# Patient Record
Sex: MALE | Race: WHITE | NOT HISPANIC OR LATINO | Employment: OTHER | URBAN - METROPOLITAN AREA
[De-identification: names, ages, dates, MRNs, and addresses within clinical notes are randomized per-mention and may not be internally consistent; named-entity substitution may affect disease eponyms.]

---

## 2022-08-07 ENCOUNTER — NURSE TRIAGE (OUTPATIENT)
Dept: NURSING | Facility: CLINIC | Age: 67
End: 2022-08-07

## 2022-08-07 NOTE — TELEPHONE ENCOUNTER
Nurse Triage SBAR    Is this a 2nd Level Triage? No    Situation: Patient tested positive for Coronavirus today on home test antigen test. Patient has a runny nose, hoarse voice, no throat discomfort, occasional cough (sometimes productive).     Background: Patient reports his symptoms started Friday, 8/5/22.  Patient states he is vaccinated and boosted.    Assessment:     Monoclonal Antibody Administration    You may be eligible to receive a new treatment with a monoclonal antibody for preventing hospitalization in patients at high risk for complications from COVID-19. This medication is still experimental and available on a limited basis; it is given through an IV and must be given at an infusion center. Please note that not all people who are eligible will receive the medication since it is in limited supply.  Is the patient symptomatic and onset of symptoms within the last 7 days?  Yes  Is the patient interested in a visit with a provider to discuss treatment options?: Yes  Is the patient seen at [facility]?  No: Warm transfer caller to [phone] to be scheduled with a virtual urgent provider.  During transfer, instruct  on appropriate time frame for visit     Review information with Patient    Your result was positive. This means you have COVID-19 (coronavirus).      How can I protect others?    These guidelines are for isolating before returning to work, school or .       If you DO have symptoms:  o Stay home and away from others  - For at least 5 days after your symptoms started, AND   - You are fever free for 24 hours (with no medicine that reduces fever), AND  - Your other symptoms are better.  o Wear a mask for 10 full days any time you are around others.    If you DON'T have symptoms:  o Stay at home and away from others for at least 5 days after your positive test.  o Wear a mask for 10 full days any time you are around others.    There may be different guidelines for  healthcare facilities. Please check with the specific sites before arriving.     If you've been told by a doctor that you were severely ill with COVID-19 or are immunocompromised, you should isolate for at least 10 days.    You should not go back to work until you meet the guidelines above for ending your home isolation. You don't need to be retested for COVID-19 before going back to work--studies show that you won't spread the virus if it's been at least 10 days since your symptoms started (or 20 days, if you have a weak immune system).    Employers, schools, and daycares: This is an official notice for this person's medical guidelines for returning in-person. They must meet the above guidelines before going back to work, school, or  in person.    You will receive a positive COVID-19 letter via [employer] or the mail soon with additional self-care information.      Would you like me to review some of that information with you now?  Yes    How can I take care of myself?      Get lots of rest. Drink extra fluids (unless a doctor has told you not to).      Take Tylenol (acetaminophen) for fever or pain. If you have liver or kidney problems, ask your family doctor if it's okay to take Tylenol.     Take either:     650 mg (two 325 mg pills) every 4 to 6 hours, or     1,000 mg (two 500 mg pills) every 8 hours as needed.     Note: Do not take more than 3,000 mg in one day. Acetaminophen is found in many medicines (both prescribed and over-the-counter medicines). Read all labels to be sure you don't take too much.    For children, check the Tylenol bottle for the right dose (based on their age or weight).      If you have other health problems (like cancer, heart failure, an organ transplant or severe kidney disease): Call your specialty clinic if you don't feel better in the next 2 days.      Know when to call 911: Emergency warning signs include:    Trouble breathing or shortness of breath    Pain or pressure in the  chest that doesn't go away    Feeling confused like you haven't felt before, or not being able to wake up    Bluish-colored lips or face        Recommendation: Per disposition, Call telemedicine provider now. Home care advice provided. Advised patient to call back with any new or worsening symptoms. Patient verbalized understanding and agrees with plan. Patient transferred to scheduling for Covid treatment.     Protocol Recommended Disposition: eVisit Referral/Virtual Visit to discuss Covid treatment    Arleth Conley RN on 8/7/2022 at 3:01 PM    Reason for Disposition    [1] HIGH RISK for severe COVID complications (e.g., weak immune system, age > 64 years, obesity with BMI > 25, pregnant, chronic lung disease or other chronic medical condition) AND [2] COVID symptoms (e.g., cough, fever)  (Exceptions: Already seen by PCP and no new or worsening symptoms.)    Additional Information    Negative: SEVERE difficulty breathing (e.g., struggling for each breath, speaks in single words)    Negative: Difficult to awaken or acting confused (e.g., disoriented, slurred speech)    Negative: Bluish (or gray) lips or face now    Negative: Shock suspected (e.g., cold/pale/clammy skin, too weak to stand, low BP, rapid pulse)    Negative: Sounds like a life-threatening emergency to the triager    Negative: [1] Diagnosed or suspected COVID-19 AND [2] symptoms lasting 3 or more weeks    Negative: [1] COVID-19 exposure AND [2] no symptoms    Negative: COVID-19 vaccine reaction suspected (e.g., fever, headache, muscle aches) occurring 1 to 3 days after getting vaccine    Negative: COVID-19 vaccine, questions about    Negative: [1] Lives with someone known to have influenza (flu test positive) AND [2] flu-like symptoms (e.g., cough, runny nose, sore throat, SOB; with or without fever)    Negative: [1] Adult with possible COVID-19 symptoms AND [2] triager concerned about severity of symptoms or other causes    Negative: COVID-19 and  breastfeeding, questions about    Negative: SEVERE or constant chest pain or pressure  (Exception: Mild central chest pain, present only when coughing.)    Negative: MODERATE difficulty breathing (e.g., speaks in phrases, SOB even at rest, pulse 100-120)    Negative: [1] Headache AND [2] stiff neck (can't touch chin to chest)    Negative: Oxygen level (e.g., pulse oximetry) 90 percent or lower     Not available    Negative: Chest pain or pressure    Negative: Patient sounds very sick or weak to the triager    Negative: MILD difficulty breathing (e.g., minimal/no SOB at rest, SOB with walking, pulse <100)    Negative: Fever > 103 F (39.4 C)    Negative: [1] Fever > 101 F (38.3 C) AND [2] age > 60 years    Negative: [1] Fever > 100.0 F (37.8 C) AND [2] bedridden (e.g., nursing home patient, CVA, chronic illness, recovering from surgery)    Protocols used: CORONAVIRUS (COVID-19) DIAGNOSED OR [id number]

## 2022-08-08 ENCOUNTER — VIRTUAL VISIT (OUTPATIENT)
Dept: URGENT CARE | Facility: CLINIC | Age: 67
End: 2022-08-08
Payer: COMMERCIAL

## 2022-08-08 DIAGNOSIS — U07.1 CLINICAL DIAGNOSIS OF COVID-19: Primary | ICD-10-CM

## 2022-08-08 PROCEDURE — 99203 OFFICE O/P NEW LOW 30 MIN: CPT | Mod: CS

## 2022-08-08 NOTE — PROGRESS NOTES
"Alfredo is a 67 year old who is being evaluated via a billable telephone visit.      COVID + 8/7.  Sx started 8/5.  COVID vaccinated and boosted.  Runny nose, occasional cough, tearing.  COVID vaccinated and boosted x 1  + smoker,  Hx of TIA.  On ASA/atenolol and hydrochlorothiazide per patient.    What phone number would you like to be contacted at? cell  How would you like to obtain your AVS? MyChart    Assessment & Plan     Clinical diagnosis of COVID-19    - nirmatrelvir and ritonavir (PAXLOVID) therapy pack; Take 3 tablets by mouth 2 times daily for 5 days (Take 2 Nirmatrelvir tablets and 1 Ritonavir tablet twice daily for 5 days)      COVID-19 positive patient.  Encounter for consideration of medication intervention. Patient does qualify for a prescription. Full discussion with patient including medication options, risks and benefits. Potential drug interactions reviewed with patient.     Treatment Planned Paxlovid sent to [facility]    Temporary change to home medications:  None     Estimated body mass index is 26.55 kg/m  as calculated from the following:    Height as of 2/8/11: 1.689 m (5' 6.5\").    Weight as of 2/8/11: 75.8 kg (167 lb).  GFR Estimate   Date Value Ref Range Status   02/08/2011 68 >60 mL/min/1.7m2 Final     Patient denies hx of CKD. Last GFR 2021 > 60 [place].    Lupis Mendez MD  Virtual Urgent Care  [facility] VIRTUAL URGENT [facility]ARE    Subjective   Alfredo is a 67 year old, presenting for the following health issues:  No chief complaint on file.      HPI     COVID + 8/7.  Sx started 8/5.  COVID vaccinated and boosted.  Runny nose, occasional cough, tearing.  COVID vaccinated and boosted x 1      Review of Systems   Constitutional, HEENT, cardiovascular, pulmonary, GI, , musculoskeletal, neuro, skin, endocrine and psych systems are negative, except as otherwise noted.      Objective           Vitals:  No vitals were obtained today due to virtual visit.    Physical Exam "   healthy, alert and no distress  PSYCH: Alert and oriented times 3; coherent speech, normal   rate and volume, able to articulate logical thoughts, able   to abstract reason, no tangential thoughts, no hallucinations   or delusions  His affect is normal and pleasant  RESP: No cough, no audible wheezing, able to talk in full sentences  Remainder of exam unable to be completed due to telephone visits        Phone call duration: 10 minutes    .  ..   Protopic Pregnancy And Lactation Text: This medication is Pregnancy Category C. It is unknown if this medication is excreted in breast milk when applied topically.

## 2023-09-24 ENCOUNTER — ANESTHESIA EVENT (OUTPATIENT)
Dept: SURGERY | Facility: CLINIC | Age: 68
DRG: 268 | End: 2023-09-24
Payer: COMMERCIAL

## 2023-09-24 ENCOUNTER — ANESTHESIA (OUTPATIENT)
Dept: SURGERY | Facility: CLINIC | Age: 68
DRG: 268 | End: 2023-09-24
Payer: COMMERCIAL

## 2023-09-24 ENCOUNTER — APPOINTMENT (OUTPATIENT)
Dept: CT IMAGING | Facility: CLINIC | Age: 68
DRG: 268 | End: 2023-09-24
Attending: EMERGENCY MEDICINE
Payer: COMMERCIAL

## 2023-09-24 ENCOUNTER — HOSPITAL ENCOUNTER (INPATIENT)
Facility: CLINIC | Age: 68
Setting detail: SURGERY ADMIT
End: 2023-09-24
Attending: SURGERY | Admitting: SURGERY
Payer: COMMERCIAL

## 2023-09-24 ENCOUNTER — HOSPITAL ENCOUNTER (INPATIENT)
Facility: CLINIC | Age: 68
LOS: 54 days | Discharge: NURSING, REHAB OR LONG-TERM CARE | DRG: 268 | End: 2023-11-17
Attending: EMERGENCY MEDICINE | Admitting: SURGERY
Payer: COMMERCIAL

## 2023-09-24 DIAGNOSIS — I71.43 INFRARENAL ABDOMINAL AORTIC ANEURYSM (AAA) WITHOUT RUPTURE (H): ICD-10-CM

## 2023-09-24 DIAGNOSIS — I71.33: ICD-10-CM

## 2023-09-24 DIAGNOSIS — I70.222 CRITICAL LIMB ISCHEMIA OF LEFT LOWER EXTREMITY (H): Primary | ICD-10-CM

## 2023-09-24 DIAGNOSIS — I10 HYPERTENSION, UNSPECIFIED TYPE: ICD-10-CM

## 2023-09-24 DIAGNOSIS — N17.0 ACUTE KIDNEY FAILURE WITH TUBULAR NECROSIS (H): ICD-10-CM

## 2023-09-24 DIAGNOSIS — L89.616 PRESSURE INJURY OF DEEP TISSUE OF RIGHT HEEL: ICD-10-CM

## 2023-09-24 LAB
ABO/RH(D): NORMAL
ABO/RH(D): NORMAL
ALBUMIN SERPL BCG-MCNC: 4.6 G/DL (ref 3.5–5.2)
ALP SERPL-CCNC: 76 U/L (ref 40–129)
ALT SERPL W P-5'-P-CCNC: 27 U/L (ref 0–70)
ANION GAP SERPL CALCULATED.3IONS-SCNC: 15 MMOL/L (ref 7–15)
ANTIBODY SCREEN: NEGATIVE
AST SERPL W P-5'-P-CCNC: 34 U/L (ref 0–45)
BASOPHILS # BLD AUTO: 0 10E3/UL (ref 0–0.2)
BASOPHILS NFR BLD AUTO: 0 %
BILIRUB DIRECT SERPL-MCNC: <0.2 MG/DL (ref 0–0.3)
BILIRUB SERPL-MCNC: 0.8 MG/DL
BLD PROD TYP BPU: NORMAL
BLOOD COMPONENT TYPE: NORMAL
BUN SERPL-MCNC: 14.4 MG/DL (ref 8–23)
CALCIUM SERPL-MCNC: 9.4 MG/DL (ref 8.8–10.2)
CHLORIDE SERPL-SCNC: 98 MMOL/L (ref 98–107)
CODING SYSTEM: NORMAL
CREAT BLD-MCNC: 1.1 MG/DL (ref 0.7–1.3)
CREAT SERPL-MCNC: 1 MG/DL (ref 0.67–1.17)
CROSSMATCH: NORMAL
DEPRECATED HCO3 PLAS-SCNC: 21 MMOL/L (ref 22–29)
EGFRCR SERPLBLD CKD-EPI 2021: 81 ML/MIN/1.73M2
EGFRCR SERPLBLD CKD-EPI 2021: >60 ML/MIN/1.73M2
EOSINOPHIL # BLD AUTO: 0 10E3/UL (ref 0–0.7)
EOSINOPHIL NFR BLD AUTO: 0 %
ERYTHROCYTE [DISTWIDTH] IN BLOOD BY AUTOMATED COUNT: 12.4 % (ref 10–15)
GLUCOSE SERPL-MCNC: 152 MG/DL (ref 70–99)
HCT VFR BLD AUTO: 45.1 % (ref 40–53)
HGB BLD-MCNC: 15.6 G/DL (ref 13.3–17.7)
HOLD SPECIMEN: NORMAL
HOLD SPECIMEN: NORMAL
IMM GRANULOCYTES # BLD: 0.1 10E3/UL
IMM GRANULOCYTES NFR BLD: 1 %
ISSUE DATE AND TIME: NORMAL
LIPASE SERPL-CCNC: 21 U/L (ref 13–60)
LYMPHOCYTES # BLD AUTO: 0.5 10E3/UL (ref 0.8–5.3)
LYMPHOCYTES NFR BLD AUTO: 6 %
MCH RBC QN AUTO: 36.5 PG (ref 26.5–33)
MCHC RBC AUTO-ENTMCNC: 34.6 G/DL (ref 31.5–36.5)
MCV RBC AUTO: 106 FL (ref 78–100)
MONOCYTES # BLD AUTO: 0.5 10E3/UL (ref 0–1.3)
MONOCYTES NFR BLD AUTO: 6 %
NEUTROPHILS # BLD AUTO: 7.4 10E3/UL (ref 1.6–8.3)
NEUTROPHILS NFR BLD AUTO: 87 %
NRBC # BLD AUTO: 0 10E3/UL
NRBC BLD AUTO-RTO: 0 /100
PLATELET # BLD AUTO: 96 10E3/UL (ref 150–450)
POTASSIUM SERPL-SCNC: 4.6 MMOL/L (ref 3.4–5.3)
PROT SERPL-MCNC: 7.8 G/DL (ref 6.4–8.3)
RADIOLOGIST FLAGS: ABNORMAL
RBC # BLD AUTO: 4.27 10E6/UL (ref 4.4–5.9)
SODIUM SERPL-SCNC: 134 MMOL/L (ref 136–145)
SPECIMEN EXPIRATION DATE: NORMAL
SPECIMEN EXPIRATION DATE: NORMAL
UNIT ABO/RH: NORMAL
UNIT NUMBER: NORMAL
UNIT STATUS: NORMAL
UNIT TYPE ISBT: 2800
UNIT TYPE ISBT: 5100
UNIT TYPE ISBT: 6200
UNIT TYPE ISBT: 8400
UNIT TYPE ISBT: 9500
WBC # BLD AUTO: 8.5 10E3/UL (ref 4–11)

## 2023-09-24 PROCEDURE — 93005 ELECTROCARDIOGRAM TRACING: CPT

## 2023-09-24 PROCEDURE — 36415 COLL VENOUS BLD VENIPUNCTURE: CPT | Performed by: EMERGENCY MEDICINE

## 2023-09-24 PROCEDURE — 96375 TX/PRO/DX INJ NEW DRUG ADDON: CPT

## 2023-09-24 PROCEDURE — 86923 COMPATIBILITY TEST ELECTRIC: CPT

## 2023-09-24 PROCEDURE — 99292 CRITICAL CARE ADDL 30 MIN: CPT | Mod: 25 | Performed by: STUDENT IN AN ORGANIZED HEALTH CARE EDUCATION/TRAINING PROGRAM

## 2023-09-24 PROCEDURE — C1781 MESH (IMPLANTABLE): HCPCS | Performed by: SURGERY

## 2023-09-24 PROCEDURE — C1894 INTRO/SHEATH, NON-LASER: HCPCS | Performed by: SURGERY

## 2023-09-24 PROCEDURE — 85025 COMPLETE CBC W/AUTO DIFF WBC: CPT | Performed by: EMERGENCY MEDICINE

## 2023-09-24 PROCEDURE — C1725 CATH, TRANSLUMIN NON-LASER: HCPCS | Performed by: SURGERY

## 2023-09-24 PROCEDURE — 84295 ASSAY OF SERUM SODIUM: CPT

## 2023-09-24 PROCEDURE — 85347 COAGULATION TIME ACTIVATED: CPT

## 2023-09-24 PROCEDURE — 35103 REPAIR ARTERY RUPTURE AORTA: CPT | Mod: 80 | Performed by: SURGERY

## 2023-09-24 PROCEDURE — 82330 ASSAY OF CALCIUM: CPT

## 2023-09-24 PROCEDURE — 93010 ELECTROCARDIOGRAM REPORT: CPT | Performed by: EMERGENCY MEDICINE

## 2023-09-24 PROCEDURE — 250N000009 HC RX 250: Performed by: STUDENT IN AN ORGANIZED HEALTH CARE EDUCATION/TRAINING PROGRAM

## 2023-09-24 PROCEDURE — P9016 RBC LEUKOCYTES REDUCED: HCPCS

## 2023-09-24 PROCEDURE — 250N000009 HC RX 250: Performed by: EMERGENCY MEDICINE

## 2023-09-24 PROCEDURE — 99291 CRITICAL CARE FIRST HOUR: CPT | Mod: 25 | Performed by: EMERGENCY MEDICINE

## 2023-09-24 PROCEDURE — P9059 PLASMA, FRZ BETWEEN 8-24HOUR: HCPCS

## 2023-09-24 PROCEDURE — 83690 ASSAY OF LIPASE: CPT | Performed by: EMERGENCY MEDICINE

## 2023-09-24 PROCEDURE — 250N000024 HC ISOFLURANE, PER MIN: Performed by: SURGERY

## 2023-09-24 PROCEDURE — 250N000011 HC RX IP 250 OP 636: Performed by: STUDENT IN AN ORGANIZED HEALTH CARE EDUCATION/TRAINING PROGRAM

## 2023-09-24 PROCEDURE — C1887 CATHETER, GUIDING: HCPCS | Performed by: SURGERY

## 2023-09-24 PROCEDURE — 85396 CLOTTING ASSAY WHOLE BLOOD: CPT

## 2023-09-24 PROCEDURE — 99292 CRITICAL CARE ADDL 30 MIN: CPT | Mod: 25

## 2023-09-24 PROCEDURE — C1768 GRAFT, VASCULAR: HCPCS | Performed by: SURGERY

## 2023-09-24 PROCEDURE — 96374 THER/PROPH/DIAG INJ IV PUSH: CPT

## 2023-09-24 PROCEDURE — P9037 PLATE PHERES LEUKOREDU IRRAD: HCPCS

## 2023-09-24 PROCEDURE — 258N000003 HC RX IP 258 OP 636: Performed by: STUDENT IN AN ORGANIZED HEALTH CARE EDUCATION/TRAINING PROGRAM

## 2023-09-24 PROCEDURE — 200N000002 HC R&B ICU UMMC

## 2023-09-24 PROCEDURE — 75635 CT ANGIO ABDOMINAL ARTERIES: CPT

## 2023-09-24 PROCEDURE — 04R00JZ REPLACEMENT OF ABDOMINAL AORTA WITH SYNTHETIC SUBSTITUTE, OPEN APPROACH: ICD-10-PCS | Performed by: SURGERY

## 2023-09-24 PROCEDURE — 96376 TX/PRO/DX INJ SAME DRUG ADON: CPT

## 2023-09-24 PROCEDURE — 99292 CRITICAL CARE ADDL 30 MIN: CPT | Mod: 25 | Performed by: EMERGENCY MEDICINE

## 2023-09-24 PROCEDURE — 04L03DJ OCCLUSION OF ABDOMINAL AORTA WITH INTRALUMINAL DEVICE, TEMPORARY, PERCUTANEOUS APPROACH: ICD-10-PCS | Performed by: SURGERY

## 2023-09-24 PROCEDURE — 250N000011 HC RX IP 250 OP 636: Mod: JZ | Performed by: STUDENT IN AN ORGANIZED HEALTH CARE EDUCATION/TRAINING PROGRAM

## 2023-09-24 PROCEDURE — 82565 ASSAY OF CREATININE: CPT

## 2023-09-24 PROCEDURE — 370N000017 HC ANESTHESIA TECHNICAL FEE, PER MIN: Performed by: SURGERY

## 2023-09-24 PROCEDURE — 86923 COMPATIBILITY TEST ELECTRIC: CPT | Performed by: PHARMACIST

## 2023-09-24 PROCEDURE — 272N000001 HC OR GENERAL SUPPLY STERILE: Performed by: SURGERY

## 2023-09-24 PROCEDURE — 250N000011 HC RX IP 250 OP 636: Mod: JZ | Performed by: EMERGENCY MEDICINE

## 2023-09-24 PROCEDURE — 360N000077 HC SURGERY LEVEL 4, PER MIN: Performed by: SURGERY

## 2023-09-24 PROCEDURE — 86900 BLOOD TYPING SEROLOGIC ABO: CPT | Performed by: STUDENT IN AN ORGANIZED HEALTH CARE EDUCATION/TRAINING PROGRAM

## 2023-09-24 PROCEDURE — 80048 BASIC METABOLIC PNL TOTAL CA: CPT | Performed by: EMERGENCY MEDICINE

## 2023-09-24 PROCEDURE — 36415 COLL VENOUS BLD VENIPUNCTURE: CPT | Performed by: STUDENT IN AN ORGANIZED HEALTH CARE EDUCATION/TRAINING PROGRAM

## 2023-09-24 PROCEDURE — C1760 CLOSURE DEV, VASC: HCPCS | Performed by: SURGERY

## 2023-09-24 PROCEDURE — 82248 BILIRUBIN DIRECT: CPT | Performed by: EMERGENCY MEDICINE

## 2023-09-24 PROCEDURE — 99291 CRITICAL CARE FIRST HOUR: CPT | Mod: 25

## 2023-09-24 PROCEDURE — 04RC0JZ REPLACEMENT OF RIGHT COMMON ILIAC ARTERY WITH SYNTHETIC SUBSTITUTE, OPEN APPROACH: ICD-10-PCS | Performed by: SURGERY

## 2023-09-24 PROCEDURE — C1769 GUIDE WIRE: HCPCS | Performed by: SURGERY

## 2023-09-24 RX ORDER — CEFAZOLIN SODIUM 2 G/100ML
2 INJECTION, SOLUTION INTRAVENOUS
Status: CANCELLED | OUTPATIENT
Start: 2023-09-24

## 2023-09-24 RX ORDER — VASOPRESSIN IN 0.9 % NACL 2 UNIT/2ML
SYRINGE (ML) INTRAVENOUS PRN
Status: DISCONTINUED | OUTPATIENT
Start: 2023-09-24 | End: 2023-09-25

## 2023-09-24 RX ORDER — LABETALOL HYDROCHLORIDE 5 MG/ML
10 INJECTION, SOLUTION INTRAVENOUS ONCE
Status: COMPLETED | OUTPATIENT
Start: 2023-09-24 | End: 2023-09-24

## 2023-09-24 RX ORDER — CEFAZOLIN SODIUM 1 G/3ML
INJECTION, POWDER, FOR SOLUTION INTRAMUSCULAR; INTRAVENOUS PRN
Status: DISCONTINUED | OUTPATIENT
Start: 2023-09-24 | End: 2023-09-25

## 2023-09-24 RX ORDER — FENTANYL CITRATE 50 UG/ML
INJECTION, SOLUTION INTRAMUSCULAR; INTRAVENOUS PRN
Status: DISCONTINUED | OUTPATIENT
Start: 2023-09-24 | End: 2023-09-25

## 2023-09-24 RX ORDER — AMLODIPINE AND BENAZEPRIL HYDROCHLORIDE 5; 20 MG/1; MG/1
1 CAPSULE ORAL DAILY
Status: ON HOLD | COMMUNITY
End: 2023-11-17

## 2023-09-24 RX ORDER — HYDROMORPHONE HYDROCHLORIDE 1 MG/ML
0.3 INJECTION, SOLUTION INTRAMUSCULAR; INTRAVENOUS; SUBCUTANEOUS ONCE
Status: COMPLETED | OUTPATIENT
Start: 2023-09-24 | End: 2023-09-24

## 2023-09-24 RX ORDER — IOPAMIDOL 755 MG/ML
100 INJECTION, SOLUTION INTRAVASCULAR ONCE
Status: COMPLETED | OUTPATIENT
Start: 2023-09-24 | End: 2023-09-24

## 2023-09-24 RX ORDER — PROPOFOL 10 MG/ML
INJECTION, EMULSION INTRAVENOUS PRN
Status: DISCONTINUED | OUTPATIENT
Start: 2023-09-24 | End: 2023-09-25

## 2023-09-24 RX ORDER — ETOMIDATE 2 MG/ML
INJECTION INTRAVENOUS PRN
Status: DISCONTINUED | OUTPATIENT
Start: 2023-09-24 | End: 2023-09-25

## 2023-09-24 RX ORDER — HYDROMORPHONE HYDROCHLORIDE 1 MG/ML
0.5 INJECTION, SOLUTION INTRAMUSCULAR; INTRAVENOUS; SUBCUTANEOUS ONCE
Status: COMPLETED | OUTPATIENT
Start: 2023-09-24 | End: 2023-09-24

## 2023-09-24 RX ORDER — ESMOLOL HYDROCHLORIDE 20 MG/ML
50-300 INJECTION, SOLUTION INTRAVENOUS CONTINUOUS
Status: DISCONTINUED | OUTPATIENT
Start: 2023-09-24 | End: 2023-09-25

## 2023-09-24 RX ORDER — SODIUM CHLORIDE, SODIUM LACTATE, POTASSIUM CHLORIDE, CALCIUM CHLORIDE 600; 310; 30; 20 MG/100ML; MG/100ML; MG/100ML; MG/100ML
INJECTION, SOLUTION INTRAVENOUS CONTINUOUS PRN
Status: DISCONTINUED | OUTPATIENT
Start: 2023-09-24 | End: 2023-09-25

## 2023-09-24 RX ORDER — HYDROMORPHONE HCL IN WATER/PF 6 MG/30 ML
0.4 PATIENT CONTROLLED ANALGESIA SYRINGE INTRAVENOUS ONCE
Status: COMPLETED | OUTPATIENT
Start: 2023-09-24 | End: 2023-09-24

## 2023-09-24 RX ORDER — ONDANSETRON 2 MG/ML
4 INJECTION INTRAMUSCULAR; INTRAVENOUS ONCE
Status: COMPLETED | OUTPATIENT
Start: 2023-09-24 | End: 2023-09-24

## 2023-09-24 RX ORDER — HEPARIN SODIUM 1000 [USP'U]/ML
INJECTION, SOLUTION INTRAVENOUS; SUBCUTANEOUS PRN
Status: DISCONTINUED | OUTPATIENT
Start: 2023-09-24 | End: 2023-09-25

## 2023-09-24 RX ORDER — CEFAZOLIN SODIUM 2 G/100ML
2 INJECTION, SOLUTION INTRAVENOUS SEE ADMIN INSTRUCTIONS
Status: CANCELLED | OUTPATIENT
Start: 2023-09-24

## 2023-09-24 RX ORDER — HYDRALAZINE HYDROCHLORIDE 20 MG/ML
10 INJECTION INTRAMUSCULAR; INTRAVENOUS ONCE
Status: COMPLETED | OUTPATIENT
Start: 2023-09-24 | End: 2023-09-24

## 2023-09-24 RX ADMIN — FENTANYL CITRATE 100 MCG: 50 INJECTION, SOLUTION INTRAMUSCULAR; INTRAVENOUS at 23:12

## 2023-09-24 RX ADMIN — LABETALOL HYDROCHLORIDE 10 MG: 5 INJECTION, SOLUTION INTRAVENOUS at 18:57

## 2023-09-24 RX ADMIN — MIDAZOLAM 0.5 MG: 1 INJECTION INTRAMUSCULAR; INTRAVENOUS at 21:46

## 2023-09-24 RX ADMIN — NICARDIPINE HYDROCHLORIDE 5 MG/HR: 0.2 INJECTION, SOLUTION INTRAVENOUS at 19:47

## 2023-09-24 RX ADMIN — Medication 1 UNITS: at 23:35

## 2023-09-24 RX ADMIN — SODIUM CHLORIDE, POTASSIUM CHLORIDE, SODIUM LACTATE AND CALCIUM CHLORIDE: 600; 310; 30; 20 INJECTION, SOLUTION INTRAVENOUS at 21:24

## 2023-09-24 RX ADMIN — CEFAZOLIN 2 G: 1 INJECTION, POWDER, FOR SOLUTION INTRAMUSCULAR; INTRAVENOUS at 21:45

## 2023-09-24 RX ADMIN — NOREPINEPHRINE BITARTRATE 6.4 MCG: 1 INJECTION, SOLUTION, CONCENTRATE INTRAVENOUS at 23:42

## 2023-09-24 RX ADMIN — NOREPINEPHRINE BITARTRATE 6.4 MCG: 1 INJECTION, SOLUTION, CONCENTRATE INTRAVENOUS at 23:33

## 2023-09-24 RX ADMIN — NOREPINEPHRINE BITARTRATE 6.4 MCG: 1 INJECTION, SOLUTION, CONCENTRATE INTRAVENOUS at 23:27

## 2023-09-24 RX ADMIN — FENTANYL CITRATE 50 MCG: 50 INJECTION, SOLUTION INTRAMUSCULAR; INTRAVENOUS at 21:50

## 2023-09-24 RX ADMIN — NOREPINEPHRINE BITARTRATE 0.02 MCG/KG/MIN: 1 INJECTION, SOLUTION, CONCENTRATE INTRAVENOUS at 22:52

## 2023-09-24 RX ADMIN — Medication 1 UNITS: at 23:37

## 2023-09-24 RX ADMIN — HYDROMORPHONE HYDROCHLORIDE 0.4 MG: 0.2 INJECTION, SOLUTION INTRAMUSCULAR; INTRAVENOUS; SUBCUTANEOUS at 16:47

## 2023-09-24 RX ADMIN — ESMOLOL HYDROCHLORIDE IN SODIUM CHLORIDE 50 MCG/KG/MIN: 20 INJECTION INTRAVENOUS at 19:03

## 2023-09-24 RX ADMIN — ONDANSETRON 4 MG: 2 INJECTION INTRAMUSCULAR; INTRAVENOUS at 17:31

## 2023-09-24 RX ADMIN — MIDAZOLAM 0.5 MG: 1 INJECTION INTRAMUSCULAR; INTRAVENOUS at 21:34

## 2023-09-24 RX ADMIN — Medication 100 MG: at 22:31

## 2023-09-24 RX ADMIN — HYDROMORPHONE HYDROCHLORIDE 0.5 MG: 1 INJECTION, SOLUTION INTRAMUSCULAR; INTRAVENOUS; SUBCUTANEOUS at 20:19

## 2023-09-24 RX ADMIN — HYDROMORPHONE HYDROCHLORIDE 0.5 MG: 1 INJECTION, SOLUTION INTRAMUSCULAR; INTRAVENOUS; SUBCUTANEOUS at 21:15

## 2023-09-24 RX ADMIN — IOPAMIDOL 100 ML: 755 INJECTION, SOLUTION INTRAVENOUS at 18:13

## 2023-09-24 RX ADMIN — HYDRALAZINE HYDROCHLORIDE 10 MG: 20 INJECTION INTRAMUSCULAR; INTRAVENOUS at 16:50

## 2023-09-24 RX ADMIN — HEPARIN SODIUM 5000 UNITS: 1000 INJECTION INTRAVENOUS; SUBCUTANEOUS at 23:59

## 2023-09-24 RX ADMIN — Medication 1 UNITS: at 23:32

## 2023-09-24 RX ADMIN — MIDAZOLAM 0.5 MG: 1 INJECTION INTRAMUSCULAR; INTRAVENOUS at 21:57

## 2023-09-24 RX ADMIN — ETOMIDATE 12 MG: 40 INJECTION, SOLUTION INTRAVENOUS at 22:31

## 2023-09-24 RX ADMIN — NOREPINEPHRINE BITARTRATE 6.4 MCG: 1 INJECTION, SOLUTION, CONCENTRATE INTRAVENOUS at 23:30

## 2023-09-24 RX ADMIN — VASOPRESSIN 1 UNITS/HR: 20 INJECTION, SOLUTION INTRAMUSCULAR; SUBCUTANEOUS at 23:32

## 2023-09-24 RX ADMIN — NOREPINEPHRINE BITARTRATE 6.4 MCG: 1 INJECTION, SOLUTION, CONCENTRATE INTRAVENOUS at 23:35

## 2023-09-24 RX ADMIN — SODIUM CHLORIDE 80 ML: 9 INJECTION, SOLUTION INTRAVENOUS at 18:14

## 2023-09-24 RX ADMIN — Medication 1 UNITS: at 23:30

## 2023-09-24 RX ADMIN — SODIUM CHLORIDE, SODIUM LACTATE, POTASSIUM CHLORIDE, CALCIUM CHLORIDE: 600; 310; 30; 20 INJECTION, SOLUTION INTRAVENOUS at 21:32

## 2023-09-24 RX ADMIN — PROPOFOL 30 MG: 10 INJECTION, EMULSION INTRAVENOUS at 22:31

## 2023-09-24 RX ADMIN — HEPARIN SODIUM 3000 UNITS: 1000 INJECTION INTRAVENOUS; SUBCUTANEOUS at 23:26

## 2023-09-24 RX ADMIN — HYDROMORPHONE HYDROCHLORIDE 0.4 MG: 0.2 INJECTION, SOLUTION INTRAMUSCULAR; INTRAVENOUS; SUBCUTANEOUS at 17:28

## 2023-09-24 RX ADMIN — NOREPINEPHRINE BITARTRATE 6.4 MCG: 1 INJECTION, SOLUTION, CONCENTRATE INTRAVENOUS at 23:38

## 2023-09-24 RX ADMIN — NOREPINEPHRINE BITARTRATE 12.8 MCG: 1 INJECTION, SOLUTION, CONCENTRATE INTRAVENOUS at 23:46

## 2023-09-24 RX ADMIN — HYDROMORPHONE HYDROCHLORIDE 0.3 MG: 1 INJECTION, SOLUTION INTRAMUSCULAR; INTRAVENOUS; SUBCUTANEOUS at 18:41

## 2023-09-24 ASSESSMENT — ACTIVITIES OF DAILY LIVING (ADL)
ADLS_ACUITY_SCORE: 35

## 2023-09-24 ASSESSMENT — LIFESTYLE VARIABLES: TOBACCO_USE: 1

## 2023-09-24 NOTE — ED PROVIDER NOTES
ED Provider Note  [facility]      History     Chief Complaint   Patient presents with    Pelvic Pain     Right side radiates in back     HPI    Alfredo Burnham is a 68 year old male with a past medical history of TIA, hypertension, and blindness who presents to the Emergency Department seeking evaluation of severe right sided abdominal pain that radiates to his flank and lower back as well as down his right upper leg. Laying down generally exacerbates the pain. The pain started yesterday and has become more intense throughout the day today. He notes experiencing nausea as well as 2 episodes of emesis earlier today as well. He denies any dysuria, hematuria, or any urinary symptoms. He further denies any chest pain, shortness of breath, diarrhea. He also denies any history of abdominal surgeries or kidney stones. He also adds that he has been experiencing an intermittent subjective fever. He reports being compliant with his blood pressure medication (amlodipine), but states that he did not take his dose today on account of the nausea. He most recent bowel movement was earlier today.     Past Medical History  Past Medical History:   Diagnosis Date    Hypertension 2/8/2011    Macular degeneration      No past surgical history on file.  amLODIPine-benazepril (LOTREL) 5-20 MG capsule  hydrochlorothiazide (HYDRODIURIL) 25 MG tablet  varenicline (CHANTIX HERBERTH) 0.5 MG X 11 & 1 MG X 42 tablet  ATENOLOL PO      No Known Allergies  Family History  Family History   Problem Relation Age of Onset    Unknown/Adopted Mother     Heart Disease Mother     Arthritis Mother     Hypertension Brother     Blood Disease Sister     Psychotic Disorder Sister      Social History   Social History     Tobacco Use    Smoking status: Every Day     Packs/day: 0.50     Types: Cigarettes   Substance Use Topics    Alcohol use: Yes     Comment: 1-2/wk    Drug use: No      Past medical history, past surgical history,  "medications, allergies, family history, and social history were reviewed with the patient. No additional pertinent items.      A medically appropriate review of systems was performed with pertinent positives and negatives noted in the HPI, and all other systems negative.    Physical Exam   BP: (!) 178/129  Pulse: 117  Temp: 97.8  F (36.6  C)  Resp: 18  Height: 172.7 cm (5' 8\")  Weight: 71.7 kg (158 lb)  SpO2: 100 %  Physical Exam  Vitals and nursing note reviewed.   Constitutional:       General: He is in acute distress.      Appearance: Normal appearance. He is not toxic-appearing.      Comments: Adult male, sitting up in chair, appears quite distressed.   HENT:      Head: Atraumatic.      Mouth/Throat:      Mouth: Mucous membranes are moist.      Pharynx: Oropharynx is clear. No oropharyngeal exudate.   Eyes:      General: No scleral icterus.     Conjunctiva/sclera: Conjunctivae normal.   Cardiovascular:      Rate and Rhythm: Tachycardia present.      Pulses: Normal pulses.      Heart sounds: Normal heart sounds. No murmur heard.  Pulmonary:      Effort: Pulmonary effort is normal. No respiratory distress.      Breath sounds: Normal breath sounds.   Abdominal:      Palpations: Abdomen is soft.      Tenderness: There is abdominal tenderness.      Comments: Abdomen is soft, tender to palpation in right lower quadrant/right pelvis/groin region without guarding or rebound.  No left-sided abdominal tenderness to palpation, no upper abdominal tenderness to palpation.  Active bowel sounds.    No CVA tenderness to percussion.   Musculoskeletal:         General: No deformity.      Comments: Intact DP pulses bilaterally, sensation is intact bilaterally.  Plantarflexion dorsiflexion is intact bilaterally.   Skin:     General: Skin is warm.   Neurological:      Mental Status: He is alert.           ED Course, Procedures, & Data      Procedures            EKG Interpretation:      Interpreted by Manuela De La Rosa MD  Time " reviewed:110   Symptoms at time of EKG: None   Rhythm: Sinus tachycardia  Rate: 100-110  Axis: Normal  Ectopy: Premature ventricular contractions (unifocal)  Conduction: Normal  ST Segments/ T Waves: No ST-T wave changes and No acute ischemic changes  Q Waves: None  Comparison to prior: No old EKG available    Clinical Impression: sinus tachycardia              Results for orders placed or performed during the hospital encounter of 09/24/23   CTA  ABDOMEN PELVIS BILAT LEG RUNOFF W CONTRAST     Status: Abnormal   Result Value Ref Range    Radiologist flags Active arterial bleeding or acute aortic pathology (AA)     Narrative    EXAM: CTA ABDOMEN PELVIS BILAT LEG RUNOFF W CONTR  LOCATION: [facility]  DATE: 9/24/2023    INDICATION: severe R sided abdominal pelvic pain radiating into leg, severe htn, please eval for aortic pathology or other acute intraabdominal process  COMPARISON: None.  TECHNIQUE: Helical acquisition through the abdomen, pelvis, and bilateral lower extremities was performed during the arterial phase of contrast enhancement using IV Contrast. 2D and 3D reconstructions were performed by the CT technologist. Dose reduction   techniques were used.   CONTRAST: 100mL isovue 370    FINDINGS:  AORTA: Moderate mixed attenuation atheroma in the descending thoracic aorta. There is a large infrarenal abdominal aortic aneurysm. The aneurysm sac contains near circumferential thrombus with patent central channel. Along the right posterolateral   aneurysm wall there is a focal outpouching which is high attenuation on the precontrast series associated with surrounding inflammatory stranding consistent with contained rupture (series 6, image 26). At this level in the axial plane the aortic aneurysm   measures 6.4 x 8.4 cm (series 9, image 467). Edema/blood products extends around the IVC, renal veins and right renal artery. No active contrast extravasation. The aneurysm is  contiguous with the left common iliac artery which measures up to 3.1 cm in   diameter proximally. There is also ectasia of the right common iliac artery. Moderate patchy diffuse patchy mixed attenuation atheroma in the external and internal iliac arteries. The proximal right internal iliac artery is ectatic measuring up to 1.6   cm.    RIGHT LEG: Moderate mixed attenuation atheroma the right common femoral artery without critical stenosis. The superficial femoral artery has patchy atheromatous plaque but no narrowing. The deep femoral artery is widely patent. The right popliteal artery   has moderate plaque but no high-grade stenosis. Patchy atheromatous calcifications of the tibioperoneal trunk and proximal trifurcation vessels however there is two-vessel runoff to the right ankle.    There are superficial varicosities in the left calf particularly medially which drain to a piece symmetrically dilated greater saphenous vein.    LEFT LEG: The left common femoral artery is widely patent with mild circumferential wall calcification. The deep femoral artery is ectatic proximally measuring up to 1 cm. The superficial femoral artery has patchy moderate atheromatous plaque but no   high-grade stenosis. The left popliteal artery is patent. Mild atheromatous calcifications of the tibioperoneal trunk. 3 vessel runoff to the left ankle.    LUNG BASES: No actionable nodules, lung edema or airspace opacities. No pleural fluid.    ABDOMEN: Hepatic steatosis. Normal gallbladder and bile ducts. Pancreas, adrenal glands, and spleen are normal. Kidneys are normal in size. Benign cortical renal cyst arising from the right kidney does not require specific workup or follow-up. No   hydronephrosis or nephrolithiasis. No bowel wall thickening or inflammation. Sigmoid diverticulosis. No intraperitoneal free air or free fluid.    PELVIS: Prostate gland is heterogeneous but not enlarged. Seminal vesicles are symmetric. No pelvic free  fluid.      Impression    IMPRESSION:    1.  Infrarenal abdominal aortic aneurysm contiguous with the left common iliac artery measuring up to 6.4 x 8.4 cm. There is a focal ectasia in the right posterolateral wall with edema/blood products around the aneurysm in the retroperitoneum consistent   with contained rupture. Vascular surgery consult is suggested.  2.  Moderate patchy atherosclerosis of the external iliac arteries and arteries of both lower extremities but no high-grade stenosis. 2 vessel runoff to the right ankle and 3 vessel runoff to the left ankle.  3.  Hepatic steatosis.  4.  Diverticulosis but no diverticulitis.      [Critical Result: Active arterial bleeding or acute aortic pathology]    Finding was identified on 9/24/2023 6:32 PM CDT.     Dr. De La Rosa was contacted by me on 9/24/2023 6:30 PM CDT and verbalized understanding of the critical result.    Basic metabolic panel     Status: Abnormal   Result Value Ref Range    Sodium 134 (L) 136 - 145 mmol/L    Potassium 4.6 3.4 - 5.3 mmol/L    Chloride 98 98 - 107 mmol/L    Carbon Dioxide (CO2) 21 (L) 22 - 29 mmol/L    Anion Gap 15 7 - 15 mmol/L    Urea Nitrogen 14.4 8.0 - 23.0 mg/dL    Creatinine 1.00 0.67 - 1.17 mg/dL    Calcium 9.4 8.8 - 10.2 mg/dL    Glucose 152 (H) 70 - 99 mg/dL    GFR Estimate 81 >60 mL/min/1.73m2   Lipase     Status: Normal   Result Value Ref Range    Lipase 21 13 - 60 U/L   Hepatic function panel     Status: Normal   Result Value Ref Range    Protein Total 7.8 6.4 - 8.3 g/dL    Albumin 4.6 3.5 - 5.2 g/dL    Bilirubin Total 0.8 <=1.2 mg/dL    Alkaline Phosphatase 76 40 - 129 U/L    AST 34 0 - 45 U/L    ALT 27 0 - 70 U/L    Bilirubin Direct <0.20 0.00 - 0.30 mg/dL   CBC with platelets and differential     Status: Abnormal   Result Value Ref Range    WBC Count 8.5 4.0 - 11.0 10e3/uL    RBC Count 4.27 (L) 4.40 - 5.90 10e6/uL    Hemoglobin 15.6 13.3 - 17.7 g/dL    Hematocrit 45.1 40.0 - 53.0 %     (H) 78 - 100 fL    MCH 36.5 (H)  26.5 - 33.0 pg    MCHC 34.6 31.5 - 36.5 g/dL    RDW 12.4 10.0 - 15.0 %    Platelet Count 96 (L) 150 - 450 10e3/uL    % Neutrophils 87 %    % Lymphocytes 6 %    % Monocytes 6 %    % Eosinophils 0 %    % Basophils 0 %    % Immature Granulocytes 1 %    NRBCs per 100 WBC 0 <1 /100    Absolute Neutrophils 7.4 1.6 - 8.3 10e3/uL    Absolute Lymphocytes 0.5 (L) 0.8 - 5.3 10e3/uL    Absolute Monocytes 0.5 0.0 - 1.3 10e3/uL    Absolute Eosinophils 0.0 0.0 - 0.7 10e3/uL    Absolute Basophils 0.0 0.0 - 0.2 10e3/uL    Absolute Immature Granulocytes 0.1 <=0.4 10e3/uL    Absolute NRBCs 0.0 10e3/uL   Creatinine POCT     Status: Normal   Result Value Ref Range    Creatinine POCT 1.1 0.7 - 1.3 mg/dL    GFR, ESTIMATED POCT >60 >60 mL/min/1.73m2   [place] Draw     Status: None (In process)    Narrative    The following orders were created for panel order [place] Draw.  Procedure                               Abnormality         Status                     ---------                               -----------         ------                     Extra Blue Top Tube[[id number]]                              In process                 Extra Red Top Tube[[id number]]                               In process                   Please view results for these tests on the individual orders.   EKG 12 lead     Status: None (Preliminary result)   Result Value Ref Range    Systolic Blood Pressure  mmHg    Diastolic Blood Pressure  mmHg    Ventricular Rate 110 BPM    Atrial Rate 110 BPM    WA Interval 150 ms    QRS Duration 72 ms     ms    QTc 468 ms    P Axis 58 degrees    R AXIS -5 degrees    T Axis 37 degrees    Interpretation ECG       Sinus tachycardia with occasional Premature ventricular complexes  Otherwise normal ECG     CBC with Platelets & Differential     Status: Abnormal    Narrative    The following orders were created for panel order CBC with Platelets & Differential.  Procedure                               Abnormality         Status                      ---------                               -----------         ------                     CBC with platelets and d...[[id number]]  Abnormal            Final result                 Please view results for these tests on the individual orders.     Medications   esmolol 20 mg/mL (BREVIBLOC) infusion 100 mL (100 mcg/kg/min × 71.7 kg Intravenous Rate/Dose Change 9/24/23 1916)   hydrALAZINE (APRESOLINE) injection 10 mg (10 mg Intravenous $Given 9/24/23 1650)   ondansetron (ZOFRAN) injection 4 mg (4 mg Intravenous $Given 9/24/23 1731)   HYDROmorphone (DILAUDID) injection 0.4 mg (0.4 mg Intravenous $Given 9/24/23 1647)   HYDROmorphone (DILAUDID) injection 0.4 mg (0.4 mg Intravenous $Given 9/24/23 1728)   iopamidol (ISOVUE-370) solution 100 mL (100 mLs Intravenous $Given 9/24/23 1813)   sodium chloride 100mL for CT scan flush use (80 mLs Intravenous $Given 9/24/23 1814)   HYDROmorphone (PF) (DILAUDID) injection 0.3 mg (0.3 mg Intravenous $Given 9/24/23 1841)   labetalol (NORMODYNE/TRANDATE) injection 10 mg (10 mg Intravenous $Given 9/24/23 1857)     Labs Ordered and Resulted from Time of ED Arrival to Time of ED Departure   BASIC METABOLIC PANEL - Abnormal       Result Value    Sodium 134 (*)     Potassium 4.6      Chloride 98      Carbon Dioxide (CO2) 21 (*)     Anion Gap 15      Urea Nitrogen 14.4      Creatinine 1.00      Calcium 9.4      Glucose 152 (*)     GFR Estimate 81     CBC WITH PLATELETS AND DIFFERENTIAL - Abnormal    WBC Count 8.5      RBC Count 4.27 (*)     Hemoglobin 15.6      Hematocrit 45.1       (*)     MCH 36.5 (*)     MCHC 34.6      RDW 12.4      Platelet Count 96 (*)     % Neutrophils 87      % Lymphocytes 6      % Monocytes 6      % Eosinophils 0      % Basophils 0      % Immature Granulocytes 1      NRBCs per 100 WBC 0      Absolute Neutrophils 7.4      Absolute Lymphocytes 0.5 (*)     Absolute Monocytes 0.5      Absolute Eosinophils 0.0      Absolute Basophils 0.0      Absolute  Immature Granulocytes 0.1      Absolute NRBCs 0.0     LIPASE - Normal    Lipase 21     HEPATIC FUNCTION PANEL - Normal    Protein Total 7.8      Albumin 4.6      Bilirubin Total 0.8      Alkaline Phosphatase 76      AST 34      ALT 27      Bilirubin Direct <0.20     ISTAT CREATININE POCT - Normal    Creatinine POCT 1.1      GFR, ESTIMATED POCT >60     ROUTINE UA WITH MICROSCOPIC REFLEX TO CULTURE   ISTAT CREATININE POCT   ABO/RH TYPE AND SCREEN     CTA  ABDOMEN PELVIS BILAT LEG RUNOFF W CONTRAST   Final Result   Abnormal   IMPRESSION:      1.  Infrarenal abdominal aortic aneurysm contiguous with the left common iliac artery measuring up to 6.4 x 8.4 cm. There is a focal ectasia in the right posterolateral wall with edema/blood products around the aneurysm in the retroperitoneum consistent    with contained rupture. Vascular surgery consult is suggested.   2.  Moderate patchy atherosclerosis of the external iliac arteries and arteries of both lower extremities but no high-grade stenosis. 2 vessel runoff to the right ankle and 3 vessel runoff to the left ankle.   3.  Hepatic steatosis.   4.  Diverticulosis but no diverticulitis.         [Critical Result: Active arterial bleeding or acute aortic pathology]      Finding was identified on 9/24/2023 6:32 PM CDT.       Dr. De La Rosa was contacted by me on 9/24/2023 6:30 PM CDT and verbalized understanding of the critical result.              Critical care was performed.   Critical Care Addendum  My initial assessment, based on my review of vital signs, focused history, and physical exam, established a high suspicion that Alfredo Burnham has ruptured abdominal aortic aneurysm, which requires immediate intervention, and therefore he is critically ill.     After the initial assessment, the care team initiated multiple lab tests, initiated medication therapy with hydralazine, labetalol, esmolol, and consulted with vascular surgery  to provide stabilization care. Due to the  critical nature of this patient, I reassessed nursing observations, vital signs, and physical exam multiple times prior to his disposition.     Time also spent performing documentation, reviewing test results, discussion with consultants, and coordination of care.     Critical care time (excluding teaching time and procedures): 75 minutes.     Assessment & Plan    Patient presents to the emergency department today for the above complaints.  Differential diagnosis of right sided abdominal/pelvic pain is quite extensive, but given his severe hypertension, I do have some concern for aortic pathology including aortic dissection.  Alternatively, kidney stone would be possible, also need to consider other causes of right-sided abdominal pain including appendicitis, diverticulitis, intra-abdominal abscess, mass, amongst others.    I did personally recheck the patient's blood pressure which was 191/140.    We did establish IV access and we did draw blood for laboratory analysis.  I-STAT creatinine is normal at 1.1.  CBC demonstrates normal white count of 8.5, normal hemoglobin of 15.6, platelets of 96,000, no recent comparison value, BMP shows sodium of 134, bicarb of 21, glucose 152, creatinine 1.0, all other values within normal limits, lipase WNL, hepatic panel within normal limits, UA was ordered but never provided.  EKG shows sinus tachycardia with occasional PVCs with a heart rate of 110, no evidence of ischemia.  Cardiac monitoring was initiated. I have ordered both a noncontrast abdominal CT scan, specifically looking for renal stone as well as a contrasted abdomen/pelvis CT with runoff specifically looking at the aorta.    Patient was given Zofran, Dilaudid, and hydralazine here in the emergency department due to severe hypertension.    Current blood pressure is 131/89 after hydralazine.    I did review the CT images myself and I am highly concerned about the potential for significant aortic aneurysm and possible  rupture.  I subsequently called the [place] radiology staff who reviewed the images.  He reports that there does appear to be a large infrarenal aortic aneurysm, with surrounding stranding which is concerning for impending rupture but there is no evidence of extravasation of contrast at this time.  I also subsequently received a call from the [facility] radiology resident about this study as apparently they read the CT scans from [place] at this time of night.  Formal radiology read shows infrarenal abdominal aortic aneurysm contiguous with left common iliac artery measuring 6.4 x 8.4 cm, with focal ectasia in the right posterior lateral wall with edema/blood products around the aneurysm in the retroperitoneum consistent with contained rupture.  I subsequently spoke to Dr. Lowe, vascular surgery staff who did review the images.  I have ordered esmolol for the patient in order to keep a goal systolic blood pressure less than 120 and goal heart rate less than 80.  Patient has received several doses of Dilaudid here in the emergency department for pain.  I have also spoken to the vascular surgery fellow and given them the information on this patient.  I have notified patient placement that this patient requires an ICU bed as quickly as possible.  In the meantime however, we will send him to the [place] ED so that the vascular surgery team can evaluate the patient and start the conversation about potential operative intervention.  I did explain to the patient and family that this is a very critical condition and without treatment/surgery, if the aneurysm ruptures, death would certainly follow.  He is agreeable to transfer to the [place] ED to talk to the vascular surgery team about surgery at this time.  I have spoken to Dr. Cavazos in the [place] ED about this patient.     I have reviewed the nursing notes. I have reviewed the findings, diagnosis, plan and need for follow up with the  patient.    New Prescriptions    No medications on file       Final diagnoses:   Infrarenal abdominal aortic aneurysm (AAA) without rupture (H)   Hypertension, unspecified type   I, Anthony Estrada, am serving as a trained medical scribe to document services personally performed by Manuela De La Rosa MD, based on the provider's statements to me.     I, Manuela De La Rosa MD, was physically present and have reviewed and verified the accuracy of this note documented by Anthony Estrada.      Manuela De La Rosa MD  [facility] EMERGENCY DEPARTMENT  9/24/2023     Manuela De La Rosa MD  09/25/23 0028

## 2023-09-24 NOTE — ED TRIAGE NOTES
Triage Assessment       Row Name 09/24/23 1541       Triage Assessment (Adult)    Airway WDL WDL       Respiratory WDL    Respiratory WDL WDL       Skin Circulation/Temperature WDL    Skin Circulation/Temperature WDL WDL       Cardiac WDL    Cardiac WDL WDL       Peripheral/Neurovascular WDL    Peripheral Neurovascular WDL WDL       Cognitive/Neuro/Behavioral WDL    Cognitive/Neuro/Behavioral WDL WDL

## 2023-09-24 NOTE — LETTER
Transition Communication Hand-off for Care Transitions to Next Level of Care Provider    Name: Alfredo Burnham  : 1955  MRN #: [id number]  [facility]     Primary Clinic: [address] [place] [id number]     Reason for Hospitalization:  Hypertension, unspecified type [I10]  Infrarenal abdominal aortic aneurysm (AAA) without rupture (H24) [I71.43]  Infrarenal abdominal aortic aneurysm, ruptured (H) [I71.33]  Admit Date/Time: 2023  3:44 PM  Discharge Date: 2023  Payor Source: Payor: [facility] / Plan: [employer] MEDICARE / Product Type: HMO /     Readmission Assessment Measure (SVEN) Risk Score/category: 29%         Reason for Communication Hand-off Referral: Avoidable readmission within 30 days    Discharge Plan: Patient will discharge to [facility]       Concern for non-adherence with plan of care:   Y/N No  Discharge Needs Assessment:  Needs      Flowsheet Row Most Recent Value   Anticipated Changes Related to Illness inability to care for self   Equipment Currently Used at Home none          Follow-up specialty is recommended: No    Follow-up plan:    Future Appointments   Date Time Provider Department Center   2023  9:30 AM Chacha Archer, PT [facility] O   2023 11:00 AM Jacki Syed, SLP [facility] O   2023 11:00 AM Cora Tolbert OT [facility] O   2023  8:00 AM Kay Cobb MD [facility]       Any outstanding tests or procedures:              Key Recommendations:  External handoff     Nate Minesh    AVS/Discharge Summary is the source of truth; this is a helpful guide for improved communication of patient story

## 2023-09-25 ENCOUNTER — APPOINTMENT (OUTPATIENT)
Dept: GENERAL RADIOLOGY | Facility: CLINIC | Age: 68
DRG: 268 | End: 2023-09-25
Attending: STUDENT IN AN ORGANIZED HEALTH CARE EDUCATION/TRAINING PROGRAM
Payer: COMMERCIAL

## 2023-09-25 ENCOUNTER — APPOINTMENT (OUTPATIENT)
Dept: INTERVENTIONAL RADIOLOGY/VASCULAR | Facility: CLINIC | Age: 68
DRG: 268 | End: 2023-09-25
Attending: SURGERY
Payer: COMMERCIAL

## 2023-09-25 ENCOUNTER — ANESTHESIA (OUTPATIENT)
Dept: SURGERY | Facility: CLINIC | Age: 68
DRG: 268 | End: 2023-09-25
Payer: COMMERCIAL

## 2023-09-25 ENCOUNTER — ANESTHESIA EVENT (OUTPATIENT)
Dept: SURGERY | Facility: CLINIC | Age: 68
DRG: 268 | End: 2023-09-25
Payer: COMMERCIAL

## 2023-09-25 ENCOUNTER — APPOINTMENT (OUTPATIENT)
Dept: CARDIOLOGY | Facility: CLINIC | Age: 68
DRG: 268 | End: 2023-09-25
Attending: PHARMACIST
Payer: COMMERCIAL

## 2023-09-25 LAB
ALBUMIN SERPL BCG-MCNC: 2.3 G/DL (ref 3.5–5.2)
ALBUMIN SERPL BCG-MCNC: 2.4 G/DL (ref 3.5–5.2)
ALBUMIN SERPL BCG-MCNC: 2.5 G/DL (ref 3.5–5.2)
ALBUMIN SERPL BCG-MCNC: 2.6 G/DL (ref 3.5–5.2)
ALLEN'S TEST: ABNORMAL
ALP SERPL-CCNC: 36 U/L (ref 40–129)
ALP SERPL-CCNC: 37 U/L (ref 40–129)
ALP SERPL-CCNC: 42 U/L (ref 40–129)
ALP SERPL-CCNC: 43 U/L (ref 40–129)
ALT SERPL W P-5'-P-CCNC: 199 U/L (ref 0–70)
ALT SERPL W P-5'-P-CCNC: 295 U/L (ref 0–70)
ALT SERPL W P-5'-P-CCNC: 312 U/L (ref 0–70)
ALT SERPL W P-5'-P-CCNC: 327 U/L (ref 0–70)
ANION GAP SERPL CALCULATED.3IONS-SCNC: 11 MMOL/L (ref 7–15)
ANION GAP SERPL CALCULATED.3IONS-SCNC: 12 MMOL/L (ref 7–15)
ANION GAP SERPL CALCULATED.3IONS-SCNC: 13 MMOL/L (ref 7–15)
ANION GAP SERPL CALCULATED.3IONS-SCNC: 24 MMOL/L (ref 7–15)
APTT PPP: 136 SECONDS (ref 22–38)
APTT PPP: 32 SECONDS (ref 22–38)
APTT PPP: 34 SECONDS (ref 22–38)
APTT PPP: 35 SECONDS (ref 22–38)
APTT PPP: 35 SECONDS (ref 22–38)
APTT PPP: 39 SECONDS (ref 22–38)
AST SERPL W P-5'-P-CCNC: 1017 U/L (ref 0–45)
AST SERPL W P-5'-P-CCNC: 318 U/L (ref 0–45)
AST SERPL W P-5'-P-CCNC: ABNORMAL U/L
AST SERPL W P-5'-P-CCNC: ABNORMAL U/L
ATRIAL RATE - MUSE: 110 BPM
BASE EXCESS BLDA CALC-SCNC: -2.9 MMOL/L (ref -9–1.8)
BASE EXCESS BLDA CALC-SCNC: -8.6 MMOL/L (ref -9–1.8)
BASE EXCESS BLDA CALC-SCNC: 3.3 MMOL/L (ref -9–1.8)
BASOPHILS # BLD AUTO: 0 10E3/UL (ref 0–0.2)
BASOPHILS NFR BLD AUTO: 0 %
BILIRUB SERPL-MCNC: 0.5 MG/DL
BILIRUB SERPL-MCNC: 0.7 MG/DL
BILIRUB SERPL-MCNC: 0.8 MG/DL
BILIRUB SERPL-MCNC: 0.8 MG/DL
BLD PROD TYP BPU: NORMAL
BLOOD COMPONENT TYPE: NORMAL
BUN SERPL-MCNC: 14.1 MG/DL (ref 8–23)
BUN SERPL-MCNC: 21.5 MG/DL (ref 8–23)
BUN SERPL-MCNC: 23.5 MG/DL (ref 8–23)
BUN SERPL-MCNC: 26 MG/DL (ref 8–23)
CA-I BLD-MCNC: 4.4 MG/DL (ref 4.4–5.2)
CA-I BLD-MCNC: 4.5 MG/DL (ref 4.4–5.2)
CA-I BLD-MCNC: 4.7 MG/DL (ref 4.4–5.2)
CA-I BLD-MCNC: 5.3 MG/DL (ref 4.4–5.2)
CA-I BLD-MCNC: 5.4 MG/DL (ref 4.4–5.2)
CALCIUM SERPL-MCNC: 11.2 MG/DL (ref 8.8–10.2)
CALCIUM SERPL-MCNC: 8.3 MG/DL (ref 8.8–10.2)
CALCIUM SERPL-MCNC: 8.5 MG/DL (ref 8.8–10.2)
CALCIUM SERPL-MCNC: 9.8 MG/DL (ref 8.8–10.2)
CF REDUC 30M P MA P HEP LENFR BLD TEG: 0 % (ref 0–8)
CF REDUC 60M P MA P HEPASE LENFR BLD TEG: 0.5 % (ref 0–15)
CFT P HPASE BLD TEG: 1.4 MINUTE (ref 1–3)
CHLORIDE SERPL-SCNC: 101 MMOL/L (ref 98–107)
CHLORIDE SERPL-SCNC: 104 MMOL/L (ref 98–107)
CHLORIDE SERPL-SCNC: 104 MMOL/L (ref 98–107)
CHLORIDE SERPL-SCNC: 105 MMOL/L (ref 98–107)
CI (COAGULATION INDEX)(Z): 1.3 (ref -3–3)
CLOT ANGLE P HPASE BLD TEG: 69.8 DEGREES (ref 53–72)
CLOT INIT P HPASE BLD TEG: 5.2 MINUTE (ref 5–10)
CLOT STRENGTH P HPASE BLD TEG: 8.7 KD/SC (ref 4.5–11)
CODING SYSTEM: NORMAL
CREAT SERPL-MCNC: 1.08 MG/DL (ref 0.67–1.17)
CREAT SERPL-MCNC: 1.87 MG/DL (ref 0.67–1.17)
CREAT SERPL-MCNC: 2.4 MG/DL (ref 0.67–1.17)
CREAT SERPL-MCNC: 2.58 MG/DL (ref 0.67–1.17)
CROSSMATCH: NORMAL
DEPRECATED HCO3 PLAS-SCNC: 14 MMOL/L (ref 22–29)
DEPRECATED HCO3 PLAS-SCNC: 21 MMOL/L (ref 22–29)
DEPRECATED HCO3 PLAS-SCNC: 21 MMOL/L (ref 22–29)
DEPRECATED HCO3 PLAS-SCNC: 22 MMOL/L (ref 22–29)
DIASTOLIC BLOOD PRESSURE - MUSE: NORMAL MMHG
EGFRCR SERPLBLD CKD-EPI 2021: 26 ML/MIN/1.73M2
EGFRCR SERPLBLD CKD-EPI 2021: 28 ML/MIN/1.73M2
EGFRCR SERPLBLD CKD-EPI 2021: 38 ML/MIN/1.73M2
EGFRCR SERPLBLD CKD-EPI 2021: 74 ML/MIN/1.73M2
EOSINOPHIL # BLD AUTO: 0 10E3/UL (ref 0–0.7)
EOSINOPHIL NFR BLD AUTO: 0 %
ERYTHROCYTE [DISTWIDTH] IN BLOOD BY AUTOMATED COUNT: 15.2 % (ref 10–15)
ERYTHROCYTE [DISTWIDTH] IN BLOOD BY AUTOMATED COUNT: 16.8 % (ref 10–15)
ERYTHROCYTE [DISTWIDTH] IN BLOOD BY AUTOMATED COUNT: 16.8 % (ref 10–15)
ERYTHROCYTE [DISTWIDTH] IN BLOOD BY AUTOMATED COUNT: 17.1 % (ref 10–15)
ERYTHROCYTE [DISTWIDTH] IN BLOOD BY AUTOMATED COUNT: 17.4 % (ref 10–15)
ERYTHROCYTE [DISTWIDTH] IN BLOOD BY AUTOMATED COUNT: 18.1 % (ref 10–15)
ERYTHROCYTE [DISTWIDTH] IN BLOOD BY AUTOMATED COUNT: 18.2 % (ref 10–15)
FIBRINOGEN PPP-MCNC: 148 MG/DL (ref 170–490)
FIBRINOGEN PPP-MCNC: 216 MG/DL (ref 170–490)
FIBRINOGEN PPP-MCNC: 235 MG/DL (ref 170–490)
FIBRINOGEN PPP-MCNC: 275 MG/DL (ref 170–490)
FIBRINOGEN PPP-MCNC: 279 MG/DL (ref 170–490)
FIBRINOGEN PPP-MCNC: 281 MG/DL (ref 170–490)
GLUCOSE BLDC GLUCOMTR-MCNC: 104 MG/DL (ref 70–99)
GLUCOSE BLDC GLUCOMTR-MCNC: 104 MG/DL (ref 70–99)
GLUCOSE BLDC GLUCOMTR-MCNC: 107 MG/DL (ref 70–99)
GLUCOSE BLDC GLUCOMTR-MCNC: 120 MG/DL (ref 70–99)
GLUCOSE BLDC GLUCOMTR-MCNC: 133 MG/DL (ref 70–99)
GLUCOSE BLDC GLUCOMTR-MCNC: 136 MG/DL (ref 70–99)
GLUCOSE BLDC GLUCOMTR-MCNC: 138 MG/DL (ref 70–99)
GLUCOSE BLDC GLUCOMTR-MCNC: 52 MG/DL (ref 70–99)
GLUCOSE BLDC GLUCOMTR-MCNC: 71 MG/DL (ref 70–99)
GLUCOSE BLDC GLUCOMTR-MCNC: 72 MG/DL (ref 70–99)
GLUCOSE BLDC GLUCOMTR-MCNC: 73 MG/DL (ref 70–99)
GLUCOSE BLDC GLUCOMTR-MCNC: 89 MG/DL (ref 70–99)
GLUCOSE BLDC GLUCOMTR-MCNC: 90 MG/DL (ref 70–99)
GLUCOSE BLDC GLUCOMTR-MCNC: 92 MG/DL (ref 70–99)
GLUCOSE BLDC GLUCOMTR-MCNC: 93 MG/DL (ref 70–99)
GLUCOSE SERPL-MCNC: 115 MG/DL (ref 70–99)
GLUCOSE SERPL-MCNC: 115 MG/DL (ref 70–99)
GLUCOSE SERPL-MCNC: 118 MG/DL (ref 70–99)
GLUCOSE SERPL-MCNC: 140 MG/DL (ref 70–99)
HBA1C MFR BLD: 5.7 %
HCO3 BLD-SCNC: 16 MMOL/L (ref 21–28)
HCO3 BLD-SCNC: 17 MMOL/L (ref 21–28)
HCO3 BLD-SCNC: 27 MMOL/L (ref 21–28)
HCT VFR BLD AUTO: 28.8 % (ref 40–53)
HCT VFR BLD AUTO: 33.1 % (ref 40–53)
HCT VFR BLD AUTO: 33.9 % (ref 40–53)
HCT VFR BLD AUTO: 35.6 % (ref 40–53)
HCT VFR BLD AUTO: 36.5 % (ref 40–53)
HCT VFR BLD AUTO: 38.3 % (ref 40–53)
HCT VFR BLD AUTO: 39.3 % (ref 40–53)
HGB BLD-MCNC: 11.4 G/DL (ref 13.3–17.7)
HGB BLD-MCNC: 11.8 G/DL (ref 13.3–17.7)
HGB BLD-MCNC: 12.5 G/DL (ref 13.3–17.7)
HGB BLD-MCNC: 13.2 G/DL (ref 13.3–17.7)
HGB BLD-MCNC: 13.9 G/DL (ref 13.3–17.7)
HGB BLD-MCNC: 14.3 G/DL (ref 13.3–17.7)
HGB BLD-MCNC: 9.3 G/DL (ref 13.3–17.7)
IMM GRANULOCYTES # BLD: 0.2 10E3/UL
IMM GRANULOCYTES NFR BLD: 4 %
INR PPP: 1.35 (ref 0.85–1.15)
INR PPP: 1.39 (ref 0.85–1.15)
INR PPP: 1.42 (ref 0.85–1.15)
INR PPP: 1.43 (ref 0.85–1.15)
INR PPP: 1.71 (ref 0.85–1.15)
INR PPP: 1.72 (ref 0.85–1.15)
INTERPRETATION ECG - MUSE: NORMAL
ISSUE DATE AND TIME: NORMAL
LACTATE SERPL-SCNC: 11.2 MMOL/L (ref 0.7–2)
LACTATE SERPL-SCNC: 2 MMOL/L (ref 0.7–2)
LACTATE SERPL-SCNC: 2.2 MMOL/L (ref 0.7–2)
LACTATE SERPL-SCNC: 2.6 MMOL/L (ref 0.7–2)
LACTATE SERPL-SCNC: 6.1 MMOL/L (ref 0.7–2)
LACTATE SERPL-SCNC: 6.4 MMOL/L (ref 0.7–2)
LVEF ECHO: NORMAL
LYMPHOCYTES # BLD AUTO: 0.6 10E3/UL (ref 0.8–5.3)
LYMPHOCYTES NFR BLD AUTO: 11 %
MAGNESIUM SERPL-MCNC: 1.8 MG/DL (ref 1.7–2.3)
MAGNESIUM SERPL-MCNC: 2.1 MG/DL (ref 1.7–2.3)
MAGNESIUM SERPL-MCNC: 2.2 MG/DL (ref 1.7–2.3)
MAGNESIUM SERPL-MCNC: 2.3 MG/DL (ref 1.7–2.3)
MCF P HPASE BLD TEG: 63.4 MM (ref 50–70)
MCH RBC QN AUTO: 32.9 PG (ref 26.5–33)
MCH RBC QN AUTO: 33.4 PG (ref 26.5–33)
MCH RBC QN AUTO: 33.4 PG (ref 26.5–33)
MCH RBC QN AUTO: 33.6 PG (ref 26.5–33)
MCH RBC QN AUTO: 33.8 PG (ref 26.5–33)
MCH RBC QN AUTO: 34 PG (ref 26.5–33)
MCH RBC QN AUTO: 34.7 PG (ref 26.5–33)
MCHC RBC AUTO-ENTMCNC: 32.3 G/DL (ref 31.5–36.5)
MCHC RBC AUTO-ENTMCNC: 33.6 G/DL (ref 31.5–36.5)
MCHC RBC AUTO-ENTMCNC: 35.1 G/DL (ref 31.5–36.5)
MCHC RBC AUTO-ENTMCNC: 35.6 G/DL (ref 31.5–36.5)
MCHC RBC AUTO-ENTMCNC: 36.2 G/DL (ref 31.5–36.5)
MCHC RBC AUTO-ENTMCNC: 36.3 G/DL (ref 31.5–36.5)
MCHC RBC AUTO-ENTMCNC: 36.4 G/DL (ref 31.5–36.5)
MCV RBC AUTO: 108 FL (ref 78–100)
MCV RBC AUTO: 92 FL (ref 78–100)
MCV RBC AUTO: 93 FL (ref 78–100)
MCV RBC AUTO: 94 FL (ref 78–100)
MCV RBC AUTO: 94 FL (ref 78–100)
MCV RBC AUTO: 95 FL (ref 78–100)
MCV RBC AUTO: 98 FL (ref 78–100)
MONOCYTES # BLD AUTO: 0.2 10E3/UL (ref 0–1.3)
MONOCYTES NFR BLD AUTO: 3 %
MRSA DNA SPEC QL NAA+PROBE: NEGATIVE
NEUTROPHILS # BLD AUTO: 4 10E3/UL (ref 1.6–8.3)
NEUTROPHILS NFR BLD AUTO: 82 %
NRBC # BLD AUTO: 0 10E3/UL
NRBC BLD AUTO-RTO: 0 /100
O2/TOTAL GAS SETTING VFR VENT: 50 %
OXYHGB MFR BLD: 98 % (ref 92–100)
OXYHGB MFR BLD: 99 % (ref 92–100)
P AXIS - MUSE: 58 DEGREES
PCO2 BLD: 19 MM HG (ref 35–45)
PCO2 BLD: 28 MM HG (ref 35–45)
PCO2 BLD: 36 MM HG (ref 35–45)
PH BLD: 7.36 [PH] (ref 7.35–7.45)
PH BLD: 7.48 [PH] (ref 7.35–7.45)
PH BLD: 7.56 [PH] (ref 7.35–7.45)
PHOSPHATE SERPL-MCNC: 2.7 MG/DL (ref 2.5–4.5)
PHOSPHATE SERPL-MCNC: 4.4 MG/DL (ref 2.5–4.5)
PHOSPHATE SERPL-MCNC: 4.9 MG/DL (ref 2.5–4.5)
PHOSPHATE SERPL-MCNC: 5 MG/DL (ref 2.5–4.5)
PLATELET # BLD AUTO: 102 10E3/UL (ref 150–450)
PLATELET # BLD AUTO: 124 10E3/UL (ref 150–450)
PLATELET # BLD AUTO: 127 10E3/UL (ref 150–450)
PLATELET # BLD AUTO: 149 10E3/UL (ref 150–450)
PLATELET # BLD AUTO: 65 10E3/UL (ref 150–450)
PLATELET # BLD AUTO: 68 10E3/UL (ref 150–450)
PLATELET # BLD AUTO: 95 10E3/UL (ref 150–450)
PO2 BLD: 162 MM HG (ref 80–105)
PO2 BLD: 163 MM HG (ref 80–105)
PO2 BLD: 186 MM HG (ref 80–105)
POTASSIUM BLD-SCNC: 2.5 MMOL/L (ref 3.4–5.3)
POTASSIUM SERPL-SCNC: 4.3 MMOL/L (ref 3.4–5.3)
POTASSIUM SERPL-SCNC: 5.1 MMOL/L (ref 3.4–5.3)
POTASSIUM SERPL-SCNC: 5.5 MMOL/L (ref 3.4–5.3)
POTASSIUM SERPL-SCNC: 5.5 MMOL/L (ref 3.4–5.3)
POTASSIUM SERPL-SCNC: 5.9 MMOL/L (ref 3.4–5.3)
PR INTERVAL - MUSE: 150 MS
PROT SERPL-MCNC: 3.8 G/DL (ref 6.4–8.3)
PROT SERPL-MCNC: 4 G/DL (ref 6.4–8.3)
PROT SERPL-MCNC: 4.1 G/DL (ref 6.4–8.3)
PROT SERPL-MCNC: 4.2 G/DL (ref 6.4–8.3)
QRS DURATION - MUSE: 72 MS
QT - MUSE: 346 MS
QTC - MUSE: 468 MS
R AXIS - MUSE: -5 DEGREES
RBC # BLD AUTO: 2.68 10E6/UL (ref 4.4–5.9)
RBC # BLD AUTO: 3.46 10E6/UL (ref 4.4–5.9)
RBC # BLD AUTO: 3.51 10E6/UL (ref 4.4–5.9)
RBC # BLD AUTO: 3.74 10E6/UL (ref 4.4–5.9)
RBC # BLD AUTO: 3.88 10E6/UL (ref 4.4–5.9)
RBC # BLD AUTO: 4.16 10E6/UL (ref 4.4–5.9)
RBC # BLD AUTO: 4.23 10E6/UL (ref 4.4–5.9)
SA TARGET DNA: NEGATIVE
SODIUM SERPL-SCNC: 137 MMOL/L (ref 136–145)
SODIUM SERPL-SCNC: 138 MMOL/L (ref 136–145)
SODIUM SERPL-SCNC: 138 MMOL/L (ref 136–145)
SODIUM SERPL-SCNC: 139 MMOL/L (ref 136–145)
SYSTOLIC BLOOD PRESSURE - MUSE: NORMAL MMHG
T AXIS - MUSE: 37 DEGREES
UNIT ABO/RH: NORMAL
UNIT NUMBER: NORMAL
UNIT STATUS: NORMAL
UNIT TYPE ISBT: 5100
UNIT TYPE ISBT: 6200
UNIT TYPE ISBT: 7300
UNIT TYPE ISBT: 7300
UNIT TYPE ISBT: 8400
VENTRICULAR RATE- MUSE: 110 BPM
WBC # BLD AUTO: 4.5 10E3/UL (ref 4–11)
WBC # BLD AUTO: 4.5 10E3/UL (ref 4–11)
WBC # BLD AUTO: 4.8 10E3/UL (ref 4–11)
WBC # BLD AUTO: 4.8 10E3/UL (ref 4–11)
WBC # BLD AUTO: 4.9 10E3/UL (ref 4–11)
WBC # BLD AUTO: 5.5 10E3/UL (ref 4–11)
WBC # BLD AUTO: 5.6 10E3/UL (ref 4–11)

## 2023-09-25 PROCEDURE — 84132 ASSAY OF SERUM POTASSIUM: CPT | Performed by: SURGERY

## 2023-09-25 PROCEDURE — 200N000002 HC R&B ICU UMMC

## 2023-09-25 PROCEDURE — 93005 ELECTROCARDIOGRAM TRACING: CPT

## 2023-09-25 PROCEDURE — 250N000011 HC RX IP 250 OP 636: Performed by: PHARMACIST

## 2023-09-25 PROCEDURE — 94002 VENT MGMT INPAT INIT DAY: CPT

## 2023-09-25 PROCEDURE — 82330 ASSAY OF CALCIUM: CPT | Performed by: SURGERY

## 2023-09-25 PROCEDURE — C9113 INJ PANTOPRAZOLE SODIUM, VIA: HCPCS | Mod: JZ | Performed by: STUDENT IN AN ORGANIZED HEALTH CARE EDUCATION/TRAINING PROGRAM

## 2023-09-25 PROCEDURE — 99291 CRITICAL CARE FIRST HOUR: CPT | Mod: GC | Performed by: SURGERY

## 2023-09-25 PROCEDURE — 250N000011 HC RX IP 250 OP 636: Mod: JZ | Performed by: SURGERY

## 2023-09-25 PROCEDURE — 250N000011 HC RX IP 250 OP 636: Performed by: STUDENT IN AN ORGANIZED HEALTH CARE EDUCATION/TRAINING PROGRAM

## 2023-09-25 PROCEDURE — 04CN0ZZ EXTIRPATION OF MATTER FROM LEFT POPLITEAL ARTERY, OPEN APPROACH: ICD-10-PCS | Performed by: SURGERY

## 2023-09-25 PROCEDURE — 85347 COAGULATION TIME ACTIVATED: CPT

## 2023-09-25 PROCEDURE — 82803 BLOOD GASES ANY COMBINATION: CPT | Performed by: STUDENT IN AN ORGANIZED HEALTH CARE EDUCATION/TRAINING PROGRAM

## 2023-09-25 PROCEDURE — 85730 THROMBOPLASTIN TIME PARTIAL: CPT | Performed by: EMERGENCY MEDICINE

## 2023-09-25 PROCEDURE — 34201 REMOVAL OF ARTERY CLOT: CPT | Mod: 58 | Performed by: SURGERY

## 2023-09-25 PROCEDURE — 74018 RADEX ABDOMEN 1 VIEW: CPT | Mod: 26 | Performed by: RADIOLOGY

## 2023-09-25 PROCEDURE — P9012 CRYOPRECIPITATE EACH UNIT: HCPCS

## 2023-09-25 PROCEDURE — P9037 PLATE PHERES LEUKOREDU IRRAD: HCPCS

## 2023-09-25 PROCEDURE — 99222 1ST HOSP IP/OBS MODERATE 55: CPT | Performed by: DIETITIAN, REGISTERED

## 2023-09-25 PROCEDURE — 99291 CRITICAL CARE FIRST HOUR: CPT | Mod: GC | Performed by: STUDENT IN AN ORGANIZED HEALTH CARE EDUCATION/TRAINING PROGRAM

## 2023-09-25 PROCEDURE — 84295 ASSAY OF SERUM SODIUM: CPT

## 2023-09-25 PROCEDURE — 0WJG0ZZ INSPECTION OF PERITONEAL CAVITY, OPEN APPROACH: ICD-10-PCS | Performed by: SURGERY

## 2023-09-25 PROCEDURE — 360N000077 HC SURGERY LEVEL 4, PER MIN: Performed by: SURGERY

## 2023-09-25 PROCEDURE — P9045 ALBUMIN (HUMAN), 5%, 250 ML: HCPCS | Mod: JZ

## 2023-09-25 PROCEDURE — 85610 PROTHROMBIN TIME: CPT | Performed by: PHYSICIAN ASSISTANT

## 2023-09-25 PROCEDURE — 999N000248 HC STATISTIC IV INSERT WITH US BY RN

## 2023-09-25 PROCEDURE — 82330 ASSAY OF CALCIUM: CPT | Performed by: PHARMACIST

## 2023-09-25 PROCEDURE — 258N000003 HC RX IP 258 OP 636: Performed by: PHARMACIST

## 2023-09-25 PROCEDURE — 85027 COMPLETE CBC AUTOMATED: CPT | Performed by: PHARMACIST

## 2023-09-25 PROCEDURE — C1757 CATH, THROMBECTOMY/EMBOLECT: HCPCS | Performed by: SURGERY

## 2023-09-25 PROCEDURE — 71045 X-RAY EXAM CHEST 1 VIEW: CPT | Mod: 26 | Performed by: RADIOLOGY

## 2023-09-25 PROCEDURE — 49000 EXPLORATION OF ABDOMEN: CPT | Mod: 52 | Performed by: SURGERY

## 2023-09-25 PROCEDURE — 250N000025 HC SEVOFLURANE, PER MIN: Performed by: SURGERY

## 2023-09-25 PROCEDURE — 93306 TTE W/DOPPLER COMPLETE: CPT | Mod: 26 | Performed by: INTERNAL MEDICINE

## 2023-09-25 PROCEDURE — 999N000083 IR OR ANGIOGRAM

## 2023-09-25 PROCEDURE — P9037 PLATE PHERES LEUKOREDU IRRAD: HCPCS | Performed by: PHARMACIST

## 2023-09-25 PROCEDURE — 99233 SBSQ HOSP IP/OBS HIGH 50: CPT | Mod: 24 | Performed by: SURGERY

## 2023-09-25 PROCEDURE — 258N000003 HC RX IP 258 OP 636: Performed by: STUDENT IN AN ORGANIZED HEALTH CARE EDUCATION/TRAINING PROGRAM

## 2023-09-25 PROCEDURE — 258N000003 HC RX IP 258 OP 636: Performed by: NURSE ANESTHETIST, CERTIFIED REGISTERED

## 2023-09-25 PROCEDURE — 999N000063 XR ABDOMEN PORT 1 VIEW

## 2023-09-25 PROCEDURE — P9059 PLASMA, FRZ BETWEEN 8-24HOUR: HCPCS

## 2023-09-25 PROCEDURE — 85384 FIBRINOGEN ACTIVITY: CPT | Performed by: PHARMACIST

## 2023-09-25 PROCEDURE — P9016 RBC LEUKOCYTES REDUCED: HCPCS

## 2023-09-25 PROCEDURE — 85384 FIBRINOGEN ACTIVITY: CPT | Performed by: EMERGENCY MEDICINE

## 2023-09-25 PROCEDURE — 250N000011 HC RX IP 250 OP 636: Performed by: ANESTHESIOLOGY

## 2023-09-25 PROCEDURE — 83036 HEMOGLOBIN GLYCOSYLATED A1C: CPT | Performed by: PHARMACIST

## 2023-09-25 PROCEDURE — 999N000208 ECHOCARDIOGRAM COMPLETE

## 2023-09-25 PROCEDURE — P9045 ALBUMIN (HUMAN), 5%, 250 ML: HCPCS | Mod: JZ | Performed by: NURSE ANESTHETIST, CERTIFIED REGISTERED

## 2023-09-25 PROCEDURE — P9059 PLASMA, FRZ BETWEEN 8-24HOUR: HCPCS | Performed by: PHARMACIST

## 2023-09-25 PROCEDURE — 84460 ALANINE AMINO (ALT) (SGPT): CPT | Performed by: PHYSICIAN ASSISTANT

## 2023-09-25 PROCEDURE — 93010 ELECTROCARDIOGRAM REPORT: CPT | Performed by: INTERNAL MEDICINE

## 2023-09-25 PROCEDURE — 85610 PROTHROMBIN TIME: CPT | Performed by: EMERGENCY MEDICINE

## 2023-09-25 PROCEDURE — 03HY32Z INSERTION OF MONITORING DEVICE INTO UPPER ARTERY, PERCUTANEOUS APPROACH: ICD-10-PCS | Performed by: SURGERY

## 2023-09-25 PROCEDURE — 370N000017 HC ANESTHESIA TECHNICAL FEE, PER MIN: Performed by: SURGERY

## 2023-09-25 PROCEDURE — 04CL0ZZ EXTIRPATION OF MATTER FROM LEFT FEMORAL ARTERY, OPEN APPROACH: ICD-10-PCS | Performed by: SURGERY

## 2023-09-25 PROCEDURE — 250N000011 HC RX IP 250 OP 636: Mod: JZ

## 2023-09-25 PROCEDURE — 83605 ASSAY OF LACTIC ACID: CPT | Performed by: STUDENT IN AN ORGANIZED HEALTH CARE EDUCATION/TRAINING PROGRAM

## 2023-09-25 PROCEDURE — 83735 ASSAY OF MAGNESIUM: CPT | Performed by: PHYSICIAN ASSISTANT

## 2023-09-25 PROCEDURE — 84100 ASSAY OF PHOSPHORUS: CPT | Performed by: STUDENT IN AN ORGANIZED HEALTH CARE EDUCATION/TRAINING PROGRAM

## 2023-09-25 PROCEDURE — 250N000009 HC RX 250: Performed by: SURGERY

## 2023-09-25 PROCEDURE — 82247 BILIRUBIN TOTAL: CPT | Performed by: STUDENT IN AN ORGANIZED HEALTH CARE EDUCATION/TRAINING PROGRAM

## 2023-09-25 PROCEDURE — 84100 ASSAY OF PHOSPHORUS: CPT | Performed by: PHYSICIAN ASSISTANT

## 2023-09-25 PROCEDURE — 5A1955Z RESPIRATORY VENTILATION, GREATER THAN 96 CONSECUTIVE HOURS: ICD-10-PCS | Performed by: SURGERY

## 2023-09-25 PROCEDURE — 255N000002 HC RX 255 OP 636: Performed by: INTERNAL MEDICINE

## 2023-09-25 PROCEDURE — 85730 THROMBOPLASTIN TIME PARTIAL: CPT | Performed by: PHARMACIST

## 2023-09-25 PROCEDURE — 258N000003 HC RX IP 258 OP 636

## 2023-09-25 PROCEDURE — 88304 TISSUE EXAM BY PATHOLOGIST: CPT | Mod: TC | Performed by: SURGERY

## 2023-09-25 PROCEDURE — 80069 RENAL FUNCTION PANEL: CPT | Performed by: STUDENT IN AN ORGANIZED HEALTH CARE EDUCATION/TRAINING PROGRAM

## 2023-09-25 PROCEDURE — 258N000003 HC RX IP 258 OP 636: Performed by: SURGERY

## 2023-09-25 PROCEDURE — 83735 ASSAY OF MAGNESIUM: CPT | Performed by: STUDENT IN AN ORGANIZED HEALTH CARE EDUCATION/TRAINING PROGRAM

## 2023-09-25 PROCEDURE — 85027 COMPLETE CBC AUTOMATED: CPT | Performed by: PHYSICIAN ASSISTANT

## 2023-09-25 PROCEDURE — 82803 BLOOD GASES ANY COMBINATION: CPT

## 2023-09-25 PROCEDURE — 250N000011 HC RX IP 250 OP 636: Mod: JZ | Performed by: NURSE ANESTHETIST, CERTIFIED REGISTERED

## 2023-09-25 PROCEDURE — 85384 FIBRINOGEN ACTIVITY: CPT | Performed by: PHYSICIAN ASSISTANT

## 2023-09-25 PROCEDURE — 87640 STAPH A DNA AMP PROBE: CPT | Performed by: PHYSICIAN ASSISTANT

## 2023-09-25 PROCEDURE — 85610 PROTHROMBIN TIME: CPT | Performed by: PHARMACIST

## 2023-09-25 PROCEDURE — 85730 THROMBOPLASTIN TIME PARTIAL: CPT | Performed by: PHYSICIAN ASSISTANT

## 2023-09-25 PROCEDURE — 0DJD8ZZ INSPECTION OF LOWER INTESTINAL TRACT, VIA NATURAL OR ARTIFICIAL OPENING ENDOSCOPIC: ICD-10-PCS | Performed by: INTERNAL MEDICINE

## 2023-09-25 PROCEDURE — 250N000009 HC RX 250: Performed by: STUDENT IN AN ORGANIZED HEALTH CARE EDUCATION/TRAINING PROGRAM

## 2023-09-25 PROCEDURE — 258N000001 HC RX 258: Performed by: PHARMACIST

## 2023-09-25 PROCEDURE — 272N000001 HC OR GENERAL SUPPLY STERILE: Performed by: SURGERY

## 2023-09-25 PROCEDURE — 250N000009 HC RX 250: Performed by: NURSE ANESTHETIST, CERTIFIED REGISTERED

## 2023-09-25 PROCEDURE — 82805 BLOOD GASES W/O2 SATURATION: CPT | Performed by: PHARMACIST

## 2023-09-25 PROCEDURE — 85396 CLOTTING ASSAY WHOLE BLOOD: CPT | Performed by: STUDENT IN AN ORGANIZED HEALTH CARE EDUCATION/TRAINING PROGRAM

## 2023-09-25 PROCEDURE — 45330 DIAGNOSTIC SIGMOIDOSCOPY: CPT | Performed by: INTERNAL MEDICINE

## 2023-09-25 PROCEDURE — 250N000011 HC RX IP 250 OP 636: Performed by: SURGERY

## 2023-09-25 PROCEDURE — 250N000009 HC RX 250: Performed by: PHARMACIST

## 2023-09-25 PROCEDURE — 85396 CLOTTING ASSAY WHOLE BLOOD: CPT

## 2023-09-25 PROCEDURE — 999N000063 XR CHEST PORT 1 VIEW

## 2023-09-25 PROCEDURE — 250N000013 HC RX MED GY IP 250 OP 250 PS 637: Performed by: STUDENT IN AN ORGANIZED HEALTH CARE EDUCATION/TRAINING PROGRAM

## 2023-09-25 PROCEDURE — 83605 ASSAY OF LACTIC ACID: CPT | Performed by: PHARMACIST

## 2023-09-25 PROCEDURE — 999N000157 HC STATISTIC RCP TIME EA 10 MIN

## 2023-09-25 PROCEDURE — 85025 COMPLETE CBC W/AUTO DIFF WBC: CPT | Performed by: EMERGENCY MEDICINE

## 2023-09-25 PROCEDURE — 27602 DECOMPRESSION OF LOWER LEG: CPT | Mod: 58 | Performed by: SURGERY

## 2023-09-25 DEVICE — HEMAGARD KNITTED BIFURCATED COLLAGEN COATED VASCULAR GRAFT
Type: IMPLANTABLE DEVICE | Site: ABDOMEN | Status: FUNCTIONAL
Brand: HEMAGARD

## 2023-09-25 DEVICE — BARD® PTFE FELT, 10.2 CM X 10.2 CM
Type: IMPLANTABLE DEVICE | Site: ABDOMEN | Status: FUNCTIONAL
Brand: BARD® PTFE FELT

## 2023-09-25 RX ORDER — HYDRALAZINE HYDROCHLORIDE 20 MG/ML
10 INJECTION INTRAMUSCULAR; INTRAVENOUS EVERY 6 HOURS PRN
Status: DISCONTINUED | OUTPATIENT
Start: 2023-09-25 | End: 2023-10-01

## 2023-09-25 RX ORDER — FIBRINOGEN (HUMAN) 700-1300MG
1050 KIT INTRAVENOUS ONCE
Status: COMPLETED | OUTPATIENT
Start: 2023-09-25 | End: 2023-09-25

## 2023-09-25 RX ORDER — SODIUM CHLORIDE, SODIUM LACTATE, POTASSIUM CHLORIDE, CALCIUM CHLORIDE 600; 310; 30; 20 MG/100ML; MG/100ML; MG/100ML; MG/100ML
INJECTION, SOLUTION INTRAVENOUS CONTINUOUS PRN
Status: DISCONTINUED | OUTPATIENT
Start: 2023-09-25 | End: 2023-09-25

## 2023-09-25 RX ORDER — NALOXONE HYDROCHLORIDE 0.4 MG/ML
0.4 INJECTION, SOLUTION INTRAMUSCULAR; INTRAVENOUS; SUBCUTANEOUS
Status: DISCONTINUED | OUTPATIENT
Start: 2023-09-25 | End: 2023-11-17 | Stop reason: HOSPADM

## 2023-09-25 RX ORDER — NALOXONE HYDROCHLORIDE 0.4 MG/ML
0.2 INJECTION, SOLUTION INTRAMUSCULAR; INTRAVENOUS; SUBCUTANEOUS
Status: DISCONTINUED | OUTPATIENT
Start: 2023-09-25 | End: 2023-11-17 | Stop reason: HOSPADM

## 2023-09-25 RX ORDER — CALCIUM CHLORIDE 100 MG/ML
INJECTION INTRAVENOUS; INTRAVENTRICULAR PRN
Status: DISCONTINUED | OUTPATIENT
Start: 2023-09-25 | End: 2023-09-25

## 2023-09-25 RX ORDER — AMOXICILLIN 250 MG
1 CAPSULE ORAL 2 TIMES DAILY
Status: DISCONTINUED | OUTPATIENT
Start: 2023-09-25 | End: 2023-10-21

## 2023-09-25 RX ORDER — CEFEPIME HYDROCHLORIDE 2 G/1
2 INJECTION, POWDER, FOR SOLUTION INTRAVENOUS EVERY 8 HOURS
Status: DISCONTINUED | OUTPATIENT
Start: 2023-09-25 | End: 2023-09-25 | Stop reason: DRUGHIGH

## 2023-09-25 RX ORDER — NICOTINE POLACRILEX 4 MG
15-30 LOZENGE BUCCAL
Status: DISCONTINUED | OUTPATIENT
Start: 2023-09-25 | End: 2023-09-25

## 2023-09-25 RX ORDER — ACETAMINOPHEN 325 MG/1
650 TABLET ORAL EVERY 6 HOURS PRN
Status: DISCONTINUED | OUTPATIENT
Start: 2023-09-25 | End: 2023-11-17 | Stop reason: HOSPADM

## 2023-09-25 RX ORDER — FIBRINOGEN (HUMAN) 700-1300MG
1050 KIT INTRAVENOUS ONCE
Status: DISCONTINUED | OUTPATIENT
Start: 2023-09-25 | End: 2023-09-26

## 2023-09-25 RX ORDER — METRONIDAZOLE 500 MG/100ML
500 INJECTION, SOLUTION INTRAVENOUS EVERY 12 HOURS
Status: DISCONTINUED | OUTPATIENT
Start: 2023-09-25 | End: 2023-09-30

## 2023-09-25 RX ORDER — PROCHLORPERAZINE MALEATE 5 MG
5 TABLET ORAL EVERY 6 HOURS PRN
Status: DISCONTINUED | OUTPATIENT
Start: 2023-09-25 | End: 2023-11-17 | Stop reason: HOSPADM

## 2023-09-25 RX ORDER — MAGNESIUM SULFATE HEPTAHYDRATE 40 MG/ML
2 INJECTION, SOLUTION INTRAVENOUS ONCE
Status: COMPLETED | OUTPATIENT
Start: 2023-09-25 | End: 2023-09-25

## 2023-09-25 RX ORDER — NOREPINEPHRINE BITARTRATE 0.06 MG/ML
.01-.6 INJECTION, SOLUTION INTRAVENOUS CONTINUOUS
Status: DISCONTINUED | OUTPATIENT
Start: 2023-09-25 | End: 2023-10-03

## 2023-09-25 RX ORDER — PIPERACILLIN SODIUM, TAZOBACTAM SODIUM 3; .375 G/15ML; G/15ML
3.38 INJECTION, POWDER, LYOPHILIZED, FOR SOLUTION INTRAVENOUS EVERY 6 HOURS
Status: DISCONTINUED | OUTPATIENT
Start: 2023-09-25 | End: 2023-09-25

## 2023-09-25 RX ORDER — DEXTROSE MONOHYDRATE 25 G/50ML
25-50 INJECTION, SOLUTION INTRAVENOUS
Status: DISCONTINUED | OUTPATIENT
Start: 2023-09-25 | End: 2023-09-25

## 2023-09-25 RX ORDER — CEFEPIME HYDROCHLORIDE 2 G/1
2 INJECTION, POWDER, FOR SOLUTION INTRAVENOUS EVERY 12 HOURS
Status: DISCONTINUED | OUTPATIENT
Start: 2023-09-25 | End: 2023-09-26

## 2023-09-25 RX ORDER — CHLORHEXIDINE GLUCONATE ORAL RINSE 1.2 MG/ML
15 SOLUTION DENTAL EVERY 12 HOURS
Status: DISCONTINUED | OUTPATIENT
Start: 2023-09-25 | End: 2023-10-03

## 2023-09-25 RX ORDER — POLYETHYLENE GLYCOL 3350 17 G/17G
17 POWDER, FOR SOLUTION ORAL DAILY
Status: DISCONTINUED | OUTPATIENT
Start: 2023-09-26 | End: 2023-10-21

## 2023-09-25 RX ORDER — CEFAZOLIN SODIUM 1 G/3ML
1 INJECTION, POWDER, FOR SOLUTION INTRAMUSCULAR; INTRAVENOUS EVERY 8 HOURS
Status: DISCONTINUED | OUTPATIENT
Start: 2023-09-25 | End: 2023-09-25

## 2023-09-25 RX ORDER — ONDANSETRON 4 MG/1
4 TABLET, ORALLY DISINTEGRATING ORAL EVERY 6 HOURS PRN
Status: DISCONTINUED | OUTPATIENT
Start: 2023-09-25 | End: 2023-11-17 | Stop reason: HOSPADM

## 2023-09-25 RX ORDER — NICOTINE POLACRILEX 4 MG
15-30 LOZENGE BUCCAL
Status: DISCONTINUED | OUTPATIENT
Start: 2023-09-25 | End: 2023-11-17 | Stop reason: HOSPADM

## 2023-09-25 RX ORDER — DEXTROSE MONOHYDRATE 100 MG/ML
INJECTION, SOLUTION INTRAVENOUS CONTINUOUS PRN
Status: DISCONTINUED | OUTPATIENT
Start: 2023-09-25 | End: 2023-11-17 | Stop reason: HOSPADM

## 2023-09-25 RX ORDER — PROPOFOL 10 MG/ML
5-75 INJECTION, EMULSION INTRAVENOUS CONTINUOUS
Status: DISCONTINUED | OUTPATIENT
Start: 2023-09-25 | End: 2023-10-01

## 2023-09-25 RX ORDER — ONDANSETRON 2 MG/ML
4 INJECTION INTRAMUSCULAR; INTRAVENOUS EVERY 6 HOURS PRN
Status: DISCONTINUED | OUTPATIENT
Start: 2023-09-25 | End: 2023-11-17 | Stop reason: HOSPADM

## 2023-09-25 RX ORDER — DEXTROSE MONOHYDRATE 25 G/50ML
25-50 INJECTION, SOLUTION INTRAVENOUS
Status: DISCONTINUED | OUTPATIENT
Start: 2023-09-25 | End: 2023-11-17 | Stop reason: HOSPADM

## 2023-09-25 RX ORDER — POTASSIUM CHLORIDE 29.8 MG/ML
20 INJECTION INTRAVENOUS
Status: COMPLETED | OUTPATIENT
Start: 2023-09-25 | End: 2023-09-25

## 2023-09-25 RX ORDER — EPINEPHRINE 0.1 MG/ML
INJECTION INTRAVENOUS PRN
Status: DISCONTINUED | OUTPATIENT
Start: 2023-09-25 | End: 2023-09-25

## 2023-09-25 RX ORDER — SODIUM CHLORIDE 9 MG/ML
INJECTION, SOLUTION INTRAVENOUS CONTINUOUS
Status: DISCONTINUED | OUTPATIENT
Start: 2023-09-25 | End: 2023-09-26

## 2023-09-25 RX ORDER — NOREPINEPHRINE BITARTRATE 0.02 MG/ML
.01-.6 INJECTION, SOLUTION INTRAVENOUS CONTINUOUS
Status: DISCONTINUED | OUTPATIENT
Start: 2023-09-25 | End: 2023-09-25

## 2023-09-25 RX ORDER — DEXMEDETOMIDINE HYDROCHLORIDE 4 UG/ML
.1-1.2 INJECTION, SOLUTION INTRAVENOUS CONTINUOUS
Status: DISCONTINUED | OUTPATIENT
Start: 2023-09-25 | End: 2023-09-30

## 2023-09-25 RX ORDER — LIDOCAINE 40 MG/G
CREAM TOPICAL
Status: DISCONTINUED | OUTPATIENT
Start: 2023-09-25 | End: 2023-10-13

## 2023-09-25 RX ORDER — BISACODYL 10 MG
10 SUPPOSITORY, RECTAL RECTAL DAILY PRN
Status: DISCONTINUED | OUTPATIENT
Start: 2023-09-25 | End: 2023-11-17 | Stop reason: HOSPADM

## 2023-09-25 RX ORDER — SODIUM CHLORIDE, SODIUM LACTATE, POTASSIUM CHLORIDE, CALCIUM CHLORIDE 600; 310; 30; 20 MG/100ML; MG/100ML; MG/100ML; MG/100ML
INJECTION, SOLUTION INTRAVENOUS CONTINUOUS
Status: DISCONTINUED | OUTPATIENT
Start: 2023-09-25 | End: 2023-09-25

## 2023-09-25 RX ADMIN — Medication 50 MG: at 16:28

## 2023-09-25 RX ADMIN — SODIUM CHLORIDE, POTASSIUM CHLORIDE, SODIUM LACTATE AND CALCIUM CHLORIDE 1000 ML: 600; 310; 30; 20 INJECTION, SOLUTION INTRAVENOUS at 13:54

## 2023-09-25 RX ADMIN — INSULIN HUMAN 6 UNITS/HR: 1 INJECTION, SOLUTION INTRAVENOUS at 00:47

## 2023-09-25 RX ADMIN — ALBUMIN (HUMAN): 12.5 SOLUTION INTRAVENOUS at 17:00

## 2023-09-25 RX ADMIN — DEXTROSE MONOHYDRATE 25 ML: 25 INJECTION, SOLUTION INTRAVENOUS at 23:50

## 2023-09-25 RX ADMIN — CEFEPIME HYDROCHLORIDE 2 G: 2 INJECTION, POWDER, FOR SOLUTION INTRAVENOUS at 21:16

## 2023-09-25 RX ADMIN — SODIUM PHOSPHATE, MONOBASIC, MONOHYDRATE AND SODIUM PHOSPHATE, DIBASIC, ANHYDROUS 9 MMOL: 276; 142 INJECTION, SOLUTION INTRAVENOUS at 14:33

## 2023-09-25 RX ADMIN — POTASSIUM CHLORIDE 20 MEQ: 29.8 INJECTION, SOLUTION INTRAVENOUS at 10:05

## 2023-09-25 RX ADMIN — SODIUM CHLORIDE, POTASSIUM CHLORIDE, SODIUM LACTATE AND CALCIUM CHLORIDE: 600; 310; 30; 20 INJECTION, SOLUTION INTRAVENOUS at 18:30

## 2023-09-25 RX ADMIN — Medication 20 MG: at 17:24

## 2023-09-25 RX ADMIN — SODIUM BICARBONATE 50 MEQ: 84 INJECTION, SOLUTION INTRAVENOUS at 01:11

## 2023-09-25 RX ADMIN — CEFEPIME HYDROCHLORIDE 2 G: 2 INJECTION, POWDER, FOR SOLUTION INTRAVENOUS at 09:23

## 2023-09-25 RX ADMIN — MIDAZOLAM 0.5 MG: 1 INJECTION INTRAMUSCULAR; INTRAVENOUS at 00:20

## 2023-09-25 RX ADMIN — SUGAMMADEX 200 MG: 100 INJECTION, SOLUTION INTRAVENOUS at 19:16

## 2023-09-25 RX ADMIN — EPINEPHRINE 0.5 MG: 0.1 INJECTION INTRACARDIAC; INTRAVENOUS at 01:12

## 2023-09-25 RX ADMIN — SODIUM BICARBONATE 50 MEQ: 84 INJECTION, SOLUTION INTRAVENOUS at 01:21

## 2023-09-25 RX ADMIN — METRONIDAZOLE 500 MG: 5 INJECTION, SOLUTION INTRAVENOUS at 09:02

## 2023-09-25 RX ADMIN — SODIUM CHLORIDE, POTASSIUM CHLORIDE, SODIUM LACTATE AND CALCIUM CHLORIDE: 600; 310; 30; 20 INJECTION, SOLUTION INTRAVENOUS at 11:34

## 2023-09-25 RX ADMIN — INSULIN HUMAN 2 UNITS/HR: 1 INJECTION, SOLUTION INTRAVENOUS at 04:20

## 2023-09-25 RX ADMIN — SODIUM CHLORIDE: 9 INJECTION, SOLUTION INTRAVENOUS at 22:32

## 2023-09-25 RX ADMIN — FENTANYL CITRATE 50 MCG: 50 INJECTION, SOLUTION INTRAMUSCULAR; INTRAVENOUS at 02:04

## 2023-09-25 RX ADMIN — METRONIDAZOLE 500 MG: 5 INJECTION, SOLUTION INTRAVENOUS at 20:04

## 2023-09-25 RX ADMIN — PANTOPRAZOLE SODIUM 40 MG: 40 INJECTION, POWDER, FOR SOLUTION INTRAVENOUS at 07:47

## 2023-09-25 RX ADMIN — EPINEPHRINE 0.03 MCG/KG/MIN: 1 INJECTION INTRAMUSCULAR; INTRAVENOUS; SUBCUTANEOUS at 00:57

## 2023-09-25 RX ADMIN — CEFAZOLIN 2 G: 1 INJECTION, POWDER, FOR SOLUTION INTRAMUSCULAR; INTRAVENOUS at 02:36

## 2023-09-25 RX ADMIN — PHENYLEPHRINE HYDROCHLORIDE 100 MCG: 10 INJECTION INTRAVENOUS at 19:03

## 2023-09-25 RX ADMIN — Medication 25 MCG/HR: at 04:24

## 2023-09-25 RX ADMIN — CALCIUM CHLORIDE INJECTION 1000 MG: 100 INJECTION, SOLUTION INTRAVENOUS at 01:28

## 2023-09-25 RX ADMIN — SODIUM BICARBONATE 50 MEQ: 84 INJECTION, SOLUTION INTRAVENOUS at 01:14

## 2023-09-25 RX ADMIN — FIBRINOGEN (HUMAN) 1050 MG: KIT INTRAVENOUS at 02:27

## 2023-09-25 RX ADMIN — SODIUM CHLORIDE, POTASSIUM CHLORIDE, SODIUM LACTATE AND CALCIUM CHLORIDE: 600; 310; 30; 20 INJECTION, SOLUTION INTRAVENOUS at 16:01

## 2023-09-25 RX ADMIN — ALBUMIN HUMAN 25 G: 0.05 INJECTION, SOLUTION INTRAVENOUS at 15:24

## 2023-09-25 RX ADMIN — MIDAZOLAM 1 MG: 1 INJECTION INTRAMUSCULAR; INTRAVENOUS at 00:10

## 2023-09-25 RX ADMIN — Medication 30 MG: at 01:09

## 2023-09-25 RX ADMIN — MAGNESIUM SULFATE IN WATER 2 G: 40 INJECTION, SOLUTION INTRAVENOUS at 13:20

## 2023-09-25 RX ADMIN — PHENYLEPHRINE HYDROCHLORIDE 100 MCG: 10 INJECTION INTRAVENOUS at 16:59

## 2023-09-25 RX ADMIN — EPINEPHRINE 0.5 MG: 0.1 INJECTION INTRACARDIAC; INTRAVENOUS at 01:47

## 2023-09-25 RX ADMIN — CHLORHEXIDINE GLUCONATE 0.12% ORAL RINSE 15 ML: 1.2 LIQUID ORAL at 07:47

## 2023-09-25 RX ADMIN — POTASSIUM CHLORIDE 20 MEQ: 29.8 INJECTION, SOLUTION INTRAVENOUS at 08:57

## 2023-09-25 RX ADMIN — PROPOFOL 60 MCG/KG/MIN: 10 INJECTION, EMULSION INTRAVENOUS at 17:27

## 2023-09-25 RX ADMIN — CALCIUM CHLORIDE INJECTION 1000 MG: 100 INJECTION, SOLUTION INTRAVENOUS at 02:10

## 2023-09-25 RX ADMIN — PROPOFOL 50 MCG/KG/MIN: 10 INJECTION, EMULSION INTRAVENOUS at 06:10

## 2023-09-25 RX ADMIN — SODIUM CHLORIDE, POTASSIUM CHLORIDE, SODIUM LACTATE AND CALCIUM CHLORIDE 1000 ML: 600; 310; 30; 20 INJECTION, SOLUTION INTRAVENOUS at 13:03

## 2023-09-25 RX ADMIN — EPINEPHRINE 0.5 MG: 0.1 INJECTION INTRACARDIAC; INTRAVENOUS at 01:42

## 2023-09-25 RX ADMIN — MIDAZOLAM 1 MG: 1 INJECTION INTRAMUSCULAR; INTRAVENOUS at 01:03

## 2023-09-25 RX ADMIN — Medication 150 MCG/HR: at 21:51

## 2023-09-25 RX ADMIN — CALCIUM CHLORIDE INJECTION 1000 MG: 100 INJECTION, SOLUTION INTRAVENOUS at 01:48

## 2023-09-25 RX ADMIN — EPINEPHRINE 0.5 MG: 0.1 INJECTION INTRACARDIAC; INTRAVENOUS at 01:22

## 2023-09-25 RX ADMIN — EPINEPHRINE 0.5 MG: 0.1 INJECTION INTRACARDIAC; INTRAVENOUS at 01:53

## 2023-09-25 RX ADMIN — FENTANYL CITRATE 50 MCG: 50 INJECTION, SOLUTION INTRAMUSCULAR; INTRAVENOUS at 02:01

## 2023-09-25 RX ADMIN — PROPOFOL 60 MCG/KG/MIN: 10 INJECTION, EMULSION INTRAVENOUS at 21:09

## 2023-09-25 RX ADMIN — HUMAN ALBUMIN MICROSPHERES AND PERFLUTREN 5 ML: 10; .22 INJECTION, SOLUTION INTRAVENOUS at 09:06

## 2023-09-25 RX ADMIN — DEXTROSE MONOHYDRATE 25 ML: 25 INJECTION, SOLUTION INTRAVENOUS at 23:30

## 2023-09-25 RX ADMIN — CALCIUM CHLORIDE INJECTION 1000 MG: 100 INJECTION, SOLUTION INTRAVENOUS at 01:00

## 2023-09-25 RX ADMIN — PHENYLEPHRINE HYDROCHLORIDE 200 MCG: 10 INJECTION INTRAVENOUS at 16:11

## 2023-09-25 RX ADMIN — PHENYLEPHRINE HYDROCHLORIDE 100 MCG: 10 INJECTION INTRAVENOUS at 17:16

## 2023-09-25 RX ADMIN — PROPOFOL 60 MCG/KG/MIN: 10 INJECTION, EMULSION INTRAVENOUS at 10:03

## 2023-09-25 RX ADMIN — PROPOFOL 60 MCG/KG/MIN: 10 INJECTION, EMULSION INTRAVENOUS at 13:35

## 2023-09-25 RX ADMIN — POTASSIUM CHLORIDE 20 MEQ: 29.8 INJECTION, SOLUTION INTRAVENOUS at 07:47

## 2023-09-25 RX ADMIN — CHLORHEXIDINE GLUCONATE 0.12% ORAL RINSE 15 ML: 1.2 LIQUID ORAL at 20:04

## 2023-09-25 RX ADMIN — Medication 20 MG: at 00:08

## 2023-09-25 RX ADMIN — MIDAZOLAM 1 MG: 1 INJECTION INTRAMUSCULAR; INTRAVENOUS at 01:11

## 2023-09-25 RX ADMIN — Medication 20 MG: at 02:36

## 2023-09-25 ASSESSMENT — ACTIVITIES OF DAILY LIVING (ADL)
ADLS_ACUITY_SCORE: 45
ADLS_ACUITY_SCORE: 35
ADLS_ACUITY_SCORE: 35
ADLS_ACUITY_SCORE: 47
ADLS_ACUITY_SCORE: 35
ADLS_ACUITY_SCORE: 47
ADLS_ACUITY_SCORE: 35
ADLS_ACUITY_SCORE: 47

## 2023-09-25 ASSESSMENT — LIFESTYLE VARIABLES
TOBACCO_USE: 1
TOBACCO_USE: 1

## 2023-09-25 NOTE — ED TRIAGE NOTES
Triage Assessment       Row Name 09/24/23 1541       Triage Assessment (Adult)    Airway WDL WDL       Respiratory WDL    Respiratory WDL WDL       Skin Circulation/Temperature WDL    Skin Circulation/Temperature WDL WDL       Cardiac WDL    Cardiac WDL WDL       Peripheral/Neurovascular WDL    Peripheral Neurovascular WDL WDL       Cognitive/Neuro/Behavioral WDL    Cognitive/Neuro/Behavioral WDL WDL                  Transfer from [facility] for reported ruptured AAA . Presently on Esmolol at 150 mcg /kg/min , presently alert and oriented x 4,

## 2023-09-25 NOTE — PLAN OF CARE
Neuro: Sedated; RASS -5. Pupils pinpoint and sluggish w/ scleral edema. Pt blind at baseline.   CV: Sinus-sinus tach. Tmax= 100.9. MAP goal >65 and SBP goal 100-140 met on levo + 2 one L boluses of LR and 500 mL albumin. Pulses nonpalpable on LLE; team aware.   Resp: CMV settings 50%/500/18 PEEP=8. Scant secretions.   GI/: NG to LIS w/ moderate amount of yellow/green, thin output. Lu in place- oliguric. Multiple loose, mucous + blood tinged stools. Teams aware. Tap water enema given prior to bedside flex sigmoidoscopy.   Skin: Scattered bruising. Open abdomen covered with wound vac w/ sang output. L leg mottled/cold and no pulses. Previous R groin site covered w/ pressure dressing.   Access: PIV x1. R radial arterial line. R DL IJ w/ introducer.   Gtts: Prop @ 60, fent @ 150, LR @ 150.     Plan: Patient down to OR at 1600. Did not return on this shift.

## 2023-09-25 NOTE — OR NURSING
Removed wound vac dressing in OR today.  Black sponges x 4, two blue towels wrapped in Ioban, and six quik clot sponges removed.  This matched previous documentation of what was originally packed in abdomen.  Three small quik clots packed in abdomen today.  Abethera dressing placed. LDAs updated.

## 2023-09-25 NOTE — OP NOTE
"    VASCULAR SURGERY OPERATIVE REPORT     LOCATION:    [facility]    Alfredo Burnham  Medical Record #:  [id number]  YOB: 1955  Age:  68 year old     Date of Service: 09/24/23     Preoperative diagnosis    Ruptured Pararenal Abdominal Aortic Aneurysm    Postoperative diagnosis    Ruptured Pararenal Abdominal Aortic Aneurysm    Surgeon: Boris Lowe MD  First Assistant: Jonathan Fry MD  Assistant: Zonia Florentino MD (PGY6 vascular surgery fellow)  A first assistant actively participated and was necessary for one or more of the following: opening, exposure and visualization during the case, maintaining hemostasis, wound closure resulting in its safe and expeditious completion.       Procedures:  Resection of ruptured pararenal abdominal aortic and iliac aneurysms  Replacement with [id number] bypass utilizing a 20x10 bifurcated Hemagard Dacron graft  Endovascular balloon occlusion of the supraceliac aorta  Placement of the catheter and 14 Fr sheath into the aorta  Realtime, ultrasound guided right common femoral artery access  Interpretation of radiologic findings  Placement of Perclose device x2  Temporary abdominal closure    Findings:   Ruptured pararenal aortoiliac aneurysm with high \"shaggy\" aorta.  Profound coagulopathy and shock requiring temporary abdominal closure and necessitating abandoning left lower extremity thromboembolectomy despite poor perfusion.    Indication for procedure:    Mr. Burnham is a 68 year old man who presented to his local emergency department with abdominal pain.  CT demonstrated a 7 cm pararenal aortoiliac aneurysm with stranding and concern for impending rupture vs contained rupture.  He was transferred emergently.  Risks, benefits, alternatives and indications including but not limited to the risk of death, anesthetic or cardiopulmonary complications, bleeding, infection, myocardial infarction, stroke, renal " insufficiency requiring temporary or permanent dialysis, bowel infarction necessitating bowel resection and colostomy, vessel injury, dissection, distal embolization, perforation, worsening ischemia with either compartment syndrome or limb loss, and spinal cord injury.  Discussed the likely need for bank blood products. The patient and his family understand the dire situation and risks and would like to proceed with emergency aneurysm repair and indicated cares.       Description of procedure:    Operative details:    The patient was brought to the hybrid operating room.  Under satisfactory local anesthesia, he was placed in supine position with all pressure points padded in standard fashion.  The lower chest, abdomen, and thighs were widely prepped and draped in sterile, standard fashion.  A surgical pause was performed with all members of the surgical team to verify patient, medical record number, birth date, planned surgical procedure, surgical site, allergies, fire risk and perioperative antibiotics.    After placement of lines by our anesthesia colleagues, percutaneous ultrasound guided retrograde right transfemoral access was obtained with a micropuncture kit and the system was up-sized to an 0.035 system.  After dilation with a 6 Fr sheath, two perclose devices were placed in pre close fashion.  An 8 Fr sheath was advanced into the aneurysm.  With the assistance of a kumpe catheter and glidewire, the descending thoracic aorta was cannulated. The wire was exchanged for a Lunderquist and the sheath was exchanged for a 14x33 Dryseal sheath.  A Reliant balloon was advanced into the supraceliac aorta but not inflated. Through this time, the patient remained stable.  The patient was then placed under general anesthesia and intubated.  He started to become unstable at this point and the supraceliac balloon was inflated with improvement of blood pressure for 60 to 130.  The abdomen was rapidly opened through a  xiphoid pubic incision.  Abdominal exploration of the peritoneal cavity was unremarkable with no signs of intraperitoneal rupture.  The retroperitoneal tissues were opened vertically proceeding through dense inflammation.  The duodenum was adhered to the aneurysm sac requiring cautious dissection.   The left renal vein was divided central to the adrenal vein, preserving this, the gonadal and lumbar vein branches.  Each renal artery was dissected free, with the left renal coming off slightly abnormal tissue.  The crura of the diaphragm were divided along the suprarenal aorta to  expose the pararenal aorta.  A padded aortic clamp was placed in the inter-renal location.  The patient was systemically heparinized.  The left renal artery was occluded with silastic loop and the clamp was applied to the aorta after release of the occlusion balloon which had been in place for 30 minutes.   The iliac artery bifurcations were isolated and cross clamped just proximal to the bifurcations. The two perclose devices were tightened in the groin.  The aneurysm was entered, thrombus evacuated, and lumbar bleeders oversewn. This revealed the ruptured segment in the right proximal aneurysm just below the juxtrenal aorta. The juxtarenal aorta was transected and while it was a pararenal aneurysm there was a ring of tissue to which we could sew that could maintain both renal arteries although the left was nearly occluded preoperatively. A 20x10 graft was fashioned and fitted and sewn end-to-end to the aorta below both renals with running 4-0 Prolene suture.  The suture line was buttressed with felt strips.  The anastomosis was tested, flushed, and was hemostatic after a repair stitch.  Blood flow was restored to the renal artery.  The distal right common iliac artery was transected.  The right limb of the graft was cut to length, spatulated, and sewn end-to-end to the iliac artery with running 4-0 Prolene suture.  Backbleeding and  forebleeding were allowed and the anastomosis completed.  The patient tolerated the declamping well and had an excellent femoral artery pulse.  A similar procedure was carried out for the left limb of the graft.  During this time, multiple repair stitches were required on both iliac limbs given the poor tissue quality and calcification of the iliac arteries at this level.  He was also noted to be coagulopathic with diffuse oozing from each needle hole and all of the retroperitoneal soft tissue.  The retroperitoneum was packed while our anesthesia team worked to correct the coagulopathy.  He had be intermittently unstable requiring multiple vasopressors throughout the entirety of the aortic repair.  The patient had bilateral femoral pulses. Decision was made by myself and Dr. Fry to conclude the procedure and leave the abdomen packed.  The retroperitoneum was repacked with thrombin soaked gelfoam and 6 intentionally packed quikclots.  The small and large intestines appeared well perfused.  A temporary abdominal closure was fashioned from two blue towels wrapped in ioban.  Holes were created with a knife.  This was placed over the the bowel. A single black wound vac sponge was place and sterile tape applied.  A pressure dressing was placed over the percutaneous groin access site.    The patient had strong signals in the right foot at completion, however, no signal was found in the left leg below the popliteal artery.  He remained unstable with intermittent blood pressures in the 60s and was exhibiting laboratory and clinical coagulopathy.  Given this, Dr. Fry and I together decided to proceed to the ICU for resuscitation rather than persist with lower extremity thromboembolectomy and risk his life to restore limb perfusion.      He was transferred to the ICU in critical condition.  The family was updated and understand the critical nature of the patient's situation.    Estimated blood loss: 8850 ml     Specimens:  none     Complications: Left lower extremity ischemia    Plan:  -Aggressive resuscitation, no diuresis  -Normalize coags  --140  -Flat for 6 hours for percutaneous femoral access  -Neurovascular checks, warm left foot          Boris Lowe MD, VI  VASCULAR AND ENDOVASCULAR SURGERY   [facility] Vascular Surgery

## 2023-09-25 NOTE — PROGRESS NOTES
SURGICAL ICU PROGRESS NOTE  09/25/2023        Date of Service (when I saw the patient): 09/25/2023    ASSESSMENT:  Alfredo Burnham is a 68 year old male who was admitted to the SICU on 9/24/2023 s/p open AAA repair. Patient has a history of HTN and previous TIA. He presented to the ED earlier today with right sided abdominal pain and was found to have a contained, ruptured AAA on CT. 30 min super-celiac clamp time. Prolonged intra- renal clamp. On arrival to SICU, patient remains intubated with an open abdomen, receiving MTP.      Intra-operative resuscitation   - 7 units PRBC  - 7 units FFP  - 3 units cryo  - 4 units platelets  - 3.5L cellsaver  - 3.5L crystalloid   - 1g fibryga    CHANGES and MAJOR THINGS TODAY:   - RTOR for packing change  - Will discuss flex sig with GI d/t bloody bowel movement this AM  - Starting Cefepime and flagyl  - Continue volume repletion      PLAN:    Neurological:  # Acute pain   # Agitation   # Encephalopathy   - Monitor neurological status. Delirium preventions and precautions  - Pain: fentanyl infusion                 - Sedation plan: propofol infusion      Pulmonary:   # Post operative ventilatory support  # Acute hypoxic respiratory support   - VENT: AC-VC 50% FiO2, tidal volume 560, RR 25, PEEP 8. Continue full vent support. PST when meets criteria.  Ventilatory bundle. HOB elevation   - Supplemental oxygen to keep saturation above 92%.     Cardiovascular:    # Shock-hypovolemic  # H/o HTN  # Contained AAA rupture s/p open repair  # Suspected emboli to LLE  - Monitor hemodynamic status.   - Goal MAP >65, ideally -140  - Wean pressors as able - currently off pressors. If requiring pressors again, will start with Levo  - RLE wrapped in bear hugger with sheepskin boot in place     Gastroenterology/Nutrition:  # Open abdomen  # Protein calorie deficit malnutrition, risk of  # Post-operative melena  - NPO  - No indication for parenteral nutrition.  - PPI for ppx  - GI  consulted for flex sig  - Starting Zosyn for ppx of bacterial translocation     Fluids/Electrolytes:   - LR 150ml/hr for IV fluid hydration/resuscitation      Renal:  # Oliguria  - <30 ml/hr since return from OR  - No indication for CRRT at this time  - Will watch for evidence of ATN     Endocrine:  # Stress hyperglycemia, risk of  - Insulin infusion. Goal to keep BG< 180 for optimal wound healing      ID:  # filemon-operative prophylaxis   # bacterial translocation in setting of possibly ischemic bowel  - Cefepime/Flagyl for ppx for bacterial translocation     Heme:     # Acute blood loss anemia  # Active resuscitation  - Goal hgb >8, fibrinogen >200, INR <1.5, plts >100   - Will continue active, balanced resuscitation. Has received 2 units PRBC, 2 FFP, and 1 cryo since OR.  - Will trend coags and CBC q2hr in initial resuscitation period     MSK:  # Weakness and deconditioning of critical illness, risk of  - Physical and occupational therapy consult when appropriate. Passive ROM at bedside with RN  - Flat bedrest, do not break bed     General Cares/Prophylaxis:    DVT Prophylaxis: Pneumatic Compression Devices on RLE  GI Prophylaxis: PPI  Restraints: Restraints for medical healing needed: YES     Lines/ tubes/ drains:  - ETT  - Lu  - Abthera  - PIV x3  - R IJ  - R radial arterial line     RLE and right groin reinforced w/ strong tape     Disposition:  -  Surgical ICU    Patient seen, findings and plan discussed with surgical ICU staff, Dr. Escobedo.      Lucas Niño MD    ====================================  INTERVAL HISTORY:   9/24: Open AAA repair with supraceliac clamping, abdomen left open  9/25: Takeback to OR for repacking    ROS unable to be performed due to sedation and intubation.    OBJECTIVE:   1. VITAL SIGNS:   Temp:  [95  F (35  C)-97.8  F (36.6  C)] 95.9  F (35.5  C)  Pulse:  [] 101  Resp:  [10-25] 22  BP: ()/() 116/85  MAP:  [43 mmHg-110 mmHg] 110 mmHg  Arterial Line BP:  ()/(67-92) 144/92  FiO2 (%):  [50 %] 50 %  SpO2:  [94 %-100 %] 100 %  Vent Mode: CMV/AC  (Continuous Mandatory Ventilation/ Assist Control)  FiO2 (%): 50 %  Resp Rate (Set): (S) 22 breaths/min  Tidal Volume (Set, mL): 560 mL  PEEP (cm H2O): 8 cmH2O  Resp: 22      2. INTAKE/ OUTPUT:   No intake/output data recorded.    3. PHYSICAL EXAMINATION:  General: Intubated, sedated  HEENT: Normocephalic, atraumatic. NGT to LIS, ETT  Neuro: Sedated  Pulm/Resp: Intubated  CV: RRR  Abdomen: Open abdomen with Abthera in place, serosanguinous output, soft, moderately distended  :  aparicio catheter in place, urine yellow and clear  MSK/Extremities: LLE popliteal biphasic signal, no pedal signals, mottled, cold. RLE biphasic DP and PT    4. INVESTIGATIONS:   Arterial Blood Gases   Recent Labs   Lab 09/25/23  0344   PH 7.36   PCO2 28*   PO2 162*   HCO3 16*     Complete Blood Count   Recent Labs   Lab 09/25/23  0344 09/25/23  0145 09/24/23  1644   WBC 4.9 5.6 8.5   HGB 11.4* 9.3* 15.6   * 65* 96*     Basic Metabolic Panel  Recent Labs   Lab 09/25/23  0608 09/25/23  0457 09/25/23  0344 09/24/23  1706 09/24/23  1644   NA  --   --  139  --  134*   POTASSIUM  --   --  4.3  --  4.6   CHLORIDE  --   --  101  --  98   CO2  --   --  14*  --  21*   BUN  --   --  14.1  --  14.4   CR  --   --  1.08 1.1 1.00   * 138* 140*  --  152*     Liver Function Tests  Recent Labs   Lab 09/25/23  0604 09/25/23  0344 09/25/23  0145 09/24/23  1644   AST  --  318*  --  34   ALT  --  199*  --  27   ALKPHOS  --  42  --  76   BILITOTAL  --  0.5  --  0.8   ALBUMIN  --  2.3*  --  4.6   INR 1.35*  --  1.71*  --      Pancreatic Enzymes  Recent Labs   Lab 09/24/23  1644   LIPASE 21     Coagulation Profile  Recent Labs   Lab 09/25/23  0604 09/25/23  0145   INR 1.35* 1.71*   PTT 34 136*         5. RADIOLOGY:   Recent Results (from the past 24 hour(s))   CTA  ABDOMEN PELVIS BILAT LEG RUNOFF W CONTRAST   Result Value    Radiologist flags Active arterial  bleeding or acute aortic pathology (AA)    Narrative    EXAM: CTA ABDOMEN PELVIS BILAT LEG RUNOFF W CONTR  LOCATION: [facility]  DATE: 9/24/2023    INDICATION: severe R sided abdominal pelvic pain radiating into leg, severe htn, please eval for aortic pathology or other acute intraabdominal process  COMPARISON: None.  TECHNIQUE: Helical acquisition through the abdomen, pelvis, and bilateral lower extremities was performed during the arterial phase of contrast enhancement using IV Contrast. 2D and 3D reconstructions were performed by the CT technologist. Dose reduction   techniques were used.   CONTRAST: 100mL isovue 370    FINDINGS:  AORTA: Moderate mixed attenuation atheroma in the descending thoracic aorta. There is a large infrarenal abdominal aortic aneurysm. The aneurysm sac contains near circumferential thrombus with patent central channel. Along the right posterolateral   aneurysm wall there is a focal outpouching which is high attenuation on the precontrast series associated with surrounding inflammatory stranding consistent with contained rupture (series 6, image 26). At this level in the axial plane the aortic aneurysm   measures 6.4 x 8.4 cm (series 9, image 467). Edema/blood products extends around the IVC, renal veins and right renal artery. No active contrast extravasation. The aneurysm is contiguous with the left common iliac artery which measures up to 3.1 cm in   diameter proximally. There is also ectasia of the right common iliac artery. Moderate patchy diffuse patchy mixed attenuation atheroma in the external and internal iliac arteries. The proximal right internal iliac artery is ectatic measuring up to 1.6   cm.    RIGHT LEG: Moderate mixed attenuation atheroma the right common femoral artery without critical stenosis. The superficial femoral artery has patchy atheromatous plaque but no narrowing. The deep femoral artery is widely patent. The right  popliteal artery   has moderate plaque but no high-grade stenosis. Patchy atheromatous calcifications of the tibioperoneal trunk and proximal trifurcation vessels however there is two-vessel runoff to the right ankle.    There are superficial varicosities in the left calf particularly medially which drain to a piece symmetrically dilated greater saphenous vein.    LEFT LEG: The left common femoral artery is widely patent with mild circumferential wall calcification. The deep femoral artery is ectatic proximally measuring up to 1 cm. The superficial femoral artery has patchy moderate atheromatous plaque but no   high-grade stenosis. The left popliteal artery is patent. Mild atheromatous calcifications of the tibioperoneal trunk. 3 vessel runoff to the left ankle.    LUNG BASES: No actionable nodules, lung edema or airspace opacities. No pleural fluid.    ABDOMEN: Hepatic steatosis. Normal gallbladder and bile ducts. Pancreas, adrenal glands, and spleen are normal. Kidneys are normal in size. Benign cortical renal cyst arising from the right kidney does not require specific workup or follow-up. No   hydronephrosis or nephrolithiasis. No bowel wall thickening or inflammation. Sigmoid diverticulosis. No intraperitoneal free air or free fluid.    PELVIS: Prostate gland is heterogeneous but not enlarged. Seminal vesicles are symmetric. No pelvic free fluid.      Impression    IMPRESSION:    1.  Infrarenal abdominal aortic aneurysm contiguous with the left common iliac artery measuring up to 6.4 x 8.4 cm. There is a focal ectasia in the right posterolateral wall with edema/blood products around the aneurysm in the retroperitoneum consistent   with contained rupture. Vascular surgery consult is suggested.  2.  Moderate patchy atherosclerosis of the external iliac arteries and arteries of both lower extremities but no high-grade stenosis. 2 vessel runoff to the right ankle and 3 vessel runoff to the left ankle.  3.  Hepatic  steatosis.  4.  Diverticulosis but no diverticulitis.      [Critical Result: Active arterial bleeding or acute aortic pathology]    Finding was identified on 9/24/2023 6:32 PM CDT.     Dr. De La Rosa was contacted by me on 9/24/2023 6:30 PM CDT and verbalized understanding of the critical result.    CT External Imaging Abdomen    Narrative    Images were obtained from an external facility.  Click PACS Images   hyperlink to view images.  Textual results have been scanned into the   media tab.   ADDIS with Report    Narrative    Mason Foss MD     9/25/2023  4:21 AM  Perioperative ADDIS Procedure Note    Staff -        Anesthesiologist:  Mason Foss MD       Performed By: anesthesiologist  Preanesthesia Checklist:  Patient identified, IV assessed, risks and   benefits discussed, monitors and equipment assessed, procedure being   performed at surgeon's request and anesthesia consent obtained.    ADDIS Probe Insertion  Probe Number: Loaner 1  Probe Status PRE Insertion: NO obvious damage  Probe type:  Adult 3D  Bite block used:   Oral Airway  Insertion Technique: Recurrent attempts, Laryngoscopy  Insertion complications: None obvious  Billing Report:A ADDIS report is NOT being generated.  Probe Status POST Removal: NO obvious damage         XR Chest Port 1 View    Impression    RESIDENT PRELIMINARY INTERPRETATION  IMPRESSION:  1. Endotracheal tube tip is in the mid thoracic trachea.  2. No acute airspace disease.   XR Abdomen Port 1 View    Impression    RESIDENT PRELIMINARY INTERPRETATION  Impression:   1. Gastric tube tip and sidehole project over the stomach. Packing  material present in the abdomen.  2. Nonobstructive bowel gas pattern.        =========================================

## 2023-09-25 NOTE — PROGRESS NOTES
Admitted/transferred from: OR   Reason for admission/transfer: ICU level of care  2 RN skin assessment: completed by Chacha TORRES RN and Danilo VELEZ RN  Result of skin assessment and interventions/actions: Abd surgical incision with wound vac, unable to check back d/t patient condition.  Height, weight, drug calc weight: Done  Patient belongings (see Flowsheet)  MDRO education added to care planYes  ?

## 2023-09-25 NOTE — OP NOTE
[facility] Operative Report  September 25, 2023    Preoperative Dx:  Open abdomen  Ruptured AAA s/p aortobiliary bypass  Shock requiring pressor support  Colonic ischemia    Postoperative Dx:  Open abdomen  Ruptured AAA s/p aortobiliary bypass  Shock requiring pressor support  Colonic ischemia    Procedure:  Exploratory laparotomy    Surgeon: Ash Boucher MD  Assistant Surgeon: Roger Shirley MD      Please see Dr. Boucher's complete operative report for full details. In brief, Mr. Burnham is a 69 yo M who presented yesterday with a ruptured aortic aneurysm, and was taken emergently to the operating room for aortobiliary bypass. There was concern for colonic ischemia, and endoscopic evaluation performed by Dr. Walker was concerning for at least mucosal ischemia at the level of the superior rectum and proximal along the OTTO distribution. Given his open abdomen and concerns for colonic ischemia, operative evaluation was recommended.     Operative Details: The patient was brought to the operating room intubated. He was placed in the lithotomy position with extremities carefully padded. The abdomen was prepped and draped in the usual sterile fashion. The temporary dressing was removed, and the abdomen was thoroughly evaluated. The aortic graft was evaluated, and was hemostatic. Please see Dr. Boucher's operative details. The rectum, colon and small bowel were thoroughly evaluated with no evidence of any transmural ischemia. Notably, the upper rectum and sigmoid colon had no evidence of transmural ischemia, and were pink and completely healthy in appearance. The entirety of the small bowel was run as well, and was pink and healthy. Thus, I decided that no bowel resection was acutely indicated at this time. The patient will likely return to the operating room in the next 1-2 days, and the bowel will be evaluated by the vascular team. Should there be any concerns for ischemia at that time, I would be  happy to assist with evaluation, possible resection.    Findings: healthy and pink intestine, without any evidence of ischemia requiring resection.    Roger Shirley MD  Division of Colon and Rectal Surgery  [facility]  [phone]

## 2023-09-25 NOTE — PROGRESS NOTES
Major Shift Events:  Admitted from OR around 0350. Heavily sedated with prop and fent. Pinpoint pupils. Sinus tachycardia with rate 100-110. Systolic BP goal between 120-140, MAP goal greater than 65. Pressers off since around 0430. 3 units PRBC, 3 units FFP, and 1 unit cyro given. Wound vac on open abd surgical incision, 100-150 of sanguinous fluid out per hour. Intubated, CMV settings, 60%/22/560/8. NG in place to -150 out per hour. 1 large red tinged/mucus BM. LLE dusky, no pedial pulse, team aware.   Plan: Return to OR in afternoon.  For vital signs and complete assessments, please see documentation flowsheets.

## 2023-09-25 NOTE — ANESTHESIA PROCEDURE NOTES
Central Line/PA Catheter Placement    Pre-Procedure   Staff -        Anesthesiologist:  Mason Foss MD       Resident/Fellow: Jeanie Price MD       Performed By: anesthesiologist and resident       Location: OR       Pre-Anesthestic Checklist: patient identified, IV checked, site marked, risks and benefits discussed, informed consent, monitors and equipment checked, pre-op evaluation and at physician/surgeon's request  Timeout:       Correct Patient: Yes        Correct Procedure: Yes        Correct Site: Yes        Correct Position: Yes        Correct Laterality: Yes   Line Placement:   This line was placed Pre Induction starting at 9/24/2023 9:45 PM and ending at 9/24/2023 10:07 PM    Procedure   Procedure: central line and emergent       Laterality: right       Insertion Site: internal jugular.       Patient Position: supine  Sterile Prep        All elements of maximal sterile barrier technique followed       Patient Prep/Sterile Barriers: draped, hand hygiene, gloves , hat , mask , draped, gown, sterile gel and probe cover  Insertion/Injection        Local skin infiltrated with mL of 1% lidocaine.        Technique: ultrasound guided        1. Ultrasound was used to evaluate the access site.       2. Vein evaluated via ultrasound for patency/adequacy.       3. Using real-time ultrasound the needle/catheter was observed entering the artery/vein.       Introducer Type: 9 Fr, 2-lumen MAC        Type: PA/CVC with Introducer       Number of Lumens: double lumen  Narrative         Secured by: suture       Complications: None apparent,        blood aspirated from all lumens,        All lumens flushed: Yes       Verification method: Ultrasound and Placement to be verified post-op   Comments:  Hands off triple lumen catheter placed into introducer port at 15 cm. All ports aspirated and flushed.

## 2023-09-25 NOTE — ANESTHESIA PREPROCEDURE EVALUATION
Anesthesia Pre-Procedure Evaluation    Patient: Alfredo Burnham   MRN: [id number] : 1955        Procedure : Procedure(s):  Incision and drainage abdomen washout, combined, wound vac placement, abthera          Past Medical History:   Diagnosis Date    Hypertension 2011    Macular degeneration       No past surgical history on file.   Allergies   Allergen Reactions    Penicillins Swelling     Facial swelling      Social History     Tobacco Use    Smoking status: Every Day     Packs/day: 0.50     Types: Cigarettes    Smokeless tobacco: Not on file   Substance Use Topics    Alcohol use: Yes     Comment: 1-2/wk      Wt Readings from Last 1 Encounters:   23 79.4 kg (175 lb 0.7 oz)        Anesthesia Evaluation   Pt has not had prior anesthetic         ROS/MED HX  ENT/Pulmonary: Comment: The patient is currently on mechanical ventilation.    (+)     RIGO risk factors,  hypertension,         tobacco use, Current use,                      Neurologic:     (+)              TIA,                  Cardiovascular:     (+)  hypertension- -   -  - -                                      METS/Exercise Tolerance:     Hematologic: Comments: Thrombocytopenia      Musculoskeletal:       GI/Hepatic:     (+) GERD,                   Renal/Genitourinary:  - neg Renal ROS  (-) renal disease   Endo:  - neg endo ROS  (-) Type II DM   Psychiatric/Substance Use:     (+)   alcohol abuse      Infectious Disease:  - neg infectious disease ROS     Malignancy:       Other:            Physical Exam    Airway  airway exam normal      Mallampati: I   TM distance: > 3 FB   Neck ROM: full   Mouth opening: > 3 cm    Respiratory Devices and Support         Dental       (+) Minor Abnormalities - some fillings, tiny chips      Cardiovascular   cardiovascular exam normal       Rhythm and rate: regular and normal     Pulmonary   pulmonary exam normal        breath sounds clear to auscultation           OUTSIDE LABS:  CBC:   Lab Results    Component Value Date    WBC 4.5 09/25/2023    WBC 4.8 09/25/2023    HGB 14.3 09/25/2023    HGB 13.9 09/25/2023    HCT 39.3 (L) 09/25/2023    HCT 38.3 (L) 09/25/2023     (L) 09/25/2023     (L) 09/25/2023     BMP:   Lab Results   Component Value Date     09/25/2023     (L) 09/24/2023    POTASSIUM 5.5 (H) 09/25/2023    POTASSIUM 2.5 (LL) 09/25/2023    CHLORIDE 101 09/25/2023    CHLORIDE 98 09/24/2023    CO2 14 (L) 09/25/2023    CO2 21 (L) 09/24/2023    BUN 14.1 09/25/2023    BUN 14.4 09/24/2023    CR 1.08 09/25/2023    CR 1.1 09/24/2023     (H) 09/25/2023    GLC 92 09/25/2023     COAGS:   Lab Results   Component Value Date    PTT 35 09/25/2023    INR 1.42 (H) 09/25/2023    FIBR 235 09/25/2023     POC: No results found for: BGM, HCG, HCGS  HEPATIC:   Lab Results   Component Value Date    ALBUMIN 2.3 (L) 09/25/2023    PROTTOTAL 3.8 (L) 09/25/2023     (H) 09/25/2023     (H) 09/25/2023    ALKPHOS 42 09/25/2023    BILITOTAL 0.5 09/25/2023     OTHER:   Lab Results   Component Value Date    PH 7.48 (H) 09/25/2023    LACT 2.6 (H) 09/25/2023    A1C 5.7 (H) 09/25/2023    OMAR 11.2 (H) 09/25/2023    PHOS 2.7 09/25/2023    MAG 1.8 09/25/2023    LIPASE 21 09/24/2023    TSH 2.978 02/24/2009       Anesthesia Plan    ASA Status:  5       Anesthesia Type: General.     - Airway: ETT   Induction: Intravenous, Propofol.   Maintenance: Balanced.   Techniques and Equipment:     - Lines/Monitors: Arterial Line, Central Line, BIS (Continue PIV, A-line, and Central line)     - Blood: PRBC, PLT, FFP, Cryo     - Drips/Meds: Norepi, Vasopressin, Phenylephrine (Continue Drips, Phenyleprine drip if necessary)     Consents    Anesthesia Plan(s) and associated risks, benefits, and realistic alternatives discussed. Questions answered and patient/representative(s) expressed understanding.     - Discussed: Risks, Benefits and Alternatives for BOTH SEDATION and the PROCEDURE were discussed     - Discussed  with:  Patient      - Extended Intubation/Ventilatory Support Discussed: Yes.      - Patient is DNR/DNI Status: No     Use of blood products discussed: Yes.     - Discussed with: Patient.     Postoperative Care    Pain management: IV analgesics, Oral pain medications, Multi-modal analgesia.   PONV prophylaxis: Ondansetron (or other 5HT-3), Dexamethasone or Solumedrol, Background Propofol Infusion     Comments:    Other Comments: The material risks benefits, and alternative were discussed in detail.  The patient agrees to proceed.  The patient has no other complaints at this time.            Leslie Mathew MD

## 2023-09-25 NOTE — PROVIDER NOTIFICATION
"Vascular surgery pg'd at 0930:     \"Hi! JN's SBP in 4505 is in the 150s and we don't have any PRNs ordered. Would you like me to give anything? Thx! [place] [id number]\"    SICU came to bedside and stated SBP goal is >100 but <180. Awaiting updated orders at this time.   "

## 2023-09-25 NOTE — ED TRIAGE NOTES
Triage Assessment       Row Name 09/24/23 1951       Triage Assessment (Adult)    Airway WDL WDL       Respiratory WDL    Respiratory WDL WDL       Skin Circulation/Temperature WDL    Skin Circulation/Temperature WDL WDL       Cardiac WDL    Cardiac WDL WDL       Peripheral/Neurovascular WDL    Peripheral Neurovascular WDL WDL       Cognitive/Neuro/Behavioral WDL    Cognitive/Neuro/Behavioral WDL WDL       [place] Coma Scale    Best Eye Response 4-->(E4) spontaneous    Best Motor Response 6-->(M6) obeys commands    Best Verbal Response 5-->(V5) oriented    [place] Coma Scale Score 15      Row Name 09/24/23 1541       Triage Assessment (Adult)    Airway WDL WDL       Respiratory WDL    Respiratory WDL WDL       Skin Circulation/Temperature WDL    Skin Circulation/Temperature WDL WDL       Cardiac WDL    Cardiac WDL WDL       Peripheral/Neurovascular WDL    Peripheral Neurovascular WDL WDL       Cognitive/Neuro/Behavioral WDL    Cognitive/Neuro/Behavioral WDL WDL

## 2023-09-25 NOTE — CONSULTS
SURGICAL ICU ADMISSION NOTE  09/25/2023      PRIMARY TEAM: Vascular surgery  PRIMARY PHYSICIAN: Dr. Lowe  REASON FOR CRITICAL CARE ADMISSION: ventilator management, further resuscitation, hemodynamic monitoring   ADMITTING PHYSICIAN: Dr. Farris  Date of Service (when I saw the patient): 09/25/2023    ASSESSMENT:  Alfredo Burnham is a 68 year old male who was admitted to the SICU on 9/24/2023 s/p open AAA repair. Patient has a history of HTN and previous TIA. He presented to the ED earlier today with right sided abdominal pain and was found to have a contained, ruptured AAA on CT. 30 min super-celiac clamp time. Prolonged intra- renal clamp. On arrival to SICU, patient remains intubated with an open abdomen, receiving MTP.     Intra-operative resuscitation   - 7 units PRBC  - 7 units FFP  - 3 units cryo  - 4 units platelets  - 3.5L cellsaver  - 3.5L crystalloid   - 1g fibryga    PLAN:    Neurological:  # Acute pain   # Agitation   # Encephalopathy   - Monitor neurological status. Delirium preventions and precautions  - Pain: fentanyl infusion     - Sedation plan: propofol infusion     Pulmonary:   # Post operative ventilatory support  # Acute hypoxic respiratory support   - VENT: AC-VC 50% FiO2, tidal volume 560, RR 25, PEEP 8. Continue full vent support. PST when meets criteria.  Ventilatory bundle. HOB elevation   - Supplemental oxygen to keep saturation above 92%.    Cardiovascular:    # Shock-distributive   # H/o HTN  # Contained AAA rupture s/p open repair  # Suspected emboli to LLE  - Monitor hemodynamic status.   - Goal MAP >65, ideally -140  - Vasopressors with norepinephrine and vasopressin   - RLE wrapped in bear hugger with sheepskin boot in place    Gastroenterology/Nutrition:  # Open abdomen  # Protein calorie deficit malnutrition, risk of  - NPO  - No indication for parenteral nutrition.  - PPI for ppx  - Senna-docusate and miralax scheduled    Fluids/Electrolytes:   - LR 150ml/hr  for IV fluid hydration/resuscitation     Renal:  # Oliguria  - Urine output is clear and yellow, less than goal . Will continue to monitor intake and output.    Endocrine:  # Stress hyperglycemia, risk of  - Insulin infusion. Goal to keep BG< 180 for optimal wound healing     ID:  # filemon-operative prophylaxis   -  cefazolin 1g x 2 doses perioperative prophylaxis    Heme:     # Acute blood loss anemia  # Active resuscitation  - Goal hgb >8, fibrinogen >200, INR <1.5, plts >100   - Will continue active, balanced resuscitation. Give 2 units PRBC, 2 FFP, and 1 cryo now.  - Obtain TEG now. Will re-activate MTP. Plan to trend coags and CBC q2hr in initial resuscitation period    MSK:  # Weakness and deconditioning of critical illness, risk of  - Physical and occupational therapy consult when appropriate  - Flat bedrest, do not break bed    General Cares/Prophylaxis:    DVT Prophylaxis: Pneumatic Compression Devices on RLE  GI Prophylaxis: PPI  Restraints: Restraints for medical healing needed: YES    Lines/ tubes/ drains:  - ETT  - Lu  - Abthera  - PIV x3  - R IJ  - R radial arterial line    Re enforce right groin w/ strong tape    Disposition:  -  Surgical ICU.     Patient seen, findings and plan discussed with surgical ICU fellow and staff, Dr. Farris.    Zeina Tillman MD, PharmD  General Surgery, PGY2  Pager [phone]    Clinically Significant Risk Factors Present on Admission                 # Coagulation Defect: INR = 1.71 (Ref range: 0.85 - 1.15) and/or PTT = 136 Seconds (Ref range: 22 - 38 Seconds), will monitor for bleeding    # Thrombocytopenia: Lowest platelets = 65 in last 2 days, will monitor for bleeding     # Hypertension: Noted on problem list              # Anemia: based on hgb <11           - - - - - - - - - - - - - - - - - - - - - - - - - - - - - - - - - - - - - - - - - - - - - -   HISTORY PRESENTING ILLNESS:   Alfredo Burnham is a 68 year old male with a past medical history of TIA, hypertension,  and blindness who presented to [facility] ED with severe right sided abdominal pain. Pain radiates to flank and low back. Pain started yesterday and has been worsening. Nausea with emesis x2 today. Previously had an issue with a bloody nose which required packing but no known bleeding disorders in himself or family. Bruises easily per family.     REVIEW OF SYSTEMS: Unable to obtain given patient status    PAST MEDICAL HISTORY:   Past Medical History:   Diagnosis Date    Hypertension 2/8/2011    Macular degeneration        SURGICAL HISTORY:   No past surgical history on file.    SOCIAL HISTORY:   Social History     Tobacco Use    Smoking status: Every Day     Packs/day: 0.50     Types: Cigarettes   Substance Use Topics    Alcohol use: Yes     Comment: 1-2/wk    Drug use: No       FAMILY HISTORY:   Family History   Problem Relation Age of Onset    Unknown/Adopted Mother     Heart Disease Mother     Arthritis Mother     Hypertension Brother     Blood Disease Sister     Psychotic Disorder Sister      No bleeding/clotting disorders nor problems with anesthesia.    ALLERGIES:   No Known Allergies    MEDICATIONS:  No current facility-administered medications on file prior to encounter.  amLODIPine-benazepril (LOTREL) 5-20 MG capsule, Take 1 capsule by mouth daily  hydrochlorothiazide (HYDRODIURIL) 25 MG tablet, Take 1 tablet by mouth daily.  varenicline (CHANTIX HERBERTH) 0.5 MG X 11 & 1 MG X 42 tablet, Take 1 tablet by mouth See Admin Instructions. 0.5 mg tab daily x 3 days, 0.5 mg tab twice daily x 4 day, then 1 mg twice daily  ATENOLOL PO, Take  by mouth.        PHYSICAL EXAMINATION:  Temp:  [97.5  F (36.4  C)-97.8  F (36.6  C)] 97.5  F (36.4  C)  Pulse:  [] 81  Resp:  [10-18] 16  BP: ()/() 109/72  SpO2:  [94 %-100 %] 98 %    General: critically ill man  HEENT:atraumatic, normocephalic  Neck: R IJ in place  Neuro: sedated   Pulm/Resp: intubated, mechanical ventilation   CV: RRR  Abdomen: Soft, abthera in  place  :  aparicio catheter in place with clear yellow urine  MSK/Extremities: RLE WWP. (+) DP signal right foot. LLE cool to touch, mottled. No PT/DP signals. Bilateral palpable femoral pulses    LABS: Reviewed.   Arterial Blood Gases   No lab results found in last 7 days.  Complete Blood Count   Recent Labs   Lab 09/25/23  0145 09/24/23  1644   WBC 5.6 8.5   HGB 9.3* 15.6   PLT 65* 96*     Basic Metabolic Panel  Recent Labs   Lab 09/24/23  1706 09/24/23  1644   NA  --  134*   POTASSIUM  --  4.6   CHLORIDE  --  98   CO2  --  21*   BUN  --  14.4   CR 1.1 1.00   GLC  --  152*     Liver Function Tests  Recent Labs   Lab 09/25/23  0145 09/24/23  1644   AST  --  34   ALT  --  27   ALKPHOS  --  76   BILITOTAL  --  0.8   ALBUMIN  --  4.6   INR 1.71*  --      Pancreatic Enzymes  Recent Labs   Lab 09/24/23  1644   LIPASE 21     Coagulation Profile  Recent Labs   Lab 09/25/23  0145   INR 1.71*   *       IMAGING:  Recent Results (from the past 24 hour(s))   CTA  ABDOMEN PELVIS BILAT LEG RUNOFF W CONTRAST   Result Value    Radiologist flags Active arterial bleeding or acute aortic pathology (AA)    Narrative    EXAM: CTA ABDOMEN PELVIS BILAT LEG RUNOFF W CONTR  LOCATION: [facility]  DATE: 9/24/2023    INDICATION: severe R sided abdominal pelvic pain radiating into leg, severe htn, please eval for aortic pathology or other acute intraabdominal process  COMPARISON: None.  TECHNIQUE: Helical acquisition through the abdomen, pelvis, and bilateral lower extremities was performed during the arterial phase of contrast enhancement using IV Contrast. 2D and 3D reconstructions were performed by the CT technologist. Dose reduction   techniques were used.   CONTRAST: 100mL isovue 370    FINDINGS:  AORTA: Moderate mixed attenuation atheroma in the descending thoracic aorta. There is a large infrarenal abdominal aortic aneurysm. The aneurysm sac contains near circumferential thrombus with  patent central channel. Along the right posterolateral   aneurysm wall there is a focal outpouching which is high attenuation on the precontrast series associated with surrounding inflammatory stranding consistent with contained rupture (series 6, image 26). At this level in the axial plane the aortic aneurysm   measures 6.4 x 8.4 cm (series 9, image 467). Edema/blood products extends around the IVC, renal veins and right renal artery. No active contrast extravasation. The aneurysm is contiguous with the left common iliac artery which measures up to 3.1 cm in   diameter proximally. There is also ectasia of the right common iliac artery. Moderate patchy diffuse patchy mixed attenuation atheroma in the external and internal iliac arteries. The proximal right internal iliac artery is ectatic measuring up to 1.6   cm.    RIGHT LEG: Moderate mixed attenuation atheroma the right common femoral artery without critical stenosis. The superficial femoral artery has patchy atheromatous plaque but no narrowing. The deep femoral artery is widely patent. The right popliteal artery   has moderate plaque but no high-grade stenosis. Patchy atheromatous calcifications of the tibioperoneal trunk and proximal trifurcation vessels however there is two-vessel runoff to the right ankle.    There are superficial varicosities in the left calf particularly medially which drain to a piece symmetrically dilated greater saphenous vein.    LEFT LEG: The left common femoral artery is widely patent with mild circumferential wall calcification. The deep femoral artery is ectatic proximally measuring up to 1 cm. The superficial femoral artery has patchy moderate atheromatous plaque but no   high-grade stenosis. The left popliteal artery is patent. Mild atheromatous calcifications of the tibioperoneal trunk. 3 vessel runoff to the left ankle.    LUNG BASES: No actionable nodules, lung edema or airspace opacities. No pleural fluid.    ABDOMEN: Hepatic  steatosis. Normal gallbladder and bile ducts. Pancreas, adrenal glands, and spleen are normal. Kidneys are normal in size. Benign cortical renal cyst arising from the right kidney does not require specific workup or follow-up. No   hydronephrosis or nephrolithiasis. No bowel wall thickening or inflammation. Sigmoid diverticulosis. No intraperitoneal free air or free fluid.    PELVIS: Prostate gland is heterogeneous but not enlarged. Seminal vesicles are symmetric. No pelvic free fluid.      Impression    IMPRESSION:    1.  Infrarenal abdominal aortic aneurysm contiguous with the left common iliac artery measuring up to 6.4 x 8.4 cm. There is a focal ectasia in the right posterolateral wall with edema/blood products around the aneurysm in the retroperitoneum consistent   with contained rupture. Vascular surgery consult is suggested.  2.  Moderate patchy atherosclerosis of the external iliac arteries and arteries of both lower extremities but no high-grade stenosis. 2 vessel runoff to the right ankle and 3 vessel runoff to the left ankle.  3.  Hepatic steatosis.  4.  Diverticulosis but no diverticulitis.      [Critical Result: Active arterial bleeding or acute aortic pathology]    Finding was identified on 9/24/2023 6:32 PM CDT.     Dr. De La Rosa was contacted by me on 9/24/2023 6:30 PM CDT and verbalized understanding of the critical result.    CT External Imaging Abdomen    Narrative    Images were obtained from an external facility.  Click PACS Images   hyperlink to view images.  Textual results have been scanned into the   media tab.

## 2023-09-25 NOTE — PHARMACY-ADMISSION MEDICATION HISTORY
Pharmacist Admission Medication History    Admission medication history is complete. The information provided in this note is only as accurate as the sources available at the time of the update.    Medication reconciliation/reorder completed by provider prior to medication history? Yes    Information Source(s): Family member and CareEverywhere/SureScripts via in-person    Pertinent Information: Allergy added - Penicillin causes facial swelling.  Team contacted to consider alternate empiric antibiotics (none administered yet)    Changes made to PTA medication list:  Added: None  Deleted: atenolol  Changed: None    Allergies reviewed with patient and updates made in EHR: yes    Medication History Completed By: Abbi Sweet RPH 9/25/2023 8:29 AM    Prior to Admission medications    Medication Sig Last Dose Taking? Auth Provider Long Term End Date   amLODIPine-benazepril (LOTREL) 5-20 MG capsule Take 1 capsule by mouth daily Past Week Yes Reported, Patient Yes

## 2023-09-25 NOTE — CONSULTS
Gastroenterology Consultation      Date of Admission:  9/24/2023  Reason for Admission: Ruptured AAA   Date of Consult  9/25/2023   Requesting Physician:  Boris Lowe           ASSESSMENT AND RECOMMENDATIONS:   Assessment:  68 year old male with a PMH significant for HTN and previous TIA and blindness now admitted 9/24 here with abdominal pain found to have likely contained ruptured AAA on CT.  GI consulted to consider flex sig to evaluate for ischemic bowel and possible planning for further surgical interventions.    #Hematochezia  #AAA rupture, s/p resection with aortic biliary bypass and bifurcated draft and temporary abd closure (9/25)  Patient presented with 1 day of x2 maroon watery/mucousy stool outputs on POD#1 from AAA rupture repair with open abd/temporary abd closure.  Hgb 9.3 today (baseline 15.6).  Lactic acid 6.4 (improved from 11.2 upon adm).  Previously required MTP activation with multiple products (see ICU note for summary) and pressors x3 but now improving postop and off all pressors.  Vascular surgery plans to RTOR 9/25 for abd exploration, washout and team requesting flex sig at bedside to evaluate for necrosis and possible need to involve CR team for bowel resection.    Recommendations:  -Plan for flex sig at bedside in ICU around 10:30/11:00  -Continue NPO status   -Supportive cares for GI bleed:  -- Ensure two large bore IVs  -- Adequate volume resuscitation with IVF, pRBC  -- Maintain up to date type and screen.  -- Monitor Hgb closely. Transfuse for Hgb<7 (improved outcomes with restricted vs liberal threshold)  -- Monitor stool output.  Document color and quality.  -- Avoid NSAID  -Analgesia/Antiemetics per primary team    Discussed with SICU team and CR team who agreeable to have GI proceed with flex sig.    Gastroenterology follow up recommendations:   TBD pending clinical course.    Thank you for involving us in this patient's care. Please do not hesitate to contact the GI  service with any questions or concerns.     Pt seen and care plan discussed with Dr. Walker, GI staff physician.    Overall time spent on the date of this encounter preparing to see the patient (including chart review of available notes, clinical status events, imaging and labs); obtaining and/or reviewing separately obtained history ; ordering medications, tests or procedures; communicating with other health care professionals; and documenting the above clinical information in the electronic medical record was 60 minutes.      Leah Rowan PA-C  GI Service  [facility]  Text Page  -------------------------------------------------------------------------------------------------------------------       Reason for Consultation:   Bloody stool s/p ruptured AAA repair without OTTO reimplantation, want to get flex sig            History of Present Illness:   Alfredo Burnham is a 68 year old male with a 68 year old male with a PMH significant for HTN and previous TIA and blindness now admitted 9/24 here with abdominal pain found to have likely contained ruptured AAA on CT.  GI consulted to consider flex sig to evaluate for ischemic bowel and possible planning for further surgical interventions.    Patient seen and examined at 09:00. History is obtained from RN and chart as pt is intubated/sedated and no family at bedside.  Reported acute onset of severe R abd pain radiating to his flank and lower back as well as down right leg x1 day PTA.  Additionaly had N/V (x2 episodes, unclear if bloody). Had denied any dysuria, hematuria nor any urinary symptoms chest pain, shortness of breath, diarrhea. No history of abdominal surgeries or kidney stones.  Reported BM day of admission but not specified if e/o melena/hematochezia. Had reported prior episode of epistaxis to vascular team requiring packings but no known bleeding d/o.    Previous Procedures:  No prior endoscopic procedures found  in EMR            Past Medical History:   Reviewed and edited as appropriate  Past Medical History:   Diagnosis Date    Hypertension 2/8/2011    Macular degeneration             Past Surgical History:   Reviewed and edited as appropriate   No past surgical history on file.           Social History:   The patient lives in [place], [place].  .           Family History:   Patient's family history is reviewed today and is non-contributory    Family History   Problem Relation Age of Onset    Unknown/Adopted Mother     Heart Disease Mother     Arthritis Mother     Hypertension Brother     Blood Disease Sister     Psychotic Disorder Sister              Allergies:   Reviewed and edited as appropriate   No Known Allergies         Medications:     Current Facility-Administered Medications   Medication    acetaminophen (TYLENOL) tablet 650 mg    bisacodyl (DULCOLAX) suppository 10 mg    ceFEPIme (MAXIPIME) 2 g vial to attach to  mL bag for ADULTS or 50 mL bag for PEDS    chlorhexidine (PERIDEX) 0.12 % solution 15 mL    dexmedeTOMIDine (PRECEDEX) 4 mcg/mL in NS infusion    dextrose 10% infusion    glucose gel 15-30 g    Or    dextrose 50 % injection 25-50 mL    Or    glucagon injection 1 mg    fentaNYL (SUBLIMAZE) infusion    fibrinogen concentrate (Human) (FIBRYGA) injection 1,050 mg    fibrinogen concentrate (Human) (FIBRYGA) injection 1,050 mg    hydrALAZINE (APRESOLINE) injection 10 mg    hydroxocobalamin (CYANOKIT) 5 g in sodium chloride 0.9 % 200 mL intermittent infusion    insulin regular (MYXREDLIN) infusion    lactated ringers infusion    lidocaine (LMX4) kit    lidocaine 1 % 0.1-1 mL    [Held by provider] magnesium hydroxide (MILK OF MAGNESIA) suspension 30 mL    metroNIDAZOLE (FLAGYL) infusion 500 mg    naloxone (NARCAN) injection 0.2 mg    Or    naloxone (NARCAN) injection 0.4 mg    Or    naloxone (NARCAN) injection 0.2 mg    Or    naloxone (NARCAN) injection 0.4 mg    norepinephrine (LEVOPHED) 16 mg  in  mL infusion MAX CONC CENTRAL LINE    ondansetron (ZOFRAN ODT) ODT tab 4 mg    Or    ondansetron (ZOFRAN) injection 4 mg    pantoprazole (PROTONIX) 2 mg/mL suspension 40 mg    Or    pantoprazole (PROTONIX) IV push injection 40 mg    [Held by provider] polyethylene glycol (MIRALAX) Packet 17 g    prochlorperazine (COMPAZINE) injection 5 mg    Or    prochlorperazine (COMPAZINE) tablet 5 mg    propofol (DIPRIVAN) infusion    prothrombin 4 factor complex concentrate (KCENTRA) infusion 1,100 Units    prothrombin 4 factor complex concentrate (KCENTRA) infusion 1,100 Units    [Held by provider] senna-docusate (SENOKOT-S/PERICOLACE) 8.6-50 MG per tablet 1 tablet    sodium chloride (PF) 0.9% PF flush 3 mL    sodium chloride (PF) 0.9% PF flush 3 mL    sodium phosphate 9 mmol in sodium chloride 0.9 % 250 mL intermittent infusion    vasopressin (VASOSTRICT) 20 Units in sodium chloride 0.9 % 100 mL standard conc infusion             Review of Systems:     A complete review of systems was performed and is negative except as noted in the HPI           Physical Exam:   Temp: 97.2  F (36.2  C) Temp src: Axillary BP: 116/85 Pulse: 101   Resp: 22 SpO2: 100 % O2 Device: Mechanical Ventilator    Wt:   Wt Readings from Last 2 Encounters:   09/25/23 79.4 kg (175 lb 0.7 oz)   02/08/11 75.8 kg (167 lb)      General: Critically ill male, intubated and sedated.    HEENT: Head is AT/NC. Sclera anicteric. No conjunctival injection.  Oropharynx is clear, moist and w/o exudate or lesions. NGT to LIS with bilious outputs (non-bloody)  Lungs: On vent, compliant with settings.  Heart: Tachycardia with regular rhythm (on monitor)  Abdomen: Distended and firm, non-tender to palpation (limited exam  due to sedation).  Small amount (~1/4 c) of marroon/watery/mucousy stool outputs on chux.  MSK: no gross deformity  Skin: No jaundice or rash  Neurologic: Sedated           Data:   Labs and imaging below were independently reviewed and  interpreted    LAB WORK:    BMP  Recent Labs   Lab 09/25/23  0803 09/25/23  0700 09/25/23  0630 09/25/23  0608 09/25/23  0457 09/25/23  0344 09/24/23  1706 09/24/23  1644   NA  --   --   --   --   --  139  --  134*   POTASSIUM  --   --  2.5*  --   --  4.3  --  4.6   CHLORIDE  --   --   --   --   --  101  --  98   OMAR  --   --   --   --   --  11.2*  --  9.4   CO2  --   --   --   --   --  14*  --  21*   BUN  --   --   --   --   --  14.1  --  14.4   CR  --   --   --   --   --  1.08 1.1 1.00   * 104*  --  133* 138* 140*  --  152*     CBC  Recent Labs   Lab 09/25/23  0604 09/25/23  0344 09/25/23  0145 09/24/23  1644   WBC 4.5 4.9 5.6 8.5   RBC 3.74* 3.46* 2.68* 4.27*   HGB 12.5* 11.4* 9.3* 15.6   HCT 35.6* 33.9* 28.8* 45.1   MCV 95 98 108* 106*   MCH 33.4* 32.9 34.7* 36.5*   MCHC 35.1 33.6 32.3 34.6   RDW 16.8* 16.8* 15.2* 12.4   * 149* 65* 96*     INR  Recent Labs   Lab 09/25/23  0604 09/25/23  0145   INR 1.35* 1.71*     LFTs  Recent Labs   Lab 09/25/23  0344 09/24/23  1644   ALKPHOS 42 76   * 34   * 27   BILITOTAL 0.5 0.8   PROTTOTAL 3.8* 7.8   ALBUMIN 2.3* 4.6      PANC  Recent Labs   Lab 09/24/23  1644   LIPASE 21       IMAGING:  (Personally reviewed)  Results for orders placed or performed during the hospital encounter of 09/24/23   CTA  ABDOMEN PELVIS BILAT LEG RUNOFF W CONTRAST   Result Value Ref Range    Radiologist flags Active arterial bleeding or acute aortic pathology (AA)     Impression    IMPRESSION:    1.  Infrarenal abdominal aortic aneurysm contiguous with the left common iliac artery measuring up to 6.4 x 8.4 cm. There is a focal ectasia in the right posterolateral wall with edema/blood products around the aneurysm in the retroperitoneum consistent   with contained rupture. Vascular surgery consult is suggested.  2.  Moderate patchy atherosclerosis of the external iliac arteries and arteries of both lower extremities but no high-grade stenosis. 2 vessel runoff to the right  ankle and 3 vessel runoff to the left ankle.  3.  Hepatic steatosis.  4.  Diverticulosis but no diverticulitis.      [Critical Result: Active arterial bleeding or acute aortic pathology]    Finding was identified on 9/24/2023 6:32 PM CDT.     Dr. De La Rosa was contacted by me on 9/24/2023 6:30 PM CDT and verbalized understanding of the critical result.    XR Chest Port 1 View    Impression    IMPRESSION:  1. Endotracheal tube tip is in the mid thoracic trachea.  2. No acute airspace disease. Streaky perihilar opacities likely  represent atelectasis/edema.    I have personally reviewed the examination and initial interpretation  and I agree with the findings.    ILEANA SIEGEL MD         SYSTEM ID:  [id number]   XR Abdomen Port 1 View    Impression    Impression:   1. Gastric tube tip and sidehole project over the stomach. Packing  material present in the abdomen.  2. Nonobstructive bowel gas pattern.     I have personally reviewed the examination and initial interpretation  and I agree with the findings.    ILEANA SIEGEL MD         SYSTEM ID:  [id number]   Echocardiogram Complete   Result Value Ref Range    LVEF  >70%             =======================================================================

## 2023-09-25 NOTE — CONSULTS
Care Management Initial Consult    General Information  Assessment completed with: Spouse or significant other, Tabby  Type of CM/SW Visit: Initial Assessment    Primary Care Provider verified and updated as needed: Yes   Readmission within the last 30 days: no previous admission in last 30 days      Reason for Consult: discharge planning  Advance Care Planning: Advance Care Planning Reviewed: no concerns identified          Communication Assessment  Patient's communication style: spoken language (English or Bilingual)             Cognitive  Cognitive/Neuro/Behavioral: .WDL except, all  Level of Consciousness: sedated, unresponsive  Arousal Level: unresponsive  Orientation: other (see comments) (WENDI)  Mood/Behavior: other (see comments) (WENDI- sedated)  Best Language:  (WENDI)  Speech: endotracheal tube, unable to speak    Living Environment:   People in home: spouse  Tabby  Current living Arrangements: apartment      Able to return to prior arrangements: yes       Family/Social Support:  Care provided by: self, spouse/significant other  Provides care for: no one  Marital Status:   Wife, Children  Tabby       Description of Support System: Supportive, Involved    Support Assessment: Adequate family and caregiver support, Adequate social supports    Current Resources:   Patient receiving home care services: No     Community Resources: None  Equipment currently used at home:    Supplies currently used at home: None    Employment/Financial:  Employment Status: disabled        Financial Concerns: No concerns identified   Referral to Financial Worker: No       Does the patient's insurance plan have a 3 day qualifying hospital stay waiver?  No    Lifestyle & Psychosocial Needs:  Social Determinants of Health     Food Insecurity: Not on file   Depression: Not on file   Housing Stability: Not on file   Tobacco Use: High Risk (8/8/2022)    Patient History     Smoking Tobacco Use: Every Day     Smokeless Tobacco Use:  Unknown     Passive Exposure: Not on file   Financial Resource Strain: Not on file   Alcohol Use: Not on file   Transportation Needs: Not on file   Physical Activity: Not on file   Interpersonal Safety: Not on file   Stress: Not on file   Social Connections: Not on file       Functional Status:  Prior to admission patient needed assistance:   Dependent ADLs:: Independent, Bathing, Dressing, Eating, Grooming, Positioning, Transfers, Toileting  Dependent IADLs:: Independent, Cleaning, Cooking, Laundry, Shopping, Medication Management, Meal Preparation  Assesssment of Functional Status: Not at  functional baseline    Mental Health Status:  Mental Health Status: No Current Concerns       Chemical Dependency Status:  Chemical Dependency Status: No Current Concerns             Values/Beliefs:  Spiritual, Cultural Beliefs, Taoism Practices, Values that affect care: no               Additional Information:    Patient is admitted for open AAA repair.   RNCC met with the patient and family and explained care management role, confirmed demographics. Patient has not HCD.    Patient is legally blind. He is a . However, his P[facility] is not with [facility]. Per spouse Patient has VA benefits, and UCARE//UCARE Medicare plans.     Patient lives in apartment complex with this wife. There is an elevator in the building. Patient was completely independent with all his ADLS, meds and meals. Wife assisted with the transportation.     Patient was not connected with any community programs and was not using any DMEs prior to admission.     Care Management team will continue to follow for discharge needs during this hospitalization.       Heather Pagan, MSN, MA, CCM, RN  4C/4A ICU Care Coordinator  Phone:[phone]  Pager: [phone]    For Weekend & Holiday on call RN Care Coordinator:  [facility] ([phone]) Saturday & Sunday; [phone]) [facility] Recognized Holidays   Pager: [phone] Units: 4A, 4C, 4E.

## 2023-09-25 NOTE — ANESTHESIA CARE TRANSFER NOTE
Patient: Alfredo Burnham    Procedure: Procedure(s):  Resection of Ruptureed Abdominal Aneurysm, Aortic biliary bypass with 20 x 10 mm Bifurcated hemagard graft, Temporary Abdominal Closure, Endovascular Balloon Inclusion of Aorta       Diagnosis: Ruptured aneurysm of artery (H24) [I72.9]  Diagnosis Additional Information: No value filed.    Anesthesia Type:   General     Note:    Oropharynx: nasal gatric tube in place, bite block in place, endotracheal tube in place and ventilatory support  Level of Consciousness: iatrogenic sedation    Level of Supplemental Oxygen (L/min / FiO2): 50  Independent Airway: airway patency not satisfactory and stable  Dentition: dentition unchanged    Report to RN Given: handoff report given  Patient transferred to: ICU  Comments: ICU Handoff: Call for PAUSE to initiate/utilize ICU HANDOFF, Identified Patient, Identified Responsible Provider, Reviewed the Pertinent Medical History, Discussed Surgical Course, Reviewed Intra-OP Anesthesia Management and Issues during Anesthesia, Set Expectations for Post Procedure Period and Allowed Opportunity for Questions and Acknowledgement of Understanding    ICU Handoff: Call for PAUSE to initiate/utilize ICU HANDOFF, Identified Patient, Identified Responsible Provider, Reviewed the Pertinent Medical History, Discussed Surgical Course, Reviewed Intra-OP Anesthesia Management and Issues during Anesthesia, Set Expectations for Post Procedure Period and Allowed Opportunity for Questions and Acknowledgement of Understanding      Vitals:  Vitals Value Taken Time   /85 09/25/23 0345   Temp 35.4  C (95.72  F) 09/25/23 0417   Pulse 108 09/25/23 0417   Resp 25 09/25/23 0417   SpO2 100 % 09/25/23 0409   Vitals shown include unvalidated device data.    Electronically Signed By: Jeanie Price MD  September 25, 2023  4:17 AM

## 2023-09-25 NOTE — ANESTHESIA PROCEDURE NOTES
Perioperative ADDIS Procedure Note    Staff -        Anesthesiologist:  Mason Foss MD       Performed By: anesthesiologist  Preanesthesia Checklist:  Patient identified, IV assessed, risks and benefits discussed, monitors and equipment assessed, procedure being performed at surgeon's request and anesthesia consent obtained.    ADDIS Probe Insertion  Probe Number: Loaner 1  Probe Status PRE Insertion: NO obvious damage  Probe type:  Adult 3D  Bite block used:   Oral Airway  Insertion Technique: Recurrent attempts, Laryngoscopy  Insertion complications: None obvious  Billing Report:A ADDIS report is NOT being generated.  Probe Status POST Removal: NO obvious damage

## 2023-09-25 NOTE — ANESTHESIA POSTPROCEDURE EVALUATION
Patient: Alfredo Burnham    Procedure: Procedure(s):  Resection of Ruptureed Abdominal Aneurysm, Aortic biliary bypass with 20 x 10 mm Bifurcated hemagard graft, Temporary Abdominal Closure, Endovascular Balloon Inclusion of Aorta       Anesthesia Type:  General    Note:  Disposition: ICU            ICU Sign Out: Anesthesiologist/ICU physician sign out WAS performed   Postop Pain Control:    PONV:    Neuro/Psych:    Airway/Respiratory:             Sign Out: AIRWAY IN SITU/Resp. Support               Airway in situ/Resp. Support: ETT   CV/Hemodynamics:             Sign Out: Acceptable CV status   Other NRE: NONE   DID A NON-ROUTINE EVENT OCCUR? No           Last vitals:  Vitals:    09/24/23 2113 09/24/23 2120 09/25/23 0350   BP: 107/76 109/72    Pulse: 82 81    Resp: 16 16    Temp:      SpO2: 96% 98% 100%       Electronically Signed By: Mason Foss MD  September 25, 2023  4:24 AM

## 2023-09-25 NOTE — ADDENDUM NOTE
Addendum  created 09/25/23 0758 by Mason Foss MD    Clinical Note Signed, Intraprocedure Blocks edited, Intraprocedure Staff edited, SmartForm saved

## 2023-09-25 NOTE — ANESTHESIA PROCEDURE NOTES
Arterial Line Procedure Note    Pre-Procedure   Staff -        Anesthesiologist:  Mason Foss MD       Resident/Fellow: Janet Coyle MD       Performed By: anesthesiologist and resident       Location: OR       Pre-Anesthestic Checklist: patient identified, IV checked, risks and benefits discussed, informed consent, monitors and equipment checked, pre-op evaluation and at physician/surgeon's request  Timeout:       Correct Patient: Yes        Correct Procedure: Yes        Correct Site: Yes        Correct Position: Yes   Line Placement:   This line was placed Pre Induction starting at 9/24/2023 9:40 PM and ending at 9/24/2023 9:50 PM  Procedure   Procedure: arterial line, new line and elective       Laterality: right       Insertion Site: radial.  Sterile Prep        Standard elements of sterile barrier followed       Skin prep: Chloraprep  Insertion/Injection        Technique: ultrasound guided and Seldinger Technique        1. Ultrasound was used to evaluate the access site.       2. Artery evaluated via ultrasound for patency/adequacy.       3. Using real-time ultrasound the needle/catheter was observed entering the artery/vein.       Catheter Type/Size: 20 G, 1.75 in/4.5 cm quick cath (integral wire)  Narrative         Secured by: suture       Tegaderm dressing used.       Complications: None apparent,        Arterial waveform: Yes

## 2023-09-25 NOTE — PROGRESS NOTES
Pt transferred over to [facility] with for vascular surgery consult. Family updated at the bedside. Stable when left our ER and pain under control with IV dilaudid.

## 2023-09-25 NOTE — ANESTHESIA PREPROCEDURE EVALUATION
Anesthesia Pre-Procedure Evaluation    Patient: Alfredo Burnham   MRN: [id number] : 1955        Procedure : Procedure(s):  Repair aneurysm abdominal aorta          Past Medical History:   Diagnosis Date    Hypertension 2011    Macular degeneration       No past surgical history on file.   No Known Allergies   Social History     Tobacco Use    Smoking status: Every Day     Packs/day: 0.50     Types: Cigarettes    Smokeless tobacco: Not on file   Substance Use Topics    Alcohol use: Yes     Comment: 1-2/wk      Wt Readings from Last 1 Encounters:   23 71.7 kg (158 lb)        Anesthesia Evaluation   Pt has not had prior anesthetic         ROS/MED HX  ENT/Pulmonary:     (+)     RIGO risk factors,  hypertension,         tobacco use, Current use,                      Neurologic:     (+)              TIA,                  Cardiovascular:     (+)  hypertension- -   -  - -                                      METS/Exercise Tolerance:     Hematologic: Comments: Thrombocytopenia      Musculoskeletal:       GI/Hepatic:     (+) GERD,                   Renal/Genitourinary:  - neg Renal ROS  (-) renal disease   Endo:  - neg endo ROS  (-) Type II DM   Psychiatric/Substance Use:     (+)   alcohol abuse      Infectious Disease:  - neg infectious disease ROS     Malignancy:       Other:            Physical Exam    Airway        Mallampati: II   TM distance: > 3 FB   Neck ROM: full   Mouth opening: > 3 cm    Respiratory Devices and Support         Dental     Comment: Patient reports a filling fell out recently        Cardiovascular          Rhythm and rate: regular and normal     Pulmonary           breath sounds clear to auscultation           OUTSIDE LABS:  CBC:   Lab Results   Component Value Date    WBC 8.5 2023    WBC 7.0 2009    HGB 15.6 2023    HGB 15.1 2009    HCT 45.1 2023    HCT 43.9 2009    PLT 96 (L) 2023     2009     BMP:   Lab Results   Component  Value Date     (L) 09/24/2023     02/08/2011    POTASSIUM 4.6 09/24/2023    POTASSIUM 3.9 02/08/2011    CHLORIDE 98 09/24/2023    CHLORIDE 102 02/08/2011    CO2 21 (L) 09/24/2023    CO2 26 02/08/2011    BUN 14.4 09/24/2023    BUN 17 02/08/2011    CR 1.1 09/24/2023    CR 1.00 09/24/2023     (H) 09/24/2023     (H) 02/08/2011     COAGS:   Lab Results   Component Value Date    PTT 30 02/18/2009    INR 0.9 10/15/2009     POC: No results found for: BGM, HCG, HCGS  HEPATIC:   Lab Results   Component Value Date    ALBUMIN 4.6 09/24/2023    PROTTOTAL 7.8 09/24/2023    ALT 27 09/24/2023    AST 34 09/24/2023    ALKPHOS 76 09/24/2023    BILITOTAL 0.8 09/24/2023     OTHER:   Lab Results   Component Value Date    OMAR 9.4 09/24/2023    LIPASE 21 09/24/2023    TSH 2.978 02/24/2009       Anesthesia Plan    ASA Status:  5, emergent    NPO Status:  ELEVATED Aspiration Risk/Unknown    Anesthesia Type: General.     - Airway: ETT   Induction: RSI.   Maintenance: Balanced.   Techniques and Equipment:     - Airway: Video-Laryngoscope     - Lines/Monitors: 2nd IV, Arterial Line, BIS, Central Line     - Blood: Blood in Room, Cell Saver     Consents    Anesthesia Plan(s) and associated risks, benefits, and realistic alternatives discussed. Questions answered and patient/representative(s) expressed understanding.     - Discussed: Risks, Benefits and Alternatives for BOTH SEDATION and the PROCEDURE were discussed     - Discussed with:  Patient      - Extended Intubation/Ventilatory Support Discussed: Yes.      - Patient is DNR/DNI Status: No     Use of blood products discussed: Yes.     - Discussed with: Patient.     - Consented: consented to blood products            Reason for refusal: other.     Postoperative Care    Pain management: Multi-modal analgesia, IV analgesics.   PONV prophylaxis: Ondansetron (or other 5HT-3)     Comments:    Other Comments: No contraindications to ADDIS if needed intraop            Jeanie  MD Dee

## 2023-09-25 NOTE — ANESTHESIA PROCEDURE NOTES
Airway       Patient location during procedure: OR       Procedure Start/Stop Times: 9/24/2023 10:33 PM  Staff -        Anesthesiologist:  Mason Foss MD       Resident/Fellow: Jeanie Price MD       Performed By: residentIndications and Patient Condition       Indications for airway management: filemon-procedural       Induction type:RSI       Mask difficulty assessment: 0 - not attempted    Final Airway Details       Final airway type: endotracheal airway       Successful airway: ETT - single  Endotracheal Airway Details        ETT size (mm): 8.0       Cuffed: yes       Successful intubation technique: video laryngoscopy       VL Blade Size: MAC 4       Grade View of Cords: 1       Adjucts: stylet       Position: Right       Measured from: gums/teeth       Secured at (cm): 24       Bite block used: None    Post intubation assessment        Number of attempts at approach: 1       Secured with: pink tape       Ease of procedure: easy       Dentition: Intact and Unchanged    Medication(s) Administered   Medication Administration Time: 9/24/2023 10:33 PM

## 2023-09-25 NOTE — ED PROVIDER NOTES
"     Emergency Department Patient Sign-out       Brief HPI and ED course:  Patient is a 68 year old male signed out to me by the previous physician.  See initial ED Provider note for details of the presentation. In brief, [age]-year-old male with a past medical history of hypertension presenting to the emergency department due to abdominal pain found on CT to have a 7 cm infrarenal aneurysm initially read on CT due to concurrent with concern for inflammatory changes and fat stranding surrounding and, but over read just before transfer demonstrated contained rupture.  Patient endorsing abdominal pain, but no dizziness, chest pain, shortness of breath or nausea    Vitals:   Patient Vitals for the past 24 hrs:   BP Temp Temp src Pulse Resp SpO2 Height Weight   09/24/23 1957 (!) 137/96 -- -- 89 -- 98 % -- --   09/24/23 1954 (!) 142/97 -- -- 96 -- 98 % -- --   09/24/23 1952 (!) 158/103 -- -- 89 -- 97 % -- --   09/24/23 1950 (!) 158/115 97.5  F (36.4  C) -- 90 17 98 % -- --   09/24/23 1922 -- -- -- 89 15 98 % -- --   09/24/23 1920 (!) 159/103 -- -- 87 15 98 % -- --   09/24/23 1915 (!) 155/106 -- -- 90 13 97 % -- --   09/24/23 1900 (!) 146/104 -- -- 98 15 97 % -- --   09/24/23 1858 -- -- -- 102 16 97 % -- --   09/24/23 1857 -- -- -- 104 13 98 % -- --   09/24/23 1856 -- -- -- 102 10 97 % -- --   09/24/23 1855 (!) 141/101 -- -- 101 15 100 % -- --   09/24/23 1845 (!) 153/104 -- -- 105 13 99 % -- --   09/24/23 1834 (!) 178/117 -- -- 103 17 100 % -- --   09/24/23 1745 131/89 -- -- 96 13 97 % -- --   09/24/23 1730 122/85 -- -- 93 12 100 % -- --   09/24/23 1715 (!) 156/104 -- -- 102 17 100 % -- --   09/24/23 1707 (!) 153/109 -- -- 105 17 99 % -- --   09/24/23 1700 (!) 167/118 -- -- 106 13 99 % -- --   09/24/23 1645 (!) 179/124 -- -- 118 -- -- -- --   09/24/23 1544 -- -- -- -- -- -- 1.727 m (5' 8\") 71.7 kg (158 lb)   09/24/23 1540 (!) 178/129 -- -- 117 -- -- -- --   09/24/23 1537 -- 97.8  F (36.6  C) Oral -- 18 100 % -- -- "       Received Sign-out Plan:    Pending:   - transfer, vascular recommendations    Events after assuming care:  After care was assumed, a focused history and physical was performed. Agree with findings relayed by previous provider.       --  Subsequent Critical Care Addendum (Modifier -25)  This patient had an Emergency Department evaluation and management performed by myself at an earlier time that the patient did not require critical care. Subsequently, the patient's state changed such that critical care was medically necessary. The critical care provided was separate and distinct from the Emergency Department evaluation and management performed prior.     Based on my evaluation of the patient, Alfredo Burnham has ruptured abdominal aortic aneurysm, which requires immediate intervention, and therefore he is critically ill.     The care team initiated multiple lab tests, initiated medication therapy with nicardipine, and consulted with vascular surgery  to provide stabilization care. Due to the critical nature of this patient, I reassessed nursing observations, vital signs, physical exam, review of cardiac rhythm monitor, mental status, and neurologic status multiple times prior to his disposition.     Time also spent performing documentation, discussion with family to obtain medical information for decision making, reviewing test results, discussion with consultants, and coordination of care.     Critical care time (excluding teaching time and procedures): 50 minutes.       Patient arrived to the emergency department and was persistently hypertensive on the esmolol drip.  This had been increased without any significant improvement of his blood pressure.  Decision was made to also added nicardipine drip on top of this.  Vascular surgery paged and at bedside within 10 minutes of arrival.  Updated on the concern for redemonstrating evidence of contained rupture at this time.  They will plan for operative  repair    8:00 PM started on nicardipine drip on top of the esmolol drip.  Vascular surgery at bedside.  We will plan for operative repair at this time.  Patient is currently being consented between him and his family.  Otherwise doing well and will admit to the vascular surgery ICU, with plans to go immediately to the OR in guarded status    9:50 PM pt waited in ER for OR. Continued to do well needing a few doses of dilaudid. BP came down with nicardipine drip. Now off to OR     --  Cheri Cavazos MD   Emergency Medicine         Cheri Cavazos MD  09/24/23 5245

## 2023-09-25 NOTE — PROGRESS NOTES
Vascular Surgery Progress Note  09/25/2023       Subjective:  Received 3u pRBC, 3 FFP, 1u cryo overnight.   Weaned off pressors, lactic acid 6.1 from 11, making adequate UOP. Remains intubated, sedated in ICU. Having bloody stool this am, plan for flex sig with GI today.     Objective:  Temp:  [95  F (35  C)-97.8  F (36.6  C)] 95.9  F (35.5  C)  Pulse:  [] 101  Resp:  [10-25] 22  BP: ()/() 116/85  MAP:  [43 mmHg-110 mmHg] 110 mmHg  Arterial Line BP: ()/(67-92) 144/92  FiO2 (%):  [50 %] 50 %  SpO2:  [94 %-100 %] 100 %    I/O last 3 completed shifts:  In: 21357.24 [I.V.:6453.24; Other:3360]  Out: 47420 [Urine:612; Drains:1450; Blood:8850]      Gen: intubated, sedated, not responsive to verbal stimuli  CV: RR per radial pulse, off pressors, sinus tach per tele  Resp: intubated, on ventilator  Abd: wound vac in place, holding seal, abdomen somewhat firm on left side, unable to assess tenderness due to patient sedation status.    Ext: RLE wwp, mulitphasic DP and PT signal. LLE cool to touch,somewhat dusky although with slow but present capillary refill. no DP/PT signals, multiphasic popliteal signal. Compartments soft, compressible.     UOP: 40cc/hr   Temp abd closure output: ~ 125cc/hr       Labs:  Recent Labs   Lab 09/25/23  0604 09/25/23  0344 09/25/23  0145   WBC 4.5 4.9 5.6   HGB 12.5* 11.4* 9.3*   * 149* 65*       Recent Labs   Lab 09/25/23  0700 09/25/23  0630 09/25/23  0608 09/25/23  0457 09/25/23  0344 09/24/23  1706 09/24/23  1644   NA  --   --   --   --  139  --  134*   POTASSIUM  --  2.5*  --   --  4.3  --  4.6   CHLORIDE  --   --   --   --  101  --  98   CO2  --   --   --   --  14*  --  21*   BUN  --   --   --   --  14.1  --  14.4   CR  --   --   --   --  1.08 1.1 1.00   *  --  133* 138* 140*  --  152*   OMAR  --   --   --   --  11.2*  --  9.4   MAG  --   --   --   --  2.3  --   --    PHOS  --   --   --   --  4.9*  --   --        Imaging:  XR Chest Port 1 View    Result  Date: 9/25/2023  EXAM: XR CHEST PORT 1 VIEW  9/25/2023 4:00 AM HISTORY:  Endotracheal tube positioning   COMPARISON:  None TECHNIQUE: Portable AP supine radiograph of chest FINDINGS: Endotracheal tube tip is in the mid thoracic trachea. Right IJ CVC tip projects over the cavoatrial junction.. Enteric tube projects over the stomach. The trachea is midline. The cardiomediastinal silhouette is within normal limits. The pulmonary vasculature is distinct. No appreciable pneumothorax or pleural effusion. No focal airspace consolidation. No acute osseous abnormality.     IMPRESSION: 1. Endotracheal tube tip is in the mid thoracic trachea. 2. No acute airspace disease. Streaky perihilar opacities likely represent atelectasis/edema. I have personally reviewed the examination and initial interpretation and I agree with the findings. ILEANA SIEGEL MD   SYSTEM ID:  [id number]    XR Abdomen Port 1 View    Result Date: 9/25/2023  Exam: XR ABDOMEN PORT 1 VIEW, 9/25/2023 4:12 AM Indication: NGT in place Comparison: None Findings: Portable AP supine radiograph of the abdomen. Enteric tube tip and sidehole project over the stomach. Partially visualized central venous catheter tip projecting over the right atrium. Surgical packing material projects over the abdomen. Multiple surgical clips projecting over the abdomen. Nonobstructive bowel gas pattern. No pneumatosis or portal venous gas.     Impression: 1. Gastric tube tip and sidehole project over the stomach. Packing material present in the abdomen. 2. Nonobstructive bowel gas pattern. I have personally reviewed the examination and initial interpretation and I agree with the findings. ILEANA SIEGEL MD   SYSTEM ID:  [id number]    ADDIS with Report    Result Date: 9/25/2023  Mason Foss MD     9/25/2023  7:53 AM Perioperative ADDIS Procedure Note Staff -      Anesthesiologist:  Mason Foss MD      Performed By: anesthesiologist Preanesthesia Checklist:  Patient identified, IV assessed,  risks and benefits discussed, monitors and equipment assessed, procedure being performed at surgeon's request and anesthesia consent obtained. ADDIS Probe Insertion Probe Number: Loaner 1 Probe Status PRE Insertion: NO obvious damage Probe type:  Adult 3D Bite block used:   Oral Airway Insertion Technique: Recurrent attempts, Laryngoscopy Insertion complications: None obvious Billing Report:A ADDIS report is NOT being generated. Probe Status POST Removal: NO obvious damage        Assessment/Plan:   68 year old male with PMH of HTN and previous TIA who presented with R sided abdominal pain found to have contained ruptured AAA, now s/p open AAA repair 9/24 into 9/25am with 30 min supraceliac clamp time, prolonged infrarenal clamp time, temporary abdominal closure. He remains critically ill in the ICU, although with some improvements in his lactic acidosis, he had bloody stool 9/25 am with plan for flex sig today, and continues to have no signals in his LLE concerning for ischemia. We will continue to monitor LLE given patient overall clinical status is not stable enough for further intervention. Plan for continued resuscitation today in the ICU as well as RTOR this afternoon 3-4pm for exchange of quick clot and replacement wound vac on abdomen, plan to leave open at that time.     -RTOR today 3-4pm for abd exploration, washout, replacement temporary abdominal closure.   - GI consult for flex sig given bloody stools   - Please broaden abx to cover for gram negatives in setting of possible bowel ischemia  - Pain/sedation medication per ICU   - Continue vent support, wean as able  - Continue warming LLE  - Goal MAP >65  - Recommend ongoing resuscitation, patient appears to be fluid down still based on PPV easily seen on tele today, recommend ongoing boluses/resuscitation with blood products as needed to maintain volume status  - Continue LR @ 150cc/hr    - NPO   - Replete electrolytes, especially potassium at 2.5   - Keep  patient warm please, note that axillary temps are higher, does not appear oral temp probe is in right place as is reading at 95, however per ax temps pt is 97  - Continue insulin gtt as needed  - Continue aparicio       Discussed with vascular surgery staff, Dr. Kandace Mcleod who is in agreement with the above.  - - - - - - - - - - - - - - - - - -  Will Donald, PGY-3  Vascular Surgery Resident

## 2023-09-25 NOTE — BRIEF OP NOTE
[facility]    Brief Operative Note    Pre-operative diagnosis: Ruptured abdominal aortic aneurysm (AAA), unspecified part (H) [I71.30]  Post-operative diagnosis Same as pre-operative diagnosis    Procedure: Procedure(s):  Incision and drainage abdomen washout, combined, wound vac placement, abthera, possible left lower extremity embolectomy, possible left lower extremity fasciotomies  Laparotomy exploratory  Surgeon: Surgeon(s) and Role:  Panel 1:     * Ash Boucher MBBS - Primary     * Ada Donald MD - Resident - Assisting  Panel 2:     * Roger Shirley MD - Primary  Anesthesia: General   Estimated Blood Loss: 30cc    Drains: None  Specimens:   ID Type Source Tests Collected by Time Destination   1 : tibial thrombus Tissue Other SURGICAL PATHOLOGY EXAM Ash Boucher MBBS 9/25/2023  6:04 PM    2 : popliteal artery thrombus Tissue Other SURGICAL PATHOLOGY EXAM Ash Boucher MBBS 9/25/2023  6:05 PM    3 : popliteal atheroma Tissue Other SURGICAL PATHOLOGY EXAM Ash Boucher MBBS 9/25/2023  6:17 PM      Findings:   Healthy appearing colon and rectum. Small intestine with some patchy dusky areas,   Thrombus removed from L SFA, popliteal artery, as well as tibial vessels via camacho embolectomy. No backbleeding from tibials appreciated despite significant thrombus removed. Initially with good inflow after thrombectomy however rethrombosed almost immediately after thrombectomy, reopened and did another run with camacho with restoration of good inflow with no visible thrombus removed, another run afterward confirmed no thrombus, however unfortunately the vessel rethrombosed almost immediately. We ran another camacho catheter again and restored inflow, and closed the vessel however patient did not have palpable pulse in popliteal after closure.     No L DP/PT signals at end of case. Obstructive signal in AK popliteal artery.     Implants: * No implants in log  *

## 2023-09-25 NOTE — BRIEF OP NOTE
"[facility]    Brief Operative Note    Pre-operative diagnosis: Ruptured aneurysm of aorta  Post-operative diagnosis Same as pre-operative diagnosis    Procedure: Procedure(s):  Resection of Ruptureed Abdominal Aneurysm, Aortic biliary bypass with 20 x 10 mm Bifurcated hemagard graft, Temporary Abdominal Closure, Endovascular Balloon Inclusion of Aorta  Surgeon: Surgeon(s) and Role:     * Boris Lowe - Primary     * Zonia Florentino MD - Fellow - Assisting     * Jonathan Fry MD  Anesthesia: * No anesthesia type entered *   Estimated Blood Loss: 8850 mL from 9/24/2023  9:25 PM to 9/25/2023  3:25 AM      Drains: Temporary abdominal closure  Specimens: * No specimens in log *  Findings:   Thrombosed material, rupture in the right lateral wall, calcifications .  Complications: Unable to find left pedal signals -- palpable pre-op .  Implants:   Implant Name Type Inv. Item Serial No.  Lot No. LRB No. Used Action   GRAFT HEMAGARD BIFURCATED [id number] - [id number] Graft GRAFT HEMAGARD BIFURCATED [id number] [employer] [id number] N/A 1 Implanted   GRAFT PTFE FELT 4X4\" - [id number] Graft GRAFT PTFE FELT 4X4\"  [employer] [id number] N/A 1 Implanted             "

## 2023-09-25 NOTE — H&P
Vascular Surgery History and Physical  September 24, 2023    Assessment and Plan:  68 year old male with history of HTN and previous TIA here with abdominal pain found to have likely contained ruptured AAA on CT.     -Plan for OR tonight for open vs endovascular repair.   - Continue esmolol and nicardipine to acheive SBP <120 and HR <80    Discussed with staff, Dr. Lowe, who agrees with the above plan.    Zeina Tillman MD, PharmD  General Surgery, PGY2  Pager [phone]    CC: abdominal pain    HPI:  Alfredo Burnham is a 68 year old male with a past medical history of TIA, hypertension, and blindness who presented to [facility] ED with severe right sided abdominal pain. Pain radiates to flank and low back. Pain started yesterday and has been worsening. Nausea with emesis x2 today. Previously had an issue with a bloody nose which required packing but no known bleeding disorders in himself or family. Bruises easily per family.     Medical History:  Past Medical History:   Diagnosis Date    Hypertension 2/8/2011    Macular degeneration        Surgical History:  No past surgical history on file.    Social History:  Social History     Tobacco Use    Smoking status: Every Day     Packs/day: 0.50     Types: Cigarettes   Substance Use Topics    Alcohol use: Yes     Comment: 1-2/wk    Drug use: No       Family History:  Family History   Problem Relation Age of Onset    Unknown/Adopted Mother     Heart Disease Mother     Arthritis Mother     Hypertension Brother     Blood Disease Sister     Psychotic Disorder Sister        Medications:  Current Facility-Administered Medications   Medication    esmolol 20 mg/mL (BREVIBLOC) infusion 100 mL    niCARdipine 40 mg in 200 mL NS (CARDENE) infusion     Current Outpatient Medications   Medication    amLODIPine-benazepril (LOTREL) 5-20 MG capsule    hydrochlorothiazide (HYDRODIURIL) 25 MG tablet    varenicline (CHANTIX HERBERTH) 0.5 MG X 11 & 1 MG X 42 tablet    ATENOLOL PO        Allergies:   No Known Allergies    Exam:  B/P: 115/80, T: 97.5, P: 84, R: 17  No intake or output data in the 24 hours ending 09/24/23 2014    Gen: Non-toxic appearing, no acute distress  HEENT: Atraumatic, normocephalic  Neuro: Alert and oriented. No gross neurologic deficits   Pulm: nonlabored breathing, comfortable on room air , no cough  CV: RRR by radial pulse, noncyanotic   ABD: Soft, tender   MSK:  Normal active range of motion, no edema  Skin: Warm, dry, no rashes or abrasions on exposed skin  Psych: Cooperative, appropriate mood and affect    Laboratory Results:  Lab Results   Component Value Date    WBC 8.5 09/24/2023    WBC 7.0 02/18/2009     Lab Results   Component Value Date    HGB 15.6 09/24/2023    HGB 15.1 02/18/2009     Lab Results   Component Value Date    HCT 45.1 09/24/2023    HCT 43.9 02/18/2009     Lab Results   Component Value Date    PLT 96 09/24/2023     02/18/2009     Last Basic Metabolic Panel:  Lab Results   Component Value Date     09/24/2023     02/08/2011      Lab Results   Component Value Date    POTASSIUM 4.6 09/24/2023    POTASSIUM 3.9 02/08/2011     Lab Results   Component Value Date    CHLORIDE 98 09/24/2023    CHLORIDE 102 02/08/2011     Lab Results   Component Value Date    OMAR 9.4 09/24/2023    OMAR 9.2 02/08/2011     Lab Results   Component Value Date    CO2 21 09/24/2023    CO2 26 02/08/2011     Lab Results   Component Value Date    BUN 14.4 09/24/2023    BUN 17 02/08/2011     Lab Results   Component Value Date    CR 1.1 09/24/2023    CR 1.00 09/24/2023    CR 1.12 02/08/2011     Lab Results   Component Value Date     09/24/2023     02/08/2011       Imaging:  CTA   IMPRESSION:  1.  Infrarenal abdominal aortic aneurysm contiguous with the left common iliac artery measuring up to 6.4 x 8.4 cm. There is a focal ectasia in the right posterolateral wall with edema/blood products around the aneurysm in the retroperitoneum consistent   with  contained rupture. Vascular surgery consult is suggested.  2.  Moderate patchy atherosclerosis of the external iliac arteries and arteries of both lower extremities but no high-grade stenosis. 2 vessel runoff to the right ankle and 3 vessel runoff to the left ankle.  3.  Hepatic steatosis.  4.  Diverticulosis but no diverticulitis.

## 2023-09-26 ENCOUNTER — ANESTHESIA (OUTPATIENT)
Dept: SURGERY | Facility: CLINIC | Age: 68
DRG: 268 | End: 2023-09-26
Payer: COMMERCIAL

## 2023-09-26 LAB
ACT BLD: 164 SECONDS (ref 74–150)
ACT BLD: 218 SECONDS (ref 74–150)
ACT BLD: 226 SECONDS (ref 74–150)
ACT BLD: >1000 SECONDS (ref 74–150)
ALBUMIN SERPL BCG-MCNC: 1.8 G/DL (ref 3.5–5.2)
ALBUMIN SERPL BCG-MCNC: 1.9 G/DL (ref 3.5–5.2)
ALBUMIN SERPL BCG-MCNC: 1.9 G/DL (ref 3.5–5.2)
ALBUMIN SERPL BCG-MCNC: 2.2 G/DL (ref 3.5–5.2)
ALBUMIN SERPL BCG-MCNC: 2.3 G/DL (ref 3.5–5.2)
ALLEN'S TEST: ABNORMAL
ALP SERPL-CCNC: 37 U/L (ref 40–129)
ALP SERPL-CCNC: 39 U/L (ref 40–129)
ALP SERPL-CCNC: 40 U/L (ref 40–129)
ALP SERPL-CCNC: 40 U/L (ref 40–129)
ALP SERPL-CCNC: 49 U/L (ref 40–129)
ALT SERPL W P-5'-P-CCNC: 197 U/L (ref 0–70)
ALT SERPL W P-5'-P-CCNC: 199 U/L (ref 0–70)
ALT SERPL W P-5'-P-CCNC: 206 U/L (ref 0–70)
ALT SERPL W P-5'-P-CCNC: 241 U/L (ref 0–70)
ALT SERPL W P-5'-P-CCNC: 256 U/L (ref 0–70)
ANION GAP SERPL CALCULATED.3IONS-SCNC: 11 MMOL/L (ref 7–15)
ANION GAP SERPL CALCULATED.3IONS-SCNC: 11 MMOL/L (ref 7–15)
ANION GAP SERPL CALCULATED.3IONS-SCNC: 12 MMOL/L (ref 7–15)
ANION GAP SERPL CALCULATED.3IONS-SCNC: 13 MMOL/L (ref 7–15)
ANION GAP SERPL CALCULATED.3IONS-SCNC: 13 MMOL/L (ref 7–15)
APTT PPP: 34 SECONDS (ref 22–38)
APTT PPP: 35 SECONDS (ref 22–38)
APTT PPP: 36 SECONDS (ref 22–38)
APTT PPP: 36 SECONDS (ref 22–38)
APTT PPP: 37 SECONDS (ref 22–38)
AST SERPL W P-5'-P-CCNC: 1012 U/L (ref 0–45)
AST SERPL W P-5'-P-CCNC: 718 U/L (ref 0–45)
AST SERPL W P-5'-P-CCNC: 753 U/L (ref 0–45)
AST SERPL W P-5'-P-CCNC: 773 U/L (ref 0–45)
AST SERPL W P-5'-P-CCNC: 800 U/L (ref 0–45)
ATRIAL RATE - MUSE: 86 BPM
BASE EXCESS BLDA CALC-SCNC: -0.4 MMOL/L (ref -9–1.8)
BASE EXCESS BLDA CALC-SCNC: -0.7 MMOL/L (ref -9–1.8)
BASE EXCESS BLDA CALC-SCNC: -12.8 MMOL/L (ref -9.6–2)
BASE EXCESS BLDA CALC-SCNC: -13.9 MMOL/L (ref -9.6–2)
BASE EXCESS BLDA CALC-SCNC: -14.1 MMOL/L (ref -9.6–2)
BASE EXCESS BLDA CALC-SCNC: -14.6 MMOL/L (ref -9.6–2)
BASE EXCESS BLDA CALC-SCNC: -2 MMOL/L (ref -9–1.8)
BASE EXCESS BLDA CALC-SCNC: -2.6 MMOL/L (ref -9.6–2)
BASE EXCESS BLDA CALC-SCNC: -2.6 MMOL/L (ref -9.6–2)
BASE EXCESS BLDA CALC-SCNC: -4.9 MMOL/L (ref -9–1.8)
BASE EXCESS BLDA CALC-SCNC: -5.2 MMOL/L (ref -9.6–2)
BASE EXCESS BLDA CALC-SCNC: -6.4 MMOL/L (ref -9.6–2)
BASE EXCESS BLDA CALC-SCNC: -8 MMOL/L (ref -9.6–2)
BASE EXCESS BLDA CALC-SCNC: 0.6 MMOL/L (ref -9.6–2)
BILIRUB SERPL-MCNC: 1 MG/DL
BILIRUB SERPL-MCNC: 1.1 MG/DL
BILIRUB SERPL-MCNC: 1.2 MG/DL
BILIRUB SERPL-MCNC: 1.4 MG/DL
BILIRUB SERPL-MCNC: 1.6 MG/DL
BLD PROD TYP BPU: NORMAL
BLD PROD TYP BPU: NORMAL
BLOOD COMPONENT TYPE: NORMAL
BLOOD COMPONENT TYPE: NORMAL
BUN SERPL-MCNC: 26.4 MG/DL (ref 8–23)
BUN SERPL-MCNC: 27.4 MG/DL (ref 8–23)
BUN SERPL-MCNC: 27.4 MG/DL (ref 8–23)
BUN SERPL-MCNC: 27.5 MG/DL (ref 8–23)
BUN SERPL-MCNC: 27.7 MG/DL (ref 8–23)
CA-I BLD-MCNC: 3.1 MG/DL (ref 4.4–5.2)
CA-I BLD-MCNC: 4 MG/DL (ref 4.4–5.2)
CA-I BLD-MCNC: 4.1 MG/DL (ref 4.4–5.2)
CA-I BLD-MCNC: 4.2 MG/DL (ref 4.4–5.2)
CA-I BLD-MCNC: 4.2 MG/DL (ref 4.4–5.2)
CA-I BLD-MCNC: 4.3 MG/DL (ref 4.4–5.2)
CA-I BLD-MCNC: 4.4 MG/DL (ref 4.4–5.2)
CA-I BLD-MCNC: 4.5 MG/DL (ref 4.4–5.2)
CA-I BLD-MCNC: 4.6 MG/DL (ref 4.4–5.2)
CA-I BLD-MCNC: 4.8 MG/DL (ref 4.4–5.2)
CA-I BLD-MCNC: 5.3 MG/DL (ref 4.4–5.2)
CA-I BLD-MCNC: 8.1 MG/DL (ref 4.4–5.2)
CALCIUM SERPL-MCNC: 6.9 MG/DL (ref 8.8–10.2)
CALCIUM SERPL-MCNC: 7.1 MG/DL (ref 8.8–10.2)
CALCIUM SERPL-MCNC: 7.4 MG/DL (ref 8.8–10.2)
CALCIUM SERPL-MCNC: 7.7 MG/DL (ref 8.8–10.2)
CALCIUM SERPL-MCNC: 7.8 MG/DL (ref 8.8–10.2)
CHLORIDE SERPL-SCNC: 104 MMOL/L (ref 98–107)
CHLORIDE SERPL-SCNC: 106 MMOL/L (ref 98–107)
CHLORIDE SERPL-SCNC: 106 MMOL/L (ref 98–107)
CHLORIDE SERPL-SCNC: 108 MMOL/L (ref 98–107)
CHLORIDE SERPL-SCNC: 108 MMOL/L (ref 98–107)
CLOT INIT KAOL IND TO POST HEP NEUT TRTO: 1 {RATIO}
CLOT INIT KAOL IND TO POST HEP NEUT TRTO: 1.2 {RATIO}
CLOT INIT KAOL IND TO POST HEP NEUT TRTO: 1.9 {RATIO}
CLOT INIT KAOLIN IND BLD US: 110 SEC (ref 113–166)
CLOT INIT KAOLIN IND BLD US: 165 SEC (ref 113–166)
CLOT INIT KAOLIN IND BLD US: 374 SEC (ref 113–166)
CLOT INIT KAOLIN IND P HEP NEUT BLD US: 111 SEC (ref 103–153)
CLOT INIT KAOLIN IND P HEP NEUT BLD US: 134 SEC (ref 103–153)
CLOT INIT KAOLIN IND P HEP NEUT BLD US: 195 SEC (ref 103–153)
CLOT STIFF PLT CONT BLD CALC: 8.1 HPA (ref 11.9–29.8)
CLOT STIFF PLT CONT BLD CALC: <2 HPA (ref 11.9–29.8)
CLOT STIFF PLT CONT BLD CALC: NORMAL HPA
CLOT STIFF TF IND P HEP NEUT BLD US: 9.6 HPA (ref 13–33.2)
CLOT STIFF TF IND P HEP NEUT BLD US: <2 HPA (ref 13–33.2)
CLOT STIFF TF IND P HEP NEUT BLD US: NORMAL HPA
CLOT STIFF TF IND+IIB-IIIA INH P HEP NEU: 0.3 HPA (ref 1–3.7)
CLOT STIFF TF IND+IIB-IIIA INH P HEP NEU: 1.5 HPA (ref 1–3.7)
CLOT STIFF TF IND+IIB-IIIA INH P HEP NEU: NORMAL HPA
CODING SYSTEM: NORMAL
CODING SYSTEM: NORMAL
CREAT SERPL-MCNC: 2.93 MG/DL (ref 0.67–1.17)
CREAT SERPL-MCNC: 3.04 MG/DL (ref 0.67–1.17)
CREAT SERPL-MCNC: 3.19 MG/DL (ref 0.67–1.17)
CREAT SERPL-MCNC: 3.32 MG/DL (ref 0.67–1.17)
CREAT SERPL-MCNC: 3.58 MG/DL (ref 0.67–1.17)
DEPRECATED HCO3 PLAS-SCNC: 18 MMOL/L (ref 22–29)
DEPRECATED HCO3 PLAS-SCNC: 19 MMOL/L (ref 22–29)
DEPRECATED HCO3 PLAS-SCNC: 20 MMOL/L (ref 22–29)
DEPRECATED HCO3 PLAS-SCNC: 21 MMOL/L (ref 22–29)
DEPRECATED HCO3 PLAS-SCNC: 21 MMOL/L (ref 22–29)
DIASTOLIC BLOOD PRESSURE - MUSE: NORMAL MMHG
EGFRCR SERPLBLD CKD-EPI 2021: 18 ML/MIN/1.73M2
EGFRCR SERPLBLD CKD-EPI 2021: 19 ML/MIN/1.73M2
EGFRCR SERPLBLD CKD-EPI 2021: 20 ML/MIN/1.73M2
EGFRCR SERPLBLD CKD-EPI 2021: 21 ML/MIN/1.73M2
EGFRCR SERPLBLD CKD-EPI 2021: 22 ML/MIN/1.73M2
ERYTHROCYTE [DISTWIDTH] IN BLOOD BY AUTOMATED COUNT: 17.9 % (ref 10–15)
ERYTHROCYTE [DISTWIDTH] IN BLOOD BY AUTOMATED COUNT: 18 % (ref 10–15)
ERYTHROCYTE [DISTWIDTH] IN BLOOD BY AUTOMATED COUNT: 18.1 % (ref 10–15)
ERYTHROCYTE [DISTWIDTH] IN BLOOD BY AUTOMATED COUNT: 18.1 % (ref 10–15)
ERYTHROCYTE [DISTWIDTH] IN BLOOD BY AUTOMATED COUNT: 18.3 % (ref 10–15)
FIBRINOGEN PPP-MCNC: 283 MG/DL (ref 170–490)
FIBRINOGEN PPP-MCNC: 319 MG/DL (ref 170–490)
FIBRINOGEN PPP-MCNC: 363 MG/DL (ref 170–490)
FIBRINOGEN PPP-MCNC: 405 MG/DL (ref 170–490)
FIBRINOGEN PPP-MCNC: NORMAL MG/DL
GLUCOSE BLD-MCNC: 105 MG/DL (ref 70–99)
GLUCOSE BLD-MCNC: 131 MG/DL (ref 70–99)
GLUCOSE BLD-MCNC: 134 MG/DL (ref 70–99)
GLUCOSE BLD-MCNC: 147 MG/DL (ref 70–99)
GLUCOSE BLD-MCNC: 163 MG/DL (ref 70–99)
GLUCOSE BLD-MCNC: 209 MG/DL (ref 70–99)
GLUCOSE BLD-MCNC: 229 MG/DL (ref 70–99)
GLUCOSE BLD-MCNC: 245 MG/DL (ref 70–99)
GLUCOSE BLD-MCNC: 245 MG/DL (ref 70–99)
GLUCOSE BLD-MCNC: 251 MG/DL (ref 70–99)
GLUCOSE BLDC GLUCOMTR-MCNC: 106 MG/DL (ref 70–99)
GLUCOSE BLDC GLUCOMTR-MCNC: 125 MG/DL (ref 70–99)
GLUCOSE BLDC GLUCOMTR-MCNC: 136 MG/DL (ref 70–99)
GLUCOSE BLDC GLUCOMTR-MCNC: 136 MG/DL (ref 70–99)
GLUCOSE BLDC GLUCOMTR-MCNC: 140 MG/DL (ref 70–99)
GLUCOSE BLDC GLUCOMTR-MCNC: 141 MG/DL (ref 70–99)
GLUCOSE BLDC GLUCOMTR-MCNC: 79 MG/DL (ref 70–99)
GLUCOSE BLDC GLUCOMTR-MCNC: 90 MG/DL (ref 70–99)
GLUCOSE BLDC GLUCOMTR-MCNC: 99 MG/DL (ref 70–99)
GLUCOSE SERPL-MCNC: 107 MG/DL (ref 70–99)
GLUCOSE SERPL-MCNC: 110 MG/DL (ref 70–99)
GLUCOSE SERPL-MCNC: 130 MG/DL (ref 70–99)
GLUCOSE SERPL-MCNC: 133 MG/DL (ref 70–99)
GLUCOSE SERPL-MCNC: 133 MG/DL (ref 70–99)
HCO3 BLD-SCNC: 21 MMOL/L (ref 21–28)
HCO3 BLD-SCNC: 22 MMOL/L (ref 21–28)
HCO3 BLD-SCNC: 22 MMOL/L (ref 21–28)
HCO3 BLD-SCNC: 23 MMOL/L (ref 21–28)
HCO3 BLDA-SCNC: 13 MMOL/L (ref 21–28)
HCO3 BLDA-SCNC: 13 MMOL/L (ref 21–28)
HCO3 BLDA-SCNC: 14 MMOL/L (ref 21–28)
HCO3 BLDA-SCNC: 15 MMOL/L (ref 21–28)
HCO3 BLDA-SCNC: 17 MMOL/L (ref 21–28)
HCO3 BLDA-SCNC: 18 MMOL/L (ref 21–28)
HCO3 BLDA-SCNC: 21 MMOL/L (ref 21–28)
HCO3 BLDA-SCNC: 21 MMOL/L (ref 21–28)
HCO3 BLDA-SCNC: 22 MMOL/L (ref 21–28)
HCO3 BLDA-SCNC: 26 MMOL/L (ref 21–28)
HCT VFR BLD AUTO: 28.5 % (ref 40–53)
HCT VFR BLD AUTO: 28.9 % (ref 40–53)
HCT VFR BLD AUTO: 29 % (ref 40–53)
HCT VFR BLD AUTO: 32.4 % (ref 40–53)
HCT VFR BLD AUTO: 32.5 % (ref 40–53)
HGB BLD-MCNC: 10 G/DL (ref 13.3–17.7)
HGB BLD-MCNC: 10.1 G/DL (ref 13.3–17.7)
HGB BLD-MCNC: 10.2 G/DL (ref 13.3–17.7)
HGB BLD-MCNC: 10.4 G/DL (ref 13.3–17.7)
HGB BLD-MCNC: 10.7 G/DL (ref 13.3–17.7)
HGB BLD-MCNC: 11.2 G/DL (ref 13.3–17.7)
HGB BLD-MCNC: 11.7 G/DL (ref 13.3–17.7)
HGB BLD-MCNC: 11.8 G/DL (ref 13.3–17.7)
HGB BLD-MCNC: 12.8 G/DL (ref 13.3–17.7)
HGB BLD-MCNC: 12.9 G/DL (ref 13.3–17.7)
HGB BLD-MCNC: 13.8 G/DL (ref 13.3–17.7)
HGB BLD-MCNC: 8.3 G/DL (ref 13.3–17.7)
HGB BLD-MCNC: 8.5 G/DL (ref 13.3–17.7)
HGB BLD-MCNC: 8.9 G/DL (ref 13.3–17.7)
HGB BLD-MCNC: 9.4 G/DL (ref 13.3–17.7)
INR PPP: 1.46 (ref 0.85–1.15)
INR PPP: 1.58 (ref 0.85–1.15)
INR PPP: 1.6 (ref 0.85–1.15)
INR PPP: 1.63 (ref 0.85–1.15)
INR PPP: NORMAL
INTERPRETATION ECG - MUSE: NORMAL
ISSUE DATE AND TIME: NORMAL
ISSUE DATE AND TIME: NORMAL
LACTATE BLD-SCNC: 1.1 MMOL/L
LACTATE BLD-SCNC: 1.8 MMOL/L
LACTATE BLD-SCNC: 1.9 MMOL/L
LACTATE BLD-SCNC: 1.9 MMOL/L
LACTATE BLD-SCNC: 10.3 MMOL/L
LACTATE BLD-SCNC: 12.2 MMOL/L
LACTATE BLD-SCNC: 4.8 MMOL/L
LACTATE BLD-SCNC: 5.3 MMOL/L
LACTATE BLD-SCNC: 7 MMOL/L
LACTATE BLD-SCNC: 9 MMOL/L
LACTATE SERPL-SCNC: 1.9 MMOL/L (ref 0.7–2)
LACTATE SERPL-SCNC: 2 MMOL/L (ref 0.7–2)
LACTATE SERPL-SCNC: 2.1 MMOL/L (ref 0.7–2)
LACTATE SERPL-SCNC: 2.2 MMOL/L (ref 0.7–2)
LACTATE SERPL-SCNC: 3.1 MMOL/L (ref 0.7–2)
MAGNESIUM SERPL-MCNC: 1.9 MG/DL (ref 1.7–2.3)
MAGNESIUM SERPL-MCNC: 2.1 MG/DL (ref 1.7–2.3)
MAGNESIUM SERPL-MCNC: 2.3 MG/DL (ref 1.7–2.3)
MAGNESIUM SERPL-MCNC: 2.3 MG/DL (ref 1.7–2.3)
MAGNESIUM SERPL-MCNC: 2.5 MG/DL (ref 1.7–2.3)
MCH RBC QN AUTO: 33.4 PG (ref 26.5–33)
MCH RBC QN AUTO: 33.8 PG (ref 26.5–33)
MCH RBC QN AUTO: 34.1 PG (ref 26.5–33)
MCHC RBC AUTO-ENTMCNC: 34.5 G/DL (ref 31.5–36.5)
MCHC RBC AUTO-ENTMCNC: 34.8 G/DL (ref 31.5–36.5)
MCHC RBC AUTO-ENTMCNC: 35.1 G/DL (ref 31.5–36.5)
MCHC RBC AUTO-ENTMCNC: 36 G/DL (ref 31.5–36.5)
MCHC RBC AUTO-ENTMCNC: 36.4 G/DL (ref 31.5–36.5)
MCV RBC AUTO: 94 FL (ref 78–100)
MCV RBC AUTO: 94 FL (ref 78–100)
MCV RBC AUTO: 96 FL (ref 78–100)
MCV RBC AUTO: 97 FL (ref 78–100)
MCV RBC AUTO: 97 FL (ref 78–100)
O2/TOTAL GAS SETTING VFR VENT: 35 %
O2/TOTAL GAS SETTING VFR VENT: 45 %
O2/TOTAL GAS SETTING VFR VENT: 50 %
O2/TOTAL GAS SETTING VFR VENT: 54 %
O2/TOTAL GAS SETTING VFR VENT: 55 %
O2/TOTAL GAS SETTING VFR VENT: 55 %
O2/TOTAL GAS SETTING VFR VENT: 60 %
O2/TOTAL GAS SETTING VFR VENT: 65 %
O2/TOTAL GAS SETTING VFR VENT: 93 %
OXYHGB MFR BLD: 96 % (ref 92–100)
OXYHGB MFR BLD: 98 % (ref 92–100)
OXYHGB MFR BLD: 98 % (ref 92–100)
OXYHGB MFR BLD: 99 % (ref 92–100)
P AXIS - MUSE: 57 DEGREES
PCO2 BLD: 29 MM HG (ref 35–45)
PCO2 BLD: 33 MM HG (ref 35–45)
PCO2 BLD: 34 MM HG (ref 35–45)
PCO2 BLD: 39 MM HG (ref 35–45)
PCO2 BLDA: 28 MM HG (ref 35–45)
PCO2 BLDA: 32 MM HG (ref 35–45)
PCO2 BLDA: 32 MM HG (ref 35–45)
PCO2 BLDA: 33 MM HG (ref 35–45)
PCO2 BLDA: 37 MM HG (ref 35–45)
PCO2 BLDA: 37 MM HG (ref 35–45)
PCO2 BLDA: 39 MM HG (ref 35–45)
PCO2 BLDA: 42 MM HG (ref 35–45)
PCO2 BLDA: 43 MM HG (ref 35–45)
PCO2 BLDA: 46 MM HG (ref 35–45)
PH BLD: 7.33 [PH] (ref 7.35–7.45)
PH BLD: 7.42 [PH] (ref 7.35–7.45)
PH BLD: 7.45 [PH] (ref 7.35–7.45)
PH BLD: 7.49 [PH] (ref 7.35–7.45)
PH BLDA: 7.12 [PH] (ref 7.35–7.45)
PH BLDA: 7.16 [PH] (ref 7.35–7.45)
PH BLDA: 7.17 [PH] (ref 7.35–7.45)
PH BLDA: 7.27 [PH] (ref 7.35–7.45)
PH BLDA: 7.31 [PH] (ref 7.35–7.45)
PH BLDA: 7.33 [PH] (ref 7.35–7.45)
PH BLDA: 7.36 [PH] (ref 7.35–7.45)
PH BLDA: 7.37 [PH] (ref 7.35–7.45)
PH BLDA: 7.39 [PH] (ref 7.35–7.45)
PH BLDA: 7.43 [PH] (ref 7.35–7.45)
PHOSPHATE SERPL-MCNC: 3.7 MG/DL (ref 2.5–4.5)
PHOSPHATE SERPL-MCNC: 3.9 MG/DL (ref 2.5–4.5)
PHOSPHATE SERPL-MCNC: 4 MG/DL (ref 2.5–4.5)
PHOSPHATE SERPL-MCNC: 4.1 MG/DL (ref 2.5–4.5)
PHOSPHATE SERPL-MCNC: 4.7 MG/DL (ref 2.5–4.5)
PLATELET # BLD AUTO: 102 10E3/UL (ref 150–450)
PLATELET # BLD AUTO: 111 10E3/UL (ref 150–450)
PLATELET # BLD AUTO: 85 10E3/UL (ref 150–450)
PLATELET # BLD AUTO: 85 10E3/UL (ref 150–450)
PLATELET # BLD AUTO: 89 10E3/UL (ref 150–450)
PO2 BLD: 105 MM HG (ref 80–105)
PO2 BLD: 139 MM HG (ref 80–105)
PO2 BLD: 142 MM HG (ref 80–105)
PO2 BLD: 143 MM HG (ref 80–105)
PO2 BLDA: 106 MM HG (ref 80–105)
PO2 BLDA: 108 MM HG (ref 80–105)
PO2 BLDA: 117 MM HG (ref 80–105)
PO2 BLDA: 122 MM HG (ref 80–105)
PO2 BLDA: 184 MM HG (ref 80–105)
PO2 BLDA: 188 MM HG (ref 80–105)
PO2 BLDA: 199 MM HG (ref 80–105)
PO2 BLDA: 217 MM HG (ref 80–105)
PO2 BLDA: 240 MM HG (ref 80–105)
PO2 BLDA: 258 MM HG (ref 80–105)
POTASSIUM BLD-SCNC: 4 MMOL/L (ref 3.5–5)
POTASSIUM BLD-SCNC: 4.4 MMOL/L (ref 3.5–5)
POTASSIUM BLD-SCNC: 4.5 MMOL/L (ref 3.5–5)
POTASSIUM BLD-SCNC: 4.7 MMOL/L (ref 3.5–5)
POTASSIUM BLD-SCNC: 4.9 MMOL/L (ref 3.5–5)
POTASSIUM BLD-SCNC: 4.9 MMOL/L (ref 3.5–5)
POTASSIUM BLD-SCNC: 5.3 MMOL/L (ref 3.5–5)
POTASSIUM BLD-SCNC: 5.6 MMOL/L (ref 3.5–5)
POTASSIUM SERPL-SCNC: 4.4 MMOL/L (ref 3.4–5.3)
POTASSIUM SERPL-SCNC: 4.5 MMOL/L (ref 3.4–5.3)
POTASSIUM SERPL-SCNC: 4.5 MMOL/L (ref 3.4–5.3)
POTASSIUM SERPL-SCNC: 4.6 MMOL/L (ref 3.4–5.3)
POTASSIUM SERPL-SCNC: 5.2 MMOL/L (ref 3.4–5.3)
PR INTERVAL - MUSE: 144 MS
PROT SERPL-MCNC: 3.4 G/DL (ref 6.4–8.3)
PROT SERPL-MCNC: 3.5 G/DL (ref 6.4–8.3)
PROT SERPL-MCNC: 3.6 G/DL (ref 6.4–8.3)
PROT SERPL-MCNC: 3.8 G/DL (ref 6.4–8.3)
PROT SERPL-MCNC: 3.8 G/DL (ref 6.4–8.3)
QRS DURATION - MUSE: 66 MS
QT - MUSE: 368 MS
QTC - MUSE: 440 MS
R AXIS - MUSE: 10 DEGREES
RBC # BLD AUTO: 2.96 10E6/UL (ref 4.4–5.9)
RBC # BLD AUTO: 2.99 10E6/UL (ref 4.4–5.9)
RBC # BLD AUTO: 3.08 10E6/UL (ref 4.4–5.9)
RBC # BLD AUTO: 3.35 10E6/UL (ref 4.4–5.9)
RBC # BLD AUTO: 3.46 10E6/UL (ref 4.4–5.9)
SODIUM BLD-SCNC: 129 MMOL/L (ref 135–145)
SODIUM BLD-SCNC: 129 MMOL/L (ref 135–145)
SODIUM BLD-SCNC: 130 MMOL/L (ref 135–145)
SODIUM BLD-SCNC: 133 MMOL/L (ref 135–145)
SODIUM BLD-SCNC: 134 MMOL/L (ref 135–145)
SODIUM BLD-SCNC: 135 MMOL/L (ref 135–145)
SODIUM BLD-SCNC: 137 MMOL/L (ref 135–145)
SODIUM BLD-SCNC: 138 MMOL/L (ref 135–145)
SODIUM BLD-SCNC: 139 MMOL/L (ref 135–145)
SODIUM BLD-SCNC: 141 MMOL/L (ref 135–145)
SODIUM SERPL-SCNC: 138 MMOL/L (ref 135–145)
SODIUM SERPL-SCNC: 138 MMOL/L (ref 135–145)
SODIUM SERPL-SCNC: 138 MMOL/L (ref 136–145)
SODIUM SERPL-SCNC: 138 MMOL/L (ref 136–145)
SODIUM SERPL-SCNC: 139 MMOL/L (ref 135–145)
SYSTOLIC BLOOD PRESSURE - MUSE: NORMAL MMHG
T AXIS - MUSE: 177 DEGREES
UNIT ABO/RH: NORMAL
UNIT ABO/RH: NORMAL
UNIT NUMBER: NORMAL
UNIT NUMBER: NORMAL
UNIT STATUS: NORMAL
UNIT STATUS: NORMAL
UNIT TYPE ISBT: 6200
UNIT TYPE ISBT: 6200
VENTRICULAR RATE- MUSE: 86 BPM
WBC # BLD AUTO: 5.2 10E3/UL (ref 4–11)
WBC # BLD AUTO: 5.2 10E3/UL (ref 4–11)
WBC # BLD AUTO: 6 10E3/UL (ref 4–11)
WBC # BLD AUTO: 6 10E3/UL (ref 4–11)
WBC # BLD AUTO: 7.1 10E3/UL (ref 4–11)

## 2023-09-26 PROCEDURE — 250N000009 HC RX 250: Performed by: STUDENT IN AN ORGANIZED HEALTH CARE EDUCATION/TRAINING PROGRAM

## 2023-09-26 PROCEDURE — 250N000011 HC RX IP 250 OP 636: Performed by: SURGERY

## 2023-09-26 PROCEDURE — 250N000025 HC SEVOFLURANE, PER MIN: Performed by: SURGERY

## 2023-09-26 PROCEDURE — 85384 FIBRINOGEN ACTIVITY: CPT | Performed by: PHYSICIAN ASSISTANT

## 2023-09-26 PROCEDURE — 82805 BLOOD GASES W/O2 SATURATION: CPT | Performed by: PHARMACIST

## 2023-09-26 PROCEDURE — 83605 ASSAY OF LACTIC ACID: CPT | Performed by: PHARMACIST

## 2023-09-26 PROCEDURE — 250N000009 HC RX 250: Performed by: NURSE ANESTHETIST, CERTIFIED REGISTERED

## 2023-09-26 PROCEDURE — 258N000003 HC RX IP 258 OP 636: Performed by: PHYSICIAN ASSISTANT

## 2023-09-26 PROCEDURE — 200N000002 HC R&B ICU UMMC

## 2023-09-26 PROCEDURE — 0WJG0ZZ INSPECTION OF PERITONEAL CAVITY, OPEN APPROACH: ICD-10-PCS | Performed by: SURGERY

## 2023-09-26 PROCEDURE — 250N000011 HC RX IP 250 OP 636: Mod: JZ

## 2023-09-26 PROCEDURE — 0KBT0ZZ EXCISION OF LEFT LOWER LEG MUSCLE, OPEN APPROACH: ICD-10-PCS | Performed by: SURGERY

## 2023-09-26 PROCEDURE — 258N000003 HC RX IP 258 OP 636

## 2023-09-26 PROCEDURE — 85027 COMPLETE CBC AUTOMATED: CPT | Performed by: PHYSICIAN ASSISTANT

## 2023-09-26 PROCEDURE — 84295 ASSAY OF SERUM SODIUM: CPT

## 2023-09-26 PROCEDURE — 272N000001 HC OR GENERAL SUPPLY STERILE: Performed by: SURGERY

## 2023-09-26 PROCEDURE — P9037 PLATE PHERES LEUKOREDU IRRAD: HCPCS | Performed by: PHARMACIST

## 2023-09-26 PROCEDURE — 250N000011 HC RX IP 250 OP 636: Mod: JZ | Performed by: STUDENT IN AN ORGANIZED HEALTH CARE EDUCATION/TRAINING PROGRAM

## 2023-09-26 PROCEDURE — 258N000003 HC RX IP 258 OP 636: Performed by: STUDENT IN AN ORGANIZED HEALTH CARE EDUCATION/TRAINING PROGRAM

## 2023-09-26 PROCEDURE — 84100 ASSAY OF PHOSPHORUS: CPT | Performed by: PHYSICIAN ASSISTANT

## 2023-09-26 PROCEDURE — 85610 PROTHROMBIN TIME: CPT | Performed by: PHYSICIAN ASSISTANT

## 2023-09-26 PROCEDURE — 250N000009 HC RX 250: Performed by: SURGERY

## 2023-09-26 PROCEDURE — 999N000157 HC STATISTIC RCP TIME EA 10 MIN

## 2023-09-26 PROCEDURE — 82040 ASSAY OF SERUM ALBUMIN: CPT | Performed by: PHYSICIAN ASSISTANT

## 2023-09-26 PROCEDURE — 83735 ASSAY OF MAGNESIUM: CPT | Performed by: PHYSICIAN ASSISTANT

## 2023-09-26 PROCEDURE — 258N000003 HC RX IP 258 OP 636: Performed by: ANESTHESIOLOGY

## 2023-09-26 PROCEDURE — 84450 TRANSFERASE (AST) (SGOT): CPT | Performed by: PHYSICIAN ASSISTANT

## 2023-09-26 PROCEDURE — 85730 THROMBOPLASTIN TIME PARTIAL: CPT | Performed by: PHYSICIAN ASSISTANT

## 2023-09-26 PROCEDURE — 3E033XZ INTRODUCTION OF VASOPRESSOR INTO PERIPHERAL VEIN, PERCUTANEOUS APPROACH: ICD-10-PCS | Performed by: SURGERY

## 2023-09-26 PROCEDURE — 99291 CRITICAL CARE FIRST HOUR: CPT | Mod: GC | Performed by: SURGERY

## 2023-09-26 PROCEDURE — 250N000011 HC RX IP 250 OP 636: Performed by: STUDENT IN AN ORGANIZED HEALTH CARE EDUCATION/TRAINING PROGRAM

## 2023-09-26 PROCEDURE — 370N000017 HC ANESTHESIA TECHNICAL FEE, PER MIN: Performed by: SURGERY

## 2023-09-26 PROCEDURE — 82330 ASSAY OF CALCIUM: CPT | Performed by: PHARMACIST

## 2023-09-26 PROCEDURE — 250N000011 HC RX IP 250 OP 636: Mod: JZ | Performed by: ANESTHESIOLOGY

## 2023-09-26 PROCEDURE — 258N000003 HC RX IP 258 OP 636: Performed by: PHARMACIST

## 2023-09-26 PROCEDURE — C9113 INJ PANTOPRAZOLE SODIUM, VIA: HCPCS | Mod: JZ | Performed by: STUDENT IN AN ORGANIZED HEALTH CARE EDUCATION/TRAINING PROGRAM

## 2023-09-26 PROCEDURE — 250N000013 HC RX MED GY IP 250 OP 250 PS 637: Performed by: STUDENT IN AN ORGANIZED HEALTH CARE EDUCATION/TRAINING PROGRAM

## 2023-09-26 PROCEDURE — 360N000076 HC SURGERY LEVEL 3, PER MIN: Performed by: SURGERY

## 2023-09-26 PROCEDURE — 99231 SBSQ HOSP IP/OBS SF/LOW 25: CPT | Performed by: NURSE PRACTITIONER

## 2023-09-26 PROCEDURE — 250N000009 HC RX 250: Performed by: PHARMACIST

## 2023-09-26 PROCEDURE — 84075 ASSAY ALKALINE PHOSPHATASE: CPT | Performed by: PHYSICIAN ASSISTANT

## 2023-09-26 PROCEDURE — 250N000009 HC RX 250: Performed by: ANESTHESIOLOGY

## 2023-09-26 PROCEDURE — 82803 BLOOD GASES ANY COMBINATION: CPT

## 2023-09-26 PROCEDURE — 250N000011 HC RX IP 250 OP 636: Performed by: PHARMACIST

## 2023-09-26 PROCEDURE — P9045 ALBUMIN (HUMAN), 5%, 250 ML: HCPCS | Mod: JZ | Performed by: ANESTHESIOLOGY

## 2023-09-26 RX ORDER — CEFEPIME HYDROCHLORIDE 2 G/1
2 INJECTION, POWDER, FOR SOLUTION INTRAVENOUS EVERY 24 HOURS
Status: DISCONTINUED | OUTPATIENT
Start: 2023-09-26 | End: 2023-09-30

## 2023-09-26 RX ORDER — VASOPRESSIN IN 0.9 % NACL 2 UNIT/2ML
SYRINGE (ML) INTRAVENOUS PRN
Status: DISCONTINUED | OUTPATIENT
Start: 2023-09-26 | End: 2023-09-26

## 2023-09-26 RX ORDER — FENTANYL CITRATE 50 UG/ML
INJECTION, SOLUTION INTRAMUSCULAR; INTRAVENOUS PRN
Status: DISCONTINUED | OUTPATIENT
Start: 2023-09-26 | End: 2023-09-26

## 2023-09-26 RX ORDER — SODIUM CHLORIDE, SODIUM GLUCONATE, SODIUM ACETATE, POTASSIUM CHLORIDE AND MAGNESIUM CHLORIDE 526; 502; 368; 37; 30 MG/100ML; MG/100ML; MG/100ML; MG/100ML; MG/100ML
INJECTION, SOLUTION INTRAVENOUS CONTINUOUS PRN
Status: DISCONTINUED | OUTPATIENT
Start: 2023-09-26 | End: 2023-09-26

## 2023-09-26 RX ORDER — NOREPINEPHRINE BITARTRATE 0.06 MG/ML
INJECTION, SOLUTION INTRAVENOUS
Status: DISCONTINUED
Start: 2023-09-26 | End: 2023-09-27 | Stop reason: HOSPADM

## 2023-09-26 RX ORDER — MAGNESIUM SULFATE HEPTAHYDRATE 40 MG/ML
2 INJECTION, SOLUTION INTRAVENOUS ONCE
Status: COMPLETED | OUTPATIENT
Start: 2023-09-26 | End: 2023-09-26

## 2023-09-26 RX ORDER — CEFAZOLIN SODIUM/WATER 2 G/20 ML
SYRINGE (ML) INTRAVENOUS PRN
Status: DISCONTINUED | OUTPATIENT
Start: 2023-09-26 | End: 2023-09-26

## 2023-09-26 RX ADMIN — Medication 30 MG: at 16:16

## 2023-09-26 RX ADMIN — PANTOPRAZOLE SODIUM 40 MG: 40 INJECTION, POWDER, FOR SOLUTION INTRAVENOUS at 08:03

## 2023-09-26 RX ADMIN — PROPOFOL 60 MCG/KG/MIN: 10 INJECTION, EMULSION INTRAVENOUS at 00:21

## 2023-09-26 RX ADMIN — CALCIUM CHLORIDE 1 G: 100 INJECTION, SOLUTION INTRAVENOUS at 05:50

## 2023-09-26 RX ADMIN — NOREPINEPHRINE BITARTRATE 0.16 MCG/KG/MIN: 0.06 INJECTION, SOLUTION INTRAVENOUS at 22:21

## 2023-09-26 RX ADMIN — PROPOFOL 50 MCG/KG/MIN: 10 INJECTION, EMULSION INTRAVENOUS at 08:11

## 2023-09-26 RX ADMIN — SODIUM CHLORIDE, POTASSIUM CHLORIDE, SODIUM LACTATE AND CALCIUM CHLORIDE 1000 ML: 600; 310; 30; 20 INJECTION, SOLUTION INTRAVENOUS at 06:59

## 2023-09-26 RX ADMIN — ALBUMIN (HUMAN): 12.5 SOLUTION INTRAVENOUS at 17:36

## 2023-09-26 RX ADMIN — SODIUM CHLORIDE, POTASSIUM CHLORIDE, SODIUM LACTATE AND CALCIUM CHLORIDE 1000 ML: 600; 310; 30; 20 INJECTION, SOLUTION INTRAVENOUS at 20:33

## 2023-09-26 RX ADMIN — Medication 2 G: at 16:11

## 2023-09-26 RX ADMIN — PROPOFOL 30 MCG/KG/MIN: 10 INJECTION, EMULSION INTRAVENOUS at 12:58

## 2023-09-26 RX ADMIN — PROPOFOL 60 MCG/KG/MIN: 10 INJECTION, EMULSION INTRAVENOUS at 04:15

## 2023-09-26 RX ADMIN — DEXTROSE AND SODIUM CHLORIDE: 5; 900 INJECTION, SOLUTION INTRAVENOUS at 04:33

## 2023-09-26 RX ADMIN — CHLORHEXIDINE GLUCONATE 0.12% ORAL RINSE 15 ML: 1.2 LIQUID ORAL at 21:05

## 2023-09-26 RX ADMIN — METRONIDAZOLE 500 MG: 5 INJECTION, SOLUTION INTRAVENOUS at 21:01

## 2023-09-26 RX ADMIN — CHLORHEXIDINE GLUCONATE 0.12% ORAL RINSE 15 ML: 1.2 LIQUID ORAL at 08:04

## 2023-09-26 RX ADMIN — NOREPINEPHRINE BITARTRATE 6.4 MCG: 1 INJECTION, SOLUTION, CONCENTRATE INTRAVENOUS at 16:16

## 2023-09-26 RX ADMIN — Medication 20 MG: at 17:03

## 2023-09-26 RX ADMIN — PHENYLEPHRINE HYDROCHLORIDE 100 MCG: 10 INJECTION INTRAVENOUS at 17:44

## 2023-09-26 RX ADMIN — PHENYLEPHRINE HYDROCHLORIDE 100 MCG: 10 INJECTION INTRAVENOUS at 17:47

## 2023-09-26 RX ADMIN — Medication 0.5 UNITS: at 17:49

## 2023-09-26 RX ADMIN — DEXTROSE AND SODIUM CHLORIDE: 5; 900 INJECTION, SOLUTION INTRAVENOUS at 11:42

## 2023-09-26 RX ADMIN — MAGNESIUM SULFATE IN WATER 2 G: 40 INJECTION, SOLUTION INTRAVENOUS at 09:37

## 2023-09-26 RX ADMIN — Medication 0.5 UNITS: at 17:14

## 2023-09-26 RX ADMIN — Medication 125 MCG/HR: at 13:27

## 2023-09-26 RX ADMIN — SODIUM CHLORIDE, SODIUM GLUCONATE, SODIUM ACETATE, POTASSIUM CHLORIDE AND MAGNESIUM CHLORIDE: 526; 502; 368; 37; 30 INJECTION, SOLUTION INTRAVENOUS at 16:07

## 2023-09-26 RX ADMIN — PROPOFOL 50 MCG/KG/MIN: 10 INJECTION, EMULSION INTRAVENOUS at 21:41

## 2023-09-26 RX ADMIN — Medication 20 MG: at 17:45

## 2023-09-26 RX ADMIN — Medication 10 MG: at 17:08

## 2023-09-26 RX ADMIN — FENTANYL CITRATE 50 MCG: 50 INJECTION, SOLUTION INTRAMUSCULAR; INTRAVENOUS at 19:22

## 2023-09-26 RX ADMIN — FENTANYL CITRATE 50 MCG: 50 INJECTION, SOLUTION INTRAMUSCULAR; INTRAVENOUS at 16:54

## 2023-09-26 RX ADMIN — METRONIDAZOLE 500 MG: 5 INJECTION, SOLUTION INTRAVENOUS at 08:04

## 2023-09-26 RX ADMIN — SODIUM CHLORIDE, POTASSIUM CHLORIDE, SODIUM LACTATE AND CALCIUM CHLORIDE 1000 ML: 600; 310; 30; 20 INJECTION, SOLUTION INTRAVENOUS at 08:35

## 2023-09-26 RX ADMIN — CEFEPIME HYDROCHLORIDE 2 G: 2 INJECTION, POWDER, FOR SOLUTION INTRAVENOUS at 22:29

## 2023-09-26 RX ADMIN — DEXTROSE AND SODIUM CHLORIDE: 5; 900 INJECTION, SOLUTION INTRAVENOUS at 20:33

## 2023-09-26 RX ADMIN — SUGAMMADEX 200 MG: 100 INJECTION, SOLUTION INTRAVENOUS at 19:12

## 2023-09-26 RX ADMIN — NOREPINEPHRINE BITARTRATE 6.4 MCG: 1 INJECTION, SOLUTION, CONCENTRATE INTRAVENOUS at 17:10

## 2023-09-26 RX ADMIN — SODIUM CHLORIDE, POTASSIUM CHLORIDE, SODIUM LACTATE AND CALCIUM CHLORIDE 1000 ML: 600; 310; 30; 20 INJECTION, SOLUTION INTRAVENOUS at 14:23

## 2023-09-26 ASSESSMENT — ACTIVITIES OF DAILY LIVING (ADL)
ADLS_ACUITY_SCORE: 39
ADLS_ACUITY_SCORE: 47
ADLS_ACUITY_SCORE: 39
ADLS_ACUITY_SCORE: 43
ADLS_ACUITY_SCORE: 47
ADLS_ACUITY_SCORE: 47
ADLS_ACUITY_SCORE: 39
ADLS_ACUITY_SCORE: 39

## 2023-09-26 NOTE — ANESTHESIA POSTPROCEDURE EVALUATION
Patient: Alfredo Burnham    Procedure: Procedure(s):  abdominal washout, combined, repacking,  abthera placement  Laparotomy exploratory  SFA, popliteal, and tibial thromboembolectomy and four compartment fasciotomy       Anesthesia Type:  General    Note:  Disposition: ICU   Postop Pain Control: Uneventful            Sign Out: Well controlled pain   PONV: No   Neuro/Psych: Uneventful            Sign Out: PLANNED postop sedation   Airway/Respiratory: Uneventful            Sign Out: AIRWAY IN SITU/Resp. Support               Airway in situ/Resp. Support: ETT                 Reason: Planned Pre-op   CV/Hemodynamics: Uneventful            Sign Out: Detailed CV status               Blood Pressure: Pressors   Other NRE: NONE   DID A NON-ROUTINE EVENT OCCUR? No           Last vitals:  Vitals:    09/25/23 2115 09/25/23 2126 09/25/23 2130   BP: 120/76     Pulse: 79 81 81   Resp: 18 18 18   Temp:  37.2  C (99  F)    SpO2:          Electronically Signed By: FILOMENA KWAN MD  September 25, 2023  9:39 PM

## 2023-09-26 NOTE — PLAN OF CARE
Patient in OR from 1605-end of shift.     Neuro: No neuro changes. RASS remains -4. Pt moving BUE and RLE occasionally.   CV: Sinus rhythm. Tmax= 99.1. SBP goal 100-140 and MAP goal >65 by titrating levo and two 1 L boluses of LR. LLE pulses remain nonpalpable. 1 unit of platelets transfused this morning.   Resp: CMV settings: 50%/500/18/8. Scant secretions.   GI/: NG to LIS with thin green output. Lu with minimal, yellow-brown UOP. No BM today.   Skin: No new skin concerns. LLE remains dusky and ACE wrapped. Abdominal wound vac w/ moderate amount of sang output.   Access: R PIV, L art line, R DL IJ w/ introducer  Gtts: Prop @ 30, fent @ 125, D5 NS @ 150    Plan: Wean pressors and vent settings as able. Notify team of any changes/concerns.

## 2023-09-26 NOTE — PROGRESS NOTES
SURGICAL ICU PROGRESS NOTE  09/26/2023        Date of Service (when I saw the patient): 09/26/2023    ASSESSMENT:  Alfredo Burnham is a 68 year old male who was admitted to the SICU on 9/24/2023 s/p open AAA repair. Patient has a history of HTN and previous TIA. He presented to the ED earlier today with right sided abdominal pain and was found to have a contained, ruptured AAA on CT. 30 min super-celiac clamp time. Prolonged intra- renal clamp. Now s/p flex sig showing rectal ischemia, abdominal washout showing a hemostatic AAA graft and no evidence of transmural colonic or small bowel ischemia, and an unsuccessful LLE embolectomy 9/25. Patient remains intubated with an open abdomen, receiving MTP. Increasing pressure support needs and worsening renal function today.        CHANGES and MAJOR THINGS TODAY:   - OR with vascular surgery  - LR boluses for pressure support and wean norepi  - Begin to wean sedation  - Decrease respiratory rate from 18 to 16  - Place PICC line    PLAN:    Neurological:  # Acute pain   # Agitation   # Encephalopathy   - Monitor neurological status. Delirium preventions and precautions  - Pain: fentanyl infusion @ 150                - Sedation plan: propofol infusion @ 60 --> begin to wean today (9/26)    Pulmonary:  # Post operative ventilatory support  # Acute hypoxic respiratory support   - VENT: AC-VC 50% FiO2, tidal volume 500, decrease RR to 16 (from 18), PEEP 8. Continue full vent support. PST when meets criteria.  Ventilatory bundle. HOB elevation   -Satting well overnight (95%-98%)  - Supplemental oxygen to keep saturation above 92%.    Cardiovascular:    # Shock-hypovolemic  # H/o HTN  # Contained AAA rupture s/p open repair  # Suspected emboli to LLE  - Monitor hemodynamic status.   - Goal MAP >65, ideally -140--> Levo being titrated, pressure support needs increasing.   - Wean pressors as able  - RLE wrapped in bear hugger with sheepskin boot in place  - OR with  vascular surgery today     GI/Nutrition:    # Open abdomen  # Protein calorie deficit malnutrition, risk of  # Post-operative melena  # Rectal ischemia  - NPO  - No indication for parenteral nutrition.  - PPI for ppx  - Zosyn for ppx of bacterial translocation  - Bloody, mucousy BM overnight 9/25  Appreciate GI recs 9/25:  -Continue NPO status   -Supportive cares for GI bleed:  -- Ensure two large bore IVs  -- Adequate volume resuscitation with IVF, pRBC  -- Maintain up to date type and screen.  -- Monitor Hgb closely. Transfuse for Hgb<7 (improved outcomes with restricted vs liberal threshold)  -- Monitor stool output.  Document color and quality.  -- Avoid NSAID    Fluids/Electrolytes:  - LR bolus for IV fluid hydration/resuscitation      Renal:   # Acute kidney injury  # Oliguria  - Monitor for ATN  - No indication for CRRT at this time  - Urine output  <15 mL/hr . Will continue to monitor intake and output.    Endocrine:  # Stress hyperglycemia, risk of  - Insulin infusion. Goal to keep BG< 180 for optimal wound healing     Infectious disease:   # filemon-operative prophylaxis   # bacterial translocation in setting of possibly ischemic bowel  - Cefepime/Flagyl for ppx for bacterial translocation    Hematology:    # Acute blood loss anemia  # Active resuscitation  - Goal hgb >8, fibrinogen >200, INR <1.5, plts >100   - Will continue active, balanced resuscitation. Has received 2 units PRBC, 2 FFP, and 1 cryo since OR.  - Trend coags and CBC q2hr in initial resuscitation period    Musculoskeletal:  # Weakness and deconditioning of critical illness, risk of  - Physical and occupational therapy consult when appropriate. Passive ROM at bedside with RN  - Flat bedrest, do not break bed    General Cares/Prophylaxis:    DVT Prophylaxis: Pneumatic Compression Devices  GI Prophylaxis: PPI  Restraints: Restraints for medical healing needed: YES    Lines/ tubes/ drains:  - ETT  - Lu  - Abthera  - PIV x3  - R IJ  - R radial  arterial line     RLE and right groin reinforced w/ strong tape     Disposition:  -  Surgical ICU    Patient seen, findings and plan discussed with surgical ICU fellow, Dr. Jackson, and attending, Dr. Escobedo.    Jenni Abby, MS4  Surgical ICU    ====================================  INTERVAL HISTORY:   9/25 had unsuccessful LLE embolization after repeated attempts, no L popliteal pulse following intervention. Flex sig 9/25 demonstrated rectal ischemia. Abdominal exploration following flex sig demonstrated no transmural ischemia and colorectal surgery determined there was no need for further intervention at this time.    Overnight levo requirements increased to maintain MAP >65 and AST, Cr, and GFR worsened significantly.    ROS unable to be performed due to sedation and intubation.    OBJECTIVE:   1. VITAL SIGNS:   Temp:  [97.2  F (36.2  C)-100.9  F (38.3  C)] 99.1  F (37.3  C)  Pulse:  [] 73  Resp:  [15-25] 18  BP: ()/() 120/76  MAP:  [50 mmHg-271 mmHg] 97 mmHg  Arterial Line BP: ()/() 141/75  FiO2 (%):  [50 %] 50 %  SpO2:  [95 %-100 %] 97 %  Vent Mode: CMV/AC  (Continuous Mandatory Ventilation/ Assist Control)  FiO2 (%): 50 %  Resp Rate (Set): 18 breaths/min  Tidal Volume (Set, mL): 500 mL  PEEP (cm H2O): 8 cmH2O  Resp: 18      2. INTAKE/ OUTPUT:   I/O last 3 completed shifts:  In: 90687.2 [I.V.:42536.2; Other:3360; IV Piggyback:1000]  Out: 72822 [Urine:884; Emesis/NG output:1200; Drains:2800; Blood:8900]    3. PHYSICAL EXAMINATION:  General: Intubated, sedated  HEENT: Normocephalic, atraumatic. NGT to LIS, ETT  Neuro: Sedated  Pulm/Resp: Intubated  CV: RRR  Abdomen: Open abdomen with Abthera in place, serosanguinous output, soft, moderately distended  :  aparicio catheter in place, urine yellow and clear  MSK/Extremities: LLE popliteal with doppler signal, no palpable pulse. Right palpable pedal signal, R pinky toe improved coloration. L lower leg and foot mottled, cold.    4.  INVESTIGATIONS:   Arterial Blood Gases   Recent Labs   Lab 09/25/23  1149 09/25/23  0805 09/25/23  0344   PH 7.48* 7.56* 7.36   PCO2 36 19* 28*   PO2 163* 186* 162*   HCO3 27 17* 16*     Complete Blood Count   Recent Labs   Lab 09/26/23  0352 09/25/23 2250 09/25/23 1942 09/25/23  1150   WBC 6.0 4.8 5.5 4.5   HGB 11.8* 11.8* 13.2* 14.3   PLT 85* 95* 68* 102*     Basic Metabolic Panel  Recent Labs   Lab 09/26/23  0406 09/26/23  0352 09/26/23  0200 09/26/23  0111 09/25/23  2326 09/25/23 2250 09/25/23 1943 09/25/23  1942 09/25/23  1156 09/25/23  1150   NA  --  138  --   --   --  137  --  138  --  138   POTASSIUM  --  4.6  --   --   --  5.1  --  5.9*  --  5.5*  5.5*   CHLORIDE  --  104  --   --   --  104  --  105  --  104   CO2  --  21*  --   --   --  21*  --  22  --  21*   BUN  --  27.4*  --   --   --  26.0*  --  23.5*  --  21.5   CR  --  2.93*  --   --   --  2.58*  --  2.40*  --  1.87*   GLC 79 107* 106* 125*   < > 118*   < > 115*   < > 115*    < > = values in this interval not displayed.     Liver Function Tests  Recent Labs   Lab 09/26/23  0352 09/25/23 2250 09/25/23 1942 09/25/23  1150 09/25/23  0604 09/25/23  0344 09/25/23  0145 09/24/23  1644   AST 1,012* 1,017*  --   --   --  318*  --  34   * 295* 312* 327*  --  199*  --  27   ALKPHOS 39* 37* 36* 43  --  42  --  76   BILITOTAL 1.1 0.8 0.7 0.8  --  0.5  --  0.8   ALBUMIN 2.3* 2.4* 2.6* 2.5*  --  2.3*  --  4.6   INR 1.46* 1.43* 1.72* 1.42*   < >  --    < >  --     < > = values in this interval not displayed.     Pancreatic Enzymes  Recent Labs   Lab 09/24/23  1644   LIPASE 21     Coagulation Profile  Recent Labs   Lab 09/26/23  0352 09/25/23  2250 09/25/23  1942 09/25/23  1150   INR 1.46* 1.43* 1.72* 1.42*   PTT 35 35 39* 35         5. RADIOLOGY:   Recent Results (from the past 24 hour(s))   Echocardiogram Complete   Result Value    LVEF  >70%    Narrative    [id number]  [id number]  [id number]  [id number]^ELISSA^MARYSE     [facility]  [facility]  Echocardiography Laboratory  [address] [place] [id number]     Name: NEWTON BUCKLEY  MRN: [id number]  : 1955  Study Date: 2023 08:04 AM  Age: 68 yrs  Gender: Male  Patient Location: On license of [facility]  Reason For Study: Shock  Ordering Physician: MARYSE BOWERS  Performed By: Liz Everett RDCS     BSA: 1.8 m2  Height: 68 in  Weight: 158 lb  BP: 115/100 mmHg  ______________________________________________________________________________  Procedure  Echocardiogram with two-dimensional, color and spectral Doppler performed.  Contrast Optison. Technically difficult study.Extremely poor acoustic windows.  Limited information was obtained during study. Optison (NDC #8577-9377-05)  given intravenously. Patient was given 5 ml mixture of 3 ml Optison and 6 ml  saline. 4 ml wasted.  ______________________________________________________________________________  Interpretation Summary  The visual ejection fraction is >70%. Regional wall motion is probably normal.  No significant valvular abnormalities present.  Trivial pericardial effusion is present.     There is no prior study for direct comparison.  Technically difficult study.Extremely poor acoustic windows. Limited  information was obtained during study.  ______________________________________________________________________________  Left Ventricle  Left ventricular size is normal. The visual ejection fraction is >70%. The  Ejection Fraction was visually estimated. Regional wall motion is probably  normal.     Right Ventricle  Right ventricular function cannot be assessed due to poor image quality.     Atria  The atria cannot be assessed.     Mitral Valve  On Doppler interrogation, there is no significant stenosis or regurgitation.     Aortic Valve  The valve leaflets are not well visualized. On Doppler interrogation, there is  no significant stenosis or regurgitation.     Tricuspid Valve  On Doppler interrogation, there is no  significant stenosis or regurgitation.     Pulmonic Valve  The pulmonic valve cannot be assessed.     Vessels  The aorta root cannot be assessed. Unable to assess mean RA pressure given the  patient is on a ventilator.     Pericardium  Trivial pericardial effusion is present. Chamber compression is not present;  there is no evidence for tamponade.     Miscellaneous  No significant valvular abnormalities present.     Compared to Previous Study  There is no prior study for direct comparison.     Attestation  I have personally viewed the imaging and agree with the interpretation and  report as documented by the fellow, Mikhail Corrales, and/or edited by me.  ______________________________________________________________________________  Doppler Measurements & Calculations  MV E max leonidas: 69.2 cm/sec  MV A max leonidas: 60.3 cm/sec  MV E/A: 1.1  MV dec slope: 727.6 cm/sec2  MV dec time: 0.10 sec     ______________________________________________________________________________  Report approved by: Ruperto BREAUX 09/25/2023 10:35 AM             =========================================  =========================================  I saw and evaluated the patient in an independent visit and agree with the student's assessment and plan as written above. I was present at all times for any mentioned procedures.       Clint Braga MD

## 2023-09-26 NOTE — ANESTHESIA PREPROCEDURE EVALUATION
Anesthesia Pre-Procedure Evaluation    Patient: Alfredo Burnham   MRN: [id number] : 1955        Procedure : Procedure(s):  Abdominal washout, possible abdominal closure, possible irrigation and debridement left lower extremity wound          Past Medical History:   Diagnosis Date    Hypertension 2011    Macular degeneration       No past surgical history on file.   Allergies   Allergen Reactions    Penicillins Swelling     Facial swelling      Social History     Tobacco Use    Smoking status: Every Day     Packs/day: 0.50     Types: Cigarettes    Smokeless tobacco: Not on file   Substance Use Topics    Alcohol use: Yes     Comment: 1-2/wk      Wt Readings from Last 1 Encounters:   23 83.2 kg (183 lb 6.8 oz)        Anesthesia Evaluation   Pt has not had prior anesthetic         ROS/MED HX  ENT/Pulmonary: Comment: The patient is currently on mechanical ventilation.    (+)     RIGO risk factors,  hypertension,         tobacco use, Current use,                      Neurologic:     (+)              TIA,                  Cardiovascular:     (+)  hypertension- -   -  - -                                      METS/Exercise Tolerance:     Hematologic: Comments: Thrombocytopenia      Musculoskeletal:       GI/Hepatic:     (+) GERD,                   Renal/Genitourinary:  - neg Renal ROS  (-) renal disease   Endo:  - neg endo ROS  (-) Type II DM   Psychiatric/Substance Use:     (+)   alcohol abuse      Infectious Disease:  - neg infectious disease ROS     Malignancy:       Other:            Physical Exam    Airway  airway exam normal    Comment: The patient is intubated and on mechanical ventilation.    Mallampati: I   TM distance: > 3 FB   Neck ROM: full   Mouth opening: > 3 cm    Respiratory Devices and Support         Dental       (+) Minor Abnormalities - some fillings, tiny chips      Cardiovascular   cardiovascular exam normal       Rhythm and rate: regular and normal     Pulmonary   pulmonary exam  normal        breath sounds clear to auscultation           OUTSIDE LABS:  CBC:   Lab Results   Component Value Date    WBC 5.5 09/25/2023    WBC 4.5 09/25/2023    HGB 13.2 (L) 09/25/2023    HGB 14.3 09/25/2023    HCT 36.5 (L) 09/25/2023    HCT 39.3 (L) 09/25/2023    PLT 68 (L) 09/25/2023     (L) 09/25/2023     BMP:   Lab Results   Component Value Date     09/25/2023     09/25/2023    POTASSIUM 5.9 (H) 09/25/2023    POTASSIUM 5.5 (H) 09/25/2023    POTASSIUM 5.5 (H) 09/25/2023    CHLORIDE 105 09/25/2023    CHLORIDE 104 09/25/2023    CO2 22 09/25/2023    CO2 21 (L) 09/25/2023    BUN 23.5 (H) 09/25/2023    BUN 21.5 09/25/2023    CR 2.40 (H) 09/25/2023    CR 1.87 (H) 09/25/2023    GLC 72 09/25/2023    GLC 90 09/25/2023     COAGS:   Lab Results   Component Value Date    PTT 39 (H) 09/25/2023    INR 1.72 (H) 09/25/2023    FIBR 216 09/25/2023     POC: No results found for: BGM, HCG, HCGS  HEPATIC:   Lab Results   Component Value Date    ALBUMIN 2.6 (L) 09/25/2023    PROTTOTAL 4.1 (L) 09/25/2023     (H) 09/25/2023    AST  09/25/2023      Comment:      Unsatisfactory specimen - hemolyzed  Reference intervals for this test were updated on 6/12/2023 to more accurately reflect our healthy population. There may be differences in the flagging of prior results with similar values performed with this method. Interpretation of those prior results can be made in the context of the updated reference intervals.    ALKPHOS 36 (L) 09/25/2023    BILITOTAL 0.7 09/25/2023     OTHER:   Lab Results   Component Value Date    PH 7.48 (H) 09/25/2023    LACT 2.2 (H) 09/25/2023    A1C 5.7 (H) 09/25/2023    OMAR 8.5 (L) 09/25/2023    PHOS 5.0 (H) 09/25/2023    MAG 2.2 09/25/2023    LIPASE 21 09/24/2023    TSH 2.978 02/24/2009       Anesthesia Plan    ASA Status:  4, emergent       Anesthesia Type: General.   Induction: Inhalation.   Maintenance: Inhalation.        Consents    Anesthesia Plan(s) and associated risks,  benefits, and realistic alternatives discussed. Questions answered and patient/representative(s) expressed understanding.     - Discussed: Risks, Benefits and Alternatives for BOTH SEDATION and the PROCEDURE were discussed     - Discussed with:  Patient      - Extended Intubation/Ventilatory Support Discussed: Yes.      - Patient is DNR/DNI Status: No     Use of blood products discussed: Yes.     - Discussed with: Patient.     Postoperative Care    Pain management: IV analgesics.   PONV prophylaxis: Ondansetron (or other 5HT-3), Dexamethasone or Solumedrol     Comments:    Other Comments: The patient is well known to me.  I provided Anesthesia care to him yesterday.  He needs on going Surgical, and Anesthesia care.  His family is aware of the material risks, benefits, and alternative.  There are no other complaints at this time.            Genaro Nicole MD

## 2023-09-26 NOTE — PROGRESS NOTES
Major Shift Events:  RAAS -4. Pupils are pinpoint and sluggish. Sedated with Propofol @ 50 and Fentanyl @ 150. Normal sinus rhythm. No ectopy noted. T. Max 99.1. Levo titrated for MAP >65 and systolic between 100-140. Left radial arterial line placed due to the right radial art line being so positional. No pulse on LLE. CMV settings: 50%/500/18/8. NG to LIS with green bile output. Lu had extremely low UO, team aware. Multiple bloody/loose stools. Abdomen covered with wound vac. Extremities are cold and mottled. Scattered bruising. Right PIV x1, left radial arterial line, and right double lumen internal jugular with introducer. Prop @ 50, fentanyl @ 150, levo @ 0.08, and D5 NS @ 150. Two units of platelets and one unit of plasma given. 50 mL D50 given for hypoglycemia, insulin gtt off.     Plan: OR today? Continue with POC and notify provider with any updates.    For vital signs and complete assessments, please see documentation flowsheets.     Zofia Barrios RN on 9/26/2023 at 6:41 AM

## 2023-09-26 NOTE — PROCEDURES
BEDSIDE PROCEDURE - ARTERIAL LINE    Date of Procedure: 09/25/2023     Patient: Alfredo Burnham   MRN: [id number]     RESIDENT: Zeina Tillman MD, PharmD    FELLOW: Mason Bustillos MD    SURGEON: Abbi Vergara MD      SEDATION: propofol infusion      ESTIMATED BLOOD LOSS: <5ml       COMPLICATIONS: None.        INDICATIONS FOR PROCEDURE: Hemodynamic monitoring    DESCRIPTION OF PROCEDURE:  A time-out was completed verifying correct patient, procedure, site, positioning, and special equipment if applicable. The patient s left wrist was prepped and draped in sterile fashion. An 18g needle was introduced into the radial artery. A guide wire was easily introduced through the needle. Needle was removed. The catheter was threaded over the guide wire and the wire was removed with appropriate pulsatile blood return. It was connected to the transducer and a good waveform was confirmed. The catheter was then sutured in place to the skin and a sterile dressing applied. Dr. Vergara was immediately available for the entire procedure.  - - - - - - - - - - - - - - - - - -  Zeina Tillman MD, PharmD  General Surgery, PGY2  Pager [phone]    See [facility] for on-call pager information.

## 2023-09-26 NOTE — ANESTHESIA CARE TRANSFER NOTE
Patient: Alfredo Burnham    Procedure: Procedure(s):  abdominal washout, combined, repacking,  abthera placement  Laparotomy exploratory  SFA, popliteal, and tibial thromboembolectomy and four compartment fasciotomy       Diagnosis: Ruptured abdominal aortic aneurysm (AAA), unspecified part (H) [I71.30]  Diagnosis Additional Information: No value filed.    Anesthesia Type:   General     Note:    Oropharynx: endotracheal tube in place and ventilatory support  Level of Consciousness: iatrogenic sedation  Patient oxygen source: AMBU.    Independent Airway: airway patency not satisfactory and stable  Dentition: dentition unchanged  Vital Signs Stable: post-procedure vital signs reviewed and stable  Report to RN Given: handoff report given  Patient transferred to: ICU  Comments: Transport back to . Placed on vent on arrival.  ICU Handoff: Call for PAUSE to initiate/utilize ICU HANDOFF, Identified Patient, Identified Responsible Provider, Reviewed the Pertinent Medical History, Discussed Surgical Course, Reviewed Intra-OP Anesthesia Management and Issues during Anesthesia, Set Expectations for Post Procedure Period and Allowed Opportunity for Questions and Acknowledgement of Understanding  Vitals:  Vitals Value Taken Time   BP     Temp     Pulse 88 09/25/23 1932   Resp 20 09/25/23 1932   SpO2 95 % 09/25/23 1932   Vitals shown include unvalidated device data.    Electronically Signed By: ALTAGRACIA Emery CRNA  September 25, 2023  7:32 PM

## 2023-09-26 NOTE — PROGRESS NOTES
Vascular Surgery Progress Note  09/26/2023       Subjective:  Received PLTsx2 overnight, rising serum creatinine, continues to make urine about 20-40ml/hr. Levo drip for hemodynamic support, moving upper extremities spontaneously. LLE mottled and cold.      Objective:  Temp:  [98.4  F (36.9  C)-100.9  F (38.3  C)] 98.4  F (36.9  C)  Pulse:  [] 75  Resp:  [15-22] 18  BP: ()/() 120/76  MAP:  [50 mmHg-271 mmHg] 92 mmHg  Arterial Line BP: ()/() 129/71  FiO2 (%):  [50 %] 50 %  SpO2:  [94 %-100 %] 100 %    I/O last 3 completed shifts:  In: 7857.33 [I.V.:5859.33; IV Piggyback:1000]  Out: 4152 [Urine:402; Emesis/NG output:1550; Drains:2150; Blood:50]      Gen: intubated, sedated, moves upper extremities spontaneously  CV: RR per radial pulse, off pressors, sinus tach per tele  Resp: intubated, on ventilator  Abd: wound vac in place, holding seal, abdomen somewhat firm on left side, unable to assess tenderness due to patient sedation status.    Ext: RLE wwp, mulitphasic DP and PT signal. LLE cool to touch,somewhat dusky. no DP/PT signals, multiphasic popliteal signal. Compartments soft, compressible.     UOP: 20-40cc/hr   Temp abd closure output: ~ 200cc/hr       Labs:  Recent Labs   Lab 09/26/23  0741 09/26/23  0352 09/25/23  2250   WBC 5.2 6.0 4.8   HGB 10.4* 11.8* 11.8*   PLT 85* 85* 95*       Recent Labs   Lab 09/26/23  0759 09/26/23  0741 09/26/23  0406 09/26/23  0352 09/25/23  2326 09/25/23  2250   NA  --  138  --  138  --  137   POTASSIUM  --  4.5  --  4.6  --  5.1   CHLORIDE  --  106  --  104  --  104   CO2  --  20*  --  21*  --  21*   BUN  --  27.4*  --  27.4*  --  26.0*   CR  --  3.04*  --  2.93*  --  2.58*   * 130* 79 107*   < > 118*   OMAR  --  7.8*  --  7.7*  --  8.3*   MAG  --  1.9  --  2.1  --  2.1   PHOS  --  3.7  --  4.0  --  4.4    < > = values in this interval not displayed.       Imaging:  XR Chest Port 1 View    Result Date: 9/25/2023  EXAM: XR CHEST PORT 1 VIEW   9/25/2023 4:00 AM HISTORY:  Endotracheal tube positioning   COMPARISON:  None TECHNIQUE: Portable AP supine radiograph of chest FINDINGS: Endotracheal tube tip is in the mid thoracic trachea. Right IJ CVC tip projects over the cavoatrial junction.. Enteric tube projects over the stomach. The trachea is midline. The cardiomediastinal silhouette is within normal limits. The pulmonary vasculature is distinct. No appreciable pneumothorax or pleural effusion. No focal airspace consolidation. No acute osseous abnormality.     IMPRESSION: 1. Endotracheal tube tip is in the mid thoracic trachea. 2. No acute airspace disease. Streaky perihilar opacities likely represent atelectasis/edema. I have personally reviewed the examination and initial interpretation and I agree with the findings. ILEANA SIEGEL MD   SYSTEM ID:  [id number]    XR Abdomen Port 1 View    Result Date: 9/25/2023  Exam: XR ABDOMEN PORT 1 VIEW, 9/25/2023 4:12 AM Indication: NGT in place Comparison: None Findings: Portable AP supine radiograph of the abdomen. Enteric tube tip and sidehole project over the stomach. Partially visualized central venous catheter tip projecting over the right atrium. Surgical packing material projects over the abdomen. Multiple surgical clips projecting over the abdomen. Nonobstructive bowel gas pattern. No pneumatosis or portal venous gas.     Impression: 1. Gastric tube tip and sidehole project over the stomach. Packing material present in the abdomen. 2. Nonobstructive bowel gas pattern. I have personally reviewed the examination and initial interpretation and I agree with the findings. ILEANA SIEGEL MD   SYSTEM ID:  [id number]    ADDIS with Report    Result Date: 9/25/2023  Mason Foss MD     9/25/2023  7:53 AM Perioperative ADDIS Procedure Note Staff -      Anesthesiologist:  Mason Foss MD      Performed By: anesthesiologist Preanesthesia Checklist:  Patient identified, IV assessed, risks and benefits discussed, monitors and  equipment assessed, procedure being performed at surgeon's request and anesthesia consent obtained. ADDIS Probe Insertion Probe Number: Loaner 1 Probe Status PRE Insertion: NO obvious damage Probe type:  Adult 3D Bite block used:   Oral Airway Insertion Technique: Recurrent attempts, Laryngoscopy Insertion complications: None obvious Billing Report:A ADDIS report is NOT being generated. Probe Status POST Removal: NO obvious damage        Assessment/Plan:   68 year old male with PMH of HTN and previous TIA who presented with R sided abdominal pain found to have contained ruptured AAA, now s/p open AAA repair 9/24 into 9/25am with 30 min supraceliac clamp time, prolonged infrarenal clamp time, temporary abdominal closure. He remains critically ill in the ICU, although with some improvements in his lactic acidosis, he had bloody stool 9/25 am with flex sig 9/25/23, ongoing abssent signals in his LLE concerning for ischemia. We will continue to monitor LLE given patient overall clinical status is not stable enough for further intervention. Plan for continued resuscitation today in the ICU as well as RTOR this afternoon 3-4pm for washout and replacement wound vac on abdomen.     - RTOR today 3-4pm for abd exploration, washout, replacement temporary abdominal closure.   - GI following given bloody stools   - Please consider additional volume resuscitation, likely under resuscitated based on PPV easily seen on tele today, recommend ongoing boluses/resuscitation with blood products as needed to maintain volume status. Consider transducing CVP.   - Continue with bowel rest and NPO   - Broad spectrum abx to cover for gram negatives in setting of possible bowel ischemia  - Monitor renal function and electrolytes, serum creatinine at 3.04 this morning up from 2.5 yesterday   - Pain/sedation medication per ICU   - Continue vent support, wean as able  - Wean pressors as tolerated  - Continue warming LLE  - Goal MAP >65  - Recommend  ongoing resuscitation, patient appears to be fluid down still based on   - Continue insulin gtt as needed  - Continue aparicio     Discussed with vascular surgery staff, Dr. Kandace Mcleod who is in agreement with the above.    Miryam Carrasco CNP  Vascular Surgery  Pager: [phone]  [email or web address]  Send message or 10 digit call back number Securely via Bandwidth with the Bandwidth Web Console (learn more here)

## 2023-09-26 NOTE — PROGRESS NOTES
Patient seen and examined with Dr. Donald, my senior vascular surgery resident, and Miryam Carrasco, my vascular surgery SANDY.  I corroborated the history and reperformed physical examination.  Agree with findings as documented in their note with the exception as documented below.    Mr. Burnham is postoperative day 1 from repair of ruptured pararenal aneurysm with [id number] bypass performed in the early hours of Monday morning.  At the end of the operation he is incredibly coagulopathic and expectedly hypotensive.  He was taken for a planned staged return to the OR last evening with my partner Dr. Boucher, and Dr. Shirley from colon and rectal surgery for exchange of the abdominal packing, evaluation of the colon, replacement of the ABThera, and left lower extremity embolectomy.  The latter most unfortunately did not restore adequate perfusion, I suspect due to no reflow phenomenon.  The colon was found to be viable.  The coagulopathy in the retroperitoneum had substantially improved.  He was repacked with plans for returning to the operating room today for closure of the retroperitoneum and washout.    Overnight he has required varying doses of vasopressors.  His urine output has begun to tail off expectedly with his acute kidney injury expectedly in the setting of acute kidney injury.  He has had some intermittent continued mucous sloughing from the colon but no overt changes concerning for worsening bowel ischemia.  I do note on exam that his urine is quite dark, he has profound respiratory variation on his A-line, and is responsive with recruitment maneuver.  I think he is still volume seeking and intravascularly hypovolemic.  He is also very early postoperatively and I would favor continued aggressive fluid resuscitation particularly given the losses from the wound VAC in his abdomen and the nature of the large retroperitoneal surgery, particularly for ruptured aneurysm.  His right leg has multiphasic  signals and in fact, a palpable dorsalis pedis pulse.  The left leg is still cool.  There is some slight improvement in the duskiness however still no signal in the foot.  I was able to discuss the patient's care with the critical care team this morning and critical care consultant this afternoon.    I was able to visit with the family again and update them on the situation.  He still remains critically ill.  I discussed that his hemodynamics and coagulopathy have improved but he still has several main issues which are acute kidney injury and potential renal failure, low-grade bowel ischemia that we will monitor closely, and his left lower extremity ischemia.  I am not optimistic that he will avoid amputation, however as long as he is not having signs of infection or worsening physiology from the leg I would prefer not to perform an emergent amputation in his condition as well as discussed this with the patient.  The family had very insightful questions today about prognosis, and next steps including our operative plans for today.  These were all answered to the best my ability, knowing that there is still a lot of unpredictability in his current situation.    Boris JeronimoHarsha  Vascular and Endovascular Surgery

## 2023-09-26 NOTE — PROGRESS NOTES
Colorectal Surgery Progress Note  [facility]  POD#2      Subjective:  intubated and sedated.  Per nursing, continues to have bloody mucus like rectal discharge.     Vitals:  Vitals:    09/26/23 1130 09/26/23 1145 09/26/23 1200 09/26/23 1215   BP:       BP Location:       Pulse: 72 72 74 72   Resp: 16 16 16 16   Temp:   99.1  F (37.3  C)    TempSrc:   Esophageal    SpO2: 96% 97% 98% 98%   Weight:       Height:         I/O:  I/O last 3 completed shifts:  In: 7857.33 [I.V.:5859.33; IV Piggyback:1000]  Out: 4152 [Urine:402; Emesis/NG output:1550; Drains:2150; Blood:50]    Physical Exam:  Gen: Intubated & sedated.  Withdraws from abdominal palpation.   Pulm: Mechanically vented  Abd: Abd with abethera in place    BMP  Recent Labs   Lab 09/26/23  1139 09/26/23  1136 09/26/23  0959 09/26/23  0759 09/26/23  0741 09/26/23  0406 09/26/23  0352 09/25/23  2326 09/25/23  2250   NA  --  138  --   --  138  --  138  --  137   POTASSIUM  --  4.4  --   --  4.5  --  4.6  --  5.1   CHLORIDE  --  106  --   --  106  --  104  --  104   CO2  --  21*  --   --  20*  --  21*  --  21*   BUN  --  27.5*  --   --  27.4*  --  27.4*  --  26.0*   CR  --  3.19*  --   --  3.04*  --  2.93*  --  2.58*   * 133* 99 140* 130*   < > 107*   < > 118*   MAG  --  2.3  --   --  1.9  --  2.1  --  2.1   PHOS  --  4.1  --   --  3.7  --  4.0  --  4.4    < > = values in this interval not displayed.     CBC  Recent Labs   Lab 09/26/23  1136 09/26/23  0741 09/26/23  0352 09/25/23  2250   WBC 5.2 5.2 6.0 4.8   HGB 10.0* 10.4* 11.8* 11.8*   HCT 28.5* 28.9* 32.4* 33.1*   * 85* 85* 95*       Fentanyl, propofol.   norepinephrine    ASSESSMENT: This is a 68 year old male PH HTN & TIA. Presented w/ right sided abd pain, found to have contained & ruptured AAA.  S/p open AAA repair w/ 30 min super-celiac clamp time & prolonged intra-renal clamp on 9/24.  Flex sig w/ rectal ischemia.  RTOR on 9/25 w/ abd washout, abthera, - no  transmural ischemia of rectum, colon, small bowel.      - defer management to primary providers  - appreciate continued strict in and outs  - NGT in place  - At this time, do not insert anything into rectum.  Avoid rectal tubes.    - CRS available as needed for re-evaluation/possible resection    DAMARI Cuenca-BRIAN ..................9/26/2023   1:27 PM  Colon and Rectal Surgery    Patient was seen and discussed with Dr. Yang    The above plan of care was performed and communicated to me by Dr. Shirley    I spent 30 minute face-to-face or coordinating care of Alfredo Burnham. Over 50% of our time on the unit was spent counseling the patient and/or coordinating care as documented in the assessment and plan.     [id number] post op hospital visit

## 2023-09-26 NOTE — OR NURSING
Removed wound vac dressing in the OR and sponges were intact. 3 Quik clots were removed from the abdomen which was consistent with what was documented the last time the patient was in the OR as packing.   New Abthera dressing placed and nothing packed in the abdomen

## 2023-09-27 LAB
ALBUMIN SERPL BCG-MCNC: 1.5 G/DL (ref 3.5–5.2)
ALBUMIN SERPL BCG-MCNC: 1.5 G/DL (ref 3.5–5.2)
ALBUMIN SERPL BCG-MCNC: 1.7 G/DL (ref 3.5–5.2)
ALBUMIN SERPL BCG-MCNC: 1.8 G/DL (ref 3.5–5.2)
ALLEN'S TEST: ABNORMAL
ALP SERPL-CCNC: 45 U/L (ref 40–129)
ALP SERPL-CCNC: 48 U/L (ref 40–129)
ALP SERPL-CCNC: 54 U/L (ref 40–129)
ALP SERPL-CCNC: 58 U/L (ref 40–129)
ALP SERPL-CCNC: 61 U/L (ref 40–129)
ALP SERPL-CCNC: 64 U/L (ref 40–129)
ALT SERPL W P-5'-P-CCNC: 209 U/L (ref 0–70)
ALT SERPL W P-5'-P-CCNC: 213 U/L (ref 0–70)
ALT SERPL W P-5'-P-CCNC: 226 U/L (ref 0–70)
ALT SERPL W P-5'-P-CCNC: 229 U/L (ref 0–70)
ALT SERPL W P-5'-P-CCNC: 258 U/L (ref 0–70)
ALT SERPL W P-5'-P-CCNC: 266 U/L (ref 0–70)
ANION GAP SERPL CALCULATED.3IONS-SCNC: 10 MMOL/L (ref 7–15)
ANION GAP SERPL CALCULATED.3IONS-SCNC: 11 MMOL/L (ref 7–15)
ANION GAP SERPL CALCULATED.3IONS-SCNC: 12 MMOL/L (ref 7–15)
APTT PPP: 38 SECONDS (ref 22–38)
APTT PPP: 38 SECONDS (ref 22–38)
APTT PPP: 39 SECONDS (ref 22–38)
APTT PPP: 39 SECONDS (ref 22–38)
APTT PPP: 41 SECONDS (ref 22–38)
APTT PPP: 42 SECONDS (ref 22–38)
AST SERPL W P-5'-P-CCNC: 704 U/L (ref 0–45)
AST SERPL W P-5'-P-CCNC: 754 U/L (ref 0–45)
AST SERPL W P-5'-P-CCNC: 759 U/L (ref 0–45)
AST SERPL W P-5'-P-CCNC: 787 U/L (ref 0–45)
AST SERPL W P-5'-P-CCNC: 864 U/L (ref 0–45)
AST SERPL W P-5'-P-CCNC: 936 U/L (ref 0–45)
BASE EXCESS BLDA CALC-SCNC: -5.7 MMOL/L (ref -9–1.8)
BASE EXCESS BLDA CALC-SCNC: -6.7 MMOL/L (ref -9–1.8)
BASE EXCESS BLDA CALC-SCNC: -7.2 MMOL/L (ref -9–1.8)
BILIRUB SERPL-MCNC: 1.3 MG/DL
BILIRUB SERPL-MCNC: 1.5 MG/DL
BILIRUB SERPL-MCNC: 1.6 MG/DL
BLD PROD TYP BPU: NORMAL
BLD PROD TYP BPU: NORMAL
BLOOD COMPONENT TYPE: NORMAL
BLOOD COMPONENT TYPE: NORMAL
BUN SERPL-MCNC: 27.8 MG/DL (ref 8–23)
BUN SERPL-MCNC: 27.9 MG/DL (ref 8–23)
BUN SERPL-MCNC: 28.2 MG/DL (ref 8–23)
BUN SERPL-MCNC: 28.7 MG/DL (ref 8–23)
BUN SERPL-MCNC: 28.9 MG/DL (ref 8–23)
BUN SERPL-MCNC: 29.4 MG/DL (ref 8–23)
CA-I BLD-MCNC: 3.9 MG/DL (ref 4.4–5.2)
CA-I BLD-MCNC: 4 MG/DL (ref 4.4–5.2)
CA-I BLD-MCNC: 4.1 MG/DL (ref 4.4–5.2)
CALCIUM SERPL-MCNC: 6.2 MG/DL (ref 8.8–10.2)
CALCIUM SERPL-MCNC: 6.2 MG/DL (ref 8.8–10.2)
CALCIUM SERPL-MCNC: 6.3 MG/DL (ref 8.8–10.2)
CALCIUM SERPL-MCNC: 6.4 MG/DL (ref 8.8–10.2)
CALCIUM SERPL-MCNC: 6.4 MG/DL (ref 8.8–10.2)
CALCIUM SERPL-MCNC: 6.7 MG/DL (ref 8.8–10.2)
CHLORIDE SERPL-SCNC: 108 MMOL/L (ref 98–107)
CHLORIDE SERPL-SCNC: 110 MMOL/L (ref 98–107)
CHLORIDE SERPL-SCNC: 110 MMOL/L (ref 98–107)
CHLORIDE SERPL-SCNC: 111 MMOL/L (ref 98–107)
CHLORIDE SERPL-SCNC: 111 MMOL/L (ref 98–107)
CHLORIDE SERPL-SCNC: 112 MMOL/L (ref 98–107)
CODING SYSTEM: NORMAL
CODING SYSTEM: NORMAL
CREAT SERPL-MCNC: 3.5 MG/DL (ref 0.67–1.17)
CREAT SERPL-MCNC: 3.76 MG/DL (ref 0.67–1.17)
CREAT SERPL-MCNC: 3.94 MG/DL (ref 0.67–1.17)
CREAT SERPL-MCNC: 3.97 MG/DL (ref 0.67–1.17)
CREAT SERPL-MCNC: 4.09 MG/DL (ref 0.67–1.17)
CREAT SERPL-MCNC: 4.15 MG/DL (ref 0.67–1.17)
DEPRECATED HCO3 PLAS-SCNC: 17 MMOL/L (ref 22–29)
DEPRECATED HCO3 PLAS-SCNC: 18 MMOL/L (ref 22–29)
DEPRECATED HCO3 PLAS-SCNC: 19 MMOL/L (ref 22–29)
EGFRCR SERPLBLD CKD-EPI 2021: 15 ML/MIN/1.73M2
EGFRCR SERPLBLD CKD-EPI 2021: 16 ML/MIN/1.73M2
EGFRCR SERPLBLD CKD-EPI 2021: 17 ML/MIN/1.73M2
EGFRCR SERPLBLD CKD-EPI 2021: 18 ML/MIN/1.73M2
ERYTHROCYTE [DISTWIDTH] IN BLOOD BY AUTOMATED COUNT: 17.9 % (ref 10–15)
ERYTHROCYTE [DISTWIDTH] IN BLOOD BY AUTOMATED COUNT: 17.9 % (ref 10–15)
ERYTHROCYTE [DISTWIDTH] IN BLOOD BY AUTOMATED COUNT: 18 % (ref 10–15)
ERYTHROCYTE [DISTWIDTH] IN BLOOD BY AUTOMATED COUNT: 18.1 % (ref 10–15)
FIBRINOGEN PPP-MCNC: 371 MG/DL (ref 170–490)
FIBRINOGEN PPP-MCNC: 379 MG/DL (ref 170–490)
FIBRINOGEN PPP-MCNC: 394 MG/DL (ref 170–490)
FIBRINOGEN PPP-MCNC: 425 MG/DL (ref 170–490)
FIBRINOGEN PPP-MCNC: 431 MG/DL (ref 170–490)
FIBRINOGEN PPP-MCNC: 436 MG/DL (ref 170–490)
GLUCOSE BLDC GLUCOMTR-MCNC: 121 MG/DL (ref 70–99)
GLUCOSE BLDC GLUCOMTR-MCNC: 128 MG/DL (ref 70–99)
GLUCOSE BLDC GLUCOMTR-MCNC: 133 MG/DL (ref 70–99)
GLUCOSE BLDC GLUCOMTR-MCNC: 135 MG/DL (ref 70–99)
GLUCOSE BLDC GLUCOMTR-MCNC: 146 MG/DL (ref 70–99)
GLUCOSE BLDC GLUCOMTR-MCNC: 147 MG/DL (ref 70–99)
GLUCOSE BLDC GLUCOMTR-MCNC: 148 MG/DL (ref 70–99)
GLUCOSE SERPL-MCNC: 130 MG/DL (ref 70–99)
GLUCOSE SERPL-MCNC: 134 MG/DL (ref 70–99)
GLUCOSE SERPL-MCNC: 139 MG/DL (ref 70–99)
GLUCOSE SERPL-MCNC: 143 MG/DL (ref 70–99)
GLUCOSE SERPL-MCNC: 144 MG/DL (ref 70–99)
GLUCOSE SERPL-MCNC: 146 MG/DL (ref 70–99)
HCO3 BLD-SCNC: 19 MMOL/L (ref 21–28)
HCT VFR BLD AUTO: 26.2 % (ref 40–53)
HCT VFR BLD AUTO: 26.3 % (ref 40–53)
HCT VFR BLD AUTO: 26.4 % (ref 40–53)
HCT VFR BLD AUTO: 26.9 % (ref 40–53)
HCT VFR BLD AUTO: 30.7 % (ref 40–53)
HCT VFR BLD AUTO: 30.9 % (ref 40–53)
HGB BLD-MCNC: 10.4 G/DL (ref 13.3–17.7)
HGB BLD-MCNC: 10.8 G/DL (ref 13.3–17.7)
HGB BLD-MCNC: 8.6 G/DL (ref 13.3–17.7)
HGB BLD-MCNC: 8.7 G/DL (ref 13.3–17.7)
HGB BLD-MCNC: 8.9 G/DL (ref 13.3–17.7)
HGB BLD-MCNC: 9.2 G/DL (ref 13.3–17.7)
INR PPP: 1.57 (ref 0.85–1.15)
INR PPP: 1.58 (ref 0.85–1.15)
INR PPP: 1.59 (ref 0.85–1.15)
INR PPP: 1.6 (ref 0.85–1.15)
INR PPP: 1.63 (ref 0.85–1.15)
INR PPP: 1.64 (ref 0.85–1.15)
ISSUE DATE AND TIME: NORMAL
ISSUE DATE AND TIME: NORMAL
LACTATE SERPL-SCNC: 1.6 MMOL/L (ref 0.7–2)
LACTATE SERPL-SCNC: 1.9 MMOL/L (ref 0.7–2)
LACTATE SERPL-SCNC: 2 MMOL/L (ref 0.7–2)
LACTATE SERPL-SCNC: 2.1 MMOL/L (ref 0.7–2)
LACTATE SERPL-SCNC: 2.2 MMOL/L (ref 0.7–2)
LACTATE SERPL-SCNC: 2.4 MMOL/L (ref 0.7–2)
LACTATE SERPL-SCNC: 2.6 MMOL/L (ref 0.7–2)
MAGNESIUM SERPL-MCNC: 2 MG/DL (ref 1.7–2.3)
MAGNESIUM SERPL-MCNC: 2 MG/DL (ref 1.7–2.3)
MAGNESIUM SERPL-MCNC: 2.1 MG/DL (ref 1.7–2.3)
MAGNESIUM SERPL-MCNC: 2.2 MG/DL (ref 1.7–2.3)
MAGNESIUM SERPL-MCNC: 2.4 MG/DL (ref 1.7–2.3)
MAGNESIUM SERPL-MCNC: 2.4 MG/DL (ref 1.7–2.3)
MCH RBC QN AUTO: 33 PG (ref 26.5–33)
MCH RBC QN AUTO: 33.2 PG (ref 26.5–33)
MCH RBC QN AUTO: 33.3 PG (ref 26.5–33)
MCH RBC QN AUTO: 33.3 PG (ref 26.5–33)
MCH RBC QN AUTO: 33.6 PG (ref 26.5–33)
MCH RBC QN AUTO: 34.1 PG (ref 26.5–33)
MCHC RBC AUTO-ENTMCNC: 32.6 G/DL (ref 31.5–36.5)
MCHC RBC AUTO-ENTMCNC: 33.2 G/DL (ref 31.5–36.5)
MCHC RBC AUTO-ENTMCNC: 33.7 G/DL (ref 31.5–36.5)
MCHC RBC AUTO-ENTMCNC: 33.8 G/DL (ref 31.5–36.5)
MCHC RBC AUTO-ENTMCNC: 34.2 G/DL (ref 31.5–36.5)
MCHC RBC AUTO-ENTMCNC: 35.2 G/DL (ref 31.5–36.5)
MCV RBC AUTO: 100 FL (ref 78–100)
MCV RBC AUTO: 101 FL (ref 78–100)
MCV RBC AUTO: 97 FL (ref 78–100)
MCV RBC AUTO: 98 FL (ref 78–100)
MCV RBC AUTO: 99 FL (ref 78–100)
MCV RBC AUTO: 99 FL (ref 78–100)
O2/TOTAL GAS SETTING VFR VENT: 40 %
OXYHGB MFR BLD: 91 % (ref 92–100)
OXYHGB MFR BLD: 92 % (ref 92–100)
PCO2 BLD: 33 MM HG (ref 35–45)
PCO2 BLD: 36 MM HG (ref 35–45)
PCO2 BLD: 38 MM HG (ref 35–45)
PH BLD: 7.3 [PH] (ref 7.35–7.45)
PH BLD: 7.32 [PH] (ref 7.35–7.45)
PH BLD: 7.37 [PH] (ref 7.35–7.45)
PHOSPHATE SERPL-MCNC: 3.6 MG/DL (ref 2.5–4.5)
PHOSPHATE SERPL-MCNC: 3.6 MG/DL (ref 2.5–4.5)
PHOSPHATE SERPL-MCNC: 3.8 MG/DL (ref 2.5–4.5)
PHOSPHATE SERPL-MCNC: 3.8 MG/DL (ref 2.5–4.5)
PHOSPHATE SERPL-MCNC: 3.9 MG/DL (ref 2.5–4.5)
PHOSPHATE SERPL-MCNC: 3.9 MG/DL (ref 2.5–4.5)
PLATELET # BLD AUTO: 105 10E3/UL (ref 150–450)
PLATELET # BLD AUTO: 106 10E3/UL (ref 150–450)
PLATELET # BLD AUTO: 106 10E3/UL (ref 150–450)
PLATELET # BLD AUTO: 112 10E3/UL (ref 150–450)
PLATELET # BLD AUTO: 77 10E3/UL (ref 150–450)
PLATELET # BLD AUTO: 91 10E3/UL (ref 150–450)
PO2 BLD: 116 MM HG (ref 80–105)
PO2 BLD: 68 MM HG (ref 80–105)
PO2 BLD: 70 MM HG (ref 80–105)
POTASSIUM SERPL-SCNC: 4.6 MMOL/L (ref 3.4–5.3)
POTASSIUM SERPL-SCNC: 4.7 MMOL/L (ref 3.4–5.3)
POTASSIUM SERPL-SCNC: 4.7 MMOL/L (ref 3.4–5.3)
POTASSIUM SERPL-SCNC: 4.8 MMOL/L (ref 3.4–5.3)
POTASSIUM SERPL-SCNC: 4.9 MMOL/L (ref 3.4–5.3)
POTASSIUM SERPL-SCNC: 4.9 MMOL/L (ref 3.4–5.3)
PROT SERPL-MCNC: 3.1 G/DL (ref 6.4–8.3)
PROT SERPL-MCNC: 3.2 G/DL (ref 6.4–8.3)
PROT SERPL-MCNC: 3.4 G/DL (ref 6.4–8.3)
PROT SERPL-MCNC: 3.4 G/DL (ref 6.4–8.3)
PROT SERPL-MCNC: 3.5 G/DL (ref 6.4–8.3)
PROT SERPL-MCNC: 3.5 G/DL (ref 6.4–8.3)
RBC # BLD AUTO: 2.61 10E6/UL (ref 4.4–5.9)
RBC # BLD AUTO: 2.61 10E6/UL (ref 4.4–5.9)
RBC # BLD AUTO: 2.65 10E6/UL (ref 4.4–5.9)
RBC # BLD AUTO: 2.76 10E6/UL (ref 4.4–5.9)
RBC # BLD AUTO: 3.13 10E6/UL (ref 4.4–5.9)
RBC # BLD AUTO: 3.17 10E6/UL (ref 4.4–5.9)
SODIUM SERPL-SCNC: 139 MMOL/L (ref 135–145)
SODIUM SERPL-SCNC: 141 MMOL/L (ref 135–145)
TRIGL SERPL-MCNC: 357 MG/DL
UNIT ABO/RH: NORMAL
UNIT ABO/RH: NORMAL
UNIT NUMBER: NORMAL
UNIT NUMBER: NORMAL
UNIT STATUS: NORMAL
UNIT STATUS: NORMAL
UNIT TYPE ISBT: 6200
UNIT TYPE ISBT: 6200
WBC # BLD AUTO: 5.7 10E3/UL (ref 4–11)
WBC # BLD AUTO: 6 10E3/UL (ref 4–11)
WBC # BLD AUTO: 6.5 10E3/UL (ref 4–11)
WBC # BLD AUTO: 6.6 10E3/UL (ref 4–11)
WBC # BLD AUTO: 7.5 10E3/UL (ref 4–11)
WBC # BLD AUTO: 8.3 10E3/UL (ref 4–11)

## 2023-09-27 PROCEDURE — 250N000011 HC RX IP 250 OP 636: Performed by: PHARMACIST

## 2023-09-27 PROCEDURE — 83605 ASSAY OF LACTIC ACID: CPT | Performed by: PHARMACIST

## 2023-09-27 PROCEDURE — 85027 COMPLETE CBC AUTOMATED: CPT | Performed by: PHYSICIAN ASSISTANT

## 2023-09-27 PROCEDURE — 84075 ASSAY ALKALINE PHOSPHATASE: CPT | Performed by: PHYSICIAN ASSISTANT

## 2023-09-27 PROCEDURE — 999N000157 HC STATISTIC RCP TIME EA 10 MIN

## 2023-09-27 PROCEDURE — 99291 CRITICAL CARE FIRST HOUR: CPT | Mod: GC | Performed by: SURGERY

## 2023-09-27 PROCEDURE — 258N000003 HC RX IP 258 OP 636

## 2023-09-27 PROCEDURE — P9037 PLATE PHERES LEUKOREDU IRRAD: HCPCS | Performed by: PHARMACIST

## 2023-09-27 PROCEDURE — 85610 PROTHROMBIN TIME: CPT | Performed by: PHYSICIAN ASSISTANT

## 2023-09-27 PROCEDURE — 250N000011 HC RX IP 250 OP 636: Performed by: STUDENT IN AN ORGANIZED HEALTH CARE EDUCATION/TRAINING PROGRAM

## 2023-09-27 PROCEDURE — 84155 ASSAY OF PROTEIN SERUM: CPT | Performed by: PHYSICIAN ASSISTANT

## 2023-09-27 PROCEDURE — 250N000013 HC RX MED GY IP 250 OP 250 PS 637: Performed by: STUDENT IN AN ORGANIZED HEALTH CARE EDUCATION/TRAINING PROGRAM

## 2023-09-27 PROCEDURE — 82040 ASSAY OF SERUM ALBUMIN: CPT | Performed by: PHYSICIAN ASSISTANT

## 2023-09-27 PROCEDURE — 84100 ASSAY OF PHOSPHORUS: CPT | Performed by: PHYSICIAN ASSISTANT

## 2023-09-27 PROCEDURE — C9113 INJ PANTOPRAZOLE SODIUM, VIA: HCPCS | Mod: JZ | Performed by: STUDENT IN AN ORGANIZED HEALTH CARE EDUCATION/TRAINING PROGRAM

## 2023-09-27 PROCEDURE — 85384 FIBRINOGEN ACTIVITY: CPT | Performed by: PHYSICIAN ASSISTANT

## 2023-09-27 PROCEDURE — 84478 ASSAY OF TRIGLYCERIDES: CPT | Performed by: SURGERY

## 2023-09-27 PROCEDURE — 84450 TRANSFERASE (AST) (SGOT): CPT | Performed by: PHYSICIAN ASSISTANT

## 2023-09-27 PROCEDURE — 85730 THROMBOPLASTIN TIME PARTIAL: CPT | Performed by: PHYSICIAN ASSISTANT

## 2023-09-27 PROCEDURE — 82805 BLOOD GASES W/O2 SATURATION: CPT | Performed by: PHARMACIST

## 2023-09-27 PROCEDURE — 83735 ASSAY OF MAGNESIUM: CPT | Performed by: PHYSICIAN ASSISTANT

## 2023-09-27 PROCEDURE — 999N000128 HC STATISTIC PERIPHERAL IV START W/O US GUIDANCE

## 2023-09-27 PROCEDURE — 250N000011 HC RX IP 250 OP 636: Mod: JZ | Performed by: PHYSICIAN ASSISTANT

## 2023-09-27 PROCEDURE — P9037 PLATE PHERES LEUKOREDU IRRAD: HCPCS | Performed by: PHYSICIAN ASSISTANT

## 2023-09-27 PROCEDURE — 82803 BLOOD GASES ANY COMBINATION: CPT | Performed by: SURGERY

## 2023-09-27 PROCEDURE — 82330 ASSAY OF CALCIUM: CPT | Performed by: PHARMACIST

## 2023-09-27 PROCEDURE — 250N000011 HC RX IP 250 OP 636: Performed by: SURGERY

## 2023-09-27 PROCEDURE — 3E0336Z INTRODUCTION OF NUTRITIONAL SUBSTANCE INTO PERIPHERAL VEIN, PERCUTANEOUS APPROACH: ICD-10-PCS | Performed by: SURGERY

## 2023-09-27 PROCEDURE — 200N000002 HC R&B ICU UMMC

## 2023-09-27 PROCEDURE — 258N000003 HC RX IP 258 OP 636: Performed by: PHARMACIST

## 2023-09-27 PROCEDURE — 250N000011 HC RX IP 250 OP 636: Mod: JZ

## 2023-09-27 PROCEDURE — 250N000009 HC RX 250: Performed by: SURGERY

## 2023-09-27 RX ORDER — DEXTROSE MONOHYDRATE 100 MG/ML
INJECTION, SOLUTION INTRAVENOUS CONTINUOUS PRN
Status: DISCONTINUED | OUTPATIENT
Start: 2023-09-27 | End: 2023-10-04

## 2023-09-27 RX ORDER — CALCIUM GLUCONATE 20 MG/ML
2 INJECTION, SOLUTION INTRAVENOUS ONCE
Status: COMPLETED | OUTPATIENT
Start: 2023-09-27 | End: 2023-09-27

## 2023-09-27 RX ORDER — MAGNESIUM OXIDE 400 MG/1
400 TABLET ORAL EVERY 4 HOURS
Status: DISCONTINUED | OUTPATIENT
Start: 2023-09-27 | End: 2023-09-27

## 2023-09-27 RX ORDER — MAGNESIUM SULFATE HEPTAHYDRATE 40 MG/ML
2 INJECTION, SOLUTION INTRAVENOUS ONCE
Status: COMPLETED | OUTPATIENT
Start: 2023-09-27 | End: 2023-09-27

## 2023-09-27 RX ADMIN — CEFEPIME HYDROCHLORIDE 2 G: 2 INJECTION, POWDER, FOR SOLUTION INTRAVENOUS at 21:05

## 2023-09-27 RX ADMIN — PANTOPRAZOLE SODIUM 40 MG: 40 INJECTION, POWDER, FOR SOLUTION INTRAVENOUS at 07:49

## 2023-09-27 RX ADMIN — CHLORHEXIDINE GLUCONATE 0.12% ORAL RINSE 15 ML: 1.2 LIQUID ORAL at 19:48

## 2023-09-27 RX ADMIN — VASOPRESSIN 2.4 UNITS/HR: 20 INJECTION INTRAVENOUS at 18:13

## 2023-09-27 RX ADMIN — METRONIDAZOLE 500 MG: 5 INJECTION, SOLUTION INTRAVENOUS at 08:11

## 2023-09-27 RX ADMIN — VASOPRESSIN 2.4 UNITS/HR: 20 INJECTION INTRAVENOUS at 08:15

## 2023-09-27 RX ADMIN — Medication 125 MCG/HR: at 08:18

## 2023-09-27 RX ADMIN — VASOPRESSIN 2.4 UNITS/HR: 20 INJECTION INTRAVENOUS at 02:34

## 2023-09-27 RX ADMIN — SODIUM CHLORIDE, POTASSIUM CHLORIDE, SODIUM LACTATE AND CALCIUM CHLORIDE 1000 ML: 600; 310; 30; 20 INJECTION, SOLUTION INTRAVENOUS at 10:56

## 2023-09-27 RX ADMIN — DEXTROSE AND SODIUM CHLORIDE: 5; 900 INJECTION, SOLUTION INTRAVENOUS at 03:41

## 2023-09-27 RX ADMIN — PROPOFOL 20 MCG/KG/MIN: 10 INJECTION, EMULSION INTRAVENOUS at 06:44

## 2023-09-27 RX ADMIN — SODIUM CHLORIDE, POTASSIUM CHLORIDE, SODIUM LACTATE AND CALCIUM CHLORIDE 1000 ML: 600; 310; 30; 20 INJECTION, SOLUTION INTRAVENOUS at 16:49

## 2023-09-27 RX ADMIN — SODIUM CHLORIDE, POTASSIUM CHLORIDE, SODIUM LACTATE AND CALCIUM CHLORIDE 1000 ML: 600; 310; 30; 20 INJECTION, SOLUTION INTRAVENOUS at 01:07

## 2023-09-27 RX ADMIN — SODIUM CHLORIDE, POTASSIUM CHLORIDE, SODIUM LACTATE AND CALCIUM CHLORIDE 1000 ML: 600; 310; 30; 20 INJECTION, SOLUTION INTRAVENOUS at 07:49

## 2023-09-27 RX ADMIN — MAGNESIUM SULFATE IN WATER 2 G: 40 INJECTION, SOLUTION INTRAVENOUS at 09:34

## 2023-09-27 RX ADMIN — CALCIUM GLUCONATE: 98 INJECTION, SOLUTION INTRAVENOUS at 19:47

## 2023-09-27 RX ADMIN — SODIUM CHLORIDE, POTASSIUM CHLORIDE, SODIUM LACTATE AND CALCIUM CHLORIDE 1000 ML: 600; 310; 30; 20 INJECTION, SOLUTION INTRAVENOUS at 19:48

## 2023-09-27 RX ADMIN — PROPOFOL 20 MCG/KG/MIN: 10 INJECTION, EMULSION INTRAVENOUS at 16:05

## 2023-09-27 RX ADMIN — DEXTROSE AND SODIUM CHLORIDE: 5; 900 INJECTION, SOLUTION INTRAVENOUS at 20:00

## 2023-09-27 RX ADMIN — METRONIDAZOLE 500 MG: 5 INJECTION, SOLUTION INTRAVENOUS at 19:52

## 2023-09-27 RX ADMIN — CHLORHEXIDINE GLUCONATE 0.12% ORAL RINSE 15 ML: 1.2 LIQUID ORAL at 07:49

## 2023-09-27 RX ADMIN — CALCIUM GLUCONATE 2 G: 20 INJECTION, SOLUTION INTRAVENOUS at 12:46

## 2023-09-27 RX ADMIN — DEXTROSE AND SODIUM CHLORIDE: 5; 900 INJECTION, SOLUTION INTRAVENOUS at 10:09

## 2023-09-27 ASSESSMENT — ACTIVITIES OF DAILY LIVING (ADL)
ADLS_ACUITY_SCORE: 43
ADLS_ACUITY_SCORE: 39
ADLS_ACUITY_SCORE: 39
ADLS_ACUITY_SCORE: 43
ADLS_ACUITY_SCORE: 39

## 2023-09-27 NOTE — OP NOTE
VASCULAR SURGERY OPERATIVE REPORT     LOCATION:    [facility] [place]    Alfredo Burnham  Medical Record #:  [id number]  YOB: 1955  Age:  68 year old       Date of Service: 9/24/2023 - 9/26/2023     Preoperative diagnosis    Status post repair of Ruptured pararenal Abdominal Aortic Aneurysm    Postoperative diagnosis    Status post repair of Ruptured pararenal Abdominal Aortic Aneurysm    Surgeon: Boris Lowe MD  First Assistant: Ada Donald MD (Pgy3 vascular surgery resident)  A first assistant actively participated and was necessary for one or more of the following: opening, exposure and visualization during the case, maintaining hemostasis, wound closure resulting in its safe and expeditious completion.       Procedures:  Abdominal exploration and washout  Closure of retroperitoneum  Temporary abdominal closure  Irrigation and debridement left lower extremity wounds     Findings:   -Perfused small bowel with improving appearance  - No full thickness ischemia of colon  - Ischemic but non necrotic lower extremity musculature    Indication for procedure:    Mr. Burnham is a 68 year old year old male who presented to his local emergency department with a ruptured pararenal aortic aneurysm on morning of 9/25/2023 and underwent.  He returns for planned staged return for evaluation of abdominal contents and lower extremity wounds.  Consent previously obtained from family.      Description of procedure:    Operative details:    The patient was brought to the hybrid operating room.  Under satisfactory general anesthesia, he was placed in supine position with all pressure points padded in standard fashion.  The abdomen and left lower leg were widely prepped and draped in sterile, standard fashion.  A surgical pause was performed with all members of the surgical team to verify patient, medical record number, birth date, planned surgical procedure, surgical  site, allergies, fire risk and perioperative antibiotics.    The abthera was removed and abdomen explored.  The small bowel was pink without any areas of necrosis.  The sigmoid colon was pink with two areas of hemorrhage that were new from yesterday per my colleagues.  There is one slightly thin area in the distal transverse colon but no transmural ischemia.  I asked the colon and rectal surgery team to compare to the day prior, which they did.  They did not feel there was any concern.  The omni was place and the transverse colon retracted cephalad.  The small bowel was packed to the right.  The retroperitoneum was explored.  The 3 Quikclot were removed and documented.  The graft was hemostatic.  There were a few small areas of raw surface bleeding that were controlled with electrocautery.  I examined the renal arteries which both had signals.  The right was very low resistance, the left slightly higher resistance but appropriate and stable from when we left the index operation.  The aneurysm sac was closed over the graft with 3-0 prolene.  The retroperitoneum was then closed also with 3-0 prolene.  There was no graft visible after closure.  The abdomen was irrigated with 3 L of warm solution.  The abthera was replaced.  Attention was turned to the leg.  The muscle was ischemic but viable with no necrosis.  The leg was irrigated and minimal excisional debridement performed.  We decided to partially close the medial incision and close the lateral incision to try to get the muscle to fibrose.  Closing counts were correct.    Wound dimensions: Medial incision: 28cm long by 8cm wide with closure of 10cm of distal portion and 4cm of proximal portion.   Lateral incision: 8cm long x 1.5cm wide - layered closure of entire incision with 2-0 PDS and 3-0 nylon interrupted vertical mattresses    He was transferred to the ICU in critical condition unchanged from previous.  The family was updated.    Estimated blood loss: 25 ml      Specimens: none     Complications: none apparent    Plan:  -Aggressive resuscitation, no diuresis  -Normalize coags          Boris Lowe MD, RPVI  VASCULAR AND ENDOVASCULAR SURGERY   [facility] Vascular Surgery

## 2023-09-27 NOTE — PROGRESS NOTES
CLINICAL NUTRITION SERVICES - ASSESSMENT NOTE     Nutrition Prescription    RECOMMENDATIONS FOR MDs/PROVIDERS TO ORDER:  Adjust IVFs with start of TPN @ 2000    Malnutrition Status:    Patient does not meet two of the established criteria necessary for diagnosing malnutrition but is at risk for malnutrition    Recommendations already ordered by Registered Dietitian (RD):  Dosing weight:  72 kg  Access: Central    Initial parameters (per day)  Volume:  Max concentrated  Dextrose: 180 g  AA: 120 g  Lipids: Hold with propofol.  250 ml 20% clinolipid daily when off propofol    Dextrose titration:   Monitor lytes and if within acceptable parameters (Mg++ > or = 1.5, K+ is > or = 3, and PO4 > or = 1.9), increase dextrose by 30 g/day to goal of 240 g dextrose.    Additives: Infuvite, MTE daily      Goal PN provides 240 g dextrose, 120 g AA, and lipid kcals from propofol vs daily 250ml 20% clinolipid for total provision of  1796 Kcals (25 Kcals/kg), 1.7 g/kg protein, GIR 2.3 mg/kg/minute, and 28% fat kcals on average daily.     Future/Additional Recommendations:  - Obtain fasting TG level as able (when off propofol)  - Add lipids (250ml 20% clinolipid) when propofol discontinued  - Monitor weekly LFTs, Bilirubin and daily lytes with TPN  - Monitor renal function  - Follow for ability to feed enterally.  If EN becomes plan of care, recommend: Goal EN of Pivot 1.5 Juvencio (or equivalent) @ goal of 55ml/hr (1320ml/day) provides: 1980 kcals, 123 g PRO, 990 ml free H20, 227 g CHO, and 9 g fiber daily.      REASON FOR ASSESSMENT  Alfredo Burnham is a/an 68 year old male assessed by the dietitian for Pharmacy/Nutrition to Start and Manage PN    admitted to the SICU on 9/24/2023 s/p open AAA repair. Patient has a history of HTN and previous TIA. He presented to the ED with right sided abdominal pain and was found to have a contained, ruptured AAA on CT. 30 min super-celiac clamp time. Prolonged intra- renal clamp. Now s/p flex sig  "showing rectal ischemia, abdominal washout showing a hemostatic AAA graft and no evidence of transmural colonic or small bowel ischemia, and an unsuccessful LLE embolectomy 9/25. Also now s/p repeat abdominal washout, retroperitoneal closure, LLE wound washout and closure 9/26. Patient remains intubated with an open abdomen, receiving MTP.  Plan for RTOR in coming days.    NUTRITION HISTORY  Pt NPO since admit, unable to feed enterally - Bloody stools, c/f rectal ischemia (flex sig finding)  Pt sedated / intubated.  Family at bedside report pt was eating well until 9/23 with onset of abd pain  LLE wound    CURRENT NUTRITION ORDERS  Diet: NPO with NGT to LIS    LABS  Na+ 139  K+ 4.8  BUN 28.7 (H)  Cr 3.94 (H)  Mg+ 2.0  Phos 3.8   (H)   (H)  T bili 1.3 (H) <--1.6 (H)  9/27  (H) - not fasting, propofol infusing    MEDICATIONS  Medications reviewed and notable for propofol gtt, norepi gtt, vasopressin gtt, D5 @ 150 ml/hr (180 g dextrose)    ANTHROPOMETRICS  Height: 172.7 cm (5' 8\")  Most Recent Weight: 87.5 kg (192 lb 14.4 oz) - fluid up  IBW: 70 kg (125%)  BMI: Overweight BMI 25-29.9  Weight History: no weight changes per family    Dosing Weight: 72 kg - driest weight on admit 9/24    ASSESSED NUTRITION NEEDS  Estimated Energy Needs: 1800 - 2160 kcals/day (25 - 30 kcals/kg)  Justification: Maintenance, aim for lower end with PN  Estimated Protein Needs: 108 - 144 grams protein/day (1.5 - 2 grams of pro/kg)  Justification: pending renal function, Hypercatabolism with acute illness, Post-op, and wound healing  Estimated Fluid Needs: 1 mL/kcal  Justification: Maintenance    MALNUTRITION  % Intake: </= 50% for >/= 5 days (severe)  % Weight Loss: None noted  Subcutaneous Fat Loss: None observed  Muscle Loss: None observed  Fluid Accumulation/Edema: Does not meet criteria  Malnutrition Diagnosis: Patient does not meet two of the established criteria necessary for diagnosing malnutrition but is at risk for " malnutrition    NUTRITION DIAGNOSIS  Altered GI function related to GI bleed and concern for bowel ischemia as evidenced by strict NPO with NGT to LIS and reliant on TPN to meet 100% nutrition needs      INTERVENTIONS  Implementation  Collaboration with other providers  Parenteral Nutrition/IV Fluids - Initiate     Goals  Total avg nutritional intake to meet a minimum of 25 kcal/kg and 1.5 g PRO/kg daily (per dosing wt 72 kg).     Monitoring/Evaluation  Progress toward goals will be monitored and evaluated per protocol.    Natty Zeng, RD, LD, CNSC  [address] pager: [phone]  Ascom: [phone]  Weekend/Holiday RD pager [phone]

## 2023-09-27 NOTE — PROGRESS NOTES
SURGICAL ICU PROGRESS NOTE  09/27/2023        Date of Service (when I saw the patient): 09/27/2023    ASSESSMENT:  Alfredo Burnham is a 68 year old male who was admitted to the SICU on 9/24/2023 s/p open AAA repair. Patient has a history of HTN and previous TIA. He presented to the ED earlier today with right sided abdominal pain and was found to have a contained, ruptured AAA on CT. 30 min super-celiac clamp time. Prolonged intra- renal clamp. Now s/p flex sig showing rectal ischemia, abdominal washout showing a hemostatic AAA graft and no evidence of transmural colonic or small bowel ischemia, and an unsuccessful LLE embolectomy 9/25. Also now s/p repeat abdominal washout, retroperitoneal closure, LLE wound washout and closure 9/26. Patient remains intubated with an open abdomen, receiving MTP.    CHANGES and MAJOR THINGS TODAY:   -Continue fluid resuscitation (150 ml/hr of total fluids)  -Begin TPN (45/hr to start at 2000)  -Fentanyl to 150  -Decrease PEEP to 5 and TV to 450    PLAN:    Neurological:  # Acute pain   # Agitation   # Encephalopathy   - Monitor neurological status. Delirium preventions and precautions  - Sedation plan: propofol infusion @ 20 --> continue to wean  - Pain: increase fentanyl to 150        Pulmonary:  # Post operative ventilatory support  # Acute hypoxic respiratory support   - VENT: AC-VC 40% FiO2, tidal volume 450 (from 500), decrease RR to 16, PEEP 5 (from 8). Continue full vent support. PST when meets criteria.  Ventilatory bundle. HOB elevation   - Satting well overnight (%)  - Supplemental oxygen to keep saturation above 92%.  - TV to 450    Cardiovascular:    # Shock-hypovolemic  # H/o HTN  # Contained AAA rupture s/p open repair  # Suspected emboli to LLE  - Monitor hemodynamic status.   - Goal MAP >65, ideally -140--> High levo requirements overnight, vasopressin started as well  - Wean pressors as able  - RLE wrapped in bear hugger with sheepskin boot in  place    GI/Nutrition:    # Open abdomen  # Protein calorie deficit malnutrition, risk of  # Post-operative melena  # Rectal ischemia  - NPO  - PPI for ppx  - Zosyn for ppx of bacterial translocation  Appreciate GI recs 9/25:  -Continue NPO status   -Supportive cares for GI bleed:  -- Ensure two large bore IVs  -- Adequate volume resuscitation with IVF, pRBC  -- Maintain up to date type and screen.  -- Monitor Hgb closely. Transfuse for Hgb<7 (improved outcomes with restricted vs liberal threshold)  -- Monitor stool output.  Document color and quality.  -- Avoid NSAID  -Begin TPN    Fluids/Electrolytes:  - LR bolus for IV fluid hydration/resuscitation -- received 2L overnight; additional bolus 9/27 morning    Renal:   # Acute kidney injury  # Oliguria  #hyperkalemia resolved  - Concern for ATN  - No indication for CRRT at this time  - Urine output  ~15 mL/hr . Will continue to fluid resuscitate and monitor intake and output.    Endocrine:  # Stress hyperglycemia, risk of  - Insulin as needed; goal to keep BG< 180 for optimal wound healing     Infectious disease:   # filemon-operative prophylaxis   # bacterial translocation in setting of possibly ischemic bowel  - Cefepime/Flagyl for ppx for bacterial translocation    Hematology:    # Acute blood loss anemia  # Active resuscitation  - Goal hgb >8, fibrinogen >200, INR <1.5, plts >100   - Will continue active, balanced resuscitation.   - Trend coags and CBC q2hr in initial resuscitation period    Musculoskeletal:  # Weakness and deconditioning of critical illness, risk of  - Physical and occupational therapy consult when appropriate. Passive ROM at bedside with RN  - Flat bedrest, do not break bed      General Cares/Prophylaxis:    DVT Prophylaxis: Pneumatic Compression Devices  GI Prophylaxis: PPI  Restraints: Restraints for medical healing needed: YES    Lines/ tubes/ drains:  - ETT  - Lu  - Abthera  - PIV x3  - R IJ  - R radial arterial line     RLE and right groin  reinforced w/ strong tape     Disposition:  Surgical ICU    Patient seen, findings and plan discussed with surgical ICU staff, Dr. Escobedo.      Jenni Mora, MS4  Surgical ICU    ====================================  INTERVAL HISTORY:   Increasing pressure support needs and worsening renal function overnight. Continuing to wean sedation.     ROS unable to be performed due to sedation and intubation.      OBJECTIVE:   1. VITAL SIGNS:   Temp:  [98.4  F (36.9  C)-100.2  F (37.9  C)] 100  F (37.8  C)  Pulse:  [71-97] 91  Resp:  [15-26] 16  BP: (123-128)/(68-79) 128/79  MAP:  [57 mmHg-225 mmHg] 75 mmHg  Arterial Line BP: ()/(47-75) 102/60  FiO2 (%):  [40 %-50 %] 40 %  SpO2:  [88 %-100 %] 100 %  Vent Mode: CMV/AC  (Continuous Mandatory Ventilation/ Assist Control)  FiO2 (%): 40 %  Resp Rate (Set): 16 breaths/min  Tidal Volume (Set, mL): 500 mL  PEEP (cm H2O): 8 cmH2O  Resp: 16      2. INTAKE/ OUTPUT:   I/O last 3 completed shifts:  In: 9973.39 [I.V.:4957.39; IV Piggyback:4000]  Out: 3894 [Urine:334; Emesis/NG output:950; Drains:2600; Blood:10]    3. PHYSICAL EXAMINATION:  General: Intubated, sedated  HEENT: Normocephalic, atraumatic. NGT to LIS, ETT  Neuro: Sedated  Pulm/Resp: Intubated  CV: RRR  Abdomen: Open abdomen with Abthera in place, serosanguinous output, soft, moderately distended  :  aparicio catheter in place, urine yellow and clear  MSK/Extremities: LLE popliteal with doppler signal, no palpable pulse. Right palpable pedal signal, R pinky toe dusky. L lower leg and foot mottled, cold.    4. INVESTIGATIONS:   Arterial Blood Gases   Recent Labs   Lab 09/26/23  2132 09/26/23  1725 09/26/23  1538 09/26/23  1137   PH 7.33* 7.31* 7.42 7.45   PCO2 39 42 34* 33*   PO2 105 106* 142* 143*   HCO3 21 21 22 23     Complete Blood Count   Recent Labs   Lab 09/27/23  0420 09/27/23  0004 09/26/23 2023 09/26/23  1725 09/26/23  1538   WBC 7.5 8.3 7.1  --  6.0   HGB 10.4* 10.8* 11.2* 11.7* 10.1*   PLT 91* 105* 111*   --  89*     Basic Metabolic Panel  Recent Labs   Lab 09/27/23  0607 09/27/23  0420 09/27/23  0012 09/27/23  0004 09/26/23 2036 09/26/23 2023 09/26/23  1725 09/26/23  1541 09/26/23  1538   NA  --  139  --  139  --  139 137  --  138   POTASSIUM  --  4.9  --  4.9  --  5.2 4.5  --  4.5   CHLORIDE  --  110*  --  108*  --  108*  --   --  108*   CO2  --  17*  --  19*  --  18*  --   --  19*   BUN  --  27.8*  --  28.2*  --  26.4*  --   --  27.7*   CR  --  3.76*  --  3.50*  --  3.58*  --   --  3.32*   * 143* 121* 139*   < > 110* 131*   < > 133*    < > = values in this interval not displayed.     Liver Function Tests  Recent Labs   Lab 09/27/23  0420 09/27/23  0004 09/26/23 2023 09/26/23  1538   * 759* 773* 718*   * 229* 241* 199*   ALKPHOS 48 61 49 40   BILITOTAL 1.5* 1.6* 1.6* 1.4*   ALBUMIN 1.7* 1.8* 2.2* 1.9*   INR 1.58* 1.64* 1.63* 1.60*     Pancreatic Enzymes  Recent Labs   Lab 09/24/23  1644   LIPASE 21     Coagulation Profile  Recent Labs   Lab 09/27/23 0420 09/27/23  0004 09/26/23 2023 09/26/23  1538   INR 1.58* 1.64* 1.63* 1.60*   PTT 38 38 37 36         5. RADIOLOGY:   No results found for this or any previous visit (from the past 24 hour(s)).    =========================================    I saw and evaluated the patient in an independent visit and agree with the student's assessment and plan as written above. I was present at all times for any mentioned procedures.     Clint Braga MD  PGY-1, Neurosurgery  Off-service on SICU

## 2023-09-27 NOTE — PROGRESS NOTES
Colorectal Surgery Progress Note  [facility]  POD#3      Subjective:  intubated and sedated.      Per overnight nurse, had one medium dark red to mucus like BM during overnight shift.  This is improved from prior evening as the prior evening was having bloody mucus like BMs every few hours.      Added vaso overnight and was able to bring down norepi.    Vitals:  Vitals:    09/27/23 0611 09/27/23 0615 09/27/23 0627 09/27/23 0630   BP:       BP Location:       Pulse: 91 96 89 88   Resp: 16 19 16 16   Temp: 100  F (37.8  C)      TempSrc:       SpO2: 100% 100% 100% 99%   Weight:       Height:         I/O:  I/O last 3 completed shifts:  In: 32745.69 [I.V.:5628.69; IV Piggyback:5000]  Out: 3774 [Urine:314; Emesis/NG output:850; Drains:2600; Blood:10]     yesterday / 250 since MN  Stool 3x yesterday / 1x since MN    Physical Exam:  Gen: Intubated & sedated.  Withdraws from abdominal palpation.   Pulm: Mechanically vented  Abd: Abd with abethera in place    BMP  Recent Labs   Lab 09/27/23  0607 09/27/23  0420 09/27/23  0012 09/27/23  0004 09/26/23  2036 09/26/23  2023 09/26/23  1725 09/26/23  1541 09/26/23  1538   NA  --  139  --  139  --  139 137  --  138   POTASSIUM  --  4.9  --  4.9  --  5.2 4.5  --  4.5   CHLORIDE  --  110*  --  108*  --  108*  --   --  108*   CO2  --  17*  --  19*  --  18*  --   --  19*   BUN  --  27.8*  --  28.2*  --  26.4*  --   --  27.7*   CR  --  3.76*  --  3.50*  --  3.58*  --   --  3.32*   * 143* 121* 139*   < > 110* 131*   < > 133*   MAG  --  2.0  --  2.1  --  2.3  --   --  2.5*   PHOS  --  3.9  --  3.9  --  4.7*  --   --  3.9    < > = values in this interval not displayed.     CBC  Recent Labs   Lab 09/27/23  0420 09/27/23  0004 09/26/23 2023 09/26/23  1725 09/26/23  1538   WBC 7.5 8.3 7.1  --  6.0   HGB 10.4* 10.8* 11.2* 11.7* 10.1*   HCT 30.9* 30.7* 32.5*  --  29.0*   PLT 91* 105* 111*  --  89*       ASSESSMENT: This is a 68 year old male PH HTN &  TIA. Presented w/ right sided abd pain, found to have contained & ruptured AAA.  S/p open AAA repair w/ 30 min super-celiac clamp time & prolonged intra-renal clamp on 9/24.  Flex sig w/ rectal ischemia.  RTOR on 9/25 w/ abd washout, abthera, - no transmural ischemia of rectum, colon, small bowel.      - defer management to primary providers  - appreciate continued strict in and outs  - NGT in place  - At this time, do not insert anything into rectum.  Avoid rectal tubes.    - CRS available as needed for re-evaluation/possible resection    DAMARI Cuenca-C ..................9/27/2023   07:15 AM  Colon and Rectal Surgery    Patient was seen and discussed with Dr. Yang    The above plan of care was performed and communicated to me by Dr. Shirley    I spent 30 minute face-to-face or coordinating care of Alfredo Burnham. Over 50% of our time on the unit was spent counseling the patient and/or coordinating care as documented in the assessment and plan.     [id number] post op hospital visit

## 2023-09-27 NOTE — PROGRESS NOTES
Vascular Surgery Progress Note  09/27/2023       Subjective:  To OR last night for abdominal washout, retroperitoneal closure, and washout left lower extremity wound. Continues to have dark red BMs although only one overnight, followed by colorectal surgery. Aside from robust fluid resuscitation, and ongoing norepinephrine, required adding vasopressin for hemodynamic support. LFTs are down-trending, Tbili continues to down trend as well.      Objective:  Temp:  [98.4  F (36.9  C)-100.2  F (37.9  C)] 99.9  F (37.7  C)  Pulse:  [71-97] 83  Resp:  [15-26] 16  BP: (123-128)/(68-79) 128/79  MAP:  [57 mmHg-225 mmHg] 90 mmHg  Arterial Line BP: ()/(47-72) 129/68  FiO2 (%):  [40 %-50 %] 40 %  SpO2:  [88 %-100 %] 100 %    I/O last 3 completed shifts:  In: 83213.69 [I.V.:5628.69; IV Piggyback:5000]  Out: 3774 [Urine:314; Emesis/NG output:850; Drains:2600; Blood:10]      Gen: intubated, sedated, moves upper extremities spontaneously  CV: RR per radial pulse, off pressors, sinus tach per tele  Resp: intubated, on ventilator  Abd: wound vac in place, holding seal, abdomen somewhat soft to palpation around VAC.    Ext: RLE wwp, mulitphasic DP and PT signal. LLE cool to touch,somewhat dusky. no DP/PT signals, no popliteal signal this morning. Left femoral with doppler signal. Compartments soft, compressible.     UOP: approximately 20ml/hr   Temp abd closure output: ~ 200cc/hr       Labs:  Recent Labs   Lab 09/27/23  0745 09/27/23  0420 09/27/23  0004   WBC 6.5 7.5 8.3   HGB 9.2* 10.4* 10.8*   * 91* 105*       Recent Labs   Lab 09/27/23  0747 09/27/23  0745 09/27/23  0607 09/27/23  0420 09/27/23  0012 09/27/23  0004   NA  --  139  --  139  --  139   POTASSIUM  --  4.8  --  4.9  --  4.9   CHLORIDE  --  111*  --  110*  --  108*   CO2  --  18*  --  17*  --  19*   BUN  --  28.7*  --  27.8*  --  28.2*   CR  --  3.94*  --  3.76*  --  3.50*   * 146* 128* 143*   < > 139*   OMAR  --  6.2*  --  6.4*  --  6.4*   MAG  --  2.0   --  2.0  --  2.1   PHOS  --  3.8  --  3.9  --  3.9    < > = values in this interval not displayed.       Imaging:  XR Chest Port 1 View    Result Date: 9/25/2023  EXAM: XR CHEST PORT 1 VIEW  9/25/2023 4:00 AM HISTORY:  Endotracheal tube positioning   COMPARISON:  None TECHNIQUE: Portable AP supine radiograph of chest FINDINGS: Endotracheal tube tip is in the mid thoracic trachea. Right IJ CVC tip projects over the cavoatrial junction.. Enteric tube projects over the stomach. The trachea is midline. The cardiomediastinal silhouette is within normal limits. The pulmonary vasculature is distinct. No appreciable pneumothorax or pleural effusion. No focal airspace consolidation. No acute osseous abnormality.     IMPRESSION: 1. Endotracheal tube tip is in the mid thoracic trachea. 2. No acute airspace disease. Streaky perihilar opacities likely represent atelectasis/edema. I have personally reviewed the examination and initial interpretation and I agree with the findings. ILEANA SIEGEL MD   SYSTEM ID:  [id number]    XR Abdomen Port 1 View    Result Date: 9/25/2023  Exam: XR ABDOMEN PORT 1 VIEW, 9/25/2023 4:12 AM Indication: NGT in place Comparison: None Findings: Portable AP supine radiograph of the abdomen. Enteric tube tip and sidehole project over the stomach. Partially visualized central venous catheter tip projecting over the right atrium. Surgical packing material projects over the abdomen. Multiple surgical clips projecting over the abdomen. Nonobstructive bowel gas pattern. No pneumatosis or portal venous gas.     Impression: 1. Gastric tube tip and sidehole project over the stomach. Packing material present in the abdomen. 2. Nonobstructive bowel gas pattern. I have personally reviewed the examination and initial interpretation and I agree with the findings. ILEANA SIEGEL MD   SYSTEM ID:  [id number]    ADDIS with Report    Result Date: 9/25/2023  Mason Foss MD     9/25/2023  7:53 AM Perioperative ADDIS Procedure  Note Staff -      Anesthesiologist:  Mason Foss MD      Performed By: anesthesiologist Preanesthesia Checklist:  Patient identified, IV assessed, risks and benefits discussed, monitors and equipment assessed, procedure being performed at surgeon's request and anesthesia consent obtained. ADDIS Probe Insertion Probe Number: Loaner 1 Probe Status PRE Insertion: NO obvious damage Probe type:  Adult 3D Bite block used:   Oral Airway Insertion Technique: Recurrent attempts, Laryngoscopy Insertion complications: None obvious Billing Report:A ADDIS report is NOT being generated. Probe Status POST Removal: NO obvious damage        Assessment/Plan:   68 year old male with PMH of HTN and previous TIA who presented with R sided abdominal pain found to have contained ruptured AAA, now s/p open AAA repair 9/24 into 9/25am with 30 min supraceliac clamp time, prolonged infrarenal clamp time, temporary abdominal closure. He remains critically ill in the ICU, although with some improvements in his lactic acidosis, he had bloody stool 9/25 am with flex sig 9/25/23, ongoing abssent signals in his LLE concerning for ischemia. We will continue to monitor LLE given patient overall clinical status is not stable enough for further intervention.    - Will plan for RTOR in the coming days for abdomen closure  - GI following given bloody stools   - Continue with bowel rest and NPO   - Broad spectrum abx to cover for gram negatives in setting of possible bowel ischemia  - Monitor renal function and electrolytes, serum creatinine at 3.94   - Pain/sedation medication per ICU   - Continue vent support, wean as able  - Wean pressors as tolerated  - Continue warming LLE  - Goal MAP >65  - Recommend ongoing resuscitation, patient appears to be fluid down still based on   - Continue insulin gtt as needed  - Continue aparicio     Discussed with vascular surgery staff, Dr. Kandace Mcleod who is in agreement with the above.    Miryam Carrasco, CNP  Vascular  Surgery  Pager: [phone]  [email or web address]  Send message or 10 digit call back number Securely via [employer] with the [employer] Web Console (learn more here)    [id number] post-op hospital visit

## 2023-09-27 NOTE — PROGRESS NOTES
Major Shift Events: No acute events overnight. RAAS -3, pinpoint pupils, continuing to wean sedation, moves RUE and LUE. Prop @ 20 and Fentanyl @ 125. NSR, no ectopy, T. .2, titrating levo for MAP >65 and systolic between 100-140. Added vaso. CVPs 10-11. Generalized edema. One liter LR bolus given x2. No pedal pulse in LLE. CMV settings 40%, 500, 16 and 8. NPO. Lu with minimal UO, one loose/mucous BM, NG to LIS with green/bile output. No new skin deficits, abdominal wound vac with sang output. Prop @ 20, fentanyl @ 125, D5 NS @ 150, levo @ 0.06 and vaso @ 2.4     Plan: continue to wean pressors and sedation. Notify providers with any changes.     For vital signs and complete assessments, please see documentation flowsheets.     Zofia Barrios RN on 9/27/2023 at 6:05 AM

## 2023-09-27 NOTE — ANESTHESIA CARE TRANSFER NOTE
Patient: Alfredo Burnham    Procedure: Procedure(s):  Abdominal washout, Retroperitoneal closure, washout left lower extremity wound       Diagnosis: Ruptured abdominal aortic aneurysm (AAA) (H) [I71.30]  Diagnosis Additional Information: No value filed.    Anesthesia Type:   General     Note:    Oropharynx: ventilatory support  Level of Consciousness: unresponsive  Patient oxygen source: ambu.  Level of Supplemental Oxygen (L/min / FiO2): 8    Dentition: dentition unchanged  Vital Signs Stable: post-procedure vital signs reviewed and stable  Report to RN Given: handoff report given  Patient transferred to: ICU    ICU Handoff: Call for PAUSE to initiate/utilize ICU HANDOFF, Identified Patient, Identified Responsible Provider, Reviewed the Pertinent Medical History, Discussed Surgical Course, Reviewed Intra-OP Anesthesia Management and Issues during Anesthesia, Set Expectations for Post Procedure Period and Allowed Opportunity for Questions and Acknowledgement of Understanding      Vitals:  Vitals Value Taken Time   BP     Temp     Pulse 96 09/26/23 1959   Resp 26 09/26/23 1959   SpO2 95 % 09/26/23 2000   Vitals shown include unvalidated device data.    Electronically Signed By: ALTAGRACIA Rizo CRNA  September 26, 2023  8:01 PM

## 2023-09-27 NOTE — PLAN OF CARE
Major Shift Events:  Fentanyl and propofol infusing for sedation and pain management. Patient opens eyes with turns and moves upper extremities spontaneously. Pupils ~2mm, equal and reactive. Vaso running at straight rate of 2.4 and vaso titrated to maintain MAP >65 and -140. Did not tolerate vaso wean this evening. SICU notified and vaso restarted. 3x 1L LR boluses given with minimal improvement in blood pressure. CVPs decreased from 12 to 7, however. SR/ST. T-max 99.9 F. CMV settings decreased to 40% FiO2, , rate 16, PEEP 5. Recheck ABG this evening. LS clear. NG to LIS with good output. Lu in place and having minimal UOP, which team is aware of. Plan to initiate TPN this evening. Had 1 loose, mucousy BM with scant amounts of redness mixed in stool. Wound vac in place and having large serosanguinous output. LLE fasciotomy dressing in place. Extremity cold and dusky at toes, no pedal or post tib pulse appreciated. Left groin pulse palpable.     Gtts:   Propofol @ 20  Fentanyl @ 125  Vaso @ 2.4  Levo currently  @ 0.12  D5 in NS @ 150, to decrease to 100ml/hr once TNP starts.    Plan: Initiate TPN, recheck ABG. Return to OR for closure. Question need for CRRT.   For vital signs and complete assessments, please see documentation flowsheets.       Goal Outcome Evaluation:      Plan of Care Reviewed With: spouse, family    Overall Patient Progress: no changeOverall Patient Progress: no change    Outcome Evaluation: plan to start TPN this evening, remains on pressors for BP stability.

## 2023-09-28 ENCOUNTER — APPOINTMENT (OUTPATIENT)
Dept: GENERAL RADIOLOGY | Facility: CLINIC | Age: 68
DRG: 268 | End: 2023-09-28
Attending: PHYSICIAN ASSISTANT
Payer: COMMERCIAL

## 2023-09-28 ENCOUNTER — APPOINTMENT (OUTPATIENT)
Dept: GENERAL RADIOLOGY | Facility: CLINIC | Age: 68
DRG: 268 | End: 2023-09-28
Payer: COMMERCIAL

## 2023-09-28 ENCOUNTER — APPOINTMENT (OUTPATIENT)
Dept: CARDIOLOGY | Facility: CLINIC | Age: 68
DRG: 268 | End: 2023-09-28
Payer: COMMERCIAL

## 2023-09-28 ENCOUNTER — ANESTHESIA EVENT (OUTPATIENT)
Dept: SURGERY | Facility: CLINIC | Age: 68
DRG: 268 | End: 2023-09-28
Payer: COMMERCIAL

## 2023-09-28 LAB
ABO/RH(D): NORMAL
ALBUMIN SERPL BCG-MCNC: 1.5 G/DL (ref 3.5–5.2)
ALBUMIN SERPL BCG-MCNC: 1.5 G/DL (ref 3.5–5.2)
ALBUMIN SERPL BCG-MCNC: 1.6 G/DL (ref 3.5–5.2)
ALBUMIN SERPL BCG-MCNC: 1.8 G/DL (ref 3.5–5.2)
ALBUMIN SERPL BCG-MCNC: 1.9 G/DL (ref 3.5–5.2)
ALBUMIN SERPL BCG-MCNC: 2.4 G/DL (ref 3.5–5.2)
ALLEN'S TEST: ABNORMAL
ALP SERPL-CCNC: 67 U/L (ref 40–129)
ALP SERPL-CCNC: 67 U/L (ref 40–129)
ALP SERPL-CCNC: 69 U/L (ref 40–129)
ALP SERPL-CCNC: 69 U/L (ref 40–129)
ALP SERPL-CCNC: 72 U/L (ref 40–129)
ALP SERPL-CCNC: 86 U/L (ref 40–129)
ALT SERPL W P-5'-P-CCNC: 111 U/L (ref 0–70)
ALT SERPL W P-5'-P-CCNC: 150 U/L (ref 0–70)
ALT SERPL W P-5'-P-CCNC: 193 U/L (ref 0–70)
ALT SERPL W P-5'-P-CCNC: 221 U/L (ref 0–70)
ALT SERPL W P-5'-P-CCNC: 267 U/L (ref 0–70)
ALT SERPL W P-5'-P-CCNC: 285 U/L (ref 0–70)
ANION GAP SERPL CALCULATED.3IONS-SCNC: 10 MMOL/L (ref 7–15)
ANION GAP SERPL CALCULATED.3IONS-SCNC: 11 MMOL/L (ref 7–15)
ANION GAP SERPL CALCULATED.3IONS-SCNC: 12 MMOL/L (ref 7–15)
ANION GAP SERPL CALCULATED.3IONS-SCNC: 12 MMOL/L (ref 7–15)
ANION GAP SERPL CALCULATED.3IONS-SCNC: 14 MMOL/L (ref 7–15)
ANION GAP SERPL CALCULATED.3IONS-SCNC: 9 MMOL/L (ref 7–15)
ANTIBODY SCREEN: NEGATIVE
APTT PPP: 37 SECONDS (ref 22–38)
APTT PPP: 37 SECONDS (ref 22–38)
APTT PPP: 38 SECONDS (ref 22–38)
APTT PPP: 40 SECONDS (ref 22–38)
APTT PPP: 40 SECONDS (ref 22–38)
APTT PPP: 41 SECONDS (ref 22–38)
AST SERPL W P-5'-P-CCNC: 348 U/L (ref 0–45)
AST SERPL W P-5'-P-CCNC: 443 U/L (ref 0–45)
AST SERPL W P-5'-P-CCNC: 584 U/L (ref 0–45)
AST SERPL W P-5'-P-CCNC: 732 U/L (ref 0–45)
AST SERPL W P-5'-P-CCNC: 829 U/L (ref 0–45)
AST SERPL W P-5'-P-CCNC: 914 U/L (ref 0–45)
BASE EXCESS BLDA CALC-SCNC: -6.4 MMOL/L (ref -9–1.8)
BASE EXCESS BLDA CALC-SCNC: -7 MMOL/L (ref -9–1.8)
BASE EXCESS BLDA CALC-SCNC: -7 MMOL/L (ref -9–1.8)
BASE EXCESS BLDA CALC-SCNC: -7.3 MMOL/L (ref -9–1.8)
BASE EXCESS BLDA CALC-SCNC: -8 MMOL/L (ref -9–1.8)
BILIRUB DIRECT SERPL-MCNC: 1.17 MG/DL (ref 0–0.3)
BILIRUB SERPL-MCNC: 1.2 MG/DL
BILIRUB SERPL-MCNC: 1.3 MG/DL
BILIRUB SERPL-MCNC: 1.3 MG/DL
BILIRUB SERPL-MCNC: 1.5 MG/DL
BLD PROD TYP BPU: NORMAL
BLOOD COMPONENT TYPE: NORMAL
BUN SERPL-MCNC: 30 MG/DL (ref 8–23)
BUN SERPL-MCNC: 33.6 MG/DL (ref 8–23)
BUN SERPL-MCNC: 36.2 MG/DL (ref 8–23)
BUN SERPL-MCNC: 39.5 MG/DL (ref 8–23)
BUN SERPL-MCNC: 42.2 MG/DL (ref 8–23)
BUN SERPL-MCNC: 44.1 MG/DL (ref 8–23)
CA-I BLD-MCNC: 4 MG/DL (ref 4.4–5.2)
CA-I BLD-MCNC: 4 MG/DL (ref 4.4–5.2)
CA-I BLD-MCNC: 4.3 MG/DL (ref 4.4–5.2)
CA-I BLD-MCNC: 4.3 MG/DL (ref 4.4–5.2)
CA-I BLD-MCNC: 4.4 MG/DL (ref 4.4–5.2)
CALCIUM SERPL-MCNC: 6.4 MG/DL (ref 8.8–10.2)
CALCIUM SERPL-MCNC: 6.4 MG/DL (ref 8.8–10.2)
CALCIUM SERPL-MCNC: 6.5 MG/DL (ref 8.8–10.2)
CALCIUM SERPL-MCNC: 6.7 MG/DL (ref 8.8–10.2)
CALCIUM SERPL-MCNC: 7.1 MG/DL (ref 8.8–10.2)
CALCIUM SERPL-MCNC: 7.3 MG/DL (ref 8.8–10.2)
CHLORIDE SERPL-SCNC: 109 MMOL/L (ref 98–107)
CHLORIDE SERPL-SCNC: 110 MMOL/L (ref 98–107)
CHLORIDE SERPL-SCNC: 110 MMOL/L (ref 98–107)
CHLORIDE SERPL-SCNC: 111 MMOL/L (ref 98–107)
CHLORIDE SERPL-SCNC: 113 MMOL/L (ref 98–107)
CHLORIDE SERPL-SCNC: 113 MMOL/L (ref 98–107)
CODING SYSTEM: NORMAL
CREAT SERPL-MCNC: 4.28 MG/DL (ref 0.67–1.17)
CREAT SERPL-MCNC: 4.31 MG/DL (ref 0.67–1.17)
CREAT SERPL-MCNC: 4.43 MG/DL (ref 0.67–1.17)
CREAT SERPL-MCNC: 4.84 MG/DL (ref 0.67–1.17)
CREAT SERPL-MCNC: 5.09 MG/DL (ref 0.67–1.17)
CREAT SERPL-MCNC: 5.27 MG/DL (ref 0.67–1.17)
CROSSMATCH: NORMAL
EGFRCR SERPLBLD CKD-EPI 2021: 11 ML/MIN/1.73M2
EGFRCR SERPLBLD CKD-EPI 2021: 11 ML/MIN/1.73M2
EGFRCR SERPLBLD CKD-EPI 2021: 12 ML/MIN/1.73M2
EGFRCR SERPLBLD CKD-EPI 2021: 14 ML/MIN/1.73M2
ERYTHROCYTE [DISTWIDTH] IN BLOOD BY AUTOMATED COUNT: 18 % (ref 10–15)
ERYTHROCYTE [DISTWIDTH] IN BLOOD BY AUTOMATED COUNT: 18 % (ref 10–15)
ERYTHROCYTE [DISTWIDTH] IN BLOOD BY AUTOMATED COUNT: 18.1 % (ref 10–15)
ERYTHROCYTE [DISTWIDTH] IN BLOOD BY AUTOMATED COUNT: 18.3 % (ref 10–15)
FIBRINOGEN PPP-MCNC: 452 MG/DL (ref 170–490)
FIBRINOGEN PPP-MCNC: 473 MG/DL (ref 170–490)
FIBRINOGEN PPP-MCNC: 475 MG/DL (ref 170–490)
FIBRINOGEN PPP-MCNC: 506 MG/DL (ref 170–490)
FIBRINOGEN PPP-MCNC: 509 MG/DL (ref 170–490)
FIBRINOGEN PPP-MCNC: 555 MG/DL (ref 170–490)
GLUCOSE BLDC GLUCOMTR-MCNC: 111 MG/DL (ref 70–99)
GLUCOSE BLDC GLUCOMTR-MCNC: 144 MG/DL (ref 70–99)
GLUCOSE BLDC GLUCOMTR-MCNC: 149 MG/DL (ref 70–99)
GLUCOSE SERPL-MCNC: 118 MG/DL (ref 70–99)
GLUCOSE SERPL-MCNC: 119 MG/DL (ref 70–99)
GLUCOSE SERPL-MCNC: 134 MG/DL (ref 70–99)
GLUCOSE SERPL-MCNC: 145 MG/DL (ref 70–99)
GLUCOSE SERPL-MCNC: 148 MG/DL (ref 70–99)
GLUCOSE SERPL-MCNC: 156 MG/DL (ref 70–99)
HCO3 BLD-SCNC: 18 MMOL/L (ref 21–28)
HCO3 BLD-SCNC: 19 MMOL/L (ref 21–28)
HCO3 SERPL-SCNC: 17 MMOL/L (ref 22–29)
HCO3 SERPL-SCNC: 18 MMOL/L (ref 22–29)
HCO3 SERPL-SCNC: 18 MMOL/L (ref 22–29)
HCT VFR BLD AUTO: 23.5 % (ref 40–53)
HCT VFR BLD AUTO: 24.4 % (ref 40–53)
HCT VFR BLD AUTO: 26.4 % (ref 40–53)
HCT VFR BLD AUTO: 27 % (ref 40–53)
HCT VFR BLD AUTO: 27.6 % (ref 40–53)
HCT VFR BLD AUTO: 27.7 % (ref 40–53)
HGB BLD-MCNC: 7.5 G/DL (ref 13.3–17.7)
HGB BLD-MCNC: 7.9 G/DL (ref 13.3–17.7)
HGB BLD-MCNC: 8.8 G/DL (ref 13.3–17.7)
HGB BLD-MCNC: 9 G/DL (ref 13.3–17.7)
HGB BLD-MCNC: 9.1 G/DL (ref 13.3–17.7)
HGB BLD-MCNC: 9.1 G/DL (ref 13.3–17.7)
INR PPP: 1.45 (ref 0.85–1.15)
INR PPP: 1.52 (ref 0.85–1.15)
INR PPP: 1.59 (ref 0.85–1.15)
INR PPP: 1.59 (ref 0.85–1.15)
INR PPP: 1.61 (ref 0.85–1.15)
INR PPP: 1.62 (ref 0.85–1.15)
ISSUE DATE AND TIME: NORMAL
LACTATE SERPL-SCNC: 1.1 MMOL/L (ref 0.7–2)
LACTATE SERPL-SCNC: 1.4 MMOL/L (ref 0.7–2)
LVEF ECHO: NORMAL
MAGNESIUM SERPL-MCNC: 2.1 MG/DL (ref 1.7–2.3)
MAGNESIUM SERPL-MCNC: 2.2 MG/DL (ref 1.7–2.3)
MAGNESIUM SERPL-MCNC: 2.2 MG/DL (ref 1.7–2.3)
MCH RBC QN AUTO: 32.8 PG (ref 26.5–33)
MCH RBC QN AUTO: 32.8 PG (ref 26.5–33)
MCH RBC QN AUTO: 33 PG (ref 26.5–33)
MCH RBC QN AUTO: 33.5 PG (ref 26.5–33)
MCHC RBC AUTO-ENTMCNC: 31.9 G/DL (ref 31.5–36.5)
MCHC RBC AUTO-ENTMCNC: 32.4 G/DL (ref 31.5–36.5)
MCHC RBC AUTO-ENTMCNC: 32.9 G/DL (ref 31.5–36.5)
MCHC RBC AUTO-ENTMCNC: 33 G/DL (ref 31.5–36.5)
MCHC RBC AUTO-ENTMCNC: 33.3 G/DL (ref 31.5–36.5)
MCHC RBC AUTO-ENTMCNC: 33.3 G/DL (ref 31.5–36.5)
MCV RBC AUTO: 100 FL (ref 78–100)
MCV RBC AUTO: 101 FL (ref 78–100)
MCV RBC AUTO: 102 FL (ref 78–100)
MCV RBC AUTO: 103 FL (ref 78–100)
O2/TOTAL GAS SETTING VFR VENT: 40 %
O2/TOTAL GAS SETTING VFR VENT: 40 %
O2/TOTAL GAS SETTING VFR VENT: 50 %
O2/TOTAL GAS SETTING VFR VENT: 50 %
O2/TOTAL GAS SETTING VFR VENT: 60 %
PCO2 BLD: 38 MM HG (ref 35–45)
PCO2 BLD: 38 MM HG (ref 35–45)
PCO2 BLD: 39 MM HG (ref 35–45)
PCO2 BLD: 40 MM HG (ref 35–45)
PCO2 BLD: 40 MM HG (ref 35–45)
PH BLD: 7.27 [PH] (ref 7.35–7.45)
PH BLD: 7.28 [PH] (ref 7.35–7.45)
PH BLD: 7.3 [PH] (ref 7.35–7.45)
PH BLD: 7.31 [PH] (ref 7.35–7.45)
PH BLD: 7.31 [PH] (ref 7.35–7.45)
PHOSPHATE SERPL-MCNC: 3 MG/DL (ref 2.5–4.5)
PHOSPHATE SERPL-MCNC: 3.2 MG/DL (ref 2.5–4.5)
PHOSPHATE SERPL-MCNC: 3.3 MG/DL (ref 2.5–4.5)
PHOSPHATE SERPL-MCNC: 3.3 MG/DL (ref 2.5–4.5)
PHOSPHATE SERPL-MCNC: 3.4 MG/DL (ref 2.5–4.5)
PHOSPHATE SERPL-MCNC: 3.5 MG/DL (ref 2.5–4.5)
PLATELET # BLD AUTO: 102 10E3/UL (ref 150–450)
PLATELET # BLD AUTO: 102 10E3/UL (ref 150–450)
PLATELET # BLD AUTO: 66 10E3/UL (ref 150–450)
PLATELET # BLD AUTO: 92 10E3/UL (ref 150–450)
PLATELET # BLD AUTO: 94 10E3/UL (ref 150–450)
PLATELET # BLD AUTO: 94 10E3/UL (ref 150–450)
PO2 BLD: 102 MM HG (ref 80–105)
PO2 BLD: 107 MM HG (ref 80–105)
PO2 BLD: 61 MM HG (ref 80–105)
PO2 BLD: 77 MM HG (ref 80–105)
PO2 BLD: 89 MM HG (ref 80–105)
POTASSIUM SERPL-SCNC: 4.2 MMOL/L (ref 3.4–5.3)
POTASSIUM SERPL-SCNC: 4.3 MMOL/L (ref 3.4–5.3)
POTASSIUM SERPL-SCNC: 4.3 MMOL/L (ref 3.4–5.3)
POTASSIUM SERPL-SCNC: 4.4 MMOL/L (ref 3.4–5.3)
PREALB SERPL IA-MCNC: 6 MG/DL (ref 15–45)
PROT SERPL-MCNC: 3.3 G/DL (ref 6.4–8.3)
PROT SERPL-MCNC: 3.4 G/DL (ref 6.4–8.3)
PROT SERPL-MCNC: 3.5 G/DL (ref 6.4–8.3)
PROT SERPL-MCNC: 4.2 G/DL (ref 6.4–8.3)
RBC # BLD AUTO: 2.29 10E6/UL (ref 4.4–5.9)
RBC # BLD AUTO: 2.41 10E6/UL (ref 4.4–5.9)
RBC # BLD AUTO: 2.63 10E6/UL (ref 4.4–5.9)
RBC # BLD AUTO: 2.69 10E6/UL (ref 4.4–5.9)
RBC # BLD AUTO: 2.72 10E6/UL (ref 4.4–5.9)
RBC # BLD AUTO: 2.76 10E6/UL (ref 4.4–5.9)
SAO2 % BLDA: 89 % (ref 92–100)
SAO2 % BLDA: 94 % (ref 92–100)
SAO2 % BLDA: 96 % (ref 92–100)
SAO2 % BLDA: 97 % (ref 92–100)
SAO2 % BLDA: 97 % (ref 92–100)
SODIUM SERPL-SCNC: 139 MMOL/L (ref 135–145)
SODIUM SERPL-SCNC: 139 MMOL/L (ref 135–145)
SODIUM SERPL-SCNC: 140 MMOL/L (ref 135–145)
SPECIMEN EXPIRATION DATE: NORMAL
UNIT ABO/RH: NORMAL
UNIT NUMBER: NORMAL
UNIT STATUS: NORMAL
UNIT TYPE ISBT: 5100
UNIT TYPE ISBT: 5100
UNIT TYPE ISBT: 600
UNIT TYPE ISBT: 6200
WBC # BLD AUTO: 4.7 10E3/UL (ref 4–11)
WBC # BLD AUTO: 5.7 10E3/UL (ref 4–11)
WBC # BLD AUTO: 6.1 10E3/UL (ref 4–11)
WBC # BLD AUTO: 7.2 10E3/UL (ref 4–11)
WBC # BLD AUTO: 7.6 10E3/UL (ref 4–11)
WBC # BLD AUTO: 7.7 10E3/UL (ref 4–11)

## 2023-09-28 PROCEDURE — 82040 ASSAY OF SERUM ALBUMIN: CPT | Performed by: PHYSICIAN ASSISTANT

## 2023-09-28 PROCEDURE — P9016 RBC LEUKOCYTES REDUCED: HCPCS | Performed by: PHARMACIST

## 2023-09-28 PROCEDURE — 84100 ASSAY OF PHOSPHORUS: CPT | Performed by: PHYSICIAN ASSISTANT

## 2023-09-28 PROCEDURE — 93321 DOPPLER ECHO F-UP/LMTD STD: CPT | Mod: 26 | Performed by: INTERNAL MEDICINE

## 2023-09-28 PROCEDURE — 86900 BLOOD TYPING SEROLOGIC ABO: CPT | Performed by: PHARMACIST

## 2023-09-28 PROCEDURE — P9045 ALBUMIN (HUMAN), 5%, 250 ML: HCPCS | Mod: JZ

## 2023-09-28 PROCEDURE — 85730 THROMBOPLASTIN TIME PARTIAL: CPT | Performed by: PHYSICIAN ASSISTANT

## 2023-09-28 PROCEDURE — 99291 CRITICAL CARE FIRST HOUR: CPT | Mod: 25 | Performed by: SURGERY

## 2023-09-28 PROCEDURE — 85384 FIBRINOGEN ACTIVITY: CPT | Performed by: PHYSICIAN ASSISTANT

## 2023-09-28 PROCEDURE — 250N000011 HC RX IP 250 OP 636: Performed by: STUDENT IN AN ORGANIZED HEALTH CARE EDUCATION/TRAINING PROGRAM

## 2023-09-28 PROCEDURE — 258N000003 HC RX IP 258 OP 636: Performed by: STUDENT IN AN ORGANIZED HEALTH CARE EDUCATION/TRAINING PROGRAM

## 2023-09-28 PROCEDURE — 84134 ASSAY OF PREALBUMIN: CPT | Performed by: SURGERY

## 2023-09-28 PROCEDURE — 87106 FUNGI IDENTIFICATION YEAST: CPT

## 2023-09-28 PROCEDURE — 82805 BLOOD GASES W/O2 SATURATION: CPT | Performed by: PHARMACIST

## 2023-09-28 PROCEDURE — 255N000002 HC RX 255 OP 636: Performed by: STUDENT IN AN ORGANIZED HEALTH CARE EDUCATION/TRAINING PROGRAM

## 2023-09-28 PROCEDURE — 250N000013 HC RX MED GY IP 250 OP 250 PS 637: Performed by: STUDENT IN AN ORGANIZED HEALTH CARE EDUCATION/TRAINING PROGRAM

## 2023-09-28 PROCEDURE — 85610 PROTHROMBIN TIME: CPT | Performed by: PHYSICIAN ASSISTANT

## 2023-09-28 PROCEDURE — 82330 ASSAY OF CALCIUM: CPT | Performed by: PHARMACIST

## 2023-09-28 PROCEDURE — 93308 TTE F-UP OR LMTD: CPT | Mod: 26 | Performed by: INTERNAL MEDICINE

## 2023-09-28 PROCEDURE — 250N000011 HC RX IP 250 OP 636

## 2023-09-28 PROCEDURE — 99024 POSTOP FOLLOW-UP VISIT: CPT | Performed by: NURSE PRACTITIONER

## 2023-09-28 PROCEDURE — 83735 ASSAY OF MAGNESIUM: CPT | Performed by: PHYSICIAN ASSISTANT

## 2023-09-28 PROCEDURE — 250N000009 HC RX 250: Performed by: SURGERY

## 2023-09-28 PROCEDURE — 250N000011 HC RX IP 250 OP 636: Mod: JZ

## 2023-09-28 PROCEDURE — P9037 PLATE PHERES LEUKOREDU IRRAD: HCPCS | Performed by: PHARMACIST

## 2023-09-28 PROCEDURE — 258N000003 HC RX IP 258 OP 636

## 2023-09-28 PROCEDURE — 85014 HEMATOCRIT: CPT | Performed by: PHYSICIAN ASSISTANT

## 2023-09-28 PROCEDURE — 31624 DX BRONCHOSCOPE/LAVAGE: CPT | Performed by: SURGERY

## 2023-09-28 PROCEDURE — 999N000157 HC STATISTIC RCP TIME EA 10 MIN

## 2023-09-28 PROCEDURE — 0BC78ZZ EXTIRPATION OF MATTER FROM LEFT MAIN BRONCHUS, VIA NATURAL OR ARTIFICIAL OPENING ENDOSCOPIC: ICD-10-PCS | Performed by: SURGERY

## 2023-09-28 PROCEDURE — 82374 ASSAY BLOOD CARBON DIOXIDE: CPT | Performed by: PHYSICIAN ASSISTANT

## 2023-09-28 PROCEDURE — 200N000002 HC R&B ICU UMMC

## 2023-09-28 PROCEDURE — 250N000011 HC RX IP 250 OP 636: Performed by: PHARMACIST

## 2023-09-28 PROCEDURE — 31624 DX BRONCHOSCOPE/LAVAGE: CPT | Mod: GC | Performed by: SURGERY

## 2023-09-28 PROCEDURE — 71045 X-RAY EXAM CHEST 1 VIEW: CPT | Mod: 26 | Performed by: RADIOLOGY

## 2023-09-28 PROCEDURE — P9037 PLATE PHERES LEUKOREDU IRRAD: HCPCS | Performed by: PHYSICIAN ASSISTANT

## 2023-09-28 PROCEDURE — 31624 DX BRONCHOSCOPE/LAVAGE: CPT

## 2023-09-28 PROCEDURE — 93325 DOPPLER ECHO COLOR FLOW MAPG: CPT | Mod: 26 | Performed by: INTERNAL MEDICINE

## 2023-09-28 PROCEDURE — 83605 ASSAY OF LACTIC ACID: CPT | Performed by: PHARMACIST

## 2023-09-28 PROCEDURE — 86923 COMPATIBILITY TEST ELECTRIC: CPT | Performed by: PHARMACIST

## 2023-09-28 PROCEDURE — 250N000011 HC RX IP 250 OP 636: Performed by: SURGERY

## 2023-09-28 PROCEDURE — 80053 COMPREHEN METABOLIC PANEL: CPT | Performed by: SURGERY

## 2023-09-28 PROCEDURE — 71045 X-RAY EXAM CHEST 1 VIEW: CPT

## 2023-09-28 PROCEDURE — 94003 VENT MGMT INPAT SUBQ DAY: CPT

## 2023-09-28 PROCEDURE — C9113 INJ PANTOPRAZOLE SODIUM, VIA: HCPCS | Mod: JZ | Performed by: STUDENT IN AN ORGANIZED HEALTH CARE EDUCATION/TRAINING PROGRAM

## 2023-09-28 PROCEDURE — 999N000065 XR CHEST PORT 1 VIEW

## 2023-09-28 PROCEDURE — 86923 COMPATIBILITY TEST ELECTRIC: CPT

## 2023-09-28 PROCEDURE — 250N000011 HC RX IP 250 OP 636: Mod: JZ | Performed by: STUDENT IN AN ORGANIZED HEALTH CARE EDUCATION/TRAINING PROGRAM

## 2023-09-28 PROCEDURE — 80069 RENAL FUNCTION PANEL: CPT | Performed by: PHYSICIAN ASSISTANT

## 2023-09-28 RX ORDER — SODIUM CHLORIDE, SODIUM LACTATE, POTASSIUM CHLORIDE, CALCIUM CHLORIDE 600; 310; 30; 20 MG/100ML; MG/100ML; MG/100ML; MG/100ML
INJECTION, SOLUTION INTRAVENOUS CONTINUOUS
Status: DISCONTINUED | OUTPATIENT
Start: 2023-09-28 | End: 2023-09-29

## 2023-09-28 RX ORDER — FENTANYL CITRATE 50 UG/ML
100 INJECTION, SOLUTION INTRAMUSCULAR; INTRAVENOUS
Status: COMPLETED | OUTPATIENT
Start: 2023-09-28 | End: 2023-09-28

## 2023-09-28 RX ORDER — CALCIUM GLUCONATE 20 MG/ML
2 INJECTION, SOLUTION INTRAVENOUS ONCE
Status: COMPLETED | OUTPATIENT
Start: 2023-09-28 | End: 2023-09-28

## 2023-09-28 RX ADMIN — PROPOFOL 15 MCG/KG/MIN: 10 INJECTION, EMULSION INTRAVENOUS at 20:56

## 2023-09-28 RX ADMIN — VASOPRESSIN 1.2 UNITS/HR: 20 INJECTION INTRAVENOUS at 05:29

## 2023-09-28 RX ADMIN — PANTOPRAZOLE SODIUM 40 MG: 40 INJECTION, POWDER, FOR SOLUTION INTRAVENOUS at 08:16

## 2023-09-28 RX ADMIN — ALBUMIN HUMAN 25 G: 0.05 INJECTION, SOLUTION INTRAVENOUS at 14:21

## 2023-09-28 RX ADMIN — PROPOFOL 20 MCG/KG/MIN: 10 INJECTION, EMULSION INTRAVENOUS at 10:14

## 2023-09-28 RX ADMIN — VASOPRESSIN 2.4 UNITS/HR: 20 INJECTION INTRAVENOUS at 13:48

## 2023-09-28 RX ADMIN — CEFEPIME HYDROCHLORIDE 2 G: 2 INJECTION, POWDER, FOR SOLUTION INTRAVENOUS at 21:25

## 2023-09-28 RX ADMIN — CALCIUM GLUCONATE 2 G: 20 INJECTION, SOLUTION INTRAVENOUS at 08:15

## 2023-09-28 RX ADMIN — PROPOFOL 20 MCG/KG/MIN: 10 INJECTION, EMULSION INTRAVENOUS at 01:45

## 2023-09-28 RX ADMIN — ALBUMIN HUMAN 25 G: 0.05 INJECTION, SOLUTION INTRAVENOUS at 17:23

## 2023-09-28 RX ADMIN — DEXTROSE AND SODIUM CHLORIDE: 5; 900 INJECTION, SOLUTION INTRAVENOUS at 11:20

## 2023-09-28 RX ADMIN — Medication 125 MCG/HR: at 23:28

## 2023-09-28 RX ADMIN — DEXTROSE AND SODIUM CHLORIDE: 5; 900 INJECTION, SOLUTION INTRAVENOUS at 01:11

## 2023-09-28 RX ADMIN — CHLORHEXIDINE GLUCONATE 0.12% ORAL RINSE 15 ML: 1.2 LIQUID ORAL at 20:22

## 2023-09-28 RX ADMIN — FENTANYL CITRATE 100 MCG: 50 INJECTION, SOLUTION INTRAMUSCULAR; INTRAVENOUS at 14:44

## 2023-09-28 RX ADMIN — Medication 125 MCG/HR: at 04:08

## 2023-09-28 RX ADMIN — CHLORHEXIDINE GLUCONATE 0.12% ORAL RINSE 15 ML: 1.2 LIQUID ORAL at 08:16

## 2023-09-28 RX ADMIN — CALCIUM GLUCONATE 2 G: 20 INJECTION, SOLUTION INTRAVENOUS at 12:16

## 2023-09-28 RX ADMIN — NOREPINEPHRINE BITARTRATE 0.1 MCG/KG/MIN: 0.06 INJECTION, SOLUTION INTRAVENOUS at 04:04

## 2023-09-28 RX ADMIN — METRONIDAZOLE 500 MG: 5 INJECTION, SOLUTION INTRAVENOUS at 10:14

## 2023-09-28 RX ADMIN — METRONIDAZOLE 500 MG: 5 INJECTION, SOLUTION INTRAVENOUS at 20:23

## 2023-09-28 RX ADMIN — HUMAN ALBUMIN MICROSPHERES AND PERFLUTREN 5 ML: 10; .22 INJECTION, SOLUTION INTRAVENOUS at 09:19

## 2023-09-28 RX ADMIN — CALCIUM GLUCONATE: 98 INJECTION, SOLUTION INTRAVENOUS at 20:30

## 2023-09-28 RX ADMIN — SODIUM CHLORIDE, POTASSIUM CHLORIDE, SODIUM LACTATE AND CALCIUM CHLORIDE: 600; 310; 30; 20 INJECTION, SOLUTION INTRAVENOUS at 13:42

## 2023-09-28 RX ADMIN — VASOPRESSIN 2.4 UNITS/HR: 20 INJECTION INTRAVENOUS at 22:40

## 2023-09-28 RX ADMIN — VASOPRESSIN 2.4 UNITS/HR: 20 INJECTION INTRAVENOUS at 20:56

## 2023-09-28 ASSESSMENT — ACTIVITIES OF DAILY LIVING (ADL)
ADLS_ACUITY_SCORE: 43

## 2023-09-28 NOTE — PROVIDER NOTIFICATION
During handoff report with bedside nightshift RN, we looked at the abdominal wound vac.  The wound vac foam wasn't to the border of the wound.  Intestine was visible through the plastic covering of the wound vac on the lower mid to lower right portions of the wound.  I contacted the SICU PA to assess at bedside.  She immediately contacted the vascular surgery team.  I went to the SICU workroom to get the overall staff to evaluate at bedside.  Vascular surgery recommended placing an abdominal binder, which was placed on pt as soon as possible.  RN advocated during rounds for pt returning to OR to fix wound vac.  Awaiting orders/decision from primary team.

## 2023-09-28 NOTE — PLAN OF CARE
Neuro: Intermittently opens eyes with activity, no movement to stimuli. Perrla 2-3mm. Propofol and Fentanyl for sedation and comfort.    CV: SR 80-90, titrating Levo ad Vaso for MAP goal >65. x2 500cc Albumin boluses given, MIVF restarted. Tmax 100.6. LLE cold and dusky, absent pulses. CVPs 8-10.  Resp: ETT 26 at lips, CMV 40%, , PEEP 8, R 16. Bedside bronch completed.   GI: NPO. NG- LIS, TPN running. No BM.   : Lu in place, 5-10cc/hr.   Skin: Abthera in place, intestines visible , Vasc surg and SICU teams aware, dressing intact, -125 suction, 100-150cc/hr     Plan: Infuse 2u platelets, 1 unit(s) PRBC tonight. RTOR 9/29 @ 0730. Continue to wean pressors as tolerating     For vital signs and complete assessments, please see documentation flowsheets.

## 2023-09-28 NOTE — PROCEDURES
Bronchoscopy Procedure Note - 9/28/2023   Alfredo Burnham    MRN: [id number]    Indications: Hypoxemia and Chest Xray finding of increased opacification on the left lung.  Specimens: BAL   Findings:   1- Mucus Plug was removed upon entry into the left bronchus.  2- Significant thick & mucus throughout ET tube and in the bronchial tree more in the Left lung compared to Right lung.  Complications: no immediate complications   An adult flexible bronchoscope was advanced thru the adapter on the ET Tube without difficulty. The ET Tube was seen approximately 3 cm above the lissa. There were no obstructions evident. 60 mL of sterile saline were introduced via the bronchoscope and gently suctioned away. There was no trauma or collapse of the bronchi visualized. BAL was collected. Once the Left bronchus cleared from thick secretions, Right was inspected and encountered with minimal thin secretions which were suctioned out.  The patient tolerated the procedure well and there were no immediate complications.   A follow up CXR was ordered and reviewed which showed no PTX and opacification of the left lung improved compared to the previous CXR.    Dr. Escobedo was available for the procedure.

## 2023-09-28 NOTE — PROGRESS NOTES
"Bronchoscopy Risk Assessment Guidelines      A. Patient symptoms to consider when assessing pulmonary TB risk are:    I. Cough greater than 3 weeks; and fever, hemoptysis, pleuritic chest    pain, weight loss greater than 10 lbs, night sweats, fatigue, infiltrates on    upper lobes or superior segments of lower lobes, cavitation on chest    x-ray.   B. Patient risk factors to consider when assessing pulmonary TB risk are:    I. Exposure to known TB case, foreign-born persons (within 5 years of    arrival to [place]), residence in a crowded setting (correctional facility,     long-term care center, etc.), persons with HIV or immunosuppression.    Patients with symptoms and risk factors should generally be considered \"suspect risk\" and bronchoscopies should be performed in airborne precautions.    This patient has NO KNOWN RISK of Tuberculosis (proceed with bronchoscopy)    Specimens sent: yes  Complications: None  Scope used: #[id number]  Slim  Attending Physician: Dr. Gregg Yusuf, RT on 9/28/2023 at 12:49 PM  "

## 2023-09-28 NOTE — PROGRESS NOTES
Vascular Surgery Progress Note  09/28/2023       Subjective:  Continues to move upper extremities spontaneously, not on commands. Remains on norepinephrine for hemodynamic support. Did not tolerate having the vasopressin stopped overnight. Rising serum creatinine, with CVP 5-7. Continues to be hemodynamically unstable, with metabolic acidosis, base deficit -7, worsening pH at 7.28 and bicarb at 19.  Received a dose of platelets yesterday, approximately 4 L of crystalloids and 2L of continuous infusions.     Objective:  Temp:  [99.1  F (37.3  C)-99.9  F (37.7  C)] 99.9  F (37.7  C)  Pulse:  [] 91  Resp:  [11-16] 16  BP: (115)/(60) 115/60  MAP:  [62 mmHg-112 mmHg] 69 mmHg  Arterial Line BP: ()/(47-70) 105/51  FiO2 (%):  [40 %-50 %] 50 %  SpO2:  [90 %-100 %] 94 %    I/O last 3 completed shifts:  In: 8603.74 [I.V.:4918.99; IV Piggyback:3000]  Out: 3790 [Urine:340; Emesis/NG output:850; Drains:2600]      Gen: intubated, sedated, moves upper extremities spontaneously, not to command  CV: RR per radial pulse, off pressors, sinus tach per tele  Resp: intubated, on ventilator  Abd: wound vac in place, holding seal, abdomen somewhat soft to palpation around VAC.    Ext: RLE wwp, mulitphasic DP and PT signal. LLE cool to touch,somewhat dusky. no DP/PT signals, no popliteal signal this morning. Left femoral with doppler signal. Compartments soft, compressible.     UOP: approximately 10ml/hr   Temp abd closure output: ~ 100-150ml/hr       Labs:  Recent Labs   Lab 09/28/23 0753 09/28/23 0331 09/27/23 2331   WBC 7.7 7.2 6.1   HGB 9.1* 9.0* 8.8*   PLT 94* 102* 102*       Recent Labs   Lab 09/28/23 0759 09/28/23 0331 09/27/23 2334 09/27/23 2331 09/27/23 2019 09/27/23 2017   NA  --  139  --  139  --  139   POTASSIUM  --  4.3  --  4.4  --  4.6   CHLORIDE  --  110*  --  110*  --  111*   CO2  --  17*  --  18*  --  17*   BUN  --  33.6*  --  30.0*  --  29.4*   CR  --  4.28*  --  4.31*  --  4.15*   * 149*  148*    < > 156*   < > 130*   OMAR  --  6.4*  --  6.4*  --  6.3*   MAG  --  2.2  --  2.2  --  2.2   PHOS  --  3.4  --  3.3  --  3.6    < > = values in this interval not displayed.       Imaging:  XR Chest Port 1 View    Result Date: 9/25/2023  EXAM: XR CHEST PORT 1 VIEW  9/25/2023 4:00 AM HISTORY:  Endotracheal tube positioning   COMPARISON:  None TECHNIQUE: Portable AP supine radiograph of chest FINDINGS: Endotracheal tube tip is in the mid thoracic trachea. Right IJ CVC tip projects over the cavoatrial junction.. Enteric tube projects over the stomach. The trachea is midline. The cardiomediastinal silhouette is within normal limits. The pulmonary vasculature is distinct. No appreciable pneumothorax or pleural effusion. No focal airspace consolidation. No acute osseous abnormality.     IMPRESSION: 1. Endotracheal tube tip is in the mid thoracic trachea. 2. No acute airspace disease. Streaky perihilar opacities likely represent atelectasis/edema. I have personally reviewed the examination and initial interpretation and I agree with the findings. ILEANA SIEGEL MD   SYSTEM ID:  [id number]    XR Abdomen Port 1 View    Result Date: 9/25/2023  Exam: XR ABDOMEN PORT 1 VIEW, 9/25/2023 4:12 AM Indication: NGT in place Comparison: None Findings: Portable AP supine radiograph of the abdomen. Enteric tube tip and sidehole project over the stomach. Partially visualized central venous catheter tip projecting over the right atrium. Surgical packing material projects over the abdomen. Multiple surgical clips projecting over the abdomen. Nonobstructive bowel gas pattern. No pneumatosis or portal venous gas.     Impression: 1. Gastric tube tip and sidehole project over the stomach. Packing material present in the abdomen. 2. Nonobstructive bowel gas pattern. I have personally reviewed the examination and initial interpretation and I agree with the findings. ILEANA SIEGEL MD   SYSTEM ID:  [id number]    ADDIS with Report    Result Date:  9/25/2023  Mason Foss MD     9/25/2023  7:53 AM Perioperative ADDIS Procedure Note Staff -      Anesthesiologist:  Mason Foss MD      Performed By: anesthesiologist Preanesthesia Checklist:  Patient identified, IV assessed, risks and benefits discussed, monitors and equipment assessed, procedure being performed at surgeon's request and anesthesia consent obtained. ADDIS Probe Insertion Probe Number: Loaner 1 Probe Status PRE Insertion: NO obvious damage Probe type:  Adult 3D Bite block used:   Oral Airway Insertion Technique: Recurrent attempts, Laryngoscopy Insertion complications: None obvious Billing Report:A ADDIS report is NOT being generated. Probe Status POST Removal: NO obvious damage        Assessment/Plan:   68 year old male with PMH of HTN and previous TIA who presented with R sided abdominal pain found to have contained ruptured AAA, now s/p open AAA repair 9/24 into 9/25am with 30 min supraceliac clamp time, prolonged infrarenal clamp time, temporary abdominal closure. He remains critically ill in the ICU, although with some improvements in his lactic acidosis, he had bloody stool 9/25 am with flex sig 9/25/23, ongoing abssent signals in his LLE concerning for ischemia. We will continue to monitor LLE given patient overall clinical status is not stable enough for further intervention. CXR with left lung whiteout, potential mucus plug, consider bronch and recuitement. Rising serum creatinine, CVP 5-7 and TTE this morning demonstrating compressible IVC, less than 2 cm, likely indicative of intravascular volume deficit.     - Will plan for RTOR in the coming days, potentially tomorrow  - Consider bronch for left lung whiteout on CXR this morning, potential mucus plus; very mild cephalization noted on right lung and appreciable costophrenic angle  - Please continue with volume resuscitation, patient is intravascularly dry  - Monitor renal function and electrolytes, serum creatinine at 4.43. Please do not  initiate CRRT at this time, only at the discretion of vascular surgery attending, Dr. Lowe  - Replace iCal  - Appreciate TTE to assess volume status for objective measurement, current IVC <2cm, appreciate transducing the CVP   - Appreciate GI following given bloody stools   - Continue with bowel rest and NPO  - Broad spectrum abx to cover for gram negatives in setting of possible bowel ischemia  - Pain/sedation medication per ICU   - Continue vent support, wean as able  - Wean pressors as tolerated  - Goal MAP >65  - Recommend ongoing resuscitation, patient appears to be fluid down still based on   - Continue insulin gtt as needed  - Continue aparicio     Discussed with vascular surgery staff, Dr. Kandace Mcleod who is in agreement with the above.    Miryam Carrasco, SACHIN  Vascular Surgery  Pager: [phone]  [email or web address]  Send message or 10 digit call back number Securely via [email or web address] with the [email or web address] Web Console (learn more here)

## 2023-09-28 NOTE — PROGRESS NOTES
[facility] CLINICS  Progress Note    Shayna Washburn Patient Status:  Inpatient    9/15/1945 MRN [id number]   Location [facility] 4W/SW/SE Attending Williams Gupta MD   Hosp Day # 10 PCP None Pcp     Subjective:  Shayna Washburn is a(n) 66 yea SPIRITUAL HEALTH SERVICES Progress Note  [facility] 4E    I [facility] with Alfredo[facility] and spoke to his wife Tabby.  She asked me if there was regular mass. I told her I didn't believe there was.  I explained my purpose as part of the spiritual care team and Tabby told me Alfredo had been anointed and they were on a prayer chain as well.  There were no further needs at this time.    Eleanor Tapia  Chaplain Resident  Pager [phone]    * [facility] remains available 24/7 for emergent requests/referrals, either by having the switchboard page the on-call  or by entering an ASAP/STAT consult in Epic (this will also page the on-call ). Routine Epic consults receive an initial response within 24 hours.*     given inpatient - cycle 1 cisplatin/etoposide, completed 12/21/19  Allopurinol for TLS prophylaxis  Neupogen while inpatient- should start today as she remains at risk for neutropenia when she nadirs  Plan further treatment outpatient including radiation t

## 2023-09-28 NOTE — PLAN OF CARE
Neuro: Intermittently opening eyes with movement. Withdrawal in RLE. Minimal spontaneous movement in BUE, but no movement to command or stimuli. PERRLA 2-3mm. Titrating propofol and fent for pain/comfort management.   Cardiac: SR/ST 90-100s. Titrating levo + straight rate vaso to keep MAPs >65/-140. 1L LR bolus given. Tmax 99.9. LLE cold, dusky; groin pulses (+). CVPs 6-12.   Resp: LS clear. ETT 24 at lip. CMV/AC 50%/450/16/5. Increased FiO2 from 40 to 50 for decreasing PaO2. Minimal ETT secretions.   GI: NPO. NG to LIS (no meds). Small BM x1. TPN started at 45 mL/hr  : Aly for strict I&Os. Ouput ~10 ml/hr since 0000. SICU notified - no interventions.   Skin: Abthera site assessed by SICU. No leaking, dressing intact. Suction to -125. See flowsheets remaining skin assessments.   Muscle/Mobility: Bedrest, turn + repo Q2hrs  Pain: CPOT 0   Lines/Drains: PIV x2. L radial a-line. R internal jugular with introducer. D5 NS at 100 mL/hr    PLAN: Plan to return to OR, likely tomorrow. Wean pressors + vent as tolerated.

## 2023-09-28 NOTE — PROGRESS NOTES
SURGICAL ICU PROGRESS NOTE  09/28/2023        Date of Service (when I saw the patient): 09/28/2023    ASSESSMENT:  Alfredo Burnham is a 68 year old male who was admitted to the SICU on 9/24/2023 s/p open AAA repair. Patient has a history of HTN and previous TIA. He presented to the ED earlier today with right sided abdominal pain and was found to have a contained, ruptured AAA on CT. 30 min super-celiac clamp time. Prolonged intra- renal clamp. Now s/p flex sig showing rectal ischemia, abdominal washout showing a hemostatic AAA graft and no evidence of transmural colonic or small bowel ischemia, and an unsuccessful LLE embolectomy 9/25. Also now s/p repeat abdominal washout, retroperitoneal closure, LLE wound washout/closure 9/26. Patient remains intubated with an open abdomen, receiving MTP.      CHANGES and MAJOR THINGS TODAY:   -Echo to assess fluid status   -Replace Ca+  -Vasopressin to 2.4 and wean off norepi  -Discuss goals of care with family   -Continue fluid resuscitation as tolerated  -Follow Bronch cultures      PLAN:    Neurological:  # Acute pain   # Agitation   # Encephalopathy   - Monitor neurological status. Delirium preventions and precautions  - Sedation plan: propofol infusion @ 20 -- titrated to RASS -3  - Pain: fentanyl 125    Pulmonary:  # Post operative ventilatory support  # Acute hypoxic respiratory support   - VENT: AC-VC 70% FiO2, tidal volume 450, decrease RR to 16, PEEP 8. Continue full vent support. PST when meets criteria.  Ventilatory bundle. HOB elevation   - Satting 92-99% overnight  - Supplemental oxygen to keep saturation above 92%.  - CXR demonstrating L opacification of the isidro-field, US demonstrating minimal fluid. Bronchoscopy performed with extraction of mucus secretions. Cx sent.     Cardiovascular:    # Shock-hypovolemic  # H/o HTN  # Contained AAA rupture s/p open repair  # Suspected emboli to LLE  - Monitor hemodynamic status.   - Goal MAP >65, ideally SBP  100-140  - Wean pressors as able  - RLE wrapped in bear hugger with sheepskin boot in place  - Echo today to assess fluid status  - Vasopressin to 2.4, wean off  norepi     GI/Nutrition:    # Open abdomen  # Protein calorie deficit malnutrition, risk of  # Post-operative melena  # Rectal ischemia  - NPO  - PPI for ppx  - Zosyn for ppx of bacterial translocation  Appreciate GI recs 9/25:  -Continue NPO status   -Supportive cares for GI bleed:  -- Ensure two large bore IVs  -- Adequate volume resuscitation with IVF, pRBC  -- Maintain up to date type and screen.  -- Monitor Hgb closely. Transfuse for Hgb<7 (improved outcomes with restricted vs liberal threshold)  -- Monitor stool output.  Document color and quality.  -- Avoid NSAID  - TPN  - Planning for OR with vascular surgery on 9/29    Fluids/Electrolytes:  - LR boluses for IV fluid hydration/resuscitation as tolerated  - mIVF 100 ml/hr LR  - Replace calcium     Renal:   # Acute kidney injury  # Oliguria  #hyperkalemia resolved  - Concern for ATN  - No indication for CRRT at this time  - Urine output  ~15 mL/hr . Will continue to fluid resuscitate and monitor intake and output.  - Considering nephrology consult pending goals of care discussion with family    Endocrine:  # Stress hyperglycemia, risk of  - Insulin as needed; goal to keep BG< 180 for optimal wound healing     Infectious disease:   # filemon-operative prophylaxis   # bacterial translocation in setting of possibly ischemic bowel  - Cefepime/Flagyl for ppx for bacterial translocation    Hematology:    # Acute blood loss anemia  # Active resuscitation  - Goal hgb >8, fibrinogen >200, INR <1.5, plts >100   - Will continue active, balanced resuscitation.   - Trend coags and CBC q2hr in initial resuscitation period     Musculoskeletal:  # Weakness and deconditioning of critical illness, risk of  # Left lower limb ischemia   - Physical and occupational therapy consult when appropriate. Passive ROM at bedside  with RN  - Flat bedrest, do not break bed    General Cares/Prophylaxis:    DVT Prophylaxis: Pneumatic Compression Devices  GI Prophylaxis: PPI  Restraints: Restraints for medical healing needed: YES    Lines/ tubes/ drains:  - ETT  - Aparicio  - Abthera  - PIV x3  - R IJ  - R radial arterial line     RLE and right groin reinforced w/ strong tape     Disposition:  Surgical ICU     Patient seen, findings and plan discussed with surgical ICU staff, Dr. Escobedo.    Jenni Frypatt, MS4  Surgical ICU    ====================================  INTERVAL HISTORY:   Required levophed and vasopressin overnight for pressure support. Renal labs continue to be poor. Weaning sedation as possible.     ROS unable to be performed due to sedation and intubation.      OBJECTIVE:   1. VITAL SIGNS:   Temp:  [99.1  F (37.3  C)-99.9  F (37.7  C)] 99.9  F (37.7  C)  Pulse:  [] 93  Resp:  [11-16] 16  BP: (115)/(60) 115/60  MAP:  [62 mmHg-112 mmHg] 67 mmHg  Arterial Line BP: ()/(47-70) 98/51  FiO2 (%):  [40 %-50 %] 50 %  SpO2:  [90 %-100 %] 95 %  Vent Mode: CMV/AC  (Continuous Mandatory Ventilation/ Assist Control)  FiO2 (%): 50 %  Resp Rate (Set): 16 breaths/min  Tidal Volume (Set, mL): 450 mL  PEEP (cm H2O): 5 cmH2O  Resp: 16      2. INTAKE/ OUTPUT:   I/O last 3 completed shifts:  In: 19836.62 [I.V.:5808.87; IV Piggyback:4000]  Out: 3998 [Urine:348; Emesis/NG output:900; Drains:2750]    3. PHYSICAL EXAMINATION:  General: Intubated, sedated  HEENT: Normocephalic, atraumatic. NGT to LIS, ETT  Neuro: Sedated  Pulm/Resp: Intubated  CV: RRR  Abdomen: Bowel visible in right lower portion of wound. Open abdomen with Abthera in place, serosanguinous output, soft, moderately distended  :  aparicio catheter in place, urine yellow and clear  MSK/Extremities: LLE popliteal with doppler signal, no palpable pulse. Right palpable pedal signal, R pinky toe dusky. L lower leg and foot mottled, cold.    4. INVESTIGATIONS:   Arterial Blood Gases    Recent Labs   Lab 09/28/23 0332 09/27/23 2332 09/27/23 2018 09/27/23  0745   PH 7.30* 7.30* 7.32* 7.37   PCO2 38 38 36 33*   PO2 77* 68* 70* 116*   HCO3 19* 19* 19* 19*     Complete Blood Count   Recent Labs   Lab 09/28/23 0331 09/27/23 2331 09/27/23 2017 09/27/23  1552   WBC 7.2 6.1 6.6 5.7   HGB 9.0* 8.8* 8.6* 8.9*   * 102* 106* 106*     Basic Metabolic Panel  Recent Labs   Lab 09/28/23 0331 09/27/23 2334 09/27/23 2331 09/27/23 2019 09/27/23 2017 09/27/23  1552     --  139  --  139 139   POTASSIUM 4.3  --  4.4  --  4.6 4.7   CHLORIDE 110*  --  110*  --  111* 110*   CO2 17*  --  18*  --  17* 17*   BUN 33.6*  --  30.0*  --  29.4* 28.9*   CR 4.28*  --  4.31*  --  4.15* 3.97*   *  148* 147* 156*   < > 130* 135*  134*    < > = values in this interval not displayed.     Liver Function Tests  Recent Labs   Lab 09/28/23 0331 09/27/23 2331 09/27/23 2017 09/27/23  1552   * 914* 936* 864*   * 285* 266* 258*   ALKPHOS 69 69 54 58   BILITOTAL 1.2 1.2 1.3* 1.3*   ALBUMIN 1.5* 1.8* 1.5* 1.7*   INR 1.59* 1.61* 1.63* 1.59*     Pancreatic Enzymes  Recent Labs   Lab 09/24/23  1644   LIPASE 21     Coagulation Profile  Recent Labs   Lab 09/28/23 0331 09/27/23 2331 09/27/23 2017 09/27/23  1552   INR 1.59* 1.61* 1.63* 1.59*   PTT 40* 41* 41* 39*         5. RADIOLOGY:   No results found for this or any previous visit (from the past 24 hour(s)).    =========================================    I saw and evaluated the patient in an independent visit and agree with the student's assessment and plan as written above. I was present at all times for any mentioned procedures.     Clint Braga MD  PGY-1, Neurosurgery  Off-service on SICU

## 2023-09-29 ENCOUNTER — APPOINTMENT (OUTPATIENT)
Dept: GENERAL RADIOLOGY | Facility: CLINIC | Age: 68
DRG: 268 | End: 2023-09-29
Attending: SURGERY
Payer: COMMERCIAL

## 2023-09-29 ENCOUNTER — ANESTHESIA (OUTPATIENT)
Dept: SURGERY | Facility: CLINIC | Age: 68
DRG: 268 | End: 2023-09-29
Payer: COMMERCIAL

## 2023-09-29 LAB
ALBUMIN SERPL BCG-MCNC: 2.1 G/DL (ref 3.5–5.2)
ALBUMIN SERPL BCG-MCNC: 2.2 G/DL (ref 3.5–5.2)
ALBUMIN SERPL BCG-MCNC: 2.2 G/DL (ref 3.5–5.2)
ALBUMIN SERPL BCG-MCNC: 2.3 G/DL (ref 3.5–5.2)
ALBUMIN SERPL BCG-MCNC: 2.4 G/DL (ref 3.5–5.2)
ALBUMIN SERPL BCG-MCNC: 2.5 G/DL (ref 3.5–5.2)
ALLEN'S TEST: ABNORMAL
ALP SERPL-CCNC: 64 U/L (ref 40–129)
ALP SERPL-CCNC: 69 U/L (ref 40–129)
ALP SERPL-CCNC: 77 U/L (ref 40–129)
ALP SERPL-CCNC: 77 U/L (ref 40–129)
ALP SERPL-CCNC: 92 U/L (ref 40–129)
ALP SERPL-CCNC: 92 U/L (ref 40–129)
ALT SERPL W P-5'-P-CCNC: 37 U/L (ref 0–70)
ALT SERPL W P-5'-P-CCNC: 46 U/L (ref 0–70)
ALT SERPL W P-5'-P-CCNC: 68 U/L (ref 0–70)
ALT SERPL W P-5'-P-CCNC: 69 U/L (ref 0–70)
ALT SERPL W P-5'-P-CCNC: 85 U/L (ref 0–70)
ALT SERPL W P-5'-P-CCNC: 92 U/L (ref 0–70)
ANION GAP SERPL CALCULATED.3IONS-SCNC: 11 MMOL/L (ref 7–15)
ANION GAP SERPL CALCULATED.3IONS-SCNC: 12 MMOL/L (ref 7–15)
ANION GAP SERPL CALCULATED.3IONS-SCNC: 13 MMOL/L (ref 7–15)
ANION GAP SERPL CALCULATED.3IONS-SCNC: 13 MMOL/L (ref 7–15)
ANION GAP SERPL CALCULATED.3IONS-SCNC: 14 MMOL/L (ref 7–15)
ANION GAP SERPL CALCULATED.3IONS-SCNC: 14 MMOL/L (ref 7–15)
APTT PPP: 33 SECONDS (ref 22–38)
APTT PPP: 34 SECONDS (ref 22–38)
APTT PPP: 34 SECONDS (ref 22–38)
APTT PPP: 35 SECONDS (ref 22–38)
APTT PPP: 36 SECONDS (ref 22–38)
APTT PPP: 37 SECONDS (ref 22–38)
AST SERPL W P-5'-P-CCNC: 136 U/L (ref 0–45)
AST SERPL W P-5'-P-CCNC: 159 U/L (ref 0–45)
AST SERPL W P-5'-P-CCNC: 207 U/L (ref 0–45)
AST SERPL W P-5'-P-CCNC: 208 U/L (ref 0–45)
AST SERPL W P-5'-P-CCNC: 267 U/L (ref 0–45)
AST SERPL W P-5'-P-CCNC: 285 U/L (ref 0–45)
BASE EXCESS BLDA CALC-SCNC: -5.4 MMOL/L (ref -9–1.8)
BASE EXCESS BLDA CALC-SCNC: -5.9 MMOL/L (ref -9–1.8)
BASE EXCESS BLDA CALC-SCNC: -6.1 MMOL/L (ref -9–1.8)
BASE EXCESS BLDA CALC-SCNC: -6.3 MMOL/L (ref -9–1.8)
BASE EXCESS BLDA CALC-SCNC: -6.6 MMOL/L (ref -9–1.8)
BASE EXCESS BLDA CALC-SCNC: -8.1 MMOL/L (ref -9.6–2)
BILIRUB SERPL-MCNC: 1.3 MG/DL
BILIRUB SERPL-MCNC: 1.3 MG/DL
BILIRUB SERPL-MCNC: 1.4 MG/DL
BILIRUB SERPL-MCNC: 1.4 MG/DL
BILIRUB SERPL-MCNC: 1.6 MG/DL
BILIRUB SERPL-MCNC: 1.6 MG/DL
BLD PROD TYP BPU: NORMAL
BLD PROD TYP BPU: NORMAL
BLOOD COMPONENT TYPE: NORMAL
BLOOD COMPONENT TYPE: NORMAL
BUN SERPL-MCNC: 48.8 MG/DL (ref 8–23)
BUN SERPL-MCNC: 50.3 MG/DL (ref 8–23)
BUN SERPL-MCNC: 57 MG/DL (ref 8–23)
BUN SERPL-MCNC: 58.3 MG/DL (ref 8–23)
BUN SERPL-MCNC: 63.5 MG/DL (ref 8–23)
BUN SERPL-MCNC: 67 MG/DL (ref 8–23)
CA-I BLD-MCNC: 4.2 MG/DL (ref 4.4–5.2)
CA-I BLD-MCNC: 4.3 MG/DL (ref 4.4–5.2)
CA-I BLD-MCNC: 4.4 MG/DL (ref 4.4–5.2)
CA-I BLD-MCNC: 4.4 MG/DL (ref 4.4–5.2)
CA-I BLD-MCNC: 4.6 MG/DL (ref 4.4–5.2)
CA-I BLD-MCNC: 4.6 MG/DL (ref 4.4–5.2)
CA-I BLD-MCNC: 4.8 MG/DL (ref 4.4–5.2)
CALCIUM SERPL-MCNC: 7.4 MG/DL (ref 8.8–10.2)
CALCIUM SERPL-MCNC: 7.5 MG/DL (ref 8.8–10.2)
CALCIUM SERPL-MCNC: 7.7 MG/DL (ref 8.8–10.2)
CALCIUM SERPL-MCNC: 7.8 MG/DL (ref 8.8–10.2)
CALCIUM SERPL-MCNC: 7.8 MG/DL (ref 8.8–10.2)
CALCIUM SERPL-MCNC: 8 MG/DL (ref 8.8–10.2)
CHLORIDE SERPL-SCNC: 108 MMOL/L (ref 98–107)
CHLORIDE SERPL-SCNC: 109 MMOL/L (ref 98–107)
CHLORIDE SERPL-SCNC: 109 MMOL/L (ref 98–107)
CHLORIDE SERPL-SCNC: 110 MMOL/L (ref 98–107)
CK SERPL-CCNC: 1503 U/L (ref 39–308)
CODING SYSTEM: NORMAL
CODING SYSTEM: NORMAL
CREAT SERPL-MCNC: 5.16 MG/DL (ref 0.67–1.17)
CREAT SERPL-MCNC: 5.49 MG/DL (ref 0.67–1.17)
CREAT SERPL-MCNC: 5.67 MG/DL (ref 0.67–1.17)
CREAT SERPL-MCNC: 5.77 MG/DL (ref 0.67–1.17)
CREAT SERPL-MCNC: 5.79 MG/DL (ref 0.67–1.17)
CREAT SERPL-MCNC: 5.96 MG/DL (ref 0.67–1.17)
CROSSMATCH: NORMAL
CROSSMATCH: NORMAL
DEPRECATED HCO3 PLAS-SCNC: 17 MMOL/L (ref 22–29)
DEPRECATED HCO3 PLAS-SCNC: 17 MMOL/L (ref 22–29)
DEPRECATED HCO3 PLAS-SCNC: 18 MMOL/L (ref 22–29)
EGFRCR SERPLBLD CKD-EPI 2021: 10 ML/MIN/1.73M2
EGFRCR SERPLBLD CKD-EPI 2021: 11 ML/MIN/1.73M2
ERYTHROCYTE [DISTWIDTH] IN BLOOD BY AUTOMATED COUNT: 17.8 % (ref 10–15)
ERYTHROCYTE [DISTWIDTH] IN BLOOD BY AUTOMATED COUNT: 18 % (ref 10–15)
ERYTHROCYTE [DISTWIDTH] IN BLOOD BY AUTOMATED COUNT: 18.1 % (ref 10–15)
ERYTHROCYTE [DISTWIDTH] IN BLOOD BY AUTOMATED COUNT: 18.1 % (ref 10–15)
FIBRINOGEN PPP-MCNC: 490 MG/DL (ref 170–490)
FIBRINOGEN PPP-MCNC: 495 MG/DL (ref 170–490)
FIBRINOGEN PPP-MCNC: 496 MG/DL (ref 170–490)
FIBRINOGEN PPP-MCNC: 521 MG/DL (ref 170–490)
FIBRINOGEN PPP-MCNC: 523 MG/DL (ref 170–490)
FIBRINOGEN PPP-MCNC: 540 MG/DL (ref 170–490)
GLUCOSE BLD-MCNC: 120 MG/DL (ref 70–99)
GLUCOSE BLDC GLUCOMTR-MCNC: 107 MG/DL (ref 70–99)
GLUCOSE BLDC GLUCOMTR-MCNC: 108 MG/DL (ref 70–99)
GLUCOSE BLDC GLUCOMTR-MCNC: 121 MG/DL (ref 70–99)
GLUCOSE BLDC GLUCOMTR-MCNC: 130 MG/DL (ref 70–99)
GLUCOSE SERPL-MCNC: 119 MG/DL (ref 70–99)
GLUCOSE SERPL-MCNC: 120 MG/DL (ref 70–99)
GLUCOSE SERPL-MCNC: 121 MG/DL (ref 70–99)
GLUCOSE SERPL-MCNC: 122 MG/DL (ref 70–99)
GLUCOSE SERPL-MCNC: 126 MG/DL (ref 70–99)
GLUCOSE SERPL-MCNC: 128 MG/DL (ref 70–99)
HCO3 BLD-SCNC: 19 MMOL/L (ref 21–28)
HCO3 BLD-SCNC: 20 MMOL/L (ref 21–28)
HCO3 BLD-SCNC: 20 MMOL/L (ref 21–28)
HCO3 BLDA-SCNC: 18 MMOL/L (ref 21–28)
HCT VFR BLD AUTO: 24.1 % (ref 40–53)
HCT VFR BLD AUTO: 25.1 % (ref 40–53)
HCT VFR BLD AUTO: 25.5 % (ref 40–53)
HCT VFR BLD AUTO: 25.9 % (ref 40–53)
HCT VFR BLD AUTO: 26.1 % (ref 40–53)
HCT VFR BLD AUTO: 27 % (ref 40–53)
HGB BLD-MCNC: 7.9 G/DL (ref 13.3–17.7)
HGB BLD-MCNC: 8.1 G/DL (ref 13.3–17.7)
HGB BLD-MCNC: 8.2 G/DL (ref 13.3–17.7)
HGB BLD-MCNC: 8.4 G/DL (ref 13.3–17.7)
HGB BLD-MCNC: 8.5 G/DL (ref 13.3–17.7)
HGB BLD-MCNC: 8.6 G/DL (ref 13.3–17.7)
HGB BLD-MCNC: 9 G/DL (ref 13.3–17.7)
INR PPP: 1.33 (ref 0.85–1.15)
INR PPP: 1.34 (ref 0.85–1.15)
INR PPP: 1.37 (ref 0.85–1.15)
INR PPP: 1.4 (ref 0.85–1.15)
ISSUE DATE AND TIME: NORMAL
ISSUE DATE AND TIME: NORMAL
LACTATE BLD-SCNC: 1 MMOL/L
LACTATE SERPL-SCNC: 1 MMOL/L (ref 0.7–2)
LACTATE SERPL-SCNC: 1 MMOL/L (ref 0.7–2)
LACTATE SERPL-SCNC: 1.1 MMOL/L (ref 0.7–2)
LACTATE SERPL-SCNC: 1.1 MMOL/L (ref 0.7–2)
LACTATE SERPL-SCNC: 1.2 MMOL/L (ref 0.7–2)
LACTATE SERPL-SCNC: 1.2 MMOL/L (ref 0.7–2)
MAGNESIUM SERPL-MCNC: 2 MG/DL (ref 1.7–2.3)
MAGNESIUM SERPL-MCNC: 2.1 MG/DL (ref 1.7–2.3)
MCH RBC QN AUTO: 32.5 PG (ref 26.5–33)
MCH RBC QN AUTO: 32.6 PG (ref 26.5–33)
MCH RBC QN AUTO: 32.8 PG (ref 26.5–33)
MCH RBC QN AUTO: 32.8 PG (ref 26.5–33)
MCH RBC QN AUTO: 32.9 PG (ref 26.5–33)
MCH RBC QN AUTO: 33.1 PG (ref 26.5–33)
MCHC RBC AUTO-ENTMCNC: 31.9 G/DL (ref 31.5–36.5)
MCHC RBC AUTO-ENTMCNC: 32.2 G/DL (ref 31.5–36.5)
MCHC RBC AUTO-ENTMCNC: 32.3 G/DL (ref 31.5–36.5)
MCHC RBC AUTO-ENTMCNC: 32.4 G/DL (ref 31.5–36.5)
MCHC RBC AUTO-ENTMCNC: 32.6 G/DL (ref 31.5–36.5)
MCHC RBC AUTO-ENTMCNC: 32.8 G/DL (ref 31.5–36.5)
MCV RBC AUTO: 101 FL (ref 78–100)
MCV RBC AUTO: 102 FL (ref 78–100)
O2/TOTAL GAS SETTING VFR VENT: 40 %
O2/TOTAL GAS SETTING VFR VENT: 61 %
OXYHGB MFR BLD: 94 % (ref 92–100)
OXYHGB MFR BLD: 95 % (ref 92–100)
OXYHGB MFR BLD: 96 % (ref 92–100)
OXYHGB MFR BLD: 97 % (ref 92–100)
OXYHGB MFR BLD: 98 % (ref 92–100)
PATH REPORT.COMMENTS IMP SPEC: NORMAL
PATH REPORT.COMMENTS IMP SPEC: NORMAL
PATH REPORT.FINAL DX SPEC: NORMAL
PATH REPORT.GROSS SPEC: NORMAL
PATH REPORT.MICROSCOPIC SPEC OTHER STN: NORMAL
PATH REPORT.RELEVANT HX SPEC: NORMAL
PCO2 BLD: 36 MM HG (ref 35–45)
PCO2 BLD: 37 MM HG (ref 35–45)
PCO2 BLD: 38 MM HG (ref 35–45)
PCO2 BLD: 39 MM HG (ref 35–45)
PCO2 BLD: 39 MM HG (ref 35–45)
PCO2 BLDA: 40 MM HG (ref 35–45)
PH BLD: 7.3 [PH] (ref 7.35–7.45)
PH BLD: 7.32 [PH] (ref 7.35–7.45)
PH BLD: 7.34 [PH] (ref 7.35–7.45)
PH BLDA: 7.27 [PH] (ref 7.35–7.45)
PHOSPHATE SERPL-MCNC: 2.5 MG/DL (ref 2.5–4.5)
PHOSPHATE SERPL-MCNC: 2.8 MG/DL (ref 2.5–4.5)
PHOSPHATE SERPL-MCNC: 2.9 MG/DL (ref 2.5–4.5)
PHOSPHATE SERPL-MCNC: 2.9 MG/DL (ref 2.5–4.5)
PHOSPHATE SERPL-MCNC: 3.1 MG/DL (ref 2.5–4.5)
PHOSPHATE SERPL-MCNC: 3.1 MG/DL (ref 2.5–4.5)
PHOTO IMAGE: NORMAL
PLATELET # BLD AUTO: 100 10E3/UL (ref 150–450)
PLATELET # BLD AUTO: 101 10E3/UL (ref 150–450)
PLATELET # BLD AUTO: 113 10E3/UL (ref 150–450)
PLATELET # BLD AUTO: 69 10E3/UL (ref 150–450)
PLATELET # BLD AUTO: 85 10E3/UL (ref 150–450)
PLATELET # BLD AUTO: 89 10E3/UL (ref 150–450)
PO2 BLD: 103 MM HG (ref 80–105)
PO2 BLD: 150 MM HG (ref 80–105)
PO2 BLD: 76 MM HG (ref 80–105)
PO2 BLD: 79 MM HG (ref 80–105)
PO2 BLD: 90 MM HG (ref 80–105)
PO2 BLDA: 62 MM HG (ref 80–105)
POTASSIUM BLD-SCNC: 3.7 MMOL/L (ref 3.5–5)
POTASSIUM SERPL-SCNC: 3.8 MMOL/L (ref 3.4–5.3)
POTASSIUM SERPL-SCNC: 3.9 MMOL/L (ref 3.4–5.3)
POTASSIUM SERPL-SCNC: 4 MMOL/L (ref 3.4–5.3)
POTASSIUM SERPL-SCNC: 4.1 MMOL/L (ref 3.4–5.3)
PROT SERPL-MCNC: 3.9 G/DL (ref 6.4–8.3)
PROT SERPL-MCNC: 4 G/DL (ref 6.4–8.3)
PROT SERPL-MCNC: 4 G/DL (ref 6.4–8.3)
PROT SERPL-MCNC: 4.1 G/DL (ref 6.4–8.3)
PROT SERPL-MCNC: 4.1 G/DL (ref 6.4–8.3)
PROT SERPL-MCNC: 4.2 G/DL (ref 6.4–8.3)
RBC # BLD AUTO: 2.39 10E6/UL (ref 4.4–5.9)
RBC # BLD AUTO: 2.47 10E6/UL (ref 4.4–5.9)
RBC # BLD AUTO: 2.52 10E6/UL (ref 4.4–5.9)
RBC # BLD AUTO: 2.55 10E6/UL (ref 4.4–5.9)
RBC # BLD AUTO: 2.59 10E6/UL (ref 4.4–5.9)
RBC # BLD AUTO: 2.64 10E6/UL (ref 4.4–5.9)
SODIUM BLD-SCNC: 139 MMOL/L (ref 135–145)
SODIUM SERPL-SCNC: 139 MMOL/L (ref 135–145)
SODIUM SERPL-SCNC: 140 MMOL/L (ref 135–145)
TRIGL SERPL-MCNC: 129 MG/DL
UNIT ABO/RH: NORMAL
UNIT ABO/RH: NORMAL
UNIT NUMBER: NORMAL
UNIT NUMBER: NORMAL
UNIT STATUS: NORMAL
UNIT STATUS: NORMAL
UNIT TYPE ISBT: 5100
UNIT TYPE ISBT: 5100
WBC # BLD AUTO: 5.2 10E3/UL (ref 4–11)
WBC # BLD AUTO: 5.5 10E3/UL (ref 4–11)
WBC # BLD AUTO: 5.6 10E3/UL (ref 4–11)
WBC # BLD AUTO: 5.8 10E3/UL (ref 4–11)
WBC # BLD AUTO: 6 10E3/UL (ref 4–11)
WBC # BLD AUTO: 6.2 10E3/UL (ref 4–11)

## 2023-09-29 PROCEDURE — 85610 PROTHROMBIN TIME: CPT | Performed by: PHYSICIAN ASSISTANT

## 2023-09-29 PROCEDURE — 85384 FIBRINOGEN ACTIVITY: CPT | Performed by: PHYSICIAN ASSISTANT

## 2023-09-29 PROCEDURE — 84100 ASSAY OF PHOSPHORUS: CPT | Performed by: PHYSICIAN ASSISTANT

## 2023-09-29 PROCEDURE — 82550 ASSAY OF CK (CPK): CPT | Performed by: INTERNAL MEDICINE

## 2023-09-29 PROCEDURE — 84450 TRANSFERASE (AST) (SGOT): CPT | Performed by: PHYSICIAN ASSISTANT

## 2023-09-29 PROCEDURE — 82805 BLOOD GASES W/O2 SATURATION: CPT | Performed by: PHARMACIST

## 2023-09-29 PROCEDURE — 250N000024 HC ISOFLURANE, PER MIN: Performed by: SURGERY

## 2023-09-29 PROCEDURE — 85027 COMPLETE CBC AUTOMATED: CPT | Performed by: PHYSICIAN ASSISTANT

## 2023-09-29 PROCEDURE — 250N000009 HC RX 250: Performed by: NURSE ANESTHETIST, CERTIFIED REGISTERED

## 2023-09-29 PROCEDURE — 84295 ASSAY OF SERUM SODIUM: CPT

## 2023-09-29 PROCEDURE — 250N000011 HC RX IP 250 OP 636: Mod: JZ

## 2023-09-29 PROCEDURE — 84460 ALANINE AMINO (ALT) (SGPT): CPT | Performed by: PHYSICIAN ASSISTANT

## 2023-09-29 PROCEDURE — 82330 ASSAY OF CALCIUM: CPT | Performed by: PHARMACIST

## 2023-09-29 PROCEDURE — 258N000003 HC RX IP 258 OP 636

## 2023-09-29 PROCEDURE — 250N000011 HC RX IP 250 OP 636: Performed by: SURGERY

## 2023-09-29 PROCEDURE — C9113 INJ PANTOPRAZOLE SODIUM, VIA: HCPCS | Mod: JZ | Performed by: STUDENT IN AN ORGANIZED HEALTH CARE EDUCATION/TRAINING PROGRAM

## 2023-09-29 PROCEDURE — P9016 RBC LEUKOCYTES REDUCED: HCPCS

## 2023-09-29 PROCEDURE — 83735 ASSAY OF MAGNESIUM: CPT | Performed by: PHYSICIAN ASSISTANT

## 2023-09-29 PROCEDURE — 88304 TISSUE EXAM BY PATHOLOGIST: CPT | Mod: 26 | Performed by: PATHOLOGY

## 2023-09-29 PROCEDURE — 360N000076 HC SURGERY LEVEL 3, PER MIN: Performed by: SURGERY

## 2023-09-29 PROCEDURE — P9045 ALBUMIN (HUMAN), 5%, 250 ML: HCPCS | Mod: JZ

## 2023-09-29 PROCEDURE — 250N000009 HC RX 250: Performed by: SURGERY

## 2023-09-29 PROCEDURE — 250N000011 HC RX IP 250 OP 636: Performed by: PHARMACIST

## 2023-09-29 PROCEDURE — 250N000013 HC RX MED GY IP 250 OP 250 PS 637: Performed by: STUDENT IN AN ORGANIZED HEALTH CARE EDUCATION/TRAINING PROGRAM

## 2023-09-29 PROCEDURE — 83605 ASSAY OF LACTIC ACID: CPT | Performed by: PHARMACIST

## 2023-09-29 PROCEDURE — 250N000011 HC RX IP 250 OP 636: Performed by: NURSE ANESTHETIST, CERTIFIED REGISTERED

## 2023-09-29 PROCEDURE — 85730 THROMBOPLASTIN TIME PARTIAL: CPT | Performed by: PHYSICIAN ASSISTANT

## 2023-09-29 PROCEDURE — 999N000157 HC STATISTIC RCP TIME EA 10 MIN

## 2023-09-29 PROCEDURE — 84478 ASSAY OF TRIGLYCERIDES: CPT | Performed by: SURGERY

## 2023-09-29 PROCEDURE — 99222 1ST HOSP IP/OBS MODERATE 55: CPT | Mod: FS | Performed by: CLINICAL NURSE SPECIALIST

## 2023-09-29 PROCEDURE — 99291 CRITICAL CARE FIRST HOUR: CPT | Mod: GC | Performed by: SURGERY

## 2023-09-29 PROCEDURE — 71045 X-RAY EXAM CHEST 1 VIEW: CPT

## 2023-09-29 PROCEDURE — 200N000002 HC R&B ICU UMMC

## 2023-09-29 PROCEDURE — 272N000001 HC OR GENERAL SUPPLY STERILE: Performed by: SURGERY

## 2023-09-29 PROCEDURE — 82803 BLOOD GASES ANY COMBINATION: CPT

## 2023-09-29 PROCEDURE — 94003 VENT MGMT INPAT SUBQ DAY: CPT

## 2023-09-29 PROCEDURE — 250N000011 HC RX IP 250 OP 636: Mod: JZ | Performed by: STUDENT IN AN ORGANIZED HEALTH CARE EDUCATION/TRAINING PROGRAM

## 2023-09-29 PROCEDURE — 71045 X-RAY EXAM CHEST 1 VIEW: CPT | Mod: 26 | Performed by: STUDENT IN AN ORGANIZED HEALTH CARE EDUCATION/TRAINING PROGRAM

## 2023-09-29 PROCEDURE — 370N000017 HC ANESTHESIA TECHNICAL FEE, PER MIN: Performed by: SURGERY

## 2023-09-29 RX ORDER — CALCIUM CHLORIDE 100 MG/ML
INJECTION INTRAVENOUS; INTRAVENTRICULAR PRN
Status: DISCONTINUED | OUTPATIENT
Start: 2023-09-29 | End: 2023-09-29

## 2023-09-29 RX ORDER — DEXTROSE MONOHYDRATE 100 MG/ML
INJECTION, SOLUTION INTRAVENOUS CONTINUOUS PRN
Status: DISCONTINUED | OUTPATIENT
Start: 2023-09-29 | End: 2023-10-16

## 2023-09-29 RX ORDER — FENTANYL CITRATE 50 UG/ML
INJECTION, SOLUTION INTRAMUSCULAR; INTRAVENOUS PRN
Status: DISCONTINUED | OUTPATIENT
Start: 2023-09-29 | End: 2023-09-29

## 2023-09-29 RX ORDER — PROPOFOL 10 MG/ML
INJECTION, EMULSION INTRAVENOUS CONTINUOUS PRN
Status: DISCONTINUED | OUTPATIENT
Start: 2023-09-29 | End: 2023-09-29

## 2023-09-29 RX ORDER — CEFAZOLIN SODIUM 1 G/3ML
INJECTION, POWDER, FOR SOLUTION INTRAMUSCULAR; INTRAVENOUS PRN
Status: DISCONTINUED | OUTPATIENT
Start: 2023-09-29 | End: 2023-09-29

## 2023-09-29 RX ORDER — FUROSEMIDE 10 MG/ML
120 INJECTION INTRAMUSCULAR; INTRAVENOUS ONCE
Status: COMPLETED | OUTPATIENT
Start: 2023-09-29 | End: 2023-09-29

## 2023-09-29 RX ORDER — GUAIFENESIN 600 MG/1
15 TABLET, EXTENDED RELEASE ORAL DAILY
Status: DISCONTINUED | OUTPATIENT
Start: 2023-09-29 | End: 2023-09-29

## 2023-09-29 RX ORDER — B COMPLEX C NO.10/FOLIC ACID 900MCG/5ML
10 LIQUID (ML) ORAL DAILY
Status: DISCONTINUED | OUTPATIENT
Start: 2023-09-30 | End: 2023-10-11

## 2023-09-29 RX ORDER — SODIUM CHLORIDE, SODIUM GLUCONATE, SODIUM ACETATE, POTASSIUM CHLORIDE AND MAGNESIUM CHLORIDE 526; 502; 368; 37; 30 MG/100ML; MG/100ML; MG/100ML; MG/100ML; MG/100ML
INJECTION, SOLUTION INTRAVENOUS CONTINUOUS PRN
Status: DISCONTINUED | OUTPATIENT
Start: 2023-09-29 | End: 2023-09-29

## 2023-09-29 RX ADMIN — SUGAMMADEX 200 MG: 100 INJECTION, SOLUTION INTRAVENOUS at 09:56

## 2023-09-29 RX ADMIN — Medication 20 MG: at 08:39

## 2023-09-29 RX ADMIN — CHLORHEXIDINE GLUCONATE 0.12% ORAL RINSE 15 ML: 1.2 LIQUID ORAL at 19:42

## 2023-09-29 RX ADMIN — METRONIDAZOLE 500 MG: 5 INJECTION, SOLUTION INTRAVENOUS at 08:09

## 2023-09-29 RX ADMIN — Medication 30 MG: at 07:57

## 2023-09-29 RX ADMIN — CALCIUM CHLORIDE INJECTION 200 MG: 100 INJECTION, SOLUTION INTRAVENOUS at 09:46

## 2023-09-29 RX ADMIN — FENTANYL CITRATE 50 MCG: 50 INJECTION, SOLUTION INTRAMUSCULAR; INTRAVENOUS at 08:25

## 2023-09-29 RX ADMIN — METRONIDAZOLE 500 MG: 5 INJECTION, SOLUTION INTRAVENOUS at 19:39

## 2023-09-29 RX ADMIN — CALCIUM CHLORIDE INJECTION 200 MG: 100 INJECTION, SOLUTION INTRAVENOUS at 09:07

## 2023-09-29 RX ADMIN — CALCIUM CHLORIDE INJECTION 200 MG: 100 INJECTION, SOLUTION INTRAVENOUS at 09:10

## 2023-09-29 RX ADMIN — CALCIUM CHLORIDE INJECTION 200 MG: 100 INJECTION, SOLUTION INTRAVENOUS at 09:17

## 2023-09-29 RX ADMIN — SODIUM CHLORIDE, POTASSIUM CHLORIDE, SODIUM LACTATE AND CALCIUM CHLORIDE: 600; 310; 30; 20 INJECTION, SOLUTION INTRAVENOUS at 01:05

## 2023-09-29 RX ADMIN — SODIUM CHLORIDE, SODIUM GLUCONATE, SODIUM ACETATE, POTASSIUM CHLORIDE AND MAGNESIUM CHLORIDE: 526; 502; 368; 37; 30 INJECTION, SOLUTION INTRAVENOUS at 07:32

## 2023-09-29 RX ADMIN — Medication 50 MG: at 07:32

## 2023-09-29 RX ADMIN — PROPOFOL 25 MCG/KG/MIN: 10 INJECTION, EMULSION INTRAVENOUS at 10:33

## 2023-09-29 RX ADMIN — PROPOFOL 20 MCG/KG/MIN: 10 INJECTION, EMULSION INTRAVENOUS at 14:56

## 2023-09-29 RX ADMIN — MIDAZOLAM 2 MG: 1 INJECTION INTRAMUSCULAR; INTRAVENOUS at 07:32

## 2023-09-29 RX ADMIN — CEFEPIME HYDROCHLORIDE 2 G: 2 INJECTION, POWDER, FOR SOLUTION INTRAVENOUS at 21:48

## 2023-09-29 RX ADMIN — CEFAZOLIN 2 G: 1 INJECTION, POWDER, FOR SOLUTION INTRAMUSCULAR; INTRAVENOUS at 08:00

## 2023-09-29 RX ADMIN — ALBUMIN HUMAN 25 G: 0.05 INJECTION, SOLUTION INTRAVENOUS at 16:58

## 2023-09-29 RX ADMIN — CALCIUM GLUCONATE: 98 INJECTION, SOLUTION INTRAVENOUS at 19:38

## 2023-09-29 RX ADMIN — FUROSEMIDE 120 MG: 10 INJECTION, SOLUTION INTRAVENOUS at 14:13

## 2023-09-29 RX ADMIN — Medication 125 MCG/HR: at 18:23

## 2023-09-29 RX ADMIN — PANTOPRAZOLE SODIUM 40 MG: 40 INJECTION, POWDER, FOR SOLUTION INTRAVENOUS at 10:29

## 2023-09-29 RX ADMIN — ALBUMIN HUMAN 25 G: 0.05 INJECTION, SOLUTION INTRAVENOUS at 11:44

## 2023-09-29 RX ADMIN — FENTANYL CITRATE 50 MCG: 50 INJECTION, SOLUTION INTRAMUSCULAR; INTRAVENOUS at 08:28

## 2023-09-29 RX ADMIN — CALCIUM CHLORIDE INJECTION 200 MG: 100 INJECTION, SOLUTION INTRAVENOUS at 09:21

## 2023-09-29 ASSESSMENT — ACTIVITIES OF DAILY LIVING (ADL)
ADLS_ACUITY_SCORE: 43

## 2023-09-29 NOTE — ANESTHESIA POSTPROCEDURE EVALUATION
Patient: Alfredo Burnham    Procedure: Procedure(s):  exploration of  ABDOMINAL CAVITY, placement of abthera  exploration of left lower extremity, partial closure of left lower extremity, wound vac placement       Anesthesia Type:  No value filed.    Note:  Disposition: Inpatient   Postop Pain Control: Uneventful            Sign Out: Well controlled pain   PONV:    Neuro/Psych: Uneventful            Sign Out: Acceptable/Baseline neuro status   Airway/Respiratory: Uneventful            Sign Out: AIRWAY IN SITU/Resp. Support   CV/Hemodynamics: Uneventful            Sign Out: Acceptable CV status; No obvious hypovolemia; No obvious fluid overload   Other NRE: NONE   DID A NON-ROUTINE EVENT OCCUR? No    Event details/Postop Comments:  No complications.           Last vitals:  Vitals:    09/29/23 0700 09/29/23 0715 09/29/23 0730   BP:      Pulse: 86 85 90   Resp: 16 16 16   Temp: 37.5  C (99.5  F) 37.4  C (99.3  F) 37.7  C (99.9  F)   SpO2: 100% 100% 99%       Electronically Signed By: Mason Curran MD  September 29, 2023  9:53 AM

## 2023-09-29 NOTE — OP NOTE
VASCULAR SURGERY OPERATIVE REPORT      LOCATION:     [facility]     Alfredo Burnham  Medical Record #:  [id number]  YOB: 1955  Age:  68 year old         Date of Service: 9/29/2023     Preoperative diagnosis     Status post repair of Ruptured pararenal Abdominal Aortic Aneurysm     Postoperative diagnosis     Status post repair of Ruptured pararenal Abdominal Aortic Aneurysm     Surgeon: Boris Lowe MD  First Assistant: Ada Donald MD (Pgy3 vascular surgery resident)  A first assistant actively participated and was necessary for one or more of the following: opening, exposure and visualization during the case, maintaining hemostasis, wound closure resulting in its safe and expeditious completion.         Procedures:  Abdominal exploration and washout  Evaluation bowel  Temporary abdominal closure  Partial closure left medial fasciotomy     Findings:   -Perfused small bowel with improving appearance  - No full thickness ischemia of colon  - Ischemic but non necrotic lower extremity musculature     Indication for procedure:     Mr. Burnham is a 68 year old year old male who presented to his local emergency department with a ruptured pararenal aortic aneurysm on 9/25/2023 and underwent repair after near midnight on 9/24/2023.  He returns for another planned staged return for evaluation of abdominal contents and lower extremity wounds.  Consent previously obtained from family.        Description of procedure:     Operative details:     The patient was brought to the hybrid operating room.  Under satisfactory general anesthesia, he was placed in supine position with all pressure points padded in standard fashion.  The abdomen and left lower leg were widely prepped and draped in sterile, standard fashion.  A surgical pause was performed with all members of the surgical team to verify patient, medical record number, birth date, planned surgical  procedure, surgical site, allergies, fire risk and perioperative antibiotics.     The abthera was removed and abdomen explored.  The small bowel was run from ligament of Treitz to ileocecal valve and was pink without any areas of necrosis.  The areas of hemorrhage in the sigmoid colon wall were much improved.  The entirety of the transverse colon, sigmoid colon, and rectum were pink. The slightly thin area in the distal transverse colon seen previously appeared resolved.  Dr. Shirley also evaluated the bowel and felt that we would be safe to close. I examined the retroperitoneum closure which was intact without hematoma. I did evaluate for closure, however, he has now developed significant bowel edema which prohibits closure.  The abdomen was irrigated with 1 L of warm solution.  The abthera was replaced after counts were verified to be correct.  Attention was turned to the leg.  The muscle was ischemic but viable with no necrosis.  The leg was irrigated and no excisional debridement performed.  We decided to partially close more of the the medial incision.  Closing counts were correct.  After continued partial skin closure, a single black sponge was placed in the wound and sterile VAC tape applied.  The VAC was applied to suction.     Wound dimensions: Medial incision: 15 cm long by 8 cm wide with closure of additional 5 cm distal and 1 cm proximal cm of distal portion and 4cm of proximal portion. 2-0 PDS deep dermals and 3-0 nylon vertical mattress skin closure  Lateral incision: previously closed.     He was transferred to the ICU in critical condition unchanged from previous.  The family was updated.     Estimated blood loss: 10 ml      Specimens: none      Complications: none apparent     Plan:  -Continue ICU cares  -Allow him to equilibrate post OR, but will continue to discuss potential for diuretic challenge once he stabilizes out.  We will have to closely monitor for need to replace fluid to avoid  intravascular depletion  -Likely start trickle feeds today             Boris Lowe MD, RPVI  VASCULAR AND ENDOVASCULAR SURGERY   [facility] Vascular Surgery

## 2023-09-29 NOTE — PLAN OF CARE
Neuro: Opens eyes during care, sedated, minimal contraction noted on BUE, pupils brisk and equal.  CV: sinus rhythm 80-90, off levo since 2300H, map goal >65. Tmax: 100, no pedal and popliteal pulses on LLE, cold to touch. CVP 13-17  Resp: CMV,40%/450/16/8, LS clear to coarse bilaterally, minimal secretions  GI/: TPN, smear loose BM, aparicio in place, minimal UO, 10-15 q2hrs  Skin: Open abdomen with abthera and vac ulta, generalized bruising, left leg wrapped with ACE bandage  Plan: RTOR this AM for possible closure of abdomen and wound vac placement on LLE.   For vital signs and complete assessments, please see documentation flowsheets.

## 2023-09-29 NOTE — PROGRESS NOTES
CLINICAL NUTRITION SERVICES - BRIEF NOTE      Reason for RD note: Initiate trophic EN via NGT    New Findings/Chart Review:  RTOR this am for exploration of abdominal cavity - per OP report, perfused small bowel with improving appearance; placement of abthera, wound vac LLE    Nutrition support:  TPN day 3. Dextrose to goal today: 240g dextrose, 120g AA.  Lipids held with propofol  OK for trophic EN per surgery via NGT    Renal: MAYA, nephrology following    Labs and meds reviewed    Interventions:  - EN orders placed: Pivot 1.5 @ 10 ml/hr  - FWF 30 ml q 4 hours  - HOB 30 degrees with gastric feeds  - MVI with minerals.  Discussed with pharmD, can hold infuvite / MTE in PN with addition of enteral MVI    Future/Additional Recommendations:  - Advance EN as able, goal: Pivot 1.5 Juvencio (or equivalent) @ goal of  55ml/hr  (1320ml/day) provides: 1980 kcals, 123 g PRO, 990 ml free H20, 227 g CHO, and 9 g fiber daily.   - OK to discontinue PN when EN reaches 35 ml/hr    Nutrition will continue to follow per protocol.    Natty Zeng, RD, LD, CNSC  [address]E SICU RD pager: [phone]  Ascom: [phone]  Weekend/Holiday RD pager [phone]

## 2023-09-29 NOTE — CONSULTS
Nephrology Initial Consult  September 29, 2023      Alfredo Burnham MRN:[id number] YOB: 1955  Date of Admission:9/24/2023  Primary care provider: [facility]  Requesting physician: Boris Lowe    ASSESSMENT AND RECOMMENDATIONS:   MAYA-Baseline Cr 1.0, ordered UA.  CT showed benign cyst but otherwise normal kidneys.  Cr on the rise since surgery, did receive contrast for CTA.  Urine microscopy showed granular casts suggesting ATN which will recover with time and stabilizing hemodynamics.  Until then we will watch closely for need for RRT based on chemistries and volume status.  We discussed indications and plan for RRT with family should a need arise, consent done should a need arise acutely.     -No indication for RRT acutely but high risk of needing if improving BP's do not result in signs of recovery in next day or two.      -Can give dose of lasix to see if there is any return of tubular function.     -Dialysis consent signed and scanned into media tab.     Volume-Total body volume overloaded but much of it is likely 3rd spaced with low albumin and acute illness.  Following CVP's which are on the rise but continuing with volume expansion today, vent settings reasonable, newly off of pressors today.  Can give dose of lasix as a challenge to see response.     Electrolytes-K 4.0, bicarb 18, Na 140.  If further volume expanding we can consider giving isotonic bicarb.  ABG reasonable at 7.30/39/79/20.      BMD-Ca 8.0, Mg and Phos WNL.      Anemia-Hgb 8.6, ~14 on presentation, acute management per team.      Nutrition-On TPN started 9/27    Time spent: 40 minutes on this date of encounter for chart review, physical exam, medical decision making and co-ordination of care.     Seen and discussed with Dr Ryder    Recommendations were communicated to primary team via verbal communication.       Bebeto Sesay, APRN CNS  Clinical Nurse Specialist  [phone]    REASON FOR  CONSULT: Requested to evaluate 68 yom for management of MAYA post AAA surgical repair.      HISTORY OF PRESENT ILLNESS:  Alfredo Burnham is a 68 yom with complex hx of TIA, HTN, blindness who presented to [facility] 9/24 with severe abdominal pain radiating to flank and back, CT revealed AAA with a contained rupture.  Taken to OR for aortobiiliac bypass and resection of ruptured pararenal aneurysm.   Post op course complicated by hypotension requiring pressors and metabolic acidosis.  Baseline Cr 1.0 on presentation but on the rise to >5 at time of renal consult for MAYA management and possible RRT.      PAST MEDICAL HISTORY  Past Medical History:   Diagnosis Date    Hypertension 2/8/2011    Macular degeneration        Past Surgical History:   Procedure Laterality Date    INCISION AND DRAINAGE ABDOMEN WASHOUT, COMBINED N/A 9/25/2023    Procedure: abdominal washout, combined, repacking,  abthera placement;  Surgeon: Ash Boucher MBBS;  Location: [facility] OR    INCISION AND DRAINAGE ABDOMEN WASHOUT, COMBINED N/A 9/26/2023    Procedure: Abdominal washout, Retroperitoneal closure, washout left lower extremity wound;  Surgeon: Boris Lowe;  Location: [facility] OR    IR OR ANGIOGRAM  9/25/2023    LAPAROTOMY EXPLORATORY N/A 9/25/2023    Procedure: Laparotomy exploratory;  Surgeon: Roger Shirley MD;  Location: [facility] OR    REPAIR ANEURYSM ABDOMINAL AORTA N/A 9/24/2023    Procedure: Resection of Ruptureed Abdominal Aneurysm, Aortic biliary bypass with 20 x 10 mm Bifurcated hemagard graft, Temporary Abdominal Closure, Endovascular Balloon Inclusion of Aorta;  Surgeon: Boris Lowe;  Location: [facility] OR    SIGMOIDOSCOPY FLEXIBLE N/A 9/25/2023    Procedure: Sigmoidoscopy flexible;  Surgeon: Gabino Walker MD;  Location: [facility] GI    THROMBECTOMY LOWER EXTREMITY Left 9/25/2023    Procedure: SFA, popliteal, and tibial thromboembolectomy and four compartment fasciotomy;  Surgeon: Ash Boucher MBBS;  Location: [facility] OR         MEDICATIONS:  PTA Meds  Prior to Admission medications    Medication Sig Last Dose Taking? Auth Provider Long Term End Date   amLODIPine-benazepril (LOTREL) 5-20 MG capsule Take 1 capsule by mouth daily Past Week Yes Reported, Patient Yes       Current Meds   ceFEPIme  2 g Intravenous Q24H    chlorhexidine  15 mL Mouth/Throat Q12H    metroNIDAZOLE  500 mg Intravenous Q12H    pantoprazole  40 mg Per Feeding Tube QAM AC    Or    pantoprazole  40 mg Intravenous QAM AC    [Held by provider] polyethylene glycol  17 g Oral or Feeding Tube Daily    [Held by provider] senna-docusate  1 tablet Oral or Feeding Tube BID    sodium chloride (PF)  3 mL Intracatheter Q8H     Infusion Meds   dexmedeTOMIDine      dextrose      dextrose      fentaNYL 125 mcg/hr (09/29/23 0400)    insulin regular Stopped (09/25/23 2100)    norepinephrine Stopped (09/28/23 2300)    parenteral nutrition - ADULT compounded formula      parenteral nutrition - ADULT compounded formula 45 mL/hr at 09/29/23 0000    propofol 25 mcg/kg/min (09/29/23 1033)    vasopressin Stopped (09/29/23 0828)       ALLERGIES:    Allergies   Allergen Reactions    Penicillins Swelling     Facial swelling       REVIEW OF SYSTEMS:  A 10 point review of systems was negative except as noted above.    SOCIAL HISTORY:   Social History     Socioeconomic History    Marital status:      Spouse name: Not on file    Number of children: Not on file    Years of education: Not on file    Highest education level: Not on file   Occupational History    Not on file   Tobacco Use    Smoking status: Every Day     Packs/day: 0.50     Types: Cigarettes    Smokeless tobacco: Not on file   Substance and Sexual Activity    Alcohol use: Yes     Comment: 1-2/wk    Drug use: No    Sexual activity: Yes     Partners: Female   Other Topics Concern    Parent/sibling w/ CABG, MI or angioplasty before 65F 55M? Yes   Social History Narrative    Not on file     Social Determinants of Health  "    Financial Resource Strain: Not on file   Food Insecurity: Not on file   Transportation Needs: Not on file   Physical Activity: Not on file   Stress: Not on file   Social Connections: Not on file   Interpersonal Safety: Not on file   Housing Stability: Not on file     Reviewed with patient's family, noncontributory.      FAMILY MEDICAL HISTORY:   Family History   Problem Relation Age of Onset    Unknown/Adopted Mother     Heart Disease Mother     Arthritis Mother     Hypertension Brother     Blood Disease Sister     Psychotic Disorder Sister      Reviewed with patient's family, noncontributory family hx.     PHYSICAL EXAM:   Temp  Av.4  F (37.4  C)  Min: 95  F (35  C)  Max: 100.9  F (38.3  C)  Arterial Line BP  Min: 56/46  Max: 229/184  Arterial Line MAP (mmHg)  Av.2 mmHg  Min: 43 mmHg  Max: 271 mmHg      Pulse  Av.7  Min: 71  Max: 118 Resp  Av.9  Min: 0  Max: 26  FiO2 (%)  Av.7 %  Min: 40 %  Max: 70 %  SpO2  Av.1 %  Min: 88 %  Max: 100 %    CVP (mmHg): 17 mmHg  /60   Pulse 91   Temp 98.6  F (37  C)   Resp 16   Ht 1.727 m (5' 8\")   Wt 95.4 kg (210 lb 5.1 oz)   SpO2 95%   BMI 31.98 kg/m     Date 23 0700 - 23 0659   Shift 7303-5985 1020-5279 4008-5696 24 Hour Total   INTAKE   I.V. 606   606   Shift Total(mL/kg) 606(6.35)   606(6.35)   OUTPUT   Urine 6   6   Emesis/NG output 180   180   Drains 300   300   Shift Total(mL/kg) 486(5.09)   486(5.09)   Weight (kg) 95.4 95.4 95.4 95.4      Admit Weight: 71.7 kg (158 lb)     GENERAL APPEARANCE: Intubated and sedated.    EYES: No scleral icterus  Pulmonary: On vent 40%/8 of PEEP  CV: Regular rhythm, normal rate   - Edema +3 generalized.    GI: distended, nontender  MS: no evidence of inflammation in joints, no muscle tenderness  : + Lu  SKIN: no rash, warm, dry  NEURO: Intubated and sedated.      LABS:   CMP  Recent Labs   Lab 23  1050 23  0901 23  0351 23  0349 23  0010 23  0009 " 09/28/23 2059    139 140  --  139  --  140   POTASSIUM 4.0 3.7 4.0  --  4.1  --  4.2   CHLORIDE 110*  --  109*  --  110*  --  109*   CO2 18*  --  17*  --  18*  --  17*   ANIONGAP 12  --  14  --  11  --  14   * 120* 128* 130* 126*   < > 119*   BUN 58.3*  --  50.3*  --  48.8*  --  44.1*   CR 5.67*  --  5.49*  --  5.16*  --  5.27*   GFRESTIMATED 10*  --  10*  --  11*  --  11*   OMAR 8.0*  --  7.5*  --  7.4*  --  7.3*   MAG 2.0  --  2.1  --  2.0  --  2.1   PHOS 3.1  --  2.9  --  2.9  --  3.0   PROTTOTAL 3.9*  --  4.1*  --  4.2*  --  4.2*   ALBUMIN 2.1*  --  2.3*  --  2.5*  --  2.4*   BILITOTAL 1.3*  --  1.6*  --  1.6*  --  1.5*   ALKPHOS 92  --  77  --  92  --  67   *  --  267*  --  285*  --  348*   ALT 69  --  85*  --  92*  --  111*    < > = values in this interval not displayed.     CBC  Recent Labs   Lab 09/29/23  1050 09/29/23  0901 09/29/23  0351 09/29/23  0010 09/28/23 2059   HGB 8.5* 9.0* 8.2* 8.1* 7.5*   WBC 6.2  --  5.6 5.2 4.7   RBC 2.59*  --  2.52* 2.47* 2.29*   HCT 26.1*  --  25.5* 25.1* 23.5*   *  --  101* 102* 103*   MCH 32.8  --  32.5 32.8 32.8   MCHC 32.6  --  32.2 32.3 31.9   RDW 18.1*  --  18.0* 17.8* 18.1*   *  --  100* 113* 94*     INR  Recent Labs   Lab 09/29/23  1050 09/29/23  0351 09/29/23  0010 09/28/23 2059   INR 1.33* 1.33* 1.40* 1.45*   PTT 35 36 33 38     ABG  Recent Labs   Lab 09/29/23  1051 09/29/23  0901 09/29/23  0351 09/29/23  0010   PH 7.32* 7.27* 7.32* 7.32*   PCO2 39 40 38 37   PO2 103 62* 76* 90   HCO3 20* 18* 19* 19*   O2PER 40 61.0 40 40      URINE STUDIES  No lab results found.  No lab results found.  PTH  No lab results found.  IRON STUDIES  No lab results found.    I, Annmarie Ryder, saw and evaluated this patient as part of a shared visit.  I have reviewed and discussed with the advanced practice provider their history, physical and plan.  I have personally performed the substantive portion of the medical decision making for this  visit - please see the SANDY's documentation for the full details  I personally reviewed the vital signs, medications, labs and imaging.    Key findings and management decisions made by me:  MAYA in setting of AAA contained rupture s/p bypass and aneurysm resection, complicated by hypotension. Has decreasing urine output, back to OR for exploration, LLE ischemia. Will give diuretic challenge, but concern that he will need CRRT in next 24 hours unless UOP improves. Lasix given this afternoon, will followup. Electrolytes stable thus far  Will check CK.  Low albumin noted.    Annmarie Ryder  Date of Service (when I saw the patient): 9/29

## 2023-09-29 NOTE — PROGRESS NOTES
SURGICAL ICU PROGRESS NOTE  09/29/2023        Date of Service (when I saw the patient): 09/29/2023    ASSESSMENT:  ASSESSMENT:  Alfredo Burnham is a 68 year old male who was admitted to the SICU on 9/24/2023 s/p open AAA repair. Patient has a history of HTN and previous TIA. He presented to the ED earlier today with right sided abdominal pain and was found to have a contained, ruptured AAA on CT. 30 min super-celiac clamp time. Prolonged intra- renal clamp. Now s/p flex sig showing rectal ischemia, abdominal washout showing a hemostatic AAA graft and no evidence of transmural colonic or small bowel ischemia, and an unsuccessful LLE embolectomy 9/25. Also now s/p repeat abdominal washout, retroperitoneal closure, LLE wound washout/closure 9/26. s/p repeat abd washout, bowel appeared well perfused. Patient remains intubated with an open abdomen, receiving MTP.       CHANGES and MAJOR THINGS TODAY:   -Start trickle tube feeds   -Wean sedation post op  -Nephrology consult  -Hold maintenance fluids   -Will give another unit of albumin  -Flowtrac for volume status monitoring     PLAN:    Neurological:  # Acute pain   # Agitation   # Encephalopathy   - Monitor neurological status. Delirium preventions and precautions  - Sedation plan: propofol infusion @ 15   - Pain: fentanyl 125  - Plan to wean sedation as able post-op    Pulmonary:  # Post operative ventilatory support  # Acute hypoxic respiratory support   - VENT: AC-VC 40% FiO2, tidal volume 450, RR of 16, PEEP 8. Continue full vent support. PST when meets criteria.  Ventilatory bundle. HOB elevation   - Satting 100% overnight  - Supplemental oxygen to keep saturation above 92%.  - Bronch 9/28 for L opacification of the isidro-field. Follow up CXR demonstrates significant improvement. Will continue to follow culture results    Cardiovascular:    # Shock-hypovolemic  # H/o HTN  # Contained AAA rupture s/p open repair  # Suspected emboli to LLE  - LLE wrapped in  bear hugger with sheepskin boot in place  - Monitor hemodynamic status.   - Echo 9/28 showed EF 60-65% and collapsible IVC  - Goal MAP >65, ideally -140  - Wean pressors as able  - Vasopressin to 2.4, off norepi overnight. Will stop vaso and titrate norepi  - Flowtrac for volume status monitoring     GI/Nutrition:    # Open abdomen  # Protein calorie deficit malnutrition, risk of  # Post-operative melena  # Rectal ischemia  - NPO  - PPI for ppx  - Zosyn for ppx of bacterial translocation  Appreciate GI recs 9/25:  -Continue NPO status   -Supportive cares for GI bleed:  -- Ensure two large bore IVs  -- Adequate volume resuscitation with IVF, pRBC  -- Maintain up to date type and screen.  -- Monitor Hgb closely. Transfuse for Hgb<7 (improved outcomes with restricted vs liberal threshold)  -- Monitor stool output.  Document color and quality.  -- Avoid NSAIDs  - TPN  - OR with vascular surgery today (9/29) for abd washout   - Start trickle feeds today if colon does not appear ischemic    Fluids/Electrolytes:  - LR/albumin boluses for IV fluid hydration/resuscitation as tolerated  - mIVF 100 ml/hr LR discontinued  - Replace calcium as needed  - Will give another unit of albumin    Renal:   # Acute kidney injury  # Oliguria  #hyperkalemia resolved  - Concern for ATN  - No indication for CRRT at this time  - Urine output  ~5 mL/hr overnight. Will continue to fluid resuscitate and monitor intake and output.  - Nephrology consult, lasix challenge with 120 mg lasix    Endocrine:  # Stress hyperglycemia, risk of  - Insulin as needed; goal to keep BG< 180 for optimal wound healing     Infectious disease:   # filemon-operative prophylaxis   # bacterial translocation in setting of possibly ischemic bowel  - Cefepime/Flagyl for ppx for bacterial translocation    Cultures:  - No growth from bronch culture (9/28)    Hematology:    # Acute blood loss anemia  # Active resuscitation  - Goal hgb >8, fibrinogen >200, INR <1.5, plts  >100   - Will continue active, balanced resuscitation.   - Trend coags and CBC q2hr in initial resuscitation period   - Received 1 U RBCs and 2 U platelets overnight      Musculoskeletal:  # Weakness and deconditioning of critical illness, risk of  # Left lower limb ischemia   - Physical and occupational therapy consult when appropriate. Passive ROM at bedside with RN  - Flat bedrest, do not break bed    General Cares/Prophylaxis:    DVT Prophylaxis: Pneumatic Compression Devices  GI Prophylaxis: PPI  Restraints: Restraints for medical healing needed: YES    Lines/ tubes/ drains:  - ETT  - Lu  - Abthera  - PIV x3  - R IJ  - R radial arterial line      Disposition:  Surgical ICU     Patient seen, findings and plan discussed with surgical ICU staff, Dr. Escobedo.      Jenni Mora, MS4  Surgical ICU    --------------------------------------------------------------------------------------------------------------------      I saw and evaluated the patient in an independent visit and agree with the student's assessment and plan as written above. I was present at all times for any mentioned procedures.      Lucas Niño MD    ====================================  INTERVAL HISTORY:   Stable overnight, on 2.4 of vasopressin for pressure support, no norepi. No fluid boluses overnight. Received 2u of platelets and 1 unit of packed RBCs.     ROS unable to be performed due to sedation and intubation.      OBJECTIVE:   1. VITAL SIGNS:   Temp:  [99.9  F (37.7  C)-100.6  F (38.1  C)] 100  F (37.8  C)  Pulse:  [83-96] 90  Resp:  [15-20] 18  MAP:  [58 mmHg-87 mmHg] 86 mmHg  Arterial Line BP: ()/(48-72) 110/68  FiO2 (%):  [40 %-70 %] 50 %  SpO2:  [89 %-100 %] 100 %  Vent Mode: CMV/AC  (Continuous Mandatory Ventilation/ Assist Control)  FiO2 (%): 50 %  Resp Rate (Set): 16 breaths/min  Tidal Volume (Set, mL): 450 mL  PEEP (cm H2O): 8 cmH2O  Resp: 18      2. INTAKE/ OUTPUT:   I/O last 3 completed shifts:  In: 6178.5  [I.V.:3676.5]  Out: 3553 [Urine:203; Emesis/NG output:900; Drains:2450]    3. PHYSICAL EXAMINATION:  General: Intubated, sedated  HEENT: Normocephalic, atraumatic. NGT to LIS, ETT  Neuro: Sedated  Pulm/Resp: Intubated  CV: RRR  Abdomen: Bowel visible in right lower portion of wound. Open abdomen with Abthera in place, serosanguinous output, soft, moderately distended  :  aparicio catheter in place, urine yellow and clear  MSK/Extremities: LLE popliteal with doppler signal, no palpable pulse. L lower leg and foot mottled, cold. Right palpable pedal signal, R pinky toe dusky.     4. INVESTIGATIONS:   Arterial Blood Gases   Recent Labs   Lab 09/29/23  0351 09/29/23  0010 09/28/23 2105 09/28/23  1552   PH 7.32* 7.32* 7.31* 7.31*   PCO2 38 37 39 38   PO2 76* 90 89 102   HCO3 19* 19* 19* 19*     Complete Blood Count   Recent Labs   Lab 09/29/23  0351 09/29/23  0010 09/28/23 2059 09/28/23  1556   WBC 5.6 5.2 4.7 5.7   HGB 8.2* 8.1* 7.5* 7.9*   * 113* 94* 66*     Basic Metabolic Panel  Recent Labs   Lab 09/29/23  0351 09/29/23  0349 09/29/23  0010 09/29/23  0009 09/28/23 2059 09/28/23  2045 09/28/23  1556     --  139  --  140  --  140   POTASSIUM 4.0  --  4.1  --  4.2  --  4.4   CHLORIDE 109*  --  110*  --  109*  --  111*   CO2 17*  --  18*  --  17*  --  17*   BUN 50.3*  --  48.8*  --  44.1*  --  42.2*   CR 5.49*  --  5.16*  --  5.27*  --  5.09*   * 130* 126* 121* 119*   < > 118*    < > = values in this interval not displayed.     Liver Function Tests  Recent Labs   Lab 09/29/23  0351 09/29/23  0010 09/28/23 2059 09/28/23  1556   * 285* 348* 443*   ALT 85* 92* 111* 150*   ALKPHOS 77 92 67 67   BILITOTAL 1.6* 1.6* 1.5* 1.3*   ALBUMIN 2.3* 2.5* 2.4* 1.9*   INR 1.33* 1.40* 1.45* 1.59*     Pancreatic Enzymes  Recent Labs   Lab 09/24/23  1644   LIPASE 21     Coagulation Profile  Recent Labs   Lab 09/29/23  0351 09/29/23  0010 09/28/23 2059 09/28/23  1556   INR 1.33* 1.40* 1.45* 1.59*   PTT 36 33 38  40*         5. RADIOLOGY:   Recent Results (from the past 24 hour(s))   Echo Limited   Result Value    LVEF  60-65%    Narrative    [id number]  [id number]  [id number]  [id number]^TYSHAWN^ROLAND     [facility],[place]  Echocardiography Laboratory  [address] [place] [address]     Name: NEWTON BUCKLEY  MRN: [id number]  : 1955  Study Date: 2023 09:09 AM  Age: 68 yrs  Gender: Male  [facility]  Reason For Study: Aortic Aneurysm  Ordering Physician: ROLAND VILLAGRAN  Performed By: Jaky Gregory RDCS     BSA: 2.1 m2  Height: 68 in  Weight: 210 lb  HR: 86  BP: 116/73 mmHg  ______________________________________________________________________________  Procedure  Limited Portable Echo Adult. Contrast Optison. Technically difficult study.  Optison (NDC #5403-7323-03) given intravenously. Patient was given 5 ml  mixture of 3 ml Optison and 6 ml saline. 4 ml wasted.  ______________________________________________________________________________  Interpretation Summary  Global and regional left ventricular function is normal with an EF of 60-65%.  Global right ventricular function is normal.  Cannot assess aorta diameter due to poor image quality.  ______________________________________________________________________________  Left Ventricle  Global and regional left ventricular function is normal with an EF of 60-65%.     Right Ventricle  The right ventricle is normal size. Global right ventricular function is  normal.     Tricuspid Valve  The valve leaflets are not well visualized. Trace tricuspid insufficiency is  present.     Vessels  The aorta root cannot be assessed. The thoracic aorta cannot be assessed.  Unable to assess mean RA pressure given the patient is on a ventilator.     ______________________________________________________________________________  Report approved by: Ruperto Otero 2023 10:14 AM      ______________________________________________________________________________      XR Chest Port 1 View    Narrative    Portable chest    INDICATION: Increasing hypoxia an oxygen needs    COMPARISON: 9/25/2023    FINDINGS: Heart size normal. Marked new opacification throughout the  left hemithorax which may represent increased effusion versus  consolidation. Endotracheal intubation again with tube tip  approximately 6.2 cm above the lissa. Right IJ catheter tip in the  right atrium. NG/OG tube beyond the inferior margin of the image.  Right lung appears unremarkable and unchanged.      Impression    IMPRESSION: Marked increase opacification of left hemithorax which may  indicate new large effusion. Regular follow-up to clearing in order to  exclude infection/central obstruction.    HYACINTH SUAZO MD         SYSTEM ID:  [id number]   XR Chest Port 1 View    Narrative    Exam: XR CHEST PORT 1 VIEW, 9/28/2023 1:20 PM    Comparison: Same date 12:00 AM    History: ETT advanced, s/p bronch    Findings:  Portable AP view of the chest.  Endotracheal tube tip 4.9 cm from the  lissa. Stable right IJ central venous catheter with tip in the low  SVC. Gastric tube courses inferior to the field of view. Decreased  opacification of the left hemithorax. Hazy opacity at the right lung  base. No definite pneumothorax. No acute osseous abnormality.      Impression    Impression:   1. Interval advancement of endotracheal tube, terminating 4.9 cm from  the lissa. Additional support devices are stable.  2. Decreased opacification of the left hemithorax , representing  pleural fluid end atelectasis/consolidation.  3. Increased hazy opacity at the right lung base, representing small  pleural effusion, edema, and/or atelectasis.    I have personally reviewed the examination and initial interpretation  and I agree with the findings.    JOAN LEBRON MD         SYSTEM ID:  [id number]   XR Chest Port 1 View    Impression    RESIDENT  PRELIMINARY INTERPRETATION  IMPRESSION:  1. Stable small right pleural effusion. No appreciable left effusion.  2. Decreased hazy opacities in the left lung. Stable opacities in the  mid to lower right lung field likely representing atelectasis versus  edema.  3. Support devices are in stable position.       =========================================

## 2023-09-29 NOTE — PROGRESS NOTES
I have seen Mr. Burnham each day this week and been able to touch base with the family most days.  He is now postoperative day 4 from aortobiiliac bypass and resection of ruptured pararenal aneurysm.  Overall, physiologically, he has been improving very slowly.  He still has multiple issues and is critically ill.  He was profoundly unstable and coagulopathic in the OR.  His stability has significantly improved and he is now down to single vasopressor.  His coags have been near normal.  He did need some platelets yesterday for platelet count in the 60s.  He has had decreasing sloughing from the colon with no WBC or lactate changes that suggest ongoing colon ischemia.  His renal failure had transiently stabilized on Wednesday but took a step back yesterday.  With resuscitation his urine output has been maintained.  He has no indication at this point for CRRT, however we will continue to watch very vigilantly.  Unfortunately, his left leg remains the same without signals.  We will continue to monitor this as there are no signs of systemic illness coming from the leg.  Ideally I would like to discuss these issues with the patient when he is extubated we will plan to try to hold the course until then.  He does appear to be responding somewhat to resuscitation so we will continue with this today.  The other interesting finding over the last 24 hours is a critically low prealbumin level which clearly predates his illness over the last several days in the intensive care unit suggesting a component of chronic malnutrition which explains some of the difficulty with intravascular volume resuscitation we have had.  I am taking him this morning for abdominal exploration washout in conjunction with colorectal surgery to evaluate the colon and potentially start to close the belly.  We will also evaluate the leg in the operating room.  I will update the family afterwards.

## 2023-09-29 NOTE — TREATMENT PLAN
Shift summary     Went down to the OR today for abdominal closure attempt and wound vac placement. Patient's unable to follow commands and withdraw from pain in all extremities. Sedated on propofol and Fentanyl. MAP goal > 65 maintained with levo drip on and off. CVP (7-15). Flotrac monitoring started. Fio2 40%, PEEP 8. Tube feeds started @ 10 ml/hr. TPN @ 45 ml/hr. Administered Albumin and Lasix  - minimal urine output. Creatinine trending up, team aw. Family present at bedside throughout shift.     Plan: Maintain hemodynamic status.   Plan of care ongoing.   Maylin Moraes RN on 9/29/2023 at 6:50 PM

## 2023-09-29 NOTE — BRIEF OP NOTE
[facility]    Brief Operative Note    Pre-operative diagnosis: Infrarenal abdominal aortic aneurysm, ruptured (H) [I71.33]  Post-operative diagnosis Same as pre-operative diagnosis    Procedure: exploration of  ABDOMINAL CAVITY, placement of abthera, N/A - Abdomen  exploration of left lower extremity, partial closure of left lower extremity, wound vac placement, Left - Leg    Surgeon: Surgeon(s) and Role:     * Boris Lowe - Primary     * Ada Donald MD - Fellow - Assisting  Anesthesia: General   Estimated Blood Loss: 10cc    Drains: None  Specimens: * No specimens in log *  Findings:   Healthy appearing small bowel, colon with no evidence of ischemia .  15cm x 8cm open wound in medial left leg, closed distal 5cm of wound and 1cm proximally, leaving 9x5.5cm open wound which was packed with wound vac sponge.   Abthera left in place on abdomen.  Complications: None.  Implants: * No implants in log *

## 2023-09-29 NOTE — PROGRESS NOTES
Vascular Surgery Progress Note  09/29/2023       Subjective:  Overall doing slightly better this morning, off norepinephrine continues on double dose of vasopressin. Lactate normal, renal function continued to worsen serum creatinine at 5.49 this morning with slight correction in metabolic acidosis. LFTs improving, down to ALT/AST85/267, no leukocytosis. Received 2 doses of albumin, 1 PLTs and 1 unit of PRBC last night. Found to have severely low prealbumin, down to 6, likely ongoing malnutrition prior to his admission.      Objective:  Temp:  [99.3  F (37.4  C)-100.6  F (38.1  C)] 99.9  F (37.7  C)  Pulse:  [83-96] 90  Resp:  [15-20] 16  MAP:  [58 mmHg-87 mmHg] 76 mmHg  Arterial Line BP: ()/(48-68) 103/59  FiO2 (%):  [40 %-60 %] 50 %  SpO2:  [93 %-100 %] 99 %    I/O last 3 completed shifts:  In: 6501.4 [I.V.:3706.4]  Out: 3946 [Urine:146; Emesis/NG output:1100; Drains:2700]      Gen: intubated, sedated, moves upper extremities spontaneously, not to command  CV: RR per radial pulse, off pressors, sinus tach per tele  Resp: intubated, on ventilator  Abd: wound vac in place, holding seal, abdomen somewhat soft to palpation around VAC.    Ext: RLE wwp, mulitphasic DP and PT signal. LLE cold to touch, dusky. no DP/PT signals, no popliteal. Left femoral with doppler signal. Compartments soft, compressible.     UOP: approximately 5-10ml every 2 hrsr   Temp abd closure output: ~ 100-150ml/hr  NGT with bilious drainage ~100 to 150 mL/h       Labs:  Recent Labs   Lab 09/29/23  0901 09/29/23  0351 09/29/23  0010 09/28/23 2059   WBC  --  5.6 5.2 4.7   HGB 9.0* 8.2* 8.1* 7.5*   PLT  --  100* 113* 94*       Recent Labs   Lab 09/29/23  0901 09/29/23  0351 09/29/23  0349 09/29/23  0010 09/29/23  0009 09/28/23 2059    140  --  139  --  140   POTASSIUM 3.7 4.0  --  4.1  --  4.2   CHLORIDE  --  109*  --  110*  --  109*   CO2  --  17*  --  18*  --  17*   BUN  --  50.3*  --  48.8*  --  44.1*   CR  --  5.49*  --  5.16*  --   5.27*   * 128* 130* 126*   < > 119*   OMAR  --  7.5*  --  7.4*  --  7.3*   MAG  --  2.1  --  2.0  --  2.1   PHOS  --  2.9  --  2.9  --  3.0    < > = values in this interval not displayed.       Imaging:  XR Chest Port 1 View    Result Date: 9/25/2023  EXAM: XR CHEST PORT 1 VIEW  9/25/2023 4:00 AM HISTORY:  Endotracheal tube positioning   COMPARISON:  None TECHNIQUE: Portable AP supine radiograph of chest FINDINGS: Endotracheal tube tip is in the mid thoracic trachea. Right IJ CVC tip projects over the cavoatrial junction.. Enteric tube projects over the stomach. The trachea is midline. The cardiomediastinal silhouette is within normal limits. The pulmonary vasculature is distinct. No appreciable pneumothorax or pleural effusion. No focal airspace consolidation. No acute osseous abnormality.     IMPRESSION: 1. Endotracheal tube tip is in the mid thoracic trachea. 2. No acute airspace disease. Streaky perihilar opacities likely represent atelectasis/edema. I have personally reviewed the examination and initial interpretation and I agree with the findings. ILEANA SIEGEL MD   SYSTEM ID:  [id number]    XR Abdomen Port 1 View    Result Date: 9/25/2023  Exam: XR ABDOMEN PORT 1 VIEW, 9/25/2023 4:12 AM Indication: NGT in place Comparison: None Findings: Portable AP supine radiograph of the abdomen. Enteric tube tip and sidehole project over the stomach. Partially visualized central venous catheter tip projecting over the right atrium. Surgical packing material projects over the abdomen. Multiple surgical clips projecting over the abdomen. Nonobstructive bowel gas pattern. No pneumatosis or portal venous gas.     Impression: 1. Gastric tube tip and sidehole project over the stomach. Packing material present in the abdomen. 2. Nonobstructive bowel gas pattern. I have personally reviewed the examination and initial interpretation and I agree with the findings. ILEANA SIEGEL MD   SYSTEM ID:  [id number]    ADDIS with  Report    Result Date: 9/25/2023  Mason Foss MD     9/25/2023  7:53 AM Perioperative ADDIS Procedure Note Staff -      Anesthesiologist:  Mason Foss MD      Performed By: anesthesiologist Preanesthesia Checklist:  Patient identified, IV assessed, risks and benefits discussed, monitors and equipment assessed, procedure being performed at surgeon's request and anesthesia consent obtained. ADDIS Probe Insertion Probe Number: Loaner 1 Probe Status PRE Insertion: NO obvious damage Probe type:  Adult 3D Bite block used:   Oral Airway Insertion Technique: Recurrent attempts, Laryngoscopy Insertion complications: None obvious Billing Report:A ADDIS report is NOT being generated. Probe Status POST Removal: NO obvious damage        Assessment/Plan:   68 year old male with PMH of HTN and previous TIA who presented with R sided abdominal pain found to have contained ruptured AAA, now s/p open AAA repair 9/24 into 9/25am with 30 min supraceliac clamp time, prolonged infrarenal clamp time, temporary abdominal closure. He remains critically ill in the ICU, although with some improvements in his lactic acidosis, he had bloody stool 9/25 am with flex sig 9/25/23, ongoing abssent signals in his LLE concerning for ischemia. We will continue to monitor LLE given patient overall clinical status is not stable enough for further intervention.    - RTOR this morning for washout, found to have healthy appearing small bowel, no evidence of ischemia. Medial LLE with partial closure, remaining open wound now with VAC.   - WOC consult for VAC changes in LLE starting Monday 10/2/23.  - Will likely need general surgery assistance with potential mesh augmentation for abdomen closure in the coming days.  - Okay with trickle feeds, no evidence of bowel ischemia on exam in OR this morning  - Please continue with volume resuscitation, patient is intravascularly dry  - Monitor renal function and electrolytes, serum creatinine at 5.49.  Will likely need  nephrology consult potential Lasix challenge, however needs to stabilize given return to OR this morning. Please reach out to vascular surgery when this is considered.  - Replace iCal, continue with volume resuscitation. With unmeasurable volume loss given open abdomen and return to the OR this morning.  - Appreciate GI following given bloody stools   - Broad spectrum abx to cover for gram negatives in setting of possible bowel ischemia  - Pain/sedation medication per ICU   - Continue vent support, wean as able  - Wean pressors as tolerated  - Goal MAP >65  - Continue insulin gtt as needed  - Continue aparicio     Discussed with vascular surgery staff, Dr. Kandace Mcleod who is in agreement with the above.    Miryam Carrasco, SACHIN  Vascular Surgery  Pager: [phone]  [email or web address]  Send message or 10 digit call back number Securely via [employer] with the [employer] Web Console (learn more here)

## 2023-09-29 NOTE — ANESTHESIA CARE TRANSFER NOTE
Patient: Alfredo Burnham    Procedure: Procedure(s):  exploration of  ABDOMINAL CAVITY, placement of abthera  exploration of left lower extremity, partial closure of left lower extremity, wound vac placement       Diagnosis: Infrarenal abdominal aortic aneurysm, ruptured (H) [I71.33]  Diagnosis Additional Information: No value filed.    Anesthesia Type:   No value filed.     Note:    Oropharynx: endotracheal tube in place  Level of Consciousness: iatrogenic sedation      Independent Airway: airway patency not satisfactory and stable  Dentition: dentition unchanged  Vital Signs Stable: post-procedure vital signs reviewed and stable  Report to RN Given: handoff report given  Patient transferred to: ICU  Comments: Pt transferred from OR to ICU. Stable throughout transport. Report to RN at bedside.   ICU Handoff: Call for PAUSE to initiate/utilize ICU HANDOFF, Identified Patient, Identified Responsible Provider, Reviewed the Pertinent Medical History, Discussed Surgical Course, Reviewed Intra-OP Anesthesia Management and Issues during Anesthesia, Set Expectations for Post Procedure Period and Allowed Opportunity for Questions and Acknowledgement of Understanding      Vitals:  Vitals Value Taken Time   BP     Temp     Pulse 95 09/29/23 1001   Resp 0 09/29/23 0959   SpO2 96 % 09/29/23 1001   Vitals shown include unvalidated device data.    Electronically Signed By: ALTAGRACIA Morley CRNA  September 29, 2023  10:02 AM

## 2023-09-30 ENCOUNTER — APPOINTMENT (OUTPATIENT)
Dept: ULTRASOUND IMAGING | Facility: CLINIC | Age: 68
DRG: 268 | End: 2023-09-30
Attending: NURSE PRACTITIONER
Payer: COMMERCIAL

## 2023-09-30 ENCOUNTER — APPOINTMENT (OUTPATIENT)
Dept: GENERAL RADIOLOGY | Facility: CLINIC | Age: 68
DRG: 268 | End: 2023-09-30
Attending: SURGERY
Payer: COMMERCIAL

## 2023-09-30 LAB
ALBUMIN SERPL BCG-MCNC: 2.1 G/DL (ref 3.5–5.2)
ALBUMIN SERPL BCG-MCNC: 2.2 G/DL (ref 3.5–5.2)
ALBUMIN SERPL BCG-MCNC: 2.3 G/DL (ref 3.5–5.2)
ALBUMIN SERPL BCG-MCNC: 2.5 G/DL (ref 3.5–5.2)
ALBUMIN UR-MCNC: 200 MG/DL
ALBUMIN UR-MCNC: 200 MG/DL
ALLEN'S TEST: ABNORMAL
ALP SERPL-CCNC: 75 U/L (ref 40–129)
ALP SERPL-CCNC: 75 U/L (ref 40–129)
ALP SERPL-CCNC: 76 U/L (ref 40–129)
ALT SERPL W P-5'-P-CCNC: 30 U/L (ref 0–70)
ALT SERPL W P-5'-P-CCNC: 36 U/L (ref 0–70)
ALT SERPL W P-5'-P-CCNC: 37 U/L (ref 0–70)
ANION GAP SERPL CALCULATED.3IONS-SCNC: 13 MMOL/L (ref 7–15)
ANION GAP SERPL CALCULATED.3IONS-SCNC: 15 MMOL/L (ref 7–15)
ANION GAP SERPL CALCULATED.3IONS-SCNC: 15 MMOL/L (ref 7–15)
ANION GAP SERPL CALCULATED.3IONS-SCNC: 16 MMOL/L (ref 7–15)
APPEARANCE UR: ABNORMAL
APPEARANCE UR: ABNORMAL
APTT PPP: 37 SECONDS (ref 22–38)
APTT PPP: 38 SECONDS (ref 22–38)
APTT PPP: 39 SECONDS (ref 22–38)
AST SERPL W P-5'-P-CCNC: 125 U/L (ref 0–45)
AST SERPL W P-5'-P-CCNC: 133 U/L (ref 0–45)
AST SERPL W P-5'-P-CCNC: 135 U/L (ref 0–45)
BACTERIA BRONCH: ABNORMAL
BASE EXCESS BLDA CALC-SCNC: -4.6 MMOL/L (ref -9–1.8)
BASE EXCESS BLDA CALC-SCNC: -5.7 MMOL/L (ref -9–1.8)
BASE EXCESS BLDA CALC-SCNC: -5.9 MMOL/L (ref -9–1.8)
BILIRUB SERPL-MCNC: 1.4 MG/DL
BILIRUB SERPL-MCNC: 1.4 MG/DL
BILIRUB SERPL-MCNC: 1.5 MG/DL
BILIRUB UR QL STRIP: NEGATIVE
BILIRUB UR QL STRIP: NEGATIVE
BLD PROD TYP BPU: NORMAL
BLOOD COMPONENT TYPE: NORMAL
BUN SERPL-MCNC: 71.7 MG/DL (ref 8–23)
BUN SERPL-MCNC: 72.4 MG/DL (ref 8–23)
BUN SERPL-MCNC: 74.5 MG/DL (ref 8–23)
BUN SERPL-MCNC: 74.6 MG/DL (ref 8–23)
CA-I BLD-MCNC: 4.3 MG/DL (ref 4.4–5.2)
CA-I BLD-MCNC: 4.5 MG/DL (ref 4.4–5.2)
CA-I BLD-MCNC: 4.6 MG/DL (ref 4.4–5.2)
CALCIUM SERPL-MCNC: 7.2 MG/DL (ref 8.8–10.2)
CALCIUM SERPL-MCNC: 7.8 MG/DL (ref 8.8–10.2)
CALCIUM SERPL-MCNC: 7.9 MG/DL (ref 8.8–10.2)
CALCIUM SERPL-MCNC: 7.9 MG/DL (ref 8.8–10.2)
CHLORIDE SERPL-SCNC: 108 MMOL/L (ref 98–107)
CHLORIDE SERPL-SCNC: 108 MMOL/L (ref 98–107)
CHLORIDE SERPL-SCNC: 109 MMOL/L (ref 98–107)
CHLORIDE SERPL-SCNC: 110 MMOL/L (ref 98–107)
CODING SYSTEM: NORMAL
COLOR UR AUTO: YELLOW
COLOR UR AUTO: YELLOW
CREAT SERPL-MCNC: 5.22 MG/DL (ref 0.67–1.17)
CREAT SERPL-MCNC: 6.09 MG/DL (ref 0.67–1.17)
CREAT SERPL-MCNC: 6.11 MG/DL (ref 0.67–1.17)
CREAT SERPL-MCNC: 6.18 MG/DL (ref 0.67–1.17)
CREAT SERPL-MCNC: 6.33 MG/DL (ref 0.67–1.17)
CREAT UR-MCNC: 29.2 MG/DL
DEPRECATED HCO3 PLAS-SCNC: 15 MMOL/L (ref 22–29)
DEPRECATED HCO3 PLAS-SCNC: 17 MMOL/L (ref 22–29)
DEPRECATED HCO3 PLAS-SCNC: 17 MMOL/L (ref 22–29)
DEPRECATED HCO3 PLAS-SCNC: 19 MMOL/L (ref 22–29)
EGFRCR SERPLBLD CKD-EPI 2021: 11 ML/MIN/1.73M2
EGFRCR SERPLBLD CKD-EPI 2021: 9 ML/MIN/1.73M2
ERYTHROCYTE [DISTWIDTH] IN BLOOD BY AUTOMATED COUNT: 17.9 % (ref 10–15)
ERYTHROCYTE [DISTWIDTH] IN BLOOD BY AUTOMATED COUNT: 18 % (ref 10–15)
ERYTHROCYTE [DISTWIDTH] IN BLOOD BY AUTOMATED COUNT: 18 % (ref 10–15)
FIBRINOGEN PPP-MCNC: 560 MG/DL (ref 170–490)
FIBRINOGEN PPP-MCNC: 565 MG/DL (ref 170–490)
FIBRINOGEN PPP-MCNC: 606 MG/DL (ref 170–490)
FRACT EXCRET NA UR+SERPL-RTO: 15 %
GLUCOSE BLDC GLUCOMTR-MCNC: 123 MG/DL (ref 70–99)
GLUCOSE BLDC GLUCOMTR-MCNC: 143 MG/DL (ref 70–99)
GLUCOSE BLDC GLUCOMTR-MCNC: 144 MG/DL (ref 70–99)
GLUCOSE BLDC GLUCOMTR-MCNC: 144 MG/DL (ref 70–99)
GLUCOSE BLDC GLUCOMTR-MCNC: 149 MG/DL (ref 70–99)
GLUCOSE BLDC GLUCOMTR-MCNC: 152 MG/DL (ref 70–99)
GLUCOSE SERPL-MCNC: 136 MG/DL (ref 70–99)
GLUCOSE SERPL-MCNC: 140 MG/DL (ref 70–99)
GLUCOSE SERPL-MCNC: 142 MG/DL (ref 70–99)
GLUCOSE SERPL-MCNC: 145 MG/DL (ref 70–99)
GLUCOSE UR STRIP-MCNC: NEGATIVE MG/DL
GLUCOSE UR STRIP-MCNC: NEGATIVE MG/DL
GRAM STAIN RESULT: ABNORMAL
GRAM STAIN RESULT: ABNORMAL
HCO3 BLD-SCNC: 19 MMOL/L (ref 21–28)
HCT VFR BLD AUTO: 25 % (ref 40–53)
HCT VFR BLD AUTO: 25.3 % (ref 40–53)
HCT VFR BLD AUTO: 25.9 % (ref 40–53)
HGB BLD-MCNC: 8.2 G/DL (ref 13.3–17.7)
HGB BLD-MCNC: 8.2 G/DL (ref 13.3–17.7)
HGB BLD-MCNC: 8.5 G/DL (ref 13.3–17.7)
HGB UR QL STRIP: ABNORMAL
HGB UR QL STRIP: ABNORMAL
INR PPP: 1.31 (ref 0.85–1.15)
INR PPP: 1.35 (ref 0.85–1.15)
INR PPP: 1.36 (ref 0.85–1.15)
ISSUE DATE AND TIME: NORMAL
KETONES UR STRIP-MCNC: NEGATIVE MG/DL
KETONES UR STRIP-MCNC: NEGATIVE MG/DL
LACTATE SERPL-SCNC: 1.3 MMOL/L (ref 0.7–2)
LACTATE SERPL-SCNC: 1.3 MMOL/L (ref 0.7–2)
LACTATE SERPL-SCNC: 1.4 MMOL/L (ref 0.7–2)
LACTATE SERPL-SCNC: 1.4 MMOL/L (ref 0.7–2)
LEUKOCYTE ESTERASE UR QL STRIP: NEGATIVE
LEUKOCYTE ESTERASE UR QL STRIP: NEGATIVE
MAGNESIUM SERPL-MCNC: 1.8 MG/DL (ref 1.7–2.3)
MAGNESIUM SERPL-MCNC: 1.9 MG/DL (ref 1.7–2.3)
MAGNESIUM SERPL-MCNC: 2 MG/DL (ref 1.7–2.3)
MAGNESIUM SERPL-MCNC: 2 MG/DL (ref 1.7–2.3)
MCH RBC QN AUTO: 32.5 PG (ref 26.5–33)
MCH RBC QN AUTO: 32.9 PG (ref 26.5–33)
MCH RBC QN AUTO: 32.9 PG (ref 26.5–33)
MCHC RBC AUTO-ENTMCNC: 32.4 G/DL (ref 31.5–36.5)
MCHC RBC AUTO-ENTMCNC: 32.8 G/DL (ref 31.5–36.5)
MCHC RBC AUTO-ENTMCNC: 32.8 G/DL (ref 31.5–36.5)
MCV RBC AUTO: 100 FL (ref 78–100)
MCV RBC AUTO: 102 FL (ref 78–100)
MCV RBC AUTO: 99 FL (ref 78–100)
MUCOUS THREADS #/AREA URNS LPF: PRESENT /LPF
MUCOUS THREADS #/AREA URNS LPF: PRESENT /LPF
NITRATE UR QL: NEGATIVE
NITRATE UR QL: NEGATIVE
O2/TOTAL GAS SETTING VFR VENT: 50 %
OXYHGB MFR BLD: 93 % (ref 92–100)
OXYHGB MFR BLD: 94 % (ref 92–100)
OXYHGB MFR BLD: 99 % (ref 92–100)
PCO2 BLD: 29 MM HG (ref 35–45)
PCO2 BLD: 35 MM HG (ref 35–45)
PCO2 BLD: 35 MM HG (ref 35–45)
PH BLD: 7.35 [PH] (ref 7.35–7.45)
PH BLD: 7.35 [PH] (ref 7.35–7.45)
PH BLD: 7.42 [PH] (ref 7.35–7.45)
PH UR STRIP: 6 [PH] (ref 5–7)
PH UR STRIP: 6 [PH] (ref 5–7)
PHOSPHATE SERPL-MCNC: 1.7 MG/DL (ref 2.5–4.5)
PHOSPHATE SERPL-MCNC: 1.9 MG/DL (ref 2.5–4.5)
PHOSPHATE SERPL-MCNC: 2.2 MG/DL (ref 2.5–4.5)
PHOSPHATE SERPL-MCNC: 2.3 MG/DL (ref 2.5–4.5)
PHOSPHATE SERPL-MCNC: 2.8 MG/DL (ref 2.5–4.5)
PLATELET # BLD AUTO: 64 10E3/UL (ref 150–450)
PLATELET # BLD AUTO: 74 10E3/UL (ref 150–450)
PLATELET # BLD AUTO: 75 10E3/UL (ref 150–450)
PO2 BLD: 198 MM HG (ref 80–105)
PO2 BLD: 65 MM HG (ref 80–105)
PO2 BLD: 72 MM HG (ref 80–105)
POTASSIUM SERPL-SCNC: 3.4 MMOL/L (ref 3.4–5.3)
POTASSIUM SERPL-SCNC: 3.8 MMOL/L (ref 3.4–5.3)
POTASSIUM SERPL-SCNC: 3.8 MMOL/L (ref 3.4–5.3)
POTASSIUM SERPL-SCNC: 3.9 MMOL/L (ref 3.4–5.3)
POTASSIUM SERPL-SCNC: 4 MMOL/L (ref 3.4–5.3)
PROT SERPL-MCNC: 3.9 G/DL (ref 6.4–8.3)
PROT SERPL-MCNC: 4.1 G/DL (ref 6.4–8.3)
PROT SERPL-MCNC: 4.2 G/DL (ref 6.4–8.3)
RBC # BLD AUTO: 2.49 10E6/UL (ref 4.4–5.9)
RBC # BLD AUTO: 2.52 10E6/UL (ref 4.4–5.9)
RBC # BLD AUTO: 2.58 10E6/UL (ref 4.4–5.9)
RBC URINE: 37 /HPF
RBC URINE: 42 /HPF
SODIUM SERPL-SCNC: 140 MMOL/L (ref 135–145)
SODIUM SERPL-SCNC: 140 MMOL/L (ref 135–145)
SODIUM SERPL-SCNC: 141 MMOL/L (ref 135–145)
SODIUM SERPL-SCNC: 141 MMOL/L (ref 135–145)
SODIUM UR-SCNC: 97 MMOL/L
SP GR UR STRIP: 1.01 (ref 1–1.03)
SP GR UR STRIP: 1.01 (ref 1–1.03)
TRANSITIONAL EPI: <1 /HPF
UNIT ABO/RH: NORMAL
UNIT NUMBER: NORMAL
UNIT STATUS: NORMAL
UNIT TYPE ISBT: 7300
UROBILINOGEN UR STRIP-MCNC: NORMAL MG/DL
UROBILINOGEN UR STRIP-MCNC: NORMAL MG/DL
WBC # BLD AUTO: 7.4 10E3/UL (ref 4–11)
WBC # BLD AUTO: 7.5 10E3/UL (ref 4–11)
WBC # BLD AUTO: 8 10E3/UL (ref 4–11)
WBC URINE: 12 /HPF
WBC URINE: 7 /HPF

## 2023-09-30 PROCEDURE — 84100 ASSAY OF PHOSPHORUS: CPT | Performed by: INTERNAL MEDICINE

## 2023-09-30 PROCEDURE — 82330 ASSAY OF CALCIUM: CPT | Performed by: PHARMACIST

## 2023-09-30 PROCEDURE — 250N000013 HC RX MED GY IP 250 OP 250 PS 637: Performed by: STUDENT IN AN ORGANIZED HEALTH CARE EDUCATION/TRAINING PROGRAM

## 2023-09-30 PROCEDURE — 36556 INSERT NON-TUNNEL CV CATH: CPT | Performed by: NURSE PRACTITIONER

## 2023-09-30 PROCEDURE — 250N000012 HC RX MED GY IP 250 OP 636 PS 637: Performed by: NURSE PRACTITIONER

## 2023-09-30 PROCEDURE — 90947 DIALYSIS REPEATED EVAL: CPT

## 2023-09-30 PROCEDURE — 85730 THROMBOPLASTIN TIME PARTIAL: CPT | Performed by: PHYSICIAN ASSISTANT

## 2023-09-30 PROCEDURE — 5A1D90Z PERFORMANCE OF URINARY FILTRATION, CONTINUOUS, GREATER THAN 18 HOURS PER DAY: ICD-10-PCS | Performed by: INTERNAL MEDICINE

## 2023-09-30 PROCEDURE — 71045 X-RAY EXAM CHEST 1 VIEW: CPT

## 2023-09-30 PROCEDURE — 71045 X-RAY EXAM CHEST 1 VIEW: CPT | Mod: 26 | Performed by: RADIOLOGY

## 2023-09-30 PROCEDURE — 82805 BLOOD GASES W/O2 SATURATION: CPT | Performed by: PHARMACIST

## 2023-09-30 PROCEDURE — 999N000157 HC STATISTIC RCP TIME EA 10 MIN

## 2023-09-30 PROCEDURE — 83735 ASSAY OF MAGNESIUM: CPT | Performed by: PHYSICIAN ASSISTANT

## 2023-09-30 PROCEDURE — 250N000011 HC RX IP 250 OP 636: Mod: JZ | Performed by: NURSE PRACTITIONER

## 2023-09-30 PROCEDURE — 250N000011 HC RX IP 250 OP 636

## 2023-09-30 PROCEDURE — 93005 ELECTROCARDIOGRAM TRACING: CPT

## 2023-09-30 PROCEDURE — 85610 PROTHROMBIN TIME: CPT | Performed by: PHYSICIAN ASSISTANT

## 2023-09-30 PROCEDURE — 250N000009 HC RX 250: Performed by: INTERNAL MEDICINE

## 2023-09-30 PROCEDURE — 250N000013 HC RX MED GY IP 250 OP 250 PS 637

## 2023-09-30 PROCEDURE — 258N000003 HC RX IP 258 OP 636: Performed by: SURGERY

## 2023-09-30 PROCEDURE — 81001 URINALYSIS AUTO W/SCOPE: CPT | Performed by: STUDENT IN AN ORGANIZED HEALTH CARE EDUCATION/TRAINING PROGRAM

## 2023-09-30 PROCEDURE — 93970 EXTREMITY STUDY: CPT | Mod: 26 | Performed by: RADIOLOGY

## 2023-09-30 PROCEDURE — 99292 CRITICAL CARE ADDL 30 MIN: CPT | Mod: 25 | Performed by: SURGERY

## 2023-09-30 PROCEDURE — 06HY33Z INSERTION OF INFUSION DEVICE INTO LOWER VEIN, PERCUTANEOUS APPROACH: ICD-10-PCS | Performed by: NURSE PRACTITIONER

## 2023-09-30 PROCEDURE — P9037 PLATE PHERES LEUKOREDU IRRAD: HCPCS | Performed by: PHYSICIAN ASSISTANT

## 2023-09-30 PROCEDURE — 258N000003 HC RX IP 258 OP 636

## 2023-09-30 PROCEDURE — 250N000011 HC RX IP 250 OP 636: Mod: JZ

## 2023-09-30 PROCEDURE — 93970 EXTREMITY STUDY: CPT

## 2023-09-30 PROCEDURE — 250N000011 HC RX IP 250 OP 636: Mod: JZ | Performed by: INTERNAL MEDICINE

## 2023-09-30 PROCEDURE — 82565 ASSAY OF CREATININE: CPT | Performed by: INTERNAL MEDICINE

## 2023-09-30 PROCEDURE — 81001 URINALYSIS AUTO W/SCOPE: CPT | Performed by: CLINICAL NURSE SPECIALIST

## 2023-09-30 PROCEDURE — 84100 ASSAY OF PHOSPHORUS: CPT | Performed by: PHYSICIAN ASSISTANT

## 2023-09-30 PROCEDURE — 200N000002 HC R&B ICU UMMC

## 2023-09-30 PROCEDURE — 84450 TRANSFERASE (AST) (SGOT): CPT | Performed by: PHYSICIAN ASSISTANT

## 2023-09-30 PROCEDURE — 85384 FIBRINOGEN ACTIVITY: CPT | Performed by: PHYSICIAN ASSISTANT

## 2023-09-30 PROCEDURE — 84300 ASSAY OF URINE SODIUM: CPT

## 2023-09-30 PROCEDURE — 250N000011 HC RX IP 250 OP 636: Mod: JZ | Performed by: SURGERY

## 2023-09-30 PROCEDURE — C9113 INJ PANTOPRAZOLE SODIUM, VIA: HCPCS | Mod: JZ | Performed by: STUDENT IN AN ORGANIZED HEALTH CARE EDUCATION/TRAINING PROGRAM

## 2023-09-30 PROCEDURE — 85027 COMPLETE CBC AUTOMATED: CPT | Performed by: PHYSICIAN ASSISTANT

## 2023-09-30 PROCEDURE — 999N000253 HC STATISTIC WEANING TRIALS

## 2023-09-30 PROCEDURE — 84132 ASSAY OF SERUM POTASSIUM: CPT | Performed by: SURGERY

## 2023-09-30 PROCEDURE — 84075 ASSAY ALKALINE PHOSPHATASE: CPT | Performed by: PHYSICIAN ASSISTANT

## 2023-09-30 PROCEDURE — 99233 SBSQ HOSP IP/OBS HIGH 50: CPT | Performed by: INTERNAL MEDICINE

## 2023-09-30 PROCEDURE — 250N000009 HC RX 250

## 2023-09-30 PROCEDURE — 99207 PR NO BILLABLE SERVICE THIS VISIT: CPT | Performed by: SURGERY

## 2023-09-30 PROCEDURE — 83605 ASSAY OF LACTIC ACID: CPT | Performed by: PHARMACIST

## 2023-09-30 PROCEDURE — 250N000011 HC RX IP 250 OP 636: Performed by: PHARMACIST

## 2023-09-30 PROCEDURE — 83735 ASSAY OF MAGNESIUM: CPT | Performed by: INTERNAL MEDICINE

## 2023-09-30 PROCEDURE — 250N000011 HC RX IP 250 OP 636: Performed by: STUDENT IN AN ORGANIZED HEALTH CARE EDUCATION/TRAINING PROGRAM

## 2023-09-30 PROCEDURE — 82565 ASSAY OF CREATININE: CPT

## 2023-09-30 PROCEDURE — 94003 VENT MGMT INPAT SUBQ DAY: CPT

## 2023-09-30 PROCEDURE — 250N000013 HC RX MED GY IP 250 OP 250 PS 637: Performed by: NURSE PRACTITIONER

## 2023-09-30 PROCEDURE — 87305 ASPERGILLUS AG IA: CPT | Performed by: NURSE PRACTITIONER

## 2023-09-30 PROCEDURE — 250N000009 HC RX 250: Performed by: SURGERY

## 2023-09-30 PROCEDURE — 82330 ASSAY OF CALCIUM: CPT | Performed by: INTERNAL MEDICINE

## 2023-09-30 PROCEDURE — 99291 CRITICAL CARE FIRST HOUR: CPT | Mod: 25 | Performed by: SURGERY

## 2023-09-30 PROCEDURE — 83605 ASSAY OF LACTIC ACID: CPT | Performed by: NURSE PRACTITIONER

## 2023-09-30 PROCEDURE — 82040 ASSAY OF SERUM ALBUMIN: CPT | Performed by: PHYSICIAN ASSISTANT

## 2023-09-30 RX ORDER — CALCIUM GLUCONATE 20 MG/ML
2 INJECTION, SOLUTION INTRAVENOUS EVERY 8 HOURS PRN
Status: DISCONTINUED | OUTPATIENT
Start: 2023-09-30 | End: 2023-10-04

## 2023-09-30 RX ORDER — LABETALOL HYDROCHLORIDE 5 MG/ML
20 INJECTION, SOLUTION INTRAVENOUS ONCE
Status: COMPLETED | OUTPATIENT
Start: 2023-09-30 | End: 2023-09-30

## 2023-09-30 RX ORDER — LABETALOL HYDROCHLORIDE 5 MG/ML
10-20 INJECTION, SOLUTION INTRAVENOUS EVERY 6 HOURS PRN
Status: DISCONTINUED | OUTPATIENT
Start: 2023-09-30 | End: 2023-10-01

## 2023-09-30 RX ORDER — CALCIUM GLUCONATE 20 MG/ML
2 INJECTION, SOLUTION INTRAVENOUS ONCE
Status: COMPLETED | OUTPATIENT
Start: 2023-09-30 | End: 2023-09-30

## 2023-09-30 RX ORDER — POTASSIUM PHOS IN 0.9 % NACL 15MMOL/250
15 PLASTIC BAG, INJECTION (ML) INTRAVENOUS EVERY 4 HOURS
Status: COMPLETED | OUTPATIENT
Start: 2023-09-30 | End: 2023-10-01

## 2023-09-30 RX ORDER — POTASSIUM CHLORIDE 29.8 MG/ML
20 INJECTION INTRAVENOUS ONCE
Status: DISCONTINUED | OUTPATIENT
Start: 2023-09-30 | End: 2023-10-02

## 2023-09-30 RX ORDER — FUROSEMIDE 10 MG/ML
120 INJECTION INTRAMUSCULAR; INTRAVENOUS ONCE
Status: COMPLETED | OUTPATIENT
Start: 2023-09-30 | End: 2023-09-30

## 2023-09-30 RX ORDER — DEXTROSE MONOHYDRATE 25 G/50ML
25-50 INJECTION, SOLUTION INTRAVENOUS
Status: DISCONTINUED | OUTPATIENT
Start: 2023-09-30 | End: 2023-09-30

## 2023-09-30 RX ORDER — NICOTINE POLACRILEX 4 MG
15-30 LOZENGE BUCCAL
Status: DISCONTINUED | OUTPATIENT
Start: 2023-09-30 | End: 2023-09-30

## 2023-09-30 RX ORDER — CALCIUM CHLORIDE, MAGNESIUM CHLORIDE, SODIUM CHLORIDE, SODIUM BICARBONATE, POTASSIUM CHLORIDE AND SODIUM PHOSPHATE DIBASIC DIHYDRATE 3.68; 3.05; 6.34; 3.09; .314; .187 G/L; G/L; G/L; G/L; G/L; G/L
12.5 INJECTION INTRAVENOUS CONTINUOUS
Status: DISCONTINUED | OUTPATIENT
Start: 2023-09-30 | End: 2023-10-04

## 2023-09-30 RX ORDER — POTASSIUM PHOS IN 0.9 % NACL 15MMOL/250
15 PLASTIC BAG, INJECTION (ML) INTRAVENOUS ONCE
Status: DISCONTINUED | OUTPATIENT
Start: 2023-09-30 | End: 2023-10-02

## 2023-09-30 RX ORDER — MAGNESIUM SULFATE HEPTAHYDRATE 40 MG/ML
2 INJECTION, SOLUTION INTRAVENOUS EVERY 8 HOURS PRN
Status: DISCONTINUED | OUTPATIENT
Start: 2023-09-30 | End: 2023-10-04

## 2023-09-30 RX ORDER — VORICONAZOLE 40 MG/ML
200 POWDER, FOR SUSPENSION ORAL EVERY 12 HOURS SCHEDULED
Status: DISCONTINUED | OUTPATIENT
Start: 2023-09-30 | End: 2023-10-01

## 2023-09-30 RX ORDER — CALCIUM CHLORIDE, MAGNESIUM CHLORIDE, SODIUM CHLORIDE, SODIUM BICARBONATE, POTASSIUM CHLORIDE AND SODIUM PHOSPHATE DIBASIC DIHYDRATE 3.68; 3.05; 6.34; 3.09; .314; .187 G/L; G/L; G/L; G/L; G/L; G/L
INJECTION INTRAVENOUS CONTINUOUS
Status: DISCONTINUED | OUTPATIENT
Start: 2023-09-30 | End: 2023-10-04

## 2023-09-30 RX ORDER — HEPARIN SODIUM 5000 [USP'U]/.5ML
5000 INJECTION, SOLUTION INTRAVENOUS; SUBCUTANEOUS EVERY 8 HOURS
Status: DISCONTINUED | OUTPATIENT
Start: 2023-09-30 | End: 2023-10-06

## 2023-09-30 RX ORDER — POTASSIUM CHLORIDE 29.8 MG/ML
20 INJECTION INTRAVENOUS EVERY 8 HOURS PRN
Status: DISCONTINUED | OUTPATIENT
Start: 2023-09-30 | End: 2023-10-04

## 2023-09-30 RX ADMIN — INSULIN ASPART 1 UNITS: 100 INJECTION, SOLUTION INTRAVENOUS; SUBCUTANEOUS at 16:48

## 2023-09-30 RX ADMIN — CALCIUM CHLORIDE, MAGNESIUM CHLORIDE, SODIUM CHLORIDE, SODIUM BICARBONATE, POTASSIUM CHLORIDE AND SODIUM PHOSPHATE DIBASIC DIHYDRATE: 3.68; 3.05; 6.34; 3.09; .314; .187 INJECTION INTRAVENOUS at 13:19

## 2023-09-30 RX ADMIN — POTASSIUM PHOSPHATE, MONOBASIC POTASSIUM PHOSPHATE, DIBASIC 15 MMOL: 224; 236 INJECTION, SOLUTION, CONCENTRATE INTRAVENOUS at 22:01

## 2023-09-30 RX ADMIN — POTASSIUM PHOSPHATE, MONOBASIC POTASSIUM PHOSPHATE, DIBASIC 15 MMOL: 224; 236 INJECTION, SOLUTION, CONCENTRATE INTRAVENOUS at 17:32

## 2023-09-30 RX ADMIN — HEPARIN SODIUM 5000 UNITS: 5000 INJECTION, SOLUTION INTRAVENOUS; SUBCUTANEOUS at 09:22

## 2023-09-30 RX ADMIN — CALCIUM CHLORIDE, MAGNESIUM CHLORIDE, SODIUM CHLORIDE, SODIUM BICARBONATE, POTASSIUM CHLORIDE AND SODIUM PHOSPHATE DIBASIC DIHYDRATE 12.5 ML/KG/HR: 3.68; 3.05; 6.34; 3.09; .314; .187 INJECTION INTRAVENOUS at 17:57

## 2023-09-30 RX ADMIN — POTASSIUM CHLORIDE 20 MEQ: 29.8 INJECTION, SOLUTION INTRAVENOUS at 12:27

## 2023-09-30 RX ADMIN — MAGNESIUM SULFATE HEPTAHYDRATE: 500 INJECTION, SOLUTION INTRAMUSCULAR; INTRAVENOUS at 19:31

## 2023-09-30 RX ADMIN — HEPARIN SODIUM 5000 UNITS: 5000 INJECTION, SOLUTION INTRAVENOUS; SUBCUTANEOUS at 16:46

## 2023-09-30 RX ADMIN — Medication 125 MCG/HR: at 13:57

## 2023-09-30 RX ADMIN — VORICONAZOLE 200 MG: 40 POWDER, FOR SUSPENSION ORAL at 17:32

## 2023-09-30 RX ADMIN — CALCIUM CHLORIDE, MAGNESIUM CHLORIDE, SODIUM CHLORIDE, SODIUM BICARBONATE, POTASSIUM CHLORIDE AND SODIUM PHOSPHATE DIBASIC DIHYDRATE 12.5 ML/KG/HR: 3.68; 3.05; 6.34; 3.09; .314; .187 INJECTION INTRAVENOUS at 13:18

## 2023-09-30 RX ADMIN — INSULIN ASPART 1 UNITS: 100 INJECTION, SOLUTION INTRAVENOUS; SUBCUTANEOUS at 13:17

## 2023-09-30 RX ADMIN — PROPOFOL 20 MCG/KG/MIN: 10 INJECTION, EMULSION INTRAVENOUS at 01:12

## 2023-09-30 RX ADMIN — CHLORHEXIDINE GLUCONATE 0.12% ORAL RINSE 15 ML: 1.2 LIQUID ORAL at 08:09

## 2023-09-30 RX ADMIN — CALCIUM CHLORIDE, MAGNESIUM CHLORIDE, SODIUM CHLORIDE, SODIUM BICARBONATE, POTASSIUM CHLORIDE AND SODIUM PHOSPHATE DIBASIC DIHYDRATE 12.5 ML/KG/HR: 3.68; 3.05; 6.34; 3.09; .314; .187 INJECTION INTRAVENOUS at 22:01

## 2023-09-30 RX ADMIN — INSULIN ASPART 1 UNITS: 100 INJECTION, SOLUTION INTRAVENOUS; SUBCUTANEOUS at 09:23

## 2023-09-30 RX ADMIN — HYDRALAZINE HYDROCHLORIDE 10 MG: 20 INJECTION INTRAMUSCULAR; INTRAVENOUS at 09:02

## 2023-09-30 RX ADMIN — CALCIUM GLUCONATE 2 G: 20 INJECTION, SOLUTION INTRAVENOUS at 21:31

## 2023-09-30 RX ADMIN — PROPOFOL 15 MCG/KG/MIN: 10 INJECTION, EMULSION INTRAVENOUS at 22:01

## 2023-09-30 RX ADMIN — FUROSEMIDE 120 MG: 10 INJECTION, SOLUTION INTRAVENOUS at 08:50

## 2023-09-30 RX ADMIN — PROPOFOL 25 MCG/KG/MIN: 10 INJECTION, EMULSION INTRAVENOUS at 10:58

## 2023-09-30 RX ADMIN — CHLORHEXIDINE GLUCONATE 0.12% ORAL RINSE 15 ML: 1.2 LIQUID ORAL at 19:31

## 2023-09-30 RX ADMIN — FUROSEMIDE 10 MG/HR: 10 INJECTION, SOLUTION INTRAVENOUS at 10:08

## 2023-09-30 RX ADMIN — PANTOPRAZOLE SODIUM 40 MG: 40 INJECTION, POWDER, FOR SOLUTION INTRAVENOUS at 08:09

## 2023-09-30 RX ADMIN — METRONIDAZOLE 500 MG: 5 INJECTION, SOLUTION INTRAVENOUS at 08:09

## 2023-09-30 RX ADMIN — Medication 10 ML: at 08:15

## 2023-09-30 RX ADMIN — PROPOFOL 15 MCG/KG/MIN: 10 INJECTION, EMULSION INTRAVENOUS at 14:00

## 2023-09-30 ASSESSMENT — ACTIVITIES OF DAILY LIVING (ADL)
ADLS_ACUITY_SCORE: 43
FALL_HISTORY_WITHIN_LAST_SIX_MONTHS: NO
ADLS_ACUITY_SCORE: 43
DIFFICULTY_EATING/SWALLOWING: OTHER (SEE COMMENTS)
DOING_ERRANDS_INDEPENDENTLY_DIFFICULTY: OTHER (SEE COMMENTS)
ADLS_ACUITY_SCORE: 43
CONCENTRATING,_REMEMBERING_OR_MAKING_DECISIONS_DIFFICULTY: OTHER (SEE COMMENTS)
DRESSING/BATHING_DIFFICULTY: OTHER (SEE COMMENTS)
TOILETING_ISSUES: OTHER (SEE COMMENTS)
CHANGE_IN_FUNCTIONAL_STATUS_SINCE_ONSET_OF_CURRENT_ILLNESS/INJURY: NO
WEAR_GLASSES_OR_BLIND: YES
ADLS_ACUITY_SCORE: 43
ADLS_ACUITY_SCORE: 43
WALKING_OR_CLIMBING_STAIRS_DIFFICULTY: OTHER (SEE COMMENTS)
ADLS_ACUITY_SCORE: 43
ADLS_ACUITY_SCORE: 47
ADLS_ACUITY_SCORE: 43

## 2023-09-30 NOTE — PLAN OF CARE
ICU End of Shift Summary. See flowsheets for vital signs and detailed assessment.    Changes this shift: No acute changes overnight. Remains sedated on propofol and fentanyl, opens eyes to pain, not following commands or withdrawing. Small amount of thick secretions from ETT, FiO2 50%. Remains off pressors, CVP 13. No BM overnight, minimal urine output 7-10ml/hr). Arms very edematous and weeping, scleral/periorbital/facial edema. Left foot remains w/ no pulses. Platelets 74 this AM, discussed platelet goal with SICU and vascular, no current signs of bleeding so holding off on transfusing.     Bilateral mitt restraints discontinued at midnight. Restraint discontinue criteria met, patient is not reaching for lines/tubes.    Plan: Continue POC. Possibility of CRRT today. Notify team of changes.

## 2023-09-30 NOTE — PLAN OF CARE
Problem: CRRT (Continuous Renal Replacement Therapy)  Goal: Safe, Effective Therapy Delivery  Outcome: Progressing   Goal Outcome Evaluation:  D. On CRRT- started at 1330- vss- pulling 40 ml/hr for goal of 25cc/hr removal rate   A line working well R groin - pressures wnl-  I cont to dialize check labs Q 6 hours as ordered

## 2023-09-30 NOTE — PROGRESS NOTES
Nephrology Progress Note  09/30/2023         Assessment & Recommendations:   Alfredo Burnham is a 68 year old year old male    hx of TIA, HTN, blindness admitted 9/24 with AAA with a contained rupture.  Taken to OR for aortobiiliac bypass and resection of ruptured pararenal aneurysm.   Post op course complicated by hypotension requiring pressors and metabolic acidosis.  Baseline Cr 1.0 on presentation but on the rise to >5 at time of renal consult for MAYA management and possible RRT.      # MAYA - b/l creatinine 1 mg/dL. ATN based on granular casts on urine microscopy, due to ruptured AAA, hypotension intra-op (required pressors) along with IV contrast.  - creatinine 6.3 from 5.2 yesterday morning, anuric today  - GFR is zero and anuric, worsening hypervolemia. Discussed with SICU team as well as Dr. Lowe. Do not expect renal recover in next 24-48 hours given that UOP has steadily declined over last few days. Recommend starting dialysis (consent already obtained). Given risk for ischemic bowel and hypotension/hypoperfusion with current inflammatory state will do CRRT despite absence of pressor requirement. Start with UF negative 25 ml per hour.    # volume - started on furosemide 10 mg/hr infusion, no increase in UOP, worsening hypervolemia    # no acid base or potassium issue    Recommendations were communicated to primary team via discussion    Inez Stephens MD   Division of Renal Disease and Hypertension  [facility]  [employer] Web Console    Interval History :   Nursing and provider notes from last 24 hours reviewed.  In the last 24 hours Alfredo Burnham remains in ICU on mechanical ventilation, UOP worsening, received some colloid yesterday and even more fluids in days prior. Intubated and sedated in ICU.    Review of Systems:   Not obtainable due to intubated sedated status    Physical Exam:   I/O last 3 completed shifts:  In: 3107.95 [I.V.:1296.45; NG/GT:90]  Out: 2438 [Urine:138;  "Emesis/NG output:300; Drains:2000]   BP (!) 165/72   Pulse (!) 133   Temp 99.5  F (37.5  C)   Resp 24   Ht 1.727 m (5' 8\")   Wt 96.3 kg (212 lb 4.9 oz)   SpO2 92%   BMI 32.28 kg/m       GENERAL APPEARANCE: intubated, sedated  PULM: lungs clear anteriorly to auscultation bilaterally, equal air movement, no clubbing  CV: regular rhythm, normal rate, no rub     -edema trace   INTEGUMENT: no cyanosis, no rash  NEURO:  sedated    Labs:   All labs reviewed by me  Electrolytes/Renal -   Recent Labs   Lab Test 09/30/23  0906 09/30/23  0648 09/30/23 0403 09/30/23 0050 09/30/23 0048 09/29/23 1938   NA  --   --  140  --  140 140   POTASSIUM  --   --  3.8  --  3.8 3.8   CHLORIDE  --   --  108*  --  108* 108*   CO2  --   --  17*  --  17* 18*   BUN  --   --  74.6*  --  71.7* 67.0*   CR  --  6.33* 6.09*  --  6.11* 5.96*   *  --  143*  142*   < > 136* 122*   OMAR  --   --  7.9*  --  7.9* 7.8*   MAG  --   --  1.9  --  2.0 2.0   PHOS  --   --  2.2*  --  2.3* 2.5    < > = values in this interval not displayed.       CBC -   Recent Labs   Lab Test 09/30/23 0403 09/30/23 0048 09/29/23 1938   WBC 7.4 7.5 5.5   HGB 8.2* 8.5* 7.9*   PLT 74* 75* 69*       LFTs -   Recent Labs   Lab Test 09/30/23 0403 09/30/23 0048 09/29/23 1938   ALKPHOS 75 76 64   BILITOTAL 1.4* 1.5* 1.4*   ALT 37 36 37   * 135* 136*   PROTTOTAL 4.1* 4.2* 4.1*   ALBUMIN 2.2* 2.5* 2.4*       Iron Panel - No lab results found.      Imaging:  Reviewed CXR today    Current Medications:   B and C vitamin Complex with folic acid  10 mL Per Feeding Tube Daily    chlorhexidine  15 mL Mouth/Throat Q12H    heparin ANTICOAGULANT  5,000 Units Subcutaneous Q8H    insulin aspart  1-6 Units Subcutaneous Q4H    pantoprazole  40 mg Per Feeding Tube QAM AC    Or    pantoprazole  40 mg Intravenous QAM AC    [Held by provider] polyethylene glycol  17 g Oral or Feeding Tube Daily    [Held by provider] senna-docusate  1 tablet Oral or Feeding Tube BID    sodium " chloride (PF)  3 mL Intracatheter Q8H      dextrose      dextrose      dextrose      fentaNYL 125 mcg/hr (09/30/23 0900)    furosemide (LASIX) 100 mg in sodium chloride 0.9 % 100 mL infusion      norepinephrine Stopped (09/29/23 1705)    parenteral nutrition - ADULT compounded formula 45 mL/hr at 09/29/23 2300    propofol 25 mcg/kg/min (09/30/23 0900)     Inez Stephens MD

## 2023-09-30 NOTE — PROCEDURES
Name of Procedure:  Right Femoral Dialysis Catheter Placement     Date of Procedure: 9/30/2023     Description of Procedure  Consent was present in the chart, obtained 9/29. A time out was performed.  The site was then prepped and draped in the usual sterile fashion.     Under direct ultrasound guidance, an 18-gauge needle, attached to a 5 mL syringe, was then penetrated at the marking site and advanced through the patient's skin and subcutaneous tissue while being directed medially. The syringe was continuously aspirated as the needle was advanced until entry into the vein was marked by a flash of blood. Using the Seldinger technique, a guidewire was advanced throughout the needle. The guidewire was then secured with a fingertip at the skin as the needle and syringe were removed over it. Using a scalpel, a small incision was then made in the skin along the guidewire at a point just adjacent to the entry site. Two silastic dilators were then passed over the guidewire and advanced  through the subcutaneous tissue serially. The last dilator was subsequently removed and a double-lumen catheter was threaded over the guidewire until the 20 cm juana reached the entry site on the skin. The guidewire was then removed.     At this point, both ports of the dialysis catheter were drawing well and flushing easily with sterile saline. A Biopatch was placed at the skin entry site and the double-lumen catheter was secured in place using a 2-0 silk suture, after which a sterile tegaderm dressing was applied. There were not any immediate complications.           Number of attempts: 1  EBL: < 5 ml      Casi De La Mater

## 2023-09-30 NOTE — PROGRESS NOTES
SURGICAL ICU PROGRESS NOTE  09/30/2023        Date of Service (when I saw the patient): 09/30/2023     ASSESSMENT:  ASSESSMENT:  Alfredo Burnham is a 68 year old male who was admitted to the SICU on 9/24/2023 s/p open AAA repair. Patient has a history of HTN and previous TIA. He presented to the ED on 9/24 with right sided abdominal pain and was found to have a contained, ruptured AAA on CT. 30 min super-celiac clamp time. Prolonged intra-renal clamp. 9/25 s/p flex sig showing rectal ischemia, abdominal washout showing a hemostatic AAA graft and no evidence of transmural colonic or small bowel ischemia, and an unsuccessful LLE embolectomy. 9/26 s/p repeat abdominal washout, retroperitoneal closure, LLE wound washout/closure. 9/29 s/p repeat abd washout, bowel appeared well perfused w/o notable ischemia.        CHANGES and MAJOR THINGS TODAY:   - EKG  - Bedside POCUS  - Diuretic challenge  - Sedation vacation  - PST  - Place temporary dialysis catheter  - Start CRRT  - Stop empiric antibiotics  - Start subcutaneous heparin  - Stop platelet transfusions    PLAN:     Neurological:  # Acute pain   # Mixed metabolic encephalopathy   - Monitor neurological status. Delirium preventions and precautions  - Sedation plan: propofol infusion  - RASS goal 0 to -1  - Pain: fentanyl gtt  - Sedation vacation this morning, terminated in the setting of tachycardia, HTN, tachypnea.   - Plan to repeat in am.      Pulmonary:  # Acute hypoxic respiratory failure  - VENT: /16/8/50  - PST daily.   - Hold on extubation given encephalopathy, need for airway protection   Ventilatory bundle. HOB elevation   - CXR: small bilateral pleural effusions. Mild pulmonary edema.   - Bronch 9/28 for L opacification of the isidro-field. Follow up CXR demonstrates significant improvement. Will continue to follow culture results     Cardiovascular:    # Contained AAA rupture s/p open repair  # Suspected emboli to LLE  #Hx of HTN  #Sinus  tachycardia  #Ischemic LLE  Concern for pulmonary embolus by primary team noted in the setting of sinus tachycardia.   - 12 lead EKG without right heart strain  - My personal bedside ECHO: TAPSE 1.8 cm. RVSP 14, normal chamber sizes, no RV dilation. Cannot estimate RAP given inability to visualize IVC given abthera device. Normal LV function. No obvious valvulopathies.   - Venous US bilateral lower extremities positive for occlusive thrombus in the left posterior tibial vein extending to mid popliteal vein.   - Overall low suspicion for PE at this time.   - Monitor hemodynamic status.   - Goal MAP >65, ideally SBP goal 100-140. No vasopressors required.   - Ischemic LLL- no pulse signals. Intra op evaluation 9/29 with viable muscle. Noted DVT expected in the setting of ischemia and absent inflow.      GI/Nutrition:    # Protein calorie deficit malnutrition  #s/p open AAA repair, abthera device in place  # Post-operative melena  # Rectal ischemia  - NPO  - PPI for ppx  - NPO  - TPN. Continue trophic feeds  - Abthera device in place.  - Tentative RTOR 10/2 for washout. General surgery consult next week for assistance in abdominal closure.      Renal:   # MAYA  - Total volume up with third spacing.   - No robust response to 120 mg Furosemide 9/29 with 125 ml urine/24 hours. Repeated today with no substantial response. Weight up 25 kg since admission.   - While electrolytes remained balanced and there are no emergent indications for RRT, given his total volume overload, persistent oliguria and diuretic resistance, he'd benefit from RRT for volume management with intent of future abdominal closure and ventilator liberation.   - Discussed with Dr. Stephens and vascular staff.   - Will place temporary dialysis catheter and start CRRT     Endocrine:  # Stress hyperglycemia  - Q6 BG checks, medium sliding scale available.      Infectious disease:   - No leukocytosis, afebrile.   - Empirically treated with Metronidazole and  Cefepime given prior concerns of intestinal ischemia. 9/29 intra op report reviewed, healthy appearing SB, colon without evidence of ischemia.   - Stop antimicrobials today.       Hematology:    # Acute blood loss anemia  # Thrombocytopenia, multifactorial  #LLE DVT  Admission platelet count 94K, 74 K this am.   - 4T score: 3 points (0/1/2/0). Low probability for HIT. Thrombocytopenia likely multifactorial given acute blood loss, critical illness  - Start subcutaneous Heparin for prophylaxis  - Stop conditional platelet transfusion order for platelets <100K given no evidence of ongoing bleeding.   - Hemoglobin stable 8.2   - LLE DVT noted, again expected given extremity ischemia and absence of inflow. No indication to treat with therapeutic anticoagulation.       Musculoskeletal:  # Weakness and deconditioning of critical illness  # Left lower limb ischemia   - Physical and occupational therapy consult when appropriate. Passive ROM at bedside with RN  - Flat bedrest, do not break bed     General Cares/Prophylaxis:    DVT Prophylaxis: Pneumatic Compression Devices; subcutaneous heparin   GI Prophylaxis: PPI  Restraints: Restraints for medical healing needed: YES     Lines/ tubes/ drains:  - ETT  - Lu  - Abthera  - PIV x3  - RIJ MAC  - R radial arterial line  - R FV dialysis catheter     Disposition:  - Surgical ICU      Patient seen, findings and plan discussed with surgical ICU staff, Dr. Escobedo.  Critical care care exclusive of procedures 60 minutes    [facility] De [facility]       ====================================  INTERVAL HISTORY:   Sinus tachycardia. Intubated, sedated, Unable to obtain ROS.      ROS unable to be performed due to sedation and intubation.      OBJECTIVE:   1. VITAL SIGNS:   Temp:  [98.2  F (36.8  C)-100.2  F (37.9  C)] 99.5  F (37.5  C)  Pulse:  [] 108  Resp:  [0-19] 18  BP: (112-136)/(54-79) 123/58  MAP:  [41 mmHg-175 mmHg] 81 mmHg  Arterial Line BP: ()/(47-75) 129/59  FiO2 (%):   [40 %-50 %] 50 %  SpO2:  [92 %-100 %] 98 %  Vent Mode: CMV/AC  (Continuous Mandatory Ventilation/ Assist Control)  FiO2 (%): 50 %  Resp Rate (Set): 16 breaths/min  Tidal Volume (Set, mL): 450 mL  PEEP (cm H2O): 8 cmH2O  Resp: 18        2. INTAKE/ OUTPUT:   I/O last 3 completed shifts:  In: 4232.15 [I.V.:2267.65; NG/GT:30]  Out: 3125 [Urine:125; Emesis/NG output:700; Drains:2300]     3. PHYSICAL EXAMINATION:  General: Intubated, sedated  HEENT: Normocephalic, atraumatic. NGT to LIS, ETT  Neuro: Pupils equal and reactive, withdraws upper extremities to noxious stimulus.   Pulm/Resp: coarse lung sounds throughout, no wheezing. PIPs mid teens.   CV: RRR, sinus tachycardia on monitor.   Abdomen: Open abdomen with Abthera in place, serosanguinous output, soft, moderately distended  :  aparicio catheter in place, no urine seen.   MSK/Extremities: RLE warm to palpation with strong signals. LLE cool to touch, dusky, no signals.      4. INVESTIGATIONS:   Arterial Blood Gases          Recent Labs   Lab 09/30/23  0404 09/30/23 0048 09/29/23  1651 09/29/23  1211   PH 7.35 7.35 7.34* 7.30*   PCO2 35 35 36 39   PO2 72* 65* 150* 79*   HCO3 19* 19* 19* 20*      Complete Blood Count          Recent Labs   Lab 09/30/23  0403 09/30/23  0048 09/29/23 1938 09/29/23  1651   WBC 7.4 7.5 5.5 6.0   HGB 8.2* 8.5* 7.9* 8.4*   PLT 74* 75* 69* 89*      Basic Metabolic Panel           Recent Labs   Lab 09/30/23  0403 09/30/23  0050 09/30/23  0048 09/29/23 1938 09/29/23  1655 09/29/23  1651     --  140 140  --  140   POTASSIUM 3.8  --  3.8 3.8  --  3.9   CHLORIDE 108*  --  108* 108*  --  110*   CO2 17*  --  17* 18*  --  17*   BUN 74.6*  --  71.7* 67.0*  --  63.5*   CR 6.09*  --  6.11* 5.96*  --  5.79*   *  142* 123* 136* 122*   < > 120*    < > = values in this interval not displayed.      Liver Function Tests         Recent Labs   Lab 09/30/23  0403 09/30/23  0048 09/29/23  1938 09/29/23  1651   * 135* 136* 159*   ALT 37 36  37 46   ALKPHOS 75 76 64 69   BILITOTAL 1.4* 1.5* 1.4* 1.4*   ALBUMIN 2.2* 2.5* 2.4* 2.2*   INR 1.35* 1.31* 1.37* 1.34*      Pancreatic Enzymes      Recent Labs   Lab 09/24/23  1644   LIPASE 21      Coagulation Profile         Recent Labs   Lab 09/30/23  0403 09/30/23  0048 09/29/23  1938 09/29/23  1651   INR 1.35* 1.31* 1.37* 1.34*   PTT 37 38 37 34            5. RADIOLOGY:       Recent Results (from the past 24 hour(s))   XR Chest Port 1 View     Impression     RESIDENT PRELIMINARY INTERPRETATION  IMPRESSION:  1. Stable small bilateral pleural effusions.  2. Stable hazy opacities in the mid to lower lung fields bilaterally  likely representing pulmonary edema.  3. Support devices are in stable position.         =========================================

## 2023-09-30 NOTE — PROGRESS NOTES
I visited Mr. Mcgraw this morning with my senior vascular surgery resident Dr. Donald.  We discussed with nursing staff, SICU SANDY, and examined the patient with Dr. Donald.  Overall he is roughly stable from yesterday although by some accounts improved.  He has been off vasopressor agents and maintaining his blood pressures adequately.  He is slightly tachycardic today with unclear etiology, although this may be related to decreasing sedation.  We will have to continue to monitor.  Although he is low risk for PE right now it certainly could be on the differential.  We will perform basic evaluation, however will hold off on CT angiogram given the low risk and is continued struggles with renal function.  His platelets are again somewhat low this morning although I do not think he needs to actively be transfused.  It does raise the question of HIT although again his 4 T score would argue against.  Of note his creatinine has had stable values overnight at approximately 6.1.  His urine output remains quite low although again with some slight improvement overnight from yesterday.  There is again continued concerned that he will end up on CRRT although he does not have immediate indication.  His respiratory status is okay and he is maintaining his acid-base balance while clearing electrolytes.  I would like to hold off on CRRT again today in favor of a more aggressive Lasix challenge.  Dr. Escobedo and I discussed this patient at length as well and have come to consensus on her management strategy for the day.  In summary  - SBP less than 160, okay with the use of IV medications or drips per the discretion and expertise of the ICU  -Lasix challenge  -We will hold off in any contrast based imaging given the low suspicion for PE but will obtain echo.  -We will hold off any empiric anticoagulation for HIT screen given low probabilities  -I have asked nursing to pass along to the family when they arrive that Dr. Escobedo I  have seen and discussed the patient extensively through the course of the morning and I remain available should he have any issues.    Appreciate the excellent multidisciplinary care of Mr. Burnham who remains critically ill.

## 2023-09-30 NOTE — PROVIDER NOTIFICATION
D. Propofol- fentanyl on hold for sedation holiday- within 20 min bp increased to 190 syst- hr increased to 140, hydralazine given IV- bp cont to rise to 200 syst-   A propofol and fentanyl re started- MD notified-  P with next sedation holiday prns available. ( Ordered)

## 2023-09-30 NOTE — PROGRESS NOTES
CRRT INITIATION NOTE    Consent for CRRT Completed:  YES  Patient s Vascular Access: Catheter              Placement Confirmed: YES  Manufacture:  [employer] Smart  Model:  Henrik      DATA:  Procedure:  CVVHDF  Start Time:  1353  Machine#:  [id number]  Filter:    Blood Flow:  180  ML/min  Pre-Replacement Solution:  Phox 4/2.5  Post-Replacement Solution:  Phox 4/2.5  Dialysate Solution:  Phox 4/2.5  Pre-Replacement Solution Rate:  1200 mL/hr  Post-Replacement Solution Rate:  200 mL/hr  Dialysate Flow Rate:  1200 mL/hr   Patient Removal Rate:  0 mL/hr  Anticoagulation Type and Rate:  NA    ASSESSMENT:  How Patient Tolerated Initiation:   Vital Signs:  Temp: 99.9  F (37.7  C) Temp src: Esophageal BP: (!) 165/72 Pulse: 115   Resp: 14 SpO2: 98 % O2 Device: Mechanical Ventilator      Initial Pressures:  Access:  -45  Filter:  140  Return:  67  TMP:  38  Change in Filter Pressure:  34      INTERVENTIONS:  Initiated CVVHDF    PLAN:  Continue with treatment goals. Call CRRT RN at [phone] with questions and concerns.

## 2023-09-30 NOTE — PROGRESS NOTES
Vascular Surgery Progress Note  09/30/2023       Subjective:  Newly tachycardic this am to the 110s, however remains off presors  Continues to have minimal UOP with 5-10cc/hr, and creatinine continues to rise. Potassium remains appropriate at 3.8  Patient appears to be edematous, however his vent requirements, although small increase in FiO2(which  are not such that we are concerned he is fluid overloaded in a way that would require CRRT at this time.      Objective:  Temp:  [98.2  F (36.8  C)-100.2  F (37.9  C)] 99.5  F (37.5  C)  Pulse:  [] 108  Resp:  [0-19] 18  BP: (112-165)/(54-79) 165/72  MAP:  [41 mmHg-175 mmHg] 81 mmHg  Arterial Line BP: ()/(47-75) 129/59  FiO2 (%):  [40 %-50 %] 50 %  SpO2:  [92 %-100 %] 98 %    I/O last 3 completed shifts:  In: 3107.95 [I.V.:1296.45; NG/GT:90]  Out: 2438 [Urine:138; Emesis/NG output:300; Drains:2000]      Gen: intubated, sedated, opens eyes to command, does not withdraw UE to pain for me today.  CV: RR per radial pulse, off pressors, sinus tach per tele  Resp: intubated, on ventilator  Abd: wound vac in place, holding seal, abdomen soft to palpation around VAC.    Ext: RLE wwp, mulitphasic DP and PT signal. LLE cool to touch,dusky, some erythema appreciated on dorsal aspect of foot. no DP/PT signals. Compartments soft, compressible.     UOP: approximately 10ml/hr   Temp abd closure output: ~ 100-150ml/hr       Labs:  Recent Labs   Lab 09/30/23  0403 09/30/23  0048 09/29/23 1938   WBC 7.4 7.5 5.5   HGB 8.2* 8.5* 7.9*   PLT 74* 75* 69*         Recent Labs   Lab 09/30/23  0648 09/30/23  0403 09/30/23  0050 09/30/23  0048 09/29/23 1938   NA  --  140  --  140 140   POTASSIUM  --  3.8  --  3.8 3.8   CHLORIDE  --  108*  --  108* 108*   CO2  --  17*  --  17* 18*   BUN  --  74.6*  --  71.7* 67.0*   CR 6.33* 6.09*  --  6.11* 5.96*   GLC  --  143*  142* 123* 136* 122*   OMAR  --  7.9*  --  7.9* 7.8*   MAG  --  1.9  --  2.0 2.0   PHOS  --  2.2*  --  2.3* 2.5          Imaging:  EXAM: XR CHEST PORT 1 VIEW  9/30/2023 6:11 AM      HISTORY:  follow pleural effusions s/p AAA repair        COMPARISON:  9/29/2023     TECHNIQUE: Portable AP semiupright view of the chest     FINDINGS:   Endotracheal tube tip, right IJ CVC, and enteric tube are in stable  position.     The trachea is midline. The cardiomediastinal silhouette is stable. No  pneumothorax. Stable small bilateral pleural effusions. Similar hazy  opacities in the mid to lower lung fields bilaterally, left greater  than right. No focal consolidation.                                                                      IMPRESSION:  1. Stable small bilateral pleural effusions.  2. Stable hazy opacities in the mid to lower lung fields bilaterally  likely representing pulmonary edema in combination with some layering  of the effusions.  3. Support devices are in stable position.     I have personally reviewed the examination and initial interpretation  and I agree with the findings.     FILOMENA WAHL MD      Narrative & Impression   EXAMINATION: DOPPLER VENOUS ULTRASOUND OF BILATERAL LOWER EXTREMITIES,  9/30/2023 8:49 AM      COMPARISON: None.     HISTORY: Swelling, rule out DVT     TECHNIQUE:  Gray-scale evaluation with compression, spectral flow and  color Doppler assessment of the deep venous system of both legs from  groin to knee, and then at the ankles.     FINDINGS:  In right lower extremity, the common femoral, femoral, popliteal and  posterior tibial veins demonstrate normal compressibility and blood  flow.     In left lower extremity, the common femoral and femoral veins  demonstrate normal compressibility and blood flow.  Nonocclusive thrombus in the left proximal popliteal vein. Occlusive  thrombus in the left mid to distal popliteal vein and posterior tibial  vein. Left calf vessels are not well followed due to fasciotomy.                                                                      IMPRESSION:  1.   Occlusive thrombus in the left posterior tibial vein extending to  the mid popliteal vein which becomes nonocclusive in the proximal  popliteal vein and visualized posterior tibial vein.  2.  No evidence of deep venous embolus is in the right lower  extremity.  3.  Left calf vessels not well followed due to fasciotomy.     Finding was identified on 9/30/2023 8:52 AM.      Dr. Harper was contacted by Dr. Jose at 9/30/2023 8:55 AM and  verbalized understanding of the urgent finding.      I have personally reviewed the examination and initial interpretation  and I agree with the findings.     FILOMENA WAHL MD            Assessment/Plan:   68 year old male with PMH of HTN and previous TIA who presented with R sided abdominal pain found to have contained ruptured AAA, now s/p open AAA repair 9/24 into 9/25am with 30 min supraceliac clamp time, prolonged infrarenal clamp time, temporary abdominal closure. He remains critically ill in the ICU. He did have bloody stool postop with flex sig 9/25 demonstrating ischemic mucosal changes of the rectum, however on repeated abdominal looks he has had no evidence of transmural necrosis of the small or large intestine, or the rectum. Hemodynamically he appears to be significantly improved today and is no longer requiring pressors, appears to be appropriately volume resuscitated at this time. He has ongoing elevated creatinine and low UOP, which we are watching closely. We will hold off on CRRT at this time as he is not acidotic, his electrolytes are wnl, he is not fluid overloaded in the sense that he is unable to oxygenate or ventilate on the ventilator. We will plan for aggressive lasix trial today.  Ongoing absent signals with ischemic LLE, note DVT in this leg, this is to be expected given ischemia and absent inflow. We will continue to monitor LLE given it is not currently making patient systemically ill and there is no indication for urgent intervention. We will  continue to follow him closely today.       - Will plan for RTOR Monday 10/2 for washout abdomen.   - Pain/Sedation per ICU, appreciate sedation vacation to assess neuro status  - Wean vent as able, continue vent support  - PE is a possibility given new tachycardia, however low suspicion, will continue to monitor vent requirements, resp status.  - Please do not scan for PE at this time unless patient has worsening resp status, or discuss with vascular surgery team.   - ECHO to evaluate for PE  - EKG today for tachycardia  - Goal SBP <160, MAP >65  - Appreciate flotract monitoring  - Continue trickle feeds at this time.  - Plan for general surgery consult next week for consideration and assistance with abdominal closure, anticipate patient may need mesh closure. Discussed with general surgery team and they are aware consult will be placed next week.   - Appreciate nephrology consult and recommendations  - Aggressive lasix trial today  - Monitor renal function and electrolytes, serum creatinine at 6.33. Please do not initiate CRRT at this time, only at the discretion of vascular surgery attending, Dr. Lowe  - Continue aparicio  - Broad spectrum abx to cover for gram negatives, please continue in setting of new tachycardia  - HIT screen today because:  Plts continue to drift down despite plt transfusions, 74 today, primitivo of 69 yesterday, previously had primitivo 9/28 of 66 after being 90s-100s, with appropriate increase back to  after 2u plts and then drop again 9/29 back to 69. We will plan for HIT screen given this, although likelihood low. 4T score of 3, with onset 4 days after exposure, but with continuing drop 5 days postop, with no new identifiable thrombosis that we're aware of, <50% drop in plts, and possible although not definite other causes. That said, PE is on the differential for tachycardia and while we have low suspicion for this it certainly would be a thrombosis.  - We note the DVT in the LLE,  which is ischemic and we expect there to be  thrombosis from stasis in the tibial and popliteal veins as this is the level the patient is ischemic. No indication to anticoagulate for this.       Seen and Discussed with vascular surgery staff, Dr. Kandace Mcleod who is in agreement with the above.    Will Donald MD   Vascualr Surgery PGY3

## 2023-10-01 ENCOUNTER — APPOINTMENT (OUTPATIENT)
Dept: GENERAL RADIOLOGY | Facility: CLINIC | Age: 68
DRG: 268 | End: 2023-10-01
Payer: COMMERCIAL

## 2023-10-01 ENCOUNTER — APPOINTMENT (OUTPATIENT)
Dept: GENERAL RADIOLOGY | Facility: CLINIC | Age: 68
DRG: 268 | End: 2023-10-01
Attending: SURGERY
Payer: COMMERCIAL

## 2023-10-01 ENCOUNTER — ANESTHESIA EVENT (OUTPATIENT)
Dept: SURGERY | Facility: CLINIC | Age: 68
DRG: 268 | End: 2023-10-01
Payer: COMMERCIAL

## 2023-10-01 PROBLEM — N17.0 ACUTE KIDNEY FAILURE WITH TUBULAR NECROSIS (H): Status: ACTIVE | Noted: 2023-10-01

## 2023-10-01 LAB
ALBUMIN SERPL BCG-MCNC: 2 G/DL (ref 3.5–5.2)
ALBUMIN SERPL BCG-MCNC: 2 G/DL (ref 3.5–5.2)
ALBUMIN SERPL BCG-MCNC: 2.5 G/DL (ref 3.5–5.2)
ALBUMIN SERPL BCG-MCNC: 2.5 G/DL (ref 3.5–5.2)
ALBUMIN UR-MCNC: 300 MG/DL
ALLEN'S TEST: ABNORMAL
ALP SERPL-CCNC: 103 U/L (ref 40–129)
ALT SERPL W P-5'-P-CCNC: 26 U/L (ref 0–70)
ANION GAP SERPL CALCULATED.3IONS-SCNC: 13 MMOL/L (ref 7–15)
ANION GAP SERPL CALCULATED.3IONS-SCNC: 14 MMOL/L (ref 7–15)
APPEARANCE UR: ABNORMAL
AST SERPL W P-5'-P-CCNC: 88 U/L (ref 0–45)
BASE EXCESS BLDA CALC-SCNC: -2.1 MMOL/L (ref -9–1.8)
BILIRUB DIRECT SERPL-MCNC: 1.45 MG/DL (ref 0–0.3)
BILIRUB SERPL-MCNC: 1.6 MG/DL
BILIRUB UR QL STRIP: NEGATIVE
BUN SERPL-MCNC: 52.9 MG/DL (ref 8–23)
BUN SERPL-MCNC: 55.8 MG/DL (ref 8–23)
BUN SERPL-MCNC: 62.5 MG/DL (ref 8–23)
BUN SERPL-MCNC: 62.5 MG/DL (ref 8–23)
CA-I BLD-MCNC: 4.4 MG/DL (ref 4.4–5.2)
CALCIUM SERPL-MCNC: 7.5 MG/DL (ref 8.8–10.2)
CALCIUM SERPL-MCNC: 7.7 MG/DL (ref 8.8–10.2)
CALCIUM SERPL-MCNC: 8 MG/DL (ref 8.8–10.2)
CALCIUM SERPL-MCNC: 8 MG/DL (ref 8.8–10.2)
CHLORIDE SERPL-SCNC: 107 MMOL/L (ref 98–107)
CHLORIDE SERPL-SCNC: 107 MMOL/L (ref 98–107)
CHLORIDE SERPL-SCNC: 108 MMOL/L (ref 98–107)
CHLORIDE SERPL-SCNC: 108 MMOL/L (ref 98–107)
COLOR UR AUTO: YELLOW
CREAT SERPL-MCNC: 2.93 MG/DL (ref 0.67–1.17)
CREAT SERPL-MCNC: 3.32 MG/DL (ref 0.67–1.17)
CREAT SERPL-MCNC: 4.18 MG/DL (ref 0.67–1.17)
CREAT SERPL-MCNC: 4.18 MG/DL (ref 0.67–1.17)
DEPRECATED HCO3 PLAS-SCNC: 19 MMOL/L (ref 22–29)
EGFRCR SERPLBLD CKD-EPI 2021: 15 ML/MIN/1.73M2
EGFRCR SERPLBLD CKD-EPI 2021: 15 ML/MIN/1.73M2
EGFRCR SERPLBLD CKD-EPI 2021: 19 ML/MIN/1.73M2
EGFRCR SERPLBLD CKD-EPI 2021: 22 ML/MIN/1.73M2
ERYTHROCYTE [DISTWIDTH] IN BLOOD BY AUTOMATED COUNT: 17.8 % (ref 10–15)
ERYTHROCYTE [DISTWIDTH] IN BLOOD BY AUTOMATED COUNT: 17.8 % (ref 10–15)
ERYTHROCYTE [DISTWIDTH] IN BLOOD BY AUTOMATED COUNT: 17.9 % (ref 10–15)
FIBRINOGEN PPP-MCNC: 624 MG/DL (ref 170–490)
FRAGMENTS BLD QL SMEAR: SLIGHT
GLUCOSE BLDC GLUCOMTR-MCNC: 128 MG/DL (ref 70–99)
GLUCOSE BLDC GLUCOMTR-MCNC: 129 MG/DL (ref 70–99)
GLUCOSE BLDC GLUCOMTR-MCNC: 141 MG/DL (ref 70–99)
GLUCOSE BLDC GLUCOMTR-MCNC: 144 MG/DL (ref 70–99)
GLUCOSE BLDC GLUCOMTR-MCNC: 149 MG/DL (ref 70–99)
GLUCOSE BLDC GLUCOMTR-MCNC: 158 MG/DL (ref 70–99)
GLUCOSE SERPL-MCNC: 146 MG/DL (ref 70–99)
GLUCOSE SERPL-MCNC: 149 MG/DL (ref 70–99)
GLUCOSE SERPL-MCNC: 149 MG/DL (ref 70–99)
GLUCOSE SERPL-MCNC: 170 MG/DL (ref 70–99)
GLUCOSE UR STRIP-MCNC: 30 MG/DL
HCO3 BLD-SCNC: 22 MMOL/L (ref 21–28)
HCT VFR BLD AUTO: 23 % (ref 40–53)
HCT VFR BLD AUTO: 23.1 % (ref 40–53)
HCT VFR BLD AUTO: 24 % (ref 40–53)
HGB BLD-MCNC: 7.7 G/DL (ref 13.3–17.7)
HGB BLD-MCNC: 7.7 G/DL (ref 13.3–17.7)
HGB BLD-MCNC: 8 G/DL (ref 13.3–17.7)
HGB UR QL STRIP: ABNORMAL
HYALINE CASTS: 4 /LPF
INR PPP: 1.28 (ref 0.85–1.15)
KETONES UR STRIP-MCNC: NEGATIVE MG/DL
LACTATE SERPL-SCNC: 1.2 MMOL/L (ref 0.7–2)
LACTATE SERPL-SCNC: 1.3 MMOL/L (ref 0.7–2)
LACTATE SERPL-SCNC: 1.6 MMOL/L (ref 0.7–2)
LACTATE SERPL-SCNC: 1.9 MMOL/L (ref 0.7–2)
LEUKOCYTE ESTERASE UR QL STRIP: NEGATIVE
MAGNESIUM SERPL-MCNC: 2.1 MG/DL (ref 1.7–2.3)
MAGNESIUM SERPL-MCNC: 2.2 MG/DL (ref 1.7–2.3)
MAGNESIUM SERPL-MCNC: 2.2 MG/DL (ref 1.7–2.3)
MCH RBC QN AUTO: 32.6 PG (ref 26.5–33)
MCH RBC QN AUTO: 32.8 PG (ref 26.5–33)
MCH RBC QN AUTO: 32.8 PG (ref 26.5–33)
MCHC RBC AUTO-ENTMCNC: 33.3 G/DL (ref 31.5–36.5)
MCHC RBC AUTO-ENTMCNC: 33.3 G/DL (ref 31.5–36.5)
MCHC RBC AUTO-ENTMCNC: 33.5 G/DL (ref 31.5–36.5)
MCV RBC AUTO: 98 FL (ref 78–100)
MRSA DNA SPEC QL NAA+PROBE: NEGATIVE
MUCOUS THREADS #/AREA URNS LPF: PRESENT /LPF
NITRATE UR QL: NEGATIVE
O2/TOTAL GAS SETTING VFR VENT: 40 %
OXYHGB MFR BLD: 98 % (ref 92–100)
PCO2 BLD: 34 MM HG (ref 35–45)
PH BLD: 7.42 [PH] (ref 7.35–7.45)
PH UR STRIP: 6 [PH] (ref 5–7)
PHOSPHATE SERPL-MCNC: 3.1 MG/DL (ref 2.5–4.5)
PHOSPHATE SERPL-MCNC: 3.3 MG/DL (ref 2.5–4.5)
PHOSPHATE SERPL-MCNC: 3.4 MG/DL (ref 2.5–4.5)
PHOSPHATE SERPL-MCNC: 3.5 MG/DL (ref 2.5–4.5)
PLAT MORPH BLD: ABNORMAL
PLATELET # BLD AUTO: 41 10E3/UL (ref 150–450)
PLATELET # BLD AUTO: 45 10E3/UL (ref 150–450)
PLATELET # BLD AUTO: 59 10E3/UL (ref 150–450)
PO2 BLD: 110 MM HG (ref 80–105)
POLYCHROMASIA BLD QL SMEAR: SLIGHT
POTASSIUM SERPL-SCNC: 4 MMOL/L (ref 3.4–5.3)
POTASSIUM SERPL-SCNC: 4.1 MMOL/L (ref 3.4–5.3)
PROT SERPL-MCNC: 4.1 G/DL (ref 6.4–8.3)
RBC # BLD AUTO: 2.35 10E6/UL (ref 4.4–5.9)
RBC # BLD AUTO: 2.36 10E6/UL (ref 4.4–5.9)
RBC # BLD AUTO: 2.44 10E6/UL (ref 4.4–5.9)
RBC MORPH BLD: ABNORMAL
RBC URINE: 15 /HPF
SA TARGET DNA: NEGATIVE
SODIUM SERPL-SCNC: 139 MMOL/L (ref 135–145)
SODIUM SERPL-SCNC: 140 MMOL/L (ref 135–145)
SP GR UR STRIP: 1.01 (ref 1–1.03)
SQUAMOUS EPITHELIAL: <1 /HPF
TRANSITIONAL EPI: <1 /HPF
UROBILINOGEN UR STRIP-MCNC: NORMAL MG/DL
WBC # BLD AUTO: 7.8 10E3/UL (ref 4–11)
WBC # BLD AUTO: 9 10E3/UL (ref 4–11)
WBC # BLD AUTO: 9.4 10E3/UL (ref 4–11)
WBC URINE: 7 /HPF

## 2023-10-01 PROCEDURE — 999N000253 HC STATISTIC WEANING TRIALS

## 2023-10-01 PROCEDURE — 71045 X-RAY EXAM CHEST 1 VIEW: CPT | Mod: 26 | Performed by: RADIOLOGY

## 2023-10-01 PROCEDURE — 82805 BLOOD GASES W/O2 SATURATION: CPT | Performed by: PHARMACIST

## 2023-10-01 PROCEDURE — 81001 URINALYSIS AUTO W/SCOPE: CPT | Performed by: NURSE PRACTITIONER

## 2023-10-01 PROCEDURE — 85027 COMPLETE CBC AUTOMATED: CPT | Performed by: INTERNAL MEDICINE

## 2023-10-01 PROCEDURE — 84100 ASSAY OF PHOSPHORUS: CPT | Performed by: SURGERY

## 2023-10-01 PROCEDURE — 80069 RENAL FUNCTION PANEL: CPT | Performed by: SURGERY

## 2023-10-01 PROCEDURE — 250N000009 HC RX 250: Performed by: INTERNAL MEDICINE

## 2023-10-01 PROCEDURE — 87205 SMEAR GRAM STAIN: CPT

## 2023-10-01 PROCEDURE — 71045 X-RAY EXAM CHEST 1 VIEW: CPT

## 2023-10-01 PROCEDURE — 82248 BILIRUBIN DIRECT: CPT | Performed by: INTERNAL MEDICINE

## 2023-10-01 PROCEDURE — 250N000009 HC RX 250: Performed by: SURGERY

## 2023-10-01 PROCEDURE — B4185 PARENTERAL SOL 10 GM LIPIDS: HCPCS | Mod: JZ | Performed by: SURGERY

## 2023-10-01 PROCEDURE — 87040 BLOOD CULTURE FOR BACTERIA: CPT | Performed by: STUDENT IN AN ORGANIZED HEALTH CARE EDUCATION/TRAINING PROGRAM

## 2023-10-01 PROCEDURE — 250N000009 HC RX 250: Performed by: NURSE PRACTITIONER

## 2023-10-01 PROCEDURE — P9047 ALBUMIN (HUMAN), 25%, 50ML: HCPCS | Performed by: NURSE PRACTITIONER

## 2023-10-01 PROCEDURE — 87641 MR-STAPH DNA AMP PROBE: CPT

## 2023-10-01 PROCEDURE — 71045 X-RAY EXAM CHEST 1 VIEW: CPT | Mod: 77

## 2023-10-01 PROCEDURE — 250N000011 HC RX IP 250 OP 636

## 2023-10-01 PROCEDURE — 250N000011 HC RX IP 250 OP 636: Performed by: NURSE PRACTITIONER

## 2023-10-01 PROCEDURE — 94003 VENT MGMT INPAT SUBQ DAY: CPT

## 2023-10-01 PROCEDURE — 250N000011 HC RX IP 250 OP 636: Performed by: STUDENT IN AN ORGANIZED HEALTH CARE EDUCATION/TRAINING PROGRAM

## 2023-10-01 PROCEDURE — 83735 ASSAY OF MAGNESIUM: CPT | Performed by: INTERNAL MEDICINE

## 2023-10-01 PROCEDURE — 36415 COLL VENOUS BLD VENIPUNCTURE: CPT | Performed by: STUDENT IN AN ORGANIZED HEALTH CARE EDUCATION/TRAINING PROGRAM

## 2023-10-01 PROCEDURE — 84100 ASSAY OF PHOSPHORUS: CPT | Performed by: INTERNAL MEDICINE

## 2023-10-01 PROCEDURE — 250N000013 HC RX MED GY IP 250 OP 250 PS 637

## 2023-10-01 PROCEDURE — 82374 ASSAY BLOOD CARBON DIOXIDE: CPT | Performed by: NURSE PRACTITIONER

## 2023-10-01 PROCEDURE — 99291 CRITICAL CARE FIRST HOUR: CPT | Performed by: NURSE PRACTITIONER

## 2023-10-01 PROCEDURE — 83605 ASSAY OF LACTIC ACID: CPT | Performed by: NURSE PRACTITIONER

## 2023-10-01 PROCEDURE — 82330 ASSAY OF CALCIUM: CPT | Performed by: INTERNAL MEDICINE

## 2023-10-01 PROCEDURE — 82040 ASSAY OF SERUM ALBUMIN: CPT | Performed by: INTERNAL MEDICINE

## 2023-10-01 PROCEDURE — 250N000013 HC RX MED GY IP 250 OP 250 PS 637: Performed by: STUDENT IN AN ORGANIZED HEALTH CARE EDUCATION/TRAINING PROGRAM

## 2023-10-01 PROCEDURE — 258N000003 HC RX IP 258 OP 636: Performed by: SURGERY

## 2023-10-01 PROCEDURE — 999N000157 HC STATISTIC RCP TIME EA 10 MIN

## 2023-10-01 PROCEDURE — 85610 PROTHROMBIN TIME: CPT | Performed by: NURSE PRACTITIONER

## 2023-10-01 PROCEDURE — 250N000011 HC RX IP 250 OP 636: Performed by: SURGERY

## 2023-10-01 PROCEDURE — 200N000002 HC R&B ICU UMMC

## 2023-10-01 PROCEDURE — 90945 DIALYSIS ONE EVALUATION: CPT | Performed by: INTERNAL MEDICINE

## 2023-10-01 PROCEDURE — 85384 FIBRINOGEN ACTIVITY: CPT | Performed by: NURSE PRACTITIONER

## 2023-10-01 PROCEDURE — 250N000013 HC RX MED GY IP 250 OP 250 PS 637: Performed by: NURSE PRACTITIONER

## 2023-10-01 RX ORDER — VORICONAZOLE 40 MG/ML
300 POWDER, FOR SUSPENSION ORAL EVERY 12 HOURS SCHEDULED
Status: DISCONTINUED | OUTPATIENT
Start: 2023-10-02 | End: 2023-10-03

## 2023-10-01 RX ORDER — LABETALOL HYDROCHLORIDE 5 MG/ML
10-20 INJECTION, SOLUTION INTRAVENOUS EVERY 4 HOURS PRN
Status: DISCONTINUED | OUTPATIENT
Start: 2023-10-01 | End: 2023-11-17 | Stop reason: HOSPADM

## 2023-10-01 RX ORDER — VORICONAZOLE 40 MG/ML
400 POWDER, FOR SUSPENSION ORAL ONCE
Status: COMPLETED | OUTPATIENT
Start: 2023-10-01 | End: 2023-10-01

## 2023-10-01 RX ORDER — ALBUMIN (HUMAN) 12.5 G/50ML
12.5 SOLUTION INTRAVENOUS EVERY 6 HOURS
Status: COMPLETED | OUTPATIENT
Start: 2023-10-01 | End: 2023-10-01

## 2023-10-01 RX ORDER — METRONIDAZOLE 500 MG/100ML
500 INJECTION, SOLUTION INTRAVENOUS EVERY 12 HOURS
Status: DISCONTINUED | OUTPATIENT
Start: 2023-10-01 | End: 2023-10-03

## 2023-10-01 RX ORDER — CEFEPIME HYDROCHLORIDE 2 G/1
2 INJECTION, POWDER, FOR SOLUTION INTRAVENOUS EVERY 8 HOURS
Status: DISCONTINUED | OUTPATIENT
Start: 2023-10-01 | End: 2023-10-03

## 2023-10-01 RX ORDER — DEXMEDETOMIDINE HYDROCHLORIDE 4 UG/ML
.1-1.2 INJECTION, SOLUTION INTRAVENOUS CONTINUOUS
Status: DISCONTINUED | OUTPATIENT
Start: 2023-10-01 | End: 2023-10-06

## 2023-10-01 RX ORDER — ALBUMIN (HUMAN) 12.5 G/50ML
25 SOLUTION INTRAVENOUS ONCE
Status: COMPLETED | OUTPATIENT
Start: 2023-10-01 | End: 2023-10-01

## 2023-10-01 RX ORDER — HYDRALAZINE HYDROCHLORIDE 20 MG/ML
10-20 INJECTION INTRAMUSCULAR; INTRAVENOUS EVERY 4 HOURS PRN
Status: DISCONTINUED | OUTPATIENT
Start: 2023-10-01 | End: 2023-11-17 | Stop reason: HOSPADM

## 2023-10-01 RX ORDER — VORICONAZOLE 40 MG/ML
600 POWDER, FOR SUSPENSION ORAL EVERY 12 HOURS SCHEDULED
Status: COMPLETED | OUTPATIENT
Start: 2023-10-01 | End: 2023-10-01

## 2023-10-01 RX ADMIN — CALCIUM CHLORIDE, MAGNESIUM CHLORIDE, SODIUM CHLORIDE, SODIUM BICARBONATE, POTASSIUM CHLORIDE AND SODIUM PHOSPHATE DIBASIC DIHYDRATE 12.5 ML/KG/HR: 3.68; 3.05; 6.34; 3.09; .314; .187 INJECTION INTRAVENOUS at 22:53

## 2023-10-01 RX ADMIN — ACETAMINOPHEN 650 MG: 325 TABLET, FILM COATED ORAL at 16:16

## 2023-10-01 RX ADMIN — CHLORHEXIDINE GLUCONATE 0.12% ORAL RINSE 15 ML: 1.2 LIQUID ORAL at 20:16

## 2023-10-01 RX ADMIN — VORICONAZOLE 400 MG: 40 POWDER, FOR SUSPENSION ORAL at 11:14

## 2023-10-01 RX ADMIN — OLIVE OIL AND SOYBEAN OIL 250 ML: 16; 4 INJECTION, EMULSION INTRAVENOUS at 20:19

## 2023-10-01 RX ADMIN — INSULIN ASPART 1 UNITS: 100 INJECTION, SOLUTION INTRAVENOUS; SUBCUTANEOUS at 12:07

## 2023-10-01 RX ADMIN — DEXMEDETOMIDINE HYDROCHLORIDE 0.6 MCG/KG/HR: 400 INJECTION INTRAVENOUS at 08:48

## 2023-10-01 RX ADMIN — HEPARIN SODIUM 5000 UNITS: 5000 INJECTION, SOLUTION INTRAVENOUS; SUBCUTANEOUS at 01:57

## 2023-10-01 RX ADMIN — CALCIUM CHLORIDE, MAGNESIUM CHLORIDE, SODIUM CHLORIDE, SODIUM BICARBONATE, POTASSIUM CHLORIDE AND SODIUM PHOSPHATE DIBASIC DIHYDRATE 12.5 ML/KG/HR: 3.68; 3.05; 6.34; 3.09; .314; .187 INJECTION INTRAVENOUS at 14:42

## 2023-10-01 RX ADMIN — ALBUMIN HUMAN 12.5 G: 0.25 SOLUTION INTRAVENOUS at 21:30

## 2023-10-01 RX ADMIN — Medication 200 MCG/HR: at 18:11

## 2023-10-01 RX ADMIN — LABETALOL HYDROCHLORIDE 20 MG: 5 INJECTION, SOLUTION INTRAVENOUS at 13:32

## 2023-10-01 RX ADMIN — CALCIUM CHLORIDE, MAGNESIUM CHLORIDE, SODIUM CHLORIDE, SODIUM BICARBONATE, POTASSIUM CHLORIDE AND SODIUM PHOSPHATE DIBASIC DIHYDRATE 12.5 ML/KG/HR: 3.68; 3.05; 6.34; 3.09; .314; .187 INJECTION INTRAVENOUS at 01:57

## 2023-10-01 RX ADMIN — CALCIUM CHLORIDE, MAGNESIUM CHLORIDE, SODIUM CHLORIDE, SODIUM BICARBONATE, POTASSIUM CHLORIDE AND SODIUM PHOSPHATE DIBASIC DIHYDRATE: 3.68; 3.05; 6.34; 3.09; .314; .187 INJECTION INTRAVENOUS at 14:41

## 2023-10-01 RX ADMIN — CALCIUM CHLORIDE, MAGNESIUM CHLORIDE, SODIUM CHLORIDE, SODIUM BICARBONATE, POTASSIUM CHLORIDE AND SODIUM PHOSPHATE DIBASIC DIHYDRATE 12.5 ML/KG/HR: 3.68; 3.05; 6.34; 3.09; .314; .187 INJECTION INTRAVENOUS at 01:58

## 2023-10-01 RX ADMIN — Medication 10 ML: at 07:51

## 2023-10-01 RX ADMIN — METRONIDAZOLE 500 MG: 5 INJECTION, SOLUTION INTRAVENOUS at 17:25

## 2023-10-01 RX ADMIN — ALBUMIN HUMAN 12.5 G: 0.25 SOLUTION INTRAVENOUS at 15:26

## 2023-10-01 RX ADMIN — CHLORHEXIDINE GLUCONATE 0.12% ORAL RINSE 15 ML: 1.2 LIQUID ORAL at 07:57

## 2023-10-01 RX ADMIN — INSULIN ASPART 1 UNITS: 100 INJECTION, SOLUTION INTRAVENOUS; SUBCUTANEOUS at 03:43

## 2023-10-01 RX ADMIN — VORICONAZOLE 600 MG: 40 POWDER, FOR SUSPENSION ORAL at 20:32

## 2023-10-01 RX ADMIN — INSULIN ASPART 1 UNITS: 100 INJECTION, SOLUTION INTRAVENOUS; SUBCUTANEOUS at 16:00

## 2023-10-01 RX ADMIN — CEFEPIME HYDROCHLORIDE 2 G: 2 INJECTION, POWDER, FOR SOLUTION INTRAVENOUS at 17:26

## 2023-10-01 RX ADMIN — Medication 200 MCG/HR: at 10:27

## 2023-10-01 RX ADMIN — MAGNESIUM SULFATE HEPTAHYDRATE: 500 INJECTION, SOLUTION INTRAMUSCULAR; INTRAVENOUS at 20:22

## 2023-10-01 RX ADMIN — CALCIUM CHLORIDE, MAGNESIUM CHLORIDE, SODIUM CHLORIDE, SODIUM BICARBONATE, POTASSIUM CHLORIDE AND SODIUM PHOSPHATE DIBASIC DIHYDRATE 12.5 ML/KG/HR: 3.68; 3.05; 6.34; 3.09; .314; .187 INJECTION INTRAVENOUS at 10:39

## 2023-10-01 RX ADMIN — ALBUMIN HUMAN 25 G: 0.25 SOLUTION INTRAVENOUS at 07:50

## 2023-10-01 RX ADMIN — CALCIUM CHLORIDE, MAGNESIUM CHLORIDE, SODIUM CHLORIDE, SODIUM BICARBONATE, POTASSIUM CHLORIDE AND SODIUM PHOSPHATE DIBASIC DIHYDRATE 12.5 ML/KG/HR: 3.68; 3.05; 6.34; 3.09; .314; .187 INJECTION INTRAVENOUS at 18:54

## 2023-10-01 RX ADMIN — CALCIUM CHLORIDE, MAGNESIUM CHLORIDE, SODIUM CHLORIDE, SODIUM BICARBONATE, POTASSIUM CHLORIDE AND SODIUM PHOSPHATE DIBASIC DIHYDRATE 12.5 ML/KG/HR: 3.68; 3.05; 6.34; 3.09; .314; .187 INJECTION INTRAVENOUS at 10:38

## 2023-10-01 RX ADMIN — HEPARIN SODIUM 5000 UNITS: 5000 INJECTION, SOLUTION INTRAVENOUS; SUBCUTANEOUS at 17:26

## 2023-10-01 RX ADMIN — VANCOMYCIN HYDROCHLORIDE 1750 MG: 1 INJECTION, POWDER, LYOPHILIZED, FOR SOLUTION INTRAVENOUS at 17:20

## 2023-10-01 RX ADMIN — VORICONAZOLE 200 MG: 40 POWDER, FOR SUSPENSION ORAL at 07:50

## 2023-10-01 RX ADMIN — HEPARIN SODIUM 5000 UNITS: 5000 INJECTION, SOLUTION INTRAVENOUS; SUBCUTANEOUS at 09:12

## 2023-10-01 RX ADMIN — INSULIN ASPART 1 UNITS: 100 INJECTION, SOLUTION INTRAVENOUS; SUBCUTANEOUS at 20:17

## 2023-10-01 RX ADMIN — Medication 40 MG: at 07:50

## 2023-10-01 RX ADMIN — CALCIUM CHLORIDE, MAGNESIUM CHLORIDE, SODIUM CHLORIDE, SODIUM BICARBONATE, POTASSIUM CHLORIDE AND SODIUM PHOSPHATE DIBASIC DIHYDRATE 12.5 ML/KG/HR: 3.68; 3.05; 6.34; 3.09; .314; .187 INJECTION INTRAVENOUS at 05:50

## 2023-10-01 ASSESSMENT — ACTIVITIES OF DAILY LIVING (ADL)
ADLS_ACUITY_SCORE: 43
ADLS_ACUITY_SCORE: 43
ADLS_ACUITY_SCORE: 47
ADLS_ACUITY_SCORE: 43

## 2023-10-01 ASSESSMENT — LIFESTYLE VARIABLES: TOBACCO_USE: 1

## 2023-10-01 NOTE — PHARMACY-VANCOMYCIN DOSING SERVICE
Pharmacy Vancomycin Initial Note  Date of Service 2023  Patient's  1955  68 year old, male    Indication: Postoperative Infection    Current estimated CrCl = Estimated Creatinine Clearance: 23.2 mL/min (A) (based on SCr of 3.32 mg/dL (H)).  CRRT    Creatinine for last 3 days  2023:  8:59 PM Creatinine 5.27 mg/dL  2023: 12:10 AM Creatinine 5.16 mg/dL;  3:51 AM Creatinine 5.49 mg/dL; 10:50 AM Creatinine 5.67 mg/dL; 12:10 PM Creatinine 5.77 mg/dL;  4:51 PM Creatinine 5.79 mg/dL;  7:38 PM Creatinine 5.96 mg/dL  2023: 12:48 AM Creatinine 6.11 mg/dL;  4:03 AM Creatinine 6.09 mg/dL;  6:48 AM Creatinine 6.33 mg/dL; 10:03 AM Creatinine 6.18 mg/dL;  7:31 PM Creatinine 5.22 mg/dL  10/1/2023:  3:44 AM Creatinine 4.18 mg/dL;  3:44 AM Creatinine 4.18 mg/dL; 11:55 AM Creatinine 3.32 mg/dL    Recent Vancomycin Level(s) for last 3 days  No results found for requested labs within last 3 days.      Vancomycin IV Administrations (past 72 hours)        No vancomycin orders with administrations in past 72 hours.                    Nephrotoxins and other renal medications (From now, onward)      Start     Dose/Rate Route Frequency Ordered Stop    10/01/23 1700  vancomycin (VANCOCIN) 1,750 mg in sodium chloride 0.9 % 500 mL intermittent infusion         1,750 mg  over 120 Minutes Intravenous EVERY 24 HOURS 10/01/23 1657      23 0400  norepinephrine (LEVOPHED) 16 mg in  mL infusion MAX CONC CENTRAL LINE         0.01-0.6 mcg/kg/min × 71.7 kg (Dosing Weight)  0.7-40.3 mL/hr  Intravenous CONTINUOUS 23 0338              Contrast Orders - past 72 hours (72h ago, onward)      None                Plan:  Start vancomycin  1750 mg IV q24h.   Vancomycin monitoring method: Trough (Method 2 = manual dose calculation)  Vancomycin therapeutic monitoring goal: 15-20 mg/L  Pharmacy will check vancomycin levels as appropriate in 1-3 Days.    Serum creatinine levels will be ordered daily for the first week  of therapy and at least twice weekly for subsequent weeks.      Lucas Blake, RPH

## 2023-10-01 NOTE — PLAN OF CARE
1422-3976: Sedated on propofol@20 and fent@125. Pupils equal/reactive. Not following commands or withdrawing but (+) cough, opens eyes to pain, grimaces. ETT 26@ lip, CMV 16/450/8/40%, thick moderate secretions. LS coarse. ST 100s, PVCs. Tmax 100.0. MAP>65, SBP<160. Remains off pressors. CVP 11. Lu minimal output. NG@72 trickle feeds. BG WNL. CRRT -25/hr goal partially met this shift. Blood warmer at 36 as pt remains febrile. Abdominal wound vac serous output and LLE wound vac intact, no output. Scattered blisters, bruising, skin tears. Pt weepy-changing chux  and dressings frequently. L) radial A-line, R) fem CRRT, R) TL CVC. R) PIV TKO, L) PIV SL.   Will continue to monitor and follow POC.

## 2023-10-01 NOTE — PROGRESS NOTES
Vascular Surgery Progress Note  10/01/2023       Subjective:  Low grade temps overnight. Continues on CRRT.      Objective:  Temp:  [98.8  F (37.1  C)-100.2  F (37.9  C)] 99.9  F (37.7  C)  Pulse:  [105-126] 106  Resp:  [10-31] 12  MAP:  [61 mmHg-125 mmHg] 80 mmHg  Arterial Line BP: ()/(48-89) 117/62  FiO2 (%):  [40 %-60 %] 40 %  SpO2:  [91 %-100 %] 97 %    I/O last 3 completed shifts:  In: 2530.52 [I.V.:1030.52; NG/GT:180]  Out: 1736.9 [Urine:104; Drains:650; Other:982.9]      Gen: intubated, sedated, opens eyes to voice  CV: RR per radial pulse, off pressors, sinus tach per tele with PACs  Resp: intubated, on ventilator  Abd: wound vac in place, holding seal, abdomen soft to palpation around VAC.    Ext: RLE wwp, mulitphasic DP and PT signal. LLE cool to touch,dusky, some redness appreciated on dorsal aspect of foot. Blistering appreciated on proximal calf posteriorly. no DP/PT signals. Weak monophasic popliteal signal. Compartments soft, compressible.     UOP: approximately 5cc/hr   Temp abd closure output: ~75-100cc/hr       Labs:  Recent Labs   Lab 10/01/23  0344 09/30/23  1003 09/30/23  0403   WBC 9.4 8.0 7.4   HGB 8.0* 8.2* 8.2*   PLT 59* 64* 74*         Recent Labs   Lab 10/01/23  0802 10/01/23  0344 10/01/23  0343 10/01/23  0009 09/30/23  2218 09/30/23  1931 09/30/23  1647 09/30/23  1546 09/30/23  1316 09/30/23  1003   NA  --  140  140  --   --   --  141  --   --   --  141   POTASSIUM  --  4.0  4.0  --   --   --  4.0  --  3.9  --  3.4   CHLORIDE  --  108*  108*  --   --   --  109*  --   --   --  110*   CO2  --  19*  19*  --   --   --  19*  --   --   --  15*   BUN  --  62.5*  62.5*  --   --   --  72.4*  --   --   --  74.5*   CR  --  4.18*  4.18*  --   --   --  5.22*  --   --   --  6.18*   * 149*  149* 141*  --    < > 140*   < >  --    < > 145*   OMAR  --  8.0*  8.0*  --   --   --  7.8*  --   --   --  7.2*   MAG  --  2.1  --   --   --  2.0  --   --   --  1.8   PHOS  --  3.3  --  3.4  --   2.8  --  1.9*  --  1.7*    < > = values in this interval not displayed.         Imaging:  EXAM: XR CHEST PORT 1 VIEW  10/1/2023 6:12 AM      HISTORY:  follow pleural effusions s/p AAA repair        COMPARISON:  9/30/2023     TECHNIQUE: Portable AP semiupright view of the chest     FINDINGS:   Endotracheal tube, right IJ CVC, enteric tube are in stable position.     The trachea is midline. The cardiomediastinal silhouette is stable. No  pneumothorax. Stable small bilateral pleural effusions. Stable hazy  opacities in the mid to lower lung fields. No focal consolidation.                                                                      IMPRESSION:  1. Stable small bilateral pleural effusions.  2. Stable hazy opacities in the mid to lower lung field likely  representing a combination of pulmonary edema and layering pleural  effusion.  3. Support devices are in stable position.     I have personally reviewed the examination and initial interpretation  and I agree with the findings.     HYACINTH SUAZO MD              Assessment/Plan:   68 year old male with PMH of HTN and previous TIA who presented with R sided abdominal pain found to have contained ruptured AAA, now s/p open AAA repair 9/24 into 9/25am with 30 min supraceliac clamp time, prolonged inter-renal clamp time, temporary abdominal closure. He remains critically ill in the ICU. He did have bloody stool postop with flex sig 9/25 demonstrating ischemic mucosal changes of the rectum, however on repeated abdominal looks he has had no evidence of transmural necrosis of the small or large intestine, or the rectum. 9/29 he was started on CRRT given ongoing low UOP and rising Cr and failure of lasix challenge. Hemodynamically he continues to do well off pressors with some lower pressures with volume pulling from CRRT.  Ongoing absent signals with ischemic LLE, note DVT in this leg, this is to be expected given ischemia and absent inflow. We will continue to  monitor LLE given it is not currently making patient systemically ill and there is no indication for urgent intervention. We will continue to follow him closely today.       - Will plan for RTOR Monday 10/2 for washout abdomen.   - Pain/Sedation per ICU, appreciate sedation vacation to assess neuro status  - Wean vent as able, continue vent support  - Goal SBP <160, MAP >65  - Appreciate flotract monitoring  - Continue trickle feeds at this time.  - Plan for general surgery consult next week for consideration and assistance with abdominal closure, anticipate patient may need mesh closure. Discussed with general surgery team and they are aware consult will be placed next week.   - Appreciate nephrology consult and recommendations  - Continue CRRT at this time  - Sputum culture with 1+ aspergillus and 3+ candida, started on voriconazole empirically, continue today.   - Blood cx, urine cx today with low grade temps  - HIT screen as Plts continue to drift down despite plt transfusions, 59 today previously had primitivo 9/28 of 66 after being 90s-100s, with appropriate increase back to  after 2u plts and then drop again 9/29 back to 69. We will plan for HIT screen given this, although likelihood low. 4T score of 3, with onset 4 days after exposure, but with continuing drop 5 days postop, with no new identifiable thrombosis that we're aware of, <50% drop in plts, and possible although not definite other causes.  - We note the DVT in the LLE, which is ischemic and we expect there to be  thrombosis from stasis in the tibial and popliteal veins as this is the level the patient is ischemic. No indication to anticoagulate for this.       Seen and Discussed with vascular surgery staff, Dr. Kandace Mcleod who is in agreement with the above.    Will Donald MD   Vascualr Surgery PGY3

## 2023-10-01 NOTE — ANESTHESIA PREPROCEDURE EVALUATION
Anesthesia Pre-Procedure Evaluation    Patient: Alfredo Burnham   MRN: [id number] : 1955        Procedure : Procedure(s):  Washout abdomen, possible closure, wound vac change left lower extremity          Past Medical History:   Diagnosis Date     Hypertension 2011     Macular degeneration       Past Surgical History:   Procedure Laterality Date     INCISION AND DRAINAGE ABDOMEN WASHOUT, COMBINED N/A 2023    Procedure: abdominal washout, combined, repacking,  abthera placement;  Surgeon: Ash Boucher MBBS;  Location: [facility] OR     INCISION AND DRAINAGE ABDOMEN WASHOUT, COMBINED N/A 2023    Procedure: Abdominal washout, Retroperitoneal closure, washout left lower extremity wound;  Surgeon: Boris Lowe;  Location: [facility] OR     IR OR ANGIOGRAM  2023     LAPAROTOMY EXPLORATORY N/A 2023    Procedure: Laparotomy exploratory;  Surgeon: Roger Shirley MD;  Location: [facility]     REPAIR ANEURYSM ABDOMINAL AORTA N/A 2023    Procedure: Resection of Ruptureed Abdominal Aneurysm, Aortic biliary bypass with 20 x 10 mm Bifurcated hemagard graft, Temporary Abdominal Closure, Endovascular Balloon Inclusion of Aorta;  Surgeon: Boris Lowe;  Location: [facility]     SIGMOIDOSCOPY FLEXIBLE N/A 2023    Procedure: Sigmoidoscopy flexible;  Surgeon: Gabino Walker MD;  Location: [facility]     THROMBECTOMY LOWER EXTREMITY Left 2023    Procedure: SFA, popliteal, and tibial thromboembolectomy and four compartment fasciotomy;  Surgeon: Ash Boucher MBBS;  Location: [facility]      Allergies   Allergen Reactions     Penicillins Swelling     Facial swelling      Social History     Tobacco Use     Smoking status: Every Day     Packs/day: 0.50     Types: Cigarettes     Smokeless tobacco: Not on file   Substance Use Topics     Alcohol use: Yes     Comment: 1-2/wk      Wt Readings from Last 1 Encounters:   10/01/23 95.3 kg (210 lb 1.6 oz)        Anesthesia Evaluation   Pt has had prior  anesthetic. Type: General.        ROS/MED HX  ENT/Pulmonary: Comment: The patient is mechanical ventilated.    (+)     RIGO risk factors,  hypertension,         tobacco use, Current use,                      Neurologic:     (+)              TIA,                  Cardiovascular:     (+)  hypertension- -   -  - -                                      METS/Exercise Tolerance:     Hematologic: Comments: Thrombocytopenia  Hgb 8.0    (+)      anemia,          Musculoskeletal:       GI/Hepatic:     (+) GERD,                   Renal/Genitourinary: Comment: On CRRT - neg Renal ROS   (+) renal disease,             Endo:  - neg endo ROS  (-) Type II DM   Psychiatric/Substance Use:     (+)   alcohol abuse      Infectious Disease:  - neg infectious disease ROS     Malignancy:       Other:            Physical Exam    Airway   unable to assess          Respiratory Devices and Support         Dental    unable to assess        Cardiovascular    unable to assess         Pulmonary    Unable to assess           OUTSIDE LABS:  CBC:   Lab Results   Component Value Date    WBC 9.4 10/01/2023    WBC 8.0 09/30/2023    HGB 8.0 (L) 10/01/2023    HGB 8.2 (L) 09/30/2023    HCT 24.0 (L) 10/01/2023    HCT 25.0 (L) 09/30/2023    PLT 59 (L) 10/01/2023    PLT 64 (L) 09/30/2023     BMP:   Lab Results   Component Value Date     10/01/2023     10/01/2023     10/01/2023    POTASSIUM 4.1 10/01/2023    POTASSIUM 4.0 10/01/2023    POTASSIUM 4.0 10/01/2023    CHLORIDE 107 10/01/2023    CHLORIDE 108 (H) 10/01/2023    CHLORIDE 108 (H) 10/01/2023    CO2 19 (L) 10/01/2023    CO2 19 (L) 10/01/2023    CO2 19 (L) 10/01/2023    BUN 55.8 (H) 10/01/2023    BUN 62.5 (H) 10/01/2023    BUN 62.5 (H) 10/01/2023    CR 3.32 (H) 10/01/2023    CR 4.18 (H) 10/01/2023    CR 4.18 (H) 10/01/2023     (H) 10/01/2023     (H) 10/01/2023     COAGS:   Lab Results   Component Value Date    PTT 39 (H) 09/30/2023    INR 1.28 (H) 10/01/2023    FIBR 624 (H)  10/01/2023     POC: No results found for: BGM, HCG, HCGS  HEPATIC:   Lab Results   Component Value Date    ALBUMIN 2.5 (L) 10/01/2023    PROTTOTAL 4.1 (L) 10/01/2023    ALT 26 10/01/2023    AST 88 (H) 10/01/2023    ALKPHOS 103 10/01/2023    BILITOTAL 1.6 (H) 10/01/2023     OTHER:   Lab Results   Component Value Date    PH 7.42 10/01/2023    LACT 1.6 10/01/2023    A1C 5.7 (H) 09/25/2023    OMAR 7.7 (L) 10/01/2023    PHOS 3.1 10/01/2023    MAG 2.2 10/01/2023    LIPASE 21 09/24/2023    TSH 2.978 02/24/2009       Anesthesia Plan    ASA Status:  4    NPO Status:  NPO Appropriate    Anesthesia Type: General.     - Airway: ETT   Induction: Intravenous.   Maintenance: Balanced.        Consents            Postoperative Care    Pain management: IV analgesics.   PONV prophylaxis: Ondansetron (or other 5HT-3)     Comments:                Leslie Goldberg, MD

## 2023-10-01 NOTE — PROGRESS NOTES
SURGICAL ICU PROGRESS NOTE  10/01/2023        Date of Service (when I saw the patient): 09/30/2023     ASSESSMENT:  ASSESSMENT:  Alfredo Burnham is a 68 year old male who was admitted to the SICU on 9/24/2023 s/p open AAA repair. Patient has a history of HTN and previous TIA. He presented to the ED on 9/24 with right sided abdominal pain and was found to have a contained, ruptured AAA on CT. 30 min super-celiac clamp time. Prolonged intra-renal clamp. 9/25 s/p flex sig showing rectal ischemia, abdominal washout showing a hemostatic AAA graft and no evidence of transmural colonic or small bowel ischemia, and an unsuccessful LLE embolectomy. 9/26 s/p repeat abdominal washout, retroperitoneal closure, LLE wound washout/closure. 9/29 s/p repeat abd washout, bowel appeared well perfused w/o notable ischemia.        CHANGES and MAJOR THINGS TODAY:   - Sedation vacation  - Transition from Propofol to Precedex  - PST  - Hold UF this am, reevaluate this afternoon  - Albumin 25 g 25% x 1  - BC x 2, UA with reflex  - Remove aparicio   - Increase voriconazole dosage to 600mg BID x24 hours; then 300mg BID tomorrow    PLAN:     Neurological:  # Acute pain   # Mixed metabolic encephalopathy   - Monitor neurological status. Delirium preventions and precautions  - Sedation plan: propofol infusion. Transition to Precedex gtt  - RASS goal 0 to -1  - Pain: fentanyl gtt  - Sedation vacation this morning, terminated in the setting of hypertension (SBP 190s).   - Plan sedation vacation in am.      Pulmonary:  # Acute hypoxic respiratory failure  - VENT: /16/8/50-->40%  - PST daily. Adequate mechanics and RSBI  - Hold on extubation given encephalopathy, need for airway protection  - Ventilatory bundle. HOB elevation   - CXR: small bilateral pleural effusions. Mild pulmonary edema.   - Bronch 9/28 for L opacification of the isidro-field.     Cardiovascular:    # Contained AAA rupture s/p open repair  # Suspected emboli to LLE  #Hx  of HTN  #Sinus tachycardia  #Ischemic LLE  --> 9/30 Concern for pulmonary embolus by primary team noted in the setting of sinus tachycardia.   - 12 lead EKG without evidence of right heart strain  - My personal bedside ECHO 9/30: TAPSE 1.8 cm. RVSP 14, normal chamber sizes, no RV dilation. Cannot estimate RAP given inability to visualize IVC given abthera device. Normal LV function. No obvious valvulopathies. Repeat POCUS today: TAPSE 1.93 cm, IVC visualized 1.3 cm with > 50% variation (while on PPV), RV chamber small, RVSP 6, estimated RAP 3. MAPSE normal.   - Based on ECHO findings, SVV 17%, and PPV, will hold on UF, give small concentrated albumin bolus, will reevaluate in 4 hours.  - Overall low suspicion for PE at this time.   - Monitor hemodynamic status.   - Goal MAP >65  - Ischemic LLL- no pulse signals. Intra op evaluation 9/29 with viable muscle. Noted DVT expected in the setting of ischemia and absent inflow.      GI/Nutrition:    # Protein calorie deficit malnutrition  #s/p open AAA repair, abthera device in place  # Post-operative melena  # Rectal ischemia  - PPI for ppx  - TPN. Continue trophic feeds  - Abthera device in place.  - Tentative RTOR 10/2 for washout. General surgery consult this week for assistance in abdominal closure.      Renal:   # MAYA  - Total volume up with third spacing.   - Failed response to diuresis 9/30. Started on CRRT 9/30 for volume management.   - Access: Right femoral temporary dialysis catheter  - I&O +1L/ 24 hours, -440 since midnight. Weight 23 kg up since admission  - UF paused this morning given relative intravascular hypovolemia, will reevaluate      Endocrine:  # Stress hyperglycemia  - Q6 BG checks, medium sliding scale available.      Infectious disease:   #Fever  #Positive aspergillus in respiratory tract  - T max 100.5 with CRRT heater OFF.   - Obtain surveillance BC and urinalysis with reflex. Remove aparicio  - 9/28 BAL with 3+ candida albicans, +1 aspergillus  fumigatus  - Unclear significance of aspergillus,  but given tenuous clinical status, it seems prudent to treat at this time.   - Started on Voriconazole 600 mg BID x24 hours then followed by 300 mg BID  - Empirically treated with Metronidazole and Cefepime given prior concerns of intestinal ischemia. 9/29 intra op report reviewed, healthy appearing SB, colon without evidence of ischemia. Empiric antimicrobials stopped 9/30        Hematology:    # Acute blood loss anemia  # Thrombocytopenia, multifactorial  # LLE DVT  Admission platelet count 94K  - 4T score: 3 points (0/1/2/0). Low probability for HIT. Thrombocytopenia likely multifactorial given acute blood loss, critical illness  - 9/30 Start subcutaneous Heparin for prophylaxis  - Hemoglobin stable 8  - 9/30 LLE DVT noted, again expected given extremity ischemia and absence of inflow. No indication to treat with therapeutic anticoagulation.       Musculoskeletal:  # Weakness and deconditioning of critical illness  # Left lower limb ischemia   - Physical and occupational therapy consult when appropriate. Passive ROM at bedside with RN     General Cares/Prophylaxis:    DVT Prophylaxis: Pneumatic Compression Devices; subcutaneous heparin   GI Prophylaxis: PPI  Restraints: Restraints for medical healing needed: YES     Lines/ tubes/ drains:  - ETT  - Lu- dc  - Abthera  - PIV x3  - RIJ MAC  - R radial arterial line  - R FV dialysis catheter     Disposition:  - Surgical ICU      Patient seen, findings and plan discussed with surgical ICU staff, Dr. Escobedo.  Critical care care exclusive of procedures 60 minutes    Casi De La Mater       ====================================  INTERVAL HISTORY:   Sinus tachycardia. Intubated, sedated, Unable to obtain ROS.      ROS unable to be performed due to sedation and intubation.      OBJECTIVE:   1. VITAL SIGNS:   Temp:  [98.2  F (36.8  C)-100.2  F (37.9  C)] 99.5  F (37.5  C)  Pulse:  [] 108  Resp:  [0-19] 18  BP:  (112-136)/(54-79) 123/58  MAP:  [41 mmHg-175 mmHg] 81 mmHg  Arterial Line BP: ()/(47-75) 129/59  FiO2 (%):  [40 %-50 %] 50 %  SpO2:  [92 %-100 %] 98 %  Vent Mode: CMV/AC  (Continuous Mandatory Ventilation/ Assist Control)  FiO2 (%): 50 %  Resp Rate (Set): 16 breaths/min  Tidal Volume (Set, mL): 450 mL  PEEP (cm H2O): 8 cmH2O  Resp: 18        2. INTAKE/ OUTPUT:   I/O last 3 completed shifts:  In: 4232.15 [I.V.:2267.65; NG/GT:30]  Out: 3125 [Urine:125; Emesis/NG output:700; Drains:2300]     3. PHYSICAL EXAMINATION:  General: Intubated, sedated  HEENT: Normocephalic, atraumatic. NGT to LIS, ETT  Neuro: Pupils equal and reactive, withdraws upper extremities to noxious stimulus.   Pulm/Resp: coarse lung sounds throughout, no wheezing. PIPs mid teens.   CV: RRR, sinus tachycardia on monitor.   Abdomen: Open abdomen with Abthera in place, serosanguinous output, soft, moderately distended, soft  :  aparicio catheter in place, no urine seen.   MSK/Extremities: RLE warm to palpation with strong signals. LLE cool to touch, dusky, no signals distal of popliteal.      4. INVESTIGATIONS:   Arterial Blood Gases          Recent Labs   Lab 09/30/23  0404 09/30/23  0048 09/29/23  1651 09/29/23  1211   PH 7.35 7.35 7.34* 7.30*   PCO2 35 35 36 39   PO2 72* 65* 150* 79*   HCO3 19* 19* 19* 20*      Complete Blood Count          Recent Labs   Lab 09/30/23  0403 09/30/23  0048 09/29/23 1938 09/29/23  1651   WBC 7.4 7.5 5.5 6.0   HGB 8.2* 8.5* 7.9* 8.4*   PLT 74* 75* 69* 89*      Basic Metabolic Panel           Recent Labs   Lab 09/30/23  0403 09/30/23  0050 09/30/23  0048 09/29/23 1938 09/29/23  1655 09/29/23  1651     --  140 140  --  140   POTASSIUM 3.8  --  3.8 3.8  --  3.9   CHLORIDE 108*  --  108* 108*  --  110*   CO2 17*  --  17* 18*  --  17*   BUN 74.6*  --  71.7* 67.0*  --  63.5*   CR 6.09*  --  6.11* 5.96*  --  5.79*   *  142* 123* 136* 122*   < > 120*    < > = values in this interval not displayed.      Liver  Function Tests         Recent Labs   Lab 09/30/23  0403 09/30/23  0048 09/29/23  1938 09/29/23  1651   * 135* 136* 159*   ALT 37 36 37 46   ALKPHOS 75 76 64 69   BILITOTAL 1.4* 1.5* 1.4* 1.4*   ALBUMIN 2.2* 2.5* 2.4* 2.2*   INR 1.35* 1.31* 1.37* 1.34*      Pancreatic Enzymes      Recent Labs   Lab 09/24/23  1644   LIPASE 21      Coagulation Profile         Recent Labs   Lab 09/30/23  0403 09/30/23  0048 09/29/23  1938 09/29/23  1651   INR 1.35* 1.31* 1.37* 1.34*   PTT 37 38 37 34            5. RADIOLOGY:       Recent Results (from the past 24 hour(s))   XR Chest Port 1 View     Impression     RESIDENT PRELIMINARY INTERPRETATION  IMPRESSION:  1. Stable small bilateral pleural effusions.  2. Stable hazy opacities in the mid to lower lung fields bilaterally  likely representing pulmonary edema.  3. Support devices are in stable position.         =========================================

## 2023-10-01 NOTE — PROGRESS NOTES
"Nephrology  Progress note- continuous dialysis visit  10/01/2023     Alfredo Burnham was seen once on CRRT for volume management and dialysis prescription. Alfredo Burnham's blood pressure has been lower but not on pressors. Received colloid this am. He is tolerating CRRT    Laboratory results and nurses' notes were reviewed.       Intake/Output Summary (Last 24 hours) at 10/1/2023 1204  Last data filed at 10/1/2023 1100  Gross per 24 hour   Intake 2450.01 ml   Output 2368.6 ml   Net 81.41 ml      BP (!) 165/72   Pulse 100   Temp 100  F (37.8  C)   Resp 14   Ht 1.727 m (5' 8\")   Wt 95.3 kg (210 lb 1.6 oz)   SpO2 98%   BMI 31.95 kg/m    Current Facility-Administered Medications   Medication    acetaminophen (TYLENOL) tablet 650 mg    B and C vitamin Complex with folic acid (NEPHRONEX) liquid 10 mL    bisacodyl (DULCOLAX) suppository 10 mg    calcium gluconate 2 g in  mL intermittent infusion    calcium gluconate 4 g in sodium chloride 0.9 % 100 mL intermittent infusion    chlorhexidine (PERIDEX) 0.12 % solution 15 mL    dexmedeTOMIDine (PRECEDEX) 4 mcg/mL in NS infusion    dextrose 10% infusion    dextrose 10% infusion    dextrose 10% infusion    glucose gel 15-30 g    Or    dextrose 50 % injection 25-50 mL    Or    glucagon injection 1 mg    dialysate for CVVHD & CVVHDF (Phoxillum BK4/2.5)    fentaNYL (SUBLIMAZE) infusion    heparin ANTICOAGULANT injection 5,000 Units    hydrALAZINE (APRESOLINE) injection 10 mg    insulin aspart (NovoLOG) injection (RAPID ACTING)    labetalol (NORMODYNE/TRANDATE) injection 10-20 mg    lidocaine (LMX4) kit    lidocaine 1 % 0.1-1 mL    lipids plant base (CLINOLIPID) 20 % infusion 250 mL    [Held by provider] magnesium hydroxide (MILK OF MAGNESIA) suspension 30 mL    magnesium sulfate 2 g in 50 mL sterile water intermittent infusion    naloxone (NARCAN) injection 0.2 mg    Or    naloxone (NARCAN) injection 0.4 mg    Or    naloxone (NARCAN) injection 0.2 mg    Or "    naloxone (NARCAN) injection 0.4 mg    No heparin required    norepinephrine (LEVOPHED) 16 mg in  mL infusion MAX CONC CENTRAL LINE    ondansetron (ZOFRAN ODT) ODT tab 4 mg    Or    ondansetron (ZOFRAN) injection 4 mg    pantoprazole (PROTONIX) 2 mg/mL suspension 40 mg    Or    pantoprazole (PROTONIX) IV push injection 40 mg    parenteral nutrition - ADULT compounded formula    parenteral nutrition - ADULT compounded formula    [Held by provider] polyethylene glycol (MIRALAX) Packet 17 g    POST-filter replacement solution for CVVHD & CVVHDF (Phoxillum BK4/2.5)    potassium chloride 20 mEq in 50 mL intermittent infusion    potassium chloride 20 mEq in 50 mL intermittent infusion    Potassium Phosphate 15 mmol in NS intermittent infusion 15 mmol    PRE-filter replacement solution for CVVHD & CVVHDF (Phoxillum BK4/2.5)    prochlorperazine (COMPAZINE) injection 5 mg    Or    prochlorperazine (COMPAZINE) tablet 5 mg    [Held by provider] senna-docusate (SENOKOT-S/PERICOLACE) 8.6-50 MG per tablet 1 tablet    sodium chloride (PF) 0.9% PF flush 3 mL    sodium chloride (PF) 0.9% PF flush 3 mL    sodium phosphate 15 mmol in sodium chloride 0.9 % 250 mL intermittent infusion    [START ON 10/2/2023] voriconazole (VFEND) suspension 300 mg    voriconazole (VFEND) suspension 600 mg      No changes to management of volume, acidosis, or electrolytes.   Diagnosis - MAYA  BP trending down  Allow volume accumulation for low BP, if BP improved, match I=Os    Inez Stephens MD  [facility]  Department of Medicine  Division of Nephrology and Hypertension  [facility]  michelle Ch[employer] Web Console

## 2023-10-01 NOTE — PROGRESS NOTES
"CRRT STATUS NOTE    DATA:  Time:  5:57 PM  Pressures WNL:  YES  Filter Status:  WDL    Problems Reported/Alarms Noted:  None reported    Supplies Present:  YES    ASSESSMENT:  Patient Net Fluid Balance:    Intake/Output Summary (Last 24 hours) at 10/1/2023 1757  Last data filed at 10/1/2023 1700  Gross per 24 hour   Intake 3006.64 ml   Output 2585.9 ml   Net 420.74 ml       Vital Signs:  /62   Pulse 98   Temp (!) 100.6  F (38.1  C) (Esophageal)   Resp 26   Ht 1.727 m (5' 8\")   Wt 95.3 kg (210 lb 1.6 oz)   SpO2 96%   BMI 31.95 kg/m      Labs:    Lab Results   Component Value Date    WBC 7.8 10/01/2023    WBC 7.0 02/18/2009     Lab Results   Component Value Date    RBC 2.35 10/01/2023    RBC 4.58 02/18/2009     Lab Results   Component Value Date    HGB 7.7 10/01/2023    HGB 15.1 02/18/2009     Lab Results   Component Value Date    HCT 23.1 10/01/2023    HCT 43.9 02/18/2009     No components found for: MCT  Lab Results   Component Value Date    MCV 98 10/01/2023    MCV 96 02/18/2009     Lab Results   Component Value Date    MCH 32.8 10/01/2023    MCH 33.0 02/18/2009     Lab Results   Component Value Date    MCHC 33.3 10/01/2023    MCHC 34.4 02/18/2009     Lab Results   Component Value Date    RDW 17.8 10/01/2023    RDW 12.4 02/18/2009     Lab Results   Component Value Date    PLT 45 10/01/2023     02/18/2009     Last Comprehensive Metabolic Panel:  Lab Results   Component Value Date     10/01/2023    POTASSIUM 4.1 10/01/2023    CHLORIDE 107 10/01/2023    CO2 19 (L) 10/01/2023    ANIONGAP 13 10/01/2023     (H) 10/01/2023    BUN 55.8 (H) 10/01/2023    CR 3.32 (H) 10/01/2023    GFRESTIMATED 19 (L) 10/01/2023    OMAR 7.7 (L) 10/01/2023         Goals of Therapy:      allow volume accumulation if low/soft BP, if SBP improves then match I=O       INTERVENTIONS:   Checked in with bedside RN    PLAN:  Continue with treatment goals. Call CRRT RN at [phone] with questions and concerns.      "

## 2023-10-01 NOTE — PLAN OF CARE
Problem: Aortic Aneurysm/Dissection Repair  Goal: Absence of Infection Signs and Symptoms  Outcome: Not Progressing   Goal Outcome Evaluation:       D. Wbc 7- temp increased to 101.1 - on CRRT- with temp setting at 35 C. degrees  abd wound with wound vac.- open  L arm and L leg wound-  Leg cold and mottled no pulse ( md aware).  A cult sent- x ray ( chest) .   I antibiotics tylenol given -

## 2023-10-01 NOTE — PROVIDER NOTIFICATION
D. Temp slowly rising to 101 degrees  esophageal - crrt heater turned down to 35 degrees, tylenol given,  MD notified . Hr 100- ben 100/65

## 2023-10-01 NOTE — PROGRESS NOTES
CRRT STATUS NOTE    DATA:  Time:  6:19 AM  Pressures WNL:  YES  Filter Status:  WDL    Problems Reported/Alarms Noted:  none    Supplies Present:  YES    ASSESSMENT:  Patient Net Fluid Balance:  net +1000 9/30; net -340 since midnight; net +24,700 since admit  Vital Signs:  vitals reviewed.    Labs:  Low grade fever 100; ; RR 15 on vent; O2 40% on vent with O2 sats >92%  Goals of Therapy:  -25 ml/hr    INTERVENTIONS:   none    PLAN:  Fluid removal per goals of therapy as tolerated.  Restart every 72 hours and PRN.  Please notify CRRT resource RN with questions and concerns

## 2023-10-02 ENCOUNTER — APPOINTMENT (OUTPATIENT)
Dept: GENERAL RADIOLOGY | Facility: CLINIC | Age: 68
DRG: 268 | End: 2023-10-02
Attending: SURGERY
Payer: COMMERCIAL

## 2023-10-02 ENCOUNTER — ANESTHESIA (OUTPATIENT)
Dept: SURGERY | Facility: CLINIC | Age: 68
DRG: 268 | End: 2023-10-02
Payer: COMMERCIAL

## 2023-10-02 ENCOUNTER — APPOINTMENT (OUTPATIENT)
Dept: ULTRASOUND IMAGING | Facility: CLINIC | Age: 68
DRG: 268 | End: 2023-10-02
Attending: NURSE PRACTITIONER
Payer: COMMERCIAL

## 2023-10-02 LAB
ABO/RH(D): NORMAL
ALBUMIN SERPL BCG-MCNC: 2.5 G/DL (ref 3.5–5.2)
ALBUMIN SERPL BCG-MCNC: 2.6 G/DL (ref 3.5–5.2)
ALP SERPL-CCNC: 127 U/L (ref 40–129)
ALT SERPL W P-5'-P-CCNC: 25 U/L (ref 0–70)
ANION GAP SERPL CALCULATED.3IONS-SCNC: 13 MMOL/L (ref 7–15)
ANION GAP SERPL CALCULATED.3IONS-SCNC: 13 MMOL/L (ref 7–15)
ANION GAP SERPL CALCULATED.3IONS-SCNC: 14 MMOL/L (ref 7–15)
ANION GAP SERPL CALCULATED.3IONS-SCNC: 14 MMOL/L (ref 7–15)
ANTIBODY SCREEN: NEGATIVE
AST SERPL W P-5'-P-CCNC: 66 U/L (ref 0–45)
ATRIAL RATE - MUSE: 106 BPM
BILIRUB DIRECT SERPL-MCNC: 1.48 MG/DL (ref 0–0.3)
BILIRUB SERPL-MCNC: 1.8 MG/DL
BLD PROD TYP BPU: NORMAL
BLD PROD TYP BPU: NORMAL
BLOOD COMPONENT TYPE: NORMAL
BLOOD COMPONENT TYPE: NORMAL
BUN SERPL-MCNC: 50.2 MG/DL (ref 8–23)
BUN SERPL-MCNC: 50.2 MG/DL (ref 8–23)
BUN SERPL-MCNC: 54.1 MG/DL (ref 8–23)
BUN SERPL-MCNC: 55.5 MG/DL (ref 8–23)
CA-I BLD-MCNC: 4.2 MG/DL (ref 4.4–5.2)
CA-I BLD-MCNC: 4.5 MG/DL (ref 4.4–5.2)
CA-I BLD-MCNC: 4.5 MG/DL (ref 4.4–5.2)
CALCIUM SERPL-MCNC: 7.4 MG/DL (ref 8.8–10.2)
CALCIUM SERPL-MCNC: 7.5 MG/DL (ref 8.8–10.2)
CALCIUM SERPL-MCNC: 7.5 MG/DL (ref 8.8–10.2)
CALCIUM SERPL-MCNC: 8.2 MG/DL (ref 8.8–10.2)
CHLORIDE SERPL-SCNC: 106 MMOL/L (ref 98–107)
CHLORIDE SERPL-SCNC: 106 MMOL/L (ref 98–107)
CHLORIDE SERPL-SCNC: 107 MMOL/L (ref 98–107)
CHLORIDE SERPL-SCNC: 107 MMOL/L (ref 98–107)
CODING SYSTEM: NORMAL
CODING SYSTEM: NORMAL
CREAT SERPL-MCNC: 2.38 MG/DL (ref 0.67–1.17)
CREAT SERPL-MCNC: 2.68 MG/DL (ref 0.67–1.17)
CREAT SERPL-MCNC: 2.71 MG/DL (ref 0.67–1.17)
CREAT SERPL-MCNC: 2.71 MG/DL (ref 0.67–1.17)
DEPRECATED HCO3 PLAS-SCNC: 18 MMOL/L (ref 22–29)
DEPRECATED HCO3 PLAS-SCNC: 18 MMOL/L (ref 22–29)
DEPRECATED HCO3 PLAS-SCNC: 19 MMOL/L (ref 22–29)
DEPRECATED HCO3 PLAS-SCNC: 20 MMOL/L (ref 22–29)
DIASTOLIC BLOOD PRESSURE - MUSE: NORMAL MMHG
EGFRCR SERPLBLD CKD-EPI 2021: 24 ML/MIN/1.73M2
EGFRCR SERPLBLD CKD-EPI 2021: 24 ML/MIN/1.73M2
EGFRCR SERPLBLD CKD-EPI 2021: 25 ML/MIN/1.73M2
EGFRCR SERPLBLD CKD-EPI 2021: 29 ML/MIN/1.73M2
ERYTHROCYTE [DISTWIDTH] IN BLOOD BY AUTOMATED COUNT: 17.7 % (ref 10–15)
ERYTHROCYTE [DISTWIDTH] IN BLOOD BY AUTOMATED COUNT: 17.9 % (ref 10–15)
GALACTOMANNAN AG SERPL QL IA: NEGATIVE
GALACTOMANNAN AG SPEC IA-ACNC: 0.06
GLUCOSE BLDC GLUCOMTR-MCNC: 140 MG/DL (ref 70–99)
GLUCOSE BLDC GLUCOMTR-MCNC: 142 MG/DL (ref 70–99)
GLUCOSE BLDC GLUCOMTR-MCNC: 143 MG/DL (ref 70–99)
GLUCOSE BLDC GLUCOMTR-MCNC: 144 MG/DL (ref 70–99)
GLUCOSE BLDC GLUCOMTR-MCNC: 160 MG/DL (ref 70–99)
GLUCOSE SERPL-MCNC: 146 MG/DL (ref 70–99)
GLUCOSE SERPL-MCNC: 155 MG/DL (ref 70–99)
GLUCOSE SERPL-MCNC: 155 MG/DL (ref 70–99)
GLUCOSE SERPL-MCNC: 158 MG/DL (ref 70–99)
HCT VFR BLD AUTO: 22.4 % (ref 40–53)
HCT VFR BLD AUTO: 22.9 % (ref 40–53)
HCT VFR BLD AUTO: 23.5 % (ref 40–53)
HCT VFR BLD AUTO: 24.8 % (ref 40–53)
HGB BLD-MCNC: 7.4 G/DL (ref 13.3–17.7)
HGB BLD-MCNC: 7.5 G/DL (ref 13.3–17.7)
HGB BLD-MCNC: 7.8 G/DL (ref 13.3–17.7)
HGB BLD-MCNC: 8.1 G/DL (ref 13.3–17.7)
INR PPP: 1.3 (ref 0.85–1.15)
INTERPRETATION ECG - MUSE: NORMAL
ISSUE DATE AND TIME: NORMAL
ISSUE DATE AND TIME: NORMAL
LACTATE SERPL-SCNC: 1.4 MMOL/L (ref 0.7–2)
LACTATE SERPL-SCNC: 1.4 MMOL/L (ref 0.7–2)
LACTATE SERPL-SCNC: 1.8 MMOL/L (ref 0.7–2)
MAGNESIUM SERPL-MCNC: 2.3 MG/DL (ref 1.7–2.3)
MAGNESIUM SERPL-MCNC: 2.3 MG/DL (ref 1.7–2.3)
MAGNESIUM SERPL-MCNC: 2.5 MG/DL (ref 1.7–2.3)
MCH RBC QN AUTO: 32.6 PG (ref 26.5–33)
MCH RBC QN AUTO: 32.9 PG (ref 26.5–33)
MCH RBC QN AUTO: 32.9 PG (ref 26.5–33)
MCH RBC QN AUTO: 33.5 PG (ref 26.5–33)
MCHC RBC AUTO-ENTMCNC: 32.7 G/DL (ref 31.5–36.5)
MCHC RBC AUTO-ENTMCNC: 32.8 G/DL (ref 31.5–36.5)
MCHC RBC AUTO-ENTMCNC: 33 G/DL (ref 31.5–36.5)
MCHC RBC AUTO-ENTMCNC: 33.2 G/DL (ref 31.5–36.5)
MCV RBC AUTO: 100 FL (ref 78–100)
MCV RBC AUTO: 101 FL (ref 78–100)
MCV RBC AUTO: 101 FL (ref 78–100)
MCV RBC AUTO: 99 FL (ref 78–100)
P AXIS - MUSE: 51 DEGREES
PHOSPHATE SERPL-MCNC: 3.7 MG/DL (ref 2.5–4.5)
PHOSPHATE SERPL-MCNC: 3.7 MG/DL (ref 2.5–4.5)
PHOSPHATE SERPL-MCNC: 4.1 MG/DL (ref 2.5–4.5)
PHOSPHATE SERPL-MCNC: 4.5 MG/DL (ref 2.5–4.5)
PLATELET # BLD AUTO: 33 10E3/UL (ref 150–450)
PLATELET # BLD AUTO: 38 10E3/UL (ref 150–450)
PLATELET # BLD AUTO: 66 10E3/UL (ref 150–450)
PLATELET # BLD AUTO: 67 10E3/UL (ref 150–450)
POTASSIUM SERPL-SCNC: 4 MMOL/L (ref 3.4–5.3)
POTASSIUM SERPL-SCNC: 4 MMOL/L (ref 3.4–5.3)
POTASSIUM SERPL-SCNC: 4.3 MMOL/L (ref 3.4–5.3)
POTASSIUM SERPL-SCNC: 4.4 MMOL/L (ref 3.4–5.3)
PR INTERVAL - MUSE: 162 MS
PREALB SERPL IA-MCNC: 9 MG/DL (ref 15–45)
PROT SERPL-MCNC: 4.3 G/DL (ref 6.4–8.3)
QRS DURATION - MUSE: 76 MS
QT - MUSE: 312 MS
QTC - MUSE: 414 MS
R AXIS - MUSE: 3 DEGREES
RBC # BLD AUTO: 2.27 10E6/UL (ref 4.4–5.9)
RBC # BLD AUTO: 2.28 10E6/UL (ref 4.4–5.9)
RBC # BLD AUTO: 2.33 10E6/UL (ref 4.4–5.9)
RBC # BLD AUTO: 2.46 10E6/UL (ref 4.4–5.9)
SODIUM SERPL-SCNC: 138 MMOL/L (ref 135–145)
SODIUM SERPL-SCNC: 139 MMOL/L (ref 135–145)
SPECIMEN EXPIRATION DATE: NORMAL
SYSTOLIC BLOOD PRESSURE - MUSE: NORMAL MMHG
T AXIS - MUSE: -4 DEGREES
UNIT ABO/RH: NORMAL
UNIT ABO/RH: NORMAL
UNIT NUMBER: NORMAL
UNIT NUMBER: NORMAL
UNIT STATUS: NORMAL
UNIT STATUS: NORMAL
UNIT TYPE ISBT: 5100
UNIT TYPE ISBT: 6200
VENTRICULAR RATE- MUSE: 106 BPM
WBC # BLD AUTO: 10 10E3/UL (ref 4–11)
WBC # BLD AUTO: 13.7 10E3/UL (ref 4–11)
WBC # BLD AUTO: 7.4 10E3/UL (ref 4–11)
WBC # BLD AUTO: 7.5 10E3/UL (ref 4–11)

## 2023-10-02 PROCEDURE — 258N000003 HC RX IP 258 OP 636: Performed by: NURSE ANESTHETIST, CERTIFIED REGISTERED

## 2023-10-02 PROCEDURE — 250N000013 HC RX MED GY IP 250 OP 250 PS 637: Performed by: ANESTHESIOLOGY

## 2023-10-02 PROCEDURE — 999N000063 XR ABDOMEN PORT 1 VIEW

## 2023-10-02 PROCEDURE — 85610 PROTHROMBIN TIME: CPT | Performed by: SURGERY

## 2023-10-02 PROCEDURE — 82330 ASSAY OF CALCIUM: CPT | Performed by: INTERNAL MEDICINE

## 2023-10-02 PROCEDURE — 90947 DIALYSIS REPEATED EVAL: CPT

## 2023-10-02 PROCEDURE — 84100 ASSAY OF PHOSPHORUS: CPT | Performed by: SURGERY

## 2023-10-02 PROCEDURE — 71045 X-RAY EXAM CHEST 1 VIEW: CPT | Mod: 26 | Performed by: RADIOLOGY

## 2023-10-02 PROCEDURE — 99233 SBSQ HOSP IP/OBS HIGH 50: CPT | Mod: FS | Performed by: CLINICAL NURSE SPECIALIST

## 2023-10-02 PROCEDURE — 250N000013 HC RX MED GY IP 250 OP 250 PS 637: Performed by: NURSE PRACTITIONER

## 2023-10-02 PROCEDURE — 250N000009 HC RX 250: Performed by: INTERNAL MEDICINE

## 2023-10-02 PROCEDURE — 360N000076 HC SURGERY LEVEL 3, PER MIN: Performed by: SURGERY

## 2023-10-02 PROCEDURE — 84100 ASSAY OF PHOSPHORUS: CPT | Performed by: INTERNAL MEDICINE

## 2023-10-02 PROCEDURE — 71045 X-RAY EXAM CHEST 1 VIEW: CPT

## 2023-10-02 PROCEDURE — 250N000009 HC RX 250: Performed by: NURSE ANESTHETIST, CERTIFIED REGISTERED

## 2023-10-02 PROCEDURE — 250N000013 HC RX MED GY IP 250 OP 250 PS 637: Performed by: STUDENT IN AN ORGANIZED HEALTH CARE EDUCATION/TRAINING PROGRAM

## 2023-10-02 PROCEDURE — P9037 PLATE PHERES LEUKOREDU IRRAD: HCPCS

## 2023-10-02 PROCEDURE — 84134 ASSAY OF PREALBUMIN: CPT | Performed by: SURGERY

## 2023-10-02 PROCEDURE — 272N000001 HC OR GENERAL SUPPLY STERILE: Performed by: SURGERY

## 2023-10-02 PROCEDURE — 49002 REOPENING OF ABDOMEN: CPT | Mod: 52 | Performed by: SURGERY

## 2023-10-02 PROCEDURE — 250N000009 HC RX 250: Performed by: SURGERY

## 2023-10-02 PROCEDURE — 999N000157 HC STATISTIC RCP TIME EA 10 MIN

## 2023-10-02 PROCEDURE — 85014 HEMATOCRIT: CPT | Performed by: SURGERY

## 2023-10-02 PROCEDURE — 97607 NEG PRS WND THR NDME<=50SQCM: CPT | Mod: 58 | Performed by: SURGERY

## 2023-10-02 PROCEDURE — 85027 COMPLETE CBC AUTOMATED: CPT | Performed by: INTERNAL MEDICINE

## 2023-10-02 PROCEDURE — 250N000011 HC RX IP 250 OP 636: Mod: JZ | Performed by: INTERNAL MEDICINE

## 2023-10-02 PROCEDURE — 74018 RADEX ABDOMEN 1 VIEW: CPT | Mod: 26 | Performed by: RADIOLOGY

## 2023-10-02 PROCEDURE — 83605 ASSAY OF LACTIC ACID: CPT | Performed by: NURSE PRACTITIONER

## 2023-10-02 PROCEDURE — 370N000017 HC ANESTHESIA TECHNICAL FEE, PER MIN: Performed by: SURGERY

## 2023-10-02 PROCEDURE — 99291 CRITICAL CARE FIRST HOUR: CPT | Mod: GC | Performed by: ANESTHESIOLOGY

## 2023-10-02 PROCEDURE — 250N000013 HC RX MED GY IP 250 OP 250 PS 637: Performed by: SURGERY

## 2023-10-02 PROCEDURE — 200N000002 HC R&B ICU UMMC

## 2023-10-02 PROCEDURE — 93926 LOWER EXTREMITY STUDY: CPT | Mod: LT

## 2023-10-02 PROCEDURE — 86923 COMPATIBILITY TEST ELECTRIC: CPT | Performed by: PHYSICIAN ASSISTANT

## 2023-10-02 PROCEDURE — 250N000009 HC RX 250: Performed by: NURSE PRACTITIONER

## 2023-10-02 PROCEDURE — 250N000011 HC RX IP 250 OP 636: Performed by: NURSE ANESTHETIST, CERTIFIED REGISTERED

## 2023-10-02 PROCEDURE — 93926 LOWER EXTREMITY STUDY: CPT | Mod: 26 | Performed by: RADIOLOGY

## 2023-10-02 PROCEDURE — 250N000011 HC RX IP 250 OP 636: Performed by: STUDENT IN AN ORGANIZED HEALTH CARE EDUCATION/TRAINING PROGRAM

## 2023-10-02 PROCEDURE — 86900 BLOOD TYPING SEROLOGIC ABO: CPT | Performed by: SURGERY

## 2023-10-02 PROCEDURE — 94003 VENT MGMT INPAT SUBQ DAY: CPT

## 2023-10-02 PROCEDURE — 0WJG0ZZ INSPECTION OF PERITONEAL CAVITY, OPEN APPROACH: ICD-10-PCS | Performed by: SURGERY

## 2023-10-02 PROCEDURE — 83735 ASSAY OF MAGNESIUM: CPT | Performed by: INTERNAL MEDICINE

## 2023-10-02 PROCEDURE — 80053 COMPREHEN METABOLIC PANEL: CPT | Performed by: SURGERY

## 2023-10-02 PROCEDURE — 82248 BILIRUBIN DIRECT: CPT | Performed by: INTERNAL MEDICINE

## 2023-10-02 PROCEDURE — 250N000025 HC SEVOFLURANE, PER MIN: Performed by: SURGERY

## 2023-10-02 PROCEDURE — 250N000011 HC RX IP 250 OP 636

## 2023-10-02 PROCEDURE — 250N000011 HC RX IP 250 OP 636: Mod: JZ

## 2023-10-02 PROCEDURE — 250N000013 HC RX MED GY IP 250 OP 250 PS 637

## 2023-10-02 RX ORDER — ACETAMINOPHEN 325 MG/10.15ML
650 LIQUID ORAL EVERY 6 HOURS PRN
Status: DISCONTINUED | OUTPATIENT
Start: 2023-10-02 | End: 2023-10-03

## 2023-10-02 RX ORDER — SODIUM CHLORIDE, SODIUM LACTATE, POTASSIUM CHLORIDE, CALCIUM CHLORIDE 600; 310; 30; 20 MG/100ML; MG/100ML; MG/100ML; MG/100ML
INJECTION, SOLUTION INTRAVENOUS CONTINUOUS PRN
Status: DISCONTINUED | OUTPATIENT
Start: 2023-10-02 | End: 2023-10-02

## 2023-10-02 RX ORDER — HEPARIN SODIUM 5000 [USP'U]/.5ML
5000 INJECTION, SOLUTION INTRAVENOUS; SUBCUTANEOUS EVERY 8 HOURS
Status: CANCELLED | OUTPATIENT
Start: 2023-10-02

## 2023-10-02 RX ORDER — FENTANYL CITRATE 50 UG/ML
INJECTION, SOLUTION INTRAMUSCULAR; INTRAVENOUS PRN
Status: DISCONTINUED | OUTPATIENT
Start: 2023-10-02 | End: 2023-10-02

## 2023-10-02 RX ADMIN — HYDROCORTISONE SODIUM SUCCINATE 50 MG: 100 INJECTION, POWDER, FOR SOLUTION INTRAMUSCULAR; INTRAVENOUS at 17:26

## 2023-10-02 RX ADMIN — VORICONAZOLE 300 MG: 40 POWDER, FOR SUSPENSION ORAL at 19:45

## 2023-10-02 RX ADMIN — INSULIN ASPART 1 UNITS: 100 INJECTION, SOLUTION INTRAVENOUS; SUBCUTANEOUS at 00:07

## 2023-10-02 RX ADMIN — HYDROCORTISONE SODIUM SUCCINATE 50 MG: 100 INJECTION, POWDER, FOR SOLUTION INTRAMUSCULAR; INTRAVENOUS at 23:48

## 2023-10-02 RX ADMIN — PHENYLEPHRINE HYDROCHLORIDE 100 MCG: 10 INJECTION INTRAVENOUS at 11:52

## 2023-10-02 RX ADMIN — INSULIN ASPART 1 UNITS: 100 INJECTION, SOLUTION INTRAVENOUS; SUBCUTANEOUS at 16:47

## 2023-10-02 RX ADMIN — METRONIDAZOLE 500 MG: 5 INJECTION, SOLUTION INTRAVENOUS at 05:19

## 2023-10-02 RX ADMIN — METRONIDAZOLE 500 MG: 5 INJECTION, SOLUTION INTRAVENOUS at 17:26

## 2023-10-02 RX ADMIN — CALCIUM CHLORIDE, MAGNESIUM CHLORIDE, SODIUM CHLORIDE, SODIUM BICARBONATE, POTASSIUM CHLORIDE AND SODIUM PHOSPHATE DIBASIC DIHYDRATE 12.5 ML/KG/HR: 3.68; 3.05; 6.34; 3.09; .314; .187 INJECTION INTRAVENOUS at 03:10

## 2023-10-02 RX ADMIN — INSULIN ASPART 1 UNITS: 100 INJECTION, SOLUTION INTRAVENOUS; SUBCUTANEOUS at 07:57

## 2023-10-02 RX ADMIN — Medication 20 MG: at 11:30

## 2023-10-02 RX ADMIN — MAGNESIUM SULFATE HEPTAHYDRATE: 500 INJECTION, SOLUTION INTRAMUSCULAR; INTRAVENOUS at 20:00

## 2023-10-02 RX ADMIN — Medication 30 MG: at 12:21

## 2023-10-02 RX ADMIN — VORICONAZOLE 300 MG: 40 POWDER, FOR SUSPENSION ORAL at 07:43

## 2023-10-02 RX ADMIN — Medication 50 MG: at 10:35

## 2023-10-02 RX ADMIN — CHLORHEXIDINE GLUCONATE 0.12% ORAL RINSE 15 ML: 1.2 LIQUID ORAL at 19:49

## 2023-10-02 RX ADMIN — CALCIUM CHLORIDE, MAGNESIUM CHLORIDE, SODIUM CHLORIDE, SODIUM BICARBONATE, POTASSIUM CHLORIDE AND SODIUM PHOSPHATE DIBASIC DIHYDRATE 12.5 ML/KG/HR: 3.68; 3.05; 6.34; 3.09; .314; .187 INJECTION INTRAVENOUS at 16:00

## 2023-10-02 RX ADMIN — CALCIUM CHLORIDE, MAGNESIUM CHLORIDE, SODIUM CHLORIDE, SODIUM BICARBONATE, POTASSIUM CHLORIDE AND SODIUM PHOSPHATE DIBASIC DIHYDRATE: 3.68; 3.05; 6.34; 3.09; .314; .187 INJECTION INTRAVENOUS at 19:49

## 2023-10-02 RX ADMIN — PHENYLEPHRINE HYDROCHLORIDE 50 MCG: 10 INJECTION INTRAVENOUS at 11:19

## 2023-10-02 RX ADMIN — Medication 5 MG: at 17:26

## 2023-10-02 RX ADMIN — THIAMINE HCL TAB 100 MG 100 MG: 100 TAB at 19:45

## 2023-10-02 RX ADMIN — CHLORHEXIDINE GLUCONATE 0.12% ORAL RINSE 15 ML: 1.2 LIQUID ORAL at 07:43

## 2023-10-02 RX ADMIN — INSULIN ASPART 1 UNITS: 100 INJECTION, SOLUTION INTRAVENOUS; SUBCUTANEOUS at 23:54

## 2023-10-02 RX ADMIN — CALCIUM CHLORIDE, MAGNESIUM CHLORIDE, SODIUM CHLORIDE, SODIUM BICARBONATE, POTASSIUM CHLORIDE AND SODIUM PHOSPHATE DIBASIC DIHYDRATE 12.5 ML/KG/HR: 3.68; 3.05; 6.34; 3.09; .314; .187 INJECTION INTRAVENOUS at 07:53

## 2023-10-02 RX ADMIN — DEXMEDETOMIDINE HYDROCHLORIDE 0.5 MCG/KG/HR: 400 INJECTION INTRAVENOUS at 20:23

## 2023-10-02 RX ADMIN — Medication 125 MCG/HR: at 07:43

## 2023-10-02 RX ADMIN — CALCIUM CHLORIDE, MAGNESIUM CHLORIDE, SODIUM CHLORIDE, SODIUM BICARBONATE, POTASSIUM CHLORIDE AND SODIUM PHOSPHATE DIBASIC DIHYDRATE 12.5 ML/KG/HR: 3.68; 3.05; 6.34; 3.09; .314; .187 INJECTION INTRAVENOUS at 16:01

## 2023-10-02 RX ADMIN — CEFEPIME HYDROCHLORIDE 2 G: 2 INJECTION, POWDER, FOR SOLUTION INTRAVENOUS at 10:04

## 2023-10-02 RX ADMIN — DEXMEDETOMIDINE HYDROCHLORIDE 0.6 MCG/KG/HR: 400 INJECTION INTRAVENOUS at 00:45

## 2023-10-02 RX ADMIN — DEXMEDETOMIDINE HYDROCHLORIDE 0.5 MCG/KG/HR: 400 INJECTION INTRAVENOUS at 10:11

## 2023-10-02 RX ADMIN — Medication 10 ML: at 07:43

## 2023-10-02 RX ADMIN — PHENYLEPHRINE HYDROCHLORIDE 50 MCG: 10 INJECTION INTRAVENOUS at 11:35

## 2023-10-02 RX ADMIN — FENTANYL CITRATE 100 MCG: 50 INJECTION, SOLUTION INTRAMUSCULAR; INTRAVENOUS at 11:09

## 2023-10-02 RX ADMIN — PHENYLEPHRINE HYDROCHLORIDE 0.5 MCG/KG/MIN: 10 INJECTION INTRAVENOUS at 12:20

## 2023-10-02 RX ADMIN — SODIUM CHLORIDE, POTASSIUM CHLORIDE, SODIUM LACTATE AND CALCIUM CHLORIDE: 600; 310; 30; 20 INJECTION, SOLUTION INTRAVENOUS at 10:30

## 2023-10-02 RX ADMIN — CEFEPIME HYDROCHLORIDE 2 G: 2 INJECTION, POWDER, FOR SOLUTION INTRAVENOUS at 00:50

## 2023-10-02 RX ADMIN — INSULIN ASPART 1 UNITS: 100 INJECTION, SOLUTION INTRAVENOUS; SUBCUTANEOUS at 20:07

## 2023-10-02 RX ADMIN — Medication 40 MG: at 07:43

## 2023-10-02 RX ADMIN — PHENYLEPHRINE HYDROCHLORIDE 100 MCG: 10 INJECTION INTRAVENOUS at 10:57

## 2023-10-02 RX ADMIN — CEFEPIME HYDROCHLORIDE 2 G: 2 INJECTION, POWDER, FOR SOLUTION INTRAVENOUS at 16:30

## 2023-10-02 RX ADMIN — CALCIUM GLUCONATE 2 G: 20 INJECTION, SOLUTION INTRAVENOUS at 18:59

## 2023-10-02 RX ADMIN — ACETAMINOPHEN 650 MG: 325 SOLUTION ORAL at 20:19

## 2023-10-02 RX ADMIN — PHENYLEPHRINE HYDROCHLORIDE 100 MCG: 10 INJECTION INTRAVENOUS at 12:05

## 2023-10-02 RX ADMIN — INSULIN ASPART 1 UNITS: 100 INJECTION, SOLUTION INTRAVENOUS; SUBCUTANEOUS at 04:15

## 2023-10-02 ASSESSMENT — ACTIVITIES OF DAILY LIVING (ADL)
ADLS_ACUITY_SCORE: 47
ADLS_ACUITY_SCORE: 43
ADLS_ACUITY_SCORE: 43
ADLS_ACUITY_SCORE: 47
ADLS_ACUITY_SCORE: 43
ADLS_ACUITY_SCORE: 43
ADLS_ACUITY_SCORE: 47
ADLS_ACUITY_SCORE: 47

## 2023-10-02 NOTE — BRIEF OP NOTE
[facility]    Brief Operative Note    Pre-operative diagnosis: Ruptured abdominal aortic aneurysm (AAA) (H) [I71.30]  Post-operative diagnosis Same as pre-operative diagnosis    Procedure: Exploratory laparotomy, abominal closure, wound vac change left lower extremity, N/A - Abdomen    Surgeon: Surgeon(s) and Role:     * Jonathan Fry MD - Primary     * Ada Donald MD - Resident - Assisting  Anesthesia: General   Estimated Blood Loss: 75 mL from 10/2/2023 10:29 AM to 10/2/2023  1:25 PM      Drains: None  Specimens: * No specimens in log *  Findings:   Healthy appearing bowel.  Fascia of abdomen able to be closed completely, subcutaneous tissue left open and wound vac placed. Ischemic appearing muscle in LLE.   Complications: None.  Implants: * No implants in log *

## 2023-10-02 NOTE — ANESTHESIA CARE TRANSFER NOTE
Patient: Alfredo Burnham    Procedure: Procedure(s):  Exploratory laparotomy, abominal closure, wound vac change left lower extremity       Diagnosis: Ruptured abdominal aortic aneurysm (AAA) (H) [I71.30]  Diagnosis Additional Information: No value filed.    Anesthesia Type:   General     Note:    Oropharynx: endotracheal tube in place and ventilatory support  Level of Consciousness: iatrogenic sedation    Level of Supplemental Oxygen (L/min / FiO2): 60  Independent Airway: airway patency not satisfactory and stable  Dentition: dentition unchanged  Vital Signs Stable: post-procedure vital signs reviewed and stable  Report to RN Given: handoff report given  Patient transferred to: ICU    ICU Handoff: Call for PAUSE to initiate/utilize ICU HANDOFF, Identified Patient, Identified Responsible Provider, Reviewed the Pertinent Medical History, Discussed Surgical Course, Reviewed Intra-OP Anesthesia Management and Issues during Anesthesia, Set Expectations for Post Procedure Period and Allowed Opportunity for Questions and Acknowledgement of Understanding      Vitals:  Vitals Value Taken Time   BP     Temp 37.4  C (99.32  F) 10/02/23 1357   Pulse 101 10/02/23 1357   Resp 17 10/02/23 1357   SpO2 90 % 10/02/23 1357   Vitals shown include unvalidated device data.    Electronically Signed By: ALTAGRACIA Guzmán CRNA  October 2, 2023  1:58 PM

## 2023-10-02 NOTE — PLAN OF CARE
Major Shift Events:    OR for washout and closure from 7172-3712. Exchanged wound vac on LLE as well.   Blind, peripheral vision only, follows commands after multiple prompts. Inconsistent on answering questions.  Large amounts of ectopy, frequent runs of quadgemini and bigemini, symptomatic pressures when in these runs. Systolics in 140s otherwise. Tachy, HR 110s. T max 100.9  Pressure support 6/8, tolerating well. Tachypneic when more awake in high 20s, high 10s when pain is more managed. Thick secretions  CRRT, goal of (-50)/ hour, ramping up rate to determine tolerance to achieve goal. Positive over a liter post OR.  Plan: Future BKA of LLE per vascular  For vital signs and complete assessments, please see documentation flowsheets.

## 2023-10-02 NOTE — PROGRESS NOTES
Vascular Surgery Progress Note  10/02/2023       Subjective:  Febrile yesterday to 101 - started on broad spectrum abx, MRSA swab sent, negative. Continues on CRRT. Transitioned to precedex. Aparicio removed.      Objective:  Temp:  [99.5  F (37.5  C)-101.1  F (38.4  C)] 100.8  F (38.2  C)  Pulse:  [] 99  Resp:  [12-32] 17  BP: (110-125)/(62) 110/62  MAP:  [68 mmHg-125 mmHg] 89 mmHg  Arterial Line BP: (106-192)/(47-91) 138/65  FiO2 (%):  [40 %] 40 %  SpO2:  [93 %-100 %] 99 %    I/O last 3 completed shifts:  In: 3271.83 [I.V.:1488.83; NG/GT:210]  Out: 3456.7 [Urine:10; Drains:1500; Other:1946.7]      Gen: intubated, sedated, opens eyes to voice  CV: RR per radial pulse, off pressors, sinus tach per tele with PACs  Resp: intubated, on ventilator  Abd: wound vac in place, holding seal, abdomen soft to palpation around VAC.    Ext: RLE wwp. LLE cool to touch,dusky, some redness appreciated on dorsal aspect of foot. Blistering appreciated on proximal calf posteriorly. no DP/PT signals.     UOP: None since removal of aparicio  Temp abd closure output: approximately 25-50cc/hr       Labs:  Recent Labs   Lab 10/02/23  0408 10/02/23  0305 10/01/23  1954   WBC 7.5 7.4 9.0   HGB 7.8* 7.4* 7.7*   PLT 33* 38* 41*         Recent Labs   Lab 10/02/23  0757 10/02/23  0411 10/02/23  0408 10/02/23  0305 10/01/23  1959 10/01/23  1954 10/01/23  1159 10/01/23  1155   NA  --   --   --  139  139  --  140  --  139   POTASSIUM  --   --   --  4.0  4.0  --  4.0  --  4.1   CHLORIDE  --   --   --  107  107  --  107  --  107   CO2  --   --   --  18*  18*  --  19*  --  19*   BUN  --   --   --  50.2*  50.2*  --  52.9*  --  55.8*   CR  --   --   --  2.71*  2.71*  --  2.93*  --  3.32*   * 143*  --  155*  155*   < > 146*   < > 170*   OMAR  --   --   --  7.5*  7.5*  --  7.5*  --  7.7*   MAG  --   --   --  2.3  --  2.2  --  2.2   PHOS  --   --  3.7 3.7  --  3.5  --  3.1    < > = values in this interval not displayed.         Imaging:           Assessment/Plan:   68 year old male with PMH of HTN and previous TIA who presented with R sided abdominal pain found to have contained ruptured AAA, now s/p open AAA repair 9/24 into 9/25am with 30 min supraceliac clamp time, prolonged inter-renal clamp time, temporary abdominal closure. He remains critically ill in the ICU. He did have bloody stool postop with flex sig 9/25 demonstrating ischemic mucosal changes of the rectum, however on repeated abdominal looks he has had no evidence of transmural necrosis of the small or large intestine, or the rectum. 9/29 he was started on CRRT given ongoing low UOP and rising Cr and failure of lasix challenge. Hemodynamically he continues to do well off pressors with some lower pressures with volume pulling from CRRT, goals on CRRT currently I=O, have not been able to pull large volumes.  Ongoing absent signals with ischemic LLE, note DVT in this leg, this is to be expected given ischemia and absent inflow. We will continue to monitor LLE given it is not currently making patient systemically ill and there is no indication for urgent intervention. Plan for OR today for washout abdomen, partial closure, LLE wound vac change.      - Will plan for RTOR today 10/2 for washout abdomen, possible closure  - Pain/Sedation per ICU, appreciate sedation vacations to assess neuro status  - Wean vent as able, continue vent support  - Goal SBP <160, MAP >65  - Appreciate flotract monitoring  - TF held in anticipation of OR  - Plan for general surgery consult this week for consideration and assistance with abdominal closure, anticipate patient may need mesh closure. Discussed with general surgery team and they are aware of patient  - Appreciate nephrology consult and recommendations  - Continue CRRT at this time  - Sputum culture with 1+ aspergillus and 3+ candida, started on voriconazole empirically  - Agree with ID consult  - Blood cx, urine cx, sputum cx sent 10/1 given fever,  NGTD  - Started on vanc/cefepime/flagyl - MRSA negative rec stop vanc today.   - Plts continue to drift down with low today at 33, 4T score of 3. Low HIT probability. Holding SQH today, appreciate ICU starting steroids   -There is DVT of LLE popliteal vein and we expect there to be  thrombosis from stasis in the tibial and popliteal veins as this is the level the patient is ischemic. No indication to anticoagulate for this.     Seen and Discussed with vascular surgery staff, Dr. Kandace Mcleod who is in agreement with the above.    Will Donald MD   Vascualr Surgery PGY3

## 2023-10-02 NOTE — PROGRESS NOTES
Nephrology Progress Note  10/02/2023       Alfredo Burnham is a 68 yom with complex hx of TIA, HTN, blindness who presented to [facility] 9/24 with severe abdominal pain radiating to flank and back, CT revealed AAA with a contained rupture.  Taken to OR for aortobiiliac bypass and resection of ruptured pararenal aneurysm.   Post op course complicated by hypotension requiring pressors and metabolic acidosis.  Baseline Cr 1.0 on presentation but on the rise to >5 at time of renal consult for MAYA management and possible RRT.       Interval History :   Mr Burnham continues on CRRT with 4k baths, able to be negative 0.7L yesterday, ordered for 50cc/h net negative today but will have significant down-time with trip to OR for washout and possible closure.  Likely will need multiple RTOR this week.  UOP remains minimal, continuing to support BP's and eventually hopeful for renal recovery once acute issues improve.          Assessment & Recommendations:   MAYA-Baseline Cr 1.0, ordered UA.  CT showed benign cyst but otherwise normal kidneys.  Cr on the rise since surgery, did receive contrast for CTA.  Urine microscopy showed granular casts suggesting ATN which will recover with time and stabilizing hemodynamics.  Started on CRRT 9/30 for volume and clearance with minimal UOP and rising Cr.                  -Started CRRT 9/30 for volume and clearance.  4k baths, 50cc/h net negative.                   -Dialysis consent signed and scanned into media tab.      Volume-Total body volume overloaded but much of it is likely 3rd spaced with low albumin and acute illness.  Net negative 0.7L yesterday, trying to be 1L net negative today with the caveat that he will have significant down-time in OR today, goal 50cc/h net negative.       Electrolytes-K 4.0, bicarb 18 with borderline AG, will follow with CRRT, Na 139.   ABG reasonable at 7.42/34/110/22.       BMD-Ca 7.5, Mg and Phos WNL.       Anemia-Hgb 7.8, ~14 on presentation, acute  "management per team.       Nutrition-On TPN started 9/27     Time spent: 40 minutes on this date of encounter for chart review, physical exam, medical decision making and co-ordination of care.      Seen and discussed with Dr Oconnor     Recommendations were communicated to primary team via verbal communication.        ALTAGRACIA Jiménez CNS  Clinical Nurse Specialist  [phone]    Review of Systems:   I reviewed the following systems:  ROS not done due to vent/sedation.     Physical Exam:   I/O last 3 completed shifts:  In: 3271.83 [I.V.:1488.83; NG/GT:210]  Out: 3456.7 [Urine:10; Drains:1500; Other:1946.7]   /62 (BP Location: Right arm)   Pulse 97   Temp (!) 100.9  F (38.3  C)   Resp 18   Ht 1.727 m (5' 8\")   Wt 95 kg (209 lb 7 oz)   SpO2 98%   BMI 31.84 kg/m       GENERAL APPEARANCE: Intubated and sedated.    EYES: No scleral icterus  Pulmonary: On vent 40%/8 of PEEP  CV: Regular rhythm, normal rate   - Edema +3 generalized.    GI: distended, nontender  MS: no evidence of inflammation in joints, no muscle tenderness  : + Lu  SKIN: no rash, warm, dry  NEURO: Intubated and sedated.         Labs:   All labs reviewed by me  Electrolytes/Renal -   Recent Labs   Lab Test 10/02/23  0757 10/02/23  0411 10/02/23  0408 10/02/23  0305 10/01/23  1959 10/01/23  1954 10/01/23  1159 10/01/23  1155   NA  --   --   --  139  139  --  140  --  139   POTASSIUM  --   --   --  4.0  4.0  --  4.0  --  4.1   CHLORIDE  --   --   --  107  107  --  107  --  107   CO2  --   --   --  18*  18*  --  19*  --  19*   BUN  --   --   --  50.2*  50.2*  --  52.9*  --  55.8*   CR  --   --   --  2.71*  2.71*  --  2.93*  --  3.32*   * 143*  --  155*  155*   < > 146*   < > 170*   OMAR  --   --   --  7.5*  7.5*  --  7.5*  --  7.7*   MAG  --   --   --  2.3  --  2.2  --  2.2   PHOS  --   --  3.7 3.7  --  3.5  --  3.1    < > = values in this interval not displayed.       CBC -   Recent Labs   Lab Test 10/02/23  0408 " 10/02/23  0305 10/01/23  1954   WBC 7.5 7.4 9.0   HGB 7.8* 7.4* 7.7*   PLT 33* 38* 41*       LFTs -   Recent Labs   Lab Test 10/02/23  0305 10/01/23  1954 10/01/23  1155 10/01/23  0344 09/30/23  1931 09/30/23  1003   ALKPHOS 127  --   --  103  --  75   BILITOTAL 1.8*  --   --  1.6*  --  1.4*   ALT 25  --   --  26  --  30   AST 66*  --   --  88*  --  125*   PROTTOTAL 4.3*  --   --  4.1*  --  3.9*   ALBUMIN 2.5*  2.5* 2.5* 2.5* 2.0*  2.0*   < > 2.1*    < > = values in this interval not displayed.       Iron Panel - No lab results found.        Current Medications:   [Auto Hold] B and C vitamin Complex with folic acid  10 mL Per Feeding Tube Daily    [Auto Hold] ceFEPIme  2 g Intravenous Q8H    [Auto Hold] chlorhexidine  15 mL Mouth/Throat Q12H    [Held by provider] heparin ANTICOAGULANT  5,000 Units Subcutaneous Q8H    [Auto Hold] hydrocortisone sodium succinate PF  50 mg Intravenous Q6H COLUMBA    [Auto Hold] insulin aspart  1-6 Units Subcutaneous Q4H    [Auto Hold] lipids plant base  250 mL Intravenous Q24H    [Auto Hold] melatonin  5 mg Oral or Feeding Tube QPM    [Auto Hold] metroNIDAZOLE  500 mg Intravenous Q12H    [Auto Hold] pantoprazole  40 mg Per Feeding Tube QAM AC    Or    [Auto Hold] pantoprazole  40 mg Intravenous QAM AC    [Held by provider] polyethylene glycol  17 g Oral or Feeding Tube Daily    [Held by provider] senna-docusate  1 tablet Oral or Feeding Tube BID    [Auto Hold] sodium chloride (PF)  3 mL Intracatheter Q8H    [Auto Hold] thiamine  100 mg Oral or Feeding Tube BID    [Auto Hold] voriconazole  300 mg Oral Q12H COLUMBA (08/20)      dexmedeTOMIDine 0.5 mcg/kg/hr (10/02/23 1110)    dextrose      dextrose      dextrose      CRRT replacement solution 12.5 mL/kg/hr (10/02/23 0753)    fentaNYL 100 mcg/hr (10/02/23 1020)    - MEDICATION INSTRUCTIONS -      norepinephrine Stopped (09/29/23 8708)    parenteral nutrition - ADULT compounded formula      parenteral nutrition - ADULT compounded formula 45  mL/hr (10/02/23 1020)    CRRT replacement solution 200 mL/hr at 10/01/23 1441    CRRT replacement solution 12.5 mL/kg/hr (10/02/23 0758)

## 2023-10-02 NOTE — PLAN OF CARE
Goal Outcome Evaluation:    D/I:      Pulm: 40 % FiO2 on vent, lungs clear, Small creamy secretions. Overbreathing vent and wanting to pull higher tidal volumes.       CV:  SR with frequent PVC's and rare bigeminy. MAPs > than 65 and labile. RUE, LUE, RLE, pulses palpable, continue to be absent on LLE. Edema in torso to feet, scrotum very edematous, swelling in hands and arms.       Heme: 7.8, platelets 33 from 41. Dr. Dhaliwal updated on platelet drop given OR today and concerns about HIT. No platelets until vascular team assesses, no need to draw for Hit at this time. ,       GI: Hypoactive bowel sounds, no BM tonight. Tube feeding on hold per Dr. Dhaliwal for OR today. Needing one unit of insulin for blood glucose in 140's.      : Lu removed. Anuric, will bladder scan at 0600.      Neuro: Squeezed hands and wiggled toes to command. Does not respond to other questions.       Extremities: Edematous extremities with skin tears on right and left forearms. Blisters on upper thigh of left leg. Dressings on arms, and left leg changed. Fasciotomy sites sutured.       Skin: Dax and weeping in areas.       ID: Temp 100.8 to 100.9 and this is on CRRT. Cultures done 10/1, antibiotics started.       Pain: Fentanyl 907357/hour. No significant indications of pain. No wincing,       Activity: minimal movement of arms and right leg to stimulus.       CRRT: Net positive 50 as of 0500. Order to keep I=O if MAP> than 65     P: Plann for OR today to evaluate abdominal incision.

## 2023-10-02 NOTE — PROGRESS NOTES
STAFF NOTE:  Tube feeds held for OR today  Not pulling much in terms of CRRT    Exam notale for diffuse anasarca . 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  Now s/p multiple abdominal washouts showing a hemostatic AAA graft and no evidence of trnasmural colonic or small bowel ischemia.  Of note, also had an unsuccessfull LLE embolectomy without perfusion to the MARY JANE from the popliteal downand unsuccessful LLE embolectomy and multuiple abdominal washouts.      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  From my persoective, colonization is far more likely, but given his risk factors (TPN, steroids, broad-spectrum antibiotics), will ask ID to weigh in    We can start scheduled tylenol, add melatonin for neuroprotective effects.  Will ask ID to provide a quick comment on the likelihood of invasive aspergillus infection.  We can be more aggressive with our net negative targets on CRRT.  We can stop vancomycin today given negative MRSA swab, even in the context of intravascular grafts.  Start steroids for possible phagocytosis given ongoing thrombocytopenia.    And finally, from my perspective, I think the likelihood that a >64yo with renal failure requiring dialysis, who had a high degree of dependency at baseline given his visual acuity issues, who is now s/p open repair of abdominal aortic rupture, and who will almost certainly require above knee  amputation, has a vanishingly low likelihood of return to functional independence in activities of daily living.  Statistically, he is most likely to require prolonged if not permanent institutionalization (ie, skilled nursing cares) unless he has access to exceptional home health cares.  Cognitively, he should recover from his acute delirium adeqautely, and I do not think cognition / ability to interact with family and health aids will limit him as much as the induced debility.  Given that cognition should return, however, the family and primary team are comfortable with ongoing aggressive cares.    METABOLIC ENCEPHALOPATHY / DELIRIUM:  -due to presumed cerebrovascular disease, possible infection; monitoring with clinical exam  -melatonin for sleep and neuroprotective effects; dexmedeetomidine for sedation.  Should be able to come off fentanyl infusion while the abdomen is open - won't be particularly uncomfortable until the abdomen is closed; that said, the ischemic left foot may be painful   -using non-pharmacologic methods as much as possible, but has needed restraints to keep from pulling at lines  -apparently at baseline he has very limited vision, only peripheral only.  This is relevant insofar is he is very high risk of not rehabbing well after possible amputation - especially if he gets an above knee amputation.    ACUTE HYPOXIC RESPIRATORY FAILURE  -with PaO2 < 120 on >40% FiO2, P/F ratio is <300.  Monitoring with continuous pulse oximetry and intermittent ABGs.  -secondary to volume overload, possible pnuemonia (as ab ove, I think low likelihood of a fungal pneumonia)  -empiric antibiotics with cefepime + flagyl; ok to discontinue vancomycin  -lung protective ventilator settings with Vt <8ml/kg IBW (in this case, at least <500ml)  -add on high dose steroids for both respiratory reasons (as per CAPE-COD trial, [place] 2023) and for hematologic reasons (thrombocytopenia in the context of a low 4T score and  "vascular graft after massive hemorrhage).    -increase volume removal with CRRT today to achieve net negative goal overnight of >1L, as per paradigm established in FACTT trial, [facility] 2006.      POSSIBLE RESPIRATORY FUNGAL INFECTION:  -risk factors include TPN and now starting steroids, broad-spectrum antibiotics.  Speaking against a fungal infection are the low FiO2 requirements, lack of hemoptysis, and general lack of profound instability or SIRS response    ISCHEMIC LLE:  -s/p fasciotomy.  Has not yet fully demarkated or show signs of gangrene; I anticipate will ultimately require above knee amputation based on angiographic site of the occlusion.  As above, I believe he is an extremely poor candiate for good rehabilitation to functional independence from an AKA given poor vision, baseline debility issues, age, and renal dysfunction - all of which will be exacerbated by his recovery from a major abdominal procedure.    THROMBOCYTOPENIA:  -risk of major bleeding may be as high as 20% as per PROTECT trial (Esha, CHEST 2011), although at this point nothing specific to do.  No plans to transfuse unless platelet count <20k  -underlying etiology unclear, although decreased production unlikely (no recent cytotoxic chemo or prexisting marrow disease).  Splenic sequestration in the context of liver disease with portal hypertension seems unlikely in this patient.  -by 4T score, patient does not meet criteria to evaluate for heparin-Pf4 antibodies (HIT).  Vancomycin, beta-lactam, -induced drug-immune thrombocytopenia possible, I suppose.  Stop vancomycin and reassess platelet count tomorrow  -will monitor for thrombin-mediated platelet activation / DIC with serial INR & fibrinogen levels.  -in the context of sepsis, will add \"stress\" steroids for possible hemophagocytosis    ABDOMINAL AORTIC RUPTURE:  -s/p open repair  -to OR for probable closure today, or at least partial closure    SEVERE PROTEIN-CALORIE " MALNUTRITION:  -patient demonstrates obvious muscle wasting, especially notable in the triceps with measurably reduced hand .  Reportedly had significant weight loss in the weeks prior to admission.  -may be at risk for refeeding syndrome; watch Phos+ & Mg++  -tube feeds can restart.  He is stooling, so should be ok to increase to ~40ml/h tonight assuming residuals are fine.  Continue full dose TPN at least one more day, but I anticipate that tomorrow's bag will be the last    ACUTE KIDNEY FAILURE:  -contributing factors are massive hemorrhage, prolonged perirenal aortic clamp, possible ongoing infection  -monitoring with strict I/Os and serial Cr checks    MISC:  -Code status is full code  -family updated by primary team and medical student  -SQH will be held today for thrombocytopenia; PPI for steroids and intubation  -lines: RIJ CVC, dialysis line, arterial line  -aparicio not necessary given anuria.  Intermittent straight caths are ok  -anticipate discharge to transitional care unit in >1 week, and ultimate discharge to skilled nursing    Billing statement: 34min of critical care time; spent in an initial review of imaging, labs, physical exam, and discussion of the patient with my own team and the extended care team including the primary service. Based on this patient's presentation / recent intervention and my bedside assessment, I felt there was or is a reasonably high probability of imminent or life-threatening deterioration today or tonight for respiratory or hemodynamic reasons.   My overall critical care time, as described in detail above, includes such things as coordination of care, arrhythmia and hemodynamics management with infusions of medicines, respiratory management, fluid therapy including fluid boluses, and pain and sedation therapy. This time excludes time I spent personally performing or supervising procedures for this patient.    SHAMEKA Narvaez MD  Clinical   Anesthesia  / Critical Care  *[phone]

## 2023-10-02 NOTE — OP NOTE
PREOPERATIVE DIAGNOSIS:  Open abdomen after repair of abdominal aortic rupture  Acute critical limb ischemia of left leg  Left lower extremity deep venous thrombosis  Acute thrombocytopenia  Acute blood loss anemia  Protein calore deficit malnutrition  Acute kidney injury  Acute hypoxic respiratory failure  Mixed metabolic encephalopathy    POSTOPERATIVE DIAGNOSIS:  Open abdomen after repair of abdominal aortic rupture   Acute critical limb ischemia of left leg  Left lower extremity deep venous thrombosis  Acute thrombocytopenia  Acute blood loss anemia  Protein calore deficit malnutrition  Acute kidney injury  Acute hypoxic respiratory failure  Mixed metabolic encephalopathy    PROCEDURE:  Exploratory laparotomy  Abdominal wall closure, resulting defect size 32 x 5 x 3 cm  Wound vac replacement to left lower extremity, 10 x 5 x 1 cm    SURGEON: Jonathan Fry MD    RESIDENT: Ada Donald MD    ANESTHESIA: General    EBL: 75mL    SPECIMENS: None    COMPLICATIONS: None    FINDINGS: Starting peak airway pressures prior to abdominal closure 21 mmHg, max peak airway pressures after abdominal closure is 24 mmHg; left lower extremity with leg mottled up to upper calf, gastrocnemius muscle nonreactive to direct stimulation with electrocautery    INDICATIONS FOR PROCEDURE:Mr. Alfredo Burnham is a  68-year-old male currently with open abdomen after emergent open aortobiiliac repair for ruptured juxtarenal aneurysm last week.  He remains hospitalized in the intensive care unit intubated and sedated.  He has had multiple return trips to the operating room for abdominal washout since his original surgery.  Previously his retroperitoneum was closed.  He has now been managed with an ABThera VAC dressing.  His left lower extremity was explored and thrombectomy was performed previously.  Unfortunately, his left lower extremity remains acutely ischemic now with evidence of deep venous thrombosis.  After discussion of risks,  benefits, and alternatives, his wife agrees to proceed with exploratory laparotomy abdominal washout, possible abdominal closure, and wound VAC change to left lower extremity.    DESCRIPTION OF PROCEDURE: Mr. Burnham was brought to the operating room directly from the ICU.  He was transferred to the operating room table and given general anesthesia.  His abdomen and left lower extremity were prepped and draped in the normal sterile fashion.  A time-out was performed identifying correct patient, procedure, and laterality prior to beginning.     He had edematous small bowel, but otherwise it all appeared healthy.  His colon was pink.  2 L of warm saline irrigation were used to irrigate the abdomen.  No specific concerns were seen.  The abdominal fascia was mobilized anteriorly and posteriorly with electrocautery.  Using multiple interrupted #1 Ethibond suture, the abdomen was slowly closed.  The starting peak airway pressures were 21, and upon complete closure of abdomen, peak airway pressures were 24.  He was not on any additional pressors or paralytic agent at this time. Subcutaneous tissues were irrigated with saline. The resulting abdominal wound size was 32 x 5 x 3 cm.  A wound VAC sponge was placed.    The left lower extremity was inspected. He had mottling up to the upper calf.  The wound was washed out with warm saline solution.  Direct electrocautery stimulation was applied to the gastrocnemius, and this was nonreactive.  The muscle appeared dusky and nonviable.  The wound size measured 10 x 5 x 1 cm.  A wound VAC sponge was placed.    He tolerated the procedure well.  There were no immediate complications.  All sponge, needle, and instrument counts were correct.  A postoperative x-ray was obtained of the abdomen which did not show any retained objects.  He still may need additional return trip to the operating room for closure of his subcutaneous layers versus closure by secondary intention with wound VAC.  I anticipate return trip to the operating room in the near future for possible above-knee amputation.  He returned to the ICU intubated and sedated.  I spoke to his wife and daughters and the waiting room in the ICU following the procedure.

## 2023-10-02 NOTE — PROGRESS NOTES
CRRT STATUS NOTE    DATA:  Time:  6:17 AM  Pressures WNL:  YES  Filter Status:  WDL    Problems Reported/Alarms Noted:  none    Supplies Present:  YES    ASSESSMENT:  Patient Net Fluid Balance:  net -680 10/1; net +150 since midnight; net +24,500 since admit  Vital Signs:  vitals reviewed.  No pressors.  Temp max 101 this shift. HR , vent with FiO2 40%; O2 sats >92%.  Labs:  labs reviewed.  Lactate 1.4; platelets 33; hbg 7.8  Goals of Therapy:  0-150     INTERVENTIONS:   none    PLAN:  Fluid removal as tolerated per goals of therapy.  Restart every 72 hours and PRN.  Please notify CRRT resource RN with questions and concerns

## 2023-10-02 NOTE — PROGRESS NOTES
CLINICAL NUTRITION SERVICES - BRIEF NOTE      Reason for RD note: Resume and advance EN post op via NGT per surgery / SICU    New Findings/Chart Review:  RTOR this am - exp lap, abd closure, wound vac change LLE    Nutrition support:   - Enteral: via NGT, Pivot @ 10 ml/hr - held this am for RTOR  - Parenteral: since 9/27, dextrose reached goal 9/29: 240 g dextrose, 120 g AA, lipids added 10/1 with discontinuation of propofol    Renal: CRRT started 9/30. Protein needs reassessed @ 2.0 -2.5 g/kg - aiming for higher end with multiple OR trips and increased need for healing.    Respiratory: Intubated    GI: LBM 9/30    Labs and meds reviewed    Interventions:  - EN orders adjusted: Pivot 1.5 Juvencio (or equivalent) @ goal of  50ml/hr  (1200ml/day) + 3 pkts prosource TF20 provides: 2040 kcals (28kcal/kg), 172 g PRO (2.4g/kg), 900 ml free H20, 206 g CHO, and 9 g fiber daily.   Resume EN @ 20 ml/hr, advance by 10ml q 8 hours to goal    - Collaboration with providers: Discussed with PharmD.  Plan to discontinue lipids with EN resumption / advancment    Future/Additional Recommendations:  Ok to Discontinue TPN (let bag run out) 10/3 if TF tolerated @ at least 30ml/hr    Nutrition will continue to follow per protocol.    Natty Zeng, RD, LD, CNSC  [address] pager: [phone]  Ascom: [id number]  Weekend/Holiday RD pager [phone]

## 2023-10-02 NOTE — PROGRESS NOTES
SURGICAL ICU PROGRESS NOTE  10/02/2023        Date of Service (when I saw the patient): 10/02/2023    ASSESSMENT:  Alfredo Burnham is a 68 year old male who was admitted to the SICU on 9/24/2023 s/p open AAA repair. Patient has a history of HTN and previous TIA. He presented to the ED on 9/24 with right sided abdominal pain and was found to have a contained, ruptured AAA on CT. 30 min super-celiac clamp time. Prolonged intra-renal clamp. 9/25 s/p flex sig showing rectal ischemia, abdominal washout showing a hemostatic AAA graft and no evidence of transmural colonic or small bowel ischemia, and an unsuccessful LLE embolectomy. 9/26 s/p repeat abdominal washout, retroperitoneal closure, LLE wound washout/closure. 9/29 s/p repeat abd washout, bowel appeared well perfused w/o notable ischemia.      CHANGES and MAJOR THINGS TODAY:   - Returning to OR with vascular today (10/2) for abdominal washout and possible closure  - Consult ID to discuss voriconazole  - Discuss CRRT goal with nephrology (goal net negative -25 to -50/hr)  - Wean fentanyl as tolerated  - Schedule tylenol  - Schedule melatonin at 6PM  - Hold subcutaneous heparin  - Start hydrocortisone 50mg q6  - Discuss with CRS about re-starting trickle feeds after OR  - Discontinue vancomycin given negative MRSA nares    PLAN:    Neurological:  # Acute pain   # Mixed metabolic encephalopathy   - Monitor neurological status. Delirium preventions and precautions  - Sedation plan: Precedex gtt (@5)  - RASS goal 0 to -1  - Pain: fentanyl gtt (@125); wean as tolerated  - Schedule tylenol  - melatonin q6pm    Pulmonary:  # Acute hypoxic respiratory failure  - VENT: /16/8/40%  - PST daily. Adequate mechanics and RSBI  - Hold on extubation given encephalopathy, need for airway protection  - Ventilatory bundle. Head of bed elevation   - CXR 10/2: L lower hazy opacities, favored to represent atelectasis  - Voriconazole 300 mg BID for BAL culture growth, pending  ID consult     Cardiovascular:    # Contained AAA rupture s/p open repair  # Suspected emboli to LLE  #Hx of HTN  #Sinus tachycardia  #Ischemic LLE  --> 9/30 Concern for pulmonary embolus by primary team noted in the setting of sinus tachycardia.   - Bedside ECHO 9/30: TAPSE 1.8 cm. RVSP 14, normal chamber sizes, no RV dilation. Cannot estimate RAP given inability to visualize IVC given abthera device. Normal LV function. No obvious valvulopathies. Repeat POCUS today: TAPSE 1.93 cm, IVC visualized 1.3 cm with > 50% variation (while on PPV), RV chamber small, RVSP 6, estimated RAP 3. MAPSE normal.  - Monitor hemodynamic status.   - Ischemic LLL- no pulse signals. Intra op evaluation 9/29 with viable muscle. Noted DVT expected in the setting of ischemia and absent inflow.   - Goal MAP >65, SBP <160  - CVPs 11-12 over last 24 hrs   - Bigeminy/quadgeminy noted overnight by nursing staff; pressures drop to ~MAP=60 during these episodes    GI/Nutrition:    # Protein calorie deficit malnutrition  #s/p open AAA repair, abthera device in place  # Post-operative melena  # Rectal ischemia  - PPI for ppx  - TPN. Continue trophic feeds  - Abthera device in place.  - Returning to OR with vascular today (10/2) for abdominal washout and possible closure. General surg consult this week for possible assistance with closure   - Feeds stopped overnight; resume feeds post-op, plan to ramp up from trickle feeds pending OR course and colorectal surgery discussion    Renal/Fluids/Electrolytes:  # MAYA  - Total volume up with third spacing; weight 23 kg up since admission  - Access: Right femoral temporary dialysis catheter  - Failed response to diuresis 9/30  - Started on CRRT 9/30 for volume management; switch CRRT from I=O to pulling 25-50mL/hr    Endocrine:  # Stress hyperglycemia  - Q6 BG checks, medium sliding scale available.     Infectious disease:   #Fever  #Positive aspergillus in respiratory tract  - T max 100.9 with CRRT heater  OFF.   - Lu out  - 9/28 BAL with 3+ candida albicans, +1 aspergillus fumigatus  - Empirically treating with Metronidazole and Cefepime given prior concerns of intestinal ischemia. 9/29 intra op report reviewed, healthy appearing SB, colon without evidence of ischemia.  - Discontinue van given negative MRSA nares  - Voriconazole 300 mg BID, pending ID consult recs    Hematology:    # Acute blood loss anemia  # Thrombocytopenia, multifactorial  # LLE DVT  Admission platelet count 94K  - 9/30 LLE DVT noted, again expected given extremity ischemia and absence of inflow. No indication to treat with therapeutic anticoagulation.   - Platelets at 33, continually dropping. 4T score: 3 points (0/1/2/0). Low probability for HIT. Thrombocytopenia likely multifactorial given acute blood loss, critical illness  - Holding subcutaneous heparin  - Hemoglobin stable at 7.8  - Starting hydrocortisone 50mg q6 for hemophagocytosis    Musculoskeletal:  # Weakness and deconditioning of critical illness  # Left lower limb ischemia   - Physical and occupational therapy consult when appropriate. Passive ROM at bedside with RN    General Cares/Prophylaxis:    DVT Prophylaxis: Pneumatic Compression Devices; subcutaneous heparin (held)  GI Prophylaxis: PPI  Restraints: Restraints for medical healing needed: YES     Lines/ tubes/ drains:  - ETT  - Lu- dc  - Abthera  - PIV x3  - RIJ MAC  - R radial arterial line  - R FV dialysis catheter     Disposition:  - Surgical ICU     Patient seen, findings and plan discussed with surgical ICU staff, Dr. Narvaez.    Jenni Mora, MS4  Surgical ICU    ====================================  INTERVAL HISTORY:   No acute events overnight. Back to OR with vascular today. Can follow commands to squeeze hands and wiggle toes.     ROS unable to be performed due to sedation and intubation.      OBJECTIVE:   1. VITAL SIGNS:   Temp:  [99.5  F (37.5  C)-101.1  F (38.4  C)] 100.8  F (38.2  C)  Pulse:  []  90  Resp:  [10-32] 14  BP: (110-125)/(62) 110/62  MAP:  [66 mmHg-125 mmHg] 82 mmHg  Arterial Line BP: ()/(47-91) 126/61  FiO2 (%):  [40 %] 40 %  SpO2:  [93 %-100 %] 98 %  Vent Mode: CMV/AC  (Continuous Mandatory Ventilation/ Assist Control)  FiO2 (%): 40 %  Resp Rate (Set): 16 breaths/min  Tidal Volume (Set, mL): 450 mL  PEEP (cm H2O): 8 cmH2O  Pressure Support (cm H2O): 5 cmH2O  Resp: 14      2. INTAKE/ OUTPUT:   I/O last 3 completed shifts:  In: 3066.89 [I.V.:1380.29; NG/GT:210]  Out: 3832.1 [Urine:39; Drains:1750; Other:2043.1]    3. PHYSICAL EXAMINATION:  General: Intubated, sedated  HEENT: Normocephalic, atraumatic. NGT to LIS, ETT  Neuro: Pupils equal and reactive, withdraws upper extremities to noxious stimulus.   Pulm/Resp: coarse lung sounds throughout, no wheezing.   CV: RRR, sinus tachycardia on monitor.   Abdomen: Open abdomen with Abthera in place, serosanguinous output, soft, moderately distended, soft  :  aparicio catheter in place, no urine seen.   MSK/Extremities: RLE warm to palpation with strong signals, edematous. LLE cool to touch, dusky, no signals distal of popliteal.     4. INVESTIGATIONS:   Arterial Blood Gases   Recent Labs   Lab 10/01/23  0348 09/30/23  1547 09/30/23  0404 09/30/23  0048   PH 7.42 7.42 7.35 7.35   PCO2 34* 29* 35 35   PO2 110* 198* 72* 65*   HCO3 22 19* 19* 19*     Complete Blood Count   Recent Labs   Lab 10/02/23  0408 10/01/23  1954 10/01/23  1155 10/01/23  0344   WBC 7.5 9.0 7.8 9.4   HGB 7.8* 7.7* 7.7* 8.0*   PLT 33* 41* 45* 59*     Basic Metabolic Panel  Recent Labs   Lab 10/02/23  0411 10/02/23  0305 10/02/23  0007 10/01/23  1959 10/01/23  1954 10/01/23  1159 10/01/23  1155 10/01/23  0802 10/01/23  0344   NA  --  139  139  --   --  140  --  139  --  140  140   POTASSIUM  --  4.0  4.0  --   --  4.0  --  4.1  --  4.0  4.0   CHLORIDE  --  107  107  --   --  107  --  107  --  108*  108*   CO2  --  18*  18*  --   --  19*  --  19*  --  19*  19*   BUN  --   50.2*  50.2*  --   --  52.9*  --  55.8*  --  62.5*  62.5*   CR  --  2.71*  2.71*  --   --  2.93*  --  3.32*  --  4.18*  4.18*   * 155*  155* 140* 144* 146*   < > 170*   < > 149*  149*    < > = values in this interval not displayed.     Liver Function Tests  Recent Labs   Lab 10/02/23  0305 10/01/23  1954 10/01/23  1155 10/01/23  0344 09/30/23  1931 09/30/23  1003 09/30/23  0403   AST 66*  --   --  88*  --  125* 133*   ALT 25  --   --  26  --  30 37   ALKPHOS 127  --   --  103  --  75 75   BILITOTAL 1.8*  --   --  1.6*  --  1.4* 1.4*   ALBUMIN 2.5*  2.5* 2.5* 2.5* 2.0*  2.0*   < > 2.1* 2.2*   INR 1.30*  --   --  1.28*  --  1.36* 1.35*    < > = values in this interval not displayed.     Pancreatic Enzymes  No lab results found in last 7 days.  Coagulation Profile  Recent Labs   Lab 10/02/23  0305 10/01/23  0344 09/30/23  1003 09/30/23  0403 09/30/23  0048 09/29/23 1938   INR 1.30* 1.28* 1.36* 1.35* 1.31* 1.37*   PTT  --   --  39* 37 38 37         5. RADIOLOGY:   Recent Results (from the past 24 hour(s))   XR Chest Port 1 View    Narrative    EXAM: XR CHEST PORT 1 VIEW  10/1/2023 5:40 PM     HISTORY:  new fever of unknown origin       COMPARISON:  Same-day chest radiograph 10/1/2023    FINDINGS:     AP portable supine radiograph of the chest. Endotracheal tube in mid  thoracic trachea with tip approximately 3.9 cm cephalad to the lissa.  Stable position of right IJ central venous catheter. Esophageal  temperature probe in the high cervical esophagus. Enteric tube seen  coursing below the diaphragm and out of the field of view.  Trachea is  midline. Cardiomediastinal silhouette and pulmonary vasculature are  within normal limits. Mild hazy pulmonary opacities overlying the left  lower lobe with persistent dense retrocardiac opacification. Small  bilateral pleural effusions, left greater than right. No appreciable  pneumothorax.    No acute osseous abnormality. Visualized upper abdomen is  unremarkable.         Impression    IMPRESSION:   1. Support devices in stable position.   2. Stable bibasilar hazy pulmonary opacities with dense retrocardiac  opacification, presumably related to layering bilateral small pleural  effusion, and atelectasis. However, cannot definitely exclude  underlying infection. Recommend continued attention on follow-up.    I have personally reviewed the examination and initial interpretation  and I agree with the findings.    FILOMENA WAHL MD         SYSTEM ID:  [id number]   XR Chest Port 1 View    Impression    RESIDENT PRELIMINARY INTERPRETATION  Impression:   1.  Stable support devices. Endotracheal tube tip is in the low  thoracic trachea above the level of the lissa.  2. Unchanged left basilar hazy opacity likely atelectasis.       =========================================    I saw and evaluated the patient in an independent visit and agree with the student's assessment and plan as written above. I was present at all times for any mentioned procedures.     Clint Braga MD  PGY-1, Neurosurgery  Off-service on SICU

## 2023-10-02 NOTE — PROGRESS NOTES
CRRT STATUS NOTE    DATA:  Time:  6:38 PM  Pressures WNL:  YES  Filter Status:  WDL    Problems Reported/Alarms Noted:  None    Supplies Present:  YES    ASSESSMENT:  Patient Net Fluid Balance:  + 1.1 L since m.n.  Vital Signs:  VSS  Labs:  K 4.3, Mg 2.3, Phos 4.5, iCa 4.2, lactate 1.4, WBC 10, hgb 7.5, plts 65  Goals of Therapy:  50 ml/h net negative    INTERVENTIONS:   None    PLAN:  Continue to monitor. Notify MD of changes/concerns.

## 2023-10-02 NOTE — CONSULTS
[facility]  Infectious Disease Consultation note  Today's Date: 10/02/2023    Recommendations:  1. Discontinue voriconazole  2. Can de-escalate cefepime to ceftriaxone      Alfredo Burnham is a 68 year old male with a history of HTN and TIA presenting on 9/24 with severe abdominal pain, found to have contained AAA rupture requiring emergency surgical repair. He currently remains intubated with open abdomen, and his post-op course has been complicated by multiple abdominal washouts, possible ischemic bowel, and unsuccessful LLE embolectomy. His 9/28 respiratory aerobic culture has grown 3+ Candida Albicans and 1+ Aspergillus fumigatus with a negative galactomannan. In this setting, would think that the aspergillus is likely not pathogenic and therefore does not warrant treatment with voriconazole. Additionally, covering empirically for intra-abdominal infection is reasonable given multiple procedure and critical illness, but could de-escalate cefepime to ceftriaxone as if pseudomonas were present, it likely would have already been present in some sense since admission on 9/24.      Thank you for involving Infectious Disease in the care of this patient.     Иван Díaz MD  Internal Medicine & Pediatrics PGY-1  Pager: [phone]    -------------------------------------------------------------------------------------------------------------------    Reason for consult / Chief complaint:   Consulted by Dr. Braga for respiratory culture positive for 1+ Aspergillus fumigatus.     History of presenting illness:  Alfredo Burnham is a 68 year old with a history of hypertension and previous TIA who presented to the [place] ED with severe abdominal pain on 9/24, also stating he had nausea with two bouts of emesis. He was found on CT to have a contained AAA rupture requiring emergency surgical repair.     Hospital course has been complicated by prolonged intra-renal clamping, rectal ischemia, multiple  "abdominal washouts, unsuccessful LLE embolectomy, and respiratory aerobic culture showing 1+ aspergillus fumigatus.    Social Hx:  Social History     Tobacco Use    Smoking status: Every Day     Packs/day: 0.50     Types: Cigarettes   Substance Use Topics    Alcohol use: Yes     Comment: 1-2/wk    Drug use: No         Allergies:   Allergies   Allergen Reactions    Penicillins Swelling     Facial swelling    Has tolerated cefazolin, cefepime         Medications:  No current outpatient medications on file.         Past Medical Hx:  Past Medical History:   Diagnosis Date    Hypertension 2/8/2011    Macular degeneration          Family History:  Family History   Problem Relation Age of Onset    Unknown/Adopted Mother     Heart Disease Mother     Arthritis Mother     Hypertension Brother     Blood Disease Sister     Psychotic Disorder Sister          Review of Systems:  10 systems reviewed, pertinent positives noted in my HPI      Examination:  Vital signs:   /62 (BP Location: Right arm)   Pulse 97   Temp (!) 100.9  F (38.3  C) (Esophageal)   Resp 20   Ht 1.727 m (5' 8\")   Wt 95 kg (209 lb 7 oz)   SpO2 97%   BMI 31.84 kg/m      Constitutional: Sedated, intubated.   Eyes: not pale, not jaundiced  Neck: no lymphadenopathy  CV: Regular rate and rhythm, Normal S1 and S2  RESP: Diffusely coarse throughout on ventilator, no wheezing appreciated.   Abdomen: Distended  Skin: No rashes on exposed skin  Extremities: RLE warm and well perfused. LLE cool to touch, dusky with intermittent dark discoloration.      Laboratory:  Hematology:  Recent Labs   Lab Test 10/02/23  0408 10/02/23  0305 10/01/23  1954 10/01/23  1155 10/01/23  0344 09/30/23  1003   WBC 7.5 7.4 9.0 7.8 9.4 8.0   HGB 7.8* 7.4* 7.7* 7.7* 8.0* 8.2*   HCT 23.5* 22.4* 23.0* 23.1* 24.0* 25.0*   PLT 33* 38* 41* 45* 59* 64*       Chemistry:  Recent Labs   Lab Test 10/02/23  0757 10/02/23  0411 10/02/23  0408 10/02/23  0305 10/02/23  0007 10/01/23  1959 " 10/01/23  1954 10/01/23  1159 10/01/23  1155 10/01/23  0802 10/01/23  0344 09/30/23  2218 09/30/23  1931 09/30/23  1647 09/30/23  1546 09/30/23  1316 09/30/23  1003 09/29/23  1211 09/29/23  1210 09/25/23  0457 09/25/23  0344   NA  --   --   --  139  139  --   --  140  --  139  --  140  140  --  141  --   --   --  141   < > 140   < > 139   POTASSIUM  --   --   --  4.0  4.0  --   --  4.0  --  4.1  --  4.0  4.0  --  4.0  --  3.9  --  3.4   < > 4.0   < > 4.3   CHLORIDE  --   --   --  107  107  --   --  107  --  107  --  108*  108*  --  109*  --   --   --  110*   < > 109*   < > 101   CO2  --   --   --  18*  18*  --   --  19*  --  19*  --  19*  19*  --  19*  --   --   --  15*   < > 18*   < > 14*   ANIONGAP  --   --   --  14  14  --   --  14  --  13  --  13  13  --  13  --   --   --  16*   < > 13   < > 24*   BUN  --   --   --  50.2*  50.2*  --   --  52.9*  --  55.8*  --  62.5*  62.5*  --  72.4*  --   --   --  74.5*   < > 57.0*   < > 14.1   CR  --   --   --  2.71*  2.71*  --   --  2.93*  --  3.32*  --  4.18*  4.18*  --  5.22*  --   --   --  6.18*   < > 5.77*   < > 1.08   GFRESTIMATED  --   --   --  24*  24*  --   --  22*  --  19*  --  15*  15*  --  11*  --   --   --  9*   < > 10*   < > 74   * 143*  --  155*  155* 140*   < > 146*   < > 170*   < > 149*  149*   < > 140*   < >  --    < > 145*   < > 119*   < > 140*   A1C  --   --   --   --   --   --   --   --   --   --   --   --   --   --   --   --   --   --   --   --  5.7*   OMAR  --   --   --  7.5*  7.5*  --   --  7.5*  --  7.7*  --  8.0*  8.0*  --  7.8*  --   --   --  7.2*   < > 7.8*   < > 11.2*   PHOS  --   --  3.7 3.7  --   --  3.5  --  3.1  --  3.3   < > 2.8  --  1.9*  --  1.7*   < > 3.1   < > 4.9*   MAG  --   --   --  2.3  --   --  2.2  --  2.2  --  2.1  --  2.0  --   --   --  1.8   < > 2.0   < > 2.3   LACT  --   --   --  1.4  --   --  1.9  --  1.6   < > 1.3  --  1.3  --   --   --  1.4   < >  --    < > 11.2*   CKT  --   --   --   --   --   --    --   --   --   --   --   --   --   --   --   --   --   --  1,503*  --   --     < > = values in this interval not displayed.       Liver Function Studies:  Recent Labs   Lab Test 10/02/23  0305 10/01/23  1954 10/01/23  1155 10/01/23  0344 09/30/23 1931 09/30/23  1003 09/30/23  0403 09/30/23  0048 09/29/23 1938   BILITOTAL 1.8*  --   --  1.6*  --  1.4* 1.4* 1.5* 1.4*   ALKPHOS 127  --   --  103  --  75 75 76 64   ALBUMIN 2.5*  2.5* 2.5* 2.5* 2.0*  2.0* 2.3* 2.1* 2.2* 2.5* 2.4*   AST 66*  --   --  88*  --  125* 133* 135* 136*   ALT 25  --   --  26  --  30 37 36 37         Microbiology:  9/28 Resp Aerobic culture: 3+ Candida Albicans, 1+ Aspergillus fumigatus. 3+ normal hakan, >25 PMNs. 2+ mixed hakan  9/30 Aspergillus galactomannan antigen: in process  10/1 Blood cultures: NG1D  10/1 Resp Aerobic culture: In process, >25 PMNs, 3+ mixed hakan      Imaging:  10/2 Chest Xray:   1.  Stable support devices. Endotracheal tube tip is in the low  thoracic trachea, 2.4 cm above the lissa.  2. Unchanged left basilar hazy opacity likely atelectasis with a  stable small left pleural effusion.

## 2023-10-03 ENCOUNTER — APPOINTMENT (OUTPATIENT)
Dept: GENERAL RADIOLOGY | Facility: CLINIC | Age: 68
DRG: 268 | End: 2023-10-03
Attending: SURGERY
Payer: COMMERCIAL

## 2023-10-03 LAB
ALBUMIN SERPL BCG-MCNC: 2.5 G/DL (ref 3.5–5.2)
ALBUMIN SERPL BCG-MCNC: 2.5 G/DL (ref 3.5–5.2)
ALBUMIN SERPL BCG-MCNC: 2.6 G/DL (ref 3.5–5.2)
ALBUMIN SERPL BCG-MCNC: 2.6 G/DL (ref 3.5–5.2)
ALLEN'S TEST: ABNORMAL
ALP SERPL-CCNC: 142 U/L (ref 40–129)
ALT SERPL W P-5'-P-CCNC: 21 U/L (ref 0–70)
ANION GAP SERPL CALCULATED.3IONS-SCNC: 14 MMOL/L (ref 7–15)
ANION GAP SERPL CALCULATED.3IONS-SCNC: 15 MMOL/L (ref 7–15)
AST SERPL W P-5'-P-CCNC: 64 U/L (ref 0–45)
BACTERIA SPT CULT: ABNORMAL
BASE EXCESS BLDA CALC-SCNC: -3.1 MMOL/L (ref -9–1.8)
BILIRUB DIRECT SERPL-MCNC: 0.78 MG/DL (ref 0–0.3)
BILIRUB SERPL-MCNC: 1.1 MG/DL
BUN SERPL-MCNC: 58.8 MG/DL (ref 8–23)
BUN SERPL-MCNC: 58.8 MG/DL (ref 8–23)
BUN SERPL-MCNC: 61.5 MG/DL (ref 8–23)
BUN SERPL-MCNC: 66.6 MG/DL (ref 8–23)
CA-I BLD-MCNC: 4.3 MG/DL (ref 4.4–5.2)
CA-I BLD-MCNC: 4.4 MG/DL (ref 4.4–5.2)
CA-I BLD-MCNC: 4.4 MG/DL (ref 4.4–5.2)
CALCIUM SERPL-MCNC: 7.7 MG/DL (ref 8.8–10.2)
CALCIUM SERPL-MCNC: 7.8 MG/DL (ref 8.8–10.2)
CHLORIDE SERPL-SCNC: 105 MMOL/L (ref 98–107)
CHLORIDE SERPL-SCNC: 105 MMOL/L (ref 98–107)
CHLORIDE SERPL-SCNC: 106 MMOL/L (ref 98–107)
CHLORIDE SERPL-SCNC: 106 MMOL/L (ref 98–107)
CREAT SERPL-MCNC: 1.94 MG/DL (ref 0.67–1.17)
CREAT SERPL-MCNC: 2.09 MG/DL (ref 0.67–1.17)
CREAT SERPL-MCNC: 2.26 MG/DL (ref 0.67–1.17)
CREAT SERPL-MCNC: 2.26 MG/DL (ref 0.67–1.17)
DEPRECATED HCO3 PLAS-SCNC: 18 MMOL/L (ref 22–29)
DEPRECATED HCO3 PLAS-SCNC: 19 MMOL/L (ref 22–29)
EGFRCR SERPLBLD CKD-EPI 2021: 30 ML/MIN/1.73M2
EGFRCR SERPLBLD CKD-EPI 2021: 30 ML/MIN/1.73M2
EGFRCR SERPLBLD CKD-EPI 2021: 33 ML/MIN/1.73M2
EGFRCR SERPLBLD CKD-EPI 2021: 37 ML/MIN/1.73M2
ERYTHROCYTE [DISTWIDTH] IN BLOOD BY AUTOMATED COUNT: 18.1 % (ref 10–15)
ERYTHROCYTE [DISTWIDTH] IN BLOOD BY AUTOMATED COUNT: 18.2 % (ref 10–15)
ERYTHROCYTE [DISTWIDTH] IN BLOOD BY AUTOMATED COUNT: 18.2 % (ref 10–15)
GLUCOSE BLDC GLUCOMTR-MCNC: 110 MG/DL (ref 70–99)
GLUCOSE BLDC GLUCOMTR-MCNC: 177 MG/DL (ref 70–99)
GLUCOSE BLDC GLUCOMTR-MCNC: 188 MG/DL (ref 70–99)
GLUCOSE BLDC GLUCOMTR-MCNC: 201 MG/DL (ref 70–99)
GLUCOSE BLDC GLUCOMTR-MCNC: 214 MG/DL (ref 70–99)
GLUCOSE BLDC GLUCOMTR-MCNC: 221 MG/DL (ref 70–99)
GLUCOSE BLDC GLUCOMTR-MCNC: 248 MG/DL (ref 70–99)
GLUCOSE SERPL-MCNC: 214 MG/DL (ref 70–99)
GLUCOSE SERPL-MCNC: 214 MG/DL (ref 70–99)
GLUCOSE SERPL-MCNC: 234 MG/DL (ref 70–99)
GLUCOSE SERPL-MCNC: 252 MG/DL (ref 70–99)
GRAM STAIN RESULT: ABNORMAL
GRAM STAIN RESULT: ABNORMAL
HCO3 BLD-SCNC: 21 MMOL/L (ref 21–28)
HCT VFR BLD AUTO: 23.2 % (ref 40–53)
HCT VFR BLD AUTO: 23.4 % (ref 40–53)
HCT VFR BLD AUTO: 24 % (ref 40–53)
HGB BLD-MCNC: 7.5 G/DL (ref 13.3–17.7)
HGB BLD-MCNC: 7.6 G/DL (ref 13.3–17.7)
HGB BLD-MCNC: 7.7 G/DL (ref 13.3–17.7)
LACTATE SERPL-SCNC: 1.8 MMOL/L (ref 0.7–2)
LACTATE SERPL-SCNC: 1.9 MMOL/L (ref 0.7–2)
LACTATE SERPL-SCNC: 2 MMOL/L (ref 0.7–2)
MAGNESIUM SERPL-MCNC: 2.4 MG/DL (ref 1.7–2.3)
MAGNESIUM SERPL-MCNC: 2.5 MG/DL (ref 1.7–2.3)
MAGNESIUM SERPL-MCNC: 2.5 MG/DL (ref 1.7–2.3)
MCH RBC QN AUTO: 32.5 PG (ref 26.5–33)
MCH RBC QN AUTO: 32.6 PG (ref 26.5–33)
MCH RBC QN AUTO: 32.9 PG (ref 26.5–33)
MCHC RBC AUTO-ENTMCNC: 32.1 G/DL (ref 31.5–36.5)
MCHC RBC AUTO-ENTMCNC: 32.3 G/DL (ref 31.5–36.5)
MCHC RBC AUTO-ENTMCNC: 32.5 G/DL (ref 31.5–36.5)
MCV RBC AUTO: 101 FL (ref 78–100)
O2/TOTAL GAS SETTING VFR VENT: 60 %
PCO2 BLD: 31 MM HG (ref 35–45)
PH BLD: 7.44 [PH] (ref 7.35–7.45)
PHOSPHATE SERPL-MCNC: 3.8 MG/DL (ref 2.5–4.5)
PHOSPHATE SERPL-MCNC: 4.3 MG/DL (ref 2.5–4.5)
PHOSPHATE SERPL-MCNC: 4.6 MG/DL (ref 2.5–4.5)
PLATELET # BLD AUTO: 51 10E3/UL (ref 150–450)
PLATELET # BLD AUTO: 57 10E3/UL (ref 150–450)
PLATELET # BLD AUTO: 66 10E3/UL (ref 150–450)
PO2 BLD: 61 MM HG (ref 80–105)
POTASSIUM SERPL-SCNC: 4.2 MMOL/L (ref 3.4–5.3)
POTASSIUM SERPL-SCNC: 4.3 MMOL/L (ref 3.4–5.3)
POTASSIUM SERPL-SCNC: 4.6 MMOL/L (ref 3.4–5.3)
POTASSIUM SERPL-SCNC: 4.6 MMOL/L (ref 3.4–5.3)
PROT SERPL-MCNC: 5 G/DL (ref 6.4–8.3)
RBC # BLD AUTO: 2.3 10E6/UL (ref 4.4–5.9)
RBC # BLD AUTO: 2.31 10E6/UL (ref 4.4–5.9)
RBC # BLD AUTO: 2.37 10E6/UL (ref 4.4–5.9)
SODIUM SERPL-SCNC: 138 MMOL/L (ref 135–145)
WBC # BLD AUTO: 12.3 10E3/UL (ref 4–11)
WBC # BLD AUTO: 13.9 10E3/UL (ref 4–11)
WBC # BLD AUTO: 16.2 10E3/UL (ref 4–11)

## 2023-10-03 PROCEDURE — 94003 VENT MGMT INPAT SUBQ DAY: CPT

## 2023-10-03 PROCEDURE — 83605 ASSAY OF LACTIC ACID: CPT | Performed by: NURSE PRACTITIONER

## 2023-10-03 PROCEDURE — 250N000013 HC RX MED GY IP 250 OP 250 PS 637: Performed by: ANESTHESIOLOGY

## 2023-10-03 PROCEDURE — 82803 BLOOD GASES ANY COMBINATION: CPT | Performed by: STUDENT IN AN ORGANIZED HEALTH CARE EDUCATION/TRAINING PROGRAM

## 2023-10-03 PROCEDURE — 85027 COMPLETE CBC AUTOMATED: CPT | Performed by: INTERNAL MEDICINE

## 2023-10-03 PROCEDURE — 250N000013 HC RX MED GY IP 250 OP 250 PS 637: Performed by: STUDENT IN AN ORGANIZED HEALTH CARE EDUCATION/TRAINING PROGRAM

## 2023-10-03 PROCEDURE — 90947 DIALYSIS REPEATED EVAL: CPT

## 2023-10-03 PROCEDURE — 99291 CRITICAL CARE FIRST HOUR: CPT | Mod: GC | Performed by: ANESTHESIOLOGY

## 2023-10-03 PROCEDURE — 999N000157 HC STATISTIC RCP TIME EA 10 MIN

## 2023-10-03 PROCEDURE — 80069 RENAL FUNCTION PANEL: CPT | Performed by: NURSE PRACTITIONER

## 2023-10-03 PROCEDURE — 84100 ASSAY OF PHOSPHORUS: CPT | Performed by: INTERNAL MEDICINE

## 2023-10-03 PROCEDURE — 250N000011 HC RX IP 250 OP 636: Mod: JZ | Performed by: INTERNAL MEDICINE

## 2023-10-03 PROCEDURE — 82330 ASSAY OF CALCIUM: CPT | Performed by: INTERNAL MEDICINE

## 2023-10-03 PROCEDURE — 99233 SBSQ HOSP IP/OBS HIGH 50: CPT | Mod: GC | Performed by: INTERNAL MEDICINE

## 2023-10-03 PROCEDURE — 83735 ASSAY OF MAGNESIUM: CPT | Performed by: INTERNAL MEDICINE

## 2023-10-03 PROCEDURE — 999N000015 HC STATISTIC ARTERIAL MONITORING DAILY

## 2023-10-03 PROCEDURE — 200N000002 HC R&B ICU UMMC

## 2023-10-03 PROCEDURE — 250N000013 HC RX MED GY IP 250 OP 250 PS 637: Performed by: SURGERY

## 2023-10-03 PROCEDURE — 250N000009 HC RX 250: Performed by: NURSE PRACTITIONER

## 2023-10-03 PROCEDURE — 71045 X-RAY EXAM CHEST 1 VIEW: CPT

## 2023-10-03 PROCEDURE — 250N000011 HC RX IP 250 OP 636: Performed by: STUDENT IN AN ORGANIZED HEALTH CARE EDUCATION/TRAINING PROGRAM

## 2023-10-03 PROCEDURE — 71045 X-RAY EXAM CHEST 1 VIEW: CPT | Mod: 26 | Performed by: RADIOLOGY

## 2023-10-03 PROCEDURE — C9113 INJ PANTOPRAZOLE SODIUM, VIA: HCPCS | Mod: JZ | Performed by: STUDENT IN AN ORGANIZED HEALTH CARE EDUCATION/TRAINING PROGRAM

## 2023-10-03 PROCEDURE — 250N000013 HC RX MED GY IP 250 OP 250 PS 637

## 2023-10-03 PROCEDURE — 999N000253 HC STATISTIC WEANING TRIALS

## 2023-10-03 PROCEDURE — 99024 POSTOP FOLLOW-UP VISIT: CPT | Performed by: NURSE PRACTITIONER

## 2023-10-03 PROCEDURE — 250N000011 HC RX IP 250 OP 636

## 2023-10-03 PROCEDURE — 82565 ASSAY OF CREATININE: CPT | Performed by: INTERNAL MEDICINE

## 2023-10-03 PROCEDURE — 82248 BILIRUBIN DIRECT: CPT | Performed by: INTERNAL MEDICINE

## 2023-10-03 PROCEDURE — 250N000011 HC RX IP 250 OP 636: Mod: JZ | Performed by: SURGERY

## 2023-10-03 PROCEDURE — 250N000009 HC RX 250: Performed by: INTERNAL MEDICINE

## 2023-10-03 RX ORDER — PROPOFOL 10 MG/ML
5-75 INJECTION, EMULSION INTRAVENOUS CONTINUOUS
Status: CANCELLED | OUTPATIENT
Start: 2023-10-03

## 2023-10-03 RX ORDER — FENTANYL CITRATE-0.9 % NACL/PF 10 MCG/ML
100 PLASTIC BAG, INJECTION (ML) INTRAVENOUS EVERY 5 MIN PRN
Status: CANCELLED | OUTPATIENT
Start: 2023-10-03

## 2023-10-03 RX ORDER — ETOMIDATE 2 MG/ML
10-20 INJECTION INTRAVENOUS
Status: CANCELLED | OUTPATIENT
Start: 2023-10-03

## 2023-10-03 RX ORDER — HYDROMORPHONE HYDROCHLORIDE 1 MG/ML
.3-.5 INJECTION, SOLUTION INTRAMUSCULAR; INTRAVENOUS; SUBCUTANEOUS
Status: DISCONTINUED | OUTPATIENT
Start: 2023-10-03 | End: 2023-10-13

## 2023-10-03 RX ORDER — OXYCODONE HYDROCHLORIDE 10 MG/1
10 TABLET ORAL EVERY 4 HOURS PRN
Status: DISCONTINUED | OUTPATIENT
Start: 2023-10-03 | End: 2023-10-08

## 2023-10-03 RX ORDER — KETAMINE HCL IN 0.9 % NACL 20 MG/2 ML
50 SYRINGE (ML) INTRAVENOUS
Status: CANCELLED | OUTPATIENT
Start: 2023-10-03

## 2023-10-03 RX ORDER — ACETAMINOPHEN 325 MG/10.15ML
650 LIQUID ORAL EVERY 6 HOURS
Status: DISCONTINUED | OUTPATIENT
Start: 2023-10-03 | End: 2023-10-04

## 2023-10-03 RX ORDER — GABAPENTIN 250 MG/5ML
100 SOLUTION ORAL EVERY 8 HOURS SCHEDULED
Status: DISCONTINUED | OUTPATIENT
Start: 2023-10-03 | End: 2023-10-07

## 2023-10-03 RX ADMIN — Medication 5 MG: at 17:39

## 2023-10-03 RX ADMIN — INSULIN ASPART 2 UNITS: 100 INJECTION, SOLUTION INTRAVENOUS; SUBCUTANEOUS at 08:15

## 2023-10-03 RX ADMIN — HYDROCORTISONE SODIUM SUCCINATE 50 MG: 100 INJECTION, POWDER, FOR SOLUTION INTRAMUSCULAR; INTRAVENOUS at 23:45

## 2023-10-03 RX ADMIN — VORICONAZOLE 300 MG: 40 POWDER, FOR SUSPENSION ORAL at 07:54

## 2023-10-03 RX ADMIN — CALCIUM CHLORIDE, MAGNESIUM CHLORIDE, SODIUM CHLORIDE, SODIUM BICARBONATE, POTASSIUM CHLORIDE AND SODIUM PHOSPHATE DIBASIC DIHYDRATE 12.5 ML/KG/HR: 3.68; 3.05; 6.34; 3.09; .314; .187 INJECTION INTRAVENOUS at 00:14

## 2023-10-03 RX ADMIN — OXYCODONE HYDROCHLORIDE 10 MG: 10 TABLET ORAL at 23:54

## 2023-10-03 RX ADMIN — CALCIUM CHLORIDE, MAGNESIUM CHLORIDE, SODIUM CHLORIDE, SODIUM BICARBONATE, POTASSIUM CHLORIDE AND SODIUM PHOSPHATE DIBASIC DIHYDRATE: 3.68; 3.05; 6.34; 3.09; .314; .187 INJECTION INTRAVENOUS at 21:52

## 2023-10-03 RX ADMIN — Medication 150 MCG/HR: at 05:34

## 2023-10-03 RX ADMIN — METRONIDAZOLE 500 MG: 5 INJECTION, SOLUTION INTRAVENOUS at 04:49

## 2023-10-03 RX ADMIN — HEPARIN SODIUM 5000 UNITS: 5000 INJECTION, SOLUTION INTRAVENOUS; SUBCUTANEOUS at 17:18

## 2023-10-03 RX ADMIN — PANTOPRAZOLE SODIUM 40 MG: 40 INJECTION, POWDER, FOR SOLUTION INTRAVENOUS at 07:53

## 2023-10-03 RX ADMIN — DEXMEDETOMIDINE HYDROCHLORIDE 0.6 MCG/KG/HR: 400 INJECTION INTRAVENOUS at 23:54

## 2023-10-03 RX ADMIN — CEFEPIME HYDROCHLORIDE 2 G: 2 INJECTION, POWDER, FOR SOLUTION INTRAVENOUS at 00:28

## 2023-10-03 RX ADMIN — THIAMINE HCL TAB 100 MG 100 MG: 100 TAB at 19:37

## 2023-10-03 RX ADMIN — CALCIUM CHLORIDE, MAGNESIUM CHLORIDE, SODIUM CHLORIDE, SODIUM BICARBONATE, POTASSIUM CHLORIDE AND SODIUM PHOSPHATE DIBASIC DIHYDRATE 12.5 ML/KG/HR: 3.68; 3.05; 6.34; 3.09; .314; .187 INJECTION INTRAVENOUS at 09:24

## 2023-10-03 RX ADMIN — CALCIUM CHLORIDE, MAGNESIUM CHLORIDE, SODIUM CHLORIDE, SODIUM BICARBONATE, POTASSIUM CHLORIDE AND SODIUM PHOSPHATE DIBASIC DIHYDRATE 12.5 ML/KG/HR: 3.68; 3.05; 6.34; 3.09; .314; .187 INJECTION INTRAVENOUS at 09:25

## 2023-10-03 RX ADMIN — INSULIN ASPART 4 UNITS: 100 INJECTION, SOLUTION INTRAVENOUS; SUBCUTANEOUS at 14:59

## 2023-10-03 RX ADMIN — ACETAMINOPHEN 650 MG: 325 SOLUTION ORAL at 19:37

## 2023-10-03 RX ADMIN — OXYCODONE HYDROCHLORIDE 10 MG: 10 TABLET ORAL at 19:37

## 2023-10-03 RX ADMIN — HYDROCORTISONE SODIUM SUCCINATE 50 MG: 100 INJECTION, POWDER, FOR SOLUTION INTRAMUSCULAR; INTRAVENOUS at 05:47

## 2023-10-03 RX ADMIN — CALCIUM CHLORIDE, MAGNESIUM CHLORIDE, SODIUM CHLORIDE, SODIUM BICARBONATE, POTASSIUM CHLORIDE AND SODIUM PHOSPHATE DIBASIC DIHYDRATE 12.5 ML/KG/HR: 3.68; 3.05; 6.34; 3.09; .314; .187 INJECTION INTRAVENOUS at 21:54

## 2023-10-03 RX ADMIN — CHLORHEXIDINE GLUCONATE 0.12% ORAL RINSE 15 ML: 1.2 LIQUID ORAL at 07:53

## 2023-10-03 RX ADMIN — OXYCODONE HYDROCHLORIDE 10 MG: 10 TABLET ORAL at 09:33

## 2023-10-03 RX ADMIN — CALCIUM CHLORIDE, MAGNESIUM CHLORIDE, SODIUM CHLORIDE, SODIUM BICARBONATE, POTASSIUM CHLORIDE AND SODIUM PHOSPHATE DIBASIC DIHYDRATE 12.5 ML/KG/HR: 3.68; 3.05; 6.34; 3.09; .314; .187 INJECTION INTRAVENOUS at 12:50

## 2023-10-03 RX ADMIN — CALCIUM CHLORIDE, MAGNESIUM CHLORIDE, SODIUM CHLORIDE, SODIUM BICARBONATE, POTASSIUM CHLORIDE AND SODIUM PHOSPHATE DIBASIC DIHYDRATE 12.5 ML/KG/HR: 3.68; 3.05; 6.34; 3.09; .314; .187 INJECTION INTRAVENOUS at 17:56

## 2023-10-03 RX ADMIN — ACETAMINOPHEN 650 MG: 325 SOLUTION ORAL at 02:06

## 2023-10-03 RX ADMIN — HYDROMORPHONE HYDROCHLORIDE 0.5 MG: 1 INJECTION, SOLUTION INTRAMUSCULAR; INTRAVENOUS; SUBCUTANEOUS at 23:00

## 2023-10-03 RX ADMIN — HYDROCORTISONE SODIUM SUCCINATE 50 MG: 100 INJECTION, POWDER, FOR SOLUTION INTRAMUSCULAR; INTRAVENOUS at 17:39

## 2023-10-03 RX ADMIN — HEPARIN SODIUM 5000 UNITS: 5000 INJECTION, SOLUTION INTRAVENOUS; SUBCUTANEOUS at 08:57

## 2023-10-03 RX ADMIN — HYDROCORTISONE SODIUM SUCCINATE 50 MG: 100 INJECTION, POWDER, FOR SOLUTION INTRAMUSCULAR; INTRAVENOUS at 12:20

## 2023-10-03 RX ADMIN — OXYCODONE HYDROCHLORIDE 10 MG: 10 TABLET ORAL at 14:19

## 2023-10-03 RX ADMIN — DEXMEDETOMIDINE HYDROCHLORIDE 0.6 MCG/KG/HR: 400 INJECTION INTRAVENOUS at 17:18

## 2023-10-03 RX ADMIN — DEXMEDETOMIDINE HYDROCHLORIDE 0.6 MCG/KG/HR: 400 INJECTION INTRAVENOUS at 06:21

## 2023-10-03 RX ADMIN — CALCIUM CHLORIDE, MAGNESIUM CHLORIDE, SODIUM CHLORIDE, SODIUM BICARBONATE, POTASSIUM CHLORIDE AND SODIUM PHOSPHATE DIBASIC DIHYDRATE 12.5 ML/KG/HR: 3.68; 3.05; 6.34; 3.09; .314; .187 INJECTION INTRAVENOUS at 04:12

## 2023-10-03 RX ADMIN — HYDROMORPHONE HYDROCHLORIDE 0.5 MG: 1 INJECTION, SOLUTION INTRAMUSCULAR; INTRAVENOUS; SUBCUTANEOUS at 17:38

## 2023-10-03 RX ADMIN — GABAPENTIN 100 MG: 250 SUSPENSION ORAL at 21:41

## 2023-10-03 RX ADMIN — INSULIN ASPART 5 UNITS: 100 INJECTION, SOLUTION INTRAVENOUS; SUBCUTANEOUS at 12:16

## 2023-10-03 RX ADMIN — INSULIN ASPART 1 UNITS: 100 INJECTION, SOLUTION INTRAVENOUS; SUBCUTANEOUS at 03:44

## 2023-10-03 RX ADMIN — ACETAMINOPHEN 650 MG: 325 SOLUTION ORAL at 07:53

## 2023-10-03 RX ADMIN — CALCIUM GLUCONATE 2 G: 20 INJECTION, SOLUTION INTRAVENOUS at 20:15

## 2023-10-03 RX ADMIN — INSULIN ASPART 3 UNITS: 100 INJECTION, SOLUTION INTRAVENOUS; SUBCUTANEOUS at 19:55

## 2023-10-03 RX ADMIN — Medication 10 ML: at 08:57

## 2023-10-03 RX ADMIN — CALCIUM CHLORIDE, MAGNESIUM CHLORIDE, SODIUM CHLORIDE, SODIUM BICARBONATE, POTASSIUM CHLORIDE AND SODIUM PHOSPHATE DIBASIC DIHYDRATE 12.5 ML/KG/HR: 3.68; 3.05; 6.34; 3.09; .314; .187 INJECTION INTRAVENOUS at 12:52

## 2023-10-03 RX ADMIN — THIAMINE HCL TAB 100 MG 100 MG: 100 TAB at 07:54

## 2023-10-03 RX ADMIN — ACETAMINOPHEN 650 MG: 325 SOLUTION ORAL at 14:19

## 2023-10-03 ASSESSMENT — ACTIVITIES OF DAILY LIVING (ADL)
ADLS_ACUITY_SCORE: 47
ADLS_ACUITY_SCORE: 43
ADLS_ACUITY_SCORE: 47
ADLS_ACUITY_SCORE: 47
ADLS_ACUITY_SCORE: 43
ADLS_ACUITY_SCORE: 47
ADLS_ACUITY_SCORE: 47
ADLS_ACUITY_SCORE: 43
ADLS_ACUITY_SCORE: 43

## 2023-10-03 NOTE — PROGRESS NOTES
CRRT STATUS NOTE    DATA:  Time:  6:51 AM  Pressures WNL:  YES  Filter Status:  WDL    Problems Reported/Alarms Noted:  none    Supplies Present:  YES    ASSESSMENT:  Patient Net Fluid Balance:  10/2: Net +300 mL; 10/3: Net -350 mL since MN  Vital Signs:  Temp:  [98.6  F (37  C)-101.1  F (38.4  C)] 98.8  F (37.1  C)  Pulse:  [] 85  Resp:  [11-27] 11  BP: (99)/(62) 99/62  MAP:  [63 mmHg-95 mmHg] 67 mmHg  Arterial Line BP: ()/(51-77) 94/52  FiO2 (%):  [40 %-60 %] 50 %  SpO2:  [91 %-100 %] 98 %  Labs:  K 4.6, Mg 2.5, Phos 4.6, iCa 4.4, Crt 2.26  Goals of Therapy:  50 ml/h net negative, meeting goals this am    INTERVENTIONS:   none      PLAN:  Continue fluid removal as tolerated per goals of therapy. Check circuit daily and change circuit q72h and prn. Please contact CRRT resource RN at [phone] with any questions/concerns.

## 2023-10-03 NOTE — PROGRESS NOTES
CLINICAL NUTRITION SERVICES - BRIEF NOTE      Reason for RD note: Following up on tolerance to EN support and ability to discontinue TPN    New Findings/Chart Review:  Nutrition support:   -Tolerating Pivot 1.5 @ 30 mL/hr since 0000 last night.  Goal is 50 mL/hr.  -TPN without lipids running.     Enteral Access: NGT    Interventions:  Rounds: Discontinue and taper TPN. Continue to advance TF to goal rate (adv of 10ml q 8 hours to goal).     Future/Additional Recommendations:  Monitor tolerance with advancement to goal TF rate.     Nutrition will continue to follow per protocol.    Ashely Johnson RD, LD  Pager: [phone]; Ascom: *[phone]

## 2023-10-03 NOTE — PROGRESS NOTES
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 He currently remains intubated with open abdomen, and his post-op course has been complicated by multiple abdominal washouts, possible ischemic bowel, and unsuccessful LLE embolectomy. His 9/28 respiratory aerobic culture has grown 3+ Candida Albicans and 1+ Aspergillus fumigatus with a negative galactomannan. In this setting, would think that the aspergillus is likely not pathogenic and therefore does not warrant treatment with voriconazole. Additionally, covering empirically for intra-abdominal infection is reasonable given multiple procedure and critical illness, but could de-escalate cefepime to ceftriaxone as if pseudomonas were present, it likely would have already been present in some sense since admission on 9/24. As of the morning of 10/3, Mr. Burnham is not on any antimicrobials.     Given that there are no other acute infectious disease questions at this time, we will sign off.     Thank you for allowing us to participate in the care of this patient. Please page or call with questions.     Иван Díaz MD  PGY1, Internal Medicine & Pediatrics Residency  [facility]  Pager: [phone]         Interval History:   NAEO. Went for abdominal washout yesterday. Intermittently following commands, raising arms in air, shaking head to yes & no questions. On CRRT, family at bedside.            Review of Systems:   Full 12 point ROS obtained, pertinent positives and negatives as above.          Current Antimicrobials    None    Prev:  Cefepime  Flagyl  Voriconazole           Physical Exam:   Ranges for vital signs:  Temp:  [98.6  F (37  C)-101.1  F (38.4  C)] 98.8  F (37.1  C)  Pulse:  [] 91  Resp:  [9-27] 10  BP: (93-99)/(62-71) 93/71  MAP:  [62 mmHg-95 mmHg] 67 mmHg  Arterial Line BP: ()/(50-77) 81/57  FiO2 (%):  [40 %-60 %] 40 %  SpO2:  [88 %-100 %] 96 %    Intake/Output Summary (Last 24 hours) at 10/3/2023 1142  Last data filed at 10/3/2023 1000  Gross per 24 hour   Intake 3252.9 ml   Output 4217.7 ml   Net -964.8 ml     Exam:  GENERAL:  Intubated but awake. Intermittent arm raising. Appearing somewhat anxious, NAD  ENT:  Head is normocephalic, atraumatic.   EYES:  Eyes have anicteric sclerae. No conjunctival injection.   LUNGS:  Intermittently tachypenic. Coarse throughout on ventilator with no obvious crackles or wheezing.  CARDIOVASCULAR:  Regular rate and rhythm with no murmurs, rubs, or gallops.  ABDOMEN:  Firm, distended,   EXT: LLE cool, dusky in appearance. Wound vac in place. RLE warm and well perfused  SKIN:  No acute rashes.  Line is in place without any surrounding erythema.  NEUROLOGIC:  Awake, following commands, able to answer most yes and no questions.          Laboratory Data:       Inflammatory Markers  No lab results found.    Hematology Studies    Recent Labs   Lab Test 10/03/23  0343 10/02/23  1957 10/02/23  1435 10/02/23  0408 10/02/23  0305 10/01/23  1954   WBC 12.3* 13.7* 10.0 7.5 7.4 9.0   HGB 7.7* 8.1* 7.5* 7.8* 7.4* 7.7*   * 101* 100 101* 99 98   PLT 51* 66* 67* 33* 38* 41*       Metabolic Studies     Recent Labs   Lab Test 10/03/23  0343 10/02/23  1957 10/02/23  1435 10/02/23  0305 10/01/23  1954     138 138 139 139  139 140   POTASSIUM 4.6  4.6 4.4 4.3 4.0  4.0 4.0   CHLORIDE 106  106 106 106 107  107 107   CO2 18*  18* 19* 20* 18*  18* 19*   BUN 58.8*  58.8* 54.1* 55.5* 50.2*  50.2* 52.9*   CR 2.26*  2.26* 2.38* 2.68* 2.71*  2.71* 2.93*   GFRESTIMATED 30*   30* 29* 25* 24*  24* 22*       Hepatic Studies    Recent Labs   Lab Test 10/03/23  0343 10/02/23  1957 10/02/23  1435 10/02/23  0305 10/01/23  1954 10/01/23  1155 10/01/23  0344 09/30/23  1931 09/30/23  1003 09/30/23  0403 09/30/23  0048   BILITOTAL 1.1  --   --  1.8*  --   --  1.6*  --  1.4* 1.4* 1.5*   ALKPHOS 142*  --   --  127  --   --  103  --  75 75 76   ALBUMIN 2.6*  2.6* 2.6* 2.5* 2.5*  2.5* 2.5* 2.5* 2.0*  2.0*   < > 2.1* 2.2* 2.5*   AST 64*  --   --  66*  --   --  88*  --  125* 133* 135*   ALT 21  --   --  25  --   --  26  --  30 37 36    < > = values in this interval not displayed.       Microbiology:  Culture   Date Value Ref Range Status   10/01/2023 2+ Candida albicans (A)  Final     Comment:     Susceptibilities not routinely done, refer to antibiogram to view typical susceptibility profiles   10/01/2023 2+ Debbi krusei (A)  Final     Comment:     Susceptibilities not routinely done, refer to antibiogram to view typical susceptibility profiles   10/01/2023 1+ Normal hakan  Final   10/01/2023 No growth after 2 days  Preliminary   10/01/2023 No growth after 2 days  Preliminary   09/28/2023 3+ Candida albicans (A)  Final     Comment:     Susceptibilities not routinely done, refer to antibiogram to view typical susceptibility profiles   09/28/2023 1+ Aspergillus fumigatus complex (A)  Final   09/28/2023 3+ Normal hakan  Final       Urine Studies    Recent Labs   Lab Test 10/01/23  1049 09/30/23  1029 09/30/23  0648   LEUKEST Negative Negative Negative   WBCU 7* 7* 12*       Vancomycin Levels  No lab results found.    Invalid input(s): VANCO    Hepatitis B Testing No lab results found.  Hepatitis C Testing   No results found for: HCVAB, HQTG, HCGENO, HCPCR, HQTRNA, HEPRNA  Respiratory Virus Testing    No results found for: RS, FLUAG         Imaging:     10/3/23 Chest Xray:    Support devices are in stable position. Endotracheal tube  tip is approximately 2.5 cm above the lissa. Slightly improving  hazy  opacities over the left costophrenic angle and continued opacification  of the right lower lobe which may represent atelectasis and/or  effusion but cannot exclude infectious process.

## 2023-10-03 NOTE — PROGRESS NOTES
Patient extubated per MD order.     Patient needed HFNC 50L/70% after about 10 minutes to keep sats above 90%.

## 2023-10-03 NOTE — PROGRESS NOTES
Nephrology Progress Note  10/03/2023       Alfredo Burnham is a 68 yom with complex hx of TIA, HTN, blindness who presented to [facility] 9/24 with severe abdominal pain radiating to flank and back, CT revealed AAA with a contained rupture.  Taken to OR for aortobiiliac bypass and resection of ruptured pararenal aneurysm.   Post op course complicated by hypotension requiring pressors and metabolic acidosis.  Baseline Cr 1.0 on presentation but on the rise to >5 at time of renal consult for MAYA management and possible RRT.       Interval History :   Mr Burnham continues on CRRT with 4k baths, able to be even yesterday despite long OR case causing down-time.  Off of pressors but SBP in 80's-90's this am and is up ~10kg in wt from early in course so will keep with CRRT modality today, will try to pull 1-2L (50-100cc/h net negative) today.  In order to transition to iHD I would like to get a bit closer to euvolemic, see some more robust BP's and have intake in range where we are not needing to pull >3L just to keep up.      Assessment & Recommendations:   MAYA-Baseline Cr 1.0, ordered UA.  CT showed benign cyst but otherwise normal kidneys.  Cr on the rise since surgery, did receive contrast for CTA.  Urine microscopy showed granular casts suggesting ATN which will recover with time and stabilizing hemodynamics.  Started on CRRT 9/30 for volume and clearance with minimal UOP and rising Cr.                  -Started CRRT 9/30 for volume and clearance.  4k baths, 50-100cc/h net negative.                   -Dialysis consent signed and scanned into media tab.      Volume-Total body volume overloaded but much of it is likely 3rd spaced with low albumin and acute illness.  Net even yesterday, trying to be 1-2L net negative, ordered for 50-100cc/h net negative.  Is about 10kg up in wt over the past week, will try to get closer to euvolemic      Electrolytes-K 4.6, bicarb 18 with borderline AG.  Will follow with CRRT and  "consider increasing dose if it remains low, Na 138.        BMD-Ca 7.8, Mg and Phos WNL.       Anemia-Hgb 7.7, ~14 on presentation, acute management per team.       Nutrition-On TPN started 9/27     Time spent: 40 minutes on this date of encounter for chart review, physical exam, medical decision making and co-ordination of care.      Seen and discussed with Dr Oconnor     Recommendations were communicated to primary team via verbal communication.        Bebeto Sesay, APRN CNS  Clinical Nurse Specialist  [phone]    Review of Systems:   I reviewed the following systems:  ROS not done due to vent/sedation.     Physical Exam:   I/O last 3 completed shifts:  In: 3719.13 [I.V.:1331.53; NG/GT:445]  Out: 3944 [Drains:50; Other:3819; Blood:75]   BP 93/71   Pulse 91   Temp 98.8  F (37.1  C)   Resp 10   Ht 1.727 m (5' 8\")   Wt 93 kg (205 lb 0.4 oz)   SpO2 96%   BMI 31.17 kg/m       GENERAL APPEARANCE: Intubated and sedated.    EYES: No scleral icterus  Pulmonary: On vent 40%/8 of PEEP  CV: Regular rhythm, normal rate   - Edema +3 generalized.    GI: distended, nontender  MS: no evidence of inflammation in joints, no muscle tenderness  : + Lu  SKIN: no rash, warm, dry  NEURO: Intubated and sedated.         Labs:   All labs reviewed by me  Electrolytes/Renal -   Recent Labs   Lab Test 10/03/23  0815 10/03/23  0343 10/03/23  0342 10/02/23  2353 10/02/23  1957 10/02/23  1647 10/02/23  1435   NA  --  138  138  --   --  138  --  139   POTASSIUM  --  4.6  4.6  --   --  4.4  --  4.3   CHLORIDE  --  106  106  --   --  106  --  106   CO2  --  18*  18*  --   --  19*  --  20*   BUN  --  58.8*  58.8*  --   --  54.1*  --  55.5*   CR  --  2.26*  2.26*  --   --  2.38*  --  2.68*   * 214*  214* 188*   < > 158*   < > 146*   OMAR  --  7.8*  7.8*  --   --  8.2*  --  7.4*   MAG  --  2.5*  --   --  2.5*  --  2.3   PHOS  --  4.6*  --   --  4.1  --  4.5    < > = values in this interval not displayed.         CBC - "   Recent Labs   Lab Test 10/03/23  0343 10/02/23  1957 10/02/23  1435   WBC 12.3* 13.7* 10.0   HGB 7.7* 8.1* 7.5*   PLT 51* 66* 67*         LFTs -   Recent Labs   Lab Test 10/03/23  0343 10/02/23  1957 10/02/23  1435 10/02/23  0305 10/01/23  1155 10/01/23  0344   ALKPHOS 142*  --   --  127  --  103   BILITOTAL 1.1  --   --  1.8*  --  1.6*   ALT 21  --   --  25  --  26   AST 64*  --   --  66*  --  88*   PROTTOTAL 5.0*  --   --  4.3*  --  4.1*   ALBUMIN 2.6*  2.6* 2.6* 2.5* 2.5*  2.5*   < > 2.0*  2.0*    < > = values in this interval not displayed.         Iron Panel - No lab results found.        Current Medications:   acetaminophen  650 mg Oral or Feeding Tube Q6H    B and C vitamin Complex with folic acid  10 mL Per Feeding Tube Daily    chlorhexidine  15 mL Mouth/Throat Q12H    heparin ANTICOAGULANT  5,000 Units Subcutaneous Q8H    hydrocortisone sodium succinate PF  50 mg Intravenous Q6H COLUMBA    insulin aspart  1-6 Units Subcutaneous Q4H    melatonin  5 mg Oral or Feeding Tube QPM    pantoprazole  40 mg Per Feeding Tube QAM AC    Or    pantoprazole  40 mg Intravenous QAM AC    [Held by provider] polyethylene glycol  17 g Oral or Feeding Tube Daily    protein modular  1 packet Per Feeding Tube TID    [Held by provider] senna-docusate  1 tablet Oral or Feeding Tube BID    sodium chloride (PF)  3 mL Intracatheter Q8H    thiamine  100 mg Oral or Feeding Tube BID      dexmedeTOMIDine 0.6 mcg/kg/hr (10/03/23 0621)    dextrose      dextrose      dextrose      CRRT replacement solution 12.5 mL/kg/hr (10/03/23 0924)    fentaNYL 150 mcg/hr (10/03/23 0600)    - MEDICATION INSTRUCTIONS -      parenteral nutrition - ADULT compounded formula 45 mL/hr at 10/03/23 0000    CRRT replacement solution 200 mL/hr at 10/02/23 1949    CRRT replacement solution 12.5 mL/kg/hr (10/03/23 0925)

## 2023-10-03 NOTE — PROGRESS NOTES
SURGICAL ICU PROGRESS NOTE  10/03/2023        Date of Service (when I saw the patient): 10/03/2023    ASSESSMENT:  Alfredo Burnham is a 68 year old male who was admitted to the SICU on 9/24/2023 s/p open AAA repair. Patient has a history of HTN and previous TIA. He presented to the ED on 9/24 with right sided abdominal pain and was found to have a contained, ruptured AAA on CT. 30 min super-celiac clamp time. Prolonged intra-renal clamp. 9/25 s/p flex sig showing rectal ischemia, abdominal washout showing a hemostatic AAA graft and no evidence of transmural colonic or small bowel ischemia, and an unsuccessful LLE embolectomy. 9/26 s/p repeat abdominal washout, retroperitoneal closure, LLE wound washout/closure. 9/29 s/p repeat abd washout, bowel appeared well perfused w/o notable ischemia. OR 10/2 for abdominal fascia closure.    CHANGES and MAJOR THINGS TODAY:   - Improve pain control (add oxycodone 10 mg q4 PRN)  - Extubate today  - Swallow study tomorrow (10/4)  - Recheck platelets this afternoon and transfuse if indicated   - Discontinue TPN  - Titrate up tube feeds to goal as tolerated  - Discontinue cefepime, metronidazole, and voriconazole  - Continue to pull fluid on CRRT    PLAN:    Neurological:  # Acute pain   # Mixed metabolic encephalopathy   - Monitor neurological status. Delirium preventions and precautions  - Sedation plan: Precedex gtt (@ 0.6)  - RASS goal 0 to -1  - Pain: fentanyl gtt (@150); wean as tolerated; discomfort endorsed overnight in scrotum and abdomen   - Schedule tylenol  - melatonin q6pm  - Add PRN oxycodone 10 mg q4 PRN    Pulmonary:  # Acute hypoxic respiratory failure  - VENT: CPAP FiO2=40%, PEEP=8  - Satting well overnight (%)   - PST daily. Adequate mechanics and RSBI  - Head of bed elevation   - Pressure supporting since 10/2 post-op  - Extubate today (10/3)    Cardiovascular:    # Contained AAA rupture s/p open repair  # Suspected emboli to LLE  #Hx of HTN  #Sinus  tachycardia  #Ischemic LLE  - Monitor hemodynamic status.   - Ischemic LLL- no pulse signals. Intra op evaluation 10/2 with partially necrotic muscle.   - Goal MAP >65, SBP <160  - CVPs 11-12 over last 24 hrs   - Bigeminy/quadgeminy noted overnight by nursing staff  - Subcutaneous heparin  - Discontinue Flotrac monitoring    GI/Nutrition:    # Protein calorie deficit malnutrition  #s/p open AAA repair, abthera device in place  # Post-operative melena  # Rectal ischemia, concern for (resolved)  - PPI for ppx  - Abthera device in place; mesh closure of abdomen   - Discontinue TPN  - Titrate up tube feeds to goal    Renal/ Fluids/Electrolytes:  # MAYA  - Access: Right femoral temporary dialysis catheter  - Total volume up with third spacing; weight 23 kg up since admission  - +296 fluids 10/2; pulled 3L on CRRT  - Continue to pull fluid on CRRT as tolerated   - Discontinue bladder scans and switch to daily straight cath  - Plan to switch from CRRT to IHD in the upcoming days    Endocrine:  # Stress hyperglycemia  - Q6 BG checks, switching to high dose sliding scale insulin.   - Blood glucose in 180's overnight, required 1 dose of SSI    Infectious disease:   #Fever  #Positive aspergillus in respiratory tract  - T max 100.9 with CRRT heater OFF.   - 9/28 BAL with 3+ candida albicans, +1 aspergillus fumigatus  - Empirically treated with Metronidazole and Cefepime given prior concerns of intestinal ischemia; will discontinue  - Appreciate ID recs  - Discontinue voriconazole     Hematology:    # Acute blood loss anemia  # Thrombocytopenia, multifactorial  # LLE DVT  Admission platelet count 94K  - Platelets at 51. 4T score: 3 points (0/1/2/0). Low probability for HIT. Thrombocytopenia likely multifactorial given acute blood loss, critical illness  - Holding subcutaneous heparin  - Hemoglobin stable at 7.7  - Hydrocortisone 50mg q6 for 4 days (10/2- )  - Platelets at 51, down from 67 yesterday evening. Recheck this afternoon  and transfuse if indicated.     Musculoskeletal:  # Weakness and deconditioning of critical illness  # Left lower limb ischemia   - Physical and occupational therapy consult when appropriate. Passive ROM at bedside with RN  - Likely will return to OR with vascular in the next few days of LLE amputation    General Cares/Prophylaxis:    DVT Prophylaxis: Heparin SQ and Pneumatic Compression Devices  GI Prophylaxis: PPI  Restraints: Restraints for medical healing needed: YES    Lines/ tubes/ drains:  - ETT  - Lu- dc  - Abthera  - PIV x3  - RIJ MAC  - R radial arterial line  - R FV dialysis catheter    Disposition:  - Surgical ICU     Patient seen, findings and plan discussed with surgical ICU staff, Dr. Narvaez.      Jenni Abby, MS4  Surgical ICU    ====================================  INTERVAL HISTORY:   No acute events overnight. Mr. Burnham was expressing pain per nursing staff, motioning toward his abdomen and scrotum. Also is continually raising arms above head.     ROS unable to be performed due to sedation and intubation.      OBJECTIVE:   1. VITAL SIGNS:   Temp:  [98.6  F (37  C)-101.1  F (38.4  C)] 99  F (37.2  C)  Pulse:  [] 88  Resp:  [11-27] 13  MAP:  [63 mmHg-95 mmHg] 66 mmHg  Arterial Line BP: ()/(51-77) 98/51  FiO2 (%):  [40 %-60 %] 50 %  SpO2:  [91 %-100 %] 95 %  Vent Mode: CPAP/PS  (Continuous positive airway pressure with Pressure Support)  FiO2 (%): 50 %  Resp Rate (Set): 16 breaths/min  Tidal Volume (Set, mL): 450 mL  PEEP (cm H2O): 8 cmH2O  Pressure Support (cm H2O): 6 cmH2O  Resp: 13      2. INTAKE/ OUTPUT:   I/O last 3 completed shifts:  In: 3659.27 [I.V.:1355.27; NG/GT:415]  Out: 3077.6 [Drains:250; Other:2752.6; Blood:75]    3. PHYSICAL EXAMINATION (prior to extubation)  General: Intubated, lightly sedated. Can squeeze fingers on command.   HEENT: Normocephalic, atraumatic.   Neuro: Pupils equal and reactive, withdraws upper extremities to noxious stimulus.   Pulm/Resp:  coarse lung sounds throughout, no wheezing.   CV: Occasional bouts of bigeminy overnight. Sinus tachycardia on monitor.   Abdomen: Open abdomen with Abthera in place, serosanguinous output, soft, moderately distended, soft  :  Bladder scan showed 28 mL. Significant scrotal and penile swelling.   MSK/Extremities: RLE warm to palpation with palpable pulse, edematous. LLE cool to touch, dusky, no signals distal of popliteal.     4. INVESTIGATIONS:   Arterial Blood Gases   Recent Labs   Lab 10/01/23  0348 09/30/23  1547 09/30/23  0404 09/30/23  0048   PH 7.42 7.42 7.35 7.35   PCO2 34* 29* 35 35   PO2 110* 198* 72* 65*   HCO3 22 19* 19* 19*     Complete Blood Count   Recent Labs   Lab 10/03/23  0343 10/02/23  1957 10/02/23  1435 10/02/23  0408   WBC 12.3* 13.7* 10.0 7.5   HGB 7.7* 8.1* 7.5* 7.8*   PLT 51* 66* 67* 33*     Basic Metabolic Panel  Recent Labs   Lab 10/03/23  0343 10/03/23  0342 10/02/23  2353 10/02/23  1957 10/02/23  1647 10/02/23  1435 10/02/23  0411 10/02/23  0305     138  --   --  138  --  139  --  139  139   POTASSIUM 4.6  4.6  --   --  4.4  --  4.3  --  4.0  4.0   CHLORIDE 106  106  --   --  106  --  106  --  107  107   CO2 18*  18*  --   --  19*  --  20*  --  18*  18*   BUN 58.8*  58.8*  --   --  54.1*  --  55.5*  --  50.2*  50.2*   CR 2.26*  2.26*  --   --  2.38*  --  2.68*  --  2.71*  2.71*   *  214* 188* 177* 158*   < > 146*   < > 155*  155*    < > = values in this interval not displayed.     Liver Function Tests  Recent Labs   Lab 10/03/23  0343 10/02/23  1957 10/02/23  1435 10/02/23  0305 10/01/23  1155 10/01/23  0344 09/30/23  1931 09/30/23  1003 09/30/23  0403   AST 64*  --   --  66*  --  88*  --  125* 133*   ALT 21  --   --  25  --  26  --  30 37   ALKPHOS 142*  --   --  127  --  103  --  75 75   BILITOTAL 1.1  --   --  1.8*  --  1.6*  --  1.4* 1.4*   ALBUMIN 2.6*  2.6* 2.6* 2.5* 2.5*  2.5*   < > 2.0*  2.0*   < > 2.1* 2.2*   INR  --   --   --  1.30*  --  1.28*   --  1.36* 1.35*    < > = values in this interval not displayed.     Pancreatic Enzymes  No lab results found in last 7 days.  Coagulation Profile  Recent Labs   Lab 10/02/23  0305 10/01/23  0344 09/30/23  1003 09/30/23  0403 09/30/23  0048 09/29/23  1938   INR 1.30* 1.28* 1.36* 1.35* 1.31* 1.37*   PTT  --   --  39* 37 38 37         5. RADIOLOGY:   Recent Results (from the past 24 hour(s))   US Lower Extremity Arterial Duplex Left    Narrative    Exam: Duplex ultrasound of left lower extremity arteries dated  10/2/2023 9:56 AM     Clinical information: Limited arterial duplex of LLE assess left  arterial flow including common femoral flow and profounda     Comparison: None available    Technique: Grayscale (B-mode), color Doppler, and duplex spectral  Doppler ultrasound of the lower extremity arteries. Velocity  measurements obtained with angle correction of 60 degrees or less.    Ordering provider: Miryam Carrasco    Findings:     Right lower extremity:     Common femoral artery: Velocity: 29 cm/sec. Waveforms: Multiphasic  Profunda femoral artery: Velocity: 281 cm/sec. Waveforms: Multiphasic  Origin SFA: Velocity: 72 cm/sec. Waveforms: Multiphasic  Proximal SFA:Velocity:  47 cm/sec. Waveforms: Monophasic      Impression    Impression:     1. Left leg: Patient left common femoral and superficial femoral  artery as well as the profunda femoris. The flow is diminished in the   left common femoral and superficial femoral arteries. Moderate  stenosis of the profunda femoris artery.    Guidelines:    [facility] duplex criteria for lower limb arterial  occlusive disease    Percent stenosis:     Normal (1-19%): Peak systolic velocity (cm/s): <150, End-diastolic  velocity (cm/s): <40, Velocity ratio (Vr): <1.5, Distal arterial  waveform: Triphasic    20-49%: Peak systolic velocity (cm/s): 150-200, End-diastolic velocity  (cm/s): <40, Velocity ratio (Vr): 1.5-2.0, Distal arterial waveform:  Triphasic    50-75%:  Peak systolic velocity (cm/s): 200-300, End-diastolic velocity  (cm/s): <90, Velocity ratio (Vr): 2.0-3.9, Distal arterial waveform:  Poststenotic turbulence distal to stenosis, monophasic distal waveform    >75%: Peak systolic velocity (cm/s): >300, End-diastolic velocity  (cm/s): <90, Velocity ratio (Vr): >4.0, Distal arterial waveform:  Dampened distal waveform and low PSV/EDV* in the stenosis    Occlusion: Absent flow by color Doppler/pulsed Doppler spectral  analysis; length of occlusion estimated from distance between exit and  reentry collateral arteries    *PSV = peak systolic velocity, EDV = end-diastolic velocity  http://link.cortez.com/chapter/10.1007/[email or web address]/fulltext  html    MARK ALMAGUER MD         SYSTEM ID:  [id number]   XR Abdomen Port 1 View    Narrative    EXAMINATION:  XR ABDOMEN PORT 1 VIEW 10/2/2023     COMPARISON: Abdominal radiograph 9/25/2023.    HISTORY: closure per protocol.    TECHNIQUE: Frontal supine views of the abdomen and pelvis.    FINDINGS: Right lower approach vascular access cannula with tip in the  right common iliac vessel. Nasogastric tube tip and side-port project  of the stomach. Retroperitoneal surgical clips project at the left  paramedian hemiabdomen. No evidence of retained surgical sponge or  instrumentation. Packing material seen on comparison exam has been  removed. No abnormally dilated loops of bowel, free air, or  pneumatosis in the visualized abdomen. No portal venous gas.  The lung  bases are unremarkable.       Impression    IMPRESSION:   1. Intraoperative evaluation of the abdomen without evidence of  retained surgical instrumentation.  2. Otherwise normal abdominal radiographs with support devices as  above.    I have personally reviewed the examination and initial interpretation  and I agree with the findings.    JOAN LEBRON MD         SYSTEM ID:  [id number]       =========================================    I saw and evaluated the patient in  an independent visit and agree with the student's assessment and plan as written above. I was present at all times for any mentioned procedures.     Clint Braga MD  PGY-1, Neurosurgery  Off-service on SICU

## 2023-10-03 NOTE — ANESTHESIA POSTPROCEDURE EVALUATION
Patient: Alfredo Burnham    Procedure: Procedure(s):  Exploratory laparotomy, abominal closure, wound vac change left lower extremity       Anesthesia Type:  General    Note:  Disposition: ICU            ICU Sign Out: Anesthesiologist/ICU physician sign out WAS performed   Postop Pain Control: Uneventful            Sign Out: Well controlled pain   PONV: No   Neuro/Psych: Uneventful            Sign Out: Acceptable/Baseline neuro status   Airway/Respiratory: Uneventful            Sign Out: AIRWAY IN SITU/Resp. Support   CV/Hemodynamics: Uneventful            Sign Out: Acceptable CV status; No obvious hypovolemia; No obvious fluid overload   Other NRE: NONE   DID A NON-ROUTINE EVENT OCCUR? No         Last vitals:  Vitals:    10/03/23 1015 10/03/23 1030 10/03/23 1045   BP:   93/71   Pulse: 94 90 91   Resp: 11 15 10   Temp: 37.1  C (98.8  F) 37.1  C (98.8  F) 37.1  C (98.8  F)   SpO2:   96%       Electronically Signed By: Sandro Gibbons MD  October 3, 2023  10:55 AM

## 2023-10-03 NOTE — PROGRESS NOTES
Vascular Surgery Progress Note  10/03/2023       Subjective:  To the OR yesterday for abdominal closure, now S/P closure of abdominal fascia and subcutaneous tissue left open with wound VAC applied. LLE VAC exchange yesterday in the OR. PS/CPAP overnight, decreased FiO2 this morning from 50% to 40%. Remains on low-dose Precedex drip, moving all extremities to command, nodding head yes/no to simple questions.  Sputum culture from 10/1/2023 with 2+ Candida, mild leukocytosis with WBC 12.3.    Objective:  Temp:  [98.6  F (37  C)-101.1  F (38.4  C)] 99  F (37.2  C)  Pulse:  [] 86  Resp:  [11-27] 13  BP: (99)/(62) 99/62  MAP:  [63 mmHg-95 mmHg] 69 mmHg  Arterial Line BP: ()/(51-77) 104/53  FiO2 (%):  [40 %-60 %] 40 %  SpO2:  [91 %-100 %] 99 %    I/O last 3 completed shifts:  In: 3719.13 [I.V.:1331.53; NG/GT:445]  Out: 3944 [Drains:50; Other:3819; Blood:75]      Gen: intubated, lightly sedated, moves upper extremities to commands, nods head yes/no to simple questions, appears neurologically intact  CV: RR per radial pulse, off pressors, sinus tach per tele with PACs  Resp: Mechanical breaths on ventilator, synchronous with vent, nonlabored.  Abd: Wound vac in place, holding seal, abdomen soft to palpation around VAC.    Ext: RLE wwp. LLE cool to touch,dusky, some redness appreciated on dorsal aspect of foot. Blistering appreciated on proximal calf posteriorly. no DP/PT signals on LLE.     UOP: None since removal of aparicio  Temp abd closure output: <100ml over the last 24 hours.     Labs:  Recent Labs   Lab 10/03/23  0343 10/02/23  1957 10/02/23  1435   WBC 12.3* 13.7* 10.0   HGB 7.7* 8.1* 7.5*   PLT 51* 66* 67*       Recent Labs   Lab 10/03/23  0815 10/03/23  0343 10/03/23  0342 10/02/23  2353 10/02/23  1957 10/02/23  1647 10/02/23  1435   NA  --  138  138  --   --  138  --  139   POTASSIUM  --  4.6  4.6  --   --  4.4  --  4.3   CHLORIDE  --  106  106  --   --  106  --  106   CO2  --  18*  18*  --   --  19*   --  20*   BUN  --  58.8*  58.8*  --   --  54.1*  --  55.5*   CR  --  2.26*  2.26*  --   --  2.38*  --  2.68*   * 214*  214* 188*   < > 158*   < > 146*   OMAR  --  7.8*  7.8*  --   --  8.2*  --  7.4*   MAG  --  2.5*  --   --  2.5*  --  2.3   PHOS  --  4.6*  --   --  4.1  --  4.5    < > = values in this interval not displayed.      Assessment/Plan:   68 year old male with PMH of HTN and previous TIA who presented with R sided abdominal pain found to have contained ruptured AAA, now s/p open AAA repair 9/24 into 9/25am with 30 min supraceliac clamp time, prolonged inter-renal clamp time, temporary abdominal closure. He remains critically ill in the ICU. He did have bloody stool postop with flex sig 9/25 demonstrating ischemic mucosal changes of the rectum, however on repeated abdominal looks he has had no evidence of transmural necrosis of the small or large intestine, or the rectum. 9/29 he was started on CRRT given ongoing low UOP and rising Cr and failure of lasix challenge. Hemodynamically he continues to do well off pressors with some lower pressures with volume pulling from CRRT, cultures to be negative on I/Os, have not been able to pull large volumes.  Ongoing absent signals with ischemic LLE, note DVT in this leg, this is to be expected given ischemia and absent inflow. We will continue to monitor LLE given it is not currently making patient systemically ill and there is no indication for urgent intervention. S/p closure of abdominal fascia and subcutaneous tissue left open with wound VAC applied 10/2/23.    - Pain/Sedation per ICU, appreciate sedation vacations to assess neuro status  - Wean vent as able, consider extubation today if does well with SBT (spontaneous breathing trial)  - Goal SBP <160, MAP >65  - Appreciate flotract monitoring  - Resume trickle TF this morning  - Appreciate nephrology consult and recommendations  - Continue CRRT at this time  - Repeat sputum culture from 10/1/23  prelim with 2+ candida, likely colonization, no clear indications for antibiotics/antifungals. Flagyl, cefepime, voriconazole stopped this morning. Blood cultures NGTD  - Ongoing thrombocytopenia, responsive to PLTs transfusions. 4T score of 3. Low HIT probability.  - There is DVT of LLE popliteal vein and we expect there to be  thrombosis from stasis in the tibial and popliteal veins as this is the level the patient is ischemic. No indication to anticoagulate for this.     Seen and Discussed with vascular surgery staff, Dr. Kandace Mcleod who is in agreement with the above.    Miryam Carrasco, [facility]  Vascular Surgery  Pager: [phone]  [email or web address]  Send message or 10 digit call back number Securely via [employer] with the Vocera Web Console (learn more here)

## 2023-10-03 NOTE — PROGRESS NOTES
CRRT STATUS NOTE    DATA:  Time:  5:35 PM  Pressures WNL:  YES  Filter Status:  WDL    Problems Reported/Alarms Noted:  none    Supplies Present:  YES    ASSESSMENT:  Patient Net Fluid Balance:  Net -513.2 ml since MN.  Vital Signs:  Temp: 98.7  F (37.1  C) Temp src: Axillary BP: 106/62 Pulse: 119   Resp: 17 SpO2: 92 % O2 Device: High Flow Nasal Cannula (HFNC)      Labs:  Last Comprehensive Metabolic Panel:  Sodium   Date Value Ref Range Status   10/03/2023 138 135 - 145 mmol/L Final     Comment:     Reference intervals for this test were updated on 09/26/2023 to more accurately reflect our healthy population. There may be differences in the flagging of prior results with similar values performed with this method. Interpretation of those prior results can be made in the context of the updated reference intervals.    02/08/2011 138 133 - 144 mmol/L Final     Potassium   Date Value Ref Range Status   10/03/2023 4.3 3.4 - 5.3 mmol/L Final   02/08/2011 3.9 3.4 - 5.3 mmol/L Final     Potassium Whole Blood   Date Value Ref Range Status   09/25/2023 2.5 (LL) 3.4 - 5.3 mmol/L Final     Potassium POCT   Date Value Ref Range Status   09/29/2023 3.7 3.5 - 5.0 mmol/L Final     Chloride   Date Value Ref Range Status   10/03/2023 105 98 - 107 mmol/L Final   02/08/2011 102 94 - 109 mmol/L Final     Carbon Dioxide   Date Value Ref Range Status   02/08/2011 26 20 - 32 mmol/L Final     Carbon Dioxide (CO2)   Date Value Ref Range Status   10/03/2023 19 (L) 22 - 29 mmol/L Final     Anion Gap   Date Value Ref Range Status   10/03/2023 14 7 - 15 mmol/L Final   02/08/2011 9 6 - 17 mmol/L Final     Glucose   Date Value Ref Range Status   10/03/2023 252 (H) 70 - 99 mg/dL Final   02/08/2011 102 (H) 60 - 99 mg/dL Final     GLUCOSE BY METER POCT   Date Value Ref Range Status   10/03/2023 221 (H) 70 - 99 mg/dL Final     Urea Nitrogen   Date Value Ref Range Status   10/03/2023 61.5 (H) 8.0 - 23.0 mg/dL Final   02/08/2011 17 7 - 30 mg/dL Final      Creatinine   Date Value Ref Range Status   10/03/2023 2.09 (H) 0.67 - 1.17 mg/dL Final   02/08/2011 1.12 0.66 - 1.25 mg/dL Final     Comment:     New IDMS-traceable calibration  beginning 5/1/08     GFR Estimate   Date Value Ref Range Status   10/03/2023 33 (L) >60 mL/min/1.73m2 Final   02/08/2011 68 >60 mL/min/1.7m2 Final     GFR, ESTIMATED POCT   Date Value Ref Range Status   09/24/2023 >60 >60 mL/min/1.73m2 Final     Calcium   Date Value Ref Range Status   10/03/2023 7.8 (L) 8.8 - 10.2 mg/dL Final   02/08/2011 9.2 8.5 - 10.4 mg/dL Final      Lab Results   Component Value Date    WBC 13.9 10/03/2023    WBC 7.0 02/18/2009     Lab Results   Component Value Date    RBC 2.30 10/03/2023    RBC 4.58 02/18/2009     Lab Results   Component Value Date    HGB 7.5 10/03/2023    HGB 15.1 02/18/2009     Lab Results   Component Value Date    HCT 23.2 10/03/2023    HCT 43.9 02/18/2009     No components found for: MCT  Lab Results   Component Value Date     10/03/2023    MCV 96 02/18/2009     Lab Results   Component Value Date    MCH 32.6 10/03/2023    MCH 33.0 02/18/2009     Lab Results   Component Value Date    MCHC 32.3 10/03/2023    MCHC 34.4 02/18/2009     Lab Results   Component Value Date    RDW 18.2 10/03/2023    RDW 12.4 02/18/2009     Lab Results   Component Value Date    PLT 57 10/03/2023     02/18/2009        Goals of Therapy:   ml/h; not meeting fluid removal goal d/t labile BP.    INTERVENTIONS:   Circuit restated this afternoon d/t clotting.    PLAN:  Continue fluid removal per goals of care as patient tolerates. Check filter daily. Change filter q72 hours and PRN. Please call CRRT resource RN on Vocera with any questions or concerns.

## 2023-10-03 NOTE — PROGRESS NOTES
STAFF NOTE:  Improving delirium    Exam notale for diffuse anasarca . edema  Sluggish in terms of following commands  Vascular access sites look fine  LLE is mottled, a wound vac on the calf    Labs show stable electrolytes on CRRT  No lactic acidosis  Stable to slightly decreased slight leukocytosis  Hgb drifting down, as is platelet count, despite ability to aggressively pull volume on CRRT yesterday  BAL growing 2 different candida species, also the aspergillus, but nothing bacterial is growing    On CXR, overall improvement in edema, but the right lower lobe continues to have some opacification potentially consistent with infection    This is a 68M hx HTN, TIA, s/p open contained AAA rupture s/p repair with prolonged suprarenal and juxtarenal clamp.  Now s/p multiple abdominal washouts showing a hemostatic AAA graft and no evidence of trnasmural colonic or small bowel ischemia.  Of note, also had an unsuccessfull LLE embolectomy without perfusion to the MARY JANE from the popliteal downand unsuccessful LLE embolectomy and multuiple abdominal washouts.      He is demonstrating marked improvement this morning.  Delirium and FiO2 requirements are decreased with our aggressive volume removal, and I anticipate extubating him later this afternoon after we replace the femoral dialysis line and put in a new RIJ dialysis line.  I anticipate that he should be able to eat orally once he is extubated, so I won't bother switching out the large-bore NGT for a small-bore feeding tube for now.  He should be ready for IHD, so will talk to nephrology about whether they want a tunneled line at this point, vs just exchanging his MAC introducer for a RIJ dialysis line.  Will not reorder TPN.  ID agreed with me that there is no need for voriconizole, and we should be able to stop all antibiotics today - formal BAL was negative, oxygenation is good.   The CXR does have unilateral opacification, but without other clear markers of pneumonia,  I think it ok to hold on antibiotics.  We can restart SQH.    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    ACUTE HYPOXIC RESPIRATORY FAILURE  -resolving with aggressive volume removal with CRRT.    -as above, relative lack of hypoxia, lack of purulent secretions, and a negative BAL all speak against active pneumonia.  Plus he has had 9 days of broad-spectrum antibiotics.  -plan extubation today, although I anticipate that he will still need BIPAP vs high flow nasal cannula.  -high dose steroids for both respiratory reasons (as per [facility] trial, NEJM 2023) and for hematologic reasons (thrombocytopenia in the context of a low 4T score and vascular graft after massive hemorrhage).    -continue volume removal with CRRT today to achieve net negative goal overnight of >1L, as per paradigm established in [facility] trial, NEJM 2006.  As above, once the circuit goes down, we can probalby just switch to IHD, but would like to see his volumes down a little more first.  Will clarify if nephrology wants a tunneled dialysis line, or would prefer to see whether there are signs of renal recovery after transition to IHD.  -ID agrees that the aspergillus & candida growing from BAL is almost certainly just colonization and does not warrant treatment at this time.      ISCHEMIC LLE:  -s/p fasciotomy.  Has not yet fully demarkated or show signs of gangrene; I anticipate will ultimately require above knee amputation based on angiographic site of the occlusion.  After learning more from family  about his functional status pre-op, I am more optimistic about his ability to rehab to a decent degree of functional independence.    THROMBOCYTOPENIA:  -my current presumption is that consumption from massive blood loss, vancomycin/beta-lactam-induced drug-iimune thrombocytopenia, and hemophagocytosis were all contributors.  Steroids and discontinuation of antibiotics should be adequate for now; if platelet count does not start to improve by tomorrow, then I will actively look for HIT.      ABDOMINAL AORTIC RUPTURE:  -s/p open repair  -abdomen closed  -IV & enteral narcotics available for analgesia    SEVERE PROTEIN-CALORIE MALNUTRITION:  -hold tube feeds for extubation, but assuming he does well, will likely allow a diet today or tomorrow - he has been stooling.  Allow today's bag of TPN to run out and don't reorder TPN    ACUTE KIDNEY FAILURE:  -contributing factors are massive hemorrhage, prolonged perirenal aortic clamp, possible ongoing infection  -probably continue CRRT today, at least until/if circuit goes down, pulling volume, with a target of 1-2L off, and I anticipate transition to IHD tomorrow or Thursday  -will clarify if nephrology thinks a tunneled line is warranted at this time, because once he comes off CRRT, I will probably want to remove the femoral line and get him internal jugular dialysis line    MISC:  -Code status is full code  -family updated at bedside  -SQH can restart today; PPI for steroids and intubation can probably stop tomorrow if he is durably extubated  -lines: RIJ MAC introducer will probably get exchanged for a dialysis line; femoral dialysis line will probably come out tomorrow, arterial line.  May need a new internal jugular 3 lumen for stable venous access if we replace his MAC introducer with an internal jugular dialysis line  -aparicio not necessary given anuria.  Intermittent straight caths are ok  -anticipate discharge to transitional care unit in >1 week, and ultimate  discharge to skilled nursing.  Amputation probably next week or so, once he's more clearly stable from a cardiopulmonary standpoint    Billing statement: 49min of critical care time; spent in an initial review of imaging, labs, physical exam, and discussion of the patient with my own team and the extended care team including the primary service. Based on this patient's presentation / recent intervention and my bedside assessment, I felt there was or is a reasonably high probability of imminent or life-threatening deterioration today or tonight for respiratory or hemodynamic reasons.   My overall critical care time, as described in detail above, includes such things as coordination of care, arrhythmia and hemodynamics management with infusions of medicines, respiratory management, fluid therapy including fluid boluses, and pain and sedation therapy. This time excludes time I spent personally performing or supervising procedures for this patient.    SHAMEKA Narvaez MD  Clinical   Anesthesia / Critical Care  *[phone]

## 2023-10-03 NOTE — PLAN OF CARE
Goal Outcome Evaluation:       D. Hr 100- on precedex at 0.6 mcg/kg- temp 100.9 on tylenol and CRRT. Pulling 50 ml/hr - pain med fentanyl at 150 mg/hr - with bolus of 50 x2 -  oxycodone po given 10 mg x 2 - cont to have pain - precedex stopped with temp down to normal post gtt- bp low - unable to pull on crrt- ( md aware)) hr increased to 125 bpm - extubated at 1200- on high flow n/c 50/60  sats 95%  uses Incentive spirometry often to 550. Good cough-   A rr 20-30s- sats 88-96%  hr increased to 125- bp low with inability to pull fluid. - confused moves all extremities except L leg- temp 98.7 ax-  P cont to monitor - fluid removal goal 7589-3455/24 hours.

## 2023-10-03 NOTE — PLAN OF CARE
Major Shift Events: Pt sedated on precedex 0.6 mcg/kg/hr, fentanyl 150 mcg/hr. Drowsy but restless, constantly lifting arms off bed overnight. Follows simple commands. Intermittently endorses pain in abdomen and scrotum. Tmax 100.9, tylenol given q 6 hrs. HR SR 70s-100s w/frequent ectopy. Vent CPAP/PS 6/8 overnight, Vt >1200 mL at times this AM, currently on FiO2 50%. CVP 11-12. Tolerating net negative 50 mL/hr via CRRT. Anuric, bladder scan 28 mL this AM. TF infusing at 30 mL/hr. Wound vac's to abd and LLE with no measurable output. Pulses palpable BUE and RLE. LLE unchanged.     Plan: Continue with plan of care. For vital signs and complete assessments, please see documentation flowsheets.     Goal Outcome Evaluation:  Plan of Care Reviewed With: patient  Overall Patient Progress: no change

## 2023-10-04 ENCOUNTER — APPOINTMENT (OUTPATIENT)
Dept: GENERAL RADIOLOGY | Facility: CLINIC | Age: 68
DRG: 268 | End: 2023-10-04
Payer: COMMERCIAL

## 2023-10-04 ENCOUNTER — APPOINTMENT (OUTPATIENT)
Dept: GENERAL RADIOLOGY | Facility: CLINIC | Age: 68
DRG: 268 | End: 2023-10-04
Attending: SURGERY
Payer: COMMERCIAL

## 2023-10-04 LAB
ALBUMIN SERPL BCG-MCNC: 2.7 G/DL (ref 3.5–5.2)
ALP SERPL-CCNC: 234 U/L (ref 40–129)
ALT SERPL W P-5'-P-CCNC: 30 U/L (ref 0–70)
ANION GAP SERPL CALCULATED.3IONS-SCNC: 12 MMOL/L (ref 7–15)
ANION GAP SERPL CALCULATED.3IONS-SCNC: 13 MMOL/L (ref 7–15)
ANION GAP SERPL CALCULATED.3IONS-SCNC: 13 MMOL/L (ref 7–15)
AST SERPL W P-5'-P-CCNC: 80 U/L (ref 0–45)
BILIRUB DIRECT SERPL-MCNC: 0.59 MG/DL (ref 0–0.3)
BILIRUB SERPL-MCNC: 0.9 MG/DL
BUN SERPL-MCNC: 55.2 MG/DL (ref 8–23)
BUN SERPL-MCNC: 63.4 MG/DL (ref 8–23)
BUN SERPL-MCNC: 63.4 MG/DL (ref 8–23)
CA-I BLD-MCNC: 3.8 MG/DL (ref 4.4–5.2)
CA-I BLD-MCNC: 4.4 MG/DL (ref 4.4–5.2)
CA-I BLD-MCNC: 4.4 MG/DL (ref 4.4–5.2)
CALCIUM SERPL-MCNC: 7.9 MG/DL (ref 8.8–10.2)
CALCIUM SERPL-MCNC: 8.1 MG/DL (ref 8.8–10.2)
CALCIUM SERPL-MCNC: 8.1 MG/DL (ref 8.8–10.2)
CHLORIDE SERPL-SCNC: 105 MMOL/L (ref 98–107)
CHLORIDE SERPL-SCNC: 106 MMOL/L (ref 98–107)
CHLORIDE SERPL-SCNC: 106 MMOL/L (ref 98–107)
CREAT SERPL-MCNC: 1.83 MG/DL (ref 0.67–1.17)
CREAT SERPL-MCNC: 1.93 MG/DL (ref 0.67–1.17)
CREAT SERPL-MCNC: 1.93 MG/DL (ref 0.67–1.17)
DEPRECATED HCO3 PLAS-SCNC: 19 MMOL/L (ref 22–29)
DEPRECATED HCO3 PLAS-SCNC: 19 MMOL/L (ref 22–29)
DEPRECATED HCO3 PLAS-SCNC: 21 MMOL/L (ref 22–29)
EGFRCR SERPLBLD CKD-EPI 2021: 37 ML/MIN/1.73M2
EGFRCR SERPLBLD CKD-EPI 2021: 37 ML/MIN/1.73M2
EGFRCR SERPLBLD CKD-EPI 2021: 39 ML/MIN/1.73M2
ERYTHROCYTE [DISTWIDTH] IN BLOOD BY AUTOMATED COUNT: 18.1 % (ref 10–15)
ERYTHROCYTE [DISTWIDTH] IN BLOOD BY AUTOMATED COUNT: 18.3 % (ref 10–15)
GLUCOSE BLDC GLUCOMTR-MCNC: 107 MG/DL (ref 70–99)
GLUCOSE BLDC GLUCOMTR-MCNC: 110 MG/DL (ref 70–99)
GLUCOSE BLDC GLUCOMTR-MCNC: 112 MG/DL (ref 70–99)
GLUCOSE BLDC GLUCOMTR-MCNC: 132 MG/DL (ref 70–99)
GLUCOSE BLDC GLUCOMTR-MCNC: 132 MG/DL (ref 70–99)
GLUCOSE BLDC GLUCOMTR-MCNC: 134 MG/DL (ref 70–99)
GLUCOSE BLDC GLUCOMTR-MCNC: 167 MG/DL (ref 70–99)
GLUCOSE SERPL-MCNC: 114 MG/DL (ref 70–99)
GLUCOSE SERPL-MCNC: 122 MG/DL (ref 70–99)
GLUCOSE SERPL-MCNC: 122 MG/DL (ref 70–99)
HCT VFR BLD AUTO: 23.3 % (ref 40–53)
HCT VFR BLD AUTO: 23.5 % (ref 40–53)
HGB BLD-MCNC: 7.6 G/DL (ref 13.3–17.7)
HGB BLD-MCNC: 7.6 G/DL (ref 13.3–17.7)
LACTATE SERPL-SCNC: 0.8 MMOL/L (ref 0.7–2)
LACTATE SERPL-SCNC: 1.7 MMOL/L (ref 0.7–2)
LACTATE SERPL-SCNC: 1.8 MMOL/L (ref 0.7–2)
MAGNESIUM SERPL-MCNC: 2.5 MG/DL (ref 1.7–2.3)
MAGNESIUM SERPL-MCNC: 2.5 MG/DL (ref 1.7–2.3)
MCH RBC QN AUTO: 32.5 PG (ref 26.5–33)
MCH RBC QN AUTO: 32.8 PG (ref 26.5–33)
MCHC RBC AUTO-ENTMCNC: 32.3 G/DL (ref 31.5–36.5)
MCHC RBC AUTO-ENTMCNC: 32.6 G/DL (ref 31.5–36.5)
MCV RBC AUTO: 100 FL (ref 78–100)
MCV RBC AUTO: 100 FL (ref 78–100)
NT-PROBNP SERPL-MCNC: 5122 PG/ML (ref 0–900)
PHOSPHATE SERPL-MCNC: 4.8 MG/DL (ref 2.5–4.5)
PHOSPHATE SERPL-MCNC: 5.7 MG/DL (ref 2.5–4.5)
PLATELET # BLD AUTO: 75 10E3/UL (ref 150–450)
PLATELET # BLD AUTO: 75 10E3/UL (ref 150–450)
POTASSIUM SERPL-SCNC: 4.6 MMOL/L (ref 3.4–5.3)
POTASSIUM SERPL-SCNC: 4.6 MMOL/L (ref 3.4–5.3)
POTASSIUM SERPL-SCNC: 4.8 MMOL/L (ref 3.4–5.3)
PROT SERPL-MCNC: 5.3 G/DL (ref 6.4–8.3)
RBC # BLD AUTO: 2.32 10E6/UL (ref 4.4–5.9)
RBC # BLD AUTO: 2.34 10E6/UL (ref 4.4–5.9)
SODIUM SERPL-SCNC: 138 MMOL/L (ref 135–145)
WBC # BLD AUTO: 14.4 10E3/UL (ref 4–11)
WBC # BLD AUTO: 16.7 10E3/UL (ref 4–11)

## 2023-10-04 PROCEDURE — 999N000065 XR ABDOMEN PORT 1 VIEW

## 2023-10-04 PROCEDURE — 83735 ASSAY OF MAGNESIUM: CPT | Performed by: INTERNAL MEDICINE

## 2023-10-04 PROCEDURE — 71045 X-RAY EXAM CHEST 1 VIEW: CPT

## 2023-10-04 PROCEDURE — 999N000215 HC STATISTIC HFNC ADULT NON-CPAP

## 2023-10-04 PROCEDURE — 83605 ASSAY OF LACTIC ACID: CPT | Performed by: NURSE PRACTITIONER

## 2023-10-04 PROCEDURE — 84100 ASSAY OF PHOSPHORUS: CPT | Performed by: INTERNAL MEDICINE

## 2023-10-04 PROCEDURE — 250N000011 HC RX IP 250 OP 636: Mod: JZ | Performed by: SURGERY

## 2023-10-04 PROCEDURE — P9047 ALBUMIN (HUMAN), 25%, 50ML: HCPCS

## 2023-10-04 PROCEDURE — 82248 BILIRUBIN DIRECT: CPT | Performed by: INTERNAL MEDICINE

## 2023-10-04 PROCEDURE — 85027 COMPLETE CBC AUTOMATED: CPT | Performed by: INTERNAL MEDICINE

## 2023-10-04 PROCEDURE — 250N000011 HC RX IP 250 OP 636

## 2023-10-04 PROCEDURE — 250N000009 HC RX 250

## 2023-10-04 PROCEDURE — 74018 RADEX ABDOMEN 1 VIEW: CPT | Mod: 26 | Performed by: RADIOLOGY

## 2023-10-04 PROCEDURE — 82330 ASSAY OF CALCIUM: CPT | Performed by: INTERNAL MEDICINE

## 2023-10-04 PROCEDURE — 71045 X-RAY EXAM CHEST 1 VIEW: CPT | Mod: 26 | Performed by: RADIOLOGY

## 2023-10-04 PROCEDURE — 250N000011 HC RX IP 250 OP 636: Mod: JZ | Performed by: ANESTHESIOLOGY

## 2023-10-04 PROCEDURE — 999N000157 HC STATISTIC RCP TIME EA 10 MIN

## 2023-10-04 PROCEDURE — 44500 INTRO GASTROINTESTINAL TUBE: CPT

## 2023-10-04 PROCEDURE — 250N000013 HC RX MED GY IP 250 OP 250 PS 637: Performed by: STUDENT IN AN ORGANIZED HEALTH CARE EDUCATION/TRAINING PROGRAM

## 2023-10-04 PROCEDURE — 90947 DIALYSIS REPEATED EVAL: CPT

## 2023-10-04 PROCEDURE — 200N000002 HC R&B ICU UMMC

## 2023-10-04 PROCEDURE — 80069 RENAL FUNCTION PANEL: CPT | Performed by: NURSE PRACTITIONER

## 2023-10-04 PROCEDURE — 83880 ASSAY OF NATRIURETIC PEPTIDE: CPT

## 2023-10-04 PROCEDURE — 250N000013 HC RX MED GY IP 250 OP 250 PS 637

## 2023-10-04 PROCEDURE — 250N000009 HC RX 250: Performed by: NURSE PRACTITIONER

## 2023-10-04 PROCEDURE — 99291 CRITICAL CARE FIRST HOUR: CPT | Mod: GC | Performed by: ANESTHESIOLOGY

## 2023-10-04 PROCEDURE — 82330 ASSAY OF CALCIUM: CPT | Performed by: NURSE PRACTITIONER

## 2023-10-04 PROCEDURE — 250N000013 HC RX MED GY IP 250 OP 250 PS 637: Performed by: ANESTHESIOLOGY

## 2023-10-04 PROCEDURE — 250N000009 HC RX 250: Performed by: INTERNAL MEDICINE

## 2023-10-04 RX ORDER — METHOCARBAMOL 500 MG/1
500 TABLET, FILM COATED ORAL 3 TIMES DAILY PRN
Status: DISCONTINUED | OUTPATIENT
Start: 2023-10-04 | End: 2023-10-22

## 2023-10-04 RX ORDER — HYDROXYZINE HYDROCHLORIDE 25 MG/1
50 TABLET, FILM COATED ORAL EVERY 6 HOURS PRN
Status: DISCONTINUED | OUTPATIENT
Start: 2023-10-04 | End: 2023-10-12

## 2023-10-04 RX ORDER — HYDROXYZINE HYDROCHLORIDE 25 MG/1
25 TABLET, FILM COATED ORAL EVERY 6 HOURS PRN
Status: DISCONTINUED | OUTPATIENT
Start: 2023-10-04 | End: 2023-10-16

## 2023-10-04 RX ORDER — NOREPINEPHRINE BITARTRATE 0.06 MG/ML
INJECTION, SOLUTION INTRAVENOUS
Status: COMPLETED
Start: 2023-10-04 | End: 2023-10-04

## 2023-10-04 RX ORDER — ACETAMINOPHEN 325 MG/10.15ML
975 LIQUID ORAL EVERY 6 HOURS
Status: DISCONTINUED | OUTPATIENT
Start: 2023-10-04 | End: 2023-10-05

## 2023-10-04 RX ORDER — ALBUMIN (HUMAN) 12.5 G/50ML
SOLUTION INTRAVENOUS
Status: COMPLETED
Start: 2023-10-04 | End: 2023-10-04

## 2023-10-04 RX ORDER — NOREPINEPHRINE BITARTRATE 0.06 MG/ML
.01-.6 INJECTION, SOLUTION INTRAVENOUS CONTINUOUS
Status: DISCONTINUED | OUTPATIENT
Start: 2023-10-04 | End: 2023-10-06

## 2023-10-04 RX ORDER — LIDOCAINE HYDROCHLORIDE 20 MG/ML
5 SOLUTION OROPHARYNGEAL ONCE
Status: COMPLETED | OUTPATIENT
Start: 2023-10-04 | End: 2023-10-04

## 2023-10-04 RX ORDER — ALBUMIN (HUMAN) 12.5 G/50ML
12.5 SOLUTION INTRAVENOUS ONCE
Status: COMPLETED | OUTPATIENT
Start: 2023-10-04 | End: 2023-10-04

## 2023-10-04 RX ORDER — QUETIAPINE FUMARATE 25 MG/1
25 TABLET, FILM COATED ORAL AT BEDTIME
Status: DISCONTINUED | OUTPATIENT
Start: 2023-10-04 | End: 2023-10-08

## 2023-10-04 RX ADMIN — THIAMINE HCL TAB 100 MG 100 MG: 100 TAB at 19:34

## 2023-10-04 RX ADMIN — CALCIUM CHLORIDE, MAGNESIUM CHLORIDE, SODIUM CHLORIDE, SODIUM BICARBONATE, POTASSIUM CHLORIDE AND SODIUM PHOSPHATE DIBASIC DIHYDRATE 12.5 ML/KG/HR: 3.68; 3.05; 6.34; 3.09; .314; .187 INJECTION INTRAVENOUS at 01:54

## 2023-10-04 RX ADMIN — HEPARIN SODIUM 5000 UNITS: 5000 INJECTION, SOLUTION INTRAVENOUS; SUBCUTANEOUS at 23:36

## 2023-10-04 RX ADMIN — GABAPENTIN 100 MG: 250 SUSPENSION ORAL at 05:45

## 2023-10-04 RX ADMIN — QUETIAPINE FUMARATE 25 MG: 25 TABLET ORAL at 21:39

## 2023-10-04 RX ADMIN — OXYCODONE HYDROCHLORIDE 10 MG: 10 TABLET ORAL at 03:43

## 2023-10-04 RX ADMIN — HYDROXYZINE HYDROCHLORIDE 50 MG: 25 TABLET, FILM COATED ORAL at 08:25

## 2023-10-04 RX ADMIN — HEPARIN SODIUM 5000 UNITS: 5000 INJECTION, SOLUTION INTRAVENOUS; SUBCUTANEOUS at 15:58

## 2023-10-04 RX ADMIN — DEXMEDETOMIDINE HYDROCHLORIDE 0.4 MCG/KG/HR: 400 INJECTION INTRAVENOUS at 20:45

## 2023-10-04 RX ADMIN — GABAPENTIN 100 MG: 250 SUSPENSION ORAL at 14:37

## 2023-10-04 RX ADMIN — DEXMEDETOMIDINE HYDROCHLORIDE 0.6 MCG/KG/HR: 400 INJECTION INTRAVENOUS at 09:24

## 2023-10-04 RX ADMIN — HYDROMORPHONE HYDROCHLORIDE 0.5 MG: 1 INJECTION, SOLUTION INTRAMUSCULAR; INTRAVENOUS; SUBCUTANEOUS at 21:39

## 2023-10-04 RX ADMIN — ALBUMIN HUMAN 12.5 G: 0.25 SOLUTION INTRAVENOUS at 13:34

## 2023-10-04 RX ADMIN — ACETAMINOPHEN 975 MG: 325 SOLUTION ORAL at 19:34

## 2023-10-04 RX ADMIN — INSULIN ASPART 2 UNITS: 100 INJECTION, SOLUTION INTRAVENOUS; SUBCUTANEOUS at 23:38

## 2023-10-04 RX ADMIN — ACETAMINOPHEN 650 MG: 325 SOLUTION ORAL at 08:18

## 2023-10-04 RX ADMIN — DEXMEDETOMIDINE HYDROCHLORIDE 0.4 MCG/KG/HR: 400 INJECTION INTRAVENOUS at 15:51

## 2023-10-04 RX ADMIN — GABAPENTIN 100 MG: 250 SUSPENSION ORAL at 21:41

## 2023-10-04 RX ADMIN — METHOCARBAMOL 500 MG: 500 TABLET, FILM COATED ORAL at 18:45

## 2023-10-04 RX ADMIN — SENNOSIDES AND DOCUSATE SODIUM 1 TABLET: 50; 8.6 TABLET ORAL at 19:34

## 2023-10-04 RX ADMIN — OXYCODONE HYDROCHLORIDE 10 MG: 10 TABLET ORAL at 12:06

## 2023-10-04 RX ADMIN — NOREPINEPHRINE BITARTRATE 0.03 MCG/KG/MIN: 0.06 INJECTION, SOLUTION INTRAVENOUS at 13:36

## 2023-10-04 RX ADMIN — ACETAMINOPHEN 975 MG: 325 SOLUTION ORAL at 14:37

## 2023-10-04 RX ADMIN — ACETAMINOPHEN 650 MG: 325 SOLUTION ORAL at 01:49

## 2023-10-04 RX ADMIN — CALCIUM CHLORIDE, MAGNESIUM CHLORIDE, SODIUM CHLORIDE, SODIUM BICARBONATE, POTASSIUM CHLORIDE AND SODIUM PHOSPHATE DIBASIC DIHYDRATE 12.5 ML/KG/HR: 3.68; 3.05; 6.34; 3.09; .314; .187 INJECTION INTRAVENOUS at 06:02

## 2023-10-04 RX ADMIN — Medication 40 MG: at 08:19

## 2023-10-04 RX ADMIN — OXYCODONE HYDROCHLORIDE 10 MG: 10 TABLET ORAL at 08:25

## 2023-10-04 RX ADMIN — HYDROXYZINE HYDROCHLORIDE 50 MG: 25 TABLET, FILM COATED ORAL at 19:34

## 2023-10-04 RX ADMIN — CALCIUM CHLORIDE, MAGNESIUM CHLORIDE, SODIUM CHLORIDE, SODIUM BICARBONATE, POTASSIUM CHLORIDE AND SODIUM PHOSPHATE DIBASIC DIHYDRATE 12.5 ML/KG/HR: 3.68; 3.05; 6.34; 3.09; .314; .187 INJECTION INTRAVENOUS at 10:38

## 2023-10-04 RX ADMIN — HYDROCORTISONE SODIUM SUCCINATE 50 MG: 100 INJECTION, POWDER, FOR SOLUTION INTRAMUSCULAR; INTRAVENOUS at 05:42

## 2023-10-04 RX ADMIN — ALBUMIN HUMAN 12.5 G: 0.25 SOLUTION INTRAVENOUS at 15:01

## 2023-10-04 RX ADMIN — HEPARIN SODIUM 5000 UNITS: 5000 INJECTION, SOLUTION INTRAVENOUS; SUBCUTANEOUS at 00:04

## 2023-10-04 RX ADMIN — LIDOCAINE HYDROCHLORIDE 5 ML: 20 SOLUTION ORAL; TOPICAL at 09:30

## 2023-10-04 RX ADMIN — HYDROCORTISONE SODIUM SUCCINATE 50 MG: 100 INJECTION, POWDER, FOR SOLUTION INTRAMUSCULAR; INTRAVENOUS at 19:33

## 2023-10-04 RX ADMIN — Medication 5 MG: at 18:07

## 2023-10-04 RX ADMIN — THIAMINE HCL TAB 100 MG 100 MG: 100 TAB at 08:19

## 2023-10-04 RX ADMIN — HYDROMORPHONE HYDROCHLORIDE 0.5 MG: 1 INJECTION, SOLUTION INTRAMUSCULAR; INTRAVENOUS; SUBCUTANEOUS at 12:06

## 2023-10-04 RX ADMIN — Medication 10 ML: at 08:19

## 2023-10-04 RX ADMIN — OXYCODONE HYDROCHLORIDE 10 MG: 10 TABLET ORAL at 23:36

## 2023-10-04 RX ADMIN — OXYCODONE HYDROCHLORIDE 10 MG: 10 TABLET ORAL at 18:45

## 2023-10-04 RX ADMIN — HEPARIN SODIUM 5000 UNITS: 5000 INJECTION, SOLUTION INTRAVENOUS; SUBCUTANEOUS at 08:19

## 2023-10-04 ASSESSMENT — ACTIVITIES OF DAILY LIVING (ADL)
ADLS_ACUITY_SCORE: 43

## 2023-10-04 NOTE — PROCEDURES
Small Bowel Feeding Tube Placement Assessment  Reason for Feeding Tube Placement: administration of medications and nutrition  Cortrak Start Time: 0930   Cortrak End Time: 1000  Medicine Delivered During Procedure: 2% viscous lidocaine gel   Placement Successful: yes per Cortrak tracing pending AXR confirmation    Large bore NGT removed.  Procedure Complications: none  Final Placement Sameer at exit of nare:  93 cm  Face to Face time with patient: 30 minutes    Bridle Placement:   Reason for bridle placement: securement of nasoenteric feeding tube    Medicine delivered during procedure: lubricating jelly  Procedure: Successful   Location of top of clip on FT: @ 95 cm marker   Condition of nose/skin at time of bridle placement: Unremarkable   Face to Face time with patient: < 5 minutes.    Natty Zeng RD, LD, CNSC  [address] pager: [phone]  Ascom: [phone]  Weekend/Holiday RD pager [phone]

## 2023-10-04 NOTE — PROGRESS NOTES
CRRT STATUS NOTE    DATA:  Time:  5:51 AM  Pressures WNL:  YES  Filter Status:  WDL    Problems Reported/Alarms Noted:  None.    Supplies Present:  YES    ASSESSMENT:  Patient Net Fluid Balance:  -1.2L at midnight, at goal. Net -588 ml @ 0600.  Vital Signs:  HR 88, /64 (85), 96% on HFNC 40L - 50%   Labs:  K 4.6, Mg 2.5, Phos 4.8, iCa 4.4, Hgb 7.6, Plt 75   Goals of Therapy:  50-100cc/h net negative    INTERVENTIONS:   R) femoral dressing changed w/ bedside nurse.    PLAN:  Continue to monitor circuit daily and change set q72 hours or PRN for clotting/clogging. Please call CRRT RN with any quesitons/problems.     Possible transition to HD.

## 2023-10-04 NOTE — PROGRESS NOTES
STAFF NOTE:  Extubated to high flow oxygen yesterday  Worsening delirium  Oxygen requirements have improved overnight with volume removal, now down to 30L and 50% FiO2    Diffuse anasarca is better.   Vascular access sites look fine; the right internal jugular introducer site is fine, the femoral dialysis line site still looks ok; arterial lines are ok  LLE is mottled, stating to demarcate itself better now up to just below the knee.  There are some blistering on the thigh that is healing ok and appears well-perfused.  a wound vac on the calf has normal output    Labs show expected normalization of electrolytes with CRRT  Transaminases are ok, although as expected, AST is slower to noramlize than ALT  Platelet count is starting to normalize now finally; however, all counts are up on CBC, suggesting some concnetration effet.  This is supported by the fact that total protein is rising even as serum albumin stays stable    CXR shows migratory consolidations, which I interpret as atelectasis.  Overall wetness is improving    This is a 68M hx HTN, TIA, s/p open contained AAA rupture s/p repair with prolonged suprarenal and juxtarenal clamp.  Also had an unsuccessfull LLE embolectomy without perfusion to the MARY JANE from the popliteal downwand.      Today we will continue volume removal with the CRRT; once the circuit goes down, we should be able to remove the femoral dialysis line and place a new internal jugular dialysis line and transition to IHD.    Will replace his large-bore NGT with a small-bore feeding tube.  It does not need to be post-pyloric.  Start scheduled seroquel for sleep.  We can start to wean steroids now that platelet count is improving.        I am presuming that his amputation will be early next week, possibly Monday.  For now, I see no signs of wet gangrene-associated infection of the leg, but it is certainly at risk of causing infectious problems the longer it remains.    METABOLIC ENCEPHALOPATHY /  DELIRIUM:  -melatonin for sleep and neuroprotective effects; can add scheduled at bedtime seroquel  -Precedex infusion as per Kevin, [facility] 2018, even while extubated.  Should provide analgesia without respiratory depression and minimize his narcotic needs  -replace large bore NG with small bore - may have some marginal benefit in terms of delirium and respiratory status because should be associated with less secretions  -has needed restraints to keep from pulling at lines    ACUTE HYPOXIC RESPIRATORY FAILURE  -resolving with aggressive volume removal with CRRT.    -continue to wean high flow nasal cannula  -ok to start weaning steroids, even though this is not technically how they were used in the studies showing benefit for respiratory reasons  -continue volume removal with CRRT for as long as the circuit lasts.  As above, once the circuit goes down, we can probalby just switch to IHD, but would like to see his volumes down a little more first.  Still trying to clarify if nephrology wants a tunneled dialysis line now, or would prefer to see whether there are signs of renal recovery after transition to IHD.  -ID agrees that the aspergillus & candida growing from BAL is almost certainly just colonization and does not warrant treatment at this time  No positive bacteria on BAL means no need for respiratory antibiotics.  CXR is also clearing up     ISCHEMIC LLE:  -s/p fasciotomy.  Starting to demarcate / become gangrenous now, although I don't see definite signs of superimposed infection.  I anticipate above knee amputation early next week.    -gabapentin 100tid for neuropathic pain  -I think there would be benefit to a continuous perineural catheter for him prior to amputation, so will touch base with the acute pain service to see if they think he would be a candidate, and if so, what appropriate timing might be    THROMBOCYTOPENIA:  -seems to be improving with time, steroids and discontinuation of beta-lactam  antibiotics.  NTD      ABDOMINAL AORTIC RUPTURE:  -s/p open repair  -abdomen closed  -IV & enteral narcotics available for analgesia.  Also has scheduled tylenol and precedex drip    SEVERE PROTEIN-CALORIE MALNUTRITION:  -as above, place small-bore feeding tube.  Does not need to be post-pyloric.  Ok to restart tube feeds and discontinue TPN.    -tube feeds will be Pivot 1.5 at 50ml/h as goal per the dietician    ACUTE KIDNEY FAILURE:  -contributing factors are massive hemorrhage, prolonged perirenal aortic clamp, possible ongoing infection / inflammation from the gangrenous leg  -continue CRRT today at least until/if circuit goes down, pulling volume, with a target of 1-2L off, and I anticipate transition to IHD  -will clarify if nephrology thinks a tunneled line is warranted at this time, because once he comes off CRRT, I will probably want to remove the femoral line and get him internal jugular dialysis line    MISC:  -Code status is full code  -family updated at bedside  -SQH rather than lovenox given renal dysfunction; PPI can continue until steroids have completed   -lines: RIJ MAC introducer will probably get exchanged for a dialysis line; femoral dialysis line will come out once off CRRT, arterial line.  May need a new left internal jugular 3 lumen for stable venous access if we replace his MAC introducer with an internal jugular dialysis line  -aparicio not necessary given anuria.  Intermittent straight caths are ok  -anticipate discharge to transitional care unit in >1 week, and ultimate discharge to skilled nursing.  Amputation probably Monday or so    Billing statement: 41min of critical care time; spent in an initial review of imaging, labs, physical exam, and discussion of the patient with my own team and the extended care team including the primary service. Based on this patient's presentation / recent intervention and my bedside assessment, I felt there was or is a reasonably high probability of  imminent or life-threatening deterioration today or tonight for respiratory or hemodynamic reasons.   My overall critical care time, as described in detail above, includes such things as coordination of care, arrhythmia and hemodynamics management with infusions of medicines, respiratory management, fluid therapy including fluid boluses, and pain and sedation therapy. This time excludes time I spent personally performing or supervising procedures for this patient.    SHAMEKA Narvaez MD  Clinical   Anesthesia / Critical Care  *[phone]

## 2023-10-04 NOTE — PROGRESS NOTES
Per SICU, CRRT not restarted with filter clotting this evening. Concern for hypovolemia. Dr Narvaez wanted to keep off for the night and reassess for CRRT vs HD need in AM.     Bedside RN took set down at 1400 when it clotted. Pt net -1L so far today. With 10cc UO, 2L CRRT UF removed. And moderately hypotensive: B/P: 88/60, T: 99, P: 93, R: 12 on no pressors. Albumin was given this evening.     Follow-up for CRRT Vs HD with Nephrology/SICU tomorrow.

## 2023-10-04 NOTE — PLAN OF CARE
"Major Shift Events: Pt delirious and restless overnight, slept for approximately one hour around 0200. Voice hoarse whisper, does not interact much verbally, but frequently moans and says \"oh god\" and \"okay.\" Precedex infusing at 0.6 mcg/kg/hr. Oxycodone given q 4 hr, dilaudid x1 in addition to scheduled tylenol and gabapentin. Afebrile. HR 70s-100s, frequent ectopy while pt awake and restless. HFNC weaned down to 50% 30 lpm. BP stable. CVP 11-12. Tolerating fluid removal with CRRT net negative 100 mL/hr.     Plan: Continue with plan of care. For vital signs and complete assessments, please see documentation flowsheets.     Goal Outcome Evaluation:  Plan of Care Reviewed With: patient  Overall Patient Progress: improving       "

## 2023-10-04 NOTE — PLAN OF CARE
Problem: CRRT (Continuous Renal Replacement Therapy)  Goal: Safe, Effective Therapy Delivery  Outcome: Not Progressing   Goal Outcome Evaluation:  D. On crrt- pulling 100cc/hr- as ordered- bp at 1300 low pull reduced to 100 then decreased to 0    , bp cont to be low 80/40  per a line (See flow sheet) - precedex  stopped MD notified-  albumin given - bp improved -  cont with no pull of fluids on CRRT- Md aware,   A dialyzed clotted at 1500 Md aware, bp cont to be low 90/50 hr 100  I stop CRRT for now - re evaluate in am

## 2023-10-04 NOTE — PROGRESS NOTES
Nephrology Progress Note  10/04/2023       Alfredo Burnham is a 68 yom with complex hx of TIA, HTN, blindness who presented to [facility] 9/24 with severe abdominal pain radiating to flank and back, CT revealed AAA with a contained rupture.  Taken to OR for aortobiiliac bypass and resection of ruptured pararenal aneurysm.   Post op course complicated by hypotension requiring pressors and metabolic acidosis.  Baseline Cr 1.0 on presentation but on the rise to >5 at time of renal consult for MAYA management and possible RRT.       Interval History :   Mr Burhnam continues on CRRT with 4k baths, negative 1.2L yesterday and down appropriately in wt.  Hemodynamics stable but needed nearly 4L of UF to achieve lower end of our 1-2L net negative goal so would continue CRRT until we are a bit closer to euvolemic and intake comes down a bit (on TPN and TF currently).  Agree with moving HD line to internal jugular if possible, no signs of renal recovery yet but with BP's stabilizing we will watch for it.  Extubated but resp status still tenuous.      Assessment & Recommendations:   MAYA-Baseline Cr 1.0, ordered UA.  CT showed benign cyst but otherwise normal kidneys.  Cr on the rise since surgery, did receive contrast for CTA.  Urine microscopy showed granular casts suggesting ATN which will recover with time and stabilizing hemodynamics.  Started on CRRT 9/30 for volume and clearance with minimal UOP and rising Cr.                  -Started CRRT 9/30 for volume and clearance.  4k baths, 50-100cc/h net negative.                   -Dialysis consent signed and scanned into media tab.      Volume-Total body volume overloaded but much of it is likely 3rd spaced with low albumin and acute illness.  Net even yesterday, trying to be 1-2L net negative, ordered for 50-100cc/h net negative.  Is about 10kg up in wt over the past week, will try to get closer to euvolemic      Electrolytes-K 4.8, bicarb 19 with borderline AG.  Will follow  "with CRRT and consider increasing dose if it remains low, Na 138.        BMD-Ca 8.1, Mg and Phos mildly up.       Anemia-Hgb 7.6 and stable, ~14 on presentation, acute management per team.       Nutrition-On TPN started 9/27     Time spent: 40 minutes on this date of encounter for chart review, physical exam, medical decision making and co-ordination of care.      Seen and discussed with Dr Oconnor     Recommendations were communicated to primary team via verbal communication.        ALTAGRACIA Jiménez CNS  Clinical Nurse Specialist  [phone]    Review of Systems:   I reviewed the following systems:  ROS not done due to lethargy    Physical Exam:   I/O last 3 completed shifts:  In: 1973.18 [I.V.:598.18; NG/GT:375]  Out: 3546.9 [Urine:50; Drains:100; Other:3396.9]   /81 (BP Location: Right arm)   Pulse 86   Temp 98.4  F (36.9  C) (Axillary)   Resp 21   Ht 1.727 m (5' 8\")   Wt 89.1 kg (196 lb 6.9 oz)   SpO2 96%   BMI 29.87 kg/m       GENERAL APPEARANCE: Extubated, lethargic, in no distress.   EYES: No scleral icterus  Pulmonary: On vent 40%/8 of PEEP  CV: Regular rhythm, normal rate   - Edema +2-3 generalized, ++ scrotal edema.    GI: distended, nontender  MS: no evidence of inflammation in joints, no muscle tenderness  : No Lu  SKIN: no rash, warm, dry  NEURO: No focal deficits.         Labs:   All labs reviewed by me  Electrolytes/Renal -   Recent Labs   Lab Test 10/04/23  0334 10/04/23  0333 10/03/23  2351 10/03/23  1953/23  1214 10/03/23  1208     138  --   --  138  --  138   POTASSIUM 4.6  4.6  --   --  4.2  --  4.3   CHLORIDE 106  106  --   --  105  --  105   CO2 19*  19*  --   --  18*  --  19*   BUN 63.4*  63.4*  --   --  66.6*  --  61.5*   CR 1.93*  1.93*  --   --  1.94*  --  2.09*   *  122* 112* 110* 201*  234*   < > 252*   OMAR 8.1*  8.1*  --   --  7.7*  --  7.8*   MAG 2.5*  --   --  2.4*  --  2.5*   PHOS 4.8*  --   --  3.8  --  4.3    < > = values in this " interval not displayed.       CBC -   Recent Labs   Lab Test 10/04/23  0334 10/03/23  1953/23  1208   WBC 16.7* 16.2* 13.9*   HGB 7.6* 7.6* 7.5*   PLT 75* 66* 57*       LFTs -   Recent Labs   Lab Test 10/04/23  0334 10/03/23  1953/23  1208 10/03/23  0343 10/02/23  1435 10/02/23  0305   ALKPHOS 234*  --   --  142*  --  127   BILITOTAL 0.9  --   --  1.1  --  1.8*   ALT 30  --   --  21  --  25   AST 80*  --   --  64*  --  66*   PROTTOTAL 5.3*  --   --  5.0*  --  4.3*   ALBUMIN 2.7*  2.7* 2.5* 2.5* 2.6*  2.6*   < > 2.5*  2.5*    < > = values in this interval not displayed.       Iron Panel - No lab results found.        Current Medications:   acetaminophen  650 mg Oral or Feeding Tube Q6H    B and C vitamin Complex with folic acid  10 mL Per Feeding Tube Daily    gabapentin  100 mg Oral Q8H COLUMBA    heparin ANTICOAGULANT  5,000 Units Subcutaneous Q8H    hydrocortisone sodium succinate PF  50 mg Intravenous Q6H COLUMBA    insulin aspart  1-12 Units Subcutaneous Q4H    melatonin  5 mg Oral or Feeding Tube QPM    pantoprazole  40 mg Per Feeding Tube QAM AC    Or    pantoprazole  40 mg Intravenous QAM AC    [Held by provider] polyethylene glycol  17 g Oral or Feeding Tube Daily    protein modular  1 packet Per Feeding Tube TID    [Held by provider] senna-docusate  1 tablet Oral or Feeding Tube BID    sodium chloride (PF)  3 mL Intracatheter Q8H    thiamine  100 mg Oral or Feeding Tube BID      dexmedeTOMIDine 0.6 mcg/kg/hr (10/04/23 0600)    dextrose      dextrose      CRRT replacement solution 12.5 mL/kg/hr (10/04/23 0602)    - MEDICATION INSTRUCTIONS -      CRRT replacement solution 200 mL/hr at 10/03/23 2152    CRRT replacement solution 12.5 mL/kg/hr (10/04/23 0602)

## 2023-10-04 NOTE — PLAN OF CARE
Goal Outcome Evaluation:      Plan of Care Reviewed With: other (see comments) SICU, RN, family    Overall Patient Progress: no changeOverall Patient Progress: no change    Outcome Evaluation: Please see 10/4 RD note for updated assessment

## 2023-10-04 NOTE — PROGRESS NOTES
CLINICAL NUTRITION SERVICES - REASSESSMENT NOTE     Nutrition Prescription    RECOMMENDATIONS FOR MDs/PROVIDERS TO ORDER:  Caution with liquid tylenol once pt starts stooling again, as this med is hyperosmolar / can cause hyperosmotic diarrhea    Malnutrition Status:    Moderate malnutrition in the context of acute illness    Recommendations already ordered by Registered Dietitian (RD):  Resume EN via new FT, OK to use per SICU:  Pivot 1.5 Juvencio (or equivalent) @ goal of  50ml/hr  (1200ml/day) + 3 pkts prosource TF20 provides: 2040 kcals (28kcal/kg), 172 g PRO (2.4g/kg), 900 ml free H20, 206 g CHO, and 9 g fiber daily.     Future/Additional Recommendations:  - Monitor for ability to initiate PO diet pending mental status, with SLP [place]in.  - Monitor GI status / stool trends with resumption of bowel regimen  - Monitor K+ and phos.  If renal formula indicated: Novasource Renal (or equivalent) @ 35 ml/hr (840 ml) + 4 pkts prosource TF 20 provides 2000 kcal (28 kcal/kg), 156 g pro (2.2 g/kg), 154 g CHO, 602 ml free water, and 0 g fiber daily.        EVALUATION OF THE PROGRESS TOWARD GOALS   Diet: NPO  Nutrition Support:   Parenteral:   - TPN 9/27 to 10/3 (6 day course) d/t concern for rectal ishcemia and multiple OR trips  - Goal PN provides 240 g dextrose, 120 g AA, and lipid kcals from propofol vs daily 250ml 20% clinolipid for total provision of  1796 Kcals (25 Kcals/kg), 1.7 g/kg protein, GIR 2.3 mg/kg/minute, and 28% fat kcals on average daily.     9/27: 180 g dex   9/28: 210 g dex   9/29: 240 g dex (goal)   10/2: Discontinue lipids with EN resumption / adv   10/3: TPN discontinued altogether     Enteral, via larger bore NGT:  9/29 - 10/1: Pivot 1.5 @ 10 ml/hr     10/2 - ___: Pivot 1.5 Juvencio (or equivalent) @ goal of  50ml/hr  (1200ml/day) + 3 pkts prosource TF20 provides: 2040 kcals (28kcal/kg), 172 g PRO (2.4g/kg), 900 ml free H20, 206 g CHO, and 9 g fiber daily.   EN reached goal 10/3 @ 1300 - held later in the day  for extubation.     Intake:   Daily average intake over past 7 days (PN + EN + propofol) provided 1634 kcals (23kcal/kg), 135 g protein (1.9gm/kg) for 91% low end kcal needs, 94 % low end protein needs    NEW FINDINGS   Pt extubated yesterday.  Feeds held d/t resp concerns, preference for small bore FT per SICU - placed by writer, see procedure note.  Mental status precludes safe PO at this time    Weight: 89.1 kg.  Up from admission but trending down from highest weight.  Using dosing weight of 72 kg (driest weight this admit)    Labs: reviewed. K+ 4.6 (WNL), Phos 4. 8 (H) <-- 3.8    Meds: liquid tylenol q 6 hours, nephronex, iv solucortef, high resistance insulin, mirlax, senna, thiamine 100 mg     GI: LBM 9/30 - bowel meds unheld.      Skin: LLE fasciotomy - plan for amputation in near future.  Abd wound vac    Renal: CRRT    Respiratory: extubated.  HFNC --> Nasal canula    MALNUTRITION  % Intake: Decreased intake does not meet criteria  % Weight Loss: None noted, confounded by fluid  Subcutaneous Fat Loss: None observed  Muscle Loss: Temporal:  mild and Thoracic region (clavicle, acromium bone, deltoid, trapezius, pectoral):  mild.  Upper and lower arm: Mild.  Edema may be masking further losses  Fluid Accumulation/Edema: +2-3 edema per flowsheets  Malnutrition Diagnosis: Moderate malnutrition in the context of acute illness    Previous Goals   Total avg nutritional intake to meet a minimum of 25 kcal/kg and 1.5 g PRO/kg daily (per dosing wt 72 kg).   Evaluation: not met for kcals, met for protein, altough protein needs reassessed with CRRT    Previous Nutrition Diagnosis  Altered GI function related to GI bleed and concern for bowel ischemia as evidenced by strict NPO with NGT to LIS and reliant on TPN to meet 100% nutrition needs   Evaluation: No longer applicable, nutrition diagnosis changed below    CURRENT NUTRITION DIAGNOSIS  Inadequate oral intake related to tenuous resp status post extubation and  altered mental status as evidenced by NPO with reliance on EN via new small bore FT to meet 100% nutrition needs    Increased nutrient needs (protein) related to CRRT and wound vac as evidenced by assessed needs of 2-2.5g/kg    INTERVENTIONS  Implementation  Collaboration with other providers  Enteral Nutrition - resume    Goals  Total avg nutritional intake to meet a minimum of 25 kcal/kg and 2 g PRO/kg daily (per dosing wt 72 kg).    Monitoring/Evaluation  Progress toward goals will be monitored and evaluated per protocol.    Natty Zeng, RD, LD, CNSC  4E SICU RD pager: [phone]  Ascom: [phone]  Weekend/Holiday RD pager [phone]

## 2023-10-04 NOTE — PROGRESS NOTES
Vascular Surgery Progress Note  10/04/2023       Subjective:  Extubated to HFNC, experiencing ICU delirium, whispers words, remains on precedex. Now negative 1.2L over 24 hrs and 800ml since midnight. Continues on CVVH.  Slight leukocytosis, WBC at 16 this morning continues to be hemodynamically stable with decreased oxygenation requirements on high flow nasal cannula. Antibiotics were stopped yesterday sputum with Candida, presumed colonization    Objective:  Temp:  [97.8  F (36.6  C)-99.9  F (37.7  C)] 98.4  F (36.9  C)  Pulse:  [] 86  Resp:  [9-27] 21  BP: ()/(62-96) 120/81  MAP:  [61 mmHg-88 mmHg] 82 mmHg  Arterial Line BP: ()/(46-67) 120/63  FiO2 (%):  [50 %-60 %] 50 %  SpO2:  [88 %-100 %] 96 %    I/O last 3 completed shifts:  In: 1973.18 [I.V.:598.18; NG/GT:375]  Out: 3546.9 [Urine:50; Drains:100; Other:3396.9]      Gen: Delirious, whispers words, follows commands in all extremities.  CV: RR per radial pulse, off pressors, sinus tach per tele with PACs  Resp: On high flow nasal cannula, down to 50% FiO2, nonlabored.  Abd: Wound vac in place, holding seal, abdomen soft to palpation around VAC.    Ext: RLE wwp. LLE cool to touch,dusky, continues to have redness on dorsal aspect of foot. Blistering appreciated on proximal calf posteriorly. no DP/PT signals on LLE.     UOP: None since removal of aparicio  Temp abd closure output: 100ml over the last 24 hours.     Labs:  Recent Labs   Lab 10/04/23  0334 10/03/23  1953/23  1208   WBC 16.7* 16.2* 13.9*   HGB 7.6* 7.6* 7.5*   PLT 75* 66* 57*       Recent Labs   Lab 10/04/23  0334 10/04/23  0333 10/03/23  2351 10/03/23  1953/23  1214 10/03/23  1208     138  --   --  138  --  138   POTASSIUM 4.6  4.6  --   --  4.2  --  4.3   CHLORIDE 106  106  --   --  105  --  105   CO2 19*  19*  --   --  18*  --  19*   BUN 63.4*  63.4*  --   --  66.6*  --  61.5*   CR 1.93*  1.93*  --   --  1.94*  --  2.09*   *  122* 112* 110* 201*  234*    < > 252*   OMAR 8.1*  8.1*  --   --  7.7*  --  7.8*   MAG 2.5*  --   --  2.4*  --  2.5*   PHOS 4.8*  --   --  3.8  --  4.3    < > = values in this interval not displayed.      Assessment/Plan:   68 year old male with PMH of HTN and previous TIA who presented with R sided abdominal pain found to have contained ruptured AAA, now s/p open AAA repair 9/24 into 9/25am with 30 min supraceliac clamp time, prolonged inter-renal clamp time, temporary abdominal closure. He remains critically ill in the ICU. He did have bloody stool postop with flex sig 9/25 demonstrating ischemic mucosal changes of the rectum, however on repeated abdominal looks he has had no evidence of transmural necrosis of the small or large intestine, or the rectum. 9/29 he was started on CRRT given ongoing low UOP and rising Cr and failure of lasix challenge. Hemodynamically he continues to do well off pressors with negative I/O's on CVVH. Ongoing absent signals with ischemic LLE, note DVT in this leg, this is to be expected given ischemia and absent inflow. We will continue to monitor LLE given it is not currently making patient systemically ill and there is no indication for urgent intervention. S/p closure of abdominal fascia and subcutaneous tissue left open with wound VAC applied 10/2/23.    - Appreciate excellent ICU care, will defer delirium management to ICU  - Okay off antibiotics, hemodynamically stable no signs or symptoms of sepsis, with improved oxygenation requirements. We will monitor trends of leukocytosis in context of clinical picture  - Goal SBP <160, MAP >65  - Appreciate TF, goal per nutrition team  - Appreciate nephrology recommendations  - Continue CRRT at this time  - Repeat sputum culture from 10/1/23 with candida, likely colonization, no clear indications for antibiotics/antifungals. Flagyl, cefepime, voriconazole stopped 10/3/23. Blood cultures NGTD. CXR with diffuse mixed opacities.  - Ongoing thrombocytopenia, improving,  this morning plt up to 75 from 66 yesterday.  - We will plant to change LLE VAC and abd incision VAC at bedside today  - There is DVT of LLE popliteal vein and we expect there to be  thrombosis from stasis in the tibial and popliteal veins as this is the level the patient is ischemic. No indication to anticoagulate for this.     Seen and Discussed with Dr. Donald who will discuss with vascular surgery staff, Dr. Kandace Carrasco, CNP  Vascular Surgery  Pager: [phone]  [email or web address]  Send message or 10 digit call back number Securely via [employer] with the [employer] Web Console (learn more here)

## 2023-10-04 NOTE — PROGRESS NOTES
SURGICAL ICU PROGRESS NOTE  10/04/2023        Date of Service (when I saw the patient): 10/04/2023    ASSESSMENT:   Alfredo Burnham is a 68 year old male who was admitted to the SICU on 9/24/2023 s/p open AAA repair. Patient has a history of HTN and previous TIA. He presented to the ED on 9/24 with right sided abdominal pain and was found to have a contained, ruptured AAA on CT. 30 min super-celiac clamp time. Prolonged intra-renal clamp. 9/25 s/p flex sig showing rectal ischemia, abdominal washout showing a hemostatic AAA graft and no evidence of transmural colonic or small bowel ischemia, and an unsuccessful LLE embolectomy. 9/26 s/p repeat abdominal washout, retroperitoneal closure, LLE wound washout. 9/29 s/p repeat abd washout, bowel appeared well perfused w/o notable ischemia. OR 10/2 for abdominal fascia closure. Extubated 10/4.    CHANGES and MAJOR THINGS TODAY:   - Add bedtime seroquel for sleep aid  - Add hydroxyzine PRN for restlessness   - Add robaxin 500 TID scheduled   - Increase tylenol to 975 q6  - Restart tube feeds; advance NG to NJ   - Restart bowel regimen   - Dialysis management per nephrology  - RAPS consult for pain block pre-op on LLE    PLAN:    Neurological:  # Acute pain   # Mixed metabolic encephalopathy   - Monitor neurological status. Delirium preventions and precautions  - CAM-ICU score=3 (delirium present)  - Melatonin q6pm for sleep aid  - Seroquel at bedtime for sleep aid   - Sedation plan: Precedex gtt (@ 0.6)  - RASS goal 0 to -1  - Pain:   - PRN oxycodone 10 mg q4 PRN   - PRN dilaudid  - Increase tylenol to 975 q6   - Add robaxin  - Scheduled bedtime Seroquel and hydroxyzine for sleep aid and restlessness  - RAPS consult for block pre-op for amputation on LLE    Pulmonary:  # Acute hypoxic respiratory failure  # s/p extubation 10/3  - HFNC at 50% FiO2 and 30 LPM; continue to wean down as able   - Satted well overnight (%)  - Head of bed elevation     Cardiovascular:     # Contained AAA rupture s/p open repair  # Suspected emboli to LLE  #Hx of HTN  #Sinus tachycardia  #Ischemic LLE  CVPs 11-12 over last 24 hrs; Bps 109//61; MAPs 73-80. Ischemic LLL- no pulse signals. Intra op evaluation 10/2 with partially necrotic muscle.   - Continue to monitor hemodynamic status.   - Goal MAP >65, SBP <160      GI/Nutrition:    # Protein calorie deficit malnutrition  #s/p open AAA repair, abthera device in place  # Post-operative melena  # Rectal ischemia, concern for (resolved)  - PPI for ppx  - Abthera device in place; mesh closure of abdomen   - Discontinue TPN  - Advance NG to NJ   - Restart tube feeds   - Restart bowel regimen    Renal/ Fluids/Electrolytes:  # MAYA  - Access: Right femoral temporary dialysis catheter  - Remains total volume up   - Daily bladder straight cath  - Dialysis management per nephrology    Endocrine:  # Stress hyperglycemia  - Q6 BG checks, switching to high dose sliding scale insulin.     Infectious disease:   Afebrile. Rising white count to 16.7, likely a product of hemoconcentration.   9/28 BAL with 3+ candida albicans, +1 aspergillus fumigatus  Previously treated with empiric cefepime and metronidazole and voriconazole for positive BAL  - Continue without antibiotics    Hematology:    # Acute blood loss anemia  # Thrombocytopenia, multifactorial  # LLE ischemia/ DVT  Admission platelet count 94K. Platelets at 75. 4T score: 3 points (0/1/2/0). Low probability for HIT. Thrombocytopenia likely multifactorial given acute blood loss, critical illness. Hemoglobin stable at 7.7  - Continue subcutaneous heparin  - Reduce hydrocortisone frequency to 50 mg BID    Musculoskeletal:  # Weakness and deconditioning of critical illness  # Left lower limb ischemia   - Physical and occupational therapy consult when appropriate. Passive ROM at bedside with RN  - Likely will return to OR with vascular 10/9 for LLE amputation    General Cares/Prophylaxis:    DVT Prophylaxis:  Heparin SQ and Pneumatic Compression Devices  GI Prophylaxis: PPI  Restraints: Restraints for medical healing needed: YES    Lines/ tubes/ drains:  - ETT  - Lu- dc  - Abthera  - PIV x3  - RIJ MAC  - R radial arterial line  - R FV dialysis catheter     Disposition:  - Surgical ICU     Patient seen, findings and plan discussed with surgical ICU staff, Dr. Narvaez.    Jenni Mora, MS4  Surgical ICU    ====================================  INTERVAL HISTORY:   No acute events overnight. Remained extubated and on HFNC. Was able to wean down on FiO2 and flow requirements. Was delirious and restless overnight.     ROS unable to be performed due to sedation delirium.      OBJECTIVE:   1. VITAL SIGNS:   Temp:  [97.8  F (36.6  C)-99.9  F (37.7  C)] 98.4  F (36.9  C)  Pulse:  [] 78  Resp:  [9-27] 12  BP: ()/(62-96) 120/81  MAP:  [61 mmHg-88 mmHg] 74 mmHg  Arterial Line BP: ()/(46-67) 109/55  FiO2 (%):  [40 %-60 %] 50 %  SpO2:  [88 %-100 %] 97 %  Vent Mode: CPAP/PS  (Continuous positive airway pressure with Pressure Support)  FiO2 (%): 50 %  PEEP (cm H2O): 8 cmH2O  Pressure Support (cm H2O): 6 cmH2O  Resp: 12      2. INTAKE/ OUTPUT:   I/O last 3 completed shifts:  In: 2721.18 [I.V.:791.18; NG/GT:340]  Out: 3989.9 [Urine:50; Drains:100; Other:3839.9]    3. PHYSICAL EXAMINATION:  General: Intubated, lightly sedated. Can squeeze fingers on command and answer simple questions.   HEENT: Normocephalic, atraumatic.   Neuro: Withdraws upper extremities to noxious stimulus. Cognition intact   Pulm/Resp: Clear lung fields  CV: Regular rhythm, occasionally mildly tachycardic   Abdomen: Abdomen fascia closed with Abthera in place, serosanguinous output, soft, mildly distended, soft  :  CRRT. Significant scrotal and penile swelling.   MSK/Extremities: RLE warm to palpation with palpable pulse, edematous. LLE cool to touch, dusky, no signals distal of popliteal.     4. INVESTIGATIONS:   Arterial Blood Gases    Recent Labs   Lab 10/03/23  2043 10/01/23  0348 09/30/23  1547 09/30/23  0404   PH 7.44 7.42 7.42 7.35   PCO2 31* 34* 29* 35   PO2 61* 110* 198* 72*   HCO3 21 22 19* 19*     Complete Blood Count   Recent Labs   Lab 10/04/23  0334 10/03/23  1953/23  1208 10/03/23  0343   WBC 16.7* 16.2* 13.9* 12.3*   HGB 7.6* 7.6* 7.5* 7.7*   PLT 75* 66* 57* 51*     Basic Metabolic Panel  Recent Labs   Lab 10/04/23  0334 10/04/23  0333 10/03/23  2351 10/03/23  1953/23  1214 10/03/23  1208 10/03/23  0815 10/03/23  0343     138  --   --  138  --  138  --  138  138   POTASSIUM 4.6  4.6  --   --  4.2  --  4.3  --  4.6  4.6   CHLORIDE 106  106  --   --  105  --  105  --  106  106   CO2 19*  19*  --   --  18*  --  19*  --  18*  18*   BUN 63.4*  63.4*  --   --  66.6*  --  61.5*  --  58.8*  58.8*   CR 1.93*  1.93*  --   --  1.94*  --  2.09*  --  2.26*  2.26*   *  122* 112* 110* 201*  234*   < > 252*   < > 214*  214*    < > = values in this interval not displayed.     Liver Function Tests  Recent Labs   Lab 10/04/23  0334 10/03/23  1953/23  1208 10/03/23  0343 10/02/23  1435 10/02/23  0305 10/01/23  1155 10/01/23  0344 09/30/23  1931 09/30/23  1003 09/30/23  0403   AST 80*  --   --  64*  --  66*  --  88*  --  125* 133*   ALT 30  --   --  21  --  25  --  26  --  30 37   ALKPHOS 234*  --   --  142*  --  127  --  103  --  75 75   BILITOTAL 0.9  --   --  1.1  --  1.8*  --  1.6*  --  1.4* 1.4*   ALBUMIN 2.7*  2.7* 2.5* 2.5* 2.6*  2.6*   < > 2.5*  2.5*   < > 2.0*  2.0*   < > 2.1* 2.2*   INR  --   --   --   --   --  1.30*  --  1.28*  --  1.36* 1.35*    < > = values in this interval not displayed.     Pancreatic Enzymes  No lab results found in last 7 days.  Coagulation Profile  Recent Labs   Lab 10/02/23  0305 10/01/23  0344 09/30/23  1003 09/30/23  0403 09/30/23  0048 09/29/23  1938   INR 1.30* 1.28* 1.36* 1.35* 1.31* 1.37*   PTT  --   --  39* 37 38 37         5. RADIOLOGY:   No results found for  this or any previous visit (from the past 24 hour(s)).    =========================================    I saw and evaluated the patient in an independent visit and agree with the student's assessment and plan as written above. I was present at all times for any mentioned procedures.     Clint Braga MD  PGY-1, Neurosurgery  Off-service on SICU

## 2023-10-05 ENCOUNTER — APPOINTMENT (OUTPATIENT)
Dept: GENERAL RADIOLOGY | Facility: CLINIC | Age: 68
DRG: 268 | End: 2023-10-05
Payer: COMMERCIAL

## 2023-10-05 LAB
ALBUMIN SERPL BCG-MCNC: 2.6 G/DL (ref 3.5–5.2)
ALP SERPL-CCNC: 252 U/L (ref 40–129)
ALT SERPL W P-5'-P-CCNC: 31 U/L (ref 0–70)
ANION GAP SERPL CALCULATED.3IONS-SCNC: 16 MMOL/L (ref 7–15)
ANION GAP SERPL CALCULATED.3IONS-SCNC: 17 MMOL/L (ref 7–15)
AST SERPL W P-5'-P-CCNC: 54 U/L (ref 0–45)
BILIRUB SERPL-MCNC: 0.6 MG/DL
BLD PROD TYP BPU: NORMAL
BLOOD COMPONENT TYPE: NORMAL
BUN SERPL-MCNC: 107 MG/DL (ref 8–23)
BUN SERPL-MCNC: 120 MG/DL (ref 8–23)
BUN SERPL-MCNC: 91.5 MG/DL (ref 8–23)
BUN SERPL-MCNC: 91.5 MG/DL (ref 8–23)
CA-I BLD-MCNC: 4.1 MG/DL (ref 4.4–5.2)
CA-I BLD-MCNC: 4.2 MG/DL (ref 4.4–5.2)
CA-I BLD-MCNC: 4.2 MG/DL (ref 4.4–5.2)
CALCIUM SERPL-MCNC: 7.5 MG/DL (ref 8.8–10.2)
CALCIUM SERPL-MCNC: 7.5 MG/DL (ref 8.8–10.2)
CALCIUM SERPL-MCNC: 7.6 MG/DL (ref 8.8–10.2)
CALCIUM SERPL-MCNC: 7.8 MG/DL (ref 8.8–10.2)
CHLORIDE SERPL-SCNC: 105 MMOL/L (ref 98–107)
CHLORIDE SERPL-SCNC: 105 MMOL/L (ref 98–107)
CHLORIDE SERPL-SCNC: 106 MMOL/L (ref 98–107)
CHLORIDE SERPL-SCNC: 106 MMOL/L (ref 98–107)
CODING SYSTEM: NORMAL
CREAT SERPL-MCNC: 2.84 MG/DL (ref 0.67–1.17)
CREAT SERPL-MCNC: 2.84 MG/DL (ref 0.67–1.17)
CREAT SERPL-MCNC: 3.33 MG/DL (ref 0.67–1.17)
CREAT SERPL-MCNC: 3.8 MG/DL (ref 0.67–1.17)
CROSSMATCH: NORMAL
DEPRECATED HCO3 PLAS-SCNC: 17 MMOL/L (ref 22–29)
DEPRECATED HCO3 PLAS-SCNC: 17 MMOL/L (ref 22–29)
DEPRECATED HCO3 PLAS-SCNC: 18 MMOL/L (ref 22–29)
DEPRECATED HCO3 PLAS-SCNC: 18 MMOL/L (ref 22–29)
EGFRCR SERPLBLD CKD-EPI 2021: 16 ML/MIN/1.73M2
EGFRCR SERPLBLD CKD-EPI 2021: 19 ML/MIN/1.73M2
EGFRCR SERPLBLD CKD-EPI 2021: 23 ML/MIN/1.73M2
EGFRCR SERPLBLD CKD-EPI 2021: 23 ML/MIN/1.73M2
ERYTHROCYTE [DISTWIDTH] IN BLOOD BY AUTOMATED COUNT: 18.7 % (ref 10–15)
ERYTHROCYTE [DISTWIDTH] IN BLOOD BY AUTOMATED COUNT: 18.9 % (ref 10–15)
GLUCOSE BLDC GLUCOMTR-MCNC: 140 MG/DL (ref 70–99)
GLUCOSE BLDC GLUCOMTR-MCNC: 157 MG/DL (ref 70–99)
GLUCOSE BLDC GLUCOMTR-MCNC: 165 MG/DL (ref 70–99)
GLUCOSE BLDC GLUCOMTR-MCNC: 166 MG/DL (ref 70–99)
GLUCOSE BLDC GLUCOMTR-MCNC: 179 MG/DL (ref 70–99)
GLUCOSE SERPL-MCNC: 165 MG/DL (ref 70–99)
GLUCOSE SERPL-MCNC: 171 MG/DL (ref 70–99)
GLUCOSE SERPL-MCNC: 184 MG/DL (ref 70–99)
GLUCOSE SERPL-MCNC: 184 MG/DL (ref 70–99)
HCT VFR BLD AUTO: 21.5 % (ref 40–53)
HCT VFR BLD AUTO: 24.2 % (ref 40–53)
HGB BLD-MCNC: 6.9 G/DL (ref 13.3–17.7)
HGB BLD-MCNC: 7.9 G/DL (ref 13.3–17.7)
ISSUE DATE AND TIME: NORMAL
LACTATE SERPL-SCNC: 1.5 MMOL/L (ref 0.7–2)
LACTATE SERPL-SCNC: 1.6 MMOL/L (ref 0.7–2)
LACTATE SERPL-SCNC: 1.7 MMOL/L (ref 0.7–2)
MAGNESIUM SERPL-MCNC: 2.7 MG/DL (ref 1.7–2.3)
MCH RBC QN AUTO: 32.2 PG (ref 26.5–33)
MCH RBC QN AUTO: 32.5 PG (ref 26.5–33)
MCHC RBC AUTO-ENTMCNC: 32.1 G/DL (ref 31.5–36.5)
MCHC RBC AUTO-ENTMCNC: 32.6 G/DL (ref 31.5–36.5)
MCV RBC AUTO: 101 FL (ref 78–100)
MCV RBC AUTO: 99 FL (ref 78–100)
PHOSPHATE SERPL-MCNC: 6.8 MG/DL (ref 2.5–4.5)
PHOSPHATE SERPL-MCNC: 6.9 MG/DL (ref 2.5–4.5)
PHOSPHATE SERPL-MCNC: 6.9 MG/DL (ref 2.5–4.5)
PLATELET # BLD AUTO: 94 10E3/UL (ref 150–450)
PLATELET # BLD AUTO: 95 10E3/UL (ref 150–450)
POTASSIUM SERPL-SCNC: 4.9 MMOL/L (ref 3.4–5.3)
POTASSIUM SERPL-SCNC: 4.9 MMOL/L (ref 3.4–5.3)
POTASSIUM SERPL-SCNC: 5 MMOL/L (ref 3.4–5.3)
POTASSIUM SERPL-SCNC: 5.1 MMOL/L (ref 3.4–5.3)
PROT SERPL-MCNC: 5 G/DL (ref 6.4–8.3)
RBC # BLD AUTO: 2.12 10E6/UL (ref 4.4–5.9)
RBC # BLD AUTO: 2.45 10E6/UL (ref 4.4–5.9)
SODIUM SERPL-SCNC: 139 MMOL/L (ref 135–145)
SODIUM SERPL-SCNC: 140 MMOL/L (ref 135–145)
UNIT ABO/RH: NORMAL
UNIT NUMBER: NORMAL
UNIT STATUS: NORMAL
UNIT TYPE ISBT: 5100
WBC # BLD AUTO: 12.2 10E3/UL (ref 4–11)
WBC # BLD AUTO: 13.5 10E3/UL (ref 4–11)

## 2023-10-05 PROCEDURE — 250N000013 HC RX MED GY IP 250 OP 250 PS 637: Performed by: ANESTHESIOLOGY

## 2023-10-05 PROCEDURE — 82330 ASSAY OF CALCIUM: CPT | Performed by: NURSE PRACTITIONER

## 2023-10-05 PROCEDURE — 83735 ASSAY OF MAGNESIUM: CPT | Performed by: NURSE PRACTITIONER

## 2023-10-05 PROCEDURE — 250N000011 HC RX IP 250 OP 636: Performed by: PHYSICIAN ASSISTANT

## 2023-10-05 PROCEDURE — 80053 COMPREHEN METABOLIC PANEL: CPT | Performed by: NURSE PRACTITIONER

## 2023-10-05 PROCEDURE — 250N000011 HC RX IP 250 OP 636: Mod: JZ

## 2023-10-05 PROCEDURE — 250N000013 HC RX MED GY IP 250 OP 250 PS 637: Performed by: STUDENT IN AN ORGANIZED HEALTH CARE EDUCATION/TRAINING PROGRAM

## 2023-10-05 PROCEDURE — 99291 CRITICAL CARE FIRST HOUR: CPT | Mod: 25 | Performed by: ANESTHESIOLOGY

## 2023-10-05 PROCEDURE — 99024 POSTOP FOLLOW-UP VISIT: CPT | Performed by: NURSE PRACTITIONER

## 2023-10-05 PROCEDURE — 36556 INSERT NON-TUNNEL CV CATH: CPT | Mod: GC | Performed by: ANESTHESIOLOGY

## 2023-10-05 PROCEDURE — 85027 COMPLETE CBC AUTOMATED: CPT | Performed by: TRANSPLANT SURGERY

## 2023-10-05 PROCEDURE — 83605 ASSAY OF LACTIC ACID: CPT | Performed by: NURSE PRACTITIONER

## 2023-10-05 PROCEDURE — 80048 BASIC METABOLIC PNL TOTAL CA: CPT | Performed by: NURSE PRACTITIONER

## 2023-10-05 PROCEDURE — 250N000009 HC RX 250: Performed by: NURSE PRACTITIONER

## 2023-10-05 PROCEDURE — 999N000065 XR CHEST PORT 1 VIEW

## 2023-10-05 PROCEDURE — 250N000013 HC RX MED GY IP 250 OP 250 PS 637

## 2023-10-05 PROCEDURE — 250N000013 HC RX MED GY IP 250 OP 250 PS 637: Performed by: SURGERY

## 2023-10-05 PROCEDURE — 250N000009 HC RX 250: Performed by: PHYSICIAN ASSISTANT

## 2023-10-05 PROCEDURE — 82040 ASSAY OF SERUM ALBUMIN: CPT | Performed by: NURSE PRACTITIONER

## 2023-10-05 PROCEDURE — 250N000011 HC RX IP 250 OP 636: Mod: JZ | Performed by: PHARMACIST

## 2023-10-05 PROCEDURE — 84100 ASSAY OF PHOSPHORUS: CPT | Performed by: NURSE PRACTITIONER

## 2023-10-05 PROCEDURE — 85027 COMPLETE CBC AUTOMATED: CPT

## 2023-10-05 PROCEDURE — 200N000002 HC R&B ICU UMMC

## 2023-10-05 PROCEDURE — 250N000011 HC RX IP 250 OP 636: Performed by: NURSE PRACTITIONER

## 2023-10-05 PROCEDURE — 71045 X-RAY EXAM CHEST 1 VIEW: CPT | Mod: 26 | Performed by: RADIOLOGY

## 2023-10-05 PROCEDURE — P9016 RBC LEUKOCYTES REDUCED: HCPCS | Performed by: PHYSICIAN ASSISTANT

## 2023-10-05 RX ORDER — CALCIUM GLUCONATE 20 MG/ML
2 INJECTION, SOLUTION INTRAVENOUS ONCE
Status: COMPLETED | OUTPATIENT
Start: 2023-10-05 | End: 2023-10-05

## 2023-10-05 RX ORDER — FENTANYL CITRATE 50 UG/ML
100 INJECTION, SOLUTION INTRAMUSCULAR; INTRAVENOUS ONCE
Status: COMPLETED | OUTPATIENT
Start: 2023-10-05 | End: 2023-10-05

## 2023-10-05 RX ORDER — ACETAMINOPHEN 325 MG/1
975 TABLET ORAL EVERY 6 HOURS
Status: DISCONTINUED | OUTPATIENT
Start: 2023-10-05 | End: 2023-11-14

## 2023-10-05 RX ORDER — LIDOCAINE HYDROCHLORIDE 20 MG/ML
JELLY TOPICAL EVERY 4 HOURS PRN
Status: DISCONTINUED | OUTPATIENT
Start: 2023-10-05 | End: 2023-11-07

## 2023-10-05 RX ORDER — FUROSEMIDE 10 MG/ML
120 INJECTION INTRAMUSCULAR; INTRAVENOUS ONCE
Status: COMPLETED | OUTPATIENT
Start: 2023-10-05 | End: 2023-10-05

## 2023-10-05 RX ADMIN — THIAMINE HCL TAB 100 MG 100 MG: 100 TAB at 20:40

## 2023-10-05 RX ADMIN — HYDROCORTISONE SODIUM SUCCINATE 50 MG: 100 INJECTION, POWDER, FOR SOLUTION INTRAMUSCULAR; INTRAVENOUS at 09:41

## 2023-10-05 RX ADMIN — GABAPENTIN 100 MG: 250 SUSPENSION ORAL at 05:50

## 2023-10-05 RX ADMIN — INSULIN ASPART 2 UNITS: 100 INJECTION, SOLUTION INTRAVENOUS; SUBCUTANEOUS at 03:58

## 2023-10-05 RX ADMIN — HYDROXYZINE HYDROCHLORIDE 50 MG: 25 TABLET, FILM COATED ORAL at 01:45

## 2023-10-05 RX ADMIN — INSULIN ASPART 2 UNITS: 100 INJECTION, SOLUTION INTRAVENOUS; SUBCUTANEOUS at 12:49

## 2023-10-05 RX ADMIN — LIDOCAINE HYDROCHLORIDE: 20 JELLY TOPICAL at 10:50

## 2023-10-05 RX ADMIN — LABETALOL HYDROCHLORIDE 10 MG: 5 INJECTION, SOLUTION INTRAVENOUS at 14:38

## 2023-10-05 RX ADMIN — Medication 10 ML: at 08:27

## 2023-10-05 RX ADMIN — THIAMINE HCL TAB 100 MG 100 MG: 100 TAB at 08:27

## 2023-10-05 RX ADMIN — Medication 40 MG: at 08:27

## 2023-10-05 RX ADMIN — INSULIN ASPART 1 UNITS: 100 INJECTION, SOLUTION INTRAVENOUS; SUBCUTANEOUS at 08:28

## 2023-10-05 RX ADMIN — ACETAMINOPHEN 975 MG: 325 SOLUTION ORAL at 08:27

## 2023-10-05 RX ADMIN — CALCIUM GLUCONATE 2 G: 20 INJECTION, SOLUTION INTRAVENOUS at 20:39

## 2023-10-05 RX ADMIN — CALCIUM GLUCONATE 2 G: 20 INJECTION, SOLUTION INTRAVENOUS at 14:01

## 2023-10-05 RX ADMIN — FENTANYL CITRATE 100 MCG: 50 INJECTION, SOLUTION INTRAMUSCULAR; INTRAVENOUS at 11:15

## 2023-10-05 RX ADMIN — METHOCARBAMOL 500 MG: 500 TABLET, FILM COATED ORAL at 09:41

## 2023-10-05 RX ADMIN — GABAPENTIN 100 MG: 250 SUSPENSION ORAL at 14:02

## 2023-10-05 RX ADMIN — HEPARIN SODIUM 5000 UNITS: 5000 INJECTION, SOLUTION INTRAVENOUS; SUBCUTANEOUS at 08:30

## 2023-10-05 RX ADMIN — ACETAMINOPHEN 975 MG: 325 SOLUTION ORAL at 01:45

## 2023-10-05 RX ADMIN — DEXMEDETOMIDINE HYDROCHLORIDE 0.6 MCG/KG/HR: 400 INJECTION INTRAVENOUS at 12:07

## 2023-10-05 RX ADMIN — FUROSEMIDE 120 MG: 10 INJECTION, SOLUTION INTRAVENOUS at 11:15

## 2023-10-05 RX ADMIN — HEPARIN SODIUM 5000 UNITS: 5000 INJECTION, SOLUTION INTRAVENOUS; SUBCUTANEOUS at 15:38

## 2023-10-05 RX ADMIN — INSULIN ASPART 1 UNITS: 100 INJECTION, SOLUTION INTRAVENOUS; SUBCUTANEOUS at 20:40

## 2023-10-05 RX ADMIN — GABAPENTIN 100 MG: 250 SUSPENSION ORAL at 22:20

## 2023-10-05 RX ADMIN — QUETIAPINE FUMARATE 25 MG: 25 TABLET ORAL at 22:20

## 2023-10-05 RX ADMIN — ACETAMINOPHEN 975 MG: 325 TABLET, FILM COATED ORAL at 22:20

## 2023-10-05 RX ADMIN — Medication 5 MG: at 18:07

## 2023-10-05 RX ADMIN — INSULIN ASPART 2 UNITS: 100 INJECTION, SOLUTION INTRAVENOUS; SUBCUTANEOUS at 15:37

## 2023-10-05 RX ADMIN — ACETAMINOPHEN 975 MG: 325 TABLET, FILM COATED ORAL at 15:37

## 2023-10-05 ASSESSMENT — ACTIVITIES OF DAILY LIVING (ADL)
ADLS_ACUITY_SCORE: 47
ADLS_ACUITY_SCORE: 43
ADLS_ACUITY_SCORE: 47
ADLS_ACUITY_SCORE: 43
ADLS_ACUITY_SCORE: 47
ADLS_ACUITY_SCORE: 43

## 2023-10-05 NOTE — CONSULTS
BRIEF SOCIAL WORK NOTE      SW spoke to patient's daughter about his FMLA paperwork. SW spoke with SICU and passed along the paperwork to be filled out and signed.     JR Valerio  ICU   Phone: [phone]  Pager: [phone]

## 2023-10-05 NOTE — CONSULTS
Pain team quick note:    Providers notified about patient and likely upcoming surgery. Evaluated patient 10/4 and consented family member (mahesh - wife) for potential nerve blocks that could be offered in preparation for the surgery.     Please notify team when a definitive surgery is scheduled for this patient and the pain provider at that time will make a decision of what to do based on the clinical scenario. Will keep patient on our list and follow peripherally until that time.       Mindy Hazel MD on 10/5/2023 at 11:01 AM

## 2023-10-05 NOTE — PROGRESS NOTES
SURGICAL ICU PROGRESS NOTE  10/05/2023        Date of Service (when I saw the patient): 10/05/2023    ASSESSMENT:   Alfredo Burnham is a 68 year old male who was admitted to the SICU on 9/24/2023 s/p open AAA repair. Patient has a history of HTN and previous TIA. He presented to the ED on 9/24 with right sided abdominal pain and was found to have a contained, ruptured AAA on CT. 30 min super-celiac clamp time. Prolonged intra-renal clamp. 9/25 s/p flex sig showing rectal ischemia, abdominal washout showing a hemostatic AAA graft and no evidence of transmural colonic or small bowel ischemia, and an unsuccessful LLE embolectomy. 9/26 s/p repeat abdominal washout, retroperitoneal closure, LLE wound washout. 9/29 s/p repeat abd washout, bowel appeared well perfused w/o notable ischemia. OR 10/2 for abdominal fascia closure. Extubated 10/4.     CHANGES and MAJOR THINGS TODAY:   - Ice chips by mouth ok  - Continue to wean off supplemental O2  - R femoral line removal today  - Pain consult for epidural placement the day prior to left lower leg amputation  - Replace aparicio   - Lasix trial   - PT consult  - Placing L internal jugular triple lumen catheter  - Podiatry consult for toenail clipping     PLAN:    Neurological:  # Acute pain   - Continue current pain regimen (oxycodone PRN, dilaudid PRN, scheduled tylenol, robaxin PRN)   - RAPS consult for block pre-op for amputation on LLE    # Delirium, CAM-ICU score=3 (delirium present)  # Mixed metabolic encephalopathy   - Monitor neurological status. Delirium preventions and precautions  - Melatonin q6pm and seroquel for sleep aid  - Atarax PRN for anxiety     # Sedation, RASS goal 0 to -1  - Precedex gtt    Pulmonary:  # Acute hypoxic respiratory failure  # s/p extubation 10/3  - Nasal cannula @ 3-4 LPM; continue to wean down as able   - Satted well overnight (94-96%)  - Head of bed elevation     Cardiovascular:    # Contained AAA rupture s/p open repair  # Suspected  emboli to LLE  # Hx of HTN  # Sinus tachycardia  # Ischemic LLE  CVPs 10 over last 24 hrs; Bps (art line) 95//58; MAPs 67-75. Intra op evaluation 10/2 with partially necrotic muscle.   - Continue to monitor hemodynamic status.   - Goal MAP >65, SBP <160    GI/Nutrition:    # s/p open AAA repair, abthera device in place  # Post-operative melena, resolved  # Rectal ischemia, concern for (resolved)  - PPI for ppx  - Abthera device in place; mesh closure of abdomen   - NJ feeds at goal  - Continue bowel regimen  - Ok for ice chips by mouth    Renal/ Fluids/Electrolytes:  # MAYA  - Low urine output (10 mL on 10/4)  - Remains total volume up (23 L)  - Dialysis management per nephrology   - Will remove R femoral dialysis cath today and evaluate renal function  - Lasix trial today   - Lu back in today   - Placing L internal jugular triple lumen catheter    Endocrine:  # Stress hyperglycemia  - Q6 BG checks, continue high dose sliding scale insulin.     Infectious disease:   Afebrile. White count stable at 14.4.  S/p cefepime and metronidazole and voriconazole for positive BAL (aspergillus and candida)  - No indication for antibiotics    Hematology:    # Acute blood loss anemia  # Thrombocytopenia, multifactorial  # LLE ischemia/ DVT  Admission platelet count 94K. Platelets at 75. 4T score: 3 points (0/1/2/0). Low probability for HIT. Thrombocytopenia likely multifactorial given acute blood loss, critical illness. Hemoglobin stable at 7.7  - Continue subcutaneous heparin  - Hydrocortisone taper at 50 mg daily; will be complete today (10/2-10/5)    Musculoskeletal:  # Weakness and deconditioning of critical illness  # Left lower limb ischemia   - Passive ROM at bedside with RN  - Likely will return to OR with vascular 10/10 for LLE amputation; will have nerve block prior to surgery  - Physical therapy consult   - Podiatry consult for nail clipping     General Cares/Prophylaxis:    DVT Prophylaxis: Heparin SQ and  Pneumatic Compression Devices  GI Prophylaxis: PPI  Restraints: Restraints for medical healing needed: YES    Lines/ tubes/ drains:  - Abthera  - PIV x3  - RIJ MAC  - R radial arterial line  - R FV dialysis catheter (removing today)    Disposition:  - Surgical ICU     Patient seen, findings and plan discussed with surgical ICU staff, Dr. Narvaez.      Jenni Mora, MS4  Surgical ICU    ====================================  INTERVAL HISTORY:   No acute events overnight. No pressors needed overnight. Multiple loose bowel movements overnight.     ROS unable to be performed due to sedation and delirium.    OBJECTIVE:   1. VITAL SIGNS:   Temp:  [98.6  F (37  C)-99.9  F (37.7  C)] 99.1  F (37.3  C)  Pulse:  [] 95  Resp:  [7-25] 16  BP: ()/(51-79) 103/69  MAP:  [56 mmHg-89 mmHg] 75 mmHg  Arterial Line BP: ()/(44-67) 114/59  FiO2 (%):  [50 %] 50 %  SpO2:  [89 %-100 %] 96 %  FiO2 (%): 50 %  Resp: 16      2. INTAKE/ OUTPUT:   I/O last 3 completed shifts:  In: 1430.04 [I.V.:475.04; NG/GT:305]  Out: 2163.6 [Urine:10; Other:2153.6]    3. PHYSICAL EXAMINATION:  General: Nasal cannula, lightly sedated. Can squeeze fingers on command and answer simple yes/no questions. Speech difficult to understand.  HEENT: Normocephalic, atraumatic.   Neuro: Withdraws upper extremities to noxious stimulus. Cognition intact   Pulm/Resp: Clear lung fields  CV: Regular rhythm, occasionally mildly tachycardic   Abdomen: Abdomen fascia closed with Abthera in place, serosanguinous output, soft, mildly distended, soft  :  CRRT. Significant scrotal and penile swelling.   MSK/Extremities: RLE warm to palpation with palpable pulse, edematous. LLE cool to touch, dusky, no signals distal of popliteal.     4. INVESTIGATIONS:   Arterial Blood Gases   Recent Labs   Lab 10/03/23  2043 10/01/23  0348 09/30/23  1547 09/30/23  0404   PH 7.44 7.42 7.42 7.35   PCO2 31* 34* 29* 35   PO2 61* 110* 198* 72*   HCO3 21 22 19* 19*     Complete Blood  Count   Recent Labs   Lab 10/04/23  1241 10/04/23  0334 10/03/23  1953/23  1208   WBC 14.4* 16.7* 16.2* 13.9*   HGB 7.6* 7.6* 7.6* 7.5*   PLT 75* 75* 66* 57*     Basic Metabolic Panel  Recent Labs   Lab 10/05/23  0356 10/05/23  0349 10/04/23  2338 10/04/23  1937 10/04/23  1244 10/04/23  1241 10/04/23  0822 10/04/23  0334 10/03/23  2351 10/03/23  1953   NA  --  139  139  --   --   --  138  --  138  138  --  138   POTASSIUM  --  4.9  4.9  --   --   --  4.8  --  4.6  4.6  --  4.2   CHLORIDE  --  105  105  --   --   --  105  --  106  106  --  105   CO2  --  18*  18*  --   --   --  21*  --  19*  19*  --  18*   BUN  --  91.5*  91.5*  --   --   --  55.2*  --  63.4*  63.4*  --  66.6*   CR  --  2.84*  2.84*  --   --   --  1.83*  --  1.93*  1.93*  --  1.94*   * 184*  184* 167* 132*   < > 114*   < > 122*  122*   < > 201*  234*    < > = values in this interval not displayed.     Liver Function Tests  Recent Labs   Lab 10/05/23  0349 10/04/23  1241 10/04/23  0334 10/03/23  1953/23  1208 10/03/23  0343 10/02/23  1435 10/02/23  0305 10/01/23  1155 10/01/23  0344 09/30/23  1931 09/30/23  1003 09/30/23  0403   AST 54*  --  80*  --   --  64*  --  66*  --  88*  --  125* 133*   ALT 31  --  30  --   --  21  --  25  --  26  --  30 37   ALKPHOS 252*  --  234*  --   --  142*  --  127  --  103  --  75 75   BILITOTAL 0.6  --  0.9  --   --  1.1  --  1.8*  --  1.6*  --  1.4* 1.4*   ALBUMIN 2.6* 2.7* 2.7*  2.7* 2.5*   < > 2.6*  2.6*   < > 2.5*  2.5*   < > 2.0*  2.0*   < > 2.1* 2.2*   INR  --   --   --   --   --   --   --  1.30*  --  1.28*  --  1.36* 1.35*    < > = values in this interval not displayed.     Pancreatic Enzymes  No lab results found in last 7 days.  Coagulation Profile  Recent Labs   Lab 10/02/23  0305 10/01/23  0344 09/30/23  1003 09/30/23  0403 09/30/23  0048 09/29/23  1938   INR 1.30* 1.28* 1.36* 1.35* 1.31* 1.37*   PTT  --   --  39* 37 38 37         5. RADIOLOGY:   Recent Results (from the  past 24 hour(s))   XR Abdomen Port 1 View    Narrative    EXAMINATION:  XR ABDOMEN PORT 1 VIEW 10/4/2023 10:59 AM     COMPARISON: none..    HISTORY: Verify small bowel feeding tube bedside placement.    TECHNIQUE: Frontal view of the abdomen.    FINDINGS: Feeding tube terminates in the distal duodenum. No  abnormally dilated loops of bowel. No pneumatosis or portal venous  gas.       Impression    IMPRESSION: Feeding tube terminates in the distal duodenum.     ABIOLA ANPOLES MD         SYSTEM ID:  [id number]       =========================================      I saw and evaluated the patient in an independent visit and agree with the student's assessment and plan as written above. I was present at all times for any mentioned procedures.     Clint Braga MD  PGY-1, Neurosurgery  Off-service on SICU

## 2023-10-05 NOTE — PROGRESS NOTES
CENTRAL LINE INSERTION PROCEDURE NOTE  (NON-OR)    Procedure Date: 10/5/2023   Performing Physician: Moses Jackson MD  ATTENDING: Nestor Narvaez MD    Procedure:  Insertion of Left Subclvian Central Venous Catheter           Indications:  CVP MONITORING, FLUID RESUSITATION     Estimated Blood Loss:  5ml    Complications: None     Procedure was done as an emergency: No    Sedation: Fentanyl     Procedure Details:     The risks and potential complications related to purposed procedure include but are not limited to infection, bleeding, pain, lung puncture, the need for additional procedures, creating a complication requiring transfusion or operation, and nerve and vessel injury were discussed with the patient wife who concurred with the proposed plan, giving informed consent.      The site of the procedure was properly noted/marked. The patient was identified as Alfredo Burnham with Date of Birth 1955 and the procedure verified as Insertion of Central Venous Catheter.  A Time Out was held and the above information confirmed.    Under sterile conditions the skin over and under the left clavicle was prepped with Chlorhexidine and covered with a sterile drape.  Strict sterile conditions were maintained,  Cap, mask, and sterile gloves were worn by all participants. 5 ml of Lidocaine 1% local anesthetic was infiltrated into the skin and subcutaneous tissues.  A 22-gauge needle was used to identify the vein under ultrasound guidance. Using Sledinger technique an 18-gauge needle was then inserted into the vein, and dark venous blood returned. A guide wire was then passed easily through the catheter.  There were no arrhthymias noted.The catheter was then withdrawn leaving the wire in. A 7.0 Taiwanese catheter was then inserted into the vessel over the guide wire.  All ports were flushed with sterile solution and had good non-pulsatile blood return. The catheter was sutured into place and an occlusive sterile  dressing applied.  The patient tolerated the procedure well with no change in vital signs.     Number of attempts: 2    A chest x-ray was ordered and reviewed which confirmed the placement of triple lumen central catheter in the left subclavian vein and no pneumothorax identified.      Condition: Patient tolerated the procedure and remain hemodynamically stable.    Dr Narvaez was present and available during the procedure.    ________________________________________________________________________  Moses Jackson MD. [facility]  10/5/65487:42 PM

## 2023-10-05 NOTE — PLAN OF CARE
Major Shift Events:  Drowsy with intermittent episodes of restlessness. Unable to determine orientation fully due to garble speech/word finding difficulty. Follows commands briskly as able. Moves upper extremities without difficulty. BLE slight contraction, no movement in left lower leg. Sinus rhythm to sinus tach with frequent PAC's and PVC's. MAP goal greater than 65, art line positional and does not always correlate with cuff pressures.  No intervention needed to meet BP goals. Lung sounds clear, infrequent nonproductive cough noted. TF @ 50 with standard flushes. Multiple loose stools during the night, anuria.   Plan: Possible hemodialysis. Continue to monitor and notify team of any changes.   For vital signs and complete assessments, please see documentation flowsheets.

## 2023-10-05 NOTE — PROGRESS NOTES
CLINICAL NUTRITION SERVICES - BRIEF NOTE     Reason for RD note: Change to renal formula d/t hyperphosphatemia and uptrending K+ with plans to transition from CRRT to iHD    New Findings/Chart Review:  Nutrition support: via NDT: Pivot 1.5 Juvencio (or equivalent) @ goal of  50ml/hr  (1200ml/day) + 3 pkts prosource TF20 provides: 2040 kcals (28kcal/kg), 172 g PRO (2.4g/kg), 900 ml free H20, 206 g CHO, and 9 g fiber daily.     Renal: CRRT stopped 10/4, iHD PRN.  Protein needs lowered off CRRT, aiming for higher end of 1.5-2g/kg for wound healing.    GI: frequent stooling following escalated bowel regimen - liquid tylenol changed to tablet.  Rectal pouch to be placed.    Labs: phos 6.8 (H) and K+ 5.0 - both uptrending     Meds: Reviewed    Interventions:  Adjust EN:  Novasource Renal (or equivalent) @ 35 ml/hr (840 ml) + 3 pkts prosource TF 20 provides 1920 kcal (27 kcal/kg), 136 g pro (1.9 g/kg), 154 g CHO, 602 ml free water, and 0 g fiber daily.     Future/Additional Recommendations:  - Monitor EN tolerance to new formula, stool trends.    Nutrition will continue to follow per protocol.    Natty Zeng, RD, LD, [facility]  [address] pager: [phone]  Ascom: [id number]  Weekend/Holiday RD pager [phone]

## 2023-10-05 NOTE — PROGRESS NOTES
STAFF NOTE:  Off high flow nasal cannula, and durably off pressors  Discontinued CRRT yesterday    Diffuse anasarca is better.   Abdominal wound site looks fine  Vascular access sites look fine; the right internal jugular introducer site is fine, the femoral dialysis line site still looks ok; arterial lines are ok  LLE is mottled, more demarcated.  I still don't see obvious signs of concurrent infection.  There are some blistering on the thigh that is healing ok and appears well-perfused.    a wound vac on the calf has normal output    Labs show a slight increase in acidosis since stopping CRRT, although BUN is up pretty dramatically  Potassium is ok  AST came back down slighytl  BNP about 5k, but I don't really have a baseline  CBC still pending    This is a 68M hx HTN, TIA, s/p open contained AAA rupture s/p repair with prolonged suprarenal and juxtarenal clamp.  Also had an unsuccessfull LLE embolectomy without perfusion to the AMRY JANE from the popliteal downwand.      Today we will remove the femoral dialysis line and potentially place a new internal jugular dialysis line.  We can continue to wean steroids.  We will place a aparicio and trial high-dose lasix.        The current plan for amputation is Tuesday.  Assuming this remains true, will ask the Acute Pain service to place an epidural on Monday or so.    METABOLIC ENCEPHALOPATHY / DELIRIUM:  -melatonin + seroquel for sleep   -Precedex infusion can probably come off today.    -has needed restraints to keep from pulling at lines    ACUTE HYPOXIC RESPIRATORY FAILURE  -resolved with aggressive volume removal with CRRT.    -continue to wean steroids today.    ISCHEMIC LLE:  -s/p fasciotomy.  Starting to demarcate / become gangrenous now, although I don't see definite signs of superimposed infection.  I anticipate above knee amputation on Tuesday.    -gabapentin 100tid for neuropathic pain  -Acute Pain Service reported they would be willing to offer epidural prior to  amputation, and probably would place the day before surgery, so will plan on holding heparin on Monday    THROMBOCYTOPENIA:  -continues to improve.  Will be willing to transfuse platelets if Pain service thinks it appropriate for epidural placement.  As above, ok to continue to wean steroids.    ABDOMINAL AORTIC RUPTURE:  -s/p open repair  -abdomen closed  -IV & enteral narcotics available for analgesia.  Also has scheduled tylenol and precedex drip    SEVERE PROTEIN-CALORIE MALNUTRITION:  -post-pyloric tube feeds are Pivot 1.5 at 50ml/h as goal     ACUTE KIDNEY FAILURE:  -contributing factors are massive hemorrhage, prolonged perirenal aortic clamp, possible ongoing infection / inflammation from the gangrenous leg  -will place aparicio and try high-dose lasix today.  Remove femoral dialysis line; if he needs further dialysis, will require the RIJ CVC.    MISC:  -Code status is full code  -family updated at bedside  -SQH rather than lovenox given renal dysfunction; SQH will need to be held on Monday before epidural; PPI can continue until steroids have completed   -lines: RIJ MAC introducer will probably get exchanged for a dialysis line; femoral dialysis line will come out, arterial linecan .  May need a new left internal jugular 3 lumen for stable venous access if we replace his MAC introducer with an internal jugular dialysis line  -aparicio not necessary given anuria.  Intermittent straight caths are ok  -anticipate discharge to transitional care unit in >1 week, and ultimate discharge to skilled nursing.  Amputation will be Tuesday    Billing statement: 39min of critical care time; spent in an initial review of imaging, labs, physical exam, and discussion of the patient with my own team and the extended care team including the primary service. Based on this patient's presentation / recent intervention and my bedside assessment, I felt there was or is a reasonably high probability of imminent or life-threatening  deterioration today or tonight for respiratory or hemodynamic reasons.   My overall critical care time, as described in detail above, includes such things as coordination of care, arrhythmia and hemodynamics management with infusions of medicines, respiratory management, fluid therapy including fluid boluses, and pain and sedation therapy. This time excludes time I spent personally performing or supervising procedures for this patient.    SHAMEKA Narvaez MD  Clinical   Anesthesia / Critical Care  *[phone]

## 2023-10-05 NOTE — PROGRESS NOTES
Vascular Surgery Progress Note  10/05/2023       Subjective:  Continues to have delirium with occasional enunciation of words On/off precedex. Was negative 500ml on I/Os' yesterday, +600ml since midnight. With hypotension episodes last night, responded to 25% albumin. Remains off pressors. On 4L NC.  CVVH stopped yesterday, with slight increase in acidosis otherwise stable.    Objective:  Temp:  [98.6  F (37  C)-99.9  F (37.7  C)] 99.1  F (37.3  C)  Pulse:  [] 97  Resp:  [7-25] 12  BP: ()/(51-79) 96/69  MAP:  [56 mmHg-89 mmHg] 72 mmHg  Arterial Line BP: ()/(44-67) 109/57  FiO2 (%):  [50 %] 50 %  SpO2:  [89 %-100 %] 94 %    I/O last 3 completed shifts:  In: 1761.14 [I.V.:441.14; NG/GT:270]  Out: 1015.6 [Urine:10; Other:1005.6]      Gen: Alert, follows commands in all extremities, appears to be neurologically intact, able to enunciate words and form sentences  CV: RR per radial pulse, off pressors, sinus tach per tele with PACs  Resp: On 4L NC  Abd: Wound vac in place, holding seal, abdomen soft to palpation around VAC.    Ext: RLE wwp. LLE cool to touch, dusky, continues to have redness on dorsal aspect of foot. Blistering appreciated on proximal calf posteriorly. no DP/PT signals on LLE. VAC changed 10/4/23    UOP: None since removal of aparicio  Temp abd closure output: 0ml over the last 24 hours.     Labs:  Recent Labs   Lab 10/04/23  1241 10/04/23  0334 10/03/23  1953   WBC 14.4* 16.7* 16.2*   HGB 7.6* 7.6* 7.6*   PLT 75* 75* 66*       Recent Labs   Lab 10/05/23  0356 10/05/23  0349 10/04/23  2338 10/04/23  1244 10/04/23  1241 10/04/23  0822 10/04/23  0334   NA  --  139  139  --   --  138  --  138  138   POTASSIUM  --  4.9  4.9  --   --  4.8  --  4.6  4.6   CHLORIDE  --  105  105  --   --  105  --  106  106   CO2  --  18*  18*  --   --  21*  --  19*  19*   BUN  --  91.5*  91.5*  --   --  55.2*  --  63.4*  63.4*   CR  --  2.84*  2.84*  --   --  1.83*  --  1.93*  1.93*   * 184*   184* 167*   < > 114*   < > 122*  122*   OMAR  --  7.5*  7.5*  --   --  7.9*  --  8.1*  8.1*   MAG  --  2.7*  --   --  2.5*  --  2.5*   PHOS  --  6.9*  --   --  5.7*  --  4.8*    < > = values in this interval not displayed.      Assessment/Plan:   68 year old male with PMH of HTN and previous TIA who presented with R sided abdominal pain found to have contained ruptured AAA, now s/p open AAA repair 9/24 into 9/25am with 30 min supraceliac clamp time, prolonged inter-renal clamp time, temporary abdominal closure. He remains critically ill in the ICU. He did have bloody stool postop with flex sig 9/25 demonstrating ischemic mucosal changes of the rectum, however on repeated abdominal looks he has had no evidence of transmural necrosis of the small or large intestine, or the rectum. 9/29 he was started on CRRT given ongoing low UOP and rising Cr and failure of lasix challenge. Hemodynamically he continues to do well off pressors. Ongoing absent signals with ischemic LLE, note DVT in this leg, this is to be expected given ischemia and absent inflow. We will continue to monitor LLE given it is not currently making patient systemically ill and there is no indication for urgent intervention. S/p closure of abdominal fascia and subcutaneous tissue left open with wound VAC applied 10/2/23. Plan for L AKA potentially next week on Tuesday.    - Appreciate excellent ICU care.  - Okay off antibiotics, hemodynamically stable no signs or symptoms of sepsis, with improved oxygenation requirements. Leukocytosis improved.    - Goal SBP <160, MAP >65  - Tolerates TF at goal for x2 days, without residuals. Appreciate nutrition consult.  - Appreciate nephrology recommendations  - CRRT on hold for now, new Lu catheter placed, Lasix trial prior to resuming CRRT.  - Thrombocytopenia improving, SQH  - Weaning IV steroids  - Potential VAC change with WOC tomorrow   - There is DVT of LLE popliteal vein and we expect there to be   thrombosis from stasis in the tibial and popliteal veins as this is the level the patient is ischemic. No indication to anticoagulate for this.     Discussed pt history, exam, assessment and formulated plan with Dr. Lowe of the vascular surgery service, who is in agreement with the above.      Miryam Carrasco [employer]  Vascular Surgery  Pager: [phone]  [email or web address]  Send message or 10 digit call back number Securely via Sensinode with the Sensinode Web Console (learn more here)

## 2023-10-05 NOTE — PROGRESS NOTES
Nephrology Progress Note  10/05/2023       Alfredo Burnham is a 68 yom with complex hx of TIA, HTN, blindness who presented to [facility] 9/24 with severe abdominal pain radiating to flank and back, CT revealed AAA with a contained rupture.  Taken to OR for aortobiiliac bypass and resection of ruptured pararenal aneurysm.   Post op course complicated by hypotension requiring pressors and metabolic acidosis.  Baseline Cr 1.0 on presentation but on the rise to >5 at time of renal consult for MAYA management and possible RRT.       Interval History :   Mr Burnham had CRRT stopped yesterday, labs reasonable this am although bicarb is a bit low at 18, K 4.9.  Can remove femoral HD line, anticipate running HD tomorrow with plan to re-wire RIJ to temp HD line to allow some time before committing to tunneled line.  Anticipate renal recovery eventually as BP's have been stable but still does need R leg amputation still.      Assessment & Recommendations:   MAYA-Baseline Cr 1.0, ordered UA.  CT showed benign cyst but otherwise normal kidneys.  Cr on the rise since surgery, did receive contrast for CTA.  Urine microscopy showed granular casts suggesting ATN which will recover with time and stabilizing hemodynamics.  Started on CRRT 9/30 for volume and clearance with minimal UOP and rising Cr.                  -Started CRRT 9/30 for volume and clearance, stopped 10/4 and now running iHD PRN and watching for recovery.                  -Dialysis consent signed and scanned into media tab.      Volume-Total body volume overloaded but much of it is likely 3rd spaced with low albumin and acute illness.  Net negative with CRRT stopping mid-afternoon.  Plan for HD tomorrow, will      Electrolytes-K 4.9, bicarb 18 with borderline AG.  Will follow with CRRT and consider increasing dose if it remains low, Na 140.        BMD-Ca 7.6, Mg and Phos mildly up.       Anemia-Hgb 7.9 and stable, ~14 on presentation, acute management per team.      "  Nutrition-On TF      Time spent: 40 minutes on this date of encounter for chart review, physical exam, medical decision making and co-ordination of care.      Seen and discussed with Dr Oconnor     Recommendations were communicated to primary team via verbal communication.        ALTAGRACIA Jiménez CNS  Clinical Nurse Specialist  [phone]    Review of Systems:   I reviewed the following systems:  ROS not done due to lethargy    Physical Exam:   I/O last 3 completed shifts:  In: 1761.14 [I.V.:441.14; NG/GT:270]  Out: 1015.6 [Urine:10; Other:1005.6]   /74   Pulse 102   Temp 98.5  F (36.9  C) (Axillary)   Resp 22   Ht 1.727 m (5' 8\")   Wt 89.1 kg (196 lb 6.9 oz)   SpO2 92%   BMI 29.87 kg/m       GENERAL APPEARANCE: Extubated, lethargic, in no distress.   EYES: No scleral icterus  Pulmonary: On vent 40%/8 of PEEP  CV: Regular rhythm, normal rate   - Edema +2-3 generalized, ++ scrotal edema.    GI: distended, nontender  MS: no evidence of inflammation in joints, no muscle tenderness  : No Lu  SKIN: no rash, warm, dry  NEURO: No focal deficits.         Labs:   All labs reviewed by me  Electrolytes/Renal -   Recent Labs   Lab Test 10/05/23  1248 10/05/23  0822 10/05/23  0356 10/05/23  0349 10/04/23  1244 10/04/23  1241     --   --  139  139  --  138   POTASSIUM 5.0  --   --  4.9  4.9  --  4.8   CHLORIDE 106  --   --  105  105  --  105   CO2 17*  --   --  18*  18*  --  21*   .0*  --   --  91.5*  91.5*  --  55.2*   CR 3.33*  --   --  2.84*  2.84*  --  1.83*   * 140* 179* 184*  184*   < > 114*   OMAR 7.6*  --   --  7.5*  7.5*  --  7.9*   MAG 2.7*  --   --  2.7*  --  2.5*   PHOS 6.8*  --   --  6.9*  --  5.7*    < > = values in this interval not displayed.         CBC -   Recent Labs   Lab Test 10/05/23  1248 10/05/23  0819 10/04/23  1241   WBC 13.5* 12.2* 14.4*   HGB 7.9* 6.9* 7.6*   PLT 95* 94* 75*         LFTs -   Recent Labs   Lab Test 10/05/23  0349 10/04/23  1241 " 10/04/23  0334 10/03/23  1208 10/03/23  0343   ALKPHOS 252*  --  234*  --  142*   BILITOTAL 0.6  --  0.9  --  1.1   ALT 31  --  30  --  21   AST 54*  --  80*  --  64*   PROTTOTAL 5.0*  --  5.3*  --  5.0*   ALBUMIN 2.6* 2.7* 2.7*  2.7*   < > 2.6*  2.6*    < > = values in this interval not displayed.         Iron Panel - No lab results found.        Current Medications:   acetaminophen  975 mg Oral or Feeding Tube Q6H    B and C vitamin Complex with folic acid  10 mL Per Feeding Tube Daily    calcium gluconate  2 g Intravenous Once    gabapentin  100 mg Oral Q8H COLUMBA    heparin ANTICOAGULANT  5,000 Units Subcutaneous Q8H    hydrocortisone sodium succinate PF  50 mg Intravenous Daily    insulin aspart  1-12 Units Subcutaneous Q4H    melatonin  5 mg Oral or Feeding Tube QPM    pantoprazole  40 mg Per Feeding Tube QAM AC    polyethylene glycol  17 g Oral or Feeding Tube Daily    protein modular  1 packet Per Feeding Tube TID    QUEtiapine  25 mg Oral or Feeding Tube At Bedtime    senna-docusate  1 tablet Oral or Feeding Tube BID    sodium chloride (PF)  3 mL Intracatheter Q8H    thiamine  100 mg Oral or Feeding Tube BID      dexmedeTOMIDine Stopped (10/05/23 1405)    dextrose      dextrose      norepinephrine Stopped (10/04/23 1400)

## 2023-10-06 ENCOUNTER — APPOINTMENT (OUTPATIENT)
Dept: CT IMAGING | Facility: CLINIC | Age: 68
DRG: 268 | End: 2023-10-06
Payer: COMMERCIAL

## 2023-10-06 ENCOUNTER — APPOINTMENT (OUTPATIENT)
Dept: GENERAL RADIOLOGY | Facility: CLINIC | Age: 68
DRG: 268 | End: 2023-10-06
Attending: PHYSICIAN ASSISTANT
Payer: COMMERCIAL

## 2023-10-06 LAB
ALBUMIN SERPL BCG-MCNC: 2.4 G/DL (ref 3.5–5.2)
ALP SERPL-CCNC: 253 U/L (ref 40–129)
ALT SERPL W P-5'-P-CCNC: 28 U/L (ref 0–70)
ANION GAP SERPL CALCULATED.3IONS-SCNC: 16 MMOL/L (ref 7–15)
ANION GAP SERPL CALCULATED.3IONS-SCNC: 17 MMOL/L (ref 7–15)
ANION GAP SERPL CALCULATED.3IONS-SCNC: 17 MMOL/L (ref 7–15)
ANION GAP SERPL CALCULATED.3IONS-SCNC: 18 MMOL/L (ref 7–15)
ANION GAP SERPL CALCULATED.3IONS-SCNC: 20 MMOL/L (ref 7–15)
APTT PPP: 31 SECONDS (ref 22–38)
AST SERPL W P-5'-P-CCNC: 40 U/L (ref 0–45)
BACTERIA BLD CULT: NO GROWTH
BACTERIA BLD CULT: NO GROWTH
BILIRUB SERPL-MCNC: 0.4 MG/DL
BLD PROD TYP BPU: NORMAL
BLD PROD TYP BPU: NORMAL
BLOOD COMPONENT TYPE: NORMAL
BLOOD COMPONENT TYPE: NORMAL
BUN SERPL-MCNC: 131 MG/DL (ref 8–23)
BUN SERPL-MCNC: 131 MG/DL (ref 8–23)
BUN SERPL-MCNC: 136 MG/DL (ref 8–23)
BUN SERPL-MCNC: 77.2 MG/DL (ref 8–23)
BUN SERPL-MCNC: 85.2 MG/DL (ref 8–23)
CA-I BLD-MCNC: 4.3 MG/DL (ref 4.4–5.2)
CA-I BLD-MCNC: 4.4 MG/DL (ref 4.4–5.2)
CALCIUM SERPL-MCNC: 7.8 MG/DL (ref 8.8–10.2)
CALCIUM SERPL-MCNC: 7.9 MG/DL (ref 8.8–10.2)
CALCIUM SERPL-MCNC: 7.9 MG/DL (ref 8.8–10.2)
CALCIUM SERPL-MCNC: 8 MG/DL (ref 8.8–10.2)
CALCIUM SERPL-MCNC: 8.1 MG/DL (ref 8.8–10.2)
CHLORIDE SERPL-SCNC: 103 MMOL/L (ref 98–107)
CHLORIDE SERPL-SCNC: 106 MMOL/L (ref 98–107)
CHLORIDE SERPL-SCNC: 106 MMOL/L (ref 98–107)
CHLORIDE SERPL-SCNC: 98 MMOL/L (ref 98–107)
CHLORIDE SERPL-SCNC: 98 MMOL/L (ref 98–107)
CODING SYSTEM: NORMAL
CODING SYSTEM: NORMAL
CREAT SERPL-MCNC: 2.86 MG/DL (ref 0.67–1.17)
CREAT SERPL-MCNC: 3.15 MG/DL (ref 0.67–1.17)
CREAT SERPL-MCNC: 4.15 MG/DL (ref 0.67–1.17)
CREAT SERPL-MCNC: 4.15 MG/DL (ref 0.67–1.17)
CREAT SERPL-MCNC: 4.65 MG/DL (ref 0.67–1.17)
CROSSMATCH: NORMAL
CROSSMATCH: NORMAL
DEPRECATED HCO3 PLAS-SCNC: 15 MMOL/L (ref 22–29)
DEPRECATED HCO3 PLAS-SCNC: 16 MMOL/L (ref 22–29)
DEPRECATED HCO3 PLAS-SCNC: 16 MMOL/L (ref 22–29)
DEPRECATED HCO3 PLAS-SCNC: 24 MMOL/L (ref 22–29)
DEPRECATED HCO3 PLAS-SCNC: 24 MMOL/L (ref 22–29)
EGFRCR SERPLBLD CKD-EPI 2021: 13 ML/MIN/1.73M2
EGFRCR SERPLBLD CKD-EPI 2021: 15 ML/MIN/1.73M2
EGFRCR SERPLBLD CKD-EPI 2021: 15 ML/MIN/1.73M2
EGFRCR SERPLBLD CKD-EPI 2021: 20 ML/MIN/1.73M2
EGFRCR SERPLBLD CKD-EPI 2021: 23 ML/MIN/1.73M2
ERYTHROCYTE [DISTWIDTH] IN BLOOD BY AUTOMATED COUNT: 19 % (ref 10–15)
ERYTHROCYTE [DISTWIDTH] IN BLOOD BY AUTOMATED COUNT: 19.2 % (ref 10–15)
ERYTHROCYTE [DISTWIDTH] IN BLOOD BY AUTOMATED COUNT: 19.3 % (ref 10–15)
GLUCOSE BLDC GLUCOMTR-MCNC: 121 MG/DL (ref 70–99)
GLUCOSE BLDC GLUCOMTR-MCNC: 122 MG/DL (ref 70–99)
GLUCOSE BLDC GLUCOMTR-MCNC: 130 MG/DL (ref 70–99)
GLUCOSE BLDC GLUCOMTR-MCNC: 144 MG/DL (ref 70–99)
GLUCOSE BLDC GLUCOMTR-MCNC: 149 MG/DL (ref 70–99)
GLUCOSE BLDC GLUCOMTR-MCNC: 169 MG/DL (ref 70–99)
GLUCOSE BLDC GLUCOMTR-MCNC: 172 MG/DL (ref 70–99)
GLUCOSE SERPL-MCNC: 124 MG/DL (ref 70–99)
GLUCOSE SERPL-MCNC: 129 MG/DL (ref 70–99)
GLUCOSE SERPL-MCNC: 129 MG/DL (ref 70–99)
GLUCOSE SERPL-MCNC: 147 MG/DL (ref 70–99)
GLUCOSE SERPL-MCNC: 178 MG/DL (ref 70–99)
HBV SURFACE AB SERPL IA-ACNC: 60.07 M[IU]/ML
HBV SURFACE AB SERPL IA-ACNC: REACTIVE M[IU]/ML
HBV SURFACE AG SERPL QL IA: NONREACTIVE
HCT VFR BLD AUTO: 24 % (ref 40–53)
HCT VFR BLD AUTO: 24.4 % (ref 40–53)
HCT VFR BLD AUTO: 25 % (ref 40–53)
HGB BLD-MCNC: 7.8 G/DL (ref 13.3–17.7)
HGB BLD-MCNC: 8.1 G/DL (ref 13.3–17.7)
HGB BLD-MCNC: 8.4 G/DL (ref 13.3–17.7)
INR PPP: 1.19 (ref 0.85–1.15)
LACTATE SERPL-SCNC: 1.5 MMOL/L (ref 0.7–2)
LACTATE SERPL-SCNC: 1.5 MMOL/L (ref 0.7–2)
MAGNESIUM SERPL-MCNC: 2.6 MG/DL (ref 1.7–2.3)
MAGNESIUM SERPL-MCNC: 2.6 MG/DL (ref 1.7–2.3)
MCH RBC QN AUTO: 31.7 PG (ref 26.5–33)
MCH RBC QN AUTO: 32.2 PG (ref 26.5–33)
MCH RBC QN AUTO: 33.1 PG (ref 26.5–33)
MCHC RBC AUTO-ENTMCNC: 32 G/DL (ref 31.5–36.5)
MCHC RBC AUTO-ENTMCNC: 33.6 G/DL (ref 31.5–36.5)
MCHC RBC AUTO-ENTMCNC: 33.8 G/DL (ref 31.5–36.5)
MCV RBC AUTO: 96 FL (ref 78–100)
MCV RBC AUTO: 98 FL (ref 78–100)
MCV RBC AUTO: 99 FL (ref 78–100)
PHOSPHATE SERPL-MCNC: 7 MG/DL (ref 2.5–4.5)
PHOSPHATE SERPL-MCNC: 7.1 MG/DL (ref 2.5–4.5)
PLATELET # BLD AUTO: 112 10E3/UL (ref 150–450)
PLATELET # BLD AUTO: 115 10E3/UL (ref 150–450)
PLATELET # BLD AUTO: 120 10E3/UL (ref 150–450)
POTASSIUM SERPL-SCNC: 3.7 MMOL/L (ref 3.4–5.3)
POTASSIUM SERPL-SCNC: 4.2 MMOL/L (ref 3.4–5.3)
POTASSIUM SERPL-SCNC: 5.1 MMOL/L (ref 3.4–5.3)
POTASSIUM SERPL-SCNC: 5.1 MMOL/L (ref 3.4–5.3)
POTASSIUM SERPL-SCNC: 5.6 MMOL/L (ref 3.4–5.3)
PROT SERPL-MCNC: 4.9 G/DL (ref 6.4–8.3)
RADIOLOGIST FLAGS: ABNORMAL
RADIOLOGIST FLAGS: ABNORMAL
RBC # BLD AUTO: 2.45 10E6/UL (ref 4.4–5.9)
RBC # BLD AUTO: 2.46 10E6/UL (ref 4.4–5.9)
RBC # BLD AUTO: 2.61 10E6/UL (ref 4.4–5.9)
SODIUM SERPL-SCNC: 138 MMOL/L (ref 135–145)
SODIUM SERPL-SCNC: 138 MMOL/L (ref 135–145)
SODIUM SERPL-SCNC: 139 MMOL/L (ref 135–145)
SODIUM SERPL-SCNC: 139 MMOL/L (ref 135–145)
SODIUM SERPL-SCNC: 140 MMOL/L (ref 135–145)
TROPONIN T SERPL HS-MCNC: 112 NG/L
UNIT ABO/RH: NORMAL
UNIT ABO/RH: NORMAL
UNIT NUMBER: NORMAL
UNIT NUMBER: NORMAL
UNIT STATUS: NORMAL
UNIT STATUS: NORMAL
UNIT TYPE ISBT: 5100
UNIT TYPE ISBT: 5100
WBC # BLD AUTO: 12.9 10E3/UL (ref 4–11)
WBC # BLD AUTO: 14.1 10E3/UL (ref 4–11)
WBC # BLD AUTO: 16.7 10E3/UL (ref 4–11)

## 2023-10-06 PROCEDURE — 999N000127 HC STATISTIC PERIPHERAL IV START W US GUIDANCE

## 2023-10-06 PROCEDURE — 250N000011 HC RX IP 250 OP 636: Performed by: SURGERY

## 2023-10-06 PROCEDURE — 250N000013 HC RX MED GY IP 250 OP 250 PS 637: Performed by: SURGERY

## 2023-10-06 PROCEDURE — 250N000013 HC RX MED GY IP 250 OP 250 PS 637: Performed by: STUDENT IN AN ORGANIZED HEALTH CARE EDUCATION/TRAINING PROGRAM

## 2023-10-06 PROCEDURE — 999N000128 HC STATISTIC PERIPHERAL IV START W/O US GUIDANCE

## 2023-10-06 PROCEDURE — 250N000013 HC RX MED GY IP 250 OP 250 PS 637: Performed by: ANESTHESIOLOGY

## 2023-10-06 PROCEDURE — 70498 CT ANGIOGRAPHY NECK: CPT

## 2023-10-06 PROCEDURE — 83735 ASSAY OF MAGNESIUM: CPT | Performed by: NURSE PRACTITIONER

## 2023-10-06 PROCEDURE — 83605 ASSAY OF LACTIC ACID: CPT | Performed by: NURSE PRACTITIONER

## 2023-10-06 PROCEDURE — 200N000002 HC R&B ICU UMMC

## 2023-10-06 PROCEDURE — 250N000013 HC RX MED GY IP 250 OP 250 PS 637

## 2023-10-06 PROCEDURE — 80048 BASIC METABOLIC PNL TOTAL CA: CPT | Performed by: NURSE PRACTITIONER

## 2023-10-06 PROCEDURE — 70450 CT HEAD/BRAIN W/O DYE: CPT

## 2023-10-06 PROCEDURE — 99024 POSTOP FOLLOW-UP VISIT: CPT | Performed by: NURSE PRACTITIONER

## 2023-10-06 PROCEDURE — 82330 ASSAY OF CALCIUM: CPT | Performed by: NURSE PRACTITIONER

## 2023-10-06 PROCEDURE — 87340 HEPATITIS B SURFACE AG IA: CPT | Performed by: CLINICAL NURSE SPECIALIST

## 2023-10-06 PROCEDURE — 85610 PROTHROMBIN TIME: CPT

## 2023-10-06 PROCEDURE — 250N000009 HC RX 250

## 2023-10-06 PROCEDURE — 0042T CT HEAD PERFUSION W CONTRAST: CPT | Performed by: RADIOLOGY

## 2023-10-06 PROCEDURE — 999N000065 XR CHEST PORT 1 VIEW

## 2023-10-06 PROCEDURE — 86706 HEP B SURFACE ANTIBODY: CPT | Performed by: CLINICAL NURSE SPECIALIST

## 2023-10-06 PROCEDURE — 93005 ELECTROCARDIOGRAM TRACING: CPT

## 2023-10-06 PROCEDURE — 250N000011 HC RX IP 250 OP 636: Mod: JZ | Performed by: STUDENT IN AN ORGANIZED HEALTH CARE EDUCATION/TRAINING PROGRAM

## 2023-10-06 PROCEDURE — G0463 HOSPITAL OUTPT CLINIC VISIT: HCPCS

## 2023-10-06 PROCEDURE — 84100 ASSAY OF PHOSPHORUS: CPT | Performed by: NURSE PRACTITIONER

## 2023-10-06 PROCEDURE — 80048 BASIC METABOLIC PNL TOTAL CA: CPT | Performed by: PHYSICIAN ASSISTANT

## 2023-10-06 PROCEDURE — 99207 PR NO BILLABLE SERVICE THIS VISIT: CPT | Performed by: INTERNAL MEDICINE

## 2023-10-06 PROCEDURE — 250N000011 HC RX IP 250 OP 636: Mod: JZ

## 2023-10-06 PROCEDURE — 93010 ELECTROCARDIOGRAM REPORT: CPT | Performed by: INTERNAL MEDICINE

## 2023-10-06 PROCEDURE — 80048 BASIC METABOLIC PNL TOTAL CA: CPT

## 2023-10-06 PROCEDURE — 36556 INSERT NON-TUNNEL CV CATH: CPT | Mod: GC | Performed by: ANESTHESIOLOGY

## 2023-10-06 PROCEDURE — 85027 COMPLETE CBC AUTOMATED: CPT

## 2023-10-06 PROCEDURE — 70496 CT ANGIOGRAPHY HEAD: CPT | Mod: 26 | Performed by: RADIOLOGY

## 2023-10-06 PROCEDURE — 85730 THROMBOPLASTIN TIME PARTIAL: CPT

## 2023-10-06 PROCEDURE — 99233 SBSQ HOSP IP/OBS HIGH 50: CPT | Mod: 25 | Performed by: ANESTHESIOLOGY

## 2023-10-06 PROCEDURE — 71045 X-RAY EXAM CHEST 1 VIEW: CPT | Mod: 26 | Performed by: RADIOLOGY

## 2023-10-06 PROCEDURE — 84484 ASSAY OF TROPONIN QUANT: CPT

## 2023-10-06 PROCEDURE — 90937 HEMODIALYSIS REPEATED EVAL: CPT

## 2023-10-06 PROCEDURE — 70498 CT ANGIOGRAPHY NECK: CPT | Mod: 26 | Performed by: RADIOLOGY

## 2023-10-06 PROCEDURE — 258N000003 HC RX IP 258 OP 636: Performed by: CLINICAL NURSE SPECIALIST

## 2023-10-06 PROCEDURE — 0042T CT HEAD PERFUSION W CONTRAST: CPT

## 2023-10-06 RX ORDER — MIDODRINE HYDROCHLORIDE 5 MG/1
15 TABLET ORAL DAILY PRN
Status: DISCONTINUED | OUTPATIENT
Start: 2023-10-06 | End: 2023-10-07

## 2023-10-06 RX ORDER — NYSTATIN 100000/ML
500000 SUSPENSION, ORAL (FINAL DOSE FORM) ORAL 4 TIMES DAILY
Status: DISCONTINUED | OUTPATIENT
Start: 2023-10-06 | End: 2023-10-19

## 2023-10-06 RX ORDER — IOPAMIDOL 755 MG/ML
107 INJECTION, SOLUTION INTRAVASCULAR ONCE
Status: COMPLETED | OUTPATIENT
Start: 2023-10-06 | End: 2023-10-06

## 2023-10-06 RX ORDER — HEPARIN SODIUM 10000 [USP'U]/100ML
0-5000 INJECTION, SOLUTION INTRAVENOUS CONTINUOUS
Status: DISCONTINUED | OUTPATIENT
Start: 2023-10-06 | End: 2023-10-07

## 2023-10-06 RX ORDER — NOREPINEPHRINE BITARTRATE 0.06 MG/ML
.01-.15 INJECTION, SOLUTION INTRAVENOUS CONTINUOUS
Status: DISCONTINUED | OUTPATIENT
Start: 2023-10-06 | End: 2023-10-07

## 2023-10-06 RX ADMIN — Medication 40 MG: at 06:30

## 2023-10-06 RX ADMIN — INSULIN ASPART 1 UNITS: 100 INJECTION, SOLUTION INTRAVENOUS; SUBCUTANEOUS at 20:24

## 2023-10-06 RX ADMIN — IOPAMIDOL 107 ML: 755 INJECTION, SOLUTION INTRAVENOUS at 20:54

## 2023-10-06 RX ADMIN — Medication 10 ML: at 07:56

## 2023-10-06 RX ADMIN — Medication 5 MG: at 20:25

## 2023-10-06 RX ADMIN — ACETAMINOPHEN 975 MG: 325 TABLET, FILM COATED ORAL at 23:52

## 2023-10-06 RX ADMIN — SODIUM CHLORIDE 250 ML: 9 INJECTION, SOLUTION INTRAVENOUS at 15:22

## 2023-10-06 RX ADMIN — THIAMINE HCL TAB 100 MG 100 MG: 100 TAB at 07:54

## 2023-10-06 RX ADMIN — HEPARIN SODIUM 5000 UNITS: 5000 INJECTION, SOLUTION INTRAVENOUS; SUBCUTANEOUS at 07:54

## 2023-10-06 RX ADMIN — HEPARIN SODIUM 5000 UNITS: 5000 INJECTION, SOLUTION INTRAVENOUS; SUBCUTANEOUS at 15:29

## 2023-10-06 RX ADMIN — ONDANSETRON 4 MG: 2 INJECTION INTRAMUSCULAR; INTRAVENOUS at 22:13

## 2023-10-06 RX ADMIN — ACETAMINOPHEN 975 MG: 325 TABLET, FILM COATED ORAL at 09:44

## 2023-10-06 RX ADMIN — ACETAMINOPHEN 975 MG: 325 TABLET, FILM COATED ORAL at 03:27

## 2023-10-06 RX ADMIN — MIDODRINE HYDROCHLORIDE 15 MG: 5 TABLET ORAL at 12:50

## 2023-10-06 RX ADMIN — SODIUM CHLORIDE 300 ML: 9 INJECTION, SOLUTION INTRAVENOUS at 15:23

## 2023-10-06 RX ADMIN — HEPARIN SODIUM 5000 UNITS: 5000 INJECTION, SOLUTION INTRAVENOUS; SUBCUTANEOUS at 00:10

## 2023-10-06 RX ADMIN — INSULIN ASPART 2 UNITS: 100 INJECTION, SOLUTION INTRAVENOUS; SUBCUTANEOUS at 15:48

## 2023-10-06 RX ADMIN — ACETAMINOPHEN 975 MG: 325 TABLET, FILM COATED ORAL at 15:29

## 2023-10-06 RX ADMIN — INSULIN ASPART 2 UNITS: 100 INJECTION, SOLUTION INTRAVENOUS; SUBCUTANEOUS at 11:16

## 2023-10-06 RX ADMIN — OXYCODONE HYDROCHLORIDE 10 MG: 10 TABLET ORAL at 05:09

## 2023-10-06 RX ADMIN — GABAPENTIN 100 MG: 250 SUSPENSION ORAL at 06:20

## 2023-10-06 RX ADMIN — NYSTATIN 500000 UNITS: 100000 SUSPENSION ORAL at 23:47

## 2023-10-06 RX ADMIN — QUETIAPINE FUMARATE 25 MG: 25 TABLET ORAL at 23:52

## 2023-10-06 RX ADMIN — GABAPENTIN 100 MG: 250 SUSPENSION ORAL at 15:30

## 2023-10-06 RX ADMIN — HYDROCORTISONE SODIUM SUCCINATE 50 MG: 100 INJECTION, POWDER, FOR SOLUTION INTRAMUSCULAR; INTRAVENOUS at 07:54

## 2023-10-06 RX ADMIN — NYSTATIN 500000 UNITS: 100000 SUSPENSION ORAL at 18:04

## 2023-10-06 RX ADMIN — GABAPENTIN 100 MG: 250 SUSPENSION ORAL at 23:52

## 2023-10-06 RX ADMIN — NOREPINEPHRINE BITARTRATE 0.03 MCG/KG/MIN: 0.06 INJECTION, SOLUTION INTRAVENOUS at 14:06

## 2023-10-06 RX ADMIN — HEPARIN SODIUM 1600 UNITS/HR: 10000 INJECTION, SOLUTION INTRAVENOUS at 23:22

## 2023-10-06 ASSESSMENT — ACTIVITIES OF DAILY LIVING (ADL)
ADLS_ACUITY_SCORE: 47

## 2023-10-06 NOTE — PLAN OF CARE
ICU End of Shift Summary. See flowsheets for vital signs and detailed assessment.  Care provided [id number]    Changes this shift: Alert, lethargic. Disoriented to situation, intermittently disoriented to time/place. Weaned dex, currently off. Weaned FiO2, currently on 1L NC. TF changed to renal diet. Aparicio placed and 120mg lasix given, 20ml urine out from 11am aparicio placement until remove at 1800. Rectal pouch placed due to frequent stools, unable to place rectal tube per colorectal and previous bowel ischemia. New Left Subclavian CVC placed. Hgb 6.9, 1 unit RBC given.     Plan: Reline Right internal jugular to HD line. Start intermittent iHD tomorrow. Left AKA Tuesday. Epidural nerve block prior to AKA, most likely on Monday. Continue to wean sedation and FiO2 as tolerated.       Goal Outcome Evaluation:      Plan of Care Reviewed With: patient, spouse    Overall Patient Progress: improvingOverall Patient Progress: improving

## 2023-10-06 NOTE — PLAN OF CARE
Goal Outcome Evaluation:    Neuro: Alert to self only. Lethargic, arouses to voice/stimulation. Whispers. Moves uppers, 3/5; wiggles R toes but no movement in L.   CV: HR 80-120s. SBP goal <160, MAP >65. Midodrine given during dialysis, levo on & off today with/after HD.   Pulm: 1L NC, clear to coarse lungs.   GI: NJ, TF at goal. Loose stools. Rectal pouch.   : Oliguric. HD today.   Surg: Wound vacs changed by vascular today.   IV/Gtt: R HD line now. L subclavian. L art line.     Will continue to monitor for safety and comfort.    Roro Olson RN

## 2023-10-06 NOTE — CONSULTS
[facility] Nurse Inpatient Assessment     Consulted for: penile wound, suspected PI      Patient History (according to Vascular provider note(s) 10/6/23:      68 year old male with PMH of HTN and previous TIA who presented with R sided abdominal pain found to have contained ruptured AAA, now s/p open AAA repair 9/24 into 9/25am with 30 min supraceliac clamp time, prolonged inter-renal clamp time, temporary abdominal closure. He remains critically ill in the ICU. He did have bloody stool postop with flex sig 9/25 demonstrating ischemic mucosal changes of the rectum, however on repeated abdominal looks he has had no evidence of transmural necrosis of the small or large intestine, or the rectum. 9/29 he was started on CRRT given ongoing low UOP and rising Cr and failure of lasix challenge. Hemodynamically he continues to do well off pressors. Ongoing absent signals with ischemic LLE, note DVT in this leg, this is to be expected given ischemia and absent inflow. We will continue to monitor LLE given it is not currently making patient systemically ill and there is no indication for urgent intervention. S/p closure of abdominal fascia and subcutaneous tissue left open with wound VAC applied 10/2/23. Plan for L AKA potentially next week on Tuesday.     Assessment:      Areas visualized during today's visit: Perineal area    Suspected Pressure Injury Location: penis     10/6: penile wound near meatus   10/6: same area post filemon cares- wound no longer present    Last photo: 10/6/23  Wound type: Unknown Etiology     Wound history/plan of care:   Pt had a aparicio previously, wound noted fy floor staff overnight. RN performed filemon cares and scab came off easily to reveal no injury underneath.     Wound base: 100 % intact skin     Palpation of the wound bed: normal      Drainage: none     Description of drainage: none     Measurements (length x width x depth, in cm) no open  areas     Tunneling N/A     Undermining N/A  Periwound skin: Intact      Color: normal and consistent with surrounding tissue      Temperature: normal   Odor: none  Pain: unable to assess due to  sedation , none  Pain intervention prior to dressing change: patient tolerated well  Treatment goal: Heal   STATUS: initial assessment  Supplies ordered: discussed with RN      Treatment Plan:       Orders:  none needed- leave area DEVYN    RECOMMEND PRIMARY TEAM ORDER: None, at this time  Education provided: plan of care  Discussed plan of care with: Nurse  [facility] nurse follow-up plan: signing off  Notify [facility] if wound(s) deteriorate.  Nursing to notify the Provider(s) and re-consult the [facility] Nurse if new skin concern.    DATA:     Current support surface: Standard  Low air loss (FARSHAD pump, Isolibrium, Pulsate, skin guard, etc)  Containment of urine/stool: Incontinent pad in bed  BMI: Body mass index is 29.87 kg/m .   Active diet order: Orders Placed This Encounter      NPO for Medical/Clinical Reasons Except for: Meds, Ice Chips     Output: I/O last 3 completed shifts:  In: 2369.6 [I.V.:459.6; NG/GT:590]  Out: 10 [Urine:10]     Labs:   Recent Labs   Lab 10/06/23  0330 10/02/23  0408 10/02/23  0305   ALBUMIN 2.4*   < > 2.5*  2.5*   PREALB  --   --  9*   HGB 7.8*   < > 7.4*   INR  --   --  1.30*   WBC 12.9*   < > 7.4    < > = values in this interval not displayed.     Pressure injury risk assessment:   Sensory Perception: 2-->very limited  Moisture: 2-->very moist  Activity: 2-->chairfast  Mobility: 3-->slightly limited  Nutrition: 3-->adequate  Friction and Shear: 2-->potential problem  Edson Score: 14    Natty Samson RN CWOCN  Pager no longer is use, please contact through My Ad Box group: [facility] Nurse [place]  Dept. Office Number: *[phone]

## 2023-10-06 NOTE — PLAN OF CARE
Major Shift Events: confused, lethargic, neuro status wax/wanes, consistently oriented to self, disoriented to situation & occasionally time, arouses to voice, follows simple commands, pupils equal/reactive- blind @ baseline, purposeful movement in uppers, localizes in RLE, no movement in LLE, Dex remains off, Oxy given x1 for pain SR-ST, freq PVC/PACs, SBP <160 w/o intervention, afebrile. 1L NC overnight, LS clear, good cough. Anuric, rectal pouch in place- stool in tubing, no output in pouch, TF running at 35 w/ SFWF via NJ. Skin unchanged.   Plan: Re-line R-internal jugular to HD line, intermittent HD starts today, L-AKA planned for Tuesday & epidural nerve block prior to procedure.   For vital signs and complete assessments, please see documentation flowsheets.

## 2023-10-06 NOTE — PROVIDER NOTIFICATION
0800: SICU rounding, team notified of R facial droop - no orders or concern noted from SICU.   Vascular paged - at bedside, no orders or concern noted.     1000: SICU at bedside around 1000 - bedside RN had multiple SICU members look at the R droop; no orders or concern noted.

## 2023-10-06 NOTE — PROGRESS NOTES
Vascular Surgery Progress Note  10/06/2023       Subjective:  Continues to have delirium with occasional enunciation of words off precedex. Was positive 2.5L on I/Os' yesterday, +600ml since midnight. Off CRRT, potential iHD, new internal jugular CVC placed by ICU team yesterday. On nasal cannula, with concern of potential facial droop this morning likely anatomical baseline given dry mouth, no signs of stroke, no other deficits on exam. No vasopressors since 9/29/23, no antihypertensives. Hemodynamically stable.     Objective:  Temp:  [98.3  F (36.8  C)-99.2  F (37.3  C)] 99.2  F (37.3  C)  Pulse:  [] 111  Resp:  [11-22] 14  BP: ()/(57-84) 131/74  MAP:  [65 mmHg-92 mmHg] 79 mmHg  Arterial Line BP: ()/(49-68) 131/59  SpO2:  [91 %-97 %] 92 %    I/O last 3 completed shifts:  In: 2369.6 [I.V.:459.6; NG/GT:590]  Out: 10 [Urine:10]      Gen: Alert, slightly drowsier this morning compared to yesterday, follows commands in all extremities, appears to be neurologically intact, able to enunciate words and form sentences.  CV: RR per radial pulse, off pressors, sinus tach per tele with PACs  Resp: On 1L NC, non-labored  Abd: Wound vac in place, holding seal, abdomen soft to palpation around VAC.    Ext: RLE wwp. LLE cool to touch, dusky, continues to have redness on dorsal aspect of foot. Blistering appreciated on proximal calf posteriorly. no DP/PT signals on LLE. VAC change today    UOP: None since removal of aparicio  Temp abd closure output: 0ml over the last 48 hours.     Labs:  Recent Labs   Lab 10/06/23  0330 10/05/23  1248 10/05/23  0819   WBC 12.9* 13.5* 12.2*   HGB 7.8* 7.9* 6.9*   * 95* 94*       Recent Labs   Lab 10/06/23  0337 10/06/23  0330 10/06/23  0014 10/05/23  1913 10/05/23  1912 10/05/23  1536 10/05/23  1248   NA  --  139  139  --   --  139  --  140   POTASSIUM  --  5.1  5.1  --   --  5.1  --  5.0   CHLORIDE  --  106  106  --   --  106  --  106   CO2  --  16*  16*  --   --  17*   --  17*   BUN  --  131.0*  131.0*  --   --  120.0*  --  107.0*   CR  --  4.15*  4.15*  --   --  3.80*  --  3.33*   * 129*  129* 130*   < > 165*   < > 166*  171*   OMAR  --  7.9*  7.9*  --   --  7.8*  --  7.6*   MAG  --  2.6*  --   --  2.7*  --  2.7*   PHOS  --  7.0*  --   --  6.9*  --  6.8*    < > = values in this interval not displayed.      Assessment/Plan:   68 year old male with PMH of HTN and previous TIA who presented with R sided abdominal pain found to have contained ruptured AAA, now s/p open AAA repair 9/24 into 9/25am with 30 min supraceliac clamp time, prolonged inter-renal clamp time, temporary abdominal closure. He remains critically ill in the ICU. He did have bloody stool postop with flex sig 9/25 demonstrating ischemic mucosal changes of the rectum, however on repeated abdominal looks he has had no evidence of transmural necrosis of the small or large intestine, or the rectum. On 9/29 he was started on CRRT given ongoing low UOP and rising Cr and failure of lasix challenge. Hemodynamically continues to do well off pressors since 9/29/23. Ongoing absent signals with ischemic LLE, note DVT in this leg, this is to be expected given ischemia and absent inflow. We will continue to monitor LLE given it is not currently making patient systemically ill and there is no indication for urgent intervention. S/p closure of abdominal fascia and subcutaneous tissue left open with wound VAC applied 10/2/23. Plan for L AKA potentially next week on Tuesday.    - Appreciate excellent ICU care.  - Slight mouth droop on right, no concern for stroke as no other symptoms, strength appears equal bilaterally in upper extremities, able to move RLE without deficits.    - Okay to transfer to floor if tolerates iHD  - Hemodynamically stable no signs or symptoms of sepsis, with improved oxygenation requirements, now on 1L NC. Leukocytosis stable.    - Goal SBP <160, MAP >65  - Tolerates TF at goal for x3 days, without  residuals. Appreciate nutrition consult.  - Appreciate nephrology recommendations  - Off CRRT, new Lu catheter placed 10/5/23, failed Lasix trial yesterday. Plan for iHD today   - Thrombocytopenia improving, SQH  - Weaning IV steroids  - VAC change today by vascular surgery  - There is DVT of LLE popliteal vein and we expect there to be  thrombosis from stasis in the tibial and popliteal veins as this is the level the patient is ischemic. No indication to anticoagulate for this.     Discussed pt history, exam, assessment and plan with Dr. Donald who will discuss with Dr. Lowe of the vascular surgery service.      Miryam Carrasco, CNP  Vascular Surgery  Pager: [phone]  [email or web address]  Send message or 10 digit call back number Securely via [email or web address] with the [email or web address] Web Console (learn more here)

## 2023-10-06 NOTE — PROGRESS NOTES
SPIRITUAL HEALTH SERVICES Progress Note  [facility] 4E    I visited Alfredo per follow up.  I spoke with his wife Tabby and she said that she had no needs at this time.  I do not have plans to follow up at this time.    Eleanor Tapia  Chaplain Resident  Pager [phone]    * [facility] remains available 24/7 for emergent requests/referrals, either by having the switchboard page the on-call  or by entering an ASAP/STAT consult in Epic (this will also page the on-call ). Routine Epic consults receive an initial response within 24 hours.*

## 2023-10-06 NOTE — PROGRESS NOTES
HEMODIALYSIS TREATMENT NOTE    Date: 10/6/2023  Time: 4.30 PM    Data:  Pre Wt: 89.1 kg     Desired Wt: 85.1  kg   Post Wt:  87.1 kg (Estimated)  Weight change: 2.0  kg  Ultrafiltration - Post Run Net Total Removed : 2000 mL   Vascular Access Status: CVC  patent  Dialyzer Rinse:  Streaked. Light  Total Blood Volume Processed: 72.2  L  Total Dialysis (Treatment) Time:  3.5   Dialysate Bath: K 2, Ca 3  Heparin: None    Lab:   No    Interventions:  Pt scheduled for 3.5 hours HD via right internal jugular , both lines blood back flow good but positional.  -400 with 600DFR.  Within 10 minutes of treatment initiation SBP treading down to 80's , goal down to 2.0 kg, 100 mL bolus was given with little effect, primary nurse informed & 15 mg Midodrine was given. Blood pressure rebounding, UF with minimum UF but SBP dropped to <80's, Nephrology paged & primary team aware.  Levophed 16 mg in  initiated, dose adjusted according to pt hemodynamic status.  2.0kg UF removed, Crit-line -13.5% with A profile at the end of HD run.  Pt completed his treatment time well, blood rinsed back, right internal jugular saline locked & handoff report given.      Assessment:  Pt alert & more awake at the end of HD.  Right internal jugular okay to use from report.  Monitoring every 15 minutes & PRN.     Plan:    Per Renal Team.

## 2023-10-06 NOTE — PROGRESS NOTES
SURGICAL ICU PROGRESS NOTE  10/05/2023        Date of Service (when I saw the patient): 10/05/2023    ASSESSMENT:   Alfredo Burnham is a 68 year old male who was admitted to the SICU on 9/24/2023 s/p open AAA repair. Patient has a history of HTN and previous TIA. He presented to the ED on 9/24 with right sided abdominal pain and was found to have a contained, ruptured AAA on CT. 30 min super-celiac clamp time. Prolonged intra-renal clamp. 9/25 s/p flex sig showing rectal ischemia, abdominal washout showing a hemostatic AAA graft and no evidence of transmural colonic or small bowel ischemia, and an unsuccessful LLE embolectomy. 9/26 s/p repeat abdominal washout, retroperitoneal closure, LLE wound washout. 9/29 s/p repeat abd washout, bowel appeared well perfused w/o notable ischemia. OR 10/2 for abdominal fascia closure. Extubated 10/4.     CHANGES and MAJOR THINGS TODAY:   - Stop thiamine, PPI  - Switch RIJ MAC to dialysis line  - iHD today, if tolerates could discuss transfer to [facility] [place]    PLAN:    Neurological:  # Acute pain   - Multi-modal pain control (oxycodone PRN, dilaudid PRN, scheduled tylenol, robaxin PRN)   - RAPS consult for block pre-op for amputation on LLE    # Delirium, CAM-ICU score=3 (delirium present)  # Mixed metabolic encephalopathy   - Monitor neurological status. Delirium preventions and precautions  - Melatonin q6pm and seroquel for sleep aid  - Atarax PRN for anxiety     # Sedation, RASS goal 0 to -1  - Precedex gtt discontinued    Pulmonary:  # Acute hypoxic respiratory failure  # s/p extubation 10/3  - Nasal cannula @ 1 LPM; continue to wean down as able   - Head of bed elevation     Cardiovascular:    # Contained AAA rupture s/p open repair  # Suspected emboli to LLE  # Hx of HTN  # Sinus tachycardia  # Ischemic LLE  - Continue to monitor hemodynamic status.   - Goal MAP >65, SBP <160  - L AKA planned for early next week    GI/Nutrition:    # s/p open AAA repair, abthera device in  place  # Post-operative melena, resolved  # Rectal ischemia, concern for, resolved  - Abdomen closed  - NJ feeds at goal  - Continue bowel regimen  - Ok for ice chips by mouth    Renal/ Fluids/Electrolytes:  # MAYA  # Oliguria  - Low urine output (10 mL on 10/5)  - Dialysis management per nephrology  - Daily straight cath    Endocrine:  # Stress hyperglycemia  - Q6 BG checks, continue high dose sliding scale insulin.   - -180 over last 24h, no changes to regimen needed    Infectious disease:   Afebrile. White count downtrending, 12.9 today  S/p cefepime, vancomycin, metronidazole, and voriconazole for positive BAL (aspergillus and candida) and fever of unknown origin (ultimately believed to be side effect of precedex)  - No indication for antibiotics    Hematology:    # Acute blood loss anemia  # Thrombocytopenia, multifactorial  # LLE ischemia/ DVT  Platelet count recovering s/p 4-day course of steroids. Low risk of HIT per 4T score.  - Continue subcutaneous heparin  - Hydrocortisone taper completed 10/5    Musculoskeletal:  # Weakness and deconditioning of critical illness  # Left lower limb ischemia   - Passive ROM at bedside with RN  - Likely will return to OR with vascular 10/10 for LLE amputation; will have nerve block prior to surgery  - Physical therapy consult   - Podiatry consult for nail clipping     Dermatology:  - WOC consulted for penile pressure injury    General Cares/Prophylaxis:    DVT Prophylaxis: Heparin SQ and Pneumatic Compression Devices  GI Prophylaxis: None  Restraints: Restraints for medical healing needed: YES    Lines/ tubes/ drains:  - PIV x3  - RIJ Dialysis line  - R radial arterial line  - VAC on abdomen and L leg    Disposition:  - Surgical ICU. Transfer to [facility] if tolerating iHD    Patient seen, findings and plan discussed with surgical ICU staff, Dr. Narvaez.      Lucas Niño MD  Surgical ICU    ====================================  INTERVAL HISTORY:   No acute events  overnight. No pressors needed overnight. Slept comfortably. Following commands but not talking.    ROS unable to be performed due to sedation and delirium.    OBJECTIVE:   1. VITAL SIGNS:   Temp:  [98.6  F (37  C)-99.9  F (37.7  C)] 99.1  F (37.3  C)  Pulse:  [] 95  Resp:  [7-25] 16  BP: ()/(51-79) 103/69  MAP:  [56 mmHg-89 mmHg] 75 mmHg  Arterial Line BP: ()/(44-67) 114/59  FiO2 (%):  [50 %] 50 %  SpO2:  [89 %-100 %] 96 %  FiO2 (%): 50 %  Resp: 16      2. INTAKE/ OUTPUT:   I/O last 3 completed shifts:  In: 1430.04 [I.V.:475.04; NG/GT:305]  Out: 2163.6 [Urine:10; Other:2153.6]    3. PHYSICAL EXAMINATION:  General: Nasal cannula, lightly sedated. Can squeeze fingers on command and answer simple yes/no questions. Speech difficult to understand.  HEENT: Normocephalic, atraumatic. RIJ dialysis line  Neuro: Moving all extremities spontaneously  Pulm/Resp: Clear lung fields  CV: Regular rhythm, occasionally mildly tachycardic   Abdomen: Fascia closed with VAC in place, serosanguinous output, soft, mildly distended, soft  :  Significant scrotal and penile swelling.   MSK/Extremities: RLE warm to palpation with multiphasic DP, edematous. LLE cool to touch, dusky, no signals distal of popliteal. VAC on LLE fasciotomy sites    4. INVESTIGATIONS:   Arterial Blood Gases   Recent Labs   Lab 10/03/23  2043 10/01/23  0348 09/30/23  1547 09/30/23  0404   PH 7.44 7.42 7.42 7.35   PCO2 31* 34* 29* 35   PO2 61* 110* 198* 72*   HCO3 21 22 19* 19*     Complete Blood Count   Recent Labs   Lab 10/04/23  1241 10/04/23  0334 10/03/23  1953/23  1208   WBC 14.4* 16.7* 16.2* 13.9*   HGB 7.6* 7.6* 7.6* 7.5*   PLT 75* 75* 66* 57*     Basic Metabolic Panel  Recent Labs   Lab 10/05/23  0356 10/05/23  0349 10/04/23  2338 10/04/23  1937 10/04/23  1244 10/04/23  1241 10/04/23  0822 10/04/23  0334 10/03/23  2351 10/03/23  1953   NA  --  139  139  --   --   --  138  --  138  138  --  138   POTASSIUM  --  4.9  4.9  --   --    --  4.8  --  4.6  4.6  --  4.2   CHLORIDE  --  105  105  --   --   --  105  --  106  106  --  105   CO2  --  18*  18*  --   --   --  21*  --  19*  19*  --  18*   BUN  --  91.5*  91.5*  --   --   --  55.2*  --  63.4*  63.4*  --  66.6*   CR  --  2.84*  2.84*  --   --   --  1.83*  --  1.93*  1.93*  --  1.94*   * 184*  184* 167* 132*   < > 114*   < > 122*  122*   < > 201*  234*    < > = values in this interval not displayed.     Liver Function Tests  Recent Labs   Lab 10/05/23  0349 10/04/23  1241 10/04/23  0334 10/03/23  1953/23  1208 10/03/23  0343 10/02/23  1435 10/02/23  0305 10/01/23  1155 10/01/23  0344 09/30/23  1931 09/30/23  1003 09/30/23  0403   AST 54*  --  80*  --   --  64*  --  66*  --  88*  --  125* 133*   ALT 31  --  30  --   --  21  --  25  --  26  --  30 37   ALKPHOS 252*  --  234*  --   --  142*  --  127  --  103  --  75 75   BILITOTAL 0.6  --  0.9  --   --  1.1  --  1.8*  --  1.6*  --  1.4* 1.4*   ALBUMIN 2.6* 2.7* 2.7*  2.7* 2.5*   < > 2.6*  2.6*   < > 2.5*  2.5*   < > 2.0*  2.0*   < > 2.1* 2.2*   INR  --   --   --   --   --   --   --  1.30*  --  1.28*  --  1.36* 1.35*    < > = values in this interval not displayed.     Pancreatic Enzymes  No lab results found in last 7 days.  Coagulation Profile  Recent Labs   Lab 10/02/23  0305 10/01/23  0344 09/30/23  1003 09/30/23  0403 09/30/23  0048 09/29/23  1938   INR 1.30* 1.28* 1.36* 1.35* 1.31* 1.37*   PTT  --   --  39* 37 38 37         5. RADIOLOGY:   Recent Results (from the past 24 hour(s))   XR Abdomen Port 1 View    Narrative    EXAMINATION:  XR ABDOMEN PORT 1 VIEW 10/4/2023 10:59 AM     COMPARISON: none..    HISTORY: Verify small bowel feeding tube bedside placement.    TECHNIQUE: Frontal view of the abdomen.    FINDINGS: Feeding tube terminates in the distal duodenum. No  abnormally dilated loops of bowel. No pneumatosis or portal venous  gas.       Impression    IMPRESSION: Feeding tube terminates in the distal  duodenum.     ABIOLA NAPOLES MD         SYSTEM ID:  [id number]

## 2023-10-06 NOTE — PROGRESS NOTES
STAFF NOTE:  New subclavian and pulled femoral dialysis line yesterday  No response to lasix    anasarca about the same.   Abdominal wound site looks fine  Vascular access sites look fine; the right internal jugular introducer site is fine, the femoral dialysis line site still looks ok; arterial lines are ok  LLE is mottled, more demarcated.  I still don't see obvious signs of concurrent infection.  There are some blistering on the thigh that is healing ok and appears well-perfused.    a wound vac on the calf has normal output    Labs look okish even off dialysis for a day.  Potassium only 5.1, acidosis slightly worse with bicarb of 16, but the biggest surprise is that BUN jumped to 130  Phosphorus climbing  Transaminases have both finally normalized  No lactic acidosis  Leukocytosis improving, and platelet count has now fully recovered    This is a 68M hx HTN, TIA, s/p open contained AAA rupture s/p repair with prolonged suprarenal and juxtarenal clamp.  Also had an unsuccessfull LLE embolectomy without perfusion to the MARY JANE from the popliteal downwand.      He did not demonstrate any response to lasix yesterday, so we will place a new temporary dialysis line in the right neck (nephrology still holds some hope for renal recovery, I believe, so hasn't gotten a tunneled line) and get IHD today.  If he tolerates IHD without hypotension, he can transfer to the floor.  Will also consider starting metoprolol after seeing how he does with IHD.  We can continue to wean steroids.      The current plan for amputation is Tuesday.  The Acute Pain service will likely place an epidural on Monday or so in anticipation of this.    METABOLIC ENCEPHALOPATHY / DELIRIUM:  -melatonin + seroquel for sleep   -has needed restraints to keep from pulling at lines    ACUTE HYPOXIC RESPIRATORY FAILURE  -resolved with aggressive volume removal with CRRT.    -continue to wean steroids today.    ISCHEMIC LLE:  -s/p fasciotomy.  Starting to  demarcate / become gangrenous now, although I don't see definite signs of superimposed infection.  I anticipate above knee amputation on Tuesday.    -gabapentin 100tid for neuropathic pain  -Acute Pain Service reported they would be willing to offer epidural prior to amputation, and probably would place the day before surgery, so will plan on holding heparin on Monday    THROMBOCYTOPENIA:  -resolving.  Discontinue steroids.    ABDOMINAL AORTIC RUPTURE:  -s/p open repair  -abdomen closed  -IV & enteral narcotics available for analgesia.      SEVERE PROTEIN-CALORIE MALNUTRITION:  -post-pyloric tube feeds are Pivot 1.5 at 50ml/h as goal   -still too delirious for adequate nutrition by mouth, but will get swallow study today  -thiamine can stop today    ACUTE KIDNEY FAILURE:  -contributing factors are massive hemorrhage, prolonged perirenal aortic clamp, possible ongoing infection / inflammation from the gangrenous leg  -place RIJ dialysis line today; plan IHD with a few liters of ultrafiltration probably - will discuss with nephrology    MISC:  -Code status is full code  -family updated at bedside  -SQH rather than lovenox given renal dysfunction; SQH will need to be held on Monday before epidural; PPI can stop now that steroids down  -lines: RIJ MAC introducer will be exchanged for a dialysis line.    -aparicio not necessary given anuria.  Intermittent straight caths are ok  -anticipate discharge to transitional care unit in >1 week, and ultimate discharge to skilled nursing.  Amputation will be Tuesday; possible to floor after IHD    Billing statement: 35min of E&M time.    SHAMEKA Narvaez MD  Clinical   Anesthesia / Critical Care  *[phone]

## 2023-10-06 NOTE — PROCEDURES
[facility]    Central line    Date/Time: 10/6/2023 10:47 AM    Performed by: Clint Braga MD  Authorized by: Nestor Narvaez MD  Indications: vascular access      UNIVERSAL PROTOCOL   Site Marked: Yes  Prior Images Obtained and Reviewed:  Yes  Required items: Required blood products, implants, devices and special equipment available    Patient identity confirmed:  Arm band and hospital-assigned identification number  NA - No sedation, light sedation, or local anesthesia  Confirmation Checklist:  Patient's identity using two indicators, relevant allergies, procedure was appropriate and matched the consent or emergent situation and correct equipment/implants were available  Time out: Immediately prior to the procedure a time out was called    Universal Protocol: the Joint Commission Universal Protocol was followed    Preparation: Patient was prepped and draped in usual sterile fashion      SEDATION    Patient Sedated: No      Preparation: skin prepped with povidone-iodine  Skin prep agent dried: skin prep agent completely dried prior to procedure  Sterile barriers: all five maximum sterile barriers used - cap, mask, sterile gown, sterile gloves, and large sterile sheet  Hand hygiene: hand hygiene performed prior to central venous catheter insertion  Patient position: reverse Trendelenburg  Catheter type: double lumen (double lumen dialysis catheter)  Catheter size: 11.5 Fr.  Pre-procedure: landmarks identified  Number of attempts: 1  Successful placement: yes  Post-procedure: line sutured and dressing applied  Assessment: blood return through all ports      PROCEDURE  Describe Procedure: A MAC introducer was rewired to place a subclavian double lumen dialysis catheter. The site around the existing introducer was prepped with standard sterile technique with iodine, and the area was subsequently draped. The access port for the introducer was removed carefully. A  guidewire was advanced through the access port. The EKG monitor did not show any evidence of arrhythmia. The existing line was carefully removed with close attention to securing the proximal and distal catheter. A dialysis cathter was then rewired, and then advanced such that the hub of the catheter was at the site of entry. The guidewire was then removed. All lines were appropriately flushed and sincere blood. The line was secured at the skin. There was some bleeding from the entry site. After holding pressure for 5 minutes, a single suture was tied at the entry site into the skin, which provided adequate hemostasis. The site was then dressed, with the excellent assistance of bedside nursing. A post-placement CXR was ordered.   Patient Tolerance:  Patient tolerated the procedure well with no immediate complications  Length of time physician/provider present for 1:1 monitoring during sedation: 20      Plan:  - pending CXR.    Clint Braga MD - Resident   Moses Jackson MD - Fellow    Dr. Nestor Narvaez (attending) was present for the full duration of the procedure and immediately available.    Clint Braga MD  SICU, pager [phone]

## 2023-10-06 NOTE — PROGRESS NOTES
Nephrology Progress Note  10/06/2023       Alfredo Burnham is a 68 yom with complex hx of TIA, HTN, blindness who presented to [facility] 9/24 with severe abdominal pain radiating to flank and back, CT revealed AAA with a contained rupture.  Taken to OR for aortobiiliac bypass and resection of ruptured pararenal aneurysm.   Post op course complicated by hypotension requiring pressors and metabolic acidosis.  Baseline Cr 1.0 on presentation but on the rise to >5 at time of renal consult for MAYA management and possible RRT.       Interval History :   Mr Burnham had CRRT stopped yesterday, labs reasonable this am although bicarb is a bit low at 18, K 4.9.  Can remove femoral HD line, anticipate running HD tomorrow with plan to re-wire RIJ to temp HD line to allow some time before committing to tunneled line.  Anticipate renal recovery eventually as BP's have been stable but still does need R leg amputation still.      Assessment & Recommendations:   MAYA-Baseline Cr 1.0, ordered UA.  CT showed benign cyst but otherwise normal kidneys.  Cr on the rise since surgery, did receive contrast for CTA.  Urine microscopy showed granular casts suggesting ATN which will recover with time and stabilizing hemodynamics.  Started on CRRT 9/30 for volume and clearance with minimal UOP and rising Cr.                  -Started CRRT 9/30 for volume and clearance, stopped 10/4 and now running iHD PRN and watching for recovery.     -HD today, 3.5h/3L of UF.        -Team is re-wiring RIJ central line to HD line today                 -Dialysis consent signed and scanned into media tab.      Volume-Total body volume overloaded but much of it is likely 3rd spaced with low albumin and acute illness.  Net negative with CRRT stopping mid-afternoon.  Plan for HD tomorrow, will      Electrolytes-K 5.6, bicarb 15, Na 138.  Running HD.       BMD-Ca 8.0, Mg and Phos mildly up consistent with poor clearance.       Anemia-Hgb 7.8 and stable, ~14 on  "presentation, acute management per team.       Nutrition-On TF      Time spent: 40 minutes on this date of encounter for chart review, physical exam, medical decision making and co-ordination of care.      Seen and discussed with Dr Oconnor     Recommendations were communicated to primary team via verbal communication.        ALTAGRACIA Jiménez CNS  Clinical Nurse Specialist  [phone]    Review of Systems:   I reviewed the following systems:  ROS not done due to lethargy    Physical Exam:   I/O last 3 completed shifts:  In: 2369.6 [I.V.:459.6; NG/GT:590]  Out: 10 [Urine:10]   BP (!) 121/93   Pulse 115   Temp 99.3  F (37.4  C) (Axillary)   Resp 14   Ht 1.727 m (5' 8\")   Wt 89.1 kg (196 lb 6.9 oz)   SpO2 96%   BMI 29.87 kg/m       GENERAL APPEARANCE: Extubated, lethargic, in no distress.   EYES: No scleral icterus  Pulmonary: On vent 40%/8 of PEEP  CV: Regular rhythm, normal rate   - Edema +2-3 generalized, ++ scrotal edema.    GI: distended, nontender  MS: no evidence of inflammation in joints, no muscle tenderness  : No Lu  SKIN: no rash, warm, dry  NEURO: No focal deficits.         Labs:   All labs reviewed by me  Electrolytes/Renal -   Recent Labs   Lab Test 10/06/23  1116 10/06/23  1115 10/06/23  0753 10/06/23  0337 10/06/23  0330 10/05/23  1913 10/05/23  1912   NA  --  138  --   --  139  139  --  139   POTASSIUM  --  5.6*  --   --  5.1  5.1  --  5.1   CHLORIDE  --  103  --   --  106  106  --  106   CO2  --  15*  --   --  16*  16*  --  17*   BUN  --  136.0*  --   --  131.0*  131.0*  --  120.0*   CR  --  4.65*  --   --  4.15*  4.15*  --  3.80*   * 178* 122*   < > 129*  129*   < > 165*   OMAR  --  8.0*  --   --  7.9*  7.9*  --  7.8*   MAG  --  2.6*  --   --  2.6*  --  2.7*   PHOS  --  7.1*  --   --  7.0*  --  6.9*    < > = values in this interval not displayed.         CBC -   Recent Labs   Lab Test 10/06/23  0330 10/05/23  1248 10/05/23  0819   WBC 12.9* 13.5* 12.2*   HGB 7.8* 7.9* " 6.9*   * 95* 94*         LFTs -   Recent Labs   Lab Test 10/06/23  0330 10/05/23  0349 10/04/23  1241 10/04/23  0334   ALKPHOS 253* 252*  --  234*   BILITOTAL 0.4 0.6  --  0.9   ALT 28 31  --  30   AST 40 54*  --  80*   PROTTOTAL 4.9* 5.0*  --  5.3*   ALBUMIN 2.4* 2.6* 2.7* 2.7*  2.7*         Iron Panel - No lab results found.        Current Medications:   acetaminophen  975 mg Oral or Feeding Tube Q6H    B and C vitamin Complex with folic acid  10 mL Per Feeding Tube Daily    gabapentin  100 mg Oral Q8H COLUMBA    heparin ANTICOAGULANT  5,000 Units Subcutaneous Q8H    insulin aspart  1-12 Units Subcutaneous Q4H    melatonin  5 mg Oral or Feeding Tube QPM    - MEDICATION INSTRUCTIONS -   Does not apply Once    polyethylene glycol  17 g Oral or Feeding Tube Daily    protein modular  1 packet Per Feeding Tube TID    QUEtiapine  25 mg Oral or Feeding Tube At Bedtime    senna-docusate  1 tablet Oral or Feeding Tube BID    sodium chloride (PF)  3 mL Intracatheter Q8H    sodium chloride (PF)  9 mL Intracatheter During Dialysis/CRRT (from stock)    sodium chloride (PF)  9 mL Intracatheter During Dialysis/CRRT (from stock)    sodium chloride 0.9%  250 mL Intravenous Once in dialysis/CRRT    sodium chloride 0.9%  300 mL Hemodialysis Machine Once      dextrose      dextrose      norepinephrine

## 2023-10-07 ENCOUNTER — APPOINTMENT (OUTPATIENT)
Dept: PHYSICAL THERAPY | Facility: CLINIC | Age: 68
DRG: 268 | End: 2023-10-07
Payer: COMMERCIAL

## 2023-10-07 ENCOUNTER — APPOINTMENT (OUTPATIENT)
Dept: MRI IMAGING | Facility: CLINIC | Age: 68
DRG: 268 | End: 2023-10-07
Attending: NURSE PRACTITIONER
Payer: COMMERCIAL

## 2023-10-07 ENCOUNTER — APPOINTMENT (OUTPATIENT)
Dept: CT IMAGING | Facility: CLINIC | Age: 68
DRG: 268 | End: 2023-10-07
Payer: COMMERCIAL

## 2023-10-07 ENCOUNTER — APPOINTMENT (OUTPATIENT)
Dept: ULTRASOUND IMAGING | Facility: CLINIC | Age: 68
DRG: 268 | End: 2023-10-07
Payer: COMMERCIAL

## 2023-10-07 LAB
ABO/RH(D): NORMAL
ALBUMIN SERPL BCG-MCNC: 2.5 G/DL (ref 3.5–5.2)
ALP SERPL-CCNC: 203 U/L (ref 40–129)
ALT SERPL W P-5'-P-CCNC: 22 U/L (ref 0–70)
ANION GAP SERPL CALCULATED.3IONS-SCNC: 17 MMOL/L (ref 7–15)
ANTIBODY SCREEN: NEGATIVE
AST SERPL W P-5'-P-CCNC: 34 U/L (ref 0–45)
BILIRUB DIRECT SERPL-MCNC: 0.3 MG/DL (ref 0–0.3)
BILIRUB SERPL-MCNC: 0.5 MG/DL
BUN SERPL-MCNC: 91.5 MG/DL (ref 8–23)
CA-I BLD-MCNC: 4.2 MG/DL (ref 4.4–5.2)
CALCIUM SERPL-MCNC: 8 MG/DL (ref 8.8–10.2)
CHLORIDE SERPL-SCNC: 97 MMOL/L (ref 98–107)
CHOLEST SERPL-MCNC: 87 MG/DL
CREAT SERPL-MCNC: 3.51 MG/DL (ref 0.67–1.17)
DEPRECATED HCO3 PLAS-SCNC: 23 MMOL/L (ref 22–29)
EGFRCR SERPLBLD CKD-EPI 2021: 18 ML/MIN/1.73M2
ERYTHROCYTE [DISTWIDTH] IN BLOOD BY AUTOMATED COUNT: 19.1 % (ref 10–15)
ERYTHROCYTE [DISTWIDTH] IN BLOOD BY AUTOMATED COUNT: 19.3 % (ref 10–15)
GLUCOSE BLDC GLUCOMTR-MCNC: 129 MG/DL (ref 70–99)
GLUCOSE BLDC GLUCOMTR-MCNC: 129 MG/DL (ref 70–99)
GLUCOSE BLDC GLUCOMTR-MCNC: 130 MG/DL (ref 70–99)
GLUCOSE BLDC GLUCOMTR-MCNC: 135 MG/DL (ref 70–99)
GLUCOSE BLDC GLUCOMTR-MCNC: 140 MG/DL (ref 70–99)
GLUCOSE BLDC GLUCOMTR-MCNC: 144 MG/DL (ref 70–99)
GLUCOSE SERPL-MCNC: 145 MG/DL (ref 70–99)
HCT VFR BLD AUTO: 23 % (ref 40–53)
HCT VFR BLD AUTO: 23.1 % (ref 40–53)
HDLC SERPL-MCNC: 23 MG/DL
HGB BLD-MCNC: 7.7 G/DL (ref 13.3–17.7)
HGB BLD-MCNC: 7.8 G/DL (ref 13.3–17.7)
LACTATE SERPL-SCNC: 2 MMOL/L (ref 0.7–2)
LDLC SERPL CALC-MCNC: 32 MG/DL
MAGNESIUM SERPL-MCNC: 2.1 MG/DL (ref 1.7–2.3)
MCH RBC QN AUTO: 32.5 PG (ref 26.5–33)
MCH RBC QN AUTO: 32.8 PG (ref 26.5–33)
MCHC RBC AUTO-ENTMCNC: 33.3 G/DL (ref 31.5–36.5)
MCHC RBC AUTO-ENTMCNC: 33.9 G/DL (ref 31.5–36.5)
MCV RBC AUTO: 96 FL (ref 78–100)
MCV RBC AUTO: 98 FL (ref 78–100)
NONHDLC SERPL-MCNC: 64 MG/DL
PHOSPHATE SERPL-MCNC: 5.6 MG/DL (ref 2.5–4.5)
PLATELET # BLD AUTO: 119 10E3/UL (ref 150–450)
PLATELET # BLD AUTO: 133 10E3/UL (ref 150–450)
POTASSIUM SERPL-SCNC: 4.2 MMOL/L (ref 3.4–5.3)
PROT SERPL-MCNC: 4.9 G/DL (ref 6.4–8.3)
RADIOLOGIST FLAGS: ABNORMAL
RBC # BLD AUTO: 2.35 10E6/UL (ref 4.4–5.9)
RBC # BLD AUTO: 2.4 10E6/UL (ref 4.4–5.9)
SODIUM SERPL-SCNC: 137 MMOL/L (ref 135–145)
SPECIMEN EXPIRATION DATE: NORMAL
TRIGL SERPL-MCNC: 160 MG/DL
TROPONIN T SERPL HS-MCNC: 124 NG/L
UFH PPP CHRO-ACNC: 0.18 IU/ML
UFH PPP CHRO-ACNC: <0.1 IU/ML
WBC # BLD AUTO: 13.2 10E3/UL (ref 4–11)
WBC # BLD AUTO: 13.9 10E3/UL (ref 4–11)

## 2023-10-07 PROCEDURE — 85520 HEPARIN ASSAY: CPT | Performed by: SURGERY

## 2023-10-07 PROCEDURE — 86923 COMPATIBILITY TEST ELECTRIC: CPT

## 2023-10-07 PROCEDURE — 83735 ASSAY OF MAGNESIUM: CPT | Performed by: PHYSICIAN ASSISTANT

## 2023-10-07 PROCEDURE — 250N000013 HC RX MED GY IP 250 OP 250 PS 637: Performed by: ANESTHESIOLOGY

## 2023-10-07 PROCEDURE — 93970 EXTREMITY STUDY: CPT | Mod: 26 | Performed by: RADIOLOGY

## 2023-10-07 PROCEDURE — 97162 PT EVAL MOD COMPLEX 30 MIN: CPT | Mod: GP | Performed by: PHYSICAL THERAPIST

## 2023-10-07 PROCEDURE — 83605 ASSAY OF LACTIC ACID: CPT | Performed by: PHYSICIAN ASSISTANT

## 2023-10-07 PROCEDURE — 250N000013 HC RX MED GY IP 250 OP 250 PS 637

## 2023-10-07 PROCEDURE — 86900 BLOOD TYPING SEROLOGIC ABO: CPT

## 2023-10-07 PROCEDURE — 99291 CRITICAL CARE FIRST HOUR: CPT | Mod: GC | Performed by: ANESTHESIOLOGY

## 2023-10-07 PROCEDURE — 97530 THERAPEUTIC ACTIVITIES: CPT | Mod: GP | Performed by: PHYSICAL THERAPIST

## 2023-10-07 PROCEDURE — 82330 ASSAY OF CALCIUM: CPT | Performed by: PHYSICIAN ASSISTANT

## 2023-10-07 PROCEDURE — 84100 ASSAY OF PHOSPHORUS: CPT | Performed by: PHYSICIAN ASSISTANT

## 2023-10-07 PROCEDURE — 84484 ASSAY OF TROPONIN QUANT: CPT | Performed by: SURGERY

## 2023-10-07 PROCEDURE — 70450 CT HEAD/BRAIN W/O DYE: CPT

## 2023-10-07 PROCEDURE — 250N000011 HC RX IP 250 OP 636: Mod: JZ

## 2023-10-07 PROCEDURE — 99233 SBSQ HOSP IP/OBS HIGH 50: CPT | Performed by: INTERNAL MEDICINE

## 2023-10-07 PROCEDURE — 86923 COMPATIBILITY TEST ELECTRIC: CPT | Performed by: PHARMACIST

## 2023-10-07 PROCEDURE — 80061 LIPID PANEL: CPT | Performed by: SURGERY

## 2023-10-07 PROCEDURE — 70450 CT HEAD/BRAIN W/O DYE: CPT | Mod: 26 | Performed by: RADIOLOGY

## 2023-10-07 PROCEDURE — 80076 HEPATIC FUNCTION PANEL: CPT

## 2023-10-07 PROCEDURE — 99233 SBSQ HOSP IP/OBS HIGH 50: CPT | Mod: FS | Performed by: NURSE PRACTITIONER

## 2023-10-07 PROCEDURE — 250N000009 HC RX 250

## 2023-10-07 PROCEDURE — 93970 EXTREMITY STUDY: CPT

## 2023-10-07 PROCEDURE — 70551 MRI BRAIN STEM W/O DYE: CPT | Mod: 26 | Performed by: RADIOLOGY

## 2023-10-07 PROCEDURE — 70551 MRI BRAIN STEM W/O DYE: CPT

## 2023-10-07 PROCEDURE — 85027 COMPLETE CBC AUTOMATED: CPT

## 2023-10-07 PROCEDURE — 250N000013 HC RX MED GY IP 250 OP 250 PS 637: Performed by: SURGERY

## 2023-10-07 PROCEDURE — 200N000002 HC R&B ICU UMMC

## 2023-10-07 RX ORDER — NOREPINEPHRINE BITARTRATE 0.06 MG/ML
.01-.6 INJECTION, SOLUTION INTRAVENOUS CONTINUOUS
Status: DISCONTINUED | OUTPATIENT
Start: 2023-10-08 | End: 2023-10-11

## 2023-10-07 RX ORDER — HEPARIN SODIUM 10000 [USP'U]/100ML
0-5000 INJECTION, SOLUTION INTRAVENOUS CONTINUOUS
Status: DISCONTINUED | OUTPATIENT
Start: 2023-10-07 | End: 2023-10-07

## 2023-10-07 RX ORDER — LORAZEPAM 2 MG/ML
1 INJECTION INTRAMUSCULAR
Status: DISCONTINUED | OUTPATIENT
Start: 2023-10-07 | End: 2023-10-13

## 2023-10-07 RX ORDER — ATORVASTATIN CALCIUM 40 MG/1
40 TABLET, FILM COATED ORAL EVERY EVENING
Status: DISCONTINUED | OUTPATIENT
Start: 2023-10-07 | End: 2023-10-08

## 2023-10-07 RX ORDER — GABAPENTIN 250 MG/5ML
300 SOLUTION ORAL EVERY 8 HOURS SCHEDULED
Status: DISCONTINUED | OUTPATIENT
Start: 2023-10-07 | End: 2023-10-07

## 2023-10-07 RX ORDER — GABAPENTIN 250 MG/5ML
300 SOLUTION ORAL AT BEDTIME
Status: DISCONTINUED | OUTPATIENT
Start: 2023-10-07 | End: 2023-10-08

## 2023-10-07 RX ORDER — HEPARIN SODIUM 10000 [USP'U]/100ML
0-5000 INJECTION, SOLUTION INTRAVENOUS CONTINUOUS
Status: DISCONTINUED | OUTPATIENT
Start: 2023-10-07 | End: 2023-10-08

## 2023-10-07 RX ORDER — MIDODRINE HYDROCHLORIDE 5 MG/1
20 TABLET ORAL DAILY PRN
Status: DISCONTINUED | OUTPATIENT
Start: 2023-10-07 | End: 2023-11-03

## 2023-10-07 RX ADMIN — NYSTATIN 500000 UNITS: 100000 SUSPENSION ORAL at 09:07

## 2023-10-07 RX ADMIN — GABAPENTIN 100 MG: 250 SUSPENSION ORAL at 05:07

## 2023-10-07 RX ADMIN — HEPARIN SODIUM 1450 UNITS/HR: 10000 INJECTION, SOLUTION INTRAVENOUS at 18:34

## 2023-10-07 RX ADMIN — HEPARIN SODIUM 1050 UNITS/HR: 10000 INJECTION, SOLUTION INTRAVENOUS at 09:04

## 2023-10-07 RX ADMIN — HEPARIN SODIUM 1050 UNITS/HR: 10000 INJECTION, SOLUTION INTRAVENOUS at 02:25

## 2023-10-07 RX ADMIN — Medication 12.5 MG: at 09:07

## 2023-10-07 RX ADMIN — ACETAMINOPHEN 975 MG: 325 TABLET, FILM COATED ORAL at 03:55

## 2023-10-07 RX ADMIN — ACETAMINOPHEN 975 MG: 325 TABLET, FILM COATED ORAL at 16:00

## 2023-10-07 RX ADMIN — QUETIAPINE FUMARATE 25 MG: 25 TABLET ORAL at 22:32

## 2023-10-07 RX ADMIN — INSULIN ASPART 1 UNITS: 100 INJECTION, SOLUTION INTRAVENOUS; SUBCUTANEOUS at 23:49

## 2023-10-07 RX ADMIN — INSULIN ASPART 1 UNITS: 100 INJECTION, SOLUTION INTRAVENOUS; SUBCUTANEOUS at 03:40

## 2023-10-07 RX ADMIN — NYSTATIN 500000 UNITS: 100000 SUSPENSION ORAL at 12:04

## 2023-10-07 RX ADMIN — NYSTATIN 500000 UNITS: 100000 SUSPENSION ORAL at 16:00

## 2023-10-07 RX ADMIN — NOREPINEPHRINE BITARTRATE 0.03 MCG/KG/MIN: 0.06 INJECTION, SOLUTION INTRAVENOUS at 23:34

## 2023-10-07 RX ADMIN — Medication 5 MG: at 19:57

## 2023-10-07 RX ADMIN — ACETAMINOPHEN 975 MG: 325 TABLET, FILM COATED ORAL at 09:08

## 2023-10-07 RX ADMIN — Medication 10 ML: at 09:10

## 2023-10-07 RX ADMIN — GABAPENTIN 300 MG: 250 SUSPENSION ORAL at 22:33

## 2023-10-07 RX ADMIN — NYSTATIN 500000 UNITS: 100000 SUSPENSION ORAL at 19:57

## 2023-10-07 RX ADMIN — ATORVASTATIN CALCIUM 40 MG: 40 TABLET, FILM COATED ORAL at 19:58

## 2023-10-07 ASSESSMENT — ACTIVITIES OF DAILY LIVING (ADL)
ADLS_ACUITY_SCORE: 47

## 2023-10-07 NOTE — PROGRESS NOTES
SURGICAL ICU PROGRESS NOTE  10/07/2023        Date of Service (when I saw the patient): 10/07/2023    ASSESSMENT:   Alfredo Burnham is a 68 year old male who was admitted to the SICU on 9/24/2023 s/p open AAA repair. Patient has a history of HTN and previous TIA. He presented to the ED on 9/24 with right sided abdominal pain and was found to have a contained, ruptured AAA on CT. 30 min super-celiac clamp time. Prolonged intra-renal clamp. 9/25 s/p flex sig showing rectal ischemia, abdominal washout showing a hemostatic AAA graft and no evidence of transmural colonic or small bowel ischemia, and an unsuccessful LLE embolectomy. 9/26 s/p repeat abdominal washout, retroperitoneal closure, LLE wound washout. 9/29 s/p repeat abd washout, bowel appeared well perfused w/o notable ischemia. OR 10/2 for abdominal fascia closure. Extubated 10/4.     CHANGES and MAJOR THINGS TODAY:   -MR Brain w/o contrast  -iHD tomorrow per nephrology  -Continue high dose heparin   -Consider ADDIS on Monday  -CT CAP tomorrow  -Starting atorvastatin, metoprolol  -Venous duplex    PLAN:    Neurological:  # Acute pain  #Facial droop, left lacunar infarct   - Multi-modal pain control (oxycodone PRN, dilaudid PRN, scheduled tylenol, robaxin PRN)   - RAPS consult for block pre-op for amputation on LLE  -MR brain to assess for infarcts  -ADDIS Monday to assess for source of emboli   - Will need to hold tube feeds prior to ADDIS    # Delirium, CAM-ICU score=3 (delirium present)  # Mixed metabolic encephalopathy   - Monitor neurological status. Delirium preventions and precautions  - Melatonin q6pm and seroquel for sleep aid  - Atarax PRN for anxiety     # Sedation, RASS goal 0 to -1  - Precedex gtt discontinued    Pulmonary:  # Acute hypoxic respiratory failure  # s/p extubation 10/3  - Nasal cannula @ 1 LPM; continue to wean down as able   - Head of bed elevation     Cardiovascular:    # Contained AAA rupture s/p open repair  # Suspected emboli to  LLE  # Hx of HTN  # Sinus tachycardia  # Ischemic LLE  - Continue to monitor hemodynamic status.   - Goal MAP >65, SBP <160  - L AKA planned for early next week  -Lipitor 40, metoprolol 12.5 BID    GI/Nutrition:    # s/p open AAA repair, abthera device in place  # Post-operative melena, resolved  # Rectal ischemia, concern for, resolved  - Abdomen closed  - NJ feeds at goal  - Continue bowel regimen  - Ok for ice chips by mouth    Renal/ Fluids/Electrolytes:  # MAYA  # Oliguria  - Dialysis management per nephrology  - Daily straight cath    Endocrine:  # Stress hyperglycemia  - Q6 BG checks, continue high dose sliding scale insulin.   - BG in the 140s over last 24h, no changes to regimen needed    Infectious disease:   Afebrile. White count downtrending, 12.9 today  S/p cefepime, vancomycin, metronidazole, and voriconazole for positive BAL (aspergillus and candida) and fever of unknown origin (ultimately believed to be side effect of precedex)  - Nystatin for thrush     Hematology:    # Acute blood loss anemia  # Thrombocytopenia, multifactorial  # LLE ischemia/ DVT  #PE  Platelet count recovering s/p 4-day course of steroids. Low risk of HIT per 4T score.  - High dose heparin  - Hydrocortisone taper completed 10/5  - CT CAP to assess for malignancy    Musculoskeletal:  # Weakness and deconditioning of critical illness  # Left lower limb ischemia   - Passive ROM at bedside with RN  - Likely will return to OR with vascular 10/10 for LLE amputation; planning for block prior to surgery but RAPS with eval d/t new heparin gtt  - Physical therapy consult   - Podiatry consult for nail clipping   - Venous doppler lower extremity bilaterally      General Cares/Prophylaxis:    DVT Prophylaxis: Heparin SQ and Pneumatic Compression Devices  GI Prophylaxis: None  Restraints: Restraints for medical healing needed: YES    Lines/ tubes/ drains:  - PIV x3  - RIJ Dialysis line  - R radial arterial line  - VAC on abdomen and L  leg    Disposition:  - Surgical ICU    Patient seen, findings and plan discussed with surgical ICU staff, Dr. Narvaez.      Lucas Niño MD  Surgical ICU    ====================================  INTERVAL HISTORY:   Overnight patient was known to have right sided facial drop and anisocoria. A stroke code was called and CT HEAD showed no acute brain changes but left sided likely chronic lacunar infarcts with multiple pulmonary emboli. He was placed on high intensity heparin but developed a headache. A follow-up CT head with contrast was unremarkable.     ROS unable to be performed due to sedation and delirium.    OBJECTIVE:   1. VITAL SIGNS:   Temp:  [98.8  F (37.1  C)-100.4  F (38  C)] 99.3  F (37.4  C)  Pulse:  [] 99  Resp:  [12-24] 12  BP: ()/() 129/83  MAP:  [46 mmHg-243 mmHg] 90 mmHg  Arterial Line BP: ()/(37-73) 149/64  SpO2:  [85 %-100 %] 95 %  Resp: 12      2. INTAKE/ OUTPUT:   I/O last 3 completed shifts:  In: 1431.19 [I.V.:161.19; NG/GT:570]  Out: 2775 [Emesis/NG output:50; Other:2000; Stool:725]    3. PHYSICAL EXAMINATION:  General: Nasal cannula, lightly sedated. Can squeeze fingers on command and answer simple yes/no questions. Speech difficult to understand.  HEENT: Normocephalic, atraumatic. RIJ dialysis line  Neuro: Moving all extremities spontaneously  Pulm/Resp: Clear lung fields  CV: Regular rhythm, occasionally mildly tachycardic   Abdomen: Fascia closed with VAC in place, serosanguinous output, soft, mildly distended, soft  :  Significant scrotal and penile swelling.   MSK/Extremities: RLE warm to palpation with multiphasic DP, edematous. LLE cool to touch, dusky, no signals distal of popliteal. VAC on LLE fasciotomy sites    4. INVESTIGATIONS:   Arterial Blood Gases   Recent Labs   Lab 10/03/23  2043 10/01/23  0348 09/30/23  1547   PH 7.44 7.42 7.42   PCO2 31* 34* 29*   PO2 61* 110* 198*   HCO3 21 22 19*       Complete Blood Count   Recent Labs   Lab 10/07/23  0402  10/06/23  2338 10/06/23  2155 10/06/23  0330   WBC 13.9* 16.7* 14.1* 12.9*   HGB 7.7* 8.4* 8.1* 7.8*   * 120* 115* 112*       Basic Metabolic Panel  Recent Labs   Lab 10/07/23  0428 10/07/23  0338 10/06/23  2349 10/06/23  2155 10/06/23  2007 10/06/23  1823 10/06/23  1116 10/06/23  1115     --   --  138  --  140  --  138   POTASSIUM 4.2  --   --  4.2  --  3.7  --  5.6*   CHLORIDE 97*  --   --  98  --  98  --  103   CO2 23  --   --  24  --  24  --  15*   BUN 91.5*  --   --  85.2*  --  77.2*  --  136.0*   CR 3.51*  --   --  3.15*  --  2.86*  --  4.65*   * 144* 149* 124*   < > 147*   < > 178*    < > = values in this interval not displayed.       Liver Function Tests  Recent Labs   Lab 10/07/23  0428 10/06/23  2155 10/06/23  0330 10/05/23  0349 10/04/23  1241 10/04/23  0334 10/02/23  1435 10/02/23  0305 10/01/23  1155 10/01/23  0344 09/30/23  1931 09/30/23  1003   AST 34  --  40 54*  --  80*   < > 66*  --  88*  --  125*   ALT 22  --  28 31  --  30   < > 25  --  26  --  30   ALKPHOS 203*  --  253* 252*  --  234*   < > 127  --  103  --  75   BILITOTAL 0.5  --  0.4 0.6  --  0.9   < > 1.8*  --  1.6*  --  1.4*   ALBUMIN 2.5*  --  2.4* 2.6* 2.7* 2.7*  2.7*   < > 2.5*  2.5*   < > 2.0*  2.0*   < > 2.1*   INR  --  1.19*  --   --   --   --   --  1.30*  --  1.28*  --  1.36*    < > = values in this interval not displayed.       Pancreatic Enzymes  No lab results found in last 7 days.  Coagulation Profile  Recent Labs   Lab 10/06/23  2155 10/02/23  0305 10/01/23  0344 09/30/23  1003   INR 1.19* 1.30* 1.28* 1.36*   PTT 31  --   --  39*           5. RADIOLOGY:   Recent Results (from the past 24 hour(s))   XR Chest Port 1 View    Narrative    Exam: XR CHEST PORT 1 VIEW, 10/6/2023 12:34 PM    Comparison: Chest radiograph 10/5/2023    History: line placement    Findings:  Portable AP view of the chest. Right internal jugular central venous  catheter with tip projecting over the cavoatrial junction. Left  subclavian  line with tip projecting over the right brachiocephalic  vein confluence. Enteric tube with tip collimated out of  field-of-view.     Increased diffuse mixed bilateral interstitial and airspace opacities.  Small bilateral costophrenic angle blunting. No pneumothorax. Stable  cardiomediastinal silhouette. Partially visualized upper abdomen is  unremarkable.       Impression    Impression:   1. Slight retraction of right internal jugular central venous catheter  with tip projecting over the cavoatrial junction.  2. Stable positioning of left subclavian line with tip in the right  brachiocephalic vein confluence.  3. Increased diffuse mixed airspace and interstitial opacities, which  may represent pulmonary edema versus infection with atelectasis.  4. Small bilateral pleural effusions.    I have personally reviewed the examination and initial interpretation  and I agree with the findings.    HERNANDO HERNANDEZ MD         SYSTEM ID:  [id number]   CT Head w/o Contrast   Result Value    Radiologist flags New diagnosis of pulmonary embolism (AA)    Narrative    EXAM: CT HEAD W/O CONTRAST, CTA HEAD NECK W CONTRAST  LOCATION: [facility]  DATE: 10/6/2023    INDICATION: Code Stroke to evaluate for potential thrombolysis and thrombectomy. PLEASE READ IMMEDIATELY  COMPARISON: None.  CONTRAST: iopamidol (ISOVUE 370) solution 107 mL  TECHNIQUE: Head and neck CT angiogram with IV contrast. Noncontrast head CT followed by axial helical CT images of the head and neck vessels obtained during the arterial phase of intravenous contrast administration. Axial 2D reconstructed images and   multiplanar 3D MIP reconstructed images of the head and neck vessels were performed by the technologist. Dose reduction techniques were used. All stenosis measurements made according to NASCET criteria unless otherwise specified.    FINDINGS:   NONCONTRAST HEAD CT:   INTRACRANIAL CONTENTS: No intracranial  hemorrhage, extraaxial collection, or mass effect.  No CT evidence of acute transcortical infarct. Age-indeterminate though chronic appearing left thalamic lacunar infarct.. Mild presumed chronic small vessel   ischemic changes. Mild generalized volume loss. No hydrocephalus.     VISUALIZED ORBITS/SINUSES/MASTOIDS: No intraorbital abnormality. Mild mucosal thickening scattered about the paranasal sinuses. No middle ear or mastoid effusion.    BONES/SOFT TISSUES: No acute abnormality.    HEAD CTA:  ANTERIOR CIRCULATION: No stenosis/occlusion, aneurysm, or high flow vascular malformation. Standard [place] of [place] anatomy.    POSTERIOR CIRCULATION: No stenosis/occlusion, aneurysm, or high flow vascular malformation. Balanced vertebral arteries supply a normal basilar artery.     DURAL VENOUS SINUSES: Expected enhancement of the major dural venous sinuses.    NECK CTA:  RIGHT CAROTID: No measurable stenosis or dissection.    LEFT CAROTID: No measurable stenosis or dissection.    VERTEBRAL ARTERIES: No focal stenosis or dissection. Balanced vertebral arteries.    AORTIC ARCH: Classic aortic arch anatomy with no significant stenosis at the origin of the great vessels.    NONVASCULAR STRUCTURES: Partially imaged multiple pulmonary emboli of the right upper lobe and partially imaged pulmonary embolism extending into the artery supplying the right lower lobe. There are moderate bilateral pleural effusions and multifocal   bilateral apical pulmonary consolidation.      Impression    IMPRESSION:   HEAD CT:  1.  No acute intracranial hemorrhage, extra-axial collection, or midline shift.  2.  Age-indeterminate though chronic appearing left thalamic lacunar infarct.  3.  Mild age-related changes as above.    HEAD CTA:   1.  No significant stenosis, aneurysm, or high flow vascular malformation identified.    NECK CTA:  1.  No hemodynamically significant stenosis in the vessels of the neck.  2.  Partially imaged pulmonary embolism  of the right upper and lower lobes. A dedicated CT PE study is recommended to further evaluate thrombus burden and for potential right heart strain.  3.  Moderate bilateral pleural effusions.  4.  Multifocal biapical airspace disease.        [Critical Result: New diagnosis of pulmonary embolism]    Finding was identified on 10/6/2023 9:13 PM CDT.     Dr. Herrmann was contacted by me on 10/6/2023 9:19 PM CDT and verbalized understanding of the critical result.     CTA Head Neck with Contrast   Result Value    Radiologist flags New diagnosis of pulmonary embolism (AA)    Narrative    EXAM: CT HEAD W/O CONTRAST, CTA HEAD NECK W CONTRAST  LOCATION: [facility]  DATE: 10/6/2023    INDICATION: Code Stroke to evaluate for potential thrombolysis and thrombectomy. PLEASE READ IMMEDIATELY  COMPARISON: None.  CONTRAST: iopamidol (ISOVUE 370) solution 107 mL  TECHNIQUE: Head and neck CT angiogram with IV contrast. Noncontrast head CT followed by axial helical CT images of the head and neck vessels obtained during the arterial phase of intravenous contrast administration. Axial 2D reconstructed images and   multiplanar 3D MIP reconstructed images of the head and neck vessels were performed by the technologist. Dose reduction techniques were used. All stenosis measurements made according to NASCET criteria unless otherwise specified.    FINDINGS:   NONCONTRAST HEAD CT:   INTRACRANIAL CONTENTS: No intracranial hemorrhage, extraaxial collection, or mass effect.  No CT evidence of acute transcortical infarct. Age-indeterminate though chronic appearing left thalamic lacunar infarct.. Mild presumed chronic small vessel   ischemic changes. Mild generalized volume loss. No hydrocephalus.     VISUALIZED ORBITS/SINUSES/MASTOIDS: No intraorbital abnormality. Mild mucosal thickening scattered about the paranasal sinuses. No middle ear or mastoid effusion.    BONES/SOFT TISSUES: No acute  abnormality.    HEAD CTA:  ANTERIOR CIRCULATION: No stenosis/occlusion, aneurysm, or high flow vascular malformation. Standard [place] of [place] anatomy.    POSTERIOR CIRCULATION: No stenosis/occlusion, aneurysm, or high flow vascular malformation. Balanced vertebral arteries supply a normal basilar artery.     DURAL VENOUS SINUSES: Expected enhancement of the major dural venous sinuses.    NECK CTA:  RIGHT CAROTID: No measurable stenosis or dissection.    LEFT CAROTID: No measurable stenosis or dissection.    VERTEBRAL ARTERIES: No focal stenosis or dissection. Balanced vertebral arteries.    AORTIC ARCH: Classic aortic arch anatomy with no significant stenosis at the origin of the great vessels.    NONVASCULAR STRUCTURES: Partially imaged multiple pulmonary emboli of the right upper lobe and partially imaged pulmonary embolism extending into the artery supplying the right lower lobe. There are moderate bilateral pleural effusions and multifocal   bilateral apical pulmonary consolidation.      Impression    IMPRESSION:   HEAD CT:  1.  No acute intracranial hemorrhage, extra-axial collection, or midline shift.  2.  Age-indeterminate though chronic appearing left thalamic lacunar infarct.  3.  Mild age-related changes as above.    HEAD CTA:   1.  No significant stenosis, aneurysm, or high flow vascular malformation identified.    NECK CTA:  1.  No hemodynamically significant stenosis in the vessels of the neck.  2.  Partially imaged pulmonary embolism of the right upper and lower lobes. A dedicated CT PE study is recommended to further evaluate thrombus burden and for potential right heart strain.  3.  Moderate bilateral pleural effusions.  4.  Multifocal biapical airspace disease.        [Critical Result: New diagnosis of pulmonary embolism]    Finding was identified on 10/6/2023 9:13 PM CDT.     Dr. Herrmann was contacted by me on 10/6/2023 9:19 PM CDT and verbalized understanding of the critical result.     CT Head  Perfusion w Contrast    Narrative    EXAM: CT HEAD PERFUSION W CONTRAST  LOCATION: [facility]  DATE: 10/6/2023    INDICATION: Code Stroke to evaluate for potential thrombolysis and thrombectomy. Evaluate mismatch between penumbra and core infarct. PLEASE READ IMMEDIATELY.  COMPARISON: Concurrent noncontrast head CT and CT angiogram.  TECHNIQUE: CT cerebral perfusion was performed utilizing a second contrast bolus. Perfusion data were post processed with generation of standard perfusion maps and estimation of ischemic/infarcted volumes utilizing standard threshold values. Dose   reduction techniques were used. All stenosis measurements made according to NASCET criteria unless otherwise specified.  CONTRAST: iopamidol (ISOVUE 370) solution 107 mL    FINDINGS:   CT PERFUSION:  PERFUSION MAPS: Symmetrical cerebral perfusion. No focal deficits in cerebral blood flow or volume to suggest ischemia/oligemia. The hypodense area marked on the perfusion maps corresponds to an area of artifact in the posterior left temporal lobe on   noncontrast head CT.    RAPID ANALYSIS:  CBF<30%: 0 mL  Tmax>6sec: 0 mL  Mismatch volume: 0 mL  Mismatch ratio: None      Impression    IMPRESSION:   1.  Normal cerebral perfusion.   CT Head w/o Contrast    Impression    RESIDENT PRELIMINARY INTERPRETATION  IMPRESSION:   1. No acute intracranial pathology.   2. Previously noted punctate left thalamic hypodensity seen on prior  CT is not as well-visualized on this follow-up study. Attention on  follow-up.

## 2023-10-07 NOTE — PROGRESS NOTES
STAFF NOTE:  Became hypotensive with IHD (UF of >2L)  Worsening hypotension & more prominent facial droop overnight -> code stroke; old left thalamic lacunar stoke found (no acute stroke)  Also incidental question of PE    Strength equal bilaterally  Still some facial droop on the right    Abdominal wound site looks fine  Vascular access sites look fine; the right internal jugular introducer site is fine, the femoral dialysis line site still looks ok; arterial lines are ok  LLE is mottled, more demarcated.  I still don't see obvious signs of concurrent infection.  There are some blistering on the thigh that is healing ok and appears well-perfused.    a wound vac on the calf has normal output    Labs showed a rise in BUN again, although acidosis is much better  None of the labs suggest an urgent need for dialysis, but     MRI brain for extent of stroke  CTA of neck suggested PE of R upper lobe    This is a 68M hx HTN, TIA, s/p open contained AAA rupture s/p repair with prolonged suprarenal and juxtarenal clamp.  Also had an unsuccessfull LLE embolectomy without perfusion to the MARY JANE from the popliteal downwand.    We have discovered an old stroke and an incidental PE, although they don't really explain the symptoms that he underwent all the imaging for last night.        Today will get an MRI brain to complete stroke evaluation, BLE doppler to see if the known left LLE DVT has migrated at all, start a statin and increase his heparin infusion to a therapeutic level, and probably will eventually get a ADDIS, but I'll defer to the neurocritical care service whether or not they think that necessary.    METABOLIC ENCEPHALOPATHY / DELIRIUM:  -melatonin + seroquel for sleep   -I suppose it's possible that hypotension could result in recrudescence of the old stroke, but there's nothing new to be concerned about    CHRONIC STROKE:  -long term target MAP goals will be <140mmHg  -etiology eval to include HgbA1c, lipid studies,  further cardiac & neurologic imaging including MRI today and possibly a ADDIS on Monday if neurocrit thinks it appropriate  -start ASA     Hx HTN:  -start metoprolol for tachycardia + HTN (will need outpatient hypertensive management, probably with somehting other than the combined agent he had been on at home); may need to be held for dialysis    HLD:  -start lipitor 40mg, even in the absence of checking cholesterol levels, given SPARCL trial (NEJ 2006), but will start lower dose     ISCHEMIC LLE:  -s/p fasciotomy.  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  I anticipate above-knee amputation on Tuesday.    -gabapentin 100tid for neuropathic pain  -now that he requires therapeutic anticoagulation for PE / DVT, he will no longer be a candidate for epidural.  Potentially Acute Pain Service would still be willing to offer at least a single-shot femoral / popliteal nerve block    THROMBOCYTOPENIA:  -resolved    DVT / INCIDENTAL PE:  -increase heparin to therapeutic dosing; will eventually need to be started on a DOAC (or warfarin given renal failure)  -repeat BLE dopplers; my presumption is that the clot came from the left leg    ABDOMINAL AORTIC RUPTURE:  -s/p open repair  -abdomen closed  -IV & enteral narcotics available for analgesia.    -metoprolol for HTN; hold before IHD    SEVERE PROTEIN-CALORIE MALNUTRITION:  -post-pyloric tube feeds are Pivot 1.5 at 50ml/h as goal   -still too delirious for adequate nutrition by mouth    DIARRHEA:  -potentially post-ischemic sloughing?  -no rectal aparicio given recent ischemia; we are doing what we can to manage with a rectal pouch    ACUTE KIDNEY FAILURE:  -contributing factors are massive hemorrhage, prolonged perirenal aortic clamp, possible ongoing infection / inflammation from the gangrenous leg  -Will see if nephrology is willing to offer IHD tomorrow.  If so, then we can probably wait.  If not, then consider restarting CRRT over the  weekend.    MISC:  -Code status is full code  -family updated at bedside  -therapeutic heparin infusion; PPI not necessary  -lines: RIJ dialysis line; L subclavian 3 lumen   -aparicio not necessary given anuria.    -anticipate discharge to transitional care unit in >1 week, and ultimate discharge to skilled nursing.  Amputation will be Tuesday; since didn't tolerate IHD without pressors, will keep here in unit probably until then, or at least until we see if he tolerates IHD tomorrow.    Billing statement: 42min of critical care time; spent in an initial review of imaging, labs, physical exam, and discussion of the patient with my own team and the extended care team including the primary service; and including family conference (the patient is unable to participate in this discussion because of current neurologic status), where we discussed my recommendations for medical management given my assessment of the patient's prognosis as described above.  Based on this patient's presentation / recent intervention and my bedside assessment, I felt there was or is a reasonably high probability of imminent or life-threatening deterioration today or tonight for hemorrhagic or neurologic reasons.   My overall critical care time, as described in detail above, includes such things as coordination of care, arrhythmia and hemodynamics management with infusions of medicines, respiratory management, fluid therapy including fluid boluses, and pain and sedation therapy. This time excludes time I spent personally performing or supervising procedures for this patient.    SHAMEKA Narvaez MD  Clinical   Anesthesia / Critical Care  *[phone]

## 2023-10-07 NOTE — PROGRESS NOTES
Brief SICU Progress note:  October 6, 2023    Paged to the bedside by nursing with concern for unequal pupil sizes. Made aware of left sided facial droop noted earlier in the day. Discussed with fellow, and code stroke called. Neuro critical care presented to patient's bedside promptly and decision made to proceed to CT for imaging.     Small age indeterminate stroke in the thalamus as well as incidental finding of RUL and RLL segmental Pes. Patient wastarted on high dose heparin GTT after further discussion with vascular fellow. Patient then developed headaches acutely following this. Further discussion with neurocrit with concern for acute hemorrhage and decided to present to CT a second time for CT Head non-con. Discussion about ordering CT PE study, however with concern for renal impairment and previous CT w/ contrast, we decided that this would not . Repeat CT Head without evidence of bleed. Patient then restarted on low intensity heparin GTT.     Remainder of night largely uneventful    Discussed with Vascular surgery fellow as well as SICU Staff.     Please page if questions,    Gilberto Ball MD, MS  PGY-3, General Surgery

## 2023-10-07 NOTE — PROGRESS NOTES
[facility]    Stroke Progress Note    Interval Events  Code stroke overnight for right facial droop.    HPI Summary  Alfredo Burnham is a 68 year old male with a PMH significant for HTN, TIA, b/l legally blind who presented to [facility] on 9/24/2023 with abdominal pain found to have a ruptured now POD13 for AAA repair.     On the evening of 10/6 a stroke code was called for new R facial droop and anisocoria that was noted on the morning of 10/6 by the primary team and had low concern for stroke. History was obtained from chart review and providers taking care of the patient. On 9/24 he underwent AAA repair requiring multiple blood transfusions. He has had a complicated hospital course and there has been concern for deconditioning. He remained intubated and suffered from abdominal pain and was found to have bowel ischemia and had an abdominal washout 09/25, s/p repeat abdominal washout, retroperitoneal closure, LLE wound washout/closure. 9/29 s/p repeat abd washout. He also had LLE ischemia and unsuccessful LLE embolectomy. He was extubated on 10/2 and since then has had a hoarse voice. Per report from primary team has been barely lifting his b/l UE and RLE anti gravity and has been weak. He can move his L hip but no movement below the L knee and there is plan for amputation. Other medical conditions during the stay include delirium, toxic metabolic encephalopathy, post operative caitie, thrombocytopenia, MAYA with oliguria on CRRT followed by HD. Blood glucose was 144 on my arrival and the patient had an NIHSS of 19 for drowsiness and dysarthric speech post extubation, was able to understand speech but it was not fluent, has history of bilateral legal blindness, was able to squeeze hands and wiggle right toe, elbows antigravity easily and arms with difficulty, could not lift right leg antigravity, intact left hip movement but no movement beyond left knee,  responded to noxious stimuli bilaterally, likely elevated based on deconditioning reported by primary team during prolonged complex hospital stay requiring multiple surgeries, sedation and paralytics. The new finding was right lower facial droop. R pupil was 3.62 mm Npi 4.3, L 4.33 Npi 3.6. CT head was without acute stroke or bleed, CTA was without proximal large vessel occlusion but did reveal concern for pulmonary embolism and had a normal perfusion scan. He was not a candidate for thrombolysis based on being outside 4.5 hours conventional time window, multiple major surgeries, thrombocytopenia and concern for GI bleed. He was not a candidate for thrombectomy considering absence of proximal large vessel occlusion.     Stroke Evaluation Summarized    MRI/Head CT MR brain w/o contrast:  1. Numerous, punctate late hyperacute to acute infarctions are concerning for an embolic etiology. No evidence of hemorrhagic conversion or intracranial hemorrhage otherwise.    Head CT:  1. No acute intracranial pathology.   2. Previously noted punctate left thalamic hypodensity seen on prior CT is not as well-visualized on this follow-up study likely due to volume averaging of a very tiny lesion.   Intracranial Vasculature Head CTA:  1. No significant stenosis, aneurysm, or high flow vascular malformation identified.    Cervical Vasculature Neck CTA:  1. No hemodynamically significant stenosis in the vessels of the neck.  2. Partially imaged pulmonary embolism of the right upper and lower lobes. A dedicated CT PE study is recommended to further evaluate thrombus burden and for potential right heart strain.  3. Moderate bilateral pleural effusions.  4. Multifocal biapical airspace disease.     Echocardiogram TTE:  1. The visual ejection fraction is >70%. Regional wall motion is probably normal. No significant valvular abnormalities present. Trivial pericardial effusion is present.   EKG/Telemetry EK. Sinus tachycardia with  frequent Premature ventricular complexes.  Nonspecific ST and T wave abnormality. Abnormal ECG.    Other Testing Not Applicable     LDL  10/7/2023: 32 mg/dL   A1C  9/25/2023: 5.7 %   Troponin 10/7/2023: 124 ng/L     Impression   Acute ischemic stroke of multiple vessel territory due to embolic stroke of undetermined source (ESUS)    Neuro  #Multivessel subacute to acute punctate strokes embolic    -LDL; 32 statin to be started by primary care team when appropriate    -A1c; 5.7  -ADDIS to rule out cardiac thrombus  -CT CAP to rule out malignancy given this is multivessel territory strokes  -Ok to resume Heparin infusion if needed   -PT/OT  -Stroke Education  -NCC will continue to follow, please call #[phone] with any questions or concers       TIME SPENT ON THIS ENCOUNTER   I spent 50 minutes of critical care time on the unit/floor managing the care of Alfredo Burnham excluding time performing procedures. Upon evaluation, this patient had a high probability of imminent or life-threatening deterioration due to acute ischemic stroke, which required my direct attention, intervention, and personal management. Greater than 50% of my time was spent at the bedside counseling the patient and/or coordinating care including chart review, history, exam, documentation, and further activities per this note. I have personally reviewed the following data/imaging over the past 24 hours.     The patient was seen and discussed with the NCC attending, Dr. Shanks.    Farrah FRIAS CNP  Neurocritical care nurse practitioner  Pager: [phone]  Ascom: [phone] available M-[place] [phone] to [phone]     Medications   Scheduled Meds   acetaminophen  975 mg Oral or Feeding Tube Q6H    atorvastatin  40 mg Oral or Feeding Tube QPM    B and C vitamin Complex with folic acid  10 mL Per Feeding Tube Daily    gabapentin  100 mg Oral Q8H [facility], [facility]1983    insulin aspart  1-12 Units Subcutaneous Q4H    melatonin  5 mg Oral or Feeding Tube QPM    metoprolol  tartrate  12.5 mg Oral or Feeding Tube BID    nystatin  500,000 Units Oral 4x Daily    polyethylene glycol  17 g Oral or Feeding Tube Daily    protein modular  1 packet Per Feeding Tube TID    QUEtiapine  25 mg Oral or Feeding Tube At Bedtime    senna-docusate  1 tablet Oral or Feeding Tube BID    sodium chloride (PF)  3 mL Intracatheter Q8H       Infusion Meds   dextrose      dextrose      heparin 1,050 Units/hr (10/07/23 1300)       PRN Meds  acetaminophen, bisacodyl, dextrose, dextrose, glucose **OR** dextrose **OR** glucagon, hydrALAZINE, HYDROmorphone, hydrOXYzine **OR** hydrOXYzine, labetalol, lidocaine 4%, lidocaine, lidocaine (buffered or not buffered), LORazepam, [Held by provider] magnesium hydroxide, methocarbamol, midodrine, naloxone **OR** naloxone **OR** naloxone **OR** naloxone, ondansetron **OR** ondansetron, oxyCODONE, prochlorperazine **OR** prochlorperazine, sodium chloride (PF)      PHYSICAL EXAMINATION  Temp:  [98.7  F (37.1  C)-100.4  F (38  C)] 98.7  F (37.1  C)  Pulse:  [] 97  Resp:  [12-24] 20  BP: ()/() 129/83  MAP:  [46 mmHg-243 mmHg] 76 mmHg  Arterial Line BP: ()/(37-73) 124/55  SpO2:  [85 %-100 %] 95 %   General: Adult male patient, lying in bed, critically-ill  HEENT: Normocephalic, atraumatic, no icterus  Cardiac: Sinus rhythm on bedside monitor   Pulm: Unlabored, expansion symmetric, no retractions or use of accessory muscles  Abdomen: Soft, non-distended abdomen   Extremities: Warm, mild to moderate generalized edema, no pulses to LLE, well perfused  Skin: Bruising   Psych: Calm and cooperative  Neuro:  Mental status: Very drowsy, required repeated stimulation to cooperate and follow commands, Speech slurred.   Cranial nerves: PERRL, conjugate gaze, EOMI, facial sensation intact, mild right facial droop, shoulder shrug strong, tongue midline, mild to moderate dysarthria.   Motor: Normal bulk and tone. No abnormal movements. 3/5 strength in bilateral upper  extremities. 2/5 strength in RLE with some purposeful movement. Did not examine LLE.  Sensory: Intact to light touch x 3 extremities (did not exam LLE)  Coordination: WENDI, deferred.   Gait: WENDI, deferred.    Stroke Scales    NIHSS  1a. Level of Consciousness 1-->Not alert, but arousable by minor stimulation to obey, answer, or respond   1b. LOC Questions 1-->Answers one question correctly   1c. LOC Commands 0-->Performs both tasks correctly   2.   Best Gaze 0-->Normal   3.   Visual 3-->Bilateral hemianopia (blind including cortical blindness)   4.   Facial Palsy 1-->Minor paralysis (flattened nasolabial fold, asymmetry on smiling)   5a. Motor Arm, Left 2-->Some effort against gravity, limb cannot get to or maintain (if cued) 90 (or 45) degrees, drifts down to bed, but has some effort against gravity   5b. Motor Arm, Right 2-->Some effort against gravity, limb cannot get to or maintain (if cued) 90 (or 45) degrees, drifts down to bed, but has some effort against gravity   6a. Motor Leg, Left 3-->No effort against gravity, leg falls to bed immediately   6b. Motor Leg, right 2-->Some effort against gravity, leg falls to bed by 5 secs, but has some effort against gravity   7.   Limb Ataxia 0-->Absent   8.   Sensory 0-->Normal, no sensory loss   9.   Best Language 0-->No aphasia, normal   10. Dysarthria 1-->Mild-to-moderate dysarthria, patient slurs at least some words and, at worst, can be understood with some difficulty   11. Extinction and Inattention  0-->No abnormality   Total 16 (10/07/23 1337)     Imaging  I personally reviewed all imaging; relevant findings per HPI.     Lab Results Data   CBC  Recent Labs   Lab 10/07/23  0838 10/07/23  0400 10/06/23  2338   WBC 13.2* 13.9* 16.7*   RBC 2.40* 2.35* 2.61*   HGB 7.8* 7.7* 8.4*   HCT 23.0* 23.1* 25.0*   * 133* 120*     Basic Metabolic Panel    Recent Labs   Lab 10/07/23  1140 10/07/23  0903 10/07/23  0428 10/06/23  2349 10/06/23  2155 10/06/23  2007  10/06/23  1823   NA  --   --  137  --  138  --  140   POTASSIUM  --   --  4.2  --  4.2  --  3.7   CHLORIDE  --   --  97*  --  98  --  98   CO2  --   --  23  --  24  --  24   BUN  --   --  91.5*  --  85.2*  --  77.2*   CR  --   --  3.51*  --  3.15*  --  2.86*   * 129* 145*   < > 124*   < > 147*   OMAR  --   --  8.0*  --  7.8*  --  8.1*    < > = values in this interval not displayed.     Liver Panel  Recent Labs   Lab 10/07/23  0428 10/06/23  0330 10/05/23  0349   PROTTOTAL 4.9* 4.9* 5.0*   ALBUMIN 2.5* 2.4* 2.6*   BILITOTAL 0.5 0.4 0.6   ALKPHOS 203* 253* 252*   AST 34 40 54*   ALT 22 28 31     INR    Recent Labs   Lab Test 10/06/23  2155 10/02/23  0305 10/01/23  0344   INR 1.19* 1.30* 1.28*      Lipid Profile    Recent Labs   Lab Test 10/07/23  0428 09/29/23  0351 09/27/23  0004   CHOL 87  --   --    HDL 23*  --   --    LDL 32  --   --    TRIG 160* 129 357*     A1C    Recent Labs   Lab Test 09/25/23 0344   A1C 5.7*     Troponin    Recent Labs   Lab 10/07/23  0428 10/06/23  2155   CTROPT 124* 112*

## 2023-10-07 NOTE — PLAN OF CARE
Goal Outcome Evaluation:    Neuro: Alert to self, occasionally situation and time. Drowsy, arouses to voice/stimulation. Whispers. Moves uppers, 3/5; wiggles R toes but no movement in L. L pupil still remains larger than R. R facial droop remains. MRI today. Q4 neuros per NCC.  CV: HR 80-120s. SBP goal <160, MAP >65. SICU notified of episode HR 50s in MRI. SICU did echo at bedside today.   Pulm: 5L NC, clear to coarse lungs.  GI: NJ, TF at goal. Loose stools. Rectal pouch.   : Oliguric. No HD today per nephrology.   Surg: Wound vacs in place.   IV/Gtt: R HD line. L subclavian. L art line.     Up to chair via lift today.    Will continue to monitor for safety and comfort.     Roro Olson RN

## 2023-10-07 NOTE — PROGRESS NOTES
Vascular Surgery Progress Note  10/07/2023       Subjective:  Stroke work-up done yesterday due to concern for it, and it was negative.     Objective:  Temp:  [98.8  F (37.1  C)-100.4  F (38  C)] 99.3  F (37.4  C)  Pulse:  [] 100  Resp:  [12-24] 14  BP: ()/() 129/83  MAP:  [46 mmHg-243 mmHg] 77 mmHg  Arterial Line BP: ()/(37-73) 135/56  SpO2:  [85 %-100 %] 98 %    I/O last 3 completed shifts:  In: 1431.19 [I.V.:161.19; NG/GT:570]  Out: 2775 [Emesis/NG output:50; Other:2000; Stool:725]      Gen: Wakes up to converse.  CV: RR per DP pulse, off pressors, sinus tach per tele with PACs  Resp: On 1L NC, non-labored  Abd: Wound vac in place, holding seal, abdomen soft to palpation around VAC.    Ext: RLE wwp. LLE cool to touch, dusky, continues to have redness on dorsal aspect of foot. Blistering appreciated on proximal calf posteriorly. no DP/PT signals on LLE. VAC change today    UOP: None since removal of aparicio  Temp abd closure output: 0ml over the last 48 hours.     Labs:  Recent Labs   Lab 10/07/23  0838 10/07/23  0400 10/06/23  2338   WBC 13.2* 13.9* 16.7*   HGB 7.8* 7.7* 8.4*   * 133* 120*         Recent Labs   Lab 10/07/23  0903 10/07/23  0428 10/07/23  0338 10/06/23  2349 10/06/23  2155 10/06/23  2007 10/06/23  1823 10/06/23  1116 10/06/23  1115 10/06/23  0337 10/06/23  0330   NA  --  137  --   --  138  --  140  --  138  --  139  139   POTASSIUM  --  4.2  --   --  4.2  --  3.7  --  5.6*  --  5.1  5.1   CHLORIDE  --  97*  --   --  98  --  98  --  103  --  106  106   CO2  --  23  --   --  24  --  24  --  15*  --  16*  16*   BUN  --  91.5*  --   --  85.2*  --  77.2*  --  136.0*  --  131.0*  131.0*   CR  --  3.51*  --   --  3.15*  --  2.86*  --  4.65*  --  4.15*  4.15*   * 145* 144*   < > 124*   < > 147*   < > 178*   < > 129*  129*   OMAR  --  8.0*  --   --  7.8*  --  8.1*  --  8.0*  --  7.9*  7.9*   MAG  --  2.1  --   --   --   --   --   --  2.6*  --  2.6*   PHOS  --   5.6*  --   --   --   --   --   --  7.1*  --  7.0*    < > = values in this interval not displayed.        Assessment/Plan:   68 year old male with PMH of HTN and previous TIA who presented with R sided abdominal pain found to have contained ruptured AAA, now s/p open AAA repair 9/24 into 9/25am with 30 min supraceliac clamp time, prolonged inter-renal clamp time, temporary abdominal closure. He remains critically ill in the ICU. He did have bloody stool postop with flex sig 9/25 demonstrating ischemic mucosal changes of the rectum, however on repeated abdominal looks he has had no evidence of transmural necrosis of the small or large intestine, or the rectum. On 9/29 he was started on CRRT given ongoing low UOP and rising Cr and failure of lasix challenge. Hemodynamically continues to do well off pressors since 9/29/23. Ongoing absent signals with ischemic LLE, note DVT in this leg, this is to be expected given ischemia and absent inflow. We will continue to monitor LLE given it is not currently making patient systemically ill and there is no indication for urgent intervention. S/p closure of abdominal fascia and subcutaneous tissue left open with wound VAC applied 10/2/23. Plan for L AKA potentially next week on Tuesday.    - Appreciate excellent ICU care.  - Stroke workup so far negative -- pending MRI.    - PE+ -- on hep gtt, pending venous duplex  - Hemodynamically stable no signs or symptoms of sepsis, with improved oxygenation requirements.    - Goal SBP <160, MAP >65  - Tolerates TF at goal for x3 days, without residuals. Appreciate nutrition consult.  - Appreciate nephrology recommendations  - Plan for HD today   - Thrombocytopenia improving, SQH  - Weaning IV steroids  - VAC change today by vascular surgery  - There is DVT of LLE popliteal vein and we expect there to be  thrombosis from stasis in the tibial and popliteal veins as this is the level the patient is ischemic. No indication to anticoagulate for  this.     Seen and d/w Dr. Lowe

## 2023-10-07 NOTE — PROGRESS NOTES
"Nephrology Progress Note  10/07/2023       Alfredo Burnham is a 68 yom with complex hx of TIA, HTN, blindness who presented to [facility] 9/24 with severe abdominal pain radiating to flank and back, CT revealed AAA with a contained rupture.  Taken to OR for aortobiiliac bypass and resection of ruptured pararenal aneurysm.   Post op course complicated by hypotension requiring pressors and metabolic acidosis.  Baseline Cr 1.0 on presentation but on the rise to >5 at time of renal consult for MAYA management and possible RRT.       Interval History :   Mr Burnham tolerated session of conventional dialysis yesterday and had a UF of 2 L.  His obligatory intakes have improved further.     Assessment & Recommendations:   MAYA-Baseline Cr 1.0, ordered UA.  CT showed benign cyst but otherwise normal kidneys.  Cr on the rise since surgery, did receive contrast for CTA.  Urine microscopy showed granular casts suggesting ATN which will recover with time and stabilizing hemodynamics.  Started on CRRT 9/30 for volume and clearance with minimal UOP and rising Cr.                  -Started CRRT 9/30 for volume and clearance, stopped 10/4 and now running iHD PRN and watching for recovery.     -will plan on HD tomorrow, 10/8, for continued volume optimization       -Team is re-wiring RIJ central line to HD line today                 -Dialysis consent signed and scanned into media tab.      Volume-Total body volume overloaded but much of it is likely 3rd spaced with low albumin and acute illness.  Net negative with CRRT stopping mid-afternoon.  Plan for HD tomorrow     Maria Isabel Oconnor MD      Review of Systems:   I reviewed the following systems:  ROS not done due to lethargy    Physical Exam:   I/O last 3 completed shifts:  In: 1431.19 [I.V.:161.19; NG/GT:570]  Out: 2775 [Emesis/NG output:50; Other:2000; Stool:725]   /83   Pulse 97   Temp 98.7  F (37.1  C) (Axillary)   Resp 20   Ht 1.727 m (5' 8\")   Wt 89.1 kg (196 lb " 6.9 oz)   SpO2 95%   BMI 29.87 kg/m       GENERAL APPEARANCE: Extubated, lethargic, in no distress.   EYES: No scleral icterus  Pulmonary: On vent 40%/8 of PEEP  CV: Regular rhythm, normal rate   - Edema +2-3 generalized, ++ scrotal edema.    GI: distended, nontender  MS: no evidence of inflammation in joints, no muscle tenderness  : No Lu  SKIN: no rash, warm, dry  NEURO: No focal deficits.         Labs:   All labs reviewed by me  Electrolytes/Renal -   Recent Labs   Lab Test 10/07/23  1140 10/07/23  0903 10/07/23  0428 10/06/23  2349 10/06/23  2155 10/06/23  2007 10/06/23  1823 10/06/23  1116 10/06/23  1115 10/06/23  0337 10/06/23  0330   NA  --   --  137  --  138  --  140  --  138  --  139  139   POTASSIUM  --   --  4.2  --  4.2  --  3.7  --  5.6*  --  5.1  5.1   CHLORIDE  --   --  97*  --  98  --  98  --  103  --  106  106   CO2  --   --  23  --  24  --  24  --  15*  --  16*  16*   BUN  --   --  91.5*  --  85.2*  --  77.2*  --  136.0*  --  131.0*  131.0*   CR  --   --  3.51*  --  3.15*  --  2.86*  --  4.65*  --  4.15*  4.15*   * 129* 145*   < > 124*   < > 147*   < > 178*   < > 129*  129*   OMAR  --   --  8.0*  --  7.8*  --  8.1*  --  8.0*  --  7.9*  7.9*   MAG  --   --  2.1  --   --   --   --   --  2.6*  --  2.6*   PHOS  --   --  5.6*  --   --   --   --   --  7.1*  --  7.0*    < > = values in this interval not displayed.         CBC -   Recent Labs   Lab Test 10/07/23  0838 10/07/23  0400 10/06/23  2338   WBC 13.2* 13.9* 16.7*   HGB 7.8* 7.7* 8.4*   * 133* 120*         LFTs -   Recent Labs   Lab Test 10/07/23  0428 10/06/23  0330 10/05/23  0349   ALKPHOS 203* 253* 252*   BILITOTAL 0.5 0.4 0.6   ALT 22 28 31   AST 34 40 54*   PROTTOTAL 4.9* 4.9* 5.0*   ALBUMIN 2.5* 2.4* 2.6*         Iron Panel - No lab results found.        Current Medications:   acetaminophen  975 mg Oral or Feeding Tube Q6H    atorvastatin  40 mg Oral or Feeding Tube QPM    B and C vitamin Complex with folic acid  10  mL Per Feeding Tube Daily    gabapentin  100 mg Oral Q8H COLUMBA    insulin aspart  1-12 Units Subcutaneous Q4H    melatonin  5 mg Oral or Feeding Tube QPM    metoprolol tartrate  12.5 mg Oral or Feeding Tube BID    nystatin  500,000 Units Oral 4x Daily    polyethylene glycol  17 g Oral or Feeding Tube Daily    protein modular  1 packet Per Feeding Tube TID    QUEtiapine  25 mg Oral or Feeding Tube At Bedtime    senna-docusate  1 tablet Oral or Feeding Tube BID    sodium chloride (PF)  3 mL Intracatheter Q8H      dextrose      dextrose      heparin 1,050 Units/hr (10/07/23 1300)

## 2023-10-07 NOTE — PROGRESS NOTES
10/07/23 1400   Appointment Info   Signing Clinician's Name / Credentials (PT) marti morocho,pt   Living Environment   People in Home spouse   Current Living Arrangements apartment   Home Accessibility no concerns  (elevator access)   Transportation Anticipated family or friend will provide   Living Environment Comments lives with spouse in [address] apt building; spouse/family can assist at home   Self-Care   Usual Activity Tolerance good   Current Activity Tolerance poor   Regular Exercise Yes   Activity/Exercise Type walking   Exercise Amount/Frequency 30 mins;daily   Equipment Currently Used at Home none   Fall history within last six months no   Activity/Exercise/Self-Care Comment IND with all functional mobility and ADLs   General Information   Onset of Illness/Injury or Date of Surgery 09/24/23   Referring Physician Clint Braga MD   Patient/Family Therapy Goals Statement (PT) return home   Pertinent History of Current Problem (include personal factors and/or comorbidities that impact the POC) 68 year old male with PMH of HTN and previous TIA who presented with R sided abdominal pain found to have contained ruptured AAA, now s/p open AAA repair 9/24 into 9/25am with 30 min supraceliac clamp time, prolonged inter-renal clamp time, temporary abdominal closure. He remains critically ill in the ICU. He did have bloody stool postop with flex sig 9/25 demonstrating ischemic mucosal changes of the rectum, however on repeated abdominal looks he has had no evidence of transmural necrosis of the small or large intestine, or the rectum. On 9/29 he was started on CRRT given ongoing low UOP and rising Cr and failure of lasix challenge. Hemodynamically continues to do well off pressors since 9/29/23. Ongoing absent signals with ischemic LLE, note DVT in this leg, this is to be expected given ischemia and absent inflow. We will continue to monitor LLE given it is not currently making patient systemically ill and there is no  indication for urgent intervention. S/p closure of abdominal fascia and subcutaneous tissue left open with wound VAC applied 10/2/23. Plan for L AKA potentially next week on Tuesday.   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, has peripheral vision   Cognition   Affect/Mental Status (Cognition) low arousal/lethargic   Orientation Status (Cognition) oriented x 3   Follows Commands (Cognition) follows one-step commands   Pain Assessment   Patient Currently in Pain Yes, see Vital Sign flowsheet   Posture    Posture Forward head position;Protracted shoulders   Range of Motion (ROM)   ROM Comment B LE grossly WFL   Strength (Manual Muscle Testing)   Strength Comments B LE grossly 3/5   Transfers   Comment, (Transfers) bed > chair via lift   Balance   Balance Comments unable to maintain sitting balance at edge of chair   Sensory Examination   Sensory Perception Comments light touch/proprioception intact R LE; L LE not tested   Muscle Tone   Muscle Tone no deficits were identified   Clinical Impression   Criteria for Skilled Therapeutic Intervention Yes, treatment indicated   PT Diagnosis (PT) impaired functional mobility s/p AAA repair, subsequent complications and planned L AKA   Influenced by the following impairments pain, confusion, decreased strength, impaired balance, decreased activity tolerance   Functional limitations due to impairments impaired bed mobility, impaired transfers, impaired gait   Clinical Presentation (PT Evaluation Complexity) Evolving/Changing   Clinical Presentation Rationale comorbidities, clinical judgement   Clinical Decision Making (Complexity) moderate complexity   Planned Therapy Interventions (PT) balance training;bed mobility training;gait training;neuromuscular re-education;prosthetic fitting/training;strengthening;transfer training;wheelchair management/propulsion training;progressive activity/exercise   Risk &  Benefits of therapy have been explained evaluation/treatment results reviewed;care plan/treatment goals reviewed   PT Total Evaluation Time   PT Eval, Moderate Complexity Minutes (59025) 7   Plan of Care Review   Plan of Care Reviewed With patient;spouse;son;daughter   Physical Therapy Goals   PT Frequency 6x/week   PT Predicted Duration/Target Date for Goal Attainment 10/28/23   PT Goals Bed Mobility;Transfers;Gait;PT Goal 1   PT: Bed Mobility Independent;Supine to/from sit   PT: Transfers Minimal assist;Sit to/from stand;Assistive device  (FWW)   PT: Gait Minimal assist;Rolling walker;25 feet   PT: Goal 1 bed <> wheelchair min assist   PT Discharge Planning   PT Plan sit > stand from chair if more alert prior to planned AKA; reevaluation after AKA; get OT order for post AKA   PT Discharge Recommendation (DC Rec) Acute [facility]hab [facility]-Motivated patient will benefit from intensive, interdisciplinary therapy.  Anticipate will be able to tolerate 3 hours of therapy per day   PT Rationale for DC Rec dependence with functional mobility; previously IND will benefit from intensive rehab setting to allow return to community based living   PT Brief overview of current status bed > chair via lift   Total Session Time   Total Session Time (sum of timed and untimed services) 7

## 2023-10-07 NOTE — PLAN OF CARE
Goal Outcome Evaluation:    Major Shift Events:  Stroke code called and trending troponin.  Neuro: Stroke called for eye L>R, CT done concern for demand ischemia and r/o stroke.  Pupil reactive.  Follows commands, Moves uppers 3/5, lowers L with 1/5,  R with 2/5.  Alert to self states his full name and  only.  After coming back from CT, neuro checks q15 X4, q30X4 and q1hr now.  CT negative to acute bleed.  Cards: SR in 100s with PVCs, Stopped levo, SBP>140, Map >65.  Tmax 100.4, provider aware, gave schedule tylenol (now 99.4) and wet rag forehead and fan in use.  Heparin gtt started c/o headache provider was notified, stopped went for another CT negative for bleeding, restarted heparin at lower dose pt. Denies headache.  Trending troponin q6hrs (112, 124).  Resp; on nc 1l and sats in 90s, coarse LS, with productive cough, oral secution with thick tan secrections.  Found new pulmonary embolism SaO2 dropped to high 80s NC @2L now.  GI/: Emesis at 2200, stopped TF, Zofran given IV, TF paused for 2hrs restarted at 0000.  Oliguric, had HD on 10/6/23, rectal pouch with 300cc green liquid  stool.    Access:   Piv X2, R-CVC, l-subclavian triple lumen picc.   Skin: ABD wound vac, L-leg shin wound vac.  Scrotal edema, penile pressure injury at the orifice.   Plan: Will continue with cares and notify MD of Status changes.  For vital signs and complete assessments, please see documentation flowsheets.

## 2023-10-07 NOTE — PROGRESS NOTES
I visited Mr. Burnham and his family today.  He is joined at bedside by his wife and brothers in law with whom he is incredibly close.  Last evening there was concern for facial droop and asymmetric pupils.  I had last seen him at approximately 630 pm at which time his pupils were equal and his facial droop was similar to that preoperatively with a history of a known left-sided stroke.  His wife confirms that his smile has not been symmetric for some time again we discussed today.  His CT head did reveal the likely chronic left thalamic hypodensity appearing the previous lacunar infarct.  He is supposed to undergo MRI this morning.  No evidence of large vessel occlusion on CTA of the head and neck.  Incidentally he was found to have right upper lobe pulmonary emboli.  He is just recently undergone work-up for this.  We instituted anticoagulation with therapeutic heparinization overnight.  He will continue on aspirin.  Dr. Narvaez has performed point-of-care echocardiogram which did not show evidence of right heart strain.  He is undergoing venous duplex bilaterally to see if there is extension from the previous ultrasound.  We will continue to plan for left above-knee amputation on Tuesday.  Dr. Narvaez would also like to return to CRRT today and is discussing this with nephrology.

## 2023-10-07 NOTE — PROVIDER NOTIFICATION
Stroke Code Nurse-Responder Note    Arrival Time to Stroke Code: 2037    Stroke Code Team interventions:   - De-escalated at 2122 by Ambrocio Herrmann MD    ED/Bedside Nurse providing handoff: Savi Roche RN    Time left for CT: 2047    Time arrived to next location (ED/Unit/IR): Returned to unit [id number] at 2125    ED/Bedside Nurse given handoff (name/time): Savi Schuler RN

## 2023-10-07 NOTE — CONSULTS
[facility]    Stroke Consult Note    Reason for Consult: Stroke Code     Chief Complaint: Pelvic Pain (Right side radiates in back)      SAUMYA Burnham is a 68 year old male HTN, TIA, b/l legally blind who presented with abdominal pain and was found to have ruptured AAA and is s/p AAA repair 09/24/23. Stroke code was called for new R facial droop and anisocoria that was noted on the morning of 10/6/23 by the primary team and had low concern for stroke. History was obtained from chart review and providers taking care of the patient. He was admitted 09/24 and underwent AAA repair requiring multiple blood transfusions. He has had a complicated hospital course and there has been concern for deconditioning. He remained intubated and suffered from abdominal pain and was found to have bowel ischemia and had an abdominal washout 0925, s/p repeat abdominal washout, retroperitoneal closure, LLE wound washout/closure. 9/29 s/p repeat abd washout. He also had LLE ischemia and unsuccessful LLE embolectomy. He was extubated on 10/2 and since then has had a hoarse voice. Per report from primary team has been barely lifting his b/l UE and RLE anti gravity and have been weak. He can move his L hip but no movement below the L knee and there is plan for amputation. Other medical conditions during the stay include delirium, toxic metabolic encephalopathy, post operative caitie, MAYA, olgiuria, thrombocytopenia and has been CRRT followed by HD.  Blood glucose was 144 on my arrival the patient had an NIHSS of 19 drowsiness, dysarthric speech post extubation, was able to understand speech but it was not fluent, no history of bilateral legal blindness, was able to squeeze hands and wiggle right toe, elbows antigravity easily and arms with difficulty, could not lift right leg antigravity, thumb movement intact hip but no movement beyond left knee, responded to noxious stimuli  bilaterally, likely elevated based on deconditioning reported by primary team during prolonged complex hospital stay requiring multiple surgeries, sedation and paralytics.  The new finding was right lower facial droop. R pupil was 3.62 mm Npi 4.3, L 4.33 Npi 3.6. CT head was without acute stroke or bleed, CTA was without proximal large vessel occlusion but did reveal concern for pulmonary embolism and had a normal perfusion scan.  He was not a candidate for thrombolysis based on being outside 4.5 hours conventional time window, multiple major surgeries, thrombocytopenia and concern for GI bleed.  He was not a candidate for thrombectomy considering absence of proximal large vessel occlusion.      Imaging Findings  HEAD CT:  1.  No acute intracranial hemorrhage, extra-axial collection, or midline shift.  2.  Age-indeterminate though chronic appearing left thalamic lacunar infarct.  3.  Mild age-related changes as above.     HEAD CTA:   1.  No significant stenosis, aneurysm, or high flow vascular malformation identified.     NECK CTA:  1.  No hemodynamically significant stenosis in the vessels of the neck.  2.  Partially imaged pulmonary embolism of the right upper and lower lobes. A dedicated CT PE study is recommended to further evaluate thrombus burden and for potential right heart strain.  3.  Moderate bilateral pleural effusions.  4.  Multifocal biapical airspace disease.    IMPRESSION:   1.  Normal cerebral perfusion.    Intravenous Thrombolysis  Not given due to:   - GI bleed within the past 14 days  - surgery/trauma within the past 14 days  - unclear or unfavorable risk-benefit profile for extended window thrombolysis beyond the conventional 4.5 hour time window    Endovascular Treatment  Not initiated due to absence of proximal vessel occlusion    Impression   # Likely acute ischemic stroke 2/2 cardio embolism  # Ruptured AAA s/p repair, bowel ischemic, LLE ischemia, possible PE  # GI bleed,  thrombocytopenia  68 year old male with PMHx of HTN, TIA, b/l legally blind who is s/p ruptured AAA repair 09/24/23, bowel ischemia s/p multiple washouts, LLE ischemia, GI bleed, thrombocytopenia, MAYA requiring HD, extubated. Stroke code was called for new R facial droop and anisocoria. NIHSS was 19 largely elevated for reported deconditioning following multiple surgeries with sedation, paralytics, prolonged intubation, new finding was of R facial droop per report noted on the morning of 10/06/2023 by the primary team. Difference in pupil size is less than 1 mm and are equally reactive. Unclear how long he has had anisocoria for. CT head was without acute stroke or bleed, CTA was without proximal large vessel occlusion but did reveal concern for pulmonary embolism and had a normal perfusion scan.  He was not a candidate for thrombolysis based on being outside 4.5 hours conventional time window, multiple major surgeries, thrombocytopenia and concern for GI bleed.  He was not a candidate for thrombectomy considering absence of proximal large vessel occlusion.    Suspect patient likely had an ischemic stroke based on finding of R facial droop in the setting of risk factors ruptured AAA repair, multi organ ischemic events (bowel, LLE, PE) likely cardio embolic and would suspect would be the most likely etiology for stroke. Will recommend MRI Brain to evaluate for stroke. Anti platelet would be recommended from stroke standpoint however, he has thrombocytopenia, bowel ischemia, recent GI bleeding and would defer starting daily aspirin when ok with primary team.    Update: Heparin drip is indicated for PE. Patient was started on high intensity heparin drip for PE, there is potential for hemorrhagic conversion of moderate to large sized stroke that cannot be seen initial CT head and we would need an MRI to characterize the size of a possible stroke. Discussed our impression with primary team. Patient had headache and was  "recommended to stop heparin drip, repeat CT head to rule out bleed. In case CT head is without bleed, will be okay with low intensity heparin without bolus from stroke standpoint.     Recommendations  - CT Head without contrast now, will follow results  - MRI Brain with and without contrast when able  - Ok for low intensity heparin without bolus from stroke standpoint  - Defer care for new found possible PE to primary team  - PT/OT/SLP  - NPO, has tube feed    Patient Follow-up     - final recommendation pending work-up    Thank you for this consult. We will continue to follow.      The patient was discussed with Stroke Staff Dr. Thorne.    Ambrocio Herrmann MD  Neurology Resident PGY-3  ASCOM* [id number]  ______________________________________________________    Clinically Significant Risk Factors        # Hyperkalemia: Highest K = 5.6 mmol/L in last 2 days, will monitor as appropriate   # Hypocalcemia: Lowest iCa = 4.1 mg/dL in last 2 days, will monitor and replace as appropriate    # Anion Gap Metabolic Acidosis: Highest Anion Gap = 20 mmol/L in last 2 days, will monitor and treat as appropriate  # Hypoalbuminemia: Lowest albumin = 1.5 g/dL at 9/28/2023  7:53 AM, will monitor as appropriate  # Coagulation Defect: INR = 1.19 (Ref range: 0.85 - 1.15) and/or PTT = 31 Seconds (Ref range: 22 - 38 Seconds), will monitor for bleeding    # Thrombocytopenia: Lowest platelets = 94 in last 2 days, will monitor for bleeding     # Hypertension: Noted on problem list        # Overweight: Estimated body mass index is 29.87 kg/m  as calculated from the following:    Height as of this encounter: 1.727 m (5' 8\").    Weight as of this encounter: 89.1 kg (196 lb 6.9 oz).     # Moderate Malnutrition: based on nutrition assessment             Past Medical History   Past Medical History:   Diagnosis Date    Hypertension 2/8/2011    Macular degeneration      Past Surgical History   Past Surgical History:   Procedure Laterality Date    " INCISION AND DRAINAGE ABDOMEN WASHOUT, COMBINED N/A 9/25/2023    Procedure: abdominal washout, combined, repacking,  abthera placement;  Surgeon: Ash Boucher MBBS;  Location: [facility]    INCISION AND DRAINAGE ABDOMEN WASHOUT, COMBINED N/A 9/26/2023    Procedure: Abdominal washout, Retroperitoneal closure, washout left lower extremity wound;  Surgeon: Boris Lowe;  Location: [facility] OR    IR OR ANGIOGRAM  9/25/2023    IRRIGATION AND DEBRIDEMENT ABDOMEN WASHOUT, COMBINED N/A 9/29/2023    Procedure: exploration of  ABDOMINAL CAVITY, placement of abthera;  Surgeon: Boris Lowe;  Location: [facility]    IRRIGATION AND DEBRIDEMENT ABDOMEN WASHOUT, COMBINED N/A 10/2/2023    Procedure: Exploratory laparotomy, abominal closure, wound vac change left lower extremity;  Surgeon: Jonathan Fry MD;  Location: [facility]    IRRIGATION AND DEBRIDEMENT LOWER EXTREMITY, COMBINED Left 9/29/2023    Procedure: exploration of left lower extremity, partial closure of left lower extremity, wound vac placement;  Surgeon: Boris Lowe;  Location: [facility]    LAPAROTOMY EXPLORATORY N/A 9/25/2023    Procedure: Laparotomy exploratory;  Surgeon: Roger Shirley MD;  Location: [facility]    REPAIR ANEURYSM ABDOMINAL AORTA N/A 9/24/2023    Procedure: Resection of Ruptureed Abdominal Aneurysm, Aortic biliary bypass with 20 x 10 mm Bifurcated hemagard graft, Temporary Abdominal Closure, Endovascular Balloon Inclusion of Aorta;  Surgeon: Boris Lowe;  Location: [facility]    SIGMOIDOSCOPY FLEXIBLE N/A 9/25/2023    Procedure: Sigmoidoscopy flexible;  Surgeon: Gabino Walker MD;  Location: [facility]    THROMBECTOMY LOWER EXTREMITY Left 9/25/2023    Procedure: SFA, popliteal, and tibial thromboembolectomy and four compartment fasciotomy;  Surgeon: Ash Boucher MBBS;  Location: [facility]     Medications   Home Meds  Prior to Admission medications    Medication Sig Start Date End Date Taking? Authorizing Provider    amLODIPine-benazepril (LOTREL) 5-20 MG capsule Take 1 capsule by mouth daily   Yes Reported, Patient       Scheduled Meds   acetaminophen  975 mg Oral or Feeding Tube Q6H    B and C vitamin Complex with folic acid  10 mL Per Feeding Tube Daily    gabapentin  100 mg Oral Q8H COLUMBA    heparin ANTICOAGULANT Loading dose  80 Units/kg Intravenous Once    insulin aspart  1-12 Units Subcutaneous Q4H    melatonin  5 mg Oral or Feeding Tube QPM    nystatin  500,000 Units Oral 4x Daily    polyethylene glycol  17 g Oral or Feeding Tube Daily    protein modular  1 packet Per Feeding Tube TID    QUEtiapine  25 mg Oral or Feeding Tube At Bedtime    senna-docusate  1 tablet Oral or Feeding Tube BID    sodium chloride (PF)  3 mL Intracatheter Q8H       Infusion Meds   dextrose      dextrose      heparin      norepinephrine Stopped (10/06/23 2050)       PRN Meds  acetaminophen, bisacodyl, dextrose, dextrose, glucose **OR** dextrose **OR** glucagon, hydrALAZINE, HYDROmorphone, hydrOXYzine **OR** hydrOXYzine, labetalol, lidocaine 4%, lidocaine, lidocaine (buffered or not buffered), [Held by provider] magnesium hydroxide, methocarbamol, midodrine, naloxone **OR** naloxone **OR** naloxone **OR** naloxone, ondansetron **OR** ondansetron, oxyCODONE, prochlorperazine **OR** prochlorperazine, sodium chloride (PF)    Allergies   Allergies   Allergen Reactions    Penicillins Swelling     Facial swelling    Has tolerated cefazolin, cefepime     Family History   Family History   Problem Relation Age of Onset    Unknown/Adopted Mother     Heart Disease Mother     Arthritis Mother     Hypertension Brother     Blood Disease Sister     Psychotic Disorder Sister      Social History   Social History     Tobacco Use    Smoking status: Every Day     Packs/day: 0.50     Types: Cigarettes   Substance Use Topics    Alcohol use: Yes     Comment: 1-2/wk    Drug use: No       Review of Systems   The 10 point Review of Systems is negative other than noted in  the HPI or here.        PHYSICAL EXAMINATION  Temp:  [98.8  F (37.1  C)-100.1  F (37.8  C)] 100.1  F (37.8  C)  Pulse:  [] 109  Resp:  [12-23] 21  BP: ()/() 129/83  MAP:  [46 mmHg-101 mmHg] 85 mmHg  Arterial Line BP: ()/(37-73) 133/65  SpO2:  [90 %-98 %] 93 %     General Exam  General:  Drowsy, nasal cannula  HEENT:  Normocephalic, atraumatic. RIJ dialysis line   Cardio:  tachycardic  Pulmonary:  no respiratory distress  Abdomen:  soft  Extremities:  LLE cool, dusky, VAC on LLE  Skin:  bruising present     Neuro Exam    Mental status: Drowsy, follows 1 step command, did not answer orientation questions, said age is 48, did not respond to month or year    Speech: non Fluent, comprehension and repetition intact; moderate dysarthria post extubation    Cranial nerves: blinks to threat b/l, says he can see a hand moving,  Eyes conjugate, R pupil was 3.62 mm Npi 4.3, L 4.33 Npi 3.6, EOMI w/ normal, R lower facial droop, facial sensation intact and   symmetric, hearing intact to conversation, shoulder shrug strong, palate rise   symmetric, tongue/uvula midline.    Motor: Tone normal. Can weakly  both hands, shows thumbs up, can raise elbow easily when arm is supported by bed, can raise arm with difficulty anti gravity, localized both UE to pain, RLE briefly lifts anti gravity, LLE can move but not antigravity, no movement seen beyond knee    Reflexes: Toes mute    Sensory: Intact to LT, No lateral extinction     Coordination: Unable to assess limited by strength    Gait: Deferred      Dysphagia Screen  Dysarthria or facial droop present - Maintain NPO, consult SLP    Stroke Scales    NIHSS  1a. Level of Consciousness 1-->Not alert, but arousable by minor stimulation to obey, answer, or respond   1b. LOC Questions 2-->Answers neither question correctly   1c. LOC Commands 0-->Performs both tasks correctly   2.   Best Gaze 0-->Normal   3.   Visual 3-->Bilateral hemianopia (blind including cortical  blindness)   4.   Facial Palsy 2-->Partial paralysis (total or near-total paralysis of lower face)   5a. Motor Arm, Left 2-->Some effort against gravity, limb cannot get to or maintain (if cued) 90 (or 45) degrees, drifts down to bed, but has some effort against gravity   5b. Motor Arm, Right 2-->Some effort against gravity, limb cannot get to or maintain (if cued) 90 (or 45) degrees, drifts down to bed, but has some effort against gravity   6a. Motor Leg, Left 3-->No effort against gravity, leg falls to bed immediately   6b. Motor Leg, right 2-->Some effort against gravity, leg falls to bed by 5 secs, but has some effort against gravity   7.   Limb Ataxia 0-->Absent   8.   Sensory 0-->Normal, no sensory loss   9.   Best Language 0-->No aphasia, normal   10. Dysarthria 2-->Severe dysarthria, patients speech is so slurred as to be unintelligible in the absence of or out of proportion to any dysphasia, or is mute/anarthric   11. Extinction and Inattention  0-->No abnormality   Total 19 (10/06/23 2106)       Modified Christofer Score (Pre-morbid)  0-No deficits    Imaging  I personally reviewed all imaging; relevant findings per HPI.     Lab Results Data   CBC  Recent Labs   Lab 10/06/23  2155 10/06/23  0330 10/05/23  1248   WBC 14.1* 12.9* 13.5*   RBC 2.45* 2.46* 2.45*   HGB 8.1* 7.8* 7.9*   HCT 24.0* 24.4* 24.2*   * 112* 95*     Basic Metabolic Panel    Recent Labs   Lab 10/06/23  2155 10/06/23  2007 10/06/23  1823 10/06/23  1116 10/06/23  1115     --  140  --  138   POTASSIUM 4.2  --  3.7  --  5.6*   CHLORIDE 98  --  98  --  103   CO2 24  --  24  --  15*   BUN 85.2*  --  77.2*  --  136.0*   CR 3.15*  --  2.86*  --  4.65*   * 144* 147*   < > 178*   OMAR 7.8*  --  8.1*  --  8.0*    < > = values in this interval not displayed.     Liver Panel  Recent Labs   Lab 10/06/23  0330 10/05/23  0349 10/04/23  1241 10/04/23  0334   PROTTOTAL 4.9* 5.0*  --  5.3*   ALBUMIN 2.4* 2.6* 2.7* 2.7*  2.7*   BILITOTAL 0.4  0.6  --  0.9   ALKPHOS 253* 252*  --  234*   AST 40 54*  --  80*   ALT 28 31  --  30     INR    Recent Labs   Lab Test 10/06/23  2155 10/02/23  0305 10/01/23  0344   INR 1.19* 1.30* 1.28*      Lipid Profile    Recent Labs   Lab Test 09/29/23  0351 09/27/23  0004   TRIG 129 357*     A1C    Recent Labs   Lab Test 09/25/23 0344   A1C 5.7*     Troponin    Recent Labs   Lab 10/06/23  2155   CTROPT 112*          Stroke Code Data Data   Stroke Code Data  (for stroke code without tele)  Stroke code activated 10/06/23   2032   First stroke provider response         Last known normal 10/06/23   0000   Time of discovery   (or onset of symptoms)         Head CT read by Stroke Neuro Dr/Provider 10/06/23   0900   Was stroke code de-escalated? Yes 10/06/23 212

## 2023-10-08 ENCOUNTER — APPOINTMENT (OUTPATIENT)
Dept: GENERAL RADIOLOGY | Facility: CLINIC | Age: 68
DRG: 268 | End: 2023-10-08
Payer: COMMERCIAL

## 2023-10-08 LAB
ALBUMIN SERPL BCG-MCNC: 2.6 G/DL (ref 3.5–5.2)
ALP SERPL-CCNC: 193 U/L (ref 40–129)
ALT SERPL W P-5'-P-CCNC: 23 U/L (ref 0–70)
ANION GAP SERPL CALCULATED.3IONS-SCNC: 16 MMOL/L (ref 7–15)
ANION GAP SERPL CALCULATED.3IONS-SCNC: 19 MMOL/L (ref 7–15)
APTT PPP: 81 SECONDS (ref 22–38)
AST SERPL W P-5'-P-CCNC: 36 U/L (ref 0–45)
BILIRUB DIRECT SERPL-MCNC: 0.27 MG/DL (ref 0–0.3)
BILIRUB SERPL-MCNC: 0.4 MG/DL
BUN SERPL-MCNC: 116 MG/DL (ref 8–23)
BUN SERPL-MCNC: 117 MG/DL (ref 8–23)
CA-I BLD-MCNC: 4.2 MG/DL (ref 4.4–5.2)
CALCIUM SERPL-MCNC: 8 MG/DL (ref 8.8–10.2)
CALCIUM SERPL-MCNC: 8.1 MG/DL (ref 8.8–10.2)
CHLORIDE SERPL-SCNC: 95 MMOL/L (ref 98–107)
CHLORIDE SERPL-SCNC: 97 MMOL/L (ref 98–107)
CREAT SERPL-MCNC: 4.87 MG/DL (ref 0.67–1.17)
CREAT SERPL-MCNC: 5.08 MG/DL (ref 0.67–1.17)
DEPRECATED HCO3 PLAS-SCNC: 22 MMOL/L (ref 22–29)
DEPRECATED HCO3 PLAS-SCNC: 22 MMOL/L (ref 22–29)
EGFRCR SERPLBLD CKD-EPI 2021: 12 ML/MIN/1.73M2
EGFRCR SERPLBLD CKD-EPI 2021: 12 ML/MIN/1.73M2
ERYTHROCYTE [DISTWIDTH] IN BLOOD BY AUTOMATED COUNT: 19.1 % (ref 10–15)
ERYTHROCYTE [DISTWIDTH] IN BLOOD BY AUTOMATED COUNT: 19.1 % (ref 10–15)
GLUCOSE BLDC GLUCOMTR-MCNC: 129 MG/DL (ref 70–99)
GLUCOSE BLDC GLUCOMTR-MCNC: 132 MG/DL (ref 70–99)
GLUCOSE BLDC GLUCOMTR-MCNC: 133 MG/DL (ref 70–99)
GLUCOSE BLDC GLUCOMTR-MCNC: 136 MG/DL (ref 70–99)
GLUCOSE BLDC GLUCOMTR-MCNC: 153 MG/DL (ref 70–99)
GLUCOSE BLDC GLUCOMTR-MCNC: 170 MG/DL (ref 70–99)
GLUCOSE SERPL-MCNC: 145 MG/DL (ref 70–99)
GLUCOSE SERPL-MCNC: 152 MG/DL (ref 70–99)
HCT VFR BLD AUTO: 21.5 % (ref 40–53)
HCT VFR BLD AUTO: 21.8 % (ref 40–53)
HGB BLD-MCNC: 7.1 G/DL (ref 13.3–17.7)
HGB BLD-MCNC: 7.3 G/DL (ref 13.3–17.7)
INR PPP: 1.13 (ref 0.85–1.15)
LACTATE SERPL-SCNC: 1.1 MMOL/L (ref 0.7–2)
LACTATE SERPL-SCNC: 1.5 MMOL/L (ref 0.7–2)
MAGNESIUM SERPL-MCNC: 2.4 MG/DL (ref 1.7–2.3)
MCH RBC QN AUTO: 32 PG (ref 26.5–33)
MCH RBC QN AUTO: 33 PG (ref 26.5–33)
MCHC RBC AUTO-ENTMCNC: 33 G/DL (ref 31.5–36.5)
MCHC RBC AUTO-ENTMCNC: 33.5 G/DL (ref 31.5–36.5)
MCV RBC AUTO: 97 FL (ref 78–100)
MCV RBC AUTO: 99 FL (ref 78–100)
PHOSPHATE SERPL-MCNC: 7.3 MG/DL (ref 2.5–4.5)
PLATELET # BLD AUTO: 134 10E3/UL (ref 150–450)
PLATELET # BLD AUTO: 137 10E3/UL (ref 150–450)
POTASSIUM SERPL-SCNC: 4.1 MMOL/L (ref 3.4–5.3)
POTASSIUM SERPL-SCNC: 4.2 MMOL/L (ref 3.4–5.3)
PROT SERPL-MCNC: 5.1 G/DL (ref 6.4–8.3)
RBC # BLD AUTO: 2.21 10E6/UL (ref 4.4–5.9)
RBC # BLD AUTO: 2.22 10E6/UL (ref 4.4–5.9)
SODIUM SERPL-SCNC: 135 MMOL/L (ref 135–145)
SODIUM SERPL-SCNC: 136 MMOL/L (ref 135–145)
UFH PPP CHRO-ACNC: 0.18 IU/ML
UFH PPP CHRO-ACNC: 0.19 IU/ML
UFH PPP CHRO-ACNC: 0.26 IU/ML
UFH PPP CHRO-ACNC: 0.36 IU/ML
WBC # BLD AUTO: 12.5 10E3/UL (ref 4–11)
WBC # BLD AUTO: 13.8 10E3/UL (ref 4–11)

## 2023-10-08 PROCEDURE — 250N000011 HC RX IP 250 OP 636: Mod: JZ

## 2023-10-08 PROCEDURE — 99233 SBSQ HOSP IP/OBS HIGH 50: CPT | Performed by: INTERNAL MEDICINE

## 2023-10-08 PROCEDURE — 85610 PROTHROMBIN TIME: CPT

## 2023-10-08 PROCEDURE — 258N000003 HC RX IP 258 OP 636: Performed by: INTERNAL MEDICINE

## 2023-10-08 PROCEDURE — 82330 ASSAY OF CALCIUM: CPT | Performed by: PHYSICIAN ASSISTANT

## 2023-10-08 PROCEDURE — 85014 HEMATOCRIT: CPT

## 2023-10-08 PROCEDURE — 85730 THROMBOPLASTIN TIME PARTIAL: CPT

## 2023-10-08 PROCEDURE — 82248 BILIRUBIN DIRECT: CPT

## 2023-10-08 PROCEDURE — 85027 COMPLETE CBC AUTOMATED: CPT

## 2023-10-08 PROCEDURE — 250N000011 HC RX IP 250 OP 636: Performed by: STUDENT IN AN ORGANIZED HEALTH CARE EDUCATION/TRAINING PROGRAM

## 2023-10-08 PROCEDURE — 83605 ASSAY OF LACTIC ACID: CPT | Performed by: STUDENT IN AN ORGANIZED HEALTH CARE EDUCATION/TRAINING PROGRAM

## 2023-10-08 PROCEDURE — 250N000013 HC RX MED GY IP 250 OP 250 PS 637: Performed by: ANESTHESIOLOGY

## 2023-10-08 PROCEDURE — P9045 ALBUMIN (HUMAN), 5%, 250 ML: HCPCS | Mod: JZ

## 2023-10-08 PROCEDURE — 200N000002 HC R&B ICU UMMC

## 2023-10-08 PROCEDURE — 250N000013 HC RX MED GY IP 250 OP 250 PS 637: Performed by: SURGERY

## 2023-10-08 PROCEDURE — 250N000013 HC RX MED GY IP 250 OP 250 PS 637

## 2023-10-08 PROCEDURE — 250N000013 HC RX MED GY IP 250 OP 250 PS 637: Performed by: PHARMACIST

## 2023-10-08 PROCEDURE — 83605 ASSAY OF LACTIC ACID: CPT | Performed by: PHYSICIAN ASSISTANT

## 2023-10-08 PROCEDURE — 85520 HEPARIN ASSAY: CPT

## 2023-10-08 PROCEDURE — 74018 RADEX ABDOMEN 1 VIEW: CPT | Mod: 26 | Performed by: RADIOLOGY

## 2023-10-08 PROCEDURE — 93005 ELECTROCARDIOGRAM TRACING: CPT

## 2023-10-08 PROCEDURE — 83735 ASSAY OF MAGNESIUM: CPT | Performed by: PHYSICIAN ASSISTANT

## 2023-10-08 PROCEDURE — 99291 CRITICAL CARE FIRST HOUR: CPT | Mod: GC | Performed by: ANESTHESIOLOGY

## 2023-10-08 PROCEDURE — 85520 HEPARIN ASSAY: CPT | Performed by: SURGERY

## 2023-10-08 PROCEDURE — 90937 HEMODIALYSIS REPEATED EVAL: CPT

## 2023-10-08 PROCEDURE — 74018 RADEX ABDOMEN 1 VIEW: CPT

## 2023-10-08 PROCEDURE — 84100 ASSAY OF PHOSPHORUS: CPT | Performed by: PHYSICIAN ASSISTANT

## 2023-10-08 PROCEDURE — 93010 ELECTROCARDIOGRAM REPORT: CPT | Performed by: INTERNAL MEDICINE

## 2023-10-08 PROCEDURE — 80048 BASIC METABOLIC PNL TOTAL CA: CPT

## 2023-10-08 RX ORDER — HEPARIN SODIUM 10000 [USP'U]/100ML
0-5000 INJECTION, SOLUTION INTRAVENOUS CONTINUOUS
Status: DISPENSED | OUTPATIENT
Start: 2023-10-08 | End: 2023-10-15

## 2023-10-08 RX ORDER — ATORVASTATIN CALCIUM 10 MG/1
10 TABLET, FILM COATED ORAL EVERY EVENING
Status: DISCONTINUED | OUTPATIENT
Start: 2023-10-08 | End: 2023-11-14

## 2023-10-08 RX ORDER — OXYCODONE HYDROCHLORIDE 5 MG/1
5-10 TABLET ORAL EVERY 4 HOURS PRN
Status: DISCONTINUED | OUTPATIENT
Start: 2023-10-08 | End: 2023-11-17 | Stop reason: HOSPADM

## 2023-10-08 RX ORDER — GABAPENTIN 300 MG/1
300 CAPSULE ORAL AT BEDTIME
Status: DISCONTINUED | OUTPATIENT
Start: 2023-10-08 | End: 2023-10-18

## 2023-10-08 RX ADMIN — MIDODRINE HYDROCHLORIDE 20 MG: 5 TABLET ORAL at 10:06

## 2023-10-08 RX ADMIN — Medication 10 ML: at 08:09

## 2023-10-08 RX ADMIN — ACETAMINOPHEN 975 MG: 325 TABLET, FILM COATED ORAL at 05:12

## 2023-10-08 RX ADMIN — Medication 5 MG: at 20:33

## 2023-10-08 RX ADMIN — ATORVASTATIN CALCIUM 10 MG: 10 TABLET, FILM COATED ORAL at 20:34

## 2023-10-08 RX ADMIN — SODIUM CHLORIDE 300 ML: 9 INJECTION, SOLUTION INTRAVENOUS at 10:31

## 2023-10-08 RX ADMIN — ALBUMIN HUMAN 12.5 G: 0.05 INJECTION, SOLUTION INTRAVENOUS at 00:33

## 2023-10-08 RX ADMIN — NYSTATIN 500000 UNITS: 100000 SUSPENSION ORAL at 08:08

## 2023-10-08 RX ADMIN — Medication: at 10:31

## 2023-10-08 RX ADMIN — HEPARIN SODIUM 1750 UNITS/HR: 10000 INJECTION, SOLUTION INTRAVENOUS at 10:15

## 2023-10-08 RX ADMIN — NYSTATIN 500000 UNITS: 100000 SUSPENSION ORAL at 19:31

## 2023-10-08 RX ADMIN — Medication 12.5 MG: at 21:36

## 2023-10-08 RX ADMIN — ONDANSETRON 4 MG: 2 INJECTION INTRAMUSCULAR; INTRAVENOUS at 16:27

## 2023-10-08 RX ADMIN — HEPARIN SODIUM 1900 UNITS/HR: 10000 INJECTION, SOLUTION INTRAVENOUS at 20:48

## 2023-10-08 RX ADMIN — GABAPENTIN 300 MG: 300 CAPSULE ORAL at 21:36

## 2023-10-08 RX ADMIN — ACETAMINOPHEN 975 MG: 325 TABLET, FILM COATED ORAL at 11:29

## 2023-10-08 RX ADMIN — ACETAMINOPHEN 975 MG: 325 TABLET, FILM COATED ORAL at 20:34

## 2023-10-08 RX ADMIN — ACETAMINOPHEN 975 MG: 325 TABLET, FILM COATED ORAL at 00:17

## 2023-10-08 RX ADMIN — SODIUM CHLORIDE 250 ML: 9 INJECTION, SOLUTION INTRAVENOUS at 10:30

## 2023-10-08 RX ADMIN — INSULIN ASPART 1 UNITS: 100 INJECTION, SOLUTION INTRAVENOUS; SUBCUTANEOUS at 19:51

## 2023-10-08 RX ADMIN — INSULIN ASPART 2 UNITS: 100 INJECTION, SOLUTION INTRAVENOUS; SUBCUTANEOUS at 11:34

## 2023-10-08 RX ADMIN — NYSTATIN 500000 UNITS: 100000 SUSPENSION ORAL at 11:30

## 2023-10-08 RX ADMIN — PROCHLORPERAZINE EDISYLATE 5 MG: 5 INJECTION INTRAMUSCULAR; INTRAVENOUS at 19:39

## 2023-10-08 ASSESSMENT — ACTIVITIES OF DAILY LIVING (ADL)
ADLS_ACUITY_SCORE: 47

## 2023-10-08 ASSESSMENT — VISUAL ACUITY
OU: NORMAL ACUITY
OU: NORMAL ACUITY

## 2023-10-08 NOTE — PROGRESS NOTES
SURGICAL ICU PROGRESS NOTE  10/08/2023        Date of Service (when I saw the patient): 10/08/2023    ASSESSMENT:   Alfredo Burnham is a 68 year old male who was admitted to the SICU on 9/24/2023 s/p open AAA repair. Patient has a history of HTN and previous TIA. He presented to the ED on 9/24 with right sided abdominal pain and was found to have a contained, ruptured AAA on CT. 30 min super-celiac clamp time. Prolonged intra-renal clamp. 9/25 s/p flex sig showing rectal ischemia, abdominal washout showing a hemostatic AAA graft and no evidence of transmural colonic or small bowel ischemia, and an unsuccessful LLE embolectomy. 9/26 s/p repeat abdominal washout, retroperitoneal closure, LLE wound washout. 9/29 s/p repeat abd washout, bowel appeared well perfused w/o notable ischemia. OR 10/2 for abdominal fascia closure. Extubated 10/4.     CHANGES and MAJOR THINGS TODAY:   -iHD today per nephrology  -Continue high dose heparin   -ADDIS on Monday  -change oxy 10 to 5-10 mg prn    PLAN:    Neurological:  # Acute pain   - Multi-modal pain control (oxycodone PRN, dilaudid PRN, scheduled tylenol, robaxin PRN)   - RAPS consult for block pre-op for amputation on LLE    #Facial droop, left lacunar infarct  #Punctate lesions of cerebellum   -TTE unremarkable for emboli, ADDIS with bubble study to be completed Monday  -high dose heparin     # Delirium, CAM-ICU score=3 (delirium present)  # Mixed metabolic encephalopathy   # Sedation, RASS goal 0 to -1  - Monitor neurological status. Delirium preventions and precautions  - Melatonin q6pm and seroquel for sleep aid  - Atarax PRN for anxiety   - Precedex gtt discontinued    Pulmonary:  # Acute hypoxic respiratory failure  # s/p extubation 10/3  - Nasal cannula @ 2 LPM; continue to wean down as able   - Head of bed elevation     Cardiovascular:    # Contained AAA rupture s/p open repair  # Suspected emboli to LLE  # Hx of HTN  # Sinus tachycardia  # Ischemic LLE  - Continue to  monitor hemodynamic status.   - Goal MAP >65, SBP <160  - L AKA planned for early next week  -Lipitor 40, metoprolol 12.5 BID  -ADDIS Monday given unremarkable TTE to assess for source of emboli   - Will need to hold tube feeds prior to ADDIS    GI/Nutrition:    # s/p open AAA repair, abthera device in place  # Post-operative melena, resolved  # Rectal ischemia, concern for, resolved  - Abdomen closed  - NJ feeds at goal  - Continue bowel regimen  - Ok for ice chips by mouth    Renal/ Fluids/Electrolytes:  # MAYA  # Oliguria  - Dialysis management per nephrology, iHD  - Daily straight cath    Endocrine:  # Stress hyperglycemia  - Q6 BG checks, continue high dose sliding scale insulin.   - BG in the 140s over last 24h, no changes to regimen needed    Infectious disease:   Afebrile. White count downtrending, 12.5 today  S/p cefepime, vancomycin, metronidazole, and voriconazole for positive BAL (aspergillus and candida) and fever of unknown origin (ultimately believed to be side effect of precedex)  - Nystatin for thrush     Hematology:    # Acute blood loss anemia  # Thrombocytopenia, multifactorial  # LLE ischemia/ DVT  #PE  Platelet count recovering s/p 4-day course of steroids. Low risk of HIT per 4T score.  - High dose heparin  - Hydrocortisone taper completed 10/5  - Considered CT CAP per neuro crit recommendation to assess for malignancy, given recent CT CAP 9/24 which showed no malignancy, likely minimal utility. Infarcts resemble microemboli of atherosclerotic origin which may be secondary to AAA repair.       Musculoskeletal:  # Weakness and deconditioning of critical illness  # Left lower limb ischemia   - Passive ROM at bedside with RN  - Likely will return to OR with vascular 10/10 for LLE amputation; planning for block prior to surgery but RAPS with eval d/t new heparin gtt  - Physical therapy consult   - Podiatry consult for nail clipping     General Cares/Prophylaxis:    DVT Prophylaxis: Heparin SQ and  Pneumatic Compression Devices  GI Prophylaxis: None  Restraints: Restraints for medical healing needed: YES    Lines/ tubes/ drains:  - PIV x3  - RIJ Dialysis line  - R radial arterial line  - VAC on abdomen and L leg    Disposition:  - Surgical ICU    Patient seen, findings and plan discussed with surgical ICU staff, Dr. Narvaez.      Oralia AGRAWALRadha Rivera, MS4  Surgical ICU    ====================================  INTERVAL HISTORY:   Overnight the patient was hypertensive. Given his thalamic infarcts, an MR brain was completed and showed numerous, punctate late hyperacute to acute infarction. Also, a US of the LLE showed nonocclusive to occlusive thrombus of the left great saphenous vein from knee to groin, left distal femoral vein and popliteal veins with thrombus increased from 9/30. A TTE showed preserved LV systolic function with evidence of intravascular depletion.     ROS unable to be performed due to sedation and delirium.    OBJECTIVE:   1. VITAL SIGNS:   Temp:  [97.9  F (36.6  C)-100.1  F (37.8  C)] 99.2  F (37.3  C)  Pulse:  [] 110  Resp:  [12-21] 19  MAP:  [54 mmHg-277 mmHg] 86 mmHg  Arterial Line BP: ()/(43-68) 151/62  SpO2:  [91 %-98 %] 92 %  Resp: 19      2. INTAKE/ OUTPUT:   I/O last 3 completed shifts:  In: 1820.53 [I.V.:630.53; NG/GT:420]  Out: 500 [Stool:500]    3. PHYSICAL EXAMINATION:  General: Nasal cannula, lightly sedated. Can squeeze right fingers on command and answer simple yes/no questions. Speech difficult to understand.  HEENT: Normocephalic, atraumatic. RIJ dialysis line  Neuro: Moving all extremities spontaneously  Pulm/Resp: Clear lung fields  CV: Regular rhythm, occasionally mildly tachycardic   Abdomen: Fascia closed with VAC in place, serosanguinous output, soft, mildly distended, soft  :  deferred  MSK/Extremities: RLE warm to palpation with multiphasic DP, edematous. LLE cool to touch, dusky, no signals distal of popliteal. VAC on LLE fasciotomy sites    4.  INVESTIGATIONS:   Arterial Blood Gases   Recent Labs   Lab 10/03/23  2043   PH 7.44   PCO2 31*   PO2 61*   HCO3 21       Complete Blood Count   Recent Labs   Lab 10/08/23  0510 10/08/23  0005 10/07/23  0838 10/07/23  0400   WBC 12.5* 13.8* 13.2* 13.9*   HGB 7.1* 7.3* 7.8* 7.7*   * 134* 119* 133*       Basic Metabolic Panel  Recent Labs   Lab 10/08/23  0510 10/08/23  0303 10/08/23  0005 10/07/23  2343 10/07/23  0903 10/07/23  0428 10/06/23  2349 10/06/23  2155     --  135  --   --  137  --  138   POTASSIUM 4.1  --  4.2  --   --  4.2  --  4.2   CHLORIDE 95*  --  97*  --   --  97*  --  98   CO2 22  --  22  --   --  23  --  24   .0*  --  117.0*  --   --  91.5*  --  85.2*   CR 5.08*  --  4.87*  --   --  3.51*  --  3.15*   * 136* 152* 140*   < > 145*   < > 124*    < > = values in this interval not displayed.       Liver Function Tests  Recent Labs   Lab 10/08/23  0510 10/08/23  0005 10/07/23  0428 10/06/23  2155 10/06/23  0330 10/05/23  0349 10/02/23  1435 10/02/23  0305   AST 36  --  34  --  40 54*   < > 66*   ALT 23  --  22  --  28 31   < > 25   ALKPHOS 193*  --  203*  --  253* 252*   < > 127   BILITOTAL 0.4  --  0.5  --  0.4 0.6   < > 1.8*   ALBUMIN 2.6*  --  2.5*  --  2.4* 2.6*   < > 2.5*  2.5*   INR  --  1.13  --  1.19*  --   --   --  1.30*    < > = values in this interval not displayed.       Pancreatic Enzymes  No lab results found in last 7 days.  Coagulation Profile  Recent Labs   Lab 10/08/23  0005 10/06/23  2155 10/02/23  0305   INR 1.13 1.19* 1.30*   PTT 81* 31  --            5. RADIOLOGY:   Recent Results (from the past 24 hour(s))   POC US Echo Limited    Narrative    LIMITED POINT OF CARE CARDIAC AND ABDOMINAL ULTRASOUND EXAM:  Performed and interpreted by me.   Indication: Chest Pain and Hypotension/shock.  Parasternal long axis, parasternal short axis, apical 4 chamber, and transhepatic views were acquired.  Image quality was satisfactory; representative images were saved and  uploaded to PACS.    Findings:    Globally, LV function appeared normal.  I am not competent to confidently assess regional wall motion abnormalities, but I did not appreciate any significant regional differences in contractility.  RV function was normal, based on TAPSE of >1.8cm and S' (tissue doppler) >10.  Valves were not formally assessed for degree of regurgitation or stenosis, but by color doppler and B mode, I did not not notice anything other than mild sclerosis.  There was a trace pericardial effusion present, but no evidence of tamponade physiology.  In terms of an assessment of end-organ congestion and intra-vascular volume status, I felt the patient demonstrated hypovolemia based on the following: -No significant portal venous pulsatility; -A baseline IVC diameter <1.5cm that collapsed with inspiration (ie, good venous compliance);  -No evidence of elevated filling pressures on diastology (eg, normal E/A; average E/e' less than 14; a peak tricuspid regurgitant velocity less than 280cm/s);     Impression:  Preserved LV systolic function, but sonographic evidence of intravascular depletion and no signs of RV strain or elevated PA pressures in the context of his known PE.     SHAMEKA Narvaez MD  Clinical   Anesthesia / Critical Care  *[id number]        Lower Extremity Venous Duplex Bilateral   Result Value    Radiologist flags DVT (Urgent)    Narrative    EXAMINATION: DOPPLER VENOUS ULTRASOUND OF BILATERAL LOWER EXTREMITIES,  10/7/2023 10:54 AM     COMPARISON: Lower extremity venous duplex ultrasound 9/30/2023    HISTORY: New PE    TECHNIQUE:  Gray-scale evaluation with compression, spectral flow and  color Doppler assessment of the deep venous system of both legs from  groin to knee, and then at the ankles.    FINDINGS:  In the right lower extremity, the common femoral, femoral, popliteal,  peroneal, and posterior tibial veins demonstrate normal  compressibility and blood flow.    In the  left lower extremity, there is normal compressibility and blood  flow within the left common femoral vein. There is echogenic  intraluminal material and noncompressibility of the left great  saphenous extending from the level of the knee (partially compressible  at the knee and mid thigh with small amount of flow on color Doppler)  to the groin (noncompressible), coming within 3 mm of the FV-GSV  junction. Additionally, there is echogenic intraluminal material with  partial compressibility of the femoral vein in the distal thigh and  popliteal vein with flow defect on color Doppler. Unable to evaluate  below the knee due to overlying bandaging.      Impression    IMPRESSION:  1. Nonocclusive to occlusive thrombus of the left great saphenous vein  from the level of the knee to the groin.  2. Nonocclusive thrombus of the left distal femoral vein and popliteal  veins, increased in extent compared to 9/30/2023.  3. No right lower extremity deep venous thrombosis.    [Urgent Result: DVT]    Finding was identified on 10/7/2023 10:55 AM.     Dr. Niño was contacted by Dr. Paula at 10/7/2023 11:09 AM and  verbalized understanding of the urgent finding.      I have personally reviewed the examination and initial interpretation  and I agree with the findings.    SABINO MURRIETA,          SYSTEM ID:  [id number]   MR Brain w/o Contrast    Narrative    MRI brain without contrast    Provided History:  concern for for thalamic stroke on CT.    Comparison:  10/7/2023 and 10/6/2023 head CT, 10/6/2023 CTA and  10/6/2023 CT perfusion      Technique:     Axial and coronal diffusion-weighted (with ADC map), axial FLAIR, and  axial susceptibility-weighted images of the brain were obtained  without the administration of intravenous contrast.    Findings:   Multiple punctate areas of reduced diffusion with superimposed T2  hyperintense signal within the left cerebellar hemisphere, cerebellar  vermis, left occipital lobe, bilateral  thalami, anterior right  putamen, left periventricular white matter and in the subcortical  white matter of the left frontal lobe. There are no areas of  hemorrhagic conversion or intracranial hemorrhage on the GRE  sequence..      Impression    Impression:    Numerous, punctate late hyperacute to acute infarctions are concerning  for an embolic etiology. No evidence of hemorrhagic conversion or  intracranial hemorrhage otherwise.    I have personally reviewed the examination and initial interpretation  and I agree with the findings.    KEY VASQUEZ MD         SYSTEM ID:  [id number]     I saw and evaluated the patient in an independent visit and agree with the student's assessment and plan as written above. I was present at all times for any mentioned procedures.     Clint Braga MD  PGY-1, Neurosurgery  Off-service on SICU

## 2023-10-08 NOTE — PROGRESS NOTES
"Nephrology Progress Note  10/08/2023       Alfredo Burnham is a 68 yom with complex hx of TIA, HTN, blindness who presented to [facility] 9/24 with severe abdominal pain radiating to flank and back, CT revealed AAA with a contained rupture.  Taken to OR for aortobiiliac bypass and resection of ruptured pararenal aneurysm.   Post op course complicated by hypotension requiring pressors and metabolic acidosis.  Baseline Cr 1.0 on presentation but on the rise to >5 at time of renal consult for MAYA management and possible RRT.       Interval History :   Mr Burnham tolerated session of conventional dialysis yesterday and had a UF of 2 L.  His obligatory intakes have improved further.     Assessment & Recommendations:   MAYA-Baseline Cr 1.0, ordered UA.  CT showed benign cyst but otherwise normal kidneys.  Cr on the rise since surgery, did receive contrast for CTA.  Urine microscopy showed granular casts suggesting ATN which will recover with time and stabilizing hemodynamics.  Started on CRRT 9/30 for volume and clearance with minimal UOP and rising Cr.                  -Started CRRT 9/30 for volume and clearance, stopped 10/4 and now running iHD PRN and watching for recovery.     -patient had HD today with 2 L UF  --will plan on another run of HD on Monday 10/9/23     Volume-Total body volume overloaded but much of it is likely 3rd spaced with low albumin and acute illness  Plan for HD again  tomorrow for volume optimization.     Maria Isabel Oconnor MD      Review of Systems:   I reviewed the following systems:  ROS not done due to lethargy    Physical Exam:   I/O last 3 completed shifts:  In: 1888.08 [I.V.:668.08; NG/GT:450]  Out: 2300 [Other:2000; Stool:300]   /82   Pulse 110   Temp 99  F (37.2  C) (Axillary)   Resp 16   Ht 1.727 m (5' 8\")   Wt 88.6 kg (195 lb 5.2 oz)   SpO2 100%   BMI 29.70 kg/m       GENERAL APPEARANCE: Extubated, lethargic, in no distress.   EYES: No scleral icterus  Pulmonary: On vent "   CV: Regular rhythm, normal rate   - Edema +2-3 generalized, ++ scrotal edema.    GI: distended, nontender  MS: no evidence of inflammation in joints, no muscle tenderness  : No Lu  SKIN: no rash, warm, dry  NEURO: No focal deficits.         Labs:   All labs reviewed by me  Electrolytes/Renal -   Recent Labs   Lab Test 10/08/23  1623 10/08/23  1134 10/08/23  0811 10/08/23  0510 10/08/23  0303 10/08/23  0005 10/07/23  0903 10/07/23  0428 10/06/23  1116 10/06/23  1115   NA  --   --   --  136  --  135  --  137   < > 138   POTASSIUM  --   --   --  4.1  --  4.2  --  4.2   < > 5.6*   CHLORIDE  --   --   --  95*  --  97*  --  97*   < > 103   CO2  --   --   --  22  --  22  --  23   < > 15*   BUN  --   --   --  116.0*  --  117.0*  --  91.5*   < > 136.0*   CR  --   --   --  5.08*  --  4.87*  --  3.51*   < > 4.65*   * 170* 133* 145*   < > 152*   < > 145*   < > 178*   OMAR  --   --   --  8.1*  --  8.0*  --  8.0*   < > 8.0*   MAG  --   --   --  2.4*  --   --   --  2.1  --  2.6*   PHOS  --   --   --  7.3*  --   --   --  5.6*  --  7.1*    < > = values in this interval not displayed.         CBC -   Recent Labs   Lab Test 10/08/23  0510 10/08/23  0005 10/07/23  0838   WBC 12.5* 13.8* 13.2*   HGB 7.1* 7.3* 7.8*   * 134* 119*         LFTs -   Recent Labs   Lab Test 10/08/23  0510 10/07/23  0428 10/06/23  0330   ALKPHOS 193* 203* 253*   BILITOTAL 0.4 0.5 0.4   ALT 23 22 28   AST 36 34 40   PROTTOTAL 5.1* 4.9* 4.9*   ALBUMIN 2.6* 2.5* 2.4*         Iron Panel - No lab results found.        Current Medications:   acetaminophen  975 mg Oral or Feeding Tube Q6H    atorvastatin  10 mg Oral or Feeding Tube QPM    B and C vitamin Complex with folic acid  10 mL Per Feeding Tube Daily    gabapentin  300 mg Oral or Feeding Tube At Bedtime    insulin aspart  1-12 Units Subcutaneous Q4H    melatonin  5 mg Oral or Feeding Tube QPM    [Held by provider] metoprolol tartrate  12.5 mg Oral or Feeding Tube BID    nystatin  500,000  Units Oral 4x Daily    polyethylene glycol  17 g Oral or Feeding Tube Daily    protein modular  1 packet Per Feeding Tube TID    QUEtiapine  25 mg Oral or Feeding Tube At Bedtime    senna-docusate  1 tablet Oral or Feeding Tube BID    sodium chloride (PF)  3 mL Intracatheter Q8H      dextrose      dextrose      heparin 1,750 Units/hr (10/08/23 1500)    norepinephrine Stopped (10/08/23 1050)

## 2023-10-08 NOTE — PROGRESS NOTES
Date: 10/8/2023  Time: 2:28 PM     Data:  Pre Wt:   88.6 kg  Desired Wt:   To be established  Post Wt:  86.6 kg (estimated)  Weight change: -  2.0 kg  Ultrafiltration - Post Run Net Total Removed (mL):  2000 ml  Vascular Access Status: CVC patent  Dialyzer Rinse:  Light  Total Blood Volume Processed: 80.2 L   Total Dialysis (Treatment) Time:   3.5 Hrs  Dialysate Bath: K 3, Ca 3  Heparin: Heparin: None     Lab:   No     Interventions:  Dialysis done through Right IJ CVC  UF initially set to 3.3 Liters of fluid removal, accommodating priming and rinse back volumes. 15 minutes upon starting, BP dropped from 160s to 80s. PCN administered pressors for BP support, and had to stop pulling fluid for a while until vitally stable. Resumed UF pull per hemodynamics, goal decreased to 2 L (net) monitoring closely.  ,   PCN administered pre-HD Midodrine 20 mg per MAR  CritLine showed a steep Profile C in the first 1.5 hours and a steady Profile A throughout the run  Catheter lumens locked with saline, catheter caps (ClearGuard ) changed post HD  Report given to PCN, left in his room in stable condition     Assessment:  Alert/lethargic, calm & cooperative  Lung sounds diminished anterior and lateral BUL/ BLL  CVC intact, previous dressing clean and dry                Plan:    Per Renal team        JUSTIN SmithN, RN  Acute Dialysis RN  [facility]

## 2023-10-08 NOTE — PROGRESS NOTES
STAFF NOTE:  No major issues yesterday other than we started heparin gtt for the incidentally-found PE    Abdominal wound site looks fine (wound vac in place without purulent output)  Vascular access sites look fine; has a RIJ dialysis line and a left subclavian CVC  Blas gangrene of LLE.  I still don't see obvious signs of concurrent infection.    a wound vac on the calf has normal output  Scrotal edema persists    Labs notable for BUN now 116, but potassium is still ok  Rest of lytes are fine  Total cholesterol on tube feeds was 87; LDL was only 32 even on tube feeds    BLE doppler showed thrombus extending from L sapthenous to groin    This is a 68M hx HTN, TIA, s/p open contained AAA rupture s/p repair with prolonged suprarenal and juxtarenal clamp.  Also had an unsuccessfull LLE embolectomy without perfusion to the MARY JANE from the popliteal downward, now awaiting amputation.  He has incidentally-found tiny embolic strokes and a incidental PE for which he is being therapeutically anticoagulated.    Tonight his tube feeds will be held in anticipation of ADDIS with bubble study tomorrow.  We are watching for possible narcotic withdrawal given the days of high-dose fentanyl.    Will decrease his lipitor to 10mg given low LDL (he did not have his tube feeds held, so if anything, this is an over-estimation of his fasted cholesterol).    METABOLIC ENCEPHALOPATHY / DELIRIUM:  -melatonin + seroquel for sleep   -I suppose it's possible that hypotension could result in recrudescence of old strokes seen on MRI, but the new punctate stuff, potentially representing microemboli from AAA repair, is unlikely to be contributing at all to his current condition    CHRONIC STROKE:  -long term target MAP goals will be <140mmHg  -etiology likely embolic.  -ADDIS tomorrow with bubble study to rule out paradoxical embolism in the context of DVT  -no further need for cross-sectional abdominal imaging to search for malignancy - the CTA that we  had is adequate to rule out large tumor  -ASA   -lipitor, but doesn't need to be high intensity given low LDL    Hx HTN:  -metoprolol for tachycardia + HTN can be held today before CRRT    HLD:  -will decrease lipitor to 10mg given LDL was <70, to avoid myalgia issues especially in the cotnedt of his gangrenous leg     ISCHEMIC LLE:  -s/p fasciotomy.  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  above-knee amputation planned for Tuesday.    -gabapentin 100tid for neuropathic pain  -now that he requires therapeutic anticoagulation for PE / DVT, he will no longer be a candidate for epidural.  Potentially Acute Pain Service would still be willing to offer at least a single-shot femoral / popliteal nerve block    THROMBOCYTOPENIA:  -resolved    DVT / INCIDENTAL PE:  -therapeutic heparin; will eventually need to be started on a DOAC (or warfarin given renal failure)    ABDOMINAL AORTIC RUPTURE:  -s/p open repair  -abdomen closed  -IV & enteral narcotics available for analgesia.    -metoprolol for HTN; hold before IHD    SEVERE PROTEIN-CALORIE MALNUTRITION:  -post-pyloric tube feeds are Pivot 1.5 at 50ml/h as goal   -may need PEG before discharge    DIARRHEA:  -seems to be getting better; at least better controlled by rectal pouch    ACUTE KIDNEY FAILURE:  -contributing factors are massive hemorrhage, prolonged perirenal aortic clamp, possible ongoing infection / inflammation from the gangrenous leg  -Plan IHD today; based on yesterday's echo, should not need significant ultrafiltration     MISC:  -Code status is full code  -family updated at bedside  -therapeutic heparin infusion; PPI not necessary  -lines: RIJ dialysis line; L subclavian 3 lumen   -aparicio not necessary given anuria.    -anticipate discharge to transitional care unit in >1 week, and ultimate discharge to skilled nursing.  Amputation will be Tuesday; since didn't tolerate IHD without pressors, will keep here in unit probably until  then, and I anticipate will eventually need PEG tube before discharge.    Billing statement: 31min of critical care time; spent in an initial review of imaging, labs, physical exam, and discussion of the patient with my own team and the extended care team including the primary service; and including family conference (the patient is unable to participate in this discussion because of current neurologic status), where we discussed my recommendations for medical management given my assessment of the patient's prognosis as described above.  Based on this patient's presentation / recent intervention and my bedside assessment, I felt there was or is a reasonably high probability of imminent or life-threatening deterioration today or tonight for hemorrhagic or neurologic reasons.   My overall critical care time, as described in detail above, includes such things as coordination of care, arrhythmia and hemodynamics management with infusions of medicines, respiratory management, fluid therapy including fluid boluses, and pain and sedation therapy. This time excludes time I spent personally performing or supervising procedures for this patient.    SHAMEKA Narvaez MD  Clinical   Anesthesia / Critical Care  *[phone]

## 2023-10-08 NOTE — PLAN OF CARE
Goal Outcome Evaluation:    Major Shift Events:  Hypotensive episodes.  Neuro: Patient is alert to self and does follow commands and can move BUE and LRE.  Pupils reactive and anisocoria.  Patient is lift bound and deconditioned.   Cards: ST in 100s with PVCs, Restarted levo at 0000 for MAP of 50s, provider Dr. Niño was notified and stopped at 3am, labs were ordered and patient received albumin infusion.  Tmax of 100.1, patient was given scheduled tylenol.    Heparin gtt was increased to 1600units/hr and next 10a at 7am.    Resp; on nc 3L and sats in 90s, coarse LS, with productive cough, oral secution with thick tan secrections.    GI/: NPO with TF, Novasource via NJ  denies nausea, BS checks q4hrs with slinding scale coverage.  Patient is Oliguric, probably HD today 10/8/23, rectal pouch intact with green liquid  stool.    Access:   Piv X2, R-CVC for HD, L-subclavian triple lumen picc.   Skin: ABD wound vac, L-leg shin wound vac.  Scrotal edema, BUE and thighs.  Patient with blisters and easily, elevated R-leg off bed and mepilex applied to the heal of R-foot.    Plan: Will continue with cares and notify MD of Status changes.  For vital signs and complete assessments, please see documentation flowsheets.

## 2023-10-08 NOTE — PLAN OF CARE
Goal Outcome Evaluation:    Neuro: Alert to self, occasionally situation and time. Drowsy, arouses to voice/stimulation. Education to pt & family about staying awake during the day as much as possible. Whispers. Moves uppers, 3/5; wiggles R toes but no movement in L. L pupil still remains larger than R. R facial droop remains.  CV: HR 90-120s. SBP goal <160, MAP >65. Levo started during HD today, SICU notified of chest pain per pt. EKG completed.  Pulm: 5L NC, clear to coarse lungs.  GI: NJ, TF at goal. Loose stools. Rectal pouch.   : Oliguric. HD today.  Surg: Wound vacs in place.   IV/Gtt: R HD line. L subclavian. L art line.      Up to chair via lift today.     Will continue to monitor for safety and comfort.     Roro Olson RN

## 2023-10-08 NOTE — PROGRESS NOTES
Vascular Surgery Progress Note  10/08/2023       Subjective:  Brief episode of hypotension needing pressor overnight which improved with fluids. No significant complaints or issues this AM.    Objective:  Temp:  [97.9  F (36.6  C)-100.1  F (37.8  C)] 98.4  F (36.9  C)  Pulse:  [] 107  Resp:  [12-22] 16  BP: ()/(63-94) 162/94  MAP:  [48 mmHg-277 mmHg] 100 mmHg  Arterial Line BP: ()/(37-73) 158/73  SpO2:  [91 %-98 %] 96 %    I/O last 3 completed shifts:  In: 1820.53 [I.V.:630.53; NG/GT:420]  Out: 500 [Stool:500]      Gen: Wakes up to converse.  CV: RR per DP pulse, off pressors, sinus tach per tele with PACs  Resp: On NC, non-labored  Abd: Wound vac in place, holding seal, abdomen soft to palpation around VAC.    Ext: RLE wwp. LLE cool to touch, dusky, continues to have redness on dorsal aspect of foot. Blistering appreciated on proximal calf posteriorly. no DP/PT signals on LLE. VAC change today     Labs:  Recent Labs   Lab 10/08/23  0510 10/08/23  0005 10/07/23  0838   WBC 12.5* 13.8* 13.2*   HGB 7.1* 7.3* 7.8*   * 134* 119*         Recent Labs   Lab 10/08/23  0811 10/08/23  0510 10/08/23  0303 10/08/23  0005 10/07/23  0903 10/07/23  0428 10/06/23  1116 10/06/23  1115   NA  --  136  --  135  --  137   < > 138   POTASSIUM  --  4.1  --  4.2  --  4.2   < > 5.6*   CHLORIDE  --  95*  --  97*  --  97*   < > 103   CO2  --  22  --  22  --  23   < > 15*   BUN  --  116.0*  --  117.0*  --  91.5*   < > 136.0*   CR  --  5.08*  --  4.87*  --  3.51*   < > 4.65*   * 145* 136* 152*   < > 145*   < > 178*   OMAR  --  8.1*  --  8.0*  --  8.0*   < > 8.0*   MAG  --  2.4*  --   --   --  2.1  --  2.6*   PHOS  --  7.3*  --   --   --  5.6*  --  7.1*    < > = values in this interval not displayed.        Assessment/Plan:   68 year old male with PMH of HTN and previous TIA who presented with R sided abdominal pain found to have contained ruptured AAA, now s/p open AAA repair 9/24 into 9/25am with 30 min  supraceliac clamp time, prolonged inter-renal clamp time, temporary abdominal closure. He remains critically ill in the ICU. He did have bloody stool postop with flex sig 9/25 demonstrating ischemic mucosal changes of the rectum, however on repeated abdominal looks he has had no evidence of transmural necrosis of the small or large intestine, or the rectum. On 9/29 he was started on CRRT given ongoing low UOP and rising Cr and failure of lasix challenge. Hemodynamically continues to do well off pressors since 9/29/23. Ongoing absent signals with ischemic LLE, note DVT in this leg, this is to be expected given ischemia and absent inflow. We will continue to monitor LLE given it is not currently making patient systemically ill and there is no indication for urgent intervention. S/p closure of abdominal fascia and subcutaneous tissue left open with wound VAC applied 10/2/23. Plan for L AKA potentially next week on Tuesday.    - OR on Tuesday for LLE amputation  - Appreciate excellent ICU care.  - Stroke workup so far negative -- MRI with multiple small infarcts -- pending ADDIS tomorrow    - PE+ -- on hep gtt, pending venous duplex  - Goal SBP <160, MAP >65  - Tolerates TF at goal for x3 days, without residuals. Appreciate nutrition consult.  - Appreciate nephrology recommendations  - Plan for HD today   - Weaning IV steroids  - VAC change today by vascular surgery    Seen and d/w Dr. Lowe

## 2023-10-09 ENCOUNTER — APPOINTMENT (OUTPATIENT)
Dept: GENERAL RADIOLOGY | Facility: CLINIC | Age: 68
DRG: 268 | End: 2023-10-09
Payer: COMMERCIAL

## 2023-10-09 ENCOUNTER — APPOINTMENT (OUTPATIENT)
Dept: CARDIOLOGY | Facility: CLINIC | Age: 68
DRG: 268 | End: 2023-10-09
Payer: COMMERCIAL

## 2023-10-09 LAB
ALBUMIN SERPL BCG-MCNC: 2.5 G/DL (ref 3.5–5.2)
ALP SERPL-CCNC: 182 U/L (ref 40–129)
ALT SERPL W P-5'-P-CCNC: 33 U/L (ref 0–70)
ANION GAP SERPL CALCULATED.3IONS-SCNC: 15 MMOL/L (ref 7–15)
AST SERPL W P-5'-P-CCNC: 55 U/L (ref 0–45)
ATRIAL RATE - MUSE: 108 BPM
ATRIAL RATE - MUSE: 108 BPM
BILIRUB DIRECT SERPL-MCNC: 0.3 MG/DL (ref 0–0.3)
BILIRUB SERPL-MCNC: 0.5 MG/DL
BUN SERPL-MCNC: 69.2 MG/DL (ref 8–23)
CA-I BLD-MCNC: 4.4 MG/DL (ref 4.4–5.2)
CALCIUM SERPL-MCNC: 8.6 MG/DL (ref 8.8–10.2)
CHLORIDE SERPL-SCNC: 97 MMOL/L (ref 98–107)
CREAT SERPL-MCNC: 3.56 MG/DL (ref 0.67–1.17)
DEPRECATED HCO3 PLAS-SCNC: 23 MMOL/L (ref 22–29)
DIASTOLIC BLOOD PRESSURE - MUSE: NORMAL MMHG
DIASTOLIC BLOOD PRESSURE - MUSE: NORMAL MMHG
EGFRCR SERPLBLD CKD-EPI 2021: 18 ML/MIN/1.73M2
ERYTHROCYTE [DISTWIDTH] IN BLOOD BY AUTOMATED COUNT: 18.6 % (ref 10–15)
GLUCOSE BLDC GLUCOMTR-MCNC: 104 MG/DL (ref 70–99)
GLUCOSE BLDC GLUCOMTR-MCNC: 112 MG/DL (ref 70–99)
GLUCOSE BLDC GLUCOMTR-MCNC: 113 MG/DL (ref 70–99)
GLUCOSE BLDC GLUCOMTR-MCNC: 119 MG/DL (ref 70–99)
GLUCOSE BLDC GLUCOMTR-MCNC: 124 MG/DL (ref 70–99)
GLUCOSE BLDC GLUCOMTR-MCNC: 125 MG/DL (ref 70–99)
GLUCOSE SERPL-MCNC: 115 MG/DL (ref 70–99)
HCT VFR BLD AUTO: 22.9 % (ref 40–53)
HGB BLD-MCNC: 7.6 G/DL (ref 13.3–17.7)
INTERPRETATION ECG - MUSE: NORMAL
INTERPRETATION ECG - MUSE: NORMAL
LACTATE SERPL-SCNC: 0.6 MMOL/L (ref 0.7–2)
MAGNESIUM SERPL-MCNC: 1.9 MG/DL (ref 1.7–2.3)
MCH RBC QN AUTO: 32.5 PG (ref 26.5–33)
MCHC RBC AUTO-ENTMCNC: 33.2 G/DL (ref 31.5–36.5)
MCV RBC AUTO: 98 FL (ref 78–100)
P AXIS - MUSE: 42 DEGREES
P AXIS - MUSE: 45 DEGREES
PHOSPHATE SERPL-MCNC: 5.4 MG/DL (ref 2.5–4.5)
PLATELET # BLD AUTO: 169 10E3/UL (ref 150–450)
POTASSIUM SERPL-SCNC: 4 MMOL/L (ref 3.4–5.3)
PR INTERVAL - MUSE: 124 MS
PR INTERVAL - MUSE: 130 MS
PROT SERPL-MCNC: 5.5 G/DL (ref 6.4–8.3)
QRS DURATION - MUSE: 84 MS
QRS DURATION - MUSE: 88 MS
QT - MUSE: 334 MS
QT - MUSE: 338 MS
QTC - MUSE: 447 MS
QTC - MUSE: 452 MS
R AXIS - MUSE: 3 DEGREES
R AXIS - MUSE: 32 DEGREES
RBC # BLD AUTO: 2.34 10E6/UL (ref 4.4–5.9)
SODIUM SERPL-SCNC: 135 MMOL/L (ref 135–145)
SYSTOLIC BLOOD PRESSURE - MUSE: NORMAL MMHG
SYSTOLIC BLOOD PRESSURE - MUSE: NORMAL MMHG
T AXIS - MUSE: 41 DEGREES
T AXIS - MUSE: 57 DEGREES
UFH PPP CHRO-ACNC: 0.62 IU/ML
UFH PPP CHRO-ACNC: 0.7 IU/ML
VENTRICULAR RATE- MUSE: 108 BPM
VENTRICULAR RATE- MUSE: 108 BPM
WBC # BLD AUTO: 14.4 10E3/UL (ref 4–11)

## 2023-10-09 PROCEDURE — 99233 SBSQ HOSP IP/OBS HIGH 50: CPT | Mod: FS | Performed by: CLINICAL NURSE SPECIALIST

## 2023-10-09 PROCEDURE — 200N000002 HC R&B ICU UMMC

## 2023-10-09 PROCEDURE — 250N000011 HC RX IP 250 OP 636: Mod: JZ | Performed by: STUDENT IN AN ORGANIZED HEALTH CARE EDUCATION/TRAINING PROGRAM

## 2023-10-09 PROCEDURE — 71045 X-RAY EXAM CHEST 1 VIEW: CPT | Mod: 76

## 2023-10-09 PROCEDURE — 250N000013 HC RX MED GY IP 250 OP 250 PS 637

## 2023-10-09 PROCEDURE — G0463 HOSPITAL OUTPT CLINIC VISIT: HCPCS | Mod: 25

## 2023-10-09 PROCEDURE — 83605 ASSAY OF LACTIC ACID: CPT | Performed by: PHYSICIAN ASSISTANT

## 2023-10-09 PROCEDURE — 99291 CRITICAL CARE FIRST HOUR: CPT | Mod: GC | Performed by: SURGERY

## 2023-10-09 PROCEDURE — 999N000065 XR ABDOMEN PORT 1 VIEW

## 2023-10-09 PROCEDURE — 84100 ASSAY OF PHOSPHORUS: CPT | Performed by: PHYSICIAN ASSISTANT

## 2023-10-09 PROCEDURE — 83735 ASSAY OF MAGNESIUM: CPT | Performed by: PHYSICIAN ASSISTANT

## 2023-10-09 PROCEDURE — 250N000011 HC RX IP 250 OP 636

## 2023-10-09 PROCEDURE — 74018 RADEX ABDOMEN 1 VIEW: CPT | Mod: 26 | Performed by: RADIOLOGY

## 2023-10-09 PROCEDURE — 80053 COMPREHEN METABOLIC PANEL: CPT

## 2023-10-09 PROCEDURE — 71045 X-RAY EXAM CHEST 1 VIEW: CPT | Mod: 26 | Performed by: RADIOLOGY

## 2023-10-09 PROCEDURE — 71045 X-RAY EXAM CHEST 1 VIEW: CPT | Mod: 26 | Performed by: STUDENT IN AN ORGANIZED HEALTH CARE EDUCATION/TRAINING PROGRAM

## 2023-10-09 PROCEDURE — 250N000013 HC RX MED GY IP 250 OP 250 PS 637: Performed by: PHARMACIST

## 2023-10-09 PROCEDURE — 250N000013 HC RX MED GY IP 250 OP 250 PS 637: Performed by: SURGERY

## 2023-10-09 PROCEDURE — 82330 ASSAY OF CALCIUM: CPT | Performed by: PHYSICIAN ASSISTANT

## 2023-10-09 PROCEDURE — 250N000013 HC RX MED GY IP 250 OP 250 PS 637: Performed by: STUDENT IN AN ORGANIZED HEALTH CARE EDUCATION/TRAINING PROGRAM

## 2023-10-09 PROCEDURE — 87040 BLOOD CULTURE FOR BACTERIA: CPT | Performed by: SURGERY

## 2023-10-09 PROCEDURE — 93325 DOPPLER ECHO COLOR FLOW MAPG: CPT | Mod: 74

## 2023-10-09 PROCEDURE — 85520 HEPARIN ASSAY: CPT | Performed by: SURGERY

## 2023-10-09 PROCEDURE — 85027 COMPLETE CBC AUTOMATED: CPT

## 2023-10-09 PROCEDURE — 71045 X-RAY EXAM CHEST 1 VIEW: CPT

## 2023-10-09 PROCEDURE — 36415 COLL VENOUS BLD VENIPUNCTURE: CPT | Performed by: SURGERY

## 2023-10-09 RX ORDER — LABETALOL HYDROCHLORIDE 5 MG/ML
10 INJECTION, SOLUTION INTRAVENOUS
Status: CANCELLED | OUTPATIENT
Start: 2023-10-09

## 2023-10-09 RX ORDER — CEFAZOLIN SODIUM 2 G/100ML
2 INJECTION, SOLUTION INTRAVENOUS
Status: CANCELLED | OUTPATIENT
Start: 2023-10-09

## 2023-10-09 RX ORDER — CEFAZOLIN SODIUM 2 G/100ML
2 INJECTION, SOLUTION INTRAVENOUS SEE ADMIN INSTRUCTIONS
Status: CANCELLED | OUTPATIENT
Start: 2023-10-09

## 2023-10-09 RX ORDER — SODIUM CHLORIDE, SODIUM LACTATE, POTASSIUM CHLORIDE, CALCIUM CHLORIDE 600; 310; 30; 20 MG/100ML; MG/100ML; MG/100ML; MG/100ML
INJECTION, SOLUTION INTRAVENOUS CONTINUOUS
Status: CANCELLED | OUTPATIENT
Start: 2023-10-09

## 2023-10-09 RX ORDER — HYDRALAZINE HYDROCHLORIDE 20 MG/ML
2.5-5 INJECTION INTRAMUSCULAR; INTRAVENOUS EVERY 10 MIN PRN
Status: CANCELLED | OUTPATIENT
Start: 2023-10-09

## 2023-10-09 RX ORDER — METOCLOPRAMIDE HYDROCHLORIDE 5 MG/ML
10 INJECTION INTRAMUSCULAR; INTRAVENOUS ONCE
Status: COMPLETED | OUTPATIENT
Start: 2023-10-09 | End: 2023-10-09

## 2023-10-09 RX ORDER — FENTANYL CITRATE 50 UG/ML
50 INJECTION, SOLUTION INTRAMUSCULAR; INTRAVENOUS EVERY 5 MIN PRN
Status: CANCELLED | OUTPATIENT
Start: 2023-10-09

## 2023-10-09 RX ORDER — ONDANSETRON 2 MG/ML
4 INJECTION INTRAMUSCULAR; INTRAVENOUS EVERY 30 MIN PRN
Status: CANCELLED | OUTPATIENT
Start: 2023-10-09

## 2023-10-09 RX ORDER — HYDROMORPHONE HCL IN WATER/PF 6 MG/30 ML
0.4 PATIENT CONTROLLED ANALGESIA SYRINGE INTRAVENOUS EVERY 5 MIN PRN
Status: CANCELLED | OUTPATIENT
Start: 2023-10-09

## 2023-10-09 RX ORDER — LIDOCAINE HYDROCHLORIDE 20 MG/ML
JELLY TOPICAL ONCE
Status: COMPLETED | OUTPATIENT
Start: 2023-10-09 | End: 2023-10-09

## 2023-10-09 RX ORDER — HYDROMORPHONE HCL IN WATER/PF 6 MG/30 ML
0.2 PATIENT CONTROLLED ANALGESIA SYRINGE INTRAVENOUS EVERY 5 MIN PRN
Status: CANCELLED | OUTPATIENT
Start: 2023-10-09

## 2023-10-09 RX ORDER — LIDOCAINE 40 MG/G
CREAM TOPICAL
Status: CANCELLED | OUTPATIENT
Start: 2023-10-09

## 2023-10-09 RX ORDER — FENTANYL CITRATE 50 UG/ML
INJECTION, SOLUTION INTRAMUSCULAR; INTRAVENOUS
Status: DISCONTINUED
Start: 2023-10-09 | End: 2023-10-10 | Stop reason: HOSPADM

## 2023-10-09 RX ORDER — ONDANSETRON 4 MG/1
4 TABLET, ORALLY DISINTEGRATING ORAL EVERY 30 MIN PRN
Status: CANCELLED | OUTPATIENT
Start: 2023-10-09

## 2023-10-09 RX ORDER — FENTANYL CITRATE 50 UG/ML
25 INJECTION, SOLUTION INTRAMUSCULAR; INTRAVENOUS EVERY 5 MIN PRN
Status: CANCELLED | OUTPATIENT
Start: 2023-10-09

## 2023-10-09 RX ORDER — FENTANYL CITRATE 50 UG/ML
50 INJECTION, SOLUTION INTRAMUSCULAR; INTRAVENOUS ONCE
Status: COMPLETED | OUTPATIENT
Start: 2023-10-09 | End: 2023-10-09

## 2023-10-09 RX ADMIN — Medication 12.5 MG: at 21:56

## 2023-10-09 RX ADMIN — ONDANSETRON 4 MG: 2 INJECTION INTRAMUSCULAR; INTRAVENOUS at 03:43

## 2023-10-09 RX ADMIN — SENNOSIDES AND DOCUSATE SODIUM 1 TABLET: 50; 8.6 TABLET ORAL at 20:19

## 2023-10-09 RX ADMIN — NYSTATIN 500000 UNITS: 100000 SUSPENSION ORAL at 13:01

## 2023-10-09 RX ADMIN — ACETAMINOPHEN 975 MG: 325 TABLET, FILM COATED ORAL at 20:18

## 2023-10-09 RX ADMIN — METOCLOPRAMIDE 10 MG: 5 INJECTION, SOLUTION INTRAMUSCULAR; INTRAVENOUS at 13:54

## 2023-10-09 RX ADMIN — HYDROMORPHONE HYDROCHLORIDE 0.5 MG: 1 INJECTION, SOLUTION INTRAMUSCULAR; INTRAVENOUS; SUBCUTANEOUS at 13:35

## 2023-10-09 RX ADMIN — NYSTATIN 500000 UNITS: 100000 SUSPENSION ORAL at 07:55

## 2023-10-09 RX ADMIN — ONDANSETRON 4 MG: 2 INJECTION INTRAMUSCULAR; INTRAVENOUS at 11:38

## 2023-10-09 RX ADMIN — NYSTATIN 500000 UNITS: 100000 SUSPENSION ORAL at 20:18

## 2023-10-09 RX ADMIN — ACETAMINOPHEN 975 MG: 325 TABLET, FILM COATED ORAL at 07:54

## 2023-10-09 RX ADMIN — Medication 5 MG: at 18:07

## 2023-10-09 RX ADMIN — MIDAZOLAM HYDROCHLORIDE 3 MG: 1 INJECTION, SOLUTION INTRAMUSCULAR; INTRAVENOUS at 12:04

## 2023-10-09 RX ADMIN — FENTANYL CITRATE 75 MCG: 50 INJECTION, SOLUTION INTRAMUSCULAR; INTRAVENOUS at 12:04

## 2023-10-09 RX ADMIN — ATORVASTATIN CALCIUM 10 MG: 10 TABLET, FILM COATED ORAL at 20:19

## 2023-10-09 RX ADMIN — ACETAMINOPHEN 975 MG: 325 TABLET, FILM COATED ORAL at 15:22

## 2023-10-09 RX ADMIN — NYSTATIN 500000 UNITS: 100000 SUSPENSION ORAL at 16:27

## 2023-10-09 RX ADMIN — HEPARIN SODIUM 1900 UNITS/HR: 10000 INJECTION, SOLUTION INTRAVENOUS at 22:05

## 2023-10-09 RX ADMIN — ACETAMINOPHEN 975 MG: 325 TABLET, FILM COATED ORAL at 01:23

## 2023-10-09 RX ADMIN — PROCHLORPERAZINE EDISYLATE 5 MG: 5 INJECTION INTRAMUSCULAR; INTRAVENOUS at 06:29

## 2023-10-09 RX ADMIN — GABAPENTIN 300 MG: 300 CAPSULE ORAL at 21:55

## 2023-10-09 RX ADMIN — Medication 10 ML: at 07:58

## 2023-10-09 RX ADMIN — HEPARIN SODIUM 1900 UNITS/HR: 10000 INJECTION, SOLUTION INTRAVENOUS at 10:01

## 2023-10-09 ASSESSMENT — ACTIVITIES OF DAILY LIVING (ADL)
ADLS_ACUITY_SCORE: 43
ADLS_ACUITY_SCORE: 43
ADLS_ACUITY_SCORE: 49
ADLS_ACUITY_SCORE: 49
ADLS_ACUITY_SCORE: 45
ADLS_ACUITY_SCORE: 47
ADLS_ACUITY_SCORE: 45
ADLS_ACUITY_SCORE: 47
ADLS_ACUITY_SCORE: 43
ADLS_ACUITY_SCORE: 47
ADLS_ACUITY_SCORE: 49
ADLS_ACUITY_SCORE: 47

## 2023-10-09 NOTE — PROGRESS NOTES
[facility]    Stroke Progress Note    Interval Events  Code stroke for right facial droop.    HPI Summary  Alfredo Burnham is a 68 year old male with a PMH significant for HTN, TIA, b/l legally blind who presented to [facility] on 9/24/2023 with abdominal pain found to have a ruptured now POD13 for AAA repair.     Impression/Plan  Acute ischemic stroke of multiple vessel territory due to embolic stroke of undetermined source (ESUS)    Neuro  #Multivessel subacute to acute punctate strokes embolic    -ADDIS to rule out cardiac thrombus-test aborted, can reattempt   -Ok to continue Heparin infusion  -Defer AC plan to Primary team  -Stroke Education  -Stroke follow up as outpt in 4-6 weeks  -NCC will sign off at this time, please call #[phone] with any questions or concerns       TIME SPENT ON THIS ENCOUNTER   I spent 35 minutes of critical care time on the unit/floor managing the care of Alfredo Burnham excluding time performing procedures. Upon evaluation, this patient had a high probability of imminent or life-threatening deterioration due to acute ischemic stroke, which required my direct attention, intervention, and personal management. Greater than 50% of my time was spent at the bedside counseling the patient and/or coordinating care including chart review, history, exam, documentation, and further activities per this note. I have personally reviewed the following data/imaging over the past 24 hours.     The patient was discussed with the NCC attending, Dr. Shanks.    Mikhail FRIAS, CNP  Neurocritical care nurse practitioner  Ascom: [phone] available     Medications   Scheduled Meds   acetaminophen  975 mg Oral or Feeding Tube Q6H    atorvastatin  10 mg Oral or Feeding Tube QPM    B and C vitamin Complex with folic acid  10 mL Per Feeding Tube Daily    fentaNYL (PF)        gabapentin  300 mg Oral or Feeding Tube At Bedtime    insulin aspart  1-12  Units Subcutaneous Q4H    melatonin  5 mg Oral or Feeding Tube QPM    [Held by provider] metoprolol tartrate  12.5 mg Oral or Feeding Tube BID    midazolam        - MEDICATION INSTRUCTIONS -   Does not apply Once    nystatin  500,000 Units Oral 4x Daily    polyethylene glycol  17 g Oral or Feeding Tube Daily    protein modular  1 packet Per Feeding Tube TID    QUEtiapine  12.5-25 mg Oral or Feeding Tube At Bedtime    senna-docusate  1 tablet Oral or Feeding Tube BID    sodium chloride (PF)  3 mL Intravenous Q8H    sodium chloride (PF)  3 mL Intracatheter Q8H    sodium chloride (PF)  9 mL Intracatheter During Dialysis/CRRT (from stock)    sodium chloride (PF)  9 mL Intracatheter During Dialysis/CRRT (from stock)    sodium chloride 0.9%  250 mL Intravenous Once in dialysis/CRRT    sodium chloride 0.9%  300 mL Hemodialysis Machine Once       Infusion Meds   dextrose      dextrose      - MEDICATION INSTRUCTIONS -      heparin 1,900 Units/hr (10/09/23 1400)    - MEDICATION INSTRUCTIONS -      norepinephrine Stopped (10/08/23 1050)       PRN Meds  acetaminophen, alteplase, alteplase, bisacodyl, dextrose, dextrose, glucose **OR** dextrose **OR** glucagon, fentaNYL (PF), - MEDICATION INSTRUCTIONS -, HOLD MEDICATION, HOLD MEDICATION, HOLD MEDICATION, hydrALAZINE, HYDROmorphone, hydrOXYzine **OR** hydrOXYzine, labetalol, lidocaine 4%, lidocaine, lidocaine (buffered or not buffered), LORazepam, [Held by provider] magnesium hydroxide, - MEDICATION INSTRUCTIONS -, methocarbamol, midazolam, midodrine, naloxone **OR** naloxone **OR** naloxone **OR** naloxone, ondansetron **OR** ondansetron, oxyCODONE, prochlorperazine **OR** prochlorperazine, sodium chloride (PF), sodium chloride (PF), sodium chloride (PF), sodium chloride (PF), sodium chloride 0.9%      PHYSICAL EXAMINATION  Temp:  [97.7  F (36.5  C)-100.6  F (38.1  C)] 100.6  F (38.1  C)  Pulse:  [] 126  Resp:  [12-29] 17  BP: ()/() 118/87  MAP:  [68 mmHg-237  mmHg] 78 mmHg  Arterial Line BP: ()/(53-76) 126/58  SpO2:  [88 %-99 %] 97 %   General: Adult male patient, lying in bed, critically-ill  HEENT: Normocephalic, atraumatic, no icterus  Cardiac: Sinus rhythm on bedside monitor   Pulm: Unlabored, expansion symmetric, no retractions or use of accessory muscles  Abdomen: wound vac, soft  Extremities: Warm, mild to moderate generalized edema, no pulses to LLE, well perfused  Skin: Bruising    Neuro:  Mental status: Very drowsy, required repeated stimulation to cooperate and follow commands, Speech slurred.   Cranial nerves: PERRL, conjugate gaze, EOMI, facial sensation intact, mild right facial droop, shoulder shrug strong, tongue midline, mild to moderate dysarthria.   Motor: Normal bulk and tone. No abnormal movements. 3/5 strength in bilateral upper extremities. 2/5 strength in RLE with some purposeful movement. Did not examine LLE.  Sensory: Intact to light touch x 3 extremities (did not exam LLE)  Coordination: WENDI, deferred.   Gait: WENDI, deferred.      Imaging  I personally reviewed all imaging; relevant findings per HPI.     Lab Results Data   CBC  Recent Labs   Lab 10/09/23  0301 10/08/23  0510 10/08/23  0005   WBC 14.4* 12.5* 13.8*   RBC 2.34* 2.22* 2.21*   HGB 7.6* 7.1* 7.3*   HCT 22.9* 21.5* 21.8*    137* 134*       Basic Metabolic Panel    Recent Labs   Lab 10/09/23  1537 10/09/23  1248 10/09/23  0742 10/09/23  0314 10/09/23  0301 10/08/23  0811 10/08/23  0510 10/08/23  0303 10/08/23  0005   NA  --   --   --   --  135  --  136  --  135   POTASSIUM  --   --   --   --  4.0  --  4.1  --  4.2   CHLORIDE  --   --   --   --  97*  --  95*  --  97*   CO2  --   --   --   --  23  --  22  --  22   BUN  --   --   --   --  69.2*  --  116.0*  --  117.0*   CR  --   --   --   --  3.56*  --  5.08*  --  4.87*   * 113* 112*   < > 115*   < > 145*   < > 152*   OMAR  --   --   --   --  8.6*  --  8.1*  --  8.0*    < > = values in this interval not displayed.        Liver Panel  Recent Labs   Lab 10/09/23  0301 10/08/23  0510 10/07/23  0428   PROTTOTAL 5.5* 5.1* 4.9*   ALBUMIN 2.5* 2.6* 2.5*   BILITOTAL 0.5 0.4 0.5   ALKPHOS 182* 193* 203*   AST 55* 36 34   ALT 33 23 22       INR    Recent Labs   Lab Test 10/08/23  0005 10/06/23  2155 10/02/23  0305   INR 1.13 1.19* 1.30*        Lipid Profile    Recent Labs   Lab Test 10/07/23  0428 09/29/23  0351 09/27/23  0004   CHOL 87  --   --    HDL 23*  --   --    LDL 32  --   --    TRIG 160* 129 357*       A1C    Recent Labs   Lab Test 09/25/23 0344   A1C 5.7*       Troponin    Recent Labs   Lab 10/07/23  0428 10/06/23  2155   CTROPT 124* 112*

## 2023-10-09 NOTE — PROGRESS NOTES
Nephrology Progress Note  10/09/2023       Alfredo Burnham is a 68 yom with complex hx of TIA, HTN, blindness who presented to [facility] 9/24 with severe abdominal pain radiating to flank and back, CT revealed AAA with a contained rupture.  Taken to OR for aortobiiliac bypass and resection of ruptured pararenal aneurysm.   Post op course complicated by hypotension requiring pressors and metabolic acidosis.  Baseline Cr 1.0 on presentation but on the rise to >5 at time of renal consult for MAYA management and possible RRT.       Interval History :   Mr Burnham had CRRT stopped 10/4, had runs 10/6 and 10/8 where we were able to pull 2L each run without major issue.  We were hopeful to do extra run today to be ready for OR tomorrow but logistics with HD unit arose so will run 1st thing tomorrow am, labs with no issues.     Assessment & Recommendations:   MAYA-Baseline Cr 1.0, ordered UA.  CT showed benign cyst but otherwise normal kidneys.  Cr on the rise since surgery, did receive contrast for CTA.  Urine microscopy showed granular casts suggesting ATN which will recover with time and stabilizing hemodynamics.  Started on CRRT 9/30 for volume and clearance with minimal UOP and rising Cr.                  -Started CRRT 9/30 for volume and clearance, stopped 10/4 and now running iHD PRN and watching for recovery.        -Line is RI temp line from 10/6                 -Dialysis consent signed and scanned into media tab.      Volume-Total body volume overloaded but much of it is likely 3rd spaced with low albumin and acute illness.  Negative nearly 1L yesterday with 2L HD run, plan for run tomorrow am.      Electrolytes-K 4.0, bicarb 23, Na 135.     BMD-Ca 8.6, Mg and Phos mildly up consistent with poor clearance.       Anemia-Hgb 7.6 and stable, ~14 on presentation, acute management per team.       Nutrition-On TF      Time spent: 40 minutes on this date of encounter for chart review, physical exam, medical decision  "making and co-ordination of care.      Seen and discussed with Dr Carranza     Recommendations were communicated to primary team via verbal communication.        ALTAGRACIA Jiménez CNS  Clinical Nurse Specialist  [phone]    Review of Systems:   I reviewed the following systems:  ROS not done due to lethargy    Physical Exam:   I/O last 3 completed shifts:  In: 797.98 [I.V.:497.98; NG/GT:170]  Out: 200 [Drains:200]   /87   Pulse 115   Temp (!) 100.6  F (38.1  C) (Axillary)   Resp 14   Ht 1.727 m (5' 8\")   Wt 79.8 kg (175 lb 14.8 oz)   SpO2 97%   BMI 26.75 kg/m       GENERAL APPEARANCE: Extubated, lethargic, in no distress.   EYES: No scleral icterus  Pulmonary: On vent 40%/8 of PEEP  CV: Regular rhythm, normal rate   - Edema +2-3 generalized, ++ scrotal edema.    GI: distended, nontender  MS: no evidence of inflammation in joints, no muscle tenderness  : No Lu  SKIN: no rash, warm, dry  NEURO: No focal deficits.         Labs:   All labs reviewed by me  Electrolytes/Renal -   Recent Labs   Lab Test 10/09/23  1537 10/09/23  1248 10/09/23  0742 10/09/23  0314 10/09/23  0301 10/08/23  0811 10/08/23  0510 10/08/23  0303 10/08/23  0005 10/07/23  0903 10/07/23  0428   NA  --   --   --   --  135  --  136  --  135  --  137   POTASSIUM  --   --   --   --  4.0  --  4.1  --  4.2  --  4.2   CHLORIDE  --   --   --   --  97*  --  95*  --  97*  --  97*   CO2  --   --   --   --  23  --  22  --  22 --  23   BUN  --   --   --   --  69.2*  --  116.0*  --  117.0*  --  91.5*   CR  --   --   --   --  3.56*  --  5.08*  --  4.87*  --  3.51*   * 113* 112*   < > 115*   < > 145*   < > 152*   < > 145*   OMAR  --   --   --   --  8.6*  --  8.1*  --  8.0*  --  8.0*   MAG  --   --   --   --  1.9  --  2.4*  --   --   --  2.1   PHOS  --   --   --   --  5.4*  --  7.3*  --   --   --  5.6*    < > = values in this interval not displayed.         CBC -   Recent Labs   Lab Test 10/09/23  0301 10/08/23  0510 10/08/23  0005   WBC " 14.4* 12.5* 13.8*   HGB 7.6* 7.1* 7.3*    137* 134*         LFTs -   Recent Labs   Lab Test 10/09/23  0301 10/08/23  0510 10/07/23  0428   ALKPHOS 182* 193* 203*   BILITOTAL 0.5 0.4 0.5   ALT 33 23 22   AST 55* 36 34   PROTTOTAL 5.5* 5.1* 4.9*   ALBUMIN 2.5* 2.6* 2.5*         Iron Panel - No lab results found.        Current Medications:   acetaminophen  975 mg Oral or Feeding Tube Q6H    atorvastatin  10 mg Oral or Feeding Tube QPM    B and C vitamin Complex with folic acid  10 mL Per Feeding Tube Daily    fentaNYL (PF)        gabapentin  300 mg Oral or Feeding Tube At Bedtime    insulin aspart  1-12 Units Subcutaneous Q4H    melatonin  5 mg Oral or Feeding Tube QPM    [Held by provider] metoprolol tartrate  12.5 mg Oral or Feeding Tube BID    midazolam        - MEDICATION INSTRUCTIONS -   Does not apply Once    nystatin  500,000 Units Oral 4x Daily    polyethylene glycol  17 g Oral or Feeding Tube Daily    protein modular  1 packet Per Feeding Tube TID    QUEtiapine  12.5-25 mg Oral or Feeding Tube At Bedtime    senna-docusate  1 tablet Oral or Feeding Tube BID    sodium chloride (PF)  3 mL Intravenous Q8H    sodium chloride (PF)  3 mL Intracatheter Q8H    sodium chloride (PF)  9 mL Intracatheter During Dialysis/CRRT (from stock)    sodium chloride (PF)  9 mL Intracatheter During Dialysis/CRRT (from stock)    sodium chloride 0.9%  250 mL Intravenous Once in dialysis/CRRT    sodium chloride 0.9%  300 mL Hemodialysis Machine Once      dextrose      dextrose      - MEDICATION INSTRUCTIONS -      heparin 1,900 Units/hr (10/09/23 1400)    - MEDICATION INSTRUCTIONS -      norepinephrine Stopped (10/08/23 1050)

## 2023-10-09 NOTE — CONSULTS
[facility]  WO Nurse Inpatient Assessment     Consulted for: right heel PI    Patient History (according to provider note(s):      Alfredo Burnham is a 68 year old male who was admitted to the SICU on 9/24/2023 s/p open AAA repair. Patient has a history of HTN and previous TIA. He presented to the ED on 9/24 with right sided abdominal pain and was found to have a contained, ruptured AAA on CT. 30 min super-celiac clamp time. Prolonged intra-renal clamp. 9/25 s/p flex sig showing rectal ischemia, abdominal washout showing a hemostatic AAA graft and no evidence of transmural colonic or small bowel ischemia, and an unsuccessful LLE embolectomy. 9/26 s/p repeat abdominal washout, retroperitoneal closure, LLE wound washout. 9/29 s/p repeat abd washout, bowel appeared well perfused w/o notable ischemia. OR 10/2 for abdominal fascia closure. Extubated 10/4. Unequal pupils and R facial droop, MRI brain showed multiple small infarcts, likely embolic.      Assessment:      Areas visualized during today's visit: Focused: right heel    Pressure Injury Location: right heel     Last photo: 10/9  Wound type: Pressure Injury     Pressure Injury Stage: Deep Tissue Pressure Injury (DTPI), hospital acquired   Wound history/plan of care:   found by bedside RN 10/7  Wound base: 100 % non-blanchable, maroon, and epidermis     Palpation of the wound bed: normal      Drainage: none     Description of drainage: none     Measurements (length x width x depth, in cm) 3.8  x 3.8  x  0 cm      Tunneling N/A     Undermining N/A  Periwound skin: Erythema- blanchable      Color: pink      Temperature: normal   Odor: none  Pain: no grimacing or signs of discomfort, none  Pain intervention prior to dressing change: N/A  Treatment goal: Heal  and Protection  STATUS: initial assessment  Supplies ordered: supplies stored on unit    My PI Risk Assessment     Sensory Perception: 2 - Very Limited      Moisture: 3 - Occasionally moist      Activity: 1 - Bedfast      Mobility: 1 - Completely immobile      Nutrition: 3 - Adequate     Friction/Shear: 1 - Problem     TOTAL: 11     Treatment Plan:     Right heel wound(s): Every 3 days and PRN cleanse with wound cleanser and pat dry. Paint filemon wound skin with no sting. Conform mepilex over wound. Ensure heels are elevated with pillows or heel lift boots at all times.   *May need to use sacral mepilex if 4x4 continues to lift/ peel up.     Orders: Written    RECOMMEND PRIMARY TEAM ORDER: None, at this time  Education provided: plan of care and wound progress  Discussed plan of care with: Family and Nurse  [facility] nurse follow-up plan: twice weekly  Notify WOC if wound(s) deteriorate.  Nursing to notify the Provider(s) and re-consult the [facility] Nurse if new skin concern.    DATA:     Current support surface: Standard  Low air loss (FARSHAD pump, Isolibrium, Pulsate, skin guard, etc)  Containment of urine/stool: Incontinent pad in bed  BMI: Body mass index is 26.75 kg/m .   Active diet order: Orders Placed This Encounter      NPO per Anesthesia Guidelines for Procedure/Surgery Except for: Meds      NPO per Anesthesia Guidelines for Procedure/Surgery Except for: Meds     Output: I/O last 3 completed shifts:  In: 1136.53 [I.V.:486.53; NG/GT:240]  Out: 2200 [Drains:200; Other:2000]     Labs:   Recent Labs   Lab 10/09/23  0301 10/08/23  0510 10/08/23  0005   ALBUMIN 2.5*   < >  --    HGB 7.6*   < > 7.3*   INR  --   --  1.13   WBC 14.4*   < > 13.8*    < > = values in this interval not displayed.     Pressure injury risk assessment:   Sensory Perception: 3-->slightly limited  Moisture: 2-->very moist  Activity: 2-->chairfast  Mobility: 2-->very limited  Nutrition: 3-->adequate  Friction and Shear: 2-->potential problem  Edson Score: 14    Aria Drummond RN CWOCN   Pager no longer is use, please contact through [employer] group: [facility] Nurse [place]  Dept. Office Number:  [phone]

## 2023-10-09 NOTE — PLAN OF CARE
Neuro: Follows commands, R- Facial droop at baseline, able to move upper extremities, able to wiggle right toes.  Cardiac: ADDIS aborted as patient was unable to tolerate procedure. Fentany 75 mcg, and Versed 3 mg given. Tachy 110-120 Tmax 101.7 's no pressors, Generalized edema,   Resp: 3L NC, occasional cough  GI: N/V x1 Zofran given. OG at LIS, green brown output, incontinent of bowel x 1  : No urine output. HD held.  Skin: abdominal surgical wound to Vac Ultra, Left leg surgical wound to Vac Ultra, multiple skin tears, generalized bruising, generalized edema.   Pain: Dilaudid 0.5 given x 1  Lines/Drains: Left Radial art line, R PIV x 2, HD line Left internal jugular, Right TLC, heparin drip at 1900    PLAN:  NPO for Left  Above knee amputation, Plan to run HD prior to OR tomorrow,

## 2023-10-09 NOTE — PROCEDURES
Brief Procedure Note  Bedside transesophageal echocardiogram attempted with conscious sedation.   The patient was somnolent but responding to commands and awakened with stimulation. Low dose sedation with midazolam and fentanyl attempted for ultrasound probe placement. The patient experienced a small volume of bilious emesis with stable oxygen saturations. There was difficulty passing the echo probe and the patient became more hypoxic with audible gurgling despite suctioning. The patient was escalated from baseline 3L supplemental oxygen to oxymask with 15L. The procedure was aborted. Primary team present at bedside and informed of the above.    If ADDIS is still required, could consider intraoperative ADDIS with a secure airway in place vs pursue a repeat study at a later date when the patient is more stable from a respiratory and airway perspective.

## 2023-10-09 NOTE — PROGRESS NOTES
Vascular Surgery Progress Note  10/09/2023       Subjective:  NPO for ADDIS, with distended and slightly tender abdomen, with multiple events of nausea overnight for which received Compazine and Zofran. Low-dose norepinephrine for iHD.  Resting comfortably in bed, responds appropriately to simple questions, making jokes.    Objective:  Temp:  [97.7  F (36.5  C)-99.8  F (37.7  C)] 97.7  F (36.5  C)  Pulse:  [] 117  Resp:  [12-29] 20  BP: ()/() 118/87  MAP:  [48 mmHg-237 mmHg] 91 mmHg  Arterial Line BP: ()/(37-75) 141/69  SpO2:  [88 %-100 %] 95 %    I/O last 3 completed shifts:  In: 1136.53 [I.V.:486.53; NG/GT:240]  Out: 2200 [Drains:200; Other:2000]      Gen: Wakes up to converse, slightly lethargic this morning  CV: RR per DP pulse, off pressors, sinus tach per tele with PACs  Resp: On NC, non-labored  Abd: Wound vac in place, holding seal, abdomen soft to palpation around VAC, changed today 10/9/23.    Ext: RLE wwp. LLE cool to touch, dusky. Blistering appreciated on proximal calf posteriorly. no DP/PT signals on LLE. VAC change today                  Labs:  Recent Labs   Lab 10/09/23  0301 10/08/23  0510 10/08/23  0005   WBC 14.4* 12.5* 13.8*   HGB 7.6* 7.1* 7.3*    137* 134*       Recent Labs   Lab 10/09/23  0742 10/09/23  0314 10/09/23  0301 10/08/23  0811 10/08/23  0510 10/08/23  0303 10/08/23  0005 10/07/23  0903 10/07/23  0428   NA  --   --  135  --  136  --  135  --  137   POTASSIUM  --   --  4.0  --  4.1  --  4.2  --  4.2   CHLORIDE  --   --  97*  --  95*  --  97*  --  97*   CO2  --   --  23  --  22  --  22  --  23   BUN  --   --  69.2*  --  116.0*  --  117.0*  --  91.5*   CR  --   --  3.56*  --  5.08*  --  4.87*  --  3.51*   * 124* 115*   < > 145*   < > 152*   < > 145*   OMAR  --   --  8.6*  --  8.1*  --  8.0*  --  8.0*   MAG  --   --  1.9  --  2.4*  --   --   --  2.1   PHOS  --   --  5.4*  --  7.3*  --   --   --  5.6*    < > = values in this interval not displayed.       Assessment/Plan:   68 year old male with PMH of HTN and previous TIA who presented with R sided abdominal pain found to have contained ruptured AAA, now s/p open AAA repair 9/24 into 9/25am with 30 min supraceliac clamp time, prolonged inter-renal clamp time, temporary abdominal closure. He remains critically ill in the ICU. He did have bloody stool postop with flex sig 9/25 demonstrating ischemic mucosal changes of the rectum, however on repeated abdominal looks he has had no evidence of transmural necrosis of the small or large intestine, or the rectum. On 9/29 he was started on CRRT given ongoing low UOP and rising Cr and failure of lasix challenge. Now on iHD, requiring low dose levo during sessions. Otherwise hemodynamically continues to do well off pressors. Ischemic (outside of iHD). LLE unchanged, note DVT in this leg, this is to be expected given ischemia and absent inflow. We will continue to monitor LLE given it is not currently making patient systemically ill and there is no indication for urgent intervention. S/p closure of abdominal fascia and subcutaneous tissue left open with wound VAC applied 10/2/23. Plan for L AKA potentially on Tuesday.    - Appreciate excellent ICU care.  - OR on Tuesday for LLE amputation  - NPO after midnight for OR tomorrow  - Pending ADDIS with bubble study given unequal pupils and R facial droop, MRI brain 10/7/23 showed multiple small infarcts, likely embolic.   - Stroke workup so far negative -- MRI with multiple small infarcts  - PE+, venous duplex with nonocclusive to occlusive thrombus of the left GSV from the level of the knee to the groin and nonocclusive thrombus of the left distal femoral vein and popliteal veins, increased in extent compared to 9/30/2023. Continue with heparin gtt.  - Goal SBP <160, MAP >65  - Distended and tender abdomen, KUB with poor exposure but overall non-obstructive gas pattern. Appreciate exchanging the NGT for larger bore and low wall  suction with bowel rest, resume TF when appropriate  - Appreciate nephrology recommendations, HD runs  - VAC change today by vascular surgery 10/9/23    Discussed pt history, exam, assessment and plan with Dr. Lowe of the vascular surgery service, who is in agreement with the above.    Miryam Carrasco CNP  Vascular Surgery  Pager: [phone]  [email or web address]  Send message or 10 digit call back number Securely via [employer] with the [employer] Web Console (learn more here)

## 2023-10-09 NOTE — PLAN OF CARE
Goal Outcome Evaluation:    Major Shift Events:  NPO for ADDIS.  Neuro:  Patient follows commands, R-facial droop baseline, Anisocoria L>R baseline, follows commands and can move uppers, lowers L-leg moves hip and knees.  R-leg wiggles toes.  Neuro checks [id number].  Cards: Patient with Tmax=, on scheduled tylenol, MAP>65, SBP<160, A-line on L-radial.  Resp: LS coarse, NC @3L SaO2=90s, infrequent cough and swallowing secretions and oral sx with tan thick sputum. Edema legs, arms and scrotal/penile.   GI/: NPO for ADDIS, TF stopped at MN, NJ clamped. Rectal pouch leakage and not adhere to the skin he is now free of rectal pouch.  Multiple feeling of nauseous and compazine and zofran given.  Patient had HD on 10/8/23, and oliguric another HD today.  Loose stool X2, pericare and barrier cream applied.  Skin: Patient easily bruised, blisters and skin tears in multiple places with dressing see flowsheet.  Wound vac in abd and L-shin.  R-leg elevated off-loading for heal nonblanchable redness.   Plan: NPO for ADDIS and not to stopped heparin, clarification provided by Dr. Tillman via phone.  For vital signs and complete assessments, please see documentation flowsheets.

## 2023-10-09 NOTE — PROGRESS NOTES
SURGICAL ICU PROGRESS NOTE  10/09/2023        Date of Service (when I saw the patient): 10/09/2023    ASSESSMENT:  Alfredo Burnham is a 68 year old male who was admitted to the SICU on 9/24/2023 s/p open AAA repair. Patient has a history of HTN and previous TIA. He presented to the ED on 9/24 with right sided abdominal pain and was found to have a contained, ruptured AAA on CT. 30 min super-celiac clamp time. Prolonged intra-renal clamp. 9/25 s/p flex sig showing rectal ischemia, abdominal washout showing a hemostatic AAA graft and no evidence of transmural colonic or small bowel ischemia, and an unsuccessful LLE embolectomy. 9/26 s/p repeat abdominal washout, retroperitoneal closure, LLE wound washout. 9/29 s/p repeat abd washout, bowel appeared well perfused w/o notable ischemia. OR 10/2 for abdominal fascia closure. Extubated 10/4. Unequal pupils and R facial droop, MRI brain showed multiple small infarcts, likely embolic.       CHANGES and MAJOR THINGS TODAY:   - iHD per nephrology  - ADDIS today  - Discuss pain control plan with RAPS  - Continue to hold tube feeds following ADDIS  - Hold heparin at midnight for OR    PLAN:    Neurological:  # Acute pain   - Multi-modal pain control (oxycodone PRN, dilaudid PRN, gabapentin TID, scheduled tylenol, robaxin PRN)   - Cryo nerve block tmrw at 3PM with pain team.     #Facial droop, left lacunar infarct  #Punctate lesions of cerebellum   -ADDIS with bubble study today  -high dose heparin      # Delirium, CAM-ICU score=3 (delirium present)  # Mixed metabolic encephalopathy   # Sedation, RASS goal 0 to -1  - Monitor neurological status. Delirium preventions and precautions  - Melatonin q6pm and seroquel for sleep aid  - Atarax PRN for anxiety     Pulmonary:  # Acute hypoxic respiratory failure  # s/p extubation 10/3  - Nasal cannula @ 3 LPM; continue to wean down as tolerated  - Head of bed elevation     Cardiovascular:    # Contained AAA rupture s/p open repair  #  Suspected emboli to LLE, LLE ischemia  # Hx of HTN  - Continue to monitor hemodynamic status; Goal MAP >65, SBP <160  - L AKA planned for Tuesday (10/10)  - Lipitor 10 mg, metoprolol 12.5 BID  -ADDIS to assess for source of emboli today  - Hold heparin at midnight for pre-op     GI/Nutrition:    # s/p open AAA repair, abthera device in place  # Post-operative melena, resolved  # Rectal ischemia, concern for, resolved  - Abdomen closed  - NJ feeds held for ADDIS  - Continue bowel regimen  - Ok for ice chips by mouth    Renal/ Fluids/Electrolytes:  # MAYA  # Oliguria  - Dialysis management per nephrology, iHD  - Daily straight cath    Endocrine:  # Stress hyperglycemia  - Q6 BG checks, continue high dose sliding scale insulin.   - BG in the 140s over last 24h, no changes to regimen needed    Infectious disease:   Afebrile.   S/p cefepime, vancomycin, metronidazole, and voriconazole for positive BAL (aspergillus and candida) and fever of unknown origin (ultimately believed to be side effect of precedex)  - Nystatin for thrush     Hematology:    # Acute blood loss anemia  # Thrombocytopenia, multifactorial  # LLE ischemia/ DVT  # PE  Platelet count recovering s/p 4-day course of steroids. Low risk of HIT per 4T score.  - High dose heparin (to be held at midnight)  - Hydrocortisone taper completed 10/5  - Considered CT CAP per neuro crit recommendation to assess for malignancy, given recent CT CAP 9/24 which showed no malignancy, likely minimal utility. Infarcts resemble microemboli of atherosclerotic origin which may be secondary to AAA repair.     Musculoskeletal:  # Weakness and deconditioning of critical illness  # Left lower limb ischemia   - Passive ROM at bedside with RN  - Likely will return to OR with vascular 10/10 for LLE amputation; planning for block prior to surgery but RAPS with eval d/t new heparin gtt  - Physical therapy consult   - Podiatry consult for nail clipping       General Cares/Prophylaxis:    DVT  Prophylaxis: Heparin SQ and Pneumatic Compression Devices  GI Prophylaxis: Not indicated  Restraints: Restraints for medical healing needed: YES    Lines/ tubes/ drains:  - PIV x3  - RIJ Dialysis line  - R radial arterial line  - VAC on abdomen and L leg       Disposition:  - Surgical ICU     Patient seen, findings and plan discussed with surgical ICU staff, Dr. SAAVEDRA .      Jenni Mora, MS4  Surgical ICU    ====================================  INTERVAL HISTORY:   Some nausea and emesis overnight. Continues to have nausea, not improved by medications. Overall doing well this morning and able to converse weakly but clearly. Tube feeds have been paused for ADDIS today.     ROS unable to be performed due to sedation and delirium.      OBJECTIVE:   1. VITAL SIGNS:   Temp:  [98.4  F (36.9  C)-99.8  F (37.7  C)] 99.2  F (37.3  C)  Pulse:  [] 109  Resp:  [14-29] 15  BP: ()/() 118/87  MAP:  [48 mmHg-237 mmHg] 90 mmHg  Arterial Line BP: ()/(37-75) 141/67  SpO2:  [88 %-100 %] 97 %  Resp: 15      2. INTAKE/ OUTPUT:   I/O last 3 completed shifts:  In: 1643.19 [I.V.:708.19; NG/GT:330]  Out: 2450 [Drains:200; Other:2000; Stool:250]    3. PHYSICAL EXAMINATION:  General: Nasal cannula, lightly sedated. Can squeeze right fingers on command and answer simple yes/no questions. Speech difficult to understand.  HEENT: Normocephalic, atraumatic. RIJ dialysis line  Neuro: Moving all extremities spontaneously, pupils reactive, EOMI, can raise eyebrows and weakly smile   Pulm/Resp: Clear lung fields  CV: Regular rhythm, occasionally mildly tachycardic   Abdomen: Fascia closed with VAC in place, serosanguinous output, soft, mildly distended, soft  :  deferred  MSK/Extremities: RLE warm to palpation with multiphasic DP, edematous. LLE cool to touch, dusky, no signals distal of popliteal. VAC on LLE fasciotomy sites    4. INVESTIGATIONS:   Arterial Blood Gases   Recent Labs   Lab 10/03/23  2043   PH 7.44    PCO2 31*   PO2 61*   HCO3 21     Complete Blood Count   Recent Labs   Lab 10/09/23  0301 10/08/23  0510 10/08/23  0005 10/07/23  0838   WBC 14.4* 12.5* 13.8* 13.2*   HGB 7.6* 7.1* 7.3* 7.8*    137* 134* 119*     Basic Metabolic Panel  Recent Labs   Lab 10/09/23  0314 10/09/23  0301 10/08/23  2326 10/08/23  1936 10/08/23  0811 10/08/23  0510 10/08/23  0303 10/08/23  0005 10/07/23  0903 10/07/23  0428   NA  --  135  --   --   --  136  --  135  --  137   POTASSIUM  --  4.0  --   --   --  4.1  --  4.2  --  4.2   CHLORIDE  --  97*  --   --   --  95*  --  97*  --  97*   CO2  --  23  --   --   --  22  --  22  --  23   BUN  --  69.2*  --   --   --  116.0*  --  117.0*  --  91.5*   CR  --  3.56*  --   --   --  5.08*  --  4.87*  --  3.51*   * 115* 129* 153*   < > 145*   < > 152*   < > 145*    < > = values in this interval not displayed.     Liver Function Tests  Recent Labs   Lab 10/09/23  0301 10/08/23  0510 10/08/23  0005 10/07/23  0428 10/06/23  2155 10/06/23  0330   AST 55* 36  --  34  --  40   ALT 33 23  --  22  --  28   ALKPHOS 182* 193*  --  203*  --  253*   BILITOTAL 0.5 0.4  --  0.5  --  0.4   ALBUMIN 2.5* 2.6*  --  2.5*  --  2.4*   INR  --   --  1.13  --  1.19*  --      Pancreatic Enzymes  No lab results found in last 7 days.  Coagulation Profile  Recent Labs   Lab 10/08/23  0005 10/06/23  2155   INR 1.13 1.19*   PTT 81* 31         5. RADIOLOGY:   Recent Results (from the past 24 hour(s))   XR Abdomen Port 1 View    Narrative    Exam: XR ABDOMEN PORT 1 VIEW, 10/8/2023 6:13 PM    Indication: Nausea, vomiting, ?gastric distension?    Comparison: 10/4/2023    Findings:   Single portable AP view of the abdomen. Feeding tube with the tip in  the distal duodenum. Nonobstructive bowel gas pattern. Normal stomach  bubble. No acute osseous abnormalities.      Impression    Impression: Nonobstructive bowel gas pattern. Feeding tube with tip in  the distal duodenum.     I have personally reviewed the examination  and initial interpretation  and I agree with the findings.    HERNANDO HERNANDEZ MD         SYSTEM ID:  [id number]       =========================================    I saw and evaluated the patient in an independent visit and agree with the student's assessment and plan as written above. I was present at all times for any mentioned procedures.     Clint Braga MD  PGY-1, Neurosurgery  Off-service on SICU

## 2023-10-10 ENCOUNTER — APPOINTMENT (OUTPATIENT)
Dept: GENERAL RADIOLOGY | Facility: CLINIC | Age: 68
DRG: 268 | End: 2023-10-10
Attending: STUDENT IN AN ORGANIZED HEALTH CARE EDUCATION/TRAINING PROGRAM
Payer: COMMERCIAL

## 2023-10-10 LAB
ALBUMIN SERPL BCG-MCNC: 2.6 G/DL (ref 3.5–5.2)
ALP SERPL-CCNC: 142 U/L (ref 40–129)
ALT SERPL W P-5'-P-CCNC: 29 U/L (ref 0–70)
ANION GAP SERPL CALCULATED.3IONS-SCNC: 17 MMOL/L (ref 7–15)
AST SERPL W P-5'-P-CCNC: 42 U/L (ref 0–45)
BILIRUB DIRECT SERPL-MCNC: 0.34 MG/DL (ref 0–0.3)
BILIRUB SERPL-MCNC: 0.5 MG/DL
BLD PROD TYP BPU: NORMAL
BLOOD COMPONENT TYPE: NORMAL
BUN SERPL-MCNC: 94.4 MG/DL (ref 8–23)
CA-I BLD-MCNC: 4.3 MG/DL (ref 4.4–5.2)
CALCIUM SERPL-MCNC: 8.6 MG/DL (ref 8.8–10.2)
CHLORIDE SERPL-SCNC: 93 MMOL/L (ref 98–107)
CODING SYSTEM: NORMAL
CREAT SERPL-MCNC: 5.28 MG/DL (ref 0.67–1.17)
CROSSMATCH: NORMAL
DEPRECATED HCO3 PLAS-SCNC: 27 MMOL/L (ref 22–29)
EGFRCR SERPLBLD CKD-EPI 2021: 11 ML/MIN/1.73M2
ERYTHROCYTE [DISTWIDTH] IN BLOOD BY AUTOMATED COUNT: 18.2 % (ref 10–15)
GLUCOSE BLDC GLUCOMTR-MCNC: 112 MG/DL (ref 70–99)
GLUCOSE BLDC GLUCOMTR-MCNC: 113 MG/DL (ref 70–99)
GLUCOSE BLDC GLUCOMTR-MCNC: 123 MG/DL (ref 70–99)
GLUCOSE BLDC GLUCOMTR-MCNC: 125 MG/DL (ref 70–99)
GLUCOSE BLDC GLUCOMTR-MCNC: 126 MG/DL (ref 70–99)
GLUCOSE BLDC GLUCOMTR-MCNC: 133 MG/DL (ref 70–99)
GLUCOSE BLDC GLUCOMTR-MCNC: 139 MG/DL (ref 70–99)
GLUCOSE SERPL-MCNC: 129 MG/DL (ref 70–99)
HCT VFR BLD AUTO: 21.4 % (ref 40–53)
HGB BLD-MCNC: 7 G/DL (ref 13.3–17.7)
ISSUE DATE AND TIME: NORMAL
LACTATE SERPL-SCNC: 0.8 MMOL/L (ref 0.7–2)
MAGNESIUM SERPL-MCNC: 2 MG/DL (ref 1.7–2.3)
MCH RBC QN AUTO: 32.3 PG (ref 26.5–33)
MCHC RBC AUTO-ENTMCNC: 32.7 G/DL (ref 31.5–36.5)
MCV RBC AUTO: 99 FL (ref 78–100)
PHOSPHATE SERPL-MCNC: 7.4 MG/DL (ref 2.5–4.5)
PLATELET # BLD AUTO: 182 10E3/UL (ref 150–450)
POTASSIUM SERPL-SCNC: 4.1 MMOL/L (ref 3.4–5.3)
PROT SERPL-MCNC: 5.5 G/DL (ref 6.4–8.3)
RBC # BLD AUTO: 2.17 10E6/UL (ref 4.4–5.9)
SODIUM SERPL-SCNC: 137 MMOL/L (ref 135–145)
UFH PPP CHRO-ACNC: 0.53 IU/ML
UNIT ABO/RH: NORMAL
UNIT NUMBER: NORMAL
UNIT STATUS: NORMAL
UNIT TYPE ISBT: 5100
WBC # BLD AUTO: 17.3 10E3/UL (ref 4–11)

## 2023-10-10 PROCEDURE — 85014 HEMATOCRIT: CPT

## 2023-10-10 PROCEDURE — 250N000013 HC RX MED GY IP 250 OP 250 PS 637: Performed by: SURGERY

## 2023-10-10 PROCEDURE — 74018 RADEX ABDOMEN 1 VIEW: CPT | Mod: 76

## 2023-10-10 PROCEDURE — 71045 X-RAY EXAM CHEST 1 VIEW: CPT

## 2023-10-10 PROCEDURE — 250N000013 HC RX MED GY IP 250 OP 250 PS 637

## 2023-10-10 PROCEDURE — 84100 ASSAY OF PHOSPHORUS: CPT | Performed by: PHYSICIAN ASSISTANT

## 2023-10-10 PROCEDURE — 83605 ASSAY OF LACTIC ACID: CPT | Performed by: PHYSICIAN ASSISTANT

## 2023-10-10 PROCEDURE — 99291 CRITICAL CARE FIRST HOUR: CPT | Mod: GC | Performed by: SURGERY

## 2023-10-10 PROCEDURE — 82330 ASSAY OF CALCIUM: CPT | Performed by: PHYSICIAN ASSISTANT

## 2023-10-10 PROCEDURE — 250N000013 HC RX MED GY IP 250 OP 250 PS 637: Performed by: STUDENT IN AN ORGANIZED HEALTH CARE EDUCATION/TRAINING PROGRAM

## 2023-10-10 PROCEDURE — 74018 RADEX ABDOMEN 1 VIEW: CPT | Mod: 26 | Performed by: RADIOLOGY

## 2023-10-10 PROCEDURE — 99233 SBSQ HOSP IP/OBS HIGH 50: CPT | Mod: FS | Performed by: CLINICAL NURSE SPECIALIST

## 2023-10-10 PROCEDURE — 258N000003 HC RX IP 258 OP 636: Performed by: CLINICAL NURSE SPECIALIST

## 2023-10-10 PROCEDURE — 80076 HEPATIC FUNCTION PANEL: CPT

## 2023-10-10 PROCEDURE — 250N000013 HC RX MED GY IP 250 OP 250 PS 637: Performed by: PHARMACIST

## 2023-10-10 PROCEDURE — 83735 ASSAY OF MAGNESIUM: CPT | Performed by: PHYSICIAN ASSISTANT

## 2023-10-10 PROCEDURE — 85520 HEPARIN ASSAY: CPT | Performed by: SURGERY

## 2023-10-10 PROCEDURE — 74018 RADEX ABDOMEN 1 VIEW: CPT

## 2023-10-10 PROCEDURE — 99291 CRITICAL CARE FIRST HOUR: CPT | Performed by: NURSE PRACTITIONER

## 2023-10-10 PROCEDURE — 71045 X-RAY EXAM CHEST 1 VIEW: CPT | Mod: 26 | Performed by: RADIOLOGY

## 2023-10-10 PROCEDURE — 200N000002 HC R&B ICU UMMC

## 2023-10-10 PROCEDURE — 90937 HEMODIALYSIS REPEATED EVAL: CPT

## 2023-10-10 PROCEDURE — P9016 RBC LEUKOCYTES REDUCED: HCPCS | Performed by: PHARMACIST

## 2023-10-10 PROCEDURE — 250N000011 HC RX IP 250 OP 636: Mod: JZ

## 2023-10-10 RX ORDER — LIDOCAINE HYDROCHLORIDE 20 MG/ML
JELLY TOPICAL EVERY 4 HOURS PRN
Status: DISCONTINUED | OUTPATIENT
Start: 2023-10-10 | End: 2023-11-07

## 2023-10-10 RX ORDER — HEPARIN SODIUM 10000 [USP'U]/100ML
0-5000 INJECTION, SOLUTION INTRAVENOUS CONTINUOUS
Status: CANCELLED | OUTPATIENT
Start: 2023-10-10 | End: 2023-10-10

## 2023-10-10 RX ADMIN — SENNOSIDES AND DOCUSATE SODIUM 1 TABLET: 50; 8.6 TABLET ORAL at 07:35

## 2023-10-10 RX ADMIN — Medication 10 ML: at 07:35

## 2023-10-10 RX ADMIN — Medication: at 09:09

## 2023-10-10 RX ADMIN — Medication 12.5 MG: at 22:24

## 2023-10-10 RX ADMIN — GABAPENTIN 300 MG: 300 CAPSULE ORAL at 22:21

## 2023-10-10 RX ADMIN — POLYETHYLENE GLYCOL 3350 17 G: 17 POWDER, FOR SOLUTION ORAL at 07:35

## 2023-10-10 RX ADMIN — NYSTATIN 500000 UNITS: 100000 SUSPENSION ORAL at 07:35

## 2023-10-10 RX ADMIN — ACETAMINOPHEN 975 MG: 325 TABLET, FILM COATED ORAL at 14:41

## 2023-10-10 RX ADMIN — SODIUM CHLORIDE 250 ML: 9 INJECTION, SOLUTION INTRAVENOUS at 09:09

## 2023-10-10 RX ADMIN — ACETAMINOPHEN 975 MG: 325 TABLET, FILM COATED ORAL at 19:25

## 2023-10-10 RX ADMIN — SODIUM CHLORIDE 300 ML: 9 INJECTION, SOLUTION INTRAVENOUS at 09:08

## 2023-10-10 RX ADMIN — HEPARIN SODIUM 1900 UNITS/HR: 10000 INJECTION, SOLUTION INTRAVENOUS at 22:45

## 2023-10-10 RX ADMIN — Medication 5 MG: at 18:26

## 2023-10-10 RX ADMIN — HYDROXYZINE HYDROCHLORIDE 50 MG: 25 TABLET, FILM COATED ORAL at 14:43

## 2023-10-10 RX ADMIN — ATORVASTATIN CALCIUM 10 MG: 10 TABLET, FILM COATED ORAL at 19:26

## 2023-10-10 RX ADMIN — HEPARIN SODIUM 1900 UNITS/HR: 10000 INJECTION, SOLUTION INTRAVENOUS at 10:17

## 2023-10-10 RX ADMIN — HYDROMORPHONE HYDROCHLORIDE 0.5 MG: 1 INJECTION, SOLUTION INTRAMUSCULAR; INTRAVENOUS; SUBCUTANEOUS at 11:26

## 2023-10-10 RX ADMIN — ACETAMINOPHEN 975 MG: 325 TABLET, FILM COATED ORAL at 01:28

## 2023-10-10 RX ADMIN — HYDROMORPHONE HYDROCHLORIDE 0.5 MG: 1 INJECTION, SOLUTION INTRAMUSCULAR; INTRAVENOUS; SUBCUTANEOUS at 18:11

## 2023-10-10 RX ADMIN — NYSTATIN 500000 UNITS: 100000 SUSPENSION ORAL at 11:26

## 2023-10-10 RX ADMIN — ACETAMINOPHEN 975 MG: 325 TABLET, FILM COATED ORAL at 07:35

## 2023-10-10 RX ADMIN — NYSTATIN 500000 UNITS: 100000 SUSPENSION ORAL at 16:32

## 2023-10-10 RX ADMIN — NYSTATIN 500000 UNITS: 100000 SUSPENSION ORAL at 19:26

## 2023-10-10 ASSESSMENT — ACTIVITIES OF DAILY LIVING (ADL)
ADLS_ACUITY_SCORE: 45
ADLS_ACUITY_SCORE: 49
ADLS_ACUITY_SCORE: 45

## 2023-10-10 NOTE — PROGRESS NOTES
Date: 10/10/2023  Time: 1212     Data:  Pre Wt:   79.8 kg as of 10/9/2023  Desired Wt:   To be established  Post Wt:  76.8 kg (estimated)  Weight change: - 3 kg  Ultrafiltration - Post Run Net Total Removed (mL):  3000 ml  Vascular Access Status: CVC patent  Dialyzer Rinse:  Light  Total Blood Volume Processed: 73.5 L   Total Dialysis (Treatment) Time:   3.5 Hrs  Dialysate Bath: K 3, Ca 2.25  Heparin: Heparin: None     Lab:   No    Interventions:  Dialysis done through [facility]   UF set to 3 Liters of fluid removal, accommodating priming and rinse back volumes  ,   Catheter lumens locked with saline, catheter caps (ClearGuard ) changed post HD  Report given to PCN.   Assessment:  Disoriented x3 , calm & cooperative, denies pain  CVC intact, previous dressing clean and dry                Plan:    Per Renal team        Maria Parra, BSN, RN  Acute Dialysis RN  [facility]

## 2023-10-10 NOTE — PLAN OF CARE
Major Shift Events: Alert, and oriented to person and place, intermittently situation. Whispers, hoarse voice. Opens eyes spontaneously or to speech. Pulling at tubes and drains, Mitts applied. Weak in LLE but follows commands. Sinus rhythm to sinus tach, 120's. Frequent PVC's. Afebrile. Sating adequately on 3L NC. Unproductive cough, unable to collect respiratory culture. NG to LIS. High output. Spontaneous void X1. Stool X2. Vac to midline and LLE. Heparin is therapeutic at 1900. Dialysis done today.   Plan: Plan for OR tomorrow. Continue ICU cares.  For vital signs and complete assessments, please see documentation flowsheets.

## 2023-10-10 NOTE — CARE PLAN
SLP: Orders received. Patient currently on continuous NG suction. Swallow evaluation not appropriate at this time, nursing in agreement. Will review for swallow evaluation appropriateness as indicated.    Edit: Family requesting ice chips, SLP communicated if patient was fully alert and fully upright, after completion of oral cares, patient could have some ice chips fed to him for comfort only.

## 2023-10-10 NOTE — PROGRESS NOTES
SURGICAL ICU PROGRESS NOTE  10/10/2023        Date of Service (when I saw the patient): 10/10/2023    ASSESSMENT:  Alfredo Burnham is a 68 year old male who was admitted to the SICU on 9/24/2023 s/p open AAA repair. Patient has a history of HTN and previous TIA. He presented to the ED on 9/24 with right sided abdominal pain and was found to have a contained, ruptured AAA on CT. 30 min super-celiac clamp time. Prolonged intra-renal clamp. 9/25 s/p flex sig showing rectal ischemia, abdominal washout showing a hemostatic AAA graft and no evidence of transmural colonic or small bowel ischemia, and an unsuccessful LLE embolectomy. 9/26 s/p repeat abdominal washout, retroperitoneal closure, LLE wound washout. 9/29 s/p repeat abd washout, bowel appeared well perfused w/o notable ischemia. OR 10/2 for abdominal fascia closure. Extubated 10/4. Unequal pupils and R facial droop, MRI brain showed multiple small infarcts, likely embolic.     CHANGES and MAJOR THINGS TODAY:   - iHD per nephrology today  - L above knee amputation delayed until later this week due to fever, increasing white count  - remain NPO  - Sputum culture  - Consider role for abx     PLAN:    Neurological:  # Acute pain   - Multi-modal pain control effective (scheduled tylenol, oxycodone PRN, dilaudid PRN, gabapentin TID, robaxin PRN)  - Nerve block delayed until amputation day, pending scheduling     #Facial droop, left lacunar infarct  #Punctate lesions of cerebellum   -high dose heparin   - ADDIS when possible  - Follow-up with stroke neurology 4-6 weeks after discharge  - Ok with anticoagulation per NCC  - NCC signed-off     # Delirium, CAM-ICU score=3 (delirium present)  # Mixed metabolic encephalopathy   # Sedation, RASS goal 0 to -1  - Monitor neurological status. Delirium preventions and precautions  - Melatonin q6pm and seroquel for sleep aid  - Atarax PRN for anxiety     Pulmonary:  # Acute hypoxic respiratory failure  # s/p extubation  10/3  Satting 96-98% overnight.   - Nasal cannula @ 3 LPM; continue to wean down as tolerated  - Head of bed elevation   - CXR showing L pleural effusion and bilateral hazy opacities    Cardiovascular:    # Contained AAA rupture s/p open repair  # Suspected emboli to LLE, LLE ischemia  # Hx of HTN  # Tachycardia  - Transfused 1u pRBCs morning 10/10  - Continue to monitor hemodynamic status; Goal MAP >65, SBP <160  - L AKA delayed  - high dose heparin   - Continue Lipitor 10 mg, metoprolol 12.5 BID    GI/Nutrition:    # s/p open AAA repair, abthera device in place  # Post-operative melena, resolved  # Rectal ischemia, concern for, resolved  1L of output from NG set on LIS yesterday, 1.2 L overnight.  - Abdomen closed  - tube feeds held for pre-op and emesis  - Continue bowel regimen  - Ok for ice chips by mouth  - Abdominal xray shows NG projecting over stomach    Renal/Fluids/Electrolytes:  # MAYA  # Oliguria  Cr at 5.28 this morning. Nephrology planning for dialysis run this morning.   - Dialysis management per nephrology, iHD  - Daily straight cath    Endocrine:  # Stress hyperglycemia  - Q6 BG checks, continue high dose sliding scale insulin.   - BG in the 120s over last 24h, no changes to regimen needed    Infectious disease:   # Fever   # Likely aspiration event during ADDIS  Rising white count up to 17 and fevering up to 101.7. Also tachycardic up 130's-140's overnight.   S/p cefepime, vancomycin, metronidazole, and voriconazole for positive BAL (aspergillus and candida) and fever of unknown origin (ultimately believed to be side effect of precedex).  In setting of fever to 101.7 and recent aspiration, would favor starting abx.   - Sputum culture  - consider starting zosyn   - Nystatin for thrush    Hematology:    # Acute blood loss anemia  # Thrombocytopenia (resolved)  # LLE ischemia/ DVT  # PE  - Transfused unit of pRBC for hgb=7.0  - High dose heparin (to be held at 0600)  - Hydrocortisone taper completed  10/5  - Considered CT CAP per neuro crit recommendation to assess for malignancy, given recent CT CAP 9/24 which showed no malignancy, likely minimal utility. Infarcts resemble microemboli of atherosclerotic origin which may be secondary to AAA repair.     Musculoskeletal:  # Weakness and deconditioning of critical illness  # Left lower limb ischemia   - Passive ROM at bedside with RN  - Likely will return to OR with vascular 10/10 for LLE amputation; planning for block prior to surgery but RAPS with eval d/t new heparin gtt  - Physical therapy consult   - Podiatry consult for nail clipping     General Cares/Prophylaxis:    DVT Prophylaxis: Heparin SQ and Pneumatic Compression Devices  GI Prophylaxis: Not indicated  Restraints: Restraints for medical healing needed: YES    Lines/ tubes/ drains:  - PIV x3  - RIJ Dialysis line  - R radial arterial line  - VAC on abdomen and L leg    Disposition:  - Surgical ICU     Patient seen, findings and plan discussed with surgical ICU staff, Dr. QUENTIN Mora, MS4  Surgical ICU    ====================================  INTERVAL HISTORY:   Fevering yesterday evening into overnight up to 101.7. Tachycardic overnight (130's-140's) and Hgb of 7.0 this morning.     ROS unable to be performed due to unintelligible speech and     OBJECTIVE:   1. VITAL SIGNS:   Temp:  [97.7  F (36.5  C)-101.7  F (38.7  C)] 99.2  F (37.3  C)  Pulse:  [106-129] 117  Resp:  [12-25] 23  MAP:  [71 mmHg-106 mmHg] 106 mmHg  Arterial Line BP: ()/(53-78) 165/75  SpO2:  [91 %-99 %] 91 %  Resp: 23      2. INTAKE/ OUTPUT:   I/O last 3 completed shifts:  In: 653 [I.V.:528; NG/GT:110]  Out: 100 [Drains:100]    3. PHYSICAL EXAMINATION:  General: Nasal cannula, lightly sedated. Can squeeze right fingers on command and answer simple yes/no questions. Speech difficult to understand.  HEENT: Normocephalic, atraumatic. RIJ dialysis line  Neuro: Moving all extremities spontaneously, pupils reactive,  EOMI, can raise eyebrows and weakly smile   Pulm/Resp: Clear lung fields  CV: Regular rhythm, occasionally mildly tachycardic   Abdomen: Fascia closed with VAC in place, serosanguinous output, soft, mildly distended, soft  :  deferred  MSK/Extremities: RLE warm to palpation with multiphasic DP, edematous. LLE cool to touch, dusky, no signals distal of popliteal. VAC on LLE fasciotomy sites       4. INVESTIGATIONS:   Arterial Blood Gases   Recent Labs   Lab 10/03/23  2043   PH 7.44   PCO2 31*   PO2 61*   HCO3 21     Complete Blood Count   Recent Labs   Lab 10/10/23  0417 10/09/23  0301 10/08/23  0510 10/08/23  0005   WBC 17.3* 14.4* 12.5* 13.8*   HGB 7.0* 7.6* 7.1* 7.3*    169 137* 134*     Basic Metabolic Panel  Recent Labs   Lab 10/10/23  0417 10/10/23  0415 10/10/23  0357 10/09/23  2346 10/09/23  0314 10/09/23  0301 10/08/23  0811 10/08/23  0510 10/08/23  0303 10/08/23  0005     --   --   --   --  135  --  136  --  135   POTASSIUM 4.1  --   --   --   --  4.0  --  4.1  --  4.2   CHLORIDE 93*  --   --   --   --  97*  --  95*  --  97*   CO2 27  --   --   --   --  23  --  22  --  22   BUN 94.4*  --   --   --   --  69.2*  --  116.0*  --  117.0*   CR 5.28*  --   --   --   --  3.56*  --  5.08*  --  4.87*   * 123* 126* 119*   < > 115*   < > 145*   < > 152*    < > = values in this interval not displayed.     Liver Function Tests  Recent Labs   Lab 10/10/23  0417 10/09/23  0301 10/08/23  0510 10/08/23  0005 10/07/23  0428 10/06/23  2155   AST 42 55* 36  --  34  --    ALT 29 33 23  --  22  --    ALKPHOS 142* 182* 193*  --  203*  --    BILITOTAL 0.5 0.5 0.4  --  0.5  --    ALBUMIN 2.6* 2.5* 2.6*  --  2.5*  --    INR  --   --   --  1.13  --  1.19*     Pancreatic Enzymes  No lab results found in last 7 days.  Coagulation Profile  Recent Labs   Lab 10/08/23  0005 10/06/23  2155   INR 1.13 1.19*   PTT 81* 31         5. RADIOLOGY:   Recent Results (from the past 24 hour(s))   XR Abdomen Port 1 View     Narrative    Exam: XR ABDOMEN PORT 1 VIEW, 10/9/2023 2:18 PM    Indication: s/p NG tube placement    Comparison: 10/8/2023    Findings:   Portable AP upright view of the abdomen. Feeding tube is been removed.  Gastric tube tip and sidehole project over the stomach. Surgical clips  project over the midline lower abdomen. Gaseous distended small and  large bowel and stomach in a nonobstructive pattern. No pneumatosis or  portal venous gas. Bibasilar atelectasis.      Impression    Impression: Gastric tube tip and sidehole project over the stomach.    I have personally reviewed the examination and initial interpretation  and I agree with the findings.    SABINO MURRIETA DO         SYSTEM ID:  [id number]   XR Chest Port 1 View    Narrative    Exam: XR CHEST PORT 1 VIEW, 10/9/2023 1:52 PM    Comparison: 10/6/2023    History: s/p emesis, concern for aspiration    Findings:  Portable AP view of the chest. Right internal jugular central venous  catheter with tip projecting over the SVC. Left subclavian line with  tip projecting over the right brachiocephalic vein confluence. Gastric  tube tip at the distal esophagus with sidehole in the thoracic  esophagus.    Low lung volumes with diffuse interstitial and airspace opacities.  Small bilateral pleural effusions. No pneumothorax. Stable cardiac  silhouette. No acute osseous abnormality.       Impression    Impression:   1. Low lung volumes with ongoing mixed diffuse opacities, representing  atelectasis/edema versus infection. Findings could also potentially  represent aspiration.  2. Similar small bilateral pleural effusions.  3. Gastric tube tip at the distal esophagus with sidehole in the  thoracic esophagus. The gastric tube appears advanced in subsequent  abdominal radiograph.    I have personally reviewed the examination and initial interpretation  and I agree with the findings.    HYACINTH SUAZO MD         SYSTEM ID:  [id number]   XR Chest Port 1 View    Narrative     Exam: XR CHEST PORT 1 VIEW, 10/9/2023 8:28 PM    Indication: s/p aspiration risk, fever, concern for pneumonia    Comparison: X-ray chest 10/9/2023, 1329. Multiple priors.    Findings:   AP portable supine radiograph of the chest. Right IJ CVC tip  terminates in the mid SVC. Enteric tube courses below the diaphragm  with tip out of the field of view. Stable positioning of left upper  extremity PICC with tip terminating in the superior SVC near the  junction with the innominate vein. Stable cardiac mediastinal  silhouette. Low lung volumes. Similar-appearing bibasilar hazy  opacities, left greater than right. Slight interval increase in the  small left pleural effusion, no right pleural effusion. No definite  pneumothorax. Visualized upper abdomen unremarkable.      Impression    Impression:   1. Persistent low lung volumes and basilar atelectasis. Superimposed  aspiration or infection is not excluded.  2. Small left pleural effusion.    I have personally reviewed the examination and initial interpretation  and I agree with the findings.    LANCE VILCHIS DO         SYSTEM ID:  [id number]       =========================================  I saw and evaluated the patient in an independent visit and agree with the student's assessment and plan as written above. I was present at all times for any mentioned procedures.     Clint Braga MD  PGY-1, Neurosurgery  Off-service on SICU

## 2023-10-10 NOTE — PROGRESS NOTES
Vascular Surgery Progress Note  10/10/2023       Subjective:  With potential microaspiration, fever Tmax 101.7 and slightly worsening leukocytosis.  Yesterday's ADDIS aborted, patient was unable to tolerate procedure, tachycardic heart rate 110s to 120s.  Increased O2 requirements immediately post ADDIS attempt, now O2 sats 83% on 3 L nasal cannula.  Remains otherwise hemodynamically stable, on heparin drip. VAC changed yesterday.    Objective:  Temp:  [98.5  F (36.9  C)-101.7  F (38.7  C)] 98.5  F (36.9  C)  Pulse:  [106-129] 112  Resp:  [14-25] 16  MAP:  [71 mmHg-106 mmHg] 82 mmHg  Arterial Line BP: ()/(53-78) 122/67  SpO2:  [83 %-100 %] 83 %    I/O last 3 completed shifts:  In: 842 [I.V.:525; NG/GT:80]  Out: 100 [Drains:100]      Gen: Wakes up to converse, lethargic this morning  CV: RR per DP pulse, off pressors, sinus tach per tele with PACs  Resp: On NC, non-labored  Abd: Wound vac in place, holding seal, abdomen soft to palpation around VAC   Ext: RLE wwp. LLE cool to touch, dusky. Blistering appreciated on proximal calf posteriorly. no DP/PT signals on LLE. VAC changed 10/9/23  Abdominal incision without purulence drainage, fascia remains closed, incision without signs or symptoms of infection.  Slight skin dehiscence noted on right superior aspect of incision, we will continue to monitor.                  Labs:  Recent Labs   Lab 10/10/23  0417 10/09/23  0301 10/08/23  0510   WBC 17.3* 14.4* 12.5*   HGB 7.0* 7.6* 7.1*    169 137*       Recent Labs   Lab 10/10/23  0756 10/10/23  0417 10/10/23  0415 10/09/23  0314 10/09/23  0301 10/08/23  0811 10/08/23  0510   NA  --  137  --   --  135  --  136   POTASSIUM  --  4.1  --   --  4.0  --  4.1   CHLORIDE  --  93*  --   --  97*  --  95*   CO2  --  27  --   --  23  --  22   BUN  --  94.4*  --   --  69.2*  --  116.0*   CR  --  5.28*  --   --  3.56*  --  5.08*   * 129* 123*   < > 115*   < > 145*   OMAR  --  8.6*  --   --  8.6*  --  8.1*   MAG  --  2.0   --   --  1.9  --  2.4*   PHOS  --  7.4*  --   --  5.4*  --  7.3*    < > = values in this interval not displayed.      Assessment/Plan:   68 year old male with PMH of HTN and previous TIA who presented with R sided abdominal pain found to have contained ruptured AAA, now s/p open AAA repair 9/24 into 9/25am with 30 min supraceliac clamp time, prolonged inter-renal clamp time, temporary abdominal closure. He remains critically ill in the ICU. He did have bloody stool postop with flex sig 9/25 demonstrating ischemic mucosal changes of the rectum, however on repeated abdominal looks he has had no evidence of transmural necrosis of the small or large intestine, or the rectum. On 9/29 he was started on CRRT given ongoing low UOP and rising Cr and failure of lasix challenge. Now on iHD, requiring low dose levo during sessions. Otherwise hemodynamically continues to do well off pressors. Ischemic (outside of iHD). LLE unchanged, note DVT in this leg, this is to be expected given ischemia and absent inflow. We will continue to monitor LLE given it is not currently making patient systemically ill and there is no indication for urgent intervention. S/p closure of abdominal fascia and subcutaneous tissue left open with wound VAC applied 10/2/23. Left AKA on hold at this time, patient with worsening leukocytosis, fevers, concern for aspiration.     - Appreciate excellent ICU care.  - Infectious work up per ICU  - Potential ADDIS during his L AKA (on hold for now)   - Stroke workup so far negative -- MRI with multiple small infarcts  - PE+, venous duplex with nonocclusive to occlusive thrombus of the left GSV from the level of the knee to the groin and nonocclusive thrombus of the left distal femoral vein and popliteal veins, increased in extent compared to 9/30/2023. Continue with heparin gtt.  - Goal SBP <160, MAP >65  - Per RN report NGT with >1L output, not recorded in I/O's  - NGT to LWS   - Appreciate nephrology  recommendations, HD runs  - VAC changes with vascular surgery M/W/F    Discussed pt history, exam, assessment and plan with  who will discuss with staff    Miryam Carrasco CNP  Vascular Surgery  Pager: [phone]  [email or web address]  Send message or 10 digit call back number Securely via Emissary with the Emissary Web Console (learn more here)       Statement Selected

## 2023-10-10 NOTE — CARE PLAN
Major Shift Events: RAAS 0 to -1. Oriented only to self and place. Follows simple commands. Moving all extremities purposefully. L pupil slightly > R pupil, unchanged from previous/team aware. R facial droop at baseline. Denied pain, received schedule tylenol, no PRNs given. Slept between cares. TMAX 101.5. Sinus tachycardia, 110-low 120s. SBPs mostly 140-150s. Generalized edema. On 3L nasal cannula, weak ineffective cough; unable to obtain sputum culture this shift. LS coarse. Anuric. NG to LIS with moderate amount of green/brown output. No BM this shift. Abd surgical wound to Vac Ulta, minimal output. Left leg surgical wound to Vac Ulta, scant to no output. L radial ART line, R PIV x2, L internal jugular HD line, R triple lumen subclavian. Hep gtt @ 1900.     Hbg of 7.0. SICU team notified, 1 unit RBCs ordered and transfused.       Plan: NPO for left above the knee amputation today. Plan for HD early this AM prior to procedure. Obtain sputum culture. Continue to follow POC and notify team of any changes.     For vital signs and complete assessments, please see documentation flowsheets.

## 2023-10-10 NOTE — PROGRESS NOTES
Nephrology Progress Note  10/10/2023       Alfredo Burnham is a 68 yom with complex hx of TIA, HTN, blindness who presented to [facility] 9/24 with severe abdominal pain radiating to flank and back, CT revealed AAA with a contained rupture.  Taken to OR for aortobiiliac bypass and resection of ruptured pararenal aneurysm.   Post op course complicated by hypotension requiring pressors and metabolic acidosis.  Baseline Cr 1.0 on presentation but on the rise to >5 at time of renal consult for MAYA management and possible RRT.       Interval History :   Mr Burnham was seen on HD this am in anticipation of going to OR today but this has been delayed until tomorrow.  Pulling 3L as long as BP's are stable, next planned run 10/12.      Assessment & Recommendations:   MAYA-Baseline Cr 1.0, ordered UA.  CT showed benign cyst but otherwise normal kidneys.  Cr on the rise since surgery, did receive contrast for CTA.  Urine microscopy showed granular casts suggesting ATN which will recover with time and stabilizing hemodynamics.  Started on CRRT 9/30 for volume and clearance with minimal UOP and rising Cr.                  -Started CRRT 9/30 for volume and clearance, stopped 10/4 and now running iHD PRN and watching for recovery.        -Line is RIJ temp line from 10/6                 -Dialysis consent signed and scanned into media tab.      Volume-Total body volume overloaded but much of it is likely 3rd spaced with low albumin and acute illness.  Negative nearly 1L yesterday with 2L HD run, plan for run tomorrow am.      Electrolytes-K 4.1, bicarb 27, Na 137.     BMD-Ca 8.6, Mg and Phos mildly up consistent with poor clearance.       Anemia-Hgb 7.0 and stable, ~14 on presentation, acute management per team.       Nutrition-On TF      Time spent: 30 minutes on this date of encounter for chart review, physical exam, medical decision making and co-ordination of care.      Seen and discussed with Dr Carranza     Recommendations were  "communicated to primary team via verbal communication.        Bebeto Sesay, APRN CNS  Clinical Nurse Specialist  [phone]    Review of Systems:   I reviewed the following systems:  ROS not done due to lethargy    Physical Exam:   I/O last 3 completed shifts:  In: 842 [I.V.:525; NG/GT:80]  Out: 2300 [Emesis/NG output:2200; Drains:100]   /87   Pulse (!) 123   Temp 98.5  F (36.9  C) (Axillary)   Resp 19   Ht 1.727 m (5' 8\")   Wt 79.8 kg (175 lb 14.8 oz)   SpO2 99%   BMI 26.75 kg/m       GENERAL APPEARANCE: Extubated, lethargic, in no distress.   EYES: No scleral icterus  Pulmonary: On vent 40%/8 of PEEP  CV: Regular rhythm, normal rate   - Edema +2-3 generalized, ++ scrotal edema.    GI: distended, nontender  MS: no evidence of inflammation in joints, no muscle tenderness  : No Lu  SKIN: no rash, warm, dry  NEURO: No focal deficits.         Labs:   All labs reviewed by me  Electrolytes/Renal -   Recent Labs   Lab Test 10/10/23  1131 10/10/23  0756 10/10/23  0417 10/09/23  0314 10/09/23  0301 10/08/23  0811 10/08/23  0510   NA  --   --  137  --  135  --  136   POTASSIUM  --   --  4.1  --  4.0  --  4.1   CHLORIDE  --   --  93*  --  97*  --  95*   CO2  --   --  27  --  23  --  22   BUN  --   --  94.4*  --  69.2*  --  116.0*   CR  --   --  5.28*  --  3.56*  --  5.08*   * 112* 129*   < > 115*   < > 145*   OMAR  --   --  8.6*  --  8.6*  --  8.1*   MAG  --   --  2.0  --  1.9  --  2.4*   PHOS  --   --  7.4*  --  5.4*  --  7.3*    < > = values in this interval not displayed.         CBC -   Recent Labs   Lab Test 10/10/23  0417 10/09/23  0301 10/08/23  0510   WBC 17.3* 14.4* 12.5*   HGB 7.0* 7.6* 7.1*    169 137*         LFTs -   Recent Labs   Lab Test 10/10/23  0417 10/09/23  0301 10/08/23  0510   ALKPHOS 142* 182* 193*   BILITOTAL 0.5 0.5 0.4   ALT 29 33 23   AST 42 55* 36   PROTTOTAL 5.5* 5.5* 5.1*   ALBUMIN 2.6* 2.5* 2.6*         Iron Panel - No lab results found.        Current " Medications:   acetaminophen  975 mg Oral or Feeding Tube Q6H    atorvastatin  10 mg Oral or Feeding Tube QPM    B and C vitamin Complex with folic acid  10 mL Per Feeding Tube Daily    gabapentin  300 mg Oral or Feeding Tube At Bedtime    insulin aspart  1-12 Units Subcutaneous Q4H    melatonin  5 mg Oral or Feeding Tube QPM    [Held by provider] metoprolol tartrate  12.5 mg Oral or Feeding Tube BID    nystatin  500,000 Units Oral 4x Daily    polyethylene glycol  17 g Oral or Feeding Tube Daily    protein modular  1 packet Per Feeding Tube TID    QUEtiapine  12.5-25 mg Oral or Feeding Tube At Bedtime    senna-docusate  1 tablet Oral or Feeding Tube BID    sodium chloride (PF)  3 mL Intravenous Q8H    sodium chloride (PF)  3 mL Intracatheter Q8H      dextrose      dextrose      heparin 1,900 Units/hr (10/10/23 1300)    norepinephrine Stopped (10/08/23 1050)

## 2023-10-11 ENCOUNTER — APPOINTMENT (OUTPATIENT)
Dept: GENERAL RADIOLOGY | Facility: CLINIC | Age: 68
DRG: 268 | End: 2023-10-11
Payer: COMMERCIAL

## 2023-10-11 ENCOUNTER — APPOINTMENT (OUTPATIENT)
Dept: SPEECH THERAPY | Facility: CLINIC | Age: 68
DRG: 268 | End: 2023-10-11
Payer: COMMERCIAL

## 2023-10-11 LAB
ALBUMIN SERPL BCG-MCNC: 2.6 G/DL (ref 3.5–5.2)
ALP SERPL-CCNC: 124 U/L (ref 40–129)
ALT SERPL W P-5'-P-CCNC: 28 U/L (ref 0–70)
ANION GAP SERPL CALCULATED.3IONS-SCNC: 16 MMOL/L (ref 7–15)
AST SERPL W P-5'-P-CCNC: 44 U/L (ref 0–45)
BILIRUB DIRECT SERPL-MCNC: 0.35 MG/DL (ref 0–0.3)
BILIRUB SERPL-MCNC: 0.5 MG/DL
BUN SERPL-MCNC: 65.1 MG/DL (ref 8–23)
CA-I BLD-MCNC: 4.2 MG/DL (ref 4.4–5.2)
CALCIUM SERPL-MCNC: 8.8 MG/DL (ref 8.8–10.2)
CHLORIDE SERPL-SCNC: 92 MMOL/L (ref 98–107)
CREAT SERPL-MCNC: 4.23 MG/DL (ref 0.67–1.17)
DEPRECATED HCO3 PLAS-SCNC: 31 MMOL/L (ref 22–29)
EGFRCR SERPLBLD CKD-EPI 2021: 14 ML/MIN/1.73M2
ERYTHROCYTE [DISTWIDTH] IN BLOOD BY AUTOMATED COUNT: 19 % (ref 10–15)
GLUCOSE BLDC GLUCOMTR-MCNC: 109 MG/DL (ref 70–99)
GLUCOSE BLDC GLUCOMTR-MCNC: 114 MG/DL (ref 70–99)
GLUCOSE BLDC GLUCOMTR-MCNC: 117 MG/DL (ref 70–99)
GLUCOSE BLDC GLUCOMTR-MCNC: 118 MG/DL (ref 70–99)
GLUCOSE BLDC GLUCOMTR-MCNC: 126 MG/DL (ref 70–99)
GLUCOSE SERPL-MCNC: 127 MG/DL (ref 70–99)
HCT VFR BLD AUTO: 22.2 % (ref 40–53)
HGB BLD-MCNC: 7.4 G/DL (ref 13.3–17.7)
LACTATE SERPL-SCNC: 0.9 MMOL/L (ref 0.7–2)
MAGNESIUM SERPL-MCNC: 1.9 MG/DL (ref 1.7–2.3)
MCH RBC QN AUTO: 31.4 PG (ref 26.5–33)
MCHC RBC AUTO-ENTMCNC: 33.3 G/DL (ref 31.5–36.5)
MCV RBC AUTO: 94 FL (ref 78–100)
PHOSPHATE SERPL-MCNC: 6.7 MG/DL (ref 2.5–4.5)
PLATELET # BLD AUTO: 192 10E3/UL (ref 150–450)
POTASSIUM SERPL-SCNC: 3.7 MMOL/L (ref 3.4–5.3)
PROT SERPL-MCNC: 5.8 G/DL (ref 6.4–8.3)
RBC # BLD AUTO: 2.36 10E6/UL (ref 4.4–5.9)
SODIUM SERPL-SCNC: 139 MMOL/L (ref 135–145)
UFH PPP CHRO-ACNC: 0.52 IU/ML
WBC # BLD AUTO: 14 10E3/UL (ref 4–11)

## 2023-10-11 PROCEDURE — 250N000009 HC RX 250

## 2023-10-11 PROCEDURE — 250N000013 HC RX MED GY IP 250 OP 250 PS 637

## 2023-10-11 PROCEDURE — 83605 ASSAY OF LACTIC ACID: CPT | Performed by: PHYSICIAN ASSISTANT

## 2023-10-11 PROCEDURE — 250N000013 HC RX MED GY IP 250 OP 250 PS 637: Performed by: PHARMACIST

## 2023-10-11 PROCEDURE — 83735 ASSAY OF MAGNESIUM: CPT | Performed by: PHYSICIAN ASSISTANT

## 2023-10-11 PROCEDURE — 85520 HEPARIN ASSAY: CPT | Performed by: SURGERY

## 2023-10-11 PROCEDURE — 84100 ASSAY OF PHOSPHORUS: CPT | Performed by: PHYSICIAN ASSISTANT

## 2023-10-11 PROCEDURE — 85027 COMPLETE CBC AUTOMATED: CPT

## 2023-10-11 PROCEDURE — 92610 EVALUATE SWALLOWING FUNCTION: CPT | Mod: GN

## 2023-10-11 PROCEDURE — 82330 ASSAY OF CALCIUM: CPT | Performed by: PHYSICIAN ASSISTANT

## 2023-10-11 PROCEDURE — 250N000013 HC RX MED GY IP 250 OP 250 PS 637: Performed by: SURGERY

## 2023-10-11 PROCEDURE — 250N000011 HC RX IP 250 OP 636: Mod: JZ

## 2023-10-11 PROCEDURE — 82040 ASSAY OF SERUM ALBUMIN: CPT

## 2023-10-11 PROCEDURE — 200N000002 HC R&B ICU UMMC

## 2023-10-11 PROCEDURE — 92526 ORAL FUNCTION THERAPY: CPT | Mod: GN

## 2023-10-11 PROCEDURE — 99291 CRITICAL CARE FIRST HOUR: CPT | Mod: GC | Performed by: SURGERY

## 2023-10-11 PROCEDURE — 71045 X-RAY EXAM CHEST 1 VIEW: CPT | Mod: 26 | Performed by: RADIOLOGY

## 2023-10-11 PROCEDURE — 80053 COMPREHEN METABOLIC PANEL: CPT | Performed by: PHYSICIAN ASSISTANT

## 2023-10-11 PROCEDURE — 99233 SBSQ HOSP IP/OBS HIGH 50: CPT | Mod: FS | Performed by: CLINICAL NURSE SPECIALIST

## 2023-10-11 PROCEDURE — 71045 X-RAY EXAM CHEST 1 VIEW: CPT

## 2023-10-11 RX ADMIN — NYSTATIN 500000 UNITS: 100000 SUSPENSION ORAL at 08:23

## 2023-10-11 RX ADMIN — Medication 25 MG: at 21:31

## 2023-10-11 RX ADMIN — Medication 10 ML: at 08:10

## 2023-10-11 RX ADMIN — HEPARIN SODIUM 1900 UNITS/HR: 10000 INJECTION, SOLUTION INTRAVENOUS at 11:58

## 2023-10-11 RX ADMIN — NYSTATIN 500000 UNITS: 100000 SUSPENSION ORAL at 20:02

## 2023-10-11 RX ADMIN — ATORVASTATIN CALCIUM 10 MG: 10 TABLET, FILM COATED ORAL at 20:02

## 2023-10-11 RX ADMIN — NYSTATIN 500000 UNITS: 100000 SUSPENSION ORAL at 11:52

## 2023-10-11 RX ADMIN — ACETAMINOPHEN 975 MG: 325 TABLET, FILM COATED ORAL at 14:24

## 2023-10-11 RX ADMIN — ACETAMINOPHEN 975 MG: 325 TABLET, FILM COATED ORAL at 20:01

## 2023-10-11 RX ADMIN — LIDOCAINE HYDROCHLORIDE: 20 JELLY TOPICAL at 12:12

## 2023-10-11 RX ADMIN — ACETAMINOPHEN 975 MG: 325 TABLET, FILM COATED ORAL at 08:10

## 2023-10-11 RX ADMIN — Medication 5 MG: at 20:02

## 2023-10-11 RX ADMIN — GABAPENTIN 300 MG: 300 CAPSULE ORAL at 21:31

## 2023-10-11 RX ADMIN — NYSTATIN 500000 UNITS: 100000 SUSPENSION ORAL at 16:11

## 2023-10-11 RX ADMIN — ACETAMINOPHEN 975 MG: 325 TABLET, FILM COATED ORAL at 01:06

## 2023-10-11 RX ADMIN — LIDOCAINE HYDROCHLORIDE: 20 JELLY TOPICAL at 16:21

## 2023-10-11 ASSESSMENT — ACTIVITIES OF DAILY LIVING (ADL)
ADLS_ACUITY_SCORE: 49
ADLS_ACUITY_SCORE: 45
ADLS_ACUITY_SCORE: 47
ADLS_ACUITY_SCORE: 45
ADLS_ACUITY_SCORE: 49
ADLS_ACUITY_SCORE: 45

## 2023-10-11 NOTE — PROGRESS NOTES
Nephrology Progress Note  10/11/2023       Alfredo Burnham is a 68 yom with complex hx of TIA, HTN, blindness who presented to [facility] 9/24 with severe abdominal pain radiating to flank and back, CT revealed AAA with a contained rupture.  Taken to OR for aortobiiliac bypass and resection of ruptured pararenal aneurysm.   Post op course complicated by hypotension requiring pressors and metabolic acidosis.  Baseline Cr 1.0 on presentation but on the rise to >5 at time of renal consult for MAYA management and possible RRT.       Interval History :   Mr Burnham had CRRT stopped 10/4, stable on iHD since and ran yesterday.  No issue pushing for run today, will plan for tomorrow.  Vas surgery holding off on amputation and giving it some more time as he may have some improved perfusion.      Assessment & Recommendations:   MAYA-Baseline Cr 1.0, ordered UA.  CT showed benign cyst but otherwise normal kidneys.  Cr on the rise since surgery, did receive contrast for CTA.  Urine microscopy showed granular casts suggesting ATN which will recover with time and stabilizing hemodynamics.  Started on CRRT 9/30 for volume and clearance with minimal UOP and rising Cr.                  -Started CRRT 9/30 for volume and clearance, stopped 10/4 and now running iHD PRN and watching for recovery.        -Line is [facility] temp line from 10/6                 -Dialysis consent signed and scanned into media tab.      Volume-Total body volume overloaded but much of it is likely 3rd spaced with low albumin and acute illness.  Pulled 3L yesterday without issue but will be more gentle tomorrow as BP's are a bit lower.      Electrolytes-K 3.7, bicarb 31, Na 139.     BMD-Ca 8.8, Mg and Phos mildly up consistent with poor clearance.       Anemia-Hgb 7.4 and stable, ~14 on presentation, acute management per team.       Nutrition-On TF      Time spent: 40 minutes on this date of encounter for chart review, physical exam, medical decision making and  "co-ordination of care.      Seen and discussed with Dr Carranza     Recommendations were communicated to primary team via verbal communication.        ALTAGRACIA Jiménez CNS  Clinical Nurse Specialist  [phone]    Review of Systems:   I reviewed the following systems:  ROS not done due to lethargy    Physical Exam:   I/O last 3 completed shifts:  In: 858 [I.V.:528; NG/GT:330]  Out: 6100 [Emesis/NG output:3000; Drains:100; Other:3000]   /87   Pulse 116   Temp 98.2  F (36.8  C) (Oral)   Resp 20   Ht 1.727 m (5' 8\")   Wt 79.1 kg (174 lb 6.1 oz)   SpO2 92%   BMI 26.51 kg/m       GENERAL APPEARANCE: Extubated, lethargic, in no distress.   EYES: No scleral icterus  Pulmonary: On vent 40%/8 of PEEP  CV: Regular rhythm, normal rate   - Edema +2-3 generalized, ++ scrotal edema.    GI: distended, nontender  MS: no evidence of inflammation in joints, no muscle tenderness  : No Lu  SKIN: no rash, warm, dry  NEURO: No focal deficits.         Labs:   All labs reviewed by me  Electrolytes/Renal -   Recent Labs   Lab Test 10/11/23  1155 10/11/23  0812 10/11/23  0341 10/11/23  0312 10/10/23  0756 10/10/23  0417 10/09/23  0314 10/09/23  0301   NA  --   --   --  139  --  137  --  135   POTASSIUM  --   --   --  3.7  --  4.1  --  4.0   CHLORIDE  --   --   --  92*  --  93*  --  97*   CO2  --   --   --  31*  --  27  --  23   BUN  --   --   --  65.1*  --  94.4*  --  69.2*   CR  --   --   --  4.23*  --  5.28*  --  3.56*   * 117* 114* 127*   < > 129*   < > 115*   OMAR  --   --   --  8.8  --  8.6*  --  8.6*   MAG  --   --   --  1.9  --  2.0  --  1.9   PHOS  --   --   --  6.7*  --  7.4*  --  5.4*    < > = values in this interval not displayed.       CBC -   Recent Labs   Lab Test 10/11/23  0312 10/10/23  0417 10/09/23  0301   WBC 14.0* 17.3* 14.4*   HGB 7.4* 7.0* 7.6*    182 169       LFTs -   Recent Labs   Lab Test 10/11/23  0312 10/10/23  0417 10/09/23  0301   ALKPHOS 124 142* 182*   BILITOTAL 0.5 0.5 0.5 "   ALT 28 29 33   AST 44 42 55*   PROTTOTAL 5.8* 5.5* 5.5*   ALBUMIN 2.6* 2.6* 2.5*       Iron Panel - No lab results found.        Current Medications:   acetaminophen  975 mg Oral or Feeding Tube Q6H    atorvastatin  10 mg Oral or Feeding Tube QPM    gabapentin  300 mg Oral or Feeding Tube At Bedtime    insulin aspart  1-12 Units Subcutaneous Q4H    melatonin  5 mg Oral or Feeding Tube QPM    nystatin  500,000 Units Oral 4x Daily    polyethylene glycol  17 g Oral or Feeding Tube Daily    QUEtiapine  12.5-25 mg Oral or Feeding Tube At Bedtime    senna-docusate  1 tablet Oral or Feeding Tube BID    sodium chloride (PF)  3 mL Intravenous Q8H    sodium chloride (PF)  3 mL Intracatheter Q8H      dextrose      dextrose      heparin 1,900 Units/hr (10/11/23 1400)

## 2023-10-11 NOTE — PROGRESS NOTES
Vascular Surgery Progress Note  10/11/2023       Subjective:  Resting comfortably in bed, conducts conversation well keeping his eyes closed with hoarse voice, overall weak. Recalled that his wife and daughter's names, thought he was in [place], otherwise with appropriate thought process.  NGT with 2.7L output overnight.  Yesterday SLP was deferred due to ongoing NGT large output, remains hemodynamically stable. Now afebrile, on O2 at 3L NC, improving leukocytosis with stable therapeutic heparin drip. Unmeasured urine volume x1 yesterday.     VAC output was 50 mL in the last 24 hours.    Objective:  Temp:  [98.5  F (36.9  C)-99.8  F (37.7  C)] 99.6  F (37.6  C)  Pulse:  [108-127] 113  Resp:  [13-25] 18  MAP:  [64 mmHg-98 mmHg] 71 mmHg  Arterial Line BP: ()/(53-71) 102/55  SpO2:  [90 %-100 %] 97 %    I/O last 3 completed shifts:  In: 858 [I.V.:528; NG/GT:330]  Out: 6100 [Emesis/NG output:3000; Drains:100; Other:3000]      Gen: Wakes up to converse, resting comfortably in bed in ICU.  CV: RR per DP pulse, off pressors, sinus tach per tele with PACs  Resp: On NC, non-labored  Abd: Wound vac in place, holding seal, abdomen soft to palpation around VAC   Ext: RLE wwp. LLE cool to touch, dusky. Blistering appreciated from knee to foot. VAC changes M/W/F  Abdominal incision without purulence drainage, fascia remains closed, incision without signs or symptoms of infection.  Slight skin dehiscence noted on right superior aspect of incision, we will continue to monitor.    Labs:  Recent Labs   Lab 10/11/23  0312 10/10/23  0417 10/09/23  0301   WBC 14.0* 17.3* 14.4*   HGB 7.4* 7.0* 7.6*    182 169       Recent Labs   Lab 10/11/23  0812 10/11/23  0341 10/11/23  0312 10/10/23  0756 10/10/23  0417 10/09/23  0314 10/09/23  0301   NA  --   --  139  --  137  --  135   POTASSIUM  --   --  3.7  --  4.1  --  4.0   CHLORIDE  --   --  92*  --  93*  --  97*   CO2  --   --  31*  --  27  --  23   BUN  --   --  65.1*  --  94.4*   --  69.2*   CR  --   --  4.23*  --  5.28*  --  3.56*   * 114* 127*   < > 129*   < > 115*   OMAR  --   --  8.8  --  8.6*  --  8.6*   MAG  --   --  1.9  --  2.0  --  1.9   PHOS  --   --  6.7*  --  7.4*  --  5.4*    < > = values in this interval not displayed.      Assessment/Plan:   68 year old male with PMH of HTN and previous TIA who presented with R sided abdominal pain found to have contained ruptured AAA, now s/p open AAA repair 9/24 into 9/25am with 30 min supraceliac clamp time, prolonged inter-renal clamp time, temporary abdominal closure. He remains critically ill in the ICU. He did have bloody stool postop with flex sig 9/25 demonstrating ischemic mucosal changes of the rectum, however on repeated abdominal looks he has had no evidence of transmural necrosis of the small or large intestine, or the rectum. On 9/29 he was started on CRRT given ongoing low UOP and rising Cr and failure of lasix challenge. Now on iHD, requiring low dose levo during sessions. Otherwise hemodynamically continues to do well off pressors. Ischemic (outside of iHD). LLE unchanged, note DVT in this leg, this is to be expected given ischemia and absent inflow. We will continue to monitor LLE given it is not currently making patient systemically ill and there is no indication for urgent intervention. S/p closure of abdominal fascia and subcutaneous tissue left open with wound VAC applied 10/2/23. Left AKA on hold at this time, as leukocytosis improves and supplemental oxygen requirement downtrend. Continues to have large NGT output, high risk for aspiration and further worsening infectious process.    - Appreciate excellent ICU care.  - L AKA on hold for now, potentially next week on Monday, planning for block prior to surgery with RAPS  - Stroke workup negative -- MRI with multiple small infarcts  - PE+, venous duplex with nonocclusive to occlusive thrombus of the left GSV from the level of the knee to the groin and nonocclusive  thrombus of the left distal femoral vein and popliteal veins, increased in extent compared to 9/30/2023. Continue with heparin gtt.  - Goal SBP <160, MAP >65  - Continue with NGT, output 2.7L in the last 24hrs. NGT to LWS   - NPO  - Appreciate nephrology recommendations, HD runs  - VAC changes with vascular surgery M/W/F  - Out of bed today to chair, recommend PT/OT, passive ROM    Discussed pt history, exam, assessment and plan with Dr.Cirillo-Penn Miryam Carrasco, [employer]  Vascular Surgery  Pager: [phone]  [email or web address]  Send message or 10 digit call back number Securely via [employer]! with the [employer]! Web Console (learn more here)

## 2023-10-11 NOTE — PLAN OF CARE
"Goal Outcome Evaluation:      Plan of Care Reviewed With: patient, spouse, family    Overall Patient Progress: no change      Major Shift Events:  Care assumed 8382-1006. Up in chair with lift, attempted to use bedpan with smear result. Small wet area on chux x1, appears to be urine, bladder scan for 32mL this evening. Lethargic, disoriented to time, situation, states \"hospital\" for place but does not know state or city. NG to LIS, TF remains on hold from 10/10. Heparin therapeutic at 1,900 units/hr. Endorses mild pain this evening, declined medications. Family updated at bedside.    Plan: Possibly OR Monday for L AKA. Encourage activity, work with PT and SLP. Continue with POC.  For vital signs and complete assessments, please see documentation flowsheets.     "

## 2023-10-11 NOTE — PLAN OF CARE
Major Shift Events: RAAS 0 to -1. Alert and oriented x2, to self and place. Follows simple commands. Moving all extremities purposefully, weak in LLE. Whispers/hoarse voice. Denied pain, received schedule tylenol, no PRNs given. Minimal sleep between cares. Afebrile. Sinus tachycardia, 110-120s, frequent PVCs. On 3L nasal cannula, weak ineffective cough; unable to obtain sputum culture. No void this shift. NG to LIS with high output (1100 ml this shift). Loose/watery BM x3. Abd surgical wound to Vac Ultra, minimal output. LLE surgical wound to Vac Ultra, scant to no output. L radial ART line, R PIV x2, L internal jugular HD line, R triple lumen subclavian. Hep gtt therapeutic @ 1900.       Plan: Potentially go to OR today. Obtain sputum/respiratory culture. Continue to follow POC and notify team of any changes.     For vital signs and complete assessments, please see documentation flowsheets.

## 2023-10-11 NOTE — PROGRESS NOTES
10/11/23 1300   Appointment Info   Signing Clinician's Name / Credentials (SLP) Yoli Gleason MA CCC-SLP   General Information   Onset of Illness/Injury or Date of Surgery 09/24/23   Referring Physician Clint Braga MD   Patient/Family Therapy Goal Statement (SLP) None stated   Pertinent History of Current Problem Pt is a 68 year old male who was admitted to the SICU on 9/24/2023 s/p open AAA repair. Patient has a history of HTN and previous TIA. He presented to the ED on 9/24 with right sided abdominal pain and was found to have a contained, ruptured AAA on CT. 30 min super-celiac clamp time. Prolonged intra-renal clamp. 9/25 s/p flex sig showing rectal ischemia, abdominal washout showing a hemostatic AAA graft and no evidence of transmural colonic or small bowel ischemia, and an unsuccessful LLE embolectomy. 9/26 s/p repeat abdominal washout, retroperitoneal closure, LLE wound washout. 9/29 s/p repeat abd washout, bowel appeared well perfused w/o notable ischemia. OR 10/2 for abdominal fascia closure. Extubated 10/4. Unequal pupils and R facial droop, MRI brain showed multiple small infarcts, likely embolic. Clinical swallow eval completed per MD order.   General Observations Upon SLP arrival, pt positioned upright in bed and willing to participate. NGT w/ minimal output this date and is clamped. Per RN, pt OK for PO trials.   Type of Evaluation   Type of Evaluation Swallow Evaluation   Oral Motor   Oral Musculature generally intact   Structural Abnormalities none present   Mucosal Quality dry   Oral Motor Deficits Observed Vocal Quality/Secretion Management (Oral Motor) (Group)   Dentition (Oral Motor)   Dentition (Oral Motor) natural dentition;adequate dentition   Facial Symmetry (Oral Motor)   Facial Symmetry (Oral Motor) right side impairment   Right Side Facial Asymmetry minimal impairment   Lip Function (Oral Motor)   Lip Range of Motion (Oral Motor) WNL   Tongue Function (Oral Motor)   Tongue ROM (Oral  Motor) WNL   Jaw Function (Oral Motor)   Jaw Function (Oral Motor) WNL   Facial Sensation   Facial Sensation WNL   Vocal Quality/Secretion Management (Oral Motor)   Vocal Quality (Oral Motor) dysphonic;hoarse   Secretion Management (Oral Motor) WNL   General Swallowing Observations   Past History of Dysphagia No hx of dysphagia per chart review   Respiratory Support nasal cannula   Current Diet/Method of Nutritional Intake (General Swallowing Observations, NIS) NPO   Swallowing Evaluation Clinical swallow evaluation   Clinical Swallow Evaluation   Feeding Assistance dependent   Clinical Swallow Evaluation Textures Trialed thin liquids;mildly thick liquids;pureed   Clinical Swallow Eval: Thin Liquid Texture Trial   Mode of Presentation, Thin Liquids cup;spoon;fed by clinician;straw   Volume of Liquid or Food Presented 4 oz   Oral Phase of Swallow WFL   Pharyngeal Phase of Swallow impaired;coughing/choking   Diagnostic Statement Overt s/sx of aspiration.   Clinical Swallow Eval: Mildly Thick Liquids   Mode of Presentation cup;spoon;straw;fed by clinician   Volume Presented 4 oz   Oral Phase WFL   Pharyngeal Phase impaired;coughing/choking   Diagnostic Statement Overt s/sx of aspiration.   Clinical Swallow Evaluation: Puree Solid Texture Trial   Mode of Presentation, Puree spoon;fed by clinician   Volume of Puree Presented 3 tbsp   Oral Phase, Puree WFL   Pharyngeal Phase, Puree intact   Diagnostic Statement No overt s/sx of aspiration   Esophageal Phase of Swallow   Patient reports or presents with symptoms of esophageal dysphagia No   Swallowing Recommendations   Diet Consistency Recommendations NPO;ice chips only   Medication Administration Recommendations, Swallowing (SLP) None orally   General Therapy Interventions   Planned Therapy Interventions Dysphagia Treatment   Dysphagia treatment Oropharyngeal exercise training;Modified diet education;Instruction of safe swallow strategies;Compensatory strategies for  swallowing   Clinical Impression   Criteria for Skilled Therapeutic Interventions Met (SLP Eval) Yes, treatment indicated   SLP Diagnosis mod-severe oropharyngeal dysphagia   Problem List (SLP) Below baseline swallowing   Risks & Benefits of therapy have been explained evaluation/treatment results reviewed;care plan/treatment goals reviewed;risks/benefits reviewed;current/potential barriers reviewed;participants voiced agreement with care plan;participants included;patient   Clinical Impression Comments Clinical swallow evaluation completed per MD order. Pt presents w/ mod-severe oropharyngeal dysphagia in setting of generalized weakness and lengthy intubation (~10 days). Oral mech exam remarkable for dysphonic vocal quality, reduced cough strength, and mild R sided facial weakness. Pt assessed w/ thin and mildly thick liquids and puree. Oral phase WFL. Pharyngeal phase remarkable for overt s/sx of aspiration evidence by delayed coughing w/ thin and mildly thick liquids. Solids not trialed w/ c/f aspiration. Recommend NPO status, ice chips OK for comfort after oral cares. SLP to follow for PO readiness.   SLP Total Evaluation Time   Eval: oral/pharyngeal swallow function, clinical swallow Minutes (78163) 16   Total Session Time   Total Session Time (sum of timed and untimed services) 20

## 2023-10-11 NOTE — PROGRESS NOTES
SURGICAL ICU PROGRESS NOTE  10/11/2023        Date of Service (when I saw the patient): 10/11/2023    ASSESSMENT:   Alfredo Burnham is a 68 year old male who was admitted to the SICU on 9/24/2023 s/p open AAA repair. Patient has a history of HTN and previous TIA. He presented to the ED on 9/24 with right sided abdominal pain and was found to have a contained, ruptured AAA on CT. 30 min super-celiac clamp time. Prolonged intra-renal clamp. 9/25 s/p flex sig showing rectal ischemia, abdominal washout showing a hemostatic AAA graft and no evidence of transmural colonic or small bowel ischemia, and an unsuccessful LLE embolectomy. 9/26 s/p repeat abdominal washout, retroperitoneal closure, LLE wound washout. 9/29 s/p repeat abd washout, bowel appeared well perfused w/o notable ischemia. OR 10/2 for abdominal fascia closure. Extubated 10/4. Unequal pupils and R facial droop, MRI brain showed multiple small infarcts, likely embolic.     CHANGES and MAJOR THINGS TODAY:   - L above knee amputation likely 10/16  - remain NPO  - Sputum culture if possible    PLAN:    Neurological:  # Acute pain  - Multi-modal pain control effective (scheduled tylenol, oxycodone PRN, dilaudid PRN, gabapentin TID, robaxin PRN)  - Nerve block delayed until amputation day, pending scheduling (likely 10/16)     #Facial droop, left lacunar infarct  #Punctate lesions of cerebellum   - high dose heparin   - ADDIS ideally intra-op  - Follow-up with stroke neurology 4-6 weeks after discharge  - neruocrit ok with anticoagulation (now signed off)     # Delirium  # Mixed metabolic encephalopathy   # Sedation, RASS goal 0 to -1  - Monitor neurological status. Delirium preventions and precautions  - Melatonin q6pm and seroquel for sleep aid  - Atarax PRN for anxiety     Pulmonary:  # Acute hypoxic respiratory failure  # s/p extubation 10/3  Satting 93-98% overnight.   - Nasal cannula @ 3 LPM; continue to wean down as tolerated  - Head of bed elevation    - CXR not worsening    Cardiovascular:    # Contained AAA rupture s/p open repair  # Suspected emboli to LLE, LLE ischemia  # Hx of HTN  # Tachycardia  Hgb stable this morning at 7.4.   - Continue to monitor hemodynamic status; Goal MAP >65, SBP <160  - L AKA delayed, not yet scheduled  - high dose heparin   - Continue Lipitor 10 mg, metoprolol 12.5 BID  - ADDIS ideally intra-op    GI/Nutrition:    # s/p open AAA repair, abthera device in place  # Post-operative melena, resolved  # Rectal ischemia, concern for, resolved  High NG output, 2.7L on LIS yesterday, 1.1 L overnight. Tip of enteral tube projects over stomach via abd xray  - tube feeds held considering high NG output  - Continue bowel regimen  - Ok for ice chips by mouth per speech    Renal/Fluids/Electrolytes:  # MAYA  # Oliguria  Cr at 4.2 this morning after dialysis yesterday. Pulled 3L of fluid.  - Dialysis management per nephrology, iHD  - Daily straight cath    Endocrine:  # Stress hyperglycemia  BG in the 115-127 over last 24h, no units of aspart given  - Q6 BG checks, continue high dose sliding scale insulin.   - no changes to regimen needed     Infectious disease:   # Fever, improving    # Likely aspiration event during ADDIS  White count falling to 14 (from 17) and afebrile overnight. Tachycardia continues (110's-120's); given improvement in labs and vitals, antibiotics are not indicated.   S/p cefepime, vancomycin, metronidazole, and voriconazole for positive BAL (aspergillus and candida) and fever of unknown origin  - Sputum culture  - Nystatin for thrush    Hematology:    # Acute blood loss anemia  # Thrombocytopenia (resolved)  # LLE ischemia/ DVT  # PE  - Transfused unit of pRBC for hgb=7.0  - High dose heparin (to be held at 0600)  - Hydrocortisone taper completed 10/5  - Considered CT CAP per neuro crit recommendation to assess for malignancy, given recent CT CAP 9/24 which showed no malignancy, likely minimal utility. Infarcts resemble  microemboli of atherosclerotic origin which may be secondary to AAA repair.     Musculoskeletal:  # Weakness and deconditioning of critical illness  # Left lower limb ischemia   - Passive ROM at bedside with RN  Marisol WINSLOW likely Monday 10/16; planning for block prior to surgery with RAPS  - Physical therapy   - Podiatry consult for nail clipping     Skin:  # Pressure ulcer R heel   - WOC seeing and managing     General Cares/Prophylaxis:    DVT Prophylaxis: Pneumatic Compression Devices and heparin drip  GI Prophylaxis: Not indicated  Restraints: Restraints for medical healing needed: YES    Lines/ tubes/ drains:  - PIV x2  - RIJ Dialysis line  - L triple lumen catheter  - L radial arterial line  - VAC on abdomen and L leg    Disposition:  - Surgical ICU     Patient seen, findings and plan discussed with surgical ICU staff, Dr. SAAVEDRA.      Jenni Mora, MS4  Surgical ICU     ====================================  INTERVAL HISTORY:   Had an uneventful night. Following simple commands, pain well controlled. No episodes of emesis. Afebrile but remained tachycardic.    ROS unable to be performed due to unintelligible whispering      OBJECTIVE:   1. VITAL SIGNS:   Temp:  [98.5  F (36.9  C)-99.9  F (37.7  C)] 98.9  F (37.2  C)  Pulse:  [108-127] 108  Resp:  [13-25] 16  MAP:  [64 mmHg-106 mmHg] 90 mmHg  Arterial Line BP: ()/(53-75) 147/64  SpO2:  [83 %-100 %] 93 %  Resp: 16      2. INTAKE/ OUTPUT:   I/O last 3 completed shifts:  In: 1092 [I.V.:525; NG/GT:330]  Out: 5800 [Emesis/NG output:2750; Drains:50; Other:3000]    3. PHYSICAL EXAMINATION:  General: Nasal cannula, laying in bed. Keeps eyes closed. Can follow simple commands. Speech difficult to understand.  HEENT: Normocephalic, atraumatic. RIJ dialysis line  Neuro: Moving all extremities spontaneously (aside from necrotic left lower leg), pupils reactive, EOMI, can raise eyebrows and weakly smile   Pulm/Resp: Clear lung fields  CV: Mild tachycardic with  occasional PVCs  Abdomen: Fascia closed with VAC in place, serosanguinous output, soft, mildly distended, soft  :  deferred  MSK/Extremities: RLE warm to palpation with multiphasic DP, edematous. LLE cool to touch, dusky, no signals distal of popliteal. VAC on LLE fasciotomy sites    4. INVESTIGATIONS:   Arterial Blood Gases   No lab results found in last 7 days.  Complete Blood Count   Recent Labs   Lab 10/11/23  0312 10/10/23  0417 10/09/23  0301 10/08/23  0510   WBC 14.0* 17.3* 14.4* 12.5*   HGB 7.4* 7.0* 7.6* 7.1*    182 169 137*     Basic Metabolic Panel  Recent Labs   Lab 10/11/23  0341 10/11/23  0312 10/10/23  2324 10/10/23  1938 10/10/23  0756 10/10/23  0417 10/09/23  0314 10/09/23  0301 10/08/23  0811 10/08/23  0510   NA  --  139  --   --   --  137  --  135  --  136   POTASSIUM  --  3.7  --   --   --  4.1  --  4.0  --  4.1   CHLORIDE  --  92*  --   --   --  93*  --  97*  --  95*   CO2  --  31*  --   --   --  27  --  23  --  22   BUN  --  65.1*  --   --   --  94.4*  --  69.2*  --  116.0*   CR  --  4.23*  --   --   --  5.28*  --  3.56*  --  5.08*   * 127* 113* 125*   < > 129*   < > 115*   < > 145*    < > = values in this interval not displayed.     Liver Function Tests  Recent Labs   Lab 10/11/23  0312 10/10/23  0417 10/09/23  0301 10/08/23  0510 10/08/23  0005 10/07/23  0428 10/06/23  2155   AST 44 42 55* 36  --    < >  --    ALT 28 29 33 23  --    < >  --    ALKPHOS 124 142* 182* 193*  --    < >  --    BILITOTAL 0.5 0.5 0.5 0.4  --    < >  --    ALBUMIN 2.6* 2.6* 2.5* 2.6*  --    < >  --    INR  --   --   --   --  1.13  --  1.19*    < > = values in this interval not displayed.     Pancreatic Enzymes  No lab results found in last 7 days.  Coagulation Profile  Recent Labs   Lab 10/08/23  0005 10/06/23  2155   INR 1.13 1.19*   PTT 81* 31         5. RADIOLOGY:   Recent Results (from the past 24 hour(s))   XR Chest Port 1 View    Narrative    Exam: XR CHEST PORT 1 VIEW, 10/10/2023 9:02  AM    Comparison: Chest x-ray dated 10/9/2023, 10/8/2023 and multiple  priors.    History: Admitted to the ICU for an open AAA repair and suspected  emboli to left lower extremity with ischemia. Concern for pneumonia    Findings:  Portable AP view of the chest. Right and left IJ catheter terminates  at the upper SVC. Enteric tube in stable position. Cardiac silhouette  is within normal limits. Low lung volumes. Small left pleural  effusions. Silhouetting of the left hemidiaphragm. Persistence of  perihilar and bibasilar streaky opacities. No acute osseous  abnormality.      Impression    Impression: Persistent low lung volumes and pulmonary opacities likely  due to atelectasis and/or pulmonary edema but cannot exclude  infectious process. Stable small left pleural effusion. Positioning of  support devices are unchanged.     I have personally reviewed the examination and initial interpretation  and I agree with the findings.    SABINO MURRIETA DO         SYSTEM ID:  [id number]   XR Abdomen Port 1 View    Narrative    EXAM: Abdominal radiograph 10/10/2023 9:05 AM    HISTORY: assess distention.    COMPARISON: 10/9/2023.    TECHNIQUE: Portable frontal supine view(s) of the abdomen.    FINDINGS: Partially visualized enteric tube tip and sidehole  projecting over the stomach. There is a relative paucity of bowel gas  in the left hemiabdomen. Nonobstructive bowel gas pattern. There is no  pneumatosis or portal venous gas where visualized. Surgical clips  project over the left abdomen. No acute osseous abnormality.      Impression    IMPRESSION: No obstructive bowel gas pattern.    I have personally reviewed the examination and initial interpretation  and I agree with the findings.    JOAN LEBRON MD         SYSTEM ID:  [id number]   XR Abdomen Port 1 View    Narrative    Exam: XR ABDOMEN PORT 1 VIEW, 10/10/2023 6:39 PM    Indication: Feeding tube    Comparison: Same day abdominal radiograph    Findings:   Portable AP  supine radiograph the abdomen. Gastric tube tip and  sidehole projecting over the stomach. Nonobstructive bowel gas  pattern. No pneumatosis or portal venous gas. Surgical clips project  over the midline abdomen. Visualized lung bases are clear. No acute  osseous abnormality.      Impression    Impression: Gastric tube tip and sidehole project over the stomach. No  additional feeding tube visualized.    I have personally reviewed the examination and initial interpretation  and I agree with the findings.    FILOMENA WAHL MD         SYSTEM ID:  [id number]       =========================================    I saw and evaluated the patient in an independent visit and agree with the student's assessment and plan as written above. I was present at all times for any mentioned procedures.     Clint Braga MD  PGY-1, Neurosurgery  Off-service on SICU

## 2023-10-11 NOTE — PROGRESS NOTES
CLINICAL NUTRITION SERVICES - REASSESSMENT NOTE     Nutrition Prescription    RECOMMENDATIONS FOR MDs/PROVIDERS TO ORDER:  Consider TPN if unable to resume enteral feeds - pt with severe malnutrition    Malnutrition Status:    Severe malnutrition in the context of acute illness    Recommendations already ordered by Registered Dietitian (RD):  - Updated EN order to read HOLD  - Discontinued protein modular and nephronex d/t NPO status    Future/Additional Recommendations:  - Resume EN as able.  Pt would benefit from post pyloric FT access (prefer to place post ADDIS): goal of Novasource Renal (or equivalent) @ 35 ml/hr (840 ml) + 3 pkts prosource TF 20 provides 1920 kcal (27 kcal/kg), 136 g pro (1.9 g/kg), 154 g CHO, 602 ml free water, and 0 g fiber daily.    - if PN becomes plan of care:    1. Dosing weight:  72 kg   2. Access: Central     3. Initial parameters (per day)   Volume:  Max concentrated   Dextrose: 200 g   AA: 140 g   Lipids: 250 ml 20% clinolipid    4. Dextrose titration:   Monitor lytes and if within acceptable parameters (Mg++ > or = 1.5, K+ is > or = 3, and PO4 > or = 1.9), increase dextrose by 40g to goal of 240 g dextrose.     5. Additives: Infuvite, MTE daily     Goal PN provides 240 g dextrose, 140 g AA, and daily 250ml 20% clinolipid for total provision of  1876 Kcals (25 Kcals/kg), 2 g/kg protein, GIR 2.3 mg/kg/minute, and 28% fat kcals on average daily.      EVALUATION OF THE PROGRESS TOWARD GOALS   Diet: NPO  Nutrition Support:     PARENTERAL:   Prior TPN course 9/27-10/3    ENTERAL:   10/2 - 10/5: Pivot 1.5 Juvencio (or equivalent) @ goal of  50ml/hr  (1200ml/day) + 3 pkts prosource TF20 provides: 2040 kcals (28kcal/kg), 172 g PRO (2.4g/kg), 900 ml free H20, 206 g CHO, and 9 g fiber daily.     10/5 - 10/8:  Novasource Renal (or equivalent) @ 35 ml/hr (840 ml) + 3 pkts prosource TF 20 provides 1920 kcal (27 kcal/kg), 136 g pro (1.9 g/kg), 154 g CHO, 602 ml free water, and 0 g fiber daily.      Access: prior NDT dislodged 10/9 with ADDIS attempt (procedure aborted), NGT to LIS d/t emesis    Intake: 7 day daily average: 229 ml pivot 1.5, 329 ml novasource renal, 2.3 pkts prosource TF20 provided 1185 kcals (16kcal/kg or 66% kcal needs), 98 g protein (1.4g/kg or 91% low end protein needs)      NEW FINDINGS   SLP eval noted, pt with overt s/sx of aspiration, recommend NPO  Future surgery planned (LLE amputation, tentative 10/16), ADDIS needed as able, possibly intra op per discussion in rounds.    Weight: 79.1 kg - fluctuations with fluid status, using dosing weight of 72 kg (driest weight)    Labs:   K+ 3.7  BUN 65.1 (H)  Cr 4.23 (H)  Mg++ 1.9 (WNL)  Phos 6.7 (H)    Meds: reviewed and include nephronex.  Scheduled bowel regimen of miralax and senna held.    GI: high NG output - 2750 yesterday, output remained high overnight, decreasing this shift per RN. Stooling.    Skin: new right heel PI - WOCN following.. abd wound vac, penile wound.    Renal: CRRT stopped 10/5, transitioned to iHD    MALNUTRITION  % Intake: < 75% for > 7 days (moderate)  % Weight Loss: None noted, confounded by fluid  Subcutaneous Fat Loss: Facial region:  moderate, Upper arm:  moderate, Lower arm:  mild, and Thoracic/intercostal:  mild  Muscle Loss: Temporal:  moderate, Facial & jaw region:  mild, Upper arm (bicep, tricep):  moderate to severe, Lower arm  (forearm):  moderate, Dorsal hand:  mild, and Upper leg (quadricep, hamstring):  mild  Fluid Accumulation/Edema: +2-4 per flowsheets  Malnutrition Diagnosis: Severe malnutrition in the context of acute illness    Previous Goals   Total avg nutritional intake to meet a minimum of 25 kcal/kg and 1.5 g PRO/kg daily (per dosing wt 72 kg).   Evaluation: Not met    Previous Nutrition Diagnosis  Altered GI function related to GI bleed and concern for bowel ischemia as evidenced by strict NPO with NGT to LIS and reliant on TPN to meet 100% nutrition needs   Evaluation: No longer applicable,  nutrition diagnosis changed below    CURRENT NUTRITION DIAGNOSIS  Inadequate protein-energy intake related to EN held since 10/8 d/t emesis as evidenced by NPO with NGT to LIS, currently meeting 0% kcal / protein needs      INTERVENTIONS  Implementation  Collaboration with other providers - possible TPN start 10/12.    Goals  Resume EN vs start PN by 10/12    Monitoring/Evaluation  Progress toward goals will be monitored and evaluated per protocol.    Natty Zeng, RD, LD, CNSC  4E SICU RD pager: [phone]  Ascom: [phone]  Weekend/Holiday RD pager [phone]

## 2023-10-11 NOTE — PLAN OF CARE
Major Shift Events:   Neuro: AO to self. Follows commands.   CV: ST, 110s-120s. Normotensive. T max 99.6. +4 scrotal edema. Generalized edema otherwise.  Resp: 3L NC. Coarse lung sounds  GI/: Anuric. No BM. No tf d/t high NG output. NPO.  Plan: Continue per plan of care. HD tomorrow  For vital signs and complete assessments, please see documentation flowsheets.

## 2023-10-11 NOTE — PLAN OF CARE
Goal Outcome Evaluation:      Plan of Care Reviewed With: family, other (see comments)SICU, RN    Overall Patient Progress: no changeOverall Patient Progress: no change    Outcome Evaluation: Please see 10/11 RD note for updated assessment

## 2023-10-12 ENCOUNTER — APPOINTMENT (OUTPATIENT)
Dept: GENERAL RADIOLOGY | Facility: CLINIC | Age: 68
DRG: 268 | End: 2023-10-12
Payer: COMMERCIAL

## 2023-10-12 ENCOUNTER — APPOINTMENT (OUTPATIENT)
Dept: SPEECH THERAPY | Facility: CLINIC | Age: 68
DRG: 268 | End: 2023-10-12
Payer: COMMERCIAL

## 2023-10-12 ENCOUNTER — APPOINTMENT (OUTPATIENT)
Dept: PHYSICAL THERAPY | Facility: CLINIC | Age: 68
DRG: 268 | End: 2023-10-12
Payer: COMMERCIAL

## 2023-10-12 ENCOUNTER — APPOINTMENT (OUTPATIENT)
Dept: ULTRASOUND IMAGING | Facility: CLINIC | Age: 68
DRG: 268 | End: 2023-10-12
Payer: COMMERCIAL

## 2023-10-12 LAB
ABO/RH(D): NORMAL
ALBUMIN SERPL BCG-MCNC: 2.7 G/DL (ref 3.5–5.2)
ALP SERPL-CCNC: 107 U/L (ref 40–129)
ALT SERPL W P-5'-P-CCNC: 28 U/L (ref 0–70)
ANION GAP SERPL CALCULATED.3IONS-SCNC: 20 MMOL/L (ref 7–15)
ANTIBODY SCREEN: NEGATIVE
AST SERPL W P-5'-P-CCNC: 48 U/L (ref 0–45)
BASE EXCESS BLDV CALC-SCNC: 19.3 MMOL/L (ref -7.7–1.9)
BILIRUB DIRECT SERPL-MCNC: 0.32 MG/DL (ref 0–0.3)
BILIRUB SERPL-MCNC: 0.5 MG/DL
BLD PROD TYP BPU: NORMAL
BLOOD COMPONENT TYPE: NORMAL
BUN SERPL-MCNC: 90.4 MG/DL (ref 8–23)
CA-I BLD-MCNC: 3.5 MG/DL (ref 4.4–5.2)
CALCIUM SERPL-MCNC: 8.5 MG/DL (ref 8.8–10.2)
CHLORIDE SERPL-SCNC: 87 MMOL/L (ref 98–107)
CODING SYSTEM: NORMAL
CREAT SERPL-MCNC: 6.21 MG/DL (ref 0.67–1.17)
CROSSMATCH: NORMAL
DEPRECATED HCO3 PLAS-SCNC: 36 MMOL/L (ref 22–29)
EGFRCR SERPLBLD CKD-EPI 2021: 9 ML/MIN/1.73M2
ERYTHROCYTE [DISTWIDTH] IN BLOOD BY AUTOMATED COUNT: 18.5 % (ref 10–15)
GLUCOSE BLDC GLUCOMTR-MCNC: 106 MG/DL (ref 70–99)
GLUCOSE BLDC GLUCOMTR-MCNC: 109 MG/DL (ref 70–99)
GLUCOSE BLDC GLUCOMTR-MCNC: 114 MG/DL (ref 70–99)
GLUCOSE BLDC GLUCOMTR-MCNC: 116 MG/DL (ref 70–99)
GLUCOSE BLDC GLUCOMTR-MCNC: 123 MG/DL (ref 70–99)
GLUCOSE BLDC GLUCOMTR-MCNC: 130 MG/DL (ref 70–99)
GLUCOSE BLDC GLUCOMTR-MCNC: 139 MG/DL (ref 70–99)
GLUCOSE SERPL-MCNC: 113 MG/DL (ref 70–99)
HCO3 BLDV-SCNC: 44 MMOL/L (ref 21–28)
HCT VFR BLD AUTO: 20.6 % (ref 40–53)
HGB BLD-MCNC: 6.8 G/DL (ref 13.3–17.7)
ISSUE DATE AND TIME: NORMAL
LACTATE SERPL-SCNC: 1 MMOL/L (ref 0.7–2)
MAGNESIUM SERPL-MCNC: 2.2 MG/DL (ref 1.7–2.3)
MCH RBC QN AUTO: 31.3 PG (ref 26.5–33)
MCHC RBC AUTO-ENTMCNC: 33 G/DL (ref 31.5–36.5)
MCV RBC AUTO: 95 FL (ref 78–100)
O2/TOTAL GAS SETTING VFR VENT: 30 %
PCO2 BLDV: 50 MM HG (ref 40–50)
PH BLDV: 7.55 [PH] (ref 7.32–7.43)
PHOSPHATE SERPL-MCNC: 10.3 MG/DL (ref 2.5–4.5)
PLATELET # BLD AUTO: 228 10E3/UL (ref 150–450)
PO2 BLDV: 37 MM HG (ref 25–47)
POTASSIUM SERPL-SCNC: 4 MMOL/L (ref 3.4–5.3)
PROT SERPL-MCNC: 5.7 G/DL (ref 6.4–8.3)
RBC # BLD AUTO: 2.17 10E6/UL (ref 4.4–5.9)
SODIUM SERPL-SCNC: 143 MMOL/L (ref 135–145)
SPECIMEN EXPIRATION DATE: NORMAL
TRIGL SERPL-MCNC: 125 MG/DL
UFH PPP CHRO-ACNC: 0.41 IU/ML
UNIT ABO/RH: NORMAL
UNIT NUMBER: NORMAL
UNIT STATUS: NORMAL
UNIT TYPE ISBT: 5100
WBC # BLD AUTO: 12.4 10E3/UL (ref 4–11)

## 2023-10-12 PROCEDURE — 250N000013 HC RX MED GY IP 250 OP 250 PS 637

## 2023-10-12 PROCEDURE — 250N000011 HC RX IP 250 OP 636: Mod: JZ

## 2023-10-12 PROCEDURE — 86900 BLOOD TYPING SEROLOGIC ABO: CPT | Performed by: SURGERY

## 2023-10-12 PROCEDURE — 85027 COMPLETE CBC AUTOMATED: CPT

## 2023-10-12 PROCEDURE — 82330 ASSAY OF CALCIUM: CPT | Performed by: PHYSICIAN ASSISTANT

## 2023-10-12 PROCEDURE — 200N000002 HC R&B ICU UMMC

## 2023-10-12 PROCEDURE — 84100 ASSAY OF PHOSPHORUS: CPT | Performed by: PHYSICIAN ASSISTANT

## 2023-10-12 PROCEDURE — 86923 COMPATIBILITY TEST ELECTRIC: CPT

## 2023-10-12 PROCEDURE — 99207 US TESTICULAR AND SCROTUM WITH DOPPLER LIMITED: CPT | Mod: 26 | Performed by: RADIOLOGY

## 2023-10-12 PROCEDURE — 86923 COMPATIBILITY TEST ELECTRIC: CPT | Performed by: PHARMACIST

## 2023-10-12 PROCEDURE — 250N000009 HC RX 250

## 2023-10-12 PROCEDURE — 97110 THERAPEUTIC EXERCISES: CPT | Mod: GP | Performed by: PHYSICAL THERAPIST

## 2023-10-12 PROCEDURE — 86923 COMPATIBILITY TEST ELECTRIC: CPT | Performed by: STUDENT IN AN ORGANIZED HEALTH CARE EDUCATION/TRAINING PROGRAM

## 2023-10-12 PROCEDURE — 71045 X-RAY EXAM CHEST 1 VIEW: CPT | Mod: 26 | Performed by: RADIOLOGY

## 2023-10-12 PROCEDURE — 85520 HEPARIN ASSAY: CPT | Performed by: SURGERY

## 2023-10-12 PROCEDURE — 82803 BLOOD GASES ANY COMBINATION: CPT | Performed by: CLINICAL NURSE SPECIALIST

## 2023-10-12 PROCEDURE — 250N000009 HC RX 250: Performed by: SURGERY

## 2023-10-12 PROCEDURE — 83605 ASSAY OF LACTIC ACID: CPT | Performed by: PHYSICIAN ASSISTANT

## 2023-10-12 PROCEDURE — 92526 ORAL FUNCTION THERAPY: CPT | Mod: GN

## 2023-10-12 PROCEDURE — 80053 COMPREHEN METABOLIC PANEL: CPT

## 2023-10-12 PROCEDURE — 250N000013 HC RX MED GY IP 250 OP 250 PS 637: Performed by: PHARMACIST

## 2023-10-12 PROCEDURE — 250N000013 HC RX MED GY IP 250 OP 250 PS 637: Performed by: STUDENT IN AN ORGANIZED HEALTH CARE EDUCATION/TRAINING PROGRAM

## 2023-10-12 PROCEDURE — 93976 VASCULAR STUDY: CPT

## 2023-10-12 PROCEDURE — B4185 PARENTERAL SOL 10 GM LIPIDS: HCPCS | Mod: JZ | Performed by: SURGERY

## 2023-10-12 PROCEDURE — 76870 US EXAM SCROTUM: CPT | Mod: 26 | Performed by: RADIOLOGY

## 2023-10-12 PROCEDURE — 71045 X-RAY EXAM CHEST 1 VIEW: CPT

## 2023-10-12 PROCEDURE — P9016 RBC LEUKOCYTES REDUCED: HCPCS | Performed by: PHARMACIST

## 2023-10-12 PROCEDURE — 258N000003 HC RX IP 258 OP 636: Performed by: CLINICAL NURSE SPECIALIST

## 2023-10-12 PROCEDURE — 84478 ASSAY OF TRIGLYCERIDES: CPT

## 2023-10-12 PROCEDURE — 250N000013 HC RX MED GY IP 250 OP 250 PS 637: Performed by: SURGERY

## 2023-10-12 PROCEDURE — 83735 ASSAY OF MAGNESIUM: CPT | Performed by: PHYSICIAN ASSISTANT

## 2023-10-12 RX ORDER — DEXTROSE MONOHYDRATE 100 MG/ML
INJECTION, SOLUTION INTRAVENOUS CONTINUOUS PRN
Status: DISCONTINUED | OUTPATIENT
Start: 2023-10-12 | End: 2023-10-17

## 2023-10-12 RX ORDER — CALCIUM GLUCONATE 20 MG/ML
1 INJECTION, SOLUTION INTRAVENOUS ONCE
Status: COMPLETED | OUTPATIENT
Start: 2023-10-12 | End: 2023-10-12

## 2023-10-12 RX ADMIN — ACETAMINOPHEN 975 MG: 325 TABLET, FILM COATED ORAL at 20:03

## 2023-10-12 RX ADMIN — ACETAMINOPHEN 975 MG: 325 TABLET, FILM COATED ORAL at 13:32

## 2023-10-12 RX ADMIN — NYSTATIN 500000 UNITS: 100000 SUSPENSION ORAL at 08:42

## 2023-10-12 RX ADMIN — METHOCARBAMOL 500 MG: 500 TABLET, FILM COATED ORAL at 22:42

## 2023-10-12 RX ADMIN — OLIVE OIL AND SOYBEAN OIL 250 ML: 16; 4 INJECTION, EMULSION INTRAVENOUS at 20:04

## 2023-10-12 RX ADMIN — GABAPENTIN 300 MG: 300 CAPSULE ORAL at 22:42

## 2023-10-12 RX ADMIN — NYSTATIN 500000 UNITS: 100000 SUSPENSION ORAL at 11:30

## 2023-10-12 RX ADMIN — HYDROXYZINE HYDROCHLORIDE 50 MG: 25 TABLET, FILM COATED ORAL at 13:32

## 2023-10-12 RX ADMIN — HEPARIN SODIUM 1900 UNITS/HR: 10000 INJECTION, SOLUTION INTRAVENOUS at 00:57

## 2023-10-12 RX ADMIN — LIDOCAINE HYDROCHLORIDE: 20 JELLY TOPICAL at 13:33

## 2023-10-12 RX ADMIN — CALCIUM GLUCONATE 1 G: 20 INJECTION, SOLUTION INTRAVENOUS at 15:13

## 2023-10-12 RX ADMIN — MAGNESIUM SULFATE HEPTAHYDRATE: 500 INJECTION, SOLUTION INTRAMUSCULAR; INTRAVENOUS at 20:04

## 2023-10-12 RX ADMIN — SODIUM CHLORIDE 500 ML: 9 INJECTION, SOLUTION INTRAVENOUS at 17:02

## 2023-10-12 RX ADMIN — Medication 5 MG: at 20:03

## 2023-10-12 RX ADMIN — ACETAMINOPHEN 975 MG: 325 TABLET, FILM COATED ORAL at 08:42

## 2023-10-12 RX ADMIN — ACETAMINOPHEN 975 MG: 325 TABLET, FILM COATED ORAL at 02:10

## 2023-10-12 RX ADMIN — NYSTATIN 500000 UNITS: 100000 SUSPENSION ORAL at 15:13

## 2023-10-12 RX ADMIN — NYSTATIN 500000 UNITS: 100000 SUSPENSION ORAL at 20:03

## 2023-10-12 RX ADMIN — ATORVASTATIN CALCIUM 10 MG: 10 TABLET, FILM COATED ORAL at 20:03

## 2023-10-12 RX ADMIN — SENNOSIDES AND DOCUSATE SODIUM 1 TABLET: 50; 8.6 TABLET ORAL at 20:03

## 2023-10-12 RX ADMIN — Medication: at 20:04

## 2023-10-12 RX ADMIN — HEPARIN SODIUM 1900 UNITS/HR: 10000 INJECTION, SOLUTION INTRAVENOUS at 12:44

## 2023-10-12 RX ADMIN — Medication 25 MG: at 22:43

## 2023-10-12 ASSESSMENT — ACTIVITIES OF DAILY LIVING (ADL)
ADLS_ACUITY_SCORE: 45
ADLS_ACUITY_SCORE: 47
ADLS_ACUITY_SCORE: 45
ADLS_ACUITY_SCORE: 47
ADLS_ACUITY_SCORE: 47
ADLS_ACUITY_SCORE: 45
ADLS_ACUITY_SCORE: 47
ADLS_ACUITY_SCORE: 45
ADLS_ACUITY_SCORE: 47

## 2023-10-12 NOTE — PLAN OF CARE
Major Shift Events:  Lethargic, oriented only to self. Follows commands, moves all extremities (except LLE) spontaneously. L pupil > R pupil, reactive. Speech spontaneous but only whispers. C/o itchiness, diffuse rash on extremities and abdomen/back; use benadryl cream PRN. Mitts continue. ST, afebrile, SBP <160 without intervention. Transient episode of sustained desaturation in the 80s while increasing O2 to 10 L oxymask. Pt also appeared to be Cheyne-Mary breathing. CXR & VBG obtained; 1 g calcium gluconate given. NPO, NG to LIS with 750 total out this shift. Aneuric, x1 smear BM. Heparin gtt 1900 unit(s)/hr.    Plan: Transfer out of ICU to C when bed available. AKA planned for 10/16.    For vital signs and complete assessments, please see documentation flowsheets.       Goal Outcome Evaluation:      Plan of Care Reviewed With: patient, spouse    Overall Patient Progress: no changeOverall Patient Progress: no change

## 2023-10-12 NOTE — CARE PLAN
"   Major Shift Events: RAAS 0 to -1. Oriented x1-2, to self and place- only able to state \"hospital\", not city or state. Follows simple commands. Moving all extremities purposefully, weak in LLE. Whispers/hoarse voice. Denied pain, received schedule tylenol, no PRNs given. Slept between cares.  Afebrile. Sinus tachycardia, 100-110s, frequent PVCs. On 3L nasal cannula. No void this shift. NG to LIS with high output. Loose/watery BM x2. Hep gtt therapeutic @ 1900.      Hbg of 6.8. SICU team notified - 1 unit RBCs ordered and transfused.     Plan: Possible OR Monday for L AKA. HD today. Continue to follow POC and notify team of any changes.      For vital signs and complete assessments, please see documentation flowsheets.         "

## 2023-10-12 NOTE — PROGRESS NOTES
[facility]  WO Nurse Inpatient Assessment     Consulted for: right heel PI    Patient History (according to provider note(s):      Alfredo Burnham is a 68 year old male who was admitted to the SICU on 9/24/2023 s/p open AAA repair. Patient has a history of HTN and previous TIA. He presented to the ED on 9/24 with right sided abdominal pain and was found to have a contained, ruptured AAA on CT. 30 min super-celiac clamp time. Prolonged intra-renal clamp. 9/25 s/p flex sig showing rectal ischemia, abdominal washout showing a hemostatic AAA graft and no evidence of transmural colonic or small bowel ischemia, and an unsuccessful LLE embolectomy. 9/26 s/p repeat abdominal washout, retroperitoneal closure, LLE wound washout. 9/29 s/p repeat abd washout, bowel appeared well perfused w/o notable ischemia. OR 10/2 for abdominal fascia closure. Extubated 10/4. Unequal pupils and R facial droop, MRI brain showed multiple small infarcts, likely embolic.      Assessment:      Areas visualized during today's visit: Focused: right heel    Pressure Injury Location: right heel     Last photo: 10/9  Wound type: Pressure Injury     Pressure Injury Stage: Deep Tissue Pressure Injury (DTPI), hospital acquired   Wound history/plan of care:   found by bedside RN 10/7  Wound base: 100 % non-blanchable, maroon, and epidermis     Palpation of the wound bed: normal      Drainage: none     Description of drainage: none     Measurements (length x width x depth, in cm) 0.5  x 0.5  x  0 cm      Tunneling N/A     Undermining N/A  Periwound skin: Erythema- blanchable      Color: pink      Temperature: normal   Odor: none  Pain: no grimacing or signs of discomfort, none  Pain intervention prior to dressing change: N/A  Treatment goal: Heal  and Protection  STATUS: improving  Supplies ordered: supplies stored on unit    My PI Risk Assessment     Sensory Perception: 2 - Very Limited     Moisture: 3 -  Occasionally moist      Activity: 1 - Bedfast      Mobility: 1 - Completely immobile      Nutrition: 3 - Adequate     Friction/Shear: 1 - Problem     TOTAL: 11     Treatment Plan:     Right heel wound(s): Every 3 days and PRN cleanse with wound cleanser and pat dry. Paint filemon wound skin with no sting. Conform mepilex over wound. Ensure heels are elevated with pillows or heel lift boots at all times.   *May need to use sacral mepilex if 4x4 continues to lift/ peel up.     Orders: Reviewed    RECOMMEND PRIMARY TEAM ORDER: None, at this time  Education provided: plan of care and wound progress  Discussed plan of care with: Family and Nurse  [facility] nurse follow-up plan: twice weekly  Notify WOC if wound(s) deteriorate.  Nursing to notify the Provider(s) and re-consult the [facility] Nurse if new skin concern.    DATA:     Current support surface: Standard  Low air loss (FARSHAD pump, Isolibrium, Pulsate, skin guard, etc)  Containment of urine/stool: Incontinent pad in bed  BMI: Body mass index is 25.88 kg/m .   Active diet order: Orders Placed This Encounter      NPO per Anesthesia Guidelines for Procedure/Surgery Except for: Ice Chips     Output: I/O last 3 completed shifts:  In: 1178 [I.V.:528; NG/GT:350]  Out: 2400 [Emesis/NG output:2400]     Labs:   Recent Labs   Lab 10/12/23  0323 10/08/23  0510 10/08/23  0005   ALBUMIN 2.7*   < >  --    HGB 6.8*   < > 7.3*   INR  --   --  1.13   WBC 12.4*   < > 13.8*    < > = values in this interval not displayed.     Pressure injury risk assessment:   Sensory Perception: 2-->very limited  Moisture: 2-->very moist  Activity: 2-->chairfast  Mobility: 2-->very limited  Nutrition: 2-->probably inadequate  Friction and Shear: 2-->potential problem  Edson Score: 12    Aria Drummond RN CWOCN   Pager no longer is use, please contact through tuul group: [facility] Nurse [place]  Dept. Office Number: [phone]

## 2023-10-12 NOTE — PROGRESS NOTES
SURGICAL ICU PROGRESS NOTE  10/12/2023        Date of Service (when I saw the patient): 10/12/2023    ASSESSMENT:  Alfredo Burnham is a 68 year old male who was admitted to the SICU on 9/24/2023 s/p open AAA repair. Patient has a history of HTN and previous TIA. He presented to the ED on 9/24 with right sided abdominal pain and was found to have a contained, ruptured AAA on CT. 30 min super-celiac clamp time. Prolonged intra-renal clamp. 9/25 s/p flex sig showing rectal ischemia, abdominal washout showing a hemostatic AAA graft and no evidence of transmural colonic or small bowel ischemia, and an unsuccessful LLE embolectomy. 9/26 s/p repeat abdominal washout, retroperitoneal closure, LLE wound washout. 9/29 s/p repeat abd washout, bowel appeared well perfused w/o notable ischemia. OR 10/2 for abdominal fascia closure. Extubated 10/4. Unequal pupils and R facial droop, MRI brain showed multiple small infarcts, likely embolic.      CHANGES and MAJOR THINGS TODAY:   - start TPN for nutrition  - follow-up on scheduling L AKA, nerve block with RAPS, and ADDIS   - scrotal US, follow  - okay to transfer to floor from SICU perspective      PLAN:    Neurological:  # Acute pain  - Multi-modal pain control effective (scheduled tylenol, oxycodone PRN, dilaudid PRN, gabapentin TID, robaxin PRN)  - Nerve block delayed until amputation day, pending scheduling (likely 10/16)    #Facial droop, left lacunar infarct  #Punctate lesions of cerebellum   - high dose heparin   - ADDIS ideally intra-op  - Follow-up with stroke neurology 4-6 weeks after discharge  - neruocrit ok with anticoagulation (now signed off)     # Delirium  # Mixed metabolic encephalopathy   # Sedation, RASS goal 0 to -1  - Monitor neurological status. Delirium preventions and precautions  - Melatonin q6pm and seroquel for sleep aid  - Atarax PRN for anxiety     Pulmonary:  # Acute hypoxic respiratory failure  # s/p extubation 10/3  Satting 94-98% overnight.   -  Nasal cannula @ 3 LPM; continue to wean down as tolerated  - Head of bed elevation      Cardiovascular:    # Contained AAA rupture s/p open repair  # Suspected emboli to LLE, LLE ischemia  # Hx of HTN  # Tachycardia  Hgb at 6.8 this morning, transfused 1u pRBCs.   - Continue to monitor hemodynamic status; Goal MAP >65, SBP <160  - L AKA delayed, not yet scheduled  - high dose heparin   - Continue Lipitor 10 mg, metoprolol 12.5 BID  - ADDIS ideally intra-op    GI/Nutrition:    # s/p open AAA repair, wound vac device in place  # Post-operative melena, resolved  # Rectal ischemia, concern for, resolved  High NG output, 2.7L on LIS yesterday, 750 mL overnight.  - tube feeds held considering high NG output  - Continue bowel regimen  - Ok for ice chips by mouth per speech  - Start TPN today     Renal/ / Fluids/Electrolytes:  # MAYA  # Oliguria  Cr at 6.2 this morning, likely dialyzing today  - Dialysis management per nephrology, iHD  - Daily straight cath  - Scrotal US     Endocrine:  # Stress hyperglycemia  BG in the 113-127 over last 24h, no units of aspart given  - Q6 BG checks, continue high dose sliding scale insulin.   - no changes to regimen needed      Infectious disease:   # Fever, improving    # Likely aspiration event during ADDIS  White count falling to 12 (from 14); afebrile overnight. Tachycardia continues (110's).  S/p cefepime, vancomycin, metronidazole, and voriconazole for positive BAL (aspergillus and candida) and fever of unknown origin  - Sputum culture  - Nystatin for thrush     Hematology:    # Acute blood loss anemia  # Thrombocytopenia (resolved)  # LLE ischemia/ DVT  # PE  - Transfused unit of pRBC for hgb=6.8  - High dose heparin   - Hydrocortisone taper completed 10/5  - Considered CT CAP per neuro crit recommendation to assess for malignancy, given recent CT CAP 9/24 which showed no malignancy, likely minimal utility. Infarcts resemble microemboli of atherosclerotic origin which may be secondary  to AAA repair.      Musculoskeletal:  # Weakness and deconditioning of critical illness  # Left lower limb ischemia   - Passive ROM at bedside with RN  Marisol WINSLOW likely Monday 10/16; planning for block prior to surgery with RAPS  - Physical therapy: up and out of bed  - Podiatry consult for nail clipping      Skin:  # Pressure ulcer R heel   - WOC seeing and managing      General Cares/Prophylaxis:    DVT Prophylaxis: Pneumatic Compression Devices and heparin drip  GI Prophylaxis: Not indicated  Restraints: Restraints for medical healing needed: YES     Lines/ tubes/ drains:  - PIV x2  - RIJ Dialysis line  - L triple lumen catheter  - L radial arterial line  - VAC on abdomen and L leg    Disposition:  - okay to de-escalate to floor management per primary team    Patient seen, findings and plan discussed with surgical ICU staff, Dr. SAAVEDRA .    Jenni Mora, MS4  Surgical ICU    ====================================  INTERVAL HISTORY:   Got unit of RBCs after Hgb of 6.8. Got up to chair yesterday evening. Oriented to self this morning. Having scrotal pain.     ROS unable to be performed due to unintelligible whispering      OBJECTIVE:   1. VITAL SIGNS:   Temp:  [98.2  F (36.8  C)-99.6  F (37.6  C)] 98.7  F (37.1  C)  Pulse:  [103-117] 103  Resp:  [12-26] 13  MAP:  [66 mmHg-104 mmHg] 87 mmHg  Arterial Line BP: ()/(50-88) 135/64  SpO2:  [92 %-99 %] 93 %  Resp: 13      2. INTAKE/ OUTPUT:   I/O last 3 completed shifts:  In: 878 [I.V.:528; NG/GT:350]  Out: 2800 [Emesis/NG output:2750; Drains:50]    3. PHYSICAL EXAMINATION:  General: Nasal cannula, laying in bed. Can follow simple commands. Speech difficult to understand.  HEENT: Normocephalic, atraumatic. RIJ dialysis line  Neuro: Moving all extremities spontaneously (aside from necrotic left lower leg), can raise eyebrows and weakly smile   Pulm/Resp: Clear lung fields  CV: Mild tachycardic with occasional PVCs  Abdomen: Fascia closed with VAC in place,  serosanguinous output, soft, mildly distended, soft  : deferred   MSK/Extremities: RLE warm to palpation with multiphasic DP, edematous. LLE cool to touch, dusky, no signals distal of popliteal. VAC on LLE fasciotomy sites    4. INVESTIGATIONS:   Arterial Blood Gases   No lab results found in last 7 days.  Complete Blood Count   Recent Labs   Lab 10/12/23  0323 10/11/23  0312 10/10/23  0417 10/09/23  0301   WBC 12.4* 14.0* 17.3* 14.4*   HGB 6.8* 7.4* 7.0* 7.6*    192 182 169     Basic Metabolic Panel  Recent Labs   Lab 10/12/23  0327 10/12/23  0323 10/12/23  0023 10/11/23  2000 10/11/23  0341 10/11/23  0312 10/10/23  0756 10/10/23  0417 10/09/23  0314 10/09/23  0301   NA  --  143  --   --   --  139  --  137  --  135   POTASSIUM  --  4.0  --   --   --  3.7  --  4.1  --  4.0   CHLORIDE  --  87*  --   --   --  92*  --  93*  --  97*   CO2  --  36*  --   --   --  31*  --  27  --  23   BUN  --  90.4*  --   --   --  65.1*  --  94.4*  --  69.2*   CR  --  6.21*  --   --   --  4.23*  --  5.28*  --  3.56*   * 113* 109* 109*   < > 127*   < > 129*   < > 115*    < > = values in this interval not displayed.     Liver Function Tests  Recent Labs   Lab 10/12/23  0323 10/11/23  0312 10/10/23  0417 10/09/23  0301 10/08/23  0510 10/08/23  0005 10/07/23  0428 10/06/23  2155   AST 48* 44 42 55*   < >  --    < >  --    ALT 28 28 29 33   < >  --    < >  --    ALKPHOS 107 124 142* 182*   < >  --    < >  --    BILITOTAL 0.5 0.5 0.5 0.5   < >  --    < >  --    ALBUMIN 2.7* 2.6* 2.6* 2.5*   < >  --    < >  --    INR  --   --   --   --   --  1.13  --  1.19*    < > = values in this interval not displayed.     Pancreatic Enzymes  No lab results found in last 7 days.  Coagulation Profile  Recent Labs   Lab 10/08/23  0005 10/06/23  2155   INR 1.13 1.19*   PTT 81* 31         5. RADIOLOGY:   No results found for this or any previous visit (from the past 24 hour(s)).    =========================================    I saw and evaluated  the patient in an independent visit and agree with the student's assessment and plan as written above. I was present at all times for any mentioned procedures.     Clint Braga MD  PGY-1, Neurosurgery  Off-service on SICU

## 2023-10-12 NOTE — PROGRESS NOTES
I visited again with Mr. Mcgraw today he was able to visit with family yesterday.  Overall he has had no major changes.  His respiratory status although tenuous, remains stable.  We have delayed above-knee amputation in favor of optimizing his respiratory function and making sure he does not develop aspiration pneumonia.  We will plan to proceed with amputation next Tuesday.  We will continue to change his midline wound VAC at bedside with potential for delayed primary closure in the future.  Overall his mental status is stable to slightly improved.  He does follow conversations and answer questions appropriately.  He continues on dialysis and is tolerating this better from a hemodynamic perspective.  He does have some continued bowel function, however with high output NG tube we will plan to start TPN. we will continue to prioritize antidelirium precautions, and working with physical therapy.

## 2023-10-12 NOTE — PROGRESS NOTES
Vascular Surgery Progress Note  10/12/2023       Subjective:  VAC changed 10/11/23, NGT output was 1750ml. Hgb dropped to 6.8, received 1 unit PRBCs. Remains afebrile, on 3LNC.     VAC output was 50 mL in the last 24 hours.    Objective:  Temp:  [98.2  F (36.8  C)-99.6  F (37.6  C)] 98.7  F (37.1  C)  Pulse:  [103-117] 109  Resp:  [12-26] 21  MAP:  [66 mmHg-104 mmHg] 96 mmHg  Arterial Line BP: ()/(50-88) 143/70  SpO2:  [92 %-99 %] 95 %    I/O last 3 completed shifts:  In: 1178 [I.V.:528; NG/GT:350]  Out: 2400 [Emesis/NG output:2400]      Gen: Wakes up to converse, resting comfortably in bed in ICU.  CV: RR per DP pulse, off pressors, sinus tach per tele with PACs  Resp: On NC, non-labored  Abd: Wound vac in place, holding seal, abdomen much softer to palpation around VAC   Ext: RLE wwp. LLE cool to touch, dusky. Blistering appreciated from knee to foot. VAC changes M/W/F  Abdominal incision without purulence drainage, fascia remains closed, incision without signs or symptoms of infection.  Slight skin dehiscence noted on right superior aspect of incision, improving, we will continue to monitor.                      Labs:  Recent Labs   Lab 10/12/23  0323 10/11/23  0312 10/10/23  0417   WBC 12.4* 14.0* 17.3*   HGB 6.8* 7.4* 7.0*    192 182       Recent Labs   Lab 10/12/23  0327 10/12/23  0323 10/12/23  0023 10/11/23  0341 10/11/23  0312 10/10/23  0756 10/10/23  0417   NA  --  143  --   --  139  --  137   POTASSIUM  --  4.0  --   --  3.7  --  4.1   CHLORIDE  --  87*  --   --  92*  --  93*   CO2  --  36*  --   --  31*  --  27   BUN  --  90.4*  --   --  65.1*  --  94.4*   CR  --  6.21*  --   --  4.23*  --  5.28*   * 113* 109*   < > 127*   < > 129*   OMAR  --  8.5*  --   --  8.8  --  8.6*   MAG  --  2.2  --   --  1.9  --  2.0   PHOS  --  10.3*  --   --  6.7*  --  7.4*    < > = values in this interval not displayed.      Assessment/Plan:   68 year old male with PMH of HTN and previous TIA who presented  with R sided abdominal pain found to have contained ruptured AAA, now s/p open AAA repair 9/24 into 9/25am with 30 min supraceliac clamp time, prolonged inter-renal clamp time, temporary abdominal closure. He did have bloody stool postop with flex sig 9/25 demonstrating ischemic mucosal changes of the rectum, however on repeated abdominal looks he has had no evidence of transmural necrosis of the small or large intestine, or the rectum. On 9/29 he was started on CRRT given ongoing low UOP and rising Cr and failure of lasix challenge. Now on iHD. Otherwise hemodynamically continues to do well off pressors. Ischemic LLE unchanged, note DVT in this leg, this is to be expected given ischemia and absent inflow. We will continue to monitor LLE given it is not currently making patient systemically ill and there is no indication for urgent intervention. S/p closure of abdominal fascia and subcutaneous tissue left open with wound VAC applied 10/2/23. Left AKA on hold at this time, as leukocytosis improves and supplemental oxygen requirement downtrend. Continues to have large NGT output, high risk for aspiration and further worsening infectious process.    - Appreciate excellent ICU care.  - Out of bed to chair, PT/OT, passive ROM  - L AKA on hold for now, potentially next week on Monday, planning for block prior to surgery with RAPS  - Stroke workup negative -- MRI with multiple small infarcts  - PE+, venous duplex with nonocclusive to occlusive thrombus of the left GSV from the level of the knee to the groin and nonocclusive thrombus of the left distal femoral vein and popliteal veins, increased in extent compared to 9/30/2023. Continue with heparin gtt.  - Goal SBP <160, MAP >65  - Continue with NGT, output 2.7L in the last 24hrs. NGT to LWS   - NPO  - Okay to start TPN  - Appreciate nephrology recommendations, HD runs  - VAC changes with vascular surgery M/W/F      ADDENDUM: okay to transfer to floor, no pressors required  with HD , last pressor required with HD was on 10/8/23.    Discussed and seen patient with , vascular surgery attending     Miryam Carrasco, CNP  Vascular Surgery  Pager: [phone]  [email or web address]  Send message or 10 digit call back number Securely via [employer] with the [employer] Web Console (learn more here)

## 2023-10-12 NOTE — PROGRESS NOTES
Nephrology Progress Note  10/12/2023       Alfredo Burnham is a 68 yom with complex hx of TIA, HTN, blindness who presented to [facility] 9/24 with severe abdominal pain radiating to flank and back, CT revealed AAA with a contained rupture.  Taken to OR for aortobiiliac bypass and resection of ruptured pararenal aneurysm.   Post op course complicated by hypotension requiring pressors and metabolic acidosis.  Baseline Cr 1.0 on presentation but on the rise to >5 at time of renal consult for MAYA management and possible RRT.       Interval History :   Mr Burnham had CRRT stopped 10/4, stable on iHD since.  Was planned for run today but no pressing issue in labs to correct and overall is net negative on his own with large GI/NG output.  Bicarb high likely with upper GI losses, Phos noted to be high but a binder currently would be ineffective as we are switching from TF to TPN, would avoid Phos in TPN as he is not clearing it.  Likely run tomorrow but holding today to promote renal recovery.     Noted pH of 7.55 due to metabolic alkalosis with GI losses, ordered 500cc NS to help today, plan to run on low bicarb bath tomorrow am.      Assessment & Recommendations:   MAYA-Baseline Cr 1.0, ordered UA.  CT showed benign cyst but otherwise normal kidneys.  Cr on the rise since surgery, did receive contrast for CTA.  Urine microscopy showed granular casts suggesting ATN which will recover with time and stabilizing hemodynamics.  Started on CRRT 9/30 for volume and clearance with minimal UOP and rising Cr.                  -Started CRRT 9/30 for volume and clearance, stopped 10/4 and now running iHD PRN and watching for recovery.        -Line is [facility] temp line from 10/6                 -Dialysis consent signed and scanned into media tab.      Volume-Wt on downtrend, still some edema on exam but resp status stable.  Holding on run today.      Electrolytes-K 4.0, bicarb 36 (likely due to GI losses with large GI output), Na 143.  I  "ordered VBG.      BMD-Ca 8.5, Mg 2.2.  Phos noted to be high at 10.3, would use binder but we are stopping TF and starting TPN so binder would not be effective.       Anemia-Hgb 6.8, ~14 on presentation, acute management per team.       Nutrition-Starting TPN,      Time spent: 40 minutes on this date of encounter for chart review, physical exam, medical decision making and co-ordination of care.      Seen and discussed with Dr Carranza     Recommendations were communicated to primary team via verbal communication.        ALTAGRACIA Jiménez CNS  Clinical Nurse Specialist  [phone]    Review of Systems:   I reviewed the following systems:  ROS not done due to lethargy    Physical Exam:   I/O last 3 completed shifts:  In: 1178 [I.V.:528; NG/GT:350]  Out: 2400 [Emesis/NG output:2400]   BP (!) 86/60 (BP Location: Left arm)   Pulse 111   Temp 98.4  F (36.9  C) (Oral)   Resp 13   Ht 1.727 m (5' 8\")   Wt 77.2 kg (170 lb 3.1 oz)   SpO2 97%   BMI 25.88 kg/m       GENERAL APPEARANCE: Extubated, lethargic, in no distress.   EYES: No scleral icterus  Pulmonary: On vent 40%/8 of PEEP  CV: Regular rhythm, normal rate   - Edema +1-2 generalized, + scrotal edema.    GI: distended, nontender  MS: no evidence of inflammation in joints, no muscle tenderness  : No Lu  SKIN: no rash, warm, dry  NEURO: No focal deficits.         Labs:   All labs reviewed by me  Electrolytes/Renal -   Recent Labs   Lab Test 10/12/23  1124 10/12/23  0845 10/12/23  0327 10/12/23  0323 10/11/23  0341 10/11/23  0312 10/10/23  0756 10/10/23  0417   NA  --   --   --  143  --  139  --  137   POTASSIUM  --   --   --  4.0  --  3.7  --  4.1   CHLORIDE  --   --   --  87*  --  92*  --  93*   CO2  --   --   --  36*  --  31*  --  27   BUN  --   --   --  90.4*  --  65.1*  --  94.4*   CR  --   --   --  6.21*  --  4.23*  --  5.28*   * 123* 106* 113*   < > 127*   < > 129*   OMAR  --   --   --  8.5*  --  8.8  --  8.6*   MAG  --   --   --  2.2  --  1.9  " --  2.0   PHOS  --   --   --  10.3*  --  6.7*  --  7.4*    < > = values in this interval not displayed.       CBC -   Recent Labs   Lab Test 10/12/23  0323 10/11/23  0312 10/10/23  0417   WBC 12.4* 14.0* 17.3*   HGB 6.8* 7.4* 7.0*    192 182       LFTs -   Recent Labs   Lab Test 10/12/23  0323 10/11/23  0312 10/10/23  0417   ALKPHOS 107 124 142*   BILITOTAL 0.5 0.5 0.5   ALT 28 28 29   AST 48* 44 42   PROTTOTAL 5.7* 5.8* 5.5*   ALBUMIN 2.7* 2.6* 2.6*       Iron Panel - No lab results found.        Current Medications:    acetaminophen  975 mg Oral or Feeding Tube Q6H     atorvastatin  10 mg Oral or Feeding Tube QPM     gabapentin  300 mg Oral or Feeding Tube At Bedtime     insulin aspart  1-12 Units Subcutaneous Q4H     lipids plant base  250 mL Intravenous Q24H     melatonin  5 mg Oral or Feeding Tube QPM     nystatin  500,000 Units Oral 4x Daily     polyethylene glycol  17 g Oral or Feeding Tube Daily     QUEtiapine  12.5-25 mg Oral or Feeding Tube At Bedtime     senna-docusate  1 tablet Oral or Feeding Tube BID     sodium chloride (PF)  3 mL Intravenous Q8H     sodium chloride (PF)  3 mL Intracatheter Q8H       dextrose       dextrose       dextrose       heparin 1,900 Units/hr (10/12/23 1244)     parenteral nutrition - ADULT compounded formula

## 2023-10-12 NOTE — PROGRESS NOTES
CLINICAL NUTRITION SERVICES - BRIEF NOTE   (See full RD reassessment note in chart 10/11)    Reason for RD note: Provider order to start and manage TPN    New Findings/Chart Review:  High NG output persists (2750 ml)  TG previously elevated @ 357 (9/27) - not a fasting level, drawn while propofol infusing.  Labs and meds reviewed    Interventions:  TPN change order entered:        - Add on TG level (fasting)    Future/Additional Recommendations:  - Monitor GI status / ability to feed enterally.  Prior ppFT dislodged with ADDIS attempt.  If replacement needed and pt is no longer in ICU, consult radiology, preferably after ADDIS is completed as feeding tubes become dislodged with this procedure.  - Monitor daily K+, Mg++ and Phos, as well as weekly TG, LFTs and bili with TPN    Nutrition will continue to follow per protocol.    Natty Zeng, RD, LD, CNSC  4E SICU RD pager: [phone]  Ascom: [phone]  Weekend/Holiday RD pager [phone]

## 2023-10-13 ENCOUNTER — APPOINTMENT (OUTPATIENT)
Dept: CT IMAGING | Facility: CLINIC | Age: 68
DRG: 268 | End: 2023-10-13
Payer: COMMERCIAL

## 2023-10-13 ENCOUNTER — APPOINTMENT (OUTPATIENT)
Dept: GENERAL RADIOLOGY | Facility: CLINIC | Age: 68
DRG: 268 | End: 2023-10-13
Payer: COMMERCIAL

## 2023-10-13 ENCOUNTER — APPOINTMENT (OUTPATIENT)
Dept: PHYSICAL THERAPY | Facility: CLINIC | Age: 68
DRG: 268 | End: 2023-10-13
Payer: COMMERCIAL

## 2023-10-13 ENCOUNTER — ANESTHESIA EVENT (OUTPATIENT)
Dept: INTENSIVE CARE | Facility: CLINIC | Age: 68
DRG: 268 | End: 2023-10-13
Payer: COMMERCIAL

## 2023-10-13 ENCOUNTER — ANESTHESIA (OUTPATIENT)
Dept: INTENSIVE CARE | Facility: CLINIC | Age: 68
DRG: 268 | End: 2023-10-13
Payer: COMMERCIAL

## 2023-10-13 LAB
ALBUMIN SERPL BCG-MCNC: 2.6 G/DL (ref 3.5–5.2)
ALBUMIN SERPL BCG-MCNC: 3.2 G/DL (ref 3.5–5.2)
ALP SERPL-CCNC: 115 U/L (ref 40–129)
ALP SERPL-CCNC: 91 U/L (ref 40–129)
ALT SERPL W P-5'-P-CCNC: 30 U/L (ref 0–70)
ALT SERPL W P-5'-P-CCNC: 47 U/L (ref 0–70)
ANION GAP SERPL CALCULATED.3IONS-SCNC: 14 MMOL/L (ref 7–15)
ANION GAP SERPL CALCULATED.3IONS-SCNC: 16 MMOL/L (ref 7–15)
ANION GAP SERPL CALCULATED.3IONS-SCNC: 20 MMOL/L (ref 7–15)
APTT PPP: 121 SECONDS (ref 22–38)
APTT PPP: 125 SECONDS (ref 22–38)
AST SERPL W P-5'-P-CCNC: 57 U/L (ref 0–45)
AST SERPL W P-5'-P-CCNC: 89 U/L (ref 0–45)
BILIRUB DIRECT SERPL-MCNC: 0.27 MG/DL (ref 0–0.3)
BILIRUB SERPL-MCNC: 0.4 MG/DL
BILIRUB SERPL-MCNC: 0.5 MG/DL
BLD PROD TYP BPU: NORMAL
BLOOD COMPONENT TYPE: NORMAL
BUN SERPL-MCNC: 111 MG/DL (ref 8–23)
BUN SERPL-MCNC: 39.9 MG/DL (ref 8–23)
BUN SERPL-MCNC: 42.1 MG/DL (ref 8–23)
CA-I BLD-MCNC: 3.8 MG/DL (ref 4.4–5.2)
CA-I BLD-MCNC: 4.1 MG/DL (ref 4.4–5.2)
CA-I BLD-MCNC: 4.5 MG/DL (ref 4.4–5.2)
CALCIUM SERPL-MCNC: 8.1 MG/DL (ref 8.8–10.2)
CALCIUM SERPL-MCNC: 8.3 MG/DL (ref 8.8–10.2)
CALCIUM SERPL-MCNC: 8.4 MG/DL (ref 8.8–10.2)
CHLORIDE SERPL-SCNC: 102 MMOL/L (ref 98–107)
CHLORIDE SERPL-SCNC: 102 MMOL/L (ref 98–107)
CHLORIDE SERPL-SCNC: 85 MMOL/L (ref 98–107)
CODING SYSTEM: NORMAL
CPB POCT: NO
CREAT SERPL-MCNC: 2.93 MG/DL (ref 0.67–1.17)
CREAT SERPL-MCNC: 3.24 MG/DL (ref 0.67–1.17)
CREAT SERPL-MCNC: 7.56 MG/DL (ref 0.67–1.17)
CROSSMATCH: NORMAL
DEPRECATED HCO3 PLAS-SCNC: 20 MMOL/L (ref 22–29)
DEPRECATED HCO3 PLAS-SCNC: 24 MMOL/L (ref 22–29)
DEPRECATED HCO3 PLAS-SCNC: 39 MMOL/L (ref 22–29)
EGFRCR SERPLBLD CKD-EPI 2021: 20 ML/MIN/1.73M2
EGFRCR SERPLBLD CKD-EPI 2021: 22 ML/MIN/1.73M2
EGFRCR SERPLBLD CKD-EPI 2021: 7 ML/MIN/1.73M2
ERYTHROCYTE [DISTWIDTH] IN BLOOD BY AUTOMATED COUNT: 17 % (ref 10–15)
ERYTHROCYTE [DISTWIDTH] IN BLOOD BY AUTOMATED COUNT: 17.2 % (ref 10–15)
ERYTHROCYTE [DISTWIDTH] IN BLOOD BY AUTOMATED COUNT: 17.9 % (ref 10–15)
FIBRINOGEN PPP-MCNC: 854 MG/DL (ref 170–490)
GLUCOSE BLD-MCNC: 171 MG/DL (ref 70–99)
GLUCOSE BLDC GLUCOMTR-MCNC: 140 MG/DL (ref 70–99)
GLUCOSE BLDC GLUCOMTR-MCNC: 142 MG/DL (ref 70–99)
GLUCOSE BLDC GLUCOMTR-MCNC: 150 MG/DL (ref 70–99)
GLUCOSE BLDC GLUCOMTR-MCNC: 153 MG/DL (ref 70–99)
GLUCOSE BLDC GLUCOMTR-MCNC: 156 MG/DL (ref 70–99)
GLUCOSE SERPL-MCNC: 142 MG/DL (ref 70–99)
GLUCOSE SERPL-MCNC: 145 MG/DL (ref 70–99)
GLUCOSE SERPL-MCNC: 178 MG/DL (ref 70–99)
HCO3 BLDV-SCNC: 24 MMOL/L (ref 21–28)
HCT VFR BLD AUTO: 20.8 % (ref 40–53)
HCT VFR BLD AUTO: 28.7 % (ref 40–53)
HCT VFR BLD AUTO: 29.2 % (ref 40–53)
HCT VFR BLD CALC: 32 % (ref 40–53)
HGB BLD-MCNC: 10.9 G/DL (ref 13.3–17.7)
HGB BLD-MCNC: 6.9 G/DL (ref 13.3–17.7)
HGB BLD-MCNC: 9.2 G/DL (ref 13.3–17.7)
HGB BLD-MCNC: 9.3 G/DL (ref 13.3–17.7)
HOLD SPECIMEN: NORMAL
INR PPP: 1.18 (ref 0.85–1.15)
INR PPP: 1.2 (ref 0.85–1.15)
INR PPP: 1.4 (ref 0.85–1.15)
ISSUE DATE AND TIME: NORMAL
LACTATE SERPL-SCNC: 1.4 MMOL/L (ref 0.7–2)
LACTATE SERPL-SCNC: 1.5 MMOL/L (ref 0.7–2)
LACTATE SERPL-SCNC: 1.7 MMOL/L (ref 0.7–2)
MAGNESIUM SERPL-MCNC: 1.7 MG/DL (ref 1.7–2.3)
MAGNESIUM SERPL-MCNC: 1.9 MG/DL (ref 1.7–2.3)
MAGNESIUM SERPL-MCNC: 2.3 MG/DL (ref 1.7–2.3)
MCH RBC QN AUTO: 30.8 PG (ref 26.5–33)
MCH RBC QN AUTO: 31.1 PG (ref 26.5–33)
MCH RBC QN AUTO: 31.5 PG (ref 26.5–33)
MCHC RBC AUTO-ENTMCNC: 31.5 G/DL (ref 31.5–36.5)
MCHC RBC AUTO-ENTMCNC: 32.4 G/DL (ref 31.5–36.5)
MCHC RBC AUTO-ENTMCNC: 33.2 G/DL (ref 31.5–36.5)
MCV RBC AUTO: 95 FL (ref 78–100)
MCV RBC AUTO: 96 FL (ref 78–100)
MCV RBC AUTO: 98 FL (ref 78–100)
PCO2 BLDV: 47 MM HG (ref 40–50)
PH BLDV: 7.32 [PH] (ref 7.32–7.43)
PHOSPHATE SERPL-MCNC: 12 MG/DL (ref 2.5–4.5)
PHOSPHATE SERPL-MCNC: 3.9 MG/DL (ref 2.5–4.5)
PHOSPHATE SERPL-MCNC: 5.1 MG/DL (ref 2.5–4.5)
PLATELET # BLD AUTO: 218 10E3/UL (ref 150–450)
PLATELET # BLD AUTO: 244 10E3/UL (ref 150–450)
PLATELET # BLD AUTO: 257 10E3/UL (ref 150–450)
PO2 BLDV: 44 MM HG (ref 25–47)
POTASSIUM BLD-SCNC: 3.7 MMOL/L (ref 3.4–5.3)
POTASSIUM SERPL-SCNC: 3.8 MMOL/L (ref 3.4–5.3)
POTASSIUM SERPL-SCNC: 4 MMOL/L (ref 3.4–5.3)
POTASSIUM SERPL-SCNC: 4.1 MMOL/L (ref 3.4–5.3)
PREALB SERPL IA-MCNC: 15 MG/DL (ref 15–45)
PROT SERPL-MCNC: 5.5 G/DL (ref 6.4–8.3)
PROT SERPL-MCNC: 6.7 G/DL (ref 6.4–8.3)
RBC # BLD AUTO: 2.19 10E6/UL (ref 4.4–5.9)
RBC # BLD AUTO: 2.99 10E6/UL (ref 4.4–5.9)
RBC # BLD AUTO: 2.99 10E6/UL (ref 4.4–5.9)
SAO2 % BLDV: 76 % (ref 94–100)
SODIUM BLD-SCNC: 140 MMOL/L (ref 133–144)
SODIUM SERPL-SCNC: 138 MMOL/L (ref 135–145)
SODIUM SERPL-SCNC: 140 MMOL/L (ref 135–145)
SODIUM SERPL-SCNC: 144 MMOL/L (ref 135–145)
TROPONIN T SERPL HS-MCNC: 319 NG/L
TROPONIN T SERPL HS-MCNC: 347 NG/L
UFH PPP CHRO-ACNC: 0.48 IU/ML
UFH PPP CHRO-ACNC: 0.65 IU/ML
UNIT ABO/RH: NORMAL
UNIT NUMBER: NORMAL
UNIT STATUS: NORMAL
UNIT TYPE ISBT: 5100
WBC # BLD AUTO: 13.4 10E3/UL (ref 4–11)
WBC # BLD AUTO: 24.5 10E3/UL (ref 4–11)
WBC # BLD AUTO: 25.4 10E3/UL (ref 4–11)

## 2023-10-13 PROCEDURE — 80048 BASIC METABOLIC PNL TOTAL CA: CPT | Performed by: SURGERY

## 2023-10-13 PROCEDURE — 83735 ASSAY OF MAGNESIUM: CPT | Performed by: PHYSICIAN ASSISTANT

## 2023-10-13 PROCEDURE — 82248 BILIRUBIN DIRECT: CPT

## 2023-10-13 PROCEDURE — 250N000011 HC RX IP 250 OP 636: Performed by: SURGERY

## 2023-10-13 PROCEDURE — 370N000003 HC ANESTHESIA WARD SERVICE: Performed by: ANESTHESIOLOGY

## 2023-10-13 PROCEDURE — 71260 CT THORAX DX C+: CPT | Mod: 26 | Performed by: RADIOLOGY

## 2023-10-13 PROCEDURE — 74018 RADEX ABDOMEN 1 VIEW: CPT | Mod: 26 | Performed by: RADIOLOGY

## 2023-10-13 PROCEDURE — 94002 VENT MGMT INPAT INIT DAY: CPT

## 2023-10-13 PROCEDURE — 93005 ELECTROCARDIOGRAM TRACING: CPT

## 2023-10-13 PROCEDURE — 97110 THERAPEUTIC EXERCISES: CPT | Mod: GP | Performed by: PHYSICAL THERAPIST

## 2023-10-13 PROCEDURE — 85730 THROMBOPLASTIN TIME PARTIAL: CPT | Performed by: SURGERY

## 2023-10-13 PROCEDURE — 71260 CT THORAX DX C+: CPT

## 2023-10-13 PROCEDURE — 85027 COMPLETE CBC AUTOMATED: CPT | Performed by: SURGERY

## 2023-10-13 PROCEDURE — 82040 ASSAY OF SERUM ALBUMIN: CPT

## 2023-10-13 PROCEDURE — 258N000003 HC RX IP 258 OP 636: Performed by: CLINICAL NURSE SPECIALIST

## 2023-10-13 PROCEDURE — 999N000065 XR ABDOMEN PORT 1 VIEW

## 2023-10-13 PROCEDURE — 85610 PROTHROMBIN TIME: CPT | Performed by: SURGERY

## 2023-10-13 PROCEDURE — 250N000009 HC RX 250: Performed by: STUDENT IN AN ORGANIZED HEALTH CARE EDUCATION/TRAINING PROGRAM

## 2023-10-13 PROCEDURE — 84484 ASSAY OF TROPONIN QUANT: CPT | Performed by: SURGERY

## 2023-10-13 PROCEDURE — 90935 HEMODIALYSIS ONE EVALUATION: CPT

## 2023-10-13 PROCEDURE — 71045 X-RAY EXAM CHEST 1 VIEW: CPT | Mod: 26 | Performed by: RADIOLOGY

## 2023-10-13 PROCEDURE — 80053 COMPREHEN METABOLIC PANEL: CPT | Performed by: SURGERY

## 2023-10-13 PROCEDURE — 84100 ASSAY OF PHOSPHORUS: CPT | Performed by: SURGERY

## 2023-10-13 PROCEDURE — 200N000002 HC R&B ICU UMMC

## 2023-10-13 PROCEDURE — 258N000003 HC RX IP 258 OP 636: Performed by: STUDENT IN AN ORGANIZED HEALTH CARE EDUCATION/TRAINING PROGRAM

## 2023-10-13 PROCEDURE — 999N000065 XR CHEST PORT 1 VIEW

## 2023-10-13 PROCEDURE — 85520 HEPARIN ASSAY: CPT

## 2023-10-13 PROCEDURE — 84134 ASSAY OF PREALBUMIN: CPT | Performed by: SURGERY

## 2023-10-13 PROCEDURE — 85520 HEPARIN ASSAY: CPT | Performed by: SURGERY

## 2023-10-13 PROCEDURE — 250N000013 HC RX MED GY IP 250 OP 250 PS 637: Performed by: ANESTHESIOLOGY

## 2023-10-13 PROCEDURE — P9045 ALBUMIN (HUMAN), 5%, 250 ML: HCPCS | Mod: JZ

## 2023-10-13 PROCEDURE — 250N000011 HC RX IP 250 OP 636: Performed by: STUDENT IN AN ORGANIZED HEALTH CARE EDUCATION/TRAINING PROGRAM

## 2023-10-13 PROCEDURE — 83605 ASSAY OF LACTIC ACID: CPT | Performed by: SURGERY

## 2023-10-13 PROCEDURE — 85610 PROTHROMBIN TIME: CPT

## 2023-10-13 PROCEDURE — 82330 ASSAY OF CALCIUM: CPT | Performed by: PHYSICIAN ASSISTANT

## 2023-10-13 PROCEDURE — 250N000013 HC RX MED GY IP 250 OP 250 PS 637

## 2023-10-13 PROCEDURE — 999N000157 HC STATISTIC RCP TIME EA 10 MIN

## 2023-10-13 PROCEDURE — 250N000011 HC RX IP 250 OP 636

## 2023-10-13 PROCEDURE — 82330 ASSAY OF CALCIUM: CPT | Performed by: SURGERY

## 2023-10-13 PROCEDURE — 84484 ASSAY OF TROPONIN QUANT: CPT

## 2023-10-13 PROCEDURE — 83605 ASSAY OF LACTIC ACID: CPT

## 2023-10-13 PROCEDURE — 250N000009 HC RX 250: Performed by: SURGERY

## 2023-10-13 PROCEDURE — 84100 ASSAY OF PHOSPHORUS: CPT | Performed by: PHYSICIAN ASSISTANT

## 2023-10-13 PROCEDURE — 85730 THROMBOPLASTIN TIME PARTIAL: CPT

## 2023-10-13 PROCEDURE — 93010 ELECTROCARDIOGRAM REPORT: CPT | Performed by: INTERNAL MEDICINE

## 2023-10-13 PROCEDURE — 74177 CT ABD & PELVIS W/CONTRAST: CPT | Mod: 26 | Performed by: RADIOLOGY

## 2023-10-13 PROCEDURE — 84100 ASSAY OF PHOSPHORUS: CPT

## 2023-10-13 PROCEDURE — 99233 SBSQ HOSP IP/OBS HIGH 50: CPT | Mod: FS | Performed by: CLINICAL NURSE SPECIALIST

## 2023-10-13 PROCEDURE — 83735 ASSAY OF MAGNESIUM: CPT

## 2023-10-13 PROCEDURE — 250N000011 HC RX IP 250 OP 636: Mod: JZ

## 2023-10-13 PROCEDURE — 999N000035 HC STATISTIC CODE BLUE NO ACCESS REQUIRED

## 2023-10-13 PROCEDURE — 250N000013 HC RX MED GY IP 250 OP 250 PS 637: Performed by: SURGERY

## 2023-10-13 PROCEDURE — 83605 ASSAY OF LACTIC ACID: CPT | Performed by: PHYSICIAN ASSISTANT

## 2023-10-13 PROCEDURE — P9016 RBC LEUKOCYTES REDUCED: HCPCS

## 2023-10-13 PROCEDURE — 83735 ASSAY OF MAGNESIUM: CPT | Performed by: SURGERY

## 2023-10-13 PROCEDURE — 85384 FIBRINOGEN ACTIVITY: CPT

## 2023-10-13 PROCEDURE — 250N000009 HC RX 250

## 2023-10-13 PROCEDURE — 250N000013 HC RX MED GY IP 250 OP 250 PS 637: Performed by: STUDENT IN AN ORGANIZED HEALTH CARE EDUCATION/TRAINING PROGRAM

## 2023-10-13 PROCEDURE — 85027 COMPLETE CBC AUTOMATED: CPT

## 2023-10-13 PROCEDURE — 82330 ASSAY OF CALCIUM: CPT

## 2023-10-13 RX ORDER — HYDROMORPHONE HYDROCHLORIDE 1 MG/ML
0.3 INJECTION, SOLUTION INTRAMUSCULAR; INTRAVENOUS; SUBCUTANEOUS DAILY PRN
Status: DISCONTINUED | OUTPATIENT
Start: 2023-10-13 | End: 2023-10-20

## 2023-10-13 RX ORDER — DEXTROSE MONOHYDRATE 25 G/50ML
25-50 INJECTION, SOLUTION INTRAVENOUS
Status: DISCONTINUED | OUTPATIENT
Start: 2023-10-13 | End: 2023-10-13

## 2023-10-13 RX ORDER — IOPAMIDOL 755 MG/ML
INJECTION, SOLUTION INTRAVASCULAR ONCE
Status: CANCELLED | OUTPATIENT
Start: 2023-10-13 | End: 2023-10-13

## 2023-10-13 RX ORDER — PROPOFOL 10 MG/ML
INJECTION, EMULSION INTRAVENOUS PRN
Status: DISCONTINUED | OUTPATIENT
Start: 2023-10-13 | End: 2023-10-13

## 2023-10-13 RX ORDER — NICOTINE POLACRILEX 4 MG
15-30 LOZENGE BUCCAL
Status: DISCONTINUED | OUTPATIENT
Start: 2023-10-13 | End: 2023-10-13

## 2023-10-13 RX ORDER — NOREPINEPHRINE BITARTRATE 0.06 MG/ML
.01-.6 INJECTION, SOLUTION INTRAVENOUS CONTINUOUS
Status: DISCONTINUED | OUTPATIENT
Start: 2023-10-13 | End: 2023-10-17

## 2023-10-13 RX ORDER — PROPOFOL 10 MG/ML
5-75 INJECTION, EMULSION INTRAVENOUS CONTINUOUS
Status: DISCONTINUED | OUTPATIENT
Start: 2023-10-13 | End: 2023-10-15

## 2023-10-13 RX ORDER — IOPAMIDOL 755 MG/ML
104 INJECTION, SOLUTION INTRAVASCULAR ONCE
Status: COMPLETED | OUTPATIENT
Start: 2023-10-13 | End: 2023-10-13

## 2023-10-13 RX ORDER — CHLORHEXIDINE GLUCONATE ORAL RINSE 1.2 MG/ML
15 SOLUTION DENTAL EVERY 12 HOURS
Status: DISCONTINUED | OUTPATIENT
Start: 2023-10-13 | End: 2023-10-19

## 2023-10-13 RX ADMIN — SODIUM CHLORIDE 300 ML: 9 INJECTION, SOLUTION INTRAVENOUS at 16:08

## 2023-10-13 RX ADMIN — PROPOFOL 20 MCG/KG/MIN: 10 INJECTION, EMULSION INTRAVENOUS at 23:34

## 2023-10-13 RX ADMIN — NYSTATIN 500000 UNITS: 100000 SUSPENSION ORAL at 12:09

## 2023-10-13 RX ADMIN — ATORVASTATIN CALCIUM 10 MG: 10 TABLET, FILM COATED ORAL at 21:49

## 2023-10-13 RX ADMIN — INSULIN ASPART 1 UNITS: 100 INJECTION, SOLUTION INTRAVENOUS; SUBCUTANEOUS at 08:18

## 2023-10-13 RX ADMIN — ALBUMIN HUMAN 25 G: 0.05 INJECTION, SOLUTION INTRAVENOUS at 23:34

## 2023-10-13 RX ADMIN — CHLORHEXIDINE GLUCONATE 15 ML: 1.2 RINSE ORAL at 21:48

## 2023-10-13 RX ADMIN — NOREPINEPHRINE BITARTRATE 6.4 MCG: 1 INJECTION, SOLUTION, CONCENTRATE INTRAVENOUS at 20:17

## 2023-10-13 RX ADMIN — OXYCODONE HYDROCHLORIDE 5 MG: 5 TABLET ORAL at 10:54

## 2023-10-13 RX ADMIN — NYSTATIN 500000 UNITS: 100000 SUSPENSION ORAL at 21:48

## 2023-10-13 RX ADMIN — Medication 5 MG: at 21:49

## 2023-10-13 RX ADMIN — NYSTATIN 500000 UNITS: 100000 SUSPENSION ORAL at 08:13

## 2023-10-13 RX ADMIN — ACETAMINOPHEN 975 MG: 325 TABLET, FILM COATED ORAL at 13:57

## 2023-10-13 RX ADMIN — HEPARIN SODIUM 1900 UNITS/HR: 10000 INJECTION, SOLUTION INTRAVENOUS at 01:12

## 2023-10-13 RX ADMIN — GABAPENTIN 300 MG: 300 CAPSULE ORAL at 21:48

## 2023-10-13 RX ADMIN — ACETAMINOPHEN 975 MG: 325 TABLET, FILM COATED ORAL at 21:48

## 2023-10-13 RX ADMIN — HEPARIN SODIUM 1900 UNITS/HR: 10000 INJECTION, SOLUTION INTRAVENOUS at 14:16

## 2023-10-13 RX ADMIN — ACETAMINOPHEN 975 MG: 325 TABLET, FILM COATED ORAL at 08:13

## 2023-10-13 RX ADMIN — PROPOFOL 40 MG: 10 INJECTION, EMULSION INTRAVENOUS at 20:13

## 2023-10-13 RX ADMIN — NYSTATIN 500000 UNITS: 100000 SUSPENSION ORAL at 15:34

## 2023-10-13 RX ADMIN — IOPAMIDOL 104 ML: 755 INJECTION, SOLUTION INTRAVENOUS at 23:23

## 2023-10-13 RX ADMIN — INSULIN ASPART 1 UNITS: 100 INJECTION, SOLUTION INTRAVENOUS; SUBCUTANEOUS at 12:13

## 2023-10-13 RX ADMIN — ROCURONIUM BROMIDE 100 MG: 50 INJECTION, SOLUTION INTRAVENOUS at 20:13

## 2023-10-13 RX ADMIN — MAGNESIUM SULFATE HEPTAHYDRATE: 500 INJECTION, SOLUTION INTRAMUSCULAR; INTRAVENOUS at 22:08

## 2023-10-13 RX ADMIN — MIDODRINE HYDROCHLORIDE 20 MG: 5 TABLET ORAL at 16:12

## 2023-10-13 RX ADMIN — INSULIN ASPART 1 UNITS: 100 INJECTION, SOLUTION INTRAVENOUS; SUBCUTANEOUS at 03:28

## 2023-10-13 RX ADMIN — INSULIN ASPART 1 UNITS: 100 INJECTION, SOLUTION INTRAVENOUS; SUBCUTANEOUS at 23:56

## 2023-10-13 RX ADMIN — SENNOSIDES AND DOCUSATE SODIUM 1 TABLET: 50; 8.6 TABLET ORAL at 08:13

## 2023-10-13 RX ADMIN — ACETAMINOPHEN 975 MG: 325 TABLET, FILM COATED ORAL at 02:29

## 2023-10-13 RX ADMIN — SENNOSIDES AND DOCUSATE SODIUM 1 TABLET: 50; 8.6 TABLET ORAL at 21:49

## 2023-10-13 RX ADMIN — HYDROMORPHONE HYDROCHLORIDE 0.3 MG: 1 INJECTION, SOLUTION INTRAMUSCULAR; INTRAVENOUS; SUBCUTANEOUS at 14:10

## 2023-10-13 RX ADMIN — PROPOFOL 20 MCG/KG/MIN: 10 INJECTION, EMULSION INTRAVENOUS at 21:50

## 2023-10-13 RX ADMIN — SODIUM CHLORIDE 250 ML: 9 INJECTION, SOLUTION INTRAVENOUS at 16:07

## 2023-10-13 RX ADMIN — HYDROXYZINE HYDROCHLORIDE 25 MG: 25 TABLET, FILM COATED ORAL at 13:57

## 2023-10-13 RX ADMIN — NOREPINEPHRINE BITARTRATE 0.03 MCG/KG/MIN: 0.06 INJECTION, SOLUTION INTRAVENOUS at 21:55

## 2023-10-13 RX ADMIN — INSULIN ASPART 1 UNITS: 100 INJECTION, SOLUTION INTRAVENOUS; SUBCUTANEOUS at 15:37

## 2023-10-13 ASSESSMENT — ACTIVITIES OF DAILY LIVING (ADL)
ADLS_ACUITY_SCORE: 45

## 2023-10-13 NOTE — PROGRESS NOTES
Vascular Surgery Progress Note  10/13/2023       Subjective:  Resting in bed this am. Desaturation episode overnight requiring escalation to 10L. CXR nonrevealing, episode resolved on 3L oxymask this am.  NGT output was 1800ml. Hgb 6.9 this am, getting 1u pRBCs received 1 unit PRBCs. Remains afebrile, on 3LNC.     VAC output was 200 mL in the last 24 hours.    Objective:  Temp:  [98.1  F (36.7  C)-99.7  F (37.6  C)] 98.6  F (37  C)  Pulse:  [107-123] 112  Resp:  [13-33] 18  BP: ()/() 131/84  Cuff Mean (mmHg):  [101] 101  MAP:  [81 mmHg-99 mmHg] 81 mmHg  Arterial Line BP: (124-141)/(58-73) 124/58  SpO2:  [88 %-98 %] 96 %    I/O last 3 completed shifts:  In: 2218.61 [I.V.:515; NG/GT:250; IV Piggyback:500]  Out: 1650 [Emesis/NG output:1400; Drains:250]      Gen: Wakes up to conversation, resting comfortably in bed in ICU.  CV: RR per DP pulse, off pressors, sinus tach per tele with PACs  Resp: On NC, non-labored  Abd: Wound vac in place, holding seal, abdomen nontender to palpation around VAC   Ext: RLE wwp. LLE cool to touch, dusky. Blistering appreciated from knee to foot. VAC changes M/W/F  Abdominal incision with vac in place holding seal.     Labs:  Recent Labs   Lab 10/13/23  0320 10/12/23  0323 10/11/23  0312   WBC 13.4* 12.4* 14.0*   HGB 6.9* 6.8* 7.4*    228 192       Recent Labs   Lab 10/13/23  0326 10/13/23  0320 10/12/23  2333 10/12/23  0327 10/12/23  0323 10/11/23  0341 10/11/23  0312   NA  --  144  --   --  143  --  139   POTASSIUM  --  4.0  --   --  4.0  --  3.7   CHLORIDE  --  85*  --   --  87*  --  92*   CO2  --  39*  --   --  36*  --  31*   BUN  --  111.0*  --   --  90.4*  --  65.1*   CR  --  7.56*  --   --  6.21*  --  4.23*   * 142* 139*   < > 113*   < > 127*   OMAR  --  8.1*  --   --  8.5*  --  8.8   MAG  --  2.3  --   --  2.2  --  1.9   PHOS  --  12.0*  --   --  10.3*  --  6.7*    < > = values in this interval not displayed.      Assessment/Plan:   68 year old male with PMH  of HTN and previous TIA who presented with R sided abdominal pain found to have contained ruptured AAA, now s/p open AAA repair 9/24 into 9/25am with 30 min supraceliac clamp time, prolonged inter-renal clamp time, temporary abdominal closure. He did have bloody stool postop with flex sig 9/25 demonstrating ischemic mucosal changes of the rectum, however on repeated abdominal looks he has had no evidence of transmural necrosis of the small or large intestine, or the rectum. On 9/29 he was started on CRRT given ongoing low UOP and rising Cr and failure of lasix challenge. Now on iHD. Otherwise hemodynamically continues to do well off pressors. Ischemic LLE unchanged, note DVT in this leg, this is to be expected given ischemia and absent inflow. We will continue to monitor LLE given it is not currently making patient systemically ill and there is no indication for urgent intervention. S/p closure of abdominal fascia and subcutaneous tissue left open with wound VAC applied 10/2/23. Left AKA on hold at this time, as leukocytosis improves and supplemental oxygen requirement downtrend. Continues to have large NGT output, high risk for aspiration and further worsening infectious process. Desaturation event overnight 10/12 with resolution, now back on 3L NC.     Neuro:   - Tylenol 975 q8 scheduled, prn oxy 5-10mg, gabapentin 300mg at bedtime, hydroxyzine 25mg prn  - Seroquel 12.5-25, melatonin 5mg at bedtime  - Stroke workup negative -- MRI with multiple small infarcts, follow-up with neurology 4-6 weeks after discharge, ok with a/c.   CV:   - Off pressors, midodrine 20mg daily prn with HD runs   - Goal SBP <160, MAP >65  - labetalol prn for SBP >160  - continue statin  - PE+, venous duplex with nonocclusive to occlusive thrombus of the left GSV from the level of the knee to the groin and nonocclusive thrombus of the left distal femoral vein and popliteal veins, increased in extent compared to 9/30/2023.   - Continue with  heparin gtt.  Resp:   - On 3L NC, encourage aggressive IS as able.   GI:   - NPO, NGT in place to LIS   - Continue TPN   - having Bms, still having high NG output  FEN:   - electrolyte replacement prn   Renal:   - Appreciate nephrology recommendations  - continue iHD  Heme:   -Hgb 6.9 today - 1u pRBC  -DVT as above, PE   -Continue hep gtt  - Out of bed to chair, PT/OT, passive ROM  ID:   - known aspiration event 10/9   - monitor signs of pneumonia  - nystatin swish for oral candida  MSK:   - L AKA on schedule for next week Tuesday, nerve block with RAPS team prior to case  Misc:   - VAC changes with vascular surgery M/W/F  - on hep gtt no dvt ppx needed  - physically keep in ICU today given asp event last night, will evaluate later today and consider re-engaging icu pending progress.       Discussed and seen patient with , vascular surgery attending     Will Donald MD   Vascular Surgery PGY3

## 2023-10-13 NOTE — PHARMACY-CONSULT NOTE
Pharmacy Consult Note    Pharmacist requested to assess medications for possible drug rash given patient developed new rash approximately 3 days ago.    All current medications reviewed. The following medications have been identified:  Atorvastatin: post marketing reports  Started 10/7  Gabapentin: rare reports  Started 10/3  Hydromorphone: undefined incidence  Started 10/3, previously received in Jan 2023  Methocarbamol: undefined incidence   Started 10/4  Midodrine: 2% incidence  Started 10/6  Oxycodone: </= 5% incidence  Started 10/3  Pantoprazole: 2% incidence  Last received 10/6  Quetiapine: 4% incidence  Started 10/3    No recent antimicrobial agents given, last on 10/3.    Discussed findings with provider. No clear medication cause for rash. Possible cause may be laundry detergent based on site of rash. Plan to consult dermatology. Could consider stopping above medications in stepwise fashion if other causes ruled out.    Thanks,   Megan Grullon, Pharm.D., BCPS

## 2023-10-13 NOTE — PLAN OF CARE
Major Shift Events:  Neuros unchanged; L pupil>R. Both brisk. Tachycardic into the 110s. SBP <160 without intervention. Tmax 99.7. On 3L O2. NG to LIS. NPO ex ice chips. No BM this shift. TPN+Lipids infusing. Not voiding, bladder scanned <100. Wound vac to LLE + abd to -125. Hep gtt therapeutic; 1900 units/hr. 1 unit RBC given for hgb 6.9.   Plan: Transfer to [facility] discontinued. Plan for L AKA tentatively Monday.   For vital signs and complete assessments, please see documentation flowsheets.

## 2023-10-13 NOTE — PROGRESS NOTES
Nephrology Progress Note  10/13/2023       Alfredo Burnham is a 68 yom with complex hx of TIA, HTN, blindness who presented to [facility] 9/24 with severe abdominal pain radiating to flank and back, CT revealed AAA with a contained rupture.  Taken to OR for aortobiiliac bypass and resection of ruptured pararenal aneurysm.   Post op course complicated by hypotension requiring pressors and metabolic acidosis.  Baseline Cr 1.0 on presentation but on the rise to >5 at time of renal consult for MAYA management and possible RRT.       Interval History :   Mr Burnham had CRRT stopped 10/4, stable on iHD since.  Planning HD today 1-2L as BP's are stable.  Noted to have very large GI output and high bicarb due to this so running on low bicarb bath.  Still hopeful for recovery as BP's have been quite stable and makes a bit of intermittent UOP.  We are now ~2 weeks into RRT, line is from 10/6 as it was moved from femoral to internal jugular so will hold on tunneled line until next week.      Assessment & Recommendations:   MAYA-Baseline Cr 1.0, ordered UA.  CT showed benign cyst but otherwise normal kidneys.  Cr on the rise since surgery, did receive contrast for CTA.  Urine microscopy showed granular casts suggesting ATN which will recover with time and stabilizing hemodynamics.  Started on CRRT 9/30 for volume and clearance with minimal UOP and rising Cr.                  -Started CRRT 9/30 for volume and clearance, stopped 10/4 and now running iHD PRN and watching for recovery.       -HD today, 4h, 1-2L of UF, low bicarb bath.        -Line is RI temp line from 10/6                 -Dialysis consent signed and scanned into media tab.      Volume-Wt on downtrend, still some edema on exam but resp status stable.  Holding on run today.      Electrolytes-K 4.0, bicarb 39 (likely due to GI losses with large GI output), Na 144.        BMD-Ca 8.1, Mg 2.3.  Phos noted to be high at 12, would use binder but we are stopping TF and  "starting TPN so binder would not be effective.       Anemia-Hgb 6.9, ~14 on presentation, acute management per team.       Nutrition-TPN started 10/12, recommend no Phos in TPN      Time spent: 40 minutes on this date of encounter for chart review, physical exam, medical decision making and co-ordination of care.      Seen and discussed with Dr Carranza     Recommendations were communicated to primary team via verbal communication.        Bebeto Sesay, APRN CNS  Clinical Nurse Specialist  [phone]    Review of Systems:   I reviewed the following systems:  ROS not done due to lethargy    Physical Exam:   I/O last 3 completed shifts:  In: 2218.61 [I.V.:515; NG/GT:250; IV Piggyback:500]  Out: 1650 [Emesis/NG output:1400; Drains:250]   /83   Pulse 110   Temp 99.4  F (37.4  C) (Axillary)   Resp 19   Ht 1.727 m (5' 8\")   Wt 76.7 kg (169 lb 1.5 oz)   SpO2 96%   BMI 25.71 kg/m       GENERAL APPEARANCE: Extubated, lethargic, in no distress.   EYES: No scleral icterus  Pulmonary: On vent 40%/8 of PEEP  CV: Regular rhythm, normal rate   - Edema +1-2 generalized, + scrotal edema.    GI: distended, nontender  MS: no evidence of inflammation in joints, no muscle tenderness  : No Lu  SKIN: no rash, warm, dry  NEURO: No focal deficits.         Labs:   All labs reviewed by me  Electrolytes/Renal -   Recent Labs   Lab Test 10/13/23  0817 10/13/23  0326 10/13/23  0320 10/12/23  0327 10/12/23  0323 10/11/23  0341 10/11/23  0312   NA  --   --  144  --  143  --  139   POTASSIUM  --   --  4.0  --  4.0  --  3.7   CHLORIDE  --   --  85*  --  87*  --  92*   CO2  --   --  39*  --  36*  --  31*   BUN  --   --  111.0*  --  90.4*  --  65.1*   CR  --   --  7.56*  --  6.21*  --  4.23*   * 140* 142*   < > 113*   < > 127*   OMAR  --   --  8.1*  --  8.5*  --  8.8   MAG  --   --  2.3  --  2.2  --  1.9   PHOS  --   --  12.0*  --  10.3*  --  6.7*    < > = values in this interval not displayed.       CBC -   Recent Labs   Lab " Test 10/13/23  0320 10/12/23  0323 10/11/23  0312   WBC 13.4* 12.4* 14.0*   HGB 6.9* 6.8* 7.4*    228 192       LFTs -   Recent Labs   Lab Test 10/13/23  0320 10/12/23  0323 10/11/23  0312   ALKPHOS 91 107 124   BILITOTAL 0.4 0.5 0.5   ALT 30 28 28   AST 57* 48* 44   PROTTOTAL 5.5* 5.7* 5.8*   ALBUMIN 2.6* 2.7* 2.6*       Iron Panel - No lab results found.        Current Medications:   acetaminophen  975 mg Oral or Feeding Tube Q6H    atorvastatin  10 mg Oral or Feeding Tube QPM    gabapentin  300 mg Oral or Feeding Tube At Bedtime    insulin aspart  1-12 Units Subcutaneous Q4H    lipids plant base  250 mL Intravenous Q24H    melatonin  5 mg Oral or Feeding Tube QPM    nystatin  500,000 Units Oral 4x Daily    polyethylene glycol  17 g Oral or Feeding Tube Daily    QUEtiapine  12.5-25 mg Oral or Feeding Tube At Bedtime    senna-docusate  1 tablet Oral or Feeding Tube BID    sodium chloride (PF)  3 mL Intravenous Q8H    sodium chloride (PF)  3 mL Intracatheter Q8H      dextrose      dextrose      dextrose      heparin 1,900 Units/hr (10/13/23 0700)    parenteral nutrition - ADULT compounded formula 45 mL/hr at 10/13/23 0700

## 2023-10-13 NOTE — PLAN OF CARE
Transferred to: Unit Dialysis  at 1600.  Belongings: Transported to  to his new room.  Lu removed? No: N/A  Central line removed? NA  Chart and medications sent with patient Yes  Family notified: Yes    Pain well controlled.  Up in chair for 4hrs, well tolerated.  Large spontaneously voided X 2.  Currently getting HD.  Stomach and Leg wound vacs changed.  Report given to , pt to transport from HD to  after dialysis.  Family aware of transfer.  All belongings transported to .  Pt stable upon transfer to dialysis.

## 2023-10-14 LAB
ALBUMIN SERPL BCG-MCNC: 3.1 G/DL (ref 3.5–5.2)
ALLEN'S TEST: NO
ALLEN'S TEST: NO
ALP SERPL-CCNC: 82 U/L (ref 40–129)
ALT SERPL W P-5'-P-CCNC: 39 U/L (ref 0–70)
ANION GAP SERPL CALCULATED.3IONS-SCNC: 14 MMOL/L (ref 7–15)
AST SERPL W P-5'-P-CCNC: 66 U/L (ref 0–45)
BACTERIA BLD CULT: NO GROWTH
BACTERIA BLD CULT: NO GROWTH
BASE EXCESS BLDA CALC-SCNC: 1.3 MMOL/L (ref -9–1.8)
BASE EXCESS BLDA CALC-SCNC: 1.3 MMOL/L (ref -9–1.8)
BASE EXCESS BLDV CALC-SCNC: 1.2 MMOL/L (ref -7.7–1.9)
BASO+EOS+MONOS # BLD AUTO: ABNORMAL 10*3/UL
BASO+EOS+MONOS NFR BLD AUTO: ABNORMAL %
BASOPHILS # BLD AUTO: 0.1 10E3/UL (ref 0–0.2)
BASOPHILS NFR BLD AUTO: 1 %
BILIRUB DIRECT SERPL-MCNC: 0.25 MG/DL (ref 0–0.3)
BILIRUB SERPL-MCNC: 0.4 MG/DL
BLD PROD TYP BPU: NORMAL
BLOOD COMPONENT TYPE: NORMAL
BUN SERPL-MCNC: 59.6 MG/DL (ref 8–23)
CA-I BLD-MCNC: 4.3 MG/DL (ref 4.4–5.2)
CALCIUM SERPL-MCNC: 8.2 MG/DL (ref 8.8–10.2)
CHLORIDE SERPL-SCNC: 97 MMOL/L (ref 98–107)
CODING SYSTEM: NORMAL
CREAT SERPL-MCNC: 4.19 MG/DL (ref 0.67–1.17)
CROSSMATCH: NORMAL
DEPRECATED HCO3 PLAS-SCNC: 26 MMOL/L (ref 22–29)
EGFRCR SERPLBLD CKD-EPI 2021: 15 ML/MIN/1.73M2
EOSINOPHIL # BLD AUTO: 1.4 10E3/UL (ref 0–0.7)
EOSINOPHIL NFR BLD AUTO: 9 %
ERYTHROCYTE [DISTWIDTH] IN BLOOD BY AUTOMATED COUNT: 16.7 % (ref 10–15)
ERYTHROCYTE [DISTWIDTH] IN BLOOD BY AUTOMATED COUNT: 16.9 % (ref 10–15)
ERYTHROCYTE [DISTWIDTH] IN BLOOD BY AUTOMATED COUNT: 17.1 % (ref 10–15)
ERYTHROCYTE [DISTWIDTH] IN BLOOD BY AUTOMATED COUNT: 17.3 % (ref 10–15)
GLUCOSE BLDC GLUCOMTR-MCNC: 129 MG/DL (ref 70–99)
GLUCOSE BLDC GLUCOMTR-MCNC: 130 MG/DL (ref 70–99)
GLUCOSE BLDC GLUCOMTR-MCNC: 131 MG/DL (ref 70–99)
GLUCOSE BLDC GLUCOMTR-MCNC: 134 MG/DL (ref 70–99)
GLUCOSE BLDC GLUCOMTR-MCNC: 136 MG/DL (ref 70–99)
GLUCOSE BLDC GLUCOMTR-MCNC: 139 MG/DL (ref 70–99)
GLUCOSE SERPL-MCNC: 150 MG/DL (ref 70–99)
HCO3 BLD-SCNC: 26 MMOL/L (ref 21–28)
HCO3 BLD-SCNC: 26 MMOL/L (ref 21–28)
HCO3 BLDV-SCNC: 26 MMOL/L (ref 21–28)
HCT VFR BLD AUTO: 22.2 % (ref 40–53)
HCT VFR BLD AUTO: 22.2 % (ref 40–53)
HCT VFR BLD AUTO: 23.8 % (ref 40–53)
HCT VFR BLD AUTO: 24.3 % (ref 40–53)
HGB BLD-MCNC: 7.1 G/DL (ref 13.3–17.7)
HGB BLD-MCNC: 7.1 G/DL (ref 13.3–17.7)
HGB BLD-MCNC: 7.8 G/DL (ref 13.3–17.7)
HGB BLD-MCNC: 7.8 G/DL (ref 13.3–17.7)
HOLD SPECIMEN: NORMAL
IMM GRANULOCYTES # BLD: 0.4 10E3/UL
IMM GRANULOCYTES NFR BLD: 3 %
ISSUE DATE AND TIME: NORMAL
LACTATE SERPL-SCNC: 1.6 MMOL/L (ref 0.7–2)
LIPASE SERPL-CCNC: 51 U/L (ref 13–60)
LYMPHOCYTES # BLD AUTO: 0.5 10E3/UL (ref 0.8–5.3)
LYMPHOCYTES NFR BLD AUTO: 3 %
MAGNESIUM SERPL-MCNC: 1.8 MG/DL (ref 1.7–2.3)
MCH RBC QN AUTO: 31.1 PG (ref 26.5–33)
MCH RBC QN AUTO: 31.4 PG (ref 26.5–33)
MCH RBC QN AUTO: 31.6 PG (ref 26.5–33)
MCH RBC QN AUTO: 31.6 PG (ref 26.5–33)
MCHC RBC AUTO-ENTMCNC: 32 G/DL (ref 31.5–36.5)
MCHC RBC AUTO-ENTMCNC: 32 G/DL (ref 31.5–36.5)
MCHC RBC AUTO-ENTMCNC: 32.1 G/DL (ref 31.5–36.5)
MCHC RBC AUTO-ENTMCNC: 32.8 G/DL (ref 31.5–36.5)
MCV RBC AUTO: 95 FL (ref 78–100)
MCV RBC AUTO: 98 FL (ref 78–100)
MCV RBC AUTO: 98 FL (ref 78–100)
MCV RBC AUTO: 99 FL (ref 78–100)
MONOCYTES # BLD AUTO: 0.9 10E3/UL (ref 0–1.3)
MONOCYTES NFR BLD AUTO: 6 %
NEUTROPHILS # BLD AUTO: 13.3 10E3/UL (ref 1.6–8.3)
NEUTROPHILS NFR BLD AUTO: 78 %
NRBC # BLD AUTO: 0 10E3/UL
NRBC BLD AUTO-RTO: 0 /100
O2/TOTAL GAS SETTING VFR VENT: 40 %
O2/TOTAL GAS SETTING VFR VENT: 60 %
O2/TOTAL GAS SETTING VFR VENT: 60 %
OXYHGB MFR BLD: 96 % (ref 92–100)
OXYHGB MFR BLDV: 98 % (ref 70–75)
PCO2 BLD: 41 MM HG (ref 35–45)
PCO2 BLD: 41 MM HG (ref 35–45)
PCO2 BLDV: 38 MM HG (ref 40–50)
PH BLD: 7.41 [PH] (ref 7.35–7.45)
PH BLD: 7.41 [PH] (ref 7.35–7.45)
PH BLDV: 7.44 [PH] (ref 7.32–7.43)
PHOSPHATE SERPL-MCNC: 6.2 MG/DL (ref 2.5–4.5)
PLATELET # BLD AUTO: 157 10E3/UL (ref 150–450)
PLATELET # BLD AUTO: 162 10E3/UL (ref 150–450)
PLATELET # BLD AUTO: 168 10E3/UL (ref 150–450)
PLATELET # BLD AUTO: 173 10E3/UL (ref 150–450)
PO2 BLD: 85 MM HG (ref 80–105)
PO2 BLD: 85 MM HG (ref 80–105)
PO2 BLDV: 100 MM HG (ref 25–47)
POTASSIUM SERPL-SCNC: 4 MMOL/L (ref 3.4–5.3)
PROT SERPL-MCNC: 5.8 G/DL (ref 6.4–8.3)
RADIOLOGIST FLAGS: ABNORMAL
RBC # BLD AUTO: 2.25 10E6/UL (ref 4.4–5.9)
RBC # BLD AUTO: 2.26 10E6/UL (ref 4.4–5.9)
RBC # BLD AUTO: 2.47 10E6/UL (ref 4.4–5.9)
RBC # BLD AUTO: 2.51 10E6/UL (ref 4.4–5.9)
SODIUM SERPL-SCNC: 137 MMOL/L (ref 135–145)
TROPONIN T SERPL HS-MCNC: 327 NG/L
TROPONIN T SERPL HS-MCNC: 339 NG/L
UFH PPP CHRO-ACNC: 0.5 IU/ML
UNIT ABO/RH: NORMAL
UNIT NUMBER: NORMAL
UNIT STATUS: NORMAL
UNIT TYPE ISBT: 5100
WBC # BLD AUTO: 16.5 10E3/UL (ref 4–11)
WBC # BLD AUTO: 16.7 10E3/UL (ref 4–11)
WBC # BLD AUTO: 17.1 10E3/UL (ref 4–11)
WBC # BLD AUTO: 19 10E3/UL (ref 4–11)

## 2023-10-14 PROCEDURE — 93005 ELECTROCARDIOGRAM TRACING: CPT

## 2023-10-14 PROCEDURE — 85027 COMPLETE CBC AUTOMATED: CPT

## 2023-10-14 PROCEDURE — 36415 COLL VENOUS BLD VENIPUNCTURE: CPT | Performed by: INTERNAL MEDICINE

## 2023-10-14 PROCEDURE — 87040 BLOOD CULTURE FOR BACTERIA: CPT | Performed by: STUDENT IN AN ORGANIZED HEALTH CARE EDUCATION/TRAINING PROGRAM

## 2023-10-14 PROCEDURE — 84484 ASSAY OF TROPONIN QUANT: CPT

## 2023-10-14 PROCEDURE — 200N000002 HC R&B ICU UMMC

## 2023-10-14 PROCEDURE — 85025 COMPLETE CBC W/AUTO DIFF WBC: CPT

## 2023-10-14 PROCEDURE — 84100 ASSAY OF PHOSPHORUS: CPT | Performed by: PHYSICIAN ASSISTANT

## 2023-10-14 PROCEDURE — 36415 COLL VENOUS BLD VENIPUNCTURE: CPT

## 2023-10-14 PROCEDURE — 999N000157 HC STATISTIC RCP TIME EA 10 MIN

## 2023-10-14 PROCEDURE — 250N000013 HC RX MED GY IP 250 OP 250 PS 637

## 2023-10-14 PROCEDURE — 82803 BLOOD GASES ANY COMBINATION: CPT

## 2023-10-14 PROCEDURE — 93010 ELECTROCARDIOGRAM REPORT: CPT | Performed by: INTERNAL MEDICINE

## 2023-10-14 PROCEDURE — 85520 HEPARIN ASSAY: CPT | Performed by: SURGERY

## 2023-10-14 PROCEDURE — 82805 BLOOD GASES W/O2 SATURATION: CPT | Performed by: STUDENT IN AN ORGANIZED HEALTH CARE EDUCATION/TRAINING PROGRAM

## 2023-10-14 PROCEDURE — 250N000013 HC RX MED GY IP 250 OP 250 PS 637: Performed by: STUDENT IN AN ORGANIZED HEALTH CARE EDUCATION/TRAINING PROGRAM

## 2023-10-14 PROCEDURE — 83735 ASSAY OF MAGNESIUM: CPT | Performed by: PHYSICIAN ASSISTANT

## 2023-10-14 PROCEDURE — 250N000011 HC RX IP 250 OP 636: Mod: JZ

## 2023-10-14 PROCEDURE — P9016 RBC LEUKOCYTES REDUCED: HCPCS | Performed by: STUDENT IN AN ORGANIZED HEALTH CARE EDUCATION/TRAINING PROGRAM

## 2023-10-14 PROCEDURE — 82805 BLOOD GASES W/O2 SATURATION: CPT

## 2023-10-14 PROCEDURE — 250N000009 HC RX 250: Performed by: SURGERY

## 2023-10-14 PROCEDURE — 250N000011 HC RX IP 250 OP 636

## 2023-10-14 PROCEDURE — 99222 1ST HOSP IP/OBS MODERATE 55: CPT | Performed by: INTERNAL MEDICINE

## 2023-10-14 PROCEDURE — 94003 VENT MGMT INPAT SUBQ DAY: CPT

## 2023-10-14 PROCEDURE — 99291 CRITICAL CARE FIRST HOUR: CPT | Mod: GC | Performed by: SURGERY

## 2023-10-14 PROCEDURE — 83605 ASSAY OF LACTIC ACID: CPT | Performed by: PHYSICIAN ASSISTANT

## 2023-10-14 PROCEDURE — 85027 COMPLETE CBC AUTOMATED: CPT | Performed by: INTERNAL MEDICINE

## 2023-10-14 PROCEDURE — 80053 COMPREHEN METABOLIC PANEL: CPT | Performed by: PHYSICIAN ASSISTANT

## 2023-10-14 PROCEDURE — 82330 ASSAY OF CALCIUM: CPT | Performed by: PHYSICIAN ASSISTANT

## 2023-10-14 PROCEDURE — 85027 COMPLETE CBC AUTOMATED: CPT | Performed by: STUDENT IN AN ORGANIZED HEALTH CARE EDUCATION/TRAINING PROGRAM

## 2023-10-14 PROCEDURE — 99233 SBSQ HOSP IP/OBS HIGH 50: CPT | Performed by: STUDENT IN AN ORGANIZED HEALTH CARE EDUCATION/TRAINING PROGRAM

## 2023-10-14 PROCEDURE — 83690 ASSAY OF LIPASE: CPT

## 2023-10-14 PROCEDURE — 82248 BILIRUBIN DIRECT: CPT

## 2023-10-14 PROCEDURE — 36415 COLL VENOUS BLD VENIPUNCTURE: CPT | Performed by: STUDENT IN AN ORGANIZED HEALTH CARE EDUCATION/TRAINING PROGRAM

## 2023-10-14 PROCEDURE — 250N000013 HC RX MED GY IP 250 OP 250 PS 637: Performed by: SURGERY

## 2023-10-14 PROCEDURE — 250N000013 HC RX MED GY IP 250 OP 250 PS 637: Performed by: PHARMACIST

## 2023-10-14 PROCEDURE — 250N000011 HC RX IP 250 OP 636: Performed by: STUDENT IN AN ORGANIZED HEALTH CARE EDUCATION/TRAINING PROGRAM

## 2023-10-14 RX ORDER — MAGNESIUM SULFATE HEPTAHYDRATE 40 MG/ML
2 INJECTION, SOLUTION INTRAVENOUS ONCE
Status: COMPLETED | OUTPATIENT
Start: 2023-10-14 | End: 2023-10-14

## 2023-10-14 RX ORDER — CEFEPIME HYDROCHLORIDE 1 G/1
1 INJECTION, POWDER, FOR SOLUTION INTRAMUSCULAR; INTRAVENOUS EVERY 24 HOURS
Status: DISCONTINUED | OUTPATIENT
Start: 2023-10-14 | End: 2023-10-16

## 2023-10-14 RX ADMIN — PROPOFOL 25 MCG/KG/MIN: 10 INJECTION, EMULSION INTRAVENOUS at 08:15

## 2023-10-14 RX ADMIN — Medication 40 MG: at 07:50

## 2023-10-14 RX ADMIN — Medication 5 MG: at 19:31

## 2023-10-14 RX ADMIN — CHLORHEXIDINE GLUCONATE 15 ML: 1.2 RINSE ORAL at 07:46

## 2023-10-14 RX ADMIN — CHLORHEXIDINE GLUCONATE 15 ML: 1.2 RINSE ORAL at 19:30

## 2023-10-14 RX ADMIN — ACETAMINOPHEN 975 MG: 325 TABLET, FILM COATED ORAL at 07:46

## 2023-10-14 RX ADMIN — PROPOFOL 20 MCG/KG/MIN: 10 INJECTION, EMULSION INTRAVENOUS at 15:58

## 2023-10-14 RX ADMIN — CEFEPIME HYDROCHLORIDE 1 G: 1 INJECTION, POWDER, FOR SOLUTION INTRAMUSCULAR; INTRAVENOUS at 10:27

## 2023-10-14 RX ADMIN — NYSTATIN 500000 UNITS: 100000 SUSPENSION ORAL at 15:59

## 2023-10-14 RX ADMIN — NYSTATIN 500000 UNITS: 100000 SUSPENSION ORAL at 07:46

## 2023-10-14 RX ADMIN — SENNOSIDES AND DOCUSATE SODIUM 1 TABLET: 50; 8.6 TABLET ORAL at 19:31

## 2023-10-14 RX ADMIN — MAGNESIUM SULFATE IN WATER 2 G: 40 INJECTION, SOLUTION INTRAVENOUS at 08:51

## 2023-10-14 RX ADMIN — ACETAMINOPHEN 975 MG: 325 TABLET, FILM COATED ORAL at 14:20

## 2023-10-14 RX ADMIN — FAMOTIDINE 20 MG: 10 INJECTION, SOLUTION INTRAVENOUS at 18:32

## 2023-10-14 RX ADMIN — MAGNESIUM SULFATE HEPTAHYDRATE: 500 INJECTION, SOLUTION INTRAMUSCULAR; INTRAVENOUS at 19:32

## 2023-10-14 RX ADMIN — OXYCODONE HYDROCHLORIDE 5 MG: 5 TABLET ORAL at 14:20

## 2023-10-14 RX ADMIN — ACETAMINOPHEN 975 MG: 325 TABLET, FILM COATED ORAL at 02:02

## 2023-10-14 RX ADMIN — HEPARIN SODIUM 1900 UNITS/HR: 10000 INJECTION, SOLUTION INTRAVENOUS at 15:58

## 2023-10-14 RX ADMIN — GABAPENTIN 300 MG: 300 CAPSULE ORAL at 21:52

## 2023-10-14 RX ADMIN — Medication 12.5 MG: at 21:52

## 2023-10-14 RX ADMIN — FAMOTIDINE 20 MG: 10 INJECTION, SOLUTION INTRAVENOUS at 05:48

## 2023-10-14 RX ADMIN — OXYCODONE HYDROCHLORIDE 5 MG: 5 TABLET ORAL at 19:31

## 2023-10-14 RX ADMIN — ACETAMINOPHEN 975 MG: 325 TABLET, FILM COATED ORAL at 19:30

## 2023-10-14 RX ADMIN — NYSTATIN 500000 UNITS: 100000 SUSPENSION ORAL at 19:30

## 2023-10-14 RX ADMIN — SENNOSIDES AND DOCUSATE SODIUM 1 TABLET: 50; 8.6 TABLET ORAL at 07:46

## 2023-10-14 RX ADMIN — ATORVASTATIN CALCIUM 10 MG: 10 TABLET, FILM COATED ORAL at 19:31

## 2023-10-14 RX ADMIN — HEPARIN SODIUM 1900 UNITS/HR: 10000 INJECTION, SOLUTION INTRAVENOUS at 04:10

## 2023-10-14 RX ADMIN — OXYCODONE HYDROCHLORIDE 5 MG: 5 TABLET ORAL at 08:41

## 2023-10-14 RX ADMIN — NYSTATIN 500000 UNITS: 100000 SUSPENSION ORAL at 12:03

## 2023-10-14 ASSESSMENT — ACTIVITIES OF DAILY LIVING (ADL)
ADLS_ACUITY_SCORE: 45

## 2023-10-14 NOTE — H&P
SURGICAL ICU ADMISSION NOTE  10/13/2023    PRIMARY TEAM: Vascular Surgery  PRIMARY PHYSICIAN: Boris Lowe MD  REASON FOR CRITICAL CARE ADMISSION: Acute Hypoxic Respiratory Failure requiring intubation   ADMITTING PHYSICIAN: Gabino Barfield MD  Date of Service: 10/13/2023    ASSESSMENT:  Alfredo Burnham is a 68 year old male who was admitted to the SICU on 9/24/2023 s/p open AAA repair. Patient has a history of HTN and previous TIA. He presented to the ED on 9/24 with right sided abdominal pain and was found to have a contained, ruptured AAA on CT. 30 min super-celiac clamp time. Prolonged intra-renal clamp. 9/25 s/p flex sig showing rectal ischemia, abdominal washout showing a hemostatic AAA graft and no evidence of transmural colonic or small bowel ischemia, and an unsuccessful LLE embolectomy. 9/26 s/p repeat abdominal washout, retroperitoneal closure, LLE wound washout. 9/29 s/p repeat abd washout, bowel appeared well perfused w/o notable ischemia. OR 10/2 for abdominal fascia closure. Extubated 10/4. Unequal pupils and R facial droop, MRI brain showed multiple small infarcts, likely embolic. Patient was transferred out of the surgical ICU on 10/12. On 10/13, patient completed dialysis run and suddenly started experiencing desaturation. Patient was emergently intubated for Acute Hypoxic Respiratory Failure in dialysis unit and transferred to SICU.      PLAN:  - stat labs  - levo for BP support  - CT CAP given hypotension to look for infectious sources    Neuro/ pain/ sedation:  # Sedation  - propofol gtt  # Acute pain  - Multi-modal pain control effective (scheduled tylenol, oxycodone PRN, dilaudid PRN, gabapentin TID, robaxin PRN)  - Nerve block delayed until amputation day, pending scheduling (likely 10/16)     #Facial droop, left lacunar infarct  #Punctate lesions of cerebellum   - high dose heparin   - Follow-up with stroke neurology 4-6 weeks after discharge  - Neurocrit ok with anticoagulation  (now signed off)     # Delirium  # Mixed metabolic encephalopathy   # Sedation, RASS goal 0 to -1  - Monitor neurological status. Delirium preventions and precautions  - Melatonin q6pm and seroquel for sleep aid  - Atarax PRN for anxiety      Pulmonary care:   # Acute hypoxic respiratory failure  # Extubated 10/3  # Re-intubation 10/13  - Mechanical ventilation  / FiO2 50% / RR 14 / PEEP 5      Cardiovascular:    # Contained AAA rupture s/p open repair 9/25  # Acute critical limb ischaemia of LLE  # Hx of HTN  # Tachycardia    - ECG with sinus tachycardia, Troponins up to 319 on initial labs, trend troponins  - Hgb 9.2 on recheck this evening  - Continue to monitor hemodynamic status; Goal MAP >65, SBP <160  - Ischaemic LLE, L AKA delayed until Tuesday 10/17  - LLE attempted thrombectomy, wound vac in place.   - High dose heparin  - Continue Lipitor 10 mg, metoprolol 12.5 BID  - ADDIS on 10/9    GI care:   # s/p open AAA repair, wound vac device in place  # Post-operative melena, resolved  # Rectal ischemia, concern for, resolved  - Strict NPO  - NGT to LIS  - BR with senna BID and miralax daily  - TPN for nutrition  - Aspiration event on 10/9 at time of ADDIS     Fluids/ Electrolytes/ Nutrition:   # Protein calorie deficit malnutrition  - No IVF  - on HD, last cycle 10/13  - Nutrition consulted. Appreciate recs    Renal/ Fluid Balance:   # Acute kidney injury  # Oliguria  # Haemodialysis  - Patient on iHD, last run 10/13  - Nephrology following  - Strict intake and output    Endocrine:    # Stress hyperglycaemia  - Sliding scale for diabetes management  - Insulin aspart q4hrs  - q6h BG checks    ID/ Antibiotics:  # oropharyngeal candidiasis  # Likely aspiration event during ADDIS  - Nystatin suspension    Heme:     # Acute blood loss anaemia  - Hemoglobin stable. Today 9.3  - INR 1.20  - Heparin gtt    MSK:  # Weakness and deconditioning of critical illness  # Left lower limb ischemia   - Passive ROM at bedside  with RN  Marisol WINSLOW likely Tuesday 10/17; planning for block prior to surgery with RAPS  - Physical therapy: up and out of bed  - Podiatry consult for nail clipping     Prophylaxis:  - DVT ppx: heparin gtt and mechanical prophylaxis   - GI ppx: IV Protonix    Lines/ tubes/ drains:  - ETT  - Right internal jugular double lumen  - Left tripple lumen suvbclavian central line   - NGT  - PIVs in RUE  - Abdominal wound vac  - RLE wound vac      Disposition:  - Surgical ICU     Patient seen, findings and plan discussed with vascular fellow, Dr Donald, and surgical ICU staff Dr. Carolyne Diego  [facility] General Surgery PGY2     See [facility] for on-call pager information.      - - - - - - - - - - - - - - - - - - - - - - - - - - - - - - - - - - - - - - - - - - - - - - - - - - - - - - - - - - - - - - - - - - - - - - - -     HISTORY PRESENTING ILLNESS:   Patient completed dialysis run this evening. Upon finishing, RN temporarily disconnected him from O2 source to transfer patient and to connect to O2 tank. Patient saturations dropped to 50s with some improvement when placed on 10L oxymask (previously had been on 3L oxymask). Patient improved to 80s but eventually continued desatting, thus code blue was called.     REVIEW OF SYSTEMS: 10 point ROS neg other than the symptoms noted above in the HPI.    PAST MEDICAL HISTORY:    has a past medical history of Hypertension (2/8/2011) and Macular degeneration.    SURGICAL HISTORY:    has a past surgical history that includes Repair aneurysm abdominal aorta (N/A, 9/24/2023); Sigmoidoscopy flexible (N/A, 9/25/2023); Incision and drainage abdomen washout, combined (N/A, 9/25/2023); Thrombectomy lower extremity (Left, 9/25/2023); Laparotomy exploratory (N/A, 9/25/2023); IR OR Angiogram (9/25/2023); Incision and drainage abdomen washout, combined (N/A, 9/26/2023); Irrigation and debridement abdomen washout, combined (N/A, 9/29/2023); Irrigation and debridement lower extremity, combined  (Left, 9/29/2023); and Irrigation and debridement abdomen washout, combined (N/A, 10/2/2023).    SOCIAL HISTORY:    reports that he has been smoking cigarettes. He has been smoking an average of .5 packs per day. He does not have any smokeless tobacco history on file. He reports current alcohol use. He reports that he does not use drugs.    FAMILY HISTORY: No bleeding/clotting disorders nor problems with anesthesia.     ALLERGIES:      Allergies   Allergen Reactions    Penicillins Swelling     Facial swelling    Has tolerated cefazolin, cefepime       MEDICATIONS:  No current facility-administered medications on file prior to encounter.  amLODIPine-benazepril (LOTREL) 5-20 MG capsule, Take 1 capsule by mouth daily        PHYSICAL EXAMINATION:  Temp:  [97.1  F (36.2  C)-99.8  F (37.7  C)] 97.1  F (36.2  C)  Pulse:  [107-123] 115  Resp:  [16-25] 19  BP: ()/() 106/92  FiO2 (%):  [40 %] 40 %  SpO2:  [88 %-100 %] 100 %  General: intubated, sedated  Neuro: sedated on propofol  HEENT: Normocephalic, atraumatic. NGT in place  Respiratory: intubated, 450 / 50% / 14 / 5   Cardiovascular: tachycardic to 127  Gastrointestinal: Abdomen soft, non-distended. Midline laparotomy incision with wound vac in place. Serosanguinous output in canister.   Genitourinary: Normal genitalia, no scrotal swelling. No aparicio in place.   MSK/Extremities: Ischaemic LLE, wound vac in place, scant dark brown output in canister. No peripheral edema. RLE with dusky fifth, small toe, rest of RLE normal in appearance. Right lower DP and PT palpable. BUE with palpable radial pulses.  Incisions/Skin: As noted above. No rashes or lesions appreciated.      LABS: Reviewed.   Arterial Blood Gases   No lab results found in last 7 days.  Complete Blood Count   Recent Labs   Lab 10/13/23  2034 10/13/23  2027 10/13/23  0320 10/12/23  0323 10/11/23  0312   WBC 24.5*  --  13.4* 12.4* 14.0*   HGB 9.2* 10.9* 6.9* 6.8* 7.4*     --  218 228 192  "    Basic Metabolic Panel  Recent Labs   Lab 10/13/23  2027 10/13/23  1536 10/13/23  1211 10/13/23  0817 10/13/23  0326 10/13/23  0320 10/12/23  0327 10/12/23  0323 10/11/23  0341 10/11/23  0312 10/10/23  0756 10/10/23  0417     --   --   --   --  144  --  143  --  139  --  137   POTASSIUM 3.7  --   --   --   --  4.0  --  4.0  --  3.7  --  4.1   CHLORIDE  --   --   --   --   --  85*  --  87*  --  92*  --  93*   CO2  --   --   --   --   --  39*  --  36*  --  31*  --  27   BUN  --   --   --   --   --  111.0*  --  90.4*  --  65.1*  --  94.4*   CR  --   --   --   --   --  7.56*  --  6.21*  --  4.23*  --  5.28*   * 153* 156* 150*   < > 142*   < > 113*   < > 127*   < > 129*    < > = values in this interval not displayed.     Liver Function Tests  Recent Labs   Lab 10/13/23  2034 10/13/23  0320 10/12/23  0323 10/11/23  0312 10/10/23  0417 10/08/23  0510 10/08/23  0005 10/07/23  0428 10/06/23  2155   AST  --  57* 48* 44 42   < >  --    < >  --    ALT  --  30 28 28 29   < >  --    < >  --    ALKPHOS  --  91 107 124 142*   < >  --    < >  --    BILITOTAL  --  0.4 0.5 0.5 0.5   < >  --    < >  --    ALBUMIN  --  2.6* 2.7* 2.6* 2.6*   < >  --    < >  --    INR 1.18* 1.40*  --   --   --   --  1.13  --  1.19*    < > = values in this interval not displayed.     Pancreatic Enzymes  No lab results found in last 7 days.  Coagulation Profile  Recent Labs   Lab 10/13/23  2034 10/13/23  0320 10/08/23  0005 10/06/23  2155   INR 1.18* 1.40* 1.13 1.19*   *  --  81* 31     Lactate  Invalid input(s): \"LACTATE\"    IMAGING:  No results found for this or any previous visit (from the past 24 hour(s)).    "

## 2023-10-14 NOTE — CODE/RAPID RESPONSE
10/13/23 1900   Call Information   Date of Call 10/13/23   Time of Call 1959   Name of person requesting the team Andrzej COSME   Title of person requesting team RN   RRT Arrival time 2002   Reason for call   Type of RRT Adult   Primary reason for call Respiratory   Respiratory O2sat less than 88% for greater than 5 minutes despite O2   Was patient transferred from the ED, ICU, or PACU within last 24 hours prior to RRT call? Yes   SBAR   Situation Patient desat to 60's. Code called prior to arrival to rapid.   Background Per provider note: Admitted to the SICU on 9/24/2023 s/p open AAA repair. Patient has a history of HTN and previous TIA. He presented to the ED on 9/24 with right sided abdominal pain and was found to have a contained, ruptured AAA on CT. 30 min super-celiac clamp time. Prolonged intra-renal clamp. 9/25 s/p flex sig showing rectal ischemia, abdominal washout showing a hemostatic AAA graft and no evidence of transmural colonic or small bowel ischemia, and an unsuccessful LLE embolectomy. 9/26 s/p repeat abdominal washout, retroperitoneal closure, LLE wound washout. 9/29 s/p repeat abd washout, bowel appeared well perfused w/o notable ischemia. OR 10/2 for abdominal fascia closure. Extubated 10/4. Unequal pupils and R facial droop, MRI brain showed multiple small infarcts, likely embolic.   Notable History/Conditions Cardiac   Assessment Patient with low saturation on oxiplus mask. Patient not breathing effectively.   Patient Outcome   Patient Outcome Code blue called  (patient transferred from dialysis to 4E 14)   RRT Team   Date Attending Physician notified 10/13/23   Physician(s) Gisel Johnson PA-C   Lead RN Daniel Heath

## 2023-10-14 NOTE — PROGRESS NOTES
HEMODIALYSIS TREATMENT NOTE    Date: 10/13/2023  Time: 7:36 PM    Data:  Pre Wt:     Desired Wt:   kg   Post Wt:  1.5KG  Weight change:   kg  Ultrafiltration - Post Run Net Total Removed (mL): 1500 mL  Vascular Access Status: patent  Dialyzer Rinse: Light  Total Blood Volume Processed: 73.5 L Liters  Total Dialysis (Treatment) Time: 3.5 Hours    Lab:   No    Interventions:  TX completed in Dialysis TX room. When Dialysis nurse went to start  HD TX in ICU; Dialysis nurse  was told pt. Is  getting ready to move to . ICU PCN told  Dialysis nurse to take pt  to dialysis unit for Tx;  because the pt.  Is stable and the ICU room needed to be use for another pt. Also the pt.'s new room in  was not clean.  30 minutes before end of Tx,  low BP noted, UF turned off, BP stabilized until the end of Tx. 15 min before the end of the Tx, wife was communicating with pt. (pt responded.).   Pt. On 3L Oxymask throughout the Tx.    Rapid respond called twice, due to pt's decreasing  Sats  to mid 50s, O2 increased to  80S when O2 was increased to 10L on OXYmask.After Sats increased to 80s, it then  decreased to 60s, code blue called per RRT team.  When RRT and code blue team arrived unit nurses stood by to support them. Code team, immediately intubated pt. . Pt. Was later transferred to the ICU when he was stable. On call nephrology DR Patel and unit manager were informed. For further information see code team notes    Assessment:  Low SBP noted. Schedule Midodrine given,     Plan:   Per renal

## 2023-10-14 NOTE — PROGRESS NOTES
SURGICAL ICU PROGRESS NOTE  10/14/2023        Date of Service (when I saw the patient): 10/14/2023    ASSESSMENT:  Alfredo Burnham is a 68 year old male who was admitted to the SICU on 9/24/2023 s/p open AAA repair. Patient has a history of HTN and previous TIA. He presented to the ED on 9/24 with right sided abdominal pain and was found to have a contained, ruptured AAA on CT. 30 min super-celiac clamp time. Prolonged intra-renal clamp. 9/25 s/p flex sig showing rectal ischemia, abdominal washout showing a hemostatic AAA graft and no evidence of transmural colonic or small bowel ischemia, and an unsuccessful LLE embolectomy. 9/26 s/p repeat abdominal washout, retroperitoneal closure, LLE wound washout. 9/29 s/p repeat abd washout, bowel appeared well perfused w/o notable ischemia. OR 10/2 for abdominal fascia closure. Extubated 10/4. Unequal pupils and R facial droop, MRI brain showed multiple small infarcts, likely embolic. Patient was transferred out of the surgical ICU on 10/12. On 10/13, patient completed dialysis run and suddenly started experiencing desaturation. Patient was emergently intubated for Acute Hypoxic Respiratory Failure in dialysis unit and transferred to SICU for further cares.       CHANGES and MAJOR THINGS TODAY:   - Recheck CBC at 3pm  - Get sputum culture and start cefepime  - ADDIS Monday  - no crrt or dialysis today, monitor per nephroloogy  - Rechceck afternoon hgb   - Discussed plan with vascular-- will plan to wait on CTA unless pt becomes hemodynamically unstable, there is not current concern for IVC compression. Will continue heparin drip as pulmonary embolisms must be treated.   - Consult GI for peripancreatic collection     Neuro/ pain/ sedation:  # Sedation  - propofol gtt, wean as tolerated  # Acute pain  - Multi-modal pain control effective (scheduled tylenol, oxycodone PRN, dilaudid PRN, gabapentin TID, robaxin PRN)  - Nerve block delayed until amputation day, pending  scheduling (likely 10/17)     #Facial droop, left lacunar infarct  #Punctate lesions of cerebellum   - continue high dose heparin   - Follow-up with stroke neurology 4-6 weeks after discharge  - Neurocrit ok with anticoagulation (now signed off)  - ADDIS Monday      # Delirium  # Mixed metabolic encephalopathy   # Sedation, RASS goal 0 to -1  - Monitor neurological status. Delirium preventions and precautions  - Melatonin q6pm and seroquel for sleep aid  - Atarax PRN for anxiety      Pulmonary care:   # Acute hypoxic respiratory failure  # Extubated 10/3  # Re-intubation 10/13  # Pulmonary embolism   Satting well overnight, %.   - Mechanical ventilation  / FiO2 50% / RR 14 / PEEP 5  - Work on weaning ventilation as tolerated, will keep ventilated today     Cardiovascular:    # Contained AAA rupture s/p open repair 9/25  # Acute critical limb ischaemia of LLE  # Hx of HTN  # Tachycardia  # L iliacus hematoma, possibly compressing IVC  # Peripancreatic hematoma   ECG with sinus tachycardia, Troponins up to 319 on initial labs  - Hgb 7.1, recheck this morning, transfuse if needed  - Continue to monitor hemodynamic status; Goal MAP >65, SBP <160  - Ischaemic LLE, L AKA delayed until Tuesday 10/17  - LLE attempted thrombectomy, wound vac in place.   - High dose heparin for PE  - Continue Lipitor 10 mg, metoprolol 12.5 BID  - No CTA today, can consider if he becomes unstable      GI care:   # s/p open AAA repair, wound vac device in place  # Post-operative melena, resolved  # Rectal ischemia, concern for, resolved  Continued high NG output, 1250 yesterday and 800 since midnight.  - Strict NPO  - NGT to LIS  - BR with senna BID and miralax daily  - TPN for nutrition  - Aspiration event on 10/9 at time of ADDIS  - Monitor electrolytes  - GI consult regarding peripancreatic collection      Renal/ Fluids/ Electrolytes:   # Protein calorie deficit malnutrition  # Acute kidney injury  # Oliguria  # Haemodialysis  - No  IVF  - no crrt or dialysis today, monitor per nephrology; last iHD 10/13  - Strict intake and output    Endocrine:    # Stress hyperglycaemia  - High SSI for glucose control  - q6h BG checks     ID/ Antibiotics:  # oropharyngeal candidiasis  # Likely aspiration event during ADDIS  White count up to 24.5 yesterday. Decreased to 19 this morning. CT chest showing pleural effusions with associated atelectasis and  groundglass opacities and CXR demonstrated increased R basilar hazy opacity.   - Sputum culture   - Start zosyn   - Nystatin suspension     Heme:     # Acute blood loss anaemia  Hgb 7.1 this morning. Will transfuse.  - transfuse unit of pRBCs  - recheck hgb this afternoon  - INR 1.20  - Continue heparin gtt     MSK:  # Weakness and deconditioning of critical illness  # Left lower limb ischemia   - Passive ROM at bedside with RN  Marisol WINSLOW likely Tuesday 10/17; planning for block prior to surgery with RAPS  - Physical therapy: up and out of bed  - Podiatry consult for nail clipping      Prophylaxis:  - DVT ppx: heparin gtt and mechanical prophylaxis   - GI ppx: IV Protonix     Lines/ tubes/ drains:  - ETT  - Right internal jugular double lumen  - Left tripple lumen suvbclavian central line   - NGT  - PIVs in RUE  - Abdominal wound vac  - LLE wound vac        Disposition:  - Surgical ICU     Patient seen, findings and plan discussed with surgical ICU staff, Dr. Brannon.      Jenni Fryrlnd, MS4  Surgical ICU    ====================================  INTERVAL HISTORY:   Since ICU admission, many oral secretions. Troponin has peaked and is declining. Responds occasionally to commands.     ROS unable to be performed due to sedation.     OBJECTIVE:   1. VITAL SIGNS:   Temp:  [97.1  F (36.2  C)-99.8  F (37.7  C)] 98.2  F (36.8  C)  Pulse:  [] 104  Resp:  [12-42] 21  BP: ()/() 111/72  FiO2 (%):  [40 %-80 %] 40 %  SpO2:  [91 %-100 %] 98 %  Vent Mode: CMV/AC  (Continuous Mandatory Ventilation/ Assist  Control)  FiO2 (%): 40 %  Resp Rate (Set): 14 breaths/min  Tidal Volume (Set, mL): 450 mL  PEEP (cm H2O): 5 cmH2O  Resp: 21      2. INTAKE/ OUTPUT:   I/O last 3 completed shifts:  In: 2042.35 [I.V.:606.75; NG/GT:90]  Out: 3450 [Emesis/NG output:1700; Drains:250; Other:1500]    3. PHYSICAL EXAMINATION:  General: intubated, sedated   HEENT: normocephalic,atraumatic  Neuro: sedated on propofol, occasionally opens eyes, moving upper extremities spontaneously   Pulm/Resp: intubated, 450 / 50% / 14 / 5    CV: tachycardia, EKG with multiple pvcs  Abdomen: Abdomen non-distended. Midline laparotomy incision with wound vac in place. Serosanguinous output in canister.    Gu: decreased scrotal swelling. No aparicio in place.    MSK/Extremities: Ischaemic LLE, wound vac in place, scant dark brown output in canister. No peripheral edema. RLE with slightly dusky fifth, small toe, rest of RLE normal in appearance. Right lower DP and PT palpable. BUE with palpable radial pulses.     4. INVESTIGATIONS:   Arterial Blood Gases   Recent Labs   Lab 10/14/23  0111   PH 7.41  7.41   PCO2 41  41   PO2 85  85   HCO3 26  26     Complete Blood Count   Recent Labs   Lab 10/14/23  0133 10/13/23  2130 10/13/23  2034 10/13/23  2027 10/13/23  0320   WBC 19.0* 25.4* 24.5*  --  13.4*   HGB 7.1* 9.3* 9.2* 10.9* 6.9*    244 257  --  218     Basic Metabolic Panel  Recent Labs   Lab 10/14/23  0400 10/14/23  0349 10/13/23  2350 10/13/23  2130 10/13/23  2034 10/13/23  2027 10/13/23  0326 10/13/23  0320   NA  --  137  --  140 138 140  --  144   POTASSIUM  --  4.0  --  4.1 3.8 3.7  --  4.0   CHLORIDE  --  97*  --  102 102  --   --  85*   CO2  --  26  --  24 20*  --   --  39*   BUN  --  59.6*  --  42.1* 39.9*  --   --  111.0*   CR  --  4.19*  --  3.24* 2.93*  --   --  7.56*   * 150* 142* 145* 178* 171*   < > 142*    < > = values in this interval not displayed.     Liver Function Tests  Recent Labs   Lab 10/14/23  0349 10/13/23  0280  10/13/23  2034 10/13/23  0320 10/12/23  0323 10/08/23  0510 10/08/23  0005   AST 66* 89*  --  57* 48*   < >  --    ALT 39 47  --  30 28   < >  --    ALKPHOS 82 115  --  91 107   < >  --    BILITOTAL 0.4 0.5  --  0.4 0.5   < >  --    ALBUMIN 3.1* 3.2*  --  2.6* 2.7*   < >  --    INR  --  1.20* 1.18* 1.40*  --   --  1.13    < > = values in this interval not displayed.     Pancreatic Enzymes  No lab results found in last 7 days.  Coagulation Profile  Recent Labs   Lab 10/13/23  2130 10/13/23  2034 10/13/23  0320 10/08/23  0005   INR 1.20* 1.18* 1.40* 1.13   * 121*  --  81*         5. RADIOLOGY:   Recent Results (from the past 24 hour(s))   XR Chest Port 1 View    Impression    RESIDENT PRELIMINARY INTERPRETATION  Impression:   1. Endotracheal tube tip is in the low thoracic trachea approximately  1.5 cm cephalad to the level of the lissa.  2. Remainder of support devices stable.  3. Increased right basilar hazy opacity, atelectasis versus developing  infection cannot be ruled out.   XR Abdomen Port 1 View    Impression    RESIDENT PRELIMINARY INTERPRETATION  Impression:   1. NG tube tip and sidehole project over the stomach.  2. Nonobstructive bowel gas pattern.  3. Post surgical changes of the abdomen including wound VAC.   CT Chest/Abdomen/Pelvis w Contrast    Impression    RESIDENT PRELIMINARY INTERPRETATION  IMPRESSION:   1.  Enlarged heterogeneously hypoattenuating left illiacus muscle  measuring up to 6.6 cm. Findings may represent developing iliacus  hematoma. Consider CTA abdomen/pelvis to further evaluate for active  bleeding within the hematoma.  2.  Narrow caliber infrahepatic IVC with appearance of compression in  between thrombus secondary to AAA rupture and abdominal aorta;  findings may represent both hypotension and potential compression  secondary to the surrounding hematoma.   3.  Bilateral heterogeneous appearance of the kidneys, left greater  than right, with apparent segmental  devascularization of the  interpolar and superior left kidney.  4.  Hypoattenuation of the rectum extending to the mid transverse  colon, may represent devascularization secondary to acute blood loss  in the setting of AAA rupture. No evidence of pneumatosis intestinalis  or free air in the abdomen.  5.  Diffuse intraperitoneal ascites and mixed attenuating thrombus,  predominantly surrounding the inferior renal aorta and surrounding the  spleen, liver, and paracolic gutters likely related to recent AAA  rupture.  6.  Bilateral pleural effusions with associated atelectasis and  groundglass opacities, likely representing pulmonary edema.  7.  Diffuse anasarca.          [Urgent Result: Suspected left iliac is hematoma measuring up 6.6 cm;  suspected devascularized interpolar and superior left kidney.]    Finding was identified on 10/14/2023 12:09 PM.     Dr. Ada Donald was contacted by Dr. Luis Good at  10/14/2023 12:25 AM and verbalized understanding of the urgent  finding.        =========================================    I saw and evaluated the patient in an independent visit and agree with the student's assessment and plan as written above. I was present at all times for any mentioned procedures.     Clint Braga MD  PGY-1, Neurosurgery  Off-service on SICU

## 2023-10-14 NOTE — PLAN OF CARE
Major Shift Events:  Admitted back to 4E following emergency intubation after HD dialysis treatment. Sedated, not following commands, moves upper extremities spontaneously. Withdraws in BUE and RLE. Pupils unequal and reactive. Sinus tach with multiple PVCs. MAP goal greater than 65, levo off since around 0000. Intubated, CMV settings. Copious amounts of oral secretions, minimal amount inline secretions. Lung sounds clear/diminished. No void, inc of BM. NG in place to LIS, 150-200 out every 2 hours. Wound vacs in place on LLE surgical incision and abdominal incision. Prop @ 25, Heparin @ 1900. TPN @ 45, lipids held while on prop.    Plan: Continue with plan of care. Notify team of any changes.  For vital signs and complete assessments, please see documentation flowsheets.

## 2023-10-14 NOTE — PLAN OF CARE
Neuro:  Drowsy. Spontaneously moves BUE, RLE.Pupils intermittently unequal. R > L. Pain managed with scheduled tylenoll. Follows simple commands. Able to nod to yes/no questions. Baseline blind. Palpable pulse BUE and RLE. No pulse in LLE.  Cardiac: Sinus Tach. HR  100-110s. Map > 65 without intervention. Hgb 7.1 from 9.3. unit PRBCs given x1.  Hgb re check 7.8  Respiratory: CMV 14/450/5/40%. Scans secretions via ETT. Still needing sputum sample  GI: Loose bm x1. NGT to LIS with bilious output. Abdomen distended but not firm. GI consulted today  : Anuric.   IV:  L subclavian, R arm PIV x2  Gtts: Prop @ 20, Heparin gtt at 1900 units/hr, TPN @ 45 ml/hr  Drains: Abdominal and LLE wound vacs with serosanguinous output.  Skin: See flowsheet.    PLAN:  Continue with current plan of care. Notify MD with acute changes. OR planned for 10/17      Goal Outcome Evaluation:      Plan of Care Reviewed With: patient, spouse

## 2023-10-14 NOTE — PLAN OF CARE
Speech Language Therapy Discharge Summary    Reason for therapy discharge:    Change in medical status.    Progress towards therapy goal(s). See goals on Care Plan in [facility] electronic health record for goal details.  Pt intubated    Therapy recommendation(s):    No further therapy is recommended. Please re-consult s/p extubation as appropriate.

## 2023-10-14 NOTE — CODE/RAPID RESPONSE
Rapid Response Team Note    Assessment   In assessment a rapid response was called on Alfredo Burnham due to acute hypoxic respiratory failure.     Plan   -  CODE BLUE called and patient intubated  -  The  SICU  primary team was  at bedside .  -  Disposition: The patient will be transferred to the ICU.  -  Reassessment and plan follow-up will be performed by the accepting ICU team      Gisel Johnson PA-C  [facility] RRT [facility] Job Code Contact #[phone]  [facility] Paging/Directory    Hospital Course   Brief Summary of events leading to rapid response:   RRT called for AHRF w/ Sats 60-80s. Per bedside HD RN, patient completed HD then developed AHRF. Was placed on 10L FM w/o improvement in O2 sats. CODE BLUE called and patient intubated. Will be transferred to SICU.     Admission Diagnosis:   Hypertension, unspecified type [I10]  Infrarenal abdominal aortic aneurysm (AAA) without rupture (H24) [I71.43]  Infrarenal abdominal aortic aneurysm, ruptured (H) [I71.33]    Physical Exam   Temp: 99.3  F (37.4  C) Temp  Min: 98.1  F (36.7  C)  Max: 99.8  F (37.7  C)  Resp: 19 Resp  Min: 16  Max: 25  SpO2: 99 % SpO2  Min: 88 %  Max: 100 %  Pulse: (!) 122 Pulse  Min: 107  Max: 123    No data recorded  BP: 113/61 Systolic (24hrs), Av , Min:79 , Max:148   Diastolic (24hrs), Av, Min:51, Max:101     I/Os: I/O last 3 completed shifts:  In: 2571.21 [I.V.:506; NG/GT:210; IV Piggyback:500]  Out: 1650 [Emesis/NG output:1300; Drains:350]     Exam:   General: chronically ill appearing  Mental Status: altered level of consciousness based on does not respond to voice or noxious stimuli .  CV: Tachycardia   Resp: Breathing labored on FM. Coarse breath sounds bilaterally    Significant Results and Procedures   Lactic Acid:   Recent Labs   Lab Test 10/13/23  0320 10/12/23  0323 10/11/23  0312   LACT 1.4 1.0 0.9     CBC:   Recent Labs   Lab Test 10/13/23  0320 10/12/23  0323 10/11/23  0312   WBC 13.4* 12.4* 14.0*   HGB 6.9* 6.8*  7.4*   HCT 20.8* 20.6* 22.2*    228 192        Sepsis Evaluation   The patient is not known to have an infection.  NO EVIDENCE OF SEPSIS at this time.  Vital sign, physical exam, and lab findings are due to AHRF.

## 2023-10-14 NOTE — PROGRESS NOTES
Vascular Progress Note    Paged regarding patient with desaturation at end of HD.     Per discussion with dialysis nursing team, patient had finished dialysis and nurse was preparing to transfer him, thus he did briefly disconnect patient's O2 (@3L via oxymask) to reconnect to O2 tank when patient's sats dropped to 50s, with improvement to 80s when on 10L oxymask,  however remained in 80s. Desatted again to the 60s despite this, rapid called, then code blue to get anesthesia team to bedside. Decision made to intubate patient.     Per primary nurse, patient was answering questions appropriately prior to this episode. Patient's wife notes he was not as interactive with her during dialysis run as he was earlier in the day.     Patient seen at bedside, intubated on 80% FiO2 PEEP 5,   Tachycardic 110s-120s  SBP 60s/40s, started on NE with improvement to 100s/70s  Abdomen soft, mildly distended, same as prior   Maculopapular rash throughout abdomen, on bilateral thighs  Some purple discoloration right 5th toe.   Palpable RLE DP pulse  Palpable RUE, LUE radial pulses.     Unclear cause of desaturation, although concern for possible aspiration event is high given recent aspiration event Monday of this week. Also has RLL consolidation which is concerning and would be consistent with possible aspiration.    Transferred back to SICU   - CXR, AXR  - stat labs (CBC, BMP, coags, lactic, trop)   - albumin for hypotension  - CT CAP given hypotension, r/o possible other infectious source  - continue hep gtt     Discussed with SICU resident and primary surgeon, Dr. Kandace Mcleod

## 2023-10-14 NOTE — H&P
I was present and supervised the Code Blue that occurred secondary to hypoxic respiratory failure.  He never lost a pulse, was supported with bag/mask/valve ventilation until intubated.  He was stabilized when on the ventilator.    CXR shows some possible mild consolidation particularly of RLL.  Consider empiric antibiotics.  He is debilitated from the AAA repair and has had a stroke (not new).  Also has LLE ischemia.    I concur with the plan as listed by Dr. Talavera.

## 2023-10-14 NOTE — CONSULTS
"SPIRITUAL HEALTH SERVICES Consult Note  [facility] 4E NeuroSurg ICU    On-call visit with pt's spouse Tabby (son also in room) at bedside - pt intubated/sedated. Pt/family are [employer], active members of [facility] [employer] in [place]. Tabby shared regarding trajectory of pt's illness and hospital stay, and the spiritual support they have received in addition to visits from [facility] chaplains:  \"Nelson was anointed by his own  two weeks ago, but I'm wondering if it would be possible for him to receive the anointing again - he just had surgery, and then he took a turn for the worse and was intubated, and this coming Tuesday he has his amputation surgery.\" Tabby also asked if there was any Syn[employer] Mass being celebrated here, that she hasn't been able to attend mass for a while. I let Tabby know I would refer to our   Father Edgardo for sacramental support tomorrow.     Tabby also requested healing touch for Nelson if possible - \"he's received healing hands (healing touch) before and it really helped him.\". I will refer to unit  Eleanor Tapia for follow-up with this request.     Nestor Wright) Palma Sanders M.Div., [facility]  Staff   Pager [phone]      * [facility] remains available 24/7 for emergent requests/referrals, either by having the switchboard page the on-call  or by entering an ASAP/STAT consult in Epic (this will also page the on-call ). Routine Epic consults receive an initial response within 24 hours.*    "

## 2023-10-14 NOTE — PROGRESS NOTES
Vascular Surgery Progress Note  10/14/2023       Subjective:  Desaturation episode during HD last night, requiring reintubation and transfer to SICU. Vent needs now de-escalated, on 40% FiO2, PEEP 5, .  Sedated.     VAC output was 250 mL in the last 24 hours.    Objective:  Temp:  [97.1  F (36.2  C)-99.8  F (37.7  C)] 97.9  F (36.6  C)  Pulse:  [] 101  Resp:  [12-42] 24  BP: ()/() 100/68  FiO2 (%):  [40 %-80 %] 40 %  SpO2:  [91 %-100 %] 100 %    I/O last 3 completed shifts:  In: 2042.35 [I.V.:606.75; NG/GT:90]  Out: 3450 [Emesis/NG output:1700; Drains:250; Other:1500]      Gen: resting comfortably in bed in ICU, sedated, intubated.  CV: RR per DP pulse, off pressors, sinus tach per tele with PACs,   Resp: intubated, on minimal vent settings  Abd: Wound vac in place, holding seal, abdomen nontender to palpation around VAC   Ext: RLE wwp, palpable DP pulse, 5th toe dusky.. LLE cool to touch, dusky. Blistering appreciated from knee to foot. VAC changes M/W/F  Abdominal incision with vac in place holding seal.     Labs:  Recent Labs   Lab 10/14/23  0740 10/14/23  0133 10/13/23  2130   WBC 17.1* 19.0* 25.4*   HGB 7.1* 7.1* 9.3*    173 244       Recent Labs   Lab 10/14/23  0834 10/14/23  0400 10/14/23  0349 10/13/23  2350 10/13/23  2130 10/13/23  2034   NA  --   --  137  --  140 138   POTASSIUM  --   --  4.0  --  4.1 3.8   CHLORIDE  --   --  97*  --  102 102   CO2  --   --  26  --  24 20*   BUN  --   --  59.6*  --  42.1* 39.9*   CR  --   --  4.19*  --  3.24* 2.93*   * 136* 150*   < > 145* 178*   OMAR  --   --  8.2*  --  8.4* 8.3*   MAG  --   --  1.8  --  1.9 1.7   PHOS  --   --  6.2*  --  5.1* 3.9    < > = values in this interval not displayed.      Imaging  Narrative & Impression   EXAMINATION: CT CHEST/ABDOMEN/PELVIS W CONTRAST, 10/13/2023 11:33 PM     COMPARISON STUDY: Abdominal radiograph 10/10/2023     INDICATION: new desat episode, hypotension, eval ? infectious source      TECHNIQUE: CT scan of the chest, abdomen and pelvis was performed on  multidetector CT scanner using volumetric acquisition technique and  images were reconstructed in multiple planes with variable thickness  and reviewed on dedicated workstations.      CONTRAST: iopamidol (ISOVUE-370) solution 104 m. Without oral  contrast.     CT scan radiation dose is optimized to minimum requisite dose using  automated dose modulation techniques.     Findings:      Chest:  Lungs: Bilateral small-to-moderate pleural effusions with associated  atelectasis and ground glass opacities, left greater than right.  Basilar predominant interlobular septal thickening. No pneumothorax.     Mediastinum: Heart size is within normal limits. No pericardial  effusion. Aorta and main pulmonary artery caliber are within normal  limits No mediastinal lymphadenopathy. Small volume of air within the  left brachiocephalic vein (series 5, image 33) just prior to entering  the superior vena cava, likely iatrogenic secondary left upper  extremity central venous catheter. Enteric tube coursing the esophagus  terminating in the stomach lumen, otherwise the esophagus is  unremarkable.         Abdomen/Pelvis:    Liver: No mass. Mild intrahepatic biliary dilatation. Hypodensity at  the lateral aspect of the right hepatic lobe measuring up to 11 mm  (series 5, image 103).      Gallbladder/CBD: Mildly distended gallbladder with layering sludge.  Common bile duct within normal limits for patient's age.     Pancreas: Pancreas appears to be normal enhancing with peripancreatic  hypoattenuating collection this primarily hypoattenuating but with  areas of increased hyperattenuation. This suggests peripancreatic  fluid collection with hemorrhage.     Spleen: Surrounded by hypoattenuating fluid, otherwise unremarkable.     Adrenal glands: Normal.     Kidneys/ureters/bladder: Heterogeneous hypoattenuation of the kidneys  bilaterally with wedgelike confluent  hypoattenuation in the interpolar  and superior pole of the left kidney (series series 5, images  127-156). No obstructing renal stone, hydronephrosis, renal masses.      Genitourinary/reproductive tract: Normal bladder. Reproductive organs  are within normal limits.     Gastrointestinal: Hypoattenuating of the large bowel with wall  thickening starting from the rectum and extending to the mid  transverse colon with surrounding fat stranding (160-257). Colonic  diverticulosis. Normal caliber small bowel. Appendix not seen. Stomach  and esophagus are unremarkable.     Vasculature: Postsurgical changes of ruptured AAA open repair with  mixed attenuation clot surrounding the postsurgical repairs. Narrow  lumen inferior vena cava compressed by hematoma at the level of the  kidneys. Small hematoma at the aortic bifurcation. Patent hepatic  portal vein.     Retroperitoneum/peritoneum/mesentery: Ascites throughout the  peritoneal cavity surrounding the liver or spleen, infrarenal aorta  and layering in the pelvis. 2 cm hematoma at the aortic bifurcation. 2  cm hematoma just below the level of the renal veins.     Bones: No acute or aggressive appearing osseous bodies. Ultimately  degenerative changes of the spine.     Soft tissues: Enlarged left iliacus muscle with hypodense  heterogeneous appearance measuring up to 6.6 cm (series 5, image 224).  Diffuse anasarca.                                                                      IMPRESSION:   1.  Enlarged heterogeneously hypoattenuating left illiacus muscle  measuring up to 6.6 cm. Findings may represent developing iliacus  hematoma. Consider CTA abdomen/pelvis to further evaluate for active  bleeding within the hematoma.  2.  Narrow caliber infrahepatic IVC with appearance of compression in  between thrombus secondary to AAA rupture and abdominal aorta;  findings may represent both hypotension and potential compression  secondary to the surrounding hematoma.   3.   Bilateral, left greater than right, wedge-shaped hypodensities  suggesting infarcts.   4.  Edematous left colon. There appears to be mucosal enhancement. No  evidence of pneumatosis intestinalis or free air in the abdomen.  5.  Pancreas appears perfused but there is peripancreatic fluid with  hemorrhage. No pseudoaneurysm or extravasation identified.   6.  Diffuse intraperitoneal ascites and mixed attenuating hematoma,  along the right inferior renal aorta and aortic bifurcation, likely  related to recent AAA rupture and subsequent repair.  7.  Bilateral pleural effusions with associated atelectasis and  groundglass opacities, likely representing pulmonary edema.  8.  Diffuse anasarca.              [Urgent Result: Suspected left iliac is hematoma measuring up 6.6 cm;  suspected devascularized interpolar and superior left kidney.]     Finding was identified on 10/14/2023 12:09 PM.        Assessment/Plan:   68 year old male with PMH of HTN and previous TIA who presented with R sided abdominal pain found to have contained ruptured AAA, now s/p open AAA repair 9/24 into 9/25am with 30 min supraceliac clamp time, prolonged inter-renal clamp time, temporary abdominal closure. He did have bloody stool postop with flex sig 9/25 demonstrating ischemic mucosal changes of the rectum, however on repeated abdominal looks he has had no evidence of transmural necrosis of the small or large intestine, or the rectum. On 9/29 he was started on CRRT given ongoing low UOP and rising Cr and failure of lasix challenge. Now on iHD. Otherwise hemodynamically continues to do well off pressors. Ischemic LLE unchanged, note DVT in this leg, this is to be expected given ischemia and absent inflow. We will continue to monitor LLE given it is not currently making patient systemically ill and there is no indication for urgent intervention. S/p closure of abdominal fascia and subcutaneous tissue left open with wound VAC applied 10/2/23. Left AKA  on hold at this time, as leukocytosis improves and supplemental oxygen requirement downtrend. Continues to have large NGT output, high risk for aspiration and further worsening infectious process. Desaturation evening 10/13 requiring reintubation, transfer back to SICU. CT completed which demonstrated peripancreatic fluid collection with concern for hemorrhage, as well as L iliacus muscle hematoma. Hemoglobin stable this am at 7.1 from prior, low concern for active bleeding. GI consult today for consideration of drainage of fluid collection.    Neuro:   - sedation per SICU  - appreciate sedation vacation and assessment neuro status  - Stroke workup negative -- MRI with multiple small infarcts, follow-up with neurology 4-6 weeks after discharge, ok with a/c.   - ADDIS for workup needs to be done, nothing noted on TTE.  CV:   - Off pressors, midodrine 20mg daily prn with HD runs   - Goal SBP <160, MAP >65  - labetalol prn for SBP >160  - continue statin  - PE+, venous duplex with nonocclusive to occlusive thrombus of the left GSV from the level of the knee to the groin and nonocclusive thrombus of the left distal femoral vein and popliteal veins, increased in extent compared to 9/30/2023.   - Continue with heparin gtt.  - ADDIS today given embolic multiple small infarcts on stroke ochoa.   Resp:   - Desaturation episode, reintubated overnight  - vent settings per SICU, wean as tolerated.   GI:   - pancreatitis with peripancreatic fluid collection on CT, GI consult today for consideration of drainage of fluid collection.  - Do not expect this anterior location of pancreatic fluid to be related to surgical procedure as we were in the RP and did see inferior/posterior aspect of pancreas, but would expect if it were to be surgery related it would be more posterior.   - Fluid collection is not surrounding aortic graft, this is reassuring.  - NPO, NGT in place to LIS   - Continue TPN   - having Bms, still having high NG  output  FEN:   - electrolyte replacement prn   Renal:   - Appreciate nephrology recommendations  - continue iHD  Heme:   -Hgb 7.1 today - 1u pRBC  -DVT as above, PE   -Continue hep gtt  - PT/OT ROM as able.  ID:   - known aspiration event 10/9   - monitor signs of pneumonia  - nystatin swish for oral candida  -leukocytosis post reintubation, however this is improving with each re-check  MSK:   - L AKA on schedule for next week Tuesday, nerve block with RAPS team prior to case  Misc:   - VAC changes with vascular surgery M/W/F  - on hep gtt no dvt ppx needed  - SICU      Discussed  patient with , vascular surgery attending     Will Donald MD   Vascular Surgery PGY3

## 2023-10-14 NOTE — ANESTHESIA PROCEDURE NOTES
Airway       Patient location during procedure: Floor       Procedure Start/Stop Times: 10/13/2023 8:14 PM  Staff -        Anesthesiologist:  Blu Smart MD       Resident/Fellow: Crow Collazo MD       Performed By: resident  Consent for Airway        Urgency: elective  Report Obtained from Primary Care Team       History regarding most recent potassium obtained: Yes       History regarding presence/absence of renal failure obtained:Yes       History regarding stroke/CVA obtained:Yes       History regarding presence/absence of NM disorder: YesIndications and Patient Condition       Indications for airway management: airway protection, altered level of consciousness and respiratory insufficiency       Mallampati: Not Assessed     Induction type:intravenous       Mask difficulty assessment: 1 - vent by mask    Final Airway Details       Final airway type: endotracheal airway       Successful airway: ETT - single  Endotracheal Airway Details        ETT size (mm): 8.0       Cuffed: yes       Successful intubation technique: video laryngoscopy       VL Blade Size: MAC 4       Grade View of Cords: 1       Adjucts: stylet       Position: Center       Measured from: lips       Secured at (cm): 22       Bite block used: None    Post intubation assessment        ETT secured, Vent settings by primary/ICU team, Primary/ICU team to review CXR, Sedation to be ordered by primary/ICU team and No apparent complications       Placement verified by: capnometry, equal breath sounds and chest rise        Number of attempts at approach: 1       Secured with: commercial tube carlos       Ease of procedure: easy       Dentition: Intact and Unchanged    Medication(s) Administered   Medication Administration Time: 10/13/2023 8:14 PM    Additional Comments       Code blue to dialysis center, reportedly had AHRF at end of dialysis run.

## 2023-10-14 NOTE — PROGRESS NOTES
Nephrology Progress Note  10/14/2023       Alfredo Burnham is a 68 yom with complex hx of TIA, HTN, blindness who presented to [facility] 9/24 with severe abdominal pain radiating to flank and back, CT revealed AAA with a contained rupture.  Taken to OR for aortobiiliac bypass and resection of ruptured pararenal aneurysm.   Post op course complicated by hypotension requiring pressors and metabolic acidosis.  Baseline Cr 1.0 on presentation but on the rise to >5 at time of renal consult for MAYA management and possible RRT.       Interval History :   Mr Burnham had CRRT stopped 10/4, stable on iHD since until 10/13/2023.  Appears that he overall tolerated iHD treatment 10/13/2023 but after treatment completed while preparing for transfer off HD unit he had a sudden onset of severe desat requiring intubation, hypotension requiring norepi and was transferred to ICU.      Significant oral secretions noted. NG in place with LIS and significant fluid removal qHr (75-100cc's per nursing report)    He is now off norepi. Vent FiO2 40 Peep 5. K 4 this AM.     Assessment & Recommendations:   MAYA-Baseline Cr 1.0, ordered UA.  CT showed benign cyst but otherwise normal kidneys.  Cr on the rise since surgery, did receive contrast for CTA.  Urine microscopy showed granular casts suggesting ATN which will recover with time and stabilizing hemodynamics.  Started on CRRT 9/30 for volume and clearance with minimal UOP and rising Cr.                  -Started CRRT 9/30 for volume and clearance, stopped 10/4 and now running iHD PRN and watching for recovery.       -Hold on RRT today. Resp, hemodynamics, electrolytes okay so no need to transition to CRRT. Next iHD pending clinical course tentatively planned for 10/16/2023. Will eval for need to transition to CRRT daily.      -Line is RIJ temp line from 10/6                 -Dialysis consent signed and scanned into media tab.      Volume- Follow up on Monday to determine fluid pull with  "iHD if stable     Electrolytes-K 4.0, bicarb 26, Na 137.        BMD-Ca 8.2, Mg 1.8.  Phos 6.2,      Anemia-Hgb 7.1, ~14 on presentation, acute management per team.       Nutrition-TPN started 10/12, recommend no Phos in TPN      Time spent: 40 minutes on this date of encounter for chart review, physical exam, medical decision making and co-ordination of care.     Review of Systems:   I reviewed the following systems:  ROS not done due to lethargy    Physical Exam:   I/O last 3 completed shifts:  In: 2042.35 [I.V.:606.75; NG/GT:90]  Out: 3450 [Emesis/NG output:1700; Drains:250; Other:1500]   /77   Pulse 107   Temp 99.3  F (37.4  C)   Resp 21   Ht 1.727 m (5' 8\")   Wt 76.7 kg (169 lb 1.5 oz)   SpO2 97%   BMI 25.71 kg/m       GENERAL APPEARANCE: Extubated, lethargic, in no distress.   EYES: No scleral icterus  Pulmonary: On vent 40%/8 of PEEP  CV: Regular rhythm, normal rate   - Edema +1-2 generalized, + scrotal edema.    GI: distended, nontender  MS: no evidence of inflammation in joints, no muscle tenderness  : No Lu  SKIN: no rash, warm, dry  NEURO: No focal deficits.         Labs:   All labs reviewed by me  Electrolytes/Renal -   Recent Labs   Lab Test 10/14/23  0834 10/14/23  0400 10/14/23  0349 10/13/23  2350 10/13/23  2130 10/13/23  2034   NA  --   --  137  --  140 138   POTASSIUM  --   --  4.0  --  4.1 3.8   CHLORIDE  --   --  97*  --  102 102   CO2  --   --  26  --  24 20*   BUN  --   --  59.6*  --  42.1* 39.9*   CR  --   --  4.19*  --  3.24* 2.93*   * 136* 150*   < > 145* 178*   OMAR  --   --  8.2*  --  8.4* 8.3*   MAG  --   --  1.8  --  1.9 1.7   PHOS  --   --  6.2*  --  5.1* 3.9    < > = values in this interval not displayed.       CBC -   Recent Labs   Lab Test 10/14/23  0740 10/14/23  0133 10/13/23  2130   WBC 17.1* 19.0* 25.4*   HGB 7.1* 7.1* 9.3*    173 244       LFTs -   Recent Labs   Lab Test 10/14/23  0349 10/13/23  2130 10/13/23  0320   ALKPHOS 82 115 91   BILITOTAL " 0.4 0.5 0.4   ALT 39 47 30   AST 66* 89* 57*   PROTTOTAL 5.8* 6.7 5.5*   ALBUMIN 3.1* 3.2* 2.6*       Iron Panel - No lab results found.        Current Medications:   acetaminophen  975 mg Oral or Feeding Tube Q6H    atorvastatin  10 mg Oral or Feeding Tube QPM    ceFEPIme  1 g Intravenous Q24H    chlorhexidine  15 mL Mouth/Throat Q12H    famotidine  20 mg Intravenous Q12H    gabapentin  300 mg Oral or Feeding Tube At Bedtime    insulin aspart  1-12 Units Subcutaneous Q4H    [Held by provider] lipids plant base  250 mL Intravenous Q24H    magnesium sulfate  2 g Intravenous Once    melatonin  5 mg Oral or Feeding Tube QPM    nystatin  500,000 Units Oral 4x Daily    pantoprazole  40 mg Per Feeding Tube QAM AC    Or    pantoprazole  40 mg Intravenous QAM AC    polyethylene glycol  17 g Oral or Feeding Tube Daily    QUEtiapine  12.5-25 mg Oral or Feeding Tube At Bedtime    senna-docusate  1 tablet Oral or Feeding Tube BID    sodium chloride (PF)  3 mL Intravenous Q8H      dextrose      dextrose      dextrose      heparin 1,900 Units/hr (10/14/23 0700)    propofol 25 mcg/kg/min (10/14/23 0815)    And    - MEDICATION INSTRUCTIONS -      norepinephrine Stopped (10/14/23 0000)    parenteral nutrition - ADULT compounded formula 45 mL/hr at 10/14/23 0700

## 2023-10-14 NOTE — CONSULTS
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 He underwent open AAA repair on 9/24 into 9/25am with 30 min supraceliac clamp time, prolonged inter-renal clamp time, temporary abdominal closure with multiple RTOR with abdominal washout and final abdominal closure of the fascia and VAC placement 10/2/2023.  Hospital course has been complicated by ischemic colitis (no transmural necrosis), renal failure on CRRT and then iHD, LLE ischemia and embolic CNS infarcts.     He was extubated on 10/4 and transferred to the floor on 10/12.  On 10/13, he developed respiratory failure with oxygen saturation to 60s status post intubation and transferred back to ICU.     Panc bili service was consulted today given CT findings of peripancreatic fluid with some hyperenhancement concerning for hemorrhage without pseudoaneurysm or extravasation on 10/13.        #.Peripancreatic fluid collection  #.Hyperattenuation of the peripancreatic fluid collection concerning for hemorrhage   On CT 10/13, the pancreatic parenchyma appears to be normal. Lipase 10/14 is normal and no lipase was checked previously after AAA repair. While it is possible that he may have developed ischemic pancreatitis after the AAA repair and the current fluid collection represents peripancreatic fat necrosis, it is also possible that the fluid collection could be a reactive process due to his abdominal surgeries.     If the fluid collection were due to peripancreatic fat necrosis, there is no indication for endoscopic intervention to the fluid collection in the absence of evidence  for infected fluid collection (no gas bubbles) or bowel obstruction.     Regarding the possible bleeding into the peripancreatic fluid collection, the hyperenhancement in the fluid collection on CT is mild and no pseudoaneurysm or active extravasation was seen.  Therefore unlikely to be the source of his anemia.  He does have a large left iliac hematoma which could be the source for acute on chronic anemia.  Besides, endoscopic intervention is not the treatment for bleeding into the peripancreatic fluid collection.     Recommendations  - If concern for ongoing intra-abdominal bleeding, consider CTA   - Appreciate supportive care from ICU      Thank you for involving us in this patient's care. Please do not hesitate to contact the GI service with any questions or concerns.     Pt care plan discussed with Dr. Dupree, GI staff physician.      Tameka Arora MD PhD  GI Fellow   [phone]   -------------------------------------------------------------------------------------------------------------------           History of Present Illness:   Alfredo Burnham is a 68 year old male who has had a complicated hospital course since initial presentation of a contained rupture AAA on 9/24/23. He underwent open AAA repair on 9/24 into 9/25am with 30 min supraceliac clamp time, prolonged inter-renal clamp time, temporary abdominal closure with multiple RTOR with abdominal washout and final abdominal closure of the fascia on and VAC placement 10/2/2023.  Hospital course has been complicated by ischemic colitis (no transmural necrosis), renal failure on CRRT and then iHD, LLE ischemia and embolic CNS infarcts.     He was extubated on 10/4 and transferred to the floor on 10/12.  On 10/13, he developed respiratory failure with oxygen saturation to 60s status post intubation and transferred back to ICU.     Thank bili service was consulted today given CT findings of peripancreatic fluid with some hyperenhancement concerning  for hemorrhage without pseudoaneurysm or extravasation on 10/13.             Past Medical History:   Reviewed and edited as appropriate  Past Medical History:   Diagnosis Date    Hypertension 2/8/2011    Macular degeneration             Past Surgical History:   Reviewed and edited as appropriate   Past Surgical History:   Procedure Laterality Date    INCISION AND DRAINAGE ABDOMEN WASHOUT, COMBINED N/A 9/25/2023    Procedure: abdominal washout, combined, repacking,  abthera placement;  Surgeon: Ash Boucher MBBS;  Location: [facility] OR    INCISION AND DRAINAGE ABDOMEN WASHOUT, COMBINED N/A 9/26/2023    Procedure: Abdominal washout, Retroperitoneal closure, washout left lower extremity wound;  Surgeon: Boris Lowe;  Location: [facility] OR    IR OR ANGIOGRAM  9/25/2023    IRRIGATION AND DEBRIDEMENT ABDOMEN WASHOUT, COMBINED N/A 9/29/2023    Procedure: exploration of  ABDOMINAL CAVITY, placement of abthera;  Surgeon: Boris Lowe;  Location: [facility]    IRRIGATION AND DEBRIDEMENT ABDOMEN WASHOUT, COMBINED N/A 10/2/2023    Procedure: Exploratory laparotomy, abominal closure, wound vac change left lower extremity;  Surgeon: Jonathan Fry MD;  Location: [facility]    IRRIGATION AND DEBRIDEMENT LOWER EXTREMITY, COMBINED Left 9/29/2023    Procedure: exploration of left lower extremity, partial closure of left lower extremity, wound vac placement;  Surgeon: Boris Lowe;  Location: [facility]    LAPAROTOMY EXPLORATORY N/A 9/25/2023    Procedure: Laparotomy exploratory;  Surgeon: Roger Shirley MD;  Location: [facility]    REPAIR ANEURYSM ABDOMINAL AORTA N/A 9/24/2023    Procedure: Resection of Ruptureed Abdominal Aneurysm, Aortic biliary bypass with 20 x 10 mm Bifurcated hemagard graft, Temporary Abdominal Closure, Endovascular Balloon Inclusion of Aorta;  Surgeon: Boris Lowe;  Location: [facility]    SIGMOIDOSCOPY FLEXIBLE N/A 9/25/2023    Procedure: Sigmoidoscopy flexible;  Surgeon: Gabino Walker MD;   Location: [facility]    THROMBECTOMY LOWER EXTREMITY Left 9/25/2023    Procedure: SFA, popliteal, and tibial thromboembolectomy and four compartment fasciotomy;  Surgeon: Ash Boucher MBBS;  Location: [facility] OR            Social History:   Reviewed and edited as appropriate  Social History     Socioeconomic History    Marital status:      Spouse name: Not on file    Number of children: Not on file    Years of education: Not on file    Highest education level: Not on file   Occupational History    Not on file   Tobacco Use    Smoking status: Every Day     Packs/day: .5     Types: Cigarettes    Smokeless tobacco: Not on file   Substance and Sexual Activity    Alcohol use: Yes     Comment: 1-2/wk    Drug use: No    Sexual activity: Yes     Partners: Female   Other Topics Concern    Parent/sibling w/ CABG, MI or angioplasty before 65F 55M? Yes   Social History Narrative    Not on file     Social Determinants of Health     Financial Resource Strain: Not on file   Food Insecurity: Not on file   Transportation Needs: Not on file   Physical Activity: Not on file   Stress: Not on file   Social Connections: Not on file   Interpersonal Safety: Not on file   Housing Stability: Not on file            Family History:   Reviewed and edited as appropriate  Family History   Problem Relation Age of Onset    Unknown/Adopted Mother     Heart Disease Mother     Arthritis Mother     Hypertension Brother     Blood Disease Sister     Psychotic Disorder Sister       No known history of colorectal cancer, liver disease, or inflammatory bowel disease.         Allergies:   Reviewed and edited as appropriate     Allergies   Allergen Reactions    Penicillins Swelling     Facial swelling    Has tolerated cefazolin, cefepime            Medications:     Medications Prior to Admission   Medication Sig Dispense Refill Last Dose    amLODIPine-benazepril (LOTREL) 5-20 MG capsule Take 1 capsule by mouth daily   Past Week             Review of Systems:  "    A complete 10 point review of systems was performed and is negative except as noted in the HPI           Physical Exam:   /68   Pulse 101   Temp 97.9  F (36.6  C) (Axillary)   Resp 24   Ht 1.727 m (5' 8\")   Wt 76.7 kg (169 lb 1.5 oz)   SpO2 100%   BMI 25.71 kg/m    Wt:   Wt Readings from Last 2 Encounters:   10/13/23 76.7 kg (169 lb 1.5 oz)   02/08/11 75.8 kg (167 lb)      Constitutional: Intubated and sedated  Eyes: Sclera anicteric  Ears/nose/mouth/throat: Moist mucus membranes, hearing intact  Neck: supple  CV: Tachycardic  Respiratory: On mechanical ventilation  Abd: Distended, bowel sounds present  Skin: warm, perfused, no jaundice  Neuro: Sedated  Psych: Sedated  MSK: No defects in upper extremity         Data:   Labs and imaging below were independently reviewed and interpreted    BMP  Recent Labs   Lab 10/14/23  0834 10/14/23  0400 10/14/23  0349 10/13/23  2350 10/13/23  2130 10/13/23  2034 10/13/23  2027 10/13/23  0326 10/13/23  0320   NA  --   --  137  --  140 138 140  --  144   POTASSIUM  --   --  4.0  --  4.1 3.8 3.7  --  4.0   CHLORIDE  --   --  97*  --  102 102  --   --  85*   OMAR  --   --  8.2*  --  8.4* 8.3*  --   --  8.1*   CO2  --   --  26  --  24 20*  --   --  39*   BUN  --   --  59.6*  --  42.1* 39.9*  --   --  111.0*   CR  --   --  4.19*  --  3.24* 2.93*  --   --  7.56*   * 136* 150* 142* 145* 178* 171*   < > 142*    < > = values in this interval not displayed.     CBC  Recent Labs   Lab 10/14/23  0740 10/14/23  0133 10/13/23  2130 10/13/23  2034   WBC 17.1* 19.0* 25.4* 24.5*   RBC 2.25* 2.26* 2.99* 2.99*   HGB 7.1* 7.1* 9.3* 9.2*   HCT 22.2* 22.2* 28.7* 29.2*   MCV 99 98 96 98   MCH 31.6 31.4 31.1 30.8   MCHC 32.0 32.0 32.4 31.5   RDW 17.1* 16.7* 17.2* 17.0*    173 244 257     INR  Recent Labs   Lab 10/13/23  2130 10/13/23  2034 10/13/23  0320 10/08/23  0005   INR 1.20* 1.18* 1.40* 1.13     LFTs  Recent Labs   Lab 10/14/23  0349 10/13/23  2130 10/13/23  0320 " 10/12/23  0323   ALKPHOS 82 115 91 107   AST 66* 89* 57* 48*   ALT 39 47 30 28   BILITOTAL 0.4 0.5 0.4 0.5   PROTTOTAL 5.8* 6.7 5.5* 5.7*   ALBUMIN 3.1* 3.2* 2.6* 2.7*      PANC  Recent Labs   Lab 10/14/23  1017   LIPASE 51       Imaging:  CT 10/13/2023  IMPRESSION:   1.  Enlarged heterogeneously hypoattenuating left illiacus muscle  measuring up to 6.6 cm. Findings may represent developing iliacus  hematoma. Consider CTA abdomen/pelvis to further evaluate for active  bleeding within the hematoma.  2.  Narrow caliber infrahepatic IVC with appearance of compression in  between thrombus secondary to AAA rupture and abdominal aorta;  findings may represent both hypotension and potential compression  secondary to the surrounding hematoma.   3.  Bilateral, left greater than right, wedge-shaped hypodensities  suggesting infarcts.   4.  Edematous left colon. There appears to be mucosal enhancement. No  evidence of pneumatosis intestinalis or free air in the abdomen.  5.  Pancreas appears perfused but there is peripancreatic fluid with  hemorrhage. No pseudoaneurysm or extravasation identified.   6.  Diffuse intraperitoneal ascites and mixed attenuating hematoma,  along the right inferior renal aorta and aortic bifurcation, likely  related to recent AAA rupture and subsequent repair.  7.  Bilateral pleural effusions with associated atelectasis and  groundglass opacities, likely representing pulmonary edema.  8.  Diffuse anasarca.      [Urgent Result: Suspected left iliac is hematoma measuring up 6.6 cm;  suspected devascularized interpolar and superior left kidney.]    Endoscopy: NA

## 2023-10-15 LAB
ALBUMIN SERPL BCG-MCNC: 2.5 G/DL (ref 3.5–5.2)
ALP SERPL-CCNC: 98 U/L (ref 40–129)
ALT SERPL W P-5'-P-CCNC: 34 U/L (ref 0–70)
ANION GAP SERPL CALCULATED.3IONS-SCNC: 19 MMOL/L (ref 7–15)
APTT PPP: >240 SECONDS (ref 22–38)
AST SERPL W P-5'-P-CCNC: 65 U/L (ref 0–45)
BASE EXCESS BLDV CALC-SCNC: -1.8 MMOL/L (ref -7.7–1.9)
BILIRUB DIRECT SERPL-MCNC: 0.24 MG/DL (ref 0–0.3)
BILIRUB SERPL-MCNC: 0.4 MG/DL
BLD PROD TYP BPU: NORMAL
BLOOD COMPONENT TYPE: NORMAL
BUN SERPL-MCNC: 95.8 MG/DL (ref 8–23)
CA-I BLD-MCNC: 4.4 MG/DL (ref 4.4–5.2)
CALCIUM SERPL-MCNC: 8.6 MG/DL (ref 8.8–10.2)
CHLORIDE SERPL-SCNC: 96 MMOL/L (ref 98–107)
CODING SYSTEM: NORMAL
CREAT SERPL-MCNC: 5.77 MG/DL (ref 0.67–1.17)
CROSSMATCH: NORMAL
DEPRECATED HCO3 PLAS-SCNC: 21 MMOL/L (ref 22–29)
EGFRCR SERPLBLD CKD-EPI 2021: 10 ML/MIN/1.73M2
ERYTHROCYTE [DISTWIDTH] IN BLOOD BY AUTOMATED COUNT: 17.1 % (ref 10–15)
FIBRINOGEN PPP-MCNC: 659 MG/DL (ref 170–490)
GLUCOSE BLDC GLUCOMTR-MCNC: 114 MG/DL (ref 70–99)
GLUCOSE BLDC GLUCOMTR-MCNC: 122 MG/DL (ref 70–99)
GLUCOSE BLDC GLUCOMTR-MCNC: 129 MG/DL (ref 70–99)
GLUCOSE BLDC GLUCOMTR-MCNC: 133 MG/DL (ref 70–99)
GLUCOSE BLDC GLUCOMTR-MCNC: 137 MG/DL (ref 70–99)
GLUCOSE BLDC GLUCOMTR-MCNC: 143 MG/DL (ref 70–99)
GLUCOSE SERPL-MCNC: 123 MG/DL (ref 70–99)
HCO3 BLDV-SCNC: 23 MMOL/L (ref 21–28)
HCT VFR BLD AUTO: 22.5 % (ref 40–53)
HGB BLD-MCNC: 7 G/DL (ref 13.3–17.7)
HGB BLD-MCNC: 7.3 G/DL (ref 13.3–17.7)
HGB BLD-MCNC: 7.8 G/DL (ref 13.3–17.7)
INR PPP: 1.32 (ref 0.85–1.15)
ISSUE DATE AND TIME: NORMAL
LACTATE SERPL-SCNC: 1.3 MMOL/L (ref 0.7–2)
MAGNESIUM SERPL-MCNC: 2.6 MG/DL (ref 1.7–2.3)
MCH RBC QN AUTO: 31.1 PG (ref 26.5–33)
MCHC RBC AUTO-ENTMCNC: 32.4 G/DL (ref 31.5–36.5)
MCV RBC AUTO: 96 FL (ref 78–100)
O2/TOTAL GAS SETTING VFR VENT: 40 %
OXYHGB MFR BLDV: 81 % (ref 70–75)
PCO2 BLDV: 37 MM HG (ref 40–50)
PH BLDV: 7.4 [PH] (ref 7.32–7.43)
PHOSPHATE SERPL-MCNC: 7.1 MG/DL (ref 2.5–4.5)
PLATELET # BLD AUTO: 142 10E3/UL (ref 150–450)
PO2 BLDV: 49 MM HG (ref 25–47)
POTASSIUM SERPL-SCNC: 4 MMOL/L (ref 3.4–5.3)
PROT SERPL-MCNC: 5.5 G/DL (ref 6.4–8.3)
RBC # BLD AUTO: 2.35 10E6/UL (ref 4.4–5.9)
SODIUM SERPL-SCNC: 136 MMOL/L (ref 135–145)
UFH PPP CHRO-ACNC: 0.67 IU/ML
UNIT ABO/RH: NORMAL
UNIT NUMBER: NORMAL
UNIT STATUS: NORMAL
UNIT TYPE ISBT: 5100
WBC # BLD AUTO: 13.8 10E3/UL (ref 4–11)

## 2023-10-15 PROCEDURE — 250N000013 HC RX MED GY IP 250 OP 250 PS 637

## 2023-10-15 PROCEDURE — 85384 FIBRINOGEN ACTIVITY: CPT | Performed by: STUDENT IN AN ORGANIZED HEALTH CARE EDUCATION/TRAINING PROGRAM

## 2023-10-15 PROCEDURE — B4185 PARENTERAL SOL 10 GM LIPIDS: HCPCS | Mod: JZ | Performed by: SURGERY

## 2023-10-15 PROCEDURE — 85041 AUTOMATED RBC COUNT: CPT

## 2023-10-15 PROCEDURE — 85018 HEMOGLOBIN: CPT

## 2023-10-15 PROCEDURE — 250N000013 HC RX MED GY IP 250 OP 250 PS 637: Performed by: SURGERY

## 2023-10-15 PROCEDURE — 250N000009 HC RX 250

## 2023-10-15 PROCEDURE — 82330 ASSAY OF CALCIUM: CPT | Performed by: PHYSICIAN ASSISTANT

## 2023-10-15 PROCEDURE — 85730 THROMBOPLASTIN TIME PARTIAL: CPT | Performed by: STUDENT IN AN ORGANIZED HEALTH CARE EDUCATION/TRAINING PROGRAM

## 2023-10-15 PROCEDURE — 36415 COLL VENOUS BLD VENIPUNCTURE: CPT

## 2023-10-15 PROCEDURE — 80053 COMPREHEN METABOLIC PANEL: CPT

## 2023-10-15 PROCEDURE — 250N000011 HC RX IP 250 OP 636

## 2023-10-15 PROCEDURE — 99291 CRITICAL CARE FIRST HOUR: CPT | Performed by: SURGERY

## 2023-10-15 PROCEDURE — 99233 SBSQ HOSP IP/OBS HIGH 50: CPT | Performed by: STUDENT IN AN ORGANIZED HEALTH CARE EDUCATION/TRAINING PROGRAM

## 2023-10-15 PROCEDURE — 250N000013 HC RX MED GY IP 250 OP 250 PS 637: Performed by: PHARMACIST

## 2023-10-15 PROCEDURE — 82805 BLOOD GASES W/O2 SATURATION: CPT | Performed by: STUDENT IN AN ORGANIZED HEALTH CARE EDUCATION/TRAINING PROGRAM

## 2023-10-15 PROCEDURE — 85610 PROTHROMBIN TIME: CPT | Performed by: STUDENT IN AN ORGANIZED HEALTH CARE EDUCATION/TRAINING PROGRAM

## 2023-10-15 PROCEDURE — 999N000157 HC STATISTIC RCP TIME EA 10 MIN

## 2023-10-15 PROCEDURE — 250N000009 HC RX 250: Mod: JZ | Performed by: SURGERY

## 2023-10-15 PROCEDURE — 250N000011 HC RX IP 250 OP 636: Performed by: STUDENT IN AN ORGANIZED HEALTH CARE EDUCATION/TRAINING PROGRAM

## 2023-10-15 PROCEDURE — 999N000253 HC STATISTIC WEANING TRIALS

## 2023-10-15 PROCEDURE — 200N000002 HC R&B ICU UMMC

## 2023-10-15 PROCEDURE — 250N000011 HC RX IP 250 OP 636: Mod: JZ

## 2023-10-15 PROCEDURE — 83735 ASSAY OF MAGNESIUM: CPT | Performed by: PHYSICIAN ASSISTANT

## 2023-10-15 PROCEDURE — C9113 INJ PANTOPRAZOLE SODIUM, VIA: HCPCS | Mod: JZ

## 2023-10-15 PROCEDURE — 83605 ASSAY OF LACTIC ACID: CPT | Performed by: PHYSICIAN ASSISTANT

## 2023-10-15 PROCEDURE — 85520 HEPARIN ASSAY: CPT | Performed by: SURGERY

## 2023-10-15 PROCEDURE — 84100 ASSAY OF PHOSPHORUS: CPT | Performed by: PHYSICIAN ASSISTANT

## 2023-10-15 PROCEDURE — P9016 RBC LEUKOCYTES REDUCED: HCPCS

## 2023-10-15 PROCEDURE — 94003 VENT MGMT INPAT SUBQ DAY: CPT

## 2023-10-15 PROCEDURE — 250N000013 HC RX MED GY IP 250 OP 250 PS 637: Performed by: STUDENT IN AN ORGANIZED HEALTH CARE EDUCATION/TRAINING PROGRAM

## 2023-10-15 RX ORDER — HEPARIN SODIUM 10000 [USP'U]/100ML
0-5000 INJECTION, SOLUTION INTRAVENOUS CONTINUOUS
Status: DISPENSED | OUTPATIENT
Start: 2023-10-15 | End: 2023-10-16

## 2023-10-15 RX ORDER — DEXMEDETOMIDINE HYDROCHLORIDE 4 UG/ML
.1-1.2 INJECTION, SOLUTION INTRAVENOUS CONTINUOUS
Status: DISCONTINUED | OUTPATIENT
Start: 2023-10-15 | End: 2023-10-21

## 2023-10-15 RX ADMIN — CHLORHEXIDINE GLUCONATE 15 ML: 1.2 RINSE ORAL at 07:44

## 2023-10-15 RX ADMIN — PANTOPRAZOLE SODIUM 40 MG: 40 INJECTION, POWDER, FOR SOLUTION INTRAVENOUS at 07:45

## 2023-10-15 RX ADMIN — DEXMEDETOMIDINE HYDROCHLORIDE 0.2 MCG/KG/HR: 400 INJECTION INTRAVENOUS at 09:00

## 2023-10-15 RX ADMIN — ACETAMINOPHEN 975 MG: 325 TABLET, FILM COATED ORAL at 07:44

## 2023-10-15 RX ADMIN — PROPOFOL 20 MCG/KG/MIN: 10 INJECTION, EMULSION INTRAVENOUS at 02:29

## 2023-10-15 RX ADMIN — OXYCODONE HYDROCHLORIDE 10 MG: 5 TABLET ORAL at 22:22

## 2023-10-15 RX ADMIN — HEPARIN SODIUM AND DEXTROSE 1900 UNITS/HR: 10000; 5 INJECTION INTRAVENOUS at 17:02

## 2023-10-15 RX ADMIN — NYSTATIN 500000 UNITS: 100000 SUSPENSION ORAL at 15:47

## 2023-10-15 RX ADMIN — INSULIN ASPART 1 UNITS: 100 INJECTION, SOLUTION INTRAVENOUS; SUBCUTANEOUS at 23:16

## 2023-10-15 RX ADMIN — OLIVE OIL AND SOYBEAN OIL 250 ML: 16; 4 INJECTION, EMULSION INTRAVENOUS at 20:35

## 2023-10-15 RX ADMIN — DEXMEDETOMIDINE HYDROCHLORIDE 0.4 MCG/KG/HR: 400 INJECTION INTRAVENOUS at 20:22

## 2023-10-15 RX ADMIN — HYDROMORPHONE HYDROCHLORIDE 0.3 MG: 1 INJECTION, SOLUTION INTRAMUSCULAR; INTRAVENOUS; SUBCUTANEOUS at 13:41

## 2023-10-15 RX ADMIN — NYSTATIN 500000 UNITS: 100000 SUSPENSION ORAL at 07:44

## 2023-10-15 RX ADMIN — ACETAMINOPHEN 975 MG: 325 TABLET, FILM COATED ORAL at 20:41

## 2023-10-15 RX ADMIN — NYSTATIN 500000 UNITS: 100000 SUSPENSION ORAL at 20:41

## 2023-10-15 RX ADMIN — ACETAMINOPHEN 975 MG: 325 TABLET, FILM COATED ORAL at 13:57

## 2023-10-15 RX ADMIN — CHLORHEXIDINE GLUCONATE 15 ML: 1.2 RINSE ORAL at 20:41

## 2023-10-15 RX ADMIN — HEPARIN SODIUM 1900 UNITS/HR: 10000 INJECTION, SOLUTION INTRAVENOUS at 04:41

## 2023-10-15 RX ADMIN — MAGNESIUM SULFATE HEPTAHYDRATE: 500 INJECTION, SOLUTION INTRAMUSCULAR; INTRAVENOUS at 20:26

## 2023-10-15 RX ADMIN — GABAPENTIN 300 MG: 300 CAPSULE ORAL at 22:22

## 2023-10-15 RX ADMIN — SENNOSIDES AND DOCUSATE SODIUM 1 TABLET: 50; 8.6 TABLET ORAL at 20:41

## 2023-10-15 RX ADMIN — CEFEPIME HYDROCHLORIDE 1 G: 1 INJECTION, POWDER, FOR SOLUTION INTRAMUSCULAR; INTRAVENOUS at 13:57

## 2023-10-15 RX ADMIN — ACETAMINOPHEN 975 MG: 325 TABLET, FILM COATED ORAL at 01:48

## 2023-10-15 RX ADMIN — ATORVASTATIN CALCIUM 10 MG: 10 TABLET, FILM COATED ORAL at 20:41

## 2023-10-15 RX ADMIN — FAMOTIDINE 20 MG: 10 INJECTION, SOLUTION INTRAVENOUS at 18:14

## 2023-10-15 RX ADMIN — FAMOTIDINE 20 MG: 10 INJECTION, SOLUTION INTRAVENOUS at 05:36

## 2023-10-15 RX ADMIN — NYSTATIN 500000 UNITS: 100000 SUSPENSION ORAL at 11:55

## 2023-10-15 RX ADMIN — Medication 12.5 MG: at 22:22

## 2023-10-15 RX ADMIN — Medication 5 MG: at 20:41

## 2023-10-15 ASSESSMENT — ACTIVITIES OF DAILY LIVING (ADL)
ADLS_ACUITY_SCORE: 45

## 2023-10-15 NOTE — PLAN OF CARE
Major Shift Events:  Sedated, following simple commands, moves upper extremities spontaneously. Withdraws in BUE and RLE. Mitts on d/t line pulling. Pupils unequal and reactive. Sinus tach with multiple PVCs. MAP goal greater than 65, in intervention needed to maintain goal. Intubated, CMV settings. minimal amount inline secretions. Lung sounds clear/diminished. No void, bladder scan x1 total volume 141. NG in place to LIS, 25-50 out every 4 hours. Wound vacs in place on LLE surgical incision and abdominal incision. Prop @ 20, Heparin @ 1900. TPN @ 50, lipids held while on prop.     Plan: Continue with plan of care, notify team of any acute changes  For vital signs and complete assessments, please see documentation flowsheets.

## 2023-10-15 NOTE — PROGRESS NOTES
Nephrology Progress Note  10/15/2023       Alfredo Burnham is a 68 yom with complex hx of TIA, HTN, blindness who presented to [facility] 9/24 with severe abdominal pain radiating to flank and back, CT revealed AAA with a contained rupture.  Taken to OR for aortobiiliac bypass and resection of ruptured pararenal aneurysm.   Post op course complicated by hypotension requiring pressors and metabolic acidosis.  Baseline Cr 1.0 on presentation but on the rise to >5 at time of renal consult for MAYA management and possible RRT.       Interval History :   Mr Burnham had CRRT stopped 10/4, stable on iHD since until 10/13/2023.  Appears that he overall tolerated iHD treatment 10/13/2023 but after treatment completed while preparing for transfer off HD unit he had a sudden onset of severe desat requiring intubation, hypotension requiring norepi and was transferred to ICU.      Net positive 1.4L yesterday  FiO2 40%, PEEP 5  No pressors  BP acceptable  K 4, Bicarb 21, Calcium 8.6, Phos 7.1  1.15L of NG output yesterday    Assessment & Recommendations:   MAYA-Baseline Cr 1.0, ordered UA.  CT showed benign cyst but otherwise normal kidneys.  Cr on the rise since surgery, did receive contrast for CTA.  Urine microscopy showed granular casts suggesting ATN which will recover with time and stabilizing hemodynamics.  Started on CRRT 9/30 for volume and clearance with minimal UOP and rising Cr.                  -Started CRRT 9/30 for volume and clearance, stopped 10/4 and now running iHD PRN and watching for recovery.       -Hold on RRT today. Resp, hemodynamics, electrolytes okay so no need to transition to CRRT. Next iHD pending clinical course tentatively planned for 10/16/2023 but will eval in the AM to determine if iHD appropriate vs CRRT.      -Line is RIJ temp line from 10/6                 -Dialysis consent signed and scanned into media tab.      Volume- Follow up on Monday to determine fluid pull with iHD if stable  "    Electrolytes-K 4.0, bicarb 21, Na 138.        BMD-Ca 8.6, Mg 2.6.  Phos 7.1,      Anemia-Hgb 7.3, ~14 on presentation, acute management per team.       Nutrition-TPN started 10/12, recommend no Phos in TPN      Time spent: 20 minutes on this date of encounter for chart review, physical exam, medical decision making and co-ordination of care.     Review of Systems:   I reviewed the following systems:  ROS not done due to lethargy    Physical Exam:   I/O last 3 completed shifts:  In: 2507.93 [I.V.:885.6; NG/GT:190]  Out: 525 [Emesis/NG output:425; Drains:100]   /74   Pulse 98   Temp 100  F (37.8  C) (Axillary)   Resp 18   Ht 1.727 m (5' 8\")   Wt 76.7 kg (169 lb 1.5 oz)   SpO2 99%   BMI 25.71 kg/m       GENERAL APPEARANCE: Extubated, lethargic, in no distress.   EYES: No scleral icterus  Pulmonary: On vent 40%/8 of PEEP  CV: Regular rhythm, normal rate   - Edema +1-2 generalized, + scrotal edema.    GI: distended, nontender  MS: no evidence of inflammation in joints, no muscle tenderness  : No Lu  SKIN: no rash, warm, dry  NEURO: No focal deficits.         Labs:   All labs reviewed by me  Electrolytes/Renal -   Recent Labs   Lab Test 10/15/23  1153 10/15/23  0737 10/15/23  0359 10/15/23  0353 10/14/23  0400 10/14/23  0349 10/13/23  2350 10/13/23  2130   NA  --   --   --  136  --  137  --  140   POTASSIUM  --   --   --  4.0  --  4.0  --  4.1   CHLORIDE  --   --   --  96*  --  97*  --  102   CO2  --   --   --  21*  --  26  --  24   BUN  --   --   --  95.8*  --  59.6*  --  42.1*   CR  --   --   --  5.77*  --  4.19*  --  3.24*   * 137* 114* 123*   < > 150*   < > 145*   OMAR  --   --   --  8.6*  --  8.2*  --  8.4*   MAG  --   --   --  2.6*  --  1.8  --  1.9   PHOS  --   --   --  7.1*  --  6.2*  --  5.1*    < > = values in this interval not displayed.       CBC -   Recent Labs   Lab Test 10/15/23  0353 10/14/23  1426 10/14/23  1050   WBC 13.8* 16.5* 16.7*   HGB 7.3* 7.8* 7.8*   * 162 157 "       LFTs -   Recent Labs   Lab Test 10/15/23  0353 10/14/23  0349 10/13/23  2130   ALKPHOS 98 82 115   BILITOTAL 0.4 0.4 0.5   ALT 34 39 47   AST 65* 66* 89*   PROTTOTAL 5.5* 5.8* 6.7   ALBUMIN 2.5* 3.1* 3.2*       Iron Panel - No lab results found.        Current Medications:   acetaminophen  975 mg Oral or Feeding Tube Q6H    atorvastatin  10 mg Oral or Feeding Tube QPM    ceFEPIme  1 g Intravenous Q24H    chlorhexidine  15 mL Mouth/Throat Q12H    famotidine  20 mg Intravenous Q12H    gabapentin  300 mg Oral or Feeding Tube At Bedtime    insulin aspart  1-12 Units Subcutaneous Q4H    [Held by provider] lipids plant base  250 mL Intravenous Q24H    melatonin  5 mg Oral or Feeding Tube QPM    nystatin  500,000 Units Oral 4x Daily    pantoprazole  40 mg Per Feeding Tube QAM AC    Or    pantoprazole  40 mg Intravenous QAM AC    polyethylene glycol  17 g Oral or Feeding Tube Daily    QUEtiapine  12.5-25 mg Oral or Feeding Tube At Bedtime    senna-docusate  1 tablet Oral or Feeding Tube BID    sodium chloride (PF)  3 mL Intravenous Q8H      dexmedeTOMIDine 0.4 mcg/kg/hr (10/15/23 1003)    dextrose      dextrose      dextrose      propofol Stopped (10/15/23 0921)    And    - MEDICATION INSTRUCTIONS -      norepinephrine Stopped (10/14/23 0000)    parenteral nutrition - ADULT compounded formula      parenteral nutrition - ADULT compounded formula 50 mL/hr at 10/15/23 1300

## 2023-10-15 NOTE — SIGNIFICANT EVENT
SPIRITUAL HEALTH SERVICES Significant Event  [employer]OINTING  [facility] ([place] 4e    Pt anointed by Father Edgardo Fernandes   Pager [phone]

## 2023-10-15 NOTE — PROGRESS NOTES
Vascular Surgery Progress Note  10/15/2023       Subjective:  Night uneventful. Sedated this am, although following simple commands, moving BUE, RLE.     Objective:  Temp:  [98.9  F (37.2  C)-99.3  F (37.4  C)] 98.9  F (37.2  C)  Pulse:  [102-113] 108  Resp:  [13-33] 19  BP: ()/(49-91) 122/74  FiO2 (%):  [40 %] 40 %  SpO2:  [99 %-100 %] 100 %    I/O last 3 completed shifts:  In: 2507.93 [I.V.:885.6; NG/GT:190]  Out: 525 [Emesis/NG output:425; Drains:100]      Gen: resting comfortably in bed in ICU, sedated, intubated.  CV: RR per DP pulse, off pressors, sinus tach per tele   Resp: intubated, on minimal vent settings  Abd: Wound vac in place, holding seal, small area with  fluid collection under vac plastic at dependent portion wound, vac still holding seal. abdomen nontender to palpation around VAC. Binder off today ingrid assess  Ext: RLE wwp, palpable DP pulse,  LLE cool to touch, dusky. Blistering appreciated from knee to foot.     Labs:  Recent Labs   Lab 10/15/23  0353 10/14/23  1426 10/14/23  1050   WBC 13.8* 16.5* 16.7*   HGB 7.3* 7.8* 7.8*   * 162 157       Recent Labs   Lab 10/15/23  0737 10/15/23  0359 10/15/23  0353 10/14/23  0400 10/14/23  0349 10/13/23  2350 10/13/23  2130   NA  --   --  136  --  137  --  140   POTASSIUM  --   --  4.0  --  4.0  --  4.1   CHLORIDE  --   --  96*  --  97*  --  102   CO2  --   --  21*  --  26  --  24   BUN  --   --  95.8*  --  59.6*  --  42.1*   CR  --   --  5.77*  --  4.19*  --  3.24*   * 114* 123*   < > 150*   < > 145*   OMAR  --   --  8.6*  --  8.2*  --  8.4*   MAG  --   --  2.6*  --  1.8  --  1.9   PHOS  --   --  7.1*  --  6.2*  --  5.1*    < > = values in this interval not displayed.      Imaging  Narrative & Impression   EXAMINATION: CT CHEST/ABDOMEN/PELVIS W CONTRAST, 10/13/2023 11:33 PM     COMPARISON STUDY: Abdominal radiograph 10/10/2023     INDICATION: new desat episode, hypotension, eval ? infectious source     TECHNIQUE: CT scan of the chest,  abdomen and pelvis was performed on  multidetector CT scanner using volumetric acquisition technique and  images were reconstructed in multiple planes with variable thickness  and reviewed on dedicated workstations.      CONTRAST: iopamidol (ISOVUE-370) solution 104 m. Without oral  contrast.     CT scan radiation dose is optimized to minimum requisite dose using  automated dose modulation techniques.     Findings:      Chest:  Lungs: Bilateral small-to-moderate pleural effusions with associated  atelectasis and ground glass opacities, left greater than right.  Basilar predominant interlobular septal thickening. No pneumothorax.     Mediastinum: Heart size is within normal limits. No pericardial  effusion. Aorta and main pulmonary artery caliber are within normal  limits No mediastinal lymphadenopathy. Small volume of air within the  left brachiocephalic vein (series 5, image 33) just prior to entering  the superior vena cava, likely iatrogenic secondary left upper  extremity central venous catheter. Enteric tube coursing the esophagus  terminating in the stomach lumen, otherwise the esophagus is  unremarkable.         Abdomen/Pelvis:    Liver: No mass. Mild intrahepatic biliary dilatation. Hypodensity at  the lateral aspect of the right hepatic lobe measuring up to 11 mm  (series 5, image 103).      Gallbladder/CBD: Mildly distended gallbladder with layering sludge.  Common bile duct within normal limits for patient's age.     Pancreas: Pancreas appears to be normal enhancing with peripancreatic  hypoattenuating collection this primarily hypoattenuating but with  areas of increased hyperattenuation. This suggests peripancreatic  fluid collection with hemorrhage.     Spleen: Surrounded by hypoattenuating fluid, otherwise unremarkable.     Adrenal glands: Normal.     Kidneys/ureters/bladder: Heterogeneous hypoattenuation of the kidneys  bilaterally with wedgelike confluent hypoattenuation in the interpolar  and  superior pole of the left kidney (series series 5, images  127-156). No obstructing renal stone, hydronephrosis, renal masses.      Genitourinary/reproductive tract: Normal bladder. Reproductive organs  are within normal limits.     Gastrointestinal: Hypoattenuating of the large bowel with wall  thickening starting from the rectum and extending to the mid  transverse colon with surrounding fat stranding (160-257). Colonic  diverticulosis. Normal caliber small bowel. Appendix not seen. Stomach  and esophagus are unremarkable.     Vasculature: Postsurgical changes of ruptured AAA open repair with  mixed attenuation clot surrounding the postsurgical repairs. Narrow  lumen inferior vena cava compressed by hematoma at the level of the  kidneys. Small hematoma at the aortic bifurcation. Patent hepatic  portal vein.     Retroperitoneum/peritoneum/mesentery: Ascites throughout the  peritoneal cavity surrounding the liver or spleen, infrarenal aorta  and layering in the pelvis. 2 cm hematoma at the aortic bifurcation. 2  cm hematoma just below the level of the renal veins.     Bones: No acute or aggressive appearing osseous bodies. Ultimately  degenerative changes of the spine.     Soft tissues: Enlarged left iliacus muscle with hypodense  heterogeneous appearance measuring up to 6.6 cm (series 5, image 224).  Diffuse anasarca.                                                                      IMPRESSION:   1.  Enlarged heterogeneously hypoattenuating left illiacus muscle  measuring up to 6.6 cm. Findings may represent developing iliacus  hematoma. Consider CTA abdomen/pelvis to further evaluate for active  bleeding within the hematoma.  2.  Narrow caliber infrahepatic IVC with appearance of compression in  between thrombus secondary to AAA rupture and abdominal aorta;  findings may represent both hypotension and potential compression  secondary to the surrounding hematoma.   3.  Bilateral, left greater than right,  wedge-shaped hypodensities  suggesting infarcts.   4.  Edematous left colon. There appears to be mucosal enhancement. No  evidence of pneumatosis intestinalis or free air in the abdomen.  5.  Pancreas appears perfused but there is peripancreatic fluid with  hemorrhage. No pseudoaneurysm or extravasation identified.   6.  Diffuse intraperitoneal ascites and mixed attenuating hematoma,  along the right inferior renal aorta and aortic bifurcation, likely  related to recent AAA rupture and subsequent repair.  7.  Bilateral pleural effusions with associated atelectasis and  groundglass opacities, likely representing pulmonary edema.  8.  Diffuse anasarca.              [Urgent Result: Suspected left iliac is hematoma measuring up 6.6 cm;  suspected devascularized interpolar and superior left kidney.]     Finding was identified on 10/14/2023 12:09 PM.        Assessment/Plan:   68 year old male with PMH of HTN and previous TIA who presented with R sided abdominal pain found to have contained ruptured AAA, now s/p open AAA repair 9/24 into 9/25am with 30 min supraceliac clamp time, prolonged inter-renal clamp time, temporary abdominal closure. He did have bloody stool postop with flex sig 9/25 demonstrating ischemic mucosal changes of the rectum, however on repeated abdominal looks he has had no evidence of transmural necrosis of the small or large intestine, or the rectum. On 9/29 he was started on CRRT given ongoing low UOP and rising Cr and failure of lasix challenge. Now on iHD. Otherwise hemodynamically continues to do well off pressors. Ischemic LLE unchanged, note DVT in this leg, this is to be expected given ischemia and absent inflow. We will continue to monitor LLE given it is not currently making patient systemically ill and there is no indication for urgent intervention. S/p closure of abdominal fascia and subcutaneous tissue left open with wound VAC applied 10/2/23. Left AKA on hold at this time, as leukocytosis  improves and supplemental oxygen requirement downtrend. Continues to have large NGT output, high risk for aspiration and further worsening infectious process. Desaturation evening 10/13 requiring reintubation, transfer back to SICU. CT completed which demonstrated peripancreatic fluid collection with concern for hemorrhage, as well as L iliacus muscle hematoma. Hemoglobin today 7.3 from 7.8. Giving 1u pRBC,  with recheck later today.       Neuro:   - sedation per SICU  - appreciate sedation vacation and assessment neuro status  - Stroke workup negative -- MRI with multiple small infarcts, follow-up with neurology 4-6 weeks after discharge, ok with a/c.   - ADDIS for workup needs to be done - plan for 10/16 tentatively,  no clear embolic source noted on TTE.  CV:   - Off pressors, midodrine 20mg daily prn with HD runs   - Goal SBP <160, MAP >65  - labetalol prn for SBP >160  - continue statin  - PE+, venous duplex with nonocclusive to occlusive thrombus of the left GSV from the level of the knee to the groin and nonocclusive thrombus of the left distal femoral vein and popliteal veins, increased in extent compared to 9/30/2023.   - Continue with heparin gtt.  - ADDIS Monday 10/16 given embolic multiple small infarcts on stroke ochoa.   Resp:   - Desaturation episode, reintubated overnight  - vent settings per SICU, wean as tolerated.   GI:   - pancreatitis with peripancreatic fluid collection on CT  - Do not expect this anterior location of pancreatic fluid to be related to surgical procedure as we were in the RP and did see inferior/posterior aspect of pancreas, but would expect if it were to be surgery related it would be more posterior.   - Appreciate GI consult and evaluation regarding fluid collection, no indication to drain at this time given low concern for infection.  - Fluid collection is not surrounding aortic graft, this is reassuring.  - NPO, NGT in place to LIS   - Continue TPN   - NG output, although  decreasing, 1.1L over 24 hours, 75cc mn to 7am. Still high. Will continue.   - Will come assess wound vac today given report and pictures showing increasing fluid at base incision.  FEN:   - electrolyte replacement prn   Renal:   - Appreciate nephrology recommendations  - continue iHD  Heme:   -Hgb 7.3 today from 7.8 yesterday - 1u pRBC  - recheck hgb this afternoon, if hgb drops - rec IR consult for consideration angiogram to evaluate iliacus muscle hematoma for active bleeding, would be okay with CTA if necessary  -DVT as above, PE   -Continue hep gtt at this time.   - PT/OT ROM as able.  ID:   - known aspiration event 10/9   - monitor signs of pneumonia  - nystatin swish for oral candida  -leukocytosis post reintubation, however this is improving with each re-check  MSK:   - L AKA on schedule for next week Tuesday, nerve block with RAPS team prior to case  Derm:   - Has rash on abdomen, trunk, anterior bilateral thighs, erythematous, blanching, however maculopapular does not seem consistent with cellulitis  - Pharmacy reviewed medications for possible causes of rash and felt all were relatively low risk however not 0 risk   -  benadryl cream topically applied with no improvement  - trialed abd binder off for short time to see if improvement (?moisture rash), none seen.   - Dermatology consult to eval for other possible cause.   Misc:   - VAC changes with vascular surgery M/W/F  - abd binder on at all times.  - on hep gtt no dvt ppx needed  - SICU      Discussed  patient with , vascular surgery attending     Will Donald MD   Vascular Surgery PGY3

## 2023-10-15 NOTE — PROGRESS NOTES
Major Shift Events: neuro unchanged, able to follow simple commands. Propofol discontinued, precedex initiated. Able to move upper and lower extremities and RLE. Wound vac on LLE and abd. Abd wound vac changed by vascular fellow. Pupils remain unequal, reactive. Sinus tachy with frequent PVC's. Tmax of 100.3, MD aware. Hgb 7.0, 1 unit of PRBC's given recheck of 7.8. LS clear/coarse bases diminished. Anuric. NG to LIS. Precedex gtts, heparin gtts, TPN gtts.   Plan: Continue POC.   For vital signs and complete assessments, please see documentation flowsheets.

## 2023-10-15 NOTE — PROGRESS NOTES
SURGICAL ICU PROGRESS NOTE  10/15/2023        Date of Service (when I saw the patient): 10/15/2023    ASSESSMENT:  Alfredo Burnham is a 68 year old male who was admitted to the SICU on 9/24/2023 s/p open AAA repair. Patient has a history of HTN and previous TIA. He presented to the ED on 9/24 with right sided abdominal pain and was found to have a contained, ruptured AAA on CT. 30 min super-celiac clamp time. Prolonged intra-renal clamp. 9/25 s/p flex sig showing rectal ischemia, abdominal washout showing a hemostatic AAA graft and no evidence of transmural colonic or small bowel ischemia, and an unsuccessful LLE embolectomy. 9/26 s/p repeat abdominal washout, retroperitoneal closure, LLE wound washout. 9/29 s/p repeat abd washout, bowel appeared well perfused w/o notable ischemia. OR 10/2 for abdominal fascia closure. Extubated 10/4. Unequal pupils and R facial droop, MRI brain showed multiple small infarcts, likely embolic. Patient was transferred out of the surgical ICU on 10/12. On 10/13, patient completed dialysis run and suddenly started experiencing desaturation. Patient was emergently intubated for Acute Hypoxic Respiratory Failure in dialysis unit and transferred to SICU for further cares.       CHANGES and MAJOR THINGS TODAY:   - Recheck Hgb at 1pm  - GI saw pt and did not think intervention was warranted for peripanc fluid collection  - sedation holiday today to asses neuro status  - PSTs today  - Hold heparin at 4AM per RAPS   - If Hgb continues to drop, consult IR to discuss possible intervention       PLAN:    Neurological:  # Sedation  - sedation holiday from propofol gtt for neuro exam  # Acute pain  - Multi-modal pain control effective (scheduled tylenol, gabapentin TID, oxycodone PRN, dilaudid PRN, robaxin PRN)  - Nerve block Monday 10/16      #Facial droop, left lacunar infarct  #Punctate lesions of cerebellum   - continue high dose heparin   - Follow-up with stroke neurology 4-6 weeks  after discharge  - ADDIS Monday      # Delirium  # Mixed metabolic encephalopathy   # Sedation, RASS goal 0 to -1  - Monitor neurological status. Delirium preventions and precautions  - Melatonin q6pm and seroquel for sleep aid  - Atarax PRN for anxiety     Pulmonary:  # Acute hypoxic respiratory failure  # Re-intubation 10/13 (after prev extubation 10/3)  # Pulmonary embolism   Satting well overnight, 100%.   - Mechanical ventilation  / FiO2 40% / RR 14 / PEEP 5  - Work on weaning ventilation as tolerated  - PSTs    Cardiovascular:    # Contained AAA rupture s/p open repair 9/25  # Acute critical limb ischaemia of LLE  # Hx of HTN  # Tachycardia  # L iliacus hematoma  # Peripancreatic hematoma   Hgb continues to trend down (7.3 this morning after 7.8 evening 10/14). No concern for active bleeding from peripanc or iliacus hematoma per GI and vascular surg.   - Hgb 7.3, recheck at 1PM, transfuse if needed  - Continue to monitor hemodynamic status; Goal MAP >65, SBP <160  - Ischaemic LLE, L AKA delayed until Tuesday 10/17  - High dose heparin for PE  - Hold heparin 4 hrs prior to RAPs intervention   - Continue Lipitor 10 mg    GI/Nutrition:    # s/p open AAA repair, wound vac device in place  # Post-operative melena, resolved  # Rectal ischemia, concern for, resolved  # Aspiration event 10/9  Continued high NG output, 1150 yesterday and 75 charted since midnight. GI team saw regarding peripanc fluid collection but did not feel intervention was warranted. collection of blood under abdominal wound vac.    - Strict NPO  - NGT to LIS  - BR with senna BID and miralax daily  - continue TPN for nutrition  - Monitor electrolytes    Renal/Fluids/Electrolytes:  # Protein calorie deficit malnutrition, risk of   # Acute kidney injury  # Oliguria  # Haemodialysis  - No IVF  - dialysis per nephrology; last iHD 10/13  - Nephrology following  - Strict intake and output    Endocrine:  # Stress hyperglycaemia  - High SSI for  glucose control  - q6h BG checks    Infectious disease:   # oropharyngeal candidiasis  # Likely aspiration event during ADDIS  White down to 13.8 (from 24). Sputum culture unable to be collected due to weak cough.  - Continue cefepime (10/14- )  - Nystatin suspension    Hematology:    # Acute blood loss anemia  Hgb 7.3 this morning.   - recheck hgb afternoon   - INR 1.20  - Continue heparin gtt    MSK:  # Weakness and deconditioning of critical illness  # Left lower limb ischemia   - Passive ROM at bedside with RN  Marisol WINSLOW likely Tuesday 10/17; planning for block prior to surgery with RAPS  - Physical therapy when possible  - Podiatry consult for nail clipping       General Cares/Prophylaxis:    DVT Prophylaxis: Pneumatic Compression Devices and heparin gtt  GI Prophylaxis: PPI  Restraints: Restraints for medical healing needed: YES    Lines/ tubes/ drains:  - ETT  - Right internal jugular double lumen  - Left tripple lumen suvbclavian central line   - NGT  - PIVs in RUE  - Abdominal wound vac  - LLE wound vac    Disposition:  - Surgical ICU     Patient seen, findings and plan discussed with surgical ICU staff, Dr. Aranda.      Jenni Mora, MS4  Surgical ICU    ====================================  INTERVAL HISTORY:   No acute events overnight. Can kind of respond with nods/head shakes to simple questions. Can open eyes to command.     ROS unable to be performed due to sedation.       OBJECTIVE:   1. VITAL SIGNS:   Temp:  [97.9  F (36.6  C)-99.8  F (37.7  C)] 99.2  F (37.3  C)  Pulse:  [101-113] 103  Resp:  [13-33] 17  BP: ()/(49-91) 111/74  FiO2 (%):  [40 %] 40 %  SpO2:  [97 %-100 %] 100 %  Vent Mode: CMV/AC  (Continuous Mandatory Ventilation/ Assist Control)  FiO2 (%): 40 %  Resp Rate (Set): 14 breaths/min  Tidal Volume (Set, mL): 450 mL  PEEP (cm H2O): 5 cmH2O  Resp: 17      2. INTAKE/ OUTPUT:   I/O last 3 completed shifts:  In: 3038.98 [I.V.:902.65; NG/GT:250]  Out: 1150 [Emesis/NG  output:1150]    3. PHYSICAL EXAMINATION:  General: intubated, sedated          HEENT: normocephalic,atraumatic  Neuro: sedated on propofol, occasionally opens eyes, moving upper extremities spontaneously   Pulm/Resp: intubated, 450 / 40% / 14 / 5    CV: tachycardic  Abdomen: Abdomen mildly distended. Midline laparotomy incision with wound vac in place with serosanguinous output in canister.  abdominal binder not in place. Blood collection under abdominal wound vac dressing.   Gu: decreased scrotal swelling.  MSK/Extremities: Ischaemic LLE, wound vac in place, scant dark brown output in canister. No peripheral edema. RLE with slightly dusky fifth, small toe, rest of RLE normal in appearance. Right lower DP and PT palpable. BUE with palpable radial pulses.     4. INVESTIGATIONS:   Arterial Blood Gases   Recent Labs   Lab 10/14/23  0111   PH 7.41  7.41   PCO2 41  41   PO2 85  85   HCO3 26  26     Complete Blood Count   Recent Labs   Lab 10/15/23  0353 10/14/23  1426 10/14/23  1050 10/14/23  0740   WBC 13.8* 16.5* 16.7* 17.1*   HGB 7.3* 7.8* 7.8* 7.1*   * 162 157 168     Basic Metabolic Panel  Recent Labs   Lab 10/15/23  0359 10/15/23  0353 10/14/23  2335 10/14/23  2015 10/14/23  0400 10/14/23  0349 10/13/23  2350 10/13/23  2130 10/13/23  2034   NA  --  136  --   --   --  137  --  140 138   POTASSIUM  --  4.0  --   --   --  4.0  --  4.1 3.8   CHLORIDE  --  96*  --   --   --  97*  --  102 102   CO2  --  21*  --   --   --  26  --  24 20*   BUN  --  95.8*  --   --   --  59.6*  --  42.1* 39.9*   CR  --  5.77*  --   --   --  4.19*  --  3.24* 2.93*   * 123* 130* 129*   < > 150*   < > 145* 178*    < > = values in this interval not displayed.     Liver Function Tests  Recent Labs   Lab 10/15/23  0353 10/14/23  0349 10/13/23  2130 10/13/23  2034 10/13/23  0320   AST 65* 66* 89*  --  57*   ALT 34 39 47  --  30   ALKPHOS 98 82 115  --  91   BILITOTAL 0.4 0.4 0.5  --  0.4   ALBUMIN 2.5* 3.1* 3.2*  --  2.6*   INR  1.32*  --  1.20* 1.18* 1.40*     Pancreatic Enzymes  Recent Labs   Lab 10/14/23  1017   LIPASE 51     Coagulation Profile  Recent Labs   Lab 10/15/23  0353 10/13/23  2130 10/13/23  2034 10/13/23  0320   INR 1.32* 1.20* 1.18* 1.40*   PTT >240* 125* 121*  --          5. RADIOLOGY:   No results found for this or any previous visit (from the past 24 hour(s)).    =========================================    I saw and evaluated the patient in an independent visit and agree with the student's assessment and plan as written above. I was present at all times for any mentioned procedures.     Clint Braga MD  PGY-1, Neurosurgery  Off-service on SICU

## 2023-10-16 ENCOUNTER — APPOINTMENT (OUTPATIENT)
Dept: GENERAL RADIOLOGY | Facility: CLINIC | Age: 68
DRG: 268 | End: 2023-10-16
Payer: COMMERCIAL

## 2023-10-16 ENCOUNTER — APPOINTMENT (OUTPATIENT)
Dept: CARDIOLOGY | Facility: CLINIC | Age: 68
DRG: 268 | End: 2023-10-16
Payer: COMMERCIAL

## 2023-10-16 ENCOUNTER — APPOINTMENT (OUTPATIENT)
Dept: PHYSICAL THERAPY | Facility: CLINIC | Age: 68
DRG: 268 | End: 2023-10-16
Payer: COMMERCIAL

## 2023-10-16 ENCOUNTER — APPOINTMENT (OUTPATIENT)
Dept: GENERAL RADIOLOGY | Facility: CLINIC | Age: 68
DRG: 268 | End: 2023-10-16
Attending: STUDENT IN AN ORGANIZED HEALTH CARE EDUCATION/TRAINING PROGRAM
Payer: COMMERCIAL

## 2023-10-16 ENCOUNTER — ANESTHESIA EVENT (OUTPATIENT)
Dept: SURGERY | Facility: CLINIC | Age: 68
DRG: 268 | End: 2023-10-16
Payer: COMMERCIAL

## 2023-10-16 LAB
ALBUMIN SERPL BCG-MCNC: 2.5 G/DL (ref 3.5–5.2)
ALP SERPL-CCNC: 82 U/L (ref 40–129)
ALT SERPL W P-5'-P-CCNC: 33 U/L (ref 0–70)
ANION GAP SERPL CALCULATED.3IONS-SCNC: 25 MMOL/L (ref 7–15)
APTT PPP: 50 SECONDS (ref 22–38)
AST SERPL W P-5'-P-CCNC: 59 U/L (ref 0–45)
ATRIAL RATE - MUSE: 102 BPM
ATRIAL RATE - MUSE: 110 BPM
BASE EXCESS BLDV CALC-SCNC: -7.9 MMOL/L (ref -7.7–1.9)
BILIRUB SERPL-MCNC: 0.4 MG/DL
BUN SERPL-MCNC: 142 MG/DL (ref 8–23)
CA-I BLD-MCNC: 4.5 MG/DL (ref 4.4–5.2)
CALCIUM SERPL-MCNC: 8.5 MG/DL (ref 8.8–10.2)
CHLORIDE SERPL-SCNC: 91 MMOL/L (ref 98–107)
CREAT SERPL-MCNC: 7.2 MG/DL (ref 0.67–1.17)
DEPRECATED HCO3 PLAS-SCNC: 15 MMOL/L (ref 22–29)
DIASTOLIC BLOOD PRESSURE - MUSE: NORMAL MMHG
DIASTOLIC BLOOD PRESSURE - MUSE: NORMAL MMHG
EGFRCR SERPLBLD CKD-EPI 2021: 8 ML/MIN/1.73M2
ERYTHROCYTE [DISTWIDTH] IN BLOOD BY AUTOMATED COUNT: 18.7 % (ref 10–15)
GLUCOSE BLDC GLUCOMTR-MCNC: 118 MG/DL (ref 70–99)
GLUCOSE BLDC GLUCOMTR-MCNC: 120 MG/DL (ref 70–99)
GLUCOSE BLDC GLUCOMTR-MCNC: 127 MG/DL (ref 70–99)
GLUCOSE BLDC GLUCOMTR-MCNC: 143 MG/DL (ref 70–99)
GLUCOSE BLDC GLUCOMTR-MCNC: 151 MG/DL (ref 70–99)
GLUCOSE BLDC GLUCOMTR-MCNC: 153 MG/DL (ref 70–99)
GLUCOSE SERPL-MCNC: 135 MG/DL (ref 70–99)
HCO3 BLDV-SCNC: 17 MMOL/L (ref 21–28)
HCT VFR BLD AUTO: 23.4 % (ref 40–53)
HGB BLD-MCNC: 7.7 G/DL (ref 13.3–17.7)
HGB BLD-MCNC: 7.8 G/DL (ref 13.3–17.7)
HGB BLD-MCNC: 8.5 G/DL (ref 13.3–17.7)
INR PPP: 1.32 (ref 0.85–1.15)
INTERPRETATION ECG - MUSE: NORMAL
INTERPRETATION ECG - MUSE: NORMAL
LACTATE SERPL-SCNC: 1.2 MMOL/L (ref 0.7–2)
LVEF ECHO: NORMAL
MAGNESIUM SERPL-MCNC: 2.7 MG/DL (ref 1.7–2.3)
MCH RBC QN AUTO: 30.6 PG (ref 26.5–33)
MCHC RBC AUTO-ENTMCNC: 33.3 G/DL (ref 31.5–36.5)
MCV RBC AUTO: 92 FL (ref 78–100)
O2/TOTAL GAS SETTING VFR VENT: 40 %
OXYHGB MFR BLDV: 90 % (ref 70–75)
P AXIS - MUSE: 32 DEGREES
P AXIS - MUSE: 41 DEGREES
PCO2 BLDV: 32 MM HG (ref 40–50)
PH BLDV: 7.33 [PH] (ref 7.32–7.43)
PHOSPHATE SERPL-MCNC: 7.7 MG/DL (ref 2.5–4.5)
PLATELET # BLD AUTO: 145 10E3/UL (ref 150–450)
PO2 BLDV: 63 MM HG (ref 25–47)
POTASSIUM SERPL-SCNC: 4.2 MMOL/L (ref 3.4–5.3)
PR INTERVAL - MUSE: 124 MS
PR INTERVAL - MUSE: 128 MS
PREALB SERPL IA-MCNC: 17 MG/DL (ref 15–45)
PROT SERPL-MCNC: 5.4 G/DL (ref 6.4–8.3)
QRS DURATION - MUSE: 74 MS
QRS DURATION - MUSE: 74 MS
QT - MUSE: 356 MS
QT - MUSE: 376 MS
QTC - MUSE: 481 MS
QTC - MUSE: 490 MS
R AXIS - MUSE: 11 DEGREES
R AXIS - MUSE: 24 DEGREES
RBC # BLD AUTO: 2.55 10E6/UL (ref 4.4–5.9)
SODIUM SERPL-SCNC: 131 MMOL/L (ref 135–145)
SYSTOLIC BLOOD PRESSURE - MUSE: NORMAL MMHG
SYSTOLIC BLOOD PRESSURE - MUSE: NORMAL MMHG
T AXIS - MUSE: 50 DEGREES
T AXIS - MUSE: 62 DEGREES
UFH PPP CHRO-ACNC: 0.5 IU/ML
UFH PPP CHRO-ACNC: 0.61 IU/ML
UFH PPP CHRO-ACNC: 0.66 IU/ML
VENTRICULAR RATE- MUSE: 102 BPM
VENTRICULAR RATE- MUSE: 110 BPM
WBC # BLD AUTO: 13.5 10E3/UL (ref 4–11)

## 2023-10-16 PROCEDURE — C9113 INJ PANTOPRAZOLE SODIUM, VIA: HCPCS | Mod: JZ

## 2023-10-16 PROCEDURE — 85018 HEMOGLOBIN: CPT

## 2023-10-16 PROCEDURE — 71045 X-RAY EXAM CHEST 1 VIEW: CPT | Mod: 26 | Performed by: RADIOLOGY

## 2023-10-16 PROCEDURE — 85520 HEPARIN ASSAY: CPT | Performed by: SURGERY

## 2023-10-16 PROCEDURE — 83605 ASSAY OF LACTIC ACID: CPT | Performed by: PHYSICIAN ASSISTANT

## 2023-10-16 PROCEDURE — 250N000011 HC RX IP 250 OP 636: Mod: JZ | Performed by: CLINICAL NURSE SPECIALIST

## 2023-10-16 PROCEDURE — 94003 VENT MGMT INPAT SUBQ DAY: CPT

## 2023-10-16 PROCEDURE — B4185 PARENTERAL SOL 10 GM LIPIDS: HCPCS | Mod: JZ | Performed by: SURGERY

## 2023-10-16 PROCEDURE — G0463 HOSPITAL OUTPT CLINIC VISIT: HCPCS | Mod: 25

## 2023-10-16 PROCEDURE — 99418 PROLNG IP/OBS E/M EA 15 MIN: CPT | Performed by: CLINICAL NURSE SPECIALIST

## 2023-10-16 PROCEDURE — 99152 MOD SED SAME PHYS/QHP 5/>YRS: CPT | Performed by: STUDENT IN AN ORGANIZED HEALTH CARE EDUCATION/TRAINING PROGRAM

## 2023-10-16 PROCEDURE — 258N000003 HC RX IP 258 OP 636: Performed by: CLINICAL NURSE SPECIALIST

## 2023-10-16 PROCEDURE — 93325 DOPPLER ECHO COLOR FLOW MAPG: CPT

## 2023-10-16 PROCEDURE — 93320 DOPPLER ECHO COMPLETE: CPT | Mod: 26 | Performed by: STUDENT IN AN ORGANIZED HEALTH CARE EDUCATION/TRAINING PROGRAM

## 2023-10-16 PROCEDURE — 250N000009 HC RX 250: Performed by: SURGERY

## 2023-10-16 PROCEDURE — 250N000013 HC RX MED GY IP 250 OP 250 PS 637

## 2023-10-16 PROCEDURE — 99233 SBSQ HOSP IP/OBS HIGH 50: CPT | Mod: FS | Performed by: CLINICAL NURSE SPECIALIST

## 2023-10-16 PROCEDURE — 87799 DETECT AGENT NOS DNA QUANT: CPT

## 2023-10-16 PROCEDURE — 250N000011 HC RX IP 250 OP 636: Mod: JZ

## 2023-10-16 PROCEDURE — 84134 ASSAY OF PREALBUMIN: CPT | Performed by: SURGERY

## 2023-10-16 PROCEDURE — 999N000157 HC STATISTIC RCP TIME EA 10 MIN

## 2023-10-16 PROCEDURE — 200N000002 HC R&B ICU UMMC

## 2023-10-16 PROCEDURE — 83735 ASSAY OF MAGNESIUM: CPT | Performed by: PHYSICIAN ASSISTANT

## 2023-10-16 PROCEDURE — 85520 HEPARIN ASSAY: CPT | Performed by: INTERNAL MEDICINE

## 2023-10-16 PROCEDURE — 84100 ASSAY OF PHOSPHORUS: CPT | Performed by: PHYSICIAN ASSISTANT

## 2023-10-16 PROCEDURE — 250N000013 HC RX MED GY IP 250 OP 250 PS 637: Performed by: SURGERY

## 2023-10-16 PROCEDURE — 250N000009 HC RX 250

## 2023-10-16 PROCEDURE — 250N000013 HC RX MED GY IP 250 OP 250 PS 637: Performed by: STUDENT IN AN ORGANIZED HEALTH CARE EDUCATION/TRAINING PROGRAM

## 2023-10-16 PROCEDURE — 93325 DOPPLER ECHO COLOR FLOW MAPG: CPT | Mod: 26 | Performed by: STUDENT IN AN ORGANIZED HEALTH CARE EDUCATION/TRAINING PROGRAM

## 2023-10-16 PROCEDURE — 90937 HEMODIALYSIS REPEATED EVAL: CPT

## 2023-10-16 PROCEDURE — 87533 HHV-6 DNA QUANT: CPT

## 2023-10-16 PROCEDURE — 99291 CRITICAL CARE FIRST HOUR: CPT | Mod: GC | Performed by: SURGERY

## 2023-10-16 PROCEDURE — 999N000253 HC STATISTIC WEANING TRIALS

## 2023-10-16 PROCEDURE — 71045 X-RAY EXAM CHEST 1 VIEW: CPT

## 2023-10-16 PROCEDURE — 85610 PROTHROMBIN TIME: CPT | Performed by: SURGERY

## 2023-10-16 PROCEDURE — 999N000065 XR CHEST PORT 1 VIEW

## 2023-10-16 PROCEDURE — 85027 COMPLETE CBC AUTOMATED: CPT

## 2023-10-16 PROCEDURE — 82330 ASSAY OF CALCIUM: CPT | Performed by: PHYSICIAN ASSISTANT

## 2023-10-16 PROCEDURE — 93312 ECHO TRANSESOPHAGEAL: CPT | Mod: 26 | Performed by: STUDENT IN AN ORGANIZED HEALTH CARE EDUCATION/TRAINING PROGRAM

## 2023-10-16 PROCEDURE — 250N000013 HC RX MED GY IP 250 OP 250 PS 637: Performed by: ANESTHESIOLOGY

## 2023-10-16 PROCEDURE — 97110 THERAPEUTIC EXERCISES: CPT | Mod: GP | Performed by: PHYSICAL THERAPIST

## 2023-10-16 PROCEDURE — 80053 COMPREHEN METABOLIC PANEL: CPT | Performed by: SURGERY

## 2023-10-16 PROCEDURE — 85730 THROMBOPLASTIN TIME PARTIAL: CPT | Performed by: STUDENT IN AN ORGANIZED HEALTH CARE EDUCATION/TRAINING PROGRAM

## 2023-10-16 PROCEDURE — 250N000011 HC RX IP 250 OP 636

## 2023-10-16 PROCEDURE — 82805 BLOOD GASES W/O2 SATURATION: CPT | Performed by: STUDENT IN AN ORGANIZED HEALTH CARE EDUCATION/TRAINING PROGRAM

## 2023-10-16 PROCEDURE — 36415 COLL VENOUS BLD VENIPUNCTURE: CPT

## 2023-10-16 PROCEDURE — 93312 ECHO TRANSESOPHAGEAL: CPT

## 2023-10-16 RX ORDER — CETIRIZINE HYDROCHLORIDE 10 MG/1
10 TABLET ORAL EVERY 12 HOURS PRN
Status: DISCONTINUED | OUTPATIENT
Start: 2023-10-16 | End: 2023-10-22

## 2023-10-16 RX ORDER — HEPARIN SODIUM 10000 [USP'U]/100ML
0-5000 INJECTION, SOLUTION INTRAVENOUS CONTINUOUS
Status: DISPENSED | OUTPATIENT
Start: 2023-10-16 | End: 2023-10-17

## 2023-10-16 RX ORDER — TRIAMCINOLONE ACETONIDE 1 MG/G
OINTMENT TOPICAL 2 TIMES DAILY
Status: DISCONTINUED | OUTPATIENT
Start: 2023-10-16 | End: 2023-10-24

## 2023-10-16 RX ORDER — DEXTROSE MONOHYDRATE 100 MG/ML
INJECTION, SOLUTION INTRAVENOUS CONTINUOUS PRN
Status: DISCONTINUED | OUTPATIENT
Start: 2023-10-16 | End: 2023-10-16

## 2023-10-16 RX ORDER — HYDROCORTISONE 2.5 %
CREAM (GRAM) TOPICAL 2 TIMES DAILY
Status: DISCONTINUED | OUTPATIENT
Start: 2023-10-16 | End: 2023-10-31

## 2023-10-16 RX ORDER — FENTANYL CITRATE 50 UG/ML
INJECTION, SOLUTION INTRAMUSCULAR; INTRAVENOUS
Status: COMPLETED
Start: 2023-10-16 | End: 2023-10-16

## 2023-10-16 RX ORDER — LEVOFLOXACIN 5 MG/ML
750 INJECTION, SOLUTION INTRAVENOUS ONCE
Status: COMPLETED | OUTPATIENT
Start: 2023-10-16 | End: 2023-10-16

## 2023-10-16 RX ORDER — LEVOFLOXACIN 5 MG/ML
500 INJECTION, SOLUTION INTRAVENOUS
Status: COMPLETED | OUTPATIENT
Start: 2023-10-18 | End: 2023-10-20

## 2023-10-16 RX ORDER — LIDOCAINE HYDROCHLORIDE 20 MG/ML
5 SOLUTION OROPHARYNGEAL ONCE
Status: COMPLETED | OUTPATIENT
Start: 2023-10-16 | End: 2023-10-17

## 2023-10-16 RX ORDER — FENTANYL CITRATE 50 UG/ML
100 INJECTION, SOLUTION INTRAMUSCULAR; INTRAVENOUS ONCE
Status: COMPLETED | OUTPATIENT
Start: 2023-10-16 | End: 2023-10-16

## 2023-10-16 RX ORDER — CETIRIZINE HYDROCHLORIDE 10 MG/1
10 TABLET, CHEWABLE ORAL EVERY 12 HOURS
Status: DISCONTINUED | OUTPATIENT
Start: 2023-10-16 | End: 2023-10-16

## 2023-10-16 RX ORDER — TRIAMCINOLONE ACETONIDE 1 MG/G
CREAM TOPICAL 2 TIMES DAILY
Status: DISCONTINUED | OUTPATIENT
Start: 2023-10-16 | End: 2023-10-16

## 2023-10-16 RX ORDER — METOCLOPRAMIDE HYDROCHLORIDE 5 MG/ML
5 INJECTION INTRAMUSCULAR; INTRAVENOUS
Status: ACTIVE | OUTPATIENT
Start: 2023-10-16 | End: 2023-10-17

## 2023-10-16 RX ORDER — HYDROXYZINE HYDROCHLORIDE 25 MG/1
25 TABLET, FILM COATED ORAL
Status: DISCONTINUED | OUTPATIENT
Start: 2023-10-16 | End: 2023-10-18

## 2023-10-16 RX ADMIN — TRIAMCINOLONE ACETONIDE: 1 OINTMENT TOPICAL at 21:17

## 2023-10-16 RX ADMIN — FAMOTIDINE 20 MG: 10 INJECTION, SOLUTION INTRAVENOUS at 06:13

## 2023-10-16 RX ADMIN — POLYETHYLENE GLYCOL 3350 17 G: 17 POWDER, FOR SOLUTION ORAL at 08:02

## 2023-10-16 RX ADMIN — ACETAMINOPHEN 975 MG: 325 TABLET, FILM COATED ORAL at 16:34

## 2023-10-16 RX ADMIN — ACETAMINOPHEN 975 MG: 325 TABLET, FILM COATED ORAL at 02:45

## 2023-10-16 RX ADMIN — HEPARIN SODIUM 1900 UNITS/HR: 10000 INJECTION, SOLUTION INTRAVENOUS at 21:40

## 2023-10-16 RX ADMIN — NYSTATIN 500000 UNITS: 100000 SUSPENSION ORAL at 11:40

## 2023-10-16 RX ADMIN — FENTANYL CITRATE 100 MCG: 50 INJECTION, SOLUTION INTRAMUSCULAR; INTRAVENOUS at 15:40

## 2023-10-16 RX ADMIN — SODIUM CHLORIDE 250 ML: 9 INJECTION, SOLUTION INTRAVENOUS at 11:49

## 2023-10-16 RX ADMIN — NYSTATIN 500000 UNITS: 100000 SUSPENSION ORAL at 21:16

## 2023-10-16 RX ADMIN — PANTOPRAZOLE SODIUM 40 MG: 40 INJECTION, POWDER, FOR SOLUTION INTRAVENOUS at 08:02

## 2023-10-16 RX ADMIN — HEPARIN SODIUM 1900 UNITS/HR: 10000 INJECTION, SOLUTION INTRAVENOUS at 10:52

## 2023-10-16 RX ADMIN — ALTEPLASE 2 MG: 2.2 INJECTION, POWDER, LYOPHILIZED, FOR SOLUTION INTRAVENOUS at 12:57

## 2023-10-16 RX ADMIN — INSULIN ASPART 1 UNITS: 100 INJECTION, SOLUTION INTRAVENOUS; SUBCUTANEOUS at 08:12

## 2023-10-16 RX ADMIN — SENNOSIDES AND DOCUSATE SODIUM 1 TABLET: 50; 8.6 TABLET ORAL at 21:15

## 2023-10-16 RX ADMIN — OLIVE OIL AND SOYBEAN OIL 250 ML: 16; 4 INJECTION, EMULSION INTRAVENOUS at 23:01

## 2023-10-16 RX ADMIN — MIDAZOLAM 3 MG: 1 INJECTION INTRAMUSCULAR; INTRAVENOUS at 15:41

## 2023-10-16 RX ADMIN — INSULIN ASPART 1 UNITS: 100 INJECTION, SOLUTION INTRAVENOUS; SUBCUTANEOUS at 11:07

## 2023-10-16 RX ADMIN — TRIAMCINOLONE ACETONIDE: 1 CREAM TOPICAL at 11:40

## 2023-10-16 RX ADMIN — NYSTATIN 500000 UNITS: 100000 SUSPENSION ORAL at 16:33

## 2023-10-16 RX ADMIN — ACETAMINOPHEN 975 MG: 325 TABLET, FILM COATED ORAL at 08:02

## 2023-10-16 RX ADMIN — INSULIN ASPART 1 UNITS: 100 INJECTION, SOLUTION INTRAVENOUS; SUBCUTANEOUS at 16:33

## 2023-10-16 RX ADMIN — LEVOFLOXACIN 750 MG: 5 INJECTION, SOLUTION INTRAVENOUS at 16:39

## 2023-10-16 RX ADMIN — SODIUM CHLORIDE 300 ML: 9 INJECTION, SOLUTION INTRAVENOUS at 11:49

## 2023-10-16 RX ADMIN — NOREPINEPHRINE BITARTRATE 0.03 MCG/KG/MIN: 0.06 INJECTION, SOLUTION INTRAVENOUS at 15:48

## 2023-10-16 RX ADMIN — ALTEPLASE 2 MG: 2.2 INJECTION, POWDER, LYOPHILIZED, FOR SOLUTION INTRAVENOUS at 14:00

## 2023-10-16 RX ADMIN — Medication: at 11:50

## 2023-10-16 RX ADMIN — Medication 5 MG: at 21:15

## 2023-10-16 RX ADMIN — CHLORHEXIDINE GLUCONATE 15 ML: 1.2 RINSE ORAL at 08:02

## 2023-10-16 RX ADMIN — NYSTATIN 500000 UNITS: 100000 SUSPENSION ORAL at 08:06

## 2023-10-16 RX ADMIN — HYDROCORTISONE: 25 CREAM TOPICAL at 21:17

## 2023-10-16 RX ADMIN — ATORVASTATIN CALCIUM 10 MG: 10 TABLET, FILM COATED ORAL at 21:15

## 2023-10-16 RX ADMIN — MIDODRINE HYDROCHLORIDE 20 MG: 5 TABLET ORAL at 11:52

## 2023-10-16 RX ADMIN — ACETAMINOPHEN 975 MG: 325 TABLET, FILM COATED ORAL at 21:15

## 2023-10-16 RX ADMIN — SENNOSIDES AND DOCUSATE SODIUM 1 TABLET: 50; 8.6 TABLET ORAL at 08:02

## 2023-10-16 RX ADMIN — ALTEPLASE 2 MG: 2.2 INJECTION, POWDER, LYOPHILIZED, FOR SOLUTION INTRAVENOUS at 12:58

## 2023-10-16 RX ADMIN — OXYCODONE HYDROCHLORIDE 10 MG: 5 TABLET ORAL at 23:00

## 2023-10-16 RX ADMIN — GABAPENTIN 300 MG: 300 CAPSULE ORAL at 21:15

## 2023-10-16 RX ADMIN — CHLORHEXIDINE GLUCONATE 15 ML: 1.2 RINSE ORAL at 21:15

## 2023-10-16 RX ADMIN — MAGNESIUM SULFATE HEPTAHYDRATE: 500 INJECTION, SOLUTION INTRAMUSCULAR; INTRAVENOUS at 21:19

## 2023-10-16 RX ADMIN — DEXMEDETOMIDINE HYDROCHLORIDE 0.4 MCG/KG/HR: 400 INJECTION INTRAVENOUS at 09:59

## 2023-10-16 ASSESSMENT — ACTIVITIES OF DAILY LIVING (ADL)
ADLS_ACUITY_SCORE: 45

## 2023-10-16 ASSESSMENT — LIFESTYLE VARIABLES: TOBACCO_USE: 1

## 2023-10-16 NOTE — PROGRESS NOTES
Unfortunately Mr. Mcgraw had an eventful weekend, requiring reintubation Friday evening.  CT scan at that time demonstrated new pancreatitis in the supracolic compartment, which was not accessed surgically, likely representing ischemic causes which would not be unexpected given his presentation and severe hypotension.  GI has not felt this needed to be drained.  This is quite reasonable given that it is away from the aortic reconstruction for the time being.  We will monitor closely.  He did have a left iliacus hematoma.  He is not showing us evidence of active bleeding although this was not present during the last inspection of the retroperitoneum during his abdominal closure.  Possible spontaneous psoas hematoma with ongoing anticoagulation for his pulmonary emboli.  Overall his hemodynamics are actually better this morning he is less tachycardic.  Not hypotensive.  His NG tube output is decreasing.  Our critical care colleagues had previously wanted to pursue ADDIS for embolic work-up given evidence of bilateral emboli not attributable to his surgical procedure or location of wire manipulation.  I do think this is reasonable and could be done today.  We we will plan for above-knee amputation tomorrow as long as he remains in similar condition.  I will try to touch base with the family today although did not see them on rounds midday.

## 2023-10-16 NOTE — PROGRESS NOTES
"Pain Service Brief Note  [facility]  Date: 10/16/2023       Patient Name: Alfredo Burnham  MRN: [id number]  Age: 68 year old  Sex: male    I discussed this patient's plan of care with SICU and Regional anesthesia teams today. Alfredo was re-intubated in the evening on 10/13 in the setting of acute hypoxic respiratory failure after dialysis. He remains intubated today, and, per SICU, they do not currently anticipate extubation today. Regional anesthesia team would not currently recommend placement of perineural catheters, weighing low benefit of indwelling catheters in a patient who will remain intubated & sedated against risks (infection, bleeding complications, limited neurological exam to detect complications).     SICU team will re-contact us if they would like us to re-evaluate for regional anesthesia as his course evolves.    Of note, he has also had a downtrending Hb with iliacus muscle hematoma on imaging. Acknowledge that he is on heparin infusion, and would also consider further evaluation for coagulopathy prior to any future regional anesthesia procedure.     Primary Service Contacted with Recommendations? Yes    Acute Inpatient Pain Service [facility]  Hours of pain coverage 24/7   Page via Amcom- Please Page the Pain Team Via Amcom: \"PAIN MANAGEMENT ACUTE INPATIENT/ [facility]\"             "

## 2023-10-16 NOTE — PROGRESS NOTES
[facility]  WOC Nurse Inpatient Assessment     Consulted for: right heel PI    Patient History (according to provider note(s):      Alfredo Burnham is a 68 year old male who was admitted to the SICU on 9/24/2023 s/p open AAA repair. Patient has a history of HTN and previous TIA. He presented to the ED on 9/24 with right sided abdominal pain and was found to have a contained, ruptured AAA on CT. 30 min super-celiac clamp time. Prolonged intra-renal clamp. 9/25 s/p flex sig showing rectal ischemia, abdominal washout showing a hemostatic AAA graft and no evidence of transmural colonic or small bowel ischemia, and an unsuccessful LLE embolectomy. 9/26 s/p repeat abdominal washout, retroperitoneal closure, LLE wound washout. 9/29 s/p repeat abd washout, bowel appeared well perfused w/o notable ischemia. OR 10/2 for abdominal fascia closure. Extubated 10/4. Unequal pupils and R facial droop, MRI brain showed multiple small infarcts, likely embolic.     Assessment:      Areas visualized during today's visit: Focused: right heel    Pressure Injury Location: right heel     Last photo: 10/9  Wound type: Pressure Injury     Pressure Injury Stage: Deep Tissue Pressure Injury (DTPI), hospital acquired   Wound history/plan of care:   found by bedside RN 10/7  Wound base: 100 % resolved, intact epidermis    My PI Risk Assessment     Sensory Perception: 2 - Very Limited     Moisture: 3 - Occasionally moist      Activity: 1 - Bedfast      Mobility: 1 - Completely immobile      Nutrition: 3 - Adequate     Friction/Shear: 1 - Problem     TOTAL: 11     Treatment Plan:     Right heel wound(s): Offload the area per routine using pillow    Orders: Reviewed    RECOMMEND PRIMARY TEAM ORDER: None, at this time  Education provided: plan of care and wound progress  Discussed plan of care with: Family and Nurse  WOC nurse follow-up plan: signing off  Notify WOC if wound(s) deteriorate.  Nursing  to notify the Provider(s) and re-consult the [facility] Nurse if new skin concern.    DATA:     Current support surface: Standard  Low air loss (FARSHAD pump, Isolibrium, Pulsate, skin guard, etc)  Containment of urine/stool: Incontinent pad in bed  BMI: Body mass index is 26.45 kg/m .   Active diet order: Orders Placed This Encounter      NPO for Medical/Clinical Reasons Except for: No Exceptions     Output: I/O last 3 completed shifts:  In: 2416.56 [I.V.:807.23; NG/GT:150]  Out: 300 [Emesis/NG output:300]     Labs:   Recent Labs   Lab 10/16/23  0431 10/13/23  2027 10/13/23  0320   ALBUMIN 2.5*   < > 2.6*   PREALB  --   --  15   HGB 7.8*   < > 6.9*   INR 1.32*   < > 1.40*   WBC 13.5*   < > 13.4*    < > = values in this interval not displayed.     Pressure injury risk assessment:   Sensory Perception: 3-->slightly limited  Moisture: 2-->very moist  Activity: 1-->bedfast  Mobility: 2-->very limited  Nutrition: 2-->probably inadequate  Friction and Shear: 2-->potential problem  Edson Score: 12      Pager no longer is use, please contact through Vocera   Vocera group: [facility] Nurse [place]  Dept. Office Number: [phone]

## 2023-10-16 NOTE — PROGRESS NOTES
Dialysis Note:    TX run for 30min, Machine alarmed for arterial pressure, internal jugular flushed,  line reversed, PT reposition with no success  Received order for activase. Activase for 20min without succusses.  Bebeto CASE was notified, primary team was notified by ARIE

## 2023-10-16 NOTE — PLAN OF CARE
Major Shift Events: Drowsy; able to follow simple commands; VSS; tele NSR/tach with intermittent PAC's PVC's; tolerating pressure support 5/5, switched back to VC-AC settings forr ADDIS; ADDIS unremarkable; no BM today, NG to start trickle feeds, then stop @ midnight; anuric, able to do HD today; TPN lipids running; heparin therapeutic @ 1900units (stop @ 0400 for surgery), precedex @ 0.6    Plan: AKA tomorrow    For vital signs and complete assessments, please see documentation flowsheets.      Marek Nolan RN, BSN

## 2023-10-16 NOTE — PROGRESS NOTES
Nephrology Progress Note  10/16/2023       Alfredo Burnham is a 68 yom with complex hx of TIA, HTN, blindness who presented to [facility] 9/24 with severe abdominal pain radiating to flank and back, CT revealed AAA with a contained rupture.  Taken to OR for aortobiiliac bypass and resection of ruptured pararenal aneurysm.   Post op course complicated by hypotension requiring pressors and metabolic acidosis.  Baseline Cr 1.0 on presentation but on the rise to >5 at time of renal consult for MAYA management and possible RRT.       Interval History :   Mr Burnham had CRRT stopped 10/4, generally has been stable on iHD since then but had resp arrest immediately post HD run on 10/13 requiring return to ICU and intubation.  Concern is for aspiration PNA vs PE, vent mechanics are improving but keeping vent/ETT as he is scheduled for surgery tomorrow.  Line not working well today, requesting team re-wire to facilitate a run today.   CTA 10/13 showed scattered infarcts which lowers chances of long term recovery so will request tunneled line for longer term HD.      Assessment & Recommendations:   AMYA-Baseline Cr 1.0, ordered UA.  CT showed benign cyst but otherwise normal kidneys.  Cr on the rise since surgery, did receive contrast for CTA.  Urine microscopy showed granular casts suggesting ATN which will recover with time and stabilizing hemodynamics.  Started on CRRT 9/30 for volume and clearance with minimal UOP and rising Cr.                  -Started CRRT 9/30 for volume and clearance, stopped 10/4 and now running iHD PRN (generally on MWF) and watching for recovery.        -Line is RIJ temp line from 10/6                 -Dialysis consent signed and scanned into media tab.      Volume-Wt on downtrend overall but up the past 2 days without HD, BP's reasonable overnight and has some edema so will plan to pull 2-3L to account for gains the past ~48h.      Electrolytes-K 4.2, bicarb 15 with high AG/negative lactate, likely  "due to renal failure with VBG 7.33/32/63/17, running HD today.        BMD-Ca 8.5, Mg 2.7.  Phos noted to be high at 7.7, would use binder but we have stopped TF and starting TPN so binder would not be effective.  No phos in TPN.       Anemia-Hgb 7.8, ~14 on presentation, acute management per team.       Nutrition-On TPN, no phos in TPN formula.       Time spent: 40 minutes on this date of encounter for chart review, physical exam, medical decision making and co-ordination of care.      Seen and discussed with Dr Mccauley     Recommendations were communicated to primary team via verbal communication.        ALTAGRACIA Jiménez CNS  Clinical Nurse Specialist  [phone]    Review of Systems:   I reviewed the following systems:  ROS not done due to lethargy    Physical Exam:   I/O last 3 completed shifts:  In: 2416.56 [I.V.:807.23; NG/GT:150]  Out: 300 [Emesis/NG output:300]   /69 (BP Location: Left arm)   Pulse 98   Temp 99.5  F (37.5  C) (Axillary)   Resp 18   Ht 1.727 m (5' 8\")   Wt 78.9 kg (173 lb 15.1 oz)   SpO2 99%   BMI 26.45 kg/m       GENERAL APPEARANCE: Extubated, lethargic, in no distress.   EYES: No scleral icterus  Pulmonary: On vent 40%/8 of PEEP  CV: Regular rhythm, normal rate   - Edema +1-2 generalized, + scrotal edema.    GI: distended, nontender  MS: no evidence of inflammation in joints, no muscle tenderness  : No Lu  SKIN: no rash, warm, dry  NEURO: No focal deficits.         Labs:   All labs reviewed by me  Electrolytes/Renal -   Recent Labs   Lab Test 10/16/23  0811 10/16/23  0431 10/15/23  0359 10/15/23  0353 10/14/23  0400 10/14/23  0349   NA  --  131*  --  136  --  137   POTASSIUM  --  4.2  --  4.0  --  4.0   CHLORIDE  --  91*  --  96*  --  97*   CO2  --  15*  --  21*  --  26   BUN  --  142.0*  --  95.8*  --  59.6*   CR  --  7.20*  --  5.77*  --  4.19*   * 127*  135*   < > 123*   < > 150*   OMAR  --  8.5*  --  8.6*  --  8.2*   MAG  --  2.7*  --  2.6*  --  1.8   PHOS  -- "  7.7*  --  7.1*  --  6.2*    < > = values in this interval not displayed.       CBC -   Recent Labs   Lab Test 10/16/23  0431 10/15/23  2353 10/15/23  1752 10/15/23  1358 10/15/23  0353 10/14/23  1426   WBC 13.5*  --   --   --  13.8* 16.5*   HGB 7.8* 7.7* 7.8*   < > 7.3* 7.8*   *  --   --   --  142* 162    < > = values in this interval not displayed.       LFTs -   Recent Labs   Lab Test 10/16/23  0431 10/15/23  0353 10/14/23  0349   ALKPHOS 82 98 82   BILITOTAL 0.4 0.4 0.4   ALT 33 34 39   AST 59* 65* 66*   PROTTOTAL 5.4* 5.5* 5.8*   ALBUMIN 2.5* 2.5* 3.1*       Iron Panel - No lab results found.        Current Medications:   acetaminophen  975 mg Oral or Feeding Tube Q6H    atorvastatin  10 mg Oral or Feeding Tube QPM    ceFEPIme  1 g Intravenous Q24H    chlorhexidine  15 mL Mouth/Throat Q12H    famotidine  10 mg Intravenous QPM    gabapentin  300 mg Oral or Feeding Tube At Bedtime    insulin aspart  1-12 Units Subcutaneous Q4H    lipids plant base  250 mL Intravenous Q24H    melatonin  5 mg Oral or Feeding Tube QPM    nystatin  500,000 Units Oral 4x Daily    pantoprazole  40 mg Per Feeding Tube QAM AC    Or    pantoprazole  40 mg Intravenous QAM AC    polyethylene glycol  17 g Oral or Feeding Tube Daily    QUEtiapine  12.5-25 mg Oral or Feeding Tube At Bedtime    senna-docusate  1 tablet Oral or Feeding Tube BID    sodium chloride (PF)  3 mL Intravenous Q8H      dexmedeTOMIDine 0.4 mcg/kg/hr (10/16/23 0800)    dextrose      dextrose      dextrose      heparin Stopped (10/16/23 0823)    norepinephrine Stopped (10/14/23 0000)    parenteral nutrition - ADULT compounded formula 50 mL/hr at 10/16/23 0800

## 2023-10-16 NOTE — PROGRESS NOTES
SURGICAL ICU PROGRESS NOTE  10/16/2023        Date of Service (when I saw the patient): 10/16/2023    ASSESSMENT:  Alfredo Burnham is a 68 year old male who was admitted to the SICU on 9/24/2023 s/p open AAA repair. Patient has a history of HTN and previous TIA. He presented to the ED on 9/24 with right sided abdominal pain and was found to have a contained, ruptured AAA on CT. 30 min super-celiac clamp time. Prolonged intra-renal clamp. 9/25 s/p flex sig showing rectal ischemia, abdominal washout showing a hemostatic AAA graft and no evidence of transmural colonic or small bowel ischemia, and an unsuccessful LLE embolectomy. 9/26 s/p repeat abdominal washout, retroperitoneal closure, LLE wound washout. 9/29 s/p repeat abd washout, bowel appeared well perfused w/o notable ischemia. OR 10/2 for abdominal fascia closure. Extubated 10/4. Unequal pupils and R facial droop, MRI brain showed multiple small infarcts, likely embolic. Patient was transferred out of the surgical ICU on 10/12. On 10/13, patient completed dialysis run and suddenly started experiencing desaturation. Patient was emergently intubated for Acute Hypoxic Respiratory Failure in dialysis unit and transferred to SICU for further cares.         CHANGES and MAJOR THINGS TODAY:   - Derm labs (daily CBC with diff, LFTs daily, triamcinolone 0.1% ointment BID, monitor closely, consider peripheral smear to assess for reactive lymphocytes)  - ADDIS today  - CXR  - NJ placement following ADDIS; can start trickle feeds until midnight when they should be held  - Discuss continuation of cefepime    PLAN:    Neurological:  # Sedation  - Precedex 0.4  # Acute pain  RAPS felt that a nerve block prior to L AKA while pt is intubated and sedated is unnecessary; will consider reaching out again when he is close to extubating post-op.   - Multi-modal pain control effective (scheduled tylenol, gabapentin TID, oxycodone PRN, dilaudid PRN, robaxin PRN)     #Facial droop,  left lacunar infarct  #Punctate lesions of cerebellum   - Follow-up with stroke neurology 4-6 weeks after discharge  - ADDSI today assess for embolic source     # Delirium  # Mixed metabolic encephalopathy   # Sedation, RASS goal 0 to -1  - Monitor neurological status. Delirium preventions and precautions  - Melatonin q6pm and seroquel for sleep aid  - Atarax PRN for anxiety      Pulmonary:  # Acute hypoxic respiratory failure  # Re-intubation 10/13 (after prev extubation 10/3)  # Pulmonary embolism   Satting well overnight, %.   - Mechanical ventilation  / FiO2 40% / RR 14 / PEEP 5  - Work on weaning ventilation as tolerated  - Continue PSTs     Cardiovascular:    # Contained AAA rupture s/p open repair 9/25  # Acute critical limb ischaemia of LLE  # Hx of HTN  # Tachycardia  # L iliacus hematoma  # Peripancreatic hematoma   Hgb at 7.8 after a unit  of pRBCs yesterday.   - Continue to monitor hemodynamic status; Goal MAP >65, SBP <160  - Ischaemic LLE, L AKA Tuesday 10/17  - High dose heparin for PE (hold at 4AM pre-op)  - Continue Lipitor 10 mg     GI/Nutrition:    # s/p open AAA repair, wound vac device in place  # Post-operative melena, resolved  # Rectal ischemia, concern for, resolved  # Aspiration event 10/9  NG output has decreased- 225 yesterday and 100 since midnight  - Strict NPO  - NGT to LIS  - BR with senna BID and miralax daily  - continue TPN for nutrition  - Will plan to start NJ and begin trickle feeds after the ADDIS  - Will plan to pause Tfs at 0000 tonight pre-op  - Monitor electrolytes     Renal/Fluids/Electrolytes:  # Protein calorie deficit malnutrition, risk of   # Acute kidney injury  # Oliguria  # Haemodialysis  - No IVF  - dialysis per nephrology; last iHD 10/13  - Nephrology following- likely will dialyze today  - Strict intake and output     Endocrine:  # Stress hyperglycaemia  - High SSI for glucose control  - q6h BG checks     Infectious disease:   # oropharyngeal  candidiasis  # Likely aspiration event during ADDIS  White down to 13.5. Sputum culture unable to be collected thus far due to weak cough.  - Discuss continuation of cefepime vs switching to cipro in the setting of diffuse rash (10/14- )  - Begin ciprofloxacin (switching abx in setting of rash)  - Nystatin suspension      Hematology:    # Acute blood loss anemia  Hgb 7.8 this morning.   - INR 1.32  - Continue heparin gtt; hold at midnight      MSK:  # Weakness and deconditioning of critical illness  # Left lower limb ischemia   - Passive ROM at bedside with RN  Marisol WINSLOW Tuesday 10/17; block prior to surgery with RAPS  - Physical therapy when possible  - Podiatry consult for nail clipping      Skin:  # Diffuse blanching maculopapular rash  Rash present since ~10/13 on abdomen, trunk, thighs. No medications per pharm thought to be the cause. Derm evaluated and felt it was a morbilliform eruption vs less likely DRESS syndrome.   - CBC with differential daily  - LFTs daily  - Start triamcinolone 0.1% ointment twice daily to all body areas with rash  - Monitor closely for progression or change in skin findings.  - Consider peripheral smear to assess for reactive lymphocytes    General Cares/Prophylaxis:    DVT Prophylaxis: Pneumatic Compression Devices and heparin gtt  GI Prophylaxis: PPI  Restraints: Restraints for medical healing needed: YES     Lines/ tubes/ drains:  - ETT  - Right internal jugular double lumen  - Left tripple lumen suvbclavian central line   - NGT  - PIVs in RUE  - Abdominal wound vac  - LLE wound vac    Disposition:  - Surgical ICU     Patient seen, findings and plan discussed with surgical ICU staff, Dr. Mccall.    Jenni Mora, MS4  Surgical ICU    ====================================  INTERVAL HISTORY:   No acute events overnight. Opens eyes to command and moving upper extremities up toward head occasionally. Occasionally nods in response to questions.     ROS unable to be performed due to  sedation.       OBJECTIVE:   1. VITAL SIGNS:   Temp:  [98.3  F (36.8  C)-100.3  F (37.9  C)] 98.3  F (36.8  C)  Pulse:  [] 86  Resp:  [16-20] 18  BP: ()/(52-76) 83/60  FiO2 (%):  [40 %] 40 %  SpO2:  [97 %-100 %] 97 %  Vent Mode: CMV/AC  (Continuous Mandatory Ventilation/ Assist Control)  FiO2 (%): 40 %  Resp Rate (Set): 14 breaths/min  Tidal Volume (Set, mL): 450 mL  PEEP (cm H2O): 5 cmH2O  Pressure Support (cm H2O): 7 cmH2O  Resp: 18      2. INTAKE/ OUTPUT:   I/O last 3 completed shifts:  In: 2390.76 [I.V.:841.43; NG/GT:90]  Out: 325 [Emesis/NG output:225; Drains:100]    3. PHYSICAL EXAMINATION:  General: intubated, sedated          HEENT: normocephalic,atraumatic  Neuro: sedated on propofol, occasionally opens eyes, moving upper extremities spontaneously   Pulm/Resp: intubated, 450 / 40% / 14 / 5    CV: tachycardic  Abdomen: Abdomen mildly distended. Midline laparotomy incision with wound vac in place with serosanguinous output in canister.  abdominal binder not in place. Blood collection under abdominal wound vac dressing.   Gu: decreased scrotal swelling.  MSK/Extremities: Ischaemic LLE, wound vac in place, scant dark brown output in canister. No peripheral edema. RLE with slightly dusky fifth, small toe, rest of RLE normal in appearance. R DP pulse intact. BUE with palpable radial pulses.     4. INVESTIGATIONS:   Arterial Blood Gases   Recent Labs   Lab 10/14/23  0111   PH 7.41  7.41   PCO2 41  41   PO2 85  85   HCO3 26  26     Complete Blood Count   Recent Labs   Lab 10/16/23  0431 10/15/23  2353 10/15/23  1752 10/15/23  1358 10/15/23  0353 10/14/23  1426 10/14/23  1050   WBC 13.5*  --   --   --  13.8* 16.5* 16.7*   HGB 7.8* 7.7* 7.8* 7.0* 7.3* 7.8* 7.8*   *  --   --   --  142* 162 157     Basic Metabolic Panel  Recent Labs   Lab 10/16/23  0431 10/15/23  2315 10/15/23  2011 10/15/23  0359 10/15/23  0353 10/14/23  0400 10/14/23  0349 10/13/23  2350 10/13/23  2130   *  --   --   --   136  --  137  --  140   POTASSIUM 4.2  --   --   --  4.0  --  4.0  --  4.1   CHLORIDE 91*  --   --   --  96*  --  97*  --  102   CO2 15*  --   --   --  21*  --  26  --  24   .0*  --   --   --  95.8*  --  59.6*  --  42.1*   CR 7.20*  --   --   --  5.77*  --  4.19*  --  3.24*   *  135* 143* 122*   < > 123*   < > 150*   < > 145*    < > = values in this interval not displayed.     Liver Function Tests  Recent Labs   Lab 10/16/23  0431 10/15/23  0353 10/14/23  0349 10/13/23  2130 10/13/23  2034   AST 59* 65* 66* 89*  --    ALT 33 34 39 47  --    ALKPHOS 82 98 82 115  --    BILITOTAL 0.4 0.4 0.4 0.5  --    ALBUMIN 2.5* 2.5* 3.1* 3.2*  --    INR 1.32* 1.32*  --  1.20* 1.18*     Pancreatic Enzymes  Recent Labs   Lab 10/14/23  1017   LIPASE 51     Coagulation Profile  Recent Labs   Lab 10/16/23  0431 10/15/23  0353 10/13/23  2130 10/13/23  2034   INR 1.32* 1.32* 1.20* 1.18*   PTT  --  >240* 125* 121*         5. RADIOLOGY:   No results found for this or any previous visit (from the past 24 hour(s)).    =========================================    I saw and evaluated the patient in an independent visit and agree with the student's assessment and plan as written above. I was present at all times for any mentioned procedures.     Clint Braga MD  PGY-1, Neurosurgery  Off-service on SICU

## 2023-10-16 NOTE — PROGRESS NOTES
Vascular Surgery Progress Note  10/16/2023       Subjective:  Night uneventful. Doing well this am, HR lower than prior, no fevers. Moving BUE, RLE.     Objective:  Temp:  [98.3  F (36.8  C)-100.3  F (37.9  C)] 99.5  F (37.5  C)  Pulse:  [] 98  Resp:  [16-18] 18  BP: ()/(60-76) 113/69  FiO2 (%):  [40 %] 40 %  SpO2:  [97 %-100 %] 99 %    I/O last 3 completed shifts:  In: 2416.56 [I.V.:807.23; NG/GT:150]  Out: 300 [Emesis/NG output:300]      Gen: resting comfortably in bed in ICU, sedated, intubated.  CV: RR per DP pulse, off pressors, regular HR per tele   Resp: intubated, on minimal vent settings  Abd: Wound vac in place, holding seal, abd binder on  Ext: RLE wwp, palpable DP pulse,  LLE cool to touch, dusky. Ischemic changes appreciated from knee to foot.     Labs:  Recent Labs   Lab 10/16/23  0431 10/15/23  2353 10/15/23  1752 10/15/23  1358 10/15/23  0353 10/14/23  1426   WBC 13.5*  --   --   --  13.8* 16.5*   HGB 7.8* 7.7* 7.8*   < > 7.3* 7.8*   *  --   --   --  142* 162    < > = values in this interval not displayed.       Recent Labs   Lab 10/16/23  0811 10/16/23  0431 10/15/23  0359 10/15/23  0353 10/14/23  0400 10/14/23  0349   NA  --  131*  --  136  --  137   POTASSIUM  --  4.2  --  4.0  --  4.0   CHLORIDE  --  91*  --  96*  --  97*   CO2  --  15*  --  21*  --  26   BUN  --  142.0*  --  95.8*  --  59.6*   CR  --  7.20*  --  5.77*  --  4.19*   * 127*  135*   < > 123*   < > 150*   OMAR  --  8.5*  --  8.6*  --  8.2*   MAG  --  2.7*  --  2.6*  --  1.8   PHOS  --  7.7*  --  7.1*  --  6.2*    < > = values in this interval not displayed.      Imaging  Narrative & Impression   EXAMINATION: CT CHEST/ABDOMEN/PELVIS W CONTRAST, 10/13/2023 11:33 PM     COMPARISON STUDY: Abdominal radiograph 10/10/2023     INDICATION: new desat episode, hypotension, eval ? infectious source     TECHNIQUE: CT scan of the chest, abdomen and pelvis was performed on  multidetector CT scanner using volumetric  acquisition technique and  images were reconstructed in multiple planes with variable thickness  and reviewed on dedicated workstations.      CONTRAST: iopamidol (ISOVUE-370) solution 104 m. Without oral  contrast.     CT scan radiation dose is optimized to minimum requisite dose using  automated dose modulation techniques.     Findings:      Chest:  Lungs: Bilateral small-to-moderate pleural effusions with associated  atelectasis and ground glass opacities, left greater than right.  Basilar predominant interlobular septal thickening. No pneumothorax.     Mediastinum: Heart size is within normal limits. No pericardial  effusion. Aorta and main pulmonary artery caliber are within normal  limits No mediastinal lymphadenopathy. Small volume of air within the  left brachiocephalic vein (series 5, image 33) just prior to entering  the superior vena cava, likely iatrogenic secondary left upper  extremity central venous catheter. Enteric tube coursing the esophagus  terminating in the stomach lumen, otherwise the esophagus is  unremarkable.         Abdomen/Pelvis:    Liver: No mass. Mild intrahepatic biliary dilatation. Hypodensity at  the lateral aspect of the right hepatic lobe measuring up to 11 mm  (series 5, image 103).      Gallbladder/CBD: Mildly distended gallbladder with layering sludge.  Common bile duct within normal limits for patient's age.     Pancreas: Pancreas appears to be normal enhancing with peripancreatic  hypoattenuating collection this primarily hypoattenuating but with  areas of increased hyperattenuation. This suggests peripancreatic  fluid collection with hemorrhage.     Spleen: Surrounded by hypoattenuating fluid, otherwise unremarkable.     Adrenal glands: Normal.     Kidneys/ureters/bladder: Heterogeneous hypoattenuation of the kidneys  bilaterally with wedgelike confluent hypoattenuation in the interpolar  and superior pole of the left kidney (series series 5, images  127-156). No obstructing  renal stone, hydronephrosis, renal masses.      Genitourinary/reproductive tract: Normal bladder. Reproductive organs  are within normal limits.     Gastrointestinal: Hypoattenuating of the large bowel with wall  thickening starting from the rectum and extending to the mid  transverse colon with surrounding fat stranding (160-257). Colonic  diverticulosis. Normal caliber small bowel. Appendix not seen. Stomach  and esophagus are unremarkable.     Vasculature: Postsurgical changes of ruptured AAA open repair with  mixed attenuation clot surrounding the postsurgical repairs. Narrow  lumen inferior vena cava compressed by hematoma at the level of the  kidneys. Small hematoma at the aortic bifurcation. Patent hepatic  portal vein.     Retroperitoneum/peritoneum/mesentery: Ascites throughout the  peritoneal cavity surrounding the liver or spleen, infrarenal aorta  and layering in the pelvis. 2 cm hematoma at the aortic bifurcation. 2  cm hematoma just below the level of the renal veins.     Bones: No acute or aggressive appearing osseous bodies. Ultimately  degenerative changes of the spine.     Soft tissues: Enlarged left iliacus muscle with hypodense  heterogeneous appearance measuring up to 6.6 cm (series 5, image 224).  Diffuse anasarca.                                                                      IMPRESSION:   1.  Enlarged heterogeneously hypoattenuating left illiacus muscle  measuring up to 6.6 cm. Findings may represent developing iliacus  hematoma. Consider CTA abdomen/pelvis to further evaluate for active  bleeding within the hematoma.  2.  Narrow caliber infrahepatic IVC with appearance of compression in  between thrombus secondary to AAA rupture and abdominal aorta;  findings may represent both hypotension and potential compression  secondary to the surrounding hematoma.   3.  Bilateral, left greater than right, wedge-shaped hypodensities  suggesting infarcts.   4.  Edematous left colon. There appears  to be mucosal enhancement. No  evidence of pneumatosis intestinalis or free air in the abdomen.  5.  Pancreas appears perfused but there is peripancreatic fluid with  hemorrhage. No pseudoaneurysm or extravasation identified.   6.  Diffuse intraperitoneal ascites and mixed attenuating hematoma,  along the right inferior renal aorta and aortic bifurcation, likely  related to recent AAA rupture and subsequent repair.  7.  Bilateral pleural effusions with associated atelectasis and  groundglass opacities, likely representing pulmonary edema.  8.  Diffuse anasarca.              [Urgent Result: Suspected left iliac is hematoma measuring up 6.6 cm;  suspected devascularized interpolar and superior left kidney.]     Finding was identified on 10/14/2023 12:09 PM.        Assessment/Plan:   68 year old male with PMH of HTN and previous TIA who presented with R sided abdominal pain found to have contained ruptured AAA, now s/p open AAA repair 9/24 into 9/25am with 30 min supraceliac clamp time, prolonged inter-renal clamp time, temporary abdominal closure. He did have bloody stool postop with flex sig 9/25 demonstrating ischemic mucosal changes of the rectum, however on repeated abdominal looks he has had no evidence of transmural necrosis of the small or large intestine, or the rectum. On 9/29 he was started on CRRT given ongoing low UOP and rising Cr and failure of lasix challenge. Now on iHD. Otherwise hemodynamically continues to do well off pressors. Ischemic LLE unchanged, note DVT in this leg, this is to be expected given ischemia and absent inflow. We will continue to monitor LLE given it is not currently making patient systemically ill and there is no indication for urgent intervention. S/p closure of abdominal fascia and subcutaneous tissue left open with wound VAC applied 10/2/23. Left AKA on hold at this time, as leukocytosis improves and supplemental oxygen requirement downtrend. Continues to have large NGT  output, high risk for aspiration and further worsening infectious process. Desaturation evening 10/13 requiring reintubation, transfer back to SICU. CT completed which demonstrated peripancreatic fluid collection with concern for hemorrhage, as well as L iliacus muscle hematoma. Hemoglobin stable today. Plan for ADDIS today tentatively. On OR schedule for 10/17 for AKA.     Neuro:   - sedation per SICU  - appreciate sedation vacation and assessment neuro status  - Stroke workup negative -- MRI with multiple small infarcts, follow-up with neurology 4-6 weeks after discharge, ok with a/c.   - ADDIS for workup needs to be done - plan for 10/16 tentatively,  no clear embolic source noted on TTE.  CV:   - Off pressors, midodrine 20mg daily prn with HD runs   - Goal SBP <160, MAP >65  - labetalol prn for SBP >160  - continue statin  - PE+, venous duplex with nonocclusive to occlusive thrombus of the left GSV from the level of the knee to the groin and nonocclusive thrombus of the left distal femoral vein and popliteal veins, increased in extent compared to 9/30/2023.   - Continue with heparin gtt.  - ADDIS Monday 10/16 given embolic multiple small infarcts on stroke ochoa.   Resp:   - No new acute concerns, minimal vent settings  - vent settings per SICU, wean as tolerated.   GI:   - Pancreatitis with peripancreatic fluid collection on CT with question of bleeding into the collection.  - Do not expect this anterior location of pancreatic fluid to be related to surgical procedure as we were in the RP and did see inferior/posterior aspect of pancreas, but would expect if it were to be surgery related it would be more posterior.   - Appreciate GI consult and evaluation regarding fluid collection, no indication to drain at this time given low concern for infection.  - Hgb stable, low concern for ongoing bleeding into the fluid collection.  - Fluid collection is not surrounding aortic graft, this is reassuring.  - NPO, NGT in place to  LIS   - Continue TPN   - NG output lower today, 225 past 24 hours, 100 since mn.  Continue for now, no plans to remove until after AKA amputation.  FEN:   - electrolyte replacement prn   Renal:   - Appreciate nephrology recommendations  - continue iHD  Heme:   -Hgb 7.8 today from 7.7  -DVT as above, PE   -Continue hep gtt at this time.   - PT/OT ROM as able.  ID:   - known aspiration event 10/9   - monitor signs of pneumonia  - nystatin swish for oral candida  - improving leukocytosis, downtrending, monitor for now.   MSK:   - L AKA on schedule for Tuesday this week 10/17 8am, nerve block with RAPS team prior to case, hep gtt held in anticipation of block.   Derm:   - Has rash on abdomen, trunk, anterior bilateral thighs, erythematous, blanching, however maculopapular does not seem consistent with cellulitis  - Pharmacy reviewed medications for possible causes of rash and felt all were relatively low risk however not 0 risk   -  benadryl cream topically applied with no improvement  - trialed abd binder off for short time to see if improvement (?moisture rash), none seen.   - Dermatology consult to eval for other possible cause.   - Appreciate dermatology recs, likely morbilliform eruption vs. Low concern for possible DRESS   - CBC with diff daily    - LFTs daily    - triamcinolone 0.1% twice daily to areas w/ rash   Misc:   - VAC changes with vascular surgery M/W/F, this week will change vacs Tuesday in OR as last changed 10/15.   - abd binder on at all times.  - on hep gtt no dvt ppx needed  - SICU status    Discussed  patient with , vascular surgery attending     Will Donald MD   Vascular Surgery PGY3

## 2023-10-16 NOTE — PROGRESS NOTES
Neuro: Patient is able to nod to yes or no questions about pain, follows simple commands, BUE 3/5, RLE 2/5, LLE slight contraction. Pupils unequal Right 3mm, Left 4mm, macular degeneration- patient blind. PRN Oxy x1 overnight.  CV: SR overnight, MAPs>56  Resp: CMV 40%/450/14/5, moderate oral and ETT secretions, Tmax 100  GI/: Oliguric, no BM overnight, NG to LIS, TPN 50ml/hr, Lipids 20.8ml/hr  Skin: see flowsheet  Lines/gtts: Dex 0.4, TKO, Lipids 20.8 ml/hr, TP 50ml/hr, Heparin stopped at 4am- Anesthesia to do nerve block today     Plan: Nerve block by anesthesia today, continue to monitor and update team w/any acute concerns    For vital signs and complete assessments, please see documentation flowsheets.

## 2023-10-16 NOTE — PROGRESS NOTES
"[facility] Dermatology Progress Note    Date of Admission: Sep 24, 2023   Encounter Date: 10/16/2023    Assessment and Plan:    1. Morbilliform (Exanthematous) Eruption    Clinical impression of a diffuse erythematous eruption (onset ~10/13) is rather nonspecific but most frequently represents a viral exanthem or a morbilliform drug eruption. Morbilliform or exanthematous drug eruptions usually manifest between 4 and 14 days after initiating a medication. The time to eruption may be shorter if the patient had previously been sensitized to the triggering medication. The most common culprits include antibiotics (penicillins and sulfas), allopurinol, anti-epileptics (phenytoin, barbiturates, carbamazepine), and NSAIDs, but many other drug culprits have been reported. A skin biopsy is not routinely necessary for the diagnosis of exanthematous drug eruption because the histopathologic findings (vacuolar interface dermatitis and tissue eosinophilia) are nonspecific. In this patient, medication review does not reveal obvious drug culprits, although patient has been on vancomycin (10/1-2), metronidazole (9/25-10/3), and cefepime (9/25-10/3, 10/14-present). Although less likely, cefepime would be the most suspicious medication at this time.     Management of viral exanthems is largely supportive in the absence of specific antiviral therapy as most viral exanthems are due to benign, self-limited conditions. For exanthematous drug eruptions, prompt withdrawal of the offending drug is the mainstay of treatment. It may take an additional 1-2 weeks after stopping the medication before the eruption completely resolves. However, when a culprit drug is considered essential for the patient and there are no suitable alternatives, mild or moderate exanthematous eruptions can be \"treated through\" with topical steroids and antihistamines to alleviate itch while the suspected medication is continued. " Drug-induced hypersensitivity syndrome (DIHS; previously DRESS or Drug Reaction with Eosinophilia and Systemic Symptoms) less favored at this time (mildly elevated eosinophils 1.4 on 10/14, mildly elevated AST again since 10/12 but improved from a few weeks ago), although an early presentation of this remains a possibility. If the patient's presentation becomes more concerning for DIHS/DRESS, then a discussion regarding risks/benefits of systemic steroids would be necessary given patient's comorbidities and recent/pending surgeries. However, we suggest not routinely using systemic steroids for the treatment of uncomplicated exanthematous drug eruptions.      Recommendations:  - consider switching cefepime to an alternative antibiotic if possible  - daily CBC with diff and CMP to verify to not progressing to DIHS/DRESS (looking for new/worsening eosinophilia, transaminitis)  - recommend checking EBV, CMV, and HHV6 PCR  - continue triamcinolone 0.1% ointment BID rash on body (can change to cream per patient preference though ointment will be more potent)  - consider hydrocortisone 2.5 % cream BID rash on face  - consider cetirizine 10 mg BID PRN pruritus  - consider hydroxyzine 25 mg at bedtime PRN pruritus     We will continue to follow. Please do not hesitate to contact the dermatology resident/faculty on call for any additional questions or concerns.     Staffed with attending physician, Dr. Bill Austin MD   Dermatology Resident (PGY-4)  I, Venus Khan MD, reviewed the photos and medical record of this patient with the resident and agree with the resident s findings and plan of care as documented in the resident s note.     Interval history:  - Patient seen at bedside and discussed with wife present. Wife reports Alfredo has had a history of eczema the past several years but mostly on the ankles that they thought was due to his socks or laundry products. This had been improving. Wife thinks Alfredo  "had some pink spots appear on his arms 1-2 weeks ago, but the confluent erythema that is now involving the face is relatively new in the past few days.   - Wife reports Alfredo has had reactions to penicillin in the past, but unsure exactly what the presentation was. States he did not have any airway issues from the prior reaction.  - Plan for leg amputation tomorrow    Medications:  Reviewed in epic, pertinent findings summarized as above    Physical exam:  /69 (BP Location: Left arm)   Pulse 98   Temp 99.5  F (37.5  C) (Axillary)   Resp 18   Ht 1.727 m (5' 8\")   Wt 78.9 kg (173 lb 15.1 oz)   SpO2 99%   BMI 26.45 kg/m    GEN: Intubated and sedated. Nods head in response to questions  SKIN: Focused examination of the head, neck, arms, legs, upper chest, and lower abdomen was performed.  - Driscoll skin type: I  - There are pink macules coalescing into widespread confluent patches involving the cheeks, neck, trunk, arms, and thighs.  - No other lesions of concern on areas examined.                           Laboratory:  Reviewed in epic, pertinent findings summarized as above    Staff Involved:  Resident/Staff    "

## 2023-10-16 NOTE — CONSULTS
"[facility] Inpatient Consult Dermatology Note    Impression/Plan:    #. Morbilliform eruption  #. Concern for possible DRESS  At presentation today the patient exhibits a diffuse cutaneous eruption of faintly erythematous macules coalescing to large plaques with significant body surface involvement, however with otherwise rather nonspecific skin findings.  We feel that this eruption most likely represents what is termed a \"morbilliform eruption,\" which represents a nonthreatening, delayed hypersensitivity reaction, usually to medication, however can also be seen in the presence of viral illness.  Of less clinical suspicion (however with greater potential morbidity) is DRESS syndrome.  Clinical factors supporting a diagnosis of dress include marked eosinophilia, diffuse body surface involvement and possible lymphadenopathy, however despite these the patient and RegiSCAR socre is approximately 2-3, which reflects a \"possible\" diagnosis of dress.  Our review of the medication history shows no medications that are particularly concerning for exacerbating the syndrome.  Out of abundance of caution, we recommend close monitoring over the next few days as well as trending laboratory work (see plan below.  - Please obtain CBC with differential daily  - Please obtain LFTs daily  - Start triamcinolone 0.1% ointment twice daily to all body areas with rash  - Monitor closely for progression or change in skin findings.  - Consider peripheral smear to assess for reactive lymphocytes    Thank you for the dermatology consultation. Please do not hesitate to contact the dermatology resident/faculty on call for any additional questions or concerns. We will continue to follow.     Patient seen and evaluated with attending physician, Dr. Quincy Bernal MD  PGY-4, Internal Medicine-Dermatology  Pager: *[phone]     Dr. Tonny Austin will the the primary resident covering the service tomorrow.     Dermatology " Problem List:  #. Morbilliform eruption  #. Rule out DRESS    Date of Admission: Sep 24, 2023   Encounter Date: 10/15/2023    Reason for Consultation:   New rash at the abdomen and legs    History of Present Illness:    Alfredo Burnham is a 68 year old male who was admitted to the SICU on 9/24/2023 s/p open AAA repair, who experienced a complicated post-operative course involving bowel ischemia, cerebral infarction, LE ischemia, dialysis, and a hypoxemic event necessitating intubation.     Dermatology was consulted for a new rash present at the abdomen and legs.     Concerning original distribution, it was noted particularly underneath the abdominal binder, however also present on the legs.  Per the today's progress note, a topical Benadryl cream and a break from the abdominal binder was trialed, however this did not result in improvement of the rash.  Nursing notes that the area seemed most noticeable around the abdominal binder.    Patient himself is intubated and sedated, however nods yes to understanding and no to the question of whether he has ocular symptoms.  Denies additionally any pain or itching.     Past Medical History:   Patient Active Problem List   Diagnosis    Hypertension    Infrarenal abdominal aortic aneurysm (AAA) without rupture (H24)    Hypertension, unspecified type    Infrarenal abdominal aortic aneurysm, ruptured (H)    Acute kidney failure with tubular necrosis (H)     Past Medical History:   Diagnosis Date    Hypertension 2/8/2011    Macular degeneration      Past Surgical History:   Procedure Laterality Date    INCISION AND DRAINAGE ABDOMEN WASHOUT, COMBINED N/A 9/25/2023    Procedure: abdominal washout, combined, repacking,  abthera placement;  Surgeon: Ash Boucher MBBS;  [facility]    INCISION AND DRAINAGE ABDOMEN WASHOUT, COMBINED N/A 9/26/2023    Procedure: Abdominal washout, Retroperitoneal closure, washout left lower extremity wound;  Surgeon: Boris Lowe;   Location: [facility]    IR OR ANGIOGRAM  9/25/2023    IRRIGATION AND DEBRIDEMENT ABDOMEN WASHOUT, COMBINED N/A 9/29/2023    Procedure: exploration of  ABDOMINAL CAVITY, placement of abthera;  Surgeon: Boris Lowe;  Location: [facility]    IRRIGATION AND DEBRIDEMENT ABDOMEN WASHOUT, COMBINED N/A 10/2/2023    Procedure: Exploratory laparotomy, abominal closure, wound vac change left lower extremity;  Surgeon: Jonathan Fry MD;  [facility]: [facility]    IRRIGATION AND DEBRIDEMENT LOWER EXTREMITY, COMBINED Left 9/29/2023    Procedure: exploration of left lower extremity, partial closure of left lower extremity, wound vac placement;  Surgeon: Boris Lowe;  [facility]    LAPAROTOMY EXPLORATORY N/A 9/25/2023    Procedure: Laparotomy exploratory;  Surgeon: Roger Shirley MD;  [facility]    REPAIR ANEURYSM ABDOMINAL AORTA N/A 9/24/2023    Procedure: Resection of Ruptureed Abdominal Aneurysm, Aortic biliary bypass with 20 x 10 mm Bifurcated hemagard graft, Temporary Abdominal Closure, Endovascular Balloon Inclusion of Aorta;  Surgeon: Boris Lowe;  [facility]    SIGMOIDOSCOPY FLEXIBLE N/A 9/25/2023    Procedure: Sigmoidoscopy flexible;  Surgeon: Gabino Walker MD;  [facility]    THROMBECTOMY LOWER EXTREMITY Left 9/25/2023    Procedure: SFA, popliteal, and tibial thromboembolectomy and four compartment fasciotomy;  Surgeon: Ash Boucher MBBS;  Location: [facility]         Social History:  Patient reports that he has been smoking cigarettes. He has been smoking an average of .5 packs per day. He does not have any smokeless tobacco history on file. He reports current alcohol use. He reports that he does not use drugs.    Family History:  Family History   Problem Relation Age of Onset    Unknown/Adopted Mother     Heart Disease Mother     Arthritis Mother     Hypertension Brother     Blood Disease Sister     Psychotic Disorder Sister        Medications:  Current Facility-Administered  Medications   Medication    acetaminophen (TYLENOL) tablet 650 mg    acetaminophen (TYLENOL) tablet 975 mg    atorvastatin (LIPITOR) tablet 10 mg    benzocaine 20% (HURRICAINE/TOPEX) 20 % spray 0.5-1 mL    bisacodyl (DULCOLAX) suppository 10 mg    ceFEPIme (MAXIPIME) 1 g vial to attach to  mL bag for ADULTS or NS 50 mL bag for PEDS    chlorhexidine (PERIDEX) 0.12 % solution 15 mL    dexmedeTOMIDine (PRECEDEX) 4 mcg/mL in NS infusion    dextrose 10% infusion    dextrose 10% infusion    dextrose 10% infusion    glucose gel 15-30 g    Or    dextrose 50 % injection 25-50 mL    Or    glucagon injection 1 mg    diphenhydrAMINE-zinc acetate (BENADRYL) 1-0.1 % cream    famotidine (PEPCID) injection 20 mg    gabapentin (NEURONTIN) capsule 300 mg    heparin infusion 25,000 units in D5W 250 mL ANTICOAGULANT    hydrALAZINE (APRESOLINE) injection 10-20 mg    HYDROmorphone (PF) (DILAUDID) injection 0.3 mg    hydrOXYzine (ATARAX) tablet 25 mg    insulin aspart (NovoLOG) injection (RAPID ACTING)    labetalol (NORMODYNE/TRANDATE) injection 10-20 mg    lidocaine (XYLOCAINE) 2 % external gel    lidocaine (XYLOCAINE) 2 % external gel    lipids plant base (CLINOLIPID) 20 % infusion 250 mL    melatonin tablet 5 mg    methocarbamol (ROBAXIN) tablet 500 mg    midodrine (PROAMATINE) tablet 20 mg    naloxone (NARCAN) injection 0.2 mg    Or    naloxone (NARCAN) injection 0.4 mg    Or    naloxone (NARCAN) injection 0.2 mg    Or    naloxone (NARCAN) injection 0.4 mg    norepinephrine (LEVOPHED) 16 mg in  mL infusion MAX CONC CENTRAL LINE    nystatin (MYCOSTATIN) suspension 500,000 Units    ondansetron (ZOFRAN ODT) ODT tab 4 mg    Or    ondansetron (ZOFRAN) injection 4 mg    oxyCODONE (ROXICODONE) tablet 5-10 mg    pantoprazole (PROTONIX) 2 mg/mL suspension 40 mg    Or    pantoprazole (PROTONIX) IV push injection 40 mg    parenteral nutrition - ADULT compounded formula    polyethylene glycol (MIRALAX) Packet 17 g    prochlorperazine  "(COMPAZINE) injection 5 mg    Or    prochlorperazine (COMPAZINE) tablet 5 mg    QUEtiapine (SEROquel) half-tab 12.5-25 mg    senna-docusate (SENOKOT-S/PERICOLACE) 8.6-50 MG per tablet 1 tablet    sodium chloride (PF) 0.9% PF flush 3 mL    sodium chloride (PF) 0.9% PF flush 3 mL          Allergies   Allergen Reactions    Penicillins Swelling     Facial swelling    Has tolerated cefazolin, cefepime         Review of Systems:  See HPI      Physical exam:  Vitals: /71   Pulse 94   Temp 100  F (37.8  C)   Resp 18   Ht 1.727 m (5' 8\")   Wt 76.7 kg (169 lb 1.5 oz)   SpO2 100%   BMI 25.71 kg/m    GEN: Sedated, intubated   SKIN: Total skin including the undergarment areas was performed. The exam included the head/face, neck, both arms, chest, back, abdomen, both legs, digits and/or nails.   -Driscoll skin type: II  -At the abdomen, chest, extremities are a multitude of small erythematous macules that coalesce to generalized patches of blanchable erythema, without notable secondary skin changes with the exception of slight superficial desquamation at the scrotal region.  - No facial edema or plethora  - Possible shotty lymphadenopathy at the left submandibular region.  -No other lesions of concern on areas examined.     Laboratory:  Results for orders placed or performed during the hospital encounter of 09/24/23 (from the past 24 hour(s))   Glucose by meter   Result Value Ref Range    GLUCOSE BY METER POCT 130 (H) 70 - 99 mg/dL   CBC with platelets   Result Value Ref Range    WBC Count 13.8 (H) 4.0 - 11.0 10e3/uL    RBC Count 2.35 (L) 4.40 - 5.90 10e6/uL    Hemoglobin 7.3 (L) 13.3 - 17.7 g/dL    Hematocrit 22.5 (L) 40.0 - 53.0 %    MCV 96 78 - 100 fL    MCH 31.1 26.5 - 33.0 pg    MCHC 32.4 31.5 - 36.5 g/dL    RDW 17.1 (H) 10.0 - 15.0 %    Platelet Count 142 (L) 150 - 450 10e3/uL   Hepatic panel   Result Value Ref Range    Protein Total 5.5 (L) 6.4 - 8.3 g/dL    Albumin 2.5 (L) 3.5 - 5.2 g/dL    Bilirubin Total " 0.4 <=1.2 mg/dL    Alkaline Phosphatase 98 40 - 129 U/L    AST 65 (H) 0 - 45 U/L    ALT 34 0 - 70 U/L    Bilirubin Direct 0.24 0.00 - 0.30 mg/dL   Basic metabolic panel   Result Value Ref Range    Sodium 136 135 - 145 mmol/L    Potassium 4.0 3.4 - 5.3 mmol/L    Chloride 96 (L) 98 - 107 mmol/L    Carbon Dioxide (CO2) 21 (L) 22 - 29 mmol/L    Anion Gap 19 (H) 7 - 15 mmol/L    Urea Nitrogen 95.8 (H) 8.0 - 23.0 mg/dL    Creatinine 5.77 (H) 0.67 - 1.17 mg/dL    GFR Estimate 10 (L) >60 mL/min/1.73m2    Calcium 8.6 (L) 8.8 - 10.2 mg/dL    Glucose 123 (H) 70 - 99 mg/dL   Ionized Calcium   Result Value Ref Range    Calcium Ionized Whole Blood 4.4 4.4 - 5.2 mg/dL   Lactic acid whole blood   Result Value Ref Range    Lactic Acid 1.3 0.7 - 2.0 mmol/L   Magnesium   Result Value Ref Range    Magnesium 2.6 (H) 1.7 - 2.3 mg/dL   Phosphorus   Result Value Ref Range    Phosphorus 7.1 (H) 2.5 - 4.5 mg/dL   Heparin Unfractionated Anti Xa Level   Result Value Ref Range    Anti Xa Unfractionated Heparin 0.67 For Reference Range, See Comment IU/mL    Narrative    Therapeutic Range: UFH: 0.25-0.50 IU/mL for low intensity dosing,  0.30-0.70 IU/mL for high intensity dosing DVT and PE.  This test is not validated for other direct factor X inhibitors (e.g. rivaroxaban, apixaban, edoxaban, betrixaban, fondaparinux) and should not be used for monitoring of other medications.   INR   Result Value Ref Range    INR 1.32 (H) 0.85 - 1.15   Partial thromboplastin time   Result Value Ref Range    aPTT >240 (HH) 22 - 38 Seconds   Fibrinogen activity   Result Value Ref Range    Fibrinogen Activity 659 (H) 170 - 490 mg/dL   Blood gas venous with oxyhemoglobin   Result Value Ref Range    pH Venous 7.40 7.32 - 7.43    pCO2 Venous 37 (L) 40 - 50 mm Hg    pO2 Venous 49 (H) 25 - 47 mm Hg    Bicarbonate Venous 23 21 - 28 mmol/L    FIO2 40     Oxyhemoglobin Venous 81 (H) 70 - 75 %    Base Excess/Deficit -1.8 -7.7 - 1.9 mmol/L   Glucose by meter   Result Value  Ref Range    GLUCOSE BY METER POCT 114 (H) 70 - 99 mg/dL   Glucose by meter   Result Value Ref Range    GLUCOSE BY METER POCT 137 (H) 70 - 99 mg/dL   Glucose by meter   Result Value Ref Range    GLUCOSE BY METER POCT 133 (H) 70 - 99 mg/dL   Hemoglobin   Result Value Ref Range    Hemoglobin 7.0 (L) 13.3 - 17.7 g/dL   Prepare red blood cells (unit)   Result Value Ref Range    Blood Component Type Red Blood Cells     Product Code [id number]     Unit Status Transfused     Unit Number [id number]     CROSSMATCH Compatible     CODING SYSTEM [id number]     ISSUE DATE AND TIME [id number]     UNIT ABO/RH O+     UNIT TYPE ISBT 5100    Glucose by meter   Result Value Ref Range    GLUCOSE BY METER POCT 129 (H) 70 - 99 mg/dL   Hemoglobin   Result Value Ref Range    Hemoglobin 7.8 (L) 13.3 - 17.7 g/dL   Glucose by meter   Result Value Ref Range    GLUCOSE BY METER POCT 122 (H) 70 - 99 mg/dL       Dr. Mathew staffed the patient.    Staff Involved:  Resident/Staff

## 2023-10-16 NOTE — PROGRESS NOTES
CLINICAL NUTRITION SERVICES - BRIEF NOTE     Nutrition Prescription     RECOMMENDATIONS FOR MDs/PROVIDERS TO ORDER:  Please send new consult for RD to place ppFT tomorrow (10/17) following completion of ADDIS on 10/16 (Registered Dietitian to Assess and Order TF per Medical Nutrition Therapy Protocol: Dietitian to place (and replace as necessary) a post-pyloric feeding tube at the bedside using Enteral access system (Cortrak))    Recommendations already ordered by Registered Dietitian (RD):  Continue PN as ordered to meet full estimated nutrition needs, start trophic feeds via NGT following today's ADDIS:  Goal PN provides 240 g dextrose, 120 g AA, and lipid kcals from propofol vs daily 250ml 20% clinolipid for total provision of  1796 Kcals (25 Kcals/kg), 1.7 g/kg protein, GIR 2.3 mg/kg/minute, and 28% fat kcals on average daily.      Novasource Renal (or equivalent) @ 10 ml/hr (240 ml) provides 480 kcal, 22 g pro, 44 g CHO, 172 ml free water, and 0 g fiber daily.     Future/Additional Recommendations:  Once ppFT in place and appropriate to advance enteral feeds past trophic rate and TPN is discontinued  begin advancing towards   GOAL EN: Novasource Renal (or equivalent) @ 35 ml/hr (840 ml) + 3 pkts prosource TF 20 provides 1920 kcal (27 kcal/kg), 136 g pro (1.9 g/kg), 154 g CHO, 602 ml free water, and 0 g fiber daily.    For last full RD assessment, see note dated 10/11/23    NEW FINDINGS   -- Plan for ADDIS this afternoon, post-ADDIS plan to initiate TF via NGT (transition NGT now for LIS to NGT for TF; NGT output significantly decreased, 10/15 225 mL, 250 ml output today thus far).   -- TPN continues at goal dextrose, TG WNL 10/12; 10/16 K, Mg WNL, phos elevated (7.7) and hyponatremia (131)   -- Plan for ppFT placement tomorrow (10/17)      INTERVENTIONS  Implementation  Collaboration with other providers  Enteral Nutrition - Initiate trophic feeds   Parenteral Nutrition/IV Fluids - Continue as ordered      Monitoring/Evaluation  Will continue to monitor and evaluate per protocol.    Windy Arellano, MPH, RDN, LD  4E (SICU) RD pager: [phone] Ascom: [phone]  Weekend/Holiday RD pager [phone]

## 2023-10-16 NOTE — PHARMACY-CONSULT NOTE
Pharmacy Tube Feeding Consult    Medication reviewed for administration by feeding tube and for potential food/drug interactions.    Recommendation: No changes are needed at this time.     Pharmacy will continue to follow as new medications are ordered.    Andie Tovar, JustinD, BCCCP

## 2023-10-16 NOTE — PROGRESS NOTES
Brief GI progress note  Date: 10/16/2023     Chart reviewed. Patient is not seen today.    ASSESSMENT:  68 year old male who has had a complicated hospital course since initial presentation of a contained rupture AAA on 9/24/23. He underwent open AAA repair on 9/24 into 9/25am with 30 min supraceliac clamp time, prolonged inter-renal clamp time, temporary abdominal closure with multiple RTOR with abdominal washout and final abdominal closure of the fascia and VAC placement 10/2/2023.  Hospital course has been complicated by ischemic colitis (no transmural necrosis), renal failure on CRRT and then iHD, LLE ischemia and embolic CNS infarcts.     # Peripancreatic fluid collection  # Hyperattenuation of the peripancreatic fluid collection concerning for hemorrhage   On CT 10/13, the pancreatic parenchyma was normal. Lipase 10/14 is normal and no lipase was checked previously after AAA repair. Unclear etiology of peripancreatic fluid collection at this time ddx include ischemic pancreatitis with peripancreatic fat necrosis vs reactive collection from AAA repair.     If the fluid collection were due to peripancreatic fat necrosis, there is no indication for endoscopic intervention to the fluid collection in the absence of evidence for infected fluid collection (no gas bubbles) or bowel obstruction.      Regarding the possible bleeding into the peripancreatic fluid collection, the hyperenhancement in the fluid collection on CT is mild and no pseudoaneurysm or active extravasation was seen. Therefore unlikely to be the source of his anemia.  He does have a large left iliac hematoma which could be the source for acute on chronic anemia.  Besides, endoscopic intervention is not the treatment for bleeding into the peripancreatic fluid collection -this would be IR, hiral if pseudoaneurysm is present.      Recommendations  - If concern for ongoing intra-abdominal bleeding, consider CTA  - If in the future patient is unable to  tolerate advancing diet, pain with eating could consider repeat CT abd pelvis w/ contrast to evaluate for possible necrotizing pancreatitis    GI will sign off. Please let GI know if he has any further sign of intra-abdominal bleeding, or unable to tolerate diet as above.     Gastroenterology follow up recommendations: No GI follow-up needed.    Thank you for involving us in this patient's care. Please do not hesitate to contact the GI service with any questions or concerns.      Pt care plan discussed with Dr. Dupree, GI staff physician.    Margi Troncoso MD  Gastroenterology Fellow

## 2023-10-16 NOTE — ANESTHESIA PREPROCEDURE EVALUATION
Anesthesia Pre-Procedure Evaluation    Patient: Alfredo Burnham   MRN: [id number] : 1955        Procedure : Procedure(s):  AMPUTATION, ABOVE KNEE          Past Medical History:   Diagnosis Date    Hypertension 2011    Macular degeneration       Past Surgical History:   Procedure Laterality Date    INCISION AND DRAINAGE ABDOMEN WASHOUT, COMBINED N/A 2023    Procedure: abdominal washout, combined, repacking,  abthera placement;  Surgeon: Ash Boucher MBBS;  Location: [facility]    INCISION AND DRAINAGE ABDOMEN WASHOUT, COMBINED N/A 2023    Procedure: Abdominal washout, Retroperitoneal closure, washout left lower extremity wound;  Surgeon: Boris Lowe;  Location: [facility]    IR OR ANGIOGRAM  2023    IRRIGATION AND DEBRIDEMENT ABDOMEN WASHOUT, COMBINED N/A 2023    Procedure: exploration of  ABDOMINAL CAVITY, placement of abthera;  Surgeon: Boris Lowe;  [facility]: [facility]    IRRIGATION AND DEBRIDEMENT ABDOMEN WASHOUT, COMBINED N/A 10/2/2023    Procedure: Exploratory laparotomy, abominal closure, wound vac change left lower extremity;  Surgeon: Jonathan Fry MD;  [facility]    IRRIGATION AND DEBRIDEMENT LOWER EXTREMITY, COMBINED Left 2023    Procedure: exploration of left lower extremity, partial closure of left lower extremity, wound vac placement;  Surgeon: Boris Lowe;  [facility]    LAPAROTOMY EXPLORATORY N/A 2023    Procedure: Laparotomy exploratory;  Surgeon: Roger Shirley MD;  [facility]    REPAIR ANEURYSM ABDOMINAL AORTA N/A 2023    Procedure: Resection of Ruptureed Abdominal Aneurysm, Aortic biliary bypass with 20 x 10 mm Bifurcated hemagard graft, Temporary Abdominal Closure, Endovascular Balloon Inclusion of Aorta;  Surgeon: Boris Lowe;  [facility]    SIGMOIDOSCOPY FLEXIBLE N/A 2023    Procedure: Sigmoidoscopy flexible;  Surgeon: Gabino Walker MD;  Location: [facility]    THROMBECTOMY LOWER  EXTREMITY Left 9/25/2023    Procedure: SFA, popliteal, and tibial thromboembolectomy and four compartment fasciotomy;  Surgeon: Ash Boucher MBBS;  Location: [facility]      Allergies   Allergen Reactions    Penicillins Swelling     Facial swelling    Has tolerated cefazolin, cefepime      Social History     Tobacco Use    Smoking status: Every Day     Packs/day: .5     Types: Cigarettes    Smokeless tobacco: Not on file   Substance Use Topics    Alcohol use: Yes     Comment: 1-2/wk      Wt Readings from Last 1 Encounters:   10/16/23 78.9 kg (173 lb 15.1 oz)        Anesthesia Evaluation   Pt has had prior anesthetic. Type: General.        ROS/MED HX  ENT/Pulmonary: Comment: The patient is mechanical ventilated on minimal ventilator settings. Was reintubated after an aspiration event during a ADDIS.     (+)     RIGO risk factors,  hypertension,         tobacco use, Current use,                      Neurologic:     (+)              TIA,                  Cardiovascular: Comment: S/p open AAA repair 9/24/23 c/b possible embolic stroke, LLE ischemia. Off pressors in ICU.     (+)  hypertension- Peripheral Vascular Disease-   -  - -                                      METS/Exercise Tolerance:     Hematologic: Comments: Thrombocytopenia    (+)      anemia,          Musculoskeletal:       GI/Hepatic:     (+) GERD,                   Renal/Genitourinary: Comment: On CRRT - neg Renal ROS   (+) renal disease,             Endo:  - neg endo ROS  (-) Type II DM   Psychiatric/Substance Use:     (+)   alcohol abuse      Infectious Disease:  - neg infectious disease ROS     Malignancy:       Other:               OUTSIDE LABS:  CBC:   Lab Results   Component Value Date    WBC 13.5 (H) 10/16/2023    WBC 13.8 (H) 10/15/2023    HGB 7.8 (L) 10/16/2023    HGB 7.7 (L) 10/15/2023    HCT 23.4 (L) 10/16/2023    HCT 22.5 (L) 10/15/2023     (L) 10/16/2023     (L) 10/15/2023     BMP:   Lab Results   Component Value Date     (L)  "10/16/2023     10/15/2023    POTASSIUM 4.2 10/16/2023    POTASSIUM 4.0 10/15/2023    CHLORIDE 91 (L) 10/16/2023    CHLORIDE 96 (L) 10/15/2023    CO2 15 (L) 10/16/2023    CO2 21 (L) 10/15/2023    .0 (H) 10/16/2023    BUN 95.8 (H) 10/15/2023    CR 7.20 (H) 10/16/2023    CR 5.77 (H) 10/15/2023     (H) 10/16/2023     (H) 10/16/2023     COAGS:   Lab Results   Component Value Date    PTT 50 (H) 10/16/2023    INR 1.32 (H) 10/16/2023    FIBR 659 (H) 10/15/2023     POC: No results found for: \"BGM\", \"HCG\", \"HCGS\"  HEPATIC:   Lab Results   Component Value Date    ALBUMIN 2.5 (L) 10/16/2023    PROTTOTAL 5.4 (L) 10/16/2023    ALT 33 10/16/2023    AST 59 (H) 10/16/2023    ALKPHOS 82 10/16/2023    BILITOTAL 0.4 10/16/2023     OTHER:   Lab Results   Component Value Date    PH 7.41 10/14/2023    PH 7.41 10/14/2023    LACT 1.2 10/16/2023    A1C 5.7 (H) 09/25/2023    OMAR 8.5 (L) 10/16/2023    PHOS 7.7 (H) 10/16/2023    MAG 2.7 (H) 10/16/2023    LIPASE 51 10/14/2023    TSH 2.978 02/24/2009       Anesthesia Plan    ASA Status:  4    NPO Status:  NPO Appropriate    Anesthesia Type: General.     - Airway: ETT      Maintenance: Balanced.        Consents            Postoperative Care            Comments:                Bobby Denny MD  "

## 2023-10-17 ENCOUNTER — APPOINTMENT (OUTPATIENT)
Dept: GENERAL RADIOLOGY | Facility: CLINIC | Age: 68
DRG: 268 | End: 2023-10-17
Payer: COMMERCIAL

## 2023-10-17 ENCOUNTER — ANESTHESIA (OUTPATIENT)
Dept: SURGERY | Facility: CLINIC | Age: 68
DRG: 268 | End: 2023-10-17
Payer: COMMERCIAL

## 2023-10-17 LAB
ALBUMIN SERPL BCG-MCNC: 2.5 G/DL (ref 3.5–5.2)
ALP SERPL-CCNC: 87 U/L (ref 40–129)
ALT SERPL W P-5'-P-CCNC: 32 U/L (ref 0–70)
ANION GAP SERPL CALCULATED.3IONS-SCNC: 18 MMOL/L (ref 7–15)
AST SERPL W P-5'-P-CCNC: 51 U/L (ref 0–45)
BASE EXCESS BLDV CALC-SCNC: -1.6 MMOL/L (ref -7.7–1.9)
BASO+EOS+MONOS # BLD AUTO: ABNORMAL 10*3/UL
BASO+EOS+MONOS NFR BLD AUTO: ABNORMAL %
BASOPHILS # BLD AUTO: ABNORMAL 10*3/UL
BASOPHILS # BLD MANUAL: 0.3 10E3/UL (ref 0–0.2)
BASOPHILS NFR BLD AUTO: ABNORMAL %
BASOPHILS NFR BLD MANUAL: 2 %
BILIRUB DIRECT SERPL-MCNC: 0.24 MG/DL (ref 0–0.3)
BILIRUB SERPL-MCNC: 0.4 MG/DL
BUN SERPL-MCNC: 74.9 MG/DL (ref 8–23)
CA-I BLD-MCNC: 4.5 MG/DL (ref 4.4–5.2)
CALCIUM SERPL-MCNC: 8.2 MG/DL (ref 8.8–10.2)
CHLORIDE SERPL-SCNC: 94 MMOL/L (ref 98–107)
CMV DNA SPEC NAA+PROBE-ACNC: NOT DETECTED IU/ML
CREAT SERPL-MCNC: 4.26 MG/DL (ref 0.67–1.17)
DEPRECATED HCO3 PLAS-SCNC: 20 MMOL/L (ref 22–29)
EGFRCR SERPLBLD CKD-EPI 2021: 14 ML/MIN/1.73M2
EOSINOPHIL # BLD AUTO: ABNORMAL 10*3/UL
EOSINOPHIL # BLD MANUAL: 1.2 10E3/UL (ref 0–0.7)
EOSINOPHIL NFR BLD AUTO: ABNORMAL %
EOSINOPHIL NFR BLD MANUAL: 9 %
ERYTHROCYTE [DISTWIDTH] IN BLOOD BY AUTOMATED COUNT: 18.4 % (ref 10–15)
ERYTHROCYTE [DISTWIDTH] IN BLOOD BY AUTOMATED COUNT: 18.5 % (ref 10–15)
GLUCOSE BLDC GLUCOMTR-MCNC: 138 MG/DL (ref 70–99)
GLUCOSE BLDC GLUCOMTR-MCNC: 139 MG/DL (ref 70–99)
GLUCOSE BLDC GLUCOMTR-MCNC: 158 MG/DL (ref 70–99)
GLUCOSE BLDC GLUCOMTR-MCNC: 183 MG/DL (ref 70–99)
GLUCOSE SERPL-MCNC: 152 MG/DL (ref 70–99)
HCO3 BLDV-SCNC: 23 MMOL/L (ref 21–28)
HCT VFR BLD AUTO: 21.9 % (ref 40–53)
HCT VFR BLD AUTO: 22.7 % (ref 40–53)
HGB BLD-MCNC: 7.3 G/DL (ref 13.3–17.7)
HGB BLD-MCNC: 7.5 G/DL (ref 13.3–17.7)
IMM GRANULOCYTES # BLD: ABNORMAL 10*3/UL
IMM GRANULOCYTES NFR BLD: ABNORMAL %
LACTATE SERPL-SCNC: 1.7 MMOL/L (ref 0.7–2)
LYMPHOCYTES # BLD AUTO: ABNORMAL 10*3/UL
LYMPHOCYTES # BLD MANUAL: 0 10E3/UL (ref 0.8–5.3)
LYMPHOCYTES NFR BLD AUTO: ABNORMAL %
LYMPHOCYTES NFR BLD MANUAL: 0 %
MAGNESIUM SERPL-MCNC: 1.9 MG/DL (ref 1.7–2.3)
MCH RBC QN AUTO: 30 PG (ref 26.5–33)
MCH RBC QN AUTO: 30.1 PG (ref 26.5–33)
MCHC RBC AUTO-ENTMCNC: 33 G/DL (ref 31.5–36.5)
MCHC RBC AUTO-ENTMCNC: 33.3 G/DL (ref 31.5–36.5)
MCV RBC AUTO: 90 FL (ref 78–100)
MCV RBC AUTO: 91 FL (ref 78–100)
MONOCYTES # BLD AUTO: ABNORMAL 10*3/UL
MONOCYTES # BLD MANUAL: 1.3 10E3/UL (ref 0–1.3)
MONOCYTES NFR BLD AUTO: ABNORMAL %
MONOCYTES NFR BLD MANUAL: 10 %
NEUTROPHILS # BLD AUTO: ABNORMAL 10*3/UL
NEUTROPHILS # BLD MANUAL: 10.4 10E3/UL (ref 1.6–8.3)
NEUTROPHILS NFR BLD AUTO: ABNORMAL %
NEUTROPHILS NFR BLD MANUAL: 79 %
NRBC # BLD AUTO: 0 10E3/UL
NRBC # BLD AUTO: 0.1 10E3/UL
NRBC BLD AUTO-RTO: 0 /100
NRBC BLD MANUAL-RTO: 1 %
O2/TOTAL GAS SETTING VFR VENT: 40 %
OXYHGB MFR BLDV: 59 % (ref 70–75)
PCO2 BLDV: 36 MM HG (ref 40–50)
PH BLDV: 7.41 [PH] (ref 7.32–7.43)
PHOSPHATE SERPL-MCNC: 4.3 MG/DL (ref 2.5–4.5)
PLAT MORPH BLD: ABNORMAL
PLATELET # BLD AUTO: 130 10E3/UL (ref 150–450)
PLATELET # BLD AUTO: 141 10E3/UL (ref 150–450)
PO2 BLDV: 34 MM HG (ref 25–47)
POTASSIUM SERPL-SCNC: 3.4 MMOL/L (ref 3.4–5.3)
PROT SERPL-MCNC: 5.5 G/DL (ref 6.4–8.3)
RBC # BLD AUTO: 2.43 10E6/UL (ref 4.4–5.9)
RBC # BLD AUTO: 2.49 10E6/UL (ref 4.4–5.9)
RBC MORPH BLD: ABNORMAL
SODIUM SERPL-SCNC: 132 MMOL/L (ref 135–145)
UFH PPP CHRO-ACNC: 0.36 IU/ML
WBC # BLD AUTO: 13.2 10E3/UL (ref 4–11)
WBC # BLD AUTO: 14.6 10E3/UL (ref 4–11)

## 2023-10-17 PROCEDURE — 84100 ASSAY OF PHOSPHORUS: CPT | Performed by: PHYSICIAN ASSISTANT

## 2023-10-17 PROCEDURE — 36415 COLL VENOUS BLD VENIPUNCTURE: CPT

## 2023-10-17 PROCEDURE — 250N000009 HC RX 250: Performed by: STUDENT IN AN ORGANIZED HEALTH CARE EDUCATION/TRAINING PROGRAM

## 2023-10-17 PROCEDURE — 250N000011 HC RX IP 250 OP 636: Mod: JZ

## 2023-10-17 PROCEDURE — 250N000009 HC RX 250

## 2023-10-17 PROCEDURE — 82248 BILIRUBIN DIRECT: CPT

## 2023-10-17 PROCEDURE — 250N000013 HC RX MED GY IP 250 OP 250 PS 637: Performed by: STUDENT IN AN ORGANIZED HEALTH CARE EDUCATION/TRAINING PROGRAM

## 2023-10-17 PROCEDURE — 85007 BL SMEAR W/DIFF WBC COUNT: CPT

## 2023-10-17 PROCEDURE — 87071 CULTURE AEROBIC QUANT OTHER: CPT

## 2023-10-17 PROCEDURE — 250N000011 HC RX IP 250 OP 636: Performed by: STUDENT IN AN ORGANIZED HEALTH CARE EDUCATION/TRAINING PROGRAM

## 2023-10-17 PROCEDURE — 258N000003 HC RX IP 258 OP 636: Performed by: STUDENT IN AN ORGANIZED HEALTH CARE EDUCATION/TRAINING PROGRAM

## 2023-10-17 PROCEDURE — 83605 ASSAY OF LACTIC ACID: CPT | Performed by: PHYSICIAN ASSISTANT

## 2023-10-17 PROCEDURE — 74018 RADEX ABDOMEN 1 VIEW: CPT | Mod: 26 | Performed by: STUDENT IN AN ORGANIZED HEALTH CARE EDUCATION/TRAINING PROGRAM

## 2023-10-17 PROCEDURE — 82330 ASSAY OF CALCIUM: CPT | Performed by: PHYSICIAN ASSISTANT

## 2023-10-17 PROCEDURE — 250N000009 HC RX 250: Mod: JZ | Performed by: SURGERY

## 2023-10-17 PROCEDURE — 999N000065 XR ABDOMEN PORT 1 VIEW

## 2023-10-17 PROCEDURE — 97607 NEG PRS WND THR NDME<=50SQCM: CPT | Mod: XS | Performed by: SURGERY

## 2023-10-17 PROCEDURE — 999N000157 HC STATISTIC RCP TIME EA 10 MIN

## 2023-10-17 PROCEDURE — 27590 AMPUTATE LEG AT THIGH: CPT | Mod: 58 | Performed by: SURGERY

## 2023-10-17 PROCEDURE — 82805 BLOOD GASES W/O2 SATURATION: CPT | Performed by: STUDENT IN AN ORGANIZED HEALTH CARE EDUCATION/TRAINING PROGRAM

## 2023-10-17 PROCEDURE — 360N000076 HC SURGERY LEVEL 3, PER MIN: Performed by: SURGERY

## 2023-10-17 PROCEDURE — 85027 COMPLETE CBC AUTOMATED: CPT

## 2023-10-17 PROCEDURE — 0Y6D0Z2 DETACHMENT AT LEFT UPPER LEG, MID, OPEN APPROACH: ICD-10-PCS | Performed by: SURGERY

## 2023-10-17 PROCEDURE — 250N000013 HC RX MED GY IP 250 OP 250 PS 637

## 2023-10-17 PROCEDURE — 272N000001 HC OR GENERAL SUPPLY STERILE: Performed by: SURGERY

## 2023-10-17 PROCEDURE — 88307 TISSUE EXAM BY PATHOLOGIST: CPT | Mod: 26 | Performed by: PATHOLOGY

## 2023-10-17 PROCEDURE — 250N000025 HC SEVOFLURANE, PER MIN: Performed by: SURGERY

## 2023-10-17 PROCEDURE — 250N000013 HC RX MED GY IP 250 OP 250 PS 637: Performed by: SURGERY

## 2023-10-17 PROCEDURE — 94003 VENT MGMT INPAT SUBQ DAY: CPT

## 2023-10-17 PROCEDURE — 88307 TISSUE EXAM BY PATHOLOGIST: CPT | Mod: TC | Performed by: SURGERY

## 2023-10-17 PROCEDURE — 250N000011 HC RX IP 250 OP 636: Performed by: SURGERY

## 2023-10-17 PROCEDURE — 999N000253 HC STATISTIC WEANING TRIALS

## 2023-10-17 PROCEDURE — 85520 HEPARIN ASSAY: CPT | Performed by: SURGERY

## 2023-10-17 PROCEDURE — 99291 CRITICAL CARE FIRST HOUR: CPT | Mod: GC | Performed by: SURGERY

## 2023-10-17 PROCEDURE — 99024 POSTOP FOLLOW-UP VISIT: CPT | Performed by: NURSE PRACTITIONER

## 2023-10-17 PROCEDURE — 200N000002 HC R&B ICU UMMC

## 2023-10-17 PROCEDURE — 87106 FUNGI IDENTIFICATION YEAST: CPT

## 2023-10-17 PROCEDURE — 44500 INTRO GASTROINTESTINAL TUBE: CPT | Performed by: DIETITIAN, REGISTERED

## 2023-10-17 PROCEDURE — B4185 PARENTERAL SOL 10 GM LIPIDS: HCPCS | Mod: JZ | Performed by: SURGERY

## 2023-10-17 PROCEDURE — 370N000017 HC ANESTHESIA TECHNICAL FEE, PER MIN: Performed by: SURGERY

## 2023-10-17 PROCEDURE — 80053 COMPREHEN METABOLIC PANEL: CPT

## 2023-10-17 PROCEDURE — 83735 ASSAY OF MAGNESIUM: CPT | Performed by: PHYSICIAN ASSISTANT

## 2023-10-17 RX ORDER — CLINDAMYCIN PHOSPHATE 900 MG/50ML
900 INJECTION, SOLUTION INTRAVENOUS
Status: CANCELLED | OUTPATIENT
Start: 2023-10-17

## 2023-10-17 RX ORDER — PROPOFOL 10 MG/ML
INJECTION, EMULSION INTRAVENOUS CONTINUOUS PRN
Status: DISCONTINUED | OUTPATIENT
Start: 2023-10-17 | End: 2023-10-17

## 2023-10-17 RX ORDER — NOREPINEPHRINE BITARTRATE 0.02 MG/ML
INJECTION, SOLUTION INTRAVENOUS CONTINUOUS PRN
Status: DISCONTINUED | OUTPATIENT
Start: 2023-10-17 | End: 2023-10-17

## 2023-10-17 RX ORDER — SODIUM CHLORIDE, SODIUM GLUCONATE, SODIUM ACETATE, POTASSIUM CHLORIDE AND MAGNESIUM CHLORIDE 526; 502; 368; 37; 30 MG/100ML; MG/100ML; MG/100ML; MG/100ML; MG/100ML
INJECTION, SOLUTION INTRAVENOUS CONTINUOUS PRN
Status: DISCONTINUED | OUTPATIENT
Start: 2023-10-17 | End: 2023-10-17

## 2023-10-17 RX ORDER — CLINDAMYCIN PHOSPHATE 900 MG/50ML
900 INJECTION, SOLUTION INTRAVENOUS ONCE
Status: COMPLETED | OUTPATIENT
Start: 2023-10-17 | End: 2023-10-17

## 2023-10-17 RX ORDER — MAGNESIUM SULFATE HEPTAHYDRATE 40 MG/ML
2 INJECTION, SOLUTION INTRAVENOUS ONCE
Status: COMPLETED | OUTPATIENT
Start: 2023-10-17 | End: 2023-10-17

## 2023-10-17 RX ORDER — HEPARIN SODIUM 10000 [USP'U]/100ML
0-5000 INJECTION, SOLUTION INTRAVENOUS CONTINUOUS
Status: DISPENSED | OUTPATIENT
Start: 2023-10-17 | End: 2023-10-18

## 2023-10-17 RX ORDER — CLINDAMYCIN PHOSPHATE 900 MG/50ML
900 INJECTION, SOLUTION INTRAVENOUS SEE ADMIN INSTRUCTIONS
Status: CANCELLED | OUTPATIENT
Start: 2023-10-17

## 2023-10-17 RX ORDER — FENTANYL CITRATE 50 UG/ML
INJECTION, SOLUTION INTRAMUSCULAR; INTRAVENOUS PRN
Status: DISCONTINUED | OUTPATIENT
Start: 2023-10-17 | End: 2023-10-17

## 2023-10-17 RX ORDER — GUAIFENESIN 600 MG/1
15 TABLET, EXTENDED RELEASE ORAL DAILY
Status: DISCONTINUED | OUTPATIENT
Start: 2023-10-19 | End: 2023-10-30

## 2023-10-17 RX ORDER — DEXTROSE MONOHYDRATE 100 MG/ML
INJECTION, SOLUTION INTRAVENOUS CONTINUOUS PRN
Status: DISCONTINUED | OUTPATIENT
Start: 2023-10-17 | End: 2023-11-12

## 2023-10-17 RX ORDER — PROPOFOL 10 MG/ML
INJECTION, EMULSION INTRAVENOUS
Status: DISCONTINUED
Start: 2023-10-17 | End: 2023-10-18 | Stop reason: HOSPADM

## 2023-10-17 RX ORDER — CEFAZOLIN SODIUM/WATER 2 G/20 ML
2 SYRINGE (ML) INTRAVENOUS
Status: CANCELLED | OUTPATIENT
Start: 2023-10-17

## 2023-10-17 RX ADMIN — MAGNESIUM SULFATE HEPTAHYDRATE: 500 INJECTION, SOLUTION INTRAMUSCULAR; INTRAVENOUS at 19:55

## 2023-10-17 RX ADMIN — ACETAMINOPHEN 975 MG: 325 TABLET, FILM COATED ORAL at 20:01

## 2023-10-17 RX ADMIN — ACETAMINOPHEN 975 MG: 325 TABLET, FILM COATED ORAL at 15:40

## 2023-10-17 RX ADMIN — Medication 50 MG: at 08:34

## 2023-10-17 RX ADMIN — NYSTATIN 500000 UNITS: 100000 SUSPENSION ORAL at 20:01

## 2023-10-17 RX ADMIN — SENNOSIDES AND DOCUSATE SODIUM 1 TABLET: 50; 8.6 TABLET ORAL at 20:01

## 2023-10-17 RX ADMIN — Medication 0.03 MCG/KG/MIN: at 09:31

## 2023-10-17 RX ADMIN — NYSTATIN 500000 UNITS: 100000 SUSPENSION ORAL at 15:41

## 2023-10-17 RX ADMIN — PROPOFOL 20 MCG/KG/MIN: 10 INJECTION, EMULSION INTRAVENOUS at 10:50

## 2023-10-17 RX ADMIN — SODIUM CHLORIDE, SODIUM GLUCONATE, SODIUM ACETATE, POTASSIUM CHLORIDE AND MAGNESIUM CHLORIDE: 526; 502; 368; 37; 30 INJECTION, SOLUTION INTRAVENOUS at 08:10

## 2023-10-17 RX ADMIN — DEXMEDETOMIDINE HYDROCHLORIDE 0.6 MCG/KG/HR: 400 INJECTION INTRAVENOUS at 03:32

## 2023-10-17 RX ADMIN — PHENYLEPHRINE HYDROCHLORIDE 100 MCG: 10 INJECTION INTRAVENOUS at 09:39

## 2023-10-17 RX ADMIN — SUGAMMADEX 200 MG: 100 INJECTION, SOLUTION INTRAVENOUS at 11:00

## 2023-10-17 RX ADMIN — CLINDAMYCIN PHOSPHATE 900 MG: 900 INJECTION, SOLUTION INTRAVENOUS at 08:21

## 2023-10-17 RX ADMIN — Medication 20 MG: at 09:38

## 2023-10-17 RX ADMIN — LIDOCAINE HYDROCHLORIDE 5 ML: 20 SOLUTION ORAL; TOPICAL at 12:34

## 2023-10-17 RX ADMIN — INSULIN ASPART 1 UNITS: 100 INJECTION, SOLUTION INTRAVENOUS; SUBCUTANEOUS at 20:11

## 2023-10-17 RX ADMIN — PHENYLEPHRINE HYDROCHLORIDE 100 MCG: 10 INJECTION INTRAVENOUS at 09:22

## 2023-10-17 RX ADMIN — INSULIN ASPART 2 UNITS: 100 INJECTION, SOLUTION INTRAVENOUS; SUBCUTANEOUS at 11:44

## 2023-10-17 RX ADMIN — ATORVASTATIN CALCIUM 10 MG: 10 TABLET, FILM COATED ORAL at 20:01

## 2023-10-17 RX ADMIN — ACETAMINOPHEN 975 MG: 325 TABLET, FILM COATED ORAL at 03:31

## 2023-10-17 RX ADMIN — MAGNESIUM SULFATE IN WATER 2 G: 40 INJECTION, SOLUTION INTRAVENOUS at 05:24

## 2023-10-17 RX ADMIN — CHLORHEXIDINE GLUCONATE 15 ML: 1.2 RINSE ORAL at 20:01

## 2023-10-17 RX ADMIN — NYSTATIN 500000 UNITS: 100000 SUSPENSION ORAL at 11:45

## 2023-10-17 RX ADMIN — OLIVE OIL AND SOYBEAN OIL 250 ML: 16; 4 INJECTION, EMULSION INTRAVENOUS at 19:55

## 2023-10-17 RX ADMIN — FENTANYL CITRATE 25 MCG: 50 INJECTION INTRAMUSCULAR; INTRAVENOUS at 09:34

## 2023-10-17 RX ADMIN — PHENYLEPHRINE HYDROCHLORIDE 100 MCG: 10 INJECTION INTRAVENOUS at 09:59

## 2023-10-17 RX ADMIN — GABAPENTIN 300 MG: 300 CAPSULE ORAL at 21:53

## 2023-10-17 RX ADMIN — DEXMEDETOMIDINE HYDROCHLORIDE 0.6 MCG/KG/HR: 400 INJECTION INTRAVENOUS at 15:49

## 2023-10-17 RX ADMIN — TRIAMCINOLONE ACETONIDE: 1 OINTMENT TOPICAL at 20:11

## 2023-10-17 RX ADMIN — HYDROCORTISONE: 25 CREAM TOPICAL at 20:11

## 2023-10-17 RX ADMIN — HEPARIN SODIUM AND DEXTROSE 1900 UNITS/HR: 10000; 5 INJECTION INTRAVENOUS at 15:43

## 2023-10-17 RX ADMIN — Medication 5 MG: at 20:01

## 2023-10-17 RX ADMIN — NOREPINEPHRINE BITARTRATE 0.03 MCG/KG/MIN: 1 INJECTION, SOLUTION, CONCENTRATE INTRAVENOUS at 08:22

## 2023-10-17 ASSESSMENT — ACTIVITIES OF DAILY LIVING (ADL)
ADLS_ACUITY_SCORE: 45

## 2023-10-17 NOTE — PLAN OF CARE
Pupils unequal and reactive. Sedated on dex. Moves BUE and RLE. Follows simple commands.SR HR 70-80s. MAP >65. SBP soft while in deep sleep. Afebrile.   CMV 40%/450/14/5  Moderate amount thick cloudy white oral secretions,  Small  thick/thin cloudy white from ETT. NG- NPO @ midnight . No BM. Anuric- On HD. Heparin stopped @ 0330 am.  TPN and Lipids running.     Plan: Monitor hemodynamics and RTOR for AKA today.

## 2023-10-17 NOTE — ANESTHESIA PROCEDURE NOTES
Arterial Line Procedure Note    Pre-Procedure   Staff -        Anesthesiologist:  Dao Fischer MD       Resident/Fellow: Bobby Denny MD       Performed By: resident       Location: OR       Pre-Anesthestic Checklist: patient identified, IV checked, risks and benefits discussed, informed consent, monitors and equipment checked, pre-op evaluation and at physician/surgeon's request  Timeout:       Correct Patient: Yes        Correct Procedure: Yes        Correct Site: Yes        Correct Position: Yes   Line Placement:   This line was placed Post Induction  Procedure   Procedure: arterial line       Laterality: right       Insertion Site: radial.  Sterile Prep        Standard elements of sterile barrier followed       Skin prep: Chloraprep  Insertion/Injection        Technique: Seldinger Technique and Chapin's test completed        Catheter Type/Size: 20 G, 12 cm  Narrative         Secured by: suture       Tegaderm dressing used.       Complications: None apparent,        Arterial waveform: Yes        IBP within 10% of NIBP: Yes

## 2023-10-17 NOTE — PLAN OF CARE
Major Shift Events: AKA procedure completed; Alert/Drowsy; able to follow simple commands; VSS; tele NSR with intermittent PAC's PVC's; tolerating pressure support 5/5; post pyloric feeding tube placed, tube feeds initiated @ 10cc/hr @ 1600, no BM today; anuric; TPN lipids running; heparin @ 1900units (stop @ 0400 (potential ABD closure tomorrow)), precedex @ 0.6     Plan: ABD skin closure tomorrow?     For vital signs and complete assessments, please see documentation flowsheets.       Marek Nolan RN, BSN

## 2023-10-17 NOTE — PROCEDURES
Small Bowel Feeding Tube Placement Assessment  Reason for Feeding Tube Placement: Enteral access for nutrition and medication administration  Cortrak Start Time: 1325   Cortrak End Time: 1350  Medicine Delivered During Procedure: 2% viscous lidocaine   Placement Successful: Yes, presumed post-pyloric per Cortrak tracing pending AXR verification    Procedure Complications: Kinking and coiling of FT initially with other large bore NGT in place. Writer removed large bore NGT with permission from SICU. Some difficulty passing midline. Mild pt agitation, grimacing, moving extremities, RR momentarily increasing into 30s a couple occasions.    Final Placement Sameer at exit of nare 98 cm  Face to Face time with patient: 35 minutes total with set-up, explanation, clean-up    Bridle Placement:   Reason for bridle placement: Securement of nasoenteric FT   Medicine delivered during procedure: lidocaine gel   Procedure: Successful  Location of top of clip on FT: @ 100 cm marker   Condition of nose/skin at time of bridle placement: Unremarkable   Face to Face time with patient: <5 minutes.    Ashely Johnson RD, LD  Pager: [phone]; Ascom: *[phone]

## 2023-10-17 NOTE — ANESTHESIA POSTPROCEDURE EVALUATION
Patient: Alfredo Burnham    Procedure: Procedure(s):  AMPUTATION, ABOVE KNEE and Abdominal wound vac exchange       Anesthesia Type:  General    Note:  Disposition: Inpatient   Postop Pain Control: Uneventful            Sign Out: Well controlled pain   PONV: No   Neuro/Psych: Uneventful            Sign Out: Acceptable/Baseline neuro status   Airway/Respiratory: Uneventful            Sign Out: AIRWAY IN SITU/Resp. Support               Airway in situ/Resp. Support: ETT   CV/Hemodynamics: Uneventful            Sign Out: Acceptable CV status; No obvious hypovolemia; No obvious fluid overload   Other NRE: NONE   DID A NON-ROUTINE EVENT OCCUR? No           Last vitals:  Vitals:    10/17/23 0600 10/17/23 0700 10/17/23 0800   BP: (!) 86/61 101/67 103/68   Pulse: 81 94 96   Resp: 18 17 27   Temp:   36.8  C (98.3  F)   SpO2: 100% 99% 99%       Electronically Signed By: Dao Fischer MD  October 17, 2023  11:47 AM

## 2023-10-17 NOTE — ANESTHESIA CARE TRANSFER NOTE
Patient: Alfredo Burnham    Procedure: Procedure(s):  AMPUTATION, ABOVE KNEE and Abdominal wound vac exchange       Diagnosis: Critical limb ischemia of left lower extremity (H) [I70.222]  Diagnosis Additional Information: No value filed.    Anesthesia Type:   General     Note:    Oropharynx: endotracheal tube in place  Level of Consciousness: iatrogenic sedation    Level of Supplemental Oxygen (L/min / FiO2): 10  Independent Airway: airway patency not satisfactory and stable  Dentition: dentition unchanged  Vital Signs Stable: post-procedure vital signs reviewed and stable  Report to RN Given: handoff report given  Patient transferred to: ICU    ICU Handoff: Call for PAUSE to initiate/utilize ICU HANDOFF, Identified Patient, Identified Responsible Provider, Reviewed the Pertinent Medical History, Discussed Surgical Course, Reviewed Intra-OP Anesthesia Management and Issues during Anesthesia, Set Expectations for Post Procedure Period and Allowed Opportunity for Questions and Acknowledgement of Understanding      Vitals:  Vitals Value Taken Time   BP     Temp     Pulse     Resp     SpO2         Electronically Signed By: Bobby Denny MD  October 17, 2023  11:33 AM

## 2023-10-17 NOTE — PROGRESS NOTES
CLINICAL NUTRITION SERVICES - BRIEF NOTE      Reason for RD note: Provider order for Cortrak FT placement     New Findings/Chart Review:  General: Pt back from OR for AKA today. Adjusted IBW of 67 kg (-5% for AKA). Dosing wt of 72 kg remains appropriate    Nutrition support: TPN + trophic TF    Volume:  1200 mL (concentrated)   Dextrose: 180 g   AA: 140 g   Lipids: 250 ml 20% clinolipid daily     NovaSource Renal @ 10 mL/hr (trophic) via NGT    Enteral Access: Writer placed ppFT placement today, AXR pending    Oral Diet/Intake: NPO    Renal: HD    Respiratory: Intubated    GI: Last BM 10/14 - on scheduled Miralax & senna-docusate    Labs: Na+ 132 (L), K+ 3.4 (WNL), Phos 4.3 (WNL), Mg++ 1.9 (WNL), BUN 74.9 (H), Cr 4.26 (H)     Meds: Reviewed, notable for: High dose sliding scale insulin     Interventions:  Continue TPN as ordered - messaged PharmD about no change.     Ordered EN support (standard formula given lytes have improved):   Osmolite 1.5 Juvencio (or equivalent) @ goal of  50ml/hr  (1200ml/day) + 1 pkt ProSource TF20 TID (3 pkts total) provides: 2040 kcals (28 kcal/kg), 135 g PRO (1.9 g pro/kg), 914 ml free H20, 244 g CHO, and 0 g fiber daily.   - Once feeding tube verified ok for use by SICU, initiate @ 10 mL/hr and advance by 10 mL q8hr as tolerated to goal rate.   - Do not start or advance unless K+ >/= 3, Mg++ >/=1.5, and phos >/=1.9  - 30 mL q4hr fluid flushes for tube patency. Additional fluids and/or adjustments per MD.   - (To start 10/19): Multivitamin/mineral (15 mL/day via FT) to help ensure micronutrient needs being met with suspected hypermetabolic demands and potential interruptions to TF infusions.  - If gastric access: HOB >30 degrees.     Post-pyloric feeding tube placement    Future/Additional Recommendations:  Monitor tolerance to EN support. If tolerating TF of at least 30 mL/hr, discontinue TPN and run out current bag.    If renal formula needed, rec: [facility] Renal (or equivalent) @ 35  ml/hr (840 ml) + 3 pkts Prosource TF20 provides 1920 kcal (27 kcal/kg), 136 g pro (1.9 g/kg), 154 g CHO, 602 ml free water, and 0 g fiber daily.      Nutrition will continue to follow per protocol.    Ashely Johnson RD, LD  Pager: [phone]; Ascom: *[phone]

## 2023-10-17 NOTE — CONSULTS
"    Interventional Radiology  [facility] Consult Service Note  10/17/23   9:17 AM    Consult Requested: Tunneled central venous catheter placement.    Recommendations/Plan:    Patient will be added to IR schedule on 10/18/23 for a tunneled central venous catheter placement. Existing non-tunneled catheter will be removed at the time of tunneled CVC placement.     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 He has an existing right internal jugular non-tunneled CVC in place. Patient's Nephrology team requesting tunneled CVC placement for expected long-term HD.    Labs WNL for procedure.   Preprocedural orders entered, as well as orders for procedure, NPO status, and pre procedure IV antibiotics.   Consent will be done prior to procedure.     Please contact the IR charge RN at [phone] for estimated time of procedure.     Recommendations were reviewed with requesting team (ALTAGRACIA Smith).        Pertinent Imaging Reviewed: CT (10/13/23).    Expected date of discharge:  TBD.    Vitals:   /68 (BP Location: Left arm)   Pulse 96   Temp 98.3  F (36.8  C) (Axillary)   Resp 27   Ht 1.727 m (5' 8\")   Wt 79.8 kg (175 lb 14.8 oz)   SpO2 99%   BMI 26.75 kg/m      Pertinent Labs:   Lab Results   Component Value Date    WBC 13.2 (H) 10/17/2023    WBC 13.5 (H) 10/16/2023    WBC 13.8 (H) 10/15/2023    WBC 7.0 02/18/2009     Lab Results   Component Value Date    HGB 7.5 10/17/2023    HGB 8.5 10/16/2023    HGB 7.8 10/16/2023    HGB 15.1 02/18/2009     Lab Results   Component Value Date     10/17/2023     10/16/2023     10/15/2023     02/18/2009     Lab Results   Component Value Date    INR 1.32 (H) 10/16/2023    INR 0.9 10/15/2009    PTT 50 (H) 10/16/2023    PTT 30 02/18/2009     Lab Results   Component Value Date    POTASSIUM 3.4 10/17/2023    POTASSIUM 3.7 10/13/2023    POTASSIUM 2.5 (LL) 09/25/2023    " POTASSIUM 3.9 02/08/2011        COVID-19 Antibody Results, Testing for Immunity           No data to display              COVID-19 PCR Results           No data to display                Josiah Ryan PA-C  Interventional Radiology  Pager: [phone].

## 2023-10-17 NOTE — PROGRESS NOTES
Cardiology    Due to an issue with the ADDIS reporting system, the transfer of the report from the imaging software to Epic may be delayed. It is attached here for the clinical care teams' reference.      [facility], [place]    Cardiovascular Division  [facility]                Interpretation SummaryNo thromboembolic source or shunt identified in cardiac chambers.   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    Procedure  Transesophageal Echocardiogram with color and spectral Doppler performed. Bubble study performed. Procedure location Patient Floor. Informed consent for Transesophegeal echo obtained. ADDIS Probe #67 was used during the procedure. Patient was sedated using Fentanyl 100 mcg. Patient was sedated using Versed 3 mg. The heart rate, respiratory rate, oxygen saturations, blood pressure, and response to care were monitored throughout the procedure with the assistance of the nurse. I determined this patient to be an appropriate candidate for the planned sedation and procedure and have reassessed the patient immediately prior to sedation and procedure. Total sedation time: 24 minutes of continuous bedside 1:1 monitoring. The Transducer was inserted without difficulty . The patient tolerated the procedure well. Complications None. The patient's rhythm is normal sinus. Good quality two-dimensional was performed and interpreted. Good quality color and spectral Doppler were performed and interpreted.   Left Ventricle  Global and regional left ventricular function is normal with an EF of 55-60%. Left ventricular size is normal.   Right Ventricle  Global right ventricular function is normal. The right ventricle is normal size.   Atria  Both atria appear normal. The left atrial appendage Doppler velocities are normal. The left atrial appendage is normal. It is free of spontaneous echo contrast and thrombus. There was no shunt at the atrial septal level as assessed by  color Doppler and agitated saline bubble study at rest and with Valsalva maneuver. A catheter is noted in the right atrium.   Mitral Valve  The mitral valve is normal. Trace mitral insufficiency is present.   Aortic Valve  The aortic valve is tricuspid. Mild aortic valve sclerosis is present.   Tricuspid Valve  The tricuspid valve is normal. Trace tricuspid insufficiency is present.   Pulmonic Valve  The pulmonic valve is normal. Trace pulmonic insufficiency is present.   Vessels  The aorta root is normal. 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 The RUPV Doppler shows normal velocity waveform . The right lower pulmonary vein cannot be assessed. The LUPV Doppler shows normal velocity waveform . The LLPV Doppler shows normal velocity waveform .   Pericardium  No pericardial effusion is present.   Compared to Previous Study  This study was compared with the study from 09/28/2023 . There is better visualization of cardiac structures but no significant change noted.

## 2023-10-17 NOTE — OP NOTE
Date of surgery: 17 October 2023    Location: Operating room, [facility]    Surgeon:Dr Boucher    Assistant: Dr Will Donald    Anesthesiologist: Staff anesthesia    Anesthesia: General anesthesia    Preop diagnosis:  Ischemic left lower extremity  Status post open repair of ruptured aaa  Open abdomen    Postop diagnosis: Same    Procedure:  Left above-knee amputation  Removal and replacement of new abdominal wound VAC    Indication: I am asked by my colleague Dr. Kandace Mcleod to cover this case.  He is currently intubated in the SICU and is postop open repair of ruptured AAA.  Once he was hemodynamically stable he underwent an attempted thrombectomy of his left lower extremity however arterial flow could not be successfully restored as the muscles had proceeded to infarction.  He has had a dead leg in the ICU and this is demarcated to just below the level of the knee.  Consent was obtained from his wife for a left above-knee amputation with change of his abdominal wound VAC whilst he was in the OR.  The risks and benefits have been discussed with the patient's wife.    Procedure: The patient was brought intubated from the surgical ICU and placed supine on the table.  The dressings below the left knee were not removed and the wound VAC tubing for the fasciotomy site was sharply cut.  The left thigh and knee were then prepped with Betadine and draped in sterile fashion with the leg below the knee wrapped in sterile drape which was secured with Coban.  Timeout was performed with the anesthetic and nursing staff.  IV clindamycin had been given for preop antibiotics.  1 handsbreadth proximal to the patella a fishmouth incision was marked on the skin.  #10 blade was used for incision down to the subcutaneous layer.  Copious amounts of edematous fluid was noted.  The greater saphenous vein was identified and divided between 0 silk ties with thrombus noted in the intervening Segment.  Further  dissection was above electrocautery and Metzenbaum scissors.  The muscles were sharply transected with Bovie electrocautery.  Paired Femoral vein and superficial femoral artery were identified and dissected free.  The superficial femoral artery was divided between clamps and 0 silk suture ligature placed proximally.  0 silk was used to tie this off distally.  The femoral vein was divided between 0 silk ties.  There were a few large profunda branches that were clamped and divided with 0 silk ties.  The remainder of the leg was then transected with Bovie electrocautery.  Bovie was used to score the femur and a periosteal elevator used  to clear the femur.  This was sharply transected with the hand-held power saw..  The leg was then removed off the field.  The superior-anterior end of the femur was then cut at 45 degree beveled angle with the power saw.  The edges were smoothed with a rasp.  No bleeding was noted from the marrow.  Attention was then turned to hemostasis and Bovie electrocautery were used for the few remaining muscular bleeding points.  The open amputation site was then irrigated with copious amounts of saline.  Hemostasis was noted to be intact.  The fascia was closed using continuous 0 Vicryl.  The skin was closed using interrupted 3-0 nylon vertical mattress sutures and staples.  Xeroform, 4 x 4 pads, ABD pads, Kerlix and Ace wrap were then used to dress the incision.  Please note that there were 2 superficial areas of skin ulceration that were very superficial that were dressed with Xeroform and these were present and noted prior to incision.  1 was on the anterior surface of the thigh and the second on the posterior.  The leg drapes were  removed.  Please note I was informed the instrument and sponge count were correct prior to closing the incision.  The abdominal wound VAC was then removed.  The abdomen was then prepped with Betadine and draped appropriately.  A new wound VAC was placed without  issue..  The patient was then transferred back to the ICU intubated.    EBL: 30 ml

## 2023-10-17 NOTE — PROGRESS NOTES
SURGICAL ICU PROGRESS NOTE  10/17/2023        Date of Service (when I saw the patient): 10/17/2023    ASSESSMENT:  Alfredo Burnham is a 68 year old male who was admitted to the SICU on 9/24/2023 s/p open AAA repair. Patient has a history of HTN and previous TIA. He presented to the ED on 9/24 with right sided abdominal pain and was found to have a contained, ruptured AAA on CT. 30 min super-celiac clamp time. Prolonged intra-renal clamp. 9/25 s/p flex sig showing rectal ischemia, abdominal washout showing a hemostatic AAA graft and no evidence of transmural colonic or small bowel ischemia, and an unsuccessful LLE embolectomy. 9/26 s/p repeat abdominal washout, retroperitoneal closure, LLE wound washout. 9/29 s/p repeat abd washout, bowel appeared well perfused w/o notable ischemia. OR 10/2 for abdominal fascia closure. Extubated 10/4. Unequal pupils and R facial droop, MRI brain showed multiple small infarcts, likely embolic. Patient was transferred out of the surgical ICU on 10/12. On 10/13, patient completed dialysis run and suddenly started experiencing desaturation. Patient was emergently intubated for Acute Hypoxic Respiratory Failure in dialysis unit and transferred to SICU for further cares.         CHANGES and MAJOR THINGS TODAY:   - L AKA today. Heparin and tube feeds held prior to surgery  - NJ tube placement after OR  - IR consulted for placement of tunneled dialysis line    PLAN:    Neurological:  # Sedation  - Precedex 0.4  # Acute pain  RAPS felt that a nerve block prior to L AKA while pt is intubated and sedated is unnecessary; will consider reaching out again when he is close to extubating post-op.   - Multi-modal pain control effective (scheduled tylenol, gabapentin TID, oxycodone PRN, dilaudid PRN, robaxin PRN)     #Facial droop, left lacunar infarct  #Punctate lesions of cerebellum   - Follow-up with stroke neurology 4-6 weeks after discharge  - ADDIS with no evidence of embolic source     #  Delirium  # Mixed metabolic encephalopathy   # Sedation, RASS goal 0 to -1  - Monitor neurological status. Delirium preventions and precautions  - Melatonin q6pm and seroquel for sleep aid  - Atarax PRN for anxiety      Pulmonary:  # Acute hypoxic respiratory failure  # Re-intubation 10/13 (after prev extubation 10/3)  # Pulmonary embolism   Satting well overnight, %.   - Mechanical ventilation  / FiO2 40% / RR 14 / PEEP 5  - Work on weaning ventilation as tolerated  - Continue PSTs     Cardiovascular:    # Contained AAA rupture s/p open repair 9/25  # Acute critical limb ischaemia of LLE  # Hx of HTN  # Tachycardia  # L iliacus hematoma  # Peripancreatic hematoma   Hgb at 7.5 this AM. Patient is not tachycardic or hypotensive, unlikely to be related to bleeding.  - Continue to monitor hemodynamic status; Goal MAP >65, SBP <160  - Ischaemic LLE, L AKA Tuesday 10/17  - High dose heparin for PE (held at 4AM pre-op). Will discuss with vascular surgery when to restart  - Continue Lipitor 10 mg     GI/Nutrition:    # s/p open AAA repair, wound vac device in place  # Post-operative melena, resolved  # Rectal ischemia, concern for, resolved  # Aspiration event 10/9  NG output minimal - 300cc yesterday, 75cc since midnight  - Strict NPO  - NGT to LIS  - BR with senna BID and miralax daily  - continue TPN for nutrition  - Will plan to place NJ either today after OR or tomorrow  - Tube feeds held at MN pre-op, will discuss restarting after OR  - Monitor electrolytes     Renal/Fluids/Electrolytes:  # Protein calorie deficit malnutrition, risk of   # Acute kidney injury  # Oliguria  # Haemodialysis  - No IVF  - dialysis per nephrology; last iHD 10/16  - Dialysis line not functioning well 10/16, will discuss with IR and vascular surgery about placement of a tunneled line  - Strict intake and output     Endocrine:  # Stress hyperglycaemia  - High SSI for glucose control  - q6h BG checks     Infectious disease:   #  oropharyngeal candidiasis  # Likely aspiration event during ADDIS  WBC stable at 13.2. Sputum culture collected, growing 1+ GPCs.  - Cefepime discontinued 10/16 is setting of possible DRESS reaction. Started levaquin 750 loading dose, 500 q48h  - Nystatin suspension for thrush     Hematology:    # Acute blood loss anemia  Hgb 7.5 this morning.   - INR 1.32 (10/16)  - Heparin gtt held at 4am     MSK:  # Weakness and deconditioning of critical illness  # Left lower limb ischemia   - Passive ROM at bedside with RN  Marisol WINSLOW Tuesday 10/17  - Physical therapy when possible  - Podiatry consulted for nail clipping      Skin:  # Diffuse blanching maculopapular rash  Rash present since ~10/13 on abdomen, trunk, thighs. No medications per pharm thought to be the cause. Derm evaluated and felt it was a morbilliform eruption vs less likely DRESS syndrome.   - CBC with differential daily  - LFTs daily  - Start triamcinolone 0.1% ointment, hydrocortisone cream twice daily to all body areas with rash  - Monitor closely for progression or change in skin findings.  - Consider peripheral smear to assess for reactive lymphocytes    General Cares/Prophylaxis:    DVT Prophylaxis: Pneumatic Compression Devices and heparin gtt (held)  GI Prophylaxis: PPI  Restraints: Restraints for medical healing needed: YES     Lines/ tubes/ drains:  - ETT  - Right internal jugular double lumen  - Left triple lumen suvbclavian central line   - NGT  - PIVs in RUE  - Abdominal wound vac  - LLE wound vac    Disposition:  - Surgical ICU     Patient seen, findings and plan discussed with surgical ICU staff, Dr. Mccall.    Lucas Niño MD  Surgical ICU    ====================================  INTERVAL HISTORY:   No acute events overnight. Following commands, moving all extremities. Rash appears to be improving.    ROS unable to be performed due to sedation.       OBJECTIVE:   1. VITAL SIGNS:   Temp:  [98.8  F (37.1  C)-100.4  F (38  C)] 99.1  F (37.3   C)  Pulse:  [] 84  Resp:  [17-37] 28  BP: ()/() 98/69  FiO2 (%):  [40 %] 40 %  SpO2:  [91 %-100 %] 100 %  Vent Mode: CMV/AC  (Continuous Mandatory Ventilation/ Assist Control)  FiO2 (%): 40 %  Resp Rate (Set): 14 breaths/min  Tidal Volume (Set, mL): 450 mL  PEEP (cm H2O): 5 cmH2O  Pressure Support (cm H2O): 5 cmH2O  Resp: 28      2. INTAKE/ OUTPUT:   I/O last 3 completed shifts:  In: 2121.4 [I.V.:601.4; NG/GT:320]  Out: 2000 [Emesis/NG output:300; Drains:100; Other:1600]    3. PHYSICAL EXAMINATION:  General: intubated, sedated          HEENT: normocephalic,atraumatic  Neuro: sedated on propofol, occasionally opens eyes, moving upper extremities spontaneously   Pulm/Resp: intubated  CV: normotensive, off pressors  Abdomen: Abdomen mildly distended. Midline laparotomy incision with wound vac in place with scant serosanguinous output in canister.  abdominal binder in place.  Gu: decreased scrotal swelling.  MSK/Extremities: Ischemic LLE, wound vac in place, scant dark brown output in canister. No peripheral edema. R DP pulse intact. BUE with palpable radial pulses.     4. INVESTIGATIONS:   Arterial Blood Gases   Recent Labs   Lab 10/14/23  0111   PH 7.41  7.41   PCO2 41  41   PO2 85  85   HCO3 26  26     Complete Blood Count   Recent Labs   Lab 10/17/23  0335 10/16/23  1616 10/16/23  0431 10/15/23  2353 10/15/23  1358 10/15/23  0353 10/14/23  1426   WBC 13.2*  --  13.5*  --   --  13.8* 16.5*   HGB 7.5* 8.5* 7.8* 7.7*   < > 7.3* 7.8*   *  --  145*  --   --  142* 162    < > = values in this interval not displayed.     Basic Metabolic Panel  Recent Labs   Lab 10/17/23  0340 10/17/23  0335 10/16/23  2328 10/16/23  2135 10/16/23  0811 10/16/23  0431 10/15/23  0359 10/15/23  0353 10/14/23  0400 10/14/23  0349   NA  --  132*  --   --   --  131*  --  136  --  137   POTASSIUM  --  3.4  --   --   --  4.2  --  4.0  --  4.0   CHLORIDE  --  94*  --   --   --  91*  --  96*  --  97*   CO2  --  20*  --    --   --  15*  --  21*  --  26   BUN  --  74.9*  --   --   --  142.0*  --  95.8*  --  59.6*   CR  --  4.26*  --   --   --  7.20*  --  5.77*  --  4.19*   * 152* 120* 118*   < > 127*  135*   < > 123*   < > 150*    < > = values in this interval not displayed.     Liver Function Tests  Recent Labs   Lab 10/17/23  0335 10/16/23  0431 10/15/23  0353 10/14/23  0349 10/13/23  2130 10/13/23  2034   AST 51* 59* 65* 66* 89*  --    ALT 32 33 34 39 47  --    ALKPHOS 87 82 98 82 115  --    BILITOTAL 0.4 0.4 0.4 0.4 0.5  --    ALBUMIN 2.5* 2.5* 2.5* 3.1* 3.2*  --    INR  --  1.32* 1.32*  --  1.20* 1.18*     Pancreatic Enzymes  Recent Labs   Lab 10/14/23  1017   LIPASE 51     Coagulation Profile  Recent Labs   Lab 10/16/23  0655 10/16/23  0431 10/15/23  0353 10/13/23  2130 10/13/23  2034   INR  --  1.32* 1.32* 1.20* 1.18*   PTT 50*  --  >240* 125* 121*         5. RADIOLOGY:   Recent Results (from the past 24 hour(s))   XR Chest Port 1 View    Narrative    Portable chest    INDICATION: Concern for ventilator-assisted pneumonia in the setting  of acute respiratory failure    COMPARISON: Chest CT 10/13/2023. Plain film 10/13/2023.    FINDINGS: Continued prominent retrocardiac density and silhouetting  the left hemidiaphragm noted. Increased haziness over the left lower  lung field and the left costophrenic angle is increasingly obscured.  Right costophrenic angle remains sharp. Other streaky densities  throughout the lungs, left greater than right, are also overall  slightly increased on the left and similar on the right.  Support devices include endotracheal tube with tip approximately 2.1  cm above the lissa an right IJ catheter tip in the low SVC. NG/OG  tube side hole projects in the stomach an tip just beyond the inferior  margin of the image. Degenerative spurring in the thoracolumbar spine.      Impression    IMPRESSION: Increasing edema likely in the left lower lung with small  pleural effusion and retrocardiac  atelectasis. Recommend follow-up to  clearing to exclude superimposed developing infection.    HYACINTH SUAZO MD         SYSTEM ID:  [id number]   XR Chest Port 1 View    Narrative    Exam: XR CHEST PORT 1 VIEW, 10/16/2023 2:45 PM    Indication: Assess HD line; not working    Comparison: Same day    Findings:   Right internal jugular central venous catheter tip at the mid SVC.  Left subclavian central venous catheter tip at the central left  brachiocephalic vein. Endotracheal tube tip at the mid thoracic  trachea. Enteric tube courses into the stomach and below the  field-of-view. Stable cardiomediastinal silhouette. The pulmonary  vasculature is indistinct. Continued small left pleural effusion. No  pneumothorax. Stable perihilar and basilar predominant mixed  interstitial and patchy airspace opacities.      Impression    Impression:   1. Right internal jugular central venous catheter tip in stable  position at the mid SVC.  2. Continued small left pleural effusion and perihilar and basilar  predominant mixed interstitial and patchy airspace opacities.    SABINO MURRIETA DO         SYSTEM ID:  [id number]

## 2023-10-17 NOTE — BRIEF OP NOTE
[facility]    Brief Operative Note    Pre-operative diagnosis: Critical limb ischemia of left lower extremity (H) [I70.222]  Post-operative diagnosis Same as pre-operative diagnosis    Procedure: AMPUTATION, ABOVE KNEE and Abdominal wound vac exchange, Left - Leg    Surgeon: Surgeon(s) and Role:     * Ash Boucher MBBS - Primary     * Ada Donald MD - Resident - Assisting  Anesthesia: General   Estimated Blood Loss: 200 ml    Drains: None  Specimens:   ID Type Source Tests Collected by Time Destination   1 : Amputated Left Leg, Above Knee Amputation, Non-Traumatic Leg, Above Knee, Left SURGICAL PATHOLOGY EXAM Ash Boucher MBBS 10/17/2023  9:48 AM      Findings:   None.  Complications: None.  Implants: * No implants in log *

## 2023-10-17 NOTE — PROGRESS NOTES
Brief Nephrology note        Mr Burnham was in OR on my visit this am, no issue in labs pushing for run today but will plan for HD tomorrow.  Tunneled line tentatively planned for tomorrow as he has not shown signs of recovery and now has known renal infarcts which does impact chances and timeframe for recovery.      Bebeto Sesay, APRN CNS  Clinical Nurse Specialist  [phone]

## 2023-10-18 ENCOUNTER — APPOINTMENT (OUTPATIENT)
Dept: INTERVENTIONAL RADIOLOGY/VASCULAR | Facility: CLINIC | Age: 68
DRG: 268 | End: 2023-10-18
Attending: PHYSICIAN ASSISTANT
Payer: COMMERCIAL

## 2023-10-18 ENCOUNTER — APPOINTMENT (OUTPATIENT)
Dept: GENERAL RADIOLOGY | Facility: CLINIC | Age: 68
DRG: 268 | End: 2023-10-18
Payer: COMMERCIAL

## 2023-10-18 ENCOUNTER — ANESTHESIA (OUTPATIENT)
Dept: INTENSIVE CARE | Facility: CLINIC | Age: 68
DRG: 268 | End: 2023-10-18
Payer: COMMERCIAL

## 2023-10-18 ENCOUNTER — ANESTHESIA EVENT (OUTPATIENT)
Dept: INTENSIVE CARE | Facility: CLINIC | Age: 68
DRG: 268 | End: 2023-10-18
Payer: COMMERCIAL

## 2023-10-18 LAB
ABO/RH(D): NORMAL
ALBUMIN SERPL BCG-MCNC: 2.5 G/DL (ref 3.5–5.2)
ALP SERPL-CCNC: 96 U/L (ref 40–129)
ALT SERPL W P-5'-P-CCNC: 32 U/L (ref 0–70)
ANION GAP SERPL CALCULATED.3IONS-SCNC: 23 MMOL/L (ref 7–15)
ANTIBODY SCREEN: NEGATIVE
AST SERPL W P-5'-P-CCNC: 49 U/L (ref 0–45)
BASE EXCESS BLDV CALC-SCNC: -6.7 MMOL/L (ref -7.7–1.9)
BASO+EOS+MONOS # BLD AUTO: ABNORMAL 10*3/UL
BASO+EOS+MONOS NFR BLD AUTO: ABNORMAL %
BASOPHILS # BLD AUTO: ABNORMAL 10*3/UL
BASOPHILS # BLD MANUAL: 0.1 10E3/UL (ref 0–0.2)
BASOPHILS NFR BLD AUTO: ABNORMAL %
BASOPHILS NFR BLD MANUAL: 1 %
BILIRUB DIRECT SERPL-MCNC: 0.22 MG/DL (ref 0–0.3)
BILIRUB SERPL-MCNC: 0.3 MG/DL
BLD PROD TYP BPU: NORMAL
BLOOD COMPONENT TYPE: NORMAL
BUN SERPL-MCNC: 110 MG/DL (ref 8–23)
BURR CELLS BLD QL SMEAR: SLIGHT
CA-I BLD-MCNC: 4.3 MG/DL (ref 4.4–5.2)
CALCIUM SERPL-MCNC: 8 MG/DL (ref 8.8–10.2)
CHLORIDE SERPL-SCNC: 90 MMOL/L (ref 98–107)
CODING SYSTEM: NORMAL
CREAT SERPL-MCNC: 5.66 MG/DL (ref 0.67–1.17)
CROSSMATCH: NORMAL
DEPRECATED HCO3 PLAS-SCNC: 15 MMOL/L (ref 22–29)
EBV DNA # SPEC NAA+PROBE: <500 COPIES/ML
EBV DNA SPEC NAA+PROBE-LOG#: <2.7 {LOG_COPIES}/ML
EGFRCR SERPLBLD CKD-EPI 2021: 10 ML/MIN/1.73M2
EOSINOPHIL # BLD AUTO: ABNORMAL 10*3/UL
EOSINOPHIL # BLD MANUAL: 0.9 10E3/UL (ref 0–0.7)
EOSINOPHIL NFR BLD AUTO: ABNORMAL %
EOSINOPHIL NFR BLD MANUAL: 6 %
ERYTHROCYTE [DISTWIDTH] IN BLOOD BY AUTOMATED COUNT: 18.6 % (ref 10–15)
GLUCOSE BLDC GLUCOMTR-MCNC: 135 MG/DL (ref 70–99)
GLUCOSE BLDC GLUCOMTR-MCNC: 152 MG/DL (ref 70–99)
GLUCOSE BLDC GLUCOMTR-MCNC: 152 MG/DL (ref 70–99)
GLUCOSE BLDC GLUCOMTR-MCNC: 158 MG/DL (ref 70–99)
GLUCOSE BLDC GLUCOMTR-MCNC: 164 MG/DL (ref 70–99)
GLUCOSE BLDC GLUCOMTR-MCNC: 166 MG/DL (ref 70–99)
GLUCOSE BLDC GLUCOMTR-MCNC: 190 MG/DL (ref 70–99)
GLUCOSE BLDC GLUCOMTR-MCNC: 195 MG/DL (ref 70–99)
GLUCOSE SERPL-MCNC: 171 MG/DL (ref 70–99)
HCO3 BLDV-SCNC: 17 MMOL/L (ref 21–28)
HCT VFR BLD AUTO: 20.6 % (ref 40–53)
HGB BLD-MCNC: 6.8 G/DL (ref 13.3–17.7)
HHV6 DNA # SPEC NAA+PROBE: NOT DETECTED COPIES/ML
IMM GRANULOCYTES # BLD: ABNORMAL 10*3/UL
IMM GRANULOCYTES NFR BLD: ABNORMAL %
ISSUE DATE AND TIME: NORMAL
LACTATE SERPL-SCNC: 1.5 MMOL/L (ref 0.7–2)
LYMPHOCYTES # BLD AUTO: ABNORMAL 10*3/UL
LYMPHOCYTES # BLD MANUAL: 0.3 10E3/UL (ref 0.8–5.3)
LYMPHOCYTES NFR BLD AUTO: ABNORMAL %
LYMPHOCYTES NFR BLD MANUAL: 2 %
MAGNESIUM SERPL-MCNC: 2.5 MG/DL (ref 1.7–2.3)
MCH RBC QN AUTO: 29.7 PG (ref 26.5–33)
MCHC RBC AUTO-ENTMCNC: 33 G/DL (ref 31.5–36.5)
MCV RBC AUTO: 90 FL (ref 78–100)
METAMYELOCYTES # BLD MANUAL: 0.3 10E3/UL
METAMYELOCYTES NFR BLD MANUAL: 2 %
MONOCYTES # BLD AUTO: ABNORMAL 10*3/UL
MONOCYTES # BLD MANUAL: 0.7 10E3/UL (ref 0–1.3)
MONOCYTES NFR BLD AUTO: ABNORMAL %
MONOCYTES NFR BLD MANUAL: 5 %
NEUTROPHILS # BLD AUTO: ABNORMAL 10*3/UL
NEUTROPHILS # BLD MANUAL: 12 10E3/UL (ref 1.6–8.3)
NEUTROPHILS NFR BLD AUTO: ABNORMAL %
NEUTROPHILS NFR BLD MANUAL: 84 %
NRBC # BLD AUTO: 0 10E3/UL
NRBC BLD AUTO-RTO: 0 /100
O2/TOTAL GAS SETTING VFR VENT: 30 %
OXYHGB MFR BLDV: 91 % (ref 70–75)
PCO2 BLDV: 27 MM HG (ref 40–50)
PH BLDV: 7.42 [PH] (ref 7.32–7.43)
PHOSPHATE SERPL-MCNC: 5 MG/DL (ref 2.5–4.5)
PLAT MORPH BLD: ABNORMAL
PLATELET # BLD AUTO: 131 10E3/UL (ref 150–450)
PO2 BLDV: 63 MM HG (ref 25–47)
POLYCHROMASIA BLD QL SMEAR: SLIGHT
POTASSIUM SERPL-SCNC: 4.1 MMOL/L (ref 3.4–5.3)
PROT SERPL-MCNC: 5.6 G/DL (ref 6.4–8.3)
RBC # BLD AUTO: 2.29 10E6/UL (ref 4.4–5.9)
RBC MORPH BLD: ABNORMAL
SODIUM SERPL-SCNC: 128 MMOL/L (ref 135–145)
SPECIMEN EXPIRATION DATE: NORMAL
UFH PPP CHRO-ACNC: 0.33 IU/ML
UNIT ABO/RH: NORMAL
UNIT NUMBER: NORMAL
UNIT STATUS: NORMAL
UNIT TYPE ISBT: 5100
WBC # BLD AUTO: 14.3 10E3/UL (ref 4–11)

## 2023-10-18 PROCEDURE — 250N000009 HC RX 250: Performed by: SURGERY

## 2023-10-18 PROCEDURE — 85007 BL SMEAR W/DIFF WBC COUNT: CPT

## 2023-10-18 PROCEDURE — 74018 RADEX ABDOMEN 1 VIEW: CPT | Mod: 26 | Performed by: STUDENT IN AN ORGANIZED HEALTH CARE EDUCATION/TRAINING PROGRAM

## 2023-10-18 PROCEDURE — 370N000003 HC ANESTHESIA WARD SERVICE: Performed by: STUDENT IN AN ORGANIZED HEALTH CARE EDUCATION/TRAINING PROGRAM

## 2023-10-18 PROCEDURE — B4185 PARENTERAL SOL 10 GM LIPIDS: HCPCS | Mod: JZ | Performed by: SURGERY

## 2023-10-18 PROCEDURE — 85520 HEPARIN ASSAY: CPT | Performed by: SURGERY

## 2023-10-18 PROCEDURE — 86923 COMPATIBILITY TEST ELECTRIC: CPT

## 2023-10-18 PROCEDURE — 250N000013 HC RX MED GY IP 250 OP 250 PS 637: Performed by: SURGERY

## 2023-10-18 PROCEDURE — 200N000002 HC R&B ICU UMMC

## 2023-10-18 PROCEDURE — 83735 ASSAY OF MAGNESIUM: CPT | Performed by: PHYSICIAN ASSISTANT

## 2023-10-18 PROCEDURE — 97530 THERAPEUTIC ACTIVITIES: CPT | Mod: GP | Performed by: PHYSICAL THERAPIST

## 2023-10-18 PROCEDURE — P9016 RBC LEUKOCYTES REDUCED: HCPCS | Performed by: PHARMACIST

## 2023-10-18 PROCEDURE — 76937 US GUIDE VASCULAR ACCESS: CPT | Mod: 26 | Performed by: RADIOLOGY

## 2023-10-18 PROCEDURE — 258N000003 HC RX IP 258 OP 636: Performed by: CLINICAL NURSE SPECIALIST

## 2023-10-18 PROCEDURE — 250N000009 HC RX 250

## 2023-10-18 PROCEDURE — 99223 1ST HOSP IP/OBS HIGH 75: CPT | Mod: GC | Performed by: DERMATOLOGY

## 2023-10-18 PROCEDURE — 250N000011 HC RX IP 250 OP 636

## 2023-10-18 PROCEDURE — 82805 BLOOD GASES W/O2 SATURATION: CPT | Performed by: STUDENT IN AN ORGANIZED HEALTH CARE EDUCATION/TRAINING PROGRAM

## 2023-10-18 PROCEDURE — 02H633Z INSERTION OF INFUSION DEVICE INTO RIGHT ATRIUM, PERCUTANEOUS APPROACH: ICD-10-PCS | Performed by: RADIOLOGY

## 2023-10-18 PROCEDURE — 99152 MOD SED SAME PHYS/QHP 5/>YRS: CPT

## 2023-10-18 PROCEDURE — 90937 HEMODIALYSIS REPEATED EVAL: CPT

## 2023-10-18 PROCEDURE — 634N000001 HC RX 634: Mod: JZ | Performed by: CLINICAL NURSE SPECIALIST

## 2023-10-18 PROCEDURE — 82248 BILIRUBIN DIRECT: CPT

## 2023-10-18 PROCEDURE — 76937 US GUIDE VASCULAR ACCESS: CPT

## 2023-10-18 PROCEDURE — C1750 CATH, HEMODIALYSIS,LONG-TERM: HCPCS

## 2023-10-18 PROCEDURE — 250N000011 HC RX IP 250 OP 636: Mod: JZ | Performed by: STUDENT IN AN ORGANIZED HEALTH CARE EDUCATION/TRAINING PROGRAM

## 2023-10-18 PROCEDURE — 250N000011 HC RX IP 250 OP 636: Mod: JZ | Performed by: PHARMACIST

## 2023-10-18 PROCEDURE — 250N000011 HC RX IP 250 OP 636: Mod: JZ

## 2023-10-18 PROCEDURE — 86923 COMPATIBILITY TEST ELECTRIC: CPT | Performed by: PHARMACIST

## 2023-10-18 PROCEDURE — 999N000065 XR ABDOMEN PORT 1 VIEW

## 2023-10-18 PROCEDURE — 82330 ASSAY OF CALCIUM: CPT | Performed by: PHYSICIAN ASSISTANT

## 2023-10-18 PROCEDURE — 86900 BLOOD TYPING SEROLOGIC ABO: CPT | Performed by: PHARMACIST

## 2023-10-18 PROCEDURE — 99153 MOD SED SAME PHYS/QHP EA: CPT

## 2023-10-18 PROCEDURE — 36558 INSERT TUNNELED CV CATH: CPT | Mod: GC | Performed by: RADIOLOGY

## 2023-10-18 PROCEDURE — 99233 SBSQ HOSP IP/OBS HIGH 50: CPT | Mod: FS | Performed by: CLINICAL NURSE SPECIALIST

## 2023-10-18 PROCEDURE — 99291 CRITICAL CARE FIRST HOUR: CPT | Mod: GC | Performed by: SURGERY

## 2023-10-18 PROCEDURE — 77001 FLUOROGUIDE FOR VEIN DEVICE: CPT | Mod: 26 | Performed by: RADIOLOGY

## 2023-10-18 PROCEDURE — C9113 INJ PANTOPRAZOLE SODIUM, VIA: HCPCS | Mod: JZ

## 2023-10-18 PROCEDURE — 999N000157 HC STATISTIC RCP TIME EA 10 MIN

## 2023-10-18 PROCEDURE — 97164 PT RE-EVAL EST PLAN CARE: CPT | Mod: GP | Performed by: PHYSICAL THERAPIST

## 2023-10-18 PROCEDURE — 99418 PROLNG IP/OBS E/M EA 15 MIN: CPT | Performed by: CLINICAL NURSE SPECIALIST

## 2023-10-18 PROCEDURE — 94003 VENT MGMT INPAT SUBQ DAY: CPT

## 2023-10-18 PROCEDURE — 84100 ASSAY OF PHOSPHORUS: CPT | Performed by: PHYSICIAN ASSISTANT

## 2023-10-18 PROCEDURE — 99152 MOD SED SAME PHYS/QHP 5/>YRS: CPT | Mod: GC | Performed by: RADIOLOGY

## 2023-10-18 PROCEDURE — 83605 ASSAY OF LACTIC ACID: CPT | Performed by: PHYSICIAN ASSISTANT

## 2023-10-18 PROCEDURE — 250N000013 HC RX MED GY IP 250 OP 250 PS 637: Performed by: ANESTHESIOLOGY

## 2023-10-18 PROCEDURE — 250N000013 HC RX MED GY IP 250 OP 250 PS 637

## 2023-10-18 PROCEDURE — 250N000011 HC RX IP 250 OP 636: Performed by: STUDENT IN AN ORGANIZED HEALTH CARE EDUCATION/TRAINING PROGRAM

## 2023-10-18 PROCEDURE — 250N000009 HC RX 250: Performed by: STUDENT IN AN ORGANIZED HEALTH CARE EDUCATION/TRAINING PROGRAM

## 2023-10-18 PROCEDURE — 0JH63XZ INSERTION OF TUNNELED VASCULAR ACCESS DEVICE INTO CHEST SUBCUTANEOUS TISSUE AND FASCIA, PERCUTANEOUS APPROACH: ICD-10-PCS | Performed by: RADIOLOGY

## 2023-10-18 PROCEDURE — 80053 COMPREHEN METABOLIC PANEL: CPT

## 2023-10-18 PROCEDURE — 999N000253 HC STATISTIC WEANING TRIALS

## 2023-10-18 PROCEDURE — 99233 SBSQ HOSP IP/OBS HIGH 50: CPT | Mod: 79 | Performed by: DERMATOLOGY

## 2023-10-18 PROCEDURE — 250N000013 HC RX MED GY IP 250 OP 250 PS 637: Performed by: STUDENT IN AN ORGANIZED HEALTH CARE EDUCATION/TRAINING PROGRAM

## 2023-10-18 PROCEDURE — 85027 COMPLETE CBC AUTOMATED: CPT

## 2023-10-18 RX ORDER — ACETAMINOPHEN 325 MG/1
650 TABLET ORAL
Status: CANCELLED | OUTPATIENT
Start: 2023-10-18

## 2023-10-18 RX ORDER — HYDROMORPHONE HYDROCHLORIDE 1 MG/ML
0.3 INJECTION, SOLUTION INTRAMUSCULAR; INTRAVENOUS; SUBCUTANEOUS
Status: DISCONTINUED | OUTPATIENT
Start: 2023-10-18 | End: 2023-11-17 | Stop reason: HOSPADM

## 2023-10-18 RX ORDER — BUPIVACAINE HYDROCHLORIDE 2.5 MG/ML
INJECTION, SOLUTION EPIDURAL; INFILTRATION; INTRACAUDAL
Status: COMPLETED | OUTPATIENT
Start: 2023-10-18 | End: 2023-10-18

## 2023-10-18 RX ORDER — HEPARIN SODIUM 1000 [USP'U]/ML
3 INJECTION, SOLUTION INTRAVENOUS; SUBCUTANEOUS ONCE
Status: COMPLETED | OUTPATIENT
Start: 2023-10-18 | End: 2023-10-18

## 2023-10-18 RX ORDER — HEPARIN SODIUM 10000 [USP'U]/100ML
0-5000 INJECTION, SOLUTION INTRAVENOUS CONTINUOUS
Status: DISPENSED | OUTPATIENT
Start: 2023-10-18 | End: 2023-10-20

## 2023-10-18 RX ORDER — LIDOCAINE HYDROCHLORIDE 20 MG/ML
JELLY TOPICAL ONCE
Status: COMPLETED | OUTPATIENT
Start: 2023-10-18 | End: 2023-10-18

## 2023-10-18 RX ORDER — HEPARIN SODIUM 1000 [USP'U]/ML
3 INJECTION, SOLUTION INTRAVENOUS; SUBCUTANEOUS ONCE
Status: DISCONTINUED | OUTPATIENT
Start: 2023-10-18 | End: 2023-10-19

## 2023-10-18 RX ORDER — LIDOCAINE HYDROCHLORIDE 10 MG/ML
1-30 INJECTION, SOLUTION EPIDURAL; INFILTRATION; INTRACAUDAL; PERINEURAL
Status: COMPLETED | OUTPATIENT
Start: 2023-10-18 | End: 2023-10-18

## 2023-10-18 RX ORDER — METOCLOPRAMIDE HYDROCHLORIDE 5 MG/ML
10 INJECTION INTRAMUSCULAR; INTRAVENOUS
Status: DISCONTINUED | OUTPATIENT
Start: 2023-10-18 | End: 2023-10-18

## 2023-10-18 RX ADMIN — SODIUM CHLORIDE 300 ML: 9 INJECTION, SOLUTION INTRAVENOUS at 09:58

## 2023-10-18 RX ADMIN — ACETAMINOPHEN 975 MG: 325 TABLET, FILM COATED ORAL at 09:27

## 2023-10-18 RX ADMIN — INSULIN ASPART 1 UNITS: 100 INJECTION, SOLUTION INTRAVENOUS; SUBCUTANEOUS at 04:09

## 2023-10-18 RX ADMIN — LEVOFLOXACIN 500 MG: 5 INJECTION, SOLUTION INTRAVENOUS at 18:43

## 2023-10-18 RX ADMIN — CHLORHEXIDINE GLUCONATE 15 ML: 1.2 RINSE ORAL at 09:27

## 2023-10-18 RX ADMIN — OXYCODONE HYDROCHLORIDE 5 MG: 5 TABLET ORAL at 09:27

## 2023-10-18 RX ADMIN — TRIAMCINOLONE ACETONIDE: 1 OINTMENT TOPICAL at 09:30

## 2023-10-18 RX ADMIN — INSULIN ASPART 3 UNITS: 100 INJECTION, SOLUTION INTRAVENOUS; SUBCUTANEOUS at 20:34

## 2023-10-18 RX ADMIN — BUPIVACAINE HYDROCHLORIDE 20 ML: 2.5 INJECTION, SOLUTION EPIDURAL; INFILTRATION; INTRACAUDAL at 14:40

## 2023-10-18 RX ADMIN — HEPARIN SODIUM 5000 UNITS: 1000 INJECTION INTRAVENOUS; SUBCUTANEOUS at 17:42

## 2023-10-18 RX ADMIN — INSULIN ASPART 1 UNITS: 100 INJECTION, SOLUTION INTRAVENOUS; SUBCUTANEOUS at 10:03

## 2023-10-18 RX ADMIN — DEXMEDETOMIDINE HYDROCHLORIDE 0.6 MCG/KG/HR: 400 INJECTION INTRAVENOUS at 18:27

## 2023-10-18 RX ADMIN — HYDROCORTISONE: 25 CREAM TOPICAL at 20:32

## 2023-10-18 RX ADMIN — INSULIN ASPART 1 UNITS: 100 INJECTION, SOLUTION INTRAVENOUS; SUBCUTANEOUS at 15:11

## 2023-10-18 RX ADMIN — PANTOPRAZOLE SODIUM 40 MG: 40 INJECTION, POWDER, FOR SOLUTION INTRAVENOUS at 09:27

## 2023-10-18 RX ADMIN — NYSTATIN 500000 UNITS: 100000 SUSPENSION ORAL at 15:08

## 2023-10-18 RX ADMIN — SODIUM CHLORIDE 250 ML: 9 INJECTION, SOLUTION INTRAVENOUS at 09:58

## 2023-10-18 RX ADMIN — HYDROCORTISONE: 25 CREAM TOPICAL at 09:30

## 2023-10-18 RX ADMIN — ACETAMINOPHEN 975 MG: 325 TABLET, FILM COATED ORAL at 20:15

## 2023-10-18 RX ADMIN — NYSTATIN 500000 UNITS: 100000 SUSPENSION ORAL at 13:01

## 2023-10-18 RX ADMIN — HYDROMORPHONE HYDROCHLORIDE 0.3 MG: 1 INJECTION, SOLUTION INTRAMUSCULAR; INTRAVENOUS; SUBCUTANEOUS at 23:47

## 2023-10-18 RX ADMIN — EPOETIN ALFA-EPBX 4000 UNITS: 10000 INJECTION, SOLUTION INTRAVENOUS; SUBCUTANEOUS at 11:05

## 2023-10-18 RX ADMIN — MIDODRINE HYDROCHLORIDE 20 MG: 5 TABLET ORAL at 09:44

## 2023-10-18 RX ADMIN — OLIVE OIL AND SOYBEAN OIL 250 ML: 16; 4 INJECTION, EMULSION INTRAVENOUS at 20:14

## 2023-10-18 RX ADMIN — CHLORHEXIDINE GLUCONATE 15 ML: 1.2 RINSE ORAL at 19:47

## 2023-10-18 RX ADMIN — INSULIN ASPART 2 UNITS: 100 INJECTION, SOLUTION INTRAVENOUS; SUBCUTANEOUS at 00:19

## 2023-10-18 RX ADMIN — OXYCODONE HYDROCHLORIDE 5 MG: 5 TABLET ORAL at 19:47

## 2023-10-18 RX ADMIN — HEPARIN SODIUM 1600 UNITS/HR: 10000 INJECTION, SOLUTION INTRAVENOUS at 22:03

## 2023-10-18 RX ADMIN — TRIAMCINOLONE ACETONIDE: 1 OINTMENT TOPICAL at 20:32

## 2023-10-18 RX ADMIN — INSULIN ASPART 3 UNITS: 100 INJECTION, SOLUTION INTRAVENOUS; SUBCUTANEOUS at 23:51

## 2023-10-18 RX ADMIN — SENNOSIDES AND DOCUSATE SODIUM 1 TABLET: 50; 8.6 TABLET ORAL at 09:27

## 2023-10-18 RX ADMIN — ACETAMINOPHEN 975 MG: 325 TABLET, FILM COATED ORAL at 03:39

## 2023-10-18 RX ADMIN — NYSTATIN 500000 UNITS: 100000 SUSPENSION ORAL at 09:27

## 2023-10-18 RX ADMIN — POLYETHYLENE GLYCOL 3350 17 G: 17 POWDER, FOR SOLUTION ORAL at 09:46

## 2023-10-18 RX ADMIN — OXYCODONE HYDROCHLORIDE 5 MG: 5 TABLET ORAL at 09:21

## 2023-10-18 RX ADMIN — DEXMEDETOMIDINE HYDROCHLORIDE 0.6 MCG/KG/HR: 400 INJECTION INTRAVENOUS at 03:39

## 2023-10-18 RX ADMIN — ACETAMINOPHEN 975 MG: 325 TABLET, FILM COATED ORAL at 15:08

## 2023-10-18 RX ADMIN — HYDROMORPHONE HYDROCHLORIDE 0.3 MG: 1 INJECTION, SOLUTION INTRAMUSCULAR; INTRAVENOUS; SUBCUTANEOUS at 13:49

## 2023-10-18 RX ADMIN — MAGNESIUM SULFATE HEPTAHYDRATE: 500 INJECTION, SOLUTION INTRAMUSCULAR; INTRAVENOUS at 20:14

## 2023-10-18 RX ADMIN — HEPARIN SODIUM AND DEXTROSE 1900 UNITS/HR: 10000; 5 INJECTION INTRAVENOUS at 03:39

## 2023-10-18 RX ADMIN — DEXMEDETOMIDINE HYDROCHLORIDE 0.8 MCG/KG/HR: 400 INJECTION INTRAVENOUS at 23:44

## 2023-10-18 RX ADMIN — HYDROMORPHONE HYDROCHLORIDE 0.3 MG: 1 INJECTION, SOLUTION INTRAMUSCULAR; INTRAVENOUS; SUBCUTANEOUS at 14:30

## 2023-10-18 RX ADMIN — ATORVASTATIN CALCIUM 10 MG: 10 TABLET, FILM COATED ORAL at 19:47

## 2023-10-18 RX ADMIN — LIDOCAINE HYDROCHLORIDE 15 ML: 10 INJECTION, SOLUTION EPIDURAL; INFILTRATION; INTRACAUDAL; PERINEURAL at 17:45

## 2023-10-18 RX ADMIN — ONDANSETRON 4 MG: 2 INJECTION INTRAMUSCULAR; INTRAVENOUS at 03:57

## 2023-10-18 RX ADMIN — DEXMEDETOMIDINE HYDROCHLORIDE 0.7 MCG/KG/HR: 400 INJECTION INTRAVENOUS at 09:21

## 2023-10-18 RX ADMIN — NYSTATIN 500000 UNITS: 100000 SUSPENSION ORAL at 19:47

## 2023-10-18 RX ADMIN — Medication 1 MG: at 18:44

## 2023-10-18 ASSESSMENT — ACTIVITIES OF DAILY LIVING (ADL)
ADLS_ACUITY_SCORE: 45

## 2023-10-18 NOTE — PROGRESS NOTES
Nephrology Progress Note  10/18/2023       Alfredo Burnham is a 68 yom with complex hx of TIA, HTN, blindness who presented to [facility] 9/24 with severe abdominal pain radiating to flank and back, CT revealed AAA with a contained rupture.  Taken to OR for aortobiiliac bypass and resection of ruptured pararenal aneurysm.   Post op course complicated by hypotension requiring pressors and metabolic acidosis.  Baseline Cr 1.0 on presentation but on the rise to >5 at time of renal consult for MAYA management and possible RRT.       Interval History :   Mr Burnham had CRRT stopped 10/4, generally has been stable on iHD since then but had resp arrest immediately post HD run on 10/13 requiring return to ICU and intubation.  Had LLE amputation yesterday, doing well hemodynamically this am on dialysis.  Pulling 2L to essentially keep up with intake since last run without dropping BP's.  Appreciate IR placing tunneled line today for ongoing HD needs, I did notify pt's wife of our finding of renal infarcts on CT 10/14; Does not change plan of supportive cares and monitoring for recovery but will certainly take longer and does reduce the chances of renal recovery to some degree.      Assessment & Recommendations:   MAYA-Baseline Cr 1.0, ordered UA.  CT showed benign cyst but otherwise normal kidneys.  Cr on the rise since surgery, did receive contrast for CTA.  Urine microscopy showed granular casts suggesting ATN which will recover with time and stabilizing hemodynamics.  Started on CRRT 9/30 for volume and clearance with minimal UOP and rising Cr.                  -Started CRRT 9/30 for volume and clearance, stopped 10/4 and now running iHD PRN (generally on MWF) and watching for recovery.  Infarcts seen on CT 10/13 lowers chances of recovery.       -Appreciate IR placing tunneled line today.                  -Dialysis consent signed and scanned into media tab.      Volume-Wt on downtrend overall but up the past 2 days  "without HD, BP's reasonable overnight and has some edema so will plan to pull 2-3L to account for gains the past ~48h.      Electrolytes-K 4.1, bicarb 15 with high AG/negative lactate, running HD today.        BMD-Ca 8.0, Mg 2.5.  Phos noted to be high at 5.0, would use binder but we have stopped TF and starting TPN so binder would not be effective.  No phos in TPN.       Anemia-Hgb 6.8, ~14 on presentation, acute management per team.       Nutrition-On TPN, no phos in TPN formula.       Time spent: 40 minutes on this date of encounter for chart review, physical exam, medical decision making and co-ordination of care.      Seen and discussed with Dr Mccauley     Recommendations were communicated to primary team via verbal communication.        Bebeto Sesay, ALTAGRACIA CNS  Clinical Nurse Specialist  [phone]    Review of Systems:   I reviewed the following systems:  ROS not done due to lethargy    Physical Exam:   I/O last 3 completed shifts:  In: 2710.9 [I.V.:900.1; NG/GT:250]  Out: 25 [Drains:25]   /77   Pulse 89   Temp 98.6  F (37  C) (Axillary)   Resp 21   Ht 1.727 m (5' 8\")   Wt 79.8 kg (175 lb 14.8 oz)   SpO2 100%   BMI 26.75 kg/m       GENERAL APPEARANCE: Extubated, lethargic, in no distress.   EYES: No scleral icterus  Pulmonary: On vent 40%/8 of PEEP  CV: Regular rhythm, normal rate   - Edema +1-2 generalized, + scrotal edema.    GI: distended, nontender  MS: no evidence of inflammation in joints, no muscle tenderness  : No Lu  SKIN: no rash, warm, dry  NEURO: No focal deficits.         Labs:   All labs reviewed by me  Electrolytes/Renal -   Recent Labs   Lab Test 10/18/23  0935 10/18/23  0409 10/18/23  0344 10/17/23  0340 10/17/23  0335 10/16/23  0811 10/16/23  0431   NA  --   --  128*  --  132*  --  131*   POTASSIUM  --   --  4.1  --  3.4  --  4.2   CHLORIDE  --   --  90*  --  94*  --  91*   CO2  --   --  15*  --  20*  --  15*   BUN  --   --  110.0*  --  74.9*  --  142.0*   CR  --   --  " 5.66*  --  4.26*  --  7.20*   * 158* 171*   < > 152*   < > 127*  135*   OMAR  --   --  8.0*  --  8.2*  --  8.5*   MAG  --   --  2.5*  --  1.9  --  2.7*   PHOS  --   --  5.0*  --  4.3  --  7.7*    < > = values in this interval not displayed.       CBC -   Recent Labs   Lab Test 10/18/23  0344 10/17/23  1155 10/17/23  0335   WBC 14.3* 14.6* 13.2*   HGB 6.8* 7.3* 7.5*   * 130* 141*       LFTs -   Recent Labs   Lab Test 10/18/23  0344 10/17/23  0335 10/16/23  0431   ALKPHOS 96 87 82   BILITOTAL 0.3 0.4 0.4   ALT 32 32 33   AST 49* 51* 59*   PROTTOTAL 5.6* 5.5* 5.4*   ALBUMIN 2.5* 2.5* 2.5*       Iron Panel - No lab results found.        Current Medications:   acetaminophen  975 mg Oral or Feeding Tube Q6H    atorvastatin  10 mg Oral or Feeding Tube QPM    chlorhexidine  15 mL Mouth/Throat Q12H    hydrocortisone   Topical BID    insulin aspart  1-12 Units Subcutaneous Q4H    levofloxacin  500 mg Intravenous Q48H    lipids plant base  250 mL Intravenous Q24H    melatonin  1 mg Oral or Feeding Tube QPM    [START ON 10/19/2023] multivitamins w/minerals  15 mL Per Feeding Tube Daily    nystatin  500,000 Units Oral 4x Daily    pantoprazole  40 mg Per Feeding Tube QAM AC    polyethylene glycol  17 g Oral or Feeding Tube Daily    protein modular  1 packet Per Feeding Tube TID    senna-docusate  1 tablet Oral or Feeding Tube BID    sodium chloride 0.9%  1,000 mL Intravenous Once    triamcinolone   Topical BID      dexmedeTOMIDine 0.6 mcg/kg/hr (10/18/23 1045)    dextrose      dextrose      parenteral nutrition - ADULT compounded formula      parenteral nutrition - ADULT compounded formula 50 mL/hr at 10/18/23 0500

## 2023-10-18 NOTE — PROGRESS NOTES
CLINICAL NUTRITION SERVICES - REASSESSMENT NOTE     Nutrition Prescription    RECOMMENDATIONS FOR MDs/PROVIDERS TO ORDER:  None at present.    Malnutrition Status:    Severe malnutrition in the context of acute illness.    Recommendations already ordered by Registered Dietitian (RD):  Resume Osmolite 1.5 via NDT at 20 mL/hr - hold here, do not advance.    Continue TPN as ordered.  - Goal PN provides 1200 mL (max concentrate), 240 g dextrose, 140 g AA, and 250ml 20% clinolipid 7 days/week for total provision of  1876 Kcals (25 Kcals/kg), 2 g/kg protein, GIR 2.3 mg/kg/minute, and 28% fat kcals on average daily.     Future/Additional Recommendations:  Monitor BMs.  Monitor GI, TF tolerance, ability to advance TF to goal, lytes, need for renal formula (recs in 10/17 RD note if needed).  Monitor ability to wean TPN.     EVALUATION OF THE PROGRESS TOWARD GOALS   Diet: NPO (9/25 - )    Nutrition Support:   Enteral:  - Previously on TF 9/29-10/8. FT dislodged 10/9, NDT placed with Cortrack on 10/17.  - TF initiated 10/17. Held in AM on 10/18 d/t emesis. AXR re-confirmed placement of NDT on 10/18.  - Osmolite 1.5 Juvencio (or equivalent) @ goal of  50ml/hr  (1200ml/day) + 1 pkt ProSource TF20 TID (3 pkts total) provides: 2040 kcals (28 kcal/kg), 135 g PRO (1.9 g pro/kg), 914 ml free H20, 244 g CHO, and 0 g fiber daily.   - FWF: 30 mL q 4 hrs  Intake: Pt received 80 mL of TF on 10/17, which provided 120 kcals and 5 g protein.    Parenteral:  - TPN initiated 10/12 at 180 g dex. Reached goal of 240 g dex on 10/14.  - Goal PN provides 1200 mL (max concentrate), 240 g dextrose, 140 g AA, and 250ml 20% clinolipid 7 days/week for total provision of  1876 Kcals (25 Kcals/kg), 2 g/kg protein, GIR 2.3 mg/kg/minute, and 28% fat kcals on average daily.   Intake: Pt received an avg of 1832 kcals and 140 g protein a day. This met 100% of pt's kcal and protein needs.       NEW FINDINGS   Visited with pt and his wife. Unable to speak with pt  because he's intubated. Discussed pt's nutrition status with his wife and addressed any nutrition questions.    Weight:  - Last wt (10/17): 79.8 kg  - Wt fluctuating likely d/t fluid, but stable compared to admit wt. Difficult to assess true wt loss d/t fluid.  - No new wt today, but some wt loss expected after AKA on 10/17.  - No wt loss reported PTA    Labs:  Na 128 (L).   (H). Cr 5.66 (H). GFR 10 (L).  Mg++ 2.5 (H). Phos 5 (H). K+ 4.1 (wnl).  AST 49 (H).  Glucose 171 (H). 9/25 A1C 5.7 (H).    Medications:  Novolog - high insulin resistance  Levofloxacin  Liquid MVI w/ minerals  Protonix  Miralax, senna  Precedex gtt  PRN zofran, compazine    GI:  NG to LIS, placed 10/18 after emesis  Previously had NG for decompression from 10/9-10/17, output ranging from 300-2750 mL per I/O  LBM 10/14 per I/O    Skin:  S/p L AKA on 10/17  Abd wound vac in place  Right heel PI, hospital acquired. WOC signed off 10/16  Penile wound, unknown etiology. WOC signed off 10/6    Renal: iHD PRN (generally on MWF)    Respiratory: Intubated    MALNUTRITION  % Intake: No decreased intake noted with TPN  % Weight Loss: None noted  Subcutaneous Fat Loss: Facial region:  moderate, Upper arm:  moderate, Lower arm:  mild, and Thoracic/intercostal:  mild  Muscle Loss: Temporal:  moderate, Facial & jaw region:  mild, Upper arm (bicep, tricep):  moderate to severe, Lower arm  (forearm):  moderate, Dorsal hand:  mild, and Upper leg (quadricep, hamstring):  mild  Fluid Accumulation/Edema: Trace-mild  Malnutrition Diagnosis: Severe malnutrition in the context of acute illness.    Previous Goals   Resume EN vs start PN by 10/12  Evaluation: Met    Previous Nutrition Diagnosis  Inadequate protein-energy intake related to EN held since 10/8 d/t emesis as evidenced by NPO with NGT to LIS, currently meeting 0% kcal / protein needs    Evaluation: No longer applicable, nutrition diagnosis changed below    CURRENT NUTRITION DIAGNOSIS  Inadequate oral  intake related to NPO as evidenced by need for TPN and TF to meet 100% of nutrition needs.      INTERVENTIONS  Implementation  Collaboration with other providers - rounds  Enteral Nutrition - Resume at 20 mL/hr, hold here  Parenteral Nutrition/IV Fluids - Continue TPN at goal    Goals  Total avg nutritional intake to meet a minimum of 25 kcal/kg and 1.5 g PRO/kg daily (per dosing wt 72 kg).    Monitoring/Evaluation  Progress toward goals will be monitored and evaluated per protocol.    Ruiz Alexis, RD, LD  4E SICU RD pager: [phone]  Ascom: [id number]  Weekend/Holiday RD pager: [phone]

## 2023-10-18 NOTE — IR NOTE
Patient Name: Alfredo Burnham  Medical Record Number: [id number]  Today's Date: 10/18/2023    Procedure: Non-tunneled central venous catheter removal and placement of tunneled central venous catheter.    Proceduralist: Dr. Mahoney and Dr. Heshmatzaday Behzadi.    Pathology present: NA    Procedure Start: 1711  Procedure end: 1750  Sedation medications administered: Running Precedex gtt; see MAR.     Report given to:  RN  : GISEL    Other Notes: Pt arrived to IR room 4 from . Consent reviewed. Pt denies any questions or concerns regarding procedure. Pt positioned supine and monitored per protocol. Pt tolerated procedure without any noted complications. Tunneled line heparinized and ready for use; 2.5 ml per lumen.  Pt transferred back to .

## 2023-10-18 NOTE — PROGRESS NOTES
ICU Staff:    I examined the patient today. I also discussed the patient's ICU history and care with the ICU team members today, Oct. 17th, 2023. I reviewed the patient's trauma history on Oct. 17th, 2023., progress notes, and imaging studies. I discussed the patient's events overnight and the current ICU care issues with the members of the ICU Service today, Oct. 17th, 2023. I discussed the patient's physical exam findings, laboratory values, and results of any additional imaging studies with the members of the ICU Service today. The patient is a 68-year-old man admitted to the SICU after the repair of a contained ruptured AAA that required ICU care. Returned to the OR today following amputation by the Vascular Surgery service today.   I discussed the patient's surgical procedure with the members of the Vascular Surgery service in the ICU today.       PAST MEDICAL HISTORY:  Past Medical History:   Diagnosis Date    Hypertension 2/8/2011    Macular degeneration         PAST SURGICAL HISTORY:  Past Surgical History:   Procedure Laterality Date    INCISION AND DRAINAGE ABDOMEN WASHOUT, COMBINED N/A 9/25/2023    Procedure: abdominal washout, combined, repacking,  abthera placement;  Surgeon: Ash Boucher MBBS;  Location: [facility] OR    INCISION AND DRAINAGE ABDOMEN WASHOUT, COMBINED N/A 9/26/2023    Procedure: Abdominal washout, Retroperitoneal closure, washout left lower extremity wound;  Surgeon: Boris Lowe;  Location: [facility] OR    IR OR ANGIOGRAM  9/25/2023    IRRIGATION AND DEBRIDEMENT ABDOMEN WASHOUT, COMBINED N/A 9/29/2023    Procedure: exploration of  ABDOMINAL CAVITY, placement of abthera;  Surgeon: Boris Lowe;  Location: [facility]    IRRIGATION AND DEBRIDEMENT ABDOMEN WASHOUT, COMBINED N/A 10/2/2023    Procedure: Exploratory laparotomy, abominal closure, wound vac change left lower extremity;  Surgeon: Jonathan Fry MD;  Location: [facility] OR    IRRIGATION AND DEBRIDEMENT LOWER EXTREMITY, COMBINED Left  9/29/2023    Procedure: exploration of left lower extremity, partial closure of left lower extremity, wound vac placement;  Surgeon: Boris Lowe;  Location: [facility]    LAPAROTOMY EXPLORATORY N/A 9/25/2023    Procedure: Laparotomy exploratory;  Surgeon: Roger Shirley MD;  Location: [facility]    REPAIR ANEURYSM ABDOMINAL AORTA N/A 9/24/2023    Procedure: Resection of Ruptureed Abdominal Aneurysm, Aortic biliary bypass with 20 x 10 mm Bifurcated hemagard graft, Temporary Abdominal Closure, Endovascular Balloon Inclusion of Aorta;  Surgeon: Boris Lowe;  Location: [facility]    SIGMOIDOSCOPY FLEXIBLE N/A 9/25/2023    Procedure: Sigmoidoscopy flexible;  Surgeon: Gabino Walker MD;  Location: [facility]    THROMBECTOMY LOWER EXTREMITY Left 9/25/2023    Procedure: SFA, popliteal, and tibial thromboembolectomy and four compartment fasciotomy;  Surgeon: Ash Boucher MBBS;  Location: [facility]        MEDICATIONS:  Current Facility-Administered Medications   Medication    acetaminophen (TYLENOL) tablet 650 mg    acetaminophen (TYLENOL) tablet 975 mg    atorvastatin (LIPITOR) tablet 10 mg    benzocaine 20% (HURRICAINE/TOPEX) 20 % spray 0.5-1 mL    bisacodyl (DULCOLAX) suppository 10 mg    cetirizine (zyrTEC) tablet 10 mg    chlorhexidine (PERIDEX) 0.12 % solution 15 mL    dexmedeTOMIDine (PRECEDEX) 4 mcg/mL in NS infusion    dextrose 10% infusion    dextrose 10% infusion    glucose gel 15-30 g    Or    dextrose 50 % injection 25-50 mL    Or    glucagon injection 1 mg    diphenhydrAMINE-zinc acetate (BENADRYL) 1-0.1 % cream    gabapentin (NEURONTIN) capsule 300 mg    heparin infusion 25,000 units in D5W 250 mL ANTICOAGULANT    hydrALAZINE (APRESOLINE) injection 10-20 mg    hydrocortisone 2.5 % cream    HYDROmorphone (PF) (DILAUDID) injection 0.3 mg    hydrOXYzine (ATARAX) tablet 25 mg    insulin aspart (NovoLOG) injection (RAPID ACTING)    labetalol (NORMODYNE/TRANDATE) injection 10-20 mg    [START ON 10/18/2023]  levofloxacin (LEVAQUIN) infusion 500 mg    lidocaine (XYLOCAINE) 2 % external gel    lidocaine (XYLOCAINE) 2 % external gel    lipids plant base (CLINOLIPID) 20 % infusion 250 mL    melatonin tablet 5 mg    methocarbamol (ROBAXIN) tablet 500 mg    midodrine (PROAMATINE) tablet 20 mg    [START ON 10/19/2023] multivitamins w/minerals liquid 15 mL    naloxone (NARCAN) injection 0.2 mg    Or    naloxone (NARCAN) injection 0.4 mg    Or    naloxone (NARCAN) injection 0.2 mg    Or    naloxone (NARCAN) injection 0.4 mg    nystatin (MYCOSTATIN) suspension 500,000 Units    ondansetron (ZOFRAN ODT) ODT tab 4 mg    Or    ondansetron (ZOFRAN) injection 4 mg    oxyCODONE (ROXICODONE) tablet 5-10 mg    pantoprazole (PROTONIX) 2 mg/mL suspension 40 mg    Or    pantoprazole (PROTONIX) IV push injection 40 mg    parenteral nutrition - ADULT compounded formula    parenteral nutrition - ADULT compounded formula    polyethylene glycol (MIRALAX) Packet 17 g    prochlorperazine (COMPAZINE) injection 5 mg    Or    prochlorperazine (COMPAZINE) tablet 5 mg    [START ON 10/18/2023] protein modular (PROSOURCE TF20) packet 1 packet    senna-docusate (SENOKOT-S/PERICOLACE) 8.6-50 MG per tablet 1 tablet    sodium chloride 0.9% BOLUS 1,000 mL    triamcinolone (KENALOG) 0.1 % ointment        ALLERGIES:  Allergies   Allergen Reactions    Penicillins Swelling     Facial swelling    Has tolerated cefazolin, cefepime        SOCIAL HISTORY:  Social History     Socioeconomic History    Marital status:    Tobacco Use    Smoking status: Every Day     Packs/day: .5     Types: Cigarettes   Substance and Sexual Activity    Alcohol use: Yes     Comment: 1-2/wk    Drug use: No    Sexual activity: Yes     Partners: Female   Other Topics Concern    Parent/sibling w/ CABG, MI or angioplasty before 65F 55M? Yes       FAMILY HISTORY:  Family History   Problem Relation Age of Onset    Unknown/Adopted Mother     Heart Disease Mother     Arthritis Mother      "Hypertension Brother     Blood Disease Sister     Psychotic Disorder Sister      Vital Signs: /50   Pulse 94   Temp 99.3  F (37.4  C) (Axillary)   Resp 25   Ht 1.727 m (5' 8\")   Wt 79.8 kg (175 lb 14.8 oz)   SpO2 100%   BMI 26.75 kg/m      A/P: I discussed the assessment and the ICU care plan for this patient with the members of the ICU service today Oct. 17th, 2023.. I agree with the clinical assessment and the ICU care plan that I formulated with the members of the ICU Service today. I personally examined and evaluated the patient today in the [facility] ICU. The patient remains critically ill today. All of the ICU patient care orders and treatments were placed at my direction. I personally managed the patient's ICU supportive measures that were required as the patient's critical illness created a continuous threat of loss of life for the patient. Key critical care decisions were made by me today to maintain life-saving, effective ICU-supportive care. I spent 35 minutes providing critical care services at the bedside and in the critical care unit, evaluating the patient, directing care, and reviewing the patient's laboratory values and the patient's radiologic imaging reports associated with the patient's critical illness. I have evaluated all laboratory values and imaging studies from the past 24 hours. I actively assessed this acute critically ill patient, and I personally provided supportive interventions that were required because the patient has acute and persistent, imminently life-threatening organ system failure. The critical care provided required high complexity decision making and clinical evaluation as the patient has an acute critical illness that impairs one or more vital organs. The patient remains critically ill due to acute hypoxic respiratory failure and acute kidney injury. The patient was assessed using the bedside ultrasound today to evaluate the patient's fluid status. The " patient has a high probability of imminent or life-threatening deterioration. I spent 35 minutes of Critical Care Time, which was required to assess the patient's fluid status using the bedside ultrasound today.  The patient may require re-enforcement of the surgical dressing tonight.   I coordinated the patient's ICU care and directed the re-assessment of the patient's volume status in the ICU. The critical care time was exclusive of any time required to perform any bedside procedures. The Critical Care Time was required to personally provide and direct critical care services at the bedside and in the critical care unit for this acutely critically ill patient. I personally managed the patient's sedation, pain control and analgesia, metabolic abnormalities, nutritional status, and vasoactive medications.    Alfredo Mccall Jr, MD, Ph.D., FACS   Surgery  Department of Surgery  [facility]

## 2023-10-18 NOTE — PLAN OF CARE
Major Shift Events:  Alert/lethargic, unable to determine orientation. Follows simple commands. Moves upper extremities without difficulty. Withdraws in RLE. LLE below the knee amputation. Mitts and soft restraints on d/t line pulling. Pupils unequal and reactive. Sinus rhythm/sinus tach with multiple PVCs. MAP goal greater than 65, meets without intervention. CMV settings, lung sounds coarse/diminished. No void, no BM, bowel sounds active. ND in place, tube feeds increased to 20 mL at 0000, d/t large green/bile emesis around 0400 tube feeds turned off. Abd firm/non distended. Abd wound vac in place on surgical incision with no output. Dex @ 0.6, TPN @ 50, and Lipids @ 20.   Plan: Pressure support during the day, IR for tunneled CVC placement, continue with plan of care and notify team of any changes.  For vital signs and complete assessments, please see documentation flowsheets.

## 2023-10-18 NOTE — PROCEDURES
[facility]    Procedure: IR Procedure Note    Date/Time: 10/18/2023 6:15 PM    Performed by: Behzadi, Heshmatzadeh, MD  Authorized by: oRwena Mahoney MD  IR Fellow Physician: Ashkan Behzadi      UNIVERSAL PROTOCOL   Site Marked: NA  Prior Images Obtained and Reviewed:  Yes  Required items: Required blood products, implants, devices and special equipment available    Patient identity confirmed:  Verbally with patient, arm band, provided demographic data and hospital-assigned identification number  Patient was reevaluated immediately before administering moderate or deep sedation or anesthesia  Confirmation Checklist:  Patient's identity using two indicators, relevant allergies, procedure was appropriate and matched the consent or emergent situation and correct equipment/implants were available  Time out: Immediately prior to the procedure a time out was called    Universal Protocol: the Joint Commission Universal Protocol was followed    Preparation: Patient was prepped and draped in usual sterile fashion       ANESTHESIA    Anesthesia:  Local infiltration  Local Anesthetic:  Lidocaine 1% without epinephrine      SEDATION    Patient Sedated: No    See dictated procedure note for full details.  Findings: -    Specimens: none    Complications: None    Condition: Stable    Plan: Tunneled Dialysis catheter is ready to be accessed and used.       PROCEDURE  Describe Procedure: Successful removal of non-tunneled  Right internal jugular dialysis catheter.     Successful placement of Tunneled right internal jugular dialysis catheter.       Patient Tolerance:  Patient tolerated the procedure well with no immediate complications  Length of time physician/provider present for 1:1 monitoring during sedation: 30

## 2023-10-18 NOTE — PROGRESS NOTES
Major Shift Events: HD done this AM. Patient went to IR this evening where tunneled dialysis catheter was placed and non-tunneled dialysis line was removed. Anesthesia completed a femoral block or pain management. Patient had episode of diarrhea and unfortunately it saturated the left AKA dressing. Rectal tube was placed. Vascular was notified and dressing was changed by team. New non-blanchable red area noticed on left cheek when ETT stabilizer was replaced by RT. Mepilex lite was placed under stablizer prior to being replaced. RN notified MD and asked them to place a WOC consult.     Plan: Remain in ICU.   For vital signs and complete assessments, please see documentation flowsheets.

## 2023-10-18 NOTE — PROGRESS NOTES
"[facility] Dermatology Progress Note    Date of Admission: Sep 24, 2023   Encounter Date: 10/18/2023    Assessment and Plan:    1. Morbilliform (Exanthematous) Drug Eruption, possibly 2/2 cefepime    On exam today, patient's rash appears to be improving with fading pink erythema & lab trends reassuring against DRESS. Clinical impression of a diffuse erythematous eruption (onset ~10/13) is rather nonspecific but most frequently represents a viral exanthem or a morbilliform drug eruption. Morbilliform or exanthematous drug eruptions usually manifest between 4 and 14 days after initiating a medication. The time to eruption may be shorter if the patient had previously been sensitized to the triggering medication (patient potentially with reaction to penicillins in the past). The most common culprits include antibiotics (penicillins and sulfas), allopurinol, anti-epileptics (phenytoin, barbiturates, carbamazepine), and NSAIDs, but many other drug culprits have been reported. A skin biopsy is not routinely necessary for the diagnosis of exanthematous drug eruption because the histopathologic findings (vacuolar interface dermatitis and tissue eosinophilia) are nonspecific. In this patient, he has been on a few recent antibiotics including vancomycin (10/1-2), metronidazole (9/25-10/3), and cefepime (9/25-10/3, 10/14-15). Cefepime would be the most suspicious culprit at this time.     For exanthematous drug eruptions, prompt withdrawal of the offending drug is the mainstay of treatment. It may take an additional 1-2 weeks after stopping the medication before the eruption completely resolves. However, when a culprit drug is considered essential for the patient and there are no suitable alternatives, mild or moderate exanthematous eruptions can be \"treated through\" with topical steroids and antihistamines to alleviate itch while the suspected medication is continued. Drug-induced hypersensitivity " "syndrome (DIHS; previously DRESS or Drug Reaction with Eosinophilia and Systemic Symptoms) is less favored at this time given his mild and improving eosinophil counts and AST. If the patient's presentation becomes more concerning for DIHS/DRESS, then a discussion regarding risks/benefits of systemic steroids would be necessary given patient's comorbidities and recent/pending surgeries. However, we suggest not routinely using systemic steroids for the treatment of uncomplicated exanthematous drug eruptions.      Recommendations:  - agree with switching cefepime to levofloxacin  - continue daily CBC with diff and CMP to verify to not progressing to DIHS/DRESS (looking for new/worsening eosinophilia, transaminitis)  - continue triamcinolone 0.1% ointment BID rash on body (can change to cream per patient preference though ointment will be more potent)  - consider hydrocortisone 2.5 % cream BID rash on face    We will continue to follow. Please do not hesitate to contact the dermatology resident/faculty on call for any additional questions or concerns.     Staffed with attending physician, Dr. Hakeem Austin MD   Dermatology Resident (PGY-4)    I have seen and examined this patient and agree with the assessment and plan as documented in the resident's note.    Jone Palacio MD  Dermatology Attending      Interval history:  - Cefepime was discontinued and patient started on levofloxacin 10/16  - Patient had LLE amputation yesterday   - Eosinophils and LFTs reassuring  - EBV, CMV, HHV6 normal    Medications:  Reviewed in epic, pertinent findings summarized as above    Physical exam:  BP (!) 88/52   Pulse 105   Temp 100.2  F (37.9  C) (Axillary)   Resp 30   Ht 1.727 m (5' 8\")   Wt 79.8 kg (175 lb 14.8 oz)   SpO2 99%   BMI 26.75 kg/m    GEN: Intubated and sedated.   SKIN: Focused examination of the head, neck, arms, legs, upper chest, and lower abdomen was performed.  - Driscoll skin type: I  - There are " pink macules coalescing into confluent patches involving the cheeks, neck, trunk, arms, and thighs. (Overall rash improving and more subtle and less bright red than prior)  - No other lesions of concern on areas examined.     Laboratory:  Reviewed in epic, pertinent findings summarized as above    Staff Involved:  Resident/Staff

## 2023-10-18 NOTE — PROGRESS NOTES
Vascular Surgery Progress Note  10/18/2023       Subjective:  Continues to have diffuse truncal rash, 1 episode of vomiting overnight, TF held.  Underwent L AKA yesterday.  Lactate 1.5, with hyponatremia sodium at 128, stable leukocytosis, WBC 14.3, Hgb 6.8. Restless this morning. Pending tunneled catheter with IR.    Objective:  Temp:  [98.5  F (36.9  C)-100.2  F (37.9  C)] 100.2  F (37.9  C)  Pulse:  [] 105  Resp:  [17-34] 30  BP: ()/(50-78) 88/52  MAP:  [52 mmHg-99 mmHg] 62 mmHg  Arterial Line BP: ()/(42-71) 90/48  FiO2 (%):  [30 %-40 %] 30 %  SpO2:  [95 %-100 %] 99 %    I/O last 3 completed shifts:  In: 2660.9 [I.V.:900.1; NG/GT:250]  Out: 25 [Drains:25]      Gen: resting comfortably in bed in ICU, lightly sedated, intubated.  CV: RR per DP pulse, off pressors, regular HR per tele   Resp: intubated, on minimal vent settings  Abd: Wound vac in place, holding seal, abd binder on  Ext: RLE wwp, palpable DP pulse, LLE stump with ACE WRAP, no strikethrough blood    Labs:  Recent Labs   Lab 10/18/23  0344 10/17/23  1155 10/17/23  0335   WBC 14.3* 14.6* 13.2*   HGB 6.8* 7.3* 7.5*   * 130* 141*       Recent Labs   Lab 10/18/23  0409 10/18/23  0344 10/18/23  0018 10/17/23  0340 10/17/23  0335 10/16/23  0811 10/16/23  0431   NA  --  128*  --   --  132*  --  131*   POTASSIUM  --  4.1  --   --  3.4  --  4.2   CHLORIDE  --  90*  --   --  94*  --  91*   CO2  --  15*  --   --  20*  --  15*   BUN  --  110.0*  --   --  74.9*  --  142.0*   CR  --  5.66*  --   --  4.26*  --  7.20*   * 171* 166*   < > 152*   < > 127*  135*   OMAR  --  8.0*  --   --  8.2*  --  8.5*   MAG  --  2.5*  --   --  1.9  --  2.7*   PHOS  --  5.0*  --   --  4.3  --  7.7*    < > = values in this interval not displayed.      Imaging  Narrative & Impression   EXAMINATION: CT CHEST/ABDOMEN/PELVIS W CONTRAST, 10/13/2023 11:33 PM     COMPARISON STUDY: Abdominal radiograph 10/10/2023     INDICATION: new desat episode, hypotension,  eval ? infectious source     TECHNIQUE: CT scan of the chest, abdomen and pelvis was performed on  multidetector CT scanner using volumetric acquisition technique and  images were reconstructed in multiple planes with variable thickness  and reviewed on dedicated workstations.      CONTRAST: iopamidol (ISOVUE-370) solution 104 m. Without oral  contrast.     CT scan radiation dose is optimized to minimum requisite dose using  automated dose modulation techniques.     Findings:      Chest:  Lungs: Bilateral small-to-moderate pleural effusions with associated  atelectasis and ground glass opacities, left greater than right.  Basilar predominant interlobular septal thickening. No pneumothorax.     Mediastinum: Heart size is within normal limits. No pericardial  effusion. Aorta and main pulmonary artery caliber are within normal  limits No mediastinal lymphadenopathy. Small volume of air within the  left brachiocephalic vein (series 5, image 33) just prior to entering  the superior vena cava, likely iatrogenic secondary left upper  extremity central venous catheter. Enteric tube coursing the esophagus  terminating in the stomach lumen, otherwise the esophagus is  unremarkable.         Abdomen/Pelvis:    Liver: No mass. Mild intrahepatic biliary dilatation. Hypodensity at  the lateral aspect of the right hepatic lobe measuring up to 11 mm  (series 5, image 103).      Gallbladder/CBD: Mildly distended gallbladder with layering sludge.  Common bile duct within normal limits for patient's age.     Pancreas: Pancreas appears to be normal enhancing with peripancreatic  hypoattenuating collection this primarily hypoattenuating but with  areas of increased hyperattenuation. This suggests peripancreatic  fluid collection with hemorrhage.     Spleen: Surrounded by hypoattenuating fluid, otherwise unremarkable.     Adrenal glands: Normal.     Kidneys/ureters/bladder: Heterogeneous hypoattenuation of the kidneys  bilaterally with  wedgelike confluent hypoattenuation in the interpolar  and superior pole of the left kidney (series series 5, images  127-156). No obstructing renal stone, hydronephrosis, renal masses.      Genitourinary/reproductive tract: Normal bladder. Reproductive organs  are within normal limits.     Gastrointestinal: Hypoattenuating of the large bowel with wall  thickening starting from the rectum and extending to the mid  transverse colon with surrounding fat stranding (160-257). Colonic  diverticulosis. Normal caliber small bowel. Appendix not seen. Stomach  and esophagus are unremarkable.     Vasculature: Postsurgical changes of ruptured AAA open repair with  mixed attenuation clot surrounding the postsurgical repairs. Narrow  lumen inferior vena cava compressed by hematoma at the level of the  kidneys. Small hematoma at the aortic bifurcation. Patent hepatic  portal vein.     Retroperitoneum/peritoneum/mesentery: Ascites throughout the  peritoneal cavity surrounding the liver or spleen, infrarenal aorta  and layering in the pelvis. 2 cm hematoma at the aortic bifurcation. 2  cm hematoma just below the level of the renal veins.     Bones: No acute or aggressive appearing osseous bodies. Ultimately  degenerative changes of the spine.     Soft tissues: Enlarged left iliacus muscle with hypodense  heterogeneous appearance measuring up to 6.6 cm (series 5, image 224).  Diffuse anasarca.                                                                      IMPRESSION:   1.  Enlarged heterogeneously hypoattenuating left illiacus muscle  measuring up to 6.6 cm. Findings may represent developing iliacus  hematoma. Consider CTA abdomen/pelvis to further evaluate for active  bleeding within the hematoma.  2.  Narrow caliber infrahepatic IVC with appearance of compression in  between thrombus secondary to AAA rupture and abdominal aorta;  findings may represent both hypotension and potential compression  secondary to the surrounding  hematoma.   3.  Bilateral, left greater than right, wedge-shaped hypodensities  suggesting infarcts.   4.  Edematous left colon. There appears to be mucosal enhancement. No  evidence of pneumatosis intestinalis or free air in the abdomen.  5.  Pancreas appears perfused but there is peripancreatic fluid with  hemorrhage. No pseudoaneurysm or extravasation identified.   6.  Diffuse intraperitoneal ascites and mixed attenuating hematoma,  along the right inferior renal aorta and aortic bifurcation, likely  related to recent AAA rupture and subsequent repair.  7.  Bilateral pleural effusions with associated atelectasis and  groundglass opacities, likely representing pulmonary edema.  8.  Diffuse anasarca.              [Urgent Result: Suspected left iliac is hematoma measuring up 6.6 cm;  suspected devascularized interpolar and superior left kidney.]     Finding was identified on 10/14/2023 12:09 PM.        Assessment/Plan:   68 year old male with PMH of HTN and previous TIA who presented with R sided abdominal pain found to have contained ruptured AAA, now s/p open AAA repair 9/24 into 9/25am with 30 min supraceliac clamp time, prolonged inter-renal clamp time, temporary abdominal closure. He did have bloody stool postop with flex sig 9/25 demonstrating ischemic mucosal changes of the rectum, repeated abdominal without evidence of transmural necrosis of the small or large intestine, or the rectum. On 9/29 he was started on CRRT given ongoing low UOP and rising Cr and failure of lasix challenge. Now on iHD. Hemodynamically continues to do well off pressors. S/p closure of abdominal fascia and subcutaneous tissue left open with wound VAC applied 10/2/23. With ischemic LLE, AKA deferred until 10/17/2023 due to leukocytosis, hypoxia requiring reintubation (10/13/23) and critical illness. ADDIS on 10/16/23 without evidence of embolic source.  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    Neuro:   - Sedation per SICU  - Appreciate  assessment neuro status  - Stroke workup negative -- MRI with multiple small infarcts, follow-up with neurology 4-6 weeks after discharge, ok with a/c.   - ADDIS for workup 10/16/23 demonstrated no thromboembolic source or shunt identified in cardiac chambers. Grade IV atheroma in descending aorta and aortic arch   CV:   - Off pressors, midodrine 20mg daily prn with HD runs   - Goal SBP <160, MAP >65  - Labetalol prn for SBP >160  - Continue statin  - PE+, venous duplex with nonocclusive to occlusive thrombus of the left GSV from the level of the knee to the groin and nonocclusive thrombus of the left distal femoral vein and popliteal veins, increased in extent compared to 9/30/2023.   - Therapeutic Heparin gtt   - ADDIS 10/16/23 demonstrated no thromboembolic source or shunt, has grade IV atheroma in descending aorta and aortic arch   Resp:   - No new acute concerns, minimal vent settings  - Vent settings per SICU, wean as tolerated.   - Consider SBT and potential extubation  GI:   - Pancreatitis with peripancreatic fluid collection on CT with question of bleeding into the collection.  - Do not expect this anterior location of pancreatic fluid to be related to surgical procedure as we were in the RP and did see inferior/posterior aspect of pancreas, but would expect if it were to be surgery related it would be more posterior.   - GI signed off as collection not drainable  - Fluid collection is not surrounding aortic graft, this is reassuring.  - NPO, NGT for LIS (started on TF overnight 10/17-10/18 and vomited, now off TF)  - Continue TPN   - Monitor NGT output  FEN:   - Electrolyte replacement prn   Renal:   - Appreciate nephrology recommendations  - Continue iHD  - Pending tunneled line with IR  Heme:   - Hgb 6.8 this morning, pending transfusion.  - DVT as above, PE   - Therapeutic heparin gtt   - PT/OT ROM  ID:   - known aspiration event 10/9   - monitor signs of pneumonia  - nystatin swish for oral candida  -  improving leukocytosis, downtrending, monitor for now.   MSK:   - L AKA 10/17/23   Derm:   - Has rash on abdomen, trunk, anterior bilateral thighs, erythematous, blanching, however maculopapular does not seem consistent with cellulitis  - Pharmacy reviewed medications for possible causes of rash and felt all were relatively low risk however not 0 risk   - benadryl cream topically applied with no improvement  - trialed abd binder off for short time to see if improvement (?moisture rash), none seen.   - Appreciate dermatology recs, likely morbilliform eruption vs. Low concern for possible DRESS   - CBC with diff daily    - LFTs daily    - triamcinolone 0.1% twice daily to areas w/ rash    - switched cefepime to levofloxacin  Misc:   - VAC changes with vascular surgery M/W/F, this week will change VAC Tuesday in OR as last changed 10/17.   - abd binder on at all times.  - Heparin gtt  - SICU status    Discussed and seen patient with Dr. Donald who will discuss with Dr. Lowe, vascular surgery staff    Miryam Carrasco CNP  Vascular Surgery  Pager: [phone]  [email or web address]  Send message or 10 digit call back number Securely via [employer] with the [employer] Web Console (learn more here)

## 2023-10-18 NOTE — PLAN OF CARE
Problem: Oral Intake Inadequate  Goal: Improved Oral Intake  Outcome: Not Progressing   Goal Outcome Evaluation:       Pt NPO, meeting nutrition needs with TPN. Resuming TF today at 20 mL/hr, will hold here to monitor tolerance. See RD note for full assessment and recommendations.

## 2023-10-18 NOTE — PROGRESS NOTES
Date: 10/18/2023  Time: 12:55 PM     Data:  Pre Wt:   79.8 kg  Desired Wt:   To be established  Post Wt:  77.8 kg (estimated)  Weight change: - 2.0 kg  Ultrafiltration - Post Run Net Total Removed (mL):  2000 ml  Vascular Access Status: CVC patent  Dialyzer Rinse:  Light; Streaked  Total Blood Volume Processed: 65.7 L   Total Dialysis (Treatment) Time:   3.5 Hrs  Dialysate Bath: K 3, Ca 3  Heparin: Heparin: None     Lab:   No  HbsAg - 10/6/2023 (Non reactive)  HbsAb - 10/6/2023  (Immune)     Interventions:  Dialysis done through right internal jugular CVC  UF set to 3.3 Liters of fluid removal, accommodating priming and rinse back volumes  ,   Medication administered: See MAR  CVC dressing changed aseptically  BP was soft on HD run, PCN aware, PO Midodrine given.  Arterial line collapsing, BFR decreased to 300-350ml/min.  NP Bebeto seen pt., he ordered to lower net UF to 2.0L to prevent further BP drop.  Catheter lumens locked with Saline, catheter caps (ClearGuard ) changed post HD  Report given to ARIE Lafleur.     Assessment:  Sedated, trached on [facility] vent, calm & cooperative, denies pain.  Lung sounds anterior and lateral BUL/ BLL: coarse ; diminished  CVC intact, previous dressing clean and dry                Plan:    Per Renal team      JUSTIN AbdullahiN, RN  Acute Dialysis RN  [facility]

## 2023-10-18 NOTE — ANESTHESIA PROCEDURE NOTES
"Femoral Procedure Note    Pre-Procedure   Staff -        Anesthesiologist:  Roxana Elilott MD       Resident/Fellow: Karan Lowe MD       Performed By: resident and with residents       Procedure performed by resident/fellow/CRNA in presence of a teaching physician.         Pre-Anesthestic Checklist: patient identified, IV checked, risks and benefits discussed, informed consent and monitors and equipment checked  Timeout:       Correct Patient: Yes        Correct Site: Yes        Correct Laterality: Yes   Procedure Documentation  Procedure: Femoral       Diagnosis: POST OP PAIN       Patient Position: supine       Skin prep: Chloraprep       Needle Type: short bevel       Needle Gauge: 21.        Needle Length (millimeters): 110        1. Ultrasound was used to identify targeted nerve, plexus, vascular marker, or fascial plane and place a needle adjacent to it in real-time.       2. Ultrasound was used to visualize the spread of anesthetic in close proximity to the above referenced structure.       3. A permanent image is entered into the patient's record.    Assessment/Narrative         The placement was negative for: blood aspirated and painful injection    Medication(s) Administered   Bupivacaine 0.25% PF (Infiltration) - Infiltration   20 mL - 10/18/2023 2:40:00 PM    FOR [facility] ([facility]) ONLY:   Pain Team Contact information: please page the Pain Team [address] [employer]. Search \"Pain\". During daytime hours, please page the attending first. At night please page the resident first.      "

## 2023-10-18 NOTE — PROGRESS NOTES
SURGICAL ICU PROGRESS NOTE  10/18/2023        Date of Service (when I saw the patient): 10/18/2023    ASSESSMENT:  Alfredo Burnham is a 68 year old male who was admitted to the SICU on 9/24/2023 s/p open AAA repair. Patient has a history of HTN and previous TIA. He presented to the ED on 9/24 with right sided abdominal pain and was found to have a contained, ruptured AAA on CT. 30 min super-celiac clamp time. Prolonged intra-renal clamp. 9/25 s/p flex sig showing rectal ischemia, abdominal washout showing a hemostatic AAA graft and no evidence of transmural colonic or small bowel ischemia, and an unsuccessful LLE embolectomy. 9/26 s/p repeat abdominal washout, retroperitoneal closure, LLE wound washout. 9/29 s/p repeat abd washout, bowel appeared well perfused w/o notable ischemia. OR 10/2 for abdominal fascia closure. Extubated 10/4. Unequal pupils and R facial droop, MRI brain showed multiple small infarcts, likely embolic. Patient was transferred out of the surgical ICU on 10/12. On 10/13, patient completed dialysis run and suddenly started experiencing desaturation. Patient was emergently intubated for Acute Hypoxic Respiratory Failure in dialysis unit and transferred to SICU for further cares. AMOS WINSLOW 10/18 with abd wound vac exchange. Patient admitted to ICU for intubation and sedation management.     CHANGES and MAJOR THINGS TODAY:   -Hold seroquel, atarax, gabapentin to assess neuro-exam off sedating medicine  -Wean precedex to RASS goal of 0 to -1  -PST today   -IR for placement of tunneled dialysis line  -HD today with plan for 3L removal   -Place NGT and put to LIS, continue NJ tube  -1 unit(s) pRBC for low Hgb  -Monitor EBV, HHV-6 labs  -Hold heparin gtt, resume once procedures are completed  -Plan for nerve block with RAPS, will try to schedule  -Continue Levaquin for DRESS concern for 2 more doses , discontinue Friday     PLAN:    Neurological:  # Sedation  - Precedex 0.6 - wean to RASS goal of 0  to -1   # Acute pain  RAPS plan for nerve block today  - Multi-modal pain control effective (scheduled tylenol, oxycodone PRN, dilaudid PRN, robaxin PRN). Discontinue gabapentin      #Facial droop, left lacunar infarct  #Punctate lesions of cerebellum   - Follow-up with stroke neurology 4-6 weeks after discharge  - ADDIS with no evidence of embolic source     # Delirium  # Mixed metabolic encephalopathy   # Sedation, RASS goal 0 to -1  - Monitor neurological status. Delirium preventions and precautions  - Melatonin q6pm.   -Hold Seroquel and Atarax PRN      Pulmonary:  # Acute hypoxic respiratory failure  # Re-intubation 10/13 (after prev extubation 10/3)  # Pulmonary embolism   Satting well overnight, %.   - Mechanical ventilation  / FiO2 30% / RR 14 / PEEP 5  - Continue PSTs this AM     Cardiovascular:    # Contained AAA rupture s/p open repair 9/25  # Acute critical limb ischaemia of LLE  # Hx of HTN  # Tachycardia  # L iliacus hematoma  # Peripancreatic hematoma   Hgb at 6.8 this AM, 1 unit(s) pRBC today.  - Continue to monitor hemodynamic status; Goal MAP >65, SBP <160  - Discuss with vascular surgery when to restart heparin high dose, no OR today    - Continue Lipitor 10 mg     GI/Nutrition:    # s/p open AAA repair, wound vac device in place  # Post-operative melena, resolved  # Rectal ischemia, concern for, resolved  # Aspiration event 10/9  - BR with senna BID and miralax daily  - Continue TPN for nutrition  -ND tube placed, TF held overnight due to emesis. Inserted NGT and placed to LIS.  - Monitor electrolytes     Renal/Fluids/Electrolytes:  # Protein calorie deficit malnutrition, risk of   # Acute kidney injury  # Oliguria  # Haemodialysis  #Hyponatremia   - No IVF  - dialysis today per nephrology; plan to remove 3K  -Plan for CVC line with IR today for long term HD  - Strict intake and output  -Hyponatremic, likely dilutional. Will  continue to monitor.     Endocrine:  # Stress  hyperglycaemia  - High SSI for glucose control  - q6h BG checks     Infectious disease:   # oropharyngeal candidiasis  # Likely aspiration event during ADDIS  WBC stable at 14.3. Sputum culture collected, growing 1+ GPCs.  - Cefepime discontinued 10/16 is setting of possible DRESS reaction. Started levaquin 750 loading dose, 500 q48h, last dose friday   - Nystatin suspension for thrush     Hematology:    # Acute blood loss anemia  Hgb 6.8, transfuse 1 unit pRBC  - INR 1.32 (10/16)  - Heparin gtt held for IR procedure and potential RAPS nerve block      MSK:  # Weakness and deconditioning of critical illness  # Left lower limb ischemia   - Passive ROM at bedside with RN  Marisol WINSLOW completed Tuesday 10/17  - Physical therapy when possible     Skin:  # Diffuse blanching maculopapular rash  Rash present since ~10/13 on abdomen, trunk, thighs. No medications per pharm thought to be the cause. Derm evaluated and felt it was a morbilliform eruption vs less likely DRESS syndrome.   - CBC with differential daily  - LFTs daily  -Await HHV-6 and EBV labs   - Continue triamcinolone 0.1% ointment, hydrocortisone cream twice daily to all body areas with rash  - Monitor closely for progression or change in skin findings.  - Consider peripheral smear to assess for reactive lymphocytes    General Cares/Prophylaxis:    DVT Prophylaxis: Pneumatic Compression Devices and heparin gtt (held)  GI Prophylaxis: PPI  Restraints: Restraints for medical healing needed: YES     Lines/ tubes/ drains:  - ETT  - Right internal jugular double lumen  - Left triple lumen suvbclavian central line   - NJT  - PIVs in RUE  - Abdominal wound vac    Disposition:  - Surgical ICU     Patient seen, findings and plan discussed with surgical ICU staff, Dr. Mccall.    Oralia Stafford, MS4  Surgical ICU  ====================================  INTERVAL HISTORY:   Episodes of emesis at 0400. Some PVCs and PACs.     ROS unable to be performed due to sedation.        OBJECTIVE:   1. VITAL SIGNS:   Temp:  [98.5  F (36.9  C)-100.2  F (37.9  C)] 100.2  F (37.9  C)  Pulse:  [] 105  Resp:  [17-34] 30  BP: ()/(50-78) 88/52  MAP:  [52 mmHg-99 mmHg] 62 mmHg  Arterial Line BP: ()/(42-71) 90/48  FiO2 (%):  [30 %-40 %] 30 %  SpO2:  [95 %-100 %] 99 %  Vent Mode: CPAP/PS  (Continuous positive airway pressure with Pressure Support)  FiO2 (%): 30 %  Resp Rate (Set): 14 breaths/min  Tidal Volume (Set, mL): 450 mL  PEEP (cm H2O): 5 cmH2O  Pressure Support (cm H2O): 5 cmH2O  Resp: 30      2. INTAKE/ OUTPUT:   I/O last 3 completed shifts:  In: 2660.9 [I.V.:900.1; NG/GT:250]  Out: 25 [Drains:25]    3. PHYSICAL EXAMINATION:  General: intubated, sedated          HEENT: normocephalic,atraumatic  Neuro: sedated on precedex, occasionally opens eyes, moving upper extremities spontaneously   Pulm/Resp: intubated  CV: normotensive, off pressors  Abdomen: Abdomen mildly distended. Midline laparotomy incision with wound vac in place without output in canister.  abdominal binder in place.  Gu: decreased scrotal swelling.  MSK/Extremities: L AKA wrapped and dressed without strikethrough    4. INVESTIGATIONS:   Arterial Blood Gases   Recent Labs   Lab 10/14/23  0111   PH 7.41  7.41   PCO2 41  41   PO2 85  85   HCO3 26  26     Complete Blood Count   Recent Labs   Lab 10/18/23  0344 10/17/23  1155 10/17/23  0335 10/16/23  1616 10/16/23  0431   WBC 14.3* 14.6* 13.2*  --  13.5*   HGB 6.8* 7.3* 7.5* 8.5* 7.8*   * 130* 141*  --  145*     Basic Metabolic Panel  Recent Labs   Lab 10/18/23  0409 10/18/23  0344 10/18/23  0018 10/17/23  2009 10/17/23  0340 10/17/23  0335 10/16/23  0811 10/16/23  0431 10/15/23  0359 10/15/23  0353   NA  --  128*  --   --   --  132*  --  131*  --  136   POTASSIUM  --  4.1  --   --   --  3.4  --  4.2  --  4.0   CHLORIDE  --  90*  --   --   --  94*  --  91*  --  96*   CO2  --  15*  --   --   --  20*  --  15*  --  21*   BUN  --  110.0*  --   --   --  74.9*   --  142.0*  --  95.8*   CR  --  5.66*  --   --   --  4.26*  --  7.20*  --  5.77*   * 171* 166* 158*   < > 152*   < > 127*  135*   < > 123*    < > = values in this interval not displayed.     Liver Function Tests  Recent Labs   Lab 10/18/23  0344 10/17/23  0335 10/16/23  0431 10/15/23  0353 10/14/23  0349 10/13/23  2130 10/13/23  2034   AST 49* 51* 59* 65*   < > 89*  --    ALT 32 32 33 34   < > 47  --    ALKPHOS 96 87 82 98   < > 115  --    BILITOTAL 0.3 0.4 0.4 0.4   < > 0.5  --    ALBUMIN 2.5* 2.5* 2.5* 2.5*   < > 3.2*  --    INR  --   --  1.32* 1.32*  --  1.20* 1.18*    < > = values in this interval not displayed.     Pancreatic Enzymes  Recent Labs   Lab 10/14/23  1017   LIPASE 51     Coagulation Profile  Recent Labs   Lab 10/16/23  0655 10/16/23  0431 10/15/23  0353 10/13/23  2130 10/13/23  2034   INR  --  1.32* 1.32* 1.20* 1.18*   PTT 50*  --  >240* 125* 121*         5. RADIOLOGY:   Recent Results (from the past 24 hour(s))   Transesophageal Echocardiogram   Result Value    LVEF  55-60%    [facility],[place]  Echocardiography Laboratory  [address] [place] [id number]     Name: NEWTON BUCKLEY  MRN: [id number]  : 1955  Study Date: 10/16/2023 02:35 PM  Age: 68 yrs  Gender: Male  Reason For Study: Thrombembolism  History: Embolic Event     Performed By: Mikhail Corrales MD  BSA: 1.9 m2  Height: 68 in  Weight: 173 lb  HR: 114  BP: 110/65 mmHg  ______________________________________________________________________________  Interpretation Summary  No thromboembolic source or shunt identified in cardiac chambers.  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  ______________________________________________________________________________  Procedure  Transesophageal Echocardiogram with color and spectral Doppler performed.  Bubble study performed. Procedure location Patient Floor. Informed consent for  Transesophegeal echo obtained.  ADDIS Probe #67 was used during the procedure.  Patient was sedated using Fentanyl 100 mcg. Patient was sedated using Versed 3  mg. The heart rate, respiratory rate, oxygen saturations, blood pressure, and  response to care were monitored throughout the procedure with the assistance  of the nurse. I determined this patient to be an appropriate candidate for the  planned sedation and procedure and have reassessed the patient immediately  prior to sedation and procedure. Total sedation time: 24 minutes of continuous  bedside 1:1 monitoring. The Transducer was inserted without difficulty . The  patient tolerated the procedure well. Complications None. The patient's rhythm  is normal sinus. Good quality two-dimensional was performed and interpreted.  Good quality color and spectral Doppler were performed and interpreted.     Left Ventricle  Global and regional left ventricular function is normal with an EF of 55-60%.  Left ventricular size is normal.     Right Ventricle  Global right ventricular function is normal. The right ventricle is normal  size.     Atria  Both atria appear normal. The left atrial appendage Doppler velocities are  normal. The left atrial appendage is normal. It is free of spontaneous echo  contrast and thrombus. There was no shunt at the atrial septal level as  assessed by color Doppler and agitated saline bubble study at rest and with  Valsalva maneuver. A catheter is noted in the right atrium.     Mitral Valve  The mitral valve is normal. Trace mitral insufficiency is present.     Aortic Valve  The aortic valve is tricuspid. Mild aortic valve sclerosis is present.     Tricuspid Valve  The tricuspid valve is normal. Trace tricuspid insufficiency is present.     Pulmonic Valve  The pulmonic valve is normal. Trace pulmonic insufficiency is present.     Vessels  The aorta root is normal. Grade IV atheroma in descending aorta and aortic  arch. The RUPV Doppler shows normal velocity waveform . The right  lower  pulmonary vein cannot be assessed. The LUPV Doppler shows normal velocity  waveform . The LLPV Doppler shows normal velocity waveform .     Pericardium  No pericardial effusion is present.     Compared to Previous Study  This study was compared with the study from 09/28/2023 . There is better  visualization of cardiac structures but no significant change noted.     ______________________________________________________________________________  Report approved by: Ruperto North 10/16/2023 11:15 PM     ______________________________________________________________________________      XR Abdomen Port 1 View    Narrative    Exam: XR ABDOMEN PORT 1 VIEW, 10/17/2023 3:07 PM    Indication: Verify small bowel feeding tube bedside placement    Comparison: 10/13/2023    Findings:   Supine AP radiograph of the abdomen demonstrates feeding tube coursing  below the left hemidiaphragm, with tip projecting over the expected  location of distal duodenum. Relative paucity of bowel gas.. Multiple  likely overlying monitoring wires with electronic device projecting  over the left upper quadrant.       Impression    Impression: Feeding tube tip projects over the expected location of  the distal duodenum.    I have personally reviewed the examination and initial interpretation  and I agree with the findings.    LILLIAN HAYNES MD         SYSTEM ID:  [id number]       I saw and evaluated the patient in an independent visit and agree with the student's assessment and plan as written above. I was present at all times for any mentioned procedures and independently assessed the patient. Corrections to the above plan have been made by me as necessary.    Clint Braga MD  PGY-1, Neurosurgery  [facility]

## 2023-10-19 ENCOUNTER — APPOINTMENT (OUTPATIENT)
Dept: GENERAL RADIOLOGY | Facility: CLINIC | Age: 68
DRG: 268 | End: 2023-10-19
Attending: STUDENT IN AN ORGANIZED HEALTH CARE EDUCATION/TRAINING PROGRAM
Payer: COMMERCIAL

## 2023-10-19 LAB
ALBUMIN SERPL BCG-MCNC: 2.4 G/DL (ref 3.5–5.2)
ALP SERPL-CCNC: 103 U/L (ref 40–129)
ALT SERPL W P-5'-P-CCNC: 30 U/L (ref 0–70)
ANION GAP SERPL CALCULATED.3IONS-SCNC: 18 MMOL/L (ref 7–15)
AST SERPL W P-5'-P-CCNC: 43 U/L (ref 0–45)
BACTERIA BLD CULT: NO GROWTH
BACTERIA BLD CULT: NO GROWTH
BACTERIA SPT CULT: ABNORMAL
BACTERIA SPT CULT: ABNORMAL
BASE EXCESS BLDV CALC-SCNC: -3.9 MMOL/L (ref -7.7–1.9)
BASO+EOS+MONOS # BLD AUTO: ABNORMAL 10*3/UL
BASO+EOS+MONOS NFR BLD AUTO: ABNORMAL %
BASOPHILS # BLD AUTO: ABNORMAL 10*3/UL
BASOPHILS # BLD MANUAL: 0 10E3/UL (ref 0–0.2)
BASOPHILS NFR BLD AUTO: ABNORMAL %
BASOPHILS NFR BLD MANUAL: 0 %
BILIRUB DIRECT SERPL-MCNC: <0.2 MG/DL (ref 0–0.3)
BILIRUB SERPL-MCNC: 0.3 MG/DL
BUN SERPL-MCNC: 80.7 MG/DL (ref 8–23)
CA-I BLD-MCNC: 4.6 MG/DL (ref 4.4–5.2)
CALCIUM SERPL-MCNC: 8.4 MG/DL (ref 8.8–10.2)
CHLORIDE SERPL-SCNC: 94 MMOL/L (ref 98–107)
CREAT SERPL-MCNC: 3.73 MG/DL (ref 0.67–1.17)
DEPRECATED HCO3 PLAS-SCNC: 18 MMOL/L (ref 22–29)
EGFRCR SERPLBLD CKD-EPI 2021: 17 ML/MIN/1.73M2
EOSINOPHIL # BLD AUTO: ABNORMAL 10*3/UL
EOSINOPHIL # BLD MANUAL: 1.1 10E3/UL (ref 0–0.7)
EOSINOPHIL NFR BLD AUTO: ABNORMAL %
EOSINOPHIL NFR BLD MANUAL: 7 %
ERYTHROCYTE [DISTWIDTH] IN BLOOD BY AUTOMATED COUNT: 17.7 % (ref 10–15)
GLUCOSE BLDC GLUCOMTR-MCNC: 143 MG/DL (ref 70–99)
GLUCOSE BLDC GLUCOMTR-MCNC: 144 MG/DL (ref 70–99)
GLUCOSE BLDC GLUCOMTR-MCNC: 150 MG/DL (ref 70–99)
GLUCOSE BLDC GLUCOMTR-MCNC: 175 MG/DL (ref 70–99)
GLUCOSE BLDC GLUCOMTR-MCNC: 189 MG/DL (ref 70–99)
GLUCOSE BLDC GLUCOMTR-MCNC: 221 MG/DL (ref 70–99)
GLUCOSE SERPL-MCNC: 204 MG/DL (ref 70–99)
GRAM STAIN RESULT: ABNORMAL
GRAM STAIN RESULT: ABNORMAL
HCO3 BLDV-SCNC: 20 MMOL/L (ref 21–28)
HCT VFR BLD AUTO: 22.3 % (ref 40–53)
HGB BLD-MCNC: 7.5 G/DL (ref 13.3–17.7)
IMM GRANULOCYTES # BLD: ABNORMAL 10*3/UL
IMM GRANULOCYTES NFR BLD: ABNORMAL %
LACTATE SERPL-SCNC: 1.8 MMOL/L (ref 0.7–2)
LYMPHOCYTES # BLD AUTO: ABNORMAL 10*3/UL
LYMPHOCYTES # BLD MANUAL: 0.5 10E3/UL (ref 0.8–5.3)
LYMPHOCYTES NFR BLD AUTO: ABNORMAL %
LYMPHOCYTES NFR BLD MANUAL: 3 %
MAGNESIUM SERPL-MCNC: 2 MG/DL (ref 1.7–2.3)
MCH RBC QN AUTO: 29.6 PG (ref 26.5–33)
MCHC RBC AUTO-ENTMCNC: 33.6 G/DL (ref 31.5–36.5)
MCV RBC AUTO: 88 FL (ref 78–100)
METAMYELOCYTES # BLD MANUAL: 0.2 10E3/UL
METAMYELOCYTES NFR BLD MANUAL: 1 %
MONOCYTES # BLD AUTO: ABNORMAL 10*3/UL
MONOCYTES # BLD MANUAL: 0.9 10E3/UL (ref 0–1.3)
MONOCYTES NFR BLD AUTO: ABNORMAL %
MONOCYTES NFR BLD MANUAL: 6 %
NEUTROPHILS # BLD AUTO: ABNORMAL 10*3/UL
NEUTROPHILS # BLD MANUAL: 12.5 10E3/UL (ref 1.6–8.3)
NEUTROPHILS NFR BLD AUTO: ABNORMAL %
NEUTROPHILS NFR BLD MANUAL: 83 %
NRBC # BLD AUTO: 0 10E3/UL
NRBC BLD AUTO-RTO: 0 /100
O2/TOTAL GAS SETTING VFR VENT: 30 %
OXYHGB MFR BLDV: 96 % (ref 70–75)
PCO2 BLDV: 30 MM HG (ref 40–50)
PH BLDV: 7.43 [PH] (ref 7.32–7.43)
PHOSPHATE SERPL-MCNC: 3.2 MG/DL (ref 2.5–4.5)
PLAT MORPH BLD: ABNORMAL
PLATELET # BLD AUTO: 132 10E3/UL (ref 150–450)
PO2 BLDV: 92 MM HG (ref 25–47)
POTASSIUM SERPL-SCNC: 3.6 MMOL/L (ref 3.4–5.3)
PROT SERPL-MCNC: 5.4 G/DL (ref 6.4–8.3)
RBC # BLD AUTO: 2.53 10E6/UL (ref 4.4–5.9)
RBC MORPH BLD: ABNORMAL
SODIUM SERPL-SCNC: 130 MMOL/L (ref 135–145)
UFH PPP CHRO-ACNC: 0.13 IU/ML
UFH PPP CHRO-ACNC: 0.28 IU/ML
UFH PPP CHRO-ACNC: 0.35 IU/ML
WBC # BLD AUTO: 15.1 10E3/UL (ref 4–11)

## 2023-10-19 PROCEDURE — 250N000011 HC RX IP 250 OP 636

## 2023-10-19 PROCEDURE — 250N000011 HC RX IP 250 OP 636: Performed by: NURSE PRACTITIONER

## 2023-10-19 PROCEDURE — 250N000011 HC RX IP 250 OP 636: Mod: JZ | Performed by: PHARMACIST

## 2023-10-19 PROCEDURE — 83735 ASSAY OF MAGNESIUM: CPT | Performed by: PHYSICIAN ASSISTANT

## 2023-10-19 PROCEDURE — 82805 BLOOD GASES W/O2 SATURATION: CPT | Performed by: STUDENT IN AN ORGANIZED HEALTH CARE EDUCATION/TRAINING PROGRAM

## 2023-10-19 PROCEDURE — 85520 HEPARIN ASSAY: CPT | Performed by: SURGERY

## 2023-10-19 PROCEDURE — 85007 BL SMEAR W/DIFF WBC COUNT: CPT

## 2023-10-19 PROCEDURE — 80053 COMPREHEN METABOLIC PANEL: CPT

## 2023-10-19 PROCEDURE — 200N000002 HC R&B ICU UMMC

## 2023-10-19 PROCEDURE — 82330 ASSAY OF CALCIUM: CPT | Performed by: PHYSICIAN ASSISTANT

## 2023-10-19 PROCEDURE — 250N000011 HC RX IP 250 OP 636: Mod: JZ

## 2023-10-19 PROCEDURE — 250N000009 HC RX 250

## 2023-10-19 PROCEDURE — 250N000013 HC RX MED GY IP 250 OP 250 PS 637: Performed by: PHARMACIST

## 2023-10-19 PROCEDURE — 84100 ASSAY OF PHOSPHORUS: CPT | Performed by: PHYSICIAN ASSISTANT

## 2023-10-19 PROCEDURE — 999N000157 HC STATISTIC RCP TIME EA 10 MIN

## 2023-10-19 PROCEDURE — G0463 HOSPITAL OUTPT CLINIC VISIT: HCPCS

## 2023-10-19 PROCEDURE — 250N000013 HC RX MED GY IP 250 OP 250 PS 637

## 2023-10-19 PROCEDURE — 94003 VENT MGMT INPAT SUBQ DAY: CPT

## 2023-10-19 PROCEDURE — 74018 RADEX ABDOMEN 1 VIEW: CPT | Mod: 26 | Performed by: STUDENT IN AN ORGANIZED HEALTH CARE EDUCATION/TRAINING PROGRAM

## 2023-10-19 PROCEDURE — 99291 CRITICAL CARE FIRST HOUR: CPT | Mod: GC | Performed by: SURGERY

## 2023-10-19 PROCEDURE — 82248 BILIRUBIN DIRECT: CPT

## 2023-10-19 PROCEDURE — 250N000012 HC RX MED GY IP 250 OP 636 PS 637

## 2023-10-19 PROCEDURE — 85048 AUTOMATED LEUKOCYTE COUNT: CPT

## 2023-10-19 PROCEDURE — 250N000011 HC RX IP 250 OP 636: Mod: JZ | Performed by: SURGERY

## 2023-10-19 PROCEDURE — 83605 ASSAY OF LACTIC ACID: CPT | Performed by: PHYSICIAN ASSISTANT

## 2023-10-19 PROCEDURE — 250N000013 HC RX MED GY IP 250 OP 250 PS 637: Performed by: SURGERY

## 2023-10-19 PROCEDURE — 999N000065 XR ABDOMEN PORT 1 VIEW

## 2023-10-19 RX ORDER — QUETIAPINE FUMARATE 50 MG/1
50 TABLET, FILM COATED ORAL AT BEDTIME
Status: DISCONTINUED | OUTPATIENT
Start: 2023-10-19 | End: 2023-10-21

## 2023-10-19 RX ORDER — MAGNESIUM SULFATE HEPTAHYDRATE 40 MG/ML
2 INJECTION, SOLUTION INTRAVENOUS ONCE
Status: COMPLETED | OUTPATIENT
Start: 2023-10-19 | End: 2023-10-19

## 2023-10-19 RX ORDER — QUETIAPINE FUMARATE 25 MG/1
25 TABLET, FILM COATED ORAL EVERY 6 HOURS PRN
Status: DISCONTINUED | OUTPATIENT
Start: 2023-10-19 | End: 2023-10-22

## 2023-10-19 RX ADMIN — HEPARIN SODIUM 1900 UNITS/HR: 10000 INJECTION, SOLUTION INTRAVENOUS at 18:12

## 2023-10-19 RX ADMIN — METHOCARBAMOL 500 MG: 500 TABLET, FILM COATED ORAL at 21:41

## 2023-10-19 RX ADMIN — MAGNESIUM SULFATE HEPTAHYDRATE 2 G: 2 INJECTION, SOLUTION INTRAVENOUS at 08:27

## 2023-10-19 RX ADMIN — ACETAMINOPHEN 975 MG: 325 TABLET, FILM COATED ORAL at 03:05

## 2023-10-19 RX ADMIN — OXYCODONE HYDROCHLORIDE 5 MG: 5 TABLET ORAL at 03:05

## 2023-10-19 RX ADMIN — ACETAMINOPHEN 975 MG: 325 TABLET, FILM COATED ORAL at 09:32

## 2023-10-19 RX ADMIN — ATORVASTATIN CALCIUM 10 MG: 10 TABLET, FILM COATED ORAL at 20:23

## 2023-10-19 RX ADMIN — HYDROMORPHONE HYDROCHLORIDE 0.3 MG: 1 INJECTION, SOLUTION INTRAMUSCULAR; INTRAVENOUS; SUBCUTANEOUS at 13:30

## 2023-10-19 RX ADMIN — CETIRIZINE HYDROCHLORIDE 10 MG: 10 TABLET, FILM COATED ORAL at 20:23

## 2023-10-19 RX ADMIN — Medication 1 MG: at 18:03

## 2023-10-19 RX ADMIN — LABETALOL HYDROCHLORIDE 10 MG: 5 INJECTION, SOLUTION INTRAVENOUS at 21:40

## 2023-10-19 RX ADMIN — ACETAMINOPHEN 975 MG: 325 TABLET, FILM COATED ORAL at 18:02

## 2023-10-19 RX ADMIN — HYDROMORPHONE HYDROCHLORIDE 0.3 MG: 1 INJECTION, SOLUTION INTRAMUSCULAR; INTRAVENOUS; SUBCUTANEOUS at 18:06

## 2023-10-19 RX ADMIN — TRIAMCINOLONE ACETONIDE: 1 OINTMENT TOPICAL at 20:25

## 2023-10-19 RX ADMIN — QUETIAPINE FUMARATE 50 MG: 50 TABLET ORAL at 21:41

## 2023-10-19 RX ADMIN — CHLORHEXIDINE GLUCONATE 15 ML: 1.2 RINSE ORAL at 08:19

## 2023-10-19 RX ADMIN — DEXMEDETOMIDINE HYDROCHLORIDE 0.6 MCG/KG/HR: 400 INJECTION INTRAVENOUS at 06:16

## 2023-10-19 RX ADMIN — OXYCODONE HYDROCHLORIDE 5 MG: 5 TABLET ORAL at 09:52

## 2023-10-19 RX ADMIN — HYDROMORPHONE HYDROCHLORIDE 0.3 MG: 1 INJECTION, SOLUTION INTRAMUSCULAR; INTRAVENOUS; SUBCUTANEOUS at 12:25

## 2023-10-19 RX ADMIN — Medication 15 ML: at 08:06

## 2023-10-19 RX ADMIN — HYDROCORTISONE: 25 CREAM TOPICAL at 20:23

## 2023-10-19 RX ADMIN — NYSTATIN 500000 UNITS: 100000 SUSPENSION ORAL at 08:19

## 2023-10-19 RX ADMIN — OXYCODONE HYDROCHLORIDE 5 MG: 5 TABLET ORAL at 13:18

## 2023-10-19 RX ADMIN — OXYCODONE HYDROCHLORIDE 5 MG: 5 TABLET ORAL at 21:41

## 2023-10-19 RX ADMIN — HYDROCORTISONE: 25 CREAM TOPICAL at 08:33

## 2023-10-19 RX ADMIN — Medication 40 MG: at 09:59

## 2023-10-19 RX ADMIN — INSULIN ASPART 1 UNITS: 100 INJECTION, SOLUTION INTRAVENOUS; SUBCUTANEOUS at 16:47

## 2023-10-19 RX ADMIN — INSULIN ASPART 2 UNITS: 100 INJECTION, SOLUTION INTRAVENOUS; SUBCUTANEOUS at 03:17

## 2023-10-19 RX ADMIN — INSULIN ASPART 2 UNITS: 100 INJECTION, SOLUTION INTRAVENOUS; SUBCUTANEOUS at 20:34

## 2023-10-19 RX ADMIN — INSULIN ASPART 1 UNITS: 100 INJECTION, SOLUTION INTRAVENOUS; SUBCUTANEOUS at 12:18

## 2023-10-19 RX ADMIN — TRIAMCINOLONE ACETONIDE: 1 OINTMENT TOPICAL at 08:50

## 2023-10-19 RX ADMIN — HEPARIN SODIUM 1900 UNITS/HR: 10000 INJECTION, SOLUTION INTRAVENOUS at 10:22

## 2023-10-19 RX ADMIN — INSULIN ASPART 4 UNITS: 100 INJECTION, SOLUTION INTRAVENOUS; SUBCUTANEOUS at 08:43

## 2023-10-19 ASSESSMENT — ACTIVITIES OF DAILY LIVING (ADL)
ADLS_ACUITY_SCORE: 45
ADLS_ACUITY_SCORE: 43
ADLS_ACUITY_SCORE: 45
ADLS_ACUITY_SCORE: 43
ADLS_ACUITY_SCORE: 45
ADLS_ACUITY_SCORE: 45

## 2023-10-19 ASSESSMENT — LIFESTYLE VARIABLES: TOBACCO_USE: 1

## 2023-10-19 NOTE — ANESTHESIA POSTPROCEDURE EVALUATION
Patient: Alfredo Burnham    Procedure: * No procedures listed *       Anesthesia Type:  No value filed.    Note:  Disposition: ICU            ICU Sign Out: Anesthesiologist/ICU physician sign out WAS performed   Postop Pain Control: Uneventful            Sign Out: Well controlled pain   PONV: No   Neuro/Psych: Uneventful            Sign Out: Acceptable/Baseline neuro status   Airway/Respiratory: Uneventful            Sign Out: AIRWAY IN SITU/Resp. Support   CV/Hemodynamics: Uneventful            Sign Out: Acceptable CV status; No obvious hypovolemia; No obvious fluid overload   Other NRE: NONE   DID A NON-ROUTINE EVENT OCCUR? No           Last vitals:  Vitals:    10/19/23 0800 10/19/23 0900 10/19/23 1000   BP: (!) 114/96     Pulse: 100 97 100   Resp: 23 19   Temp: 36.7  C (98  F)     SpO2: 100% 99% 99%       Electronically Signed By: Wilfrid Adame MD  October 19, 2023  12:18 PM

## 2023-10-19 NOTE — PROGRESS NOTES
[facility]  WOC Nurse Inpatient Assessment     Consulted for: right cheek    Patient History (according to provider note(s):      Alfredo Burnham is a 68 year old male who was admitted to the SICU on 9/24/2023 s/p open AAA repair. Patient has a history of HTN and previous TIA. He presented to the ED on 9/24 with right sided abdominal pain and was found to have a contained, ruptured AAA on CT. 30 min super-celiac clamp time. Prolonged intra-renal clamp. 9/25 s/p flex sig showing rectal ischemia, abdominal washout showing a hemostatic AAA graft and no evidence of transmural colonic or small bowel ischemia, and an unsuccessful LLE embolectomy. 9/26 s/p repeat abdominal washout, retroperitoneal closure, LLE wound washout. 9/29 s/p repeat abd washout, bowel appeared well perfused w/o notable ischemia. OR 10/2 for abdominal fascia closure. Extubated 10/4. Unequal pupils and R facial droop, MRI brain showed multiple small infarcts, likely embolic.     Assessment:      Areas visualized during today's visit: Focused: face    Pressure Injury Location: right cheek     Last photo: 10/19  Wound type: Friction and Bruise      Wound history/plan of care: Pt was intubated and RN noted on 10/18 while changing the ETAD. Pt extubated today (10/19)    Wound base: 100 % maroon bruise       Treatment Plan:     No dressing intervention needed.    Orders: Reviewed    RECOMMEND PRIMARY TEAM ORDER: None, at this time  Education provided: plan of care and wound progress  Discussed plan of care with: Family and Nurse  WOC nurse follow-up plan: signing off  Notify WOC if wound(s) deteriorate.  Nursing to notify the Provider(s) and re-consult the WOC Nurse if new skin concern.    DATA:     Current support surface: Standard  Low air loss (FARSHAD pump, Isolibrium, Pulsate, skin guard, etc)  Containment of urine/stool: Incontinent pad in bed  BMI: Body mass index is 26.75 kg/m .   Active diet order:  Orders Placed This Encounter      Advance Diet as Tolerated: Clear Liquid Diet     Output: I/O last 3 completed shifts:  In: 2984 [I.V.:503.6; NG/GT:500]  Out: 2550 [Emesis/NG output:250; Other:2000; Stool:300]     Labs:   Recent Labs   Lab 10/19/23  0317 10/16/23  1616 10/16/23  0431   ALBUMIN 2.4*   < > 2.5*   PREALB  --   --  17   HGB 7.5*   < > 7.8*   INR  --   --  1.32*   WBC 15.1*   < > 13.5*    < > = values in this interval not displayed.     Pressure injury risk assessment:   Sensory Perception: 3-->slightly limited  Moisture: 2-->very moist  Activity: 1-->bedfast  Mobility: 2-->very limited  Nutrition: 2-->probably inadequate  Friction and Shear: 1-->problem  Edson Score: 11      Pager no longer is use, please contact through Cait Chavira group: [facility] Nurse [place]  Dept. Office Number: [phone]

## 2023-10-19 NOTE — PROGRESS NOTES
[facility], Procedure Note          Extubation:       Alfredo Burnham  MRN# [id number]   October 19, 2023, 11:13 AM         Patient extubated at: October 19, 2023, 11:13 AM   Supplemental Oxygen: Via nasal cannula at 4 liters per minute   Cough: The cough is good   Secretion Mode: Able to clear   Secretion Amount: Moderate amount, moderately thick and white / yellow in color   Respiratory Exam:: Breath sounds: good aeration and clear     Location: bilaterally   Skin Exam:: Patient color: natural   Patient Status: Currently appears comfortable   Arterial Blood Gasses: pH Arterial (no units)   Date Value   10/14/2023 7.41   10/14/2023 7.41     pO2 Arterial (mm Hg)   Date Value   10/14/2023 85   10/14/2023 85     pCO2 Arterial (mm Hg)   Date Value   10/14/2023 41   10/14/2023 41     Bicarbonate Arterial (mmol/L)   Date Value   10/14/2023 26   10/14/2023 26            Recorded by [place] Ramirez, RT

## 2023-10-19 NOTE — PROGRESS NOTES
CLINICAL NUTRITION SERVICES - BRIEF NOTE     Reason for RD note: Advance EN, discontinue TPN    New Findings/Chart Review:  Nutrition support: Osmolite 1.5 Juvencio @ 20 ml/hr    Enteral Access: NDT.  NG placed 10/19 d/t emesis    Respiratory: Intubated, plan for extubation today    GI: rectal tube placed yesterday after 3 days without BM.  Bowel regimen held.    Interventions:  Advance EN to goal: Osmolite 1.5 Juvencio (or equivalent) @ goal of 50ml/hr (1200ml/day) + 1 pkt ProSource TF20 TID (3 pkts total) provides: 2040 kcals (28 kcal/kg), 135 g PRO (1.9 g pro/kg), 914 ml free H20, 244 g CHO, and 0 g fiber daily        - Increase to 30 ml/hr, adv by 10ml q 6 hours  Collaboration with providers - per discussion in rounds, plan to let current TPN bag run out as EN advances    Future/Additional Recommendations:  Monitor EN advancement / tolerance via NDT  Monitor stool trends  Monitor lytes / need for renal formula    Nutrition will continue to follow per protocol.    Natty Zeng, RD, LD, CNSC  [address] pager: [phone]  Ascom: [id number]  Weekend/Holiday RD pager [phone]

## 2023-10-19 NOTE — PROGRESS NOTES
ICU Staff:     I reviewed the patient's notes, the labs, the imaging studies, and the detailed patient history and exam findings with the [facility] ICU care team members. I discussed the patient's ICU clinical assessments and the intensive care plans with the [facility] ICU team members today, Thursday, the 19th of October, 2023. I attended the multidisciplinary care rounds today. The patient is a 68-year-old man admitted to the SICU after the repair of a contained ruptured AAA that required ICU care.  The patient extubated with difficulty today.  Bedside lung ultrasound was performed today and confirm the safety of extubation today.      PAST MEDICAL HISTORY:  Past Medical History:   Diagnosis Date    Hypertension 2/8/2011    Macular degeneration      PAST SURGICAL HISTORY:  Past Surgical History:   Procedure Laterality Date    AMPUTATE LEG ABOVE KNEE Left 10/17/2023    Procedure: AMPUTATION, ABOVE KNEE and Abdominal wound vac exchange;  Surgeon: Ash Boucher MBBS;  [facility]    INCISION AND DRAINAGE ABDOMEN WASHOUT, COMBINED N/A 9/25/2023    Procedure: abdominal washout, combined, repacking,  abthera placement;  Surgeon: Ash Boucher MBBS;  [facility]    INCISION AND DRAINAGE ABDOMEN WASHOUT, COMBINED N/A 9/26/2023    Procedure: Abdominal washout, Retroperitoneal closure, washout left lower extremity wound;  Surgeon: Boris Lowe;  [facility]    IR OR ANGIOGRAM  9/25/2023    IRRIGATION AND DEBRIDEMENT ABDOMEN WASHOUT, COMBINED N/A 9/29/2023    Procedure: exploration of  ABDOMINAL CAVITY, placement of abthera;  Surgeon: Boris Lowe;  [facility]    IRRIGATION AND DEBRIDEMENT ABDOMEN WASHOUT, COMBINED N/A 10/2/2023    Procedure: Exploratory laparotomy, abominal closure, wound vac change left lower extremity;  Surgeon: Jonathan Fry MD;  [facility]    IRRIGATION AND DEBRIDEMENT LOWER EXTREMITY, COMBINED Left 9/29/2023    Procedure: exploration of left lower  extremity, partial closure of left lower extremity, wound vac placement;  Surgeon: Boris Lowe;  [facility]    LAPAROTOMY EXPLORATORY N/A 9/25/2023    Procedure: Laparotomy exploratory;  Surgeon: Roger Shirley MD;  [facility]    REPAIR ANEURYSM ABDOMINAL AORTA N/A 9/24/2023    Procedure: Resection of Ruptureed Abdominal Aneurysm, Aortic biliary bypass with 20 x 10 mm Bifurcated hemagard graft, Temporary Abdominal Closure, Endovascular Balloon Inclusion of Aorta;  Surgeon: Boris Lowe;  [facility]    SIGMOIDOSCOPY FLEXIBLE N/A 9/25/2023    Procedure: Sigmoidoscopy flexible;  Surgeon: Gabino Walker MD;  [facility]    THROMBECTOMY LOWER EXTREMITY Left 9/25/2023    Procedure: SFA, popliteal, and tibial thromboembolectomy and four compartment fasciotomy;  Surgeon: Ash Boucher MBBS;  [facility]     MEDICATIONS:  Current Facility-Administered Medications   Medication    acetaminophen (TYLENOL) tablet 650 mg    acetaminophen (TYLENOL) tablet 975 mg    [START ON 10/20/2023] alteplase (CATHFLO ACTIVASE) injection 2 mg    [START ON 10/20/2023] alteplase (CATHFLO ACTIVASE) injection 2 mg    atorvastatin (LIPITOR) tablet 10 mg    benzocaine 20% (HURRICAINE/TOPEX) 20 % spray 0.5-1 mL    bisacodyl (DULCOLAX) suppository 10 mg    cetirizine (zyrTEC) tablet 10 mg    dexmedeTOMIDine (PRECEDEX) 4 mcg/mL in NS infusion    dextrose 10% infusion    dextrose 10% infusion    glucose gel 15-30 g    Or    dextrose 50 % injection 25-50 mL    Or    glucagon injection 1 mg    diphenhydrAMINE-zinc acetate (BENADRYL) 1-0.1 % cream    [START ON 10/20/2023] epoetin viviana-epbx (RETACRIT) injection 4,000 Units    heparin infusion 25,000 units in D5W 250 mL ANTICOAGULANT    hydrALAZINE (APRESOLINE) injection 10-20 mg    hydrocortisone 2.5 % cream    HYDROmorphone (PF) (DILAUDID) injection 0.3 mg    HYDROmorphone (PF) (DILAUDID) injection 0.3 mg    insulin aspart (NovoLOG) injection (RAPID  ACTING)    labetalol (NORMODYNE/TRANDATE) injection 10-20 mg    levofloxacin (LEVAQUIN) infusion 500 mg    lidocaine (XYLOCAINE) 2 % external gel    lidocaine (XYLOCAINE) 2 % external gel    melatonin tablet 1 mg    methocarbamol (ROBAXIN) tablet 500 mg    midodrine (PROAMATINE) tablet 20 mg    multivitamins w/minerals liquid 15 mL    naloxone (NARCAN) injection 0.2 mg    Or    naloxone (NARCAN) injection 0.4 mg    Or    naloxone (NARCAN) injection 0.2 mg    Or    naloxone (NARCAN) injection 0.4 mg    [START ON 10/20/2023] No heparin via hemodialysis machine    ondansetron (ZOFRAN ODT) ODT tab 4 mg    Or    ondansetron (ZOFRAN) injection 4 mg    oxyCODONE (ROXICODONE) tablet 5-10 mg    parenteral nutrition - ADULT compounded formula    [Held by provider] polyethylene glycol (MIRALAX) Packet 17 g    prochlorperazine (COMPAZINE) injection 5 mg    Or    prochlorperazine (COMPAZINE) tablet 5 mg    protein modular (PROSOURCE TF20) packet 1 packet    [Held by provider] senna-docusate (SENOKOT-S/PERICOLACE) 8.6-50 MG per tablet 1 tablet    [START ON 10/20/2023] sodium chloride (PF) 0.9% PF flush 10 mL    [START ON 10/20/2023] sodium chloride (PF) 0.9% PF flush 10 mL    [START ON 10/20/2023] sodium chloride (PF) 0.9% PF flush 9 mL    [START ON 10/20/2023] sodium chloride (PF) 0.9% PF flush 9 mL    [START ON 10/20/2023] sodium chloride 0.9% BOLUS 100-150 mL    [START ON 10/20/2023] sodium chloride 0.9% BOLUS 250 mL    [START ON 10/20/2023] sodium chloride 0.9% BOLUS 300 mL    triamcinolone (KENALOG) 0.1 % ointment     ALLERGIES:  Allergies   Allergen Reactions    Penicillins Swelling     Facial swelling    Has tolerated cefazolin, cefepime     SOCIAL HISTORY:  Social History     Socioeconomic History    Marital status:    Tobacco Use    Smoking status: Every Day     Packs/day: .5     Types: Cigarettes   Substance and Sexual Activity    Alcohol use: Yes     Comment: 1-2/wk    Drug use: No    Sexual activity: Yes      "Partners: Female   Other Topics Concern    Parent/sibling w/ CABG, MI or angioplasty before 65F 55M? Yes     FAMILY HISTORY:  Family History   Problem Relation Age of Onset    Unknown/Adopted Mother     Heart Disease Mother     Arthritis Mother     Hypertension Brother     Blood Disease Sister     Psychotic Disorder Sister      Vital Signs: BP (!) 114/96   Pulse 115   Temp 97.9  F (36.6  C)   Resp 27   Ht 1.727 m (5' 8\")   Wt 79.8 kg (175 lb 14.8 oz)   SpO2 100%   BMI 26.75 kg/m      A/P: I agree with the documentation in the physical exam findings, the clinical ICU assessment, and the ICU care plan in the ICU progress note written today by the members of the ICU team on Thursday, the 19th of October, 2023. I supervised the patient assessment and orders with the [facility] ICU team todayThursday the 19th of October, 2023. I personally examined and evaluated the patient today in the [facility] SICU. The patient remains critically ill today. All of the ICU patient care orders and treatments were placed at my direction. I personally managed the patient's ICU supportive measures that were required, as the patient's critical illness created a continuous threat of loss of life for the patient. I made key essential decisions of care today to maintain life-saving, effective ICU-supportive care. I spent 35 minutes providing critical care services (exclusive of any time required to perform bedside procedures) in the ICU and at the bedside in the critical care unit, evaluating the patient, directing care, and reviewing the patient's laboratory values and the patient's radiologic imaging reports associated with the patient's critical illness. I have evaluated all laboratory values and imaging studies from the past 24 hours. I actively assessed this acute critically ill patient, and I personally provided supportive interventions that were required as the patient has acute and persistent, imminently life-threatening organ " system failure. The critical care provided required high complexity decision making and clinical evaluation as the patient has an acute critical illness that impairs one or more vital organs. The patient has a probability of imminent or life-threatening deterioration. I personally spent 35 minutes of Critical Care Time on Thursday, the 19th of October, 2023, which was exclusive of any time required to perform any bedside procedures. Life saving  interventions today included the determination for the extubation of the patinet today.  The Critical Care Time was required to personally provide and direct critical care services at the bedside and in the critical care unit for this acutely critically ill patient. I personally managed the patient's sedation, pain control and analgesia, metabolic abnormalities, nutritional status, and vasoactive medications.    Alfredo Mccall MD, PhD

## 2023-10-19 NOTE — PROGRESS NOTES
I have been able to see Mr. Mcgraw every day this week.  I did not do a chance to visit with family today however I was able to visit with the wife yesterday and several days this week.  Overall he is doing better than over the weekend.  He is status post above-knee amputation with my partner Dr. Boucher earlier in the week.  He had a tunneled dialysis line placed this morning he is alert and is able to tell me that he does not have pain.  The ICU plans to extubate him today.  He has restarted on enteral feeding with postpyloric feeds and has a nasogastric tube for decompression of the stomach given previous issue with aspiration.  We will continue him on heparin.  I am optimistic he will be able to be extubated.  We will plan to take him tomorrow for closure of his midline abdominal skin.

## 2023-10-19 NOTE — PLAN OF CARE
Major Shift Events:  Alert/lethargic, unable to determine orientation. Follows simple commands. Moves upper extremities without difficulty. Withdraws in RLE. LLE below the knee amputation. Mitts and soft restraints on d/t line pulling. Pupils unequal and reactive. Sinus rhythm/sinus tach with multiple PVCs. MAP goal greater than 65, meets without intervention. CMV settings, lung sounds coarse/diminished. No void, rectal tube in place, bowel sounds active. ND in place, tube feeds at 20 with standard flushes. NG in place to low intermittent suction. Abd firm/non distended. Abd wound vac in place on surgical incision with no output. Dex @ 0.6, TPN @ 50, and Lipids @ 20.    Plan: Pressure support during day, possible extubation. Continue with plan of care and notify team of any changes.  For vital signs and complete assessments, please see documentation flowsheets.

## 2023-10-19 NOTE — ANESTHESIA PREPROCEDURE EVALUATION
Anesthesia Pre-Procedure Evaluation    Patient: Alfredo Burnham   MRN: [id number] : 1955        Procedure : * No procedures listed *          Past Medical History:   Diagnosis Date    Hypertension 2011    Macular degeneration       Past Surgical History:   Procedure Laterality Date    AMPUTATE LEG ABOVE KNEE Left 10/17/2023    Procedure: AMPUTATION, ABOVE KNEE and Abdominal wound vac exchange;  Surgeon: Ash Boucher MBBS;  [facility]    INCISION AND DRAINAGE ABDOMEN WASHOUT, COMBINED N/A 2023    Procedure: abdominal washout, combined, repacking,  abthera placement;  Surgeon: Ash Boucher MBBS;  [facility]    INCISION AND DRAINAGE ABDOMEN WASHOUT, COMBINED N/A 2023    Procedure: Abdominal washout, Retroperitoneal closure, washout left lower extremity wound;  Surgeon: Boris Lowe;  [facility]    IR OR ANGIOGRAM  2023    IRRIGATION AND DEBRIDEMENT ABDOMEN WASHOUT, COMBINED N/A 2023    Procedure: exploration of  ABDOMINAL CAVITY, placement of abthera;  Surgeon: Boris Lowe;  [facility]    IRRIGATION AND DEBRIDEMENT ABDOMEN WASHOUT, COMBINED N/A 10/2/2023    Procedure: Exploratory laparotomy, abominal closure, wound vac change left lower extremity;  Surgeon: Jonathan Fry MD;  [facility]    IRRIGATION AND DEBRIDEMENT LOWER EXTREMITY, COMBINED Left 2023    Procedure: exploration of left lower extremity, partial closure of left lower extremity, wound vac placement;  Surgeon: Boris Lowe;  [facility]    LAPAROTOMY EXPLORATORY N/A 2023    Procedure: Laparotomy exploratory;  Surgeon: Roger Shirley MD;  [facility]    REPAIR ANEURYSM ABDOMINAL AORTA N/A 2023    Procedure: Resection of Ruptureed Abdominal Aneurysm, Aortic biliary bypass with 20 x 10 mm Bifurcated hemagard graft, Temporary Abdominal Closure, Endovascular Balloon Inclusion of Aorta;  Surgeon: Boris Lowe;  [facility]     SIGMOIDOSCOPY FLEXIBLE N/A 9/25/2023    Procedure: Sigmoidoscopy flexible;  Surgeon: Gabino Walker MD;  [facility]    THROMBECTOMY LOWER EXTREMITY Left 9/25/2023    Procedure: SFA, popliteal, and tibial thromboembolectomy and four compartment fasciotomy;  Surgeon: Ash Boucher MBBS;  [facility]      Allergies   Allergen Reactions    Penicillins Swelling     Facial swelling    Has tolerated cefazolin, cefepime      Social History     Tobacco Use    Smoking status: Every Day     Packs/day: .5     Types: Cigarettes    Smokeless tobacco: Not on file   Substance Use Topics    Alcohol use: Yes     Comment: 1-2/wk      Wt Readings from Last 1 Encounters:   10/17/23 79.8 kg (175 lb 14.8 oz)        Anesthesia Evaluation   Pt has had prior anesthetic. Type: General.        ROS/MED HX  ENT/Pulmonary: Comment: The patient is mechanical ventilated.    (+)     RIGO risk factors,  hypertension,         tobacco use, Current use,                      Neurologic:     (+)              TIA,                  Cardiovascular: Comment: S/p open AAA repair 9/24/23 c/b possible embolic stroke, LLE ischemia    (+)  hypertension- Peripheral Vascular Disease-   -  - -                                      METS/Exercise Tolerance:     Hematologic: Comments: Thrombocytopenia  Hgb 8.0    (+)      anemia,          Musculoskeletal:       GI/Hepatic:     (+) GERD,                   Renal/Genitourinary: Comment: On CRRT - neg Renal ROS   (+) renal disease,             Endo:  - neg endo ROS  (-) Type II DM   Psychiatric/Substance Use:     (+)   alcohol abuse      Infectious Disease:  - neg infectious disease ROS     Malignancy:       Other:            Physical Exam    Airway      Comment: intubated         Respiratory Devices and Support         Dental           Cardiovascular             Pulmonary                   OUTSIDE LABS:  CBC:   Lab Results   Component Value Date    WBC 15.1 (H) 10/19/2023    WBC 14.3 (H) 10/18/2023    HGB  "7.5 (L) 10/19/2023    HGB 6.8 (LL) 10/18/2023    HCT 22.3 (L) 10/19/2023    HCT 20.6 (L) 10/18/2023     (L) 10/19/2023     (L) 10/18/2023     BMP:   Lab Results   Component Value Date     (L) 10/19/2023     (L) 10/18/2023    POTASSIUM 3.6 10/19/2023    POTASSIUM 4.1 10/18/2023    CHLORIDE 94 (L) 10/19/2023    CHLORIDE 90 (L) 10/18/2023    CO2 18 (L) 10/19/2023    CO2 15 (L) 10/18/2023    BUN 80.7 (H) 10/19/2023    .0 (H) 10/18/2023    CR 3.73 (H) 10/19/2023    CR 5.66 (H) 10/18/2023     (H) 10/19/2023     (H) 10/19/2023     COAGS:   Lab Results   Component Value Date    PTT 50 (H) 10/16/2023    INR 1.32 (H) 10/16/2023    FIBR 659 (H) 10/15/2023     POC: No results found for: \"BGM\", \"HCG\", \"HCGS\"  HEPATIC:   Lab Results   Component Value Date    ALBUMIN 2.4 (L) 10/19/2023    PROTTOTAL 5.4 (L) 10/19/2023    ALT 30 10/19/2023    AST 43 10/19/2023    ALKPHOS 103 10/19/2023    BILITOTAL 0.3 10/19/2023     OTHER:   Lab Results   Component Value Date    PH 7.41 10/14/2023    PH 7.41 10/14/2023    LACT 1.8 10/19/2023    A1C 5.7 (H) 09/25/2023    OMAR 8.4 (L) 10/19/2023    PHOS 3.2 10/19/2023    MAG 2.0 10/19/2023    LIPASE 51 10/14/2023    TSH 2.978 02/24/2009       Anesthesia Plan    ASA Status:  4    - Procedure: Procedure only, no anesthetic delivered                    Consents            Postoperative Care            Comments:                Wilfrid Adame MD  "

## 2023-10-19 NOTE — PROGRESS NOTES
Nephrology Progress Note  10/19/2023       Alfredo Burnham is a 68 yom with complex hx of TIA, HTN, blindness who presented to [facility] 9/24 with severe abdominal pain radiating to flank and back, CT revealed AAA with a contained rupture.  Taken to OR for aortobiiliac bypass and resection of ruptured pararenal aneurysm.   Post op course complicated by hypotension requiring pressors and metabolic acidosis.  Baseline Cr 1.0 on presentation but on the rise to >5 at time of renal consult for MAYA management and possible RRT.       Interval History :   Mr Burnham had CRRT stopped 10/4, generally has been stable on iHD since then but had resp arrest immediately post HD run on 10/13 requiring return to ICU and intubation.  Nearing re-extubation, ran HD yesterday and we were able to pull 2L.  Labs not corrected as much as expected (Na still a bit low and bicarb still 18) but I suspect this is due to some BFR issues with temp line which is now exchanged for tunneled HD line yesterday.  Orders in for 4h run tomorrow, will pull 2L to essentially match intake.      Assessment & Recommendations:   MAYA-Baseline Cr 1.0, ordered UA.  CT showed benign cyst but otherwise normal kidneys.  Cr on the rise since surgery, did receive contrast for CTA.  Urine microscopy showed granular casts suggesting ATN which will recover with time and stabilizing hemodynamics.  Started on CRRT 9/30 for volume and clearance with minimal UOP and rising Cr.                  -Started CRRT 9/30 for volume and clearance, stopped 10/4 and now running iHD PRN (generally on MWF) and watching for recovery.  Infarcts seen on CT 10/13 lowers chances of recovery.       -Appreciate IR placing tunneled line RIJ 10/18.                  -Dialysis consent signed and scanned into media tab.      Volume-Wt on downtrend overall but up the past 2 days without HD, BP's reasonable overnight and has some edema so will plan to pull 2-3L to account for gains the past ~48h.   "    Electrolytes-K 4.0, bicarb 18 with high AG/negative lactate.       BMD-Ca 8.4, Mg 2.0.  Phos noted to be high at 3.2, would use binder but we have stopped TF and starting TPN so binder would not be effective.  No phos in TPN.       Anemia-Hgb 7.5, ~14 on presentation, acute management per team.       Nutrition-On TPN, no phos in TPN formula.       Time spent: 40 minutes on this date of encounter for chart review, physical exam, medical decision making and co-ordination of care.      Seen and discussed with Dr Mccauley     Recommendations were communicated to primary team via verbal communication.        ALTAGRACIA Jiménez CNS  Clinical Nurse Specialist  [phone]    Review of Systems:   I reviewed the following systems:  ROS not done due to lethargy    Physical Exam:   I/O last 3 completed shifts:  In: 2984 [I.V.:503.6; NG/GT:500]  Out: 2550 [Emesis/NG output:250; Other:2000; Stool:300]   BP (!) 114/96   Pulse 100   Temp 98  F (36.7  C)   Resp 23   Ht 1.727 m (5' 8\")   Wt 79.8 kg (175 lb 14.8 oz)   SpO2 100%   BMI 26.75 kg/m       GENERAL APPEARANCE: Extubated, lethargic, in no distress.   EYES: No scleral icterus  Pulmonary: On vent 40%/8 of PEEP  CV: Regular rhythm, normal rate   - Edema +1-2 generalized, + scrotal edema.    GI: distended, nontender  MS: no evidence of inflammation in joints, no muscle tenderness  : No Lu  SKIN: no rash, warm, dry  NEURO: No focal deficits.         Labs:   All labs reviewed by me  Electrolytes/Renal -   Recent Labs   Lab Test 10/19/23  0841 10/19/23  0317 10/19/23  0316 10/18/23  0409 10/18/23  0344 10/17/23  0340 10/17/23  0335   NA  --  130*  --   --  128*  --  132*   POTASSIUM  --  3.6  --   --  4.1  --  3.4   CHLORIDE  --  94*  --   --  90*  --  94*   CO2  --  18*  --   --  15*  --  20*   BUN  --  80.7*  --   --  110.0*  --  74.9*   CR  --  3.73*  --   --  5.66*  --  4.26*   * 204* 189*   < > 171*   < > 152*   OMAR  --  8.4*  --   --  8.0*  --  8.2* "   MAG  --  2.0  --   --  2.5*  --  1.9   PHOS  --  3.2  --   --  5.0*  --  4.3    < > = values in this interval not displayed.       CBC -   Recent Labs   Lab Test 10/19/23  0317 10/18/23  0344 10/17/23  1155   WBC 15.1* 14.3* 14.6*   HGB 7.5* 6.8* 7.3*   * 131* 130*       LFTs -   Recent Labs   Lab Test 10/19/23  0317 10/18/23  0344 10/17/23  0335   ALKPHOS 103 96 87   BILITOTAL 0.3 0.3 0.4   ALT 30 32 32   AST 43 49* 51*   PROTTOTAL 5.4* 5.6* 5.5*   ALBUMIN 2.4* 2.5* 2.5*       Iron Panel - No lab results found.        Current Medications:   acetaminophen  975 mg Oral or Feeding Tube Q6H    atorvastatin  10 mg Oral or Feeding Tube QPM    chlorhexidine  15 mL Mouth/Throat Q12H    [START ON 10/20/2023] epoetin viviana-epbx  4,000 Units Intravenous Once in dialysis/CRRT    hydrocortisone   Topical BID    insulin aspart  1-12 Units Subcutaneous Q4H    levofloxacin  500 mg Intravenous Q48H    melatonin  1 mg Oral or Feeding Tube QPM    multivitamins w/minerals  15 mL Per Feeding Tube Daily    [START ON 10/20/2023] - MEDICATION INSTRUCTIONS -   Does not apply Once    pantoprazole  40 mg Per Feeding Tube QAM AC    [Held by provider] polyethylene glycol  17 g Oral or Feeding Tube Daily    protein modular  1 packet Per Feeding Tube TID    [Held by provider] senna-docusate  1 tablet Oral or Feeding Tube BID    [START ON 10/20/2023] sodium chloride (PF)  9 mL Intracatheter During Dialysis/CRRT (from stock)    [START ON 10/20/2023] sodium chloride (PF)  9 mL Intracatheter During Dialysis/CRRT (from stock)    [START ON 10/20/2023] sodium chloride 0.9%  250 mL Intravenous Once in dialysis/CRRT    [START ON 10/20/2023] sodium chloride 0.9%  300 mL Hemodialysis Machine Once    triamcinolone   Topical BID      dexmedeTOMIDine 0.2 mcg/kg/hr (10/19/23 1006)    dextrose      dextrose      heparin 1,900 Units/hr (10/19/23 0700)    parenteral nutrition - ADULT compounded formula 50 mL/hr at 10/19/23 0700

## 2023-10-19 NOTE — ANESTHESIA CARE TRANSFER NOTE
Patient: Alfredo Burnham    Procedure: * No procedures listed *       Diagnosis: * No pre-op diagnosis entered *  Diagnosis Additional Information: No value filed.    Anesthesia Type:   No value filed.     Note:      Level of consciousness: sedated/intubated.              Patient transferred to: ICU    ICU Handoff: Call for PAUSE to initiate/utilize ICU HANDOFF, Identified Patient, Identified Responsible Provider, Reviewed the Pertinent Medical History, Discussed Surgical Course, Reviewed Intra-OP Anesthesia Management and Issues during Anesthesia, Set Expectations for Post Procedure Period and Allowed Opportunity for Questions and Acknowledgement of Understanding      Vitals:  Vitals Value Taken Time   BP     Temp     Pulse 104 10/19/23 1217   Resp 21 10/19/23 1217   SpO2 99 % 10/19/23 1217   Vitals shown include unfiled device data.    Electronically Signed By: Wilfrid Adame MD  October 19, 2023  12:17 PM

## 2023-10-19 NOTE — PROGRESS NOTES
Vascular Surgery Progress Note  10/19/2023       Subjective:  Yesterday afternoon with a large liquid BM, soaked stump dressing, Xeroform over incision was clean.  Stump rewrapped sterilely with Kerlix and Ace wrap.  Underwent tunneled line placement with IR.  Remains hemodynamically stable off pressors, heparin drip was resumed post line placement, TF resumed, follow simple commands, plan for extubation today.  With ongoing diffuse truncal rash, followed by dermatology.     Objective:  Temp:  [98.2  F (36.8  C)-100.2  F (37.9  C)] 98.3  F (36.8  C)  Pulse:  [] 102  Resp:  [17-30] 22  BP: ()/() 130/75  MAP:  [62 mmHg-117 mmHg] 84 mmHg  Arterial Line BP: ()/(48-83) 133/58  FiO2 (%):  [30 %] 30 %  SpO2:  [97 %-100 %] 100 %    I/O last 3 completed shifts:  In: 2964 [I.V.:503.6; NG/GT:500]  Out: 2550 [Emesis/NG output:250; Other:2000; Stool:300]      Gen: resting comfortably in bed in ICU, lightly sedated, intubated, moving upper extremities spontaneously.  CV: RR per DP pulse, off pressors, regular HR per tele   Resp: intubated, on minimal vent settings  Abd: Wound vac in place, holding seal, abd binder on  Ext: RLE wwp, palpable DP pulse, LLE stump with ACE WRAP    Labs:  Recent Labs   Lab 10/19/23  0317 10/18/23  0344 10/17/23  1155   WBC 15.1* 14.3* 14.6*   HGB 7.5* 6.8* 7.3*   * 131* 130*       Recent Labs   Lab 10/19/23  0317 10/19/23  0316 10/18/23  2350 10/18/23  0409 10/18/23  0344 10/17/23  0340 10/17/23  0335   *  --   --   --  128*  --  132*   POTASSIUM 3.6  --   --   --  4.1  --  3.4   CHLORIDE 94*  --   --   --  90*  --  94*   CO2 18*  --   --   --  15*  --  20*   BUN 80.7*  --   --   --  110.0*  --  74.9*   CR 3.73*  --   --   --  5.66*  --  4.26*   * 189* 190*   < > 171*   < > 152*   OMAR 8.4*  --   --   --  8.0*  --  8.2*   MAG 2.0  --   --   --  2.5*  --  1.9   PHOS 3.2  --   --   --  5.0*  --  4.3    < > = values in this interval not displayed.       Imaging  Narrative & Impression   EXAMINATION: CT CHEST/ABDOMEN/PELVIS W CONTRAST, 10/13/2023 11:33 PM     COMPARISON STUDY: Abdominal radiograph 10/10/2023     INDICATION: new desat episode, hypotension, eval ? infectious source     TECHNIQUE: CT scan of the chest, abdomen and pelvis was performed on  multidetector CT scanner using volumetric acquisition technique and  images were reconstructed in multiple planes with variable thickness  and reviewed on dedicated workstations.      CONTRAST: iopamidol (ISOVUE-370) solution 104 m. Without oral  contrast.     CT scan radiation dose is optimized to minimum requisite dose using  automated dose modulation techniques.     Findings:      Chest:  Lungs: Bilateral small-to-moderate pleural effusions with associated  atelectasis and ground glass opacities, left greater than right.  Basilar predominant interlobular septal thickening. No pneumothorax.     Mediastinum: Heart size is within normal limits. No pericardial  effusion. Aorta and main pulmonary artery caliber are within normal  limits No mediastinal lymphadenopathy. Small volume of air within the  left brachiocephalic vein (series 5, image 33) just prior to entering  the superior vena cava, likely iatrogenic secondary left upper  extremity central venous catheter. Enteric tube coursing the esophagus  terminating in the stomach lumen, otherwise the esophagus is  unremarkable.         Abdomen/Pelvis:    Liver: No mass. Mild intrahepatic biliary dilatation. Hypodensity at  the lateral aspect of the right hepatic lobe measuring up to 11 mm  (series 5, image 103).      Gallbladder/CBD: Mildly distended gallbladder with layering sludge.  Common bile duct within normal limits for patient's age.     Pancreas: Pancreas appears to be normal enhancing with peripancreatic  hypoattenuating collection this primarily hypoattenuating but with  areas of increased hyperattenuation. This suggests peripancreatic  fluid collection with  hemorrhage.     Spleen: Surrounded by hypoattenuating fluid, otherwise unremarkable.     Adrenal glands: Normal.     Kidneys/ureters/bladder: Heterogeneous hypoattenuation of the kidneys  bilaterally with wedgelike confluent hypoattenuation in the interpolar  and superior pole of the left kidney (series series 5, images  127-156). No obstructing renal stone, hydronephrosis, renal masses.      Genitourinary/reproductive tract: Normal bladder. Reproductive organs  are within normal limits.     Gastrointestinal: Hypoattenuating of the large bowel with wall  thickening starting from the rectum and extending to the mid  transverse colon with surrounding fat stranding (160-257). Colonic  diverticulosis. Normal caliber small bowel. Appendix not seen. Stomach  and esophagus are unremarkable.     Vasculature: Postsurgical changes of ruptured AAA open repair with  mixed attenuation clot surrounding the postsurgical repairs. Narrow  lumen inferior vena cava compressed by hematoma at the level of the  kidneys. Small hematoma at the aortic bifurcation. Patent hepatic  portal vein.     Retroperitoneum/peritoneum/mesentery: Ascites throughout the  peritoneal cavity surrounding the liver or spleen, infrarenal aorta  and layering in the pelvis. 2 cm hematoma at the aortic bifurcation. 2  cm hematoma just below the level of the renal veins.     Bones: No acute or aggressive appearing osseous bodies. Ultimately  degenerative changes of the spine.     Soft tissues: Enlarged left iliacus muscle with hypodense  heterogeneous appearance measuring up to 6.6 cm (series 5, image 224).  Diffuse anasarca.                                                                      IMPRESSION:   1.  Enlarged heterogeneously hypoattenuating left illiacus muscle  measuring up to 6.6 cm. Findings may represent developing iliacus  hematoma. Consider CTA abdomen/pelvis to further evaluate for active  bleeding within the hematoma.  2.  Narrow caliber  infrahepatic IVC with appearance of compression in  between thrombus secondary to AAA rupture and abdominal aorta;  findings may represent both hypotension and potential compression  secondary to the surrounding hematoma.   3.  Bilateral, left greater than right, wedge-shaped hypodensities  suggesting infarcts.   4.  Edematous left colon. There appears to be mucosal enhancement. No  evidence of pneumatosis intestinalis or free air in the abdomen.  5.  Pancreas appears perfused but there is peripancreatic fluid with  hemorrhage. No pseudoaneurysm or extravasation identified.   6.  Diffuse intraperitoneal ascites and mixed attenuating hematoma,  along the right inferior renal aorta and aortic bifurcation, likely  related to recent AAA rupture and subsequent repair.  7.  Bilateral pleural effusions with associated atelectasis and  groundglass opacities, likely representing pulmonary edema.  8.  Diffuse anasarca.              [Urgent Result: Suspected left iliac is hematoma measuring up 6.6 cm;  suspected devascularized interpolar and superior left kidney.]     Finding was identified on 10/14/2023 12:09 PM.        Assessment/Plan:   68 year old male with PMH of HTN and previous TIA who presented with R sided abdominal pain found to have contained ruptured AAA, now s/p open AAA repair 9/24 into 9/25am with 30 min supraceliac clamp time, prolonged inter-renal clamp time, temporary abdominal closure. He did have bloody stool postop with flex sig 9/25 demonstrating ischemic mucosal changes of the rectum, repeated abdominal without evidence of transmural necrosis of the small or large intestine, or the rectum. On 9/29 he was started on CRRT given ongoing low UOP and rising Cr and failure of lasix challenge. Now on iHD. Hemodynamically continues to do well off pressors. S/p closure of abdominal fascia and subcutaneous tissue left open with wound VAC applied 10/2/23. With ischemic LLE, AKA deferred until 10/17/2023 due to  leukocytosis, hypoxia requiring reintubation (10/13/23) and critical illness. ADDIS on 10/16/23 without evidence of embolic source. 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 Plan for abdominal closure tomorrow.    Neuro:   - Sedation per SICU  - Appreciate assessment neuro status  - Stroke workup negative -- MRI with multiple small infarcts, follow-up with neurology 4-6 weeks after discharge, ok with a/c.   - ADDIS for workup 10/16/23 demonstrated no thromboembolic source or shunt identified in cardiac chambers. Grade IV atheroma in descending aorta and aortic arch   CV:   - Off pressors, midodrine 20mg daily prn with HD runs   - Goal SBP <160, MAP >65  - Labetalol prn for SBP >160  - Continue statin  - PE+, venous duplex with nonocclusive to occlusive thrombus of the left GSV from the level of the knee to the groin and nonocclusive thrombus of the left distal femoral vein and popliteal veins, increased in extent compared to 9/30/2023.   - Therapeutic Heparin gtt   - ADDIS 10/16/23 demonstrated no thromboembolic source or shunt, has grade IV atheroma in descending aorta and aortic arch   Resp:   - No new acute concerns, minimal vent settings  - Vent settings per SICU, wean as tolerated.   - Plan for extubation today  GI:   - Pancreatitis with peripancreatic fluid collection on CT with question of bleeding into the collection.  - Do not expect this anterior location of pancreatic fluid to be related to surgical procedure as we were in the RP and did see inferior/posterior aspect of pancreas. Fluid collection is not surrounding aortic graft, this is reassuring.  - GI signed off as collection not drainable  - TF resumed, increasing goal, tolerating  - Continue TPN   - Monitor NGT output, was 250ml over the last 24 hrs  FEN:   - Electrolyte replacement prn   Renal:   - Appreciate nephrology recommendations  - Continue iHD  - Tunneled line with IR placed 10/18/2023  Heme:   - Hgb 7.5 this morning, received 1 PRBCs yesterday, will  monitor closely  - DVT as above, PE   - Therapeutic heparin gtt   - PT/OT ROM  ID:   - known aspiration event 10/9   - monitor signs of pneumonia  - nystatin swish for oral candida  - improving leukocytosis, downtrending, monitor for now.   MSK:   - L AKA 10/17/23   Derm:   - Has rash on abdomen, trunk, anterior bilateral thighs, erythematous, blanching, however maculopapular does not seem consistent with cellulitis  - Pharmacy reviewed medications for possible causes of rash and felt all were relatively low risk however not 0 risk   - benadryl cream topically applied with no improvement  - trialed abd binder off for short time to see if improvement (?moisture rash), none seen.   - Appreciate dermatology recs, likely morbilliform eruption vs. Low concern for possible DRESS   - CBC with diff daily    - LFTs daily    - triamcinolone 0.1% twice daily to areas w/ rash    - switched cefepime to levofloxacin  Misc:   - VAC changes with vascular surgery M/W/F: plan for abdominal closure tomorrow   - abd binder on at all times.  - Heparin gtt  - SICU status    Discussed patient with Dr. Lowe, vascular surgery staff    Miryam Carrasco, SACHIN  Vascular Surgery  Pager: [phone]  [email or web address]  Send message or 10 digit call back number Securely via [employer] with the Vocera Web Console (learn more here)

## 2023-10-19 NOTE — PROGRESS NOTES
SURGICAL ICU PROGRESS NOTE  10/19/2023        Date of Service (when I saw the patient): 10/19/2023    ASSESSMENT:  Alfredo Burnham is a 68 year old male who was admitted to the SICU on 9/24/2023 s/p open AAA repair. Patient has a history of HTN and previous TIA. He presented to the ED on 9/24 with right sided abdominal pain and was found to have a contained, ruptured AAA on CT. 30 min super-celiac clamp time. Prolonged intra-renal clamp. 9/25 s/p flex sig showing rectal ischemia, abdominal washout showing a hemostatic AAA graft and no evidence of transmural colonic or small bowel ischemia, and an unsuccessful LLE embolectomy. 9/26 s/p repeat abdominal washout, retroperitoneal closure, LLE wound washout. 9/29 s/p repeat abd washout, bowel appeared well perfused w/o notable ischemia. OR 10/2 for abdominal fascia closure. Extubated 10/4. Unequal pupils and R facial droop, MRI brain showed multiple small infarcts, likely embolic. Patient was transferred out of the surgical ICU on 10/12. On 10/13, patient completed dialysis run and suddenly started experiencing desaturation. Patient was emergently intubated for Acute Hypoxic Respiratory Failure in dialysis unit and transferred to SICU for further cares. AMOS AKNGHIA 10/18 with abd wound vac exchange. Patient admitted to ICU for intubation and sedation management.     CHANGES and MAJOR THINGS TODAY:   - Continue to hold seroquel, atarax, gabapentin, robaxin to assess neuro-exam off sedating medicine  - Wean precedex, RASS goal of 0 to -1  - Patient done well on PST, plan for extubated this AM   - Speak to Derm about + EBV  - increase tube feeds to goal 50 ml/hr, discontinue TPN  -Heparin gtt resumed  -Continue Levaquin for DRESS concern for 1 more dose, discontinue Friday   -discontinue nystatin  -WOC consult  -PT/OT    PLAN:    Neurological:  # Sedation  - Precedex 0.6-0.8 overnight - wean to RASS goal of 0 to -1     # Acute pain  RAPS femoral nerve block 10/18  -  Multi-modal pain control effective (scheduled tylenol, oxycodone PRN, dilaudid PRN).      #Facial droop, left lacunar infarct  #Punctate lesions of cerebellum   - Follow-up with stroke neurology 4-6 weeks after discharge  - ADDIS with no evidence of embolic source     # Delirium  # Mixed metabolic encephalopathy   # Sedation, RASS goal 0 to -1  - Monitor neurological status. Delirium preventions and precautions  - Melatonin q6pm.   - Hold Seroquel and Atarax PRN      Pulmonary:  # Acute hypoxic respiratory failure  # Re-intubation 10/13 (after prev extubation 10/3)  # Pulmonary embolism   Satting well overnight, %.   - Mechanical ventilation  / FiO2 30% / RR 14 / PEEP 5  - Continue PSTs this AM, extubate if he does well.     Cardiovascular:    # Contained AAA rupture s/p open repair 9/25  # Acute critical limb ischaemia of LLE  # Hx of HTN  # Tachycardia  # L iliacus hematoma  # Peripancreatic hematoma   Hgb at 7.5 this AM  - Continue to monitor hemodynamic status; Goal MAP >65, SBP <160  - Heparin gtt restarted  - Continue Lipitor 10 mg     GI/Nutrition:    # s/p open AAA repair, wound vac device in place  # Post-operative melena, resolved  # Rectal ischemia, concern for, resolved  # Aspiration event 10/9  - BR with senna BID and miralax daily  - TPN at 50 ml/hr  - ND tube placed, TF at 20 ml/hr increase to goal, discontinue TPN once at goal  - NGT to LIS  - Monitor electrolytes     Renal/Fluids/Electrolytes:  # Protein calorie deficit malnutrition, risk of   # Acute kidney injury  # Oliguria  # Haemodialysis  #Hyponatremia   - No IVF  - dialysis 10/18  - CVC line placed for dialysis 10/18  - Strict intake and output  - Hyponatremic, likely dilutional. Corrected for hyperglycemia, 132. Will continue to monitor.     Endocrine:  # Stress hyperglycaemia  - High SSI for glucose control  - q6h BG checks     Infectious disease:   # oropharyngeal candidiasis  # Likely aspiration event during ADDIS  WBC up trending  at 15.3. Sputum culture collected, growing 1+ GPCs.  - Cefepime discontinued 10/16 is setting of possible DRESS reaction. Started levaquin 750 loading dose, 500 q48h, last dose friday   - Nystatin suspension for thrush     Hematology:    # Acute blood loss anemia  Hgb 7.5, continue to monitor  - INR 1.32 (10/16)  - Heparin gtt, Xa 0.13      MSK:  # Weakness and deconditioning of critical illness  # Left lower limb ischemia   - Passive ROM at bedside with RN  Marisol WINSLOW completed Tuesday 10/17  - Physical therapy when possible     Skin:  # Diffuse blanching maculopapular rash  Rash present since ~10/13 on abdomen, trunk, thighs. No medications per pharm thought to be the cause. Derm evaluated and felt it was a morbilliform eruption vs less likely DRESS syndrome.   - CBC with differential daily  - LFTs daily  - HHV-6 (-) and EBV (+) discuss with derm    - Continue triamcinolone 0.1% ointment, hydrocortisone cream twice daily to all body areas with rash. Zyrtec 10 mg q12, benadryl TID PRN.   - Monitor closely for progression or change in skin findings.  - Consider peripheral smear to assess for reactive lymphocytes  - Wound on left cheek    General Cares/Prophylaxis:    DVT Prophylaxis: Pneumatic Compression Devices and heparin gtt (held)  GI Prophylaxis: PPI  Restraints: Restraints for medical healing needed: YES     Lines/ tubes/ drains:  - ETT  - Right internal jugular double lumen  - CVC dialysis  - NJT  - PIVs in RUE  - Abdominal wound vac    Disposition:  - Surgical ICU     Patient seen, findings and plan discussed with surgical ICU staff, Dr. Mccall.    Oralia Satfford, MS4  Surgical ICU  ====================================  INTERVAL HISTORY:   Patient is more alert today. He is able to nod his head to yes and no questions. Denies pain, discomfort. Denies shortness of breath. Plan for extubation today. Got tunneled dialysis line with IR, and femoral block with RAPS. Tolerated both procedures well. Discussed with  family updates and plan.    ROS unable to be performed due to sedation.       OBJECTIVE:   1. VITAL SIGNS:   Temp:  [98.2  F (36.8  C)-100.2  F (37.9  C)] 98.3  F (36.8  C)  Pulse:  [] 78  Resp:  [17-30] 22  BP: ()/() 105/67  MAP:  [52 mmHg-117 mmHg] 72 mmHg  Arterial Line BP: ()/(42-83) 105/55  FiO2 (%):  [30 %] 30 %  SpO2:  [97 %-100 %] 100 %  Vent Mode: CMV/AC  (Continuous Mandatory Ventilation/ Assist Control)  FiO2 (%): 30 %  Resp Rate (Set): 14 breaths/min  Tidal Volume (Set, mL): 450 mL  PEEP (cm H2O): 5 cmH2O  Pressure Support (cm H2O): 5 cmH2O  Resp: 22      2. INTAKE/ OUTPUT:   I/O last 3 completed shifts:  In: 2941.12 [I.V.:507.12; NG/GT:500]  Out: 2450 [Emesis/NG output:250; Other:2000; Stool:200]    3. PHYSICAL EXAMINATION:  General: intubated, A&O x3      HEENT: normocephalic,atraumatic  Neuro: opens eyes to command, moves all extremities equally   Pulm/Resp: intubated  CV: normotensive, off pressors  Abdomen: Abdomen mildly distended. Midline laparotomy incision with wound vac in place without output in canister.  abdominal binder in place.  Gu: decreased scrotal swelling.  MSK/Extremities: L AKA wrapped and dressed without strikethrough    4. INVESTIGATIONS:   Arterial Blood Gases   Recent Labs   Lab 10/14/23  0111   PH 7.41  7.41   PCO2 41  41   PO2 85  85   HCO3 26  26     Complete Blood Count   Recent Labs   Lab 10/19/23  0317 10/18/23  0344 10/17/23  1155 10/17/23  0335   WBC 15.1* 14.3* 14.6* 13.2*   HGB 7.5* 6.8* 7.3* 7.5*   * 131* 130* 141*     Basic Metabolic Panel  Recent Labs   Lab 10/19/23  0317 10/19/23  0316 10/18/23  2350 10/18/23  2033 10/18/23  0409 10/18/23  0344 10/17/23  0340 10/17/23  0335 10/16/23  0811 10/16/23  0431   *  --   --   --   --  128*  --  132*  --  131*   POTASSIUM 3.6  --   --   --   --  4.1  --  3.4  --  4.2   CHLORIDE 94*  --   --   --   --  90*  --  94*  --  91*   CO2 18*  --   --   --   --  15*  --  20*  --  15*   BUN  80.7*  --   --   --   --  110.0*  --  74.9*  --  142.0*   CR 3.73*  --   --   --   --  5.66*  --  4.26*  --  7.20*   * 189* 190* 195*   < > 171*   < > 152*   < > 127*  135*    < > = values in this interval not displayed.     Liver Function Tests  Recent Labs   Lab 10/19/23  0317 10/18/23  0344 10/17/23  0335 10/16/23  0431 10/15/23  0353 10/14/23  0349 10/13/23  2130 10/13/23  2034   AST 43 49* 51* 59* 65*   < > 89*  --    ALT 30 32 32 33 34   < > 47  --    ALKPHOS 103 96 87 82 98   < > 115  --    BILITOTAL 0.3 0.3 0.4 0.4 0.4   < > 0.5  --    ALBUMIN 2.4* 2.5* 2.5* 2.5* 2.5*   < > 3.2*  --    INR  --   --   --  1.32* 1.32*  --  1.20* 1.18*    < > = values in this interval not displayed.     Pancreatic Enzymes  Recent Labs   Lab 10/14/23  1017   LIPASE 51     Coagulation Profile  Recent Labs   Lab 10/16/23  0655 10/16/23  0431 10/15/23  0353 10/13/23  2130 10/13/23  2034   INR  --  1.32* 1.32* 1.20* 1.18*   PTT 50*  --  >240* 125* 121*         5. RADIOLOGY:   Recent Results (from the past 24 hour(s))   XR Abdomen Port 1 View    Narrative    Examination:  XR ABDOMEN PORT 1 VIEW 10/18/2023 11:09 AM     Comparison: 10/17/2023    History: Verify small bowel feeding tube bedside placement    Findings: Single AP radiograph of the abdomen. Feeding tube tube  coursing below the left hemidiaphragm with the tip projecting over the  expected region of the distal duodenum. Second enteric tube with tip  and sidehole projecting over the region of the stomach, presumably  NG/OG tube. Stable surgical sutures/clips in the mid left abdomen.  Partially visualized central line tip projecting over the expected  location at the superior vena cava/atrial junction.    The air-filled small intestine and colon are not distended with  relative paucity of bowel gas. There are no abnormal calcifications.  There is no free air. No evidence of pneumatosis. No portal venous  gas. Small left pleural effusion.       Impression     Impression:    1. Feeding tube tip projects over the expected location of distal  duodenum.   2. Enteric tube tip and sidehole projects over the stomach    I have personally reviewed the examination and initial interpretation  and I agree with the findings.    LILLIAN HAYNES MD         SYSTEM ID:  [id number]   POC US Guidance Needle Placement    Impression    Femoral nerve block   IR CVC Tunnel Placement > 5 Yrs of Age    Narrative    PROCEDURES 10/18/2023:  1. Ultrasound guidance for venous access  2. Placement centrally inserted catheter (age > 5 yrs.) tunneled, no  port, no pump  3. Fluoroscopic guidance placement    History: Patient is a 68 year old male with PMH of HTN and previous  TIA who presented with R sided abdominal pain found to have contained  ruptured AAA, now s/p open AAA repair with renal failure, needs  tunneled dialysis catheter for hemodialysis. The patient has right IJ  non tunneled dialysis catheter.    Comparison: Chest radiograph 10/18/2023.    Staff: Dr.Donna Denzel M.D.    Fellow/Resident: Ashkan Behzadi , M.D.    Monitoring: Patient was placed on continuous monitoring with  intravenous conscious sedation administered by the IR nursing staff  and supervised by the IR attending. Patient remained stable throughout  the procedure.     Medications: Running Precedex gtt. 15 cc 1% lidocaine    Sedation time, face-to-face: 39 minutes    Fluoroscopy time: 1.2 minutes    Complications: None    Consent: verbal and written informed consent obtained prior to  procedure.    PROCEDURE: The patient understood the limitations, alternatives, and  risks of the procedure and requested the procedure be performed. Both  written and oral consent were obtained.    Limited jugular ultrasound documented jugular vein patency.    The right neck, indwelling right IJ tunneled dialysis catheter and  upper chest were prepped and draped in the usual sterile fashion. Ten  to one volume mixture of 1% lidocaine without  epinephrine buffered  with 8.4% bicarbonate solution was used for local anesthesia.     Under ultrasound guidance, right internal jugular venotomy was made  with a micropuncture needle. Permanent copy of the image documenting  the vein was entered into the patients record.    Micropuncture needle exchanged over guidewire for the micropuncture  sheath under fluoroscopic guidance. Catheter length measured with the  0.018 guidewire. Micropuncture sheath saline locked. A 19 cm tip to  cuff 14.5 [place]e  dialysis catheter was subcutaneously tunneled from  the right anterior chest to the right internal jugular venotomy site.  Micropuncture sheath exchanged over guidewire for the peel-away  sheath. Guidewire removed. Under fluoroscopic guidance, the catheter  placed through a peel-away sheath and positioned with its tip in the  in the upper right atrium. Both lumens flushed and aspirated  adequately. Each lumen heparin locked with 1000:1 heparin solution.  2-0 nylon catheter retaining suture and sterile dressing applied.  Right internal jugular venotomy site closed with Dermabond.    Then attention directed towards removal of the right internal jugular  non-tunneled dialysis catheter. The indwelling sutures were removed.  The catheter removed and sterile dressing was applied.     No immediate complication.        Impression    IMPRESSION:   1. Ultrasound guided right internal jugular venotomy.  2. 19 cm tip to cuff 14.5 [place]e  tunneled dialysis catheter placed  under fluoroscopic guidance with the tip in the right atrium. Both  lumens heparin locked and ready for use.  3. Uncomplicated removal of indwelling right internal jugular  non-tunneled dialysis catheter.   IR CVC Non Tunnel Line Removal    Narrative    PROCEDURES 10/18/2023:  1. Ultrasound guidance for venous access  2. Placement centrally inserted catheter (age > 5 yrs.) tunneled, no  port, no pump  3. Fluoroscopic guidance placement    History: Patient is a 68  year old male with PMH of HTN and previous  TIA who presented with R sided abdominal pain found to have contained  ruptured AAA, now s/p open AAA repair with renal failure, needs  tunneled dialysis catheter for hemodialysis. The patient has right IJ  non tunneled dialysis catheter.    Comparison: Chest radiograph 10/18/2023.    Staff: Dr.Donna Denzel M.D.    Fellow/Resident: Ashkan Behzadi , M.D.    Monitoring: Patient was placed on continuous monitoring with  intravenous conscious sedation administered by the IR nursing staff  and supervised by the IR attending. Patient remained stable throughout  the procedure.     Medications: Running Precedex gtt. 15 cc 1% lidocaine    Sedation time, face-to-face: 39 minutes    Fluoroscopy time: 1.2 minutes    Complications: None    Consent: verbal and written informed consent obtained prior to  procedure.    PROCEDURE: The patient understood the limitations, alternatives, and  risks of the procedure and requested the procedure be performed. Both  written and oral consent were obtained.    Limited jugular ultrasound documented jugular vein patency.    The right neck, indwelling right IJ tunneled dialysis catheter and  upper chest were prepped and draped in the usual sterile fashion. Ten  to one volume mixture of 1% lidocaine without epinephrine buffered  with 8.4% bicarbonate solution was used for local anesthesia.     Under ultrasound guidance, right internal jugular venotomy was made  with a micropuncture needle. Permanent copy of the image documenting  the vein was entered into the patients record.    Micropuncture needle exchanged over guidewire for the micropuncture  sheath under fluoroscopic guidance. Catheter length measured with the  0.018 guidewire. Micropuncture sheath saline locked. A 19 cm tip to  cuff 14.5 Qat[place]  dialysis catheter was subcutaneously tunneled from  the right anterior chest to the right internal jugular venotomy site.  Micropuncture sheath exchanged  over guidewire for the peel-away  sheath. Guidewire removed. Under fluoroscopic guidance, the catheter  placed through a peel-away sheath and positioned with its tip in the  in the upper right atrium. Both lumens flushed and aspirated  adequately. Each lumen heparin locked with 1000:1 heparin solution.  2-0 nylon catheter retaining suture and sterile dressing applied.  Right internal jugular venotomy site closed with Dermabond.    Then attention directed towards removal of the right internal jugular  non-tunneled dialysis catheter. The indwelling sutures were removed.  The catheter removed and sterile dressing was applied.     No immediate complication.        Impression    IMPRESSION:   1. Ultrasound guided right internal jugular venotomy.  2. 19 cm tip to cuff 14.5 English  tunneled dialysis catheter placed  under fluoroscopic guidance with the tip in the right atrium. Both  lumens heparin locked and ready for use.  3. Uncomplicated removal of indwelling right internal jugular  non-tunneled dialysis catheter.       I saw and evaluated the patient in an independent visit and agree with the student's assessment and plan as written above. I was present at all times for any mentioned procedures and independently assessed the patient. Corrections to the above plan have been made by me as necessary.    Clint Braga MD  PGY-1, Neurosurgery  Off-service on SICU

## 2023-10-20 ENCOUNTER — APPOINTMENT (OUTPATIENT)
Dept: GENERAL RADIOLOGY | Facility: CLINIC | Age: 68
DRG: 268 | End: 2023-10-20
Attending: STUDENT IN AN ORGANIZED HEALTH CARE EDUCATION/TRAINING PROGRAM
Payer: COMMERCIAL

## 2023-10-20 ENCOUNTER — ANESTHESIA EVENT (OUTPATIENT)
Dept: SURGERY | Facility: CLINIC | Age: 68
DRG: 268 | End: 2023-10-20
Payer: COMMERCIAL

## 2023-10-20 ENCOUNTER — ANESTHESIA (OUTPATIENT)
Dept: SURGERY | Facility: CLINIC | Age: 68
DRG: 268 | End: 2023-10-20
Payer: COMMERCIAL

## 2023-10-20 LAB
ALBUMIN SERPL BCG-MCNC: 2.5 G/DL (ref 3.5–5.2)
ALP SERPL-CCNC: 106 U/L (ref 40–129)
ALT SERPL W P-5'-P-CCNC: 34 U/L (ref 0–70)
ANION GAP SERPL CALCULATED.3IONS-SCNC: 19 MMOL/L (ref 7–15)
AST SERPL W P-5'-P-CCNC: 40 U/L (ref 0–45)
BASE EXCESS BLDV CALC-SCNC: -4.8 MMOL/L (ref -7.7–1.9)
BASO+EOS+MONOS # BLD AUTO: ABNORMAL 10*3/UL
BASO+EOS+MONOS NFR BLD AUTO: ABNORMAL %
BASOPHILS # BLD AUTO: ABNORMAL 10*3/UL
BASOPHILS # BLD MANUAL: 0 10E3/UL (ref 0–0.2)
BASOPHILS NFR BLD AUTO: ABNORMAL %
BASOPHILS NFR BLD MANUAL: 0 %
BILIRUB DIRECT SERPL-MCNC: <0.2 MG/DL (ref 0–0.3)
BILIRUB SERPL-MCNC: 0.3 MG/DL
BLAIMP ISLT/SPM QL: NOT DETECTED
BLAKPC ISLT/SPM QL: NOT DETECTED
BLAOXA-48 ISLT/SPM QL: NOT DETECTED
BLAVIM ISLT/SPM QL: NOT DETECTED
BLD PROD TYP BPU: NORMAL
BLOOD COMPONENT TYPE: NORMAL
BUN SERPL-MCNC: 127 MG/DL (ref 8–23)
CA-I BLD-MCNC: 4.5 MG/DL (ref 4.4–5.2)
CALCIUM SERPL-MCNC: 8.5 MG/DL (ref 8.8–10.2)
CHLORIDE SERPL-SCNC: 95 MMOL/L (ref 98–107)
CODING SYSTEM: NORMAL
CREAT SERPL-MCNC: 4.95 MG/DL (ref 0.67–1.17)
CROSSMATCH: NORMAL
DEPRECATED HCO3 PLAS-SCNC: 17 MMOL/L (ref 22–29)
EGFRCR SERPLBLD CKD-EPI 2021: 12 ML/MIN/1.73M2
EOSINOPHIL # BLD AUTO: ABNORMAL 10*3/UL
EOSINOPHIL # BLD MANUAL: 1.4 10E3/UL (ref 0–0.7)
EOSINOPHIL NFR BLD AUTO: ABNORMAL %
EOSINOPHIL NFR BLD MANUAL: 10 %
ERYTHROCYTE [DISTWIDTH] IN BLOOD BY AUTOMATED COUNT: 18.1 % (ref 10–15)
GLUCOSE BLDC GLUCOMTR-MCNC: 141 MG/DL (ref 70–99)
GLUCOSE BLDC GLUCOMTR-MCNC: 142 MG/DL (ref 70–99)
GLUCOSE BLDC GLUCOMTR-MCNC: 152 MG/DL (ref 70–99)
GLUCOSE BLDC GLUCOMTR-MCNC: 153 MG/DL (ref 70–99)
GLUCOSE BLDC GLUCOMTR-MCNC: 156 MG/DL (ref 70–99)
GLUCOSE SERPL-MCNC: 144 MG/DL (ref 70–99)
HCO3 BLDV-SCNC: 19 MMOL/L (ref 21–28)
HCT VFR BLD AUTO: 20.8 % (ref 40–53)
HGB BLD-MCNC: 7 G/DL (ref 13.3–17.7)
HGB BLD-MCNC: 9 G/DL (ref 13.3–17.7)
IMM GRANULOCYTES # BLD: ABNORMAL 10*3/UL
IMM GRANULOCYTES NFR BLD: ABNORMAL %
ISSUE DATE AND TIME: NORMAL
LACTATE SERPL-SCNC: 1.7 MMOL/L (ref 0.7–2)
LYMPHOCYTES # BLD AUTO: ABNORMAL 10*3/UL
LYMPHOCYTES # BLD MANUAL: 0.6 10E3/UL (ref 0.8–5.3)
LYMPHOCYTES NFR BLD AUTO: ABNORMAL %
LYMPHOCYTES NFR BLD MANUAL: 4 %
MAGNESIUM SERPL-MCNC: 2.7 MG/DL (ref 1.7–2.3)
MCH RBC QN AUTO: 29.5 PG (ref 26.5–33)
MCHC RBC AUTO-ENTMCNC: 33.7 G/DL (ref 31.5–36.5)
MCV RBC AUTO: 88 FL (ref 78–100)
METAMYELOCYTES # BLD MANUAL: 0.1 10E3/UL
METAMYELOCYTES NFR BLD MANUAL: 1 %
MONOCYTES # BLD AUTO: ABNORMAL 10*3/UL
MONOCYTES # BLD MANUAL: 0.9 10E3/UL (ref 0–1.3)
MONOCYTES NFR BLD AUTO: ABNORMAL %
MONOCYTES NFR BLD MANUAL: 6 %
NDM TARGET DNA: NOT DETECTED
NEUTROPHILS # BLD AUTO: ABNORMAL 10*3/UL
NEUTROPHILS # BLD MANUAL: 11.1 10E3/UL (ref 1.6–8.3)
NEUTROPHILS NFR BLD AUTO: ABNORMAL %
NEUTROPHILS NFR BLD MANUAL: 78 %
NRBC # BLD AUTO: 0 10E3/UL
NRBC BLD AUTO-RTO: 0 /100
O2/TOTAL GAS SETTING VFR VENT: 21 %
OXYHGB MFR BLDV: 71 % (ref 70–75)
PCO2 BLDV: 31 MM HG (ref 40–50)
PH BLDV: 7.4 [PH] (ref 7.32–7.43)
PHOSPHATE SERPL-MCNC: 3.5 MG/DL (ref 2.5–4.5)
PLAT MORPH BLD: ABNORMAL
PLATELET # BLD AUTO: 147 10E3/UL (ref 150–450)
PO2 BLDV: 38 MM HG (ref 25–47)
POLYCHROMASIA BLD QL SMEAR: SLIGHT
POTASSIUM SERPL-SCNC: 3.9 MMOL/L (ref 3.4–5.3)
PROMYELOCYTES # BLD MANUAL: 0.1 10E3/UL
PROMYELOCYTES NFR BLD MANUAL: 1 %
PROT SERPL-MCNC: 5.7 G/DL (ref 6.4–8.3)
RBC # BLD AUTO: 2.37 10E6/UL (ref 4.4–5.9)
RBC MORPH BLD: ABNORMAL
SODIUM SERPL-SCNC: 131 MMOL/L (ref 135–145)
UFH PPP CHRO-ACNC: 0.55 IU/ML
UFH PPP CHRO-ACNC: 0.6 IU/ML
UNIT ABO/RH: NORMAL
UNIT NUMBER: NORMAL
UNIT STATUS: NORMAL
UNIT TYPE ISBT: 5100
WBC # BLD AUTO: 14.2 10E3/UL (ref 4–11)

## 2023-10-20 PROCEDURE — 272N000001 HC OR GENERAL SUPPLY STERILE: Performed by: SURGERY

## 2023-10-20 PROCEDURE — 250N000011 HC RX IP 250 OP 636: Mod: JZ

## 2023-10-20 PROCEDURE — 85520 HEPARIN ASSAY: CPT | Performed by: SURGERY

## 2023-10-20 PROCEDURE — 258N000003 HC RX IP 258 OP 636: Performed by: CLINICAL NURSE SPECIALIST

## 2023-10-20 PROCEDURE — 250N000013 HC RX MED GY IP 250 OP 250 PS 637

## 2023-10-20 PROCEDURE — 999N000157 HC STATISTIC RCP TIME EA 10 MIN

## 2023-10-20 PROCEDURE — 250N000009 HC RX 250: Performed by: STUDENT IN AN ORGANIZED HEALTH CARE EDUCATION/TRAINING PROGRAM

## 2023-10-20 PROCEDURE — 82805 BLOOD GASES W/O2 SATURATION: CPT | Performed by: STUDENT IN AN ORGANIZED HEALTH CARE EDUCATION/TRAINING PROGRAM

## 2023-10-20 PROCEDURE — 250N000013 HC RX MED GY IP 250 OP 250 PS 637: Performed by: SURGERY

## 2023-10-20 PROCEDURE — 83735 ASSAY OF MAGNESIUM: CPT | Performed by: PHYSICIAN ASSISTANT

## 2023-10-20 PROCEDURE — 85018 HEMOGLOBIN: CPT

## 2023-10-20 PROCEDURE — P9016 RBC LEUKOCYTES REDUCED: HCPCS

## 2023-10-20 PROCEDURE — 83605 ASSAY OF LACTIC ACID: CPT | Performed by: PHYSICIAN ASSISTANT

## 2023-10-20 PROCEDURE — 85007 BL SMEAR W/DIFF WBC COUNT: CPT

## 2023-10-20 PROCEDURE — 360N000076 HC SURGERY LEVEL 3, PER MIN: Performed by: SURGERY

## 2023-10-20 PROCEDURE — 90937 HEMODIALYSIS REPEATED EVAL: CPT

## 2023-10-20 PROCEDURE — 999N000065 XR ABDOMEN PORT 1 VIEW

## 2023-10-20 PROCEDURE — 85027 COMPLETE CBC AUTOMATED: CPT

## 2023-10-20 PROCEDURE — 250N000025 HC SEVOFLURANE, PER MIN: Performed by: SURGERY

## 2023-10-20 PROCEDURE — 999N000253 HC STATISTIC WEANING TRIALS

## 2023-10-20 PROCEDURE — 258N000003 HC RX IP 258 OP 636: Performed by: STUDENT IN AN ORGANIZED HEALTH CARE EDUCATION/TRAINING PROGRAM

## 2023-10-20 PROCEDURE — 82330 ASSAY OF CALCIUM: CPT | Performed by: PHYSICIAN ASSISTANT

## 2023-10-20 PROCEDURE — 634N000001 HC RX 634: Mod: JZ | Performed by: SURGERY

## 2023-10-20 PROCEDURE — 250N000011 HC RX IP 250 OP 636: Performed by: STUDENT IN AN ORGANIZED HEALTH CARE EDUCATION/TRAINING PROGRAM

## 2023-10-20 PROCEDURE — 87798 DETECT AGENT NOS DNA AMP: CPT | Performed by: INTERNAL MEDICINE

## 2023-10-20 PROCEDURE — 94002 VENT MGMT INPAT INIT DAY: CPT

## 2023-10-20 PROCEDURE — 0WQF0ZZ REPAIR ABDOMINAL WALL, OPEN APPROACH: ICD-10-PCS | Performed by: SURGERY

## 2023-10-20 PROCEDURE — 999N000259 HC STATISTIC EXTUBATION

## 2023-10-20 PROCEDURE — 370N000017 HC ANESTHESIA TECHNICAL FEE, PER MIN: Performed by: SURGERY

## 2023-10-20 PROCEDURE — 80076 HEPATIC FUNCTION PANEL: CPT

## 2023-10-20 PROCEDURE — 250N000011 HC RX IP 250 OP 636: Performed by: NURSE PRACTITIONER

## 2023-10-20 PROCEDURE — 200N000002 HC R&B ICU UMMC

## 2023-10-20 PROCEDURE — 250N000013 HC RX MED GY IP 250 OP 250 PS 637: Performed by: PHARMACIST

## 2023-10-20 PROCEDURE — 99233 SBSQ HOSP IP/OBS HIGH 50: CPT | Performed by: INTERNAL MEDICINE

## 2023-10-20 PROCEDURE — 84100 ASSAY OF PHOSPHORUS: CPT | Performed by: PHYSICIAN ASSISTANT

## 2023-10-20 PROCEDURE — 74018 RADEX ABDOMEN 1 VIEW: CPT | Mod: 26 | Performed by: RADIOLOGY

## 2023-10-20 PROCEDURE — 99291 CRITICAL CARE FIRST HOUR: CPT | Mod: GC | Performed by: SURGERY

## 2023-10-20 RX ORDER — HEPARIN SODIUM 10000 [USP'U]/100ML
0-5000 INJECTION, SOLUTION INTRAVENOUS CONTINUOUS
Status: DISCONTINUED | OUTPATIENT
Start: 2023-10-20 | End: 2023-10-20

## 2023-10-20 RX ORDER — CLINDAMYCIN PHOSPHATE 900 MG/50ML
INJECTION, SOLUTION INTRAVENOUS PRN
Status: DISCONTINUED | OUTPATIENT
Start: 2023-10-20 | End: 2023-10-20

## 2023-10-20 RX ORDER — ONDANSETRON 2 MG/ML
INJECTION INTRAMUSCULAR; INTRAVENOUS PRN
Status: DISCONTINUED | OUTPATIENT
Start: 2023-10-20 | End: 2023-10-20

## 2023-10-20 RX ORDER — FENTANYL CITRATE 50 UG/ML
INJECTION, SOLUTION INTRAMUSCULAR; INTRAVENOUS PRN
Status: DISCONTINUED | OUTPATIENT
Start: 2023-10-20 | End: 2023-10-20

## 2023-10-20 RX ORDER — SODIUM CHLORIDE, SODIUM LACTATE, POTASSIUM CHLORIDE, CALCIUM CHLORIDE 600; 310; 30; 20 MG/100ML; MG/100ML; MG/100ML; MG/100ML
INJECTION, SOLUTION INTRAVENOUS CONTINUOUS PRN
Status: DISCONTINUED | OUTPATIENT
Start: 2023-10-20 | End: 2023-10-20

## 2023-10-20 RX ORDER — PROPOFOL 10 MG/ML
INJECTION, EMULSION INTRAVENOUS CONTINUOUS PRN
Status: DISCONTINUED | OUTPATIENT
Start: 2023-10-20 | End: 2023-10-20

## 2023-10-20 RX ORDER — PROPOFOL 10 MG/ML
INJECTION, EMULSION INTRAVENOUS PRN
Status: DISCONTINUED | OUTPATIENT
Start: 2023-10-20 | End: 2023-10-20

## 2023-10-20 RX ORDER — LIDOCAINE 40 MG/G
CREAM TOPICAL
Status: CANCELLED | OUTPATIENT
Start: 2023-10-20

## 2023-10-20 RX ORDER — LIDOCAINE HYDROCHLORIDE 20 MG/ML
INJECTION, SOLUTION INFILTRATION; PERINEURAL PRN
Status: DISCONTINUED | OUTPATIENT
Start: 2023-10-20 | End: 2023-10-20

## 2023-10-20 RX ORDER — SODIUM CHLORIDE, SODIUM LACTATE, POTASSIUM CHLORIDE, CALCIUM CHLORIDE 600; 310; 30; 20 MG/100ML; MG/100ML; MG/100ML; MG/100ML
INJECTION, SOLUTION INTRAVENOUS CONTINUOUS
Status: CANCELLED | OUTPATIENT
Start: 2023-10-20

## 2023-10-20 RX ORDER — HEPARIN SODIUM 10000 [USP'U]/100ML
0-5000 INJECTION, SOLUTION INTRAVENOUS CONTINUOUS
Status: DISCONTINUED | OUTPATIENT
Start: 2023-10-20 | End: 2023-10-26

## 2023-10-20 RX ADMIN — INSULIN ASPART 1 UNITS: 100 INJECTION, SOLUTION INTRAVENOUS; SUBCUTANEOUS at 04:20

## 2023-10-20 RX ADMIN — LABETALOL HYDROCHLORIDE 20 MG: 5 INJECTION, SOLUTION INTRAVENOUS at 10:56

## 2023-10-20 RX ADMIN — PHENYLEPHRINE HYDROCHLORIDE 200 MCG: 10 INJECTION INTRAVENOUS at 08:26

## 2023-10-20 RX ADMIN — PHENYLEPHRINE HYDROCHLORIDE 100 MCG: 10 INJECTION INTRAVENOUS at 08:55

## 2023-10-20 RX ADMIN — LIDOCAINE HYDROCHLORIDE 100 MG: 20 INJECTION, SOLUTION INFILTRATION; PERINEURAL at 08:10

## 2023-10-20 RX ADMIN — PHENYLEPHRINE HYDROCHLORIDE 100 MCG: 10 INJECTION INTRAVENOUS at 08:51

## 2023-10-20 RX ADMIN — PHENYLEPHRINE HYDROCHLORIDE 100 MCG: 10 INJECTION INTRAVENOUS at 09:01

## 2023-10-20 RX ADMIN — INSULIN ASPART 1 UNITS: 100 INJECTION, SOLUTION INTRAVENOUS; SUBCUTANEOUS at 13:00

## 2023-10-20 RX ADMIN — PROPOFOL 40 MCG/KG/MIN: 10 INJECTION, EMULSION INTRAVENOUS at 09:45

## 2023-10-20 RX ADMIN — SODIUM CHLORIDE 250 ML: 9 INJECTION, SOLUTION INTRAVENOUS at 11:45

## 2023-10-20 RX ADMIN — HEPARIN SODIUM 2050 UNITS/HR: 10000 INJECTION, SOLUTION INTRAVENOUS at 15:35

## 2023-10-20 RX ADMIN — ONDANSETRON 4 MG: 2 INJECTION INTRAMUSCULAR; INTRAVENOUS at 08:59

## 2023-10-20 RX ADMIN — PHENYLEPHRINE HYDROCHLORIDE 200 MCG: 10 INJECTION INTRAVENOUS at 08:23

## 2023-10-20 RX ADMIN — HYDROCORTISONE: 25 CREAM TOPICAL at 21:20

## 2023-10-20 RX ADMIN — INSULIN ASPART 1 UNITS: 100 INJECTION, SOLUTION INTRAVENOUS; SUBCUTANEOUS at 00:51

## 2023-10-20 RX ADMIN — OXYCODONE HYDROCHLORIDE 5 MG: 5 TABLET ORAL at 19:40

## 2023-10-20 RX ADMIN — TRIAMCINOLONE ACETONIDE: 1 OINTMENT TOPICAL at 21:20

## 2023-10-20 RX ADMIN — HYDROMORPHONE HYDROCHLORIDE 0.3 MG: 1 INJECTION, SOLUTION INTRAMUSCULAR; INTRAVENOUS; SUBCUTANEOUS at 15:21

## 2023-10-20 RX ADMIN — ACETAMINOPHEN 975 MG: 325 TABLET, FILM COATED ORAL at 21:19

## 2023-10-20 RX ADMIN — EPOETIN ALFA-EPBX 4000 UNITS: 10000 INJECTION, SOLUTION INTRAVENOUS; SUBCUTANEOUS at 13:12

## 2023-10-20 RX ADMIN — PHENYLEPHRINE HYDROCHLORIDE 100 MCG: 10 INJECTION INTRAVENOUS at 08:44

## 2023-10-20 RX ADMIN — ATORVASTATIN CALCIUM 10 MG: 10 TABLET, FILM COATED ORAL at 19:40

## 2023-10-20 RX ADMIN — QUETIAPINE FUMARATE 50 MG: 50 TABLET ORAL at 21:19

## 2023-10-20 RX ADMIN — CLINDAMYCIN PHOSPHATE 900 MG: 900 INJECTION, SOLUTION INTRAVENOUS at 08:36

## 2023-10-20 RX ADMIN — PHENYLEPHRINE HYDROCHLORIDE 300 MCG: 10 INJECTION INTRAVENOUS at 08:18

## 2023-10-20 RX ADMIN — PROPOFOL 80 MG: 10 INJECTION, EMULSION INTRAVENOUS at 08:10

## 2023-10-20 RX ADMIN — PHENYLEPHRINE HYDROCHLORIDE 200 MCG: 10 INJECTION INTRAVENOUS at 08:11

## 2023-10-20 RX ADMIN — SODIUM CHLORIDE, POTASSIUM CHLORIDE, SODIUM LACTATE AND CALCIUM CHLORIDE: 600; 310; 30; 20 INJECTION, SOLUTION INTRAVENOUS at 08:01

## 2023-10-20 RX ADMIN — INSULIN ASPART 1 UNITS: 100 INJECTION, SOLUTION INTRAVENOUS; SUBCUTANEOUS at 16:44

## 2023-10-20 RX ADMIN — FENTANYL CITRATE 50 MCG: 50 INJECTION INTRAMUSCULAR; INTRAVENOUS at 09:47

## 2023-10-20 RX ADMIN — PHENYLEPHRINE HYDROCHLORIDE 100 MCG: 10 INJECTION INTRAVENOUS at 08:40

## 2023-10-20 RX ADMIN — FENTANYL CITRATE 250 MCG: 50 INJECTION INTRAMUSCULAR; INTRAVENOUS at 08:10

## 2023-10-20 RX ADMIN — Medication 50 MG: at 08:10

## 2023-10-20 RX ADMIN — PHENYLEPHRINE HYDROCHLORIDE 100 MCG: 10 INJECTION INTRAVENOUS at 08:21

## 2023-10-20 RX ADMIN — PHENYLEPHRINE HYDROCHLORIDE 100 MCG: 10 INJECTION INTRAVENOUS at 08:57

## 2023-10-20 RX ADMIN — ACETAMINOPHEN 975 MG: 325 TABLET, FILM COATED ORAL at 03:22

## 2023-10-20 RX ADMIN — PHENYLEPHRINE HYDROCHLORIDE 100 MCG: 10 INJECTION INTRAVENOUS at 08:42

## 2023-10-20 RX ADMIN — ACETAMINOPHEN 975 MG: 325 TABLET, FILM COATED ORAL at 15:31

## 2023-10-20 RX ADMIN — INSULIN ASPART 1 UNITS: 100 INJECTION, SOLUTION INTRAVENOUS; SUBCUTANEOUS at 21:20

## 2023-10-20 RX ADMIN — SODIUM CHLORIDE 300 ML: 9 INJECTION, SOLUTION INTRAVENOUS at 11:45

## 2023-10-20 RX ADMIN — PHENYLEPHRINE HYDROCHLORIDE 100 MCG: 10 INJECTION INTRAVENOUS at 08:53

## 2023-10-20 RX ADMIN — HEPARIN SODIUM 2050 UNITS/HR: 10000 INJECTION, SOLUTION INTRAVENOUS at 04:45

## 2023-10-20 RX ADMIN — Medication 1 MG: at 18:44

## 2023-10-20 RX ADMIN — FENTANYL CITRATE 50 MCG: 50 INJECTION INTRAMUSCULAR; INTRAVENOUS at 09:51

## 2023-10-20 RX ADMIN — LEVOFLOXACIN 500 MG: 5 INJECTION, SOLUTION INTRAVENOUS at 16:32

## 2023-10-20 ASSESSMENT — ACTIVITIES OF DAILY LIVING (ADL)
ADLS_ACUITY_SCORE: 43
ADLS_ACUITY_SCORE: 51
ADLS_ACUITY_SCORE: 51
ADLS_ACUITY_SCORE: 47
ADLS_ACUITY_SCORE: 47
ADLS_ACUITY_SCORE: 51
ADLS_ACUITY_SCORE: 47
ADLS_ACUITY_SCORE: 43
ADLS_ACUITY_SCORE: 47
ADLS_ACUITY_SCORE: 51
ADLS_ACUITY_SCORE: 51
ADLS_ACUITY_SCORE: 47

## 2023-10-20 ASSESSMENT — LIFESTYLE VARIABLES: TOBACCO_USE: 1

## 2023-10-20 NOTE — OR NURSING
Informed Dr. Rodriguez that hep gtt is still running, sincde the OR time has been moved up by 4-5 hours.. Dr. Rodriguez is OK with that.

## 2023-10-20 NOTE — PLAN OF CARE
D/I: Pt down for abdominal closure this am. Extubated this afternoon after dialysis. Sats 98% on RA. Oriented to place, self, follows commands. Blood pressure within parameters. Anuric. Stool via rectal tube. Tube feeds at goal. Heparin gtt at 2050 units/hr. Hgb recheck 9.0  A: Pt abdomen closed, extubate on RA. Vital signs stable.   P: Continue with current plan of care. Update Md with concerns.

## 2023-10-20 NOTE — PROGRESS NOTES
Nephrology Progress Note  10/20/2023       Alfredo Burnham is a 68 yom with complex hx of TIA, HTN, blindness who presented to [facility] 9/24 with severe abdominal pain radiating to flank and back, CT revealed AAA with a contained rupture.  Taken to OR for aortobiiliac bypass and resection of ruptured pararenal aneurysm.   Post op course complicated by hypotension requiring pressors and metabolic acidosis.  Baseline Cr 1.0 on presentation but on the rise to >5 at time of renal consult for MAYA management and possible RRT.       Interval History :   Mr Burnham had CRRT stopped 10/4, generally has been stable on iHD since then but had resp arrest immediately post HD run on 10/13 requiring return to ICU and intubation.  Extubated again yesterday Getting a 4h run today, will pull 2L to essentially match intake.      Assessment & Recommendations:   MAYA-Baseline Cr 1.0, ordered UA.  CT showed benign cyst but otherwise normal kidneys.  Cr on the rise since surgery, did receive contrast for CTA.  Urine microscopy showed granular casts suggesting ATN which will recover with time and stabilizing hemodynamics.  Started on CRRT 9/30 for volume and clearance with minimal UOP and rising Cr.                  -Started CRRT 9/30 for volume and clearance, stopped 10/4 and now running iHD PRN (generally on MWF) and watching for recovery.  Infarcts seen on CT 10/13 lowers chances of recovery.       -Appreciate IR placing tunneled line RIJ 10/18.                  -Dialysis consent signed and scanned into media tab.      Volume- BP's reasonable overnight and has some edema so will plan to pull 2-3L to account for gains the past ~48h.      Electrolytes-K 4.0->3.9, bicarb 18->17 with high AG/negative lactate.       BMD-Ca 8.4->8.5, Mg 2.0->2.7.  Phos noted to be high at 3.2->3.5 no need for phosphate binders at this time  Anemia-Hgb 7.5->9, ~14 on presentation, acute management per team.       Nutrition-On TPN, no phos in TPN formula.      "  Time spent: 40 minutes on this date of encounter for chart review, physical exam, medical decision making and co-ordination of care.       Recommendations were communicated to primary team via verbal communication.      Kori Mccauley MD  Division of Nephrology and Hypertension  [phone]    Review of Systems:   I reviewed the following systems:  ROS not done due to lethargy    Physical Exam:   I/O last 3 completed shifts:  In: 2058.16 [I.V.:686.56; NG/GT:450]  Out: 1050 [Urine:250; Emesis/NG output:600; Stool:200]   BP (!) 147/75   Pulse 112   Temp 98.7  F (37.1  C) (Axillary)   Resp 25   Ht 1.727 m (5' 8\")   Wt 81 kg (178 lb 9.2 oz)   SpO2 100%   BMI 27.15 kg/m       GENERAL APPEARANCE: Extubated, lethargic, in no distress.   EYES: No scleral icterus  Pulmonary: On vent 40%/8 of PEEP  CV: Regular rhythm, normal rate   - Edema +1-2 generalized, + scrotal edema.    GI: distended, nontender  MS: no evidence of inflammation in joints, no muscle tenderness  : No Lu  SKIN: no rash, warm, dry  NEURO: No focal deficits.         Labs:   All labs reviewed by me  Electrolytes/Renal -   Recent Labs   Lab Test 10/20/23  0408 10/20/23  0050 10/19/23  0841 10/19/23  0317 10/18/23  0409 10/18/23  0344   *  --   --  130*  --  128*   POTASSIUM 3.9  --   --  3.6  --  4.1   CHLORIDE 95*  --   --  94*  --  90*   CO2 17*  --   --  18*  --  15*   .0*  --   --  80.7*  --  110.0*   CR 4.95*  --   --  3.73*  --  5.66*   *  144* 142*   < > 204*   < > 171*   OMAR 8.5*  --   --  8.4*  --  8.0*   MAG 2.7*  --   --  2.0  --  2.5*   PHOS 3.5  --   --  3.2  --  5.0*    < > = values in this interval not displayed.       CBC -   Recent Labs   Lab Test 10/20/23  0408 10/19/23  0317 10/18/23  0344   WBC 14.2* 15.1* 14.3*   HGB 7.0* 7.5* 6.8*   * 132* 131*       LFTs -   Recent Labs   Lab Test 10/20/23  0408 10/19/23  0317 10/18/23  0344   ALKPHOS 106 103 96   BILITOTAL 0.3 0.3 0.3   ALT 34 30 32   AST 40 43 " 49*   PROTTOTAL 5.7* 5.4* 5.6*   ALBUMIN 2.5* 2.4* 2.5*       Iron Panel - No lab results found.        Current Medications:   [Auto Hold] acetaminophen  975 mg Oral or Feeding Tube Q6H    [Auto Hold] atorvastatin  10 mg Oral or Feeding Tube QPM    [Auto Hold] epoetin viviana-epbx  4,000 Units Intravenous Once in dialysis/CRRT    [Auto Hold] hydrocortisone   Topical BID    [Auto Hold] insulin aspart  1-12 Units Subcutaneous Q4H    [Auto Hold] levofloxacin  500 mg Intravenous Q48H    [Auto Hold] melatonin  1 mg Oral or Feeding Tube QPM    [Auto Hold] multivitamins w/minerals  15 mL Per Feeding Tube Daily    [Auto Hold] - MEDICATION INSTRUCTIONS -   Does not apply Once    [Held by provider] polyethylene glycol  17 g Oral or Feeding Tube Daily    [Auto Hold] protein modular  1 packet Per Feeding Tube TID    [Auto Hold] QUEtiapine  50 mg Oral At Bedtime    [Held by provider] senna-docusate  1 tablet Oral or Feeding Tube BID    [Auto Hold] sodium chloride (PF)  9 mL Intracatheter During Dialysis/CRRT (from stock)    [Auto Hold] sodium chloride (PF)  9 mL Intracatheter During Dialysis/CRRT (from stock)    [Auto Hold] sodium chloride 0.9%  250 mL Intravenous Once in dialysis/CRRT    [Auto Hold] sodium chloride 0.9%  300 mL Hemodialysis Machine Once    [Auto Hold] triamcinolone   Topical BID      dexmedeTOMIDine Stopped (10/19/23 1430)    dextrose      dextrose

## 2023-10-20 NOTE — PROGRESS NOTES
SURGICAL ICU PROGRESS NOTE  10/20/2023        Date of Service (when I saw the patient): 10/20/2023    ASSESSMENT:  Alfredo Burnham is a 68 year old male who was admitted to the SICU on 9/24/2023 s/p open AAA repair. Patient has a history of HTN and previous TIA. He presented to the ED on 9/24 with right sided abdominal pain and was found to have a contained, ruptured AAA on CT. 30 min super-celiac clamp time. Prolonged intra-renal clamp. 9/25 s/p flex sig showing rectal ischemia, abdominal washout showing a hemostatic AAA graft and no evidence of transmural colonic or small bowel ischemia, and an unsuccessful LLE embolectomy. 9/26 s/p repeat abdominal washout, retroperitoneal closure, LLE wound washout. 9/29 s/p repeat abd washout, bowel appeared well perfused w/o notable ischemia. OR 10/2 for abdominal fascia closure. Extubated 10/4. Unequal pupils and R facial droop, MRI brain showed multiple small infarcts, likely embolic. Patient was transferred out of the surgical ICU on 10/12. On 10/13, patient completed dialysis run and suddenly started experiencing desaturation. Patient was emergently intubated for Acute Hypoxic Respiratory Failure in dialysis unit and transferred to SICU for further cares. L AKA 10/18 with abd wound vac exchange. Patient admitted to ICU for intubation and sedation management.     CHANGES and MAJOR THINGS TODAY:   -Transfuse 1 unit(s) pRBC given low Hb  -Seroquel 50 mg and precedex gtt at bedtime for delirium  -RTOR for abdominal wound closure; discontinue propofol and PST, plan for extubation   -Labetalol for SBP>160    PLAN:    Neurological:  # Sedation  RTOR 10/20 pt returned on propofol 40, plan to wean   - Will continue precedex only at bedtime for delirium    # Acute pain  RAPS femoral nerve block 10/18  - Multi-modal pain control effective (scheduled tylenol, oxycodone PRN, dilaudid PRN, robaxin 500 mg TID PRN).      #Facial droop, left lacunar infarct  #Punctate lesions of  cerebellum   - Follow-up with stroke neurology 4-6 weeks after discharge  - ADDIS with no evidence of embolic source     # Delirium, ongoing  # Mixed metabolic encephalopathy   # Sedation, RASS goal 0 to -1  - Monitor neurological status. Delirium preventions and precautions  - Melatonin q6pm.   - Seroquel 50 mg at bedtime, precedex overnight   - holding atarax    Pulmonary:  # Acute hypoxic respiratory failure  # Re-intubation 10/13 (after prev extubation 10/3)  # Pulmonary embolism   Satting well overnight, %.   -Extubated 10/18. RTOR 10/20 and reintubated CMV RR 14, , FiO2 40, PEEP 5  -PST to extubation this PM    Cardiovascular:    # Contained AAA rupture s/p open repair 9/25  # Acute critical limb ischaemia of LLE  # Hx of HTN  # Tachycardia  # L iliacus hematoma  # Peripancreatic hematoma   - Hgb at 7.0 this AM, s/p 1 U PRBCs  - Recheck Hb in evening  - Continue to monitor hemodynamic status; Goal MAP >65, SBP <160  - Heparin gtt continuing  - Continue Lipitor 10 mg     GI/Nutrition:    # s/p open AAA repair, wound vac device in place  # Post-operative melena, resolved  # Rectal ischemia, concern for, resolved  # Aspiration event 10/9  - BR with senna BID and miralax daily  - TPN discontinued yesterday    - ND tube placed, TF at goal   - NGT to LIS  - Monitor electrolytes     Renal/Fluids/Electrolytes:  # Protein calorie deficit malnutrition, risk of   # Acute kidney injury  # Oliguria  # Haemodialysis  #Hyponatremia   - No IVF  - HD per nephrology, w/ tunneled dialysis line  - Strict intake and output  - Hyponatremic, likely dilutional. Corrected for hyperglycemia, 132. Will continue to monitor.     Endocrine:  # Stress hyperglycaemia  - High SSI for glucose control  - q6h BG checks     Infectious disease:   # oropharyngeal candidiasis  # Likely aspiration event during ADDIS  WBC up trending at 15.3. Sputum culture collected, growing 1+ GPCs.  - Cefepime discontinued 10/16 is setting of possible DRESS  reaction. Started levaquin 750 loading dose, 500 q48h, last dose 10/20     Hematology:    # Acute blood loss anemia  - Hb 7, received 1 U PRBCs  - Repeat Hb check at 2PM  - Heparin gtt continued  MSK:  # Weakness and deconditioning of critical illness  # Left lower limb ischemia   - Passive ROM at bedside with RN  Marisol WINSLOW completed Tuesday 10/17  - Physical therapy when possible     Skin:  # Diffuse blanching maculopapular rash  Rash present since ~10/13 on abdomen, trunk, thighs. No medications per pharm thought to be the cause. Derm evaluated and felt it was a morbilliform eruption vs less likely DRESS syndrome.   - CBC with differential daily  - LFTs daily  - HHV-6 (-) and EBV (+), no indication for further management at this time   - Continue triamcinolone 0.1% ointment, hydrocortisone cream twice daily to all body areas with rash. Zyrtec 10 mg q12, benadryl TID PRN.   - Monitor closely for progression or change in skin findings.  - Consider peripheral smear to assess for reactive lymphocytes  - Wound on left cheek - seen by [facility]    General Cares/Prophylaxis:    DVT Prophylaxis: Pneumatic Compression Devices and heparin gtt (held)  GI Prophylaxis: PPI  Restraints: Restraints for medical healing needed: YES     Lines/ tubes/ drains:  -R tunneled HD line  -L subclavian triple lumen  -NDT  -NGT  -PIVs in RUE  -Abdominal wound vac    Disposition:  - Surgical ICU     Patient seen, findings and plan discussed with surgical ICU staff, Dr. Mccall.    Oralia Stafford, MS4  Surgical ICU  ====================================  INTERVAL HISTORY:   Patient is less alert today. He can answer some questions but is unsure who the current president is. His speech is gargled and quiet. Denies pain, discomfort. Denies shortness of breath. Plan for RTOR today for abdominal wound closure. Tranfuse 1 unit(s) pRBC.     ROS unable to be performed due to sedation.       OBJECTIVE:   1. VITAL SIGNS:   Temp:  [97.8  F (36.6  C)-98.9  F  (37.2  C)] 98.6  F (37  C)  Pulse:  [] 113  Resp:  [10-27] 20  BP: ()/(64-96) 147/75  MAP:  [66 mmHg-110 mmHg] 87 mmHg  Arterial Line BP: (100-174)/(51-83) 168/58  FiO2 (%):  [30 %] 30 %  SpO2:  [96 %-100 %] 100 %  Vent Mode: (S) CPAP/PS  (Continuous positive airway pressure with Pressure Support)  FiO2 (%): 30 %  Resp Rate (Set): 14 breaths/min  Tidal Volume (Set, mL): 450 mL  PEEP (cm H2O): 5 cmH2O  Pressure Support (cm H2O): 5 cmH2O  Resp: 20      2. INTAKE/ OUTPUT:   I/O last 3 completed shifts:  In: 2420.54 [I.V.:752.54; NG/GT:420]  Out: 800 [Urine:250; Emesis/NG output:400; Stool:150]    3. PHYSICAL EXAMINATION:  General: Comfortable on RA. Speech is soft and gargled.    HEENT: normocephalic,atraumatic  Neuro: opens eyes to command, moves all extremities equally   Pulm/Resp: Equal chest rise, no respiratory distress  CV: hypertensive, off pressors  Abdomen: Abdomen mildly distended. Midline laparotomy incision with wound vac in place without output in canister.  abdominal binder in place.  Gu: decreased scrotal swelling.  MSK/Extremities: L AKA wrapped and dressed without strikethrough    4. INVESTIGATIONS:   Arterial Blood Gases   Recent Labs   Lab 10/14/23  0111   PH 7.41  7.41   PCO2 41  41   PO2 85  85   HCO3 26  26     Complete Blood Count   Recent Labs   Lab 10/20/23  0408 10/19/23  0317 10/18/23  0344 10/17/23  1155   WBC 14.2* 15.1* 14.3* 14.6*   HGB 7.0* 7.5* 6.8* 7.3*   * 132* 131* 130*     Basic Metabolic Panel  Recent Labs   Lab 10/20/23  0408 10/20/23  0050 10/19/23  2235 10/19/23  2033 10/19/23  0841 10/19/23  0317 10/18/23  0409 10/18/23  0344 10/17/23  0340 10/17/23  0335 10/16/23  0811 10/16/23  0431   NA  --   --   --   --   --  130*  --  128*  --  132*  --  131*   POTASSIUM  --   --   --   --   --  3.6  --  4.1  --  3.4  --  4.2   CHLORIDE  --   --   --   --   --  94*  --  90*  --  94*  --  91*   CO2  --   --   --   --   --  18*  --  15*  --  20*  --  15*   BUN  --    --   --   --   --  80.7*  --  110.0*  --  74.9*  --  142.0*   CR  --   --   --   --   --  3.73*  --  5.66*  --  4.26*  --  7.20*   * 142* 143* 175*   < > 204*   < > 171*   < > 152*   < > 127*  135*    < > = values in this interval not displayed.     Liver Function Tests  Recent Labs   Lab 10/20/23  0408 10/19/23  0317 10/18/23  0344 10/17/23  0335 10/16/23  0431 10/15/23  0353 10/14/23  0349 10/13/23  2130 10/13/23  2034   AST 40 43 49* 51* 59* 65*   < > 89*  --    ALT 34 30 32 32 33 34   < > 47  --    ALKPHOS 106 103 96 87 82 98   < > 115  --    BILITOTAL 0.3 0.3 0.3 0.4 0.4 0.4   < > 0.5  --    ALBUMIN 2.5* 2.4* 2.5* 2.5* 2.5* 2.5*   < > 3.2*  --    INR  --   --   --   --  1.32* 1.32*  --  1.20* 1.18*    < > = values in this interval not displayed.     Pancreatic Enzymes  Recent Labs   Lab 10/14/23  1017   LIPASE 51     Coagulation Profile  Recent Labs   Lab 10/16/23  0655 10/16/23  0431 10/15/23  0353 10/13/23  2130 10/13/23  2034   INR  --  1.32* 1.32* 1.20* 1.18*   PTT 50*  --  >240* 125* 121*         5. RADIOLOGY:   Recent Results (from the past 24 hour(s))   XR Abdomen Port 1 View    Narrative    Exam: XR ABDOMEN PORT 1 VIEW, 10/19/2023 2:21 PM    Indication: confirm NJ tube    Comparison: Abdomen x-ray 10/18/2023, line placement 10/19/2023    Findings:   Single portable AP 20 degrees upright view of the abdomen was  obtained. Feeding tube tip projects within the distal duodenum.  Enteric tube tip and sidehole projected expected location of stomach.  Partially visualized central venous catheter with tip in the mid right  atrium. Nonobstructive bowel gas pattern. No pneumatosis or portal  venous gas. Patchy airspace opacities with bilateral lung bases.      Impression    Impression:   1. Feeding tube tip in distal duodenum.  2. Enteric tube with tip and sidehole in the stomach.  3. Nonobstructive bowel gas pattern.    I have personally reviewed the examination and initial interpretation  and I agree  with the findings.    LANCE VILCHIS DO         SYSTEM ID:  [id number]       I saw and evaluated the patient in an independent visit and agree with the student's assessment and plan as written above. I was present at all times for any mentioned procedures and independently assessed the patient. Corrections to the above plan have been made by me as necessary.    Clint Braga MD  PGY-1, Neurosurgery  Off-service on SICU

## 2023-10-20 NOTE — PROGRESS NOTES
HEMODIALYSIS TREATMENT NOTE    Date: 10/20/2023  Time: 3:52 PM    Data:  Pre Wt: 81 kg (178 lb 9.2 oz) (Estimated)  Desired Wt: 79  kg   Post Wt: 79 kg (174 lb 2.6 oz) (Estimated)  Weight change: 2 kg  Ultrafiltration - Post Run Net Total Removed (mL): 2000 mL  Vascular Access Status: CVC  patent  Dialyzer Rinse: Clear  Total Blood Volume Processed: 65.7 L   Total Dialysis (Treatment) Time: 4   Dialysate Bath: K 3, Ca 3  Heparin: None    Lab:   Yes: HGB & Unfractionated Heparin    Interventions:  MAYA pt who is mechanically vented/endotracheal tube. Pt completed his 4 hours of HD. During tx, pt became hypotensive, UF was turned off effectively, and later became hypertensive, and writer began checking BP Via cuff vs Arterial line as cuff pressure was more accurate since pt was constantly restless with the Art Line. 2L of fluid net was pulled per order. Also got Epogen per order. Rinsed back post tx, lumen were saline locked end caps changed, and hand off report was given to ARACELY Tate.    Assessment:  -Pt was restless for the most part of HD  -BP Labile  -WENDI mentation     Plan:    Per renal

## 2023-10-20 NOTE — ANESTHESIA CARE TRANSFER NOTE
Patient: Alfredo Burnham    Procedure: Procedure(s):  Delayed primary subcutaneous abdominal closure       Diagnosis: Infrarenal abdominal aortic aneurysm, ruptured (H) [I71.33]  Diagnosis Additional Information: No value filed.    Anesthesia Type:   General     Note:    Oropharynx: endotracheal tube in place and nasal gatric tube in place  Level of Consciousness: iatrogenic sedation      Independent Airway: airway patency not satisfactory and stable  Dentition: dentition unchanged  Vital Signs Stable: post-procedure vital signs reviewed and stable  Report to RN Given: handoff report given  Patient transferred to: Medical/Surgical Unit    Handoff Report: Identifed the Patient, Identified the Reponsible Provider, Reviewed the pertinent medical history, Discussed the surgical course, Reviewed Intra-OP anesthesia mangement and issues during anesthesia, Set expectations for post-procedure period and Allowed opportunity for questions and acknowledgement of understanding      Vitals:  Vitals Value Taken Time   /73    Temp     Pulse 95    Resp 10    SpO2 100        Electronically Signed By: Crow Collazo MD  October 20, 2023  10:20 AM

## 2023-10-20 NOTE — PLAN OF CARE
SLP: Swallow orders received; pt remains intubated s/p abdominal closure in OR. Per RN, possible extubation later this afternoon. Will reschedule swallow eval to 10/21.

## 2023-10-20 NOTE — ANESTHESIA PREPROCEDURE EVALUATION
Anesthesia Pre-Procedure Evaluation    Patient: Alfredo Burnham   MRN: [id number] : 1955        Procedure : Procedure(s):  primary abdominal closure          Past Medical History:   Diagnosis Date    Hypertension 2011    Macular degeneration       Past Surgical History:   Procedure Laterality Date    AMPUTATE LEG ABOVE KNEE Left 10/17/2023    Procedure: AMPUTATION, ABOVE KNEE and Abdominal wound vac exchange;  Surgeon: Ash Boucher MBBS;  Location: [facility] OR    INCISION AND DRAINAGE ABDOMEN WASHOUT, COMBINED N/A 2023    Procedure: abdominal washout, combined, repacking,  abthera placement;  Surgeon: Ash Boucher MBBS;  Location: [facility] OR    INCISION AND DRAINAGE ABDOMEN WASHOUT, COMBINED N/A 2023    Procedure: Abdominal washout, Retroperitoneal closure, washout left lower extremity wound;  Surgeon: Boris Lowe;  Location: [facility] OR    IR OR ANGIOGRAM  2023    IRRIGATION AND DEBRIDEMENT ABDOMEN WASHOUT, COMBINED N/A 2023    Procedure: exploration of  ABDOMINAL CAVITY, placement of abthera;  Surgeon: Boris Lowe;  Location: [facility] OR    IRRIGATION AND DEBRIDEMENT ABDOMEN WASHOUT, COMBINED N/A 10/2/2023    Procedure: Exploratory laparotomy, abominal closure, wound vac change left lower extremity;  Surgeon: Jonathan Fry MD;  Location: [facility] OR    IRRIGATION AND DEBRIDEMENT LOWER EXTREMITY, COMBINED Left 2023    Procedure: exploration of left lower extremity, partial closure of left lower extremity, wound vac placement;  Surgeon: Boris Lowe;  Location: [facility] OR    LAPAROTOMY EXPLORATORY N/A 2023    Procedure: Laparotomy exploratory;  Surgeon: Roger Shirley MD;  Location: [facility] OR    REPAIR ANEURYSM ABDOMINAL AORTA N/A 2023    Procedure: Resection of Ruptureed Abdominal Aneurysm, Aortic biliary bypass with 20 x 10 mm Bifurcated hemagard graft, Temporary Abdominal Closure, Endovascular Balloon Inclusion of Aorta;  Surgeon: Boris Lowe;   Location: [facility] OR    SIGMOIDOSCOPY FLEXIBLE N/A 9/25/2023    Procedure: Sigmoidoscopy flexible;  Surgeon: Gabino Walker MD;  Location: [facility] GI    THROMBECTOMY LOWER EXTREMITY Left 9/25/2023    Procedure: SFA, popliteal, and tibial thromboembolectomy and four compartment fasciotomy;  Surgeon: Ash Boucher MBBS;  Location: [facility] OR      Allergies   Allergen Reactions    Penicillins Swelling     Facial swelling    Has tolerated cefazolin, cefepime      Social History     Tobacco Use    Smoking status: Every Day     Packs/day: .5     Types: Cigarettes    Smokeless tobacco: Not on file   Substance Use Topics    Alcohol use: Yes     Comment: 1-2/wk      Wt Readings from Last 1 Encounters:   10/20/23 81 kg (178 lb 9.2 oz)        Anesthesia Evaluation   Pt has had prior anesthetic. Type: General.    No history of anesthetic complications       ROS/MED HX  ENT/Pulmonary: Comment: Extubated yesterday    (+)     RIGO risk factors,  hypertension,         tobacco use, Current use,                      Neurologic:     (+)              TIA,                  Cardiovascular: Comment: # AAA rupture s/p open AAA repair 9/24/23 c/b possible embolic stroke, LLE ischemia now s/p AKA    (+)  hypertension- Peripheral Vascular Disease-   -  - -                                      METS/Exercise Tolerance:     Hematologic: Comments: # Thrombocytopenia  # anemia receiving intermittent transfusions      (+)      anemia,          Musculoskeletal:       GI/Hepatic:     (+) GERD,                   Renal/Genitourinary: Comment: On dialysis - neg Renal ROS   (+) renal disease,  Pt requires dialysis,           Endo:  - neg endo ROS  (-) Type II DM   Psychiatric/Substance Use:     (+)   alcohol abuse      Infectious Disease:  - neg infectious disease ROS     Malignancy:       Other:               OUTSIDE LABS:  CBC:   Lab Results   Component Value Date    WBC 14.2 (H) 10/20/2023    WBC 15.1 (H) 10/19/2023    HGB 7.0 (L) 10/20/2023    HGB 7.5 (L)  "10/19/2023    HCT 20.8 (L) 10/20/2023    HCT 22.3 (L) 10/19/2023     (L) 10/20/2023     (L) 10/19/2023     BMP:   Lab Results   Component Value Date     (L) 10/20/2023     (L) 10/19/2023    POTASSIUM 3.9 10/20/2023    POTASSIUM 3.6 10/19/2023    CHLORIDE 95 (L) 10/20/2023    CHLORIDE 94 (L) 10/19/2023    CO2 17 (L) 10/20/2023    CO2 18 (L) 10/19/2023    .0 (H) 10/20/2023    BUN 80.7 (H) 10/19/2023    CR 4.95 (H) 10/20/2023    CR 3.73 (H) 10/19/2023     (H) 10/20/2023     (H) 10/20/2023     COAGS:   Lab Results   Component Value Date    PTT 50 (H) 10/16/2023    INR 1.32 (H) 10/16/2023    FIBR 659 (H) 10/15/2023     POC: No results found for: \"BGM\", \"HCG\", \"HCGS\"  HEPATIC:   Lab Results   Component Value Date    ALBUMIN 2.5 (L) 10/20/2023    PROTTOTAL 5.7 (L) 10/20/2023    ALT 34 10/20/2023    AST 40 10/20/2023    ALKPHOS 106 10/20/2023    BILITOTAL 0.3 10/20/2023     OTHER:   Lab Results   Component Value Date    PH 7.41 10/14/2023    PH 7.41 10/14/2023    LACT 1.7 10/20/2023    A1C 5.7 (H) 09/25/2023    OMAR 8.5 (L) 10/20/2023    PHOS 3.5 10/20/2023    MAG 2.7 (H) 10/20/2023    LIPASE 51 10/14/2023    TSH 2.978 02/24/2009       Anesthesia Plan    ASA Status:  4       Anesthesia Type: General.     - Airway: ETT   Induction: Intravenous, Propofol.   Maintenance: Balanced.   Techniques and Equipment:     - Lines/Monitors: BIS, 2nd IV     Consents            Postoperative Care    Pain management: IV analgesics, Oral pain medications, Multi-modal analgesia.   PONV prophylaxis: Ondansetron (or other 5HT-3), Dexamethasone or Solumedrol     Comments:                Crow Collazo MD  "

## 2023-10-20 NOTE — BRIEF OP NOTE
[facility]    Brief Operative Note    Pre-operative diagnosis: Infrarenal abdominal aortic aneurysm, ruptured (H) [I71.33]  Post-operative diagnosis Same as pre-operative diagnosis    Procedure: Delayed primary subcutaneous abdominal closure, N/A - Abdomen    Surgeon: Surgeon(s) and Role:     * Boris Lowe C - Primary     * Ada Donald MD - Resident - Assisting  Anesthesia: General   Estimated Blood Loss: 10cc     Drains: Erggie-Saunders  Specimens: * No specimens in log *  Findings:   Healthy appearing subcutaneous tissue, in tact fascia.  .  Complications: None.  Implants: * No implants in log *

## 2023-10-20 NOTE — PROGRESS NOTES
[facility] Inpatient Dermatology Progress Note    Date of Admission: Sep 24, 2023   Encounter Date: 10/20/2023    Assessment and Plan:    1. Morbilliform (Exanthematous) Drug Eruption, possibly 2/2 cefepime    Patient's rash is mildly improved, with much less erythema on the face. Overall labs are reassuring although today his eosinophils did bump up slightly to 1.4. Clinical impression of a diffuse erythematous eruption (onset ~10/13) is rather nonspecific but most frequently represents a viral exanthem or a morbilliform drug eruption. Morbilliform or exanthematous drug eruptions usually manifest between 4 and 14 days after initiating a medication. The time to eruption may be shorter if the patient had previously been sensitized to the triggering medication (patient potentially with reaction to penicillins in the past). The most common culprits include antibiotics (penicillins and sulfas), allopurinol, anti-epileptics (phenytoin, barbiturates, carbamazepine), and NSAIDs, but many other drug culprits have been reported. A skin biopsy is not routinely necessary for the diagnosis of exanthematous drug eruption because the histopathologic findings (vacuolar interface dermatitis and tissue eosinophilia) are nonspecific. In this patient, he has been on a few recent antibiotics including vancomycin (10/1-2), metronidazole (9/25-10/3), and cefepime (9/25-10/3, 10/14-15). Cefepime would be the most suspicious culprit at this time.     For exanthematous drug eruptions, prompt withdrawal of the offending drug is the mainstay of treatment. It may take an additional 1-2 weeks after stopping the medication before the eruption completely resolves. Drug-induced hypersensitivity syndrome (DIHS; previously DRESS or Drug Reaction with Eosinophilia and Systemic Symptoms) is less favored at this time given his mild stable eosinophilia and resolved transaminitis.       Recommendations:  - continue daily CBC with diff  "and CMP to rule out DIHS/DRESS ((looking for new/worsening eosinophilia, transaminitis)  - continue triamcinolone 0.1% ointment BID rash on body (can change to cream per patient preference though ointment will be more potent)    Julee you for the dermatology consult. We will continue to follow peripherally. Please do not hesitate to contact the dermatology resident/faculty on call for any additional questions or concerns.     Staffed with attending physician, Dr. Kamini Austin MD   Dermatology Resident (PGY-4)    Staff Physician Comments:  I discussed the patient with the resident and I agree with the assessment and plan as documented in the resident's note.    Nicko Suárez MD  Professor  Head of Dermato-Allergy Division  Department of Dermatology  [facility]     Interval history:  - LFTs normalized  - Eosinophils improved but then today there is a slight bump up to 1.4    Medications:  Reviewed in epic, pertinent findings summarized as above    Physical exam:  /75   Pulse 111   Temp 97.8  F (36.6  C) (Axillary)   Resp 22   Ht 1.727 m (5' 8\")   Wt 81 kg (178 lb 9.2 oz)   SpO2 96%   BMI 27.15 kg/m    GEN: Intubated and sedated.   SKIN: Focused examination of the head, neck, arms, legs, upper chest, and lower abdomen was performed.  - Driscoll skin type: I  - There are pink macules coalescing into confluent patches involving the cheeks, neck, trunk, arms, and thighs. (Overall rash improving and more subtle and less bright red than prior)  - No other lesions of concern on areas examined.     Laboratory:  Reviewed in epic, pertinent findings summarized as above    Staff Involved:  Resident/Staff    "

## 2023-10-20 NOTE — PROGRESS NOTES
Pt arrived from OR intubated.  Pt has 7.0 oral tube 21cm at the lips.  BS clear and diminished in the RML, RLL.  Pt placed on , PEEP +5CMV 14, Vt 450, FiO2 .4, PEEP +5.  The plan is to wean as soon as patient wakes.

## 2023-10-20 NOTE — PLAN OF CARE
Neuro: Alert, oriented to self and occasionally to place   Cardio: ST with frequent PVC's. Labetolol 10 mg IV once for SBP>160.   Respiratory: Room air. LS coarse, diminidhed in the bases   GI: Rectal tube with watery brown stools. TF at goal-50 ml/hr since 0400. 30cc water flushes every 4hrs  : anuric, dialysis today  Skin: edematous scrotum, rosalie skin- ordered cream and ointment,- generalized bruising   Access: Right radial A-line, right PIV x2, right chest HD access, left subclavian triple lumen   Drips: Heparin at 2050. Will be turned off at 0800 for noon procedure   Labs: Hgb 7.0 order to transfuse this morning           Goal Outcome Evaluation:      Plan of Care Reviewed With: patient    Overall Patient Progress: improvingOverall Patient Progress: improving    Outcome Evaluation: Remained room air, delusional and confused

## 2023-10-20 NOTE — ANESTHESIA PROCEDURE NOTES
Airway       Patient location during procedure: OR       Procedure Start/Stop Times: 10/20/2023 8:13 AM  Staff -        Anesthesiologist:  Mason Curran MD       Resident/Fellow: Crow Collazo MD       Performed By: other anesthesia staff  Consent for Airway        Urgency: elective  Indications and Patient Condition       Indications for airway management: filemon-procedural       Induction type:RSI       Mask difficulty assessment: 0 - not attempted    Final Airway Details       Final airway type: endotracheal airway       Successful airway: ETT - single  Endotracheal Airway Details        ETT size (mm): 7.0       Cuffed: yes       Successful intubation technique: video laryngoscopy       VL Blade Size: MAC D Blade       Grade View of Cords: 1       Adjucts: stylet       Position: Left       Measured from: lips       Secured at (cm): 22       Bite block used: None    Post intubation assessment        Placement verified by: capnometry, equal breath sounds and chest rise        Number of attempts at approach: 1       Number of other approaches attempted: 1       Secured with: pink tape and plastic tape       Ease of procedure: easy       Dentition: Intact and Unchanged    Medication(s) Administered   Medication Administration Time: 10/20/2023 8:13 AM    Additional Comments       Intubation by MS, air leak noted with concern for inadequate advancement of tube past glottis, bougie used to ramrod further

## 2023-10-20 NOTE — PROGRESS NOTES
Major Shift Events:  Patient extubated at approx. 1100 to nasal cannula. CAM+. Neuro exam stable. Patient more delirius/confused this afternoon/evening. Mitts reordered. MD notified and prn and bedtime Seroquel dose was ordred. Up in chair for two hours. Speech consult order to do formal swallow, unable to complete today. Swabs given for comfort. Xray done post extubation to confirm placement of NG/ND tube. Straight cathed x1. TF restarted at 1600 at 30mL. Rectal tube remains in place. Did have one episode of leakage that did soil the top of his AKA dressing. Vascular surgery was notified and they were able to take down and redress soiled portion of dressing. Heparin gtt therapeutic. Family at bedside throughout the day and updated on plan of care.   Plan: OR tomorrow. HD tomorrow. Remain in ICU.   For vital signs and complete assessments, please see documentation flowsheets.

## 2023-10-20 NOTE — OP NOTE
VASCULAR SURGERY OPERATIVE REPORT     LOCATION:    [facility]    Alfredo Burnham  Medical Record #:  [id number]  YOB: 1955  Age:  68 year old       Date of Service: 10/20/2023     Preoperative diagnosis    Open subcutaneous abdominal incision  Status post open repair of ruptured pararenal aneurysm    Postoperative diagnosis    Open subcutaneous abdominal incision  Status post open repair of ruptured pararenal aneurysm    Surgeon: Boris Lowe MD  First Assistant: Ada Donald MD (Pgy 3 Vascular Surgery Resident)  A first assistant actively participated and was necessary for one or more of the following: opening, exposure and visualization during the case, maintaining hemostasis, wound closure resulting in its safe and expeditious completion.       Procedures:  Irrigation and debridement open midline abominal incision  Delayed primary closure of abdominal wound in multi-layer fashion  Placement of a subcutaneous drain    Findings:   Intact fascial closure  Granulating subcutaneous tissue  Small areas of skin maceration with mild desquamation that were debrided    Indication for procedure:    Mr. Burnham is a 68 year old male who presented to with a ruptured abdominal aortic aneurysm who previously underwent open repair with myself and Dr. Fry.  Subsequently he has had fascial closure and was undergoing wound vac therapy for his intentionally open midline skin.  The patient and his family understand the risks and would like to proceed with delayed primary closure.      Description of procedure:    Operative details:    The patient was brought to the hybrid operating room.  Under satisfactory general anesthesia, he was placed in supine position with all pressure points padded in standard fashion.  The lower chest and abdomen, were widely prepped and draped in sterile, standard fashion.  A surgical pause was performed with all members of the  surgical team to verify patient, medical record number, birth date, planned surgical procedure, surgical site, allergies, fire risk and perioperative antibiotics.    The wound was carefully inspected after removal of the wound vac dressing. Healthy granulating subcutaneous tissue identified. Several small areas of skin desquamation and maceration were debrided sharply with Metzenbaum scissors.  We  irrigated with 1 L of warm saline. Once hemostasis was achieved with electrocautery.  The wound was then closed in a 3 layer layer fashion with 2-0 PDS and the skin closed with staples.  The debridement was performed in a 3x1x1 in total area.    he was transferred to the recovery area in stable condition    Estimated blood loss: 5 ml     Specimens: none     Complications: none apparent    Plan:  -Ok with extubation  -Maintain drain to bulb suction          Boris Lowe MD, VI  VASCULAR AND ENDOVASCULAR SURGERY   [facility]

## 2023-10-21 ENCOUNTER — APPOINTMENT (OUTPATIENT)
Dept: PHYSICAL THERAPY | Facility: CLINIC | Age: 68
DRG: 268 | End: 2023-10-21
Payer: COMMERCIAL

## 2023-10-21 ENCOUNTER — APPOINTMENT (OUTPATIENT)
Dept: SPEECH THERAPY | Facility: CLINIC | Age: 68
DRG: 268 | End: 2023-10-21
Attending: STUDENT IN AN ORGANIZED HEALTH CARE EDUCATION/TRAINING PROGRAM
Payer: COMMERCIAL

## 2023-10-21 LAB
ALBUMIN SERPL BCG-MCNC: 2.7 G/DL (ref 3.5–5.2)
ALP SERPL-CCNC: 133 U/L (ref 40–129)
ALT SERPL W P-5'-P-CCNC: 49 U/L (ref 0–70)
ANION GAP SERPL CALCULATED.3IONS-SCNC: 15 MMOL/L (ref 7–15)
AST SERPL W P-5'-P-CCNC: 53 U/L (ref 0–45)
BASE EXCESS BLDV CALC-SCNC: 4.8 MMOL/L (ref -7.7–1.9)
BASO+EOS+MONOS # BLD AUTO: ABNORMAL 10*3/UL
BASO+EOS+MONOS NFR BLD AUTO: ABNORMAL %
BASOPHILS # BLD AUTO: ABNORMAL 10*3/UL
BASOPHILS # BLD MANUAL: 0 10E3/UL (ref 0–0.2)
BASOPHILS NFR BLD AUTO: ABNORMAL %
BASOPHILS NFR BLD MANUAL: 0 %
BILIRUB DIRECT SERPL-MCNC: 0.25 MG/DL (ref 0–0.3)
BILIRUB SERPL-MCNC: 0.4 MG/DL
BUN SERPL-MCNC: 67.9 MG/DL (ref 8–23)
CA-I BLD-MCNC: 4.4 MG/DL (ref 4.4–5.2)
CALCIUM SERPL-MCNC: 8.5 MG/DL (ref 8.8–10.2)
CHLORIDE SERPL-SCNC: 93 MMOL/L (ref 98–107)
CREAT SERPL-MCNC: 3 MG/DL (ref 0.67–1.17)
DEPRECATED HCO3 PLAS-SCNC: 25 MMOL/L (ref 22–29)
EGFRCR SERPLBLD CKD-EPI 2021: 22 ML/MIN/1.73M2
EOSINOPHIL # BLD AUTO: ABNORMAL 10*3/UL
EOSINOPHIL # BLD MANUAL: 1.8 10E3/UL (ref 0–0.7)
EOSINOPHIL NFR BLD AUTO: ABNORMAL %
EOSINOPHIL NFR BLD MANUAL: 13 %
ERYTHROCYTE [DISTWIDTH] IN BLOOD BY AUTOMATED COUNT: 17.6 % (ref 10–15)
GLUCOSE BLDC GLUCOMTR-MCNC: 127 MG/DL (ref 70–99)
GLUCOSE BLDC GLUCOMTR-MCNC: 130 MG/DL (ref 70–99)
GLUCOSE BLDC GLUCOMTR-MCNC: 134 MG/DL (ref 70–99)
GLUCOSE BLDC GLUCOMTR-MCNC: 145 MG/DL (ref 70–99)
GLUCOSE BLDC GLUCOMTR-MCNC: 146 MG/DL (ref 70–99)
GLUCOSE BLDC GLUCOMTR-MCNC: 151 MG/DL (ref 70–99)
GLUCOSE SERPL-MCNC: 160 MG/DL (ref 70–99)
HCO3 BLDV-SCNC: 28 MMOL/L (ref 21–28)
HCT VFR BLD AUTO: 24.2 % (ref 40–53)
HGB BLD-MCNC: 8 G/DL (ref 13.3–17.7)
IMM GRANULOCYTES # BLD: ABNORMAL 10*3/UL
IMM GRANULOCYTES NFR BLD: ABNORMAL %
LACTATE SERPL-SCNC: 1.7 MMOL/L (ref 0.7–2)
LACTATE SERPL-SCNC: 2.2 MMOL/L (ref 0.7–2)
LACTATE SERPL-SCNC: 2.4 MMOL/L (ref 0.7–2)
LYMPHOCYTES # BLD AUTO: ABNORMAL 10*3/UL
LYMPHOCYTES # BLD MANUAL: 1.3 10E3/UL (ref 0.8–5.3)
LYMPHOCYTES NFR BLD AUTO: ABNORMAL %
LYMPHOCYTES NFR BLD MANUAL: 9 %
MAGNESIUM SERPL-MCNC: 2 MG/DL (ref 1.7–2.3)
MCH RBC QN AUTO: 29.6 PG (ref 26.5–33)
MCHC RBC AUTO-ENTMCNC: 33.1 G/DL (ref 31.5–36.5)
MCV RBC AUTO: 90 FL (ref 78–100)
METAMYELOCYTES # BLD MANUAL: 0.6 10E3/UL
METAMYELOCYTES NFR BLD MANUAL: 4 %
MONOCYTES # BLD AUTO: ABNORMAL 10*3/UL
MONOCYTES # BLD MANUAL: 0.8 10E3/UL (ref 0–1.3)
MONOCYTES NFR BLD AUTO: ABNORMAL %
MONOCYTES NFR BLD MANUAL: 6 %
MYELOCYTES # BLD MANUAL: 0.1 10E3/UL
MYELOCYTES NFR BLD MANUAL: 1 %
NEUTROPHILS # BLD AUTO: ABNORMAL 10*3/UL
NEUTROPHILS # BLD MANUAL: 9.3 10E3/UL (ref 1.6–8.3)
NEUTROPHILS NFR BLD AUTO: ABNORMAL %
NEUTROPHILS NFR BLD MANUAL: 67 %
NRBC # BLD AUTO: 0 10E3/UL
NRBC BLD AUTO-RTO: 0 /100
O2/TOTAL GAS SETTING VFR VENT: 21 %
OXYHGB MFR BLDV: 64 % (ref 70–75)
PCO2 BLDV: 37 MM HG (ref 40–50)
PH BLDV: 7.5 [PH] (ref 7.32–7.43)
PHOSPHATE SERPL-MCNC: 3 MG/DL (ref 2.5–4.5)
PLAT MORPH BLD: ABNORMAL
PLATELET # BLD AUTO: 159 10E3/UL (ref 150–450)
PO2 BLDV: 32 MM HG (ref 25–47)
POTASSIUM SERPL-SCNC: 3.8 MMOL/L (ref 3.4–5.3)
PROT SERPL-MCNC: 5.7 G/DL (ref 6.4–8.3)
RBC # BLD AUTO: 2.7 10E6/UL (ref 4.4–5.9)
RBC MORPH BLD: ABNORMAL
SODIUM SERPL-SCNC: 133 MMOL/L (ref 135–145)
UFH PPP CHRO-ACNC: 0.39 IU/ML
WBC # BLD AUTO: 13.9 10E3/UL (ref 4–11)

## 2023-10-21 PROCEDURE — 85007 BL SMEAR W/DIFF WBC COUNT: CPT

## 2023-10-21 PROCEDURE — 83605 ASSAY OF LACTIC ACID: CPT | Performed by: PHYSICIAN ASSISTANT

## 2023-10-21 PROCEDURE — 84100 ASSAY OF PHOSPHORUS: CPT | Performed by: PHYSICIAN ASSISTANT

## 2023-10-21 PROCEDURE — 85520 HEPARIN ASSAY: CPT | Performed by: SURGERY

## 2023-10-21 PROCEDURE — 200N000002 HC R&B ICU UMMC

## 2023-10-21 PROCEDURE — 83735 ASSAY OF MAGNESIUM: CPT | Performed by: PHYSICIAN ASSISTANT

## 2023-10-21 PROCEDURE — 250N000011 HC RX IP 250 OP 636: Performed by: SURGERY

## 2023-10-21 PROCEDURE — 82330 ASSAY OF CALCIUM: CPT | Performed by: PHYSICIAN ASSISTANT

## 2023-10-21 PROCEDURE — 250N000013 HC RX MED GY IP 250 OP 250 PS 637: Performed by: PHARMACIST

## 2023-10-21 PROCEDURE — 250N000011 HC RX IP 250 OP 636: Performed by: STUDENT IN AN ORGANIZED HEALTH CARE EDUCATION/TRAINING PROGRAM

## 2023-10-21 PROCEDURE — 97530 THERAPEUTIC ACTIVITIES: CPT | Mod: GP | Performed by: PHYSICAL THERAPIST

## 2023-10-21 PROCEDURE — 250N000013 HC RX MED GY IP 250 OP 250 PS 637

## 2023-10-21 PROCEDURE — 82805 BLOOD GASES W/O2 SATURATION: CPT | Performed by: STUDENT IN AN ORGANIZED HEALTH CARE EDUCATION/TRAINING PROGRAM

## 2023-10-21 PROCEDURE — 97164 PT RE-EVAL EST PLAN CARE: CPT | Mod: GP | Performed by: PHYSICAL THERAPIST

## 2023-10-21 PROCEDURE — 83605 ASSAY OF LACTIC ACID: CPT

## 2023-10-21 PROCEDURE — 250N000013 HC RX MED GY IP 250 OP 250 PS 637: Performed by: NURSE PRACTITIONER

## 2023-10-21 PROCEDURE — 250N000011 HC RX IP 250 OP 636: Performed by: NURSE PRACTITIONER

## 2023-10-21 PROCEDURE — 250N000013 HC RX MED GY IP 250 OP 250 PS 637: Performed by: SURGERY

## 2023-10-21 PROCEDURE — 99233 SBSQ HOSP IP/OBS HIGH 50: CPT | Performed by: STUDENT IN AN ORGANIZED HEALTH CARE EDUCATION/TRAINING PROGRAM

## 2023-10-21 PROCEDURE — 83605 ASSAY OF LACTIC ACID: CPT | Performed by: NURSE PRACTITIONER

## 2023-10-21 PROCEDURE — 80076 HEPATIC FUNCTION PANEL: CPT

## 2023-10-21 PROCEDURE — 92610 EVALUATE SWALLOWING FUNCTION: CPT | Mod: GN

## 2023-10-21 PROCEDURE — 99291 CRITICAL CARE FIRST HOUR: CPT | Mod: GC | Performed by: SURGERY

## 2023-10-21 PROCEDURE — 85027 COMPLETE CBC AUTOMATED: CPT

## 2023-10-21 PROCEDURE — P9045 ALBUMIN (HUMAN), 5%, 250 ML: HCPCS | Mod: JZ

## 2023-10-21 PROCEDURE — 250N000011 HC RX IP 250 OP 636: Mod: JZ

## 2023-10-21 PROCEDURE — 250N000011 HC RX IP 250 OP 636

## 2023-10-21 PROCEDURE — P9047 ALBUMIN (HUMAN), 25%, 50ML: HCPCS | Performed by: NURSE PRACTITIONER

## 2023-10-21 RX ORDER — PROPOFOL 10 MG/ML
INJECTION, EMULSION INTRAVENOUS
Status: COMPLETED
Start: 2023-10-21 | End: 2023-10-21

## 2023-10-21 RX ORDER — ALBUMIN (HUMAN) 12.5 G/50ML
50 SOLUTION INTRAVENOUS ONCE
Qty: 200 ML | Refills: 0 | Status: COMPLETED | OUTPATIENT
Start: 2023-10-21 | End: 2023-10-21

## 2023-10-21 RX ORDER — PANTOPRAZOLE SODIUM 40 MG/1
40 TABLET, DELAYED RELEASE ORAL
Status: DISCONTINUED | OUTPATIENT
Start: 2023-10-21 | End: 2023-10-21

## 2023-10-21 RX ORDER — QUETIAPINE FUMARATE 25 MG/1
25 TABLET, FILM COATED ORAL 2 TIMES DAILY
Status: DISCONTINUED | OUTPATIENT
Start: 2023-10-21 | End: 2023-10-21

## 2023-10-21 RX ORDER — POTASSIUM CHLORIDE 1.5 G/1.58G
40 POWDER, FOR SOLUTION ORAL ONCE
Qty: 2 PACKET | Refills: 0 | Status: COMPLETED | OUTPATIENT
Start: 2023-10-21 | End: 2023-10-21

## 2023-10-21 RX ORDER — PANTOPRAZOLE SODIUM 40 MG/1
40 TABLET, DELAYED RELEASE ORAL ONCE
Status: COMPLETED | OUTPATIENT
Start: 2023-10-21 | End: 2023-10-21

## 2023-10-21 RX ORDER — MAGNESIUM SULFATE HEPTAHYDRATE 40 MG/ML
2 INJECTION, SOLUTION INTRAVENOUS ONCE
Status: COMPLETED | OUTPATIENT
Start: 2023-10-21 | End: 2023-10-21

## 2023-10-21 RX ORDER — PANTOPRAZOLE SODIUM 40 MG/1
40 TABLET, DELAYED RELEASE ORAL ONCE
Status: DISCONTINUED | OUTPATIENT
Start: 2023-10-21 | End: 2023-10-21

## 2023-10-21 RX ADMIN — INSULIN ASPART 1 UNITS: 100 INJECTION, SOLUTION INTRAVENOUS; SUBCUTANEOUS at 08:47

## 2023-10-21 RX ADMIN — MAGNESIUM SULFATE IN WATER 2 G: 40 INJECTION, SOLUTION INTRAVENOUS at 10:52

## 2023-10-21 RX ADMIN — TRIAMCINOLONE ACETONIDE: 1 OINTMENT TOPICAL at 20:32

## 2023-10-21 RX ADMIN — QUETIAPINE FUMARATE 75 MG: 50 TABLET ORAL at 22:54

## 2023-10-21 RX ADMIN — INSULIN ASPART 1 UNITS: 100 INJECTION, SOLUTION INTRAVENOUS; SUBCUTANEOUS at 00:07

## 2023-10-21 RX ADMIN — ACETAMINOPHEN 975 MG: 325 TABLET, FILM COATED ORAL at 03:52

## 2023-10-21 RX ADMIN — ALBUMIN HUMAN 12.5 G: 0.05 INJECTION, SOLUTION INTRAVENOUS at 18:50

## 2023-10-21 RX ADMIN — HEPARIN SODIUM 2050 UNITS/HR: 10000 INJECTION, SOLUTION INTRAVENOUS at 18:59

## 2023-10-21 RX ADMIN — HYDROCORTISONE: 25 CREAM TOPICAL at 08:37

## 2023-10-21 RX ADMIN — INSULIN ASPART 1 UNITS: 100 INJECTION, SOLUTION INTRAVENOUS; SUBCUTANEOUS at 04:11

## 2023-10-21 RX ADMIN — ACETAMINOPHEN 975 MG: 325 TABLET, FILM COATED ORAL at 08:36

## 2023-10-21 RX ADMIN — QUETIAPINE FUMARATE 25 MG: 25 TABLET ORAL at 20:26

## 2023-10-21 RX ADMIN — TRIAMCINOLONE ACETONIDE: 1 OINTMENT TOPICAL at 08:37

## 2023-10-21 RX ADMIN — OXYCODONE HYDROCHLORIDE 5 MG: 5 TABLET ORAL at 20:26

## 2023-10-21 RX ADMIN — ATORVASTATIN CALCIUM 10 MG: 10 TABLET, FILM COATED ORAL at 20:26

## 2023-10-21 RX ADMIN — POTASSIUM CHLORIDE 40 MEQ: 1.5 POWDER, FOR SOLUTION ORAL at 12:07

## 2023-10-21 RX ADMIN — ALBUMIN HUMAN 50 G: 0.25 SOLUTION INTRAVENOUS at 10:51

## 2023-10-21 RX ADMIN — Medication 1 MG: at 17:28

## 2023-10-21 RX ADMIN — ACETAMINOPHEN 975 MG: 325 TABLET, FILM COATED ORAL at 20:25

## 2023-10-21 RX ADMIN — PROCHLORPERAZINE EDISYLATE 5 MG: 5 INJECTION INTRAMUSCULAR; INTRAVENOUS at 20:26

## 2023-10-21 RX ADMIN — HYDROCORTISONE: 25 CREAM TOPICAL at 20:32

## 2023-10-21 RX ADMIN — HYDROMORPHONE HYDROCHLORIDE 0.3 MG: 1 INJECTION, SOLUTION INTRAMUSCULAR; INTRAVENOUS; SUBCUTANEOUS at 15:03

## 2023-10-21 RX ADMIN — Medication 40 MG: at 12:08

## 2023-10-21 RX ADMIN — PANTOPRAZOLE SODIUM 40 MG: 40 TABLET, DELAYED RELEASE ORAL at 20:26

## 2023-10-21 RX ADMIN — Medication 15 ML: at 08:36

## 2023-10-21 RX ADMIN — ACETAMINOPHEN 975 MG: 325 TABLET, FILM COATED ORAL at 15:03

## 2023-10-21 RX ADMIN — QUETIAPINE FUMARATE 25 MG: 25 TABLET ORAL at 08:36

## 2023-10-21 RX ADMIN — OXYCODONE HYDROCHLORIDE 5 MG: 5 TABLET ORAL at 16:40

## 2023-10-21 ASSESSMENT — ACTIVITIES OF DAILY LIVING (ADL)
ADLS_ACUITY_SCORE: 51

## 2023-10-21 NOTE — PLAN OF CARE
[id number]: Oriented to self and year only. Hoarse, quiet voice. Able to follow simple commands. Mitts on for safety-pulling at lines. RA LS clear, weak non-productive cough. -120s with PACs, afebrile. SBP goal >160. Heparin gtt therapeutic this AM at 2,050units/hr. NJ TF at 50cc/hr FWF [id number]. NG to LIS. Rectal tube with 300cc out and leakage around tube. Anuric. Rash throughout body-scheduled creams applied. L) AKA site wrapped (to be changed by vasc MD on Monday). Abdominal incision CDI with primapore and abd binder in place. Mepilex to coccyx. Abd FARIDA with bloody output. R) HD line. L) TL CVC, 2 PIVs SL. R) radial A-line.   Will continue to monitor and follow POC.

## 2023-10-21 NOTE — PROGRESS NOTES
SURGICAL ICU PROGRESS NOTE  10/21/2023        Date of Service (when I saw the patient): 10/21/2023    ASSESSMENT:  Alfredo Burnham is a 68 year old male who was admitted to the SICU on 9/24/2023 s/p open AAA repair. Patient has a history of HTN and previous TIA. He presented to the ED on 9/24 with right sided abdominal pain and was found to have a contained, ruptured AAA on CT. 30 min super-celiac clamp time. Prolonged intra-renal clamp. 9/25 s/p flex sig showing rectal ischemia, abdominal washout showing a hemostatic AAA graft and no evidence of transmural colonic or small bowel ischemia, and an unsuccessful LLE embolectomy. 9/26 s/p repeat abdominal washout, retroperitoneal closure, LLE wound washout. 9/29 s/p repeat abd washout, bowel appeared well perfused w/o notable ischemia. OR 10/2 for abdominal fascia closure. Extubated 10/4. Unequal pupils and R facial droop, MRI brain showed multiple small infarcts, likely embolic. Patient was transferred out of the surgical ICU on 10/12. On 10/13, patient completed dialysis run and suddenly started experiencing desaturation. Patient was emergently intubated for Acute Hypoxic Respiratory Failure in dialysis unit and transferred to SICU for further cares. L AKA 10/18 with abd wound vac exchange. OR on 10/20 for primary skin closure. Patient admitted to ICU for intubation and sedation management.     CHANGES and MAJOR THINGS TODAY:   - Extubated in the evening, tolerating well.  - Restarted on PPI  - Replaced K, K goal >4  - Evening Seroquel changed to 75qhs  - Continue precedex straight rate     PLAN:  Neurological:  # Sedation  RTOR 10/20 pt returned on propofol 40, plan to wean   - Will continue precedex only at bedtime for delirium    # Acute pain  RAPS femoral nerve block 10/18  - Multi-modal pain control effective (scheduled tylenol, oxycodone PRN, dilaudid PRN, robaxin 500 mg TID PRN).      #Facial droop, left lacunar infarct  #Punctate lesions of cerebellum    - Follow-up with stroke neurology 4-6 weeks after discharge  - ADDIS with no evidence of embolic source     # Delirium, ongoing  # Mixed metabolic encephalopathy   # Sedation, RASS goal 0 to -1  - Monitor neurological status. Delirium preventions and precautions  - Seroquel 70 mg at bedtime, precedex overnight     Pulmonary:  # Acute hypoxic respiratory failure  # Re-intubation 10/13 (after prev extubation 10/3)  # Pulmonary embolism   Satting well overnight, %.   Extubated, doing well. Tolerating 4L NC to RA    Cardiovascular:    # Contained AAA rupture s/p open repair 9/25  # Acute critical limb ischaemia of LLE  # Hx of HTN  # Tachycardia  # L iliacus hematoma  # Peripancreatic hematoma   - Continue to monitor hemodynamic status; Goal MAP >65, SBP <160  - Heparin gtt continuing  - Continue Lipitor 10 mg     GI/Nutrition:    # s/p open AAA repair, wound vac device in place  # Post-operative melena, resolved  # Rectal ischemia, concern for, resolved  # Aspiration event 10/9  - BR with senna BID and miralax daily  - TPN discontinued yesterday    - ND tube placed, TF at goal   - PPI readded given high NG output, continue to monitor  - Monitor electrolytes, replaced K and Mag     Renal/Fluids/Electrolytes:  # Protein calorie deficit malnutrition, risk of   # Acute kidney injury  # Oliguria  # Haemodialysis  #Hyponatremia   - Elevated lactate is likely from decreased volume status with dialysis, NG output and diarrhea. Bolused 50g 25% albumin x1, reassess in PM  - HD per nephrology, w/ tunneled dialysis line  - Strict intake and output  - Monitor electrolytes and replace, high protocol for Mag, Phos and K    Endocrine:  # Stress hyperglycaemia  - High SSI for glucose control  - q6h BG checks     Infectious disease:   # oropharyngeal candidiasis  # Likely aspiration event during ADDIS  WBC up trending at 15.3. Sputum culture collected, growing 1+ GPCs.  - Completed course of levaquin (transitioned from  Ceftriaxone)    Hematology:    # Acute blood loss anemia  - Repeat Hb check daily  - Heparin gtt continued    MSK:  # Weakness and deconditioning of critical illness  # Left lower limb ischemia   - Passive ROM at bedside with RN  Marisol WINSLOW completed Tuesday 10/17  - Physical therapy when possible     Skin:  #Likely Spongotic Dermatitis  Rash present since ~10/13 on abdomen, trunk, thighs. No medications per pharm thought to be the cause. Derm evaluated and felt it was a morbilliform eruption vs less likely DRESS syndrome.   - CBC with differential daily  - LFTs daily  - HHV-6 (-) and EBV (+), no indication for further management at this time   - Continue triamcinolone 0.1% ointment, hydrocortisone cream twice daily to all body areas with rash. Zyrtec 10 mg q12, benadryl TID PRN.   - Monitor closely for progression or change in skin findings.  - Consider peripheral smear to assess for reactive lymphocytes  - Wound on left cheek - seen by [facility]    General Cares/Prophylaxis:    DVT Prophylaxis: Pneumatic Compression Devices and heparin gtt   GI Prophylaxis: PPI  Restraints: Restraints for medical healing needed: YES     Lines/ tubes/ drains:  -R tunneled HD line  -L subclavian triple lumen  -NDT  -NGT  -PIVs in RUE    Disposition:  - Recommend transfer to floor when able.     Patient seen, findings and plan discussed with surgical ICU staff, Dr. Mccall.    Clint Braga MD  Surgical ICU  ====================================  INTERVAL HISTORY:   Extubated in evening, doing well. Continues to be delirius, but alert, able to answer simple questions. Reports difficulty sleeping.    ROS unable to be performed due to sedation.       OBJECTIVE:   1. VITAL SIGNS:   Temp:  [97.8  F (36.6  C)-98.9  F (37.2  C)] 98.5  F (36.9  C)  Pulse:  [] 116  Resp:  [9-29] 20  BP: ()/(59-98) 109/73  MAP:  [64 mmHg-129 mmHg] 85 mmHg  Arterial Line BP: ()/(54-99) 128/63  FiO2 (%):  [30 %-40 %] 40 %  SpO2:  [87 %-100 %] 97 %  Vent  Mode: CPAP/PS  (Continuous positive airway pressure with Pressure Support)  FiO2 (%): 40 %  Resp Rate (Set): 14 breaths/min  Tidal Volume (Set, mL): 450 mL  PEEP (cm H2O): 5 cmH2O  Pressure Support (cm H2O): 5 cmH2O  Resp: 20      2. INTAKE/ OUTPUT:   I/O last 3 completed shifts:  In: 2689.14 [I.V.:1429.14; NG/GT:360]  Out: 4345 [Emesis/NG output:1950; Drains:45; Other:2000; Stool:350]    3. PHYSICAL EXAMINATION:  General: Comfortable on RA. Speech is soft and gargled.    HEENT: normocephalic,atraumatic  Neuro: opens eyes to command, moves all extremities equally. Oriented to self.  Pulm/Resp: Equal chest rise, no respiratory distress  CV: hypertensive, off pressors  Abdomen: Abdomen mildly distended. Midline laparotomy incision. Dressing in place, CDI. abdominal binder in place.  Gu: decreased scrotal swelling.  MSK/Extremities: L AKA wrapped and dressed without strikethrough    4. INVESTIGATIONS:   Arterial Blood Gases   No lab results found in last 7 days.    Complete Blood Count   Recent Labs   Lab 10/21/23  0354 10/20/23  1340 10/20/23  0408 10/19/23  0317 10/18/23  0344   WBC 13.9*  --  14.2* 15.1* 14.3*   HGB 8.0* 9.0* 7.0* 7.5* 6.8*     --  147* 132* 131*     Basic Metabolic Panel  Recent Labs   Lab 10/21/23  0401 10/21/23  0354 10/21/23  0005 10/20/23  2059 10/20/23  1224 10/20/23  0408 10/19/23  0841 10/19/23  0317 10/18/23  0409 10/18/23  0344   NA  --  133*  --   --   --  131*  --  130*  --  128*   POTASSIUM  --  3.8  --   --   --  3.9  --  3.6  --  4.1   CHLORIDE  --  93*  --   --   --  95*  --  94*  --  90*   CO2  --  25  --   --   --  17*  --  18*  --  15*   BUN  --  67.9*  --   --   --  127.0*  --  80.7*  --  110.0*   CR  --  3.00*  --   --   --  4.95*  --  3.73*  --  5.66*   * 160* 145* 156*   < > 141*  144*   < > 204*   < > 171*    < > = values in this interval not displayed.     Liver Function Tests  Recent Labs   Lab 10/21/23  0354 10/20/23  0408 10/19/23  0317 10/18/23  0344  10/17/23  0335 10/16/23  0431 10/15/23  0353   AST 53* 40 43 49*   < > 59* 65*   ALT 49 34 30 32   < > 33 34   ALKPHOS 133* 106 103 96   < > 82 98   BILITOTAL 0.4 0.3 0.3 0.3   < > 0.4 0.4   ALBUMIN 2.7* 2.5* 2.4* 2.5*   < > 2.5* 2.5*   INR  --   --   --   --   --  1.32* 1.32*    < > = values in this interval not displayed.     Pancreatic Enzymes  Recent Labs   Lab 10/14/23  1017   LIPASE 51     Coagulation Profile  Recent Labs   Lab 10/16/23  0655 10/16/23  0431 10/15/23  0353   INR  --  1.32* 1.32*   PTT 50*  --  >240*         5. RADIOLOGY:   Recent Results (from the past 24 hour(s))   XR Abdomen Port 1 View    Narrative    Exam: XR ABDOMEN PORT 1 VIEW, 10/20/2023 8:12 PM    Indication: Confirm NG placement    Comparison: Abdomen x-ray 10/19/2023    Findings:   Single portable AP 20 degrees upright view of the upper abdomen was  obtained. Feeding tube tip in the distal duodenum. Enteric tube with  tip and sidehole projecting over the stomach. Partially visualized  large bore catheter projecting over the right atrium. Nonobstructive  bowel gas pattern. No pneumatosis or portal venous gas. Midline  surgical incision. Unchanged diffuse mixed opacities with blunting of  left costophrenic angle.      Impression    Impression: Enteric tube tip and sidehole projecting over the stomach.  Nonobstructive bowel gas pattern.    I have personally reviewed the examination and initial interpretation  and I agree with the findings.    SABINO MURRIETA, DO         SYSTEM ID:  [id number]

## 2023-10-21 NOTE — PROGRESS NOTES
10/21/23 1100   Appointment Info   Signing Clinician's Name / Credentials (SLP) Haylie Eric MA CCC-SLP   General Information   Onset of Illness/Injury or Date of Surgery 09/24/23   Referring Physician Oleksandr Henson PA-C   Pertinent History of Current Problem Pt is a 68 y.o male with complex hx of TIA, HTN, blindness who presented to [facility] 9/24 with severe abdominal pain radiating to flank and back, CT revealed AAA with a contained rupture.  Taken to OR for aortobiiliac bypass and resection of ruptured pararenal aneurysm.   Post op course complicated by hypotension requiring pressors and metabolic acidosis.  Pt developed resp arrest immediately post HD run on 10/13 requiring return to ICU and intubation.  Extubated again yesterday 10/20.  Swallow eval completed this AM per MD orders.       Present no   Pain Assessment   Patient Currently in Pain No   Oral Motor   Oral Musculature generally intact   Structural Abnormalities none present   Mucosal Quality dry   Oral Motor Deficits Observed Vocal Quality/Secretion Management (Oral Motor) (Group)   Dentition (Oral Motor)   Dentition (Oral Motor) natural dentition;adequate dentition   Facial Symmetry (Oral Motor)   Facial Symmetry (Oral Motor) WNL   Lip Function (Oral Motor)   Lip Range of Motion (Oral Motor) WNL   Tongue Function (Oral Motor)   Tongue ROM (Oral Motor) WNL   Jaw Function (Oral Motor)   Jaw Function (Oral Motor) WNL   Facial Sensation   Facial Sensation WNL   Vocal Quality/Secretion Management (Oral Motor)   Vocal Quality (Oral Motor) dysphonic;hoarse   Secretion Management (Oral Motor) WNL   General Swallowing Observations   Past History of Dysphagia Speech therapy 10/11-10/12 recommending NPO   Respiratory Support room air   Current Diet/Method of Nutritional Intake (General Swallowing Observations, NIS) NPO   Swallowing Evaluation Clinical swallow evaluation   Clinical Swallow Evaluation   Feeding Assistance  dependent   Clinical Swallow Evaluation Textures Trialed thin liquids   Clinical Swallow Eval: Thin Liquid Texture Trial   Mode of Presentation, Thin Liquids fed by clinician;spoon   Volume of Liquid or Food Presented 2 ice chips, 3 teaspoons water   Oral Phase of Swallow WFL   Pharyngeal Phase of Swallow impaired;coughing/choking   Diagnostic Statement Overt s/sx of aspiration.   Esophageal Phase of Swallow   Patient reports or presents with symptoms of esophageal dysphagia No   Swallowing Recommendations   Diet Consistency Recommendations NPO;ice chips only   Medication Administration Recommendations, Swallowing (SLP) Non orally   Instrumental Assessment Recommendations instrumental evaluation not recommended at this time   General Therapy Interventions   Planned Therapy Interventions Dysphagia Treatment   Dysphagia treatment Modified diet education;Instruction of safe swallow strategies;Compensatory strategies for swallowing   Clinical Impression   Criteria for Skilled Therapeutic Interventions Met (SLP Eval) Yes, treatment indicated   SLP Diagnosis Severe oropharyngeal dysphagia   Risks & Benefits of therapy have been explained evaluation/treatment results reviewed;care plan/treatment goals reviewed;risks/benefits reviewed;current/potential barriers reviewed;participants voiced agreement with care plan;participants included;patient   Clinical Impression Comments Clinical swallow eval completed per MD orders.  Pt presents with mod-severe oropharyngeal dysphagia in the setting of generalized weakness and multiple intubations.  Oral mech exam remarkable for dysphonic, hoarse vocal quality and weak cough. Oral cares completed.  Pt assessed with ice chips and teaspoons of water before pt kindly declining further trials stating that he was full.  Pharyngeal phase remarkable for overt s/sx of aspiration evidenced by delayed coughing after trials of water.  Recommed pt remain NPO, ice chips ok for comfort after good  oral cares when pt is upright and alert only.  Recommend ENT consult if pt's voice remains dysphonic. SLP to follow for PO readiness.   SLP Discharge Planning   SLP Plan Assess readiness to initiate PO intake, recommend ENT consult if pt's voice remains dysphonic, video likely warranted in 1-2 days.   SLP Discharge Recommendation sub acute care setting   SLP Rationale for DC Rec Severe oropharyngeal dysphagia   SLP Brief overview of current status  Recommend NPO, ice chips OK for comfort after oral cares when pt is upright and alert only.  SLP to follow for PO readiness.

## 2023-10-21 NOTE — PROGRESS NOTES
Nephrology Progress Note  10/21/2023         Assessment & Recommendations:   Alfredo Burnham is a 68 yom with complex hx of TIA, HTN, blindness who presented to [facility] 9/24 with severe abdominal pain radiating to flank and back, CT revealed AAA with a contained rupture.  Taken to OR for aortobiiliac bypass and resection of ruptured pararenal aneurysm.   Post op course complicated by hypotension requiring pressors and metabolic acidosis.  Baseline Cr 1.0 on presentation but on the rise to >5 at time of renal consult for MAYA management and possible RRT. He started CRRT and has now transitioned to iHD.    # MAYA with normal baseline creatinine 1.0 mg/dl, anuric  Last had HD yesterday, 10/20. Eval for HD tomorrow, but otherwise aim for sticking with MWF schedule.     # BP, volume status  Acceptable BPs this AM. BP did drop during HD run yesterday, 10/20.  -minimize obligate ins, as able (getting approximately 2L/day, though his stool for outs is not fully captured)     # hyponatremia  Will continue to improve on dialysis.     # anemia  Will provide epo on HD. Acute management per primary team.       Recommendations were communicated to primary team via this note.     Jone Davis MD   Division of Renal Disease and Hypertension  [facility]  [email or web address]  Vocera Web Console        Interval History :   Nursing and provider notes from last 24 hours reviewed.  In the last 24 hours Alfredo Burnham had HD yesterday. He and his wife report it didn't go as well as other dialysis runs. Dialysis nurse notes state that HD was complicated by hypotension during the run.     Review of Systems:   I reviewed the following systems:  GI: no nausea or vomiting or diarrhea.   Neuro: no confusion  Constitutional: no fever or chills  CV: no dyspnea or edema.  no chest pain.    Physical Exam:   I/O last 3 completed shifts:  In: 2689.14 [I.V.:1429.14; NG/GT:360]  Out: 4345 [Emesis/NG output:1950; Drains:45; Other:2000; Stool:350]  "  /73   Pulse 115   Temp 98.7  F (37.1  C) (Axillary)   Resp 20   Ht 1.727 m (5' 8\")   Wt 76.1 kg (167 lb 12.3 oz)   SpO2 97%   BMI 25.51 kg/m       GENERAL APPEARANCE: NAD  EYES:  no scleral icterus, pupils equal  PULM: lungs clear to auscultation bilaterally, equal air movement  CV: regular rhythm, normal rate, no rub     -edema - none in RLE  GI: firm, TTP diffusely   INTEGUMENT: no rash on exposed skin  NEURO: conversant   Access: TD JENNIFER, c/d/i    Labs:   All labs reviewed by me  Electrolytes/Renal -   Recent Labs   Lab Test 10/21/23  0846 10/21/23  0401 10/21/23  0354 10/20/23  1224 10/20/23  0408 10/19/23  0841 10/19/23  0317   NA  --   --  133*  --  131*  --  130*   POTASSIUM  --   --  3.8  --  3.9  --  3.6   CHLORIDE  --   --  93*  --  95*  --  94*   CO2  --   --  25  --  17*  --  18*   BUN  --   --  67.9*  --  127.0*  --  80.7*   CR  --   --  3.00*  --  4.95*  --  3.73*   * 146* 160*   < > 141*  144*   < > 204*   OMAR  --   --  8.5*  --  8.5*  --  8.4*   MAG  --   --  2.0  --  2.7*  --  2.0   PHOS  --   --  3.0  --  3.5  --  3.2    < > = values in this interval not displayed.       CBC -   Recent Labs   Lab Test 10/21/23  0354 10/20/23  1340 10/20/23  0408 10/19/23  0317   WBC 13.9*  --  14.2* 15.1*   HGB 8.0* 9.0* 7.0* 7.5*     --  147* 132*       LFTs -   Recent Labs   Lab Test 10/21/23  0354 10/20/23  0408 10/19/23  0317   ALKPHOS 133* 106 103   BILITOTAL 0.4 0.3 0.3   ALT 49 34 30   AST 53* 40 43   PROTTOTAL 5.7* 5.7* 5.4*   ALBUMIN 2.7* 2.5* 2.4*       Iron Panel - No lab results found.      Imaging:  All imaging studies reviewed by me.     Current Medications:   acetaminophen  975 mg Oral or Feeding Tube Q6H    atorvastatin  10 mg Oral or Feeding Tube QPM    hydrocortisone   Topical BID    insulin aspart  1-12 Units Subcutaneous Q4H    magnesium sulfate  2 g Intravenous Once    melatonin  1 mg Oral or Feeding Tube QPM    multivitamins w/minerals  15 mL Per Feeding Tube " Daily    pantoprazole  40 mg Oral or Feeding Tube QAM AC    potassium chloride  40 mEq Oral or Feeding Tube Once    protein modular  1 packet Per Feeding Tube TID    QUEtiapine  75 mg Oral or Feeding Tube At Bedtime    triamcinolone   Topical BID      dextrose      dextrose      heparin 2,050 Units/hr (10/21/23 1100)     Jone Davis MD

## 2023-10-21 NOTE — PROGRESS NOTES
Vascular Surgery Progress Note  10/21/2023       Subjective:  S/p skin closure of the abdominal wound -- still delirious and did not sleep much    Objective:  Temp:  [97.8  F (36.6  C)-98.9  F (37.2  C)] 98.7  F (37.1  C)  Pulse:  [] 115  Resp:  [9-29] 19  BP: ()/(59-98) 109/73  MAP:  [64 mmHg-129 mmHg] 78 mmHg  Arterial Line BP: ()/(54-99) 120/58  FiO2 (%):  [30 %-40 %] 40 %  SpO2:  [90 %-100 %] 97 %    I/O last 3 completed shifts:  In: 2689.14 [I.V.:1429.14; NG/GT:360]  Out: 4345 [Emesis/NG output:1950; Drains:45; Other:2000; Stool:350]      Gen: resting comfortably in bed in ICU, extubated, whispering words with hoarse voice.  CV: off pressors, regular HR per tele   Resp: non-labored, on room air  Abd: Abdominal incision in dressing, FARIDA with serosang output (45 ml/24h), no active bleeding  Ext: RLE wwp, palpable DP pulse, LLE stump with ACE WRAP    Labs:  Recent Labs   Lab 10/21/23  0354 10/20/23  1340 10/20/23  0408 10/19/23  0317   WBC 13.9*  --  14.2* 15.1*   HGB 8.0* 9.0* 7.0* 7.5*     --  147* 132*       Recent Labs   Lab 10/21/23  0846 10/21/23  0401 10/21/23  0354 10/20/23  1224 10/20/23  0408 10/19/23  0841 10/19/23  0317   NA  --   --  133*  --  131*  --  130*   POTASSIUM  --   --  3.8  --  3.9  --  3.6   CHLORIDE  --   --  93*  --  95*  --  94*   CO2  --   --  25  --  17*  --  18*   BUN  --   --  67.9*  --  127.0*  --  80.7*   CR  --   --  3.00*  --  4.95*  --  3.73*   * 146* 160*   < > 141*  144*   < > 204*   OMAR  --   --  8.5*  --  8.5*  --  8.4*   MAG  --   --  2.0  --  2.7*  --  2.0   PHOS  --   --  3.0  --  3.5  --  3.2    < > = values in this interval not displayed.      Imaging  Narrative & Impression   EXAMINATION: CT CHEST/ABDOMEN/PELVIS W CONTRAST, 10/13/2023 11:33 PM     COMPARISON STUDY: Abdominal radiograph 10/10/2023     INDICATION: new desat episode, hypotension, eval ? infectious source     TECHNIQUE: CT scan of the chest, abdomen and pelvis was performed  on  multidetector CT scanner using volumetric acquisition technique and  images were reconstructed in multiple planes with variable thickness  and reviewed on dedicated workstations.      CONTRAST: iopamidol (ISOVUE-370) solution 104 m. Without oral  contrast.     CT scan radiation dose is optimized to minimum requisite dose using  automated dose modulation techniques.     Findings:      Chest:  Lungs: Bilateral small-to-moderate pleural effusions with associated  atelectasis and ground glass opacities, left greater than right.  Basilar predominant interlobular septal thickening. No pneumothorax.     Mediastinum: Heart size is within normal limits. No pericardial  effusion. Aorta and main pulmonary artery caliber are within normal  limits No mediastinal lymphadenopathy. Small volume of air within the  left brachiocephalic vein (series 5, image 33) just prior to entering  the superior vena cava, likely iatrogenic secondary left upper  extremity central venous catheter. Enteric tube coursing the esophagus  terminating in the stomach lumen, otherwise the esophagus is  unremarkable.         Abdomen/Pelvis:    Liver: No mass. Mild intrahepatic biliary dilatation. Hypodensity at  the lateral aspect of the right hepatic lobe measuring up to 11 mm  (series 5, image 103).      Gallbladder/CBD: Mildly distended gallbladder with layering sludge.  Common bile duct within normal limits for patient's age.     Pancreas: Pancreas appears to be normal enhancing with peripancreatic  hypoattenuating collection this primarily hypoattenuating but with  areas of increased hyperattenuation. This suggests peripancreatic  fluid collection with hemorrhage.     Spleen: Surrounded by hypoattenuating fluid, otherwise unremarkable.     Adrenal glands: Normal.     Kidneys/ureters/bladder: Heterogeneous hypoattenuation of the kidneys  bilaterally with wedgelike confluent hypoattenuation in the interpolar  and superior pole of the left kidney  (series series 5, images  127-156). No obstructing renal stone, hydronephrosis, renal masses.      Genitourinary/reproductive tract: Normal bladder. Reproductive organs  are within normal limits.     Gastrointestinal: Hypoattenuating of the large bowel with wall  thickening starting from the rectum and extending to the mid  transverse colon with surrounding fat stranding (160-257). Colonic  diverticulosis. Normal caliber small bowel. Appendix not seen. Stomach  and esophagus are unremarkable.     Vasculature: Postsurgical changes of ruptured AAA open repair with  mixed attenuation clot surrounding the postsurgical repairs. Narrow  lumen inferior vena cava compressed by hematoma at the level of the  kidneys. Small hematoma at the aortic bifurcation. Patent hepatic  portal vein.     Retroperitoneum/peritoneum/mesentery: Ascites throughout the  peritoneal cavity surrounding the liver or spleen, infrarenal aorta  and layering in the pelvis. 2 cm hematoma at the aortic bifurcation. 2  cm hematoma just below the level of the renal veins.     Bones: No acute or aggressive appearing osseous bodies. Ultimately  degenerative changes of the spine.     Soft tissues: Enlarged left iliacus muscle with hypodense  heterogeneous appearance measuring up to 6.6 cm (series 5, image 224).  Diffuse anasarca.                                                                      IMPRESSION:   1.  Enlarged heterogeneously hypoattenuating left illiacus muscle  measuring up to 6.6 cm. Findings may represent developing iliacus  hematoma. Consider CTA abdomen/pelvis to further evaluate for active  bleeding within the hematoma.  2.  Narrow caliber infrahepatic IVC with appearance of compression in  between thrombus secondary to AAA rupture and abdominal aorta;  findings may represent both hypotension and potential compression  secondary to the surrounding hematoma.   3.  Bilateral, left greater than right, wedge-shaped hypodensities  suggesting  infarcts.   4.  Edematous left colon. There appears to be mucosal enhancement. No  evidence of pneumatosis intestinalis or free air in the abdomen.  5.  Pancreas appears perfused but there is peripancreatic fluid with  hemorrhage. No pseudoaneurysm or extravasation identified.   6.  Diffuse intraperitoneal ascites and mixed attenuating hematoma,  along the right inferior renal aorta and aortic bifurcation, likely  related to recent AAA rupture and subsequent repair.  7.  Bilateral pleural effusions with associated atelectasis and  groundglass opacities, likely representing pulmonary edema.  8.  Diffuse anasarca.              [Urgent Result: Suspected left iliac is hematoma measuring up 6.6 cm;  suspected devascularized interpolar and superior left kidney.]     Finding was identified on 10/14/2023 12:09 PM.        Assessment/Plan:   68 year old male with PMH of HTN and previous TIA who presented with R sided abdominal pain found to have contained ruptured AAA, now s/p open AAA repair 9/24 into 9/25am with 30 min supraceliac clamp time, prolonged inter-renal clamp time, temporary abdominal closure. He did have bloody stool postop with flex sig 9/25 demonstrating ischemic mucosal changes of the rectum, repeated abdominal without evidence of transmural necrosis of the small or large intestine, or the rectum. On 9/29 he was started on CRRT given ongoing low UOP and rising Cr and failure of lasix challenge. Now on iHD. Hemodynamically continues to do well off pressors. S/p closure of abdominal fascia and subcutaneous tissue left open with wound VAC applied 10/2/23. With ischemic LLE, AKA deferred until 10/17/2023 due to leukocytosis, hypoxia requiring reintubation (10/13/23) and critical illness. ADDIS on 10/16/23 without evidence of embolic source. 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 S/p 10/20/23 for abdominal incision closure.    Neuro:   - Appears intact: appreciate monitoring neuro status  - Stroke workup negative --  MRI with multiple small infarcts, follow-up with neurology 4-6 weeks after discharge, ok with a/c.   - ADDIS for workup 10/16/23 demonstrated no thromboembolic source or shunt identified in cardiac chambers. Grade IV atheroma in descending aorta and aortic arch   CV:   - Off pressors, midodrine 20mg daily prn with HD runs   - Goal SBP <160, MAP >65  - Labetalol prn for SBP >160  - Continue statin  - PE+, venous duplex with nonocclusive to occlusive thrombus of the left GSV from the level of the knee to the groin and nonocclusive thrombus of the left distal femoral vein and popliteal veins, increased in extent compared to 9/30/2023.   - Therapeutic Heparin gtt   - ADDIS 10/16/23 demonstrated no thromboembolic source or shunt, has grade IV atheroma in descending aorta and aortic arch   Resp:   - No new acute concerns, minimal vent settings  - Vent settings per SICU, wean as tolerated.   - Plan for extubation today  GI:   - Pancreatitis with peripancreatic fluid collection on CT with question of bleeding into the collection.  - Do not expect this anterior location of pancreatic fluid to be related to surgical procedure as we were in the RP and did see inferior/posterior aspect of pancreas. Fluid collection is not surrounding aortic graft, this is reassuring.  - GI signed off as collection not drainable  - TF resumed, increasing goal, tolerating  - Continue TPN   - Change abd dressing tomorrow  FEN:   - Electrolyte replacement prn   Renal:   - Appreciate nephrology recommendations  - Continue iHD  - Tunneled line with IR placed 10/18/2023  Heme:   - Hgb 8 this morning, CTM  - DVT as above, PE   - Therapeutic heparin gtt   - PT/OT ROM  ID:   - known aspiration event 10/9   - monitor signs of pneumonia  - nystatin swish for oral candida  - improving leukocytosis, downtrending, monitor for now.   MSK:   - L AKA 10/17/23   Derm:   - Has rash on abdomen, trunk, anterior bilateral thighs, erythematous, blanching, however  maculopapular does not seem consistent with cellulitis  - Pharmacy reviewed medications for possible causes of rash and felt all were relatively low risk however not 0 risk   - benadryl cream topically applied with no improvement  - trialed abd binder off for short time to see if improvement (?moisture rash), none seen.   - Appreciate dermatology recs, likely morbilliform eruption vs. Low concern for possible DRESS   - CBC with diff daily    - LFTs daily    - triamcinolone 0.1% twice daily to areas w/ rash    - switched cefepime to levofloxacin  Misc:   - abd binder on at all times.  - Heparin gtt    Discussed patient with Dr. Lowe, vascular surgery staff

## 2023-10-22 ENCOUNTER — APPOINTMENT (OUTPATIENT)
Dept: GENERAL RADIOLOGY | Facility: CLINIC | Age: 68
DRG: 268 | End: 2023-10-22
Payer: COMMERCIAL

## 2023-10-22 ENCOUNTER — APPOINTMENT (OUTPATIENT)
Dept: GENERAL RADIOLOGY | Facility: CLINIC | Age: 68
DRG: 268 | End: 2023-10-22
Attending: PHARMACIST
Payer: COMMERCIAL

## 2023-10-22 ENCOUNTER — APPOINTMENT (OUTPATIENT)
Dept: SPEECH THERAPY | Facility: CLINIC | Age: 68
DRG: 268 | End: 2023-10-22
Payer: COMMERCIAL

## 2023-10-22 LAB
ALBUMIN SERPL BCG-MCNC: 3.1 G/DL (ref 3.5–5.2)
ALP SERPL-CCNC: 110 U/L (ref 40–129)
ALT SERPL W P-5'-P-CCNC: 31 U/L (ref 0–70)
ANION GAP SERPL CALCULATED.3IONS-SCNC: 17 MMOL/L (ref 7–15)
AST SERPL W P-5'-P-CCNC: 30 U/L (ref 0–45)
BASE EXCESS BLDV CALC-SCNC: 7.5 MMOL/L (ref -7.7–1.9)
BASO+EOS+MONOS # BLD AUTO: ABNORMAL 10*3/UL
BASO+EOS+MONOS NFR BLD AUTO: ABNORMAL %
BASOPHILS # BLD AUTO: ABNORMAL 10*3/UL
BASOPHILS # BLD MANUAL: 0.1 10E3/UL (ref 0–0.2)
BASOPHILS NFR BLD AUTO: ABNORMAL %
BASOPHILS NFR BLD MANUAL: 1 %
BILIRUB DIRECT SERPL-MCNC: 0.25 MG/DL (ref 0–0.3)
BILIRUB SERPL-MCNC: 0.4 MG/DL
BUN SERPL-MCNC: 101 MG/DL (ref 8–23)
CA-I BLD-MCNC: 4.3 MG/DL (ref 4.4–5.2)
CALCIUM SERPL-MCNC: 8.6 MG/DL (ref 8.8–10.2)
CHLORIDE SERPL-SCNC: 93 MMOL/L (ref 98–107)
CREAT SERPL-MCNC: 4.4 MG/DL (ref 0.67–1.17)
DEPRECATED HCO3 PLAS-SCNC: 27 MMOL/L (ref 22–29)
EGFRCR SERPLBLD CKD-EPI 2021: 14 ML/MIN/1.73M2
EOSINOPHIL # BLD AUTO: ABNORMAL 10*3/UL
EOSINOPHIL # BLD MANUAL: 2 10E3/UL (ref 0–0.7)
EOSINOPHIL NFR BLD AUTO: ABNORMAL %
EOSINOPHIL NFR BLD MANUAL: 18 %
ERYTHROCYTE [DISTWIDTH] IN BLOOD BY AUTOMATED COUNT: 18.1 % (ref 10–15)
GLUCOSE BLDC GLUCOMTR-MCNC: 124 MG/DL (ref 70–99)
GLUCOSE BLDC GLUCOMTR-MCNC: 128 MG/DL (ref 70–99)
GLUCOSE BLDC GLUCOMTR-MCNC: 138 MG/DL (ref 70–99)
GLUCOSE BLDC GLUCOMTR-MCNC: 151 MG/DL (ref 70–99)
GLUCOSE BLDC GLUCOMTR-MCNC: 159 MG/DL (ref 70–99)
GLUCOSE SERPL-MCNC: 129 MG/DL (ref 70–99)
HCO3 BLDV-SCNC: 32 MMOL/L (ref 21–28)
HCT VFR BLD AUTO: 21.7 % (ref 40–53)
HGB BLD-MCNC: 7.1 G/DL (ref 13.3–17.7)
HGB BLD-MCNC: 7.8 G/DL (ref 13.3–17.7)
IMM GRANULOCYTES # BLD: ABNORMAL 10*3/UL
IMM GRANULOCYTES NFR BLD: ABNORMAL %
LACTATE SERPL-SCNC: 1.9 MMOL/L (ref 0.7–2)
LYMPHOCYTES # BLD AUTO: ABNORMAL 10*3/UL
LYMPHOCYTES # BLD MANUAL: 0.5 10E3/UL (ref 0.8–5.3)
LYMPHOCYTES NFR BLD AUTO: ABNORMAL %
LYMPHOCYTES NFR BLD MANUAL: 4 %
MAGNESIUM SERPL-MCNC: 2.8 MG/DL (ref 1.7–2.3)
MCH RBC QN AUTO: 30.6 PG (ref 26.5–33)
MCHC RBC AUTO-ENTMCNC: 32.7 G/DL (ref 31.5–36.5)
MCV RBC AUTO: 94 FL (ref 78–100)
METAMYELOCYTES # BLD MANUAL: 0.2 10E3/UL
METAMYELOCYTES NFR BLD MANUAL: 2 %
MONOCYTES # BLD AUTO: ABNORMAL 10*3/UL
MONOCYTES # BLD MANUAL: 0.1 10E3/UL (ref 0–1.3)
MONOCYTES NFR BLD AUTO: ABNORMAL %
MONOCYTES NFR BLD MANUAL: 1 %
NEUTROPHILS # BLD AUTO: ABNORMAL 10*3/UL
NEUTROPHILS # BLD MANUAL: 8.4 10E3/UL (ref 1.6–8.3)
NEUTROPHILS NFR BLD AUTO: ABNORMAL %
NEUTROPHILS NFR BLD MANUAL: 74 %
NRBC # BLD AUTO: 0 10E3/UL
NRBC BLD AUTO-RTO: 0 /100
O2/TOTAL GAS SETTING VFR VENT: 21 %
OXYHGB MFR BLDV: 60 % (ref 70–75)
PCO2 BLDV: 43 MM HG (ref 40–50)
PH BLDV: 7.48 [PH] (ref 7.32–7.43)
PHOSPHATE SERPL-MCNC: 3.4 MG/DL (ref 2.5–4.5)
PLAT MORPH BLD: ABNORMAL
PLATELET # BLD AUTO: 167 10E3/UL (ref 150–450)
PO2 BLDV: 32 MM HG (ref 25–47)
POTASSIUM SERPL-SCNC: 4.2 MMOL/L (ref 3.4–5.3)
PROT SERPL-MCNC: 5.7 G/DL (ref 6.4–8.3)
RBC # BLD AUTO: 2.32 10E6/UL (ref 4.4–5.9)
RBC MORPH BLD: ABNORMAL
SODIUM SERPL-SCNC: 137 MMOL/L (ref 135–145)
UFH PPP CHRO-ACNC: 0.2 IU/ML
UFH PPP CHRO-ACNC: 0.46 IU/ML
UFH PPP CHRO-ACNC: 0.59 IU/ML
WBC # BLD AUTO: 11.3 10E3/UL (ref 4–11)

## 2023-10-22 PROCEDURE — 82310 ASSAY OF CALCIUM: CPT | Performed by: SURGERY

## 2023-10-22 PROCEDURE — 999N000065 XR ABDOMEN PORT 1 VIEW

## 2023-10-22 PROCEDURE — 36415 COLL VENOUS BLD VENIPUNCTURE: CPT

## 2023-10-22 PROCEDURE — 82330 ASSAY OF CALCIUM: CPT | Performed by: PHYSICIAN ASSISTANT

## 2023-10-22 PROCEDURE — 250N000011 HC RX IP 250 OP 636: Mod: JZ

## 2023-10-22 PROCEDURE — 84100 ASSAY OF PHOSPHORUS: CPT | Performed by: PHYSICIAN ASSISTANT

## 2023-10-22 PROCEDURE — 92526 ORAL FUNCTION THERAPY: CPT | Mod: GN

## 2023-10-22 PROCEDURE — 74018 RADEX ABDOMEN 1 VIEW: CPT | Mod: 26 | Performed by: RADIOLOGY

## 2023-10-22 PROCEDURE — 200N000002 HC R&B ICU UMMC

## 2023-10-22 PROCEDURE — 99233 SBSQ HOSP IP/OBS HIGH 50: CPT | Performed by: STUDENT IN AN ORGANIZED HEALTH CARE EDUCATION/TRAINING PROGRAM

## 2023-10-22 PROCEDURE — 85520 HEPARIN ASSAY: CPT

## 2023-10-22 PROCEDURE — 250N000009 HC RX 250

## 2023-10-22 PROCEDURE — 85018 HEMOGLOBIN: CPT

## 2023-10-22 PROCEDURE — 99231 SBSQ HOSP IP/OBS SF/LOW 25: CPT | Mod: GC | Performed by: SURGERY

## 2023-10-22 PROCEDURE — 258N000001 HC RX 258: Performed by: PHARMACIST

## 2023-10-22 PROCEDURE — 85520 HEPARIN ASSAY: CPT | Performed by: SURGERY

## 2023-10-22 PROCEDURE — 80076 HEPATIC FUNCTION PANEL: CPT

## 2023-10-22 PROCEDURE — 85007 BL SMEAR W/DIFF WBC COUNT: CPT

## 2023-10-22 PROCEDURE — 85027 COMPLETE CBC AUTOMATED: CPT

## 2023-10-22 PROCEDURE — 83735 ASSAY OF MAGNESIUM: CPT | Performed by: PHYSICIAN ASSISTANT

## 2023-10-22 PROCEDURE — 82805 BLOOD GASES W/O2 SATURATION: CPT | Performed by: STUDENT IN AN ORGANIZED HEALTH CARE EDUCATION/TRAINING PROGRAM

## 2023-10-22 PROCEDURE — 83605 ASSAY OF LACTIC ACID: CPT | Performed by: PHYSICIAN ASSISTANT

## 2023-10-22 PROCEDURE — 250N000013 HC RX MED GY IP 250 OP 250 PS 637: Performed by: SURGERY

## 2023-10-22 PROCEDURE — 250N000013 HC RX MED GY IP 250 OP 250 PS 637

## 2023-10-22 RX ORDER — QUETIAPINE FUMARATE 25 MG/1
25 TABLET, FILM COATED ORAL EVERY 6 HOURS PRN
Status: DISCONTINUED | OUTPATIENT
Start: 2023-10-22 | End: 2023-10-31

## 2023-10-22 RX ORDER — LIDOCAINE HYDROCHLORIDE 10 MG/ML
5 INJECTION, SOLUTION EPIDURAL; INFILTRATION; INTRACAUDAL; PERINEURAL ONCE
Status: COMPLETED | OUTPATIENT
Start: 2023-10-22 | End: 2023-10-22

## 2023-10-22 RX ORDER — CETIRIZINE HYDROCHLORIDE 5 MG/1
5 TABLET ORAL DAILY PRN
Status: DISCONTINUED | OUTPATIENT
Start: 2023-10-22 | End: 2023-11-17 | Stop reason: HOSPADM

## 2023-10-22 RX ORDER — LIDOCAINE HYDROCHLORIDE 10 MG/ML
INJECTION, SOLUTION EPIDURAL; INFILTRATION; INTRACAUDAL; PERINEURAL
Status: DISCONTINUED
Start: 2023-10-22 | End: 2023-10-23 | Stop reason: HOSPADM

## 2023-10-22 RX ADMIN — OXYCODONE HYDROCHLORIDE 5 MG: 5 TABLET ORAL at 16:41

## 2023-10-22 RX ADMIN — HYDROCORTISONE: 25 CREAM TOPICAL at 20:29

## 2023-10-22 RX ADMIN — Medication 1 MG: at 20:55

## 2023-10-22 RX ADMIN — OXYCODONE HYDROCHLORIDE 5 MG: 5 TABLET ORAL at 07:58

## 2023-10-22 RX ADMIN — HEPARIN SODIUM 2200 UNITS/HR: 10000 INJECTION, SOLUTION INTRAVENOUS at 08:03

## 2023-10-22 RX ADMIN — HYDROCORTISONE: 25 CREAM TOPICAL at 07:59

## 2023-10-22 RX ADMIN — ACETAMINOPHEN 975 MG: 325 TABLET, FILM COATED ORAL at 16:06

## 2023-10-22 RX ADMIN — TRIAMCINOLONE ACETONIDE: 1 OINTMENT TOPICAL at 20:29

## 2023-10-22 RX ADMIN — Medication 40 MG: at 07:59

## 2023-10-22 RX ADMIN — DEXTROSE MONOHYDRATE: 100 INJECTION, SOLUTION INTRAVENOUS at 18:40

## 2023-10-22 RX ADMIN — INSULIN ASPART 1 UNITS: 100 INJECTION, SOLUTION INTRAVENOUS; SUBCUTANEOUS at 13:37

## 2023-10-22 RX ADMIN — QUETIAPINE FUMARATE 75 MG: 50 TABLET ORAL at 21:01

## 2023-10-22 RX ADMIN — LIDOCAINE HYDROCHLORIDE 5 ML: 10 INJECTION, SOLUTION EPIDURAL; INFILTRATION; INTRACAUDAL; PERINEURAL at 16:06

## 2023-10-22 RX ADMIN — ATORVASTATIN CALCIUM 10 MG: 10 TABLET, FILM COATED ORAL at 20:55

## 2023-10-22 RX ADMIN — Medication 15 ML: at 07:58

## 2023-10-22 RX ADMIN — ACETAMINOPHEN 975 MG: 325 TABLET, FILM COATED ORAL at 20:55

## 2023-10-22 RX ADMIN — ACETAMINOPHEN 975 MG: 325 TABLET, FILM COATED ORAL at 04:33

## 2023-10-22 RX ADMIN — TRIAMCINOLONE ACETONIDE: 1 OINTMENT TOPICAL at 07:59

## 2023-10-22 RX ADMIN — INSULIN ASPART 1 UNITS: 100 INJECTION, SOLUTION INTRAVENOUS; SUBCUTANEOUS at 08:03

## 2023-10-22 RX ADMIN — HEPARIN SODIUM 2200 UNITS/HR: 10000 INJECTION, SOLUTION INTRAVENOUS at 20:53

## 2023-10-22 RX ADMIN — ACETAMINOPHEN 975 MG: 325 TABLET, FILM COATED ORAL at 10:16

## 2023-10-22 ASSESSMENT — ACTIVITIES OF DAILY LIVING (ADL)
ADLS_ACUITY_SCORE: 51

## 2023-10-22 NOTE — PROGRESS NOTES
Admitted/transferred from: 4E  Reason for admission/transfer: Lateral transfer  2 RN skin assessment: completed by ARACELY Liz. ARACELY Hahn  Result of skin assessment and interventions/actions: See flowsheet  Height, weight, drug calc weight: Done  Patient belongings (see Flowsheet)  MDRO education added to care planN/A  ?

## 2023-10-22 NOTE — PROGRESS NOTES
Vascular Surgery Progress Note  10/22/2023       Subjective:  Hgb in 7s today. HDS. Conversant today. No obvious complaints.    Objective:  Temp:  [98.1  F (36.7  C)-98.6  F (37  C)] 98.1  F (36.7  C)  Pulse:  [] 108  Resp:  [10-25] 16  BP: (131-153)/(80-97) 145/97  MAP:  [78 mmHg-107 mmHg] 99 mmHg  Arterial Line BP: (121-163)/(56-77) 145/72  SpO2:  [92 %-100 %] 94 %    I/O last 3 completed shifts:  In: 2262.38 [I.V.:481.38; NG/GT:631]  Out: 1730 [Emesis/NG output:1050; Drains:30; Stool:650]      Gen: resting comfortably in bed in ICU, extubated, whispering words with hoarse voice.  CV: off pressors, regular HR per tele   Resp: non-labored, on room air  Abd: Abdominal incision in dressing, FARIDA with serosang output (60 ml/24h), no active bleeding  Ext: RLE wwp, palpable DP pulse, LLE stump with surrounding erythema, slightly firm on the bottom side, no obvious bleeding    Labs:  Recent Labs   Lab 10/22/23  1309 10/22/23  0422 10/21/23  0354 10/20/23  1340 10/20/23  0408   WBC  --  11.3* 13.9*  --  14.2*   HGB 7.8* 7.1* 8.0*   < > 7.0*   PLT  --  167 159  --  147*    < > = values in this interval not displayed.       Recent Labs   Lab 10/22/23  0802 10/22/23  0427 10/22/23  0422 10/21/23  0401 10/21/23  0354 10/20/23  1224 10/20/23  0408   NA  --   --  137  --  133*  --  131*   POTASSIUM  --   --  4.2  --  3.8  --  3.9   CHLORIDE  --   --  93*  --  93*  --  95*   CO2  --   --  27  --  25  --  17*   BUN  --   --  101.0*  --  67.9*  --  127.0*   CR  --   --  4.40*  --  3.00*  --  4.95*   * 124* 129*   < > 160*   < > 141*  144*   OMAR  --   --  8.6*  --  8.5*  --  8.5*   MAG  --   --  2.8*  --  2.0  --  2.7*   PHOS  --   --  3.4  --  3.0  --  3.5    < > = values in this interval not displayed.      Imaging  Narrative & Impression   EXAMINATION: CT CHEST/ABDOMEN/PELVIS W CONTRAST, 10/13/2023 11:33 PM     COMPARISON STUDY: Abdominal radiograph 10/10/2023     INDICATION: new desat episode, hypotension, eval ?  infectious source     TECHNIQUE: CT scan of the chest, abdomen and pelvis was performed on  multidetector CT scanner using volumetric acquisition technique and  images were reconstructed in multiple planes with variable thickness  and reviewed on dedicated workstations.      CONTRAST: iopamidol (ISOVUE-370) solution 104 m. Without oral  contrast.     CT scan radiation dose is optimized to minimum requisite dose using  automated dose modulation techniques.     Findings:      Chest:  Lungs: Bilateral small-to-moderate pleural effusions with associated  atelectasis and ground glass opacities, left greater than right.  Basilar predominant interlobular septal thickening. No pneumothorax.     Mediastinum: Heart size is within normal limits. No pericardial  effusion. Aorta and main pulmonary artery caliber are within normal  limits No mediastinal lymphadenopathy. Small volume of air within the  left brachiocephalic vein (series 5, image 33) just prior to entering  the superior vena cava, likely iatrogenic secondary left upper  extremity central venous catheter. Enteric tube coursing the esophagus  terminating in the stomach lumen, otherwise the esophagus is  unremarkable.         Abdomen/Pelvis:    Liver: No mass. Mild intrahepatic biliary dilatation. Hypodensity at  the lateral aspect of the right hepatic lobe measuring up to 11 mm  (series 5, image 103).      Gallbladder/CBD: Mildly distended gallbladder with layering sludge.  Common bile duct within normal limits for patient's age.     Pancreas: Pancreas appears to be normal enhancing with peripancreatic  hypoattenuating collection this primarily hypoattenuating but with  areas of increased hyperattenuation. This suggests peripancreatic  fluid collection with hemorrhage.     Spleen: Surrounded by hypoattenuating fluid, otherwise unremarkable.     Adrenal glands: Normal.     Kidneys/ureters/bladder: Heterogeneous hypoattenuation of the kidneys  bilaterally with wedgelike  confluent hypoattenuation in the interpolar  and superior pole of the left kidney (series series 5, images  127-156). No obstructing renal stone, hydronephrosis, renal masses.      Genitourinary/reproductive tract: Normal bladder. Reproductive organs  are within normal limits.     Gastrointestinal: Hypoattenuating of the large bowel with wall  thickening starting from the rectum and extending to the mid  transverse colon with surrounding fat stranding (160-257). Colonic  diverticulosis. Normal caliber small bowel. Appendix not seen. Stomach  and esophagus are unremarkable.     Vasculature: Postsurgical changes of ruptured AAA open repair with  mixed attenuation clot surrounding the postsurgical repairs. Narrow  lumen inferior vena cava compressed by hematoma at the level of the  kidneys. Small hematoma at the aortic bifurcation. Patent hepatic  portal vein.     Retroperitoneum/peritoneum/mesentery: Ascites throughout the  peritoneal cavity surrounding the liver or spleen, infrarenal aorta  and layering in the pelvis. 2 cm hematoma at the aortic bifurcation. 2  cm hematoma just below the level of the renal veins.     Bones: No acute or aggressive appearing osseous bodies. Ultimately  degenerative changes of the spine.     Soft tissues: Enlarged left iliacus muscle with hypodense  heterogeneous appearance measuring up to 6.6 cm (series 5, image 224).  Diffuse anasarca.                                                                      IMPRESSION:   1.  Enlarged heterogeneously hypoattenuating left illiacus muscle  measuring up to 6.6 cm. Findings may represent developing iliacus  hematoma. Consider CTA abdomen/pelvis to further evaluate for active  bleeding within the hematoma.  2.  Narrow caliber infrahepatic IVC with appearance of compression in  between thrombus secondary to AAA rupture and abdominal aorta;  findings may represent both hypotension and potential compression  secondary to the surrounding hematoma.    3.  Bilateral, left greater than right, wedge-shaped hypodensities  suggesting infarcts.   4.  Edematous left colon. There appears to be mucosal enhancement. No  evidence of pneumatosis intestinalis or free air in the abdomen.  5.  Pancreas appears perfused but there is peripancreatic fluid with  hemorrhage. No pseudoaneurysm or extravasation identified.   6.  Diffuse intraperitoneal ascites and mixed attenuating hematoma,  along the right inferior renal aorta and aortic bifurcation, likely  related to recent AAA rupture and subsequent repair.  7.  Bilateral pleural effusions with associated atelectasis and  groundglass opacities, likely representing pulmonary edema.  8.  Diffuse anasarca.              [Urgent Result: Suspected left iliac is hematoma measuring up 6.6 cm;  suspected devascularized interpolar and superior left kidney.]     Finding was identified on 10/14/2023 12:09 PM.        Assessment/Plan:   68 year old male with PMH of HTN and previous TIA who presented with R sided abdominal pain found to have contained ruptured AAA, now s/p open AAA repair 9/24 into 9/25am with 30 min supraceliac clamp time, prolonged inter-renal clamp time, temporary abdominal closure. He did have bloody stool postop with flex sig 9/25 demonstrating ischemic mucosal changes of the rectum, repeated abdominal without evidence of transmural necrosis of the small or large intestine, or the rectum. On 9/29 he was started on CRRT given ongoing low UOP and rising Cr and failure of lasix challenge. Now on iHD. Hemodynamically continues to do well off pressors. S/p closure of abdominal fascia and subcutaneous tissue left open with wound VAC applied 10/2/23. With ischemic LLE, AKA deferred until 10/17/2023 due to leukocytosis, hypoxia requiring reintubation (10/13/23) and critical illness. ADDIS on 10/16/23 without evidence of embolic source. 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 S/p 10/20/23 for abdominal incision closure.    Neuro:   -  Appears intact: appreciate monitoring neuro status  - Stroke workup negative -- MRI with multiple small infarcts, follow-up with neurology 4-6 weeks after discharge, ok with a/c.   - ADDIS for workup 10/16/23 demonstrated no thromboembolic source or shunt identified in cardiac chambers. Grade IV atheroma in descending aorta and aortic arch   CV:   - Off pressors, midodrine 20mg daily prn with HD runs   - Goal SBP <160, MAP >65  - Labetalol prn for SBP >160  - Continue statin  - PE+, venous duplex with nonocclusive to occlusive thrombus of the left GSV from the level of the knee to the groin and nonocclusive thrombus of the left distal femoral vein and popliteal veins, increased in extent compared to 9/30/2023.   - Therapeutic Heparin gtt   - ADDIS 10/16/23 demonstrated no thromboembolic source or shunt, has grade IV atheroma in descending aorta and aortic arch   Resp:   - No new acute concerns, minimal vent settings  - Vent settings per SICU, wean as tolerated.   - Plan for extubation today  GI:   - Pancreatitis with peripancreatic fluid collection on CT with question of bleeding into the collection.  - Do not expect this anterior location of pancreatic fluid to be related to surgical procedure as we were in the RP and did see inferior/posterior aspect of pancreas. Fluid collection is not surrounding aortic graft, this is reassuring.  - GI signed off as collection not drainable  - TF resumed, increasing goal, tolerating  - Continue TPN   - Change abd dressing tomorrow  FEN:   - Electrolyte replacement prn   Renal:   - Appreciate nephrology recommendations  - Continue iHD  - Tunneled line with IR placed 10/18/2023  Heme:   - Hgb 7 this morning, CTM  - DVT as above, PE   - Therapeutic heparin gtt   - PT/OT ROM  ID:   - known aspiration event 10/9   - monitor signs of pneumonia  - nystatin swish for oral candida  - improving leukocytosis, downtrending, monitor for now.   MSK:   - L AKA 10/17/23   Derm:   - Has rash on  abdomen, trunk, anterior bilateral thighs, erythematous, blanching, however maculopapular does not seem consistent with cellulitis  - Pharmacy reviewed medications for possible causes of rash and felt all were relatively low risk however not 0 risk   - benadryl cream topically applied with no improvement  - trialed abd binder off for short time to see if improvement (?moisture rash), none seen.   - Appreciate dermatology recs, likely morbilliform eruption vs. Low concern for possible DRESS   - CBC with diff daily    - LFTs daily    - triamcinolone 0.1% twice daily to areas w/ rash    - switched cefepime to levofloxacin  Misc:   - abd binder on at all times.  - Heparin gtt    Discussed patient with Dr. Lowe, vascular surgery staff

## 2023-10-22 NOTE — PROGRESS NOTES
SURGICAL ICU PROGRESS NOTE  10/22/2023        Date of Service (when I saw the patient): 10/22/2023    ASSESSMENT:  Alfredo Burnham is a 68 year old male who was admitted to the SICU on 9/24/2023 s/p open AAA repair. Patient has a history of HTN and previous TIA. He presented to the ED on 9/24 with right sided abdominal pain and was found to have a contained, ruptured AAA on CT. 30 min super-celiac clamp time. Prolonged intra-renal clamp. 9/25 s/p flex sig showing rectal ischemia, abdominal washout showing a hemostatic AAA graft and no evidence of transmural colonic or small bowel ischemia, and an unsuccessful LLE embolectomy. 9/26 s/p repeat abdominal washout, retroperitoneal closure, LLE wound washout. 9/29 s/p repeat abd washout, bowel appeared well perfused w/o notable ischemia. OR 10/2 for abdominal fascia closure. Extubated 10/4. Unequal pupils and R facial droop, MRI brain showed multiple small infarcts, likely embolic. Patient was transferred out of the surgical ICU on 10/12. On 10/13, patient completed dialysis run and suddenly started experiencing desaturation. Patient was emergently intubated for Acute Hypoxic Respiratory Failure in dialysis unit and transferred to SICU for further cares. L AKA 10/18 with abd wound vac exchange. OR on 10/20 for primary skin closure. Patient admitted to ICU for intubation and sedation management.     CHANGES and MAJOR THINGS TODAY:   - Ongoing bilious NG output, continue LIS for NG  - Hemoglobin downtrended, repeat for 2PM  - Oozing around tunneled dialysis line, IR paged  - Continue NPO due to aspiration risk per SLP  - Replaced K, K goal >4  - Evening Seroquel changed to 75qhs  - Continue precedex straight rate at bedtime  - Okay for floor when able.     PLAN:  Neurological:  # Sedation  RTOR 10/20 pt returned on propofol 40, plan to wean   - Will continue precedex only at bedtime for delirium    # Acute pain  RAPS femoral nerve block 10/18  - Multi-modal pain  control effective (scheduled tylenol, oxycodone PRN, dilaudid PRN, robaxin 500 mg TID PRN).      #Facial droop, left lacunar infarct  #Punctate lesions of cerebellum   - Follow-up with stroke neurology 4-6 weeks after discharge  - ADDIS with no evidence of embolic source     # Delirium, ongoing  # Mixed metabolic encephalopathy   # Sedation, RASS goal 0 to -1  - Monitor neurological status. Delirium preventions and precautions  - Seroquel 75 mg at bedtime, precedex overnight     Pulmonary:  # Acute hypoxic respiratory failure  # Re-intubation 10/13 (after prev extubation 10/3)  # Pulmonary embolism   Satting well overnight, %.   Extubated, doing well. Tolerating 4L NC to RA    Cardiovascular:    # Contained AAA rupture s/p open repair 9/25  # Acute critical limb ischaemia of LLE  # Hx of HTN  # Tachycardia  # L iliacus hematoma  # Peripancreatic hematoma   - Continue to monitor hemodynamic status; Goal MAP >65, SBP <160  - Heparin gtt continuing  - Continue Lipitor 10 mg     GI/Nutrition:    # s/p open AAA repair, wound vac device in place  # Post-operative melena, resolved  # Rectal ischemia, concern for, resolved  # Aspiration event 10/9  - BR with senna BID and miralax daily  - TPN discontinued yesterday    - ND tube placed, TF at goal   - PPI readded given high NG output, continue to monitor  - Monitor electrolytes, replaced K and Mag  - continue NPO status per SLP, will continue to assess, appreciate assistance     Renal/Fluids/Electrolytes:  # Protein calorie deficit malnutrition, risk of   # Acute kidney injury  # Oliguria  # Haemodialysis  #Hyponatremia   - HD per nephrology, w/ tunneled dialysis line  - Strict intake and output  - Monitor electrolytes and replace, high protocol for Mag, Phos and K    Endocrine:  # Stress hyperglycaemia  - High SSI for glucose control  - q6h BG checks     Infectious disease:   # oropharyngeal candidiasis  # Likely aspiration event during ADDIS  WBC up trending at 15.3.  Sputum culture collected, growing 1+ GPCs.  - Completed course of levaquin (transitioned from Ceftriaxone)    Hematology:    # Acute blood loss anemia  - Repeat Hb check daily  - Oozing around tunneled dialysis line, IR notified.  - Heparin gtt continued    MSK:  # Weakness and deconditioning of critical illness  # Left lower limb ischemia   - Passive ROM at bedside with RN  Marisol WINSLOW completed Tuesday 10/17  - Physical therapy when possible     Skin:  #Likely Spongotic Dermatitis  Rash present since ~10/13 on abdomen, trunk, thighs. No medications per pharm thought to be the cause. Derm evaluated and felt it was a morbilliform eruption vs less likely DRESS syndrome.   - CBC with differential daily  - LFTs daily  - HHV-6 (-) and EBV (+), no indication for further management at this time   - Continue triamcinolone 0.1% ointment, hydrocortisone cream twice daily to all body areas with rash. Zyrtec 10 mg q12, benadryl TID PRN.   - Monitor closely for progression or change in skin findings.  - Consider peripheral smear to assess for reactive lymphocytes  - Wound on left cheek - seen by [facility]    General Cares/Prophylaxis:    DVT Prophylaxis: Pneumatic Compression Devices and heparin gtt   GI Prophylaxis: PPI  Restraints: Restraints for medical healing needed: YES     Lines/ tubes/ drains:  -R tunneled HD line  -L subclavian triple lumen (can be removed)  -NDT  -NGT  -PIVs in RUE    Disposition:  - Recommend transfer to floor when able.     Patient seen, findings and plan discussed with surgical ICU staff, Dr. Mccall.    Clint Braga MD  Surgical ICU  ====================================  INTERVAL HISTORY:   Overnight events reviewed. Doing well. Ongoing delirium. Ongoing dialysis needs.     ROS negative other than previously mentioned.       OBJECTIVE:   1. VITAL SIGNS:   Temp:  [98.4  F (36.9  C)-99.2  F (37.3  C)] 98.6  F (37  C)  Pulse:  [105-116] 110  Resp:  [10-29] 12  MAP:  [78 mmHg-107 mmHg] 107 mmHg  Arterial Line BP:  (121-167)/(56-77) 156/77  SpO2:  [92 %-100 %] 96 %  Resp: 12      2. INTAKE/ OUTPUT:   I/O last 3 completed shifts:  In: 2543 [I.V.:542; NG/GT:601]  Out: 2165 [Emesis/NG output:1700; Drains:15; Stool:450]    3. PHYSICAL EXAMINATION:  General: Comfortable on RA. Speech is soft and gargled.    HEENT: normocephalic,atraumatic  Neuro: opens eyes to command, moves all extremities equally. Oriented to self.  Pulm/Resp: Equal chest rise, no respiratory distress  CV: hypertensive, off pressors  Abdomen: Abdomen mildly distended. Midline laparotomy incision. Dressing in place, CDI. abdominal binder in place.  Gu: decreased scrotal swelling.  MSK/Extremities: L AKA wrapped and dressed without strikethrough    4. INVESTIGATIONS:   Arterial Blood Gases   No lab results found in last 7 days.    Complete Blood Count   Recent Labs   Lab 10/22/23  0422 10/21/23  0354 10/20/23  1340 10/20/23  0408 10/19/23  0317   WBC 11.3* 13.9*  --  14.2* 15.1*   HGB 7.1* 8.0* 9.0* 7.0* 7.5*    159  --  147* 132*     Basic Metabolic Panel  Recent Labs   Lab 10/22/23  0802 10/22/23  0427 10/22/23  0422 10/21/23  2038 10/21/23  0401 10/21/23  0354 10/20/23  1224 10/20/23  0408 10/19/23  0841 10/19/23  0317   NA  --   --  137  --   --  133*  --  131*  --  130*   POTASSIUM  --   --  4.2  --   --  3.8  --  3.9  --  3.6   CHLORIDE  --   --  93*  --   --  93*  --  95*  --  94*   CO2  --   --  27  --   --  25  --  17*  --  18*   BUN  --   --  101.0*  --   --  67.9*  --  127.0*  --  80.7*   CR  --   --  4.40*  --   --  3.00*  --  4.95*  --  3.73*   * 124* 129* 134*   < > 160*   < > 141*  144*   < > 204*    < > = values in this interval not displayed.     Liver Function Tests  Recent Labs   Lab 10/22/23  0422 10/21/23  0354 10/20/23  0408 10/19/23  0317 10/17/23  0335 10/16/23  0431   AST 30 53* 40 43   < > 59*   ALT 31 49 34 30   < > 33   ALKPHOS 110 133* 106 103   < > 82   BILITOTAL 0.4 0.4 0.3 0.3   < > 0.4   ALBUMIN 3.1* 2.7* 2.5* 2.4*    < > 2.5*   INR  --   --   --   --   --  1.32*    < > = values in this interval not displayed.     Pancreatic Enzymes  No lab results found in last 7 days.    Coagulation Profile  Recent Labs   Lab 10/16/23  0655 10/16/23  0431   INR  --  1.32*   PTT 50*  --          5. RADIOLOGY:   No results found for this or any previous visit (from the past 24 hour(s)).

## 2023-10-22 NOTE — PLAN OF CARE
Neuro: Alert/restless throughout the day. Patient O to self. +CAM. 4+ strength in BUE, 3+ RLE. Perrl. Mitts in place for patient safety r/t attempting to pull at NG/NJ. Reminders given and mitt breaks frequently given throughout the day. Mech lift to chair x2, tolerated for 3hrs.   Pain: Patient stated he had no pain and until 1500 when patient stated he had pain in his L leg. Dilaudid given once. Scheduled tylenol given.  Cardiac: ST, -120s w/ occasional PVC. SBP <160, 120-150/80 throughout the day. T-max 99.5 axillary.   Resp: RA, diminished lung sounds.   GI: NPO, seen by speech where patient failed swallow eval. Voice hoarse/whispers. NG-LIS w/ large output (>3L) brown/blood-tinged. NJ w/ TF at 50ml/hr (goal) with q4h 30ml free water flush. Rectal tube in place with 100ml brown/liquid output. Small-moderate amt of leakage around tube depending upon patient positioning.   : Anuric, HD last 10/20 (2L removed).   LADs: R Subclavian HD, L Subclavian CVC, PIV x2, Abdominal FARIDA (10ml sang output)   Gtts:  -Heparin gtt at 2050u/hr (10a therapeutic x2)  Integ:  - Generalized rash throughout body (derm consulted), dark pink, continued triamcinolone BID  -L knee amputation site - Dried drainage on kerlix, MD notified. Vascular MD plans to change 10/22  -Midline abdomen CDI, no drainage w/ binder in place  Labs:  - Lactic acid rechecked at 1800 post 200ml 25% albumin and lyte replacement. Recheck -- 2.2. Additional 250ml 5% albumin to be given.  Tests/Procedures: N/A  Plan: Continue to monitor and encourage activity as able.

## 2023-10-22 NOTE — PROGRESS NOTES
ICU End of Shift Summary. See flowsheets for vital signs and detailed assessment.    Changes this shift: transferred from  at 0400. Pt is confused, disoriented x2-3. Following simple commands. ST 110s. Maintaining MAP and SBP goals without intervention. RA. NG to LIS with small amount of output. Aneuric. Heparin gtt titrated per protocol.    Plan: continue plan of care.

## 2023-10-22 NOTE — PROGRESS NOTES
Brief Note from SICU    Notified by RN about slow persistent bleeding around exit site of the tunneled HD line. IR paged. On quick examination, appears to stem proximally from tunnel exit rather than the exit itself. To temporize the bleeding, a pursestring suture was carefully placed with 3-0 nylon after sterile cleaning of the area and local lidocaine applied. Special attention was given to avoid the line itself, and remaining in the sub-cutaneous tissue around the line.Given patient is on Heparin gtt, expected bleeding at suture entry/exit sites, easily controlled with pressure and gauze. Bleeding from tunneled site slowed precipitously. A hematoma was not palpable after holding pressure for 5 minutes. The site was sterilely re-prepped with tegaderm dressing and gauze covering the catheter exit site.     Will follow-up with IR on Monday if any further intervention is indicated.    Dr. Mccall was present and immediately available at all times.    Clint Braga MD  SICU [id number]

## 2023-10-22 NOTE — PLAN OF CARE
ICU End of Shift Summary. See flowsheets for vital signs and detailed assessment.    Changes this shift: Alert and oriented x3 to only oriented to self. Patient became more  and more restless, picking at lines, pulled PIV x1, picking at L leg amputation dressing, taking off gown. Mitts on when family is not here. Vitally stable, RA. Patient had sensation of needing to void, RN bladder scan for 340-390's, straight cath for 0cc. Vasc surgery changed dressings, reports ok for RN to change if soiled. Up to chair x2 with lift. Rectal tube for loose stools. Heparin 10a 0.46, recheck at 2000. Removed ART line, removed L CVC line. Lab collect. NJ feeds, OG continues LIS. Hgb recheck stable. HD line bleed, oozing, notified team, sutures placed, bleeding improved.     Swallow study tomorrow AM or Tuesday 10/24. HD tomorrow? Called Dialysis to coordinate events, no return calls from dialysis.     1815 event: Patient pulled out NG and NJ, pulled off mitts and pulled tubes when writer was outside of patient room, restless, notified resident, suggested a wrist restraint order, placed wrist restraint, patient is not redirectable, hallucinations. NG placed, no staff to place NJ, asked for abdominal XR to verify, possible ok to give meds via NG tonight?     Plan: Monitor for bleed. Continue to assess delirium. Mitts and wrist restraints for line pulling. Continue with POC.     Goal Outcome Evaluation:      Plan of Care Reviewed With: patient, spouse, family    Overall Patient Progress: no changeOverall Patient Progress: no change    Outcome Evaluation: RA, delirium, loose stools. Continue with POC.

## 2023-10-23 ENCOUNTER — APPOINTMENT (OUTPATIENT)
Dept: SPEECH THERAPY | Facility: CLINIC | Age: 68
DRG: 268 | End: 2023-10-23
Payer: COMMERCIAL

## 2023-10-23 ENCOUNTER — APPOINTMENT (OUTPATIENT)
Dept: GENERAL RADIOLOGY | Facility: CLINIC | Age: 68
DRG: 268 | End: 2023-10-23
Payer: COMMERCIAL

## 2023-10-23 LAB
ABO/RH(D): NORMAL
ALBUMIN SERPL BCG-MCNC: 3 G/DL (ref 3.5–5.2)
ALP SERPL-CCNC: 99 U/L (ref 40–129)
ALT SERPL W P-5'-P-CCNC: 25 U/L (ref 0–70)
ANION GAP SERPL CALCULATED.3IONS-SCNC: 18 MMOL/L (ref 7–15)
ANTIBODY SCREEN: NEGATIVE
AST SERPL W P-5'-P-CCNC: 25 U/L (ref 0–45)
BASE EXCESS BLDV CALC-SCNC: 7.1 MMOL/L (ref -7.7–1.9)
BASO+EOS+MONOS # BLD AUTO: ABNORMAL 10*3/UL
BASO+EOS+MONOS NFR BLD AUTO: ABNORMAL %
BASOPHILS # BLD AUTO: ABNORMAL 10*3/UL
BASOPHILS # BLD MANUAL: 0 10E3/UL (ref 0–0.2)
BASOPHILS NFR BLD AUTO: ABNORMAL %
BASOPHILS NFR BLD MANUAL: 0 %
BILIRUB DIRECT SERPL-MCNC: 0.27 MG/DL (ref 0–0.3)
BILIRUB SERPL-MCNC: 0.5 MG/DL
BLD PROD TYP BPU: NORMAL
BLOOD COMPONENT TYPE: NORMAL
BUN SERPL-MCNC: 120 MG/DL (ref 8–23)
CA-I BLD-MCNC: 4.2 MG/DL (ref 4.4–5.2)
CALCIUM SERPL-MCNC: 8.7 MG/DL (ref 8.8–10.2)
CHLORIDE SERPL-SCNC: 90 MMOL/L (ref 98–107)
CODING SYSTEM: NORMAL
CREAT SERPL-MCNC: 5.55 MG/DL (ref 0.67–1.17)
CROSSMATCH: NORMAL
DEPRECATED HCO3 PLAS-SCNC: 27 MMOL/L (ref 22–29)
EGFRCR SERPLBLD CKD-EPI 2021: 10 ML/MIN/1.73M2
EOSINOPHIL # BLD AUTO: ABNORMAL 10*3/UL
EOSINOPHIL # BLD MANUAL: 2.4 10E3/UL (ref 0–0.7)
EOSINOPHIL NFR BLD AUTO: ABNORMAL %
EOSINOPHIL NFR BLD MANUAL: 19 %
ERYTHROCYTE [DISTWIDTH] IN BLOOD BY AUTOMATED COUNT: 18.3 % (ref 10–15)
FLEXIBLE SIGMOIDOSCOPY: NORMAL
GLUCOSE BLDC GLUCOMTR-MCNC: 138 MG/DL (ref 70–99)
GLUCOSE BLDC GLUCOMTR-MCNC: 149 MG/DL (ref 70–99)
GLUCOSE BLDC GLUCOMTR-MCNC: 151 MG/DL (ref 70–99)
GLUCOSE BLDC GLUCOMTR-MCNC: 158 MG/DL (ref 70–99)
GLUCOSE BLDC GLUCOMTR-MCNC: 171 MG/DL (ref 70–99)
GLUCOSE SERPL-MCNC: 153 MG/DL (ref 70–99)
HCO3 BLDV-SCNC: 30 MMOL/L (ref 21–28)
HCT VFR BLD AUTO: 21.4 % (ref 40–53)
HGB BLD-MCNC: 6.6 G/DL (ref 13.3–17.7)
HGB BLD-MCNC: 6.8 G/DL (ref 13.3–17.7)
HGB BLD-MCNC: 7.9 G/DL (ref 13.3–17.7)
HOLD SPECIMEN: NORMAL
IMM GRANULOCYTES # BLD: ABNORMAL 10*3/UL
IMM GRANULOCYTES NFR BLD: ABNORMAL %
ISSUE DATE AND TIME: NORMAL
LACTATE SERPL-SCNC: 2 MMOL/L (ref 0.7–2)
LYMPHOCYTES # BLD AUTO: ABNORMAL 10*3/UL
LYMPHOCYTES # BLD MANUAL: 0.3 10E3/UL (ref 0.8–5.3)
LYMPHOCYTES NFR BLD AUTO: ABNORMAL %
LYMPHOCYTES NFR BLD MANUAL: 2 %
MAGNESIUM SERPL-MCNC: 2.9 MG/DL (ref 1.7–2.3)
MCH RBC QN AUTO: 30.6 PG (ref 26.5–33)
MCHC RBC AUTO-ENTMCNC: 31.8 G/DL (ref 31.5–36.5)
MCV RBC AUTO: 96 FL (ref 78–100)
MONOCYTES # BLD AUTO: ABNORMAL 10*3/UL
MONOCYTES # BLD MANUAL: 0.6 10E3/UL (ref 0–1.3)
MONOCYTES NFR BLD AUTO: ABNORMAL %
MONOCYTES NFR BLD MANUAL: 5 %
NEUTROPHILS # BLD AUTO: ABNORMAL 10*3/UL
NEUTROPHILS # BLD MANUAL: 9.3 10E3/UL (ref 1.6–8.3)
NEUTROPHILS NFR BLD AUTO: ABNORMAL %
NEUTROPHILS NFR BLD MANUAL: 74 %
NRBC # BLD AUTO: 0 10E3/UL
NRBC BLD AUTO-RTO: 0 /100
O2/TOTAL GAS SETTING VFR VENT: 21 %
OXYHGB MFR BLDV: 87 % (ref 70–75)
PCO2 BLDV: 37 MM HG (ref 40–50)
PH BLDV: 7.53 [PH] (ref 7.32–7.43)
PHOSPHATE SERPL-MCNC: 4.2 MG/DL (ref 2.5–4.5)
PLAT MORPH BLD: ABNORMAL
PLATELET # BLD AUTO: 185 10E3/UL (ref 150–450)
PO2 BLDV: 51 MM HG (ref 25–47)
POLYCHROMASIA BLD QL SMEAR: SLIGHT
POTASSIUM SERPL-SCNC: 4.4 MMOL/L (ref 3.4–5.3)
PROT SERPL-MCNC: 5.6 G/DL (ref 6.4–8.3)
RBC # BLD AUTO: 2.22 10E6/UL (ref 4.4–5.9)
RBC MORPH BLD: ABNORMAL
SODIUM SERPL-SCNC: 135 MMOL/L (ref 135–145)
SPECIMEN EXPIRATION DATE: NORMAL
UFH PPP CHRO-ACNC: 0.6 IU/ML
UNIT ABO/RH: NORMAL
UNIT NUMBER: NORMAL
UNIT STATUS: NORMAL
UNIT TYPE ISBT: 5100
WBC # BLD AUTO: 12.6 10E3/UL (ref 4–11)

## 2023-10-23 PROCEDURE — 82805 BLOOD GASES W/O2 SATURATION: CPT | Performed by: STUDENT IN AN ORGANIZED HEALTH CARE EDUCATION/TRAINING PROGRAM

## 2023-10-23 PROCEDURE — 82310 ASSAY OF CALCIUM: CPT

## 2023-10-23 PROCEDURE — 74230 X-RAY XM SWLNG FUNCJ C+: CPT | Mod: 26 | Performed by: RADIOLOGY

## 2023-10-23 PROCEDURE — 86923 COMPATIBILITY TEST ELECTRIC: CPT

## 2023-10-23 PROCEDURE — 92611 MOTION FLUOROSCOPY/SWALLOW: CPT | Mod: GN | Performed by: SPEECH-LANGUAGE PATHOLOGIST

## 2023-10-23 PROCEDURE — 99233 SBSQ HOSP IP/OBS HIGH 50: CPT | Performed by: CLINICAL NURSE SPECIALIST

## 2023-10-23 PROCEDURE — 36415 COLL VENOUS BLD VENIPUNCTURE: CPT

## 2023-10-23 PROCEDURE — 85520 HEPARIN ASSAY: CPT | Performed by: SURGERY

## 2023-10-23 PROCEDURE — 250N000009 HC RX 250

## 2023-10-23 PROCEDURE — 83605 ASSAY OF LACTIC ACID: CPT | Performed by: PHYSICIAN ASSISTANT

## 2023-10-23 PROCEDURE — 200N000002 HC R&B ICU UMMC

## 2023-10-23 PROCEDURE — 250N000011 HC RX IP 250 OP 636

## 2023-10-23 PROCEDURE — 85018 HEMOGLOBIN: CPT

## 2023-10-23 PROCEDURE — 90937 HEMODIALYSIS REPEATED EVAL: CPT

## 2023-10-23 PROCEDURE — 258N000003 HC RX IP 258 OP 636: Performed by: STUDENT IN AN ORGANIZED HEALTH CARE EDUCATION/TRAINING PROGRAM

## 2023-10-23 PROCEDURE — 250N000013 HC RX MED GY IP 250 OP 250 PS 637

## 2023-10-23 PROCEDURE — 36415 COLL VENOUS BLD VENIPUNCTURE: CPT | Performed by: STUDENT IN AN ORGANIZED HEALTH CARE EDUCATION/TRAINING PROGRAM

## 2023-10-23 PROCEDURE — 250N000013 HC RX MED GY IP 250 OP 250 PS 637: Performed by: SURGERY

## 2023-10-23 PROCEDURE — 250N000013 HC RX MED GY IP 250 OP 250 PS 637: Performed by: ANESTHESIOLOGY

## 2023-10-23 PROCEDURE — 92526 ORAL FUNCTION THERAPY: CPT | Mod: GN | Performed by: SPEECH-LANGUAGE PATHOLOGIST

## 2023-10-23 PROCEDURE — 83735 ASSAY OF MAGNESIUM: CPT | Performed by: PHYSICIAN ASSISTANT

## 2023-10-23 PROCEDURE — 84100 ASSAY OF PHOSPHORUS: CPT | Performed by: PHYSICIAN ASSISTANT

## 2023-10-23 PROCEDURE — 86900 BLOOD TYPING SEROLOGIC ABO: CPT | Performed by: SURGERY

## 2023-10-23 PROCEDURE — 80076 HEPATIC FUNCTION PANEL: CPT

## 2023-10-23 PROCEDURE — 99231 SBSQ HOSP IP/OBS SF/LOW 25: CPT | Mod: GC | Performed by: SURGERY

## 2023-10-23 PROCEDURE — P9016 RBC LEUKOCYTES REDUCED: HCPCS

## 2023-10-23 PROCEDURE — 86923 COMPATIBILITY TEST ELECTRIC: CPT | Performed by: NURSE PRACTITIONER

## 2023-10-23 PROCEDURE — 634N000001 HC RX 634: Mod: JZ | Performed by: STUDENT IN AN ORGANIZED HEALTH CARE EDUCATION/TRAINING PROGRAM

## 2023-10-23 PROCEDURE — 85007 BL SMEAR W/DIFF WBC COUNT: CPT

## 2023-10-23 PROCEDURE — 74230 X-RAY XM SWLNG FUNCJ C+: CPT

## 2023-10-23 PROCEDURE — 82330 ASSAY OF CALCIUM: CPT | Performed by: PHYSICIAN ASSISTANT

## 2023-10-23 RX ORDER — CALCIUM GLUCONATE 20 MG/ML
1 INJECTION, SOLUTION INTRAVENOUS ONCE
Status: COMPLETED | OUTPATIENT
Start: 2023-10-23 | End: 2023-10-23

## 2023-10-23 RX ORDER — BARIUM SULFATE 400 MG/ML
SUSPENSION ORAL ONCE
Status: COMPLETED | OUTPATIENT
Start: 2023-10-23 | End: 2023-10-23

## 2023-10-23 RX ORDER — DEXMEDETOMIDINE HYDROCHLORIDE 4 UG/ML
.1-1.2 INJECTION, SOLUTION INTRAVENOUS CONTINUOUS PRN
Status: DISCONTINUED | OUTPATIENT
Start: 2023-10-23 | End: 2023-10-25

## 2023-10-23 RX ADMIN — ACETAMINOPHEN 975 MG: 325 TABLET, FILM COATED ORAL at 20:30

## 2023-10-23 RX ADMIN — BARIUM SULFATE 5 ML: 400 SUSPENSION ORAL at 08:41

## 2023-10-23 RX ADMIN — HEPARIN SODIUM 2200 UNITS/HR: 10000 INJECTION, SOLUTION INTRAVENOUS at 11:45

## 2023-10-23 RX ADMIN — SODIUM CHLORIDE 250 ML: 9 INJECTION, SOLUTION INTRAVENOUS at 12:52

## 2023-10-23 RX ADMIN — ACETAMINOPHEN 975 MG: 325 TABLET, FILM COATED ORAL at 15:14

## 2023-10-23 RX ADMIN — TRIAMCINOLONE ACETONIDE: 1 OINTMENT TOPICAL at 20:31

## 2023-10-23 RX ADMIN — MIDODRINE HYDROCHLORIDE 20 MG: 5 TABLET ORAL at 13:11

## 2023-10-23 RX ADMIN — Medication 15 ML: at 09:31

## 2023-10-23 RX ADMIN — EPOETIN ALFA-EPBX 4000 UNITS: 10000 INJECTION, SOLUTION INTRAVENOUS; SUBCUTANEOUS at 12:52

## 2023-10-23 RX ADMIN — HEPARIN SODIUM 2200 UNITS/HR: 10000 INJECTION, SOLUTION INTRAVENOUS at 20:31

## 2023-10-23 RX ADMIN — HYDROCORTISONE: 25 CREAM TOPICAL at 20:31

## 2023-10-23 RX ADMIN — CALCIUM GLUCONATE 1 G: 20 INJECTION, SOLUTION INTRAVENOUS at 09:32

## 2023-10-23 RX ADMIN — INSULIN ASPART 2 UNITS: 100 INJECTION, SOLUTION INTRAVENOUS; SUBCUTANEOUS at 15:15

## 2023-10-23 RX ADMIN — ACETAMINOPHEN 975 MG: 325 TABLET, FILM COATED ORAL at 04:47

## 2023-10-23 RX ADMIN — Medication 40 MG: at 09:33

## 2023-10-23 RX ADMIN — INSULIN ASPART 1 UNITS: 100 INJECTION, SOLUTION INTRAVENOUS; SUBCUTANEOUS at 11:47

## 2023-10-23 RX ADMIN — ATORVASTATIN CALCIUM 10 MG: 10 TABLET, FILM COATED ORAL at 20:30

## 2023-10-23 RX ADMIN — SODIUM CHLORIDE 300 ML: 9 INJECTION, SOLUTION INTRAVENOUS at 12:51

## 2023-10-23 RX ADMIN — ACETAMINOPHEN 975 MG: 325 TABLET, FILM COATED ORAL at 11:46

## 2023-10-23 RX ADMIN — QUETIAPINE FUMARATE 75 MG: 50 TABLET ORAL at 22:15

## 2023-10-23 RX ADMIN — Medication: at 10:00

## 2023-10-23 RX ADMIN — TRIAMCINOLONE ACETONIDE: 1 OINTMENT TOPICAL at 09:34

## 2023-10-23 RX ADMIN — HYDROCORTISONE: 25 CREAM TOPICAL at 09:34

## 2023-10-23 RX ADMIN — DEXMEDETOMIDINE HYDROCHLORIDE 0.2 MCG/KG/HR: 400 INJECTION INTRAVENOUS at 22:08

## 2023-10-23 RX ADMIN — QUETIAPINE FUMARATE 25 MG: 25 TABLET ORAL at 04:47

## 2023-10-23 RX ADMIN — INSULIN ASPART 1 UNITS: 100 INJECTION, SOLUTION INTRAVENOUS; SUBCUTANEOUS at 20:42

## 2023-10-23 RX ADMIN — INSULIN ASPART 1 UNITS: 100 INJECTION, SOLUTION INTRAVENOUS; SUBCUTANEOUS at 05:14

## 2023-10-23 ASSESSMENT — ACTIVITIES OF DAILY LIVING (ADL)
ADLS_ACUITY_SCORE: 51

## 2023-10-23 NOTE — PROGRESS NOTES
Nephrology Progress Note  10/23/2023       Alfredo Burnham is a 68 yom with complex hx of TIA, HTN, blindness who presented to [facility] 9/24 with severe abdominal pain radiating to flank and back, CT revealed AAA with a contained rupture.  Taken to OR for aortobiiliac bypass and resection of ruptured pararenal aneurysm.   Post op course complicated by hypotension requiring pressors and metabolic acidosis.  Baseline Cr 1.0 on presentation but on the rise to >5 at time of renal consult for MAYA management and possible RRT.       Interval History :   Mr Burnham had CRRT stopped 10/4, generally has been stable on iHD since then but had resp arrest immediately post HD run on 10/13 requiring return to ICU and intubation, now re-extubated and doing well.  Made ~600cc of UOP by straight cath this am, plan to do this daily to make sure we are not having large residuals/obstructive issue.  Despite some UOP he has minimal clearance based on labs so running HD today but hopeful he eventually will show some increase in clearance.      Assessment & Recommendations:   MAYA-Baseline Cr 1.0, ordered UA.  CT showed benign cyst but otherwise normal kidneys.  Cr on the rise since surgery, did receive contrast for CTA.  Urine microscopy showed granular casts suggesting ATN which will recover with time and stabilizing hemodynamics.  Started on CRRT 9/30 for volume and clearance with minimal UOP and rising Cr.                  -Started CRRT 9/30 for volume and clearance, stopped 10/4 and now running iHD PRN (generally on MWF) and watching for recovery.  Infarcts seen on CT 10/13 lowers chances of recovery.       -Appreciate IR placing tunneled line RIJ 10/18.                  -Dialysis consent signed and scanned into media tab.      Volume-Wt on downtrend overall but up the past 2 days without HD, BP's reasonable overnight and has some edema so will plan to pull 2-3L to account for gains the past ~48h.      Electrolytes-K 4.4, bicarb 27  "with high AG/negative lactate.       BMD-Ca 8.7, Mg 2.9.  Phos 4.2, no issues.        Anemia-Hgb 6.8, ~14 on presentation, acute management per team.       Nutrition-On Osmolite TF.       Time spent: 40 minutes on this date of encounter for chart review, physical exam, medical decision making and co-ordination of care.       Recommendations were communicated to primary team via verbal communication.        ALTAGRACIA Jiménez CNS  Clinical Nurse Specialist  [phone]    Review of Systems:   I reviewed the following systems:  ROS not done due to lethargy    Physical Exam:   I/O last 3 completed shifts:  In: 2097.38 [I.V.:527.38; NG/GT:470]  Out: 1695 [Urine:600; Emesis/NG output:750; Drains:45; Stool:300]   BP (!) 128/98   Pulse 109   Temp 97.7  F (36.5  C) (Axillary)   Resp 16   Ht 1.727 m (5' 8\")   Wt 74.4 kg (164 lb 0.4 oz)   SpO2 97%   BMI 24.94 kg/m       GENERAL APPEARANCE: Extubated, lethargic, in no distress.   EYES: No scleral icterus  Pulmonary: On vent 40%/8 of PEEP  CV: Regular rhythm, normal rate   - Edema +1-2 generalized, + scrotal edema.    GI: distended, nontender  MS: no evidence of inflammation in joints, no muscle tenderness  : No Lu  SKIN: no rash, warm, dry  NEURO: No focal deficits.         Labs:   All labs reviewed by me  Electrolytes/Renal -   Recent Labs   Lab Test 10/23/23  1143 10/23/23  0555 10/23/23  0512 10/22/23  0427 10/22/23  0422 10/21/23  0401 10/21/23  0354   NA  --  135  --   --  137  --  133*   POTASSIUM  --  4.4  --   --  4.2  --  3.8   CHLORIDE  --  90*  --   --  93*  --  93*   CO2  --  27  --   --  27  --  25   BUN  --  120.0*  --   --  101.0*  --  67.9*   CR  --  5.55*  --   --  4.40*  --  3.00*   * 153* 149*   < > 129*   < > 160*   OMAR  --  8.7*  --   --  8.6*  --  8.5*   MAG  --  2.9*  --   --  2.8*  --  2.0   PHOS  --  4.2  --   --  3.4  --  3.0    < > = values in this interval not displayed.       CBC -   Recent Labs   Lab Test 10/23/23  0719 " 10/23/23  0555 10/22/23  1309 10/22/23  0422 10/21/23  0354   WBC  --  12.6*  --  11.3* 13.9*   HGB 6.6* 6.8* 7.8* 7.1* 8.0*   PLT  --  185  --  167 159       LFTs -   Recent Labs   Lab Test 10/23/23  0555 10/22/23  0422 10/21/23  0354   ALKPHOS 99 110 133*   BILITOTAL 0.5 0.4 0.4   ALT 25 31 49   AST 25 30 53*   PROTTOTAL 5.6* 5.7* 5.7*   ALBUMIN 3.0* 3.1* 2.7*       Iron Panel - No lab results found.        Current Medications:   acetaminophen  975 mg Oral or Feeding Tube Q6H    atorvastatin  10 mg Oral or Feeding Tube QPM    hydrocortisone   Topical BID    insulin aspart  1-12 Units Subcutaneous Q4H    melatonin  1 mg Oral or Feeding Tube QPM    multivitamins w/minerals  15 mL Per Feeding Tube Daily    - MEDICATION INSTRUCTIONS -   Does not apply Once    pantoprazole  40 mg Oral or Feeding Tube QAM AC    protein modular  1 packet Per Feeding Tube TID    QUEtiapine  75 mg Oral or Feeding Tube At Bedtime    sodium chloride (PF)  9 mL Intracatheter During Dialysis/CRRT (from stock)    sodium chloride (PF)  9 mL Intracatheter During Dialysis/CRRT (from stock)    triamcinolone   Topical BID      dexmedeTOMIDine      dextrose      dextrose Stopped (10/22/23 2106)    heparin 2,200 Units/hr (10/23/23 1200)

## 2023-10-23 NOTE — ANESTHESIA POSTPROCEDURE EVALUATION
Patient: Alfredo Burnham    Procedure: Procedure(s):  Delayed primary subcutaneous abdominal closure       Anesthesia Type:  General    Note:  Disposition: Admission   Postop Pain Control: Uneventful            Sign Out: Well controlled pain   PONV: No   Neuro/Psych: Uneventful            Sign Out: Acceptable/Baseline neuro status   Airway/Respiratory: Uneventful            Sign Out: Acceptable/Baseline resp. status   CV/Hemodynamics: Uneventful            Sign Out: Acceptable CV status; No obvious hypovolemia; No obvious fluid overload   Other NRE: NONE   DID A NON-ROUTINE EVENT OCCUR? No    Event details/Postop Comments:  No complications.           Last vitals:  Vitals:    10/23/23 0500 10/23/23 0600 10/23/23 0700   BP: (!) 142/81 (!) 148/75 (!) 151/81   Pulse: 118 116 117   Resp: 22 18 24   Temp:      SpO2: 93% 97% 97%       Electronically Signed By: Mason Curran MD  October 23, 2023  7:39 AM

## 2023-10-23 NOTE — PROGRESS NOTES
SURGICAL ICU PROGRESS NOTE  10/23/2023        Date of Service (when I saw the patient): 10/23/2023    ASSESSMENT:  Alfredo Burnham is a 68 year old male who was admitted to the SICU on 9/24/2023 s/p open AAA repair. Patient has a history of HTN and previous TIA. He presented to the ED on 9/24 with right sided abdominal pain and was found to have a contained, ruptured AAA on CT. 30 min super-celiac clamp time. Prolonged intra-renal clamp. 9/25 s/p flex sig showing rectal ischemia, abdominal washout showing a hemostatic AAA graft and no evidence of transmural colonic or small bowel ischemia, and an unsuccessful LLE embolectomy. 9/26 s/p repeat abdominal washout, retroperitoneal closure, LLE wound washout. 9/29 s/p repeat abd washout, bowel appeared well perfused w/o notable ischemia. OR 10/2 for abdominal fascia closure. Extubated 10/4. Unequal pupils and R facial droop, MRI brain showed multiple small infarcts, likely embolic. Patient was transferred out of the surgical ICU on 10/12. On 10/13, patient completed dialysis run and suddenly started experiencing desaturation. Patient was emergently intubated for Acute Hypoxic Respiratory Failure in dialysis unit and transferred to SICU for further cares. L AKA 10/18 with abd wound vac exchange. OR on 10/20 for primary skin closure. Patient admitted to ICU for intubation and sedation management.     CHANGES and MAJOR THINGS TODAY:   - Ongoing bilious NG output, continue LIS for NG, decreased  - Hemoglobin low, received 1 U PRBC, Hb recheck at 1  - Continue NPO due to aspiration risk per SLP, failed video swallow  - Replaced K, K goal >4  - Evening Seroquel changed to 75qhs  - Continue precedex straight rate at bedtime  - Okay for floor when able. No further ICU needs.    PLAN:  Neurological:  # Sedation  RTOR 10/20 pt returned on propofol 40, plan to wean   - Will continue precedex only at bedtime for delirium    # Acute pain  RAPS femoral nerve block 10/18  -  Multi-modal pain control effective (scheduled tylenol, oxycodone PRN, dilaudid PRN, robaxin 500 mg TID PRN).      #Facial droop, left lacunar infarct  #Punctate lesions of cerebellum   - Follow-up with stroke neurology 4-6 weeks after discharge  - ADDIS with no evidence of embolic source     # Delirium, ongoing  # Mixed metabolic encephalopathy   # Sedation, RASS goal 0 to -1  - Monitor neurological status. Delirium preventions and precautions  - Seroquel 75 mg at bedtime, precedex overnight     Pulmonary:  # Acute hypoxic respiratory failure  # Re-intubation 10/13 (after prev extubation 10/3)  # Pulmonary embolism   Satting well overnight, %.   Extubated, doing well. Tolerating 4L NC to RA    Cardiovascular:    # Contained AAA rupture s/p open repair 9/25  # Acute critical limb ischaemia of LLE  # Hx of HTN  # Tachycardia  # L iliacus hematoma  # Peripancreatic hematoma   - Continue to monitor hemodynamic status; Goal MAP >65, SBP <160  - Heparin gtt continuing  - Continue Lipitor 10 mg     GI/Nutrition:    # s/p open AAA repair, wound vac device in place  # Post-operative melena, resolved  # Rectal ischemia, concern for, resolved  # Aspiration event 10/9  - BR with senna BID and miralax daily  - TPN discontinued yesterday    - ND tube placed, TF at goal   - PPI readded given high NG output, continue to monitor  - Monitor electrolytes, replaced K and Mag  - continue NPO status per SLP, will continue to assess, appreciate assistance, failed video swallow     Renal/Fluids/Electrolytes:  # Protein calorie deficit malnutrition, risk of   # Acute kidney injury  # Oliguria  # Haemodialysis  #Hyponatremia   - HD per nephrology, w/ tunneled dialysis line  - Strict intake and output  - Monitor electrolytes and replace, high protocol for Mag, Phos and K    Endocrine:  # Stress hyperglycaemia  - High SSI for glucose control  - q6h BG checks     Infectious disease:   # oropharyngeal candidiasis  # Likely aspiration event  during ADDIS  WBC up trending at 15.3. Sputum culture collected, growing 1+ GPCs.  - Completed course of levaquin (transitioned from Ceftriaxone)    Hematology:    # Acute blood loss anemia  - Repeat Hb check daily, received 1 U PRBCs for Hb low  - Recheck Hb at 1PM  - Oozing around tunneled dialysis line, IR notified.  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 No medications per pharm thought to be the cause. Derm evaluated and felt it was a morbilliform eruption vs less likely DRESS syndrome.   - CBC with differential daily  - LFTs daily  - HHV-6 (-) and EBV (+), no indication for further management at this time   - Continue triamcinolone 0.1% ointment, hydrocortisone cream twice daily to all body areas with rash. Zyrtec 10 mg q12, benadryl TID PRN.   - Monitor closely for progression or change in skin findings.  - Dermatology looped in again for question of timing of topicals, appreciate recommendations.   - Consider peripheral smear to assess for reactive lymphocytes  - Wound on left cheek - seen by [facility]    General Cares/Prophylaxis:    DVT Prophylaxis: Pneumatic Compression Devices and heparin gtt (therapeutic)  GI Prophylaxis: PPI  Restraints: Restraints for medical healing needed: YES     Lines/ tubes/ drains:  -R tunneled HD line  -NDT  -NGT  -PIVs in RUE    Disposition:  - Recommend transfer to floor when able.     Patient seen, findings and plan discussed with surgical ICU staff, Dr. Mccall.    Clint Braga MD  Surgical ICU  ====================================  INTERVAL HISTORY:   Overnight events reviewed. Doing well. Ongoing delirium. Ongoing dialysis needs. NG/NJ tube removed, replaced by  nursing, appropriately positioned. Oozing out of dialysis site, sutured with pursestring suture, decreased bleeding.    ROS negative other than previously mentioned.       OBJECTIVE:   1. VITAL SIGNS:   Temp:  [97.4  F (36.3  C)-98.3  F (36.8  C)] 97.8  F (36.6  C)  Pulse:  [] 113  Resp:  [12-24] 22  BP: (129-154)/(75-97) 144/86  MAP:  [87 mmHg-107 mmHg] 99 mmHg  Arterial Line BP: (131-156)/(64-77) 145/72  SpO2:  [93 %-100 %] 96 %  Resp: 22      2. INTAKE/ OUTPUT:   I/O last 3 completed shifts:  In: 2097.38 [I.V.:527.38; NG/GT:470]  Out: 1695 [Urine:600; Emesis/NG output:750; Drains:45; Stool:300]    3. PHYSICAL EXAMINATION:  General: Comfortable on RA. Speech is soft and gargled.    HEENT: normocephalic,atraumatic  Neuro: opens eyes to command, moves all extremities equally. Oriented to self.  Pulm/Resp: Equal chest rise, no respiratory distress  CV: hypertensive, off pressors  Abdomen: Abdomen mildly distended. Midline laparotomy incision. Dressing in place, CDI. abdominal binder in place.  Gu: decreased scrotal swelling.  MSK/Extremities: L AKA wrapped and dressed without strikethrough    4. INVESTIGATIONS:   Arterial Blood Gases   No lab results found in last 7 days.    Complete Blood Count   Recent Labs   Lab 10/23/23  0719 10/23/23  0555 10/22/23  1309 10/22/23  0422 10/21/23  0354 10/20/23  1340 10/20/23  0408   WBC  --  12.6*  --  11.3* 13.9*  --  14.2*   HGB 6.6* 6.8* 7.8* 7.1* 8.0*   < > 7.0*   PLT  --  185  --  167 159  --  147*    < > = values in this interval not displayed.     Basic Metabolic Panel  Recent Labs   Lab 10/23/23  0555 10/23/23  0512 10/23/23  0029 10/22/23  2008 10/22/23  0427 10/22/23  0422 10/21/23  0401 10/21/23  0354 10/20/23  1224 10/20/23  0408     --   --   --   --  137  --  133*  --  131*   POTASSIUM 4.4  --   --   --   --  4.2  --  3.8  --  3.9   CHLORIDE 90*  --   --   --   --  93*  --  93*  --  95*   CO2 27  --   --   --   --  27  --  25  --  17*   .0*  --    --   --   --  101.0*  --  67.9*  --  127.0*   CR 5.55*  --   --   --   --  4.40*  --  3.00*  --  4.95*   * 149* 138* 128*   < > 129*   < > 160*   < > 141*  144*    < > = values in this interval not displayed.     Liver Function Tests  Recent Labs   Lab 10/23/23  0555 10/22/23  0422 10/21/23  0354 10/20/23  0408   AST 25 30 53* 40   ALT 25 31 49 34   ALKPHOS 99 110 133* 106   BILITOTAL 0.5 0.4 0.4 0.3   ALBUMIN 3.0* 3.1* 2.7* 2.5*     Pancreatic Enzymes  No lab results found in last 7 days.    Coagulation Profile  No lab results found in last 7 days.        5. RADIOLOGY:   Recent Results (from the past 24 hour(s))   XR Abdomen Port 1 View    Narrative    Exam: XR ABDOMEN PORT 1 VIEW, 10/22/2023 6:54 PM    Indication: NG placement    Comparison: 10/20/2023    Findings:   Enteric tube terminates in the stomach. Previously seen feeding tube  is no longer visualized. Nonobstructive bowel gas pattern. Surgical  clips and skin staples.      Impression    Impression: A single enteric tube terminates in the stomach. Feeding  tube no longer visualized.    I have personally reviewed the examination and initial interpretation  and I agree with the findings.    SABINO MURRIETA DO         SYSTEM ID:  [id number]   XR Abdomen Port 1 View    Narrative    Exam: XR ABDOMEN PORT 1 VIEW, 10/22/2023 8:32 PM    Indication: s/p NG tube placement    Comparison: Same day chest x-ray    Findings:   Gastric tube terminates in the stomach. A feeding tube terminates at  the junction of the second and third portions of the duodenum.  Nondistended loops of small and large bowel present within the upper  abdomen. Surgical clips and skin staples project over the central  abdomen. Low lung volumes with streaky perihilar and bibasilar  opacities possible small left pleural effusion.     Impression    Impression: Feeding tube terminates at the junction of the second and  third portions of the duodenum and additional enteric tube terminates  in  the stomach.    I have personally reviewed the examination and initial interpretation  and I agree with the findings.    SABINO MURRIETA DO         SYSTEM ID:  [id number]   XR Video Swallow with SLP or OT    Narrative    Examination: Modified Barium Swallow Study with Speech Pathology,  10/23/2023 9:24 AM.    Comparison: None.    History: Recently extubated. Assess swallow function.    Fluoroscopy time: 2.2 minutes.    Technique: Under fluoroscopic guidance, the patient was given orally  administered barium of varying consistencies in the presence of the  speech pathology service.     Findings:  Delayed oral phase and swallow initiation. With thin liquid  consistency, there was laryngeal penetration and silent tracheal  aspiration of both the initial bolus and remaining residue. With  mildly thick liquid consistency, there was laryngeal penetration  without tracheal aspiration. With moderately thick liquid consistency,  there was no laryngeal penetration or tracheal aspiration. Moderate  amount of residue in the posterior oral cavity, vallecula, and  piriform sinuses throughout the examination.        Impression    Impression:   1. Laryngeal penetration and silent tracheal aspiration of thin liquid  consistency (both the initial bolus and remaining residue).  2. Laryngeal penetration without tracheal aspiration of mildly thick  liquid consistency.     Please see the speech pathologist report for further details regarding  the modified barium swallow study portion of the examination.    I have personally reviewed the examination and initial interpretation  and I agree with the findings.    ILEANA SIEGEL MD         SYSTEM ID:  [id number]

## 2023-10-23 NOTE — PLAN OF CARE
ICU End of Shift Summary. See flowsheets for vital signs and detailed assessment.    Changes this shift: Patient disoriented to time and place. Continues to be restless with bilateral mitts on to prevent pulling at lines/tubes. ST rhythm with rates 100-120, occasional PVCs. Achieving SBP and MAP goals without medications. Denies pain.   Video swallow study this morning - pt aspirating, plan to reassess in 1 week.   HD run this afternoon with 2.1L fluid removal. Report need to urinate but couldn't go, bladder scan of 0. Transfused 1 unit RBCs for hgb 6.6, recheck 7.9. HD CVC site continues to bleed - team aware.   Heparin gtt @ 2200 units/hr.     Plan: Continue POC. Transfer to floor when bed available.     Goal Outcome Evaluation:      Plan of Care Reviewed With: patient, spouse    Overall Patient Progress: no changeOverall Patient Progress: no change    Outcome Evaluation: RHINA KENNEDY today, remains delirious.

## 2023-10-23 NOTE — PROGRESS NOTES
"   10/23/23 0905   Appointment Info   Signing Clinician's Name / Credentials (SLP) Josiah Ann MA [facility] SLP   General Information   Onset of Illness/Injury or Date of Surgery 09/24/23   Referring Physician Gilberto Abreu MD   Pertinent History of Current Problem Per provider notes: \"68 year old male with PMH of HTN and previous TIA who presented with R sided abdominal pain found to have contained ruptured AAA, now s/p open AAA repair 9/24 into 9/25am with 30 min supraceliac clamp time, prolonged inter-renal clamp time, temporary abdominal closure... On 9/29 he was started on CRRT given ongoing low UOP and rising Cr and failure of lasix challenge. Now on iHD. Hemodynamically continues to do well off pressors. S/p closure of abdominal fascia and subcutaneous tissue left open with wound VAC applied 10/2/23. With ischemic LLE, AKA deferred until 10/17/2023 due to leukocytosis, hypoxia requiring reintubation (10/13/23) and critical illness. ADDIS on 10/16/23 without evidence of embolic source. 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 S/p 10/20/23 for abdominal incision closure.\" Extubated again 10/20/23. VFSS completed this AM per provider orders.   General Observations Patient upright in fluoro chair, NJ tube in place, NG suction also in place, but clamped. Patient did follow basic directions when verbally directed, but is confused and bilateral mitts in place. Unable to carryover any information (ie. for strategy use). Yesterday evening, he pulled both NJ and NG tubes out.   Pain Assessment   Patient Currently in Pain No   Type of Evaluation   Type of Evaluation Swallow Evaluation   General Swallowing Observations   Past History of Dysphagia None prior to this hospitalization. Speech therapy 10/11-10/12 recommending NPO, then intubated again for AHRF. Extubated 10/20 with SLP bedside re-evaluation on 10/21. Continues to be NPO.   Respiratory Support room air   Current Diet/Method of Nutritional Intake (General " Swallowing Observations, NIS) NPO;nasogastric tube (NG)   Swallowing Evaluation Videofluoroscopic swallow study (VFSS)   VFSS Evaluation   Radiologist Resident   Views Taken left lateral   Physical Location of Procedure [facility] Radiology   VFSS Textures Trialed thin liquids;mildly thick liquids;moderately thick liquids/liquidized   VFSS Eval: Thin Liquid Texture Trial   Mode of Presentation, Thin Liquid spoon;straw;fed by clinician   Order of Presentation 1-4   Preparatory Phase poor bolus control   Oral Phase, Thin Liquid premature pharyngeal entry   Bolus Location When Swallow Triggered valleculae   Pharyngeal Phase, Thin Liquid impaired hyolaryngeal excursion;impaired epiglottic movement;impaired tongue base retraction;pharyngeal wall coating;residue in vallecula;residue in pyriform sinus   Rosenbek's Penetration Aspiration Scale: Thin Liquid Trial Results 8 - contrast passes glottis, visible subglottic residue remains, absent patient response (aspiration)   Response to Aspiration productive cued cough   Diagnostic Statement Increasing pharyngeal residue with ongoing trials. Flash penetration, eventually with deeper penetration on later trials with instance of silent aspiration as material dipped below folds, but appeared to clear with cued cough. Increased residue led to increased penetration spillage that was never fully cleared, eventually leading to the folds.   VFSS Eval: Mildly Thick Liquids   Mode of Presentation spoon;fed by clinician;cup;self-fed   Order of Presentation 5-7   Preparatory Phase WFL   Oral Phase premature pharyngeal entry   Bolus Location When Swallow Triggered valleculae   Pharyngeal Phase impaired hyolaryngel excursion;impaired epiglottic movement;impaired tongue base retraction;residue in vallecula;residue in pyriform sinus   Rosenbek's Penetration Aspiration Scale 8 - contrast passes glottis, visible subglottic residue remains, absent patient response (aspiration)   Response to  Aspiration productive cued cough   Diagnostic Statement Mild amount pharyngeal residue, appeared greater than thin liquids. Increased residue led to increased spillover into the airway. Patient with aspirated material dipping below the vocal folds, but appeared to clear with cued cough.   VFSS Eval: Moderately Thick Liquids   Mode of Presentation cup;self-fed   Order of Presentation 8   Preparatory Phase WFL   Bolus Location When Swallow Triggered valleculae   Pharyngeal Phase impaired hyolaryngel excursion;impaired epiglottic movement;impaired tongue base retraction;residue in vallecula;residue in pyriform sinus   Rosenbek's Penetration Aspiration Scale 8 - contrast passes glottis, visible subglottic residue remains, absent patient response (aspiration)   Diagnostic Statement Increased pharyngeal residue compared to thin and mildly thick. Deep penetration appeared, though difficutl to identify if from previous midlly thick or current moderately thick trial. Material deep to the folds, did dip below, appeared to mostly clear with cued cough however residue remained.   Esophageal Phase of Swallow   Patient reports or presents with symptoms of esophageal dysphagia No   Swallowing Recommendations   Diet Consistency Recommendations NPO;ice chips only;consider alternative means of nutrition   Supervision Level for Intake 1:1 supervision needed   Recommended Feeding/Eating Techniques (Swallow Eval) provide oral hygiene prior to intake   Medication Administration Recommendations, Swallowing (SLP) Via non-oral methods   Instrumental Assessment Recommendations VFSS (videofluoroscopic swallowing study)   General Therapy Interventions   Planned Therapy Interventions Dysphagia Treatment   Dysphagia treatment Oropharyngeal exercise training;Instruction of safe swallow strategies   Clinical Impression   Criteria for Skilled Therapeutic Interventions Met (SLP Eval) Yes, treatment indicated   SLP Diagnosis Moderate-Severe  oropharyngeal dysphagia   Risks & Benefits of therapy have been explained evaluation/treatment results reviewed;care plan/treatment goals reviewed;risks/benefits reviewed;current/potential barriers reviewed;participants voiced agreement with care plan;participants included;patient;spouse/significant other   Clinical Impression Comments   VFSS completed this AM per provider orders. Patient presents with moderate-severe pharyngeal dysphagia in setting of AMS, generalized weakness, & extended intubation. Patient was able to follow directions immediately after verbal instruction provided, but unable to generalize or carryover to other opportunities/situations. Trials of thin liquids, mildly thick liquids, and moderately thick liquids completed under fluoroscopy. Patient oral phase appeared mildly disorganized with thin liquids, however improved with thicker consistencies. Premature pharyngeal entry with thin and mildly thick liquids. Pharyngeal phase c/b initiation of pharyngeal swallow and the valleculae, reduced hyolaryngeal excursion, reduced BOT retraction, incomplete epiglottic inversion, and suspected reduced pharyngeal constriction resulting in mild-moderate amount pharyngeal residue (increasing amount with thicker consistencies and more trials), deep penetration of suspected residual material with ongoing trials, and silent aspiration of all materials ( from suspected residue) that was mostly cleared with a cued cough. Given patient's confusion and impulsivity (requiring bilateral mitts) and inability to recall or independently implement compensatory swallow strategies, further trials were discontinued.     Recommend continue NPO status with alteranate means of nutrition/hydration/medication administration. Ice chips allowed for pleasure only following oral cares - patient requires feeding assistance. Repeat instrumental will be recommended prior to PO initiation. SLP will continue to follow for dysphagia  management. Patient's confusion and weakness appear to be biggest barriers towards diet advancement at this time.     SLP Total Evaluation Time   Evaluation, videofluoroscopic eval of swallow function Minutes (90847) 12   SLP Discharge Planning   SLP Discharge Recommendation Transitional Care Facility   SLP Rationale for DC Rec Moderate-Severe Pharyngeal Dysphagia

## 2023-10-23 NOTE — PROGRESS NOTES
Small Bowel Feeding Tube Placement Assessment  Reason for Feeding Tube Placement: Patient removal of previous NJ tube  [facility] Start Time: 1930  [facility] End Time: 1945  Medicine Delivered During Procedure: N/A  Placement Successful: Pending X-ray   Procedure Complications: N/A  Final Placement Sameer at exit of nare: 85 cm  Face to Face time with patient: 15mins

## 2023-10-23 NOTE — PROGRESS NOTES
[facility] Inpatient Dermatology Progress Note    Date of Admission: Sep 24, 2023   Encounter Date: 10/23/2023    Assessment and Plan:    1. Morbilliform (Exanthematous) Drug Eruption, possibly 2/2 cefepime    Patient's rash is significantly improved. The prior erythema of the cheeks is resolved. Denies pruritus. Wife feels that the rash is mostly gone and wondering if we can stop topicals. Of note, the patient does have uptrending eosinophils to 2.4 today. LFTs remain normal.     Clinical impression of a diffuse erythematous eruption (onset ~10/13) is rather nonspecific but most frequently represents a viral exanthem or a morbilliform drug eruption. Morbilliform or exanthematous drug eruptions usually manifest between 4 and 14 days after initiating a medication. The time to eruption may be shorter if the patient had previously been sensitized to the triggering medication (patient potentially with reaction to penicillins in the past). The most common culprits include antibiotics (penicillins and sulfas), allopurinol, anti-epileptics (phenytoin, barbiturates, carbamazepine), and NSAIDs, but many other drug culprits have been reported. A skin biopsy is not routinely necessary for the diagnosis of exanthematous drug eruption because the histopathologic findings (vacuolar interface dermatitis and tissue eosinophilia) are nonspecific. In this patient, he has been on a few recent antibiotics including vancomycin (10/1-2), metronidazole (9/25-10/3), and cefepime (9/25-10/3, 10/14-15). Cefepime would be the most suspicious culprit at this time.     For exanthematous drug eruptions, prompt withdrawal of the offending drug is the mainstay of treatment. It may take an additional 1-2 weeks after stopping the medication before the eruption completely resolves. Drug-induced hypersensitivity syndrome (DIHS; previously DRESS or Drug Reaction with Eosinophilia and Systemic Symptoms) is less favored at this time  "given quick improvement of his rash and resolved transaminitis, although it would be important to continue trending his eosinophils.      Recommendations:  - continue daily CBC with diff and CMP to rule out DIHS/DRESS (looking for new/worsening eosinophilia, transaminitis)  - okay to stop triamcinolone 0.1% ointment BID; can switch to PRN    Julee you for the dermatology consult. We will continue to follow peripherally. Please do not hesitate to contact the dermatology resident/faculty on call for any additional questions or concerns.     Staffed with attending physician, Dr. Bill Austin MD   Dermatology Resident (PGY-4)  I, Venus Khan MD, reviewed the medical record and photos of this patient with the resident and agree with the resident s findings and plan of care as documented in the resident s note.     Interval history:  - Patient seen at bedside with wife present. Patient is overall doing much better. Overall feels that the rash is nearly gone. No pruritus or discomfort related to the rash.  - Eosinophils uptrending the past few days to 2.4 today.   - LFTs remain normal    Medications:  Reviewed in epic, pertinent findings summarized as above    Physical exam:  BP (!) 144/86   Pulse 113   Temp 97.8  F (36.6  C) (Oral)   Resp 22   Ht 1.727 m (5' 8\")   Wt 74.4 kg (164 lb 0.4 oz)   SpO2 96%   BMI 24.94 kg/m    SKIN: Focused examination of the head, neck, arms, legs, upper chest, and lower abdomen was performed.  - Driscoll skin type: I  - There are faint pink macules coalescing into patches involving the trunk, arms, and thighs. (Overall much improved from prior exams)  - No other lesions of concern on areas examined.     Laboratory:  Reviewed in epic, pertinent findings summarized as above    Staff Involved:  Resident/Staff    "

## 2023-10-23 NOTE — PROGRESS NOTES
Bridle Placement:   Reason for bridle placement: Patient removal of previous placement  Medicine delivered during procedure: lubricating jelly    Procedure: Successful  Location of top of clip on FT: @ 85 cm marker   Condition of nose/skin at time of bridle placement: Unremarkable  Face to Face time with patient: 5 minutes.

## 2023-10-23 NOTE — PROGRESS NOTES
CLINICAL NUTRITION SERVICES - BRIEF NOTE     Nutrition Prescription    Recommendations already ordered by Registered Dietitian (RD):  Continue EN via NDT as ordered: Osmolite 1.5 Juvencio (or equivalent) @ goal of  50ml/hr  (1200ml/day) + 1 pkt ProSource TF20 TID (3 pkts total) provides: 2040 kcals (28 kcal/kg), 135 g PRO (1.9 g pro/kg), 914 ml free H20, 244 g CHO, and 0 g fiber daily.      Future/Additional Recommendations:  Ongoing swallow assessment per SLP; if unable to advance to PO diet, consider plan for long-term enteral access..    For last full RD assessment, see note dated 10/18/23    NEW FINDINGS   Pt pulled both NGT and NDT yesterday, Rn able to replaced NDT via Cort[facility] (AXR confirmed), TF resumed at goal after FT replaced.    VFSS completed 10/23, mod-severe pahryngeal dysphagia SLP recommending continue w/ NPO     INTERVENTIONS  Implementation  Enteral Nutrition - continue as ordered       Monitoring/Evaluation  Will continue to monitor and evaluate per protocol.    Windy Arellano, MPH, RDN, LD  4E (SICU) RD pager: [phone] Ascom: [phone]  Weekend/Holiday RD pager [phone]

## 2023-10-23 NOTE — PROGRESS NOTES
ICU End of Shift Summary. See flowsheets for vital signs and detailed assessment.    Changes this shift: Remains disoriented x2-3. Intermittently restless with BL mitts and wrist restraints in place for patient safety. PRN seroquel x1. Denies pain. ST 110s. Maintaining SBP and MAP goals without pressors. Afebrile. RA. NJ and NG placed. Verified by Xray and OK to use per SICU. NG to LIS with small amount of bile output. Passing stool via rectal tube. 600 mL urine output via straight cath. Heparin gtt infusing. Hgb 6.8 this morning, redraw ordered per SICU.    Plan: continue plan of care. Monitor neuro status. Hgb redraw. Video swallow study. HD.

## 2023-10-23 NOTE — PROGRESS NOTES
Vascular Surgery Progress Note  10/23/2023       Subjective:  Hgb in 6.6, pending new blood transfusion. LLE stump C/D/I with slight erythema along the incision line. FARIDA output 35/24hrs,  overnight via straight cath. Remains NPO as failed swallow eval, pending video swallow eval.     Objective:  Temp:  [97.4  F (36.3  C)-98.3  F (36.8  C)] 97.8  F (36.6  C)  Pulse:  [] 113  Resp:  [12-24] 22  BP: (129-154)/(75-97) 144/86  MAP:  [87 mmHg-99 mmHg] 99 mmHg  Arterial Line BP: (131-145)/(64-72) 145/72  SpO2:  [93 %-100 %] 96 %    I/O last 3 completed shifts:  In: 2097.38 [I.V.:527.38; NG/GT:470]  Out: 1695 [Urine:600; Emesis/NG output:750; Drains:45; Stool:300]      Gen: resting comfortably in bed in ICU, extubated, whispering words.  CV: off pressors, regular HR per tele   Resp: non-labored, on room air  Abd: Abdominal incision in dressing, FARIDA with serosang output (60 ml/24h), no active bleeding  Ext: RLE wwp, palpable DP pulse, LLE stump incision with surrounding erythema, no obvious bleeding, no obvious hematoma.     Labs:  Recent Labs   Lab 10/23/23  0719 10/23/23  0555 10/22/23  1309 10/22/23  0422 10/21/23  0354   WBC  --  12.6*  --  11.3* 13.9*   HGB 6.6* 6.8* 7.8* 7.1* 8.0*   PLT  --  185  --  167 159       Recent Labs   Lab 10/23/23  0555 10/23/23  0512 10/23/23  0029 10/22/23  0427 10/22/23  0422 10/21/23  0401 10/21/23  0354     --   --   --  137  --  133*   POTASSIUM 4.4  --   --   --  4.2  --  3.8   CHLORIDE 90*  --   --   --  93*  --  93*   CO2 27  --   --   --  27  --  25   .0*  --   --   --  101.0*  --  67.9*   CR 5.55*  --   --   --  4.40*  --  3.00*   * 149* 138*   < > 129*   < > 160*   OMAR 8.7*  --   --   --  8.6*  --  8.5*   MAG 2.9*  --   --   --  2.8*  --  2.0   PHOS 4.2  --   --   --  3.4  --  3.0    < > = values in this interval not displayed.      Imaging  Narrative & Impression   EXAMINATION: CT CHEST/ABDOMEN/PELVIS W CONTRAST, 10/13/2023 11:33 PM     COMPARISON  STUDY: Abdominal radiograph 10/10/2023     INDICATION: new desat episode, hypotension, eval ? infectious source     TECHNIQUE: CT scan of the chest, abdomen and pelvis was performed on  multidetector CT scanner using volumetric acquisition technique and  images were reconstructed in multiple planes with variable thickness  and reviewed on dedicated workstations.      CONTRAST: iopamidol (ISOVUE-370) solution 104 m. Without oral  contrast.     CT scan radiation dose is optimized to minimum requisite dose using  automated dose modulation techniques.     Findings:      Chest:  Lungs: Bilateral small-to-moderate pleural effusions with associated  atelectasis and ground glass opacities, left greater than right.  Basilar predominant interlobular septal thickening. No pneumothorax.     Mediastinum: Heart size is within normal limits. No pericardial  effusion. Aorta and main pulmonary artery caliber are within normal  limits No mediastinal lymphadenopathy. Small volume of air within the  left brachiocephalic vein (series 5, image 33) just prior to entering  the superior vena cava, likely iatrogenic secondary left upper  extremity central venous catheter. Enteric tube coursing the esophagus  terminating in the stomach lumen, otherwise the esophagus is  unremarkable.         Abdomen/Pelvis:    Liver: No mass. Mild intrahepatic biliary dilatation. Hypodensity at  the lateral aspect of the right hepatic lobe measuring up to 11 mm  (series 5, image 103).      Gallbladder/CBD: Mildly distended gallbladder with layering sludge.  Common bile duct within normal limits for patient's age.     Pancreas: Pancreas appears to be normal enhancing with peripancreatic  hypoattenuating collection this primarily hypoattenuating but with  areas of increased hyperattenuation. This suggests peripancreatic  fluid collection with hemorrhage.     Spleen: Surrounded by hypoattenuating fluid, otherwise unremarkable.     Adrenal glands: Normal.      Kidneys/ureters/bladder: Heterogeneous hypoattenuation of the kidneys  bilaterally with wedgelike confluent hypoattenuation in the interpolar  and superior pole of the left kidney (series series 5, images  127-156). No obstructing renal stone, hydronephrosis, renal masses.      Genitourinary/reproductive tract: Normal bladder. Reproductive organs  are within normal limits.     Gastrointestinal: Hypoattenuating of the large bowel with wall  thickening starting from the rectum and extending to the mid  transverse colon with surrounding fat stranding (160-257). Colonic  diverticulosis. Normal caliber small bowel. Appendix not seen. Stomach  and esophagus are unremarkable.     Vasculature: Postsurgical changes of ruptured AAA open repair with  mixed attenuation clot surrounding the postsurgical repairs. Narrow  lumen inferior vena cava compressed by hematoma at the level of the  kidneys. Small hematoma at the aortic bifurcation. Patent hepatic  portal vein.     Retroperitoneum/peritoneum/mesentery: Ascites throughout the  peritoneal cavity surrounding the liver or spleen, infrarenal aorta  and layering in the pelvis. 2 cm hematoma at the aortic bifurcation. 2  cm hematoma just below the level of the renal veins.     Bones: No acute or aggressive appearing osseous bodies. Ultimately  degenerative changes of the spine.     Soft tissues: Enlarged left iliacus muscle with hypodense  heterogeneous appearance measuring up to 6.6 cm (series 5, image 224).  Diffuse anasarca.                                                                      IMPRESSION:   1.  Enlarged heterogeneously hypoattenuating left illiacus muscle  measuring up to 6.6 cm. Findings may represent developing iliacus  hematoma. Consider CTA abdomen/pelvis to further evaluate for active  bleeding within the hematoma.  2.  Narrow caliber infrahepatic IVC with appearance of compression in  between thrombus secondary to AAA rupture and abdominal  aorta;  findings may represent both hypotension and potential compression  secondary to the surrounding hematoma.   3.  Bilateral, left greater than right, wedge-shaped hypodensities  suggesting infarcts.   4.  Edematous left colon. There appears to be mucosal enhancement. No  evidence of pneumatosis intestinalis or free air in the abdomen.  5.  Pancreas appears perfused but there is peripancreatic fluid with  hemorrhage. No pseudoaneurysm or extravasation identified.   6.  Diffuse intraperitoneal ascites and mixed attenuating hematoma,  along the right inferior renal aorta and aortic bifurcation, likely  related to recent AAA rupture and subsequent repair.  7.  Bilateral pleural effusions with associated atelectasis and  groundglass opacities, likely representing pulmonary edema.  8.  Diffuse anasarca.              [Urgent Result: Suspected left iliac is hematoma measuring up 6.6 cm;  suspected devascularized interpolar and superior left kidney.]     Finding was identified on 10/14/2023 12:09 PM.        Assessment/Plan:   68 year old male with PMH of HTN and previous TIA who presented with R sided abdominal pain found to have contained ruptured AAA, now s/p open AAA repair 9/24 into 9/25am with 30 min supraceliac clamp time, prolonged inter-renal clamp time, temporary abdominal closure. He did have bloody stool postop with flex sig 9/25 demonstrating ischemic mucosal changes of the rectum, repeated abdominal without evidence of transmural necrosis of the small or large intestine, or the rectum. On 9/29 he was started on CRRT given ongoing low UOP and rising Cr and failure of lasix challenge. Now on iHD. Hemodynamically continues to do well off pressors. S/p closure of abdominal fascia and subcutaneous tissue left open with wound VAC applied 10/2/23. With ischemic LLE, AKA deferred until 10/17/2023 due to leukocytosis, hypoxia requiring reintubation (10/13/23) and critical illness. ADDIS on 10/16/23 without evidence  of embolic source. 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 S/p 10/20/23 for abdominal incision closure.    Neuro:   - Appears intact: appreciate monitoring neuro status  - Stroke workup negative -- MRI with multiple small infarcts, follow-up with neurology 4-6 weeks after discharge, ok with a/c.   - ADDIS for workup 10/16/23 demonstrated no thromboembolic source or shunt identified in cardiac chambers. Grade IV atheroma in descending aorta and aortic arch   CV:   - Off pressors, midodrine 20mg daily prn with HD runs   - Goal SBP <160, MAP >65  - Labetalol prn for SBP >160  - Continue statin  - PE+, venous duplex with nonocclusive to occlusive thrombus of the left GSV from the level of the knee to the groin and nonocclusive thrombus of the left distal femoral vein and popliteal veins, increased in extent compared to 9/30/2023.   - Therapeutic Heparin gtt   - ADDIS 10/16/23 demonstrated no thromboembolic source or shunt, has grade IV atheroma in descending aorta and aortic arch   Resp:   - Extubated on room air  GI:   - Pancreatitis with peripancreatic fluid collection on CT with question of bleeding into the collection.  - Do not expect this anterior location of pancreatic fluid to be related to surgical procedure as we were in the RP and did see inferior/posterior aspect of pancreas. Fluid collection is not surrounding aortic graft, this is reassuring.  - GI signed off as collection not drainable  - TF resumed, increasing goal, tolerating  - Continue TPN   - Change abd dressing daily  - Failed SLP, okay with ice-chips administered by nursing, pending video swallow eval  FEN:   - Electrolyte replacement prn   Renal:   - Appreciate nephrology recommendations  - Continue iHD  - Tunneled line with IR placed 10/18/2023  Heme:   - Hgb 6.6 this morning, pending transfusion 1 unit PRBCs  - DVT as above, PE   - Therapeutic heparin gtt   - PT/OT ROM  ID:   - known aspiration event 10/9   - monitor signs of pneumonia  - nystatin  swish for oral candida  - improving leukocytosis, downtrending, monitor for now.   MSK:   - L AKA 10/17/23: will apply prevena-like suction this afternoon 10/23/23  Derm:   - Has rash on abdomen, trunk, anterior bilateral thighs, erythematous, blanching, however maculopapular does not seem consistent with cellulitis  - Pharmacy reviewed medications for possible causes of rash and felt all were relatively low risk however not 0 risk   - benadryl cream topically applied with no improvement  - trialed abd binder off for short time to see if improvement (?moisture rash), none seen.   - Appreciate dermatology recs, likely morbilliform eruption vs. Low concern for possible DRESS   - CBC with diff daily stable   - LFTs daily    - triamcinolone 0.1% twice daily to areas w/ rash    - switched cefepime to levofloxacin  Misc:   - abd binder on at all times.  - Heparin gtt    Discussed patient with Dr. Lowe, vascular surgery staff

## 2023-10-23 NOTE — PROGRESS NOTES
HEMODIALYSIS TREATMENT NOTE    Date: 10/23/2023  Time: 2:02 PM    Data:  Pre Wt: 74.4 kg (164 lb 0.4 oz)   Desired Wt: 71.4  kg   Post Wt: 72.3 kg calculated  Weight change: 2.1kg  Ultrafiltration - Post Run Net Total Removed (mL): 2100 mL  Vascular Access Status: patent  Dialyzer Rinse: Clear, Streaked  Total Blood Volume Processed: 84.4 L Liters  Total Dialysis (Treatment) Time: 4 Hours    Lab:   No    Interventions:  Epoetin  CVC dressing changed  Hypotension during tx, UF off, 150 NS, extra 150 NS, midodrine  Titrate UF goal up to 2.1 L net  Frequent arterial alarming, lines reversed, lines unreversed, HOB lowered, HOB raised, pt repositioned, -400, approximately 500 NS admin for clearing arterial alarm  1 unit RBC transfusion    Assessment:  Pt ran for 4 hrs on a K3/ Ca 2.25 bath with a -400/  and 2.1 L pulled. RCVC patent both lumens with good flow. CVC alarming frequently during tx, tx extended 25 mins from alarming. 15 mins into tx, BP dropped SBP 70-80s, see interventions and flowsheets for details. 1 unit RBC transfusion during tx, no signs of transfusion reaction during tx. Pt rinsed back, CVC NS locked, and caps changed. CVC dressing new drainage, dressing changed. Pt alert and oriented x3, intermittent confusion, pulling at HD lines, Soft restraint R and L wrist on during tx, no signs of circulation injury. Report given to PCN.     Plan:    Per renal team

## 2023-10-23 NOTE — PROGRESS NOTES
SPIRITUAL HEALTH SERVICES Progress Note  [facility] ([place]) 4C    I provided a follow up visit for Alfredo who was accompanied by his wife, Tabby. Alfredo was tired from receiving dialysis. Tabby reported that they received anointing and healing touch and she felt that spiritually they were doing okay. She did not have any further spiritual health need at this time.     I let them know that if they want a  to return for any reason in the future to please let their nurse know.     Monico Murphy MDiv  Associate   Office Phone [phone]  Pager [phone]      * [facility] remains available 24/7 for emergent requests/referrals, either by having the switchboard page the on-call  or by entering an ASAP/STAT consult in Epic (this will also page the on-call ). Routine Epic consults receive an initial response within 24 hours.*

## 2023-10-24 ENCOUNTER — APPOINTMENT (OUTPATIENT)
Dept: CT IMAGING | Facility: CLINIC | Age: 68
DRG: 268 | End: 2023-10-24
Attending: NURSE PRACTITIONER
Payer: COMMERCIAL

## 2023-10-24 ENCOUNTER — APPOINTMENT (OUTPATIENT)
Dept: SPEECH THERAPY | Facility: CLINIC | Age: 68
DRG: 268 | End: 2023-10-24
Payer: COMMERCIAL

## 2023-10-24 ENCOUNTER — APPOINTMENT (OUTPATIENT)
Dept: GENERAL RADIOLOGY | Facility: CLINIC | Age: 68
DRG: 268 | End: 2023-10-24
Attending: NURSE PRACTITIONER
Payer: COMMERCIAL

## 2023-10-24 ENCOUNTER — APPOINTMENT (OUTPATIENT)
Dept: GENERAL RADIOLOGY | Facility: CLINIC | Age: 68
DRG: 268 | End: 2023-10-24
Payer: COMMERCIAL

## 2023-10-24 LAB
ALBUMIN SERPL BCG-MCNC: 3 G/DL (ref 3.5–5.2)
ALP SERPL-CCNC: 81 U/L (ref 40–129)
ALT SERPL W P-5'-P-CCNC: 23 U/L (ref 0–70)
ANION GAP SERPL CALCULATED.3IONS-SCNC: 12 MMOL/L (ref 7–15)
AST SERPL W P-5'-P-CCNC: 28 U/L (ref 0–45)
BASE EXCESS BLDV CALC-SCNC: 3.5 MMOL/L (ref -7.7–1.9)
BASOPHILS # BLD AUTO: ABNORMAL 10*3/UL
BASOPHILS # BLD MANUAL: 0.1 10E3/UL (ref 0–0.2)
BASOPHILS NFR BLD AUTO: ABNORMAL %
BASOPHILS NFR BLD MANUAL: 1 %
BILIRUB DIRECT SERPL-MCNC: 0.24 MG/DL (ref 0–0.3)
BILIRUB SERPL-MCNC: 0.5 MG/DL
BLD PROD TYP BPU: NORMAL
BLD PROD TYP BPU: NORMAL
BLOOD COMPONENT TYPE: NORMAL
BLOOD COMPONENT TYPE: NORMAL
BUN SERPL-MCNC: 55.9 MG/DL (ref 8–23)
CA-I BLD-MCNC: 4.3 MG/DL (ref 4.4–5.2)
CALCIUM SERPL-MCNC: 8.4 MG/DL (ref 8.8–10.2)
CHLORIDE SERPL-SCNC: 97 MMOL/L (ref 98–107)
CODING SYSTEM: NORMAL
CODING SYSTEM: NORMAL
CREAT SERPL-MCNC: 3.13 MG/DL (ref 0.67–1.17)
CREAT SERPL-MCNC: 3.27 MG/DL (ref 0.67–1.17)
CROSSMATCH: NORMAL
CROSSMATCH: NORMAL
DEPRECATED HCO3 PLAS-SCNC: 25 MMOL/L (ref 22–29)
EGFRCR SERPLBLD CKD-EPI 2021: 20 ML/MIN/1.73M2
EGFRCR SERPLBLD CKD-EPI 2021: 21 ML/MIN/1.73M2
EOSINOPHIL # BLD AUTO: ABNORMAL 10*3/UL
EOSINOPHIL # BLD MANUAL: 1.9 10E3/UL (ref 0–0.7)
EOSINOPHIL NFR BLD AUTO: ABNORMAL %
EOSINOPHIL NFR BLD MANUAL: 15 %
ERYTHROCYTE [DISTWIDTH] IN BLOOD BY AUTOMATED COUNT: 17.5 % (ref 10–15)
ERYTHROCYTE [DISTWIDTH] IN BLOOD BY AUTOMATED COUNT: 17.5 % (ref 10–15)
GLUCOSE BLDC GLUCOMTR-MCNC: 115 MG/DL (ref 70–99)
GLUCOSE BLDC GLUCOMTR-MCNC: 118 MG/DL (ref 70–99)
GLUCOSE BLDC GLUCOMTR-MCNC: 121 MG/DL (ref 70–99)
GLUCOSE BLDC GLUCOMTR-MCNC: 136 MG/DL (ref 70–99)
GLUCOSE BLDC GLUCOMTR-MCNC: 162 MG/DL (ref 70–99)
GLUCOSE SERPL-MCNC: 124 MG/DL (ref 70–99)
HCO3 BLDV-SCNC: 27 MMOL/L (ref 21–28)
HCT VFR BLD AUTO: 18.1 % (ref 40–53)
HCT VFR BLD AUTO: 18.2 % (ref 40–53)
HGB BLD-MCNC: 5.8 G/DL (ref 13.3–17.7)
HGB BLD-MCNC: 5.9 G/DL (ref 13.3–17.7)
HGB BLD-MCNC: 8.1 G/DL (ref 13.3–17.7)
HGB BLD-MCNC: 8.1 G/DL (ref 13.3–17.7)
IMM GRANULOCYTES # BLD: ABNORMAL 10*3/UL
IMM GRANULOCYTES NFR BLD: ABNORMAL %
ISSUE DATE AND TIME: NORMAL
ISSUE DATE AND TIME: NORMAL
LACTATE SERPL-SCNC: 2.5 MMOL/L (ref 0.7–2)
LYMPHOCYTES # BLD AUTO: ABNORMAL 10*3/UL
LYMPHOCYTES # BLD MANUAL: 0.5 10E3/UL (ref 0.8–5.3)
LYMPHOCYTES NFR BLD AUTO: ABNORMAL %
LYMPHOCYTES NFR BLD MANUAL: 4 %
MAGNESIUM SERPL-MCNC: 2.1 MG/DL (ref 1.7–2.3)
MCH RBC QN AUTO: 30.4 PG (ref 26.5–33)
MCH RBC QN AUTO: 30.7 PG (ref 26.5–33)
MCHC RBC AUTO-ENTMCNC: 32 G/DL (ref 31.5–36.5)
MCHC RBC AUTO-ENTMCNC: 32.4 G/DL (ref 31.5–36.5)
MCV RBC AUTO: 95 FL (ref 78–100)
MCV RBC AUTO: 95 FL (ref 78–100)
METAMYELOCYTES # BLD MANUAL: 0.1 10E3/UL
METAMYELOCYTES NFR BLD MANUAL: 1 %
MONOCYTES # BLD AUTO: ABNORMAL 10*3/UL
MONOCYTES # BLD MANUAL: 0.2 10E3/UL (ref 0–1.3)
MONOCYTES NFR BLD AUTO: ABNORMAL %
MONOCYTES NFR BLD MANUAL: 2 %
NEUTROPHILS # BLD AUTO: ABNORMAL 10*3/UL
NEUTROPHILS # BLD MANUAL: 9.5 10E3/UL (ref 1.6–8.3)
NEUTROPHILS NFR BLD AUTO: ABNORMAL %
NEUTROPHILS NFR BLD MANUAL: 77 %
NRBC # BLD AUTO: 0 10E3/UL
NRBC BLD AUTO-RTO: 0 /100
O2/TOTAL GAS SETTING VFR VENT: 21 %
OXYHGB MFR BLDV: 76 % (ref 70–75)
PATH REPORT.ADDENDUM SPEC: NORMAL
PATH REPORT.COMMENTS IMP SPEC: NORMAL
PATH REPORT.FINAL DX SPEC: NORMAL
PATH REPORT.GROSS SPEC: NORMAL
PATH REPORT.MICROSCOPIC SPEC OTHER STN: NORMAL
PATH REPORT.RELEVANT HX SPEC: NORMAL
PCO2 BLDV: 35 MM HG (ref 40–50)
PH BLDV: 7.5 [PH] (ref 7.32–7.43)
PHOSPHATE SERPL-MCNC: 3.7 MG/DL (ref 2.5–4.5)
PHOTO IMAGE: NORMAL
PLAT MORPH BLD: ABNORMAL
PLATELET # BLD AUTO: 142 10E3/UL (ref 150–450)
PLATELET # BLD AUTO: 144 10E3/UL (ref 150–450)
PO2 BLDV: 41 MM HG (ref 25–47)
POTASSIUM SERPL-SCNC: 4.8 MMOL/L (ref 3.4–5.3)
PROT SERPL-MCNC: 5.5 G/DL (ref 6.4–8.3)
RADIOLOGIST FLAGS: ABNORMAL
RBC # BLD AUTO: 1.91 10E6/UL (ref 4.4–5.9)
RBC # BLD AUTO: 1.92 10E6/UL (ref 4.4–5.9)
RBC MORPH BLD: ABNORMAL
SODIUM SERPL-SCNC: 134 MMOL/L (ref 135–145)
UFH PPP CHRO-ACNC: 0.74 IU/ML
UNIT ABO/RH: NORMAL
UNIT ABO/RH: NORMAL
UNIT NUMBER: NORMAL
UNIT NUMBER: NORMAL
UNIT STATUS: NORMAL
UNIT STATUS: NORMAL
UNIT TYPE ISBT: 5100
UNIT TYPE ISBT: 5100
WBC # BLD AUTO: 12.3 10E3/UL (ref 4–11)
WBC # BLD AUTO: 12.4 10E3/UL (ref 4–11)

## 2023-10-24 PROCEDURE — 85018 HEMOGLOBIN: CPT | Performed by: NURSE PRACTITIONER

## 2023-10-24 PROCEDURE — 44500 INTRO GASTROINTESTINAL TUBE: CPT

## 2023-10-24 PROCEDURE — 250N000011 HC RX IP 250 OP 636: Performed by: SURGERY

## 2023-10-24 PROCEDURE — 83735 ASSAY OF MAGNESIUM: CPT | Performed by: PHYSICIAN ASSISTANT

## 2023-10-24 PROCEDURE — 85520 HEPARIN ASSAY: CPT | Performed by: SURGERY

## 2023-10-24 PROCEDURE — 82565 ASSAY OF CREATININE: CPT | Performed by: NURSE PRACTITIONER

## 2023-10-24 PROCEDURE — 82330 ASSAY OF CALCIUM: CPT | Performed by: PHYSICIAN ASSISTANT

## 2023-10-24 PROCEDURE — 85027 COMPLETE CBC AUTOMATED: CPT | Performed by: NURSE PRACTITIONER

## 2023-10-24 PROCEDURE — 74174 CTA ABD&PLVS W/CONTRAST: CPT

## 2023-10-24 PROCEDURE — 250N000013 HC RX MED GY IP 250 OP 250 PS 637

## 2023-10-24 PROCEDURE — 80053 COMPREHEN METABOLIC PANEL: CPT

## 2023-10-24 PROCEDURE — 85018 HEMOGLOBIN: CPT

## 2023-10-24 PROCEDURE — 84100 ASSAY OF PHOSPHORUS: CPT | Performed by: PHYSICIAN ASSISTANT

## 2023-10-24 PROCEDURE — 999N000065 XR ABDOMEN PORT 1 VIEW

## 2023-10-24 PROCEDURE — 250N000011 HC RX IP 250 OP 636: Mod: JZ | Performed by: STUDENT IN AN ORGANIZED HEALTH CARE EDUCATION/TRAINING PROGRAM

## 2023-10-24 PROCEDURE — 93010 ELECTROCARDIOGRAM REPORT: CPT | Performed by: INTERNAL MEDICINE

## 2023-10-24 PROCEDURE — 83605 ASSAY OF LACTIC ACID: CPT | Performed by: PHYSICIAN ASSISTANT

## 2023-10-24 PROCEDURE — 250N000009 HC RX 250: Performed by: NURSE PRACTITIONER

## 2023-10-24 PROCEDURE — 82805 BLOOD GASES W/O2 SATURATION: CPT | Performed by: STUDENT IN AN ORGANIZED HEALTH CARE EDUCATION/TRAINING PROGRAM

## 2023-10-24 PROCEDURE — 36415 COLL VENOUS BLD VENIPUNCTURE: CPT | Performed by: STUDENT IN AN ORGANIZED HEALTH CARE EDUCATION/TRAINING PROGRAM

## 2023-10-24 PROCEDURE — 99233 SBSQ HOSP IP/OBS HIGH 50: CPT | Performed by: CLINICAL NURSE SPECIALIST

## 2023-10-24 PROCEDURE — 71275 CT ANGIOGRAPHY CHEST: CPT | Mod: 26 | Performed by: RADIOLOGY

## 2023-10-24 PROCEDURE — 93005 ELECTROCARDIOGRAM TRACING: CPT

## 2023-10-24 PROCEDURE — 85007 BL SMEAR W/DIFF WBC COUNT: CPT

## 2023-10-24 PROCEDURE — P9016 RBC LEUKOCYTES REDUCED: HCPCS | Performed by: NURSE PRACTITIONER

## 2023-10-24 PROCEDURE — 250N000013 HC RX MED GY IP 250 OP 250 PS 637: Performed by: SURGERY

## 2023-10-24 PROCEDURE — 36415 COLL VENOUS BLD VENIPUNCTURE: CPT

## 2023-10-24 PROCEDURE — 92526 ORAL FUNCTION THERAPY: CPT | Mod: GN | Performed by: SPEECH-LANGUAGE PATHOLOGIST

## 2023-10-24 PROCEDURE — 250N000013 HC RX MED GY IP 250 OP 250 PS 637: Performed by: NURSE PRACTITIONER

## 2023-10-24 PROCEDURE — 74018 RADEX ABDOMEN 1 VIEW: CPT | Mod: 26 | Performed by: RADIOLOGY

## 2023-10-24 PROCEDURE — 74174 CTA ABD&PLVS W/CONTRAST: CPT | Mod: 26 | Performed by: RADIOLOGY

## 2023-10-24 PROCEDURE — 82248 BILIRUBIN DIRECT: CPT

## 2023-10-24 PROCEDURE — 200N000002 HC R&B ICU UMMC

## 2023-10-24 PROCEDURE — 99024 POSTOP FOLLOW-UP VISIT: CPT | Performed by: NURSE PRACTITIONER

## 2023-10-24 PROCEDURE — 85027 COMPLETE CBC AUTOMATED: CPT

## 2023-10-24 PROCEDURE — 36415 COLL VENOUS BLD VENIPUNCTURE: CPT | Performed by: NURSE PRACTITIONER

## 2023-10-24 RX ORDER — IOPAMIDOL 755 MG/ML
90 INJECTION, SOLUTION INTRAVASCULAR ONCE
Status: COMPLETED | OUTPATIENT
Start: 2023-10-24 | End: 2023-10-24

## 2023-10-24 RX ORDER — QUETIAPINE FUMARATE 25 MG/1
100 TABLET, FILM COATED ORAL AT BEDTIME
Status: DISCONTINUED | OUTPATIENT
Start: 2023-10-24 | End: 2023-11-17 | Stop reason: HOSPADM

## 2023-10-24 RX ORDER — IOPAMIDOL 755 MG/ML
90 INJECTION, SOLUTION INTRAVASCULAR ONCE
Status: DISCONTINUED | OUTPATIENT
Start: 2023-10-24 | End: 2023-10-24 | Stop reason: DRUGHIGH

## 2023-10-24 RX ORDER — TRIAMCINOLONE ACETONIDE 1 MG/G
OINTMENT TOPICAL 2 TIMES DAILY PRN
Status: DISCONTINUED | OUTPATIENT
Start: 2023-10-24 | End: 2023-10-29

## 2023-10-24 RX ORDER — LIDOCAINE HYDROCHLORIDE 20 MG/ML
JELLY TOPICAL ONCE
Status: COMPLETED | OUTPATIENT
Start: 2023-10-24 | End: 2023-10-24

## 2023-10-24 RX ADMIN — HYDROCORTISONE: 25 CREAM TOPICAL at 20:20

## 2023-10-24 RX ADMIN — HYDROCORTISONE: 25 CREAM TOPICAL at 07:55

## 2023-10-24 RX ADMIN — ATORVASTATIN CALCIUM 10 MG: 10 TABLET, FILM COATED ORAL at 20:20

## 2023-10-24 RX ADMIN — TRIAMCINOLONE ACETONIDE: 1 OINTMENT TOPICAL at 20:20

## 2023-10-24 RX ADMIN — ACETAMINOPHEN 975 MG: 325 TABLET, FILM COATED ORAL at 04:47

## 2023-10-24 RX ADMIN — Medication 40 MG: at 07:55

## 2023-10-24 RX ADMIN — LIDOCAINE HYDROCHLORIDE: 20 JELLY TOPICAL at 11:24

## 2023-10-24 RX ADMIN — TRIAMCINOLONE ACETONIDE: 1 OINTMENT TOPICAL at 07:56

## 2023-10-24 RX ADMIN — QUETIAPINE FUMARATE 25 MG: 25 TABLET ORAL at 10:55

## 2023-10-24 RX ADMIN — ACETAMINOPHEN 975 MG: 325 TABLET, FILM COATED ORAL at 17:30

## 2023-10-24 RX ADMIN — ACETAMINOPHEN 975 MG: 325 TABLET, FILM COATED ORAL at 10:55

## 2023-10-24 RX ADMIN — QUETIAPINE FUMARATE 25 MG: 25 TABLET ORAL at 02:34

## 2023-10-24 RX ADMIN — INSULIN ASPART 1 UNITS: 100 INJECTION, SOLUTION INTRAVENOUS; SUBCUTANEOUS at 23:59

## 2023-10-24 RX ADMIN — ACETAMINOPHEN 975 MG: 325 TABLET, FILM COATED ORAL at 22:25

## 2023-10-24 RX ADMIN — Medication 15 ML: at 07:55

## 2023-10-24 RX ADMIN — INSULIN ASPART 1 UNITS: 100 INJECTION, SOLUTION INTRAVENOUS; SUBCUTANEOUS at 00:04

## 2023-10-24 RX ADMIN — IOPAMIDOL 90 ML: 755 INJECTION, SOLUTION INTRAVENOUS at 08:47

## 2023-10-24 RX ADMIN — QUETIAPINE FUMARATE 100 MG: 100 TABLET ORAL at 22:24

## 2023-10-24 RX ADMIN — ONDANSETRON 4 MG: 2 INJECTION INTRAMUSCULAR; INTRAVENOUS at 11:10

## 2023-10-24 RX ADMIN — Medication 1 MG: at 17:31

## 2023-10-24 ASSESSMENT — ACTIVITIES OF DAILY LIVING (ADL)
ADLS_ACUITY_SCORE: 51

## 2023-10-24 NOTE — PROGRESS NOTES
Vascular Surgery Progress Note  10/24/2023       Subjective:  Failed video swallow eval, pulled NJ and NGT overnight, new NG tube placed. iHD remains without events, received 1 unit PRBCs yesterday repeat hgb last night was 7.9, however, hgb 5.8 and 5.9 this morning. Prevena placed on LLE stump, drained 200ml at midnight and 250ml since MN. Not sleeping even with precedex and seroquel at night. CTA stat to assess for potential bleeding, results pending. Heparin drip stopped this morning.    Objective:  Temp:  [97.4  F (36.3  C)-99.8  F (37.7  C)] 99.8  F (37.7  C)  Pulse:  [] 102  Resp:  [0-24] 12  BP: ()/() 100/66  SpO2:  [94 %-100 %] 96 %    I/O last 3 completed shifts:  In: 2485.93 [I.V.:630.93; NG/GT:505]  Out: 3490 [Urine:600; Emesis/NG output:350; Drains:240; Other:2100; Stool:200]      Gen: resting comfortably in bed in ICU, answering questions, whispering words, not in acute distress  CV: off pressors, regular HR per tele   Resp: non-labored, on room air  Abd: Abdominal incision in dressing, FARIDA with serosang output (40 ml/24h), no active bleeding, abdomen soft, non-tender  Ext: RLE wwp, palpable DP pulse, LLE stump incision with general surrounding erythema, no obvious bleeding, no obvious hematoma.   Prevena LLE placed on 10/23//23 - to be replaced in 5-7 days    Labs:  Recent Labs   Lab 10/23/23  1849 10/23/23  0719 10/23/23  0555 10/22/23  1309 10/22/23  0422 10/21/23  0354   WBC  --   --  12.6*  --  11.3* 13.9*   HGB 7.9* 6.6* 6.8*   < > 7.1* 8.0*   PLT  --   --  185  --  167 159    < > = values in this interval not displayed.       Recent Labs   Lab 10/24/23  0353 10/24/23  0003 10/23/23  2041 10/23/23  1143 10/23/23  0555 10/22/23  0427 10/22/23  0422 10/21/23  0401 10/21/23  0354   NA  --   --   --   --  135  --  137  --  133*   POTASSIUM  --   --   --   --  4.4  --  4.2  --  3.8   CHLORIDE  --   --   --   --  90*  --  93*  --  93*   CO2  --   --   --   --  27  --  27  --  25   BUN   --   --   --   --  120.0*  --  101.0*  --  67.9*   CR  --   --   --   --  5.55*  --  4.40*  --  3.00*   * 162* 151*   < > 153*   < > 129*   < > 160*   OMAR  --   --   --   --  8.7*  --  8.6*  --  8.5*   MAG  --   --   --   --  2.9*  --  2.8*  --  2.0   PHOS  --   --   --   --  4.2  --  3.4  --  3.0    < > = values in this interval not displayed.      Imaging reviewed.    Assessment/Plan:   68 year old male with PMH of HTN and previous TIA who presented with R sided abdominal pain found to have contained ruptured AAA, now s/p open AAA repair 9/24 into 9/25am with 30 min supraceliac clamp time, prolonged inter-renal clamp time, temporary abdominal closure. He did have bloody stool postop with flex sig 9/25 demonstrating ischemic mucosal changes of the rectum, repeated abdominal without evidence of transmural necrosis of the small or large intestine, or the rectum. On 9/29 he was started on CRRT given ongoing low UOP and rising Cr and failure of lasix challenge. Now on iHD. Hemodynamically continues to do well off pressors. S/p closure of abdominal fascia and subcutaneous tissue left open with wound VAC applied 10/2/23. With ischemic LLE, AKA deferred until 10/17/2023 due to leukocytosis, hypoxia requiring reintubation (10/13/23) and critical illness. ADDIS on 10/16/23 without evidence of embolic source. 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 On 10/20/23 underwent abdominal incision closure. On 10/24/23 found to have hgb 5.8 and 5.9 on repeat, STAT CTA pending, heparin drip on hold and transfusing 2 units PRBCs. Abdomen remains soft on palpation without pain per patient.     Neuro:   - Appears intact: appreciate monitoring neuro status  - Stroke workup negative -- MRI with multiple small infarcts, follow-up with neurology 4-6 weeks after discharge, ok with a/c.   - ADDIS for workup 10/16/23 demonstrated no thromboembolic source or shunt identified in cardiac chambers. Grade IV atheroma in descending aorta and aortic arch    CV:   - Off pressors, midodrine 20mg daily prn with HD runs   - Goal SBP <160, MAP >65  - Labetalol prn for SBP >160  - Continue statin  - PE+, venous duplex with nonocclusive to occlusive thrombus of the left GSV from the level of the knee to the groin and nonocclusive thrombus of the left distal femoral vein and popliteal veins, increased in extent compared to 9/30/2023.   - Therapeutic Heparin gtt on hold 10/24/23 given potential bleeding  - ADDIS 10/16/23 demonstrated no thromboembolic source or shunt, has grade IV atheroma in descending aorta and aortic arch   Resp:   - Extubated on room air  GI:   - Pancreatitis with peripancreatic fluid collection on CT with question of bleeding into the collection.  - Do not expect this anterior location of pancreatic fluid to be related to surgical procedure as we were in the RP and did see inferior/posterior aspect of pancreas. Fluid collection is not surrounding aortic graft, this is reassuring.  - GI signed off as collection not drainable  - TF tolerating (pulled NJT, pending new tube placement later today)  - Continue TPN   - Change abd dressing daily  - Failed SLP, okay with ice-chips administered by nursing with observation, failed video swallow eval  FEN:   - Electrolyte replacement prn   Renal:   - Appreciate nephrology recommendations  - Continue iHD  - Tunneled line with IR placed 10/18/2023  Heme:   - Hgb 5.8 this morning, pending transfusion 2 units PRBCs  - DVT as above, PE   - Therapeutic heparin gtt on hold 10/24/23 due to potential bleeding as hg  dropped to 5.9 this morning.  - CTA chest abdomen pelvis stat to assess potential bleeding, results pending   - PT/OT ROM  ID:   - known aspiration event 10/9   - monitor signs of pneumonia  - nystatin swish for oral candida  - stable leukocytosis, monitor for now, afebrile.   MSK:   - L AKA 10/17/23: prevena applied 10/23/23  Derm:   - Has rash on abdomen, trunk, anterior bilateral thighs, erythematous, blanching,  however maculopapular does not seem consistent with cellulitis  - Pharmacy reviewed medications for possible causes of rash and felt all were relatively low risk however not 0 risk   - benadryl cream topically applied with no improvement  - trialed abd binder off for short time to see if improvement (?moisture rash), none seen.   - Appreciate dermatology recs, likely morbilliform eruption vs. Low concern for possible DRESS   - CBC with diff daily stable   - LFTs daily    - triamcinolone 0.1% PRN  Misc:   - abd binder on at all times.    ADDENDUM: CTA from this morning demonstrated left psoas muscle hematoma now size that 6 cm x 3 cm x 3 cm, barely seen on CTA from 1013.  Lesser sac of the peritoneal cavity collection stable, hematoma in left iliac stable, no active extravasation.  We will continue to hold heparin drip for now.    Discussed patient with Dr. oLwe, vascular surgery staff    Miryam Carrasco CNP  Vascular Surgery  Pager: [phone]  [email or web address]  Send message or 10 digit call back number Securely via [employer] with the [employer] Web Console (learn more here)

## 2023-10-24 NOTE — PLAN OF CARE
Goal Outcome Evaluation:  .ICU End of Shift Summary. See flowsheets for vital signs and detailed assessment.    Changes this shift:   Pt is waxing and waning, oriented to self, has not slept with Presdex started from [id number]. Remains restless with BL wrist restraints and mitts in place for pt safety. Bladder scanned for 12 mL, and good stool output. CVC site bleeding has slowed down, with quickclot and stat seal. Heparin gtt @ 2200 units/hr.     Pulled out NJ & NG. Replaced NG at 65, verified by xray okay to use    Plan:  Follow plan of care and notify provider of changes.                  Outcome Evaluation: Remian delirious, started presdex for sleep overnight

## 2023-10-24 NOTE — PLAN OF CARE
ICU End of Shift Summary. See flowsheets for vital signs and detailed assessment.    Changes this shift: Alert, intermittently disoriented, following all commands.   AM hgb 5.8, 5.9 on recheck, 2 unit RBC given. CTA done, psoas hematoma noted. Heparin to be held until team can reevaluate tomorrow morning. hgb recheck at 1300, 8.1. New post pyloric placed, TF resumed  HR increased throughout shift, Vascular notified, stat hgb drawn, awaiting results    Plan: Follow up on evening hgb check

## 2023-10-24 NOTE — PROGRESS NOTES
CLINICAL NUTRITION SERVICES - BRIEF NOTE  *Please see full assessment note from 10/18/2023    New Findings:  Notified by RN of pt self removal of NDT. Discussed with primary team, RD to replace NDT. Per RN, no plans for GJ tube due to surgeries. Note pt has removed NDT x 3 despite nasal bridles.     Interventions  Collaboration with other providers- primary team, RN  Enteral Nutrition - once AXR confirms feeding tube position, start at goal rate.     RD to follow per protocol.    Andie Sy MS, RD, LD, [facility]  MICU pager: [phone]  ASCOM: [id number]  Weekend/Holiday pager: [phone]

## 2023-10-24 NOTE — PROCEDURES
Small Bowel Feeding Tube Placement Assessment  Reason for Feeding Tube Placement: post pyloric enteral access for nutrition and medication administration  Cortrak Start Time: 11:30   Cortrak End Time: 11:45  Medicine Delivered During Procedure: 2% viscous lidocaine gel   Placement Successful: yes per Cortrak tracing pending AXR confirmation      Procedure Complications: none  Final Placement Sameer at exit of nare:  92 cm  Face to Face time with patient: 20 minutes    Bridle Placement:   Reason for bridle placement: securement of nasoenteric feeding tube    Medicine delivered during procedure: lidocaine gel   Procedure: Successful   Location of top of clip on FT: @ 93 cm marker   Condition of nose/skin at time of bridle placement: Unremarkable, NGT in other nare   Face to Face time with patient: < 5 minutes.    Andie Sy, MS, RD, LD, [facility]  MICU pager: [phone]  ASCOM: [id number]  Weekend/Holiday pager: [phone]

## 2023-10-24 NOTE — PROGRESS NOTES
Nephrology Progress Note  10/24/2023       Alfredo Burnham is a 68 yom with complex hx of TIA, HTN, blindness who presented to [facility] 9/24 with severe abdominal pain radiating to flank and back, CT revealed AAA with a contained rupture.  Taken to OR for aortobiiliac bypass and resection of ruptured pararenal aneurysm.   Post op course complicated by hypotension requiring pressors and metabolic acidosis.  Baseline Cr 1.0 on presentation but on the rise to >5 at time of renal consult for MAYA management and possible RRT.       Interval History :   Mr Burnham had CRRT stopped 10/4, generally has been stable on iHD since then but had resp arrest immediately post HD run on 10/13 requiring return to ICU and intubation, now re-extubated and doing well.  Made UOP yesterday morning but had labs suggesting he needed so we did run HD with 2.1L of UF.  Hgb very low this am, received contrast for CT to evaluate, in retrospect his BUN of 120 yesterday may have had some GIB contribution but will see what workup shows.  Plan for likely HD tomorrow.      Assessment & Recommendations:   MAYA-Baseline Cr 1.0, ordered UA.  CT showed benign cyst but otherwise normal kidneys.  Cr on the rise since surgery, did receive contrast for CTA.  Urine microscopy showed granular casts suggesting ATN which will recover with time and stabilizing hemodynamics.  Started on CRRT 9/30 for volume and clearance with minimal UOP and rising Cr.                  -Started CRRT 9/30 for volume and clearance, stopped 10/4 and now running iHD PRN (generally on MWF) and watching for recovery.  Infarcts seen on CT 10/13 lowers chances of recovery.       -Appreciate IR placing tunneled line RIJ 10/18.                  -Dialysis consent signed and scanned into media tab.      Volume-Wt on downtrend overall but up the past 2 days without HD, BP's reasonable overnight and has some edema so will plan to pull 2-3L to account for gains the past ~48h.   "    Electrolytes-K 4.8, bicarb 25, Na 134.       BMD-Ca 8.4, Mg 2.1.  Phos 3.7, no issues.        Anemia-Hgb 5.8, evaluating for bleeding.   ~14 on presentation, acute management per team.       Nutrition-On Osmolite TF.       Time spent: 40 minutes on this date of encounter for chart review, physical exam, medical decision making and co-ordination of care.       Recommendations were communicated to primary team via verbal communication.        ALTAGRACIA Jiménez CNS  Clinical Nurse Specialist  [phone]    Review of Systems:   I reviewed the following systems:  ROS not done due to lethargy    Physical Exam:   I/O last 3 completed shifts:  In: 2317.44 [I.V.:692.44; NG/GT:475]  Out: 3085 [Emesis/NG output:300; Drains:535; Other:2100; Stool:150]   BP (!) 144/82   Pulse 120   Temp 99.6  F (37.6  C) (Axillary)   Resp 20   Ht 1.727 m (5' 8\")   Wt 74.5 kg (164 lb 3.9 oz)   SpO2 93%   BMI 24.97 kg/m       GENERAL APPEARANCE: Extubated, lethargic, in no distress.   EYES: No scleral icterus  Pulmonary: On vent 40%/8 of PEEP  CV: Regular rhythm, normal rate   - Edema +1-2 generalized, + scrotal edema.    GI: distended, nontender  MS: no evidence of inflammation in joints, no muscle tenderness  : No Lu  SKIN: no rash, warm, dry  NEURO: No focal deficits.         Labs:   All labs reviewed by me  Electrolytes/Renal -   Recent Labs   Lab Test 10/24/23  0910 10/24/23  0651 10/24/23  0353 10/24/23  0003 10/23/23  1143 10/23/23  0555 10/22/23  0427 10/22/23  0422   NA  --  134*  --   --   --  135  --  137   POTASSIUM  --  4.8  --   --   --  4.4  --  4.2   CHLORIDE  --  97*  --   --   --  90*  --  93*   CO2  --  25  --   --   --  27  --  27   BUN  --  55.9*  --   --   --  120.0*  --  101.0*   CR 3.27* 3.13*  --   --   --  5.55*  --  4.40*   GLC  --  124* 121* 162*   < > 153*   < > 129*   OMAR  --  8.4*  --   --   --  8.7*  --  8.6*   MAG  --  2.1  --   --   --  2.9*  --  2.8*   PHOS  --  3.7  --   --   --  4.2  --  3.4 "    < > = values in this interval not displayed.       CBC -   Recent Labs   Lab Test 10/24/23  0756 10/24/23  0651 10/23/23  1849 10/23/23  0719 10/23/23  0555   WBC 12.3* 12.4*  --   --  12.6*   HGB 5.9* 5.8* 7.9*   < > 6.8*   * 142*  --   --  185    < > = values in this interval not displayed.       LFTs -   Recent Labs   Lab Test 10/24/23  0651 10/23/23  0555 10/22/23  0422   ALKPHOS 81 99 110   BILITOTAL 0.5 0.5 0.4   ALT 23 25 31   AST 28 25 30   PROTTOTAL 5.5* 5.6* 5.7*   ALBUMIN 3.0* 3.0* 3.1*       Iron Panel - No lab results found.        Current Medications:   acetaminophen  975 mg Oral or Feeding Tube Q6H    atorvastatin  10 mg Oral or Feeding Tube QPM    hydrocortisone   Topical BID    insulin aspart  1-12 Units Subcutaneous Q4H    melatonin  1 mg Oral or Feeding Tube QPM    multivitamins w/minerals  15 mL Per Feeding Tube Daily    pantoprazole  40 mg Oral or Feeding Tube QAM AC    protein modular  1 packet Per Feeding Tube TID    QUEtiapine  100 mg Oral or Feeding Tube At Bedtime    sodium chloride (PF)  10 mL Intravenous Once      dexmedeTOMIDine Stopped (10/24/23 0733)    dextrose      dextrose Stopped (10/22/23 2106)    [Held by provider] heparin Stopped (10/24/23 0733)

## 2023-10-25 ENCOUNTER — APPOINTMENT (OUTPATIENT)
Dept: CT IMAGING | Facility: CLINIC | Age: 68
DRG: 268 | End: 2023-10-25
Payer: COMMERCIAL

## 2023-10-25 LAB
ALBUMIN SERPL BCG-MCNC: 3.1 G/DL (ref 3.5–5.2)
ALP SERPL-CCNC: 84 U/L (ref 40–129)
ALT SERPL W P-5'-P-CCNC: 23 U/L (ref 0–70)
AMMONIA PLAS-SCNC: 18 UMOL/L (ref 16–60)
ANION GAP SERPL CALCULATED.3IONS-SCNC: 12 MMOL/L (ref 7–15)
ANION GAP SERPL CALCULATED.3IONS-SCNC: 16 MMOL/L (ref 7–15)
APTT PPP: 33 SECONDS (ref 22–38)
AST SERPL W P-5'-P-CCNC: 37 U/L (ref 0–45)
ATRIAL RATE - MUSE: 119 BPM
BASE EXCESS BLDV CALC-SCNC: 2.5 MMOL/L (ref -7.7–1.9)
BASOPHILS # BLD AUTO: 0 10E3/UL (ref 0–0.2)
BASOPHILS NFR BLD AUTO: 0 %
BILIRUB DIRECT SERPL-MCNC: 0.25 MG/DL (ref 0–0.3)
BILIRUB SERPL-MCNC: 0.5 MG/DL
BLD PROD TYP BPU: NORMAL
BLD PROD TYP BPU: NORMAL
BLOOD COMPONENT TYPE: NORMAL
BLOOD COMPONENT TYPE: NORMAL
BUN SERPL-MCNC: 86.8 MG/DL (ref 8–23)
BUN SERPL-MCNC: 88.6 MG/DL (ref 8–23)
CA-I BLD-MCNC: 4.4 MG/DL (ref 4.4–5.2)
CALCIUM SERPL-MCNC: 8.7 MG/DL (ref 8.8–10.2)
CALCIUM SERPL-MCNC: 8.9 MG/DL (ref 8.8–10.2)
CF REDUC 30M P MA P HEP LENFR BLD TEG: 1.1 % (ref 0–8)
CF REDUC 60M P MA P HEPASE LENFR BLD TEG: 4.6 % (ref 0–15)
CFT P HPASE BLD TEG: 1 MINUTE (ref 1–3)
CHLORIDE SERPL-SCNC: 95 MMOL/L (ref 98–107)
CHLORIDE SERPL-SCNC: 96 MMOL/L (ref 98–107)
CI (COAGULATION INDEX)(Z): 2.7 (ref -3–3)
CLOT ANGLE P HPASE BLD TEG: 74.7 DEGREES (ref 53–72)
CLOT INIT P HPASE BLD TEG: 4.2 MINUTE (ref 5–10)
CLOT STRENGTH P HPASE BLD TEG: 9.2 KD/SC (ref 4.5–11)
CODING SYSTEM: NORMAL
CODING SYSTEM: NORMAL
CREAT SERPL-MCNC: 4.35 MG/DL (ref 0.67–1.17)
CREAT SERPL-MCNC: 4.59 MG/DL (ref 0.67–1.17)
CROSSMATCH: NORMAL
CROSSMATCH: NORMAL
CRYPTOC AG SPEC QL: NEGATIVE
DEPRECATED HCO3 PLAS-SCNC: 24 MMOL/L (ref 22–29)
DEPRECATED HCO3 PLAS-SCNC: 25 MMOL/L (ref 22–29)
DIASTOLIC BLOOD PRESSURE - MUSE: NORMAL MMHG
EGFRCR SERPLBLD CKD-EPI 2021: 13 ML/MIN/1.73M2
EGFRCR SERPLBLD CKD-EPI 2021: 14 ML/MIN/1.73M2
EOSINOPHIL # BLD AUTO: 2 10E3/UL (ref 0–0.7)
EOSINOPHIL NFR BLD AUTO: 13 %
ERYTHROCYTE [DISTWIDTH] IN BLOOD BY AUTOMATED COUNT: 17.9 % (ref 10–15)
FIBRINOGEN PPP-MCNC: 556 MG/DL (ref 170–490)
GLUCOSE BLDC GLUCOMTR-MCNC: 125 MG/DL (ref 70–99)
GLUCOSE BLDC GLUCOMTR-MCNC: 142 MG/DL (ref 70–99)
GLUCOSE BLDC GLUCOMTR-MCNC: 142 MG/DL (ref 70–99)
GLUCOSE BLDC GLUCOMTR-MCNC: 143 MG/DL (ref 70–99)
GLUCOSE BLDC GLUCOMTR-MCNC: 153 MG/DL (ref 70–99)
GLUCOSE BLDC GLUCOMTR-MCNC: 172 MG/DL (ref 70–99)
GLUCOSE SERPL-MCNC: 141 MG/DL (ref 70–99)
GLUCOSE SERPL-MCNC: 149 MG/DL (ref 70–99)
HCO3 BLDV-SCNC: 28 MMOL/L (ref 21–28)
HCT VFR BLD AUTO: 17.7 % (ref 40–53)
HGB BLD-MCNC: 5.8 G/DL (ref 13.3–17.7)
HGB BLD-MCNC: 8.6 G/DL (ref 13.3–17.7)
HGB BLD-MCNC: 8.9 G/DL (ref 13.3–17.7)
IMM GRANULOCYTES # BLD: 0.6 10E3/UL
IMM GRANULOCYTES NFR BLD: 4 %
INR PPP: 1.24 (ref 0.85–1.15)
INR PPP: 1.24 (ref 0.85–1.15)
INTERPRETATION ECG - MUSE: NORMAL
ISSUE DATE AND TIME: NORMAL
ISSUE DATE AND TIME: NORMAL
LACTATE SERPL-SCNC: 2.7 MMOL/L (ref 0.7–2)
LYMPHOCYTES # BLD AUTO: 0.6 10E3/UL (ref 0.8–5.3)
LYMPHOCYTES NFR BLD AUTO: 4 %
MAGNESIUM SERPL-MCNC: 2.3 MG/DL (ref 1.7–2.3)
MCF P HPASE BLD TEG: 64.7 MM (ref 50–70)
MCH RBC QN AUTO: 30.9 PG (ref 26.5–33)
MCHC RBC AUTO-ENTMCNC: 32.8 G/DL (ref 31.5–36.5)
MCV RBC AUTO: 94 FL (ref 78–100)
MONOCYTES # BLD AUTO: 1 10E3/UL (ref 0–1.3)
MONOCYTES NFR BLD AUTO: 6 %
NEUTROPHILS # BLD AUTO: 11.6 10E3/UL (ref 1.6–8.3)
NEUTROPHILS NFR BLD AUTO: 73 %
NRBC # BLD AUTO: 0 10E3/UL
NRBC BLD AUTO-RTO: 0 /100
O2/TOTAL GAS SETTING VFR VENT: 0 %
OXYHGB MFR BLDV: 36 % (ref 70–75)
P AXIS - MUSE: 42 DEGREES
PCO2 BLDV: 43 MM HG (ref 40–50)
PH BLDV: 7.41 [PH] (ref 7.32–7.43)
PHOSPHATE SERPL-MCNC: 5.6 MG/DL (ref 2.5–4.5)
PLATELET # BLD AUTO: 171 10E3/UL (ref 150–450)
PO2 BLDV: 23 MM HG (ref 25–47)
POTASSIUM SERPL-SCNC: 5.1 MMOL/L (ref 3.4–5.3)
POTASSIUM SERPL-SCNC: 5.1 MMOL/L (ref 3.4–5.3)
PR INTERVAL - MUSE: 134 MS
PROT SERPL-MCNC: 6.2 G/DL (ref 6.4–8.3)
QRS DURATION - MUSE: 72 MS
QT - MUSE: 326 MS
QTC - MUSE: 458 MS
R AXIS - MUSE: 30 DEGREES
RBC # BLD AUTO: 1.88 10E6/UL (ref 4.4–5.9)
SODIUM SERPL-SCNC: 133 MMOL/L (ref 135–145)
SODIUM SERPL-SCNC: 135 MMOL/L (ref 135–145)
SYSTOLIC BLOOD PRESSURE - MUSE: NORMAL MMHG
T AXIS - MUSE: 21 DEGREES
TSH SERPL DL<=0.005 MIU/L-ACNC: 16.7 UIU/ML (ref 0.3–4.2)
UNIT ABO/RH: NORMAL
UNIT ABO/RH: NORMAL
UNIT NUMBER: NORMAL
UNIT NUMBER: NORMAL
UNIT STATUS: NORMAL
UNIT STATUS: NORMAL
UNIT TYPE ISBT: 5100
UNIT TYPE ISBT: 5100
VENTRICULAR RATE- MUSE: 119 BPM
WBC # BLD AUTO: 15.7 10E3/UL (ref 4–11)

## 2023-10-25 PROCEDURE — 93005 ELECTROCARDIOGRAM TRACING: CPT

## 2023-10-25 PROCEDURE — 258N000003 HC RX IP 258 OP 636: Performed by: NURSE PRACTITIONER

## 2023-10-25 PROCEDURE — 99223 1ST HOSP IP/OBS HIGH 75: CPT | Mod: FS | Performed by: PHYSICIAN ASSISTANT

## 2023-10-25 PROCEDURE — 85018 HEMOGLOBIN: CPT | Performed by: NURSE PRACTITIONER

## 2023-10-25 PROCEDURE — 82140 ASSAY OF AMMONIA: CPT | Performed by: NURSE PRACTITIONER

## 2023-10-25 PROCEDURE — 36415 COLL VENOUS BLD VENIPUNCTURE: CPT | Performed by: NURSE PRACTITIONER

## 2023-10-25 PROCEDURE — 90935 HEMODIALYSIS ONE EVALUATION: CPT | Performed by: STUDENT IN AN ORGANIZED HEALTH CARE EDUCATION/TRAINING PROGRAM

## 2023-10-25 PROCEDURE — 85610 PROTHROMBIN TIME: CPT | Performed by: NURSE PRACTITIONER

## 2023-10-25 PROCEDURE — 70496 CT ANGIOGRAPHY HEAD: CPT | Mod: 26 | Performed by: RADIOLOGY

## 2023-10-25 PROCEDURE — 82805 BLOOD GASES W/O2 SATURATION: CPT | Performed by: STUDENT IN AN ORGANIZED HEALTH CARE EDUCATION/TRAINING PROGRAM

## 2023-10-25 PROCEDURE — 250N000011 HC RX IP 250 OP 636: Mod: JZ

## 2023-10-25 PROCEDURE — 36415 COLL VENOUS BLD VENIPUNCTURE: CPT | Performed by: STUDENT IN AN ORGANIZED HEALTH CARE EDUCATION/TRAINING PROGRAM

## 2023-10-25 PROCEDURE — 84443 ASSAY THYROID STIM HORMONE: CPT | Performed by: NURSE PRACTITIONER

## 2023-10-25 PROCEDURE — 82330 ASSAY OF CALCIUM: CPT | Performed by: PHYSICIAN ASSISTANT

## 2023-10-25 PROCEDURE — 87899 AGENT NOS ASSAY W/OPTIC: CPT | Performed by: PHYSICIAN ASSISTANT

## 2023-10-25 PROCEDURE — 85610 PROTHROMBIN TIME: CPT | Performed by: PHYSICIAN ASSISTANT

## 2023-10-25 PROCEDURE — C9113 INJ PANTOPRAZOLE SODIUM, VIA: HCPCS | Mod: JZ | Performed by: NURSE PRACTITIONER

## 2023-10-25 PROCEDURE — 999N000285 HC STATISTIC VASC ACCESS LAB DRAW WITH PIV START

## 2023-10-25 PROCEDURE — 99231 SBSQ HOSP IP/OBS SF/LOW 25: CPT | Mod: GC | Performed by: SURGERY

## 2023-10-25 PROCEDURE — 70498 CT ANGIOGRAPHY NECK: CPT

## 2023-10-25 PROCEDURE — 36415 COLL VENOUS BLD VENIPUNCTURE: CPT | Performed by: PHYSICIAN ASSISTANT

## 2023-10-25 PROCEDURE — 85025 COMPLETE CBC W/AUTO DIFF WBC: CPT

## 2023-10-25 PROCEDURE — 93010 ELECTROCARDIOGRAM REPORT: CPT | Performed by: INTERNAL MEDICINE

## 2023-10-25 PROCEDURE — 74174 CTA ABD&PLVS W/CONTRAST: CPT

## 2023-10-25 PROCEDURE — 99024 POSTOP FOLLOW-UP VISIT: CPT | Performed by: NURSE PRACTITIONER

## 2023-10-25 PROCEDURE — 87040 BLOOD CULTURE FOR BACTERIA: CPT

## 2023-10-25 PROCEDURE — 85396 CLOTTING ASSAY WHOLE BLOOD: CPT | Performed by: NURSE PRACTITIONER

## 2023-10-25 PROCEDURE — 70450 CT HEAD/BRAIN W/O DYE: CPT

## 2023-10-25 PROCEDURE — 250N000011 HC RX IP 250 OP 636: Mod: JZ | Performed by: NURSE PRACTITIONER

## 2023-10-25 PROCEDURE — 250N000013 HC RX MED GY IP 250 OP 250 PS 637

## 2023-10-25 PROCEDURE — 74174 CTA ABD&PLVS W/CONTRAST: CPT | Mod: 26 | Performed by: STUDENT IN AN ORGANIZED HEALTH CARE EDUCATION/TRAINING PROGRAM

## 2023-10-25 PROCEDURE — 85384 FIBRINOGEN ACTIVITY: CPT | Performed by: NURSE PRACTITIONER

## 2023-10-25 PROCEDURE — 85730 THROMBOPLASTIN TIME PARTIAL: CPT | Performed by: NURSE PRACTITIONER

## 2023-10-25 PROCEDURE — 84100 ASSAY OF PHOSPHORUS: CPT | Performed by: PHYSICIAN ASSISTANT

## 2023-10-25 PROCEDURE — 36415 COLL VENOUS BLD VENIPUNCTURE: CPT

## 2023-10-25 PROCEDURE — 250N000013 HC RX MED GY IP 250 OP 250 PS 637: Performed by: SURGERY

## 2023-10-25 PROCEDURE — 70498 CT ANGIOGRAPHY NECK: CPT | Mod: 26 | Performed by: RADIOLOGY

## 2023-10-25 PROCEDURE — 90937 HEMODIALYSIS REPEATED EVAL: CPT

## 2023-10-25 PROCEDURE — 250N000013 HC RX MED GY IP 250 OP 250 PS 637: Performed by: NURSE PRACTITIONER

## 2023-10-25 PROCEDURE — 80053 COMPREHEN METABOLIC PANEL: CPT

## 2023-10-25 PROCEDURE — 250N000011 HC RX IP 250 OP 636: Performed by: SURGERY

## 2023-10-25 PROCEDURE — 84425 ASSAY OF VITAMIN B-1: CPT | Performed by: NURSE PRACTITIONER

## 2023-10-25 PROCEDURE — 83735 ASSAY OF MAGNESIUM: CPT | Performed by: PHYSICIAN ASSISTANT

## 2023-10-25 PROCEDURE — 999N000175 HC STATISTIC STROKE CODE W/ ACCESS

## 2023-10-25 PROCEDURE — 83605 ASSAY OF LACTIC ACID: CPT | Performed by: PHYSICIAN ASSISTANT

## 2023-10-25 PROCEDURE — P9016 RBC LEUKOCYTES REDUCED: HCPCS

## 2023-10-25 PROCEDURE — 82374 ASSAY BLOOD CARBON DIOXIDE: CPT | Performed by: CLINICAL NURSE SPECIALIST

## 2023-10-25 PROCEDURE — 99233 SBSQ HOSP IP/OBS HIGH 50: CPT | Mod: 79 | Performed by: DERMATOLOGY

## 2023-10-25 PROCEDURE — 70496 CT ANGIOGRAPHY HEAD: CPT

## 2023-10-25 PROCEDURE — 200N000002 HC R&B ICU UMMC

## 2023-10-25 PROCEDURE — 258N000003 HC RX IP 258 OP 636: Performed by: CLINICAL NURSE SPECIALIST

## 2023-10-25 PROCEDURE — 71275 CT ANGIOGRAPHY CHEST: CPT | Mod: 26 | Performed by: STUDENT IN AN ORGANIZED HEALTH CARE EDUCATION/TRAINING PROGRAM

## 2023-10-25 RX ORDER — METRONIDAZOLE 500 MG/100ML
500 INJECTION, SOLUTION INTRAVENOUS EVERY 8 HOURS
Status: DISCONTINUED | OUTPATIENT
Start: 2023-10-25 | End: 2023-10-25

## 2023-10-25 RX ORDER — IOPAMIDOL 755 MG/ML
67 INJECTION, SOLUTION INTRAVASCULAR ONCE
Status: COMPLETED | OUTPATIENT
Start: 2023-10-25 | End: 2023-10-25

## 2023-10-25 RX ORDER — LEVOFLOXACIN 5 MG/ML
500 INJECTION, SOLUTION INTRAVENOUS
Status: DISCONTINUED | OUTPATIENT
Start: 2023-10-27 | End: 2023-10-25

## 2023-10-25 RX ORDER — CEFEPIME HYDROCHLORIDE 1 G/1
1 INJECTION, POWDER, FOR SOLUTION INTRAMUSCULAR; INTRAVENOUS EVERY 24 HOURS
Status: DISCONTINUED | OUTPATIENT
Start: 2023-10-25 | End: 2023-10-25

## 2023-10-25 RX ORDER — IOPAMIDOL 755 MG/ML
90 INJECTION, SOLUTION INTRAVASCULAR ONCE
Status: COMPLETED | OUTPATIENT
Start: 2023-10-25 | End: 2023-10-25

## 2023-10-25 RX ORDER — LEVOFLOXACIN 5 MG/ML
750 INJECTION, SOLUTION INTRAVENOUS ONCE
Qty: 1 EACH | Refills: 0 | Status: DISCONTINUED | OUTPATIENT
Start: 2023-10-25 | End: 2023-10-25

## 2023-10-25 RX ADMIN — ATORVASTATIN CALCIUM 10 MG: 10 TABLET, FILM COATED ORAL at 19:58

## 2023-10-25 RX ADMIN — INSULIN ASPART 1 UNITS: 100 INJECTION, SOLUTION INTRAVENOUS; SUBCUTANEOUS at 11:48

## 2023-10-25 RX ADMIN — Medication 40 MG: at 07:31

## 2023-10-25 RX ADMIN — ACETAMINOPHEN 975 MG: 325 TABLET, FILM COATED ORAL at 17:27

## 2023-10-25 RX ADMIN — SODIUM CHLORIDE 100 MG: 9 INJECTION, SOLUTION INTRAVENOUS at 12:57

## 2023-10-25 RX ADMIN — SODIUM CHLORIDE 300 ML: 9 INJECTION, SOLUTION INTRAVENOUS at 13:08

## 2023-10-25 RX ADMIN — Medication: at 13:08

## 2023-10-25 RX ADMIN — CEFEPIME HYDROCHLORIDE 1 G: 1 INJECTION, POWDER, FOR SOLUTION INTRAMUSCULAR; INTRAVENOUS at 07:30

## 2023-10-25 RX ADMIN — ACETAMINOPHEN 975 MG: 325 TABLET, FILM COATED ORAL at 05:30

## 2023-10-25 RX ADMIN — Medication 15 ML: at 07:31

## 2023-10-25 RX ADMIN — INSULIN ASPART 1 UNITS: 100 INJECTION, SOLUTION INTRAVENOUS; SUBCUTANEOUS at 03:44

## 2023-10-25 RX ADMIN — Medication 1 MG: at 17:27

## 2023-10-25 RX ADMIN — IOPAMIDOL 90 ML: 755 INJECTION, SOLUTION INTRAVENOUS at 09:37

## 2023-10-25 RX ADMIN — SODIUM CHLORIDE 250 ML: 9 INJECTION, SOLUTION INTRAVENOUS at 13:07

## 2023-10-25 RX ADMIN — PANTOPRAZOLE SODIUM 40 MG: 40 INJECTION, POWDER, FOR SOLUTION INTRAVENOUS at 19:58

## 2023-10-25 RX ADMIN — QUETIAPINE FUMARATE 100 MG: 100 TABLET ORAL at 22:09

## 2023-10-25 RX ADMIN — ACETAMINOPHEN 975 MG: 325 TABLET, FILM COATED ORAL at 11:47

## 2023-10-25 RX ADMIN — HYDROCORTISONE: 25 CREAM TOPICAL at 19:58

## 2023-10-25 RX ADMIN — METRONIDAZOLE 500 MG: 500 INJECTION, SOLUTION INTRAVENOUS at 06:17

## 2023-10-25 RX ADMIN — HYDROCORTISONE: 25 CREAM TOPICAL at 07:31

## 2023-10-25 RX ADMIN — ACETAMINOPHEN 975 MG: 325 TABLET, FILM COATED ORAL at 22:09

## 2023-10-25 RX ADMIN — INSULIN ASPART 2 UNITS: 100 INJECTION, SOLUTION INTRAVENOUS; SUBCUTANEOUS at 07:35

## 2023-10-25 RX ADMIN — INSULIN ASPART 1 UNITS: 100 INJECTION, SOLUTION INTRAVENOUS; SUBCUTANEOUS at 15:07

## 2023-10-25 RX ADMIN — IOPAMIDOL 67 ML: 755 INJECTION, SOLUTION INTRAVENOUS at 05:05

## 2023-10-25 ASSESSMENT — ACTIVITIES OF DAILY LIVING (ADL)
ADLS_ACUITY_SCORE: 55
ADLS_ACUITY_SCORE: 51
ADLS_ACUITY_SCORE: 55

## 2023-10-25 NOTE — PROGRESS NOTES
CLINICAL NUTRITION SERVICES - REASSESSMENT NOTE     Nutrition Prescription    Malnutrition Status:     Severe malnutrition in the context of acute illness    Recommendations already ordered by Registered Dietitian (RD):  --Modifying to renal formula: Novasource Renal (or equivalent) @ 45 ml/hr (1080 ml) +1 Prosource TF20 packet provides 2240 kcal (31 kcal/kg), 118 g pro (1.6 g/kg), 198 g CHO, 774 ml free water, and 0 g fiber daily.      Future/Additional Recommendations:  --Lytes, renal function.  --Ongoing TF tolerance.  --Diet adv.   --Weight trends.      EVALUATION OF THE PROGRESS TOWARD GOALS   Diet: NPO  Per SLP 10/23: Recommend continue NPO status with alteranate means of nutrition/hydration/medication administration. Ice chips allowed for pleasure only following oral cares - patient requires feeding assistance.   VFSS 10/23:  1. Laryngeal penetration and silent tracheal aspiration of thin liquid consistency (both the initial bolus and remaining residue).  2. Laryngeal penetration without tracheal aspiration of mildly thick liquid consistency.     Nutrition Support: (most recent regimens)  10/12-10/19: TPN   Volume:  Max concentrated   Dextrose: 180 g   AA: 140 g   Lipids: 250 ml 20% clinolipid daily   10/13: 210 g dex   10/14: 240 g dex (goal)  Goal PN provides 240 g dextrose, 120 g AA, and lipid kcals from propofol vs daily 250ml 20% clinolipid for total provision of  1796 Kcals (25 Kcals/kg), 1.7 g/kg protein, GIR 2.3 mg/kg/minute, and 28% fat kcals on average daily.      10/18-10/19: Osmolite 1.5 @ 20 ml/hr  10/19 - ___: Osmolite 1.5 Jvuencio (or equivalent) @ goal of  50ml/hr  (1200ml/day) + 1 pkt ProSource TF20 TID (3 pkts total) provides: 2040 kcals (28 kcal/kg), 135 g PRO (1.9 g pro/kg), 914 ml free H20, 244 g CHO, and 0 g fiber daily.   Intake: pt received on average ~80% of low-end nutrition needs via TF over the past 7 days due to frequently self-removing feeding tube and needing replacement.      NEW  FINDINGS   Weight: variable but overall down since admission (6.7% wt loss x 1 month based on weight from 9/25). Cannot rule out fluid shifts.   Labs: K+ 5.1 (WNL), BUN 86.8 (H), Cr 4.35 (H), Phos 5.6 (H)  Meds: High sliding scale insulin, Certavite, Prosource TF20 TID  GI: +loose stools, mostly 150-500 ml/day over the past week (250 ml output on 10/24); abdomen soft, rounded. CT abdomen 10/24 with new intramuscular hematoma of psoas muscle. Repeat CT pending today.   Renal: iHD (last run 10/23)  Resp: extubated 10/19  Skin: WOCN signed off 10/19; multiple surgical incisions     MALNUTRITION  % Intake: Decreased intake does not meet criteria  % Weight Loss: > 5% in 1 month (severe) - cannot rule out fluid shifts  Subcutaneous Fat Loss: Facial region:  moderate, Upper arm:  moderate, Lower arm:  mild, and Thoracic/intercostal:  mild  Muscle Loss: Temporal:  moderate, Facial & jaw region:  mild, Upper arm (bicep, tricep):  moderate to severe, Lower arm  (forearm):  moderate, Dorsal hand:  mild, and Upper leg (quadricep, hamstring):  mild  Fluid Accumulation/Edema: Mild  Malnutrition Diagnosis: Severe malnutrition in the context of acute illness    Previous Goals   Total avg nutritional intake to meet a minimum of 25 kcal/kg and 1.5 g PRO/kg daily (per dosing wt 72 kg).   Evaluation: Not met    Previous Nutrition Diagnosis  Inadequate oral intake related to NPO as evidenced by need for TPN and TF to meet 100% of nutrition needs.    Evaluation: No change    CURRENT NUTRITION DIAGNOSIS  Inadequate oral intake related to dysphagia as evidenced by NPO status.       INTERVENTIONS  Implementation  Enteral Nutrition - as above    Goals  Total avg nutritional intake to meet a minimum of 25 kcal/kg and 1.5 g PRO/kg daily (per dosing wt 72 kg).    Monitoring/Evaluation  Progress toward goals will be monitored and evaluated per protocol.    Andie Sy, MS, RD, LD, [facility]  MICU pager: [phone]  ASCOM: [id number]  Weekend/Holiday  pager: [phone]

## 2023-10-25 NOTE — PROGRESS NOTES
[facility] Inpatient Dermatology Progress Note    Date of Admission: Sep 24, 2023   Encounter Date: 10/25/2023    Assessment and Plan:    1. Morbilliform (Exanthematous) Drug Eruption, possibly 2/2 cefepime    Patient's rash today appears slightly more prominent than last derm exam, at which time he was clearing up. Unclear if this is due to restarting cefepime today or potentially a component of contact dermatitis. Continues to have stable but elevated eosinophils; LFTs remain normal.     Clinical impression of a diffuse erythematous eruption (onset ~10/13) is rather nonspecific but most frequently represents a viral exanthem or a morbilliform drug eruption. Dermpath review of left leg amputation with rash present 10/17/23 showed spongiotic dermatitis. Morbilliform or exanthematous drug eruptions usually manifest between 4 and 14 days after initiating a medication. The time to eruption may be shorter if the patient had previously been sensitized to the triggering medication (patient potentially with reaction to penicillins in the past). The most common culprits include antibiotics (penicillins and sulfas), allopurinol, anti-epileptics (phenytoin, barbiturates, carbamazepine), and NSAIDs, but many other drug culprits have been reported. In this patient, he has been on a few recent antibiotics including vancomycin (10/1-2), metronidazole (9/25-10/3, 10/25-), and cefepime (9/25-10/3, 10/14-15, restarted 10/25-). Cefepime would be the most suspicious culprit at this time.     For exanthematous drug eruptions, prompt withdrawal of the offending drug is the mainstay of treatment. It may take an additional 1-2 weeks after stopping the medication before the eruption completely resolves. Drug-induced hypersensitivity syndrome (DIHS; previously DRESS or Drug Reaction with Eosinophilia and Systemic Symptoms) is less favored at this time given quick improvement of his rash and resolved transaminitis,  "although it would be important to continue trending his eosinophils.      Recommendations:  - recommend switching cefepime to a different antibiotic if possible  - okay to stop scheduled triamcinolone 0.1% ointment BID per wife's preference; can switch to PRN  - continue daily CBC with diff & hepatic panel (trending eosinophils and LFTs)    Thank you for the dermatology consult. Please do not hesitate to contact the dermatology resident/faculty on call for any additional questions or concerns.     Staffed with attending physician, Dr. Hakeem Austin MD   Dermatology Resident (PGY-4)    I have seen and examined this patient and agree with the assessment and plan as documented in the resident's note.    Jone Palacio MD  Dermatology Attending      Dermatology Problem List  1. Morbilliform (Exanthematous) Drug Eruption, possibly 2/2 cefepime  - Dermpath review of left leg amputation with rash present 10/17/23 showed spongiotic dermatitis.  - Onset ~10/13  - Elevated eos since 10/14/23; recently stable ~2  - Recent antibiotics: vancomycin (10/1-2), metronidazole (9/25-10/3, 10/25-), cefepime (9/25-10/3, 10/14-15, 10/25-)    Interval history:  - Unfortunately patient had a fever and stroke code this morning. Was restarted on cefepime and metronidazole this AM.  - Eosinophils elevated but stable the past few days. LFTs remain normal  - Wife at bedside feels like the rash might be slightly more prominent than last exam, at which time it was very faint. She wonders if it's due to his skin being sensitive to something that is contacting him    Medications:  Reviewed in epic, pertinent findings summarized as above    Physical exam:  BP (!) 159/89   Pulse 115   Temp 98.7  F (37.1  C)   Resp 11   Ht 1.727 m (5' 8\")   Wt 74.1 kg (163 lb 5.8 oz)   SpO2 99%   BMI 24.84 kg/m    SKIN: Focused examination of the head, neck, arms, legs, upper chest, and lower abdomen was performed.  - Driscoll skin type: I  - There " are pink thin papules coalescing into thin plaques involving the trunk, arms, and thighs.   - No other lesions of concern on areas examined.                       Laboratory:  Reviewed in epic, pertinent findings summarized as above    Staff Involved:  Resident/Staff

## 2023-10-25 NOTE — CONSULTS
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 Stroke code called on 10/25 @ 0430 for decreased responsiveness. LKW ~0200.    Initial exam notable for somnolence, sparse, mildly slurred speech, disorientation and difficulty following basic commands but no focal or lateralizing deficits (NIHSS 16). Per bedside RN, at baseline earlier today, patient is oriented only to self consistently but can hold a conversation and engage meaningfully with others. Vital signs notable for T = 102, , & /96. During course of code, patient's wakefulness fluctuated appearing at times more alert and interactive while also more readily following commands.    Imaging Findings  CTH = negative for ICH or subacute CVA  CTA = negative for LVO    Intravenous Thrombolysis  Not given due to:   - head trauma/stroke within the past 3 months  - stroke mimic: Sepsis vs Toxic/Metabolic Encephalopathy  - surgery/trauma within the past 14 days  - unclear or unfavorable risk-benefit profile for extended window thrombolysis beyond the conventional 4.5 hour time window    Endovascular Treatment  Not initiated due to absence of proximal vessel occlusion    Impression   #Altered Mental Status  #Concern for Sepsis  Alfredo Burnham is a 68 year old male with very complicated PMHx most notable for ruptured AAA s/p repair (9/24/23) c/b PE/DVT, multifocal infarcts 2/2 ESUS (10/6),  LLE ischemia s/p AKA (10/17), & recent Left psoas muscle hematoma s/p transfusion x2 units PRBC for which his Heparin gtt was also stopped (10/24). Stroke code called on 10/25 @ 0430 for decreased responsiveness. LKW ~0200. Presentation consistent with Toxic/Infectious/Metabolic Encephalopathy given non-focal & non-lateralizing exam as well as CTH/CTA negative for gross bleed or occlusion. He had a new fever of 102 which in setting of recent surgeries raises suspicion for infection. However, new small infarcts must be considered in this patient with recent DVT/PE and ESUS in setting of recently held anti-coagulation. Seizures is also a potential etiology for his AMS given prior strokes but should be considered after ruling out the above.    Recommendations  -MRI Brain wwo contrast  -Treat underlying infection, correct metabolic derangements as able  -Avoid CNS acting drugs (including opiates, benzodiazepines, anti-cholinergics); consider local measures or scheduled pain regimens that minimize opioids for pain  -Supportive medical cares, including correction of any electrolyte abnormalities  -Consider TSH, ammonia, thiamine, ABG  -Delirium precautions:   - frequent reorientation   - normal day night cycles (blinds up & lights on during day; lights & screens off at evening/night)   - minimize frequent interruptions, clutter and loud noises   - encourage family visitations   - consider melatonin at 1900 nightly   - avoid sensory deprivation (provide patient with eyeglasses or hearing aids if using)  - If MRI negative and/or fluctuating symptoms that have not cleared despite above workup/interventions, may consider EEG.      Patient Follow-up     - final recommendation pending work-up    Thank you for this consult. Neurology will continue to follow. Stroke team signed out to NCC team.     This patient was discussed with the stroke fellow Dr. Castillo. The stroke attending faculty is Dr. Linares.    Joseph Baron,  DO  Neurology Resident PGY3  10/25/2023 5:42 AM  Stroke ASCOM: *[id number]  Stroke Pager: [phone]    ______________________________________________________    Clinically Significant Risk Factors          # Hypocalcemia: Lowest iCa = 4.3 mg/dL in last 2 days, will monitor and replace as appropriate     # Hypoalbuminemia: Lowest albumin = 1.5 g/dL at 9/28/2023  7:53 AM, will monitor as appropriate     # Hypertension: Noted on problem list         # Severe Malnutrition: based on nutrition assessment             Past Medical History   Past Medical History:   Diagnosis Date    Hypertension 2/8/2011    Macular degeneration      Past Surgical History   Past Surgical History:   Procedure Laterality Date    AMPUTATE LEG ABOVE KNEE Left 10/17/2023    Procedure: AMPUTATION, ABOVE KNEE and Abdominal wound vac exchange;  Surgeon: Ash Boucher MBBS;  Location: [facility] OR    CLOSE SECONDARY WOUND ABDOMEN N/A 10/20/2023    Procedure: Delayed primary subcutaneous abdominal closure;  Surgeon: Boris Lowe;  Location: [facility]    INCISION AND DRAINAGE ABDOMEN WASHOUT, COMBINED N/A 9/25/2023    Procedure: abdominal washout, combined, repacking,  abthera placement;  Surgeon: Ash Boucher MBBS;  Location: [facility]    INCISION AND DRAINAGE ABDOMEN WASHOUT, COMBINED N/A 9/26/2023    Procedure: Abdominal washout, Retroperitoneal closure, washout left lower extremity wound;  Surgeon: Boris Lowe;  Location: [facility] OR    IR OR ANGIOGRAM  9/25/2023    IRRIGATION AND DEBRIDEMENT ABDOMEN WASHOUT, COMBINED N/A 9/29/2023    Procedure: exploration of  ABDOMINAL CAVITY, placement of abthera;  Surgeon: Boris Lowe;  Location: [facility]    IRRIGATION AND DEBRIDEMENT ABDOMEN WASHOUT, COMBINED N/A 10/2/2023    Procedure: Exploratory laparotomy, abominal closure, wound vac change left lower extremity;  Surgeon: Jonathan Fry MD;  Location: [facility]    IRRIGATION AND DEBRIDEMENT LOWER EXTREMITY, COMBINED Left 9/29/2023    Procedure:  exploration of left lower extremity, partial closure of left lower extremity, wound vac placement;  Surgeon: Boris Lowe;  Location: [facility]    LAPAROTOMY EXPLORATORY N/A 9/25/2023    Procedure: Laparotomy exploratory;  Surgeon: Roger Shirley MD;  Location: [facility]    REPAIR ANEURYSM ABDOMINAL AORTA N/A 9/24/2023    Procedure: Resection of Ruptureed Abdominal Aneurysm, Aortic biliary bypass with 20 x 10 mm Bifurcated hemagard graft, Temporary Abdominal Closure, Endovascular Balloon Inclusion of Aorta;  Surgeon: Boris Lowe;  Location: [facility]    SIGMOIDOSCOPY FLEXIBLE N/A 9/25/2023    Procedure: Sigmoidoscopy flexible;  Surgeon: Gabino Walker MD;  Location: [facility]    THROMBECTOMY LOWER EXTREMITY Left 9/25/2023    Procedure: SFA, popliteal, and tibial thromboembolectomy and four compartment fasciotomy;  Surgeon: Ash Boucher MBBS;  Location: [facility]     Medications   Home Meds  Prior to Admission medications    Medication Sig Start Date End Date Taking? Authorizing Provider   amLODIPine-benazepril (LOTREL) 5-20 MG capsule Take 1 capsule by mouth daily   Yes Reported, Patient       Scheduled Meds   acetaminophen  975 mg Oral or Feeding Tube Q6H    atorvastatin  10 mg Oral or Feeding Tube QPM    ceFEPIme  1 g Intravenous Q24H    hydrocortisone   Topical BID    insulin aspart  1-12 Units Subcutaneous Q4H    melatonin  1 mg Oral or Feeding Tube QPM    metroNIDAZOLE  500 mg Intravenous Q8H    multivitamins w/minerals  15 mL Per Feeding Tube Daily    pantoprazole  40 mg Oral or Feeding Tube QAM AC    protein modular  1 packet Per Feeding Tube TID    QUEtiapine  100 mg Oral or Feeding Tube At Bedtime    sodium chloride (PF)  10 mL Intravenous Once       Infusion Meds   dexmedeTOMIDine Stopped (10/24/23 0733)    dextrose      dextrose Stopped (10/22/23 2106)    [Held by provider] heparin Stopped (10/24/23 0733)       PRN Meds  acetaminophen, benzocaine 20%, bisacodyl, cetirizine, dexmedeTOMIDine,  dextrose, dextrose, glucose **OR** dextrose **OR** glucagon, diphenhydrAMINE-zinc acetate, hydrALAZINE, HYDROmorphone, HOLD MEDICATION, labetalol, lidocaine, lidocaine, midodrine, naloxone **OR** naloxone **OR** naloxone **OR** naloxone, ondansetron **OR** ondansetron, oxyCODONE, prochlorperazine **OR** prochlorperazine, QUEtiapine, sodium chloride (PF), triamcinolone    Allergies   Allergies   Allergen Reactions    Penicillins Swelling     Facial swelling    Has tolerated cefazolin, cefepime     Family History   Family History   Problem Relation Age of Onset    Unknown/Adopted Mother     Heart Disease Mother     Arthritis Mother     Hypertension Brother     Blood Disease Sister     Psychotic Disorder Sister      Social History   Social History     Tobacco Use    Smoking status: Every Day     Packs/day: .5     Types: Cigarettes   Substance Use Topics    Alcohol use: Yes     Comment: 1-2/wk    Drug use: No         PHYSICAL EXAMINATION  Temp:  [97.5  F (36.4  C)-102.1  F (38.9  C)] 102.1  F (38.9  C)  Pulse:  [] 122  Resp:  [0-24] 17  BP: ()/() 144/88  SpO2:  [92 %-100 %] 100 %     Neurologic  Mental Status:  Awakens to repeated stimuli, makes eye contact, sparse speech. Oriented to self only. Follows command with repeated prompting and coaching; obeys verbal commands intermittently.  Cranial Nerves:  visual fields grossly intact blink to threat, PERRL, EOMI with roving conjugate gaze in all directions, facial movements symmetric, hearing not formally tested but intact to conversation, mild dysarthria.  Motor:  normal muscle tone and bulk, able to move all limbs spontaneously, BUE drift to bed & LLE moves in plane of bed  Sensory:  grimaces but does not withdraw from noxious stimuli x4 extremities  Coordination:  WENDI due to mental status  Station/Gait:  deferred    Dysphagia Screen  Per Nursing    Stroke Scales    NIHSS 16        Modified [place] Score (Pre-morbid)  4-Moderately severe disability;  "unable to walk without assistance and unable to attend to own bodily    Imaging  I personally reviewed all imaging; relevant findings per HPI.     Lab Results Data   CBC  Recent Labs   Lab 10/25/23  0439 10/24/23  1849 10/24/23  1305 10/24/23  0756 10/24/23  0651   WBC 15.7*  --   --  12.3* 12.4*   RBC 1.88*  --   --  1.92* 1.91*   HGB 5.8* 8.1* 8.1* 5.9* 5.8*   HCT 17.7*  --   --  18.2* 18.1*     --   --  144* 142*     Basic Metabolic Panel    Recent Labs   Lab 10/25/23  0439 10/25/23  0343 10/24/23  2357 10/24/23  1210 10/24/23  0910 10/24/23  0651 10/23/23  1143 10/23/23  0555     --   --   --   --  134*  --  135   POTASSIUM 5.1  --   --   --   --  4.8  --  4.4   CHLORIDE 95*  --   --   --   --  97*  --  90*   CO2 24  --   --   --   --  25  --  27   BUN 86.8*  --   --   --   --  55.9*  --  120.0*   CR 4.35*  --   --   --  3.27* 3.13*  --  5.55*   * 142* 153*   < >  --  124*   < > 153*   OMAR 8.9  --   --   --   --  8.4*  --  8.7*    < > = values in this interval not displayed.     Liver Panel  Recent Labs   Lab 10/25/23  0439 10/24/23  0651 10/23/23  0555   PROTTOTAL 6.2* 5.5* 5.6*   ALBUMIN 3.1* 3.0* 3.0*   BILITOTAL 0.5 0.5 0.5   ALKPHOS 84 81 99   AST 37 28 25   ALT 23 23 25     INR    Recent Labs   Lab Test 10/16/23  0431 10/15/23  0353 10/13/23  2130   INR 1.32* 1.32* 1.20*      Lipid Profile    Recent Labs   Lab Test 10/12/23  0323 10/07/23  0428 09/29/23  0351   CHOL  --  87  --    HDL  --  23*  --    LDL  --  32  --    TRIG 125 160* 129     A1C    Recent Labs   Lab Test 09/25/23  0344   A1C 5.7*     Troponin  No results for input(s): \"CTROPT\", \"TROPONINIS\", \"TROPONINI\", \"GHTROP\" in the last 168 hours.     Stroke Code Data Data   Stroke Code Data  (for stroke code without tele)  Stroke code activated 10/25/23   0430   First stroke provider response         Last known normal 10/25/23   0200   Time of discovery   (or onset of symptoms) 10/25/23   0415   Head CT read by Stroke Neuro " Dr/Provider 10/25/23   0510   Was stroke code de-escalated? Yes 10/25/23 0514

## 2023-10-25 NOTE — PROGRESS NOTES
HEMODIALYSIS TREATMENT NOTE    Date: 10/25/2023  Time: 2:24 PM    Data:    Weight change: 2 kg  Ultrafiltration - Post Run Net Total Removed (mL): 2000 mL  Vascular Access Status: patent  Dialyzer Rinse: Streaked, Light  Total Blood Volume Processed:  79.0  Liters  Total Dialysis (Treatment) Time: 3.5 Hours  K 2, CA 2.5  No heparin  Lab:   Yes    Interventions:  Right CVC dressing changed, site care done, no s/sx of infection noted, 400 blood flow rate achieved and maintained, crit line used for fluid volume monitoring, profile A/B mostly, arrhythmias noted on Tele with tx initiation, UF off, Bebeto CNP paged, ECG obtained, pt was assessed and OK to cont with HD, goal adjusted per crit line and hemodynamics, net fluid removed 2000 ml, rinsed back successfully, lines flushed and locked with saline, caps applied, see HD flow sheets for details, report given to primary RN post HD.    Assessment:  Awake,alert to self, calm and cooperative, confused at times, received with restraints, right CVC intact, right CVC intact, dressing noted with old drainage, tachy HR in the 110's- low 120's.       Plan:    Per renal team.

## 2023-10-25 NOTE — PLAN OF CARE
ICU End of Shift Summary. See flowsheets for vital signs and detailed assessment.    Changes this shift: confused.  2 units PRBCs given for hemoglobin drop. Recheck came back up to 8.6.  Next recheck at 1800.  CTA chest/abdomen/pelvis completed, shows new hematoma in stump.  Per team, continue serial hemoglobins. HD today, 2L off.  Patient had 1 urine occurrence. Frequent ectopy, sometimes trigeminy.     Plan: continue plan of care       Goal Outcome Evaluation: not progressing       Plan of Care Reviewed With: patient, spouse    Overall Patient Progress: no changeOverall Patient Progress: no change

## 2023-10-25 NOTE — CONSULTS
"    Interventional Radiology  [facility] Consult Service Note  10/25/23   7:38 AM    Consult Requested: \"concern for psoas hematoma ? If needs embolization. Please call [phone] to discuss.\"    Recommendations/Plan:    IR recommends obtaining a CTA for evaluation of active extravasation. We are unable to embolize if there is no active extravasation. Please contact IR after CTA evaluation.     Case and imaging discussed with IR attending Dr. Sadiq Ledezma MD  Recommendations were reviewed with provider at [phone].   History of Present Illness:  Alfredo Burnham is a 68 year old male with complicated PMHx of HTN, TIA, b/l legally blind who is p/w ruptured AAA s/p repair (9/24/23) c/b bowel ischemia s/p multiple washouts, MAYA requiring HD, PE/DVT, multifocal infarcts 2/2 ESUS (10/6), LLE ischemia s/p AKA (10/17), abdominal incision closure (10/20), & recent Left psoas muscle hematoma s/p transfusion x2 units PRBC for which his Heparin gtt was also stopped (10/24). Stroke code called this AM, CT was negative for stroke. CTA CAP w/ contrast yesterday without active extravasation from the left psoas muscle. Patient's hemoglobin decreased today, and request was for embolization. However, we do not have a new CTA that demonstrates any extravasation.     Pertinent Imaging Reviewed:   CT chest abdomen pelvix w/ contrast 10/24/23     Expected date of discharge:  TBD    Vitals:   BP (!) 141/75   Pulse 118   Temp 98.7  F (37.1  C) (Oral)   Resp 12   Ht 1.727 m (5' 8\")   Wt 74.1 kg (163 lb 5.8 oz)   SpO2 98%   BMI 24.84 kg/m      Pertinent Labs:   Lab Results   Component Value Date    WBC 15.7 (H) 10/25/2023    WBC 12.3 (H) 10/24/2023    WBC 12.4 (H) 10/24/2023    WBC 7.0 02/18/2009     Lab Results   Component Value Date    HGB 5.8 10/25/2023    HGB 8.1 10/24/2023    HGB 8.1 10/24/2023    HGB 15.1 02/18/2009     Lab Results   Component Value Date     10/25/2023     10/24/2023     " 10/24/2023     02/18/2009     Lab Results   Component Value Date    INR 1.32 (H) 10/16/2023    INR 0.9 10/15/2009    PTT 50 (H) 10/16/2023    PTT 30 02/18/2009     Lab Results   Component Value Date    POTASSIUM 5.1 10/25/2023    POTASSIUM 3.7 10/13/2023    POTASSIUM 2.5 (LL) 09/25/2023    POTASSIUM 3.9 02/08/2011        COVID-19 Antibody Results, Testing for Immunity           No data to display              COVID-19 PCR Results           No data to display                Debbie Zuñiga PA-C  Interventional Radiology  Pager: [phone]

## 2023-10-25 NOTE — PROGRESS NOTES
Arrival to Stroke Code: 0431    Stroke Team escalate/de-escalate interventions: CT/CTA. De-escalated by Joseph Baron MD at 0513.    ED/Bedside Nurse providing handoff: Inez Stearns RN at 0431    Time left for CT: 0451    Time Returned to ED/Unit: 0510    ED/Bedside Nurse given handoff to & Time: Inez Stearns RN at 0513

## 2023-10-25 NOTE — PROGRESS NOTES
Nephrology Progress Note  10/25/2023       Alfredo Burnham is a 68 yom with complex hx of TIA, HTN, blindness who presented to [facility] 9/24 with severe abdominal pain radiating to flank and back, CT revealed AAA with a contained rupture.  Taken to OR for aortobiiliac bypass and resection of ruptured pararenal aneurysm.   Post op course complicated by hypotension requiring pressors and metabolic acidosis.  Baseline Cr 1.0 on presentation but on the rise to >5 at time of renal consult for MAYA management and possible RRT.       Interval History :   Mr Burnham was seen on HD this am, pulling 2-3L to account for PRBC's needed in past 24h with concern for bleeding.  Has had multiple contrast loads the past 48h for stroke codes and to evaluate bleeding, also has known renal infarcts.      Assessment & Recommendations:   MAYA-Baseline Cr 1.0, ordered UA.  CT showed benign cyst but otherwise normal kidneys.  Cr on the rise since surgery, did receive contrast for CTA.  Urine microscopy showed granular casts suggesting ATN which will recover with time and stabilizing hemodynamics.  Started on CRRT 9/30 for volume and clearance with minimal UOP and rising Cr.  Transitioned to iHD on 10/5.      -HD today, 3.5h/2-3L      -Appreciate IR placing tunneled line RIJ 10/18.                  -Dialysis consent signed and scanned into media tab.      -Infarcts seen on CT 10/13 lowers chances of recovery.            Volume-Receiving multiple PRBC's, more SOB subjectively and had B line on POCUS.  Pulling 2-3L as BP's are stable but will watch for hypotension as we are concerned he is bleeding.     Electrolytes-K 5.1, bicarb 25, Na 133.       BMD-Ca 8.7, Mg 2.1.  Phos 3.7, no issues.        Anemia-Hgb 5.8 again despite 2u PRBC's yesterday which we are repeating, evaluating for bleeding.   ~14 on presentation, acute management per team.       Nutrition-On Osmolite TF.       Time spent: 40 minutes on this date of encounter for chart  "review, physical exam, medical decision making and co-ordination of care.       Recommendations were communicated to primary team via verbal communication.        ALTAGRACIA Jiménez CNS  Clinical Nurse Specialist  [phone]    Review of Systems:   I reviewed the following systems:  ROS not done due to lethargy    Physical Exam:   I/O last 3 completed shifts:  In: 1916.27 [I.V.:56.27; NG/GT:510]  Out: 1200 [Urine:100; Emesis/NG output:600; Drains:200; Stool:300]   /79   Pulse 117   Temp 97.5  F (36.4  C) (Oral)   Resp 21   Ht 1.727 m (5' 8\")   Wt 74.1 kg (163 lb 5.8 oz)   SpO2 98%   BMI 24.84 kg/m       GENERAL APPEARANCE: Extubated, lethargic, in no distress.   EYES: No scleral icterus  Pulmonary: On vent 40%/8 of PEEP  CV: Regular rhythm, normal rate   - Edema +1-2 generalized, + scrotal edema.    GI: distended, nontender  MS: no evidence of inflammation in joints, no muscle tenderness  : No Lu  SKIN: no rash, warm, dry  NEURO: No focal deficits.         Labs:   All labs reviewed by me  Electrolytes/Renal -   Recent Labs   Lab Test 10/25/23  1144 10/25/23  1112 10/25/23  0734 10/25/23  0439 10/24/23  1210 10/24/23  0910 10/24/23  0651 10/23/23  1143 10/23/23  0555   NA  --  133*  --  135  --   --  134*  --  135   POTASSIUM  --  5.1  --  5.1  --   --  4.8  --  4.4   CHLORIDE  --  96*  --  95*  --   --  97*  --  90*   CO2  --  25  --  24  --   --  25  --  27   BUN  --  88.6*  --  86.8*  --   --  55.9*  --  120.0*   CR  --  4.59*  --  4.35*  --  3.27* 3.13*  --  5.55*   * 149* 172* 141*   < >  --  124*   < > 153*   OMAR  --  8.7*  --  8.9  --   --  8.4*  --  8.7*   MAG  --   --   --  2.3  --   --  2.1  --  2.9*   PHOS  --   --   --  5.6*  --   --  3.7  --  4.2    < > = values in this interval not displayed.       CBC -   Recent Labs   Lab Test 10/25/23  1112 10/25/23  0439 10/24/23  1849 10/24/23  1305 10/24/23  0756 10/24/23  0651   WBC  --  15.7*  --   --  12.3* 12.4*   HGB 8.6* 5.8* 8.1* "   < > 5.9* 5.8*   PLT  --  171  --   --  144* 142*    < > = values in this interval not displayed.       LFTs -   Recent Labs   Lab Test 10/25/23  0439 10/24/23  0651 10/23/23  0555   ALKPHOS 84 81 99   BILITOTAL 0.5 0.5 0.5   ALT 23 23 25   AST 37 28 25   PROTTOTAL 6.2* 5.5* 5.6*   ALBUMIN 3.1* 3.0* 3.0*       Iron Panel - No lab results found.        Current Medications:   acetaminophen  975 mg Oral or Feeding Tube Q6H    atorvastatin  10 mg Oral or Feeding Tube QPM    hydrocortisone   Topical BID    insulin aspart  1-12 Units Subcutaneous Q4H    melatonin  1 mg Oral or Feeding Tube QPM    multivitamins w/minerals  15 mL Per Feeding Tube Daily    pantoprazole  40 mg Intravenous BID    [START ON 10/26/2023] protein modular  1 packet Per Feeding Tube Daily    QUEtiapine  100 mg Oral or Feeding Tube At Bedtime    sodium chloride (PF)  10 mL Intravenous Once      dextrose      dextrose Stopped (10/22/23 2106)    [Held by provider] heparin Stopped (10/24/23 0733)

## 2023-10-25 NOTE — PROGRESS NOTES
Vascular Surgery Progress Note  10/25/2023       Subjective:  Lethargic, slow to respond overnight fever 102.1.  Stroke code called, CCTH negative for ICH or subacute CVA, CTA negative for LVO. Hemoglobin down to 5.8 this morning, transfused 2 units of blood.  Prevena VAC drained 500 mL over 48 hours, 0 mL since midnight.  Heparin drip has been on hold.  FARIDA drain 5 mL in 24 hours.  0 mL since midnight    Objective:  Temp:  [97.5  F (36.4  C)-102.1  F (38.9  C)] 98.2  F (36.8  C)  Pulse:  [] 119  Resp:  [0-24] 18  BP: ()/() 135/75  SpO2:  [92 %-100 %] 96 %    I/O last 3 completed shifts:  In: 1863.78 [I.V.:293.78; NG/GT:420]  Out: 1405 [Emesis/NG output:650; Drains:505; Stool:250]      Gen: resting comfortably in bed in ICU, answering questions, whispering words, not in acute distress  CV: off pressors, regular HR per tele   Resp: non-labored, on room air  Abd: Abdominal incision in dressing, FARIDA with serosang output (5 ml/24h), no active bleeding, abdomen soft, non-tender  Ext: RLE wwp, palpable DP pulse, LLE stump incision with general surrounding erythema, no obvious bleeding, no obvious hematoma.   Prevena LLE placed on 10/23//23 - to be replaced in 5-7 days    Labs:  Recent Labs   Lab 10/25/23  0439 10/24/23  1849 10/24/23  1305 10/24/23  0756 10/24/23  0651   WBC 15.7*  --   --  12.3* 12.4*   HGB 5.8* 8.1* 8.1* 5.9* 5.8*     --   --  144* 142*       Recent Labs   Lab 10/25/23  0439 10/25/23  0343 10/24/23  2357 10/24/23  1210 10/24/23  0910 10/24/23  0651 10/23/23  1143 10/23/23  0555     --   --   --   --  134*  --  135   POTASSIUM 5.1  --   --   --   --  4.8  --  4.4   CHLORIDE 95*  --   --   --   --  97*  --  90*   CO2 24  --   --   --   --  25  --  27   BUN 86.8*  --   --   --   --  55.9*  --  120.0*   CR 4.35*  --   --   --  3.27* 3.13*  --  5.55*   * 142* 153*   < >  --  124*   < > 153*   OMAR 8.9  --   --   --   --  8.4*  --  8.7*   MAG 2.3  --   --   --   --  2.1  --   2.9*   PHOS 5.6*  --   --   --   --  3.7  --  4.2    < > = values in this interval not displayed.      Imaging reviewed.    Assessment/Plan:   68 year old male with PMH of HTN and previous TIA who presented with R sided abdominal pain found to have contained ruptured AAA, now s/p open AAA repair 9/24 into 9/25am with 30 min supraceliac clamp time, prolonged inter-renal clamp time, temporary abdominal closure. He did have bloody stool postop with flex sig 9/25 demonstrating ischemic mucosal changes of the rectum, repeated abdominal without evidence of transmural necrosis of the small or large intestine, or the rectum. On 9/29 he was started on CRRT given ongoing low UOP and rising Cr and failure of lasix challenge. Now on iHD. Hemodynamically continues to do well off pressors. S/p closure of abdominal fascia and subcutaneous tissue left open with wound VAC applied 10/2/23. With ischemic LLE, AKA deferred until 10/17/2023 due to leukocytosis, hypoxia requiring reintubation (10/13/23) and critical illness. ADDIS on 10/16/23 without evidence of embolic source. 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 On 10/20/23 underwent abdominal incision closure. On 10/24/23 found to have hgb 5.8 CTA demonstrated left psoas muscle hematoma now size 6 cm x 3 cm x 3 cm, barely seen on CTA from 10/13. Lesser sac of the peritoneal cavity collection stable, hematoma in left iliac stable, no active extravasation.  His heparin drip was stopped and patient required blood transfusions. Overnight patient more lethargic with fevers at 102.1 thought to be strokelike symptoms, stroke code called, CCTH negative for ICH or subacute CVA, CTA negative for LVO. Hemoglobin dropped again to 5.8, despite holding heparin for now 24 hours. Repeat CTA: triple phase CTA - noncon, arterial, and venous phases, pending.     Neuro:   - Appears intact: appreciate monitoring neuro status  - Stroke workup negative - follow-up with neurology 4-6 weeks after discharge, ok with  a/c.   - ADDIS for workup 10/16/23 demonstrated no thromboembolic source or shunt identified in cardiac chambers. Grade IV atheroma in descending aorta and aortic arch   CV:   - Off pressors, midodrine 20mg daily prn with HD runs   - Goal SBP <160, MAP >65  - Labetalol prn for SBP >160  - Continue statin  - PE+, venous duplex with nonocclusive to occlusive thrombus of the left GSV from the level of the knee to the groin and nonocclusive thrombus of the left distal femoral vein and popliteal veins, increased in extent compared to 9/30/2023.   - Therapeutic Heparin gtt on hold 10/24/23 given potential bleeding  - ADDIS 10/16/23 demonstrated no thromboembolic source or shunt, has grade IV atheroma in descending aorta and aortic arch   Resp:   - On room air  GI:   - Pancreatitis with peripancreatic fluid collection on CT with question of bleeding into the collection.  - Do not expect this anterior location of pancreatic fluid to be related to surgical procedure as we were in the RP and did see inferior/posterior aspect of pancreas. Fluid collection is not surrounding aortic graft, this is reassuring. GI signed off as collection not drainable.  - TF tolerating via NJT  - Change abd dressing daily  - Failed SLP, failed video swallow eval, continue NPO  FEN:   - Electrolyte replacement prn   Renal:   - Appreciate nephrology recommendations  - Continue iHD  - Tunneled line with IR placed 10/18/2023  Heme:   - Hemoglobin dropped again to 5.8 this morning 10/25/23, despite holding heparin for now 24 hours. Repeat CTA: triple phase CTA - noncon, arterial, and venous phases, pending.  - IR consulted for potential embolization 10/25  - GI consulted for assessment of GI bleeding source 10/25  - DVT as above, PE   - Therapeutic heparin gtt on hold 10/24/23 due to bleeding  - PT/OT ROM  ID:   - Fevers 102.1 on 10/25 at 4am. BCx pending, started cefepime and flagyl 10/25  - monitor signs of pneumonia  - nystatin swish for oral  candida  - monitor leukocytosis  MSK:   - L AKA 10/17/23: prevena applied 10/23/23  Derm:   - Has rash on abdomen, trunk, anterior bilateral thighs, erythematous, blanching, however maculopapular does not seem consistent with cellulitis  - Pharmacy reviewed medications for possible causes of rash and felt all were relatively low risk however not 0 risk   - benadryl cream topically applied with no improvement  - trialed abd binder off for short time to see if improvement (?moisture rash), none seen.   - Appreciate dermatology recs, likely morbilliform eruption vs. Low concern for possible DRESS   - CBC with diff daily stable   - LFTs daily    - triamcinolone 0.1% PRN  Misc:   - abd binder on at all times.    Discussed patient with Dr. Lowe, vascular surgery staff    Miryam Carrasco CNP  Vascular Surgery  Pager: [phone]  [email or web address]  Send message or 10 digit call back number Securely via [employer] with the [employer] Web Console (learn more here)

## 2023-10-25 NOTE — CONSULTS
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     Fever  Tmax 102.1 overnight, which resolved rapidly; associated tachycardia, hypertensive on AM of 10/25. No localizing symptoms, family notes delirium and rash. Bcx pending. CT with multiple hematomas associated with Hgb drop from 8.1 to 5.8, has required multiple transfusions over last 72 hours. Also with several embolic events during hospitalization. Unable to assess LLE stump site at this time. At this time favor noninfectious cause of fever, would stop antimicrobials and monitor. Does have pleural effusions L>R, will need to monitor.    Fungal colonies on surgical pathology  LLE thrombosis leading to critical limb ischemia, s/p fasciotomies and ultimately amputation on 10/17/23. Pathology with \"Ulcerated skin with fungal colonies and underlying ischemic dermis and subcutaneous tissue.\" Per report, superficial rather than deeper. Most likely would be yeast. No chest findings " to suggest fungal infection. Does have some risk factors for endemic fungal inoculation (woodworking, moved brush pile, [place] Blastomyces) so will check endemic fungal antigens. As this tissue was amputated, no need for treatment at this time.    Morbilliform rash  Diffuse rash onset ~10/13, evaluated by dermatology, felt to be possible drug rash related to cefepime. Rash had improved, though worsening on AM of 10/25 after cefepime given overnight. No evidence of DIHS, transaminitis resolved, will monitor as he does have return of rash and rise in eosinophils, which were previously improving 2.4-->1.9-->2.0.       RECOMMENDATION:  Stop cefepime, stop levofloxacin  Stop Flagyl  Stop micafungin  Monitor fever curve off of antimicrobials  Following blood cultures  Ordered for you: histoplasma urine ag, blastomyces urine ag, cryptococcal antigen  If spikes a fever >100.4F, repeat blood cultures  If decompensation with fever and hypotension would consider vancomycin (line associated or skin hakan infecting hematoma), with Zosyn as next line for worsening    Thank you for this consult. ID will continue to follow.     Patient was discussed with Dr. Garcia.     Amarilys Zarco PA-C  Infectious Disease  Pager # [phone]      55 MINUTES SPENT BY ME on the date of service doing chart review, history, exam, documentation & further activities per the note.       ________________________________________________________________    Consult Question: pt s/p open AAA repair for rupture 3 weeks ago. Now with ? Fungus growing on wounds from surg path of AKA, also with new fever spike last night to 102, has ongoing maculopapular rash. Would appreciate eval.  Admission Diagnosis: Hypertension, unspecified type [I10]  Infrarenal abdominal aortic aneurysm (AAA) without rupture (H24) [I71.43]  Infrarenal abdominal aortic aneurysm, ruptured (H) [I71.33]         History of Present Illness:     Alfredo Burnham is a 68 year old  male with history of HTN, and blindness due to macular degeneration who was admitted on 9/24/23 with abdominal pain, found to have ruptured pararenal aortic aneurysm. 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     History is obtained from Nelson's wife Tabby as he is drowsy and has been delirious. Rash has been mostly present on arms and back. It was better 2 days ago, started to come back yesterday evening and is persistent this morning. Nelson has been feeling like his leg is asleep and sometimes pulls at leg or bandage. Maybe some itching. No cough or vomiting.     Social hx: Lives with wife (Tabby) in an apartment [place]. They do not have any pets in the home. [place] [facility] near [place], [place] they cleaned out a brush pile about 6 weeks ago. Didn't find any attached ticks but it is possible. No rodent exposures, livestock or wild animal exposures. 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 Currently does woodworking and gardening for hobbies, helps with some  projects, most recently installing tile with his son at the son's house. No recent travel, has not been to the [facility]. Traveled to [place] 2 years ago, no other international travel.       Antimicrobials  Current:  - cefepime (10/25/2023-present)  - Flagyl (10/25/2023-present)    Prior:  - Clindamycin (10/17, 10/20)  - Levofloxacin (10/16-10/20)  - Cefepime (9/25-9/29, 10/1-10/3, 10/14-10/15)  - Flagyl (9/25-10/3)  - Cefazolin (filemon-op: 9/24, 9/25, 9/26, 9/29)         Past Medical History:     Past Medical History:   Diagnosis Date    Hypertension 2/8/2011    Macular degeneration             Past Surgical History:     Past Surgical History:   Procedure Laterality Date    AMPUTATE LEG ABOVE KNEE Left 10/17/2023     Procedure: AMPUTATION, ABOVE KNEE and Abdominal wound vac exchange;  Surgeon: Ash Boucher MBBS;  Location: [facility]    CLOSE SECONDARY WOUND ABDOMEN N/A 10/20/2023    Procedure: Delayed primary subcutaneous abdominal closure;  Surgeon: Boris Lowe;  Location: [facility]    INCISION AND DRAINAGE ABDOMEN WASHOUT, COMBINED N/A 9/25/2023    Procedure: abdominal washout, combined, repacking,  abthera placement;  Surgeon: Ash Boucher MBBS;  [facility]: [facility]    INCISION AND DRAINAGE ABDOMEN WASHOUT, COMBINED N/A 9/26/2023    Procedure: Abdominal washout, Retroperitoneal closure, washout left lower extremity wound;  Surgeon: Boris Lowe;  Location: [facility]    IR OR ANGIOGRAM  9/25/2023    IRRIGATION AND DEBRIDEMENT ABDOMEN WASHOUT, COMBINED N/A 9/29/2023    Procedure: exploration of  ABDOMINAL CAVITY, placement of abthera;  Surgeon: Boris Lowe;  Location: [facility]    IRRIGATION AND DEBRIDEMENT ABDOMEN WASHOUT, COMBINED N/A 10/2/2023    Procedure: Exploratory laparotomy, abominal closure, wound vac change left lower extremity;  Surgeon: Jonathan Fry MD;  Location: [facility]    IRRIGATION AND DEBRIDEMENT LOWER EXTREMITY, COMBINED Left 9/29/2023    Procedure: exploration of left lower extremity, partial closure of left lower extremity, wound vac placement;  Surgeon: Boris Lowe;  [facility]: [facility]    LAPAROTOMY EXPLORATORY N/A 9/25/2023    Procedure: Laparotomy exploratory;  Surgeon: Roger Shirley MD;  Location: [facility]    REPAIR ANEURYSM ABDOMINAL AORTA N/A 9/24/2023    Procedure: Resection of Ruptureed Abdominal Aneurysm, Aortic biliary bypass with 20 x 10 mm Bifurcated hemagard graft, Temporary Abdominal Closure, Endovascular Balloon Inclusion of Aorta;  Surgeon: Boris Lowe;  Location: [facility]    SIGMOIDOSCOPY FLEXIBLE N/A 9/25/2023    Procedure: Sigmoidoscopy flexible;  Surgeon: Gabino Walker MD;  Location: [facility]    THROMBECTOMY LOWER EXTREMITY Left 9/25/2023     Procedure: SFA, popliteal, and tibial thromboembolectomy and four compartment fasciotomy;  Surgeon: Ash Boucher MBBS;  Location: [facility]            Family History:   Reviewed and non-contributory.   Family History   Problem Relation Age of Onset    Unknown/Adopted Mother     Heart Disease Mother     Arthritis Mother     Hypertension Brother     Blood Disease Sister     Psychotic Disorder Sister             Social History:     Social History     Tobacco Use    Smoking status: Every Day     Packs/day: .5     Types: Cigarettes    Smokeless tobacco: Not on file   Substance Use Topics    Alcohol use: Yes     Comment: 1-2/wk     History   Sexual Activity    Sexual activity: Yes    Partners: Female            Current Medications:      acetaminophen  975 mg Oral or Feeding Tube Q6H    atorvastatin  10 mg Oral or Feeding Tube QPM    ceFEPIme  1 g Intravenous Q24H    hydrocortisone   Topical BID    insulin aspart  1-12 Units Subcutaneous Q4H    melatonin  1 mg Oral or Feeding Tube QPM    metroNIDAZOLE  500 mg Intravenous Q8H    multivitamins w/minerals  15 mL Per Feeding Tube Daily    pantoprazole  40 mg Oral or Feeding Tube QAM AC    protein modular  1 packet Per Feeding Tube TID    QUEtiapine  100 mg Oral or Feeding Tube At Bedtime    sodium chloride (PF)  10 mL Intravenous Once            Allergies:     Allergies   Allergen Reactions    Penicillins Swelling     Facial swelling    Has tolerated cefazolin, cefepime            Physical Exam:   Vitals were reviewed  Patient Vitals for the past 24 hrs:   BP Temp Temp src Pulse Resp SpO2 Weight   10/25/23 0900 (!) 159/89 98.7  F (37.1  C) -- 115 11 99 % --   10/25/23 0845 (!) 159/85 98.6  F (37  C) Oral 117 12 99 % --   10/25/23 0800 (!) 162/95 98.7  F (37.1  C) Oral (!) 122 12 98 % --   10/25/23 0700 (!) 141/75 -- -- 118 12 98 % --   10/25/23 0640 135/75 98.2  F (36.8  C) Oral 119 18 96 % --   10/25/23 0625 (!) 152/85 98.5  F (36.9  C) -- (!) 122 18 -- --   10/25/23 0600 (!)  146/87 -- -- 120 18 96 % --   10/25/23 0530 (!) 144/88 -- -- (!) 122 20 100 % --   10/25/23 0515 (!) 153/83 -- -- (!) 121 -- 99 % --   10/25/23 0500 (!) 163/96 -- -- (!) 122 17 97 % --   10/25/23 0445 137/88 -- -- (!) 124 (!) 6 100 % --   10/25/23 0430 -- -- -- 120 12 97 % --   10/25/23 0415 -- -- -- (!) 121 18 97 % --   10/25/23 0400 (!) 142/79 (!) 102.1  F (38.9  C) Oral (!) 124 19 96 % --   10/25/23 0345 (!) 146/87 -- -- (!) 122 19 95 % --   10/25/23 0330 -- -- -- (!) 121 21 95 % --   10/25/23 0315 -- -- -- (!) 121 15 92 % --   10/25/23 0300 128/76 -- -- 120 16 96 % --   10/25/23 0245 -- -- -- 120 12 96 % --   10/25/23 0230 -- -- -- 119 19 96 % --   10/25/23 0215 -- -- -- 120 18 97 % --   10/25/23 0200 106/73 -- -- (!) 125 21 94 % --   10/25/23 0145 -- -- -- (!) 125 24 97 % --   10/25/23 0130 -- -- -- (!) 132 (!) 9 96 % --   10/25/23 0115 -- -- -- (!) 129 14 96 % --   10/25/23 0100 135/75 -- -- (!) 131 14 97 % --   10/25/23 0045 -- -- -- (!) 131 (!) 0 97 % --   10/25/23 0030 -- -- -- (!) 133 (!) 0 97 % --   10/25/23 0015 -- -- -- (!) 134 13 97 % --   10/25/23 0000 120/77 99.4  F (37.4  C) Axillary (!) 132 14 96 % 74.1 kg (163 lb 5.8 oz)   10/24/23 2345 -- -- -- (!) 130 (!) 8 97 % --   10/24/23 2330 -- -- -- (!) 127 15 95 % --   10/24/23 2315 -- -- -- (!) 127 20 94 % --   10/24/23 2300 95/64 -- -- (!) 127 16 94 % --   10/24/23 2200 134/87 -- -- (!) 123 18 97 % --   10/24/23 2100 (!) 159/107 -- -- (!) 124 16 95 % --   10/24/23 2000 (!) 148/87 99.9  F (37.7  C) Axillary (!) 122 20 96 % --   10/24/23 1900 (!) 152/88 -- -- (!) 125 20 95 % --   10/24/23 1800 (!) 156/98 -- -- (!) 128 20 95 % --   10/24/23 1700 (!) 144/81 -- -- (!) 126 20 93 % --   10/24/23 1600 126/83 99  F (37.2  C) Axillary 120 20 100 % --   10/24/23 1500 (!) 154/116 -- -- (!) 124 20 94 % --   10/24/23 1400 (!) 151/86 -- -- 120 20 100 % --   10/24/23 1300 126/70 99.5  F (37.5  C) Axillary 112 18 97 % --   10/24/23 1200 (!) 144/82 99.6  F (37.6  C)  Axillary 120 20 93 % --   10/24/23 1100 (!) 144/97 98.5  F (36.9  C) Axillary 114 20 93 % --   10/24/23 1000 (!) 140/82 -- -- 108 (!) 0 97 % --   10/24/23 0958 -- 99  F (37.2  C) Axillary -- -- -- --   10/24/23 0946 (!) 139/100 99.1  F (37.3  C) Axillary 110 20 100 % --       Physical Examination:  GENERAL:  sleeping, awakes to voice and answers few questions.   HEENT:  Head is normocephalic, atraumatic   EYES:  Eyes have anicteric sclerae without conjunctival injection or discharge.    ENT:  Oropharynx is moist without exudates or ulcers.  LUNGS:  Clear to auscultation bilateral with transmission of upper airway crackles.   CARDIOVASCULAR:  RRR, +S1/S2, no murmur appreciated.  ABDOMEN:  Soft, +distended, + surgical incision without erythema or drainage. +rectal tube  SKIN:  Erythematous, blanching, confluent, macular rash on chest, upper arms, thighs, abdomen.  PIVs and right tunneled line are in place without any surrounding erythema or exudate. No stigmata of endocarditis.  EXTREMITIES: s/p AKA left lower extremity with vac and ace wrap in place. No swelling or joint specific erythema (does have blotchy rash) over wrists, elbows, shoulders, right knee, right ankle.         Laboratory Data:     Inflammatory Markers  No lab results found.    Hematology Studies    Recent Labs   Lab Test 10/25/23  0439 10/24/23  1849 10/24/23  1305 10/24/23  0756 10/24/23  0651 10/23/23  1849 10/23/23  0719 10/23/23  0555 10/22/23  1309 10/22/23  0422 10/21/23  0354 10/20/23  1340 10/20/23  0408 10/19/23  0317   WBC 15.7*  --   --  12.3* 12.4*  --   --  12.6*  --  11.3* 13.9*  --  14.2* 15.1*   ANEU  --   --   --   --  9.5*  --   --  9.3*  --  8.4* 9.3*  --  11.1* 12.5*   AEOS  --   --   --   --  1.9*  --   --  2.4*  --  2.0* 1.8*  --  1.4* 1.1*   HGB 5.8* 8.1* 8.1* 5.9* 5.8* 7.9*   < > 6.8*   < > 7.1* 8.0*   < > 7.0* 7.5*   MCV 94  --   --  95 95  --   --  96  --  94 90  --  88 88     --   --  144* 142*  --   --  185  --  167  159  --  147* 132*    < > = values in this interval not displayed.       Metabolic Studies     Recent Labs   Lab Test 10/25/23  0439 10/24/23  0910 10/24/23  0651 10/23/23  0555 10/22/23  0422 10/21/23  0354     --  134* 135 137 133*   POTASSIUM 5.1  --  4.8 4.4 4.2 3.8   CHLORIDE 95*  --  97* 90* 93* 93*   CO2 24  --  25 27 27 25   BUN 86.8*  --  55.9* 120.0* 101.0* 67.9*   CR 4.35* 3.27* 3.13* 5.55* 4.40* 3.00*   GFRESTIMATED 14* 20* 21* 10* 14* 22*       Hepatic Studies    Recent Labs   Lab Test 10/25/23  0439 10/24/23  0651 10/23/23  0555 10/22/23  0422 10/21/23  0354 10/20/23  0408   BILITOTAL 0.5 0.5 0.5 0.4 0.4 0.3   ALKPHOS 84 81 99 110 133* 106   ALBUMIN 3.1* 3.0* 3.0* 3.1* 2.7* 2.5*   AST 37 28 25 30 53* 40   ALT 23 23 25 31 49 34       Microbiology:  Culture   Date Value Ref Range Status   10/17/2023 2+ Staphylococcus epidermidis (A)  Final     Comment:     Susceptibilities not routinely done, refer to antibiogram to view typical susceptibility profiles   10/17/2023 1+ Candida albicans (A)  Final     Comment:     Susceptibilities not routinely done, refer to antibiogram to view typical susceptibility profiles   10/14/2023 No Growth  Final   10/14/2023 No Growth  Final   10/09/2023 No Growth  Final   10/09/2023 No Growth  Final   10/01/2023 2+ Candida albicans (A)  Final     Comment:     Susceptibilities not routinely done, refer to antibiogram to view typical susceptibility profiles   10/01/2023 2+ Debbi krusei (A)  Final     Comment:     Susceptibilities not routinely done, refer to antibiogram to view typical susceptibility profiles   10/01/2023 1+ Normal hakan  Final   10/01/2023 No Growth  Final   10/01/2023 No Growth  Final   09/28/2023 3+ Candida albicans (A)  Final     Comment:     Susceptibilities not routinely done, refer to antibiogram to view typical susceptibility profiles   09/28/2023 1+ Aspergillus fumigatus complex (A)  Final   09/28/2023 3+ Normal hakan  Final       Urine Studies   "  Recent Labs   Lab Test 10/01/23  1049 09/30/23  1029 09/30/23  0648   LEUKEST Negative Negative Negative   WBCU 7* 7* 12*       Vancomycin Levels  No lab results found.    Invalid input(s): \"VANCO\"    Hepatitis B Testing   Recent Labs   Lab Test 10/06/23  1543   HEPBANG Nonreactive     Hepatitis C Testing   No results found for: \"HCVAB\", \"HQTG\", \"HCGENO\", \"HCPCR\", \"HQTRNA\", \"HEPRNA\"  Respiratory Virus Testing    No results found for: \"RS\", \"FLUAG\"        Imaging:     CTA Head Neck w/ contrast (10/25/2023)  IMPRESSION:   HEAD CT:  1.  No acute intracranial abnormality.  2.  No hemorrhage or mass effect  3.  Scattered infarcts seen on the previous MRI are much better appreciated on that study. No definite new infarct by CT.     HEAD CTA:   1.  No high-grade stenosis, branch occlusion, or aneurysm.     NECK CTA:  1.  No high-grade stenosis or dissection.    CTA Chest/Abd/Pelvis (10/24/23)  IMPRESSION:  1. New intramuscular mixed density fluid collection in the left psoas  muscle, most likely a hematoma. No active extravasation.   2. Postsurgical changes of open repair of ruptured AAA. Stable size of  aneurysm sac with no definitive evidence of contrast enhancement in  the excluded aneurysm sac. Similar size of large intraperitoneal and  left iliacus muscle hematomas.  3. Stable size of left greater than right bilateral pleural effusions  with compressive atelectasis.    CTT Chest/Abd/Pelvis w/contrast (10/13/23)  IMPRESSION:   1.  Enlarged heterogeneously hypoattenuating left illiacus muscle  measuring up to 6.6 cm. Findings may represent developing iliacus  hematoma. Consider CTA abdomen/pelvis to further evaluate for active  bleeding within the hematoma.  2.  Narrow caliber infrahepatic IVC with appearance of compression in  between thrombus secondary to AAA rupture and abdominal aorta;  findings may represent both hypotension and potential compression  secondary to the surrounding hematoma.   3.  Bilateral, left " greater than right, wedge-shaped hypodensities  suggesting infarcts.   4.  Edematous left colon. There appears to be mucosal enhancement. No  evidence of pneumatosis intestinalis or free air in the abdomen.  5.  Pancreas appears perfused but there is peripancreatic fluid with  hemorrhage. No pseudoaneurysm or extravasation identified.   6.  Diffuse intraperitoneal ascites and mixed attenuating hematoma,  along the right inferior renal aorta and aortic bifurcation, likely  related to recent AAA rupture and subsequent repair.  7.  Bilateral pleural effusions with associated atelectasis and  groundglass opacities, likely representing pulmonary edema.  8.  Diffuse anasarca.

## 2023-10-25 NOTE — PROGRESS NOTES
SURGICAL ICU PROGRESS NOTE  10/25/2023        Date of Service (when I saw the patient): 10/25/2023    ASSESSMENT:  Alfredo Burnham is a 68 year old male who was admitted to the SICU on 9/24/2023 s/p open AAA repair. Patient has a history of HTN and previous TIA. He presented to the ED on 9/24 with right sided abdominal pain and was found to have a contained, ruptured AAA on CT. 30 min super-celiac clamp time. Complicated hospital course with renal failure requiring IHD, stroke and PE requiring anticoagulation (currently held), now s/p multiple abdominal washouts with primary closure for AAA, and L AKA    Notable events/procedures performed during hospital course:  - 9/24 - AAA repair  - 9/25 - flex sig showing rectal ischemia, abdominal washout, hemostatic AAA graft w/o evidence of bowel ischemia, unsuccessful embolectomy LLE   - 9/26 - abdominal washout, retroperitoneal closure, LLE wound washout  - 9/29 - repeat abd washout  - 10/2 - abdominal fascia closure  - 10/4 - extubated  - 10/13 - emergency re-intubation given desatting during dialysis  - 10/18 - L AKA with RAPS femoral nerve block pre-op  - 10/20 - primary skin closure for abdominal incision  - 10/25 - stroke code, Hb drop 3 points, psoas hematoma, re-admission by SICU    CHANGES and MAJOR THINGS TODAY:   - IR consult for concern of active bleeding  - GI consult for concern for GI bleed  - ID consult for fungal growth and ongoing fevers  - Added levaquin, flagyl, mycafungin for empiric coverage  - CTA to assess for active bleeding  - 2 U PRBCs for anemia, Hgb recheck elevated, trending q6 Hgb    PLAN:  Neurological:  # Acute Pain Control  - multi-modal pain control   - scheduled tylenol,   - PRN oxycodone, dilaudid, robaxin 500 mg TID)     # L lacunar infarction and punctate cerebellar lesions  # TTE without evidence of embolic source  # AMS requiring stroke code on 10/24  - Anticoagulation presently held in the setting of acute drops in  hemoglobin, previously on High intensity heparin gtt for PE  - Continue Lipitor 10 mg daily  - Neurology recommendations   - MRI Brain w/ w/o contrast when able   - Minimize opioids, benzos and anti-cholinergics   - TSH, ammonia, thiamine, ABG   - Delirium precautions   - May consider EEG if MRI negative  - Neurology Follow-up in 4-6 weeks after discharge previously planned given prior stroke    # Ongoing Delirium with possible mixed metabolic encephalopathy  - Seroquel 75qhs  - Precedex overnight gtt 10p-6a, 0.2 straight rate    Pulmonary:  # Acute hypoxic respiratory failure, resolved  # Re-intubation 10/13 (after prev extubation 10/3)  # Pulmonary embolism requiring anticoagulation  - Continue oxygen support as needed to maintain sats 92%+, currently satting appropriately on RA, 95%+  - Previously on high-intensity heparin gtt, held in the setting of acute drop in Hgb and concern for bleeding    Cardiovascular:    # Contained AAA rupture s/p open repair   # Acute critical limb ischaemia of LLE s/p AKA  # Hx of HTN, and persistent Tachycardia  # L Psoas hematoma on CTA  - Continue to monitor hemodynamic status - Goal MAP >65, SBP <160. Has maintained MAPs consistently  - Heparin drip held in the setting of concern for bleeding  - Continue Lipitor 10 mg daily     GI/Nutrition:    # s/p open AAA repair  # Post-operative melena, resolved  # Rectal ischemia, concern for, resolved  # Aspiration event 10/9  # L iliacus hematoma  # Peripancreatic hematoma   - Continue current bowel regimen, rectal tube in place  - Tube Fee  - Monitor electrolytes, replaced K and Mag  - continue NPO status per SLP, will continue to assess, appreciate assistance, failed video swallow  - GI consulted for concern for possible GI bleed       Renal/Fluids/Electrolytes:  # Protein calorie deficit malnutrition, risk of   # Acute kidney injury  # Oliguria  # Haemodialysis  #Hyponatremia   - HD per nephrology, w/ tunneled dialysis line  - Strict  intake and output  - Monitor electrolytes and replace, high protocol for Mag, Phos and K    Endocrine:  # Stress hyperglycaemia  - High SSI for glucose control  - q6h BG checks     Infectious disease:   # oropharyngeal candidiasis  # Likely aspiration event during ADDIS  # Fungal growth on AKA biopsy  - Cultures sent for fever, and uptrending WC, follow  - Started on Cefepime, Flagyl initially, transitioned to Levaquin for empiric coverage given concern for DRESS  - Mycafungin started empirically for fungal coverage  - Infectious disease consulted, appreciate recommendations    Hematology:    # Acute blood loss anemia  # concern for bleeding, with multiple abdominal (periaortic, filemon-psoas, and stump)  - Received 2 U PRBCs for low Hgb   - Repeat Hgb q6 h until stable  - Interventional radiology consulted for possible active bleeding, appreciate recommendations  - GI bleed is very low on the differential, but possible, thus GI consulted, appreciate recommendations  - Oozing around tunneled dialysis line, IR notified.  - Heparin gtt held    MSK:  # Weakness and deconditioning of critical illness  # Left lower limb ischemia s/p AKA  # Stump hematoma in LLE  - Continue PT/OT as tolerated  - Follow IR recs for active extrav concerns, if any further indication is warranted  - Continue wound vac placed by vascular surgery for drainage    Skin:  #Likely Spongotic Dermatitis vs. DRESS, concern of rising eosinophils on differential  - Dermatology following, appreciate recs  - Continuing Triamcinolone and Hydrocortisone PRN   - Trend LFTs regularly  - Changed Cefepime to Levaquin given concern for drug reaction    General Cares/Prophylaxis:    DVT Prophylaxis: Pneumatic Compression Devices; Heparin held   GI Prophylaxis: PPI  Restraints: Restraints for medical healing needed: YES     Lines/ tubes/ drains:  -R tunneled HD line  -NDT  -NGT  -PIVs in RUE    Disposition:  - Surgical ICU    Patient seen, findings and plan discussed  with surgical ICU staff, Dr. Mccall.    Clint Braga MD  Surgical ICU  ====================================  INTERVAL HISTORY:   Transferred back to SICU for stroke code, infectious etiology, and concern for active bleeding. Subjectively doing okay, denies acute pain.     ROS negative other than previously mentioned.       OBJECTIVE:   1. VITAL SIGNS:   Temp:  [97.5  F (36.4  C)-102.1  F (38.9  C)] 98.2  F (36.8  C)  Pulse:  [] 119  Resp:  [0-24] 18  BP: ()/() 135/75  SpO2:  [92 %-100 %] 96 %  Resp: 18      2. INTAKE/ OUTPUT:   I/O last 3 completed shifts:  In: 1863.78 [I.V.:293.78; NG/GT:420]  Out: 1405 [Emesis/NG output:650; Drains:505; Stool:250]    3. PHYSICAL EXAMINATION:  General: Comfortable on RA. Speech is soft and muffled.   HEENT: normocephalic,atraumatic  Neuro: opens eyes to command, moves all extremities equally. Oriented to self.  Pulm/Resp: Equal chest rise, no respiratory distress  CV: hypertensive, off pressors  Abdomen: Abdomen mildly distended. Midline laparotomy incision. Dressing in place, CDI. abdominal binder in place.  Gu: decreased scrotal swelling.  MSK/Extremities: L AKA wrapped and dressed with wound-vac in place, draining serosanguinous output.      4. INVESTIGATIONS:   Arterial Blood Gases   No lab results found in last 7 days.    Complete Blood Count   Recent Labs   Lab 10/25/23  0439 10/24/23  1849 10/24/23  1305 10/24/23  0756 10/24/23  0651 10/23/23  0719 10/23/23  0555   WBC 15.7*  --   --  12.3* 12.4*  --  12.6*   HGB 5.8* 8.1* 8.1* 5.9* 5.8*   < > 6.8*     --   --  144* 142*  --  185    < > = values in this interval not displayed.     Basic Metabolic Panel  Recent Labs   Lab 10/25/23  0439 10/25/23  0343 10/24/23  2357 10/24/23  2024 10/24/23  1210 10/24/23  0910 10/24/23  0651 10/23/23  1143 10/23/23  0555 10/22/23  0427 10/22/23  0422     --   --   --   --   --  134*  --  135  --  137   POTASSIUM 5.1  --   --   --   --   --  4.8  --  4.4  --  4.2    CHLORIDE 95*  --   --   --   --   --  97*  --  90*  --  93*   CO2 24  --   --   --   --   --  25  --  27  --  27   BUN 86.8*  --   --   --   --   --  55.9*  --  120.0*  --  101.0*   CR 4.35*  --   --   --   --  3.27* 3.13*  --  5.55*  --  4.40*   * 142* 153* 136*   < >  --  124*   < > 153*   < > 129*    < > = values in this interval not displayed.     Liver Function Tests  Recent Labs   Lab 10/25/23  0439 10/24/23  0651 10/23/23  0555 10/22/23  0422   AST 37 28 25 30   ALT 23 23 25 31   ALKPHOS 84 81 99 110   BILITOTAL 0.5 0.5 0.5 0.4   ALBUMIN 3.1* 3.0* 3.0* 3.1*     Pancreatic Enzymes  No lab results found in last 7 days.    Coagulation Profile  No lab results found in last 7 days.        5. RADIOLOGY:   Recent Results (from the past 24 hour(s))   CTA CHEST ABDOMEN PELVIS WITH CONTRAST PANEL   Result Value    Radiologist flags (Urgent)     New intramuscular hematoma of the left psoas muscle    Narrative    CTA CHEST, ABDOMEN, AND PELVIS WITH 3D AND MULTIPLANAR RECONSTRUCTIONS  10/24/2023 9:11 AM    CLINICAL HISTORY: Hgb drop to 5.8 from 7.4, postop aaa open repair  patient, unclear source of bleeding. Resection of ruptured abdominal  aneurysm with aortobiiliac bifurcated graft placement 9/26/2023.  Abdominal washout 10/2/2023. Abdominal closure 10/20/2023.    COMPARISONS: CT chest, abdomen, and pelvis 10/13/2023    REFERRING PROVIDER: KY YOON    TECHNIQUE: Unenhanced CT performed through the chest, abdomen, and  pelvis. After the uneventful administration of intravenous contrast,  CTA performed through the chest, abdomen, and pelvis. After a 3 minute  delay, CT repeated through the abdomen. Coronal and sagittal  reconstructions were produced. Lung MIP produced.    3D and multiplanar reconstructions were produced and reviewed.    CONTRAST: 90 mL Isovue 370    DOSE (DLP): 1245 mGy*cm    FINDINGS: CTA:    Postsurgical changes of open repair of ruptured AAA. Stable size of  aneurysm sac extending just  above the origin of the most inferior left  renal artery to the level of bifurcation and measuring up to 4.5 cm,  previously 4.6 cm when measured in similar fashion. No evidence of  contrast opacification of the excluded aneurysmal sac. New mixed  density fluid collection with fluid-fluid levels in the left psoas  muscle measuring 6 x 3 x 3 cm. Large intraperitoneal hematoma  predominantly in the lesser sac, intramuscular hematoma in the left  illiacus muscle are similar to prior.     Aortic measurements:       Sinuses of Valsalva: 37 mm       Sinotubular junction: 29 mm       Ascendin mm       Arch: 33 mm       Proximal descendin mm       Mid descendin mm       Diaphragm: 27 mm    Similar appearance of mild aneurysmal dilation of the common trunk of  the right brachiocephalic and left common carotid arteries. There is  mild noncalcified plaque at the origin of the right brachiocephalic  artery. Origins of left common carotid artery and left subclavian  artery show no focal abnormality. The proximal pulmonary vasculature  appears normal. No central PE.    Celiac and superior mesenteric artery patent. Inferior mesenteric  artery origin occluded. Distal branches reconstituted by collaterals.  Early takeoff of the left hepatic artery. Single right renal artery is  patent. 3 left renal arteries. Severe stenosis at the origin of the  most inferior left renal artery.    Aneurysmal dilation of the right greater than left common iliac and  common femoral arteries are similar to prior.    Chest and abdomen:   Similar size of left greater than right bilateral pleural effusions  with compression atelectasis. Scattered mild subpleural opacities are  most likely atelectasis. No hilar, mediastinal or axillary  lymphadenopathy is seen. Similar appearance of wedge-shaped areas of  hypoenhancement in the kidneys, most likely infarcts. Similar size of  right renal cyst. Similar size of hypoenhancing lesion in the  right  lobe of the liver. Increased layering hyperdensity in the gallbladder,  most likely vicarious excretion of the contrast. The spleen, adrenal  glands, pancreas, kidneys show no focal abnormalities. Normal-sized  large and small bowel. Similar appearance of small ascites. Residual  contrast seen in small and large bowels. Stable grade 1  anterolisthesis of L5 over S1 with left-sided pars defect.      Impression    IMPRESSION:  1. New intramuscular mixed density fluid collection in the left psoas  muscle, most likely a hematoma. No active extravasation.     2. Postsurgical changes of open repair of ruptured AAA. Stable size of  aneurysm sac with no definitive evidence of contrast enhancement in  the excluded aneurysm sac. Similar size of large intraperitoneal and  left iliacus muscle hematomas.    3. Stable size of left greater than right bilateral pleural effusions  with compressive atelectasis.    [Urgent Result: New intramuscular hematoma of the left psoas muscle]    Finding was identified on 10/24/2023 10:15 AM.     KY YOON was contacted by Dr. Winston at 10/24/2023 10:23 AM and  verbalized understanding of the urgent finding.     I have personally reviewed the examination and initial interpretation  and I agree with the findings.    MARY ONEIL MD         SYSTEM ID:  [id number]   XR Abdomen Port 1 View    Narrative    Exam: XR ABDOMEN PORT 1 VIEW, 10/24/2023 12:16 PM    Indication: new NJ placed    Comparison: 10/24/2023    Findings:   Feeding tube tip projects over the distal duodenum. Positive enteric  contrast material is coursing within the large bowel. Orogastric tube  tip and sidehole projected over the stomach. No objective bowel gas  pattern. No chronic stool burden. Post surgical changes of ruptured  AAA repair and large vertical skin staple line and percutaneous  drains. Partially visualized lung bases are clear.      Impression    Impression: Post pyloric feeding tube. No obstructive bowel  gas  pattern.    I have personally reviewed the examination and initial interpretation  and I agree with the findings.    FILOMENA WAHL MD         SYSTEM ID:  [id number]   CT Head w/o Contrast    Narrative    EXAM: CT HEAD WITHOUT CONTRAST, CTA HEAD NECK WITH CONTRAST  LOCATION: [facility]  DATE: 10/25/2023    INDICATION: AMS: new onset lethargy.  COMPARISON: Brain MRI from 10/07/2023  CONTRAST: 67ml isovue 370  TECHNIQUE: Head and neck CT angiogram with IV contrast. Noncontrast head CT followed by axial helical CT images of the head and neck vessels obtained during the arterial phase of intravenous contrast administration. Axial 2D reconstructed images and   multiplanar 3D MIP reconstructed images of the head and neck vessels were performed by the technologist. Dose reduction techniques were used. All stenosis measurements made according to NASCET criteria unless otherwise specified.    FINDINGS:   NONCONTRAST HEAD CT:   INTRACRANIAL CONTENTS: No intracranial hemorrhage, extra-axial collection, or mass effect.  No CT evidence of acute infarct. Mild volume loss and presumed chronic small vessel ischemia are stable. Multiple scattered infarcts seen in the cerebellum,   thalami and periventricular white matter are much better appreciated on prior MRI. No definite new infarct by CT.    VISUALIZED ORBITS/SINUSES/MASTOIDS: No intraorbital abnormality. Moderate mucosal thickening of the left maxillary sinus. Mild scattered paranasal sinus mucosal disease elsewhere. No middle ear or mastoid effusion.    BONES/SOFT TISSUES: No acute abnormality.    HEAD CTA: Mild intracranial atheromatous disease.  ANTERIOR CIRCULATION: No stenosis/occlusion, aneurysm, or high-flow vascular malformation. Standard [place] of [place] anatomy.    POSTERIOR CIRCULATION: No stenosis/occlusion, aneurysm, or high-flow vascular malformation. Balanced vertebral arteries supply a normal basilar artery.      DURAL VENOUS SINUSES: Expected enhancement of the major dural venous sinuses.    NECK CTA: Scattered atheromatous disease.  RIGHT CAROTID: No measurable stenosis or dissection.    LEFT CAROTID: No measurable stenosis or dissection.    VERTEBRAL ARTERIES: No focal stenosis or dissection. Balanced vertebral arteries.    AORTIC ARCH: Classic aortic arch anatomy with no significant stenosis at the origin of the great vessels.    NONVASCULAR STRUCTURES: Unremarkable.      Impression    IMPRESSION:   HEAD CT:  1.  No acute intracranial abnormality.    2.  No hemorrhage or mass effect    3.  Scattered infarcts seen on the previous MRI are much better appreciated on that study. No definite new infarct by CT.    HEAD CTA:   1.  No high-grade stenosis, branch occlusion, or aneurysm.    NECK CTA:  1.  No high-grade stenosis or dissection.      Findings were discussed with Dr. Donald at 06:00 hours, 10/25/2023.     CTA Head Neck with Contrast    Narrative    EXAM: CT HEAD WITHOUT CONTRAST, CTA HEAD NECK WITH CONTRAST  LOCATION: [facility]  DATE: 10/25/2023    INDICATION: AMS: new onset lethargy.  COMPARISON: Brain MRI from 10/07/2023  CONTRAST: 67ml isovue 370  TECHNIQUE: Head and neck CT angiogram with IV contrast. Noncontrast head CT followed by axial helical CT images of the head and neck vessels obtained during the arterial phase of intravenous contrast administration. Axial 2D reconstructed images and   multiplanar 3D MIP reconstructed images of the head and neck vessels were performed by the technologist. Dose reduction techniques were used. All stenosis measurements made according to NASCET criteria unless otherwise specified.    FINDINGS:   NONCONTRAST HEAD CT:   INTRACRANIAL CONTENTS: No intracranial hemorrhage, extra-axial collection, or mass effect.  No CT evidence of acute infarct. Mild volume loss and presumed chronic small vessel ischemia are stable. Multiple  scattered infarcts seen in the cerebellum,   thalami and periventricular white matter are much better appreciated on prior MRI. No definite new infarct by CT.    VISUALIZED ORBITS/SINUSES/MASTOIDS: No intraorbital abnormality. Moderate mucosal thickening of the left maxillary sinus. Mild scattered paranasal sinus mucosal disease elsewhere. No middle ear or mastoid effusion.    BONES/SOFT TISSUES: No acute abnormality.    HEAD CTA: Mild intracranial atheromatous disease.  ANTERIOR CIRCULATION: No stenosis/occlusion, aneurysm, or high-flow vascular malformation. Standard [place] of Chopra anatomy.    POSTERIOR CIRCULATION: No stenosis/occlusion, aneurysm, or high-flow vascular malformation. Balanced vertebral arteries supply a normal basilar artery.     DURAL VENOUS SINUSES: Expected enhancement of the major dural venous sinuses.    NECK CTA: Scattered atheromatous disease.  RIGHT CAROTID: No measurable stenosis or dissection.    LEFT CAROTID: No measurable stenosis or dissection.    VERTEBRAL ARTERIES: No focal stenosis or dissection. Balanced vertebral arteries.    AORTIC ARCH: Classic aortic arch anatomy with no significant stenosis at the origin of the great vessels.    NONVASCULAR STRUCTURES: Unremarkable.      Impression    IMPRESSION:   HEAD CT:  1.  No acute intracranial abnormality.    2.  No hemorrhage or mass effect    3.  Scattered infarcts seen on the previous MRI are much better appreciated on that study. No definite new infarct by CT.    HEAD CTA:   1.  No high-grade stenosis, branch occlusion, or aneurysm.    NECK CTA:  1.  No high-grade stenosis or dissection.      Findings were discussed with Dr. Donald at 06:00 hours, 10/25/2023.

## 2023-10-25 NOTE — PROGRESS NOTES
I was paged by the RN that patient was lethargic, slow to respond and had temp 102.1 F. I assessed the patient bedside. Patient somnolent, responding and following commands very occasionally. Vitals temp 102.1, , RR 20, /88, Sat >95% on RA.    Exam findings:  Neuro:   Patient intermittently responding when called his name, but not oriented to time, place, or person. Lethargic.   Pupils bilaterally 3mm, reactive.   Unable to do proper neuro exam as patient was not following commands appropriately.    Motor exam- RLE 3+, bilateral UEs at least 2+     CV- Sinus tachycardia  RS- NLB on RA  Skin- generalised maculopapular rash, midline incision with staples on, has focal blisters with necrosis     Assessment- Altered mental status. Non focal neuro exam, possible toxic/metabolic etiology/sepsis     Ordered CBC, BMP, POC glucose, Mg, Phos, lactate, ABG, blood culture, urine culture. Code stroke was also called for AMS. Ordered stat CT head and CTA head and neck.     ABG- normal ph, Blood glucose 141, lactate 2.7, WBC count 15.7, Hgb 5.8. CCTH negative for ICH or subacute CVA, CTA negative for LVO (independent read by the stroke MD).    Discussed with Dr Donald/Dr Niño. Ordered iv cefepime and flagyl, 2 U PRBC. Further care by primary team.     Chiara HERRERA  PGY1 General Surgery  [facility]  Surgery Cross Cover

## 2023-10-25 NOTE — PLAN OF CARE
Goal Outcome Evaluation:  .ICU End of Shift Summary. See flowsheets for vital signs and detailed assessment.    Changes this shift:   A&O x2/3 waxing and waning. 's-130's. Tmax 102.1. Abdominal incision blisters forming next to staples, MD aware.   Stroke code called at 430, due to decreased responsiveness, see note from neurology and surgery resident.   Critical Hgb 5.8 - ordered 2 units of RBC's, started first unit.     Plan:  Continue to follow plan of care and notify provider of any changes.

## 2023-10-26 ENCOUNTER — APPOINTMENT (OUTPATIENT)
Dept: PHYSICAL THERAPY | Facility: CLINIC | Age: 68
DRG: 268 | End: 2023-10-26
Payer: COMMERCIAL

## 2023-10-26 ENCOUNTER — APPOINTMENT (OUTPATIENT)
Dept: SPEECH THERAPY | Facility: CLINIC | Age: 68
DRG: 268 | End: 2023-10-26
Payer: COMMERCIAL

## 2023-10-26 LAB
ALBUMIN SERPL BCG-MCNC: 2.8 G/DL (ref 3.5–5.2)
ALP SERPL-CCNC: 111 U/L (ref 40–129)
ALT SERPL W P-5'-P-CCNC: 20 U/L (ref 0–70)
ANION GAP SERPL CALCULATED.3IONS-SCNC: 14 MMOL/L (ref 7–15)
AST SERPL W P-5'-P-CCNC: 52 U/L (ref 0–45)
BASE EXCESS BLDV CALC-SCNC: 3.4 MMOL/L (ref -7.7–1.9)
BASOPHILS # BLD AUTO: ABNORMAL 10*3/UL
BASOPHILS # BLD MANUAL: 0 10E3/UL (ref 0–0.2)
BASOPHILS NFR BLD AUTO: ABNORMAL %
BASOPHILS NFR BLD MANUAL: 0 %
BILIRUB DIRECT SERPL-MCNC: <0.2 MG/DL (ref 0–0.3)
BILIRUB SERPL-MCNC: 0.4 MG/DL
BUN SERPL-MCNC: 51.5 MG/DL (ref 8–23)
CA-I BLD-MCNC: 4.2 MG/DL (ref 4.4–5.2)
CALCIUM SERPL-MCNC: 8.4 MG/DL (ref 8.8–10.2)
CHLORIDE SERPL-SCNC: 96 MMOL/L (ref 98–107)
CREAT SERPL-MCNC: 2.94 MG/DL (ref 0.67–1.17)
DEPRECATED HCO3 PLAS-SCNC: 24 MMOL/L (ref 22–29)
EGFRCR SERPLBLD CKD-EPI 2021: 22 ML/MIN/1.73M2
EOSINOPHIL # BLD AUTO: ABNORMAL 10*3/UL
EOSINOPHIL # BLD MANUAL: 2.4 10E3/UL (ref 0–0.7)
EOSINOPHIL NFR BLD AUTO: ABNORMAL %
EOSINOPHIL NFR BLD MANUAL: 18 %
ERYTHROCYTE [DISTWIDTH] IN BLOOD BY AUTOMATED COUNT: 17 % (ref 10–15)
ERYTHROCYTE [DISTWIDTH] IN BLOOD BY AUTOMATED COUNT: 17 % (ref 10–15)
GLUCOSE BLDC GLUCOMTR-MCNC: 114 MG/DL (ref 70–99)
GLUCOSE BLDC GLUCOMTR-MCNC: 122 MG/DL (ref 70–99)
GLUCOSE BLDC GLUCOMTR-MCNC: 123 MG/DL (ref 70–99)
GLUCOSE BLDC GLUCOMTR-MCNC: 124 MG/DL (ref 70–99)
GLUCOSE BLDC GLUCOMTR-MCNC: 125 MG/DL (ref 70–99)
GLUCOSE BLDC GLUCOMTR-MCNC: 133 MG/DL (ref 70–99)
GLUCOSE BLDC GLUCOMTR-MCNC: 137 MG/DL (ref 70–99)
GLUCOSE SERPL-MCNC: 120 MG/DL (ref 70–99)
HCO3 BLDV-SCNC: 28 MMOL/L (ref 21–28)
HCT VFR BLD AUTO: 24.9 % (ref 40–53)
HCT VFR BLD AUTO: 26.2 % (ref 40–53)
HGB BLD-MCNC: 8.2 G/DL (ref 13.3–17.7)
HGB BLD-MCNC: 8.4 G/DL (ref 13.3–17.7)
HGB BLD-MCNC: 8.4 G/DL (ref 13.3–17.7)
HGB BLD-MCNC: 8.6 G/DL (ref 13.3–17.7)
IMM GRANULOCYTES # BLD: ABNORMAL 10*3/UL
IMM GRANULOCYTES NFR BLD: ABNORMAL %
LACTATE SERPL-SCNC: 2.6 MMOL/L (ref 0.7–2)
LYMPHOCYTES # BLD AUTO: ABNORMAL 10*3/UL
LYMPHOCYTES # BLD MANUAL: 0.3 10E3/UL (ref 0.8–5.3)
LYMPHOCYTES NFR BLD AUTO: ABNORMAL %
LYMPHOCYTES NFR BLD MANUAL: 2 %
MAGNESIUM SERPL-MCNC: 1.8 MG/DL (ref 1.7–2.3)
MCH RBC QN AUTO: 30.4 PG (ref 26.5–33)
MCH RBC QN AUTO: 30.9 PG (ref 26.5–33)
MCHC RBC AUTO-ENTMCNC: 32.1 G/DL (ref 31.5–36.5)
MCHC RBC AUTO-ENTMCNC: 33.7 G/DL (ref 31.5–36.5)
MCV RBC AUTO: 90 FL (ref 78–100)
MCV RBC AUTO: 96 FL (ref 78–100)
MONOCYTES # BLD AUTO: ABNORMAL 10*3/UL
MONOCYTES # BLD MANUAL: 1.1 10E3/UL (ref 0–1.3)
MONOCYTES NFR BLD AUTO: ABNORMAL %
MONOCYTES NFR BLD MANUAL: 8 %
MYELOCYTES # BLD MANUAL: 0.1 10E3/UL
MYELOCYTES NFR BLD MANUAL: 1 %
NEUTROPHILS # BLD AUTO: ABNORMAL 10*3/UL
NEUTROPHILS # BLD MANUAL: 9.4 10E3/UL (ref 1.6–8.3)
NEUTROPHILS NFR BLD AUTO: ABNORMAL %
NEUTROPHILS NFR BLD MANUAL: 71 %
NRBC # BLD AUTO: 0 10E3/UL
NRBC BLD AUTO-RTO: 0 /100
O2/TOTAL GAS SETTING VFR VENT: 0 %
OXYHGB MFR BLDV: 60 % (ref 70–75)
PCO2 BLDV: 39 MM HG (ref 40–50)
PH BLDV: 7.46 [PH] (ref 7.32–7.43)
PHOSPHATE SERPL-MCNC: 4.5 MG/DL (ref 2.5–4.5)
PLAT MORPH BLD: ABNORMAL
PLATELET # BLD AUTO: 125 10E3/UL (ref 150–450)
PLATELET # BLD AUTO: 128 10E3/UL (ref 150–450)
PO2 BLDV: 31 MM HG (ref 25–47)
POLYCHROMASIA BLD QL SMEAR: SLIGHT
POTASSIUM SERPL-SCNC: 4.2 MMOL/L (ref 3.4–5.3)
PROT SERPL-MCNC: 5.9 G/DL (ref 6.4–8.3)
RADIOLOGIST FLAGS: ABNORMAL
RBC # BLD AUTO: 2.72 10E6/UL (ref 4.4–5.9)
RBC # BLD AUTO: 2.76 10E6/UL (ref 4.4–5.9)
RBC MORPH BLD: ABNORMAL
SODIUM SERPL-SCNC: 134 MMOL/L (ref 135–145)
WBC # BLD AUTO: 13.2 10E3/UL (ref 4–11)
WBC # BLD AUTO: 15.7 10E3/UL (ref 4–11)

## 2023-10-26 PROCEDURE — 85027 COMPLETE CBC AUTOMATED: CPT

## 2023-10-26 PROCEDURE — 85018 HEMOGLOBIN: CPT | Performed by: NURSE PRACTITIONER

## 2023-10-26 PROCEDURE — G0463 HOSPITAL OUTPT CLINIC VISIT: HCPCS

## 2023-10-26 PROCEDURE — 85027 COMPLETE CBC AUTOMATED: CPT | Performed by: NURSE PRACTITIONER

## 2023-10-26 PROCEDURE — 36415 COLL VENOUS BLD VENIPUNCTURE: CPT | Performed by: STUDENT IN AN ORGANIZED HEALTH CARE EDUCATION/TRAINING PROGRAM

## 2023-10-26 PROCEDURE — 82805 BLOOD GASES W/O2 SATURATION: CPT | Performed by: STUDENT IN AN ORGANIZED HEALTH CARE EDUCATION/TRAINING PROGRAM

## 2023-10-26 PROCEDURE — 92526 ORAL FUNCTION THERAPY: CPT | Mod: GN

## 2023-10-26 PROCEDURE — 85007 BL SMEAR W/DIFF WBC COUNT: CPT

## 2023-10-26 PROCEDURE — 99024 POSTOP FOLLOW-UP VISIT: CPT | Performed by: NURSE PRACTITIONER

## 2023-10-26 PROCEDURE — 120N000002 HC R&B MED SURG/OB UMMC

## 2023-10-26 PROCEDURE — 87385 HISTOPLASMA CAPSUL AG IA: CPT | Performed by: PHYSICIAN ASSISTANT

## 2023-10-26 PROCEDURE — 250N000013 HC RX MED GY IP 250 OP 250 PS 637: Performed by: NURSE PRACTITIONER

## 2023-10-26 PROCEDURE — 82330 ASSAY OF CALCIUM: CPT | Performed by: PHYSICIAN ASSISTANT

## 2023-10-26 PROCEDURE — 250N000013 HC RX MED GY IP 250 OP 250 PS 637

## 2023-10-26 PROCEDURE — 36415 COLL VENOUS BLD VENIPUNCTURE: CPT | Performed by: NURSE PRACTITIONER

## 2023-10-26 PROCEDURE — 99232 SBSQ HOSP IP/OBS MODERATE 35: CPT | Mod: FS | Performed by: STUDENT IN AN ORGANIZED HEALTH CARE EDUCATION/TRAINING PROGRAM

## 2023-10-26 PROCEDURE — 36415 COLL VENOUS BLD VENIPUNCTURE: CPT | Performed by: PHYSICIAN ASSISTANT

## 2023-10-26 PROCEDURE — 99233 SBSQ HOSP IP/OBS HIGH 50: CPT | Performed by: NURSE PRACTITIONER

## 2023-10-26 PROCEDURE — 84100 ASSAY OF PHOSPHORUS: CPT | Performed by: PHYSICIAN ASSISTANT

## 2023-10-26 PROCEDURE — 250N000013 HC RX MED GY IP 250 OP 250 PS 637: Performed by: SURGERY

## 2023-10-26 PROCEDURE — 99232 SBSQ HOSP IP/OBS MODERATE 35: CPT | Performed by: PHYSICIAN ASSISTANT

## 2023-10-26 PROCEDURE — 83735 ASSAY OF MAGNESIUM: CPT | Performed by: PHYSICIAN ASSISTANT

## 2023-10-26 PROCEDURE — 83605 ASSAY OF LACTIC ACID: CPT | Performed by: PHYSICIAN ASSISTANT

## 2023-10-26 PROCEDURE — 97530 THERAPEUTIC ACTIVITIES: CPT | Mod: GP | Performed by: PHYSICAL THERAPIST

## 2023-10-26 PROCEDURE — 250N000011 HC RX IP 250 OP 636: Mod: JZ | Performed by: NURSE PRACTITIONER

## 2023-10-26 PROCEDURE — C9113 INJ PANTOPRAZOLE SODIUM, VIA: HCPCS | Mod: JZ | Performed by: NURSE PRACTITIONER

## 2023-10-26 PROCEDURE — 80053 COMPREHEN METABOLIC PANEL: CPT

## 2023-10-26 RX ORDER — HEPARIN SODIUM 10000 [USP'U]/100ML
500 INJECTION, SOLUTION INTRAVENOUS CONTINUOUS
Status: DISCONTINUED | OUTPATIENT
Start: 2023-10-26 | End: 2023-10-28

## 2023-10-26 RX ORDER — CALCIUM GLUCONATE 20 MG/ML
2 INJECTION, SOLUTION INTRAVENOUS ONCE
Status: COMPLETED | OUTPATIENT
Start: 2023-10-26 | End: 2023-10-26

## 2023-10-26 RX ADMIN — HYDROCORTISONE: 25 CREAM TOPICAL at 19:44

## 2023-10-26 RX ADMIN — OXYCODONE HYDROCHLORIDE 5 MG: 5 TABLET ORAL at 21:50

## 2023-10-26 RX ADMIN — ACETAMINOPHEN 975 MG: 325 TABLET, FILM COATED ORAL at 04:14

## 2023-10-26 RX ADMIN — Medication 1 MG: at 17:23

## 2023-10-26 RX ADMIN — HEPARIN SODIUM 500 UNITS/HR: 10000 INJECTION, SOLUTION INTRAVENOUS at 11:07

## 2023-10-26 RX ADMIN — QUETIAPINE FUMARATE 100 MG: 100 TABLET ORAL at 21:50

## 2023-10-26 RX ADMIN — ACETAMINOPHEN 975 MG: 325 TABLET, FILM COATED ORAL at 17:24

## 2023-10-26 RX ADMIN — ATORVASTATIN CALCIUM 10 MG: 10 TABLET, FILM COATED ORAL at 19:44

## 2023-10-26 RX ADMIN — QUETIAPINE FUMARATE 25 MG: 25 TABLET ORAL at 07:39

## 2023-10-26 RX ADMIN — ACETAMINOPHEN 650 MG: 325 TABLET, FILM COATED ORAL at 15:36

## 2023-10-26 RX ADMIN — PANTOPRAZOLE SODIUM 40 MG: 40 INJECTION, POWDER, FOR SOLUTION INTRAVENOUS at 07:37

## 2023-10-26 RX ADMIN — ACETAMINOPHEN 975 MG: 325 TABLET, FILM COATED ORAL at 23:46

## 2023-10-26 RX ADMIN — CALCIUM GLUCONATE 2 G: 20 INJECTION, SOLUTION INTRAVENOUS at 07:49

## 2023-10-26 RX ADMIN — HYDROCORTISONE: 25 CREAM TOPICAL at 07:37

## 2023-10-26 RX ADMIN — Medication 15 ML: at 07:37

## 2023-10-26 ASSESSMENT — ACTIVITIES OF DAILY LIVING (ADL)
ADLS_ACUITY_SCORE: 55

## 2023-10-26 NOTE — PROGRESS NOTES
Nephrology Progress Note  10/26/2023       Alfredo Burnham is a 68 yom with complex hx of TIA, HTN, blindness who presented to [facility] 9/24 with severe abdominal pain radiating to flank and back, CT revealed AAA with a contained rupture.  Taken to OR for aortobiiliac bypass and resection of ruptured pararenal aneurysm.   Post op course complicated by hypotension requiring pressors and metabolic acidosis.  Baseline Cr 1.0 on presentation but on the rise to >5 at time of renal consult for MAYA management and possible RRT.       Interval History :   Mr Burnham had HD yesterday, has ongoing concern for bleeding from unclear source.  Pulled 2L yesterday without issue, plan for today off and will run tomorrow.   Hgb stable the past 24h after 2u PRBC's yesterday.       Assessment & Recommendations:   MAYA-Baseline Cr 1.0, ordered UA.  CT showed benign cyst but otherwise normal kidneys.  Cr on the rise since surgery, did receive contrast for CTA.  Urine microscopy showed granular casts suggesting ATN which will recover with time and stabilizing hemodynamics.  Started on CRRT 9/30 for volume and clearance with minimal UOP and rising Cr.  Transitioned to iHD on 10/5.      -Holding on run today.  Plan for HD tomorrow.         -Appreciate IR placing tunneled line RIJ 10/18.                  -Dialysis consent signed and scanned into media tab.      -Infarcts seen on CT 10/13 lowers chances of recovery.          Volume-Receiving multiple PRBC's, more SOB subjectively and had B line on POCUS before run yesterday.  Planning UF enough to keep up with intake tomorrow as long as BP's are stable.      Electrolytes-K 4.2, bicarb 24, Na 134.       BMD-Ca 8.4, Mg 1.8.  Phos 4.5, no issues.        Anemia-Hgb 8.4 and stable the past ~24h, continuing to assess for bleeding.   ~14 on presentation, acute management per team.       Nutrition-On Osmolite TF.       Time spent: 40 minutes on this date of encounter for chart review, physical  "exam, medical decision making and co-ordination of care.       Seen and discussed with Dr Ibarra    Recommendations were communicated to primary team via verbal communication.        ALTAGRACIA Jiménez CNS  Clinical Nurse Specialist  [phone]    Review of Systems:   I reviewed the following systems:  ROS not done due to lethargy    Physical Exam:   I/O last 3 completed shifts:  In: 2326.67 [I.V.:220; NG/GT:385]  Out: 2315 [Drains:115; Other:2000; Stool:200]   /74   Pulse 113   Temp 97.5  F (36.4  C) (Oral)   Resp 13   Ht 1.727 m (5' 8\")   Wt 72.9 kg (160 lb 11.5 oz)   SpO2 97%   BMI 24.44 kg/m       GENERAL APPEARANCE: Extubated, lethargic, in no distress.   EYES: No scleral icterus  Pulmonary: On vent 40%/8 of PEEP  CV: Regular rhythm, normal rate   - Edema +1-2 generalized, + scrotal edema.    GI: distended, nontender  MS: no evidence of inflammation in joints, no muscle tenderness  : No Lu  SKIN: no rash, warm, dry  NEURO: No focal deficits.         Labs:   All labs reviewed by me  Electrolytes/Renal -   Recent Labs   Lab Test 10/26/23  1109 10/26/23  0736 10/26/23  0700 10/25/23  1144 10/25/23  1112 10/25/23  0734 10/25/23  0439 10/24/23  0910 10/24/23  0651   NA  --   --  134*  --  133*  --  135  --  134*   POTASSIUM  --   --  4.2  --  5.1  --  5.1  --  4.8   CHLORIDE  --   --  96*  --  96*  --  95*  --  97*   CO2  --   --  24  --  25  --  24  --  25   BUN  --   --  51.5*  --  88.6*  --  86.8*  --  55.9*   CR  --   --  2.94*  --  4.59*  --  4.35*   < > 3.13*   * 125* 120*   < > 149*   < > 141*   < > 124*   OMAR  --   --  8.4*  --  8.7*  --  8.9  --  8.4*   MAG  --   --  1.8  --   --   --  2.3  --  2.1   PHOS  --   --  4.5  --   --   --  5.6*  --  3.7    < > = values in this interval not displayed.       CBC -   Recent Labs   Lab Test 10/26/23  1051 10/26/23  0700 10/26/23  0113 10/25/23  1112 10/25/23  0439   WBC 15.7* 13.2*  --   --  15.7*   HGB 8.4* 8.4* 8.2*   < > 5.8*   * " 128*  --   --  171    < > = values in this interval not displayed.       LFTs -   Recent Labs   Lab Test 10/26/23  0700 10/25/23  0439 10/24/23  0651   ALKPHOS 111 84 81   BILITOTAL 0.4 0.5 0.5   ALT 20 23 23   AST 52* 37 28   PROTTOTAL 5.9* 6.2* 5.5*   ALBUMIN 2.8* 3.1* 3.0*       Iron Panel - No lab results found.        Current Medications:   acetaminophen  975 mg Oral or Feeding Tube Q6H    atorvastatin  10 mg Oral or Feeding Tube QPM    hydrocortisone   Topical BID    insulin aspart  1-12 Units Subcutaneous Q4H    melatonin  1 mg Oral or Feeding Tube QPM    multivitamins w/minerals  15 mL Per Feeding Tube Daily    [START ON 10/27/2023] pantoprazole  40 mg Intravenous Q24H    protein modular  1 packet Per Feeding Tube Daily    QUEtiapine  100 mg Oral or Feeding Tube At Bedtime    sodium chloride (PF)  10 mL Intravenous Once      dextrose      dextrose Stopped (10/22/23 2106)    heparin 500 Units/hr (10/26/23 1200)

## 2023-10-26 NOTE — CONSULTS
[facility]  WO Nurse Inpatient Assessment     Consulted for: midline abdominal incision    Patient History (according to provider note(s):      68 year old male who was admitted to the SICU on 9/24/2023 s/p open AAA repair. Patient has a history of HTN and previous TIA. He presented to the ED on 9/24 with right sided abdominal pain and was found to have a contained, ruptured AAA on CT. 30 min super-celiac clamp time. Complicated hospital course with renal failure requiring IHD, stroke and PE requiring anticoagulation (currently held), now s/p multiple abdominal washouts with primary closure for AAA, and L AKA     Assessment:      Areas visualized during today's visit: Focused: and Abdomen  Wound location: midline incision     10/25, 10/26  Last photo: 10/26/23  Wound due to: Medical Adhesive Related Skin Injury (MARSI) vs drug eruption(see Derm note from 1025) vs unknown etiology.  not consistent with edema  Wound history/plan of care: Cefepime discontinued due to concern for Morbilliform drug eruption, possibly 2/2 cefepime.   Wound base: 100 % intact serum and blood filled blisters.  Largest approximately 1 cm in diameter.  Unchanged in 24 hrs.    Incison is well approximated with staples, no gapping, no erythema focused around the incision.       Palpation of the wound bed: normal      Drainage: none     Description of drainage: none     Periwound skin: Intact      Color: normal and consistent with surrounding tissue      Temperature: normal   Odor: none  Pain: denies ,   Pain interventions prior to dressing change: patient tolerated well  Treatment goal: Heal  and Protection  STATUS: initial assessment  Supplies ordered: gathered and supplies stored on unit      Treatment Plan:     Midline incision blisters:  daily   Cleanse blisters by gently blotting with wound cleanser soaked gauze.  Pat dry   Apply vaseline to blisters.   Cover incision and blisters with  primapore dressing.       Orders: Written    RECOMMEND PRIMARY TEAM ORDER: None, at this time  Education provided: plan of care  Discussed plan of care with: Family and Nurse  [facility] nurse follow-up plan: weekly  Notify WOC if wound(s) deteriorate.  Nursing to notify the Provider(s) and re-consult the WO Nurse if new skin concern.    DATA:     Current support surface: Standard  Low air loss (FARSHAD pump, Isolibrium, Pulsate, skin guard, etc)  BMI: Body mass index is 24.44 kg/m .   Active diet order: Orders Placed This Encounter      NPO for Medical/Clinical Reasons Except for: Meds     Output: I/O last 3 completed shifts:  In: 2326.67 [I.V.:220; NG/GT:385]  Out: 2315 [Drains:115; Other:2000; Stool:200]     Labs:   Recent Labs   Lab 10/26/23  1051 10/26/23  0700 10/25/23  1112 10/25/23  1040   ALBUMIN  --  2.8*  --   --    HGB 8.4* 8.4*   < >  --    INR  --   --   --  1.24*   WBC 15.7* 13.2*  --   --     < > = values in this interval not displayed.     Pressure injury risk assessment:   Sensory Perception: 2-->very limited  Moisture: 3-->occasionally moist  Activity: 2-->chairfast  Mobility: 2-->very limited  Nutrition: 3-->adequate  Friction and Shear: 2-->potential problem  Edson Score: 14    Pager no longer is use, please contact through Cait Chavira group: [facility] Nurse [place]  Dept. Office Number: [phone]

## 2023-10-26 NOTE — PROGRESS NOTES
Vascular Surgery Progress Note  10/26/2023       Subjective:  Slightly confused this morning, with episode of incontinence x1.  Hemoglobin stable this morning, FARIDA drain 15 mL in the last 24 hours, VAC drained at 100 mL over 24 hours and 0 mL since midnight.  LLE stump continues to remain semisoft, without signs or symptoms of compartment syndrome or expanding hematoma. Hemoglobin stable this morning at 8.4. Leukocytosis slightly improved down to 13.2. Out of bed and activity with PT.   Objective:  Temp:  [97.5  F (36.4  C)-98.9  F (37.2  C)] 98.2  F (36.8  C)  Pulse:  [107-122] 112  Resp:  [10-21] 18  BP: ()/() 102/84  SpO2:  [91 %-100 %] 95 %    I/O last 3 completed shifts:  In: 2326.67 [I.V.:220; NG/GT:385]  Out: 2315 [Drains:115; Other:2000; Stool:200]      Gen: resting comfortably in bed in ICU, answering questions, whispering words, not in acute distress  CV: off pressors, regular HR per tele with PACs  Resp: non-labored, on room air  Abd: Abdominal incision in dressing, FARIDA with serosang output (15 ml/24h), no active bleeding, abdomen soft, non-tender  Ext: RLE wwp, palpable DP pulse, LLE stump incision with general surrounding erythema, no obvious bleeding, no obvious hematoma.   Prevena LLE placed on 10/23//23 - to be replaced in 5-7 days    Labs:  Recent Labs   Lab 10/26/23  0113 10/25/23  1814 10/25/23  1112 10/25/23  0439 10/24/23  1305 10/24/23  0756 10/24/23  0651   WBC  --   --   --  15.7*  --  12.3* 12.4*   HGB 8.2* 8.9* 8.6* 5.8*   < > 5.9* 5.8*   PLT  --   --   --  171  --  144* 142*    < > = values in this interval not displayed.       Recent Labs   Lab 10/26/23  0423 10/26/23  0031 10/25/23  1955 10/25/23  1144 10/25/23  1112 10/25/23  0734 10/25/23  0439 10/24/23  1210 10/24/23  0910 10/24/23  0651 10/23/23  1143 10/23/23  0555   NA  --   --   --   --  133*  --  135  --   --  134*  --  135   POTASSIUM  --   --   --   --  5.1  --  5.1  --   --  4.8  --  4.4   CHLORIDE  --   --   --   --   96*  --  95*  --   --  97*  --  90*   CO2  --   --   --   --  25  --  24  --   --  25  --  27   BUN  --   --   --   --  88.6*  --  86.8*  --   --  55.9*  --  120.0*   CR  --   --   --   --  4.59*  --  4.35*  --  3.27* 3.13*  --  5.55*   * 137* 125*   < > 149*   < > 141*   < >  --  124*   < > 153*   OMAR  --   --   --   --  8.7*  --  8.9  --   --  8.4*  --  8.7*   MAG  --   --   --   --   --   --  2.3  --   --  2.1  --  2.9*   PHOS  --   --   --   --   --   --  5.6*  --   --  3.7  --  4.2    < > = values in this interval not displayed.      Imaging reviewed.    Assessment/Plan:   68 year old male with PMH of HTN and previous TIA who presented with R sided abdominal pain found to have contained ruptured AAA, now s/p open AAA repair 9/24 into 9/25am with 30 min supraceliac clamp time, prolonged inter-renal clamp time, temporary abdominal closure. He did have bloody stool postop with flex sig 9/25 demonstrating ischemic mucosal changes of the rectum, repeated abdominal without evidence of transmural necrosis of the small or large intestine, or the rectum. On 9/29 he was started on CRRT given ongoing low UOP and rising Cr and failure of lasix challenge. Now on iHD. Hemodynamically continues to do well off pressors. S/p closure of abdominal fascia and subcutaneous tissue left open with wound VAC applied 10/2/23. With ischemic LLE, AKA deferred until 10/17/2023 due to leukocytosis, hypoxia requiring reintubation (10/13/23) and critical illness. ADDIS on 10/16/23 without evidence of embolic source. 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 On 10/20/23 underwent abdominal incision closure.     On 10/24/23 found to have hgb 5.8 CTA demonstrated left psoas muscle hematoma now size 6 cm x 3 cm x 3 cm, barely seen on CTA from 10/13. Lesser sac of the peritoneal cavity collection stable, hematoma in left iliac stable, no active extravasation. His heparin drip was stopped and patient required blood transfusions.     Overnight  10/24-10/25 patient more lethargic with fevers at 102.1 thought to be strokelike symptoms, stroke code called, CCTH negative for ICH or subacute CVA, CTA negative for LVO. Hemoglobin dropped again to 5.8, despite holding heparin for now 24 hours. Repeat CTA: triple phase CTA - noncon, arterial, and venous phases, demonstrated hematoma in LLE stump, with otherwise stable left iliac hematoma and stable left psoas muscle hematoma. Patient's hgb stable for the last 24 hours.     Neuro:   - Appears intact: appreciate monitoring neuro status. Delirium on/off.   - Stroke workup negative - follow-up with neurology 4-6 weeks after discharge, ok with a/c.   - ADDIS for workup 10/16/23 demonstrated no thromboembolic source or shunt identified in cardiac chambers. Grade IV atheroma in descending aorta and aortic arch   CV:   - Off pressors, midodrine 20mg daily prn with HD runs   - Goal SBP <160, MAP >65  - Labetalol prn for SBP >160  - Continue statin  - PE+, venous duplex with nonocclusive to occlusive thrombus of the left GSV from the level of the knee to the groin and nonocclusive thrombus of the left distal femoral vein and popliteal veins, increased in extent compared to 9/30/2023.   - Resume heparin drip at 500 units fixed dose  - ADDIS 10/16/23 demonstrated no thromboembolic source or shunt, has grade IV atheroma in descending aorta and aortic arch   Resp:   - On room air  GI:   - Pancreatitis with peripancreatic fluid collection on CT with question of bleeding into the collection.  - Do not expect this anterior location of pancreatic fluid to be related to surgical procedure as we were in the RP and did see inferior/posterior aspect of pancreas. Fluid collection is not surrounding aortic graft, this is reassuring. GI signed off as collection not drainable.  - TF tolerating via NJT  - Change abd dressing daily  - Failed SLP, failed video swallow eval, continue NPO  FEN:   - Electrolyte replacement prn   Renal:   -  Appreciate nephrology recommendations  - Continue iHD  - Tunneled line with IR placed 10/18/2023  Heme:   - Hemoglobin stable this morning at 8.4.   - DVT as above, PE   - Resume heparin drip at 500 units fixed dose today 10/26/23  - PT/OT ROM  ID:   - Fevers 102.1 on 10/25 at 4am. BCx pending, received 1 dose of cefepime and flagyl on 10/25, now off antibiotics with slightly improving leukocytosis. Patietn has allergy to cefepime (hives). Continue to monitor for fevers, leukocytosis, hemodynamic stability.  - monitor signs of pneumonia  - nystatin swish for oral candida  - monitor leukocytosis  MSK:   - L AKA 10/17/23: prevena applied 10/23/23  Derm:   - Has rash on abdomen, trunk, anterior bilateral thighs, erythematous, blanching, however maculopapular does not seem consistent with cellulitis. Cefepime induced.   - Pharmacy reviewed medications for possible causes of rash and felt all were relatively low risk however not 0 risk   - benadryl cream topically applied with no improvement  - Appreciate dermatology recs, likely morbilliform eruption vs. Low concern for possible DRESS   - CBC with diff daily stable   - LFTs daily    - triamcinolone 0.1% PRN  Misc:   - abd binder on at all times.    Discussed patient with Dr. Lowe, vascular surgery staff    Miryam Carrasco CNP  Vascular Surgery  Pager: [phone]  [email or web address]  Send message or 10 digit call back number Securely via [employer] with the Vocera Web Console (learn more here)

## 2023-10-26 NOTE — PROGRESS NOTES
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, s/p open AAA repair (9/24/23), Hemagard Dacron graft; abdominal wall closure 10/2/23  LLE ischemia S/p attempted thrombectomy, AK[facility] on 10/17/23  MAYA, renal infarcts, now on HD    Recommendations:  Monitor fever curve off of antimicrobials  Following blood cultures from time of fever  Ordered for you: histoplasma urine ag, blastomyces urine ag (converted to blood as not producing urine today)  If spikes a fever >100.4F, repeat blood cultures  If decompensation with fever and hypotension would consider vancomycin (line associated or skin hakan infecting hematoma), with Zosyn as next line for worsening    Discussion:  Alfredo Burnham is a 68 year old male with history of HTN, and blindness due to macular degeneration who was admitted on 9/24/23 with abdominal pain, found to have ruptured pararenal aortic aneurysm. 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      Fever  Tmax 102.1 overnight on 10/24, which resolved rapidly; associated tachycardia, hypertensive on AM of 10/25. No localizing symptoms. Bcx NGTD. CT with multiple hematomas associated with Hgb drop from 8.1 to 5.8, has required multiple transfusions over last 72 hours. Also with several embolic events during hospitalization. Unable to assess LLE stump site at this time, CT of area with hematoma. At this time favor noninfectious cause of fever, monitor off of antimicrobials.    "  Fungal colonies on surgical pathology  LLE thrombosis leading to critical limb ischemia, s/p fasciotomies and ultimately amputation on 10/17/23. Pathology with \"Ulcerated skin with fungal colonies and underlying ischemic dermis and subcutaneous tissue.\" Per report, superficial rather than deeper. Most likely would be yeast. No chest findings to suggest fungal infection. Does have some risk factors for endemic fungal inoculation (woodworking, moved brush [place], [place] with Blastomyces) so will check endemic fungal antigens- CrAg neg. As this tissue was amputated, no need for treatment at this time.     Morbilliform rash  Diffuse rash onset ~10/13, evaluated by dermatology, felt to be possible drug rash related to cefepime. Rash had improved, though worsening on AM of 10/25 after cefepime given overnight. No evidence of DIHS, transaminitis resolved, will monitor as he does have return of rash and rise in eosinophils, which were previously improving 2.4-->1.9-->2.0-->2.4.       Recs were discussed with primary team today. Don't hesitate to call with questions.     Attestation:  I have reviewed today's vital signs, medications, labs and imaging.  Amarilys Zarco PA-C, Pager # [phone]         40 MINUTES SPENT BY ME on the date of service doing chart review, history, exam, documentation & further activities per the note.             Interval History:       Afebrile, tachycardic, BP stable. No pressor requirement. More alert today. No new symptoms. Has been bothered by lines and medical devices. No new painful areas. Wife (Tabby) visiting.         Current Antimicrobials   Current:  - none    Prior:  - micafungin (10/25)  - Clindamycin (10/17, 10/20)  - Levofloxacin (10/16-10/20)  - Cefepime (9/25-9/29, 10/1-10/3, 10/14-10/15, 10/25)  - Flagyl (9/25-10/3, 10/25)  - Cefazolin (filemon-op: 9/24, 9/25, 9/26, 9/29)         Physical Exam:   Ranges for vital signs:  Temp:  [97.5  F (36.4  C)-98.9  F (37.2  C)] 97.5  F " (36.4  C)  Pulse:  [107-119] 113  Resp:  [11-21] 13  BP: ()/() 130/72  SpO2:  [91 %-100 %] 96 %    Intake/Output Summary (Last 24 hours) at 10/26/2023 1415  Last data filed at 10/26/2023 1400  Gross per 24 hour   Intake 1629.42 ml   Output 2460 ml   Net -830.58 ml     Exam:  GENERAL:  awake, alert, interactive. Soft voice. Moving arms frequently.  ENT:  Head is normocephalic, atraumatic. NGT in place. Oropharynx is moist  EYES:  Eyes have anicteric sclerae, noninjected conjunctivae  LUNGS:  Clear to auscultation.  CARDIOVASCULAR:  tachycardic, regular rhythm, no murmur appreciated  ABDOMEN:  Normal bowel sounds, soft, nontender.  EXT: RLE nonedematous. LLE with ace wrap and vac in place.  SKIN:  Blotchy blanching, confluent, macular rash- somewhat improved (less pronounced on chest and arms) on abdomen, back, arms, thighs. Line is in place without any surrounding erythema.         Laboratory Data:   Reviewed.  Pertinent for:    Culture data:  Microbiology:  Culture   Date Value Ref Range Status   10/25/2023 No growth after 1 day  Preliminary   10/25/2023 No growth after 1 day  Preliminary   10/17/2023 2+ Staphylococcus epidermidis (A)  Final     Comment:     Susceptibilities not routinely done, refer to antibiogram to view typical susceptibility profiles   10/17/2023 1+ Candida albicans (A)  Final     Comment:     Susceptibilities not routinely done, refer to antibiogram to view typical susceptibility profiles   10/14/2023 No Growth  Final   10/14/2023 No Growth  Final   10/09/2023 No Growth  Final   10/09/2023 No Growth  Final   10/01/2023 2+ Candida albicans (A)  Final     Comment:     Susceptibilities not routinely done, refer to antibiogram to view typical susceptibility profiles   10/01/2023 2+ Debbi krusei (A)  Final     Comment:     Susceptibilities not routinely done, refer to antibiogram to view typical susceptibility profiles   10/01/2023 1+ Normal hakan  Final   10/01/2023 No Growth  Final  "  10/01/2023 No Growth  Final   09/28/2023 3+ Candida albicans (A)  Final     Comment:     Susceptibilities not routinely done, refer to antibiogram to view typical susceptibility profiles   09/28/2023 1+ Aspergillus fumigatus complex (A)  Final   09/28/2023 3+ Normal hakan  Final       Inflammatory Markers    No lab results found.    Invalid input(s): \"RATE\", \"AUTO\", \"ESR\", \"WESR\"    Hematology Studies    Recent Labs   Lab Test 10/26/23  1051 10/26/23  0700 10/26/23  0113 10/25/23  1814 10/25/23  1112 10/25/23  0439 10/24/23  1305 10/24/23  0756   WBC 15.7* 13.2*  --   --   --  15.7*  --  12.3*   HGB 8.4* 8.4* 8.2* 8.9*   < > 5.8*   < > 5.9*   MCV 90 96  --   --   --  94  --  95   * 128*  --   --   --  171  --  144*    < > = values in this interval not displayed.     Recent Labs   Lab Test 10/26/23  0700 10/24/23  0651 10/23/23  0555 10/22/23  0422   ANEU 9.4* 9.5* 9.3* 8.4*   AEOS 2.4* 1.9* 2.4* 2.0*       Metabolic Studies     Recent Labs   Lab Test 10/26/23  0700 10/25/23  1112 10/25/23  0439 10/24/23  0910 10/24/23  0651 09/29/23  1211 09/29/23  1210 09/25/23  0630 09/25/23  0344   * 133* 135  --  134*   < > 140   < > 139   POTASSIUM 4.2 5.1 5.1  --  4.8   < > 4.0   < > 4.3   CHLORIDE 96* 96* 95*  --  97*   < > 109*   < > 101   CO2 24 25 24  --  25   < > 18*   < > 14*   BUN 51.5* 88.6* 86.8*  --  55.9*   < > 57.0*   < > 14.1   CR 2.94* 4.59* 4.35* 3.27* 3.13*   < > 5.77*   < > 1.08   GFRESTIMATED 22* 13* 14* 20* 21*   < > 10*   < > 74   ANIONGAP 14 12 16*  --  12   < > 13   < > 24*   A1C  --   --   --   --   --   --   --   --  5.7*   LACT 2.6*  --  2.7*  --  2.5*   < >  --    < > 11.2*   CKT  --   --   --   --   --   --  1,503*  --   --     < > = values in this interval not displayed.       Hepatic Studies    Recent Labs   Lab Test 10/26/23  0700 10/25/23  0439 10/24/23  0651   BILITOTAL 0.4 0.5 0.5   ALKPHOS 111 84 81   ALBUMIN 2.8* 3.1* 3.0*   AST 52* 37 28   ALT 20 23 23              Imaging: "     CTA Chest/abd/Pelvis w/contrast (10/25/23)  Impression:  1. Postsurgical changes of open repair of ruptured AAA.   2. Unchanged size of large retroperitoneal and left iliopsoas muscle  hematoma. No evidence of active extravasation.  3. Unchanged size of bilateral pleural effusions and small ascites  with increased density compared to last scan. No evidence of active  extravasation in the pleural space or peritoneal cavity.  4. The left lower extremity above-the-knee amputation stump had not  been previously imaged.  Large hematoma with enhancement immediately  adjacent the hematoma, without active contrast extravasation  visualized.    CTA Head Neck w/ contrast (10/25/2023)  IMPRESSION:   HEAD CT:  1.  No acute intracranial abnormality.  2.  No hemorrhage or mass effect  3.  Scattered infarcts seen on the previous MRI are much better appreciated on that study. No definite new infarct by CT.     HEAD CTA:   1.  No high-grade stenosis, branch occlusion, or aneurysm.     NECK CTA:  1.  No high-grade stenosis or dissection.     CTA Chest/Abd/Pelvis (10/24/23)  IMPRESSION:  1. New intramuscular mixed density fluid collection in the left psoas  muscle, most likely a hematoma. No active extravasation.   2. Postsurgical changes of open repair of ruptured AAA. Stable size of  aneurysm sac with no definitive evidence of contrast enhancement in  the excluded aneurysm sac. Similar size of large intraperitoneal and  left iliacus muscle hematomas.  3. Stable size of left greater than right bilateral pleural effusions  with compressive atelectasis.     CTT Chest/Abd/Pelvis w/contrast (10/13/23)  IMPRESSION:   1.  Enlarged heterogeneously hypoattenuating left illiacus muscle  measuring up to 6.6 cm. Findings may represent developing iliacus  hematoma. Consider CTA abdomen/pelvis to further evaluate for active  bleeding within the hematoma.  2.  Narrow caliber infrahepatic IVC with appearance of compression in  between thrombus  secondary to AAA rupture and abdominal aorta;  findings may represent both hypotension and potential compression  secondary to the surrounding hematoma.   3.  Bilateral, left greater than right, wedge-shaped hypodensities  suggesting infarcts.   4.  Edematous left colon. There appears to be mucosal enhancement. No  evidence of pneumatosis intestinalis or free air in the abdomen.  5.  Pancreas appears perfused but there is peripancreatic fluid with  hemorrhage. No pseudoaneurysm or extravasation identified.   6.  Diffuse intraperitoneal ascites and mixed attenuating hematoma,  along the right inferior renal aorta and aortic bifurcation, likely  related to recent AAA rupture and subsequent repair.  7.  Bilateral pleural effusions with associated atelectasis and  groundglass opacities, likely representing pulmonary edema.  8.  Diffuse anasarca.

## 2023-10-26 NOTE — PLAN OF CARE
ICU End of Shift Summary. See flowsheets for vital signs and detailed assessment.    Changes this shift: Patient slept most part of the shift, alert & oriented x 2-3, follows command, denies pain, afebrile, occasional soft blood pressure with MAP of 64, sinus tach with PVC's, room air, lung sound clear diminished, tube feed rate change to new goal rate 45 ml/hr, no urine output this shift, hemoglobin remains stable.  0530 patient pulling on lines, not redirectable, bilateral mitt restraint apply.     Plan: Continue to monitor for signs of bleeding.    Goal Outcome Evaluation:      Plan of Care Reviewed With: patient    Overall Patient Progress: no changeOverall Patient Progress: no change

## 2023-10-26 NOTE — PROGRESS NOTES
Alfredo Burnham is a 68 year old male with complicated PMHx of HTN, TIA, b/l legally blind who is p/w ruptured AAA s/p repair (9/24/23) c/b bowel ischemia s/p multiple washouts, MAYA requiring HD, PE/DVT, multifocal infarcts 2/2 ESUS (10/6), LLE ischemia s/p AKA (10/17), abdominal incision closure (10/20), & recent Left psoas muscle hematoma s/p transfusion x2 units PRBC for which his Heparin gtt was also stopped (10/24). Stroke code called on 10/25 @ 0430 for decreased responsiveness. LKW ~0200.    Per stroke team during course of code, patient's wakefulness fluctuated appearing at times more alert and interactive while also more readily following commands. Imaging findings negative for ICH or subacute CVA or LVO. Patient was not a candidate for TNK/thrombectomy.    Nothing further to do from a stroke/NCC standpoint. NCC will be signing off. Please call [phone] for any further questions or concerns.    Farrah FRIAS CNP  Neurocritical care nurse practitioner  Pager: [phone]  Ascom: [phone] available [place] [phone]

## 2023-10-26 NOTE — PROGRESS NOTES
SURGICAL ICU PROGRESS NOTE  10/26/2023        Date of Service (when I saw the patient): 10/26/2023    ASSESSMENT:  Alfredo Burnham is a 68 year old male who was admitted to the SICU on 9/24/2023 s/p open AAA repair. Patient has a history of HTN and previous TIA. He presented to the ED on 9/24 with right sided abdominal pain and was found to have a contained, ruptured AAA on CT. 30 min super-celiac clamp time. Complicated hospital course with renal failure requiring IHD, stroke and PE requiring anticoagulation (currently held), now s/p multiple abdominal washouts with primary closure for AAA, and L AKA    Notable events/procedures performed during hospital course:  - 9/24 - AAA repair  - 9/25 - flex sig showing rectal ischemia, abdominal washout, hemostatic AAA graft w/o evidence of bowel ischemia, unsuccessful embolectomy LLE   - 9/26 - abdominal washout, retroperitoneal closure, LLE wound washout  - 9/29 - repeat abd washout  - 10/2 - abdominal fascia closure  - 10/4 - extubated  - 10/13 - emergency re-intubation given desatting during dialysis  - 10/18 - L AKA with RAPS femoral nerve block pre-op  - 10/20 - primary skin closure for abdominal incision  - 10/25 - stroke code, Hb drop 3 points, iliopsoas hematoma, re-admission by SICU    CHANGES and MAJOR THINGS TODAY:   -No acute changes overnight  -Hemodynamically stable, no pressors  -tolerated HD 2L removed 10/25    PLAN:  Neurological:  # Acute Pain Control  - multi-modal pain control   - scheduled tylenol,   - PRN oxycodone, dilaudid, robaxin 500 mg TID    # L lacunar infarction and punctate cerebellar lesions  # TTE without evidence of embolic source  # AMS requiring stroke code on 10/24  - Anticoagulation presently held in the setting of acute drops in hemoglobin, previously on High intensity heparin gtt for PE  - Continue Lipitor 10 mg daily  - Neurology recommendations   - MRI Brain w/ w/o contrast when able   - Minimize opioids, benzos and  anti-cholinergics   - TSH elevated, Ammonia WNL   - Delirium precautions  - Neurology Follow-up in 4-6 weeks after discharge previously planned given prior stroke    # Ongoing Delirium with possible mixed metabolic encephalopathy  - Seroquel 100mg qhs  - limits narcotics and benzodiazepines if able     Pulmonary:  # Acute hypoxic respiratory failure, resolved  # Re-intubation 10/13 (after prev extubation 10/3)  # Pulmonary embolism requiring anticoagulation  - currently on RA, tolerating well, encourage aggressive pulmonary toileting  - Previously on high-intensity heparin gtt, held in the setting of acute drop in Hgb and concern for bleeding   -per primary team will place on set rate heparin drip 500 units today     Cardiovascular:    # Contained AAA rupture s/p open repair   # Acute critical limb ischaemia of LLE s/p AKA  # Hx of HTN, and persistent Tachycardia  # L Psoas hematoma on CTA  - Continue to monitor hemodynamic status - Goal MAP >65, SBP <160. Has maintained MAPs consistently  - Heparin drip held 2/2 concern for bleeding; will resume set rate heparin drip per vascular surgery   - Atorvastatin 10 mg daily     GI/Nutrition:    # s/p open AAA repair  # Post-operative melena, resolved  # Rectal ischemia, concern for, resolved  # Aspiration event 10/9  # L iliacus hematoma  # Peripancreatic hematoma   - Continue current bowel regimen, rectal tube in place--will remove today and monitor tolerance.   - enteral feeds, at goal and tolerating  - Monitor electrolytes, replete potassium, magnesium and calcium as needed per protocol   - continue NPO status per SLP, will continue to assess, appreciate assistance, failed video swallow  - GI consulted for concern for possible GI bleed   -PPI bid, will decrease to daily    -no melanonic stool, no hematemesis      Renal/Fluids/Electrolytes:  # Protein calorie deficit malnutrition, risk of   # Acute kidney injury  # Oliguria  # Hemodialysis  #Hyponatremia   - HD per  nephrology, w/ tunneled dialysis line   -tolerated HD 10/25, normal prescription of MWF   -Midodrine prior to dialysis, remained hemodynamically stable    -removed 2L, wt 72.9kg  - Strict intake and output  - Monitor electrolytes and replace, high protocol for Mag, Phos and K    Endocrine:  # Stress hyperglycaemia  - High SSI for glucose control, -172  - q6h BG checks     Infectious disease:   # oropharyngeal candidiasis  # Likely aspiration event during ADDIS  # Fungal growth on AKA biopsy  - Cultures sent for fever, and uptrending WC, follow   -10/25 BC NGTD   - all Abx stopped per ID recommendations, will monitor fever curve, leukocytosis    -remains afebrile   -Leukocytosis improved   - Infectious disease consulted, appreciate recommendations    Hematology:    # Acute blood loss anemia  # concern for bleeding, with multiple abdominal (periaortic, filemon-psoas, and stump)  - Received 2 U PRBCs for low Hgb on 10/25, hgb has remained stable   - Interventional radiology consulted for possible active bleeding, appreciate recommendations, CTA with no active extrav therefore no intervention needed at this time.   - monitor iliopsoas hematoma and left stump hematoma, appreciate Vasculars assessment/plan     MSK:  # Weakness and deconditioning of critical illness  # Left lower limb ischemia s/p AKA  # Stump hematoma in LLE  - Continue PT/OT as tolerated  - Continue wound vac placed by vascular surgery for drainage    Skin:  #Likely Spongotic Dermatitis vs. DRESS, concern of rising eosinophils on differential  - Dermatology following, appreciate recs, concern for DRESS 2/2 cefepime   - Continuing Triamcinolone and Hydrocortisone PRN   - Trend LFTs regularly      General Cares/Prophylaxis:    DVT Prophylaxis: Pneumatic Compression Devices; heparin set rate to resume today   GI Prophylaxis: PPI  Restraints: Restraints for medical healing needed: YES     Lines/ tubes/ drains:  -R tunneled HD line  -NDT  -NGT--remove today    -PIVs in RUE    Disposition:  - Surgical ICU--transfer out of the critical care unit? Defer to primary team for disposition      Patient seen, findings and plan discussed with surgical ICU staff, Dr. Motley.    Shellie Juarez CNP  Surgical ICU  Clinically Significant Risk Factors          # Hypocalcemia: Lowest iCa = 4.2 mg/dL in last 2 days, will monitor and replace as appropriate     # Hypoalbuminemia: Lowest albumin = 1.5 g/dL at 9/28/2023  7:53 AM, will monitor as appropriate  # Coagulation Defect: INR = 1.24 (Ref range: 0.85 - 1.15) and/or PTT = 33 Seconds (Ref range: 22 - 38 Seconds), will monitor for bleeding  # Thrombocytopenia: Lowest platelets = 128 in last 2 days, will monitor for bleeding   # Hypertension: Noted on problem list         # Severe Malnutrition: based on nutrition assessment              ====================================  INTERVAL HISTORY:   Evaluated while resting in bed. Oriented to person, interacts during exam, follows commands and equal strength x4.     ROS negative other than previously mentioned.       OBJECTIVE:   1. VITAL SIGNS:   Temp:  [97.5  F (36.4  C)-98.9  F (37.2  C)] 98.2  F (36.8  C)  Pulse:  [107-122] 112  Resp:  [10-21] 18  BP: ()/() 102/84  SpO2:  [91 %-100 %] 95 %  Resp: 18      2. INTAKE/ OUTPUT:   I/O last 3 completed shifts:  In: 2386.67 [I.V.:220; NG/GT:430]  Out: 2315 [Urine:100; Drains:115; Other:2000; Stool:100]    3. PHYSICAL EXAMINATION:  General: Comfortable on RA. Speech is soft and muffled.   HEENT: normocephalic,atraumatic  Neuro: opens eyes to command, moves all extremities equally. Oriented to self.  Pulm/Resp: Equal chest rise, no respiratory distress  CV: RRR, Murmur noted   Abdomen: Abdomen mildly distended. Midline laparotomy incision. Dressing in place, CDI. abdominal binder in place.  Gu: decreased scrotal swelling.  MSK/Extremities: L AKA wrapped and dressed with wound-vac in place, draining serosanguinous output.      4.  INVESTIGATIONS:   Arterial Blood Gases   No lab results found in last 7 days.    Complete Blood Count   Recent Labs   Lab 10/26/23  0113 10/25/23  1814 10/25/23  1112 10/25/23  0439 10/24/23  1305 10/24/23  0756 10/24/23  0651 10/23/23  0719 10/23/23  0555   WBC  --   --   --  15.7*  --  12.3* 12.4*  --  12.6*   HGB 8.2* 8.9* 8.6* 5.8*   < > 5.9* 5.8*   < > 6.8*   PLT  --   --   --  171  --  144* 142*  --  185    < > = values in this interval not displayed.     Basic Metabolic Panel  Recent Labs   Lab 10/26/23  0423 10/26/23  0031 10/25/23  1955 10/25/23  1505 10/25/23  1144 10/25/23  1112 10/25/23  0734 10/25/23  0439 10/24/23  1210 10/24/23  0910 10/24/23  0651 10/23/23  1143 10/23/23  0555   NA  --   --   --   --   --  133*  --  135  --   --  134*  --  135   POTASSIUM  --   --   --   --   --  5.1  --  5.1  --   --  4.8  --  4.4   CHLORIDE  --   --   --   --   --  96*  --  95*  --   --  97*  --  90*   CO2  --   --   --   --   --  25  --  24  --   --  25  --  27   BUN  --   --   --   --   --  88.6*  --  86.8*  --   --  55.9*  --  120.0*   CR  --   --   --   --   --  4.59*  --  4.35*  --  3.27* 3.13*  --  5.55*   * 137* 125* 142*   < > 149*   < > 141*   < >  --  124*   < > 153*    < > = values in this interval not displayed.     Liver Function Tests  Recent Labs   Lab 10/25/23  1040 10/25/23  0834 10/25/23  0439 10/24/23  0651 10/23/23  0555 10/22/23  0422   AST  --   --  37 28 25 30   ALT  --   --  23 23 25 31   ALKPHOS  --   --  84 81 99 110   BILITOTAL  --   --  0.5 0.5 0.5 0.4   ALBUMIN  --   --  3.1* 3.0* 3.0* 3.1*   INR 1.24* 1.24*  --   --   --   --      Pancreatic Enzymes  No lab results found in last 7 days.    Coagulation Profile  Recent Labs   Lab 10/25/23  1040 10/25/23  0834   INR 1.24* 1.24*   PTT 33  --            5. RADIOLOGY:   Recent Results (from the past 24 hour(s))   CTA Chest Abdomen Pelvis w Contrast   Result Value    Radiologist flags Above-the-knee amputation stump hematoma, not  (Urgent)    Narrative    Exam: Computed tomographic angiography of the chest, abdomen, pelvis,  and bilateral lower extremities with contrast dated 10/25/2023    Clinical information: Triple phase CTA - noncon, arterial, and venous  phases, would like down to the knees please if able.    Technique: Axial images obtained of the abdomen, pelvis, and lower  extremities through the feet obtained following the injection of  contrast media in the arterial phase. Source images reviewed as well  as 3D and multi-planar reconstructions.    Contrast: 90 mL Isovue-370    DLP: 1490 mGy*cm    Comparison: 10/24/2023.    Findings:      Aorta:  Stable postsurgical changes open repair of ruptured AAA. The  aneurysmal sac measures up to 4.5 cm, similar to prior. No definitive  contrast enhancement in the excluded aneurysmal sac. Large  intraperitoneal hematoma predominantly in the lesser sac, large  intramuscular hematoma in the left iliopsoas muscle are similar to  prior. Large hyperdense fluid collection near the stump of recent  above-the-knee amputation with scattered air foci measuring 15 x 13 x  8 cm. No active contrast extravasation.    No aneurysmal dilation of the thoracic aorta. Mild aneurysmal dilation  of the common trunk of the right brachiocephalic and left common  carotid arteries. Mild noncalcified plaque at origin of the right  brachiocephalic artery, similar to prior. The origins of the left  common carotid and left clavian arteries are unremarkable.    The proximal pulmonary vasculature is unremarkable. No central PE.    The celiac trunk and the superior mesenteric artery are patent. Early  takeoff of the left hepatic artery.  The inferior mesenteric artery origin is occluded with reconstituted  flow distally. Single right renal artery is patent. 3 left renal  arteries. Similar appearance of severe stenosis at the origin of the  most inferior left renal artery.    Aneurysmal dilation of the right greater than left  common iliac and  common femoral arteries are similar to prior. Bilateral coronal  mortis.    Right pelvis and lower extremity:    Common iliac artery: Patent  External iliac artery: Less than 50% stenosis  Common femoral artery: Patent   Profunda femoral artery: Patent  Superficial femoral artery: Less than 50% stenosis  Popliteal artery: 50% stenosis behind the knee  Tibioperoneal trunk: Patent    Left pelvis and lower extremity:    Common iliac artery: 65% stenosis near the origin  External iliac artery: Less than 50% stenosis   Common femoral artery: 50% stenosis near the origin   Profunda femoral artery: 50% stenosis near its origin with mild  poststenotic dilation  Superficial femoral artery: Patent    Chest and abdomen:    Similar size of left greater than right pleural effusions with  compression atelectasis. The density of the pleural effusion has  increased compared to scan from yesterday. No active contrast  extravasation. The pleural space. Mild subpleural opacities are  similar to prior, likely atelectasis. No hilar, mediastinal or  axillary lymphadenopathy.  Similar appearance of wedge-shaped renal infarcts and a right renal  cyst. Similar size of hypoenhancing lesions in the right lobe of the  liver. Increased layering hyperdensity in the gallbladder, vicarious  contrast excretion or less likely hemorrhage. Increased density of  similar size ascites.    The spleen, pancreas, adrenal glands appear normal. The stomach, small  and colon appear normal.       Impression    Impression:    1. Stable postsurgical changes of open repair of ruptured AAA. Similar  size of aneurysm sac with no definitive evidence of contrast  enhancement.  2. Stable size of large intraperitoneal and left iliopsoas muscle  hematoma. No evidence of active extravasation.  3. Stable size of bilateral pleural effusions and small ascites with  increased density compared to last scan. No evidence of active  extravasation in the pleural  space or peritoneal cavity.    [Urgent Result: Above-the-knee amputation stump hematoma, not  previously imaged]    Finding was identified on 10/25/2023 11:15 AM.     Dr. PEDROZA was contacted by Dr. Winston at 10/25/2023 11:35 AM and  verbalized understanding of the urgent finding.

## 2023-10-27 ENCOUNTER — APPOINTMENT (OUTPATIENT)
Dept: SPEECH THERAPY | Facility: CLINIC | Age: 68
DRG: 268 | End: 2023-10-27
Payer: COMMERCIAL

## 2023-10-27 LAB
ALBUMIN SERPL BCG-MCNC: 3 G/DL (ref 3.5–5.2)
ALP SERPL-CCNC: 126 U/L (ref 40–129)
ALT SERPL W P-5'-P-CCNC: 26 U/L (ref 0–70)
ANION GAP SERPL CALCULATED.3IONS-SCNC: 14 MMOL/L (ref 7–15)
AST SERPL W P-5'-P-CCNC: 38 U/L (ref 0–45)
ATRIAL RATE - MUSE: 116 BPM
BASE EXCESS BLDV CALC-SCNC: 2.7 MMOL/L (ref -7.7–1.9)
BASOPHILS # BLD AUTO: ABNORMAL 10*3/UL
BASOPHILS # BLD MANUAL: 0 10E3/UL (ref 0–0.2)
BASOPHILS NFR BLD AUTO: ABNORMAL %
BASOPHILS NFR BLD MANUAL: 0 %
BILIRUB DIRECT SERPL-MCNC: 0.21 MG/DL (ref 0–0.3)
BILIRUB SERPL-MCNC: 0.4 MG/DL
BUN SERPL-MCNC: 76.8 MG/DL (ref 8–23)
CA-I BLD-MCNC: 4.3 MG/DL (ref 4.4–5.2)
CALCIUM SERPL-MCNC: 8.5 MG/DL (ref 8.8–10.2)
CHLORIDE SERPL-SCNC: 97 MMOL/L (ref 98–107)
CREAT SERPL-MCNC: 3.82 MG/DL (ref 0.67–1.17)
DEPRECATED HCO3 PLAS-SCNC: 23 MMOL/L (ref 22–29)
DIASTOLIC BLOOD PRESSURE - MUSE: NORMAL MMHG
EGFRCR SERPLBLD CKD-EPI 2021: 16 ML/MIN/1.73M2
EOSINOPHIL # BLD AUTO: ABNORMAL 10*3/UL
EOSINOPHIL # BLD MANUAL: 3 10E3/UL (ref 0–0.7)
EOSINOPHIL NFR BLD AUTO: ABNORMAL %
EOSINOPHIL NFR BLD MANUAL: 26 %
ERYTHROCYTE [DISTWIDTH] IN BLOOD BY AUTOMATED COUNT: 16.4 % (ref 10–15)
GLUCOSE BLDC GLUCOMTR-MCNC: 112 MG/DL (ref 70–99)
GLUCOSE BLDC GLUCOMTR-MCNC: 115 MG/DL (ref 70–99)
GLUCOSE BLDC GLUCOMTR-MCNC: 134 MG/DL (ref 70–99)
GLUCOSE BLDC GLUCOMTR-MCNC: 138 MG/DL (ref 70–99)
GLUCOSE SERPL-MCNC: 126 MG/DL (ref 70–99)
HCO3 BLDV-SCNC: 26 MMOL/L (ref 21–28)
HCT VFR BLD AUTO: 26.1 % (ref 40–53)
HGB BLD-MCNC: 7.8 G/DL (ref 13.3–17.7)
HGB BLD-MCNC: 8.1 G/DL (ref 13.3–17.7)
HGB BLD-MCNC: 8.4 G/DL (ref 13.3–17.7)
HGB BLD-MCNC: 9.2 G/DL (ref 13.3–17.7)
IMM GRANULOCYTES # BLD: ABNORMAL 10*3/UL
IMM GRANULOCYTES NFR BLD: ABNORMAL %
INTERPRETATION ECG - MUSE: NORMAL
LACTATE SERPL-SCNC: 1.9 MMOL/L (ref 0.7–2)
LYMPHOCYTES # BLD AUTO: ABNORMAL 10*3/UL
LYMPHOCYTES # BLD MANUAL: 0.3 10E3/UL (ref 0.8–5.3)
LYMPHOCYTES NFR BLD AUTO: ABNORMAL %
LYMPHOCYTES NFR BLD MANUAL: 3 %
MAGNESIUM SERPL-MCNC: 1.9 MG/DL (ref 1.7–2.3)
MCH RBC QN AUTO: 30.1 PG (ref 26.5–33)
MCHC RBC AUTO-ENTMCNC: 32.2 G/DL (ref 31.5–36.5)
MCV RBC AUTO: 94 FL (ref 78–100)
MONOCYTES # BLD AUTO: ABNORMAL 10*3/UL
MONOCYTES # BLD MANUAL: 0.1 10E3/UL (ref 0–1.3)
MONOCYTES NFR BLD AUTO: ABNORMAL %
MONOCYTES NFR BLD MANUAL: 1 %
NEUTROPHILS # BLD AUTO: ABNORMAL 10*3/UL
NEUTROPHILS # BLD MANUAL: 8.1 10E3/UL (ref 1.6–8.3)
NEUTROPHILS NFR BLD AUTO: ABNORMAL %
NEUTROPHILS NFR BLD MANUAL: 70 %
NRBC # BLD AUTO: 0 10E3/UL
NRBC BLD AUTO-RTO: 0 /100
O2/TOTAL GAS SETTING VFR VENT: 0 %
OXYHGB MFR BLDV: 84 % (ref 70–75)
P AXIS - MUSE: 32 DEGREES
PCO2 BLDV: 35 MM HG (ref 40–50)
PH BLDV: 7.48 [PH] (ref 7.32–7.43)
PHOSPHATE SERPL-MCNC: 5.2 MG/DL (ref 2.5–4.5)
PLAT MORPH BLD: ABNORMAL
PLATELET # BLD AUTO: 124 10E3/UL (ref 150–450)
PO2 BLDV: 49 MM HG (ref 25–47)
POTASSIUM SERPL-SCNC: 4 MMOL/L (ref 3.4–5.3)
PR INTERVAL - MUSE: 136 MS
PROT SERPL-MCNC: 5.8 G/DL (ref 6.4–8.3)
QRS DURATION - MUSE: 72 MS
QT - MUSE: 340 MS
QTC - MUSE: 472 MS
R AXIS - MUSE: -4 DEGREES
RBC # BLD AUTO: 2.79 10E6/UL (ref 4.4–5.9)
RBC MORPH BLD: ABNORMAL
SODIUM SERPL-SCNC: 134 MMOL/L (ref 135–145)
SYSTOLIC BLOOD PRESSURE - MUSE: NORMAL MMHG
T AXIS - MUSE: 29 DEGREES
UFH PPP CHRO-ACNC: <0.1 IU/ML
VENTRICULAR RATE- MUSE: 116 BPM
WBC # BLD AUTO: 11.5 10E3/UL (ref 4–11)

## 2023-10-27 PROCEDURE — 99024 POSTOP FOLLOW-UP VISIT: CPT | Performed by: NURSE PRACTITIONER

## 2023-10-27 PROCEDURE — 99232 SBSQ HOSP IP/OBS MODERATE 35: CPT | Performed by: PHYSICIAN ASSISTANT

## 2023-10-27 PROCEDURE — 86706 HEP B SURFACE ANTIBODY: CPT | Performed by: CLINICAL NURSE SPECIALIST

## 2023-10-27 PROCEDURE — 99232 SBSQ HOSP IP/OBS MODERATE 35: CPT | Mod: FS | Performed by: STUDENT IN AN ORGANIZED HEALTH CARE EDUCATION/TRAINING PROGRAM

## 2023-10-27 PROCEDURE — C9113 INJ PANTOPRAZOLE SODIUM, VIA: HCPCS | Mod: JZ

## 2023-10-27 PROCEDURE — 92526 ORAL FUNCTION THERAPY: CPT | Mod: GN

## 2023-10-27 PROCEDURE — 85014 HEMATOCRIT: CPT

## 2023-10-27 PROCEDURE — 85018 HEMOGLOBIN: CPT | Performed by: NURSE PRACTITIONER

## 2023-10-27 PROCEDURE — 36415 COLL VENOUS BLD VENIPUNCTURE: CPT | Performed by: NURSE PRACTITIONER

## 2023-10-27 PROCEDURE — 120N000002 HC R&B MED SURG/OB UMMC

## 2023-10-27 PROCEDURE — 999N000128 HC STATISTIC PERIPHERAL IV START W/O US GUIDANCE

## 2023-10-27 PROCEDURE — 80053 COMPREHEN METABOLIC PANEL: CPT

## 2023-10-27 PROCEDURE — 250N000013 HC RX MED GY IP 250 OP 250 PS 637: Performed by: ANESTHESIOLOGY

## 2023-10-27 PROCEDURE — 87449 NOS EACH ORGANISM AG IA: CPT | Performed by: PHYSICIAN ASSISTANT

## 2023-10-27 PROCEDURE — 82805 BLOOD GASES W/O2 SATURATION: CPT | Performed by: STUDENT IN AN ORGANIZED HEALTH CARE EDUCATION/TRAINING PROGRAM

## 2023-10-27 PROCEDURE — 82248 BILIRUBIN DIRECT: CPT

## 2023-10-27 PROCEDURE — 36415 COLL VENOUS BLD VENIPUNCTURE: CPT | Performed by: STUDENT IN AN ORGANIZED HEALTH CARE EDUCATION/TRAINING PROGRAM

## 2023-10-27 PROCEDURE — 84100 ASSAY OF PHOSPHORUS: CPT | Performed by: PHYSICIAN ASSISTANT

## 2023-10-27 PROCEDURE — 87340 HEPATITIS B SURFACE AG IA: CPT | Performed by: CLINICAL NURSE SPECIALIST

## 2023-10-27 PROCEDURE — 250N000013 HC RX MED GY IP 250 OP 250 PS 637: Performed by: NURSE PRACTITIONER

## 2023-10-27 PROCEDURE — 250N000013 HC RX MED GY IP 250 OP 250 PS 637

## 2023-10-27 PROCEDURE — 82330 ASSAY OF CALCIUM: CPT | Performed by: PHYSICIAN ASSISTANT

## 2023-10-27 PROCEDURE — 83605 ASSAY OF LACTIC ACID: CPT | Performed by: PHYSICIAN ASSISTANT

## 2023-10-27 PROCEDURE — 85007 BL SMEAR W/DIFF WBC COUNT: CPT

## 2023-10-27 PROCEDURE — 250N000011 HC RX IP 250 OP 636: Performed by: INTERNAL MEDICINE

## 2023-10-27 PROCEDURE — 83735 ASSAY OF MAGNESIUM: CPT | Performed by: PHYSICIAN ASSISTANT

## 2023-10-27 PROCEDURE — 85520 HEPARIN ASSAY: CPT | Performed by: NURSE PRACTITIONER

## 2023-10-27 PROCEDURE — 250N000013 HC RX MED GY IP 250 OP 250 PS 637: Performed by: SURGERY

## 2023-10-27 PROCEDURE — 258N000003 HC RX IP 258 OP 636: Performed by: CLINICAL NURSE SPECIALIST

## 2023-10-27 PROCEDURE — 250N000011 HC RX IP 250 OP 636: Mod: JZ

## 2023-10-27 RX ORDER — MAGNESIUM SULFATE HEPTAHYDRATE 40 MG/ML
2 INJECTION, SOLUTION INTRAVENOUS ONCE
Status: COMPLETED | OUTPATIENT
Start: 2023-10-27 | End: 2023-10-27

## 2023-10-27 RX ADMIN — QUETIAPINE FUMARATE 25 MG: 25 TABLET ORAL at 20:38

## 2023-10-27 RX ADMIN — ACETAMINOPHEN 975 MG: 325 TABLET, FILM COATED ORAL at 05:14

## 2023-10-27 RX ADMIN — Medication 1 MG: at 18:15

## 2023-10-27 RX ADMIN — SODIUM CHLORIDE 300 ML: 9 INJECTION, SOLUTION INTRAVENOUS at 12:58

## 2023-10-27 RX ADMIN — QUETIAPINE FUMARATE 100 MG: 100 TABLET ORAL at 23:26

## 2023-10-27 RX ADMIN — ATORVASTATIN CALCIUM 10 MG: 10 TABLET, FILM COATED ORAL at 20:37

## 2023-10-27 RX ADMIN — ACETAMINOPHEN 975 MG: 325 TABLET, FILM COATED ORAL at 18:15

## 2023-10-27 RX ADMIN — PANTOPRAZOLE SODIUM 40 MG: 40 INJECTION, POWDER, FOR SOLUTION INTRAVENOUS at 09:33

## 2023-10-27 RX ADMIN — HYDROCORTISONE: 25 CREAM TOPICAL at 09:33

## 2023-10-27 RX ADMIN — ACETAMINOPHEN 975 MG: 325 TABLET, FILM COATED ORAL at 11:41

## 2023-10-27 RX ADMIN — Medication 15 ML: at 09:32

## 2023-10-27 RX ADMIN — SODIUM CHLORIDE 250 ML: 9 INJECTION, SOLUTION INTRAVENOUS at 12:57

## 2023-10-27 RX ADMIN — ACETAMINOPHEN 975 MG: 325 TABLET, FILM COATED ORAL at 23:25

## 2023-10-27 RX ADMIN — HYDROCORTISONE: 25 CREAM TOPICAL at 20:37

## 2023-10-27 RX ADMIN — MAGNESIUM SULFATE IN WATER 2 G: 40 INJECTION, SOLUTION INTRAVENOUS at 09:32

## 2023-10-27 RX ADMIN — MIDODRINE HYDROCHLORIDE 20 MG: 5 TABLET ORAL at 13:35

## 2023-10-27 RX ADMIN — Medication: at 12:58

## 2023-10-27 ASSESSMENT — ACTIVITIES OF DAILY LIVING (ADL)
ADLS_ACUITY_SCORE: 55
ADLS_ACUITY_SCORE: 51
ADLS_ACUITY_SCORE: 55
ADLS_ACUITY_SCORE: 51
ADLS_ACUITY_SCORE: 55
ADLS_ACUITY_SCORE: 55

## 2023-10-27 NOTE — PROGRESS NOTES
Nephrology Progress Note  10/27/2023       Alfredo Burnham is a 68 yom with complex hx of TIA, HTN, blindness who presented to [facility] 9/24 with severe abdominal pain radiating to flank and back, CT revealed AAA with a contained rupture.  Taken to OR for aortobiiliac bypass and resection of ruptured pararenal aneurysm.   Post op course complicated by hypotension requiring pressors and metabolic acidosis.  Baseline Cr 1.0 on presentation but on the rise to >5 at time of renal consult for MAYA management and possible RRT.       Interval History :   Mr Burnham had day off HD yesterday, no major issue in chemistries but is not showing any sign of recovery so will run today to keep on MWF schedule, pulling 1-2L as BP's are stable.  Next planned run Monday 10/30 unless issues arise.   Has likely gotten enough iron with recent PRBC's to be loaded, will consider TE with next run.      Assessment & Recommendations:   MAYA-Baseline Cr 1.0, ordered UA.  CT showed benign cyst but otherwise normal kidneys.  Cr on the rise since surgery, did receive contrast for CTA.  Urine microscopy showed granular casts suggesting ATN which will recover with time and stabilizing hemodynamics.  Started on CRRT 9/30 for volume and clearance with minimal UOP and rising Cr.  Transitioned to iHD on 10/5.      -HD today on MWF schedule, next run likely 10/30.      -Appreciate IR placing tunneled line RIJ 10/18.                  -Dialysis consent signed and scanned into media tab.      -Infarcts seen on CT 10/13 lowers chances of recovery but is making intermittent UOP.          Volume-Feeling well from pulm standpoint, has minimal edema on exam.  Intake was ~1.5L yesterday so will try to pull 1-2L to match/challenge volume status.      Electrolytes-K 4.0, bicarb 23, Na 134.       BMD-Ca 8.5, Mg 1.9.  Phos 5.2, no issues.        Anemia-Hgb 8.4 and stable the past ~24h, continuing to assess for bleeding.   ~14 on presentation, acute management per  "team.       Nutrition-On Osmolite TF.       Time spent: 40 minutes on this date of encounter for chart review, physical exam, medical decision making and co-ordination of care.       Seen and discussed with Dr Ibarra    Recommendations were communicated to primary team via verbal communication.        ALTAGRACIA Jiménez CNS  Clinical Nurse Specialist  [phone]    Review of Systems:   I reviewed the following systems:  ROS not done due to lethargy    Physical Exam:   I/O last 3 completed shifts:  In: 1614.42 [I.V.:194.42; NG/GT:340]  Out: 255 [Drains:5; Stool:250]   BP (!) 143/71 (BP Location: Right arm)   Pulse 110   Temp 97.8  F (36.6  C) (Oral)   Resp 16   Ht 1.727 m (5' 8\")   Wt 72.9 kg (160 lb 11.5 oz)   SpO2 95%   BMI 24.44 kg/m       GENERAL APPEARANCE: Extubated, lethargic, in no distress.   EYES: No scleral icterus  Pulmonary: On vent 40%/8 of PEEP  CV: Regular rhythm, normal rate   - Edema +1-2 generalized, + scrotal edema.    GI: distended, nontender  MS: no evidence of inflammation in joints, no muscle tenderness  : No Lu  SKIN: no rash, warm, dry  NEURO: No focal deficits.         Labs:   All labs reviewed by me  Electrolytes/Renal -   Recent Labs   Lab Test 10/27/23  0548 10/27/23  0320 10/26/23  2345 10/26/23  0736 10/26/23  0700 10/25/23  1144 10/25/23  1112 10/25/23  0734 10/25/23  0439   *  --   --   --  134*  --  133*  --  135   POTASSIUM 4.0  --   --   --  4.2  --  5.1  --  5.1   CHLORIDE 97*  --   --   --  96*  --  96*  --  95*   CO2 23  --   --   --  24  --  25  --  24   BUN 76.8*  --   --   --  51.5*  --  88.6*  --  86.8*   CR 3.82*  --   --   --  2.94*  --  4.59*  --  4.35*   * 112* 124*   < > 120*   < > 149*   < > 141*   OMAR 8.5*  --   --   --  8.4*  --  8.7*  --  8.9   MAG 1.9  --   --   --  1.8  --   --   --  2.3   PHOS 5.2*  --   --   --  4.5  --   --   --  5.6*    < > = values in this interval not displayed.       CBC -   Recent Labs   Lab Test 10/27/23  0548 " 10/27/23  0035 10/26/23  1819 10/26/23  1051 10/26/23  0700   WBC 11.5*  --   --  15.7* 13.2*   HGB 8.4* 8.1* 8.6* 8.4* 8.4*   *  --   --  125* 128*       LFTs -   Recent Labs   Lab Test 10/27/23  0548 10/26/23  0700 10/25/23  0439   ALKPHOS 126 111 84   BILITOTAL 0.4 0.4 0.5   ALT 26 20 23   AST 38 52* 37   PROTTOTAL 5.8* 5.9* 6.2*   ALBUMIN 3.0* 2.8* 3.1*       Iron Panel - No lab results found.        Current Medications:   acetaminophen  975 mg Oral or Feeding Tube Q6H    atorvastatin  10 mg Oral or Feeding Tube QPM    hydrocortisone   Topical BID    insulin aspart  1-12 Units Subcutaneous Q4H    melatonin  1 mg Oral or Feeding Tube QPM    multivitamins w/minerals  15 mL Per Feeding Tube Daily    - MEDICATION INSTRUCTIONS -   Does not apply Once    pantoprazole  40 mg Intravenous Q24H    protein modular  1 packet Per Feeding Tube Daily    QUEtiapine  100 mg Oral or Feeding Tube At Bedtime    sodium chloride (PF)  10 mL Intravenous Once    sodium chloride (PF)  9 mL Intracatheter During Dialysis/CRRT (from stock)    sodium chloride (PF)  9 mL Intracatheter During Dialysis/CRRT (from stock)    sodium chloride 0.9%  250 mL Intravenous Once in dialysis/CRRT    sodium chloride 0.9%  300 mL Hemodialysis Machine Once      dextrose      dextrose Stopped (10/22/23 2106)    heparin 500 Units/hr (10/27/23 0700)

## 2023-10-27 NOTE — PLAN OF CARE
ICU End of Shift Summary. See flowsheets for vital signs and detailed assessment.    Changes this shift: Patient had an uneventful night, intermittent confusion, pulling on tele wires, re-directable,vital signs stable, place patient on 1 liter nasal canula due to desaturation of 83%, prn oxycodone given for pain, 1 large incontinence episode of bowel and bladder, hemoglobin remains stable.    Plan: Transfer to floor when bed is available.    Goal Outcome Evaluation:           Overall Patient Progress: no changeOverall Patient Progress: no change

## 2023-10-27 NOTE — PROGRESS NOTES
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      Discussion:  Alfredo Burnham is a 68 year old male with history of HTN, and blindness due to macular degeneration who was admitted on 9/24/23 with abdominal pain, found to have ruptured pararenal aortic aneurysm. 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      Fever  Tmax 102.1 overnight on 10/24, which resolved rapidly; associated tachycardia, hypertensive on AM of 10/25. No localizing symptoms. Bcx NGTD. CT with multiple hematomas associated with Hgb drop from 8.1 to 5.8,  required multiple transfusions over a 72 hour period. Also with several embolic events during hospitalization. Unable to assess LLE stump site at this time, CT of area with hematoma. Hgb now stabilized, remains afebrile. At this time favor noninfectious cause of isolated fever, monitor off of antimicrobials.     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 Pathology with \"Ulcerated skin with fungal colonies and underlying ischemic dermis and subcutaneous tissue.\" Per report, superficial rather than deeper. Most likely would be yeast. No chest findings to suggest fungal infection. Does have some risk factors for endemic fungal inoculation (woodworking, moved brush [place], [place] with Blastomyces) so will check endemic fungal antigens- CrAg neg, Histo/Blasto antigens pending. As this tissue was amputated, no need for treatment at this time.     Morbilliform rash  Diffuse rash onset ~10/13, evaluated by dermatology, felt to be possible drug rash related to cefepime. Rash had improved, though worsening on AM of 10/25 after cefepime given overnight. No evidence of DIHS, previous transaminitis resolved. Will monitor as he does have return of rash and rise in eosinophils, which were previously improving 2.4-->1.9-->2.0-->2.4-->3 with LFT WNL.       Attestation:  I have reviewed today's vital signs, medications, labs and imaging.  Amarilys Zarco PA-C, Pager # [phone]         45 MINUTES SPENT BY ME on the date of service doing chart review, history, exam, documentation & further activities per the note.             Interval History:       Afebrile, VSS. Ongoing rash. Not pruritic. Does have some itching over lower portion of incision. No fevers, rigors, chills, nausea, vomiting. Reviewed blood cultures NGTD and pending fungal work up with patient and his wife.          Current Antimicrobials   Current:  - none    Prior:  - micafungin (10/25)  - Clindamycin (10/17, 10/20)  - Levofloxacin (10/16-10/20)  - Cefepime (9/25-9/29, 10/1-10/3, 10/14-10/15, 10/25)  - Flagyl (9/25-10/3, 10/25)  - Cefazolin (filemon-op: 9/24, 9/25, 9/26, 9/29)         Physical Exam:   Ranges for vital signs:  Temp:  [96.8  F (36  C)-98.1  F (36.7  C)] 96.8  F (36  C)  Pulse:  [105-114] 106  Resp:  [15-22] " 20  BP: (114-146)/(70-85) 141/70  Cuff Mean (mmHg):  [97] 97  SpO2:  [94 %-96 %] 94 %    Intake/Output Summary (Last 24 hours) at 10/26/2023 1415  Last data filed at 10/26/2023 1400  Gross per 24 hour   Intake 1629.42 ml   Output 2460 ml   Net -830.58 ml     Exam:  GENERAL:  awake, alert, interactive. Soft voice.   ENT:  Head is normocephalic, atraumatic. NGT in place. Oropharynx is moist  EYES:  Eyes have anicteric sclerae, noninjected conjunctivae  ABDOMEN:  Soft, dressings in place over surgical incision, no visible drainage.  EXT: RLE nonedematous. LLE with ace wrap and vac in place.  SKIN:  Blotchy blanching, confluent, macular rash- stable from previous on abdomen, back, arms, thighs. Line is in place without any surrounding erythema. Back of right thigh with dry flaking/peeling skin.      RLE 10/27/2023       Abdomen 10/27/2023      RUE 10/27/2023      LUE 10/27/2023          Laboratory Data:   Reviewed.  Pertinent for:    Culture data:  Microbiology:  Culture   Date Value Ref Range Status   10/25/2023 No growth after 2 days  Preliminary   10/25/2023 No growth after 2 days  Preliminary   10/17/2023 2+ Staphylococcus epidermidis (A)  Final     Comment:     Susceptibilities not routinely done, refer to antibiogram to view typical susceptibility profiles   10/17/2023 1+ Candida albicans (A)  Final     Comment:     Susceptibilities not routinely done, refer to antibiogram to view typical susceptibility profiles   10/14/2023 No Growth  Final   10/14/2023 No Growth  Final   10/09/2023 No Growth  Final   10/09/2023 No Growth  Final   10/01/2023 2+ Candida albicans (A)  Final     Comment:     Susceptibilities not routinely done, refer to antibiogram to view typical susceptibility profiles   10/01/2023 2+ Debbi krusei (A)  Final     Comment:     Susceptibilities not routinely done, refer to antibiogram to view typical susceptibility profiles   10/01/2023 1+ Normal hakan  Final   10/01/2023 No Growth  Final  "  10/01/2023 No Growth  Final   09/28/2023 3+ Candida albicans (A)  Final     Comment:     Susceptibilities not routinely done, refer to antibiogram to view typical susceptibility profiles   09/28/2023 1+ Aspergillus fumigatus complex (A)  Final   09/28/2023 3+ Normal hakan  Final       Inflammatory Markers    No lab results found.    Invalid input(s): \"RATE\", \"AUTO\", \"ESR\", \"WESR\"    Hematology Studies    Recent Labs   Lab Test 10/27/23  0548 10/27/23  0035 10/26/23  1819 10/26/23  1051 10/26/23  0700 10/25/23  1112 10/25/23  0439   WBC 11.5*  --   --  15.7* 13.2*  --  15.7*   HGB 8.4* 8.1* 8.6* 8.4* 8.4*   < > 5.8*   MCV 94  --   --  90 96  --  94   *  --   --  125* 128*  --  171    < > = values in this interval not displayed.     Recent Labs   Lab Test 10/27/23  0548 10/26/23  0700 10/24/23  0651 10/23/23  0555   ANEU 8.1 9.4* 9.5* 9.3*   AEOS 3.0* 2.4* 1.9* 2.4*       Metabolic Studies     Recent Labs   Lab Test 10/27/23  0548 10/26/23  0700 10/25/23  1112 10/25/23  0439 09/29/23  1211 09/29/23  1210 09/25/23  0630 09/25/23  0344   * 134* 133* 135   < > 140   < > 139   POTASSIUM 4.0 4.2 5.1 5.1   < > 4.0   < > 4.3   CHLORIDE 97* 96* 96* 95*   < > 109*   < > 101   CO2 23 24 25 24   < > 18*   < > 14*   BUN 76.8* 51.5* 88.6* 86.8*   < > 57.0*   < > 14.1   CR 3.82* 2.94* 4.59* 4.35*   < > 5.77*   < > 1.08   GFRESTIMATED 16* 22* 13* 14*   < > 10*   < > 74   ANIONGAP 14 14 12 16*   < > 13   < > 24*   A1C  --   --   --   --   --   --   --  5.7*   LACT 1.9 2.6*  --  2.7*   < >  --    < > 11.2*   CKT  --   --   --   --   --  1,503*  --   --     < > = values in this interval not displayed.       Hepatic Studies    Recent Labs   Lab Test 10/27/23  0548 10/26/23  0700 10/25/23  0439   BILITOTAL 0.4 0.4 0.5   ALKPHOS 126 111 84   ALBUMIN 3.0* 2.8* 3.1*   AST 38 52* 37   ALT 26 20 23              Imaging:     CTA Chest/abd/Pelvis w/contrast (10/25/23)  Impression:  1. Postsurgical changes of open repair of " ruptured AAA.   2. Unchanged size of large retroperitoneal and left iliopsoas muscle  hematoma. No evidence of active extravasation.  3. Unchanged size of bilateral pleural effusions and small ascites  with increased density compared to last scan. No evidence of active  extravasation in the pleural space or peritoneal cavity.  4. The left lower extremity above-the-knee amputation stump had not  been previously imaged.  Large hematoma with enhancement immediately  adjacent the hematoma, without active contrast extravasation  visualized.    CTA Head Neck w/ contrast (10/25/2023)  IMPRESSION:   HEAD CT:  1.  No acute intracranial abnormality.  2.  No hemorrhage or mass effect  3.  Scattered infarcts seen on the previous MRI are much better appreciated on that study. No definite new infarct by CT.     HEAD CTA:   1.  No high-grade stenosis, branch occlusion, or aneurysm.     NECK CTA:  1.  No high-grade stenosis or dissection.     CTA Chest/Abd/Pelvis (10/24/23)  IMPRESSION:  1. New intramuscular mixed density fluid collection in the left psoas  muscle, most likely a hematoma. No active extravasation.   2. Postsurgical changes of open repair of ruptured AAA. Stable size of  aneurysm sac with no definitive evidence of contrast enhancement in  the excluded aneurysm sac. Similar size of large intraperitoneal and  left iliacus muscle hematomas.  3. Stable size of left greater than right bilateral pleural effusions  with compressive atelectasis.     CTT Chest/Abd/Pelvis w/contrast (10/13/23)  IMPRESSION:   1.  Enlarged heterogeneously hypoattenuating left illiacus muscle  measuring up to 6.6 cm. Findings may represent developing iliacus  hematoma. Consider CTA abdomen/pelvis to further evaluate for active  bleeding within the hematoma.  2.  Narrow caliber infrahepatic IVC with appearance of compression in  between thrombus secondary to AAA rupture and abdominal aorta;  findings may represent both hypotension and potential  compression  secondary to the surrounding hematoma.   3.  Bilateral, left greater than right, wedge-shaped hypodensities  suggesting infarcts.   4.  Edematous left colon. There appears to be mucosal enhancement. No  evidence of pneumatosis intestinalis or free air in the abdomen.  5.  Pancreas appears perfused but there is peripancreatic fluid with  hemorrhage. No pseudoaneurysm or extravasation identified.   6.  Diffuse intraperitoneal ascites and mixed attenuating hematoma,  along the right inferior renal aorta and aortic bifurcation, likely  related to recent AAA rupture and subsequent repair.  7.  Bilateral pleural effusions with associated atelectasis and  groundglass opacities, likely representing pulmonary edema.  8.  Diffuse anasarca.

## 2023-10-27 NOTE — PROGRESS NOTES
Vascular Surgery Progress Note  10/27/2023       Subjective:  Alert and oriented this morning, conducting appropriate conversation, asks appropriate questions, wondering about the plan for his discharge.  Leukocytosis slightly improved, at 11.5 this morning, hemoglobin stable at 8.4.  Continues to be hemodynamically stable.  FARIDA drain 5 mL / 24 hours.  Prevena drained 0 mL / 24 hours.    Objective:  Temp:  [97.5  F (36.4  C)-98.2  F (36.8  C)] 97.8  F (36.6  C)  Pulse:  [105-117] 110  Resp:  [11-22] 16  BP: (114-146)/(58-85) 143/71  Cuff Mean (mmHg):  [97] 97  SpO2:  [95 %-97 %] 95 %    I/O last 3 completed shifts:  In: 1594.42 [I.V.:154.42; NG/GT:335]  Out: 455 [Drains:5; Stool:450]      Gen: resting comfortably in bed in ICU, answering questions, whispering words, not in acute distress  CV: off pressors, regular HR per tele with PACs  Resp: non-labored, on room air (required 1L NC overnight, no further supplemental oxygenation needs this morning)  Abd: Abdominal incision in dressing, FARIDA with serosang output (5 ml/24h), removed 10/27/2023.  No active bleeding, abdomen soft, non-tender  Ext: RLE wwp, palpable DP pulse, LLE stump incision with general surrounding erythema, no obvious bleeding, semifirm, unchanged over the last 3 days.   Prevena LLE placed on 10/23//23 - to be replaced in 5-7 days    Labs:  Recent Labs   Lab 10/27/23  0548 10/27/23  0035 10/26/23  1819 10/26/23  1051 10/26/23  0700   WBC 11.5*  --   --  15.7* 13.2*   HGB 8.4* 8.1* 8.6* 8.4* 8.4*   *  --   --  125* 128*       Recent Labs   Lab 10/27/23  0548 10/27/23  0320 10/26/23  2345 10/26/23  0736 10/26/23  0700 10/25/23  1144 10/25/23  1112 10/25/23  0734 10/25/23  5229   *  --   --   --  134*  --  133*  --  135   POTASSIUM 4.0  --   --   --  4.2  --  5.1  --  5.1   CHLORIDE 97*  --   --   --  96*  --  96*  --  95*   CO2 23  --   --   --  24  --  25  --  24   BUN 76.8*  --   --   --  51.5*  --  88.6*  --  86.8*   CR 3.82*  --   --    --  2.94*  --  4.59*  --  4.35*   * 112* 124*   < > 120*   < > 149*   < > 141*   OMAR 8.5*  --   --   --  8.4*  --  8.7*  --  8.9   MAG 1.9  --   --   --  1.8  --   --   --  2.3   PHOS 5.2*  --   --   --  4.5  --   --   --  5.6*    < > = values in this interval not displayed.      Imaging reviewed.    Assessment/Plan:   68 year old male with PMH of HTN and previous TIA who presented with R sided abdominal pain found to have contained ruptured AAA, now s/p open AAA repair 9/24 into 9/25am with 30 min supraceliac clamp time, prolonged inter-renal clamp time, temporary abdominal closure. He did have bloody stool postop with flex sig 9/25 demonstrating ischemic mucosal changes of the rectum, repeated abdominal without evidence of transmural necrosis of the small or large intestine, or the rectum. On 9/29 he was started on CRRT given ongoing low UOP and rising Cr and failure of lasix challenge. Now on iHD. Hemodynamically continues to do well off pressors. S/p closure of abdominal fascia and subcutaneous tissue left open with wound VAC applied 10/2/23. With ischemic LLE, AKA deferred until 10/17/2023 due to leukocytosis, hypoxia requiring reintubation (10/13/23) and critical illness. ADDIS on 10/16/23 without evidence of embolic source. 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 On 10/20/23 underwent abdominal incision closure.     On 10/24/23 found to have hgb 5.8 CTA demonstrated left psoas muscle hematoma now size 6 cm x 3 cm x 3 cm, barely seen on CTA from 10/13. Lesser sac of the peritoneal cavity collection stable, hematoma in left iliac stable, no active extravasation. His heparin drip was stopped and patient required blood transfusions.     Overnight 10/24-10/25 patient more lethargic with fevers at 102.1 thought to be strokelike symptoms, stroke code called, CCTH negative for ICH or subacute CVA, CTA negative for LVO. Hemoglobin dropped again to 5.8, despite holding heparin for now 24 hours. Repeat CTA: triple phase  CTA - noncon, arterial, and venous phases, demonstrated hematoma in LLE stump, with otherwise stable left iliac hematoma and stable left psoas muscle hematoma. Patient's hgb stable for the last 48 hours.     Neuro:   - Appears intact: appreciate monitoring neuro status.  Episodes of delirium on/off.  Conducting conversation with appropriate questions this morning.  - Stroke workup negative - follow-up with neurology 4-6 weeks after discharge, ok with a/c.   - ADDIS for workup 10/16/23 demonstrated no thromboembolic source or shunt identified in cardiac chambers. Grade IV atheroma in descending aorta and aortic arch   CV:   - Off pressors, midodrine 20mg daily prn with HD runs   - Goal SBP <160, MAP >65  - Labetalol prn for SBP >160  - Continue statin  - PE+, venous duplex with nonocclusive to occlusive thrombus of the left GSV from the level of the knee to the groin and nonocclusive thrombus of the left distal femoral vein and popliteal veins, increased in extent compared to 9/30/2023.   - Heparin drip at 500 units fixed dose for, will consider advancing over the weekend if hemoglobin remains stable.  - ADDIS 10/16/23 demonstrated no thromboembolic source or shunt, has grade IV atheroma in descending aorta and aortic arch   Resp:   - On room air  GI:   - Pancreatitis with peripancreatic fluid collection on CT with question of bleeding into the collection.  - Do not expect this anterior location of pancreatic fluid to be related to surgical procedure as we were in the RP and did see inferior/posterior aspect of pancreas. Fluid collection is not surrounding aortic graft, this is reassuring. GI signed off as collection not drainable.  - TF tolerating via NJT  - Change abd dressing daily  - Failed SLP, failed video swallow eval, continue NPO  FEN:   - Electrolyte replacement prn   Renal:   - Appreciate nephrology recommendations  - Continue iHD  - Tunneled line with IR placed 10/18/2023  Heme:   - Hemoglobin stable this  morning at 8.4.  Stable over the last 48 hours.  - DVT as above, PE   - Heparin drip at 500 units fixed dose, consider advancing over the weekend if hemoglobin remains stable.  - Appreciate aggressive PT/OT ROM, patient severely deconditioned and requires extensive physical therapy.  ID:   - Fevers 102.1 on 10/25 at 4am. BCx negative for 2 days, received 1 dose of cefepime and flagyl on 10/25, now off antibiotics with slightly improving leukocytosis. Patient has allergy to cefepime (hives). Continue to monitor for fevers, leukocytosis, hemodynamic stability.  - monitor signs of pneumonia  - nystatin swish for oral candida  - monitor leukocytosis  MSK:   - L AKA 10/17/23: prevena applied 10/23/23, to stay on for 5 to 7 days  Derm:   - Has rash on abdomen, trunk, anterior bilateral thighs, erythematous, blanching, however maculopapular does not seem consistent with cellulitis. Cefepime induced.   - Pharmacy reviewed medications for possible causes of rash and felt all were relatively low risk however not 0 risk   - benadryl cream topically applied with no improvement  - Appreciate dermatology recs, likely morbilliform eruption vs. Low concern for possible DRESS   - CBC with diff daily stable   - LFTs daily    - triamcinolone 0.1% PRN  Misc:   - abd binder on at all times.    Discussed patient with Dr. Loew, vascular surgery staff    Miryam Carrasco CNP  Vascular Surgery  Pager: [phone]  [email or web address]  Send message or 10 digit call back number Securely via [employer] with the [employer] Web Console (learn more here)

## 2023-10-27 NOTE — PROGRESS NOTES
Date: 10/27/2023  Time: 0450     Data:  Pre Wt:   72.9 kg as of 10/26/2023  Desired Wt:   To be established  Post Wt:  1 kg (estimated)  Weight change: - 1 kg  Ultrafiltration - Post Run Net Total Removed (mL):  1000 ml  Vascular Access Status: CVC patent  Dialyzer Rinse:  Light  Total Blood Volume Processed: 60.9 L   Total Dialysis (Treatment) Time:   3 Hrs  Dialysate Bath: K 3, Ca 2.25  Heparin: Heparin: None     Lab:   Yes       Interventions:  Dialysis done through R CVC  Initially set UF to 2L.UF limited d/t hypotension,  accommodating priming and rinse back volumes  ,   Midodrine administered per MAR  Treatment has ended safely and  blood is rinsed back completely  Catheter lumens flushed with saline and locked with saline, catheter caps (ClearGuard ) changed post HD  Report given to PCN, sent back to his room in stable condition     Assessment:  Disoriented x 4, restless, confuse ,denies pain  CVC intact, previous dressing clean and dry                Plan:    Per Renal team        Maria Parra, JUSTINN, RN  Acute Dialysis RN  [facility] [place]

## 2023-10-27 NOTE — PLAN OF CARE
ICU End of Shift Summary: See flowsheets for vital signs and detailed assessment    Changes this shift: Alert, confused. Intermittently restless. Tachy at baseline, 110s. Room air to 1-2 lpm/nc to maintain SpO2 > 92%. Multiple loose BMs. HD run - 1 liter pulled, patient tolerated well. Continues on straight rate heparin gtt.     Plan: Continue delirium precautions. Plan to work w/ PT tomorrow.

## 2023-10-28 ENCOUNTER — APPOINTMENT (OUTPATIENT)
Dept: SPEECH THERAPY | Facility: CLINIC | Age: 68
DRG: 268 | End: 2023-10-28
Payer: COMMERCIAL

## 2023-10-28 ENCOUNTER — APPOINTMENT (OUTPATIENT)
Dept: OCCUPATIONAL THERAPY | Facility: CLINIC | Age: 68
DRG: 268 | End: 2023-10-28
Payer: COMMERCIAL

## 2023-10-28 LAB
ALBUMIN SERPL BCG-MCNC: 3.1 G/DL (ref 3.5–5.2)
ALP SERPL-CCNC: 133 U/L (ref 40–129)
ALT SERPL W P-5'-P-CCNC: 25 U/L (ref 0–70)
ANION GAP SERPL CALCULATED.3IONS-SCNC: 13 MMOL/L (ref 7–15)
AST SERPL W P-5'-P-CCNC: 34 U/L (ref 0–45)
BASE EXCESS BLDV CALC-SCNC: 5 MMOL/L (ref -7.7–1.9)
BASOPHILS # BLD AUTO: 0 10E3/UL (ref 0–0.2)
BASOPHILS NFR BLD AUTO: 0 %
BILIRUB DIRECT SERPL-MCNC: 0.22 MG/DL (ref 0–0.3)
BILIRUB SERPL-MCNC: 0.5 MG/DL
BUN SERPL-MCNC: 49.6 MG/DL (ref 8–23)
CA-I BLD-MCNC: 4.5 MG/DL (ref 4.4–5.2)
CALCIUM SERPL-MCNC: 8.5 MG/DL (ref 8.8–10.2)
CHLORIDE SERPL-SCNC: 95 MMOL/L (ref 98–107)
CREAT SERPL-MCNC: 2.59 MG/DL (ref 0.67–1.17)
DEPRECATED HCO3 PLAS-SCNC: 26 MMOL/L (ref 22–29)
EGFRCR SERPLBLD CKD-EPI 2021: 26 ML/MIN/1.73M2
EOSINOPHIL # BLD AUTO: 2.2 10E3/UL (ref 0–0.7)
EOSINOPHIL NFR BLD AUTO: 21 %
ERYTHROCYTE [DISTWIDTH] IN BLOOD BY AUTOMATED COUNT: 15.9 % (ref 10–15)
ERYTHROCYTE [DISTWIDTH] IN BLOOD BY AUTOMATED COUNT: 16 % (ref 10–15)
GLUCOSE BLDC GLUCOMTR-MCNC: 116 MG/DL (ref 70–99)
GLUCOSE BLDC GLUCOMTR-MCNC: 119 MG/DL (ref 70–99)
GLUCOSE BLDC GLUCOMTR-MCNC: 138 MG/DL (ref 70–99)
GLUCOSE SERPL-MCNC: 120 MG/DL (ref 70–99)
HBV SURFACE AB SERPL IA-ACNC: 15.4 M[IU]/ML
HBV SURFACE AB SERPL IA-ACNC: REACTIVE M[IU]/ML
HBV SURFACE AG SERPL QL IA: NONREACTIVE
HCO3 BLDV-SCNC: 29 MMOL/L (ref 21–28)
HCT VFR BLD AUTO: 24.3 % (ref 40–53)
HCT VFR BLD AUTO: 25 % (ref 40–53)
HGB BLD-MCNC: 7.5 G/DL (ref 13.3–17.7)
HGB BLD-MCNC: 7.8 G/DL (ref 13.3–17.7)
HGB BLD-MCNC: 8.2 G/DL (ref 13.3–17.7)
HGB BLD-MCNC: 8.3 G/DL (ref 13.3–17.7)
HOLD SPECIMEN: NORMAL
IMM GRANULOCYTES # BLD: 0.2 10E3/UL
IMM GRANULOCYTES NFR BLD: 2 %
LACTATE SERPL-SCNC: 1.5 MMOL/L (ref 0.7–2)
LYMPHOCYTES # BLD AUTO: 0.5 10E3/UL (ref 0.8–5.3)
LYMPHOCYTES NFR BLD AUTO: 5 %
MAGNESIUM SERPL-MCNC: 1.9 MG/DL (ref 1.7–2.3)
MCH RBC QN AUTO: 30.1 PG (ref 26.5–33)
MCH RBC QN AUTO: 30.1 PG (ref 26.5–33)
MCHC RBC AUTO-ENTMCNC: 32.1 G/DL (ref 31.5–36.5)
MCHC RBC AUTO-ENTMCNC: 32.8 G/DL (ref 31.5–36.5)
MCV RBC AUTO: 92 FL (ref 78–100)
MCV RBC AUTO: 94 FL (ref 78–100)
MONOCYTES # BLD AUTO: 0.7 10E3/UL (ref 0–1.3)
MONOCYTES NFR BLD AUTO: 6 %
NEUTROPHILS # BLD AUTO: 6.9 10E3/UL (ref 1.6–8.3)
NEUTROPHILS NFR BLD AUTO: 66 %
NRBC # BLD AUTO: 0 10E3/UL
NRBC BLD AUTO-RTO: 0 /100
O2/TOTAL GAS SETTING VFR VENT: 21 %
OXYHGB MFR BLDV: 51 % (ref 70–75)
PCO2 BLDV: 39 MM HG (ref 40–50)
PH BLDV: 7.48 [PH] (ref 7.32–7.43)
PHOSPHATE SERPL-MCNC: 3.2 MG/DL (ref 2.5–4.5)
PLATELET # BLD AUTO: 130 10E3/UL (ref 150–450)
PLATELET # BLD AUTO: 135 10E3/UL (ref 150–450)
PO2 BLDV: 29 MM HG (ref 25–47)
POTASSIUM SERPL-SCNC: 3.5 MMOL/L (ref 3.4–5.3)
PROT SERPL-MCNC: 6.1 G/DL (ref 6.4–8.3)
RBC # BLD AUTO: 2.59 10E6/UL (ref 4.4–5.9)
RBC # BLD AUTO: 2.72 10E6/UL (ref 4.4–5.9)
SODIUM SERPL-SCNC: 134 MMOL/L (ref 135–145)
UFH PPP CHRO-ACNC: <0.1 IU/ML
UFH PPP CHRO-ACNC: <0.1 IU/ML
VIT B1 PYROPHOSHATE BLD-SCNC: 176 NMOL/L
WBC # BLD AUTO: 10.1 10E3/UL (ref 4–11)
WBC # BLD AUTO: 10.4 10E3/UL (ref 4–11)

## 2023-10-28 PROCEDURE — 36415 COLL VENOUS BLD VENIPUNCTURE: CPT | Performed by: STUDENT IN AN ORGANIZED HEALTH CARE EDUCATION/TRAINING PROGRAM

## 2023-10-28 PROCEDURE — 80053 COMPREHEN METABOLIC PANEL: CPT

## 2023-10-28 PROCEDURE — 84100 ASSAY OF PHOSPHORUS: CPT | Performed by: PHYSICIAN ASSISTANT

## 2023-10-28 PROCEDURE — 97530 THERAPEUTIC ACTIVITIES: CPT | Mod: GO

## 2023-10-28 PROCEDURE — 99232 SBSQ HOSP IP/OBS MODERATE 35: CPT | Mod: FS | Performed by: STUDENT IN AN ORGANIZED HEALTH CARE EDUCATION/TRAINING PROGRAM

## 2023-10-28 PROCEDURE — 85025 COMPLETE CBC W/AUTO DIFF WBC: CPT | Performed by: NURSE PRACTITIONER

## 2023-10-28 PROCEDURE — C9113 INJ PANTOPRAZOLE SODIUM, VIA: HCPCS | Mod: JZ

## 2023-10-28 PROCEDURE — 250N000013 HC RX MED GY IP 250 OP 250 PS 637: Performed by: NURSE PRACTITIONER

## 2023-10-28 PROCEDURE — 250N000011 HC RX IP 250 OP 636: Mod: JZ

## 2023-10-28 PROCEDURE — 85520 HEPARIN ASSAY: CPT | Performed by: SURGERY

## 2023-10-28 PROCEDURE — 36415 COLL VENOUS BLD VENIPUNCTURE: CPT | Performed by: SURGERY

## 2023-10-28 PROCEDURE — 999N000128 HC STATISTIC PERIPHERAL IV START W/O US GUIDANCE

## 2023-10-28 PROCEDURE — 85018 HEMOGLOBIN: CPT

## 2023-10-28 PROCEDURE — 36415 COLL VENOUS BLD VENIPUNCTURE: CPT | Performed by: NURSE PRACTITIONER

## 2023-10-28 PROCEDURE — 120N000002 HC R&B MED SURG/OB UMMC

## 2023-10-28 PROCEDURE — 250N000011 HC RX IP 250 OP 636: Performed by: INTERNAL MEDICINE

## 2023-10-28 PROCEDURE — 85520 HEPARIN ASSAY: CPT

## 2023-10-28 PROCEDURE — 82330 ASSAY OF CALCIUM: CPT | Performed by: PHYSICIAN ASSISTANT

## 2023-10-28 PROCEDURE — 92526 ORAL FUNCTION THERAPY: CPT | Mod: GN

## 2023-10-28 PROCEDURE — 83605 ASSAY OF LACTIC ACID: CPT | Performed by: PHYSICIAN ASSISTANT

## 2023-10-28 PROCEDURE — 82805 BLOOD GASES W/O2 SATURATION: CPT | Performed by: STUDENT IN AN ORGANIZED HEALTH CARE EDUCATION/TRAINING PROGRAM

## 2023-10-28 PROCEDURE — 250N000013 HC RX MED GY IP 250 OP 250 PS 637

## 2023-10-28 PROCEDURE — 97535 SELF CARE MNGMENT TRAINING: CPT | Mod: GO

## 2023-10-28 PROCEDURE — 36415 COLL VENOUS BLD VENIPUNCTURE: CPT

## 2023-10-28 PROCEDURE — 85520 HEPARIN ASSAY: CPT | Performed by: NURSE PRACTITIONER

## 2023-10-28 PROCEDURE — 85018 HEMOGLOBIN: CPT | Performed by: NURSE PRACTITIONER

## 2023-10-28 PROCEDURE — 85014 HEMATOCRIT: CPT

## 2023-10-28 PROCEDURE — 83735 ASSAY OF MAGNESIUM: CPT | Performed by: PHYSICIAN ASSISTANT

## 2023-10-28 PROCEDURE — 250N000011 HC RX IP 250 OP 636: Mod: JZ | Performed by: CLINICAL NURSE SPECIALIST

## 2023-10-28 PROCEDURE — 97165 OT EVAL LOW COMPLEX 30 MIN: CPT | Mod: GO

## 2023-10-28 PROCEDURE — 250N000013 HC RX MED GY IP 250 OP 250 PS 637: Performed by: SURGERY

## 2023-10-28 RX ORDER — FUROSEMIDE 10 MG/ML
120 INJECTION INTRAMUSCULAR; INTRAVENOUS ONCE
Status: COMPLETED | OUTPATIENT
Start: 2023-10-28 | End: 2023-10-28

## 2023-10-28 RX ORDER — MAGNESIUM SULFATE HEPTAHYDRATE 40 MG/ML
2 INJECTION, SOLUTION INTRAVENOUS ONCE
Status: COMPLETED | OUTPATIENT
Start: 2023-10-28 | End: 2023-10-28

## 2023-10-28 RX ORDER — HEPARIN SODIUM 10000 [USP'U]/100ML
0-5000 INJECTION, SOLUTION INTRAVENOUS CONTINUOUS
Status: DISCONTINUED | OUTPATIENT
Start: 2023-10-28 | End: 2023-11-13

## 2023-10-28 RX ADMIN — MAGNESIUM SULFATE IN WATER 2 G: 40 INJECTION, SOLUTION INTRAVENOUS at 08:03

## 2023-10-28 RX ADMIN — HEPARIN SODIUM 850 UNITS/HR: 10000 INJECTION, SOLUTION INTRAVENOUS at 10:31

## 2023-10-28 RX ADMIN — ACETAMINOPHEN 975 MG: 325 TABLET, FILM COATED ORAL at 11:41

## 2023-10-28 RX ADMIN — FUROSEMIDE 120 MG: 10 INJECTION, SOLUTION INTRAVENOUS at 12:45

## 2023-10-28 RX ADMIN — HYDROCORTISONE: 25 CREAM TOPICAL at 19:59

## 2023-10-28 RX ADMIN — CETIRIZINE HYDROCHLORIDE 5 MG: 5 TABLET ORAL at 19:58

## 2023-10-28 RX ADMIN — PANTOPRAZOLE SODIUM 40 MG: 40 INJECTION, POWDER, FOR SOLUTION INTRAVENOUS at 08:03

## 2023-10-28 RX ADMIN — Medication 15 ML: at 08:03

## 2023-10-28 RX ADMIN — ACETAMINOPHEN 975 MG: 325 TABLET, FILM COATED ORAL at 05:30

## 2023-10-28 RX ADMIN — QUETIAPINE FUMARATE 25 MG: 25 TABLET ORAL at 14:22

## 2023-10-28 RX ADMIN — HYDROCORTISONE: 25 CREAM TOPICAL at 08:03

## 2023-10-28 RX ADMIN — QUETIAPINE FUMARATE 100 MG: 100 TABLET ORAL at 22:10

## 2023-10-28 RX ADMIN — Medication 1 MG: at 17:23

## 2023-10-28 RX ADMIN — OXYCODONE HYDROCHLORIDE 5 MG: 5 TABLET ORAL at 05:31

## 2023-10-28 RX ADMIN — ACETAMINOPHEN 975 MG: 325 TABLET, FILM COATED ORAL at 22:10

## 2023-10-28 RX ADMIN — ATORVASTATIN CALCIUM 10 MG: 10 TABLET, FILM COATED ORAL at 19:58

## 2023-10-28 RX ADMIN — TRIAMCINOLONE ACETONIDE: 1 OINTMENT TOPICAL at 17:27

## 2023-10-28 RX ADMIN — QUETIAPINE FUMARATE 25 MG: 25 TABLET ORAL at 08:03

## 2023-10-28 ASSESSMENT — ACTIVITIES OF DAILY LIVING (ADL)
ADLS_ACUITY_SCORE: 51
ADLS_ACUITY_SCORE: 51
ADLS_ACUITY_SCORE: 55
ADLS_ACUITY_SCORE: 55
ADLS_ACUITY_SCORE: 51
ADLS_ACUITY_SCORE: 51
ADLS_ACUITY_SCORE: 55
ADLS_ACUITY_SCORE: 51
ADLS_ACUITY_SCORE: 49
ADLS_ACUITY_SCORE: 55
ADLS_ACUITY_SCORE: 51
ADLS_ACUITY_SCORE: 51

## 2023-10-28 NOTE — PLAN OF CARE
ICU End of Shift Summary. See flowsheets for vital signs and detailed assessment.    Changes this shift: No acute changes.  Intermittently disoriented.  Family at bedside and updated.    Plan: Monitor hemodynamics.  Delirium precautions in place.      Goal Outcome Evaluation:

## 2023-10-28 NOTE — PROGRESS NOTES
soft restraints restraints continued 10/28/2023    Clinical Justification: Pulling lines, pulling tubes, and pulling equipment, Removal of dressing  Less Restrictive Alternative: 1:1 patient care, Repositioning, Pain management, Reorientation  Attending Physician Notified: MD ordered restraint,     New orders placed Yes  Length of Order: 1 Day      Radha Oates RN

## 2023-10-28 NOTE — PROGRESS NOTES
Nephrology Progress Note  10/28/2023       Alfredo Burnham is a 68 yom with complex hx of TIA, HTN, blindness who presented to [facility] 9/24 with severe abdominal pain radiating to flank and back, CT revealed AAA with a contained rupture.  Taken to OR for aortobiiliac bypass and resection of ruptured pararenal aneurysm.   Post op course complicated by hypotension requiring pressors and metabolic acidosis.  Baseline Cr 1.0 on presentation but on the rise to >5 at time of renal consult for MAYA management and possible RRT.       Interval History :   Mr Burnham had HD yesterday, pulled 1L without issue, also made 300cc of UOP.  Holding on run today, will plan for diuretic challenge to see if we can augment UOP and has some prognostic value for renal recovery in coming days.  Hgb stable for several days, next run likely 10/30.  I ordered 120mg lasix to see if UOP can be augmented partially as a prognostic test.       Assessment & Recommendations:   MAYA-Baseline Cr 1.0, ordered UA.  CT showed benign cyst but otherwise normal kidneys.  Cr on the rise since surgery, did receive contrast for CTA.  Urine microscopy showed granular casts suggesting ATN which will recover with time and stabilizing hemodynamics.  Started on CRRT 9/30 for volume and clearance with minimal UOP and rising Cr.  Transitioned to iHD on 10/5.      -Holding on run today.  Plan for HD tomorrow.         -Appreciate IR placing tunneled line RIJ 10/18.                  -Dialysis consent signed and scanned into media tab.      -Infarcts seen on CT 10/13 lowers chances of recovery.          Volume-Pulled 1L on run yesterday while keeping SBP >100, also made 300cc of UOP.  Will give diuretic challenge today to see if we can augment and as prognostic for renal recovery.      Electrolytes-K 3.5, bicarb 26, Na 134.       BMD-Ca 8.5, Mg 1.9.  Phos 3.2, no issues.        Anemia-Hgb 8.2 and stable the past ~24h, continuing to assess for bleeding.   ~14 on  "presentation, acute management per team.       Nutrition-On Osmolite TF.       Time spent: 40 minutes on this date of encounter for chart review, physical exam, medical decision making and co-ordination of care.       Seen and discussed with Dr Ibarra    Recommendations were communicated to primary team via verbal communication.        ALTAGRACIA Jiménez CNS  Clinical Nurse Specialist  [phone]    Review of Systems:   I reviewed the following systems:  ROS not done due to lethargy    Physical Exam:   I/O last 3 completed shifts:  In: 1690 [I.V.:120; NG/GT:535]  Out: 380 [Urine:300; Drains:80]   /68   Pulse 101   Temp 99  F (37.2  C) (Oral)   Resp 10   Ht 1.727 m (5' 8\")   Wt 72.9 kg (160 lb 11.5 oz)   SpO2 (!) 87%   BMI 24.44 kg/m       GENERAL APPEARANCE: Extubated, in no distress.   EYES: No scleral icterus  Pulmonary: On RA  CV: Regular rhythm, normal rate   - Edema +1-2 generalized, + scrotal edema.    GI: distended, nontender  MS: no evidence of inflammation in joints, no muscle tenderness  : No Lu  SKIN: no rash, warm, dry  NEURO: No focal deficits.         Labs:   All labs reviewed by me  Electrolytes/Renal -   Recent Labs   Lab Test 10/28/23  0528 10/28/23  0525 10/27/23  2332 10/27/23  1158 10/27/23  0548 10/26/23  0736 10/26/23  0700   NA  --  134*  --   --  134*  --  134*   POTASSIUM  --  3.5  --   --  4.0  --  4.2   CHLORIDE  --  95*  --   --  97*  --  96*   CO2  --  26  --   --  23  --  24   BUN  --  49.6*  --   --  76.8*  --  51.5*   CR  --  2.59*  --   --  3.82*  --  2.94*   * 120* 115*   < > 126*   < > 120*   OMAR  --  8.5*  --   --  8.5*  --  8.4*   MAG  --  1.9  --   --  1.9  --  1.8   PHOS  --  3.2  --   --  5.2*  --  4.5    < > = values in this interval not displayed.       CBC -   Recent Labs   Lab Test 10/28/23  0525 10/28/23  0107 10/27/23  1758 10/27/23  1255 10/27/23  0548 10/26/23  1819 10/26/23  1051   WBC 10.4  --   --   --  11.5*  --  15.7*   HGB 8.2* 7.5* " 9.2*   < > 8.4*   < > 8.4*   *  --   --   --  124*  --  125*    < > = values in this interval not displayed.       LFTs -   Recent Labs   Lab Test 10/28/23  0525 10/27/23  0548 10/26/23  0700   ALKPHOS 133* 126 111   BILITOTAL 0.5 0.4 0.4   ALT 25 26 20   AST 34 38 52*   PROTTOTAL 6.1* 5.8* 5.9*   ALBUMIN 3.1* 3.0* 2.8*       Iron Panel - No lab results found.        Current Medications:   acetaminophen  975 mg Oral or Feeding Tube Q6H    atorvastatin  10 mg Oral or Feeding Tube QPM    hydrocortisone   Topical BID    magnesium sulfate  2 g Intravenous Once    melatonin  1 mg Oral or Feeding Tube QPM    multivitamins w/minerals  15 mL Per Feeding Tube Daily    pantoprazole  40 mg Intravenous Q24H    protein modular  1 packet Per Feeding Tube Daily    QUEtiapine  100 mg Oral or Feeding Tube At Bedtime    sodium chloride (PF)  10 mL Intravenous Once      dextrose      dextrose Stopped (10/22/23 2106)    heparin 500 Units/hr (10/28/23 0600)

## 2023-10-28 NOTE — PROGRESS NOTES
Vascular Surgery Progress Note  10/28/2023       Subjective:  Alert and somewhat confused to situation, asking for scissors to remove mittens as he would like to be able to change his clothes today. Did pull out IV last night. Denies pain.     Objective:  Temp:  [96.8  F (36  C)-99.1  F (37.3  C)] 99.1  F (37.3  C)  Pulse:  [] 119  Resp:  [10-28] 16  BP: ()/() 141/99  SpO2:  [87 %-99 %] 96 %    I/O last 3 completed shifts:  In: 1690 [I.V.:120; NG/GT:535]  Out: 380 [Urine:300; Drains:80]      Gen: resting comfortably in bed in ICU, answering questions, whispering words, not in acute distress  CV: off pressors, tachycardic  per tele with PACs  Resp: non-labored, on room air   Abd: Abdominal incision in dressing, c/d/I.  No active bleeding, abdomen soft, non-tender  Ext: RLE wwp, palpable DP pulse, LLE stump incision with general surrounding erythema, no obvious bleeding, semifirm, unchanged over the last 3 days.   Prevena LLE placed on 10/23//23 - to be replaced in 5-7 days  Skin: Entire body with blanching erythema covering trunk arms, and legs.     Labs:  Recent Labs   Lab 10/28/23  1134 10/28/23  0525 10/28/23  0107 10/27/23  1255 10/27/23  0548 10/26/23  1819 10/26/23  1051   WBC  --  10.4  --   --  11.5*  --  15.7*   HGB 8.3* 8.2* 7.5*   < > 8.4*   < > 8.4*   PLT  --  135*  --   --  124*  --  125*    < > = values in this interval not displayed.       Recent Labs   Lab 10/28/23  0528 10/28/23  0525 10/27/23  2332 10/27/23  1158 10/27/23  0548 10/26/23  0736 10/26/23  0700   NA  --  134*  --   --  134*  --  134*   POTASSIUM  --  3.5  --   --  4.0  --  4.2   CHLORIDE  --  95*  --   --  97*  --  96*   CO2  --  26  --   --  23  --  24   BUN  --  49.6*  --   --  76.8*  --  51.5*   CR  --  2.59*  --   --  3.82*  --  2.94*   * 120* 115*   < > 126*   < > 120*   OMAR  --  8.5*  --   --  8.5*  --  8.4*   MAG  --  1.9  --   --  1.9  --  1.8   PHOS  --  3.2  --   --  5.2*  --  4.5    < > = values in  this interval not displayed.      Imaging reviewed.    Assessment/Plan:   68 year old male with PMH of HTN and previous TIA who presented with R sided abdominal pain found to have contained ruptured AAA, now s/p open AAA repair 9/24 into 9/25am with 30 min supraceliac clamp time, prolonged inter-renal clamp time, temporary abdominal closure. He did have bloody stool postop with flex sig 9/25 demonstrating ischemic mucosal changes of the rectum, repeated abdominal without evidence of transmural necrosis of the small or large intestine, or the rectum. On 9/29 he was started on CRRT given ongoing low UOP and rising Cr and failure of lasix challenge. Now on iHD. Hemodynamically continues to do well off pressors. S/p closure of abdominal fascia and subcutaneous tissue left open with wound VAC applied 10/2/23. With ischemic LLE, AKA deferred until 10/17/2023 due to leukocytosis, hypoxia requiring reintubation (10/13/23) and critical illness. ADDIS on 10/16/23 without evidence of embolic source. 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 On 10/20/23 underwent abdominal incision closure.     On 10/24/23 found to have hgb 5.8 CTA demonstrated left psoas muscle hematoma now size 6 cm x 3 cm x 3 cm, barely seen on CTA from 10/13. Lesser sac of the peritoneal cavity collection stable, hematoma in left iliac stable, no active extravasation. His heparin drip was stopped and patient required blood transfusions.     Overnight 10/24-10/25 patient more lethargic with fevers at 102.1 thought to be strokelike symptoms, stroke code called, CCTH negative for ICH or subacute CVA, CTA negative for LVO. Hemoglobin dropped again to 5.8, despite holding heparin for now 24 hours. Repeat CTA: triple phase CTA - noncon, arterial, and venous phases, demonstrated hematoma in LLE stump, with otherwise stable left iliac hematoma and stable left psoas muscle hematoma. Patient's hgb stable for the last 48 hours.     He is progressing well with improvements in  mental status over pat 48 hours, now more talkative and interactive, oriented more frequently and when family is around engaging in appropriate conversation.     Neuro:   - Appears intact: appreciate monitoring neuro status.  Episodes of delirium on/off.  Somewhat confused/agitated this am but overall interactive.   - Stroke workup negative - follow-up with neurology 4-6 weeks after discharge, ok with a/c.   - ADDIS for workup 10/16/23 demonstrated no thromboembolic source or shunt identified in cardiac chambers. Grade IV atheroma in descending aorta and aortic arch   CV:   - Off pressors, midodrine 20mg  prn with HD runs   - Goal SBP <160, MAP >65  - Labetalol prn for SBP >160  - Continue statin  - PE+, venous duplex with nonocclusive to occlusive thrombus of the left GSV from the level of the knee to the groin and nonocclusive thrombus of the left distal femoral vein and popliteal veins, increased in extent compared to 9/30/2023.   - Heparin drip advanced to low intensity heparin 10/28, will monitor hgb consider increase pending progress.   - ADDIS 10/16/23 demonstrated no thromboembolic source or shunt, has grade IV atheroma in descending aorta and aortic arch   Resp:   - On room air  GI:   - Pancreatitis with peripancreatic fluid collection on CT with question of bleeding into the collection.  - Do not expect this anterior location of pancreatic fluid to be related to surgical procedure as we were in the RP and did see inferior/posterior aspect of pancreas. Fluid collection is not surrounding aortic graft, this is reassuring. GI signed off as collection not drainable.  - TF tolerating via NJT  - Change abd dressing daily  - Failed SLP, failed video swallow eval, continue NPO with swallow exercises, note possible repeat VSS 10/31. Note ENT consult recommendations, will place consult.   FEN:   - Electrolyte replacement prn   Renal:   - Appreciate nephrology recommendations  - Continue iHD  - Tunneled line with IR  placed 10/18/2023  Heme:   - Hemoglobin stable this morning at 8.3    - DVT as above, PE   - Heparin drip at low intensity, consider increasing to high intensity in coming days. .  - Appreciate aggressive PT/OT ROM, patient severely deconditioned and requires extensive physical therapy.  - Recommending ARU stay post discharge.   ID:   - Fevers 102.1 on 10/25 at 4am. BCx negative  days, received 1 dose of cefepime and flagyl on 10/25, now off antibiotics with slightly improving leukocytosis. Patient has allergy to cefepime (hives). Continue to monitor for fevers, leukocytosis, hemodynamic stability.  - monitor signs of pneumonia  - nystatin swish for oral candida  - monitor leukocytosis  MSK:   - L AKA 10/17/23: prevena applied 10/23/23, to stay on for 5 to 7 days  Derm:   - Has rash on abdomen, trunk, anterior bilateral thighs, erythematous, blanching, however maculopapular does not seem consistent with cellulitis. Cefepime induced.   - Pharmacy reviewed medications for possible causes of rash and felt all were relatively low risk however not 0 risk   - benadryl cream topically applied with no improvement  - Appreciate dermatology recs, likely morbilliform eruption    - CBC with diff daily stable   - LFTs daily    - triamcinolone 0.1% PRN  Misc:   - abd binder on at all times.  - IMC status  - Anticipate will require ARU at discharge    Discussed patient with Dr. Lowe, vascular surgery staff      Will Donald MD   Vascular Surgery PGY3

## 2023-10-28 NOTE — CONSULTS
Otolaryngology Consult Note  October 28, 2023      CC: hoarseness    HPI: Alfredo Burnham is a 68 year old male with a past medical history of AAA rupture s/p repair one month ago with complicated post operative course and prolonged intubation now with aspiration and hoarseness. When extubated one week ago, his voice was soft and breathy. The breathiness has improved however the volume is still very diminished. His AAA repair did not extend into the mediastinum. No history of mediastinal or neck surgery. No voice issues at baseline. NPO per speech for aspiration.        Past Medical History:   Diagnosis Date    Hypertension 2/8/2011    Macular degeneration        Past Surgical History:   Procedure Laterality Date    AMPUTATE LEG ABOVE KNEE Left 10/17/2023    Procedure: AMPUTATION, ABOVE KNEE and Abdominal wound vac exchange;  Surgeon: Ash Boucher MBBS;  Location: [facility] OR    CLOSE SECONDARY WOUND ABDOMEN N/A 10/20/2023    Procedure: Delayed primary subcutaneous abdominal closure;  Surgeon: Boris Lowe;  Location: [facility] OR    INCISION AND DRAINAGE ABDOMEN WASHOUT, COMBINED N/A 9/25/2023    Procedure: abdominal washout, combined, repacking,  abthera placement;  Surgeon: Ash Boucher MBBS;  Location: [facility] OR    INCISION AND DRAINAGE ABDOMEN WASHOUT, COMBINED N/A 9/26/2023    Procedure: Abdominal washout, Retroperitoneal closure, washout left lower extremity wound;  Surgeon: Boris Lowe;  Location: [facility] OR    IR OR ANGIOGRAM  9/25/2023    IRRIGATION AND DEBRIDEMENT ABDOMEN WASHOUT, COMBINED N/A 9/29/2023    Procedure: exploration of  ABDOMINAL CAVITY, placement of abthera;  Surgeon: Boris Lowe;  Location: [facility]    IRRIGATION AND DEBRIDEMENT ABDOMEN WASHOUT, COMBINED N/A 10/2/2023    Procedure: Exploratory laparotomy, abominal closure, wound vac change left lower extremity;  Surgeon: Jonathan Fry MD;  Location: [facility]    IRRIGATION AND DEBRIDEMENT LOWER EXTREMITY, COMBINED Left  9/29/2023    Procedure: exploration of left lower extremity, partial closure of left lower extremity, wound vac placement;  Surgeon: Boris Lowe;  Location: [facility]    LAPAROTOMY EXPLORATORY N/A 9/25/2023    Procedure: Laparotomy exploratory;  Surgeon: Roger Shirley MD;  Location: [facility]    REPAIR ANEURYSM ABDOMINAL AORTA N/A 9/24/2023    Procedure: Resection of Ruptureed Abdominal Aneurysm, Aortic biliary bypass with 20 x 10 mm Bifurcated hemagard graft, Temporary Abdominal Closure, Endovascular Balloon Inclusion of Aorta;  Surgeon: Boris Lowe;  Location: [facility]    SIGMOIDOSCOPY FLEXIBLE N/A 9/25/2023    Procedure: Sigmoidoscopy flexible;  Surgeon: Gabino Walker MD;  Location: [facility]    THROMBECTOMY LOWER EXTREMITY Left 9/25/2023    Procedure: SFA, popliteal, and tibial thromboembolectomy and four compartment fasciotomy;  Surgeon: Ash Boucher MBBS;  Location: [facility] OR       No current outpatient medications on file.          Allergies   Allergen Reactions    Penicillins Swelling     Facial swelling    Has tolerated cefazolin, cefepime       Social History     Socioeconomic History    Marital status:      Spouse name: Not on file    Number of children: Not on file    Years of education: Not on file    Highest education level: Not on file   Occupational History    Not on file   Tobacco Use    Smoking status: Every Day     Packs/day: .5     Types: Cigarettes    Smokeless tobacco: Not on file   Substance and Sexual Activity    Alcohol use: Yes     Comment: 1-2/wk    Drug use: No    Sexual activity: Yes     Partners: Female   Other Topics Concern    Parent/sibling w/ CABG, MI or angioplasty before 65F 55M? Yes   Social History Narrative    Not on file     Social Determinants of Health     Financial Resource Strain: Not on file   Food Insecurity: Not on file   Transportation Needs: Not on file   Physical Activity: Not on file   Stress: Not on file   Social Connections: Not on file  "  Interpersonal Safety: Not on file   Housing Stability: Not on file       Family History   Problem Relation Age of Onset    Unknown/Adopted Mother     Heart Disease Mother     Arthritis Mother     Hypertension Brother     Blood Disease Sister     Psychotic Disorder Sister        ROS: 12 point review of systems is negative unless noted in HPI.    PHYSICAL EXAM:  /86 (BP Location: Right arm)   Pulse 109   Temp 99.2  F (37.3  C) (Axillary)   Resp 16   Ht 1.727 m (5' 8\")   Wt 72.9 kg (160 lb 11.5 oz)   SpO2 95%   BMI 24.44 kg/m    General: laying in bed, no acute distress  HEAD: normocephalic, atraumatic  Face: symmetrical, CN VII intact bilaterally (HB 1), no swelling, edema, or erythema.   Nose: no anterior drainage, intact and midline septum without perforation or hematoma   Neck: no LAD, trachea midline  Respiratory: breathing non-labored on RA, no stridor  Voice: quiet  Psych: pleasant affect  Cardio: extremities warm and well perfused     FIBEROPTIC ENDOSCOPY:  Due to hoarseness and aspiration, fiberoptic laryngoscopy was indicated. After obtaining verbal consent, the nose was topically decongested and anesthetized. The fiberoptic laryngoscope was passed under endoscopic vision through the right nasal passage. The turbinates were normal. The inferior and middle meati were clear bilaterally without purulence, masses, or polyps. The nasopharynx was clear. The eustachian tubes were clear. The soft palate appeared normal with good mobility. The epiglottis was sharp, and the visualized portion of the vallecula was clear. The right false cord had an area of soft tissue swelling. The left vocal fold does not move. The right vocal fold moves but does not completely compensate. The arytenoids were clear, and there was no pooling in the hypopharynx.    ROUTINE IP LABS (Last four results)  BMP  Recent Labs   Lab 10/28/23  0528 10/28/23  0525 10/27/23  2332 10/27/23  2033 10/27/23  1158 10/27/23  0548 " 10/26/23  0736 10/26/23  0700 10/25/23  1144 10/25/23  1112   NA  --  134*  --   --   --  134*  --  134*  --  133*   POTASSIUM  --  3.5  --   --   --  4.0  --  4.2  --  5.1   CHLORIDE  --  95*  --   --   --  97*  --  96*  --  96*   OMAR  --  8.5*  --   --   --  8.5*  --  8.4*  --  8.7*   CO2  --  26  --   --   --  23  --  24  --  25   BUN  --  49.6*  --   --   --  76.8*  --  51.5*  --  88.6*   CR  --  2.59*  --   --   --  3.82*  --  2.94*  --  4.59*   * 120* 115* 134*   < > 126*   < > 120*   < > 149*    < > = values in this interval not displayed.     CBC  Recent Labs   Lab 10/28/23  1541 10/28/23  1134 10/28/23  0525 10/28/23  0107 10/27/23  1255 10/27/23  0548 10/26/23  1819 10/26/23  1051   WBC 10.1  --  10.4  --   --  11.5*  --  15.7*   RBC 2.59*  --  2.72*  --   --  2.79*  --  2.76*   HGB 7.8* 8.3* 8.2* 7.5*   < > 8.4*   < > 8.4*   HCT 24.3*  --  25.0*  --   --  26.1*  --  24.9*   MCV 94  --  92  --   --  94  --  90   MCH 30.1  --  30.1  --   --  30.1  --  30.4   MCHC 32.1  --  32.8  --   --  32.2  --  33.7   RDW 15.9*  --  16.0*  --   --  16.4*  --  17.0*   *  --  135*  --   --  124*  --  125*    < > = values in this interval not displayed.     INR  Recent Labs   Lab 10/25/23  1040 10/25/23  0834   INR 1.24* 1.24*         Assessment and Plan  Alfredo Burnham is a 68 year old male with a past medical history of AAA rupture s/p repair with prolonged intubation now with left vocal fold paralysis causing aspiration.    - Plan for bedside vocal fold injection on Wednesday with Dr. Cobb  - Continued SLP    Reggie Yuen MD  Otolaryngology-Head & Neck Surgery, PGY2  Please page ENT with questions by dialing [phone] and entering job [phone] when prompted.

## 2023-10-28 NOTE — PLAN OF CARE
ICU End of Shift Summary. See flowsheets for vital signs and detailed assessment.                                                                                                                                           Changes this shift: Patient restless and agitated overnight, pulling on lines, hitting bed rails, reach to IV pole, not redirectable,prn Seroquel given for agitation with minimum improvement, oxycodone 5 mg given, blood pressure stable, afebrile, sinus tach with PVC's, remains on room air, lung sound diminished, incontinence of bowel and bladder, had 3 loose bowel movements, wound vac with 80 ml output this shift, hep drip infusing at 500 units/hr straight rate.      Plan:Continue to monitor  POC                                                                                                                                                                                                                                                                                                                                                                                                                                                                                                                                                                                                                                                                                                                                                                                                                                                                                                                                                                                                                                                                                                                                                                                                                                                                                                                                                                                                                                                                                                                                                                                                                                                                                                                                                                                                                                                                                                                                                                                                                                                                                                                                                                                                                                                                                                                                                                                                                                                                                                                                                                                                                                                                                                                                                                                                                                                                                                                                                                                                                                                              Goal Outcome Evaluation:                                                                                                                                                                                                Problem: Restraint,  Nonviolent  Goal: Absence of Harm or Injury                                                                                                Intervention: Implement Least Restrictive Safety Strategies  Recent Flowsheet Documentation  Taken 10/28/2023 0400 by Radha Oates RN  Medical Device Protection: IV pole/bag removed from visual field  Taken 10/27/2023 2000 by Radha Oates RN  Medical Device Protection: IV pole/bag removed from visual field  Intervention: Protect Dignity, Rights and Personal Wellbeing  Recent Flowsheet Documentation  Taken 10/28/2023 0400 by Radha Oates RN  Trust Relationship/Rapport:   care explained   choices provided   thoughts/feelings acknowledged  Taken 10/27/2023 2000 by Radha Oates RN  Trust Relationship/Rapport:   care explained   choices provided   thoughts/feelings acknowledged  Intervention: Protect Skin and Joint Integrity  Recent Flowsheet Documentation  Taken 10/28/2023 0400 by Radha Oates RN  Body Position:   turned   side-lying 30 degrees  Taken 10/28/2023 0200 by Radha Oates RN  Body Position: position changed independently  Taken 10/27/2023 2336 by Radha Oates RN  Body Position:   turned   side-lying 30 degrees  Taken 10/27/2023 2000 by Radha Oates RN  Body Position:   turned   side-lying 30 degrees            Overall Patient Progress: no changeOverall Patient Progress: no change

## 2023-10-28 NOTE — PROGRESS NOTES
10/28/23 1000   Appointment Info   Signing Clinician's Name / Credentials (OT) Emma Schmidt OTRL   Living Environment   People in Home spouse   Current Living Arrangements apartment   Home Accessibility no concerns  (elevator access)   Transportation Anticipated family or friend will provide   Living Environment Comments Pt lives in an apartment with his spouse Tabby, [address] apartment building, no accessibility concerns.   Self-Care   Usual Activity Tolerance good   Current Activity Tolerance poor   Regular Exercise Yes   Activity/Exercise Type walking   Exercise Amount/Frequency daily   Equipment Currently Used at Home none   Fall history within last six months no   Activity/Exercise/Self-Care Comment Pt reports IND at baseline with ADLs/ADLs   General Information   Onset of Illness/Injury or Date of Surgery 10/19/23   Referring Physician Clint Braga MD   Patient/Family Therapy Goal Statement (OT) To get stronger   Additional Occupational Profile Info/Pertinent History of Current Problem Per chart mis Burnham is a 68 year old male with complicated PMHx of HTN, TIA, b/l legally blind who is p/w ruptured AAA s/p repair (9/24/23) c/b bowel ischemia s/p multiple washouts, MAYA requiring HD, PE/DVT, multifocal infarcts 2/2 ESUS (10/6), LLE ischemia s/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 Able to follow/engage in conversation about family/grandkids, though requiring reorientation to time/situation.   Visual Perception   Visual Impairment/Limitations legally blind   Range of Motion Comprehensive   General Range of Motion bilateral upper extremity ROM WFL   Strength Comprehensive (MMT)    Comment, General Manual Muscle Testing (MMT) Assessment BUE generalized weakness   Bed Mobility   Bed Mobility rolling left   Rolling Left Peach (Bed Mobility) moderate assist (50% patient effort)   Transfers   Transfer Comments OH lift, anticipate Ax2   Balance   Balance Comments seated balance with Autumn for short periods   Activities of Daily Living   BADL Assessment/Intervention grooming;lower body dressing;toileting;bathing   Bathing Assessment/Intervention   Peach Level (Bathing) maximum assist (25% patient effort)   Comment, (Bathing) per clinical judgement   Lower Body Dressing Assessment/Training   Comment, (Lower Body Dressing) per clinical judgement   Peach Level (Lower Body Dressing) moderate assist (50% patient effort)   Grooming Assessment/Training   Peach Level (Grooming) set up;verbal cues   Toileting   Comment, (Toileting) per clinical judgement   Peach Level (Toileting) maximum assist (25% patient effort)   Clinical Impression   Criteria for Skilled Therapeutic Interventions Met (OT) Yes, treatment indicated   OT Diagnosis Pt is below baseline for ADLs   OT Problem List-Impairments impacting ADL problems related to;activity tolerance impaired;cognition;balance;coordination;mobility;pain;post-surgical precautions;postural control;strength   Assessment of Occupational Performance 5 or more Performance Deficits   Identified Performance Deficits dressing, bathing, toileting home management, functional mobility   Planned Therapy Interventions (OT) ADL retraining;IADL retraining;strengthening;transfer training;home program guidelines;progressive activity/exercise;balance training   Clinical Decision Making Complexity (OT) detailed assessment/moderate complexity   Risk & Benefits of therapy have been explained evaluation/treatment results reviewed;care plan/treatment goals reviewed;risks/benefits reviewed;current/potential barriers reviewed;participants voiced agreement  with care plan;participants included;patient   Clinical Impression Comments Pt presents below baseline, limited by activity tolerance, new AKA, pain, delirium/confusion, post-surgical precautions. pt will benefit form skilled OT to progress toward PLOF.   OT Total Evaluation Time   OT Eval, Low Complexity Minutes (58520) 8   OT Goals   Therapy Frequency (OT) 5 times/week   OT Predicted Duration/Target Date for Goal Attainment 11/24/23   OT Goals Lower Body Dressing;Lower Body Bathing;Toilet Transfer/Toileting;Cognition   OT: Lower Body Dressing Modified independent;within precautions;using adaptive equipment   OT: Lower Body Bathing using adaptive equipment;with precautions;Supervision/stand-by assist   OT: Toilet Transfer/Toileting Minimal assist;toilet transfer;cleaning and garment management;within precautions   OT: Cognitive Patient/caregiver will verbalize understanding of cognitive assessment results/recommendations as needed for safe discharge planning   Interventions   Interventions Quick Adds Self-Care/Home Management;Therapeutic Activity   Self-Care/Home Management   Self-Care/Home Mgmt/ADL, Compensatory, Meal Prep Minutes (60972) 12   Symptoms Noted During/After Treatment (Meal Preparation/Planning Training) fatigue   Treatment Detail/Skilled Intervention Facilitated UB g/h to progress pt activity tolerance, improve orientation for ADLs. Pt wash face, hair, and brushing teeth with set up A, A for thoroughness with face and hair washing. Pt siting forward in chair intermittently for all activity, min A for short bouts.   Therapeutic Activities   Therapeutic Activity Minutes (37046) 24   Symptoms noted during/after treatment fatigue   Treatment Detail/Skilled Intervention Facilitated functional transfers and OOB activity to progress pt activity tolerance, prevent further delirium. Pt rolling x 2 for pericares and placement of sling, mod A. Pt transferring from bed > chair via OH lift and Ax2, pt able to  manage head and RLE through transfer. Following seated activity (see above) pt left up in chair, all needs in reach and chair alarm on. VSS throughout on RA.   OT Discharge Planning   OT Plan EOB trunk control, UE strengfthening within precautions, seated ADLs   OT Discharge Recommendation (DC Rec) Transitional Care Facility   OT Rationale for DC Rec Pt significantly below baseline, likely to require rehab stay at d/c to progress IND/safety prior to d/c home.   OT Brief overview of current status OH lift   Total Session Time   Timed Code Treatment Minutes 36   Total Session Time (sum of timed and untimed services) 44

## 2023-10-29 ENCOUNTER — APPOINTMENT (OUTPATIENT)
Dept: PHYSICAL THERAPY | Facility: CLINIC | Age: 68
DRG: 268 | End: 2023-10-29
Payer: COMMERCIAL

## 2023-10-29 LAB
ALBUMIN SERPL BCG-MCNC: 3 G/DL (ref 3.5–5.2)
ALP SERPL-CCNC: 128 U/L (ref 40–129)
ALT SERPL W P-5'-P-CCNC: 19 U/L (ref 0–70)
ANION GAP SERPL CALCULATED.3IONS-SCNC: 14 MMOL/L (ref 7–15)
AST SERPL W P-5'-P-CCNC: 25 U/L (ref 0–45)
BASE EXCESS BLDV CALC-SCNC: 3.8 MMOL/L (ref -7.7–1.9)
BILIRUB DIRECT SERPL-MCNC: 0.21 MG/DL (ref 0–0.3)
BILIRUB SERPL-MCNC: 0.4 MG/DL
BUN SERPL-MCNC: 73.7 MG/DL (ref 8–23)
CA-I BLD-MCNC: 4.5 MG/DL (ref 4.4–5.2)
CALCIUM SERPL-MCNC: 8.3 MG/DL (ref 8.8–10.2)
CHLORIDE SERPL-SCNC: 97 MMOL/L (ref 98–107)
CREAT SERPL-MCNC: 3.29 MG/DL (ref 0.67–1.17)
DEPRECATED HCO3 PLAS-SCNC: 24 MMOL/L (ref 22–29)
EGFRCR SERPLBLD CKD-EPI 2021: 19 ML/MIN/1.73M2
ERYTHROCYTE [DISTWIDTH] IN BLOOD BY AUTOMATED COUNT: 15.9 % (ref 10–15)
GLUCOSE SERPL-MCNC: 117 MG/DL (ref 70–99)
HCO3 BLDV-SCNC: 27 MMOL/L (ref 21–28)
HCT VFR BLD AUTO: 23.8 % (ref 40–53)
HGB BLD-MCNC: 7.7 G/DL (ref 13.3–17.7)
MAGNESIUM SERPL-MCNC: 2.6 MG/DL (ref 1.7–2.3)
MCH RBC QN AUTO: 30 PG (ref 26.5–33)
MCHC RBC AUTO-ENTMCNC: 32.4 G/DL (ref 31.5–36.5)
MCV RBC AUTO: 93 FL (ref 78–100)
O2/TOTAL GAS SETTING VFR VENT: 21 %
OXYHGB MFR BLDV: 86 % (ref 70–75)
PCO2 BLDV: 36 MM HG (ref 40–50)
PH BLDV: 7.49 [PH] (ref 7.32–7.43)
PHOSPHATE SERPL-MCNC: 4.1 MG/DL (ref 2.5–4.5)
PLATELET # BLD AUTO: 126 10E3/UL (ref 150–450)
PO2 BLDV: 51 MM HG (ref 25–47)
POTASSIUM SERPL-SCNC: 3.4 MMOL/L (ref 3.4–5.3)
PROT SERPL-MCNC: 5.8 G/DL (ref 6.4–8.3)
RBC # BLD AUTO: 2.57 10E6/UL (ref 4.4–5.9)
SODIUM SERPL-SCNC: 135 MMOL/L (ref 135–145)
UFH PPP CHRO-ACNC: 0.19 IU/ML
UFH PPP CHRO-ACNC: 0.19 IU/ML
UFH PPP CHRO-ACNC: 0.26 IU/ML
UFH PPP CHRO-ACNC: 0.34 IU/ML
WBC # BLD AUTO: 8.6 10E3/UL (ref 4–11)

## 2023-10-29 PROCEDURE — 97530 THERAPEUTIC ACTIVITIES: CPT | Mod: GP | Performed by: STUDENT IN AN ORGANIZED HEALTH CARE EDUCATION/TRAINING PROGRAM

## 2023-10-29 PROCEDURE — 250N000013 HC RX MED GY IP 250 OP 250 PS 637

## 2023-10-29 PROCEDURE — 250N000011 HC RX IP 250 OP 636: Mod: JZ

## 2023-10-29 PROCEDURE — 80053 COMPREHEN METABOLIC PANEL: CPT | Performed by: SURGERY

## 2023-10-29 PROCEDURE — 97110 THERAPEUTIC EXERCISES: CPT | Mod: GP | Performed by: STUDENT IN AN ORGANIZED HEALTH CARE EDUCATION/TRAINING PROGRAM

## 2023-10-29 PROCEDURE — C9113 INJ PANTOPRAZOLE SODIUM, VIA: HCPCS | Mod: JZ

## 2023-10-29 PROCEDURE — 84100 ASSAY OF PHOSPHORUS: CPT | Performed by: PHYSICIAN ASSISTANT

## 2023-10-29 PROCEDURE — 99232 SBSQ HOSP IP/OBS MODERATE 35: CPT | Mod: FS | Performed by: STUDENT IN AN ORGANIZED HEALTH CARE EDUCATION/TRAINING PROGRAM

## 2023-10-29 PROCEDURE — 83735 ASSAY OF MAGNESIUM: CPT | Performed by: PHYSICIAN ASSISTANT

## 2023-10-29 PROCEDURE — 36415 COLL VENOUS BLD VENIPUNCTURE: CPT | Performed by: STUDENT IN AN ORGANIZED HEALTH CARE EDUCATION/TRAINING PROGRAM

## 2023-10-29 PROCEDURE — 85520 HEPARIN ASSAY: CPT | Performed by: SURGERY

## 2023-10-29 PROCEDURE — 250N000013 HC RX MED GY IP 250 OP 250 PS 637: Performed by: NURSE PRACTITIONER

## 2023-10-29 PROCEDURE — 85014 HEMATOCRIT: CPT | Performed by: SURGERY

## 2023-10-29 PROCEDURE — 82805 BLOOD GASES W/O2 SATURATION: CPT | Performed by: STUDENT IN AN ORGANIZED HEALTH CARE EDUCATION/TRAINING PROGRAM

## 2023-10-29 PROCEDURE — 250N000013 HC RX MED GY IP 250 OP 250 PS 637: Performed by: SURGERY

## 2023-10-29 PROCEDURE — 82330 ASSAY OF CALCIUM: CPT | Performed by: SURGERY

## 2023-10-29 PROCEDURE — 999N000128 HC STATISTIC PERIPHERAL IV START W/O US GUIDANCE

## 2023-10-29 PROCEDURE — 36415 COLL VENOUS BLD VENIPUNCTURE: CPT | Performed by: SURGERY

## 2023-10-29 PROCEDURE — 120N000002 HC R&B MED SURG/OB UMMC

## 2023-10-29 RX ORDER — TRIAMCINOLONE ACETONIDE 1 MG/G
OINTMENT TOPICAL 2 TIMES DAILY
Status: DISCONTINUED | OUTPATIENT
Start: 2023-10-29 | End: 2023-11-14

## 2023-10-29 RX ORDER — HYDROXYZINE HYDROCHLORIDE 25 MG/1
25 TABLET, FILM COATED ORAL ONCE
Status: COMPLETED | OUTPATIENT
Start: 2023-10-29 | End: 2023-10-29

## 2023-10-29 RX ADMIN — HYDROCORTISONE: 25 CREAM TOPICAL at 20:17

## 2023-10-29 RX ADMIN — QUETIAPINE FUMARATE 25 MG: 25 TABLET ORAL at 02:56

## 2023-10-29 RX ADMIN — QUETIAPINE FUMARATE 100 MG: 100 TABLET ORAL at 21:08

## 2023-10-29 RX ADMIN — QUETIAPINE FUMARATE 25 MG: 25 TABLET ORAL at 23:48

## 2023-10-29 RX ADMIN — Medication 1 MG: at 17:08

## 2023-10-29 RX ADMIN — HYDROXYZINE HYDROCHLORIDE 25 MG: 25 TABLET, FILM COATED ORAL at 23:48

## 2023-10-29 RX ADMIN — PANTOPRAZOLE SODIUM 40 MG: 40 INJECTION, POWDER, FOR SOLUTION INTRAVENOUS at 07:39

## 2023-10-29 RX ADMIN — Medication 15 ML: at 07:38

## 2023-10-29 RX ADMIN — TRIAMCINOLONE ACETONIDE: 1 OINTMENT TOPICAL at 20:18

## 2023-10-29 RX ADMIN — HEPARIN SODIUM 1300 UNITS/HR: 10000 INJECTION, SOLUTION INTRAVENOUS at 04:19

## 2023-10-29 RX ADMIN — HEPARIN SODIUM 1450 UNITS/HR: 10000 INJECTION, SOLUTION INTRAVENOUS at 20:15

## 2023-10-29 RX ADMIN — ATORVASTATIN CALCIUM 10 MG: 10 TABLET, FILM COATED ORAL at 20:17

## 2023-10-29 RX ADMIN — TRIAMCINOLONE ACETONIDE: 1 OINTMENT TOPICAL at 02:57

## 2023-10-29 RX ADMIN — ACETAMINOPHEN 975 MG: 325 TABLET, FILM COATED ORAL at 11:26

## 2023-10-29 RX ADMIN — ACETAMINOPHEN 975 MG: 325 TABLET, FILM COATED ORAL at 04:19

## 2023-10-29 RX ADMIN — Medication: at 00:45

## 2023-10-29 RX ADMIN — QUETIAPINE FUMARATE 25 MG: 25 TABLET ORAL at 07:38

## 2023-10-29 RX ADMIN — QUETIAPINE FUMARATE 25 MG: 25 TABLET ORAL at 17:08

## 2023-10-29 RX ADMIN — HYDROCORTISONE: 25 CREAM TOPICAL at 07:39

## 2023-10-29 RX ADMIN — ACETAMINOPHEN 975 MG: 325 TABLET, FILM COATED ORAL at 23:48

## 2023-10-29 RX ADMIN — ACETAMINOPHEN 975 MG: 325 TABLET, FILM COATED ORAL at 17:08

## 2023-10-29 ASSESSMENT — ACTIVITIES OF DAILY LIVING (ADL)
ADLS_ACUITY_SCORE: 45
ADLS_ACUITY_SCORE: 45
ADLS_ACUITY_SCORE: 49
ADLS_ACUITY_SCORE: 49
ADLS_ACUITY_SCORE: 45
ADLS_ACUITY_SCORE: 49
ADLS_ACUITY_SCORE: 45
ADLS_ACUITY_SCORE: 49
ADLS_ACUITY_SCORE: 45

## 2023-10-29 NOTE — PROGRESS NOTES
Vascular Surgery Progress Note  10/29/2023       Subjective:  Alert and interactive today. Complains of some itching on arms. Had 700cc UOP yesterday + 1x uncharted.       Objective:  Temp:  [98.1  F (36.7  C)-99.2  F (37.3  C)] 98.3  F (36.8  C)  Pulse:  [102-114] 110  Resp:  [16] 16  BP: (106-133)/(66-92) 114/66  SpO2:  [87 %-97 %] 95 %    I/O last 3 completed shifts:  In: 1978.84 [I.V.:453.84; NG/GT:445]  Out: 920 [Urine:900; Drains:20]      Gen: resting comfortably in bed in ICU, answering questions, whispering words, not in acute distress  CV: off pressors, tachycardic  per tele with PVCs  Resp: non-labored, on room air   Abd: Abdominal incision examined, c/d/I. No active bleeding, abdomen soft, non-tender  Ext: RLE wwp, palpable DP pulse, LLE stump incision with general surrounding erythema similar to erythema of entire body, no obvious bleeding, soft today, incisional vac still holding seal.    Prevena LLE placed on 10/23//23 - to be replaced in 5-7 days  Skin: Entire body with blanching erythema covering trunk arms, and legs.     Labs:  Recent Labs   Lab 10/29/23  0401 10/28/23  1541 10/28/23  1134 10/28/23  0525   WBC 8.6 10.1  --  10.4   HGB 7.7* 7.8* 8.3* 8.2*   * 130*  --  135*       Recent Labs   Lab 10/29/23  0401 10/28/23  2346 10/28/23  1951 10/28/23  0528 10/28/23  0525 10/27/23  1158 10/27/23  0548     --   --   --  134*  --  134*   POTASSIUM 3.4  --   --   --  3.5  --  4.0   CHLORIDE 97*  --   --   --  95*  --  97*   CO2 24  --   --   --  26 --  23   BUN 73.7*  --   --   --  49.6*  --  76.8*   CR 3.29*  --   --   --  2.59*  --  3.82*   * 119* 138*   < > 120*   < > 126*   OMAR 8.3*  --   --   --  8.5*  --  8.5*   MAG 2.6*  --   --   --  1.9  --  1.9   PHOS 4.1  --   --   --  3.2  --  5.2*    < > = values in this interval not displayed.      Imaging reviewed.    Assessment/Plan:   68 year old male with PMH of HTN and previous TIA who presented with R sided abdominal pain found to  have contained ruptured AAA, now s/p open AAA repair 9/24 into 9/25am with 30 min supraceliac clamp time, prolonged inter-renal clamp time, temporary abdominal closure. He did have bloody stool postop with flex sig 9/25 demonstrating ischemic mucosal changes of the rectum, repeated abdominal without evidence of transmural necrosis of the small or large intestine, or the rectum. On 9/29 he was started on CRRT, now on iHD. S/p closure of abdominal fascia and subcutaneous tissue wound VAC applied 10/2/23. 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 On 10/20/23 underwent abdominal incision closure.     On 10/24/23 found to have hgb 5.8, CTA demonstrated left psoas muscle hematoma now size 6 cm x 3 cm x 3 cm, barely seen on CTA from 10/13. Lesser sac of the peritoneal cavity collection stable, hematoma in left iliac stable, no active extravasation. Hep gtt held.     Overnight 10/24-10/25 patient more lethargic with fevers at 102.1 thought to be strokelike symptoms, stroke code called, workup negative. Hemoglobin dropped again to 5.8, despite holding heparin for 24 hours. Repeat CTA: demonstrated hematoma in LLE stump, with otherwise stable left iliac hematoma and stable left psoas muscle hematoma. Patient's hgb stable, some drift today to 7.7, will continue at low intensity heparin today.     He is progressing well with continued improvements in mental status over past 48 hours, now more talkative and interactive, oriented more frequently and when family is around engaging in appropriate conversation.     Neuro:   - Alert, oriented now, improving daily.  Episodes of delirium on/off.   - Previously had stroke code called for unequal pupils, c/f facial droop 10/6: workup revealed: Numerous, punctate late hyperacute to acute infarctions thought to be embolic in nature.  No embolic source identified in workup.  - Follow-up with neurology 4-6 weeks after discharge, ok with a/c.   HEENT:   - ENT consult for ongoing  dysphonia/dysphagia   - Endoscopy with L vocal cord paralysis   - Plan for bedside vocal fold injection 11/1 with ENT  CV:   - Midodrine 20mg  prn with HD runs   - Goal SBP <160, MAP >65  - Labetalol prn for SBP >160  - Continue statin  - PE+, venous duplex with nonocclusive to occlusive thrombus of the left GSV from the level of the knee to the groin and nonocclusive thrombus of the left distal femoral vein and popliteal veins, increased in extent compared to 9/30/2023.   - Heparin drip advanced to low intensity heparin 10/28, will monitor hgb consider increase pending progress.   Resp:   - On room air  GI:   - Concern for Pancreatitis with peripancreatic fluid collection on CT 10/13 with question of bleeding into the collection. GI signed off as collection not drainable. - Improved appearance on repeat CT 10/25. Monitoring.    - TF tolerating via NJT  - Change abd dressing daily  - Failed SLP, failed video swallow eval, continue NPO with swallow exercises, note possible repeat VSS 10/31.   FEN:   - Electrolyte replacement prn   Renal:   - Appreciate nephrology recommendations  - Lasix challenge 10/28 with 700cc UOP + 1x unmeasured, 200cc since mn.   - Holding on dialysis 10/29 per nephro note, will continue to montor  - Continue iHD per nephrology  - Tunneled line with IR placed 10/18/2023  Heme:   - Hemoglobin with some drift to 7.7 after increasing to low intensity hep gtt, will monitor at this time.     - DVT as above, PE   - Heparin drip at low intensity, consider increasing to high intensity in coming days pending hgb.   ID:   - Fevers 102.1 on 10/25 at 4am. BCx negative  days, received 1 dose of cefepime and flagyl on 10/25, now off antibiotics with slightly improving leukocytosis. Patient has allergy to cefepime (hives). Continue to monitor for fevers, leukocytosis, hemodynamic stability.  - monitor signs of pneumonia  - nystatin swish for oral candida  - monitor leukocytosis  MSK:   - L AKA 10/17/23:  prevena applied 10/23/23, to stay on for 5 to 7 days  Derm:   - Has rash on abdomen, trunk, anterior bilateral thighs, erythematous, blanching, however maculopapular does not seem consistent with cellulitis.   - Pharm reviewed meds, all low risk, derm consulted, ID has seen as well, all felt were likely drug induced presumably from cefepime  - Did receive 1 dose cefepime 10/25 - this may be cause of worsening.  - Touched base with derm today on if any other recs for treatment/if concern for any other cause given worsening.  - Appreciate dermatology recs, likely morbilliform eruption vs DRESS (low risk)    - CBC with diff, will repeat 10/30   - LFTs, will repeat 10/30   - triamcinolone 0.1% changed back to BID scheduled 10/29 given worsening   Misc:   - Appreciate aggressive PT/OT ROM, patient severely deconditioned and requires extensive physical therapy.  - abd binder on at all times.  - please keep LLE AKA wrapped with ACE. Skin breaks okay  - IMC status  - Anticipate will require ARU at discharge    Discussed patient with vascular surgery staff who Is in agreement with the above.      Will Donald MD   Vascular Surgery PGY3

## 2023-10-29 NOTE — PROGRESS NOTES
Nephrology Progress Note  10/29/2023       Alfredo Burnham is a 68 yom with complex hx of TIA, HTN, blindness who presented to [facility] 9/24 with severe abdominal pain radiating to flank and back, CT revealed AAA with a contained rupture.  Taken to OR for aortobiiliac bypass and resection of ruptured pararenal aneurysm.   Post op course complicated by hypotension requiring pressors and metabolic acidosis.  Baseline Cr 1.0 on presentation but on the rise to >5 at time of renal consult for MAYA management and possible RRT.       Interval History :   Mr Burnham had day off HD yesterday, gave 120mg lasix and he ended up making 700cc plus 1x unmeasured UOP.  Cr still up at likely anephric rate but will hold on HD today and see how this trends before likely run tomorrow.      Assessment & Recommendations:   MAYA-Baseline Cr 1.0, ordered UA.  CT initially showed benign cyst but otherwise normal kidneys but subsequent imaging showed scattered areas of infarct related to emboli with AAA.  Cr on the rise since surgery, did receive contrast for CTA.  Urine microscopy showed granular casts suggesting ATN which will recover with time and stabilizing hemodynamics.  Started on CRRT 9/30 for volume and clearance with minimal UOP and rising Cr.  Transitioned to iHD on 10/5 and watching for recovery,       -Holding on run today.  Tentative plan for HD tomorrow.         -Appreciate IR placing tunneled line RIJ 10/18.                  -Dialysis consent signed and scanned into media tab.      -Infarcts seen on CT 10/13 lowers chances of recovery.          Volume-Pulled 1L on run yesterday while keeping SBP >100, also made 300cc of UOP.  Will give diuretic challenge today to see if we can augment and as prognostic for renal recovery.      Electrolytes-K 3.4, bicarb 24, Na 135.       BMD-Ca 8.3, Mg 2.6.  Phos 2.6, no issues.        Anemia-Hgb 7.7 and stable the past ~24h, continuing to assess for bleeding.   ~14 on presentation, acute  "management per team.       Nutrition-On Osmolite TF.       Time spent: 40 minutes on this date of encounter for chart review, physical exam, medical decision making and co-ordination of care.       Seen and discussed with Dr Ibarra    Recommendations were communicated to primary team via verbal communication.        ALTAGRACIA Jiménez CNS  Clinical Nurse Specialist  [phone]    Review of Systems:   I reviewed the following systems:  ROS not done due to lethargy    Physical Exam:   I/O last 3 completed shifts:  In: 1978.84 [I.V.:453.84; NG/GT:445]  Out: 920 [Urine:900; Drains:20]   /66 (BP Location: Right arm)   Pulse 110   Temp 98.3  F (36.8  C) (Axillary)   Resp 16   Ht 1.727 m (5' 8\")   Wt 72.9 kg (160 lb 11.5 oz)   SpO2 95%   BMI 24.44 kg/m       GENERAL APPEARANCE: Extubated, in no distress.   EYES: No scleral icterus  Pulmonary: On RA  CV: Regular rhythm, normal rate   - Edema +1-2 generalized, + scrotal edema.    GI: distended, nontender  MS: no evidence of inflammation in joints, no muscle tenderness  : No Lu  SKIN: no rash, warm, dry  NEURO: No focal deficits.         Labs:   All labs reviewed by me  Electrolytes/Renal -   Recent Labs   Lab Test 10/29/23  0401 10/28/23  2346 10/28/23  1951 10/28/23  0528 10/28/23  0525 10/27/23  1158 10/27/23  0548     --   --   --  134*  --  134*   POTASSIUM 3.4  --   --   --  3.5  --  4.0   CHLORIDE 97*  --   --   --  95*  --  97*   CO2 24  --   --   --  26  --  23   BUN 73.7*  --   --   --  49.6*  --  76.8*   CR 3.29*  --   --   --  2.59*  --  3.82*   * 119* 138*   < > 120*   < > 126*   OMAR 8.3*  --   --   --  8.5*  --  8.5*   MAG 2.6*  --   --   --  1.9  --  1.9   PHOS 4.1  --   --   --  3.2  --  5.2*    < > = values in this interval not displayed.       CBC -   Recent Labs   Lab Test 10/29/23  0401 10/28/23  1541 10/28/23  1134 10/28/23  0525   WBC 8.6 10.1  --  10.4   HGB 7.7* 7.8* 8.3* 8.2*   * 130*  --  135*       LFTs - "   Recent Labs   Lab Test 10/29/23  0401 10/28/23  0525 10/27/23  0548   ALKPHOS 128 133* 126   BILITOTAL 0.4 0.5 0.4   ALT 19 25 26   AST 25 34 38   PROTTOTAL 5.8* 6.1* 5.8*   ALBUMIN 3.0* 3.1* 3.0*       Iron Panel - No lab results found.        Current Medications:   acetaminophen  975 mg Oral or Feeding Tube Q6H    atorvastatin  10 mg Oral or Feeding Tube QPM    hydrocortisone   Topical BID    melatonin  1 mg Oral or Feeding Tube QPM    multivitamins w/minerals  15 mL Per Feeding Tube Daily    pantoprazole  40 mg Intravenous Q24H    protein modular  1 packet Per Feeding Tube Daily    QUEtiapine  100 mg Oral or Feeding Tube At Bedtime    sodium chloride (PF)  10 mL Intravenous Once      dextrose      dextrose Stopped (10/22/23 2106)    heparin 1,300 Units/hr (10/29/23 0600)

## 2023-10-29 NOTE — PLAN OF CARE
ICU End of Shift Summary. See flowsheets for vital signs and detailed assessment.    Changes this shift: Pt more oriented this shift. Continues to be somewhat restless, but likely d/t the discomfort caused by his rash. PRN seroquel given as well as PRN  zyrtec. Pt occasionally in bigeminy - MD notified, no interventions at this time. Stable on RA.     Plan: Continue with plan of care and notify team with changes.

## 2023-10-30 ENCOUNTER — APPOINTMENT (OUTPATIENT)
Dept: PHYSICAL THERAPY | Facility: CLINIC | Age: 68
DRG: 268 | End: 2023-10-30
Payer: COMMERCIAL

## 2023-10-30 ENCOUNTER — APPOINTMENT (OUTPATIENT)
Dept: OCCUPATIONAL THERAPY | Facility: CLINIC | Age: 68
DRG: 268 | End: 2023-10-30
Payer: COMMERCIAL

## 2023-10-30 ENCOUNTER — APPOINTMENT (OUTPATIENT)
Dept: SPEECH THERAPY | Facility: CLINIC | Age: 68
DRG: 268 | End: 2023-10-30
Payer: COMMERCIAL

## 2023-10-30 LAB
ALBUMIN SERPL BCG-MCNC: 3.1 G/DL (ref 3.5–5.2)
ALP SERPL-CCNC: 132 U/L (ref 40–129)
ALT SERPL W P-5'-P-CCNC: 15 U/L (ref 0–70)
ANION GAP SERPL CALCULATED.3IONS-SCNC: 14 MMOL/L (ref 7–15)
AST SERPL W P-5'-P-CCNC: 24 U/L (ref 0–45)
BACTERIA BLD CULT: NO GROWTH
BACTERIA BLD CULT: NO GROWTH
BASOPHILS # BLD AUTO: 0 10E3/UL (ref 0–0.2)
BASOPHILS NFR BLD AUTO: 0 %
BILIRUB DIRECT SERPL-MCNC: <0.2 MG/DL (ref 0–0.3)
BILIRUB SERPL-MCNC: 0.5 MG/DL
BUN SERPL-MCNC: 90.8 MG/DL (ref 8–23)
CALCIUM SERPL-MCNC: 8.6 MG/DL (ref 8.8–10.2)
CHLORIDE SERPL-SCNC: 98 MMOL/L (ref 98–107)
CREAT SERPL-MCNC: 3.53 MG/DL (ref 0.67–1.17)
DEPRECATED HCO3 PLAS-SCNC: 23 MMOL/L (ref 22–29)
EGFRCR SERPLBLD CKD-EPI 2021: 18 ML/MIN/1.73M2
EOSINOPHIL # BLD AUTO: 1.8 10E3/UL (ref 0–0.7)
EOSINOPHIL NFR BLD AUTO: 19 %
ERYTHROCYTE [DISTWIDTH] IN BLOOD BY AUTOMATED COUNT: 15.8 % (ref 10–15)
GLUCOSE BLDC GLUCOMTR-MCNC: 110 MG/DL (ref 70–99)
GLUCOSE BLDC GLUCOMTR-MCNC: 122 MG/DL (ref 70–99)
GLUCOSE BLDC GLUCOMTR-MCNC: 132 MG/DL (ref 70–99)
GLUCOSE SERPL-MCNC: 137 MG/DL (ref 70–99)
HCT VFR BLD AUTO: 24.7 % (ref 40–53)
HGB BLD-MCNC: 7.9 G/DL (ref 13.3–17.7)
IMM GRANULOCYTES # BLD: 0.1 10E3/UL
IMM GRANULOCYTES NFR BLD: 1 %
LYMPHOCYTES # BLD AUTO: 0.5 10E3/UL (ref 0.8–5.3)
LYMPHOCYTES NFR BLD AUTO: 6 %
MAGNESIUM SERPL-MCNC: 2.4 MG/DL (ref 1.7–2.3)
MCH RBC QN AUTO: 29.8 PG (ref 26.5–33)
MCHC RBC AUTO-ENTMCNC: 32 G/DL (ref 31.5–36.5)
MCV RBC AUTO: 93 FL (ref 78–100)
MONOCYTES # BLD AUTO: 0.6 10E3/UL (ref 0–1.3)
MONOCYTES NFR BLD AUTO: 7 %
NEUTROPHILS # BLD AUTO: 6 10E3/UL (ref 1.6–8.3)
NEUTROPHILS NFR BLD AUTO: 67 %
NRBC # BLD AUTO: 0 10E3/UL
NRBC BLD AUTO-RTO: 0 /100
PHOSPHATE SERPL-MCNC: 4.2 MG/DL (ref 2.5–4.5)
PLATELET # BLD AUTO: 138 10E3/UL (ref 150–450)
POTASSIUM SERPL-SCNC: 3.4 MMOL/L (ref 3.4–5.3)
PROT SERPL-MCNC: 6.1 G/DL (ref 6.4–8.3)
RBC # BLD AUTO: 2.65 10E6/UL (ref 4.4–5.9)
SCANNED LAB RESULT: NORMAL
SCANNED LAB RESULT: NORMAL
SODIUM SERPL-SCNC: 135 MMOL/L (ref 135–145)
UFH PPP CHRO-ACNC: 0.37 IU/ML
WBC # BLD AUTO: 9 10E3/UL (ref 4–11)

## 2023-10-30 PROCEDURE — 84100 ASSAY OF PHOSPHORUS: CPT

## 2023-10-30 PROCEDURE — 97530 THERAPEUTIC ACTIVITIES: CPT | Mod: GP

## 2023-10-30 PROCEDURE — 250N000013 HC RX MED GY IP 250 OP 250 PS 637: Performed by: SURGERY

## 2023-10-30 PROCEDURE — 92526 ORAL FUNCTION THERAPY: CPT | Mod: GN

## 2023-10-30 PROCEDURE — C9113 INJ PANTOPRAZOLE SODIUM, VIA: HCPCS | Mod: JZ

## 2023-10-30 PROCEDURE — 80053 COMPREHEN METABOLIC PANEL: CPT

## 2023-10-30 PROCEDURE — 250N000011 HC RX IP 250 OP 636: Mod: JZ

## 2023-10-30 PROCEDURE — 99024 POSTOP FOLLOW-UP VISIT: CPT | Performed by: NURSE PRACTITIONER

## 2023-10-30 PROCEDURE — 250N000013 HC RX MED GY IP 250 OP 250 PS 637: Performed by: NURSE PRACTITIONER

## 2023-10-30 PROCEDURE — 85520 HEPARIN ASSAY: CPT | Performed by: SURGERY

## 2023-10-30 PROCEDURE — 97535 SELF CARE MNGMENT TRAINING: CPT | Mod: GO

## 2023-10-30 PROCEDURE — 99207 PR NO BILLABLE SERVICE THIS VISIT: CPT | Mod: GC | Performed by: DERMATOLOGY

## 2023-10-30 PROCEDURE — 83735 ASSAY OF MAGNESIUM: CPT

## 2023-10-30 PROCEDURE — 85025 COMPLETE CBC W/AUTO DIFF WBC: CPT

## 2023-10-30 PROCEDURE — 120N000003 HC R&B IMCU UMMC

## 2023-10-30 PROCEDURE — 36415 COLL VENOUS BLD VENIPUNCTURE: CPT

## 2023-10-30 PROCEDURE — 97530 THERAPEUTIC ACTIVITIES: CPT | Mod: GO

## 2023-10-30 PROCEDURE — 250N000013 HC RX MED GY IP 250 OP 250 PS 637

## 2023-10-30 PROCEDURE — 99232 SBSQ HOSP IP/OBS MODERATE 35: CPT | Mod: FS | Performed by: CLINICAL NURSE SPECIALIST

## 2023-10-30 RX ORDER — VIT B COMP NO.3/FOLIC/C/BIOTIN 1 MG-60 MG
1 TABLET ORAL DAILY
Status: DISCONTINUED | OUTPATIENT
Start: 2023-10-31 | End: 2023-10-31

## 2023-10-30 RX ADMIN — HYDROCORTISONE: 25 CREAM TOPICAL at 20:12

## 2023-10-30 RX ADMIN — Medication 15 ML: at 09:15

## 2023-10-30 RX ADMIN — TRIAMCINOLONE ACETONIDE: 1 OINTMENT TOPICAL at 09:17

## 2023-10-30 RX ADMIN — ACETAMINOPHEN 975 MG: 325 TABLET, FILM COATED ORAL at 11:54

## 2023-10-30 RX ADMIN — ACETAMINOPHEN 975 MG: 325 TABLET, FILM COATED ORAL at 17:50

## 2023-10-30 RX ADMIN — HEPARIN SODIUM 1450 UNITS/HR: 10000 INJECTION, SOLUTION INTRAVENOUS at 15:15

## 2023-10-30 RX ADMIN — HYDROCORTISONE: 25 CREAM TOPICAL at 09:16

## 2023-10-30 RX ADMIN — PANTOPRAZOLE SODIUM 40 MG: 40 INJECTION, POWDER, FOR SOLUTION INTRAVENOUS at 09:15

## 2023-10-30 RX ADMIN — ACETAMINOPHEN 975 MG: 325 TABLET, FILM COATED ORAL at 05:47

## 2023-10-30 RX ADMIN — ACETAMINOPHEN 975 MG: 325 TABLET, FILM COATED ORAL at 23:52

## 2023-10-30 RX ADMIN — QUETIAPINE FUMARATE 100 MG: 100 TABLET ORAL at 22:46

## 2023-10-30 RX ADMIN — TRIAMCINOLONE ACETONIDE: 1 OINTMENT TOPICAL at 20:13

## 2023-10-30 RX ADMIN — CETIRIZINE HYDROCHLORIDE 5 MG: 5 TABLET ORAL at 22:46

## 2023-10-30 RX ADMIN — ATORVASTATIN CALCIUM 10 MG: 10 TABLET, FILM COATED ORAL at 20:12

## 2023-10-30 RX ADMIN — Medication 1 MG: at 17:50

## 2023-10-30 ASSESSMENT — ACTIVITIES OF DAILY LIVING (ADL)
ADLS_ACUITY_SCORE: 49
ADLS_ACUITY_SCORE: 45
ADLS_ACUITY_SCORE: 49

## 2023-10-30 NOTE — PROGRESS NOTES
Vascular Surgery Progress Note  10/30/2023       Subjective:  ENT consulted over the weekend, bedside vocal fold injection on Wednesday with Dr. Cobb. Made 700ml UO yesterday with 1 episode of unmeasured incontinence, already 600 mL of UO this morning. Creat this morning at 3.53 (yesterday was 3.29), electrolytes normal, no leukocytosis, hemoglobin stable at 7.9.     Objective:  Temp:  [98.3  F (36.8  C)-98.8  F (37.1  C)] 98.8  F (37.1  C)  Pulse:  [103-117] 106  Resp:  [16-18] 16  BP: (132-152)/() 152/97  SpO2:  [92 %-96 %] 95 %    I/O last 3 completed shifts:  In: 1737.5 [I.V.:357.5; NG/GT:300]  Out: 700 [Urine:700]      Gen: resting comfortably in bed in ICU, answering questions, whispering words, not in acute distress  CV: off pressors, tachycardic per tele with PVCs  Resp: non-labored, on room air   Abd: Abdominal incision examined, c/d/I. No active bleeding, abdomen soft, non-tender  Ext: RLE wwp, palpable DP pulse, LLE stump incision with general surrounding erythema similar to erythema of entire body, no obvious bleeding, soft today, incisional vac/prevena holding seal.    Prevena LLE placed on 10/30/23 - to be replaced in 5-7 days  Skin: Entire body with blanching erythema covering trunk arms, and legs.     Labs:  Recent Labs   Lab 10/29/23  0401 10/28/23  1541 10/28/23  1134 10/28/23  0525   WBC 8.6 10.1  --  10.4   HGB 7.7* 7.8* 8.3* 8.2*   * 130*  --  135*       Recent Labs   Lab 10/29/23  0401 10/28/23  2346 10/28/23  1951 10/28/23  0528 10/28/23  0525 10/27/23  1158 10/27/23  0548     --   --   --  134*  --  134*   POTASSIUM 3.4  --   --   --  3.5  --  4.0   CHLORIDE 97*  --   --   --  95*  --  97*   CO2 24  --   --   --  26  --  23   BUN 73.7*  --   --   --  49.6*  --  76.8*   CR 3.29*  --   --   --  2.59*  --  3.82*   * 119* 138*   < > 120*   < > 126*   OMAR 8.3*  --   --   --  8.5*  --  8.5*   MAG 2.6*  --   --   --  1.9  --  1.9   PHOS 4.1  --   --   --  3.2  --  5.2*    <  > = values in this interval not displayed.                  Assessment/Plan:   68 year old male with PMH of HTN and previous TIA who presented with R sided abdominal pain found to have contained ruptured AAA, now s/p open AAA repair 9/24 into 9/25am with 30 min supraceliac clamp time, prolonged inter-renal clamp time, temporary abdominal closure. He did have bloody stool postop with flex sig 9/25 demonstrating ischemic mucosal changes of the rectum, repeated abdominal without evidence of transmural necrosis of the small or large intestine, or the rectum. On 9/29 he was started on CRRT, now on iHD. S/p closure of abdominal fascia and subcutaneous tissue wound VAC applied 10/2/23. 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 On 10/20/23 underwent abdominal incision closure.     10/24/23 found to have hgb 5.8, CTA demonstrated left psoas muscle hematoma now size 6 cm x 3 cm x 3 cm, barely seen on CTA from 10/13. Lesser sac of the peritoneal cavity collection stable, hematoma in left iliac stable, no active extravasation. Hep gtt held.     Overnight 10/24-10/25 patient more lethargic with fevers at 102.1 thought to be strokelike symptoms, stroke code called, workup negative. Hemoglobin dropped again to 5.8, despite holding heparin for 24 hours. Repeat CTA: demonstrated hematoma in LLE stump, with otherwise stable left iliac hematoma and stable left psoas muscle hematoma. Patient's hgb stable, he is progressing well with continued improvements in mental status, now more talkative and interactive, oriented more frequently and when family is around engaging in appropriate conversation.     Neuro:   - Alert, oriented now, improving daily. Episodes of delirium on/off.   - Previously had stroke code called for unequal pupils, c/f facial droop 10/6: workup revealed: Numerous, punctate late hyperacute to acute infarctions thought to be embolic in nature.  No embolic source identified in workup.  - Follow-up with neurology 4-6 weeks after  discharge, ok with a/c.   HEENT:   - ENT consult for ongoing dysphonia/dysphagia   - Endoscopy with L vocal cord paralysis   - Plan for bedside vocal fold injection 11/1 with ENT  CV:   - Midodrine 20mg  prn with HD runs   - Goal SBP <160, MAP >65  - Labetalol prn for SBP >160  - Continue statin  - PE+, venous duplex with nonocclusive to occlusive thrombus of the left GSV from the level of the knee to the groin and nonocclusive thrombus of the left distal femoral vein and popliteal veins, increased in extent compared to 9/30/2023.   - Heparin drip to low intensity heparin 10/28, will monitor hgb consider increase pending progress.   Resp:   - On room air  GI:   - Concern for Pancreatitis with peripancreatic fluid collection on CT 10/13 with question of bleeding into the collection. GI signed off as collection not drainable. - Improved appearance on repeat CT 10/25. Monitoring.    - TF tolerating via NJT  - Change abd dressing daily  - Failed SLP, failed video swallow eval, continue NPO with swallow exercises, note possible repeat VSS 10/31.   FEN:   - Electrolyte replacement prn   Renal:   - Appreciate nephrology recommendations  - Lasix challenge 10/28 with 700cc UOP + 1x unmeasured. 600ml since midnight.  - Next iHD planned for 10/31. Resopnse to diuretic challenge is promising. Still needs clearance.   - Tunneled line with IR placed 10/18/2023  Heme:   - Hemoglobin to 7.9 after increasing to low intensity hep gtt, will monitor at this time.     - DVT as above, PE   - Heparin drip at low intensity, will consider increasing to high intensity in coming days pending hgb.   ID:   - Fevers 102.1 on 10/25 at 4am. BCx negative  days, received 1 dose of cefepime and flagyl on 10/25, now off antibiotics with slightly improving leukocytosis. Patient has allergy to cefepime (hives). Continue to monitor for fevers, leukocytosis, hemodynamic stability.  - monitor signs of pneumonia  - nystatin swish for oral candida  - monitor  leukocytosis  MSK:   - L AKA 10/17/23: prevena applied 10/23/23, to stay on for 5 to 7 days  Derm:   - Has rash on abdomen, trunk, anterior bilateral thighs, erythematous, blanching, however maculopapular does not seem consistent with cellulitis.   - Pharm reviewed meds, all low risk, derm consulted, ID has seen as well, all felt were likely drug induced presumably from cefepime  - Did receive 1 dose cefepime 10/25 - this may be cause of worsening.  - Touched base with derm today on if any other recs for treatment/if concern for any other cause given worsening.  - Appreciate dermatology recs, likely morbilliform eruption vs DRESS (low risk)    - CBC with diff, will repeat 10/30   - LFTs, will repeat 10/30   - triamcinolone 0.1% changed back to BID scheduled 10/29 given worsening   Misc:   - Appreciate aggressive PT/OT ROM, patient severely deconditioned and requires extensive physical therapy.  - abd binder on at all times.  - please keep LLE AKA wrapped with ACE. Skin breaks okay  - IMC status  - Anticipate will require ARU at discharge    Discussed patient with vascular surgery staff who Is in agreement with the above.      Miryam Carrasco, [facility]  Vascular Surgery  Pager: [phone]  [email or web address]  Send message or 10 digit call back number Securely via [employer] with the [employer] Web Console (learn more here)

## 2023-10-30 NOTE — PROGRESS NOTES
Nephrology Progress Note  10/30/2023       Alfredo Burnham is a 68 yom with complex hx of TIA, HTN, blindness who presented to [facility] 9/24 with severe abdominal pain radiating to flank and back, CT revealed AAA with a contained rupture.  Taken to OR for aortobiiliac bypass and resection of ruptured pararenal aneurysm.   Post op course complicated by hypotension requiring pressors and metabolic acidosis.  Baseline Cr 1.0 on presentation but on the rise to >5 at time of renal consult for MAYA management and possible RRT.       Interval History :   Mr Burnham had past 2 days off of HD, considered run today on MWF schedule but there is no pressing issue in chemistries and UOP is picking up so can hold off again today.  Hopeful that he is in early recovery but may certainly need HD again, will evaluate tomorrow for possible run.      Assessment & Recommendations:   MAYA-Baseline Cr 1.0, ordered UA.  CT initially showed benign cyst but otherwise normal kidneys but subsequent imaging showed scattered areas of infarct related to emboli with AAA.  Cr on the rise since surgery, did receive contrast for CTA.  Urine microscopy showed granular casts suggesting ATN which will recover with time and stabilizing hemodynamics.  Started on CRRT 9/30 for volume and clearance with minimal UOP and rising Cr.  Transitioned to iHD on 10/5 and watching for recovery,       -Holding on run today.  Tentative plan for HD tomorrow.         -Appreciate IR placing tunneled line RIJ 10/18.                  -Dialysis consent signed and scanned into media tab.      -Infarcts seen on CT 10/13 lowers chances of recovery.          Volume-Pulled 1L on run yesterday while keeping SBP >100, also made 300cc of UOP.  Will give diuretic challenge today to see if we can augment and as prognostic for renal recovery.      Electrolytes-K 3.4, bicarb 23, Na 135.       BMD-Ca 8.6, Mg 2.4.  Phos 4.2, no issues.        Anemia-Hgb 7.9 and stable after bleeding  "early last week, likely iron replete after multiple PRBC's.   ~14 on presentation, acute management per team.       Nutrition-On Osmolite TF.       Time spent: 40 minutes on this date of encounter for chart review, physical exam, medical decision making and co-ordination of care.       Seen and discussed with Dr Foley    Recommendations were communicated to primary team via verbal communication.        ALTAGRACIA Jiménez CNS  Clinical Nurse Specialist  [phone]    Review of Systems:   I reviewed the following systems:  ROS not done due to lethargy    Physical Exam:   I/O last 3 completed shifts:  In: 1653.7 [I.V.:333.7; NG/GT:240]  Out: 1100 [Urine:1100]   BP (!) 152/97   Pulse 106   Temp 98  F (36.7  C) (Oral)   Resp 18   Ht 1.727 m (5' 8\")   Wt 71.8 kg (158 lb 4.6 oz)   SpO2 95%   BMI 24.07 kg/m       GENERAL APPEARANCE: Extubated, in no distress.   EYES: No scleral icterus  Pulmonary: On RA  CV: Regular rhythm, normal rate   - Edema +1-2 generalized, + scrotal edema.    GI: distended, nontender  MS: no evidence of inflammation in joints, no muscle tenderness  : No Lu  SKIN: no rash, warm, dry  NEURO: No focal deficits.         Labs:   All labs reviewed by me  Electrolytes/Renal -   Recent Labs   Lab Test 10/30/23  0651 10/29/23  0401 10/28/23  2346 10/28/23  0528 10/28/23  0525    135  --   --  134*   POTASSIUM 3.4 3.4  --   --  3.5   CHLORIDE 98 97*  --   --  95*   CO2 23 24  --   --  26   BUN 90.8* 73.7*  --   --  49.6*   CR 3.53* 3.29*  --   --  2.59*   * 117* 119*   < > 120*   OMAR 8.6* 8.3*  --   --  8.5*   MAG 2.4* 2.6*  --   --  1.9   PHOS 4.2 4.1  --   --  3.2    < > = values in this interval not displayed.       CBC -   Recent Labs   Lab Test 10/30/23  0651 10/29/23  0401 10/28/23  1541   WBC 9.0 8.6 10.1   HGB 7.9* 7.7* 7.8*   * 126* 130*       LFTs -   Recent Labs   Lab Test 10/30/23  0651 10/29/23  0401 10/28/23  0525   ALKPHOS 132* 128 133*   BILITOTAL 0.5 0.4 0.5 "   ALT 15 19 25   AST 24 25 34   PROTTOTAL 6.1* 5.8* 6.1*   ALBUMIN 3.1* 3.0* 3.1*       Iron Panel - No lab results found.        Current Medications:   acetaminophen  975 mg Oral or Feeding Tube Q6H    atorvastatin  10 mg Oral or Feeding Tube QPM    hydrocortisone   Topical BID    melatonin  1 mg Oral or Feeding Tube QPM    [START ON 10/31/2023] multivitamin RENAL  1 tablet Oral or Feeding Tube Daily    [START ON 10/31/2023] pantoprazole  40 mg Oral or Feeding Tube QAM    protein modular  1 packet Per Feeding Tube Daily    QUEtiapine  100 mg Oral or Feeding Tube At Bedtime    sodium chloride (PF)  10 mL Intravenous Once    triamcinolone   Topical BID      dextrose      dextrose Stopped (10/22/23 2106)    heparin 1,450 Units/hr (10/30/23 1100)

## 2023-10-30 NOTE — PLAN OF CARE
ICU End of Shift Summary. See flowsheets for vital signs and detailed assessment.    Changes this shift: More confused throughout the night. Did not sleep at all despite receiving all night meds and an atarax for itching. Frequent PVCs and PACs noted. Incontinent of stool, using urinal appropriately.     Plan: Continue to work towards rehab goals with mobility and swallowing.

## 2023-10-30 NOTE — PLAN OF CARE
ICU End of Shift Summary. See flowsheets for vital signs and detailed assessment.    Changes this shift: Pt mood labile, restless to lethargic. Oriented to self and location. Forgetful and impulsive, requires frequent reminders and redirection. Up to chair x2 today. Normothermic. ST, 100-120s with frequent ectopy. Maintaining O2 saturations on room air. NJ in place with TF at goal of 45mL/hr. Frequent urgency and hesitancy with urine and stool with frequent incontinence.    Wife at bedside.     Plan: Encourage activity and independence. Encourage swallow exercises. Transfer when bed becomes available. Contact primary care team with questions and concerns.     Goal Outcome Evaluation:      Plan of Care Reviewed With: patient    Overall Patient Progress: no changeOverall Patient Progress: no change    Outcome Evaluation: Intermittently confused. Vitally stable. TF at goal. Multiple incontinent urine/stool output.      Problem: Plan of Care - These are the overarching goals to be used throughout the patient stay.    Goal: Readiness for Transition of Care  10/30/2023 1852 by Andie Damon RN  Outcome: Not Progressing     Problem: Risk for Delirium  Goal: Optimal Coping  Outcome: Not Progressing     Problem: Risk for Delirium  Goal: Improved Behavioral Control  Outcome: Not Progressing

## 2023-10-31 ENCOUNTER — APPOINTMENT (OUTPATIENT)
Dept: SPEECH THERAPY | Facility: CLINIC | Age: 68
DRG: 268 | End: 2023-10-31
Payer: COMMERCIAL

## 2023-10-31 ENCOUNTER — APPOINTMENT (OUTPATIENT)
Dept: PHYSICAL THERAPY | Facility: CLINIC | Age: 68
DRG: 268 | End: 2023-10-31
Payer: COMMERCIAL

## 2023-10-31 ENCOUNTER — APPOINTMENT (OUTPATIENT)
Dept: OCCUPATIONAL THERAPY | Facility: CLINIC | Age: 68
DRG: 268 | End: 2023-10-31
Payer: COMMERCIAL

## 2023-10-31 LAB
ANION GAP SERPL CALCULATED.3IONS-SCNC: 12 MMOL/L (ref 7–15)
BUN SERPL-MCNC: 102 MG/DL (ref 8–23)
CALCIUM SERPL-MCNC: 8.9 MG/DL (ref 8.8–10.2)
CHLORIDE SERPL-SCNC: 100 MMOL/L (ref 98–107)
CREAT SERPL-MCNC: 3.38 MG/DL (ref 0.67–1.17)
DEPRECATED HCO3 PLAS-SCNC: 26 MMOL/L (ref 22–29)
EGFRCR SERPLBLD CKD-EPI 2021: 19 ML/MIN/1.73M2
ERYTHROCYTE [DISTWIDTH] IN BLOOD BY AUTOMATED COUNT: 15.9 % (ref 10–15)
GLUCOSE SERPL-MCNC: 118 MG/DL (ref 70–99)
HCT VFR BLD AUTO: 23.6 % (ref 40–53)
HGB BLD-MCNC: 7.7 G/DL (ref 13.3–17.7)
MAGNESIUM SERPL-MCNC: 2.4 MG/DL (ref 1.7–2.3)
MCH RBC QN AUTO: 30.4 PG (ref 26.5–33)
MCHC RBC AUTO-ENTMCNC: 32.6 G/DL (ref 31.5–36.5)
MCV RBC AUTO: 93 FL (ref 78–100)
PHOSPHATE SERPL-MCNC: 4.2 MG/DL (ref 2.5–4.5)
PLATELET # BLD AUTO: 158 10E3/UL (ref 150–450)
POTASSIUM SERPL-SCNC: 3.1 MMOL/L (ref 3.4–5.3)
RBC # BLD AUTO: 2.53 10E6/UL (ref 4.4–5.9)
SODIUM SERPL-SCNC: 138 MMOL/L (ref 135–145)
UFH PPP CHRO-ACNC: 0.39 IU/ML
UFH PPP CHRO-ACNC: 0.4 IU/ML
WBC # BLD AUTO: 7.9 10E3/UL (ref 4–11)

## 2023-10-31 PROCEDURE — 85027 COMPLETE CBC AUTOMATED: CPT

## 2023-10-31 PROCEDURE — 250N000013 HC RX MED GY IP 250 OP 250 PS 637

## 2023-10-31 PROCEDURE — 250N000011 HC RX IP 250 OP 636: Mod: JZ

## 2023-10-31 PROCEDURE — 97530 THERAPEUTIC ACTIVITIES: CPT | Mod: GP

## 2023-10-31 PROCEDURE — 250N000013 HC RX MED GY IP 250 OP 250 PS 637: Performed by: NURSE PRACTITIONER

## 2023-10-31 PROCEDURE — 97535 SELF CARE MNGMENT TRAINING: CPT | Mod: GO | Performed by: OCCUPATIONAL THERAPIST

## 2023-10-31 PROCEDURE — 85520 HEPARIN ASSAY: CPT | Performed by: SURGERY

## 2023-10-31 PROCEDURE — 92526 ORAL FUNCTION THERAPY: CPT | Mod: GN

## 2023-10-31 PROCEDURE — 36415 COLL VENOUS BLD VENIPUNCTURE: CPT | Performed by: SURGERY

## 2023-10-31 PROCEDURE — 999N000248 HC STATISTIC IV INSERT WITH US BY RN

## 2023-10-31 PROCEDURE — 99024 POSTOP FOLLOW-UP VISIT: CPT | Performed by: NURSE PRACTITIONER

## 2023-10-31 PROCEDURE — 80048 BASIC METABOLIC PNL TOTAL CA: CPT

## 2023-10-31 PROCEDURE — 250N000013 HC RX MED GY IP 250 OP 250 PS 637: Performed by: SURGERY

## 2023-10-31 PROCEDURE — 99232 SBSQ HOSP IP/OBS MODERATE 35: CPT | Performed by: CLINICAL NURSE SPECIALIST

## 2023-10-31 PROCEDURE — 83735 ASSAY OF MAGNESIUM: CPT

## 2023-10-31 PROCEDURE — 36415 COLL VENOUS BLD VENIPUNCTURE: CPT

## 2023-10-31 PROCEDURE — 84100 ASSAY OF PHOSPHORUS: CPT

## 2023-10-31 PROCEDURE — 120N000003 HC R&B IMCU UMMC

## 2023-10-31 PROCEDURE — 97530 THERAPEUTIC ACTIVITIES: CPT | Mod: GO | Performed by: OCCUPATIONAL THERAPIST

## 2023-10-31 RX ORDER — HYDROCORTISONE 25 MG/G
OINTMENT TOPICAL 2 TIMES DAILY
Status: DISCONTINUED | OUTPATIENT
Start: 2023-10-31 | End: 2023-11-14

## 2023-10-31 RX ORDER — QUETIAPINE FUMARATE 50 MG/1
50 TABLET, FILM COATED ORAL 2 TIMES DAILY
Status: DISCONTINUED | OUTPATIENT
Start: 2023-10-31 | End: 2023-11-11

## 2023-10-31 RX ORDER — HYDROXYZINE HYDROCHLORIDE 50 MG/1
50 TABLET, FILM COATED ORAL EVERY 6 HOURS PRN
Status: DISCONTINUED | OUTPATIENT
Start: 2023-10-31 | End: 2023-11-17 | Stop reason: HOSPADM

## 2023-10-31 RX ORDER — HYDROXYZINE HYDROCHLORIDE 25 MG/1
25 TABLET, FILM COATED ORAL EVERY 6 HOURS PRN
Status: DISCONTINUED | OUTPATIENT
Start: 2023-10-31 | End: 2023-11-17 | Stop reason: HOSPADM

## 2023-10-31 RX ORDER — LIDOCAINE HYDROCHLORIDE AND EPINEPHRINE 10; 10 MG/ML; UG/ML
5 INJECTION, SOLUTION INFILTRATION; PERINEURAL ONCE
Status: COMPLETED | OUTPATIENT
Start: 2023-10-31 | End: 2023-10-31

## 2023-10-31 RX ADMIN — HYDROXYZINE HYDROCHLORIDE 25 MG: 25 TABLET ORAL at 11:58

## 2023-10-31 RX ADMIN — QUETIAPINE FUMARATE 50 MG: 50 TABLET ORAL at 20:15

## 2023-10-31 RX ADMIN — ACETAMINOPHEN 975 MG: 325 TABLET, FILM COATED ORAL at 23:52

## 2023-10-31 RX ADMIN — QUETIAPINE FUMARATE 50 MG: 50 TABLET ORAL at 11:57

## 2023-10-31 RX ADMIN — Medication 1 TABLET: at 07:56

## 2023-10-31 RX ADMIN — HYDROXYZINE HYDROCHLORIDE 50 MG: 50 TABLET ORAL at 23:51

## 2023-10-31 RX ADMIN — CETIRIZINE HYDROCHLORIDE 5 MG: 5 TABLET ORAL at 19:02

## 2023-10-31 RX ADMIN — HYDROCORTISONE: 25 CREAM TOPICAL at 07:56

## 2023-10-31 RX ADMIN — Medication 40 MG: at 07:56

## 2023-10-31 RX ADMIN — QUETIAPINE FUMARATE 100 MG: 100 TABLET ORAL at 21:59

## 2023-10-31 RX ADMIN — HYDROCORTISONE: 25 CREAM TOPICAL at 20:15

## 2023-10-31 RX ADMIN — HYDROCORTISONE: 25 OINTMENT TOPICAL at 21:47

## 2023-10-31 RX ADMIN — ACETAMINOPHEN 975 MG: 325 TABLET, FILM COATED ORAL at 17:36

## 2023-10-31 RX ADMIN — QUETIAPINE FUMARATE 25 MG: 25 TABLET ORAL at 02:34

## 2023-10-31 RX ADMIN — TRIAMCINOLONE ACETONIDE: 1 OINTMENT TOPICAL at 20:15

## 2023-10-31 RX ADMIN — Medication: at 19:02

## 2023-10-31 RX ADMIN — TRIAMCINOLONE ACETONIDE: 1 OINTMENT TOPICAL at 07:56

## 2023-10-31 RX ADMIN — HEPARIN SODIUM 1450 UNITS/HR: 10000 INJECTION, SOLUTION INTRAVENOUS at 09:15

## 2023-10-31 RX ADMIN — ACETAMINOPHEN 975 MG: 325 TABLET, FILM COATED ORAL at 11:57

## 2023-10-31 RX ADMIN — HYDROXYZINE HYDROCHLORIDE 25 MG: 25 TABLET ORAL at 17:37

## 2023-10-31 RX ADMIN — OXYCODONE HYDROCHLORIDE 5 MG: 5 TABLET ORAL at 03:16

## 2023-10-31 RX ADMIN — ATORVASTATIN CALCIUM 10 MG: 10 TABLET, FILM COATED ORAL at 20:16

## 2023-10-31 RX ADMIN — ACETAMINOPHEN 975 MG: 325 TABLET, FILM COATED ORAL at 05:39

## 2023-10-31 RX ADMIN — Medication 1 MG: at 20:16

## 2023-10-31 ASSESSMENT — ACTIVITIES OF DAILY LIVING (ADL)
ADLS_ACUITY_SCORE: 47
ADLS_ACUITY_SCORE: 47
ADLS_ACUITY_SCORE: 53
ADLS_ACUITY_SCORE: 47
ADLS_ACUITY_SCORE: 53
ADLS_ACUITY_SCORE: 47
ADLS_ACUITY_SCORE: 47
ADLS_ACUITY_SCORE: 53
ADLS_ACUITY_SCORE: 47

## 2023-10-31 NOTE — PROGRESS NOTES
I visited Mr. Burnham and was able to visit with him and his wife yesterday.  Overall he is significantly clearer mentally each of the last 7 days.  I am encouraged by this, as well as by his recent increase in urine output over this time.  I am optimistic that his swallowing will improve and he is to undergo vocal cord injection this week. His hemoglobin is stable on low intensity heparin.  We will tentatively plan to increase this tomorrow.  We will continue his wound vac and wrapping the AKA site.  I am glad he has transitioned out of the ICU room (although he has been downgraded for nearly a week) to a quieter floor where we can focus on sleep hygiene and his physical therapy.

## 2023-10-31 NOTE — PROGRESS NOTES
"CLINICAL NUTRITION SERVICES - REASSESSMENT NOTE     Nutrition Prescription    Malnutrition Status:    Severe malnutrition in the context of acute illness     Recommendations already ordered by Registered Dietitian (RD):  Modify to a non-renal fiber-containing enteral formula as below:   TwoCal HN at 40 mL/hr (960 mL/day) + 1 pkt Banatrol TF + Expedite once daily to provide 2065 kcals (29 kcal/kg/day), 93 g PRO (1.3 g/kg/day), 672 mL H2O, 210 g CHO and 10 g Fiber daily.     Future/Additional Recommendations:  Monitor nutrition-related findings and follow pt per protocol  Monitor for diet advancement pending VFSS following vocal cord injection  Monitor for changes to stooling trends/frequency      EVALUATION OF THE PROGRESS TOWARD GOALS   Diet: NPO     Nutrition Support:   -Dosing weight: 72 kg (driest wt 9/24)    -EN access via NDT   -Recent regimen:   10/17-18, 10/19-10/25: Osmolite 1.5 Juvencio (or equivalent) at goal of  50ml/hr  (1200ml/day) + 1 pkt ProSource TF20 TID (3 pkts total) provides: 2040 kcals (28 kcal/kg), 135 g PRO (1.9 g pro/kg), 914 ml free H20, 244 g CHO, and 0 g fiber daily.   10/25 - present: Novasource Renal (or equivalent) at 45 ml/hr (1080 ml) +1 Prosource TF20 packet provides 2240 kcal (31 kcal/kg), 118 g pro (1.6 g/kg), 198 g CHO, 774 ml free water, and 0 g fiber daily.    -Modulars: Prosource TF20 once daily   -FWF 30 mL Q4hrs (180 mL/day) + 774 mL from TF for total free water provision of 954 mL/day    EN Intake - Average 7-day TF intake:1608 Kcals (22 Kcal/kg), and 76 g pro (1.1 g/kg). This is meeting 89% of low-end est Kcal needs, and 70% of low-end est protein needs.     NEW FINDINGS   General:  RD consult received today \"Patient with multiple stools, liquid/very soft. Please add fiber\". Pt transferred from ICU to [facility] overnight; pt new to writer and is due for a follow-up assessment tomorrow. Will complete follow-up assessment early, given request to modify EN regimen.    Per nephrology " "note: No HD today or for past 3-days. Cr starting to trend down on its own; possible removal of dialysis line in future but waiting 1-2 more days before considering.   SLP  brief note 10/31 \"Continue NPO with TF for nutrition/medications. Ice chips ok after good oral cares. Possible repeat VFSS after vocal fold injection with ENT.\"  Discussed changes to enteral feeds w/ pt and wife. No questions at this time. Family hopeful for diet advancement in future but continues NPO for now. Possible vocal cord injection tomorrow per chart.    GI:  2-8 unmeasured BMs per day per I/O since rectal tube removal     Weights:  Current weight matches admit wt; trends in-between have been variable (likely d/t fluctuation of fluid status) and variance of weight measures w/ bed scale differences.    Labs:  BG trends acceptable for acute care (acceptable BG range for hospital setting is 140-180 mg/dL per ADA for most patients)  Renal labs:  K+ 3.1 (L); PO4 4.4 (WNL)  BUN highly elevated and on upward trend (rise from  over past 6-days)   Cr also trending upwards; 2.94 --> 3.38    No dialysis x3 days; K+ low and PO4 WNL. Will modify pt off of renal formula.   Reduce protein goal to ~1.2-1.5 gm/kg/day given significant rise to BUN and dialysis held     Medications:  Renavite daily   Prosource TF20 daily     Skin:  Last WOCN note 10/26 - midline abdominal incision (initial assessment)   R heel PI (resolved); R cheek (bruise)   S/p AKA     MALNUTRITION  % Intake: < 75% for > 7 days (moderate)  % Weight Loss: None noted - Potentially confounded by fluid status but current weight = admit wt   Subcutaneous Fat Loss: Facial region:  moderate, Upper arm:  moderate, Lower arm:  mild, and Thoracic/intercostal:  mild -Copied from RD assess 10/25  Muscle Loss: Temporal:  moderate, Facial & jaw region:  mild, Upper arm (bicep, tricep):  moderate to severe, Lower arm  (forearm):  moderate, Dorsal hand:  mild, and Upper leg (quadricep, " hamstring):  mild -Copied from RD assess 10/25  Fluid Accumulation/Edema: Mild  Malnutrition Diagnosis: Severe malnutrition in the context of acute illness     Previous Goals   Total avg nutritional intake to meet a minimum of 25 kcal/kg and 1.5 g PRO/kg daily (per dosing wt 72 kg).   Evaluation: Not met    Previous Nutrition Diagnosis  Inadequate oral intake related to dysphagia as evidenced by NPO status.      Evaluation: No change    CURRENT NUTRITION DIAGNOSIS  Inadequate oral intake related to dysphagia as evidenced by NPO status.        INTERVENTIONS  Implementation  Collaboration with other providers - Bedside RN   Enteral Nutrition - Modify composition, concentration, and rate  Multivitamin/mineral supplement therapy - Discontinue MVI (RDI Covered with volume of TFs) but add Expedite supplement for wound healing   Nutrition education for recommended modifications - Reason for changes to TFs     Goals  Total avg nutritional intake to meet a minimum of 25 kcal/kg and 1.2 g PRO/kg daily (per dosing wt 72 kg).    Monitoring/Evaluation  Progress toward goals will be monitored and evaluated per protocol.    Donavan Cobb RDN, LD, CNSC  Available via phone or [employer] chat, and pager  [address]B work-room RD phone: *[phone]   [address]Obs RD pager: [phone]         Weekend/Holiday RD pager [phone]

## 2023-10-31 NOTE — CONSULTS
[facility] Inpatient Dermatology Progress Note    Assessment and Plan:       1. Morbilliform (Exanthematous) Drug Eruption, possibly 2/2 cefepime     Patient continues to have a persistent eruption on the trunk and extremities with morbilliform morphology, most consistent with a drug eruption.  The differential diagnosis would include a contact dermatitis.  Today, his AST and ALT are stable, his creatinine remains elevated but stable, and his eosinophilia is improving.  In the absence of palpable lymphadenopathy or facial swelling, the possibility of drug-induced hypersensitive syndrome (DHIS) is considered unlikely.  Recommend continuing to treat supportively with topical steroids; could also introduce wet wraps if the eruption continues to worsen.    Clinical impression of a diffuse erythematous eruption (onset ~10/13) is rather nonspecific but most frequently represents a viral exanthem or a morbilliform drug eruption. Dermpath review of left leg amputation with rash present 10/17/23 showed spongiotic dermatitis, which may be secondary to a concomitant contact dermatitis. Morbilliform or exanthematous drug eruptions usually manifest between 4 and 14 days after initiating a medication. The time to eruption may be shorter if the patient had previously been sensitized to the triggering medication (patient potentially with reaction to penicillins in the past). The most common culprits include antibiotics (penicillins and sulfas), allopurinol, anti-epileptics (phenytoin, barbiturates, carbamazepine), and NSAIDs, but many other drug culprits have been reported. In this patient, he has been on a few recent antibiotics including vancomycin (10/1-2), metronidazole (9/25-10/3, 10/25-), and cefepime (9/25-10/3, 10/14-15, restarted 10/25-). Cefepime would be the most suspicious culprit at this time.      For exanthematous drug eruptions, prompt withdrawal of the offending drug is the mainstay of  treatment. It may take an additional 1-2 weeks after stopping the medication before the eruption completely resolves. Drug-induced hypersensitivity syndrome (DIHS; previously DRESS or Drug Reaction with Eosinophilia and Systemic Symptoms) is less favored at this time given quick improvement of his rash and resolved transaminitis, although it would be important to continue trending his eosinophils.      Recommendations:  - recommend continuing to hold cefepime  -Given worsening of the eruption, would recommend applying triamcinolone 0.1% ointment twice daily  - Could consider wet wrap therapy if eruption continues to worsen  - continue daily CBC with diff & hepatic panel (trending eosinophils and LFTs)     Thank you for the dermatology consult. Please do not hesitate to contact the dermatology resident/faculty on call for any additional questions or concerns.      Staffed with attending physician, Dr. Bill Alas MD PGY-3  Dermatology Resident   I, Venus Khan MD, reviewed the medical record and photos of this patient with the resident and agree with the resident s findings and plan of care as documented in the resident s note.    Dermatology Problem List  1. Morbilliform (Exanthematous) Drug Eruption, possibly 2/2 cefepime  - Dermpath review of left leg amputation with rash present 10/17/23 showed spongiotic dermatitis.  - Onset ~10/13  - Elevated eos since 10/14/23; downtrending  - Recent antibiotics: vancomycin (10/1-2), metronidazole (9/25-10/3, 10/25-), cefepime (9/25-10/3, 10/14-15, 10/25-)    Interval history:    Patient was interviewed today with his wife at bedside.  He states that his skin is very itchy.  He feels like this has worsened recently.  The rash involves his chest, arms, right leg and left leg above the amputation site.  He does not feel like his face is swollen and has not noticed any swollen lymph nodes.    Medications:  Current Facility-Administered Medications  "  Medication    acetaminophen (TYLENOL) tablet 650 mg    acetaminophen (TYLENOL) tablet 975 mg    atorvastatin (LIPITOR) tablet 10 mg    benzocaine 20% (HURRICAINE/TOPEX) 20 % spray 0.5-1 mL    bisacodyl (DULCOLAX) suppository 10 mg    cetirizine (zyrTEC) tablet 5 mg    dextrose 10% infusion    dextrose 10% infusion    glucose gel 15-30 g    Or    dextrose 50 % injection 25-50 mL    Or    glucagon injection 1 mg    diphenhydrAMINE-zinc acetate (BENADRYL) 1-0.1 % cream    heparin infusion 25,000 units in D5W 250 mL ANTICOAGULANT    hydrALAZINE (APRESOLINE) injection 10-20 mg    hydrocortisone 2.5 % cream    HYDROmorphone (PF) (DILAUDID) injection 0.3 mg    labetalol (NORMODYNE/TRANDATE) injection 10-20 mg    lidocaine (XYLOCAINE) 2 % external gel    lidocaine (XYLOCAINE) 2 % external gel    melatonin tablet 1 mg    midodrine (PROAMATINE) tablet 20 mg    [START ON 10/31/2023] multivitamin RENAL (RENAVITE RX/NEPHROVITE) tablet 1 tablet    naloxone (NARCAN) injection 0.2 mg    Or    naloxone (NARCAN) injection 0.4 mg    Or    naloxone (NARCAN) injection 0.2 mg    Or    naloxone (NARCAN) injection 0.4 mg    ondansetron (ZOFRAN ODT) ODT tab 4 mg    Or    ondansetron (ZOFRAN) injection 4 mg    oxyCODONE (ROXICODONE) tablet 5-10 mg    [START ON 10/31/2023] pantoprazole (PROTONIX) 2 mg/mL suspension 40 mg    prochlorperazine (COMPAZINE) injection 5 mg    Or    prochlorperazine (COMPAZINE) tablet 5 mg    protein modular (PROSOURCE TF20) packet 1 packet    QUEtiapine (SEROquel) tablet 100 mg    QUEtiapine (SEROquel) tablet 25 mg    sodium chloride (PF) 0.9% PF flush 10 mL    sodium chloride (PF) 0.9% PF flush 10 mL    triamcinolone (KENALOG) 0.1 % ointment        Review of Systems:    See HPI    Physical exam:  BP (!) 145/91 (BP Location: Right arm)   Pulse 108   Temp 97.8  F (36.6  C) (Oral)   Resp 22   Ht 1.727 m (5' 8\")   Wt 71.8 kg (158 lb 4.6 oz)   SpO2 94%   BMI 24.07 kg/m    GEN:This is a well developed, " well-nourished male in no acute distress, in a pleasant mood.    SKIN: Focused examination of the face, chest, upper extremities, right lower extremity, left thigh was performed.  -Driscoll skin type: II  -On the chest, abdomen, upper extremities, right lower extremity and left thigh above the amputation site there are pink, minimally scaly papules coalescing into plaques in a morbilliform distribution  - On the bilateral cheeks there are confluent red/pink patches  - No appreciable facial swelling  - No occipital, posterior auricular, cervical, or clavicular lymphadenopathy                            Laboratory:  Results for orders placed or performed during the hospital encounter of 09/24/23 (from the past 24 hour(s))   CBC with Platelets & Differential    Narrative    The following orders were created for panel order CBC with Platelets & Differential.  Procedure                               Abnormality         Status                     ---------                               -----------         ------                     CBC with platelets and d...[[id number]]  Abnormal            Final result                 Please view results for these tests on the individual orders.   Basic metabolic panel   Result Value Ref Range    Sodium 135 135 - 145 mmol/L    Potassium 3.4 3.4 - 5.3 mmol/L    Chloride 98 98 - 107 mmol/L    Carbon Dioxide (CO2) 23 22 - 29 mmol/L    Anion Gap 14 7 - 15 mmol/L    Urea Nitrogen 90.8 (H) 8.0 - 23.0 mg/dL    Creatinine 3.53 (H) 0.67 - 1.17 mg/dL    GFR Estimate 18 (L) >60 mL/min/1.73m2    Calcium 8.6 (L) 8.8 - 10.2 mg/dL    Glucose 137 (H) 70 - 99 mg/dL   Magnesium   Result Value Ref Range    Magnesium 2.4 (H) 1.7 - 2.3 mg/dL   Phosphorus   Result Value Ref Range    Phosphorus 4.2 2.5 - 4.5 mg/dL   Hepatic panel   Result Value Ref Range    Protein Total 6.1 (L) 6.4 - 8.3 g/dL    Albumin 3.1 (L) 3.5 - 5.2 g/dL    Bilirubin Total 0.5 <=1.2 mg/dL    Alkaline Phosphatase 132 (H) 40 - 129 U/L     AST 24 0 - 45 U/L    ALT 15 0 - 70 U/L    Bilirubin Direct <0.20 0.00 - 0.30 mg/dL   Heparin Unfractionated Anti Xa Level   Result Value Ref Range    Anti Xa Unfractionated Heparin 0.37 For Reference Range, See Comment IU/mL    Narrative    Therapeutic Range: UFH: 0.25-0.50 IU/mL for low intensity dosing,  0.30-0.70 IU/mL for high intensity dosing DVT and PE.  This test is not validated for other direct factor X inhibitors (e.g. rivaroxaban, apixaban, edoxaban, betrixaban, fondaparinux) and should not be used for monitoring of other medications.   CBC with platelets and differential   Result Value Ref Range    WBC Count 9.0 4.0 - 11.0 10e3/uL    RBC Count 2.65 (L) 4.40 - 5.90 10e6/uL    Hemoglobin 7.9 (L) 13.3 - 17.7 g/dL    Hematocrit 24.7 (L) 40.0 - 53.0 %    MCV 93 78 - 100 fL    MCH 29.8 26.5 - 33.0 pg    MCHC 32.0 31.5 - 36.5 g/dL    RDW 15.8 (H) 10.0 - 15.0 %    Platelet Count 138 (L) 150 - 450 10e3/uL    % Neutrophils 67 %    % Lymphocytes 6 %    % Monocytes 7 %    % Eosinophils 19 %    % Basophils 0 %    % Immature Granulocytes 1 %    NRBCs per 100 WBC 0 <1 /100    Absolute Neutrophils 6.0 1.6 - 8.3 10e3/uL    Absolute Lymphocytes 0.5 (L) 0.8 - 5.3 10e3/uL    Absolute Monocytes 0.6 0.0 - 1.3 10e3/uL    Absolute Eosinophils 1.8 (H) 0.0 - 0.7 10e3/uL    Absolute Basophils 0.0 0.0 - 0.2 10e3/uL    Absolute Immature Granulocytes 0.1 <=0.4 10e3/uL    Absolute NRBCs 0.0 10e3/uL   Glucose by meter   Result Value Ref Range    GLUCOSE BY METER POCT 132 (H) 70 - 99 mg/dL   Glucose by meter   Result Value Ref Range    GLUCOSE BY METER POCT 122 (H) 70 - 99 mg/dL   Glucose by meter   Result Value Ref Range    GLUCOSE BY METER POCT 110 (H) 70 - 99 mg/dL       Staff Involved:  Resident/Staff

## 2023-10-31 NOTE — PLAN OF CARE
Goal Outcome Evaluation:      Plan of Care Reviewed With: patient    Overall Patient Progress: no changeOverall Patient Progress: no change    Outcome Evaluation: See RD note 10/31

## 2023-10-31 NOTE — PLAN OF CARE
Neuro: A&Ox4. Confusion overnight but none today. Whispers/hoarse voice due to vocal cord paralysis. Anxious/restless - Atarax and Seroquel given  Cardiac: ST. VSS. SBP 150s-160; DBP 90s, MAPs 110s. HR 100s.   Respiratory: Sating >93% on RA.  GI/: Adequate urine output. BM X1 - loose. Fiber added to TF orders per dietitian.  Diet/appetite: NPO with TF.  Activity:  Lift to chair/wheelchair. Up in chair and wheel chair half of day.  Pain: At acceptable level on current regimen.   Skin: No new deficits noted. C/o itching - allergy meds given.  LDA's: TF running through NJ at 45mL/hr with q4 30mL FWF. Wound vac ulta to L residual limb. Abd incision C/D/I with new dressing.    Plan: Continue with POC. Notify primary team with changes. Vocal cord procedure/treatment planned for tomorrow per Speech.      Goal Outcome Evaluation:    Problem: Plan of Care - These are the overarching goals to be used throughout the patient stay.    Goal: Plan of Care Review  Description: The Plan of Care Review/Shift note should be completed every shift.  The Outcome Evaluation is a brief statement about your assessment that the patient is improving, declining, or no change.  This information will be displayed automatically on your shift note.  Outcome: Progressing

## 2023-10-31 NOTE — PROGRESS NOTES
Transfer  Transferred from: 4c  Via:bed  Reason for transfer: Pt appropriate for 6B- improved patient condition  Family: Aware of transfer  Belongings: Received with pt  Chart: Received with pt  Medications: Meds received from old unit with pt  Code Status verified on armband: yes  2 RN Skin Assessment Completed By: ARACELY Arthur & ARACELY Martinez  Med rec completed: yes  Suction/Ambu bag/Flowmeter at bedside: yes    Report received from: ARACELY Liz  Pt status:   Pt. Resting comfortably in bed. Vitally stable. 2 RN skin check complete. Head to toe assessment completed -- see flowsheets for details. Call light is within reach.

## 2023-10-31 NOTE — PROGRESS NOTES
Vascular Surgery Progress Note  10/31/2023       Subjective:  Made 925 mL of UO yesterday, already 500 since midnight.  With multiple stools, on TF.  Patient transferred to  last night, intermediate care.  Hemoglobin stable this morning at 7.7, creatinine 3.38, . Resting comfortably, no acute events overnight.    Objective:  Temp:  [97.8  F (36.6  C)-98.2  F (36.8  C)] 98  F (36.7  C)  Pulse:  [100-112] 104  Resp:  [12-25] 20  BP: ()/() 153/96  SpO2:  [93 %-98 %] 96 %    I/O last 3 completed shifts:  In: 2069.5 [I.V.:304.5; NG/GT:685]  Out: 825 [Urine:825]      Gen: resting comfortably in bed, answering questions, whispering words, not in acute distress  CV: tachycardic per tele with PVCs  Resp: non-labored, on room air   Abd: Abdominal incision c/d/I. No active bleeding, abdomen soft, non-tender  Ext: RLE wwp, palpable DP pulse, LLE stump incision with general surrounding erythema similar to erythema of entire body, no obvious bleeding, soft today, incisional vac/prevena holding seal.    Prevena LLE placed on 10/30/23 - to be replaced in 5-7 days  Skin: Entire body with blanching erythema covering trunk arms, and legs.     Labs:  Recent Labs   Lab 10/31/23  0604 10/30/23  0651 10/29/23  0401   WBC 7.9 9.0 8.6   HGB 7.7* 7.9* 7.7*    138* 126*       Recent Labs   Lab 10/31/23  0604 10/30/23  2031 10/30/23  1553 10/30/23  1216 10/30/23  0651 10/29/23  0401     --   --   --  135 135   POTASSIUM 3.1*  --   --   --  3.4 3.4   CHLORIDE 100  --   --   --  98 97*   CO2 26  --   --   --  23 24   .0*  --   --   --  90.8* 73.7*   CR 3.38*  --   --   --  3.53* 3.29*   * 110* 122*   < > 137* 117*   OMAR 8.9  --   --   --  8.6* 8.3*   MAG 2.4*  --   --   --  2.4* 2.6*   PHOS 4.2  --   --   --  4.2 4.1    < > = values in this interval not displayed.                  Assessment/Plan:   68 year old male with PMH of HTN and previous TIA who presented with R sided abdominal pain found to  have contained ruptured AAA, now s/p open AAA repair 9/24 into 9/25am with 30 min supraceliac clamp time, prolonged inter-renal clamp time, temporary abdominal closure. He did have bloody stool postop with flex sig 9/25 demonstrating ischemic mucosal changes of the rectum, repeated abdominal without evidence of transmural necrosis of the small or large intestine, or the rectum. On 9/29 he was started on CRRT, now on iHD. S/p closure of abdominal fascia and subcutaneous tissue wound VAC applied 10/2/23. 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 On 10/20/23 underwent abdominal incision closure.     10/24/23 found to have hgb 5.8, CTA demonstrated left psoas muscle hematoma now size 6 cm x 3 cm x 3 cm, barely seen on CTA from 10/13. Lesser sac of the peritoneal cavity collection stable, hematoma in left iliac stable, no active extravasation. Hep gtt held.     Overnight 10/24-10/25 patient more lethargic with fevers at 102.1 thought to be strokelike symptoms, stroke code called, workup negative. Hemoglobin dropped again to 5.8, despite holding heparin for 24 hours. Repeat CTA: demonstrated hematoma in LLE stump, with otherwise stable left iliac hematoma and stable left psoas muscle hematoma. Patient's hgb stable, he is progressing well with continued slow improvements in mental status. Now transferred to , intermediate care.      Neuro:   - Alert, oriented now, improving daily. Episodes of delirium on/off.   - Previously had stroke code called for unequal pupils, c/f facial droop 10/6: workup revealed: Numerous, punctate late hyperacute to acute infarctions thought to be embolic in nature. No embolic source identified in workup.  - Follow-up with neurology 4-6 weeks after discharge, ok with a/c.   HEENT:   - ENT consult for ongoing dysphonia/dysphagia   - Endoscopy with L vocal cord paralysis   - Plan for bedside vocal fold injection 11/1 with ENT  CV:   - Midodrine 20mg  prn with HD runs   - Goal SBP <160, MAP >65  -  Labetalol prn for SBP >160  - Continue statin  - PE+, venous duplex with nonocclusive to occlusive thrombus of the left GSV from the level of the knee to the groin and nonocclusive thrombus of the left distal femoral vein and popliteal veins, increased in extent compared to 9/30/2023.   - Heparin drip to low intensity heparin 10/28, will monitor hgb consider increase in the coming days.   Resp:   - On room air  GI:   - Concern for Pancreatitis with peripancreatic fluid collection on CT 10/13 with question of bleeding into the collection. GI signed off as collection not drainable. - Improved appearance on repeat CT 10/25. Monitoring.    - TF tolerating via NJT  - Nutrition consult for adding more fiber or adjusting the TF formula, patient has multiple lose stools.  - Change abd dressing daily  - Failed SLP, failed video swallow eval, continue NPO with swallow exercises, note possible repeat VSS 10/31.   FEN:   - Electrolyte replacement prn   Renal:   - Appreciate nephrology recommendations  - Lasix challenge 10/28 with 700cc UOP + 1x unmeasured.  Continues to make good amount of urine, over 900 mL in the last 24 hours.  - Next iHD planned for 10/31. Resopnse to diuretic challenge is promising. Still needs clearance.   - Tunneled line with IR placed 10/18/2023  Heme:   - Hemoglobin stable after increasing to low intensity hep gtt, will monitor at this time.     - DVT as above, PE   - Heparin drip at low intensity, will consider increasing to high intensity in coming days pending hgb.   ID:   - Fevers 102.1 on 10/25 at 4am. BCx negative  days, received 1 dose of cefepime and flagyl on 10/25, now off antibiotics with slightly improving leukocytosis. Patient has allergy to cefepime (hives). Continue to monitor for fevers, leukocytosis, hemodynamic stability.  - monitor signs of pneumonia  - nystatin swish for oral candida  - monitor leukocytosis  MSK:   - L AKA 10/17/23: prevena applied 10/30/23, to stay on for 5 to 7  days  Derm:   - Has rash on abdomen, trunk, anterior bilateral thighs, erythematous, blanching, however maculopapular does not seem consistent with cellulitis.   - Pharm reviewed meds, all low risk, derm consulted, ID has seen as well, all felt were likely drug induced presumably from cefepime  - Did receive 1 dose cefepime 10/25 - this may be cause of worsening. May take 1-2 weeks to clear up.  - Appreciate dermatology recs, likely morbilliform eruption vs DRESS (low risk)    - CBC with diff, will repeat 10/30   - LFTs, will repeat 10/30   - triamcinolone 0.1% changed back to BID scheduled 10/29 given worsening   Misc:   - Appreciate aggressive PT/OT ROM, patient severely deconditioned and requires extensive physical therapy.  - abd binder on at all times.  - please keep LLE AKA wrapped with ACE. Skin breaks okay  - IMC status  - Anticipate will require ARU at discharge    Discussed patient with vascular surgery staff who Is in agreement with the above.      Miryam Carrasco CNP  Vascular Surgery  Pager: [phone]  [email or web address]  Send message or 10 digit call back number Securely via [employer] with the [employer] Web Console (learn more here)

## 2023-10-31 NOTE — PLAN OF CARE
Order received for restraints this AM - never initiated on patient due to improved mental status. Order discontinued and will need to be reordered if needed later.

## 2023-10-31 NOTE — PROGRESS NOTES
Transferred to: 6B Rm 27 at 2200  Status at time of transfer: Alert, oriented to person and place, heparin gtt infusing. Report given to 6B RN.  Belongings: with pt  Lu removed? (if no, why?): n/a  Chart and medications: with pt  Family notified:  no

## 2023-10-31 NOTE — PROGRESS NOTES
Nephrology Progress Note  10/31/2023       Alfredo Burnham is a 68 yom with complex hx of TIA, HTN, blindness who presented to [facility] 9/24 with severe abdominal pain radiating to flank and back, CT revealed AAA with a contained rupture.  Taken to OR for aortobiiliac bypass and resection of ruptured pararenal aneurysm.   Post op course complicated by hypotension requiring pressors and metabolic acidosis.  Baseline Cr 1.0 on presentation but on the rise to >5 at time of renal consult for MAYA management and possible RRT.       Interval History :   Mr Burnham had past 3 days off of HD and now Cr is down on its own for the 1st time.  Holding on HD and hopeful that he may be done altogether with needing RRT but will give another day or two before requesting pulling line.  K running low which is consistent with renal recovery, can replace or encourage PO intake.      Assessment & Recommendations:   MAYA-Baseline Cr 1.0, ordered UA.  CT initially showed benign cyst but otherwise normal kidneys but subsequent imaging showed scattered areas of infarct related to emboli with AAA.  Cr on the rise since surgery, did receive contrast for CTA.  Urine microscopy showed granular casts suggesting ATN which will recover with time and stabilizing hemodynamics.  Started on CRRT 9/30 for volume and clearance with minimal UOP and rising Cr.  Transitioned to iHD on 10/5 and watching for recovery,       -Holding on run today, Cr down for 1st time during course, likely in early recovery.       -Appreciate IR placing tunneled line RIJ 10/18.                  -Dialysis consent signed and scanned into media tab.      -Infarcts seen on CT 10/13 lowers chances of recovery.          Volume-Pulled 1L on run yesterday while keeping SBP >100, also made 300cc of UOP.  Will give diuretic challenge today to see if we can augment and as prognostic for renal recovery.      Electrolytes-K 3.1, bicarb 26, Na 138, K running low consistent with early  "recovery, can encourage PO intake or supplement.       BMD-Ca 8.9, Mg 2.4.  Phos 4.2, no issues.        Anemia-Hgb 7.7 and stable after bleeding early last week, likely iron replete after multiple PRBC's.   ~14 on presentation, acute management per team.       Nutrition-On Osmolite TF.       Time spent: 40 minutes on this date of encounter for chart review, physical exam, medical decision making and co-ordination of care.       Discussed with Dr Foley    Recommendations were communicated to primary team via verbal communication.        ALTAGRACIA Jiménez CNS  Clinical Nurse Specialist  [phone]    Review of Systems:   I reviewed the following systems:  ROS not done due to lethargy    Physical Exam:   I/O last 3 completed shifts:  In: 2069.5 [I.V.:304.5; NG/GT:685]  Out: 825 [Urine:825]   BP (!) 157/93 (BP Location: Right arm)   Pulse 106   Temp 98.4  F (36.9  C) (Oral)   Resp 18   Ht 1.727 m (5' 8\")   Wt 71.8 kg (158 lb 4.6 oz)   SpO2 94%   BMI 24.07 kg/m       GENERAL APPEARANCE: Sitting in chair, feeling well, in no distress.    EYES: No scleral icterus  Pulmonary: On RA  CV: Regular rhythm, normal rate   - Edema +1 generalized  GI: distended, nontender  MS: no evidence of inflammation in joints, no muscle tenderness  : No Lu  SKIN: no rash, warm, dry  NEURO: No focal deficits.         Labs:   All labs reviewed by me  Electrolytes/Renal -   Recent Labs   Lab Test 10/31/23  0604 10/30/23  2031 10/30/23  1553 10/30/23  1216 10/30/23  0651 10/29/23  0401     --   --   --  135 135   POTASSIUM 3.1*  --   --   --  3.4 3.4   CHLORIDE 100  --   --   --  98 97*   CO2 26  --   --   --  23 24   .0*  --   --   --  90.8* 73.7*   CR 3.38*  --   --   --  3.53* 3.29*   * 110* 122*   < > 137* 117*   OMAR 8.9  --   --   --  8.6* 8.3*   MAG 2.4*  --   --   --  2.4* 2.6*   PHOS 4.2  --   --   --  4.2 4.1    < > = values in this interval not displayed.       CBC -   Recent Labs   Lab Test " 10/31/23  0604 10/30/23  0651 10/29/23  0401   WBC 7.9 9.0 8.6   HGB 7.7* 7.9* 7.7*    138* 126*       LFTs -   Recent Labs   Lab Test 10/30/23  0651 10/29/23  0401 10/28/23  0525   ALKPHOS 132* 128 133*   BILITOTAL 0.5 0.4 0.5   ALT 15 19 25   AST 24 25 34   PROTTOTAL 6.1* 5.8* 6.1*   ALBUMIN 3.1* 3.0* 3.1*       Iron Panel - No lab results found.        Current Medications:   acetaminophen  975 mg Oral or Feeding Tube Q6H    atorvastatin  10 mg Oral or Feeding Tube QPM    hydrocortisone   Topical BID    melatonin  1 mg Oral or Feeding Tube QPM    multivitamin RENAL  1 tablet Oral or Feeding Tube Daily    pantoprazole  40 mg Oral or Feeding Tube QAM    protein modular  1 packet Per Feeding Tube Daily    QUEtiapine  100 mg Oral or Feeding Tube At Bedtime    QUEtiapine  50 mg Oral BID    sodium chloride (PF)  10 mL Intravenous Once    triamcinolone   Topical BID      dextrose      dextrose Stopped (10/22/23 2106)    heparin 1,450 Units/hr (10/31/23 0915)

## 2023-11-01 ENCOUNTER — APPOINTMENT (OUTPATIENT)
Dept: PHYSICAL THERAPY | Facility: CLINIC | Age: 68
DRG: 268 | End: 2023-11-01
Payer: COMMERCIAL

## 2023-11-01 LAB
ANION GAP SERPL CALCULATED.3IONS-SCNC: 15 MMOL/L (ref 7–15)
BUN SERPL-MCNC: 108 MG/DL (ref 8–23)
CALCIUM SERPL-MCNC: 9.1 MG/DL (ref 8.8–10.2)
CHLORIDE SERPL-SCNC: 102 MMOL/L (ref 98–107)
CREAT SERPL-MCNC: 3.17 MG/DL (ref 0.67–1.17)
DEPRECATED HCO3 PLAS-SCNC: 25 MMOL/L (ref 22–29)
EGFRCR SERPLBLD CKD-EPI 2021: 20 ML/MIN/1.73M2
ERYTHROCYTE [DISTWIDTH] IN BLOOD BY AUTOMATED COUNT: 15.9 % (ref 10–15)
GLUCOSE SERPL-MCNC: 118 MG/DL (ref 70–99)
HCT VFR BLD AUTO: 23.8 % (ref 40–53)
HGB BLD-MCNC: 7.6 G/DL (ref 13.3–17.7)
MAGNESIUM SERPL-MCNC: 2.5 MG/DL (ref 1.7–2.3)
MCH RBC QN AUTO: 29.9 PG (ref 26.5–33)
MCHC RBC AUTO-ENTMCNC: 31.9 G/DL (ref 31.5–36.5)
MCV RBC AUTO: 94 FL (ref 78–100)
PHOSPHATE SERPL-MCNC: 4.8 MG/DL (ref 2.5–4.5)
PLATELET # BLD AUTO: 162 10E3/UL (ref 150–450)
POTASSIUM SERPL-SCNC: 3 MMOL/L (ref 3.4–5.3)
RBC # BLD AUTO: 2.54 10E6/UL (ref 4.4–5.9)
SODIUM SERPL-SCNC: 142 MMOL/L (ref 135–145)
UFH PPP CHRO-ACNC: 0.44 IU/ML
WBC # BLD AUTO: 6.8 10E3/UL (ref 4–11)

## 2023-11-01 PROCEDURE — 97110 THERAPEUTIC EXERCISES: CPT | Mod: GP | Performed by: PHYSICAL THERAPIST

## 2023-11-01 PROCEDURE — L5999 LOWR EXTREMITY PROSTHES NOS: HCPCS

## 2023-11-01 PROCEDURE — 3E0F8GC INTRODUCTION OF OTHER THERAPEUTIC SUBSTANCE INTO RESPIRATORY TRACT, VIA NATURAL OR ARTIFICIAL OPENING ENDOSCOPIC: ICD-10-PCS | Performed by: OTOLARYNGOLOGY

## 2023-11-01 PROCEDURE — 250N000011 HC RX IP 250 OP 636: Performed by: NURSE PRACTITIONER

## 2023-11-01 PROCEDURE — L5695 AK SLEEVE SUSP NEOPRENE/EQUA: HCPCS

## 2023-11-01 PROCEDURE — 85027 COMPLETE CBC AUTOMATED: CPT

## 2023-11-01 PROCEDURE — 83735 ASSAY OF MAGNESIUM: CPT

## 2023-11-01 PROCEDURE — 36415 COLL VENOUS BLD VENIPUNCTURE: CPT

## 2023-11-01 PROCEDURE — 31574 LARGSC W/NJX AUGMENTATION: CPT | Mod: RT | Performed by: OTOLARYNGOLOGY

## 2023-11-01 PROCEDURE — 250N000013 HC RX MED GY IP 250 OP 250 PS 637: Performed by: NURSE PRACTITIONER

## 2023-11-01 PROCEDURE — 80048 BASIC METABOLIC PNL TOTAL CA: CPT

## 2023-11-01 PROCEDURE — 250N000009 HC RX 250

## 2023-11-01 PROCEDURE — G0463 HOSPITAL OUTPT CLINIC VISIT: HCPCS

## 2023-11-01 PROCEDURE — 250N000011 HC RX IP 250 OP 636: Mod: JZ

## 2023-11-01 PROCEDURE — L8460 SHRINKER ABOVE KNEE: HCPCS

## 2023-11-01 PROCEDURE — 250N000013 HC RX MED GY IP 250 OP 250 PS 637: Performed by: SURGERY

## 2023-11-01 PROCEDURE — L5460 POSTOP APP NON-WGT BEAR DSG: HCPCS

## 2023-11-01 PROCEDURE — 85520 HEPARIN ASSAY: CPT | Performed by: SURGERY

## 2023-11-01 PROCEDURE — 97530 THERAPEUTIC ACTIVITIES: CPT | Mod: GP | Performed by: PHYSICAL THERAPIST

## 2023-11-01 PROCEDURE — 84100 ASSAY OF PHOSPHORUS: CPT

## 2023-11-01 PROCEDURE — 99232 SBSQ HOSP IP/OBS MODERATE 35: CPT | Mod: FS | Performed by: CLINICAL NURSE SPECIALIST

## 2023-11-01 PROCEDURE — 120N000003 HC R&B IMCU UMMC

## 2023-11-01 PROCEDURE — 250N000013 HC RX MED GY IP 250 OP 250 PS 637

## 2023-11-01 RX ORDER — LIDOCAINE HYDROCHLORIDE AND EPINEPHRINE 10; 10 MG/ML; UG/ML
5 INJECTION, SOLUTION INFILTRATION; PERINEURAL ONCE
Status: DISCONTINUED | OUTPATIENT
Start: 2023-11-01 | End: 2023-11-07

## 2023-11-01 RX ORDER — LIDOCAINE HYDROCHLORIDE 40 MG/ML
5 SOLUTION TOPICAL EVERY 4 HOURS PRN
Status: DISCONTINUED | OUTPATIENT
Start: 2023-11-01 | End: 2023-11-07

## 2023-11-01 RX ORDER — POTASSIUM CHLORIDE 20MEQ/15ML
60 LIQUID (ML) ORAL ONCE
Status: COMPLETED | OUTPATIENT
Start: 2023-11-01 | End: 2023-11-01

## 2023-11-01 RX ADMIN — QUETIAPINE FUMARATE 50 MG: 50 TABLET ORAL at 19:09

## 2023-11-01 RX ADMIN — ATORVASTATIN CALCIUM 10 MG: 10 TABLET, FILM COATED ORAL at 19:09

## 2023-11-01 RX ADMIN — ACETAMINOPHEN 975 MG: 325 TABLET, FILM COATED ORAL at 12:15

## 2023-11-01 RX ADMIN — TRIAMCINOLONE ACETONIDE: 1 OINTMENT TOPICAL at 07:54

## 2023-11-01 RX ADMIN — LABETALOL HYDROCHLORIDE 10 MG: 5 INJECTION, SOLUTION INTRAVENOUS at 21:20

## 2023-11-01 RX ADMIN — OXYCODONE HYDROCHLORIDE 5 MG: 5 TABLET ORAL at 18:11

## 2023-11-01 RX ADMIN — Medication 1 MG: at 18:11

## 2023-11-01 RX ADMIN — HYDROXYZINE HYDROCHLORIDE 50 MG: 50 TABLET ORAL at 21:23

## 2023-11-01 RX ADMIN — ACETAMINOPHEN 975 MG: 325 TABLET, FILM COATED ORAL at 05:50

## 2023-11-01 RX ADMIN — HYDROCORTISONE: 25 OINTMENT TOPICAL at 19:09

## 2023-11-01 RX ADMIN — Medication 60 ML: at 08:00

## 2023-11-01 RX ADMIN — HEPARIN SODIUM 1450 UNITS/HR: 10000 INJECTION, SOLUTION INTRAVENOUS at 17:12

## 2023-11-01 RX ADMIN — HYDROCORTISONE: 25 OINTMENT TOPICAL at 07:53

## 2023-11-01 RX ADMIN — TRIAMCINOLONE ACETONIDE: 1 OINTMENT TOPICAL at 19:09

## 2023-11-01 RX ADMIN — LIDOCAINE HYDROCHLORIDE 5 ML: 40 SOLUTION TOPICAL at 18:05

## 2023-11-01 RX ADMIN — QUETIAPINE FUMARATE 100 MG: 100 TABLET ORAL at 21:23

## 2023-11-01 RX ADMIN — Medication 40 MG: at 07:53

## 2023-11-01 RX ADMIN — PHENYLEPHRINE HYDROCHLORIDE 5 ML: 10 INJECTION INTRAVENOUS at 17:54

## 2023-11-01 RX ADMIN — POTASSIUM CHLORIDE 60 MEQ: 1.5 SOLUTION ORAL at 11:18

## 2023-11-01 RX ADMIN — QUETIAPINE FUMARATE 50 MG: 50 TABLET ORAL at 07:53

## 2023-11-01 RX ADMIN — ACETAMINOPHEN 975 MG: 325 TABLET, FILM COATED ORAL at 17:06

## 2023-11-01 RX ADMIN — HEPARIN SODIUM 1450 UNITS/HR: 10000 INJECTION, SOLUTION INTRAVENOUS at 01:02

## 2023-11-01 ASSESSMENT — ACTIVITIES OF DAILY LIVING (ADL)
ADLS_ACUITY_SCORE: 49
ADLS_ACUITY_SCORE: 49
ADLS_ACUITY_SCORE: 51
ADLS_ACUITY_SCORE: 49
ADLS_ACUITY_SCORE: 51
ADLS_ACUITY_SCORE: 51
ADLS_ACUITY_SCORE: 49

## 2023-11-01 NOTE — PROGRESS NOTES
Nephrology Progress Note  11/01/2023       Alfredo Burnham is a 68 yom with complex hx of TIA, HTN, blindness who presented to [facility] 9/24 with severe abdominal pain radiating to flank and back, CT revealed AAA with a contained rupture.  Taken to OR for aortobiiliac bypass and resection of ruptured pararenal aneurysm.   Post op course complicated by hypotension requiring pressors and metabolic acidosis.  Baseline Cr 1.0 on presentation but on the rise to >5 at time of renal consult for MAYA management and possible RRT.       Interval History :   Mr Burnham had past 4 days off of HD, Cr down again today but only from 3.4=>3.2, UOP is robust and has hypokalemia typical for renal recovery.  Will hold on pulling line as Cr is only slightly better but will consider as his renal fx improves.  Can replace K, still with difficulty with speech and swallowing but overall stabilizing.      Assessment & Recommendations:   MAYA-Baseline Cr 1.0, ordered UA.  CT initially showed benign cyst but otherwise normal kidneys but subsequent imaging showed scattered areas of infarct related to emboli with AAA.  Cr on the rise since surgery, did receive contrast for CTA.  Urine microscopy showed granular casts suggesting ATN which will recover with time and stabilizing hemodynamics.  Started on CRRT 9/30 for volume and clearance with minimal UOP and rising Cr.  Transitioned to iHD on 10/5 and watching for recovery,       -Holding on run today, Cr down for 1st time during course, likely in early recovery.       -Appreciate IR placing tunneled line RIJ 10/18.                  -Dialysis consent signed and scanned into media tab.      -Infarcts seen on CT 10/13 lowers chances of recovery.          Volume-Pulled 1L on run yesterday while keeping SBP >100, also made 300cc of UOP.  Will give diuretic challenge today to see if we can augment and as prognostic for renal recovery.      Electrolytes-K 3.0, bicarb 25, Na 142, K running low  "consistent with early recovery, can encourage PO intake or supplement.       BMD-Ca 9.1, Mg 2.5.  Phos 4.8, no issues.        Anemia-Hgb 7.6 and stable after bleeding early last week, likely iron replete after multiple PRBC's.   ~14 on presentation, acute management per team.       Nutrition-On Osmolite TF.       Time spent: 40 minutes on this date of encounter for chart review, physical exam, medical decision making and co-ordination of care.       Discussed with Dr Foley    Recommendations were communicated to primary team via verbal communication.        ALTAGRACIA Jiménez CNS  Clinical Nurse Specialist  [phone]    Review of Systems:   I reviewed the following systems:  ROS not done due to lethargy    Physical Exam:   I/O last 3 completed shifts:  In: 1662.63 [I.V.:387.63; NG/GT:375]  Out: 1225 [Urine:1225]   BP (!) 152/91 (BP Location: Right arm)   Pulse 108   Temp 98.2  F (36.8  C) (Oral)   Resp 20   Ht 1.727 m (5' 8\")   Wt 71.8 kg (158 lb 4.6 oz)   SpO2 96%   BMI 24.07 kg/m       GENERAL APPEARANCE: Sitting in chair, feeling well, in no distress.    EYES: No scleral icterus  Pulmonary: On RA  CV: Regular rhythm, normal rate   - Edema +1 generalized  GI: distended, nontender  MS: no evidence of inflammation in joints, no muscle tenderness  : No Lu  SKIN: no rash, warm, dry  NEURO: No focal deficits.         Labs:   All labs reviewed by me  Electrolytes/Renal -   Recent Labs   Lab Test 11/01/23  0500 10/31/23  0604 10/30/23  2031 10/30/23  1216 10/30/23  0651    138  --   --  135   POTASSIUM 3.0* 3.1*  --   --  3.4   CHLORIDE 102 100  --   --  98   CO2 25 26  --   --  23   .0* 102.0*  --   --  90.8*   CR 3.17* 3.38*  --   --  3.53*   * 118* 110*   < > 137*   OMAR 9.1 8.9  --   --  8.6*   MAG 2.5* 2.4*  --   --  2.4*   PHOS 4.8* 4.2  --   --  4.2    < > = values in this interval not displayed.       CBC -   Recent Labs   Lab Test 11/01/23  0500 10/31/23  0604 10/30/23  0651 "   WBC 6.8 7.9 9.0   HGB 7.6* 7.7* 7.9*    158 138*       LFTs -   Recent Labs   Lab Test 10/30/23  0651 10/29/23  0401 10/28/23  0525   ALKPHOS 132* 128 133*   BILITOTAL 0.5 0.4 0.5   ALT 15 19 25   AST 24 25 34   PROTTOTAL 6.1* 5.8* 6.1*   ALBUMIN 3.1* 3.0* 3.1*       Iron Panel - No lab results found.        Current Medications:   acetaminophen  975 mg Oral or Feeding Tube Q6H    atorvastatin  10 mg Oral or Feeding Tube QPM    banatrol plus  1 packet Per Feeding Tube Daily at 10 am    hydrocortisone   Topical BID    lidocaine 1% with EPINEPHrine 1:100,000  5 mL Intradermal Once    lidocaine 3%, phenylephrine 0.25% solution for irrigation  5 mL Irrigation Once    melatonin  1 mg Oral or Feeding Tube QPM    pantoprazole  40 mg Oral or Feeding Tube QAM    QUEtiapine  100 mg Oral or Feeding Tube At Bedtime    QUEtiapine  50 mg Oral BID    triamcinolone   Topical BID    wound support modular  60 mL Per Feeding Tube Daily      dextrose      dextrose Stopped (10/22/23 2106)    heparin 1,450 Units/hr (11/01/23 0102)

## 2023-11-01 NOTE — CONSULTS
[facility] Nurse Inpatient Assessment     Consulted for: midline abdominal incision    Patient History (according to provider note(s):      68 year old male who was admitted to the SICU on 9/24/2023 s/p open AAA repair. Patient has a history of HTN and previous TIA. He presented to the ED on 9/24 with right sided abdominal pain and was found to have a contained, ruptured AAA on CT. 30 min super-celiac clamp time. Complicated hospital course with renal failure requiring IHD, stroke and PE requiring anticoagulation (currently held), now s/p multiple abdominal washouts with primary closure for AAA, and L AKA     Assessment:      Areas visualized during today's visit: Focused: and Abdomen  Wound location: midline incision     10/25, 10/26,11/1  Last photo: 11/1/23  Wound due to: Medical Adhesive Related Skin Injury (MARSI) vs drug eruption(see Derm note from 1025) vs unknown etiology.  not consistent with edema  Wound history/plan of care: Cefepime discontinued due to concern for Morbilliform drug eruption, possibly 2/2 cefepime.   Wound base: 100 % intact deflated blood filled blisters.  Largest approximately 1 cm in diameter.     Incison is well approximated with staples, no gapping, no erythema focused around the incision.       Palpation of the wound bed: normal      Drainage: none     Description of drainage: none     Periwound skin: Intact      Color: normal and consistent with surrounding tissue      Temperature: normal   Odor: none  Pain: denies ,   Pain interventions prior to dressing change: patient tolerated well  Treatment goal: Heal  and Protection  STATUS: healing and follow up  Supplies ordered: gathered and supplies stored on unit      Treatment Plan:     Midline incision blisters:  per vascular team     Orders: Written    RECOMMEND PRIMARY TEAM ORDER: None, at this time  Education provided: plan of care  Discussed plan of care with: Family and  Nurse  [facility] nurse follow-up plan: signing off  Notify WO if wound(s) deteriorate.  Nursing to notify the Provider(s) and re-consult the WO Nurse if new skin concern.    DATA:     Current support surface: Standard  Low air loss (FARSHAD pump, Isolibrium, Pulsate, skin guard, etc)  BMI: Body mass index is 24.07 kg/m .   Active diet order: Orders Placed This Encounter      NPO for Medical/Clinical Reasons Except for: Meds     Output: I/O last 3 completed shifts:  In: 1342.74 [I.V.:217.74; NG/GT:300]  Out: 1325 [Urine:1325]     Labs:   Recent Labs   Lab 11/01/23  0500 10/31/23  0604 10/30/23  0651   ALBUMIN  --   --  3.1*   HGB 7.6*   < > 7.9*   WBC 6.8   < > 9.0    < > = values in this interval not displayed.     Pressure injury risk assessment:   Sensory Perception: 3-->slightly limited  Moisture: 3-->occasionally moist  Activity: 2-->chairfast  Mobility: 2-->very limited  Nutrition: 3-->adequate  Friction and Shear: 2-->potential problem  Edson Score: 15    Pager no longer is use, please contact through [employer] group: [facility] Nurse Mcdonough  Dept. Office Number: [phone]

## 2023-11-01 NOTE — PROGRESS NOTES
S: Pt seen at [facility] room 6252 with wife present for fitting, delivery and instructions. O: I see the EPIC order for the Post op protector and supplies for LT AKA, I also see he is not alert but his wife is. I also see a wound vac present along the suture line. I see he is mid length AKA and is anticipated to be size 4 AK  once swelling has gone down. A: He was fit with a [employer] AK protector size large and the wife was shown how to only use the waist belt to don and doff the protector when used. The donning tube and  socks were also demonstrated for her and she seemed to understand. The amputee folder and bag were given with our business card for an future questions regarding prosthetics. P: [facility]. They were OK with me calling to check up on his progress. G: The only goal of the protector is to reduce injury to the fresh AKA if he were to fall directly on he end of it and only needs to be used when risk of falls is present.

## 2023-11-01 NOTE — OP NOTE
Operative Note   Otolaryngology - Head and Neck Surgery       DATE OF OPERATION:   November 1, 2023    PREOPERATIVE DIAGNOSIS:   Glottic insufficiency   Left vocal fold paralysis     POSTOPERATIVE DIAGNOSIS:   Same    NAME OF OPERATION:   Laryngoscopy with injection laryngoplasty of the left vocal fold with voice gel    ANESTHESIA  Type: local    SURGEON:   Kay Cobb MD    RESIDENT SURGEON(S):   Reggie Yuen MD    INDICATIONS FOR PROCEDURE:   The patient is a 68 year old male with glottic insufficiency due to vocal fold paralysis met at bedside in the hospital.   The risks, benefits and alternatives of the surgery were discussed. The patient wishes to proceed with surgery and has signed an informed consent.     FINDINGS:   Anatomic/physiological deviations: RNC, 1.5cc of voice gel via transthyrohyoid approach with good medialization   Right vocal process: No restriction of mobility   Left vocal process: Marked restriction of mobility  Glottal gap: Glottal gap  Supraglottic structures: Normal  Hypopharynx: Normal     DESCRIPTION OF PROCEDURE:   Then, the patient was seated upright. The areas of the nasopharynx as well as the hypopharynx were anesthetized with topical 4% lidocaine with 0.25% phenylephrine atomizer. The scope was then passed in the right or left nasal cavity, depending upon which side had the greater opening, and passed in through middle or the inferior meatus. Upon reaching the nasopharynx, the patient was instructed to breathe through the nasal cavity and the scope was passed inferiorly into the hypopharynx until the epiglottis was visualized. The scope was then passed beyond the epiglottis and both the brightness and focus were readjusted for maximum resolution. The vocal cords were visualized and the larynx was then carefully identified. An assessment of glottic closure was made. The chin of the patient was moved up and down to identify external landmarks including the cricoid, cricothyroid  membrane, superior notch of the thyroid cartilage, and the hyoid bone. These landmarks were palpated and, based upon this, a decision was made as to the position of the vocal cords in relationship to external landmarks. A 27-gauge needle was then secured to the augmentation syringe and air was removed from the needle. The needle was passed transcartilagenous or transmembranous into the vocal fold. The needle tips were then observed on the video monitor, care being taken not to perforate the membranous vocal cord. After correct needle placement was confirmed on the video monitor, augmentation agent was injected until the vocal cords were able to achieve optimal closure during adduction. During injection the patient s airway was carefully observed to maintain at least a 4 mm airway during injection. The injection was performed in 0.1 ml aliquots, the patient was asked to cough and to phonate  e  after each aliquot to dynamically assess the anatomic and acoustic effects of the injection. The vocal fold was over-injected by approximately 0.1 mg to account for resorption of the water content. Following maximal augmentation, the scope was withdrawn atraumatically.       COMPLICATIONS:   None.  .   ESTIMATED BLOOD LOSS:   Less than 10cc    DISPOSITION:   PACU.    SPECIMENS:  * No specimens in log *       PHOTODOCUMENTATION:

## 2023-11-01 NOTE — PLAN OF CARE
[id number]    Neuro: A&Ox4. Can be forgetful/impulsive but behavior overall appropriate throughout shift. Some anxiety/restlessness. Able to make needs known.   Cardiac:  Sinus/Sinus tach. VSS. S1, S2. Denies CP/SOB. Last Xa within range, next check tomorrow morning. No electrolyte replacements needed this shift.   Respiratory: RR even and nonlabored w sats 90%% on RA. Denies CP/SOB.   GI/: BM x 1 this shift. Loose, incontinent. Adequate UOP.  Diet/appetite: NPO- TF  Activity: NOOB this shift. Moves self in bed.    Pain: Mild pain this shift well controled w scheduled tylenol.   Skin:  No new skin integrity issues or concerns noted this shift.   LDA's: R HD CVC. L PIV w hep infusing at 1450 unit(s)/hr. ND w TF at 40 mL/hr w 30 mL FWF Q4.   Plan: No changes to current plan of care. Contact primary care team (VASCULAR SURGERY) w changes or concerns.       Temp: 98.2  F (36.8  C) Temp src: Oral BP: 138/69 Pulse: 113   Resp: 16 SpO2: 95 % O2 Device: None (Room air)         Intake/Output Summary (Last 24 hours) at 11/1/2023 0554  Last data filed at 11/1/2023 0500  Gross per 24 hour   Intake 1668.63 ml   Output 1075 ml   Net 593.63 ml      Recent Labs   Lab Test 11/01/23  0500 10/31/23  0604    138   POTASSIUM 3.0* 3.1*   CHLORIDE 102 100   CO2 25 26   ANIONGAP 15 12   * 118*   .0* 102.0*   CR 3.17* 3.38*   OMAR 9.1 8.9      CBC RESULTS:   Recent Labs   Lab Test 11/01/23  0500   WBC 6.8   RBC 2.54*   HGB 7.6*   HCT 23.8*   MCV 94   MCH 29.9   MCHC 31.9   RDW 15.9*

## 2023-11-01 NOTE — PROGRESS NOTES
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 The risks, benefits and alternatives of the surgery were discussed. The patient wishes to proceed with surgery and has signed an informed consent.     FINDINGS:   Anatomic/physiological deviations: none   Right vocal process: No restriction of mobility   Left vocal process: Marked restriction of mobility  Glottal gap: Glottal gap  Supraglottic structures: Normal  Hypopharynx: Normal     Post-op plan: Follow up in clinic in one month    DESCRIPTION OF PROCEDURE:   Then, the patient was seated upright. The areas of the nasopharynx as well as the hypopharynx were anesthetized with topical 4% lidocaine with 0.25% phenylephrine atomizer. The scope was then passed in the right or left nasal cavity, depending upon which side had the greater opening, and passed in through middle or the inferior meatus. Upon reaching the nasopharynx, the patient was instructed to breathe through the nasal cavity and the scope was passed inferiorly into the hypopharynx until the epiglottis was visualized. The scope was then passed beyond the epiglottis and both the brightness and focus were readjusted for maximum resolution. The vocal cords were visualized and the larynx was then carefully identified. An assessment of glottic closure was made. The chin of the patient was moved up and down to identify external landmarks including the cricoid, cricothyroid membrane, superior notch of the thyroid cartilage, and the hyoid  bone. These landmarks were palpated and, based upon this, a decision was made as to the position of the vocal cords in relationship to external landmarks. A 27-gauge needle was then secured to the augmentation syringe and air was removed from the needle. The needle was passed transcartilagenous or transmembranous into the vocal fold. The needle tips were then observed on the video monitor, care being taken not to perforate the membranous vocal cord. After correct needle placement was confirmed on the video monitor, augmentation agent was injected until the vocal cords were able to achieve optimal closure during adduction. During injection the patient s airway was carefully observed to maintain at least a 4 mm airway during injection. Following maximal augmentation, the scope was withdrawn atraumatically.       COMPLICATIONS:   None.  .   ESTIMATED BLOOD LOSS:   Less than 5cc    DISPOSITION:   Per Primary.

## 2023-11-01 NOTE — PROGRESS NOTES
Vascular Surgery Progress Note  11/01/2023       Subjective:  Renal function continues to improve, creatinine this morning down to 3.17, BUN at 108, patient made 1.1L UO in the last 24 hours and 550ml since midnight.  Hemoglobin stable at 7.6.  Occasional anxiety restlessness overnight, able to sleep better.  Patient this morning alert and oriented x3, appropriate, denies pain.    Objective:  Temp:  [98  F (36.7  C)-98.6  F (37  C)] 98.6  F (37  C)  Pulse:  [104-113] 110  Resp:  [14-20] 20  BP: (106-164)/() 141/78  Cuff Mean (mmHg):  [] 98  SpO2:  [94 %-96 %] 96 %    I/O last 3 completed shifts:  In: 1662.63 [I.V.:387.63; NG/GT:375]  Out: 1225 [Urine:1225]      Gen: resting comfortably in bed, answering questions appropriately, no delirium/confusion, whispering words, not in acute distress  CV: tachycardic per tele with PVCs  Resp: non-labored, on room air   Abd: Abdominal incision c/d/I. No active bleeding, abdomen soft, non-tender  Ext: RLE wwp, palpable DP pulse, LLE stump incision with improved surrounding erythema similar to erythema of entire body, no obvious bleeding, soft, incisional vac/prevena holding seal.    Prevena LLE placed on 10/30/23 - to be replaced in 5-7 days  Skin: Entire body with blanching erythema covering trunk arms, and legs.     Labs:  Recent Labs   Lab 11/01/23  0500 10/31/23  0604 10/30/23  0651   WBC 6.8 7.9 9.0   HGB 7.6* 7.7* 7.9*    158 138*       Recent Labs   Lab 11/01/23  0500 10/31/23  0604 10/30/23  2031 10/30/23  1216 10/30/23  0651    138  --   --  135   POTASSIUM 3.0* 3.1*  --   --  3.4   CHLORIDE 102 100  --   --  98   CO2 25 26  --   --  23   .0* 102.0*  --   --  90.8*   CR 3.17* 3.38*  --   --  3.53*   * 118* 110*   < > 137*   OMAR 9.1 8.9  --   --  8.6*   MAG 2.5* 2.4*  --   --  2.4*   PHOS 4.8* 4.2  --   --  4.2    < > = values in this interval not displayed.                  Assessment/Plan:   Alfredo Musa Padmamurali is a 68 year old  male with PMH of HTN and previous TIA who presented with R sided abdominal pain found to have contained ruptured AAA, now s/p open AAA repair 9/24 into 9/25am with 30 min supraceliac clamp time, prolonged inter-renal clamp time, temporary abdominal closure. He did have bloody stool postop with flex sig 9/25 demonstrating ischemic mucosal changes of the rectum, repeated abdominal without evidence of transmural necrosis of the small or large intestine, or the rectum. On 9/29 he was started on CRRT, now on iHD. S/p closure of abdominal fascia and subcutaneous tissue wound VAC applied 10/2/23. 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 On 10/20/23 underwent abdominal incision closure.     10/24/23 found to have hgb 5.8, CTA demonstrated left psoas muscle hematoma now size 6 cm x 3 cm x 3 cm, barely seen on CTA from 10/13. Lesser sac of the peritoneal cavity collection stable, hematoma in left iliac stable, no active extravasation. Hep gtt held.     Overnight 10/24-10/25 patient more lethargic with fevers at 102.1 thought to be strokelike symptoms, stroke code called, workup negative. Hemoglobin dropped again to 5.8, despite holding heparin for 24 hours. Repeat CTA: demonstrated hematoma in LLE stump, with otherwise stable left iliac hematoma and stable left psoas muscle hematoma. Patient's hgb stable, he is progressing well with continued improvements in mental status, this morning patient is alert and oriented x3, without confusion or delirium.    Neuro:   - Alert, oriented now, improving daily.  - Seroquel at bedtime and as needed for anxiety/delirium episodes  - Previously had stroke code called for unequal pupils, c/f facial droop 10/6: workup revealed: Numerous, punctate late hyperacute to acute infarctions thought to be embolic in nature. No embolic source identified in workup.  - Follow-up with neurology 4-6 weeks after discharge, ok with a/c.   HEENT:   - ENT consult for ongoing dysphonia/dysphagia   - Endoscopy with L  vocal cord paralysis   - Plan for bedside vocal fold injection 11/1 with ENT, no need to hold heparin gtt per ENT team.    CV:   - Midodrine 20mg  prn with HD runs   - Goal SBP <160, MAP >65  - Labetalol prn for SBP >160  - Continue statin  - PE+, venous duplex with nonocclusive to occlusive thrombus of the left GSV from the level of the knee to the groin and nonocclusive thrombus of the left distal femoral vein and popliteal veins, increased in extent compared to 9/30/2023.   - Heparin drip to low intensity heparin 10/28, with therapeutic anti-Xa, no plan to change to high intensity, appropriately anticoagulated.  Resp:   - On room air  GI:   - Concern for Pancreatitis with peripancreatic fluid collection on CT 10/13 with question of bleeding into the collection. GI signed off as collection not drainable. - Improved appearance on repeat CT 10/25. Monitoring.    - TF tolerating via NJT, formula changed yesterday due to multiple BMs, added fiber to formula.  - Nutrition following, appreciate recommendations.  - Change abd dressing daily  - Failed SLP, failed video swallow eval, continue NPO with swallow exercises.   FEN:   - Electrolyte replacement prn   Renal:   - Appreciate nephrology recommendations  - Lasix challenge 10/28 with 700cc UOP + 1x unmeasured.  Continues to make good amount of urine, over 1.1L in the last 24 hours.   - Holding iHD for now, with renal function improvement for 2 consecutive days. Response to diuretic challenge is promising. Still needs clearance.   - Tunneled line with IR placed 10/18/2023  Heme:   - Hemoglobin stable after increasing to low intensity hep gtt, will monitor at this time.     - DVT as above, PE   - Heparin drip at low intensity, with therapeutic anti-Xa 0.4. No plan to transition to high intensity, appropriately anticoagulated.  ID:   -No further fevers, last fever 102.1 on 10/25 at 4am. BCx negative, received 1 dose of cefepime and flagyl on 10/25, now off antibiotics  with slightly improving leukocytosis. Patient has allergy to cefepime (hives). Continue to monitor for fevers, leukocytosis, hemodynamic stability.  - monitor signs of pneumonia  - nystatin swish for oral candida  - monitor leukocytosis  MSK:   - L AKA 10/17/23: prevena applied 10/30/23, to stay on for 5 to 7 days  - Orthotics consult for stump protector and   Derm:   - Has rash on abdomen, trunk, anterior bilateral thighs, erythematous, blanching, however maculopapular does not seem consistent with cellulitis.   - Pharm reviewed meds, all low risk, derm consulted, ID has seen as well, all felt were likely drug induced presumably from cefepime  - Did receive 1 dose cefepime 10/25 - this may be cause of worsening. May take 1-2 weeks to clear up.  - Appreciate dermatology recs, likely morbilliform eruption vs DRESS (low risk)    - CBC with diff, will repeat 10/30   - LFTs, will repeat 10/30   - triamcinolone 0.1% changed back to BID scheduled 10/29 given worsening   Misc:   - Appreciate aggressive PT/OT ROM, patient severely deconditioned and requires extensive physical therapy.  - Abd binder on at all times.  - please keep LLE AKA wrapped with ACE  - IMC status  - Anticipate will require ARU at discharge    Discussed patient with vascular surgery staff who Is in agreement with the above.      Miryam Carrasco CNP  Vascular Surgery  Pager: [phone]  [email or web address]  Send message or 10 digit call back number Securely via [employer] with the Vocera Web Console (learn more here)

## 2023-11-02 ENCOUNTER — APPOINTMENT (OUTPATIENT)
Dept: SPEECH THERAPY | Facility: CLINIC | Age: 68
DRG: 268 | End: 2023-11-02
Payer: COMMERCIAL

## 2023-11-02 ENCOUNTER — APPOINTMENT (OUTPATIENT)
Dept: GENERAL RADIOLOGY | Facility: CLINIC | Age: 68
DRG: 268 | End: 2023-11-02
Attending: NURSE PRACTITIONER
Payer: COMMERCIAL

## 2023-11-02 ENCOUNTER — APPOINTMENT (OUTPATIENT)
Dept: INTERVENTIONAL RADIOLOGY/VASCULAR | Facility: CLINIC | Age: 68
DRG: 268 | End: 2023-11-02
Attending: CLINICAL NURSE SPECIALIST
Payer: COMMERCIAL

## 2023-11-02 ENCOUNTER — APPOINTMENT (OUTPATIENT)
Dept: PHYSICAL THERAPY | Facility: CLINIC | Age: 68
DRG: 268 | End: 2023-11-02
Payer: COMMERCIAL

## 2023-11-02 ENCOUNTER — APPOINTMENT (OUTPATIENT)
Dept: OCCUPATIONAL THERAPY | Facility: CLINIC | Age: 68
DRG: 268 | End: 2023-11-02
Payer: COMMERCIAL

## 2023-11-02 LAB
ANION GAP SERPL CALCULATED.3IONS-SCNC: 14 MMOL/L (ref 7–15)
BUN SERPL-MCNC: 101 MG/DL (ref 8–23)
CALCIUM SERPL-MCNC: 9.4 MG/DL (ref 8.8–10.2)
CHLORIDE SERPL-SCNC: 105 MMOL/L (ref 98–107)
CREAT SERPL-MCNC: 2.84 MG/DL (ref 0.67–1.17)
DEPRECATED HCO3 PLAS-SCNC: 23 MMOL/L (ref 22–29)
EGFRCR SERPLBLD CKD-EPI 2021: 23 ML/MIN/1.73M2
ERYTHROCYTE [DISTWIDTH] IN BLOOD BY AUTOMATED COUNT: 16.2 % (ref 10–15)
GLUCOSE SERPL-MCNC: 137 MG/DL (ref 70–99)
HCT VFR BLD AUTO: 26.4 % (ref 40–53)
HGB BLD-MCNC: 7 G/DL (ref 13.3–17.7)
HGB BLD-MCNC: 8.2 G/DL (ref 13.3–17.7)
IRON BINDING CAPACITY (ROCHE): 168 UG/DL (ref 240–430)
IRON SATN MFR SERPL: 35 % (ref 15–46)
IRON SERPL-MCNC: 59 UG/DL (ref 61–157)
MAGNESIUM SERPL-MCNC: 2.4 MG/DL (ref 1.7–2.3)
MCH RBC QN AUTO: 29.6 PG (ref 26.5–33)
MCHC RBC AUTO-ENTMCNC: 31.1 G/DL (ref 31.5–36.5)
MCV RBC AUTO: 95 FL (ref 78–100)
PHOSPHATE SERPL-MCNC: 4.7 MG/DL (ref 2.5–4.5)
PLATELET # BLD AUTO: 183 10E3/UL (ref 150–450)
POTASSIUM SERPL-SCNC: 4.2 MMOL/L (ref 3.4–5.3)
RBC # BLD AUTO: 2.77 10E6/UL (ref 4.4–5.9)
SODIUM SERPL-SCNC: 142 MMOL/L (ref 135–145)
UFH PPP CHRO-ACNC: 0.35 IU/ML
WBC # BLD AUTO: 8.2 10E3/UL (ref 4–11)

## 2023-11-02 PROCEDURE — 36415 COLL VENOUS BLD VENIPUNCTURE: CPT | Performed by: NURSE PRACTITIONER

## 2023-11-02 PROCEDURE — 99232 SBSQ HOSP IP/OBS MODERATE 35: CPT | Performed by: CLINICAL NURSE SPECIALIST

## 2023-11-02 PROCEDURE — 250N000013 HC RX MED GY IP 250 OP 250 PS 637: Performed by: SURGERY

## 2023-11-02 PROCEDURE — 85027 COMPLETE CBC AUTOMATED: CPT

## 2023-11-02 PROCEDURE — 74230 X-RAY XM SWLNG FUNCJ C+: CPT | Mod: 26 | Performed by: STUDENT IN AN ORGANIZED HEALTH CARE EDUCATION/TRAINING PROGRAM

## 2023-11-02 PROCEDURE — 83735 ASSAY OF MAGNESIUM: CPT

## 2023-11-02 PROCEDURE — 36589 REMOVAL TUNNELED CV CATH: CPT | Performed by: PHYSICIAN ASSISTANT

## 2023-11-02 PROCEDURE — 84100 ASSAY OF PHOSPHORUS: CPT

## 2023-11-02 PROCEDURE — 97530 THERAPEUTIC ACTIVITIES: CPT | Mod: GO

## 2023-11-02 PROCEDURE — 85018 HEMOGLOBIN: CPT | Performed by: NURSE PRACTITIONER

## 2023-11-02 PROCEDURE — 80048 BASIC METABOLIC PNL TOTAL CA: CPT

## 2023-11-02 PROCEDURE — 92526 ORAL FUNCTION THERAPY: CPT | Mod: GN

## 2023-11-02 PROCEDURE — 36589 REMOVAL TUNNELED CV CATH: CPT

## 2023-11-02 PROCEDURE — 83550 IRON BINDING TEST: CPT | Performed by: CLINICAL NURSE SPECIALIST

## 2023-11-02 PROCEDURE — 85520 HEPARIN ASSAY: CPT | Performed by: SURGERY

## 2023-11-02 PROCEDURE — 250N000013 HC RX MED GY IP 250 OP 250 PS 637

## 2023-11-02 PROCEDURE — 250N000011 HC RX IP 250 OP 636: Performed by: NURSE PRACTITIONER

## 2023-11-02 PROCEDURE — 92611 MOTION FLUOROSCOPY/SWALLOW: CPT | Mod: GN

## 2023-11-02 PROCEDURE — 250N000013 HC RX MED GY IP 250 OP 250 PS 637: Performed by: NURSE PRACTITIONER

## 2023-11-02 PROCEDURE — 97530 THERAPEUTIC ACTIVITIES: CPT | Mod: GP

## 2023-11-02 PROCEDURE — 74230 X-RAY XM SWLNG FUNCJ C+: CPT

## 2023-11-02 PROCEDURE — 120N000003 HC R&B IMCU UMMC

## 2023-11-02 PROCEDURE — 99024 POSTOP FOLLOW-UP VISIT: CPT | Performed by: NURSE PRACTITIONER

## 2023-11-02 PROCEDURE — 250N000011 HC RX IP 250 OP 636: Performed by: STUDENT IN AN ORGANIZED HEALTH CARE EDUCATION/TRAINING PROGRAM

## 2023-11-02 PROCEDURE — 36415 COLL VENOUS BLD VENIPUNCTURE: CPT

## 2023-11-02 PROCEDURE — 250N000011 HC RX IP 250 OP 636: Mod: JZ

## 2023-11-02 RX ORDER — AMLODIPINE BESYLATE 5 MG/1
5 TABLET ORAL DAILY
Status: DISCONTINUED | OUTPATIENT
Start: 2023-11-02 | End: 2023-11-06

## 2023-11-02 RX ORDER — BARIUM SULFATE 400 MG/ML
SUSPENSION ORAL ONCE
Status: COMPLETED | OUTPATIENT
Start: 2023-11-02 | End: 2023-11-02

## 2023-11-02 RX ADMIN — OXYCODONE HYDROCHLORIDE 5 MG: 5 TABLET ORAL at 07:17

## 2023-11-02 RX ADMIN — HYDROCORTISONE: 25 OINTMENT TOPICAL at 20:00

## 2023-11-02 RX ADMIN — QUETIAPINE FUMARATE 50 MG: 50 TABLET ORAL at 09:44

## 2023-11-02 RX ADMIN — HYDROXYZINE HYDROCHLORIDE 50 MG: 50 TABLET ORAL at 03:14

## 2023-11-02 RX ADMIN — ACETAMINOPHEN 975 MG: 325 TABLET, FILM COATED ORAL at 00:07

## 2023-11-02 RX ADMIN — ACETAMINOPHEN 975 MG: 325 TABLET, FILM COATED ORAL at 23:45

## 2023-11-02 RX ADMIN — HEPARIN SODIUM 1450 UNITS/HR: 10000 INJECTION, SOLUTION INTRAVENOUS at 09:39

## 2023-11-02 RX ADMIN — AMLODIPINE BESYLATE 5 MG: 5 TABLET ORAL at 13:44

## 2023-11-02 RX ADMIN — ACETAMINOPHEN 975 MG: 325 TABLET, FILM COATED ORAL at 17:56

## 2023-11-02 RX ADMIN — BARIUM SULFATE 60 ML: 400 SUSPENSION ORAL at 13:15

## 2023-11-02 RX ADMIN — LABETALOL HYDROCHLORIDE 20 MG: 5 INJECTION, SOLUTION INTRAVENOUS at 04:45

## 2023-11-02 RX ADMIN — OXYCODONE HYDROCHLORIDE 5 MG: 5 TABLET ORAL at 03:13

## 2023-11-02 RX ADMIN — HYDROXYZINE HYDROCHLORIDE 50 MG: 50 TABLET ORAL at 09:44

## 2023-11-02 RX ADMIN — ATORVASTATIN CALCIUM 10 MG: 10 TABLET, FILM COATED ORAL at 19:59

## 2023-11-02 RX ADMIN — QUETIAPINE FUMARATE 100 MG: 100 TABLET ORAL at 22:50

## 2023-11-02 RX ADMIN — HYDROCORTISONE: 25 OINTMENT TOPICAL at 09:44

## 2023-11-02 RX ADMIN — TRIAMCINOLONE ACETONIDE: 1 OINTMENT TOPICAL at 09:44

## 2023-11-02 RX ADMIN — ACETAMINOPHEN 975 MG: 325 TABLET, FILM COATED ORAL at 05:48

## 2023-11-02 RX ADMIN — Medication 40 MG: at 09:43

## 2023-11-02 RX ADMIN — Medication 60 ML: at 09:53

## 2023-11-02 RX ADMIN — PROCHLORPERAZINE EDISYLATE 5 MG: 5 INJECTION INTRAMUSCULAR; INTRAVENOUS at 17:57

## 2023-11-02 RX ADMIN — Medication 1 PACKET: at 09:53

## 2023-11-02 RX ADMIN — TRIAMCINOLONE ACETONIDE: 1 OINTMENT TOPICAL at 20:09

## 2023-11-02 RX ADMIN — ACETAMINOPHEN 975 MG: 325 TABLET, FILM COATED ORAL at 12:37

## 2023-11-02 RX ADMIN — Medication: at 01:35

## 2023-11-02 RX ADMIN — Medication 1 MG: at 17:57

## 2023-11-02 RX ADMIN — QUETIAPINE FUMARATE 50 MG: 50 TABLET ORAL at 19:59

## 2023-11-02 ASSESSMENT — ACTIVITIES OF DAILY LIVING (ADL)
ADLS_ACUITY_SCORE: 49
ADLS_ACUITY_SCORE: 47
ADLS_ACUITY_SCORE: 49
ADLS_ACUITY_SCORE: 45
ADLS_ACUITY_SCORE: 49
ADLS_ACUITY_SCORE: 49
ADLS_ACUITY_SCORE: 47
ADLS_ACUITY_SCORE: 49
ADLS_ACUITY_SCORE: 45

## 2023-11-02 NOTE — IR NOTE
Patient Name: Alfredo Burnham  Medical Record Number: [id number]  Today's Date: 11/2/2023    Procedure: Tunneled CVC removal  Proceduralist: Angus Valladares PA-C  Pathology present: NA    Procedure Start: 0902  Procedure end: 0909  Sedation medications administered: NA     Report given to: Asia JESSICA 6B  : GISEL    Other Notes: Pt arrived to IR room 7 from 6B.  Pt denies any questions or concerns regarding procedure. Pt positioned supine and monitored per protocol. Pt tolerated procedure without any noted complications. Pt transferred back to 6B.

## 2023-11-02 NOTE — PROCEDURES
[facility]    Procedure: Tunneled line removal    Date/Time: 11/2/2023 9:12 AM    Performed by: Ryan Valladares PA-C  Authorized by: Ryan Valladares PA-C      UNIVERSAL PROTOCOL   Site Marked: No  Prior Images Obtained and Reviewed:  Yes  Required items: Required blood products, implants, devices and special equipment available    Patient identity confirmed:  Verbally with patient, provided demographic data, hospital-assigned identification number and arm band  NA - No sedation, light sedation, or local anesthesia  Confirmation Checklist:  Patient's identity using two indicators, relevant allergies, procedure was appropriate and matched the consent or emergent situation and correct equipment/implants were available  Time out: Immediately prior to the procedure a time out was called    Universal Protocol: the Joint Commission Universal Protocol was followed    Preparation: Patient was prepped and draped in usual sterile fashion      SEDATION    Patient Sedated: No    See dictated procedure note for full details.  Findings: Tolerated well    Specimens: none    Complications: None    Condition: Stable    Plan: Bedrest for 2 hours with head elevated. No PT for 2 hours.      PROCEDURE  Describe Procedure: Right IJ tunneled line removed intact and without difficulty.  Patient Tolerance:  Patient tolerated the procedure well with no immediate complications  Length of time physician/provider present for 1:1 monitoring during sedation: 0

## 2023-11-02 NOTE — CONSULTS
"    Interventional Radiology  [facility] Consult Service Note    Alfredo Burnham  [id number]    11/02/23   8:10 AM    Consult Requested:  Consult Reason? Non-urgent, removal of tunneled line placed 10/18, pt recovering renal fx.    Patients clinical information/history? AAA with emboli to kidneys, Cr improving with increasing UOP.    Interventional Radiology Adult/Peds IP Consult: Which location will this be scheduled at? [place]; When do you want the patient seen? Routine within 24 hours; Consult Reason? Non-urgent, removal of tunneled line placed 10/18, pt recovering chano... [[id number]]    Electronically signed by: Bebeto Sesay APRN CNS on 11/02/23 8646  Call Back # [phone] Bebeto w/Neph    Plan:    Patient is on add-on IR schedule TODAY THU 11/2 for a Tunneled line removal; RIGHT    *Please note the date of procedure is tentative and that the Timing of Procedure is TBD Based on Staffing/Schedule and Triage.*    No NPO required.    Medications to be held include: None    Consent will be done prior to procedure.     Please contact the IR charge RN at [phone] for estimated time of procedure.     ===  History:  Placed 10/18 by IR    ===  Vitals:   BP (!) 155/100 (BP Location: Right arm)   Pulse 103   Temp 98.4  F (36.9  C) (Oral)   Resp 19   Ht 1.727 m (5' 8\")   Wt 71.8 kg (158 lb 4.6 oz)   SpO2 99%   BMI 24.07 kg/m      Pertinent Labs:   Lab Results   Component Value Date    WBC 8.2 11/02/2023    WBC 6.8 11/01/2023    WBC 7.9 10/31/2023    WBC 7.0 02/18/2009     Lab Results   Component Value Date    HGB 8.2 11/02/2023    HGB 7.6 11/01/2023    HGB 7.7 10/31/2023    HGB 15.1 02/18/2009     Lab Results   Component Value Date     11/02/2023     11/01/2023     10/31/2023     02/18/2009     Lab Results   Component Value Date    INR 1.24 (H) 10/25/2023    INR 0.9 10/15/2009    PTT 33 10/25/2023    PTT 30 02/18/2009     Lab Results   Component Value " Date    POTASSIUM 4.2 11/02/2023    POTASSIUM 3.7 10/13/2023    POTASSIUM 2.5 (LL) 09/25/2023    POTASSIUM 3.9 02/08/2011        COVID-19 Antibody Results, Testing for Immunity           No data to display              COVID-19 PCR Results           No data to display                  Nikhil Heard PA-C  Interventional Radiology  Pager: [phone]

## 2023-11-02 NOTE — PROGRESS NOTES
"Speech-Language Pathology: Video Swallow Study     11/02/23 1300   Appointment Info   Signing Clinician's Name / Credentials (SLP) Ana Jara, MS, CCC-SLP   General Information   Onset of Illness/Injury or Date of Surgery 09/24/23   Referring Physician Miryam Carrasco APRN CNP   Patient/Family Therapy Goal Statement (SLP) \"Can I have some water now?\"   Pertinent History of Current Problem Per provider notes: \"68 year old male with PMH of HTN and previous TIA who presented with R sided abdominal pain found to have contained ruptured AAA, now s/p open AAA repair 9/24 into 9/25am with 30 min supraceliac clamp time, prolonged inter-renal clamp time, temporary abdominal closure... On 9/29 he was started on CRRT given ongoing low UOP and rising Cr and failure of lasix challenge. Now on iHD. Hemodynamically continues to do well off pressors. S/p closure of abdominal fascia and subcutaneous tissue left open with wound VAC applied 10/2/23. With ischemic LLE, AKA deferred until 10/17/2023 due to leukocytosis, hypoxia requiring reintubation (10/13/23) and critical illness. ADDIS on 10/16/23 without evidence of embolic source. 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 S/p 10/20/23 for abdominal incision closure.\" Extubated again 10/20/23. VFSS completed this AM per provider orders.   General Observations Pt alert and cooperative. Sitting upright and agreeable to evaluation.   Type of Evaluation   Type of Evaluation Swallow Evaluation   General Swallowing Observations   Past History of Dysphagia None prior to this hospitalization. Speech therapy 10/11-10/12 recommending NPO, then intubated again for AHRF. Extubated 10/20 with SLP bedside re-evaluation on 10/21. Continues to be NPO with swallowing exercises with dysphagia treatment. Previous VFSS 10/23/2023 with silent aspiration of thins and suspected delayed aspiration from pourover of residuals for thickened liquids. Rec NPO.   Respiratory Support room air   Current Diet/Method of " Nutritional Intake (General Swallowing Observations, NIS) NPO;nasogastric tube (NG)   Swallowing Evaluation Videofluoroscopic swallow study (VFSS)   VFSS Evaluation   Radiologist Radiology Resident   Views Taken left lateral   Physical Location of Procedure [facility] Radiology Rm #5   VFSS Textures Trialed thin liquids;mildly thick liquids;moderately thick liquids/liquidized;pureed;solid foods   VFSS Eval: Thin Liquid Texture Trial   Mode of Presentation, Thin Liquid spoon;fed by clinician   Order of Presentation 13, 14   Preparatory Phase WFL   Oral Phase, Thin Liquid premature pharyngeal entry   Bolus Location When Swallow Triggered posterior laryngeal surface of epiglottis   Pharyngeal Phase, Thin Liquid impaired hyolaryngeal excursion;impaired epiglottic movement  (minimal reduction)   Rosenbek's Penetration Aspiration Scale: Thin Liquid Trial Results 6 - contrast passes glottis, no subglottic residue remains (aspiration)  (bowing under vocal folds)   Response to Aspiration absent response   Diagnostic Statement Aspiration of bowing under vocal folds with silent aspiration, but appeared to clear with subsequent swallow.   VFSS Eval: Mildly Thick Liquids   Mode of Presentation spoon;cup;straw;self-fed;fed by clinician   Order of Presentation 1, 2, 3, 4, 12   Preparatory Phase WFL   Oral Phase WFL   Bolus Location When Swallow Triggered valleculae;posterior laryngeal surface of epiglottis   Pharyngeal Phase impaired epiglottic movement;residue in vallecula   Rosenbek's Penetration Aspiration Scale 5 - contrast contacts vocal cords, visible residue remains (penetration)  (x1 with CT; otherwise 1)   Strategies and Compensations chin tuck  (resulted in contact with cords - worsened swallow)   Diagnostic Statement No penetration or aspiration with undercoating of epiglottis, which may result in suspected delayed aspiration. Cued cough clears residuals. Mild vallecular stasis.   VFSS Eval: Moderately Thick Liquids    Mode of Presentation cup;self-fed   Order of Presentation 5, 6, 11   Preparatory Phase WFL   Oral Phase WFL   Bolus Location When Swallow Triggered valleculae   Pharyngeal Phase impaired epiglottic movement   Rosenbek's Penetration Aspiration Scale 1 - no aspiration, contrast does not enter airway   Diagnostic Statement No penetration or aspiration. Mild residuals in vallecula and undercoating of epiglottis for suspected delayed aspiration.   VFSS Evaluation: Puree Solid Texture Trial   Mode of Presentation, Puree spoon;self-fed   Order of Presentation 7, 8   Preparatory Phase WFL   Oral Phase, Puree WFL   Bolus Location When Swallow Triggered posterior angle of ramus   Pharyngeal Phase, Puree impaired epiglottic movement   Rosenbek's Penetration Aspiration Scale: Puree Food Trial Results 1 - no aspiration, contrast does not enter airway   Strategies and Compensations liquid wash;double swallow   Diagnostic Statement No penetration or aspiration. Mild-moderate vallecular residue and minimal stasis around NG tube along posterior pharyngeal wall, which mostly reduced with sponanteous sequential swallow.   VFSS Evaluation: Solid Food Texture Trial   Mode of Presentation, Solid fed by clinician   Order of Presentation 9, 10   Preparatory Phase WFL   Oral Phase, Solid WFL   Bolus Location When Swallow Triggered posterior angle of ramus   Pharyngeal Phase, Solid impaired epiglottic movement   Rosenbek's Penetration Aspiration Scale: Solid Food Trial Results 1 - no aspiration, contrast does not enter airway   Strategies and Compensations liquid wash;double swallow   Diagnostic Statement No penetration or aspiration. Mild-moderate vallecular residue and minimal stasis around NG tube along posterior pharyngeal wall, which mostly reduced with sponanteous sequential swallow.   Esophageal Phase of Swallow   Patient reports or presents with symptoms of esophageal dysphagia No   Swallowing Recommendations   Diet Consistency  Recommendations mildly thick liquids (level 2);minced & moist (level 5)   Supervision Level for Intake 1:1 supervision needed   Mode of Delivery Recommendations bolus size, small;no straws;slow rate of intake   Swallowing Maneuver Recommendations supraglottic swallow;throat clear-swallow   Monitoring/Assistance Required (Eating/Swallowing) stop eating activities when fatigue is present;monitor for cough or change in vocal quality with intake   Recommended Feeding/Eating Techniques (Swallow Eval) maintain upright sitting position for eating;minimize distractions during oral intake;provide assist with feeding;set-up and prepare tray   Medication Administration Recommendations, Swallowing (SLP) nonorally or crushed in puree   Instrumental Assessment Recommendations VFSS (videofluoroscopic swallowing study)  (repeat in 1-2 weeks; prior to liquid advancement)   General Therapy Interventions   Planned Therapy Interventions Dysphagia Treatment   Dysphagia treatment Oropharyngeal exercise training;Instruction of safe swallow strategies;Modified diet education;Compensatory strategies for swallowing   Clinical Impression   Criteria for Skilled Therapeutic Interventions Met (SLP Eval) Yes, treatment indicated   SLP Diagnosis Mild-moderate oropharyngeal dysphagia   Problem List (SLP) Below baseline swallowing and confusion   Risks & Benefits of therapy have been explained evaluation/treatment results reviewed;care plan/treatment goals reviewed;risks/benefits reviewed;current/potential barriers reviewed;participants voiced agreement with care plan;participants included;patient   Clinical Impression Comments Videofluoroscopic Swallow Study completed. Patient with vocal fold injection 11/1/2023. Patient with rough vocal quality, but able to achieve voicing with cues. Patient had silent aspiration with thin liquids.  Delayed residuals of undercoating of the epiglottis with mildly and moderately thickened liquids, with suspected  delayed aspiration. Oral phase is WFL. Tongue base retraction was WFL. Swallow response was relatively timely with initiation at the vallecular for liquids. Epiglottic inversion was incomplete with posterior tilt with liquids and posterior-inferior tilt for solids.  Mild-moderate vallecular stasis occurred with all textures trialed. Hyolaryngeal elevation and hyolaryngeal excursion was minimally reduced. Mastication safe and timely. Cricopharyngeus allowed the bolus to easily pass. Recommend diet of minced and moist solids and mildly thickened liquids when sitting fully upright, alert, small bolus size, NO STRAWS, and occasional cough during intake. Recommend repeat VFSS in 1-2 weeks. SLP will continue to follow for dysphagia management and tolerance.   SLP Total Evaluation Time   Evaluation, videofluoroscopic eval of swallow function Minutes (58309) 15   SLP Discharge Planning   SLP Plan diet tolerance (IDDSI 5/2) with swallow strategies; trial advanced solids; monitor respiratory status closely   SLP Discharge Recommendation Transitional Care Facility   SLP Rationale for DC Rec Mild-moderate oropharyngeal dysphagia with modified diet   SLP Brief overview of current status  Recommending minced and moist diet and mildly thickened liquids with swallow strategies: sitting fully upright, alert, small bolus size, NO STRAWS, and occasional cough during intake.. Good oral cares. Monitor respiratory status & repeat VFSS around 11/9. SLP will follow   Total Session Time   Total Session Time (sum of timed and untimed services) 15

## 2023-11-02 NOTE — PLAN OF CARE
Neuro: A&Ox4.Whispers/hoarse voice. Anxious/restless, better after PO intake. Tired/sleeping between cares this AM.  Cardiac: ST. VSS. SBP 120s-150s; DBP 70s-100, MAPs 80s-110s. -110s.            Respiratory: Sating >93% on RA.  GI/: Adequate urine output. No BM this shift -LBM 11/1.  Diet/appetite: Moist/minced diet with mild thickened liquids (lvl 2) and TF.  Activity:  Lift to chair/wheelchair. Up in chair about 1-2 hours.  Pain: At acceptable level on current regimen. Pain primarily in L residual limb  Skin: Small LUE skin tear - cleaned and primapore applied. No other new deficits noted.  LDA's: TF running through NJ at 40mL/hr with q4 30mL FWF. Wound vac ulta to L residual limb. Abd incision C/D/I with new dressing. Heparin gttp running at 1450 units/hr. HD line removed this AM.     Plan: Continue with POC. Notify primary team with changes.       Problem: Fatigue  Goal: Improved Activity Tolerance  Outcome: Not Progressing  Intervention: Promote Improved Energy  Recent Flowsheet Documentation  Taken 11/2/2023 1538 by Asia Chung, RN  Activity Management:   activity adjusted per tolerance   activity encouraged  Taken 11/2/2023 1520 by Asia Chung, RN  Activity Management:   activity adjusted per tolerance   activity encouraged   back to bed  Taken 11/2/2023 1245 by Asia Chung, RN  Activity Management:   activity adjusted per tolerance   activity encouraged  Taken 11/2/2023 1103 by Asia Chung, RN  Activity Management:   activity adjusted per tolerance   activity encouraged  Taken 11/2/2023 0800 by Asia Chung, RN  Activity Management:   activity adjusted per tolerance   activity encouraged  Taken 11/2/2023 0727 by Asia Chung, RN  Activity Management:   activity adjusted per tolerance   activity encouraged

## 2023-11-02 NOTE — PROGRESS NOTES
Nephrology Progress Note  11/02/2023       Alfredo Burnham is a 68 yom with complex hx of TIA, HTN, blindness who presented to [facility] 9/24 with severe abdominal pain radiating to flank and back, CT revealed AAA with a contained rupture.  Taken to OR for aortobiiliac bypass and resection of ruptured pararenal aneurysm.   Post op course complicated by hypotension requiring pressors and metabolic acidosis.  Baseline Cr 1.0 on presentation but on the rise to >5 at time of renal consult for MAYA management and possible RRT.       Interval History :   Mr Burnham's Cr is down significantly the past 3 days, UOP robust.  Will need follow-up in CKD clinic but is recovering enough to be trending away from needing HD, will request non-urgent removal of tunneled line.  Will sign off from daily visits but I will follow labs peripherally and arrange follow-up in CKD/post MAYA clinic.      Assessment & Recommendations:   MAYA-Baseline Cr 1.0, ordered UA.  CT initially showed benign cyst but otherwise normal kidneys but subsequent imaging showed scattered areas of infarct related to emboli with AAA.  Cr on the rise since surgery, did receive contrast for CTA.  Urine microscopy showed granular casts suggesting ATN which will recover with time and stabilizing hemodynamics.  Started on CRRT 9/30 for volume and clearance with minimal UOP and rising Cr.  Transitioned to iHD on 10/5 and watching for recovery, UOP increasing and Cr starting to downtrend.      -Holding on run today, Cr on downtrend and UOP increasing.       -Appreciate IR placing tunneled line RIJ 10/18.  Will request removal non-urgently.                    -Dialysis consent signed and scanned into media tab.      -Infarcts seen on CT 10/13 lowers chances of recovery.          Volume-UOP robust without diuretics, able to match intake and on exam has minimal edema on exam.      Electrolytes-K 4.2, bicarb 23, Na 142, can be on normal K diet with improving renal fx.       "  BMD-Ca 9.4, Mg 2.4.  Phos 4.7, no issues.        Anemia-Hgb 8.2 and stable after bleeding early last week, likely iron replete after multiple PRBC's but will check iron stores.   ~14 on presentation, acute management per team.       Nutrition-On Renal TF, can liberalize to non-renal formula if it would be of benefit.       Time spent: 40 minutes on this date of encounter for chart review, physical exam, medical decision making and co-ordination of care.       Discussed with Dr Foley    Recommendations were communicated to primary team via verbal communication.        ALTAGRACIA Jiménez CNS  Clinical Nurse Specialist  [phone]    Review of Systems:   I reviewed the following systems:  ROS not done due to lethargy    Physical Exam:   I/O last 3 completed shifts:  In: 1611 [I.V.:261; NG/GT:390]  Out: 1375 [Urine:1375]   BP (!) 155/100 (BP Location: Right arm)   Pulse 103   Temp 98.4  F (36.9  C) (Oral)   Resp 19   Ht 1.727 m (5' 8\")   Wt 71.8 kg (158 lb 4.6 oz)   SpO2 99%   BMI 24.07 kg/m       GENERAL APPEARANCE: Sitting in chair, feeling well, in no distress.    EYES: No scleral icterus  Pulmonary: On RA  CV: Regular rhythm, normal rate   - Edema +1 generalized  GI: distended, nontender  MS: no evidence of inflammation in joints, no muscle tenderness  : No Lu  SKIN: no rash, warm, dry  NEURO: No focal deficits.         Labs:   All labs reviewed by me  Electrolytes/Renal -   Recent Labs   Lab Test 11/02/23  0547 11/01/23  0500 10/31/23  0604    142 138   POTASSIUM 4.2 3.0* 3.1*   CHLORIDE 105 102 100   CO2 23 25 26   .0* 108.0* 102.0*   CR 2.84* 3.17* 3.38*   * 118* 118*   OMAR 9.4 9.1 8.9   MAG 2.4* 2.5* 2.4*   PHOS 4.7* 4.8* 4.2       CBC -   Recent Labs   Lab Test 11/02/23 0547 11/01/23  0500 10/31/23  0604   WBC 8.2 6.8 7.9   HGB 8.2* 7.6* 7.7*    162 158       LFTs -   Recent Labs   Lab Test 10/30/23  0651 10/29/23  0401 10/28/23  0525   ALKPHOS 132* 128 133* "   BILITOTAL 0.5 0.4 0.5   ALT 15 19 25   AST 24 25 34   PROTTOTAL 6.1* 5.8* 6.1*   ALBUMIN 3.1* 3.0* 3.1*       Iron Panel - No lab results found.        Current Medications:   acetaminophen  975 mg Oral or Feeding Tube Q6H    atorvastatin  10 mg Oral or Feeding Tube QPM    banatrol plus  1 packet Per Feeding Tube Daily at 10 am    hydrocortisone   Topical BID    lidocaine 1% with EPINEPHrine 1:100,000  5 mL Intradermal Once    melatonin  1 mg Oral or Feeding Tube QPM    pantoprazole  40 mg Oral or Feeding Tube QAM    QUEtiapine  100 mg Oral or Feeding Tube At Bedtime    QUEtiapine  50 mg Oral BID    triamcinolone   Topical BID    wound support modular  60 mL Per Feeding Tube Daily      dextrose      dextrose Stopped (10/22/23 2106)    heparin 1,450 Units/hr (11/02/23 0000)

## 2023-11-02 NOTE — PROGRESS NOTES
Vascular Surgery Progress Note  11/02/2023       Subjective:  Underwent vocal fold injection with otolaryngology yesterday evening, per renal -tunneled line removed by IR this morning.  With ongoing renal recovery, creatinine down to 2.84, BUN down to 101.  Made 1.3L of urine output overnight.    Objective:  Temp:  [98.2  F (36.8  C)-98.5  F (36.9  C)] 98.4  F (36.9  C)  Pulse:  [] 103  Resp:  [12-22] 20  BP: (138-174)/() 155/100  Cuff Mean (mmHg):  [113] 113  SpO2:  [96 %-100 %] 100 %    I/O last 3 completed shifts:  In: 1611 [I.V.:261; NG/GT:390]  Out: 1375 [Urine:1375]      Gen: resting comfortably in bed, answering questions appropriately, no delirium/confusion, whispering words, not in acute distress  CV: tachycardic per tele with PVCs  Resp: non-labored, on room air   Abd: Abdominal incision c/d/I. No active bleeding, abdomen soft, non-tender  Ext: RLE wwp, palpable DP pulse, LLE stump incision with continued improved surrounding erythema similar to erythema of entire body, no obvious bleeding, soft, incisional prevena holding seal.    Prevena LLE placed on 10/30/23 - to be replaced in 5-7 days  Skin: Entire body with blanching erythema covering trunk arms, and legs.     Labs:  Recent Labs   Lab 11/02/23  0547 11/01/23  0500 10/31/23  0604   WBC 8.2 6.8 7.9   HGB 8.2* 7.6* 7.7*    162 158       Recent Labs   Lab 11/02/23  0547 11/01/23  0500 10/31/23  0604    142 138   POTASSIUM 4.2 3.0* 3.1*   CHLORIDE 105 102 100   CO2 23 25 26   .0* 108.0* 102.0*   CR 2.84* 3.17* 3.38*   * 118* 118*   OMAR 9.4 9.1 8.9   MAG 2.4* 2.5* 2.4*   PHOS 4.7* 4.8* 4.2      Assessment/Plan:   Alfredo Burnham is a 68 year old male with PMH of HTN and previous TIA who presented with R sided abdominal pain found to have contained ruptured AAA, now s/p open AAA repair 9/24 into 9/25am with 30 min supraceliac clamp time, prolonged inter-renal clamp time, temporary abdominal closure. He did have  bloody stool postop with flex sig 9/25 demonstrating ischemic mucosal changes of the rectum, repeated abdominal without evidence of transmural necrosis of the small or large intestine, or the rectum. On 9/29 he was started on CRRT, now on iHD. S/p closure of abdominal fascia and subcutaneous tissue wound VAC applied 10/2/23. 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 On 10/20/23 underwent abdominal incision closure.     10/24/23 found to have hgb 5.8, CTA demonstrated left psoas muscle hematoma now size 6 cm x 3 cm x 3 cm, barely seen on CTA from 10/13. Lesser sac of the peritoneal cavity collection stable, hematoma in left iliac stable, no active extravasation. Hep gtt held.     Overnight 10/24-10/25 patient more lethargic with fevers at 102.1 thought to be strokelike symptoms, stroke code called, workup negative. Hemoglobin dropped again to 5.8, despite holding heparin for 24 hours. Repeat CTA: demonstrated hematoma in LLE stump, with otherwise stable left iliac hematoma and stable left psoas muscle hematoma. Patient's hgb stable, he is progressing well with continued improvements in mental status, this morning patient is alert and oriented x3, without confusion or delirium.    Neuro:   - Alert, oriented now, improving daily.  - Seroquel at bedtime and as needed for anxiety/delirium episodes  - Previously had stroke code called for unequal pupils, c/f facial droop 10/6: workup revealed: Numerous, punctate late hyperacute to acute infarctions thought to be embolic in nature. No embolic source identified in workup.  - Follow-up with neurology 4-6 weeks after discharge, ok with a/c.   HEENT:   - ENT consult for ongoing dysphonia/dysphagia   - Endoscopy with L vocal cord paralysis   - Bedside vocal fold injection 11/1/23 with otolaryngology.  CV:   - Midodrine 20mg  prn with HD runs   - Started amlodipine 5mg daily 11/2/23 (at home on amlodipine 5mg and benazepril 20mg) due to SBP>160 requiring Labetalol IV. Will consider  adding the benzepril if needed once we have indication that he tolerates amlodipine well.   - Goal SBP <160, MAP >65  - Labetalol prn for SBP >160  - Continue statin  - PE+, venous duplex with nonocclusive to occlusive thrombus of the left GSV from the level of the knee to the groin and nonocclusive thrombus of the left distal femoral vein and popliteal veins, increased in extent compared to 9/30/2023.   - Heparin drip low intensity, with therapeutic anti-Xa, no plan to change to high intensity, appropriately anticoagulated.  Resp:   - On room air  GI:   - Concern for Pancreatitis with peripancreatic fluid collection on CT 10/13 with question of bleeding into the collection. GI signed off as collection not drainable. - Improved appearance on repeat CT 10/25. Monitoring.    - TF tolerating via NJT, formula changed 10/31/23 due to multiple BMs, added fiber to formula.  - Nutrition following, appreciate recommendations.  - Change abd dressing daily  - Failed SLP, failed video swallow eval, continue NPO with swallow exercises. Pending new swallow eval today 11/2/23  FEN:   - Electrolyte replacement prn   Renal:   - Appreciate nephrology recommendations  - Last iHD 10/27. Tolerated Lasix challenge 10/28 with 700cc UOP + 1x unmeasured.  Continues to make good amount of urine, over 1.3L in the last 24 hours.   - Tunneled line removed with IR 11/2/23 per nephrology  Heme:   - Hemoglobin stable    - DVT as above, PE   - Heparin drip at low intensity, with therapeutic anti-Xa 0.4. No plan to transition to high intensity, appropriately anticoagulated.  ID:   -No further fevers, no leukocytosis. Patient has allergy to cefepime (hives). Continue to monitor for fevers, leukocytosis.  - monitor signs of pneumonia  - nystatin swish for oral candida  MSK:   - L AKA 10/17/23: prevena applied 10/30/23, to stay on for 5 to 7 days  - Orthotics provided stump protector and  11/1/23  Derm:   - Rash significantly improved, not  cellulitis  - Pharm reviewed meds, all low risk, derm consulted, ID has seen as well, all felt were likely drug induced presumably from cefepime  - Did receive 1 dose cefepime 10/25 - this may be cause of worsening. May take 1-2 weeks to clear up.  - Appreciate dermatology recs, likely morbilliform eruption vs DRESS (low risk)    - CBC with diff, will repeat 10/30   - LFTs, will repeat 10/30   - triamcinolone 0.1% changed back to BID scheduled 10/29 given worsening   Misc:   - Appreciate aggressive PT/OT ROM, patient severely deconditioned and requires extensive physical therapy.  - Abd binder on at all times.  - please keep LLE AKA wrapped with ACE  - IMC status  - Anticipate will require ARU at discharge    Discussed patient with vascular surgery staff who Is in agreement with the above.      Miryam Carrasco, SACHIN  Vascular Surgery  Pager: [phone]  [email or web address]  Send message or 10 digit call back number Securely via [employer] with the [employer] Web Console (learn more here)

## 2023-11-02 NOTE — PLAN OF CARE
[id number]     Neuro: A&Ox3-4. Intermittently disoriented to time, knows month but not year. Can be forgetful but behavior overall appropriate throughout shift. Some anxiety/restlessness. Given PRN atarax with some effectiveness.Able to make needs known.   Cardiac:  Sinus/Sinus tach. Hypertensive w sys 170s, given 1 dose of 20 mg IV labetalol, which was effective. S1, S2. Denies CP/SOB.   Respiratory: RR even and nonlabored w sats 90%% on RA. Denies CP/SOB.   GI/: LBM 11/1. Adequate UOP.  Diet/appetite: NPO- TF. Very frustrated this shift d/t NPO status, and would like to talk to team today regarding NPO orders.  Activity: NOOB this shift. Moves self in bed.    Pain: Moderate-severe pain with activity/turning. Pain well controlled w 1 PRN dose of oxy 5 mg and scheduled tylenol.    Skin:  No new skin integrity issues or concerns noted this shift.   LDA's: R HD CVC. L PIV w hep infusing at 1450 unit(s)/hr. Wound vac to LLE stump at 125 suction, no output. ND w TF at 40 mL/hr w 30 mL FWF Q4.   Plan: No changes to current plan of care. Contact primary care team (VASCULAR SURGERY) w changes or concerns.     Temp: 98.5  F (36.9  C) Temp src: Oral BP: (!) 159/84 Pulse: 95   Resp: 17 SpO2: 96 % O2 Device: None (Room air)       Intake/Output Summary (Last 24 hours) at 11/2/2023 0641  Last data filed at 11/2/2023 0600  Gross per 24 hour   Intake 1611 ml   Output 1525 ml   Net 86 ml      CBC RESULTS:   Recent Labs   Lab Test 11/02/23  0547   WBC 8.2   RBC 2.77*   HGB 8.2*   HCT 26.4*   MCV 95   MCH 29.6   MCHC 31.1*   RDW 16.2*

## 2023-11-03 ENCOUNTER — APPOINTMENT (OUTPATIENT)
Dept: GENERAL RADIOLOGY | Facility: CLINIC | Age: 68
DRG: 268 | End: 2023-11-03
Attending: NURSE PRACTITIONER
Payer: COMMERCIAL

## 2023-11-03 ENCOUNTER — APPOINTMENT (OUTPATIENT)
Dept: PHYSICAL THERAPY | Facility: CLINIC | Age: 68
DRG: 268 | End: 2023-11-03
Payer: COMMERCIAL

## 2023-11-03 ENCOUNTER — APPOINTMENT (OUTPATIENT)
Dept: SPEECH THERAPY | Facility: CLINIC | Age: 68
DRG: 268 | End: 2023-11-03
Payer: COMMERCIAL

## 2023-11-03 LAB
ABO/RH(D): NORMAL
ANION GAP SERPL CALCULATED.3IONS-SCNC: 13 MMOL/L (ref 7–15)
ANTIBODY SCREEN: NEGATIVE
BLD PROD TYP BPU: NORMAL
BLD PROD TYP BPU: NORMAL
BLOOD COMPONENT TYPE: NORMAL
BLOOD COMPONENT TYPE: NORMAL
BUN SERPL-MCNC: 90.2 MG/DL (ref 8–23)
CALCIUM SERPL-MCNC: 9.6 MG/DL (ref 8.8–10.2)
CHLORIDE SERPL-SCNC: 105 MMOL/L (ref 98–107)
CODING SYSTEM: NORMAL
CODING SYSTEM: NORMAL
CREAT SERPL-MCNC: 2.5 MG/DL (ref 0.67–1.17)
CROSSMATCH: NORMAL
CROSSMATCH: NORMAL
DEPRECATED HCO3 PLAS-SCNC: 24 MMOL/L (ref 22–29)
EGFRCR SERPLBLD CKD-EPI 2021: 27 ML/MIN/1.73M2
ERYTHROCYTE [DISTWIDTH] IN BLOOD BY AUTOMATED COUNT: 16.5 % (ref 10–15)
GLUCOSE BLDC GLUCOMTR-MCNC: 140 MG/DL (ref 70–99)
GLUCOSE SERPL-MCNC: 141 MG/DL (ref 70–99)
HCT VFR BLD AUTO: 23.3 % (ref 40–53)
HGB BLD-MCNC: 6.7 G/DL (ref 13.3–17.7)
HGB BLD-MCNC: 7.1 G/DL (ref 13.3–17.7)
HGB BLD-MCNC: 8.1 G/DL (ref 13.3–17.7)
ISSUE DATE AND TIME: NORMAL
MAGNESIUM SERPL-MCNC: 2.2 MG/DL (ref 1.7–2.3)
MCH RBC QN AUTO: 29.8 PG (ref 26.5–33)
MCHC RBC AUTO-ENTMCNC: 30.5 G/DL (ref 31.5–36.5)
MCV RBC AUTO: 98 FL (ref 78–100)
PHOSPHATE SERPL-MCNC: 3.8 MG/DL (ref 2.5–4.5)
PLATELET # BLD AUTO: 179 10E3/UL (ref 150–450)
POTASSIUM SERPL-SCNC: 4.5 MMOL/L (ref 3.4–5.3)
RBC # BLD AUTO: 2.38 10E6/UL (ref 4.4–5.9)
SODIUM SERPL-SCNC: 142 MMOL/L (ref 135–145)
SPECIMEN EXPIRATION DATE: NORMAL
UFH PPP CHRO-ACNC: 0.13 IU/ML
UFH PPP CHRO-ACNC: 0.22 IU/ML
UFH PPP CHRO-ACNC: 0.53 IU/ML
UNIT ABO/RH: NORMAL
UNIT ABO/RH: NORMAL
UNIT NUMBER: NORMAL
UNIT NUMBER: NORMAL
UNIT STATUS: NORMAL
UNIT STATUS: NORMAL
UNIT TYPE ISBT: 5100
UNIT TYPE ISBT: 5100
WBC # BLD AUTO: 7 10E3/UL (ref 4–11)

## 2023-11-03 PROCEDURE — 90662 IIV NO PRSV INCREASED AG IM: CPT | Performed by: NURSE PRACTITIONER

## 2023-11-03 PROCEDURE — 250N000011 HC RX IP 250 OP 636: Mod: JZ | Performed by: NURSE PRACTITIONER

## 2023-11-03 PROCEDURE — 250N000013 HC RX MED GY IP 250 OP 250 PS 637

## 2023-11-03 PROCEDURE — 71045 X-RAY EXAM CHEST 1 VIEW: CPT | Mod: 26 | Performed by: RADIOLOGY

## 2023-11-03 PROCEDURE — 85018 HEMOGLOBIN: CPT | Performed by: NURSE PRACTITIONER

## 2023-11-03 PROCEDURE — 80048 BASIC METABOLIC PNL TOTAL CA: CPT

## 2023-11-03 PROCEDURE — 86900 BLOOD TYPING SEROLOGIC ABO: CPT

## 2023-11-03 PROCEDURE — 85520 HEPARIN ASSAY: CPT | Performed by: SURGERY

## 2023-11-03 PROCEDURE — 71045 X-RAY EXAM CHEST 1 VIEW: CPT

## 2023-11-03 PROCEDURE — 250N000011 HC RX IP 250 OP 636: Mod: JZ

## 2023-11-03 PROCEDURE — 85027 COMPLETE CBC AUTOMATED: CPT | Performed by: NURSE PRACTITIONER

## 2023-11-03 PROCEDURE — 86923 COMPATIBILITY TEST ELECTRIC: CPT

## 2023-11-03 PROCEDURE — 250N000011 HC RX IP 250 OP 636: Performed by: NURSE PRACTITIONER

## 2023-11-03 PROCEDURE — 999N000248 HC STATISTIC IV INSERT WITH US BY RN

## 2023-11-03 PROCEDURE — 99024 POSTOP FOLLOW-UP VISIT: CPT | Performed by: NURSE PRACTITIONER

## 2023-11-03 PROCEDURE — 36415 COLL VENOUS BLD VENIPUNCTURE: CPT | Performed by: NURSE PRACTITIONER

## 2023-11-03 PROCEDURE — 84100 ASSAY OF PHOSPHORUS: CPT

## 2023-11-03 PROCEDURE — 85018 HEMOGLOBIN: CPT

## 2023-11-03 PROCEDURE — P9016 RBC LEUKOCYTES REDUCED: HCPCS

## 2023-11-03 PROCEDURE — 86923 COMPATIBILITY TEST ELECTRIC: CPT | Performed by: NURSE PRACTITIONER

## 2023-11-03 PROCEDURE — 120N000003 HC R&B IMCU UMMC

## 2023-11-03 PROCEDURE — 97530 THERAPEUTIC ACTIVITIES: CPT | Mod: GP

## 2023-11-03 PROCEDURE — 92526 ORAL FUNCTION THERAPY: CPT | Mod: GN

## 2023-11-03 PROCEDURE — 250N000013 HC RX MED GY IP 250 OP 250 PS 637: Performed by: NURSE PRACTITIONER

## 2023-11-03 PROCEDURE — 250N000013 HC RX MED GY IP 250 OP 250 PS 637: Performed by: SURGERY

## 2023-11-03 PROCEDURE — 85520 HEPARIN ASSAY: CPT | Performed by: NURSE PRACTITIONER

## 2023-11-03 PROCEDURE — G0008 ADMIN INFLUENZA VIRUS VAC: HCPCS | Performed by: NURSE PRACTITIONER

## 2023-11-03 PROCEDURE — 36415 COLL VENOUS BLD VENIPUNCTURE: CPT | Performed by: SURGERY

## 2023-11-03 PROCEDURE — 83735 ASSAY OF MAGNESIUM: CPT

## 2023-11-03 PROCEDURE — 999N000128 HC STATISTIC PERIPHERAL IV START W/O US GUIDANCE

## 2023-11-03 PROCEDURE — 36415 COLL VENOUS BLD VENIPUNCTURE: CPT

## 2023-11-03 RX ORDER — METOPROLOL TARTRATE 25 MG/1
25 TABLET, FILM COATED ORAL 2 TIMES DAILY
Status: DISCONTINUED | OUTPATIENT
Start: 2023-11-03 | End: 2023-11-06

## 2023-11-03 RX ADMIN — QUETIAPINE FUMARATE 100 MG: 100 TABLET ORAL at 21:04

## 2023-11-03 RX ADMIN — OXYCODONE HYDROCHLORIDE 10 MG: 5 TABLET ORAL at 20:41

## 2023-11-03 RX ADMIN — ATORVASTATIN CALCIUM 10 MG: 10 TABLET, FILM COATED ORAL at 20:41

## 2023-11-03 RX ADMIN — ACETAMINOPHEN 975 MG: 325 TABLET, FILM COATED ORAL at 12:13

## 2023-11-03 RX ADMIN — ACETAMINOPHEN 975 MG: 325 TABLET, FILM COATED ORAL at 06:24

## 2023-11-03 RX ADMIN — TRIAMCINOLONE ACETONIDE: 1 OINTMENT TOPICAL at 20:42

## 2023-11-03 RX ADMIN — AMLODIPINE BESYLATE 5 MG: 5 TABLET ORAL at 07:57

## 2023-11-03 RX ADMIN — QUETIAPINE FUMARATE 50 MG: 50 TABLET ORAL at 20:42

## 2023-11-03 RX ADMIN — Medication 1 PACKET: at 09:56

## 2023-11-03 RX ADMIN — Medication 60 ML: at 08:01

## 2023-11-03 RX ADMIN — ACETAMINOPHEN 975 MG: 325 TABLET, FILM COATED ORAL at 17:53

## 2023-11-03 RX ADMIN — TRIAMCINOLONE ACETONIDE: 1 OINTMENT TOPICAL at 07:58

## 2023-11-03 RX ADMIN — Medication: at 07:58

## 2023-11-03 RX ADMIN — LABETALOL HYDROCHLORIDE 20 MG: 5 INJECTION, SOLUTION INTRAVENOUS at 15:03

## 2023-11-03 RX ADMIN — HYDROCORTISONE: 25 OINTMENT TOPICAL at 20:42

## 2023-11-03 RX ADMIN — METOPROLOL TARTRATE 25 MG: 25 TABLET, FILM COATED ORAL at 20:41

## 2023-11-03 RX ADMIN — Medication 40 MG: at 07:58

## 2023-11-03 RX ADMIN — HEPARIN SODIUM 1450 UNITS/HR: 10000 INJECTION, SOLUTION INTRAVENOUS at 03:57

## 2023-11-03 RX ADMIN — Medication 1 MG: at 17:53

## 2023-11-03 RX ADMIN — METOPROLOL TARTRATE 25 MG: 25 TABLET, FILM COATED ORAL at 09:56

## 2023-11-03 RX ADMIN — HYDROXYZINE HYDROCHLORIDE 50 MG: 50 TABLET ORAL at 20:41

## 2023-11-03 RX ADMIN — INFLUENZA A VIRUS A/VICTORIA/4897/2022 IVR-238 (H1N1) ANTIGEN (FORMALDEHYDE INACTIVATED), INFLUENZA A VIRUS A/DARWIN/9/2021 SAN-010 (H3N2) ANTIGEN (FORMALDEHYDE INACTIVATED), INFLUENZA B VIRUS B/PHUKET/3073/2013 ANTIGEN (FORMALDEHYDE INACTIVATED), AND INFLUENZA B VIRUS B/MICHIGAN/01/2021 ANTIGEN (FORMALDEHYDE INACTIVATED) 0.7 ML: 60; 60; 60; 60 INJECTION, SUSPENSION INTRAMUSCULAR at 09:57

## 2023-11-03 RX ADMIN — HEPARIN SODIUM 1600 UNITS/HR: 10000 INJECTION, SOLUTION INTRAVENOUS at 22:45

## 2023-11-03 RX ADMIN — HYDROCORTISONE: 25 OINTMENT TOPICAL at 08:00

## 2023-11-03 RX ADMIN — LABETALOL HYDROCHLORIDE 10 MG: 5 INJECTION, SOLUTION INTRAVENOUS at 06:55

## 2023-11-03 RX ADMIN — OXYCODONE HYDROCHLORIDE 5 MG: 5 TABLET ORAL at 15:03

## 2023-11-03 RX ADMIN — HYDROXYZINE HYDROCHLORIDE 25 MG: 25 TABLET ORAL at 06:24

## 2023-11-03 RX ADMIN — QUETIAPINE FUMARATE 50 MG: 50 TABLET ORAL at 07:59

## 2023-11-03 ASSESSMENT — ACTIVITIES OF DAILY LIVING (ADL)
ADLS_ACUITY_SCORE: 47
ADLS_ACUITY_SCORE: 47
ADLS_ACUITY_SCORE: 51
ADLS_ACUITY_SCORE: 47
ADLS_ACUITY_SCORE: 51
ADLS_ACUITY_SCORE: 47
ADLS_ACUITY_SCORE: 47
ADLS_ACUITY_SCORE: 51

## 2023-11-03 NOTE — PROGRESS NOTES
CLINICAL NUTRITION SERVICES - BRIEF NOTE     Nutrition Prescription    RECOMMENDATIONS FOR MDs/PROVIDERS TO ORDER:  Monitor ability for pt to consume at least ~1250 Kcals and ~65 gm protein before considering removal of FT (65% est needs)     Recommendations already ordered by Registered Dietitian (RD):  Juvencio cts 11/4-11/6  Begin oral nutrition supplements   Breakfast - Vanilla Ensure Enlive, thickened per diet order  Lunch - Vanilla Magic Cup  Dinner - Vanilla Vital Cuisine   **Allow additional supplements by request PRN**   Modify patient to cyclic TFs as below:   Two Juvencio HN at rate of 40 mL/hr x 14 hours/evening (4574-6017) + 1 pkt Banatrol TF + Expedite WH supplement to provide: 560 mL formula, 1265 Kcals, 59 gm protein, 123 gm CHO, ~8 gm fiber, and 392 mL free water daily   --> Meets 70% low end Kcal needs, ~70% low end est protein needs      Future/Additional Recommendations:  Continue to monitor nutrition related findings per protocol    For last full RD assessment, see note dated 10/31/23    NEW FINDINGS   - Vocal cord injection completed 11/2  - VFSS completed 11/2; cleared by SLP For IDDSI level 5 minced/moist diet with level 2 mildly thickened liquids (NO straws, must be sitting upright, alert).   - Discussed pt with primary team (vascular surgery); plan to cycle TFs, begin juvencio cts and ONS while monitoring PO intake.   - Met with pt and and wife at bedside; discussed plan for cyclic TFs and oral nutrition supplement options. Additionally discussed plan for juvencio cts and made sure plan is in place for meal orders (wife plans to pre-schedule meals ahead of time). Pt continues w/ diarrhea; little change over past week. Low appetite today, dislikes some of the thickened liquids. Is open to trying a variety of vanilla supplements. Encourage PO intake, monitor trends.     INTERVENTIONS  Implementation  Collaboration with other providers - Primary team MD, bedside RN   Enteral Nutrition - cycle starting tonight    Medical food supplement therapy - Schedule TID + additional PRN if requested   Modify composition of meals/snacks - Wife to assist w/ meal orders; abdiel cts to begin    Monitoring/Evaluation  Will continue to monitor and evaluate per protocol.    Donavan Cobb RDN, LD, CNSC  [address]B work-room RD phone: [phone]   [address]B/Obs RD pager: [phone]    Weekend/Holiday RD pager [phone]

## 2023-11-03 NOTE — PLAN OF CARE
Care provided from [id number]    Neuro: A&Ox4. Intermittently anxious. Speech is soft but clear and logical. Pupils equal, round, and reactive to light. Pt denies headache.   Cardiac: Pt on tele and has been in sinus tachycardia. Vital signs stable. Afebrile. Pt denies chest pain or palpitations. S1 and S2 audible on auscultation. Blood pressure this PM was elevated. PRN labetalol x1 administered.  Respiratory: Sating >94% on room air. Lung sounds clear in upper and middle lobes but diminished in lower lobes on auscultation. Pt denies shortness of breath or difficulty breathing.   GI/: Pt denies difficulty voiding or having a bowel movement. Pt had loose bowel movement this AM. Bowel sounds audible on all quadrants. Pt denies nausea.   Diet/appetite: Pt receiving tube feeds via NJ tube at 40 mL/hr. Pt also on mince/moist (level 5) diet.   Activity: Lift assist  Pain: Pt verbalized left hip/left lower abdomen pain rating 2/10. Pt was repositioned and this pain was alleviated. Pt had left hip pain this PM. Pt was repositioned again and PRN oxycodone administered.   Skin: Abdomen incision (dressing changed this shift), Left forearm skin tear  LDA's: Right peripheral IV x2     Plan: Continue with POC. Notify vascular team with changes.

## 2023-11-03 NOTE — PLAN OF CARE
Hours of care 1900 - 0730    Changed: Critical hgb of 6.7, 1 unit of blood given.  PRN: Atarax, labetalol     Neuro: A&Ox4. Anxious/restless overnight.   Cardiac: VSS. Sinus tachy, rates 100-110s. MAPs >65.   Respiratory: RA sating >92%.  GI/: Voids spontaneously. One BM overnight.   Diet: Minced and moist, mildly thickened.   Skin: See adult PCS. No new deficits noted.  LDAs: 2 L PIVs.   Activity: Lift, assist of x2.   Pain: Denies.      A: VSS. Afebrile. Urinating adequately.      P: Will continue POC and report changes to treatment team.

## 2023-11-03 NOTE — PROVIDER NOTIFICATION
Time of notification: 4:20 PM  Provider notified: abhijit Carrasco  Patient status: pt wife stated pt suddenly became wide eyed and very agitated  Temp:  [97.7  F (36.5  C)-98.9  F (37.2  C)] 98.2  F (36.8  C)  Pulse:  [] 98  Resp:  [14-33] 15  BP: (119-166)/() 150/99  SpO2:  [96 %-100 %] 100 %  Orders received:  'please page first call'     Time of notification: 5:02 PM  Provider notified: ashleigh dickens   Patient status:  pt wife stated pt suddenly became wide eyed and very agitated -   Temp:  [97.7  F (36.5  C)-98.9  F (37.2  C)] 98.2  F (36.8  C)  Pulse:  [] 98  Resp:  [14-33] 15  BP: (119-166)/() 150/99  SpO2:  [96 %-100 %] 100 %  Orders received:  Provider called bedside RN

## 2023-11-03 NOTE — PROGRESS NOTES
"Hgb 6.7. Assessed patient on bedside. Patient resting comfortably. A&Ox4. BP (!) 145/81   Pulse 115   Temp 98.5  F (36.9  C) (Axillary)   Resp 17   Ht 1.727 m (5' 8\")   Wt 71.8 kg (158 lb 4.6 oz)   SpO2 98%   BMI 24.07 kg/m  .  Internal jugular CVC site looks ok; no local hematoma observed. Incision site ok-no bleeding. Nil output from vac.     Discussed with Dr Ada Donald. Ordered 1 U PRBC for transfusion.    Chiara HERRERA  PGY1 General Surgery  [facility]  Sur[facility]  "

## 2023-11-03 NOTE — PROGRESS NOTES
Vascular Surgery Progress Note  11/03/2023       Subjective:  Underwent removal of tunneled line with IR, hemoglobin of 6.7, up to 7.1. transfusing 1 unit of blood, chest x-ray clear, no signs or symptoms of bleeding. Serum creatinine continues to improve, this morning at 2.5, BUN at 90.2. Patient need 1.7 L of UO yesterday, already made 600 mL since midnight. Nephrology signed off. Cleared by SLP For IDDSI level 5 minced/moist diet with level 3 moderately thickened liquids (NO straws, must be sitting upright, alert).     Objective:  Temp:  [98  F (36.7  C)-98.5  F (36.9  C)] 98.5  F (36.9  C)  Pulse:  [103-115] 112  Resp:  [14-23] 18  BP: (119-163)/() 163/88  Cuff Mean (mmHg):  [89] 89  SpO2:  [96 %-100 %] 99 %    I/O last 3 completed shifts:  In: 1907.01 [P.O.:940; I.V.:232.01; NG/GT:215]  Out: 1825 [Urine:1825]      Gen: resting comfortably in bed, answering questions appropriately, no delirium/confusion, whispering words, not in acute distress  CV: tachycardic per tele with PVCs  Resp: non-labored, on room air   Abd: Abdominal incision c/d/I. No active bleeding, abdomen soft, non-tender  Ext: RLE wwp, palpable DP pulse, LLE stump incision with continued improved surrounding erythema similar to erythema of entire body, no obvious bleeding, soft, incisional prevena holding seal.    Prevena LLE placed on 10/30/23 - to be replaced in 5-7 days  Skin: Entire body with blanching erythema covering trunk arms, and legs.     Labs:  Recent Labs   Lab 11/03/23  0501 11/03/23  0152 11/02/23  2106 11/02/23  0547 11/01/23  0500   WBC 7.0  --   --  8.2 6.8   HGB 7.1* 6.7* 7.0* 8.2* 7.6*     --   --  183 162       Recent Labs   Lab 11/03/23  0501 11/02/23  0547 11/01/23  0500    142 142   POTASSIUM 4.5 4.2 3.0*   CHLORIDE 105 105 102   CO2 24 23 25   BUN 90.2* 101.0* 108.0*   CR 2.50* 2.84* 3.17*   * 137* 118*   OMAR 9.6 9.4 9.1   MAG 2.2 2.4* 2.5*   PHOS 3.8 4.7* 4.8*      Assessment/Plan:   Alfredo Musa  Tej is a 68 year old male with PMH of HTN and previous TIA who presented with R sided abdominal pain found to have contained ruptured AAA, now s/p open AAA repair 9/24 into 9/25am with 30 min supraceliac clamp time, prolonged inter-renal clamp time, temporary abdominal closure. He did have bloody stool postop with flex sig 9/25 demonstrating ischemic mucosal changes of the rectum, repeated abdominal without evidence of transmural necrosis of the small or large intestine, or the rectum. On 9/29 he was started on CRRT and subsequently iHD. S/p closure of abdominal fascia and subcutaneous tissue wound VAC applied 10/2/23. 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 On 10/20/23 underwent abdominal incision closure.     10/24/23 found to have hgb 5.8, CTA demonstrated left psoas muscle hematoma now size 6 cm x 3 cm x 3 cm, barely seen on CTA from 10/13. Lesser sac of the peritoneal cavity collection stable, hematoma in left iliac stable, no active extravasation. Hep gtt held.     Overnight 10/24-10/25 patient more lethargic with fevers at 102.1 thought to be strokelike symptoms, stroke code called, workup negative. Hemoglobin dropped again to 5.8, despite holding heparin for 24 hours. Repeat CTA: demonstrated hematoma in LLE stump, with otherwise stable left iliac hematoma and stable left psoas muscle hematoma. Now resoled: patient's hgb stable, he is progressing well with continued improvements. Off iHD with renal recovery, tunneled line removed and cleared by SLP for diet.      Neuro:   - Delirium/confusion resolved.  - Seroquel at bedtime and as needed for anxiety/delirium episodes  - Previously had stroke code called for unequal pupils, c/f facial droop 10/6: workup revealed: Numerous, punctate late hyperacute to acute infarctions thought to be embolic in nature. No embolic source identified in workup.  - Follow-up with neurology 4-6 weeks after discharge, ok with a/c.     HEENT:   - ENT consult for ongoing  dysphonia/dysphagia: endoscopy with L vocal cord paralysis. S/p bedside vocal fold injection 11/1/23 with otolaryngology.    CV:   - Started amlodipine 5mg daily 11/2/23 (at home on amlodipine 5mg and benazepril 20mg) due to SBP>160 requiring Labetalol IV. No benzepril for now, will add Metoprolol given ongoing tachycardia.   - Goal SBP <160, MAP >65  - Labetalol prn for SBP >160  - Continue statin  - PE+, venous duplex with nonocclusive to occlusive thrombus of the left GSV from the level of the knee to the groin and nonocclusive thrombus of the left distal femoral vein and popliteal veins, increased in extent compared to 9/30/2023.   - Heparin drip low intensity, with therapeutic anti-Xa, no plan to change to high intensity, appropriately anticoagulated.    Resp:   - On room air, CXR clear    GI:   - Concern for Pancreatitis with peripancreatic fluid collection on CT 10/13 with question of bleeding into the collection. GI signed off as collection not drainable. - Improved appearance on repeat CT 10/25. Monitoring.    - TF tolerating via NJT, plan to cycle TFs, begin callorie counts and ONS while monitoring PO intake  - Nutrition following, appreciate recommendations.  - Cleared by SLP For IDDSI level 5 minced/moist diet with level 3 moderately thickened liquids (NO straws, must be sitting upright, alert).  - Change abd dressing daily     FEN:   - Electrolyte replacement prn     Renal:   - BUN down to 90.2, Creat down to 2.5  - Last iHD 10/27. Lasix challenge 10/28 with great response. Continues to make good amount of urine, > 1.7L in the last 24 hours.   - Tunneled line removed with IR 11/2/23, nephrology signed off    Heme:   - Hemoglobin 6.7 overnight, received 1 unit PRBC this morning with appropriate response  - DVT as above, PE   - Heparin drip low intensity, with therapeutic anti-Xa. No plan to transition to high intensity, appropriately anticoagulated.    ID:   -No further fevers, no leukocytosis. Patient  has allergy to cefepime (hives). Continue to monitor for fevers, leukocytosis.  - monitor signs of pneumonia  - nystatin swish for oral candida    MSK:   - L AKA 10/17/23: prevena applied 10/30/23, to stay on for 5 to 7 days  - ACE wrap on L AKA at all times  - Orthotics provided stump protector and  11/1/23    Derm:   - Rash significantly improved, not cellulitis. Pharm reviewed meds, all low risk, derm consulted, ID has seen as well, all felt were likely drug induced presumably from cefepime  - Did receive 1 dose cefepime 10/25 - this may be cause of worsening. May take 1-2 weeks to clear up.  - Appreciate dermatology recs, likely morbilliform eruption vs DRESS (low risk)    - triamcinolone 0.1% changed back to BID scheduled     Misc:   - Pediatric/Small tubes for blood draws (spoke with lab, included this request in daily lab draws)  - Appreciate aggressive PT/OT ROM, patient severely deconditioned and requires extensive physical therapy.  - Abd binder on at all times.  - Please keep LLE AKA wrapped with ACE.  - IMC status.  - Anticipate will require ARU at discharge.    Discussed patient with vascular surgery staff who Is in agreement with the above.      Miryam Carrasco, SACHIN  Vascular Surgery  Pager: [phone]  [email or web address]  Send message or 10 digit call back number Securely via [employer] with the [employer] Web Console (learn more here)

## 2023-11-03 NOTE — PROVIDER NOTIFICATION
Time of notification: 2:25 AM  Provider notified: Critical hgb 6.7, no conditional transfuse orders.

## 2023-11-04 ENCOUNTER — APPOINTMENT (OUTPATIENT)
Dept: SPEECH THERAPY | Facility: CLINIC | Age: 68
DRG: 268 | End: 2023-11-04
Payer: COMMERCIAL

## 2023-11-04 LAB
ANION GAP SERPL CALCULATED.3IONS-SCNC: 13 MMOL/L (ref 7–15)
BUN SERPL-MCNC: 84.4 MG/DL (ref 8–23)
CALCIUM SERPL-MCNC: 9.5 MG/DL (ref 8.8–10.2)
CHLORIDE SERPL-SCNC: 105 MMOL/L (ref 98–107)
CREAT SERPL-MCNC: 2.24 MG/DL (ref 0.67–1.17)
DEPRECATED HCO3 PLAS-SCNC: 23 MMOL/L (ref 22–29)
EGFRCR SERPLBLD CKD-EPI 2021: 31 ML/MIN/1.73M2
ERYTHROCYTE [DISTWIDTH] IN BLOOD BY AUTOMATED COUNT: 16.2 % (ref 10–15)
GLUCOSE SERPL-MCNC: 112 MG/DL (ref 70–99)
HCT VFR BLD AUTO: 25.2 % (ref 40–53)
HGB BLD-MCNC: 7.8 G/DL (ref 13.3–17.7)
MAGNESIUM SERPL-MCNC: 2.1 MG/DL (ref 1.7–2.3)
MCH RBC QN AUTO: 29.7 PG (ref 26.5–33)
MCHC RBC AUTO-ENTMCNC: 31 G/DL (ref 31.5–36.5)
MCV RBC AUTO: 96 FL (ref 78–100)
PHOSPHATE SERPL-MCNC: 4.1 MG/DL (ref 2.5–4.5)
PLATELET # BLD AUTO: 173 10E3/UL (ref 150–450)
POTASSIUM SERPL-SCNC: 4.8 MMOL/L (ref 3.4–5.3)
RBC # BLD AUTO: 2.63 10E6/UL (ref 4.4–5.9)
SODIUM SERPL-SCNC: 141 MMOL/L (ref 135–145)
UFH PPP CHRO-ACNC: 0.32 IU/ML
UFH PPP CHRO-ACNC: 0.35 IU/ML
WBC # BLD AUTO: 7.1 10E3/UL (ref 4–11)

## 2023-11-04 PROCEDURE — 85520 HEPARIN ASSAY: CPT | Performed by: SURGERY

## 2023-11-04 PROCEDURE — 36415 COLL VENOUS BLD VENIPUNCTURE: CPT | Performed by: SURGERY

## 2023-11-04 PROCEDURE — 250N000011 HC RX IP 250 OP 636: Mod: JZ | Performed by: NURSE PRACTITIONER

## 2023-11-04 PROCEDURE — 250N000013 HC RX MED GY IP 250 OP 250 PS 637: Performed by: SURGERY

## 2023-11-04 PROCEDURE — 80048 BASIC METABOLIC PNL TOTAL CA: CPT | Performed by: NURSE PRACTITIONER

## 2023-11-04 PROCEDURE — 85520 HEPARIN ASSAY: CPT | Performed by: INTERNAL MEDICINE

## 2023-11-04 PROCEDURE — 85027 COMPLETE CBC AUTOMATED: CPT | Performed by: NURSE PRACTITIONER

## 2023-11-04 PROCEDURE — 83735 ASSAY OF MAGNESIUM: CPT

## 2023-11-04 PROCEDURE — 120N000003 HC R&B IMCU UMMC

## 2023-11-04 PROCEDURE — 92526 ORAL FUNCTION THERAPY: CPT | Mod: GN

## 2023-11-04 PROCEDURE — 36415 COLL VENOUS BLD VENIPUNCTURE: CPT | Performed by: NURSE PRACTITIONER

## 2023-11-04 PROCEDURE — 84100 ASSAY OF PHOSPHORUS: CPT | Performed by: NURSE PRACTITIONER

## 2023-11-04 PROCEDURE — 250N000013 HC RX MED GY IP 250 OP 250 PS 637: Performed by: NURSE PRACTITIONER

## 2023-11-04 PROCEDURE — 36415 COLL VENOUS BLD VENIPUNCTURE: CPT | Performed by: INTERNAL MEDICINE

## 2023-11-04 PROCEDURE — 250N000013 HC RX MED GY IP 250 OP 250 PS 637

## 2023-11-04 RX ADMIN — QUETIAPINE FUMARATE 50 MG: 50 TABLET ORAL at 21:02

## 2023-11-04 RX ADMIN — HYDROCORTISONE: 25 OINTMENT TOPICAL at 08:01

## 2023-11-04 RX ADMIN — HEPARIN SODIUM 1400 UNITS/HR: 10000 INJECTION, SOLUTION INTRAVENOUS at 16:31

## 2023-11-04 RX ADMIN — TRIAMCINOLONE ACETONIDE: 1 OINTMENT TOPICAL at 21:08

## 2023-11-04 RX ADMIN — Medication 1 PACKET: at 10:53

## 2023-11-04 RX ADMIN — ACETAMINOPHEN 975 MG: 325 TABLET, FILM COATED ORAL at 21:49

## 2023-11-04 RX ADMIN — Medication 1 MG: at 17:27

## 2023-11-04 RX ADMIN — ACETAMINOPHEN 975 MG: 325 TABLET, FILM COATED ORAL at 15:59

## 2023-11-04 RX ADMIN — ACETAMINOPHEN 975 MG: 325 TABLET, FILM COATED ORAL at 10:53

## 2023-11-04 RX ADMIN — ATORVASTATIN CALCIUM 10 MG: 10 TABLET, FILM COATED ORAL at 21:02

## 2023-11-04 RX ADMIN — QUETIAPINE FUMARATE 50 MG: 50 TABLET ORAL at 07:50

## 2023-11-04 RX ADMIN — METOPROLOL TARTRATE 25 MG: 25 TABLET, FILM COATED ORAL at 21:02

## 2023-11-04 RX ADMIN — AMLODIPINE BESYLATE 5 MG: 5 TABLET ORAL at 07:50

## 2023-11-04 RX ADMIN — Medication 60 ML: at 07:51

## 2023-11-04 RX ADMIN — HYDROCORTISONE: 25 OINTMENT TOPICAL at 21:07

## 2023-11-04 RX ADMIN — HYDROXYZINE HYDROCHLORIDE 50 MG: 50 TABLET ORAL at 15:59

## 2023-11-04 RX ADMIN — HYDROXYZINE HYDROCHLORIDE 50 MG: 50 TABLET ORAL at 06:19

## 2023-11-04 RX ADMIN — Medication 40 MG: at 07:50

## 2023-11-04 RX ADMIN — QUETIAPINE FUMARATE 100 MG: 100 TABLET ORAL at 21:49

## 2023-11-04 RX ADMIN — TRIAMCINOLONE ACETONIDE: 1 OINTMENT TOPICAL at 08:01

## 2023-11-04 RX ADMIN — METOPROLOL TARTRATE 25 MG: 25 TABLET, FILM COATED ORAL at 07:50

## 2023-11-04 ASSESSMENT — ACTIVITIES OF DAILY LIVING (ADL)
ADLS_ACUITY_SCORE: 47
ADLS_ACUITY_SCORE: 49
ADLS_ACUITY_SCORE: 47
ADLS_ACUITY_SCORE: 51
ADLS_ACUITY_SCORE: 49
ADLS_ACUITY_SCORE: 47
ADLS_ACUITY_SCORE: 49
ADLS_ACUITY_SCORE: 51
ADLS_ACUITY_SCORE: 49
ADLS_ACUITY_SCORE: 47

## 2023-11-04 NOTE — PROGRESS NOTES
Vascular Surgery Progress Note  11/04/2023       Subjective:  Doing OK this AM. Hgb responded appropriately and fairly stable. Some left leg AKA stump pain.    Objective:  Temp:  [98.1  F (36.7  C)-98.6  F (37  C)] 98.6  F (37  C)  Pulse:  [] 102  Resp:  [14-34] 16  BP: (146-175)/() 151/102  SpO2:  [95 %-99 %] 96 %    I/O last 3 completed shifts:  In: 1414.63 [I.V.:264.63; NG/GT:210]  Out: 1200 [Urine:1200]      Gen: resting comfortably in bed, answering questions appropriately, no delirium/confusion, whispering words, not in acute distress  CV: tachycardic per tele with PVCs  Resp: non-labored, on room air   Abd: Abdominal incision c/d/I. No active bleeding, abdomen soft, non-tender  Ext: RLE wwp, palpable DP pulse, LLE stump incision with continued improved surrounding erythema similar to erythema of entire body, no obvious bleeding, soft, incisional prevena holding seal.    Prevena LLE placed on 10/30/23 - to be replaced in 5-7 days  Skin: Entire body with blanching erythema covering trunk arms, and legs.     Labs:  Recent Labs   Lab 11/04/23 0416 11/03/23  1235 11/03/23  0501 11/02/23 2106 11/02/23  0547   WBC 7.1  --  7.0  --  8.2   HGB 7.8* 8.1* 7.1*   < > 8.2*     --  179  --  183    < > = values in this interval not displayed.       Recent Labs   Lab 11/04/23 0416 11/03/23  1942 11/03/23  0501 11/02/23  0547     --  142 142   POTASSIUM 4.8  --  4.5 4.2   CHLORIDE 105  --  105 105   CO2 23 -- 24 23   BUN 84.4*  --  90.2* 101.0*   CR 2.24*  --  2.50* 2.84*   * 140* 141* 137*   OMAR 9.5  --  9.6 9.4   MAG 2.1  --  2.2 2.4*   PHOS 4.1  --  3.8 4.7*      Assessment/Plan:   Alfredo Burnham is a 68 year old male with PMH of HTN and previous TIA who presented with R sided abdominal pain found to have contained ruptured AAA, now s/p open AAA repair 9/24 into 9/25am with 30 min supraceliac clamp time, prolonged inter-renal clamp time, temporary abdominal closure. He did have  bloody stool postop with flex sig 9/25 demonstrating ischemic mucosal changes of the rectum, repeated abdominal without evidence of transmural necrosis of the small or large intestine, or the rectum. On 9/29 he was started on CRRT and subsequently iHD. S/p closure of abdominal fascia and subcutaneous tissue wound VAC applied 10/2/23. 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 On 10/20/23 underwent abdominal incision closure.     10/24/23 found to have hgb 5.8, CTA demonstrated left psoas muscle hematoma now size 6 cm x 3 cm x 3 cm, barely seen on CTA from 10/13. Lesser sac of the peritoneal cavity collection stable, hematoma in left iliac stable, no active extravasation. Hep gtt held.     Overnight 10/24-10/25 patient more lethargic with fevers at 102.1 thought to be strokelike symptoms, stroke code called, workup negative. Hemoglobin dropped again to 5.8, despite holding heparin for 24 hours. Repeat CTA: demonstrated hematoma in LLE stump, with otherwise stable left iliac hematoma and stable left psoas muscle hematoma. Now resoled: patient's hgb stable, he is progressing well with continued improvements. Off iHD with renal recovery, tunneled line removed and cleared by SLP for diet.      Neuro:   - Delirium/confusion resolved.  - Seroquel at bedtime and as needed for anxiety/delirium episodes  - Previously had stroke code called for unequal pupils, c/f facial droop 10/6: workup revealed: Numerous, punctate late hyperacute to acute infarctions thought to be embolic in nature. No embolic source identified in workup.  - Follow-up with neurology 4-6 weeks after discharge, ok with a/c.     HEENT:   - ENT consult for ongoing dysphonia/dysphagia: endoscopy with L vocal cord paralysis. S/p bedside vocal fold injection 11/1/23 with otolaryngology.    CV:   - Started amlodipine 5mg daily 11/2/23 (at home on amlodipine 5mg and benazepril 20mg) due to SBP>160 requiring Labetalol IV. No benzepril for now, will add Metoprolol given  ongoing tachycardia.   - Goal SBP <160, MAP >65  - Labetalol prn for SBP >160  - Continue statin  - PE+, venous duplex with nonocclusive to occlusive thrombus of the left GSV from the level of the knee to the groin and nonocclusive thrombus of the left distal femoral vein and popliteal veins, increased in extent compared to 9/30/2023.   - Heparin drip low intensity, with therapeutic anti-Xa, no plan to change to high intensity, appropriately anticoagulated.    Resp:   - On room air, CXR clear    GI:   - Concern for Pancreatitis with peripancreatic fluid collection on CT 10/13 with question of bleeding into the collection. GI signed off as collection not drainable. - Improved appearance on repeat CT 10/25. Monitoring.    - TF tolerating via NJT, getting nocturnal feeds  - Nutrition following, appreciate recommendations, on calorie count.  - Cleared by SLP For IDDSI level 5 minced/moist diet with level 3 moderately thickened liquids (NO straws, must be sitting upright, alert).  - Change abd dressing daily     FEN:   - Electrolyte replacement prn     Renal:   - Cr downtrending 2.24  - Last iHD 10/27. Lasix challenge 10/28 with great response. Continues to make good amount of urine, > 1.7L in the last 24 hours.   - Tunneled line removed with IR 11/2/23, nephrology signed off    Heme:   - Hgb 7.8  - DVT as above, PE   - Heparin drip low intensity, with therapeutic anti-Xa. No plan to transition to high intensity, appropriately anticoagulated.    ID:   -No further fevers, no leukocytosis. Patient has allergy to cefepime (hives). Continue to monitor for fevers, leukocytosis.  - monitor signs of pneumonia  - nystatin swish for oral candida    MSK:   - L AKA 10/17/23: prevena applied 10/30/23, to stay on for 5 to 7 days  - ACE wrap on L AKA at all times  - Orthotics provided stump protector and  11/1/23    Derm:   - Rash significantly improved, not cellulitis. Pharm reviewed meds, all low risk, derm consulted, ID has  seen as well, all felt were likely drug induced presumably from cefepime  - Did receive 1 dose cefepime 10/25 - this may be cause of worsening. May take 1-2 weeks to clear up.  - Appreciate dermatology recs, likely morbilliform eruption vs DRESS (low risk)    - triamcinolone 0.1% changed back to BID scheduled     Misc:   - Pediatric/Small tubes for blood draws (spoke with lab, included this request in daily lab draws)  - Appreciate aggressive PT/OT ROM, patient severely deconditioned and requires extensive physical therapy.  - Abd binder on at all times.  - Please keep LLE AKA wrapped with ACE.  - IMC status.  - Anticipate will require ARU at discharge.    Discussed patient with vascular surgery staff who Is in agreement with the above.

## 2023-11-04 NOTE — PLAN OF CARE
Neuro: A&Ox4. Forgetful.   Cardiac: ST, HR 100s. SBP 150s. PRN labetalol and hydralazine available for SBP >160  Respiratory: Sating >93% on RA. Congested cough  GI/: Adequate urine output via urinal. C/o diarrhea.   Diet/appetite: Tolerating regular diet. Juvencio counts. Soft and minced diet, moderately thickened liquids. Cycled TF from 6p-8a @ 40mL/hr with 30mL FWF Q 4 hour via ND. Meds crushed and thru ND.  Activity:  Lift assist. L AKA.   Pain: At acceptable level on current regimen.   Skin: No new deficits noted. Abdominal wound dressing changed.   LDA's: Wound vac to L AKA @ -125 with 0 output. ND. 2x PIV    Plan: Continue with POC. Notify primary team with changes.

## 2023-11-04 NOTE — PROGRESS NOTES
"BP (!) 156/90 (BP Location: Right arm)   Pulse 108   Temp 97.7  F (36.5  C) (Oral)   Resp 22   Ht 1.727 m (5' 8\")   Wt 68.5 kg (151 lb 0.2 oz)   SpO2 96%   BMI 22.96 kg/m    Neuro: A&Ox4. Restless.  Cardiac: Afebrile, VSS.   Respiratory: RA   GI/: Voiding spontaneously. Small BM this shift.   Diet/appetite: Tolerating diet. Poor appetite. TF cycled. 6p-8a. 40ml/hr.Denies nausea   Activity: Up with lift    Pain: Denies   Skin: No new deficits noted.  Lines:PIV x2  Drains: Wound VAC, NJ      No new complaints.Will continue to monitor and follow plan of care.    "

## 2023-11-04 NOTE — PLAN OF CARE
Neuro: A&Ox4. PRN atarax given x2 for anxiety.Mentation was consistent throughout the night. Patient was able to sleep during the hours he was left alone.  Cardiac: STach - no PRN medications given for Increased BP, systolics 150s were highest.  Respiratory: Sating >92% on RA. Patient does have a slight productive cough, but otherwise no complaints.  GI/: Adequate urine output, one episode of incontinence and no BM.  Diet/appetite: Tolerating minced/moist diet with thickened liquids. Cycled TF from 6p-8a  Activity: Lift  Pain: At acceptable level on current regimen.   Skin: No new deficits noted. Skin is still peeling. ABD binder on.   LDA's: Nasoduodenal tube for meds and cycled TF. Wound vac with no drainage/output on (L)AKA site. 2xPIV - 1 saline locked, the other infusing heparin at 1400units/hr. Heparin had come back over therapeutic  so I paused for an hour and decreased by 200 units/hr per protocol.     Overall no concerns/changes this shift. Patient complained of anxiety but was able to calm down and get some sleep once atarax was given.

## 2023-11-04 NOTE — PROGRESS NOTES
I was paged by the RN at 5 pm that pt wife stated pt suddenly became wide eyed and very agitated at 4 pm; the episode was transient and patient was responsive and oriented throughout the episode as reported by the RN. Patient asymptomatic at the time I was paged. Vitals stable.   Continue to monitor.    Chiara HERRERA  PGY1 General Surgery  [facility]  [facility]

## 2023-11-05 ENCOUNTER — APPOINTMENT (OUTPATIENT)
Dept: PHYSICAL THERAPY | Facility: CLINIC | Age: 68
DRG: 268 | End: 2023-11-05
Payer: COMMERCIAL

## 2023-11-05 LAB
ANION GAP SERPL CALCULATED.3IONS-SCNC: 11 MMOL/L (ref 7–15)
BUN SERPL-MCNC: 75.4 MG/DL (ref 8–23)
CALCIUM SERPL-MCNC: 9.3 MG/DL (ref 8.8–10.2)
CHLORIDE SERPL-SCNC: 105 MMOL/L (ref 98–107)
CREAT SERPL-MCNC: 2.14 MG/DL (ref 0.67–1.17)
DEPRECATED HCO3 PLAS-SCNC: 22 MMOL/L (ref 22–29)
EGFRCR SERPLBLD CKD-EPI 2021: 32 ML/MIN/1.73M2
ERYTHROCYTE [DISTWIDTH] IN BLOOD BY AUTOMATED COUNT: 16.3 % (ref 10–15)
GLUCOSE SERPL-MCNC: 139 MG/DL (ref 70–99)
HCT VFR BLD AUTO: 27 % (ref 40–53)
HGB BLD-MCNC: 8.5 G/DL (ref 13.3–17.7)
MAGNESIUM SERPL-MCNC: 2 MG/DL (ref 1.7–2.3)
MCH RBC QN AUTO: 30.2 PG (ref 26.5–33)
MCHC RBC AUTO-ENTMCNC: 31.5 G/DL (ref 31.5–36.5)
MCV RBC AUTO: 96 FL (ref 78–100)
PHOSPHATE SERPL-MCNC: 4.3 MG/DL (ref 2.5–4.5)
PLATELET # BLD AUTO: 174 10E3/UL (ref 150–450)
POTASSIUM SERPL-SCNC: 4.6 MMOL/L (ref 3.4–5.3)
RBC # BLD AUTO: 2.81 10E6/UL (ref 4.4–5.9)
SODIUM SERPL-SCNC: 138 MMOL/L (ref 135–145)
UFH PPP CHRO-ACNC: 0.3 IU/ML
WBC # BLD AUTO: 8 10E3/UL (ref 4–11)

## 2023-11-05 PROCEDURE — 80048 BASIC METABOLIC PNL TOTAL CA: CPT | Performed by: NURSE PRACTITIONER

## 2023-11-05 PROCEDURE — 999N000128 HC STATISTIC PERIPHERAL IV START W/O US GUIDANCE

## 2023-11-05 PROCEDURE — 97110 THERAPEUTIC EXERCISES: CPT | Mod: GP | Performed by: PHYSICAL THERAPIST

## 2023-11-05 PROCEDURE — 85520 HEPARIN ASSAY: CPT | Performed by: SURGERY

## 2023-11-05 PROCEDURE — 250N000013 HC RX MED GY IP 250 OP 250 PS 637

## 2023-11-05 PROCEDURE — 120N000003 HC R&B IMCU UMMC

## 2023-11-05 PROCEDURE — 84100 ASSAY OF PHOSPHORUS: CPT | Performed by: NURSE PRACTITIONER

## 2023-11-05 PROCEDURE — 250N000013 HC RX MED GY IP 250 OP 250 PS 637: Performed by: SURGERY

## 2023-11-05 PROCEDURE — 85027 COMPLETE CBC AUTOMATED: CPT | Performed by: NURSE PRACTITIONER

## 2023-11-05 PROCEDURE — 250N000011 HC RX IP 250 OP 636: Performed by: NURSE PRACTITIONER

## 2023-11-05 PROCEDURE — 83735 ASSAY OF MAGNESIUM: CPT

## 2023-11-05 PROCEDURE — 250N000011 HC RX IP 250 OP 636: Mod: JZ | Performed by: NURSE PRACTITIONER

## 2023-11-05 PROCEDURE — 97530 THERAPEUTIC ACTIVITIES: CPT | Mod: GP | Performed by: PHYSICAL THERAPIST

## 2023-11-05 PROCEDURE — 250N000013 HC RX MED GY IP 250 OP 250 PS 637: Performed by: NURSE PRACTITIONER

## 2023-11-05 PROCEDURE — 36415 COLL VENOUS BLD VENIPUNCTURE: CPT | Performed by: NURSE PRACTITIONER

## 2023-11-05 RX ADMIN — LABETALOL HYDROCHLORIDE 10 MG: 5 INJECTION, SOLUTION INTRAVENOUS at 03:19

## 2023-11-05 RX ADMIN — HYDROXYZINE HYDROCHLORIDE 25 MG: 25 TABLET ORAL at 15:51

## 2023-11-05 RX ADMIN — LABETALOL HYDROCHLORIDE 20 MG: 5 INJECTION, SOLUTION INTRAVENOUS at 13:56

## 2023-11-05 RX ADMIN — ACETAMINOPHEN 975 MG: 325 TABLET, FILM COATED ORAL at 22:07

## 2023-11-05 RX ADMIN — METOPROLOL TARTRATE 25 MG: 25 TABLET, FILM COATED ORAL at 08:35

## 2023-11-05 RX ADMIN — QUETIAPINE FUMARATE 100 MG: 100 TABLET ORAL at 22:08

## 2023-11-05 RX ADMIN — Medication 60 ML: at 08:49

## 2023-11-05 RX ADMIN — Medication 1 PACKET: at 11:25

## 2023-11-05 RX ADMIN — HYDROXYZINE HYDROCHLORIDE 25 MG: 25 TABLET ORAL at 08:35

## 2023-11-05 RX ADMIN — TRIAMCINOLONE ACETONIDE: 1 OINTMENT TOPICAL at 08:38

## 2023-11-05 RX ADMIN — HEPARIN SODIUM 1400 UNITS/HR: 10000 INJECTION, SOLUTION INTRAVENOUS at 08:36

## 2023-11-05 RX ADMIN — ATORVASTATIN CALCIUM 10 MG: 10 TABLET, FILM COATED ORAL at 20:03

## 2023-11-05 RX ADMIN — HYDROCORTISONE: 25 OINTMENT TOPICAL at 20:02

## 2023-11-05 RX ADMIN — QUETIAPINE FUMARATE 50 MG: 50 TABLET ORAL at 08:35

## 2023-11-05 RX ADMIN — Medication 1 MG: at 17:26

## 2023-11-05 RX ADMIN — Medication 40 MG: at 08:38

## 2023-11-05 RX ADMIN — QUETIAPINE FUMARATE 50 MG: 50 TABLET ORAL at 20:03

## 2023-11-05 RX ADMIN — METOPROLOL TARTRATE 25 MG: 25 TABLET, FILM COATED ORAL at 20:03

## 2023-11-05 RX ADMIN — ACETAMINOPHEN 975 MG: 325 TABLET, FILM COATED ORAL at 11:25

## 2023-11-05 RX ADMIN — ACETAMINOPHEN 975 MG: 325 TABLET, FILM COATED ORAL at 03:19

## 2023-11-05 RX ADMIN — HEPARIN SODIUM 1400 UNITS/HR: 10000 INJECTION, SOLUTION INTRAVENOUS at 12:53

## 2023-11-05 RX ADMIN — OXYCODONE HYDROCHLORIDE 10 MG: 5 TABLET ORAL at 17:26

## 2023-11-05 RX ADMIN — ACETAMINOPHEN 975 MG: 325 TABLET, FILM COATED ORAL at 15:48

## 2023-11-05 RX ADMIN — HYDROXYZINE HYDROCHLORIDE 25 MG: 25 TABLET ORAL at 22:07

## 2023-11-05 RX ADMIN — HYDROCORTISONE: 25 OINTMENT TOPICAL at 08:53

## 2023-11-05 RX ADMIN — LABETALOL HYDROCHLORIDE 20 MG: 5 INJECTION, SOLUTION INTRAVENOUS at 08:45

## 2023-11-05 RX ADMIN — TRIAMCINOLONE ACETONIDE: 1 OINTMENT TOPICAL at 20:02

## 2023-11-05 RX ADMIN — AMLODIPINE BESYLATE 5 MG: 5 TABLET ORAL at 08:35

## 2023-11-05 ASSESSMENT — ACTIVITIES OF DAILY LIVING (ADL)
ADLS_ACUITY_SCORE: 49

## 2023-11-05 NOTE — PLAN OF CARE
Goal Outcome Evaluation:    Neuro: A&Ox4. Forgetful  Cardiac: ST. HR 100s. SBP 130s-150s. PRN labetalol and hydralazine available for SBP > 160. Labetalol given x 1.   Respiratory: Sating 93% on RA.  GI/: Adequate urine output via urinal. BM X1.   Diet/appetite: Tolerating Level 5 minced and moist diet. No straws. Poor appetite. Cycled TF from 6p-8a @ 40mL/hr with 30mL FWF Q 4 hour via ND. Meds crushed and thru ND.   Activity:  Assist of 1-2 in bed. 2 assist lift to or from chair. L AKA  Pain: At acceptable level on current regimen.   Skin: No new deficits noted.  LDA's: 2 R PIV, hep ggt @ 1400 unit/hr. Wound vac to L AKA @ -125 with 50ml output. ND.     Plan: Continue with POC. Notify primary team with changes.

## 2023-11-05 NOTE — PROGRESS NOTES
Calorie Count  Intake recorded for: 11/4  Total Kcals: 0 Total Protein: 0g  Kcals from Hospital Food: 0   Protein: 0g  Kcals from Outside Food (average):0 Protein: 0g  # Meals Ordered from Kitchen: 2 meals  # Meals Recorded: no food intake recorded  # Supplements Recorded: 0

## 2023-11-05 NOTE — PROGRESS NOTES
Vascular Surgery Progress Note  11/05/2023       Subjective:  Doing OK this AM. Hgb stable. Loose stools continue.    Objective:  Temp:  [97.6  F (36.4  C)-98  F (36.7  C)] 97.9  F (36.6  C)  Pulse:  [] 99  Resp:  [12-24] 14  BP: (137-181)/() 169/92  SpO2:  [96 %-99 %] 99 %    I/O last 3 completed shifts:  In: 1281.43 [I.V.:326.43; NG/GT:395]  Out: 2200 [Urine:2150; Drains:50]      Gen: resting comfortably in bed, answering questions appropriately, no delirium/confusion, whispering words, not in acute distress  CV: tachycardic per tele with PVCs  Resp: non-labored, on room air   Abd: Abdominal incision c/d/I. No active bleeding, abdomen soft, non-tender  Ext: RLE wwp, palpable DP pulse, LLE stump incision with continued improved surrounding erythema similar to erythema of entire body, no obvious bleeding, soft, incisional prevena holding seal.    Prevena LLE placed on 10/30/23 - to be replaced in 5-7 days  Skin: Entire body with blanching erythema covering trunk arms, and legs.     Labs:  Recent Labs   Lab 11/05/23  0529 11/04/23  0416 11/03/23  1235 11/03/23  0501   WBC 8.0 7.1  --  7.0   HGB 8.5* 7.8* 8.1* 7.1*    173  --  179       Recent Labs   Lab 11/05/23  0529 11/04/23  0416 11/03/23  1942 11/03/23  0501    141  --  142   POTASSIUM 4.6 4.8  --  4.5   CHLORIDE 105 105  --  105   CO2 22 23  --  24   BUN 75.4* 84.4*  --  90.2*   CR 2.14* 2.24*  --  2.50*   * 112* 140* 141*   OMAR 9.3 9.5  --  9.6   MAG 2.0 2.1  --  2.2   PHOS 4.3 4.1  --  3.8      Assessment/Plan:   Alfredo Burnham is a 68 year old male with PMH of HTN and previous TIA who presented with R sided abdominal pain found to have contained ruptured AAA, now s/p open AAA repair 9/24 into 9/25am with 30 min supraceliac clamp time, prolonged inter-renal clamp time, temporary abdominal closure. He did have bloody stool postop with flex sig 9/25 demonstrating ischemic mucosal changes of the rectum, repeated abdominal  without evidence of transmural necrosis of the small or large intestine, or the rectum. On 9/29 he was started on CRRT and subsequently iHD. S/p closure of abdominal fascia and subcutaneous tissue wound VAC applied 10/2/23. 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 On 10/20/23 underwent abdominal incision closure.     10/24/23 found to have hgb 5.8, CTA demonstrated left psoas muscle hematoma now size 6 cm x 3 cm x 3 cm, barely seen on CTA from 10/13. Lesser sac of the peritoneal cavity collection stable, hematoma in left iliac stable, no active extravasation. Hep gtt held.     Overnight 10/24-10/25 patient more lethargic with fevers at 102.1 thought to be strokelike symptoms, stroke code called, workup negative. Hemoglobin dropped again to 5.8, despite holding heparin for 24 hours. Repeat CTA: demonstrated hematoma in LLE stump, with otherwise stable left iliac hematoma and stable left psoas muscle hematoma. Now resoled: patient's hgb stable, he is progressing well with continued improvements. Off iHD with renal recovery, tunneled line removed and cleared by SLP for diet.      Neuro:   - Delirium/confusion resolved.  - Seroquel at bedtime and as needed for anxiety/delirium episodes  - Previously had stroke code called for unequal pupils, c/f facial droop 10/6: workup revealed: Numerous, punctate late hyperacute to acute infarctions thought to be embolic in nature. No embolic source identified in workup.  - Follow-up with neurology 4-6 weeks after discharge, ok with a/c.     HEENT:   - ENT consult for ongoing dysphonia/dysphagia: endoscopy with L vocal cord paralysis. S/p bedside vocal fold injection 11/1/23 with otolaryngology.    CV:   - Started amlodipine 5mg daily 11/2/23 (at home on amlodipine 5mg and benazepril 20mg) due to SBP>160 requiring Labetalol IV. No benzepril for now, will add Metoprolol given ongoing tachycardia.   - Goal SBP <160, MAP >65  - Labetalol prn for SBP >160  - Continue statin  - PE+, venous duplex  with nonocclusive to occlusive thrombus of the left GSV from the level of the knee to the groin and nonocclusive thrombus of the left distal femoral vein and popliteal veins, increased in extent compared to 9/30/2023.   - Heparin drip low intensity, with therapeutic anti-Xa, no plan to change to high intensity, appropriately anticoagulated.    Resp:   - On room air, CXR clear    GI:   - Concern for Pancreatitis with peripancreatic fluid collection on CT 10/13 with question of bleeding into the collection. GI signed off as collection not drainable. - Improved appearance on repeat CT 10/25. Monitoring.    - TF tolerating via NJT, getting nocturnal feeds  - Nutrition following, appreciate recommendations, on calorie count.  - Cleared by SLP For IDDSI level 5 minced/moist diet with level 3 moderately thickened liquids (NO straws, must be sitting upright, alert).  - Change abd dressing daily  - Loose stools -- C. Diff testing and if negative, will add psyllium and/or imodium.    FEN:   - Electrolyte replacement prn     Renal:   - Cr downtrending 2.24  - Last iHD 10/27. Lasix challenge 10/28 with great response. Continues to make good amount of urine, > 1.7L in the last 24 hours.   - Tunneled line removed with IR 11/2/23, nephrology signed off    Heme:   - Hgb 7.8  - DVT as above, PE   - Heparin drip low intensity, with therapeutic anti-Xa. No plan to transition to high intensity, appropriately anticoagulated.    ID:   -No further fevers, no leukocytosis. Patient has allergy to cefepime (hives). Continue to monitor for fevers, leukocytosis.  - monitor signs of pneumonia  - nystatin swish for oral candida    MSK:   - L AKA 10/17/23: prevena applied 10/30/23, to stay on for 5 to 7 days  - ACE wrap on L AKA at all times  - Orthotics provided stump protector and  11/1/23    Derm:   - Rash significantly improved, not cellulitis. Pharm reviewed meds, all low risk, derm consulted, ID has seen as well, all felt were  likely drug induced presumably from cefepime  - Did receive 1 dose cefepime 10/25 - this may be cause of worsening. May take 1-2 weeks to clear up.  - Appreciate dermatology recs, likely morbilliform eruption vs DRESS (low risk)    - triamcinolone 0.1% changed back to BID scheduled     Misc:   - Pediatric/Small tubes for blood draws (spoke with lab, included this request in daily lab draws)  - Appreciate aggressive PT/OT ROM, patient severely deconditioned and requires extensive physical therapy.  - Abd binder on at all times.  - Please keep LLE AKA wrapped with ACE.  - IMC status.  - Anticipate will require ARU at discharge.    Discussed patient with vascular surgery staff who Is in agreement with the above.

## 2023-11-05 NOTE — PLAN OF CARE
Neuro: A&Ox4.  Follow command anxious gave atarax x2.   Cardiac: SR. VSS. BP <160 SBP during shift gave labetalol.    Respiratory: Sating >95% on RA.   GI/: Adequate urine output. BM X4 Loose stool need C-diff sample. MD aware of stools   Diet/appetite: Tolerating minced and moist diet. Cycle TF 1800-8a  @ goal Rate 40 ml/hr w/ 30 ml FWF. Med crush via ND Tube. Patient tolerated well.   Activity:  Lift A2. L AKA.   Pain: At acceptable level on current regimen.   Skin: No new deficits noted.  LDA's: wound vac 125 mm hg. 2 RT PIV hep infusing 1400 ml/hr.     Plan: Continue with POC. Notify primary team with changes.

## 2023-11-05 NOTE — PROGRESS NOTES
Paged for 3 episodes of loose stools.    Patient seen at bedside. Eating breakfast w/o distress.  Patient reports loose stools for 1 week, however is forgetful.     Abdomen distended, non-tender.     WBC nml. No recent C diff + cxs.    Plan for c-diff cx and will treat/manage loose stools pending results.    Fellow aware.

## 2023-11-06 ENCOUNTER — APPOINTMENT (OUTPATIENT)
Dept: OCCUPATIONAL THERAPY | Facility: CLINIC | Age: 68
DRG: 268 | End: 2023-11-06
Payer: COMMERCIAL

## 2023-11-06 ENCOUNTER — APPOINTMENT (OUTPATIENT)
Dept: SPEECH THERAPY | Facility: CLINIC | Age: 68
DRG: 268 | End: 2023-11-06
Payer: COMMERCIAL

## 2023-11-06 ENCOUNTER — APPOINTMENT (OUTPATIENT)
Dept: PHYSICAL THERAPY | Facility: CLINIC | Age: 68
DRG: 268 | End: 2023-11-06
Payer: COMMERCIAL

## 2023-11-06 LAB
ANION GAP SERPL CALCULATED.3IONS-SCNC: 11 MMOL/L (ref 7–15)
BUN SERPL-MCNC: 69.8 MG/DL (ref 8–23)
C DIFF TOX B STL QL: NEGATIVE
CALCIUM SERPL-MCNC: 9.2 MG/DL (ref 8.8–10.2)
CHLORIDE SERPL-SCNC: 105 MMOL/L (ref 98–107)
CREAT SERPL-MCNC: 2.12 MG/DL (ref 0.67–1.17)
DEPRECATED HCO3 PLAS-SCNC: 22 MMOL/L (ref 22–29)
EGFRCR SERPLBLD CKD-EPI 2021: 33 ML/MIN/1.73M2
ERYTHROCYTE [DISTWIDTH] IN BLOOD BY AUTOMATED COUNT: 16.3 % (ref 10–15)
GLUCOSE SERPL-MCNC: 134 MG/DL (ref 70–99)
HCT VFR BLD AUTO: 25.3 % (ref 40–53)
HGB BLD-MCNC: 7.8 G/DL (ref 13.3–17.7)
MAGNESIUM SERPL-MCNC: 2 MG/DL (ref 1.7–2.3)
MCH RBC QN AUTO: 30.5 PG (ref 26.5–33)
MCHC RBC AUTO-ENTMCNC: 30.8 G/DL (ref 31.5–36.5)
MCV RBC AUTO: 99 FL (ref 78–100)
PHOSPHATE SERPL-MCNC: 5.2 MG/DL (ref 2.5–4.5)
PLATELET # BLD AUTO: 158 10E3/UL (ref 150–450)
POTASSIUM SERPL-SCNC: 4.8 MMOL/L (ref 3.4–5.3)
RBC # BLD AUTO: 2.56 10E6/UL (ref 4.4–5.9)
SODIUM SERPL-SCNC: 138 MMOL/L (ref 135–145)
UFH PPP CHRO-ACNC: 0.31 IU/ML
WBC # BLD AUTO: 7.8 10E3/UL (ref 4–11)

## 2023-11-06 PROCEDURE — 85520 HEPARIN ASSAY: CPT | Performed by: SURGERY

## 2023-11-06 PROCEDURE — 250N000011 HC RX IP 250 OP 636: Mod: JZ | Performed by: NURSE PRACTITIONER

## 2023-11-06 PROCEDURE — 36415 COLL VENOUS BLD VENIPUNCTURE: CPT | Performed by: NURSE PRACTITIONER

## 2023-11-06 PROCEDURE — 92526 ORAL FUNCTION THERAPY: CPT | Mod: GN

## 2023-11-06 PROCEDURE — 97530 THERAPEUTIC ACTIVITIES: CPT | Mod: GO

## 2023-11-06 PROCEDURE — 83735 ASSAY OF MAGNESIUM: CPT

## 2023-11-06 PROCEDURE — 80048 BASIC METABOLIC PNL TOTAL CA: CPT | Performed by: NURSE PRACTITIONER

## 2023-11-06 PROCEDURE — 97110 THERAPEUTIC EXERCISES: CPT | Mod: GP

## 2023-11-06 PROCEDURE — 120N000003 HC R&B IMCU UMMC

## 2023-11-06 PROCEDURE — 250N000011 HC RX IP 250 OP 636: Performed by: NURSE PRACTITIONER

## 2023-11-06 PROCEDURE — 250N000013 HC RX MED GY IP 250 OP 250 PS 637

## 2023-11-06 PROCEDURE — 87493 C DIFF AMPLIFIED PROBE: CPT

## 2023-11-06 PROCEDURE — 85027 COMPLETE CBC AUTOMATED: CPT | Performed by: NURSE PRACTITIONER

## 2023-11-06 PROCEDURE — 250N000013 HC RX MED GY IP 250 OP 250 PS 637: Performed by: SURGERY

## 2023-11-06 PROCEDURE — 250N000013 HC RX MED GY IP 250 OP 250 PS 637: Performed by: STUDENT IN AN ORGANIZED HEALTH CARE EDUCATION/TRAINING PROGRAM

## 2023-11-06 PROCEDURE — 97110 THERAPEUTIC EXERCISES: CPT | Mod: GO

## 2023-11-06 PROCEDURE — 97530 THERAPEUTIC ACTIVITIES: CPT | Mod: GP

## 2023-11-06 PROCEDURE — 84100 ASSAY OF PHOSPHORUS: CPT | Performed by: NURSE PRACTITIONER

## 2023-11-06 PROCEDURE — 250N000013 HC RX MED GY IP 250 OP 250 PS 637: Performed by: NURSE PRACTITIONER

## 2023-11-06 RX ORDER — MAGNESIUM OXIDE 400 MG/1
400 TABLET ORAL EVERY 4 HOURS
Status: COMPLETED | OUTPATIENT
Start: 2023-11-06 | End: 2023-11-06

## 2023-11-06 RX ORDER — LOPERAMIDE HCL 2 MG
4 CAPSULE ORAL 4 TIMES DAILY PRN
Status: DISCONTINUED | OUTPATIENT
Start: 2023-11-06 | End: 2023-11-08

## 2023-11-06 RX ORDER — MAGNESIUM OXIDE 400 MG/1
400 TABLET ORAL EVERY 4 HOURS
Status: DISCONTINUED | OUTPATIENT
Start: 2023-11-06 | End: 2023-11-06

## 2023-11-06 RX ORDER — AMLODIPINE BESYLATE 10 MG/1
10 TABLET ORAL DAILY
Status: DISCONTINUED | OUTPATIENT
Start: 2023-11-07 | End: 2023-11-17 | Stop reason: HOSPADM

## 2023-11-06 RX ORDER — AMLODIPINE BESYLATE 5 MG/1
5 TABLET ORAL ONCE
Status: COMPLETED | OUTPATIENT
Start: 2023-11-06 | End: 2023-11-06

## 2023-11-06 RX ORDER — METOPROLOL TARTRATE 50 MG
50 TABLET ORAL 2 TIMES DAILY
Status: DISCONTINUED | OUTPATIENT
Start: 2023-11-06 | End: 2023-11-07

## 2023-11-06 RX ADMIN — QUETIAPINE FUMARATE 50 MG: 50 TABLET ORAL at 20:33

## 2023-11-06 RX ADMIN — ACETAMINOPHEN 975 MG: 325 TABLET, FILM COATED ORAL at 03:59

## 2023-11-06 RX ADMIN — TRIAMCINOLONE ACETONIDE: 1 OINTMENT TOPICAL at 20:34

## 2023-11-06 RX ADMIN — MAGNESIUM OXIDE TAB 400 MG (241.3 MG ELEMENTAL MG) 400 MG: 400 (241.3 MG) TAB at 08:04

## 2023-11-06 RX ADMIN — ACETAMINOPHEN 975 MG: 325 TABLET, FILM COATED ORAL at 10:57

## 2023-11-06 RX ADMIN — ACETAMINOPHEN 975 MG: 325 TABLET, FILM COATED ORAL at 15:28

## 2023-11-06 RX ADMIN — ACETAMINOPHEN 975 MG: 325 TABLET, FILM COATED ORAL at 22:36

## 2023-11-06 RX ADMIN — TRIAMCINOLONE ACETONIDE: 1 OINTMENT TOPICAL at 08:12

## 2023-11-06 RX ADMIN — Medication 1 MG: at 18:24

## 2023-11-06 RX ADMIN — HYDROXYZINE HYDROCHLORIDE 50 MG: 50 TABLET ORAL at 15:28

## 2023-11-06 RX ADMIN — MAGNESIUM OXIDE TAB 400 MG (241.3 MG ELEMENTAL MG) 400 MG: 400 (241.3 MG) TAB at 11:06

## 2023-11-06 RX ADMIN — AMLODIPINE BESYLATE 5 MG: 5 TABLET ORAL at 10:58

## 2023-11-06 RX ADMIN — HEPARIN SODIUM 1400 UNITS/HR: 10000 INJECTION, SOLUTION INTRAVENOUS at 03:59

## 2023-11-06 RX ADMIN — ATORVASTATIN CALCIUM 10 MG: 10 TABLET, FILM COATED ORAL at 20:33

## 2023-11-06 RX ADMIN — LOPERAMIDE HYDROCHLORIDE 4 MG: 2 CAPSULE ORAL at 13:29

## 2023-11-06 RX ADMIN — QUETIAPINE FUMARATE 100 MG: 100 TABLET ORAL at 22:36

## 2023-11-06 RX ADMIN — AMLODIPINE BESYLATE 5 MG: 5 TABLET ORAL at 08:08

## 2023-11-06 RX ADMIN — Medication 40 MG: at 08:10

## 2023-11-06 RX ADMIN — OXYCODONE HYDROCHLORIDE 5 MG: 5 TABLET ORAL at 17:00

## 2023-11-06 RX ADMIN — LABETALOL HYDROCHLORIDE 10 MG: 5 INJECTION, SOLUTION INTRAVENOUS at 08:31

## 2023-11-06 RX ADMIN — QUETIAPINE FUMARATE 50 MG: 50 TABLET ORAL at 08:08

## 2023-11-06 RX ADMIN — METOPROLOL TARTRATE 50 MG: 50 TABLET, FILM COATED ORAL at 08:06

## 2023-11-06 RX ADMIN — Medication 60 ML: at 08:12

## 2023-11-06 RX ADMIN — HEPARIN SODIUM 1400 UNITS/HR: 10000 INJECTION, SOLUTION INTRAVENOUS at 22:37

## 2023-11-06 RX ADMIN — Medication 1 PACKET: at 10:58

## 2023-11-06 RX ADMIN — LABETALOL HYDROCHLORIDE 10 MG: 5 INJECTION, SOLUTION INTRAVENOUS at 04:13

## 2023-11-06 RX ADMIN — METOPROLOL TARTRATE 50 MG: 50 TABLET, FILM COATED ORAL at 20:33

## 2023-11-06 ASSESSMENT — ACTIVITIES OF DAILY LIVING (ADL)
ADLS_ACUITY_SCORE: 46
ADLS_ACUITY_SCORE: 49
ADLS_ACUITY_SCORE: 46
ADLS_ACUITY_SCORE: 49
ADLS_ACUITY_SCORE: 46
ADLS_ACUITY_SCORE: 47
ADLS_ACUITY_SCORE: 49
ADLS_ACUITY_SCORE: 47
ADLS_ACUITY_SCORE: 46
ADLS_ACUITY_SCORE: 49

## 2023-11-06 NOTE — PLAN OF CARE
Neuro: A&Ox4. Anxious at times. PRN Atarax given x1.  Cardiac: SR/ST with HR's 's. Hypertensive (150-160's/90's). BP meds adjusted. SBP goal <160. PRN labetalol given x1. Afebrile.   Respiratory: Sating >92% on RA. LS clear/diminished.  GI/: Adequate UOP via urinal. No BM. Stool sample sent; C-diff negative. PRN Imodium available.  Diet/appetite: Advanced to Minced and Moist (Level 3) diet + mildly thick liquids. Must have 1:1 supervision. No straws. Poor appetite. Ate 2 bites of oatmeal for breakfast and a couple bites of tapioca pudding for snack. On abdiel counts (11/4-11/6). Cycled TF's (6P-8A) at goal, 40mL/hr with 30mL FWF q4hrs.  Activity: Assist of 2 + lift, up to chair x1. Encouraged OOB activity. Worked w/PT.  Pain: C/o generalized pain. Scheduled Tylenol given. PRN Oxy given x1 given for incisional pain.  Skin: L AKA, closed with wound vac to -125 suction; Small serosang OP. Daily midsternal incision wound care done per Vascular Surgery.  LDA's: NJ - TF's/meds. R PIV - heparin gtt at 1,400u/hr, therapeutic, recheck AM. L PIV - SL.    Plan: Mg (2.0) replaced, recheck AM. Will need to transition to PO AC prior to discharge. ARU vs TCU. Will continue with POC and notify primary team with any changes.

## 2023-11-06 NOTE — PLAN OF CARE
Goal Outcome Evaluation:         Neuro: A&Ox4. Forgetful and anxious at times.   Cardiac: ST. HR 100s. SBP 130s-150s. PRN labetalol and hydralazine available for SBP > 160. Labetalol given x 1.   Respiratory: Sating 93% on RA.  GI/: Adequate urine output via urinal. No BM this shift.   Diet/appetite: Tolerating Level 5 minced and moist diet. No straws. Poor appetite. Cycled TF from 6p-8a @ 40mL/hr with 30mL FWF Q 4 hour via ND. Meds crushed and thru ND.   Activity:  Assist of 1-2 in bed. 2 assist lift to or from chair. L AKA  Pain: At acceptable level on current regimen.   Skin: No new deficits noted. Scratched R neck scab off. Cleaned and primipore applied.    LDA's: 2 R PIV, hep ggt @ 1400 unit/hr, therapeutic 4am Xa tomorrow. Wound vac to L AKA @ -125 with 50ml output. ND.       Plan: Continue with POC. Notify primary team with changes.

## 2023-11-06 NOTE — PROGRESS NOTES
Vascular Surgery Progress Note  11/06/2023       Subjective:  Required additional hydralazine and labetalol overnight for highpertension.  Serum creatinine down to 2.12, BUN 69.8, electrolytes are normal, patient made 1.6 L of urine output in the last 24 hours.  Hemoglobin stable, no leukocytosis.    Objective:  Temp:  [97.6  F (36.4  C)-98.8  F (37.1  C)] 97.6  F (36.4  C)  Pulse:  [] 94  Resp:  [11-19] 11  BP: (143-169)/() 143/95  SpO2:  [98 %-100 %] 99 %    I/O last 3 completed shifts:  In: 1215.35 [I.V.:345.35; NG/GT:390]  Out: 1350 [Urine:1350]      Gen: resting comfortably in bed, answering questions appropriately, no delirium/confusion, whispering words, not in acute distress  CV: tachycardic per tele with PVCs  Resp: non-labored, on room air   Abd: Abdominal incision c/d/I. No active bleeding, abdomen soft, non-tender to light palpation.  Ext: RLE wwp, palpable DP pulse, LLE stump incision with continued improved surrounding erythema similar to erythema of entire body, no obvious bleeding, soft, incisional prevena holding seal.    Prevena LLE placed on 11/06/23 - to be replaced in 5-7 days  Skin: Entire body with blanching erythema covering trunk arms, and legs significantly improved.     Labs:  Recent Labs   Lab 11/06/23 0453 11/05/23  0529 11/04/23  0416   WBC 7.8 8.0 7.1   HGB 7.8* 8.5* 7.8*    174 173       Recent Labs   Lab 11/06/23 0453 11/05/23  0529 11/04/23  0416    138 141   POTASSIUM 4.8 4.6 4.8   CHLORIDE 105 105 105   CO2 22 22 23   BUN 69.8* 75.4* 84.4*   CR 2.12* 2.14* 2.24*   * 139* 112*   OMAR 9.2 9.3 9.5   MAG 2.0 2.0 2.1   PHOS 5.2* 4.3 4.1      Assessment/Plan:   Alfredo Burnham is a 68 year old male with PMH of HTN and previous TIA who presented with R sided abdominal pain found to have contained ruptured AAA, now s/p open AAA repair 9/24 into 9/25am with 30 min supraceliac clamp time, prolonged inter-renal clamp time, temporary abdominal closure. He  did have bloody stool postop with flex sig 9/25 demonstrating ischemic mucosal changes of the rectum, repeated abdominal without evidence of transmural necrosis of the small or large intestine, or the rectum. On 9/29 he was started on CRRT and subsequently iHD. S/p closure of abdominal fascia and subcutaneous tissue wound VAC applied 10/2/23. 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 On 10/20/23 underwent abdominal incision closure.     10/24/23 found to have hgb 5.8, CTA demonstrated left psoas muscle hematoma now size 6 cm x 3 cm x 3 cm, barely seen on CTA from 10/13. Lesser sac of the peritoneal cavity collection stable, hematoma in left iliac stable, no active extravasation. Hep gtt held.     Overnight 10/24-10/25 patient more lethargic with fevers at 102.1 thought to be strokelike symptoms, stroke code called, workup negative. Hemoglobin dropped again to 5.8, despite holding heparin for 24 hours. Repeat CTA: demonstrated hematoma in LLE stump, with otherwise stable left iliac hematoma and stable left psoas muscle hematoma. Now resoled: patient's hgb stable, he is progressing well with continued improvements. Off iHD with renal recovery, tunneled line removed and cleared by SLP for diet. Pending calorie count by nutritionist and determining if NJ tube is still required for additional nutrition.    Neuro:   - Seroquel at bedtime and as needed for anxiety/delirium episodes  - Previously had stroke code called for unequal pupils, c/f facial droop 10/6: workup revealed: Numerous, punctate late hyperacute to acute infarctions thought to be embolic in nature. No embolic source identified in workup.  - Follow-up with neurology 4-6 weeks after discharge, ok with a/c.     HEENT:   - ENT consult for ongoing dysphonia/dysphagia: endoscopy with L vocal cord paralysis. S/p bedside vocal fold injection 11/1/23 with otolaryngology.    CV:   - Amlodipine 10mg daily (at home on amlodipine 5mg and benazepril 20mg) due to SBP>160 requiring  Labetalol IV. No benzepril for now. Added Metoprolol, up to 50mg PO given ongoing tachycardia.   - Goal SBP <160, MAP >65  - Labetalol prn for SBP >160  - Continue statin  - PE+, venous duplex with nonocclusive to occlusive thrombus of the left GSV from the level of the knee to the groin and nonocclusive thrombus of the left distal femoral vein and popliteal veins, increased in extent compared to 9/30/2023.   - Heparin drip low intensity, with therapeutic anti-Xa, no plan to change to high intensity, appropriately anticoagulated. Will need to transition to PO AC prior to discharge.    Resp:   - On room air, CXR clear    GI:   - Concern for Pancreatitis with peripancreatic fluid collection on CT 10/13 with question of bleeding into the collection. GI signed off as collection not drainable. - Improved appearance on repeat CT 10/25. Monitoring.    - TF tolerating via NJT, getting nocturnal feeds  - Nutrition following, appreciate recommendations, on calorie count. Pending assessment on whether the patient's intake provides adequate calories and can be off TF with subsequent NJT removal.   - Cleared by SLP For IDDSI level 5 minced/moist diet with level 3 moderately thickened liquids (NO straws, must be sitting upright, alert).  - Change abd dressing daily  - Loose stools -- C. Diff testing and if negative, will add psyllium and/or imodium.    FEN:   - Electrolyte replacement prn     Renal:   - Cr downtrending 2.12  - Last iHD 10/27. Lasix challenge 10/28 with great response. Continues to make good amount of urine, > 1.7L in the last 24 hours.   - Tunneled line removed with IR 11/2/23, nephrology signed off    Heme:   - Hgb 7.8  - DVT as above, PE   - Heparin drip low intensity, with therapeutic anti-Xa. No plan to transition to high intensity, appropriately anticoagulated. Will need to transition to PO AC prior to discharge.    ID:   -No further fevers, no leukocytosis. Patient has allergy to cefepime (hives). Continue  to monitor for fevers, leukocytosis.  - monitor signs of pneumonia  - nystatin swish for oral candida    MSK:   - L AKA 10/17/23: prevena applied 10/30/23, to stay on for 5 to 7 days  - ACE wrap on L AKA at all times  - Orthotics provided stump protector and  11/1/23    Derm:   - Rash significantly improved, not cellulitis. Pharm reviewed meds, all low risk, derm consulted, ID has seen as well, all felt were likely drug induced presumably from cefepime  - Did receive 1 dose cefepime 10/25 - this may be cause of worsening. May take 1-2 weeks to clear up.  - Appreciate dermatology recs, likely morbilliform eruption vs DRESS (low risk)    - triamcinolone 0.1% changed back to BID scheduled     Misc:   - Pediatric/Small tubes for blood draws (spoke with lab, included this request in daily lab draws)  - Appreciate aggressive PT/OT ROM, patient severely deconditioned and requires extensive physical therapy.  - Abd binder on at all times.  - Please keep LLE AKA wrapped with ACE.  - IMC status.  - Anticipate will require ARU at discharge (SW engaged and will communicate with family on preferences of acute rehab. [facility] is already following patient peripherally for potential transfer when ready).    Discussed patient with Dr. Kong who will discuss with vascular surgery staff.    Miryam Carrasco, SACHIN  Vascular Surgery  Pager: [phone]  [email or web address]  Send message or 10 digit call back number Securely via [place] with the Vocera Web Console (learn more here)

## 2023-11-06 NOTE — PROGRESS NOTES
Care Management Follow Up    Length of Stay (days): 43    Expected Discharge Date: 11/07/2023     Concerns to be Addressed: discharge planning  Patient plan of care discussed at interdisciplinary rounds: No    Anticipated Discharge Disposition:  ARU     Anticipated Discharge Services:  rehabilitative services  Anticipated Discharge DME:  TBD    Patient/family educated on Medicare website which has current facility and service quality ratings:  Yes  Education Provided on the Discharge Plan:  Yes  Patient/Family in Agreement with the Plan:  Yes    Referrals Placed by CM/SW:    Private pay costs discussed: Not applicable    Additional Information:  SW following for discharge planning. PT currently recommending ARU. SW spoke with [facility] (Miryam Carrasco, SACHIN) who reported that pt is nearing medical readiness. Pt still on heparin gtt and will need to be off this for ARU placement. Latest OT notes are from 11/2 and will need updated notes prior to sending any referrals.     SW met with pt and pt spouse (Tabby) at bedside to discuss discharge planning. SW shared ARU recommendation and provided education on ARU and services. SW provided pt/spouse with Medicare Care Compare list of ARU facilities. SW requested that they review the list and make some choices for SW to send referrals to. At the end of conversation, pt needed to use bathroom. Bedside RN entered room to assist and SW exited the room for privacy. Pt also had some other visitors waiting outside the room to visit. SW to meet with pt and Tabby again tomorrow to ensure there are no questions and get pt ARU choices.     SW to continue to follow for discharge planning.     JR Garcia   [facility]   [address]  Ph: [phone]

## 2023-11-06 NOTE — PROGRESS NOTES
Calorie Count  Intake recorded for: 11/5  Total Kcals: 0 Total Protein: 0g  Kcals from Hospital Food: 0   Protein: 0g  Kcals from Outside Food (average):0 Protein: 0g  # Meals Ordered from Kitchen: 1 meal  # Meals Recorded: No food intake recorded  # Supplements Recorded: No food intake recorded

## 2023-11-07 ENCOUNTER — APPOINTMENT (OUTPATIENT)
Dept: OCCUPATIONAL THERAPY | Facility: CLINIC | Age: 68
DRG: 268 | End: 2023-11-07
Payer: COMMERCIAL

## 2023-11-07 ENCOUNTER — APPOINTMENT (OUTPATIENT)
Dept: PHYSICAL THERAPY | Facility: CLINIC | Age: 68
DRG: 268 | End: 2023-11-07
Payer: COMMERCIAL

## 2023-11-07 ENCOUNTER — APPOINTMENT (OUTPATIENT)
Dept: SPEECH THERAPY | Facility: CLINIC | Age: 68
DRG: 268 | End: 2023-11-07
Payer: COMMERCIAL

## 2023-11-07 LAB
ANION GAP SERPL CALCULATED.3IONS-SCNC: 12 MMOL/L (ref 7–15)
BUN SERPL-MCNC: 63.5 MG/DL (ref 8–23)
CALCIUM SERPL-MCNC: 9.3 MG/DL (ref 8.8–10.2)
CHLORIDE SERPL-SCNC: 106 MMOL/L (ref 98–107)
CREAT SERPL-MCNC: 2.05 MG/DL (ref 0.67–1.17)
CRP SERPL-MCNC: 22.9 MG/L
DEPRECATED HCO3 PLAS-SCNC: 20 MMOL/L (ref 22–29)
EGFRCR SERPLBLD CKD-EPI 2021: 34 ML/MIN/1.73M2
ERYTHROCYTE [DISTWIDTH] IN BLOOD BY AUTOMATED COUNT: 16.4 % (ref 10–15)
GLUCOSE SERPL-MCNC: 127 MG/DL (ref 70–99)
HCT VFR BLD AUTO: 24.3 % (ref 40–53)
HGB BLD-MCNC: 7.7 G/DL (ref 13.3–17.7)
MAGNESIUM SERPL-MCNC: 2.1 MG/DL (ref 1.7–2.3)
MCH RBC QN AUTO: 30.8 PG (ref 26.5–33)
MCHC RBC AUTO-ENTMCNC: 31.7 G/DL (ref 31.5–36.5)
MCV RBC AUTO: 97 FL (ref 78–100)
PHOSPHATE SERPL-MCNC: 5 MG/DL (ref 2.5–4.5)
PLATELET # BLD AUTO: 156 10E3/UL (ref 150–450)
POTASSIUM SERPL-SCNC: 4.8 MMOL/L (ref 3.4–5.3)
RBC # BLD AUTO: 2.5 10E6/UL (ref 4.4–5.9)
SODIUM SERPL-SCNC: 138 MMOL/L (ref 135–145)
UFH PPP CHRO-ACNC: 0.3 IU/ML
VIT D+METAB SERPL-MCNC: 18 NG/ML (ref 20–50)
WBC # BLD AUTO: 7.1 10E3/UL (ref 4–11)

## 2023-11-07 PROCEDURE — 97530 THERAPEUTIC ACTIVITIES: CPT | Mod: GP

## 2023-11-07 PROCEDURE — 250N000013 HC RX MED GY IP 250 OP 250 PS 637: Performed by: STUDENT IN AN ORGANIZED HEALTH CARE EDUCATION/TRAINING PROGRAM

## 2023-11-07 PROCEDURE — 83735 ASSAY OF MAGNESIUM: CPT

## 2023-11-07 PROCEDURE — 99024 POSTOP FOLLOW-UP VISIT: CPT | Performed by: NURSE PRACTITIONER

## 2023-11-07 PROCEDURE — 250N000013 HC RX MED GY IP 250 OP 250 PS 637: Performed by: SURGERY

## 2023-11-07 PROCEDURE — 86140 C-REACTIVE PROTEIN: CPT | Performed by: NURSE PRACTITIONER

## 2023-11-07 PROCEDURE — 82306 VITAMIN D 25 HYDROXY: CPT | Performed by: NURSE PRACTITIONER

## 2023-11-07 PROCEDURE — 99222 1ST HOSP IP/OBS MODERATE 55: CPT | Mod: 24 | Performed by: PHYSICAL MEDICINE & REHABILITATION

## 2023-11-07 PROCEDURE — 97110 THERAPEUTIC EXERCISES: CPT | Mod: GP

## 2023-11-07 PROCEDURE — 250N000011 HC RX IP 250 OP 636: Mod: JZ | Performed by: NURSE PRACTITIONER

## 2023-11-07 PROCEDURE — 84100 ASSAY OF PHOSPHORUS: CPT | Performed by: NURSE PRACTITIONER

## 2023-11-07 PROCEDURE — 250N000013 HC RX MED GY IP 250 OP 250 PS 637: Performed by: NURSE PRACTITIONER

## 2023-11-07 PROCEDURE — 250N000013 HC RX MED GY IP 250 OP 250 PS 637

## 2023-11-07 PROCEDURE — 92526 ORAL FUNCTION THERAPY: CPT | Mod: GN

## 2023-11-07 PROCEDURE — 120N000003 HC R&B IMCU UMMC

## 2023-11-07 PROCEDURE — 36415 COLL VENOUS BLD VENIPUNCTURE: CPT | Performed by: NURSE PRACTITIONER

## 2023-11-07 PROCEDURE — 97530 THERAPEUTIC ACTIVITIES: CPT | Mod: GO | Performed by: OCCUPATIONAL THERAPIST

## 2023-11-07 PROCEDURE — 97110 THERAPEUTIC EXERCISES: CPT | Mod: GO | Performed by: OCCUPATIONAL THERAPIST

## 2023-11-07 PROCEDURE — 85027 COMPLETE CBC AUTOMATED: CPT | Performed by: NURSE PRACTITIONER

## 2023-11-07 PROCEDURE — 85520 HEPARIN ASSAY: CPT | Performed by: SURGERY

## 2023-11-07 PROCEDURE — 80048 BASIC METABOLIC PNL TOTAL CA: CPT | Performed by: NURSE PRACTITIONER

## 2023-11-07 RX ADMIN — QUETIAPINE FUMARATE 50 MG: 50 TABLET ORAL at 20:46

## 2023-11-07 RX ADMIN — ACETAMINOPHEN 975 MG: 325 TABLET, FILM COATED ORAL at 22:26

## 2023-11-07 RX ADMIN — PANCRELIPASE 2 CAPSULE: 24000; 76000; 120000 CAPSULE, DELAYED RELEASE PELLETS ORAL at 18:19

## 2023-11-07 RX ADMIN — METOPROLOL TARTRATE 75 MG: 50 TABLET, FILM COATED ORAL at 08:09

## 2023-11-07 RX ADMIN — LOPERAMIDE HYDROCHLORIDE 4 MG: 2 CAPSULE ORAL at 09:28

## 2023-11-07 RX ADMIN — PSYLLIUM HUSK 1 PACKET: 3.4 POWDER ORAL at 16:14

## 2023-11-07 RX ADMIN — Medication 1 MG: at 18:20

## 2023-11-07 RX ADMIN — METOPROLOL TARTRATE 75 MG: 50 TABLET, FILM COATED ORAL at 20:46

## 2023-11-07 RX ADMIN — ACETAMINOPHEN 975 MG: 325 TABLET, FILM COATED ORAL at 04:00

## 2023-11-07 RX ADMIN — HYDROCORTISONE: 25 OINTMENT TOPICAL at 20:47

## 2023-11-07 RX ADMIN — AMLODIPINE BESYLATE 10 MG: 10 TABLET ORAL at 08:10

## 2023-11-07 RX ADMIN — ACETAMINOPHEN 975 MG: 325 TABLET, FILM COATED ORAL at 16:17

## 2023-11-07 RX ADMIN — HEPARIN SODIUM 1400 UNITS/HR: 10000 INJECTION, SOLUTION INTRAVENOUS at 15:45

## 2023-11-07 RX ADMIN — TRIAMCINOLONE ACETONIDE: 1 OINTMENT TOPICAL at 20:47

## 2023-11-07 RX ADMIN — Medication 1 PACKET: at 09:29

## 2023-11-07 RX ADMIN — QUETIAPINE FUMARATE 50 MG: 50 TABLET ORAL at 08:10

## 2023-11-07 RX ADMIN — HYDROCORTISONE: 25 OINTMENT TOPICAL at 08:11

## 2023-11-07 RX ADMIN — ATORVASTATIN CALCIUM 10 MG: 10 TABLET, FILM COATED ORAL at 20:46

## 2023-11-07 RX ADMIN — QUETIAPINE FUMARATE 100 MG: 100 TABLET ORAL at 22:26

## 2023-11-07 RX ADMIN — Medication 60 ML: at 08:12

## 2023-11-07 RX ADMIN — Medication 40 MG: at 08:10

## 2023-11-07 RX ADMIN — TRIAMCINOLONE ACETONIDE: 1 OINTMENT TOPICAL at 08:11

## 2023-11-07 ASSESSMENT — ACTIVITIES OF DAILY LIVING (ADL)
ADLS_ACUITY_SCORE: 46
ADLS_ACUITY_SCORE: 46
ADLS_ACUITY_SCORE: 48
ADLS_ACUITY_SCORE: 46
ADLS_ACUITY_SCORE: 48
ADLS_ACUITY_SCORE: 46
ADLS_ACUITY_SCORE: 46
ADLS_ACUITY_SCORE: 48
ADLS_ACUITY_SCORE: 48
ADLS_ACUITY_SCORE: 46

## 2023-11-07 NOTE — PROGRESS NOTES
Calorie Count  Intake recorded for: 11/6  Total Kcals: 128 Total Protein: 2g  Kcals from Hospital Food: 128   Protein: 2g  Kcals from Outside Food (average):0 Protein: 0g  # Meals Ordered from Kitchen: 1 meal  # Meals Recorded: 1 meal (25% pudding, 10% oatmeal)  # Supplements Recorded: 10% 1 Magic Cup

## 2023-11-07 NOTE — PLAN OF CARE
Neuro: A&Ox4.  Follow command  can be anxious at times.   Cardiac: SR. VSS. BP <160 SBP during     Respiratory: Sating >95% on RA.   GI/: Adequate urine output. BM X 1 loose stool   Diet/appetite: Tolerating soft sized + bite sized level 6 moderate thick level 3 . Cycle TF 1600-8a  @ goal Rate 55 ml/hr w/ 30 ml FWF. Med crush via ND Tube. Patient tolerated well.   Activity:  Lift A2. L AKA.   Pain: At acceptable level on current regimen.   Skin: No new deficits noted.  LDA's: wound vac 125 mm hg. 2 RT PIV hep infusing 1400 ml/hr.

## 2023-11-07 NOTE — PLAN OF CARE
Neuro: A&Ox2. Disoriented to situation and time. Forgetful.  Frequently   Cardiac: ST/SR, HR 90-100s. SBP 130s. PRN labetalol and hydralazine available for SBP >160  Respiratory: Sating >93% on RA. Congested cough  GI/: Adequate urine output via urinal.  Diet/appetite: Tolerating regular diet. Juvencio counts. Soft and minced diet, moderately thickened liquids. Cycled TF from 6p-8a @ 40mL/hr with 30mL FWF Q 4 hour via ND. Meds crushed and thru ND. Poor appetite.  Activity:  Lift assist. L AKA.   Pain: At acceptable level on current regimen.   Skin: No new deficits noted. Abdominal binder in place  LDA's: Wound vac to L AKA @ -125 with 0 output. ND. 2x PIV    Plan: Continue with POC. Notify primary team with changes.

## 2023-11-07 NOTE — PROGRESS NOTES
CLINICAL NUTRITION SERVICES - REASSESSMENT NOTE     Nutrition Prescription    RECOMMENDATIONS FOR MDs/PROVIDERS TO ORDER:  Monitor ability for pt to consume at least ~1250 Kcals and ~65 gm protein before considering removal of FT (65% est needs)     Consider planning for long-term enteral access if not progressing with PO diet/intake    Consider psych consult for support with potential depression symptoms (wife requests).     Consider  consult per wife request (pt declined earlier in admit but now may be open to this)    Malnutrition Status:    Severe malnutrition in the context of acute illness     Recommendations already ordered by Registered Dietitian (RD):  Modify cyclic TF regimen as below (change of formula, rate, and addition of Relizorb per pt showing signs of pancreatic insufficiency.     New regimen:   Vital 1.5 juvencio (or equivalent) at rate of 55 mL/hr x 16 hours/evening to provide:   880 mL formula, 1320 Kcals (+ 100 Kcal Expedite), 60 gm protein (+ 10 gm Expedite), 165 gm CHO, 5 gm fiber, and 672 mL free water daily.   Attach Relizorb cartridge with TFs.   - Order Relizorb; attach 2 Relizorb cartridges in tandem configuration to run with the nocturnal/cyclic TFs. Discard cartridges after TF infusion. Do not re-use cartridges.   RELIZORB CARTRIDGES  *Supplies: Obtain cartridges by calling [phone] and provide DVS [employer] #[id number]  - Please see patient care order for further detail.     Discontinue Banatrol modular; not effective    Begin Creon 24 Pancreatic Enzymes with PO intake. Will order Creon 24 - 2 capsules with each meal + 1 capsule with snacks or supplements. Provides ~700 units lipase/kg body weight (per actual wt 68.5 kg)    Continue Juvencio cts 11/8-11/10    Begin room service with assistance orders per wife request, as she is no longer able to consistently order meals for pt. Please encourage PO intake, small frequent meals, high protein foods with each meal/snack.     Modified supplement  order; Magic Cup once daily (flavor change) + supplements PRN if requested. (Prefers Boost from home, thicken per diet order) Reminded wife that liquids need to be thickened per SLP rec; provided w/ thickener packets and instructions.     Check Vit D levels per potential fat malabsorption if pancreatic insufficiency present. Checking CRP with Vit D status, given potential for falsely low Vit D in setting of inflammation (if CRP >20)     Future/Additional Recommendations:  Monitor nutrition-related findings and follow pt per protocol  - When stool consistency firms, recommend ordering fecal elastase levels to officially check for pancreatic insufficiency. Holding off on this at present, as watery stool sample will result in falsely diluted levels (less accurate).  - Monitor for improvement to skin (dry, flaky at present; possible symptom of EFA deficiency developing, which may make sense clinically if malabsorbing fat (starting PERT)     EVALUATION OF THE PROGRESS TOWARD GOALS   Diet: Level 6: Soft & Bite-Sized Dysphagia Diet, level 3 moderately thickened liquids   Supplement: Ensure, thickened per diet order, Magic Cup, and Vital Cuisine (will cancel Vital Cuisine as this is mildly thick consistency and pt now on moderately thickened liquids)     PO Intake: 0-25% intake documented to I/O since last assessment. Calorie counts ongoing. No intake documented to juvencio ct sheet 11/4; 11/5; 1 meal documented 11/6 but only consisted of 25% pudding, 10% oatmeal, 10% Magic Cup (128 Kcals, 2 gm protein).     Nutrition Support:   -Dosing weight: 72 kg (driest wt 9/24)   -EN access via NDT   -Recent Regimen(s):   10/31-11/3: TwoCal HN at 40 mL/hr (960 mL/day) + 1 pkt Banatrol TF + Expedite once daily   11/3 - present:Two Juvencio HN at rate of 40 mL/hr x 14 hours/evening (8845-8764) + 1 pkt Banatrol TF + Expedite WH supplement to provide: 560 mL formula, 1265 Kcals, 59 gm protein, 123 gm CHO, ~8 gm fiber, and 392 mL free water daily    --> Meets 70% low end Kcal needs, ~70% low end est protein needs    -Modulars: Banatrol TF, 1 pkt daily; Expedite wound healing supplement once daily    -FWF 30 mL Q4hrs (180 mL/day) + 392 mL from TF for total free water provision of 572 mL/day    EN Intake - Average 7-day TF intake: 1430 Kcals (20 Kcal/kg), and 67 g pro (0.9 g/kg). This is meeting 79% of low-end est Kcal needs, and 78% of low-end est protein needs.     NEW FINDINGS   General:  Met with pt and wife at bedside. Ongoing diarrhea; abdominal cramping with PO intake (see GI section below). Low appetite, little interest in food. Encouraging PO intake, small frequent meals, oral nutrition supplements. Pt prefers Boost provided by wife. Discussed need to thicken beverages to moderately thick consistency per SLP rec; provided with thickener packets and instructed on mixing. Provided additional handouts for pt current diet consistency order as wife wants to provide some foods from outside of the hospital. Will be modifying TF order to a semi-elemental blend today + start of Relizorb; will also be starting Creon with PO foods per pt displaying signs of pancreatic insufficiency. Monitor for improvement to GI symptoms and PO intake.     GI:  3-7 unmeasured BMs per day over past week, per I/O.   Essentially no change to stooling trends w/ Banatrol or reduction of TFs to cyclic.   Abdominal cramping with PO intake, yellow stool color. With recent pancreatitis, it is reasonable to trial pancreatic enzymes to see if this improves his cramping with meals. Will provide Relizorb cartridges with TFs. D/W primary team NP and patient's wife who agree with this plan.     Weights:  Down 3.3 kg since admit (4.6%) over past week; significant for 1-week time frame. Monitor.   Continue current dosing weight for est nutrition goals; new driest wt 68.5 kg does not significantly change estimated needs.    Labs:  BUN continues elevated but much improved since last assessment.  Downward trend is ongoing; currently is 63.5. Continue to monitor.   PO4 elevated slightly; monitor. K+ WNL but on upward trend. Monitor.   BG trends within acceptable range for acute care setting (acceptable BG range for hospital setting is 140-180 mg/dL per ADA for most patients)    Medications:  Banatrol TF, 1 pkt daily - Discontinue, little effect   Metamucil 1 pkt daily   Expedite once daily     Skin:  Last WOCN note 11/1 - midline abdominal incision - healing   R heel PI (resolved); R cheek (bruise)   S/p AKA     MALNUTRITION  % Intake: Decreased intake does not meet criteria -Avg intake meeting >75% est needs   % Weight Loss: > 2% in 1 week (severe)  Subcutaneous Fat Loss: Facial region:  mild, Upper arm:  moderate, and Thoracic/intercostal:  moderate  Muscle Loss: Facial & jaw region:  mild, Thoracic region (clavicle, acromium bone, deltoid, trapezius, pectoral):  moderate, Upper arm (bicep, tricep):  moderate, Upper leg (quadricep, hamstring):  moderate, and Posterior calf:  moderate   Fluid Accumulation/Edema: Does not meet criteria (trace)  Malnutrition Diagnosis: Severe malnutrition in the context of acute illness     *Note, very dry, flaky skin. Monitor. Pt states this is new (may be a sign of EFA deficiency developing), hopefully will improve w/ start of pancreatic enzyme therapy, as this would improve fat absorption.     Previous Goals   Total avg nutritional intake to meet a minimum of 25 kcal/kg and 1.2 g PRO/kg daily (per dosing wt 72 kg).   Evaluation: Not met (TFs reduced w/ start of PO diet since last assess)    Previous Nutrition Diagnosis  Inadequate oral intake related to dysphagia as evidenced by NPO status.     Evaluation: No longer applicable, nutrition diagnosis changed below    CURRENT NUTRITION DIAGNOSIS  Inadequate oral intake related to decreased appetite, dysphagia as evidenced by minimal PO intake, cyclic TFs providing majority of nutrition at present.        INTERVENTIONS  Implementation  Collaboration with other providers - Primary team NP, bedside RN, PharmD   Enteral Nutrition - Modify composition, rate, and schedule (Semi-elemental, 16-hour cycle, Relizorb)  Medical food supplement therapy - Modified order as per note   Modify composition of meals/snacks - RS with assist to begin  Nutrition education for recommended modifications - Reason/rationale for TF change, start of Enzymes, POC  Nutrition-related medication management - Creon ordered, Relizorb ordered  Vitamin/minerals: Check Vit D and CRP    Goals  Total avg nutritional intake to meet a minimum of 20 kcal/kg and 1 g PRO/kg daily (per dosing wt 72 kg).  Pt to consume at least ~1250 Kcals and ~65 gm protein before considering removal of FT (65% est needs)     Monitoring/Evaluation  Progress toward goals will be monitored and evaluated per protocol.    Donavan Cobb RDN, LD, CNSC  Available via phone or [employer] chat, and pager  6B work-room RD phone: [phone]   6B/Obs RD pager: [phone]         Weekend/Holiday RD pager [phone]     bilateral upper extremity Active ROM was WFL (within functional limits)/bilateral  lower extremity Active ROM was WFL (within functional limits)

## 2023-11-07 NOTE — PLAN OF CARE
Goal Outcome Evaluation:      Plan of Care Reviewed With: patient, spouse    Overall Patient Progress: no changeOverall Patient Progress: no change    Outcome Evaluation: Please see RD note 11/7 for most updated nutrition plan of care.

## 2023-11-07 NOTE — PROGRESS NOTES
Vascular Surgery Progress Note  11/07/2023       Subjective:  Intermittent confusion, resting comfortably in bed.  No further labetalol or hydralazine IV were required overnight for hypertension, started on Imodium for frequent BMs.  His Prevena was changed yesterday, to stay on for 7 days.  Continues to have improved renal function.  With poor appetite, encouraged patient and wife to understand that taste will likely change given recent critical illness, continue to eat as much as possible for nutrition purposes.    Objective:  Temp:  [98.1  F (36.7  C)-98.5  F (36.9  C)] 98.2  F (36.8  C)  Pulse:  [] 100  Resp:  [14-24] 19  BP: (136-164)/() 136/88  SpO2:  [94 %-99 %] 96 %    I/O last 3 completed shifts:  In: 1133.06 [I.V.:328.06; NG/GT:565]  Out: 1325 [Urine:1275; Drains:50]      Gen: resting comfortably in bed, answering questions appropriately, no delirium/confusion, whispering words, not in acute distress  CV: tachycardic per tele with PVCs  Resp: non-labored, on room air   Abd: Abdominal incision c/d/I. No active bleeding, abdomen soft, non-tender to light palpation.  Ext: RLE wwp, palpable DP pulse, LLE stump incision with continued improved surrounding erythema similar to erythema of entire body, no obvious bleeding, soft, incisional prevena holding seal.    Prevena LLE placed on 11/06/23 - to be replaced in 5-7 days  Skin: Entire body with blanching erythema covering trunk arms, and legs significantly improved.     Labs:  Recent Labs   Lab 11/07/23 0545 11/06/23 0453 11/05/23 0529   WBC 7.1 7.8 8.0   HGB 7.7* 7.8* 8.5*    158 174       Recent Labs   Lab 11/07/23 0545 11/06/23 0453 11/05/23 0529    138 138   POTASSIUM 4.8 4.8 4.6   CHLORIDE 106 105 105   CO2 20* 22 22   BUN 63.5* 69.8* 75.4*   CR 2.05* 2.12* 2.14*   * 134* 139*   OMAR 9.3 9.2 9.3   MAG 2.1 2.0 2.0   PHOS 5.0* 5.2* 4.3      Assessment/Plan:   Alfredo Burnham is a 68 year old male with PMH of HTN and  previous TIA who presented with R sided abdominal pain found to have contained ruptured AAA, now s/p open AAA repair 9/24 into 9/25am with 30 min supraceliac clamp time, prolonged inter-renal clamp time, temporary abdominal closure. He did have bloody stool postop with flex sig 9/25 demonstrating ischemic mucosal changes of the rectum, repeated abdominal without evidence of transmural necrosis of the small or large intestine, or the rectum. On 9/29 he was started on CRRT and subsequently iHD. S/p closure of abdominal fascia and subcutaneous tissue wound VAC applied 10/2/23. 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 On 10/20/23 underwent abdominal incision closure.     10/24/23 found to have hgb 5.8, CTA demonstrated left psoas muscle hematoma now size 6 cm x 3 cm x 3 cm, barely seen on CTA from 10/13. Lesser sac of the peritoneal cavity collection stable, hematoma in left iliac stable, no active extravasation. Hep gtt held.     Overnight 10/24-10/25 patient more lethargic with fevers at 102.1 thought to be strokelike symptoms, stroke code called, workup negative. Hemoglobin dropped again to 5.8, despite holding heparin for 24 hours. Repeat CTA: demonstrated hematoma in LLE stump, with otherwise stable left iliac hematoma and stable left psoas muscle hematoma. Now resoled: patient's hgb stable, he is progressing well with continued improvements. Off iHD with renal recovery, tunneled line removed and cleared by SLP for diet.     Neuro:   - Seroquel at bedtime and as needed for anxiety/delirium episodes  - Previously had stroke code called for unequal pupils, c/f facial droop 10/6: workup revealed: Numerous, punctate late hyperacute to acute infarctions thought to be embolic in nature. No embolic source identified in workup.  - Follow-up with neurology 4-6 weeks after discharge, ok with a/c.     HEENT:   - ENT consult for ongoing dysphonia/dysphagia: endoscopy with L vocal cord paralysis. S/p bedside vocal fold injection  11/1/23 with otolaryngology.    CV:   - Amlodipine 10mg daily (at home on amlodipine 5mg and benazepril 20mg) due to SBP>160 requiring Labetalol IV. No benzepril for now. Added Metoprolol, up to 75mg PO given ongoing tachycardia.   - Goal SBP <160, MAP >65  - Labetalol prn for SBP >160  - Continue statin  - PE+, venous duplex with nonocclusive to occlusive thrombus of the left GSV from the level of the knee to the groin and nonocclusive thrombus of the left distal femoral vein and popliteal veins, increased in extent compared to 9/30/2023.   - Heparin drip low intensity, with therapeutic anti-Xa, no plan to change to high intensity, appropriately anticoagulated. Will transition to DOAC prior to discharge, will remain on heparin drip until the day before transfer to rehab.    Resp:   - On room air, CXR clear    GI:   - Concern for Pancreatitis with peripancreatic fluid collection on CT 10/13 with question of bleeding into the collection. GI signed off as collection not drainable. - Improved appearance on repeat CT 10/25. Monitoring.    - TF tolerating via NJT, getting nocturnal feeds  - Nutrition following, appreciate recommendations, on calorie count.  - Cleared by SLP For IDDSI level 6 soft and bite sized diet with level 3 moderately thickened liquids (NO straws, must be sitting upright, alert).  - Change abd dressing daily  - Loose stools -- added psyllium daily and imodium 4x/day PRN.  C. difficile negative.    FEN:   - Electrolyte replacement prn     Renal:   - Cr downtrending 2.05  - Last iHD 10/27. Lasix challenge 10/28 with great response. Continues to make good amount of urine.   - Tunneled line removed with IR 11/2/23, nephrology signed off    Heme:   - Hgb 7.7  - DVT as above, PE   - Heparin drip low intensity, with therapeutic anti-Xa. No plan to transition to high intensity, appropriately anticoagulated. Will transition to DOAC the day prior to discharge.    ID:   -No further fevers, no leukocytosis.  Patient has allergy to cefepime (hives). Continue to monitor for fevers, leukocytosis.  - monitor signs of pneumonia  - nystatin swish for oral candida    MSK:   - L AKA 10/17/23: prevena applied 10/30/23, to stay on for 5 to 7 days  - ACE wrap on L AKA at all times  - Orthotics provided stump protector and  11/1/23    Derm:   - Rash significantly improved, not cellulitis. Pharm reviewed meds, all low risk, derm consulted, ID has seen as well, all felt were likely drug induced presumably from cefepime  - Did receive 1 dose cefepime 10/25 - this may be cause of worsening. May take 1-2 weeks to clear up.  - Appreciate dermatology recs, likely morbilliform eruption vs DRESS (low risk)    - triamcinolone 0.1% changed back to BID scheduled     Misc:   - Pediatric/Small tubes for blood draws (spoke with lab, included this request in daily lab draws)  - Appreciate aggressive PT/OT ROM, patient severely deconditioned and requires extensive physical therapy.  - Abd binder on at all times.  - Please keep LLE AKA wrapped with ACE.  - IMC status.  - Anticipate will require ARU at discharge (SW engaged) by the end of the week. ARU vs TCU; PMR to guide on which would patient benefit most of.         Discussed patient with Dr. Lowe, vascular surgery staff.    Miryam Carrasco CNP  Vascular Surgery  Pager: [phone]  [email or web address]  Send message or 10 digit call back number Securely via [employer] with the Vocera Web Console (learn more here)

## 2023-11-07 NOTE — CONSULTS
[facility]     PM&R CONSULT        Consulting Provider: Miryam Carrasco CNP  Reason for Consult: Assessment of rehabilitation   Location of Patient: 6227-1  Date of Encounter: 11/7/2023   Date of Admission: 9/24/2023      ASSESSMENT/PLAN:    Mr. Alfredo Burnham is a 68 year old male with a history of legal blindness (macular degeneration), hypertension, TIA who initially presented to the [place] ED on 9/24/2023 with severe worsening abdominal pain, found to have ruptured infrarenal renal AAA with associated LLE ischemia now s/p AKA (10/17/2023).  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    Physical Medicine and Rehabilitation have been consulted to evaluate patient for rehabilitation needs/discharge planning.    Recommendations:   The best place for the patient's disposition is a very complex scenario in the setting of significant deconditioning associated with multiple surgeries, prolonged hospital course, and multiple complications with an additional layer of complexity added from his pre-existing visual impairment and recent fatigue associated with new diarrhea.  As he has a new AKA and has needs from PT/OT and SLP, acute rehab is the ideal location for him, however in his current state I am not confident he would tolerate the 3 hours of therapy due to his fatigue. Based on his limitations (and his recognition of these) at this exact time point TCU is likely a better option.  Both he and his spouse feel that he was doing better prior to the diarrhea onset and this is supported on discussion with physical therapy team today.  If there is some resolution of his diarrhea, there could possibly be some  improved tolerance of therapies.  As there still seems to be some medical changes needed prior to discharge to a rehab setting, our team will be able to better assess his trajectory and anticipated tolerance following a subsequent visit     We will plan to reassess on Friday to offer better recommendation on his rehab trajectory. Please connect with the rehab consult team regarding additional questions.    Potential barriers to rehab admission:  -Recommend transition from IV to PO prn medications for BP management  -Recommend discontinuing heparin drip in factor of DOAC prior to discharge to rehab     Thank you for this interesting consult.     Patient was seen and discussed with staff attending, Dr. Kelley.    Sandip Khan MD   Physical Medicine & Rehabilitation PGY4   [facility]  -----------------------------------------------------------------------------------------------------------------------------------------    HPI:    Alfredo Burnham is a 68 year old  male with a history of TIA, hypertension, and legal blindness (secondary to macular degeneration) who initially presented to the [facility] ED on 9/24 with 1 day of progressively worsening, severe right-sided abdominal pain radiating to flank and low back with associated nausea.  On abdominal CTA, he was found to have a ruptured pararenal aortic aneurysm and underwent open repair of AAA on 9/24/2023 with the vascular surgery team.  Postoperative course was complicated by renal failure necessitating CCRT, which was was started on 9/29/2023, bowel ischemia s/p multiple washouts.  Also developed left lower extremity ischemia, thought secondary to suspected emboli. Abdominal fascia closed 10/2 with placement of wound vac to subcutaneous tissue. He was extubated on 10/4 and transition to IHD on 10/5.  Stroke code was called on 10/7 when patient was noted to have a new right-sided facial droop, anisocoria with NIHSS of 19 (attributed to  deconditioning following surgery/sedation/paralytics/prolonged extubation and facial droop).  CT head was negative for acute stroke or bleed, CTA without proximal large vessel acute lesion, but did reveal concern for PE.  Brain MRI with multifocal infarcts, consistent with embolic etiology but of unclear source.  He was subsequently reintubated on 10/13 when a code blue was called and he was found to have hypoxic respiratory failure.  On abdominal CT, there was some concern for pancreatitis with peripancreatic fluid collection on 10/13 with question of bleeding into the collection. GI signed off as they felt collection was not drainable. Abdominal closure on 10/20. Peripancreatic fluid collection noted to have improved appearance on repeat CT on 10/25.  Last IHD underwent on 10/27/2023.  Tunneled line was removed on 11/2/2023 and nephrology has signed off.  Underwent L LE AKA on 10/17/2023. Underwent L vocal fold injection laryngoplasty on 11/1 with ENT due to L VF paralysis.  Hospitalization further complicated by psoas hematoma (6cm x 3cm x 3cm 10/24, requiring intermittent transfusions) delirium (stroke code for AMS 10/25; workup negative), and drug rash.    Most recent transfusion 11/3.  Remains on heparin GGT with plan to transition to DOAC prior to discharge. Last received Labetalol IV prn on 11/6 x2 for SBP above parameters. Oxycodone 5mgx1 on 11/6. Seroquel TID scheduled (50/50/100).  Nutrition is consulted for assistance with poor appetite and remains on calorie count. Developed significant diarrhea over the past several days, Cdiff negative 11/6. Prevena place on LLE 11/6    Met with Jorge briefly prior to his PT session this morning. He continues to be intermittently confused, worse overnight and does not recall calling Tabby 3x this morning. Both note that the past few days have been hard as he has been significantly more fatigued since the diarrhea started and has been less tolerant of  therapies. His voice has remained hoarse and quiet following the VF injection. Overall feels bored, but has found it helpful to meet with  in the past and is open to meeting with additional MH resources regarding his complex hospitalization. Overall feels pain is well controlled at this time but acknowledges there is still ongoing medical care.     Following discussion of TCU vs ARU and the associated difference, both Nelson and Tabby have some hesitations as to if at this exact point in time he would be able to tolerate the intensity of the ARU. Both report he was doing better prior to the diarrhea and wonder if he would be a candidate if this were to improve. Session abbreviated to allow patient to work with PT.     NURSING REPORT:  A&Ox2, frequently forgetful and disoriented overnight. Vac in place on AKA. Cycled TF 6pm-8am. Transfers with lift. Uses urinal. Abdominal binder. Loose stools    CURRENT PRECAUTIONS/RESTRICTIONS:  Abdominal, cardiac, sternal precautions    DVT PPX:  Currently on heparin ggt    CURRENT FUNCTION:   PT: CGA-min a for VC and additional time for set up in line management.  STS with mod Ax2 for 2 reps and assist at IDs for power to stand.  Tolerates ~10-30 seconds each trial before refusing further attempts.  Overhead lift with Ax2.  Recommending ARU 11/6    OT: A x2 STS from EOB with therapy.  Overhead left with nursing.  Overall limited by activity tolerance.  Recommending TCU 11/6    SLP: Mild-moderate oropharyngeal dysphagia with modified diet, currently on minced and moist (IDDSI 5) and moderately thickened liquids (IDDSI 3) with 1:1 supervision.  Anticipate VF SS in 1-2 weeks pending progress.  Recommending TCU      LIVING SITUATION/SUPPORT:  Patient lives with spouse Tabby in a [address] apartment building.     Support system includes spouse    Work status: Retired    SOCIAL HISTORY:  Pt was independent with all ADLs/iADLs, transfers, mobility and gait.  Able to walk approximately  30 minutes  - Activities of daily living:  Previously independent  - Cognition:    Previously independent  - Mobility:    Previously independent  - Assistive devices:  None    PAST MEDICAL HISTORY:  Past Medical History:   Diagnosis Date     Hypertension 2/8/2011     Macular degeneration          CURRENT MEDICATIONS:  Current Facility-Administered Medications   Medication     acetaminophen (TYLENOL) tablet 650 mg     acetaminophen (TYLENOL) tablet 975 mg     amLODIPine (NORVASC) tablet 10 mg     atorvastatin (LIPITOR) tablet 10 mg     banatrol plus packet 1 packet     benzocaine 20% (HURRICAINE/TOPEX) 20 % spray 0.5-1 mL     bisacodyl (DULCOLAX) suppository 10 mg     cetirizine (zyrTEC) tablet 5 mg     dextrose 10% infusion     dextrose 10% infusion     glucose gel 15-30 g    Or     dextrose 50 % injection 25-50 mL    Or     glucagon injection 1 mg     diphenhydrAMINE-zinc acetate (BENADRYL) 1-0.1 % cream     heparin infusion 25,000 units in D5W 250 mL ANTICOAGULANT     hydrALAZINE (APRESOLINE) injection 10-20 mg     hydrocortisone 2.5 % ointment     HYDROmorphone (PF) (DILAUDID) injection 0.3 mg     hydrOXYzine (ATARAX) tablet 25 mg    Or     hydrOXYzine (ATARAX) tablet 50 mg     labetalol (NORMODYNE/TRANDATE) injection 10-20 mg     lidocaine (XYLOCAINE) 2 % external gel     lidocaine (XYLOCAINE) 2 % external gel     lidocaine (XYLOCAINE) 4 % solution 5 mL     lidocaine 1% with EPINEPHrine 1:100,000 injection 5 mL     loperamide (IMODIUM) capsule 4 mg     melatonin tablet 1 mg     metoprolol tartrate (LOPRESSOR) tablet 75 mg     naloxone (NARCAN) injection 0.2 mg    Or     naloxone (NARCAN) injection 0.4 mg    Or     naloxone (NARCAN) injection 0.2 mg    Or     naloxone (NARCAN) injection 0.4 mg     ondansetron (ZOFRAN ODT) ODT tab 4 mg    Or     ondansetron (ZOFRAN) injection 4 mg     oxyCODONE (ROXICODONE) tablet 5-10 mg     pantoprazole (PROTONIX) 2 mg/mL suspension 40 mg     prochlorperazine (COMPAZINE)  "injection 5 mg    Or     prochlorperazine (COMPAZINE) tablet 5 mg     QUEtiapine (SEROquel) tablet 100 mg     QUEtiapine (SEROquel) tablet 50 mg     sodium chloride (PF) 0.9% PF flush 10 mL     triamcinolone (KENALOG) 0.1 % ointment     wound support modular (EXPEDITE) bottle 60 mL       EXAMINATION:  Vital signs:  Temp: 98.3  F (36.8  C) Temp src: Oral BP: 136/88 Pulse: 100   Resp: 19 SpO2: 96 % O2 Device: None (Room air) Oxygen Delivery: 4 LPM Height: 172.7 cm (5' 8\") Weight: 68.5 kg (151 lb 0.2 oz)  Estimated body mass index is 22.96 kg/m  as calculated from the following:    Height as of this encounter: 1.727 m (5' 8\").    Weight as of this encounter: 68.5 kg (151 lb 0.2 oz).    General: NAD, pleasant and cooperative   HEENT: ATNC, no discharge from nares or ears. NG tube in place  Pulmonary: breathing comfortably on room air, no accessory muscle use   Cardiovascular: RRR, radial pulses 2+ b/l  Abdominal: soft, non-tender to palpation. Abdominal binder in place with bandage over midline incision.   Extremities: Warm and well perfused in bilateral upper and lower extremities. L AKA bulbous in shape with ACE in place and Prevena at suction with serosanguinous fluid in canister  Skin: Desquamation on hands, diffuse blanching erythema throughout  MSK/neuro:   Mental Status:  alert and oriented to person place and day (\"its election day\"). Follows 1-2 step commands   Cranial Nerves: Grossly intact, aside from baseline central field VI.  Pupils round, reactive to light. Slight anisocoria, L > R   Sensory: Grossly normal to light touch in bilateral upper and lower extremities    Strength: MMT deferred due to PT session. Able to assist with bed mobility using upper extremities. Moves limbs with at least greater than antigravity strength   Reflexes: not assessed   Tone per modified Rula Scale: none   Abnormal movements: None    Speech: Fluent with intact comprehension and repetition. Voice is soft, " hoarse   Cognition: intact to superficial conversation with recall of historic events but not of some recent   Gait: deferred      LABS AND IMAGING:    Recent Labs   Lab Test 11/07/23  0545 10/30/23  1216 10/30/23  0651 10/25/23  1144 10/25/23  1112      < > 135   < > 133*   POTASSIUM 4.8   < > 3.4   < > 5.1   CHLORIDE 106   < > 98   < > 96*   CO2 20*   < > 23   < > 25   BUN 63.5*   < > 90.8*   < > 88.6*   CR 2.05*   < > 3.53*   < > 4.59*   *   < > 137*   < > 149*   GFRESTIMATED 34*   < > 18*   < > 13*   AST  --   --  24   < >  --    ALT  --   --  15   < >  --    ALKPHOS  --   --  132*   < >  --    BILITOTAL  --   --  0.5   < >  --    BHUPENDRA  --   --   --   --  18    < > = values in this interval not displayed.     Recent Labs   Lab Test 11/07/23  0545   WBC 7.1   RBC 2.50*   HGB 7.7*   MCV 97   MCH 30.8   MCHC 31.7   RDW 16.4*        Recent Labs   Lab Test 10/25/23  1040   INR 1.24*       Other labs:    XR Video Swallow with SLP or OT    Result Date: 11/3/2023  Examination: Modified Barium Swallow Study with Speech Pathology, 11/2/2023 1:58 PM. Comparison: 10/23/2023. History: Penetration and aspiration on prior swallow study. Reassess. Fluoroscopy time: 2.7 minutes. Technique: Under fluoroscopic guidance, the patient was given orally administered barium of varying consistencies in the presence of the speech pathology service. Findings: The oral phase was disorganized. There is no delay in swallowing or pooling of contrast. With thin and mildly thick liquid consistencies, there was deep laryngeal penetration/ tracheal aspiration. No laryngeal penetration or tracheal aspiration with moderately thick liquid, pudding, and barium coated cracker consistencies. Small amount of residue in the valleculae with pudding and barium coated cracker consistencies.      Impression: Deep laryngeal penetration/aspiration with thin liquid barium; deep laryngeal penetration with mildly thick barium consistency.  Please see the speech pathologist report for further details regarding the modified barium swallow study portion of the examination. I, LILLIAN HAYNES MD, attest that I was immediately available to provide guidance and assistance during the entirety of the procedure. I have personally reviewed the examination and initial interpretation and I agree with the findings. LILLIAN HAYNES MD   SYSTEM ID:  [id number]    XR Chest Port 1 View    Result Date: 11/3/2023  Examination:  XR CHEST PORT 1 VIEW Date:  11/3/2023 7:45 AM Clinical Information: [place] line from right IJ removed yesterday, now with ongoing hgb drop, assess for chest collections. Additional Information: none Comparison: CTA 10/25/2023, chest x-ray 10/16/2023 Findings: Portable AP view the chest. Feeding tube tip extends off the field of view but appears to pass through the stomach. Interval removal of right IJ central venous catheter, NG tube, left subclavian central venous catheter and ET tube. Normal heart size. No right pleural effusion. Left costophrenic angle is not completely visualized. No pneumothorax. Pulmonary vascular engorgement. Dense retrocardiac opacity. No suspicious osseous lesions.     Impression: 1. Interval removal of right IJ CVC, NG tube, left subclavian CVC, and ET tube. 2. No new fluid collections. 3. Left lower lobe atelectasis versus consolidation. I have personally reviewed the examination and initial interpretation and I agree with the findings. HERNANDO HERNANDEZ MD   SYSTEM ID:  [id number]    IR CVC Tunnel Removal Right    Result Date: 11/2/2023  PROCEDURE: Tunneled central venous catheter removal Procedural Personnel Advanced practice provider(s): Angus Valladares PA-C Pre-procedure diagnosis: AAA Post-procedure diagnosis: Same Indication: Catheter no longer needed Additional clinical history: AAA repair, MAYA, tunneled dialysis catheter placed 10/18, no longer needs dialysis with renal recovery Complications: No immediate  complications.     IMPRESSION: Removal of right-sided tunneled dialysis catheter. Plan: Please re-consult interventional radiology if new catheter placement is desired. _______________________________________________________________ PROCEDURE SUMMARY: - Tunneled central venous catheter removal - Additional procedure(s): None PROCEDURE DETAILS: Pre-procedure Consent: Informed consent for the procedure including risks, benefits and alternatives was obtained and time-out was performed prior to the procedure. Preparation: The site was prepared and draped using maximal sterile barrier technique including cutaneous antisepsis. Anesthesia/sedation Level of anesthesia/sedation: No sedation Anesthesia/sedation administered by: Not applicable Total intra-service sedation time (minutes): N/a Catheter removal Local anesthesia was not administered. The catheter was removed with traction. Fluoroscopic images were not obtained to document removal. Closure Hemostasis was achieved with manual compression. Sterile dressing(s) applied. Additional Details Additional description of procedure: None Device used: None Equipment details: None Specimens removed: Tunneled central venous catheter. Estimated blood loss (mL): Less than 10 Standardized report: [email or web address] Attestation Signer name: VENU VIERA PA-C I attest that I was present for the entire procedure. I reviewed the stored images and agree with the report as written. VENU VIERA PA-C   SYSTEM ID:  [id number]    CTA Chest Abdomen Pelvis w Contrast    Result Date: 10/26/2023  Exam: Computed tomographic angiography of the chest, abdomen, pelvis, and bilateral lower extremities with contrast dated 10/25/2023 Clinical information: Triple phase CTA - non-con, arterial, and venous phases, would like down to the knees please if able. Technique: Axial images obtained of the abdomen, pelvis, and lower extremities through the feet obtained following the injection of  contrast media in the arterial phase. Source images reviewed as well as 3D and multi-planar reconstructions. Contrast: 90 mL Isovue-370 DLP: 1490 mGy*cm Comparison: 10/24/2023. Findings:  Aorta: Normal size of the thoracic aorta. Mild aneurysmal dilation of the common trunk of the right brachiocephalic and left common carotid arteries. Mild noncalcified plaque at origin of the right brachiocephalic artery, similar to prior. The origins of the left common carotid and left subclavian arteries are unremarkable. Unchanged postsurgical changes open repair of ruptured AAA. The aneurysmal sac measures up to 4.5 cm, similar to prior. No definitive contrast enhancement in the excluded aneurysmal sac. Large hematoma predominantly in the anterior pararenal space, large intramuscular hematoma in the left iliopsoas muscle are similar to prior. Large hyperdense fluid collection near the stump of recent above-the-knee amputation with scattered air foci measuring 15 x 13 x 8 cm. Enhancement is seen immediately adjacent the hematoma, however, no active contrast extravasation is seen. The proximal pulmonary vasculature is unremarkable. No central pulmonary embolism. The celiac trunk and the superior mesenteric artery are patent. Early origin of the left hepatic artery.  The inferior mesenteric artery origin is occluded with reconstituted flow distally. Single right renal artery is patent. Three left renal arteries. Similar appearance of severe stenosis vs occlusion at the origin of the most inferior left renal artery.  If occluded, distal flow is provided by collateral flow from the other renal arteries. Aneurysmal dilation of the right greater than left common iliac and common femoral arteries are similar to prior. Bilateral corona mortis. Right pelvis and lower extremity: Common iliac artery: Patent External iliac artery: Less than 50% stenosis Common femoral artery: Patent Profunda femoral artery: Patent Superficial femoral  artery: Less than 50% stenosis Popliteal artery: 50% stenosis behind the knee Tibioperoneal trunk: Patent Left pelvis and lower extremity: Common iliac artery: 65% stenosis near the origin External iliac artery: Less than 50% stenosis Common femoral artery: 50% stenosis near the origin Profunda femoral artery: 50% stenosis near its origin with mild poststenotic dilation Superficial femoral artery: Patent Chest and abdomen: Similar size of left greater than right pleural effusions with compressive atelectasis. The density of the pleural effusion has increased compared to the scan from yesterday. No active contrast extravasation.  Mild subpleural opacities are similar to prior, likely atelectasis. No hilar, mediastinal or axillary lymphadenopathy. Similar appearance of wedge-shaped renal infarcts and a right renal cyst. Hepatic cirrhosis. Similar size of hypoenhancing lesions in the right lobe of the liver. Increased layering hyperdensity in the gallbladder from vicarious contrast excretion. Increased density of moderate volume ascites. Enteric tubes terminate within the stomach and within the duodenum. Embolization material obscures the proximal body of the stomach and the greater curvature as well as portions of the spleen from beam hardening artifact. The visualized spleen, pancreas, adrenal glands appear normal. The stomach, small and colon appear normal. The appendix is normal.  Spine findings compatible with DISH.     Impression: 1. Postsurgical changes of open repair of ruptured AAA. 2. Unchanged size of large retroperitoneal and left iliopsoas muscle hematoma. No evidence of active extravasation. 3. Unchanged size of bilateral pleural effusions and small ascites with increased density compared to last scan. No evidence of active extravasation in the pleural space or peritoneal cavity. 4. The left lower extremity above-the-knee amputation stump had not been previously imaged.  Large hematoma with enhancement  immediately adjacent the hematoma, without active contrast extravasation visualized. [Urgent Result: Large above-the-knee amputation stump hematoma, not previously imaged] Finding was identified on 10/25/2023 11:15 AM. Dr. PEDROZA was contacted by Dr. Winston at 10/25/2023 11:35 AM and verbalized understanding of the urgent finding. I have personally reviewed the examination and initial interpretation and I agree with the findings. YURIY PARK MD   SYSTEM ID:  [id number]    CT Head w/o Contrast    Result Date: 10/25/2023  EXAM: CT HEAD WITHOUT CONTRAST, CTA HEAD NECK WITH CONTRAST LOCATION: [facility] DATE: 10/25/2023 INDICATION: AMS: new onset lethargy. COMPARISON: Brain MRI from 10/07/2023 CONTRAST: 67ml isovue 370 TECHNIQUE: Head and neck CT angiogram with IV contrast. Noncontrast head CT followed by axial helical CT images of the head and neck vessels obtained during the arterial phase of intravenous contrast administration. Axial 2D reconstructed images and multiplanar 3D MIP reconstructed images of the head and neck vessels were performed by the technologist. Dose reduction techniques were used. All stenosis measurements made according to NASCET criteria unless otherwise specified. FINDINGS: NONCONTRAST HEAD CT: INTRACRANIAL CONTENTS: No intracranial hemorrhage, extra-axial collection, or mass effect.  No CT evidence of acute infarct. Mild volume loss and presumed chronic small vessel ischemia are stable. Multiple scattered infarcts seen in the cerebellum, thalami and periventricular white matter are much better appreciated on prior MRI. No definite new infarct by CT. VISUALIZED ORBITS/SINUSES/MASTOIDS: No intraorbital abnormality. Moderate mucosal thickening of the left maxillary sinus. Mild scattered paranasal sinus mucosal disease elsewhere. No middle ear or mastoid effusion. BONES/SOFT TISSUES: No acute abnormality. HEAD CTA: Mild intracranial atheromatous  disease. ANTERIOR CIRCULATION: No stenosis/occlusion, aneurysm, or high-flow vascular malformation. Standard [place] anatomy. POSTERIOR CIRCULATION: No stenosis/occlusion, aneurysm, or high-flow vascular malformation. Balanced vertebral arteries supply a normal basilar artery. DURAL VENOUS SINUSES: Expected enhancement of the major dural venous sinuses. NECK CTA: Scattered atheromatous disease. RIGHT CAROTID: No measurable stenosis or dissection. LEFT CAROTID: No measurable stenosis or dissection. VERTEBRAL ARTERIES: No focal stenosis or dissection. Balanced vertebral arteries. AORTIC ARCH: Classic aortic arch anatomy with no significant stenosis at the origin of the great vessels. NONVASCULAR STRUCTURES: Unremarkable.     IMPRESSION: HEAD CT: 1.  No acute intracranial abnormality. 2.  No hemorrhage or mass effect 3.  Scattered infarcts seen on the previous MRI are much better appreciated on that study. No definite new infarct by CT. HEAD CTA: 1.  No high-grade stenosis, branch occlusion, or aneurysm. NECK CTA: 1.  No high-grade stenosis or dissection. Findings were discussed with Dr. Donald at 06:00 hours, 10/25/2023.     CTA Head Neck with Contrast    Result Date: 10/25/2023  EXAM: CT HEAD WITHOUT CONTRAST, CTA HEAD NECK WITH CONTRAST LOCATION: [facility] DATE: 10/25/2023 INDICATION: AMS: new onset lethargy. COMPARISON: Brain MRI from 10/07/2023 CONTRAST: 67ml isovue 370 TECHNIQUE: Head and neck CT angiogram with IV contrast. Noncontrast head CT followed by axial helical CT images of the head and neck vessels obtained during the arterial phase of intravenous contrast administration. Axial 2D reconstructed images and multiplanar 3D MIP reconstructed images of the head and neck vessels were performed by the technologist. Dose reduction techniques were used. All stenosis measurements made according to NASCET criteria unless otherwise specified. FINDINGS:  NONCONTRAST HEAD CT: INTRACRANIAL CONTENTS: No intracranial hemorrhage, extra-axial collection, or mass effect.  No CT evidence of acute infarct. Mild volume loss and presumed chronic small vessel ischemia are stable. Multiple scattered infarcts seen in the cerebellum, thalami and periventricular white matter are much better appreciated on prior MRI. No definite new infarct by CT. VISUALIZED ORBITS/SINUSES/MASTOIDS: No intraorbital abnormality. Moderate mucosal thickening of the left maxillary sinus. Mild scattered paranasal sinus mucosal disease elsewhere. No middle ear or mastoid effusion. BONES/SOFT TISSUES: No acute abnormality. HEAD CTA: Mild intracranial atheromatous disease. ANTERIOR CIRCULATION: No stenosis/occlusion, aneurysm, or high-flow vascular malformation. Standard [place] anatomy. POSTERIOR CIRCULATION: No stenosis/occlusion, aneurysm, or high-flow vascular malformation. Balanced vertebral arteries supply a normal basilar artery. DURAL VENOUS SINUSES: Expected enhancement of the major dural venous sinuses. NECK CTA: Scattered atheromatous disease. RIGHT CAROTID: No measurable stenosis or dissection. LEFT CAROTID: No measurable stenosis or dissection. VERTEBRAL ARTERIES: No focal stenosis or dissection. Balanced vertebral arteries. AORTIC ARCH: Classic aortic arch anatomy with no significant stenosis at the origin of the great vessels. NONVASCULAR STRUCTURES: Unremarkable.     IMPRESSION: HEAD CT: 1.  No acute intracranial abnormality. 2.  No hemorrhage or mass effect 3.  Scattered infarcts seen on the previous MRI are much better appreciated on that study. No definite new infarct by CT. HEAD CTA: 1.  No high-grade stenosis, branch occlusion, or aneurysm. NECK CTA: 1.  No high-grade stenosis or dissection. Findings were discussed with Dr. Donald at 06:00 hours, 10/25/2023.     CTA CHEST ABDOMEN PELVIS WITH CONTRAST PANEL    Result Date: 10/24/2023  CTA CHEST, ABDOMEN, AND PELVIS WITH 3D AND  MULTIPLANAR RECONSTRUCTIONS 10/24/2023 9:11 AM CLINICAL HISTORY: Hgb drop to 5.8 from 7.4, postop aaa open repair patient, unclear source of bleeding. Resection of ruptured abdominal aneurysm with aortobiiliac bifurcated graft placement 2023. Abdominal washout 10/2/2023. Abdominal closure 10/20/2023. COMPARISONS: CT chest, abdomen, and pelvis 10/13/2023 REFERRING PROVIDER: [place] YOON TECHNIQUE: Unenhanced CT performed through the chest, abdomen, and pelvis. After the uneventful administration of intravenous contrast, CTA performed through the chest, abdomen, and pelvis. After a 3 minute delay, CT repeated through the abdomen. Coronal and sagittal reconstructions were produced. Lung MIP produced. 3D and multiplanar reconstructions were produced and reviewed. CONTRAST: 90 mL Isovue 370 DOSE (DLP): 1245 mGy*cm FINDINGS: CTA: Postsurgical changes of open repair of ruptured AAA. Stable size of aneurysm sac extending just above the origin of the most inferior left renal artery to the level of bifurcation and measuring up to 4.5 cm, previously 4.6 cm when measured in similar fashion. No evidence of contrast opacification of the excluded aneurysmal sac. New mixed density fluid collection with fluid-fluid levels in the left psoas muscle measuring 6 x 3 x 3 cm. Large intraperitoneal hematoma predominantly in the lesser sac, intramuscular hematoma in the left illiacus muscle are similar to prior. Aortic measurements:      Sinuses of Valsalva: 37 mm      Sinotubular junction: 29 mm      Ascendin mm      Arch: 33 mm      Proximal descendin mm      Mid descendin mm      Diaphragm: 27 mm Similar appearance of mild aneurysmal dilation of the common trunk of the right brachiocephalic and left common carotid arteries. There is mild noncalcified plaque at the origin of the right brachiocephalic artery. Origins of left common carotid artery and left subclavian artery show no focal abnormality. The proximal pulmonary  vasculature appears normal. No central PE. Celiac and superior mesenteric artery patent. Inferior mesenteric artery origin occluded. Distal branches reconstituted by collaterals. Early takeoff of the left hepatic artery. Single right renal artery is patent. 3 left renal arteries. Severe stenosis at the origin of the most inferior left renal artery. Aneurysmal dilation of the right greater than left common iliac and common femoral arteries are similar to prior. Chest and abdomen: Similar size of left greater than right bilateral pleural effusions with compression atelectasis. Scattered mild subpleural opacities are most likely atelectasis. No hilar, mediastinal or axillary lymphadenopathy is seen. Similar appearance of wedge-shaped areas of hypoenhancement in the kidneys, most likely infarcts. Similar size of right renal cyst. Similar size of hypoenhancing lesion in the right lobe of the liver. Increased layering hyperdensity in the gallbladder, most likely vicarious excretion of the contrast. The spleen, adrenal glands, pancreas, kidneys show no focal abnormalities. Normal-sized large and small bowel. Similar appearance of small ascites. Residual contrast seen in small and large bowels. Stable grade 1 anterolisthesis of L5 over S1 with left-sided pars defect.     IMPRESSION: 1. New intramuscular mixed density fluid collection in the left psoas muscle, most likely a hematoma. No active extravasation. 2. Postsurgical changes of open repair of ruptured AAA. Stable size of aneurysm sac with no definitive evidence of contrast enhancement in the excluded aneurysm sac. Similar size of large intraperitoneal and left iliacus muscle hematomas. 3. Stable size of left greater than right bilateral pleural effusions with compressive atelectasis. [Urgent Result: New intramuscular hematoma of the left psoas muscle] Finding was identified on 10/24/2023 10:15 AM. KY YOON was contacted by Dr. Winston at 10/24/2023 10:23 AM and  verbalized understanding of the urgent finding. I have personally reviewed the examination and initial interpretation and I agree with the findings. MARY ONEIL MD   SYSTEM ID:  [id number]    XR Abdomen Port 1 View    Result Date: 10/24/2023  Exam: XR ABDOMEN PORT 1 VIEW, 10/24/2023 12:16 PM Indication: new NJ placed Comparison: 10/24/2023 Findings: Feeding tube tip projects over the distal duodenum. Positive enteric contrast material is coursing within the large bowel. Orogastric tube tip and sidehole projected over the stomach. No objective bowel gas pattern. No chronic stool burden. Post surgical changes of ruptured AAA repair and large vertical skin staple line and percutaneous drains. Partially visualized lung bases are clear.     Impression: Post pyloric feeding tube. No obstructive bowel gas pattern. I have personally reviewed the examination and initial interpretation and I agree with the findings. FILOMENA WAHL MD   SYSTEM ID:  [id number]    XR Abdomen Port 1 View    Result Date: 10/24/2023  Exam: XR ABDOMEN PORT 1 VIEW, 10/24/2023 2:34 AM Indication: new NGT placement, pt pulled out previous one Comparison: Abdomen x-ray 10/22/2023 Findings: Single portable AP upright view of the abdomen was obtained. Enteric tube tip projects over the stomach. Partially visualized central venous catheter tip projects over the right atrium. Nonobstructive bowel gas pattern. Retained oral contrast throughout the small and large bowel. No pneumatosis or portal venous gas. Midline skin staples and scattered surgical clips. Please see separate chest radiograph for findings in the thorax.     Impression: Enteric tube projects over the stomach. Nonobstructive bowel gas pattern. I have personally reviewed the examination and initial interpretation and I agree with the findings. FILOMENA WAHL MD   SYSTEM ID:  [id number]    XR Video Swallow with SLP or OT    Result Date: 10/23/2023  Examination: Modified Barium Swallow Study  with Speech Pathology, 10/23/2023 9:24 AM. Comparison: None. History: Recently extubated. Assess swallow function. Fluoroscopy time: 2.2 minutes. Technique: Under fluoroscopic guidance, the patient was given orally administered barium of varying consistencies in the presence of the speech pathology service. Findings: Delayed oral phase and swallow initiation. With thin liquid consistency, there was laryngeal penetration and silent tracheal aspiration of both the initial bolus and remaining residue. With mildly thick liquid consistency, there was laryngeal penetration without tracheal aspiration. With moderately thick liquid consistency, there was no laryngeal penetration or tracheal aspiration. Moderate amount of residue in the posterior oral cavity, vallecula, and piriform sinuses throughout the examination.      Impression: 1. Laryngeal penetration and silent tracheal aspiration of thin liquid consistency (both the initial bolus and remaining residue). 2. Laryngeal penetration without tracheal aspiration of mildly thick liquid consistency. Please see the speech pathologist report for further details regarding the modified barium swallow study portion of the examination. I have personally reviewed the examination and initial interpretation and I agree with the findings. ILEANA SIEGEL MD   SYSTEM ID:  [id number]    XR Abdomen Port 1 View    Result Date: 10/22/2023  Exam: XR ABDOMEN PORT 1 VIEW, 10/22/2023 8:32 PM Indication: s/p NG tube placement Comparison: Same day chest x-ray Findings: Gastric tube terminates in the stomach. A feeding tube terminates at the junction of the second and third portions of the duodenum. Nondistended loops of small and large bowel present within the upper abdomen. Surgical clips and skin staples project over the central abdomen. Low lung volumes with streaky perihilar and bibasilar opacities possible small left pleural effusion.     Impression: Feeding tube terminates at the junction  of the second and third portions of the duodenum and additional enteric tube terminates in the stomach. I have personally reviewed the examination and initial interpretation and I agree with the findings. SABINO MURRIETA DO   SYSTEM ID:  [id number]    XR Abdomen Port 1 View    Result Date: 10/22/2023  Exam: XR ABDOMEN PORT 1 VIEW, 10/22/2023 6:54 PM Indication: NG placement Comparison: 10/20/2023 Findings: Enteric tube terminates in the stomach. Previously seen feeding tube is no longer visualized. Nonobstructive bowel gas pattern. Surgical clips and skin staples.     Impression: A single enteric tube terminates in the stomach. Feeding tube no longer visualized. I have personally reviewed the examination and initial interpretation and I agree with the findings. SABINO MURRIETA DO   SYSTEM ID:  [id number]    XR Abdomen Port 1 View    Result Date: 10/20/2023  Exam: XR ABDOMEN PORT 1 VIEW, 10/20/2023 8:12 PM Indication: Confirm NG placement Comparison: Abdomen x-ray 10/19/2023 Findings: Single portable AP 20 degrees upright view of the upper abdomen was obtained. Feeding tube tip in the distal duodenum. Enteric tube with tip and sidehole projecting over the stomach. Partially visualized large bore catheter projecting over the right atrium. Nonobstructive bowel gas pattern. No pneumatosis or portal venous gas. Midline surgical incision. Unchanged diffuse mixed opacities with blunting of left costophrenic angle.     Impression: Enteric tube tip and sidehole projecting over the stomach. Nonobstructive bowel gas pattern. I have personally reviewed the examination and initial interpretation and I agree with the findings. SABINO MURRIETA DO   SYSTEM ID:  [id number]    XR Abdomen Port 1 View    Result Date: 10/19/2023  Exam: XR ABDOMEN PORT 1 VIEW, 10/19/2023 2:21 PM Indication: confirm NJ tube Comparison: Abdomen x-ray 10/18/2023, line placement 10/19/2023 Findings: Single portable AP 20 degrees upright view of the abdomen was  obtained. Feeding tube tip projects within the distal duodenum. Enteric tube tip and sidehole projected expected location of stomach. Partially visualized central venous catheter with tip in the mid right atrium. Nonobstructive bowel gas pattern. No pneumatosis or portal venous gas. Patchy airspace opacities with bilateral lung bases.     Impression: 1. Feeding tube tip in distal duodenum. 2. Enteric tube with tip and sidehole in the stomach. 3. Nonobstructive bowel gas pattern. I have personally reviewed the examination and initial interpretation and I agree with the findings. LANCE VILCHIS DO   SYSTEM ID:  [id number]    IR CVC Tunnel Placement > 5 Yrs of Age    Result Date: 10/18/2023  PROCEDURES 10/18/2023: 1. Ultrasound guidance for venous access 2. Placement centrally inserted catheter (age > 5 yrs.) tunneled, no port, no pump 3. Fluoroscopic guidance placement History: Patient is a 68 year old male with PMH of HTN and previous TIA who presented with R sided abdominal pain found to have contained ruptured AAA, now s/p open AAA repair with renal failure, needs tunneled dialysis catheter for hemodialysis. The patient has right IJ non tunneled dialysis catheter. Comparison: Chest radiograph 10/18/2023. Staff: Dr.Donna Denzel M.D. Fellow/Resident: Ashkan Behzadi , M.D. Monitoring: Patient was placed on continuous monitoring with intravenous conscious sedation administered by the IR nursing staff and supervised by the IR attending. Patient remained stable throughout the procedure. Medications: Running Precedex gtt. 15 cc 1% lidocaine Sedation time, face-to-face: 39 minutes Fluoroscopy time: 1.2 minutes Complications: None Consent: verbal and written informed consent obtained prior to procedure. PROCEDURE: The patient understood the limitations, alternatives, and risks of the procedure and requested the procedure be performed. Both written and oral consent were obtained. Limited jugular ultrasound documented jugular  vein patency. The right neck, indwelling right IJ tunneled dialysis catheter and upper chest were prepped and draped in the usual sterile fashion. Ten to one volume mixture of 1% lidocaine without epinephrine buffered with 8.4% bicarbonate solution was used for local anesthesia. Under ultrasound guidance, right internal jugular venotomy was made with a micropuncture needle. Permanent copy of the image documenting the vein was entered into the patients record. Micropuncture needle exchanged over guidewire for the micropuncture sheath under fluoroscopic guidance. Catheter length measured with the 0.018 guidewire. Micropuncture sheath saline locked. A 19 cm tip to cuff 14.5 [place]  dialysis catheter was subcutaneously tunneled from the right anterior chest to the right internal jugular venotomy site. Micropuncture sheath exchanged over guidewire for the peel-away sheath. Guidewire removed. Under fluoroscopic guidance, the catheter placed through a peel-away sheath and positioned with its tip in the in the upper right atrium. Both lumens flushed and aspirated adequately. Each lumen heparin locked with 1000:1 heparin solution. 2-0 nylon catheter retaining suture and sterile dressing applied. Right internal jugular venotomy site closed with Dermabond. Then attention directed towards removal of the right internal jugular non-tunneled dialysis catheter. The indwelling sutures were removed. The catheter removed and sterile dressing was applied.  No immediate complication.     IMPRESSION: 1. Ultrasound guided right internal jugular venotomy. 2. 19 cm tip to cuff 14.5 [place]  tunneled dialysis catheter placed under fluoroscopic guidance with the tip in the right atrium. Both lumens heparin locked and ready for use. 3. Uncomplicated removal of indwelling right internal jugular non-tunneled dialysis catheter.    IR CVC Non Tunnel Line Removal    Result Date: 10/18/2023  PROCEDURES 10/18/2023: 1. Ultrasound guidance for venous  access 2. Placement centrally inserted catheter (age > 5 yrs.) tunneled, no port, no pump 3. Fluoroscopic guidance placement History: Patient is a 68 year old male with PMH of HTN and previous TIA who presented with R sided abdominal pain found to have contained ruptured AAA, now s/p open AAA repair with renal failure, needs tunneled dialysis catheter for hemodialysis. The patient has right IJ non tunneled dialysis catheter. Comparison: Chest radiograph 10/18/2023. Staff: Dr.Donna Denzel M.D. Fellow/Resident: Ashkan Behzadi , M.D. Monitoring: Patient was placed on continuous monitoring with intravenous conscious sedation administered by the IR nursing staff and supervised by the IR attending. Patient remained stable throughout the procedure. Medications: Running Precedex gtt. 15 cc 1% lidocaine Sedation time, face-to-face: 39 minutes Fluoroscopy time: 1.2 minutes Complications: None Consent: verbal and written informed consent obtained prior to procedure. PROCEDURE: The patient understood the limitations, alternatives, and risks of the procedure and requested the procedure be performed. Both written and oral consent were obtained. Limited jugular ultrasound documented jugular vein patency. The right neck, indwelling right IJ tunneled dialysis catheter and upper chest were prepped and draped in the usual sterile fashion. Ten to one volume mixture of 1% lidocaine without epinephrine buffered with 8.4% bicarbonate solution was used for local anesthesia. Under ultrasound guidance, right internal jugular venotomy was made with a micropuncture needle. Permanent copy of the image documenting the vein was entered into the patients record. Micropuncture needle exchanged over guidewire for the micropuncture sheath under fluoroscopic guidance. Catheter length measured with the 0.018 guidewire. Micropuncture sheath saline locked. A 19 cm tip to cuff 14.5 [place]  dialysis catheter was subcutaneously tunneled from the right  anterior chest to the right internal jugular venotomy site. Micropuncture sheath exchanged over guidewire for the peel-away sheath. Guidewire removed. Under fluoroscopic guidance, the catheter placed through a peel-away sheath and positioned with its tip in the in the upper right atrium. Both lumens flushed and aspirated adequately. Each lumen heparin locked with 1000:1 heparin solution. 2-0 nylon catheter retaining suture and sterile dressing applied. Right internal jugular venotomy site closed with Dermabond. Then attention directed towards removal of the right internal jugular non-tunneled dialysis catheter. The indwelling sutures were removed. The catheter removed and sterile dressing was applied.  No immediate complication.     IMPRESSION: 1. Ultrasound guided right internal jugular venotomy. 2. 19 cm tip to cuff 14.5 [place]  tunneled dialysis catheter placed under fluoroscopic guidance with the tip in the right atrium. Both lumens heparin locked and ready for use. 3. Uncomplicated removal of indwelling right internal jugular non-tunneled dialysis catheter.    POC US Guidance Needle Placement    Femoral nerve block    XR Abdomen Port 1 View    Result Date: 10/18/2023  Examination:  XR ABDOMEN PORT 1 VIEW 10/18/2023 11:09 AM Comparison: 10/17/2023 History: Verify small bowel feeding tube bedside placement Findings: Single AP radiograph of the abdomen. Feeding tube tube coursing below the left hemidiaphragm with the tip projecting over the expected region of the distal duodenum. Second enteric tube with tip and sidehole projecting over the region of the stomach, presumably NG/OG tube. Stable surgical sutures/clips in the mid left abdomen. Partially visualized central line tip projecting over the expected location at the superior vena cava/atrial junction. The air-filled small intestine and colon are not distended with relative paucity of bowel gas. There are no abnormal calcifications. There is no free air. No  evidence of pneumatosis. No portal venous gas. Small left pleural effusion.     Impression: 1. Feeding tube tip projects over the expected location of distal duodenum. 2. Enteric tube tip and sidehole projects over the stomach I have personally reviewed the examination and initial interpretation and I agree with the findings. LILLIAN HAYNES MD   SYSTEM ID:  [id number]    XR Abdomen Port 1 View    Result Date: 10/17/2023  Exam: XR ABDOMEN PORT 1 VIEW, 10/17/2023 3:07 PM Indication: Verify small bowel feeding tube bedside placement Comparison: 10/13/2023 Findings: Supine AP radiograph of the abdomen demonstrates feeding tube coursing below the left hemidiaphragm, with tip projecting over the expected location of distal duodenum. Relative paucity of bowel gas.. Multiple likely overlying monitoring wires with electronic device projecting over the left upper quadrant.     Impression: Feeding tube tip projects over the expected location of the distal duodenum. I have personally reviewed the examination and initial interpretation and I agree with the findings. LILLIAN HAYNES MD   SYSTEM ID:  [id number]    XR Chest Port 1 View    Result Date: 10/16/2023  Exam: XR CHEST PORT 1 VIEW, 10/16/2023 2:45 PM Indication: Assess HD line; not working Comparison: Same day Findings: Right internal jugular central venous catheter tip at the mid SVC. Left subclavian central venous catheter tip at the central left brachiocephalic vein. Endotracheal tube tip at the mid thoracic trachea. Enteric tube courses into the stomach and below the field-of-view. Stable cardiomediastinal silhouette. The pulmonary vasculature is indistinct. Continued small left pleural effusion. No pneumothorax. Stable perihilar and basilar predominant mixed interstitial and patchy airspace opacities.     Impression: 1. Right internal jugular central venous catheter tip in stable position at the mid SVC. 2. Continued small left pleural effusion and perihilar and  basilar predominant mixed interstitial and patchy airspace opacities. SABINO MURRIETA DO   SYSTEM ID:  [id number]    Transesophageal Echocardiogram    Result Date: 10/16/2023   [facility] [address] [place] [id number]  Name: NEWTON BUCKLEY MRN: [id number] : 1955 Study Date: 10/16/2023 02:35 PM Age: 68 yrs Gender: Male Reason For Study: Thrombembolism History: Embolic Event  Performed By: Mikhail Corrales MD BSA: 1.9 m2 Height: 68 in Weight: 173 lb HR: 114 BP: 110/65 mmHg ______________________________________________________________________________ Interpretation Summary No thromboembolic source or shunt identified in cardiac chambers. 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 ______________________________________________________________________________ Procedure Transesophageal Echocardiogram with color and spectral Doppler performed. Bubble study performed. Procedure location Patient Floor. Informed consent for Transesophegeal echo obtained. ADDIS Probe #67 was used during the procedure. Patient was sedated using Fentanyl 100 mcg. Patient was sedated using Versed 3 mg. The heart rate, respiratory rate, oxygen saturations, blood pressure, and response to care were monitored throughout the procedure with the assistance of the nurse. I determined this patient to be an appropriate candidate for the planned sedation and procedure and have reassessed the patient immediately prior to sedation and procedure. Total sedation time: 24 minutes of continuous bedside 1:1 monitoring. The Transducer was inserted without difficulty . The patient tolerated the procedure well. Complications None. The patient's rhythm is normal sinus. Good quality two-dimensional was performed and interpreted. Good quality color and spectral Doppler were performed and interpreted.  Left Ventricle Global and regional left ventricular function is normal  with an EF of 55-60%. Left ventricular size is normal.  Right Ventricle Global right ventricular function is normal. The right ventricle is normal size.  Atria Both atria appear normal. The left atrial appendage Doppler velocities are normal. The left atrial appendage is normal. It is free of spontaneous echo contrast and thrombus. There was no shunt at the atrial septal level as assessed by color Doppler and agitated saline bubble study at rest and with Valsalva maneuver. A catheter is noted in the right atrium.  Mitral Valve The mitral valve is normal. Trace mitral insufficiency is present.  Aortic Valve The aortic valve is tricuspid. Mild aortic valve sclerosis is present.  Tricuspid Valve The tricuspid valve is normal. Trace tricuspid insufficiency is present.  Pulmonic Valve The pulmonic valve is normal. Trace pulmonic insufficiency is present.  Vessels The aorta root is normal. 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 The RUPV Doppler shows normal velocity waveform . The right lower pulmonary vein cannot be assessed. The LUPV Doppler shows normal velocity waveform . The LLPV Doppler shows normal velocity waveform .  Pericardium No pericardial effusion is present.  Compared to Previous Study This study was compared with the study from 09/28/2023 . There is better visualization of cardiac structures but no significant change noted.  ______________________________________________________________________________ Report approved by: Ruperto North 10/16/2023 11:15 PM  ______________________________________________________________________________      XR Chest Port 1 View    Result Date: 10/16/2023  Portable chest INDICATION: Concern for ventilator-assisted pneumonia in the setting of acute respiratory failure COMPARISON: Chest CT 10/13/2023. Plain film 10/13/2023. FINDINGS: Continued prominent retrocardiac density and silhouetting the left hemidiaphragm noted. Increased haziness over the left lower  lung field and the left costophrenic angle is increasingly obscured. Right costophrenic angle remains sharp. Other streaky densities throughout the lungs, left greater than right, are also overall slightly increased on the left and similar on the right. Support devices include endotracheal tube with tip approximately 2.1 cm above the lissa an right IJ catheter tip in the low SVC. NG/OG tube side hole projects in the stomach an tip just beyond the inferior margin of the image. Degenerative spurring in the thoracolumbar spine.     IMPRESSION: Increasing edema likely in the left lower lung with small pleural effusion and retrocardiac atelectasis. Recommend follow-up to clearing to exclude superimposed developing infection. HYACINTH SUAZO MD   SYSTEM ID:  [id number]    XR Abdomen Port 1 View    Result Date: 10/14/2023  Exam: XR ABDOMEN PORT 1 VIEW, 10/13/2023 9:34 PM Indication: confirm NGT placement Comparison:  X-ray abdomen Pelvis 10/10/2023. Findings: AP portable supine radiograph of the abdomen. Nasogastric tube tip and sidehole projected over the left upper quadrant presumably within the stomach. Postsurgical changes of the abdomen, including surgical clips projecting over the spine. The midline wound projects over the low lumbar spine/sacrum. Bowel gas pattern is nonobstructive. No significant stool burden. No visualized portal venous gas or pneumatosis. Degenerative changes of the lower spine.     Impression: 1. NG tube tip and sidehole project over the stomach. 2. Nonobstructive bowel gas pattern. 3. Post surgical changes of the abdomen including wound VAC. I have personally reviewed the examination and initial interpretation and I agree with the findings. ROSARIO ALEMAN MD   SYSTEM ID:  [id number]    XR Chest Port 1 View    Result Date: 10/14/2023  Exam: XR CHEST PORT 1 VIEW, 10/13/2023 9:31 PM Indication: Endotracheal tube positioning Comparison: X-ray chest 10/12/2023. Multiple priors. Findings:  Endotracheal tube tip terminates in the low thoracic trachea approximately 1.5 cm above the level of the lissa. Right IJ central venous catheter tip terminates in the mid SVC. Left upper extremity CVC catheter in stable position with tip near the SVC/junction with the innominate vein. Enteric tube courses inferiorly below the diaphragm and tip out of the field of view, side hole projects over the left upper quadrant presumably within the stomach. Trachea is midline. Cardiomediastinal silhouette is stable. Slight increase in lung volumes. Similar-appearing right basilar hazy opacity. Trace left pleural effusion, right costophrenic angle is silhouetted. Retrocardiac opacity with silhouetting of the left hemidiaphragm. No definitive pneumothorax. Visualized upper abdomen is unremarkable.     Impression: 1. Endotracheal tube tip is in the low thoracic trachea approximately 1.5 cm cephalad to the level of the lissa. 2. Remainder of support devices stable. 3. Increasing patchy opacity at the right lung base likely subsegmental atelectasis. Early infection isn't excluded. 4. Further atelectasis of the left lower lobe with or without small effusion. Consolidation from pneumonia in the left lower lobe isn't excluded. I have personally reviewed the examination and initial interpretation and I agree with the findings. ROSARIO ALEMAN MD   SYSTEM ID:  [id number]    CT Chest/Abdomen/Pelvis w Contrast    Result Date: 10/14/2023  EXAMINATION: CT CHEST/ABDOMEN/PELVIS W CONTRAST, 10/13/2023 11:33 PM COMPARISON STUDY: Abdominal radiograph 10/10/2023 INDICATION: new desat episode, hypotension, eval ? infectious source TECHNIQUE: CT scan of the chest, abdomen and pelvis was performed on multidetector CT scanner using volumetric acquisition technique and images were reconstructed in multiple planes with variable thickness and reviewed on dedicated workstations. CONTRAST: iopamidol (ISOVUE-370) solution 104 m. Without oral contrast. CT  scan radiation dose is optimized to minimum requisite dose using automated dose modulation techniques. Findings: Chest: Lungs: Bilateral small-to-moderate pleural effusions with associated atelectasis and ground glass opacities, left greater than right. Basilar predominant interlobular septal thickening. No pneumothorax. Mediastinum: Heart size is within normal limits. No pericardial effusion. Aorta and main pulmonary artery caliber are within normal limits No mediastinal lymphadenopathy. Small volume of air within the left brachiocephalic vein (series 5, image 33) just prior to entering the superior vena cava, likely iatrogenic secondary left upper extremity central venous catheter. Enteric tube coursing the esophagus terminating in the stomach lumen, otherwise the esophagus is unremarkable.   Abdomen/Pelvis:  Liver: No mass. Mild intrahepatic biliary dilatation. Hypodensity at the lateral aspect of the right hepatic lobe measuring up to 11 mm (series 5, image 103). Gallbladder/CBD: Mildly distended gallbladder with layering sludge. Common bile duct within normal limits for patient's age. Pancreas: Pancreas appears to be normal enhancing with peripancreatic hypoattenuating collection this primarily hypoattenuating but with areas of increased hyperattenuation. This suggests peripancreatic fluid collection with hemorrhage. Spleen: Surrounded by hypoattenuating fluid, otherwise unremarkable. Adrenal glands: Normal. Kidneys/ureters/bladder: Heterogeneous hypoattenuation of the kidneys bilaterally with wedgelike confluent hypoattenuation in the interpolar and superior pole of the left kidney (series series 5, images 127-156). No obstructing renal stone, hydronephrosis, renal masses. Genitourinary/reproductive tract: Normal bladder. Reproductive organs are within normal limits. Gastrointestinal: Hypoattenuating of the large bowel with wall thickening starting from the rectum and extending to the mid transverse colon with  surrounding fat stranding ([id number]). Colonic diverticulosis. Normal caliber small bowel. Appendix not seen. Stomach and esophagus are unremarkable. Vasculature: Postsurgical changes of ruptured AAA open repair with mixed attenuation clot surrounding the postsurgical repairs. Narrow lumen inferior vena cava compressed by hematoma at the level of the kidneys. Small hematoma at the aortic bifurcation. Patent hepatic portal vein. Retroperitoneum/peritoneum/mesentery: Ascites throughout the peritoneal cavity surrounding the liver or spleen, infrarenal aorta and layering in the pelvis. 2 cm hematoma at the aortic bifurcation. 2 cm hematoma just below the level of the renal veins. Bones: No acute or aggressive appearing osseous bodies. Ultimately degenerative changes of the spine. Soft tissues: Enlarged left iliacus muscle with hypodense heterogeneous appearance measuring up to 6.6 cm (series 5, image 224). Diffuse anasarca.     IMPRESSION: 1.  Enlarged heterogeneously hypoattenuating left illiacus muscle measuring up to 6.6 cm. Findings may represent developing iliacus hematoma. Consider CTA abdomen/pelvis to further evaluate for active bleeding within the hematoma. 2.  Narrow caliber infrahepatic IVC with appearance of compression in between thrombus secondary to AAA rupture and abdominal aorta; findings may represent both hypotension and potential compression secondary to the surrounding hematoma. 3.  Bilateral, left greater than right, wedge-shaped hypodensities suggesting infarcts. 4.  Edematous left colon. There appears to be mucosal enhancement. No evidence of pneumatosis intestinalis or free air in the abdomen. 5.  Pancreas appears perfused but there is peripancreatic fluid with hemorrhage. No pseudoaneurysm or extravasation identified. 6.  Diffuse intraperitoneal ascites and mixed attenuating hematoma, along the right inferior renal aorta and aortic bifurcation, likely related to recent AAA rupture and subsequent  repair. 7.  Bilateral pleural effusions with associated atelectasis and groundglass opacities, likely representing pulmonary edema. 8.  Diffuse anasarca. [Urgent Result: Suspected left iliac is hematoma measuring up 6.6 cm; suspected devascularized interpolar and superior left kidney.] Finding was identified on 10/14/2023 12:09 PM. Dr. Ada Donald was contacted by Dr. Luis Good at 10/14/2023 12:25 AM and verbalized understanding of the urgent finding. Preliminary report by the resident has not talked about the peripancreatic fluid collection with hemorrhage. There is not the vascularization of the left kidney but rather renal infarcts. Change from the preliminary report was communicated to the surgical team at 7:30 AM. I have personally reviewed the examination and initial interpretation and I agree with the findings. ROSARIO ALEMAN MD   SYSTEM ID:  [id number]    XR Chest Port 1 View    Result Date: 10/12/2023  Exam: XR CHEST PORT 1 VIEW, 10/12/2023 3:08 PM Comparison: 10/11/2023 History: Increased work of breathing Findings: Portable AP view of the chest. Stable right IJ central venous catheter tip in the IVC. Gastric tube tip and sidehole projecting over the stomach. Left PICC terminates over the innominate vein. Heart size normal. Low lung volumes with similar perihilar and bibasilar opacities. Small left pleural effusion. No pneumothorax. No acute osseous abnormality.     Impression: 1. Low lung volumes with similar perihilar and bibasilar opacities, likely atelectasis and/or edema. Stable small left pleural effusion. 2. Stable positioning of support devices. Left PICC tip terminates over the innominate vein. I have personally reviewed the examination and initial interpretation and I agree with the findings. HYACINTH SUAZO MD   SYSTEM ID:  [id number]    US Testicular & Scrotum w Doppler Ltd    Result Date: 10/12/2023  EXAM: Scrotal/testicular ultrasound 10/12/2023 10:23 AM HISTORY: Persistent  scrotal pain and edema, want to rule out ongoing pathology. COMPARISON: None. TECHNIQUE: The scrotum was scanned in standard fashion with specialized ultrasound transducer(s) using grey scale, color Doppler, and spectral flow  techniques. FINDINGS: RIGHT TESTES: The right testes measures 3.9 x 1.6 x 2.2 cm and demonstrates normal, symmetric echotexture. The testes is located within the scrotum and demonstrates a normal shape. There is normal color Doppler flow and spectral waveform, without evidence of torsion. No focal mass lesion. There is an epididymal head cyst measuring 0.5 x 0.8 x 0.5 cm. There is a small tunica albuginea cyst measuring 0.2 x 0.1 x 0.2 cm. There is no significant hydrocele or varicocele. LEFT TESTES: The left testes measures 3.8 x 2.0 x 2.7 cm and demonstrates normal, symmetric echotexture. The testes is located within the scrotum and demonstrates a normal shape. There is normal color Doppler flow and spectral waveform, without evidence of torsion. No focal mass lesion. There are 2 epididymal head cysts measuring 0.6 x 0.6 x 0.6 cm and 0.4 x 0.5 x 0.4 cm. There is mild left hydrocele. No  varicocele.     IMPRESSION: 1.  No acute abnormality such as torsion or epididymo-orchitis. 2.  Small bilateral epididymal head cysts. 3. Mild left hydrocele. I have personally reviewed the examination and initial interpretation and I agree with the findings. ABIOLA NAPOLES MD   SYSTEM ID:  [id number]    XR Chest Port 1 View    Result Date: 10/11/2023  Exam: XR CHEST PORT 1 VIEW, 10/11/2023 1:15 AM Comparison: Chest radiograph 10/10/2023 History: stability Findings: Portable AP view of the chest. Right IJ catheter terminates at the upper SVC. Left upper extremity PICC line with tip at the brachiocephalic confluence. Enteric tube tip and side hole collimated out of the field-of-view. Cardiac silhouette is within normal limits. Improved lung volumes. Small left pleural effusion. No pneumothorax. Silhouetting of  the left hemidiaphragm. Persistence of perihilar and bibasilar streaky opacities. No acute osseous abnormality.     Impression: 1. Improved aeration with decreased pulmonary opacities likely due to atelectasis and/or pulmonary edema. Stable small left pleural effusion. 2. Stable support devices. I have personally reviewed the examination and initial interpretation and I agree with the findings. HYACINTH SUAZO MD   SYSTEM ID:  [id number]    XR Abdomen Port 1 View    Result Date: 10/10/2023  Exam: XR ABDOMEN PORT 1 VIEW, 10/10/2023 6:39 PM Indication: Feeding tube Comparison: Same day abdominal radiograph Findings: Portable AP supine radiograph the abdomen. Gastric tube tip and sidehole projecting over the stomach. Nonobstructive bowel gas pattern. No pneumatosis or portal venous gas. Surgical clips project over the midline abdomen. Visualized lung bases are clear. No acute osseous abnormality.     Impression: Gastric tube tip and sidehole project over the stomach. No additional feeding tube visualized. I have personally reviewed the examination and initial interpretation and I agree with the findings. FILOMENA WAHL MD   SYSTEM ID:  [id number]    XR Abdomen Port 1 View    Result Date: 10/10/2023  EXAM: Abdominal radiograph 10/10/2023 9:05 AM HISTORY: assess distention. COMPARISON: 10/9/2023. TECHNIQUE: Portable frontal supine view(s) of the abdomen. FINDINGS: Partially visualized enteric tube tip and sidehole projecting over the stomach. There is a relative paucity of bowel gas in the left hemiabdomen. Nonobstructive bowel gas pattern. There is no pneumatosis or portal venous gas where visualized. Surgical clips project over the left abdomen. No acute osseous abnormality.     IMPRESSION: No obstructive bowel gas pattern. I have personally reviewed the examination and initial interpretation and I agree with the findings. JOAN LEBRON MD   SYSTEM ID:  [id number]    XR Chest Port 1 View    Result Date:  10/10/2023  Exam: XR CHEST PORT 1 VIEW, 10/10/2023 9:02 AM Comparison: Chest x-ray dated 10/9/2023, 10/8/2023 and multiple priors. History: Admitted to the ICU for an open AAA repair and suspected emboli to left lower extremity with ischemia. Concern for pneumonia Findings: Portable AP view of the chest. Right and left IJ catheter terminates at the upper SVC. Enteric tube in stable position. Cardiac silhouette is within normal limits. Low lung volumes. Small left pleural effusions. Silhouetting of the left hemidiaphragm. Persistence of perihilar and bibasilar streaky opacities. No acute osseous abnormality.     Impression: Persistent low lung volumes and pulmonary opacities likely due to atelectasis and/or pulmonary edema but cannot exclude infectious process. Stable small left pleural effusion. Positioning of support devices are unchanged. I have personally reviewed the examination and initial interpretation and I agree with the findings. SABINO MURRIETA DO   SYSTEM ID:  [id number]    XR Chest Port 1 View    Result Date: 10/9/2023  Exam: XR CHEST PORT 1 VIEW, 10/9/2023 8:28 PM Indication: s/p aspiration risk, fever, concern for pneumonia Comparison: X-ray chest 10/9/2023, 1329. Multiple priors. Findings: AP portable supine radiograph of the chest. Right IJ CVC tip terminates in the mid SVC. Enteric tube courses below the diaphragm with tip out of the field of view. Stable positioning of left upper extremity PICC with tip terminating in the superior SVC near the junction with the innominate vein. Stable cardiac mediastinal silhouette. Low lung volumes. Similar-appearing bibasilar hazy opacities, left greater than right. Slight interval increase in the small left pleural effusion, no right pleural effusion. No definite pneumothorax. Visualized upper abdomen unremarkable.     Impression: 1. Persistent low lung volumes and basilar atelectasis. Superimposed aspiration or infection is not excluded. 2. Small left pleural  effusion. I have personally reviewed the examination and initial interpretation and I agree with the findings. LANCE VILCHIS DO   SYSTEM ID:  [id number]    XR Abdomen Port 1 View    Result Date: 10/9/2023  Exam: XR ABDOMEN PORT 1 VIEW, 10/9/2023 2:18 PM Indication: s/p NG tube placement Comparison: 10/8/2023 Findings: Portable AP upright view of the abdomen. Feeding tube is been removed. Gastric tube tip and sidehole project over the stomach. Surgical clips project over the midline lower abdomen. Gaseous distended small and large bowel and stomach in a nonobstructive pattern. No pneumatosis or portal venous gas. Bibasilar atelectasis.     Impression: Gastric tube tip and sidehole project over the stomach. I have personally reviewed the examination and initial interpretation and I agree with the findings. SABINO MURRIETA DO   SYSTEM ID:  [id number]    XR Chest Port 1 View    Result Date: 10/9/2023  Exam: XR CHEST PORT 1 VIEW, 10/9/2023 1:52 PM Comparison: 10/6/2023 History: s/p emesis, concern for aspiration Findings: Portable AP view of the chest. Right internal jugular central venous catheter with tip projecting over the SVC. Left subclavian line with tip projecting over the right brachiocephalic vein confluence. Gastric tube tip at the distal esophagus with sidehole in the thoracic esophagus. Low lung volumes with diffuse interstitial and airspace opacities. Small bilateral pleural effusions. No pneumothorax. Stable cardiac silhouette. No acute osseous abnormality.      Impression: 1. Low lung volumes with ongoing mixed diffuse opacities, representing atelectasis/edema versus infection. Findings could also potentially represent aspiration. 2. Similar small bilateral pleural effusions. 3. Gastric tube tip at the distal esophagus with sidehole in the thoracic esophagus. The gastric tube appears advanced in subsequent abdominal radiograph. I have personally reviewed the examination and initial interpretation and I agree  with the findings. HYACINTH SUAZO MD   SYSTEM ID:  [id number]    XR Abdomen Port 1 View    Result Date: 10/8/2023  Exam: XR ABDOMEN PORT 1 VIEW, 10/8/2023 6:13 PM Indication: Nausea, vomiting, ?gastric distension? Comparison: 10/4/2023 Findings: Single portable AP view of the abdomen. Feeding tube with the tip in the distal duodenum. Nonobstructive bowel gas pattern. Normal stomach bubble. No acute osseous abnormalities.     Impression: Nonobstructive bowel gas pattern. Feeding tube with tip in the distal duodenum. I have personally reviewed the examination and initial interpretation and I agree with the findings. HERNANDO HERNANDEZ MD   SYSTEM ID:  [id number]

## 2023-11-08 ENCOUNTER — APPOINTMENT (OUTPATIENT)
Dept: PHYSICAL THERAPY | Facility: CLINIC | Age: 68
DRG: 268 | End: 2023-11-08
Payer: COMMERCIAL

## 2023-11-08 ENCOUNTER — APPOINTMENT (OUTPATIENT)
Dept: SPEECH THERAPY | Facility: CLINIC | Age: 68
DRG: 268 | End: 2023-11-08
Payer: COMMERCIAL

## 2023-11-08 ENCOUNTER — DOCUMENTATION ONLY (OUTPATIENT)
Dept: ORTHOPEDICS | Facility: CLINIC | Age: 68
End: 2023-11-08
Payer: COMMERCIAL

## 2023-11-08 LAB
ANION GAP SERPL CALCULATED.3IONS-SCNC: 12 MMOL/L (ref 7–15)
BUN SERPL-MCNC: 58.8 MG/DL (ref 8–23)
CALCIUM SERPL-MCNC: 9.1 MG/DL (ref 8.8–10.2)
CHLORIDE SERPL-SCNC: 104 MMOL/L (ref 98–107)
CREAT SERPL-MCNC: 1.98 MG/DL (ref 0.67–1.17)
DEPRECATED HCO3 PLAS-SCNC: 20 MMOL/L (ref 22–29)
EGFRCR SERPLBLD CKD-EPI 2021: 36 ML/MIN/1.73M2
ERYTHROCYTE [DISTWIDTH] IN BLOOD BY AUTOMATED COUNT: 16.2 % (ref 10–15)
GLUCOSE SERPL-MCNC: 119 MG/DL (ref 70–99)
HCT VFR BLD AUTO: 23.4 % (ref 40–53)
HGB BLD-MCNC: 7.6 G/DL (ref 13.3–17.7)
MAGNESIUM SERPL-MCNC: 2 MG/DL (ref 1.7–2.3)
MCH RBC QN AUTO: 31 PG (ref 26.5–33)
MCHC RBC AUTO-ENTMCNC: 32.5 G/DL (ref 31.5–36.5)
MCV RBC AUTO: 96 FL (ref 78–100)
PHOSPHATE SERPL-MCNC: 4.9 MG/DL (ref 2.5–4.5)
PLATELET # BLD AUTO: 143 10E3/UL (ref 150–450)
POTASSIUM SERPL-SCNC: 4.8 MMOL/L (ref 3.4–5.3)
RBC # BLD AUTO: 2.45 10E6/UL (ref 4.4–5.9)
SODIUM SERPL-SCNC: 136 MMOL/L (ref 135–145)
UFH PPP CHRO-ACNC: 0.26 IU/ML
WBC # BLD AUTO: 6.8 10E3/UL (ref 4–11)

## 2023-11-08 PROCEDURE — 92526 ORAL FUNCTION THERAPY: CPT | Mod: GN

## 2023-11-08 PROCEDURE — 97542 WHEELCHAIR MNGMENT TRAINING: CPT | Mod: GP | Performed by: REHABILITATION PRACTITIONER

## 2023-11-08 PROCEDURE — 97110 THERAPEUTIC EXERCISES: CPT | Mod: GP | Performed by: REHABILITATION PRACTITIONER

## 2023-11-08 PROCEDURE — 97530 THERAPEUTIC ACTIVITIES: CPT | Mod: GP | Performed by: REHABILITATION PRACTITIONER

## 2023-11-08 PROCEDURE — 80048 BASIC METABOLIC PNL TOTAL CA: CPT | Performed by: NURSE PRACTITIONER

## 2023-11-08 PROCEDURE — 84100 ASSAY OF PHOSPHORUS: CPT | Performed by: NURSE PRACTITIONER

## 2023-11-08 PROCEDURE — 250N000013 HC RX MED GY IP 250 OP 250 PS 637: Performed by: SURGERY

## 2023-11-08 PROCEDURE — 250N000013 HC RX MED GY IP 250 OP 250 PS 637

## 2023-11-08 PROCEDURE — 83735 ASSAY OF MAGNESIUM: CPT

## 2023-11-08 PROCEDURE — 250N000013 HC RX MED GY IP 250 OP 250 PS 637: Performed by: NURSE PRACTITIONER

## 2023-11-08 PROCEDURE — 250N000013 HC RX MED GY IP 250 OP 250 PS 637: Performed by: STUDENT IN AN ORGANIZED HEALTH CARE EDUCATION/TRAINING PROGRAM

## 2023-11-08 PROCEDURE — 250N000011 HC RX IP 250 OP 636: Mod: JZ | Performed by: NURSE PRACTITIONER

## 2023-11-08 PROCEDURE — 85027 COMPLETE CBC AUTOMATED: CPT | Performed by: NURSE PRACTITIONER

## 2023-11-08 PROCEDURE — 36415 COLL VENOUS BLD VENIPUNCTURE: CPT | Performed by: NURSE PRACTITIONER

## 2023-11-08 PROCEDURE — 85520 HEPARIN ASSAY: CPT | Performed by: INTERNAL MEDICINE

## 2023-11-08 PROCEDURE — 120N000003 HC R&B IMCU UMMC

## 2023-11-08 PROCEDURE — 99024 POSTOP FOLLOW-UP VISIT: CPT | Performed by: NURSE PRACTITIONER

## 2023-11-08 RX ORDER — LOPERAMIDE HCL 2 MG
4 CAPSULE ORAL 3 TIMES DAILY
Status: DISCONTINUED | OUTPATIENT
Start: 2023-11-08 | End: 2023-11-10

## 2023-11-08 RX ORDER — METOPROLOL TARTRATE 25 MG/1
25 TABLET, FILM COATED ORAL ONCE
Status: COMPLETED | OUTPATIENT
Start: 2023-11-08 | End: 2023-11-08

## 2023-11-08 RX ORDER — METOPROLOL TARTRATE 50 MG
100 TABLET ORAL 2 TIMES DAILY
Status: DISCONTINUED | OUTPATIENT
Start: 2023-11-08 | End: 2023-11-17 | Stop reason: HOSPADM

## 2023-11-08 RX ADMIN — Medication 60 ML: at 07:58

## 2023-11-08 RX ADMIN — AMLODIPINE BESYLATE 10 MG: 10 TABLET ORAL at 07:57

## 2023-11-08 RX ADMIN — Medication 40 MG: at 07:57

## 2023-11-08 RX ADMIN — METOPROLOL TARTRATE 100 MG: 50 TABLET, FILM COATED ORAL at 21:09

## 2023-11-08 RX ADMIN — Medication 1 MG: at 19:01

## 2023-11-08 RX ADMIN — LOPERAMIDE HYDROCHLORIDE 4 MG: 2 CAPSULE ORAL at 21:08

## 2023-11-08 RX ADMIN — ACETAMINOPHEN 975 MG: 325 TABLET, FILM COATED ORAL at 09:50

## 2023-11-08 RX ADMIN — ACETAMINOPHEN 975 MG: 325 TABLET, FILM COATED ORAL at 03:54

## 2023-11-08 RX ADMIN — PANCRELIPASE 1 CAPSULE: 24000; 76000; 120000 CAPSULE, DELAYED RELEASE PELLETS ORAL at 16:08

## 2023-11-08 RX ADMIN — ACETAMINOPHEN 975 MG: 325 TABLET, FILM COATED ORAL at 21:08

## 2023-11-08 RX ADMIN — HYDROXYZINE HYDROCHLORIDE 25 MG: 25 TABLET ORAL at 00:30

## 2023-11-08 RX ADMIN — PANCRELIPASE 1 CAPSULE: 24000; 76000; 120000 CAPSULE, DELAYED RELEASE PELLETS ORAL at 13:30

## 2023-11-08 RX ADMIN — QUETIAPINE FUMARATE 50 MG: 50 TABLET ORAL at 07:57

## 2023-11-08 RX ADMIN — TRIAMCINOLONE ACETONIDE: 1 OINTMENT TOPICAL at 08:06

## 2023-11-08 RX ADMIN — QUETIAPINE FUMARATE 100 MG: 100 TABLET ORAL at 21:09

## 2023-11-08 RX ADMIN — HYDROCORTISONE: 25 OINTMENT TOPICAL at 08:06

## 2023-11-08 RX ADMIN — ACETAMINOPHEN 975 MG: 325 TABLET, FILM COATED ORAL at 16:02

## 2023-11-08 RX ADMIN — HEPARIN SODIUM 1400 UNITS/HR: 10000 INJECTION, SOLUTION INTRAVENOUS at 07:48

## 2023-11-08 RX ADMIN — METOPROLOL TARTRATE 75 MG: 50 TABLET, FILM COATED ORAL at 07:57

## 2023-11-08 RX ADMIN — TRIAMCINOLONE ACETONIDE: 1 OINTMENT TOPICAL at 21:16

## 2023-11-08 RX ADMIN — HYDROCORTISONE: 25 OINTMENT TOPICAL at 21:16

## 2023-11-08 RX ADMIN — ATORVASTATIN CALCIUM 10 MG: 10 TABLET, FILM COATED ORAL at 21:09

## 2023-11-08 RX ADMIN — METOPROLOL TARTRATE 25 MG: 25 TABLET, FILM COATED ORAL at 11:36

## 2023-11-08 RX ADMIN — PSYLLIUM HUSK 1 PACKET: 3.4 POWDER ORAL at 11:36

## 2023-11-08 RX ADMIN — LOPERAMIDE HYDROCHLORIDE 4 MG: 2 CAPSULE ORAL at 15:16

## 2023-11-08 ASSESSMENT — ACTIVITIES OF DAILY LIVING (ADL)
ADLS_ACUITY_SCORE: 44
ADLS_ACUITY_SCORE: 48
ADLS_ACUITY_SCORE: 42
ADLS_ACUITY_SCORE: 44
ADLS_ACUITY_SCORE: 44
ADLS_ACUITY_SCORE: 48
ADLS_ACUITY_SCORE: 48
ADLS_ACUITY_SCORE: 46
ADLS_ACUITY_SCORE: 42
ADLS_ACUITY_SCORE: 42
ADLS_ACUITY_SCORE: 44
ADLS_ACUITY_SCORE: 46

## 2023-11-08 NOTE — PROGRESS NOTES
Vascular Surgery Progress Note  11/08/2023       Subjective:  Continues to have renal recovery, with good UO. TF formula changed yesterday for potential pancreatic insufficiency. Improving frequency of stools (none overnight).     Objective:  Temp:  [97.9  F (36.6  C)-98.8  F (37.1  C)] 98  F (36.7  C)  Pulse:  [] 102  Resp:  [11-22] 14  BP: (103-154)/(72-94) 154/89  SpO2:  [95 %-100 %] 99 %    I/O last 3 completed shifts:  In: 840 [NG/GT:170]  Out: 975 [Urine:800; Drains:175]      Gen: resting comfortably in bed, answering questions appropriately, no delirium/confusion, whispering words, not in acute distress  CV: tachycardic per tele with PVCs  Resp: non-labored, on room air   Abd: Abdominal incision c/d/I. No active bleeding, abdomen soft, non-tender to light palpation.  Ext: RLE wwp, palpable DP pulse, LLE stump incision with continued improved surrounding erythema similar to erythema of entire body, no obvious bleeding, soft, incisional prevena holding seal.    Prevena LLE placed on 11/06/23 - to be replaced in 5-7 days  Skin: Entire body with blanching erythema covering trunk arms, and legs significantly improved.     Labs:  Recent Labs   Lab 11/08/23  0509 11/07/23  0545 11/06/23  0453   WBC 6.8 7.1 7.8   HGB 7.6* 7.7* 7.8*   * 156 158       Recent Labs   Lab 11/08/23  0509 11/07/23  0545 11/06/23  0453    138 138   POTASSIUM 4.8 4.8 4.8   CHLORIDE 104 106 105   CO2 20* 20* 22   BUN 58.8* 63.5* 69.8*   CR 1.98* 2.05* 2.12*   * 127* 134*   OMAR 9.1 9.3 9.2   MAG 2.0 2.1 2.0   PHOS 4.9* 5.0* 5.2*      Assessment/Plan:   Alrfedo Burnham is a 68 year old male with PMH of HTN and previous TIA who presented with R sided abdominal pain found to have contained ruptured AAA, now s/p open AAA repair 9/24 into 9/25am with 30 min supraceliac clamp time, prolonged inter-renal clamp time, temporary abdominal closure. He did have bloody stool postop with flex sig 9/25 demonstrating ischemic  mucosal changes of the rectum, repeated abdominal without evidence of transmural necrosis of the small or large intestine, or the rectum. On 9/29 he was started on CRRT and subsequently iHD. S/p closure of abdominal fascia and subcutaneous tissue wound VAC applied 10/2/23. 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 On 10/20/23 underwent abdominal incision closure.     10/24/23 found to have hgb 5.8, CTA demonstrated left psoas muscle hematoma now size 6 cm x 3 cm x 3 cm, barely seen on CTA from 10/13. Lesser sac of the peritoneal cavity collection stable, hematoma in left iliac stable, no active extravasation. Hep gtt held.     Overnight 10/24-10/25 patient more lethargic with fevers at 102.1 thought to be strokelike symptoms, stroke code called, workup negative. Hemoglobin dropped again to 5.8, despite holding heparin for 24 hours. Repeat CTA: demonstrated hematoma in LLE stump, with otherwise stable left iliac hematoma and stable left psoas muscle hematoma. Now resoled: patient's hgb stable, he is progressing well with continued improvements. Off iHD with renal recovery, tunneled line removed and cleared by SLP for diet.     Neuro:   - Seroquel at bedtime and as needed for anxiety/delirium episodes  - Previously had stroke code called for unequal pupils, c/f facial droop 10/6: workup revealed: Numerous, punctate late hyperacute to acute infarctions thought to be embolic in nature. No embolic source identified in workup.  - Follow-up with neurology 4-6 weeks after discharge, ok with a/c.     HEENT:   - ENT consult for ongoing dysphonia/dysphagia: endoscopy with L vocal cord paralysis. S/p bedside vocal fold injection 11/1/23 with otolaryngology.    CV:   - Amlodipine 10mg daily (at home on amlodipine 5mg and benazepril 20mg) due to SBP>160 requiring Labetalol IV. No benzepril for now. Added Metoprolol, up to 100mg PO given ongoing tachycardia.   - Goal SBP <160, MAP >65  - Labetalol prn for SBP >160  - Continue statin  -  PE+, venous duplex with nonocclusive to occlusive thrombus of the left GSV from the level of the knee to the groin and nonocclusive thrombus of the left distal femoral vein and popliteal veins, increased in extent compared to 9/30/2023.   - Heparin drip low intensity, with therapeutic anti-Xa, no plan to change to high intensity, appropriately anticoagulated. Will transition to DOAC potentially tomorrow 11/9/23, will remain on heparin drip until the day before transfer to rehab.    Resp:   - On room air, CXR clear    GI:   - Concern for Pancreatitis with peripancreatic fluid collection on CT 10/13 with question of bleeding into the collection. GI signed off as collection not drainable. - Improved appearance on repeat CT 10/25. Monitoring.    - TF tolerating via NJT, nocturnal feeds: addition of Relizorb per pt showing signs of pancreatic insufficiency and changing the TF formula  - Nutrition following, appreciate recommendations, on calorie count: Monitor ability for pt to consume at least ~1250 Kcals and ~65 gm protein before considering removal of FT (65% est needs)   - When stool consistency firms, will order fecal elastase levels to officially check for pancreatic insufficiency. Holding off on this at present, as watery stool sample will result in falsely diluted levels (less accurate).  - Cleared by SLP For IDDSI level 6 soft and bite sized diet with level 3 moderately thickened liquids (NO straws, must be sitting upright, alert).  - Change abd dressing daily  - Loose stools -- added psyllium daily and imodium 3x/day. C. difficile negative.    FEN:   - Electrolyte replacement prn     Renal:   - Cr downtrending 2.05  - Last iHD 10/27. Lasix challenge 10/28 with great response. Continues to make good amount of urine.   - Tunneled line removed with IR 11/2/23, nephrology signed off    Heme:   - Hgb 7.7  - DVT as above, PE   - Heparin drip low intensity, with therapeutic anti-Xa. No plan to transition to high  intensity, appropriately anticoagulated. Will transition to DOAC the day prior to discharge.    ID:   -No further fevers, no leukocytosis. Patient has allergy to cefepime (hives). Continue to monitor for fevers, leukocytosis.  - monitor signs of pneumonia  - nystatin swish for oral candida    MSK:   - L AKA 10/17/23: prevena applied 10/30/23, to stay on for 5 to 7 days  - ACE wrap on L AKA at all times  - Orthotics provided stump protector and  11/1/23    Derm:   - Rash significantly improved, not cellulitis. Pharm reviewed meds, all low risk, derm consulted, ID has seen as well, all felt were likely drug induced presumably from cefepime  - Did receive 1 dose cefepime 10/25 - this may be cause of worsening. May take 1-2 weeks to clear up.  - Appreciate dermatology recs, likely morbilliform eruption vs DRESS (low risk)    - triamcinolone 0.1% changed back to BID scheduled     Misc:   - Pediatric/Small tubes for blood draws (spoke with lab, included this request in daily lab draws)  - Appreciate aggressive PT/OT ROM, patient severely deconditioned and requires extensive physical therapy: Okay with lifting the equivalent of a gallon of water. Restrict utilizing upper extremities for lifting himself, transfers with upper extremities to chair, etc until 11/16/23 after which can be liberalized.  - Abd binder on at all times.  - Please keep LLE AKA wrapped with ACE.  - IMC status.  - Anticipate will require ARU at discharge (SW engaged) by the end of the week. ARU vs TCU; PMR to guide on which would patient benefit most of.         Discussed patient with Dr. Lowe, vascular surgery staff.    Miryam Carrasco, [facility]  Vascular Surgery  Pager: [phone]  [email or web address]  Send message or 10 digit call back number Securely via [employer] with the Vocera Web Console (learn more here)

## 2023-11-08 NOTE — PROGRESS NOTES
Pt seen bedside at [facility] room 6227 with wife and reports that they have no questions regarding prosthetics 1 week post op. His wife reports that a staff member took his AK protector apart and she has not tried to put it back together again and asked if I could do that for her. I did and re-educated her on assembly and donning and doffing. She seemed to understand. The patient was more alert but still not having any questions regarding prosthetics. FU PRN. John Paul Bae , LPO

## 2023-11-08 NOTE — PROGRESS NOTES
Care Management Follow Up    Length of Stay (days): 45    Expected Discharge Date: 11/10/2023     Concerns to be Addressed: all concerns addressed in this encounter     Patient plan of care discussed at interdisciplinary rounds: Yes    Anticipated Discharge Disposition:  ARU     Anticipated Discharge Services:  rehabilitative services  Anticipated Discharge DME:  None    Patient/family educated on Medicare website which has current facility and service quality ratings:  Yes  Education Provided on the Discharge Plan:  Yes  Patient/Family in Agreement with the Plan:  Yes    Referrals Placed by CM/SW:      [facility]  [address], [place] [address]  Ph: [phone]  11/8: SW spoke [facility] (Agustina) re: PMR consult on 11/7. Rehab Admissions will plan to re-review pt on Friday after PMR reassesses. Agustina noted that it is ok for pt to be on hep gtt until then. ARU will need DOAC plan.    Private pay costs discussed: Not applicable    Additional Information:  SW following for discharge planning. PMR consult from 11/7 reporting that they will plan to reassess on Friday to offer better recommendation. PT/OT from yesterday recommending ARU. Per Vascular Surgery (Miryam Carrasco, SACHIN), pt will plan to remain on heparin gtt until day before discharge and then transition to DOAC. Anticipating that pt will be medically ready at the end of the week.     LIZBET and SW colleague (Melody) met with pt and pt spouse (Tabby) to follow up on discharge discussion from Monday and obtain ARU choices. Tabby reported that they were interested in [facility]. LIZBET explained that [facility] was following pt and was planning to re-review pt on Friday after PMR reassesses pt. Pt is a  and Tabby noted that they were curious if the [facility] will assist with paying for pt's prosthetics or how they would find this out. SW uncertain about this but can attempt to look into resource to assist pt/family with figuring this out  and noted that ARU typically assists with starting the process for getting prosthetics and may be more knowledgeable on this. Tabby also noted that they were interested in having a  visit pt again. After visit, SW saw that Spiritual Health consult has already been placed. SW to keep pt/Tabby updated on discharge planning but likely will not have an update until Friday.     SW will continue to follow for discharge planning.     JR Garcia   [facility]   6B SW Ph: [phone]

## 2023-11-08 NOTE — PLAN OF CARE
[id number]  Neuro: A&Ox4. Legal blindness. Forgetful, easily irritable and sometimes anxious.  Cardiac: SR. VSS. HR 80's-90's   Respiratory: Sating >95% on RA. Denies SOB and chest pain.  GI/: Adequate urine output with urinal. No BM on this shift.  Diet/appetite: Tolerating soft bite size diet and cycled TF 1267-3505 @55ml/hr w/ 30ml q4 FWF . Poor appetite. Denies nausea.  Activity:  Lift assist, not oob this shift. Able to turn side to side during cares.  Pain: At acceptable level on current regimen. Intermittent nerve pain.  Skin: No new deficits noted.  LDA's: 2 PIV left SL & right running hep @1400units/hr. NJ running cycled TF. L AKA wound vac to suction 125.    Plan: Continue with POC. Notify primary team with changes.

## 2023-11-09 ENCOUNTER — APPOINTMENT (OUTPATIENT)
Dept: PHYSICAL THERAPY | Facility: CLINIC | Age: 68
DRG: 268 | End: 2023-11-09
Payer: COMMERCIAL

## 2023-11-09 LAB
ANION GAP SERPL CALCULATED.3IONS-SCNC: 12 MMOL/L (ref 7–15)
BUN SERPL-MCNC: 56.4 MG/DL (ref 8–23)
CALCIUM SERPL-MCNC: 9.1 MG/DL (ref 8.8–10.2)
CHLORIDE SERPL-SCNC: 102 MMOL/L (ref 98–107)
CREAT SERPL-MCNC: 1.93 MG/DL (ref 0.67–1.17)
DEPRECATED HCO3 PLAS-SCNC: 20 MMOL/L (ref 22–29)
EGFRCR SERPLBLD CKD-EPI 2021: 37 ML/MIN/1.73M2
ERYTHROCYTE [DISTWIDTH] IN BLOOD BY AUTOMATED COUNT: 16.5 % (ref 10–15)
GLUCOSE SERPL-MCNC: 123 MG/DL (ref 70–99)
HCT VFR BLD AUTO: 24.2 % (ref 40–53)
HGB BLD-MCNC: 7.8 G/DL (ref 13.3–17.7)
MAGNESIUM SERPL-MCNC: 2 MG/DL (ref 1.7–2.3)
MCH RBC QN AUTO: 30.7 PG (ref 26.5–33)
MCHC RBC AUTO-ENTMCNC: 32.2 G/DL (ref 31.5–36.5)
MCV RBC AUTO: 95 FL (ref 78–100)
PHOSPHATE SERPL-MCNC: 5.1 MG/DL (ref 2.5–4.5)
PLATELET # BLD AUTO: 147 10E3/UL (ref 150–450)
POTASSIUM SERPL-SCNC: 4.8 MMOL/L (ref 3.4–5.3)
RBC # BLD AUTO: 2.54 10E6/UL (ref 4.4–5.9)
SODIUM SERPL-SCNC: 134 MMOL/L (ref 135–145)
UFH PPP CHRO-ACNC: 0.22 IU/ML
UFH PPP CHRO-ACNC: 0.42 IU/ML
UFH PPP CHRO-ACNC: 0.45 IU/ML
WBC # BLD AUTO: 7.1 10E3/UL (ref 4–11)

## 2023-11-09 PROCEDURE — 80048 BASIC METABOLIC PNL TOTAL CA: CPT | Performed by: NURSE PRACTITIONER

## 2023-11-09 PROCEDURE — 84100 ASSAY OF PHOSPHORUS: CPT | Performed by: NURSE PRACTITIONER

## 2023-11-09 PROCEDURE — 85520 HEPARIN ASSAY: CPT | Performed by: SURGERY

## 2023-11-09 PROCEDURE — 85027 COMPLETE CBC AUTOMATED: CPT | Performed by: NURSE PRACTITIONER

## 2023-11-09 PROCEDURE — 36415 COLL VENOUS BLD VENIPUNCTURE: CPT | Performed by: NURSE PRACTITIONER

## 2023-11-09 PROCEDURE — 120N000003 HC R&B IMCU UMMC

## 2023-11-09 PROCEDURE — 250N000013 HC RX MED GY IP 250 OP 250 PS 637: Performed by: STUDENT IN AN ORGANIZED HEALTH CARE EDUCATION/TRAINING PROGRAM

## 2023-11-09 PROCEDURE — 97530 THERAPEUTIC ACTIVITIES: CPT | Mod: GP

## 2023-11-09 PROCEDURE — 97110 THERAPEUTIC EXERCISES: CPT | Mod: GP

## 2023-11-09 PROCEDURE — 250N000011 HC RX IP 250 OP 636: Mod: JZ | Performed by: STUDENT IN AN ORGANIZED HEALTH CARE EDUCATION/TRAINING PROGRAM

## 2023-11-09 PROCEDURE — 85520 HEPARIN ASSAY: CPT | Performed by: STUDENT IN AN ORGANIZED HEALTH CARE EDUCATION/TRAINING PROGRAM

## 2023-11-09 PROCEDURE — 99024 POSTOP FOLLOW-UP VISIT: CPT | Performed by: NURSE PRACTITIONER

## 2023-11-09 PROCEDURE — 250N000013 HC RX MED GY IP 250 OP 250 PS 637: Performed by: NURSE PRACTITIONER

## 2023-11-09 PROCEDURE — 83735 ASSAY OF MAGNESIUM: CPT

## 2023-11-09 PROCEDURE — 36415 COLL VENOUS BLD VENIPUNCTURE: CPT | Performed by: STUDENT IN AN ORGANIZED HEALTH CARE EDUCATION/TRAINING PROGRAM

## 2023-11-09 PROCEDURE — 250N000013 HC RX MED GY IP 250 OP 250 PS 637

## 2023-11-09 PROCEDURE — 250N000013 HC RX MED GY IP 250 OP 250 PS 637: Performed by: SURGERY

## 2023-11-09 PROCEDURE — 85520 HEPARIN ASSAY: CPT | Performed by: NURSE PRACTITIONER

## 2023-11-09 PROCEDURE — 250N000011 HC RX IP 250 OP 636: Mod: JZ | Performed by: NURSE PRACTITIONER

## 2023-11-09 RX ORDER — VITAMIN B COMPLEX
50 TABLET ORAL DAILY
Status: DISCONTINUED | OUTPATIENT
Start: 2023-11-09 | End: 2023-11-17 | Stop reason: HOSPADM

## 2023-11-09 RX ADMIN — LOPERAMIDE HYDROCHLORIDE 4 MG: 2 CAPSULE ORAL at 20:38

## 2023-11-09 RX ADMIN — PSYLLIUM HUSK 1 PACKET: 3.4 POWDER ORAL at 08:10

## 2023-11-09 RX ADMIN — Medication 50 MCG: at 12:51

## 2023-11-09 RX ADMIN — PANCRELIPASE 2 CAPSULE: 24000; 76000; 120000 CAPSULE, DELAYED RELEASE PELLETS ORAL at 08:02

## 2023-11-09 RX ADMIN — QUETIAPINE FUMARATE 100 MG: 100 TABLET ORAL at 21:58

## 2023-11-09 RX ADMIN — TRIAMCINOLONE ACETONIDE: 1 OINTMENT TOPICAL at 08:11

## 2023-11-09 RX ADMIN — HEPARIN SODIUM 1400 UNITS/HR: 10000 INJECTION, SOLUTION INTRAVENOUS at 03:18

## 2023-11-09 RX ADMIN — Medication 60 ML: at 12:57

## 2023-11-09 RX ADMIN — QUETIAPINE FUMARATE 50 MG: 50 TABLET ORAL at 08:02

## 2023-11-09 RX ADMIN — PANCRELIPASE 2 CAPSULE: 24000; 76000; 120000 CAPSULE, DELAYED RELEASE PELLETS ORAL at 12:52

## 2023-11-09 RX ADMIN — ACETAMINOPHEN 975 MG: 325 TABLET, FILM COATED ORAL at 06:50

## 2023-11-09 RX ADMIN — HYDROCORTISONE: 25 OINTMENT TOPICAL at 08:11

## 2023-11-09 RX ADMIN — LOPERAMIDE HYDROCHLORIDE 4 MG: 2 CAPSULE ORAL at 15:56

## 2023-11-09 RX ADMIN — TRIAMCINOLONE ACETONIDE: 1 OINTMENT TOPICAL at 20:37

## 2023-11-09 RX ADMIN — HEPARIN SODIUM 1550 UNITS/HR: 10000 INJECTION, SOLUTION INTRAVENOUS at 15:55

## 2023-11-09 RX ADMIN — ATORVASTATIN CALCIUM 10 MG: 10 TABLET, FILM COATED ORAL at 20:38

## 2023-11-09 RX ADMIN — METOPROLOL TARTRATE 100 MG: 50 TABLET, FILM COATED ORAL at 20:39

## 2023-11-09 RX ADMIN — ACETAMINOPHEN 975 MG: 325 TABLET, FILM COATED ORAL at 18:05

## 2023-11-09 RX ADMIN — HYDROCORTISONE: 25 OINTMENT TOPICAL at 20:37

## 2023-11-09 RX ADMIN — AMLODIPINE BESYLATE 10 MG: 10 TABLET ORAL at 08:04

## 2023-11-09 RX ADMIN — PANCRELIPASE 2 CAPSULE: 24000; 76000; 120000 CAPSULE, DELAYED RELEASE PELLETS ORAL at 18:13

## 2023-11-09 RX ADMIN — ACETAMINOPHEN 975 MG: 325 TABLET, FILM COATED ORAL at 12:51

## 2023-11-09 RX ADMIN — HYDROXYZINE HYDROCHLORIDE 25 MG: 25 TABLET ORAL at 20:39

## 2023-11-09 RX ADMIN — METOPROLOL TARTRATE 100 MG: 50 TABLET, FILM COATED ORAL at 08:03

## 2023-11-09 RX ADMIN — Medication 40 MG: at 08:02

## 2023-11-09 RX ADMIN — QUETIAPINE FUMARATE 50 MG: 50 TABLET ORAL at 20:39

## 2023-11-09 RX ADMIN — ONDANSETRON 4 MG: 2 INJECTION INTRAMUSCULAR; INTRAVENOUS at 11:47

## 2023-11-09 RX ADMIN — LOPERAMIDE HYDROCHLORIDE 4 MG: 2 CAPSULE ORAL at 08:02

## 2023-11-09 RX ADMIN — Medication 1 MG: at 18:05

## 2023-11-09 ASSESSMENT — ACTIVITIES OF DAILY LIVING (ADL)
ADLS_ACUITY_SCORE: 42

## 2023-11-09 NOTE — PLAN OF CARE
Assumed Care: [id number]  Reason for Admission: Infrarenal abdominal aortic aneurysm (AAA) without rupture  Procedures:   [id number] 9/24/23 Resection of Ruptureed Abdominal Aneurysm, Aortic biliary bypass with 20 x 10 mm Bifurcated hemagard graft, Temporary Abdominal Closure, Endovascular Balloon Inclusion of Aorta Kandace-Harsha, Boris C [facility] OR Comp   [id number] 9/24/23 REPAIR, ANEURYSM ABDOMINAL AORTA, EMERGENT, OPEN Kandace-Harsha, Boris C [facility] OR Not [facility]   [id number] 9/25/23 Sigmoidoscopy flexible Gabino Walker MD [facility] GI Comp   [id number] 9/25/23 abdominal washout, combined, repacking,  abthera placement Ash Boucher MBBS [facility] OR Comp   [id number] 9/26/23 Abdominal washout, Retroperitoneal closure, washout left lower extremity wound Kandace-Idalou, Boris C [facility] OR Comp   [id number] 9/29/23 exploration of  ABDOMINAL CAVITY, placement of abthera Kandace-Harsha, Boris C [facility] OR Comp   [id number] 10/2/23 Exploratory laparotomy, abominal closure, wound vac change left lower extremity Jonathan Fry MD [facility] OR Comp   [id number] 10/17/23 AMPUTATION, ABOVE KNEE and Abdominal wound vac exchange Ash Boucher MBBS [facility] OR Comp   [id number] 10/20/23 Delayed primary subcutaneous abdominal closure Kandace-Harsha, Boris C [facility] OR Comp   IV Access/Incisions/Drains/Wounds:   Peripheral IV 11/03/23 Right;Lateral Upper forearm (Active)   Site Assessment WDL 11/09/23 0400   Line Status Infusing 11/09/23 0400   Dressing Transparent 11/09/23 0400   Dressing Status clean;dry;intact 11/09/23 0000   Dressing Intervention New dressing  11/09/23 0400   Line Intervention Flushed 11/08/23 2200   Phlebitis Scale 0-->no symptoms 11/09/23 0400   Infiltration? no 11/09/23 0400   Number of days: 6       Peripheral IV 11/05/23 Anterior;Left Lower forearm (Active)   Site Assessment WDL 11/09/23 0400   Line Status Saline locked 11/09/23 0400   Dressing Transparent 11/09/23 0400   Dressing Status clean;dry;intact 11/09/23 0000   Line Intervention Flushed 11/06/23 0818    Phlebitis Scale 0-->no symptoms 11/09/23 0400   Infiltration? no 11/09/23 0400   Number of days: 4       Negative Pressure Wound Therapy Thigh Anterior;Distal;Left (Active)   Wound Type Surgical 11/08/23 2130   Unit Type VAC Ultra 11/08/23 2130   Cycle Continuous 11/08/23 2130   Target Pressure (mmHg) 125 11/08/23 2130   Drainage Color/Characteristics Serosanguineous 11/08/23 2130   [place] changed? No 11/08/23 2130   Output (ml) 275 ml 11/08/23 2130   Number of days: 17       NG/OG/NJ Tube Nasoduodenal 10 fr Left nostril (Active)   Site Description WDL 11/08/23 2130   Status Enteral Feedings 11/09/23 0400   Drainage Appearance WDL 11/08/23 2130   Placement Confirmation [place] unchanged 11/09/23 0400   [place] (cm marking) at nare/mouth 93 cm 11/08/23 2130   Intake (ml) 50 ml 11/08/23 2130   Flush/Free Water (mL) 20 mL 11/08/23 1900   Number of days: 16       Replogle Tube Nasogastric (Active)   Site Description WDL 10/21/23 0011   Status Suction-low intermittent 10/21/23 0011   Drainage Appearance Bile 10/21/23 0011   Output (ml) 800 ml 10/21/23 0011   Number of days: 19       Wound Arm Skin tear;Abrasion(s) (Active)   Wound Bed Other (Comment) 11/08/23 2130   April-wound Assessment Erythema 11/08/23 0019   Estimated Circumference ( if not measured dime sized 11/03/23 1107   Drainage Amount Scant 11/08/23 1200   Drainage Color/Characteristics Sanguinous 11/08/23 1200   Wound Care/Cleansing Normal saline 11/08/23 1200   Dressing Dry gauze 11/08/23 1600   Dressing Status Clean, dry, intact 11/08/23 1600   Dressing Change Due 11/04/23 11/03/23 1107   Number of days: 7       Incision/Surgical Site 10/20/23 Abdomen (Active)   Incision Assessment UTV 11/09/23 0400   April-Incision Assessment UTV 11/08/23 2130   Closure WENDI 11/09/23 0400   Incision Drainage Amount None 11/09/23 0400   Drainage Description Serous 11/02/23 1520   Incision Care Other (Comment) 11/08/23 1200   Dressing Intervention Clean, dry, intact  "11/09/23 0400   Number of days: 20   IVF: Heparin 1550 units/hr  VS: /86   Pulse 97   Temp 98.3  F (36.8  C) (Oral)   Resp 15   Ht 1.727 m (5' 8\")   Wt 70.4 kg (155 lb 3.3 oz)   SpO2 97%   BMI 23.60 kg/m    Diet: Soft & Bite Sized Diet (level 6) Liquidized/Moderately Thick (level 3)  Adult Formula Drip Feeding: Specified Time Vital 1.5; Nasoduodenal tube; Goal Rate: 55 mL/hr x 16 hours/evening (2887-3806) - Please attach 2 Relizorb cartriges in tandem configuration for night feeds (discard and re-attach new cartridges at start---Calorie Counts starting 11/8  Activity: Lift  Pain Management: Rates Abd pain 5/10; Scheduled Tylenol given  Respiratory: RA sating in mid 90's  GI/: Voiding spontaneously with a urinal at bedside, +BM (last BM 11/8 afternoon), +Flatus, -Nausea  Neuro: A&Ox4  Cardiac: Sinus Rhythm/Sinus Tachy <110's  Team: Vascular Surgery  Pertinent Labs/Imaging: Anti Xa 2.2  Shift Updates: Patient slept well overnight. Patient woke up slightly disoriented to situation   Plan: [facility] when bed is available.                         "

## 2023-11-09 NOTE — PROGRESS NOTES
Vascular Surgery Progress Note  11/09/2023       Subjective:  No IV labetalol or hydralazine since 11/6. No BM overnight (now imodium scheduled). Pain appropriately managed with tylenol. Creatinine continues to down trend to 1.93 this morning. Poor appetite despite having multiple discussions with family and patient about eating on schedule and choosing foods patient likes. OK to bring food from home. VAC output is not 300ml in 24 hrs, patient's VAC cannister with total 275ml output present - since cannister exchanged. Metoprolol up to 100mg BID with normotension and tachycardia now resolved.     Patient is legally blind, requires one-to-one observation for food intake. This morning, during breakfast patient found to have open tray on table, food cold, without one-to-one supervision for intake. Had a long discussion with nutritionist, patient requires frequent redirection and encouragement to eat, clear his throat and supervision for potential aspiration given dysphagia. He has had a complicated course requiring multiple returns to OR, multiple reintubations and ENT intervention for vocal folds. Patient does not have a great appetite thus requiring frequent gentle encouragement to increase his intake. This was discussed with nursing and nutritionist, plan to have one-to-one supervision for meal intake and frequent encouragement as this is the only barrier for his discharge to rehab at this time.    Objective:  Temp:  [97.7  F (36.5  C)-98.6  F (37  C)] 98.3  F (36.8  C)  Pulse:  [82-97] 97  Resp:  [14-21] 15  BP: (126-159)/(76-96) 133/86  SpO2:  [95 %-99 %] 97 %    I/O last 3 completed shifts:  In: 1669.01 [I.V.:434.01; NG/GT:410]  Out: 1950 [Urine:1675; Drains:275]      Gen: resting comfortably in bed, answering questions appropriately, no delirium/confusion, whispering words, not in acute distress  CV: tachycardic per tele with PVCs  Resp: non-labored, on room air   Abd: Abdominal incision c/d/I. No active  bleeding, abdomen soft, non-tender to light palpation.  Ext: RLE wwp, palpable DP pulse, LLE stump incision with continued improved surrounding erythema similar to erythema of entire body, no obvious bleeding, soft, incisional prevena holding seal.    Prevena LLE placed on 11/06/23 - to be replaced in 5-7 days  Skin: Entire body with blanching erythema covering trunk arms, and legs significantly improved.     Labs:  Recent Labs   Lab 11/09/23  0409 11/08/23  0509 11/07/23  0545   WBC 7.1 6.8 7.1   HGB 7.8* 7.6* 7.7*   * 143* 156       Recent Labs   Lab 11/09/23  0409 11/08/23  0509 11/07/23  0545   * 136 138   POTASSIUM 4.8 4.8 4.8   CHLORIDE 102 104 106   CO2 20* 20* 20*   BUN 56.4* 58.8* 63.5*   CR 1.93* 1.98* 2.05*   * 119* 127*   OMAR 9.1 9.1 9.3   MAG 2.0 2.0 2.1   PHOS 5.1* 4.9* 5.0*      Assessment/Plan:   Alfredo Burnham is a 68 year old male with PMH of HTN and previous TIA who presented with R sided abdominal pain found to have contained ruptured AAA, now s/p open AAA repair 9/24 into 9/25am with 30 min supraceliac clamp time, prolonged inter-renal clamp time, temporary abdominal closure. He did have bloody stool postop with flex sig 9/25 demonstrating ischemic mucosal changes of the rectum, repeated abdominal without evidence of transmural necrosis of the small or large intestine, or the rectum. On 9/29 he was started on CRRT and subsequently iHD. S/p closure of abdominal fascia and subcutaneous tissue wound VAC applied 10/2/23. 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 On 10/20/23 underwent abdominal incision closure.     10/24/23 found to have hgb 5.8, CTA demonstrated left psoas muscle hematoma now size 6 cm x 3 cm x 3 cm, barely seen on CTA from 10/13. Lesser sac of the peritoneal cavity collection stable, hematoma in left iliac stable, no active extravasation. Hep gtt held.     Overnight 10/24-10/25 patient more lethargic with fevers at 102.1 thought to be strokelike symptoms, stroke  code called, workup negative. Hemoglobin dropped again to 5.8, despite holding heparin for 24 hours. Repeat CTA: demonstrated hematoma in LLE stump, with otherwise stable left iliac hematoma and stable left psoas muscle hematoma. Now resoled: patient's hgb stable, he is progressing well with continued improvements. Off iHD with renal recovery, tunneled line removed and cleared by SLP for diet.     Neuro:   - Seroquel at bedtime and as needed for anxiety/delirium episodes  - Previously had stroke code called for unequal pupils, c/f facial droop 10/6: workup revealed: Numerous, punctate late hyperacute to acute infarctions thought to be embolic in nature. No embolic source identified in workup.  - Follow-up with neurology 4-6 weeks after discharge, ok with a/c.     HEENT:   - ENT consult for ongoing dysphonia/dysphagia: endoscopy with L vocal cord paralysis. S/p bedside vocal fold injection 11/1/23 with otolaryngology.    CV:   - Amlodipine 10mg daily (at home on amlodipine 5mg and benazepril 20mg) due to SBP>160 requiring Labetalol IV. No benzepril for now. Added Metoprolol, up to 100mg PO tolerating well.    - Goal SBP <160, MAP >65  - Labetalol prn for SBP >160  - Continue statin  - PE+, venous duplex with nonocclusive to occlusive thrombus of the left GSV from the level of the knee to the groin and nonocclusive thrombus of the left distal femoral vein and popliteal veins, increased in extent compared to 9/30/2023.   - Heparin drip low intensity, with therapeutic anti-Xa, no plan to change to high intensity, appropriately anticoagulated. Will transition to DOAC the day before discharge, will remain on heparin drip until the day before transfer to rehab.    Resp:   - On room air, CXR clear    GI:   - Concern for Pancreatitis with peripancreatic fluid collection on CT 10/13 with question of bleeding into the collection. GI signed off as collection not drainable. - Improved appearance on repeat CT 10/25. Monitoring.     - TF tolerating via NJT, nocturnal feeds: addition of Relizorb per pt showing signs of pancreatic insufficiency and changing the TF formula  - Nutrition following, appreciate recommendations, on calorie count: Monitor ability for pt to consume at least ~1250 Kcals and ~65 gm protein before considering removal of FT (65% est needs)   - When stool consistency firms, will order fecal elastase levels to officially check for pancreatic insufficiency. Holding off on this at present, as watery stool sample will result in falsely diluted levels (less accurate).  - Cleared by SLP For IDDSI level 6 soft and bite sized diet with level 3 moderately thickened liquids (NO straws, 1:1 observation, patient is blind, must be sitting upright, alert).  - Change abd dressing daily  - Loose stools -- added psyllium daily and imodium 3x/day. C. difficile negative.    FEN:   - Electrolyte replacement prn     Renal:   - Cr downtrending 2.05  - Last iHD 10/27. Lasix challenge 10/28 with great response. Continues to make good amount of urine.   - Tunneled line removed with IR 11/2/23, nephrology signed off    Heme:   - Hgb 7.7  - DVT as above, PE   - Heparin drip low intensity, with therapeutic anti-Xa. No plan to transition to high intensity, appropriately anticoagulated. Will transition to DOAC the day prior to discharge.    ID:   -No further fevers, no leukocytosis. Patient has allergy to cefepime (hives). Continue to monitor for fevers, leukocytosis.  - monitor signs of pneumonia  - nystatin swish for oral candida    MSK:   - L AKA 10/17/23: prevena applied 10/30/23, to stay on for 5 to 7 days  - ACE wrap on L AKA at all times  - Orthotics provided stump protector and  11/1/23    Derm:   - Rash significantly improved, not cellulitis. Pharm reviewed meds, all low risk, derm consulted, ID has seen as well, all felt were likely drug induced presumably from cefepime  - Did receive 1 dose cefepime 10/25 - this may be cause of  worsening. May take 1-2 weeks to clear up.  - Appreciate dermatology recs, likely morbilliform eruption vs DRESS (low risk)    - triamcinolone 0.1% changed back to BID scheduled     Misc:   - Pediatric/Small tubes for blood draws (spoke with lab, included this request in daily lab draws)  - Appreciate aggressive PT/OT ROM, patient severely deconditioned and requires extensive physical therapy: Okay with lifting the equivalent of a gallon of water. Restrict utilizing upper extremities for lifting himself, transfers with upper extremities to chair, etc until 11/16/23 after which can be liberalized.  - Abd binder on at all times.  - Please keep LLE AKA wrapped with ACE.  - IMC status.  - Anticipate will require ARU at discharge (SW engaged) by the end of the week. ARU vs TCU; PMR to guide on which would patient benefit most of.         Discussed patient with Dr. Lowe, vascular surgery staff.    Miryam Carrasco, SACHIN  Vascular Surgery  Pager: [phone]  [email or web address]  Send message or 10 digit call back number Securely via Verical with the Verical Web Console (learn more here)

## 2023-11-09 NOTE — PROGRESS NOTES
CLINICAL NUTRITION SERVICES - BRIEF NOTE     Nutrition Prescription    RECOMMENDATIONS FOR MDs/PROVIDERS TO ORDER:  Monitor ability for pt to consume at least ~1250 Kcals and ~65 gm protein before considering removal of FT (65% est needs)      Recommend planning for long-term enteral access if not progressing with PO diet/intake     Recommendations already ordered by Registered Dietitian (RD):  Extending calorie count order through weekend (new end date 11/13)     Continue room service with assistance services to ensure pt receives meals TID  - Please encourage PO intake, small frequent meals, high protein foods with each meal/snack.   - Please strongly encourage patient to order full meal trays; if doesn't order sides, please automatically send a variety to offer for patent.     1:1 feeding assistance required at meal times per SLP and per poor visual acuity. This is already in place, per charge RN report. Patient care order also in place for this.   - Please provide consistent encouragement for patient to take bites of food, sips of supplements. Has not been self-initiating PO intake well without encouragement.     Continue Creon 24 Pancreatic Enzymes with PO intake.   Creon 24 - 2 capsules with each meal + 1 capsule with snacks or supplements. Provides ~700 units lipase/kg body weight (per actual wt 68.5 kg)    BM frequency now reducing (pt was started on scheduled Imodium by provider, in addition to start of enzyme therapy. Will order fecal elastase levels to assess if pancreatic enzyme therapy warranted over long-term. Monitor for results.     Modified supplement order again per primary team request to be more aggressive with supplements.   - 10 AM and 2 PM please send chocolate flavor Ensure Max Protein (thicken per diet order)   - Breakfast and Dinner - Please rotate all flavors of Magic Cup   - Lunch - Please rotate all flavors of Gelatein   - Wife providing Boost supplements from home as well, which patient  prefers. Wife is aware that supplements need to be thickened prior to pt consumption.   **Allow any additional snack/supplement PRN if requested**      Vit D levels checked per potential fat malabsorption if pancreatic insufficiency present. Results indicate deficiency (18). Will start Vit D supplement, 2000 units/50 mcg daily. Note, CRP slightly elevated, which may lead to falsely low Vit D levels, so recommend rechecking levels in ~3-6 months.    Continue Cyclic/nocturnal TFs as main source of nutrition, given ongoing low PO intake:  Vital 1.5 abdiel (or equivalent) at rate of 55 mL/hr x 16 hours/evening to provide:   880 mL formula, 1320 Kcals (+ 100 Kcal Expedite), 60 gm protein (+ 10 gm Expedite), 165 gm CHO, 5 gm fiber, and 672 mL free water daily.   Attach Relizorb cartridge with TFs.   - Order Relizorb; attach 2 Relizorb cartridges in tandem configuration to run with the nocturnal/cyclic TFs. Discard cartridges after TF infusion. Do not re-use cartridges.   RELIZORB CARTRIDGES  *Supplies: Obtain cartridges by calling [phone] and provide peoplesoft #[phone]  - Please see patient care order for further detail.      Future/Additional Recommendations:  Continue to monitor nutrition related findings per protocol    For last full RD assessment, see note dated 11/7/23    NEW FINDINGS   - Patient was discussed with primary team SANDY this AM - concern for ongoing low PO intake. Concern reported by SANDY that patient had a tray at bedside with no RN/NA at bedside to assist with intake, pt requires 1:1 assistance per SLP and also due to poor visual acuity. Discussed with charge RN is aware of the 1:1 feeding assistance order.     - Patient currently receiving room service with assistance services. Only wanted eggs sent for breakfast this AM. Placed revised room service assistance order with instructions to encourage pt to order a full meal tray and to automatically send a few side items to patient if meal tray small.     -  Additionally, increased oral nutrition supplement offerings to provide a variety of supplements TID with meals and BID between meals (though of note, RD had decreased supplements earlier this week as patient does not accept them; if supplements start collecting up at bedside may not be appropriate). Sending a variety of types and flavors. Wife additionally providing Boost from home (is aware of need to thicken), pt accepts Boost better.     - Primary team provider started scheduled Imodium around same time as pancreatic enzyme therapy started. Stooling frequency reduced. Will order fecal elastase levels now to see if enzymes necessary.     - Vit D results back and low. CRP elevated, so potential for false depletion; rec re-checking Vit D levels in 3-6 months (ordering 2000 unit/day dose).     - Met with patient and wife at bedside to extensively discuss the above. Reviewed minimal Kcal goals, strategies to optimize/maximize intake, reason for aggressive supplement change, reason for new Vit D supplement, plan to check fecal elastase, and ongoing need for TFs (though also discussed tricky balance of TF/PO; if pt able to increase intake to meet a higher% of needs, will decrease TF rate but holding off for now given HIGH nutrition needs to support post-op wound healing). Pt/wife receptive to the above. Pt was more engaged with room service assistance staff today, so hopefully will see improvement.       INTERVENTIONS  Implementation  Collaboration with other providers - Primary team SANDY, charge RN  Enteral Nutrition - Rec continue given high nutrition needs for post-op wound healing (AKA) and poor PO  Medical food supplement therapy - Revise, more aggressive regimen per provider request   Modify composition of meals/snacks - Continue RS with assistance, send additional tray items if does not order full meal. Juvencio cts continue.   Multivitamin/mineral supplement therapy - Vit D to begin per deficiency   Nutrition  education for recommended modifications - Encourage PO intake, small frequent meals    Monitoring/Evaluation  Will continue to monitor and evaluate per protocol.    Donavan Cobb RDN, LD, CNSC  6B work-room RD phone: *[phone]   6B/Obs RD pager: [phone]    Weekend/Holiday RD pager [phone]

## 2023-11-09 NOTE — PROGRESS NOTES
Calorie Count  Intake recorded for: 11/8  Total Kcals: 145 Total Protein: 5g  Kcals from Hospital Food: 145  Protein: 5g  Kcals from Outside Food (average):0 Protein: 0g  # Meals Ordered from Kitchen: 2 meals   # Meals Recorded: 1 meal - 25% pears, tomato soup, less than 25% roast beef w/ gravy fried potatoes  # Supplements Recorded: 25% 1 Magic Cup

## 2023-11-10 ENCOUNTER — APPOINTMENT (OUTPATIENT)
Dept: OCCUPATIONAL THERAPY | Facility: CLINIC | Age: 68
DRG: 268 | End: 2023-11-10
Payer: COMMERCIAL

## 2023-11-10 ENCOUNTER — APPOINTMENT (OUTPATIENT)
Dept: SPEECH THERAPY | Facility: CLINIC | Age: 68
DRG: 268 | End: 2023-11-10
Payer: COMMERCIAL

## 2023-11-10 LAB
ANION GAP SERPL CALCULATED.3IONS-SCNC: 13 MMOL/L (ref 7–15)
BUN SERPL-MCNC: 60 MG/DL (ref 8–23)
CALCIUM SERPL-MCNC: 9 MG/DL (ref 8.8–10.2)
CHLORIDE SERPL-SCNC: 103 MMOL/L (ref 98–107)
CREAT SERPL-MCNC: 2.01 MG/DL (ref 0.67–1.17)
DEPRECATED HCO3 PLAS-SCNC: 20 MMOL/L (ref 22–29)
EGFRCR SERPLBLD CKD-EPI 2021: 35 ML/MIN/1.73M2
ERYTHROCYTE [DISTWIDTH] IN BLOOD BY AUTOMATED COUNT: 16.5 % (ref 10–15)
GLUCOSE SERPL-MCNC: 117 MG/DL (ref 70–99)
HCT VFR BLD AUTO: 23.8 % (ref 40–53)
HGB BLD-MCNC: 7.6 G/DL (ref 13.3–17.7)
MAGNESIUM SERPL-MCNC: 2.1 MG/DL (ref 1.7–2.3)
MCH RBC QN AUTO: 30.6 PG (ref 26.5–33)
MCHC RBC AUTO-ENTMCNC: 31.9 G/DL (ref 31.5–36.5)
MCV RBC AUTO: 96 FL (ref 78–100)
PHOSPHATE SERPL-MCNC: 5.3 MG/DL (ref 2.5–4.5)
PLATELET # BLD AUTO: 141 10E3/UL (ref 150–450)
POTASSIUM SERPL-SCNC: 5.1 MMOL/L (ref 3.4–5.3)
RBC # BLD AUTO: 2.48 10E6/UL (ref 4.4–5.9)
SODIUM SERPL-SCNC: 136 MMOL/L (ref 135–145)
UFH PPP CHRO-ACNC: 0.48 IU/ML
WBC # BLD AUTO: 6 10E3/UL (ref 4–11)

## 2023-11-10 PROCEDURE — 97530 THERAPEUTIC ACTIVITIES: CPT | Mod: GO

## 2023-11-10 PROCEDURE — 97110 THERAPEUTIC EXERCISES: CPT | Mod: GO

## 2023-11-10 PROCEDURE — 36415 COLL VENOUS BLD VENIPUNCTURE: CPT | Performed by: NURSE PRACTITIONER

## 2023-11-10 PROCEDURE — 99024 POSTOP FOLLOW-UP VISIT: CPT | Performed by: NURSE PRACTITIONER

## 2023-11-10 PROCEDURE — 250N000013 HC RX MED GY IP 250 OP 250 PS 637: Performed by: STUDENT IN AN ORGANIZED HEALTH CARE EDUCATION/TRAINING PROGRAM

## 2023-11-10 PROCEDURE — 120N000003 HC R&B IMCU UMMC

## 2023-11-10 PROCEDURE — 250N000013 HC RX MED GY IP 250 OP 250 PS 637: Performed by: SURGERY

## 2023-11-10 PROCEDURE — 250N000011 HC RX IP 250 OP 636: Mod: JZ | Performed by: NURSE PRACTITIONER

## 2023-11-10 PROCEDURE — 99232 SBSQ HOSP IP/OBS MODERATE 35: CPT | Mod: GC | Performed by: PHYSICAL MEDICINE & REHABILITATION

## 2023-11-10 PROCEDURE — 90834 PSYTX W PT 45 MINUTES: CPT | Performed by: STUDENT IN AN ORGANIZED HEALTH CARE EDUCATION/TRAINING PROGRAM

## 2023-11-10 PROCEDURE — 84100 ASSAY OF PHOSPHORUS: CPT | Performed by: NURSE PRACTITIONER

## 2023-11-10 PROCEDURE — 250N000013 HC RX MED GY IP 250 OP 250 PS 637

## 2023-11-10 PROCEDURE — 250N000011 HC RX IP 250 OP 636: Mod: JZ | Performed by: STUDENT IN AN ORGANIZED HEALTH CARE EDUCATION/TRAINING PROGRAM

## 2023-11-10 PROCEDURE — 92526 ORAL FUNCTION THERAPY: CPT | Mod: GN

## 2023-11-10 PROCEDURE — 250N000013 HC RX MED GY IP 250 OP 250 PS 637: Performed by: NURSE PRACTITIONER

## 2023-11-10 PROCEDURE — 85027 COMPLETE CBC AUTOMATED: CPT | Performed by: NURSE PRACTITIONER

## 2023-11-10 PROCEDURE — 85520 HEPARIN ASSAY: CPT | Performed by: SURGERY

## 2023-11-10 PROCEDURE — 80048 BASIC METABOLIC PNL TOTAL CA: CPT | Performed by: NURSE PRACTITIONER

## 2023-11-10 PROCEDURE — 83735 ASSAY OF MAGNESIUM: CPT

## 2023-11-10 RX ORDER — LOPERAMIDE HCL 2 MG
4 CAPSULE ORAL 4 TIMES DAILY PRN
Status: DISCONTINUED | OUTPATIENT
Start: 2023-11-10 | End: 2023-11-17 | Stop reason: HOSPADM

## 2023-11-10 RX ADMIN — QUETIAPINE FUMARATE 50 MG: 50 TABLET ORAL at 08:29

## 2023-11-10 RX ADMIN — PANCRELIPASE 2 CAPSULE: 24000; 76000; 120000 CAPSULE, DELAYED RELEASE PELLETS ORAL at 08:30

## 2023-11-10 RX ADMIN — PSYLLIUM HUSK 1 PACKET: 3.4 POWDER ORAL at 08:27

## 2023-11-10 RX ADMIN — ACETAMINOPHEN 975 MG: 325 TABLET, FILM COATED ORAL at 23:59

## 2023-11-10 RX ADMIN — TRIAMCINOLONE ACETONIDE: 1 OINTMENT TOPICAL at 20:55

## 2023-11-10 RX ADMIN — ACETAMINOPHEN 975 MG: 325 TABLET, FILM COATED ORAL at 00:27

## 2023-11-10 RX ADMIN — ONDANSETRON 4 MG: 2 INJECTION INTRAMUSCULAR; INTRAVENOUS at 17:47

## 2023-11-10 RX ADMIN — Medication 60 ML: at 08:31

## 2023-11-10 RX ADMIN — METOPROLOL TARTRATE 100 MG: 50 TABLET, FILM COATED ORAL at 20:54

## 2023-11-10 RX ADMIN — ATORVASTATIN CALCIUM 10 MG: 10 TABLET, FILM COATED ORAL at 20:54

## 2023-11-10 RX ADMIN — ACETAMINOPHEN 975 MG: 325 TABLET, FILM COATED ORAL at 05:46

## 2023-11-10 RX ADMIN — METOPROLOL TARTRATE 100 MG: 50 TABLET, FILM COATED ORAL at 08:29

## 2023-11-10 RX ADMIN — Medication 50 MCG: at 08:29

## 2023-11-10 RX ADMIN — Medication 1 MG: at 17:55

## 2023-11-10 RX ADMIN — PANCRELIPASE 2 CAPSULE: 24000; 76000; 120000 CAPSULE, DELAYED RELEASE PELLETS ORAL at 13:05

## 2023-11-10 RX ADMIN — HYDROCORTISONE: 25 OINTMENT TOPICAL at 08:29

## 2023-11-10 RX ADMIN — ACETAMINOPHEN 975 MG: 325 TABLET, FILM COATED ORAL at 13:05

## 2023-11-10 RX ADMIN — HEPARIN SODIUM 1550 UNITS/HR: 10000 INJECTION, SOLUTION INTRAVENOUS at 22:15

## 2023-11-10 RX ADMIN — AMLODIPINE BESYLATE 10 MG: 10 TABLET ORAL at 08:29

## 2023-11-10 RX ADMIN — QUETIAPINE FUMARATE 100 MG: 100 TABLET ORAL at 22:14

## 2023-11-10 RX ADMIN — HEPARIN SODIUM 1550 UNITS/HR: 10000 INJECTION, SOLUTION INTRAVENOUS at 08:17

## 2023-11-10 RX ADMIN — Medication 40 MG: at 08:30

## 2023-11-10 RX ADMIN — QUETIAPINE FUMARATE 50 MG: 50 TABLET ORAL at 20:54

## 2023-11-10 RX ADMIN — ACETAMINOPHEN 975 MG: 325 TABLET, FILM COATED ORAL at 17:55

## 2023-11-10 RX ADMIN — LOPERAMIDE HYDROCHLORIDE 4 MG: 2 CAPSULE ORAL at 08:29

## 2023-11-10 RX ADMIN — HYDROCORTISONE: 25 OINTMENT TOPICAL at 20:55

## 2023-11-10 RX ADMIN — TRIAMCINOLONE ACETONIDE: 1 OINTMENT TOPICAL at 08:29

## 2023-11-10 ASSESSMENT — ACTIVITIES OF DAILY LIVING (ADL)
ADLS_ACUITY_SCORE: 46
ADLS_ACUITY_SCORE: 47
ADLS_ACUITY_SCORE: 42
ADLS_ACUITY_SCORE: 46
ADLS_ACUITY_SCORE: 47

## 2023-11-10 NOTE — PLAN OF CARE
Goal Outcome Evaluation:           Neuro: A&Ox4. Forgetful  Cardiac: SR/ST. HR 100s. SBP 130s-140s. PRN labetalol and hydralazine available for SBP > 160.   Respiratory: Sating >93% on RA.  GI/: Adequate urine output via urinal. No BM this shift.   Diet/appetite: Tolerating soft and bite sized level 6 with thick level 3 for drinks. No straws. Poor appetite. Cycled TF from 4p-8a @ 55 mL/hr with 30mL FWF Q 4 hour via ND. Meds crushed and thru ND. Tolerated TF well.  Activity:  Assist of 1-2 in bed. 2 assist lift to or from chair. L AKA  Pain: At acceptable level on current regimen.   Skin: No new deficits noted.  LDA's: R/L 2 PIV, hep ggt @ 1550 unit/hr. Therapeutic. Next draw tmrw @ 4am. Wound vac to L AKA @ -125. ND.     Plan: Continue with POC. Notify primary team with changes.

## 2023-11-10 NOTE — PROGRESS NOTES
CLINICAL NUTRITION SERVICES - BRIEF NOTE     Nutrition Prescription    RECOMMENDATIONS FOR MDs/PROVIDERS TO ORDER:  Please modify Imodium order back to PRN only, as pt now without a BM x2 days and need to collect stool sample for fecal elastase test (to check if pancreatic enzymes warranted over long-term) - discussed with primary team NP.     Monitor ability for pt to consume at least ~1250 Kcals and ~65 gm protein before considering removal of FT (65% est needs)      Recommend planning for long-term enteral access if not progressing with PO diet/intake     Recommendations already ordered by Registered Dietitian (RD):  Calorie count order through weekend (last day of abdiel ct currently 11/13)      Continue room service with assistance services to ensure pt receives meals TID  - Please encourage PO intake, small frequent meals, high protein foods with each meal/snack.   - Please strongly encourage patient to order full meal trays; if doesn't order sides, please automatically send a variety to offer for patent.      1:1 feeding assistance required at meal times per SLP and per poor visual acuity (though able to feed self, ensure feeding needs met - more-so needs consistent encouragement).      Continue Creon 24 Pancreatic Enzymes with PO intake.   Creon 24 - 2 capsules with each meal + 1 capsule with snacks or supplements. Provides ~700 units lipase/kg body weight (per actual wt 68.5 kg) DO NOT GIVE VIA FT, PO ONLY (high risk for clogging FT) - added pt care order.      Fecal elastase levels ordered to assess if pancreatic enzyme therapy warranted over long-term. RN to collect stool sample once pt has BM. Asked primary team to make Imodium PRN only, as Imodium and Creon started near same time (and unclear which helped to reduce stooling)     Receives multiple oral nutrition supplements, per primary team request. See RD note from yesterday.    Reduce cyclic TFs given slight increase to PO intake (took 2 full supplements  yesterday)  Vital 1.5 abdiel (or equivalent) at rate of 55 mL/hr x 12 hours/evening + 1 Expedite wound healing modular via FT to provide:   660 mL formula, 990 Kcals (+ 100 Kcal Expedite), 45 gm protein (+ 10 gm Expedite), 123 gm CHO, 4 gm fiber, and 504 mL free water daily.   --> Meets ~55% avg est Kcal needs, and ~57% est avg protein needs     Attach Relizorb cartridge with TFs.   - Order Relizorb; attach 2 Relizorb cartridges in tandem configuration to run with the nocturnal/cyclic TFs. Discard cartridges after TF infusion. Do not re-use cartridges.   RELIZORB CARTRIDGES  *Supplies: Obtain cartridges by calling [phone] and provide peoplesoft #[phone]  - Please see patient care order for further detail.      Future/Additional Recommendations:  Continue to monitor nutrition related findings per protocol   - Monitor abdiel cts/ONS acceptance, PO intake trends   - Monitor for need to increase TF rate/schedule if not making progress; low threshold to do so, given high nutrition needs in post-op setting with recent AKA and surgeries    For last full RD assessment, see note dated 11/7/23, and brief RD note 11/9    NEW FINDINGS   - Pt still only taking bites of meals; however, was able to take 2 full oral nutrition supplements yesterday. Kcal counts yesterday providing 703 Kcals and 54 gm protein. Writer had discussed with pt yesterday that if he is able to increase his intake of ONS we can decrease his TF rate which will hopefully continue to stimulate his appetite. Given intake yesterday with supplements, it is reasonable to trial this over the weekend. D/W primary team NP and bedside RN. See new TF order and continue to encourage PO intake. Will discuss w/ pt; attempted to see x3 this AM and has been busy with providers but will continue to check in to discuss these changes.    INTERVENTIONS  Implementation  Collaboration with other providers - Vascular surgery NP, bedside RN   Enteral Nutrition - Modify schedule and  volume    Monitoring/Evaluation  Will continue to monitor and evaluate per protocol.    Donavan Cobb, IVET, LD, CNSC  6B work-room RD phone: *[phone]   6B/Obs RD pager: [phone]    Weekend/Holiday RD pager [phone]

## 2023-11-10 NOTE — PROGRESS NOTES
Vascular Surgery Progress Note  11/10/2023       Subjective:  Creatinine plateaued at 1.9-2.0, remains afebrile without leukocytosis stable hemoglobin and therapeutic heparin drip. Patient has not required intermittent IV labetalol or hydralazine since 11/6/2023. No BMs for 2 days, changing imodium to PRN.  Objective:  Temp:  [97.7  F (36.5  C)-99.1  F (37.3  C)] 98  F (36.7  C)  Pulse:  [] 100  Resp:  [13-21] 16  BP: (112-152)/(69-87) 129/81  SpO2:  [97 %-99 %] 97 %    I/O last 3 completed shifts:  In: 1345.17 [I.V.:315.17; NG/GT:260]  Out: 1605 [Urine:1600; Drains:5]      Gen: resting comfortably in bed, answering questions appropriately, no delirium/confusion, whispering words, not in acute distress  CV: tachycardic per tele with PVCs  Resp: non-labored, on room air   Abd: Abdominal incision c/d/I. No active bleeding, abdomen soft, non-tender to light palpation.  Ext: RLE wwp, palpable DP pulse, LLE stump incision with continued improved surrounding erythema similar to erythema of entire body, no obvious bleeding, soft, incisional prevena holding seal.    Prevena LLE placed on 11/06/23 - to be replaced in 5-7 days  Skin: Blanching erythema resolved.     Labs:  Recent Labs   Lab 11/10/23  0354 11/09/23  0409 11/08/23  0509   WBC 6.0 7.1 6.8   HGB 7.6* 7.8* 7.6*   * 147* 143*       Recent Labs   Lab 11/10/23  0354 11/09/23  0409 11/08/23  0509    134* 136   POTASSIUM 5.1 4.8 4.8   CHLORIDE 103 102 104   CO2 20* 20* 20*   BUN 60.0* 56.4* 58.8*   CR 2.01* 1.93* 1.98*   * 123* 119*   OMAR 9.0 9.1 9.1   MAG 2.1 2.0 2.0   PHOS 5.3* 5.1* 4.9*      Assessment/Plan:   Alfredo Burnham is a 68 year old male with PMH of HTN and previous TIA who presented with R sided abdominal pain found to have contained ruptured AAA, now s/p open AAA repair 9/24 into 9/25am with 30 min supraceliac clamp time, prolonged inter-renal clamp time, temporary abdominal closure. He did have bloody stool postop with  flex sig 9/25 demonstrating ischemic mucosal changes of the rectum, repeated abdominal without evidence of transmural necrosis of the small or large intestine, or the rectum. On 9/29 he was started on CRRT and subsequently iHD. S/p closure of abdominal fascia and subcutaneous tissue wound VAC applied 10/2/23. 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 On 10/20/23 underwent abdominal incision closure.     10/24/23 found to have hgb 5.8, CTA demonstrated left psoas muscle hematoma now size 6 cm x 3 cm x 3 cm, barely seen on CTA from 10/13. Lesser sac of the peritoneal cavity collection stable, hematoma in left iliac stable, no active extravasation. Hep gtt held.     Overnight 10/24-10/25 patient more lethargic with fevers at 102.1 thought to be strokelike symptoms, stroke code called, workup negative. Hemoglobin dropped again to 5.8, despite holding heparin for 24 hours. Repeat CTA: demonstrated hematoma in LLE stump, with otherwise stable left iliac hematoma and stable left psoas muscle hematoma. Now resoled: patient's hgb stable, he is progressing well with continued improvements. Off iHD with renal recovery, tunneled line removed and cleared by SLP for diet. With poor PO intake.     Neuro:   - Seroquel at bedtime and as needed for anxiety/delirium episodes  - Previously had stroke code called for unequal pupils, c/f facial droop 10/6: workup revealed: Numerous, punctate late hyperacute to acute infarctions thought to be embolic in nature. No embolic source identified in workup.  - Follow-up with neurology 4-6 weeks after discharge, ok with a/c.     HEENT:   - ENT consult for ongoing dysphonia/dysphagia: endoscopy with L vocal cord paralysis. S/p bedside vocal fold injection 11/1/23 with otolaryngology. Continues to have dysphonia/dysphagia. Will re-engage ENT Monday if no improvement.    CV:   - Amlodipine 10mg daily (at home on amlodipine 5mg and benazepril 20mg) due to SBP>160 requiring Labetalol IV. No benzepril for now.  Added Metoprolol, up to 100mg PO tolerating well.    - Goal SBP <160, MAP >65  - Labetalol prn for SBP >160  - Continue statin  - PE+, venous duplex with nonocclusive to occlusive thrombus of the left GSV from the level of the knee to the groin and nonocclusive thrombus of the left distal femoral vein and popliteal veins, increased in extent compared to 9/30/2023.   - Heparin drip low intensity, with therapeutic anti-Xa, no plan to change to high intensity, appropriately anticoagulated. Will transition to DOAC the day before discharge, will remain on heparin drip until the day before transfer to rehab.    Resp:   - On room air, CXR clear    GI:   - Concern for Pancreatitis with peripancreatic fluid collection on CT 10/13 with question of bleeding into the collection. GI signed off as collection not drainable. - Improved appearance on repeat CT 10/25. Monitoring.    - TF tolerating via NJT, nocturnal feeds: addition of Relizorb per pt showing signs of pancreatic insufficiency and changing the TF formula  - Nutrition following, appreciate recommendations, on calorie count: Monitor ability for pt to consume at least ~1250 Kcals and ~65 gm protein before considering removal of FT (65% est needs)   - When stool consistency firms, will order fecal elastase levels to officially check for pancreatic insufficiency. Holding off on this at present, as watery stool sample will result in falsely diluted levels (less accurate).  - Cleared by SLP For IDDSI level 6 soft and bite sized diet with level 3 moderately thickened liquids (NO straws, 1:1 observation, patient is blind, must be sitting upright, alert). FEES ordered for Monday due to silent aspirations and continued dysphonia/dysphagia despite ENT injection of vocal folds.   - Change abd dressing daily  - Loose stools -- added psyllium daily and imodium 4x/day as needed. C. difficile negative.    FEN:   - Electrolyte replacement prn     Renal:   - Cr plateau at 1.9-2  Continues to make good amount of urine.   - Last iHD 10/27.  - Tunneled line removed with IR 11/2/23, nephrology signed off    Heme:   - Hgb 7.7  - DVT as above, PE   - Heparin drip low intensity, with therapeutic anti-Xa. No plan to transition to high intensity, appropriately anticoagulated. Will transition to DOAC the day prior to discharge.    ID:   -No further fevers, no leukocytosis. Patient has allergy to cefepime (hives). Continue to monitor for fevers, leukocytosis.  - monitor signs of pneumonia  - nystatin swish for oral candida    MSK:   - L AKA 10/17/23: prevena applied 10/30/23, to stay on for 5 to 7 days  - ACE wrap on L AKA at all times  - Orthotics provided stump protector and  11/1/23    Derm:   - Rash significantly improved, not cellulitis. Pharm reviewed meds, all low risk, derm consulted, ID has seen as well, all felt were likely drug induced presumably from cefepime  - Did receive 1 dose cefepime 10/25 - this may be cause of worsening. May take 1-2 weeks to clear up.  - Appreciate dermatology recs, likely morbilliform eruption vs DRESS (low risk)    - triamcinolone 0.1% changed back to BID scheduled     Misc:   - Pediatric/Small tubes for blood draws (spoke with lab, included this request in daily lab draws)  - Appreciate aggressive PT/OT ROM, patient severely deconditioned and requires extensive physical therapy: Okay with lifting the equivalent of a gallon of water. Restrict utilizing upper extremities for lifting himself, transfers with upper extremities to chair, etc until 11/16/23 after which can be liberalized.  - Abd binder on at all times.  - Please keep LLE AKA wrapped with ACE.  - IMC status.  - Anticipate will require ARU at discharge (SW engaged). PMR recommends ARU, potentially next week.        Discussed patient with Dr. Lowe, vascular surgery staff.    Miryam Carrasco, SACHIN  Vascular Surgery  Pager: [phone]  [email or web address]  Send message or 10 digit call  back number Securely via [employer] with the [employer] Web Console (learn more here)

## 2023-11-10 NOTE — PROGRESS NOTES
Calorie Count  Intake recorded for: 11/9  Total Kcals: 703 Total Protein: 54g  Kcals from Hospital Food: 343  Protein: 40g  Kcals from Outside Food (average):360 Protein: 14g  # Meals Ordered from Kitchen: 3 meals   # Meals Recorded: 2 meals (First - 25% scrambled eggs)       (Second - 50% side mac & cheese, sausage w/ gravy)  # Supplements Recorded: 100% 1 Ensure Max Protein; 1 Boost Plus from home

## 2023-11-10 NOTE — PLAN OF CARE
Neuro: A&Ox4.  Follow command can be anxious at times.   Cardiac: SR. VSS. BP <160 SBP during     Respiratory: Sating >95% on RA.   GI/: Adequate urine output. BM X 1 loose stool   Diet/appetite: Tolerating soft sized + bite sized level 6 moderate thick level 3 .Cycle TF  TF 1600 pm -8am @ goal Rate 55 ml/hr w/ 30 ml FWF. Med crush via ND Tube. Patient tolerated well.   Activity:  Lift A2. L AKA.   Pain: At acceptable level on current regimen.   Skin: No new deficits noted.  LDA's: wound vac 125 mm hg. 2 RT PIV hep infusing 1550 ml/hr.  next xa 1550 unit/hr

## 2023-11-10 NOTE — PROGRESS NOTES
[facility]     PM&R PROGRESS NOTE      ASSESSMENT/PLAN:    Mr. Alfredo Burnham is a 68 year old male with a history of legal blindness (macular degeneration), hypertension, TIA who initially presented to the [place] ED on 9/24/2023 with severe worsening abdominal pain, found to have ruptured infrarenal renal AAA with associated LLE ischemia now s/p AKA (10/17/2023).  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      Physical Medicine and Rehabilitation have been consulted to evaluate patient for rehabilitation needs/discharge planning.     Recommendations:   The best place for the patient's disposition is a very complex scenario in the setting of significant deconditioning associated with multiple surgeries, prolonged hospital course, and multiple complications with an additional layer of complexity added from his pre-existing visual impairment and recent fatigue associated with new diarrhea.  As he has a new AKA and has needs from PT/OT and SLP, acute rehab is the ideal location for him, however in his current state I am not confident he would tolerate the 3 hours of therapy due to his fatigue. Based on his limitations (and his recognition of these) at this exact time point TCU is likely a better option.  Both he and his spouse feel that he was doing better prior to the diarrhea onset and this is supported on discussion with physical therapy team today.  If there is some resolution of his diarrhea, there could possibly be some improved tolerance of therapies.  As there still seems to be some medical changes needed prior to discharge to a rehab setting, our team will be able to better assess his trajectory  and anticipated tolerance following a subsequent visit    Potential barriers to rehab admission:  -Recommend transition from IV to PO prn medications for BP management  -Recommend discontinuing heparin drip in factor of DOAC prior to discharge to rehab     Update 11/10/23:   With improvement of diarrhea and liberalization of upper extremity restrictions, it seems his tolerance for therapies is trending upwards. Patient and family remain motivated to pursue aggressive rehab cares and have reasonable goals (improvement of strength/conditioning, improved transfers, increased independence at a  level in their  accessible home, decrease overall caregiver burden) for ultimate discharge home with further outpatient therapy and prosthetic cares.      When medically stable, would recommend Acute Rehab Unit for further inpatient rehab.     Patient was discussed with attending physician, Dr. Brooks.     Colin Chavarria DO  PM&R Resident  [facility]    INTERVAL HISTORY:  Alfredo Burnham was seen and examined at bedside. His wife and daughter also present. Doing well participating in rehab plan of care. They report diarrhea much improved. Still having some nausea in recent days but improved thus far today. Pain well controlled. Trying to eat, difficult with vision. Appetite is poor though. He would like to have more aggressive therapies and felt encouraged with the upper extremity restrictions having been removed. They endorse primary goals of improving ability and safety with transfers so he can return home at a likely wheelchair level with ultimately hoping for prosthetic use.     Functionally:   - Per PT, on 11/9 completed 29 minute session. He was supine to sit with mod assist at the trunk. Max assist to EOB. Mod assist for placement of sideboard and mod assist for lateral slideboard transfer to . Too fatigued to trial again, required OH lift and total assist back to bed. On 11/8 completed 27  "minute session, \"stood\" for 75 seconds on moveo. Max assist do don L AKA limb protector. Had UE restrictions so unable to work on slide board transfers    - Per OT on 11/7, completed 8 minute session , completing UE exercises in recliner to increase strength for ADLs. CV for technique, tolerated session.     - Per SLP, on 11/8, IDDSI 6/4 diet with 1:1 supervision.     Medically:   - No IV anti hypertensives since 11/6  - Up titration of metoprolol for BP  - Continues on heparin gtt, plan for transition to DOAC prior to discharge   - Creatinine downtrending   - Improved loose stools with scheduled imodium  - Ongoing difficult with PO intake secondary to vision impairments, appetite and needs for 1:1     MEDICATIONS:  No current outpatient medications on file.       EXAMINATION:  /81 (BP Location: Right arm)   Pulse 100   Temp 98  F (36.7  C) (Oral)   Resp 16   Ht 1.727 m (5' 8\")   Wt 70.1 kg (154 lb 8.7 oz)   SpO2 97%   BMI 23.50 kg/m      General: NAD, pleasant and cooperative   HEENT: ATNC, no discharge from nares or ears. NG tube in place  Pulmonary: breathing comfortably on room air, no accessory muscle use   Cardiovascular: RRR, radial pulses 2+ b/l  Abdominal: soft, non-tender to palpation. Abdominal binder in place with bandage over midline incision.   Extremities: Warm and well perfused in bilateral upper and lower extremities. L AKA bulbous in shape without ACE in place and Prevena at suction with serosanguinous fluid in canister  Skin: Desquamation on hands, diffuse blanching erythema throughout  MSK/neuro:              Mental Status:  alert and oriented to person place day and situation. Follows 1-2 step commands.              Cranial Nerves: Grossly intact, aside from baseline central field VI.  Pupils round, reactive to light. Slight anisocoria, L > R              Sensory: Grossly normal to light touch in bilateral upper and lower extremities               Strength: Moving BUE and RLE limbs " with at least greater than antigravity strength.               Reflexes: not assessed              Tone per modified Rula Scale: none              Abnormal movements: None               Speech: Fluent with intact comprehension and repetition. Voice is soft, hoarse              Cognition: intact to superficial conversation with recall of historic events but not of some recent              Gait: deferred      LABS AND IMAGING:  Results for orders placed or performed during the hospital encounter of 09/24/23 (from the past 24 hour(s))   Heparin Unfractionated Anti Xa Level   Result Value Ref Range    Anti Xa Unfractionated Heparin 0.45 For Reference Range, See Comment IU/mL    Narrative    Therapeutic Range: UFH: 0.25-0.50 IU/mL for low intensity dosing,  0.30-0.70 IU/mL for high intensity dosing DVT and PE.  This test is not validated for other direct factor X inhibitors (e.g. rivaroxaban, apixaban, edoxaban, betrixaban, fondaparinux) and should not be used for monitoring of other medications.   Heparin Unfractionated Anti Xa Level   Result Value Ref Range    Anti Xa Unfractionated Heparin 0.42 For Reference Range, See Comment IU/mL    Narrative    Therapeutic Range: UFH: 0.25-0.50 IU/mL for low intensity dosing,  0.30-0.70 IU/mL for high intensity dosing DVT and PE.  This test is not validated for other direct factor X inhibitors (e.g. rivaroxaban, apixaban, edoxaban, betrixaban, fondaparinux) and should not be used for monitoring of other medications.   Magnesium   Result Value Ref Range    Magnesium 2.1 1.7 - 2.3 mg/dL   Basic metabolic panel   Result Value Ref Range    Sodium 136 135 - 145 mmol/L    Potassium 5.1 3.4 - 5.3 mmol/L    Chloride 103 98 - 107 mmol/L    Carbon Dioxide (CO2) 20 (L) 22 - 29 mmol/L    Anion Gap 13 7 - 15 mmol/L    Urea Nitrogen 60.0 (H) 8.0 - 23.0 mg/dL    Creatinine 2.01 (H) 0.67 - 1.17 mg/dL    GFR Estimate 35 (L) >60 mL/min/1.73m2    Calcium 9.0 8.8 - 10.2 mg/dL    Glucose 117 (H)  70 - 99 mg/dL   CBC with platelets   Result Value Ref Range    WBC Count 6.0 4.0 - 11.0 10e3/uL    RBC Count 2.48 (L) 4.40 - 5.90 10e6/uL    Hemoglobin 7.6 (L) 13.3 - 17.7 g/dL    Hematocrit 23.8 (L) 40.0 - 53.0 %    MCV 96 78 - 100 fL    MCH 30.6 26.5 - 33.0 pg    MCHC 31.9 31.5 - 36.5 g/dL    RDW 16.5 (H) 10.0 - 15.0 %    Platelet Count 141 (L) 150 - 450 10e3/uL   Phosphorus   Result Value Ref Range    Phosphorus 5.3 (H) 2.5 - 4.5 mg/dL   Heparin Unfractionated Anti Xa Level   Result Value Ref Range    Anti Xa Unfractionated Heparin 0.48 For Reference Range, See Comment IU/mL    Narrative    Therapeutic Range: UFH: 0.25-0.50 IU/mL for low intensity dosing,  0.30-0.70 IU/mL for high intensity dosing DVT and PE.  This test is not validated for other direct factor X inhibitors (e.g. rivaroxaban, apixaban, edoxaban, betrixaban, fondaparinux) and should not be used for monitoring of other medications.

## 2023-11-10 NOTE — CONSULTS
"  Health Psychology                                                                                                                          Berkley Arshad, Ph.D., L.P [phone]  Shell Ford, Ph.D., L.P. [phone]  Cinda Pittman, Ph.D, L..P. [phone]  Aria Teran, Ph.D., L.P. [phone]  Nestor Emerson, Ph.D., A.B.P.P., L.P. [phone]         Danae Venegas, Ph.D., L.P. [phone]       Melissa Chacon, Ph.D., A.B.P.P., LP [phone]           [facility] [place], 3rd Floor  [address] [place] [address]      Inpatient Health Psychology Consultation    Date of Service:  11/10/23    BACKGROUND:  Per EMR: \"Alfredo Burnham is a 68 year old male with PMH of HTN and previous TIA who presented with R sided abdominal pain found to have contained ruptured AAA, now s/p open AAA repair 9/24 into 9/25am...On 9/29 he was started on CRRT and subsequently iHD. S/p closure of abdominal fascia and subcutaneous tissue wound VAC applied 10/2/23. 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 On 10/20/23 underwent abdominal incision closure.\"    Health psychology consulted by ALTAGRACIA Figueroa, with Vascular Surgery to assess and treat psychological adjustment symptoms as indicated given acute and unexpected medical course and prolonged admission.     SUBJECTIVE:  Met with Nelson with his wife Tabby benton. Introduced Health Psychology services and discussed nature of referral.    Nelson shared experiences with the current admission and how he feel she is coping. He shared how shocking it has all been but was also able to recognize progress he has made with his recovery. He denied posttraumatic symptoms and overt anxiety symptoms. Exhibited and described mood-related symptoms and experiences such as grief, tearfulness, sadness about the future. These symptoms were mild at the time of visit and amenable to cognitive restructuring from this writer and his family.     Both Nelson and " his wife highlighted that he has had to overcome life changing medical impacts in the past with his blindness and together they found ways to adjust. They are both confident they can do it now but understand there are many steps along the road to recovery.     Nelson and his wife noted more emotional challenges with dietary goals and having feeding tube/trying to eat. Period of time with increased diarrhea and great psychological distress. This has improved in past days.    Provided psychoeducation about psychological adjustment to illness or injury. Shared with him expectations for [facility] and how things work in that facility. Highlighted importance of psychological flexibility and recovering from setbacks. Applauded Nelson for his willingness to engage in discussion today and discuss his feelings. Highlighted examples of adaptive coping skills exhibited in the visit.    Nelson and his family were engaged in the visit and expressed understanding of information provided.      OBJECTIVE:  Nelson was lying back in his bed during our visit. He reported sad to fair mood, affect mood- and thought-congruent; tearful at times. Thought processes logical and linear. Insight and judgment good. Speech WNL. Denied suicidal ideation.        ASSESSMENT:  Nelson denies clinically significant psychiatric history, endorses periods of time with greater sadness/depressive symptoms consistent with an adjustment disorder response. He has a good prognosis for psychological stabilizing given history of adjusting to major medical changes and strong social support.      DIAGNOSIS:  Adjustment disorder with depressed mood      PLAN:  Plan for health psychology to follow this patient approximately once per week for the duration of their hospitalization.     Please feel free to call in urgent concerns arise prior to the next follow-up session.     Cinda Pittman, PhD,   Clinical Health Psychologist  Phone: [phone]  Pager: [phone]      Time in:  3:30  Time out: 4:15

## 2023-11-11 ENCOUNTER — APPOINTMENT (OUTPATIENT)
Dept: SPEECH THERAPY | Facility: CLINIC | Age: 68
DRG: 268 | End: 2023-11-11
Payer: COMMERCIAL

## 2023-11-11 ENCOUNTER — APPOINTMENT (OUTPATIENT)
Dept: PHYSICAL THERAPY | Facility: CLINIC | Age: 68
DRG: 268 | End: 2023-11-11
Payer: COMMERCIAL

## 2023-11-11 LAB
ANION GAP SERPL CALCULATED.3IONS-SCNC: 10 MMOL/L (ref 7–15)
BUN SERPL-MCNC: 59.2 MG/DL (ref 8–23)
CALCIUM SERPL-MCNC: 9.1 MG/DL (ref 8.8–10.2)
CHLORIDE SERPL-SCNC: 101 MMOL/L (ref 98–107)
CREAT SERPL-MCNC: 1.99 MG/DL (ref 0.67–1.17)
DEPRECATED HCO3 PLAS-SCNC: 22 MMOL/L (ref 22–29)
EGFRCR SERPLBLD CKD-EPI 2021: 35 ML/MIN/1.73M2
ERYTHROCYTE [DISTWIDTH] IN BLOOD BY AUTOMATED COUNT: 16.7 % (ref 10–15)
GLUCOSE SERPL-MCNC: 120 MG/DL (ref 70–99)
HCT VFR BLD AUTO: 23.9 % (ref 40–53)
HGB BLD-MCNC: 7.5 G/DL (ref 13.3–17.7)
MAGNESIUM SERPL-MCNC: 2 MG/DL (ref 1.7–2.3)
MCH RBC QN AUTO: 30.4 PG (ref 26.5–33)
MCHC RBC AUTO-ENTMCNC: 31.4 G/DL (ref 31.5–36.5)
MCV RBC AUTO: 97 FL (ref 78–100)
PHOSPHATE SERPL-MCNC: 5.1 MG/DL (ref 2.5–4.5)
PLATELET # BLD AUTO: 136 10E3/UL (ref 150–450)
POTASSIUM SERPL-SCNC: 5.5 MMOL/L (ref 3.4–5.3)
RBC # BLD AUTO: 2.47 10E6/UL (ref 4.4–5.9)
SODIUM SERPL-SCNC: 133 MMOL/L (ref 135–145)
UFH PPP CHRO-ACNC: 0.49 IU/ML
WBC # BLD AUTO: 6.5 10E3/UL (ref 4–11)

## 2023-11-11 PROCEDURE — 83735 ASSAY OF MAGNESIUM: CPT

## 2023-11-11 PROCEDURE — 120N000003 HC R&B IMCU UMMC

## 2023-11-11 PROCEDURE — 250N000013 HC RX MED GY IP 250 OP 250 PS 637: Performed by: SURGERY

## 2023-11-11 PROCEDURE — 97530 THERAPEUTIC ACTIVITIES: CPT | Mod: GP

## 2023-11-11 PROCEDURE — 250N000011 HC RX IP 250 OP 636: Mod: JZ | Performed by: NURSE PRACTITIONER

## 2023-11-11 PROCEDURE — 80048 BASIC METABOLIC PNL TOTAL CA: CPT | Performed by: NURSE PRACTITIONER

## 2023-11-11 PROCEDURE — 250N000013 HC RX MED GY IP 250 OP 250 PS 637

## 2023-11-11 PROCEDURE — 84520 ASSAY OF UREA NITROGEN: CPT | Performed by: NURSE PRACTITIONER

## 2023-11-11 PROCEDURE — 250N000013 HC RX MED GY IP 250 OP 250 PS 637: Performed by: NURSE PRACTITIONER

## 2023-11-11 PROCEDURE — 84100 ASSAY OF PHOSPHORUS: CPT | Performed by: NURSE PRACTITIONER

## 2023-11-11 PROCEDURE — 85027 COMPLETE CBC AUTOMATED: CPT | Performed by: NURSE PRACTITIONER

## 2023-11-11 PROCEDURE — 92526 ORAL FUNCTION THERAPY: CPT | Mod: GN

## 2023-11-11 PROCEDURE — 85520 HEPARIN ASSAY: CPT | Performed by: SURGERY

## 2023-11-11 PROCEDURE — 36415 COLL VENOUS BLD VENIPUNCTURE: CPT | Performed by: NURSE PRACTITIONER

## 2023-11-11 PROCEDURE — 250N000013 HC RX MED GY IP 250 OP 250 PS 637: Performed by: STUDENT IN AN ORGANIZED HEALTH CARE EDUCATION/TRAINING PROGRAM

## 2023-11-11 RX ORDER — MAGNESIUM OXIDE 400 MG/1
400 TABLET ORAL EVERY 4 HOURS
Status: COMPLETED | OUTPATIENT
Start: 2023-11-11 | End: 2023-11-11

## 2023-11-11 RX ORDER — QUETIAPINE FUMARATE 25 MG/1
50 TABLET, FILM COATED ORAL
Status: DISCONTINUED | OUTPATIENT
Start: 2023-11-11 | End: 2023-11-17 | Stop reason: HOSPADM

## 2023-11-11 RX ADMIN — PSYLLIUM HUSK 1 PACKET: 3.4 POWDER ORAL at 12:16

## 2023-11-11 RX ADMIN — ACETAMINOPHEN 975 MG: 325 TABLET, FILM COATED ORAL at 18:17

## 2023-11-11 RX ADMIN — PANCRELIPASE 2 CAPSULE: 24000; 76000; 120000 CAPSULE, DELAYED RELEASE PELLETS ORAL at 11:49

## 2023-11-11 RX ADMIN — ACETAMINOPHEN 975 MG: 325 TABLET, FILM COATED ORAL at 12:16

## 2023-11-11 RX ADMIN — AMLODIPINE BESYLATE 10 MG: 10 TABLET ORAL at 09:32

## 2023-11-11 RX ADMIN — QUETIAPINE FUMARATE 50 MG: 50 TABLET ORAL at 19:46

## 2023-11-11 RX ADMIN — HEPARIN SODIUM 1550 UNITS/HR: 10000 INJECTION, SOLUTION INTRAVENOUS at 14:15

## 2023-11-11 RX ADMIN — Medication 40 MG: at 09:34

## 2023-11-11 RX ADMIN — MAGNESIUM OXIDE TAB 400 MG (241.3 MG ELEMENTAL MG) 400 MG: 400 (241.3 MG) TAB at 18:17

## 2023-11-11 RX ADMIN — ATORVASTATIN CALCIUM 10 MG: 10 TABLET, FILM COATED ORAL at 19:45

## 2023-11-11 RX ADMIN — METOPROLOL TARTRATE 100 MG: 50 TABLET, FILM COATED ORAL at 19:45

## 2023-11-11 RX ADMIN — PANCRELIPASE 2 CAPSULE: 24000; 76000; 120000 CAPSULE, DELAYED RELEASE PELLETS ORAL at 18:26

## 2023-11-11 RX ADMIN — TRIAMCINOLONE ACETONIDE: 1 OINTMENT TOPICAL at 19:46

## 2023-11-11 RX ADMIN — HYDROXYZINE HYDROCHLORIDE 25 MG: 25 TABLET ORAL at 22:29

## 2023-11-11 RX ADMIN — Medication 60 ML: at 09:42

## 2023-11-11 RX ADMIN — HYDROCORTISONE: 25 OINTMENT TOPICAL at 19:46

## 2023-11-11 RX ADMIN — Medication 50 MCG: at 09:31

## 2023-11-11 RX ADMIN — Medication 1 MG: at 18:18

## 2023-11-11 RX ADMIN — TRIAMCINOLONE ACETONIDE: 1 OINTMENT TOPICAL at 09:34

## 2023-11-11 RX ADMIN — HYDROCORTISONE: 25 OINTMENT TOPICAL at 09:34

## 2023-11-11 RX ADMIN — ACETAMINOPHEN 975 MG: 325 TABLET, FILM COATED ORAL at 23:50

## 2023-11-11 RX ADMIN — QUETIAPINE FUMARATE 50 MG: 50 TABLET ORAL at 09:32

## 2023-11-11 RX ADMIN — PANCRELIPASE 2 CAPSULE: 24000; 76000; 120000 CAPSULE, DELAYED RELEASE PELLETS ORAL at 09:41

## 2023-11-11 RX ADMIN — ACETAMINOPHEN 975 MG: 325 TABLET, FILM COATED ORAL at 05:41

## 2023-11-11 RX ADMIN — METOPROLOL TARTRATE 100 MG: 50 TABLET, FILM COATED ORAL at 09:32

## 2023-11-11 RX ADMIN — MAGNESIUM OXIDE TAB 400 MG (241.3 MG ELEMENTAL MG) 400 MG: 400 (241.3 MG) TAB at 19:45

## 2023-11-11 RX ADMIN — QUETIAPINE FUMARATE 100 MG: 100 TABLET ORAL at 22:29

## 2023-11-11 ASSESSMENT — ACTIVITIES OF DAILY LIVING (ADL)
ADLS_ACUITY_SCORE: 47

## 2023-11-11 NOTE — PROGRESS NOTES
Vascular Surgery Progress Note  11/11/2023       Subjective:  Creatinine remains in the 1.9-2 range. He remains afebrile without leukocytosis stable hemoglobin and therapeutic heparin drip. He reports he is trying to eat, although note that this is difficult given his current situation. Encouraged eating and asking for help and assistance to try to take more in. He reports diarrhea has not been an issue.     Objective:  Temp:  [98  F (36.7  C)-98.7  F (37.1  C)] 98.1  F (36.7  C)  Pulse:  [84-99] 85  Resp:  [13-20] 20  BP: (110-142)/(74-91) 131/82  SpO2:  [97 %-99 %] 97 %    I/O last 3 completed shifts:  In: 880 [P.O.:230; NG/GT:540]  Out: 1810 [Urine:1800; Drains:10]      Gen: resting comfortably in bed, answering questions appropriately, no delirium/confusion, whispering words, not in acute distress  CV: regular rate and rhythm per tele  Resp: non-labored, on room air   Abd: Abdominal incision c/d/I.  Ext: RLE wwp, palpable DP pulse, LLE stump incision nonerythematous  Prevena LLE placed on 11/06/23 - to be replaced in 5-7 days  Skin: Blanching erythema resolved.     Labs:  Recent Labs   Lab 11/11/23  0554 11/10/23  0354 11/09/23  0409   WBC 6.5 6.0 7.1   HGB 7.5* 7.6* 7.8*   * 141* 147*       Recent Labs   Lab 11/11/23  0554 11/10/23  0354 11/09/23  0409   * 136 134*   POTASSIUM 5.5* 5.1 4.8   CHLORIDE 101 103 102   CO2 22 20* 20*   BUN 59.2* 60.0* 56.4*   CR 1.99* 2.01* 1.93*   * 117* 123*   OMAR 9.1 9.0 9.1   MAG 2.0 2.1 2.0   PHOS 5.1* 5.3* 5.1*      Assessment/Plan:   Alfredo Burnham is a 68 year old male with PMH of HTN and previous TIA who presented with R sided abdominal pain found to have contained ruptured AAA, now s/p open AAA repair 9/24 into 9/25am with 30 min supraceliac clamp time, prolonged inter-renal clamp time, temporary abdominal closure. He did have bloody stool postop with flex sig 9/25 demonstrating ischemic mucosal changes of the rectum, repeated abdominal without  evidence of transmural necrosis of the small or large intestine, or the rectum. On 9/29 he was started on CRRT and subsequently iHD. S/p closure of abdominal fascia and subcutaneous tissue wound VAC applied 10/2/23. 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 On 10/20/23 underwent abdominal incision closure.     10/24/23 found to have hgb 5.8, CTA demonstrated left psoas muscle hematoma now size 6 cm x 3 cm x 3 cm, barely seen on CTA from 10/13. Lesser sac of the peritoneal cavity collection stable, hematoma in left iliac stable, no active extravasation. Hep gtt held.     Overnight 10/24-10/25 patient more lethargic with fevers at 102.1 thought to be strokelike symptoms, stroke code called, workup negative. Hemoglobin dropped again to 5.8, despite holding heparin for 24 hours. Repeat CTA: demonstrated hematoma in LLE stump, with otherwise stable left iliac hematoma and stable left psoas muscle hematoma. Now resoled: patient's hgb stable, he is progressing well with continued improvements. Off iHD with renal recovery, tunneled line removed and cleared by SLP for diet. With poor PO intake.     Neuro:   - Seroquel at bedtime and as needed for anxiety/delirium episodes  - Previously had stroke code called for unequal pupils, c/f facial droop 10/6: workup revealed: Numerous, punctate late hyperacute to acute infarctions thought to be embolic in nature. No embolic source identified in workup.  - Follow-up with neurology 4-6 weeks after discharge, ok with a/c.     HEENT:   - ENT consult for ongoing dysphonia/dysphagia: endoscopy with L vocal cord paralysis. S/p bedside vocal fold injection 11/1/23 with otolaryngology. Continues to have dysphonia/dysphagia. Will re-engage ENT Monday if no improvement.    CV:   - Amlodipine 10mg daily (at home on amlodipine 5mg and benazepril 20mg) due to SBP>160 requiring Labetalol IV. No benzepril for now. Added Metoprolol, up to 100mg PO tolerating well.    - Goal SBP <160, MAP >65  - Labetalol prn  for SBP >160  - Continue statin  - PE+, venous duplex with nonocclusive to occlusive thrombus of the left GSV from the level of the knee to the groin and nonocclusive thrombus of the left distal femoral vein and popliteal veins, increased in extent compared to 9/30/2023.   - Heparin drip low intensity, with therapeutic anti-Xa, no plan to change to high intensity, appropriately anticoagulated. Will transition to DOAC the day before discharge, will remain on heparin drip until the day before transfer to rehab.    Resp:   - On room air, CXR clear    GI:   - Concern for Pancreatitis with peripancreatic fluid collection on CT 10/13 with question of bleeding into the collection. GI signed off as collection not drainable. - Improved appearance on repeat CT 10/25. Monitoring.    - TF tolerating via NJT, nocturnal feeds: addition of Relizorb per pt showing signs of pancreatic insufficiency and changing the TF formula  - Nutrition following, appreciate recommendations, on calorie count: Monitor ability for pt to consume at least ~1250 Kcals and ~65 gm protein before considering removal of FT (65% est needs)   - When stool consistency firms, will order fecal elastase levels to officially check for pancreatic insufficiency. Holding off on this at present, as watery stool sample will result in falsely diluted levels (less accurate).  - Cleared by SLP For IDDSI level 6 soft and bite sized diet with level 3 moderately thickened liquids (NO straws, 1:1 observation, patient is blind, must be sitting upright, alert). FEES ordered for Monday due to silent aspirations and continued dysphonia/dysphagia despite ENT injection of vocal folds.   - Please assist with meals as much as possible due to patient vision impairment   - Change abd dressing daily  - Loose stools -- added psyllium daily and imodium 4x/day as needed. C. difficile negative.    FEN:   - Electrolyte replacement prn     Renal:   - Cr plateau at 1.9-2 Continues to make  good amount of urine.   - Last iHD 10/27.  - Tunneled line removed with IR 11/2/23, nephrology signed off    Heme:   - Hgb 7.7  - DVT as above, PE   - Heparin drip low intensity, with therapeutic anti-Xa. No plan to transition to high intensity, appropriately anticoagulated. Will transition to DOAC the day prior to discharge.    ID:   -No further fevers, no leukocytosis. Patient has allergy to cefepime (hives). Continue to monitor for fevers, leukocytosis.  - monitor signs of pneumonia  - nystatin swish for oral candida    MSK:   - L AKA 10/17/23: prevena applied 10/30/23, to stay on for 5 to 7 days  - ACE wrap on L AKA at all times  - Orthotics provided stump protector and  11/1/23    Derm:   - Rash significantly improved, not cellulitis. Pharm reviewed meds, all low risk, derm consulted, ID has seen as well, all felt were likely drug induced presumably from cefepime  - Did receive 1 dose cefepime 10/25 - this may be cause of worsening. May take 1-2 weeks to clear up.  - Appreciate dermatology recs, likely morbilliform eruption vs DRESS (low risk)    - triamcinolone 0.1% changed back to BID scheduled     Misc:   - Pediatric/Small tubes for blood draws (spoke with lab, included this request in daily lab draws)  - Appreciate aggressive PT/OT ROM, patient severely deconditioned and requires extensive physical therapy: Okay with lifting the equivalent of a gallon of water. Restrict utilizing upper extremities for lifting himself, transfers with upper extremities to chair, etc until 11/16/23 after which can be liberalized.  - Abd binder on at all times.  - Please keep LLE AKA wrapped with ACE.  - IMC status.  - Anticipate will require ARU at discharge (SW engaged). PMR recommends ARU, potentially next week.        Seen and discussed patient with Dr. Larson, vascular surgery staff.    Will Donald MD   Vascular Surgery PGY3

## 2023-11-11 NOTE — PLAN OF CARE
Goal Outcome Evaluation:        Neuro: A&Ox4. Forgetful at times  Cardiac: SR. HR 90s. PRN labetalol and hydralazine available for SBP > 160.   Respiratory: Sating >93% on RA.  GI/: Adequate urine output via urinal. No BM this shift. imodium changed to PRN   Diet/appetite: Tolerating soft and bite sized level 6 diet with mod thick liquids. No straws. Poor appetite. Cycled TF from 8p-8a @ 55 mL/hr with 30mL FWF Q 4 hour via ND. Meds crushed and thru ND. Continue abdiel counts- only ate minimal. Needs lots of encouragement and 1:1 feeding assistance per speech.    Activity:  Assist of 1-2 in bed. 2 assist lift to or from chair. MAOS WINSLOW  Pain: denies. Scheduled tylenol given   Skin: No new deficits noted. ABD binder on   LDA's: R/L 2 PIV, hep ggt @ 1550 unit/hr. Therapeutic. Next draw @ 4am. Wound vac to AMOS WINSLOW @ -125.     Encourage oral intake

## 2023-11-11 NOTE — PROGRESS NOTES
Calorie Count  Intake recorded for: 11/10  Total Kcals: 102 Total Protein: 6g  Kcals from Hospital Food: 86   Protein: 3g  Kcals from Outside Food (average): 16 Protein: 3g  # Meals Ordered from Kitchen: 3 meals  # Meals Recorded: 3 meals (First - 10% oatmeal)      (Second - 25% cream of potato soup)      (Third - 15% hamburger saulo w/ gravy)  # Supplements Recorded: 10% 1 Premier Protein Strawberries & Cream   Note: Pt recorded having 100% 1 Boost, but did not specify type of Boost, unable to calculate calories and protein.      yes

## 2023-11-11 NOTE — PLAN OF CARE
Goal Outcome Evaluation:       Neuro: A&Ox4.   Cardiac: SR. VSS. -136.   Respiratory: Sating 96-99% on RA.  GI/: Adequate urine output. BMX1 (soft, loose, peanut-buttery). TID loperamide shifted to PRN starting 11/10.  Diet/appetite: On soft bite-sized lvl 3 and Mod thick fluids. Needs encouragement. On Cycled tube feeding between 5797-7160 at 55ml/hr with 30 FWF Q4 via ND tube.   Activity:  Assist of 1-2 in bed an dup to chair. Left AKA.   Pain: Denied pain. On Scheduled Tylenol.   Skin: No new deficits noted.  LDA's: L PIV SL.  R PIV with Heparin drip running 1550 unit/hr. Wound vac on L AKA @ continuous 125 mmhg.    Plan: Continue with POC. Notify primary team with changes.

## 2023-11-11 NOTE — PLAN OF CARE
Goal Outcome Evaluation:       Neuro: A&Ox4. Forgetful at times  Cardiac: SR/ST. HR 100s. PRN labetalol and hydralazine available for SBP > 160.   Respiratory: Sating >93% on RA.  GI/: Adequate urine output via urinal. No BM this shift. imodium changed to PRN   Diet/appetite: Tolerating soft and bite sized level 3 diet with mod thick liquids. No straws. Poor appetite. Cycled TF from 4p-8a @ 55 mL/hr with 30mL FWF Q 4 hour via ND. Meds crushed and thru ND. Continue abdiel counts- only ate minimal. Needs lots of encouragement and 1:1 feeding assistance per speech.    Activity:  Assist of 1-2 in bed. 2 assist lift to or from chair. AMOS WINSLOW  Pain: denies. Scheduled tylenol given   Skin: No new deficits noted. ABD binder on   LDA's: R/L 2 PIV, hep ggt @ 1550 unit/hr. Therapeutic. Next draw @ 4am. Wound vac to AMOS WINSLOW @ -125.         Encourage oral intake

## 2023-11-12 ENCOUNTER — APPOINTMENT (OUTPATIENT)
Dept: OCCUPATIONAL THERAPY | Facility: CLINIC | Age: 68
DRG: 268 | End: 2023-11-12
Payer: COMMERCIAL

## 2023-11-12 LAB
ANION GAP SERPL CALCULATED.3IONS-SCNC: 10 MMOL/L (ref 7–15)
ANION GAP SERPL CALCULATED.3IONS-SCNC: 11 MMOL/L (ref 7–15)
BUN SERPL-MCNC: 56.5 MG/DL (ref 8–23)
BUN SERPL-MCNC: 56.7 MG/DL (ref 8–23)
CALCIUM SERPL-MCNC: 9.3 MG/DL (ref 8.8–10.2)
CALCIUM SERPL-MCNC: 9.4 MG/DL (ref 8.8–10.2)
CHLORIDE SERPL-SCNC: 101 MMOL/L (ref 98–107)
CHLORIDE SERPL-SCNC: 103 MMOL/L (ref 98–107)
CREAT SERPL-MCNC: 1.9 MG/DL (ref 0.67–1.17)
CREAT SERPL-MCNC: 1.95 MG/DL (ref 0.67–1.17)
DEPRECATED HCO3 PLAS-SCNC: 21 MMOL/L (ref 22–29)
DEPRECATED HCO3 PLAS-SCNC: 21 MMOL/L (ref 22–29)
EGFRCR SERPLBLD CKD-EPI 2021: 36 ML/MIN/1.73M2
EGFRCR SERPLBLD CKD-EPI 2021: 37 ML/MIN/1.73M2
ERYTHROCYTE [DISTWIDTH] IN BLOOD BY AUTOMATED COUNT: 16.5 % (ref 10–15)
GLUCOSE SERPL-MCNC: 117 MG/DL (ref 70–99)
GLUCOSE SERPL-MCNC: 121 MG/DL (ref 70–99)
HCT VFR BLD AUTO: 24.5 % (ref 40–53)
HGB BLD-MCNC: 7.8 G/DL (ref 13.3–17.7)
MAGNESIUM SERPL-MCNC: 2.1 MG/DL (ref 1.7–2.3)
MCH RBC QN AUTO: 30.7 PG (ref 26.5–33)
MCHC RBC AUTO-ENTMCNC: 31.8 G/DL (ref 31.5–36.5)
MCV RBC AUTO: 97 FL (ref 78–100)
PHOSPHATE SERPL-MCNC: 5.3 MG/DL (ref 2.5–4.5)
PLATELET # BLD AUTO: 124 10E3/UL (ref 150–450)
POTASSIUM SERPL-SCNC: 5.1 MMOL/L (ref 3.4–5.3)
POTASSIUM SERPL-SCNC: 5.5 MMOL/L (ref 3.4–5.3)
RBC # BLD AUTO: 2.54 10E6/UL (ref 4.4–5.9)
SODIUM SERPL-SCNC: 133 MMOL/L (ref 135–145)
SODIUM SERPL-SCNC: 134 MMOL/L (ref 135–145)
UFH PPP CHRO-ACNC: 0.44 IU/ML
WBC # BLD AUTO: 6.1 10E3/UL (ref 4–11)

## 2023-11-12 PROCEDURE — 250N000013 HC RX MED GY IP 250 OP 250 PS 637: Performed by: SURGERY

## 2023-11-12 PROCEDURE — 85027 COMPLETE CBC AUTOMATED: CPT | Performed by: NURSE PRACTITIONER

## 2023-11-12 PROCEDURE — 250N000013 HC RX MED GY IP 250 OP 250 PS 637

## 2023-11-12 PROCEDURE — 250N000011 HC RX IP 250 OP 636: Mod: JZ | Performed by: NURSE PRACTITIONER

## 2023-11-12 PROCEDURE — 83735 ASSAY OF MAGNESIUM: CPT

## 2023-11-12 PROCEDURE — 85520 HEPARIN ASSAY: CPT | Performed by: NURSE PRACTITIONER

## 2023-11-12 PROCEDURE — 97110 THERAPEUTIC EXERCISES: CPT | Mod: GO

## 2023-11-12 PROCEDURE — 36415 COLL VENOUS BLD VENIPUNCTURE: CPT

## 2023-11-12 PROCEDURE — 80048 BASIC METABOLIC PNL TOTAL CA: CPT | Performed by: NURSE PRACTITIONER

## 2023-11-12 PROCEDURE — 250N000013 HC RX MED GY IP 250 OP 250 PS 637: Performed by: NURSE PRACTITIONER

## 2023-11-12 PROCEDURE — 36415 COLL VENOUS BLD VENIPUNCTURE: CPT | Performed by: NURSE PRACTITIONER

## 2023-11-12 PROCEDURE — 250N000013 HC RX MED GY IP 250 OP 250 PS 637: Performed by: STUDENT IN AN ORGANIZED HEALTH CARE EDUCATION/TRAINING PROGRAM

## 2023-11-12 PROCEDURE — 250N000011 HC RX IP 250 OP 636: Mod: JZ

## 2023-11-12 PROCEDURE — 80048 BASIC METABOLIC PNL TOTAL CA: CPT

## 2023-11-12 PROCEDURE — 84100 ASSAY OF PHOSPHORUS: CPT | Performed by: NURSE PRACTITIONER

## 2023-11-12 PROCEDURE — 120N000003 HC R&B IMCU UMMC

## 2023-11-12 RX ORDER — CALCIUM CARBONATE 1250 MG/5ML
1250 SUSPENSION ORAL 2 TIMES DAILY PRN
Status: DISCONTINUED | OUTPATIENT
Start: 2023-11-12 | End: 2023-11-13

## 2023-11-12 RX ORDER — FUROSEMIDE 10 MG/ML
20 INJECTION INTRAMUSCULAR; INTRAVENOUS ONCE
Status: COMPLETED | OUTPATIENT
Start: 2023-11-12 | End: 2023-11-12

## 2023-11-12 RX ADMIN — PANCRELIPASE 2 CAPSULE: 24000; 76000; 120000 CAPSULE, DELAYED RELEASE PELLETS ORAL at 08:04

## 2023-11-12 RX ADMIN — METOPROLOL TARTRATE 100 MG: 50 TABLET, FILM COATED ORAL at 20:02

## 2023-11-12 RX ADMIN — ACETAMINOPHEN 975 MG: 325 TABLET, FILM COATED ORAL at 05:54

## 2023-11-12 RX ADMIN — ACETAMINOPHEN 975 MG: 325 TABLET, FILM COATED ORAL at 12:18

## 2023-11-12 RX ADMIN — PANCRELIPASE 2 CAPSULE: 24000; 76000; 120000 CAPSULE, DELAYED RELEASE PELLETS ORAL at 17:49

## 2023-11-12 RX ADMIN — ACETAMINOPHEN 975 MG: 325 TABLET, FILM COATED ORAL at 17:48

## 2023-11-12 RX ADMIN — QUETIAPINE FUMARATE 50 MG: 50 TABLET ORAL at 20:02

## 2023-11-12 RX ADMIN — HYDROCORTISONE: 25 OINTMENT TOPICAL at 20:02

## 2023-11-12 RX ADMIN — QUETIAPINE FUMARATE 100 MG: 100 TABLET ORAL at 21:48

## 2023-11-12 RX ADMIN — Medication 40 MG: at 08:14

## 2023-11-12 RX ADMIN — CALCIUM CARBONATE 1250 MG: 1250 SUSPENSION ORAL at 16:26

## 2023-11-12 RX ADMIN — Medication 60 ML: at 08:27

## 2023-11-12 RX ADMIN — HEPARIN SODIUM 1550 UNITS/HR: 10000 INJECTION, SOLUTION INTRAVENOUS at 05:10

## 2023-11-12 RX ADMIN — Medication 50 MCG: at 08:13

## 2023-11-12 RX ADMIN — METOPROLOL TARTRATE 100 MG: 50 TABLET, FILM COATED ORAL at 08:14

## 2023-11-12 RX ADMIN — OXYCODONE HYDROCHLORIDE 5 MG: 5 TABLET ORAL at 16:26

## 2023-11-12 RX ADMIN — PSYLLIUM HUSK 1 PACKET: 3.4 POWDER ORAL at 08:10

## 2023-11-12 RX ADMIN — HEPARIN SODIUM 1550 UNITS/HR: 10000 INJECTION, SOLUTION INTRAVENOUS at 20:55

## 2023-11-12 RX ADMIN — ATORVASTATIN CALCIUM 10 MG: 10 TABLET, FILM COATED ORAL at 20:02

## 2023-11-12 RX ADMIN — HYDROXYZINE HYDROCHLORIDE 25 MG: 25 TABLET ORAL at 08:44

## 2023-11-12 RX ADMIN — FUROSEMIDE 20 MG: 10 INJECTION, SOLUTION INTRAMUSCULAR; INTRAVENOUS at 08:42

## 2023-11-12 RX ADMIN — Medication 1 MG: at 17:48

## 2023-11-12 RX ADMIN — PANCRELIPASE 2 CAPSULE: 24000; 76000; 120000 CAPSULE, DELAYED RELEASE PELLETS ORAL at 13:48

## 2023-11-12 RX ADMIN — AMLODIPINE BESYLATE 10 MG: 10 TABLET ORAL at 08:13

## 2023-11-12 RX ADMIN — HYDROCORTISONE: 25 OINTMENT TOPICAL at 08:23

## 2023-11-12 ASSESSMENT — ACTIVITIES OF DAILY LIVING (ADL)
ADLS_ACUITY_SCORE: 45
ADLS_ACUITY_SCORE: 45
ADLS_ACUITY_SCORE: 47
ADLS_ACUITY_SCORE: 45
ADLS_ACUITY_SCORE: 45
ADLS_ACUITY_SCORE: 47
ADLS_ACUITY_SCORE: 41
ADLS_ACUITY_SCORE: 45

## 2023-11-12 NOTE — PLAN OF CARE
Goal Outcome Evaluation:       Neuro: A&Ox4. Forgetful at times  Cardiac: SR on tele PRN labetalol and hydralazine available for SBP > 160.   Respiratory: Sating >93% on RA.  GI/: Adequate urine output via urinal. one time dose of IV lasix given. One BM this shift though stool sample unable to be collected.   Diet/appetite: Tolerating soft and bite sized level 6 diet with mod thick liquids. No straws. Poor appetite. Cycled TF from 8p-8a @ 55 mL/hr with 30mL FWF Q 4 hour via ND. Meds crushed and thru ND. Continue abdiel counts- only ate minimal. Needs lots of encouragement and 1:1 feeding assistance per speech.    Activity:  Assist of 1-2 in bed. 2 assist lift to or from chair. L AKA  Pain: had increasing AKA pain. 5mg oxy given  Skin: No new deficits noted. ABD binder on   LDA's: R/L 2 PIV, hep ggt @ 1550 unit/hr. Therapeutic. Next draw @ 4am. Wound vac to L AKA @ -125.     Encourage oral intake

## 2023-11-12 NOTE — PROGRESS NOTES
Calorie Count  Intake recorded for: 11/11  Total Kcals: 784 Total Protein: 49g  Kcals from Hospital Food: 384   Protein: 9g  Kcals from Outside Food (average):400 Protein: 40g  # Meals Ordered from Kitchen: 3  # Meals Recorded: 3   (Meal 1: 10% oatmeal with brown sugar)   (Meal 2: 10% soft-bite sized hamburger)  (Meal 3: 10% soft-bite sized pot roast, mashed potatoes with gravy, soft-bite sized jose de jesus food cake)  # Supplements Recorded: 100% 1 Original Boost, 100% 1 Premier Protein shake; 25% 1 Magic Cup; 10% 1 Magic Cup

## 2023-11-12 NOTE — PLAN OF CARE
Goal Outcome Evaluation:        Neuro: A&Ox4.   Cardiac: SR. VSS. -141.      Respiratory: Sating % on RA.  GI/: Adequate urine output.  Diet/appetite: On soft bite-sized lvl 3 and Mod thick fluids. Needs encouragement. On Cycled tube feeding between 0347-6286 at 55ml/hr with 30 FWF Q4 via ND tube.   Activity:  Assist of 1-2 in bed an dup to chair. Left AKA.   Pain: Denied pain. On Scheduled Tylenol.   Skin: Complained of generalized itching. PRN atarax given with full relief.   LDA's: L PIV SL.  R PIV with Heparin drip running 1550 unit/hr. Wound vac on L AKA @ continuous 125 mmhg. 130cc serosanguinous output this shift.      Plan: Continue with POC. Notify primary team with changes.

## 2023-11-13 ENCOUNTER — APPOINTMENT (OUTPATIENT)
Dept: PHYSICAL THERAPY | Facility: CLINIC | Age: 68
DRG: 268 | End: 2023-11-13
Payer: COMMERCIAL

## 2023-11-13 ENCOUNTER — APPOINTMENT (OUTPATIENT)
Dept: OCCUPATIONAL THERAPY | Facility: CLINIC | Age: 68
DRG: 268 | End: 2023-11-13
Payer: COMMERCIAL

## 2023-11-13 ENCOUNTER — APPOINTMENT (OUTPATIENT)
Dept: SPEECH THERAPY | Facility: CLINIC | Age: 68
DRG: 268 | End: 2023-11-13
Payer: COMMERCIAL

## 2023-11-13 LAB
ANION GAP SERPL CALCULATED.3IONS-SCNC: 12 MMOL/L (ref 7–15)
BUN SERPL-MCNC: 58.1 MG/DL (ref 8–23)
CALCIUM SERPL-MCNC: 9.5 MG/DL (ref 8.8–10.2)
CHLORIDE SERPL-SCNC: 101 MMOL/L (ref 98–107)
CREAT SERPL-MCNC: 1.85 MG/DL (ref 0.67–1.17)
DEPRECATED HCO3 PLAS-SCNC: 21 MMOL/L (ref 22–29)
EGFRCR SERPLBLD CKD-EPI 2021: 39 ML/MIN/1.73M2
ERYTHROCYTE [DISTWIDTH] IN BLOOD BY AUTOMATED COUNT: 16.5 % (ref 10–15)
GLUCOSE SERPL-MCNC: 119 MG/DL (ref 70–99)
HCT VFR BLD AUTO: 26.2 % (ref 40–53)
HGB BLD-MCNC: 8.1 G/DL (ref 13.3–17.7)
MAGNESIUM SERPL-MCNC: 2 MG/DL (ref 1.7–2.3)
MCH RBC QN AUTO: 30.5 PG (ref 26.5–33)
MCHC RBC AUTO-ENTMCNC: 30.9 G/DL (ref 31.5–36.5)
MCV RBC AUTO: 99 FL (ref 78–100)
PHOSPHATE SERPL-MCNC: 5.3 MG/DL (ref 2.5–4.5)
PLATELET # BLD AUTO: 131 10E3/UL (ref 150–450)
POTASSIUM SERPL-SCNC: 4.8 MMOL/L (ref 3.4–5.3)
RBC # BLD AUTO: 2.66 10E6/UL (ref 4.4–5.9)
SODIUM SERPL-SCNC: 134 MMOL/L (ref 135–145)
UFH PPP CHRO-ACNC: 0.37 IU/ML
WBC # BLD AUTO: 6.7 10E3/UL (ref 4–11)

## 2023-11-13 PROCEDURE — 83735 ASSAY OF MAGNESIUM: CPT

## 2023-11-13 PROCEDURE — 99024 POSTOP FOLLOW-UP VISIT: CPT | Performed by: NURSE PRACTITIONER

## 2023-11-13 PROCEDURE — 97530 THERAPEUTIC ACTIVITIES: CPT | Mod: GP

## 2023-11-13 PROCEDURE — 97530 THERAPEUTIC ACTIVITIES: CPT | Mod: GO

## 2023-11-13 PROCEDURE — 97110 THERAPEUTIC EXERCISES: CPT | Mod: GP

## 2023-11-13 PROCEDURE — 92612 ENDOSCOPY SWALLOW (FEES) VID: CPT | Mod: GN

## 2023-11-13 PROCEDURE — 97110 THERAPEUTIC EXERCISES: CPT | Mod: GO

## 2023-11-13 PROCEDURE — 92526 ORAL FUNCTION THERAPY: CPT | Mod: GN

## 2023-11-13 PROCEDURE — 250N000013 HC RX MED GY IP 250 OP 250 PS 637: Performed by: STUDENT IN AN ORGANIZED HEALTH CARE EDUCATION/TRAINING PROGRAM

## 2023-11-13 PROCEDURE — 80048 BASIC METABOLIC PNL TOTAL CA: CPT | Performed by: NURSE PRACTITIONER

## 2023-11-13 PROCEDURE — 85027 COMPLETE CBC AUTOMATED: CPT | Performed by: NURSE PRACTITIONER

## 2023-11-13 PROCEDURE — 82653 EL-1 FECAL QUANTITATIVE: CPT | Performed by: NURSE PRACTITIONER

## 2023-11-13 PROCEDURE — 84100 ASSAY OF PHOSPHORUS: CPT | Performed by: NURSE PRACTITIONER

## 2023-11-13 PROCEDURE — 250N000013 HC RX MED GY IP 250 OP 250 PS 637: Performed by: SURGERY

## 2023-11-13 PROCEDURE — 120N000002 HC R&B MED SURG/OB UMMC

## 2023-11-13 PROCEDURE — 85520 HEPARIN ASSAY: CPT | Performed by: NURSE PRACTITIONER

## 2023-11-13 PROCEDURE — 250N000013 HC RX MED GY IP 250 OP 250 PS 637: Performed by: NURSE PRACTITIONER

## 2023-11-13 PROCEDURE — 250N000013 HC RX MED GY IP 250 OP 250 PS 637

## 2023-11-13 PROCEDURE — 36415 COLL VENOUS BLD VENIPUNCTURE: CPT | Performed by: NURSE PRACTITIONER

## 2023-11-13 RX ORDER — CALCIUM CARBONATE 500 MG/1
500-1000 TABLET, CHEWABLE ORAL 2 TIMES DAILY PRN
Status: DISCONTINUED | OUTPATIENT
Start: 2023-11-13 | End: 2023-11-17 | Stop reason: HOSPADM

## 2023-11-13 RX ORDER — PANTOPRAZOLE SODIUM 40 MG/1
40 TABLET, DELAYED RELEASE ORAL
Status: DISCONTINUED | OUTPATIENT
Start: 2023-11-14 | End: 2023-11-17 | Stop reason: HOSPADM

## 2023-11-13 RX ADMIN — OXYCODONE HYDROCHLORIDE 5 MG: 5 TABLET ORAL at 15:14

## 2023-11-13 RX ADMIN — QUETIAPINE FUMARATE 100 MG: 100 TABLET ORAL at 21:59

## 2023-11-13 RX ADMIN — CALCIUM CARBONATE (ANTACID) CHEW TAB 500 MG 500 MG: 500 CHEW TAB at 12:21

## 2023-11-13 RX ADMIN — METOPROLOL TARTRATE 100 MG: 50 TABLET, FILM COATED ORAL at 07:47

## 2023-11-13 RX ADMIN — PANCRELIPASE 2 CAPSULE: 24000; 76000; 120000 CAPSULE, DELAYED RELEASE PELLETS ORAL at 13:30

## 2023-11-13 RX ADMIN — APIXABAN 5 MG: 5 TABLET, FILM COATED ORAL at 20:16

## 2023-11-13 RX ADMIN — Medication 50 MCG: at 07:47

## 2023-11-13 RX ADMIN — HYDROCORTISONE: 25 OINTMENT TOPICAL at 07:49

## 2023-11-13 RX ADMIN — ACETAMINOPHEN 975 MG: 325 TABLET, FILM COATED ORAL at 18:15

## 2023-11-13 RX ADMIN — ACETAMINOPHEN 975 MG: 325 TABLET, FILM COATED ORAL at 00:26

## 2023-11-13 RX ADMIN — QUETIAPINE FUMARATE 50 MG: 50 TABLET ORAL at 20:16

## 2023-11-13 RX ADMIN — TRIAMCINOLONE ACETONIDE: 1 OINTMENT TOPICAL at 20:17

## 2023-11-13 RX ADMIN — PANCRELIPASE 2 CAPSULE: 24000; 76000; 120000 CAPSULE, DELAYED RELEASE PELLETS ORAL at 18:17

## 2023-11-13 RX ADMIN — TRIAMCINOLONE ACETONIDE: 1 OINTMENT TOPICAL at 07:49

## 2023-11-13 RX ADMIN — ACETAMINOPHEN 975 MG: 325 TABLET, FILM COATED ORAL at 12:37

## 2023-11-13 RX ADMIN — ATORVASTATIN CALCIUM 10 MG: 10 TABLET, FILM COATED ORAL at 20:16

## 2023-11-13 RX ADMIN — HYDROXYZINE HYDROCHLORIDE 25 MG: 25 TABLET ORAL at 02:51

## 2023-11-13 RX ADMIN — Medication 1 MG: at 18:16

## 2023-11-13 RX ADMIN — Medication 60 ML: at 11:02

## 2023-11-13 RX ADMIN — HYDROCORTISONE: 25 OINTMENT TOPICAL at 20:17

## 2023-11-13 RX ADMIN — AMLODIPINE BESYLATE 10 MG: 10 TABLET ORAL at 07:47

## 2023-11-13 RX ADMIN — ACETAMINOPHEN 975 MG: 325 TABLET, FILM COATED ORAL at 06:22

## 2023-11-13 RX ADMIN — APIXABAN 5 MG: 5 TABLET, FILM COATED ORAL at 10:48

## 2023-11-13 RX ADMIN — Medication 40 MG: at 07:47

## 2023-11-13 RX ADMIN — METOPROLOL TARTRATE 100 MG: 50 TABLET, FILM COATED ORAL at 20:16

## 2023-11-13 ASSESSMENT — ACTIVITIES OF DAILY LIVING (ADL)
ADLS_ACUITY_SCORE: 47
ADLS_ACUITY_SCORE: 41
ADLS_ACUITY_SCORE: 47
ADLS_ACUITY_SCORE: 43
ADLS_ACUITY_SCORE: 47

## 2023-11-13 NOTE — PROGRESS NOTES
Calorie Count  Intake recorded for: 11/12  Total Kcals: 742 Total Protein: 35g  Kcals from Hospital Food: 262   Protein: 15g  Kcals from Outside Food (average):480 Protein: 20g  # Meals Ordered from Kitchen: 4  # Meals Recorded: 4    (Meal 1: 10% oatmeal with brown sugar, Greek yogurt, thickened cranberry juice)   (Meal 2: just supplements, see below)   (Meal 3: Nothing eaten)   (Meal 4: 75% Greek yogurt)  # Supplements Recorded: 100% 2 Boost Original (from home); 25% Gelatein Plus; 10% Magic Cup

## 2023-11-13 NOTE — PROGRESS NOTES
Vascular Surgery Progress Note  11/13/2023       Subjective:  Denies pain, resting comfortably in bed, asks appropriate questions.     Objective:  Temp:  [97.7  F (36.5  C)-98.2  F (36.8  C)] 97.7  F (36.5  C)  Pulse:  [84-97] 97  Resp:  [14-24] 18  BP: (114-145)/(75-89) 134/89  SpO2:  [97 %-100 %] 100 %    I/O last 3 completed shifts:  In: 838.42 [P.O.:300; I.V.:108.42; NG/GT:320]  Out: 1615 [Urine:1565; Drains:50]      Gen: resting comfortably in bed, answering questions appropriately, no delirium/confusion, whispering words, not in acute distress  CV: regular rate and rhythm per tele  Resp: non-labored, on room air   Abd: Abdominal incision with dressing, c/d/I.  Ext: RLE wwp, palpable DP pulse, LLE stump incision nonerythematous  Prevena LLE placed on 11/13/23 - to be replaced in 5-7 days  Skin: Blanching erythema resolved.     Labs:  Recent Labs   Lab 11/13/23  0546 11/12/23  0536 11/11/23  0554   WBC 6.7 6.1 6.5   HGB 8.1* 7.8* 7.5*   * 124* 136*       Recent Labs   Lab 11/13/23  0546 11/12/23  1308 11/12/23  0536 11/11/23  0554   * 133* 134* 133*   POTASSIUM 4.8 5.1 5.5* 5.5*   CHLORIDE 101 101 103 101   CO2 21* 21* 21* 22   BUN 58.1* 56.7* 56.5* 59.2*   CR 1.85* 1.90* 1.95* 1.99*   * 121* 117* 120*   OMAR 9.5 9.4 9.3 9.1   MAG 2.0  --  2.1 2.0   PHOS 5.3*  --  5.3* 5.1*      Assessment/Plan:   Alfredo Burnham is a 68 year old male with PMH of HTN and previous TIA who presented with R sided abdominal pain found to have contained ruptured AAA, now s/p open AAA repair 9/24 into 9/25am with 30 min supraceliac clamp time, prolonged inter-renal clamp time, temporary abdominal closure. He did have bloody stool postop with flex sig 9/25 demonstrating ischemic mucosal changes of the rectum, repeated abdominal without evidence of transmural necrosis of the small or large intestine, or the rectum. On 9/29 he was started on CRRT and subsequently iHD. S/p closure of abdominal fascia and  subcutaneous tissue wound VAC applied 10/2/23. 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 On 10/20/23 underwent abdominal incision closure.     10/24/23 found to have hgb 5.8, CTA demonstrated left psoas muscle hematoma now size 6 cm x 3 cm x 3 cm, barely seen on CTA from 10/13. Lesser sac of the peritoneal cavity collection stable, hematoma in left iliac stable, no active extravasation. Hep gtt held.     Overnight 10/24-10/25 patient more lethargic with fevers at 102.1 thought to be strokelike symptoms, stroke code called, workup negative. Hemoglobin dropped again to 5.8, despite holding heparin for 24 hours. Repeat CTA: demonstrated hematoma in LLE stump, with otherwise stable left iliac hematoma and stable left psoas muscle hematoma. Now resoled: patient's hgb stable, he is progressing well with continued improvements. Off iHD with renal recovery, tunneled line removed and cleared by SLP for diet. Improving PO intake.     Neuro:   - Seroquel at bedtime and as needed for anxiety/delirium episodes  - Previously had stroke code called for unequal pupils, c/f facial droop 10/6: workup revealed: Numerous, punctate late hyperacute to acute infarctions thought to be embolic in nature. No embolic source identified in workup.  - Follow-up with neurology 4-6 weeks after discharge, ok with a/c.     HEENT:   - ENT consult for ongoing dysphonia/dysphagia: endoscopy with L vocal cord paralysis. S/p bedside vocal fold injection 11/1/23 with otolaryngology. Continues to have dysphonia/dysphagia. Will re-engage ENT if no improvement.    CV:   - Amlodipine 10mg daily (at home on amlodipine 5mg and benazepril 20mg). No benzepril for now. Added Metoprolol, up to 100mg PO tolerating well.    - Goal SBP <160, MAP >65  - Labetalol prn for SBP >160  - Continue statin  - PE+, venous duplex with nonocclusive to occlusive thrombus of the left GSV from the level of the knee to the groin and nonocclusive thrombus of the left distal femoral vein and  popliteal veins, increased in extent compared to 9/30/2023.   - Heparin drip transitioned to Eliquis 5mg BID 11/13/23.     Resp:   - On room air    GI:   - Concern for Pancreatitis with peripancreatic fluid collection on CT 10/13 with question of bleeding into the collection. GI signed off as collection not drainable. - Improved appearance on repeat CT 10/25. Monitoring.    - TF tolerating via NJT, nocturnal feeds: addition of Relizorb per pt showing signs of pancretic insufficiency and changing the TF formulaa  - Nutrition following, appreciate recommendations, on calorie count: Monitor ability for pt to consume at least ~1250 Kcals and ~65 gm protein before considering removal of FT (65% est needs)   - When stool consistency firms, will order fecal elastase levels to officially check for pancreatic insufficiency.  - Cleared by SLP For IDDSI level 6 soft and bite sized diet with level 3 moderately thickened liquids (NO straws, 1:1 observation, patient is blind, must be sitting upright, alert). FEES ordered for this morning due to silent aspirations and continued dysphonia/dysphagia despite ENT injection of vocal folds.   - Please assist with meals as much as possible due to patient vision impairment   - Change abd dressing daily  - Loose stools -- added psyllium daily and imodium 4x/day as needed, resolved.     FEN:   - Electrolyte replacement prn     Renal:   - Cr plateau at 1.9-2 Continues to make good amount of urine.   - Last iHD 10/27.  - Tunneled line removed with IR 11/2/23, nephrology signed off  - Received lasix for elevated K with great response. Hyperkalemia resolved.     Heme:   - Hgb 8.1  - DVT as above, PE   - Heparin drip transitioned to Eliquis this morning 5mg BID 11/13/23.     ID:   -No further fevers, no leukocytosis. Patient has allergy to cefepime (hives). Continue to monitor for fevers, leukocytosis.  - monitor signs of pneumonia  - nystatin swish for oral candida    MSK:   - L AKA 10/17/23:  prevena applied 10/30/23  - ACE wrap on L AKA at all times  - Orthotics provided stump protector and  11/1/23    Derm:   - Rash significantly improved, not cellulitis. Pharm reviewed meds, all low risk, derm consulted, ID has seen as well, all felt were likely drug induced presumably from cefepime  - Did receive 1 dose cefepime 10/25 - this may be cause of worsening. May take 1-2 weeks to clear up.  - Appreciate dermatology recs, likely morbilliform eruption vs DRESS (low risk)    - triamcinolone 0.1% changed back to BID scheduled     Misc:   - Pediatric/Small tubes for blood draws (spoke with lab, included this request in daily lab draws)  - Appreciate aggressive PT/OT ROM, patient severely deconditioned and requires extensive physical therapy: Okay with lifting the equivalent of a gallon of water. Restrict utilizing upper extremities for lifting himself, transfers with upper extremities to chair, etc until 11/16/23 after which can be liberalized.  - Abd binder on at all times.  - Please keep LLE AKA wrapped with ACE.  - IMC status.  - Anticipate ARU discharge in the next 1-2 days (SW engaged). PMR recommends ARU        Addendum: Spoke with wife and patient this afternoon, they wanted NJT removed, noted they are committed to increasing patient's intake and they will make an effort on that and. NJT removed, spoke with nutritionist on this and communicated with  that patient is ready for discharge.    Discussed patient with Dr. Lowe, vascular surgery staff.    Miryam Carrasco, [facility]  Vascular Surgery  Pager: [phone]  [email or web address]  Send message or 10 digit call back number Securely via [employer] with the Vocera Web Console (learn more here)

## 2023-11-13 NOTE — CONSULTS
Patient does not have Part D.    If the patient is enrolled in and receiving health care from the [facility], he may have his [facility] health care provider send his prescriptions to the [facility].  Formulary medications are covered for $5-$8/mo for generics and $11/mo for brands.  The pharmacy can fill up to 90 days at a time, and they offer mail order option as well.    Xarelto, Eliquis, and Pradaxa are all on the [facility] formulary.    Upon receipt of RX, Discharge Pharmacy can provide one month free.    Cash price if patient chooses to get subsequent fills at a retail pharmacy is $513/mo.     Jantoven (warfarin)  On the [facility] formulary.  Cash price if patient chooses to get subsequent fills at a retail pharmacy is $15/mo.    Iraida Rowe  Pharmacy Technician/Liaison, Discharge Pharmacy   [phone] (voice or text)  [email or web address]

## 2023-11-13 NOTE — PLAN OF CARE
Neuro: A&Ox4. Intermittent confusion with forgetfulness. Flat.  Follows commands.  Cardiac: SR. Telemetry discontinued. VSS. Declines chest pain.  Respiratory: Sating above 90% on RA.  GI/: Adequate urine output via bedside urinal.  Diet/appetite: Tolerating diet-FEES completed this AM-continue with diet ordered.  soft & bite-sized (level 6);moderately thick liquids/liquidized (level 3) w/ 1:1 supervision. Encourage oral intake. ND pulled by Luna Witt APRN CNP  Activity:  Assist of [facility] lift up to chair 1x. PT/OT following patient.  Pain: At acceptable level on current regimen. Intermittent pain noted to LLE.  Scheduled tylenol given with relief noted.  Skin: No new deficits noted. Abd incision with dressing C/D/I  LDA's: Right PIV-SL and Left PIV-SL  Transfer orders in place    Plan: Continue with POC. Notify primary team with changes.

## 2023-11-13 NOTE — PLAN OF CARE
Neuro: A&Ox 4, patient forgetful at times.   Cardiac: SR. HR 80-90's. -140/70-80's. Afebrile.    Respiratory: Sating > 92% on RA. Infrequent weak coughing.  GI/: Adequate urine output via Urinal. BM x1, stool sample sent.  Diet/appetite: Tolerating Soft and bite sized diet with moderately thick liquids. Eating poor. Juvencio counts through 11/13. TF from 8p-8a @ 55mL/hr, 30nL FWF Q4 with 2 Relizorb filters.  Activity:  Lift assist, patient not OOB this shift.  Pain: Patient reported no pain this shift, scheduled tylenol given.  Skin: No new deficits noted.   LDA's: ND tube. R PIV. L PIV. Wound vac LLE. Abdominal incision. L wrist skin tear.    Plan: Continue with POC. Notify primary team with changes.

## 2023-11-13 NOTE — PROGRESS NOTES
Vascular Surgery Progress Note  11/12/2023       Subjective:  Creatinine remains in the 1.9-2 range.. Hyperkalemic at 5.5. He reports he is trying to eat, although he states he doesn't like the food here. Doesn't think he could eat a whole meal. Requesting sugar free ensure as he feels he has been taking too much sugar in. Discussed with him that it is okay in this time of healing to take in more sugar than he would prior to his hospitalization. Denies fever, chills.     Objective:  Temp:  [97.7  F (36.5  C)-98.8  F (37.1  C)] 97.7  F (36.5  C)  Pulse:  [84-99] 96  Resp:  [13-24] 14  BP: (118-151)/(73-86) 134/82  SpO2:  [97 %-100 %] 97 %    I/O last 3 completed shifts:  In: 440 [P.O.:200; NG/GT:240]  Out: 2420 [Urine:2290; Drains:130]      Gen: resting comfortably in bed, answering questions appropriately, no delirium/confusion, whispering words, not in acute distress  CV: regular rate and rhythm per tele  Resp: non-labored, on room air   Abd: Abdominal incision with dressing, c/d/I.  Ext: RLE wwp, palpable DP pulse, LLE stump incision nonerythematous  Prevena LLE placed on 11/06/23 - to be replaced in 5-7 days  Skin: Blanching erythema resolved.     Labs:  Recent Labs   Lab 11/12/23  0536 11/11/23  0554 11/10/23  0354   WBC 6.1 6.5 6.0   HGB 7.8* 7.5* 7.6*   * 136* 141*       Recent Labs   Lab 11/12/23  1308 11/12/23  0536 11/11/23  0554 11/10/23  0354   * 134* 133* 136   POTASSIUM 5.1 5.5* 5.5* 5.1   CHLORIDE 101 103 101 103   CO2 21* 21* 22 20*   BUN 56.7* 56.5* 59.2* 60.0*   CR 1.90* 1.95* 1.99* 2.01*   * 117* 120* 117*   OMAR 9.4 9.3 9.1 9.0   MAG  --  2.1 2.0 2.1   PHOS  --  5.3* 5.1* 5.3*      Assessment/Plan:   Alfredo Burnham is a 68 year old male with PMH of HTN and previous TIA who presented with R sided abdominal pain found to have contained ruptured AAA, now s/p open AAA repair 9/24 into 9/25am with 30 min supraceliac clamp time, prolonged inter-renal clamp time, temporary  abdominal closure. He did have bloody stool postop with flex sig 9/25 demonstrating ischemic mucosal changes of the rectum, repeated abdominal without evidence of transmural necrosis of the small or large intestine, or the rectum. On 9/29 he was started on CRRT and subsequently iHD. S/p closure of abdominal fascia and subcutaneous tissue wound VAC applied 10/2/23. 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 On 10/20/23 underwent abdominal incision closure.     10/24/23 found to have hgb 5.8, CTA demonstrated left psoas muscle hematoma now size 6 cm x 3 cm x 3 cm, barely seen on CTA from 10/13. Lesser sac of the peritoneal cavity collection stable, hematoma in left iliac stable, no active extravasation. Hep gtt held.     Overnight 10/24-10/25 patient more lethargic with fevers at 102.1 thought to be strokelike symptoms, stroke code called, workup negative. Hemoglobin dropped again to 5.8, despite holding heparin for 24 hours. Repeat CTA: demonstrated hematoma in LLE stump, with otherwise stable left iliac hematoma and stable left psoas muscle hematoma. Now resoled: patient's hgb stable, he is progressing well with continued improvements. Off iHD with renal recovery, tunneled line removed and cleared by SLP for diet. With poor PO intake.     Neuro:   - Seroquel at bedtime and as needed for anxiety/delirium episodes  - Previously had stroke code called for unequal pupils, c/f facial droop 10/6: workup revealed: Numerous, punctate late hyperacute to acute infarctions thought to be embolic in nature. No embolic source identified in workup.  - Follow-up with neurology 4-6 weeks after discharge, ok with a/c.     HEENT:   - ENT consult for ongoing dysphonia/dysphagia: endoscopy with L vocal cord paralysis. S/p bedside vocal fold injection 11/1/23 with otolaryngology. Continues to have dysphonia/dysphagia. Will re-engage ENT Monday if no improvement.    CV:   - Amlodipine 10mg daily (at home on amlodipine 5mg and benazepril 20mg) due  to SBP>160 requiring Labetalol IV. No benzepril for now. Added Metoprolol, up to 100mg PO tolerating well.    - Goal SBP <160, MAP >65  - Labetalol prn for SBP >160  - Continue statin  - PE+, venous duplex with nonocclusive to occlusive thrombus of the left GSV from the level of the knee to the groin and nonocclusive thrombus of the left distal femoral vein and popliteal veins, increased in extent compared to 9/30/2023.   - Heparin drip low intensity, with therapeutic anti-Xa, no plan to change to high intensity, appropriately anticoagulated. Will transition to DOAC the day before discharge, will remain on heparin drip until the day before transfer to rehab.    Resp:   - On room air, CXR clear    GI:   - Concern for Pancreatitis with peripancreatic fluid collection on CT 10/13 with question of bleeding into the collection. GI signed off as collection not drainable. - Improved appearance on repeat CT 10/25. Monitoring.    - TF tolerating via NJT, nocturnal feeds: addition of Relizorb per pt showing signs of pancreatic insufficiency and changing the TF formula  - Nutrition following, appreciate recommendations, on calorie count: Monitor ability for pt to consume at least ~1250 Kcals and ~65 gm protein before considering removal of FT (65% est needs)   - When stool consistency firms, will order fecal elastase levels to officially check for pancreatic insufficiency. Holding off on this at present, as watery stool sample will result in falsely diluted levels (less accurate).  - Cleared by SLP For IDDSI level 6 soft and bite sized diet with level 3 moderately thickened liquids (NO straws, 1:1 observation, patient is blind, must be sitting upright, alert). FEES ordered for Monday due to silent aspirations and continued dysphonia/dysphagia despite ENT injection of vocal folds.   - Please assist with meals as much as possible due to patient vision impairment   - Change abd dressing daily  - Loose stools -- added psyllium  daily and imodium 4x/day as needed. C. difficile negative.    FEN:   - Electrolyte replacement prn     Renal:   - Cr plateau at 1.9-2 Continues to make good amount of urine.   - Last iHD 10/27.  - Tunneled line removed with IR 11/2/23, nephrology signed off  - Potassium elevated 5.5 past 2 days, okay response to lasix 20mg IV - down to 5.1.   - Will continue to monitor, consider nephrology reconsult if continues to increase    Heme:   - Hgb 7.7  - DVT as above, PE   - Heparin drip low intensity, with therapeutic anti-Xa. No plan to transition to high intensity, appropriately anticoagulated. Will transition to DOAC the day prior to discharge.    ID:   -No further fevers, no leukocytosis. Patient has allergy to cefepime (hives). Continue to monitor for fevers, leukocytosis.  - monitor signs of pneumonia  - nystatin swish for oral candida    MSK:   - L AKA 10/17/23: prevena applied 10/30/23, to stay on for 5 to 7 days  - ACE wrap on L AKA at all times  - Orthotics provided stump protector and  11/1/23    Derm:   - Rash significantly improved, not cellulitis. Pharm reviewed meds, all low risk, derm consulted, ID has seen as well, all felt were likely drug induced presumably from cefepime  - Did receive 1 dose cefepime 10/25 - this may be cause of worsening. May take 1-2 weeks to clear up.  - Appreciate dermatology recs, likely morbilliform eruption vs DRESS (low risk)    - triamcinolone 0.1% changed back to BID scheduled     Misc:   - Pediatric/Small tubes for blood draws (spoke with lab, included this request in daily lab draws)  - Appreciate aggressive PT/OT ROM, patient severely deconditioned and requires extensive physical therapy: Okay with lifting the equivalent of a gallon of water. Restrict utilizing upper extremities for lifting himself, transfers with upper extremities to chair, etc until 11/16/23 after which can be liberalized.  - Abd binder on at all times.  - Please keep LLE AKA wrapped with ACE.  -  IM status.  - Anticipate will require ARU at discharge (SW engaged). PMR recommends ARU, potentially next week.        Discussed patient with Dr. Larson, vascular surgery staff.    Will Donald MD   Vascular Surgery PGY3

## 2023-11-13 NOTE — PROGRESS NOTES
11/13/23 0951   Appointment Info   Signing Clinician's Name / Credentials (SLP) Ida Paez MS, CCC-SLP   General Information   Onset of Illness/Injury or Date of Surgery 09/24/23   Referring Physician Miryam Carrasco APRN CNP   Patient/Family Therapy Goal Statement (SLP) family wants feeding tube removed   Pertinent History of Current Problem Pt  is a 68 year old male with PMH of HTN and previous TIA who presented with R sided abdominal pain found to have contained ruptured AAA, now s/p open AAA repair 9/24 into 9/25am with 30 min supraceliac clamp time, prolonged inter-renal clamp time, temporary abdominal closure.   General Observations Pt awake and alert-- pleasant and cooperative throughout exam   Type of Evaluation   Type of Evaluation Swallow Evaluation   General Swallowing Observations   Respiratory Support room air   Current Diet/Method of Nutritional Intake (General Swallowing Observations, NIS) moderately thick (honey-thick) liquids (level 3);soft and bite-sized (dysphagia advanced) (level 6)   Swallowing Evaluation Fiberoptic Endoscopic Evaluation of Swallowing (FEES)   FEES Eval   Signs/Symptoms Noted Pt with aphonia and known hypomobile L VF from prior ENT exam. Repeat imaging recommended to assess laryngeal/pharyngeal function related to swallowing   Type of Scope Adult   Scope Serial Number [id number]   Nares Entry nostril entered using no topical anesthetic   Nares Passage Scope passed though right nares   NG Tube/Nasal O2 cannula   (NG tube present on left. Unable to pass scope along TF due to dried nasal mucus)   Asymmetry of Anatomy upon entry;upon command  (edema noted throughout larynx and asymmetrical movement of VF observed with voicing and cough attempts)   Location of Secretions throughout pharynx. Coughed up from larynx with cued cough   Amount of Secretions moderate; thick, sticky   FEES Textures Trialed thin liquids;mildly thick liquids;moderately thick liquids/liquidized;puree  textures   FEES Eval: Thin Liquid Texture Trial   Mode of Presentation, Thin Liquid spoon  (ice chip x2; fed by rehab tech)   Pre-swallow secretions noted Valleculae;Pyriforms;Posterior pharyngeal wall   Post swallow residuals Valleculae;Pyriforms;Posterior pharygeal;Cricopharyngeal area   Location of Pre-spillage Valleculae;Bilaterally around epiglottis into pyriforms   Location of residuals Spillage through interarytenoid space   Pre-spillage Yes   Penetration? Yes   Aspiration? Yes   Epiglottic inversion Yes   Residuals in laryngeal vestibule Yes   Location of Penetration Through interarytenoid space   Penetration Occurence After swallow   Patient Response to Penetration Clears with cueing   Residuals Remain after multiple attempts to clear   Aspiration Occurrence After swallow   Aspiration Location Spillage through posterior commisure;Evident in trachea   Patient Response to Aspiration Absent;Cleared with cueing   FEES Eval: Mildly Thick Liquids    Mode of presentation spoon;straw  (fed by rehab tech)   Pre-swallow secretions noted Valleculae;Pyriforms;Laryngeal vestibule;Posterior pharyngeal wall   Post swallow residuals Valleculae;Pyriforms;Posterior pharygeal;Cricopharyngeal area   Location of Pre-spillage Bilaterally around epiglottis into pyriforms;Valleculae   Location of residuals Spillage through interarytenoid space   Pre-spillage Yes   Penetration? Yes   Aspiration? Yes   Epiglottic inversion Yes   Residuals in laryngeal vestibule Yes   Location of Penetration Through interarytenoid space   Penetration Occurrence After swallow   Patient Response to Penetration Clears with cueing   Residuals Remain after multiple attempts to clear   Aspiration Occurrence After swallow   Aspiration Location Spillage through posterior commisure   Patient Response to Aspiration Absent;Cleared with cueing   FEES Eval: Moderately Thick Liquids    Pre-swallow secretions noted Valleculae;Pyriforms;Laryngeal vestibule;Posterior  pharyngeal wall   Pre-spillage No   Penetration? No  (penetration was observed with swallow attempt with head turned to the left; no penetration/aspiration and reduced pharyngeal residuals noted without strategy)   Aspiration? No   Epiglottic inversion Yes   Residuals in laryngeal vestibule No   Mode of Presentation spoon;straw  (fed by rehab tech)   FEES Eval: Puree Solid Texture Trial   Mode of Presentation, Puree spoon  (fed by rehab tech)   Post swallow residuals Pyriforms   Pre-spillage No   Penetration? No   Aspiration? No   Epiglottic inversion Yes   Residuals in laryngeal vestibule No   Esophageal Phase of Swallow   Patient reports or presents with symptoms of esophageal dysphagia No   Swallowing Recommendations   Diet Consistency Recommendations soft & bite-sized (level 6);moderately thick liquids/liquidized (level 3)   Supervision Level for Intake 1:1 supervision needed   Mode of Delivery Recommendations bolus size, small;no straws;slow rate of intake   Postural Recommendations   (none; reduced tolerance of moderately thick liquids noted with attempted head turn to left)   Swallowing Maneuver Recommendations supraglottic swallow;throat clear-swallow   Monitoring/Assistance Required (Eating/Swallowing) stop eating activities when fatigue is present;monitor for cough or change in vocal quality with intake   Recommended Feeding/Eating Techniques (Swallow Eval) maintain upright sitting position for eating;minimize distractions during oral intake;provide assist with feeding;set-up and prepare tray   Medication Administration Recommendations, Swallowing (SLP) vis FT vs. as tolerated with moderately thick liquids   Instrumental Assessment Recommendations   (recommend repeat VFSS vs. FEES in 2-3 weeks pending progress)   General Therapy Interventions   Planned Therapy Interventions Dysphagia Treatment   Dysphagia treatment Oropharyngeal exercise training;Instruction of safe swallow strategies;Modified diet  education;Compensatory strategies for swallowing   Clinical Impression   Criteria for Skilled Therapeutic Interventions Met (SLP Eval) Yes, treatment indicated   SLP Diagnosis moderate-severe pharyngeal dysphagia   Risks & Benefits of therapy have been explained evaluation/treatment results reviewed;care plan/treatment goals reviewed;risks/benefits reviewed;current/potential barriers reviewed;participants voiced agreement with care plan;participants included;patient  (family)   Clinical Impression Comments FEES evaluation completed per MD order. Flexible scope passed via the right nares to sit above the soft palate and obtain a superior view of the pharynx, larynx, and UES. Significant thick, sticky secretions observed throughout pharynx at onset of eval. swelling noted throughout pharynx and laryngeal vestibule and reduced movement of L VF noted, which is consistent with dysphonia. Pt presented with trials of ice chips, mildly-thick liquids, moderately-thick liquids and purees. pre-spillage and significant pharyngeal residuals noted with ice chips and mildly-thick liquid trials. Post swallow residuals noted in posterior commisure and were aspirated post swallow. Pt does not sense this, but is able to partially clear with cues. Similar results noted with moderately thick liquids with attempt to turn head to left (to see if this would improve VF closure). Improved pharyngeal clearance with moderately-thick liquids with head facing forward (without use of head turn strategy). Swallow functional for moderately thick liquids and purees; reduced pharyngeal residuals noted and no penetration/aspiration observed.   SLP Total Evaluation Time   Eval: Flexible Fiberoptic Endoscopic Evaluation of Swallowing by Cine or Video Recording Minutes ([id number]) 15   SLP Discharge Planning   SLP Discharge Recommendation [facility]-Motivated patient will benefit from intensive, interdisciplinary therapy.  Anticipate will be able to  tolerate 3 hours of therapy per day   SLP Rationale for DC Rec Mild-moderate oropharyngeal dysphagia with modified diet   SLP Brief overview of current status  Recommend continue soft and bite sized diet with moderately thick liquids as tolerated. ok for free (thin) water and ice chips between meals only after thorough oral cares. Pt to have 1:1 supervision for all PO intake and take small bites/sips. Straws are okay. Voicing remains aphonic which is consistent with visualized VF movment-- L VF not coming to midline. Pt clearly stated during and after exam that he is not interested in a repeat VF injection, but agreeable to ENT involvement/monitoring   Total Session Time   Total Session Time (sum of timed and untimed services) 32

## 2023-11-14 ENCOUNTER — APPOINTMENT (OUTPATIENT)
Dept: PHYSICAL THERAPY | Facility: CLINIC | Age: 68
DRG: 268 | End: 2023-11-14
Payer: COMMERCIAL

## 2023-11-14 ENCOUNTER — APPOINTMENT (OUTPATIENT)
Dept: SPEECH THERAPY | Facility: CLINIC | Age: 68
DRG: 268 | End: 2023-11-14
Payer: COMMERCIAL

## 2023-11-14 ENCOUNTER — APPOINTMENT (OUTPATIENT)
Dept: OCCUPATIONAL THERAPY | Facility: CLINIC | Age: 68
DRG: 268 | End: 2023-11-14
Payer: COMMERCIAL

## 2023-11-14 LAB
ANION GAP SERPL CALCULATED.3IONS-SCNC: 12 MMOL/L (ref 7–15)
BUN SERPL-MCNC: 58.4 MG/DL (ref 8–23)
CALCIUM SERPL-MCNC: 9.6 MG/DL (ref 8.8–10.2)
CHLORIDE SERPL-SCNC: 101 MMOL/L (ref 98–107)
CREAT SERPL-MCNC: 1.89 MG/DL (ref 0.67–1.17)
DEPRECATED HCO3 PLAS-SCNC: 21 MMOL/L (ref 22–29)
EGFRCR SERPLBLD CKD-EPI 2021: 38 ML/MIN/1.73M2
ERYTHROCYTE [DISTWIDTH] IN BLOOD BY AUTOMATED COUNT: 16.5 % (ref 10–15)
GLUCOSE SERPL-MCNC: 108 MG/DL (ref 70–99)
HCT VFR BLD AUTO: 25 % (ref 40–53)
HGB BLD-MCNC: 8 G/DL (ref 13.3–17.7)
MAGNESIUM SERPL-MCNC: 2.1 MG/DL (ref 1.7–2.3)
MCH RBC QN AUTO: 30.9 PG (ref 26.5–33)
MCHC RBC AUTO-ENTMCNC: 32 G/DL (ref 31.5–36.5)
MCV RBC AUTO: 97 FL (ref 78–100)
PHOSPHATE SERPL-MCNC: 4.8 MG/DL (ref 2.5–4.5)
PLATELET # BLD AUTO: 131 10E3/UL (ref 150–450)
POTASSIUM SERPL-SCNC: 5.2 MMOL/L (ref 3.4–5.3)
RBC # BLD AUTO: 2.59 10E6/UL (ref 4.4–5.9)
SODIUM SERPL-SCNC: 134 MMOL/L (ref 135–145)
WBC # BLD AUTO: 8.1 10E3/UL (ref 4–11)

## 2023-11-14 PROCEDURE — 97530 THERAPEUTIC ACTIVITIES: CPT | Mod: GO

## 2023-11-14 PROCEDURE — 250N000013 HC RX MED GY IP 250 OP 250 PS 637: Performed by: STUDENT IN AN ORGANIZED HEALTH CARE EDUCATION/TRAINING PROGRAM

## 2023-11-14 PROCEDURE — 92526 ORAL FUNCTION THERAPY: CPT | Mod: GN

## 2023-11-14 PROCEDURE — 85027 COMPLETE CBC AUTOMATED: CPT | Performed by: NURSE PRACTITIONER

## 2023-11-14 PROCEDURE — 83735 ASSAY OF MAGNESIUM: CPT

## 2023-11-14 PROCEDURE — 97110 THERAPEUTIC EXERCISES: CPT | Mod: GP | Performed by: REHABILITATION PRACTITIONER

## 2023-11-14 PROCEDURE — 250N000013 HC RX MED GY IP 250 OP 250 PS 637: Performed by: NURSE PRACTITIONER

## 2023-11-14 PROCEDURE — 120N000002 HC R&B MED SURG/OB UMMC

## 2023-11-14 PROCEDURE — 250N000013 HC RX MED GY IP 250 OP 250 PS 637: Performed by: SURGERY

## 2023-11-14 PROCEDURE — 97530 THERAPEUTIC ACTIVITIES: CPT | Mod: GP | Performed by: REHABILITATION PRACTITIONER

## 2023-11-14 PROCEDURE — 84100 ASSAY OF PHOSPHORUS: CPT | Performed by: NURSE PRACTITIONER

## 2023-11-14 PROCEDURE — 250N000013 HC RX MED GY IP 250 OP 250 PS 637

## 2023-11-14 PROCEDURE — 80048 BASIC METABOLIC PNL TOTAL CA: CPT | Performed by: NURSE PRACTITIONER

## 2023-11-14 PROCEDURE — 250N000011 HC RX IP 250 OP 636: Mod: JZ | Performed by: STUDENT IN AN ORGANIZED HEALTH CARE EDUCATION/TRAINING PROGRAM

## 2023-11-14 PROCEDURE — 99024 POSTOP FOLLOW-UP VISIT: CPT | Performed by: NURSE PRACTITIONER

## 2023-11-14 PROCEDURE — 36415 COLL VENOUS BLD VENIPUNCTURE: CPT | Performed by: NURSE PRACTITIONER

## 2023-11-14 RX ORDER — ACETAMINOPHEN 325 MG/1
975 TABLET ORAL EVERY 6 HOURS
Status: DISCONTINUED | OUTPATIENT
Start: 2023-11-14 | End: 2023-11-17 | Stop reason: HOSPADM

## 2023-11-14 RX ORDER — SENNOSIDES 8.6 MG
2 TABLET ORAL 2 TIMES DAILY PRN
Status: DISCONTINUED | OUTPATIENT
Start: 2023-11-14 | End: 2023-11-16

## 2023-11-14 RX ORDER — TRIAMCINOLONE ACETONIDE 1 MG/G
OINTMENT TOPICAL 2 TIMES DAILY PRN
Status: DISCONTINUED | OUTPATIENT
Start: 2023-11-14 | End: 2023-11-17 | Stop reason: HOSPADM

## 2023-11-14 RX ORDER — ATORVASTATIN CALCIUM 10 MG/1
10 TABLET, FILM COATED ORAL EVERY EVENING
Status: DISCONTINUED | OUTPATIENT
Start: 2023-11-14 | End: 2023-11-17 | Stop reason: HOSPADM

## 2023-11-14 RX ORDER — HYDROCORTISONE 25 MG/G
OINTMENT TOPICAL 2 TIMES DAILY PRN
Status: DISCONTINUED | OUTPATIENT
Start: 2023-11-14 | End: 2023-11-17 | Stop reason: HOSPADM

## 2023-11-14 RX ADMIN — TRIAMCINOLONE ACETONIDE: 1 OINTMENT TOPICAL at 08:08

## 2023-11-14 RX ADMIN — ACETAMINOPHEN 975 MG: 325 TABLET, FILM COATED ORAL at 13:08

## 2023-11-14 RX ADMIN — APIXABAN 5 MG: 5 TABLET, FILM COATED ORAL at 19:38

## 2023-11-14 RX ADMIN — CALCIUM CARBONATE (ANTACID) CHEW TAB 500 MG 500 MG: 500 CHEW TAB at 19:39

## 2023-11-14 RX ADMIN — METOPROLOL TARTRATE 100 MG: 50 TABLET, FILM COATED ORAL at 07:50

## 2023-11-14 RX ADMIN — HYDROCORTISONE: 25 OINTMENT TOPICAL at 08:09

## 2023-11-14 RX ADMIN — PANCRELIPASE 2 CAPSULE: 24000; 76000; 120000 CAPSULE, DELAYED RELEASE PELLETS ORAL at 07:50

## 2023-11-14 RX ADMIN — CALCIUM CARBONATE (ANTACID) CHEW TAB 500 MG 500 MG: 500 CHEW TAB at 14:11

## 2023-11-14 RX ADMIN — ATORVASTATIN CALCIUM 10 MG: 10 TABLET, FILM COATED ORAL at 19:38

## 2023-11-14 RX ADMIN — Medication 60 ML: at 07:51

## 2023-11-14 RX ADMIN — APIXABAN 5 MG: 5 TABLET, FILM COATED ORAL at 07:50

## 2023-11-14 RX ADMIN — ACETAMINOPHEN 975 MG: 325 TABLET, FILM COATED ORAL at 16:38

## 2023-11-14 RX ADMIN — PSYLLIUM HUSK 1 PACKET: 3.4 POWDER ORAL at 07:50

## 2023-11-14 RX ADMIN — AMLODIPINE BESYLATE 10 MG: 10 TABLET ORAL at 08:25

## 2023-11-14 RX ADMIN — PANTOPRAZOLE SODIUM 40 MG: 40 TABLET, DELAYED RELEASE ORAL at 07:50

## 2023-11-14 RX ADMIN — QUETIAPINE FUMARATE 50 MG: 50 TABLET ORAL at 19:38

## 2023-11-14 RX ADMIN — METOPROLOL TARTRATE 100 MG: 50 TABLET, FILM COATED ORAL at 19:38

## 2023-11-14 RX ADMIN — Medication 50 MCG: at 07:51

## 2023-11-14 RX ADMIN — ONDANSETRON 4 MG: 2 INJECTION INTRAMUSCULAR; INTRAVENOUS at 23:46

## 2023-11-14 ASSESSMENT — ACTIVITIES OF DAILY LIVING (ADL)
ADLS_ACUITY_SCORE: 47

## 2023-11-14 NOTE — PROGRESS NOTES
CLINICAL NUTRITION SERVICES - REASSESSMENT NOTE     Nutrition Prescription    RECOMMENDATIONS FOR MDs/PROVIDERS TO ORDER:  None currently    Malnutrition Status:    Severe malnutrition in the context of acute illness/disease    Recommendations already ordered by Registered Dietitian (RD):  On calorie counts through 11/16  Change from expedite liquid to expedite cup for appropriate thickness now that patient does not have enteral access  Discontinued tube feeding and relizorb orders as no longer has enteral access   Provided handout with protein content of foods and daily protein goal.   Encouraged continued increased PO especially without feeding tube to supplement nutrition intake.   Adjusted supplements per patient preference: Gelatein BID. Continue Magic Cup BID. Discontinued Ensure as pt prefers Boost (original) which family is providing from home and RN is thickening.  Continue room service ordering assistance     Future/Additional Recommendations:  Monitor fecal elastase results     EVALUATION OF THE PROGRESS TOWARD GOALS   Diet: IDDSI L6 Soft & Bite Sized and IDDSI L3 Moderately Thick Liquids    Intake/Tolerance:   Juvencio counts:   11/13     Total Kcals: 1005     Total Protein: 55g   11/12     Total Kcals: 742       Total Protein: 35g   11/11     Total Kcals: 784       Total Protein: 49g   11/10     Total Kcals: 342       Total Protein: 16g   11/9       Total Kcals: 703       Total Protein: 54g   11/8       Total Kcals: 145       Total Protein: 5g     6 day average intake: 620 kcal/day and 35 g protein/day.     Nutrition Support:  -Recent Regimen(s):   10/31-11/3: TwoCal HN at 40 mL/hr (960 mL/day) + 1 pkt Banatrol TF + Expedite once daily   11/3 - 11/7:Two Juvencio HN at rate of 40 mL/hr x 14 hours/evening (0123-2291) + 1 pkt Banatrol TF + Expedite WH supplement --> Meets 70% low end Kcal needs, ~70% low end est protein needs   11/10-11/13: Vital 1.5 juvencio (or equivalent) at rate of 55 mL/hr x 12 hours/evening + 1  "Expedite wound healing --> Meets ~55% avg est Kcal needs, and ~57% est avg protein needs   ++ relizorb w/ cycled feeds     6 day average tube feeding infusion of 366 mL = average intake of 549 kcal/day and 25 g protein/day.  -- multiple days of likely inaccurate documentation of TF intakes per I/O        NEW FINDINGS   Nasal feeding tube removed 11/13 per family request per EMR. At visit with patient today writer inquired if patient felt getting nasal tube out has helped him with PO the past day, patient replied \"no, should it?\". Encouraged that hopefully he can continue to increase PO without option of nutrition support at this time. Encouraged continuing to bring in Boost and will continue MC and Gelatein.   SLP assessed swallow 11/13, continue to recommend L6/L3 diet.  Plan for ARU at discharge per EMR.     GI:  Last BM: 11/13/23  C dif negative 11/06  Fecal elastase ordered, results not back yet    Weight:  Most Recent Weight: 67.7 kg (149 lb 4 oz)  on 11/13/23 via Bed scale  Body mass index is 22.69 kg/m .  9 kg (11.7%) weight loss over 1 month.    Meds:  Creon 24 2 capsules TID with meals  Protonix  Metamucil daily  Vitamin D3 50 mcg daily  Expedite wound healing supplement  PRN imodium, zofran    Labs:  Vitamin D 18 (L) 11/07 11/11/23 05:54 11/12/23 05:36 11/12/23 13:08 11/13/23 05:46 11/14/23 06:08   Sodium 133 (L) 134 (L) 133 (L) 134 (L) 134 (L)   Potassium 5.5 (H) 5.5 (H) 5.1 4.8 5.2   Chloride 101 103 101 101 101   Carbon Dioxide (CO2) 22 21 (L) 21 (L) 21 (L) 21 (L)   Urea Nitrogen 59.2 (H) 56.5 (H) 56.7 (H) 58.1 (H) 58.4 (H)   Creatinine 1.99 (H) 1.95 (H) 1.90 (H) 1.85 (H) 1.89 (H)   GFR Estimate 35 (L) 36 (L) 37 (L) 39 (L) 38 (L)   Calcium 9.1 9.3 9.4 9.5 9.6   Anion Gap 10 10 11 12 12   Magnesium 2.0 2.1  2.0 2.1   Phosphorus 5.1 (H) 5.3 (H)  5.3 (H) 4.8 (H)   Glucose 120 (H) 117 (H) 121 (H) 119 (H) 108 (H)       Skin:  S/p AKA    MALNUTRITION  % Intake: < 75% for > 7 days (moderate)  % Weight Loss: " > 5% in 1 month (severe malnutrition)  Subcutaneous Fat Loss: Facial region:  mild, Upper arm:  moderate, and Lower arm:  moderate   Muscle Loss: Facial & jaw region:  mild, Upper arm (bicep, tricep):  moderate, and Lower arm  (forearm):  moderate  Fluid Accumulation/Edema: Moderate  Malnutrition Diagnosis: Severe malnutrition in the context of acute illness/disease    Previous Goals   Total avg nutritional intake to meet a minimum of 20 kcal/kg and 1 g PRO/kg daily (per dosing wt 72 kg).  Evaluation: Not met    Pt to consume at least ~1250 Kcals and ~65 gm protein before considering removal of FT (65% est needs)   Evaluation: Not met    Previous Nutrition Diagnosis  Inadequate oral intake related to decreased appetite, dysphagia as evidenced by minimal PO intake, cyclic TFs providing majority of nutrition at present.     Evaluation: No change    CURRENT NUTRITION DIAGNOSIS  Inadequate oral intake related to decreased appetite, dysphagia, increased needs for wound healing as evidenced by calorie counts, patient report, chart review.     INTERVENTIONS  Implementation  Medical food supplement therapy     Goals  Total avg nutritional intake to meet a minimum of 25 kcal/kg and 1.2 g PRO/kg daily (per dosing wt 72 kg).    Monitoring/Evaluation  Progress toward goals will be monitored and evaluated per protocol.      Amarilys Gandara, IVET, LD    6C (beds 9617-6726) + 7C (beds 4347-4955) + ED   RD pager: [phone]  Weekend/Holiday RD pager: [phone]

## 2023-11-14 NOTE — PROGRESS NOTES
Calorie Count  Intake recorded for: 11/13  Total Kcals: 1005 Total Protein: 55g  Kcals from Hospital Food: 523   Protein: 35g  Kcals from Outside Food (average):480 Protein: 20g  # Meals Ordered from Kitchen: 3  # Meals Recorded: 3   (Meal 1: only supplements, see below)   (Meal 2: 10% tomato soup)   (Meal 3: only supplements, see below)  # Supplements Recorded: 100% 2 Boost Original (from home); 75% 1 Gelatein Plus; 50% 2 Magic Cups (1 total); 10% 1 Magic Cup

## 2023-11-14 NOTE — PLAN OF CARE
Goal Outcome Evaluation:      Plan of Care Reviewed With: patient    Overall Patient Progress: no changeOverall Patient Progress: no change    Outcome Evaluation: AOx4, able to make needs known. L and R PIV WDL. Soft and bite sized diet with thickened liquids, tolerating. L AKA with wound vac in place, low pain but declining pain meds. Not OOB this shift, PT following. Haircut today at 1400 (please time with PT). Will cotninue to monitor and follow POC.

## 2023-11-14 NOTE — PROGRESS NOTES
"Vascular Surgery Progress Note  11/14/2023       Subjective:  Feeling well this morning, slept \"a lot\" overnight per patient. NJT removed yesterday afternoon per family request, committed to increase p.o. intake.    Objective:  Temp:  [96.5  F (35.8  C)-98.5  F (36.9  C)] 98.1  F (36.7  C)  Pulse:  [] 104  Resp:  [16-18] 18  BP: (112-146)/(70-90) 126/78  SpO2:  [88 %-100 %] 99 %    I/O last 3 completed shifts:  In: 617 [P.O.:437; NG/GT:180]  Out: 1710 [Urine:1700; Drains:10]      Gen: resting comfortably in bed, answering questions appropriately, no delirium/confusion, whispering words, not in acute distress  CV: regular rate and rhythm per tele  Resp: non-labored, on room air   Abd: Abdominal incision with dressing, c/d/I.  Ext: RLE wwp, palpable DP pulse, LLE stump incision nonerythematous  Prevena LLE placed on 11/13/23 - to be replaced in 7 days  Skin: Blanching erythema resolved.     Labs:  Recent Labs   Lab 11/14/23  0608 11/13/23  0546 11/12/23  0536   WBC 8.1 6.7 6.1   HGB 8.0* 8.1* 7.8*   * 131* 124*       Recent Labs   Lab 11/14/23  0608 11/13/23  0546 11/12/23  1308 11/12/23  0536   * 134* 133* 134*   POTASSIUM 5.2 4.8 5.1 5.5*   CHLORIDE 101 101 101 103   CO2 21* 21* 21* 21*   BUN 58.4* 58.1* 56.7* 56.5*   CR 1.89* 1.85* 1.90* 1.95*   * 119* 121* 117*   OMAR 9.6 9.5 9.4 9.3   MAG 2.1 2.0  --  2.1   PHOS 4.8* 5.3*  --  5.3*      Assessment/Plan:   Alfredo Burnham is a 68 year old male with PMH of HTN and previous TIA who presented with R sided abdominal pain found to have contained ruptured AAA, now s/p open AAA repair 9/24 into 9/25am with 30 min supraceliac clamp time, prolonged inter-renal clamp time, temporary abdominal closure. He did have bloody stool postop with flex sig 9/25 demonstrating ischemic mucosal changes of the rectum, repeated abdominal without evidence of transmural necrosis of the small or large intestine, or the rectum. On 9/29 he was started on CRRT and " subsequently iHD. S/p closure of abdominal fascia and subcutaneous tissue wound VAC applied 10/2/23. 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 On 10/20/23 underwent abdominal incision closure.     10/24/23 found to have hgb 5.8, CTA demonstrated left psoas muscle hematoma now size 6 cm x 3 cm x 3 cm, barely seen on CTA from 10/13. Lesser sac of the peritoneal cavity collection stable, hematoma in left iliac stable, no active extravasation. Hep gtt held.     Overnight 10/24-10/25 patient more lethargic with fevers at 102.1 thought to be strokelike symptoms, stroke code called, workup negative. Hemoglobin dropped again to 5.8, despite holding heparin for 24 hours. Repeat CTA: demonstrated hematoma in LLE stump, with otherwise stable left iliac hematoma and stable left psoas muscle hematoma. Now resoled: patient's hgb stable, he is progressing well with continued improvements. Off iHD with renal recovery, tunneled line removed and cleared by SLP for diet. Improving PO intake, NJT removed.     Neuro:   - Seroquel at bedtime and as needed for anxiety/delirium episodes  - Previously had stroke code called for unequal pupils, c/f facial droop 10/6: workup revealed: Numerous, punctate late hyperacute to acute infarctions thought to be embolic in nature. No embolic source identified in workup.  - Follow-up with neurology 4-6 weeks after discharge, ok with a/c.     HEENT:   - ENT consult for ongoing dysphonia/dysphagia: endoscopy with L vocal cord paralysis. S/p bedside vocal fold injection 11/1/23 with otolaryngology. Continues to have dysphonia/dysphagia. ENT paged this morning for potential new recommendations.    CV:   - Amlodipine 10mg daily (at home on amlodipine 5mg and benazepril 20mg). No ACE for now. Metoprolol, up to 100mg PO BID, tolerating well.    - Goal SBP <160, MAP >65  - Labetalol prn for SBP >160  - Continue statin  - PE+, venous duplex with nonocclusive to occlusive thrombus of the left GSV from the level of the  knee to the groin and nonocclusive thrombus of the left distal femoral vein and popliteal veins, increased in extent compared to 9/30/2023.   - Heparin drip transitioned to Eliquis 5mg BID 11/13/23. (will need [facility] for cost effective co-pays)    Resp:   - On room air    GI:   - Concern for Pancreatitis with peripancreatic fluid collection on CT 10/13 with question of bleeding into the collection. GI signed off as collection not drainable. - Improved appearance on repeat CT 10/25. Monitoring.    - Removed NJT 11/13/23 per family request, patient is committing to increase p.o. intake to achieve optimal caloric including high-protein intake.  - Nutrition following, appreciate recommendations, on calorie count.   - When stool consistency firms, will order fecal elastase levels to officially check for pancreatic insufficiency.  - Cleared by SLP For IDDSI level 6 soft and bite sized diet with level 3 moderately thickened liquids (NO straws, 1:1 observation, patient is blind, must be sitting upright, alert). FEES ordered for this morning due to silent aspirations and continued dysphonia/dysphagia despite ENT injection of vocal folds.   - Please assist with meals as much as possible due to patient vision impairment   - Change abd dressing daily  - Loose stools, resolved.     FEN:   - Electrolyte replacement prn     Renal:   - Cr 1.89, Continues to make good amount of urine.   - Last iHD 10/27, tunneled line removed 11/2/23, nephrology signed off  - Received lasix for elevated K with great response. Hyperkalemia resolved.     Heme:   - Hgb stable  - DVT as above, PE   - Heparin drip transitioned to Eliquis this morning 5mg BID 11/13/23.     ID:   -No further fevers, no leukocytosis. Patient has allergy to cefepime (hives). Continue to monitor for fevers, leukocytosis.    MSK:   - L AKA 10/17/23: prevena applied 10/30/23  - ACE wrap on L AKA at all times  - Orthotics provided stump protector and   11/1/23    Derm:   - Rash improved, was secondary to Cefepime.    Misc:   - Pediatric/Small tubes for blood draws (spoke with lab, included this request in daily lab draws)  - Appreciate aggressive PT/OT ROM, patient severely deconditioned and requires extensive physical therapy: Okay with lifting the equivalent of a gallon of water. Restrict utilizing upper extremities for lifting himself, transfers with upper extremities to chair, etc until 11/16/23 after which can be liberalized.  - Abd binder on at all times.  - Please keep LLE AKA wrapped with ACE.  - ARU discharge ready since 11/13/23. PMR recommends ARU        Discussed patient with Dr. Lowe.    Miryam Carrasco, [facility]  Vascular Surgery  Pager: [phone]  [email or web address]  Send message or 10 digit call back number Securely via [employer] with the [employer] Web Console (learn more here)

## 2023-11-14 NOTE — PROGRESS NOTES
Admission/Transfer from: 6B   2 RN skin assessment completed. Yes, assessed with Madison ALEXANDRA RN   Significant findings include: L AKA, midline abd incision   WOC Nurse Consult Ordered? No

## 2023-11-14 NOTE — PROGRESS NOTES
Transfer  Transferred to: 7c  Via:bed  Reason for transfer:Pt no longer appropriate for 6B- improved patient condition  Family: Aware of transfer  Belongings: Packed and sent with pt  Chart: Delivered with pt to next unit  Medications: Meds sent to new unit with pt  Report given to: Naima SR RN on 7c  Pt status: Stable

## 2023-11-14 NOTE — PLAN OF CARE
Goal Outcome Evaluation:      Plan of Care Reviewed With: patient, family    Overall Patient Progress: improvingOverall Patient Progress: improving    Outcome Evaluation: see RD note 11/14. PO slighly improved but remains inadequate.    Amarilys Gandara RDN, LD    6C (beds 9858-9690) + 7C (beds 8718-4309) + ED   RD pager: [phone]  Weekend/Holiday RD pager: [phone]

## 2023-11-14 NOTE — PROGRESS NOTES
Care Management Follow Up    Length of Stay (days): 51    Expected Discharge Date: 11/15/2023     Concerns to be Addressed: all concerns addressed in this encounter     Patient plan of care discussed at interdisciplinary rounds: Yes    Anticipated Discharge Disposition:  [facility]     Anticipated Discharge Services:  Rehabilitation services, transportation services  Anticipated Discharge DME:  Prevena    Patient/family educated on Medicare website which has current facility and service quality ratings:  yes  Education Provided on the Discharge Plan:  yes  Patient/Family in Agreement with the Plan:  yes    Referrals Placed by CM/SW:      [facility]  [address], [place] [address]  Ph: [phone]  11/14: No bed today. SW will continue to follow up throughout the week    Private pay costs discussed: Not applicable    Additional Information:  LIZBET spoke with the primary team provider who reported to LIZBET that the day of discharge the provider will put a different Prevena on the patient and that one will remain in place for 7 days. The facility will not need to do anything with it.    LIZBET followed up with Katalina Tapia regarding ARC bed availability. She reported to SW that there is not a bed available for the patient today.     LIZBET will continue to follow for discharge needs and placement.     SARIAH Heller  Unit 7C   Office: [phone]  Pager: [phone]  [email or web address]

## 2023-11-15 ENCOUNTER — APPOINTMENT (OUTPATIENT)
Dept: OCCUPATIONAL THERAPY | Facility: CLINIC | Age: 68
DRG: 268 | End: 2023-11-15
Payer: COMMERCIAL

## 2023-11-15 LAB
ANION GAP SERPL CALCULATED.3IONS-SCNC: 11 MMOL/L (ref 7–15)
BUN SERPL-MCNC: 56.5 MG/DL (ref 8–23)
CALCIUM SERPL-MCNC: 10.2 MG/DL (ref 8.8–10.2)
CHLORIDE SERPL-SCNC: 100 MMOL/L (ref 98–107)
CREAT SERPL-MCNC: 1.93 MG/DL (ref 0.67–1.17)
DEPRECATED HCO3 PLAS-SCNC: 21 MMOL/L (ref 22–29)
EGFRCR SERPLBLD CKD-EPI 2021: 37 ML/MIN/1.73M2
ELASTASE PANC STL-MCNT: 358 UG/G
ERYTHROCYTE [DISTWIDTH] IN BLOOD BY AUTOMATED COUNT: 16.1 % (ref 10–15)
GLUCOSE SERPL-MCNC: 132 MG/DL (ref 70–99)
HCT VFR BLD AUTO: 26.2 % (ref 40–53)
HGB BLD-MCNC: 8.3 G/DL (ref 13.3–17.7)
MAGNESIUM SERPL-MCNC: 2.1 MG/DL (ref 1.7–2.3)
MCH RBC QN AUTO: 31 PG (ref 26.5–33)
MCHC RBC AUTO-ENTMCNC: 31.7 G/DL (ref 31.5–36.5)
MCV RBC AUTO: 98 FL (ref 78–100)
PHOSPHATE SERPL-MCNC: 4.5 MG/DL (ref 2.5–4.5)
PLATELET # BLD AUTO: 117 10E3/UL (ref 150–450)
POTASSIUM SERPL-SCNC: 5.1 MMOL/L (ref 3.4–5.3)
RBC # BLD AUTO: 2.68 10E6/UL (ref 4.4–5.9)
SODIUM SERPL-SCNC: 132 MMOL/L (ref 135–145)
WBC # BLD AUTO: 9.2 10E3/UL (ref 4–11)

## 2023-11-15 PROCEDURE — 85027 COMPLETE CBC AUTOMATED: CPT | Performed by: NURSE PRACTITIONER

## 2023-11-15 PROCEDURE — 36415 COLL VENOUS BLD VENIPUNCTURE: CPT | Performed by: NURSE PRACTITIONER

## 2023-11-15 PROCEDURE — 250N000013 HC RX MED GY IP 250 OP 250 PS 637

## 2023-11-15 PROCEDURE — 97530 THERAPEUTIC ACTIVITIES: CPT | Mod: GO

## 2023-11-15 PROCEDURE — 99024 POSTOP FOLLOW-UP VISIT: CPT | Performed by: NURSE PRACTITIONER

## 2023-11-15 PROCEDURE — 250N000013 HC RX MED GY IP 250 OP 250 PS 637: Performed by: NURSE PRACTITIONER

## 2023-11-15 PROCEDURE — 250N000011 HC RX IP 250 OP 636: Mod: JZ | Performed by: STUDENT IN AN ORGANIZED HEALTH CARE EDUCATION/TRAINING PROGRAM

## 2023-11-15 PROCEDURE — 250N000013 HC RX MED GY IP 250 OP 250 PS 637: Performed by: SURGERY

## 2023-11-15 PROCEDURE — 80048 BASIC METABOLIC PNL TOTAL CA: CPT | Performed by: NURSE PRACTITIONER

## 2023-11-15 PROCEDURE — 120N000002 HC R&B MED SURG/OB UMMC

## 2023-11-15 PROCEDURE — 84100 ASSAY OF PHOSPHORUS: CPT | Performed by: NURSE PRACTITIONER

## 2023-11-15 PROCEDURE — 250N000013 HC RX MED GY IP 250 OP 250 PS 637: Performed by: STUDENT IN AN ORGANIZED HEALTH CARE EDUCATION/TRAINING PROGRAM

## 2023-11-15 PROCEDURE — 83735 ASSAY OF MAGNESIUM: CPT

## 2023-11-15 RX ADMIN — OXYCODONE HYDROCHLORIDE 5 MG: 5 TABLET ORAL at 18:36

## 2023-11-15 RX ADMIN — QUETIAPINE FUMARATE 100 MG: 100 TABLET ORAL at 21:47

## 2023-11-15 RX ADMIN — ONDANSETRON 4 MG: 2 INJECTION INTRAMUSCULAR; INTRAVENOUS at 11:32

## 2023-11-15 RX ADMIN — QUETIAPINE FUMARATE 50 MG: 50 TABLET ORAL at 20:00

## 2023-11-15 RX ADMIN — PANCRELIPASE 2 CAPSULE: 24000; 76000; 120000 CAPSULE, DELAYED RELEASE PELLETS ORAL at 08:32

## 2023-11-15 RX ADMIN — APIXABAN 5 MG: 5 TABLET, FILM COATED ORAL at 20:00

## 2023-11-15 RX ADMIN — CALCIUM CARBONATE (ANTACID) CHEW TAB 500 MG 500 MG: 500 CHEW TAB at 18:37

## 2023-11-15 RX ADMIN — PSYLLIUM HUSK 1 PACKET: 3.4 POWDER ORAL at 08:34

## 2023-11-15 RX ADMIN — PANTOPRAZOLE SODIUM 40 MG: 40 TABLET, DELAYED RELEASE ORAL at 08:33

## 2023-11-15 RX ADMIN — Medication: at 20:01

## 2023-11-15 RX ADMIN — METOPROLOL TARTRATE 100 MG: 50 TABLET, FILM COATED ORAL at 20:00

## 2023-11-15 RX ADMIN — Medication 1 MG: at 20:00

## 2023-11-15 RX ADMIN — QUETIAPINE FUMARATE 100 MG: 100 TABLET ORAL at 00:05

## 2023-11-15 RX ADMIN — APIXABAN 5 MG: 5 TABLET, FILM COATED ORAL at 08:33

## 2023-11-15 RX ADMIN — ATORVASTATIN CALCIUM 10 MG: 10 TABLET, FILM COATED ORAL at 20:01

## 2023-11-15 RX ADMIN — AMLODIPINE BESYLATE 10 MG: 10 TABLET ORAL at 08:33

## 2023-11-15 RX ADMIN — ACETAMINOPHEN 650 MG: 325 TABLET, FILM COATED ORAL at 08:55

## 2023-11-15 RX ADMIN — ACETAMINOPHEN 975 MG: 325 TABLET, FILM COATED ORAL at 17:41

## 2023-11-15 RX ADMIN — METOPROLOL TARTRATE 100 MG: 50 TABLET, FILM COATED ORAL at 08:33

## 2023-11-15 RX ADMIN — Medication 50 MCG: at 08:33

## 2023-11-15 RX ADMIN — OXYCODONE HYDROCHLORIDE 5 MG: 5 TABLET ORAL at 10:52

## 2023-11-15 ASSESSMENT — ACTIVITIES OF DAILY LIVING (ADL)
ADLS_ACUITY_SCORE: 47

## 2023-11-15 NOTE — PLAN OF CARE
Goal Outcome Evaluation:      Plan of Care Reviewed With: patient    Overall Patient Progress: no changeOverall Patient Progress: no change    Outcome Evaluation: AOx4, able to make needs known. L and R PIV WDL. Soft and bite sized diet with thickened liquids, tolerating. L AKA with wound vac off as surgery to replace this AM (supplies on windowsill). Wrap remains in place for compression, low to mod pain but declining pain meds. Not OOB this shift, PT following. Haircut today at 1530 (please time with PT). Will discharge to [facility] once bed is available, possible this weekend per facility. Will cotninue to monitor and follow POC.

## 2023-11-15 NOTE — PROGRESS NOTES
Vascular Surgery Progress Note  11/15/2023       Subjective:  Prevena connecting tubing was accidentally removed last night, reconnected this morning. LLE prevena dressing to be changed on 11/20/2023.  Continued with encouragement of p.o. intake.  Pending ARU discharge when bed available.    Objective:  Temp:  [98.2  F (36.8  C)-99.1  F (37.3  C)] 98.5  F (36.9  C)  Pulse:  [] 109  Resp:  [15-20] 20  BP: (124-167)/(78-91) 139/81  SpO2:  [97 %-98 %] 98 %    I/O last 3 completed shifts:  In: 417 [P.O.:417]  Out: 675 [Urine:625; Emesis/NG output:50]      Gen: resting comfortably in bed, answering questions appropriately, no delirium/confusion, whispering words, not in acute distress  CV: regular rate and rhythm per tele  Resp: non-labored, on room air   Abd: Abdominal incision with dressing, c/d/I.  Ext: RLE wwp, palpable DP pulse, LLE stump incision nonerythematous  Prevena LLE placed on 11/13/23 - to be replaced in 7 days  Skin: Blanching erythema resolved.     Labs:  Recent Labs   Lab 11/15/23  0548 11/14/23  0608 11/13/23  0546   WBC 9.2 8.1 6.7   HGB 8.3* 8.0* 8.1*   * 131* 131*       Recent Labs   Lab 11/15/23  0548 11/14/23  0608 11/13/23  0546   * 134* 134*   POTASSIUM 5.1 5.2 4.8   CHLORIDE 100 101 101   CO2 21* 21* 21*   BUN 56.5* 58.4* 58.1*   CR 1.93* 1.89* 1.85*   * 108* 119*   OMAR 10.2 9.6 9.5   MAG 2.1 2.1 2.0   PHOS 4.5 4.8* 5.3*      Assessment/Plan:   Alfredo Burnham is a 68 year old male with PMH of HTN and previous TIA who presented with R sided abdominal pain found to have contained ruptured AAA, now s/p open AAA repair 9/24 into 9/25am with 30 min supraceliac clamp time, prolonged inter-renal clamp time, temporary abdominal closure. He did have bloody stool postop with flex sig 9/25 demonstrating ischemic mucosal changes of the rectum, repeated abdominal without evidence of transmural necrosis of the small or large intestine, or the rectum. On 9/29 he was started on  CRRT and subsequently iHD. S/p closure of abdominal fascia and subcutaneous tissue wound VAC applied 10/2/23. 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 On 10/20/23 underwent abdominal incision closure.     10/24/23 found to have hgb 5.8, CTA demonstrated left psoas muscle hematoma now size 6 cm x 3 cm x 3 cm, barely seen on CTA from 10/13. Lesser sac of the peritoneal cavity collection stable, hematoma in left iliac stable, no active extravasation. Hep gtt held.     Overnight 10/24-10/25 patient more lethargic with fevers at 102.1 thought to be strokelike symptoms, stroke code called, workup negative. Hemoglobin dropped again to 5.8, despite holding heparin for 24 hours. Repeat CTA: demonstrated hematoma in LLE stump, with otherwise stable left iliac hematoma and stable left psoas muscle hematoma. Now resoled: patient's hgb stable, he is progressing well with continued improvements. Off iHD with renal recovery, tunneled line removed and cleared by SLP for diet. Improving PO intake, NJT removed.     Neuro:   - Seroquel at bedtime and as needed for anxiety/delirium episodes  - Previously had stroke code called for unequal pupils, c/f facial droop 10/6: workup revealed: Numerous, punctate late hyperacute to acute infarctions thought to be embolic in nature. No embolic source identified in workup.  - Follow-up with neurology 4-6 weeks after discharge, ok with a/c.     HEENT:   - ENT consult for ongoing dysphonia/dysphagia: endoscopy with L vocal cord paralysis. S/p bedside vocal fold injection 11/1/23 with otolaryngology.     CV:   - Amlodipine 10mg daily (at home on amlodipine 5mg and benazepril 20mg). No ACE for now. Metoprolol, up to 100mg PO BID, tolerating well.    - Goal SBP <160, MAP >65  - Labetalol prn for SBP >160  - Continue statin  - PE+, venous duplex with nonocclusive to occlusive thrombus of the left GSV from the level of the knee to the groin and nonocclusive thrombus of the left distal femoral vein and popliteal  veins, increased in extent compared to 9/30/2023.   - Heparin drip transitioned to Eliquis 5mg BID 11/13/23. (will need [facility] management for cost effective co-pays)    Resp:   - On room air    GI:   - Concern for Pancreatitis with peripancreatic fluid collection on CT 10/13 with question of bleeding into the collection. GI signed off as collection not drainable. - Improved appearance on repeat CT 10/25. Monitoring.    - Removed NJT 11/13/23 per family request, patient is committing to increase p.o. intake to achieve optimal caloric including high-protein intake.  - Nutrition following, appreciate recommendations, on calorie count.   - When stool consistency firms, will order fecal elastase levels to officially check for pancreatic insufficiency.  - Cleared by SLP For IDDSI level 6 soft and bite sized diet with level 3 moderately thickened liquids (NO straws, 1:1 observation, patient is blind, must be sitting upright, alert). FEES ordered for this morning due to silent aspirations and continued dysphonia/dysphagia despite ENT injection of vocal folds.   - Please assist with meals as much as possible due to patient vision impairment   - Change abd dressing daily  - Loose stools, resolved.     FEN:   - Electrolyte replacement prn     Renal:   - Cr 1.89, Continues to make good amount of urine.   - Last iHD 10/27, tunneled line removed 11/2/23, nephrology signed off  - Received lasix for elevated K with great response. Hyperkalemia resolved.     Heme:   - Hgb stable  - DVT as above, PE   - Heparin drip transitioned to Eliquis this morning 5mg BID 11/13/23.     ID:   -No further fevers, no leukocytosis. Patient has allergy to cefepime (hives). Continue to monitor for fevers, leukocytosis.    MSK:   - L AKA 10/17/23: prevena applied 10/30/23  - ACE wrap on L AKA at all times  - Orthotics provided stump protector and  11/1/23    Derm:   - Rash improved, was secondary to Cefepime.    Misc:   - Pediatric/Small tubes for  blood draws (spoke with lab, included this request in daily lab draws)  - Appreciate aggressive PT/OT ROM, patient severely deconditioned and requires extensive physical therapy: Okay with lifting the equivalent of a gallon of water. Restrict utilizing upper extremities for lifting himself, transfers with upper extremities to chair, etc until 11/16/23 after which can be liberalized.  - Abd binder on at all times.  - Please keep LLE AKA wrapped with ACE.  - ARU discharge ready since 11/13/23. PMR recommends ARU, pending bed availability.    Discussed patient with Dr. Lowe.    Miryam Carrasco, [facility]  Vascular Surgery  Pager: [phone]  [email or web address]  Send message or 10 digit call back number Securely via [employer] with the [employer] Web Console (learn more here)

## 2023-11-15 NOTE — PROGRESS NOTES
Surgery team paged as wound VAC has come off patient. Dressing remains in place with no new drainage. Surgery assessed at bedside. New equipment was ordered and awaiting arrival. Per MD okay to wait for AM team to assess if needed.

## 2023-11-15 NOTE — PROGRESS NOTES
Calorie Count  Intake recorded for: 11/14  Total Kcals: 901 Total Protein: 56g  Kcals from Hospital Food: 541  Protein: 42g  Kcals from Outside Food (average):360 Protein: 14g  # Meals Ordered from Kitchen: 3 meals + supplement from outside the hospital   # Meals Recorded: 2 meals (First - 50% Greek yogurt)      (Second - 90% puree mixed berries, 20% tomato soup)  # Supplements Recorded: 100% 1 Boost Plus - from outside the hospital; 90% 1 Ensure Max Protein, 50% 1 Magic Cup, 25% 1 Gelatain 20 sugar free

## 2023-11-15 NOTE — INTERIM SUMMARY
"[facility] Pre-Admission Screen    Referral Source:  [facility] 7C MED SURG 7408-01  Admit date to referring facility: 9/24/2023    Physical Medicine and Rehab Consult Completed: Yes- PMR rec ARC on 11/10/2023    Rehab Diagnosis:    Amputation 05.3 Unilateral LE AKA - s/p left above knee amputation in setting of critical limb ischemia following contained AAA rupture s/p open AAA repair c/b strokes    Justification for Acute Inpatient Rehabilitation.  Mr. 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 He is s/p open AAA repair w/ resection of ruptured abdominal aneurysm on 9/24 complicated by LLE ischemia. LLE ischemia progressed to acute critical limb ischemia, ultimately requiring L AKA on 10/17/2023.  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  He is now medically stable and ready for transfer to acute inpatient rehabilitation.     The patient requires transfer to acute inpatient rehabilitation for intensive therapies not available in a lesser level of care including PT/OT/SLP, ongoing medical management at least three days per week, and rehabilitative nursing cares. At baseline, Nelson was IND with mobility and cares. He currently requires Ax1-2 or a lift for safe functional mobility. The patient requires an intensive inpatient rehab program to address the following acute impairments: weakness, impaired balance, moderate to severe pharyngeal dysphagia, baseline " visual impairments (low vision), fatigue, impaired weight shifting, deconditioning, pain, new sternal and abdominal precautions, and LLE NWB status in setting of LLE AKA.  These impairments are contributing to significant functional limitations impacting his ability to safely perform transfers, gait, w/c based mobility, ADLs, and ability to safely and effectively eat. Alfredo is an ideal ARC candidate based on his medical complexity, motivation/participation, family support,  and accessible home environment.     Current Active Medical Management Needs/Risks for Clinical Complications  The patient requires the high level of rehabilitation physician supervision that accompanies the provision of intensive rehabilitation therapy.  The patient needs the services of the rehabilitation physician to assess the patient medically and functionally and to modify the course of treatment as needed to maximize the patient's capacity to benefit from the rehabilitation process. He requires physician oversight at least 3x/week to manage the following:   Musculoskeletal:  L AKA 10/17/23: prevena applied 10/30/23. Orthotics provided stump protector and  11/1/23. Provide ongoing education regarding edema management, residual limb shaping, prosthetics and adaptive equipment as indicated. Pt also to wear abdominal binder on abdomen at all times per Vascular surgery. Reinforce postsurgical precautions.   Neurologic status in setting of embolic strokes: assess for neurologic recovery. Provide stroke education and risk factor reduction strategies (modifiable risk factors include tobacco use, HTN). Pt is at risk for recurrent strokes and pain in setting of CVA. Follow-up w/ neurology in 4-6 weeks after discharge.    Anticoagulation in pt w/ recent embolic strokes, PE, and DVTs: pt w/ nonocclusive to occlusive thrombus from level of knee to groin and nonocclusive thrombus of the left distal femoral vein and popliteal veins - currently on  Eliquis. Pt is at risk for further DVT given multiple surgeries, low mobility, and recent stroke.   Pain. Patient will need ongoing assessment and adjustment of pain medications for optimal participation in therapies. Pain currently controlled on Tylenol, Oxycodone, and ice.  Pt will need instruction in phantom limb pain and desensitization strategies as needed.   Integument: All abdominal staples to be removed in 2 weeks on 11/30/23, as well as every other staple to be removed from stump in 2 weeks by Vascular Surgery. Pt also w/ Prevena wound vac to L residual limb. Prevena to be changed on 11/17 & Vascular team will follow at [facility] for ongoing management.  Pt at risk for altered skin integrity, infection, and wound dehiscence in setting of low mobility, edema, and bowel and bladder incontinence. Pt also w/ rash - requiring hydrocortisone 2.5% ointment and Kenalog 0.1% ointment.  Pt also requiring PRN Benadryl for itching.   Cardiovascular status in setting of HTN: pt on Amlodipine 10 mg daily. Metoprolol 100mg PO BID. Goal SBP <160, MAP > 65 in setting of recent AAA repair. Pt also on statin.   Nutritional status in setting of dysphagia: Removed NJT 11/13/23 per family request, patient is committing to increase p.o. intake to achieve optimal caloric including high-protein intake. Continue deitician/nutrition support while on ARC to ensure optimal oral intake to assist w/ wound healing. Pt on Expedite cup 60mL daily. Cleared by SLP For IDDSI level 6 soft and bite sized diet with level 3 moderately thickened liquids (NO straws, 1:1 observation, patient is blind, must be sitting upright, alert). Pt also to receive Creon 24 TID w/ meals.   HEENT: ENT consult for ongoing dysphonia/dysphagia: endoscopy with L vocal cord paralysis. S/p bedside vocal fold injection 11/1/23 with otolaryngology.   : pt w/ bowel and bladder incontinence. Assist w/ bowel regimen (on Metamucil, Senokot) in pt at risk for opioid induced  constipation. Assist w/ bladder mgmt.   Mental health: promote acceptance of altered body image and coping w/ lifestyle changes. Pt is also at risk for mood and sleep disorders in setting of stroke. Pt on Seroquel at bedtime and as needed for anxiety/delirium. Consult health psychology as needed to address mental health, coping, and adjustment to lifestyle changes.   He is at risk for falls in setting of new L AKA, impaired strength, impaired balance, bowel and bladder incontinence.     Past Medical/Surgical History  Surgery in the past 100 days: Yes  Additional relevant past medical history: legal blindness (macular degeneration), hypertension, TIA, tobacco use    Level of Functioning Prior to Admission:  LIVING ENVIRONMENT  People in Home: spouse  Current Living Arrangements: apartment  Home Accessibility: no concerns (elevator access)  Transportation Anticipated: family or friend will provide  Living Environment Comments: Pt lives in an apartment with his spouse Tabby, [address] apartment building, no accessibility concerns.    SELF-CARE  Usual Activity Tolerance: good  Regular Exercise: Yes  Activity/Exercise Type: walking  Exercise Amount/Frequency: daily  Equipment Currently Used at Home: none  Activity/Exercise/Self-Care Comment: Pt reports IND at baseline with ADLs/ADLs. Pt very active at baseline.     Level of Function: GG Scale (Section GG Functional Ability and Goals; CMS's HAY Version 3.0 Manual effective 10.1.2019):  PT Current Function Goals for Rehab   Bed Rolling 3 Partial/moderate assistance 6 Independent   Supine to Sit 3 Partial/moderate assistance 6 Independent   Sit to Stand 1 Dependent (Ax2 or lift) 4 Supervision or touching assitance   Transfer 1 Dependent (Ax2 or lift) 4 Supervision or touching assitance   Ambulation 88 Not attempted due to safety 3 Partial/moderate assistance   Stairs Not completed Not Applicable     OT Current Function Goals for Rehab   Feeding 3 Partial/moderate assistance 6  Independent   Grooming 3 Partial/moderate assistance 6 Independent   Bathing Not completed 5 Setup or clean-up assistance   Upper Body Dressing Not completed 6 Independent   Lower Body Dressing 2 Substantial/maximal assistance 6 Independent   Toileting 1 Dependent 6 Independent   Toilet Transfer 1 Dependent (Ax2 or lift) 4 Supervision or touching assitance   Tub/Shower Transfer 88 Not attempted due to safety 4 Supervision or touching assitance   Cognition Not Assessed Supervision     SLP Current Function Goals for Rehab   Swallow Impaired Tolerate least restrictive diet without signs & symptoms of aspiration and adhere to safe swallow strategies   Communication Impaired Communicate basic needs effectively     Current Diet:  3-Liquidized/moderately thick and 6-Soft and bite sized; 1:1 supervision for all PO intake    Summary Statement:  Alfredo currently has physical therapy, occupational therapy and speech therapy needs. He performs supine>sit with modA.  Sit>stand with modAx2 w/ bed height elevated and R knee buckling noted.  He was able to perform a slideboard transfer with modA. MaxA to don brief in supine. Alfredo will need a full ADL assessment when appropriate on [facility]. SLP addressing dysphagia and note patient to have ongoing dysphonia. The pt reports some changes with his attention and memory and would benefit from assessment of his cognition while at [facility]. He will benefit from intensive rehab to optimize his medical and functional recovery from prolonged complex medical stay.  His baseline low vision will also significantly impact rehab course. Anticipate caregiver training w/ pt's wife prior to returning home w/ ongoing therapies.     Expected Therapies and Services Required During Inpatient Rehab Admission  Intensity of Therapy: Patient requires intensive therapies not available in a lesser level of care. Patient is motivated, making gains, and can tolerate 3 hours of therapy a day.  Physical Therapy: 60 minutes  "per day, 7 days a week for 21 days  Occupational Therapy: 60 minutes per day, 7 days a week for 21 days  Speech and Language Therapy: 60 minutes per day, 7 days a week for 21 days  Rehabilitation Nursing Needs: Patient requires 24 hour Rehab Nursing to manage wound care, bowel program, bladder program, vitals, medication education, positioning, carryover of new rehab techniques, care coordination, skin integrity, assess neurologic status, pain management, stroke education, post-surgical incision care to promote healing and prevent infection, provide safe environment for patient at falls risk, monitor nutritional intake, and edema management.    Precautions/restrictions/special needs:  Precautions: fall precautions, abdominal precautions, strenal precautions, and weight bearing precautions (NWB LLE residual limb)  Restrictions: Per vascular note: \"Okay with lifting the equivalent of a gallon of water. Restrict utilizing upper extremities for lifting himself, transfers with upper extremities to chair, etc until 11/16/23 after which can be liberalized.\", abdominal binder at all times  Special Needs: lift, visually impaired, and Wound Vac    Expected Level of Improvement: Anticipate with intensive therapies, close medical management, and rehabilitative nursing care the patient will improve strength, balance, tolerance to activity, safety to ensure CGA or less with basic mobility and ADL performance to allow return home with family assistance. Patient will likely need dual goals while on ARC, for standing functional transfers or slideboard. May need additional equipment to make a home discharge a safe option. Anticipate w/c based mobility upon discharge with option for short distance gait pending rehab progress.  Expected Length of time to achieve: 21 days.    Anticipated Discharge Needs:  Anticipated Discharge Destination: Home  Anticipated Discharge Support: Family member  24/7 support available : Yes  Identified " caregiver(s):  wife Tabby  Anticipated Discharge Needs: Home with homecare    Identified challenges/barriers: prolonged hospitalization w/ medical complexity and multiple complications; low vision    Liaison signature/date/time:    Physician statement of review and agreement:  I have reviewed and am in agreement of the need for IRF stay to address above functional and medical needs. In addition to above statements address, Patient requires intensive active and ongoing therapeutic intervention and multiple therapies; Patient requires medical supervision; Expected to actively participate in the intensive rehab program; Sufficiently stable to actively participate; Expectation for measurable improvement in functional capacity or adaption to impairments.    MD signature/date/time:

## 2023-11-15 NOTE — PROGRESS NOTES
Care Management Follow Up    Length of Stay (days): 52    Expected Discharge Date: 11/16/2023     Concerns to be Addressed: all concerns addressed in this encounter     Patient plan of care discussed at interdisciplinary rounds: Yes    Anticipated Discharge Disposition:  [facility]     Anticipated Discharge Services:  Transportation services  Anticipated Discharge DME:  Prevena     Patient/family educated on Medicare website which has current facility and service quality ratings:  yes  Education Provided on the Discharge Plan:  yes  Patient/Family in Agreement with the Plan:  yes    Referrals Placed by CM/SW:      [facility]  [facility], [place] [address]  Ph: [phone]    Private pay costs discussed: Not applicable    Additional Information:  Katalina Tapia updated LIZBET and reported that he is the backup to come to [facility] tomorrow. Katalina requested a ride be set up for tomorrow around 1430 just in case a bed is available tomorrow .    LIZBET called [facility] and arranged wheelchair transportation for 11/15 with a pickup window of [phone].    Katalina will provide another update regarding bed availability tomorrow.     LIZBET will continue to follow up for discharge needs.     SARIAH Heller  [address]   Office: [phone]  Pager: [phone]  [email or web address]

## 2023-11-15 NOTE — PROGRESS NOTES
I was paged by the RN that wound vac had come off. I assessed the patient bedside. The wound vac tubing on the left AKA stump came off; otherwise wound dry; No discharge. Patient asymptomatic, resting comfortably. Temp: 98.7  F (37.1  C) Temp src: Oral BP: (!) 167/85 Pulse: 108   Resp: 15 SpO2: 98 % O2 Device: None (Room air) Oxygen Delivery: 2 LPM   Discussed with Dr Ada Kong, fellow on call. Day team will reassess in AM; keep the wound clean and dry meanwhile. I ordered new wound vac supplies.    Chiara LEEBS  PGY1 General Surgery  [facility]  Surgery Cross Cover

## 2023-11-15 NOTE — PLAN OF CARE
"Goal Outcome Evaluation:      Plan of Care Reviewed With: patient, spouse    Overall Patient Progress: no change    Outcome Evaluation: Abdominal dressings changed, CDI. Abdominal binder in place. L AKA ace-wrapped, keep in place for stump to fit prostesis. Pt worked w/ PT today and was a heavy A2 to chair. RN/NA assisted pt B2B, heavy A2 pivoting on R foot to bed. Ordered giocushion for pt chair to facilitate easier transfers. Pt interm anxious and feeling defeated about residual limb. Provided reassurance and praise for pt progress thus far. Continuing to thicken liquids to prevent aspiration. Encourage pt to continue with rehab plan, discourage relying on lift. Monitor wound healing, promote adequate nutrition to support wound healing. Prepare for discharge to [facility] sometime this weekend -once bed is available. Called 6B d/t missing bag of clothing per wife. 6B RN returned clothing to pt this kelsey    BP (!) 147/84 (BP Location: Right arm)   Pulse 90   Temp 98.2  F (36.8  C) (Oral)   Resp 16   Ht 1.727 m (5' 8\")   Wt 67.7 kg (149 lb 4 oz)   SpO2 97%   BMI 22.69 kg/m       Olena Adan RN on 11/14/2023 at 7:07 PM       "

## 2023-11-16 ENCOUNTER — APPOINTMENT (OUTPATIENT)
Dept: OCCUPATIONAL THERAPY | Facility: CLINIC | Age: 68
DRG: 268 | End: 2023-11-16
Payer: COMMERCIAL

## 2023-11-16 ENCOUNTER — APPOINTMENT (OUTPATIENT)
Dept: PHYSICAL THERAPY | Facility: CLINIC | Age: 68
DRG: 268 | End: 2023-11-16
Payer: COMMERCIAL

## 2023-11-16 LAB
ANION GAP SERPL CALCULATED.3IONS-SCNC: 9 MMOL/L (ref 7–15)
BUN SERPL-MCNC: 61.4 MG/DL (ref 8–23)
CALCIUM SERPL-MCNC: 9.9 MG/DL (ref 8.8–10.2)
CHLORIDE SERPL-SCNC: 99 MMOL/L (ref 98–107)
CREAT SERPL-MCNC: 2.09 MG/DL (ref 0.67–1.17)
DEPRECATED HCO3 PLAS-SCNC: 22 MMOL/L (ref 22–29)
EGFRCR SERPLBLD CKD-EPI 2021: 33 ML/MIN/1.73M2
ERYTHROCYTE [DISTWIDTH] IN BLOOD BY AUTOMATED COUNT: 16.6 % (ref 10–15)
GLUCOSE SERPL-MCNC: 114 MG/DL (ref 70–99)
HCT VFR BLD AUTO: 24.4 % (ref 40–53)
HGB BLD-MCNC: 7.8 G/DL (ref 13.3–17.7)
MAGNESIUM SERPL-MCNC: 2.1 MG/DL (ref 1.7–2.3)
MCH RBC QN AUTO: 30.5 PG (ref 26.5–33)
MCHC RBC AUTO-ENTMCNC: 32 G/DL (ref 31.5–36.5)
MCV RBC AUTO: 95 FL (ref 78–100)
PHOSPHATE SERPL-MCNC: 4.5 MG/DL (ref 2.5–4.5)
PLATELET # BLD AUTO: 105 10E3/UL (ref 150–450)
POTASSIUM SERPL-SCNC: 5.2 MMOL/L (ref 3.4–5.3)
RBC # BLD AUTO: 2.56 10E6/UL (ref 4.4–5.9)
SODIUM SERPL-SCNC: 130 MMOL/L (ref 135–145)
WBC # BLD AUTO: 8.6 10E3/UL (ref 4–11)

## 2023-11-16 PROCEDURE — 97110 THERAPEUTIC EXERCISES: CPT | Mod: GO

## 2023-11-16 PROCEDURE — 80048 BASIC METABOLIC PNL TOTAL CA: CPT | Performed by: NURSE PRACTITIONER

## 2023-11-16 PROCEDURE — 97530 THERAPEUTIC ACTIVITIES: CPT | Mod: GO

## 2023-11-16 PROCEDURE — 85027 COMPLETE CBC AUTOMATED: CPT | Performed by: NURSE PRACTITIONER

## 2023-11-16 PROCEDURE — 84100 ASSAY OF PHOSPHORUS: CPT | Performed by: NURSE PRACTITIONER

## 2023-11-16 PROCEDURE — 120N000002 HC R&B MED SURG/OB UMMC

## 2023-11-16 PROCEDURE — 250N000013 HC RX MED GY IP 250 OP 250 PS 637: Performed by: NURSE PRACTITIONER

## 2023-11-16 PROCEDURE — 250N000013 HC RX MED GY IP 250 OP 250 PS 637

## 2023-11-16 PROCEDURE — 83735 ASSAY OF MAGNESIUM: CPT

## 2023-11-16 PROCEDURE — 97530 THERAPEUTIC ACTIVITIES: CPT | Mod: GP

## 2023-11-16 PROCEDURE — 36415 COLL VENOUS BLD VENIPUNCTURE: CPT | Performed by: NURSE PRACTITIONER

## 2023-11-16 PROCEDURE — 250N000013 HC RX MED GY IP 250 OP 250 PS 637: Performed by: STUDENT IN AN ORGANIZED HEALTH CARE EDUCATION/TRAINING PROGRAM

## 2023-11-16 PROCEDURE — 250N000013 HC RX MED GY IP 250 OP 250 PS 637: Performed by: SURGERY

## 2023-11-16 PROCEDURE — 99024 POSTOP FOLLOW-UP VISIT: CPT | Performed by: NURSE PRACTITIONER

## 2023-11-16 PROCEDURE — 258N000003 HC RX IP 258 OP 636: Performed by: STUDENT IN AN ORGANIZED HEALTH CARE EDUCATION/TRAINING PROGRAM

## 2023-11-16 RX ORDER — SODIUM CHLORIDE, SODIUM LACTATE, POTASSIUM CHLORIDE, CALCIUM CHLORIDE 600; 310; 30; 20 MG/100ML; MG/100ML; MG/100ML; MG/100ML
INJECTION, SOLUTION INTRAVENOUS CONTINUOUS
Status: ACTIVE | OUTPATIENT
Start: 2023-11-16 | End: 2023-11-16

## 2023-11-16 RX ORDER — SENNOSIDES 8.6 MG
2 TABLET ORAL 2 TIMES DAILY
Status: DISCONTINUED | OUTPATIENT
Start: 2023-11-16 | End: 2023-11-17 | Stop reason: HOSPADM

## 2023-11-16 RX ADMIN — ACETAMINOPHEN 975 MG: 325 TABLET, FILM COATED ORAL at 14:00

## 2023-11-16 RX ADMIN — ACETAMINOPHEN 975 MG: 325 TABLET, FILM COATED ORAL at 06:38

## 2023-11-16 RX ADMIN — METOPROLOL TARTRATE 100 MG: 50 TABLET, FILM COATED ORAL at 07:46

## 2023-11-16 RX ADMIN — QUETIAPINE FUMARATE 50 MG: 50 TABLET ORAL at 20:34

## 2023-11-16 RX ADMIN — PSYLLIUM HUSK 1 PACKET: 3.4 POWDER ORAL at 07:46

## 2023-11-16 RX ADMIN — ACETAMINOPHEN 975 MG: 325 TABLET, FILM COATED ORAL at 20:34

## 2023-11-16 RX ADMIN — METOPROLOL TARTRATE 100 MG: 50 TABLET, FILM COATED ORAL at 20:34

## 2023-11-16 RX ADMIN — Medication 60 ML: at 14:00

## 2023-11-16 RX ADMIN — PANTOPRAZOLE SODIUM 40 MG: 40 TABLET, DELAYED RELEASE ORAL at 07:46

## 2023-11-16 RX ADMIN — APIXABAN 5 MG: 5 TABLET, FILM COATED ORAL at 07:46

## 2023-11-16 RX ADMIN — APIXABAN 5 MG: 5 TABLET, FILM COATED ORAL at 20:35

## 2023-11-16 RX ADMIN — Medication 50 MCG: at 07:46

## 2023-11-16 RX ADMIN — Medication 1 MG: at 20:33

## 2023-11-16 RX ADMIN — OXYCODONE HYDROCHLORIDE 5 MG: 5 TABLET ORAL at 10:21

## 2023-11-16 RX ADMIN — PANCRELIPASE 2 CAPSULE: 24000; 76000; 120000 CAPSULE, DELAYED RELEASE PELLETS ORAL at 07:50

## 2023-11-16 RX ADMIN — TRIAMCINOLONE ACETONIDE: 1 OINTMENT TOPICAL at 15:50

## 2023-11-16 RX ADMIN — SODIUM CHLORIDE, POTASSIUM CHLORIDE, SODIUM LACTATE AND CALCIUM CHLORIDE: 600; 310; 30; 20 INJECTION, SOLUTION INTRAVENOUS at 15:51

## 2023-11-16 RX ADMIN — Medication: at 15:50

## 2023-11-16 RX ADMIN — ATORVASTATIN CALCIUM 10 MG: 10 TABLET, FILM COATED ORAL at 20:33

## 2023-11-16 RX ADMIN — HYDROXYZINE HYDROCHLORIDE 25 MG: 25 TABLET ORAL at 16:26

## 2023-11-16 RX ADMIN — AMLODIPINE BESYLATE 10 MG: 10 TABLET ORAL at 07:46

## 2023-11-16 RX ADMIN — SENNOSIDES 2 TABLET: 8.6 TABLET, FILM COATED ORAL at 10:22

## 2023-11-16 RX ADMIN — CALCIUM CARBONATE (ANTACID) CHEW TAB 500 MG 1000 MG: 500 CHEW TAB at 13:58

## 2023-11-16 RX ADMIN — HYDROCORTISONE: 25 OINTMENT TOPICAL at 15:50

## 2023-11-16 RX ADMIN — SENNOSIDES 2 TABLET: 8.6 TABLET, FILM COATED ORAL at 20:34

## 2023-11-16 ASSESSMENT — ACTIVITIES OF DAILY LIVING (ADL)
ADLS_ACUITY_SCORE: 47
ADLS_ACUITY_SCORE: 45
ADLS_ACUITY_SCORE: 47
ADLS_ACUITY_SCORE: 45
ADLS_ACUITY_SCORE: 47

## 2023-11-16 NOTE — PLAN OF CARE
Cares from: 1379-1486    V/S & pain: vitally stable, denies pain  Neuro: alert, orientedx4  Respiratory:room air , on cont. Pulse ox, 2lpm NC at bedtime (pt. Refused to hooked this shift)  Skin:L AKA with ace bandage, abdominal incision with dressing and binder(dressing clean, intact and dry), left wrist bruise  GI/:incontinene, voids without difficulty  Nutrition: calorie counts, soft and bite sized diet (level 6), liquid/ moderately thick (level 3)  Lines/drains: prevena wound VAC attached to stump, RPIV,LPIV  Activity: lift    Events this shift: pt. Requested med for generalized skin itchiness ( PRN Benadryl cream applied).call light w/in reach and able to make needs known, will continue to monitor.    Rounds done. Slept in between cares. No acute event this shift.      Plan:  for [facility] 11/6 in case bed is available .

## 2023-11-16 NOTE — PROGRESS NOTES
Care Management Follow Up    Length of Stay (days): 53    Expected Discharge Date: 11/17/2023     Concerns to be Addressed: all concerns addressed in this encounter     Patient plan of care discussed at interdisciplinary rounds: Yes    Anticipated Discharge Disposition:  [facility]     Anticipated Discharge Services:  Transportation services  Anticipated Discharge DME:  Prevena    Patient/family educated on Medicare website which has current facility and service quality ratings:  yes  Education Provided on the Discharge Plan:  yes  Patient/Family in Agreement with the Plan:  yes    Referrals Placed by CM/SW:      [facility]  [address] [address]  Ph: [phone]    Private pay costs discussed: Not applicable    Additional Information:  LIZBET spoke with Kathy from  [facility]. She reported that a bed will not be available today. Kathy reported that she anticipates having a bed for the patient tomorrow. She stated that she will let SW know later what time transportation should be arranged for tomorrow.    LIZBET called [employer] and cancelled the ride that was scheduled for today [phone].    Kathy informed LIZBET that [facility] will have a bed available for the patient tomorrow 11/17. Kathy requested the patient not arrive to [facility] tell after 1400    LIZBET called [employer] and rescheduled wheelchair transportation for 11/17 with a pikcup window of [phone]    LIZBET will continue to follow for discharge needs.     SARIAH Heller  [address]   Office: [phone]  Pager: [phone]  [email or web address]

## 2023-11-16 NOTE — PROGRESS NOTES
Vascular Surgery Progress Note  11/16/2023       Subjective:  No acute events overnight, pending discharge to ARU.    Objective:  Temp:  [98.1  F (36.7  C)-99.3  F (37.4  C)] 99.1  F (37.3  C)  Pulse:  [] 104  Resp:  [16-18] 18  BP: (120-142)/(63-85) 132/79  SpO2:  [97 %-100 %] 99 %    I/O last 3 completed shifts:  In: 1137 [P.O.:1137]  Out: 630 [Urine:630]      Gen: resting comfortably in bed, answering questions appropriately, no delirium/confusion, whispering words, not in acute distress  CV: regular rate and rhythm per tele  Resp: non-labored, on room air   Abd: Abdominal incision with dressing, c/d/I.  Ext: RLE wwp, palpable DP pulse, LLE stump incision nonerythematous  Prevena LLE placed on 11/13/23 - to be replaced in 7 days  Skin: Blanching erythema resolved.     Labs:  Recent Labs   Lab 11/16/23  0704 11/15/23  0548 11/14/23  0608   WBC 8.6 9.2 8.1   HGB 7.8* 8.3* 8.0*   * 117* 131*       Recent Labs   Lab 11/16/23  0704 11/15/23  0548 11/14/23  0608   * 132* 134*   POTASSIUM 5.2 5.1 5.2   CHLORIDE 99 100 101   CO2 22 21* 21*   BUN 61.4* 56.5* 58.4*   CR 2.09* 1.93* 1.89*   * 132* 108*   OMAR 9.9 10.2 9.6   MAG 2.1 2.1 2.1   PHOS 4.5 4.5 4.8*      Assessment/Plan:   Alfredo Burnham is a 68 year old male with PMH of HTN and previous TIA who presented with R sided abdominal pain found to have contained ruptured AAA, now s/p open AAA repair 9/24 into 9/25am with 30 min supraceliac clamp time, prolonged inter-renal clamp time, temporary abdominal closure. He did have bloody stool postop with flex sig 9/25 demonstrating ischemic mucosal changes of the rectum, repeated abdominal without evidence of transmural necrosis of the small or large intestine, or the rectum. On 9/29 he was started on CRRT and subsequently iHD. S/p closure of abdominal fascia and subcutaneous tissue wound VAC applied 10/2/23. 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 On 10/20/23 underwent abdominal incision closure.      10/24/23 found to have hgb 5.8, CTA demonstrated left psoas muscle hematoma now size 6 cm x 3 cm x 3 cm, barely seen on CTA from 10/13. Lesser sac of the peritoneal cavity collection stable, hematoma in left iliac stable, no active extravasation. Hep gtt held.     Overnight 10/24-10/25 patient more lethargic with fevers at 102.1 thought to be strokelike symptoms, stroke code called, workup negative. Hemoglobin dropped again to 5.8, despite holding heparin for 24 hours. Repeat CTA: demonstrated hematoma in LLE stump, with otherwise stable left iliac hematoma and stable left psoas muscle hematoma. Now resoled: patient's hgb stable, he is progressing well with continued improvements. Off iHD with renal recovery, tunneled line removed and cleared by SLP for diet. Improving PO intake, NJT removed.     Neuro:   - Seroquel at bedtime and as needed for anxiety/delirium episodes  - Previously had stroke code called for unequal pupils, c/f facial droop 10/6: workup revealed: Numerous, punctate late hyperacute to acute infarctions thought to be embolic in nature. No embolic source identified in workup.  - Follow-up with neurology 4-6 weeks after discharge, ok with a/c.     HEENT:   - ENT consult for ongoing dysphonia/dysphagia: endoscopy with L vocal cord paralysis. S/p bedside vocal fold injection 11/1/23 with otolaryngology.     CV:   - Amlodipine 10mg daily (at home on amlodipine 5mg and benazepril 20mg). No ACE for now. Metoprolol, up to 100mg PO BID, tolerating well.    - Goal SBP <160, MAP >65  - Labetalol prn for SBP >160  - Continue statin  - PE+, venous duplex with nonocclusive to occlusive thrombus of the left GSV from the level of the knee to the groin and nonocclusive thrombus of the left distal femoral vein and popliteal veins, increased in extent compared to 9/30/2023.   - Heparin drip transitioned to Eliquis 5mg BID 11/13/23. (will need [facility] management for cost effective co-pays)    Resp:   - On room  air    GI:   - Concern for Pancreatitis with peripancreatic fluid collection on CT 10/13 with question of bleeding into the collection. GI signed off as collection not drainable. - Improved appearance on repeat CT 10/25. Monitoring.    - Removed NJT 11/13/23 per family request, patient is committing to increase p.o. intake to achieve optimal caloric including high-protein intake.  - Nutrition following, appreciate recommendations, on calorie count.   - When stool consistency firms, will order fecal elastase levels to officially check for pancreatic insufficiency.  - Cleared by SLP For IDDSI level 6 soft and bite sized diet with level 3 moderately thickened liquids (NO straws, 1:1 observation, patient is blind, must be sitting upright, alert). FEES ordered for this morning due to silent aspirations and continued dysphonia/dysphagia despite ENT injection of vocal folds.   - Please assist with meals as much as possible due to patient vision impairment   - Change abd dressing daily  - Loose stools, resolved.     FEN:   - Electrolyte replacement prn     Renal:   - Cr 1.89, Continues to make good amount of urine.   - Last iHD 10/27, tunneled line removed 11/2/23, nephrology signed off  - Received lasix for elevated K with great response. Hyperkalemia resolved.     Heme:   - Hgb stable  - DVT as above, PE   - Heparin drip transitioned to Eliquis this morning 5mg BID 11/13/23.     ID:   -No further fevers, no leukocytosis. Patient has allergy to cefepime (hives). Continue to monitor for fevers, leukocytosis.    MSK:   - L AKA 10/17/23: prevena initially applied 10/30/23  - ACE wrap on L AKA at all times  - Orthotics provided stump protector and  11/1/23    Derm:   - Rash improved, was secondary to Cefepime.    Misc:   - Pediatric/Small tubes for blood draws (spoke with lab, included this request in daily lab draws)  - Appreciate aggressive PT/OT ROM, patient severely deconditioned and requires extensive physical  therapy: Liberalizing restrictions lifting starting today 11/16/2023, activity as tolerated.  - All abdominal staples to be removed in 2 weeks on 11/30/23, removed every other staple today 11/16/2023.  - Every other staple to be removed from stump in 2 weeks (same time when all abdominal staples are removed on 11/30/23).  - Abd binder on at all times.  - Please keep LLE AKA wrapped with ACE.  - ARU discharge ready since 11/13/23. PMR recommends ARU, pending bed availability.    Discussed patient with Dr. Lowe.    Miryam Carrasco, [facility]  Vascular Surgery  Pager: [phone]  [email or web address]  Send message or 10 digit call back number Securely via [employer] with the [employer] Web Console (learn more here)

## 2023-11-16 NOTE — PROGRESS NOTES
Calorie Count  Intake recorded for: 11/15  Total Kcals: 0 Total Protein: 0g  Kcals from Hospital Food: 0   Protein: 0g  Kcals from Outside Food (average):0 Protein: 0g  # Meals Ordered from Kitchen: 3 meals   # Meals Recorded: no intake recorded.   # Supplements Recorded: no intake recorded.

## 2023-11-16 NOTE — PROGRESS NOTES
Rehab Admissions:  Patient and family education completed regarding ARC level of care, anticipated LOS (3 weeks), POC (skilled PT/OT/SLP) and visitor policy. Pt lethargic and sleeping during part of visit; wife Tabby engaged throughout and she is able to assist pt upon disch home. All questions answered and contact information for ARC provided. Anticipate ARC bed available tomorrow.    Thank you for the referral, we will continue to follow this patient for post acute placement.     Determination of admission is based upon the patient's need for an intensive, interdisciplinary approach to rehabilitation, their ability to progress, their ability to tolerate intensive therapies, their need for daily physician supervision, their need for twenty four hour nursing assistance, and their ability and willingness to participate in such a program.    Kathy Ulrich CM  Rehab Liaison/  [facility]  11/16/2023    3:59 PM

## 2023-11-17 ENCOUNTER — HOSPITAL ENCOUNTER (INPATIENT)
Facility: CLINIC | Age: 68
LOS: 14 days | Discharge: ANOTHER HOSPITAL | DRG: 559 | End: 2023-12-01
Attending: PHYSICAL MEDICINE & REHABILITATION | Admitting: STUDENT IN AN ORGANIZED HEALTH CARE EDUCATION/TRAINING PROGRAM
Payer: COMMERCIAL

## 2023-11-17 VITALS
HEART RATE: 102 BPM | HEIGHT: 68 IN | RESPIRATION RATE: 18 BRPM | WEIGHT: 142.42 LBS | OXYGEN SATURATION: 96 % | TEMPERATURE: 99.1 F | SYSTOLIC BLOOD PRESSURE: 135 MMHG | DIASTOLIC BLOOD PRESSURE: 72 MMHG | BODY MASS INDEX: 21.58 KG/M2

## 2023-11-17 DIAGNOSIS — I10 PRIMARY HYPERTENSION: ICD-10-CM

## 2023-11-17 DIAGNOSIS — I71.43 INFRARENAL ABDOMINAL AORTIC ANEURYSM (AAA) WITHOUT RUPTURE (H): ICD-10-CM

## 2023-11-17 DIAGNOSIS — G47.00 INSOMNIA, UNSPECIFIED TYPE: ICD-10-CM

## 2023-11-17 DIAGNOSIS — K59.00 CONSTIPATION, UNSPECIFIED CONSTIPATION TYPE: ICD-10-CM

## 2023-11-17 DIAGNOSIS — I71.33: ICD-10-CM

## 2023-11-17 DIAGNOSIS — I10 HYPERTENSION, UNSPECIFIED TYPE: ICD-10-CM

## 2023-11-17 DIAGNOSIS — Z89.612 S/P ABOVE KNEE AMPUTATION, LEFT (H): Primary | ICD-10-CM

## 2023-11-17 DIAGNOSIS — I70.222 CRITICAL LIMB ISCHEMIA OF LEFT LOWER EXTREMITY (H): ICD-10-CM

## 2023-11-17 DIAGNOSIS — N17.0 ACUTE KIDNEY FAILURE WITH TUBULAR NECROSIS (H): ICD-10-CM

## 2023-11-17 LAB
ANION GAP SERPL CALCULATED.3IONS-SCNC: 10 MMOL/L (ref 7–15)
BUN SERPL-MCNC: 58.2 MG/DL (ref 8–23)
CALCIUM SERPL-MCNC: 9.5 MG/DL (ref 8.8–10.2)
CHLORIDE SERPL-SCNC: 100 MMOL/L (ref 98–107)
CREAT SERPL-MCNC: 1.97 MG/DL (ref 0.67–1.17)
DEPRECATED HCO3 PLAS-SCNC: 20 MMOL/L (ref 22–29)
EGFRCR SERPLBLD CKD-EPI 2021: 36 ML/MIN/1.73M2
ERYTHROCYTE [DISTWIDTH] IN BLOOD BY AUTOMATED COUNT: 16.2 % (ref 10–15)
GLUCOSE SERPL-MCNC: 118 MG/DL (ref 70–99)
HCT VFR BLD AUTO: 24.4 % (ref 40–53)
HGB BLD-MCNC: 7.7 G/DL (ref 13.3–17.7)
MAGNESIUM SERPL-MCNC: 2 MG/DL (ref 1.7–2.3)
MCH RBC QN AUTO: 30.8 PG (ref 26.5–33)
MCHC RBC AUTO-ENTMCNC: 31.6 G/DL (ref 31.5–36.5)
MCV RBC AUTO: 98 FL (ref 78–100)
PHOSPHATE SERPL-MCNC: 4.1 MG/DL (ref 2.5–4.5)
PLATELET # BLD AUTO: 107 10E3/UL (ref 150–450)
POTASSIUM SERPL-SCNC: 4.8 MMOL/L (ref 3.4–5.3)
RBC # BLD AUTO: 2.5 10E6/UL (ref 4.4–5.9)
SODIUM SERPL-SCNC: 130 MMOL/L (ref 135–145)
WBC # BLD AUTO: 7.3 10E3/UL (ref 4–11)

## 2023-11-17 PROCEDURE — 85027 COMPLETE CBC AUTOMATED: CPT | Performed by: NURSE PRACTITIONER

## 2023-11-17 PROCEDURE — 84100 ASSAY OF PHOSPHORUS: CPT | Performed by: NURSE PRACTITIONER

## 2023-11-17 PROCEDURE — 250N000013 HC RX MED GY IP 250 OP 250 PS 637: Performed by: STUDENT IN AN ORGANIZED HEALTH CARE EDUCATION/TRAINING PROGRAM

## 2023-11-17 PROCEDURE — 83735 ASSAY OF MAGNESIUM: CPT

## 2023-11-17 PROCEDURE — 99024 POSTOP FOLLOW-UP VISIT: CPT | Performed by: NURSE PRACTITIONER

## 2023-11-17 PROCEDURE — 36415 COLL VENOUS BLD VENIPUNCTURE: CPT | Performed by: NURSE PRACTITIONER

## 2023-11-17 PROCEDURE — 250N000013 HC RX MED GY IP 250 OP 250 PS 637: Performed by: PHYSICIAN ASSISTANT

## 2023-11-17 PROCEDURE — 128N000003 HC R&B REHAB

## 2023-11-17 PROCEDURE — 250N000013 HC RX MED GY IP 250 OP 250 PS 637: Performed by: NURSE PRACTITIONER

## 2023-11-17 PROCEDURE — 99222 1ST HOSP IP/OBS MODERATE 55: CPT | Mod: AI | Performed by: STUDENT IN AN ORGANIZED HEALTH CARE EDUCATION/TRAINING PROGRAM

## 2023-11-17 PROCEDURE — 80048 BASIC METABOLIC PNL TOTAL CA: CPT | Performed by: NURSE PRACTITIONER

## 2023-11-17 PROCEDURE — 250N000013 HC RX MED GY IP 250 OP 250 PS 637: Performed by: SURGERY

## 2023-11-17 RX ORDER — QUETIAPINE FUMARATE 100 MG/1
100 TABLET, FILM COATED ORAL AT BEDTIME
Status: ON HOLD | DISCHARGE
Start: 2023-11-17 | End: 2023-11-29

## 2023-11-17 RX ORDER — METOPROLOL TARTRATE 50 MG
100 TABLET ORAL 2 TIMES DAILY
Status: DISCONTINUED | OUTPATIENT
Start: 2023-11-17 | End: 2023-12-01 | Stop reason: HOSPADM

## 2023-11-17 RX ORDER — HYDROXYZINE HYDROCHLORIDE 50 MG/1
50 TABLET, FILM COATED ORAL EVERY 6 HOURS PRN
Status: ON HOLD | DISCHARGE
Start: 2023-11-17 | End: 2023-11-29

## 2023-11-17 RX ORDER — HYDROXYZINE HYDROCHLORIDE 25 MG/1
25 TABLET, FILM COATED ORAL EVERY 6 HOURS PRN
Status: DISCONTINUED | OUTPATIENT
Start: 2023-11-17 | End: 2023-12-01 | Stop reason: HOSPADM

## 2023-11-17 RX ORDER — ATORVASTATIN CALCIUM 10 MG/1
10 TABLET, FILM COATED ORAL EVERY EVENING
DISCHARGE
Start: 2023-11-17

## 2023-11-17 RX ORDER — ACETAMINOPHEN 325 MG/1
975 TABLET ORAL EVERY 6 HOURS
Status: ON HOLD | DISCHARGE
Start: 2023-11-17 | End: 2023-11-29

## 2023-11-17 RX ORDER — PANTOPRAZOLE SODIUM 40 MG/1
40 TABLET, DELAYED RELEASE ORAL
Status: DISCONTINUED | OUTPATIENT
Start: 2023-11-18 | End: 2023-11-26

## 2023-11-17 RX ORDER — ACETAMINOPHEN 325 MG/1
650 TABLET ORAL EVERY 6 HOURS PRN
Status: ON HOLD | DISCHARGE
Start: 2023-11-17 | End: 2023-11-29

## 2023-11-17 RX ORDER — ACETAMINOPHEN 325 MG/1
650 TABLET ORAL EVERY 6 HOURS PRN
Status: DISCONTINUED | OUTPATIENT
Start: 2023-11-17 | End: 2023-12-01 | Stop reason: HOSPADM

## 2023-11-17 RX ORDER — CALCIUM CARBONATE 500 MG/1
500-1000 TABLET, CHEWABLE ORAL 2 TIMES DAILY PRN
Status: DISCONTINUED | OUTPATIENT
Start: 2023-11-17 | End: 2023-12-01 | Stop reason: HOSPADM

## 2023-11-17 RX ORDER — POLYETHYLENE GLYCOL 3350 17 G/17G
17 POWDER, FOR SOLUTION ORAL DAILY PRN
Status: DISCONTINUED | OUTPATIENT
Start: 2023-11-17 | End: 2023-12-01 | Stop reason: HOSPADM

## 2023-11-17 RX ORDER — NALOXONE HYDROCHLORIDE 0.4 MG/ML
0.2 INJECTION, SOLUTION INTRAMUSCULAR; INTRAVENOUS; SUBCUTANEOUS
Status: DISCONTINUED | OUTPATIENT
Start: 2023-11-17 | End: 2023-12-01 | Stop reason: HOSPADM

## 2023-11-17 RX ORDER — VITAMIN B COMPLEX
50 TABLET ORAL DAILY
Status: ON HOLD | DISCHARGE
Start: 2023-11-18 | End: 2023-11-29

## 2023-11-17 RX ORDER — ATORVASTATIN CALCIUM 10 MG/1
10 TABLET, FILM COATED ORAL EVERY EVENING
Status: DISCONTINUED | OUTPATIENT
Start: 2023-11-17 | End: 2023-12-01 | Stop reason: HOSPADM

## 2023-11-17 RX ORDER — VITAMIN B COMPLEX
50 TABLET ORAL DAILY
Status: DISCONTINUED | OUTPATIENT
Start: 2023-11-18 | End: 2023-11-26

## 2023-11-17 RX ORDER — AMLODIPINE BESYLATE 10 MG/1
10 TABLET ORAL DAILY
Status: DISCONTINUED | OUTPATIENT
Start: 2023-11-18 | End: 2023-12-01 | Stop reason: HOSPADM

## 2023-11-17 RX ORDER — QUETIAPINE FUMARATE 25 MG/1
25 TABLET, FILM COATED ORAL 2 TIMES DAILY PRN
Status: DISCONTINUED | OUTPATIENT
Start: 2023-11-17 | End: 2023-12-01 | Stop reason: HOSPADM

## 2023-11-17 RX ORDER — QUETIAPINE FUMARATE 25 MG/1
100 TABLET, FILM COATED ORAL AT BEDTIME
Status: DISCONTINUED | OUTPATIENT
Start: 2023-11-17 | End: 2023-11-27

## 2023-11-17 RX ORDER — QUETIAPINE FUMARATE 50 MG/1
50 TABLET, FILM COATED ORAL AT BEDTIME
Status: ON HOLD | DISCHARGE
Start: 2023-11-17 | End: 2023-11-29

## 2023-11-17 RX ORDER — NALOXONE HYDROCHLORIDE 0.4 MG/ML
0.4 INJECTION, SOLUTION INTRAMUSCULAR; INTRAVENOUS; SUBCUTANEOUS
Status: DISCONTINUED | OUTPATIENT
Start: 2023-11-17 | End: 2023-12-01 | Stop reason: HOSPADM

## 2023-11-17 RX ORDER — PANTOPRAZOLE SODIUM 40 MG/1
40 TABLET, DELAYED RELEASE ORAL
Status: ON HOLD | DISCHARGE
Start: 2023-11-18 | End: 2023-11-29

## 2023-11-17 RX ORDER — ACETAMINOPHEN 325 MG/1
975 TABLET ORAL 3 TIMES DAILY
Status: DISCONTINUED | OUTPATIENT
Start: 2023-11-17 | End: 2023-12-01 | Stop reason: HOSPADM

## 2023-11-17 RX ORDER — METOPROLOL TARTRATE 100 MG
100 TABLET ORAL 2 TIMES DAILY
Status: ON HOLD | DISCHARGE
Start: 2023-11-17 | End: 2023-11-29

## 2023-11-17 RX ORDER — AMLODIPINE BESYLATE 10 MG/1
10 TABLET ORAL DAILY
Status: ON HOLD | DISCHARGE
Start: 2023-11-18 | End: 2023-12-19

## 2023-11-17 RX ORDER — OXYCODONE HYDROCHLORIDE 5 MG/1
5 TABLET ORAL EVERY 6 HOURS PRN
Qty: 20 TABLET | Refills: 0 | Status: ON HOLD | OUTPATIENT
Start: 2023-11-17 | End: 2023-11-29

## 2023-11-17 RX ORDER — OXYCODONE HYDROCHLORIDE 5 MG/1
5 TABLET ORAL EVERY 6 HOURS PRN
Status: DISCONTINUED | OUTPATIENT
Start: 2023-11-17 | End: 2023-12-01 | Stop reason: HOSPADM

## 2023-11-17 RX ORDER — TRIAMCINOLONE ACETONIDE 1 MG/G
OINTMENT TOPICAL 2 TIMES DAILY PRN
Status: ON HOLD | DISCHARGE
Start: 2023-11-17 | End: 2023-11-29

## 2023-11-17 RX ORDER — SENNOSIDES 8.6 MG
2 TABLET ORAL 2 TIMES DAILY
Status: DISCONTINUED | OUTPATIENT
Start: 2023-11-17 | End: 2023-11-20

## 2023-11-17 RX ORDER — HYDROXYZINE HYDROCHLORIDE 25 MG/1
25 TABLET, FILM COATED ORAL EVERY 6 HOURS PRN
Status: ON HOLD | DISCHARGE
Start: 2023-11-17 | End: 2023-12-19

## 2023-11-17 RX ORDER — CALCIUM CARBONATE 500 MG/1
2 TABLET, CHEWABLE ORAL 2 TIMES DAILY PRN
Status: ON HOLD | DISCHARGE
Start: 2023-11-17 | End: 2023-11-29

## 2023-11-17 RX ORDER — SENNOSIDES 8.6 MG
2 TABLET ORAL 2 TIMES DAILY PRN
Status: ON HOLD | DISCHARGE
Start: 2023-11-17 | End: 2023-11-29

## 2023-11-17 RX ADMIN — Medication 50 MCG: at 09:14

## 2023-11-17 RX ADMIN — PANCRELIPASE 2 CAPSULE: 24000; 76000; 120000 CAPSULE, DELAYED RELEASE PELLETS ORAL at 09:14

## 2023-11-17 RX ADMIN — ATORVASTATIN CALCIUM 10 MG: 10 TABLET, FILM COATED ORAL at 21:25

## 2023-11-17 RX ADMIN — CALCIUM CARBONATE (ANTACID) CHEW TAB 500 MG 500 MG: 500 CHEW TAB at 09:14

## 2023-11-17 RX ADMIN — ACETAMINOPHEN 975 MG: 325 TABLET, FILM COATED ORAL at 21:22

## 2023-11-17 RX ADMIN — Medication 1 MG: at 21:25

## 2023-11-17 RX ADMIN — QUETIAPINE FUMARATE 100 MG: 25 TABLET ORAL at 21:23

## 2023-11-17 RX ADMIN — APIXABAN 5 MG: 5 TABLET, FILM COATED ORAL at 09:14

## 2023-11-17 RX ADMIN — APIXABAN 5 MG: 5 TABLET, FILM COATED ORAL at 21:25

## 2023-11-17 RX ADMIN — SENNOSIDES 2 TABLET: 8.6 TABLET, FILM COATED ORAL at 09:14

## 2023-11-17 RX ADMIN — Medication 60 ML: at 09:15

## 2023-11-17 RX ADMIN — AMLODIPINE BESYLATE 10 MG: 10 TABLET ORAL at 09:14

## 2023-11-17 RX ADMIN — PANTOPRAZOLE SODIUM 40 MG: 40 TABLET, DELAYED RELEASE ORAL at 09:14

## 2023-11-17 RX ADMIN — ACETAMINOPHEN 975 MG: 325 TABLET, FILM COATED ORAL at 05:32

## 2023-11-17 RX ADMIN — METOPROLOL TARTRATE 100 MG: 50 TABLET, FILM COATED ORAL at 21:32

## 2023-11-17 RX ADMIN — METOPROLOL TARTRATE 100 MG: 50 TABLET, FILM COATED ORAL at 09:14

## 2023-11-17 RX ADMIN — SENNOSIDES 2 TABLET: 8.6 TABLET, FILM COATED ORAL at 21:25

## 2023-11-17 ASSESSMENT — ACTIVITIES OF DAILY LIVING (ADL)
ADLS_ACUITY_SCORE: 45
ADLS_ACUITY_SCORE: 24
ADLS_ACUITY_SCORE: 35
ADLS_ACUITY_SCORE: 45
ADLS_ACUITY_SCORE: 24
ADLS_ACUITY_SCORE: 45
ADLS_ACUITY_SCORE: 48
ADLS_ACUITY_SCORE: 22
ADLS_ACUITY_SCORE: 48
ADLS_ACUITY_SCORE: 48

## 2023-11-17 NOTE — PROGRESS NOTES
CLINICAL NUTRITION SERVICES - BRIEF NOTE     Nutrition Prescription    RECOMMENDATIONS FOR MDs/PROVIDERS TO ORDER:  None currently    Recommendations already ordered by Registered Dietitian (RD):  Fecal elastase returned as normal. Will discontinue Creon as PERT not indicated based on these results.   Continue oral nutrition supplements: Breakfast and Dinner - rotate all flavors of Magic Cup; 2PM and HS - Gelatein orange or cherry with patient able to order additional supplements PRN  Continue room service ordering assistance  Continue expedite wound healing supplement    Future/Additional Recommendations:  Recommend patient continue to work with dietitian at [facility] to continue working on increasing PO.        New findings:   Per chart review, plan to move to [facility] today.     Diet: IDDSI L6 Soft & Bite Sized and IDDSI L3 Moderately Thick Liquids + Breakfast and Dinner - Please rotate all flavors of Magic Cup; 2PM and HS - Gelatein orange or cherry; Allow any additional snack/supplement PRN if requested   Room service ordering assistance     Intake/Tolerance: Calorie Counts:   11/16     Total Kcals: 0           Total Protein: 0g  -- missing record of PO for 3 of 3 meals ordered (not enough information in I/O to estimate PO)  11/15     Total Kcals: 0           Total Protein: 0g -- missing record of PO for 3 of 3 meals ordered (not enough information in I/O to estimate PO)  11/14     Total Kcals: 901       Total Protein: 56g     GI:  Last BM: 11/13/23    Weight:  Most Recent Weight: 64.6 kg (142 lb 6.7 oz)  on 11/17/23 via Bed scale (Bed zeroed)  Body mass index is 21.65 kg/m .    Meds:  Creon 24 TID with meals  Protonix  Metamucil daily; Senokot BID  Vitamin D3 50 mcg daily; Expedite wound healing supplement    Labs:   Latest Reference Range & Units 11/13/23 02:41   Elastase Fecal >199.9 ug/g 358.0      11/15/23 05:48 11/16/23 07:04 11/17/23 05:57   Sodium 132 (L) 130 (L) 130 (L)   Potassium 5.1 5.2 4.8   Chloride  100 99 100   Carbon Dioxide (CO2) 21 (L) 22 20 (L)   Urea Nitrogen 56.5 (H) 61.4 (H) 58.2 (H)   Creatinine 1.93 (H) 2.09 (H) 1.97 (H)   GFR Estimate 37 (L) 33 (L) 36 (L)   Calcium 10.2 9.9 9.5   Anion Gap 11 9 10   Magnesium 2.1 2.1 2.0   Phosphorus 4.5 4.5 4.1   Glucose 132 (H) 114 (H) 118 (H)       Skin:  S/p AKA       Implementation  Room Service ordering assistance  Medical food supplement therapy   Expedite wound healing supplement  Discontinue Creon based on fecal elastase results      RD to follow per protocol      Amarilys Gandara RDN, LD    6C (beds 5800-5510)/7C (beds 9271-4416)/ED   RD pager: [phone]  Weekend/Holiday RD pager: [phone]

## 2023-11-17 NOTE — PROGRESS NOTES
Pt seen bedside at [facility] room 7408 and reports that they have no questions regarding prosthetics 2 weeks post op. The patient was more alert but still not having any questions regarding prosthetics. FU PRN. John Paul Bae , LPO.

## 2023-11-17 NOTE — PHARMACY-ADMISSION MEDICATION HISTORY
Pharmacist Admission Medication History    Admission Medication History completed by pharmacist, Abbi Sweet on 9/25/2023. Please see Pharmacy - Admission Medication History note under previous encounter at [facility]. for information regarding prior to admission medications.    Andie Ledezma, PharmD   Clinical Pharmacist

## 2023-11-17 NOTE — DISCHARGE INSTRUCTIONS
Discharge Instructions    Diet  - Per SLP  - Be sure to drink plenty of fluids.     Activity  - No lifting, pushing, or pulling greater than 10 pounds and no strenuous exercise for 4-6 weeks.   - We encourage throat lozenges or cough drops if you develop a cough.  - No driving while on narcotic pain medications (such as oxycodonel)  - No driving until you are able to fully twist to both sides quickly and without any pain    Wound Care  - Inspect your wounds daily for signs of infection (increased redness, drainage, pain)  - Keep your wound clean and dry, inspect it daily for the above signs.  - You may shower 24 hours after your surgery, but do not soak incisions in tubs, pools, lakes for at least 14 days post-surgery. When showering, please do not scrub your incisions - let soapy water run over them, pat to dry.    Abdominal incision care:   1. Remove old dressing  2. Apply iodeine/betadine, allow to dry  3. Reapply clean dry dressing   4. Leave staples as they are, they will be managed by vascular surgery    Left Leg Stump:  Prevena applied 11/17/23: please leave as is. DO NOT REMOVE PREVENA. This will stay on for 7 days and will be removed by vascular surgery only.  ACE wrap the left leg stump at all times.      Call Vascular surgery [facility] for:  - Temperature > 101F, chills, rigors, dizziness  - Redness around or purulent drainage from wound  - Inability to tolerate diet, nausea or vomiting  - You stop passing gas, develop significant bloating, abdominal pain  - Have blood in stools/vomit  - Have severe diarrhea/constipation  - Any other questions or concerns.    Medication Instructions  Most of your medications have changed, please take medications as prescribed.    Follow up:    PCP:  Follow up with PCP in 5-7 days for post-hospitalization follow up. You may call to set this up - most clinics will be able to get you an appointment within the week if you let them know you were recently hospitalized and need to  see your PCP.     Vascular:  Vascular surgery will see you at ARU weekly. Once discharged from ARU we will schedule follow up in our clinic.    With general vascular surgery questions, concerns, or to request/change an appointment, please call:      Vascular Call Center  [phone]    To contact someone after 5 pm, on a weekend, or on a Holiday, please call:  [facility]  [phone], option 4 to have the Attending Physician for Vascular Surgery Service paged.

## 2023-11-17 NOTE — PLAN OF CARE
Goal Outcome Evaluation:      Plan of Care Reviewed With: patient    Overall Patient Progress: no changeOverall Patient Progress: no change    Outcome Evaluation: patient on room air with o2 Sat monitor on.  Sat of 95%.  Patient voiding well.    A:  patient anxious about discharge tomorrow.  Ride set up for 2PM.  Wound vac patent to left AKA.  Patient moving self in bed.  States pain minimal.  Schedule tylenol given.  R:  continue to monitor and treat per plan of care.

## 2023-11-17 NOTE — H&P
[facility]   Acute Rehabilitation Unit  Admission History and Physical    CHIEF COMPLAINT   Amputation 05.3 Unilateral LE AKA - s/p left above knee amputation in setting of critical limb ischemia following contained AAA rupture s/p open AAA repair c/b strokes      HISTORY OF PRESENT ILLNESS  Alfredo Burnham is a 68 year old right hand dominant male with past medical history of hypertension, TIA, 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, thrombocytopenia, and tobacco use disorder who presented on 9/24/23 with severe abdominal pain radiating to back, found to have contained, ruptured abdominal aortic aneurysm.  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     Hemodynamics improved post-operatively, but ongoing concerns for ischemia in left lower extremity.  GI was consulted on 9/25 due to hematochezia with some concern for bowel ischemia.  Patient returned to OR on 9/25 with vascular and colorectal surgery teams for exploratory laparotomy, abdominal washout and temporary closure with no evidence of transmural colonic or small bowel ischemia; as well as unsuccessful LLE embolectomy.  He returned to OR on 9/26 for repeat abdominal washout, retroperitoneal closure, LLE wound washout/closure.    On 9/28, patient developed hypoxemia with CXR revealing increased opacification of left lung.  He underwent bronchoscopy for removal of mucus plug.    He was again returned to OR on 9/29 for abdominal exploration and washout, bowel evaluation, temporary abdominal closure, and partial closure of left medial fasciotomy.    On 10/2, he underwent repeat exploratory laparotomy with abdominal wall closure, as well as wound vac replacement to left lower  extremity.    Nephrology was consulted for MAYA.  CRRT initiated on 9/30, transitioned to HD on 10/4.     Patient was initially extubated on 10/3.    On 10/6 AM, patient was noted to have new R facial droop an anisocoria, stroke code called.  CT with no acute hemorrhage; did show age-indeterminate though chronic appearing left thalamic lacunar infarct.  CTA head/neck without significant stenosis or occlusion, but did show partially imaged PE in right, moderate bilateral pleural effusions.  MRI revealed numerous, punctate late hyperacute to acute infarctions are concerning for an embolic etiology.  Additional stroke evaluation with TTE with EF >70%, no significant valvular abnormalities; EKG with sinus tachycardia and frequent PVCs; LDL 32; A1c 5.7%.  Started on statin therapy and ongoing anticoagulation.    Patient had likely aspiration event on 10/9 during ADDIS.  On 10/13, code blue was called for acute hypoxemic respiratory failure, requiring re-intubation.  CT revealed peripancreatic fluid collection with hyperattenuation concerning for possible hemorrhage.  GI team was again consulted, did not feel acute intervention warranted.  CT also revealed enlarged left iliacus hematoma.      On 10/17, patient ultimately underwent left above knee amputation due to ischemia.  He was successfully extubated on 10/19.    He returned to OR on 10/20 for I&D of open midline abdominal incision and delayed primary closure.  Prevena wound vac was placed to LLE on 10/23.    Patient had recurrent hemoglobin drop on 10/24; CTA revealed left psoas muscle hematoma.  Heparin drip was held for 48 hours, resumed with stable hemoglobin.  Transitioned to DOAC on 11/15.    Last HD run on 10/27, held since with noted recovery.  HD line removed 11/2.    Hospital course was further complicated by intermittent fevers, anasarca, LLE DVT, acute blood loss anemia and thrombocytopenia (requiring multiple transfusions), sinus tachycardia, encephalopathy,  malnutrition (off tube feeding since 11/13 - removed per family request), dysphagia, left vocal fold paralysis s/p injection laryngoplasty 11/1, pain, oropharyngeal candidiasis, stress-induced hyperglycemia, exanthematous drug eruption (possibly 2/2 cefepime; dermatology consulted), loose stools, and multiple electrolyte derangements.    During acute hospitalization, patient was seen and evaluated by PT and OT, who collectively recommended that patient would benefit from ongoing therapies in the acute inpatient rehabilitation setting.      In review of the therapy notes, he currently performs supine>sit with modA.  Sit>stand with modAx2 w/ bed height elevated and R knee buckling noted.  He was able to perform a slideboard transfer with modA. MaxA to don brief in supine. Alfredo will need a full ADL assessment when appropriate on [facility]. SLP addressing dysphagia and note patient to have ongoing dysphonia. The pt reports some changes with his attention and memory and would benefit from assessment of his cognition while at [facility]. He will benefit from intensive rehab to optimize his medical and functional recovery from prolonged complex medical stay.  His baseline low vision will also significantly impact rehab course. Anticipate caregiver training w/ pt's wife prior to returning home w/ ongoing therapies.     Upon arrival to the rehab unit, he had no complications with the transfer.  He continues to endorse generalized weakness without sensory changes in his extremities.  He has mild to moderate pain requiring occasional doses of oxycodone every other day in the left leg at the terminal residual limb.  The pain has not increased over time.  He denies pain below the residual limb.  He is breathing well with no chest pain when breathing or during therapies.  He is having regular bowel movements, less volume than normal, but with no constipation.  He denies bowel incontinence. He is using a urinal with good urine flow, no  hesitancy, and no dysuria.    His goal is to discharge home in a timely manner.  Of note, his wife said that as a  he could benefit [facility].  He is not wearing his stump protector as it fits poorly due to his left leg reducing in size in the interim.    PAST MEDICAL HISTORY   Reviewed and updated in Epic.  Past Medical History:   Diagnosis Date    Hypertension 2/8/2011    Macular degeneration        SURGICAL HISTORY  Reviewed and updated in Epic.  Past Surgical History:   Procedure Laterality Date    AMPUTATE LEG ABOVE KNEE Left 10/17/2023    Procedure: AMPUTATION, ABOVE KNEE and Abdominal wound vac exchange;  Surgeon: Ash Boucher MBBS;  Location: [facility] OR    CLOSE SECONDARY WOUND ABDOMEN N/A 10/20/2023    Procedure: Delayed primary subcutaneous abdominal closure;  Surgeon: Boris Lowe;  Location: [facility]    INCISION AND DRAINAGE ABDOMEN WASHOUT, COMBINED N/A 9/25/2023    Procedure: abdominal washout, combined, repacking,  abthera placement;  Surgeon: Ash Boucher MBBS;  Location: [facility] OR    INCISION AND DRAINAGE ABDOMEN WASHOUT, COMBINED N/A 9/26/2023    Procedure: Abdominal washout, Retroperitoneal closure, washout left lower extremity wound;  Surgeon: Boris Lowe;  Location: [facility] OR    IR CVC TUNNEL REMOVAL RIGHT  11/2/2023    IR OR ANGIOGRAM  9/25/2023    IRRIGATION AND DEBRIDEMENT ABDOMEN WASHOUT, COMBINED N/A 9/29/2023    Procedure: exploration of  ABDOMINAL CAVITY, placement of abthera;  Surgeon: Boris Lowe;  Location: [facility]    IRRIGATION AND DEBRIDEMENT ABDOMEN WASHOUT, COMBINED N/A 10/2/2023    Procedure: Exploratory laparotomy, abominal closure, wound vac change left lower extremity;  Surgeon: Jonathan Fry MD;  Location: [facility]    IRRIGATION AND DEBRIDEMENT LOWER EXTREMITY, COMBINED Left 9/29/2023    Procedure: exploration of left lower extremity, partial closure of left lower extremity, wound vac placement;  Surgeon: Boris Lowe;  Location:  "[facility] OR    LAPAROTOMY EXPLORATORY N/A 9/25/2023    Procedure: Laparotomy exploratory;  Surgeon: Roger Shirley MD;  Location: [facility]    REPAIR ANEURYSM ABDOMINAL AORTA N/A 9/24/2023    Procedure: Resection of Ruptureed Abdominal Aneurysm, Aortic biliary bypass with 20 x 10 mm Bifurcated hemagard graft, Temporary Abdominal Closure, Endovascular Balloon Inclusion of Aorta;  Surgeon: Boris Lowe;  Location: [facility]    SIGMOIDOSCOPY FLEXIBLE N/A 9/25/2023    Procedure: Sigmoidoscopy flexible;  Surgeon: Gabino Walker MD;  Location: [facility]    THROMBECTOMY LOWER EXTREMITY Left 9/25/2023    Procedure: SFA, popliteal, and tibial thromboembolectomy and four compartment fasciotomy;  Surgeon: Ash Boucher MBBS;  Location: [facility] OR       SOCIAL HISTORY  Reviewed and updated in Epic.  Marital Status:   Living situation: lives with spouse in accessible senior living apartment.  Elevator from the garage with no steps or significant thresholds.  Doorways are at least 36\" with ample hallway navigation. Standing shower with bar.  Grab bar being installed in toilet area.  Family support: supportive, will have 24/7 assistance.  Tobacco use: daily smoker PTA    Social History     Socioeconomic History    Marital status:      Spouse name: Not on file    Number of children: Not on file    Years of education: Not on file    Highest education level: Not on file   Occupational History    Not on file   Tobacco Use    Smoking status: Every Day     Packs/day: .5     Types: Cigarettes    Smokeless tobacco: Not on file   Substance and Sexual Activity    Alcohol use: Yes     Comment: 1-2/wk    Drug use: No    Sexual activity: Yes     Partners: Female   Other Topics Concern    Parent/sibling w/ CABG, MI or angioplasty before 65F 55M? Yes   Social History Narrative    Not on file     Social Determinants of Health     Financial Resource Strain: Not on file   Food Insecurity: Not on file   Transportation Needs: Not on file "   Physical Activity: Not on file   Stress: Not on file   Social Connections: Not on file   Interpersonal Safety: Not on file   Housing Stability: Not on file       FAMILY HISTORY  Reviewed and updated in Epic.  Family History   Problem Relation Age of Onset    Unknown/Adopted Mother     Heart Disease Mother     Arthritis Mother     Hypertension Brother     Blood Disease Sister     Psychotic Disorder Sister          PRIOR FUNCTIONAL HISTORY   Pt was independent with all ADLs/IADLs, transfers, mobility and gait.  He sustained about 3 falls a year, all mechanical but from normal walking without tripping on objects.  He doesn't use a cane or walker.    MEDICATIONS  Scheduled meds  Medications Prior to Admission   Medication Sig Dispense Refill Last Dose    acetaminophen (TYLENOL) 325 MG tablet 2 tablets (650 mg) by Per Feeding Tube route every 6 hours as needed for fever       acetaminophen (TYLENOL) 325 MG tablet Take 3 tablets (975 mg) by mouth every 6 hours       [START ON 11/18/2023] amLODIPine (NORVASC) 10 MG tablet Take 1 tablet (10 mg) by mouth daily       apixaban ANTICOAGULANT (ELIQUIS) 5 MG tablet Take 1 tablet (5 mg) by mouth 2 times daily       atorvastatin (LIPITOR) 10 MG tablet Take 1 tablet (10 mg) by mouth every evening       calcium carbonate (TUMS) 500 MG chewable tablet Take 2 tablets (1,000 mg) by mouth 2 times daily as needed for heartburn       diphenhydrAMINE-zinc acetate (BENADRYL) 1-0.1 % external cream Apply topically 3 times daily as needed for itching or irritation       hydrOXYzine (ATARAX) 25 MG tablet Take 1 tablet (25 mg) by mouth every 6 hours as needed for other (adjuvant pain)       hydrOXYzine (ATARAX) 50 MG tablet Take 1 tablet (50 mg) by mouth every 6 hours as needed for other (adjuvant pain)       lipase-protease-amylase (CREON 24) 95047-94064-513587 units CPEP per EC capsule Take 2 capsules by mouth 3 times daily (with meals)       lipase-protease-amylase (CREON 24)  "[id number] units CPEP per EC capsule Take 1 capsule by mouth Take with snacks or supplements (With snacks or supplements)       melatonin 1 MG TABS tablet 1 tablet (1 mg) by Oral or Feeding Tube route every evening       metoprolol tartrate (LOPRESSOR) 100 MG tablet Take 1 tablet (100 mg) by mouth 2 times daily Hold for SBP<100       oxyCODONE (ROXICODONE) 5 MG tablet 1 tablet (5 mg) by Oral or Feeding Tube route every 6 hours as needed for moderate pain or severe pain 20 tablet 0     [START ON 11/18/2023] pantoprazole (PROTONIX) 40 MG EC tablet Take 1 tablet (40 mg) by mouth every morning (before breakfast)       [START ON 11/18/2023] psyllium (METAMUCIL/KONSYL) Packet Take 1 packet by mouth daily       QUEtiapine (SEROQUEL) 100 MG tablet 1 tablet (100 mg) by Oral or Feeding Tube route at bedtime       QUEtiapine (SEROQUEL) 50 MG tablet Take 1 tablet (50 mg) by mouth at bedtime Administer at 8PM       sennosides (SENOKOT) 8.6 MG tablet Take 2 tablets by mouth 2 times daily as needed for constipation       triamcinolone (KENALOG) 0.1 % external ointment Apply topically 2 times daily as needed for irritation       [START ON 11/18/2023] Vitamin D3 (CHOLECALCIFEROL) 25 mcg (1000 units) tablet Take 2 tablets (50 mcg) by mouth daily       [START ON 11/18/2023] wound support modular (EXPEDITE) LIQD cup Take 60 mLs by mouth daily          ALLERGIES     Allergies   Allergen Reactions    Penicillins Swelling     Facial swelling    Has tolerated cefazolin.     Cefepime Rash     Diffuse whole body erythematous pruritic rash         REVIEW OF SYSTEMS  A 10 point ROS was performed and negative unless otherwise noted in HPI.     PHYSICAL EXAM  VITAL SIGNS:  There were no vitals taken for this visit.  BMI:  Estimated body mass index is 21.65 kg/m  as calculated from the following:    Height as of 10/20/23: 1.727 m (5' 8\").    Weight as of an earlier encounter on 11/17/23: 64.6 kg (142 lb 6.7 oz).     General: NAD, lying in " bed  HEENT: NC/AT, MMM  Pulmonary: non-labored on room air, lungs CTA bilaterally. Chest binder in place.  Cardiovascular: RRR, holosystolic murmur at apex of heart  Abdominal: soft, non-tender, non-distended.  Bandages midline, clean and dry.  Extremities: warm, well perfused, no edema.  Left BKA, ACE wrapped with drain, no fluid in drain tubing or reservoir.  MSK/neuro:   Mental Status:  alert   Cranial Nerves:   2nd CN: Anisocoria with left mydriasis.  Pupils equally reactive.  Poor visual acuity with no overt field cut.  3rd,4th,6th CN:  EOMI.  5th CN: facial sensation intact   7th CN: right facial droop  8th CN: functional hearing bilaterally  9th, 10th CN: palate elevates symmetrically.  Hoarse voice.  11th CN: sternocleidomastoids and trapezii strong   12th CN: tongue midline. Some tremors, could be fasciculations.   Sensory: Normal to light touch in bilateral upper and lower extremities    Strength:   SF  EF  EE  WE  G  I  HF  KE  DF  EHL  PF   R  5 5 4 5 5 5 5 4 5 5 5  L  5 4 4 4 5 5 1 NA NA NA NA    Reflexes: Right 0 patellar, 1 achilles   Rolle's test: negative bilaterally   Babinski reflex: downgoing on right   Tone per modified Rula Scale: 0/4 in lower extremities   Abnormal movements: None    LABS  CBC RESULTS:   Recent Labs   Lab Test 11/17/23  0557 11/16/23  0704 11/15/23  0548   WBC 7.3 8.6 9.2   RBC 2.50* 2.56* 2.68*   HGB 7.7* 7.8* 8.3*   HCT 24.4* 24.4* 26.2*   MCV 98 95 98   MCH 30.8 30.5 31.0   MCHC 31.6 32.0 31.7   RDW 16.2* 16.6* 16.1*   * 105* 117*       Last Basic Metabolic Panel:  Recent Labs   Lab Test 11/17/23  0557 11/16/23  0704 11/15/23  0548   * 130* 132*   POTASSIUM 4.8 5.2 5.1   CHLORIDE 100 99 100   CO2 20* 22 21*   ANIONGAP 10 9 11   * 114* 132*   BUN 58.2* 61.4* 56.5*   CR 1.97* 2.09* 1.93*   GFRESTIMATED 36* 33* 37*   OMAR 9.5 9.9 10.2         IMPRESSION/PLAN:  Alrfedo Burnham is a 68 year old right hand dominant male with a past medical history of  hypertension, TIA, 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, thrombocytopenia, and tobacco use disorder who was admitted on 9/24/23 with ruptured pararenal abdominal aortic aneurysm s/p open repair complicated by shock and colonic ischemic requiring multiple returns to OR for exploratory laparotomy, washout, and delayed closure with hospital course complicated by LLE ischemia ultimately 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, hematochezia with concern for possible bowel ischemia (no intramural ischemia on ex lap), MAYA requiring CRRT/HD (now off), acute hypoxic respiratory failure requiring 2 separate intubations, PE/DVT, aspiration event, bilateral pleural effusions, acute blood loss anemia/thrombocytopenia (requiring multiple transfusions), multifocal embolic strokes, left psoas muscle and iliac hematomas, peripancreatic fluid collection and concern for possible pancreatitis, left vocal fold paralysis s/p injection 11/1, fevers, volume overload, delirium, tachycardia, exanthematous drug eruption/rash, malnutrition, dysphagia, hyperglycemia, loose stools, and multiple additional electrolyte abnormalities.  He is now admitted to [facility] on 11/17/23 for multidisciplinary rehabilitation and ongoing medical management.      Admission to acute inpatient rehab 11/17/23.    Impairment group code: Amputation 05.3 Unilateral LE AKA - s/p left above knee amputation in setting of critical limb ischemia following contained AAA rupture s/p open AAA repair c/b strokes       PT, OT and SLP 60 minutes of each on a daily basis, in addition to rehab nursing and close management of physiatrist.      Impairment of ADL's: Noted to have  weakness, impaired balance,  baseline visual impairments (low vision), fatigue, impaired weight shifting, deconditioning, pain, new sternal and abdominal precautions, and LLE NWB status in setting of LLE AKA, all affecting his ability to safely and independently perform basic ADLs.  Goal for CGA or less with basic  ADLs.    Impairment of mobility:  Noted to have  weakness, impaired balance,  baseline visual impairments (low vision), fatigue, impaired weight shifting, deconditioning, pain, new sternal and abdominal precautions, and LLE NWB status in setting of LLE AKA, all affecting his ability to safely and independently perform basic mobility.  Goal for CGA or less with basic transfers and likely mixed mobility vs wheelchair based.    Impairment of cognition/language/swallow:  Noted to have moderate to severe pharyngeal dysphagia and dysphonia with goals for safe tolerance of least restrictive diet and improved cognitive-linguistic skills to meet basic needs.    Medical Conditions    Acute critical LLE ischemia s/p L AKA on 10/17/23  Hematoma in LLE residual limb (10/25/23 CTA)  In setting of ruptured AAA as below  - Prevena vac in place at discharge to ARU (exchanged 11/17); to remain in place until 11/24.  Left AKA to be wrapped with ACE at all times.  Plan for orthotics consult for  of L AKA potentially next week.  - Vascular surgery planning to remove every other staple on 11/30  - Continue Expedite daily for wound healing  - Vascular surgery will follow weekly at [facility]  - NWB LLE  - Pain management: Tylenol 975 mg TID scheduled + additional doses PRN, atarax 25 mg q6h PRN, oxycodone 5 mg q4h PRN, wean as able.  - Continue PT/OT  - Follow up with vascular surgery   - [facility], secondarily follow up with amputee clinic here  - ordered DME consult for new limb protector    Ruptured pararenal AAA s/p open repair 9/24/23 c/b shock, colonic ischemia  S/p multiple exploratory laparotomy and washout (9/25, 9/26, 9/29), fascia closure 10/2 with delayed primary skin closure on 10/20  - Wound care: Apply iodeine/betadine, allow to dry.  Reapply clean dry dressing.  OK to shower, do not scrub/soak.  Every other staple removed by vascular surgery 11/16, remainder to be removed by vascular  "surgery on 11/30  - Activity restrictions: No lifting, pushing, or pulling greater than 10 pounds and no strenuous exercise for 4-6 weeks.   - Abdominal binder for support  - Statin as below  - Pain management as above  - Vascular surgery will follow weekly at [facility]  - Follow up with vascular surgery    Acute ischemic stroke of multiple vessel territory due to embolic stroke of undetermined source (ESUS)   Code stroke 10/6 for new R facial droop and anisocoria.  CT head was without acute stroke or bleed, CTA was without proximal large vessel occlusion but did reveal concern for pulmonary embolism and had a normal perfusion scan. He was not a candidate for thrombolysis based on being outside 4.5 hours conventional time window, multiple major surgeries, thrombocytopenia and concern for GI bleed. He was not a candidate for thrombectomy considering absence of proximal large vessel occlusion.  MRI with above findings.  - BP management as below  - Apixaban as below  - Continue atorvastatin 10 mg daily  - Follow up with stroke neurology in 4-6 weeks    PE (incidental finding on CTA head/neck 10/6/23 for stroke code)  DVT LLE (U/S 10/7/23)  Initially on Heparin drip, required multiple brief holds due to bleeding concerns as below.  Ultimately transitioned to DOAC on 11/15.  - Continue apixaban 5 mg BID  - Per discharge summary, \"he will need [facility] management for cost effective co-pays, otherwise his co-pays will be very expensive.\"   - Monitor respiratory status, continue supplemental oxygen PRN to maintain O2 sats >92%    Left iliac hematoma (10/13/23 CT)  Left psoas muscle hematoma (10/24/23 CTA)  Stable on repeat imaging.  Could contribute to weakness.  - Monitor Hgb as below and signs/symptoms of worsening bleeding    Anemia, multifactorial including acute blood loss, renal disease  Thrombocytopenia  Required multiple transfusions throughout course.  Hgb stable in 7-8s.  Platelets 107 on 11/17.  - Monitor for evidence of " recurrent bleeding  - Continue to trend CBC every M/Th    Tachycardia, suspect multifactorial including debility, pain, hydration status, PE  Hx hypertension  PTA on amlodipine 5 mg, benazepril 20 mg.  Held earlier this admission due to shock.  Restarted on amlodipine and added metoprolol due to persistent tachycardia.    - Continue amlodipine 10 mg daily  - Continue metoprolol tartrate 100 mg BID  - No ACE for now per vascular surgery  - Monitor BP/HR per unit routine    MAYA, improved  Renal emboli  Baseline Cr ~1.0.  CT with scattered areas of infarct related to emboli with AAA.  Cr rising after open AAA repair, peaked >5.  Started on CRRT 9/30, transitioned to HD on 10/5, last run on 10/27, HD line removed 11/2.  Cr most recently 1.9-2.1 range.  - Avoid nephrotoxins as able  - Trend renal function every M/Th  - Follow up in CKD/post-MAYA clinic    Hyponatremia  Started around 11/9, has been in 132-134 range since then, but lower at 130 on 11/16-11/17.  - Trend BMP every M/Th  - Further work-up if persists    Left vocal fold paralysis/glottic insufficiency s/p laryngoscopy with injection laryngoplasty on 11/1  Dysphonia  Moderate-Severe Oropharyngeal Dysphagia   ENT consulted 10/28, fiberoptic endoscopy completed.  S/p injection 11/1.  - Continue SLP  - Continue soft and bite-sized (level 6) diet and moderately thick (level 3) liquids.  1:1 supervision for PO intake.  Ok for free water protocol per SLP.  - Per SLP, plan for repeat VFSS in ~2 weeks (~12/1)  - Follow up with ENT in 1 month (~12/1)    Delirium, multifactorial, improving  - Delirium precautions, sleep hygiene  - Decrease HS seroquel to 100 mg, add seroquel 25 mg BID PRN for agitation.  Wean as able at [facility].  - Continue melatonin 1 mg    Adjustment disorder with depressed mood   Seen by psychology during acute hospital stay.  Not on medications.  - Monitor mood, consult to spiritual services or health psychology if indicated    Severe malnutrition in the  context of acute illness/disease   Peripancreatic fluid collection on CT 10/13, concern possible pancreatitis, improved on repeat imaging  Previously on tube feeding, tube removed 11/13 per patient/family request.  Juvencio counts with a lot of missing information, but reportedly improving intake.  Recent fecal elastase normalized, no ongoing need for Creon (concerns for possibly pancreatitis earlier this admission).   - Continue soft and bite-sized (level 6) diet and moderately thick (level 3) liquids  - RD consulted, appreciate assistance  - Supplements per RD recs; ordered boost per patient preference    Hx bilateral legal 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  - Noted    Adjustment to disability:  Clinical psychology to eval and treat if indicated  FEN: soft and bite-sized (level 6) diet and moderately thick (level 3) liquids.  1:1 supervision for PO intake.  Bowel: continent, recent loose stools, currently on sennosides 2 tabs BID, psyllium daily.  Monitor and adjust regimen as indicated.  Bladder: continent, monitor PVRs at admission  DVT Prophylaxis: on apixaban as above  GI Prophylaxis: PPI  Code: full code  Disposition: goal for home  ELOS:  21 days  Rehab prognosis:  good  Follow up Appointments on Discharge: PCP in 1-2 weeks, vascular surgery, stroke neurology in 4-6 weeks, ENT (~12/1), nephrology[facility]        Patient was discussed with Dr. Dao Peck, PM&R staff physician.  Note was partially prepared by Zofia Bray PA-C; Physical Medicine & Rehabilitation though I did not personally examine patient on this date.    I spent 45 minutes in chart review, interviewing the patient, coordinating care, performing education, and doing documentation.  >50% of my time was spent in coordinating care and education.    Dao Peck MD

## 2023-11-17 NOTE — PLAN OF CARE
Goal Outcome Evaluation:      Plan of Care Reviewed With: patient, spouse    Overall Patient Progress: improving    Outcome Evaluation: Pt transferred to [facility] this afternoon.    Discharge to: [facility]  Transportation: Medical transport; wheelchair  Time: 1545  Prescriptions: Oxycodone script sent with patient to be filled at outside pharmacy  Belongings: Remain with patient  Access: Pt leaving with PIV in place.   Care plan and education discontinued: Yes  Paperwork:  Reviewed and questions answered.

## 2023-11-17 NOTE — PLAN OF CARE
Speech Language Therapy Discharge Summary    Reason for therapy discharge:    Discharged to acute rehabilitation facility.    Progress towards therapy goal(s). See goals on Care Plan in [facility] electronic health record for goal details.  Goals not met.  Barriers to achieving goals:   discharge from facility.    Therapy recommendation(s):    Continued therapy is recommended.  Rationale/Recommendations:  Continued ST for dysphagia and dysphonia.    Recommend continue soft and bite sized diet with moderately thick liquids as tolerated. ok for free (thin) water and ice chips between meals only after thorough oral cares. Pt to have 1:1 supervision for all PO intake and take small bites/sips. Pt will require repeat instrumental swallow study in ~2 weeks.

## 2023-11-17 NOTE — PROGRESS NOTES
Vascular Surgery Progress Note  11/17/2023       Subjective:  No acute events overnight, pending discharge to ARU.    Objective:  Temp:  [98.5  F (36.9  C)-99.6  F (37.6  C)] 98.5  F (36.9  C)  Pulse:  [] 99  Resp:  [16-18] 18  BP: (106-140)/(58-71) 134/71  SpO2:  [96 %-99 %] 99 %    I/O last 3 completed shifts:  In: 850 [P.O.:850]  Out: 980 [Urine:980]      Gen: resting comfortably in bed, answering questions appropriately, no delirium/confusion, whispering words, not in acute distress  CV: regular rate and rhythm per tele  Resp: non-labored, on room air   Abd: Abdominal incision with dressing, c/d/I.  Ext: RLE wwp, palpable DP pulse, LLE stump incision nonerythematous  Prevena LLE placed on 11/17/23 - to be replaced in 7 days  Skin: Blanching erythema resolved.     Labs:  Recent Labs   Lab 11/17/23  0557 11/16/23  0704 11/15/23  0548   WBC 7.3 8.6 9.2   HGB 7.7* 7.8* 8.3*   * 105* 117*       Recent Labs   Lab 11/16/23  0704 11/15/23  0548 11/14/23  0608   * 132* 134*   POTASSIUM 5.2 5.1 5.2   CHLORIDE 99 100 101   CO2 22 21* 21*   BUN 61.4* 56.5* 58.4*   CR 2.09* 1.93* 1.89*   * 132* 108*   OMAR 9.9 10.2 9.6   MAG 2.1 2.1 2.1   PHOS 4.5 4.5 4.8*      Assessment/Plan:   Alfredo Burnham is a 68 year old male with PMH of HTN and previous TIA who presented with R sided abdominal pain found to have contained ruptured AAA, now s/p open AAA repair 9/24 into 9/25am with 30 min supraceliac clamp time, prolonged inter-renal clamp time, temporary abdominal closure. He did have bloody stool postop with flex sig 9/25 demonstrating ischemic mucosal changes of the rectum, repeated abdominal without evidence of transmural necrosis of the small or large intestine, or the rectum. On 9/29 he was started on CRRT and subsequently iHD. S/p closure of abdominal fascia and subcutaneous tissue wound VAC applied 10/2/23. 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 On 10/20/23 underwent abdominal incision closure.      10/24/23 found to have hgb 5.8, CTA demonstrated left psoas muscle hematoma now size 6 cm x 3 cm x 3 cm, barely seen on CTA from 10/13. Lesser sac of the peritoneal cavity collection stable, hematoma in left iliac stable, no active extravasation. Hep gtt held.     Overnight 10/24-10/25 patient more lethargic with fevers at 102.1 thought to be strokelike symptoms, stroke code called, workup negative. Hemoglobin dropped again to 5.8, despite holding heparin for 24 hours. Repeat CTA: demonstrated hematoma in LLE stump, with otherwise stable left iliac hematoma and stable left psoas muscle hematoma. Now resoled: patient's hgb stable, he is progressing well with continued improvements. Off iHD with renal recovery, tunneled line removed and cleared by SLP for diet. Improving PO intake, NJT removed.     Neuro:   - Seroquel at bedtime and as needed for anxiety/delirium episodes  - Previously had stroke code called for unequal pupils, c/f facial droop 10/6: workup revealed: Numerous, punctate late hyperacute to acute infarctions thought to be embolic in nature. No embolic source identified in workup.  - Follow-up with neurology 4-6 weeks after discharge, ok with a/c.     HEENT:   - ENT consult for ongoing dysphonia/dysphagia: endoscopy with L vocal cord paralysis. S/p bedside vocal fold injection 11/1/23 with otolaryngology.     CV:   - Amlodipine 10mg daily (at home on amlodipine 5mg and benazepril 20mg). No ACE for now. Metoprolol, up to 100mg PO BID, tolerating well.    - Goal SBP <160, MAP >65  - Labetalol prn for SBP >160  - Continue statin  - PE+, venous duplex with nonocclusive to occlusive thrombus of the left GSV from the level of the knee to the groin and nonocclusive thrombus of the left distal femoral vein and popliteal veins, increased in extent compared to 9/30/2023.   - Heparin drip transitioned to Eliquis 5mg BID 11/13/23. ([facility] need [facility] for [facility])    Resp:   - On room  air    GI:   - Concern for Pancreatitis with peripancreatic fluid collection on CT 10/13 with question of bleeding into the collection. GI signed off as collection not drainable. - Improved appearance on repeat CT 10/25. Monitoring.    - Removed NJT 11/13/23 per family request, patient is committing to increase p.o. intake to achieve optimal caloric including high-protein intake.  - Nutrition following, appreciate recommendations, on calorie count.   - When stool consistency firms, will order fecal elastase levels to officially check for pancreatic insufficiency.  - Cleared by SLP For IDDSI level 6 soft and bite sized diet with level 3 moderately thickened liquids (NO straws, 1:1 observation, patient is blind, must be sitting upright, alert). FEES ordered for this morning due to silent aspirations and continued dysphonia/dysphagia despite ENT injection of vocal folds.   - Please assist with meals as much as possible due to patient vision impairment   - Change abd dressing daily  - Loose stools, resolved.     FEN:   - Electrolyte replacement prn     Renal:   - Cr 1.89, Continues to make good amount of urine.   - Last iHD 10/27, tunneled line removed 11/2/23, nephrology signed off  - Received lasix for elevated K with great response. Hyperkalemia resolved.     Heme:   - Hgb stable  - DVT as above, PE   - Heparin drip transitioned to Eliquis this morning 5mg BID 11/13/23.     ID:   -No further fevers, no leukocytosis. Patient has allergy to cefepime (hives). Continue to monitor for fevers, leukocytosis.    MSK:   - L AKA 10/17/23: prevena initially applied 10/30/23  - ACE wrap on L AKA at all times  - Orthotics provided stump protector and  11/1/23    Derm:   - Rash improved, was secondary to Cefepime.    Misc:   - Pediatric/Small tubes for blood draws (spoke with lab, included this request in daily lab draws)  - Appreciate aggressive PT/OT ROM, patient severely deconditioned and requires extensive physical  therapy: Liberalizing restrictions lifting starting 11/16/2023, activity as tolerated.  - All abdominal staples to be removed in 2 weeks on 11/30/23 by vascular surgery, removed every other staple today 11/16/2023.  - Every other staple to be removed from stump in 2 weeks by vascular surgery (same time when all abdominal staples are removed on 11/30/23).  - Abd binder on at all times.  - Please keep LLE AKA wrapped with ACE.  - ARU discharge ready since 11/13/23. PMR recommends ARU, pending bed availability.    Discussed patient with Dr. Lowe.    Miryam Carrasco, CNP  Vascular Surgery  Pager: [phone]  [email or web address]  Send message or 10 digit call back number Securely via [employer] with the Vocera Web Console (learn more here)

## 2023-11-17 NOTE — DISCHARGE SUMMARY
"Vascular Surgery Discharge Summary    NAME: Alfredo Burnham   MRN: [id number]   : 1955     DATE OF ADMISSION: 2023     PRE/POSTOPERATIVE DIAGNOSES:    Ruptured Pararenal Abdominal Aortic Aneurysm    PROCEDURES PERFORMED, 2023:   1. Resection of ruptured pararenal abdominal aortic and iliac aneurysms  2. Replacement with [id number] bypass utilizing a 20x10 bifurcated Hemagard Dacron graft  3. Endovascular balloon occlusion of the supraceliac aorta  4. Placement of the catheter and 14 Fr sheath into the aorta  5. Realtime, ultrasound guided right common femoral artery access  6. Interpretation of radiologic findings  7. Placement of Perclose device x2  8. Temporary abdominal closure    INTRAOPERATIVE FINDINGS:   Ruptured pararenal aortoiliac aneurysm with high \"shaggy\" aorta.  Profound coagulopathy and shock requiring temporary abdominal closure and necessitating abandoning left lower extremity thromboembolectomy despite poor perfusion.    DATE OF DISCHARGE: 23    HOSPITAL COURSE:   Alfredo Burnham is a 68 year old male with PMH of HTN and previous TIA who presented with R sided abdominal pain found to have contained ruptured AAA. He was emergently taken to operating room the night of 2023 to 2023 for open AAA repair requiring 30 min supraceliac clamp time, prolonged inter-renal clamp time, temporary abdominal closure. His course was complicated by hemodynamic instability requiring delayed closure of his abdomen, vasoplegia, development of left lower extremity ischemia requiring left AKA and renal failure. In chronological order, postoperatively he developed bloody stool with flex sig  demonstrating ischemic mucosal changes of the rectum, repeated abdominal imaging without evidence of transmural necrosis of the small or large intestine, or the rectum. His renal function continued to worsen and on  he was started on CRRT with subsequent iHD.  Hemodynamically " stabilized and underwent successful closure of abdominal fascia and subcutaneous tissue wound VAC applied 10/2/23. He also had L AKA on 10/17/2023. On 10/20/23 underwent abdominal incision closure and VAC removal.      On 10/24/23 found to have hgb 5.8, CTA demonstrated left psoas muscle hematoma now size 6 cm x 3 cm x 3 cm, barely seen on CTA from 10/13. Lesser sac of the peritoneal cavity collection stable, hematoma in left iliac stable, no active extravasation. At that time it was decided that heparin drip had to be held. Overnight 10/24-10/25 patient more lethargic with fevers at 102.1 thought to be strokelike symptoms, stroke code called, workup negative. Hemoglobin dropped again to 5.8, despite holding heparin for 24 hours. Repeat CTA: demonstrated hematoma in LLE stump, with otherwise stable left iliac hematoma and stable left psoas muscle hematoma. Now resolved: patient's hgb remains stable, he is progressing well with continued improvements. Off iHD with renal recovery, his tunneled line was removed and cleared by SLP for diet. Improving PO intake, NJT removed.     Neuro:   While in the ICU patient became delirious, with anxiety episodes, he was on an Precedex drip which subsequently was transitioned to Seroquel for which he remains on scheduled with as needed's.  Given his previous stroke code for unequal pupils and potential facial droop, neurology recommended following up with them 4 to 6 weeks after discharge and they were in agreement with anticoagulation.    HEENT:   Required ENT consult for ongoing dysphonia/dysphagia, endoscopy demonstrated left vocal cord paralysis for which underwent bedside vocal fold injection on 11/1/2023 with otolaryngology. Patient is also now followed by SLP for ongoing treatment and vocal cord exercises.    Cardiac:  Patient has been postoperatively tachycardic.  At home he was on amlodipine and benazepril however given change conditions we recommend against ACE inhibitors  for now thus started metoprolol which was increased up to 100 mg twice daily. His SBP goal is less than 160mmHg. Recommend continuing with low-dose statin, amlodipine 10 mg daily and metoprolol 100 mg twice daily.  Patient is known to have had PE, venous duplex with nonocclusive thrombus in the left GSV the level of the knee to the groin and nonocclusive thrombus of the left distal femoral vein and popliteal veins, increased in extent compared to 9/30/2023.  His heparin was transitioned to Eliquis 5 mg twice daily on 11/13/2023. He will need [facility] management for cost effective co-pays, otherwise his co-pays will be very expensive.    GI:  Postoperatively there has been concern for if pancreatitis with peripancreatic fluid collection on CT 10/13 with question of bleeding into the collection.  GI team was consulted at that time however they signed off as collection not drainable. - Improved appearance on repeat CT 10/25. His NJT was removed on 11/13/2023 per family request and their commitment to increasing p.o. intake.  Nutrition team has been diligently following patient on calorie count.  He was found to have silent aspirations and continued dysphonia/dysphagia, cleared  by SLP For IDDSI level 6 soft and bite sized diet with level 3 moderately thickened liquids (NO straws, 1:1 observation, patient is blind, must be sitting upright, alert). Patient is also legally blind thus requires one-to-one assistance with meals    Renal:  Mr. Burnham's baseline was Cr 1.0 on presentation but on the rise to >5 while in the ICU thus renal team was consulted for MAYA management and possible RRT.  He was on CRRT and subsequently transitioned to IHD.  He continued to have renal recovery and his last IHD was on 10/27/2023.  Subsequently his tunneled line was removed on 11/2/2023 and nephrology signed off.  He had 1 episode of hyperkalemia over the weekend which has resolved with 1 dose of Lasix.  Patient continues to make urine and he  is creatinine has plateaued at 1.8-2.  Patient will need follow-up with nephrology on an outpatient basis.    MSK:   Underwent L AKA on 10/17/2023.  Stump was closed with both sutures and staples. On 10/30/2023 Prevena was applied and this was exchanged last today 11/17/2023, this should stay on for 7 days, to be managed by vascular surgery.  We also recommend that he is left AKA should be wrapped with Ace at all times. Please consider engaging orthotics for  of left AKA potentially next week.    ID:   During this hospitalization patient found to have fever of 102.1, fungal colonies on surgical pathology with subsequent ID involvement. He was on cefepime for brief periods of time however developed maculopapular rash which was eventually deemed to be secondary to cefepime. Patient again had an episode of fevers and required 1 dose of cefepime with return of rash, this has since resolved.  He is antibiotics were stopped on 10/25/2023 without further need of treatment.    Of note: Patient has abdominal staples which will be managed by vascular surgery.  - All abdominal staples to be removed in 2 weeks on 11/30/23 by vascular surgery, removed every other staple today 11/16/2023.  - Every other staple to be removed from stump in 2 weeks by vascular surgery (same time when all abdominal staples are removed on 11/30/23).    On 11/17/23, Alfredo Burnham was discharged to ARU in stable condition.    Physical Exam:  Constitutional: alert, no acute distress and cooperative   Cardiovascular: pulses regular  Respiratory: breathing unlabored without secondary muscle use  Psychiatric: mentation appears normal and affect normal/bright  Neck: no asymmetry  GI/Abdomen: abdomen soft, non-tender. No palpable masses  MSK: able to move all extremities without weakness or ataxia  Extremities: extremities warm and well perfused  Hematology: no bruising on visible skin  Pulses: symmetric and palpable radial bilaterally, DP/PT  on right    DISCHARGE INSTRUCTIONS:  iet  - Per SLP  - Be sure to drink plenty of fluids.     Activity  - No lifting, pushing, or pulling greater than 10 pounds and no strenuous exercise for 4-6 weeks.   - No driving while on narcotic pain medications (such as oxycodonel)  - No driving until you are able to fully twist to both sides quickly and without any pain    Wound Care  - Inspect your wounds daily for signs of infection (increased redness, drainage, pain)  - Keep your wound clean and dry, inspect it daily for the above signs.  - You may shower 24 hours after your surgery, but do not soak incisions in tubs, pools, lakes for at least 14 days post-surgery. When showering, please do not scrub your incisions - let soapy water run over them, pat to dry.    Abdominal incision care:   1. Remove old dressing  2. Apply iodeine/betadine, allow to dry  3. Reapply clean dry dressing   4. Leave staples as they are, they will be managed by vascular surgery    Left Leg Stump:  Prevena applied 11/17/23: please leave as is. DO NOT REMOVE PREVENA. This will stay on for 7 days and will be removed by vascular surgery only.  ACE wrap the left leg stump at all times.    Call Vascular surgery [facility] for:  - Temperature > 101F, chills, rigors, dizziness  - Redness around or purulent drainage from wound  - Inability to tolerate diet, nausea or vomiting  - You stop passing gas, develop significant bloating, abdominal pain  - Have blood in stools/vomit  - Have severe diarrhea/constipation  - Any other questions or concerns.    Medication Instructions  Most of your medications have changed, please take medications as prescribed.    Follow up:    PCP:  Follow up with PCP in 5-7 days for post-hospitalization follow up. You may call to set this up - most clinics will be able to get you an appointment within the week if you let them know you were recently hospitalized and need to see your PCP.     Neuro:   Follow-up with neurology within 4 to 6  weeks after discharge from ARU.    Vascular:  Vascular surgery will see you at ARU weekly. Once discharged from ARU we will schedule follow up in our clinic.    With general vascular surgery questions, concerns, or to request/change an appointment, please call:      Vascular Call Center  [phone]    To contact someone after 5 pm, on a weekend, or on a Holiday, please call:  [facility]  [phone], option 4 to have the Attending Physician for Vascular Surgery Service paged.       DISCHARGE MEDICATIONS:     Review of your medicines      START taking      Dose / Directions   * acetaminophen 325 MG tablet  Commonly known as: TYLENOL  Used for: Critical limb ischemia of left lower extremity (H)      Dose: 650 mg  2 tablets (650 mg) by Per Feeding Tube route every 6 hours as needed for fever  Refills: 0     * acetaminophen 325 MG tablet  Commonly known as: TYLENOL  Used for: Critical limb ischemia of left lower extremity (H)      Dose: 975 mg  Take 3 tablets (975 mg) by mouth every 6 hours  Refills: 0     amLODIPine 10 MG tablet  Commonly known as: NORVASC  Used for: Critical limb ischemia of left lower extremity (H)      Dose: 10 mg  Start taking on: November 18, 2023  Take 1 tablet (10 mg) by mouth daily  Refills: 0     apixaban ANTICOAGULANT 5 MG tablet  Commonly known as: ELIQUIS  Indication: DVT-PE Prophylaxis  Used for: Critical limb ischemia of left lower extremity (H)      Dose: 5 mg  Take 1 tablet (5 mg) by mouth 2 times daily  Refills: 0     atorvastatin 10 MG tablet  Commonly known as: LIPITOR  Used for: Critical limb ischemia of left lower extremity (H)      Dose: 10 mg  Take 1 tablet (10 mg) by mouth every evening  Refills: 0     calcium carbonate 500 MG chewable tablet  Commonly known as: TUMS  Used for: Critical limb ischemia of left lower extremity (H)      Dose: 2 chew tab  Take 2 tablets (1,000 mg) by mouth 2 times daily as needed for heartburn  Refills: 0      diphenhydrAMINE-zinc acetate 1-0.1 % external cream  Commonly known as: BENADRYL  Used for: Critical limb ischemia of left lower extremity (H)      Apply topically 3 times daily as needed for itching or irritation  Refills: 0     * hydrOXYzine 25 MG tablet  Commonly known as: ATARAX  Used for: Critical limb ischemia of left lower extremity (H)      Dose: 25 mg  Take 1 tablet (25 mg) by mouth every 6 hours as needed for other (adjuvant pain)  Refills: 0     * hydrOXYzine 50 MG tablet  Commonly known as: ATARAX  Used for: Critical limb ischemia of left lower extremity (H)      Dose: 50 mg  Take 1 tablet (50 mg) by mouth every 6 hours as needed for other (adjuvant pain)  Refills: 0     * lipase-protease-amylase [id number] units Cpep per EC capsule  Commonly known as: CREON 24  Used for: Critical limb ischemia of left lower extremity (H)      Dose: 2 capsule  Take 2 capsules by mouth 3 times daily (with meals)  Refills: 0     * lipase-protease-amylase [id number] units Cpep per EC capsule  Commonly known as: CREON 24  Used for: Critical limb ischemia of left lower extremity (H)      Dose: 1 capsule  Take 1 capsule by mouth Take with snacks or supplements (With snacks or supplements)  Refills: 0     melatonin 1 MG Tabs tablet  Used for: Critical limb ischemia of left lower extremity (H)      Dose: 1 mg  1 tablet (1 mg) by Oral or Feeding Tube route every evening  Refills: 0     metoprolol tartrate 100 MG tablet  Commonly known as: LOPRESSOR  Used for: Critical limb ischemia of left lower extremity (H)      Dose: 100 mg  Take 1 tablet (100 mg) by mouth 2 times daily Hold for SBP<100  Refills: 0     oxyCODONE 5 MG tablet  Commonly known as: ROXICODONE  Used for: Critical limb ischemia of left lower extremity (H)      Dose: 5 mg  1 tablet (5 mg) by Oral or Feeding Tube route every 6 hours as needed for moderate pain or severe pain  Quantity: 20 tablet  Refills: 0     pantoprazole 40 MG EC tablet  Commonly  known as: PROTONIX  Used for: Critical limb ischemia of left lower extremity (H)      Dose: 40 mg  Start taking on: November 18, 2023  Take 1 tablet (40 mg) by mouth every morning (before breakfast)  Refills: 0     psyllium Packet  Commonly known as: METAMUCIL/KONSYL  Used for: Critical limb ischemia of left lower extremity (H)      Dose: 1 packet  Start taking on: November 18, 2023  Take 1 packet by mouth daily  Refills: 0     * QUEtiapine 100 MG tablet  Commonly known as: SEROquel  Used for: Critical limb ischemia of left lower extremity (H)      Dose: 100 mg  1 tablet (100 mg) by Oral or Feeding Tube route at bedtime  Refills: 0     * QUEtiapine 50 MG tablet  Commonly known as: SEROquel  Used for: Critical limb ischemia of left lower extremity (H)      Dose: 50 mg  Take 1 tablet (50 mg) by mouth at bedtime Administer at 8PM  Refills: 0     sennosides 8.6 MG tablet  Commonly known as: SENOKOT  Used for: Critical limb ischemia of left lower extremity (H)      Dose: 2 tablet  Take 2 tablets by mouth 2 times daily as needed for constipation  Refills: 0     triamcinolone 0.1 % external ointment  Commonly known as: KENALOG  Used for: Critical limb ischemia of left lower extremity (H)      Apply topically 2 times daily as needed for irritation  Refills: 0     Vitamin D3 25 mcg (1000 units) tablet  Commonly known as: CHOLECALCIFEROL  Used for: Critical limb ischemia of left lower extremity (H)      Dose: 50 mcg  Start taking on: November 18, 2023  Take 2 tablets (50 mcg) by mouth daily  Refills: 0     wound support modular Liqd cup  Commonly known as: EXPEDITE  Used for: Critical limb ischemia of left lower extremity (H)      Dose: 60 mL  Start taking on: November 18, 2023  Take 60 mLs by mouth daily  Refills: 0         * This list has 8 medication(s) that are the same as other medications prescribed for you. Read the directions carefully, and ask your doctor or other care provider to review them with you.             STOP taking    amLODIPine-benazepril 5-20 MG capsule  Commonly known as: LOTREL              Where to get your medicines      Some of these will need a paper prescription and others can be bought over the counter. Ask your nurse if you have questions.    Bring a paper prescription for each of these medications    oxyCODONE 5 MG tablet         Miryam Bacu, CNP  Vascular Surgery  Pager: [phone]  [email or web address]  Send message or 10 digit call back number Securely via [employer] with the [employer] Web Console (learn more here)'

## 2023-11-17 NOTE — PLAN OF CARE
Occupational Therapy Discharge Summary    Reason for therapy discharge:    Discharged to acute rehabilitation facility.    Progress towards therapy goal(s). See goals on Care Plan in [facility] electronic health record for goal details.  Goals partially met.  Barriers to achieving goals:   discharge from facility.    Therapy recommendation(s):    Continued therapy is recommended.  Rationale/Recommendations:  Recommend continued skilled OT services at ARU to progress IND w/ Adls/IADLs.

## 2023-11-17 NOTE — PROGRESS NOTES
"Calorie Count  Intake recorded for: 11/16  Total Kcals: 0 Total Protein: 0g  Kcals from Hospital Food: 0   Protein: 0g  Kcals from Outside Food (average):0 Protein: 0g  # Meals Ordered from Kitchen: 3  # Meals Recorded: No food intake recorded  # Supplements Recorded: (RN recorded 1 \"Boost\" at 360 calories but could not confirm nutritional information)   "

## 2023-11-17 NOTE — PLAN OF CARE
Assumed cares: 4250-4192     VS: VSS on RA except tachycardia.   Temp: 99.6  F (37.6  C)    BP: 120/71    Pulse: 100    Resp: 18    SpO2: 97 %    O2 Device: None (Room air)        LABS: Mag WNL. AM recheck.  LINES: PIV saline locked.   NEURO: A&Ox4. Withdrawn.   RESP: Labored breathing.   GI/: Voiding adequately. Small BM post senna. Poor appetite. Only had 1x boost, 1x cranberry juice.   SKIN: Wound dressings changed by vascular. ACE wrap and abdominal binder on at all times.   PAIN: Pt c/o pain in leg w/ atarax and oxycodone given x1    ACTIVITY: Pt w/ lift to chair. Standing w/ PT/OT.      PLAN: Prevena wound vac replaced on 11/13 per vascular note, to be changed on 11/20.   Rescheduled wheelchair transportation for 11/17 with a pickup window of [phone] to ARU.     Goal Outcome Evaluation:  Plan of Care Reviewed With: patient  Overall Patient Progress: no change

## 2023-11-17 NOTE — PROGRESS NOTES
Care Management Discharge Note    Discharge Date: 11/17/2023       Discharge Disposition:  [facility]    Discharge Services:  Transportation    Discharge DME:  Prevena    Discharge Transportation: [employer] [phone]    Private pay costs discussed: Not applicable    Does the patient's insurance plan have a 3 day qualifying hospital stay waiver?  No    PAS Confirmation Code:  N/A  Patient/family educated on Medicare website which has current facility and service quality ratings:  yes    Education Provided on the Discharge Plan:  yes  Persons Notified of Discharge Plans: Patient, provider, nursing staff, [facility], LIZBET  Patient/Family in Agreement with the Plan:  yes    Handoff Referral Completed: Yes    Additional Information:  LIZBET received a call from Inez Tucker with [facility], she requested transportation be pushed back an hour.    LIZBET called [facility] and rescheduled wheelchair transportation for 11/17 with a pickup window of [phone]    LIZBET updated Inez Tucker and the provider.     [facility]  [address] [address]  Ph: [phone]    SARIAH Heller  [address]   Office: [phone]  Pager: [phone]  [email or web address]

## 2023-11-18 ENCOUNTER — APPOINTMENT (OUTPATIENT)
Dept: PHYSICAL THERAPY | Facility: CLINIC | Age: 68
DRG: 559 | End: 2023-11-18
Attending: PHYSICAL MEDICINE & REHABILITATION
Payer: COMMERCIAL

## 2023-11-18 ENCOUNTER — APPOINTMENT (OUTPATIENT)
Dept: SPEECH THERAPY | Facility: CLINIC | Age: 68
DRG: 559 | End: 2023-11-18
Attending: PHYSICAL MEDICINE & REHABILITATION
Payer: COMMERCIAL

## 2023-11-18 ENCOUNTER — APPOINTMENT (OUTPATIENT)
Dept: OCCUPATIONAL THERAPY | Facility: CLINIC | Age: 68
DRG: 559 | End: 2023-11-18
Attending: PHYSICAL MEDICINE & REHABILITATION
Payer: COMMERCIAL

## 2023-11-18 PROCEDURE — 92610 EVALUATE SWALLOWING FUNCTION: CPT | Mod: GN

## 2023-11-18 PROCEDURE — 97163 PT EVAL HIGH COMPLEX 45 MIN: CPT | Mod: GP

## 2023-11-18 PROCEDURE — 128N000003 HC R&B REHAB

## 2023-11-18 PROCEDURE — 99231 SBSQ HOSP IP/OBS SF/LOW 25: CPT | Performed by: PHYSICAL MEDICINE & REHABILITATION

## 2023-11-18 PROCEDURE — 97535 SELF CARE MNGMENT TRAINING: CPT | Mod: GO | Performed by: STUDENT IN AN ORGANIZED HEALTH CARE EDUCATION/TRAINING PROGRAM

## 2023-11-18 PROCEDURE — 97162 PT EVAL MOD COMPLEX 30 MIN: CPT | Mod: GP

## 2023-11-18 PROCEDURE — 97530 THERAPEUTIC ACTIVITIES: CPT | Mod: GP

## 2023-11-18 PROCEDURE — 250N000013 HC RX MED GY IP 250 OP 250 PS 637: Performed by: PHYSICIAN ASSISTANT

## 2023-11-18 PROCEDURE — 97167 OT EVAL HIGH COMPLEX 60 MIN: CPT | Mod: GO | Performed by: STUDENT IN AN ORGANIZED HEALTH CARE EDUCATION/TRAINING PROGRAM

## 2023-11-18 RX ADMIN — APIXABAN 5 MG: 5 TABLET, FILM COATED ORAL at 07:41

## 2023-11-18 RX ADMIN — SENNOSIDES 2 TABLET: 8.6 TABLET, FILM COATED ORAL at 20:57

## 2023-11-18 RX ADMIN — PANTOPRAZOLE SODIUM 40 MG: 40 TABLET, DELAYED RELEASE ORAL at 06:15

## 2023-11-18 RX ADMIN — Medication 60 ML: at 10:03

## 2023-11-18 RX ADMIN — METOPROLOL TARTRATE 100 MG: 50 TABLET, FILM COATED ORAL at 07:41

## 2023-11-18 RX ADMIN — SENNOSIDES 2 TABLET: 8.6 TABLET, FILM COATED ORAL at 07:42

## 2023-11-18 RX ADMIN — HYDROXYZINE HYDROCHLORIDE 25 MG: 25 TABLET, FILM COATED ORAL at 23:20

## 2023-11-18 RX ADMIN — QUETIAPINE FUMARATE 100 MG: 25 TABLET ORAL at 20:53

## 2023-11-18 RX ADMIN — APIXABAN 5 MG: 5 TABLET, FILM COATED ORAL at 20:57

## 2023-11-18 RX ADMIN — ATORVASTATIN CALCIUM 10 MG: 10 TABLET, FILM COATED ORAL at 20:57

## 2023-11-18 RX ADMIN — PSYLLIUM HUSK 1 PACKET: 3.4 POWDER ORAL at 07:42

## 2023-11-18 RX ADMIN — METOPROLOL TARTRATE 100 MG: 50 TABLET, FILM COATED ORAL at 21:02

## 2023-11-18 RX ADMIN — Medication 50 MCG: at 07:41

## 2023-11-18 RX ADMIN — AMLODIPINE BESYLATE 10 MG: 10 TABLET ORAL at 07:40

## 2023-11-18 RX ADMIN — ACETAMINOPHEN 975 MG: 325 TABLET, FILM COATED ORAL at 13:51

## 2023-11-18 RX ADMIN — ACETAMINOPHEN 975 MG: 325 TABLET, FILM COATED ORAL at 07:40

## 2023-11-18 RX ADMIN — ACETAMINOPHEN 975 MG: 325 TABLET, FILM COATED ORAL at 20:53

## 2023-11-18 RX ADMIN — Medication 1 MG: at 20:57

## 2023-11-18 ASSESSMENT — ACTIVITIES OF DAILY LIVING (ADL)
ADLS_ACUITY_SCORE: 24
ADLS_ACUITY_SCORE: 28
BADLS,_PREVIOUS_FUNCTIONAL_LEVEL: INDEPENDENT
ADLS_ACUITY_SCORE: 31
ADLS_ACUITY_SCORE: 31
ADLS_ACUITY_SCORE: 41
ADLS_ACUITY_SCORE: 31
IADLS,_PREVIOUS_FUNCTIONAL_LEVEL: INDEPENDENT
ADLS_ACUITY_SCORE: 31
BADLS,_PREVIOUS_FUNCTIONAL_LEVEL: INDEPENDENT
ADLS_ACUITY_SCORE: 24
ADLS_ACUITY_SCORE: 41
IADLS,_PREVIOUS_FUNCTIONAL_LEVEL: INDEPENDENT
ADLS_ACUITY_SCORE: 24

## 2023-11-18 NOTE — PHARMACY-MEDICATION REGIMEN REVIEW
Pharmacy Medication Regimen Review  Alfredo Burnham is a 68 year old male who is currently in the Acute Rehab Unit.    Assessment: All medications have an appropriate indications, durations and no unnecessary use was found    Plan:   Avoid nephrotoxins given renal emboli; recovering MAYA  Taper Seroquel as able    Attending provider will be sent this note for review.  If there are any emergent issues noted above, pharmacist will contact provider directly by phone.      Pharmacy will periodically review the resident's medication regimen for any PRN medications not administered in > 72 hours and discontinue them. The pharmacist will discuss gradual dose reductions of psychopharmacologic medications with interdisciplinary team on a regular basis.    Please contact pharmacy if the above does not answer specific medication questions/concerns.    Background:  A pharmacist has reviewed all medications and pertinent medical history today.  Medications were reviewed for appropriate use and any irregularities found are listed with recommendations.      Current Facility-Administered Medications:     acetaminophen (TYLENOL) tablet 650 mg, 650 mg, Oral, Q6H PRN, Zofia Bray PA-C    acetaminophen (TYLENOL) tablet 975 mg, 975 mg, Oral, TID, Zofia Bray PA-C, 975 mg at 11/18/23 1351    amLODIPine (NORVASC) tablet 10 mg, 10 mg, Oral, Daily, Zofia Bray PA-C, 10 mg at 11/18/23 0740    apixaban ANTICOAGULANT (ELIQUIS) tablet 5 mg, 5 mg, Oral, BID, Zofia Bray PA-C, 5 mg at 11/18/23 0741    atorvastatin (LIPITOR) tablet 10 mg, 10 mg, Oral, QPM, Zofia Bray PA-BRIAN, 10 mg at 11/17/23 2125    calcium carbonate (TUMS) chewable tablet 500-1,000 mg, 500-1,000 mg, Oral, BID PRN, Zofia Bray PA-C    diphenhydrAMINE-zinc acetate (BENADRYL) 1-0.1 % cream, , Topical, TID PRN, Zofia Bray PA-C    docusate sodium (COLACE) capsule 50 mg, 50 mg, Oral, BID PRN, Zofia Bray  MANPREET    hydrOXYzine (ATARAX) tablet 25 mg, 25 mg, Oral, Q6H PRN, Zofia Bray PA-C    melatonin tablet 1 mg, 1 mg, Oral, QPM, Zofia Bray PA-C, 1 mg at 11/17/23 2125    metoprolol tartrate (LOPRESSOR) tablet 100 mg, 100 mg, Oral, BID, Zofia Bray PA-C, 100 mg at 11/18/23 0741    naloxone (NARCAN) injection 0.2 mg, 0.2 mg, Intravenous, Q2 Min PRN **OR** naloxone (NARCAN) injection 0.4 mg, 0.4 mg, Intravenous, Q2 Min PRN **OR** naloxone (NARCAN) injection 0.2 mg, 0.2 mg, Intramuscular, Q2 Min PRN **OR** naloxone (NARCAN) injection 0.4 mg, 0.4 mg, Intramuscular, Q2 Min PRN, Gilberto Merritt DO    oxyCODONE (ROXICODONE) tablet 5 mg, 5 mg, Oral or Feeding Tube, Q6H PRN, Zofia Bray PA-C    pantoprazole (PROTONIX) EC tablet 40 mg, 40 mg, Oral, QAM AC, Zofia Bray PA-C, 40 mg at 11/18/23 0615    Patient is already receiving anticoagulation with heparin, enoxaparin (LOVENOX), warfarin (COUMADIN)  or other anticoagulant medication, , Does not apply, Continuous PRN, Zofia Bray PA-C    polyethylene glycol (MIRALAX) Packet 17 g, 17 g, Oral, Daily PRN, Zofia Bray PA-C    psyllium (METAMUCIL/KONSYL) Packet 1 packet, 1 packet, Oral, Daily, Zofia Bray PA-C, 1 packet at 11/18/23 0742    QUEtiapine (SEROquel) tablet 100 mg, 100 mg, Oral or Feeding Tube, At Bedtime, Zofia Bray PA-C, 100 mg at 11/17/23 2123    QUEtiapine (SEROquel) tablet 25 mg, 25 mg, Oral, BID PRN, Zofia Bray PA-C    sennosides (SENOKOT) tablet 2 tablet, 2 tablet, Oral, BID, Zofia Bray PA-C, 2 tablet at 11/18/23 0742    Vitamin D3 (CHOLECALCIFEROL) tablet 50 mcg, 50 mcg, Oral, Daily, Zofia Bray PA-C, 50 mcg at 11/18/23 0741    wound support modular (EXPEDITE) cup 60 mL, 60 mL, Oral, Daily, Zofia Bray PA-C, 60 mL at 11/18/23 1003  No current outpatient prescriptions on file.   PMH: hypertension, TIA, 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,  thrombocytopenia, and tobacco use disorder

## 2023-11-18 NOTE — PLAN OF CARE
Discharge Planner Post-Acute Rehab PT:     Discharge Plan: Home with spouse, HH vs OP PT    Precautions: LLE NWB, sternal    Current Status:  Bed Mobility: Supine to sit, max A. Sit to supine, min A.  Transfer: Max A with transfer board  Gait: NA  Stairs: NA  Balance: Fair sitting, requires occasional min A.     Assessment: Pt presents with mobility deficits secondary to extensive diagnoses and prolonged hospital course. He has cognitive and motor planning deficits noted throughout session with functional mobility but is able to follow direction with extra time and repeated instructions. He has flat affect but is engaged and does not show lethargic behavior as previously noted during hospital course. ELOS 2.5 weeks, discharge home with wife and DME as needed.     Other Barriers to Discharge (DME, Family Training, etc):   DME - wheelchair, transfer board, potential walker for transfers  Family training

## 2023-11-18 NOTE — PLAN OF CARE
Discharge Planner Post-Acute Rehab OT:     Discharge Plan: home with assist as needed and HH OT    Precautions: fall, sternal, abdominal, NWB of LLE, wound vac on LLE, moderate thick liquids    Current Status:  ADLs:  Mobility: Wc based, slide board Ax1, liko lift with nursing   Grooming: set up seated  Dressing: UB: TBD, LB max A in supine  Bathing: TBD  Toileting: Liko to commode  IADLs: Wife can assist  Vision/Cognition: baseline visual deficits as pt is legally blind, need to further assess cognition.    Assessment: Pt will benefit from skilled OT, needs to work on compensatory strategies for ADL, figure out safest transfer and recommend DME. Anticipate 2.5 weeks pending progress.     Other Barriers to Discharge (DME, Family Training, etc): Family training, DME

## 2023-11-18 NOTE — PLAN OF CARE
Goal Outcome Evaluation:         Patient alert and oriented, able to make needs known   slept most of the night,   No complained of pain, headache, chest pain, N&V, no SOB  Continent of Bladder, urinal at bedside, no BM  V/S taken and recorded, no respiratory distress, afebrile  Safety rounding checked completed, 3 side rails UP, bed alarm ON, call light/bedside table within reach  Plan of care ongoing

## 2023-11-18 NOTE — PROGRESS NOTES
"   11/18/23 1600   Appointment Info   Signing Clinician's Name / Credentials (SLP) Marquita Russ MS CCC-SLP   General Information   Onset of Illness/Injury or Date of Surgery 09/24/23   Referring Physician Zofia Bray PA-C   Pertinent History of Current Problem per H&P:\"68 year old right hand dominant male with past medical history of hypertension, TIA, 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, thrombocytopenia, and tobacco use disorder who presented on 9/24/23 with severe abdominal pain radiating to back, found to have contained, ruptured abdominal aortic aneurysm.  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 \" SLP consulted to assess/tx dysphagia   General Observations Pt seen by SLP for dysphagia during acute hospitalization. most recent FEES completed 11/13, see below for details. ENT completed VF injection during acute hosptialization that has not appeared to improve aphonia. Per chart review, pt not agreeable to additional intervention but planning to f/u in OP. Pt admitted on soft bite size (6), moderately thick (3) liquid.   Pain Assessment   Patient Currently in Pain No   Type of Evaluation   Type of Evaluation Swallow Evaluation   Oral Motor   Oral Musculature generally intact   Dentition (Oral Motor)   Dentition (Oral Motor) natural dentition;adequate dentition   Facial Symmetry (Oral Motor)   Facial Symmetry (Oral Motor) WNL   Lip Function (Oral Motor)   Lip Range of Motion (Oral Motor) WNL   Lip Strength (Oral Motor) bilateral;mild impairment   Tongue Function (Oral Motor)   Tongue Strength (Oral Motor) WNL   Tongue ROM (Oral Motor) WNL   Jaw Function (Oral Motor)   Jaw Function (Oral Motor) WNL   Facial Sensation   Facial Sensation WNL   Cough/Swallow/Gag Reflex (Oral " "Motor)   Volitional Throat Clear/Cough (Oral Motor) WNL   Vocal Quality/Secretion Management (Oral Motor)   Vocal Quality (Oral Motor) aphonia   Secretion Management (Oral Motor) WNL   General Swallowing Observations   Past History of Dysphagia most recent FEES completed 11/13:\"FEES evaluation completed per MD order. Flexible scope passed via the right nares to sit above the soft palate and obtain a superior view of the pharynx, larynx, and UES. Significant thick, sticky secretions observed throughout pharynx at onset of eval. swelling noted throughout pharynx and laryngeal vestibule and reduced movement of L VF noted, which is consistent with dysphonia. Pt presented with trials of ice chips, mildly-thick liquids, moderately-thick liquids and purees. pre-spillage and significant pharyngeal residuals noted with ice chips and mildly-thick liquid trials. Post swallow residuals noted in posterior commisure and were aspirated post swallow. Pt does not sense this, but is able to partially clear with cues. Similar results noted with moderately thick liquids with attempt to turn head to left (to see if this would improve VF closure). Improved pharyngeal clearance with moderately-thick liquids with head facing forward (without use of head turn strategy). Swallow functional for moderately thick liquids and purees; reduced pharyngeal residuals noted and no penetration/aspiration observed.\"   Respiratory Support room air   Current Diet/Method of Nutritional Intake (General Swallowing Observations, NIS) moderately thick (honey-thick) liquids (level 3);soft and bite-sized (dysphagia advanced) (level 6)   Swallowing Evaluation Clinical swallow evaluation   Clinical Swallow Evaluation   Feeding Assistance set up only required   Clinical Swallow Evaluation Textures Trialed moderately thick liquids/liquidized;soft & bite-sized;solid foods;thin liquids   Clinical Swallow Eval: Thin Liquid Texture Trial   Mode of Presentation, Thin " Liquids fed by clinician   Volume of Liquid or Food Presented ice chips x5   Oral Phase of Swallow WFL   Pharyngeal Phase of Swallow wet vocal quality after swallow   Diagnostic Statement completed following oral cares   Clinical Swallow Eval: Moderately Thick Liquids   Mode of Presentation self-fed;cup   Volume Presented 2 oz   Oral Phase WFL   Pharyngeal Phase wet vocal quality after swallow   Diagnostic Statement mild wet vocal quality following large sip liquid   Clinical Swallow Eval: Soft & Bite Sized   Mode of Presentation self-fed   Volume Presented 4 tsp   Oral Phase impaired mastication;delayed AP movement   Pharyngeal Phase intact   Clinical Swallow Evaluation: Solid Food Texture Trial   Mode of Presentation self-fed   Volume Presented cracker   Oral Phase impaired mastication;delayed AP movement;effortful AP movement   Pharyngeal Phase throat clearing   Esophageal Phase of Swallow   Patient reports or presents with symptoms of esophageal dysphagia No   Swallowing Recommendations   Diet Consistency Recommendations soft & bite-sized (level 6);moderately thick liquids/liquidized (level 3)   Supervision Level for Intake 1:1 supervision needed   Mode of Delivery Recommendations bolus size, small;no straws;slow rate of intake   Swallowing Maneuver Recommendations supraglottic swallow;throat clear-swallow   Monitoring/Assistance Required (Eating/Swallowing) stop eating activities when fatigue is present;monitor for cough or change in vocal quality with intake   Recommended Feeding/Eating Techniques (Swallow Eval) maintain upright sitting position for eating;minimize distractions during oral intake;provide assist with feeding;set-up and prepare tray   Instrumental Assessment Recommendations FEES (fiberoptic endoscopic evaluation of swallowing);VFSS (videofluoroscopic swallowing study)   General Therapy Interventions   Planned Therapy Interventions Dysphagia Treatment   Dysphagia treatment Oropharyngeal exercise  training;Modified diet education;Instruction of safe swallow strategies;Compensatory strategies for swallowing   Clinical Impression   Criteria for Skilled Therapeutic Interventions Met (SLP Eval) Yes, treatment indicated   SLP Diagnosis mod oropharyngeal dysphagia   Activity Limitations Related to Problem List (SLP) aspiration   Risks & Benefits of therapy have been explained evaluation/treatment results reviewed;care plan/treatment goals reviewed;risks/benefits reviewed;current/potential barriers reviewed;participants voiced agreement with care plan;participants included;patient   Clinical Impression Comments SLP: Pt seen on ARU for dysphagia assessment. Oral motor functions grossly intact. Pt seen with regular, soft bite size (6) texture, moderately thick (3) liquid and ice chips. Completed oral cares prior to icechips. Pt with mild wet vocal quality following ice, appearing to resolve with cued cough. per chart review, pt has been on free water protocol (FWP) during acute hospitalization but demonstrates mild difficulty with full comprehension of directions and follow through. SLP provided re education. Pt with prolonged mastication, delayed AP transit, min oral residuals. Single instance cough following more dry texture otherwise no s/sx aspiration. very minimal instances of  wet vocal quality following larger sips of 3 liquid by cup. no s/sx aspiration with small single sip. SLP provided education re: plan for therapy and goals. Pt reported polite refusal for repeat VF injection by ENT and FEES. Pt understanding rationale for both services but declined any future participation. Pt continues to demonstrate below baseline level of function and will benefit from skilled SLP to tx areas of impairment and modify diet as clinically or instrumentally indicated.   SLP Total Evaluation Time   Eval: oral/pharyngeal swallow function, clinical swallow Minutes (27985) 60   SLP Goals   Therapy Frequency (SLP Eval) daily    SLP Predicted Duration/Target Date for Goal Attainment 12/09/23   SLP Goals Swallow   SLP Discharge Planning   SLP Plan SLP: f/u diet, trial 7 as indicated. f/u FWP with liquids. oropharyngeal exercises, into log. determine if cog eval indicated? repeat VFSS in ~2 weeks or earlier   Total Session Time   Total Session Time (sum of timed and untimed services) 60   SLP - Acute Rehab Center Time   Individual Time (minutes) - enter zero if not applicable - SLP 60   Group Time (minutes) - enter zero if not applicable  - SLP 0   Concurrent Time (minutes) - enter zero if not applicable  - SLP 0   Co-Treatment Time (minutes) - enter zero if not applicable  - SLP 0   ARC Total Session Time (minutes) - SLP 60   ARC Total Session Time   OT/PT/SLP ARC Total Session Time 60

## 2023-11-18 NOTE — PROGRESS NOTES
"   11/18/23 5648   Appointment Info   Signing Clinician's Name / Credentials (PT) Cheri Watt, PT, DPT   Living Environment   People in Home spouse   Current Living Arrangements apartment   Home Accessibility wheelchair accessible   Transportation Anticipated family or friend will provide   Living Environment Comments lives with spouse in accessible senior living apartment. Wife is home during the day. Elevator from the garage with no steps or significant thresholds. Doorways are at least 36\" with ample hallway navigation. Standing shower with bar. Grab bar being installed in toilet area.   Self-Care   Usual Activity Tolerance good   Current Activity Tolerance poor   Regular Exercise Yes   Activity/Exercise Type walking   Exercise Amount/Frequency 1 hr   Equipment Currently Used at Home none   Fall history within last six months no   Activity/Exercise/Self-Care Comment wife drives. pt goes out into the community for errands. pt does the cooking and cleaning in the house.   Post-Acute Assessment Only   Post-Acute Functional Assessment See below   Previous Level of Function/Home Environm   Bathing, Previous Functional Level independent   Grooming, Previous Functional Level independent   Dressing, Previous Functional Level independent   Eating/Feeding, Previous Functional Level independent   Toileting, Previous Functional Level independent   BADLs, Previous Functional Level independent   IADLs, Previous Functional Level independent   Bed Mobility, Previous Functional Level independent   Transfers, Previous Functional Level independent   Household Ambulation, Previous Functional Level independent   Stairs, Previous Functional Level independent   Community Ambulation, Previous Functional Level independent   General Information   Onset of Illness/Injury or Date of Surgery 09/24/23  (initial admission)   Referring Physician Dr. 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 Ruptured pararenal AAA s/p open repair 9/24/23 c/b shock, colonic ischemia s/p multiple exploratory laparotomy and washout (9/25, 9/26, 9/29), fascia closure 10/2 with delayed primary skin closure on 10/20. Acute ischemic stroke of multiple vessel territory due to embolic stroke of undetermined source (ESUS). PE. Anemia. Tachycardia, hypertension. MAYA, improved, renal emboli. Hyponatremia. Left vocal fold paralysis/glottic insufficiency s/p laryngoscopy with injection laryngoplasty on 11/1, Dysphonia, Moderate-Severe Oropharyngeal Dysphagia. Delirium, multifactorial, improving. Adjustment disorder with depressed mood. Severe malnutrition, previously on tube feeding, tube removed 11/13 per patient/family request. B legal 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.   Existing Precautions/Restrictions fall;sternal;weight bearing   Weight-Bearing Status - LLE nonweight-bearing   Cognition   Affect/Mental Status (Cognition) WFL   Cognitive Status Comments Pt presents with cognitive deficits including slow to respond and extra time to respond, inconsistent responses with H&P questions/history issues.   Pain Assessment   Patient Currently in Pain No   Integumentary/Edema   Integumentary/Edema Comments wound vac to L LE   Posture    Posture Protracted shoulders   Range of Motion (ROM)   ROM Comment WFL   Strength (Manual Muscle Testing)   Strength Comments grossly 4/5 to R LE   Balance   Balance other (describe)   Balance Comments Sitting requires occasional min A for stability   Sensory Examination   Sensory Perception Comments no deficits noted with light/dull tough to R LE   Coordination   Coordination Comments WFL   Clinical Impression   Criteria for Skilled Therapeutic Intervention Yes, treatment " indicated   PT Diagnosis (PT) impaired functional mobility   Influenced by the following impairments muscular strength deficits, sitting and standing balance deficits, endurance deficits, residual limb precautions, wheelchair mobility deficits   Functional limitations due to impairments impaired bed mobility, transfers, sitting and standing balance, endurance   Clinical Presentation (PT Evaluation Complexity) unstable   Clinical Presentation Rationale significantly complex acute medical history, cognitive impact on functional mobility progression   Clinical Decision Making (Complexity) high complexity   Planned Therapy Interventions (PT) balance training;bed mobility training;gait training;groups;home exercise program;manual therapy techniques;neuromuscular re-education;orthotic fitting/training;patient/family education;postural re-education;ROM (range of motion);strengthening;stretching;transfer training;wheelchair management/propulsion training;progressive activity/exercise;risk factor education;home program guidelines   Risk & Benefits of therapy have been explained evaluation/treatment results reviewed;care plan/treatment goals reviewed;risks/benefits reviewed;participants included;patient;current/potential barriers reviewed;participants voiced agreement with care plan   Clinical Impression Comments 68 year old male admitted s/p L AKA, multiple abdominal surgeries. Cognitive deficts noted with functional mobility that may impact LOS. ELOS is 2.5 weeks with goals for wheelchair mobility at household level. Predicted  OP vs HH PT.   PT Total Evaluation Time   PT Eval, High Complexity Minutes (17932) 30   Physical Therapy Goals   PT Frequency Daily   PT Predicted Duration/Target Date for Goal Attainment 12/06/23   PT Goals Bed Mobility;Transfers;Wheelchair Mobility;PT Goal 1;PT Goal 2   PT: Bed Mobility Supervision/stand-by assist   PT: Transfers Supervision/stand-by assist   PT: Wheelchair Mobility 150 feet;manual  wheelchair;Caregiver SBA   PT: Goal 1 car transfer at supervision level   PT: Goal 2 pt will reclal 3 fall prevention methods for safe discharge home   Therapeutic Activity   Therapeutic Activities: dynamic activities to improve functional performance Minutes (08366) 15   Treatment Detail/Skilled Intervention chair to bed, transfer board max A, continued wheelchair propulsion, wheelchair selection. education on rehab set up, pt role and POC. pt has fatigue with wheelchair propulsion and during transfers.   PT Discharge Planning   PT Plan GG: car transfer   Total Session Time   Timed Code Treatment Minutes 15   Total Session Time (sum of timed and untimed services) 45   Post Acute Settings Only   What unit is patient on? Acute Rehab   PT - Acute Rehab Center Time   Individual Time (minutes) - enter zero if not applicable - PT 45   Group Time (minutes) - enter zero if not applicable  - PT 0   Concurrent Time (minutes) - enter zero if not applicable  - PT 0   Co-Treatment Time (minutes) - enter zero if not applicable  - PT 0   ARC Total Session Time (minutes) - PT 45   ARC Total Session Time   OT/PT/SLP ARC Total Session Time 45   Roll Left and Right   Physical Assistance Level 50%-74%   Comment rolling with rail, supervision, rolling wtihout rail max A   Roll Left to Right CARE Score 2   Sit to Lying   Physical Assistance Level 25%-49%   Comment min A for LE into bed   Sit to Lying CARE Score 3   Lying to Sitting on Side of Bed   Physical Assistance Level 50%-74%   Comment bed flat, no rail. max A for B LE and trunk assist.   Lying to Sitting CARE Score 2     Sit to Stand   Comment max A for attempt during tranfsers, poor completion due to single LE support, unable to achieve, would require a second person to re attempt   Reason if not Attempted Medical concerns   Sitting to Standing CARE Score 88                  Walk 10 Feet   Comment poor standing ability due to recent L LE AKA   Reason if not Attempted Medical  concerns   Walk 10 Ft. CARE Score 88   Walk 50 Feet with Two Turns   Comment poor standing ability due to recent L LE AKA   Reason if not Attempted Medical concerns   Walk 50 Ft. CARE Score 88   Walk 150 Feet   Comment poor standing ability due to recent L LE AKA   Reason if not Attempted Medical concerns   Walk 150 Ft. CARE Score 88   Walking 10 Feet on Uneven Surfaces   Comment poor standing ability due to recent L LE AKA   Reason if not Attempted Medical concerns   Walking 10 Feet on Uneven Surfaces CARE Score 88   Wheel 50 Feet with Two Turns   Assistance Needed Adaptive equipment   Physical Assistance Level Less than 25%   Comment min A during first turn to avoid obstacle   Wheel 50 Feet with Two Turns CARE Score 3   Wheel 150 Feet   Assistance Needed Adaptive equipment   Physical Assistance Level Less than 25%   Wheel 150 Feet CARE Score 3   1 Step (Curb)   Comment poor standing ability due to recent L LE AKA   Reason if not Attempted Medical concerns   1 Step CARE Score 88   4 Steps   Comment no stairs at home, recent L LE AKA   Reason if not Attempted Activity not applicable   4 Steps CARE Score 9   12 Steps   Comment no stairs at home, recent L LE AKA   Reason if not Attempted Activity not applicable   12 Steps CARE Score 9   Picking Up Object   Comment poor standing balance with recent L LE AKA   Reason if not Attempted Medical concerns    CARE Score 88

## 2023-11-18 NOTE — PLAN OF CARE
Discharge Planner Post-Acute Rehab SLP:     Discharge Plan: home with assist,  SLP. ENT for further VF follow up     Precautions: fall, aspiration, sternal     Current Status:  Hearing: WFL  Vision: impaired   Communication: aphonic. VF injection completed by ENT   Cognition: not formally assessed. Pt denies any increased difficulty/changes to cognition. Will monitor need for formal assessment  Swallow: Soft bite size (6), moderately thick (3) liquid close supervision Fully upright all PO, small single sips/bites. Ok for ice chips via free water protocol (FWP)    Assessment:\ Pt seen on ARU for dysphagia assessment. Oral motor functions grossly intact. Pt seen with regular, soft bite size (6) texture, moderately thick (3) liquid and ice chips. Completed oral cares prior to icechips. Pt with mild wet vocal quality following ice, appearing to resolve with cued cough. per chart review, pt has been on free water protocol (FWP) during acute hospitalization but demonstrates mild difficulty with full comprehension of directions and follow through. SLP provided re education. Pt with prolonged mastication, delayed AP transit, min oral residuals. Single instance cough following more dry texture otherwise no s/sx aspiration. very minimal instances of wet vocal quality following larger sips of 3 liquid by cup. no s/sx aspiration with small single sip. SLP provided education re: plan for therapy and goals. Pt indicated politely refusal for any repeat VF injection by ENT in future and FEES. Pt understanding rationale for both services but declined any future participation. Pt continues to demonstrate below baseline level of function and will benefit from skilled SLP to tx areas of impairment and modify diet as clinically or instrumentally indicated. Recommend continue current diet (6,3) with small single sip/bites, fully upright, close supervision     Other Barriers to Discharge (Family Training, etc): family training: diet  pending progress

## 2023-11-18 NOTE — PROGRESS NOTES
"   11/18/23 0648   Appointment Info   Signing Clinician's Name / Credentials (OT) Ashely Simmons   Living Environment   People in Home spouse   Current Living Arrangements apartment   Transportation Anticipated family or friend will provide   Living Environment Comments lives with spouse in accessible senior living apartment.  Elevator from the garage with no steps or significant thresholds.  Doorways are at least 36\" with ample hallway navigation. Standing shower with bar.  Grab bar being installed in toilet area.   Self-Care   Usual Activity Tolerance good   Current Activity Tolerance fair   Equipment Currently Used at Home grab bar, tub/shower;grab bar, toilet   Fall history within last six months yes   Number of times patient has fallen within last six months 3   Activity/Exercise/Self-Care Comment PLOF was IND. Pt coolks and cleans, wife helps. Pt like to garden zinne[place]. Pt walked daily.. Pt reports he has a history of falling due to dizziness.   Previous Level of Function/Home Environm   Bathing, Previous Functional Level independent   Grooming, Previous Functional Level independent   Dressing, Previous Functional Level independent   Eating/Feeding, Previous Functional Level independent   Toileting, Previous Functional Level independent   BADLs, Previous Functional Level independent   IADLs, Previous Functional Level independent   Bed Mobility, Previous Functional Level independent   Transfers, Previous Functional Level independent   Household Ambulation, Previous Functional Level independent   Stairs, Previous Functional Level independent   General Information   Onset of Illness/Injury or Date of Surgery 09/24/23   Referring Physician Dr Gilberto milian   Patient/Family Therapy Goal Statement (OT) to go home and be able ot get around   Additional Occupational Profile Info/Pertinent History of Current Problem Per chart \"68 year old right hand dominant male with a past medical history of hypertension, TIA, " "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, thrombocytopenia, and tobacco use disorder who was admitted on 9/24/23 with ruptured pararenal abdominal aortic aneurysm s/p open repair complicated by shock and colonic ischemic requiring multiple returns to OR for exploratory laparotomy, washout, and delayed closure with hospital course complicated by LLE ischemia ultimately 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, hematochezia with concern for possible bowel ischemia (no intramural ischemia on ex lap), MAYA requiring CRRT/HD (now off), acute hypoxic respiratory failure requiring 2 separate intubations, PE/DVT, aspiration event, bilateral pleural effusions, acute blood loss anemia/thrombocytopenia (requiring multiple transfusions), multifocal embolic strokes, left psoas muscle and iliac hematomas, peripancreatic fluid collection and concern for possible pancreatitis, left vocal fold paralysis s/p injection 11/1, fevers, volume overload, delirium, tachycardia, exanthematous drug eruption/rash, malnutrition, dysphagia, hyperglycemia, loose stools, and multiple additional electrolyte abnormalities. He is now admitted to ARU on 11/17/23 for multidisciplinary rehabilitation and ongoing medical management.\"   Existing Precautions/Restrictions sternal;abdominal;swallowing  (soft and bite size diet and mod thick liquid)   Left Lower Extremity (Weight-bearing Status) non weight-bearing (NWB)   General Observations and Info Connected to [facility]   Cognitive Status Examination   Cognitive Status Comments Oriented to partial date and place. Will continue to assess functional cognition   Visual Perception   Impact of Vision Impairment on Function (Vision) Pt is legally blind  at baseline due to retinal dystrophy   Sensory   Sensory Comments Pt denies   Pain Assessment   Patient Currently in Pain No   Range of Motion Comprehensive   Comment, General Range of Motion BUE slightliy limited in bilateal shoulders. otherwise WFL   Strength Comprehensive (MMT)   Comment, " General Manual Muscle Testing (MMT) Assessment deconditioned overall 4/5 in UE   Coordination   Coordination Comments vision impacts hand eye coordinaiton   Clinical Impression   Criteria for Skilled Therapeutic Interventions Met (OT) Yes, treatment indicated   OT Diagnosis ADL and mobility deficits   Influenced by the following impairments strength, post op precautions, endurance, fatigue, cognition, swallow, vision   OT Problem List-Impairments impacting ADL problems related to;activity tolerance impaired;cognition;mobility;strength;post-surgical precautions;pain;vision   ADL comments/analysis deficits impact all ADL and mobility   Assessment of Occupational Performance 5 or more Performance Deficits   Identified Performance Deficits deficits impact all ADL and mobility   Planned Therapy Interventions (OT) ADL retraining;IADL retraining;cognition;strengthening;transfer training;progressive activity/exercise   Clinical Decision Making Complexity (OT) comprehensive assessment/high complexity   Risk & Benefits of therapy have been explained evaluation/treatment results reviewed;care plan/treatment goals reviewed   Clinical Impression Comments Pt is significantly below baseline with complicated hospital course and many precautions. Pt will benefit from skilled OT.   OT Total Evaluation Time   OT Eval, High Complexity Minutes (31280) 15   OT Goals   Therapy Frequency (OT) Daily   OT Predicted Duration/Target Date for Goal Attainment 12/06/23   OT: Upper Body Dressing Modified independent   OT: Lower Body Dressing Modified independent   OT: Upper Body Bathing Supervision/stand-by assist   OT: Lower Body Bathing Supervision/stand-by assist   OT: Transfer Supervision/stand-by assist  (walk in shower transfer)   OT: Toilet Transfer/Toileting Modified independent;toilet transfer;cleaning and garment management   OT: Cognitive Patient/caregiver will verbalize understanding of cognitive assessment results/recommendations as  needed for safe discharge planning   Self-Care/Home Management   Self-Care/Home Mgmt/ADL, Compensatory, Meal Prep Minutes (02586) 45   Treatment Detail/Skilled Intervention Educated pt on OT role and POC. ADL completed as well as transfer training. Attempoted tostand with pt but unafe at this time. Assisted with management of wound vac during transfers. Instructed pt on slide board set up and pt completed 3 transfers with min/mod A.   OT Discharge Planning   OT Plan slideboard to commode, other ADL. Shower on monday   Total Session Time   Timed Code Treatment Minutes 45   Total Session Time (sum of timed and untimed services) 60   Post Acute Settings Only   What unit is patient on? Acute Rehab   OT - Acute Rehab Center Time   Individual Time (minutes) - enter zero if not applicable - OT 60   Group Time (minutes) - enter zero if not applicable  - OT 0   Concurrent Time (minutes) - enter zero if not applicable  - OT 0   Co-Treatment Time (minutes) - enter zero if not applicable  - OT 0   ARC Total Session Time (minutes) - OT 60   ARC Total Session Time   OT/PT/SLP ARC Total Session Time 60   Comprehension   Comprehension Comment needs repetition   Expression   Expression Comment soft spoken   Bladder   Bladder Comment assist for use of urinal   Oral Hygiene   Describe performance set up seated   Grooming (except oral cares)   Grooming Comment setup seated   Lower Body Dressing (Pants/Undergarments)   Describe performance max A in supine   Tub / Shower Transfer   Tub/Shower Transfer Comment unsafe at this time   Chair/bed-to-chair Transfer   Describe performance slide board Ax1

## 2023-11-18 NOTE — PLAN OF CARE
Goal Outcome Evaluation:    Outcome Evaluation: Pt AxO, able to make needs known. VSS, denied pain. On soft bite-sized, moderately thickened liquid diet. Appetite fair to poor. Able to take his pills whole with thickened liquid. L AKA dressing in place, CDI. Wound vac in place. Cannister on portable wound vac full this AM shift, current wound vac not available in the premises. Vascular team paged c/o CN and gave orders to replace wound vac with the portable one available to the unit. Replacement cannister filled up as well and a second cannister in place. Vascular team paged c/o CN for drainage monitoring/parameter orders, still awaiting for orders. Endorsed accordingly to incoming NOD. A2 with the Liko lift, able to use the urinal. Had BM this AM shift, bedpan utilized. Participated in therapies. Continue with POC.

## 2023-11-18 NOTE — PROGRESS NOTES
[facility], [place]   Physical Medicine and Rehabilitation Daily Note           Assessment and Plan of Care:   Alfredo Burnham is a 68 year old male with a past medical history of hypertension, TIA, 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, thrombocytopenia, and tobacco use disorder who was admitted on 9/24/23 with ruptured pararenal abdominal aortic aneurysm s/p open repair complicated by shock and colonic ischemic requiring multiple returns to OR for exploratory laparotomy, washout, and delayed closure with hospital course complicated by LLE ischemia ultimately 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, hematochezia with concern for possible bowel ischemia (no intramural ischemia on ex lap), MAYA requiring CRRT/HD (now off), acute hypoxic respiratory failure requiring 2 separate intubations, PE/DVT, aspiration event, bilateral pleural effusions, acute blood loss anemia/thrombocytopenia (requiring multiple transfusions), multifocal embolic strokes, left psoas muscle and iliac hematomas, peripancreatic fluid collection and concern for possible pancreatitis, left vocal fold paralysis s/p injection 11/1, fevers, volume overload, delirium, tachycardia, exanthematous drug eruption/rash, malnutrition, dysphagia, hyperglycemia, loose stools, and multiple additional electrolyte abnormalities.  He is now admitted to ARU on 11/17/23 for multidisciplinary rehabilitation and ongoing medical management.     --Vitals stable. No labs today.  --Continue ongoing medical management.  --Continue therapies and plan of care.           Interval history:   Patient seen and examined at bedside. Per nursing report, no acute events overnight. His wound vac has been beeping, and on trouble-shooting indicates that the vac was either full, kinked or leaking. Upon further evaluation, the wound vac was found to be full.     Today, patient states that he is doing ok. He wonders if the stump  is too tight. Denies fever, chills,  "CP, SOB, N/V, abdominal pain, new pain or weakness/numbness/tingling. States that the pain in his residual limb is well-controlled.     The patient is fully participating in therapies and is making progress. He is undergoing initial evaluations today, as he was admitted yesterday.           Physical Exam:   VS:   Vitals:    11/17/23 1605 11/17/23 2122 11/18/23 0200 11/18/23 0730   BP: 136/74 139/74 106/67 136/74   BP Location: Right arm Right arm Right arm Right arm   Patient Position: Semi-Carpenter's Semi-Carpenter's     Cuff Size: Adult Regular Adult Regular     Pulse: 99 102 79 101   Resp: 16 16 16 16   Temp: 98.6  F (37  C)  97.4  F (36.3  C) 98  F (36.7  C)   TempSrc: Oral  Oral Oral   SpO2: 97% 98% 100% 99%   Weight: 64.4 kg (141 lb 15.6 oz)      Height: 1.702 m (5' 7\")        Gen: NAD, resting comfortably in bed, hypophonic speech  Lungs: breathing unlabored on room air  Ext: Left AKA, stump  in place  MSK/neuro: Alert, speech fluent, moves all four extremities volitionally.          Data:   Scheduled meds   acetaminophen  975 mg Oral TID    amLODIPine  10 mg Oral Daily    apixaban ANTICOAGULANT  5 mg Oral BID    atorvastatin  10 mg Oral QPM    melatonin  1 mg Oral QPM    metoprolol tartrate  100 mg Oral BID    pantoprazole  40 mg Oral QAM AC    psyllium  1 packet Oral Daily    QUEtiapine  100 mg Oral or Feeding Tube At Bedtime    sennosides  2 tablet Oral BID    Vitamin D3  50 mcg Oral Daily    wound support modular  60 mL Oral Daily       PRN meds:  acetaminophen, calcium carbonate, diphenhydrAMINE-zinc acetate, docusate sodium, hydrOXYzine, naloxone **OR** naloxone **OR** naloxone **OR** naloxone, oxyCODONE, - MEDICATION INSTRUCTIONS -, polyethylene glycol, QUEtiapine      Lizet Fajardo MD  Physical Medicine and Rehabilitation     I spent a total of 25 minutes face-to-face and managing the care of Alfredo Burnham. Over 50% of my time on the unit was spent counseling the patient and " coordinating care. Please see note for details.

## 2023-11-19 ENCOUNTER — APPOINTMENT (OUTPATIENT)
Dept: OCCUPATIONAL THERAPY | Facility: CLINIC | Age: 68
DRG: 559 | End: 2023-11-19
Attending: PHYSICAL MEDICINE & REHABILITATION
Payer: COMMERCIAL

## 2023-11-19 ENCOUNTER — APPOINTMENT (OUTPATIENT)
Dept: PHYSICAL THERAPY | Facility: CLINIC | Age: 68
DRG: 559 | End: 2023-11-19
Attending: PHYSICAL MEDICINE & REHABILITATION
Payer: COMMERCIAL

## 2023-11-19 ENCOUNTER — APPOINTMENT (OUTPATIENT)
Dept: SPEECH THERAPY | Facility: CLINIC | Age: 68
DRG: 559 | End: 2023-11-19
Attending: PHYSICAL MEDICINE & REHABILITATION
Payer: COMMERCIAL

## 2023-11-19 PROCEDURE — 250N000013 HC RX MED GY IP 250 OP 250 PS 637: Performed by: PHYSICIAN ASSISTANT

## 2023-11-19 PROCEDURE — 97535 SELF CARE MNGMENT TRAINING: CPT | Mod: GO | Performed by: STUDENT IN AN ORGANIZED HEALTH CARE EDUCATION/TRAINING PROGRAM

## 2023-11-19 PROCEDURE — 128N000003 HC R&B REHAB

## 2023-11-19 PROCEDURE — 97110 THERAPEUTIC EXERCISES: CPT | Mod: GP

## 2023-11-19 PROCEDURE — 97530 THERAPEUTIC ACTIVITIES: CPT | Mod: GP

## 2023-11-19 PROCEDURE — 96125 COGNITIVE TEST BY HC PRO: CPT | Mod: GN

## 2023-11-19 PROCEDURE — 92526 ORAL FUNCTION THERAPY: CPT | Mod: GN

## 2023-11-19 RX ADMIN — APIXABAN 5 MG: 5 TABLET, FILM COATED ORAL at 09:55

## 2023-11-19 RX ADMIN — ATORVASTATIN CALCIUM 10 MG: 10 TABLET, FILM COATED ORAL at 21:48

## 2023-11-19 RX ADMIN — APIXABAN 5 MG: 5 TABLET, FILM COATED ORAL at 21:49

## 2023-11-19 RX ADMIN — AMLODIPINE BESYLATE 10 MG: 10 TABLET ORAL at 09:55

## 2023-11-19 RX ADMIN — METOPROLOL TARTRATE 100 MG: 50 TABLET, FILM COATED ORAL at 09:55

## 2023-11-19 RX ADMIN — SENNOSIDES 2 TABLET: 8.6 TABLET, FILM COATED ORAL at 09:56

## 2023-11-19 RX ADMIN — ACETAMINOPHEN 975 MG: 325 TABLET, FILM COATED ORAL at 14:18

## 2023-11-19 RX ADMIN — Medication 60 ML: at 10:26

## 2023-11-19 RX ADMIN — ACETAMINOPHEN 650 MG: 325 TABLET, FILM COATED ORAL at 17:40

## 2023-11-19 RX ADMIN — METOPROLOL TARTRATE 100 MG: 50 TABLET, FILM COATED ORAL at 21:47

## 2023-11-19 RX ADMIN — ACETAMINOPHEN 975 MG: 325 TABLET, FILM COATED ORAL at 09:55

## 2023-11-19 RX ADMIN — PSYLLIUM HUSK 1 PACKET: 3.4 POWDER ORAL at 09:56

## 2023-11-19 RX ADMIN — QUETIAPINE FUMARATE 100 MG: 25 TABLET ORAL at 21:48

## 2023-11-19 RX ADMIN — PANTOPRAZOLE SODIUM 40 MG: 40 TABLET, DELAYED RELEASE ORAL at 07:31

## 2023-11-19 RX ADMIN — SENNOSIDES 2 TABLET: 8.6 TABLET, FILM COATED ORAL at 21:48

## 2023-11-19 RX ADMIN — Medication 1 MG: at 21:49

## 2023-11-19 RX ADMIN — ACETAMINOPHEN 975 MG: 325 TABLET, FILM COATED ORAL at 21:47

## 2023-11-19 RX ADMIN — Medication 50 MCG: at 09:55

## 2023-11-19 ASSESSMENT — ACTIVITIES OF DAILY LIVING (ADL)
ADLS_ACUITY_SCORE: 41
ADLS_ACUITY_SCORE: 41
ADLS_ACUITY_SCORE: 35
ADLS_ACUITY_SCORE: 41
ADLS_ACUITY_SCORE: 35
ADLS_ACUITY_SCORE: 41
ADLS_ACUITY_SCORE: 41

## 2023-11-19 NOTE — PLAN OF CARE
Discharge Planner Post-Acute Rehab SLP:     Discharge Plan: home with assist,  SLP. ENT for further VF follow up     Precautions: fall, aspiration, sternal     Current Status:  Hearing: WFL  Vision: impaired; uses magnifying glass to read large print  Communication: aphonic. VF injection completed by ENT   Cognition: Informal assessment.  Pt did not have magnifying device at time of session.  Further assessment indicated.  Pt demonstrates mild recall deficits.  Other disciplines seeing cognitive impairments  Swallow: Soft bite size (6), moderately thick (3) liquid close supervision Fully upright all PO, small single sips/bites. Ok for ice chips via free water protocol (FWP)    Assessment: Cognitive/Lingustic Assessment:  Pt seen on ARU for cognitive/linguistic assessment. This date, assessment was informal, as pt reported not being able to do visual tasks. Later in the day, pts wife was present and reports that pt is able to do modified visual tasks with use of magnifying glass. Pt will benefit from further structured assessment tasks to obtain more complete information on pts cognitive function. Pt demonstrates adequate expressive/receptive language, vocal quality is severely affected. Pt has had an injection to vocal fold but reports no improvement. Non visual portion of CLQT involving recall of short story is minimally impaired. Functional math task/mental manipulation with moderate impairment in thought organization. Pt is currenlty below baseline level of function and will benefit from skilled SLP intervention to further analyze pt's cogntiive/linguistic function and address areas of impairment.     PM:  Pt consumed macaroni and cheese with green beans with no difficulty. Intake is poor, as pt states some of the food makes causes a gag. No clinical indicators of aspiration.     Other Barriers to Discharge (Family Training, etc): family training: diet pending progress

## 2023-11-19 NOTE — PLAN OF CARE
Goal Outcome Evaluation:       Overall Patient Progress: no changeOverall Patient Progress: no change    Outcome Evaluation: Denies pain the whole shift. Seen by cardiovascular surgeon (Dr. Florentino), prevena wound vac removed and pressure dressing done with ace wraps on L leg stump area. Appetite poor, ate less than 25% of dinner served. Took boost drink supplement (360ml) at bedtime with med pass. Continent of bladder and bowel. LBM: tonight 11/18. Not OOB the whole shift, turned to sides. Able to make his needs konwn. used his call light appropriately and waited for assistance.    Awake at shift change and complaining of stump pain 5/10. Intermittent, sharp pain. PRN oxycodone offered but pt refused. PRN Atarax given. Repositioned and made comfortable in bed. Reported to noc RN.

## 2023-11-19 NOTE — PROGRESS NOTES
"   11/19/23 1575   Appointment Info   Signing Clinician's Name / Credentials (SLP) Dominga Nguyen MS, CCC-SLP   General Information   Onset of Illness/Injury or Date of Surgery 09/24/23   Referring Physician Zofia Bray PA-C   Pertinent History of Current Problem per H&P:\"68 year old right hand dominant male with past medical history of hypertension, TIA, 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, thrombocytopenia, and tobacco use disorder who presented on 9/24/23 with severe abdominal pain radiating to back, found to have contained, ruptured abdominal aortic aneurysm.  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 \" SLP consulted to assess/tx dysphagia   General Observations Pt alert and agreeable for cognitive/linguistic assessment this date.   Pain Assessment   Patient Currently in Pain No   Type of Evaluation   Type of Evaluation Speech, Language, Cognition   Oral Motor   Oral Musculature generally intact   Vocal Quality/Secretion Management (Oral Motor)   Vocal Quality (Oral Motor) aphonia   Secretion Management (Oral Motor) WNL   Motor Speech   Comment, Motor Speech Assessment Pt is aphonic   Auditory Comprehension   Follows Commands (Auditory Comprehension) 2-step commands;3-step commands   Paragraph Comprehension (Auditory Comprehension) intact   Comment, Assessment (Auditory Comprehension) Mild processing delay   Yes/No Questions (Auditory Comprehension) WNL   3 Step, Follows Commands (Auditory Comprehension) over 90% accuracy   2 Step, Follows Commands (Auditory Comprehension) over 90% accuracy   Verbal Expression   Comment, Assessment (Verbal Expression) WNL   Reading Comprehension   Comment, Assessment (Reading Comprehension) Pt reports that he does no reading, however when pt's " wife was present it was reported that with magnifying glass, pt does manage to read larger print.   Written Language   Comment, Assessment (Written Language) DNT   Cognition   Cognitive Function attention deficit;executive function deficit;memory deficit;safety deficit   Cognitive Status Mild cognitive impairments observed with PT/OT   Orientation Status (Cognition) oriented x 4   Affect/Mental Status (Cognition) flat/blunted affect   Safety Deficit (Cognition) moderate deficit   Executive Function Deficit (Cognition) moderate deficit   Attention Deficit (Cognition) minimal deficit   Memory Deficit (Cognition) minimal deficit   General Therapy Interventions   Planned Therapy Interventions Cognitive Treatment   Cognitive treatment Internal memory strategy training;Progressive attention training  (Executive function training)   Clinical Impression   Criteria for Skilled Therapeutic Interventions Met (SLP Eval) Yes, treatment indicated   Activity Limitations Related to Problem List (SLP) Ability to return to prior activites/level of independence   Risks & Benefits of therapy have been explained evaluation/treatment results reviewed;care plan/treatment goals reviewed;risks/benefits reviewed;current/potential barriers reviewed;participants voiced agreement with care plan;participants included;patient   Clinical Impression Comments SLP: Pt seen on ARU for cognitive/linguistic assessment. This date, assessment was informal, as pt reported not being able to do visual tasks. Later in the day, pts wife was present and reports that pt is able to do modified visual tasks with use of magnifying glass. Pt will benefit from further structured assessment tasks to obtain more complete information on pts cognitive function. Pt demonstrates adequate expressive/receptive language, vocal quality is severely affected. Pt has had an injection to vocal fold but reports no improvement. Non visual portion of CLQT involving recall of short  story is minimally impaired. Functional math task/mental manipulation with moderate impairment in thought organization. Pt is currenlty below baseline level of function and will benefit from skilled SLP intervention to further analyze pt's cogntiive/linguistic function and address areas of impairment.   SLP Total Evaluation Time   Cognitive  Performance Testing Minutes, per hour - includes time for administering test, interp results & prep report (85169) 30   SLP Goals   Therapy Frequency (SLP Eval) daily   SLP Predicted Duration/Target Date for Goal Attainment 12/09/23   SLP Goals SLP Goal 1;SLP Goal 2   SLP Discharge Planning   SLP Plan SLP: portions of standardized assessment possible with magnifier, f/u diet, trial 7 as indicated. f/u FWP with liquids. oropharyngeal exercises, into log. determine, repeat VFSS in ~2 weeks or earlier   SLP - Acute Rehab Center Time   Individual Time (minutes) - enter zero if not applicable - SLP 30   Group Time (minutes) - enter zero if not applicable  - SLP 0   Concurrent Time (minutes) - enter zero if not applicable  - SLP 0   Co-Treatment Time (minutes) - enter zero if not applicable  - SLP 0   ARC Total Session Time (minutes) - SLP 30   ARC Total Session Time   OT/PT/SLP ARC Total Session Time 30

## 2023-11-19 NOTE — PROGRESS NOTES
Notified by the nursing staff and on call Intern about prevena on left amputation stump with continued bloody drainage. Patient seen and examined. Patient and wife reported a fall and trauma to the stump close to a week ago. There is a dark bloody product in the canister that appears to be hematoma degration product. Prevena was taken off, and the incision was examined. Most of the incisions appear to be intact in sutures and Staples. S mall hematoma noted. No signs of infection. However, there s one punctate spot with venous oozing versus, a flow of liquefied hematoma products. With possibility of this being venous oozing, prevena was discontinued as this will not achieve hemostasis due to its continuous negative pressure on a bleeder if present. Applied dry dressing with an ace wrap to the stump for hemostasis.    Discussed with Dr. Lowe

## 2023-11-19 NOTE — CONSULTS
"Social Work: Initial Assessment with Discharge Plan    Patient Name: Alfredo Burnham  : 1955  Age: 68 year old  MRN: [id number]  Completed assessment with: Chart review and interview with patient   Admitted to ARU: 2023    Presenting Information   Date of SW assessment: 2023  Health Care Directive: Declined completing  Primary Health Care Agent: Patient/self   Secondary Health Care Agent: AMARISK  Living Situation: Pt lives with spouse in accessible senior living apartment. Wife is home during the day. Elevator from the garage with no steps or significant thresholds. Doorways are at least 36\" with ample hallway navigation. Standing shower with bar. Grab bar being installed in toilet area.   Previous Functional Status: Pt was independent with all ADLs/IADLs, transfers, mobility and gait.  He sustained about 3 falls a year, all mechanical but from normal walking without tripping on objects.  He doesn't use a cane or walker. Wife drives. pt goes out into the community for errands. pt does the cooking and cleaning in the house.   DME available: See therapy evaluation for more information   Patient and family understanding of hospitalization: appropriate   Cultural/Language/Spiritual Considerations: Pt is a 68 yr old male,, , english speaking, Yarsanism josé       Physical Health  Reason for admission: Amputation 05.3 Unilateral LE AKA - s/p left above knee amputation in setting of critical limb ischemia following contained AAA rupture s/p open AAA repair c/b strokes       Provider Information   Primary Care Physician:Yuki Wright [place] [phone] [facility] [address] [facility] [id number]  [facility] will schedule PCP apt at discharge.   : none    Mental Health/Chemical Dependency:   Diagnosis: Per chart review, Psych consult was completed 11/10/23 and diagnosed with Adjustment disorder with depressed mood. Health psychology to follow patient approximately once " "per week for the duration of their hospitalization.     Alcohol/Tobacco/Narcotis: Pt denies Alcohol/Tobacco/Narcotic use.  Support/Services in Place: none  Services Needed/Recommended: [place] and Health Psychology support while on ARU available.   Sexuality/Intimacy: Not discussed     Support System  Marital Status:   Family support: Tabby (spouse) is at home and can provide support. Jaky (daughter), Génesis (daughter), John Paul (son) are all local.   Other support available: na    Community Resources  Current in home services: Pt denies having services.  Previous services: na    Financial/Employment/Education  Employment Status: retired [employer]   Income Source: SSI and correction   Education: not discussed  Financial Concerns:  Pt denies having financial concerns   Insurance: ARE/[employer] MEDICARE     Discharge Plan   Patient and family discharge goal: His goal is to discharge home in a timely manner.TBD  Provided Education on discharge plan: Evaluations and discharge recommendations pending.   Patient agreeable to discharge plan:  Pending further discussion. Evaluations and discharge recommendations pending.   Provided education and attained signature for Medicare IM and IRF Patient Rights and Privacy Information provided to patient : Yes  Provided patient with [place] [facility] [place] Resources:  NA  Barriers to discharge: TBD    Discharge Recommendations   Disposition: See above   Transportation Needs: family    Additional comments   Discharge AGUSTINABARRY 21 days     Per chart review, his wife said that as a  he could benefit from [facility] services.     SW will remain available and continue to follow as needs arise.       -------------------------------------------------------------------------------------------------------------  [facility]ARY Pain Assessment    Pain Effect on Sleep  Over the past 5 days, how much of the time has pain made it hard for you to sleep at night?\"    2. Occasionally  0 - Does " "not apply - I have not had any pain or hurting in the past 5 days  1 - Rarely or nor at all  3 - Frequently   4 - Almost Constantly  8 - Unable to Answer    Pain Interference with Therapy Activities  \"Over the past 5 days, how often have you limited your participation in rehabilitation therapy sessions due to pain?\"  2. Occasionally  0 - Does not apply - I have not received rehabilitation therapy in the past 5 days  1 - Rarely or nor at all  3 - Frequently   4 - Almost Constantly  8 - Unable to Answer    Pain Interference with Day-to-Day Activities  \"Over the past 5 days, how often have you limited your day-to-day activities (excluding rehabilitation therapy sessions) because of pain?\"  2. Occasionally  1 - Rarely or nor at all  3 - Frequently   4 - Almost Constantly  8 - Unable to Answer  -------------------------------------------------------------------------------------------------------------    JACINTO Cruz   [facility]    Social Work  [address] [place] [phone]  [phone] or [phone])           "

## 2023-11-19 NOTE — PLAN OF CARE
Discharge Planner Post-Acute Rehab OT:     Discharge Plan: home with assist as needed and HH OT    Precautions: fall, sternal, abdominal, NWB of LLE, wound vac on LLE, moderate thick liquids    Current Status:  ADLs:  Mobility: Wc based, slide board Ax1, Ax2 with slide board for nursing  Grooming: set up seated  Dressing: UB: TBD, LB max A in supine  Bathing: TBD  Toileting: slide board bed to commode or Wc to commode over the toilet with Ax1 in therapy, Ax2 with nursing  IADLs: Wife can assist  Vision/Cognition: baseline visual deficits as pt is legally blind, need to further assess cognition.    Assessment: Problem solving transfers and pt does best with slide board to WC and commode. Pt requires assist for set up and up to mod A depending on which direction he is going. Updated room board for nursing. Recommend Ax 2 for nursing. Shower assessment to be completed tomorrow.    Other Barriers to Discharge (DME, Family Training, etc): Family training, DME

## 2023-11-19 NOTE — PLAN OF CARE
FOCUS/GOAL  Medical management    ASSESSMENT, INTERVENTIONS AND CONTINUING PLAN FOR GOAL:  Awake at shift change, patient  seemed anxious and restless. He reported same pain, received PRN atarax from previous nurse. He slept a little after.  Did not void this shift, Bladder scan at the end of the shift was 178. Had a BM per report.. AKA dressing done.   Goal Outcome Evaluation:      Plan of Care Reviewed With: other (see comments)    Overall Patient Progress: no changeOverall Patient Progress: no change    Outcome Evaluation: No change this shift.

## 2023-11-19 NOTE — PLAN OF CARE
Goal Outcome Evaluation:    Outcome Evaluation: Pt AxO, able to make needs known. VSS, denied pain. L AKA wound dressing changed at the beginning of the shift, sticky note left for wound orders. On Level 6, level 3 diet. Able to take his pills whole in moderately thickened liquid. Now A2 with the slide board to bed, w/c, or BSC, right side dominant. Continent of bowel and bladder, able to use the urinal, bedpan, and bedside commode. No BM reported this shift. Continue with POC.

## 2023-11-19 NOTE — PLAN OF CARE
Discharge Planner Post-Acute Rehab PT:     Discharge Plan: Home with spouse, HH vs OP PT    Precautions: LLE NWB, sternal    Current Status:  Bed Mobility: Supine to sit, max A. Sit to supine, min A.  Transfer: Mod to max A transfer board and squat pivot  Gait: NA  Stairs: NA  Balance: Fair sitting, requires occasional min A.     Assessment: Pt continues to have motor planning deficits with transfers, unclear on cognitive versus vision deficits as primary contributor. He expresses overwhelm with the situation and takes extra time to process information given to him.    Other Barriers to Discharge (DME, Family Training, etc):   DME - wheelchair, transfer board, potential walker for transfers  Family training

## 2023-11-20 ENCOUNTER — APPOINTMENT (OUTPATIENT)
Dept: PHYSICAL THERAPY | Facility: CLINIC | Age: 68
DRG: 559 | End: 2023-11-20
Attending: PHYSICAL MEDICINE & REHABILITATION
Payer: COMMERCIAL

## 2023-11-20 ENCOUNTER — APPOINTMENT (OUTPATIENT)
Dept: SPEECH THERAPY | Facility: CLINIC | Age: 68
DRG: 559 | End: 2023-11-20
Attending: PHYSICAL MEDICINE & REHABILITATION
Payer: COMMERCIAL

## 2023-11-20 ENCOUNTER — APPOINTMENT (OUTPATIENT)
Dept: OCCUPATIONAL THERAPY | Facility: CLINIC | Age: 68
DRG: 559 | End: 2023-11-20
Attending: PHYSICAL MEDICINE & REHABILITATION
Payer: COMMERCIAL

## 2023-11-20 LAB
ANION GAP SERPL CALCULATED.3IONS-SCNC: 8 MMOL/L (ref 7–15)
BUN SERPL-MCNC: 58.7 MG/DL (ref 8–23)
CALCIUM SERPL-MCNC: 9.4 MG/DL (ref 8.8–10.2)
CHLORIDE SERPL-SCNC: 100 MMOL/L (ref 98–107)
CREAT SERPL-MCNC: 1.96 MG/DL (ref 0.67–1.17)
DEPRECATED HCO3 PLAS-SCNC: 25 MMOL/L (ref 22–29)
EGFRCR SERPLBLD CKD-EPI 2021: 36 ML/MIN/1.73M2
ERYTHROCYTE [DISTWIDTH] IN BLOOD BY AUTOMATED COUNT: 15.7 % (ref 10–15)
GLUCOSE SERPL-MCNC: 102 MG/DL (ref 70–99)
HCT VFR BLD AUTO: 24.4 % (ref 40–53)
HGB BLD-MCNC: 7.8 G/DL (ref 13.3–17.7)
MAGNESIUM SERPL-MCNC: 2 MG/DL (ref 1.7–2.3)
MCH RBC QN AUTO: 30.6 PG (ref 26.5–33)
MCHC RBC AUTO-ENTMCNC: 32 G/DL (ref 31.5–36.5)
MCV RBC AUTO: 96 FL (ref 78–100)
PHOSPHATE SERPL-MCNC: 4.7 MG/DL (ref 2.5–4.5)
PLATELET # BLD AUTO: 142 10E3/UL (ref 150–450)
POTASSIUM SERPL-SCNC: 4.6 MMOL/L (ref 3.4–5.3)
RBC # BLD AUTO: 2.55 10E6/UL (ref 4.4–5.9)
SODIUM SERPL-SCNC: 133 MMOL/L (ref 135–145)
WBC # BLD AUTO: 7.1 10E3/UL (ref 4–11)

## 2023-11-20 PROCEDURE — 99232 SBSQ HOSP IP/OBS MODERATE 35: CPT | Performed by: PHYSICIAN ASSISTANT

## 2023-11-20 PROCEDURE — 97530 THERAPEUTIC ACTIVITIES: CPT | Mod: GP | Performed by: PHYSICAL THERAPIST

## 2023-11-20 PROCEDURE — 92526 ORAL FUNCTION THERAPY: CPT | Mod: GN

## 2023-11-20 PROCEDURE — 128N000003 HC R&B REHAB

## 2023-11-20 PROCEDURE — 97535 SELF CARE MNGMENT TRAINING: CPT | Mod: GO | Performed by: STUDENT IN AN ORGANIZED HEALTH CARE EDUCATION/TRAINING PROGRAM

## 2023-11-20 PROCEDURE — 83735 ASSAY OF MAGNESIUM: CPT | Performed by: PHYSICIAN ASSISTANT

## 2023-11-20 PROCEDURE — 97110 THERAPEUTIC EXERCISES: CPT | Mod: GP | Performed by: PHYSICAL THERAPIST

## 2023-11-20 PROCEDURE — 80048 BASIC METABOLIC PNL TOTAL CA: CPT | Performed by: PHYSICIAN ASSISTANT

## 2023-11-20 PROCEDURE — 36415 COLL VENOUS BLD VENIPUNCTURE: CPT | Performed by: PHYSICIAN ASSISTANT

## 2023-11-20 PROCEDURE — 99024 POSTOP FOLLOW-UP VISIT: CPT | Performed by: NURSE PRACTITIONER

## 2023-11-20 PROCEDURE — 85027 COMPLETE CBC AUTOMATED: CPT | Performed by: PHYSICIAN ASSISTANT

## 2023-11-20 PROCEDURE — 84100 ASSAY OF PHOSPHORUS: CPT | Performed by: PHYSICIAN ASSISTANT

## 2023-11-20 PROCEDURE — 250N000013 HC RX MED GY IP 250 OP 250 PS 637: Performed by: PHYSICIAN ASSISTANT

## 2023-11-20 RX ORDER — SENNOSIDES 8.6 MG
1 TABLET ORAL 2 TIMES DAILY
Status: DISCONTINUED | OUTPATIENT
Start: 2023-11-20 | End: 2023-12-01 | Stop reason: HOSPADM

## 2023-11-20 RX ADMIN — APIXABAN 5 MG: 5 TABLET, FILM COATED ORAL at 09:45

## 2023-11-20 RX ADMIN — ACETAMINOPHEN 975 MG: 325 TABLET, FILM COATED ORAL at 09:45

## 2023-11-20 RX ADMIN — OXYCODONE HYDROCHLORIDE 5 MG: 5 TABLET ORAL at 20:29

## 2023-11-20 RX ADMIN — ACETAMINOPHEN 975 MG: 325 TABLET, FILM COATED ORAL at 14:23

## 2023-11-20 RX ADMIN — APIXABAN 5 MG: 5 TABLET, FILM COATED ORAL at 20:29

## 2023-11-20 RX ADMIN — SENNOSIDES 2 TABLET: 8.6 TABLET, FILM COATED ORAL at 09:45

## 2023-11-20 RX ADMIN — ATORVASTATIN CALCIUM 10 MG: 10 TABLET, FILM COATED ORAL at 20:30

## 2023-11-20 RX ADMIN — Medication 1 MG: at 20:30

## 2023-11-20 RX ADMIN — PSYLLIUM HUSK 1 PACKET: 3.4 POWDER ORAL at 09:48

## 2023-11-20 RX ADMIN — ACETAMINOPHEN 975 MG: 325 TABLET, FILM COATED ORAL at 20:29

## 2023-11-20 RX ADMIN — METOPROLOL TARTRATE 100 MG: 50 TABLET, FILM COATED ORAL at 09:45

## 2023-11-20 RX ADMIN — QUETIAPINE FUMARATE 100 MG: 25 TABLET ORAL at 20:28

## 2023-11-20 RX ADMIN — Medication 50 MCG: at 09:45

## 2023-11-20 RX ADMIN — METOPROLOL TARTRATE 100 MG: 50 TABLET, FILM COATED ORAL at 20:43

## 2023-11-20 RX ADMIN — AMLODIPINE BESYLATE 10 MG: 10 TABLET ORAL at 09:45

## 2023-11-20 RX ADMIN — PANTOPRAZOLE SODIUM 40 MG: 40 TABLET, DELAYED RELEASE ORAL at 05:09

## 2023-11-20 ASSESSMENT — ACTIVITIES OF DAILY LIVING (ADL)
ADLS_ACUITY_SCORE: 41
ADLS_ACUITY_SCORE: 35
ADLS_ACUITY_SCORE: 35
ADLS_ACUITY_SCORE: 37

## 2023-11-20 NOTE — DISCHARGE INSTRUCTIONS
Amputation Resources:   [employer]  https://www.[employer].org/  PH: [phone]  Indra Vaca PH: [phone]  [email or web address]  Local agency with support, resources, financial assistance, home modifications and education    Balance Amputation Support Group   Tuesday nights at 6:30pm  [facility]  [address] [place] [address]  Held by: Rachel Fry PH: [phone] (a bilateral amputee as well, great resource and contact for additional support)     Amputation Coalition   https://www.amputee-coalition.org/resources/[place]-2/  PH: [phone]  National agency where you can find support groups, connect with peers and get education

## 2023-11-20 NOTE — PLAN OF CARE
Discharge Planner Post-Acute Rehab OT:     Discharge Plan: home with assist as needed and HH OT    Precautions: fall, sternal, abdominal, NWB of LLE, wound vac on LLE, moderate thick liquids    Current Status:  ADLs:  Mobility: Wc based, slide board Ax1, Ax2 with slide board for nursing  Grooming: set up seated  Dressing: UB: set up, LB max A in supine  Bathing: Ax2 using slideboard to rolling blue and white shower chair, min A for bathing seated  Toileting: slide board bed to commode or Wc to commode over the toilet with Ax1 in therapy, Ax2 with nursing  IADLs: Wife can assist  Vision/Cognition: baseline visual deficits as pt is legally blind, need to further assess cognition.    Assessment: Shower assessment completed. Use slide board to rolling blue and white shower chair.     Other Barriers to Discharge (DME, Family Training, etc): Family training, DME

## 2023-11-20 NOTE — PROGRESS NOTES
Vascular Surgery Progress Note  11/20/2023       Subjective:  In the process of working with physical therapy this morning during this visit.  Feeling well.  Noted he has been working diligently with PT/OT.  Had episode of bleeding from stump over the weekend.  No further episodes over the last 24 hours.    Objective:  Temp:  [98.3  F (36.8  C)] 98.3  F (36.8  C)  Pulse:  [] 112  Resp:  [18] 18  BP: (123-130)/(65-81) 130/81  SpO2:  [97 %-98 %] 98 %    I/O last 3 completed shifts:  In: -   Out: 850 [Urine:850]      Gen: up in chair, answering questions appropriately, whispering words, not in acute distress  CV: regular rate per radial pulse  Resp: non-labored, on room air   Abd: Abdominal incision with dressing, c/d/I.  Ext: RLE wwp, palpable DP pulse, LLE stump incision nonerythematous  Prevena LLE placed on 11/20/23 - to be replaced in 7 days  Skin: Blanching erythema resolved.     Labs:  Recent Labs   Lab 11/20/23  0617 11/17/23  0557 11/16/23  0704   WBC 7.1 7.3 8.6   HGB 7.8* 7.7* 7.8*   * 107* 105*       Recent Labs   Lab 11/20/23  0617 11/17/23  0557 11/16/23  0704   * 130* 130*   POTASSIUM 4.6 4.8 5.2   CHLORIDE 100 100 99   CO2 25 20* 22   BUN 58.7* 58.2* 61.4*   CR 1.96* 1.97* 2.09*   * 118* 114*   OMAR 9.4 9.5 9.9   MAG 2.0 2.0 2.1   PHOS 4.7* 4.1 4.5      Assessment/Plan:   Alfredo Burnham is a 68 year old male with PMH of HTN and previous TIA who presented with R sided abdominal pain found to have contained ruptured AAA, now s/p open AAA repair 9/24 into 9/25am with 30 min supraceliac clamp time, prolonged inter-renal clamp time, temporary abdominal closure. He did have bloody stool postop with flex sig 9/25 demonstrating ischemic mucosal changes of the rectum, repeated abdominal without evidence of transmural necrosis of the small or large intestine, or the rectum. On 9/29 he was started on CRRT and subsequently iHD. S/p closure of abdominal fascia and subcutaneous tissue  wound VAC applied 10/2/23. 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 On 10/20/23 underwent abdominal incision closure.     10/24/23 found to have hgb 5.8, CTA demonstrated left psoas muscle hematoma now size 6 cm x 3 cm x 3 cm, barely seen on CTA from 10/13. Lesser sac of the peritoneal cavity collection stable, hematoma in left iliac stable, no active extravasation. Hep gtt held.     Overnight 10/24-10/25 patient more lethargic with fevers at 102.1 thought to be strokelike symptoms, stroke code called, workup negative. Hemoglobin dropped again to 5.8, despite holding heparin for 24 hours. Repeat CTA: demonstrated hematoma in LLE stump, with otherwise stable left iliac hematoma and stable left psoas muscle hematoma. Now resoled: patient's hgb stable, he is progressing well with continued improvements. Off iHD with renal recovery, tunneled line removed and cleared by SLP for diet. Improving PO intake, NJT removed. Transferred to ARU on 11/17/2023.  Vascular surgery was called over the weekend for episode of bleeding from LLE stump.  Upon assessment this morning LLE stump dry, with 1.5 x 1 cm dry blood on dressing, no other bleeding noted on dressing.    -Reapplied Prevena dressing on LLE stump followed by compression wrap  -Please leave Prevena dressing as is for 7 days, to be managed by vascular surgery.  Please do not hesitate to contact us immediately should questions or new bleeding episodes arise.    Of note, vascular surgery will continue to follow patient at ARU once or twice per week.    - All abdominal staples to be removed in 2 weeks on 11/30/23 by vascular surgery, removed every other staple today 11/16/2023.  - Every other staple to be removed from stump in 2 weeks by vascular surgery (same time when all abdominal staples are removed on 11/30/23).  - Abd binder on at all times.  - Please keep LLE AKA wrapped with ACE.    Discussed patient with Dr. Lowe.    Miryam Carrasco, [facility]  Vascular Surgery  Pager:  [phone]  [email or web address]  Send message or 10 digit call back number Securely via [employer] with the [employer] Web Console (learn more here)

## 2023-11-20 NOTE — PLAN OF CARE
Discharge Planner Post-Acute Rehab SLP:     Discharge Plan: home with assist,  SLP. ENT for further VF follow up     Precautions: fall, aspiration, sternal     Current Status:  Hearing: WFL  Vision: impaired; uses magnifying glass to read large print  Communication: aphonic. VF injection completed by ENT   Cognition: Mild cognitive impairment re: attention, memory, executive functions. Informal assessment complete and pt limited by vision. Noting impaired cognition across other disiplines     Swallow: Soft bite size (6), moderately thick (3) liquid close supervision Fully upright all PO, small single sips/bites. Ok for ice chips via free water protocol (FWP)    Assessment: Pt seen with meal trial easy chew (7), moderately thick (3) liquid by cup. Pt with prolonged mastication, delayed AP transit, no oral residuals. Good rate of intake, no impulsivity or fatigue despite extra time. Pt reports limited appetite but enjoyed flavor in advanced texture. Single instance cough following sip liquid otherwise no s/sx aspiration. recommend continue current diet, soft bite size (6), 3 liquid. set up assist and int supervision. Plan to conduct additional trial to warrant further solid advancement. Repeat VFSS (pt declining FEES) indicated prior to any liquid advancement     Other Barriers to Discharge (Family Training, etc): family training: diet pending progress, IADL support

## 2023-11-20 NOTE — PLAN OF CARE
Goal Outcome Evaluation:      Plan of Care Reviewed With: patient    Overall Patient Progress: no change    Outcome Evaluation: Pt is alert and oriented x4 , c/o L AKA pain prn tylenol given and pt verbalized relief. On soft bite size diet , level 3 liquids. Assist of x2 SB to wc/commode and bed. Continent of bowel and bladder, LBM 11/18. New dressing applied  to L AKA. Call light within arm's reach, continue POC.

## 2023-11-20 NOTE — PROGRESS NOTES
"  [facility]   Acute Rehabilitation Unit  Daily progress note    INTERVAL HISTORY  Weekend and therapy notes reviewed, no acute events reported.  Did have an episode of bleeding of L AKA site, vascular surgery evaluated and removed wound vac temporarily.    Alfredo Burnham was seen and examined at bedside this morning.  He reports to be feeling ok overall.  Has some ongoing pain in left leg, but adequate pain control on current regimen.  Denies other pain.  Sleeping ok.  Does note some moderate persistent nausea, which is increased with eating.  Appetite is fair.  He notes difficulty maintaining adequate hydration on thickened liquids but is trying his best.  Had episode of 5 bowel movements in 1 hour last night, despite having regular bowel movements prior to this.  Denies abdominal pain.  Some were loose.  Feels likely related to bowel medications.    With therapies, he is wheelchair based, completing slide board transfers with Ax1 with therapists or Ax2 with nursing.  He needs set-up for upper body dressing, max A for lower body dressing.    MEDICATIONS   acetaminophen  975 mg Oral TID    amLODIPine  10 mg Oral Daily    apixaban ANTICOAGULANT  5 mg Oral BID    atorvastatin  10 mg Oral QPM    melatonin  1 mg Oral QPM    metoprolol tartrate  100 mg Oral BID    pantoprazole  40 mg Oral QAM AC    psyllium  1 packet Oral Daily    QUEtiapine  100 mg Oral or Feeding Tube At Bedtime    sennosides  2 tablet Oral BID    Vitamin D3  50 mcg Oral Daily    wound support modular  60 mL Oral Daily        acetaminophen, calcium carbonate, diphenhydrAMINE-zinc acetate, docusate sodium, hydrOXYzine, naloxone **OR** naloxone **OR** naloxone **OR** naloxone, oxyCODONE, - MEDICATION INSTRUCTIONS -, polyethylene glycol, QUEtiapine     PHYSICAL EXAM  /65 (BP Location: Right arm)   Pulse 96   Temp 98.3  F (36.8  C) (Oral)   Resp 18   Ht 1.702 m (5' 7\")   Wt 64.4 kg (141 lb 15.6 oz)   " SpO2 98%   BMI 22.24 kg/m    Gen: NAD, lying in bed  HEENT: NC/AT, MMM  Cardio: RRR, no murmurs  Pulm: non-labored on room air, lungs CTA bilaterally  Abd: soft, non-tender, non-distended, bowel sounds present, incision with dressing CDI  Ext: no edema in RLE, LLE with sock in place, incision not visualized  Neuro/MSK: awake, alert, right facial droop, anisocoria (L>R), hypophonia/dysphonia; LLE HF 1/5; RLE HF 4/5, KE 4/5, DF 5/5, PF 5/5    LABS  CBC RESULTS:   Recent Labs   Lab Test 11/20/23  0617 11/17/23  0557 11/16/23  0704   WBC 7.1 7.3 8.6   RBC 2.55* 2.50* 2.56*   HGB 7.8* 7.7* 7.8*   HCT 24.4* 24.4* 24.4*   MCV 96 98 95   MCH 30.6 30.8 30.5   MCHC 32.0 31.6 32.0   RDW 15.7* 16.2* 16.6*   * 107* 105*       Last Basic Metabolic Panel:  Recent Labs   Lab Test 11/17/23  0557 11/16/23  0704 11/15/23  0548   * 130* 132*   POTASSIUM 4.8 5.2 5.1   CHLORIDE 100 99 100   CO2 20* 22 21*   ANIONGAP 10 9 11   * 114* 132*   BUN 58.2* 61.4* 56.5*   CR 1.97* 2.09* 1.93*   GFRESTIMATED 36* 33* 37*   OMAR 9.5 9.9 10.2       Rehabilitation - continue comprehensive acute inpatient rehabilitation program with multidisciplinary approach including therapies, rehab nursing, and physiatry following. See interval history for updates.      ASSESSMENT AND PLAN  Alfredo Burnham is a 68 year old  right hand dominant male with a past medical history of hypertension, TIA, 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, thrombocytopenia, and tobacco use disorder who was admitted on 9/24/23 with ruptured pararenal abdominal aortic aneurysm s/p open repair complicated by shock and colonic ischemic requiring multiple returns to OR for exploratory laparotomy, washout, and delayed closure with hospital course complicated by LLE ischemia ultimately 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, hematochezia with concern for possible bowel ischemia (no intramural ischemia on ex lap), MAYA requiring CRRT/HD (now off), acute hypoxic respiratory failure  requiring 2 separate intubations, PE/DVT, aspiration event, bilateral pleural effusions, acute blood loss anemia/thrombocytopenia (requiring multiple transfusions), multifocal embolic strokes, left psoas muscle and iliac hematomas, peripancreatic fluid collection and concern for possible pancreatitis, left vocal fold paralysis s/p injection 11/1, fevers, volume overload, delirium, tachycardia, exanthematous drug eruption/rash, malnutrition, dysphagia, hyperglycemia, loose stools, and multiple additional electrolyte abnormalities.  He is now admitted to ARU on 11/17/23 for multidisciplinary rehabilitation and ongoing medical management.        Admission to acute inpatient rehab 11/17/23.    Impairment group code: Amputation 05.3 Unilateral LE AKA - s/p left above knee amputation in setting of critical limb ischemia following contained AAA rupture s/p open AAA repair c/b strokes         PT, OT and SLP 60 minutes of each on a daily basis, in addition to rehab nursing and close management of physiatrist.       Impairment of ADL's: Noted to have  weakness, impaired balance,  baseline visual impairments (low vision), fatigue, impaired weight shifting, deconditioning, pain, new sternal and abdominal precautions, and LLE NWB status in setting of LLE AKA, all affecting his ability to safely and independently perform basic ADLs.  Goal for CGA or less with basic ADLs.     Impairment of mobility:  Noted to have  weakness, impaired balance,  baseline visual impairments (low vision), fatigue, impaired weight shifting, deconditioning, pain, new sternal and abdominal precautions, and LLE NWB status in setting of LLE AKA, all affecting his ability to safely and independently perform basic mobility.  Goal for CGA or less with basic transfers and likely mixed mobility vs wheelchair based.     Impairment of cognition/language/swallow:  Noted to have moderate to severe pharyngeal dysphagia and dysphonia with goals for safe tolerance of  least restrictive diet and improved cognitive-linguistic skills to meet basic needs.     Medical Conditions  New actions/orders/updates for today are in blue.     Acute critical LLE ischemia s/p L AKA on 10/17/23  Hematoma in LLE residual limb (10/25/23 CTA)  In setting of ruptured AAA as below  - Prevena vac in place at discharge to ARU.  However, vac was removed by vascular surgery on 11/18 due to concerns for bleeding/small hematoma and not conducive to hemostasis.  Assessed by vascular surgery today, Prevena wound vac was replaced.  Appreciate assistance.  Plan to leave intact for 7 days, to be managed by vascular surgery.  Contact vascular surgery if any questions, concerns, or new bleeding episodes.  Leave LLE AKA wrapped with ACE.    - Vascular surgery planning to remove every other staple on 11/30  - Continue Expedite daily for wound healing  - Vascular surgery will follow weekly at ARU  - [facility] LLE  - Pain management: Tylenol 975 mg TID scheduled + additional doses PRN, atarax 25 mg q6h PRN, oxycodone 5 mg q4h PRN, wean as able.  - Orthotics consult for new limb protector at ARU admission.  Orthotist adjusted fit of FloTech limb protector today due to decreased swelling.  Appreciate assistance.  - Vascular surgery will follow 1-2x/week at ARU  - Continue PT/OT  - Follow up with vascular surgery   - [facility], secondarily follow up with [facility] clinic here     Ruptured pararenal AAA s/p open repair 9/24/23 c/b shock, colonic ischemia  S/p multiple exploratory laparotomy and washout (9/25, 9/26, 9/29), fascia closure 10/2 with delayed primary skin closure on 10/20  - Wound care: Apply iodeine/betadine, allow to dry.  Reapply clean dry dressing.  OK to shower, do not scrub/soak.  Every other staple removed by vascular surgery today, remainder to be removed by vascular surgery on 11/30  - Activity restrictions: No lifting, pushing, or pulling greater than 10 pounds and no  "strenuous exercise for 4-6 weeks.   - Abdominal binder on at all times  - Statin as below  - Pain management as above  - Vascular surgery will follow 1-2x/week at ARU  - Follow up with vascular surgery     Acute ischemic stroke of multiple vessel territory due to embolic stroke of undetermined source (ESUS)   Code stroke 10/6 for new R facial droop and anisocoria.  CT head was without acute stroke or bleed, CTA was without proximal large vessel occlusion but did reveal concern for pulmonary embolism and had a normal perfusion scan. He was not a candidate for thrombolysis based on being outside 4.5 hours conventional time window, multiple major surgeries, thrombocytopenia and concern for GI bleed. He was not a candidate for thrombectomy considering absence of proximal large vessel occlusion.  MRI with above findings.  - BP management as below  - Apixaban as below  - Continue atorvastatin 10 mg daily  - Follow up with stroke neurology in 4-6 weeks     PE (incidental finding on CTA head/neck 10/6/23 for stroke code)  DVT LLE (U/S 10/7/23)  Initially on Heparin drip, required multiple brief holds due to bleeding concerns as below.  Ultimately transitioned to DOAC on 11/15.  - Continue apixaban 5 mg BID  - Per discharge summary, \"[facility] will need [facility] for [facility], otherwise his co-pays will be very expensive.\"   - Monitor respiratory status, continue supplemental oxygen PRN to maintain O2 sats >92%     Left iliac hematoma (10/13/23 CT)  Left psoas muscle hematoma (10/24/23 CTA)  Stable on repeat imaging.  Could contribute to weakness.  - Monitor Hgb as below and signs/symptoms of worsening bleeding     Anemia, multifactorial including acute blood loss, renal disease  Thrombocytopenia  Required multiple transfusions throughout course.  Hgb stable in 7-8s. Platelets 107 on 11/17.  11/20: Hgb stable at 7.8; platelets stable at 142  - Monitor for evidence of recurrent bleeding  - Continue to trend CBC " every M/Th     Tachycardia, suspect multifactorial including debility, pain, hydration status, PE  Hx hypertension  PTA on amlodipine 5 mg, benazepril 20 mg.  Held earlier this admission due to shock.  Restarted on amlodipine and added metoprolol due to persistent tachycardia.    - Continue amlodipine 10 mg daily  - Continue metoprolol tartrate 100 mg BID  - No ACE for now per vascular surgery  - Monitor BP/HR per unit routine     MAYA, improved  Renal emboli  Baseline Cr ~1.0.  CT with scattered areas of infarct related to emboli with AAA.  Cr rising after open AAA repair, peaked >5.  Started on CRRT 9/30, transitioned to HD on 10/5, last run on 10/27, HD line removed 11/2.  Cr most recently 1.9-2.1 range.  11/20: Cr stable at 1.96  - Avoid nephrotoxins as able  - Trend renal function every M/Th  - Follow up in CKD/post-MAYA clinic     Hyponatremia  Started around 11/9, has been in 132-134 range since then, but lower at 130 on 11/16-11/17.  11/20: Na stable at 133  - Trend BMP every M/Th  - Further work-up if persists     Left vocal fold paralysis/glottic insufficiency s/p laryngoscopy with injection laryngoplasty on 11/1  Dysphonia  Moderate-Severe Oropharyngeal Dysphagia   ENT consulted 10/28, fiberoptic endoscopy completed.  S/p injection 11/1.  - Continue SLP  - Continue soft and bite-sized (level 6) diet and moderately thick (level 3) liquids.  1:1 supervision for PO intake.  Ok for free water protocol per SLP.  - Per SLP, plan for repeat VFSS in ~2 weeks (~12/1)  - Follow up with ENT in 1 month (~12/1)     Delirium, multifactorial, improving  - Delirium precautions, sleep hygiene  - Decrease HS seroquel to 100 mg, add seroquel 25 mg BID PRN for agitation.  Wean as able at ARU.  - Continue melatonin 1 mg     Adjustment disorder with depressed mood   Seen by psychology during acute hospital stay.  Not on medications.  - Monitor mood, consult to spiritual services or health psychology if indicated     Severe  malnutrition in the context of acute illness/disease   Peripancreatic fluid collection on CT 10/13, concern possible pancreatitis, improved on repeat imaging  Previously on tube feeding, tube removed 11/13 per patient/family request.  Juvencio counts with a lot of missing information, but reportedly improving intake.  Recent fecal elastase normalized, no ongoing need for Creon (concerns for possibly pancreatitis earlier this admission).   - Continue soft and bite-sized (level 6) diet and moderately thick (level 3) liquids  - RD consulted, appreciate assistance  - Supplements per RD recs     Hx bilateral legal 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  - Noted     Adjustment to disability:  Clinical psychology to eval and treat if indicated  FEN: soft and bite-sized (level 6) diet and moderately thick (level 3) liquids.  1:1 supervision for PO intake.  Bowel: continent.  Had BM x5 overnight.  Decrease sennosides to 1 tab BID (from 2 tabs BID), continue psyllium daily.  Monitor and adjust regimen as indicated.  Bladder: continent, PVRs at admission with mild elevation in 100-200 mL range.  Encourage double voiding, optimize positioning.  Continue to monitor until x3 consecutive <100 mL.  DVT Prophylaxis: on apixaban as above  GI Prophylaxis: PPI  Code: full code  Disposition: goal for home  ELOS:  target 12/6/23  Follow up Appointments on Discharge: PCP in 1-2 weeks, vascular surgery, stroke neurology in 4-6 weeks, ENT (~12/1), nephrology, PM&R ([facility] or [facility])      I, Zofia Bray, spent a total of 40 minutes face-to-face or managing the care of Alfredo Burnham. Over 50% of my time on the unit was spent counseling the patient and coordinating care. See note for details.       Zofia Bray PA-C  Physical Medicine & Rehabilitation

## 2023-11-20 NOTE — PLAN OF CARE
Goal Outcome Evaluation:      Plan of Care Reviewed With: patient    Overall Patient Progress: no changeOverall Patient Progress: no change         Patient is alert and oriented, able to make needs known  Slept fairly overnight, had BM x5 , mix continent of Bowel, utilized bedpan, BSC and 3 incontinent of BM . No complained of pain, no respiratory distress. Safety checks done. Plan of care ongoing.

## 2023-11-20 NOTE — PLAN OF CARE
Discharge Planner Post-Acute Rehab PT:      Discharge Plan: Home with spouse (accessible apt). Home care PT.     Precautions: L AKA: NWB, abdominal     Current Status:  Bed Mobility: SBA sit>supine, min A through trunk supine>sit. Cues for logroll technique  Transfer: SBA slide board transfer with assist for placement/removal and WC set-up.  Gait: N/T  Stairs: NA  Balance: Fair dynamic sitting. Requires CGA for quasistatic standing in // bars     Assessment: Continues to be limited by decreased CV endurance, requiring repeated and extended rest breaks t/o session, but agreeable and motivated. Demonstrating improving IND with bed mobility and transfers per above compared to several days ago, including ability for STS/quasistatic standing up to ~45 sec intervals in // bars with CGA. Provided wife with home measurements sheet to complete.     Other Barriers to Discharge (DME, Family Training, etc):   DME - wheelchair, transfer board, potential walker for transfers  Family training to be scheduled.  AKA HEP provided 11/20.  Home measurements sheet provided 11/20.

## 2023-11-20 NOTE — PROGRESS NOTES
Individualized Overall Plan Of Care (IOPOC)      Rehab diagnosis/Impairment Group Code: Amputation 05.3 unilateral le aka - s/p left above knee amputation in setting of critical limb ischemia following contained aaa rupture s/p open aaa repair c/b strokes  S/p above knee amputation, left (h)       Expected functional outcome: reach a level of mod I     Clinical Impression Comments:  S/P L AKA with decreased functional status    Mobility: 68 year old male admitted s/p L AKA, multiple abdominal surgeries. Cognitive deficts noted with functional mobility that may impact LOS. ELOS is 2.5 weeks with goals for wheelchair mobility at household level. Predicted  OP vs HH PT.    ADL: Pt is significantly below baseline with complicated hospital course and many precautions. Pt will benefit from skilled OT.    Communication/Cognition/Swallow: SLP: Pt seen on ARU for cognitive/linguistic assessment. This date, assessment was informal, as pt reported not being able to do visual tasks. Later in the day, pts wife was present and reports that pt is able to do modified visual tasks with use of magnifying glass. Pt will benefit from further structured assessment tasks to obtain more complete information on pts cognitive function. Pt demonstrates adequate expressive/receptive language, vocal quality is severely affected. Pt has had an injection to vocal fold but reports no improvement. Non visual portion of CLQT involving recall of short story is minimally impaired. Functional math task/mental manipulation with moderate impairment in thought organization. Pt is currenlty below baseline level of function and will benefit from skilled SLP intervention to further analyze pt's cogntiive/linguistic function and address areas of impairment.     Intensity of therapy:   PT 60 minutes, Daily, for 16 days   OT 60 minutes, Daily, for 16 days   SLP 60 minutes, daily, for 16 days       Education wound care  Neuropsychology Testing:  No        Medical Prognosis: good       Physician summary statement: S/P L AKA with decreased functional status, goal is to reach a level of mod I     Discharge destination: prior home  Discharge rehabilitation needs: home care, RN, PT, OT and SLP      Estimated length of stay: 16 days       Rehabilitation Physician Gilberto Merritt DO

## 2023-11-20 NOTE — PROGRESS NOTES
S: Pt seen bedside with reports that he has only used his LT AK Limb protector one time. O: I see the LT AK Pete Tech limb protector and since his swelling has gone down it needs adjustment. I see the EPIC order that he has lost volume in his residual limb and that the Pete Tech is ill fitting and needs adjustment. A: I added the second pad from his maroon and gold bag into the distal end of the protector and made the protector smaller and trimmed the straps for a better look. I again showed him how to put it on by just slipping it on and using the Verco waist belt but not to take it apart, just do and undo the waist belt is all that needs to happen to don and doff the Pete Tech AK protector. He seemed to understand and had no further questions regarding prosthetics at this time. P: REX PRN. John Paul Bae , LPO.

## 2023-11-21 ENCOUNTER — APPOINTMENT (OUTPATIENT)
Dept: OCCUPATIONAL THERAPY | Facility: CLINIC | Age: 68
DRG: 559 | End: 2023-11-21
Attending: PHYSICAL MEDICINE & REHABILITATION
Payer: COMMERCIAL

## 2023-11-21 ENCOUNTER — APPOINTMENT (OUTPATIENT)
Dept: SPEECH THERAPY | Facility: CLINIC | Age: 68
DRG: 559 | End: 2023-11-21
Attending: PHYSICAL MEDICINE & REHABILITATION
Payer: COMMERCIAL

## 2023-11-21 ENCOUNTER — APPOINTMENT (OUTPATIENT)
Dept: PHYSICAL THERAPY | Facility: CLINIC | Age: 68
DRG: 559 | End: 2023-11-21
Attending: PHYSICAL MEDICINE & REHABILITATION
Payer: COMMERCIAL

## 2023-11-21 LAB — GLUCOSE BLDC GLUCOMTR-MCNC: 104 MG/DL (ref 70–99)

## 2023-11-21 PROCEDURE — 250N000013 HC RX MED GY IP 250 OP 250 PS 637: Performed by: PHYSICIAN ASSISTANT

## 2023-11-21 PROCEDURE — 99232 SBSQ HOSP IP/OBS MODERATE 35: CPT | Mod: FS | Performed by: PHYSICIAN ASSISTANT

## 2023-11-21 PROCEDURE — 999N000150 HC STATISTIC PT MED CONFERENCE < 30 MIN

## 2023-11-21 PROCEDURE — 97530 THERAPEUTIC ACTIVITIES: CPT | Mod: GP

## 2023-11-21 PROCEDURE — 92526 ORAL FUNCTION THERAPY: CPT | Mod: GN

## 2023-11-21 PROCEDURE — 128N000003 HC R&B REHAB

## 2023-11-21 PROCEDURE — 999N000125 HC STATISTIC PATIENT MED CONFERENCE < 30 MIN

## 2023-11-21 PROCEDURE — 999N000125 HC STATISTIC PATIENT MED CONFERENCE < 30 MIN: Performed by: STUDENT IN AN ORGANIZED HEALTH CARE EDUCATION/TRAINING PROGRAM

## 2023-11-21 PROCEDURE — 97535 SELF CARE MNGMENT TRAINING: CPT | Mod: GO | Performed by: STUDENT IN AN ORGANIZED HEALTH CARE EDUCATION/TRAINING PROGRAM

## 2023-11-21 RX ADMIN — ACETAMINOPHEN 975 MG: 325 TABLET, FILM COATED ORAL at 07:58

## 2023-11-21 RX ADMIN — Medication 50 MCG: at 07:59

## 2023-11-21 RX ADMIN — ACETAMINOPHEN 975 MG: 325 TABLET, FILM COATED ORAL at 20:23

## 2023-11-21 RX ADMIN — ATORVASTATIN CALCIUM 10 MG: 10 TABLET, FILM COATED ORAL at 20:23

## 2023-11-21 RX ADMIN — PSYLLIUM HUSK 1 PACKET: 3.4 POWDER ORAL at 07:58

## 2023-11-21 RX ADMIN — AMLODIPINE BESYLATE 10 MG: 10 TABLET ORAL at 07:59

## 2023-11-21 RX ADMIN — QUETIAPINE FUMARATE 100 MG: 25 TABLET ORAL at 20:24

## 2023-11-21 RX ADMIN — APIXABAN 5 MG: 5 TABLET, FILM COATED ORAL at 08:00

## 2023-11-21 RX ADMIN — QUETIAPINE FUMARATE 25 MG: 25 TABLET ORAL at 15:33

## 2023-11-21 RX ADMIN — Medication 1 MG: at 20:24

## 2023-11-21 RX ADMIN — PANTOPRAZOLE SODIUM 40 MG: 40 TABLET, DELAYED RELEASE ORAL at 05:58

## 2023-11-21 RX ADMIN — ACETAMINOPHEN 975 MG: 325 TABLET, FILM COATED ORAL at 15:28

## 2023-11-21 RX ADMIN — METOPROLOL TARTRATE 100 MG: 50 TABLET, FILM COATED ORAL at 20:23

## 2023-11-21 RX ADMIN — APIXABAN 5 MG: 5 TABLET, FILM COATED ORAL at 20:24

## 2023-11-21 RX ADMIN — METOPROLOL TARTRATE 100 MG: 50 TABLET, FILM COATED ORAL at 08:00

## 2023-11-21 ASSESSMENT — ACTIVITIES OF DAILY LIVING (ADL)
ADLS_ACUITY_SCORE: 41
ADLS_ACUITY_SCORE: 43
ADLS_ACUITY_SCORE: 43
ADLS_ACUITY_SCORE: 41
ADLS_ACUITY_SCORE: 43
ADLS_ACUITY_SCORE: 41
ADLS_ACUITY_SCORE: 43
ADLS_ACUITY_SCORE: 43

## 2023-11-21 NOTE — PLAN OF CARE
Goal Outcome Evaluation:      Plan of Care Reviewed With: patient    Overall Patient Progress: no changeOverall Patient Progress: no change    Outcome Evaluation: AXOx4; whispery voice. Complained of pain on left AKA. PRN oxycodone given. Wound vac in place and patent. No drainage noted. Abdominal binder on at all times. L AKA covered with own stockinet. Transfers with SS. NWB on left limb. Safety checks done. Asleep most of the night.

## 2023-11-21 NOTE — PROGRESS NOTES
"  [facility]   Acute Rehabilitation Unit  Daily progress note    INTERVAL HISTORY  Alfredo Burnham was seen and examined at bedside this morning during team rounds, spouse joining by phone.  No acute events reported overnight.  Pain overall seems well-controlled.  Continent of bowel and bladder.      With therapies, he is making gradual progress.  Currently mostly SBA for slide board transfers.  [facility].  Still limited by endurance.  SLP noting progress with solids, hopeful to advance diet.  Suspect mild to moderate cognitive deficits, but will require further evaluation.  For full functional updates, see team rounds note from today.    MEDICATIONS   acetaminophen  975 mg Oral TID    amLODIPine  10 mg Oral Daily    apixaban ANTICOAGULANT  5 mg Oral BID    atorvastatin  10 mg Oral QPM    melatonin  1 mg Oral QPM    metoprolol tartrate  100 mg Oral BID    pantoprazole  40 mg Oral QAM AC    psyllium  1 packet Oral Daily    QUEtiapine  100 mg Oral or Feeding Tube At Bedtime    sennosides  1 tablet Oral BID    Vitamin D3  50 mcg Oral Daily    wound support modular  60 mL Oral Daily        acetaminophen, calcium carbonate, diphenhydrAMINE-zinc acetate, docusate sodium, hydrOXYzine, naloxone **OR** naloxone **OR** naloxone **OR** naloxone, oxyCODONE, - MEDICATION INSTRUCTIONS -, polyethylene glycol, QUEtiapine     PHYSICAL EXAM  /81 (BP Location: Right arm, Patient Position: Semi-Carpenter's, Cuff Size: Adult Regular)   Pulse 99   Temp 98.3  F (36.8  C) (Oral)   Resp 18   Ht 1.702 m (5' 7\")   Wt 64.4 kg (141 lb 15.6 oz)   SpO2 95%   BMI 22.24 kg/m    Gen: NAD, up in chair  HEENT: NC/AT, MMM  Cardio: appears well-perfused  Pulm: non-labored on room air  Abd: soft, non-tender, non-distended  Ext: no edema in RLE, LLE with sock in place, incision not visualized but small bloody drainage on sock  Neuro/MSK: awake, alert, right facial droop, " anisocoria (L>R), hypophonia/dysphonia  *Full exam deferred today for conversation    LABS  CBC RESULTS:   Recent Labs   Lab Test 11/20/23  0617 11/17/23  0557 11/16/23  0704   WBC 7.1 7.3 8.6   RBC 2.55* 2.50* 2.56*   HGB 7.8* 7.7* 7.8*   HCT 24.4* 24.4* 24.4*   MCV 96 98 95   MCH 30.6 30.8 30.5   MCHC 32.0 31.6 32.0   RDW 15.7* 16.2* 16.6*   * 107* 105*       Last Basic Metabolic Panel:  Recent Labs   Lab Test 11/20/23  0617 11/17/23  0557 11/16/23  0704   * 130* 130*   POTASSIUM 4.6 4.8 5.2   CHLORIDE 100 100 99   CO2 25 20* 22   ANIONGAP 8 10 9   * 118* 114*   BUN 58.7* 58.2* 61.4*   CR 1.96* 1.97* 2.09*   GFRESTIMATED 36* 36* 33*   OMAR 9.4 9.5 9.9       Rehabilitation - continue comprehensive acute inpatient rehabilitation program with multidisciplinary approach including therapies, rehab nursing, and physiatry following. See interval history for updates.      ASSESSMENT AND PLAN  Alfredo Burnham is a 68 year old  right hand dominant male with a past medical history of hypertension, TIA, 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, thrombocytopenia, and tobacco use disorder who was admitted on 9/24/23 with ruptured pararenal abdominal aortic aneurysm s/p open repair complicated by shock and colonic ischemic requiring multiple returns to OR for exploratory laparotomy, washout, and delayed closure with hospital course complicated by LLE ischemia ultimately 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, hematochezia with concern for possible bowel ischemia (no intramural ischemia on ex lap), MAYA requiring CRRT/HD (now off), acute hypoxic respiratory failure requiring 2 separate intubations, PE/DVT, aspiration event, bilateral pleural effusions, acute blood loss anemia/thrombocytopenia (requiring multiple transfusions), multifocal embolic strokes, left psoas muscle and iliac hematomas, peripancreatic fluid collection and concern for possible pancreatitis, left vocal fold paralysis s/p injection 11/1, fevers, volume  overload, delirium, tachycardia, exanthematous drug eruption/rash, malnutrition, dysphagia, hyperglycemia, loose stools, and multiple additional electrolyte abnormalities.  He is now admitted to ARU on 11/17/23 for multidisciplinary rehabilitation and ongoing medical management.        Admission to acute inpatient rehab 11/17/23.    Impairment group code: Amputation 05.3 Unilateral LE AKA - s/p left above knee amputation in setting of critical limb ischemia following contained AAA rupture s/p open AAA repair c/b strokes         PT, OT and SLP 60 minutes of each on a daily basis, in addition to rehab nursing and close management of physiatrist.       Impairment of ADL's: Noted to have  weakness, impaired balance,  baseline visual impairments (low vision), fatigue, impaired weight shifting, deconditioning, pain, new sternal and abdominal precautions, and LLE NWB status in setting of LLE AKA, all affecting his ability to safely and independently perform basic ADLs.  Goal for CGA or less with basic ADLs.     Impairment of mobility:  Noted to have  weakness, impaired balance,  baseline visual impairments (low vision), fatigue, impaired weight shifting, deconditioning, pain, new sternal and abdominal precautions, and LLE NWB status in setting of LLE AKA, all affecting his ability to safely and independently perform basic mobility.  Goal for CGA or less with basic transfers and likely mixed mobility vs wheelchair based.     Impairment of cognition/language/swallow:  Noted to have moderate to severe pharyngeal dysphagia and dysphonia with goals for safe tolerance of least restrictive diet and improved cognitive-linguistic skills to meet basic needs.     Medical Conditions  New actions/orders/updates for today are in blue.     Acute critical LLE ischemia s/p L AKA on 10/17/23  Hematoma in LLE residual limb (10/25/23 CTA)  In setting of ruptured AAA as below  - Prevena vac in place at discharge to ARU.  However, vac was  removed by vascular surgery on 11/18 due to concerns for bleeding/small hematoma and not conducive to hemostasis.  Assessed by vascular surgery 11/20, Prevena wound vac was replaced.  Appreciate assistance.  Plan to leave intact for 7 days, to be managed by vascular surgery.  Contact vascular surgery if any questions, concerns, or new bleeding episodes.  Leave LLE AKA wrapped with ACE.    - Vascular surgery planning to remove every other staple on 11/30  - Continue Expedite daily for wound healing  - Vascular surgery will follow weekly at [facility]  - [facility] LLE  - Pain management: Tylenol 975 mg TID scheduled + additional doses PRN, atarax 25 mg q6h PRN, oxycodone 5 mg q4h PRN, wean as able.  - Orthotist adjusted fit of FloTech limb protector 11/20 due to decreased swelling.  Appreciate assistance.  - Vascular surgery will follow 1-2x/week at ARU  - Continue PT/OT  - Follow up with vascular surgery   - Look into [facility], secondarily follow up with amputee clinic here     Ruptured pararenal AAA s/p open repair 9/24/23 c/b shock, colonic ischemia  S/p multiple exploratory laparotomy and washout (9/25, 9/26, 9/29), fascia closure 10/2 with delayed primary skin closure on 10/20  - Wound care: Apply iodeine/betadine, allow to dry.  Reapply clean dry dressing.  OK to shower, do not scrub/soak.  Every other staple removed by vascular surgery 11/16, remainder to be removed by vascular surgery on 11/30  - Activity restrictions: No lifting, pushing, or pulling greater than 10 pounds and no strenuous exercise for 4-6 weeks.   - Abdominal binder on at all times  - Statin as below  - Pain management as above  - Vascular surgery will follow 1-2x/week at ARU  - Follow up with vascular surgery     Acute ischemic stroke of multiple vessel territory due to embolic stroke of undetermined source (ESUS)   Code stroke 10/6 for new R facial droop and anisocoria.  CT head was without acute stroke or bleed, CTA was  "without proximal large vessel occlusion but did reveal concern for pulmonary embolism and had a normal perfusion scan. He was not a candidate for thrombolysis based on being outside 4.5 hours conventional time window, multiple major surgeries, thrombocytopenia and concern for GI bleed. He was not a candidate for thrombectomy considering absence of proximal large vessel occlusion.  MRI with above findings.  - BP management as below  - Apixaban as below  - Continue atorvastatin 10 mg daily  - Follow up with stroke neurology in 4-6 weeks     PE (incidental finding on CTA head/neck 10/6/23 for stroke code)  DVT LLE (U/S 10/7/23)  Initially on Heparin drip, required multiple brief holds due to bleeding concerns as below.  Ultimately transitioned to DOAC on 11/15.  - Continue apixaban 5 mg BID  - Per discharge summary, \"he will need [facility] management for cost effective co-pays, otherwise his co-pays will be very expensive.\"   - Monitor respiratory status, continue supplemental oxygen PRN to maintain O2 sats >92%     Left iliac hematoma (10/13/23 CT)  Left psoas muscle hematoma (10/24/23 CTA)  Stable on repeat imaging.  Could contribute to weakness.  - Monitor Hgb as below and signs/symptoms of worsening bleeding     Anemia, multifactorial including acute blood loss, renal disease  Thrombocytopenia  Required multiple transfusions throughout course.  Hgb stable in 7-8s. Platelets 107 on 11/17.  11/20: Hgb stable at 7.8; platelets stable at 142  - Monitor for evidence of recurrent bleeding  - Continue to trend CBC every M/Th     Tachycardia, suspect multifactorial including debility, pain, hydration status, PE  Hx hypertension  PTA on amlodipine 5 mg, benazepril 20 mg.  Held earlier this admission due to shock.  Restarted on amlodipine and added metoprolol due to persistent tachycardia.    - Continue amlodipine 10 mg daily  - Continue metoprolol tartrate 100 mg BID  - No ACE for now per vascular surgery  - Monitor BP/HR per unit " routine     MAYA, improved  Renal emboli  Baseline Cr ~1.0.  CT with scattered areas of infarct related to emboli with AAA.  Cr rising after open AAA repair, peaked >5.  Started on CRRT 9/30, transitioned to HD on 10/5, last run on 10/27, HD line removed 11/2.  Cr most recently 1.9-2.1 range.  11/20: Cr stable at 1.96  - Avoid nephrotoxins as able  - Trend renal function every M/Th  - Follow up in CKD/post-MAYA clinic     Hyponatremia  Started around 11/9, has been in 132-134 range since then, but lower at 130 on 11/16-11/17.  11/20: Na stable at 133  - Trend BMP every M/Th  - Further work-up if persists     Left vocal fold paralysis/glottic insufficiency s/p laryngoscopy with injection laryngoplasty on 11/1  Dysphonia  Moderate-Severe Oropharyngeal Dysphagia   ENT consulted 10/28, fiberoptic endoscopy completed.  S/p injection 11/1.  - Continue SLP  - Per SLP, advance to  easy-to-chew (level 7) diet; continue moderately thick (level 3) liquids. Ok for free water protocol per SLP.  Per SLP, benefits from set-up assist and intermittent supervision during meals, needs to be fully upright.  Ok to leave thickened liquids within patient's reach if upright in order to promote improved hydration.  - Per SLP, plan for repeat VFSS in ~2 weeks (~12/1)  - Follow up with ENT in 1 month (~12/1)     Delirium, multifactorial, improving  - Delirium precautions, sleep hygiene  - Continue HS seroquel 100 mg, seroquel 25 mg BID PRN for agitation.  Wean as able at ARU.  - Continue melatonin 1 mg     Adjustment disorder with depressed mood   Seen by psychology during acute hospital stay.  Not on medications.  - Team noting concerns for depressed mood.  Psychology consult placed, appreciate assistance  - Continue to monitor     Severe malnutrition in the context of acute illness/disease   Peripancreatic fluid collection on CT 10/13, concern possible pancreatitis, improved on repeat imaging  Previously on tube feeding, tube removed 11/13  per patient/family request.  Juvencio counts with a lot of missing information, but reportedly improving intake.  Recent fecal elastase normalized, no ongoing need for Creon (concerns for possibly pancreatitis earlier this admission).   - Per SLP, advance to  easy-to-chew (level 7) diet; continue moderately thick (level 3) liquid  - RD consulted, appreciate assistance  - Supplements per RD recs     Hx bilateral legal 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  - Noted       Adjustment to disability:  Clinical psychology to eval and treat if indicated  FEN: per SLP, advance to easy-to-chew (level 7) diet; continue moderately thick (level 3) liquids.  1:1 supervision for PO intake.  Bowel: continent.  On sennosides 1 tab BID (decreased 11/20), psyllium daily.  Monitor and adjust regimen as indicated.  Bladder: continent, PVRs at admission but since improved.  Encourage double voiding, optimize positioning.    DVT Prophylaxis: on apixaban as above  GI Prophylaxis: PPI  Code: full code  Disposition: goal for home  ELOS:  target 12/6/23  Follow up Appointments on Discharge: PCP in 1-2 weeks, vascular surgery, stroke neurology in 4-6 weeks, ENT (~12/1), nephrology, PM&R ([facility] or [facility])      I, Zofia Bray, spent a total of 40 minutes face-to-face or managing the care of Alfredo Burnham. Over 50% of my time on the unit was spent counseling the patient and coordinating care. See note for details.       Patient was seen and discussed with Dr. Gilberto Merritt, PM&R staff physician     Zofia Bray PA-C  Physical Medicine & Rehabilitation

## 2023-11-21 NOTE — PLAN OF CARE
"Rehabilitation Medicine Commode Face to Face Note     Diagnosis: left side above knee amputation  Current height:  5'7\"  Current weight: 141 lb  :53     Current transfer status: MIN A with Slide board transfers  Current Toileting status: MIN A with slide board transfer to commode  Current Mobility Status: Wheel chair based  Therapy team has ruled out the following less costly options:              Bedpan -  pt is not bed ridden   Raised toilet seat - unsafe to use a slide board with a raised toilet seat attachment              Toilet with grab bar - pt is using a slide board for his transfers which is not safe to use with a slide board    Pt's home will accommodate the pt using a wheel chair to get into the bathroom however he will require a drop arm platform commode to be able to slide board to the commode that could either be placed over a toilet or stand alone.     Patient will use a drop arm platform commode for toileting daily. Patient has been using this type of commode during acute rehab stay with success.     This equipment will allow them to be as IND as possible with toileting and reduce caregiver burden, it will be part of a fall prevention plan for safe mobility.      Patient has a family member willing and able to assist as necessary with toileting or patient is IND with toileting routine.      Arm and leg strength: UE strength and trunk control adequate to sit in a bedside commode.     Patient requires a drop arm platform commode to accommodate a slide board for safest transfer.       Length of need:        Gilberto Merritt DO  NPI:  [id number]     Signature:  Date:                            "

## 2023-11-21 NOTE — PLAN OF CARE
Goal Outcome Evaluation:    FOCUS/GOAL  Bowel management, Bladder management, Medication management, Pain management, Wound care management, Discharge planning, Mobility, Skin integrity, Equipment/Assistive devices, Energy conservation, Caregiver training, Carryover of rehab techniques, Safety management, Prevention of secondary complications, and Adjustment to lifestyle change    ASSESSMENT, INTERVENTIONS AND CONTINUING PLAN FOR GOAL:    Patient is alert and oriented x 4. Able to make needs known. Pt denied SOB, N/V and chest pain. Pt is L AKA with portable wound vac that is currently functioning properly. L AKA dressing was changed today by the wound nurse. Abdominal wound care and dressing changed. Pt uses sliding board for transfer. Continent of bowel and bladder. Takes med whole with thickened liquid. Call light within reach. Pt's wife visited today.      Patient's most recent vital signs are:     Vital signs:  BP: 129/43  Temp: 98.3  HR: 107  RR: 18  SpO2: 96 %     Patient does not have new respiratory symptoms.  Patient does not have new sore throat.  Patient does not have a fever greater than 99.5.

## 2023-11-21 NOTE — PLAN OF CARE
"Rehabilitation Medicine Wheelchair Face to Face     Diagnosis: s/p left above knee amputation   Currently has limited mobility due to pain, BLE and BUE weakness, left AKA, abdominal precautions, poor activity tolerance.     Current transfer status: Slideboard transfer w/ SBA and setup assist  Distance patient currently able to walk: Pt is non-ambulatory d/t L AKA  Therapy team has ruled out the following less costly options:   Cane due to pt needs BUE support for standing.   Walker due to pt does on abdominal precautions w/ lifting restriction <10lb and pt does not have sufficient UE strength to perform pivot transfer w/ FWW.   Patient will use wheelchair to complete Mobility Related ADL's in the home including toileting, dressing, self cares, meal prep.  Without a wheelchair patient will be unable to safely move about their home. This equipment will allow them to be out of bed, participate in home activities with their family, and it will be part of a fall prevention plan for safe mobility.   Patient's home will accommodate use of wheelchair.  Patient has a family member willing and able to assist as necessary with wheelchair.   Patient has not expressed that they are unwilling to use the wheel chair..     Arm and leg strength: BUEs grossly 4/5. BLEs grossly 4/5    Gait/balance/coordination: Needs SBA for unsupported dynamic sitting tasks. Requires BUE support in standing.     Length of need: Lifetime    Current height: 5'7\"  Current weight: 141 lb  : 53    Gilberto Merritt DO  NPI# [id number]    Signature:  Date:        "

## 2023-11-21 NOTE — PROGRESS NOTES
CLINICAL NUTRITION SERVICES - REASSESSMENT NOTE     Nutrition Prescription    RECOMMENDATIONS FOR MDs/PROVIDERS TO ORDER:  Appreciate encouragement surrounding PO intake    Malnutrition Status:    Severe malnutrition in the context of acute illness.     Recommendations already ordered by Registered Dietitian (RD):  - Expedite Cup with breakfast  - Magic Cup (chocolate) with breakfast and dinner  - Gelatein (pineapple) with lunch  - Additional snacks/supplements PRN    Future/Additional Recommendations:  Monitor PO intake, supplement use, labs, and weight trends     EVALUATION OF THE PROGRESS TOWARD GOALS   Diet: Level 7: Easy to Chew Dysphagia Diet  and Moderately Thick  Thickened Liquids   - Room service with assist    Snacks/supplements:   - Expedite cup with breakfast  - Magic Cup at breakfast and dinner  - Magic Cup at 2 pm and HS  - Additional snacks/supplements PRN    Intake: 0-75% per flowsheets since admission       NEW FINDINGS   Met with pt at bedside. He has poor appetite. He typically eats best at his lunch meal (~50-75%). He likes the chocolate Magic Cup and pineapple Gelatein. He has also been using the Expedite cup. Discussed importance of adequate protein for wound healing.     Weight:  Pt with limited weight history prior to admission,with 34 lb (19%) weight loss over 1 month since  AKA on 10/17. Expect ~ 10% weight loss. If 10-11% loss, pt with 8-9% weight loss in 1 month.   Wt Readings from Last Encounters:   11/17/23 64.4 kg (141 lb 15.6 oz)   11/17/23 64.6 kg (142 lb 6.7 oz)   11/17/23 0854             64.6 kg (142 lb 6.7 oz)  11/13/23 0248             67.7 kg (149 lb 4 oz)  11/12/23 0500             68.8 kg (151 lb 10.8 oz)  11/10/23 0645             70.1 kg (154 lb 8.7 oz)  11/09/23 0207             70.4 kg (155 lb 3.3 oz)  11/08/23 0035             69 kg (152 lb 1.9 oz)  11/05/23 0628             68.4 kg (150 lb 12.7 oz)  11/04/23 0335             68.5 kg (151 lb 0.2 oz)  11/03/23 0628              70.2 kg (154 lb 12.2 oz)  10/30/23 0400             71.8 kg (158 lb 4.6 oz)  10/26/23 0000             72.9 kg (160 lb 11.5 oz)  10/23/23 0000             74.4 kg (164 lb 0.4 oz)  10/21/23 0000             76.1 kg (167 lb 12.3 oz)  10/20/23 0400             81 kg (178 lb 9.2 oz)  10/17/23 0025             79.8 kg (175 lb 14.8 oz) - AKA  10/16/23 0400             78.9 kg (173 lb 15.1 oz)  10/13/23 0200             76.7 kg (169 lb 1.5 oz)  10/12/23 0000             77.2 kg (170 lb 3.1 oz)  10/09/23 0500             79.8 kg (175 lb 14.8 oz)  10/04/23 0400             89.1 kg (196 lb 6.9 oz)  09/28/23 0000             95.4 kg (210 lb 5.1 oz)  09/26/23 2200             87.5 kg (192 lb 14.4 oz)  09/25/23 2000             83.2 kg (183 lb 6.8 oz)  09/25/23 0350             79.4 kg (175 lb 0.7 oz)  01/26/23 1300             73.4 kg (161 lb 11.3 oz)       Labs:   Na: 133 (L)  BUN: 58.7 (H)  Cr: 1.96 (H)  Phos: 4.7 (H)  Hemoglobin A1C: 5.7 (9/25)  Vitamin D: 18 (L, 11/7)    Meds:  Protonix  Psyllium  Senokot  Vitamin D3  Expedite cup    GI: last BM 11/20 per I/Os    Skin: Edson 12  L AKA    MALNUTRITION  % Intake: < 75% for > 7 days (moderate)  % Weight Loss: > 5% in 1 month (severe)  Subcutaneous Fat Loss: Facial region: moderate and Upper arm: moderate  Muscle Loss: Temporal: moderate, Thoracic region (clavicle, acromium bone, deltoid, trapezius, pectoral): moderate, and Upper arm (bicep, tricep): moderate  Fluid Accumulation/Edema: None noted  Malnutrition Diagnosis: Severe malnutrition in the context of acute illness.     Previous Goals   Patient to consume % of nutritionally adequate meal trays TID, or the equivalent with supplements/snacks.   Evaluation: Not met    Previous Nutrition Diagnosis  Inadequate oral intake related to poor appetite and restrictive modified diet as evidenced by documented intakes, pt report.     Evaluation: No change    CURRENT NUTRITION DIAGNOSIS  Inadequate oral intake related to  poor appetite and restrictive modified diet as evidenced by documented intakes, pt report.       INTERVENTIONS  Implementation  Medical food supplement therapy - Magic Cup, Gelatein, Expedite cup    Goals  Patient to consume % of nutritionally adequate meal trays TID, or the equivalent with supplements/snacks.     Monitoring/Evaluation  Progress toward goals will be monitored and evaluated per protocol.     Michelle Sandhu RD, MICHAEL  ARU RD pager: [phone]  Weekend/Holiday RD pager: [phone]

## 2023-11-21 NOTE — PLAN OF CARE
Discharge Planner Post-Acute Rehab SLP:     Discharge Plan: home with assist,  SLP. ENT for further VF follow up     Precautions: fall, aspiration, sternal     Current Status:  Hearing: WFL  Vision: impaired; uses magnifying glass to read large print  Communication: aphonic. VF injection completed by ENT   Cognition: Mild cognitive impairment re: attention, memory, executive functions. Informal assessment complete and pt limited by vision. Noting impaired cognition across other disiplines     Swallow: Easy to Chew (7), moderately thick (3) liquid. Set up assist meals, Fully upright all PO, small single sips/bites. Ok for ice chips via free water protocol (FWP)    Assessment: Pt seen with meal trial easy nate w(7) texture, moderately thic k(3) liquid by cup and straw. Pt prolonged but functional mastication, delayed aP transit, no oral residuals. Mild increase in fatigue with onset of more advanced texture but did not limit PO volume acceptance. pt also with improved acceptance of solids within trialed advanced texture. No s/sx aspiration. Recommend advance to 7, continue 3 liquid. modify to set up assist meals only, ensure fully upright positioning. straws ok     Other Barriers to Discharge (Family Training, etc): family training: diet pending progress, IADL support

## 2023-11-21 NOTE — PLAN OF CARE
Discharge Planner Post-Acute Rehab OT:     Discharge Plan: home with assist as needed and HH OT    Precautions: fall, sternal, abdominal, NWB of LLE, wound vac on LLE, moderate thick liquids    Current Status:  ADLs:  Mobility: Wc based, slide board Ax1, Ax2 with slide board for nursing  Grooming: set up seated  Dressing: UB: set up, LB min A in supine  Bathing: Ax2 using slideboard to rolling blue and white shower chair, min A for bathing seated  Toileting: slide board bed to commode or Wc to commode over the toilet with Ax1 in therapy, Ax2 with nursing  IADLs: Wife can assist  Vision/Cognition: baseline visual deficits as pt is legally blind, need to further assess cognition.    Assessment: Trialing various strategies for LB dressing, pt currently does better with dressing in supine. SB transfers are improving.    Other Barriers to Discharge (DME, Family Training, etc): Family training, DME

## 2023-11-21 NOTE — PLAN OF CARE
Goal Outcome Evaluation:  Pt is alert and oriented x 4, he denies pain or discomfort. He is on scheduled Tylenol. He used the call light appropriately and as needed for assistance. He transfers with assist of one person using a sliding board. For more details, see rounds and care conference note from this morning.

## 2023-11-21 NOTE — PROGRESS NOTES
Acute Rehab Care Conference/Team Rounds      Type: Team Rounds    Present: Dr. Merritt, Zofia Bray PA, Dr. Desai Neuropsychologist, Ania Diamond PT, Ashely Spence OT, Marquita Russ SLP, Cora Mooney , Michelle Sandhu RD, Alvino Kruse RN, and Alfredo Burnham Patient.      Discharge Barriers/Treatment/Education    Rehab Diagnosis: post L AKA    Active Medical Co-morbidities/Prognosis:     Patient Active Problem List   Diagnosis Code     Hypertension I10     Infrarenal abdominal aortic aneurysm (AAA) without rupture (H24) I71.43     Hypertension, unspecified type I10     Infrarenal abdominal aortic aneurysm, ruptured (H) I71.33     Acute kidney failure with tubular necrosis (H) N17.0     S/P above knee amputation, left (H) Z89.612       Safety: AxOx4; legal blindness secondary to macular degeneration; s/P L AKA; transfers A2 with sliding board. Has abdominal binder on at all times had exploratory laparotomy     Pain: Left limb pain managed by scheduled tylenol and prn oxycodone    Medications, Skin, Tubes/Lines: Patient has wound vac on his left AKA.    Swallowing/Nutrition: Soft bite size (6), moderately thick (3) liquid close supervision Fully upright all PO, small single sips/bites. Ok for ice chips via free water protocol (FWP). Plan to complete repeat VFSS 11/27 or earlier pending progress.  SLP    Bowel/Bladder: He is continent of bladder; uses urinal and to commode for voiding . He has episodes of loose BM previous night.     Psychosocial: Pt lives with spouse in accessible senior living apartment. Wife, Tabby, is home during the day. Per chart review, Psych consult was completed 11/10/23 and diagnosed with Adjustment disorder with depressed mood. Health psychology to follow patient approximately once per week for the duration of their hospitalization. Jaky (daughter), Génesis (daughter), John Paul (son) are all local.       ADLs/IADLs: Pt is setup for UB cares and max A for LB cares. Pt  making progress with transfers and using a slideboard most consistently with CGA/ min A. Pt is limited by endurance and post op precautions. Some cognitive deficits noted in motor planning transfers. Pt is early in his stay and is benefiting from skilled therapy.     Mobility: Pt early in rehab stay. Progressing w/ slideboard transfer, SBA and setup assist, Ax1-2 w/ nsg for safety. Pt with overall poor activity tolerance. [facility]. Goals for [place] w/c based w/ SPV for transfers d/t impaired cognition. Spouse able to provide assist if needed at discharge. HHPT.     Cognition/Language: Noting at least mild cognitive impairment re: attention, memory, executive functions. Informal assessment completed as vision impacting ability to fully participate in standardized assessment. Impaired cognition across noted other disciplines. Recommend IADL support and ongoing SLP       Community Re-Entry: Recommending ongoing therapies to promote improved safety and ind w/ mobility.     Transportation: Car transfer not a barrier. Family or friend will provide.     Decision maker: self    Plan of Care and goals reviewed and updated.    Discharge Plan/Recommendations    Fall Precautions: continue    Patient/Family input to goals: yes     Estimated length of stay: 18 days     Overall plan for the patient: reach a level of mod I       Utilization Review and Continued Stay Justification    Medical Necessity Criteria:    For any criteria that is not met, please document reason and plan for discharge, transfer, or modification of plan of care to address.    Requires intensive rehabilitation program to treat functional deficits?: Yes    Requires 3x per week or greater involvement of rehabilitation physician to oversee rehabilitation program?: Yes    Requires rehabilitation nursing interventions?: Yes    Patient is making functional progress?: Yes    There is a potential for additional functional progress?  Yes    Patient is participating in therapy 3 hours per day a minimum of 5 days per week or 15 hours per week in 7 day period?:Yes    Has discharge needs that require coordinated discharge planning approach?:Yes      Barriers/Concerns related to meeting medical necessity criteria:  None     Team Plan to Address Concern:  As needed      Final Physician Sign off    Statement of Approval:  Gilberto Merritt, DO      Patient Goals  Social Work Goals: Confirm discharge recommendations with therapy, coordinate safe discharge plan and remain available to support and assist as needed.    OT Predicted Duration/Target Date for Goal Attainment: 12/06/23  Therapy Frequency (OT): Daily     OT: Upper Body Dressing: Modified independent  OT: Lower Body Dressing: Modified independent  OT: Upper Body Bathing: Supervision/stand-by assist  OT: Lower Body Bathing: Supervision/stand-by assist     OT: Transfer: Supervision/stand-by assist (walk in shower transfer)  OT: Toilet Transfer/Toileting: Modified independent, toilet transfer, cleaning and garment management        OT: Cognitive: Patient/caregiver will verbalize understanding of cognitive assessment results/recommendations as needed for safe discharge planning              PT Predicted Duration/Target Date for Goal Attainment: 12/06/23  PT Frequency: Daily  PT: Bed Mobility: Supervision/stand-by assist  PT: Transfers: Supervision/stand-by assist  PT: Wheelchair Mobility: 150 feet, manual wheelchair, Caregiver SBA  PT: Goal 1: car transfer at supervision level  PT: Goal 2: pt will reclal 3 fall prevention methods for safe discharge home            SLP Predicted Duration/Target Date for Goal Attainment: 12/09/23  Therapy Frequency (SLP Eval): daily  SLP: Safely tolerate diet without signs/symptoms of aspiration: Regular diet, Slightly thick liquids, With use of swallow precautions, With use of compensatory swallow strategies   SLP: Pt with demonstrate and initiate a plan of mod  complexity with min cues 90% accuracy  SLP pt will recall novel and functional information of mild complexity with min cues 90% accuracy      Goal: Mobility: Pt will be educated on how to use assistive device to improve mobility  Goal: Skin Integrity: Pt will be educated on proper weight shift,  turning and repositionig to prevent skin breakdown     Goal: Safety Management: Pt will be educated on how to use the call light for assistance evidenced by absence of fall

## 2023-11-22 ENCOUNTER — APPOINTMENT (OUTPATIENT)
Dept: PHYSICAL THERAPY | Facility: CLINIC | Age: 68
DRG: 559 | End: 2023-11-22
Attending: PHYSICAL MEDICINE & REHABILITATION
Payer: COMMERCIAL

## 2023-11-22 ENCOUNTER — APPOINTMENT (OUTPATIENT)
Dept: SPEECH THERAPY | Facility: CLINIC | Age: 68
DRG: 559 | End: 2023-11-22
Attending: PHYSICAL MEDICINE & REHABILITATION
Payer: COMMERCIAL

## 2023-11-22 ENCOUNTER — APPOINTMENT (OUTPATIENT)
Dept: OCCUPATIONAL THERAPY | Facility: CLINIC | Age: 68
DRG: 559 | End: 2023-11-22
Attending: PHYSICAL MEDICINE & REHABILITATION
Payer: COMMERCIAL

## 2023-11-22 PROCEDURE — 250N000011 HC RX IP 250 OP 636: Performed by: PHYSICIAN ASSISTANT

## 2023-11-22 PROCEDURE — 92526 ORAL FUNCTION THERAPY: CPT | Mod: GN | Performed by: SPEECH-LANGUAGE PATHOLOGIST

## 2023-11-22 PROCEDURE — 97110 THERAPEUTIC EXERCISES: CPT | Mod: GP

## 2023-11-22 PROCEDURE — 97535 SELF CARE MNGMENT TRAINING: CPT | Mod: GO

## 2023-11-22 PROCEDURE — 99232 SBSQ HOSP IP/OBS MODERATE 35: CPT | Performed by: PHYSICIAN ASSISTANT

## 2023-11-22 PROCEDURE — 97130 THER IVNTJ EA ADDL 15 MIN: CPT | Mod: GN | Performed by: SPEECH-LANGUAGE PATHOLOGIST

## 2023-11-22 PROCEDURE — 97530 THERAPEUTIC ACTIVITIES: CPT | Mod: GP

## 2023-11-22 PROCEDURE — 128N000003 HC R&B REHAB

## 2023-11-22 PROCEDURE — 97129 THER IVNTJ 1ST 15 MIN: CPT | Mod: GN | Performed by: SPEECH-LANGUAGE PATHOLOGIST

## 2023-11-22 PROCEDURE — 250N000013 HC RX MED GY IP 250 OP 250 PS 637: Performed by: PHYSICIAN ASSISTANT

## 2023-11-22 RX ORDER — ONDANSETRON 4 MG/1
4 TABLET, ORALLY DISINTEGRATING ORAL EVERY 6 HOURS PRN
Status: DISCONTINUED | OUTPATIENT
Start: 2023-11-22 | End: 2023-12-01 | Stop reason: HOSPADM

## 2023-11-22 RX ADMIN — METOPROLOL TARTRATE 100 MG: 50 TABLET, FILM COATED ORAL at 20:39

## 2023-11-22 RX ADMIN — Medication 60 ML: at 09:50

## 2023-11-22 RX ADMIN — APIXABAN 5 MG: 5 TABLET, FILM COATED ORAL at 20:40

## 2023-11-22 RX ADMIN — HYDROXYZINE HYDROCHLORIDE 25 MG: 25 TABLET, FILM COATED ORAL at 22:34

## 2023-11-22 RX ADMIN — ACETAMINOPHEN 975 MG: 325 TABLET, FILM COATED ORAL at 13:44

## 2023-11-22 RX ADMIN — PANTOPRAZOLE SODIUM 40 MG: 40 TABLET, DELAYED RELEASE ORAL at 06:06

## 2023-11-22 RX ADMIN — AMLODIPINE BESYLATE 10 MG: 10 TABLET ORAL at 08:20

## 2023-11-22 RX ADMIN — ACETAMINOPHEN 975 MG: 325 TABLET, FILM COATED ORAL at 08:20

## 2023-11-22 RX ADMIN — METOPROLOL TARTRATE 100 MG: 50 TABLET, FILM COATED ORAL at 08:20

## 2023-11-22 RX ADMIN — SENNOSIDES 1 TABLET: 8.6 TABLET, FILM COATED ORAL at 08:21

## 2023-11-22 RX ADMIN — SENNOSIDES 1 TABLET: 8.6 TABLET, FILM COATED ORAL at 20:39

## 2023-11-22 RX ADMIN — QUETIAPINE FUMARATE 100 MG: 25 TABLET ORAL at 20:39

## 2023-11-22 RX ADMIN — ATORVASTATIN CALCIUM 10 MG: 10 TABLET, FILM COATED ORAL at 20:40

## 2023-11-22 RX ADMIN — PSYLLIUM HUSK 1 PACKET: 3.4 POWDER ORAL at 08:20

## 2023-11-22 RX ADMIN — APIXABAN 5 MG: 5 TABLET, FILM COATED ORAL at 08:20

## 2023-11-22 RX ADMIN — Medication 50 MCG: at 08:20

## 2023-11-22 RX ADMIN — ONDANSETRON 4 MG: 4 TABLET, ORALLY DISINTEGRATING ORAL at 14:27

## 2023-11-22 RX ADMIN — ACETAMINOPHEN 975 MG: 325 TABLET, FILM COATED ORAL at 20:40

## 2023-11-22 RX ADMIN — Medication 1 MG: at 20:40

## 2023-11-22 ASSESSMENT — ACTIVITIES OF DAILY LIVING (ADL)
ADLS_ACUITY_SCORE: 43

## 2023-11-22 NOTE — PLAN OF CARE
Discharge Planner Post-Acute Rehab SLP:     Discharge Plan: home with assist,  SLP. ENT for further VF follow up     Precautions: fall, aspiration, sternal     Current Status:  Hearing: WFL  Vision: impaired; uses magnifying glass to read large print  Communication: aphonic. VF injection completed by ENT   Cognition: Mild cognitive impairment re: attention, memory, executive functions. Informal assessment complete and pt limited by vision. Noting impaired cognition across other disiplines     Swallow: Easy to Chew (7), moderately thick (3) liquid. Set up assist meals, Fully upright all PO, small single sips/bites. Ok for ice chips via free water protocol (FWP)    Assessment: Pt seen for meal f/u 7/3 with breakfast meal. Pt with moderately thick (3) strawberry kiwi water, and pancake with syrup and butter. Pt self administered 3 sips via cup edge of strawberry kiwi water and 6 bites of pancake. Pt reported the pancake having a funny/abnormal taste. No s/sx of airway penetration/aspiration this day. Pt with recall of trained swallow exercises, towel and swallow and tongue to teeth. Pt given easy to chew menu; modified to larger print.     Other Barriers to Discharge (Family Training, etc): family training: diet pending progress, IADL support

## 2023-11-22 NOTE — PROGRESS NOTES
"  [facility]   Acute Rehabilitation Unit  Daily progress note    INTERVAL HISTORY  Alfredo Burnham was seen and examined at bedside this morning, with spouse present.  No acute events reported overnight.  He reports to be feeling ok overall.  Did not sleep great last night, woke around 3am and had some difficulty returning to sleep.  He notes some pain in left leg at surgical site, rates currently about 5/10.  He denies any abdominal, back, or chest pain.  Is having some pain in neck and upper shoulders at time, aggravated by prolonged sitting.  Did some stretching with one of the therapists a few days ago, which he found quite helpful.  He denies any shortness of breath, dizziness or lightheadedness, nausea, upset stomach.  Reports appetite is \"pretty good\".  Had some loose stools earlier this admission but improved with medication changes.  Had some questions about equipment and medications for discharge, reviewed.    With therapies, he is currently mod I for rolling, mod A for supine<>sit, SBA and set-up assist for slide board transfers.  PT did some impromptu family training with spouse today, who is now ok'd to assist with slideboard transfers bed<>wheelchair only.  Still needing Ax1-2 for transfers with nursing.  [facility]    MEDICATIONS   acetaminophen  975 mg Oral TID    amLODIPine  10 mg Oral Daily    apixaban ANTICOAGULANT  5 mg Oral BID    atorvastatin  10 mg Oral QPM    melatonin  1 mg Oral QPM    metoprolol tartrate  100 mg Oral BID    pantoprazole  40 mg Oral QAM AC    psyllium  1 packet Oral Daily    QUEtiapine  100 mg Oral or Feeding Tube At Bedtime    sennosides  1 tablet Oral BID    Vitamin D3  50 mcg Oral Daily    wound support modular  60 mL Oral Daily        acetaminophen, calcium carbonate, diphenhydrAMINE-zinc acetate, docusate sodium, hydrOXYzine, naloxone **OR** naloxone **OR** naloxone **OR** naloxone, " "oxyCODONE, - MEDICATION INSTRUCTIONS -, polyethylene glycol, QUEtiapine     PHYSICAL EXAM  /79 (BP Location: Right arm)   Pulse 97   Temp 97.9  F (36.6  C) (Oral)   Resp 16   Ht 1.702 m (5' 7\")   Wt 64.4 kg (141 lb 15.6 oz)   SpO2 98%   BMI 22.24 kg/m    Gen: NAD, lying in bed  HEENT: NC/AT, MMM  Cardio: appears well-perfused  Pulm: non-labored on room air  Abd: soft, non-tender, non-distended  Ext: no edema in RLE, LLE with sock in place, incision not visualized but small bloody drainage on sock  Neuro/MSK: awake, alert, right facial droop, anisocoria (L>R), hypophonia/dysphonia    LABS  CBC RESULTS:   Recent Labs   Lab Test 11/20/23  0617 11/17/23  0557 11/16/23  0704   WBC 7.1 7.3 8.6   RBC 2.55* 2.50* 2.56*   HGB 7.8* 7.7* 7.8*   HCT 24.4* 24.4* 24.4*   MCV 96 98 95   MCH 30.6 30.8 30.5   MCHC 32.0 31.6 32.0   RDW 15.7* 16.2* 16.6*   * 107* 105*       Last Basic Metabolic Panel:  Recent Labs   Lab Test 11/21/23  0749 11/20/23  0617 11/17/23  0557 11/16/23  0704   NA  --  133* 130* 130*   POTASSIUM  --  4.6 4.8 5.2   CHLORIDE  --  100 100 99   CO2  --  25 20* 22   ANIONGAP  --  8 10 9   * 102* 118* 114*   BUN  --  58.7* 58.2* 61.4*   CR  --  1.96* 1.97* 2.09*   GFRESTIMATED  --  36* 36* 33*   OMAR  --  9.4 9.5 9.9       Rehabilitation - continue comprehensive acute inpatient rehabilitation program with multidisciplinary approach including therapies, rehab nursing, and physiatry following. See interval history for updates.      ASSESSMENT AND PLAN  Alfredo Burnham is a 68 year old  right hand dominant male with a past medical history of hypertension, TIA, 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, thrombocytopenia, and tobacco use disorder who was admitted on 9/24/23 with ruptured pararenal abdominal aortic aneurysm s/p open repair complicated by shock and colonic ischemic requiring multiple returns to OR for exploratory laparotomy, washout, and delayed closure with hospital course " complicated by LLE ischemia ultimately 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, hematochezia with concern for possible bowel ischemia (no intramural ischemia on ex lap), MAYA requiring CRRT/HD (now off), acute hypoxic respiratory failure requiring 2 separate intubations, PE/DVT, aspiration event, bilateral pleural effusions, acute blood loss anemia/thrombocytopenia (requiring multiple transfusions), multifocal embolic strokes, left psoas muscle and iliac hematomas, peripancreatic fluid collection and concern for possible pancreatitis, left vocal fold paralysis s/p injection 11/1, fevers, volume overload, delirium, tachycardia, exanthematous drug eruption/rash, malnutrition, dysphagia, hyperglycemia, loose stools, and multiple additional electrolyte abnormalities.  He is now admitted to ARU on 11/17/23 for multidisciplinary rehabilitation and ongoing medical management.        Admission to acute inpatient rehab 11/17/23.    Impairment group code: Amputation 05.3 Unilateral LE AKA - s/p left above knee amputation in setting of critical limb ischemia following contained AAA rupture s/p open AAA repair c/b strokes         PT, OT and SLP 60 minutes of each on a daily basis, in addition to rehab nursing and close management of physiatrist.       Impairment of ADL's: Noted to have  weakness, impaired balance,  baseline visual impairments (low vision), fatigue, impaired weight shifting, deconditioning, pain, new sternal and abdominal precautions, and LLE NWB status in setting of LLE AKA, all affecting his ability to safely and independently perform basic ADLs.  Goal for CGA or less with basic ADLs.     Impairment of mobility:  Noted to have  weakness, impaired balance,  baseline visual impairments (low vision), fatigue, impaired weight shifting, deconditioning, pain, new sternal and abdominal precautions, and LLE NWB status in setting of LLE AKA, all affecting his ability to safely and independently perform basic mobility.  Goal for CGA or  less with basic transfers and likely mixed mobility vs wheelchair based.     Impairment of cognition/language/swallow:  Noted to have moderate to severe pharyngeal dysphagia and dysphonia with goals for safe tolerance of least restrictive diet and improved cognitive-linguistic skills to meet basic needs.     Medical Conditions  New actions/orders/updates for today are in blue.     Acute critical LLE ischemia s/p L AKA on 10/17/23  Hematoma in LLE residual limb (10/25/23 CTA)  In setting of ruptured AAA as below  - Prevena vac in place at discharge to ARU.  However, vac was removed by vascular surgery on 11/18 due to concerns for bleeding/small hematoma and not conducive to hemostasis.  Assessed by vascular surgery 11/20, Prevena wound vac was replaced.  Appreciate assistance.  Plan to leave intact for 7 days, to be managed by vascular surgery.  Contact vascular surgery if any questions, concerns, or new bleeding episodes.  Leave LLE AKA wrapped with ACE.    - Vascular surgery planning to remove every other staple on 11/30  - Continue Expedite daily for wound healing  - [facility]  - [facility] LLE  - Pain management: Tylenol 975 mg TID scheduled + additional doses PRN, atarax 25 mg q6h PRN, oxycodone 5 mg q4h PRN, wean as able.  - Orthotist adjusted fit of FloTech limb protector 11/20 due to decreased swelling.  Appreciate assistance.  - Vascular surgery will follow 1-2x/week at ARU  - Continue PT/OT  - Follow up with vascular surgery   - [facility], secondarily follow up with amputee clinic here     Ruptured pararenal AAA s/p open repair 9/24/23 c/b shock, colonic ischemia  S/p multiple exploratory laparotomy and washout (9/25, 9/26, 9/29), fascia closure 10/2 with delayed primary skin closure on 10/20  - Wound care: Apply iodeine/betadine, allow to dry.  Reapply clean dry dressing.  OK to shower, do not scrub/soak.  Every other staple removed by vascular  "surgery 11/16, remainder to be removed by vascular surgery on 11/30  - Activity restrictions: No lifting, pushing, or pulling greater than 10 pounds and no strenuous exercise for 4-6 weeks.   - Abdominal binder on at all times  - Statin as below  - Pain management as above  - Vascular surgery will follow 1-2x/week at ARU  - Follow up with vascular surgery     Acute ischemic stroke of multiple vessel territory due to embolic stroke of undetermined source (ESUS)   Code stroke 10/6 for new R facial droop and anisocoria.  CT head was without acute stroke or bleed, CTA was without proximal large vessel occlusion but did reveal concern for pulmonary embolism and had a normal perfusion scan. He was not a candidate for thrombolysis based on being outside 4.5 hours conventional time window, multiple major surgeries, thrombocytopenia and concern for GI bleed. He was not a candidate for thrombectomy considering absence of proximal large vessel occlusion.  MRI with above findings.  - BP management as below  - Apixaban as below  - Continue atorvastatin 10 mg daily  - Follow up with stroke neurology in 4-6 weeks     PE (incidental finding on CTA head/neck 10/6/23 for stroke code)  DVT LLE (U/S 10/7/23)  Initially on Heparin drip, required multiple brief holds due to bleeding concerns as below.  Ultimately transitioned to DOAC on 11/15.  - Continue apixaban 5 mg BID  - Per discharge summary, \"he will need [facility] for [facility] effective co-pays, otherwise his co-pays will be very expensive.\"   - Monitor respiratory status, continue supplemental oxygen PRN to maintain O2 sats >92%     Left iliac hematoma (10/13/23 CT)  Left psoas muscle hematoma (10/24/23 CTA)  Stable on repeat imaging.  Could contribute to weakness.  - Monitor Hgb as below and signs/symptoms of worsening bleeding     Anemia, multifactorial including acute blood loss, renal disease  Thrombocytopenia  Required multiple transfusions throughout course.  Hgb stable in " 7-8s. Platelets 107 on 11/17.  11/20: Hgb stable at 7.8; platelets stable at 142  - Monitor for evidence of recurrent bleeding  - Continue to trend CBC every M/Th     Tachycardia, suspect multifactorial including debility, pain, hydration status, PE  Hx hypertension  PTA on amlodipine 5 mg, benazepril 20 mg.  Held earlier this admission due to shock.  Restarted on amlodipine and added metoprolol due to persistent tachycardia.    - Continue amlodipine 10 mg daily  - Continue metoprolol tartrate 100 mg BID  - No ACE for now per vascular surgery  - Monitor BP/HR per unit routine     MAYA, improved  Renal emboli  Baseline Cr ~1.0.  CT with scattered areas of infarct related to emboli with AAA.  Cr rising after open AAA repair, peaked >5.  Started on CRRT 9/30, transitioned to HD on 10/5, last run on 10/27, HD line removed 11/2.  Cr most recently 1.9-2.1 range.  11/20: Cr stable at 1.96  - Avoid nephrotoxins as able  - Trend renal function every M/Th  - Follow up in CKD/post-MAYA clinic     Hyponatremia  Started around 11/9, has been in 132-134 range since then, but lower at 130 on 11/16-11/17.  11/20: Na stable at 133  - Trend BMP every M/Th  - Further work-up if persists     Left vocal fold paralysis/glottic insufficiency s/p laryngoscopy with injection laryngoplasty on 11/1  Dysphonia  Moderate-Severe Oropharyngeal Dysphagia   ENT consulted 10/28, fiberoptic endoscopy completed.  S/p injection 11/1.  - Continue SLP  - Easy-to-chew (level 7) diet; moderately thick (level 3) liquids. Ok for free water protocol per SLP.  Per SLP, benefits from set-up assist and intermittent supervision during meals, needs to be fully upright.  Ok to leave thickened liquids within patient's reach if upright in order to promote improved hydration.  - Per SLP, plan for repeat VFSS in ~2 weeks (~12/1)  - Follow up with ENT in 1 month (~12/1)     Delirium, multifactorial, improving  - Delirium precautions, sleep hygiene  - Continue HS seroquel  100 mg, seroquel 25 mg BID PRN for agitation.  Wean as able at ARU.  - Continue melatonin 1 mg     Adjustment disorder with depressed mood   Seen by psychology during acute hospital stay.  Not on medications.  - Team noting concerns for depressed mood.  Psychology consult placed, appreciate assistance  - Continue to monitor     Severe malnutrition in the context of acute illness/disease   Peripancreatic fluid collection on CT 10/13, concern possible pancreatitis, improved on repeat imaging  Previously on tube feeding, tube removed 11/13 per patient/family request.  Juvencio counts with a lot of missing information, but reportedly improving intake.  Recent fecal elastase normalized, no ongoing need for Creon (concerns for possibly pancreatitis earlier this admission).   - Easy-to-chew (level 7) diet; moderately thick (level 3) liquid  - RD consulted, appreciate assistance  - Supplements per RD recs     Hx bilateral legal 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  - Noted       Adjustment to disability:  Clinical psychology to eval and treat if indicated  FEN: easy-to-chew (level 7) diet; moderately thick (level 3) liquids.  1:1 supervision for PO intake.  Bowel: continent.  On sennosides 1 tab BID (decreased 11/20), psyllium daily.  Monitor and adjust regimen as indicated.  Bladder: continent, PVRs at admission but since improved.  Encourage double voiding, optimize positioning.    DVT Prophylaxis: on apixaban as above  GI Prophylaxis: PPI  Code: full code  Disposition: goal for home  ELOS:  target 12/6/23  Follow up Appointments on Discharge: PCP in 1-2 weeks, vascular surgery, stroke neurology in 4-6 weeks, ENT (~12/1), nephrology, PM&R ([facility] or [facility] Peck [facility])      IZofia, spent a total of 40 minutes face-to-face or managing the care of Alfredo Burnham. Over 50% of my time on the unit was spent counseling the patient and coordinating care. See note for details.       Zofia Bray,  PA-C  Physical Medicine & Rehabilitation

## 2023-11-22 NOTE — PLAN OF CARE
FOCUS/GOAL  Medication management, Psychosocial needs, and Safety management    ASSESSMENT, INTERVENTIONS AND CONTINUING PLAN FOR GOAL:  Pt is alert and oriented, sleeping throughout most of the shift.  Very quiet, uses call light appropriately.  Continent of bowel and bladder, changed into boxers this AM. Transfers assist of one with sliding board.  Goal Outcome Evaluation:

## 2023-11-22 NOTE — PLAN OF CARE
Discharge Planner Post-Acute Rehab PT:      Discharge Plan: Home with spouse (accessible apt). Home care PT.     Precautions: L AKA: NWB LLE, abdominal, wound vac LLE, moderate thick liquids, visual deficits     Current Status: *Spouse ok to assist slideboard bed<>w/c only.   Bed Mobility: Jenny rolling, sup<>sit up to modA trunk assist  Transfer: SBA and setup assist w/ slideboard. Ax1-2 w/ nsg.   Gait: Unable, unsafe  Stairs: Unable, unsafe  Balance: Fair dynamic sitting. Requires CGA for quasistatic standing in // bars     Assessment: Impromptu family training w/ spouse, ok to assist with slideboard transfers bed<>w/c only. Further training needed w/ OT for commode tx. [facility] for [facility] staff in following up regarding D[facility] order.      Other Barriers to Discharge (DME, Family Training, etc):   DME: [facility] (order faxed on 11/21): commode, tub bench, gait belt, wheelchair, slide board, bedrail.     Family training: ongoing w/ spouse    Home measurements sheet provided 11/20.

## 2023-11-22 NOTE — PLAN OF CARE
Goal Outcome Evaluation:  Pt is alert and oriented x 4, he denies pain or discomfort. He is on scheduled Tylenol. He has been using the call light appropriately and as needed for assistance. He transfers with assist of one person using a sliding board. Now the wife is also allowed to help him with transfer using a sliding board. Dressing to abdomen was changed. The incision it looks clan, and dry. Still has some staples in place. He has dry and cracking skin on the right heel, mepilex dressing was applied to the area. Wound vac to the L AKA is intact. We will continue to monitor for safety and  skin condition while assisting with activity of daily livings.

## 2023-11-22 NOTE — PLAN OF CARE
Discharge Planner Post-Acute Rehab SLP:     Discharge Plan: home with assist,  SLP. ENT for further VF follow up     Precautions: fall, aspiration, sternal     Current Status:  Hearing: WFL  Vision: impaired; uses magnifying glass to read large print  Communication: aphonic. VF injection completed by ENT   Cognition: Mild cognitive impairment re: attention, memory, executive functions. Informal assessment complete and pt limited by vision. Noting impaired cognition across other disiplines     Swallow: Easy to Chew (7), moderately thick (3) liquid. Set up assist meals, Fully upright all PO, small single sips/bites. Ok for ice chips via free water protocol (FWP)    Assessment: SLP: f/u meal 7,3. trial reg as indicated. cont. cog assessment via various non visual subtests. trial magnify glass for visual. trial FWP with water, add if ok. oropharyngeal exercises, into log. repeat VFSS in 11/27 or earlier pending progress     Other Barriers to Discharge (Family Training, etc): family training: diet pending progress, IADL support

## 2023-11-23 ENCOUNTER — APPOINTMENT (OUTPATIENT)
Dept: OCCUPATIONAL THERAPY | Facility: CLINIC | Age: 68
DRG: 559 | End: 2023-11-23
Attending: PHYSICAL MEDICINE & REHABILITATION
Payer: COMMERCIAL

## 2023-11-23 LAB
ANION GAP SERPL CALCULATED.3IONS-SCNC: 11 MMOL/L (ref 7–15)
BUN SERPL-MCNC: 56.7 MG/DL (ref 8–23)
CALCIUM SERPL-MCNC: 9.8 MG/DL (ref 8.8–10.2)
CHLORIDE SERPL-SCNC: 101 MMOL/L (ref 98–107)
CREAT SERPL-MCNC: 2.01 MG/DL (ref 0.67–1.17)
DEPRECATED HCO3 PLAS-SCNC: 23 MMOL/L (ref 22–29)
EGFRCR SERPLBLD CKD-EPI 2021: 35 ML/MIN/1.73M2
ERYTHROCYTE [DISTWIDTH] IN BLOOD BY AUTOMATED COUNT: 15.5 % (ref 10–15)
GLUCOSE SERPL-MCNC: 120 MG/DL (ref 70–99)
HCT VFR BLD AUTO: 25.5 % (ref 40–53)
HGB BLD-MCNC: 8.1 G/DL (ref 13.3–17.7)
MAGNESIUM SERPL-MCNC: 1.9 MG/DL (ref 1.7–2.3)
MCH RBC QN AUTO: 30.5 PG (ref 26.5–33)
MCHC RBC AUTO-ENTMCNC: 31.8 G/DL (ref 31.5–36.5)
MCV RBC AUTO: 96 FL (ref 78–100)
PHOSPHATE SERPL-MCNC: 4.5 MG/DL (ref 2.5–4.5)
PLATELET # BLD AUTO: 150 10E3/UL (ref 150–450)
POTASSIUM SERPL-SCNC: 4.9 MMOL/L (ref 3.4–5.3)
RBC # BLD AUTO: 2.66 10E6/UL (ref 4.4–5.9)
SODIUM SERPL-SCNC: 135 MMOL/L (ref 135–145)
WBC # BLD AUTO: 8.9 10E3/UL (ref 4–11)

## 2023-11-23 PROCEDURE — 85027 COMPLETE CBC AUTOMATED: CPT | Performed by: PHYSICIAN ASSISTANT

## 2023-11-23 PROCEDURE — 80048 BASIC METABOLIC PNL TOTAL CA: CPT | Performed by: PHYSICIAN ASSISTANT

## 2023-11-23 PROCEDURE — 97535 SELF CARE MNGMENT TRAINING: CPT | Mod: GO

## 2023-11-23 PROCEDURE — 128N000003 HC R&B REHAB

## 2023-11-23 PROCEDURE — 84100 ASSAY OF PHOSPHORUS: CPT | Performed by: PHYSICIAN ASSISTANT

## 2023-11-23 PROCEDURE — 83735 ASSAY OF MAGNESIUM: CPT | Performed by: PHYSICIAN ASSISTANT

## 2023-11-23 PROCEDURE — 36415 COLL VENOUS BLD VENIPUNCTURE: CPT | Performed by: PHYSICIAN ASSISTANT

## 2023-11-23 PROCEDURE — 250N000013 HC RX MED GY IP 250 OP 250 PS 637: Performed by: PHYSICIAN ASSISTANT

## 2023-11-23 RX ADMIN — APIXABAN 5 MG: 5 TABLET, FILM COATED ORAL at 19:53

## 2023-11-23 RX ADMIN — Medication 50 MCG: at 09:09

## 2023-11-23 RX ADMIN — Medication 60 ML: at 09:10

## 2023-11-23 RX ADMIN — ACETAMINOPHEN 975 MG: 325 TABLET, FILM COATED ORAL at 09:09

## 2023-11-23 RX ADMIN — PSYLLIUM HUSK 1 PACKET: 3.4 POWDER ORAL at 09:09

## 2023-11-23 RX ADMIN — METOPROLOL TARTRATE 100 MG: 50 TABLET, FILM COATED ORAL at 09:09

## 2023-11-23 RX ADMIN — ATORVASTATIN CALCIUM 10 MG: 10 TABLET, FILM COATED ORAL at 19:53

## 2023-11-23 RX ADMIN — APIXABAN 5 MG: 5 TABLET, FILM COATED ORAL at 09:09

## 2023-11-23 RX ADMIN — PANTOPRAZOLE SODIUM 40 MG: 40 TABLET, DELAYED RELEASE ORAL at 06:27

## 2023-11-23 RX ADMIN — ACETAMINOPHEN 975 MG: 325 TABLET, FILM COATED ORAL at 19:52

## 2023-11-23 RX ADMIN — AMLODIPINE BESYLATE 10 MG: 10 TABLET ORAL at 09:09

## 2023-11-23 RX ADMIN — SENNOSIDES 1 TABLET: 8.6 TABLET, FILM COATED ORAL at 09:08

## 2023-11-23 RX ADMIN — QUETIAPINE FUMARATE 100 MG: 25 TABLET ORAL at 19:52

## 2023-11-23 RX ADMIN — ACETAMINOPHEN 975 MG: 325 TABLET, FILM COATED ORAL at 14:37

## 2023-11-23 RX ADMIN — Medication 1 MG: at 19:50

## 2023-11-23 RX ADMIN — CALCIUM CARBONATE (ANTACID) CHEW TAB 500 MG 1000 MG: 500 CHEW TAB at 12:13

## 2023-11-23 RX ADMIN — METOPROLOL TARTRATE 100 MG: 50 TABLET, FILM COATED ORAL at 19:50

## 2023-11-23 ASSESSMENT — ACTIVITIES OF DAILY LIVING (ADL)
ADLS_ACUITY_SCORE: 39

## 2023-11-23 NOTE — PLAN OF CARE
Discharge Planner Post-Acute Rehab OT:     Discharge Plan: home with assist as needed and HH OT    Precautions: fall, abdominal, NWB of LLE, wound vac on LLE, moderate thick liquids    Current Status:  ADLs:  Mobility: Wc based, slide board Ax1, *wife cleared to assist bed<->w/c via slideboard, Nursing Assist 1-2 Via Slideboard, OT SBT bed to droparm padded commode w/ min A and cues for tech and commode to w/c w/ th placing sliding board and providing min  A and directives for technique - both trnasfers toward R on 11/23/23  Grooming: set up seated  Dressing: UB: set up, LB min A in supine, on 11/23 mod A w/ use of reacher from commode after th provided instructions  Bathing: Ax2 using slideboard to rolling blue and white shower chair, min A for bathing seated  Toileting: slide board bed to commode or Wc to commode over the toilet with Ax1 in therapy, Ax2 with nursing, mod A for clothing management and total assist rear pericares  IADLs: Wife can assist  Vision/Cognition: baseline visual deficits as pt is legally blind, need to further assess cognition.    Assessment: tx focus dressing and mobility and toileting, educ on use of reacher w/ LB drg , and use of SBT. Pt progressing, mild memory deficits noted. Recommend further assess of functional cog PRN, pain in L stump when bumps it.  Other Barriers to Discharge (DME, Family Training, etc): Family training, DME

## 2023-11-23 NOTE — PLAN OF CARE
Goal Outcome Evaluation:      Plan of Care Reviewed With: patient    Overall Patient Progress: no changeOverall Patient Progress: no change     Patient alert and oriented x4, able to make needs known and use call light, bed alarm on for safety. Wound vac in place, no drainage noted. Wound dressing site CDI. Patient requested PRN atarax at bedtime. Denies SOB, chest pain and light headedness.

## 2023-11-23 NOTE — PLAN OF CARE
Goal Outcome Evaluation:      Plan of Care Reviewed With: patient    Overall Patient Progress: improvingOverall Patient Progress: improving    Alert and oriented x 4, denies headache or dizziness, poor appetite this morning, fluids encouraged. L AKA dressing re-wrapped, wound vac dressing intact and suctioning, wife at bedside, will continue poc

## 2023-11-23 NOTE — PLAN OF CARE
Goal Outcome Evaluation:      Plan of Care Reviewed With: patient    Overall Patient Progress: no changeOverall Patient Progress: no change    Patient is alert and oriented x4. Make needs known. Continent of bladder using the urinal. Transfers as A1 slideboard. Independent in bed mobility. No complaints of pain, sob, dizziness, fever or any weakness. Abdominal binder on at all times. No expressed needs overnight. Continue poc.

## 2023-11-24 ENCOUNTER — APPOINTMENT (OUTPATIENT)
Dept: PHYSICAL THERAPY | Facility: CLINIC | Age: 68
DRG: 559 | End: 2023-11-24
Attending: PHYSICAL MEDICINE & REHABILITATION
Payer: COMMERCIAL

## 2023-11-24 ENCOUNTER — APPOINTMENT (OUTPATIENT)
Dept: SPEECH THERAPY | Facility: CLINIC | Age: 68
DRG: 559 | End: 2023-11-24
Attending: PHYSICAL MEDICINE & REHABILITATION
Payer: COMMERCIAL

## 2023-11-24 ENCOUNTER — APPOINTMENT (OUTPATIENT)
Dept: OCCUPATIONAL THERAPY | Facility: CLINIC | Age: 68
DRG: 559 | End: 2023-11-24
Attending: PHYSICAL MEDICINE & REHABILITATION
Payer: COMMERCIAL

## 2023-11-24 PROCEDURE — 97110 THERAPEUTIC EXERCISES: CPT | Mod: GP

## 2023-11-24 PROCEDURE — 97535 SELF CARE MNGMENT TRAINING: CPT | Mod: GO | Performed by: STUDENT IN AN ORGANIZED HEALTH CARE EDUCATION/TRAINING PROGRAM

## 2023-11-24 PROCEDURE — 250N000013 HC RX MED GY IP 250 OP 250 PS 637: Performed by: PHYSICIAN ASSISTANT

## 2023-11-24 PROCEDURE — 92526 ORAL FUNCTION THERAPY: CPT | Mod: GN

## 2023-11-24 PROCEDURE — 99231 SBSQ HOSP IP/OBS SF/LOW 25: CPT | Mod: GC | Performed by: STUDENT IN AN ORGANIZED HEALTH CARE EDUCATION/TRAINING PROGRAM

## 2023-11-24 PROCEDURE — 128N000003 HC R&B REHAB

## 2023-11-24 PROCEDURE — 90791 PSYCH DIAGNOSTIC EVALUATION: CPT | Performed by: CLINICAL NEUROPSYCHOLOGIST

## 2023-11-24 PROCEDURE — 97129 THER IVNTJ 1ST 15 MIN: CPT | Mod: GN

## 2023-11-24 PROCEDURE — 97110 THERAPEUTIC EXERCISES: CPT | Mod: GO | Performed by: STUDENT IN AN ORGANIZED HEALTH CARE EDUCATION/TRAINING PROGRAM

## 2023-11-24 PROCEDURE — 97530 THERAPEUTIC ACTIVITIES: CPT | Mod: GP

## 2023-11-24 RX ADMIN — HYDROXYZINE HYDROCHLORIDE 25 MG: 25 TABLET, FILM COATED ORAL at 16:55

## 2023-11-24 RX ADMIN — SENNOSIDES 1 TABLET: 8.6 TABLET, FILM COATED ORAL at 08:12

## 2023-11-24 RX ADMIN — Medication 1 MG: at 19:36

## 2023-11-24 RX ADMIN — APIXABAN 5 MG: 5 TABLET, FILM COATED ORAL at 19:37

## 2023-11-24 RX ADMIN — ACETAMINOPHEN 975 MG: 325 TABLET, FILM COATED ORAL at 19:36

## 2023-11-24 RX ADMIN — METOPROLOL TARTRATE 100 MG: 50 TABLET, FILM COATED ORAL at 08:13

## 2023-11-24 RX ADMIN — OXYCODONE HYDROCHLORIDE 5 MG: 5 TABLET ORAL at 19:36

## 2023-11-24 RX ADMIN — QUETIAPINE FUMARATE 100 MG: 25 TABLET ORAL at 19:38

## 2023-11-24 RX ADMIN — CALCIUM CARBONATE (ANTACID) CHEW TAB 500 MG 1000 MG: 500 CHEW TAB at 15:32

## 2023-11-24 RX ADMIN — APIXABAN 5 MG: 5 TABLET, FILM COATED ORAL at 08:12

## 2023-11-24 RX ADMIN — PSYLLIUM HUSK 1 PACKET: 3.4 POWDER ORAL at 08:12

## 2023-11-24 RX ADMIN — ACETAMINOPHEN 975 MG: 325 TABLET, FILM COATED ORAL at 13:14

## 2023-11-24 RX ADMIN — Medication 50 MCG: at 08:12

## 2023-11-24 RX ADMIN — AMLODIPINE BESYLATE 10 MG: 10 TABLET ORAL at 08:13

## 2023-11-24 RX ADMIN — Medication 60 ML: at 10:35

## 2023-11-24 RX ADMIN — ATORVASTATIN CALCIUM 10 MG: 10 TABLET, FILM COATED ORAL at 19:38

## 2023-11-24 RX ADMIN — PANTOPRAZOLE SODIUM 40 MG: 40 TABLET, DELAYED RELEASE ORAL at 05:57

## 2023-11-24 RX ADMIN — METOPROLOL TARTRATE 100 MG: 50 TABLET, FILM COATED ORAL at 19:38

## 2023-11-24 RX ADMIN — ACETAMINOPHEN 975 MG: 325 TABLET, FILM COATED ORAL at 08:12

## 2023-11-24 ASSESSMENT — ACTIVITIES OF DAILY LIVING (ADL)
ADLS_ACUITY_SCORE: 40
ADLS_ACUITY_SCORE: 38
ADLS_ACUITY_SCORE: 40
ADLS_ACUITY_SCORE: 38
ADLS_ACUITY_SCORE: 38
ADLS_ACUITY_SCORE: 40
ADLS_ACUITY_SCORE: 40
ADLS_ACUITY_SCORE: 39
ADLS_ACUITY_SCORE: 38

## 2023-11-24 NOTE — PROGRESS NOTES
A/O x4, RA, denies SOB/CP. Easy to chew/moderately thick diet. Continent of B&B. Loose stools x2 11/23 per report. Assist x1 w/ sliding board. Wound vac to L AKA. No significant changes on shift. Will continue POC.

## 2023-11-24 NOTE — PLAN OF CARE
Discharge Planner Post-Acute Rehab PT:      Discharge Plan: Home with spouse (accessible apt). Home care PT.     Precautions: L AKA: NWB LLE, abdominal, wound vac LLE, moderate thick liquids, visual deficits     Current Status: *Spouse ok to assist slideboard bed<>w/c only.   Bed Mobility: Jenny rolling, sup<>sit up to modA trunk assist  Transfer: SBA and setup assist w/ slideboard. Ax1-2 w/ nsg.   Gait: Unable, unsafe  Stairs: Unable, unsafe  Balance: Fair dynamic sitting. Requires CGA for quasistatic standing in // bars     Assessment: Progressing with uneven slideboard transfers, only needing Autumn but still very effortful for pt.      Other Barriers to Discharge (DME, Family Training, etc):   [facility] (order faxed on 11/21): commode, tub bench, gait belt, wheelchair, slide board, bedrail.     Family training: ongoing w/ spouse    Home measurements sheet provided 11/20.

## 2023-11-24 NOTE — PLAN OF CARE
Discharge Planner Post-Acute Rehab OT:     Discharge Plan: home with assist as needed and HH OT    Precautions: fall, abdominal, NWB of LLE, wound vac on LLE, moderate thick liquids    Current Status:  ADLs:  Mobility: Wc based, slide board Ax1, *wife cleared to assist bed<->w/c via slideboard, Nursing Assist 1-2 Via Slideboard, OT SBT bed to droparm padded commode w/ min A and cues for tech and commode to w/c w/ th placing sliding board and providing min  A and directives for technique - both trnasfers toward R on 11/23/23  Grooming: set up seated  Dressing: UB: set up, LB min A in supine, on 11/23 mod A w/ use of reacher from commode after th provided instructions  Bathing: Ax2 using slideboard to rolling blue and white shower chair, min A for bathing seated  Toileting: slide board bed to commode or Wc to commode over the toilet with Ax1 in therapy, Ax2 with nursing, mod A for clothing management and total assist rear pericares  IADLs: Wife can assist  Vision/Cognition: baseline visual deficits as pt is legally blind, need to further assess cognition.    Assessment: Focus on LB dressing and slide board transfers. Making progress but did require min A to adjust over stump and cues for managing wound vac. Set up assist still required for SB transfers.     Other Barriers to Discharge (DME, Family Training, etc): Family training  [facility] including [facility], drop arm commode, tub bench, pt needs a bedrail and slide board

## 2023-11-24 NOTE — PLAN OF CARE
Goal Outcome Evaluation:      Plan of Care Reviewed With: patient    Overall Patient Progress: no changeOverall Patient Progress: no change    Pt is alert and oriented x4. Calls appropriately.  No complaints of pain, sob, dizziness, fever, nausea or new weakness.  Transfers as A1 slideboard but remained in bed throughout the shift. Independent in bed mobility.  Continent of bladder, uses the urinal w/ staff to empty. No Bm tonight.  Wound vac battery does not need to be charged, working well with no drainage noted in the cannister.  Sleeping during hourly rounds. No expressed issues overnight. Continue poc.

## 2023-11-24 NOTE — PLAN OF CARE
"Nursing Plan of Care Update    Plan of Care Reviewed With: patient     Overall Patient Progress: improving    FOCUS/GOAL  Bowel management, Bladder management, Nutrition/Feeding/Swallowing precautions, Medication management, Pain management, Wound care management, Medical management, Discharge planning, Mobility, Skin integrity, Cognition/Memory/Judgment/Problem solving, Equipment/Assistive devices, Reinforcement of self-care/ADL, Carryover of rehab techniques, Psychosocial needs, Safety management, Prevention of secondary complications, Discharge and Relevant Education, Unit Routine Orientation and Adjustment to lifestyle change     ASSESSMENT, INTERVENTIONS AND CONTINUING PLAN FOR GOAL:    Alert and oriented to person, place, time and situation.     Patient has had an initial visit with Psych. Somnolent/flat affect/withdrawn throughout evening. Cooperative and communicative, but patient did express he is \"feeing exhausted.\" He shared he was optimistic to be able to eat \"real food\" hopefully soon and that he drank water without thickener during a therapy session today. Encouragement and acknowledgement provided. Current diet order: Combination Diet Easy to Chew (level 7); Liquidized/Moderately Thick (level 3).    Wound care completed on abdominal incision. Heel wound care due 11/25. Wound vac to left leg amputation site clean, dry, intact with green light on. ACE wrap intact.     Pain reported to be in left leg only and patient described it as \"moderate\" but did not want more than Tylenol. Leg elevated on pillow. Patient also rests to help with pain control.     Patients wife at bedside earlier in evening helping and supportive. After dinner meal at 1700 patient turned all of his lights off and went to sleep.     bowel and bladder incontinence    Fall risk factors include but are not limited to: new L AKA, impaired strength, impaired balance, bowel and bladder incontinence, exhaustion, pain medications. "       Patient Vitals for the past 24 hrs:   BP Temp Temp src Pulse Resp SpO2 Weight   11/24/23 1702 127/80 98  F (36.7  C) Oral 92 18 98 % --   11/24/23 0811 (!) 147/81 -- -- -- -- -- --   11/24/23 0603 123/80 97.8  F (36.6  C) Oral 99 16 98 % 58.1 kg (128 lb 1.4 oz)   11/23/23 1950 127/75 -- -- 88 -- -- --        Handoff given to oncoming RN. VS stable and trended. No signs/symptoms of hypoglycemia. Patient is Type II Diabetic with BID glucose checks. Call light within reach at all times. Can make needs known. Continue with current plan of care.

## 2023-11-24 NOTE — PLAN OF CARE
Goal Outcome Evaluation:      Plan of Care Reviewed With: patient    Overall Patient Progress: improvingOverall Patient Progress: improving    Alert and oriented    X 4, denies headache or dizziness, denies cough or sob, denies any chest pain. Poor appetite at breakfast, ate 50% at lunch. Dressing on abd, R heel and wound vac intact. Wife at bedside and hands on, will continue poc

## 2023-11-24 NOTE — CONSULTS
"PSYCHOLOGY CONSULTATION - INITIAL NOTE  Start Time: 1600  Stop Time: 1625  Session Duration in Minutes: 25 minutes    REASON FOR CONSULTATION: Psychology consulted to provide interventions for adjustment disorder with depressed mood.     BACKGROUND PER EMR: Alfredo Burnham is a 68 year old  right hand dominant male with a past medical history of hypertension, TIA, 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, thrombocytopenia, and tobacco use disorder who was admitted on 9/24/23 with ruptured pararenal abdominal aortic aneurysm s/p open repair complicated by shock and colonic ischemic requiring multiple returns to OR for exploratory laparotomy, washout, and delayed closure with hospital course complicated by LLE ischemia ultimately 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, hematochezia with concern for possible bowel ischemia (no intramural ischemia on ex lap), MAYA requiring CRRT/HD (now off), acute hypoxic respiratory failure requiring 2 separate intubations, PE/DVT, aspiration event, bilateral pleural effusions, acute blood loss anemia/thrombocytopenia (requiring multiple transfusions), multifocal embolic strokes, left psoas muscle and iliac hematomas, peripancreatic fluid collection and concern for possible pancreatitis, left vocal fold paralysis s/p injection 11/1, fevers, volume overload, delirium, tachycardia, exanthematous drug eruption/rash, malnutrition, dysphagia, hyperglycemia, loose stools, and multiple additional electrolyte abnormalities.  He is now admitted to ARU on 11/17/23 for multidisciplinary rehabilitation and ongoing medical management.        Admission to acute inpatient rehab 11/17/23.      SUBJECTIVE: Met with Nelson and his wife this afternoon.  We discussed how \"the world keeps moving\" even though things appear to slow down when he was going through a major medical event.  He appeared upset that he now struggles to keep up with world events as keeping up with the news is very important to him.  He endorsed feeling " a wide range of emotions, including sadness, anger, frustration, and happiness.      His wife reported that things are much better since he moved from his acute stay to the ARU.  They are hopeful about a lot of things and generally feel like there is progress and things to look forward too.      Nelson denied any significant psychological needs.  He denied wanting to process anything, nor did he have anything he was pressed to discuss.       OBJECTIVE: Nelson was seen in his hospital room.  Thoughts were goal-directed and linear.  Speech was normal but soft at times.  Insight and judgment ws good.  No Si, HI, confusion, or dagmar noted.  Affect appeared consistent with mood, which ranged from euthymic to anxious to hopeful, and changed appropriately with context and conversation.     ASSESSMENT: Nelson's presentation appears to be consistent with an adjustment response - with some increased distress as it relates to his abilities to engage in his favorite past-time hobbies.  His mood appears to be stable to improved when compared to his initial inpatient health psychology consult.     DIAGNOSIS: Adjustment disorder with depressed mood    RECOMMENDATION/PLAN:  1)  Continue to follow Nelson at least once per week throughout his ARU stay.      For the team  1)  Nelson really values following current events and the news.  It may be helpful to incorporate this into therapy sessions.     Please feel free to call if urgent concerns arise prior to the next follow-up session.    Ivonne Desai PsyD, LOI  Clinical Neuropsychologist

## 2023-11-24 NOTE — PLAN OF CARE
Goal Outcome Evaluation:    7pm-11:30pm:      Plan of Care Reviewed With: patient    Overall Patient Progress: improvingOverall Patient Progress: improving    Outcome Evaluation: Pt alert and oriented x4. Needing Ax1-2 with slide board for transfers bed <> W/C. VSS. Denies pain.   Abdominal incision w/ staples CDI, new dressing applied. Abdominal binder on.  Right heel has blanchable erythema, new mepilex applied and heel suspended on pillow.   L AKA to prevena wound vac intact, ace wrap CDI.   Had incontinent of bm x2 today per report, held scheduled bowel medication.   Continent of urine, pt used urinal independently.   Continue with POC.

## 2023-11-24 NOTE — PROGRESS NOTES
"  [facility]   Acute Rehabilitation Unit  Daily progress note    INTERVAL HISTORY  Alfredo Burnham was seen and examined at bedside this morning, He reports no acute concerns or complaints. He mentioned that there is inconsistencies in the application of his LLE dressing. He reports that sometimes nursing applies it too loose and sometimes he worries it is too tight. We ensured him that we will touch base with nursing regarding consistency of sleeve/wrapping application.    ROS: 10 point ROS was assessed and was negative unless otherwise stated in HPI.    MEDICATIONS   acetaminophen  975 mg Oral TID    amLODIPine  10 mg Oral Daily    apixaban ANTICOAGULANT  5 mg Oral BID    atorvastatin  10 mg Oral QPM    melatonin  1 mg Oral QPM    metoprolol tartrate  100 mg Oral BID    pantoprazole  40 mg Oral QAM AC    psyllium  1 packet Oral Daily    QUEtiapine  100 mg Oral or Feeding Tube At Bedtime    sennosides  1 tablet Oral BID    Vitamin D3  50 mcg Oral Daily    wound support modular  60 mL Oral Daily        acetaminophen, calcium carbonate, diphenhydrAMINE-zinc acetate, docusate sodium, hydrOXYzine, menthol (Topical Analgesic) 2.5%, naloxone **OR** naloxone **OR** naloxone **OR** naloxone, ondansetron, oxyCODONE, - MEDICATION INSTRUCTIONS -, polyethylene glycol, QUEtiapine     PHYSICAL EXAM  BP (!) 147/81   Pulse 99   Temp 97.8  F (36.6  C) (Oral)   Resp 16   Ht 1.702 m (5' 7\")   Wt 58.1 kg (128 lb 1.4 oz)   SpO2 98%   BMI 20.06 kg/m    Gen: NAD, lying in bed  HEENT: NC/AT, MMM  Cardio: RRR, no murmurs rubs or gallops  Pulm: non-labored on room air  Abd: soft, non-tender, non-distended. Abdominal incision c/d/i  Ext: no edema in RLE, LLE with sock in place, No noticeable edema surrounding sock, no visible drainage.  Neuro/MSK: awake, alert, hypophonia    LABS  CBC RESULTS:   Recent Labs   Lab Test 11/23/23  1126 11/20/23  0617 11/17/23  0557   WBC 8.9 7.1 7.3   RBC 2.66* " 2.55* 2.50*   HGB 8.1* 7.8* 7.7*   HCT 25.5* 24.4* 24.4*   MCV 96 96 98   MCH 30.5 30.6 30.8   MCHC 31.8 32.0 31.6   RDW 15.5* 15.7* 16.2*    142* 107*       Last Basic Metabolic Panel:  Recent Labs   Lab Test 11/23/23  1126 11/21/23  0749 11/20/23  0617 11/17/23  0557     --  133* 130*   POTASSIUM 4.9  --  4.6 4.8   CHLORIDE 101  --  100 100   CO2 23  --  25 20*   ANIONGAP 11  --  8 10   * 104* 102* 118*   BUN 56.7*  --  58.7* 58.2*   CR 2.01*  --  1.96* 1.97*   GFRESTIMATED 35*  --  36* 36*   OMAR 9.8  --  9.4 9.5       Rehabilitation - continue comprehensive acute inpatient rehabilitation program with multidisciplinary approach including therapies, rehab nursing, and physiatry following. See interval history for updates.      ASSESSMENT AND PLAN  Alfredo Burnham is a 68 year old  right hand dominant male with a past medical history of hypertension, TIA, 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, thrombocytopenia, and tobacco use disorder who was admitted on 9/24/23 with ruptured pararenal abdominal aortic aneurysm s/p open repair complicated by shock and colonic ischemic requiring multiple returns to OR for exploratory laparotomy, washout, and delayed closure with hospital course complicated by LLE ischemia ultimately 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, hematochezia with concern for possible bowel ischemia (no intramural ischemia on ex lap), MAYA requiring CRRT/HD (now off), acute hypoxic respiratory failure requiring 2 separate intubations, PE/DVT, aspiration event, bilateral pleural effusions, acute blood loss anemia/thrombocytopenia (requiring multiple transfusions), multifocal embolic strokes, left psoas muscle and iliac hematomas, peripancreatic fluid collection and concern for possible pancreatitis, left vocal fold paralysis s/p injection 11/1, fevers, volume overload, delirium, tachycardia, exanthematous drug eruption/rash, malnutrition, dysphagia, hyperglycemia, loose stools, and multiple additional  electrolyte abnormalities.  He is now admitted to ARU on 11/17/23 for multidisciplinary rehabilitation and ongoing medical management.        --Vitals stable. No lab today.  --Will touch base with nursing to ensure proper communication with the patient regarding the fitting of his LLE sleeve/dressing.   --Continue ongoing medical management.  --Continue therapies and plan of care.      Seen and discussed with Dr. Yuen, PM&R staff physician       Indra Guerrero DO  PGY2  Physical Medicine and Rehabilitation-[facility]  Pager: [phone]

## 2023-11-24 NOTE — PLAN OF CARE
Discharge Planner Post-Acute Rehab SLP:     Discharge Plan: home with assist,  SLP. ENT for further VF follow up     Precautions: fall, aspiration, sternal     Current Status:  Hearing: WFL  Vision: impaired; uses magnifying glass to read large print  Communication: aphonic. VF injection completed by ENT   Cognition: Mild cognitive impairment re: attention, memory, executive functions. Informal assessment completed as pt is limited by vision. Noting impaired cognition across other disiplines     Swallow: Easy to Chew (7), moderately thick (3) liquid. Set up assist meals, Fully upright all PO, small single sips/bites. Ok for ice chips via free water protocol (FWP)    Assessment: Pt wiht meal of current diet (7,3) upon arrival. Set up assist provided. Pt with extended mastication but functional, timely AP transit, no oral residuals. Pt with no overt s/sx aspiration. Pt with limited recall of oropharyngeal exercises despite continuous reinforcement, education, and practice in therapy. Poor carryover of new learning. SLP re educated and trained in exercises for Marlin and CTAR + effortful swallow. pt completed x12 reps with min cues to initiate. Notable difficulty with stamina for exercises d/t fatigue this AM. Pt engaged in verbal-visual 'odd one out'. Pt with notable difficulty with immediate recall of items just presented verbally. Modified to written format via large font and pt able to read large print with 100% accuracy. Pt able to determine target 'difference' within more semantically related field with 90% IND, 100% min-mod cues. Pt able to add to category with 100% accuracy IND     Other Barriers to Discharge (Family Training, etc): family training: diet pending progress, IADL support

## 2023-11-25 ENCOUNTER — APPOINTMENT (OUTPATIENT)
Dept: SPEECH THERAPY | Facility: CLINIC | Age: 68
DRG: 559 | End: 2023-11-25
Attending: PHYSICAL MEDICINE & REHABILITATION
Payer: COMMERCIAL

## 2023-11-25 ENCOUNTER — APPOINTMENT (OUTPATIENT)
Dept: OCCUPATIONAL THERAPY | Facility: CLINIC | Age: 68
DRG: 559 | End: 2023-11-25
Attending: PHYSICAL MEDICINE & REHABILITATION
Payer: COMMERCIAL

## 2023-11-25 LAB
MAGNESIUM SERPL-MCNC: 2 MG/DL (ref 1.7–2.3)
PHOSPHATE SERPL-MCNC: 4.8 MG/DL (ref 2.5–4.5)
POTASSIUM SERPL-SCNC: 4.5 MMOL/L (ref 3.4–5.3)

## 2023-11-25 PROCEDURE — 97535 SELF CARE MNGMENT TRAINING: CPT | Mod: GO | Performed by: OCCUPATIONAL THERAPIST

## 2023-11-25 PROCEDURE — 92526 ORAL FUNCTION THERAPY: CPT | Mod: GN

## 2023-11-25 PROCEDURE — 97110 THERAPEUTIC EXERCISES: CPT | Mod: GO | Performed by: OCCUPATIONAL THERAPIST

## 2023-11-25 PROCEDURE — 97130 THER IVNTJ EA ADDL 15 MIN: CPT | Mod: GN

## 2023-11-25 PROCEDURE — 128N000003 HC R&B REHAB

## 2023-11-25 PROCEDURE — 250N000013 HC RX MED GY IP 250 OP 250 PS 637: Performed by: PHYSICIAN ASSISTANT

## 2023-11-25 PROCEDURE — 84100 ASSAY OF PHOSPHORUS: CPT | Performed by: PHYSICAL MEDICINE & REHABILITATION

## 2023-11-25 PROCEDURE — 36415 COLL VENOUS BLD VENIPUNCTURE: CPT | Performed by: PHYSICAL MEDICINE & REHABILITATION

## 2023-11-25 PROCEDURE — 97129 THER IVNTJ 1ST 15 MIN: CPT | Mod: GN

## 2023-11-25 PROCEDURE — 84132 ASSAY OF SERUM POTASSIUM: CPT | Performed by: PHYSICAL MEDICINE & REHABILITATION

## 2023-11-25 PROCEDURE — 83735 ASSAY OF MAGNESIUM: CPT | Performed by: PHYSICAL MEDICINE & REHABILITATION

## 2023-11-25 RX ADMIN — HYDROXYZINE HYDROCHLORIDE 25 MG: 25 TABLET, FILM COATED ORAL at 17:12

## 2023-11-25 RX ADMIN — AMLODIPINE BESYLATE 10 MG: 10 TABLET ORAL at 08:17

## 2023-11-25 RX ADMIN — QUETIAPINE FUMARATE 100 MG: 25 TABLET ORAL at 20:06

## 2023-11-25 RX ADMIN — DOCUSATE SODIUM 50 MG: 50 CAPSULE, LIQUID FILLED ORAL at 17:12

## 2023-11-25 RX ADMIN — PSYLLIUM HUSK 1 PACKET: 3.4 POWDER ORAL at 08:16

## 2023-11-25 RX ADMIN — METOPROLOL TARTRATE 100 MG: 50 TABLET, FILM COATED ORAL at 20:05

## 2023-11-25 RX ADMIN — Medication 50 MCG: at 08:17

## 2023-11-25 RX ADMIN — METOPROLOL TARTRATE 100 MG: 50 TABLET, FILM COATED ORAL at 08:17

## 2023-11-25 RX ADMIN — SENNOSIDES 1 TABLET: 8.6 TABLET, FILM COATED ORAL at 08:17

## 2023-11-25 RX ADMIN — ATORVASTATIN CALCIUM 10 MG: 10 TABLET, FILM COATED ORAL at 20:06

## 2023-11-25 RX ADMIN — ACETAMINOPHEN 975 MG: 325 TABLET, FILM COATED ORAL at 08:17

## 2023-11-25 RX ADMIN — Medication 60 ML: at 08:32

## 2023-11-25 RX ADMIN — CALCIUM CARBONATE (ANTACID) CHEW TAB 500 MG 500 MG: 500 CHEW TAB at 13:53

## 2023-11-25 RX ADMIN — Medication 1 MG: at 20:06

## 2023-11-25 RX ADMIN — APIXABAN 5 MG: 5 TABLET, FILM COATED ORAL at 08:17

## 2023-11-25 RX ADMIN — ACETAMINOPHEN 975 MG: 325 TABLET, FILM COATED ORAL at 20:05

## 2023-11-25 RX ADMIN — APIXABAN 5 MG: 5 TABLET, FILM COATED ORAL at 20:06

## 2023-11-25 RX ADMIN — ACETAMINOPHEN 975 MG: 325 TABLET, FILM COATED ORAL at 13:29

## 2023-11-25 RX ADMIN — PANTOPRAZOLE SODIUM 40 MG: 40 TABLET, DELAYED RELEASE ORAL at 06:02

## 2023-11-25 ASSESSMENT — ACTIVITIES OF DAILY LIVING (ADL)
ADLS_ACUITY_SCORE: 39
ADLS_ACUITY_SCORE: 39
ADLS_ACUITY_SCORE: 40
ADLS_ACUITY_SCORE: 40
ADLS_ACUITY_SCORE: 39
ADLS_ACUITY_SCORE: 40
ADLS_ACUITY_SCORE: 40
ADLS_ACUITY_SCORE: 39
ADLS_ACUITY_SCORE: 38
ADLS_ACUITY_SCORE: 38
ADLS_ACUITY_SCORE: 39
ADLS_ACUITY_SCORE: 40

## 2023-11-25 NOTE — PLAN OF CARE
Discharge Planner Post-Acute Rehab SLP:     Discharge Plan: home with assist,  SLP. ENT for further VF follow up     Precautions: fall, aspiration, sternal     Current Status:  Hearing: WFL  Vision: impaired; uses magnifying glass to read large print  Communication: aphonic. VF injection completed by ENT   Cognition: Mild cognitive impairment re: attention, memory, executive functions. Informal assessment completed as pt is limited by vision. Noting impaired cognition across other disiplines     Swallow: Easy to Chew (7), moderately thick (3) liquid. Set up assist meals, Fully upright all PO, small single sips/bites. Ok for ice chips via free water protocol (FWP)    Assessment:  Pt seen at noon meal with chicken quesadillas. Pt reports not feeling well with a headache, intake was limited to 3 bites. Do verbalized minimally increased difficulty with the tortilla. Limited intake to make judgement about pts ability to consume adequate amounts of regular solids without fatigue     PM: Portions of the RBANS not requiring visual acuity were performed. With the list learning task, pt scored 22/40, with the average amount of words able to be recalled in each trial being 6 out of 10. With story memory task, pt scored 12/24. Pt able to recall twice as many details after the second reading. For digit span, pt scores 10 out of 16. SLP:     Pt performed CTAR with effortful swallow x 4 sets of 5 reps. Pt c/o stomach not feeling good. Nursing was notified, met with pt, and brought meds     Other Barriers to Discharge (Family Training, etc): family training: diet pending progress, IADL support

## 2023-11-25 NOTE — PLAN OF CARE
Nursing Plan of Care Update    Plan of Care Reviewed With: patient     Overall Patient Progress: improving    FOCUS/GOAL  Bowel management, Bladder management, Nutrition/Feeding/Swallowing precautions, Medication management, Pain management, Wound care management, Medical management, Discharge planning, Mobility, Skin integrity, Cognition/Memory/Judgment/Problem solving, Equipment/Assistive devices, Reinforcement of self-care/ADL, Carryover of rehab techniques, Psychosocial needs, Safety management, Prevention of secondary complications, Discharge and Relevant Education, Unit Routine Orientation and Adjustment to lifestyle change     ASSESSMENT, INTERVENTIONS AND CONTINUING PLAN FOR GOAL:    Alert and oriented to person, place, time and situation.     Patient is eager to advance diet so he can consume foods he enjoys. Currently on Easy to Chew (7), moderately thick (3) liquid. Set up assist meals, Fully upright all PO, small single sips/bites. Ok for ice chips via free water protocol per OT.     Daily wound care completed on abdominal incision. Erythema on right heel covered with new protective Mepilex dressing. Educated patient on pressure points, importance of repositioning and protecting skin integrity. Patient responsive. Patients wife also pointed out that his arms have felt dry. Skin sometimes peels per wife. Lotion placed on bedside table and applied once today. Wound vac to left leg amputation site clean, dry, intact with green light on. ACE wrap intact tightly.     Mobility is WC based, slide board Ax1. Wife cleared to assist bed per therapy <-> w/c via slideboard, Nursing Assist 1-2 Via Slideboard.     Minimal pain reported. Patient took scheduled Tylenol once with positive effect per patient. Patient reports restless mind. PRN Atarax administered with positive effect per patient.     Patients wife at bedside earlier in evening helping and supportive. Scheduled Metoprolol given to patient after BP reading  of 144/80 at 2000. Patient appeared to be asleep in bed at end of shift with all lights off. Bed alarm on.       Patient Vitals for the past 24 hrs:   BP Temp Temp src Pulse Resp SpO2   11/25/23 2004 (!) 144/80 98  F (36.7  C) Oral 99 18 --   11/25/23 0643 131/80 98.1  F (36.7  C) Oral 97 16 97 %   11/25/23 0603 125/86 97.5  F (36.4  C) Oral 92 18 99 %     Handoff given to oncoming RN. VS stable and trended. No signs/symptoms of hypoglycemia. No glucose monitoring. Call light within reach at all times. Can make needs known. Continue with current plan of care.

## 2023-11-25 NOTE — PLAN OF CARE
Goal Outcome Evaluation:      Plan of Care Reviewed With: patient    Overall Patient Progress: no changeOverall Patient Progress: no change    Patient is alert and oriented. Able to use a call light and make his needs known. Assist of x 1 with sliding board. Slept well through the night. Family ok to transfer. Continent of JUAN RIVERA 11/23. Wound vac in place and it is draining well. Denies pain at this time. Nursing staff will continue with POC.

## 2023-11-25 NOTE — PLAN OF CARE
Goal Outcome Evaluation:      Plan of Care Reviewed With: patient    Overall Patient Progress: improvingOverall Patient Progress: improving    Alert and oriented x 4, c/o headache this morning, tylenol given with good effect. C/o abd discomfort, requested tums which was given with good effect. Prevana wound vac intact, offered to do abd incision dressing change but he declined. Wife at bedside, will continue Poc

## 2023-11-26 ENCOUNTER — APPOINTMENT (OUTPATIENT)
Dept: SPEECH THERAPY | Facility: CLINIC | Age: 68
DRG: 559 | End: 2023-11-26
Attending: PHYSICAL MEDICINE & REHABILITATION
Payer: COMMERCIAL

## 2023-11-26 ENCOUNTER — APPOINTMENT (OUTPATIENT)
Dept: PHYSICAL THERAPY | Facility: CLINIC | Age: 68
DRG: 559 | End: 2023-11-26
Attending: PHYSICAL MEDICINE & REHABILITATION
Payer: COMMERCIAL

## 2023-11-26 ENCOUNTER — APPOINTMENT (OUTPATIENT)
Dept: OCCUPATIONAL THERAPY | Facility: CLINIC | Age: 68
DRG: 559 | End: 2023-11-26
Attending: PHYSICAL MEDICINE & REHABILITATION
Payer: COMMERCIAL

## 2023-11-26 LAB
HOLD SPECIMEN: NORMAL
MAGNESIUM SERPL-MCNC: 1.9 MG/DL (ref 1.7–2.3)
PHOSPHATE SERPL-MCNC: 4.9 MG/DL (ref 2.5–4.5)
POTASSIUM SERPL-SCNC: 4.3 MMOL/L (ref 3.4–5.3)

## 2023-11-26 PROCEDURE — 128N000003 HC R&B REHAB

## 2023-11-26 PROCEDURE — 84100 ASSAY OF PHOSPHORUS: CPT | Performed by: PHYSICAL MEDICINE & REHABILITATION

## 2023-11-26 PROCEDURE — 97530 THERAPEUTIC ACTIVITIES: CPT | Mod: GO

## 2023-11-26 PROCEDURE — 97150 GROUP THERAPEUTIC PROCEDURES: CPT | Mod: GP

## 2023-11-26 PROCEDURE — 36415 COLL VENOUS BLD VENIPUNCTURE: CPT | Performed by: PHYSICAL MEDICINE & REHABILITATION

## 2023-11-26 PROCEDURE — 84132 ASSAY OF SERUM POTASSIUM: CPT | Performed by: PHYSICAL MEDICINE & REHABILITATION

## 2023-11-26 PROCEDURE — 250N000013 HC RX MED GY IP 250 OP 250 PS 637: Performed by: PHYSICIAN ASSISTANT

## 2023-11-26 PROCEDURE — 83735 ASSAY OF MAGNESIUM: CPT | Performed by: PHYSICAL MEDICINE & REHABILITATION

## 2023-11-26 PROCEDURE — 92526 ORAL FUNCTION THERAPY: CPT | Mod: GN

## 2023-11-26 PROCEDURE — 97535 SELF CARE MNGMENT TRAINING: CPT | Mod: GO

## 2023-11-26 PROCEDURE — 99231 SBSQ HOSP IP/OBS SF/LOW 25: CPT | Mod: GC | Performed by: STUDENT IN AN ORGANIZED HEALTH CARE EDUCATION/TRAINING PROGRAM

## 2023-11-26 PROCEDURE — 250N000011 HC RX IP 250 OP 636: Performed by: PHYSICIAN ASSISTANT

## 2023-11-26 RX ORDER — VITAMIN B COMPLEX
50 TABLET ORAL EVERY 24 HOURS
Status: DISCONTINUED | OUTPATIENT
Start: 2023-11-27 | End: 2023-12-01 | Stop reason: HOSPADM

## 2023-11-26 RX ORDER — PANTOPRAZOLE SODIUM 40 MG/1
40 TABLET, DELAYED RELEASE ORAL EVERY 24 HOURS
Status: DISCONTINUED | OUTPATIENT
Start: 2023-11-27 | End: 2023-12-01 | Stop reason: HOSPADM

## 2023-11-26 RX ADMIN — PANTOPRAZOLE SODIUM 40 MG: 40 TABLET, DELAYED RELEASE ORAL at 05:54

## 2023-11-26 RX ADMIN — ONDANSETRON 4 MG: 4 TABLET, ORALLY DISINTEGRATING ORAL at 19:18

## 2023-11-26 RX ADMIN — ACETAMINOPHEN 975 MG: 325 TABLET, FILM COATED ORAL at 09:04

## 2023-11-26 RX ADMIN — APIXABAN 5 MG: 5 TABLET, FILM COATED ORAL at 21:06

## 2023-11-26 RX ADMIN — ATORVASTATIN CALCIUM 10 MG: 10 TABLET, FILM COATED ORAL at 21:06

## 2023-11-26 RX ADMIN — METOPROLOL TARTRATE 100 MG: 50 TABLET, FILM COATED ORAL at 21:06

## 2023-11-26 RX ADMIN — AMLODIPINE BESYLATE 10 MG: 10 TABLET ORAL at 09:03

## 2023-11-26 RX ADMIN — PSYLLIUM HUSK 1 PACKET: 3.4 POWDER ORAL at 09:03

## 2023-11-26 RX ADMIN — APIXABAN 5 MG: 5 TABLET, FILM COATED ORAL at 09:03

## 2023-11-26 RX ADMIN — ACETAMINOPHEN 975 MG: 325 TABLET, FILM COATED ORAL at 13:17

## 2023-11-26 RX ADMIN — DOCUSATE SODIUM 50 MG: 50 CAPSULE, LIQUID FILLED ORAL at 15:34

## 2023-11-26 RX ADMIN — HYDROXYZINE HYDROCHLORIDE 25 MG: 25 TABLET, FILM COATED ORAL at 15:34

## 2023-11-26 RX ADMIN — METOPROLOL TARTRATE 100 MG: 50 TABLET, FILM COATED ORAL at 09:04

## 2023-11-26 RX ADMIN — Medication 1 MG: at 21:06

## 2023-11-26 RX ADMIN — QUETIAPINE FUMARATE 100 MG: 25 TABLET ORAL at 21:06

## 2023-11-26 RX ADMIN — Medication 60 ML: at 09:08

## 2023-11-26 RX ADMIN — ACETAMINOPHEN 975 MG: 325 TABLET, FILM COATED ORAL at 21:05

## 2023-11-26 RX ADMIN — SENNOSIDES 1 TABLET: 8.6 TABLET, FILM COATED ORAL at 09:03

## 2023-11-26 RX ADMIN — Medication 50 MCG: at 09:03

## 2023-11-26 ASSESSMENT — ACTIVITIES OF DAILY LIVING (ADL)
ADLS_ACUITY_SCORE: 35
ADLS_ACUITY_SCORE: 38
ADLS_ACUITY_SCORE: 35
ADLS_ACUITY_SCORE: 35
ADLS_ACUITY_SCORE: 36
ADLS_ACUITY_SCORE: 38
ADLS_ACUITY_SCORE: 38
ADLS_ACUITY_SCORE: 36
ADLS_ACUITY_SCORE: 38
ADLS_ACUITY_SCORE: 36

## 2023-11-26 NOTE — PROGRESS NOTES
Discharge Planner Post-Acute Rehab OT:      Discharge Plan: home with assist as needed and HH OT     Precautions: fall, abdominal, NWB of LLE, wound vac on LLE, moderate thick liquids     Current Status:  ADLs:  Mobility: Wc based, slide board Ax1, *wife cleared to assist bed<->w/c via slideboard, Nursing Assist 1-2 Via Slideboard, OT SB transfer bed to droparm padded commode w/ min A and cues for tech and commode to w/c w/ th placing sliding board and providing min  A and directives for technique  Grooming: set up seated  Dressing: UB: set up, LB min A in supine, mod A w/ use of reacher from commode after th provided instructions  Bathing: Ax2 using slideboard to rolling blue and white shower chair, min A for bathing seated  Toileting: slide board bed to commode or Wc to commode over the toilet with Ax1 in therapy, Ax2 with nursing, mod A for clothing management and total assist rear pericares  IADLs: Wife can assist  Vision/Cognition: baseline visual deficits as pt is legally blind, need to further assess cognition.     Assessment: Session focused on progressing IND with slide board transfers. Pt progressed to placing slide board and completing transfer from EoB to w/c with SBA. Educated pt on semicircular technique for wheelchair propulsion for shoulder protection and avoiding excess strain, as well as on pressure reliefs, pt completed x8 wheelchair push ups using BUEs and RLE, pt able to clear hips several inches off seat.     Other Barriers to Discharge (DME, Family Training, etc): Family training  [facility], drop arm commode, tub bench, pt needs a bedrail and slide board

## 2023-11-26 NOTE — PLAN OF CARE
Discharge Planner Post-Acute Rehab SLP:     Discharge Plan: home with assist,  SLP. ENT for further VF follow up     Precautions: fall, aspiration, sternal     Current Status:  Hearing: WFL  Vision: impaired; uses magnifying glass to read large print  Communication: aphonic. VF injection completed by ENT   Cognition: Mild cognitive impairment re: attention, memory, executive functions. Informal assessment completed as pt is limited by vision. Noting impaired cognition across other disiplines     Swallow: Regular solids, moderately thick (3) liquid. Set up assist meals, Fully upright all PO, small single sips/bites. Ok for ice chips via free water protocol (FWP)    Assessment:   Pt performed CTAR with effortful swallow x 4 sets of 5 reps. Pt performed effortful pitch glide x 5 sets of 5 reps. Pt able to achieve hoarse vocal quality at higher pitches.     SLP: Pt seen at noon meal with chicken quesadillas. Pt consumed 50% of meal with minimal difficulty with the hard edges of the quesadilla. Recommend upgrade to regular solids. VFSS pending this coming week to assess for advancement of liquids.     Other Barriers to Discharge (Family Training, etc): family training: diet pending progress, IADL support

## 2023-11-26 NOTE — PROGRESS NOTES
"  [facility] acute hospital   Acute Rehabilitation Unit  Daily progress note    INTERVAL HISTORY  Notes and labs reviewed over past 24 hours. No acute overnight events reported    Alfredo Burnham was seen and examined at bedside this morning reporting he had a good night sleep and feels well this morning. He reports he had a headache yesterday that quickly resolved with Tylenol. He denies any persistent or new headaches. His only complaint this morning is that the thickened liquids are making his mouth a little dry, but states he can tolerate it.     Spoke with SLP today, who is planning repeat video swallow study.     ROS: 10 point ROS was assessed and was negative unless otherwise stated in HPI.    MEDICATIONS   acetaminophen  975 mg Oral TID    amLODIPine  10 mg Oral Daily    apixaban ANTICOAGULANT  5 mg Oral BID    atorvastatin  10 mg Oral QPM    melatonin  1 mg Oral QPM    metoprolol tartrate  100 mg Oral BID    pantoprazole  40 mg Oral QAM AC    psyllium  1 packet Oral Daily    QUEtiapine  100 mg Oral or Feeding Tube At Bedtime    sennosides  1 tablet Oral BID    Vitamin D3  50 mcg Oral Daily    wound support modular  60 mL Oral Daily        acetaminophen, calcium carbonate, diphenhydrAMINE-zinc acetate, docusate sodium, hydrOXYzine, menthol (Topical Analgesic) 2.5%, naloxone **OR** naloxone **OR** naloxone **OR** naloxone, ondansetron, oxyCODONE, - MEDICATION INSTRUCTIONS -, polyethylene glycol, QUEtiapine     PHYSICAL EXAM  /78 (BP Location: Right arm, Patient Position: Supine, Cuff Size: Adult Regular)   Pulse 100   Temp 98.8  F (37.1  C) (Oral)   Resp 16   Ht 1.702 m (5' 7\")   Wt 58.1 kg (128 lb 1.4 oz)   SpO2 98%   BMI 20.06 kg/m    Gen: NAD, lying in bed  HEENT: NC/AT, MMM  Cardio: RRR, no murmurs rubs or gallops  Pulm: non-labored on room air  Abd: soft, non-tender, non-distended. Abdominal incision c/d/i  Ext: no edema in RLE, LLE with sock and wound vac in " place, No noticeable edema surrounding sock, no visible drainage.  Neuro/MSK: awake, alert, hypophonia    LABS  CBC RESULTS:   Recent Labs   Lab Test 11/23/23  1126 11/20/23  0617 11/17/23  0557   WBC 8.9 7.1 7.3   RBC 2.66* 2.55* 2.50*   HGB 8.1* 7.8* 7.7*   HCT 25.5* 24.4* 24.4*   MCV 96 96 98   MCH 30.5 30.6 30.8   MCHC 31.8 32.0 31.6   RDW 15.5* 15.7* 16.2*    142* 107*       Last Basic Metabolic Panel:  Recent Labs   Lab Test 11/26/23  0826 11/25/23  0840 11/23/23  1126 11/21/23  0749 11/20/23  0617 11/17/23  0557   NA  --   --  135  --  133* 130*   POTASSIUM 4.3 4.5 4.9  --  4.6 4.8   CHLORIDE  --   --  101  --  100 100   CO2  --   --  23  --  25 20*   ANIONGAP  --   --  11  --  8 10   GLC  --   --  120* 104* 102* 118*   BUN  --   --  56.7*  --  58.7* 58.2*   CR  --   --  2.01*  --  1.96* 1.97*   GFRESTIMATED  --   --  35*  --  36* 36*   OMAR  --   --  9.8  --  9.4 9.5       Rehabilitation - continue comprehensive acute inpatient rehabilitation program with multidisciplinary approach including therapies, rehab nursing, and physiatry following. See interval history for updates.      ASSESSMENT AND PLAN  Alfredo Burnham is a 68 year old  right hand dominant male with a past medical history of hypertension, TIA, 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, thrombocytopenia, and tobacco use disorder who was admitted on 9/24/23 with ruptured pararenal abdominal aortic aneurysm s/p open repair complicated by shock and colonic ischemic requiring multiple returns to OR for exploratory laparotomy, washout, and delayed closure with hospital course complicated by LLE ischemia ultimately 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, hematochezia with concern for possible bowel ischemia (no intramural ischemia on ex lap), MAYA requiring CRRT/HD (now off), acute hypoxic respiratory failure requiring 2 separate intubations, PE/DVT, aspiration event, bilateral pleural effusions, acute blood loss anemia/thrombocytopenia (requiring multiple  transfusions), multifocal embolic strokes, left psoas muscle and iliac hematomas, peripancreatic fluid collection and concern for possible pancreatitis, left vocal fold paralysis s/p injection 11/1, fevers, volume overload, delirium, tachycardia, exanthematous drug eruption/rash, malnutrition, dysphagia, hyperglycemia, loose stools, and multiple additional electrolyte abnormalities.  He is now admitted to ARU on 11/17/23 for multidisciplinary rehabilitation and ongoing medical management.        --Vitals stable. No lab today.  --Patient to have repeat video swallow study with SLP  --Continue ongoing medical management.  --Continue therapies and plan of care.      Seen and discussed with Dr. Yuen, PM&R staff physician       Indra Guerrero,   PGY2  Physical Medicine and Rehabilitation-[facility]  Pager: [phone]

## 2023-11-26 NOTE — PLAN OF CARE
Nursing Plan of Care Update     Plan of Care Reviewed With: patient     Overall Patient Progress: improving     FOCUS/GOAL  Bowel management, Bladder management, Nutrition/Feeding/Swallowing precautions, Medication management, Pain management, Wound care management, Medical management, Discharge planning, Mobility, Skin integrity, Cognition/Memory/Judgment/Problem solving, Equipment/Assistive devices, Reinforcement of self-care/ADL, Carryover of rehab techniques, Psychosocial needs, Safety management, Prevention of secondary complications, Discharge and Relevant Education, Unit Routine Orientation and Adjustment to lifestyle change     ASSESSMENT, INTERVENTIONS AND CONTINUING PLAN FOR GOAL:     Alert and oriented to person, place, time and situation.      Patient is eager to be able to consume liquids at normally consistency. Currently on Regular solids, moderately thick (3) liquid. Set up assist meals, Fully upright all PO, small single sips/bites. Ok for ice chips via free water protocol (FWP).      Daily wound care completed on all sites after patient showered at 1600: abdominal incision, right heel and left leg incision. Abdominal incision and amputation site water-proofed for shower. Erythema on right heel covered with new protective Mepilex dressing. RN reviewed with patient pressure points, importance of repositioning and protecting skin integrity. Patient responsive. Lotion applied after shower. Wound vac to left leg amputation site clean, dry, intact with green light on. ACE wrap intact tightly. Vascular to assess patient and dressing 11/27 according to order. Per wound care order, amputation dressing left intact at all times.      Mobility is WC based, slide board Ax1. Patient self-transferred with therapy at start of shift. Wife is cleared to assist bed per therapy <-> w/c via slideboard, Nursing Assist 1-2 Via Slideboard.      Minimal pain reported, located at amputation site. Patient took scheduled  Tylenol today and Atarax x1 for adjuvant pain. Nausea reported later in evening and resolved with single dose of PRN Ondansetron.      Patients wife at bedside earlier in evening helping and supportive. Scheduled Metoprolol given to patient after BP reading of 144/80 at 2000. Patient appeared to be asleep in bed at end of shift with all lights off. Bed alarm on.        Patient Vitals for the past 24 hrs:   BP Temp Temp src Pulse Resp SpO2   11/26/23 2105 134/86 -- -- 99 -- --   11/26/23 1920 127/78 98.6  F (37  C) Oral 98 18 --   11/26/23 0901 127/78 98.8  F (37.1  C) Oral 100 16 98 %     Handoff given to oncoming RN. VS stable and trended. No signs/symptoms of hypoglycemia. No glucose monitoring. Call light within reach at all times. Can make needs known. Continue with current plan of care.

## 2023-11-26 NOTE — PLAN OF CARE
Pt attended Falls Prevention class today with group of 5 patients. Pt selected for class due to documented gait deficit and falls risk. Class includes education in falls risks, how to decrease that risk through behavior and home modifications and energy conservation; and instruction in available equipment designed to increase home safety. Pt was able to verbalize understanding of materials and participated appropriately in the discussion and problem-solving segments of the class.   Pt was instructed in strategies, equipment, and environmental concepts to prevent falls, and will be quizzed later in order to demonstrate comprehension of material.     Marcos Sheets, PT  11/26/2023

## 2023-11-26 NOTE — PLAN OF CARE
Goal Outcome Evaluation:    Overall Patient Progress: no change    Outcome Evaluation: No change in pt progress this shift    Pt is A/O x4. No impulsive behavior overnight, able to make needs known. Denies pain, SOB, or chest pain. Mixed continence of bladder, uses urinal independently with staff assistance to empty. Continent of bowel, LBM 11/23. Transfers A1 slide board, w/c based. L-AKA incision has Prevena wound vac running continuously. Pt appeared asleep during safety rounds. Call light in reach, bed alarm on. Continue plan of care.

## 2023-11-26 NOTE — PLAN OF CARE
Goal Outcome Evaluation:      Plan of Care Reviewed With: patient    Overall Patient Progress: improvingOverall Patient Progress: improving  Pt alert and oriented x4, able to make needs known. VSS. Denies pain and SOB during the shift. Diet upgraded to combined regular diet/ moderately thickened liquid. Take pill whole with thickened liquid. Ax1 for transfer with sliding board to wheelchair. Dressing to the stump is intact, wound vac on, with minimal drainage. Abdominal incision dressing CDI.

## 2023-11-27 ENCOUNTER — APPOINTMENT (OUTPATIENT)
Dept: SPEECH THERAPY | Facility: CLINIC | Age: 68
DRG: 559 | End: 2023-11-27
Attending: PHYSICAL MEDICINE & REHABILITATION
Payer: COMMERCIAL

## 2023-11-27 ENCOUNTER — APPOINTMENT (OUTPATIENT)
Dept: OCCUPATIONAL THERAPY | Facility: CLINIC | Age: 68
DRG: 559 | End: 2023-11-27
Attending: PHYSICAL MEDICINE & REHABILITATION
Payer: COMMERCIAL

## 2023-11-27 ENCOUNTER — APPOINTMENT (OUTPATIENT)
Dept: PHYSICAL THERAPY | Facility: CLINIC | Age: 68
DRG: 559 | End: 2023-11-27
Attending: PHYSICAL MEDICINE & REHABILITATION
Payer: COMMERCIAL

## 2023-11-27 LAB
ANION GAP SERPL CALCULATED.3IONS-SCNC: 9 MMOL/L (ref 7–15)
BUN SERPL-MCNC: 54.3 MG/DL (ref 8–23)
CALCIUM SERPL-MCNC: 9.5 MG/DL (ref 8.8–10.2)
CHLORIDE SERPL-SCNC: 102 MMOL/L (ref 98–107)
CREAT SERPL-MCNC: 2.02 MG/DL (ref 0.67–1.17)
DEPRECATED HCO3 PLAS-SCNC: 22 MMOL/L (ref 22–29)
EGFRCR SERPLBLD CKD-EPI 2021: 35 ML/MIN/1.73M2
ERYTHROCYTE [DISTWIDTH] IN BLOOD BY AUTOMATED COUNT: 15.6 % (ref 10–15)
GLUCOSE SERPL-MCNC: 116 MG/DL (ref 70–99)
HCT VFR BLD AUTO: 24.7 % (ref 40–53)
HGB BLD-MCNC: 8 G/DL (ref 13.3–17.7)
MAGNESIUM SERPL-MCNC: 1.9 MG/DL (ref 1.7–2.3)
MCH RBC QN AUTO: 31 PG (ref 26.5–33)
MCHC RBC AUTO-ENTMCNC: 32.4 G/DL (ref 31.5–36.5)
MCV RBC AUTO: 96 FL (ref 78–100)
PHOSPHATE SERPL-MCNC: 5 MG/DL (ref 2.5–4.5)
PLATELET # BLD AUTO: 143 10E3/UL (ref 150–450)
POTASSIUM SERPL-SCNC: 4.3 MMOL/L (ref 3.4–5.3)
RBC # BLD AUTO: 2.58 10E6/UL (ref 4.4–5.9)
SODIUM SERPL-SCNC: 133 MMOL/L (ref 135–145)
WBC # BLD AUTO: 8.9 10E3/UL (ref 4–11)

## 2023-11-27 PROCEDURE — 80048 BASIC METABOLIC PNL TOTAL CA: CPT | Performed by: PHYSICIAN ASSISTANT

## 2023-11-27 PROCEDURE — 97530 THERAPEUTIC ACTIVITIES: CPT | Mod: GP | Performed by: REHABILITATION PRACTITIONER

## 2023-11-27 PROCEDURE — 250N000013 HC RX MED GY IP 250 OP 250 PS 637

## 2023-11-27 PROCEDURE — 92526 ORAL FUNCTION THERAPY: CPT | Mod: GN

## 2023-11-27 PROCEDURE — 250N000013 HC RX MED GY IP 250 OP 250 PS 637: Performed by: PHYSICIAN ASSISTANT

## 2023-11-27 PROCEDURE — 36415 COLL VENOUS BLD VENIPUNCTURE: CPT | Performed by: PHYSICIAN ASSISTANT

## 2023-11-27 PROCEDURE — 85027 COMPLETE CBC AUTOMATED: CPT | Performed by: PHYSICIAN ASSISTANT

## 2023-11-27 PROCEDURE — 83735 ASSAY OF MAGNESIUM: CPT | Performed by: PHYSICIAN ASSISTANT

## 2023-11-27 PROCEDURE — 128N000003 HC R&B REHAB

## 2023-11-27 PROCEDURE — 84100 ASSAY OF PHOSPHORUS: CPT | Performed by: PHYSICIAN ASSISTANT

## 2023-11-27 PROCEDURE — 99232 SBSQ HOSP IP/OBS MODERATE 35: CPT | Mod: FS | Performed by: PHYSICIAN ASSISTANT

## 2023-11-27 PROCEDURE — 97129 THER IVNTJ 1ST 15 MIN: CPT | Mod: GN

## 2023-11-27 PROCEDURE — 97535 SELF CARE MNGMENT TRAINING: CPT | Mod: GO | Performed by: STUDENT IN AN ORGANIZED HEALTH CARE EDUCATION/TRAINING PROGRAM

## 2023-11-27 RX ORDER — BISACODYL 10 MG
10 SUPPOSITORY, RECTAL RECTAL ONCE
Status: COMPLETED | OUTPATIENT
Start: 2023-11-27 | End: 2023-11-27

## 2023-11-27 RX ORDER — LANOLIN ALCOHOL/MO/W.PET/CERES
3 CREAM (GRAM) TOPICAL EVERY EVENING
Status: DISCONTINUED | OUTPATIENT
Start: 2023-11-27 | End: 2023-12-01 | Stop reason: HOSPADM

## 2023-11-27 RX ORDER — QUETIAPINE FUMARATE 25 MG/1
75 TABLET, FILM COATED ORAL AT BEDTIME
Status: DISCONTINUED | OUTPATIENT
Start: 2023-11-27 | End: 2023-12-01 | Stop reason: HOSPADM

## 2023-11-27 RX ADMIN — BISACODYL 10 MG: 10 SUPPOSITORY RECTAL at 18:53

## 2023-11-27 RX ADMIN — Medication 50 MCG: at 13:43

## 2023-11-27 RX ADMIN — APIXABAN 5 MG: 5 TABLET, FILM COATED ORAL at 08:09

## 2023-11-27 RX ADMIN — METOPROLOL TARTRATE 100 MG: 50 TABLET, FILM COATED ORAL at 08:09

## 2023-11-27 RX ADMIN — ATORVASTATIN CALCIUM 10 MG: 10 TABLET, FILM COATED ORAL at 20:05

## 2023-11-27 RX ADMIN — AMLODIPINE BESYLATE 10 MG: 10 TABLET ORAL at 08:09

## 2023-11-27 RX ADMIN — SENNOSIDES 1 TABLET: 8.6 TABLET, FILM COATED ORAL at 08:09

## 2023-11-27 RX ADMIN — METOPROLOL TARTRATE 100 MG: 50 TABLET, FILM COATED ORAL at 20:04

## 2023-11-27 RX ADMIN — PANTOPRAZOLE SODIUM 40 MG: 40 TABLET, DELAYED RELEASE ORAL at 16:34

## 2023-11-27 RX ADMIN — Medication 3 MG: at 18:53

## 2023-11-27 RX ADMIN — ACETAMINOPHEN 975 MG: 325 TABLET, FILM COATED ORAL at 20:05

## 2023-11-27 RX ADMIN — OXYCODONE HYDROCHLORIDE 5 MG: 5 TABLET ORAL at 18:53

## 2023-11-27 RX ADMIN — ACETAMINOPHEN 975 MG: 325 TABLET, FILM COATED ORAL at 08:09

## 2023-11-27 RX ADMIN — SENNOSIDES 1 TABLET: 8.6 TABLET, FILM COATED ORAL at 20:04

## 2023-11-27 RX ADMIN — ACETAMINOPHEN 975 MG: 325 TABLET, FILM COATED ORAL at 13:43

## 2023-11-27 RX ADMIN — APIXABAN 5 MG: 5 TABLET, FILM COATED ORAL at 20:04

## 2023-11-27 RX ADMIN — QUETIAPINE 75 MG: 25 TABLET ORAL at 20:04

## 2023-11-27 RX ADMIN — PSYLLIUM HUSK 1 PACKET: 3.4 POWDER ORAL at 08:08

## 2023-11-27 ASSESSMENT — ACTIVITIES OF DAILY LIVING (ADL)
ADLS_ACUITY_SCORE: 35

## 2023-11-27 NOTE — PROGRESS NOTES
"  [facility]   Acute Rehabilitation Unit  Daily progress note    INTERVAL HISTORY  Alfredo Burnham was seen and examined at bedside this morning, with spouse present.  No acute events reported overnight.  However, patient reports that he was not feeling very good last night.  Nausea started around dinnertime; he was not able to eat any dinner as a result and this persisted through the night.  He did get some relief with Zofran this morning.  Notes he has had nausea on and off throughout his admission.  Denies any abdominal pain.  Was able to eat this morning and no recurrent nausea.  Has not had BM for several days (11/23).  Is passing gas and feels he will have BM soon.  He feels \"dry\" but is trying his best to drink thickened liquids.  Has some ongoing pain in his leg, but overall feels this is well-controlled.  He denies back pain, dysuria, chest pain, shortness of breath, dizziness or lightheadedness.  Still not having great sleep, some trouble with both initiation and maintenance.  Reviewed several questions from patient and spouse regarding activity restrictions, surgical follow-up, ongoing therapies, discharge planning.  Patient is hopeful that no further extension of his discharge date.  Feels home will be better psychologically and finds therapy intensity here challenging.    With therapies, he is currently performing slide board transfers with Ax1, grooming seated with set-up assist, upper body dressing with set-up assist, and lower body dressing with min to mod A.    MEDICATIONS   acetaminophen  975 mg Oral TID    amLODIPine  10 mg Oral Daily    apixaban ANTICOAGULANT  5 mg Oral BID    atorvastatin  10 mg Oral QPM    melatonin  1 mg Oral QPM    metoprolol tartrate  100 mg Oral BID    pantoprazole  40 mg Oral Q24H    psyllium  1 packet Oral Daily    QUEtiapine  100 mg Oral or Feeding Tube At Bedtime    sennosides  1 tablet Oral BID    Vitamin D3  50 mcg Oral Q24H " "   wound support modular  60 mL Oral Daily        acetaminophen, calcium carbonate, diphenhydrAMINE-zinc acetate, docusate sodium, hydrOXYzine, menthol (Topical Analgesic) 2.5%, naloxone **OR** naloxone **OR** naloxone **OR** naloxone, ondansetron, oxyCODONE, - MEDICATION INSTRUCTIONS -, polyethylene glycol, QUEtiapine     PHYSICAL EXAM  /80 (BP Location: Left arm, Patient Position: Chair, Cuff Size: Adult Regular)   Pulse 92   Temp 98  F (36.7  C) (Oral)   Resp 18   Ht 1.702 m (5' 7\")   Wt 58.1 kg (128 lb 1.4 oz)   SpO2 98%   BMI 20.06 kg/m    Gen: NAD, lying in bed  HEENT: NC/AT, MMM  Cardio: RRR, no murmurs  Pulm: non-labored on room air, lungs CTA bilaterally  Abd: soft, mild tenderness in RLQ but no rebound or guarding, non-distended, bowel sounds active  Ext: no edema in RLE, LLE with sock in place, incision not visualized but small bloody drainage to vac  Neuro/MSK: awake, alert, right facial droop, anisocoria (L>R), hypophonia/dysphonia    LABS  CBC RESULTS:   Recent Labs   Lab Test 11/27/23  0617 11/23/23  1126 11/20/23  0617   WBC 8.9 8.9 7.1   RBC 2.58* 2.66* 2.55*   HGB 8.0* 8.1* 7.8*   HCT 24.7* 25.5* 24.4*   MCV 96 96 96   MCH 31.0 30.5 30.6   MCHC 32.4 31.8 32.0   RDW 15.6* 15.5* 15.7*   * 150 142*       Last Basic Metabolic Panel:  Recent Labs   Lab Test 11/27/23  0617 11/26/23  0826 11/25/23  0840 11/23/23  1126 11/21/23  0749 11/20/23  0617   *  --   --  135  --  133*   POTASSIUM 4.3 4.3 4.5 4.9  --  4.6   CHLORIDE 102  --   --  101  --  100   CO2 22  --   --  23  --  25   ANIONGAP 9  --   --  11  --  8   *  --   --  120* 104* 102*   BUN 54.3*  --   --  56.7*  --  58.7*   CR 2.02*  --   --  2.01*  --  1.96*   GFRESTIMATED 35*  --   --  35*  --  36*   OMAR 9.5  --   --  9.8  --  9.4       Rehabilitation - continue comprehensive acute inpatient rehabilitation program with multidisciplinary approach including therapies, rehab nursing, and physiatry following. See " interval history for updates.      ASSESSMENT AND PLAN  Alfredo Burnham is a 68 year old  right hand dominant male with a past medical history of hypertension, TIA, 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, thrombocytopenia, and tobacco use disorder who was admitted on 9/24/23 with ruptured pararenal abdominal aortic aneurysm s/p open repair complicated by shock and colonic ischemic requiring multiple returns to OR for exploratory laparotomy, washout, and delayed closure with hospital course complicated by LLE ischemia ultimately 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, hematochezia with concern for possible bowel ischemia (no intramural ischemia on ex lap), MAYA requiring CRRT/HD (now off), acute hypoxic respiratory failure requiring 2 separate intubations, PE/DVT, aspiration event, bilateral pleural effusions, acute blood loss anemia/thrombocytopenia (requiring multiple transfusions), multifocal embolic strokes, left psoas muscle and iliac hematomas, peripancreatic fluid collection and concern for possible pancreatitis, left vocal fold paralysis s/p injection 11/1, fevers, volume overload, delirium, tachycardia, exanthematous drug eruption/rash, malnutrition, dysphagia, hyperglycemia, loose stools, and multiple additional electrolyte abnormalities.  He is now admitted to ARU on 11/17/23 for multidisciplinary rehabilitation and ongoing medical management.        Admission to acute inpatient rehab 11/17/23.    Impairment group code: Amputation 05.3 Unilateral LE AKA - s/p left above knee amputation in setting of critical limb ischemia following contained AAA rupture s/p open AAA repair c/b strokes         PT, OT and SLP 60 minutes of each on a daily basis, in addition to rehab nursing and close management of physiatrist.       Impairment of ADL's: Noted to have  weakness, impaired balance,  baseline visual impairments (low vision), fatigue, impaired weight shifting, deconditioning, pain, new sternal and abdominal precautions, and LLE  NWB status in setting of LLE AKA, all affecting his ability to safely and independently perform basic ADLs.  Goal for CGA or less with basic ADLs.     Impairment of mobility:  Noted to have  weakness, impaired balance,  baseline visual impairments (low vision), fatigue, impaired weight shifting, deconditioning, pain, new sternal and abdominal precautions, and LLE NWB status in setting of LLE AKA, all affecting his ability to safely and independently perform basic mobility.  Goal for CGA or less with basic transfers and likely mixed mobility vs wheelchair based.     Impairment of cognition/language/swallow:  Noted to have moderate to severe pharyngeal dysphagia and dysphonia with goals for safe tolerance of least restrictive diet and improved cognitive-linguistic skills to meet basic needs.     Medical Conditions  New actions/orders/updates for today are in blue.     Acute critical LLE ischemia s/p L AKA on 10/17/23  Hematoma in LLE residual limb (10/25/23 CTA)  In setting of ruptured AAA as below  - Prevena vac in place at discharge to ARU.  However, vac was removed by vascular surgery on 11/18 due to concerns for bleeding/small hematoma and not conducive to hemostasis.  Assessed by vascular surgery 11/20, Prevena wound vac was replaced.  Appreciate assistance.  Plan to leave intact for 7 days, to be managed by vascular surgery.  Contact vascular surgery if any questions, concerns, or new bleeding episodes.  Leave LLE AKA wrapped with ACE.    - Vascular surgery planning to remove every other staple on 11/30  - Continue Expedite daily for wound healing  - Vascular surgery will follow weekly at ARU  - [facility] LLE  - Pain management: Tylenol 975 mg TID scheduled + additional doses PRN, atarax 25 mg q6h PRN, oxycodone 5 mg q4h PRN, wean as able.  - Orthotist adjusted fit of FloTech limb protector 11/20 due to decreased swelling.  Appreciate assistance.  - Vascular surgery will follow 1-2x/week at ARU  - Continue PT/OT  -  Follow up with vascular surgery   - [facility], secondarily follow up with amputee clinic here     Ruptured pararenal AAA s/p open repair 9/24/23 c/b shock, colonic ischemia  S/p multiple exploratory laparotomy and washout (9/25, 9/26, 9/29), fascia closure 10/2 with delayed primary skin closure on 10/20  - Wound care: Apply iodeine/betadine, allow to dry.  Reapply clean dry dressing.  OK to shower, do not scrub/soak.  Every other staple removed by vascular surgery 11/16, remainder to be removed by vascular surgery on 11/30  - Activity restrictions: No lifting, pushing, or pulling greater than 10 pounds and no strenuous exercise for 4-6 weeks.  Given has been longer than this, will ask vascular surgery about ability to gradually advance.  - Abdominal binder on at all times  - Statin as below  - Pain management as above  - Vascular surgery will follow 1-2x/week at ARU  - Follow up with vascular surgery     Acute ischemic stroke of multiple vessel territory due to embolic stroke of undetermined source (ESUS)   Code stroke 10/6 for new R facial droop and anisocoria.  CT head was without acute stroke or bleed, CTA was without proximal large vessel occlusion but did reveal concern for pulmonary embolism and had a normal perfusion scan. He was not a candidate for thrombolysis based on being outside 4.5 hours conventional time window, multiple major surgeries, thrombocytopenia and concern for GI bleed. He was not a candidate for thrombectomy considering absence of proximal large vessel occlusion.  MRI with above findings.  - BP management as below  - Apixaban as below  - Continue atorvastatin 10 mg daily  - Follow up with stroke neurology in 4-6 weeks     PE (incidental finding on CTA head/neck 10/6/23 for stroke code)  DVT LLE (U/S 10/7/23)  Initially on Heparin drip, required multiple brief holds due to bleeding concerns as below.  Ultimately transitioned to DOAC on 11/15.  - Continue  "apixaban 5 mg BID  - Per discharge summary, \"[facility] will need [facility] for [facility] co-pays, otherwise his co-pays will be very expensive.\"   - Monitor respiratory status, continue supplemental oxygen PRN to maintain O2 sats >92%     Left iliac hematoma (10/13/23 CT)  Left psoas muscle hematoma (10/24/23 CTA)  Stable on repeat imaging.  Could contribute to weakness.  - Monitor Hgb as below and signs/symptoms of worsening bleeding     Anemia, multifactorial including acute blood loss, renal disease  Thrombocytopenia  Required multiple transfusions throughout course.  Hgb stable in 7-8s. Platelets 100-150s.  11/27: Hgb stable at 8.0; platelets stable at 143  - Monitor for evidence of recurrent bleeding  - Continue to trend CBC every M/Th     Tachycardia, suspect multifactorial including debility, pain, hydration status, PE  Hx hypertension  PTA on amlodipine 5 mg, benazepril 20 mg.  Held earlier this admission due to shock.  Restarted on amlodipine and added metoprolol due to persistent tachycardia.    - Continue amlodipine 10 mg daily  - Continue metoprolol tartrate 100 mg BID  - No ACE for now per vascular surgery  - Monitor BP/HR per unit routine     MAYA, improved  Renal emboli  Baseline Cr ~1.0.  CT with scattered areas of infarct related to emboli with AAA.  Cr rising after open AAA repair, peaked >5.  Started on CRRT 9/30, transitioned to HD on 10/5, last run on 10/27, HD line removed 11/2.  Cr most recently 1.9-2.1 range.  11/27: Cr stable at 2.02  - Avoid nephrotoxins as able  - Trend renal function every M/Th  - Follow up in CKD/post-MAYA clinic     Hyponatremia  Started around 11/9, has been in 132-134 range since then, but lower at 130 on 11/16-11/17.  11/27: Na stable at 133  - Trend BMP every M/Th  - Further work-up if persists     Left vocal fold paralysis/glottic insufficiency s/p laryngoscopy with injection laryngoplasty on 11/1  Dysphonia  Moderate-Severe Oropharyngeal Dysphagia   ENT consulted " 10/28, fiberoptic endoscopy completed.  S/p injection 11/1.  - Continue SLP  - Regular diet; moderately thick (level 3) liquids. Ok for free water protocol per SLP.  Per SLP, benefits from set-up assist and intermittent supervision during meals, needs to be fully upright.  Ok to leave thickened liquids within patient's reach if upright in order to promote improved hydration.  - Per SLP, plan for repeat VFSS tomorrow  - Follow up with ENT in 1 month (~12/1)     Delirium, multifactorial, improving  - Delirium precautions, sleep hygiene  - Some ongoing difficulty with sleep but would advocate to begin weaning seroquel.  Will decrease seroquel to 75 mg at HS and increase melatonin to 3 mg at 1900.  - Continue seroquel 25 mg BID PRN for agitation.     Adjustment disorder with depressed mood   Seen by psychology during acute hospital stay.  Not on medications.  - Team noting concerns for depressed mood.  Psychology consult placed, appreciate assistance.  See their note on 11/22 for details.  - Continue to monitor     Severe malnutrition in the context of acute illness/disease   Peripancreatic fluid collection on CT 10/13, concern possible pancreatitis, improved on repeat imaging  Previously on tube feeding, tube removed 11/13 per patient/family request. Juvencio counts with a lot of missing information, but reportedly improving intake.  Recent fecal elastase normalized, no ongoing need for Creon (concerns for possibly pancreatitis earlier this admission).   - Regular diet; moderately thick (level 3) liquid  - RD consulted, appreciate assistance  - Supplements per RD recs    Constipation  Nausea  No BM since 11/23.  On daily psyllium and senokot BID (though has not been taking PM dose).  Notes nausea on and off throughout admission.  Had overnight 11/26-11/27.  No abdominal pain, nausea resolved.  - Encourage to utilize senokot BID as prescribed  - Add scheduled docusate daily (has been using PRN)  - Continue psyllium daily  -  Plan for suppository this evening if no BM by then  - PRN Zofran for nausea  - If nausea persists despite resolution of constipation, can evaluate for alternative causes     Hx bilateral legal 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  - Noted       Adjustment to disability:  Clinical psychology to eval and treat if indicated  FEN: regular diet; moderately thick (level 3) liquids.  Fully upright for all PO intake.  Bowel: continent, constipation as above  Bladder: continent.  Encourage double voiding, optimize positioning.    DVT Prophylaxis: on apixaban as above  GI Prophylaxis: PPI  Code: full code  Disposition: goal for home  ELOS:  target 12/6/23  Follow up Appointments on Discharge: PCP in 1-2 weeks, vascular surgery, stroke neurology in 4-6 weeks, ENT (~12/1), nephrology, PM&R ([facility] or [facility])      I, Zofia Bray, spent a total of 40 minutes face-to-face or managing the care of Alfredo Burnham. Over 50% of my time on the unit was spent counseling the patient and coordinating care. See note for details.       Zofia Bray PA-C  Physical Medicine & Rehabilitation

## 2023-11-27 NOTE — PROGRESS NOTES
Discharge Planner Post-Acute Rehab OT:      Discharge Plan: home with assist as needed and HH OT     Precautions: fall, abdominal, NWB of LLE, wound vac on LLE, moderate thick liquids     Current Status:  ADLs:  Mobility: Wc based, slide board Ax1, *wife cleared to assist bed<->w/c via slideboard, Nursing Assist 1 Via Slideboard, OT SB transfer bed to droparm padded commode w/ min A and cues for tech and commode to w/c w/ th placing sliding board and providing min  A and directives for technique  Grooming: set up seated  Dressing: UB: set up, LB min A in supine, mod A w/ use of reacher from commode after th provided instructions  Bathing: [id number] slideboard to tub bench, min A for bathing seated  Toileting: slide board bed to commode or Wc to commode over the toilet with Ax1 in therapy, Ax2 with nursing, mod A for clothing management and total assist rear pericares  IADLs: Wife can assist  Vision/Cognition: baseline visual deficits as pt is legally blind, need to further assess cognition.     Assessment: Focus on improving IND with self cares and transfers. Pt still requires some level of set up and assist for LB cares and transfers. Simulating clothing management and filemon cares from commode. Encourage pot to participate as much as possible by leaning and positioning self.      Other Barriers to Discharge (DME, Family Training, etc): Family training  [facility] through the [facility] including WC, drop arm commode, tub bench, pt needs a bedrail and slide board

## 2023-11-27 NOTE — PLAN OF CARE
Discharge Planner Post-Acute Rehab PT:      Discharge Plan: Home with spouse (accessible apt). Home care PT.     Precautions: L AKA: NWB LLE, abdominal, wound vac LLE, moderate thick liquids, visual deficits     Current Status: *Spouse ok to assist slideboard bed<>w/c only.   Bed Mobility: Jenny rolling, sup<>sit up to modA trunk assist  Transfer: SBA and setup assist w/ slideboard. Ax1-2 w/ nsg. Wife trained only needing few V.c for .   Gait: Unable, unsafe  Stairs: Unable, unsafe  Balance: Fair dynamic sitting. Requires CGA for quasistatic standing in // bars     Assessment: Progressing with uneven slideboard transfers, only needing Autumn but still very effortful for pt. PT cont to progress functional endurance tasks for cont functional mob.      Other Barriers to Discharge (DME, Family Training, etc):   DME: [facility]uing through [facility] (order faxed on 11/21): commode, tub bench, gait belt, wheelchair, slide board, bedrail.     Family training: ongoing w/ spouse    Home measurements sheet provided 11/20.

## 2023-11-27 NOTE — PLAN OF CARE
FOCUS/GOAL  Medical management    ASSESSMENT, INTERVENTIONS AND CONTINUING PLAN FOR GOAL:  VSS. Denied pain. Continent of bladder. No concerns reported. Uriel wound vac in place.  Patient declined  expedite , wound support healing despite education. Electrolytes results on with slight abnormality. No new orders. Will continue with POC.  Goal Outcome Evaluation:      Plan of Care Reviewed With: patient    Overall Patient Progress: improvingOverall Patient Progress: improving    Outcome Evaluation: Patient is stable.

## 2023-11-27 NOTE — PLAN OF CARE
Goal Outcome Evaluation:    Overall Patient Progress: no change    Outcome Evaluation: No change in pt progress this shift    Pt is A/O x4. No impulsive behavior overnight, able to make needs known. Denies pain, shortness of breath, or chest pain. Mixed continence of bladder, uses urinal independently with staff assistance to empty. Continent of bowel, LBM 11/23. Transfers assist 1 with slide board to wheelchair. L-AKA incision has Prevena wound vac running continuously, serosanguinous drainage noted in tubing. Pt appeared asleep during safety rounds. Call light in reach, bed alarm on. Continue plan of care.

## 2023-11-27 NOTE — PLAN OF CARE
Discharge Planner Post-Acute Rehab SLP:     Discharge Plan: home with assist,  SLP. ENT for further VF follow up     Precautions: fall, aspiration, sternal     Current Status:  Hearing: WFL  Vision: impaired; uses magnifying glass to read large print  Communication: aphonic. VF injection completed by ENT   Cognition: Mild cognitive impairment re: attention, memory, executive functions. Informal assessment completed as pt is limited by vision. Noting impaired cognition across other disiplines     Swallow: Regular, moderately thick (3) liquid. Set up assist meals, Fully upright all PO, small single sips/bites. Ok for ice chips via free water protocol (FWP)    Assessment: pt engaged in verbal + written large font task of solving functional math ?s. Pt with impaired working memory. Able to generate plan to solve problems but difficulty with implementation and recall of steps to solve he previously reported. Required mod cues to aid recall of this to solve problems. Pt unsuccessfully initiating intent to utilize voice to text/command feature on device. Vocal output not loud enough to achieve success. Pt expressed increased insight into level of difficulty with cognitive tasks. SLP provided education re: rationale for difficulty and plan for therapy. Pt engaged in reps oropharyngeal exercises via CTAR + effortful swallow and Marlin. Pt required max cues to continue to initiate completion of reps but able to complete x10-15 each exercise. Pt rpeorted limited IND completion of oropharyngeal exercises outside of SLP. SLP provided re education on ongoing need for exercises and encouragement to use during meals. Education provided on plan for VFSS and procedure     Other Barriers to Discharge (Family Training, etc): family training: diet pending progress, IADL support

## 2023-11-28 ENCOUNTER — APPOINTMENT (OUTPATIENT)
Dept: SPEECH THERAPY | Facility: CLINIC | Age: 68
DRG: 559 | End: 2023-11-28
Attending: PHYSICAL MEDICINE & REHABILITATION
Payer: COMMERCIAL

## 2023-11-28 ENCOUNTER — APPOINTMENT (OUTPATIENT)
Dept: PHYSICAL THERAPY | Facility: CLINIC | Age: 68
DRG: 559 | End: 2023-11-28
Attending: PHYSICAL MEDICINE & REHABILITATION
Payer: COMMERCIAL

## 2023-11-28 ENCOUNTER — APPOINTMENT (OUTPATIENT)
Dept: OCCUPATIONAL THERAPY | Facility: CLINIC | Age: 68
DRG: 559 | End: 2023-11-28
Attending: PHYSICAL MEDICINE & REHABILITATION
Payer: COMMERCIAL

## 2023-11-28 ENCOUNTER — APPOINTMENT (OUTPATIENT)
Dept: GENERAL RADIOLOGY | Facility: CLINIC | Age: 68
End: 2023-11-28
Payer: COMMERCIAL

## 2023-11-28 PROCEDURE — 999N000150 HC STATISTIC PT MED CONFERENCE < 30 MIN

## 2023-11-28 PROCEDURE — 97535 SELF CARE MNGMENT TRAINING: CPT | Mod: GO | Performed by: STUDENT IN AN ORGANIZED HEALTH CARE EDUCATION/TRAINING PROGRAM

## 2023-11-28 PROCEDURE — 74230 X-RAY XM SWLNG FUNCJ C+: CPT

## 2023-11-28 PROCEDURE — 250N000013 HC RX MED GY IP 250 OP 250 PS 637: Performed by: PHYSICIAN ASSISTANT

## 2023-11-28 PROCEDURE — 999N000125 HC STATISTIC PATIENT MED CONFERENCE < 30 MIN: Performed by: STUDENT IN AN ORGANIZED HEALTH CARE EDUCATION/TRAINING PROGRAM

## 2023-11-28 PROCEDURE — 92611 MOTION FLUOROSCOPY/SWALLOW: CPT | Mod: GN

## 2023-11-28 PROCEDURE — 99232 SBSQ HOSP IP/OBS MODERATE 35: CPT | Mod: FS | Performed by: PHYSICIAN ASSISTANT

## 2023-11-28 PROCEDURE — 92526 ORAL FUNCTION THERAPY: CPT | Mod: GN

## 2023-11-28 PROCEDURE — 74230 X-RAY XM SWLNG FUNCJ C+: CPT | Mod: 26 | Performed by: STUDENT IN AN ORGANIZED HEALTH CARE EDUCATION/TRAINING PROGRAM

## 2023-11-28 PROCEDURE — 250N000013 HC RX MED GY IP 250 OP 250 PS 637

## 2023-11-28 PROCEDURE — 999N000125 HC STATISTIC PATIENT MED CONFERENCE < 30 MIN

## 2023-11-28 PROCEDURE — 128N000003 HC R&B REHAB

## 2023-11-28 PROCEDURE — 97530 THERAPEUTIC ACTIVITIES: CPT | Mod: GP

## 2023-11-28 RX ORDER — BARIUM SULFATE 400 MG/ML
SUSPENSION ORAL ONCE
Status: COMPLETED | OUTPATIENT
Start: 2023-11-28 | End: 2023-11-28

## 2023-11-28 RX ADMIN — ATORVASTATIN CALCIUM 10 MG: 10 TABLET, FILM COATED ORAL at 19:17

## 2023-11-28 RX ADMIN — SENNOSIDES 1 TABLET: 8.6 TABLET, FILM COATED ORAL at 19:24

## 2023-11-28 RX ADMIN — AMLODIPINE BESYLATE 10 MG: 10 TABLET ORAL at 08:10

## 2023-11-28 RX ADMIN — ACETAMINOPHEN 975 MG: 325 TABLET, FILM COATED ORAL at 08:09

## 2023-11-28 RX ADMIN — Medication 3 MG: at 19:17

## 2023-11-28 RX ADMIN — METOPROLOL TARTRATE 100 MG: 50 TABLET, FILM COATED ORAL at 08:10

## 2023-11-28 RX ADMIN — SENNOSIDES 1 TABLET: 8.6 TABLET, FILM COATED ORAL at 08:10

## 2023-11-28 RX ADMIN — BARIUM SULFATE 50 ML: 400 SUSPENSION ORAL at 15:30

## 2023-11-28 RX ADMIN — METOPROLOL TARTRATE 100 MG: 50 TABLET, FILM COATED ORAL at 19:17

## 2023-11-28 RX ADMIN — DOCUSATE SODIUM 50 MG: 50 CAPSULE, LIQUID FILLED ORAL at 08:09

## 2023-11-28 RX ADMIN — ACETAMINOPHEN 975 MG: 325 TABLET, FILM COATED ORAL at 13:40

## 2023-11-28 RX ADMIN — PANTOPRAZOLE SODIUM 40 MG: 40 TABLET, DELAYED RELEASE ORAL at 16:08

## 2023-11-28 RX ADMIN — APIXABAN 5 MG: 5 TABLET, FILM COATED ORAL at 08:10

## 2023-11-28 RX ADMIN — ACETAMINOPHEN 975 MG: 325 TABLET, FILM COATED ORAL at 19:17

## 2023-11-28 RX ADMIN — APIXABAN 5 MG: 5 TABLET, FILM COATED ORAL at 19:17

## 2023-11-28 RX ADMIN — Medication 50 MCG: at 13:39

## 2023-11-28 RX ADMIN — QUETIAPINE 75 MG: 25 TABLET ORAL at 19:17

## 2023-11-28 ASSESSMENT — ACTIVITIES OF DAILY LIVING (ADL)
ADLS_ACUITY_SCORE: 35
ADLS_ACUITY_SCORE: 35
ADLS_ACUITY_SCORE: 34
ADLS_ACUITY_SCORE: 35
ADLS_ACUITY_SCORE: 36
ADLS_ACUITY_SCORE: 35
ADLS_ACUITY_SCORE: 34

## 2023-11-28 NOTE — PROGRESS NOTES
CLINICAL NUTRITION SERVICES - REASSESSMENT NOTE     Nutrition Prescription    RECOMMENDATIONS FOR MDs/PROVIDERS TO ORDER:  - Appreciate encouragement surrounding PO intake  - Consider appetite stimulant (Remeron, Marinol) given continued poor intake and severe malnutrition    Malnutrition Status:    Severe malnutrition in the context of acute illness.     Recommendations already ordered by Registered Dietitian (RD):  - Discontinued Expedite cup  - Discontinued Gelatein  - Continue Magic Cup BID  - Ensure Enlive with lunch  - Additional snacks/supplements PRN    Future/Additional Recommendations:  Monitor PO intake, supplement use, labs, and weight trends     EVALUATION OF THE PROGRESS TOWARD GOALS   Diet: Regular and Moderately Thick  Thickened Liquids   - Room service with assist    Snacks/supplements:  - Expedite cup with breakfast  - Magic Cup (chocolate) with breakfast and dinner  - Gelatein Plus (pineapple) with lunch  - Additional snacks/supplements PRN    Intake: 0-50% per flowsheets over past week       NEW FINDINGS   - Per discussion with RN, pt has excess stock of Expedite cup in room and he does not want to take it anymore. RD discontinued order  - VFSS today at 3 pm. Diet advanced from easy to chew (level 7) to Regular on 11/27. Pt remains on moderately thick liquids.     Met with pt and spouse in room. Pt reports appetite is pretty good, eating about 50% of meals. Pt had lunch tray in room and had eaten 3 mini corn dogs. He does not like the Expedite Cup - RD canceled order. He has not been consuming the Gelatein either. He will use the Magic Cup. Spouse reports that pt uses Boost at home. Discussed RD can send Ensure, similar to Boost. Pt willing to try it but doesn't think he likes Ensure. Discussed it will need to be thickened according to diet order. Pt understanding. Continued to encourage adequate intakes.     Weight:   Wt Readings from Last 15 Encounters:   11/24/23 58.1 kg (128 lb 1.4 oz)  "  11/17/23 64.6 kg (142 lb 6.7 oz)   02/08/11 75.8 kg (167 lb)   11/17/23 0854             64.6 kg (142 lb 6.7 oz)  11/13/23 0248             67.7 kg (149 lb 4 oz)  11/12/23 0500             68.8 kg (151 lb 10.8 oz)  11/10/23 0645             70.1 kg (154 lb 8.7 oz)  11/09/23 0207             70.4 kg (155 lb 3.3 oz)  11/08/23 0035             69 kg (152 lb 1.9 oz)  11/05/23 0628             68.4 kg (150 lb 12.7 oz)  11/04/23 0335             68.5 kg (151 lb 0.2 oz)  11/03/23 0628             70.2 kg (154 lb 12.2 oz)  10/30/23 0400             71.8 kg (158 lb 4.6 oz)  10/26/23 0000             72.9 kg (160 lb 11.5 oz)  10/23/23 0000             74.4 kg (164 lb 0.4 oz)  10/21/23 0000             76.1 kg (167 lb 12.3 oz)  10/20/23 0400             81 kg (178 lb 9.2 oz)  10/17/23 0025             79.8 kg (175 lb 14.8 oz) - AKA  10/16/23 0400             78.9 kg (173 lb 15.1 oz)  10/13/23 0200             76.7 kg (169 lb 1.5 oz)  10/12/23 0000             77.2 kg (170 lb 3.1 oz)  10/09/23 0500             79.8 kg (175 lb 14.8 oz)  10/04/23 0400             89.1 kg (196 lb 6.9 oz)  09/28/23 0000             95.4 kg (210 lb 5.1 oz)  09/26/23 2200             87.5 kg (192 lb 14.4 oz)  09/25/23 2000             83.2 kg (183 lb 6.8 oz)  09/25/23 0350             79.4 kg (175 lb 0.7 oz)  01/26/23 1300             73.4 kg (161 lb 11.3 oz)    Question accuracy of most recent wt. 19% wt loss in 1 month (expect ~10% wt loss after AKA).    Labs:   Na: 133 (L)  BUN: 54.3 (H)  Cr: 2.02 (H)  Phos: 5.0 (H, 11/27)  Hemoglobin A1C: 5.7 (9/25)  Vitamin D: 18 (L, 11/7)    Meds:  Colace  Protonix  Senokot  Vitamin D3  Expedite cup - last given 11/26    GI: Per PA note 11/28, \"Patient reports having 3 bowel movements since suppository after several days without\"    Skin: Edson 15    MALNUTRITION  % Intake: </= 50% for >/= 5 days (severe)  % Weight Loss: > 5% in 1 month (severe)  Subcutaneous Fat Loss: Facial region: moderate and Upper arm: " moderate   Muscle Loss: Temporal: moderate, Thoracic region (clavicle, acromium bone, deltoid, trapezius, pectoral): moderate, and Upper arm (bicep, tricep): moderate   Fluid Accumulation/Edema: Does not meet criteria - trace  Malnutrition Diagnosis: Severe malnutrition in the context of acute illness.     Previous Goals   Patient to consume % of nutritionally adequate meal trays TID, or the equivalent with supplements/snacks.    Evaluation: Not met    Previous Nutrition Diagnosis  Inadequate oral intake related to poor appetite and restrictive modified diet as evidenced by documented intakes, pt report.     Evaluation: Modified    CURRENT NUTRITION DIAGNOSIS  Inadequate oral intake related to poor appetite as evidenced by pt report, significant wt loss in 1 month, and moderate fat and muscle wasting.       INTERVENTIONS  Implementation  Collaboration with other providers - discussed in team rounds  Medical food supplement therapy - Magic Cup, Ensure    Goals  Patient to consume % of nutritionally adequate meal trays TID, or the equivalent with supplements/snacks.      Monitoring/Evaluation  Progress toward goals will be monitored and evaluated per protocol.     Michelle Sandhu RD, MICHAEL  [facility] RD pager: [phone]  Weekend/Holiday RD pager: [phone]

## 2023-11-28 NOTE — PLAN OF CARE
Goal Outcome Evaluation:    Overall Patient Progress: no change    Outcome Evaluation: No change in pt progress this shift    Pt is A/O x4. No impulsive behavior overnight, able to make needs known. Denies pain, shortness of breath, or chest pain. Mixed continence of bladder, uses urinal independently with staff assistance to empty. Continent of bowel, LBM 11/27. Transfers A1 with slide board to wheelchair. L-AKA incision has Prevena wound vac running continuously, serosanguinous drainage noted in tubing. Pt appeared asleep during safety rounds. Call light in reach, bed alarm on. Continue plan of care.

## 2023-11-28 NOTE — PLAN OF CARE
Discharge Planner Post-Acute Rehab SLP:     Discharge Plan: home with assist,  SLP. ENT for further VF follow up     Precautions: fall, aspiration, sternal     Current Status:  Hearing: WFL  Vision: impaired; uses magnifying glass to read large print  Communication: aphonic. VF injection completed by ENT   Cognition: Mild cognitive impairment re: attention, memory, executive functions. Informal assessment completed as pt is limited by vision. Noting impaired cognition across other disciplines     Swallow: Regular, moderately thick (3) liquid. Set up assist meals, Fully upright all PO, small single sips/bites. Ok for ice chips via free water protocol (FWP)    Assessment: Pt participated in orophayrngeal exercises via CTAR + effortful swallow and Marlin. Pt reported no IND completion of exercises outside of SLP. Pt able to complete x25 CTAR and x15 Marlin with min cues. Mostly requiring cues to continue to produce reps. Noting low motivation and endurance for exercise despite education and encouragement. Plan for VFSS this PM to objectively re assess swallow function     PM: Pt seen for repeat VFSS this date. Pt presents with notable improved and now more mild dysphagia c/b by mildly delayed swallow initiation, inconsistent superior hyoid excursion, instances of incomplete epiglottic inversion, and vallecular residue. Impairments resulting in few instances of flash penetration with thin (0) across study. Recommend pt resume current diet. SLP to follow pt for meal trial tomorrow and determine if further liquid advancement is warranted pending clinical presentation and safety. Do suspect pt could advance to 0 liquid if appropriate at bedside when approved by SLP    Other Barriers to Discharge (Family Training, etc): family training: diet pending progress, IADL support

## 2023-11-28 NOTE — PLAN OF CARE
fDischarge Planner Post-Acute Rehab PT:      Discharge Plan: Home with spouse (accessible apt). Home care PT.     Precautions: L AKA: NWB LLE, abdominal, wound vac LLE, moderate thick liquids, visual deficits     Current Status: *Spouse ok to assist slideboard bed<>w/c only.   Bed Mobility: Jenny rolling, sup<>sit up to modA trunk assist  Transfer: SBA and setup assist w/ slideboard. Ax1-2 w/ nsg.   Gait: Unable, unsafe  Stairs: Unable, unsafe  Balance: Fair dynamic sitting. Requires CGA for quasistatic standing in // bars     Assessment: Progressing w/ standing tolerance. [facility]ed [facility] via fax and also left VM for patient's [facility] advocate. If no confirmation of [facility] equipment order by 11/29, plan to pursue equipment outside of [facility] and family in agreement.      Other Barriers to Discharge (DME, Family Training, etc):   DME: Pursuing through [facility] (refaxed order on 11/27): commode, tub bench, gait belt, wheelchair, slide board, bedrail.     Family training: ongoing w/ spouse    Home measurements sheet provided 11/20.

## 2023-11-28 NOTE — PROGRESS NOTES
"   11/28/23 8213   Appointment Info   Signing Clinician's Name / Credentials (SLP) Marquita Russ MS CCC-SLP   General Information   Referring Physician Zofia Bray PA-C   Pertinent History of Current Problem per H&P:\"68 year old right hand dominant male with past medical history of hypertension, TIA, 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, thrombocytopenia, and tobacco use disorder who presented on 9/24/23 with severe abdominal pain radiating to back, found to have contained, ruptured abdominal aortic aneurysm.  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 \" SLP consulted to assess/tx dysphagia   General Observations pt seen for repeat VFSS this date   Pain Assessment   Patient Currently in Pain No   Type of Evaluation   Type of Evaluation Swallow Evaluation   General Swallowing Observations   Past History of Dysphagia most recent FEES completed 11/13:\"FEES evaluation completed per MD order. Flexible scope passed via the right nares to sit above the soft palate and obtain a superior view of the pharynx, larynx, and UES. Significant thick, sticky secretions observed throughout pharynx at onset of eval. swelling noted throughout pharynx and laryngeal vestibule and reduced movement of L VF noted, which is consistent with dysphonia. Pt presented with trials of ice chips, mildly-thick liquids, moderately-thick liquids and purees. pre-spillage and significant pharyngeal residuals noted with ice chips and mildly-thick liquid trials. Post swallow residuals noted in posterior commisure and were aspirated post swallow. Pt does not sense this, but is able to partially clear with cues. Similar results noted with moderately thick liquids with attempt to turn head to left (to see if this would " "improve VF closure). Improved pharyngeal clearance with moderately-thick liquids with head facing forward (without use of head turn strategy). Swallow functional for moderately thick liquids and purees; reduced pharyngeal residuals noted and no penetration/aspiration observed.\"   Respiratory Support room air   Current Diet/Method of Nutritional Intake (General Swallowing Observations, NIS) regular diet;moderately thick (honey-thick) liquids (level 3)   Swallowing Evaluation Videofluoroscopic swallow study (VFSS)   VFSS Evaluation   Radiologist LANCE JARVIS MD   Views Taken left lateral   Physical Location of Procedure [facility]   VFSS Textures Trialed thin liquids;mildly thick liquids;moderately thick liquids/liquidized;solid foods   VFSS Eval: Thin Liquid Texture Trial   Mode of Presentation, Thin Liquid self-fed;straw;cup   Order of Presentation 13-17, 20-21   Preparatory Phase WFL   Oral Phase, Thin Liquid residue in oral cavity;premature pharyngeal entry   Bolus Location When Swallow Triggered posterior laryngeal surface of epiglottis   Pharyngeal Phase, Thin Liquid impaired epiglottic movement;residue in vallecula;residue in pyriform sinus   Rosenbek's Penetration Aspiration Scale: Thin Liquid Trial Results 2 - contrast enters airway, remains above the vocal cords, no residue remains (penetration)   Diagnostic Statement mild flash penetration with thin (0) liquid intermittently across study and with onset of straw   VFSS Eval: Mildly Thick Liquids   Mode of Presentation self-fed;cup;straw   Order of Presentation 5-12   Preparatory Phase WFL   Oral Phase premature pharyngeal entry   Bolus Location When Swallow Triggered posterior laryngeal surface of epiglottis   Pharyngeal Phase impaired epiglottic movement;residue in vallecula;residue in pyriform sinus   Rosenbek's Penetration Aspiration Scale 1 - no aspiration, contrast does not enter airway   VFSS Eval: Moderately Thick Liquids   Mode of Presentation " self-fed;cup   Order of Presentation 4   Preparatory Phase WFL   Oral Phase residue in oral cavity;premature pharyngeal entry   Bolus Location When Swallow Triggered valleculae   Pharyngeal Phase residue in vallecula   Rosenbek's Penetration Aspiration Scale 1 - no aspiration, contrast does not enter airway   VFSS Evaluation: Solid Food Texture Trial   Mode of Presentation, Solid self-fed   Order of Presentation 19   Preparatory Phase prolonged bolus preparation   Oral Phase, Solid residue in oral cavity   Bolus Location When Swallow Triggered valleculae   Pharyngeal Phase, Solid residue in vallecula   Rosenbek's Penetration Aspiration Scale: Solid Food Trial Results 1 - no aspiration, contrast does not enter airway   Esophageal Phase of Swallow   Patient reports or presents with symptoms of esophageal dysphagia No   Esophageal sweep performed during today s vidofluoroscopic exam  No   Swallowing Recommendations   Diet Consistency Recommendations regular diet;moderately thick liquids/liquidized (level 3)   Supervision Level for Intake patient independent   Mode of Delivery Recommendations bolus size, small;slow rate of intake   Monitoring/Assistance Required (Eating/Swallowing) stop eating activities when fatigue is present;monitor for cough or change in vocal quality with intake   Recommended Feeding/Eating Techniques (Swallow Eval) maintain upright sitting position for eating;minimize distractions during oral intake;provide assist with feeding;set-up and prepare tray   Instrumental Assessment Recommendations instrumental evaluation not recommended at this time   Clinical Impression   Criteria for Skilled Therapeutic Interventions Met (SLP Eval) Yes, treatment indicated   SLP Diagnosis minimal oropharyngeal dysphagia   Risks & Benefits of therapy have been explained evaluation/treatment results reviewed;care plan/treatment goals reviewed;risks/benefits reviewed;current/potential barriers reviewed;participants  voiced agreement with care plan;participants included;patient   Clinical Impression Comments SLP: Pt seen for repeat VFSS this date. Pt presents with notable improved and now more mild dysphagia c/b by mildly delayed swallow initiation, inconsistent superior hyoid excursion, instances of incomplete epiglottic inversion, and vallecular residue. Impairments resulting in few instances of flash penetration with thin (0) across study. Recommend pt resume current diet. SLP to follow pt for meal trial tomorrow and determine if further liquid advancement is warranted pending clinical presentation and safety. Do suspect pt could advance to 0 liquid if appropriate at bedside when approved by SLP   SLP Total Evaluation Time   Evaluation, videofluoroscopic eval of swallow function Minutes (17648) 30   SLP Goals   Therapy Frequency (SLP Eval) daily   SLP Predicted Duration/Target Date for Goal Attainment 12/09/23   SLP Goals Swallow   SLP Discharge Planning   SLP Plan SLP: meal reg, trial 0 and 2 - advance as clinically appropriate. educate results VFSS. tx mod attention, memory, executive function (vision impaired - does well with large font).   Total Session Time   Total Session Time (sum of timed and untimed services) 30   SLP - Acute Rehab Center Time   Individual Time (minutes) - enter zero if not applicable - SLP 30   Group Time (minutes) - enter zero if not applicable  - SLP 0   Concurrent Time (minutes) - enter zero if not applicable  - SLP 0   Co-Treatment Time (minutes) - enter zero if not applicable  - SLP 0   ARC Total Session Time (minutes) - SLP 30   ARC Total Session Time   OT/PT/SLP ARC Total Session Time 30

## 2023-11-28 NOTE — PLAN OF CARE
FOCUS/GOAL  Medical management    ASSESSMENT, INTERVENTIONS AND CONTINUING PLAN FOR GOAL:  Patient declined MetaMucil this morning. Expedite not delivered, had cup on his tray, but put aside, stated will take it later. Another one was also at the bedside. Risk and benefit of the product discussed w/o positive effect. Denied pain this shift. Continent of bladder using urinal. No BM reported this shift. Diet upgraded to Regular texture, still on Mildly thick liquid. He is actually in radiology for swallow study. Uriel wound vac in good function draining dark sanguineous drainage. Vascular to come during the week for follow up per MD. Blanchable redness on L  residual limb. Provider informed. Keep monitoring. Will continue with POC.  Goal Outcome Evaluation:      Plan of Care Reviewed With: patient    Overall Patient Progress: improvingOverall Patient Progress: improving    Outcome Evaluation: Patient is stable.

## 2023-11-28 NOTE — PROGRESS NOTES
Discharge Planner Post-Acute Rehab OT:      Discharge Plan: home with assist as needed and HH OT     Precautions: fall, abdominal, NWB of LLE, wound vac on LLE, moderate thick liquids     Current Status:  ADLs:  Mobility: Wc based, slide board Ax1, *wife cleared to assist bed<->w/c via slideboard, Nursing Assist 1 Via Slideboard, OT SB transfer bed to droparm padded commode w/ min A and cues for tech and commode to w/c w/ th placing sliding board and providing min  A and directives for technique  Grooming: IND WC based  Dressing: UB: IND, LB min A in supine  Bathing: Bp1yhthy slideboard to tub bench, min A for bathing seated  Toileting: slide board bed to commode or Wc to commode over the toilet with Ax1 in therapy, Ax2 with nursing, mod A for clothing management and total assist rear pericares  IADLs: Wife can assist  Vision/Cognition: baseline visual deficits as pt is legally blind, need to further assess cognition.     Assessment: Continue to problem solve dressing in bed and management of wound vac. Pt has difficulty problem solving without needing specific direction during transfers and difficult tasks. Pt reports frequent urgent bowel movements last night with use of bedpan. Need to manage this better since bedpan is not an option for home.     Other Barriers to Discharge (DME, Family Training, etc): Family training  [facility], drop arm commode, tub bench, pt needs a bedrail and slide board

## 2023-11-28 NOTE — PROGRESS NOTES
[facility]   Acute Rehabilitation Unit  Daily progress note    INTERVAL HISTORY  Alfredo Burnham was seen and examined at bedside this morning during team rounds, with spouse present.  No acute events reported overnight.  Patient reports having 3 bowel movements since suppository after several days without.  Pain overall well-controlled.  Encouraged ongoing improved intake.  Team has noted some redness in left upper thigh medially, seems to be from wrapping.  PT assisted with obtaining different size wrap and rewrapping with improved fit.  Spouse noting some difficulty with managing clothing and urinal use, team will work with patient to problem-solve.    With therapies, he is making gradual progress.  [facility] but still [facility].  With ADLs, has been limited some by impaired problem-solving and fatigue.  Anticipate will need ADL assist, moreso for cuing than physical assist.  Has made some improvements with SLP, now on regular diet.  Repeat VFSS planned this afternoon given still on moderately thick liquids.  Suspect mild to moderate cognitive impairments, though some difficulty assessing formally due to impaired vision.  For full functional updates, see team rounds note from today.     MEDICATIONS   acetaminophen  975 mg Oral TID    amLODIPine  10 mg Oral Daily    apixaban ANTICOAGULANT  5 mg Oral BID    atorvastatin  10 mg Oral QPM    docusate sodium  50 mg Oral Daily    melatonin  3 mg Oral QPM    metoprolol tartrate  100 mg Oral BID    pantoprazole  40 mg Oral Q24H    psyllium  1 packet Oral Daily    QUEtiapine  75 mg Oral At Bedtime    sennosides  1 tablet Oral BID    Vitamin D3  50 mcg Oral Q24H    wound support modular  60 mL Oral Daily        acetaminophen, calcium carbonate, diphenhydrAMINE-zinc acetate, hydrOXYzine, menthol (Topical Analgesic) 2.5%, naloxone **OR** naloxone **OR** naloxone **OR** naloxone, ondansetron, oxyCODONE, - MEDICATION  "INSTRUCTIONS -, polyethylene glycol, QUEtiapine     PHYSICAL EXAM  /81 (BP Location: Right arm)   Pulse 99   Temp 97.9  F (36.6  C) (Oral)   Resp 16   Ht 1.702 m (5' 7\")   Wt 58.1 kg (128 lb 1.4 oz)   SpO2 96%   BMI 20.06 kg/m    Gen: NAD, up in chair  HEENT: NC/AT, MMM  Cardio: appears well-perfused  Pulm: non-labored on room air  Abd: soft, non-distended  Ext: no edema in RLE, LLE with ace in place, incision not visualized but small bloody drainage to vac  Neuro/MSK: awake, alert, right facial droop, anisocoria (L>R), hypophonia/dysphonia  *Full exam deferred today for conversation    LABS  CBC RESULTS:   Recent Labs   Lab Test 11/27/23  0617 11/23/23  1126 11/20/23  0617   WBC 8.9 8.9 7.1   RBC 2.58* 2.66* 2.55*   HGB 8.0* 8.1* 7.8*   HCT 24.7* 25.5* 24.4*   MCV 96 96 96   MCH 31.0 30.5 30.6   MCHC 32.4 31.8 32.0   RDW 15.6* 15.5* 15.7*   * 150 142*       Last Basic Metabolic Panel:  Recent Labs   Lab Test 11/27/23  0617 11/26/23  0826 11/25/23  0840 11/23/23  1126 11/21/23  0749 11/20/23  0617   *  --   --  135  --  133*   POTASSIUM 4.3 4.3 4.5 4.9  --  4.6   CHLORIDE 102  --   --  101  --  100   CO2 22  --   --  23  --  25   ANIONGAP 9  --   --  11  --  8   *  --   --  120* 104* 102*   BUN 54.3*  --   --  56.7*  --  58.7*   CR 2.02*  --   --  2.01*  --  1.96*   GFRESTIMATED 35*  --   --  35*  --  36*   OMAR 9.5  --   --  9.8  --  9.4       Rehabilitation - continue comprehensive acute inpatient rehabilitation program with multidisciplinary approach including therapies, rehab nursing, and physiatry following. See interval history for updates.      ASSESSMENT AND PLAN  Alfredo Burnham is a 68 year old  right hand dominant male with a past medical history of hypertension, TIA, 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, thrombocytopenia, and tobacco use disorder who was admitted on 9/24/23 with ruptured pararenal abdominal aortic aneurysm s/p open repair complicated by shock and " colonic ischemic requiring multiple returns to OR for exploratory laparotomy, washout, and delayed closure with hospital course complicated by LLE ischemia ultimately 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, hematochezia with concern for possible bowel ischemia (no intramural ischemia on ex lap), MAYA requiring CRRT/HD (now off), acute hypoxic respiratory failure requiring 2 separate intubations, PE/DVT, aspiration event, bilateral pleural effusions, acute blood loss anemia/thrombocytopenia (requiring multiple transfusions), multifocal embolic strokes, left psoas muscle and iliac hematomas, peripancreatic fluid collection and concern for possible pancreatitis, left vocal fold paralysis s/p injection 11/1, fevers, volume overload, delirium, tachycardia, exanthematous drug eruption/rash, malnutrition, dysphagia, hyperglycemia, loose stools, and multiple additional electrolyte abnormalities.  He is now admitted to ARU on 11/17/23 for multidisciplinary rehabilitation and ongoing medical management.        Admission to acute inpatient rehab 11/17/23.    Impairment group code: Amputation 05.3 Unilateral LE AKA - s/p left above knee amputation in setting of critical limb ischemia following contained AAA rupture s/p open AAA repair c/b strokes         PT, OT and SLP 60 minutes of each on a daily basis, in addition to rehab nursing and close management of physiatrist.       Impairment of ADL's: Noted to have  weakness, impaired balance,  baseline visual impairments (low vision), fatigue, impaired weight shifting, deconditioning, pain, new sternal and abdominal precautions, and LLE NWB status in setting of LLE AKA, all affecting his ability to safely and independently perform basic ADLs.  Goal for CGA or less with basic ADLs.     Impairment of mobility:  Noted to have  weakness, impaired balance,  baseline visual impairments (low vision), fatigue, impaired weight shifting, deconditioning, pain, new sternal and abdominal precautions, and LLE  NWB status in setting of LLE AKA, all affecting his ability to safely and independently perform basic mobility.  Goal for CGA or less with basic transfers and likely mixed mobility vs wheelchair based.     Impairment of cognition/language/swallow:  Noted to have moderate to severe pharyngeal dysphagia and dysphonia with goals for safe tolerance of least restrictive diet and improved cognitive-linguistic skills to meet basic needs.     Medical Conditions  New actions/orders/updates for today are in blue.     Acute critical LLE ischemia s/p L AKA on 10/17/23  Hematoma in LLE residual limb (10/25/23 CTA)  In setting of ruptured AAA as below  - Prevena vac in place at discharge to ARU.  However, vac was removed by vascular surgery on 11/18 due to concerns for bleeding/small hematoma and not conducive to hemostasis.  Assessed by vascular surgery 11/20, Prevena wound vac was replaced.  Appreciate assistance.  Plan to leave intact for 7 days, to be managed by vascular surgery.  Contact vascular surgery if any questions, concerns, or new bleeding episodes.  Leave LLE AKA wrapped with ACE.   - Vascular surgery planning to remove every other staple on 11/30  - Continue Expedite daily for wound healing  - Vascular surgery will follow weekly at ARU.  Will plan to see patient today or tomorrow to assess ongoing wound cares and suture removal.  - NWB LLE  - Pain management: Tylenol 975 mg TID scheduled + additional doses PRN, atarax 25 mg q6h PRN, oxycodone 5 mg q4h PRN, wean as able.  - Orthotist adjusted fit of FloTech limb protector 11/20 due to decreased swelling.  Appreciate assistance.  - Vascular surgery will follow 1-2x/week at ARU  - Continue PT/OT  - Follow up with vascular surgery   - Look into [facility], secondarily follow up with amputee clinic here     Ruptured pararenal AAA s/p open repair 9/24/23 c/b shock, colonic ischemia  S/p multiple exploratory laparotomy and washout (9/25, 9/26,  "9/29), fascia closure 10/2 with delayed primary skin closure on 10/20  - Wound care: Apply iodeine/betadine, allow to dry.  Reapply clean dry dressing.  OK to shower, do not scrub/soak.  Every other staple removed by vascular surgery 11/16, remainder to be removed by vascular surgery on 11/30  - Activity restrictions: No lifting, pushing, or pulling greater than 10 pounds and no strenuous exercise for 4-6 weeks.  Given has been longer than this, asked vascular surgery about ability to gradually advance.  They will address when they visit.  - Abdominal binder on at all times  - Statin as below  - Pain management as above  - Vascular surgery will follow 1-2x/week at ARU  - Follow up with vascular surgery     Acute ischemic stroke of multiple vessel territory due to embolic stroke of undetermined source (ESUS)   Code stroke 10/6 for new R facial droop and anisocoria.  CT head was without acute stroke or bleed, CTA was without proximal large vessel occlusion but did reveal concern for pulmonary embolism and had a normal perfusion scan. He was not a candidate for thrombolysis based on being outside 4.5 hours conventional time window, multiple major surgeries, thrombocytopenia and concern for GI bleed. He was not a candidate for thrombectomy considering absence of proximal large vessel occlusion.  MRI with above findings.  - BP management as below  - Apixaban as below  - Continue atorvastatin 10 mg daily  - Follow up with stroke neurology in 4-6 weeks     PE (incidental finding on CTA head/neck 10/6/23 for stroke code)  DVT LLE (U/S 10/7/23)  Initially on Heparin drip, required multiple brief holds due to bleeding concerns as below.  Ultimately transitioned to DOAC on 11/15.  - Continue apixaban 5 mg BID  - Per discharge summary, \"he will need [facility] management for cost effective co-pays, otherwise his co-pays will be very expensive.\"   - Monitor respiratory status, continue supplemental oxygen PRN to maintain O2 sats >92%   "   Left iliac hematoma (10/13/23 CT)  Left psoas muscle hematoma (10/24/23 CTA)  Stable on repeat imaging.  Could contribute to weakness.  - Monitor Hgb as below and signs/symptoms of worsening bleeding     Anemia, multifactorial including acute blood loss, renal disease  Thrombocytopenia  Required multiple transfusions throughout course.  Hgb stable in 7-8s. Platelets 100-150s.  11/27: Hgb stable at 8.0; platelets stable at 143  - Monitor for evidence of recurrent bleeding  - Continue to trend CBC every M/Th     Tachycardia, suspect multifactorial including debility, pain, hydration status, PE  Hx hypertension  PTA on amlodipine 5 mg, benazepril 20 mg.  Held earlier this admission due to shock.  Restarted on amlodipine and added metoprolol due to persistent tachycardia.    - Continue amlodipine 10 mg daily  - Continue metoprolol tartrate 100 mg BID  - No ACE for now per vascular surgery  - Monitor BP/HR per unit routine     MAYA, improved  Renal emboli  Baseline Cr ~1.0.  CT with scattered areas of infarct related to emboli with AAA.  Cr rising after open AAA repair, peaked >5.  Started on CRRT 9/30, transitioned to HD on 10/5, last run on 10/27, HD line removed 11/2.  Cr most recently 1.9-2.1 range.  11/27: Cr stable at 2.02  - Avoid nephrotoxins as able  - Trend renal function every M/Th  - Follow up in CKD/post-MAYA clinic     Hyponatremia  Started around 11/9, has been in 132-134 range since then, but lower at 130 on 11/16-11/17.  11/27: Na stable at 133  - Trend BMP every M/Th  - Further work-up if persists     Left vocal fold paralysis/glottic insufficiency s/p laryngoscopy with injection laryngoplasty on 11/1  Dysphonia  Moderate-Severe Oropharyngeal Dysphagia   ENT consulted 10/28, fiberoptic endoscopy completed.  S/p injection 11/1.  - Continue SLP  - Regular diet; moderately thick (level 3) liquids. Ok for free water protocol per SLP.  Per SLP, benefits from set-up assist and intermittent supervision during  meals, needs to be fully upright.  Ok to leave thickened liquids within patient's reach if upright in order to promote improved hydration.  - Per SLP, plan for repeat VFSS today  - Follow up with ENT in 1 month (~12/1)     Delirium, multifactorial, improving  - Delirium precautions, sleep hygiene  - Continue seroquel 75 mg at HS (decreased 11/27).  Plan for ongoing wean as able.  - Continue melatonin 3 mg at 1900 (increased 11/27 with seroquel decrease  - Continue seroquel 25 mg BID PRN for agitation.     Adjustment disorder with depressed mood   Seen by psychology during acute hospital stay.  Not on medications.  - Team noting concerns for depressed mood.  Psychology consult placed, appreciate assistance.  See their note on 11/22 for details.  - Continue to monitor     Severe malnutrition in the context of acute illness/disease   Peripancreatic fluid collection on CT 10/13, concern possible pancreatitis, improved on repeat imaging  Previously on tube feeding, tube removed 11/13 per patient/family request. Juvencio counts with a lot of missing information, but reportedly improving intake.  Recent fecal elastase normalized, no ongoing need for Creon (concerns for possibly pancreatitis earlier this admission).   - Regular diet; moderately thick (level 3) liquid  - RD consulted, appreciate assistance  - Supplements per RD recs    Constipation  Nausea  No BM since 11/24-11/26.  On daily psyllium and senokot BID (though has not been taking PM dose).  Notes nausea on and off throughout admission.  Had overnight 11/26-11/27.  No abdominal pain, nausea resolved.    - Did have BM x3 on 11/27-11/28 s/p suppository  - Encourage to utilize senokot BID as prescribed  - Added scheduled docusate daily (starting 11/27)  - Continue psyllium daily  - PRN Zofran for nausea  - If nausea persists despite resolution of constipation, can evaluate for alternative causes     Hx bilateral legal 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  - Noted        Adjustment to disability:  Clinical psychology to eval and treat if indicated  FEN: regular diet; moderately thick (level 3) liquids.  Fully upright for all PO intake.  Bowel: continent, constipation as above  Bladder: continent.  Encourage double voiding, optimize positioning.    DVT Prophylaxis: on apixaban as above  GI Prophylaxis: PPI  Code: full code  Disposition: goal for home  ELOS:  target 12/6/23   Follow up Appointments on Discharge: PCP in 1-2 weeks, vascular surgery, stroke neurology in 4-6 weeks, ENT (~12/1), nephrology, PM&R in ~6 weeks ([facility])      I, Zofia Bray, spent a total of 45 minutes face-to-face or managing the care of Alfredo Burnham. Over 50% of my time on the unit was spent counseling the patient and coordinating care. See note for details.     Patient was seen and discussed with Dr. Gilberto Merritt, PM&R staff physician     Zofia Bray PA-C  Physical Medicine & Rehabilitation

## 2023-11-28 NOTE — PROGRESS NOTES
Acute Rehab Care Conference/Team Rounds      Type: Team Rounds    Present: Dr. Merritt, Zofia Bray PA, Dr. Desai Neuropsychologist, Ania Diamond PT, Ashely Spence OT, Marquita Russ SLP, Cora Mooney , Michelle Sandhu RD, Larisa Bustamante RN, and Alfredo Burnham Patient.       Discharge Barriers/Treatment/Education    Rehab Diagnosis: post L AKA     Active Medical Co-morbidities/Prognosis:     Patient Active Problem List   Diagnosis Code    Hypertension I10    Infrarenal abdominal aortic aneurysm (AAA) without rupture (H24) I71.43    Hypertension, unspecified type I10    Infrarenal abdominal aortic aneurysm, ruptured (H) I71.33    Acute kidney failure with tubular necrosis (H) N17.0    S/P above knee amputation, left (H) Z89.612         Safety: Pt is A/O x4. No impulsive behavior overnight, able to make needs known. Transfers A1 slideboard, w/c based.    Pain: Denies pain overnight. Pain managed with scheduled/PRN Tylenol and PRN Oxycodone 5 mg.    Medications, Skin, Tubes/Lines: Takes medications whole with moderately thickened liquids. L-AKA incision with Prevena wound vac, serosanguinous drainage noted in tubing. Abdominal incision dressed with Primipore CDI. R-heel blanchable redness dressed with Mepilex CDI.     Swallowing/Nutrition: : Regular, moderately thick (3) liquid. Set up assist meals, Fully upright all PO, small single sips/bites. Ok for ice chips via free water protocol (FWP). VFSS to be repeated 11/28. Noting limited initiation and IND completion of exercises outside of SLP    Bowel/Bladder: Mixed continence B/B. Constipated since 11/23, had BM x2 11/27 following suppository per report. Uses urinal independently with staff assistance to empty.    Psychosocial: Pt lives with spouse in accessible senior living apartment. Wife, Tabby, is home during the day. Per chart review, Psych consult was completed 11/10/23 and diagnosed with Adjustment disorder with depressed  mood. Health psychology to follow patient approximately once per week for the duration of their hospitalization. Jaky (daughter), Génesis (daughter), John Paul (son) are all local.        ADLs/IADLs: Pt is IND for UB cares, min A for LB dressing and mod A for toileting. CGA for slideboard transfers. Pt making progress but limited by fatigue, decreased activity tolerance, mild cognition/problem solving deficit. [facility] the [facility] and pt's wife has been trained on SB transfers to the . Need to continue with family training as pt may require some assist at discharge. Anticipate he will require setup assist with transfers. Recommend HH OT to continue progressing his independence.     Mobility: Pt is R[place]a rolling, variable assist needed for sup<>sit. CGA>SBA for slideboard transfers w/ setup assist. Initial training completed w/ spouse but additional training needed to ensure safe setup with variable transfer surfaces. Setup assist for w/c propulsion, fatigue limiting distance. Fatigue and cognition limiting independence w/ transfers. [facility] through [facility] w/ therapy team following up. HHPT.     Cognition/Language:  Mild - mod cognitive impairment re: attention, memory, executive functions. Noted impaired working memory and poor carryover of learned material across sessions and disciplines. Informal assessment completed as pt is limited by vision. Does occasionally benefit from large font but is inconsistent with ability to orient to this and utilize functionally.     Community Re-Entry: Recommending ongoing therapies to promote improved safety and ind w/ mobility.     Transportation: Car transfer not a barrier. Family or friend will provide.     Decision maker: self    Plan of Care and goals reviewed and updated.    Discharge Plan/Recommendations    Fall Precautions: continue    Patient/Family input to goals: yes     Estimated length of stay: 18 days     Overall plan for the patient: reach a level of mod I        Utilization Review and Continued Stay Justification    Medical Necessity Criteria:    For any criteria that is not met, please document reason and plan for discharge, transfer, or modification of plan of care to address.    Requires intensive rehabilitation program to treat functional deficits?: Yes    Requires 3x per week or greater involvement of rehabilitation physician to oversee rehabilitation program?: Yes    Requires rehabilitation nursing interventions?: Yes    Patient is making functional progress?: Yes    There is a potential for additional functional progress? Yes    Patient is participating in therapy 3 hours per day a minimum of 5 days per week or 15 hours per week in 7 day period?:Yes    Has discharge needs that require coordinated discharge planning approach?:Yes      Barriers/Concerns related to meeting medical necessity criteria:  none    Team Plan to Address Concern:  As needed      Final Physician Sign off    Statement of Approval: Gilberto Merritt, DO      Patient Goals  Social Work Goals: Confirm discharge recommendations with therapy, coordinate safe discharge plan and remain available to support and assist as needed.      OT: Upper Body Dressing: Modified independent  OT: Lower Body Dressing: Modified independent  OT: Upper Body Bathing: Supervision/stand-by assist  OT: Lower Body Bathing: Supervision/stand-by assist  OT: Transfer: Supervision/stand-by assist (walk in shower transfer)  OT: Toilet Transfer/Toileting: Modified independent, toilet transfer, cleaning and garment management  OT: Cognitive: Patient/caregiver will verbalize understanding of cognitive assessment results/recommendations as needed for safe discharge planning       PT Predicted Duration/Target Date for Goal Attainment: 12/06/23  PT Frequency: Daily  PT: Bed Mobility: Supervision/stand-by assist  PT: Transfers: Supervision/stand-by assist  PT: Wheelchair Mobility: 150 feet, manual wheelchair, Caregiver SBA  PT:  Goal 1: car transfer at supervision level  PT: Goal 2: pt will reclal 3 fall prevention methods for safe discharge home             SLP Predicted Duration/Target Date for Goal Attainment: 12/09/23  Therapy Frequency (SLP Eval): daily  SLP: Safely tolerate diet without signs/symptoms of aspiration: Regular diet, Slightly thick liquids, With use of swallow precautions, With use of compensatory swallow strategies  SLP: Goal 1: Pt will demonstrate and initiate a plan with basic - mod complexity wiht 90% accuracy min cue s  SLP: Goal 2: pt will recall novel and functional information of mod complexity with min cues 90% accuracy             Nursing Goals  Bowel and Bladder care  Fall prevention   Medication Education  Skin Care protection        Goal: Mobility: Pt will be educated on how to use assistive device to improve mobility  Goal: Skin Integrity: Pt will be educated on proper weight shift,  turning and repositionig to prevent skin breakdown    Goal: Safety Management: Pt will be educated on how to use the call light for assistance evidenced by absence of fall

## 2023-11-28 NOTE — PLAN OF CARE
"Goal Outcome Evaluation:         Overall Patient Progress: improving       VS:  /77 (BP Location: Right arm, Patient Position: Semi-Carpenter's, Cuff Size: Adult Regular)   Pulse 84   Temp 97.8  F (36.6  C) (Oral)   Resp 18   Ht 1.702 m (5' 7\")   Wt 58.1 kg (128 lb 1.4 oz)   SpO2 98%   BMI 20.06 kg/m      O2:  >95% on RA   Output:  Voids w/o difficulty; staff set ups & empties urinal @ bedside   Last BM:  11/27 s/p suppository; small, soft, formed, brown   Activity:  NOOB this shift   Skin:  Abd incision dressing changed;   L AKA w/ Prevena;   R heel wound   Pain:  Pt denied pain but asked for PRN oxy for bedtime   CMS:  A&O X4; pt denied numbness or tingling   Dressing:  CDI   Diet:  Reg/mod thick (lvl3)/pill whole   LDA:  L AKA w/ Prevena   Equipment:  Personal belongings   Plan:  Cont' POC   Additional Info:         "

## 2023-11-28 NOTE — PROGRESS NOTES
Team rounds this morning. Targeting discharge next Wednesday, 12/6. Pt will need home PT/OT/SLP/RN. Team also recommending commode, tub bench, gait belt, wheelchair, slide board, and bed rail. [facility], but no response yet. Pt's wife had further questions regarding VA coverage and medications.    SW spoke with pt's wife in the afternoon. She shared that she hasn't been able to connect with anyone at the [facility] to get answers. She shared that pt is supposed to get his medications through the [facility], but lately pt/pt's wife have been paying for medications out of pocket at their local pharmacy since the [facility] is not nearby. Pt's wife is also looking into additional Medicare plans with Part D coverage so they don't have to work with the [facility], but has only found plans with high premiums and possible non-coverage for some of pt's medications. SW encouraged pt's wife to call the Senior Linkage Line as they have information on which Medicare plans could cover pt's medications. Pt's wife shared that pt only gets eye care through the [facility], he does not have a PCP. Pt's wife was going to set up a PCP appointment soon so pt can get his equipment ordered prior to discharge - LIZBET informed her that pt cannot complete outpatient appointments while at [facility]. LIZBET reached out to the [facility] Social Work [facility] and left a voicemail to discuss pt's benefits. LIZBET will follow up again tomorrow.     SARIAH Guillory  Post Acute Float   ARU/SIA/[facility]    Phone: [phone]  Fax: [phone]

## 2023-11-29 ENCOUNTER — APPOINTMENT (OUTPATIENT)
Dept: SPEECH THERAPY | Facility: CLINIC | Age: 68
DRG: 559 | End: 2023-11-29
Attending: PHYSICAL MEDICINE & REHABILITATION
Payer: COMMERCIAL

## 2023-11-29 ENCOUNTER — APPOINTMENT (OUTPATIENT)
Dept: OCCUPATIONAL THERAPY | Facility: CLINIC | Age: 68
DRG: 559 | End: 2023-11-29
Attending: PHYSICAL MEDICINE & REHABILITATION
Payer: COMMERCIAL

## 2023-11-29 ENCOUNTER — APPOINTMENT (OUTPATIENT)
Dept: PHYSICAL THERAPY | Facility: CLINIC | Age: 68
DRG: 559 | End: 2023-11-29
Attending: PHYSICAL MEDICINE & REHABILITATION
Payer: COMMERCIAL

## 2023-11-29 LAB
BASOPHILS # BLD AUTO: 0 10E3/UL (ref 0–0.2)
BASOPHILS NFR BLD AUTO: 1 %
EOSINOPHIL # BLD AUTO: 1 10E3/UL (ref 0–0.7)
EOSINOPHIL NFR BLD AUTO: 12 %
ERYTHROCYTE [DISTWIDTH] IN BLOOD BY AUTOMATED COUNT: 15.2 % (ref 10–15)
HCT VFR BLD AUTO: 24.1 % (ref 40–53)
HGB BLD-MCNC: 7.8 G/DL (ref 13.3–17.7)
IMM GRANULOCYTES # BLD: 0.1 10E3/UL
IMM GRANULOCYTES NFR BLD: 2 %
LYMPHOCYTES # BLD AUTO: 1.1 10E3/UL (ref 0.8–5.3)
LYMPHOCYTES NFR BLD AUTO: 13 %
MCH RBC QN AUTO: 30.8 PG (ref 26.5–33)
MCHC RBC AUTO-ENTMCNC: 32.4 G/DL (ref 31.5–36.5)
MCV RBC AUTO: 95 FL (ref 78–100)
MONOCYTES # BLD AUTO: 0.6 10E3/UL (ref 0–1.3)
MONOCYTES NFR BLD AUTO: 7 %
NEUTROPHILS # BLD AUTO: 5.8 10E3/UL (ref 1.6–8.3)
NEUTROPHILS NFR BLD AUTO: 65 %
NRBC # BLD AUTO: 0 10E3/UL
NRBC BLD AUTO-RTO: 0 /100
PLATELET # BLD AUTO: 157 10E3/UL (ref 150–450)
RBC # BLD AUTO: 2.53 10E6/UL (ref 4.4–5.9)
WBC # BLD AUTO: 8.6 10E3/UL (ref 4–11)

## 2023-11-29 PROCEDURE — 87070 CULTURE OTHR SPECIMN AEROBIC: CPT | Performed by: PHYSICIAN ASSISTANT

## 2023-11-29 PROCEDURE — 99024 POSTOP FOLLOW-UP VISIT: CPT | Performed by: NURSE PRACTITIONER

## 2023-11-29 PROCEDURE — 99232 SBSQ HOSP IP/OBS MODERATE 35: CPT | Mod: FS | Performed by: PHYSICIAN ASSISTANT

## 2023-11-29 PROCEDURE — 92526 ORAL FUNCTION THERAPY: CPT | Mod: GN

## 2023-11-29 PROCEDURE — 97129 THER IVNTJ 1ST 15 MIN: CPT | Mod: GN | Performed by: SPEECH-LANGUAGE PATHOLOGIST

## 2023-11-29 PROCEDURE — 87040 BLOOD CULTURE FOR BACTERIA: CPT | Performed by: PHYSICIAN ASSISTANT

## 2023-11-29 PROCEDURE — 97535 SELF CARE MNGMENT TRAINING: CPT | Mod: GO

## 2023-11-29 PROCEDURE — 97530 THERAPEUTIC ACTIVITIES: CPT | Mod: GP

## 2023-11-29 PROCEDURE — 97130 THER IVNTJ EA ADDL 15 MIN: CPT | Mod: GN | Performed by: SPEECH-LANGUAGE PATHOLOGIST

## 2023-11-29 PROCEDURE — 85025 COMPLETE CBC W/AUTO DIFF WBC: CPT | Performed by: PHYSICIAN ASSISTANT

## 2023-11-29 PROCEDURE — 128N000003 HC R&B REHAB

## 2023-11-29 PROCEDURE — 250N000013 HC RX MED GY IP 250 OP 250 PS 637: Performed by: PHYSICIAN ASSISTANT

## 2023-11-29 PROCEDURE — 250N000013 HC RX MED GY IP 250 OP 250 PS 637

## 2023-11-29 PROCEDURE — 36415 COLL VENOUS BLD VENIPUNCTURE: CPT | Performed by: PHYSICIAN ASSISTANT

## 2023-11-29 RX ORDER — POLYETHYLENE GLYCOL 3350 17 G/17G
17 POWDER, FOR SOLUTION ORAL DAILY PRN
Status: ON HOLD | DISCHARGE
Start: 2023-11-29 | End: 2023-12-19

## 2023-11-29 RX ORDER — CALCIUM CARBONATE 500 MG/1
1 TABLET, CHEWABLE ORAL 2 TIMES DAILY PRN
Status: ON HOLD | DISCHARGE
Start: 2023-11-29 | End: 2023-12-19

## 2023-11-29 RX ORDER — ACETAMINOPHEN 325 MG/1
650 TABLET ORAL EVERY 6 HOURS PRN
Status: ON HOLD | DISCHARGE
Start: 2023-11-29 | End: 2023-12-19

## 2023-11-29 RX ORDER — METOPROLOL TARTRATE 100 MG
100 TABLET ORAL 2 TIMES DAILY
Status: ON HOLD | DISCHARGE
Start: 2023-11-29 | End: 2023-12-19

## 2023-11-29 RX ORDER — OXYCODONE HYDROCHLORIDE 5 MG/1
5 TABLET ORAL EVERY 6 HOURS PRN
Status: ON HOLD | DISCHARGE
Start: 2023-11-29 | End: 2023-12-19

## 2023-11-29 RX ORDER — LANOLIN ALCOHOL/MO/W.PET/CERES
3 CREAM (GRAM) TOPICAL EVERY EVENING
DISCHARGE
Start: 2023-11-29

## 2023-11-29 RX ORDER — SENNOSIDES 8.6 MG
1 TABLET ORAL 2 TIMES DAILY
Status: ON HOLD | DISCHARGE
Start: 2023-11-29 | End: 2023-12-19

## 2023-11-29 RX ORDER — ONDANSETRON 4 MG/1
4 TABLET, ORALLY DISINTEGRATING ORAL EVERY 6 HOURS PRN
Status: ON HOLD | DISCHARGE
Start: 2023-11-29 | End: 2023-12-19

## 2023-11-29 RX ORDER — QUETIAPINE FUMARATE 25 MG/1
75 TABLET, FILM COATED ORAL AT BEDTIME
Status: ON HOLD | DISCHARGE
Start: 2023-11-29 | End: 2023-12-19

## 2023-11-29 RX ORDER — CLINDAMYCIN PHOSPHATE 900 MG/50ML
900 INJECTION, SOLUTION INTRAVENOUS
Status: CANCELLED | OUTPATIENT
Start: 2023-11-29

## 2023-11-29 RX ORDER — PANTOPRAZOLE SODIUM 40 MG/1
40 TABLET, DELAYED RELEASE ORAL EVERY 24 HOURS
Status: ON HOLD | DISCHARGE
Start: 2023-11-29 | End: 2023-12-19

## 2023-11-29 RX ORDER — CLINDAMYCIN PHOSPHATE 900 MG/50ML
900 INJECTION, SOLUTION INTRAVENOUS SEE ADMIN INSTRUCTIONS
Status: CANCELLED | OUTPATIENT
Start: 2023-11-29

## 2023-11-29 RX ORDER — VITAMIN B COMPLEX
50 TABLET ORAL EVERY 24 HOURS
Status: ON HOLD | DISCHARGE
Start: 2023-11-30 | End: 2023-12-19

## 2023-11-29 RX ORDER — ACETAMINOPHEN 325 MG/1
975 TABLET ORAL 3 TIMES DAILY
Status: ON HOLD | DISCHARGE
Start: 2023-11-29 | End: 2023-12-19

## 2023-11-29 RX ADMIN — ACETAMINOPHEN 975 MG: 325 TABLET, FILM COATED ORAL at 13:19

## 2023-11-29 RX ADMIN — OXYCODONE HYDROCHLORIDE 5 MG: 5 TABLET ORAL at 20:44

## 2023-11-29 RX ADMIN — AMLODIPINE BESYLATE 10 MG: 10 TABLET ORAL at 07:58

## 2023-11-29 RX ADMIN — METOPROLOL TARTRATE 100 MG: 50 TABLET, FILM COATED ORAL at 08:00

## 2023-11-29 RX ADMIN — METOPROLOL TARTRATE 100 MG: 50 TABLET, FILM COATED ORAL at 20:34

## 2023-11-29 RX ADMIN — PSYLLIUM HUSK 1 PACKET: 3.4 POWDER ORAL at 07:57

## 2023-11-29 RX ADMIN — SENNOSIDES 1 TABLET: 8.6 TABLET, FILM COATED ORAL at 20:33

## 2023-11-29 RX ADMIN — PANTOPRAZOLE SODIUM 40 MG: 40 TABLET, DELAYED RELEASE ORAL at 17:28

## 2023-11-29 RX ADMIN — ACETAMINOPHEN 975 MG: 325 TABLET, FILM COATED ORAL at 20:33

## 2023-11-29 RX ADMIN — DOCUSATE SODIUM 50 MG: 50 CAPSULE, LIQUID FILLED ORAL at 07:58

## 2023-11-29 RX ADMIN — ATORVASTATIN CALCIUM 10 MG: 10 TABLET, FILM COATED ORAL at 20:34

## 2023-11-29 RX ADMIN — Medication 50 MCG: at 13:19

## 2023-11-29 RX ADMIN — SENNOSIDES 1 TABLET: 8.6 TABLET, FILM COATED ORAL at 08:01

## 2023-11-29 RX ADMIN — APIXABAN 5 MG: 5 TABLET, FILM COATED ORAL at 08:00

## 2023-11-29 RX ADMIN — QUETIAPINE 75 MG: 25 TABLET ORAL at 20:32

## 2023-11-29 RX ADMIN — Medication 3 MG: at 20:33

## 2023-11-29 RX ADMIN — ACETAMINOPHEN 975 MG: 325 TABLET, FILM COATED ORAL at 07:58

## 2023-11-29 ASSESSMENT — ACTIVITIES OF DAILY LIVING (ADL)
ADLS_ACUITY_SCORE: 34

## 2023-11-29 NOTE — DISCHARGE SUMMARY
[facility]   Acute Rehabilitation Unit  Discharge summary     Date of Admission: 11/17/2023  Date of Discharge: 11/30/2023   Disposition: [facility] for vascular surgery intervention on 11/30/23  Primary Care Physician: Yuki Wrgiht  Attending physician: Gilberto Merritt,       DISCHARGE DIAGNOSIS    Wound infection, LLE  Acute critical LLE ischemia s/p L AKA on 10/17/23 c/b hematoma in residual limb   Ruptured pararenal AAA s/p open repair 9/24/23   Acute ischemic stroke of multiple vessel territory  PE/DVT  Left iliac and psoas muscle hematoma  Anemia, thrombocytopenia  Hypertension  Left vocal fold paralysis s/p injection 11/1, ongoing dysphonia  Minimal oropharyngeal dysphagia  Adjustment disorder with depressed mood  Severe malnutrition  Constipation  Hx bilateral legal 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       BRIEF SUMMARY  Alfredo Burnham is a 68 year old right hand dominant male with a past medical history of hypertension, TIA, 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, thrombocytopenia, and tobacco use disorder who was admitted on 9/24/23 with ruptured pararenal abdominal aortic aneurysm s/p open repair complicated by shock and colonic ischemic requiring multiple returns to OR for exploratory laparotomy, washout, and delayed closure with hospital course complicated by LLE ischemia ultimately 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, hematochezia with concern for possible bowel ischemia (no intramural ischemia on ex lap), MAYA requiring CRRT/HD (now off), acute hypoxic respiratory failure requiring 2 separate intubations, PE/DVT, aspiration event, bilateral pleural effusions, acute blood loss anemia/thrombocytopenia (requiring multiple transfusions), multifocal embolic strokes, left psoas muscle and iliac hematomas, peripancreatic fluid collection and concern for possible pancreatitis, left vocal fold paralysis s/p injection 11/1, fevers, volume overload, delirium,  tachycardia, exanthematous drug eruption/rash, malnutrition, dysphagia, hyperglycemia, loose stools, and multiple additional electrolyte abnormalities.  He was admitted to ARU on 11/17/23 for multidisciplinary rehabilitation and ongoing medical management.     Patient has been making good functional gains with therapies at ARU.  Pain has been well-managed.  Vascular surgery has been following for management of wound vac.  Upon their evaluation on 11/29, concerns for potential wound infection as below.    REHABILITATION COURSE  PT: Discharged to hospital for surgical intervention.    Current Status: *Spouse ok to assist slideboard bed<>w/c only.   Bed Mobility: Jenny rolling, sup<>sit up to modA trunk assist  Transfer: SBA and setup assist w/ slideboard. Ax1-2 w/ nsg.   Gait: Unable, unsafe  Stairs: Unable, unsafe  Balance: Fair dynamic sitting. Requires CGA for quasistatic standing in // bars    OT: Discharged to hospital for surgical intervention.    Current Status:  ADLs:  Mobility: Wc based, slide board Ax1, *wife cleared to assist bed<->w/c via slideboard, Nursing Assist 1 Via Slideboard, OT SB transfer bed to droparm padded commode w/ min A and cues for tech and commode to w/c w/ th placing sliding board and providing min  A and directives for technique  Grooming: IND WC based  Dressing: UB: IND, LB min A in supine  Bathing: [id number] slideboard to tub bench, min A for bathing seated  Toileting: slide board bed to commode or Wc to commode over the toilet with Ax1 in therapy, Ax2 with nursing, mod A for clothing management and total assist rear pericares  IADLs: Wife can assist  Vision/Cognition: baseline visual deficits as pt is legally blind, need to further assess cognition.    SLP: Discharged to hospital for surgical intervention.    Current Status:  Hearing: WFL  Vision: impaired; uses magnifying glass to read large print  Communication: aphonic. VF injection completed by ENT   Cognition: Mild cognitive  impairment re: attention, memory, executive functions. Informal assessment completed as pt is limited by vision. Noting impaired cognition across other disciplines     Swallow: Regular, thin (0) liquid. Set up assist meals, Fully upright all PO, small single sips/bites. Ok for ice chips via free water protocol (FWP)    MEDICAL COURSE    Wound infection, L AKA  Evaluated by vascular surgery team on 11/29 PM for wound vac change.  See their note for additional details.  In short, found that wound vac not suctioning and surgical site with significant sanguinous and purulent appearing material expressed.  Has not noted increased pain.  No signs of systemic illness - afebrile, WBC WNL on last check 11/27, VSS.    - Stat CBC, wound and blood cultures  - Plan for OR tomorrow with vascular surgery for I&D  - NPO at midnight in preparation for surgery  - Hold apixaban  - Transfer to [facility] when bed available in preparation for surgical intervention  - Monitor closely in meantime for any signs of clinical deterioration    Below copied from progress note on same date by this writer:  Acute critical LLE ischemia s/p L AKA on 10/17/23  Hematoma in LLE residual limb (10/25/23 CTA)  In setting of ruptured AAA as below  - Prevena vac in place at discharge to ARU.  However, vac was removed by vascular surgery on 11/18 due to concerns for bleeding/small hematoma and not conducive to hemostasis.  Assessed by vascular surgery 11/20, Prevena wound vac was replaced.  Appreciate assistance.  Plan to leave intact for 7 days, to be managed by vascular surgery.  Contact vascular surgery if any questions, concerns, or new bleeding episodes.  Leave LLE AKA wrapped with ACE.   - Vascular surgery planning to remove every other staple on 11/30  - Continue Expedite daily for wound healing  - Vascular surgery will follow weekly at ARU.  Per discussion with provider yesterday, will plan to see patient today to assess ongoing wound cares and  suture removal.  - NWB LLE  - Pain management: Tylenol 975 mg TID scheduled + additional doses PRN, atarax 25 mg q6h PRN, oxycodone 5 mg q4h PRN, wean as able.  - Orthotist adjusted fit of FloTech limb protector 11/20 due to decreased swelling.  Appreciate assistance.  - Vascular surgery will follow 1-2x/week at ARU  - Continue PT/OT  - Follow up with vascular surgery   - [facility], secondarily follow up with amputee clinic here     Ruptured pararenal AAA s/p open repair 9/24/23 c/b shock, colonic ischemia  S/p multiple exploratory laparotomy and washout (9/25, 9/26, 9/29), fascia closure 10/2 with delayed primary skin closure on 10/20  - Wound care: Apply iodeine/betadine, allow to dry.  Reapply clean dry dressing.  OK to shower, do not scrub/soak.  Every other staple removed by vascular surgery 11/16, remainder to be removed by vascular surgery on 11/30  - Activity restrictions: No lifting, pushing, or pulling greater than 10 pounds and no strenuous exercise for 4-6 weeks.  Given has been longer than this, asked vascular surgery about ability to gradually advance.  They will address when they visit.  - Abdominal binder on at all times  - Statin as below  - Pain management as above  - Vascular surgery will follow 1-2x/week at ARU  - Follow up with vascular surgery     Acute ischemic stroke of multiple vessel territory due to embolic stroke of undetermined source (ESUS)   Code stroke 10/6 for new R facial droop and anisocoria.  CT head was without acute stroke or bleed, CTA was without proximal large vessel occlusion but did reveal concern for pulmonary embolism and had a normal perfusion scan. He was not a candidate for thrombolysis based on being outside 4.5 hours conventional time window, multiple major surgeries, thrombocytopenia and concern for GI bleed. He was not a candidate for thrombectomy considering absence of proximal large vessel occlusion.  MRI with above  "findings.  - BP management as below  - Apixaban as below  - Continue atorvastatin 10 mg daily  - Follow up with stroke neurology in 4-6 weeks     PE (incidental finding on CTA head/neck 10/6/23 for stroke code)  DVT LLE (U/S 10/7/23)  Initially on Heparin drip, required multiple brief holds due to bleeding concerns as below.  Ultimately transitioned to DOAC on 11/15.  - Continue apixaban 5 mg BID  - Per discharge summary, \"[facility] will need [facility] for [facility] co[facility]ays, otherwise his co-pays will be very expensive.\"  See pharmacy liaison note on 11/13 for details.  Can get 1 month free at discharge pharmacy but future fills should be through [facility] to optimize cost.  - Monitor respiratory status, continue supplemental oxygen PRN to maintain O2 sats >92%     Left iliac hematoma (10/13/23 CT)  Left psoas muscle hematoma (10/24/23 CTA)  Stable on repeat imaging.  Could contribute to weakness.  - Monitor Hgb as below and signs/symptoms of worsening bleeding     Anemia, multifactorial including acute blood loss, renal disease  Thrombocytopenia  Required multiple transfusions throughout course.  Hgb stable in 7-8s. Platelets 100-150s.  11/27: Hgb stable at 8.0; platelets stable at 143  - Monitor for evidence of recurrent bleeding  - Continue to trend CBC every M/Th     Tachycardia, suspect multifactorial including debility, pain, hydration status, PE  Hx hypertension  PTA on amlodipine 5 mg, benazepril 20 mg.  Held earlier this admission due to shock.  Restarted on amlodipine and added metoprolol due to persistent tachycardia.    - Continue amlodipine 10 mg daily  - Continue metoprolol tartrate 100 mg BID  - No ACE for now per vascular surgery  - Monitor BP/HR per unit routine     MAYA, improved  Renal emboli  Baseline Cr ~1.0.  CT with scattered areas of infarct related to emboli with AAA.  Cr rising after open AAA repair, peaked >5.  Started on CRRT 9/30, transitioned to HD on 10/5, last run on 10/27, HD line " removed 11/2.  Cr most recently 1.9-2.1 range.  11/27: Cr stable at 2.02  - Avoid nephrotoxins as able  - Trend renal function every M/Th  - Follow up in CKD/post-MAYA clinic     Hyponatremia  Started around 11/9, has been in 132-134 range since then, but lower at 130 on 11/16-11/17.  11/27: Na stable at 133  - Trend BMP every M/Th  - Further work-up if persists     Left vocal fold paralysis/glottic insufficiency s/p laryngoscopy with injection laryngoplasty on 11/1  Dysphonia  Minimal Oropharyngeal Dysphagia   ENT consulted 10/28, fiberoptic endoscopy completed.  S/p injection 11/1.  - Continue SLP  - Regular diet; moderately thick (level 3) liquids. Ok for free water protocol per SLP.  Per SLP, benefits from set-up assist and intermittent supervision during meals, needs to be fully upright.  Ok to leave thickened liquids within patient's reach if upright in order to promote improved hydration.  - Repeat VFSS on 11/28 with notable improvement per SLP.  Will evaluate at meal trial today to determine if further advances in liquids indicated.  - Follow up with ENT in 1 month (~12/1)     Delirium, multifactorial, improving  - Delirium precautions, sleep hygiene  - Continue seroquel 75 mg at HS (decreased 11/27).  Plan for ongoing wean as able.  - Continue melatonin 3 mg at 1900 (increased 11/27 with seroquel decrease  - Continue seroquel 25 mg BID PRN for agitation.     Adjustment disorder with depressed mood   Seen by psychology during acute hospital stay.  Not on medications.  - Team noting concerns for depressed mood.  Psychology consult placed, appreciate assistance.  See their note on 11/22 for details.  - Continue to monitor     Severe malnutrition in the context of acute illness/disease   Peripancreatic fluid collection on CT 10/13, concern possible pancreatitis, improved on repeat imaging  Previously on tube feeding, tube removed 11/13 per patient/family request. Juvencio counts with a lot of missing information, but  reportedly improving intake.  Recent fecal elastase normalized, no ongoing need for Creon (concerns for possibly pancreatitis earlier this admission).   - Regular diet; moderately thick (level 3) liquid  - RD consulted, appreciate assistance  - Supplements per RD recs     Constipation  Nausea  No BM since 11/24-11/26.  On daily psyllium and senokot BID (though has not been taking PM dose).  Notes nausea on and off throughout admission.  Had overnight 11/26-11/27.  No abdominal pain, nausea resolved.  Did have BM x3 on 11/27-11/28 s/p suppository    - No recurrent nausea   - Encourage to utilize senokot BID as prescribed  - Added scheduled docusate daily (starting 11/27)  - Continue psyllium daily  - PRN Zofran for nausea  - Continue to monitor     Hx bilateral legal 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  - Noted    DISCHARGE MEDICATIONS  Current Discharge Medication List        START taking these medications    Details   docusate sodium (COLACE) 50 MG capsule Take 1 capsule (50 mg) by mouth daily    Associated Diagnoses: Constipation, unspecified constipation type      ondansetron (ZOFRAN ODT) 4 MG ODT tab Take 1 tablet (4 mg) by mouth every 6 hours as needed for nausea or vomiting    Associated Diagnoses: S/P above knee amputation, left (H)      polyethylene glycol (MIRALAX) 17 g packet Take 17 g by mouth daily as needed for constipation    Associated Diagnoses: Constipation, unspecified constipation type           CONTINUE these medications which have CHANGED    Details   !! acetaminophen (TYLENOL) 325 MG tablet Take 2 tablets (650 mg) by mouth every 6 hours as needed for fever    Associated Diagnoses: S/P above knee amputation, left (H)      !! acetaminophen (TYLENOL) 325 MG tablet Take 3 tablets (975 mg) by mouth 3 times daily    Associated Diagnoses: S/P above knee amputation, left (H)      calcium carbonate (TUMS) 500 MG chewable tablet Take 1 tablet (500 mg) by mouth 2 times daily as needed for heartburn     Associated Diagnoses: Infrarenal abdominal aortic aneurysm, ruptured (H)      melatonin 3 MG tablet Take 1 tablet (3 mg) by mouth every evening    Associated Diagnoses: Insomnia, unspecified type      metoprolol tartrate (LOPRESSOR) 100 MG tablet Take 1 tablet (100 mg) by mouth 2 times daily    Associated Diagnoses: Primary hypertension      oxyCODONE (ROXICODONE) 5 MG tablet 1 tablet (5 mg) by Oral or Feeding Tube route every 6 hours as needed for moderate pain or severe pain    Associated Diagnoses: Infrarenal abdominal aortic aneurysm, ruptured (H); Hypertension, unspecified type; Critical limb ischemia of left lower extremity (H); Infrarenal abdominal aortic aneurysm (AAA) without rupture (H24); Acute kidney failure with tubular necrosis (H24)      pantoprazole (PROTONIX) 40 MG EC tablet Take 1 tablet (40 mg) by mouth every 24 hours    Associated Diagnoses: Infrarenal abdominal aortic aneurysm, ruptured (H)      psyllium (METAMUCIL/KONSYL) Packet Take 1 packet by mouth daily    Associated Diagnoses: Constipation, unspecified constipation type      QUEtiapine (SEROQUEL) 25 MG tablet Take 3 tablets (75 mg) by mouth at bedtime    Associated Diagnoses: Insomnia, unspecified type      sennosides (SENOKOT) 8.6 MG tablet Take 1 tablet by mouth 2 times daily    Associated Diagnoses: Constipation, unspecified constipation type      Vitamin D3 (CHOLECALCIFEROL) 25 mcg (1000 units) tablet Take 2 tablets (50 mcg) by mouth every 24 hours    Associated Diagnoses: Infrarenal abdominal aortic aneurysm, ruptured (H)      wound support modular (EXPEDITE) LIQD cup Take 60 mLs by mouth daily    Associated Diagnoses: S/P above knee amputation, left (H)       !! - Potential duplicate medications found. Please discuss with provider.        CONTINUE these medications which have NOT CHANGED    Details   amLODIPine (NORVASC) 10 MG tablet Take 1 tablet (10 mg) by mouth daily    Associated Diagnoses: Infrarenal abdominal aortic aneurysm,  ruptured (H); Hypertension, unspecified type; Critical limb ischemia of left lower extremity (H); Infrarenal abdominal aortic aneurysm (AAA) without rupture (H24); Acute kidney failure with tubular necrosis (H24)      atorvastatin (LIPITOR) 10 MG tablet Take 1 tablet (10 mg) by mouth every evening    Associated Diagnoses: Infrarenal abdominal aortic aneurysm, ruptured (H); Hypertension, unspecified type; Critical limb ischemia of left lower extremity (H); Infrarenal abdominal aortic aneurysm (AAA) without rupture (H24); Acute kidney failure with tubular necrosis (H24)      hydrOXYzine (ATARAX) 25 MG tablet Take 1 tablet (25 mg) by mouth every 6 hours as needed for other (adjuvant pain)    Associated Diagnoses: Infrarenal abdominal aortic aneurysm, ruptured (H); Hypertension, unspecified type; Critical limb ischemia of left lower extremity (H); Infrarenal abdominal aortic aneurysm (AAA) without rupture (H24); Acute kidney failure with tubular necrosis (H24)           STOP taking these medications       apixaban ANTICOAGULANT (ELIQUIS) 5 MG tablet Comments:   Reason for Stopping:         diphenhydrAMINE-zinc acetate (BENADRYL) 1-0.1 % external cream Comments:   Reason for Stopping:         lipase-protease-amylase (CREON 24) [id number] units CPEP per EC capsule Comments:   Reason for Stopping:         lipase-protease-amylase (CREON 24) [id number] units CPEP per EC capsule Comments:   Reason for Stopping:         triamcinolone (KENALOG) 0.1 % external ointment Comments:   Reason for Stopping:                 DISCHARGE INSTRUCTIONS AND FOLLOW UP  Discharge Procedure Orders   Follow Up and recommended labs and tests   Order Comments: Follow up with vascular surgery team for surgical intervention as planned on 11/30/23 and post-operative management per their recommendations.     Reason for your hospital stay   Order Comments: You were admitted for rehabilitation following AAA repair and left above knee  amputation with additional complications and are being discharged back to the hospital with concerns for wound infection in left leg.     Wound care (specify)   Order Comments: Site: Right heel wound(s)   Instructions:  Every 3 days and PRN cleanse with wound cleanser and pat dry. Paint filemon wound skin with no sting. Conform mepilex over wound. Ensure heels are elevated with pillows or heel lift boots at all times.   *May need to use sacral mepilex if 4x4 continues to lift/ peel up.     Wound care   Order Comments: Site: Abdominal incision   Instructions:  Please clean with Betadine, allow to dry, apply Primapore dressing.  Change dressing daily.     Activity - Up with nursing assistance     Order Specific Question Answer Comments   Is discharge order? Yes      Weight bearing status   Order Comments: Nonweightbearing LLE     Wound care   Order Comments: Site: LLE AKA  Instructions: Prevena applied 11/27/23 by vascular surgery.  ACE wrap the left leg stump at all times.     Nutrition Services Adult IP Consult   Order Comments: Reason: continue to monitor and optimize nutrition     Physical Therapy Adult Consult   Order Comments: Evaluate and treat as clinically indicated.    Reason: AKA, stroke     Occupational Therapy Adult Consult   Order Comments: Evaluate and treat as clinically indicated.    Reason: AKA, stroke     Speech Language Path Adult Consult   Order Comments: Evaluate and treat as clinically indicated.    Reason: dysphonia, dysphagia, impaired cognition     Fall precautions     Diet   Order Comments: Follow this diet upon discharge: NPO at midnight for surgery.  Otherwise currently on regular diet, thin liquids.      Snacks/Supplements Adult: Magic Cup; With Meals      Snacks/Supplements Adult: Ensure Enlive; With Meals     Order Specific Question Answer Comments   Is discharge order? Yes           PHYSICAL EXAMINATION    Most recent Vital Signs:   Vitals:    11/28/23 0810 11/28/23 1500 11/29/23 0745  11/29/23 1630   BP:  120/72 122/78 129/79   BP Location:  Left arm Right arm Right arm   Patient Position:       Cuff Size:       Pulse: 99 92 100 93   Resp:  16 16 19   Temp:  97.9  F (36.6  C) 97.7  F (36.5  C) 98.2  F (36.8  C)   TempSrc:  Oral Oral Oral   SpO2:  99% 98% 97%   Weight:       Height:         See progress note on same date by this writer for earlier exam    Gen: NAD, lying in bed  HEENT: NC/AT, MMM  Cardio: appears well-perfused  Pulm: non-labored on room air  Abd: soft, non-tender, non-distended  Extr: LLE residual limb with sanguinous and purulent-appearing material expressed by surgical SANDY from AKA incision, no erythema  Neuro/MSK: awake, alert, right facial droop, anisocoria (L>R), hypophonia/dysphonia, moving all extremities in bed      Discharge summary was forwarded to Clinic, [facility] (PCP) at the time of discharge, so as to bridge from hospital to outpatient care.     It was our pleasure to care for Alfredo Burnham during this hospitalization. Please do not hesitate to contact me should there be questions regarding the hospital course or discharge plan.          Zofia Bray PA-C  Physical Medicine and Rehabilitation

## 2023-11-29 NOTE — PLAN OF CARE
"     Rehabilitation Medicine Wheelchair Face to Face      Diagnosis: s/p left above knee amputation   Currently has limited mobility due to pain, BLE and BUE weakness, left AKA, abdominal precautions, poor activity tolerance.      Current transfer status: Slideboard transfer w/ SBA and setup assist  Distance patient currently able to walk: Pt is non-ambulatory d/t L AKA  Therapy team has ruled out the following less costly options:              Cane due to pt needs BUE support for standing.   Walker due to pt does on abdominal precautions w/ lifting restriction <10lb and pt does not have sufficient UE strength to perform pivot transfer w/ FWW.   Patient will use wheelchair to complete Mobility Related ADL's in the home including toileting, dressing, self cares, meal prep.  Without a wheelchair patient will be unable to safely move about their home. This equipment will allow them to be out of bed, participate in home activities with their family, and it will be part of a fall prevention plan for safe mobility.   Patient's home will accommodate use of wheelchair.  Patient has a family member willing and able to assist as necessary with wheelchair.   Patient has not expressed that they are unwilling to use the wheel chair..      Arm and leg strength: BUEs grossly 4/5. BLEs grossly 4/5     Gait/balance/coordination: Needs SBA for unsupported dynamic sitting tasks. Requires BUE support in standing.      Length of need: Lifetime     Current height: 5'7\"  Current weight: 141 lb  : 53  Acute Rehab Care Conference/Team Rounds      Type: Team Rounds     Present: Dr. Merritt, Zofia Bray PA, Dr. Desai Neuropsychologist, Ania Diamond PT, Ashely Spence OT, Marquita Russ SLP, Cora Mooney , Michelle Sandhu RD, Larisa Bustamante RN, and Alfredo Burnham Patient.         Discharge Barriers/Treatment/Education     Rehab Diagnosis: post L AKA      Active Medical Co-morbidities/Prognosis:    "        Patient Active Problem List   Diagnosis Code    Hypertension I10    Infrarenal abdominal aortic aneurysm (AAA) without rupture (H24) I71.43    Hypertension, unspecified type I10    Infrarenal abdominal aortic aneurysm, ruptured (H) I71.33    Acute kidney failure with tubular necrosis (H) N17.0    S/P above knee amputation, left (H) Z89.612            Safety: Pt is A/O x4. No impulsive behavior overnight, able to make needs known. Transfers A1 slideboard, w/c based.     Pain: Denies pain overnight. Pain managed with scheduled/PRN Tylenol and PRN Oxycodone 5 mg.     Medications, Skin, Tubes/Lines: Takes medications whole with moderately thickened liquids. L-AKA incision with Prevena wound vac, serosanguinous drainage noted in tubing. Abdominal incision dressed with Primipore CDI. R-heel blanchable redness dressed with Mepilex CDI.      Swallowing/Nutrition: : Regular, moderately thick (3) liquid. Set up assist meals, Fully upright all PO, small single sips/bites. Ok for ice chips via free water protocol (FWP). VFSS to be repeated 11/28. Noting limited initiation and IND completion of exercises outside of SLP     Bowel/Bladder: Mixed continence B/B. Constipated since 11/23, had BM x2 11/27 following suppository per report. Uses urinal independently with staff assistance to empty.     Psychosocial: Pt lives with spouse in accessible senior living apartment. Wife, Tabby, is home during the day. Per chart review, Psych consult was completed 11/10/23 and diagnosed with Adjustment disorder with depressed mood. Health psychology to follow patient approximately once per week for the duration of their hospitalization. Jaky (daughter), Génesis (daughter), John Paul (son) are all local.         ADLs/IADLs: Pt is IND for UB cares, min A for LB dressing and mod A for toileting. CGA for slideboard transfers. Pt making progress but limited by fatigue, decreased activity tolerance, mild cognition/problem solving deficit. [facility] and [facility]'s wife has been trained on SB transfers to the . Need to continue with family training as pt may require some assist at discharge. Anticipate he will require setup assist with transfers. Recommend HH OT to continue progressing his independence.      Mobility: Pt is [place] rolling, variable assist needed for sup<>sit. CGA>SBA for slideboard transfers w/ setup assist. Initial training completed w/ spouse but additional training needed to ensure safe setup with variable transfer surfaces. Setup assist for w/c propulsion, fatigue limiting distance. Fatigue and cognition limiting independence w/ transfers. DME order in progress: pt will need tub transfer bench, gait belt, commode, wheelchair, transfer board and bedrail in order to decrease caregiver burden and perform safe mobility/ADLs w/in home. HHPT.      Cognition/Language:  Mild - mod cognitive impairment re: attention, memory, executive functions. Noted impaired working memory and poor carryover of learned material across sessions and disciplines. Informal assessment completed as pt is limited by vision. Does occasionally benefit from large font but is inconsistent with ability to orient to this and utilize functionally.      Community Re-Entry: Recommending ongoing therapies to promote improved safety and ind w/ mobility.      Transportation: Car transfer not a barrier. Family or friend will provide.      Decision maker: self     Plan of Care and goals reviewed and updated.     Discharge Plan/Recommendations     Fall Precautions: continue     Patient/Family input to goals: yes      Estimated length of stay: 18 days      Overall plan for the patient: reach a level of mod I      Utilization Review and Continued Stay Justification     Medical Necessity Criteria:     For any criteria that is not met, please document reason and plan for discharge, transfer, or modification of plan of care to address.     Requires intensive rehabilitation  program to treat functional deficits?: Yes     Requires 3x per week or greater involvement of rehabilitation physician to oversee rehabilitation program?: Yes     Requires rehabilitation nursing interventions?: Yes     Patient is making functional progress?: Yes     There is a potential for additional functional progress? Yes     Patient is participating in therapy 3 hours per day a minimum of 5 days per week or 15 hours per week in 7 day period?:Yes     Has discharge needs that require coordinated discharge planning approach?:Yes        Barriers/Concerns related to meeting medical necessity criteria:  none     Team Plan to Address Concern:  As needed     Final Physician Sign off     Statement of Approval: Gilberto Merritt, DO     Patient Goals  Social Work Goals: Confirm discharge recommendations with therapy, coordinate safe discharge plan and remain available to support and assist as needed.      OT: Upper Body Dressing: Modified independent  OT: Lower Body Dressing: Modified independent  OT: Upper Body Bathing: Supervision/stand-by assist  OT: Lower Body Bathing: Supervision/stand-by assist  OT: Transfer: Supervision/stand-by assist (walk in shower transfer)  OT: Toilet Transfer/Toileting: Modified independent, toilet transfer, cleaning and garment management  OT: Cognitive: Patient/caregiver will verbalize understanding of cognitive assessment results/recommendations as needed for safe discharge planning     PT Predicted Duration/Target Date for Goal Attainment: 12/06/23  PT Frequency: Daily  PT: Bed Mobility: Supervision/stand-by assist  PT: Transfers: Supervision/stand-by assist  PT: Wheelchair Mobility: 150 feet, manual wheelchair, Caregiver SBA  PT: Goal 1: car transfer at supervision level  PT: Goal 2: pt will reclal 3 fall prevention methods for safe discharge home     SLP Predicted Duration/Target Date for Goal Attainment: 12/09/23  Therapy Frequency (SLP Eval): daily  SLP: Safely tolerate diet  without signs/symptoms of aspiration: Regular diet, Slightly thick liquids, With use of swallow precautions, With use of compensatory swallow strategies  SLP: Goal 1: Pt will demonstrate and initiate a plan with basic - mod complexity wiht 90% accuracy min cue s  SLP: Goal 2: pt will recall novel and functional information of mod complexity with min cues 90% accuracy     Nursing Goals  Bowel and Bladder care  Fall prevention   Medication Education  Skin Care protection   Goal: Mobility: Pt will be educated on how to use assistive device to improve mobility  Goal: Skin Integrity: Pt will be educated on proper weight shift,  turning and repositionig to prevent skin breakdown  Goal: Safety Management: Pt will be educated on how to use the call light for assistance evidenced by absence of fall     Gilberto Merritt DO  NPI# [id number]     Signature:

## 2023-11-29 NOTE — PLAN OF CARE
FOCUS/GOAL  Bowel management, Bladder management, Pain management, Wound care management, Mobility, Skin integrity, and Safety management    ASSESSMENT, INTERVENTIONS AND CONTINUING PLAN FOR GOAL:  Abdominal wound cleaned and new dressing per POC. L AKA wound intact with wound vac. Mild pain reported on L AKA site, scheduled pain medication was effective. No care concern at this time. Call light is in reach, urinal is in reach alarm is on.   Goal Outcome Evaluation:

## 2023-11-29 NOTE — PLAN OF CARE
Discharge Planner Post-Acute Rehab SLP:     Discharge Plan: home with assist,  SLP. ENT for further VF follow up     Precautions: fall, aspiration, sternal     Current Status:  Hearing: WFL  Vision: impaired; uses magnifying glass to read large print  Communication: aphonic. VF injection completed by ENT   Cognition: Mild cognitive impairment re: attention, memory, executive functions. Informal assessment completed as pt is limited by vision. Noting impaired cognition across other disciplines     Swallow: Regular, moderately thick (3) liquid. Set up assist meals, Fully upright all PO, small single sips/bites. Ok for ice chips via free water protocol (FWP)    Assessment: Pt instructed in odd one out; pt to identify which item does not belong. Pt required multiple repetition of words and 1x max cue to complete with 100% accuracy this day.  Pt instructed in word list retention task; 5 words read by clinician, pt to recall words while answering question. Pt required multiple repetition of words to answer questions with 100% accuracy.     Other Barriers to Discharge (Family Training, etc): family training: diet pending progress, IADL support

## 2023-11-29 NOTE — PROGRESS NOTES
Vascular Surgery Progress Note  11/29/2023       Subjective:  Working with PT, OT, SLP. Upgraded to mildly thick and thin liquids today. LLE with lateral small open segment along the incision line and large purulent, malodorous drainage. See uploaded pictures.afebrile, no leukocytosis, hemodynamically stable.     Objective:  Temp:  [97.7  F (36.5  C)] 97.7  F (36.5  C)  Pulse:  [100] 100  Resp:  [16] 16  BP: (122)/(78) 122/78  SpO2:  [98 %] 98 %    I/O last 3 completed shifts:  In: 120 [P.O.:120]  Out: 850 [Urine:850]      Gen: up in wheelchair, answering questions appropriately, whispering words, not in acute distress  CV: regular rate per radial pulse  Resp: non-labored, on room air   Abd: Abdominal incision with dressing, c/d/I.  Ext: RLE wwp, palpable DP pulse, LLE stump incision nonerythematous. Sutures and staples intact. With lateral small open segment along the incision line and large purulent, malodorous drainage. See uploaded pictures. Stump soft, without swelling.   Skin: Blanching erythema resolved.                    Labs:  Recent Labs   Lab 11/27/23  0617 11/23/23  1126   WBC 8.9 8.9   HGB 8.0* 8.1*   * 150       Recent Labs   Lab 11/27/23  0617 11/26/23  0826 11/25/23  0840 11/23/23  1126   *  --   --  135   POTASSIUM 4.3 4.3 4.5 4.9   CHLORIDE 102  --   --  101   CO2 22  --   --  23   BUN 54.3*  --   --  56.7*   CR 2.02*  --   --  2.01*   *  --   --  120*   OMAR 9.5  --   --  9.8   MAG 1.9 1.9 2.0 1.9   PHOS 5.0* 4.9* 4.8* 4.5      Assessment/Plan:   Alfredo Burnham is a 68 year old male with PMH of HTN and previous TIA who presented with R sided abdominal pain found to have contained ruptured AAA, now s/p open AAA repair 9/24 into 9/25am with 30 min supraceliac clamp time, prolonged inter-renal clamp time, temporary abdominal closure. He did have bloody stool postop with flex sig 9/25 demonstrating ischemic mucosal changes of the rectum, repeated abdominal without evidence of  transmural necrosis of the small or large intestine, or the rectum. On 9/29 he was started on CRRT and subsequently iHD. S/p closure of abdominal fascia and subcutaneous tissue wound VAC applied 10/2/23. 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 On 10/20/23 underwent abdominal incision closure.     Off iHD with renal recovery creat to 2 and appropriate UO. Transferred to ARU on 11/17/2023. SLP cleared him for thin liquids. Today, LLE with lateral small open segment along the incision line and large purulent, malodorous drainage. See uploaded pictures. Plan for incision and drainage of collection on LLE tomorrow.     -No prevena: please use dry dressing on LLE stump, keep site dry at all times.   Wound care on LLE stump: remove old dressing, clean site with iodine, reapply dry dressing followed by primapore on top.   -Hold Eliquis ffor now  -NPO after midnight, plan for OR tomorrow  -Transfer to [facility] for OR tomorrow, around 3-30PM. Pre-op orders in.  -Please do not hesitate to contact us immediately should questions or new bleeding episodes arise.    Of note:  - All abdominal staples to be removed in 2 weeks on 11/30/23 by vascular surgery, removed every other staple today 11/16/2023.  - Every other staple to be removed from stump in 2 weeks by vascular surgery (same time when all abdominal staples are removed on 11/30/23).  - Abd binder on at all times.    Discussed patient with Dr. Lowe.    Miryam Carrasco, SACHIN  Vascular Surgery  Pager: [phone]  [email or web address]  Send message or 10 digit call back number Securely via [employer] with the [employer] Web Console (learn more here)

## 2023-11-29 NOTE — PROGRESS NOTES
"  [facility]   Acute Rehabilitation Unit  Daily progress note    INTERVAL HISTORY  Alfredo Burnham was seen and examined at bedside this morning.  No acute events reported overnight.  He reports not having a very good night of sleep, worse than prior nights.  Had difficulty falling asleep, attributes to having a \"slow\" day yesterday.  Is having some leg pain but mild, overall well-managed.  He denies abdominal pain.  No recurrent nausea today or yesterday.  Has not had BM today, just gas.  Appetite is not great, but he feels his intake is similar to home.  Hopeful to advance to thinner liquids pending SLP recommendations.  He denies dizziness or lightheadedness, chest pain/tightness, shortness of breath.  Expresses some concern about cost of Eliquis at discharge.     With therapies, he is mod I for rolling, needing up to mod A for supine<>sit, SBA and set-up assist for slide board transfers with therapies, Ax1-2 with nursing.    MEDICATIONS   acetaminophen  975 mg Oral TID    amLODIPine  10 mg Oral Daily    apixaban ANTICOAGULANT  5 mg Oral BID    atorvastatin  10 mg Oral QPM    docusate sodium  50 mg Oral Daily    melatonin  3 mg Oral QPM    metoprolol tartrate  100 mg Oral BID    pantoprazole  40 mg Oral Q24H    psyllium  1 packet Oral Daily    QUEtiapine  75 mg Oral At Bedtime    sennosides  1 tablet Oral BID    Vitamin D3  50 mcg Oral Q24H    wound support modular  60 mL Oral Daily        acetaminophen, calcium carbonate, diphenhydrAMINE-zinc acetate, hydrOXYzine, menthol (Topical Analgesic) 2.5%, naloxone **OR** naloxone **OR** naloxone **OR** naloxone, ondansetron, oxyCODONE, - MEDICATION INSTRUCTIONS -, polyethylene glycol, QUEtiapine     PHYSICAL EXAM  /78 (BP Location: Right arm)   Pulse 100   Temp 97.7  F (36.5  C) (Oral)   Resp 16   Ht 1.702 m (5' 7\")   Wt 58.1 kg (128 lb 1.4 oz)   SpO2 98%   BMI 20.06 kg/m    Gen: NAD, lying in bed  HEENT: NC/AT, " MMM  Cardio: RRR, no murmurs  Pulm: non-labored on room air, lungs CTA bilaterally  Abd: soft, non-distended, non-tender, bowel sounds present  Ext: no edema in RLE, LLE with ace and vac in place  Neuro/MSK: awake, alert, right facial droop, anisocoria (L>R), hypophonia/dysphonia      LABS  CBC RESULTS:   Recent Labs   Lab Test 11/27/23  0617 11/23/23  1126 11/20/23  0617   WBC 8.9 8.9 7.1   RBC 2.58* 2.66* 2.55*   HGB 8.0* 8.1* 7.8*   HCT 24.7* 25.5* 24.4*   MCV 96 96 96   MCH 31.0 30.5 30.6   MCHC 32.4 31.8 32.0   RDW 15.6* 15.5* 15.7*   * 150 142*       Last Basic Metabolic Panel:  Recent Labs   Lab Test 11/27/23  0617 11/26/23  0826 11/25/23  0840 11/23/23  1126 11/21/23  0749 11/20/23  0617   *  --   --  135  --  133*   POTASSIUM 4.3 4.3 4.5 4.9  --  4.6   CHLORIDE 102  --   --  101  --  100   CO2 22  --   --  23  --  25   ANIONGAP 9  --   --  11  --  8   *  --   --  120* 104* 102*   BUN 54.3*  --   --  56.7*  --  58.7*   CR 2.02*  --   --  2.01*  --  1.96*   GFRESTIMATED 35*  --   --  35*  --  36*   OMAR 9.5  --   --  9.8  --  9.4       Rehabilitation - continue comprehensive acute inpatient rehabilitation program with multidisciplinary approach including therapies, rehab nursing, and physiatry following. See interval history for updates.      ASSESSMENT AND PLAN  Alfredo Burnham is a 68 year old  right hand dominant male with a past medical history of hypertension, TIA, 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, thrombocytopenia, and tobacco use disorder who was admitted on 9/24/23 with ruptured pararenal abdominal aortic aneurysm s/p open repair complicated by shock and colonic ischemic requiring multiple returns to OR for exploratory laparotomy, washout, and delayed closure with hospital course complicated by LLE ischemia ultimately 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, hematochezia with concern for possible bowel ischemia (no intramural ischemia on ex lap), MAYA requiring CRRT/HD (now off), acute hypoxic  respiratory failure requiring 2 separate intubations, PE/DVT, aspiration event, bilateral pleural effusions, acute blood loss anemia/thrombocytopenia (requiring multiple transfusions), multifocal embolic strokes, left psoas muscle and iliac hematomas, peripancreatic fluid collection and concern for possible pancreatitis, left vocal fold paralysis s/p injection 11/1, fevers, volume overload, delirium, tachycardia, exanthematous drug eruption/rash, malnutrition, dysphagia, hyperglycemia, loose stools, and multiple additional electrolyte abnormalities.  He is now admitted to ARU on 11/17/23 for multidisciplinary rehabilitation and ongoing medical management.        Admission to acute inpatient rehab 11/17/23.    Impairment group code: Amputation 05.3 Unilateral LE AKA - s/p left above knee amputation in setting of critical limb ischemia following contained AAA rupture s/p open AAA repair c/b strokes         PT, OT and SLP 60 minutes of each on a daily basis, in addition to rehab nursing and close management of physiatrist.       Impairment of ADL's: Noted to have  weakness, impaired balance,  baseline visual impairments (low vision), fatigue, impaired weight shifting, deconditioning, pain, new sternal and abdominal precautions, and LLE NWB status in setting of LLE AKA, all affecting his ability to safely and independently perform basic ADLs.  Goal for CGA or less with basic ADLs.     Impairment of mobility:  Noted to have  weakness, impaired balance,  baseline visual impairments (low vision), fatigue, impaired weight shifting, deconditioning, pain, new sternal and abdominal precautions, and LLE NWB status in setting of LLE AKA, all affecting his ability to safely and independently perform basic mobility.  Goal for CGA or less with basic transfers and likely mixed mobility vs wheelchair based.     Impairment of cognition/language/swallow:  Noted to have moderate to severe pharyngeal dysphagia and dysphonia with goals  for safe tolerance of least restrictive diet and improved cognitive-linguistic skills to meet basic needs.     Medical Conditions  New actions/orders/updates for today are in blue.     Acute critical LLE ischemia s/p L AKA on 10/17/23  Hematoma in LLE residual limb (10/25/23 CTA)  In setting of ruptured AAA as below  - Prevena vac in place at discharge to ARU.  However, vac was removed by vascular surgery on 11/18 due to concerns for bleeding/small hematoma and not conducive to hemostasis.  Assessed by vascular surgery 11/20, Prevena wound vac was replaced.  Appreciate assistance.  Plan to leave intact for 7 days, to be managed by vascular surgery.  Contact vascular surgery if any questions, concerns, or new bleeding episodes.  Leave LLE AKA wrapped with ACE.   - Vascular surgery planning to remove every other staple on 11/30  - Continue Expedite daily for wound healing  - Vascular surgery will follow weekly at ARU.  Per discussion with provider yesterday, will plan to see patient today to assess ongoing wound cares and suture removal.  - NWB LLE  - Pain management: Tylenol 975 mg TID scheduled + additional doses PRN, atarax 25 mg q6h PRN, oxycodone 5 mg q4h PRN, wean as able.  - Orthotist adjusted fit of [employer] limb protector 11/20 due to decreased swelling.  Appreciate assistance.  - Vascular surgery will follow 1-2x/week at ARU  - Continue PT/OT  - Follow up with vascular surgery   - Look into [facility], secondarily follow up with amputee clinic here     Ruptured pararenal AAA s/p open repair 9/24/23 c/b shock, colonic ischemia  S/p multiple exploratory laparotomy and washout (9/25, 9/26, 9/29), fascia closure 10/2 with delayed primary skin closure on 10/20  - Wound care: Apply iodeine/betadine, allow to dry.  Reapply clean dry dressing.  OK to shower, do not scrub/soak.  Every other staple removed by vascular surgery 11/16, remainder to be removed by vascular surgery on 11/30  -  "Activity restrictions: No lifting, pushing, or pulling greater than 10 pounds and no strenuous exercise for 4-6 weeks.  Given has been longer than this, asked vascular surgery about ability to gradually advance.  They will address when they visit.  - Abdominal binder on at all times  - Statin as below  - Pain management as above  - Vascular surgery will follow 1-2x/week at ARU  - Follow up with vascular surgery     Acute ischemic stroke of multiple vessel territory due to embolic stroke of undetermined source (ESUS)   Code stroke 10/6 for new R facial droop and anisocoria.  CT head was without acute stroke or bleed, CTA was without proximal large vessel occlusion but did reveal concern for pulmonary embolism and had a normal perfusion scan. He was not a candidate for thrombolysis based on being outside 4.5 hours conventional time window, multiple major surgeries, thrombocytopenia and concern for GI bleed. He was not a candidate for thrombectomy considering absence of proximal large vessel occlusion.  MRI with above findings.  - BP management as below  - Apixaban as below  - Continue atorvastatin 10 mg daily  - Follow up with stroke neurology in 4-6 weeks     PE (incidental finding on CTA head/neck 10/6/23 for stroke code)  DVT LLE (U/S 10/7/23)  Initially on Heparin drip, required multiple brief holds due to bleeding concerns as below.  Ultimately transitioned to DOAC on 11/15.  - Continue apixaban 5 mg BID  - Per discharge summary, \"he will need [facility] for cost effective co-pays, otherwise his co-pays will be very expensive.\"  See pharmacy liaison note on 11/13 for details.  Can get 1 month free at discharge pharmacy but future fills should be through [facility] to optimize cost.  - Monitor respiratory status, continue supplemental oxygen PRN to maintain O2 sats >92%     Left iliac hematoma (10/13/23 CT)  Left psoas muscle hematoma (10/24/23 CTA)  Stable on repeat imaging.  Could contribute to weakness.  - " Monitor Hgb as below and signs/symptoms of worsening bleeding     Anemia, multifactorial including acute blood loss, renal disease  Thrombocytopenia  Required multiple transfusions throughout course.  Hgb stable in 7-8s. Platelets 100-150s.  11/27: Hgb stable at 8.0; platelets stable at 143  - Monitor for evidence of recurrent bleeding  - Continue to trend CBC every M/Th     Tachycardia, suspect multifactorial including debility, pain, hydration status, PE  Hx hypertension  PTA on amlodipine 5 mg, benazepril 20 mg.  Held earlier this admission due to shock.  Restarted on amlodipine and added metoprolol due to persistent tachycardia.    - Continue amlodipine 10 mg daily  - Continue metoprolol tartrate 100 mg BID  - No ACE for now per vascular surgery  - Monitor BP/HR per unit routine     AMYA, improved  Renal emboli  Baseline Cr ~1.0.  CT with scattered areas of infarct related to emboli with AAA.  Cr rising after open AAA repair, peaked >5.  Started on CRRT 9/30, transitioned to HD on 10/5, last run on 10/27, HD line removed 11/2.  Cr most recently 1.9-2.1 range.  11/27: Cr stable at 2.02  - Avoid nephrotoxins as able  - Trend renal function every M/Th  - Follow up in CKD/post-MAYA clinic     Hyponatremia  Started around 11/9, has been in 132-134 range since then, but lower at 130 on 11/16-11/17.  11/27: Na stable at 133  - Trend BMP every M/Th  - Further work-up if persists     Left vocal fold paralysis/glottic insufficiency s/p laryngoscopy with injection laryngoplasty on 11/1  Dysphonia  Minimal Oropharyngeal Dysphagia   ENT consulted 10/28, fiberoptic endoscopy completed.  S/p injection 11/1.  - Continue SLP  - Regular diet; moderately thick (level 3) liquids. Ok for free water protocol per SLP.  Per SLP, benefits from set-up assist and intermittent supervision during meals, needs to be fully upright.  Ok to leave thickened liquids within patient's reach if upright in order to promote improved hydration.  - Repeat  VFSS on 11/28 with notable improvement per SLP.  Will evaluate at meal trial today to determine if further advances in liquids indicated.  - Follow up with ENT in 1 month (~12/1)     Delirium, multifactorial, improving  - Delirium precautions, sleep hygiene  - Continue seroquel 75 mg at HS (decreased 11/27).  Plan for ongoing wean as able.  - Continue melatonin 3 mg at 1900 (increased 11/27 with seroquel decrease  - Continue seroquel 25 mg BID PRN for agitation.     Adjustment disorder with depressed mood   Seen by psychology during acute hospital stay.  Not on medications.  - Team noting concerns for depressed mood.  Psychology consult placed, appreciate assistance.  See their note on 11/22 for details.  - Continue to monitor     Severe malnutrition in the context of acute illness/disease   Peripancreatic fluid collection on CT 10/13, concern possible pancreatitis, improved on repeat imaging  Previously on tube feeding, tube removed 11/13 per patient/family request. Juvencio counts with a lot of missing information, but reportedly improving intake.  Recent fecal elastase normalized, no ongoing need for Creon (concerns for possibly pancreatitis earlier this admission).   - Regular diet; moderately thick (level 3) liquid  - RD consulted, appreciate assistance  - Supplements per RD recs    Constipation  Nausea  No BM since 11/24-11/26.  On daily psyllium and senokot BID (though has not been taking PM dose).  Notes nausea on and off throughout admission.  Had overnight 11/26-11/27.  No abdominal pain, nausea resolved.  Did have BM x3 on 11/27-11/28 s/p suppository    - No recurrent nausea   - Encourage to utilize senokot BID as prescribed  - Added scheduled docusate daily (starting 11/27)  - Continue psyllium daily  - PRN Zofran for nausea  - Continue to monitor     Hx bilateral legal 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  - Noted       Adjustment to disability:  Clinical psychology to eval and treat if indicated  FEN: regular  diet; moderately thick (level 3) liquids.  Fully upright for all PO intake.  Bowel: continent, constipation as above  Bladder: continent.  Encourage double voiding, optimize positioning.    DVT Prophylaxis: on apixaban as above  GI Prophylaxis: PPI  Code: full code  Disposition: goal for home  ELOS:  target 12/6/23   Follow up Appointments on Discharge: PCP in 1-2 weeks, vascular surgery, stroke neurology in 4-6 weeks, ENT (~12/1), nephrology, PM&R in ~6 weeks ([facility])      I, Zofia Bray, spent a total of 40 minutes face-to-face or managing the care of Alfredo Burnham. Over 50% of my time on the unit was spent counseling the patient and coordinating care. See note for details.     Zofia Bray PA-C  Physical Medicine & Rehabilitation

## 2023-11-29 NOTE — PLAN OF CARE
Goal Outcome Evaluation:         Patient here S/P left AKA, alert and oriented, calls appropriately  Slept most of the night  No complained of pain, headache, chest pain, N&V, no SOB  Continent of Bladder, utilized urinal independently at night, no BM  Safety rounding checked completed, 3 side rails UP, bed alarm ON, call light/bedside table within reach  Plan of Care ongoing

## 2023-11-29 NOTE — PLAN OF CARE
Discharge Planner Post-Acute Rehab SLP:     Discharge Plan: home with assist,  SLP. ENT for further VF follow up     Precautions: fall, aspiration, sternal     Current Status:  Hearing: WFL  Vision: impaired; uses magnifying glass to read large print  Communication: aphonic. VF injection completed by ENT   Cognition: Mild cognitive impairment re: attention, memory, executive functions. Informal assessment completed as pt is limited by vision. Noting impaired cognition across other disciplines     Swallow: Regular, thin (0) liquid. Set up assist meals, Fully upright all PO, small single sips/bites. Ok for ice chips via free water protocol (FWP)    Assessment: Discussed VFSS results with patient and wife. Discussed plan for today's meal to trial mildly thick and thin liquids and advance as appropriate. Observed pt with mildly thick liquids, thin liquids, and regular solids. No overt s/sx of aspiration on any consistency today. Clear vocal quality following liquids and timely mastication/AP bolus transport with solids. Advanced diet to thin liquids and discussed plan for SLP to follow up with thin liquids tomorrow.     Other Barriers to Discharge (Family Training, etc): family training: diet pending progress, IADL support

## 2023-11-29 NOTE — PROGRESS NOTES
LIZBET [facility]  [facility]. [facility]ela confirmed the orders for an extended tub bench, a gait belt, and a commode were received - to check on the status SW would have to call the prosthetics department (Ph: [phone]). Adela confirmed pt does not have a PCP at the [facility] nor does he have medication coverage with the [facility]. Medications can be ordered through [place]. LIZBET updated PA.    LIZBET met with pt and his wife to provide amputation resources. Further discussed Medicare Part D and difficulty finding a plan that will cover pt's medications (Norvasc, Eliquis, Lipitor, and Lopressor are a few examples of medications that iona will not cover). Pt's wife shared that she called [employer], but was unable to get many answers. Pt and pt's wife shared their frustrations with LIZBET. LIZBET continued to research possible options for Part D plans.     LIZBET was notified EOD that pt will be readmitting to the hospital. LIZBET will follow up with pt and his wife re: Medicare Part D if/when pt returns to ARU.    SARIAH Guillory  Post Acute Float   ARU/[facility]    Phone: [phone]  Fax: [phone]

## 2023-11-29 NOTE — PLAN OF CARE
fDischarge Planner Post-Acute Rehab PT:      Discharge Plan: Home with spouse (accessible apt). Home care PT.     Precautions: L AKA: NWB LLE, abdominal, wound vac LLE, moderate thick liquids, visual deficits     Current Status: *Spouse ok to assist slideboard bed<>w/c only.   Bed Mobility: Jenny rolling, sup<>sit up to modA trunk assist  Transfer: SBA and setup assist w/ slideboard. Ax1-2 w/ nsg.   Gait: Unable, unsafe  Stairs: Unable, unsafe  Balance: Fair dynamic sitting. Requires CGA for quasistatic standing in // bars     Assessment: L limb rewrapped w/ spica technique, will follow up w/ pt to see if this allows for decreased skin irritation d/t wrap rolling. Despite multiple attempts to contact, no follow up received from [facility]. To ensure appropriate DME timely for discharge, K3 w/c ordered through [employer] and provided pt/spouse w/ DME handouts.      Other Barriers to Discharge (DME, Family Training, etc):   DME: No follow up from [facility] as of 11/29 (refaxed on 11/27).   K3 rental ordered through [employer] on 11/29.  Commode, tub bench, gait belt, slide board, bedrail. (Handouts for spouse on 11/29)    Family training: ongoing w/ spouse    Home measurements sheet provided 11/20.

## 2023-11-29 NOTE — PLAN OF CARE
FOCUS/GOAL  Medical management    ASSESSMENT, INTERVENTIONS AND CONTINUING PLAN FOR GOAL:  Patient at baseline. Denied pain. Wound vac working well. No concerns reported. Will continue with POC.  Goal Outcome Evaluation:      Plan of Care Reviewed With: patient    Overall Patient Progress: improvingOverall Patient Progress: improving    Outcome Evaluation: Patient  stable.

## 2023-11-29 NOTE — PROGRESS NOTES
Discharge Planner Post-Acute Rehab OT:      Discharge Plan: home with assist as needed and HH OT     Precautions: fall, abdominal, NWB of LLE, wound vac on LLE, moderate thick liquids     Current Status:  ADLs:  Mobility: Wc based, slide board Ax1, *wife cleared to assist bed<->w/c via slideboard, Nursing Assist 1 Via Slideboard, OT SB transfer bed to droparm padded commode w/ min A and cues for tech and commode to w/c w/ th placing sliding board and providing min  A and directives for technique  Grooming: IND WC based  Dressing: UB: IND, LB min A in supine  Bathing: Bp2rtyce slideboard to tub bench, min A for bathing seated  Toileting: slide board bed to commode or Wc to commode over the toilet with Ax1 in therapy, Ax2 with nursing, mod A for clothing management and total assist rear pericares  IADLs: Wife can assist  Vision/Cognition: baseline visual deficits as pt is legally blind, need to further assess cognition.     Assessment: SEssion focused on problem solving BR transfer when at home. Pt demo SBA for SB transfer bed <> w/c w/ VC x 1 for body positoning prior to placement of SB. Discussed BR setup and demo completion of SPT w/ GB. Session interuptted by MD who was unable to come back at another time for removal of staples. -15 minutes.     Other Barriers to Discharge (DME, Family Training, etc): Family training  [facility] through the [facility] including WC, drop arm commode, tub bench, pt needs a bedrail and slide board

## 2023-11-30 ENCOUNTER — HOSPITAL ENCOUNTER (INPATIENT)
Facility: CLINIC | Age: 68
End: 2023-11-30
Attending: PHYSICAL MEDICINE & REHABILITATION | Admitting: PHYSICAL MEDICINE & REHABILITATION
Payer: COMMERCIAL

## 2023-11-30 ENCOUNTER — DOCUMENTATION ONLY (OUTPATIENT)
Dept: VASCULAR SURGERY | Facility: CLINIC | Age: 68
End: 2023-11-30
Payer: COMMERCIAL

## 2023-11-30 LAB
ANION GAP SERPL CALCULATED.3IONS-SCNC: 7 MMOL/L (ref 7–15)
BUN SERPL-MCNC: 51.4 MG/DL (ref 8–23)
CALCIUM SERPL-MCNC: 9.5 MG/DL (ref 8.8–10.2)
CHLORIDE SERPL-SCNC: 102 MMOL/L (ref 98–107)
CREAT SERPL-MCNC: 2 MG/DL (ref 0.67–1.17)
DEPRECATED HCO3 PLAS-SCNC: 24 MMOL/L (ref 22–29)
EGFRCR SERPLBLD CKD-EPI 2021: 35 ML/MIN/1.73M2
ERYTHROCYTE [DISTWIDTH] IN BLOOD BY AUTOMATED COUNT: 15.3 % (ref 10–15)
GLUCOSE SERPL-MCNC: 101 MG/DL (ref 70–99)
HCT VFR BLD AUTO: 25.8 % (ref 40–53)
HGB BLD-MCNC: 8.3 G/DL (ref 13.3–17.7)
MAGNESIUM SERPL-MCNC: 1.9 MG/DL (ref 1.7–2.3)
MCH RBC QN AUTO: 30.4 PG (ref 26.5–33)
MCHC RBC AUTO-ENTMCNC: 32.2 G/DL (ref 31.5–36.5)
MCV RBC AUTO: 95 FL (ref 78–100)
PHOSPHATE SERPL-MCNC: 4.5 MG/DL (ref 2.5–4.5)
PLATELET # BLD AUTO: 130 10E3/UL (ref 150–450)
POTASSIUM SERPL-SCNC: 4.4 MMOL/L (ref 3.4–5.3)
RBC # BLD AUTO: 2.73 10E6/UL (ref 4.4–5.9)
SODIUM SERPL-SCNC: 133 MMOL/L (ref 135–145)
WBC # BLD AUTO: 6.7 10E3/UL (ref 4–11)

## 2023-11-30 PROCEDURE — 99232 SBSQ HOSP IP/OBS MODERATE 35: CPT | Mod: FS | Performed by: PHYSICIAN ASSISTANT

## 2023-11-30 PROCEDURE — 36415 COLL VENOUS BLD VENIPUNCTURE: CPT | Performed by: PHYSICIAN ASSISTANT

## 2023-11-30 PROCEDURE — 84100 ASSAY OF PHOSPHORUS: CPT | Performed by: PHYSICIAN ASSISTANT

## 2023-11-30 PROCEDURE — 250N000013 HC RX MED GY IP 250 OP 250 PS 637

## 2023-11-30 PROCEDURE — 80048 BASIC METABOLIC PNL TOTAL CA: CPT | Performed by: PHYSICIAN ASSISTANT

## 2023-11-30 PROCEDURE — 83735 ASSAY OF MAGNESIUM: CPT | Performed by: PHYSICIAN ASSISTANT

## 2023-11-30 PROCEDURE — 128N000003 HC R&B REHAB

## 2023-11-30 PROCEDURE — 85027 COMPLETE CBC AUTOMATED: CPT | Performed by: PHYSICIAN ASSISTANT

## 2023-11-30 PROCEDURE — 250N000013 HC RX MED GY IP 250 OP 250 PS 637: Performed by: PHYSICIAN ASSISTANT

## 2023-11-30 RX ORDER — CALCIUM CARBONATE 500 MG/1
500-1000 TABLET, CHEWABLE ORAL 2 TIMES DAILY PRN
Status: CANCELLED | OUTPATIENT
Start: 2023-11-30

## 2023-11-30 RX ORDER — LANOLIN ALCOHOL/MO/W.PET/CERES
3 CREAM (GRAM) TOPICAL EVERY EVENING
Status: CANCELLED | OUTPATIENT
Start: 2023-11-30

## 2023-11-30 RX ORDER — ONDANSETRON 4 MG/1
4 TABLET, ORALLY DISINTEGRATING ORAL EVERY 6 HOURS PRN
Status: CANCELLED | OUTPATIENT
Start: 2023-11-30

## 2023-11-30 RX ORDER — ACETAMINOPHEN 325 MG/1
975 TABLET ORAL 3 TIMES DAILY
Status: CANCELLED | OUTPATIENT
Start: 2023-11-30

## 2023-11-30 RX ORDER — SENNOSIDES 8.6 MG
1 TABLET ORAL 2 TIMES DAILY
Status: CANCELLED | OUTPATIENT
Start: 2023-11-30

## 2023-11-30 RX ORDER — AMLODIPINE BESYLATE 10 MG/1
10 TABLET ORAL DAILY
Status: CANCELLED | OUTPATIENT
Start: 2023-12-01

## 2023-11-30 RX ORDER — NALOXONE HYDROCHLORIDE 0.4 MG/ML
0.4 INJECTION, SOLUTION INTRAMUSCULAR; INTRAVENOUS; SUBCUTANEOUS
Status: CANCELLED | OUTPATIENT
Start: 2023-11-30

## 2023-11-30 RX ORDER — HYDROXYZINE HYDROCHLORIDE 25 MG/1
25 TABLET, FILM COATED ORAL EVERY 6 HOURS PRN
Status: CANCELLED | OUTPATIENT
Start: 2023-11-30

## 2023-11-30 RX ORDER — QUETIAPINE FUMARATE 25 MG/1
25 TABLET, FILM COATED ORAL 2 TIMES DAILY PRN
Status: CANCELLED | OUTPATIENT
Start: 2023-11-30

## 2023-11-30 RX ORDER — POLYETHYLENE GLYCOL 3350 17 G/17G
17 POWDER, FOR SOLUTION ORAL DAILY PRN
Status: CANCELLED | OUTPATIENT
Start: 2023-11-30

## 2023-11-30 RX ORDER — QUETIAPINE FUMARATE 25 MG/1
75 TABLET, FILM COATED ORAL AT BEDTIME
Status: CANCELLED | OUTPATIENT
Start: 2023-11-30

## 2023-11-30 RX ORDER — PANTOPRAZOLE SODIUM 40 MG/1
40 TABLET, DELAYED RELEASE ORAL EVERY 24 HOURS
Status: CANCELLED | OUTPATIENT
Start: 2023-11-30

## 2023-11-30 RX ORDER — ATORVASTATIN CALCIUM 10 MG/1
10 TABLET, FILM COATED ORAL EVERY EVENING
Status: CANCELLED | OUTPATIENT
Start: 2023-11-30

## 2023-11-30 RX ORDER — ACETAMINOPHEN 325 MG/1
650 TABLET ORAL EVERY 6 HOURS PRN
Status: CANCELLED | OUTPATIENT
Start: 2023-11-30

## 2023-11-30 RX ORDER — METOPROLOL TARTRATE 50 MG
100 TABLET ORAL 2 TIMES DAILY
Status: CANCELLED | OUTPATIENT
Start: 2023-11-30

## 2023-11-30 RX ORDER — NALOXONE HYDROCHLORIDE 0.4 MG/ML
0.2 INJECTION, SOLUTION INTRAMUSCULAR; INTRAVENOUS; SUBCUTANEOUS
Status: CANCELLED | OUTPATIENT
Start: 2023-11-30

## 2023-11-30 RX ORDER — OXYCODONE HYDROCHLORIDE 5 MG/1
5 TABLET ORAL EVERY 6 HOURS PRN
Status: CANCELLED | OUTPATIENT
Start: 2023-11-30

## 2023-11-30 RX ORDER — VITAMIN B COMPLEX
50 TABLET ORAL EVERY 24 HOURS
Status: CANCELLED | OUTPATIENT
Start: 2023-11-30

## 2023-11-30 RX ADMIN — METOPROLOL TARTRATE 100 MG: 50 TABLET, FILM COATED ORAL at 07:56

## 2023-11-30 RX ADMIN — PANTOPRAZOLE SODIUM 40 MG: 40 TABLET, DELAYED RELEASE ORAL at 16:21

## 2023-11-30 RX ADMIN — METOPROLOL TARTRATE 100 MG: 50 TABLET, FILM COATED ORAL at 19:41

## 2023-11-30 RX ADMIN — ACETAMINOPHEN 975 MG: 325 TABLET, FILM COATED ORAL at 07:55

## 2023-11-30 RX ADMIN — Medication 50 MCG: at 12:10

## 2023-11-30 RX ADMIN — Medication 3 MG: at 19:41

## 2023-11-30 RX ADMIN — SENNOSIDES 1 TABLET: 8.6 TABLET, FILM COATED ORAL at 19:41

## 2023-11-30 RX ADMIN — ATORVASTATIN CALCIUM 10 MG: 10 TABLET, FILM COATED ORAL at 19:41

## 2023-11-30 RX ADMIN — SENNOSIDES 1 TABLET: 8.6 TABLET, FILM COATED ORAL at 07:57

## 2023-11-30 RX ADMIN — ACETAMINOPHEN 975 MG: 325 TABLET, FILM COATED ORAL at 19:40

## 2023-11-30 RX ADMIN — QUETIAPINE 75 MG: 25 TABLET ORAL at 19:40

## 2023-11-30 RX ADMIN — ACETAMINOPHEN 975 MG: 325 TABLET, FILM COATED ORAL at 13:25

## 2023-11-30 RX ADMIN — AMLODIPINE BESYLATE 10 MG: 10 TABLET ORAL at 07:56

## 2023-11-30 RX ADMIN — DOCUSATE SODIUM 50 MG: 50 CAPSULE, LIQUID FILLED ORAL at 07:56

## 2023-11-30 ASSESSMENT — ACTIVITIES OF DAILY LIVING (ADL)
ADLS_ACUITY_SCORE: 35
ADLS_ACUITY_SCORE: 35
ADLS_ACUITY_SCORE: 34
ADLS_ACUITY_SCORE: 35
ADLS_ACUITY_SCORE: 34
ADLS_ACUITY_SCORE: 35
ADLS_ACUITY_SCORE: 34
ADLS_ACUITY_SCORE: 35
ADLS_ACUITY_SCORE: 34

## 2023-11-30 NOTE — PLAN OF CARE
Goal Outcome Evaluation:       Vascular removed lt AKA wound vac this shift, wound culture done. Drg changed once by nurse. NPO after midnight, transferring for surgery tomorrow. Wife present when doctor told patient. Abdominal incision, UTV, drg clean dry and intact. Cont x 2, legally blind. Slide board x 1 to w/c. Reg/thin, meds whole

## 2023-11-30 NOTE — PLAN OF CARE
Goal Outcome Evaluation:      Plan of Care Reviewed With: patient    Overall Patient Progress: no changeOverall Patient Progress: no change    Patient is alert and oriented. Able to use a call light and make his needs known. Assist of x 1 with a sliding board. Continent of BB, LBM 11/29. On reg diet this thin liquids, takes pills whole, but is currently NPO r/t pending procedure later today 11/30. Has a stump to the R BKA dressing in place and awaiting a procedure later today. Denies pain at this time. Is legally blind, Sternal/abdominal incision is healing well. Spouse would like to be notified asap when the time is knwon for the procedure. Call light within reach and nursing staff will continue with POC.

## 2023-11-30 NOTE — PROGRESS NOTES
"  [facility]   Acute Rehabilitation Unit  Daily progress note    INTERVAL HISTORY  Last evening, vascular surgery evaluated L AKA surgical site and findings concerning for potential wound infection.  Initiated transfer to [facility] with plan for OR for I&D this afternoon.  No bed has yet become available, so patient remains on the unit this morning.  Refer to discharge summary on 11/29 for further details.    Patient reports to be feeling well overall, though anxious and frustrated about need to return to OR.  He denies any increased pain in left leg.  Did take oxycodone last evening, slept well but had some weird dreams.  He denies any dizziness or lightheadedness, nausea, diarrhea, shortness of breath, fever/chills.  Reviewed updates from vascular surgery team.  Still waiting on definitive OR time.  May return to ARU vs brief admission to hospital post-op pending operative findings and how he feels/does afterwards.    MEDICATIONS   acetaminophen  975 mg Oral TID    amLODIPine  10 mg Oral Daily    [Held by provider] apixaban ANTICOAGULANT  5 mg Oral BID    atorvastatin  10 mg Oral QPM    docusate sodium  50 mg Oral Daily    melatonin  3 mg Oral QPM    metoprolol tartrate  100 mg Oral BID    pantoprazole  40 mg Oral Q24H    psyllium  1 packet Oral Daily    QUEtiapine  75 mg Oral At Bedtime    sennosides  1 tablet Oral BID    Vitamin D3  50 mcg Oral Q24H    wound support modular  60 mL Oral Daily        acetaminophen, calcium carbonate, diphenhydrAMINE-zinc acetate, hydrOXYzine, menthol (Topical Analgesic) 2.5%, naloxone **OR** naloxone **OR** naloxone **OR** naloxone, ondansetron, oxyCODONE, - MEDICATION INSTRUCTIONS -, polyethylene glycol, QUEtiapine     PHYSICAL EXAM  /84 (BP Location: Right arm, Patient Position: Supine, Cuff Size: Adult Regular)   Pulse 92   Temp 98.1  F (36.7  C) (Oral)   Resp 16   Ht 1.702 m (5' 7\")   Wt 58.1 kg (128 lb 1.4 oz)   SpO2 99%   BMI " 20.06 kg/m    Gen: NAD, lying in bed  HEENT: NC/AT, MMM  Cardio: RRR, no murmurs  Pulm: non-labored on room air, lungs CTA bilaterally  Abd: soft, non-distended, non-tender, bowel sounds present  Ext: no edema in RLE, LLE with dressing CDI, no visible strikethrough at all on dressing, mild edema in LLE, small area of erythema at medial upper thigh but not about incision, no warmth, no tenderness  Neuro/MSK: awake, alert, right facial droop, anisocoria (L>R), hypophonia/dysphonia      LABS  CBC RESULTS:   Recent Labs   Lab Test 11/30/23  0556 11/29/23  1934 11/27/23  0617   WBC 6.7 8.6 8.9   RBC 2.73* 2.53* 2.58*   HGB 8.3* 7.8* 8.0*   HCT 25.8* 24.1* 24.7*   MCV 95 95 96   MCH 30.4 30.8 31.0   MCHC 32.2 32.4 32.4   RDW 15.3* 15.2* 15.6*   * 157 143*       Last Basic Metabolic Panel:  Recent Labs   Lab Test 11/30/23  0556 11/27/23  0617 11/26/23  0826 11/25/23  0840 11/23/23  1126   * 133*  --   --  135   POTASSIUM 4.4 4.3 4.3   < > 4.9   CHLORIDE 102 102  --   --  101   CO2 24 22  --   --  23   ANIONGAP 7 9  --   --  11   * 116*  --   --  120*   BUN 51.4* 54.3*  --   --  56.7*   CR 2.00* 2.02*  --   --  2.01*   GFRESTIMATED 35* 35*  --   --  35*   OMAR 9.5 9.5  --   --  9.8    < > = values in this interval not displayed.       Rehabilitation - continue comprehensive acute inpatient rehabilitation program with multidisciplinary approach including therapies, rehab nursing, and physiatry following. See interval history for updates.      ASSESSMENT AND PLAN  Alfredo Burnham is a 68 year old  right hand dominant male with a past medical history of hypertension, TIA, 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, thrombocytopenia, and tobacco use disorder who was admitted on 9/24/23 with ruptured pararenal abdominal aortic aneurysm s/p open repair complicated by shock and colonic ischemic requiring multiple returns to OR for exploratory laparotomy, washout, and delayed closure with hospital course complicated  by LLE ischemia ultimately 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, hematochezia with concern for possible bowel ischemia (no intramural ischemia on ex lap), MAYA requiring CRRT/HD (now off), acute hypoxic respiratory failure requiring 2 separate intubations, PE/DVT, aspiration event, bilateral pleural effusions, acute blood loss anemia/thrombocytopenia (requiring multiple transfusions), multifocal embolic strokes, left psoas muscle and iliac hematomas, peripancreatic fluid collection and concern for possible pancreatitis, left vocal fold paralysis s/p injection 11/1, fevers, volume overload, delirium, tachycardia, exanthematous drug eruption/rash, malnutrition, dysphagia, hyperglycemia, loose stools, and multiple additional electrolyte abnormalities.  He is now admitted to ARU on 11/17/23 for multidisciplinary rehabilitation and ongoing medical management.        Admission to acute inpatient rehab 11/17/23.    Impairment group code: Amputation 05.3 Unilateral LE AKA - s/p left above knee amputation in setting of critical limb ischemia following contained AAA rupture s/p open AAA repair c/b strokes         PT, OT and SLP 60 minutes of each on a daily basis, in addition to rehab nursing and close management of physiatrist.       Impairment of ADL's: Noted to have  weakness, impaired balance,  baseline visual impairments (low vision), fatigue, impaired weight shifting, deconditioning, pain, new sternal and abdominal precautions, and LLE NWB status in setting of LLE AKA, all affecting his ability to safely and independently perform basic ADLs.  Goal for CGA or less with basic ADLs.     Impairment of mobility:  Noted to have  weakness, impaired balance,  baseline visual impairments (low vision), fatigue, impaired weight shifting, deconditioning, pain, new sternal and abdominal precautions, and LLE NWB status in setting of LLE AKA, all affecting his ability to safely and independently perform basic mobility.  Goal for CGA or less with  basic transfers and likely mixed mobility vs wheelchair based.     Impairment of cognition/language/swallow:  Noted to have moderate to severe pharyngeal dysphagia and dysphonia with goals for safe tolerance of least restrictive diet and improved cognitive-linguistic skills to meet basic needs.     Medical Conditions  New actions/orders/updates for today are in blue.     Concern for LLE wound infection  Evaluated by vascular surgery team on 11/29 PM for wound vac change. See their note for additional details.  In short, found that wound vac not suctioning (had been on exam 11/28) and surgical site with significant sanguinous and purulent-appearing material (vs coagulated blood products) expressed with pressure from small opening at incision.  Has not noted increased pain.  No signs of systemic illness - afebrile, WBC WNL, VSS.    - Wound and blood cultures in process - no growth yet  - Plan for OR this afternoon with vascular surgery for I&D  - NPO at midnight 11/30 in preparation for surgery  - Holding apixaban  - Will defer to vascular surgery re: dispo post-operatively, either to ARU or re-admit briefly pending procedure/course  - Monitor closely in meantime for any signs of clinical deterioration    Acute critical LLE ischemia s/p L AKA on 10/17/23  Hematoma in LLE residual limb (10/25/23 CTA)  In setting of ruptured AAA as below  - Prevena vac in place at discharge to ARU.  However, vac was removed by vascular surgery on 11/18 due to concerns for bleeding/small hematoma and not conducive to hemostasis.  Assessed by vascular surgery 11/20, Prevena wound vac was replaced.  Appreciate assistance.  Plan to leave intact for 7 days, to be managed by vascular surgery.  Contact vascular surgery if any questions, concerns, or new bleeding episodes.  Leave LLE AKA wrapped with ACE.   - As above, concerns for possible wound infection 11/29, plan to return to OR today.  Staples not removed per vascular surgery  - For now,  wound care with dry dressing, remove daily and PRN for drainage, clean site with iodine, reapply dry dressing followed by primapore.  Keep site dry at all times.  - Continue Expedite daily for wound healing  - NWB LLE  - Pain management: Tylenol 975 mg TID scheduled + additional doses PRN, atarax 25 mg q6h PRN, oxycodone 5 mg q4h PRN, wean as able.  - Continue PT/OT  - Follow up with vascular surgery   - [facility], secondarily follow up with [facility]putee clinic here     Ruptured pararenal AAA s/p open repair 9/24/23 c/b shock, colonic ischemia  S/p multiple exploratory laparotomy and washout (9/25, 9/26, 9/29), fascia closure 10/2 with delayed primary skin closure on 10/20  - Wound care: Apply iodeine/betadine, allow to dry.  Reapply clean dry dressing.  OK to shower, do not scrub/soak.  Every other staple removed by vascular surgery 11/16, remainder to be removed by vascular surgery on 11/30  - Activity restrictions: Per vascular surgery on 11/29, no ongoing lifting restrictions required.  - Abdominal binder on at all times  - Statin as below  - Pain management as above  - Follow up with vascular surgery     Acute ischemic stroke of multiple vessel territory due to embolic stroke of undetermined source (ESUS)   Code stroke 10/6 for new R facial droop and anisocoria.  CT head was without acute stroke or bleed, CTA was without proximal large vessel occlusion but did reveal concern for pulmonary embolism and had a normal perfusion scan. He was not a candidate for thrombolysis based on being outside 4.5 hours conventional time window, multiple major surgeries, thrombocytopenia and concern for GI bleed. He was not a candidate for thrombectomy considering absence of proximal large vessel occlusion.  MRI with above findings.  - BP management as below  - Apixaban currently held as below  - Continue atorvastatin 10 mg daily  - Follow up with stroke neurology in 4-6 weeks     PE (incidental  "finding on CTA head/neck 10/6/23 for stroke code)  DVT LLE (U/S 10/7/23)  Initially on Heparin drip, required multiple brief holds due to bleeding concerns as below.  Ultimately transitioned to DOAC on 11/15.  - Apixaban currently on hold for anticipated OR this afternoon  - Per discharge summary, \"[facility] will need [facility] for [facility] co[facility]pays, otherwise his co-pays will be very expensive.\"  See pharmacy liaison note on 11/13 for details.  Can get 1 month free at discharge pharmacy but future fills should be [facility] to [facility] cost.  - Monitor respiratory status, continue supplemental oxygen PRN to maintain O2 sats >92%     Left iliac hematoma (10/13/23 CT)  Left psoas muscle hematoma (10/24/23 CTA)  Stable on repeat imaging.  Could contribute to weakness.  - Monitor Hgb as below and signs/symptoms of worsening bleeding     Anemia, multifactorial including acute blood loss, renal disease  Thrombocytopenia  Required multiple transfusions throughout course.  Hgb stable in 7-8s. Platelets 100-150s.  - Increased bloody drainage from LLE surgical site on 11/29 when pressure applied by surgery team.  As of 11/30, Hgb stable at 8.3, platelets stable at 130  - Continue to trend CBC every M/Th     Tachycardia, suspect multifactorial including debility, pain, hydration status, PE  Hx hypertension  PTA on amlodipine 5 mg, benazepril 20 mg.  Held earlier this admission due to shock.  Restarted on amlodipine and added metoprolol due to persistent tachycardia.    - Continue amlodipine 10 mg daily  - Continue metoprolol tartrate 100 mg BID  - No ACE for now per vascular surgery  - Monitor BP/HR per unit routine     MAYA, improved  Renal emboli  Baseline Cr ~1.0.  CT with scattered areas of infarct related to emboli with AAA.  Cr rising after open AAA repair, peaked >5.  Started on CRRT 9/30, transitioned to HD on 10/5, last run on 10/27, HD line removed 11/2.  Cr most recently 1.9-2.1 range.  11/30: Cr stable " at 2.00  - Avoid nephrotoxins as able  - Trend renal function every M/Th  - Follow up in CKD/post-MAYA clinic     Hyponatremia  Started around 11/9, has been in 132-134 range since then, but lower at 130 on 11/16-11/17.  11/30: Na stable at 133  - Trend BMP every M/Th  - Further work-up if persists     Left vocal fold paralysis/glottic insufficiency s/p laryngoscopy with injection laryngoplasty on 11/1  Dysphonia  Minimal Oropharyngeal Dysphagia   ENT consulted 10/28, fiberoptic endoscopy completed.  S/p injection 11/1.  - Continue SLP  - Regular diet  - Repeat VFSS on 11/28 with notable improvement per SLP, advanced to thin liquids.  - Currently NPO for OR this afternoon  - Follow up with ENT in 1 month (~12/1)     Delirium, multifactorial, improving  - Delirium precautions, sleep hygiene  - Continue seroquel 75 mg at HS (decreased 11/27).  Plan for ongoing wean as able.  - Continue melatonin 3 mg at 1900 (increased 11/27 with seroquel decrease  - Continue seroquel 25 mg BID PRN for agitation.     Adjustment disorder with depressed mood   Seen by psychology during acute hospital stay.  Not on medications.  - Team noting concerns for depressed mood.  Psychology consult placed, appreciate assistance.  See their note on 11/22 for details.  - Continue to monitor     Severe malnutrition in the context of acute illness/disease   Peripancreatic fluid collection on CT 10/13, concern possible pancreatitis, improved on repeat imaging  Previously on tube feeding, tube removed 11/13 per patient/family request. Juvencio counts with a lot of missing information, but reportedly improving intake.  Recent fecal elastase normalized, no ongoing need for Creon (concerns for possibly pancreatitis earlier this admission).   - Regular diet; moderately thick (level 3) liquid  - RD consulted, appreciate assistance  - Supplements per RD recs    Constipation  Nausea  No BM since 11/24-11/26.  On daily psyllium and senokot BID (though has not been  taking PM dose).  Notes nausea on and off throughout admission.  Had overnight 11/26-11/27.  No abdominal pain, nausea resolved.  Did have BM x3 on 11/27-11/28 s/p suppository    - Encourage to utilize senokot BID as prescribed  - Added scheduled docusate daily (starting 11/27)  - Continue psyllium daily  - PRN Zofran for nausea  - Continue to monitor     Hx bilateral legal 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  - Noted       Adjustment to disability:  Clinical psychology to eval and treat if indicated  FEN: regular diet; thin liquids.  Fully upright for all PO intake.  Bowel: continent, constipation as above  Bladder: continent.  Encourage double voiding, optimize positioning.    DVT Prophylaxis: on apixaban as above  GI Prophylaxis: PPI  Code: full code  Disposition: OR - readmit to hospital vs return to ARU after pending course  ELOS: leaving to OR today  Follow up Appointments on Discharge: PCP in 1-2 weeks, vascular surgery, stroke neurology in 4-6 weeks, ENT (~12/1), nephrology, PM&R in ~6 weeks ([facility] or [facility] Pe[facility])      I, Zofia Bray, spent a total of 40 minutes face-to-face or managing the care of Alfredo Burnham. Over 50% of my time on the unit was spent counseling the patient and coordinating care. See note for details.     Plan of care was communicated to Dr. Gilberto Merritt, PM&R staff physician     Zofia Bray PA-C  Physical Medicine & Rehabilitation        Addendum: Due to error in OR scheduling, patient is now rescheduled for vascular surgery in OR tomorrow.  Will remain at ARU until pre-op.  Resumed regular diet, thin liquids.  Will make NPO again at midnight in preparation for OR tomorrow.  Continue to hold Eliquis.  Will await OR time from surgery team.

## 2023-11-30 NOTE — PLAN OF CARE
Occupational Therapy Discharge Summary    Reason for therapy discharge:    Change in medical status.    Progress towards therapy goal(s). See goals on Care Plan in [facility] electronic health record for goal details.  Goals not met.  Barriers to achieving goals:   discharge from facility.    Therapy recommendation(s):    Continued therapy is recommended.  Rationale/Recommendations:  Pt discharging back to the hospital per vascular surgery. Pt was making progress but limited by fatigue, deconditioning and cognition. Pt was working on slideboard transfers and pivot transfers, pt requires min A for LB dressing and mod A for toileting. [facility] the [facility] but the [facility] had followed up on status of DME which included a tub bench, platform drop arm commode and a WC among other items.Family training was to be scheduled for next week prior to his discharge later that week. Pt will benefit from continued therapy once medically able.

## 2023-11-30 NOTE — OP NOTE
Date of surgery: 25 September 2023 (note during prior admission)    Location: Operating room, [facility]    Surgeon: Dr Boucher    Assistant:   Dr Donald, vascular surgery resident  Dr. Roger Shirley , colorectal surgery attending for abdominal portion of case (please refer to his separate dictation)    Anesthesia: General anesthesia    Anesthesiologist: Staff anesthesia    Pre-Op diagnosis  Status post open repair of ruptured abdominal aortic aneurysm  Open abdomen  Shock requiring pressor support  Ischemic left leg    Postop diagnosis: Same      Procedure:  Exploratory laparotomy  Removal and exchange of quick clot packs  Open exposure of left below-knee popliteal artery  Embolectomy of left leg superficial femoral, popliteal and tibial arteries  Left leg 4 compartment fasciotomy    Indications: I am asked to cover this case by my colleague Dr. Kandace Mcleod, who performed an open repair of a ruptured abdominal aortic aneurysm that finished in the early hours of this morning.  At the time the patient was  noted to not have any arterial signals in the left foot however he was deemed too unstable at the time.  He has been resuscitated in the ICU and has relatively stabilized such that I was asked to perform embolectomy of the left lower extremity in the hopes of limb salvage.  I was also asked to inspect the open abdomen and to remove the quick clot packing and the colorectal surgeon was asked to attend due to concerns for colorectal ischemia.  Please see his dictated portion for evaluation of the bowel.  Please note that his left foot appeared dusky upon arrival in the OR.    Procedure: The patient was brought intubated from the ICU to the operating room placed in lithotomy position on the table.  We performed the ex lap first.  The dressing was removed and the abdomen prepped and draped in sterile fashion.  No immediate bleeding was encountered and the bowel was thoroughly evaluated  with no evidence of infarction.  Please refer to Dr. Shirley dictation for bowel evaluation.  The bypass graft was visualized and there was no bleeding from the proximal distal anastomosis.  6 quick clot packs were removed from around the graft and pelvis.  The nursing staff informed me that the record showed that 6 quick clot packs had been placed at the time of the original surgery.  There was no obvious surgical bleeding that required suture placement.  I placed 3 quick clot packs in the retroperitoneum adjacent to the graft.  Please note that saline irrigation was performed prior to doing this.  The bowel was then returned to the abdomen.  Wound VAC dressing was then placed.  The patient was then placed in supine position and the entirety of his left leg including groin was prepped and draped in sterile fashion.  New set of surgical instruments were used.  #10 blade was used to make an incision on the medial aspect of his leg approximately 2 fingerbreadths below the tibia.  Further dissection was with Metzenbaum scissors and Bovie electrocautery.  The fascia was breached and Bovie electrocautery used to take down the soleus muscle.  [place]heat [place] retractors were placed.  The popliteal vein was mobilized and retracted with a vessel loop such that the below-knee popliteal artery was dissected free with Vesseloops placed around this proximally and distally.  No pulsation was noted.  11 blade was used to make a transverse arteriotomy.  Thrombus was noted.  #3 Yobani embolectomy catheter was passage proximally into the SFA and large amount of clot was produced.  This was passage 1 further time at which point inflow was restored and no further clot was extruded.  Vessel loop was occluded to control the inflow.  #4 Yobani embolectomy catheter was passage distally and by estimation to the level of the ankle without resistance.  Clot was produced.  No backbleeding was noted.  Please note that 3000 units of IV heparin  was instilled via the arteriotomy incision rather than systemically.  The embolectomy catheter was passage for the few times however there was scant backbleeding.  Heparinized saline was injected.  The arteriotomy was then closed using interrupted 6-0 Prolene sutures with minimal backbleeding and forward flushing.  At the completion of this there was no Doppler signal in the foot.  I reopened the arteriotomy and noted that there was thrombus at the site and passaged  the embolectomy catheter proximally from which further thrombus was produced.  Yobani was passage distally and   thrombus was removed but again there was scant backbleeding.  The arteriotomy was again closed.  Again upon closing there was no Doppler signal noted at the foot.  I then reopened the arteriotomy site and repassaged the Yobani proximally producing thrombus proximally and distally with again restoration of flow and closed the arteriotomy site again with interrupted 6-0 Prolene suture after flushing with heparinized saline.  However the same result was produced.  I did not think it prudent to start injecting tPA locally given that he is a very fresh ruptured aneurysm repair.  Given the duskiness in the foot that was already apparent and the time of greater than 6 hours that had elapsed since the conclusion of his ruptured aneurysm repair, I did not think it prudent to proceed with further measures with angiographic evaluation.  On each occasion that arterial flow was restored this was not sustained likely due to the fact that his muscles were significantly ischemic such that there was now no venous return.  On the medial side his incision was extended for a short distance and Metzenbaum scissors used to breach the fascia subcutaneously to the ankle to complete medial side release.  A separate incision was made on the lateral side with a #10 blade and the fascia was breached with electric cautery and extended with Metzenbaum scissors.  On the  lateral side his muscles appeared ischemic and poorly responsive in intermittent patches to Bovie electrocautery.  No significant bleeding was noted.  Wet-to-dry dressings with wet Kerlix were placed on the medial and lateral side followed by 4 x 4 ABD pads Kerlix and Ace wrap for dressing.  No pedal signal was noted at the conclusion of the case in the left foot.  The patient was then transferred in critical condition back to the ICU

## 2023-11-30 NOTE — PROGRESS NOTES
"Speech Language Therapy Discharge Summary    Reason for therapy discharge:    Discharged to acute hospital per vascular surgery     Progress towards therapy goal(s). See goals on Care Plan in Epic electronic health record for goal details.  Goals not met.  Barriers to achieving goals:   discharge from facility.    Therapy recommendation(s):    Continued therapy is recommended.  Rationale/Recommendations:  Pt presents with improved but ongoing mild dysphagia. VFSS repeated 11/28, see below for details. Pt recently advanced to regular texture, thin (0) liquid just prior to discharge from rehab. Would benefit from ongoing SLP to follow up with diet and ensure safety. Pt also presents with at least mild-moderate cognitive impairments with deficits re: attention, short term and working memory, and executive function. Information measured informally via various verbal subtests as visual impairments limit ability for formal evaluation. ENT completed L VC injection during prior acute hospitalization. Ongoing dysphonia, pt reported no further interest in future injection. ENT planning to follow at OP. Recommend ongoing SLP for cognitive impairments and dysphagia.     VFSS 11/28:\"Pt seen for repeat VFSS this date. Pt presents with notable improved and now more mild dysphagia c/b by mildly delayed swallow initiation, inconsistent superior hyoid excursion, instances of incomplete epiglottic inversion, and vallecular residue. Impairments resulting in few instances of flash penetration with thin (0) across study. Recommend pt resume current diet. SLP to follow pt for meal trial tomorrow and determine if further liquid advancement is warranted pending clinical presentation and safety. Do suspect pt could advance to 0 liquid if appropriate at bedside when approved by SLP \" Pt was recently advanced to thin (0) liquid by SLP just prior to discharge from rehab.   "

## 2023-11-30 NOTE — PLAN OF CARE
Goal Outcome Evaluation:    Outcome Evaluation: Pt AxO, able to make needs known. VSS, denied pain. Has been on NPO since midnight in preparation for upcoming surgical procedure. Procedure cancelled today, rescheduled tomorrow. Pt was able to eat lunch, on regular texture, thin liquid diet. Able to take his pills whole in thin liquids. For NPO at midnight. A1 with the slide board to w/c, bed, or BSC and vice versa. Continent of bowel and bladder, able to use the urinal and bedside commode. Continue with POC.

## 2023-11-30 NOTE — PROGRESS NOTES
Surgery Scheduled    Pt's Rehab unit notified of surgery time.      Surgery/Procedure: I&D Left leg    Location: [facility]    Date: 11/30/23    Time: TBD - 330PM tentative time    Admission Type: Inpatient    Surgeon:  Dr. Lowe    OR Confirmed & :  Yes with [place] Control desk on 11/30/2023    Entered on provider calendar:  Yes    Post Op: TBD    Wound Vac Needed: No    Home Care Needed: No    Blood Thinners Addressed: N/A    Stress Test Clearance: NO

## 2023-12-01 ENCOUNTER — ANESTHESIA (OUTPATIENT)
Dept: SURGERY | Facility: CLINIC | Age: 68
DRG: 559 | End: 2023-12-01
Payer: COMMERCIAL

## 2023-12-01 ENCOUNTER — ANESTHESIA EVENT (OUTPATIENT)
Dept: SURGERY | Facility: CLINIC | Age: 68
DRG: 559 | End: 2023-12-01
Payer: COMMERCIAL

## 2023-12-01 ENCOUNTER — HOSPITAL ENCOUNTER (INPATIENT)
Facility: CLINIC | Age: 68
LOS: 18 days | Discharge: HOME | DRG: 464 | End: 2023-12-19
Attending: SURGERY | Admitting: SURGERY
Payer: COMMERCIAL

## 2023-12-01 ENCOUNTER — HOSPITAL ENCOUNTER (OUTPATIENT)
Facility: CLINIC | Age: 68
Discharge: STILL ADMITTED OR ADMINISTRATIVE | DRG: 464 | End: 2023-12-01
Attending: SURGERY | Admitting: SURGERY
Payer: COMMERCIAL

## 2023-12-01 VITALS
SYSTOLIC BLOOD PRESSURE: 119 MMHG | OXYGEN SATURATION: 99 % | HEART RATE: 92 BPM | DIASTOLIC BLOOD PRESSURE: 78 MMHG | RESPIRATION RATE: 17 BRPM | TEMPERATURE: 97.5 F | BODY MASS INDEX: 20.52 KG/M2 | WEIGHT: 130.73 LBS | HEIGHT: 67 IN

## 2023-12-01 DIAGNOSIS — N17.0 ACUTE KIDNEY FAILURE WITH TUBULAR NECROSIS (H): ICD-10-CM

## 2023-12-01 DIAGNOSIS — I10 HYPERTENSION, UNSPECIFIED TYPE: ICD-10-CM

## 2023-12-01 DIAGNOSIS — I71.33: ICD-10-CM

## 2023-12-01 DIAGNOSIS — I71.43 INFRARENAL ABDOMINAL AORTIC ANEURYSM (AAA) WITHOUT RUPTURE (H): ICD-10-CM

## 2023-12-01 DIAGNOSIS — Z89.612 S/P ABOVE KNEE AMPUTATION, LEFT (H): ICD-10-CM

## 2023-12-01 DIAGNOSIS — T81.42XA INFECTION FOLLOWING A PROCEDURE, DEEP INCISIONAL SURGICAL SITE, INITIAL ENCOUNTER: Primary | ICD-10-CM

## 2023-12-01 DIAGNOSIS — K59.00 CONSTIPATION, UNSPECIFIED CONSTIPATION TYPE: ICD-10-CM

## 2023-12-01 DIAGNOSIS — I10 PRIMARY HYPERTENSION: ICD-10-CM

## 2023-12-01 DIAGNOSIS — I70.222 CRITICAL LIMB ISCHEMIA OF LEFT LOWER EXTREMITY (H): ICD-10-CM

## 2023-12-01 DIAGNOSIS — G47.00 INSOMNIA, UNSPECIFIED TYPE: ICD-10-CM

## 2023-12-01 LAB
GRAM STAIN RESULT: NORMAL
GRAM STAIN RESULT: NORMAL

## 2023-12-01 PROCEDURE — 250N000011 HC RX IP 250 OP 636: Mod: JZ | Performed by: REGISTERED NURSE

## 2023-12-01 PROCEDURE — 99024 POSTOP FOLLOW-UP VISIT: CPT | Performed by: NURSE PRACTITIONER

## 2023-12-01 PROCEDURE — 120N000011 HC R&B TRANSPLANT UMMC

## 2023-12-01 PROCEDURE — 250N000025 HC SEVOFLURANE, PER MIN: Performed by: SURGERY

## 2023-12-01 PROCEDURE — 710N000010 HC RECOVERY PHASE 1, LEVEL 2, PER MIN: Performed by: SURGERY

## 2023-12-01 PROCEDURE — 0JBM0ZZ EXCISION OF LEFT UPPER LEG SUBCUTANEOUS TISSUE AND FASCIA, OPEN APPROACH: ICD-10-PCS | Performed by: SURGERY

## 2023-12-01 PROCEDURE — 250N000013 HC RX MED GY IP 250 OP 250 PS 637: Performed by: PHYSICIAN ASSISTANT

## 2023-12-01 PROCEDURE — 360N000075 HC SURGERY LEVEL 2, PER MIN: Performed by: SURGERY

## 2023-12-01 PROCEDURE — 272N000001 HC OR GENERAL SUPPLY STERILE: Performed by: SURGERY

## 2023-12-01 PROCEDURE — 11042 DBRDMT SUBQ TIS 1ST 20SQCM/<: CPT | Mod: 78 | Performed by: SURGERY

## 2023-12-01 PROCEDURE — 258N000003 HC RX IP 258 OP 636: Performed by: REGISTERED NURSE

## 2023-12-01 PROCEDURE — 250N000011 HC RX IP 250 OP 636: Performed by: NURSE PRACTITIONER

## 2023-12-01 PROCEDURE — 370N000017 HC ANESTHESIA TECHNICAL FEE, PER MIN: Performed by: SURGERY

## 2023-12-01 PROCEDURE — 87070 CULTURE OTHR SPECIMN AEROBIC: CPT | Performed by: SURGERY

## 2023-12-01 PROCEDURE — 999N000141 HC STATISTIC PRE-PROCEDURE NURSING ASSESSMENT: Performed by: SURGERY

## 2023-12-01 PROCEDURE — 87205 SMEAR GRAM STAIN: CPT | Performed by: SURGERY

## 2023-12-01 PROCEDURE — 250N000009 HC RX 250: Performed by: REGISTERED NURSE

## 2023-12-01 PROCEDURE — 87075 CULTR BACTERIA EXCEPT BLOOD: CPT | Performed by: SURGERY

## 2023-12-01 RX ORDER — FENTANYL CITRATE 50 UG/ML
50 INJECTION, SOLUTION INTRAMUSCULAR; INTRAVENOUS EVERY 5 MIN PRN
Status: DISCONTINUED | OUTPATIENT
Start: 2023-12-01 | End: 2023-12-01 | Stop reason: HOSPADM

## 2023-12-01 RX ORDER — CLINDAMYCIN PHOSPHATE 900 MG/50ML
900 INJECTION, SOLUTION INTRAVENOUS
Status: DISCONTINUED | OUTPATIENT
Start: 2023-12-01 | End: 2023-12-01

## 2023-12-01 RX ORDER — ONDANSETRON 4 MG/1
4 TABLET, ORALLY DISINTEGRATING ORAL EVERY 6 HOURS PRN
Status: CANCELLED | OUTPATIENT
Start: 2023-12-01

## 2023-12-01 RX ORDER — LIDOCAINE 40 MG/G
CREAM TOPICAL
Status: CANCELLED | OUTPATIENT
Start: 2023-12-01

## 2023-12-01 RX ORDER — FENTANYL CITRATE 50 UG/ML
25 INJECTION, SOLUTION INTRAMUSCULAR; INTRAVENOUS EVERY 5 MIN PRN
Status: DISCONTINUED | OUTPATIENT
Start: 2023-12-01 | End: 2023-12-01 | Stop reason: HOSPADM

## 2023-12-01 RX ORDER — CLINDAMYCIN PHOSPHATE 900 MG/50ML
900 INJECTION, SOLUTION INTRAVENOUS SEE ADMIN INSTRUCTIONS
Status: DISCONTINUED | OUTPATIENT
Start: 2023-12-01 | End: 2023-12-01

## 2023-12-01 RX ORDER — POLYETHYLENE GLYCOL 3350 17 G/17G
17 POWDER, FOR SOLUTION ORAL DAILY
Status: CANCELLED | OUTPATIENT
Start: 2023-12-02

## 2023-12-01 RX ORDER — ONDANSETRON 2 MG/ML
4 INJECTION INTRAMUSCULAR; INTRAVENOUS EVERY 30 MIN PRN
Status: DISCONTINUED | OUTPATIENT
Start: 2023-12-01 | End: 2023-12-01 | Stop reason: HOSPADM

## 2023-12-01 RX ORDER — METOPROLOL TARTRATE 50 MG
100 TABLET ORAL 2 TIMES DAILY
Status: DISCONTINUED | OUTPATIENT
Start: 2023-12-01 | End: 2023-12-19 | Stop reason: HOSPADM

## 2023-12-01 RX ORDER — ONDANSETRON 4 MG/1
4 TABLET, ORALLY DISINTEGRATING ORAL EVERY 6 HOURS PRN
Status: DISCONTINUED | OUTPATIENT
Start: 2023-12-01 | End: 2023-12-19 | Stop reason: HOSPADM

## 2023-12-01 RX ORDER — HYDROMORPHONE HCL IN WATER/PF 6 MG/30 ML
0.2 PATIENT CONTROLLED ANALGESIA SYRINGE INTRAVENOUS EVERY 5 MIN PRN
Status: DISCONTINUED | OUTPATIENT
Start: 2023-12-01 | End: 2023-12-01 | Stop reason: HOSPADM

## 2023-12-01 RX ORDER — FENTANYL CITRATE 50 UG/ML
INJECTION, SOLUTION INTRAMUSCULAR; INTRAVENOUS PRN
Status: DISCONTINUED | OUTPATIENT
Start: 2023-12-01 | End: 2023-12-01

## 2023-12-01 RX ORDER — NALOXONE HYDROCHLORIDE 0.4 MG/ML
0.2 INJECTION, SOLUTION INTRAMUSCULAR; INTRAVENOUS; SUBCUTANEOUS
Status: DISCONTINUED | OUTPATIENT
Start: 2023-12-01 | End: 2023-12-19 | Stop reason: HOSPADM

## 2023-12-01 RX ORDER — ACETAMINOPHEN 325 MG/1
975 TABLET ORAL EVERY 8 HOURS
Status: CANCELLED | OUTPATIENT
Start: 2023-12-01 | End: 2023-12-04

## 2023-12-01 RX ORDER — AMLODIPINE BESYLATE 10 MG/1
10 TABLET ORAL DAILY
Status: DISCONTINUED | OUTPATIENT
Start: 2023-12-02 | End: 2023-12-19 | Stop reason: HOSPADM

## 2023-12-01 RX ORDER — OXYCODONE HYDROCHLORIDE 5 MG/1
5 TABLET ORAL EVERY 4 HOURS PRN
Status: CANCELLED | OUTPATIENT
Start: 2023-12-01

## 2023-12-01 RX ORDER — BISACODYL 10 MG
10 SUPPOSITORY, RECTAL RECTAL DAILY PRN
Status: CANCELLED | OUTPATIENT
Start: 2023-12-01

## 2023-12-01 RX ORDER — QUETIAPINE FUMARATE 25 MG/1
75 TABLET, FILM COATED ORAL AT BEDTIME
Status: DISCONTINUED | OUTPATIENT
Start: 2023-12-01 | End: 2023-12-19 | Stop reason: HOSPADM

## 2023-12-01 RX ORDER — ACETAMINOPHEN 325 MG/1
650 TABLET ORAL EVERY 4 HOURS PRN
Status: CANCELLED | OUTPATIENT
Start: 2023-12-04

## 2023-12-01 RX ORDER — SODIUM CHLORIDE, SODIUM LACTATE, POTASSIUM CHLORIDE, CALCIUM CHLORIDE 600; 310; 30; 20 MG/100ML; MG/100ML; MG/100ML; MG/100ML
INJECTION, SOLUTION INTRAVENOUS CONTINUOUS PRN
Status: DISCONTINUED | OUTPATIENT
Start: 2023-12-01 | End: 2023-12-01

## 2023-12-01 RX ORDER — CLINDAMYCIN PHOSPHATE 900 MG/50ML
900 INJECTION, SOLUTION INTRAVENOUS SEE ADMIN INSTRUCTIONS
Status: DISCONTINUED | OUTPATIENT
Start: 2023-12-01 | End: 2023-12-01 | Stop reason: HOSPADM

## 2023-12-01 RX ORDER — NALOXONE HYDROCHLORIDE 0.4 MG/ML
0.4 INJECTION, SOLUTION INTRAMUSCULAR; INTRAVENOUS; SUBCUTANEOUS
Status: DISCONTINUED | OUTPATIENT
Start: 2023-12-01 | End: 2023-12-19 | Stop reason: HOSPADM

## 2023-12-01 RX ORDER — HYDROMORPHONE HCL IN WATER/PF 6 MG/30 ML
0.1 PATIENT CONTROLLED ANALGESIA SYRINGE INTRAVENOUS
Status: CANCELLED | OUTPATIENT
Start: 2023-12-01

## 2023-12-01 RX ORDER — CLINDAMYCIN PHOSPHATE 900 MG/50ML
900 INJECTION, SOLUTION INTRAVENOUS
Status: COMPLETED | OUTPATIENT
Start: 2023-12-01 | End: 2023-12-01

## 2023-12-01 RX ORDER — PROCHLORPERAZINE MALEATE 5 MG
5 TABLET ORAL EVERY 6 HOURS PRN
Status: CANCELLED | OUTPATIENT
Start: 2023-12-01

## 2023-12-01 RX ORDER — ONDANSETRON 2 MG/ML
INJECTION INTRAMUSCULAR; INTRAVENOUS PRN
Status: DISCONTINUED | OUTPATIENT
Start: 2023-12-01 | End: 2023-12-01

## 2023-12-01 RX ORDER — OXYCODONE HYDROCHLORIDE 5 MG/1
5 TABLET ORAL EVERY 6 HOURS PRN
Status: DISCONTINUED | OUTPATIENT
Start: 2023-12-01 | End: 2023-12-19 | Stop reason: HOSPADM

## 2023-12-01 RX ORDER — SENNOSIDES 8.6 MG
1 TABLET ORAL 2 TIMES DAILY
Status: DISCONTINUED | OUTPATIENT
Start: 2023-12-01 | End: 2023-12-04

## 2023-12-01 RX ORDER — SODIUM CHLORIDE, SODIUM LACTATE, POTASSIUM CHLORIDE, CALCIUM CHLORIDE 600; 310; 30; 20 MG/100ML; MG/100ML; MG/100ML; MG/100ML
INJECTION, SOLUTION INTRAVENOUS CONTINUOUS
Status: DISCONTINUED | OUTPATIENT
Start: 2023-12-01 | End: 2023-12-01 | Stop reason: HOSPADM

## 2023-12-01 RX ORDER — ONDANSETRON 4 MG/1
4 TABLET, ORALLY DISINTEGRATING ORAL EVERY 30 MIN PRN
Status: DISCONTINUED | OUTPATIENT
Start: 2023-12-01 | End: 2023-12-01 | Stop reason: HOSPADM

## 2023-12-01 RX ORDER — LIDOCAINE HYDROCHLORIDE 20 MG/ML
INJECTION, SOLUTION INFILTRATION; PERINEURAL PRN
Status: DISCONTINUED | OUTPATIENT
Start: 2023-12-01 | End: 2023-12-01

## 2023-12-01 RX ORDER — PANTOPRAZOLE SODIUM 40 MG/1
40 TABLET, DELAYED RELEASE ORAL EVERY 24 HOURS
Status: DISCONTINUED | OUTPATIENT
Start: 2023-12-01 | End: 2023-12-19 | Stop reason: HOSPADM

## 2023-12-01 RX ORDER — VITAMIN B COMPLEX
50 TABLET ORAL EVERY 24 HOURS
Status: DISCONTINUED | OUTPATIENT
Start: 2023-12-02 | End: 2023-12-19 | Stop reason: HOSPADM

## 2023-12-01 RX ORDER — ACETAMINOPHEN 325 MG/1
975 TABLET ORAL 3 TIMES DAILY
Status: DISCONTINUED | OUTPATIENT
Start: 2023-12-01 | End: 2023-12-19 | Stop reason: HOSPADM

## 2023-12-01 RX ORDER — ACETAMINOPHEN 325 MG/1
650 TABLET ORAL EVERY 6 HOURS PRN
Status: DISCONTINUED | OUTPATIENT
Start: 2023-12-01 | End: 2023-12-19 | Stop reason: HOSPADM

## 2023-12-01 RX ORDER — CALCIUM CARBONATE 500 MG/1
500-1000 TABLET, CHEWABLE ORAL 2 TIMES DAILY PRN
Status: DISCONTINUED | OUTPATIENT
Start: 2023-12-01 | End: 2023-12-19 | Stop reason: HOSPADM

## 2023-12-01 RX ORDER — ONDANSETRON 2 MG/ML
4 INJECTION INTRAMUSCULAR; INTRAVENOUS EVERY 6 HOURS PRN
Status: CANCELLED | OUTPATIENT
Start: 2023-12-01

## 2023-12-01 RX ORDER — QUETIAPINE FUMARATE 25 MG/1
25 TABLET, FILM COATED ORAL 2 TIMES DAILY PRN
Status: DISCONTINUED | OUTPATIENT
Start: 2023-12-01 | End: 2023-12-19 | Stop reason: HOSPADM

## 2023-12-01 RX ORDER — AMOXICILLIN 250 MG
1 CAPSULE ORAL 2 TIMES DAILY
Status: CANCELLED | OUTPATIENT
Start: 2023-12-01

## 2023-12-01 RX ORDER — LANOLIN ALCOHOL/MO/W.PET/CERES
3 CREAM (GRAM) TOPICAL EVERY EVENING
Status: DISCONTINUED | OUTPATIENT
Start: 2023-12-01 | End: 2023-12-19 | Stop reason: HOSPADM

## 2023-12-01 RX ORDER — HYDROMORPHONE HCL IN WATER/PF 6 MG/30 ML
0.2 PATIENT CONTROLLED ANALGESIA SYRINGE INTRAVENOUS
Status: CANCELLED | OUTPATIENT
Start: 2023-12-01

## 2023-12-01 RX ORDER — PROPOFOL 10 MG/ML
INJECTION, EMULSION INTRAVENOUS PRN
Status: DISCONTINUED | OUTPATIENT
Start: 2023-12-01 | End: 2023-12-01

## 2023-12-01 RX ORDER — ATORVASTATIN CALCIUM 10 MG/1
10 TABLET, FILM COATED ORAL EVERY EVENING
Status: DISCONTINUED | OUTPATIENT
Start: 2023-12-01 | End: 2023-12-19 | Stop reason: HOSPADM

## 2023-12-01 RX ORDER — HYDROXYZINE HYDROCHLORIDE 25 MG/1
25 TABLET, FILM COATED ORAL EVERY 6 HOURS PRN
Status: DISCONTINUED | OUTPATIENT
Start: 2023-12-01 | End: 2023-12-09

## 2023-12-01 RX ORDER — HYDROMORPHONE HCL IN WATER/PF 6 MG/30 ML
0.4 PATIENT CONTROLLED ANALGESIA SYRINGE INTRAVENOUS EVERY 5 MIN PRN
Status: DISCONTINUED | OUTPATIENT
Start: 2023-12-01 | End: 2023-12-01 | Stop reason: HOSPADM

## 2023-12-01 RX ORDER — POLYETHYLENE GLYCOL 3350 17 G/17G
17 POWDER, FOR SOLUTION ORAL DAILY PRN
Status: DISCONTINUED | OUTPATIENT
Start: 2023-12-01 | End: 2023-12-19 | Stop reason: HOSPADM

## 2023-12-01 RX ADMIN — ATORVASTATIN CALCIUM 10 MG: 10 TABLET, FILM COATED ORAL at 19:56

## 2023-12-01 RX ADMIN — PHENYLEPHRINE HYDROCHLORIDE 200 MCG: 10 INJECTION INTRAVENOUS at 11:52

## 2023-12-01 RX ADMIN — NOREPINEPHRINE BITARTRATE 6.4 MCG: 1 INJECTION, SOLUTION, CONCENTRATE INTRAVENOUS at 12:35

## 2023-12-01 RX ADMIN — SENNOSIDES 1 TABLET: 8.6 TABLET, FILM COATED ORAL at 19:56

## 2023-12-01 RX ADMIN — PROPOFOL 140 MG: 10 INJECTION, EMULSION INTRAVENOUS at 11:50

## 2023-12-01 RX ADMIN — ONDANSETRON 4 MG: 2 INJECTION INTRAMUSCULAR; INTRAVENOUS at 12:52

## 2023-12-01 RX ADMIN — Medication 60 ML: at 22:03

## 2023-12-01 RX ADMIN — LIDOCAINE HYDROCHLORIDE 100 MG: 20 INJECTION, SOLUTION INFILTRATION; PERINEURAL at 11:50

## 2023-12-01 RX ADMIN — METOPROLOL TARTRATE 100 MG: 50 TABLET, FILM COATED ORAL at 08:04

## 2023-12-01 RX ADMIN — PSYLLIUM HUSK 1 PACKET: 3.4 POWDER ORAL at 19:56

## 2023-12-01 RX ADMIN — MELATONIN 3 MG: at 19:56

## 2023-12-01 RX ADMIN — DOCUSATE SODIUM 50 MG: 50 CAPSULE, LIQUID FILLED ORAL at 19:56

## 2023-12-01 RX ADMIN — ACETAMINOPHEN 975 MG: 325 TABLET, FILM COATED ORAL at 19:56

## 2023-12-01 RX ADMIN — SODIUM CHLORIDE, POTASSIUM CHLORIDE, SODIUM LACTATE AND CALCIUM CHLORIDE: 600; 310; 30; 20 INJECTION, SOLUTION INTRAVENOUS at 11:39

## 2023-12-01 RX ADMIN — PHENYLEPHRINE HYDROCHLORIDE 0.7 MCG/KG/MIN: 10 INJECTION INTRAVENOUS at 12:14

## 2023-12-01 RX ADMIN — METOPROLOL TARTRATE 100 MG: 50 TABLET, FILM COATED ORAL at 19:56

## 2023-12-01 RX ADMIN — AMLODIPINE BESYLATE 10 MG: 10 TABLET ORAL at 08:04

## 2023-12-01 RX ADMIN — PANTOPRAZOLE SODIUM 40 MG: 40 TABLET, DELAYED RELEASE ORAL at 19:56

## 2023-12-01 RX ADMIN — PHENYLEPHRINE HYDROCHLORIDE 150 MCG: 10 INJECTION INTRAVENOUS at 12:06

## 2023-12-01 RX ADMIN — QUETIAPINE FUMARATE 75 MG: 25 TABLET ORAL at 19:55

## 2023-12-01 RX ADMIN — PHENYLEPHRINE HYDROCHLORIDE 200 MCG: 10 INJECTION INTRAVENOUS at 11:56

## 2023-12-01 RX ADMIN — NOREPINEPHRINE BITARTRATE 12.8 MCG: 1 INJECTION, SOLUTION, CONCENTRATE INTRAVENOUS at 12:12

## 2023-12-01 RX ADMIN — FENTANYL CITRATE 50 MCG: 50 INJECTION INTRAMUSCULAR; INTRAVENOUS at 12:26

## 2023-12-01 RX ADMIN — CLINDAMYCIN PHOSPHATE 900 MG: 900 INJECTION, SOLUTION INTRAVENOUS at 09:41

## 2023-12-01 ASSESSMENT — ACTIVITIES OF DAILY LIVING (ADL)
ADLS_ACUITY_SCORE: 35

## 2023-12-01 ASSESSMENT — LIFESTYLE VARIABLES: TOBACCO_USE: 1

## 2023-12-01 NOTE — ANESTHESIA POSTPROCEDURE EVALUATION
Patient: Alfredo Burnham    Procedure: Procedure(s):  irrigation and drainage of collection left above knee amputation stump       Anesthesia Type:  General    Note:  Disposition: Admission; Inpatient   Postop Pain Control: Uneventful            Sign Out: Well controlled pain   PONV: No   Neuro/Psych: Uneventful            Sign Out: Acceptable/Baseline neuro status   Airway/Respiratory: Uneventful            Sign Out: Acceptable/Baseline resp. status   CV/Hemodynamics: Uneventful            Sign Out: Acceptable CV status; No obvious hypovolemia; No obvious fluid overload   Other NRE: NONE   DID A NON-ROUTINE EVENT OCCUR? No           Last vitals:  Vitals Value Taken Time   /83 12/01/23 1330   Temp 36.6  C (97.8  F) 12/01/23 1312   Pulse 83 12/01/23 1340   Resp 16 12/01/23 1330   SpO2 100 % 12/01/23 1340   Vitals shown include unfiled device data.    Electronically Signed By: Bryon Nicole MD  December 1, 2023  1:41 PM

## 2023-12-01 NOTE — PROGRESS NOTES
"Post Op Check    12/01/2023    Alfredo Burnham is a 68 year old male with h/o Left AKA now POD#0 s/p left above-knee amputation stump washout/debridement/incision/drainage.    Pt reports feeling well, denies pain. Denies SOB, chest pain, or dizziness.    BP (!) 140/93 (Cuff Size: Adult Regular)   Pulse 107   Temp 97.7  F (36.5  C) (Oral)   Resp 18   Ht 1.702 m (5' 7\")   Wt 59.3 kg (130 lb 11.7 oz)   SpO2 99%   BMI 20.48 kg/m      Gen: A&O x3, NAD  Chest: breathing non-labored  Abdomen: soft, non-tender, non-distended  Incision: clean, dry, intact: stump incision with ACE wrap secured with primapore dressing, no strikethrough blood on ACE  Extremities: warm and well perfused    A/P: No acute post-op issues.  - Admit to inpatient  - Continue medications from rehab  - OOB with PT  - L AKA stump dressing to be changed by vascular surgery  - Hold Eliquis today, to be resumed over the weekend after discussing with attending   - Diet (see prior dietary/swallowing restrictions, with know dysphasia). Resume diet from ARU as recommended by SLP. Regular diet; moderately thick (level 3) liquid    Please call with any questions.    Miryam Carrasco CNP  Vascular Surgery  Pager: [phone]  [email or web address]  Send message or 10 digit call back number Securely via [employer] with the [employer] Web Console (learn more here)      "

## 2023-12-01 NOTE — ANESTHESIA PROCEDURE NOTES
Airway         Procedure Start/Stop Times: 12/1/2023 11:54 AM  Staff -        Anesthesiologist:  Zofia Hankins MD       CRNA: Daphne Davis APRN CRNA       Performed By: CRNA  Consent for Airway        Urgency: elective  Indications and Patient Condition       Indications for airway management: filemon-procedural       Induction type:intravenous       Mask difficulty assessment: 1 - vent by mask    Final Airway Details       Final airway type: supraglottic airway    Supraglottic Airway Details        Type: LMA       Brand: I-Gel       LMA size: 4    Post intubation assessment        Placement verified by: capnometry, equal breath sounds and chest rise        Number of attempts at approach: 1       Number of other approaches attempted: 0       Secured with: tape       Ease of procedure: easy       Dentition: Unchanged and Intact    Medication(s) Administered   Medication Administration Time: 12/1/2023 11:54 AM

## 2023-12-01 NOTE — PROGRESS NOTES
[place]  Vascular surgery attending on call  Patient seen and examined in the preop area with the patient's wife at bedside.    Patient awake and alert  Denies pain in his left AKA stump  Stable vital signs.    11/30 cbc and chem 7 result noted.     Consent obtained for left above-knee amputation stump washout/debridement/incision/drainage and possible AKA stump revision. (Asked to cover case by my colleague Dr Lowe).    Note negative organisms no gram stain day 1.  Negative blood cultures on day 1.

## 2023-12-01 NOTE — DISCHARGE SUMMARY
[facility]   Acute Rehabilitation Unit  Discharge summary     Date of Admission: 11/17/2023  Date of Discharge: 12/1/2023  Disposition: [place] for vascular surgery procedure  Primary Care Physician: Yuki Wright  Attending physician: Gilberto Merritt DO      DISCHARGE DIAGNOSIS    Possible wound infection, LLE  Acute critical LLE ischemia s/p L AKA on 10/17/23 c/b hematoma in residual limb   Ruptured pararenal AAA s/p open repair 9/24/23   Acute ischemic stroke of multiple vessel territory  PE/DVT  Left iliac and psoas muscle hematoma  Anemia, thrombocytopenia  Hypertension  Left vocal fold paralysis s/p injection 11/1, ongoing dysphonia  Minimal oropharyngeal dysphagia  Adjustment disorder with depressed mood  Severe malnutrition  Constipation  Hx bilateral legal 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        BRIEF SUMMARY  Alfredo Burnham is a 68 year old right hand dominant male with a past medical history of hypertension, TIA, 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, thrombocytopenia, and tobacco use disorder who was admitted on 9/24/23 with ruptured pararenal abdominal aortic aneurysm s/p open repair complicated by shock and colonic ischemic requiring multiple returns to OR for exploratory laparotomy, washout, and delayed closure with hospital course complicated by LLE ischemia ultimately 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, hematochezia with concern for possible bowel ischemia (no intramural ischemia on ex lap), MAYA requiring CRRT/HD (now off), acute hypoxic respiratory failure requiring 2 separate intubations, PE/DVT, aspiration event, bilateral pleural effusions, acute blood loss anemia/thrombocytopenia (requiring multiple transfusions), multifocal embolic strokes, left psoas muscle and iliac hematomas, peripancreatic fluid collection and concern for possible pancreatitis, left vocal fold paralysis s/p injection 11/1, fevers, volume overload, delirium,  tachycardia, exanthematous drug eruption/rash, malnutrition, dysphagia, hyperglycemia, loose stools, and multiple additional electrolyte abnormalities.  He was admitted to ARU on 11/17/23 for multidisciplinary rehabilitation and ongoing medical management.     Patient had been making good functional gains with therapies at ARU. Pain has been well-managed, no signs of systemic illness.  Vascular surgery has been following for management of wound vac.  Upon their evaluation on 11/29, concerns for potential wound infection as below and have recommended return to OR for further exploration/washout.  He was discharged from ARU early morning on 12/1 in advance of that procedure, per surgeon plans to admit for observation afterwards.  Patient was not seen on this date due to early morning departure.  See prior notes for most recent exam findings.    REHABILITATION COURSE  PT: Discharged to acute hospital per vascular surgery     Current Status: *Spouse ok to assist slideboard bed<>w/c only.   Bed Mobility: Jenny rolling, sup<>sit up to modA trunk assist  Transfer: SBA and setup assist w/ slideboard. Ax1-2 w/ nsg.   Gait: Unable, unsafe  Stairs: Unable, unsafe  Balance: Fair dynamic sitting. Requires CGA for quasistatic standing in // bars    OT: Discharged to acute hospital per vascular surgery     Progress towards therapy goal(s). See goals on Care Plan in Epic electronic health record for goal details.  Goals not met.  Barriers to achieving goals:   discharge from facility.     Therapy recommendation(s):    Continued therapy is recommended.  Rationale/Recommendations:  Pt discharging back to the hospital per vascular surgery. Pt was making progress but limited by fatigue, deconditioning and cognition. Pt was working on slideboard transfers and pivot transfers, pt requires min A for LB dressing and mod A for toileting. [facility] but the [facility] had followed up on status of DME which included a tub  "bench, platform drop arm commode and a WC among other items.Family training was to be scheduled for next week prior to his discharge later that week. Pt will benefit from continued therapy once medically able.    SLP: Discharged to acute hospital per vascular surgery      Progress towards therapy goal(s). See goals on Care Plan in [facility] electronic health record for goal details.  Goals not met.  Barriers to achieving goals:   discharge from facility.     Therapy recommendation(s):    Continued therapy is recommended.  Rationale/Recommendations:  Pt presents with improved but ongoing mild dysphagia. VFSS repeated 11/28, see below for details. Pt recently advanced to regular texture, thin (0) liquid just prior to discharge from rehab. Would benefit from ongoing SLP to follow up with diet and ensure safety. Pt also presents with at least mild-moderate cognitive impairments with deficits re: attention, short term and working memory, and executive function. Information measured informally via various verbal subtests as visual impairments limit ability for formal evaluation. ENT completed L VC injection during prior acute hospitalization. Ongoing dysphonia, pt reported no further interest in future injection. ENT planning to follow at OP. Recommend ongoing SLP for cognitive impairments and dysphagia.      VFSS 11/28:\"Pt seen for repeat VFSS this date. Pt presents with notable improved and now more mild dysphagia c/b by mildly delayed swallow initiation, inconsistent superior hyoid excursion, instances of incomplete epiglottic inversion, and vallecular residue. Impairments resulting in few instances of flash penetration with thin (0) across study. Recommend pt resume current diet. SLP to follow pt for meal trial tomorrow and determine if further liquid advancement is warranted pending clinical presentation and safety. Do suspect pt could advance to 0 liquid if appropriate at bedside when approved by SLP \" Pt was recently " advanced to thin (0) liquid by SLP just prior to discharge from rehab.     MEDICAL COURSE    Concern for LLE wound infection  Evaluated by vascular surgery team on 11/29 PM for wound vac change. See their note for additional details.  In short, found that wound vac not suctioning (had been on exam 11/28) and surgical site with significant sanguinous and purulent-appearing material (vs coagulated blood products) expressed with pressure from small opening at incision.  Has not noted increased pain.  No signs of systemic illness - afebrile, WBC WNL, VSS.    - Wound cultures in process - no growth yet  - Blood cultures remain in process - no growth after 1 day  - Plan for OR this morning with vascular surgery for I&D  - NPO at midnight 12/1 in preparation for surgery  - Holding apixaban  - Per vascular surgery, patient to be admitted to [place] following procedure for further monitoring.  Would anticipate return to ARU once medically stable and able to tolerate therapy intensity.      Acute critical LLE ischemia s/p L AKA on 10/17/23  Hematoma in LLE residual limb (10/25/23 CTA)  In setting of ruptured AAA as below  - Prevena vac in place at discharge to ARU.  However, vac was removed by vascular surgery on 11/18 due to concerns for bleeding/small hematoma and not conducive to hemostasis.  Assessed by vascular surgery 11/20, Prevena wound vac was replaced.  Appreciate assistance.  Plan to leave intact for 7 days, to be managed by vascular surgery.  Contact vascular surgery if any questions, concerns, or new bleeding episodes.  Leave LLE AKA wrapped with ACE.   - As above, concerns for possible wound infection 11/29, plan to return to OR now 12/1.  Staples not removed per vascular surgery  - For now, wound care with dry dressing, remove daily and PRN for drainage, clean site with iodine, reapply dry dressing followed by primapore.  Keep site dry at all times.  - Continue Expedite daily for wound healing  - NWB LLE  -  Pain management: Tylenol 975 mg TID scheduled + additional doses PRN, atarax 25 mg q6h PRN, oxycodone 5 mg q4h PRN, wean as able.  - Continue PT/OT  - Follow up with vascular surgery   - [facility]     Ruptured pararenal AAA s/p open repair 9/24/23 c/b shock, colonic ischemia  S/p multiple exploratory laparotomy and washout (9/25, 9/26, 9/29), fascia closure 10/2 with delayed primary skin closure on 10/20  - Wound care: Apply iodeine/betadine, allow to dry.  Reapply clean dry dressing.  OK to shower, do not scrub/soak.  Every other staple removed by vascular surgery 11/16, remainder to be removed by vascular surgery on 11/30  - Activity restrictions: Per vascular surgery on 11/29, no ongoing lifting restrictions required.  - Abdominal binder on at all times  - Statin as below  - Pain management as above  - Follow up with vascular surgery     Acute ischemic stroke of multiple vessel territory due to embolic stroke of undetermined source (ESUS)   Code stroke 10/6 for new R facial droop and anisocoria.  CT head was without acute stroke or bleed, CTA was without proximal large vessel occlusion but did reveal concern for pulmonary embolism and had a normal perfusion scan. He was not a candidate for thrombolysis based on being outside 4.5 hours conventional time window, multiple major surgeries, thrombocytopenia and concern for GI bleed. He was not a candidate for thrombectomy considering absence of proximal large vessel occlusion.  MRI with above findings.  - BP management as below  - Apixaban currently held as below  - Continue atorvastatin 10 mg daily  - Follow up with stroke neurology in 4-6 weeks     PE (incidental finding on CTA head/neck 10/6/23 for stroke code)  DVT LLE (U/S 10/7/23)  Initially on Heparin drip, required multiple brief holds due to bleeding concerns as below.  Ultimately transitioned to DOAC on 11/15.  - Apixaban  "currently on hold for anticipated OR today  - Per discharge summary, \"he will need [facility] management for cost effective co-pays, otherwise his co-pays will be very expensive.\"  See pharmacy liaison note on 11/13 for details.  Can get 1 month free at discharge pharmacy but future fills should be through [facility] pharmacy to optimize cost.  - Monitor respiratory status, continue supplemental oxygen PRN to maintain O2 sats >92%     Left iliac hematoma (10/13/23 CT)  Left psoas muscle hematoma (10/24/23 CTA)  Stable on repeat imaging.  Could contribute to weakness.  - Monitor Hgb as below and signs/symptoms of worsening bleeding     Anemia, multifactorial including acute blood loss, renal disease  Thrombocytopenia  Required multiple transfusions throughout course.  Hgb stable in 7-8s. Platelets 100-150s.  - Increased bloody drainage from LLE surgical site on 11/29 when pressure applied by surgery team.  As of 11/30, Hgb stable at 8.3, platelets stable at 130  - Continue to trend CBC every M/Th     Tachycardia, suspect multifactorial including debility, pain, hydration status, PE  Hx hypertension  PTA on amlodipine 5 mg, benazepril 20 mg.  Held earlier this admission due to shock.  Restarted on amlodipine and added metoprolol due to persistent tachycardia.    - Continue amlodipine 10 mg daily  - Continue metoprolol tartrate 100 mg BID  - No ACE for now per vascular surgery  - Monitor BP/HR per unit routine     MAYA, improved  Renal emboli  Baseline Cr ~1.0.  CT with scattered areas of infarct related to emboli with AAA.  Cr rising after open AAA repair, peaked >5.  Started on CRRT 9/30, transitioned to HD on 10/5, last run on 10/27, HD line removed 11/2.  Cr most recently 1.9-2.1 range.  11/30: Cr stable at 2.00  - Avoid nephrotoxins as able  - Trend renal function every M/Th  - Follow up in CKD/post-MAYA clinic     Hyponatremia  Started around 11/9, has been in 132-134 range since then, but lower at 130 on 11/16-11/17.  11/30: Na " stable at 133  - Trend BMP every M/Th  - Further work-up if persists     Left vocal fold paralysis/glottic insufficiency s/p laryngoscopy with injection laryngoplasty on 11/1  Dysphonia  Minimal Oropharyngeal Dysphagia   ENT consulted 10/28, fiberoptic endoscopy completed.  S/p injection 11/1.  - Continue SLP  - Regular diet  - Repeat VFSS on 11/28 with notable improvement per SLP, advanced to thin liquids.  - Currently NPO for OR today  - Follow up with ENT in 1 month (~12/1)     Delirium, multifactorial, improving  - Delirium precautions, sleep hygiene  - Continue seroquel 75 mg at HS (decreased 11/27).  Plan for ongoing wean as able.  - Continue melatonin 3 mg at 1900 (increased 11/27 with seroquel decrease)     Adjustment disorder with depressed mood   Seen by psychology during acute hospital stay.  Not on medications.  - Team noting concerns for depressed mood.  Psychology consult placed, appreciate assistance.  See their note on 11/22 for details.  - Continue to monitor     Severe malnutrition in the context of acute illness/disease   Peripancreatic fluid collection on CT 10/13, concern possible pancreatitis, improved on repeat imaging  Previously on tube feeding, tube removed 11/13 per patient/family request. Juvencio counts with a lot of missing information, but reportedly improving intake.  Recent fecal elastase normalized, no ongoing need for Creon (concerns for possibly pancreatitis earlier this admission).   - Regular diet; thin liquids  - RD consulted, appreciate assistance  - Supplements per RD recs     Constipation  Nausea  No BM since 11/24-11/26.  On daily psyllium and senokot BID (though has not been taking PM dose).  Notes nausea on and off throughout admission.  Had overnight 11/26-11/27.  No abdominal pain, nausea resolved.  Did have BM x3 on 11/27-11/28 s/p suppository. No recurrent nausea.  - Encourage to utilize senokot BID as prescribed  - Added scheduled docusate daily (starting 11/27)  -  Continue psyllium daily  - PRN Zofran for nausea  - Continue to monitor     Hx bilateral legal 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  - Noted    DISCHARGE MEDICATIONS  Current Discharge Medication List        START taking these medications    Details   docusate sodium (COLACE) 50 MG capsule Take 1 capsule (50 mg) by mouth daily    Associated Diagnoses: Constipation, unspecified constipation type      ondansetron (ZOFRAN ODT) 4 MG ODT tab Take 1 tablet (4 mg) by mouth every 6 hours as needed for nausea or vomiting    Associated Diagnoses: S/P above knee amputation, left (H)      polyethylene glycol (MIRALAX) 17 g packet Take 17 g by mouth daily as needed for constipation    Associated Diagnoses: Constipation, unspecified constipation type           CONTINUE these medications which have CHANGED    Details   !! acetaminophen (TYLENOL) 325 MG tablet Take 2 tablets (650 mg) by mouth every 6 hours as needed for fever    Associated Diagnoses: S/P above knee amputation, left (H)      !! acetaminophen (TYLENOL) 325 MG tablet Take 3 tablets (975 mg) by mouth 3 times daily    Associated Diagnoses: S/P above knee amputation, left (H)      calcium carbonate (TUMS) 500 MG chewable tablet Take 1 tablet (500 mg) by mouth 2 times daily as needed for heartburn    Associated Diagnoses: Infrarenal abdominal aortic aneurysm, ruptured (H)      melatonin 3 MG tablet Take 1 tablet (3 mg) by mouth every evening    Associated Diagnoses: Insomnia, unspecified type      metoprolol tartrate (LOPRESSOR) 100 MG tablet Take 1 tablet (100 mg) by mouth 2 times daily    Associated Diagnoses: Primary hypertension      oxyCODONE (ROXICODONE) 5 MG tablet 1 tablet (5 mg) by Oral or Feeding Tube route every 6 hours as needed for moderate pain or severe pain    Associated Diagnoses: Infrarenal abdominal aortic aneurysm, ruptured (H); Hypertension, unspecified type; Critical limb ischemia of left lower extremity (H); Infrarenal abdominal aortic aneurysm (AAA)  without rupture (H24); Acute kidney failure with tubular necrosis (H24)      pantoprazole (PROTONIX) 40 MG EC tablet Take 1 tablet (40 mg) by mouth every 24 hours    Associated Diagnoses: Infrarenal abdominal aortic aneurysm, ruptured (H)      psyllium (METAMUCIL/KONSYL) Packet Take 1 packet by mouth daily    Associated Diagnoses: Constipation, unspecified constipation type      QUEtiapine (SEROQUEL) 25 MG tablet Take 3 tablets (75 mg) by mouth at bedtime    Associated Diagnoses: Insomnia, unspecified type      sennosides (SENOKOT) 8.6 MG tablet Take 1 tablet by mouth 2 times daily    Associated Diagnoses: Constipation, unspecified constipation type      Vitamin D3 (CHOLECALCIFEROL) 25 mcg (1000 units) tablet Take 2 tablets (50 mcg) by mouth every 24 hours    Associated Diagnoses: Infrarenal abdominal aortic aneurysm, ruptured (H)      wound support modular (EXPEDITE) LIQD cup Take 60 mLs by mouth daily    Associated Diagnoses: S/P above knee amputation, left (H)       !! - Potential duplicate medications found. Please discuss with provider.        CONTINUE these medications which have NOT CHANGED    Details   amLODIPine (NORVASC) 10 MG tablet Take 1 tablet (10 mg) by mouth daily    Associated Diagnoses: Infrarenal abdominal aortic aneurysm, ruptured (H); Hypertension, unspecified type; Critical limb ischemia of left lower extremity (H); Infrarenal abdominal aortic aneurysm (AAA) without rupture (H24); Acute kidney failure with tubular necrosis (H24)      atorvastatin (LIPITOR) 10 MG tablet Take 1 tablet (10 mg) by mouth every evening    Associated Diagnoses: Infrarenal abdominal aortic aneurysm, ruptured (H); Hypertension, unspecified type; Critical limb ischemia of left lower extremity (H); Infrarenal abdominal aortic aneurysm (AAA) without rupture (H24); Acute kidney failure with tubular necrosis (H24)      hydrOXYzine (ATARAX) 25 MG tablet Take 1 tablet (25 mg) by mouth every 6 hours as needed for other  (adjuvant pain)    Associated Diagnoses: Infrarenal abdominal aortic aneurysm, ruptured (H); Hypertension, unspecified type; Critical limb ischemia of left lower extremity (H); Infrarenal abdominal aortic aneurysm (AAA) without rupture (H24); Acute kidney failure with tubular necrosis (H24)           STOP taking these medications       apixaban ANTICOAGULANT (ELIQUIS) 5 MG tablet Comments:   Reason for Stopping:         diphenhydrAMINE-zinc acetate (BENADRYL) 1-0.1 % external cream Comments:   Reason for Stopping:         lipase-protease-amylase (CREON 24) [id number] units CPEP per EC capsule Comments:   Reason for Stopping:         lipase-protease-amylase (CREON 24) [id number] units CPEP per EC capsule Comments:   Reason for Stopping:         triamcinolone (KENALOG) 0.1 % external ointment Comments:   Reason for Stopping:                 DISCHARGE INSTRUCTIONS AND FOLLOW UP  Discharge Procedure Orders   Follow Up and recommended labs and tests   Order Comments: Follow up with vascular surgery team for surgical intervention as planned on 11/30/23 and post-operative management per their recommendations.     Reason for your hospital stay   Order Comments: You were admitted for rehabilitation following AAA repair and left above knee amputation with additional complications and are being discharged back to the hospital with concerns for wound infection in left leg.     Wound care (specify)   Order Comments: Site: Right heel wound(s)   Instructions:  Every 3 days and PRN cleanse with wound cleanser and pat dry. Paint filemon wound skin with no sting. Conform mepilex over wound. Ensure heels are elevated with pillows or heel lift boots at all times.   *May need to use sacral mepilex if 4x4 continues to lift/ peel up.     Wound care   Order Comments: Site: Abdominal incision   Instructions:  Please clean with Betadine, allow to dry, apply Primapore dressing.  Change dressing daily.     Activity - Up with  nursing assistance     Order Specific Question Answer Comments   Is discharge order? Yes      Weight bearing status   Order Comments: Nonweightbearing LLE     Wound care   Order Comments: Site: LLE AKA  Instructions: Prevena applied 11/27/23 by vascular surgery.  ACE wrap the left leg stump at all times.     Nutrition Services Adult IP Consult   Order Comments: Reason: continue to monitor and optimize nutrition     Physical Therapy Adult Consult   Order Comments: Evaluate and treat as clinically indicated.    Reason: AKA, stroke     Occupational Therapy Adult Consult   Order Comments: Evaluate and treat as clinically indicated.    Reason: AKA, stroke     Speech Language Path Adult Consult   Order Comments: Evaluate and treat as clinically indicated.    Reason: dysphonia, dysphagia, impaired cognition     Fall precautions     Diet   Order Comments: Follow this diet upon discharge: NPO at midnight for surgery.  Otherwise currently on regular diet, thin liquids.      Snacks/Supplements Adult: Magic Cup; With Meals      Snacks/Supplements Adult: Ensure Enlive; With Meals     Order Specific Question Answer Comments   Is discharge order? Yes           PHYSICAL EXAMINATION    Most recent Vital Signs:   Vitals:    11/30/23 1530 11/30/23 1938 12/01/23 0100 12/01/23 0747   BP: 116/72 130/83  (!) 151/91   BP Location: Right arm Right arm  Right arm   Patient Position:  Supine     Cuff Size:       Pulse: 89 92  107   Resp: 18   16   Temp: 97.5  F (36.4  C)   97.7  F (36.5  C)   TempSrc: Oral   Oral   SpO2: 97% 97%  100%   Weight:   59.3 kg (130 lb 11.7 oz)    Height:           Patient was not seen on this date due to early morning departure.  See prior notes for most recent exam findings.    Discharge summary was forwarded to [facility], [facility] (PCP) at the time of discharge, so as to bridge from hospital to outpatient care.     It was our pleasure to care for Alfredo Burnham during this hospitalization. Please do  not hesitate to contact me should there be questions regarding the hospital course or discharge plan.          Zofia Bray PA-C  Physical Medicine and Rehabilitation

## 2023-12-01 NOTE — PLAN OF CARE
Goal Outcome Evaluation:      Plan of Care Reviewed With: patient     Overall Patient Progress: no change    Outcome Evaluation: No acute changes overnight. Pt slept throughout the night. Denied pain. Frequent rounding done, chest rise observed. NPO since midnight. Plan for I & D at 10 am. L AKA within normal limits. Safety check in place. Continue with plan of care

## 2023-12-01 NOTE — LETTER
Transition Communication Hand-off for Care Transitions to Next Level of Care Provider    Name: Alfredo Burnham  : 1955  MRN #: [id number]  Primary Care Provider: Ivonne Griffin NP     Reason for Hospitalization:  Infection following a procedure, deep incisional surgical site, initial encounter [T81.42XA]  Admit Date/Time: 2023  3:51 PM  Discharge Date: 23  Payor Source: Payor: [facility] / Plan: [facility] MEDICARE / Product Type: HMO /     Reason for Communication Hand-off Referral: Other Inpatient to Outpatient    Discharge Plan: Home with family, home infusion for IV abx, home care RN, PT, OT, HHA       Discharge Needs Assessment:  Needs      Flowsheet Row Most Recent Value   Equipment Currently Used at Home shower chair, grab bar, tub/shower, grab bar, toilet, tub bench, commode chair  [pt previously did not use AD at baseline but spouse has assisted with obtaining shower chair, drop arm commode, slideboard and working on getting grab bars installed around toilet at home]   # of Referrals Placed by CM External Care Coordination, Homecare, Home Infusion              Follow-up plan:    Future Appointments   Date Time Provider Department Center   2023  7:00 AM Jacki Syed, SLP [facility]   2024  9:00 AM Nidia Garcia MD [facility]   2024  8:00 AM Kay Cobb MD [facility]       Any outstanding tests or procedures:        Referrals       Future Labs/Procedures    Home Care Referral     Comments:    Your provider has ordered home health services. If you have not been contacted within 2 days of your discharge please call the selected Home Care agency listed on your Discharge document.  If a Home Care agency is NOT listed, please call [phone].    Home Infusion Referral     OPAT enrollment and ID Clinic Referral     Scheduling Instructions:    Per Dr. Rosalinda NIEVEST note, ideally schedule appointment with Dr. Garcia before end of therapy 2024.    Comments:     You are being prescribed a strong antibiotic treatment for an infection. This treatment requires close monitoring, and you have been recommended to follow up with a specialist in the Infectious Diseases Clinic within 2 weeks of your hospital discharge.   Our team of Infectious Diseases specialists looks forward to seeing you in clinic. A representative will call you within 3 business days to help schedule your appointment. If you do not receive a call to schedule, or if you have any questions about or problems with your clinical care, please contact us by phone at [phone].      OPAT enrollment and ID Clinic Referral     Comments:    You are being prescribed a strong antibiotic treatment for an infection. This treatment requires close monitoring, and you have been recommended to follow up with a specialist in the Infectious Diseases Clinic within 2 weeks of your hospital discharge.   Our team of Infectious Diseases specialists looks forward to seeing you in clinic. A representative will call you within 3 business days to help schedule your appointment. If you do not receive a call to schedule, or if you have any questions about or problems with your clinical care, please contact us by phone at [phone].              Supplies       Future Labs/Procedures    [facility] Order for DME - ONLY FOR DME     Process Instructions:    By signing this order, the Authorizing Provider is attesting that they have completed a face-to-face evaluation on the patient to determine their need for this equipment in the last 60 days. A new face-to-face evaluation is required each time  A new prescription for one of the specified items is ordered.   If an additional provider completed the evaluation, please indicate their name below.     **As of 2018, an order requisition and face sheet will print for all DME orders. Please give printed order and face sheet to patient if not obtaining product from [facility] DME closet.      Comments:    I, the undersigned, certify that the above prescribed supplies are medically necessary for this patient and is both reasonable and necessary in reference to accepted standards of medical and necessary in reference to accepted standards of medical practice in the treatment of this patient's condition and is not prescribed as a convenience.             Key Recommendations:  See AVS    Rosa Barr RN    AVS/Discharge Summary is the source of truth; this is a helpful guide for improved communication of patient story

## 2023-12-01 NOTE — ANESTHESIA CARE TRANSFER NOTE
Patient: Alfredo Burnham    Procedure: Procedure(s):  irrigation and drainage of collection left above knee amputation stump       Diagnosis: S/P above knee amputation, left (H) [Z89.612]  Diagnosis Additional Information: No value filed.    Anesthesia Type:   General     Note:    Oropharynx: oropharynx clear of all foreign objects and spontaneously breathing  Level of Consciousness: awake  Oxygen Supplementation: face mask  Level of Supplemental Oxygen (L/min / FiO2): 6  Independent Airway: airway patency satisfactory and stable  Dentition: dentition unchanged  Vital Signs Stable: post-procedure vital signs reviewed and stable  Report to RN Given: handoff report given  Patient transferred to: PACU    Handoff Report: Identifed the Patient, Identified the Reponsible Provider, Reviewed the pertinent medical history, Discussed the surgical course, Reviewed Intra-OP anesthesia mangement and issues during anesthesia, Set expectations for post-procedure period and Allowed opportunity for questions and acknowledgement of understanding      Vitals:  Vitals Value Taken Time   /83 12/01/23 1312   Temp     Pulse 80 12/01/23 1319   Resp 16    SpO2 97 % 12/01/23 1319   Vitals shown include unfiled device data.    Electronically Signed By: ALTAGRACIA Smith CRNA  December 1, 2023  1:21 PM

## 2023-12-01 NOTE — PLAN OF CARE
Goal Outcome Evaluation:      Plan of Care Reviewed With: patient    Overall Patient Progress: no changeOverall Patient Progress: no change    Outcome Evaluation: Pt's BP elevated this morning before scheduled metoprolol and norvasc which pt took with sips of water. NPO since midnight. He said he is ready for planned procedure this morning. Noted dressing over mid abdomen and left AKA stump. Denied pain. CHG wipes done and pt changed to clean pt gown. [facility] came at 0835 and pt now in [address] [place] [address].

## 2023-12-01 NOTE — ANESTHESIA PREPROCEDURE EVALUATION
Anesthesia Pre-Procedure Evaluation    Patient: Alfredo Burnham   MRN: [id number] : 1955        Procedure : Procedure(s):  wound exploration, irrigation and drainage of collection left lower extremity          Past Medical History:   Diagnosis Date    Hypertension 2011    Macular degeneration       Past Surgical History:   Procedure Laterality Date    AMPUTATE LEG ABOVE KNEE Left 10/17/2023    Procedure: AMPUTATION, ABOVE KNEE and Abdominal wound vac exchange;  Surgeon: Ash Boucher MBBS;  Location: [facility]    CLOSE SECONDARY WOUND ABDOMEN N/A 10/20/2023    Procedure: Delayed primary subcutaneous abdominal closure;  Surgeon: Boris Lowe;  Location: [facility]    INCISION AND DRAINAGE ABDOMEN WASHOUT, COMBINED N/A 2023    Procedure: abdominal washout, combined, repacking,  abthera placement;  Surgeon: Ash Boucher MBBS;  Location: [facility]    INCISION AND DRAINAGE ABDOMEN WASHOUT, COMBINED N/A 2023    Procedure: Abdominal washout, Retroperitoneal closure, washout left lower extremity wound;  Surgeon: Boris Lowe;  Location: [facility]    IR CVC NON TUNNEL LINE REMOVAL  10/18/2023    IR CVC TUNNEL PLACEMENT > 5 YRS OF AGE  10/18/2023    IR CVC TUNNEL REMOVAL RIGHT  2023    IR OR ANGIOGRAM  2023    IRRIGATION AND DEBRIDEMENT ABDOMEN WASHOUT, COMBINED N/A 2023    Procedure: exploration of  ABDOMINAL CAVITY, placement of abthera;  Surgeon: Boris Lowe;  [facility]    IRRIGATION AND DEBRIDEMENT ABDOMEN WASHOUT, COMBINED N/A 10/2/2023    Procedure: Exploratory laparotomy, abominal closure, wound vac change left lower extremity;  Surgeon: Jonathan Fry MD;  [facility]    IRRIGATION AND DEBRIDEMENT LOWER EXTREMITY, COMBINED Left 2023    Procedure: exploration of left lower extremity, partial closure of left lower extremity, wound vac placement;  Surgeon: Boris Lowe;  [facility]: [facility]    LAPAROTOMY EXPLORATORY N/A 2023    Procedure:  Laparotomy exploratory;  Surgeon: Roger Shirley MD;  [facility]    REPAIR ANEURYSM ABDOMINAL AORTA N/A 9/24/2023    Procedure: Resection of Ruptureed Abdominal Aneurysm, Aortic biliary bypass with 20 x 10 mm Bifurcated hemagard graft, Temporary Abdominal Closure, Endovascular Balloon Inclusion of Aorta;  Surgeon: Boris Lowe;  Location: [facility]    SIGMOIDOSCOPY FLEXIBLE N/A 9/25/2023    Procedure: Sigmoidoscopy flexible;  Surgeon: Gabino Walker MD;  [facility]    THROMBECTOMY LOWER EXTREMITY Left 9/25/2023    Procedure: SFA, popliteal, and tibial thromboembolectomy and four compartment fasciotomy;  Surgeon: Ash Boucher MBBS;  Location: [facility]      Allergies   Allergen Reactions    Penicillins Swelling     Facial swelling    Has tolerated cefazolin.     Cefepime Rash     Diffuse whole body erythematous pruritic rash      Social History     Tobacco Use    Smoking status: Every Day     Packs/day: .5     Types: Cigarettes    Smokeless tobacco: Not on file   Substance Use Topics    Alcohol use: Yes     Comment: 1-2/wk      Wt Readings from Last 1 Encounters:   12/01/23 59.3 kg (130 lb 11.7 oz)        Anesthesia Evaluation   Pt has had prior anesthetic. Type: General.        ROS/MED HX  ENT/Pulmonary: Comment: Left sided vocal cord palsy due to prolonged intubation.    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                                 METS/Exercise Tolerance:     Hematologic:     (+) History of blood clots,    pt is anticoagulated, anemia, history of  "blood transfusion, no previous transfusion reaction,        Musculoskeletal: Comment: Left AKA      GI/Hepatic:     (+) GERD, Asymptomatic on medication,                  Renal/Genitourinary:     (+) renal disease, type: CRI, Pt does not require dialysis,           Endo:  - neg endo ROS     Psychiatric/Substance Use:     (+)   alcohol abuse      Infectious Disease:  - neg infectious disease ROS     Malignancy:  - neg malignancy ROS     Other:            Physical Exam    Airway        Mallampati: II   TM distance: > 3 FB   Neck ROM: limited   Mouth opening: > 3 cm    Respiratory Devices and Support         Dental       (+) Multiple visibly decayed, broken teeth      Cardiovascular   cardiovascular exam normal          Pulmonary   pulmonary exam normal                OUTSIDE LABS:  CBC:   Lab Results   Component Value Date    WBC 6.7 11/30/2023    WBC 8.6 11/29/2023    HGB 8.3 (L) 11/30/2023    HGB 7.8 (L) 11/29/2023    HCT 25.8 (L) 11/30/2023    HCT 24.1 (L) 11/29/2023     (L) 11/30/2023     11/29/2023     BMP:   Lab Results   Component Value Date     (L) 11/30/2023     (L) 11/27/2023    POTASSIUM 4.4 11/30/2023    POTASSIUM 4.3 11/27/2023    CHLORIDE 102 11/30/2023    CHLORIDE 102 11/27/2023    CO2 24 11/30/2023    CO2 22 11/27/2023    BUN 51.4 (H) 11/30/2023    BUN 54.3 (H) 11/27/2023    CR 2.00 (H) 11/30/2023    CR 2.02 (H) 11/27/2023     (H) 11/30/2023     (H) 11/27/2023     COAGS:   Lab Results   Component Value Date    PTT 33 10/25/2023    INR 1.24 (H) 10/25/2023    FIBR 556 (H) 10/25/2023     POC: No results found for: \"BGM\", \"HCG\", \"HCGS\"  HEPATIC:   Lab Results   Component Value Date    ALBUMIN 3.1 (L) 10/30/2023    PROTTOTAL 6.1 (L) 10/30/2023    ALT 15 10/30/2023    AST 24 10/30/2023    ALKPHOS 132 (H) 10/30/2023    BILITOTAL 0.5 10/30/2023    BHUPENDRA 18 10/25/2023     OTHER:   Lab Results   Component Value Date    PH 7.41 10/14/2023    PH 7.41 10/14/2023    LACT 1.5 " 10/28/2023    A1C 5.7 (H) 09/25/2023    OMAR 9.5 11/30/2023    PHOS 4.5 11/30/2023    MAG 1.9 11/30/2023    LIPASE 51 10/14/2023    TSH 16.70 (H) 10/25/2023       Anesthesia Plan    ASA Status:  4    NPO Status:  NPO Appropriate    Anesthesia Type: General.     - Airway: LMA   Induction: Intravenous.   Maintenance: Balanced.        Consents    Anesthesia Plan(s) and associated risks, benefits, and realistic alternatives discussed. Questions answered and patient/representative(s) expressed understanding.     - Discussed:     - Discussed with:  Patient      - Extended Intubation/Ventilatory Support Discussed: Yes.      - Patient is DNR/DNI Status: No     Use of blood products discussed: Yes.     - Discussed with: Patient.     - Consented: consented to blood products     Postoperative Care    Pain management: Multi-modal analgesia.   PONV prophylaxis: Ondansetron (or other 5HT-3), Dexamethasone or Solumedrol     Comments:              PAC Discussion and Assessment    ASA Classification: 4    Anesthetic techniques and relevant risks discussed: GA  Invasive monitoring and risk discussed: No    Possibility and Risk of blood transfusion discussed: Yes  NPO instructions given: NPO after midnight    Needs early admission to pre-op area: No                  Attending Anesthesiologist Anesthesia Assessment: History of vocal cord palsy secondary to prolonged intubation. Will proceed with LMA to minimize risk of further trauma to vocal cords.                          Zofia Hankins MD    I have reviewed the pertinent notes and labs in the chart from the past 30 days and (re)examined the patient.  Any updates or changes from those notes are reflected in this note.

## 2023-12-01 NOTE — OR NURSING
Anesthesia notified regarding differences in patients pupil sizes. Dr. Bryon Nicole bedside to assess (see note). No further interventions needed at this time.    Please see flowsheets, MAR, and results review for further details

## 2023-12-01 NOTE — PROGRESS NOTES
Patient with unequal pupils, which he says he has been told this before. Vision is at baseline (he has partial blindness). CN 2-12 otherwise intact. Motor and sensory intact in BUE and RLE.    Bryon Nicole MD  Anesthesiology, PGY-4

## 2023-12-01 NOTE — ANESTHESIA POSTPROCEDURE EVALUATION
Patient: Alfredo Burnham    Procedure: Procedure(s):  irrigation and drainage of collection left above knee amputation stump       Anesthesia Type:  General    Note:  Disposition: Inpatient   Postop Pain Control: Uneventful            Sign Out: Well controlled pain   PONV: No   Neuro/Psych: Uneventful            Sign Out: Acceptable/Baseline neuro status   Airway/Respiratory: Uneventful            Sign Out: Acceptable/Baseline resp. status   CV/Hemodynamics: Uneventful            Sign Out: Acceptable CV status; No obvious hypovolemia; No obvious fluid overload   Other NRE: NONE   DID A NON-ROUTINE EVENT OCCUR? No           Last vitals:  Vitals Value Taken Time   /75 12/01/23 1400   Temp 36.6  C (97.8  F) 12/01/23 1312   Pulse 84 12/01/23 1411   Resp 13 12/01/23 1400   SpO2 99 % 12/01/23 1411   Vitals shown include unfiled device data.    Electronically Signed By: Zofia Hankins MD  December 1, 2023  2:13 PM

## 2023-12-01 NOTE — PLAN OF CARE
Goal Outcome Evaluation:      Plan of Care Reviewed With: patient    Overall Patient Progress: no change    Outcome Evaluation: Pt is alert and oriented x4 , denies pain. Assist of x 1 SB to WC / bed . New dressing change applied to Left AKA, Abdominal incision and Rt. heel. No bleeding on Left AKA, staples and sutures are intact. Continent of bowel and bladder, uses bed side commode and urinal.  Pt will be NPO from midnight for surgery tomorrow,pt is aware. Continue POC.

## 2023-12-02 PROBLEM — T81.42XA INFECTION FOLLOWING A PROCEDURE, DEEP INCISIONAL SURGICAL SITE, INITIAL ENCOUNTER: Status: ACTIVE | Noted: 2023-12-02

## 2023-12-02 LAB
ANION GAP SERPL CALCULATED.3IONS-SCNC: 12 MMOL/L (ref 7–15)
BUN SERPL-MCNC: 49.5 MG/DL (ref 8–23)
CALCIUM SERPL-MCNC: 9.1 MG/DL (ref 8.8–10.2)
CHLORIDE SERPL-SCNC: 104 MMOL/L (ref 98–107)
CK SERPL-CCNC: 14 U/L (ref 39–308)
CREAT SERPL-MCNC: 2.1 MG/DL (ref 0.67–1.17)
DEPRECATED HCO3 PLAS-SCNC: 17 MMOL/L (ref 22–29)
EGFRCR SERPLBLD CKD-EPI 2021: 33 ML/MIN/1.73M2
ERYTHROCYTE [DISTWIDTH] IN BLOOD BY AUTOMATED COUNT: 15.8 % (ref 10–15)
GLUCOSE SERPL-MCNC: 116 MG/DL (ref 70–99)
HCT VFR BLD AUTO: 24.6 % (ref 40–53)
HGB BLD-MCNC: 7.8 G/DL (ref 13.3–17.7)
MCH RBC QN AUTO: 30.8 PG (ref 26.5–33)
MCHC RBC AUTO-ENTMCNC: 31.7 G/DL (ref 31.5–36.5)
MCV RBC AUTO: 97 FL (ref 78–100)
PLATELET # BLD AUTO: 143 10E3/UL (ref 150–450)
POTASSIUM SERPL-SCNC: 4.5 MMOL/L (ref 3.4–5.3)
RBC # BLD AUTO: 2.53 10E6/UL (ref 4.4–5.9)
SODIUM SERPL-SCNC: 133 MMOL/L (ref 135–145)
WBC # BLD AUTO: 8.1 10E3/UL (ref 4–11)

## 2023-12-02 PROCEDURE — 250N000011 HC RX IP 250 OP 636: Mod: JZ

## 2023-12-02 PROCEDURE — 120N000011 HC R&B TRANSPLANT UMMC

## 2023-12-02 PROCEDURE — 36415 COLL VENOUS BLD VENIPUNCTURE: CPT | Performed by: SURGERY

## 2023-12-02 PROCEDURE — 250N000013 HC RX MED GY IP 250 OP 250 PS 637: Performed by: PHYSICIAN ASSISTANT

## 2023-12-02 PROCEDURE — 80048 BASIC METABOLIC PNL TOTAL CA: CPT | Performed by: SURGERY

## 2023-12-02 PROCEDURE — 85027 COMPLETE CBC AUTOMATED: CPT

## 2023-12-02 PROCEDURE — 82550 ASSAY OF CK (CPK): CPT

## 2023-12-02 PROCEDURE — 258N000003 HC RX IP 258 OP 636

## 2023-12-02 PROCEDURE — 99223 1ST HOSP IP/OBS HIGH 75: CPT | Mod: 24 | Performed by: STUDENT IN AN ORGANIZED HEALTH CARE EDUCATION/TRAINING PROGRAM

## 2023-12-02 RX ADMIN — ACETAMINOPHEN 975 MG: 325 TABLET, FILM COATED ORAL at 14:20

## 2023-12-02 RX ADMIN — Medication 60 ML: at 08:44

## 2023-12-02 RX ADMIN — ACETAMINOPHEN 975 MG: 325 TABLET, FILM COATED ORAL at 08:40

## 2023-12-02 RX ADMIN — PANTOPRAZOLE SODIUM 40 MG: 40 TABLET, DELAYED RELEASE ORAL at 17:12

## 2023-12-02 RX ADMIN — SENNOSIDES 1 TABLET: 8.6 TABLET, FILM COATED ORAL at 08:40

## 2023-12-02 RX ADMIN — DOCUSATE SODIUM 50 MG: 50 CAPSULE, LIQUID FILLED ORAL at 12:01

## 2023-12-02 RX ADMIN — AMLODIPINE BESYLATE 10 MG: 10 TABLET ORAL at 08:40

## 2023-12-02 RX ADMIN — ACETAMINOPHEN 975 MG: 325 TABLET, FILM COATED ORAL at 20:02

## 2023-12-02 RX ADMIN — PSYLLIUM HUSK 1 PACKET: 3.4 POWDER ORAL at 08:40

## 2023-12-02 RX ADMIN — QUETIAPINE FUMARATE 75 MG: 25 TABLET ORAL at 20:02

## 2023-12-02 RX ADMIN — Medication 50 MCG: at 12:01

## 2023-12-02 RX ADMIN — MELATONIN 3 MG: at 20:02

## 2023-12-02 RX ADMIN — DAPTOMYCIN 500 MG: 500 INJECTION, POWDER, LYOPHILIZED, FOR SOLUTION INTRAVENOUS at 17:09

## 2023-12-02 RX ADMIN — METOPROLOL TARTRATE 100 MG: 50 TABLET, FILM COATED ORAL at 20:02

## 2023-12-02 RX ADMIN — METOPROLOL TARTRATE 100 MG: 50 TABLET, FILM COATED ORAL at 08:40

## 2023-12-02 ASSESSMENT — ACTIVITIES OF DAILY LIVING (ADL)
ADLS_ACUITY_SCORE: 43
ADLS_ACUITY_SCORE: 37
ADLS_ACUITY_SCORE: 48
ADLS_ACUITY_SCORE: 35
ADLS_ACUITY_SCORE: 37
ADLS_ACUITY_SCORE: 48
ADLS_ACUITY_SCORE: 48
ADLS_ACUITY_SCORE: 37
ADLS_ACUITY_SCORE: 37
ADLS_ACUITY_SCORE: 48
ADLS_ACUITY_SCORE: 48
ADLS_ACUITY_SCORE: 43

## 2023-12-02 NOTE — CONSULTS
Care Management Initial Consult    General Information  Assessment completed with: Patient,  (Nelson)  Type of CM/SW Visit: Initial Assessment    Primary Care Provider verified and updated as needed: Yes   Readmission within the last 30 days: other (see comments) (Returned to the hospital for a washout, and readmitted to the hospital after. There were concerns for an infection)   Return Category: Post-op/Post-procedure complication  Reason for Consult: discharge planning  Advance Care Planning: Advance Care Planning Reviewed:  (Does not have a health care directive - His spouse would be his NOK. He has several adult children)          Communication Assessment  Patient's communication style: spoken language (English or Bilingual)             Cognitive  Cognitive/Neuro/Behavioral: WDL  Level of Consciousness: alert  Arousal Level: opens eyes spontaneously  Orientation: oriented x 4  Mood/Behavior: calm, cooperative  Best Language: 0 - No aphasia  Speech: hoarse    Living Environment:   People in home: spouse   (Tabby)  Current living Arrangements: independent living facility      Able to return to prior arrangements: yes       Family/Social Support:  Care provided by: self, spouse/significant other  Provides care for:    Marital Status:   Wife, Children   (Tabby)       Description of Support System: Supportive, Involved    Support Assessment: Adequate family and caregiver support, Adequate social supports    Current Resources:   Patient receiving home care services:  (None. Came from [facility])     Community Resources: None  Equipment currently used at home: none  Supplies currently used at home: None    Employment/Financial:  Employment Status: retired        Financial Concerns: none   Referral to Financial Worker: No       Does the patient's insurance plan have a 3 day qualifying hospital stay waiver?  No    Lifestyle & Psychosocial Needs:  Social Determinants of Health     Food Insecurity: Not on file   Depression:  Not on file   Housing Stability: Not on file   Tobacco Use: High Risk (12/1/2023)    Patient History     Smoking Tobacco Use: Every Day     Smokeless Tobacco Use: Unknown     Passive Exposure: Not on file   Financial Resource Strain: Not on file   Alcohol Use: Not on file   Transportation Needs: Not on file   Physical Activity: Not on file   Interpersonal Safety: Not on file   Stress: Not on file   Social Connections: Not on file       Functional Status:  Prior to admission patient needed assistance:              Mental Health Status:  Mental Health Status: Current Concern       Chemical Dependency Status:  Chemical Dependency Status: No Current Concerns             Values/Beliefs:  Spiritual, Cultural Beliefs, Yazidism Practices, Values that affect care:                 Additional Information:  Nelson was admitted to [facility] on [facility] from [facility]. I met with Nelson at bedside and he did not report any changed to his living environment, social supports, insurance, finances or chemical health. He reported that his mental health has been good at [facility], but today is feeling very down about being back in the hospital. He indicated that he does not want to return to [facility], but also reported that he does not have enough support to return home. Nelson reported that he was getting close to discharging from [facility], so would prefer to return home, and is disheartened by a potential infection. I provided supportive counseling during this visit.     He requested a WC and slide board for home.     Nimo Hoyt, JACINTO  12/2/2023       Social Work and Care Management Department       SEARCHABLE in [employer] - search SOCIAL WORK       [place] ([phone]) Saturday and Sunday     Units: 4A, 4C, & 4E Pager: [phone]     Units: 5A & 5C Pager: [phone]     Units: 6A & 6B   Pager: [phone]     Units: 6C Pager: [phone]     Units: 7A & 7B  Pager: [phone]     Units: 7C Pager: [phone]     Unit: [place] ED Pager:  [phone]      [facility] ([id number]) Saturday and Sunday      Units: 5 Ortho, 5 Med/Surg & WB ED  Pager:[phone]     Units: 6 Med/Surg, 8A, & 10A ICU  Pager: [phone]        After hours (1630 - 0000) Saturday & Sunday; ([id number]) Mon-Fri; ([phone]) [facility] Recognized Holidays     Units: ALL  Pager: [phone]

## 2023-12-02 NOTE — PROVIDER NOTIFICATION
"Vascular surgery team (MD Diana Guadarrama) was paged    \"1+ Enterococcus faecium VRE reported/resulted for leg left wound Aerobic bacterial culture from 11/29/23 at 8:18 PM.\"           \"pt is not diabetic per H&P. Order for Blood glucose check q2h. Pt also stating no hx of DM and refusing checks. Pls look into this order.\"  "

## 2023-12-02 NOTE — PLAN OF CARE
[id number]  /82 (BP Location: Right arm)   Pulse 97   Temp 98  F (36.7  C) (Oral)   Resp 18   SpO2 100%     Pt. transferred to unit from PACU. A&O x4, RA. PIV, SL. Tolerating reg, wife got food from cafeteria. Denies pain and SOB. (L) knee amputation. Uses the urinal, no BM. Pivot to the commode with an assist x1. No acute change, continue w/poc    No skin assessment done and charting. Was notified by the CN to move to 7C when it was 15 min to 1900. Will pass on to incoming nurse.

## 2023-12-02 NOTE — CONSULTS
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 Eventually discharged to ARU, relatively doing well until 11/29, when noticed malodorous drainage from lateral side of stump incision, when malfunction of wound vac. Afebrile, hemodynamically stable. Pre-op cultures grew VRE, pending susceptibility.  Underwent I&D by vascular surgery. Intra-op cultures are obtained, pending. Reviewed OP findings, and per discussion with primary team, debridement up to bone level. Based on this information, anticipate at least 4-6 weeks of antibiotic course. Recommend to start iv Daptomycin.     Recommendations:  Follow up final cultures (11/29, and 12/1), pending susceptibility of VRE  Start iv Daptomycin 8mg/kg/day adjusted BW  Check baseline CK level  Should hold off statin while on Daptomycin. Appreciate pharmacy inputs if needs.   Anticipate 4-6 weeks course  Follow up blood cultures (11/29) until finalize and if febrile >100.4F then obtain additional blood cxs set.     Recommendations discussed with primary team. Updated the patient and family at bedside.     Thank you for this consult. ID team will continue to follow this patient. Please reach out if any questions or concerns.     Total time spent during this encounter (chart  "review, documentation, MDM, coordination of care): 80 minutes     Nidia Garcia MD  Infectious Diseases Staff Physician  Pager: [phone]  Vocera anayeli   Date: 12/2/2023       History of Present Illness:     Alfredo Burnham is a 67 yo M with PMHx of HTN, 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, recent hospitalization (9/24 - 11/17, 2023) for AAA s/p open repair 9/24/2023, c/b LLE ischemia s/p unsuccessful thrombectomy s/p AKA (10/17/2023), MAYA requiring HD via R internal jugular tunneled and removal due to recovered renal function, embolic CVA, left iliac, psoas muscle hematoma, dysphonia 2/2 left vocal fold paralysis. Eventually discharged to ARU (11/17 - 11/30), and required to admit due to left AKA stump site infection. Alfredo was relatively doing well  until 11/29, when noted wound vac malfunction, and significant amount of serosanguineous, purulent appearing malodorous drainage, expressed out with pressure application, from an small opening on lateral aspect of stump incision. 11/29 Wound and blood cultures obtained and transferred to [facility]. Underwent I&D 12/1. Intra-op findings: \"producing again a large volume of brownish fluid....was a small amount of murky looking subcutaneous tissue\". Intra-op cultures were taken from the cavity as the fluid was emanating. 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 ID consult is requested for antibiotic management.          Review of Systems:     Complete 12 point review of systems negative except as noted in HPI.         Recent Antimicrobials::     None recently        Past Medical History:     Past Medical History:   Diagnosis Date    Hypertension 2/8/2011    Macular degeneration          Allergies:      Allergies   Allergen Reactions    Penicillins Swelling     Facial swelling    Has tolerated cefazolin.     Cefepime Rash     Diffuse whole body erythematous pruritic rash          Family History:   Reviewed and noncontributory.   Family History   Problem " Relation Age of Onset    Unknown/Adopted Mother     Heart Disease Mother     Arthritis Mother     Hypertension Brother     Blood Disease Sister     Psychotic Disorder Sister           Social History:     Social History     Socioeconomic History    Marital status:      Spouse name: Not on file    Number of children: Not on file    Years of education: Not on file    Highest education level: Not on file   Occupational History    Not on file   Tobacco Use    Smoking status: Every Day     Packs/day: .5     Types: Cigarettes    Smokeless tobacco: Not on file   Substance and Sexual Activity    Alcohol use: Yes     Comment: 1-2/wk    Drug use: No    Sexual activity: Yes     Partners: Female   Other Topics Concern    Parent/sibling w/ CABG, MI or angioplasty before 65F 55M? Yes   Social History Narrative    Not on file     Social Determinants of Health     Financial Resource Strain: Not on file   Food Insecurity: Not on file   Transportation Needs: Not on file   Physical Activity: Not on file   Stress: Not on file   Social Connections: Not on file   Interpersonal Safety: Not on file   Housing Stability: Not on file            Physical Exam:     /81 (BP Location: Right arm)   Pulse 90   Temp 98.6  F (37  C) (Oral)   Resp 16   SpO2 96%      Exam:  GENERAL:  Well-developed, well-nourished, supine, in no acute distress.   Head: normocephalic, atraumatic.   EYES: PERRLA, EOMI, conjunctiva clear, anicteric sclerae.    ENT:  Oropharynx is moist without exudates or ulcers.  NECK:  Supple.  LUNGS:  Breathing comfortably, on room air, clear to auscultation bilaterally, no w/r/r.  CARDIOVASCULAR:  Regular rate and rhythm, +S1S2 with no murmurs, gallops or rubs.  ABDOMEN:  Normal bowel sounds, soft, nontender. No appreciable hepatosplenomegaly.   : no suprapubic or flank tendterness.   EXT: Left AKA stump - covered in surgical dressing  SKIN:  No acute rashes.    NEUROLOGIC:  Grossly nonfocal.     Lines and devices:    PIV is in place without any surrounding erythema.    Labs, Microbiology and Imaging studies are reviewed.   12/1 intra-op cxs in-process  11/29 wound cxs VRE  11/29 blood cultures NGTD         Laboratory Data:     Hematology Studies    Recent Labs   Lab Test 11/30/23  0556 11/29/23  1934 11/27/23  0617 11/23/23  1126 11/20/23  0617 11/17/23  0557 10/27/23  1255 10/27/23  0548 10/26/23  1051 10/26/23  0700 10/24/23  0756 10/24/23  0651 10/23/23  0719 10/23/23  0555 10/22/23  1309 10/22/23  0422 10/21/23  0354   WBC 6.7 8.6 8.9 8.9 7.1 7.3   < > 11.5*   < > 13.2*   < > 12.4*  --  12.6*  --  11.3* 13.9*   ANEU  --   --   --   --   --   --   --  8.1  --  9.4*  --  9.5*  --  9.3*  --  8.4* 9.3*   AEOS  --   --   --   --   --   --   --  3.0*  --  2.4*  --  1.9*  --  2.4*  --  2.0* 1.8*   HGB 8.3* 7.8* 8.0* 8.1* 7.8* 7.7*   < > 8.4*   < > 8.4*   < > 5.8*   < > 6.8*   < > 7.1* 8.0*   MCV 95 95 96 96 96 98   < > 94   < > 96   < > 95  --  96  --  94 90   * 157 143* 150 142* 107*   < > 124*   < > 128*   < > 142*  --  185  --  167 159    < > = values in this interval not displayed.       Metabolic Studies     Recent Labs   Lab Test 11/30/23  0556 11/27/23  0617 11/26/23  0826 11/25/23  0840 11/23/23  1126 11/20/23  0617 11/17/23  0557   * 133*  --   --  135 133* 130*   POTASSIUM 4.4 4.3 4.3 4.5 4.9 4.6 4.8   CHLORIDE 102 102  --   --  101 100 100   CO2 24 22  --   --  23 25 20*   BUN 51.4* 54.3*  --   --  56.7* 58.7* 58.2*   CR 2.00* 2.02*  --   --  2.01* 1.96* 1.97*   GFRESTIMATED 35* 35*  --   --  35* 36* 36*       Hepatic Studies    Recent Labs   Lab Test 10/30/23  0651 10/29/23  0401 10/28/23  0525 10/27/23  0548 10/26/23  0700 10/25/23  0439   BILITOTAL 0.5 0.4 0.5 0.4 0.4 0.5   ALKPHOS 132* 128 133* 126 111 84   ALBUMIN 3.1* 3.0* 3.1* 3.0* 2.8* 3.1*   AST 24 25 34 38 52* 37   ALT 15 19 25 26 20 23       Last check of C difficile  C Difficile Toxin B by PCR   Date Value Ref Range Status   11/06/2023 Negative  Negative Final     Comment:     A negative result does not exclude actual disease due to C. difficile and may be due to improper collection, handling and storage of the specimen or the number of organisms in the specimen is below the detection limit of the assay.         Microbiology  Lab Results   Component Value Date    CULTURE 1+ Enterococcus faecium VRE (A) 11/29/2023

## 2023-12-02 NOTE — PROGRESS NOTES
7212-02 Alfredo Burnham pt has order for blood sugars to be checked Q2H. Blood sugars have been stable and pt on regulars diet. When asked pt denies history of diabetes. Questioning the Q2H BG order. Tracie ORELLANA -[phone]    Paged: JAVIER HARRINGTON - FIRST CALL - [facility] VASCULAR SURGERY INPTS

## 2023-12-02 NOTE — PLAN OF CARE
Goal Outcome Evaluation:      Plan of Care Reviewed With: patient    Overall Patient Progress: no change    Status: Pt s/p Irrigation and drainage of left above-knee amputation stump on 12/1.   PMHx: left AKA 10/17/23, Ruptured pararenal AAA s/p open repair 09/24/23, acute ischemic stroke, PE/DVT, HTN, anemia, severe malnutrition, left vocal fold paralysis - ongoing dysphonia, bilateral legal blindness  Vitals: VSS on RA  Neuros: A&O x4. Flat affect. Denies N/T. Legally blind, pupils unequal (L 4mm, R 3mm) as noted in OR  IV: PIV SL  Labs/Electrolytes: CK to be drawn prior to start of IV Daptomycin this evening  Resp/trach: No shortness of breath.   Diet: regular diet   Bowel status: Last BM 12/2  : Voids via urinal  Skin: L AKA stump dressing to be changed by vascular surgery team, dressing CDI. Abdominal incision with new primapore dressing, CDI.   Pain: Denies pain  Activity: Up with A1-2, GB, pivot to commode. Pt declined to sit on recliner x2 this shift.  Social: Wife Tabby visiting, involved/supportive  Plan: ID consult, SW consult. Cont with current POC    Updates this shift: Aerobic left leg wound culture from 11/29 with 1+ enterococcus faecium VRE

## 2023-12-02 NOTE — PROGRESS NOTES
Admitted/transferred from: PACU  Time of arrival on unit 1730  2 RN full  skin assessment completed by Tracie FORBES RN   Skin assessment finding: issues found abdominal incision with dressing, L AKA and incision covered, mepilex on sacrum.     Interventions/actions: none      Will continue to monitor.

## 2023-12-02 NOTE — OP NOTE
Date of surgery: 1 December 2023    Location: Operating room, [facility]    Surgeon: Dr Boucher    Assistant: None    Anesthesiologist: Staff anesthesia    Anesthesia: General anesthesia    Preop diagnosis:  Infected left above-knee amputation stump  Status post left above-knee amputation  Status post open repair of left ruptured abdominal aortic aneurysm    Postop diagnosis:  same    Procedure:  Incision and drainage of left above-knee amputation stump  Sharp excisional debridement of left AKA stump subcutaneous tissue (area less than 10 cm)    Indication: This is a 68-year-old gentleman who is 6 and half weeks status post left above-knee amputation following a repair of a ruptured abdominal aortic aneurysm.  He had multiple initial postop issues but was in the rehab facility across the [place].  He was seen by the vascular NP who removed a few staples (As a planned routine measure given his 6-week postop status) which resulted in a large foul-smelling fluid collection emanating from the small opening.  He had denied pain in his amputation site.  He was afebrile with a normal white count.  However given the foul-smelling nature of the fluid and the copious amount that was obtained at the bedside it was felt prudent to surgically explore the amputation stump to ensure no deep-seated collection.  I was asked to cover the case by my colleague Dr. Kandace Mcleod.  Informed consent was obtained from the patient and his wife with the wife signed the consent due to the patient's longstanding visual issues.    Procedure:  The patient was brought to the operating room placed supine on the table.  General anesthesia and IV antibiotics was administered by the anesthetic staff.  The left AKA stump was prepped and draped in sterile fashion.  I removed all the staples.  The tiny opening was on the lateral half of his staple line.  I cut the sutures on the lateral side of the incision.  Marquez scissors was  used to cut the incision on the lateral side.  I digitally explored the opening and enlarged this with my finger producing again a large volume of brownish fluid.  On this occasion there was no malodor.  I then probed and opened up the lateral side with my finger bluntly and placed a wheat [place] retractor.  The fascial layer had been breached.  The femur was visible.  No pus was noted.  There appeared some old liquefied hematoma which I bluntly removed.  There was a small amount of murky looking subcutaneous tissue which I sharply transected with a #10 blade this did produce bleeding which was controlled by electrocautery.  I then used the curette on the open portion of the muscles which appeared healthy.  3 L of suction  was then used to thoroughly irrigate the entire opening deeply into the cavity and this was also extended medially.  A lap pad was placed deeply to check for bleeding and removed.  Normal large amount of blood was noted.  There was a small amount of ooze from all the tissues, given that he had been on Eliquis.  To be complete as well-controlled by electrocautery.  I then placed 3 interrupted 3-0 Vicryl sutures to oppose the musculature to cover the femur.  A single wet Kerlix was then placed between these and deeply for some pressure for hemostasis as well as a wet-to-dry dressing for the infected tissue.  Dry 4 x 4 ABD Curlex and Ace wrap was then applied for dressing.  The patient was successfully extubated and transferred to the recovery room    Please note for the cultures were taken from the cavity as the fluid was emanating.    The plan will be for the patient to have daily wet-to-dry dressing and then transition likely from postop day #3 to a wound VAC.    There is a single Curlex dressing packed in the wound currently.

## 2023-12-02 NOTE — PROGRESS NOTES
7212-02 Alfredo Burnham, I've got questions regarding patient please contact me  [phone]    Paged: JAVIER HARRINGTON

## 2023-12-02 NOTE — PLAN OF CARE
/81 (BP Location: Right arm)   Pulse 94   Temp 98.1  F (36.7  C) (Oral)   Resp 16   SpO2 97%     Shift: 8697-4245  VS: VSS, afebrile  Neuro: Aox4  BG: none  Respiratory: RA  Pain/Nausea: Denied  Diet: Regular   IV Access: PIV SL  Lines/Drains: none  GI/: Voiding. No BM.   Skin: Abdominal incision with dressing, L AKA incision covered  Mobility: Assist x2 pivot to commode   Plan: Contuinue with POC and notify team of any changes.

## 2023-12-02 NOTE — PLAN OF CARE
Problem: Adult Inpatient Plan of Care  Goal: Plan of Care Review  Description: The Plan of Care Review/Shift note should be completed every shift.  The Outcome Evaluation is a brief statement about your assessment that the patient is improving, declining, or no change.  This information will be displayed automatically on your shift  note.  12/2/2023 1350 by Nimo Hoyt, JACINTO  Flowsheets (Taken 12/2/2023 1350)  Outcome Evaluation: Patient admitted from ARU. Patient voiced that he does not want to return. Discharge TBD  12/2/2023 1350 by Nimo Hoyt, LICSW  Flowsheets (Taken 12/2/2023 1350)  Outcome Evaluation: Patient admitted from ARU. Patient voiced that he does not want to return. Discharge TBD  Plan of Care Reviewed With: patient  Overall Patient Progress: no change   Goal Outcome Evaluation:

## 2023-12-02 NOTE — PHARMACY-ADMISSION MEDICATION HISTORY
Pharmacist Admission Medication History    Admission medication history is complete. The information provided in this note is only as accurate as the sources available at the time of the update.    Information Source(s): Hospital records    Pertinent Information:   - Patient admitted to [facility] 9/24/23-11/17/23, then [facility] 11/17/23-12/1/23, then transferred back to [facility]. Medication history completed during first [facility] admission - please see note from Abbi Sweet dated 9/25/23.   - Last doses updated based on MAR from [facility].    Changes made to PTA medication list:  Added: Apixaban - ordered 11/13/23 at [facility] and continued at [facility]  Deleted: None  Changed: None           Allergies reviewed with patient and updates made in EHR: not at this time - see note from Abbi Sweet 9/25/23 for allergy details    Medication History Completed By:   Katie Barone, PharmD      PTA Med List   Medication Sig Last Dose    acetaminophen (TYLENOL) 325 MG tablet Take 2 tablets (650 mg) by mouth every 6 hours as needed for fever Unknown at unknown    acetaminophen (TYLENOL) 325 MG tablet Take 3 tablets (975 mg) by mouth 3 times daily 11/30/2023 at PM    amLODIPine (NORVASC) 10 MG tablet Take 1 tablet (10 mg) by mouth daily 12/1/2023 at AM    apixaban ANTICOAGULANT (ELIQUIS) 5 MG tablet Take 5 mg by mouth 2 times daily 11/29/2023 at AM    atorvastatin (LIPITOR) 10 MG tablet Take 1 tablet (10 mg) by mouth every evening 11/30/2023 at PM    docusate sodium (COLACE) 50 MG capsule Take 1 capsule (50 mg) by mouth daily 11/30/2023 at AM    melatonin 3 MG tablet Take 1 tablet (3 mg) by mouth every evening 11/30/2023 at PM    metoprolol tartrate (LOPRESSOR) 100 MG tablet Take 1 tablet (100 mg) by mouth 2 times daily 12/1/2023 at AM    pantoprazole (PROTONIX) 40 MG EC tablet Take 1 tablet (40 mg) by mouth every 24 hours 11/30/2023 at 1621    polyethylene glycol (MIRALAX) 17 g packet Take 17 g by mouth daily as  needed for constipation Unknown at unknown    QUEtiapine (SEROQUEL) 25 MG tablet Take 3 tablets (75 mg) by mouth at bedtime 11/30/2023 at PM    sennosides (SENOKOT) 8.6 MG tablet Take 1 tablet by mouth 2 times daily 11/30/2023 at PM    Vitamin D3 (CHOLECALCIFEROL) 25 mcg (1000 units) tablet Take 2 tablets (50 mcg) by mouth every 24 hours 11/30/2023 at 1210

## 2023-12-02 NOTE — PROGRESS NOTES
Vascular Surgery Progress Note  12/02/2023       Subjective:  Patient feeling okay ,no fever, chills. Frustrated with infection in amputation stump, concerned about ongoing hospitalization.      Objective:  Temp:  [97.5  F (36.4  C)-98.6  F (37  C)] 97.6  F (36.4  C)  Pulse:  [] 87  Resp:  [10-18] 16  BP: ()/(62-86) 105/70  SpO2:  [95 %-100 %] 95 %    I/O last 3 completed shifts:  In: 355 [P.O.:355]  Out: 350 [Urine:350]      Gen: Awake, alert, NAD  CV: RRR per radial pulse  Resp: NLB on RA  Abd: , soft, nondistended, nontender  Incision: abdominal incision staples removed, clean, dry, intact.   Ext: WWP, no edema.   LLE amputation site dressing changed, kerlix packed into wound, wrapped with kerlix and ACE.      Labs:  Recent Labs   Lab 11/30/23  0556 11/29/23  1934 11/27/23  0617   WBC 6.7 8.6 8.9   HGB 8.3* 7.8* 8.0*   * 157 143*       Recent Labs   Lab 11/30/23  0556 11/27/23  0617 11/26/23  0826   * 133*  --    POTASSIUM 4.4 4.3 4.3   CHLORIDE 102 102  --    CO2 24 22  --    BUN 51.4* 54.3*  --    CR 2.00* 2.02*  --    * 116*  --    OMAR 9.5 9.5  --    MAG 1.9 1.9 1.9   PHOS 4.5 5.0* 4.9*       Imaging:  Relevant imaging reviewed.      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 Now growing VRE on wound cultures from 11/29.     - Doing well s/p washout  - Pain controlled  - Midline staples removed  - OK for reg diet  - ID consult given VRE, recommended starting daptomycin  - Will plan to restart eliquis Monday  - Vascular will do daily dressing changes at this time.     Seen and Discussed with vascular surgery staff, who is in agreement with the above.  - - - - - - - - - - - - - - - - - -  Will Donald, PGY-3  Vascular Surgery Resident

## 2023-12-03 ENCOUNTER — APPOINTMENT (OUTPATIENT)
Dept: PHYSICAL THERAPY | Facility: CLINIC | Age: 68
DRG: 464 | End: 2023-12-03
Attending: SURGERY
Payer: COMMERCIAL

## 2023-12-03 LAB
BACTERIA TISS BX CULT: ABNORMAL
BACTERIA WND CULT: ABNORMAL
GRAM STAIN RESULT: ABNORMAL
GRAM STAIN RESULT: ABNORMAL

## 2023-12-03 PROCEDURE — 97161 PT EVAL LOW COMPLEX 20 MIN: CPT | Mod: GP

## 2023-12-03 PROCEDURE — 250N000013 HC RX MED GY IP 250 OP 250 PS 637: Performed by: PHYSICIAN ASSISTANT

## 2023-12-03 PROCEDURE — 120N000011 HC R&B TRANSPLANT UMMC

## 2023-12-03 PROCEDURE — 99232 SBSQ HOSP IP/OBS MODERATE 35: CPT | Mod: 24 | Performed by: STUDENT IN AN ORGANIZED HEALTH CARE EDUCATION/TRAINING PROGRAM

## 2023-12-03 RX ADMIN — Medication 60 ML: at 13:42

## 2023-12-03 RX ADMIN — DOCUSATE SODIUM 50 MG: 50 CAPSULE, LIQUID FILLED ORAL at 10:01

## 2023-12-03 RX ADMIN — PSYLLIUM HUSK 1 PACKET: 3.4 POWDER ORAL at 08:18

## 2023-12-03 RX ADMIN — AMLODIPINE BESYLATE 10 MG: 10 TABLET ORAL at 08:19

## 2023-12-03 RX ADMIN — ACETAMINOPHEN 975 MG: 325 TABLET, FILM COATED ORAL at 19:42

## 2023-12-03 RX ADMIN — MELATONIN 3 MG: at 19:43

## 2023-12-03 RX ADMIN — ACETAMINOPHEN 975 MG: 325 TABLET, FILM COATED ORAL at 13:39

## 2023-12-03 RX ADMIN — SENNOSIDES 1 TABLET: 8.6 TABLET, FILM COATED ORAL at 08:19

## 2023-12-03 RX ADMIN — Medication 50 MCG: at 13:39

## 2023-12-03 RX ADMIN — SENNOSIDES 1 TABLET: 8.6 TABLET, FILM COATED ORAL at 19:44

## 2023-12-03 RX ADMIN — METOPROLOL TARTRATE 100 MG: 50 TABLET, FILM COATED ORAL at 08:19

## 2023-12-03 RX ADMIN — PANTOPRAZOLE SODIUM 40 MG: 40 TABLET, DELAYED RELEASE ORAL at 17:58

## 2023-12-03 RX ADMIN — QUETIAPINE FUMARATE 75 MG: 25 TABLET ORAL at 19:42

## 2023-12-03 RX ADMIN — ACETAMINOPHEN 975 MG: 325 TABLET, FILM COATED ORAL at 08:19

## 2023-12-03 RX ADMIN — ANTACID TABLETS 500 MG: 500 TABLET, CHEWABLE ORAL at 14:14

## 2023-12-03 RX ADMIN — METOPROLOL TARTRATE 100 MG: 50 TABLET, FILM COATED ORAL at 19:44

## 2023-12-03 ASSESSMENT — ACTIVITIES OF DAILY LIVING (ADL)
ADLS_ACUITY_SCORE: 43
ADLS_ACUITY_SCORE: 41
ADLS_ACUITY_SCORE: 43
ADLS_ACUITY_SCORE: 41
ADLS_ACUITY_SCORE: 43
ADLS_ACUITY_SCORE: 41

## 2023-12-03 NOTE — PROGRESS NOTES
"   12/03/23 0905   Appointment Info   Signing Clinician's Name / Credentials (PT) Traci Thomas, PT, DPT   Living Environment   People in Home spouse   Current Living Arrangements apartment   Home Accessibility wheelchair accessible   Transportation Anticipated family or friend will provide;health plan transportation   Self-Care   Usual Activity Tolerance good   Current Activity Tolerance moderate   Equipment Currently Used at Home none  (Will be getting a wheelchair before going home)   Fall history within last six months no   Activity/Exercise/Self-Care Comment Patient reports prior to AKA he was independent with all mobility. Since AKA has been assist of 1-2 for mobility and ARU, assist of 1 for slideboard transfers to wheelchair and for wheelchair mobility. Per chart review, patient's wife is available to assist at home. Patient does cooking and cleaning at baseline.   General Information   Onset of Illness/Injury or Date of Surgery 12/01/23   Referring Physician Ash Boucher MBBS   Patient/Family Therapy Goals Statement (PT) To go back to ARU   Pertinent History of Current Problem (include personal factors and/or comorbidities that impact the POC) Per chart review \"68 year old male well known to vascular surgery w/ h/o rutpured pararenal aneurysm s/p open repair in September of this year who was found to have drainage from his stump site concerning for infection now s/p washout on 12/1. Now growing VRE on wound cultures from 11/29. \"   Existing Precautions/Restrictions fall;weight bearing   Weight-Bearing Status - LLE nonweight-bearing   Weight-Bearing Status - RLE full weight-bearing   Cognition   Affect/Mental Status (Cognition) WFL   Follows Commands (Cognition) WFL   Range of Motion (ROM)   Range of Motion ROM is WFL   Strength (Manual Muscle Testing)   Strength (Manual Muscle Testing) strength is WFL   Strength Comments Appears functional for slideboard transfers, unable to assess strength for " standing due to wound dressing coming off during slideboard transfer.   Bed Mobility   Bed Mobility supine-sit   Supine-Sit [place] (Bed Mobility) independent   Transfers   Transfers bed-chair transfer   Maintains Weight-bearing Status (Transfers) able to maintain   Bed-Chair Transfer   Assistive Device (Bed-Chair Transfers) transfer board   Bed-Chair [place] (Transfers) 1 person assist;minimum assist (75% patient effort)   Comment, (Bed-Chair Transfer) Min ax 1 required to place slideboard, patient able to scoot MCC across slideboard with SBA x 1 until wound dressing came off during scooting. Patient declined to finish transfer until dressing had been replaced by nursing. SBA x 1 for scooting back to bed surface. Sit to supine independently.   Balance   Balance Comments Patient able to sit EOB with SBA  x1, no LOB noted. Able to sit on slideboard with SBA x 1 and BUE support without LOB noted.   Clinical Impression   Criteria for Skilled Therapeutic Intervention Yes, treatment indicated   PT Diagnosis (PT) Impaired functional mobiltiy   Influenced by the following impairments Deconditioning   Functional limitations due to impairments Transfers, gait   Clinical Presentation (PT Evaluation Complexity) stable   Clinical Presentation Rationale Patient presents as medically diagnosed   Clinical Decision Making (Complexity) low complexity   Planned Therapy Interventions (PT) bed mobility training;gait training;home exercise program;patient/family education;strengthening;transfer training;wheelchair management/propulsion training   Risk & Benefits of therapy have been explained evaluation/treatment results reviewed;care plan/treatment goals reviewed;participants included;patient  (Other relative)   PT Total Evaluation Time   PT Eval, Low Complexity Minutes (93904) 20   Physical Therapy Goals   PT Frequency Daily   PT Predicted Duration/Target Date for Goal Attainment 01/03/24   PT Goals Transfers   PT:  Transfers Modified independent;Bed to/from chair;Assistive device  (Slideboard transfer)   PT Discharge Planning   PT Plan Assess wheelchair mobility and sit to stand transfers   PT Discharge Recommendation (DC Rec) [facility]-Motivated patient will benefit from intensive, interdisciplinary therapy.  Anticipate will be able to tolerate 3 hours of therapy per day   PT Rationale for DC Rec Patient currently requires SBA for slideboard transfers, unable to assess further mobility due to wound dressing falling off during mobility. Patient admitted from ARU, per chart review and patient report patient was progressing towards in therapy towards functional independence. Anticiapte patient will be safe to discharge back to ARU once medically cleared to do so.   PT Brief overview of current status SBA for slideboard transfers   Total Session Time   Total Session Time (sum of timed and untimed services) 20     Traci Thomas, PT, DPT

## 2023-12-03 NOTE — PLAN OF CARE
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 Flat affect. Legally blind, pupils unequal (L 4mm, R 3mm) as noted in OR  IV: PIV SL  Labs/Electrolytes: No labs today  Resp/trach: No shortness of breath.   Diet: regular diet   Bowel status: Last BM 12/2. PRN tums x1 for c/o heartburn after lunch  : Voids via urinal  Skin: L AKA stump dressing to be changed by vascular surgery team, dressing CDI. Abdominal incision with primapore dressing, CDI.   Pain: Denies pain  Activity: Up with A2, GB, pivot to commode. A2 and lift depending on pt's energy level. Went on WC outside the room with son  Social: Son visiting, involved/supportive  Plan: On IV Daptomycin. ID team will order PICC for plan IV abx for 6 weeks. Cont with current POC

## 2023-12-03 NOTE — PROGRESS NOTES
[facility]: PROGRESS NOTE      Patient:  Alfredo Burnham   YOB: 1955, MRN: [id number]  Date of Visit: 12/03/2023  Date of Admission: 12/1/2023  Consult Requester: Ash Boucher MBBS          Assessment and Recommendations:     ID Problem List:  Left AKA stump infection s/p I&D 12/1/2023  Intra-op cxs 12/1 - Enterococcus faecium VRE  Non surgical wound cxs 11/29, VRE (E.faecium), Daptomycin DSS (EVELIO 3), S- linezolid, tigecycline  LLE ischemia S/p attempted thrombectomy, AKA on 10/17/23   Ruptured pararenal AAA c/b shock and colonic ischemia, s/p open AAA repair (9/24/23), Hemagard Dacron graft; abdominal wall closure 10/2/23       Discussion:  67 yo M with recent prolonged hospitalization for AAA s/p open repair c/b shock, colonic and LLE ischemia s/p AKA. Eventually discharged to ARU, relatively doing well until 11/29, when noticed malodorous drainage from lateral side of stump incision, when malfunction of wound vac. Afebrile, hemodynamically stable. Pre-op cultures grew VRE, susceptibility reviewed (Daptomycin DSS (EVELIO 3), S- linezolid, tigecycline). Underwent I&D by vascular surgery. Intra-op cultures are obtained, also growing VRE pending susceptibility. Reviewed OP findings, and per discussion with primary team, debridement up to bone level. Based on this information, anticipate at least 4-6 weeks of antibiotic course. Started iv Daptomycin, and based on susceptibility data, suggest to increase dose. Other antibiotic options are limited, and would like to avoid at this time due to side effects profile (Linezolid would cause cytopenia, and tigecycline with nausea/vomiting) while treating for 6 weeks course.  Discussed the consideration of po linezolid towards end of treatment course, if stump site continues to heal well. However, for now, plan to continue iv daptomycin.     Recommendations:  Continue iv Daptomycin   Increase dose to 12mg/kg daily (ordered for  "you). Though needs renal dose adjustment.   Anticipate 6 weeks antibiotic course  Okay to place PICC line   Local wound care of stump site  Discussed OPAT plan and outpatient ID follow up. Defer to SW/care coordinator regarding insurance coverage, including VA benefits related inquiries.     Updated patient and family at bedside.     Recommendations are discussed with the primary team.     Thank you for the consult. ID team will continue to follow. Please reach out if any questions or concerns.     Total time spent during this encounter (chart review, documentation, MDM, coordination of care): 40 minutes    Nidia Garcia MD   Infectious Diseases Staff Physician  Pager: [phone]  [employer] anayeli   12/03/2023         Interval History and Events:     Seen and evaluated at bedside, accompanied by his wife. No new complaints. Has questions regarding home antibiotic and related monitoring.          HPI as adopted from initial ID consult on 12/2/2023:     Alfredo Burnham is a 67 yo M with PMHx of HTN, 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, recent hospitalization (9/24 - 11/17, 2023) for AAA s/p open repair 9/24/2023, c/b LLE ischemia s/p unsuccessful thrombectomy s/p AKA (10/17/2023), MAYA requiring HD via R internal jugular tunneled and removal due to recovered renal function, embolic CVA, left iliac, psoas muscle hematoma, dysphonia 2/2 left vocal fold paralysis. Eventually discharged to ARU (11/17 - 11/30), and required to admit due to left AKA stump site infection. Alfredo was relatively doing well  until 11/29, when noted wound vac malfunction, and significant amount of serosanguineous, purulent appearing malodorous drainage, expressed out with pressure application, from an small opening on lateral aspect of stump incision. 11/29 Wound and blood cultures obtained and transferred to [facility]. Underwent I&D 12/1. Intra-op findings: \"producing again a large volume of brownish fluid....was a small amount of murky " "looking subcutaneous tissue\". Intra-op cultures were taken from the cavity as the fluid was emanating. 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 ID consult is requested for antibiotic management.      Social hx: Lives with wife (Tabby) in an apartment [place]. They do not have any pets in the home.          Review of Systems:   Targeted 10 point ROS was completed with pertinent positives and negatives are detailed above.         Antimicrobial history:     Daptomycin 12/2 - present         Physical Examination:   Temp:  [97.6  F (36.4  C)-98.4  F (36.9  C)] 98.2  F (36.8  C)  Pulse:  [76-97] 97  Resp:  [16-18] 16  BP: ()/(54-83) 119/82  SpO2:  [95 %-100 %] 98 %    Exam:  GENERAL:  Well-developed, well-nourished, supine, in no acute distress.   Head: normocephalic, atraumatic.   EYES: PERRLA, EOMI, conjunctiva clear, anicteric sclerae.    ENT:  Oropharynx is moist without exudates or ulcers.  NECK:  Supple.  LUNGS:  Breathing comfortably, on room air, clear to auscultation bilaterally, no w/r/r.  CARDIOVASCULAR:  Regular rate and rhythm, +S1S2 with no murmurs, gallops or rubs.  ABDOMEN:  Normal bowel sounds, soft, nontender. No appreciable hepatosplenomegaly.   : no suprapubic or flank tendterness.   EXT: Left AKA stump - covered in surgical dressing, reviewed picture uploaded in EMR today.   SKIN:  No acute rashes.    NEUROLOGIC:  Grossly nonfocal.      Lines and devices:   PIV is in place without any surrounding erythema.     Labs, Microbiology and Imaging studies are reviewed.   12/1 intra-op cxs VRE  11/29 wound cxs VRE, Daptomycin DSS (EVELIO 3), S- linezolid, tigecycline  11/29 blood cultures NGTD         Laboratory Data:     Hematology Studies    Recent Labs   Lab Test 12/02/23  1441 11/30/23  0556 11/29/23  1934 11/27/23  0617 11/23/23  1126 11/20/23  0617 10/27/23  1255 10/27/23  0548 10/26/23  1051 10/26/23  0700 10/24/23  0756 10/24/23  0651 10/23/23  0719 10/23/23  0555 10/22/23  1309 " 10/22/23  0422 10/21/23  0354   WBC 8.1 6.7 8.6 8.9 8.9 7.1   < > 11.5*   < > 13.2*   < > 12.4*  --  12.6*  --  11.3* 13.9*   ANEU  --   --   --   --   --   --   --  8.1  --  9.4*  --  9.5*  --  9.3*  --  8.4* 9.3*   AEOS  --   --   --   --   --   --   --  3.0*  --  2.4*  --  1.9*  --  2.4*  --  2.0* 1.8*   HGB 7.8* 8.3* 7.8* 8.0* 8.1* 7.8*   < > 8.4*   < > 8.4*   < > 5.8*   < > 6.8*   < > 7.1* 8.0*   MCV 97 95 95 96 96 96   < > 94   < > 96   < > 95  --  96  --  94 90   * 130* 157 143* 150 142*   < > 124*   < > 128*   < > 142*  --  185  --  167 159    < > = values in this interval not displayed.       Metabolic Studies     Recent Labs   Lab Test 12/02/23  1441 11/30/23  0556 11/27/23  0617 11/26/23  0826 11/25/23  0840 11/23/23  1126 11/20/23  0617   * 133* 133*  --   --  135 133*   POTASSIUM 4.5 4.4 4.3 4.3 4.5 4.9 4.6   CHLORIDE 104 102 102  --   --  101 100   CO2 17* 24 22  --   --  23 25   BUN 49.5* 51.4* 54.3*  --   --  56.7* 58.7*   CR 2.10* 2.00* 2.02*  --   --  2.01* 1.96*   GFRESTIMATED 33* 35* 35*  --   --  35* 36*       Hepatic Studies    Recent Labs   Lab Test 10/30/23  0651 10/29/23  0401 10/28/23  0525 10/27/23  0548 10/26/23  0700 10/25/23  0439   BILITOTAL 0.5 0.4 0.5 0.4 0.4 0.5   ALKPHOS 132* 128 133* 126 111 84   ALBUMIN 3.1* 3.0* 3.1* 3.0* 2.8* 3.1*   AST 24 25 34 38 52* 37   ALT 15 19 25 26 20 23       Urine Studies    Recent Labs   Lab Test 10/01/23  1049 09/30/23  1029 09/30/23  0648   LEUKEST Negative Negative Negative   WBCU 7* 7* 12*       Last check of C difficile  C Difficile Toxin B by PCR   Date Value Ref Range Status   11/06/2023 Negative Negative Final     Comment:     A negative result does not exclude actual disease due to C. difficile and may be due to improper collection, handling and storage of the specimen or the number of organisms in the specimen is below the detection limit of the assay.       Hepatitis B Testing   Recent Labs   Lab Test 10/27/23  1253  10/06/23  1543   HEPBANG Nonreactive Nonreactive         Microbiology:    Lab Results   Component Value Date    CULTURE No anaerobic organisms isolated after 1 day 12/01/2023    CULTURE Culture in progress 12/01/2023    CULTURE 1+ Enterococcus faecium (A) 12/01/2023

## 2023-12-03 NOTE — PLAN OF CARE
"/69   Pulse 77   Temp 98.4  F (36.9  C) (Oral)   Resp 16   SpO2 96%     9281-7911  BPs 70s-130s/50s-70s, OVSS on RA, no s/s of distress. BP down to 70s/50s 1.5hrs after evening dose of metoprolol given (given per parameters); BPs up to 110s/70s on later recheck--MD Diaz notified, no new orders but he will pass on to team to adjust BP meds if needed. A/Ox4, calm and cooperative with care, flat/withdrawn and pt admitting to feelings of sadness/frustration over prolonged hospital stay; pt eager to discharge and \"be home for [place].\" Sticky note left for team to assess need for psych/health psych consult. Denied pain, on scheduled tylenol. Abdominal incision covered with primapore, dressing CDI. AKA dressing CDI. Denied n/v--fair appetite, eating food from home; on regular diet. Up x1-2 with JOSÉ MANUEL, pivot to commode; otherwise used lift to assist pt to wheelchair and he was able to go off unit with his son and grandkids this evening (helped to improve pt mood). Voiding adequate amounts via urinal. No BMs this evening. PIV SL after IV daptomycin administration. ID consulted--pt will need abx plan prior to discharge. Pt spent most of shift visiting with family; sleeping now. Call light and belongings within reach. Continue POC/notify team as needed.   "

## 2023-12-03 NOTE — PROGRESS NOTES
Vascular Surgery Progress Note  12/03/2023       Subjective:  Patient feeling okay ,no fever, chills still. Wife at bedside. Surgical wound cultures also growing enterococcus  now.      Objective:  Temp:  [97.7  F (36.5  C)-98.4  F (36.9  C)] 98.3  F (36.8  C)  Pulse:  [76-97] 82  Resp:  [15-18] 15  BP: ()/(54-83) 102/71  SpO2:  [95 %-100 %] 98 %    I/O last 3 completed shifts:  In: 840 [P.O.:840]  Out: 900 [Urine:900]      Gen: Awake, alert, NAD  CV: RRR per radial pulse  Resp: NLB on RA  Abd: , soft, nondistended, nontender  Incision: abdominal incision  clean, dry, intact.   Ext: WWP, no edema.   LLE amputation site dressing changed, kerlix packed into wound, wrapped with kerlix and ACE. Bone is palpable in wound. No drainage, purulence.      Labs:  Recent Labs   Lab 12/02/23  1441 11/30/23  0556 11/29/23  1934   WBC 8.1 6.7 8.6   HGB 7.8* 8.3* 7.8*   * 130* 157       Recent Labs   Lab 12/02/23  1441 11/30/23  0556 11/27/23  0617   * 133* 133*   POTASSIUM 4.5 4.4 4.3   CHLORIDE 104 102 102   CO2 17* 24 22   BUN 49.5* 51.4* 54.3*   CR 2.10* 2.00* 2.02*   * 101* 116*   OMAR 9.1 9.5 9.5   MAG  --  1.9 1.9   PHOS  --  4.5 5.0*       Imaging:  Relevant imaging reviewed.      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 Now growing VRE on wound cultures from 11/29.     - Doing well s/p washout  - Pain controlled  - OK for reg diet  - ID consult given VRE, recommended starting daptomycin - now anticipating 6 week course, will need picc placement.   - Will plan to restart eliquis Monday tentatively, will continue to assess wound to eval if needs further debridement in OR.   - Vascular will do daily dressing changes at this time.     Seen and Discussed with vascular surgery staff, who is in agreement  with the above.  - - - - - - - - - - - - - - - - - -  Will Donald, PGY-3  Vascular Surgery Resident

## 2023-12-03 NOTE — PROVIDER NOTIFICATION
"Vascular surgery MD Ortiz was paged    \"Pt's Left AKA stump dressing (kerlix & ACE wrap) has fallen Off. Can I re-dress this with new kerlix and ACE wrap or would the team like to stop to assess.\"        *MD Ortiz called back. Took pictures of L AKA stump per MD and dressing changed per MD.   "

## 2023-12-03 NOTE — PLAN OF CARE
/75 (BP Location: Right arm)   Pulse 76   Temp 98.3  F (36.8  C) (Oral)   Resp 16   SpO2 97%     Shift: 8541-7818  VS: VSS, afebrile  Neuro: Aox4  BG: none   Respiratory: RA  Pain/Nausea: Denied   Diet: Regular  IV Access: PIV SL   Lines/Drains: none  GI/: Voiding. No BM this shift.   Skin: Abd incision with dressing. AKA with dressing.   Mobility: Ax2 pivot to BSC  Plan: Continue with POC and notify team of any changes

## 2023-12-04 ENCOUNTER — APPOINTMENT (OUTPATIENT)
Dept: PHYSICAL THERAPY | Facility: CLINIC | Age: 68
DRG: 464 | End: 2023-12-04
Attending: SURGERY
Payer: COMMERCIAL

## 2023-12-04 ENCOUNTER — DOCUMENTATION ONLY (OUTPATIENT)
Dept: ORTHOPEDICS | Facility: CLINIC | Age: 68
End: 2023-12-04

## 2023-12-04 ENCOUNTER — APPOINTMENT (OUTPATIENT)
Dept: GENERAL RADIOLOGY | Facility: CLINIC | Age: 68
DRG: 464 | End: 2023-12-04
Attending: NURSE PRACTITIONER
Payer: COMMERCIAL

## 2023-12-04 LAB
ANION GAP SERPL CALCULATED.3IONS-SCNC: 12 MMOL/L (ref 7–15)
APTT PPP: 57 SECONDS (ref 22–38)
BACTERIA BLD CULT: NO GROWTH
BACTERIA BLD CULT: NO GROWTH
BUN SERPL-MCNC: 48.5 MG/DL (ref 8–23)
CALCIUM SERPL-MCNC: 9.3 MG/DL (ref 8.8–10.2)
CHLORIDE SERPL-SCNC: 105 MMOL/L (ref 98–107)
CREAT SERPL-MCNC: 2.09 MG/DL (ref 0.67–1.17)
DEPRECATED HCO3 PLAS-SCNC: 16 MMOL/L (ref 22–29)
EGFRCR SERPLBLD CKD-EPI 2021: 33 ML/MIN/1.73M2
ERYTHROCYTE [DISTWIDTH] IN BLOOD BY AUTOMATED COUNT: 15.8 % (ref 10–15)
ERYTHROCYTE [DISTWIDTH] IN BLOOD BY AUTOMATED COUNT: 15.9 % (ref 10–15)
GLUCOSE SERPL-MCNC: 99 MG/DL (ref 70–99)
HCT VFR BLD AUTO: 24.5 % (ref 40–53)
HCT VFR BLD AUTO: 25 % (ref 40–53)
HGB BLD-MCNC: 7.7 G/DL (ref 13.3–17.7)
HGB BLD-MCNC: 8 G/DL (ref 13.3–17.7)
MAGNESIUM SERPL-MCNC: 1.9 MG/DL (ref 1.7–2.3)
MCH RBC QN AUTO: 30.7 PG (ref 26.5–33)
MCH RBC QN AUTO: 31.4 PG (ref 26.5–33)
MCHC RBC AUTO-ENTMCNC: 31.4 G/DL (ref 31.5–36.5)
MCHC RBC AUTO-ENTMCNC: 32 G/DL (ref 31.5–36.5)
MCV RBC AUTO: 98 FL (ref 78–100)
MCV RBC AUTO: 98 FL (ref 78–100)
PHOSPHATE SERPL-MCNC: 4.5 MG/DL (ref 2.5–4.5)
PLATELET # BLD AUTO: 113 10E3/UL (ref 150–450)
PLATELET # BLD AUTO: 127 10E3/UL (ref 150–450)
POTASSIUM SERPL-SCNC: 3.9 MMOL/L (ref 3.4–5.3)
RBC # BLD AUTO: 2.51 10E6/UL (ref 4.4–5.9)
RBC # BLD AUTO: 2.55 10E6/UL (ref 4.4–5.9)
SODIUM SERPL-SCNC: 133 MMOL/L (ref 135–145)
UFH PPP CHRO-ACNC: 0.44 IU/ML
WBC # BLD AUTO: 6.8 10E3/UL (ref 4–11)
WBC # BLD AUTO: 8.3 10E3/UL (ref 4–11)

## 2023-12-04 PROCEDURE — 250N000011 HC RX IP 250 OP 636: Mod: JZ | Performed by: STUDENT IN AN ORGANIZED HEALTH CARE EDUCATION/TRAINING PROGRAM

## 2023-12-04 PROCEDURE — 36569 INSJ PICC 5 YR+ W/O IMAGING: CPT

## 2023-12-04 PROCEDURE — 250N000011 HC RX IP 250 OP 636: Mod: JZ | Performed by: NURSE PRACTITIONER

## 2023-12-04 PROCEDURE — 84100 ASSAY OF PHOSPHORUS: CPT | Performed by: PHYSICIAN ASSISTANT

## 2023-12-04 PROCEDURE — 36415 COLL VENOUS BLD VENIPUNCTURE: CPT | Performed by: SURGERY

## 2023-12-04 PROCEDURE — 250N000013 HC RX MED GY IP 250 OP 250 PS 637: Performed by: NURSE PRACTITIONER

## 2023-12-04 PROCEDURE — 999N000065 XR CHEST PORT 1 VIEW

## 2023-12-04 PROCEDURE — 83735 ASSAY OF MAGNESIUM: CPT | Performed by: PHYSICIAN ASSISTANT

## 2023-12-04 PROCEDURE — 80048 BASIC METABOLIC PNL TOTAL CA: CPT | Performed by: PHYSICIAN ASSISTANT

## 2023-12-04 PROCEDURE — 99232 SBSQ HOSP IP/OBS MODERATE 35: CPT | Mod: 24 | Performed by: STUDENT IN AN ORGANIZED HEALTH CARE EDUCATION/TRAINING PROGRAM

## 2023-12-04 PROCEDURE — 120N000011 HC R&B TRANSPLANT UMMC

## 2023-12-04 PROCEDURE — 258N000003 HC RX IP 258 OP 636: Performed by: STUDENT IN AN ORGANIZED HEALTH CARE EDUCATION/TRAINING PROGRAM

## 2023-12-04 PROCEDURE — 85730 THROMBOPLASTIN TIME PARTIAL: CPT | Performed by: TRANSPLANT SURGERY

## 2023-12-04 PROCEDURE — 999N000128 HC STATISTIC PERIPHERAL IV START W/O US GUIDANCE

## 2023-12-04 PROCEDURE — 250N000011 HC RX IP 250 OP 636: Performed by: PHYSICIAN ASSISTANT

## 2023-12-04 PROCEDURE — 250N000009 HC RX 250: Performed by: NURSE PRACTITIONER

## 2023-12-04 PROCEDURE — 272N000451 HC KIT SHRLOCK 5FR POWER PICC DOUBLE LUMEN

## 2023-12-04 PROCEDURE — 36592 COLLECT BLOOD FROM PICC: CPT | Performed by: NURSE PRACTITIONER

## 2023-12-04 PROCEDURE — 85520 HEPARIN ASSAY: CPT | Performed by: NURSE PRACTITIONER

## 2023-12-04 PROCEDURE — 97530 THERAPEUTIC ACTIVITIES: CPT | Mod: GP

## 2023-12-04 PROCEDURE — 250N000011 HC RX IP 250 OP 636: Mod: JZ | Performed by: SURGERY

## 2023-12-04 PROCEDURE — 85041 AUTOMATED RBC COUNT: CPT | Performed by: SURGERY

## 2023-12-04 PROCEDURE — 85730 THROMBOPLASTIN TIME PARTIAL: CPT | Performed by: SURGERY

## 2023-12-04 PROCEDURE — 36592 COLLECT BLOOD FROM PICC: CPT | Performed by: TRANSPLANT SURGERY

## 2023-12-04 PROCEDURE — 99024 POSTOP FOLLOW-UP VISIT: CPT | Performed by: NURSE PRACTITIONER

## 2023-12-04 PROCEDURE — 85027 COMPLETE CBC AUTOMATED: CPT | Performed by: PHYSICIAN ASSISTANT

## 2023-12-04 PROCEDURE — 71045 X-RAY EXAM CHEST 1 VIEW: CPT | Mod: 26 | Performed by: RADIOLOGY

## 2023-12-04 PROCEDURE — 36415 COLL VENOUS BLD VENIPUNCTURE: CPT | Performed by: PHYSICIAN ASSISTANT

## 2023-12-04 PROCEDURE — 250N000013 HC RX MED GY IP 250 OP 250 PS 637: Performed by: PHYSICIAN ASSISTANT

## 2023-12-04 RX ORDER — BISACODYL 10 MG
10 SUPPOSITORY, RECTAL RECTAL DAILY PRN
Status: DISCONTINUED | OUTPATIENT
Start: 2023-12-04 | End: 2023-12-19 | Stop reason: HOSPADM

## 2023-12-04 RX ORDER — SODIUM HYPOCHLORITE 2.5 MG/ML
SOLUTION TOPICAL DAILY
Status: DISCONTINUED | OUTPATIENT
Start: 2023-12-04 | End: 2023-12-11

## 2023-12-04 RX ORDER — HYDROXYZINE HYDROCHLORIDE 25 MG/1
25 TABLET, FILM COATED ORAL EVERY 6 HOURS PRN
Status: DISCONTINUED | OUTPATIENT
Start: 2023-12-04 | End: 2023-12-04

## 2023-12-04 RX ORDER — SENNOSIDES 8.6 MG
1 TABLET ORAL 2 TIMES DAILY
Status: DISCONTINUED | OUTPATIENT
Start: 2023-12-04 | End: 2023-12-18

## 2023-12-04 RX ORDER — HEPARIN SODIUM,PORCINE 10 UNIT/ML
5-20 VIAL (ML) INTRAVENOUS EVERY 24 HOURS
Status: DISCONTINUED | OUTPATIENT
Start: 2023-12-04 | End: 2023-12-19 | Stop reason: HOSPADM

## 2023-12-04 RX ORDER — LIDOCAINE 40 MG/G
CREAM TOPICAL
Status: ACTIVE | OUTPATIENT
Start: 2023-12-04 | End: 2023-12-07

## 2023-12-04 RX ORDER — HEPARIN SODIUM,PORCINE 10 UNIT/ML
5-20 VIAL (ML) INTRAVENOUS
Status: DISCONTINUED | OUTPATIENT
Start: 2023-12-04 | End: 2023-12-19 | Stop reason: HOSPADM

## 2023-12-04 RX ORDER — HEPARIN SODIUM 10000 [USP'U]/100ML
0-5000 INJECTION, SOLUTION INTRAVENOUS CONTINUOUS
Status: DISCONTINUED | OUTPATIENT
Start: 2023-12-04 | End: 2023-12-12

## 2023-12-04 RX ORDER — CALCIUM CARBONATE 500 MG/1
500 TABLET, CHEWABLE ORAL 2 TIMES DAILY PRN
Status: DISCONTINUED | OUTPATIENT
Start: 2023-12-04 | End: 2023-12-04

## 2023-12-04 RX ORDER — HEPARIN SODIUM 10000 [USP'U]/100ML
0-5000 INJECTION, SOLUTION INTRAVENOUS CONTINUOUS
Status: DISCONTINUED | OUTPATIENT
Start: 2023-12-04 | End: 2023-12-04

## 2023-12-04 RX ORDER — SENNOSIDES 8.6 MG
2 TABLET ORAL 2 TIMES DAILY PRN
Status: DISCONTINUED | OUTPATIENT
Start: 2023-12-04 | End: 2023-12-04

## 2023-12-04 RX ADMIN — METOPROLOL TARTRATE 100 MG: 50 TABLET, FILM COATED ORAL at 08:55

## 2023-12-04 RX ADMIN — ACETAMINOPHEN 975 MG: 325 TABLET, FILM COATED ORAL at 19:49

## 2023-12-04 RX ADMIN — QUETIAPINE FUMARATE 75 MG: 25 TABLET ORAL at 19:50

## 2023-12-04 RX ADMIN — DAPTOMYCIN 700 MG: 500 INJECTION, POWDER, LYOPHILIZED, FOR SOLUTION INTRAVENOUS at 08:55

## 2023-12-04 RX ADMIN — LIDOCAINE HYDROCHLORIDE 1 ML: 10 INJECTION, SOLUTION EPIDURAL; INFILTRATION; INTRACAUDAL; PERINEURAL at 15:55

## 2023-12-04 RX ADMIN — ONDANSETRON 4 MG: 4 TABLET, ORALLY DISINTEGRATING ORAL at 19:40

## 2023-12-04 RX ADMIN — ATORVASTATIN CALCIUM 10 MG: 10 TABLET, FILM COATED ORAL at 19:50

## 2023-12-04 RX ADMIN — AMLODIPINE BESYLATE 10 MG: 10 TABLET ORAL at 08:55

## 2023-12-04 RX ADMIN — OXYCODONE HYDROCHLORIDE 5 MG: 5 TABLET ORAL at 20:06

## 2023-12-04 RX ADMIN — Medication 5 ML: at 18:22

## 2023-12-04 RX ADMIN — MELATONIN 3 MG: at 19:50

## 2023-12-04 RX ADMIN — ACETAMINOPHEN 975 MG: 325 TABLET, FILM COATED ORAL at 13:49

## 2023-12-04 RX ADMIN — Medication 60 ML: at 08:56

## 2023-12-04 RX ADMIN — SENNOSIDES 1 TABLET: 8.6 TABLET, FILM COATED ORAL at 08:55

## 2023-12-04 RX ADMIN — DOCUSATE SODIUM 50 MG: 50 CAPSULE, LIQUID FILLED ORAL at 10:05

## 2023-12-04 RX ADMIN — METOPROLOL TARTRATE 100 MG: 50 TABLET, FILM COATED ORAL at 19:50

## 2023-12-04 RX ADMIN — HEPARIN SODIUM 700 UNITS/HR: 10000 INJECTION, SOLUTION INTRAVENOUS at 11:19

## 2023-12-04 RX ADMIN — SENNOSIDES 1 TABLET: 8.6 TABLET, FILM COATED ORAL at 19:50

## 2023-12-04 RX ADMIN — PANTOPRAZOLE SODIUM 40 MG: 40 TABLET, DELAYED RELEASE ORAL at 18:22

## 2023-12-04 RX ADMIN — PSYLLIUM HUSK 1 PACKET: 3.4 POWDER ORAL at 08:55

## 2023-12-04 RX ADMIN — Medication 50 MCG: at 10:59

## 2023-12-04 ASSESSMENT — ACTIVITIES OF DAILY LIVING (ADL)
ADLS_ACUITY_SCORE: 45
ADLS_ACUITY_SCORE: 43
ADLS_ACUITY_SCORE: 45
ADLS_ACUITY_SCORE: 43
ADLS_ACUITY_SCORE: 43
ADLS_ACUITY_SCORE: 45
ADLS_ACUITY_SCORE: 43
ADLS_ACUITY_SCORE: 43

## 2023-12-04 NOTE — PLAN OF CARE
Physical Therapy Discharge Summary    Reason for therapy discharge:    Change in medical status.    Progress towards therapy goal(s). See goals on Care Plan in [facility] electronic health record for goal details.  Goals partially met.  Barriers to achieving goals:   discharge from facility.    Therapy recommendation(s):    Continued therapy is recommended.  Rationale/Recommendations:   . Pt discharging back to hospital per vascular surgery. Making good progress with slideboard transfers and pivot transfers. DME ordering in progress. Family training ongoing. Pt will benefit from continued therapy once medically able.

## 2023-12-04 NOTE — PROVIDER NOTIFICATION
12/04/23 1549   PICC 12/04/23 Double Lumen Right Basilic   Placement Date/Time: 12/04/23 (c) 1549   Lumens (Required): Double Lumen  Lumen Identification: Purple;Red  Catheter Brand: [employer]  Catheter Size: 5 fr  Lot #: [id number]  Description: Non - valved (open ended);Power PICC  Orientation: Right  Vein : (c) B...   Site Assessment WDL   External Cath Length (cm) (S)  1 cm   Extremity Circumference (cm) 24 cm   Dressing Chlorhexidine disk;Transparent;Securement device   Dressing Status clean;dry;intact   Dressing Change Due (S)  12/11/23   Line Necessity Yes, meets criteria   Purple - Status blood return noted;saline locked   Purple - Cap Change Due 12/08/23   Purple - Intervention Flushed   Red - Status blood return noted;saline locked   Red - Cap Change Due 12/08/23   Red - Intervention Flushed   Phlebitis Scale 0-->no symptoms   Infiltration? no   PICC Comment (S)  PICC is OK to use

## 2023-12-04 NOTE — PLAN OF CARE
/73 (BP Location: Right arm)   Pulse 82   Temp 97.7  F (36.5  C) (Oral)   Resp 15   SpO2 97%     Shift: 0993-4437  VS: VSS, afebrile  Neuro: Aox4  BG: none   Respiratory: RA  Pain/Nausea: Denied   Diet: Regular   IV Access: L PIV SL   Lines/Drains: none   GI/: Voiding without issues. No BM  Skin: Abdominal incision covered with dressing. AKA with ace rapping reinforced. ABD in place for weeping wound drainage.    Mobility:  Ax2 pivot to BSC   Plan: Continue with POC and notify team of any changes

## 2023-12-04 NOTE — CARE PLAN
/83 (BP Location: Left arm)   Pulse 88   Temp 98.5  F (36.9  C) (Oral)   Resp 16   SpO2 98%       Shift: 9538-2801  Isolation Status: Contact VRE  VS: VSS on RA, afebrile  Neuro: Aox4  Behaviors: calm and cooperative  Labs: PTT : 57  Respiratory: WDL  Cardiac: WDL  Pain/Nausea: denies  Diet: Regular  IV Access: LPIV, RPICC  Infusion(s): Heparin @ 700 units/hr  GI/: voids without difficulty, 1 BM today   Skin: abd incision with dressing,left AKA  Mobility: not OOB   Events/Education: PICC line placed today   Plan: continue plan of care

## 2023-12-04 NOTE — PROGRESS NOTES
Vascular Surgery Progress Note  12/04/2023       Subjective:  Slept well, frustrated but coming to terms with hospital stay.  Left stump incision dressing changed this morning.     Objective:  Temp:  [97.7  F (36.5  C)-98.3  F (36.8  C)] 98.3  F (36.8  C)  Pulse:  [74-90] 90  Resp:  [15] 15  BP: (102-123)/(70-82) 123/81  SpO2:  [97 %-98 %] 98 %    I/O last 3 completed shifts:  In: 840 [P.O.:840]  Out: 750 [Urine:750]      Gen: Awake, alert, NAD  CV: RRR per radial pulse  Resp: NLB on RA  Abd: , soft, nondistended, nontender  Incision: abdominal incision  clean, dry, intact.   Ext: WWP, no edema.   LLE amputation site dressing changed, kerlix packed into wound, wrapped with kerlix and ACE. Bone is palpable in wound. No drainage, purulence.      Labs:  Recent Labs   Lab 12/04/23  0513 12/02/23  1441 11/30/23  0556   WBC 6.8 8.1 6.7   HGB 7.7* 7.8* 8.3*   * 143* 130*       Recent Labs   Lab 12/04/23  0513 12/02/23  1441 11/30/23  0556   * 133* 133*   POTASSIUM 3.9 4.5 4.4   CHLORIDE 105 104 102   CO2 16* 17* 24   BUN 48.5* 49.5* 51.4*   CR 2.09* 2.10* 2.00*   GLC 99 116* 101*   OMAR 9.3 9.1 9.5   MAG 1.9  --  1.9   PHOS 4.5  --  4.5       Imaging:  Relevant imaging reviewed.      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 Now growing VRE on wound cultures from 11/29.     - Doing well s/p washout  - Pain controlled  - OK for reg diet  - Resumed prior meds (except eliquis)  - ID consult given VRE, recommended starting daptomycin - now anticipating 6 week course  - Picc placement   - Holding Eliquis for now, starting low-dose heparin drip without boluses.  - Vascular will do daily dressing changes at this time: Dakin's solution to be used for soaking daily dressing.     Discussed pt history, exam, assessment and  plan with Dr. Lowe of the vascular surgery service, who is in agreement with the above.    Miryam Carrasco, [facility]  Vascular Surgery  Pager: [phone]  [email or web address]  Send message or 10 digit call back number Securely via [employer] with the [employer] Web Console (learn more here)

## 2023-12-04 NOTE — PROGRESS NOTES
S: Pt's wife reports that [facility], She reports that they still have all of the product that I delivered to him back over at [facility] and when they return they will get the items back. She had no questions regarding prosthetics. Patient is still packing the distal end of his AKA wound and is still far from starting the prosthetic process.  Electronically signed John Paul Bae CPO, LPO.

## 2023-12-04 NOTE — PROCEDURES
[facility]    Double Lumen PICC Placement    Date/Time: 12/4/2023 3:49 PM    Performed by: Porfirio Fabian RN  Authorized by: Miryam Carrasco APRN CNP  Indications: Antibiotic.      UNIVERSAL PROTOCOL   Site Marked: Yes  Prior Images Obtained and Reviewed:  Yes  Required items: Required blood products, implants, devices and special equipment available    Patient identity confirmed:  Verbally with patient, hospital-assigned identification number, arm band and provided demographic data  Patient was reevaluated immediately before administering moderate or deep sedation or anesthesia  Confirmation Checklist:  Patient's identity using two indicators, procedure was appropriate and matched the consent or emergent situation, correct equipment/implants were available and relevant allergies  Time out: Immediately prior to the procedure a time out was called    Universal Protocol: the Joint [facility] Universal Protocol was followed    Preparation: Patient was prepped and draped in usual sterile fashion       ANESTHESIA    Anesthesia:  See MAR for details  Local Anesthetic:  Lidocaine 1% without epinephrine  Anesthetic Total (mL):  1      SEDATION    Patient Sedated: No        Preparation: skin prepped with ChloraPrep  Skin prep agent: skin prep agent completely dried prior to procedure  Sterile barriers: maximum sterile barriers were used: cap, mask, sterile gown, sterile gloves, and large sterile sheet  Hand hygiene: hand hygiene performed prior to central venous catheter insertion  Type of line used: PICC  Catheter type: double lumen  Lumen type: non-valved and power PICC  Lumen Identification: Purple and Red  Catheter size: 5 Fr  Brand: Bard  Lot number: [id number]  Placement method: venipuncture, MST, ultrasound and tip navigation system  Number of attempts: 1  Difficulty threading catheter: no  Successful placement: yes  Orientation: right    Location: basilic vein (vein  diameter - 0.39 cm)  Arm circumference: adults 10 cm  Extremity circumference: 24  Visible catheter length: 1  Total catheter length: 43  Dressing and securement: alcohol impregnated caps, chlorhexidine disc applied, transparent dressing, tissue adhesive, sterile dressing applied, statlock and site cleansed  Post procedure assessment: blood return through all ports, free fluid flow and placement verified by 3CG technology  PROCEDURE   Patient Tolerance:  Patient tolerated the procedure well with no immediate complicationsDescribe Procedure: PICC tip is in satisfactory location as verified by [employer]pin 3CG Tip Confirmation System. PICC is OK to use.  Disposal: sharps and needle count correct at the end of procedure, needles and guidewire disposed in sharps container

## 2023-12-04 NOTE — PROVIDER NOTIFICATION
"Vascular Surgery paged with the following:    \"[address] Burnham - Pt w/ L AKA, packing at site falling out. Would you like wound repacked, or should we simply cover with ABD and wrap with compression wrap? Thx! - Tracie [phone]\"    Awaiting reply.    Aria Lagunas RN    "

## 2023-12-04 NOTE — PROGRESS NOTES
Care Management Follow Up    Length of Stay (days): 3    Expected Discharge Date: 12/05/2023     Concerns to be Addressed: discharge planning     Patient plan of care discussed at interdisciplinary rounds: Yes    Anticipated Discharge Disposition: Acute Rehab     Anticipated Discharge Services: None  Anticipated Discharge DME: None    Patient/family educated on Medicare website which has current facility and service quality ratings: no  Education Provided on the Discharge Plan: Yes  Patient/Family in Agreement with the Plan: yes    Referrals Placed by CM/SW: Post Acute Facilities ( ARU)  Private pay costs discussed: Not applicable    Additional Information:  Per care team rounds pt and spouse had expressed desire to return home and not return to ARU. Pt requiring daily wound care, anticipate 6 week course of q 48 IV Daptomycin.      Met with pt and spouse. Introduced RNCC role. Per discussion with pt and spouse they want to return to ARU and complete rehab and receive education/training from ARU staff prior to returning home.  Pt and spouse note no concerns with learning to manage home IV antibiotics.      Paged Vascular Surgery team inquiring about estimated LOS and requesting OT consult.    Messaged Kathy,  Rehab Admissions Liaison with above information.    1430: Notified by JAQUELIN Su that pt and spouse voicing frustration about plan of care.  Writer met with pt and spouse.  PT/OT also present for discussion.  Per pt and spouse vascular surgery met with them this afternoon and informed pt that he may have another surgical procedure later this week and may require a follow up procedure next week.  Pt and spouse were not upset about the information they received from vascular surgery.  PT/OT noted per LOS and needs may progress to a home plan.  Pt and spouse remain open to a plan for ARU vs home.  Agreed to send referral to [facility] requesting confirmation of home IV antibiotic coverage.  Reviewed that  should pt progress to a home plan that home infusion liaison would assist with IV med administration education.  Pt and spouse note no concerns or questions at this time.      1450: Received call from Dr. Carrasco Vascular Surgery.  Vascular Surgery team still reviewing plan for patient. Pt may or may not require a surgical intervention.    Team anticipates pt will return to  ARU for on going medical management, rehab mid to end of week.  Writer holding on home infusion benefit check.      RNCC will continue to monitor.    Rose Mary Perales RN BSN, PHN, ACM-RN  7A RN Care Coordinator  Phone: [phone]  Pager [phone]    To contact the weekend RNCC  [place] (0800 [phone] 1630) Saturday and Sunday    Units: 5A,5B,5C [phone]    Units: 6A, [phone]    Units 6B, 6C, 6D [phone]    Units: 7A, 7B, 7C, 7D, [phone]    [facility] ([phone]) Saturday and Sunday  Units: 5 Ortho, 5MS & WB ED Pager: [phone]  Units: 6MS, 8A & 10 ICU  Pager [phone]    12/4/2023 11:16 AM

## 2023-12-04 NOTE — PLAN OF CARE
Blood pressure 118/80, pulse 87, temperature 98.2  F (36.8  C), temperature source Oral, resp. rate 16, SpO2 99%.  Goal Outcome Evaluation:A&Ox4,anxious,verbalized  hopelessness that will never leave this place.Pt is legally blind due to macular degeneration LS clear,BS+,pt. had x 2 medium size BM one formed one soft ,pt. is c/o constipation and rectal pain,tylenol given,requested suppository,NP notified.voided adequately,used urinal, abdominal incision clean and dry intact, dressing changed,left AKA wound stump dressing changed this morning by vascular surgery NP.dressing covered with ACE wraps.Heparin gtt started at 700 units per order.Pt will have PICC line placed this evening.Wife at bedside supportive with cares.Will continue to monitor.

## 2023-12-04 NOTE — PROGRESS NOTES
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 Eventually discharged to ARU, relatively doing well until 11/29, when noticed malodorous drainage from lateral side of stump incision, when malfunction of wound vac. Afebrile, hemodynamically stable. Pre-op cultures grew VRE, susceptibility reviewed (Daptomycin DSS (EVELIO 3), S- linezolid, tigecycline). Underwent I&D by vascular surgery. Intra-op cultures are obtained, also growing VRE pending susceptibility. Reviewed OP findings, and per discussion with primary team, debridement up to bone level. Based on this information, anticipate at least 4-6 weeks of antibiotic course. Started iv Daptomycin, and based on susceptibility data, suggest to increase dose. Other antibiotic options are limited, and would like to avoid at this time due to side effects profile (Linezolid would cause cytopenia, and tigecycline with nausea/vomiting) while treating for 6 weeks course.  Discussed the consideration of po linezolid towards end of treatment course, if stump site continues to heal well. However, for now, plan to continue iv daptomycin.     Reached out to [facility] vascular surgery to clarify if plan for repeat surgery who was  unaware. Writer to follow up tomorrow with day team, so can provide final ID plan.     Recommendations:  Continue iv Daptomycin 12mg/kg daily, needs renal dose adjustment.   Anticipate 6 weeks antibiotic course  Okay to place PICC line   Local wound care of stump site  Discussed OPAT plan and outpatient ID follow up. Defer to SW/care coordinator regarding insurance coverage, including [employer] benefits related inquiries.     Updated patient and family at bedside. Support provided by active listening.     Recommendations are discussed with the primary team.     Thank you for the consult. ID team will continue to follow. Please reach out if any questions or concerns.     Total time spent during this encounter (chart review, documentation, MDM, coordination of care): 40 minutes    Nidia Garcia MD   Infectious Diseases Staff Physician  Pager: [phone]  Enertiv anayeli   12/04/2023         Interval History and Events:     Seen and evaluated at bedside, accompanied by his wife. No new complaints. Voiced concerns and delays due to potential another surgery.          HPI as adopted from initial ID consult on 12/2/2023:     Alfredo Burnham is a 69 yo M with PMHx of HTN, 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, recent hospitalization (9/24 - 11/17, 2023) for AAA s/p open repair 9/24/2023, c/b LLE ischemia s/p unsuccessful thrombectomy s/p AKA (10/17/2023), MAYA requiring HD via R internal jugular tunneled and removal due to recovered renal function, embolic CVA, left iliac, psoas muscle hematoma, dysphonia 2/2 left vocal fold paralysis. Eventually discharged to ARU (11/17 - 11/30), and required to admit due to left AKA stump site infection. Alfredo was relatively doing well  until 11/29, when noted wound vac malfunction, and significant amount of serosanguineous, purulent appearing malodorous drainage, expressed out with pressure application, from an small opening on lateral aspect of stump incision. 11/29 Wound and blood cultures  "obtained and transferred to [facility]. Underwent I&D 12/1. Intra-op findings: \"producing again a large volume of brownish fluid....was a small amount of murky looking subcutaneous tissue\". Intra-op cultures were taken from the cavity as the fluid was emanating. 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 ID consult is requested for antibiotic management.      Social hx: Lives with wife (Tabby) in an apartment [place]. They do not have any pets in the home.          Review of Systems:   Targeted 10 point ROS was completed with pertinent positives and negatives are detailed above.         Antimicrobial history:     Daptomycin 12/2 - present         Physical Examination:   Temp:  [97.7  F (36.5  C)-98.3  F (36.8  C)] 98.2  F (36.8  C)  Pulse:  [74-96] 87  Resp:  [15-16] 16  BP: (103-123)/(70-88) 118/80  SpO2:  [97 %-99 %] 99 %    Exam:  GENERAL:  Well-developed, well-nourished, supine, in no acute distress.   Head: normocephalic, atraumatic.   EYES: PERRLA, EOMI, conjunctiva clear, anicteric sclerae.    ENT:  Oropharynx is moist without exudates or ulcers.  NECK:  Supple.  LUNGS:  Breathing comfortably, on room air, clear to auscultation bilaterally, no w/r/r.  CARDIOVASCULAR:  Regular rate and rhythm, +S1S2 with no murmurs, gallops or rubs.  ABDOMEN:  Normal bowel sounds, soft, nontender. No appreciable hepatosplenomegaly.   : no suprapubic or flank tendterness.   EXT: Left AKA stump - covered in surgical dressing  SKIN:  No acute rashes.    NEUROLOGIC:  Grossly nonfocal.      Lines and devices:   PIV is in place without any surrounding erythema.     Labs, Microbiology and Imaging studies are reviewed.   12/1 intra-op cxs VRE  11/29 wound cxs VRE, Daptomycin DSS (EVELIO 3), S- linezolid, tigecycline  11/29 blood cultures NGTD         Laboratory Data:     Hematology Studies    Recent Labs   Lab Test 12/04/23  1049 12/04/23  0513 12/02/23  1441 11/30/23  0556 11/29/23  1934 11/27/23  0617 " 10/27/23  1255 10/27/23  0548 10/26/23  1051 10/26/23  0700 10/24/23  0756 10/24/23  0651 10/23/23  0719 10/23/23  0555 10/22/23  1309 10/22/23  0422 10/21/23  0354   WBC 8.3 6.8 8.1 6.7 8.6 8.9   < > 11.5*   < > 13.2*   < > 12.4*  --  12.6*  --  11.3* 13.9*   ANEU  --   --   --   --   --   --   --  8.1  --  9.4*  --  9.5*  --  9.3*  --  8.4* 9.3*   AEOS  --   --   --   --   --   --   --  3.0*  --  2.4*  --  1.9*  --  2.4*  --  2.0* 1.8*   HGB 8.0* 7.7* 7.8* 8.3* 7.8* 8.0*   < > 8.4*   < > 8.4*   < > 5.8*   < > 6.8*   < > 7.1* 8.0*   MCV 98 98 97 95 95 96   < > 94   < > 96   < > 95  --  96  --  94 90   * 127* 143* 130* 157 143*   < > 124*   < > 128*   < > 142*  --  185  --  167 159    < > = values in this interval not displayed.       Metabolic Studies     Recent Labs   Lab Test 12/04/23  0513 12/02/23  1441 11/30/23  0556 11/27/23  0617 11/26/23  0826 11/25/23  0840 11/23/23  1126   * 133* 133* 133*  --   --  135   POTASSIUM 3.9 4.5 4.4 4.3 4.3   < > 4.9   CHLORIDE 105 104 102 102  --   --  101   CO2 16* 17* 24 22  --   --  23   BUN 48.5* 49.5* 51.4* 54.3*  --   --  56.7*   CR 2.09* 2.10* 2.00* 2.02*  --   --  2.01*   GFRESTIMATED 33* 33* 35* 35*  --   --  35*    < > = values in this interval not displayed.       Hepatic Studies    Recent Labs   Lab Test 10/30/23  0651 10/29/23  0401 10/28/23  0525 10/27/23  0548 10/26/23  0700 10/25/23  0439   BILITOTAL 0.5 0.4 0.5 0.4 0.4 0.5   ALKPHOS 132* 128 133* 126 111 84   ALBUMIN 3.1* 3.0* 3.1* 3.0* 2.8* 3.1*   AST 24 25 34 38 52* 37   ALT 15 19 25 26 20 23       Urine Studies    Recent Labs   Lab Test 10/01/23  1049 09/30/23  1029 09/30/23  0648   LEUKEST Negative Negative Negative   WBCU 7* 7* 12*       Last check of C difficile  C Difficile Toxin B by PCR   Date Value Ref Range Status   11/06/2023 Negative Negative Final     Comment:     A negative result does not exclude actual disease due to C. difficile and may be due to improper collection, handling and  storage of the specimen or the number of organisms in the specimen is below the detection limit of the assay.       Hepatitis B Testing   Recent Labs   Lab Test 10/27/23  1255 10/06/23  1543   HEPBANG Nonreactive Nonreactive         Microbiology:    Lab Results   Component Value Date    CULTURE No anaerobic organisms isolated after 1 day 12/01/2023    CULTURE 1+ Enterococcus faecium VRE (A) 12/01/2023

## 2023-12-05 ENCOUNTER — APPOINTMENT (OUTPATIENT)
Dept: PHYSICAL THERAPY | Facility: CLINIC | Age: 68
DRG: 464 | End: 2023-12-05
Attending: SURGERY
Payer: COMMERCIAL

## 2023-12-05 ENCOUNTER — APPOINTMENT (OUTPATIENT)
Dept: OCCUPATIONAL THERAPY | Facility: CLINIC | Age: 68
DRG: 464 | End: 2023-12-05
Attending: NURSE PRACTITIONER
Payer: COMMERCIAL

## 2023-12-05 ENCOUNTER — TELEPHONE (OUTPATIENT)
Dept: ALLERGY | Facility: CLINIC | Age: 68
End: 2023-12-05

## 2023-12-05 LAB
APTT PPP: 56 SECONDS (ref 22–38)
APTT PPP: 60 SECONDS (ref 22–38)

## 2023-12-05 PROCEDURE — 85730 THROMBOPLASTIN TIME PARTIAL: CPT | Performed by: SURGERY

## 2023-12-05 PROCEDURE — 99024 POSTOP FOLLOW-UP VISIT: CPT | Performed by: NURSE PRACTITIONER

## 2023-12-05 PROCEDURE — 250N000011 HC RX IP 250 OP 636: Mod: JZ | Performed by: NURSE PRACTITIONER

## 2023-12-05 PROCEDURE — 250N000011 HC RX IP 250 OP 636: Mod: JZ | Performed by: SURGERY

## 2023-12-05 PROCEDURE — 36592 COLLECT BLOOD FROM PICC: CPT | Performed by: SURGERY

## 2023-12-05 PROCEDURE — 250N000013 HC RX MED GY IP 250 OP 250 PS 637: Performed by: NURSE PRACTITIONER

## 2023-12-05 PROCEDURE — 97166 OT EVAL MOD COMPLEX 45 MIN: CPT | Mod: GO

## 2023-12-05 PROCEDURE — 250N000013 HC RX MED GY IP 250 OP 250 PS 637: Performed by: PHYSICIAN ASSISTANT

## 2023-12-05 PROCEDURE — 250N000011 HC RX IP 250 OP 636: Performed by: PHYSICIAN ASSISTANT

## 2023-12-05 PROCEDURE — 97530 THERAPEUTIC ACTIVITIES: CPT | Mod: GP

## 2023-12-05 PROCEDURE — 99232 SBSQ HOSP IP/OBS MODERATE 35: CPT | Mod: 24 | Performed by: STUDENT IN AN ORGANIZED HEALTH CARE EDUCATION/TRAINING PROGRAM

## 2023-12-05 PROCEDURE — 97535 SELF CARE MNGMENT TRAINING: CPT | Mod: GO

## 2023-12-05 PROCEDURE — 120N000011 HC R&B TRANSPLANT UMMC

## 2023-12-05 RX ORDER — CLINDAMYCIN PHOSPHATE 900 MG/50ML
900 INJECTION, SOLUTION INTRAVENOUS SEE ADMIN INSTRUCTIONS
Status: CANCELLED | OUTPATIENT
Start: 2023-12-05

## 2023-12-05 RX ORDER — CLINDAMYCIN PHOSPHATE 900 MG/50ML
900 INJECTION, SOLUTION INTRAVENOUS
Status: CANCELLED | OUTPATIENT
Start: 2023-12-05

## 2023-12-05 RX ADMIN — ONDANSETRON 4 MG: 4 TABLET, ORALLY DISINTEGRATING ORAL at 13:02

## 2023-12-05 RX ADMIN — ONDANSETRON 4 MG: 4 TABLET, ORALLY DISINTEGRATING ORAL at 17:26

## 2023-12-05 RX ADMIN — SODIUM HYPOCHLORITE: 2.5 SOLUTION TOPICAL at 09:30

## 2023-12-05 RX ADMIN — HEPARIN SODIUM 700 UNITS/HR: 10000 INJECTION, SOLUTION INTRAVENOUS at 19:28

## 2023-12-05 RX ADMIN — ONDANSETRON 4 MG: 4 TABLET, ORALLY DISINTEGRATING ORAL at 07:00

## 2023-12-05 RX ADMIN — Medication 50 MCG: at 12:15

## 2023-12-05 RX ADMIN — METOPROLOL TARTRATE 100 MG: 50 TABLET, FILM COATED ORAL at 08:54

## 2023-12-05 RX ADMIN — ATORVASTATIN CALCIUM 10 MG: 10 TABLET, FILM COATED ORAL at 19:52

## 2023-12-05 RX ADMIN — Medication 10 ML: at 16:07

## 2023-12-05 RX ADMIN — ACETAMINOPHEN 975 MG: 325 TABLET, FILM COATED ORAL at 13:59

## 2023-12-05 RX ADMIN — ACETAMINOPHEN 975 MG: 325 TABLET, FILM COATED ORAL at 19:51

## 2023-12-05 RX ADMIN — Medication 5 ML: at 08:38

## 2023-12-05 RX ADMIN — Medication 60 ML: at 08:52

## 2023-12-05 RX ADMIN — AMLODIPINE BESYLATE 10 MG: 10 TABLET ORAL at 08:54

## 2023-12-05 RX ADMIN — METOPROLOL TARTRATE 100 MG: 50 TABLET, FILM COATED ORAL at 19:52

## 2023-12-05 RX ADMIN — QUETIAPINE FUMARATE 75 MG: 25 TABLET ORAL at 19:55

## 2023-12-05 RX ADMIN — SENNOSIDES 1 TABLET: 8.6 TABLET, FILM COATED ORAL at 08:50

## 2023-12-05 RX ADMIN — MELATONIN 3 MG: at 19:51

## 2023-12-05 RX ADMIN — PANTOPRAZOLE SODIUM 40 MG: 40 TABLET, DELAYED RELEASE ORAL at 17:25

## 2023-12-05 ASSESSMENT — ACTIVITIES OF DAILY LIVING (ADL)
ADLS_ACUITY_SCORE: 41
ADLS_ACUITY_SCORE: 43
ADLS_ACUITY_SCORE: 41
PREVIOUS_RESPONSIBILITIES: MEAL PREP;HOUSEKEEPING;SHOPPING
ADLS_ACUITY_SCORE: 43

## 2023-12-05 NOTE — PROGRESS NOTES
12/05/23 0900   Appointment Info   Signing Clinician's Name / Credentials (OT) Valencia Meng, OTR/L   Rehab Comments (OT) [facility] LLE   Living Environment   People in Home spouse   Current Living Arrangements apartment   Home Accessibility wheelchair accessible   Transportation Anticipated family or friend will provide   Living Environment Comments Pt lives with spouse in senior living apartment in [place]. Walk in shower in bathroom, full size. Grab bars in shower and has shower chair for return home. Did not use equipment prior to AKA.   Self-Care   Usual Activity Tolerance good   Current Activity Tolerance moderate   Regular Exercise Yes   Activity/Exercise Type walking   Exercise Amount/Frequency 1 hr   Equipment Currently Used at Home shower chair;grab bar, tub/shower;grab bar, toilet;tub bench;commode chair  (pt previously did not use AD at baseline but spouse has assisted with obtaining shower chair, drop arm commode, slideboard and working on getting grab bars installed around toilet at home)   Fall history within last six months no   Activity/Exercise/Self-Care Comment Prior to AKA, pt reports IND w/ all ADLs and mobility, no AD use. Since AKA, pt Ax1-2 w/ OH lift and Ax1 slideboard transfers to w/c and BSC. Pt has assist from spouse at home and per order, OK for spouse to assist w/ slideboard transfers.   Instrumental Activities of Daily Living (IADL)   Previous Responsibilities meal prep;housekeeping;shopping   IADL Comments Pt reports being primary cook at baseline and sharing household responsibilities. Spouse able to assist/assume additional support with housekeeping tasks. Pt reports using a screen reader to navigate the computer and having adapted equipment for leisure activities. 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 Now growing VRE on wound cultures from 11/29.\" Pt admitted to ARU 11/17 for ongoing rehab, returned to [facility] 11/30.   Existing Precautions/Restrictions fall;abdominal;sternal;weight bearing   Left Upper Extremity (Weight-bearing Status) full weight-bearing (FWB)   Right Upper Extremity (Weight-bearing Status) full weight-bearing (FWB)   Left Lower Extremity (Weight-bearing Status) non weight-bearing (NWB)   Right Lower Extremity (Weight-bearing Status) full weight-bearing (FWB)   General Observations and Info Activity: Up with staff nursing assistance.  Wife also ok to assist slideboard transfers bed<>wheelchair only.  Ax1-2 with nursing for slide board transfers.   No lifting, pushing, or pulling greater than 10 pounds and no strenuous exercise for 4-6 weeks.   NWB LLE   Cognitive Status Examination   Orientation Status orientation to person, place and time   Cognitive Status Comments Pt alert and oriented, able to accurately recall place, date, month, year. Spouse reports pt typically very sharp during day, sometimes some difficulty with memory/confusion in evenings, attributing to fatigue.   Visual Perception   Visual Impairment/Limitations legally blind   Impact of Vision Impairment on Function (Vision) Pt legally blind at baseline d/t retinal dystrophy. Impacts ability to read, some self-cares   Sensory   Sensory Quick Adds sensation intact   Pain Assessment   Patient Currently in Pain No   Posture   Posture forward head position;protracted " shoulders   Range of Motion Comprehensive   General Range of Motion bilateral upper extremity ROM WFL   Comment, General Range of Motion BUE slightly reduced, WFL for ADL completion and transfers   Strength Comprehensive (MMT)   Comment, General Manual Muscle Testing (MMT) Assessment generally deconditioned, BUE grossly 4/5   Coordination   Coordination Comments Pt reports vision impacts coordination at baseline, has adapted equipment to assist with vision impairment   Bed Mobility   Comment (Bed Mobility) min-modA to transfer sidelying>seated EOB   Transfers   Transfer Comments Ax1-2 OH lift w/ nursing, Ax1 slideboard w/ spouse and therapy   Balance   Balance Comments Autumn seated balance initially progressing to SBA   Activities of Daily Living   BADL Assessment/Intervention bathing;lower body dressing;grooming;toileting   Bathing Assessment/Intervention   Comment, (Bathing) Per clinical judgement, modA w/ use of AD   Lower Body Dressing Assessment/Training   Comment, (Lower Body Dressing) Autumn from supine   Grooming Assessment/Training   Comment, (Grooming) set up A sitting EOB   Toileting   Comment, (Toileting) CGA slideboard transfer to [facility]   Clinical Impression   Criteria for Skilled Therapeutic Interventions Met (OT) Yes, treatment indicated   OT Diagnosis Decreased IND w/ ADLs and mobility   Influenced by the following impairments decreased strength, activity tolerance, fatigue, post op precautions, impaired vision   OT Problem List-Impairments impacting ADL problems related to;activity tolerance impaired;balance;mobility;range of motion (ROM);strength;post-surgical precautions;vision   Assessment of Occupational Performance 5 or more Performance Deficits   Identified Performance Deficits transfers, dressing, bathing, toileting, mobility, home mgmt   Planned Therapy Interventions (OT) ADL retraining;IADL retraining;strengthening;transfer training;home program guidelines;progressive activity/exercise    Clinical Decision Making Complexity (OT) detailed assessment/moderate complexity   Risk & Benefits of therapy have been explained evaluation/treatment results reviewed;care plan/treatment goals reviewed;risks/benefits reviewed;current/potential barriers reviewed;participants voiced agreement with care plan;participants included;patient   Clinical Impression Comments Pt is well below baseline level of function w/ complicated hospital course. Pt is generally deconditioned and with decreased activity tolerance. Will benefit from ongoing therapies to continue to progress safety and IND w/ ADLs and mobility   OT Total Evaluation Time   OT Eval, Moderate Complexity Minutes (70921) 10   OT Goals   Therapy Frequency (OT) 6 times/week   OT Predicted Duration/Target Date for Goal Attainment 01/03/24   OT: Upper Body Dressing Modified independent   OT: Lower Body Dressing Modified independent;within precautions   OT: Upper Body Bathing Supervision/stand-by assist;using adaptive equipment   OT: Lower Body Bathing Supervision/stand-by assist;using adaptive equipment   OT: Transfer Supervision/stand-by assist;with assistive device;within precautions   OT: Toilet Transfer/Toileting Modified independent;toilet transfer;cleaning and garment management;using adaptive equipment;within precautions   Self-Care/Home Management   Self-Care/Home Mgmt/ADL, Compensatory, Meal Prep Minutes (78556) 55   Symptoms Noted During/After Treatment (Meal Preparation/Planning Training) fatigue;increased pain   Treatment Detail/Skilled Intervention Pt greeted in supine, agreeable to therapy session. OT eval completed and treatment initiated. Pt reporting urgent need to use bedpan, unable to wait for placement. Pt able to roll bilaterally to assist with clean up, maxA to change bedding and for pericares. MD entering to change AKA dressing. Upon later return, facilitated IND and safety with transfers and self-cares. Pt completed bed mobility w/ modA to  "sit up at EOB, cues to scoot hips towards EOB to allow R foot to touch floor. Pt reporting increased dizziness and overall general weakness, AVSS. Pt requiring Autumn intermittently for seated balance, progressing to SBA as dizziness resolved. Pt donned t-shirt w/ CGA seated EOB, transferring back to supine w/ Autumn for LB dressing, Autumn to thread bilat feet through boxer leg openings. Autumn to return to seated EOB. With set up, pt completed oral cares from EOB. Spouse brought pt slideboard from home, expressing concern that it was smaller than one pt has been using at [facility] previously. [facility] set up A and Autumn to secure slideboard throughout, pt completed tx to commode positioned on R side. Rest break provided following, pt with slightly increased SOB and reporting fatigue. Pt agreeable to attempt slideboard transfer to L side, cues for hand placement and modA needed to return to EOB, primarily for garment mgmt and to secure slideboard. Pt demo'ing increased SOB, slight increase in pain in L stump, and reporting \"this way is so much harder\". Pt returned to supine, reporting frustration with generalized weakness and difficulty with tasks he was able to complete previously at ARU w/ less effort. Th provided therapeutic listening and validation, reminding pt that this is a different slide board than he has been using and first time transferring to new equipment. Th recommen pt continue to complete BUE HEP IND'ly in room, pt agreeable and with red theraband already at bedside. Th provided resistive band exercise handout and reviewed exercises briefly, pt reporting recalling from ARU. Spouse with questions about home set up and equipment needs including tub bench vs single stall shower chair. Th recommend pt practice with each during therapy to simulate home env and determine best fit.  Pt and spouse appreciative, left iwth all needs met and call light within reach.   OT Discharge Planning   OT Plan ADLs from w/c in bathroom; " slideboard transfers to various surfaces - w/c, commode and tub bench vs shower chair; bring dycem for commode/shower chair tx; BUE HEP   OT Discharge Recommendation (DC Rec) [facility]-Motivated patient will benefit from intensive, interdisciplinary therapy.  Anticipate will be able to tolerate 3 hours of therapy per day   OT Rationale for DC Rec Pt below baseline level of function, limited primarily due to generalized weakness, safety precautions, and deconditioning due to prolonged hospitalization. At this time, recommend return to ARU to continue to improve IND and safety with transfers, w/c mobility, ADLs, and overall activity tolerance in preparation for return to home env.   OT Brief overview of current status Autumn SB tx bed>drop arm commode   OT Equipment Needed at Discharge other (see comments)  (TBD)   Total Session Time   Timed Code Treatment Minutes 55   Total Session Time (sum of timed and untimed services) 65

## 2023-12-05 NOTE — TELEPHONE ENCOUNTER
Allergy clinic received message from Dr. Rey regarding a consult appt with Dr. Suárez to discuss hx of possible drug allergy reaction.    Spoke to this pt to try to get him scheduled for consult and possible testing but pt declined all appointments and stated he didn't feel as though he had a reaction to a medication. Informed him to call allergy clinic back if he decides at a later time he would like to at least discuss this with Dr. Suárez.

## 2023-12-05 NOTE — PLAN OF CARE
/71 (BP Location: Left arm)   Pulse 79   Temp 97.9  F (36.6  C) (Oral)   Resp 16   SpO2 97%     Shift: [id number]  Isolation Status: Contact VRE  VS: stable  on RA, afebrile  Neuro: Aox4  Behaviors: calm and cooperative   BG: None  Labs: pTT 60  Respiratory: WDL  Cardiac: WDL  Pain/Nausea: reports nausea   PRN: Zofran 2x   Diet: Regular   IV Access: L PIV, R PICC  Infusion(s): heparin @ 700   Lines/Drains:   GI/: voiding using urnal . No BM over shift   Skin: abd incision with dressing,left AKA   Mobility: Not OOB  Events/Education: n/a  Plan: Follow POC

## 2023-12-05 NOTE — PROVIDER NOTIFICATION
pt Alfredo Burnham ([id number])    2 RN managed Heparin protocols are ordered,   Anti -Xa and PTT. Are we supposed to follow both or could this be an error? as of now , we can only make changes based on the PTT just FYI

## 2023-12-05 NOTE — PROGRESS NOTES
/79 (BP Location: Left arm)   Pulse 85   Temp 99  F (37.2  C) (Oral)   Resp 16   SpO2 99%       [id number]  VSS on RA. A+Ox4. Wife at bedside, supportive. PTT, 56 this am. Recheck in am. PIV w/hep @700 units/hr. R PICC hep locked. Regular diet, poor appetite. Denies pain or nausea. Voiding, saving in urinal. 1 BM shift. Scheduled tylenol given. L AKA, daily dressing change done by team this am. Abdominal incision, dressing changed per orders. Emesis x1 (team aware) and zofran x2 for nausea. Up with 2 with lift. Pt worked with PT and OT. PT refused to go to chair. OOB activity encouraged. Will continue to monitor and notify team with changes.

## 2023-12-05 NOTE — PROGRESS NOTES
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 Eventually discharged to ARU, relatively doing well until 11/29, when noticed malodorous drainage from lateral side of stump incision, when malfunction of wound vac. Afebrile, hemodynamically stable. Pre-op cultures grew VRE, susceptibility reviewed (Daptomycin DSS (EVELIO 3), S- linezolid, tigecycline). Underwent I&D by vascular surgery. Intra-op cultures are obtained, also growing VRE pending susceptibility. Reviewed OP findings, and per discussion with primary team, debridement up to bone level. Based on this information, anticipate at least 4-6 weeks of antibiotic course. Started iv Daptomycin, and based on susceptibility data, suggest to increase dose. Other antibiotic options are limited, and would like to avoid at this time due to side effects profile (Linezolid would cause cytopenia, and tigecycline with nausea/vomiting) while treating for 6 weeks course.  Discussed the consideration of po linezolid towards end of treatment course, if stump site continues to heal well. However, for now, plan to continue iv daptomycin.     Discussed with vascular surgery team today, plan for additional surgery with revision of  stump for source control.     Recommendations:  Discussed with vascular surgery team, plan for another debridement followed by revision w/ closure.   Continue iv Daptomycin 12mg/kg daily, needs renal dose adjustment.   Anticipate 6 weeks antibiotic course  Exact date to be determined based on timing of surgery as above  Local wound care of stump site per surgery team    Updated patient and family at bedside. Support provided by active listening.     Recommendations are discussed with the primary team.     Thank you for the consult. ID team will continue to follow. Please reach out if any questions or concerns.     Total time spent during this encounter (chart review, documentation, MDM, coordination of care): 40 minutes    Nidia Garcia MD   Infectious Diseases Staff Physician  Pager: [phone]  Unfold anayeli   12/05/2023         Interval History and Events:     Seen and evaluated at bedside, accompanied by his wife. No new complaints. Though did experience cough earlier and noted 2 episodes of diarrhea.          HPI as adopted from initial ID consult on 12/2/2023:     Alfredo Burnham is a 69 yo M with PMHx of HTN, 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, recent hospitalization (9/24 - 11/17, 2023) for AAA s/p open repair 9/24/2023, c/b LLE ischemia s/p unsuccessful thrombectomy s/p AKA (10/17/2023), MAYA requiring HD via R internal jugular tunneled and removal due to recovered renal function, embolic CVA, left iliac, psoas muscle hematoma, dysphonia 2/2 left vocal fold paralysis. Eventually discharged to ARU (11/17 - 11/30), and required to admit due to left AKA stump site infection. Alfredo was relatively doing well  until 11/29, when noted wound vac malfunction, and significant amount of serosanguineous, purulent appearing malodorous drainage, expressed out with pressure application, from an small opening on lateral aspect of stump incision. 11/29 Wound and blood cultures obtained and transferred to [facility].  "Underwent I&D 12/1. Intra-op findings: \"producing again a large volume of brownish fluid....was a small amount of murky looking subcutaneous tissue\". Intra-op cultures were taken from the cavity as the fluid was emanating. 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 ID consult is requested for antibiotic management.      Social hx: Lives with wife (Tabby) in an apartment [place]. They do not have any pets in the home.          Review of Systems:   Targeted 10 point ROS was completed with pertinent positives and negatives are detailed above.         Antimicrobial history:     Daptomycin 12/2 - present         Physical Examination:   Temp:  [97.9  F (36.6  C)-99  F (37.2  C)] 99  F (37.2  C)  Pulse:  [79-97] 97  Resp:  [16] 16  BP: (111-141)/(71-88) 141/80  SpO2:  [97 %-99 %] 99 %    Exam:   GENERAL:  Well-developed, well-nourished, supine, in no acute distress.   Head: normocephalic, atraumatic.   EYES: PERRLA, EOMI, conjunctiva clear, anicteric sclerae.    ENT:  Oropharynx is moist without exudates or ulcers.  NECK:  Supple.  LUNGS:  Breathing comfortably, on room air, clear to auscultation bilaterally, no w/r/r.  CARDIOVASCULAR:  Regular rate and rhythm, +S1S2 with no murmurs, gallops or rubs.  ABDOMEN:  Normal bowel sounds, soft, nontender. No appreciable hepatosplenomegaly.   : no suprapubic or flank tendterness.   EXT: Left AKA stump - covered in surgical dressing  SKIN:  No acute rashes.    NEUROLOGIC:  Grossly nonfocal.      Lines and devices:   PIV is in place without any surrounding erythema.     Labs, Microbiology and Imaging studies are reviewed.   12/1 intra-op cxs VRE  11/29 wound cxs VRE, Daptomycin DSS (EVELIO 3), S- linezolid, tigecycline  11/29 blood cultures NGTD         Laboratory Data:     Hematology Studies    Recent Labs   Lab Test 12/04/23  1049 12/04/23  0513 12/02/23  1441 11/30/23  0556 11/29/23  1934 11/27/23  0617 10/27/23  1255 10/27/23  0548 10/26/23  1051 10/26/23  0700 " 10/24/23  0756 10/24/23  0651 10/23/23  0719 10/23/23  0555 10/22/23  1309 10/22/23  0422 10/21/23  0354   WBC 8.3 6.8 8.1 6.7 8.6 8.9   < > 11.5*   < > 13.2*   < > 12.4*  --  12.6*  --  11.3* 13.9*   ANEU  --   --   --   --   --   --   --  8.1  --  9.4*  --  9.5*  --  9.3*  --  8.4* 9.3*   AEOS  --   --   --   --   --   --   --  3.0*  --  2.4*  --  1.9*  --  2.4*  --  2.0* 1.8*   HGB 8.0* 7.7* 7.8* 8.3* 7.8* 8.0*   < > 8.4*   < > 8.4*   < > 5.8*   < > 6.8*   < > 7.1* 8.0*   MCV 98 98 97 95 95 96   < > 94   < > 96   < > 95  --  96  --  94 90   * 127* 143* 130* 157 143*   < > 124*   < > 128*   < > 142*  --  185  --  167 159    < > = values in this interval not displayed.       Metabolic Studies     Recent Labs   Lab Test 12/04/23  0513 12/02/23  1441 11/30/23  0556 11/27/23  0617 11/26/23  0826 11/25/23  0840 11/23/23  1126   * 133* 133* 133*  --   --  135   POTASSIUM 3.9 4.5 4.4 4.3 4.3   < > 4.9   CHLORIDE 105 104 102 102  --   --  101   CO2 16* 17* 24 22  --   --  23   BUN 48.5* 49.5* 51.4* 54.3*  --   --  56.7*   CR 2.09* 2.10* 2.00* 2.02*  --   --  2.01*   GFRESTIMATED 33* 33* 35* 35*  --   --  35*    < > = values in this interval not displayed.       Hepatic Studies    Recent Labs   Lab Test 10/30/23  0651 10/29/23  0401 10/28/23  0525 10/27/23  0548 10/26/23  0700 10/25/23  0439   BILITOTAL 0.5 0.4 0.5 0.4 0.4 0.5   ALKPHOS 132* 128 133* 126 111 84   ALBUMIN 3.1* 3.0* 3.1* 3.0* 2.8* 3.1*   AST 24 25 34 38 52* 37   ALT 15 19 25 26 20 23       Urine Studies    Recent Labs   Lab Test 10/01/23  1049 09/30/23  1029 09/30/23  0648   LEUKEST Negative Negative Negative   WBCU 7* 7* 12*       Last check of C difficile  C Difficile Toxin B by PCR   Date Value Ref Range Status   11/06/2023 Negative Negative Final     Comment:     A negative result does not exclude actual disease due to C. difficile and may be due to improper collection, handling and storage of the specimen or the number of organisms in the  specimen is below the detection limit of the assay.       Hepatitis B Testing   Recent Labs   Lab Test 10/27/23  1255 10/06/23  1543   HEPBANG Nonreactive Nonreactive         Microbiology:    Lab Results   Component Value Date    CULTURE No anaerobic organisms isolated after 1 day 12/01/2023    CULTURE 1+ Enterococcus faecium VRE (A) 12/01/2023

## 2023-12-05 NOTE — PROGRESS NOTES
Vascular Surgery Progress Note  12/05/2023       Subjective:  Feeling well, denies pain.  Waiting for his wife discharged to visit him.  Left stump incision dressing changed this morning.     Objective:  Temp:  [97.9  F (36.6  C)-99  F (37.2  C)] 99  F (37.2  C)  Pulse:  [79-97] 97  Resp:  [16] 16  BP: (111-141)/(71-88) 141/80  SpO2:  [97 %-99 %] 99 %    I/O last 3 completed shifts:  In: -   Out: 830 [Urine:830]      Gen: Awake, alert, NAD  CV: RRR per radial pulse  Resp: NLB on RA  Abd: , soft, nondistended, nontender  Incision: abdominal incision  clean, dry, intact.   Ext: WWP, no edema.   LLE amputation site dressing changed, kerlix packed into wound, wrapped with kerlix and ACE. Bone is palpable in wound. moderate drainage.                 Labs:  Recent Labs   Lab 12/04/23  1049 12/04/23  0513 12/02/23  1441   WBC 8.3 6.8 8.1   HGB 8.0* 7.7* 7.8*   * 127* 143*       Recent Labs   Lab 12/04/23  0513 12/02/23  1441 11/30/23  0556   * 133* 133*   POTASSIUM 3.9 4.5 4.4   CHLORIDE 105 104 102   CO2 16* 17* 24   BUN 48.5* 49.5* 51.4*   CR 2.09* 2.10* 2.00*   GLC 99 116* 101*   OMAR 9.3 9.1 9.5   MAG 1.9  --  1.9   PHOS 4.5  --  4.5       Imaging:  Relevant imaging reviewed.      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 Now growing VRE on wound cultures from 11/29.     - Doing well s/p washout  - Pain controlled  - OK for reg diet  - Resumed prior meds (except eliquis)  - ID consult given VRE, recommended starting daptomycin - now anticipating 6 week course. Picc placed 12/4/23  - Holding Eliquis for now, starting low-dose heparin drip without boluses.  - Vascular will do daily dressing changes at this time: Dakin's solution to be used for soaking daily dressing.    Plan for potential OR Friday for first  stage stump revision.      Discussed pt history, exam, assessment and plan with Dr. Lowe of the vascular surgery service, who is in agreement with the above.    Miryam Carrasco, SACHIN  Vascular Surgery  Pager: [phone]  [email or web address]  Send message or 10 digit call back number Securely via [employer] with the [employer] Web Console (learn more here)

## 2023-12-06 ENCOUNTER — APPOINTMENT (OUTPATIENT)
Dept: OCCUPATIONAL THERAPY | Facility: CLINIC | Age: 68
DRG: 464 | End: 2023-12-06
Attending: SURGERY
Payer: COMMERCIAL

## 2023-12-06 ENCOUNTER — APPOINTMENT (OUTPATIENT)
Dept: PHYSICAL THERAPY | Facility: CLINIC | Age: 68
DRG: 464 | End: 2023-12-06
Attending: SURGERY
Payer: COMMERCIAL

## 2023-12-06 LAB
APTT PPP: 55 SECONDS (ref 22–38)
APTT PPP: 92 SECONDS (ref 22–38)
HOLD SPECIMEN: NORMAL
HOLD SPECIMEN: NORMAL
INR PPP: 1.27 (ref 0.85–1.15)
UFH PPP CHRO-ACNC: 0.13 IU/ML

## 2023-12-06 PROCEDURE — 85610 PROTHROMBIN TIME: CPT | Performed by: SURGERY

## 2023-12-06 PROCEDURE — 97535 SELF CARE MNGMENT TRAINING: CPT | Mod: GO

## 2023-12-06 PROCEDURE — 250N000011 HC RX IP 250 OP 636: Mod: JZ | Performed by: NURSE PRACTITIONER

## 2023-12-06 PROCEDURE — 258N000003 HC RX IP 258 OP 636: Performed by: STUDENT IN AN ORGANIZED HEALTH CARE EDUCATION/TRAINING PROGRAM

## 2023-12-06 PROCEDURE — 999N000007 HC SITE CHECK

## 2023-12-06 PROCEDURE — 97116 GAIT TRAINING THERAPY: CPT | Mod: GP

## 2023-12-06 PROCEDURE — 250N000013 HC RX MED GY IP 250 OP 250 PS 637: Performed by: PHYSICIAN ASSISTANT

## 2023-12-06 PROCEDURE — 85730 THROMBOPLASTIN TIME PARTIAL: CPT | Performed by: SURGERY

## 2023-12-06 PROCEDURE — 250N000011 HC RX IP 250 OP 636: Mod: JZ | Performed by: STUDENT IN AN ORGANIZED HEALTH CARE EDUCATION/TRAINING PROGRAM

## 2023-12-06 PROCEDURE — 97530 THERAPEUTIC ACTIVITIES: CPT | Mod: GP

## 2023-12-06 PROCEDURE — 120N000011 HC R&B TRANSPLANT UMMC

## 2023-12-06 PROCEDURE — 99232 SBSQ HOSP IP/OBS MODERATE 35: CPT | Mod: 24 | Performed by: STUDENT IN AN ORGANIZED HEALTH CARE EDUCATION/TRAINING PROGRAM

## 2023-12-06 PROCEDURE — 85520 HEPARIN ASSAY: CPT | Performed by: SURGERY

## 2023-12-06 PROCEDURE — 250N000013 HC RX MED GY IP 250 OP 250 PS 637: Performed by: NURSE PRACTITIONER

## 2023-12-06 PROCEDURE — 36592 COLLECT BLOOD FROM PICC: CPT | Performed by: SURGERY

## 2023-12-06 PROCEDURE — 99024 POSTOP FOLLOW-UP VISIT: CPT | Performed by: NURSE PRACTITIONER

## 2023-12-06 PROCEDURE — 97530 THERAPEUTIC ACTIVITIES: CPT | Mod: GO

## 2023-12-06 RX ADMIN — ACETAMINOPHEN 975 MG: 325 TABLET, FILM COATED ORAL at 20:52

## 2023-12-06 RX ADMIN — ACETAMINOPHEN 975 MG: 325 TABLET, FILM COATED ORAL at 15:03

## 2023-12-06 RX ADMIN — METOPROLOL TARTRATE 100 MG: 50 TABLET, FILM COATED ORAL at 20:55

## 2023-12-06 RX ADMIN — QUETIAPINE FUMARATE 75 MG: 25 TABLET ORAL at 20:54

## 2023-12-06 RX ADMIN — Medication 5 ML: at 05:50

## 2023-12-06 RX ADMIN — PANTOPRAZOLE SODIUM 40 MG: 40 TABLET, DELAYED RELEASE ORAL at 17:58

## 2023-12-06 RX ADMIN — AMLODIPINE BESYLATE 10 MG: 10 TABLET ORAL at 08:54

## 2023-12-06 RX ADMIN — ATORVASTATIN CALCIUM 10 MG: 10 TABLET, FILM COATED ORAL at 20:57

## 2023-12-06 RX ADMIN — Medication 50 MCG: at 12:46

## 2023-12-06 RX ADMIN — ACETAMINOPHEN 975 MG: 325 TABLET, FILM COATED ORAL at 08:54

## 2023-12-06 RX ADMIN — SENNOSIDES 1 TABLET: 8.6 TABLET, FILM COATED ORAL at 20:55

## 2023-12-06 RX ADMIN — Medication 10 ML: at 16:04

## 2023-12-06 RX ADMIN — DAPTOMYCIN 700 MG: 500 INJECTION, POWDER, LYOPHILIZED, FOR SOLUTION INTRAVENOUS at 08:53

## 2023-12-06 RX ADMIN — METOPROLOL TARTRATE 100 MG: 50 TABLET, FILM COATED ORAL at 08:55

## 2023-12-06 RX ADMIN — MELATONIN 3 MG: at 20:56

## 2023-12-06 ASSESSMENT — ACTIVITIES OF DAILY LIVING (ADL)
ADLS_ACUITY_SCORE: 43
ADLS_ACUITY_SCORE: 47
ADLS_ACUITY_SCORE: 43
ADLS_ACUITY_SCORE: 43
ADLS_ACUITY_SCORE: 47
ADLS_ACUITY_SCORE: 43
ADLS_ACUITY_SCORE: 47
ADLS_ACUITY_SCORE: 43

## 2023-12-06 NOTE — PLAN OF CARE
AVSS on RA.  A/Ox4.  Legally blind.  Denies pain and nausea.  Heparin infusion @ 500 units/hr.  Hep 10A recheck was due at 14:50,  came but could not draw from PICC line sois sending someone else.  Voids via urinal.  AKA dressing change this morning by provider CDI.  Up with lA2 and lift.  Continue plan of care.

## 2023-12-06 NOTE — PLAN OF CARE
BP 97/67 (BP Location: Left arm)   Pulse 77   Temp 98.3  F (36.8  C) (Oral)   Resp 16   SpO2 98%     Shift: 4181-1981  Isolation Status: Contact VRE  VS: stable  on RA, afebrile  Neuro: Aox4  Behaviors: calm and cooperative   BG: None  Labs: pTT 60  Respiratory: WDL  Cardiac: WDL  Pain/Nausea: reports nausea   PRN: Zofran 2x   Diet: Regular   IV Access: L PIV, R PICC  Infusion(s): heparin @ 700   Lines/Drains:   GI/: voiding using urnal . No BM over shift   Skin: abd incision with dressing,left AKA   Mobility: Not OOB  Events/Education: n/a  Plan: Follow POC

## 2023-12-06 NOTE — PROGRESS NOTES
Vascular Surgery Progress Note  12/06/2023       Subjective:  No acute events overnight     Objective:  Temp:  [98.1  F (36.7  C)-99.2  F (37.3  C)] 98.1  F (36.7  C)  Pulse:  [77-93] 90  Resp:  [16-18] 16  BP: ()/(67-88) 103/68  SpO2:  [97 %-100 %] 98 %    I/O last 3 completed shifts:  In: 0   Out: 530 [Urine:530]      Gen: Awake, alert, NAD  CV: RRR per radial pulse  Resp: NLB on RA  Abd: , soft, nondistended, nontender  Incision: abdominal incision  clean, dry, intact.   Ext: WWP, no edema.   LLE amputation site dressing changed, kerlix packed into wound, wrapped with kerlix and ACE. Bone is palpable in wound. moderate drainage.             Labs:  Recent Labs   Lab 12/04/23  1049 12/04/23  0513 12/02/23  1441   WBC 8.3 6.8 8.1   HGB 8.0* 7.7* 7.8*   * 127* 143*       Recent Labs   Lab 12/04/23  0513 12/02/23  1441 11/30/23  0556   * 133* 133*   POTASSIUM 3.9 4.5 4.4   CHLORIDE 105 104 102   CO2 16* 17* 24   BUN 48.5* 49.5* 51.4*   CR 2.09* 2.10* 2.00*   GLC 99 116* 101*   OMAR 9.3 9.1 9.5   MAG 1.9  --  1.9   PHOS 4.5  --  4.5        Assessment/Plan:   68 year old male well known to vascular surgery w/ h/o rutpured pararenal aneurysm s/p open repair in September of this year who was found to have drainage from his stump site concerning for infection now s/p washout on 12/1. Now growing VRE on wound cultures from 11/29. Given recent cultures, plan for return to the OR on Friday on 12/8/2023 for first stage of stump revision.    - Pain controlled  - OK for reg diet  - Resumed prior meds (except eliquis)  - ID following given VRE, recommended starting daptomycin - anticipating 6 week course. Picc placed 12/4/23.  Vascular surgery will send for culture for Gram stain/pathology.  - Holding Eliquis for now, on low-dose heparin drip without boluses.  - Vascular will do daily dressing changes at this time: Dakin's solution to be used for soaking daily dressing.    Plan for potential OR Friday for first  stage stump revision. Will need to be NPO after midnight on Thursday 12/7/23     Discussed pt history, exam, assessment and plan with Dr. Lowe of the vascular surgery service, who is in agreement with the above.    Miryam Carrasco [facility]  Vascular Surgery  Pager: [phone]  [email or web address]  Send message or 10 digit call back number Securely via [employer]â„¢ with the [employer]â„¢ Web Console (learn more here)

## 2023-12-06 NOTE — PROGRESS NOTES
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 Eventually discharged to ARU, relatively doing well until 11/29, when noticed malodorous drainage from lateral side of stump incision, when malfunction of wound vac. Afebrile, hemodynamically stable. Pre-op cultures grew VRE, susceptibility reviewed (Daptomycin DSS (EVELIO 3), S- linezolid, tigecycline). Underwent I&D by vascular surgery. Intra-op cultures are obtained, also growing VRE pending susceptibility. Reviewed OP findings, and per discussion with primary team, debridement up to bone level. Based on this information, anticipate at least 4-6 weeks of antibiotic course. Started iv Daptomycin, and based on susceptibility data, suggest to increase dose. Other antibiotic options are limited, and would like to avoid at this time due to side effects profile (Linezolid would cause cytopenia, and tigecycline with nausea/vomiting) while treating for 6 weeks course.  Discussed the consideration of po linezolid towards end of treatment course, if stump site continues to heal well. However, for now, plan to continue iv daptomycin.     Examined the stump site along with vascular surgery, no obvious  drainage or foul odor. Plan for additional surgery with revision of stump for source control, scheduled for Friday 12/8.     Recommendations (no new changes today):   Discussed with vascular surgery team, plan for another debridement followed by revision w/ closure.   Continue iv Daptomycin 12mg/kg daily, needs renal dose adjustment.   Anticipate 6 weeks antibiotic course  Exact date to be determined based on timing of surgery as above  Local wound care of stump site per surgery team    Updated patient and family at bedside. Support provided by active listening.     Recommendations are discussed with the primary team.     Thank you for the consult. ID team will continue to follow. Please reach out if any questions or concerns.     Total time spent during this encounter (chart review, documentation, MDM, coordination of care): 40 minutes    Nidia Garcia MD   Infectious Diseases Staff Physician  Pager: [phone]  Mindmancer anayeli   12/06/2023         Interval History and Events:     Seen and evaluated at bedside, accompanied by his wife. No new complaints. Examined stump along with vascular surgery team.          HPI as adopted from initial ID consult on 12/2/2023:     Alfredo Burnham is a 67 yo M with PMHx of HTN, 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, recent hospitalization (9/24 - 11/17, 2023) for AAA s/p open repair 9/24/2023, c/b LLE ischemia s/p unsuccessful thrombectomy s/p AKA (10/17/2023), MAYA requiring HD via R internal jugular tunneled and removal due to recovered renal function, embolic CVA, left iliac, psoas muscle hematoma, dysphonia 2/2 left vocal fold paralysis. Eventually discharged to ARU (11/17 - 11/30), and required to admit due to left AKA stump site infection. Alfredo was relatively doing well  until 11/29, when noted wound vac malfunction, and significant amount of serosanguineous, purulent appearing malodorous drainage, expressed out with pressure application, from an small opening on lateral aspect  "of stump incision. 11/29 Wound and blood cultures obtained and transferred to [facility]. Underwent I&D 12/1. Intra-op findings: \"producing again a large volume of brownish fluid....was a small amount of murky looking subcutaneous tissue\". Intra-op cultures were taken from the cavity as the fluid was emanating. 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 ID consult is requested for antibiotic management.      Social hx: Lives with wife (Tabby) in an apartment [place]. They do not have any pets in the home.          Review of Systems:   Targeted 10 point ROS was completed with pertinent positives and negatives are detailed above.         Antimicrobial history:     Daptomycin 12/2 - present         Physical Examination:   Temp:  [98.2  F (36.8  C)-99.2  F (37.3  C)] 98.2  F (36.8  C)  Pulse:  [77-93] 90  Resp:  [16-18] 16  BP: ()/(67-88) 137/88  SpO2:  [97 %-100 %] 100 %    Exam:   GENERAL:  Well-developed, well-nourished, supine, in no acute distress.   Head: normocephalic, atraumatic.   EYES: PERRLA, EOMI, conjunctiva clear, anicteric sclerae.    ENT:  Oropharynx is moist without exudates or ulcers.  NECK:  Supple.  LUNGS:  Breathing comfortably, on room air, clear to auscultation bilaterally, no w/r/r.  CARDIOVASCULAR:  Regular rate and rhythm, +S1S2 with no murmurs, gallops or rubs.  ABDOMEN:  Normal bowel sounds, soft, nontender. No appreciable hepatosplenomegaly.   : no suprapubic or flank tendterness.   EXT: Left AKA stump - examined the wound, pictures uploaded under media tab  SKIN:  No acute rashes.    NEUROLOGIC:  Grossly nonfocal.      Lines and devices:   RUE PICC and PIV in place without any surrounding erythema.     Labs, Microbiology and Imaging studies are reviewed.   12/1 intra-op cxs VRE  11/29 wound cxs VRE, Daptomycin DSS (EVELIO 3), S- linezolid, tigecycline  11/29 blood cultures NGTD         Laboratory Data:     Hematology Studies    Recent Labs   Lab Test 12/04/23  1049 " 12/04/23  0513 12/02/23  1441 11/30/23  0556 11/29/23  1934 11/27/23  0617 10/27/23  1255 10/27/23  0548 10/26/23  1051 10/26/23  0700 10/24/23  0756 10/24/23  0651 10/23/23  0719 10/23/23  0555 10/22/23  1309 10/22/23  0422 10/21/23  0354   WBC 8.3 6.8 8.1 6.7 8.6 8.9   < > 11.5*   < > 13.2*   < > 12.4*  --  12.6*  --  11.3* 13.9*   ANEU  --   --   --   --   --   --   --  8.1  --  9.4*  --  9.5*  --  9.3*  --  8.4* 9.3*   AEOS  --   --   --   --   --   --   --  3.0*  --  2.4*  --  1.9*  --  2.4*  --  2.0* 1.8*   HGB 8.0* 7.7* 7.8* 8.3* 7.8* 8.0*   < > 8.4*   < > 8.4*   < > 5.8*   < > 6.8*   < > 7.1* 8.0*   MCV 98 98 97 95 95 96   < > 94   < > 96   < > 95  --  96  --  94 90   * 127* 143* 130* 157 143*   < > 124*   < > 128*   < > 142*  --  185  --  167 159    < > = values in this interval not displayed.       Metabolic Studies     Recent Labs   Lab Test 12/04/23  0513 12/02/23  1441 11/30/23  0556 11/27/23  0617 11/26/23  0826 11/25/23  0840 11/23/23  1126   * 133* 133* 133*  --   --  135   POTASSIUM 3.9 4.5 4.4 4.3 4.3   < > 4.9   CHLORIDE 105 104 102 102  --   --  101   CO2 16* 17* 24 22  --   --  23   BUN 48.5* 49.5* 51.4* 54.3*  --   --  56.7*   CR 2.09* 2.10* 2.00* 2.02*  --   --  2.01*   GFRESTIMATED 33* 33* 35* 35*  --   --  35*    < > = values in this interval not displayed.       Hepatic Studies    Recent Labs   Lab Test 10/30/23  0651 10/29/23  0401 10/28/23  0525 10/27/23  0548 10/26/23  0700 10/25/23  0439   BILITOTAL 0.5 0.4 0.5 0.4 0.4 0.5   ALKPHOS 132* 128 133* 126 111 84   ALBUMIN 3.1* 3.0* 3.1* 3.0* 2.8* 3.1*   AST 24 25 34 38 52* 37   ALT 15 19 25 26 20 23       Urine Studies    Recent Labs   Lab Test 10/01/23  1049 09/30/23  1029 09/30/23  0648   LEUKEST Negative Negative Negative   WBCU 7* 7* 12*       Last check of C difficile  C Difficile Toxin B by PCR   Date Value Ref Range Status   11/06/2023 Negative Negative Final     Comment:     A negative result does not exclude actual  disease due to C. difficile and may be due to improper collection, handling and storage of the specimen or the number of organisms in the specimen is below the detection limit of the assay.       Hepatitis B Testing   Recent Labs   Lab Test 10/27/23  1255 10/06/23  1543   HEPBANG Nonreactive Nonreactive         Microbiology:    Lab Results   Component Value Date    CULTURE No anaerobic organisms isolated after 1 day 12/01/2023    CULTURE 1+ Enterococcus faecium VRE (A) 12/01/2023

## 2023-12-07 ENCOUNTER — APPOINTMENT (OUTPATIENT)
Dept: PHYSICAL THERAPY | Facility: CLINIC | Age: 68
DRG: 464 | End: 2023-12-07
Attending: SURGERY
Payer: COMMERCIAL

## 2023-12-07 ENCOUNTER — ANESTHESIA EVENT (OUTPATIENT)
Dept: SURGERY | Facility: CLINIC | Age: 68
DRG: 464 | End: 2023-12-07
Payer: COMMERCIAL

## 2023-12-07 LAB
ABO/RH(D): NORMAL
ANION GAP SERPL CALCULATED.3IONS-SCNC: 12 MMOL/L (ref 7–15)
ANTIBODY SCREEN: NEGATIVE
APTT PPP: 80 SECONDS (ref 22–38)
BUN SERPL-MCNC: 32.3 MG/DL (ref 8–23)
CALCIUM SERPL-MCNC: 9.1 MG/DL (ref 8.8–10.2)
CHLORIDE SERPL-SCNC: 104 MMOL/L (ref 98–107)
CREAT SERPL-MCNC: 2.03 MG/DL (ref 0.67–1.17)
DEPRECATED HCO3 PLAS-SCNC: 18 MMOL/L (ref 22–29)
EGFRCR SERPLBLD CKD-EPI 2021: 35 ML/MIN/1.73M2
ERYTHROCYTE [DISTWIDTH] IN BLOOD BY AUTOMATED COUNT: 16.1 % (ref 10–15)
GLUCOSE SERPL-MCNC: 96 MG/DL (ref 70–99)
HCT VFR BLD AUTO: 22.1 % (ref 40–53)
HGB BLD-MCNC: 7.1 G/DL (ref 13.3–17.7)
MAGNESIUM SERPL-MCNC: 1.8 MG/DL (ref 1.7–2.3)
MCH RBC QN AUTO: 30.7 PG (ref 26.5–33)
MCHC RBC AUTO-ENTMCNC: 32.1 G/DL (ref 31.5–36.5)
MCV RBC AUTO: 96 FL (ref 78–100)
PHOSPHATE SERPL-MCNC: 4.1 MG/DL (ref 2.5–4.5)
PLATELET # BLD AUTO: 95 10E3/UL (ref 150–450)
POTASSIUM SERPL-SCNC: 4 MMOL/L (ref 3.4–5.3)
RBC # BLD AUTO: 2.31 10E6/UL (ref 4.4–5.9)
SODIUM SERPL-SCNC: 134 MMOL/L (ref 135–145)
SPECIMEN EXPIRATION DATE: NORMAL
UFH PPP CHRO-ACNC: 0.13 IU/ML
WBC # BLD AUTO: 5 10E3/UL (ref 4–11)

## 2023-12-07 PROCEDURE — 250N000011 HC RX IP 250 OP 636: Mod: JZ | Performed by: NURSE PRACTITIONER

## 2023-12-07 PROCEDURE — 250N000013 HC RX MED GY IP 250 OP 250 PS 637: Performed by: NURSE PRACTITIONER

## 2023-12-07 PROCEDURE — 85027 COMPLETE CBC AUTOMATED: CPT | Performed by: PHYSICIAN ASSISTANT

## 2023-12-07 PROCEDURE — 36592 COLLECT BLOOD FROM PICC: CPT | Performed by: PHYSICIAN ASSISTANT

## 2023-12-07 PROCEDURE — 36592 COLLECT BLOOD FROM PICC: CPT | Performed by: STUDENT IN AN ORGANIZED HEALTH CARE EDUCATION/TRAINING PROGRAM

## 2023-12-07 PROCEDURE — 84100 ASSAY OF PHOSPHORUS: CPT | Performed by: PHYSICIAN ASSISTANT

## 2023-12-07 PROCEDURE — 83735 ASSAY OF MAGNESIUM: CPT | Performed by: PHYSICIAN ASSISTANT

## 2023-12-07 PROCEDURE — 250N000013 HC RX MED GY IP 250 OP 250 PS 637: Performed by: PHYSICIAN ASSISTANT

## 2023-12-07 PROCEDURE — 97530 THERAPEUTIC ACTIVITIES: CPT | Mod: GP

## 2023-12-07 PROCEDURE — 36592 COLLECT BLOOD FROM PICC: CPT | Performed by: NURSE PRACTITIONER

## 2023-12-07 PROCEDURE — 86900 BLOOD TYPING SEROLOGIC ABO: CPT | Performed by: NURSE PRACTITIONER

## 2023-12-07 PROCEDURE — 120N000011 HC R&B TRANSPLANT UMMC

## 2023-12-07 PROCEDURE — 80048 BASIC METABOLIC PNL TOTAL CA: CPT | Performed by: PHYSICIAN ASSISTANT

## 2023-12-07 PROCEDURE — 99024 POSTOP FOLLOW-UP VISIT: CPT | Performed by: NURSE PRACTITIONER

## 2023-12-07 PROCEDURE — 250N000011 HC RX IP 250 OP 636: Mod: JZ | Performed by: SURGERY

## 2023-12-07 PROCEDURE — 250N000011 HC RX IP 250 OP 636: Performed by: PHYSICIAN ASSISTANT

## 2023-12-07 PROCEDURE — 85730 THROMBOPLASTIN TIME PARTIAL: CPT | Performed by: STUDENT IN AN ORGANIZED HEALTH CARE EDUCATION/TRAINING PROGRAM

## 2023-12-07 PROCEDURE — 86923 COMPATIBILITY TEST ELECTRIC: CPT

## 2023-12-07 PROCEDURE — 85520 HEPARIN ASSAY: CPT | Performed by: STUDENT IN AN ORGANIZED HEALTH CARE EDUCATION/TRAINING PROGRAM

## 2023-12-07 RX ADMIN — METOPROLOL TARTRATE 100 MG: 50 TABLET, FILM COATED ORAL at 08:52

## 2023-12-07 RX ADMIN — ONDANSETRON 4 MG: 4 TABLET, ORALLY DISINTEGRATING ORAL at 16:58

## 2023-12-07 RX ADMIN — MELATONIN 3 MG: at 18:31

## 2023-12-07 RX ADMIN — AMLODIPINE BESYLATE 10 MG: 10 TABLET ORAL at 08:53

## 2023-12-07 RX ADMIN — PANTOPRAZOLE SODIUM 40 MG: 40 TABLET, DELAYED RELEASE ORAL at 18:31

## 2023-12-07 RX ADMIN — PSYLLIUM HUSK 1 PACKET: 3.4 POWDER ORAL at 08:48

## 2023-12-07 RX ADMIN — Medication 5 ML: at 06:00

## 2023-12-07 RX ADMIN — Medication 10 ML: at 15:57

## 2023-12-07 RX ADMIN — METOPROLOL TARTRATE 100 MG: 50 TABLET, FILM COATED ORAL at 20:31

## 2023-12-07 RX ADMIN — HEPARIN SODIUM 500 UNITS/HR: 10000 INJECTION, SOLUTION INTRAVENOUS at 16:26

## 2023-12-07 RX ADMIN — ACETAMINOPHEN 975 MG: 325 TABLET, FILM COATED ORAL at 13:35

## 2023-12-07 RX ADMIN — Medication 60 ML: at 08:48

## 2023-12-07 RX ADMIN — ACETAMINOPHEN 975 MG: 325 TABLET, FILM COATED ORAL at 20:32

## 2023-12-07 RX ADMIN — Medication: at 21:52

## 2023-12-07 RX ADMIN — SENNOSIDES 1 TABLET: 8.6 TABLET, FILM COATED ORAL at 08:48

## 2023-12-07 RX ADMIN — ATORVASTATIN CALCIUM 10 MG: 10 TABLET, FILM COATED ORAL at 20:32

## 2023-12-07 RX ADMIN — QUETIAPINE FUMARATE 75 MG: 25 TABLET ORAL at 20:32

## 2023-12-07 RX ADMIN — Medication 50 MCG: at 11:21

## 2023-12-07 RX ADMIN — SENNOSIDES 1 TABLET: 8.6 TABLET, FILM COATED ORAL at 20:32

## 2023-12-07 ASSESSMENT — ACTIVITIES OF DAILY LIVING (ADL)
ADLS_ACUITY_SCORE: 45
ADLS_ACUITY_SCORE: 43
ADLS_ACUITY_SCORE: 43
ADLS_ACUITY_SCORE: 45
ADLS_ACUITY_SCORE: 45
ADLS_ACUITY_SCORE: 43
ADLS_ACUITY_SCORE: 43
ADLS_ACUITY_SCORE: 45
ADLS_ACUITY_SCORE: 45
ADLS_ACUITY_SCORE: 43
ADLS_ACUITY_SCORE: 45
ADLS_ACUITY_SCORE: 43

## 2023-12-07 ASSESSMENT — LIFESTYLE VARIABLES: TOBACCO_USE: 1

## 2023-12-07 NOTE — ANESTHESIA PREPROCEDURE EVALUATION
Anesthesia Pre-Procedure Evaluation    Patient: Alfredo Burnham   MRN: [id number] : 1955        Procedure : Procedure(s):  REVISION, AMPUTATION STUMP, LEFT LOWER EXTREMITY          Past Medical History:   Diagnosis Date    Hypertension 2011    Macular degeneration       Past Surgical History:   Procedure Laterality Date    AMPUTATE LEG ABOVE KNEE Left 10/17/2023    Procedure: AMPUTATION, ABOVE KNEE and Abdominal wound vac exchange;  Surgeon: Ash Boucher MBBS;  Location: [facility]    CLOSE SECONDARY WOUND ABDOMEN N/A 10/20/2023    Procedure: Delayed primary subcutaneous abdominal closure;  Surgeon: Boris Lowe;  Location: [facility]    INCISION AND DRAINAGE ABDOMEN WASHOUT, COMBINED N/A 2023    Procedure: abdominal washout, combined, repacking,  abthera placement;  Surgeon: Ash Boucher MBBS;  Location: [facility]    INCISION AND DRAINAGE ABDOMEN WASHOUT, COMBINED N/A 2023    Procedure: Abdominal washout, Retroperitoneal closure, washout left lower extremity wound;  Surgeon: Boris Lowe;  Location: [facility]    INCISION AND DRAINAGE LOWER EXTREMITY, COMBINED Left 2023    Procedure: irrigation and drainage of collection left above knee amputation stump;  Surgeon: Ash Boucher MBBS;  Location: [facility]    IR CVC NON TUNNEL LINE REMOVAL  10/18/2023    IR CVC TUNNEL PLACEMENT > 5 YRS OF AGE  10/18/2023    IR CVC TUNNEL REMOVAL RIGHT  2023    IR OR ANGIOGRAM  2023    IRRIGATION AND DEBRIDEMENT ABDOMEN WASHOUT, COMBINED N/A 2023    Procedure: exploration of  ABDOMINAL CAVITY, placement of abthera;  Surgeon: Boris Lowe;  [facility]: [facility]    IRRIGATION AND DEBRIDEMENT ABDOMEN WASHOUT, COMBINED N/A 10/02/2023    Procedure: Exploratory laparotomy, abominal closure, wound vac change left lower extremity;  Surgeon: Jonathan Fry MD;  Location: [facility]    IRRIGATION AND DEBRIDEMENT LOWER EXTREMITY, COMBINED Left 2023    Procedure: exploration of left  lower extremity, partial closure of left lower extremity, wound vac placement;  Surgeon: Boris Lowe;  Location: [facility]    LAPAROTOMY EXPLORATORY N/A 09/25/2023    Procedure: Laparotomy exploratory;  Surgeon: Roger Shirley MD;  Location: [facility]    PICC INSERTION - DOUBLE LUMEN Right 12/04/2023    43-1cm, Basilic vein    REPAIR ANEURYSM ABDOMINAL AORTA N/A 09/24/2023    Procedure: Resection of Ruptureed Abdominal Aneurysm, Aortic biliary bypass with 20 x 10 mm Bifurcated hemagard graft, Temporary Abdominal Closure, Endovascular Balloon Inclusion of Aorta;  Surgeon: Boris Lowe;  Location: [facility]    SIGMOIDOSCOPY FLEXIBLE N/A 09/25/2023    Procedure: Sigmoidoscopy flexible;  Surgeon: Gabino Walker MD;  Location: [facility]    THROMBECTOMY LOWER EXTREMITY Left 09/25/2023    Procedure: SFA, popliteal, and tibial thromboembolectomy and four compartment fasciotomy;  Surgeon: Ash Boucher MBBS;  Location: [facility]      Allergies   Allergen Reactions    Penicillins Swelling     Facial swelling    Has tolerated cefazolin.     Cefepime Rash     Diffuse whole body erythematous pruritic rash      Social History     Tobacco Use    Smoking status: Every Day     Packs/day: .5     Types: Cigarettes    Smokeless tobacco: Not on file   Substance Use Topics    Alcohol use: Yes     Comment: 1-2/wk      Wt Readings from Last 1 Encounters:   12/01/23 59.3 kg (130 lb 11.7 oz)        Anesthesia Evaluation   Pt has had prior anesthetic. Type: General.        ROS/MED HX  ENT/Pulmonary: Comment: Left sided vocal cord palsy due to prolonged intubation.    (+)     RIGO risk factors, snores loudly, hypertension,     vocal cord abnormalities -  Hoarseness,   tobacco use, Past use,  50  Pack-Year Hx,                     Neurologic:     (+)          CVA, date: 2023, without deficits,                    Cardiovascular: Comment: Infrarenal AAA ruptured 9/2023 requiring multiple surgeries      (+)  hypertension- -   -  - -    "Taking blood thinners Pt has received instructions: Instructions Given to patient: Emiliano last dose 11/29/23.                                 METS/Exercise Tolerance:     Hematologic:     (+) History of blood clots,    pt is anticoagulated, anemia, history of blood transfusion, no previous transfusion reaction,        Musculoskeletal: Comment: Left AKA      GI/Hepatic:     (+) GERD, Asymptomatic on medication,                  Renal/Genitourinary:     (+) renal disease, type: CRI, Pt does not require dialysis,           Endo:  - neg endo ROS     Psychiatric/Substance Use:     (+)   alcohol abuse      Infectious Disease:  - neg infectious disease ROS     Malignancy:  - neg malignancy ROS     Other:            Physical Exam    Airway        Mallampati: II   TM distance: > 3 FB   Neck ROM: limited   Mouth opening: > 3 cm    Respiratory Devices and Support         Dental       (+) Multiple visibly decayed, broken teeth      Cardiovascular   cardiovascular exam normal          Pulmonary   pulmonary exam normal                OUTSIDE LABS:  CBC:   Lab Results   Component Value Date    WBC 5.0 12/07/2023    WBC 8.3 12/04/2023    HGB 7.1 (L) 12/07/2023    HGB 8.0 (L) 12/04/2023    HCT 22.1 (L) 12/07/2023    HCT 25.0 (L) 12/04/2023    PLT 95 (L) 12/07/2023     (L) 12/04/2023     BMP:   Lab Results   Component Value Date     (L) 12/07/2023     (L) 12/04/2023    POTASSIUM 4.0 12/07/2023    POTASSIUM 3.9 12/04/2023    CHLORIDE 104 12/07/2023    CHLORIDE 105 12/04/2023    CO2 18 (L) 12/07/2023    CO2 16 (L) 12/04/2023    BUN 32.3 (H) 12/07/2023    BUN 48.5 (H) 12/04/2023    CR 2.03 (H) 12/07/2023    CR 2.09 (H) 12/04/2023    GLC 96 12/07/2023    GLC 99 12/04/2023     COAGS:   Lab Results   Component Value Date    PTT 80 (H) 12/07/2023    INR 1.27 (H) 12/06/2023    FIBR 556 (H) 10/25/2023     POC: No results found for: \"BGM\", \"HCG\", \"HCGS\"  HEPATIC:   Lab Results   Component Value Date    ALBUMIN 3.1 (L) " 10/30/2023    PROTTOTAL 6.1 (L) 10/30/2023    ALT 15 10/30/2023    AST 24 10/30/2023    ALKPHOS 132 (H) 10/30/2023    BILITOTAL 0.5 10/30/2023    BHUPENDRA 18 10/25/2023     OTHER:   Lab Results   Component Value Date    PH 7.41 10/14/2023    PH 7.41 10/14/2023    LACT 1.5 10/28/2023    A1C 5.7 (H) 09/25/2023    OMAR 9.1 12/07/2023    PHOS 4.1 12/07/2023    MAG 1.8 12/07/2023    LIPASE 51 10/14/2023    TSH 16.70 (H) 10/25/2023       Anesthesia Plan    ASA Status:  3    NPO Status:  NPO Appropriate    Anesthesia Type: General.     - Airway: ETT   Induction: Intravenous.   Maintenance: Inhalation.   Techniques and Equipment:     - Lines/Monitors: 2nd IV     Consents    Anesthesia Plan(s) and associated risks, benefits, and realistic alternatives discussed. Questions answered and patient/representative(s) expressed understanding.     - Discussed: Risks, Benefits and Alternatives for BOTH SEDATION and the PROCEDURE were discussed     - Discussed with:  Patient      - Extended Intubation/Ventilatory Support Discussed: No.      - Patient is DNR/DNI Status: No     Use of blood products discussed: No .     Postoperative Care    Pain management: IV analgesics, Oral pain medications.     - Plan for long acting post-op opioid use   PONV prophylaxis: Ondansetron (or other 5HT-3)     Comments:           H&P reviewed: Unable to attach H&P to encounter due to EHR limitations. H&P Update: appropriate H&P reviewed, patient examined. No interval changes since H&P (within 30 days).        Bhupinder Servin MD    I have reviewed the pertinent notes and labs in the chart from the past 30 days and (re)examined the patient.  Any updates or changes from those notes are reflected in this note.

## 2023-12-07 NOTE — PROGRESS NOTES
Vascular Surgery Progress Note  12/07/2023       Subjective:  No acute events overnight     Objective:  Temp:  [97.9  F (36.6  C)-98.2  F (36.8  C)] 98.2  F (36.8  C)  Pulse:  [78-90] 85  Resp:  [16] 16  BP: ()/(64-76) 125/76  SpO2:  [97 %-100 %] 97 %    I/O last 3 completed shifts:  In: 120 [P.O.:120]  Out: 705 [Urine:705]      Gen: Awake, alert, NAD  CV: RRR per radial pulse  Resp: NLB on RA  Abd: , soft, nondistended, nontender  Incision: abdominal incision  clean, dry, intact.   Ext: WWP, no edema.   LLE amputation site dressing changed, kerlix packed into wound, wrapped with kerlix and ACE. Bone is palpable in wound. moderate drainage.             Labs:  Recent Labs   Lab 12/07/23  0606 12/04/23  1049 12/04/23  0513   WBC 5.0 8.3 6.8   HGB 7.1* 8.0* 7.7*   PLT 95* 113* 127*       Recent Labs   Lab 12/07/23  0606 12/04/23  0513 12/02/23  1441   * 133* 133*   POTASSIUM 4.0 3.9 4.5   CHLORIDE 104 105 104   CO2 18* 16* 17*   BUN 32.3* 48.5* 49.5*   CR 2.03* 2.09* 2.10*   GLC 96 99 116*   OMAR 9.1 9.3 9.1   MAG 1.8 1.9  --    PHOS 4.1 4.5  --         Assessment/Plan:   68 year old male well known to vascular surgery w/ h/o rutpured pararenal aneurysm s/p open repair in September of this year who was found to have drainage from his stump site concerning for infection now s/p washout on 12/1. Now growing VRE on wound cultures from 11/29. Given recent cultures, plan for return to the OR on Friday on 12/8/2023 for first stage of stump revision. Plan for OR tomorrow morning.    - Pain controlled  - OK for reg diet, NPO after midnight for OR tomorrow  - Type and screen  - Patient was due to see ENT next week, will reach out to see while inpatient   - Resumed prior meds (except eliquis)  - ID following given VRE, recommended starting daptomycin - anticipating 6 week course. Picc placed 12/4/23. Vascular surgery will send for culture for Gram stain/pathology.  - Holding Eliquis for now, on low-dose heparin drip  without boluses.  - Vascular will do daily dressing changes at this time: Dakin's solution to be used for soaking daily dressing.     Discussed pt history, exam, assessment and plan with Dr. Lowe of the vascular surgery service, who is in agreement with the above.    Miryam Carrasco [facility]  Vascular Surgery  Pager: [phone]  [email or web address]  Send message or 10 digit call back number Securely via [employer] with the [employer] Web Console (learn more here)

## 2023-12-07 NOTE — PLAN OF CARE
VS: /76 (BP Location: Left arm)   Pulse 85   Temp 98.2  F (36.8  C) (Oral)   Resp 16   SpO2 97%     Cares: 2300 - 0730     Neuro: Aox4   VS: VSS   Cardiac: WDL  Respiratory: WDL on RA, denies SOB.    GI/: voiding adequately w/out issues   Skin: LLE wound - dressing C/D/I, midline abdominal incison - prima pore C/D/I   Diet: Regular   Labs: Xa: 0.13, PTT: 80 seconds, hgb: 7.1  BG: none   LDA: Left PIV, Right PICC  Mobility: Ax2 w/ lift   Pain/Nausea: denies pain or nausea   PRN medications: none   Plan of Care: plan for stump revision on 12-8 Friday. Continue with current POC and update MD with any changes.

## 2023-12-07 NOTE — PLAN OF CARE
BP 96/64 (BP Location: Left arm)   Pulse 80   Temp 97.9  F (36.6  C) (Oral)   Resp 16   SpO2 97%      Shift: 0095-4706  Isolation Status: Contact  VS: stable on room air, afebrile  Neuro: Aox4  Behaviors: calm and cooperative, a little flat  BG: n/a  Respiratory: WDL, no sob reported  Cardiac: WDL, regular rate and rhythm  Pain/Nausea: Denies pain, denies nausea  PRN: none given  Diet: regular, fair appetite  IV Access: L.PIV, R. Double lumen picc  Infusion(s): Heparin @ 500 units/hr,  next recheck at 2307  Lines/Drains: none  GI/: No BM this shift, voiding  Skin: Left AKA, dressing CDI  Mobility: Assist 2 with lift  Plan: Continue plan of care and notify team of any changes

## 2023-12-07 NOTE — PLAN OF CARE
Goal Outcome Evaluation:  /82 (BP Location: Right arm)   Pulse 80   Temp 98.2  F (36.8  C) (Oral)   Resp 16   SpO2 98%       Shift: 8187-9689  VS: VSS on RA   Pain: Denies pain. Refused Tylenol this morning.   Neuro: Alert and oriented x 4.   Cardiac: WNL  Respiratory: Denies SOB   Diet/Appetite:  Regular diet, fair appetite  /GI: Bowel sounds present, passing gas, No BM this shift  LDA's: PICC double lumen heparin lock. L PIV infusing heparin gtt at 500 units/hr, at goal. Re-check tomorrow morning.   Skin: dry skin and blanchable redness on coccyx area, mepilex dressing on. L AKA dressing changes done by vascular. Abdominal dressing changed this shift.   Activity: A2- lift. Patient ambulated via wheelchair with wife downstairs  Plan: tomorrow revision of stump, NPO after midnight  Pertinent Labs/: PTT 80             Plan of Care Reviewed With: patient

## 2023-12-08 ENCOUNTER — APPOINTMENT (OUTPATIENT)
Dept: PHYSICAL THERAPY | Facility: CLINIC | Age: 68
DRG: 464 | End: 2023-12-08
Attending: SURGERY
Payer: COMMERCIAL

## 2023-12-08 ENCOUNTER — APPOINTMENT (OUTPATIENT)
Dept: SPEECH THERAPY | Facility: CLINIC | Age: 68
DRG: 464 | End: 2023-12-08
Attending: NURSE PRACTITIONER
Payer: COMMERCIAL

## 2023-12-08 ENCOUNTER — ANESTHESIA (OUTPATIENT)
Dept: SURGERY | Facility: CLINIC | Age: 68
DRG: 464 | End: 2023-12-08
Payer: COMMERCIAL

## 2023-12-08 LAB
ALBUMIN SERPL BCG-MCNC: 3.5 G/DL (ref 3.5–5.2)
ALP SERPL-CCNC: 71 U/L (ref 40–150)
ALT SERPL W P-5'-P-CCNC: 16 U/L (ref 0–70)
ANION GAP SERPL CALCULATED.3IONS-SCNC: 11 MMOL/L (ref 7–15)
APTT PPP: 53 SECONDS (ref 22–38)
AST SERPL W P-5'-P-CCNC: 18 U/L (ref 0–45)
BACTERIA TISS BX CULT: NORMAL
BILIRUB SERPL-MCNC: 0.3 MG/DL
BLD PROD TYP BPU: NORMAL
BLD PROD TYP BPU: NORMAL
BLOOD COMPONENT TYPE: NORMAL
BLOOD COMPONENT TYPE: NORMAL
BUN SERPL-MCNC: 32.2 MG/DL (ref 8–23)
CALCIUM SERPL-MCNC: 9.2 MG/DL (ref 8.8–10.2)
CHLORIDE SERPL-SCNC: 105 MMOL/L (ref 98–107)
CODING SYSTEM: NORMAL
CODING SYSTEM: NORMAL
CREAT SERPL-MCNC: 1.93 MG/DL (ref 0.67–1.17)
CROSSMATCH: NORMAL
CROSSMATCH: NORMAL
DEPRECATED HCO3 PLAS-SCNC: 18 MMOL/L (ref 22–29)
EGFRCR SERPLBLD CKD-EPI 2021: 37 ML/MIN/1.73M2
ERYTHROCYTE [DISTWIDTH] IN BLOOD BY AUTOMATED COUNT: 16.1 % (ref 10–15)
GLUCOSE SERPL-MCNC: 105 MG/DL (ref 70–99)
GRAM STAIN RESULT: NORMAL
HCT VFR BLD AUTO: 23 % (ref 40–53)
HGB BLD-MCNC: 7.4 G/DL (ref 13.3–17.7)
ISSUE DATE AND TIME: NORMAL
ISSUE DATE AND TIME: NORMAL
MAGNESIUM SERPL-MCNC: 1.8 MG/DL (ref 1.7–2.3)
MCH RBC QN AUTO: 30.8 PG (ref 26.5–33)
MCHC RBC AUTO-ENTMCNC: 32.2 G/DL (ref 31.5–36.5)
MCV RBC AUTO: 96 FL (ref 78–100)
PLATELET # BLD AUTO: 93 10E3/UL (ref 150–450)
POTASSIUM SERPL-SCNC: 4.1 MMOL/L (ref 3.4–5.3)
PROT SERPL-MCNC: 6.3 G/DL (ref 6.4–8.3)
RBC # BLD AUTO: 2.4 10E6/UL (ref 4.4–5.9)
SODIUM SERPL-SCNC: 134 MMOL/L (ref 135–145)
UNIT ABO/RH: NORMAL
UNIT ABO/RH: NORMAL
UNIT NUMBER: NORMAL
UNIT NUMBER: NORMAL
UNIT STATUS: NORMAL
UNIT STATUS: NORMAL
UNIT TYPE ISBT: 5100
UNIT TYPE ISBT: 5100
WBC # BLD AUTO: 4.6 10E3/UL (ref 4–11)

## 2023-12-08 PROCEDURE — 250N000025 HC SEVOFLURANE, PER MIN: Performed by: SURGERY

## 2023-12-08 PROCEDURE — 85014 HEMATOCRIT: CPT | Performed by: STUDENT IN AN ORGANIZED HEALTH CARE EDUCATION/TRAINING PROGRAM

## 2023-12-08 PROCEDURE — 710N000010 HC RECOVERY PHASE 1, LEVEL 2, PER MIN: Performed by: SURGERY

## 2023-12-08 PROCEDURE — 36592 COLLECT BLOOD FROM PICC: CPT | Performed by: STUDENT IN AN ORGANIZED HEALTH CARE EDUCATION/TRAINING PROGRAM

## 2023-12-08 PROCEDURE — 250N000013 HC RX MED GY IP 250 OP 250 PS 637: Performed by: PHYSICIAN ASSISTANT

## 2023-12-08 PROCEDURE — 85730 THROMBOPLASTIN TIME PARTIAL: CPT | Performed by: SURGERY

## 2023-12-08 PROCEDURE — 92610 EVALUATE SWALLOWING FUNCTION: CPT | Mod: GN

## 2023-12-08 PROCEDURE — P9016 RBC LEUKOCYTES REDUCED: HCPCS

## 2023-12-08 PROCEDURE — 92526 ORAL FUNCTION THERAPY: CPT | Mod: GN

## 2023-12-08 PROCEDURE — 87205 SMEAR GRAM STAIN: CPT | Performed by: SURGERY

## 2023-12-08 PROCEDURE — 250N000011 HC RX IP 250 OP 636: Mod: JZ | Performed by: NURSE PRACTITIONER

## 2023-12-08 PROCEDURE — 120N000011 HC R&B TRANSPLANT UMMC

## 2023-12-08 PROCEDURE — 36592 COLLECT BLOOD FROM PICC: CPT | Performed by: SURGERY

## 2023-12-08 PROCEDURE — 250N000013 HC RX MED GY IP 250 OP 250 PS 637: Performed by: NURSE PRACTITIONER

## 2023-12-08 PROCEDURE — 87102 FUNGUS ISOLATION CULTURE: CPT | Performed by: SURGERY

## 2023-12-08 PROCEDURE — 258N000003 HC RX IP 258 OP 636: Performed by: NURSE ANESTHETIST, CERTIFIED REGISTERED

## 2023-12-08 PROCEDURE — 258N000003 HC RX IP 258 OP 636: Performed by: STUDENT IN AN ORGANIZED HEALTH CARE EDUCATION/TRAINING PROGRAM

## 2023-12-08 PROCEDURE — 250N000011 HC RX IP 250 OP 636: Mod: JZ | Performed by: NURSE ANESTHETIST, CERTIFIED REGISTERED

## 2023-12-08 PROCEDURE — 370N000017 HC ANESTHESIA TECHNICAL FEE, PER MIN: Performed by: SURGERY

## 2023-12-08 PROCEDURE — 27596 AMPUTATION FOLLOW-UP SURGERY: CPT | Mod: 52 | Performed by: SURGERY

## 2023-12-08 PROCEDURE — 87070 CULTURE OTHR SPECIMN AEROBIC: CPT | Performed by: SURGERY

## 2023-12-08 PROCEDURE — 250N000009 HC RX 250: Performed by: SURGERY

## 2023-12-08 PROCEDURE — 360N000076 HC SURGERY LEVEL 3, PER MIN: Performed by: SURGERY

## 2023-12-08 PROCEDURE — 83735 ASSAY OF MAGNESIUM: CPT | Performed by: STUDENT IN AN ORGANIZED HEALTH CARE EDUCATION/TRAINING PROGRAM

## 2023-12-08 PROCEDURE — 0KBR0ZZ EXCISION OF LEFT UPPER LEG MUSCLE, OPEN APPROACH: ICD-10-PCS | Performed by: SURGERY

## 2023-12-08 PROCEDURE — 250N000011 HC RX IP 250 OP 636: Mod: JZ | Performed by: STUDENT IN AN ORGANIZED HEALTH CARE EDUCATION/TRAINING PROGRAM

## 2023-12-08 PROCEDURE — 250N000009 HC RX 250: Performed by: NURSE ANESTHETIST, CERTIFIED REGISTERED

## 2023-12-08 PROCEDURE — 999N000141 HC STATISTIC PRE-PROCEDURE NURSING ASSESSMENT: Performed by: SURGERY

## 2023-12-08 PROCEDURE — 87075 CULTR BACTERIA EXCEPT BLOOD: CPT | Performed by: SURGERY

## 2023-12-08 PROCEDURE — 82040 ASSAY OF SERUM ALBUMIN: CPT | Performed by: STUDENT IN AN ORGANIZED HEALTH CARE EDUCATION/TRAINING PROGRAM

## 2023-12-08 PROCEDURE — 97530 THERAPEUTIC ACTIVITIES: CPT | Mod: GP

## 2023-12-08 PROCEDURE — 99024 POSTOP FOLLOW-UP VISIT: CPT | Performed by: NURSE PRACTITIONER

## 2023-12-08 PROCEDURE — 272N000001 HC OR GENERAL SUPPLY STERILE: Performed by: SURGERY

## 2023-12-08 RX ORDER — LIDOCAINE 40 MG/G
CREAM TOPICAL
Status: DISCONTINUED | OUTPATIENT
Start: 2023-12-08 | End: 2023-12-08 | Stop reason: HOSPADM

## 2023-12-08 RX ORDER — MAGNESIUM SULFATE HEPTAHYDRATE 40 MG/ML
2 INJECTION, SOLUTION INTRAVENOUS ONCE
Status: DISCONTINUED | OUTPATIENT
Start: 2023-12-08 | End: 2023-12-08 | Stop reason: HOSPADM

## 2023-12-08 RX ORDER — NALOXONE HYDROCHLORIDE 0.4 MG/ML
0.4 INJECTION, SOLUTION INTRAMUSCULAR; INTRAVENOUS; SUBCUTANEOUS
Status: DISCONTINUED | OUTPATIENT
Start: 2023-12-08 | End: 2023-12-08 | Stop reason: HOSPADM

## 2023-12-08 RX ORDER — HYDROMORPHONE HCL IN WATER/PF 6 MG/30 ML
0.2 PATIENT CONTROLLED ANALGESIA SYRINGE INTRAVENOUS EVERY 5 MIN PRN
Status: DISCONTINUED | OUTPATIENT
Start: 2023-12-08 | End: 2023-12-08 | Stop reason: HOSPADM

## 2023-12-08 RX ORDER — FENTANYL CITRATE 50 UG/ML
50 INJECTION, SOLUTION INTRAMUSCULAR; INTRAVENOUS EVERY 5 MIN PRN
Status: DISCONTINUED | OUTPATIENT
Start: 2023-12-08 | End: 2023-12-08 | Stop reason: HOSPADM

## 2023-12-08 RX ORDER — ALBUTEROL SULFATE 0.83 MG/ML
2.5 SOLUTION RESPIRATORY (INHALATION) EVERY 4 HOURS PRN
Status: DISCONTINUED | OUTPATIENT
Start: 2023-12-08 | End: 2023-12-08 | Stop reason: HOSPADM

## 2023-12-08 RX ORDER — FENTANYL CITRATE 50 UG/ML
25 INJECTION, SOLUTION INTRAMUSCULAR; INTRAVENOUS EVERY 5 MIN PRN
Status: DISCONTINUED | OUTPATIENT
Start: 2023-12-08 | End: 2023-12-08 | Stop reason: HOSPADM

## 2023-12-08 RX ORDER — MAGNESIUM HYDROXIDE 1200 MG/15ML
LIQUID ORAL PRN
Status: DISCONTINUED | OUTPATIENT
Start: 2023-12-08 | End: 2023-12-08 | Stop reason: HOSPADM

## 2023-12-08 RX ORDER — SODIUM CHLORIDE, SODIUM GLUCONATE, SODIUM ACETATE, POTASSIUM CHLORIDE AND MAGNESIUM CHLORIDE 526; 502; 368; 37; 30 MG/100ML; MG/100ML; MG/100ML; MG/100ML; MG/100ML
INJECTION, SOLUTION INTRAVENOUS CONTINUOUS PRN
Status: DISCONTINUED | OUTPATIENT
Start: 2023-12-08 | End: 2023-12-08

## 2023-12-08 RX ORDER — FENTANYL CITRATE 50 UG/ML
25-50 INJECTION, SOLUTION INTRAMUSCULAR; INTRAVENOUS
Status: DISCONTINUED | OUTPATIENT
Start: 2023-12-08 | End: 2023-12-08 | Stop reason: HOSPADM

## 2023-12-08 RX ORDER — SODIUM CHLORIDE, SODIUM LACTATE, POTASSIUM CHLORIDE, CALCIUM CHLORIDE 600; 310; 30; 20 MG/100ML; MG/100ML; MG/100ML; MG/100ML
INJECTION, SOLUTION INTRAVENOUS CONTINUOUS
Status: DISCONTINUED | OUTPATIENT
Start: 2023-12-08 | End: 2023-12-08 | Stop reason: HOSPADM

## 2023-12-08 RX ORDER — NALOXONE HYDROCHLORIDE 0.4 MG/ML
0.2 INJECTION, SOLUTION INTRAMUSCULAR; INTRAVENOUS; SUBCUTANEOUS
Status: DISCONTINUED | OUTPATIENT
Start: 2023-12-08 | End: 2023-12-08 | Stop reason: HOSPADM

## 2023-12-08 RX ORDER — ONDANSETRON 4 MG/1
4 TABLET, ORALLY DISINTEGRATING ORAL EVERY 30 MIN PRN
Status: DISCONTINUED | OUTPATIENT
Start: 2023-12-08 | End: 2023-12-08 | Stop reason: HOSPADM

## 2023-12-08 RX ORDER — FENTANYL CITRATE 50 UG/ML
INJECTION, SOLUTION INTRAMUSCULAR; INTRAVENOUS PRN
Status: DISCONTINUED | OUTPATIENT
Start: 2023-12-08 | End: 2023-12-08

## 2023-12-08 RX ORDER — LIDOCAINE HYDROCHLORIDE 20 MG/ML
INJECTION, SOLUTION INFILTRATION; PERINEURAL PRN
Status: DISCONTINUED | OUTPATIENT
Start: 2023-12-08 | End: 2023-12-08

## 2023-12-08 RX ORDER — HALOPERIDOL 5 MG/ML
1 INJECTION INTRAMUSCULAR
Status: DISCONTINUED | OUTPATIENT
Start: 2023-12-08 | End: 2023-12-08 | Stop reason: HOSPADM

## 2023-12-08 RX ORDER — HYDRALAZINE HYDROCHLORIDE 20 MG/ML
2.5-5 INJECTION INTRAMUSCULAR; INTRAVENOUS EVERY 10 MIN PRN
Status: DISCONTINUED | OUTPATIENT
Start: 2023-12-08 | End: 2023-12-08 | Stop reason: HOSPADM

## 2023-12-08 RX ORDER — FLUMAZENIL 0.1 MG/ML
0.2 INJECTION, SOLUTION INTRAVENOUS
Status: DISCONTINUED | OUTPATIENT
Start: 2023-12-08 | End: 2023-12-08 | Stop reason: HOSPADM

## 2023-12-08 RX ORDER — HYDROMORPHONE HCL IN WATER/PF 6 MG/30 ML
0.4 PATIENT CONTROLLED ANALGESIA SYRINGE INTRAVENOUS EVERY 5 MIN PRN
Status: DISCONTINUED | OUTPATIENT
Start: 2023-12-08 | End: 2023-12-08 | Stop reason: HOSPADM

## 2023-12-08 RX ORDER — ONDANSETRON 2 MG/ML
INJECTION INTRAMUSCULAR; INTRAVENOUS PRN
Status: DISCONTINUED | OUTPATIENT
Start: 2023-12-08 | End: 2023-12-08

## 2023-12-08 RX ORDER — ACETAMINOPHEN 325 MG/1
975 TABLET ORAL ONCE
Status: DISCONTINUED | OUTPATIENT
Start: 2023-12-08 | End: 2023-12-08 | Stop reason: HOSPADM

## 2023-12-08 RX ORDER — HYDROXYZINE HYDROCHLORIDE 10 MG/1
10 TABLET, FILM COATED ORAL EVERY 6 HOURS PRN
Status: DISCONTINUED | OUTPATIENT
Start: 2023-12-08 | End: 2023-12-08 | Stop reason: HOSPADM

## 2023-12-08 RX ORDER — ONDANSETRON 2 MG/ML
4 INJECTION INTRAMUSCULAR; INTRAVENOUS EVERY 30 MIN PRN
Status: DISCONTINUED | OUTPATIENT
Start: 2023-12-08 | End: 2023-12-08 | Stop reason: HOSPADM

## 2023-12-08 RX ORDER — KETOROLAC TROMETHAMINE 30 MG/ML
15 INJECTION, SOLUTION INTRAMUSCULAR; INTRAVENOUS
Status: DISCONTINUED | OUTPATIENT
Start: 2023-12-08 | End: 2023-12-08 | Stop reason: HOSPADM

## 2023-12-08 RX ORDER — PROPOFOL 10 MG/ML
INJECTION, EMULSION INTRAVENOUS PRN
Status: DISCONTINUED | OUTPATIENT
Start: 2023-12-08 | End: 2023-12-08

## 2023-12-08 RX ADMIN — PHENYLEPHRINE HYDROCHLORIDE 200 MCG: 10 INJECTION INTRAVENOUS at 07:59

## 2023-12-08 RX ADMIN — LIDOCAINE HYDROCHLORIDE 100 MG: 20 INJECTION, SOLUTION INFILTRATION; PERINEURAL at 07:51

## 2023-12-08 RX ADMIN — ACETAMINOPHEN 975 MG: 325 TABLET, FILM COATED ORAL at 19:37

## 2023-12-08 RX ADMIN — OXYCODONE HYDROCHLORIDE 5 MG: 5 TABLET ORAL at 19:33

## 2023-12-08 RX ADMIN — METOPROLOL TARTRATE 100 MG: 50 TABLET, FILM COATED ORAL at 19:39

## 2023-12-08 RX ADMIN — SODIUM CHLORIDE, SODIUM GLUCONATE, SODIUM ACETATE, POTASSIUM CHLORIDE AND MAGNESIUM CHLORIDE: 526; 502; 368; 37; 30 INJECTION, SOLUTION INTRAVENOUS at 07:36

## 2023-12-08 RX ADMIN — SUGAMMADEX 200 MG: 100 INJECTION, SOLUTION INTRAVENOUS at 09:14

## 2023-12-08 RX ADMIN — ACETAMINOPHEN 975 MG: 325 TABLET, FILM COATED ORAL at 12:07

## 2023-12-08 RX ADMIN — DAPTOMYCIN 700 MG: 500 INJECTION, POWDER, LYOPHILIZED, FOR SOLUTION INTRAVENOUS at 07:49

## 2023-12-08 RX ADMIN — HYDROMORPHONE HYDROCHLORIDE 0.5 MG: 1 INJECTION, SOLUTION INTRAMUSCULAR; INTRAVENOUS; SUBCUTANEOUS at 08:44

## 2023-12-08 RX ADMIN — METOPROLOL TARTRATE 100 MG: 50 TABLET, FILM COATED ORAL at 12:08

## 2023-12-08 RX ADMIN — MELATONIN 3 MG: at 19:34

## 2023-12-08 RX ADMIN — PHENYLEPHRINE HYDROCHLORIDE 100 MCG: 10 INJECTION INTRAVENOUS at 09:08

## 2023-12-08 RX ADMIN — PHENYLEPHRINE HYDROCHLORIDE 200 MCG: 10 INJECTION INTRAVENOUS at 08:01

## 2023-12-08 RX ADMIN — AMLODIPINE BESYLATE 10 MG: 10 TABLET ORAL at 12:09

## 2023-12-08 RX ADMIN — ONDANSETRON 4 MG: 2 INJECTION INTRAMUSCULAR; INTRAVENOUS at 09:05

## 2023-12-08 RX ADMIN — SENNOSIDES 1 TABLET: 8.6 TABLET, FILM COATED ORAL at 12:09

## 2023-12-08 RX ADMIN — PHENYLEPHRINE HYDROCHLORIDE 200 MCG: 10 INJECTION INTRAVENOUS at 08:12

## 2023-12-08 RX ADMIN — QUETIAPINE FUMARATE 75 MG: 25 TABLET ORAL at 19:35

## 2023-12-08 RX ADMIN — Medication: at 19:39

## 2023-12-08 RX ADMIN — Medication 10 MG: at 08:18

## 2023-12-08 RX ADMIN — PHENYLEPHRINE HYDROCHLORIDE 100 MCG: 10 INJECTION INTRAVENOUS at 09:00

## 2023-12-08 RX ADMIN — Medication 50 MCG: at 12:09

## 2023-12-08 RX ADMIN — Medication 50 MG: at 07:51

## 2023-12-08 RX ADMIN — Medication 10 MG: at 08:44

## 2023-12-08 RX ADMIN — DAPTOMYCIN 700 MG: 500 INJECTION, POWDER, LYOPHILIZED, FOR SOLUTION INTRAVENOUS at 16:34

## 2023-12-08 RX ADMIN — Medication 10 ML: at 16:46

## 2023-12-08 RX ADMIN — SENNOSIDES 1 TABLET: 8.6 TABLET, FILM COATED ORAL at 19:33

## 2023-12-08 RX ADMIN — PANTOPRAZOLE SODIUM 40 MG: 40 TABLET, DELAYED RELEASE ORAL at 17:05

## 2023-12-08 RX ADMIN — Medication 5 ML: at 05:44

## 2023-12-08 RX ADMIN — PROPOFOL 200 MG: 10 INJECTION, EMULSION INTRAVENOUS at 07:51

## 2023-12-08 RX ADMIN — FENTANYL CITRATE 100 MCG: 50 INJECTION INTRAMUSCULAR; INTRAVENOUS at 07:51

## 2023-12-08 RX ADMIN — ATORVASTATIN CALCIUM 10 MG: 10 TABLET, FILM COATED ORAL at 19:36

## 2023-12-08 ASSESSMENT — ACTIVITIES OF DAILY LIVING (ADL)
ADLS_ACUITY_SCORE: 41
ADLS_ACUITY_SCORE: 43
ADLS_ACUITY_SCORE: 41
ADLS_ACUITY_SCORE: 41
ADLS_ACUITY_SCORE: 43
ADLS_ACUITY_SCORE: 43
ADLS_ACUITY_SCORE: 41
ADLS_ACUITY_SCORE: 43
ADLS_ACUITY_SCORE: 41
ADLS_ACUITY_SCORE: 43

## 2023-12-08 NOTE — ANESTHESIA PROCEDURE NOTES
Airway       Patient location during procedure: OR       Procedure Start/Stop Times: 12/8/2023 7:54 AM  Staff -        CRNA: Jone Maldonado APRN CRNA       Performed By: CRNAIndications and Patient Condition       Indications for airway management: filemon-procedural       Induction type:intravenous       Mask difficulty assessment: 1 - vent by mask    Final Airway Details       Final airway type: endotracheal airway       Successful airway: ETT - single  Endotracheal Airway Details        ETT size (mm): 7.0       Cuffed: yes       Successful intubation technique: direct laryngoscopy       DL Blade Type: MAC 3       Grade View of Cords: 1       Adjucts: stylet       Position: Right       Measured from: lips       Secured at (cm): 23       Bite block used: None    Post intubation assessment        Placement verified by: capnometry        Number of attempts at approach: 1       Secured with: tape       Ease of procedure: easy       Dentition: Intact    Medication(s) Administered   Medication Administration Time: 12/8/2023 7:54 AM

## 2023-12-08 NOTE — PLAN OF CARE
Goal Outcome Evaluation:    Isolation Status: contact for VRE  VS: stable on RA, afebrile  Neuro: A&O x4  BG: N/A  Labs/Imaging: Hgb 7.1 this morning. Received 1 unit PRBC's in OR.  HEENT: patient is legally blind  Respiratory: WDL  Cardiac: WDL  Pain/Nausea: Pain controlled with tylenol. Denies nausea  Diet: Regular diet  IV Access: L PIV, R double lumen PICC  Infusion(s): heparin drip on hold for now  Lines/Drains: Wound vac intact at 75 suction applied to R AKA in OR.  GI/: LBM 12/6. Oliguric.  Skin: Midline abdominal incision with 2 large primapores, CDI  Mobility: Ax2 with lift    Plan: Revision of amputation on 12/8. Notify MD of any significant changes, continue plan of care.

## 2023-12-08 NOTE — ANESTHESIA CARE TRANSFER NOTE
Patient: Alfredo Burnham    Procedure: Procedure(s):  REVISION, AMPUTATION STUMP, LEFT LOWER EXTREMITY       Diagnosis: Infection following a procedure, deep incisional surgical site, initial encounter [T81.42XA]  Diagnosis Additional Information: No value filed.    Anesthesia Type:   General     Note:    Oropharynx: oropharynx clear of all foreign objects  Level of Consciousness: awake  Oxygen Supplementation: nasal cannula  Level of Supplemental Oxygen (L/min / FiO2): 2  Independent Airway: airway patency satisfactory and stable        Patient transferred to: PACU    Handoff Report: Identifed the Patient, Identified the Reponsible Provider, Reviewed the pertinent medical history, Discussed the surgical course, Reviewed Intra-OP anesthesia mangement and issues during anesthesia, Set expectations for post-procedure period and Allowed opportunity for questions and acknowledgement of understanding      Vitals:  Vitals Value Taken Time   /82 12/08/23 0930   Temp     Pulse 82 12/08/23 0932   Resp 11 12/08/23 0932   SpO2 98 % 12/08/23 0932   Vitals shown include unfiled device data.    Electronically Signed By: ALTAGRACIA Looney CRNA  December 8, 2023  9:33 AM

## 2023-12-08 NOTE — PROGRESS NOTES
Vascular Surgery Progress Note  12/08/2023       Subjective:  Post-op check: doing well, LLE stump with ACE and VAC dressing on. No strikethrough blood, clean, dry, intact. Denies nausea or vomiting. Ready for floor.      Objective:  Temp:  [97.4  F (36.3  C)-98.2  F (36.8  C)] 97.7  F (36.5  C)  Pulse:  [78-89] 80  Resp:  [9-18] 18  BP: (107-142)/(69-87) 132/80  SpO2:  [94 %-100 %] 100 %    I/O last 3 completed shifts:  In: 523 [P.O.:523]  Out: 730 [Urine:730]      Gen: Awake, alert, NAD  CV: RRR per radial pulse  Resp: NLB on RA  Abd: soft, nondistended, nontender  Incision: abdominal incision  clean, dry, intact. LLE dressing VAC intact, with appropriate suction. ACE wrap on.   Ext: WWP, no edema.      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 Now growing VRE on wound cultures from 11/29. Given recent cultures, patient underwent first stage of stump revision on 12/8/2023 and VAC placement. Recovering well.     - Transfer to floor  - Pain controlled  - OK for reg diet  - Patient was due to see ENT next week,    - Resumed prior meds (except eliquis)  - ID following given VRE, recommended starting daptomycin - anticipating 6 week course. Picc placed 12/4/23.  - Holding Eliquis for now, will resume low-dose heparin drip without boluses tonight or tomorrow.  - VAC on LLE to be changed by vascular surgery only M/W/F      Discussed pt history, exam, assessment and plan with Dr. Lowe of the vascular surgery service, who is in agreement with the above.    Miryam Carrasco, [facility]  Vascular Surgery  Pager: [phone]  [email or web address]  Send message or 10 digit call back number Securely via [employer] with the [employer] Web Console (learn more here)

## 2023-12-08 NOTE — OR NURSING
Per peripheral vascular surgeon, Epic is down and the op note will get put in when they can get access to Epic.

## 2023-12-08 NOTE — PLAN OF CARE
/71 (BP Location: Left arm, Cuff Size: Adult Small)   Pulse 80   Temp 98.2  F (36.8  C) (Oral)   Resp 12   SpO2 98%     Shift: 3119-0676  Isolation Status: contact for VRE  VS: stable on RA, afebrile  Neuro: A&O x4  Behaviors: receptive to cares  BG: N/A  Labs/Imaging: Hgb 7.1  HEENT: patient is legally blind  Respiratory: WDL  Cardiac: WDL  Pain/Nausea: Denies pain and nausea overnight  PRN: Benadryl cream for itching/rash  Diet: NPO @ 0000 for procedure  IV Access: L PIV, R double lumen PICC  Infusion(s): heparin @ 500 units/hr, therapeutic  Lines/Drains: N/A  GI/: LBM 12/6. Oliguric, doesn't eat or drink much, urinal at bedside.  Skin: Midline abdominal incision with 2 large primapores, CDI. L AKA dressing changed by surgeons daily, CDI.   Mobility: Ax2 with lift  Events/Education: Per provider to keep heparin running throughout night per vascular surgery request.  Plan: Revision of amputation on 12/8 at 0730. Notify MD of any significant changes, continue plan of care.

## 2023-12-08 NOTE — ANESTHESIA POSTPROCEDURE EVALUATION
Patient: Alfredo Burnham    Procedure: Procedure(s):  REVISION, AMPUTATION STUMP, LEFT LOWER EXTREMITY       Anesthesia Type:  General    Note:  Disposition: Inpatient   Postop Pain Control: Uneventful            Sign Out: Well controlled pain   PONV: No   Neuro/Psych: Uneventful            Sign Out: Acceptable/Baseline neuro status   Airway/Respiratory: Uneventful            Sign Out: Acceptable/Baseline resp. status   CV/Hemodynamics: Uneventful            Sign Out: Acceptable CV status; No obvious hypovolemia; No obvious fluid overload   Other NRE: NONE   DID A NON-ROUTINE EVENT OCCUR? No           Last vitals:  Vitals Value Taken Time   /83 12/08/23 1015   Temp 36.6  C (97.8  F) 12/08/23 1010   Pulse 81 12/08/23 1021   Resp 10 12/08/23 1021   SpO2 98 % 12/08/23 1021   Vitals shown include unfiled device data.    Electronically Signed By: Bhupinder Servin MD  December 8, 2023  11:12 AM

## 2023-12-08 NOTE — PROGRESS NOTES
Infectious Disease - chart update:     Chart reviewed. Plan for OR today. Kindly send cultures of deep tissue and/or bone along with pathology, accordingly to adjust regimen if needs. Please refer to ID progress note, dated 12/6/2023 regarding details and recommendations.     ID team will continue to follow. Please reach out if any questions or concerns.     For urgent issues during weekend, please reach out to on-call ID provider per [facility]. I will assume [facility] ID service from Monday 12/11/2023 onwards.     Nidia Garcia MD  Infectious Disease Staff Physician  Pager: [phone]  [employer] [email or web address]   Date: 12/8/2023

## 2023-12-08 NOTE — PROGRESS NOTES
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 Now growing VRE on wound cultures from 11/29. Given recent cultures, patient underwent first stage of stump revision on 12/8/2023 and VAC placement. Recovering well. Clinical swallow eval completed per MD orders to further assess oropharyngeal swallow function.   Type of Evaluation   Type of Evaluation Swallow Evaluation   Oral Motor   Oral Musculature generally intact   Structural Abnormalities none present   Mucosal Quality adequate   Dentition (Oral Motor)   Dentition (Oral Motor) adequate dentition   Facial Symmetry (Oral Motor)   Facial Symmetry (Oral Motor) WNL   Lip Function (Oral Motor)   Lip Range of Motion (Oral Motor) WNL   Tongue Function (Oral Motor)   Tongue ROM (Oral Motor) WNL   Jaw Function (Oral Motor)   Jaw Function (Oral Motor) WNL   Cough/Swallow/Gag Reflex (Oral Motor)   Soft Palate/Velum (Oral Motor) WNL   Volitional Throat Clear/Cough (Oral Motor) WNL   Vocal Quality/Secretion Management (Oral Motor)   Vocal Quality (Oral Motor) dysphonic;harsh;hoarse   Secretion Management (Oral Motor) WNL   Comment, Vocal Quality/Secretion Management (Oral Motor) 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 Pt reported improvements with vocal quality since previous injection, however dysphonia persists.   General Swallowing Observations   Current Diet/Method of Nutritional Intake (General Swallowing Observations, NIS) thin liquids (level  0);regular diet   Respiratory Support room air   Past History of Dysphagia Pt familiar to SLP caseload with previous VFSS completed 11/28, with flash penetration of thin liquids. No aspiration and pt was eventually advanced to regular diet and thin liquids 11/30. Pt denied any trouble swallowing.   Swallowing Evaluation Clinical swallow evaluation   Clinical Swallow Evaluation   Feeding Assistance no assistance needed   Clinical Swallow Evaluation Textures Trialed thin liquids;solid foods   Clinical Swallow Eval: Thin Liquid Texture Trial   Mode of Presentation, Thin Liquids self-fed;cup   Volume of Liquid or Food Presented 8 oz   Oral Phase of Swallow WFL   Pharyngeal Phase of Swallow impaired;coughing/choking   Diagnostic Statement Thin liquids via cup resulted in overt s/sx of aspiration marked by coughing x1, however no other overt s/sx of aspiration.   Clinical Swallow Evaluation: Solid Food Texture Trial   Mode of Presentation self-fed   Volume Presented 3 tbsp   Oral Phase WFL   Pharyngeal Phase intact   Diagnostic Statement No overt s/sx of aspiration   Esophageal Phase of Swallow   Patient reports or presents with symptoms of esophageal dysphagia No   Swallowing Recommendations   Diet Consistency Recommendations thin liquids (level 0);regular diet   Supervision Level for Intake patient independent   Mode of Delivery Recommendations bolus size, small;food moistened;slow rate of intake   Swallowing Maneuver Recommendations alternate food and liquid intake;extra swallow   Monitoring/Assistance Required (Eating/Swallowing) monitor for cough or change in vocal quality with intake;stop eating activities when fatigue is present   Recommended Feeding/Eating Techniques (Swallow Eval) maintain upright sitting position for eating;maintain upright posture during/after eating for 30 minutes;minimize distractions during oral intake;set-up and prepare tray   Medication Administration Recommendations, Swallowing (SLP) as  tolerated   Instrumental Assessment Recommendations instrumental evaluation not recommended at this time   General Therapy Interventions   Planned Therapy Interventions Dysphagia Treatment   Dysphagia treatment Instruction of safe swallow strategies;Compensatory strategies for swallowing   Clinical Impression   Criteria for Skilled Therapeutic Interventions Met (SLP Eval) Current level of function same as previous level of function   SLP Diagnosis mild oropharyngeal dysphagia, dysphonia   Risks & Benefits of therapy have been explained evaluation/treatment results reviewed;care plan/treatment goals reviewed;risks/benefits reviewed;current/potential barriers reviewed;participants voiced agreement with care plan;participants included;patient;spouse/significant other   Clinical Impression Comments Clinical swallow eval completed per MD orders. Pt presents with mild acute on chronic oropharyngeal swallowing skills. 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 Thin liquids resulted in overt s/sx of aspiration marked by coughing x1, however no other overt s/sx of aspiration. Pt tolerated regular solid textures with no overt s/sx of aspiration. Functional time for mastication. Recommend regular textures and thin liquids. Pt should be fully upright and alert for all PO, take small sips/bites, slow pacing, and alternate between consistencies. ST to continue to follow for a short course to assess diet tolerance and train VF adduction exercises. Anticipate pt would benefit from OP SLP follow-up at [facility] at discharge.   SLP Discharge Recommendation home with outpatient therapy services   SLP Rationale for DC Rec pt would benefit from OP SLP follow-up at [facility] at discharge   SLP Brief overview of current status  Recommend regular textures and thin liquids. Pt should be fully upright and alert for all PO, take small sips/bites, slow  pacing, and alternate between consistencies.   Total Session Time   Total Session Time (sum of timed and untimed services) 16

## 2023-12-08 NOTE — PROGRESS NOTES
/71 (BP Location: Left arm, Cuff Size: Adult Small)   Pulse 80   Temp 98.2  F (36.8  C) (Oral)   Resp 12   SpO2 98%     Shift: 4560-9286  Isolation Status: contact  VS: stable on room air, afebrile  Neuro: Aox4  Behaviors: calm and cooperative  BG: none  Respiratory: WDL, denies SOB  Cardiac: WDL  Pain/Nausea: nausea, denies pain  PRN: Zofran x1  Diet: regular, poor appetite   IV Access: PICC double lumen, L PIV  Infusion(s): Heparin 500 units/hour  Lines/Drains: none  GI/: No BM this shift   Skin: Abdominal dressing-CDI, L AKA dressing-CDI  Mobility: A2 lift  Plan: Stump revision in the morning

## 2023-12-09 ENCOUNTER — APPOINTMENT (OUTPATIENT)
Dept: PHYSICAL THERAPY | Facility: CLINIC | Age: 68
DRG: 464 | End: 2023-12-09
Attending: SURGERY
Payer: COMMERCIAL

## 2023-12-09 LAB
APTT PPP: 68 SECONDS (ref 22–38)
CK SERPL-CCNC: 9 U/L (ref 39–308)

## 2023-12-09 PROCEDURE — 85730 THROMBOPLASTIN TIME PARTIAL: CPT | Performed by: SURGERY

## 2023-12-09 PROCEDURE — 250N000013 HC RX MED GY IP 250 OP 250 PS 637: Performed by: NURSE PRACTITIONER

## 2023-12-09 PROCEDURE — 82550 ASSAY OF CK (CPK): CPT | Performed by: SURGERY

## 2023-12-09 PROCEDURE — 250N000013 HC RX MED GY IP 250 OP 250 PS 637: Performed by: PHYSICIAN ASSISTANT

## 2023-12-09 PROCEDURE — 36592 COLLECT BLOOD FROM PICC: CPT | Performed by: SURGERY

## 2023-12-09 PROCEDURE — 97110 THERAPEUTIC EXERCISES: CPT | Mod: GP

## 2023-12-09 PROCEDURE — 120N000011 HC R&B TRANSPLANT UMMC

## 2023-12-09 PROCEDURE — 250N000013 HC RX MED GY IP 250 OP 250 PS 637

## 2023-12-09 PROCEDURE — 250N000011 HC RX IP 250 OP 636: Mod: JZ | Performed by: NURSE PRACTITIONER

## 2023-12-09 PROCEDURE — 97530 THERAPEUTIC ACTIVITIES: CPT | Mod: GP

## 2023-12-09 RX ORDER — HYDROXYZINE HYDROCHLORIDE 25 MG/1
25 TABLET, FILM COATED ORAL EVERY 6 HOURS PRN
Status: DISCONTINUED | OUTPATIENT
Start: 2023-12-09 | End: 2023-12-19 | Stop reason: HOSPADM

## 2023-12-09 RX ORDER — HYDROXYZINE HYDROCHLORIDE 25 MG/1
50 TABLET, FILM COATED ORAL EVERY 6 HOURS PRN
Status: DISCONTINUED | OUTPATIENT
Start: 2023-12-09 | End: 2023-12-19 | Stop reason: HOSPADM

## 2023-12-09 RX ADMIN — Medication 10 ML: at 16:25

## 2023-12-09 RX ADMIN — DOCUSATE SODIUM 50 MG: 50 CAPSULE, LIQUID FILLED ORAL at 07:47

## 2023-12-09 RX ADMIN — Medication 50 MCG: at 12:14

## 2023-12-09 RX ADMIN — METOPROLOL TARTRATE 100 MG: 50 TABLET, FILM COATED ORAL at 08:33

## 2023-12-09 RX ADMIN — Medication: at 07:48

## 2023-12-09 RX ADMIN — METOPROLOL TARTRATE 100 MG: 50 TABLET, FILM COATED ORAL at 18:44

## 2023-12-09 RX ADMIN — POLYETHYLENE GLYCOL 3350 17 G: 17 POWDER, FOR SOLUTION ORAL at 16:25

## 2023-12-09 RX ADMIN — PSYLLIUM HUSK 1 PACKET: 3.4 POWDER ORAL at 07:47

## 2023-12-09 RX ADMIN — Medication: at 10:16

## 2023-12-09 RX ADMIN — SENNOSIDES 1 TABLET: 8.6 TABLET, FILM COATED ORAL at 18:44

## 2023-12-09 RX ADMIN — Medication 60 ML: at 08:33

## 2023-12-09 RX ADMIN — ACETAMINOPHEN 975 MG: 325 TABLET, FILM COATED ORAL at 07:46

## 2023-12-09 RX ADMIN — HYDROXYZINE HYDROCHLORIDE 25 MG: 25 TABLET ORAL at 16:25

## 2023-12-09 RX ADMIN — MELATONIN 3 MG: at 18:44

## 2023-12-09 RX ADMIN — ACETAMINOPHEN 975 MG: 325 TABLET, FILM COATED ORAL at 13:51

## 2023-12-09 RX ADMIN — AMLODIPINE BESYLATE 10 MG: 10 TABLET ORAL at 07:46

## 2023-12-09 RX ADMIN — PANTOPRAZOLE SODIUM 40 MG: 40 TABLET, DELAYED RELEASE ORAL at 18:43

## 2023-12-09 RX ADMIN — Medication 5 ML: at 05:40

## 2023-12-09 RX ADMIN — ACETAMINOPHEN 975 MG: 325 TABLET, FILM COATED ORAL at 18:44

## 2023-12-09 RX ADMIN — SENNOSIDES 1 TABLET: 8.6 TABLET, FILM COATED ORAL at 07:47

## 2023-12-09 RX ADMIN — ATORVASTATIN CALCIUM 10 MG: 10 TABLET, FILM COATED ORAL at 18:45

## 2023-12-09 RX ADMIN — QUETIAPINE FUMARATE 75 MG: 25 TABLET ORAL at 18:45

## 2023-12-09 ASSESSMENT — ACTIVITIES OF DAILY LIVING (ADL)
ADLS_ACUITY_SCORE: 43
ADLS_ACUITY_SCORE: 41
ADLS_ACUITY_SCORE: 43
ADLS_ACUITY_SCORE: 41
ADLS_ACUITY_SCORE: 43
ADLS_ACUITY_SCORE: 43
ADLS_ACUITY_SCORE: 41
ADLS_ACUITY_SCORE: 43
ADLS_ACUITY_SCORE: 41
ADLS_ACUITY_SCORE: 41

## 2023-12-09 NOTE — PLAN OF CARE
Goal Outcome Evaluation:  /73 (BP Location: Left arm)   Pulse 81   Temp 98.6  F (37  C) (Oral)   Resp 16   SpO2 97%          Patient alert and oriented. Pain reported 5/10, refused intervention. Pt requesting pain med at bedtime. Tolerating regular diet. L stump dressing CDI. Wound vac at -75 suction. NO BM, passing gas. Encouraged drinking fluids and protein drink - refused.   Will continue with plan of care

## 2023-12-09 NOTE — OP NOTE
VASCULAR SURGERY OPERATIVE REPORT     LOCATION:    [facility]    Alfredo Burnham  Medical Record #:  [id number]  YOB: 1955  Age:  68 year old       Date of Service: 12/1/2023 - 12/8/2023     Preoperative diagnosis    Enterococcus Faecalis Infection of Amputation Stump with history of stool spillage on stump  Bacteremia  Status post repair of open pararenal aneurysm  Status post left lower extremity above knee amputation    Postoperative diagnosis    Enterococcus Faecalis Infection of Amputation Stump with history of stool spillage on stump  Bacteremia  Status post repair of open pararenal aneurysm  Status post left lower extremity above knee amputation    Surgeon: Boris Lowe MD    Procedures:  Irrigation and debridement left lower extremity amputation stump  Revision amputation with resection of bone for culture    Findings:   No purulence, however some necrotic tissue in the deep musculature that was sharply debrided    Indication for procedure:    Mr. Burnham is a 68 year old male who presented to with purulent drainage after repair of ruptured pararenal aneurysm and above knee amputation.  Risks, benefits, alternatives and indications including but not limited to death, anesthetic or cardiopulmonary complications, bleeding, infection, lymphatic leak, acute renal insufficiency requiring temporary or permanent dialysis, and failure to heal.  We discussed operative conduct including team approach, potential need for blood transfusion, and possibility of performing medical photography.  The patient and his wife Tabby understand the risks and would like to proceed with planned staged irrigation and debridement with cultures without plans for closure today\.          Description of procedure:    Operative details:    The patient was brought to the operating room.  Under satisfactory general anesthesia, he was placed in supine position with all pressure  points padded in standard fashion.  The left leg was widely prepped circumferentially and draped in sterile, standard fashion.  A surgical pause was performed with all members of the surgical team to verify patient, medical record number, birth date, planned surgical procedure, surgical site, allergies, fire risk and perioperative antibiotics.    The wound was carefully inspected after removal of the kerlix dressing. Some necrotic tissue was identified. The incision was opened through its length and some necrotic deep muscle was debrided with scissors and sent for culture over an area measuring 15x5x2 cm.  The bone was identified without evidence of gross infection, however the stump was shortened with a saw and both distal and proximal margins were sent for culture.  We  irrigated with 3 L of warm saline.   Once hemostasis was achieved decision was made to continue with planned staged returns to OR in the future rather than closure.  The wound was packed with one Xeroform over the bone and one black sponge.  A sterile negative pressure wound vac dressing was applied    he was transferred to the recovery in stable condition    Estimated blood loss: 25 ml     Specimens: Deep tissue, filemon femur tissue, Distal femur, proximal bone margin all sent for culture.     Complications: none apparent    Plan:  -Hold heparin today  -Continue wound vac  -Follow cultures          Boris Lowe MD, VI  VASCULAR AND ENDOVASCULAR SURGERY   [facility] Vascular Surgery

## 2023-12-09 NOTE — PROGRESS NOTES
/78   Pulse 79   Temp 98.6  F (37  C) (Oral)   Resp 16   SpO2 97%     SHIFT: 1348-6540  ISOLATION: Contact Precaution due to VRE  VITALS: Softer BP on RA, afebrile  BG: NA  Recent Labs   Lab 12/08/23  0732 12/07/23  0606 12/04/23  0513 12/02/23  1441   * 96 99 116*   NEURO: Aox4, Calm, Cooperative, Pleasant  Diet: Regular Diet  PAIN: Pt stated pain is 6/10 using 0-10 pain scale, 1x oxycodone, 1x scheduled tylenol, pt denies pain after reassessment   RESPIRATORY: Clear Upper & Lower Lobes Bilaterally  CARDIAC: S1 & S2 audible  : 600 mL voided  GI: last BM 12/06/23, No BM within shift  TUBES: NA  MIVF/GTT/ABX: NA  PIV: Left lower forearm PIV, Right Double Lumen   ASSIST: 2x assist with walker & Gaitbelt  SAFETY: WDL  SKIN: abdominal incision  LABS: No new labs  Recent Labs   Lab Test 12/08/23  0732 12/07/23  0606 12/04/23  1049 12/04/23  0513 12/02/23  1441 11/30/23  0556   POTASSIUM 4.1 4.0  --  3.9   < > 4.4   PHOS  --  4.1  --  4.5  --  4.5   MAG 1.8 1.8  --  1.9  --  1.9   HGB 7.4* 7.1* 8.0* 7.7*   < > 8.3*    < > = values in this interval not displayed.   PLAN: Continue w/poc & Notify MD of any changes

## 2023-12-09 NOTE — PROGRESS NOTES
Vascular Surgery Progress Note  12/09/2023       Subjective:  Patient is doing well.  Pain controlled.  Hemoglobin stable.     Objective:  Temp:  [97.8  F (36.6  C)-98.6  F (37  C)] 98.2  F (36.8  C)  Pulse:  [74-87] 83  Resp:  [14-16] 16  BP: (106-125)/(68-82) 110/73  SpO2:  [93 %-99 %] 93 %    I/O last 3 completed shifts:  In: 1590 [P.O.:360; I.V.:830; IV Piggyback:100]  Out: 800 [Urine:800]      Gen: Awake, alert, NAD  CV: RRR per radial pulse  Resp: NLB on RA  Abd: soft, nondistended, nontender  Incision: abdominal incision  clean, dry, intact. LLE dressing VAC intact with minimal output, with appropriate suction. ACE wrap on.   Ext: WWP, no edema.      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 Now growing VRE on wound cultures from 11/29. Given recent cultures, patient underwent first stage of stump revision on 12/8/2023 and VAC placement. Recovering well.     - Transfer to floor  - Pain controlled  - OK for reg diet  - Patient was due to see ENT next week,    - Resumed prior meds (except eliquis)  - ID following given VRE, recommended starting daptomycin - anticipating 6 week course. Picc placed 12/4/23.  - Resuming heparin drip today.  - VAC on LLE to be changed by vascular surgery only M/W/F     Discussed pt history, exam, assessment and plan with Dr. Lowe of the vascular surgery service, who is in  agreement with the above.   Spine appears normal, range of motion is not limited, no muscle or joint tenderness

## 2023-12-09 NOTE — PLAN OF CARE
/82 (BP Location: Left arm)   Pulse 77   Temp 98  F (36.7  C) (Oral)   Resp 16   SpO2 98%   Shift: 5657-0040  Isolation Status: contact for VRE  VS: stable on room air, afebrile  Neuro: Aox4  Behaviors: calm, able to make needs known  BG: N/A  Labs: hgb 7.4  Respiratory: WDL  Cardiac: WDL  Pain/Nausea: 5/10 pain managed with scheduled tylenol  PRN: PRN benadryl cream x2 for neck itchiness   Diet: regular, poor appetite  IV Access: double lumen R PICC, L PIV  Infusion(s): heparin gtt @700 U/hr  Lines/Drains: Vac on LLE  GI/: voiding without difficulty in urinal at bedside, no BM on shift  Skin: abd incision covered with primapore  Mobility: assist x 2/ lift  Events/Education: heparin gtt restarted  Plan: continue POC and notify team of changes

## 2023-12-10 LAB
APTT PPP: 46 SECONDS (ref 22–38)
APTT PPP: 56 SECONDS (ref 22–38)
APTT PPP: 60 SECONDS (ref 22–38)
APTT PPP: 71 SECONDS (ref 22–38)
ERYTHROCYTE [DISTWIDTH] IN BLOOD BY AUTOMATED COUNT: 16.2 % (ref 10–15)
HCT VFR BLD AUTO: 24.4 % (ref 40–53)
HGB BLD-MCNC: 7.8 G/DL (ref 13.3–17.7)
MCH RBC QN AUTO: 30.8 PG (ref 26.5–33)
MCHC RBC AUTO-ENTMCNC: 32 G/DL (ref 31.5–36.5)
MCV RBC AUTO: 96 FL (ref 78–100)
PLATELET # BLD AUTO: 91 10E3/UL (ref 150–450)
RBC # BLD AUTO: 2.53 10E6/UL (ref 4.4–5.9)
WBC # BLD AUTO: 5.5 10E3/UL (ref 4–11)

## 2023-12-10 PROCEDURE — 36592 COLLECT BLOOD FROM PICC: CPT | Performed by: SURGERY

## 2023-12-10 PROCEDURE — 250N000011 HC RX IP 250 OP 636: Mod: JZ | Performed by: STUDENT IN AN ORGANIZED HEALTH CARE EDUCATION/TRAINING PROGRAM

## 2023-12-10 PROCEDURE — 85730 THROMBOPLASTIN TIME PARTIAL: CPT | Performed by: SURGERY

## 2023-12-10 PROCEDURE — 120N000011 HC R&B TRANSPLANT UMMC

## 2023-12-10 PROCEDURE — 85027 COMPLETE CBC AUTOMATED: CPT | Performed by: NURSE PRACTITIONER

## 2023-12-10 PROCEDURE — 250N000013 HC RX MED GY IP 250 OP 250 PS 637: Performed by: PHYSICIAN ASSISTANT

## 2023-12-10 PROCEDURE — 36415 COLL VENOUS BLD VENIPUNCTURE: CPT | Performed by: SURGERY

## 2023-12-10 PROCEDURE — 250N000013 HC RX MED GY IP 250 OP 250 PS 637: Performed by: NURSE PRACTITIONER

## 2023-12-10 PROCEDURE — 258N000003 HC RX IP 258 OP 636: Performed by: STUDENT IN AN ORGANIZED HEALTH CARE EDUCATION/TRAINING PROGRAM

## 2023-12-10 PROCEDURE — 250N000011 HC RX IP 250 OP 636: Mod: JZ | Performed by: NURSE PRACTITIONER

## 2023-12-10 RX ADMIN — METOPROLOL TARTRATE 100 MG: 50 TABLET, FILM COATED ORAL at 08:53

## 2023-12-10 RX ADMIN — ACETAMINOPHEN 975 MG: 325 TABLET, FILM COATED ORAL at 07:49

## 2023-12-10 RX ADMIN — OXYCODONE HYDROCHLORIDE 5 MG: 5 TABLET ORAL at 21:13

## 2023-12-10 RX ADMIN — AMLODIPINE BESYLATE 10 MG: 10 TABLET ORAL at 07:49

## 2023-12-10 RX ADMIN — Medication 60 ML: at 07:48

## 2023-12-10 RX ADMIN — ACETAMINOPHEN 975 MG: 325 TABLET, FILM COATED ORAL at 14:01

## 2023-12-10 RX ADMIN — DOCUSATE SODIUM 50 MG: 50 CAPSULE, LIQUID FILLED ORAL at 07:48

## 2023-12-10 RX ADMIN — Medication 5 ML: at 18:30

## 2023-12-10 RX ADMIN — PANTOPRAZOLE SODIUM 40 MG: 40 TABLET, DELAYED RELEASE ORAL at 18:30

## 2023-12-10 RX ADMIN — METOPROLOL TARTRATE 100 MG: 50 TABLET, FILM COATED ORAL at 21:13

## 2023-12-10 RX ADMIN — SENNOSIDES 1 TABLET: 8.6 TABLET, FILM COATED ORAL at 21:13

## 2023-12-10 RX ADMIN — PSYLLIUM HUSK 1 PACKET: 3.4 POWDER ORAL at 07:49

## 2023-12-10 RX ADMIN — MELATONIN 3 MG: at 18:30

## 2023-12-10 RX ADMIN — HEPARIN SODIUM 850 UNITS/HR: 10000 INJECTION, SOLUTION INTRAVENOUS at 09:48

## 2023-12-10 RX ADMIN — HEPARIN SODIUM 850 UNITS/HR: 10000 INJECTION, SOLUTION INTRAVENOUS at 12:47

## 2023-12-10 RX ADMIN — DAPTOMYCIN 700 MG: 500 INJECTION, POWDER, LYOPHILIZED, FOR SOLUTION INTRAVENOUS at 15:58

## 2023-12-10 RX ADMIN — SENNOSIDES 1 TABLET: 8.6 TABLET, FILM COATED ORAL at 07:48

## 2023-12-10 RX ADMIN — ACETAMINOPHEN 975 MG: 325 TABLET, FILM COATED ORAL at 21:12

## 2023-12-10 RX ADMIN — QUETIAPINE FUMARATE 75 MG: 25 TABLET ORAL at 21:14

## 2023-12-10 RX ADMIN — Medication 50 MCG: at 11:17

## 2023-12-10 RX ADMIN — ATORVASTATIN CALCIUM 10 MG: 10 TABLET, FILM COATED ORAL at 21:14

## 2023-12-10 ASSESSMENT — ACTIVITIES OF DAILY LIVING (ADL)
ADLS_ACUITY_SCORE: 45
ADLS_ACUITY_SCORE: 47
ADLS_ACUITY_SCORE: 47
ADLS_ACUITY_SCORE: 45
ADLS_ACUITY_SCORE: 43
ADLS_ACUITY_SCORE: 47
ADLS_ACUITY_SCORE: 43
ADLS_ACUITY_SCORE: 43
ADLS_ACUITY_SCORE: 47

## 2023-12-10 NOTE — PROGRESS NOTES
BP 98/65 (BP Location: Left arm)   Pulse 73   Temp 97.9  F (36.6  C) (Oral)   Resp 16   SpO2 98%       Isolation Status: contact for VRE  VS: stable on RA, afebrile  Neuro: A&O x4  Labs:.60  HEENT: patient is legally blind  Respiratory: WDL  Cardiac: WDL  Pain/Nausea: denies pain, nausea  Diet: Regular diet  IV Access: L PIV, R double lumen PICC  Infusion(s): heparin drip @ 700 units/hr  Lines/Drains: Wound vac intact at 75 suction applied to R AKA in OR.  GI/: LBM 12/6. Voided 400 mls.  Skin: Midline abdominal incision with 2 large primapores, CDI  Mobility: Ax2 with lift   Plan:  continue plan of care.

## 2023-12-10 NOTE — PLAN OF CARE
Goal Outcome Evaluation:    Shift: 4359-9584  VS: Starr RA  Pain: Stump pain  5/10  Diet/Appetite:  Tolerating regular diet  /GI: NO BM since 12/6, given PRN Miralax - no results.Refused dinner.Patient drank protein drink.  LDA's: PICC double lumen, purple lumen infusing heparin 700 units/ hr  Skin: generalized dry skin and itchiness, given PRN atarax and lotion applied   Activity: not OOB    Pertinent Labs/: recheck PTT at 0100      Plan of care ongoing

## 2023-12-10 NOTE — PLAN OF CARE
/75 (BP Location: Left arm, Patient Position: Semi-Carpenter's, Cuff Size: Adult Regular)   Pulse 81   Temp 97.7  F (36.5  C) (Oral)   Resp 16   SpO2 99%     Shift: 9195-7099  Isolation Status: contact for VRE  VS: stable on room air, afebrile  Neuro: Aox4  Behaviors: calm. Able to make needs known  BG: N/A  Labs: PTT 46  Respiratory: WDL  Cardiac: WDL  Pain/Nausea: denies pain/nausea, scheduled tylenol  PRN: none given  Diet: regular diet, fair appetite  IV Access: double lumen R PICC, L PIV  Infusion(s): heparin gtt @850 U/hr  Lines/Drains: Vac on LLE  GI/: voids without difficulty in urinal at bedside, loose BM x1 on shift  Skin: abd incision covered with primapore  Mobility: assist x2/ lift  Events/Education: heparin gtt increased to 850 U/hr  Plan: continue POC and notify team of changes

## 2023-12-11 ENCOUNTER — APPOINTMENT (OUTPATIENT)
Dept: OCCUPATIONAL THERAPY | Facility: CLINIC | Age: 68
DRG: 464 | End: 2023-12-11
Attending: SURGERY
Payer: COMMERCIAL

## 2023-12-11 ENCOUNTER — APPOINTMENT (OUTPATIENT)
Dept: PHYSICAL THERAPY | Facility: CLINIC | Age: 68
DRG: 464 | End: 2023-12-11
Attending: SURGERY
Payer: COMMERCIAL

## 2023-12-11 LAB
ANION GAP SERPL CALCULATED.3IONS-SCNC: 10 MMOL/L (ref 7–15)
APTT PPP: 78 SECONDS (ref 22–38)
BUN SERPL-MCNC: 32.8 MG/DL (ref 8–23)
CALCIUM SERPL-MCNC: 9 MG/DL (ref 8.8–10.2)
CHLORIDE SERPL-SCNC: 108 MMOL/L (ref 98–107)
CREAT SERPL-MCNC: 2.05 MG/DL (ref 0.67–1.17)
DEPRECATED HCO3 PLAS-SCNC: 18 MMOL/L (ref 22–29)
EGFRCR SERPLBLD CKD-EPI 2021: 34 ML/MIN/1.73M2
ERYTHROCYTE [DISTWIDTH] IN BLOOD BY AUTOMATED COUNT: 16.3 % (ref 10–15)
GLUCOSE SERPL-MCNC: 100 MG/DL (ref 70–99)
HCT VFR BLD AUTO: 24.5 % (ref 40–53)
HGB BLD-MCNC: 7.7 G/DL (ref 13.3–17.7)
MAGNESIUM SERPL-MCNC: 1.9 MG/DL (ref 1.7–2.3)
MCH RBC QN AUTO: 31.2 PG (ref 26.5–33)
MCHC RBC AUTO-ENTMCNC: 31.4 G/DL (ref 31.5–36.5)
MCV RBC AUTO: 99 FL (ref 78–100)
PHOSPHATE SERPL-MCNC: 4.2 MG/DL (ref 2.5–4.5)
PLATELET # BLD AUTO: 93 10E3/UL (ref 150–450)
POTASSIUM SERPL-SCNC: 4.2 MMOL/L (ref 3.4–5.3)
RBC # BLD AUTO: 2.47 10E6/UL (ref 4.4–5.9)
SODIUM SERPL-SCNC: 136 MMOL/L (ref 135–145)
UFH PPP CHRO-ACNC: 0.12 IU/ML
WBC # BLD AUTO: 5.8 10E3/UL (ref 4–11)

## 2023-12-11 PROCEDURE — 36592 COLLECT BLOOD FROM PICC: CPT | Performed by: PHYSICIAN ASSISTANT

## 2023-12-11 PROCEDURE — 999N000044 HC STATISTIC CVC DRESSING CHANGE

## 2023-12-11 PROCEDURE — 85520 HEPARIN ASSAY: CPT | Performed by: SURGERY

## 2023-12-11 PROCEDURE — 36592 COLLECT BLOOD FROM PICC: CPT | Performed by: SURGERY

## 2023-12-11 PROCEDURE — 97530 THERAPEUTIC ACTIVITIES: CPT | Mod: GO

## 2023-12-11 PROCEDURE — 99232 SBSQ HOSP IP/OBS MODERATE 35: CPT | Mod: 24 | Performed by: STUDENT IN AN ORGANIZED HEALTH CARE EDUCATION/TRAINING PROGRAM

## 2023-12-11 PROCEDURE — 250N000013 HC RX MED GY IP 250 OP 250 PS 637

## 2023-12-11 PROCEDURE — 250N000013 HC RX MED GY IP 250 OP 250 PS 637: Performed by: PHYSICIAN ASSISTANT

## 2023-12-11 PROCEDURE — 97535 SELF CARE MNGMENT TRAINING: CPT | Mod: GO

## 2023-12-11 PROCEDURE — 83735 ASSAY OF MAGNESIUM: CPT | Performed by: PHYSICIAN ASSISTANT

## 2023-12-11 PROCEDURE — 80048 BASIC METABOLIC PNL TOTAL CA: CPT | Performed by: PHYSICIAN ASSISTANT

## 2023-12-11 PROCEDURE — 250N000011 HC RX IP 250 OP 636: Mod: JZ | Performed by: NURSE PRACTITIONER

## 2023-12-11 PROCEDURE — 97530 THERAPEUTIC ACTIVITIES: CPT | Mod: GP

## 2023-12-11 PROCEDURE — 250N000011 HC RX IP 250 OP 636: Mod: JZ | Performed by: STUDENT IN AN ORGANIZED HEALTH CARE EDUCATION/TRAINING PROGRAM

## 2023-12-11 PROCEDURE — 84100 ASSAY OF PHOSPHORUS: CPT | Performed by: PHYSICIAN ASSISTANT

## 2023-12-11 PROCEDURE — 99024 POSTOP FOLLOW-UP VISIT: CPT | Performed by: NURSE PRACTITIONER

## 2023-12-11 PROCEDURE — 85730 THROMBOPLASTIN TIME PARTIAL: CPT | Performed by: SURGERY

## 2023-12-11 PROCEDURE — 85027 COMPLETE CBC AUTOMATED: CPT | Performed by: PHYSICIAN ASSISTANT

## 2023-12-11 PROCEDURE — 250N000013 HC RX MED GY IP 250 OP 250 PS 637: Performed by: NURSE PRACTITIONER

## 2023-12-11 PROCEDURE — 120N000011 HC R&B TRANSPLANT UMMC

## 2023-12-11 RX ADMIN — ACETAMINOPHEN 975 MG: 325 TABLET, FILM COATED ORAL at 19:40

## 2023-12-11 RX ADMIN — PSYLLIUM HUSK 1 PACKET: 3.4 POWDER ORAL at 08:24

## 2023-12-11 RX ADMIN — ACETAMINOPHEN 975 MG: 325 TABLET, FILM COATED ORAL at 13:41

## 2023-12-11 RX ADMIN — Medication 50 MCG: at 11:34

## 2023-12-11 RX ADMIN — Medication: at 19:37

## 2023-12-11 RX ADMIN — Medication 5 ML: at 17:01

## 2023-12-11 RX ADMIN — HYDROXYZINE HYDROCHLORIDE 25 MG: 25 TABLET ORAL at 17:00

## 2023-12-11 RX ADMIN — MELATONIN 3 MG: at 18:46

## 2023-12-11 RX ADMIN — QUETIAPINE FUMARATE 75 MG: 25 TABLET ORAL at 19:43

## 2023-12-11 RX ADMIN — ATORVASTATIN CALCIUM 10 MG: 10 TABLET, FILM COATED ORAL at 19:42

## 2023-12-11 RX ADMIN — AMLODIPINE BESYLATE 10 MG: 10 TABLET ORAL at 08:23

## 2023-12-11 RX ADMIN — METOPROLOL TARTRATE 100 MG: 50 TABLET, FILM COATED ORAL at 08:25

## 2023-12-11 RX ADMIN — ACETAMINOPHEN 975 MG: 325 TABLET, FILM COATED ORAL at 08:25

## 2023-12-11 RX ADMIN — HEPARIN SODIUM 850 UNITS/HR: 10000 INJECTION, SOLUTION INTRAVENOUS at 15:45

## 2023-12-11 RX ADMIN — Medication 60 ML: at 08:23

## 2023-12-11 RX ADMIN — PANTOPRAZOLE SODIUM 40 MG: 40 TABLET, DELAYED RELEASE ORAL at 18:46

## 2023-12-11 RX ADMIN — METOPROLOL TARTRATE 100 MG: 50 TABLET, FILM COATED ORAL at 19:41

## 2023-12-11 ASSESSMENT — ACTIVITIES OF DAILY LIVING (ADL)
ADLS_ACUITY_SCORE: 45

## 2023-12-11 NOTE — PROGRESS NOTES
/77 (BP Location: Left arm, Cuff Size: Adult Regular)   Pulse 79   Temp 97.6  F (36.4  C) (Oral)   Resp 16   SpO2 100%     67 yo pt admitted 12/1/23 with drainage from his LAKA site concerning for infection, now POD11 washout, POD4 stump revision and wound VAC placement, hx of HTN, MAYA    Activity: A1, lift  Neuros:  A&O x4.   Cardiac:  WDL.   Respiratory: WDL on RA. Denies SOB.  GI/:  Voiding spontaneously via bedside urinal. +BS, passing flatus, Last BM today on day shift  Diet: Regular, poor PO intake  Skin: LAKA, BUE rosalie/bruised, heel dry/peeling  Lines: Double lumen PICC, purple lumen infusing hep at 850, red lumen hep locked, Left PIV SL  Incisions/Drains: Left thigh wound VAC at 75, Midline incision, wound managed by vascular surgery  Labs: PTT 71 at 2051, next check at 0344  Pain/nausea: Abdominal and left thigh pain, oxy 5 mg x1 this evening for pain, denies nausea  New changes this shift: None  Plan:  Continue to monitor PTT level and adjust Hep infusion as appropriate

## 2023-12-11 NOTE — PLAN OF CARE
/70 (BP Location: Left arm)   Pulse 75   Temp 97.9  F (36.6  C) (Oral)   Resp 16   SpO2 98%   Shift: 9449-6005  Isolation Status: contact for VRE  VS: stable on room air, afebrile  Neuro: Aox4  Behaviors: calm, able to make needs known   BG: N/A  Labs: PTT 78  Respiratory: WDL  Cardiac: WDL  Pain/Nausea: denies pain/ nausea, on scheduled tylenol  PRN: none given  Diet: regular diet, fair appetite  IV Access: double lumen PICC, L PIV  Infusion(s): heparin gtt @850 U/hr, PTT check in the am  Lines/Drains: Vac ultra @75   GI/: voiding spontaneously without difficulty, no BM on shift   Skin: abd incision covered  Mobility: assist x1  Events/Education: LLE wound dressing changed by vascular, worked with PT/ OT today  Plan: continue POC and notify team of changes

## 2023-12-11 NOTE — PROGRESS NOTES
[facility]: PROGRESS NOTE      Patient:  Alfredo Burnham   YOB: 1955, MRN: [id number]  Date of Visit: 12/11/2023  Date of Admission: 12/1/2023  Consult Requester: Ash Boucher MBBS          Assessment and Recommendations:     ID Problem List:  Left AKA stump infection s/p I&D 12/1/2023  Intra-op cxs 12/8 - from tissue, Enterococcus faecium VRE, Staph epidermidis and from bone - NGTD  Intra-op cxs 12/1 - Enterococcus faecium VRE  Non surgical wound cxs 11/29, VRE (E.faecium), Daptomycin DSS (VEELIO 3), S- linezolid, tigecycline  LLE ischemia S/p attempted thrombectomy, AKA on 10/17/23   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 Eventually discharged to ARU, relatively doing well until 11/29, when noticed malodorous drainage from lateral side of stump incision, when malfunction of wound vac. Afebrile, hemodynamically stable. Pre-op cultures grew VRE, susceptibility reviewed (Daptomycin DSS (EVELIO 3), S- linezolid, tigecycline). Underwent I&D by vascular surgery. Intra-op cultures are obtained, also growing VRE pending susceptibility. Reviewed OP findings, and per discussion with primary team, debridement up to bone level. Based on this information, anticipate at least 4-6 weeks of antibiotic course. Started iv Daptomycin, and based on susceptibility data, suggest to increase dose. Other antibiotic options are limited, and would like to avoid at this time due to side effects profile (Linezolid would cause cytopenia, and tigecycline with nausea/vomiting) while treating for 6 weeks course.  Discussed the consideration of po linezolid towards end of treatment course, if stump site continues to heal well. However, for now, plan to continue iv  daptomycin.     Underwent another surgery 12/8/2023, and plan to RTOR for formal closure of stump site. Accordingly, to decide on start date of 6 weeks of antibiotic course.     Recommendations:  Awaiting RTOR for stump closure  Continue iv Daptomycin 12mg/kg daily, needs renal dose adjustment.   Anticipate 6 weeks antibiotic course  Exact date to be decided once complete final surgery  Local wound care of stump site per surgery team    Updated patient and family at bedside.     Thank you for the consult. ID team will continue to follow. Please reach out if any questions or concerns.     Total time spent during this encounter (chart review, documentation, MDM, coordination of care): 40 minutes    Nidia Garcia MD   Infectious Diseases Staff Physician  Pager: [phone]  Ivaldi anayeli   12/11/2023         Interval History and Events:     Seen and evaluated at bedside, accompanied by his wife. No new complaints today. Relatively doing well since surgery on 12/8.          HPI as adopted from initial ID consult on 12/2/2023:     Alfredo Burnham is a 67 yo M with PMHx of HTN, 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, recent hospitalization (9/24 - 11/17, 2023) for AAA s/p open repair 9/24/2023, c/b LLE ischemia s/p unsuccessful thrombectomy s/p AKA (10/17/2023), MAYA requiring HD via R internal jugular tunneled and removal due to recovered renal function, embolic CVA, left iliac, psoas muscle hematoma, dysphonia 2/2 left vocal fold paralysis. Eventually discharged to ARU (11/17 - 11/30), and required to admit due to left AKA stump site infection. Alfredo was relatively doing well  until 11/29, when noted wound vac malfunction, and significant amount of serosanguineous, purulent appearing malodorous drainage, expressed out with pressure application, from an small opening on lateral aspect of stump incision. 11/29 Wound and blood cultures obtained and transferred to [facility]. Underwent I&D 12/1. Intra-op findings:  "\"producing again a large volume of brownish fluid....was a small amount of murky looking subcutaneous tissue\". Intra-op cultures were taken from the cavity as the fluid was emanating. 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 ID consult is requested for antibiotic management.      Social hx: Lives with wife (Tabby) in an apartment [address]. They do not have any pets in the home.          Review of Systems:   Targeted 10 point ROS was completed with pertinent positives and negatives are detailed above.         Antimicrobial history:     Daptomycin 12/2 - present         Physical Examination:   Temp:  [97.6  F (36.4  C)-97.7  F (36.5  C)] 97.6  F (36.4  C)  Pulse:  [] 103  Resp:  [16-18] 16  BP: (122-134)/(75-82) 130/80  SpO2:  [98 %-100 %] 100 %    Exam:   GENERAL:  Well-developed, well-nourished, supine, in no acute distress.   Head: normocephalic, atraumatic.   EYES: PERRLA, EOMI, conjunctiva clear, anicteric sclerae.    ENT:  Oropharynx is moist without exudates or ulcers.  NECK:  Supple.  LUNGS:  Breathing comfortably, on room air, clear to auscultation bilaterally, no w/r/r.  CARDIOVASCULAR:  Regular rate and rhythm, +S1S2 with no murmurs, gallops or rubs.  ABDOMEN:  Normal bowel sounds, soft, nontender. No appreciable hepatosplenomegaly.   : no suprapubic or flank tendterness.   EXT: Left AKA stump - covered in surgical dressing and +wound vac  SKIN:  No acute rashes.    NEUROLOGIC:  Grossly nonfocal.      Lines and devices:   PIV is in place without any surrounding erythema.     Labs, Microbiology and Imaging studies are reviewed.   12/8 intra-op cultures from tissue, Enterococcus faecium VRE, Staph epidermidis and from bone - NGTD  12/1 intra-op cxs VRE  11/29 wound cxs VRE, Daptomycin DSS (EVELIO 3), S- linezolid, tigecycline  11/29 blood cultures NGTD         Laboratory Data:     Hematology Studies    Recent Labs   Lab Test 12/11/23  0610 12/10/23  0755 12/08/23  0732 12/07/23  0606 " 12/04/23  1049 12/04/23  0513 10/27/23  1255 10/27/23  0548 10/26/23  1051 10/26/23  0700 10/24/23  0756 10/24/23  0651 10/23/23  0719 10/23/23  0555 10/22/23  1309 10/22/23  0422 10/21/23  0354   WBC 5.8 5.5 4.6 5.0 8.3 6.8   < > 11.5*   < > 13.2*   < > 12.4*  --  12.6*  --  11.3* 13.9*   ANEU  --   --   --   --   --   --   --  8.1  --  9.4*  --  9.5*  --  9.3*  --  8.4* 9.3*   AEOS  --   --   --   --   --   --   --  3.0*  --  2.4*  --  1.9*  --  2.4*  --  2.0* 1.8*   HGB 7.7* 7.8* 7.4* 7.1* 8.0* 7.7*   < > 8.4*   < > 8.4*   < > 5.8*   < > 6.8*   < > 7.1* 8.0*   MCV 99 96 96 96 98 98   < > 94   < > 96   < > 95  --  96  --  94 90   PLT 93* 91* 93* 95* 113* 127*   < > 124*   < > 128*   < > 142*  --  185  --  167 159    < > = values in this interval not displayed.       Metabolic Studies     Recent Labs   Lab Test 12/11/23  0610 12/08/23  0732 12/07/23  0606 12/04/23  0513 12/02/23  1441    134* 134* 133* 133*   POTASSIUM 4.2 4.1 4.0 3.9 4.5   CHLORIDE 108* 105 104 105 104   CO2 18* 18* 18* 16* 17*   BUN 32.8* 32.2* 32.3* 48.5* 49.5*   CR 2.05* 1.93* 2.03* 2.09* 2.10*   GFRESTIMATED 34* 37* 35* 33* 33*       Hepatic Studies    Recent Labs   Lab Test 12/08/23  0732 10/30/23  0651 10/29/23  0401 10/28/23  0525 10/27/23  0548 10/26/23  0700   BILITOTAL 0.3 0.5 0.4 0.5 0.4 0.4   ALKPHOS 71 132* 128 133* 126 111   ALBUMIN 3.5 3.1* 3.0* 3.1* 3.0* 2.8*   AST 18 24 25 34 38 52*   ALT 16 15 19 25 26 20       Urine Studies    Recent Labs   Lab Test 10/01/23  1049 09/30/23  1029 09/30/23  0648   LEUKEST Negative Negative Negative   WBCU 7* 7* 12*       Last check of C difficile  C Difficile Toxin B by PCR   Date Value Ref Range Status   11/06/2023 Negative Negative Final     Comment:     A negative result does not exclude actual disease due to C. difficile and may be due to improper collection, handling and storage of the specimen or the number of organisms in the specimen is below the detection limit of the assay.        Hepatitis B Testing   Recent Labs   Lab Test 10/27/23  1255 10/06/23  1543   HEPBANG Nonreactive Nonreactive         Microbiology:    Lab Results   Component Value Date    CULTURE No anaerobic organisms isolated after 2 days 12/08/2023    CULTURE No growth after 2 days 12/08/2023    CULTURE Culture in progress 12/08/2023

## 2023-12-11 NOTE — PROGRESS NOTES
Vascular Surgery Progress Note  12/11/2023       Subjective:  Felling well this morning, NAEO.  No VAC output has been charted since placement. Approximately 300-400ml in canister.      Objective:  Temp:  [97.6  F (36.4  C)-97.7  F (36.5  C)] 97.6  F (36.4  C)  Pulse:  [75-81] 75  Resp:  [16-18] 16  BP: (108-134)/(64-82) 134/82  SpO2:  [98 %-100 %] 100 %    I/O last 3 completed shifts:  In: 220 [P.O.:220]  Out: 1075 [Urine:1075]      Gen: Awake, alert, NAD  CV: RRR per radial pulse  Resp: NLB on RA  Abd: soft, nondistended, nontender  Incision: abdominal incision  clean, dry, intact. LLE dressing VAC intact with minimal output, with appropriate suction. Wound is healing well, without sloughing, no bleeding upon inspection, has granulation tissue. No purulent drainage noted. ACE wrap on at all times.   Ext: WWP, no edema.      Labs:  Recent Labs   Lab 12/11/23  0610 12/10/23  0755 12/08/23  0732   WBC 5.8 5.5 4.6   HGB 7.7* 7.8* 7.4*   PLT 93* 91* 93*       Recent Labs   Lab 12/11/23  0610 12/08/23  0732 12/07/23  0606    134* 134*   POTASSIUM 4.2 4.1 4.0   CHLORIDE 108* 105 104   CO2 18* 18* 18*   BUN 32.8* 32.2* 32.3*   CR 2.05* 1.93* 2.03*   * 105* 96   OMAR 9.0 9.2 9.1   MAG 1.9 1.8 1.8   PHOS 4.2  --  4.1        Assessment/Plan:   68 year old male well known to vascular surgery w/ h/o rutpured pararenal aneurysm s/p open repair in September of this year who was found to have drainage from his stump site concerning for infection now s/p washout on 12/1. Now growing VRE on wound cultures from 11/29. Given recent cultures, patient underwent first stage of stump revision on 12/8/2023 and VAC placement. Recovering well. Bone biopsy proximal culture with VRE, distal culture without growth so far.      - Pain controlled  - Regular diet  - Patient was due to see ENT next week, reached out to ENT.  - Resumed prior meds (except eliquis)  - ID following given VRE, recommended starting daptomycin - anticipating  6 week course. Picc placed 12/4/23.  - Heparin drip low intensity.  - VAC on LLE to be changed by vascular surgery only M/W/F (changed 12/11/23).      Discussed pt history, exam, assessment and plan with Dr. Lowe of the vascular surgery service, who is in agreement with the above.    Miryam Carrasco, [facility]  Vascular Surgery  Pager: [phone]  [email or web address]  Send message or 10 digit call back number Securely via [employer] with the [employer] Web Console (learn more here)

## 2023-12-11 NOTE — PROGRESS NOTES
CLINICAL NUTRITION SERVICES - ASSESSMENT NOTE     Nutrition Prescription    RECOMMENDATIONS FOR MDs/PROVIDERS TO ORDER:  None at this time    Malnutrition Status:    Moderate malnutrition in the context of chronic illness    Recommendations already ordered by Registered Dietitian (RD):  -D/c'd expedite cup per pt request    Future/Additional Recommendations:  Monitor PO intake, supplement intake, weights, labs, malnutrition status, wound healing     REASON FOR ASSESSMENT  Alfredo Burnham is a 68 year old male assessed by the dietitian for Admission Nutrition Risk Screen for positive (lost wt and eating poorly) and LOS    NUTRITION HISTORY  Alfredo Burnham is a 68 year old with past medical history of hypertension, TIA, 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, thrombocytopenia, and tobacco use disorder who presented on 9/24/23 with severe abdominal pain radiating to back, found to have contained, ruptured abdominal aortic aneurysm. On 10/17, patient ultimately underwent left above knee amputation due to ischemia.  He was successfully extubated on 10/19. He returned to OR on 10/20 for I&D of open midline abdominal incision and delayed primary closure.  Prevena wound vac was placed to E on 10/23.     Pt has been seen by [facility] on [facility].     CURRENT NUTRITION ORDERS  Diet: Regular, room service with assist  Snacks & Supplements: Ensure enlive TID, Magic cup TID, Expedite cup  Intake/Tolerance: Met with pt and wife in room. Pt shared that he has been eating up to 3/4 of his meals, which has increased slightly since starting an oral diet again. He says his intake now is about his normal, which at home was usually 2 good meals per day and 1 light meal. Pt and wife shared that there were issues with his meal today, as they have been waiting for it to be delivered. RD provided pt with 2 cafe passes to get a meal upstairs in the cafeteria. He likes magic cup, and drinks maybe 1 ensure/day, though he prefers boost  "which his wife may bring in for him. RD shared supplement list and encouraged pt to order various supplements to find things he likes. He shares he no longer wants the expedite cups sent, but sometimes will drink the expedite bottles. He says they are bitter, and RD shared a tip to dilute it in water or juice to sip on.    Per flowsheets, intake documentation likely missing some meals/snacks, but on average 0-100% of meals consumed. Health Touch shows patient is ordering 2-3 meals per day. Per 7 day average, pt is ordering 2032 kcal and 106 g protein per Health[employer]. Per report of consuming ~75% of meals, patient is consuming ~1524 kcals (25 kcal/kg) and 79 g protein (1.33 g/kg).    LABS  Labs reviewed  BUN 32.8 (H)  Crt 2.05 (H)     MEDICATIONS  Medications reviewed  Lipitor, lopressor, Vit D3, metamucil, protonix     ANTHROPOMETRICS  Height: 170.2 cm (5'7\") - 11/17/23  Most Recent Weight: 59.3 kg (130 lb 11.7 oz) - 12/1/23  IBW: 54.6 kg - adjusted for AKA  BMI: Normal BMI  Weight History: Down 12.5 kg (17%) in 1 month - question accuracy after AKA. Pt shared that he weighed ~160# prior to hospitalization.     Wt Readings from Last 10 Encounters:   12/01/23 59.3 kg (130 lb 11.7 oz)   11/17/23 64.6 kg (142 lb 6.7 oz)   02/08/11 75.8 kg (167 lb)     11/24/23 58.1 kg (128 lb 1.4 oz)     11/17/23 0854             64.6 kg (142 lb 6.7 oz)  11/13/23 0248             67.7 kg (149 lb 4 oz)  11/12/23 0500             68.8 kg (151 lb 10.8 oz)  11/10/23 0645             70.1 kg (154 lb 8.7 oz)  11/09/23 0207             70.4 kg (155 lb 3.3 oz)  11/08/23 0035             69 kg (152 lb 1.9 oz)  11/05/23 0628             68.4 kg (150 lb 12.7 oz)  11/04/23 0335             68.5 kg (151 lb 0.2 oz)  11/03/23 0628             70.2 kg (154 lb 12.2 oz)  10/30/23 0400             71.8 kg (158 lb 4.6 oz)  10/26/23 0000             72.9 kg (160 lb 11.5 oz)  10/23/23 0000             74.4 kg (164 lb 0.4 oz)  10/21/23 0000             76.1 " kg (167 lb 12.3 oz)  10/20/23 0400             81 kg (178 lb 9.2 oz)  10/17/23 0025             79.8 kg (175 lb 14.8 oz) - AKA  10/16/23 0400             78.9 kg (173 lb 15.1 oz)  10/13/23 0200             76.7 kg (169 lb 1.5 oz)  10/12/23 0000             77.2 kg (170 lb 3.1 oz)  10/09/23 0500             79.8 kg (175 lb 14.8 oz)  10/04/23 0400             89.1 kg (196 lb 6.9 oz)  09/28/23 0000             95.4 kg (210 lb 5.1 oz)  09/26/23 2200             87.5 kg (192 lb 14.4 oz)  09/25/23 2000             83.2 kg (183 lb 6.8 oz)  09/25/23 0350             79.4 kg (175 lb 0.7 oz)    Dosing Weight: 59.3 kg - admit weight     ASSESSED NUTRITION NEEDS  Estimated Energy Needs: 1151-5266 kcals/day (30-35 kcals/kg)  Justification: Increased needs, Repletion, and Wound healing  Estimated Protein Needs: 71-89 grams protein/day (1.2 - 1.5 grams of pro/kg)  Justification: Increased needs, Repletion, and Wound healing  Estimated Fluid Needs: 3900-7225 mL/day (1 mL/kcal)   Justification: Maintenance and Per provider pending fluid status    PHYSICAL FINDINGS  See malnutrition section below.    1+ trace edema  Edson 15, nutrition 2-> probably inadequate  AKA 10/17/23, infection in surgical site    MALNUTRITION  % Intake: < 75% for >/= 1 month (moderate)  % Weight Loss: > 5% in 1 month (severe)  Subcutaneous Fat Loss: Facial region:  mild  Muscle Loss: Temporal:  mild, Upper leg (quadricep, hamstring):  mild, Patellar region:  mild, and Posterior calf:  mild  Fluid Accumulation/Edema: Does not meet criteria  Malnutrition Diagnosis: Moderate malnutrition in the context of chronic illness    NUTRITION DIAGNOSIS  Malnutrition related to extended length of stay in hospital and reduced oral intake as evidenced by oral intake of <75% of nutrient needs for >1 month, weight loss of >5% in 1 month, and mild subcutaneous fat and muscle loss.     INTERVENTIONS  Implementation  -Nutrition education for nutrition relationship to  health/disease - Explained the importance of adequate calories & protein for wound healing  -Nutrition education for recommended modifications - Encouraged pt to order nutrient dense foods with high protein  -Medical food supplement therapy - Continue magic cup & ensure enlive with meals  -Modify composition of meals/snacks - Include sources of protein with meals    Goals  Total avg nutritional intake to meet a minimum of 30 kcal/kg and 1.2 g PRO/kg daily (per dosing wt 59.3 kg).     Monitoring/Evaluation  Progress toward goals will be monitored and evaluated per protocol.  Anabel Marrero, MPH, RDN, LD   7A/7B (beds 219-229) RD pager: [phone]  Weekend/Holiday RD pager: [phone]

## 2023-12-11 NOTE — PROGRESS NOTES
Brief Otolaryngology progress Note  12/11/23    Patient seen at bedside at request of primary team. He had an outpatient ENT appointment with Dr. Cobb scheduled for tomorrow for his left vocal cord paresis, s/p vocal cord injection 11/2/23. He has been tolerating a regular diet, but reports ongoing weak voice. His wife thinks his voice has improved since he was last in the hospital, but still is not normal. Per primary team, he may require further surgical interventions with intubations this hospitalization. Since he is currently tolerating a regular diet and does not have any respiratory concerns, there is no urgent or acute ENT intervention indicated at this time. We discussed that typically vocal cord injections last for 2-3 months. We will reschedule his outpatient follow up in the coming weeks for repeat evaluation once there are no further intubations planned.      - Message sent to ENT clinic to reschedule outpatient ENT follow up  - Agree with ongoing SLP for voice and swallowing exercises  - Remainder of care per primary team.    Cora Gonzalez PA-C  Otolaryngology-Head & Neck Surgery  Please contact ENT by dialing [phone] and entering job code [phone].

## 2023-12-11 NOTE — PLAN OF CARE
/77 (BP Location: Left arm, Cuff Size: Adult Regular)   Pulse 79   Temp 97.6  F (36.4  C) (Oral)   Resp 16   SpO2 100%     Shift: 3835-2547  VS: stable on RA, afebrile  Neuro: AOx4  BG: none  Labs: pending am collections  Respiratory: WNL   Pain/Nausea/PRN: denies nausea or pain  Diet: reg  LDA: double lumen PICC; L PIV   GI/: voiding adequately; LBM 12/11   Skin: rosalie dry skin, ABD incision   Mobility: assist of 2 with lift   Plan: continue POC     Handoff given to following RN.

## 2023-12-12 ENCOUNTER — APPOINTMENT (OUTPATIENT)
Dept: OCCUPATIONAL THERAPY | Facility: CLINIC | Age: 68
DRG: 464 | End: 2023-12-12
Attending: SURGERY
Payer: COMMERCIAL

## 2023-12-12 ENCOUNTER — APPOINTMENT (OUTPATIENT)
Dept: SPEECH THERAPY | Facility: CLINIC | Age: 68
DRG: 464 | End: 2023-12-12
Attending: SURGERY
Payer: COMMERCIAL

## 2023-12-12 ENCOUNTER — APPOINTMENT (OUTPATIENT)
Dept: PHYSICAL THERAPY | Facility: CLINIC | Age: 68
DRG: 464 | End: 2023-12-12
Attending: SURGERY
Payer: COMMERCIAL

## 2023-12-12 LAB
APTT PPP: 80 SECONDS (ref 22–38)
BACTERIA TISS BX CULT: ABNORMAL
HOLD SPECIMEN: NORMAL
UFH PPP CHRO-ACNC: 0.2 IU/ML
UFH PPP CHRO-ACNC: 0.28 IU/ML

## 2023-12-12 PROCEDURE — 250N000013 HC RX MED GY IP 250 OP 250 PS 637: Performed by: PHYSICIAN ASSISTANT

## 2023-12-12 PROCEDURE — 85520 HEPARIN ASSAY: CPT | Performed by: SURGERY

## 2023-12-12 PROCEDURE — 92526 ORAL FUNCTION THERAPY: CPT | Mod: GN

## 2023-12-12 PROCEDURE — 250N000013 HC RX MED GY IP 250 OP 250 PS 637: Performed by: NURSE PRACTITIONER

## 2023-12-12 PROCEDURE — 97535 SELF CARE MNGMENT TRAINING: CPT | Mod: GO

## 2023-12-12 PROCEDURE — 250N000011 HC RX IP 250 OP 636: Mod: JZ | Performed by: NURSE PRACTITIONER

## 2023-12-12 PROCEDURE — 250N000013 HC RX MED GY IP 250 OP 250 PS 637

## 2023-12-12 PROCEDURE — 258N000003 HC RX IP 258 OP 636: Performed by: STUDENT IN AN ORGANIZED HEALTH CARE EDUCATION/TRAINING PROGRAM

## 2023-12-12 PROCEDURE — 120N000011 HC R&B TRANSPLANT UMMC

## 2023-12-12 PROCEDURE — 85730 THROMBOPLASTIN TIME PARTIAL: CPT | Performed by: TRANSPLANT SURGERY

## 2023-12-12 PROCEDURE — 250N000011 HC RX IP 250 OP 636: Mod: JZ | Performed by: STUDENT IN AN ORGANIZED HEALTH CARE EDUCATION/TRAINING PROGRAM

## 2023-12-12 PROCEDURE — 99024 POSTOP FOLLOW-UP VISIT: CPT | Performed by: NURSE PRACTITIONER

## 2023-12-12 PROCEDURE — 36592 COLLECT BLOOD FROM PICC: CPT | Performed by: SURGERY

## 2023-12-12 PROCEDURE — 97530 THERAPEUTIC ACTIVITIES: CPT | Mod: GP

## 2023-12-12 PROCEDURE — 250N000011 HC RX IP 250 OP 636: Mod: JZ | Performed by: SURGERY

## 2023-12-12 PROCEDURE — 97530 THERAPEUTIC ACTIVITIES: CPT | Mod: GO

## 2023-12-12 RX ORDER — HEPARIN SODIUM 10000 [USP'U]/100ML
0-5000 INJECTION, SOLUTION INTRAVENOUS CONTINUOUS
Status: DISCONTINUED | OUTPATIENT
Start: 2023-12-12 | End: 2023-12-18

## 2023-12-12 RX ADMIN — HYDROXYZINE HYDROCHLORIDE 25 MG: 25 TABLET ORAL at 05:33

## 2023-12-12 RX ADMIN — METOPROLOL TARTRATE 100 MG: 50 TABLET, FILM COATED ORAL at 09:28

## 2023-12-12 RX ADMIN — QUETIAPINE FUMARATE 75 MG: 25 TABLET ORAL at 20:33

## 2023-12-12 RX ADMIN — OXYCODONE HYDROCHLORIDE 5 MG: 5 TABLET ORAL at 18:49

## 2023-12-12 RX ADMIN — ACETAMINOPHEN 975 MG: 325 TABLET, FILM COATED ORAL at 07:50

## 2023-12-12 RX ADMIN — DOCUSATE SODIUM 50 MG: 50 CAPSULE, LIQUID FILLED ORAL at 07:50

## 2023-12-12 RX ADMIN — Medication: at 20:40

## 2023-12-12 RX ADMIN — SENNOSIDES 1 TABLET: 8.6 TABLET, FILM COATED ORAL at 07:51

## 2023-12-12 RX ADMIN — SENNOSIDES 1 TABLET: 8.6 TABLET, FILM COATED ORAL at 20:34

## 2023-12-12 RX ADMIN — ATORVASTATIN CALCIUM 10 MG: 10 TABLET, FILM COATED ORAL at 20:34

## 2023-12-12 RX ADMIN — DAPTOMYCIN 700 MG: 500 INJECTION, POWDER, LYOPHILIZED, FOR SOLUTION INTRAVENOUS at 15:34

## 2023-12-12 RX ADMIN — MELATONIN 3 MG: at 18:49

## 2023-12-12 RX ADMIN — PANTOPRAZOLE SODIUM 40 MG: 40 TABLET, DELAYED RELEASE ORAL at 17:34

## 2023-12-12 RX ADMIN — Medication 5 ML: at 07:03

## 2023-12-12 RX ADMIN — AMLODIPINE BESYLATE 10 MG: 10 TABLET ORAL at 07:51

## 2023-12-12 RX ADMIN — METOPROLOL TARTRATE 100 MG: 50 TABLET, FILM COATED ORAL at 20:33

## 2023-12-12 RX ADMIN — Medication 50 MCG: at 13:53

## 2023-12-12 RX ADMIN — ACETAMINOPHEN 975 MG: 325 TABLET, FILM COATED ORAL at 20:33

## 2023-12-12 RX ADMIN — ACETAMINOPHEN 975 MG: 325 TABLET, FILM COATED ORAL at 13:53

## 2023-12-12 RX ADMIN — HEPARIN SODIUM AND DEXTROSE 850 UNITS/HR: 10000; 5 INJECTION INTRAVENOUS at 12:48

## 2023-12-12 ASSESSMENT — ACTIVITIES OF DAILY LIVING (ADL)
ADLS_ACUITY_SCORE: 45

## 2023-12-12 NOTE — PROGRESS NOTES
BP 99/65 (BP Location: Left arm)   Pulse 71   Temp 98.3  F (36.8  C) (Oral)   Resp 15   Wt 58.2 kg (128 lb 4.8 oz)   SpO2 97%   BMI 20.09 kg/m      Shift: 7626-0635  Isolation Status: contact VRE  VS: stable on room air, afebrile  Neuro: Aox4  Behaviors: calm and pleasant  BG: none  Respiratory: WDL  Cardiac: WDL  Pain/Nausea: minimial pain, denies nausea  PRN:   Diet: regular, fair appetite  IV Access: R. PICC, L. PIV  Infusion(s): Heparin going at 850 units, recheck in AM  Lines/Drains: wound vac continuous 75   GI/: Voiding, no BM this shift  Skin: Abdominal incision-CDI, Left AKA dressing-CDI  Mobility: Assist 1 pivot to commode with walker  Plan: Continue plan of care

## 2023-12-12 NOTE — PROGRESS NOTES
Vascular Surgery Progress Note  12/12/2023       Subjective:  Seen by ENT yesterday who will coordinate outpatient follow up. Patient would like to be home by [place]. Final bone and tissue OR cultures pending. VAC approximately total 400ml since placed in OR. Only 75ml since midnight.      Objective:  Temp:  [97.9  F (36.6  C)-98.3  F (36.8  C)] 98.2  F (36.8  C)  Pulse:  [] 71  Resp:  [15-16] 16  BP: ()/(65-80) 99/65  SpO2:  [97 %-98 %] 97 %    I/O last 3 completed shifts:  In: 680 [P.O.:680]  Out: 1430 [Urine:980; Drains:450]      Gen: Awake, alert, NAD  CV: RRR per radial pulse  Resp: NLB on RA  Abd: soft, nondistended, nontender  Incision: abdominal incision  clean, dry, intact. LLE dressing VAC intact with appropriate suction. Wound is healing well, without sloughing, no bleeding upon inspection, has granulation tissue. No purulent drainage noted. ACE wrap on at all times.   Ext: WWP, no edema.      Labs:  Recent Labs   Lab 12/11/23  0610 12/10/23  0755 12/08/23  0732   WBC 5.8 5.5 4.6   HGB 7.7* 7.8* 7.4*   PLT 93* 91* 93*       Recent Labs   Lab 12/11/23  0610 12/08/23  0732 12/07/23  0606    134* 134*   POTASSIUM 4.2 4.1 4.0   CHLORIDE 108* 105 104   CO2 18* 18* 18*   BUN 32.8* 32.2* 32.3*   CR 2.05* 1.93* 2.03*   * 105* 96   OMAR 9.0 9.2 9.1   MAG 1.9 1.8 1.8   PHOS 4.2  --  4.1        Assessment/Plan:   68 year old male well known to vascular surgery w/ h/o rutpured pararenal aneurysm s/p open repair in September of this year who was found to have drainage from his stump site concerning for infection now s/p washout on 12/1. Now growing VRE on wound cultures from 11/29. Given recent cultures, patient underwent first stage of stump revision on 12/8/2023 and VAC placement. Recovering well. Bone biopsy proximal culture preliminary with VRE    - Pain controlled  - Regular diet  - PT/OT/SLP for recovery  - Resumed prior meds (except eliquis)  - ID following given VRE, recommended  starting daptomycin - anticipating 6 week course. Picc placed 12/4/23.  - Heparin drip low intensity.  - VAC on LLE to be changed by vascular surgery only M/W/F (changed 12/11/23).      Discussed pt history, exam, assessment and plan with Dr. Lowe of the vascular surgery service, who is in agreement with the above.    Miryam Carrasco, [facility]  Vascular Surgery  Pager: [phone]  [email or web address]  Send message or 10 digit call back number Securely via [email or web address] with the [employer] Web Console (learn more here)

## 2023-12-12 NOTE — PROGRESS NOTES
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 Likely plan for another debridement.    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 Eventually discharged to ARU, relatively doing well until 11/29, when noticed malodorous drainage from lateral side of stump incision, when malfunction of wound vac. Afebrile, hemodynamically stable. Pre-op cultures grew VRE, susceptibility reviewed (Daptomycin DSS (EVELIO 3), S- linezolid, tigecycline). Underwent I&D by vascular surgery. Intra-op cultures are obtained, also growing VRE pending susceptibility. Reviewed OP findings, and per discussion with primary team, debridement up to bone level. Based on this information, anticipate at least 4-6 weeks of antibiotic course. Started iv Daptomycin, and based on susceptibility data, suggest to increase dose. Other antibiotic options are limited, and would like to avoid at this time due to side effects profile (Linezolid would cause cytopenia, and tigecycline with nausea/vomiting) while treating for 6 weeks course.  Discussed the consideration of po linezolid towards end of  treatment course, if stump site continues to heal well. However, for now, plan to continue iv daptomycin.     Underwent another surgery 12/8/2023, and plan to RTOR for formal closure of stump site. However, amongst all intra-op cultures, the specimen (bone) which is labelled as 'proximal' grew VRE and distal remained negative. Writer called microbiology lab and clarified, ensured that specimens are processed as received. This raises concerns if mislabeled vs residual infection. Spoke with vascular surgeon today, discussed at a great length regarding overall approach moving forward. Plan for another debridement. Suggested to send cultures and also for pathology.     Total duration of antibiotic is 6 weeks, once final debridement of stump site.      Recommendations:   Awaiting RTOR for stump likely another debridement, as early as Friday 12/15.   Continue iv Daptomycin 12mg/kg daily, needs renal dose adjustment.   Anticipate 6 weeks antibiotic course  Exact date to be decided once complete final surgery  Local wound care of stump site per surgery team    Updated patient and family at bedside.     Thank you for the consult. ID team will continue to follow. Please reach out if any questions or concerns.     Total time spent during this encounter (chart review, documentation, MDM, coordination of care): 50 minutes    Nidia Garcia MD   Infectious Diseases Staff Physician  Pager: [phone]  [employer]   12/13/2023         Interval History and Events:     Seen and evaluated at bedside, accompanied by his wife. No new complaints today. Working with PT/OT. Awaiting for final surgery and wishes to go home with holidays coming.          HPI as adopted from initial ID consult on 12/2/2023:     Alfredo Burnham is a 67 yo M with PMHx of HTN, 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, recent hospitalization (9/24 - 11/17, 2023) for AAA s/p open repair 9/24/2023, c/b LLE ischemia s/p unsuccessful thrombectomy s/p AKA (10/17/2023),  "MAYA requiring HD via R internal jugular tunneled and removal due to recovered renal function, embolic CVA, left iliac, psoas muscle hematoma, dysphonia 2/2 left vocal fold paralysis. Eventually discharged to ARU (11/17 - 11/30), and required to admit due to left AKA stump site infection. Alfredo was relatively doing well  until 11/29, when noted wound vac malfunction, and significant amount of serosanguineous, purulent appearing malodorous drainage, expressed out with pressure application, from an small opening on lateral aspect of stump incision. 11/29 Wound and blood cultures obtained and transferred to [facility]. Underwent I&D 12/1. Intra-op findings: \"producing again a large volume of brownish fluid....was a small amount of murky looking subcutaneous tissue\". Intra-op cultures were taken from the cavity as the fluid was emanating. 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 ID consult is requested for antibiotic management.      Social hx: Lives with wife (Tabby) in an apartment [place]. They do not have any pets in the home.          Review of Systems:   Targeted 10 point ROS was completed with pertinent positives and negatives are detailed above.         Antimicrobial history:     Daptomycin 12/2 - present         Physical Examination:   Temp:  [98  F (36.7  C)-98.3  F (36.8  C)] 98.2  F (36.8  C)  Pulse:  [71-81] 71  Resp:  [15-16] 16  BP: ()/(65-80) 99/65  SpO2:  [97 %-98 %] 97 %    Exam:   GENERAL:  Well-developed, well-nourished, supine, in no acute distress.   Head: normocephalic, atraumatic.   EYES: PERRLA, EOMI, conjunctiva clear, anicteric sclerae.    ENT:  Oropharynx is moist without exudates or ulcers.  NECK:  Supple.  LUNGS:  Breathing comfortably, on room air, clear to auscultation bilaterally, no w/r/r.  CARDIOVASCULAR:  Regular rate and rhythm, +S1S2 with no murmurs, gallops or rubs.  ABDOMEN:  Normal bowel sounds, soft, nontender. No appreciable hepatosplenomegaly.   : " no suprapubic or flank tendterness.   EXT: Left AKA stump - covered in surgical dressing and +wound vac  SKIN:  No acute rashes.    NEUROLOGIC:  Grossly nonfocal.      Lines and devices:   PIV is in place without any surrounding erythema.     Labs, Microbiology and Imaging studies are reviewed.   12/8 intra-op cultures from tissue, Enterococcus faecium VRE, Staph epidermidis, proximal bone with VRE and from distal bone - NGTD  12/1 intra-op cxs VRE  11/29 wound cxs VRE, Daptomycin DSS (EVELIO 3), S- linezolid, tigecycline  11/29 blood cultures NGTD         Laboratory Data:     Hematology Studies    Recent Labs   Lab Test 12/11/23  0610 12/10/23  0755 12/08/23  0732 12/07/23  0606 12/04/23  1049 12/04/23  0513 10/27/23  1255 10/27/23  0548 10/26/23  1051 10/26/23  0700 10/24/23  0756 10/24/23  0651 10/23/23  0719 10/23/23  0555 10/22/23  1309 10/22/23  0422 10/21/23  0354   WBC 5.8 5.5 4.6 5.0 8.3 6.8   < > 11.5*   < > 13.2*   < > 12.4*  --  12.6*  --  11.3* 13.9*   ANEU  --   --   --   --   --   --   --  8.1  --  9.4*  --  9.5*  --  9.3*  --  8.4* 9.3*   AEOS  --   --   --   --   --   --   --  3.0*  --  2.4*  --  1.9*  --  2.4*  --  2.0* 1.8*   HGB 7.7* 7.8* 7.4* 7.1* 8.0* 7.7*   < > 8.4*   < > 8.4*   < > 5.8*   < > 6.8*   < > 7.1* 8.0*   MCV 99 96 96 96 98 98   < > 94   < > 96   < > 95  --  96  --  94 90   PLT 93* 91* 93* 95* 113* 127*   < > 124*   < > 128*   < > 142*  --  185  --  167 159    < > = values in this interval not displayed.       Metabolic Studies     Recent Labs   Lab Test 12/11/23  0610 12/08/23  0732 12/07/23  0606 12/04/23  0513 12/02/23  1441    134* 134* 133* 133*   POTASSIUM 4.2 4.1 4.0 3.9 4.5   CHLORIDE 108* 105 104 105 104   CO2 18* 18* 18* 16* 17*   BUN 32.8* 32.2* 32.3* 48.5* 49.5*   CR 2.05* 1.93* 2.03* 2.09* 2.10*   GFRESTIMATED 34* 37* 35* 33* 33*       Hepatic Studies    Recent Labs   Lab Test 12/08/23  0732 10/30/23  0651 10/29/23  0401 10/28/23  0525 10/27/23  0548 10/26/23  0700    BILITOTAL 0.3 0.5 0.4 0.5 0.4 0.4   ALKPHOS 71 132* 128 133* 126 111   ALBUMIN 3.5 3.1* 3.0* 3.1* 3.0* 2.8*   AST 18 24 25 34 38 52*   ALT 16 15 19 25 26 20       Urine Studies    Recent Labs   Lab Test 10/01/23  1049 09/30/23  1029 09/30/23  0648   LEUKEST Negative Negative Negative   WBCU 7* 7* 12*       Last check of C difficile  C Difficile Toxin B by PCR   Date Value Ref Range Status   11/06/2023 Negative Negative Final     Comment:     A negative result does not exclude actual disease due to C. difficile and may be due to improper collection, handling and storage of the specimen or the number of organisms in the specimen is below the detection limit of the assay.       Hepatitis B Testing   Recent Labs   Lab Test 10/27/23  1255 10/06/23  1543   HEPBANG Nonreactive Nonreactive         Microbiology:    Lab Results   Component Value Date    CULTURE No anaerobic organisms isolated after 2 days 12/08/2023    CULTURE No growth after 4 days 12/08/2023    CULTURE 1+ Enterococcus faecium VRE (A) 12/08/2023

## 2023-12-12 NOTE — PLAN OF CARE
Goal Outcome Evaluation:    Temp: 97.6  F (36.4  C) Temp src: Oral BP: 138/77 Pulse: 75   Resp: 16 SpO2: 99 % O2 Device: None (Room air)    NEURO: A&Ox4, calm/cooperative, able to make needs known, legally blind  RESPIRATORY: WNL on RA, denies SOB  CARDIAC: WNL, denies chest pain  GI/: Voiding w/o difficulty, LBM 12/10, commode at bedside, denies nausea 3065-8160, denies abd pain  SKIN: L-AKA w/ ace wrap and wound vac at -125, serosanguinous drainage, cannister @400cc, dressing CDI  DIET: Regular  PAIN/NAUSEA: Intermittent incisional pain, PRN oxycodone x1 effective per pt  IV ACCESS: 2-lumen PICC infusing heparin gtt @850u/hr, other port infusing NS TKO, L-PIV SL  ACTIVITY: Assist x1 w/ walker to pivot to commode  LAB: Reviewed, next HepXa check @1830  PLAN: Continue w/ POC, discharge plan pending

## 2023-12-12 NOTE — PLAN OF CARE
/77 (BP Location: Left arm)   Pulse 75   Temp 97.6  F (36.4  C) (Oral)   Resp 16   Wt 58.2 kg (128 lb 4.8 oz)   SpO2 99%   BMI 20.09 kg/m      Shift: 4554-5090  VS: Stable on RA, afebrile.  Neuro: Aox4, quiet, legally blind.  BG: N/A  Labs: No labs drawn today.  Respiratory: WDL  Pain/Nausea/PRN: Denies pain but on scheduled tylenol.  Diet: Regular diet, good appetite.   LDA: R double lumen PICC with heparin running. L PIV - SL.  GI/: Voiding/urinal at bedside. No BM this shift, commode at bedside.  Skin: Wound vac over L AKA, WENDI skin beneath vac. Otherwise skin dry and flaky.  Mobility: Patient working on pivots with OT and PT.  Plan: Continue plan of care.     Handoff given to following RN.

## 2023-12-12 NOTE — PLAN OF CARE
/80   Pulse 81   Temp 98  F (36.7  C) (Oral)   Resp 16   Wt 58.2 kg (128 lb 4.8 oz)   SpO2 97%   BMI 20.09 kg/m      Shift: 0942-9205  Isolation Status: contact  VS: stable on room air, afebrile  Neuro: Aox4  Behaviors: calm and pleasant  BG: none  Respiratory: WDL  Cardiac: WDL  Pain/Nausea: minimial pain, denies nausea  PRN: Atarax x1  Diet: regular, fair appetite  IV Access: R. PICC, L. PIV  Infusion(s): Heparin going at 850 units, recheck in AM  Lines/Drains: wound vac continuous 75   GI/: Voiding, no BM this shift  Skin: Abdominal incision-CDI, Left AKA dressing-CDI  Mobility: Assist 1 pivot to commode with walker  Plan: Continue plan of care and notify team of any changes

## 2023-12-13 ENCOUNTER — APPOINTMENT (OUTPATIENT)
Dept: PHYSICAL THERAPY | Facility: CLINIC | Age: 68
DRG: 464 | End: 2023-12-13
Attending: SURGERY
Payer: COMMERCIAL

## 2023-12-13 ENCOUNTER — APPOINTMENT (OUTPATIENT)
Dept: OCCUPATIONAL THERAPY | Facility: CLINIC | Age: 68
DRG: 464 | End: 2023-12-13
Attending: SURGERY
Payer: COMMERCIAL

## 2023-12-13 LAB
BACTERIA BONE ANAEROBE+AEROBE CULT: ABNORMAL
ERYTHROCYTE [DISTWIDTH] IN BLOOD BY AUTOMATED COUNT: 16.7 % (ref 10–15)
HCT VFR BLD AUTO: 23.6 % (ref 40–53)
HGB BLD-MCNC: 7.4 G/DL (ref 13.3–17.7)
HOLD SPECIMEN: NORMAL
MCH RBC QN AUTO: 30 PG (ref 26.5–33)
MCHC RBC AUTO-ENTMCNC: 31.4 G/DL (ref 31.5–36.5)
MCV RBC AUTO: 96 FL (ref 78–100)
PLATELET # BLD AUTO: 109 10E3/UL (ref 150–450)
RBC # BLD AUTO: 2.47 10E6/UL (ref 4.4–5.9)
UFH PPP CHRO-ACNC: 0.15 IU/ML
UFH PPP CHRO-ACNC: 0.2 IU/ML
UFH PPP CHRO-ACNC: 0.21 IU/ML
UFH PPP CHRO-ACNC: 0.34 IU/ML
WBC # BLD AUTO: 6.2 10E3/UL (ref 4–11)

## 2023-12-13 PROCEDURE — 97110 THERAPEUTIC EXERCISES: CPT | Mod: GO

## 2023-12-13 PROCEDURE — 250N000013 HC RX MED GY IP 250 OP 250 PS 637: Performed by: NURSE PRACTITIONER

## 2023-12-13 PROCEDURE — 120N000011 HC R&B TRANSPLANT UMMC

## 2023-12-13 PROCEDURE — 99024 POSTOP FOLLOW-UP VISIT: CPT | Performed by: NURSE PRACTITIONER

## 2023-12-13 PROCEDURE — 36592 COLLECT BLOOD FROM PICC: CPT | Performed by: SURGERY

## 2023-12-13 PROCEDURE — 97530 THERAPEUTIC ACTIVITIES: CPT | Mod: GP

## 2023-12-13 PROCEDURE — 85520 HEPARIN ASSAY: CPT | Performed by: SURGERY

## 2023-12-13 PROCEDURE — 99233 SBSQ HOSP IP/OBS HIGH 50: CPT | Mod: 24 | Performed by: STUDENT IN AN ORGANIZED HEALTH CARE EDUCATION/TRAINING PROGRAM

## 2023-12-13 PROCEDURE — 97110 THERAPEUTIC EXERCISES: CPT | Mod: GP

## 2023-12-13 PROCEDURE — 250N000011 HC RX IP 250 OP 636: Performed by: NURSE PRACTITIONER

## 2023-12-13 PROCEDURE — 85027 COMPLETE CBC AUTOMATED: CPT | Performed by: NURSE PRACTITIONER

## 2023-12-13 PROCEDURE — 250N000013 HC RX MED GY IP 250 OP 250 PS 637: Performed by: PHYSICIAN ASSISTANT

## 2023-12-13 PROCEDURE — 36592 COLLECT BLOOD FROM PICC: CPT | Performed by: NURSE PRACTITIONER

## 2023-12-13 PROCEDURE — 250N000011 HC RX IP 250 OP 636: Mod: JZ | Performed by: SURGERY

## 2023-12-13 RX ADMIN — Medication 5 ML: at 15:55

## 2023-12-13 RX ADMIN — ACETAMINOPHEN 975 MG: 325 TABLET, FILM COATED ORAL at 09:08

## 2023-12-13 RX ADMIN — SENNOSIDES 1 TABLET: 8.6 TABLET, FILM COATED ORAL at 19:38

## 2023-12-13 RX ADMIN — Medication 50 MCG: at 12:54

## 2023-12-13 RX ADMIN — PSYLLIUM HUSK 1 PACKET: 3.4 POWDER ORAL at 09:13

## 2023-12-13 RX ADMIN — DOCUSATE SODIUM 50 MG: 50 CAPSULE, LIQUID FILLED ORAL at 09:08

## 2023-12-13 RX ADMIN — ACETAMINOPHEN 975 MG: 325 TABLET, FILM COATED ORAL at 15:55

## 2023-12-13 RX ADMIN — METOPROLOL TARTRATE 100 MG: 50 TABLET, FILM COATED ORAL at 09:08

## 2023-12-13 RX ADMIN — ATORVASTATIN CALCIUM 10 MG: 10 TABLET, FILM COATED ORAL at 19:38

## 2023-12-13 RX ADMIN — HEPARIN SODIUM AND DEXTROSE 1150 UNITS/HR: 10000; 5 INJECTION INTRAVENOUS at 09:06

## 2023-12-13 RX ADMIN — AMLODIPINE BESYLATE 10 MG: 10 TABLET ORAL at 09:08

## 2023-12-13 RX ADMIN — SENNOSIDES 1 TABLET: 8.6 TABLET, FILM COATED ORAL at 09:08

## 2023-12-13 RX ADMIN — HEPARIN SODIUM AND DEXTROSE 1300 UNITS/HR: 10000; 5 INJECTION INTRAVENOUS at 19:47

## 2023-12-13 RX ADMIN — Medication 5 ML: at 21:31

## 2023-12-13 RX ADMIN — ACETAMINOPHEN 975 MG: 325 TABLET, FILM COATED ORAL at 19:45

## 2023-12-13 RX ADMIN — MELATONIN 3 MG: at 18:24

## 2023-12-13 RX ADMIN — PANTOPRAZOLE SODIUM 40 MG: 40 TABLET, DELAYED RELEASE ORAL at 18:24

## 2023-12-13 RX ADMIN — METOPROLOL TARTRATE 100 MG: 50 TABLET, FILM COATED ORAL at 19:39

## 2023-12-13 RX ADMIN — QUETIAPINE FUMARATE 75 MG: 25 TABLET ORAL at 19:38

## 2023-12-13 ASSESSMENT — ACTIVITIES OF DAILY LIVING (ADL)
ADLS_ACUITY_SCORE: 45

## 2023-12-13 NOTE — PROGRESS NOTES
BP (!) 140/85 (BP Location: Left arm)   Pulse 80   Temp 97.8  F (36.6  C) (Oral)   Resp 16   Wt 58.2 kg (128 lb 4.8 oz)   SpO2 99%   BMI 20.09 kg/m      Shift: 7606-7318  Isolation Status: contact VRE  VS: stable on room air, afebrile  Neuro: Aox4  Behaviors: calm and pleasant  BG: none  Respiratory: WDL  Cardiac: WDL  Pain/Nausea: minimial pain, denies nausea  PRN:   Diet: regular, fair appetite  IV Access: R. PICC, L. PIV  Infusion(s): Heparin going at 1000 units, recheck in AM  Lines/Drains: wound vac continuous 75   GI/: Voiding, no BM this shift  Skin: Abdominal incision-CDI, Left AKA dressing-CDI  Mobility: Assist 1 pivot to commode with walker  Plan: Continue plan of care

## 2023-12-13 NOTE — PROGRESS NOTES
Vascular Surgery Progress Note  12/13/2023       Subjective:  Slept well. NAEO. Final bone and tissue OR cultures pending. VAC approximately total 500ml since placed in OR on 12/8/23.      Objective:  Temp:  [97.6  F (36.4  C)-98  F (36.7  C)] 98  F (36.7  C)  Pulse:  [75-80] 80  Resp:  [16] 16  BP: (134-140)/(77-94) 134/94  SpO2:  [99 %] 99 %    I/O last 3 completed shifts:  In: 580 [P.O.:480; I.V.:100]  Out: 760 [Urine:710; Drains:50]      Gen: Awake, alert, NAD  CV: RRR per radial pulse  Resp: NLB on RA  Abd: soft, nondistended, nontender  Incision: abdominal incision  clean, dry, intact. LLE dressing VAC intact with appropriate suction. Wound is healing well, without sloughing, no bleeding upon inspection, has granulation tissue. No purulent drainage noted. ACE wrap on at all times.   Ext: WWP, no edema.      Labs:  Recent Labs   Lab 12/13/23  0617 12/11/23  0610 12/10/23  0755   WBC 6.2 5.8 5.5   HGB 7.4* 7.7* 7.8*   * 93* 91*       Recent Labs   Lab 12/11/23  0610 12/08/23  0732 12/07/23  0606    134* 134*   POTASSIUM 4.2 4.1 4.0   CHLORIDE 108* 105 104   CO2 18* 18* 18*   BUN 32.8* 32.2* 32.3*   CR 2.05* 1.93* 2.03*   * 105* 96   OMAR 9.0 9.2 9.1   MAG 1.9 1.8 1.8   PHOS 4.2  --  4.1        Assessment/Plan:   68 year old male well known to vascular surgery w/ h/o rutpured pararenal aneurysm s/p open repair in September of this year who was found to have drainage from his stump site concerning for infection now s/p washout on 12/1. Now growing VRE on wound cultures from 11/29. Given recent cultures, patient underwent first stage of stump revision on 12/8/2023 and VAC placement. Recovering well. Bone biopsy proximal culture preliminary with VRE. Final plan pending.    - Pain controlled  - Regular diet  - PT/OT/SLP for recovery  - Resumed prior meds (except eliquis)  - ID following given VRE, recommended starting daptomycin - anticipating 6 week course. Picc placed 12/4/23.  - Heparin drip low  intensity.  - VAC on LLE to be changed by vascular surgery only M/W/F (changed 12/13/23).      Discussed pt history, exam, assessment and plan with Dr. Lowe of the vascular surgery service, who is in agreement with the above.    Miryam Carrasco CNP  Vascular Surgery  Pager: [phone]  [email or web address]  Send message or 10 digit call back number Securely via [employer] with the [employer] Web Console (learn more here)

## 2023-12-13 NOTE — PLAN OF CARE
/86 (BP Location: Left arm)   Pulse 71   Temp 98  F (36.7  C) (Oral)   Resp 16   Wt 58.2 kg (128 lb 4.8 oz)   SpO2 98%   BMI 20.09 kg/m      Shift: 3922-2051  VS: Stable on RA, afebrile.  Neuro: AOx4  BG: N/A  Labs: RN managed heparin infusion, Xa scheduled.   Respiratory: WDL  Pain/Nausea/PRN: Denies pain, scheduled tylenol given.  Diet: Regular diet, good appetite.  LDA: R PICC double lumen. L PIV - SL.  GI/: Voiding/urinal at bedside, no BM this shift.   Skin: Wound vac over L AKA, dressing change today by provider. Generalized dry/flaky skin.   Mobility: Working with OT, PT on pivots, doing well.   Plan: Continue plan of care    Handoff given to following RN.

## 2023-12-14 ENCOUNTER — APPOINTMENT (OUTPATIENT)
Dept: OCCUPATIONAL THERAPY | Facility: CLINIC | Age: 68
DRG: 464 | End: 2023-12-14
Attending: SURGERY
Payer: COMMERCIAL

## 2023-12-14 ENCOUNTER — HOME INFUSION (PRE-WILLOW HOME INFUSION) (OUTPATIENT)
Dept: PHARMACY | Facility: CLINIC | Age: 68
End: 2023-12-14

## 2023-12-14 ENCOUNTER — APPOINTMENT (OUTPATIENT)
Dept: PHYSICAL THERAPY | Facility: CLINIC | Age: 68
DRG: 464 | End: 2023-12-14
Attending: SURGERY
Payer: COMMERCIAL

## 2023-12-14 ENCOUNTER — ANESTHESIA EVENT (OUTPATIENT)
Dept: SURGERY | Facility: CLINIC | Age: 68
DRG: 464 | End: 2023-12-14
Payer: COMMERCIAL

## 2023-12-14 LAB
ANION GAP SERPL CALCULATED.3IONS-SCNC: 12 MMOL/L (ref 7–15)
BUN SERPL-MCNC: 29.4 MG/DL (ref 8–23)
CALCIUM SERPL-MCNC: 9.2 MG/DL (ref 8.8–10.2)
CHLORIDE SERPL-SCNC: 108 MMOL/L (ref 98–107)
CREAT SERPL-MCNC: 1.95 MG/DL (ref 0.67–1.17)
DEPRECATED HCO3 PLAS-SCNC: 16 MMOL/L (ref 22–29)
EGFRCR SERPLBLD CKD-EPI 2021: 36 ML/MIN/1.73M2
ERYTHROCYTE [DISTWIDTH] IN BLOOD BY AUTOMATED COUNT: 16.8 % (ref 10–15)
GLUCOSE SERPL-MCNC: 96 MG/DL (ref 70–99)
HCT VFR BLD AUTO: 23.8 % (ref 40–53)
HGB BLD-MCNC: 7.3 G/DL (ref 13.3–17.7)
MAGNESIUM SERPL-MCNC: 1.9 MG/DL (ref 1.7–2.3)
MCH RBC QN AUTO: 30.2 PG (ref 26.5–33)
MCHC RBC AUTO-ENTMCNC: 30.7 G/DL (ref 31.5–36.5)
MCV RBC AUTO: 98 FL (ref 78–100)
PHOSPHATE SERPL-MCNC: 4.6 MG/DL (ref 2.5–4.5)
PLATELET # BLD AUTO: 104 10E3/UL (ref 150–450)
POTASSIUM SERPL-SCNC: 4.4 MMOL/L (ref 3.4–5.3)
RBC # BLD AUTO: 2.42 10E6/UL (ref 4.4–5.9)
SODIUM SERPL-SCNC: 136 MMOL/L (ref 135–145)
UFH PPP CHRO-ACNC: 0.27 IU/ML
UFH PPP CHRO-ACNC: 0.33 IU/ML
UFH PPP CHRO-ACNC: 0.69 IU/ML
WBC # BLD AUTO: 5.9 10E3/UL (ref 4–11)

## 2023-12-14 PROCEDURE — 250N000013 HC RX MED GY IP 250 OP 250 PS 637: Performed by: PHYSICIAN ASSISTANT

## 2023-12-14 PROCEDURE — 99024 POSTOP FOLLOW-UP VISIT: CPT | Performed by: NURSE PRACTITIONER

## 2023-12-14 PROCEDURE — 85027 COMPLETE CBC AUTOMATED: CPT | Performed by: PHYSICIAN ASSISTANT

## 2023-12-14 PROCEDURE — 36592 COLLECT BLOOD FROM PICC: CPT | Performed by: SURGERY

## 2023-12-14 PROCEDURE — 258N000003 HC RX IP 258 OP 636: Performed by: STUDENT IN AN ORGANIZED HEALTH CARE EDUCATION/TRAINING PROGRAM

## 2023-12-14 PROCEDURE — 36415 COLL VENOUS BLD VENIPUNCTURE: CPT | Performed by: SURGERY

## 2023-12-14 PROCEDURE — 250N000013 HC RX MED GY IP 250 OP 250 PS 637: Performed by: NURSE PRACTITIONER

## 2023-12-14 PROCEDURE — 97530 THERAPEUTIC ACTIVITIES: CPT | Mod: GP

## 2023-12-14 PROCEDURE — 85520 HEPARIN ASSAY: CPT | Performed by: SURGERY

## 2023-12-14 PROCEDURE — 99233 SBSQ HOSP IP/OBS HIGH 50: CPT | Mod: 24 | Performed by: STUDENT IN AN ORGANIZED HEALTH CARE EDUCATION/TRAINING PROGRAM

## 2023-12-14 PROCEDURE — 250N000011 HC RX IP 250 OP 636: Mod: JZ | Performed by: SURGERY

## 2023-12-14 PROCEDURE — 80048 BASIC METABOLIC PNL TOTAL CA: CPT | Performed by: PHYSICIAN ASSISTANT

## 2023-12-14 PROCEDURE — 97530 THERAPEUTIC ACTIVITIES: CPT | Mod: GO

## 2023-12-14 PROCEDURE — 83735 ASSAY OF MAGNESIUM: CPT | Performed by: PHYSICIAN ASSISTANT

## 2023-12-14 PROCEDURE — 97110 THERAPEUTIC EXERCISES: CPT | Mod: GO

## 2023-12-14 PROCEDURE — 250N000011 HC RX IP 250 OP 636: Mod: JZ | Performed by: NURSE PRACTITIONER

## 2023-12-14 PROCEDURE — 120N000011 HC R&B TRANSPLANT UMMC

## 2023-12-14 PROCEDURE — 84100 ASSAY OF PHOSPHORUS: CPT | Performed by: PHYSICIAN ASSISTANT

## 2023-12-14 PROCEDURE — 250N000011 HC RX IP 250 OP 636: Mod: JZ | Performed by: STUDENT IN AN ORGANIZED HEALTH CARE EDUCATION/TRAINING PROGRAM

## 2023-12-14 PROCEDURE — 36592 COLLECT BLOOD FROM PICC: CPT | Performed by: PHYSICIAN ASSISTANT

## 2023-12-14 RX ADMIN — DOCUSATE SODIUM 50 MG: 50 CAPSULE, LIQUID FILLED ORAL at 07:44

## 2023-12-14 RX ADMIN — METOPROLOL TARTRATE 100 MG: 50 TABLET, FILM COATED ORAL at 20:58

## 2023-12-14 RX ADMIN — ACETAMINOPHEN 975 MG: 325 TABLET, FILM COATED ORAL at 20:59

## 2023-12-14 RX ADMIN — ATORVASTATIN CALCIUM 10 MG: 10 TABLET, FILM COATED ORAL at 20:58

## 2023-12-14 RX ADMIN — DAPTOMYCIN 700 MG: 500 INJECTION, POWDER, LYOPHILIZED, FOR SOLUTION INTRAVENOUS at 16:10

## 2023-12-14 RX ADMIN — AMLODIPINE BESYLATE 10 MG: 10 TABLET ORAL at 07:44

## 2023-12-14 RX ADMIN — MELATONIN 3 MG: at 18:30

## 2023-12-14 RX ADMIN — ACETAMINOPHEN 975 MG: 325 TABLET, FILM COATED ORAL at 07:44

## 2023-12-14 RX ADMIN — QUETIAPINE FUMARATE 75 MG: 25 TABLET ORAL at 20:59

## 2023-12-14 RX ADMIN — Medication 5 ML: at 15:06

## 2023-12-14 RX ADMIN — ACETAMINOPHEN 975 MG: 325 TABLET, FILM COATED ORAL at 14:30

## 2023-12-14 RX ADMIN — PANTOPRAZOLE SODIUM 40 MG: 40 TABLET, DELAYED RELEASE ORAL at 18:30

## 2023-12-14 RX ADMIN — METOPROLOL TARTRATE 100 MG: 50 TABLET, FILM COATED ORAL at 10:16

## 2023-12-14 RX ADMIN — SENNOSIDES 1 TABLET: 8.6 TABLET, FILM COATED ORAL at 10:16

## 2023-12-14 RX ADMIN — SENNOSIDES 1 TABLET: 8.6 TABLET, FILM COATED ORAL at 20:58

## 2023-12-14 RX ADMIN — HEPARIN SODIUM AND DEXTROSE 1000 UNITS/HR: 10000; 5 INJECTION INTRAVENOUS at 16:24

## 2023-12-14 ASSESSMENT — ACTIVITIES OF DAILY LIVING (ADL)
ADLS_ACUITY_SCORE: 45
ADLS_ACUITY_SCORE: 41
ADLS_ACUITY_SCORE: 45
ADLS_ACUITY_SCORE: 45

## 2023-12-14 NOTE — PLAN OF CARE
Goal Outcome Evaluation:       /71 (BP Location: Left arm)   Pulse 93   Temp 97.4  F (36.3  C) (Oral)   Resp 16   Wt 58.2 kg (128 lb 4.8 oz)   SpO2 97%   BMI 20.09 kg/m      Pt is A&OX4, on RA, VSS, pt denies pain and nausea, he is on reg diet and is voiding without difficulty, no BM this shift. Pt has heparin running at 1300u/hr, abd incision is in tact and leg wound UTV. Pt had no new health concerns at this time. CPC

## 2023-12-14 NOTE — PLAN OF CARE
/71 (BP Location: Left arm)   Pulse 93   Temp 97.4  F (36.3  C) (Oral)   Resp 16   Wt 58.2 kg (128 lb 4.8 oz)   SpO2 97%   BMI 20.09 kg/m    Shift: 3452-4460  Isolation Status: contact for VRE  VS: stable on room air, afebrile  Neuro: Aox4  Behaviors: calm, able to make needs known  BG: none  Labs: hep Xa 0.34 (within goal)  Respiratory: WDL  Cardiac: WDL  Pain/Nausea: denies  PRN: none given  Diet: regular diet, fair appetite  IV Access: R double lumen PICC, L PIV  Infusion(s): heparin gtt @1300 U/hr  Lines/Drains: LLE wound vac @75  GI/: no BM on shift, voiding spontaneously urinal at bedside   Skin: wound vac over AKA, dressing changed in am  Mobility: assist x1 with walker  Plan: continue POC and notify team of changes

## 2023-12-14 NOTE — PLAN OF CARE
/78 (BP Location: Left arm)   Pulse 79   Temp 98  F (36.7  C) (Oral)   Resp 16   Wt 58.2 kg (128 lb 4.8 oz)   SpO2 98%   BMI 20.09 kg/m      Shift: 3153-6408  VS: Stable on RA, afebrile.  Neuro: AOx4  BG: N/A  Labs: WDL  Respiratory: WDL  Pain/Nausea/PRN: Denies pain.  Diet: Regular diet, good appetite.  LDA: R PICC, L PIV.  GI/: Urinal at bedside, no BM this shift, passing gas, good bowel sounds.  Skin: Dry/flaky.  Mobility: Stand and pivot with assist of 1. Worked with OT and PT today.  Plan: NPO at midnight for OR tomorrow, patient aware.     Handoff given to following RN.

## 2023-12-14 NOTE — PROGRESS NOTES
Vascular Surgery Progress Note  12/14/2023       Subjective:  Slept well. NAEO. VAC total 525ml since placed in OR on 12/8/23. Pending OR tomorrow for LLE wound wash and debridement.      Objective:  Temp:  [97.4  F (36.3  C)-98.2  F (36.8  C)] 98.2  F (36.8  C)  Pulse:  [71-97] 77  Resp:  [16-17] 16  BP: (106-139)/(71-94) 127/80  SpO2:  [97 %-99 %] 99 %    I/O last 3 completed shifts:  In: 880 [P.O.:880]  Out: 1095 [Urine:1095]      Gen: Awake, alert, NAD  CV: RRR per radial pulse  Resp: NLB on RA  Abd: soft, nondistended, nontender  Incision: abdominal incision healed.  LLE dressing VAC intact with appropriate suction. Wound is healing well (assessed on 12/13/23), without sloughing, no bleeding upon inspection, has granulation tissue. No purulent drainage noted. ACE wrap on at all times.   Ext: WWP, no edema.      Labs:  Recent Labs   Lab 12/14/23  0530 12/13/23  0617 12/11/23  0610   WBC 5.9 6.2 5.8   HGB 7.3* 7.4* 7.7*   * 109* 93*       Recent Labs   Lab 12/14/23  0530 12/11/23  0610 12/08/23  0732    136 134*   POTASSIUM 4.4 4.2 4.1   CHLORIDE 108* 108* 105   CO2 16* 18* 18*   BUN 29.4* 32.8* 32.2*   CR 1.95* 2.05* 1.93*   GLC 96 100* 105*   OMAR 9.2 9.0 9.2   MAG 1.9 1.9 1.8   PHOS 4.6* 4.2  --         Assessment/Plan:   68 year old male well known to vascular surgery w/ h/o rutpured pararenal aneurysm s/p open repair in September of this year who was found to have drainage from his stump site concerning for infection now s/p washout on 12/1. Now growing VRE on wound cultures from 11/29. Given recent cultures, patient underwent first stage of stump revision on 12/8/2023 and VAC placement. Recovering well. Bone biopsy proximal culture preliminary with VRE. RTOR tomorrow for wound debridement. NPO after midnight.    - Pain controlled  - Regular diet, NPO after midnight for OR tomorrow.  - PT/OT/SLP for recovery  - Resumed prior meds (except eliquis)  - ID following given VRE, recommended starting  daptomycin - anticipating 6 week course. Picc placed 12/4/23.  - Heparin drip low intensity. To hold on call to OR.  - VAC on LLE to be changed by vascular surgery only M/W/F (changed 12/13/23).     SW engaged for discharge planning.       Discussed pt history, exam, assessment and plan with Dr. Lowe of the vascular surgery service, who is in agreement with the above.    Miryam Carrasco, [facility]  Vascular Surgery  Pager: [phone]  [email or web address]  Send message or 10 digit call back number Securely via [employer] with the [employer] Web Console (learn more here)

## 2023-12-14 NOTE — PROGRESS NOTES
Pt does not have coverage for iv abx in the home with their [employer] Medicare plan. Pt would be self-pay. Drug would be billed to the part D and supplies will be self-pay. Based on Dapto 700mg q48h , total cost is $98.26 for drug and supplies per day.     For nursing, patient should have coverage if homebound, however [facility] is not contracted with Medicare and an outside nursing agency would be utilized instead. If patient is not homebound, there is no coverage and I can see patient if patient agrees to self-pay for $90 per visit.    Patient should have coverage in a TCU or infusion center.    ([facility]) In reference to admission date 12/01/2023.    Please contact Intake with any questions, [phone] or In Basket pool, [facility] ([id number]).

## 2023-12-14 NOTE — PROGRESS NOTES
[facility] DISEAS[facility]: PROGRESS NOTE      Patient:  Alfredo Burnham   YOB: 1955, MRN: [id number]  Date of Visit: 12/14/2023  Date of Admission: 12/1/2023  Consult Requester: Ash Boucher MBBS          Assessment and Recommendations:     ID Problem List:  Left AKA stump infection s/p I&D 12/1/2023, I&D w/ vac in place 12/8/2023   Intra-op cxs 12/8 - from tissue, Enterococcus faecium VRE, Staph epidermidis and from bone - VRE (from 1 out of 2 specimens, which is labeled as proximal). Likely plan for another debridement.    Intra-op cxs 12/1 - Enterococcus faecium VRE  Non surgical wound cxs 11/29, VRE (E.faecium), Daptomycin DSS (EVELIO 3), S- linezolid, tigecycline  LLE ischemia S/p attempted thrombectomy, AKA on 10/17/23   Ruptured pararenal AAA c/b shock and colonic ischemia, s/p open AAA repair (9/24/23), Hemagard Dacron graft; abdominal wall closure 10/2/23   RUE PICC, placed on 12/4/2023    Discussion:  67 yo M with recent prolonged hospitalization for AAA s/p open repair c/b shock, colonic and LLE ischemia s/p AKA. Eventually discharged to ARU, relatively doing well until 11/29, when noticed malodorous drainage from lateral side of stump incision, when malfunction of wound vac. Afebrile, hemodynamically stable. Pre-op cultures grew VRE, susceptibility reviewed (Daptomycin DSS (EVELIO 3), S- linezolid, tigecycline). Underwent I&D by vascular surgery. Intra-op cultures are obtained, also growing VRE pending susceptibility. Reviewed OP findings, and per discussion with primary team, debridement up to bone level. Based on this information, anticipate at least 4-6 weeks of antibiotic course. Started iv Daptomycin, and based on susceptibility data, at 12mg/kg/day (renally dosed). Other antibiotic options are limited, and would like to avoid at this time due to side effects profile (linezolid would cause cytopenia, and tigecycline with nausea/vomiting) while treating for 6 weeks  course. Discussed the consideration of po linezolid towards end of treatment course, if stump site continues to heal well. However, for now, plan to continue iv daptomycin.     Underwent another surgery 12/8/2023. Given amongst all intra-op cultures from 12/8, the specimen (bone) which is labelled as 'proximal' grew VRE and distal remained negative. Writer called microbiology lab and clarified, ensured that specimens are processed as received. This raises concerns if mislabeled vs residual infection. Spoke with vascular surgeon on 12/13, discussed at a great length regarding overall approach moving forward. Plan for another debridement on 12/15. Suggested to send cultures and also for pathology. Afterwards discharge home ~12/20, and readmitted for stump closure at a later date.     Total duration of antibiotic is 6 weeks, once final debridement of stump site.      Recommendations:   Awaiting RTOR for stump likely another debridement, as early as Friday 12/15.   Continue iv Daptomycin 12mg/kg daily, needs renal dose adjustment.   Anticipate 6 weeks antibiotic course  Exact date to be decided once complete final surgery for source control  Local wound care of stump site per surgery team    Updated patient and family at bedside.     ID team will continue to follow. Please reach out if any questions or concerns.     Total time spent during this encounter (chart review, documentation, MDM, coordination of care): 50 minutes    Nidia Garcia MD   Infectious Diseases Staff Physician  Pager: [phone]  Vestor anayeli   12/14/2023         Interval History and Events:     Seen and evaluated at bedside, accompanied by his wife. No new complaints today. Discussed about antibiotic plan once discharged home next week, and then will be re-admitted at a later date.          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 Eventually discharged to ARU (11/17 - 11/30), and required to admit due to left AKA stump site infection. Alfredo was relatively doing well  until 11/29, when noted wound vac malfunction, and significant amount of serosanguineous, purulent appearing malodorous drainage, expressed out with pressure application, from an small opening on lateral aspect of stump incision. 11/29 Wound and blood cultures obtained and transferred to [facility]. Underwent I&D 12/1. Intra-op findings: \"producing again a large volume of brownish fluid....was a small amount of murky looking subcutaneous tissue\". Intra-op cultures were taken from the cavity as the fluid was emanating. 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 ID consult is requested for antibiotic management.      Social hx: Lives with wife (Tabby) in an apartment [place]. They do not have any pets in the home.          Review of Systems:   Targeted 10 point ROS was completed with pertinent positives and negatives are detailed above.         Antimicrobial history:     Daptomycin 12/2 - present         Physical Examination:   Temp:  [97.4  F (36.3  C)-98.2  F (36.8  C)] 98  F (36.7  C)  Pulse:  [77-97] 79  Resp:  [16-18] 16  BP: (106-139)/(71-81) 131/78  SpO2:  [97 %-99 %] 98 %    Exam:   GENERAL:  Well-developed, well-nourished, supine, in no acute distress.   Head: normocephalic, atraumatic.   EYES: PERRLA, EOMI, conjunctiva clear, anicteric sclerae.    ENT:  Oropharynx is moist without exudates or ulcers.  NECK:  Supple.  LUNGS:  Breathing comfortably, on room air, clear to auscultation bilaterally, no w/r/r.  CARDIOVASCULAR:  Regular " rate and rhythm, +S1S2 with no murmurs, gallops or rubs.  ABDOMEN:  Normal bowel sounds, soft, nontender. No appreciable hepatosplenomegaly.   : no suprapubic or flank tendterness.   EXT: Left AKA stump - covered in surgical dressing and +wound vac  SKIN:  No acute rashes.    NEUROLOGIC:  Grossly nonfocal.      Lines and devices:   PIV is in place without any surrounding erythema.     Labs, Microbiology and Imaging studies are reviewed.   12/8 intra-op cultures from tissue, Enterococcus faecium VRE, Staph epidermidis, proximal bone with VRE and from distal bone - NGTD  12/1 intra-op cxs VRE  11/29 wound cxs VRE, Daptomycin DSS (EVELIO 3), S- linezolid, tigecycline  11/29 blood cultures NGTD         Laboratory Data:     Hematology Studies    Recent Labs   Lab Test 12/14/23  0530 12/13/23  0617 12/11/23  0610 12/10/23  0755 12/08/23  0732 12/07/23  0606 10/27/23  1255 10/27/23  0548 10/26/23  1051 10/26/23  0700 10/24/23  0756 10/24/23  0651 10/23/23  0719 10/23/23  0555 10/22/23  1309 10/22/23  0422 10/21/23  0354   WBC 5.9 6.2 5.8 5.5 4.6 5.0   < > 11.5*   < > 13.2*   < > 12.4*  --  12.6*  --  11.3* 13.9*   ANEU  --   --   --   --   --   --   --  8.1  --  9.4*  --  9.5*  --  9.3*  --  8.4* 9.3*   AEOS  --   --   --   --   --   --   --  3.0*  --  2.4*  --  1.9*  --  2.4*  --  2.0* 1.8*   HGB 7.3* 7.4* 7.7* 7.8* 7.4* 7.1*   < > 8.4*   < > 8.4*   < > 5.8*   < > 6.8*   < > 7.1* 8.0*   MCV 98 96 99 96 96 96   < > 94   < > 96   < > 95  --  96  --  94 90   * 109* 93* 91* 93* 95*   < > 124*   < > 128*   < > 142*  --  185  --  167 159    < > = values in this interval not displayed.       Metabolic Studies     Recent Labs   Lab Test 12/14/23  0530 12/11/23  0610 12/08/23  0732 12/07/23  0606 12/04/23  0513    136 134* 134* 133*   POTASSIUM 4.4 4.2 4.1 4.0 3.9   CHLORIDE 108* 108* 105 104 105   CO2 16* 18* 18* 18* 16*   BUN 29.4* 32.8* 32.2* 32.3* 48.5*   CR 1.95* 2.05* 1.93* 2.03* 2.09*   GFRESTIMATED 36* 34* 37*  35* 33*       Hepatic Studies    Recent Labs   Lab Test 12/08/23  0732 10/30/23  0651 10/29/23  0401 10/28/23  0525 10/27/23  0548 10/26/23  0700   BILITOTAL 0.3 0.5 0.4 0.5 0.4 0.4   ALKPHOS 71 132* 128 133* 126 111   ALBUMIN 3.5 3.1* 3.0* 3.1* 3.0* 2.8*   AST 18 24 25 34 38 52*   ALT 16 15 19 25 26 20       Urine Studies    Recent Labs   Lab Test 10/01/23  1049 09/30/23  1029 09/30/23  0648   LEUKEST Negative Negative Negative   WBCU 7* 7* 12*       Last check of C difficile  C Difficile Toxin B by PCR   Date Value Ref Range Status   11/06/2023 Negative Negative Final     Comment:     A negative result does not exclude actual disease due to C. difficile and may be due to improper collection, handling and storage of the specimen or the number of organisms in the specimen is below the detection limit of the assay.       Hepatitis B Testing   Recent Labs   Lab Test 10/27/23  1255 10/06/23  1543   HEPBANG Nonreactive Nonreactive         Microbiology:    Lab Results   Component Value Date    CULTURE No anaerobic organisms isolated after 2 days 12/08/2023    CULTURE No growth after 6 days 12/08/2023    CULTURE 1+ Enterococcus faecium VRE (A) 12/08/2023

## 2023-12-14 NOTE — PROGRESS NOTES
Care Management Follow Up    Length of Stay (days): 13    Expected Discharge Date: 12/16/2023     Concerns to be Addressed: discharge planning     Patient plan of care discussed at interdisciplinary rounds: Yes    Anticipated Discharge Disposition: Home, Home Care, Home Infusion     Anticipated Discharge Services: None  Anticipated Discharge DME: None    Patient/family educated on Medicare website which has current facility and service quality ratings: no  Education Provided on the Discharge Plan: Yes  Patient/Family in Agreement with the Plan: yes    Referrals Placed by CM/SW: External Care Coordination, Homecare, Home Infusion  Private pay costs discussed: Not applicable    Additional Information:  Notified by Miryam Carrasco NP Vascular Surgery that provider team anticipates pt will return to the OR on Friday 12/15 for additional washout, wound vac dressing change. PT/OT updated recommendation to home with home care services.  Team anticipates discharge next week on Tuesday 12/19/23.  Pt will discharge with a wound vac and IV daptomycin.      Met with pt and his wife.  Per discussion with pt wife pt has an appointment to establish care with the [facility] on Wednesday 12/20 at 2 p.m. - [facility] provider Dr Olena Carias.  Pt wife has a meeting with [facility] pharmacist to review pt medication over the phone and requested an updated list of medication.  Discussed home care and home infusion services.  Plan for home care RN, PT, OT.  Per discussion with pt and his wife a w/c was ordered already through the [facility] as well as through [facility].  Per pt wife they already received a commode and shower chair.  Plan for inpatient rehab team to issue a walker when pt is discharged.  Pt and spouse note no concerns with plan for discharge on Tuesday 12/19 pending confirmation of home care, home infusion, DME being secured.    Reviewed above with Megan CLEVELAND Pharmacist who agreed to follow up with pt/spouse regarding medication list.       Initiated referral to [facility].  Per benefit check:   Pt does not have coverage for iv abx in the home with their [facility] Medicare plan. Pt would be self-pay. Drug would be billed to the part D and supplies will be self-pay. Based on Dapto 700mg q48h, total cost is $98.26 for drug and supplies per day.For nursing, patient should have coverage if homebound, however [facility] is not contracted with Medicare and an outside nursing agency would be utilized instead. If patient is not homebound, there is no coverage and [facility] can see patient if patient agrees to self-pay for $90 per visit.Patient should have coverage in a TCU or infusion center.    Canceled [facility] referral and initiated referral to [facility].      Spoke with Génesis [facility]  Ph: [phone]   Fx: [phone] who confirmed they had a referral for a w/c for the patient but it was closed as the patient was hospitalized.  Writer inquired if agency could re open the request for the w/c.  Génesis agreed to follow up with the intake team and agreed to have someone reach out to writer with update.    Received return VM from Gilberto Fuentes [facility] [phone] requesting return call.  VM left for Gilberto.    Initiated referral to [facility] requesting home RN, PT, OT services.    Initiated  Wound VAC request ISABEL #[id number]    4453 Received follow up call from [facility] Liaison [phone] regarding pt insurance.  [facility] inquiring about VA Tri Care Benefits.  Per discussion with pt he has a VA card for health care but is not sure is Tri Care.  Card found on Media tab in EPIC.  Information given to Madison for her team to review.      1610: Received follow up call from Madison [employer] [facility].  Pt does not have VA Tri Care Benefits.  Insurance coverage through [facility] Medicare.  Pt does not have home IV coverage.  Pt weekly out of pocket cost for q 48 IV Daptomycin and infusion supplies would be $1319.53.  Initiated referral to [facility]  requesting benefit check.    12/15 0800 received email from Tre that pt insurance is out of network and they are not able to accept referral.      RNCC will continue to monitor.    Rose Mary Perales, RN BSN, PHN, ACM-RN  7A RN Care Coordinator  Phone: [phone]  Pager [phone]    To contact the weekend RNCC  [place] (0800 - 1630) Saturday and Sunday    Units: 5A,5B,5C [phone]    Units: 6A, [phone]    Units 6B, 6C, 6D [phone]    Units: 7A, 7B, 7C, 7D, [phone]    [facility] ([phone]) Saturday and Sunday  Units: 5 Ortho, 5MS & WB ED Pager: [phone]  Units: 6MS, 8A & 10 ICU  Pager [phone]    12/14/2023 2:53 PM

## 2023-12-15 ENCOUNTER — APPOINTMENT (OUTPATIENT)
Dept: PHYSICAL THERAPY | Facility: CLINIC | Age: 68
DRG: 464 | End: 2023-12-15
Attending: SURGERY
Payer: COMMERCIAL

## 2023-12-15 ENCOUNTER — ANESTHESIA (OUTPATIENT)
Dept: SURGERY | Facility: CLINIC | Age: 68
DRG: 464 | End: 2023-12-15
Payer: COMMERCIAL

## 2023-12-15 LAB
ABO/RH(D): NORMAL
ANTIBODY SCREEN: NEGATIVE
BACTERIA BONE ANAEROBE+AEROBE CULT: ABNORMAL
BACTERIA BONE ANAEROBE+AEROBE CULT: ABNORMAL
BACTERIA BONE ANAEROBE+AEROBE CULT: NO GROWTH
BACTERIA TISS BX CULT: NORMAL
BLD PROD TYP BPU: NORMAL
BLOOD COMPONENT TYPE: NORMAL
CODING SYSTEM: NORMAL
CROSSMATCH: NORMAL
GLUCOSE BLDC GLUCOMTR-MCNC: 105 MG/DL (ref 70–99)
GLUCOSE BLDC GLUCOMTR-MCNC: 87 MG/DL (ref 70–99)
GRAM STAIN RESULT: NORMAL
ISSUE DATE AND TIME: NORMAL
SPECIMEN EXPIRATION DATE: NORMAL
UFH PPP CHRO-ACNC: 0.39 IU/ML
UNIT ABO/RH: NORMAL
UNIT NUMBER: NORMAL
UNIT STATUS: NORMAL
UNIT TYPE ISBT: 5100

## 2023-12-15 PROCEDURE — 250N000011 HC RX IP 250 OP 636: Performed by: ANESTHESIOLOGY

## 2023-12-15 PROCEDURE — 11046 DBRDMT MUSC&/FSCA EA ADDL: CPT | Mod: 58 | Performed by: SURGERY

## 2023-12-15 PROCEDURE — 11043 DBRDMT MUSC&/FSCA 1ST 20/<: CPT | Mod: 58 | Performed by: SURGERY

## 2023-12-15 PROCEDURE — 87070 CULTURE OTHR SPECIMN AEROBIC: CPT | Performed by: SURGERY

## 2023-12-15 PROCEDURE — 87206 SMEAR FLUORESCENT/ACID STAI: CPT | Performed by: SURGERY

## 2023-12-15 PROCEDURE — 250N000011 HC RX IP 250 OP 636: Performed by: SURGERY

## 2023-12-15 PROCEDURE — 258N000001 HC RX 258: Performed by: SURGERY

## 2023-12-15 PROCEDURE — 258N000003 HC RX IP 258 OP 636: Performed by: ANESTHESIOLOGY

## 2023-12-15 PROCEDURE — 99024 POSTOP FOLLOW-UP VISIT: CPT | Performed by: NURSE PRACTITIONER

## 2023-12-15 PROCEDURE — 258N000003 HC RX IP 258 OP 636: Performed by: NURSE PRACTITIONER

## 2023-12-15 PROCEDURE — 87205 SMEAR GRAM STAIN: CPT | Performed by: SURGERY

## 2023-12-15 PROCEDURE — 97605 NEG PRS WND THER DME<=50SQCM: CPT | Mod: 58 | Performed by: SURGERY

## 2023-12-15 PROCEDURE — 272N000001 HC OR GENERAL SUPPLY STERILE: Performed by: SURGERY

## 2023-12-15 PROCEDURE — C1713 ANCHOR/SCREW BN/BN,TIS/BN: HCPCS | Performed by: SURGERY

## 2023-12-15 PROCEDURE — 250N000011 HC RX IP 250 OP 636: Mod: JZ | Performed by: NURSE ANESTHETIST, CERTIFIED REGISTERED

## 2023-12-15 PROCEDURE — 710N000011 HC RECOVERY PHASE 1, LEVEL 3, PER MIN: Performed by: SURGERY

## 2023-12-15 PROCEDURE — 85520 HEPARIN ASSAY: CPT | Performed by: SURGERY

## 2023-12-15 PROCEDURE — 250N000011 HC RX IP 250 OP 636: Mod: JZ | Performed by: SURGERY

## 2023-12-15 PROCEDURE — 250N000013 HC RX MED GY IP 250 OP 250 PS 637: Performed by: NURSE PRACTITIONER

## 2023-12-15 PROCEDURE — 250N000013 HC RX MED GY IP 250 OP 250 PS 637: Performed by: PHYSICIAN ASSISTANT

## 2023-12-15 PROCEDURE — 258N000003 HC RX IP 258 OP 636

## 2023-12-15 PROCEDURE — 250N000009 HC RX 250: Performed by: NURSE ANESTHETIST, CERTIFIED REGISTERED

## 2023-12-15 PROCEDURE — 86923 COMPATIBILITY TEST ELECTRIC: CPT | Performed by: SURGERY

## 2023-12-15 PROCEDURE — 120N000011 HC R&B TRANSPLANT UMMC

## 2023-12-15 PROCEDURE — 370N000017 HC ANESTHESIA TECHNICAL FEE, PER MIN: Performed by: SURGERY

## 2023-12-15 PROCEDURE — 258N000003 HC RX IP 258 OP 636: Performed by: NURSE ANESTHETIST, CERTIFIED REGISTERED

## 2023-12-15 PROCEDURE — 999N000141 HC STATISTIC PRE-PROCEDURE NURSING ASSESSMENT: Performed by: SURGERY

## 2023-12-15 PROCEDURE — 97530 THERAPEUTIC ACTIVITIES: CPT | Mod: GP

## 2023-12-15 PROCEDURE — 3E0102A INTRODUCTION OF ANTI-INFECTIVE ENVELOPE INTO SUBCUTANEOUS TISSUE, OPEN APPROACH: ICD-10-PCS | Performed by: SURGERY

## 2023-12-15 PROCEDURE — 88305 TISSUE EXAM BY PATHOLOGIST: CPT | Mod: 26 | Performed by: PATHOLOGY

## 2023-12-15 PROCEDURE — 250N000025 HC SEVOFLURANE, PER MIN: Performed by: SURGERY

## 2023-12-15 PROCEDURE — 250N000009 HC RX 250: Performed by: ANESTHESIOLOGY

## 2023-12-15 PROCEDURE — 87075 CULTR BACTERIA EXCEPT BLOOD: CPT | Performed by: SURGERY

## 2023-12-15 PROCEDURE — 0QB90ZX EXCISION OF LEFT FEMORAL SHAFT, OPEN APPROACH, DIAGNOSTIC: ICD-10-PCS | Performed by: SURGERY

## 2023-12-15 PROCEDURE — 88311 DECALCIFY TISSUE: CPT | Mod: 26 | Performed by: PATHOLOGY

## 2023-12-15 PROCEDURE — 250N000011 HC RX IP 250 OP 636: Performed by: NURSE PRACTITIONER

## 2023-12-15 PROCEDURE — 97112 NEUROMUSCULAR REEDUCATION: CPT | Mod: GP

## 2023-12-15 PROCEDURE — 360N000076 HC SURGERY LEVEL 3, PER MIN: Performed by: SURGERY

## 2023-12-15 PROCEDURE — 97110 THERAPEUTIC EXERCISES: CPT | Mod: GP

## 2023-12-15 PROCEDURE — 88311 DECALCIFY TISSUE: CPT | Mod: TC | Performed by: SURGERY

## 2023-12-15 PROCEDURE — 36592 COLLECT BLOOD FROM PICC: CPT | Performed by: SURGERY

## 2023-12-15 PROCEDURE — 20245 BONE BIOPSY OPEN DEEP: CPT | Mod: 58 | Performed by: SURGERY

## 2023-12-15 PROCEDURE — 87116 MYCOBACTERIA CULTURE: CPT | Performed by: SURGERY

## 2023-12-15 PROCEDURE — 86900 BLOOD TYPING SEROLOGIC ABO: CPT | Performed by: SURGERY

## 2023-12-15 PROCEDURE — 87102 FUNGUS ISOLATION CULTURE: CPT | Performed by: SURGERY

## 2023-12-15 DEVICE — SIMPLEX® HV WITH GENTAMICIN IS A FAST-SETTING ACRYLIC RESIN WITH ADDITION OF GENTAMICIN SULFATE FOR USE IN BONE SURGERY. MIXING THE TWO SEPARATE STERILE COMPONENTS PRODUCES A DUCTILE BONE CEMENT WHICH, AFTER HARDENING, FIXES THE IMPLANT AND TRANSFERS STRESSES PRODUCED DURING MOVEMENT EVENLY TO THE BONE. SIMPLEX® HV WITH GENTAMICINN CEMENT POWDER ALSO CONTAINS INSOLUBLE ZIRCONIUM DIOXIDE AS AN X-RAY CONTRAST MEDIUM. SIMPLEX® HV WITH GENTAMICIN DOES NOT EMIT A SIGNAL AND DOES NOT POSE A SAFETY RISK IN A MAGNETIC RESONANCE ENVIRONMENT.
Type: IMPLANTABLE DEVICE | Site: LEG | Status: FUNCTIONAL
Brand: SIMPLEX HV WITH GENTAMICIN

## 2023-12-15 RX ORDER — FENTANYL CITRATE 50 UG/ML
INJECTION, SOLUTION INTRAMUSCULAR; INTRAVENOUS PRN
Status: DISCONTINUED | OUTPATIENT
Start: 2023-12-15 | End: 2023-12-15

## 2023-12-15 RX ORDER — FENTANYL CITRATE 50 UG/ML
50 INJECTION, SOLUTION INTRAMUSCULAR; INTRAVENOUS EVERY 5 MIN PRN
Status: DISCONTINUED | OUTPATIENT
Start: 2023-12-15 | End: 2023-12-15 | Stop reason: HOSPADM

## 2023-12-15 RX ORDER — HYDROMORPHONE HCL IN WATER/PF 6 MG/30 ML
0.2 PATIENT CONTROLLED ANALGESIA SYRINGE INTRAVENOUS EVERY 5 MIN PRN
Status: DISCONTINUED | OUTPATIENT
Start: 2023-12-15 | End: 2023-12-15 | Stop reason: HOSPADM

## 2023-12-15 RX ORDER — DEXAMETHASONE SODIUM PHOSPHATE 4 MG/ML
INJECTION, SOLUTION INTRA-ARTICULAR; INTRALESIONAL; INTRAMUSCULAR; INTRAVENOUS; SOFT TISSUE PRN
Status: DISCONTINUED | OUTPATIENT
Start: 2023-12-15 | End: 2023-12-15

## 2023-12-15 RX ORDER — ONDANSETRON 2 MG/ML
INJECTION INTRAMUSCULAR; INTRAVENOUS PRN
Status: DISCONTINUED | OUTPATIENT
Start: 2023-12-15 | End: 2023-12-15

## 2023-12-15 RX ORDER — SODIUM CHLORIDE, SODIUM LACTATE, POTASSIUM CHLORIDE, CALCIUM CHLORIDE 600; 310; 30; 20 MG/100ML; MG/100ML; MG/100ML; MG/100ML
INJECTION, SOLUTION INTRAVENOUS CONTINUOUS
Status: DISCONTINUED | OUTPATIENT
Start: 2023-12-15 | End: 2023-12-15

## 2023-12-15 RX ORDER — SODIUM CHLORIDE, SODIUM LACTATE, POTASSIUM CHLORIDE, CALCIUM CHLORIDE 600; 310; 30; 20 MG/100ML; MG/100ML; MG/100ML; MG/100ML
INJECTION, SOLUTION INTRAVENOUS CONTINUOUS
Status: DISCONTINUED | OUTPATIENT
Start: 2023-12-15 | End: 2023-12-16

## 2023-12-15 RX ORDER — PROPOFOL 10 MG/ML
INJECTION, EMULSION INTRAVENOUS PRN
Status: DISCONTINUED | OUTPATIENT
Start: 2023-12-15 | End: 2023-12-15

## 2023-12-15 RX ORDER — LABETALOL HYDROCHLORIDE 5 MG/ML
10 INJECTION, SOLUTION INTRAVENOUS
Status: DISCONTINUED | OUTPATIENT
Start: 2023-12-15 | End: 2023-12-15 | Stop reason: HOSPADM

## 2023-12-15 RX ORDER — FENTANYL CITRATE 50 UG/ML
25 INJECTION, SOLUTION INTRAMUSCULAR; INTRAVENOUS EVERY 5 MIN PRN
Status: DISCONTINUED | OUTPATIENT
Start: 2023-12-15 | End: 2023-12-15 | Stop reason: HOSPADM

## 2023-12-15 RX ORDER — HYDROMORPHONE HCL IN WATER/PF 6 MG/30 ML
0.4 PATIENT CONTROLLED ANALGESIA SYRINGE INTRAVENOUS EVERY 5 MIN PRN
Status: DISCONTINUED | OUTPATIENT
Start: 2023-12-15 | End: 2023-12-15 | Stop reason: HOSPADM

## 2023-12-15 RX ORDER — LIDOCAINE 40 MG/G
CREAM TOPICAL
Status: DISCONTINUED | OUTPATIENT
Start: 2023-12-15 | End: 2023-12-15 | Stop reason: HOSPADM

## 2023-12-15 RX ORDER — ONDANSETRON 4 MG/1
4 TABLET, ORALLY DISINTEGRATING ORAL EVERY 30 MIN PRN
Status: DISCONTINUED | OUTPATIENT
Start: 2023-12-15 | End: 2023-12-15 | Stop reason: HOSPADM

## 2023-12-15 RX ORDER — SODIUM CHLORIDE, SODIUM LACTATE, POTASSIUM CHLORIDE, CALCIUM CHLORIDE 600; 310; 30; 20 MG/100ML; MG/100ML; MG/100ML; MG/100ML
INJECTION, SOLUTION INTRAVENOUS CONTINUOUS
Status: DISCONTINUED | OUTPATIENT
Start: 2023-12-15 | End: 2023-12-15 | Stop reason: HOSPADM

## 2023-12-15 RX ORDER — SODIUM CHLORIDE, SODIUM LACTATE, POTASSIUM CHLORIDE, CALCIUM CHLORIDE 600; 310; 30; 20 MG/100ML; MG/100ML; MG/100ML; MG/100ML
INJECTION, SOLUTION INTRAVENOUS CONTINUOUS PRN
Status: DISCONTINUED | OUTPATIENT
Start: 2023-12-15 | End: 2023-12-15

## 2023-12-15 RX ORDER — ONDANSETRON 2 MG/ML
4 INJECTION INTRAMUSCULAR; INTRAVENOUS EVERY 30 MIN PRN
Status: DISCONTINUED | OUTPATIENT
Start: 2023-12-15 | End: 2023-12-15 | Stop reason: HOSPADM

## 2023-12-15 RX ADMIN — ONDANSETRON 4 MG: 2 INJECTION INTRAMUSCULAR; INTRAVENOUS at 19:04

## 2023-12-15 RX ADMIN — AMLODIPINE BESYLATE 10 MG: 10 TABLET ORAL at 09:41

## 2023-12-15 RX ADMIN — SODIUM CHLORIDE, POTASSIUM CHLORIDE, SODIUM LACTATE AND CALCIUM CHLORIDE: 600; 310; 30; 20 INJECTION, SOLUTION INTRAVENOUS at 20:52

## 2023-12-15 RX ADMIN — PHENYLEPHRINE HYDROCHLORIDE 200 MCG: 10 INJECTION INTRAVENOUS at 18:39

## 2023-12-15 RX ADMIN — METOPROLOL TARTRATE 100 MG: 50 TABLET, FILM COATED ORAL at 09:42

## 2023-12-15 RX ADMIN — METOPROLOL TARTRATE 100 MG: 50 TABLET, FILM COATED ORAL at 22:19

## 2023-12-15 RX ADMIN — FENTANYL CITRATE 150 MCG: 50 INJECTION INTRAMUSCULAR; INTRAVENOUS at 18:26

## 2023-12-15 RX ADMIN — HEPARIN SODIUM AND DEXTROSE 1000 UNITS/HR: 10000; 5 INJECTION INTRAVENOUS at 14:18

## 2023-12-15 RX ADMIN — SUGAMMADEX 50 MG: 100 INJECTION, SOLUTION INTRAVENOUS at 19:51

## 2023-12-15 RX ADMIN — ONDANSETRON 4 MG: 2 INJECTION INTRAMUSCULAR; INTRAVENOUS at 19:49

## 2023-12-15 RX ADMIN — ACETAMINOPHEN 975 MG: 325 TABLET, FILM COATED ORAL at 22:20

## 2023-12-15 RX ADMIN — SENNOSIDES 1 TABLET: 8.6 TABLET, FILM COATED ORAL at 22:19

## 2023-12-15 RX ADMIN — SODIUM CHLORIDE, POTASSIUM CHLORIDE, SODIUM LACTATE AND CALCIUM CHLORIDE: 600; 310; 30; 20 INJECTION, SOLUTION INTRAVENOUS at 18:25

## 2023-12-15 RX ADMIN — MELATONIN 3 MG: at 22:19

## 2023-12-15 RX ADMIN — ACETAMINOPHEN 975 MG: 325 TABLET, FILM COATED ORAL at 09:41

## 2023-12-15 RX ADMIN — PHENYLEPHRINE HYDROCHLORIDE 200 MCG: 10 INJECTION INTRAVENOUS at 19:27

## 2023-12-15 RX ADMIN — ATORVASTATIN CALCIUM 10 MG: 10 TABLET, FILM COATED ORAL at 22:18

## 2023-12-15 RX ADMIN — Medication 5 ML: at 06:24

## 2023-12-15 RX ADMIN — PHENYLEPHRINE HYDROCHLORIDE 200 MCG: 10 INJECTION INTRAVENOUS at 19:35

## 2023-12-15 RX ADMIN — PHENYLEPHRINE HYDROCHLORIDE 300 MCG: 10 INJECTION INTRAVENOUS at 19:09

## 2023-12-15 RX ADMIN — Medication 50 MCG: at 11:53

## 2023-12-15 RX ADMIN — PHENYLEPHRINE HYDROCHLORIDE 200 MCG: 10 INJECTION INTRAVENOUS at 18:48

## 2023-12-15 RX ADMIN — SUGAMMADEX 50 MG: 100 INJECTION, SOLUTION INTRAVENOUS at 19:49

## 2023-12-15 RX ADMIN — SODIUM CHLORIDE, POTASSIUM CHLORIDE, SODIUM LACTATE AND CALCIUM CHLORIDE: 600; 310; 30; 20 INJECTION, SOLUTION INTRAVENOUS at 11:48

## 2023-12-15 RX ADMIN — DEXAMETHASONE SODIUM PHOSPHATE 4 MG: 4 INJECTION, SOLUTION INTRA-ARTICULAR; INTRALESIONAL; INTRAMUSCULAR; INTRAVENOUS; SOFT TISSUE at 19:04

## 2023-12-15 RX ADMIN — Medication 50 MG: at 18:27

## 2023-12-15 RX ADMIN — QUETIAPINE FUMARATE 75 MG: 25 TABLET ORAL at 22:19

## 2023-12-15 RX ADMIN — PHENYLEPHRINE HYDROCHLORIDE 200 MCG: 10 INJECTION INTRAVENOUS at 19:19

## 2023-12-15 RX ADMIN — PROPOFOL 90 MG: 10 INJECTION, EMULSION INTRAVENOUS at 18:26

## 2023-12-15 ASSESSMENT — ACTIVITIES OF DAILY LIVING (ADL)
ADLS_ACUITY_SCORE: 41

## 2023-12-15 ASSESSMENT — LIFESTYLE VARIABLES: TOBACCO_USE: 1

## 2023-12-15 NOTE — PROGRESS NOTES
Care Management Follow Up    Length of Stay (days): 14    Expected Discharge Date: 12/16/2023     Concerns to be Addressed: discharge planning     Patient plan of care discussed at interdisciplinary rounds: Yes    Anticipated Discharge Disposition: Home, Home Care, Home Infusion     Anticipated Discharge Services: None  Anticipated Discharge DME: None    Patient/family educated on Medicare website which has current facility and service quality ratings: no  Education Provided on the Discharge Plan: Yes  Patient/Family in Agreement with the Plan: yes    Referrals Placed by CM/SW: External Care Coordination, Homecare, Home Infusion  Private pay costs discussed: Not applicable    Additional Information:   left for Gilberto Fuentes, [facility] [phone] requesting return call/confirmation regarding w/c request.    Met with pt and spouse.  Reviewed home infusion benefit checks from [employer], [facility] and [facility].  Discussed possible option of OP infusion center.  Pt and spouse concerned about pt being to physically get into clinic every other day for IV Daptomycin.  Pt and spouse requesting [employer] and will plan to pay privately for IV daptomycin and infusion supplies.   Reviewed that we are waiting on  a response from [facility] regarding w/c. Pt/spouse note VA may also be working on a w/c for patient.  [facility] [phone] may be a good contact/resource with the VA.    Reviewed above with Kulwant/Jia [employer].  Kulwant will send request to reopen referral.  [employer] will assist in coordinating home RN, PT, OT services       Spoke with Eric, [facility] [phone] regarding anticipated plan for discharge.  The VA had orders/request for shower chair and tub bench but not a w/c.  Eric agreed to follow up with pt care team and have them reach out to writer..     Received email from Maura Jacob  Wound vac Liaison requesting that script and updated wound measurements be faxed to Fax  [phone].  Signed script faxed.  Pt scheduled for OR at 3 p.m. today.  Gricel have weekend RNCC fax OP report over the weekend.     Anticipate discharge next week on Tuesday 12/19 pending confirmation of wound vac approval, w/c delivery, home infusion and home care RN/PT/OT services.    1340: Received confirmation from [facility] that [facility] has accepted for home RN services.    Received VM from Gilberto Fuentes, [facility] requesting return call.  Writer unable to reach rep so VM left indicating pt will discharge on Tuesday 12/19 and that pt will need w/c in hand at time of discharge.      RNCC will continue to monitor.    Discharge Services Confirmed:    Home infusion  [facility]  Phone # [phone]  Fax # [phone]   Home IV antibiotic/supplies    Home Care  [facility]   [phone],  Fax [phone]  RN, PT, OT    Discharge services/DME still pending:    Wound Vac:  3M wound vac order placed 12/14.  Vac approval pending. Vac provider needs surgical note/OP note from today.  Pt OR scheduled for 3 p.m.. ISABEL #[id number]     Wheelchair  Multiple messages left with responses from Gilberto Fuentes [facility] [phone]  but still need confirmation that w/c is approved and will be delivered on Tuesday 12/19.     Anticipate discharge Tuesday 12/19 pending confirmation of wound vac approval, w/c delivery and pt being medically cleared for discharge.        Rose Mary Perales, RN BSN, PHN, ACM-RN  7A RN Care Coordinator  Phone: [phone]  Pager [phone]    To contact the weekend RNCC  [place] (0800 - 1630) Saturday and Sunday    Units: 5A,5B,5C [phone]    Units: 6A, [phone]    Units 6B, 6C, 6D [phone]    Units: 7A, 7B, 7C, 7D, [phone]    [facility] ([phone]) Saturday and Sunday  Units: 5 Ortho, 5MS & WB ED Pager: [phone]  Units: 6MS, 8A & 10 ICU  Pager [phone]    12/15/2023 11:36 AM

## 2023-12-15 NOTE — PLAN OF CARE
Vitals: VSS on RA.   Neuros: Aox4. Blind bilateral. LLE above knee amputation.   IV: PIV SL. DL PICC HL - infusing heparin gtt, other lumen HL.   Labs/Electrolytes: Hep Xa redraw in AM since within range x2, last draw = 0.33.  Resp/trach: LSC.  Diet: Regular diet. NPO @ MN for OR tomorrow   Bowel status: LBM 12/14   : Voiding spontaneously.   Skin: L leg wound vac. L above knee amputation covered with ACE bandage. Approximated abdominal incision. Rash on neck and chest.   Pain: C/o rash chest and neck itching - offered PRN benadryl cream, pt declined. Scheduled tylenol given.   Activity: A1 + GB and walker.  Plan: Cont POC. NPO at midnight for OR revision of amputation stump and new wound vac - pt needs CHG bath completed.

## 2023-12-15 NOTE — PLAN OF CARE
Goal Outcome Evaluation:    Temp: 97.9  F (36.6  C) Temp src: Oral BP: (!) 143/93 Pulse: 75   Resp: 16 SpO2: 98 % O2 Device: None (Room air)      Neuro: A&Ox4.   Cardiac: SR. VSS.   Respiratory: RA.  GI/: Adequate urine output. No BM  Diet/appetite: NPO  Activity: Up with 1 assist   Pain: Denies  Skin: Left AKA wound vac   LDA's: Heparin infusing at 1000 unit/hr. LR running at 100ml/hr     Waiting to go to OR for Revision of left Amputation stump     Plan: Continue with POC. Notify primary team with changes.

## 2023-12-15 NOTE — PROGRESS NOTES
Home Infusion  Received referral from Rose Mary Perales RNCC for IV Daptomycin.  Benefits verified.  Patient is self paying and will be responsible for $98.26/ day for medication and supplies.  Called and spoke with Tabby and Alfredo to review home infusion services, review benefits and offer choice of providers.  Patient would like to remain in the [facility] system and will use [facility] for home infusion.  Confirmed discharge address, phone, and emergency contact information. Patient denies recent illness (not related to pre-existing conditions) or travel in the house hold. Confirmed allergies. No home health agency has been seeing the patient.     Tabby is willing to learn and manage home IV therapy.  Questions answered.  Plan for [facility] to provide home care services after discharge.    I will continue to follow until discharge and update pt once final orders are determined.    Thank you for the referral    Shlomo Marcum LPN, Coordinator   [facility]   [email or web address]  Office: [phone]

## 2023-12-15 NOTE — PLAN OF CARE
Goal Outcome Evaluation:       BP (!) 143/85 (BP Location: Left arm)   Pulse 73   Temp 97.7  F (36.5  C)   Resp 16   Wt 58.2 kg (128 lb 4.8 oz)   SpO2 98%   BMI 20.09 kg/m      Pt is A&OX4, VSS, pt on RA, denies SOB and nausea, pt is NPO starting midnight for a surgical procedure this morning. Pt has heparin running @ 10 with a redraw this morning. Pt is voiding without difficulty and had BM X1 this shift. No new health concerns at this time.

## 2023-12-15 NOTE — ANESTHESIA PREPROCEDURE EVALUATION
Anesthesia Pre-Procedure Evaluation    Patient: Alfredo Burnham   MRN: [id number] : 1955        Procedure : Procedure(s):  REVISION, AMPUTATION STUMP, LEFT LOWER EXTREMITY          Past Medical History:   Diagnosis Date    Hypertension 2011    Macular degeneration       Past Surgical History:   Procedure Laterality Date    AMPUTATE LEG ABOVE KNEE Left 10/17/2023    Procedure: AMPUTATION, ABOVE KNEE and Abdominal wound vac exchange;  Surgeon: Ash Boucher MBBS;  [facility]    AMPUTATE REVISION STUMP LOWER EXTREMITY Left 2023    Procedure: REVISION, AMPUTATION STUMP, LEFT LOWER EXTREMITY;  Surgeon: Boris Lowe;  [facility]    CLOSE SECONDARY WOUND ABDOMEN N/A 10/20/2023    Procedure: Delayed primary subcutaneous abdominal closure;  Surgeon: Boris Lowe;  [facility]    INCISION AND DRAINAGE ABDOMEN WASHOUT, COMBINED N/A 2023    Procedure: abdominal washout, combined, repacking,  abthera placement;  Surgeon: Ash Boucher MBBS;  [facility]    INCISION AND DRAINAGE ABDOMEN WASHOUT, COMBINED N/A 2023    Procedure: Abdominal washout, Retroperitoneal closure, washout left lower extremity wound;  Surgeon: Boris Lowe;  [facility]    INCISION AND DRAINAGE LOWER EXTREMITY, COMBINED Left 2023    Procedure: irrigation and drainage of collection left above knee amputation stump;  Surgeon: Ash Boucher MBBS;  [facility]    IR CVC NON TUNNEL LINE REMOVAL  10/18/2023    IR CVC TUNNEL PLACEMENT > 5 YRS OF AGE  10/18/2023    IR CVC TUNNEL REMOVAL RIGHT  2023    IR OR ANGIOGRAM  2023    IRRIGATION AND DEBRIDEMENT ABDOMEN WASHOUT, COMBINED N/A 2023    Procedure: exploration of  ABDOMINAL CAVITY, placement of abthera;  Surgeon: Boris Lowe;  [facility]    IRRIGATION AND DEBRIDEMENT ABDOMEN WASHOUT, COMBINED N/A 10/02/2023    Procedure: Exploratory laparotomy, abominal closure, wound vac change left  lower extremity;  Surgeon: Jonathan Fry MD;  [facility]    IRRIGATION AND DEBRIDEMENT LOWER EXTREMITY, COMBINED Left 09/29/2023    Procedure: exploration of left lower extremity, partial closure of left lower extremity, wound vac placement;  Surgeon: Boris Lowe;  [facility]    LAPAROTOMY EXPLORATORY N/A 09/25/2023    Procedure: Laparotomy exploratory;  Surgeon: Roger Shirley MD;  [facility]    PICC INSERTION - DOUBLE LUMEN Right 12/04/2023    43-1cm, Basilic vein    REPAIR ANEURYSM ABDOMINAL AORTA N/A 09/24/2023    Procedure: Resection of Ruptureed Abdominal Aneurysm, Aortic biliary bypass with 20 x 10 mm Bifurcated hemagard graft, Temporary Abdominal Closure, Endovascular Balloon Inclusion of Aorta;  Surgeon: Boris Lowe;  [facility]    SIGMOIDOSCOPY FLEXIBLE N/A 09/25/2023    Procedure: Sigmoidoscopy flexible;  Surgeon: Gabino Walker MD;  [facility]    THROMBECTOMY LOWER EXTREMITY Left 09/25/2023    Procedure: SFA, popliteal, and tibial thromboembolectomy and four compartment fasciotomy;  Surgeon: Ash Boucher MBBS;  [facility]      Allergies   Allergen Reactions    Penicillins Swelling     Facial swelling    Has tolerated cefazolin.     Cefepime Rash     Diffuse whole body erythematous pruritic rash      Social History     Tobacco Use    Smoking status: Every Day     Packs/day: .5     Types: Cigarettes    Smokeless tobacco: Not on file   Substance Use Topics    Alcohol use: Yes     Comment: 1-2/wk      Wt Readings from Last 1 Encounters:   12/11/23 58.2 kg (128 lb 4.8 oz)        Anesthesia Evaluation   Pt has had prior anesthetic. Type: General.        ROS/MED HX  ENT/Pulmonary: Comment: Left sided vocal cord palsy due to prolonged intubation.    (+)     RIGO risk factors, snores loudly, hypertension,     vocal cord abnormalities -  Hoarseness,   tobacco use, Past use,  50  Pack-Year Hx,                     Neurologic:     (+)          CVA,  date: 2023, without deficits,                    Cardiovascular: Comment: Infrarenal AAA ruptured 9/2023 requiring multiple surgeries      (+)  hypertension- -   -  - -   Taking blood thinners Pt has received instructions: Instructions Given to patient: Emiliano last dose 11/29/23.                                 METS/Exercise Tolerance:     Hematologic:     (+) History of blood clots,    pt is anticoagulated, anemia, history of blood transfusion, no previous transfusion reaction,        Musculoskeletal: Comment: Left AKA      GI/Hepatic:     (+) GERD, Asymptomatic on medication,                  Renal/Genitourinary:     (+) renal disease, type: CRI, Pt does not require dialysis,           Endo:  - neg endo ROS     Psychiatric/Substance Use:     (+)   alcohol abuse      Infectious Disease:  - neg infectious disease ROS     Malignancy:  - neg malignancy ROS     Other:            Physical Exam    Airway  airway exam normal      Mallampati: II   TM distance: > 3 FB   Neck ROM: limited   Mouth opening: > 3 cm    Respiratory Devices and Support         Dental       (+) Multiple visibly decayed, broken teeth      Cardiovascular   cardiovascular exam normal       Rhythm and rate: regular and normal     Pulmonary   pulmonary exam normal        breath sounds clear to auscultation           OUTSIDE LABS:  CBC:   Lab Results   Component Value Date    WBC 5.9 12/14/2023    WBC 6.2 12/13/2023    HGB 7.3 (L) 12/14/2023    HGB 7.4 (L) 12/13/2023    HCT 23.8 (L) 12/14/2023    HCT 23.6 (L) 12/13/2023     (L) 12/14/2023     (L) 12/13/2023     BMP:   Lab Results   Component Value Date     12/14/2023     12/11/2023    POTASSIUM 4.4 12/14/2023    POTASSIUM 4.2 12/11/2023    CHLORIDE 108 (H) 12/14/2023    CHLORIDE 108 (H) 12/11/2023    CO2 16 (L) 12/14/2023    CO2 18 (L) 12/11/2023    BUN 29.4 (H) 12/14/2023    BUN 32.8 (H) 12/11/2023    CR 1.95 (H) 12/14/2023    CR 2.05 (H) 12/11/2023    GLC 87 12/15/2023    GLC  "105 (H) 12/15/2023     COAGS:   Lab Results   Component Value Date    PTT 80 (H) 12/12/2023    INR 1.27 (H) 12/06/2023    FIBR 556 (H) 10/25/2023     POC: No results found for: \"BGM\", \"HCG\", \"HCGS\"  HEPATIC:   Lab Results   Component Value Date    ALBUMIN 3.5 12/08/2023    PROTTOTAL 6.3 (L) 12/08/2023    ALT 16 12/08/2023    AST 18 12/08/2023    ALKPHOS 71 12/08/2023    BILITOTAL 0.3 12/08/2023    BHUPENDRA 18 10/25/2023     OTHER:   Lab Results   Component Value Date    PH 7.41 10/14/2023    PH 7.41 10/14/2023    LACT 1.5 10/28/2023    A1C 5.7 (H) 09/25/2023    OMAR 9.2 12/14/2023    PHOS 4.6 (H) 12/14/2023    MAG 1.9 12/14/2023    LIPASE 51 10/14/2023    TSH 16.70 (H) 10/25/2023       Anesthesia Plan    ASA Status:  4    NPO Status:  NPO Appropriate    Anesthesia Type: General.     - Airway: ETT   Induction: Intravenous.   Maintenance: Balanced.   Techniques and Equipment:     - Lines/Monitors: 2nd IV     Consents    Anesthesia Plan(s) and associated risks, benefits, and realistic alternatives discussed. Questions answered and patient/representative(s) expressed understanding.     - Discussed: Risks, Benefits and Alternatives for BOTH SEDATION and the PROCEDURE were discussed     - Discussed with:  Patient      - Extended Intubation/Ventilatory Support Discussed: No.      - Patient is DNR/DNI Status: No     Use of blood products discussed: No .     Postoperative Care    Pain management: IV analgesics, Oral pain medications, Multi-modal analgesia.   PONV prophylaxis: Ondansetron (or other 5HT-3), Dexamethasone or Solumedrol     Comments:    Other Comments: The material risks, benefits, and alternatives were discussed in detail.  The patient agrees to proceed.  The patient has no other complaints at this time.       H&P reviewed: Unable to attach H&P to encounter due to EHR limitations. H&P Update: appropriate H&P reviewed, patient examined. No interval changes since H&P (within 30 days).        Mason Curran, " MD FARRIS have reviewed the pertinent notes and labs in the chart from the past 30 days and (re)examined the patient.  Any updates or changes from those notes are reflected in this note.

## 2023-12-15 NOTE — PROGRESS NOTES
Vascular Surgery Progress Note  12/15/2023       Subjective:  Slept well. NAEO. Pending OR today for LLE wound wash and debridement.      Objective:  Temp:  [97.6  F (36.4  C)-98.2  F (36.8  C)] 97.7  F (36.5  C)  Pulse:  [70-82] 81  Resp:  [16] 16  BP: (125-143)/(76-88) 131/87  SpO2:  [97 %-99 %] 99 %    I/O last 3 completed shifts:  In: -   Out: 1250 [Urine:675; Drains:575]      Gen: Awake, alert, NAD  CV: RRR per radial pulse  Resp: NLB on RA  Abd: soft, nondistended, nontender  Incision: abdominal incision healed.  LLE dressing VAC intact with appropriate suction. Wound is healing well (assessed on 12/15/23), without sloughing, no bleeding upon inspection, has granulation tissue. No purulent drainage noted. ACE wrap on at all times.   Ext: WWP, no edema.     LLE wound size, surgical incision:   Depth 12cm  Length 11cm  Width 4cm  Serosanguineous drainage, moderate amount.      Labs:  Recent Labs   Lab 12/14/23  0530 12/13/23  0617 12/11/23  0610   WBC 5.9 6.2 5.8   HGB 7.3* 7.4* 7.7*   * 109* 93*       Recent Labs   Lab 12/15/23  1107 12/14/23  0530 12/11/23  0610   NA  --  136 136   POTASSIUM  --  4.4 4.2   CHLORIDE  --  108* 108*   CO2  --  16* 18*   BUN  --  29.4* 32.8*   CR  --  1.95* 2.05*   * 96 100*   OMAR  --  9.2 9.0   MAG  --  1.9 1.9   PHOS  --  4.6* 4.2        Assessment/Plan:   68 year old male well known to vascular surgery w/ h/o rutpured pararenal aneurysm s/p open repair in September of this year who was found to have drainage from his stump site concerning for infection now s/p washout on 12/1. Now growing VRE on wound cultures from 11/29. Given recent cultures, patient underwent first stage of stump revision on 12/8/2023 and VAC placement. Recovering well. Bone biopsy proximal culture with VRE. RTOR today for wound debridement. NPO after midnight.    - Pain controlled  - NPO after midnight for OR.  - IVF LR at 100ml/hr  - PT/OT/SLP for recovery  - Resumed prior meds (except  eliquis)  - ID following given VRE, daptomycin - anticipating 6 week course. Picc placed 12/4/23.  - Heparin drip low intensity. To hold on call to OR.  - VAC on LLE to be changed by vascular surgery only M/W/F (changed 12/13/23).     SW engaged for discharge planning.     LLE wound size, surgical incision:   Depth 12cm  Length 11cm  Width 4cm  Serosanguineous drainage, moderate amount.      Discussed pt history, exam, assessment and plan with Dr. Lowe of the vascular surgery service, who is in agreement with the above.    Miryam Carrasco, [facility]  Vascular Surgery  Pager: [phone]  [email or web address]  Send message or 10 digit call back number Securely via [employer] with the [employer] Web Console (learn more here)

## 2023-12-15 NOTE — PROGRESS NOTES
Infectious Disease - Chart update:     Chart reviewed. I have not seen/examined the patient today. Noted plan for RTOR today. Please review ID progress note dated 12/14/2023 for details.     ID team will continue to follow. Please reach out if any questions or concerns.     For urgent questions over the weekend, please reach to on-call ID provider per [facility]. Dr. Tellez will assume [facility] ID service from Monday 12/18/2023 onwards.     Nidia Garcia MD  Infectious Disease Staff Physician  Pager: [phone]  [facility]   Date: 12/15/2023

## 2023-12-16 ENCOUNTER — APPOINTMENT (OUTPATIENT)
Dept: OCCUPATIONAL THERAPY | Facility: CLINIC | Age: 68
DRG: 464 | End: 2023-12-16
Attending: SURGERY
Payer: COMMERCIAL

## 2023-12-16 LAB
ANION GAP SERPL CALCULATED.3IONS-SCNC: 9 MMOL/L (ref 7–15)
BUN SERPL-MCNC: 26.6 MG/DL (ref 8–23)
CALCIUM SERPL-MCNC: 8.7 MG/DL (ref 8.8–10.2)
CHLORIDE SERPL-SCNC: 109 MMOL/L (ref 98–107)
CK SERPL-CCNC: 15 U/L (ref 39–308)
CREAT SERPL-MCNC: 1.84 MG/DL (ref 0.67–1.17)
DEPRECATED HCO3 PLAS-SCNC: 18 MMOL/L (ref 22–29)
EGFRCR SERPLBLD CKD-EPI 2021: 39 ML/MIN/1.73M2
ERYTHROCYTE [DISTWIDTH] IN BLOOD BY AUTOMATED COUNT: 16.9 % (ref 10–15)
GLUCOSE SERPL-MCNC: 105 MG/DL (ref 70–99)
HCT VFR BLD AUTO: 20.6 % (ref 40–53)
HGB BLD-MCNC: 6.4 G/DL (ref 13.3–17.7)
HGB BLD-MCNC: 7.9 G/DL (ref 13.3–17.7)
MCH RBC QN AUTO: 30.6 PG (ref 26.5–33)
MCHC RBC AUTO-ENTMCNC: 31.1 G/DL (ref 31.5–36.5)
MCV RBC AUTO: 99 FL (ref 78–100)
PLATELET # BLD AUTO: 88 10E3/UL (ref 150–450)
POTASSIUM SERPL-SCNC: 4.7 MMOL/L (ref 3.4–5.3)
RBC # BLD AUTO: 2.09 10E6/UL (ref 4.4–5.9)
SODIUM SERPL-SCNC: 136 MMOL/L (ref 135–145)
UFH PPP CHRO-ACNC: 0.13 IU/ML
UFH PPP CHRO-ACNC: <0.1 IU/ML
UFH PPP CHRO-ACNC: >1.1 IU/ML
WBC # BLD AUTO: 5.9 10E3/UL (ref 4–11)

## 2023-12-16 PROCEDURE — 36415 COLL VENOUS BLD VENIPUNCTURE: CPT | Performed by: SURGERY

## 2023-12-16 PROCEDURE — 250N000011 HC RX IP 250 OP 636: Mod: JZ | Performed by: SURGERY

## 2023-12-16 PROCEDURE — 80048 BASIC METABOLIC PNL TOTAL CA: CPT | Performed by: SURGERY

## 2023-12-16 PROCEDURE — 250N000013 HC RX MED GY IP 250 OP 250 PS 637: Performed by: SURGERY

## 2023-12-16 PROCEDURE — 258N000003 HC RX IP 258 OP 636: Performed by: NURSE PRACTITIONER

## 2023-12-16 PROCEDURE — 85018 HEMOGLOBIN: CPT

## 2023-12-16 PROCEDURE — 250N000013 HC RX MED GY IP 250 OP 250 PS 637: Performed by: PHYSICIAN ASSISTANT

## 2023-12-16 PROCEDURE — 250N000013 HC RX MED GY IP 250 OP 250 PS 637: Performed by: NURSE PRACTITIONER

## 2023-12-16 PROCEDURE — 120N000011 HC R&B TRANSPLANT UMMC

## 2023-12-16 PROCEDURE — 258N000003 HC RX IP 258 OP 636: Performed by: SURGERY

## 2023-12-16 PROCEDURE — 85027 COMPLETE CBC AUTOMATED: CPT | Performed by: SURGERY

## 2023-12-16 PROCEDURE — 36592 COLLECT BLOOD FROM PICC: CPT | Performed by: SURGERY

## 2023-12-16 PROCEDURE — 97530 THERAPEUTIC ACTIVITIES: CPT | Mod: GO

## 2023-12-16 PROCEDURE — 258N000003 HC RX IP 258 OP 636: Performed by: STUDENT IN AN ORGANIZED HEALTH CARE EDUCATION/TRAINING PROGRAM

## 2023-12-16 PROCEDURE — P9016 RBC LEUKOCYTES REDUCED: HCPCS | Performed by: SURGERY

## 2023-12-16 PROCEDURE — 82550 ASSAY OF CK (CPK): CPT | Performed by: SURGERY

## 2023-12-16 PROCEDURE — 36592 COLLECT BLOOD FROM PICC: CPT

## 2023-12-16 PROCEDURE — 85520 HEPARIN ASSAY: CPT | Performed by: SURGERY

## 2023-12-16 RX ORDER — SODIUM CHLORIDE, SODIUM LACTATE, POTASSIUM CHLORIDE, CALCIUM CHLORIDE 600; 310; 30; 20 MG/100ML; MG/100ML; MG/100ML; MG/100ML
INJECTION, SOLUTION INTRAVENOUS CONTINUOUS
Status: DISCONTINUED | OUTPATIENT
Start: 2023-12-16 | End: 2023-12-17

## 2023-12-16 RX ADMIN — SODIUM CHLORIDE, POTASSIUM CHLORIDE, SODIUM LACTATE AND CALCIUM CHLORIDE: 600; 310; 30; 20 INJECTION, SOLUTION INTRAVENOUS at 10:43

## 2023-12-16 RX ADMIN — Medication 50 MCG: at 12:16

## 2023-12-16 RX ADMIN — PANTOPRAZOLE SODIUM 40 MG: 40 TABLET, DELAYED RELEASE ORAL at 18:25

## 2023-12-16 RX ADMIN — METOPROLOL TARTRATE 100 MG: 50 TABLET, FILM COATED ORAL at 19:54

## 2023-12-16 RX ADMIN — ACETAMINOPHEN 975 MG: 325 TABLET, FILM COATED ORAL at 15:06

## 2023-12-16 RX ADMIN — MELATONIN 3 MG: at 19:55

## 2023-12-16 RX ADMIN — ACETAMINOPHEN 975 MG: 325 TABLET, FILM COATED ORAL at 19:55

## 2023-12-16 RX ADMIN — QUETIAPINE FUMARATE 75 MG: 25 TABLET ORAL at 19:55

## 2023-12-16 RX ADMIN — DOCUSATE SODIUM 50 MG: 50 CAPSULE, LIQUID FILLED ORAL at 08:04

## 2023-12-16 RX ADMIN — SENNOSIDES 1 TABLET: 8.6 TABLET, FILM COATED ORAL at 09:16

## 2023-12-16 RX ADMIN — ACETAMINOPHEN 975 MG: 325 TABLET, FILM COATED ORAL at 08:04

## 2023-12-16 RX ADMIN — METOPROLOL TARTRATE 100 MG: 50 TABLET, FILM COATED ORAL at 08:04

## 2023-12-16 RX ADMIN — ATORVASTATIN CALCIUM 10 MG: 10 TABLET, FILM COATED ORAL at 19:54

## 2023-12-16 RX ADMIN — AMLODIPINE BESYLATE 10 MG: 10 TABLET ORAL at 08:04

## 2023-12-16 RX ADMIN — PSYLLIUM HUSK 1 PACKET: 3.4 POWDER ORAL at 08:05

## 2023-12-16 RX ADMIN — OXYCODONE HYDROCHLORIDE 5 MG: 5 TABLET ORAL at 19:55

## 2023-12-16 RX ADMIN — SODIUM CHLORIDE, POTASSIUM CHLORIDE, SODIUM LACTATE AND CALCIUM CHLORIDE: 600; 310; 30; 20 INJECTION, SOLUTION INTRAVENOUS at 12:54

## 2023-12-16 RX ADMIN — DAPTOMYCIN 700 MG: 500 INJECTION, POWDER, LYOPHILIZED, FOR SOLUTION INTRAVENOUS at 16:14

## 2023-12-16 ASSESSMENT — ACTIVITIES OF DAILY LIVING (ADL)
ADLS_ACUITY_SCORE: 39
ADLS_ACUITY_SCORE: 41
ADLS_ACUITY_SCORE: 39
ADLS_ACUITY_SCORE: 41

## 2023-12-16 NOTE — PLAN OF CARE
Goal Outcome Evaluation:      Plan of Care Reviewed With: patient, spouse    Overall Patient Progress: no change      VSS on RA. A&O x4, able to make needs known. Denies pain with scheduled tylenol. Left AKA stump with wound vac at 75 mmHg, ACE wrap changed this shift (after part of it was soiled with stool) per MD(refer to charge RN note). Voiding via urinal. Incontinent of loose BM this shift, hold BM meds for now. Left PIV SL. R DL PICC infusing with heparin at 500 units/hr, other port with LR at 100 mL/hr.  Up with SENG walker. MCHO diet. Hemoglobin check at 1800. Cont to monitor and with POC.

## 2023-12-16 NOTE — PROGRESS NOTES
Care Management Follow Up    Length of Stay (days): 15    Expected Discharge Date: 12/19/2023     Concerns to be Addressed: discharge planning     Patient plan of care discussed at interdisciplinary rounds: Yes    Anticipated Discharge Disposition: Home, Home Care, Home Infusion     Anticipated Discharge Services: None  Anticipated Discharge DME: None    Patient/family educated on Medicare website which has current facility and service quality ratings: no  Education Provided on the Discharge Plan: Yes  Patient/Family in Agreement with the Plan: yes    Referrals Placed by CM/SW: External Care Coordination, Homecare, Home Infusion  Private pay costs discussed: Not applicable    Additional Information:  Pt on weekend RNCC requesting OP report and provider note be faxed to [employer] [facility].  Clinical information faxed to Dia Clayton  Liaison.    1145: Received email confirmation from Dia that wound vac has been approved.      Anticipate discharge Tuesday 12/19 pending confirmation w/c delivery and pt being medically cleared for discharge.          Discharge services/DME still pending:     Wheelchair  Multiple messages left with responses from Gilberto Fuentes [facility] [employer] [phone] ext [phone]  but still need confirmation that w/c is approved and will be delivered on Tuesday 12/19.       Discharge Services Confirmed:     Home infusion  [facility]  Phone # [phone]  Fax # [phone]   Home IV antibiotic/supplies     Home Care  [facility]   [phone],  Fax [phone]  RN, PT, OT     Wound Vac: Wound vac approved  [facility] #[id number]     Rose Mary Perales RN BSN, PHN, ACM-RN  7A RN Care Coordinator  Phone: [phone]  Pager [phone]    To contact the weekend RNCC  [facility] (0800 - 1630) Saturday and Sunday    Units: 5A,5B,5C [phone]    Units: 6A, [phone]    Units 6B, 6C, 6D [phone]    Units: 7A, 7B, 7C, 7D, [phone]    [facility] ([phone]) Saturday and Sunday  Units: 5 Ortho, 5MS  & WB ED Pager: [phone]  Units: 6MS, 8A & 10 ICU  Pager [phone]    12/16/2023 11:08 AM

## 2023-12-16 NOTE — ANESTHESIA POSTPROCEDURE EVALUATION
Patient: Alfredo Burnham    Procedure: Procedure(s):  IRRIGATION AND DEBRIDEMENT OF LEFT AMPUTATION STUMP, WITH CEMENT ANTIBIOTIC BEAD INSERTION X3       Anesthesia Type:  General    Note:  Disposition: Inpatient   Postop Pain Control: Uneventful            Sign Out: Well controlled pain   PONV: No   Neuro/Psych: Uneventful            Sign Out: Acceptable/Baseline neuro status   Airway/Respiratory: Uneventful            Sign Out: Acceptable/Baseline resp. status   CV/Hemodynamics: Uneventful            Sign Out: Acceptable CV status; No obvious hypovolemia; No obvious fluid overload   Other NRE: NONE   DID A NON-ROUTINE EVENT OCCUR? No           Last vitals:  Vitals Value Taken Time   /82 12/15/23 2100   Temp 36.4  C (97.5  F) 12/15/23 2030   Pulse 87 12/15/23 2101   Resp 26 12/15/23 2101   SpO2 99 % 12/15/23 2104   Vitals shown include unfiled device data.    Electronically Signed By: Christian Gomes MD  December 15, 2023  9:09 PM

## 2023-12-16 NOTE — ANESTHESIA CARE TRANSFER NOTE
Patient: Alfredo Burnham    Procedure: Procedure(s):  IRRIGATION AND DEBRIDEMENT OF LEFT AMPUTATION STUMP, WITH CEMENT ANTIBIOTIC BEAD INSERTION X3       Diagnosis: Infection following a procedure, deep incisional surgical site, initial encounter [T81.42XA]  Diagnosis Additional Information: No value filed.    Anesthesia Type:   General     Note:    Oropharynx: oropharynx clear of all foreign objects  Level of Consciousness: awake  Oxygen Supplementation: room air    Independent Airway: airway patency satisfactory and stable  Dentition: dentition unchanged  Vital Signs Stable: post-procedure vital signs reviewed and stable  Report to RN Given: handoff report given  Patient transferred to: PACU    Handoff Report: Identifed the Patient, Identified the Reponsible Provider, Reviewed the pertinent medical history, Discussed the surgical course, Reviewed Intra-OP anesthesia mangement and issues during anesthesia, Set expectations for post-procedure period and Allowed opportunity for questions and acknowledgement of understanding      Vitals:  Vitals Value Taken Time   /94 12/15/23 2003   Temp     Pulse 89 12/15/23 2006   Resp 17 12/15/23 2006   SpO2 100 % 12/15/23 2006   Vitals shown include unfiled device data.    Electronically Signed By: ALTAGRACIA Mejía CRNA  December 15, 2023  8:08 PM

## 2023-12-16 NOTE — ANESTHESIA PROCEDURE NOTES
Airway       Patient location during procedure: OR       Procedure Start/Stop Times: 12/15/2023 6:29 PM  Staff -        CRNA: Beckie Malone APRN CRNA       Performed By: CRNA  Consent for Airway        Urgency: elective  Indications and Patient Condition       Indications for airway management: filemon-procedural       Induction type:intravenous       Mask difficulty assessment: 1 - vent by mask    Final Airway Details       Final airway type: endotracheal airway       Successful airway: ETT - single and Oral  Endotracheal Airway Details        ETT size (mm): 7.5       Cuffed: yes       Successful intubation technique: direct laryngoscopy       DL Blade Type: Palacio 2       Grade View of Cords: 1       Adjucts: stylet       Position: Right       Measured from: gums/teeth       Secured at (cm): 23    Post intubation assessment        Placement verified by: capnometry, equal breath sounds and chest rise        Number of attempts at approach: 1       Secured with: tape       Ease of procedure: easy       Dentition: Intact and Unchanged    Medication(s) Administered   Medication Administration Time: 12/15/2023 6:29 PM

## 2023-12-16 NOTE — PROVIDER NOTIFICATION
Notified MD at 1205 PM regarding  stump dressing changeDr .      Spoke with: Dr. Maguire, Vascular Surgery    Orders were obtained.    Comments: Pt had incontinent stool and outer ACE wrap over stump wound partially soiled.  Clarified with MD if they needed to change wrap or if bedside RN able to change it.  Dr. Maguire stated that ACE wrap ok to be removed by RN, clean skin and visualize that wound vac dressing remains intact, re-wrap with new ACE wrap.  Updated bedside RN, will change wrap now.

## 2023-12-16 NOTE — PLAN OF CARE
/87 (BP Location: Left arm)   Pulse 70   Temp 98.2  F (36.8  C) (Oral)   Resp 16   Wt 58.2 kg (128 lb 4.8 oz)   SpO2 98%   BMI 20.09 kg/m      Shift: 1299-6343  Isolation Status: contact for VRE   VS: stable on RA, afebrile  Neuro: Aox4  Behaviors: calm and sleeping  BG: None  Labs: am labs pending   Respiratory: WDL  Cardiac: WDL  Pain/Nausea: denies pain and nausea   PRN: non  Diet: regular diet  IV Access: R double lumen PICC, L PIV   Infusion(s): LR @ 100 , Heparin @ 1000  Lines/Drains: wound vac @ 75  GI/: voiding using the urinal . No BM for shift   Skin: wound vac over AKA, dressing changed in am   Mobility: assist of 1   Events/Education: na  Plan: Will continue following POC

## 2023-12-16 NOTE — PROGRESS NOTES
Vascular Surgery Progress Note    Mr. Burnham is in bright spirits this morning, enjoying a cookie in bed. Pain controlled. Tolerating regular diet. No issues with wound vac overnight.    BP (!) 149/88 (BP Location: Left arm)   Pulse 78   Temp 98.2  F (36.8  C) (Oral)   Resp 16   Wt 58.2 kg (128 lb 4.8 oz)   SpO2 99%   BMI 20.09 kg/m      24 h I/O:  UOP: 600 ml + 1x  Left AKA wound vac: 50 ml      Exam  Resting comfortably in bed, conversant  Nonlabored breathing on room air  Left above-knee amputation stump wound vac intact, scant serosang output in vac cannister. Skin is healthy, nonindurated, no erythema    Wbc 5.9  Hgb 6.4 from 7.3 pre-op  Plts 88    Na 136  K 4.7  Cr 1.84, at baseline    Intra-op cultures pending from 12/15/23, no organisms on gram stain      Assessment:  69 yo male with history of recent ruptured AAA repair with acute limb ischemia, left above knee amputation stump infection, now POD#1 s/p left AKA stump wound I&D with antibiotic beads and wound vac placement.  Recovering appropriately. Wound vac functioning well.    Plan:  -Continue current plan of care with multimodal pain control, advance diet as tolerated, and daptomycin  - Acute on chronic blood loss anemia. Transfuse 1 u RBCs, recheck hgb this evening. No clinical signs of bleeding.  - Heparin drip continued, home Eliquis held  - continue IV fluids for now, mild oliguria, Cr at baseline    Patient discussed with staff Karel Kincaid and Chrissy.    Ted Maguire MD

## 2023-12-16 NOTE — BRIEF OP NOTE
[facility]    Brief Operative Note    Pre-operative diagnosis: Infection following a procedure, deep incisional surgical site, initial encounter [T81.42XA]  Post-operative diagnosis Same as pre-operative diagnosis    Procedure: IRRIGATION AND DEBRIDEMENT OF LEFT AMPUTATION STUMP, WITH CEMENT ANTIBIOTIC BEAD INSERTION X3, Left - Leg    Surgeon: Surgeon(s) and Role:     * Boris Lowe - Primary  Anesthesia: General   Estimated Blood Loss: Less than 50 ml    Drains: Wound vac  Specimens:   ID Type Source Tests Collected by Time Destination   1 : left proximal bone Tissue Femur, Left SURGICAL PATHOLOGY EXAM Boris Lowe 12/15/2023  7:08 PM    A : left leg amputation site Wound Leg, left AFB CULTURE AND STAIN NON BLOOD, ANAEROBIC BACTERIAL CULTURE ROUTINE, GRAM STAIN, FUNGAL OR YEAST CULTURE ROUTINE, AEROBIC BACTERIAL CULTURE ROUTINE Boris Lowe 12/15/2023  6:55 PM    B : filemon-femur tissue Tissue Leg, left AFB CULTURE AND STAIN NON BLOOD, ANAEROBIC BACTERIAL CULTURE ROUTINE, GRAM STAIN, FUNGAL OR YEAST CULTURE ROUTINE, AEROBIC BACTERIAL CULTURE ROUTINE Boris Lowe 12/15/2023  6:56 PM    C : left distal bone Tissue Femur, Left AFB CULTURE AND STAIN NON BLOOD, ANAEROBIC BACTERIAL CULTURE ROUTINE, GRAM STAIN, FUNGAL OR YEAST CULTURE ROUTINE, AEROBIC BACTERIAL CULTURE ROUTINE Boris Lowe 12/15/2023  7:06 PM    D : left proximal bone Tissue Femur, Left AFB CULTURE AND STAIN NON BLOOD, ANAEROBIC BACTERIAL CULTURE ROUTINE, GRAM STAIN, FUNGAL OR YEAST CULTURE ROUTINE, AEROBIC BACTERIAL CULTURE ROUTINE Boris Loew 12/15/2023  7:09 PM      Findings:   Necrotic tissue, debrided .  Complications: None.  Implants:   Implant Name Type Inv. Item Serial No.  Lot No. LRB No. Used Action   IMP BONE CEMENT SIMPLEX W/GENTAMICIN 6195-1-001 - [id number] Cement, Bone IMP BONE CEMENT SIMPLEX W/GENTAMICIN 6195-1-001  [employer] [id number] Left 1 Implanted

## 2023-12-16 NOTE — PROGRESS NOTES
Vascular surgery paged to clarify heparin gtt, they DO want to resume. Called 7A to notify RN, RN who took the call said she would pass it off to Shamika.

## 2023-12-16 NOTE — PLAN OF CARE
Shift: 3730-7134    Patient left for OR @1645    VSS on room air. NPO since midnight. Heparin gtt infusing @1000 U/hr held for procedure. Denies pain/nausea. Voiding spontaneously without difficulty in urinal at bedside. R Double lumen PICC infusing LR @100 ml/hr. Assist x1 with walker. LLE AKA with wound vac @ 125ml/hr. Continue POC and notify team of changes

## 2023-12-17 LAB
ANION GAP SERPL CALCULATED.3IONS-SCNC: 12 MMOL/L (ref 7–15)
BACTERIA WND CULT: NO GROWTH
BUN SERPL-MCNC: 23.8 MG/DL (ref 8–23)
CALCIUM SERPL-MCNC: 9.2 MG/DL (ref 8.8–10.2)
CHLORIDE SERPL-SCNC: 107 MMOL/L (ref 98–107)
CREAT SERPL-MCNC: 1.78 MG/DL (ref 0.67–1.17)
DEPRECATED HCO3 PLAS-SCNC: 21 MMOL/L (ref 22–29)
EGFRCR SERPLBLD CKD-EPI 2021: 41 ML/MIN/1.73M2
ERYTHROCYTE [DISTWIDTH] IN BLOOD BY AUTOMATED COUNT: 16.5 % (ref 10–15)
GLUCOSE SERPL-MCNC: 95 MG/DL (ref 70–99)
HCT VFR BLD AUTO: 27.3 % (ref 40–53)
HGB BLD-MCNC: 8.6 G/DL (ref 13.3–17.7)
MCH RBC QN AUTO: 30.9 PG (ref 26.5–33)
MCHC RBC AUTO-ENTMCNC: 31.5 G/DL (ref 31.5–36.5)
MCV RBC AUTO: 98 FL (ref 78–100)
PLATELET # BLD AUTO: 98 10E3/UL (ref 150–450)
POTASSIUM SERPL-SCNC: 4.3 MMOL/L (ref 3.4–5.3)
RBC # BLD AUTO: 2.78 10E6/UL (ref 4.4–5.9)
SODIUM SERPL-SCNC: 140 MMOL/L (ref 135–145)
UFH PPP CHRO-ACNC: 0.19 IU/ML
UFH PPP CHRO-ACNC: 0.23 IU/ML
UFH PPP CHRO-ACNC: 0.3 IU/ML
WBC # BLD AUTO: 5.2 10E3/UL (ref 4–11)

## 2023-12-17 PROCEDURE — 80048 BASIC METABOLIC PNL TOTAL CA: CPT | Performed by: STUDENT IN AN ORGANIZED HEALTH CARE EDUCATION/TRAINING PROGRAM

## 2023-12-17 PROCEDURE — 36415 COLL VENOUS BLD VENIPUNCTURE: CPT | Performed by: SURGERY

## 2023-12-17 PROCEDURE — 120N000011 HC R&B TRANSPLANT UMMC

## 2023-12-17 PROCEDURE — 36415 COLL VENOUS BLD VENIPUNCTURE: CPT | Performed by: STUDENT IN AN ORGANIZED HEALTH CARE EDUCATION/TRAINING PROGRAM

## 2023-12-17 PROCEDURE — 85520 HEPARIN ASSAY: CPT | Performed by: SURGERY

## 2023-12-17 PROCEDURE — 85027 COMPLETE CBC AUTOMATED: CPT | Performed by: STUDENT IN AN ORGANIZED HEALTH CARE EDUCATION/TRAINING PROGRAM

## 2023-12-17 PROCEDURE — 250N000011 HC RX IP 250 OP 636: Mod: JZ | Performed by: SURGERY

## 2023-12-17 PROCEDURE — 250N000013 HC RX MED GY IP 250 OP 250 PS 637: Performed by: SURGERY

## 2023-12-17 RX ADMIN — ACETAMINOPHEN 975 MG: 325 TABLET, FILM COATED ORAL at 09:19

## 2023-12-17 RX ADMIN — PANTOPRAZOLE SODIUM 40 MG: 40 TABLET, DELAYED RELEASE ORAL at 17:50

## 2023-12-17 RX ADMIN — OXYCODONE HYDROCHLORIDE 5 MG: 5 TABLET ORAL at 21:52

## 2023-12-17 RX ADMIN — Medication 5 ML: at 17:51

## 2023-12-17 RX ADMIN — METOPROLOL TARTRATE 100 MG: 50 TABLET, FILM COATED ORAL at 19:51

## 2023-12-17 RX ADMIN — METOPROLOL TARTRATE 100 MG: 50 TABLET, FILM COATED ORAL at 09:21

## 2023-12-17 RX ADMIN — Medication 50 MCG: at 11:38

## 2023-12-17 RX ADMIN — ATORVASTATIN CALCIUM 10 MG: 10 TABLET, FILM COATED ORAL at 19:52

## 2023-12-17 RX ADMIN — QUETIAPINE FUMARATE 75 MG: 25 TABLET ORAL at 19:51

## 2023-12-17 RX ADMIN — AMLODIPINE BESYLATE 10 MG: 10 TABLET ORAL at 09:20

## 2023-12-17 RX ADMIN — ACETAMINOPHEN 975 MG: 325 TABLET, FILM COATED ORAL at 19:51

## 2023-12-17 RX ADMIN — MELATONIN 3 MG: at 19:51

## 2023-12-17 RX ADMIN — ACETAMINOPHEN 975 MG: 325 TABLET, FILM COATED ORAL at 14:33

## 2023-12-17 ASSESSMENT — ACTIVITIES OF DAILY LIVING (ADL)
ADLS_ACUITY_SCORE: 39

## 2023-12-17 NOTE — PROGRESS NOTES
Vascular Surgery Progress Note    Loose stools yesterday, resolved overnight. Hopefully for discharge in the next few days.    BP (!) 150/90 (BP Location: Left arm)   Pulse 83   Temp 98.4  F (36.9  C) (Oral)   Resp 18   Wt 58.2 kg (128 lb 4.8 oz)   SpO2 98%   BMI 20.09 kg/m      Resting comfortably in bed  Abdomen entirely nontender, soft  L AKA stump vac intact, no output, mild LLE edema    Hgb 7.9 from 6.4 after 1 u RBCs transfused yesterday, hgb now 8.6 this morning  Wbc 5.2  Plts 98  Na 140  K 4.3  Cr 1.78    Intra-op cx from 12/15/23 - NGTD      Assessment:  67 yo male with history of recent ruptured AAA repair with acute limb ischemia, left above knee amputation stump infection, now POD#2 s/p left AKA stump wound I&D with antibiotic beads and wound vac placement.  Recovering appropriately. Wound vac functioning well. Loose stools likely 2/2 abx and stool softeners.     Plan:  -Continue current plan of care with multimodal pain control, regular diet, and scheduled daptomycin  - Heparin drip continued, home Eliquis held  - mild oliguria resolved, Cr at baseline, stop IV fluids  - hold stool softeners for loose stools, continue fiber supplements  - wound vac change tomorrow and anticipate discharge to home in 1-2 days     Patient discussed with staff Karel Kincaid and Chrissy.     Ted Maguire MD

## 2023-12-17 NOTE — PLAN OF CARE
BP (!) 146/87 (BP Location: Left arm)   Pulse 73   Temp 98  F (36.7  C) (Oral)   Resp 18   Wt 58.2 kg (128 lb 4.8 oz)   SpO2 94%   BMI 20.09 kg/m      Shift: 7937-3936  Isolation Status: contact for VRE   VS: stable on RA, afebrile  Neuro: Aox4  Behaviors: calm and sleeping  BG: None  Labs: am labs pending   Respiratory: WDL  Cardiac: WDL  Pain/Nausea: denies pain and nausea   PRN: non  Diet: regular diet  IV Access: R double lumen PICC, L PIV   Infusion(s): LR @ 100 , Heparin @ 950  Lines/Drains: wound vac @ 75  GI/: voiding using the urinal . No BM for shift   Skin: wound vac over AKA, dressing changed in am   Mobility: assist of 1   Events/Education: na  Plan: Will continue following POC

## 2023-12-18 ENCOUNTER — APPOINTMENT (OUTPATIENT)
Dept: OCCUPATIONAL THERAPY | Facility: CLINIC | Age: 68
DRG: 464 | End: 2023-12-18
Attending: SURGERY
Payer: COMMERCIAL

## 2023-12-18 ENCOUNTER — APPOINTMENT (OUTPATIENT)
Dept: PHYSICAL THERAPY | Facility: CLINIC | Age: 68
DRG: 464 | End: 2023-12-18
Attending: SURGERY
Payer: COMMERCIAL

## 2023-12-18 DIAGNOSIS — Z89.612 S/P AKA (ABOVE KNEE AMPUTATION) UNILATERAL, LEFT (H): Primary | ICD-10-CM

## 2023-12-18 LAB
ANION GAP SERPL CALCULATED.3IONS-SCNC: 11 MMOL/L (ref 7–15)
BUN SERPL-MCNC: 24.6 MG/DL (ref 8–23)
CALCIUM SERPL-MCNC: 9.2 MG/DL (ref 8.8–10.2)
CHLORIDE SERPL-SCNC: 109 MMOL/L (ref 98–107)
CREAT SERPL-MCNC: 1.81 MG/DL (ref 0.67–1.17)
DEPRECATED HCO3 PLAS-SCNC: 20 MMOL/L (ref 22–29)
EGFRCR SERPLBLD CKD-EPI 2021: 40 ML/MIN/1.73M2
ERYTHROCYTE [DISTWIDTH] IN BLOOD BY AUTOMATED COUNT: 16.6 % (ref 10–15)
GLUCOSE SERPL-MCNC: 96 MG/DL (ref 70–99)
HCT VFR BLD AUTO: 26.3 % (ref 40–53)
HGB BLD-MCNC: 8.2 G/DL (ref 13.3–17.7)
MAGNESIUM SERPL-MCNC: 1.7 MG/DL (ref 1.7–2.3)
MCH RBC QN AUTO: 30.9 PG (ref 26.5–33)
MCHC RBC AUTO-ENTMCNC: 31.2 G/DL (ref 31.5–36.5)
MCV RBC AUTO: 99 FL (ref 78–100)
PHOSPHATE SERPL-MCNC: 4.7 MG/DL (ref 2.5–4.5)
PLATELET # BLD AUTO: 96 10E3/UL (ref 150–450)
POTASSIUM SERPL-SCNC: 4.1 MMOL/L (ref 3.4–5.3)
RBC # BLD AUTO: 2.65 10E6/UL (ref 4.4–5.9)
SODIUM SERPL-SCNC: 140 MMOL/L (ref 135–145)
UFH PPP CHRO-ACNC: 0.86 IU/ML
WBC # BLD AUTO: 5.8 10E3/UL (ref 4–11)

## 2023-12-18 PROCEDURE — 999N000044 HC STATISTIC CVC DRESSING CHANGE

## 2023-12-18 PROCEDURE — 250N000011 HC RX IP 250 OP 636: Mod: JZ | Performed by: SURGERY

## 2023-12-18 PROCEDURE — 84100 ASSAY OF PHOSPHORUS: CPT | Performed by: SURGERY

## 2023-12-18 PROCEDURE — 258N000003 HC RX IP 258 OP 636: Performed by: SURGERY

## 2023-12-18 PROCEDURE — 120N000011 HC R&B TRANSPLANT UMMC

## 2023-12-18 PROCEDURE — 250N000013 HC RX MED GY IP 250 OP 250 PS 637: Performed by: SURGERY

## 2023-12-18 PROCEDURE — 97530 THERAPEUTIC ACTIVITIES: CPT | Mod: GP

## 2023-12-18 PROCEDURE — 36592 COLLECT BLOOD FROM PICC: CPT | Performed by: SURGERY

## 2023-12-18 PROCEDURE — 85027 COMPLETE CBC AUTOMATED: CPT | Performed by: SURGERY

## 2023-12-18 PROCEDURE — 80048 BASIC METABOLIC PNL TOTAL CA: CPT | Performed by: SURGERY

## 2023-12-18 PROCEDURE — 99233 SBSQ HOSP IP/OBS HIGH 50: CPT | Mod: 24 | Performed by: INTERNAL MEDICINE

## 2023-12-18 PROCEDURE — 97535 SELF CARE MNGMENT TRAINING: CPT | Mod: GO

## 2023-12-18 PROCEDURE — 85520 HEPARIN ASSAY: CPT | Performed by: SURGERY

## 2023-12-18 PROCEDURE — 83735 ASSAY OF MAGNESIUM: CPT | Performed by: SURGERY

## 2023-12-18 PROCEDURE — 99024 POSTOP FOLLOW-UP VISIT: CPT | Performed by: NURSE PRACTITIONER

## 2023-12-18 PROCEDURE — 250N000013 HC RX MED GY IP 250 OP 250 PS 637: Performed by: NURSE PRACTITIONER

## 2023-12-18 RX ORDER — SENNOSIDES 8.6 MG
1 TABLET ORAL 2 TIMES DAILY PRN
Status: DISCONTINUED | OUTPATIENT
Start: 2023-12-18 | End: 2023-12-19 | Stop reason: HOSPADM

## 2023-12-18 RX ADMIN — APIXABAN 5 MG: 5 TABLET, FILM COATED ORAL at 19:58

## 2023-12-18 RX ADMIN — PSYLLIUM HUSK 1 PACKET: 3.4 POWDER ORAL at 08:40

## 2023-12-18 RX ADMIN — ACETAMINOPHEN 975 MG: 325 TABLET, FILM COATED ORAL at 08:39

## 2023-12-18 RX ADMIN — APIXABAN 5 MG: 5 TABLET, FILM COATED ORAL at 10:37

## 2023-12-18 RX ADMIN — Medication 50 MCG: at 13:06

## 2023-12-18 RX ADMIN — AMLODIPINE BESYLATE 10 MG: 10 TABLET ORAL at 08:39

## 2023-12-18 RX ADMIN — DAPTOMYCIN 700 MG: 500 INJECTION, POWDER, LYOPHILIZED, FOR SOLUTION INTRAVENOUS at 15:32

## 2023-12-18 RX ADMIN — METOPROLOL TARTRATE 100 MG: 50 TABLET, FILM COATED ORAL at 19:57

## 2023-12-18 RX ADMIN — ACETAMINOPHEN 975 MG: 325 TABLET, FILM COATED ORAL at 15:31

## 2023-12-18 RX ADMIN — METOPROLOL TARTRATE 100 MG: 50 TABLET, FILM COATED ORAL at 08:40

## 2023-12-18 RX ADMIN — ACETAMINOPHEN 975 MG: 325 TABLET, FILM COATED ORAL at 19:58

## 2023-12-18 RX ADMIN — PANTOPRAZOLE SODIUM 40 MG: 40 TABLET, DELAYED RELEASE ORAL at 18:58

## 2023-12-18 RX ADMIN — HEPARIN SODIUM AND DEXTROSE 1200 UNITS/HR: 10000; 5 INJECTION INTRAVENOUS at 03:15

## 2023-12-18 RX ADMIN — ATORVASTATIN CALCIUM 10 MG: 10 TABLET, FILM COATED ORAL at 19:58

## 2023-12-18 RX ADMIN — OXYCODONE HYDROCHLORIDE 5 MG: 5 TABLET ORAL at 19:58

## 2023-12-18 RX ADMIN — MELATONIN 3 MG: at 19:57

## 2023-12-18 RX ADMIN — QUETIAPINE FUMARATE 75 MG: 25 TABLET ORAL at 19:57

## 2023-12-18 RX ADMIN — Medication 10 ML: at 18:58

## 2023-12-18 ASSESSMENT — ACTIVITIES OF DAILY LIVING (ADL)
ADLS_ACUITY_SCORE: 39

## 2023-12-18 NOTE — PROGRESS NOTES
Vascular Surgery Progress Note  12/18/2023       Subjective:  Slept well. NAEO. Pending OR today for LLE wound wash and debridement.      Objective:  Temp:  [97.7  F (36.5  C)-98.6  F (37  C)] 98.2  F (36.8  C)  Pulse:  [66-87] 69  Resp:  [17-18] 18  BP: (120-160)/(78-97) 160/97  SpO2:  [96 %-100 %] 100 %    I/O last 3 completed shifts:  In: 480 [P.O.:480]  Out: 2000 [Urine:2000]      Gen: Awake, alert, NAD  CV: RRR per radial pulse  Resp: NLB on RA  Abd: soft, nondistended, nontender  Incision: abdominal incision healed.  LLE dressing VAC intact with appropriate suction. Wound is healing well (assessed on 12/18/23), without sloughing, no bleeding upon inspection, has granulation tissue. No purulent drainage noted. ACE wrap on at all times.   Ext: WWP, no edema.     LLE wound size, surgical incision 12/18/23:   Depth 5cm  Length 11cm  Width 4cm     Labs:  Recent Labs   Lab 12/18/23  0555 12/17/23  0543 12/16/23  1810 12/16/23  0916   WBC 5.8 5.2  --  5.9   HGB 8.2* 8.6* 7.9* 6.4*   PLT 96* 98*  --  88*       Recent Labs   Lab 12/18/23  0555 12/17/23  0543 12/16/23  0916 12/15/23  1107 12/14/23  0530    140 136  --  136   POTASSIUM 4.1 4.3 4.7  --  4.4   CHLORIDE 109* 107 109*  --  108*   CO2 20* 21* 18*  --  16*   BUN 24.6* 23.8* 26.6*  --  29.4*   CR 1.81* 1.78* 1.84*  --  1.95*   GLC 96 95 105*   < > 96   OMAR 9.2 9.2 8.7*  --  9.2   MAG 1.7  --   --   --  1.9   PHOS 4.7*  --   --   --  4.6*    < > = values in this interval not displayed.        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 Now growing VRE on wound cultures from 11/29. Given recent cultures, patient underwent first stage of stump revision on 12/8/2023 and VAC placement. Recovering well. Bone biopsy proximal culture with VRE. POD#3 s/p left AKA  stump wound I&D with antibiotic beads and wound vac placement, recovering well, planning for discharge to [facility] tomorrow    - Pain controlled  - PT/OT/SLP for recovery  - Resumed prior meds, including Eliquis, stopped heparin gtt  - ID following given VRE, daptomycin - anticipating 6 week course. Picc placed 12/4/23.  - Heparin drip low intensity. To hold on call to OR.  - VAC on LLE M/W/F changes (changed 12/18/23). For discharge, wound contains white antibiotic beads. DO NOT REMOVE. Only black sponge is to be replaced, do not insert additional white sponges or any other material in wound, do not remove the current white antibiotic beads. Gentle measurement of current open wound, do not dig in looking for tunneling.    SW engaged for discharge planning.     LLE wound size, surgical incision as of 12/18/23:   Depth 5cm  Length 11cm  Width 4cm     Discussed pt history, exam, assessment and plan with Dr. Lowe of the vascular surgery service, who is in agreement with the above.    Miryam Carrasco, SACHIN  Vascular Surgery  Pager: [phone]  [email or web address]  Send message or 10 digit call back number Securely via [employer] with the [employer] Web Console (learn more here)

## 2023-12-18 NOTE — PROGRESS NOTES
S: [facility] with wife and reports that he has his AK protector and supplies at home. His wife reports that they were over at [facility] and were re-admitted to the [facility] and left the protector and supplies but that she went over there and picked them up and brought them home since he would be discharged to home and not back to rehab. O: I hear her reports. A: I explained that I had received an EPIC message to get in touch with them since he has had an AKA revision and may need another protector and supplies. Since he has the correct circumference size for the existing protector there is not a different one for that circumference. ( They are all the same with regards to length.) They did not want another one since they have one already at home. P: FU PRN. [facility].  Electronically signed John Paul Bae CPO, LPO.

## 2023-12-18 NOTE — PROGRESS NOTES
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 Likely plan for another debridement.    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 Last debridement 12/15/23 with gent beads placed, wound vac in place. Plan for discharge to home and return for eventual wound closure. Total duration of antibiotic is 6 weeks dated from final debridement of stump (12/15)    Recommendations:   Continue IV daptomycin equivalent of 12mg/kg daily, renally dosed (currently 700 mg Q48H), 12/15/23-1/26/24. Could considering transitioning to linezolid later in course  Discussed muscle aches as side effect of high dose daptomycin   See below for details.     Primary team:  Place order panel  OPAT Pharmacy ([facility]) Review and ID Care Transition   Place imaging order(s) requested above prior to discharge.  Contact ID teams about missed ID clinic appointments and/or ongoing therapy needs.    Prolonged Parenteral/Oral Antibiotic  "Recommendations and ID Follow up  This template provides final ID recommendations as of this date.     Infectious Diseases Indication: VRE osteomyelitis    Antibiotic Information  Name of Antibiotic Dose of Antibiotic1 Anticipated duration Effective start date2 End date   Daptomycin 12 mg/kg/day (renally dosed) 6 weeks 12/15/23 1/26/24    Current dose is 700 mg Q48H but if renal function improves, may need to change to Q24H.              1.Dose of antibiotic will need to be renally adjusted if creatinine clearance changes  2.Effective start date is the date of adequate therapy with appropriate spectrum    Method of antibiotic delivery:PICC line.Is the line being used for another indication besides antimicrobials? No At the end of therapy should the line used for antimicrobials be removed or de-accessed? Yes. Selecting \"yes\" will function as written order to remove PICC line or de-access the indwelling line at the end of therapy.    Weekly labs required: CBC with diff, BMP, and CK. Dr. Tellez will follow labs at discharge until ID follow up. Fax labs to ID clinic.    Are there pending microbial tests: Yes Cultures     Imaging for ID follow up: ID Imaging: No.     We will attempt to make OPAT appointments within 2 weeks of discharge based on clinic availability.  Provider: Jose, timing of visit before this specific date 1/26/24 , and the type of clinic appointment visit Okay for in person or a virtual visit.     ID provider route note: OPAT RN Care Coordinator [facility] ID Clinic Information:  Phone: [phone]  Fax: [phone] (Attention Guerrero Tanner)     Tanika Tellez MD  Infectious Diseases  Pager [phone] or Vocera         Interval History and Events:     Seen and evaluated at bedside, accompanied by his wife. No new complaints today. Discussed about antibiotic plan once discharged home next week, and then will be re-admitted at a later date.       ID History:   68 " yo M with recent prolonged hospitalization for AAA s/p open repair c/b shock, colonic and LLE ischemia s/p AKA. Eventually discharged to ARU, relatively doing well until 11/29, when noticed malodorous drainage from lateral side of stump incision, when malfunction of wound vac. Afebrile, hemodynamically stable. Pre-op cultures grew VRE, susceptibility reviewed (Daptomycin DSS (EVELIO 3), S- linezolid, tigecycline). Underwent I&D by vascular surgery. Intra-op cultures are obtained, also growing VRE pending susceptibility. Reviewed OP findings, and per discussion with primary team, debridement up to bone level. Based on this information, anticipate at least 4-6 weeks of antibiotic course. Started iv Daptomycin, and based on susceptibility data, at 12mg/kg/day (renally dosed). Other antibiotic options are limited, and would like to avoid at this time due to side effects profile (linezolid would cause cytopenia, and tigecycline with nausea/vomiting) while treating for 6 weeks course.          Antimicrobial history:     Daptomycin 12 mg/kg/day (renally dosed) 12/2 - present         Physical Examination:   Temp:  [97.8  F (36.6  C)-98.6  F (37  C)] 97.8  F (36.6  C)  Pulse:  [66-87] 77  Resp:  [16-18] 16  BP: (120-160)/(60-97) 143/83  SpO2:  [96 %-100 %] 98 %    Exam:   GENERAL:  Well-developed, well-nourished, supine, in no acute distress.   Head: normocephalic, atraumatic.   EYES: PERRLA, EOMI, conjunctiva clear, anicteric sclerae.    ENT:  Oropharynx is moist without exudates or ulcers.  NECK:  Supple.  LUNGS:  Normal respiratory effort   EXT: Left AKA stump - covered in surgical dressing and +wound vac  SKIN:  No acute rashes.    NEUROLOGIC:  Grossly nonfocal.      Lines and devices:   PIV is in place without any surrounding erythema.     Labs, Microbiology and Imaging studies are reviewed.   12/8 intra-op cultures from tissue, Enterococcus faecium VRE, Staph epidermidis, proximal bone with VRE and from distal bone -  NGTD  12/1 intra-op cxs VRE  11/29 wound cxs VRE, Daptomycin DSS (EVELIO 3), S- linezolid, tigecycline  11/29 blood cultures NGTD         Laboratory Data:     Hematology Studies    Recent Labs   Lab Test 12/18/23  0555 12/17/23  0543 12/16/23  1810 12/16/23  0916 12/14/23  0530 12/13/23  0617 12/11/23  0610 10/27/23  1255 10/27/23  0548 10/26/23  1051 10/26/23  0700 10/24/23  0756 10/24/23  0651 10/23/23  0719 10/23/23  0555 10/22/23  1309 10/22/23  0422 10/21/23  0354   WBC 5.8 5.2  --  5.9 5.9 6.2 5.8   < > 11.5*   < > 13.2*   < > 12.4*  --  12.6*  --  11.3* 13.9*   ANEU  --   --   --   --   --   --   --   --  8.1  --  9.4*  --  9.5*  --  9.3*  --  8.4* 9.3*   AEOS  --   --   --   --   --   --   --   --  3.0*  --  2.4*  --  1.9*  --  2.4*  --  2.0* 1.8*   HGB 8.2* 8.6* 7.9* 6.4* 7.3* 7.4* 7.7*   < > 8.4*   < > 8.4*   < > 5.8*   < > 6.8*   < > 7.1* 8.0*   MCV 99 98  --  99 98 96 99   < > 94   < > 96   < > 95  --  96  --  94 90   PLT 96* 98*  --  88* 104* 109* 93*   < > 124*   < > 128*   < > 142*  --  185  --  167 159    < > = values in this interval not displayed.       Metabolic Studies     Recent Labs   Lab Test 12/18/23  0555 12/17/23  0543 12/16/23  0916 12/14/23  0530 12/11/23  0610    140 136 136 136   POTASSIUM 4.1 4.3 4.7 4.4 4.2   CHLORIDE 109* 107 109* 108* 108*   CO2 20* 21* 18* 16* 18*   BUN 24.6* 23.8* 26.6* 29.4* 32.8*   CR 1.81* 1.78* 1.84* 1.95* 2.05*   GFRESTIMATED 40* 41* 39* 36* 34*       Hepatic Studies    Recent Labs   Lab Test 12/08/23  0732 10/30/23  0651 10/29/23  0401 10/28/23  0525 10/27/23  0548 10/26/23  0700   BILITOTAL 0.3 0.5 0.4 0.5 0.4 0.4   ALKPHOS 71 132* 128 133* 126 111   ALBUMIN 3.5 3.1* 3.0* 3.1* 3.0* 2.8*   AST 18 24 25 34 38 52*   ALT 16 15 19 25 26 20       Urine Studies    Recent Labs   Lab Test 10/01/23  1049 09/30/23  1029 09/30/23  0648   LEUKEST Negative Negative Negative   WBCU 7* 7* 12*       Last check of C difficile  C Difficile Toxin B by PCR   Date Value Ref  Range Status   11/06/2023 Negative Negative Final     Comment:     A negative result does not exclude actual disease due to C. difficile and may be due to improper collection, handling and storage of the specimen or the number of organisms in the specimen is below the detection limit of the assay.       Hepatitis B Testing   Recent Labs   Lab Test 10/27/23  1255 10/06/23  1543   HEPBANG Nonreactive Nonreactive         Microbiology:    Lab Results   Component Value Date    CULTURE No anaerobic organisms isolated after 2 days 12/15/2023    CULTURE No growth after 2 days 12/15/2023    CULTURE No growth after 2 days 12/15/2023

## 2023-12-18 NOTE — PROGRESS NOTES
CLINICAL NUTRITION SERVICES - REASSESSMENT NOTE     Nutrition Prescription      Malnutrition Status:    Severe malnutrition in the context of acute illness    Recommendations already ordered by Registered Dietitian (RD):  Adjust oral supplements per patient's preference    Future/Additional Recommendations:  Recommend monitoring weight trends, wound healing to help determine if oral intake/nutritional status is adequate going forward.     Continue vitamin D supplementation, consider re-checking level in 2-3 months.      EVALUATION OF THE PROGRESS TOWARD GOALS   Diet: Moderate Consistent Carbohydrate + Ensure petros with lunch + Magic Cup petros with breakfast/dinner meals    Intake: In general, eating % of meals with fair appetite/tolerance noted.  Pt feels his intake is improved.      NEW FINDINGS   Labs: Phos 4.7 (elevated), fecal elastase 358 (normal, 11/13/23), Vit D 18 (low, 11/7/23)    Weight: No recent weight to assess for changes    Meds: Colace, Protonix, Psyllium, Vitamin D3 50 mcg    Other: OR today for LLE wound wash and debridement     MALNUTRITION  % Intake: < 75% for > 7 days (moderate)  % Weight Loss: Unable to assess  Subcutaneous Fat Loss: Facial region:  moderate and Thoracic/intercostal:  moderate  Muscle Loss: Facial & jaw region:  moderate and Thoracic region (clavicle, acromium bone, deltoid, trapezius, pectoral):  moderate  Fluid Accumulation/Edema: None noted  Malnutrition Diagnosis: Severe malnutrition in the context of acute illness    Previous Goals   Total avg nutritional intake to meet a minimum of 30 kcal/kg and 1.2 g PRO/kg daily (per dosing wt 59.3 kg).   Evaluation: Not met    Previous Nutrition Diagnosis  Malnutrition   Evaluation: No change    CURRENT NUTRITION DIAGNOSIS  Increased nutrient needs related to recent surgeries/acute illness as evidenced by pt requiring ~30 kcal/kg and 1.2g/kg protein daily for repletion, wound healing.     INTERVENTIONS  Implementation  Nutrition  education for recommended modifications - reviewed high protein foods, sources of nutritionally dense foods for meals/snacks.  Discussed oral supplement options at availability in outpatient setting.     Goals  Patient to consume % of nutritionally adequate meal trays TID, or the equivalent with supplements/snacks.    Monitoring/Evaluation  Progress toward goals will be monitored and evaluated per protocol.    Leah Dallas, MS, RD, LD, CCTD, CNSC  7A and 7B beds [phone], pager [phone]  Weekend pager [phone]

## 2023-12-18 NOTE — PROGRESS NOTES
Care Management Follow Up    Length of Stay (days): 17    Expected Discharge Date: 12/19/2023     Concerns to be Addressed: discharge planning     Patient plan of care discussed at interdisciplinary rounds: Yes    Anticipated Discharge Disposition: Home, Home Care, Home Infusion     Anticipated Discharge Services: [facility]  and [facility]  Anticipated Discharge DME: None    Patient/family educated on Medicare website which has current facility and service quality ratings: no  Education Provided on the Discharge Plan: Yes  Patient/Family in Agreement with the Plan: yes    Referrals Placed by CM/SW: External Care Coordination, Homecare, Home Infusion  Private pay costs discussed: Not applicable    Additional Information:    Patient's status reviewed by chart. Vascular NP reached out and informed that the pt will be discharged tomorrow and [facility] with [facility]. Pt already had wound vac approval via [employer] previously.    Writer called Dia from [employer] and verified, all good. Writer called [phone] to have [employer] delivered from the [facility]. Writer obtained the signed agreement form and faxed plus emailed back to Dia @ [employer]. NP Miryam Carrasco (available on [employer]) will swap out the wound vac prior to discharge tomorrow.    Writer informed [facility] LSN about discharge tomorrow, Jia from [facility] acknowledged the discharge update and she will plan with the pt/family for teaching. Will need discharge order early in the morning so they arrange for teaching and prepping medication.    [facility] and informed about discharge tomorrow. They are aware and will review discharge order via [employer] tomorrow. No need to fax discharge order. No need to follow up with [facility]. Please make sure provider put in home care order for RN/PT/OT.    Writer ALLISON for [facility] coordinator services [phone], updating tomorrow discharge.    The manual WC already delivered. Tabby will  the walker later today from [facility].    Writer updated  discharge planning with the pt and wife Tabby. Tabby will provide a discharge ride tomorrow.    Continue to monitor    Outpatient Services:    Home infusion for home IVAB/supplies  [facility]  P: [phone]  F: [phone]     Home Care for RN/PT/OT services  [facility]   P: [phone]  F: [phone]      Eun Adan RN  7C RN Care Coordinator  Phone: [phone]  Pager: [phone]  [place] [facility] ([phone]) Saturday & Sunday; ([phone]) [facility] Recognized Holidays   Units: 5A, 5B & 5C  Pager: [phone]  Units: 6B, 6C & 6D    Pager: [phone]  Units: 7A, 7B, 7C & 7D    Pager: [phone]  Units: 6A & ICU   Pager: [phone]  Units: 5 Ortho, 5MS & WB ED Pager: [phone]  Units: 6MS, 8A & 10 ICU  Pager [phone]

## 2023-12-18 NOTE — PLAN OF CARE
BP (!) 134/93 (BP Location: Left arm)   Pulse 66   Temp 98.2  F (36.8  C) (Oral)   Resp 17   Wt 58.2 kg (128 lb 4.8 oz)   SpO2 98%   BMI 20.09 kg/m      Shift: 4527-0215  Isolation Status: contact for VRE   VS: stable on RA, afebrile  Neuro: Aox4  Behaviors: calm and sleeping  BG: None  Labs: am labs pending   Respiratory: WDL  Cardiac: WDL  Pain/Nausea: denies pain and nausea   PRN: non  Diet: regular diet  IV Access: R double lumen PICC, L PIV   Infusion(s): Heparin @ 1200  Lines/Drains: wound vac @ 75  GI/: voiding using the urinal . No BM for shift   Skin: wound vac over AKA, dressing changed in am   Mobility: assist of 1   Events/Education: na  Plan: Will continue following POC

## 2023-12-18 NOTE — PLAN OF CARE
/83 (BP Location: Left arm)   Pulse 87   Temp 98.6  F (37  C) (Oral)   Resp 18   Wt 58.2 kg (128 lb 4.8 oz)   SpO2 96%   BMI 20.09 kg/m      Care from: 0700 -1930     VS & Pain: hypertensive (within range). Rates stump pain @ 6 or 5, managed with schedule tylenol- partially effective   Neuro: AxO x4  Respiratory: denies shortness of breath   Cardiac: no acute distress noted  Peripheral neurovascular: denies numbness & tingling   GI/: + bowel sounds. No BM this shift. Pt reported last BM yesterday (4), provider held senna & colace. Voids spontaneously in urinal   Nutrition: moderate consistent carb. Denies nausea & vomiting   Skin: no new skin issues noted   Musculoskeletal: moderately impaired. L. AKA   Lines: L. PIV- SL. R. DL PICC- red infusing heparin @ 1,200 units/hr & purple- SL. Next 10a recheck @ 2155  Activity: Ax1 with gait belt & walker      Plan: continue plan of care      Goal Outcome Evaluation:  Plan of Care Reviewed With: patient, spouse  Overall Patient Progress: improving

## 2023-12-18 NOTE — DISCHARGE INSTRUCTIONS
Peripherally Inserted Central Catheter (PICC): Care Instructions  Overview     A peripherally inserted central catheter (PICC) is a thin, flexible tube that's used to give medicine, blood products, nutrients, or fluids. One end is put through the skin into a vein in the arm and moved into a large vein near your heart. The other end stays outside your body. It is a type of central vascular access device, or central line. You may have it for weeks or months.  A PICC can be more comfortable for you because medicines and other fluids go directly into the catheter. So you will not be stuck with a needle every time. A PICC may be used to draw blood for tests only if another vein, such as in the hand or arm, can't be used. The end of the PICC sometimes has two or three openings so that you can get more than one type of fluid or medicine at a time.  Your doctor may give you medicine to make you feel relaxed. You may feel a little pain when your doctor numbs your arm. Your doctor will then thread the catheter up a vein in your arm to a larger vein. You will not feel any pain. The doctor may use stitches or other devices to hold the catheter in place where it exits your arm.  After the procedure, the site may be sore for a day or two. You may have a large bandage or other covering to help keep the PICC clean and in place.  Follow-up care is a key part of your treatment and safety. Be sure to make and go to all appointments, and call your doctor if you are having problems. It's also a good idea to know your test results and keep a list of the medicines you take.  How can you care for yourself at home?  Be careful wearing jewelry, such as necklaces, that can catch on the catheter.  If the catheter breaks, follow the instructions your doctor gave you. If you have no instructions, clamp or tie off the catheter. Then see a doctor as soon as possible.  To help prevent infection, take a shower instead of a bath. Do not go swimming  with the catheter.  Try to keep the area dry. When you shower, cover the area with waterproof material, such as plastic wrap.  Never touch the open end of the catheter if the cap is off.  Never use scissors, knives, pins, or other sharp objects near the catheter or other tubing.  If your catheter has a clamp, keep it clamped when you are not using it.  Fasten or tape the catheter to your body to prevent pulling or dangling.  Avoid clothing that rubs or pulls on your catheter.  Avoid bending or crimping your catheter.  Always wash your hands before you touch your catheter.  Wear loose clothing over the catheter for the first 10 to 14 days. When getting dressed, be careful not to pull on the catheter.  How to change the dressing  Since the PICC is in one of your arms, you won't be able to change the dressing on your own. You'll need someone to help you change the dressing using the same instructions that your doctor or nurse gave you.  Your PICC dressing should be changed at least once a week. If the dressing gets loose, wet, or dirty, it must be changed more often to prevent infection. Your doctor may also give you instructions for when to change the dressing.  Be sure you have all your supplies ready. These include medical tape, a surgical mask, sterile gloves, and your dressing kit. The names and brands of the items will vary. Your doctor or nurse may give you specific instructions for changing the dressing.  Here are basic tips for how to change the dressing.  Wash your hands with soap and water.   Do this for 15 seconds. Dry them with paper towels.  Put on the surgical mask.  Loosen and remove your old dressing.   Peel it toward the PICC, not away from it. You may need to use an adhesive remover if it doesn't come off easily.  Look at the site carefully for redness, swelling, drainage, tenderness, or warmth.   If you notice any of these, call your doctor.  Wash your hands again.  Open your dressing kit, and put  "on the sterile gloves.  Clean the site with the supplies in the dressing kit.  Use the dressing that your doctor gave you, and place it over the site.  Tape the PICC tubing to your skin.   Make sure it doesn't dangle or pull.  When should you call for help?  Call 911 anytime you think you may need emergency care. For example, call if:    You passed out (lost consciousness).     You have severe trouble breathing.     You have sudden chest pain and shortness of breath, or you cough up blood.     You have a fast or uneven pulse.   Call your doctor now or seek immediate medical care if:    You have signs of infection, such as:  Increased pain, swelling, warmth, or redness.  Red streaks leading from the area.  Pus or blood draining from the area.  A fever.     You have swelling in your face, chest, neck, or arm on the side where the catheter is.     You have signs of a blood clot, such as bulging veins near the catheter.     Your catheter is leaking, cracked, or clogged.     You feel resistance when you inject medicine or fluids into your catheter.     Your catheter is out of place. This may happen after severe coughing or vomiting, or if you pull on the catheter.   Watch closely for changes in your health, and be sure to contact your doctor if:    You have any concerns about your catheter.   Where can you learn more?  Go to https://[email or web address] [email or web address].net/patiented  Enter L935 in the search box to learn more about \"Peripherally Inserted Central Catheter (PICC): Care Instructions.\"  Current as of: December 18, 2022               Content Version: 13.8    7122-2148 [employer].   Care instructions adapted under license by your healthcare professional. If you have questions about a medical condition or this instruction, always ask your healthcare professional. [employer] disclaims any warranty or liability for your use of this information.    General Discharge Instructions    Diet  - You may return to " your regular diet. We always encourage taking fiber as well as staying well-hydrated.   - Be sure to drink plenty of fluids.     Activity  - Per PT and OT recommendations    Wound Care  VAC changes Monday, Wednesdays, Fridays.     VAC instructions:  VAC on LLE M/W/F changes (changed 12/18/23). Negative pressure 125mmHg.   For discharge, wound contains white antibiotic beads. DO NOT REMOVE. Only black sponge is to be replaced, do not insert additional white sponges or any other material in wound, do not remove the current white antibiotic beads. Gentle measurement of current open wound, do not dig in looking for tunneling.    Call Vascular surgery [facility] for:  - Temperature > 101F, chills, rigors, dizziness  - Redness around or purulent drainage from wound  - Inability to tolerate diet, nausea or vomiting  - You stop passing gas, develop significant bloating, abdominal pain  - Have blood in stools/vomit  - Have severe diarrhea/constipation  - Any other questions or concerns.    Medication Instructions  Most of your medications may have changed. Please take only prescribed and resumed medications.     Follow up:    PCP:  Follow up with PCP in 5-7 days for post-hospitalization follow up. You may call to set this up - most clinics will be able to get you an appointment within the week if you let them know you were recently hospitalized and need to see your PCP.     Vascular:    We will schedule follow up with you for 12/26/23, out team will call to schedule this.     With general vascular surgery questions, concerns, or to request/change an appointment, please call:      Vascular Call Center  [phone]    To contact someone after 5 pm, on a weekend, or on a Holiday, please call:  [facility]  [phone], option 4 to have the Attending Physician for Vascular Surgery Service paged.              Prolonged Parenteral/Oral Antibiotic Recommendations and ID Follow up  This template provides final  "ID recommendations as of this date.      Infectious Diseases Indication: VRE osteomyelitis     Antibiotic Information  Name of Antibiotic Dose of Antibiotic1 Anticipated duration Effective start date2 End date   Daptomycin 12 mg/kg/day (renally dosed) 6 weeks 12/15/23 1/26/24     Current dose is 700 mg Q48H but if renal function improves, may need to change to Q24H.                      1.Dose of antibiotic will need to be renally adjusted if creatinine clearance changes  2.Effective start date is the date of adequate therapy with appropriate spectrum     Method of antibiotic delivery:PICC line.Is the line being used for another indication besides antimicrobials? No At the end of therapy should the line used for antimicrobials be removed or de-accessed? Yes. Selecting \"yes\" will function as written order to remove PICC line or de-access the indwelling line at the end of therapy.     Weekly labs required: CBC with diff, BMP, and CK. Dr. Tellez will follow labs at discharge until ID follow up. Fax labs to ID clinic.     Are there pending microbial tests: Yes Cultures      Imaging for ID follow up: ID Imaging: No.      We will attempt to make OPAT appointments within 2 weeks of discharge based on clinic availability.  Provider: Jose, timing of visit before this specific date 1/26/24 , and the type of clinic appointment visit Okay for in person or a virtual visit.      ID provider route note: OPAT RN Care Coordinator [facility] ID Clinic Information:  Phone: [phone]  Fax: [phone] (Andrew Storm         [facility] - IV Antibiotics  Phone  [phone]      [facility]    Services Available   Home Health Services       Address   [address], [address]  [facility] [address]             Contact Information    [phone]                             "

## 2023-12-19 ENCOUNTER — TELEPHONE (OUTPATIENT)
Dept: VASCULAR SURGERY | Facility: CLINIC | Age: 68
End: 2023-12-19
Payer: COMMERCIAL

## 2023-12-19 VITALS
DIASTOLIC BLOOD PRESSURE: 90 MMHG | SYSTOLIC BLOOD PRESSURE: 151 MMHG | HEART RATE: 83 BPM | OXYGEN SATURATION: 100 % | BODY MASS INDEX: 20.09 KG/M2 | TEMPERATURE: 98 F | RESPIRATION RATE: 16 BRPM | WEIGHT: 128.3 LBS

## 2023-12-19 PROCEDURE — 250N000013 HC RX MED GY IP 250 OP 250 PS 637: Performed by: SURGERY

## 2023-12-19 PROCEDURE — 250N000013 HC RX MED GY IP 250 OP 250 PS 637: Performed by: NURSE PRACTITIONER

## 2023-12-19 PROCEDURE — 99024 POSTOP FOLLOW-UP VISIT: CPT | Performed by: NURSE PRACTITIONER

## 2023-12-19 RX ORDER — AMLODIPINE BESYLATE 10 MG/1
10 TABLET ORAL DAILY
Qty: 90 TABLET | Refills: 3 | Status: SHIPPED | OUTPATIENT
Start: 2023-12-19 | End: 2024-12-13

## 2023-12-19 RX ORDER — ACETAMINOPHEN 325 MG/1
975 TABLET ORAL EVERY 8 HOURS PRN
COMMUNITY
Start: 2023-12-19 | End: 2024-01-02

## 2023-12-19 RX ORDER — METOPROLOL TARTRATE 100 MG
100 TABLET ORAL 2 TIMES DAILY
Qty: 180 TABLET | Refills: 3 | Status: SHIPPED | OUTPATIENT
Start: 2023-12-19 | End: 2024-12-13

## 2023-12-19 RX ORDER — PANTOPRAZOLE SODIUM 40 MG/1
40 TABLET, DELAYED RELEASE ORAL EVERY 24 HOURS
Qty: 90 TABLET | Refills: 3 | Status: SHIPPED | OUTPATIENT
Start: 2023-12-19 | End: 2024-12-13

## 2023-12-19 RX ORDER — LANOLIN ALCOHOL/MO/W.PET/CERES
3 CREAM (GRAM) TOPICAL EVERY EVENING
Qty: 30 TABLET | Refills: 0 | Status: SHIPPED | OUTPATIENT
Start: 2023-12-19 | End: 2024-01-18

## 2023-12-19 RX ORDER — POLYETHYLENE GLYCOL 3350 17 G/17G
17 POWDER, FOR SOLUTION ORAL DAILY PRN
Qty: 340 G | Refills: 0 | Status: SHIPPED | OUTPATIENT
Start: 2023-12-19 | End: 2024-01-08

## 2023-12-19 RX ORDER — SENNOSIDES 8.6 MG
1 TABLET ORAL 2 TIMES DAILY PRN
Qty: 40 TABLET | Refills: 3 | Status: ON HOLD | OUTPATIENT
Start: 2023-12-19 | End: 2024-02-05

## 2023-12-19 RX ORDER — QUETIAPINE FUMARATE 25 MG/1
25 TABLET, FILM COATED ORAL 2 TIMES DAILY PRN
Qty: 180 TABLET | Refills: 3 | Status: SHIPPED | OUTPATIENT
Start: 2023-12-19 | End: 2024-12-13

## 2023-12-19 RX ORDER — QUETIAPINE FUMARATE 25 MG/1
75 TABLET, FILM COATED ORAL AT BEDTIME
Qty: 270 TABLET | Refills: 3 | Status: SHIPPED | OUTPATIENT
Start: 2023-12-19 | End: 2024-12-13

## 2023-12-19 RX ORDER — VITAMIN B COMPLEX
50 TABLET ORAL EVERY 24 HOURS
Qty: 180 TABLET | Refills: 3 | Status: SHIPPED | OUTPATIENT
Start: 2023-12-19 | End: 2024-12-13

## 2023-12-19 RX ADMIN — METOPROLOL TARTRATE 100 MG: 50 TABLET, FILM COATED ORAL at 08:07

## 2023-12-19 RX ADMIN — AMLODIPINE BESYLATE 10 MG: 10 TABLET ORAL at 08:07

## 2023-12-19 RX ADMIN — ACETAMINOPHEN 975 MG: 325 TABLET, FILM COATED ORAL at 08:07

## 2023-12-19 RX ADMIN — APIXABAN 5 MG: 5 TABLET, FILM COATED ORAL at 08:08

## 2023-12-19 ASSESSMENT — ACTIVITIES OF DAILY LIVING (ADL)
ADLS_ACUITY_SCORE: 38

## 2023-12-19 NOTE — PROGRESS NOTES
Vascular Surgery Progress Note  12/19/2023       Subjective:  Slept well. NAEO.      Objective:  Temp:  [97.8  F (36.6  C)-98.3  F (36.8  C)] 98  F (36.7  C)  Pulse:  [67-79] 74  Resp:  [16-18] 16  BP: (123-155)/(60-94) 142/94  SpO2:  [97 %-100 %] 100 %    I/O last 3 completed shifts:  In: 580 [P.O.:480; I.V.:100]  Out: 2100 [Urine:2100]      Gen: Awake, alert, NAD  CV: RRR per radial pulse  Resp: NLB on RA  Abd: soft, nondistended, nontender  Incision: abdominal incision healed.  LLE dressing VAC intact with appropriate suction. Wound is healing well (assessed on 12/18/23), without sloughing, no bleeding upon inspection, has granulation tissue. No purulent drainage noted. ACE wrap on at all times.   Ext: WWP, no edema.     LLE wound size, surgical incision 12/18/23:   Depth 5cm  Length 11cm  Width 4cm     Labs:  Recent Labs   Lab 12/18/23  0555 12/17/23  0543 12/16/23  1810 12/16/23  0916   WBC 5.8 5.2  --  5.9   HGB 8.2* 8.6* 7.9* 6.4*   PLT 96* 98*  --  88*       Recent Labs   Lab 12/18/23  0555 12/17/23  0543 12/16/23  0916 12/15/23  1107 12/14/23  0530    140 136  --  136   POTASSIUM 4.1 4.3 4.7  --  4.4   CHLORIDE 109* 107 109*  --  108*   CO2 20* 21* 18*  --  16*   BUN 24.6* 23.8* 26.6*  --  29.4*   CR 1.81* 1.78* 1.84*  --  1.95*   GLC 96 95 105*   < > 96   OMAR 9.2 9.2 8.7*  --  9.2   MAG 1.7  --   --   --  1.9   PHOS 4.7*  --   --   --  4.6*    < > = values in this interval not displayed.        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 Now growing VRE on wound cultures from 11/29. Given recent cultures, patient underwent first stage of stump revision on 12/8/2023 and VAC placement. Recovering well. Bone biopsy proximal culture with VRE. POD#3 s/p left AKA stump wound I&D with antibiotic beads and  wound vac placement, recovering well, planning for discharge to [facility] tomorrow    - Pain controlled  - PT/OT/SLP  - Resumed prior meds, including Eliquis x 90days  - ID following given VRE, daptomycin - anticipating 6 week course. Picc placed 12/4/23.  - VAC on LLE M/W/F changes (changed 12/18/23). For discharge, wound contains white antibiotic beads. DO NOT REMOVE. Only black sponge is to be replaced, do not insert additional white sponges or any other material in wound, do not remove the current white antibiotic beads. Gentle measurement of current open wound, do not dig in looking for tunneling.    LLE wound size, surgical incision as of 12/18/23:   Depth 5cm  Length 11cm  Width 4cm     Discharge to home with services today.    Discussed pt history, exam, assessment and plan with Dr. Lowe of the vascular surgery service, who is in agreement with the above.    Miryam Carrasco, [facility]  Vascular Surgery  Pager: [phone]  [email or web address]  Send message or 10 digit call back number Securely via [employer] with the [employer] Web Console (learn more here)

## 2023-12-19 NOTE — PLAN OF CARE
BP (!) 155/92   Pulse 76   Temp 97.8  F (36.6  C) (Oral)   Resp 16   Wt 58.2 kg (128 lb 4.8 oz)   SpO2 98%   BMI 20.09 kg/m      Shift: 9976-2404  Isolation Status: contact   VS: systolic 150's  on RA, afebrile  Neuro: Aox4  Behaviors: none   BG: none   Labs: pending AM   Respiratory: RA   Cardiac: WNL  Pain/Nausea: stated 6/10 surgical pain   PRN: Oxycodone   Diet: consistent carb   IV Access: DL PICC hep lock   Infusion(s): none   Lines/Drains: none   GI/: continent of bowel and bladder   Skin: has wound Vac in place and intact and left AKA is CDI.   Mobility: assist 1 pivot   Events/Education: patient will need PICC education   Plan: plan to discharge home tomorrow with spouse and with the wound VAC and with the PICC in place

## 2023-12-19 NOTE — PLAN OF CARE
Speech Language Therapy Discharge Summary    Reason for therapy discharge:    Discharged to home with outpatient therapy.    Progress towards therapy goal(s). See goals on Care Plan in [facility] electronic health record for goal details.  Goals partially met.  Barriers to achieving goals:   discharge from facility.    Therapy recommendation(s):    Continued therapy is recommended.  Rationale/Recommendations:  Continued OP SLP follow-up at [facility] voice clinic for dysphonia.    Recommend regular textures and thin liquids. Pt should be fully upright and alert for all PO, take small sips/bites, slow pacing, and alternate between consistencies.

## 2023-12-19 NOTE — PLAN OF CARE
Goal Outcome Evaluation:  Care from 7 am to 7 pm  A&Ox4.B/P runs high; pt is on B/P meds.Denied pain. Hep drip discontinued. Pt restarted elquis. Left stump wound vac changed by provider this morning, wound vac at 75 mmhg, with small serosang OP. Denied pain. Uses urinal at bed side; adequate UOP. No Bm this shift; pt stated that his last BM was on Saturday; but pt does not feel constipated. Fair appetite. Up in [place] via a wheelchair with wife.   Pt to have PICC education per home infusion RN at bed side  before discharge.  Wound vac supplies and wheelchair are delivered to pt.  Wife was at bed side; supportive of pt.  Plan for discharge tomorrow.  Continue with POC

## 2023-12-19 NOTE — PLAN OF CARE
BP (!) 151/90 (BP Location: Left arm)   Pulse 83   Temp 98  F (36.7  C) (Oral)   Resp 16   Wt 58.2 kg (128 lb 4.8 oz)   SpO2 100%   BMI 20.09 kg/m      Pt. discharged at 1220 to home with home care, was accompanied by family, and left with personal belongings. Pt. received complete discharge paperwork and medications as filled by discharge pharmacy. Pt. was given times of last dose for all discharge medications in writing on discharge medication sheets. Discharge teaching included medication, pain management, activity restrictions, dressing changes, and signs and symptoms of infection. Pt. had no further questions at the time of discharge and no unmet needs were identified.

## 2023-12-19 NOTE — PROGRESS NOTES
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 Yes - VRE  Pending Cultures/Microbiological Tests: yes Finalization of intra-op cultures from 12/8/2023 and 12/15/2023 pending    Inpatient ID involved in developing OPAT plan: Yes - discharge OPAT plan has no changes from ID provider, Dr. Tellez, OPAT plan charted on 12/18/2023    Outpatient ID Follow-up: ID OPAT Clinic Referral Placed ([facility] Ph: [phone] and Fax: [phone]) - appointment not scheduled, but needed (appointment ideally scheduled with Dr. Nidia Garcia before end of therapy 1/26/2024)  Designated Provider: Dr. Tanika Tellez until ID- follow-up    Antimicrobial Regimen / Route Anticipated  Duration Start Date Stop /  Reassess Date   Daptomycin 700 mg (12 mg/kg)  (every 48 hours (renally-adjusted))/IV 6 weeks from final debridement 12/15 12/15/2023 1/26/2024       (Monitor for sustained renal improvement to determine need to renally-adjust frequency to q24h (from current q48h frequency))        Laboratory Tests and Monitoring Frequency: CBC with Diff, CK, BMP Once Weekly    Imaging/Miscellaneous Monitoring: None - may consider transition to linezolid later in course    ID Pharmacist Interventions: None                          Kaylene Hawk, PharmD  PGY2 ID Infectious Diseases Pharmacy Resident  Pager: [phone]

## 2023-12-19 NOTE — TELEPHONE ENCOUNTER
12/19 LVM and Myc for patient to sched follow ups with Miryam Carrasco on 12/26/23, on 01/02/24, 01/15/24 and 01/29/24.

## 2023-12-19 NOTE — PLAN OF CARE
Pt is alert and oriented x4, AVSS, afebrile on RA. Pt denies pain, nausea, vomiting, numbness and tingling. Wound vac on left thigh to negative 75mmhg. Plan is for pt to discharge in AM. Call light within reach, no unmet need at this time.

## 2023-12-19 NOTE — PROGRESS NOTES
Home Infusion  Alfredo will be discharging today and going home on IV Daptomycin q48hrs.  He has not done home IV therapy before and needs initial teaching.  Met with patient, spouse Tabby, and daughter at bedside.  Provided information on [facility] services and reviewed self-pay cost for IV push Daptomycin $98.26/day for infusion days and $30/day on non-infusion days.  [facility].  They are willing to self-pay for IV antibiotics but would like to know if [facility] will [facility] once he is established.  Informed them that may be possible but we would need to wait until his appointment to determine if [facility] will [facility].   Instructed spouse in IV Daptomycin administration via IV Push method with SASH flushing and daily heparin flushing of unused PICC lumen.   Had Tabby perform some hands on with practice equipment and teaching sheets.   Provided information about supplies and supply delivery, storage of medication, checking of label, dosing times, covering PICC for showers, plan for SNV and 24/7 availability of [facility] staff.      Spouse and daughter verbalized understanding of information given.  Spouse demonstrated good technique with practice and verbalized fair understanding of process.  Stated she would feel comfortable with administering IV antibiotics after further teaching.  [facility]  [facility] RN will see pt tomorrow morning for initial visit, teaching and wound vac dressing change.    Patient is ready for discharge from [facility] perspective.    ARACELY Burns  [facility] Nurse Liaison   [email or web address]  Cell: [phone] M-F  [facility] Main: [phone]

## 2023-12-19 NOTE — OP NOTE
VASCULAR SURGERY OPERATIVE REPORT     LOCATION:    [facility]    Alfredo Burnham  Medical Record #:  [id number]  YOB: 1955  Age:  68 year old       Date of Service: 12/1/2023 - 12/15/2023     Preoperative diagnosis      Enterococcus Faecalis Infection of Amputation Stump with history of stool spillage on stump  Bacteremia  Status post repair of open pararenal aneurysm  Status post left lower extremity above knee amputation    Postoperative diagnosis      Enterococcus Faecalis Infection of Amputation Stump with history of stool spillage on stump  Bacteremia  Status post repair of open pararenal aneurysm  Status post left lower extremity above knee amputation    Surgeon: Boris Lowe MD      Procedures:  Irrigation and debridement left lower extremity amputation stump  Resection of bone for culture and pathology  Placement of antibiotic beads  Placement of negative pressure wound vac    Findings:   No purulence, however some necrotic tissue in the deep musculature that was sharply debrided.    Indication for procedure:    Mr. Burnham is a 68 year old male who presented to with purulent drainage after repair of ruptured pararenal aneurysm and above knee amputation.  Risks, benefits, alternatives and indications including but not limited to death, anesthetic or cardiopulmonary complications, bleeding, infection, lymphatic leak, acute renal insufficiency requiring temporary or permanent dialysis, and failure to heal.  We discussed operative conduct including team approach, potential need for blood transfusion, and possibility of performing medical photography.  The patient and his wife Tabby understand the risks and would like to proceed with planned staged irrigation and debridement with cultures, placement of antibiotic beads, without plans for closure today.  This is in an effort to help him return home for the holidays before definitive  closure.          Description of procedure:    Operative details:      The patient was brought to the operating room.  Under satisfactory general anesthesia, he was placed in supine position with all pressure points padded in standard fashion.  The left leg was widely prepped circumferentially and draped in sterile, standard fashion.  A surgical pause was performed with all members of the surgical team to verify patient, medical record number, birth date, planned surgical procedure, surgical site, allergies, fire risk and perioperative antibiotics.     The wound was carefully inspected after removal of the kerlix dressing. Some necrotic tissue was identified. The incision was opened through its length and some necrotic deep muscle was debrided with scissors and sent for culture over an area measuring 5x5x2 cm.  The bone was identified without evidence of gross infection, New bone cultures were sent an a specimen was sent to pathology.  We  irrigated with 3 L of warm antibiotic solution.  Gentamicin antibiotic beads were mixed and allowed to set.  Once hemostasis was achieved 3 antibiotic beads were inserted next to the femur. Soft tissue was lightly approximated over the beads.  The wound was packed with one black sponge.  A sterile negative pressure wound vac dressing was applied     he was transferred to the recovery in stable condition    Estimated blood loss: 30 ml     Specimens: none     Complications: none apparent    Plan:  -Hold heparin today  -Continue wound vac  -Follow cultures          Boris Lowe MD, RPVI  VASCULAR AND ENDOVASCULAR SURGERY   [facility] Vascular Surgery

## 2023-12-19 NOTE — DISCHARGE SUMMARY
Vascular Surgery Discharge Summary    NAME: Alfredo Burnham   MRN: [id number]   : 1955     DATE OF ADMISSION: 23 and readmission on 2023 from acute rehab     PRE/POSTOPERATIVE DIAGNOSES:    Date of Surgery:   1. Ruptured Pararenal Abdominal Aortic Aneurysm    Date of surgery: 2023  1. Status post open repair of ruptured abdominal aortic aneurysm  2. Open abdomen  3. Shock requiring pressor support  4. Ischemic left leg    Date of Surgery:   1. Status post repair of Ruptured pararenal Abdominal Aortic Aneurysm    Date of surgery:   1. Open abdomen after repair of abdominal aortic rupture  2. Acute critical limb ischemia of left leg  3. Left lower extremity deep venous thrombosis  4. Acute thrombocytopenia  5. Acute blood loss anemia  6 Protein calore deficit malnutrition  7 Acute kidney injury  8 Acute hypoxic respiratory failure  9. Mixed metabolic encephalopathy    Date of surgery: 2023  1. Ischemic left lower extremity  2. Status post open repair of ruptured aaa  3. Open abdomen    Date of surgery: 2023  1. Open subcutaneous abdominal incision  2. Status post open repair of ruptured pararenal aneurysm    Date of surgery: 2023  1. Infected left above-knee amputation stump  2. Status post left above-knee amputation  3. Status post open repair of left ruptured abdominal aortic aneurysm    Date of surgery: 2023  1. Enterococcus Faecalis Infection of Amputation Stump with history of stool spillage on stump  Bacteremia  2. Status post repair of open pararenal aneurysm  3. Status post left lower extremity above knee amputation    Date of surgery: 15 December 2023  1. Enterococcus Faecalis Infection of Amputation Stump with history of stool spillage on stump  Bacteremia  2. Status post repair of open pararenal aneurysm  3. Status post left lower extremity above knee amputation      PROCEDURES  PERFORMED, :   Date of Surgery: 24 September, 2023  1. Resection of ruptured pararenal abdominal aortic and iliac aneurysms  2. Replacement with ohioh-um-srmvh bypass utilizing a 20x10 bifurcated Hemagard Dacron graft  3. Endovascular balloon occlusion of the supraceliac aorta  4. Placement of the catheter and 14 Fr sheath into the aorta  5. Realtime, ultrasound guided right common femoral artery access  6. Interpretation of radiologic findings  7. Placement of Perclose device x2  8. Temporary abdominal closure    Date of surgery: 25 September 2023  1. Exploratory laparotomy  2. Removal and exchange of quick clot packs  3. Open exposure of left below-knee popliteal artery  4. Embolectomy of left leg superficial femoral, popliteal and tibial arteries  5. Left leg 4 compartment fasciotomy    Date of Surgery: 29 September, 2023  1. Abdominal exploration and washout  2. Evaluation bowel  3. Temporary abdominal closure  4. Partial closure left medial fasciotomy    Date of surgery: 2 October, 2023  1. Exploratory laparotomy  2. Abdominal wall closure, resulting defect size 32 x 5 x 3 cm  3. Wound vac replacement to left lower extremity, 10 x 5 x 1 cm    Date of surgery: 17 October 2023  1. Left above-knee amputation  2. Removal and replacement of new abdominal wound VAC    Date of surgery: 20 October 2023  1. Irrigation and debridement open midline abominal incision  2. Delayed primary closure of abdominal wound in multi-layer fashion  3. Placement of a subcutaneous drain    Date of surgery: 1 December 2023  1. Incision and drainage of left above-knee amputation stump  2. Sharp excisional debridement of left AKA stump subcutaneous tissue (area less than 10 cm)    Date of surgery: 8 December 2023  1. Irrigation and debridement left lower extremity amputation stump  2. Revision amputation with resection of bone for culture    Date of surgery: 15 December 2023  1. Irrigation and debridement left lower extremity amputation  stump  2. Resection of bone for culture and pathology  3. Placement of antibiotic beads  4. Placement of negative pressure wound vac    INTRAOPERATIVE FINDINGS:  See multiple individual operative reports    POSTOPERATIVE COMPLICATIONS: MAYA, Limb ischemia    DATE OF DISCHARGE: 12/19/23    HOSPITAL COURSE: Alfredo Burnham is a 68 year old male who on above mentioned dates, underwent the multiple procedures.  His post operative course was complicated by prolonged critical, requiring multiple returns to the operating room, multiple prolonged reintubations given return to operating room, dysphagia, vocal fold paralysis requiring vocal fold injection by ENT, complications related to acute kidney injury with now baseline serum creatinine 1.9-2.1, insomnia and delirium related to critical illness, limb ischemia requiring left above-the-knee amputation, and subsequent infection of left lower extremity amputation site.  Patient was discharged to acute rehab on 11/17/2023 however readmitted for AKA site infection, positive for VRE. ID was engaged and patient will be on 6 weeks of antibiotics via PICC line, patient will be seen by ID on an outpatient basis.  He was also appropriately treated by physical therapy and Occupational Therapy.  He has dysphagia resolved voice continues to improve patient has follow-up scheduled with ENT on outpatient basis.  His left above-the-knee amputation site has antibiotic beads, wound VAC was placed and instructions related to VAC changes were defined in discharge instructions. Prior to discharge, his pain was controlled well with Tylenol and oxycodone. He was able to perform ADLs and within the limitations of his above-the-knee amputation, directly guided by physical therapy and Occupational Therapy.  Patient also had full return of bowel and bladder function.  On 12/19/23, he was discharged to home with home services, inpatient therapy and nursing care, in stable  condition.      Addendum: Since the AAA repair, patient required multiple returns to the operating room, multiple prolonged intubations with complications of dysphagia. Patient was subsequently on tube feedings for many weeks and followed diligently by SLP and dietitian for moderate protein calorie deficit malnutrition. Eventually patient was able to advance p.o. intake, most recently to regular diet. Patient was consistently followed by registered dietitian and received nutritional protein shakes and high protein diet. Patient's family was advised to bring in high-protein shakes to supplement p.o. intake given patient's taste preference. Patient and his family also received education on continuing to increase his p.o. intake for optimal recovery while at home.     Physical Exam:  Constitutional: healthy, alert, no acute distress and cooperative   Cardiovascular: pulses regular  Respiratory: breathing unlabored without secondary muscle use  Psychiatric: mentation appears normal and affect normal/bright  Neck: no asymmetry  GI/Abdomen: abdomen soft, non-tender. No palpable masses  MSK: able to move all extremities without weakness or ataxia  Extremities: extremities warm and well perfused  Hematology: no bruising on visible skin  Pulses: symmetric and palpable radial bilaterally, DP/PT on RLE    DISCHARGE INSTRUCTIONS:    Diet  - You may return to your regular diet. We always encourage taking fiber as well as staying well-hydrated.   - Be sure to drink plenty of fluids.     Activity  - Per PT and OT recommendations    Wound Care  VAC changes Monday, Wednesdays, Fridays.     VAC instructions:  VAC on LLE M/W/F changes (changed 12/18/23). Negative pressure 125mmHg.   For discharge, wound contains white antibiotic beads. DO NOT REMOVE. Only black sponge is to be replaced, do not insert additional white sponges or any other material in wound, do not remove the current white antibiotic beads. Gentle measurement of current  open wound, do not dig in looking for tunneling.    Call Vascular surgery [facility] for:  - Temperature > 101F, chills, rigors, dizziness  - Redness around or purulent drainage from wound  - Inability to tolerate diet, nausea or vomiting  - You stop passing gas, develop significant bloating, abdominal pain  - Have blood in stools/vomit  - Have severe diarrhea/constipation  - Any other questions or concerns.    Medication Instructions  Most of your medications may have changed. Please take only prescribed and resumed medications.     Follow up:    PCP:  Follow up with PCP in 5-7 days for post-hospitalization follow up. You may call to set this up - most clinics will be able to get you an appointment within the week if you let them know you were recently hospitalized and need to see your PCP.     Vascular:    We will schedule follow up with you for 12/26/23, out team will call to schedule this.     With general vascular surgery questions, concerns, or to request/change an appointment, please call:      Vascular Call Center  [phone]    To contact someone after 5 pm, on a weekend, or on a Holiday, please call:  [facility]  [phone], option 4 to have the Attending Physician for Vascular Surgery Service paged.              Prolonged Parenteral/Oral Antibiotic Recommendations and ID Follow up  This template provides final ID recommendations as of this date.      Infectious Diseases Indication: VRE osteomyelitis     Antibiotic Information  Name of Antibiotic Dose of Antibiotic1 Anticipated duration Effective start date2 End date   Daptomycin 12 mg/kg/day (renally dosed) 6 weeks 12/15/23 1/26/24     Current dose is 700 mg Q48H but if renal function improves, may need to change to Q24H.                      1.Dose of antibiotic will need to be renally adjusted if creatinine clearance changes  2.Effective start date is the date of adequate therapy with appropriate spectrum     Method of  "antibiotic delivery:PICC line.Is the line being used for another indication besides antimicrobials? No At the end of therapy should the line used for antimicrobials be removed or de-accessed? Yes. Selecting \"yes\" will function as written order to remove PICC line or de-access the indwelling line at the end of therapy.     Weekly labs required: CBC with diff, BMP, and CK. Dr. Tellez will follow labs at discharge until ID follow up. Fax labs to ID clinic.     Are there pending microbial tests: Yes Cultures      Imaging for ID follow up: ID Imaging: No.      We will attempt to make OPAT appointments within 2 weeks of discharge based on clinic availability.  Provider: Jose, timing of visit before this specific date 1/26/24 , and the type of clinic appointment visit Okay for in person or a virtual visit.      ID provider route note: OPAT RN Care Coordinator [facility] ID Clinic Information:  Phone: [phone]  Fax: [phone] (Andrew Tanner)     [facility] - IV Antibiotics  Phone  [phone]      [facility]    Services Available   Home Health Services       Address   [address] [address] [facility], [address]  [place] [address]             Contact Information    [phone]          DISCHARGE MEDICATIONS:     Review of your medicines      START taking      Dose / Directions   DAPTOmycin 700 mg  Indication: vancomycin resistant enterococcus growing in L AKA wound  Used for: Infrarenal abdominal aortic aneurysm (AAA) without rupture (H24), S/P above knee amputation, left (H), Acute kidney failure with tubular necrosis (H), Infrarenal abdominal aortic aneurysm, ruptured (H), Hypertension, unspecified type      Dose: 12 mg/kg  Start taking on: December 20, 2023  Inject 700 mg into the vein every 48 hours for 37 days  Refills: 0        CONTINUE these medicines which may have CHANGED, or have new prescriptions. If we are uncertain of " the size of tablets/capsules you have at home, strength may be listed as something that might have changed.      Dose / Directions   acetaminophen 325 MG tablet  Commonly known as: TYLENOL  Indication: Pain  This may have changed:     how much to take    when to take this    reasons to take this    Another medication with the same name was removed. Continue taking this medication, and follow the directions you see here.  Used for: Infrarenal abdominal aortic aneurysm (AAA) without rupture (H24), S/P above knee amputation, left (H), Acute kidney failure with tubular necrosis (H), Infrarenal abdominal aortic aneurysm, ruptured (H), Hypertension, unspecified type      Dose: 975 mg  Take 3 tablets (975 mg) by mouth every 8 hours as needed for mild pain  Refills: 0     amLODIPine 10 MG tablet  Commonly known as: NORVASC  Indication: High Blood Pressure Disorder  This may have changed: additional instructions  Used for: Infrarenal abdominal aortic aneurysm (AAA) without rupture (H24), S/P above knee amputation, left (H), Acute kidney failure with tubular necrosis (H), Infrarenal abdominal aortic aneurysm, ruptured (H), Hypertension, unspecified type      Dose: 10 mg  Take 1 tablet (10 mg) by mouth daily for 360 days Hold for SBP<105  Quantity: 90 tablet  Refills: 3     docusate sodium 50 MG capsule  Commonly known as: COLACE  This may have changed: additional instructions  Used for: Infrarenal abdominal aortic aneurysm (AAA) without rupture (H24), S/P above knee amputation, left (H), Acute kidney failure with tubular necrosis (H), Infrarenal abdominal aortic aneurysm, ruptured (H), Hypertension, unspecified type      Dose: 50 mg  Take 1 capsule (50 mg) by mouth daily for 20 days Hold for lose stools  Quantity: 20 capsule  Refills: 0     * melatonin 3 MG tablet  Indication: Trouble Sleeping  This may have changed: Another medication with the same name was added. Make sure you understand how and when to take each.       Dose: 3 mg  Take 1 tablet (3 mg) by mouth every evening  Refills: 0     * melatonin 3 MG tablet  Indication: Trouble Sleeping  This may have changed: You were already taking a medication with the same name, and this prescription was added. Make sure you understand how and when to take each.  Used for: Infrarenal abdominal aortic aneurysm (AAA) without rupture (H24), S/P above knee amputation, left (H), Acute kidney failure with tubular necrosis (H), Infrarenal abdominal aortic aneurysm, ruptured (H), Hypertension, unspecified type      Dose: 3 mg  Take 1 tablet (3 mg) by mouth every evening for 30 days  Quantity: 30 tablet  Refills: 0     metoprolol tartrate 100 MG tablet  Commonly known as: LOPRESSOR  Indication: High Blood Pressure Disorder  This may have changed: additional instructions  Used for: Infrarenal abdominal aortic aneurysm (AAA) without rupture (H24), S/P above knee amputation, left (H), Acute kidney failure with tubular necrosis (H), Infrarenal abdominal aortic aneurysm, ruptured (H), Hypertension, unspecified type      Dose: 100 mg  Take 1 tablet (100 mg) by mouth 2 times daily for 360 days Hold for SBP<100  Quantity: 180 tablet  Refills: 3     polyethylene glycol 17 GM/Dose powder  Commonly known as: MIRALAX  This may have changed:     medication strength    reasons to take this    additional instructions  Used for: Infrarenal abdominal aortic aneurysm (AAA) without rupture (H24), S/P above knee amputation, left (H), Acute kidney failure with tubular necrosis (H), Infrarenal abdominal aortic aneurysm, ruptured (H), Hypertension, unspecified type      Dose: 17 g  Take 17 g by mouth daily as needed Only take if constipated  Quantity: 340 g  Refills: 0     psyllium Packet  Commonly known as: METAMUCIL/KONSYL  Indication: NUTRITION  This may have changed:     when to take this    reasons to take this    additional instructions  Used for: Infrarenal abdominal aortic aneurysm (AAA) without rupture (H24),  S/P above knee amputation, left (H), Acute kidney failure with tubular necrosis (H), Infrarenal abdominal aortic aneurysm, ruptured (H), Hypertension, unspecified type      Dose: 1 packet  Take 1 packet by mouth daily as needed for constipation Only take if constipated  Quantity: 20 packet  Refills: 0     * QUEtiapine 25 MG tablet  Commonly known as: SEROquel  Indication: Agitation  This may have changed: Another medication with the same name was added. Make sure you understand how and when to take each.  Used for: Infrarenal abdominal aortic aneurysm (AAA) without rupture (H24), S/P above knee amputation, left (H), Acute kidney failure with tubular necrosis (H), Infrarenal abdominal aortic aneurysm, ruptured (H), Hypertension, unspecified type      Dose: 75 mg  Take 3 tablets (75 mg) by mouth at bedtime for 360 days  Quantity: 270 tablet  Refills: 3     * QUEtiapine 25 MG tablet  Commonly known as: SEROquel  This may have changed: You were already taking a medication with the same name, and this prescription was added. Make sure you understand how and when to take each.  Used for: Infrarenal abdominal aortic aneurysm (AAA) without rupture (H24), S/P above knee amputation, left (H), Acute kidney failure with tubular necrosis (H), Infrarenal abdominal aortic aneurysm, ruptured (H), Hypertension, unspecified type      Dose: 25 mg  Take 1 tablet (25 mg) by mouth 2 times daily as needed For anxiety  Quantity: 180 tablet  Refills: 3     sennosides 8.6 MG tablet  Commonly known as: SENOKOT  This may have changed:     when to take this    reasons to take this    additional instructions  Used for: Infrarenal abdominal aortic aneurysm (AAA) without rupture (H24), S/P above knee amputation, left (H), Acute kidney failure with tubular necrosis (H), Infrarenal abdominal aortic aneurysm, ruptured (H), Hypertension, unspecified type      Dose: 1 tablet  Take 1 tablet by mouth 2 times daily as needed for constipation Only take if  constipated  Quantity: 40 tablet  Refills: 3         * This list has 4 medication(s) that are the same as other medications prescribed for you. Read the directions carefully, and ask your doctor or other care provider to review them with you.            CONTINUE these medicines which have NOT CHANGED      Dose / Directions   apixaban ANTICOAGULANT 5 MG tablet  Commonly known as: ELIQUIS  Used for: Infrarenal abdominal aortic aneurysm (AAA) without rupture (H24), S/P above knee amputation, left (H), Acute kidney failure with tubular necrosis (H), Infrarenal abdominal aortic aneurysm, ruptured (H), Hypertension, unspecified type      Dose: 5 mg  Take 1 tablet (5 mg) by mouth 2 times daily for 90 days  Quantity: 180 tablet  Refills: 0     atorvastatin 10 MG tablet  Commonly known as: LIPITOR  Used for: Infrarenal abdominal aortic aneurysm, ruptured (H), Hypertension, unspecified type, Infrarenal abdominal aortic aneurysm (AAA) without rupture (H24), Acute kidney failure with tubular necrosis (H)      Dose: 10 mg  Take 1 tablet (10 mg) by mouth every evening  Refills: 0     pantoprazole 40 MG EC tablet  Commonly known as: PROTONIX  Indication: Heartburn  Used for: Infrarenal abdominal aortic aneurysm (AAA) without rupture (H24), S/P above knee amputation, left (H), Acute kidney failure with tubular necrosis (H), Infrarenal abdominal aortic aneurysm, ruptured (H), Hypertension, unspecified type      Dose: 40 mg  Take 1 tablet (40 mg) by mouth every 24 hours for 360 days  Quantity: 90 tablet  Refills: 3     Vitamin D3 25 mcg (1000 units) tablet  Commonly known as: CHOLECALCIFEROL  Used for: Infrarenal abdominal aortic aneurysm (AAA) without rupture (H24), S/P above knee amputation, left (H), Acute kidney failure with tubular necrosis (H), Infrarenal abdominal aortic aneurysm, ruptured (H), Hypertension, unspecified type      Dose: 50 mcg  Take 2 tablets (50 mcg) by mouth every 24 hours for 360 days  Quantity: 180  tablet  Refills: 3        STOP taking    calcium carbonate 500 MG chewable tablet  Commonly known as: TUMS        hydrOXYzine HCl 25 MG tablet  Commonly known as: ATARAX        ondansetron 4 MG ODT tab  Commonly known as: ZOFRAN ODT        oxyCODONE 5 MG tablet  Commonly known as: ROXICODONE        wound support modular Liqd cup  Commonly known as: EXPEDITE              Where to get your medicines      These medications were sent to [facility] - [place], [place] - [address] [place] [address]    Phone: [phone]     amLODIPine 10 MG tablet    apixaban ANTICOAGULANT 5 MG tablet    docusate sodium 50 MG capsule    melatonin 3 MG tablet    metoprolol tartrate 100 MG tablet    pantoprazole 40 MG EC tablet    polyethylene glycol 17 GM/Dose powder    psyllium Packet    sennosides 8.6 MG tablet    Vitamin D3 25 mcg (1000 units) tablet     Some of these will need a paper prescription and others can be bought over the counter. Ask your nurse if you have questions.    You don't need a prescription for these medications    acetaminophen 325 MG tablet     These medications will be mailed to you     From: [facility] - [place], [address] Phone: [phone]     QUEtiapine 25 MG tablet    QUEtiapine 25 MG tablet         Miryam Bacu, CNP  Vascular Surgery  Pager: [phone]  [email or web address]  Send message or 10 digit call back number Securely via "[employer]" with the "[employer]" Web Console (learn more here)'

## 2023-12-19 NOTE — PROGRESS NOTES
Care Management Discharge Note    Discharge Date: 12/19/2023       Discharge Disposition: Home  Discharge Services:  Home Care, Home Infusion    Discharge DME: Wheelchair, wound vac, IV abx supplies    Discharge Transportation: family or friend will provide    Private pay costs discussed:  Still awaiting insurance coverage through the [facility] for home IV abx coverage. Pt/family willing to private pay if needed.     Does the patient's insurance plan have a 3 day qualifying hospital stay waiver?  No    PAS Confirmation Code:  NA  Patient/family educated on Medicare website which has current facility and service quality ratings: no    Education Provided on the Discharge Plan: Yes  Persons Notified of Discharge Plans: Pt, family, home care agency, [facility]  Patient/Family in Agreement with the Plan: yes    Handoff Referral Completed: Yes    Additional Information:  Pt medically ready for discharge today. Provider changed wound vac to home vac this morning. [facility] liaison coming to bedside for education. [facility] home [facility] will have start of care visit tomorrow at pt's home. Manual wheelchair present at hospital. Family will transport. No further discharge needs.     Outpatient Services:     Home infusion for home IVAB/supplies  [facility]  P: [phone]  F: [phone]      Home Care for RN/PT/OT services  [facility]   P: [phone]  F: [phone]      Rosa Barr RN   Nurse Coordinator    Covering for: 7A  Phone: *[phone]    Social Work and Care Management Department       SEARCHABLE in [facility] - search CARE COORDINATOR       [place] ([id number]) Saturday & Sunday; ([phone])  Recognized Holidays     Units: 5A, 5B & 5C  Pager: [phone]    Units: 6B, 6C & 6D    Pager: [phone]    Units: 7A, 7B, 7C & 7D    Pager: [phone]    Units: 6A & ICU   Pager: [phone]    Units: 5 Ortho, 5MS & WB ED Pager: [phone]    Units: 6MS, 8A & 10 ICU  Pager [phone]

## 2023-12-19 NOTE — TELEPHONE ENCOUNTER
----- Message from ALTAGRACIA Figueroa CNP sent at 12/19/2023 12:41 PM CST -----  Hi, pleased do call, they have many appointments coming up as he is being discharged from a 3 month hospitalization and will need to coordinate travel (wheelchair bound).   Thank you,  Miryam    ----- Message -----  From: Dieudonne Briggs  Sent: 12/19/2023  12:28 PM CST  To: ALTAGRACIA Figueroa CNP; Marquita Adan RN; #    Will the patient need a dial out or is it okay to schedule with out a call?   ----- Message -----  From: Miryam Carrasco APRN CNP  Sent: 12/19/2023  12:26 PM CST  To: Marquita Adan RN; Tiffanie Paula LPN; #    Hi team,  Can you please schedule asap Mr. Burnham in my clinic in person on 12/26/23, on 01/02/24, 01/15/24 and 01/29/24.  Thank you,  Miryam

## 2023-12-20 ENCOUNTER — LAB REQUISITION (OUTPATIENT)
Dept: LAB | Facility: CLINIC | Age: 68
End: 2023-12-20
Payer: COMMERCIAL

## 2023-12-20 DIAGNOSIS — T81.42XA INFECTION FOLLOWING A PROCEDURE, DEEP INCISIONAL SURGICAL SITE, INITIAL ENCOUNTER: ICD-10-CM

## 2023-12-20 LAB
ANION GAP SERPL CALCULATED.3IONS-SCNC: 8 MMOL/L (ref 7–15)
BACTERIA TISS BX CULT: NO GROWTH
BASOPHILS # BLD AUTO: 0.1 10E3/UL (ref 0–0.2)
BASOPHILS NFR BLD AUTO: 1 %
BUN SERPL-MCNC: 34 MG/DL (ref 8–23)
CALCIUM SERPL-MCNC: 10 MG/DL (ref 8.8–10.2)
CHLORIDE SERPL-SCNC: 108 MMOL/L (ref 98–107)
CK SERPL-CCNC: 13 U/L (ref 39–308)
CREAT SERPL-MCNC: 1.79 MG/DL (ref 0.67–1.17)
DEPRECATED HCO3 PLAS-SCNC: 22 MMOL/L (ref 22–29)
EGFRCR SERPLBLD CKD-EPI 2021: 40 ML/MIN/1.73M2
EOSINOPHIL # BLD AUTO: 2.1 10E3/UL (ref 0–0.7)
EOSINOPHIL NFR BLD AUTO: 20 %
ERYTHROCYTE [DISTWIDTH] IN BLOOD BY AUTOMATED COUNT: 16.4 % (ref 10–15)
GLUCOSE SERPL-MCNC: 100 MG/DL (ref 70–99)
HCT VFR BLD AUTO: 28.5 % (ref 40–53)
HGB BLD-MCNC: 9.1 G/DL (ref 13.3–17.7)
HOLD SPECIMEN: NORMAL
HOLD SPECIMEN: NORMAL
IMM GRANULOCYTES # BLD: 0.1 10E3/UL
IMM GRANULOCYTES NFR BLD: 1 %
LYMPHOCYTES # BLD AUTO: 1.5 10E3/UL (ref 0.8–5.3)
LYMPHOCYTES NFR BLD AUTO: 14 %
MCH RBC QN AUTO: 31 PG (ref 26.5–33)
MCHC RBC AUTO-ENTMCNC: 31.9 G/DL (ref 31.5–36.5)
MCV RBC AUTO: 97 FL (ref 78–100)
MONOCYTES # BLD AUTO: 1 10E3/UL (ref 0–1.3)
MONOCYTES NFR BLD AUTO: 9 %
NEUTROPHILS # BLD AUTO: 6 10E3/UL (ref 1.6–8.3)
NEUTROPHILS NFR BLD AUTO: 55 %
NRBC # BLD AUTO: 0 10E3/UL
NRBC BLD AUTO-RTO: 0 /100
PATH REPORT.COMMENTS IMP SPEC: NORMAL
PATH REPORT.COMMENTS IMP SPEC: NORMAL
PATH REPORT.FINAL DX SPEC: NORMAL
PATH REPORT.GROSS SPEC: NORMAL
PATH REPORT.MICROSCOPIC SPEC OTHER STN: NORMAL
PATH REPORT.RELEVANT HX SPEC: NORMAL
PHOTO IMAGE: NORMAL
PLATELET # BLD AUTO: 133 10E3/UL (ref 150–450)
POTASSIUM SERPL-SCNC: 4.6 MMOL/L (ref 3.4–5.3)
RBC # BLD AUTO: 2.94 10E6/UL (ref 4.4–5.9)
SODIUM SERPL-SCNC: 138 MMOL/L (ref 135–145)
WBC # BLD AUTO: 10.7 10E3/UL (ref 4–11)

## 2023-12-20 PROCEDURE — 82550 ASSAY OF CK (CPK): CPT | Performed by: INTERNAL MEDICINE

## 2023-12-20 PROCEDURE — 36592 COLLECT BLOOD FROM PICC: CPT | Performed by: INTERNAL MEDICINE

## 2023-12-20 PROCEDURE — 82310 ASSAY OF CALCIUM: CPT | Performed by: INTERNAL MEDICINE

## 2023-12-20 PROCEDURE — 85014 HEMATOCRIT: CPT | Performed by: INTERNAL MEDICINE

## 2023-12-21 ENCOUNTER — TELEPHONE (OUTPATIENT)
Dept: VASCULAR SURGERY | Facility: CLINIC | Age: 68
End: 2023-12-21

## 2023-12-21 ENCOUNTER — PATIENT OUTREACH (OUTPATIENT)
Dept: CARE COORDINATION | Facility: CLINIC | Age: 68
End: 2023-12-21
Payer: COMMERCIAL

## 2023-12-21 NOTE — TELEPHONE ENCOUNTER
Spoke with Alfredo regarding how pt is doing s/p stump infection and revision.    Pt reports their pain is minimal and they are using PRN tylenol to control their pain. The incision is covered with a wound vac. Home care is coming 3x/wk to assist with changing the vac. Pt's wife reports they are using ACE wraps on the stump as well. Pt reports they are eating and drinking w/o difficulty. Pt is voiding and has NOT had a bowel movement. LBM 12/19. Pt is taking colace as prescribed. We discussed adding in Miralax tomorrow if no BM today.    Wife is administering IV dapto via PICC. We discussed the instructions for this again and I answered her questions. Home HHC RN is also helping to manage this.     VQI measures: Pt has been prescribed Eliquis and atorvastatin and reports they are taking it as prescribed.    Confirmed follow up appointments and provided callback number for further questions/concerns.    JUSTIN SamuelsN, RN  RN Care Coordinator  [facility] Vascular Surgery - [facility] phone: [phone]  Fax: [phone]

## 2023-12-21 NOTE — PROGRESS NOTES
"  [facility]: [facility]: Post-Discharge Note  SITUATION                                                      Admission:    Admission Date: 12/01/23   Reason for Admission: AAA rupture repair, left lower extremity amputation, infection  requiring multiple washes and debridement now on  antibiotics  Discharge:   Discharge Date: 12/19/23  Discharge Diagnosis: AAA rupture repair, left lower extremity amputation, infection  requiring multiple washes and debridement now on  antibiotics    BACKGROUND                                                      Per hospital discharge summary and inpatient provider notes:    Alfredo Burnham is a 67 yo M with PMHx of HTN, 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, recent hospitalization (9/24 - 11/17, 2023) for AAA s/p open repair 9/24/2023, c/b LLE ischemia s/p unsuccessful thrombectomy s/p AKA (10/17/2023), MAYA requiring HD via R internal jugular tunneled and removal due to recovered renal function, embolic CVA, left iliac, psoas muscle hematoma, dysphonia 2/2 left vocal fold paralysis. Eventually discharged to ARU (11/17 - 11/30), and required to admit due to left AKA stump site infection. Alfredo was relatively doing well  until 11/29, when noted wound vac malfunction, and significant amount of serosanguineous, purulent appearing malodorous drainage, expressed out with pressure application, from an small opening on lateral aspect of stump incision. 11/29 Wound and blood cultures obtained and transferred to [facility]. Underwent I&D 12/1. Intra-op findings: \"producing again a large volume of brownish fluid....was a small amount of murky looking subcutaneous tissue\". Intra-op cultures were taken from the cavity as the fluid was emanating. 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 ID consult is requested for antibiotic management.      ASSESSMENT           Discharge Assessment  How are you doing now that you are home?: I am doing good. Yesterday " home health care came out to visit. [facility]  How are your symptoms? (Red Flag symptoms escalate to triage hotline per guidelines): Improved  Do you feel your condition is stable enough to be safe at home until your provider visit?: Yes  Does the patient have their discharge instructions? : Yes  Does the patient have questions regarding their discharge instructions? : No  Were you started on any new medications or were there changes to any of your previous medications? : Yes  Does the patient have all of their medications?: Yes  Do you have questions regarding any of your medications? : No  Do you have all of your needed medical supplies or equipment (DME)?  (i.e. oxygen tank, CPAP, cane, etc.): Yes  Discharge follow-up appointment scheduled within 14 calendar days? : Yes  Discharge Follow Up Appointment Date: 12/26/23  Discharge Follow Up Appointment Scheduled with?: Specialty Care Provider    Post-op ([facility] CTA Only)  If the patient had a surgery or procedure, do they have any questions for a nurse?: No             PLAN                                                      Outpatient Plan:  Follow up with vascular surgery on Tuesday 12/26/23, we will call you and schedule the appointment    Future Appointments   Date Time Provider Department Center   12/26/2023 12:30 PM Miryam Carrasco APRN [facility]   1/2/2024 12:30 PM Miryam Carrasco APRN [facility]   1/15/2024 12:30 PM Miryam Carrasco APRN [facility]   1/22/2024  9:00 AM Nidia Garcia MD [facility]   1/25/2024  8:00 AM Kay Cobb MD [facility]   1/29/2024  1:00 PM Miryam Carrasco APRN [facility]         For any urgent concerns, please contact our 24 hour nurse triage line: [phone] ([phone])       CHE Houser  [phone]  [facility]

## 2023-12-22 ENCOUNTER — TELEPHONE (OUTPATIENT)
Dept: INFECTIOUS DISEASES | Facility: CLINIC | Age: 68
End: 2023-12-22
Payer: COMMERCIAL

## 2023-12-22 LAB
BACTERIA BONE ANAEROBE+AEROBE CULT: NO GROWTH
BACTERIA BONE ANAEROBE+AEROBE CULT: NO GROWTH
BACTERIA BONE ANAEROBE+AEROBE CULT: NORMAL
BACTERIA BONE ANAEROBE+AEROBE CULT: NORMAL
BACTERIA TISS BX CULT: NORMAL
BACTERIA WND CULT: NORMAL

## 2023-12-26 ENCOUNTER — OFFICE VISIT (OUTPATIENT)
Dept: VASCULAR SURGERY | Facility: CLINIC | Age: 68
End: 2023-12-26
Payer: COMMERCIAL

## 2023-12-26 VITALS — DIASTOLIC BLOOD PRESSURE: 84 MMHG | HEART RATE: 68 BPM | OXYGEN SATURATION: 97 % | SYSTOLIC BLOOD PRESSURE: 146 MMHG

## 2023-12-26 DIAGNOSIS — S81.802D OPEN WOUND OF LEFT LOWER EXTREMITY, SUBSEQUENT ENCOUNTER: ICD-10-CM

## 2023-12-26 DIAGNOSIS — Z89.612 S/P AKA (ABOVE KNEE AMPUTATION) UNILATERAL, LEFT (H): Primary | ICD-10-CM

## 2023-12-26 PROCEDURE — 99024 POSTOP FOLLOW-UP VISIT: CPT | Performed by: NURSE PRACTITIONER

## 2023-12-26 PROCEDURE — 97605 NEG PRS WND THER DME<=50SQCM: CPT | Mod: 58 | Performed by: NURSE PRACTITIONER

## 2023-12-26 ASSESSMENT — PAIN SCALES - GENERAL: PAINLEVEL: NO PAIN (0)

## 2023-12-26 NOTE — NURSING NOTE
Chief Complaint   Patient presents with    Follow Up     The patient states his current wound vac is going well. The patient has not had OT or PT visit yet but it sounds like they will be by soon. They have had a nurse doing home visits lately though. The patient reports phantom feelings of his left leg but he denies any pains. [facility] needs to be [facility] in regards to covering his PICC line infusions.     Vitals were taken and medications reconciled.    Jaylan Medeiros, EMT  12:41 PM

## 2023-12-26 NOTE — LETTER
12/26/2023       RE: Alfredo Burnham  [address]  [place] [address]       Dear Colleague,    Thank you for referring your patient, Alfredo Burnham, to the [facility] [place] [facility]. Please see a copy of my visit note below.        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 Back to OR on 12/15/23 for left AKA stump wound I&D with antibiotic beads and new wound vac placement. Discharged to home on 12/19/2023 with home care and nursing for VAC changes. Presents with his wife and daughter to clinic for wound check and VAC exchange.     Wound with antibiotic beads intact, with granulation tissue on base. Moderate serous drainage.  Without signs of symptoms of gross infection.    Wound dimensions:  Length 10.5 cm  Depth 4 cm  Width 2.5 cm    Given VAC exchange today, okay to change next Thursday 12/28/2023, or Friday 12/29/2023. Patient has follow-up in clinic with me scheduled for 1/2/2024.    All questions were answered and support provided. He has our contact information and knows to reach out with any additional questions or concerns.       Miryam Carrasco, CNP  Vascular Surgery  Pager:  [phone]  [email or web address]  Send message or 10 digit call back number Securely via [employer] with the [employer] Web Console (learn more here)        HPI:  Alfredo Burnham is a 68 year old male recently discharged to home, returns for wound check and VAC exchange. [facility], noted [facility] with [facility] infusions him establishing care. Since going home patient was able to go out a couple of times, denies any out of proportion difficulty at home with transferring from bed to wheelchair and moving from bedroom to kitchen. Denies fevers or chills.  He has moderate serous drainage from his stump incision noted in VAC canister, however no gross infectious process. He receives wound care at home 3 times per week.    REVIEW OF SYSTEMS:    A 12 point ROS was reviewed and is negative except for what is listed above in HPI.    PHH:    Past Medical History:   Diagnosis Date    Hypertension 2/8/2011    Macular degeneration           Past Surgical History:   Procedure Laterality Date    AMPUTATE LEG ABOVE KNEE Left 10/17/2023    Procedure: AMPUTATION, ABOVE KNEE and Abdominal wound vac exchange;  Surgeon: Ash Boucher MBBS;  [facility]    AMPUTATE REVISION STUMP LOWER EXTREMITY Left 12/8/2023    Procedure: REVISION, AMPUTATION STUMP, LEFT LOWER EXTREMITY;  Surgeon: Boris Lowe;  [facility]    AMPUTATE REVISION STUMP LOWER EXTREMITY Left 12/15/2023    Procedure: IRRIGATION AND DEBRIDEMENT OF LEFT AMPUTATION STUMP, WITH CEMENT ANTIBIOTIC BEAD INSERTION X3;  Surgeon: Boris Lowe;  [facility]    CLOSE SECONDARY WOUND ABDOMEN N/A 10/20/2023    Procedure: Delayed primary subcutaneous abdominal closure;  Surgeon: Boris Lowe;  [facility]    INCISION AND DRAINAGE ABDOMEN WASHOUT, COMBINED N/A 09/25/2023    Procedure: abdominal washout, combined, repacking,  abthera placement;  Surgeon: Ash Boucher  JAVIER;  [facility]    INCISION AND DRAINAGE ABDOMEN WASHOUT, COMBINED N/A 09/26/2023    Procedure: Abdominal washout, Retroperitoneal closure, washout left lower extremity wound;  Surgeon: Boris Lowe;  [facility]    INCISION AND DRAINAGE LOWER EXTREMITY, COMBINED Left 12/1/2023    Procedure: irrigation and drainage of collection left above knee amputation stump;  Surgeon: Ash Boucher MBBS;  [facility]    IR CVC NON TUNNEL LINE REMOVAL  10/18/2023    IR CVC TUNNEL PLACEMENT > 5 YRS OF AGE  10/18/2023    IR CVC TUNNEL REMOVAL RIGHT  11/02/2023    IR OR ANGIOGRAM  09/25/2023    IRRIGATION AND DEBRIDEMENT ABDOMEN WASHOUT, COMBINED N/A 09/29/2023    Procedure: exploration of  ABDOMINAL CAVITY, placement of abthera;  Surgeon: Boris Lowe;  [facility]    IRRIGATION AND DEBRIDEMENT ABDOMEN WASHOUT, COMBINED N/A 10/02/2023    Procedure: Exploratory laparotomy, abominal closure, wound vac change left lower extremity;  Surgeon: Jonathan Fry MD;  [facility]    IRRIGATION AND DEBRIDEMENT LOWER EXTREMITY, COMBINED Left 09/29/2023    Procedure: exploration of left lower extremity, partial closure of left lower extremity, wound vac placement;  Surgeon: Boris Lowe;  [facility]    LAPAROTOMY EXPLORATORY N/A 09/25/2023    Procedure: Laparotomy exploratory;  Surgeon: Roger Shirley MD;  [facility]    PICC INSERTION - DOUBLE LUMEN Right 12/04/2023    43-1cm, Basilic vein    REPAIR ANEURYSM ABDOMINAL AORTA N/A 09/24/2023    Procedure: Resection of Ruptureed Abdominal Aneurysm, Aortic biliary bypass with 20 x 10 mm Bifurcated hemagard graft, Temporary Abdominal Closure, Endovascular Balloon Inclusion of Aorta;  Surgeon: Boris Lowe;  [facility]    SIGMOIDOSCOPY FLEXIBLE N/A 09/25/2023    Procedure: Sigmoidoscopy flexible;  Surgeon: Gabino Walker MD;  [facility]    THROMBECTOMY LOWER EXTREMITY Left 09/25/2023    Procedure: SFA,  popliteal, and tibial thromboembolectomy and four compartment fasciotomy;  Surgeon: Ash Boucher MBBS;  [facility]       ALLERGIES:  Penicillins and Cefepime    MEDS:    Current Outpatient Medications:     acetaminophen (TYLENOL) 325 MG tablet, Take 3 tablets (975 mg) by mouth every 8 hours as needed for mild pain, Disp: , Rfl:     amLODIPine (NORVASC) 10 MG tablet, Take 1 tablet (10 mg) by mouth daily for 360 days Hold for SBP<105, Disp: 90 tablet, Rfl: 3    apixaban ANTICOAGULANT (ELIQUIS) 5 MG tablet, Take 1 tablet (5 mg) by mouth 2 times daily for 90 days, Disp: 180 tablet, Rfl: 0    DAPTOmycin 700 mg, Inject 700 mg into the vein every 48 hours for 37 days, Disp: , Rfl:     docusate sodium (COLACE) 50 MG capsule, Take 1 capsule (50 mg) by mouth daily for 20 days Hold for lose stools, Disp: 20 capsule, Rfl: 0    melatonin 3 MG tablet, Take 1 tablet (3 mg) by mouth every evening for 30 days, Disp: 30 tablet, Rfl: 0    melatonin 3 MG tablet, Take 1 tablet (3 mg) by mouth every evening, Disp: , Rfl:     metoprolol tartrate (LOPRESSOR) 100 MG tablet, Take 1 tablet (100 mg) by mouth 2 times daily for 360 days Hold for SBP<100, Disp: 180 tablet, Rfl: 3    pantoprazole (PROTONIX) 40 MG EC tablet, Take 1 tablet (40 mg) by mouth every 24 hours for 360 days, Disp: 90 tablet, Rfl: 3    polyethylene glycol (MIRALAX) 17 GM/Dose powder, Take 17 g by mouth daily as needed Only take if constipated, Disp: 340 g, Rfl: 0    psyllium (METAMUCIL/KONSYL) Packet, Take 1 packet by mouth daily as needed for constipation Only take if constipated, Disp: 20 packet, Rfl: 0    QUEtiapine (SEROQUEL) 25 MG tablet, Take 3 tablets (75 mg) by mouth at bedtime for 360 days, Disp: 270 tablet, Rfl: 3    QUEtiapine (SEROQUEL) 25 MG tablet, Take 1 tablet (25 mg) by mouth 2 times daily as needed For anxiety, Disp: 180 tablet, Rfl: 3    sennosides (SENOKOT) 8.6 MG tablet, Take 1 tablet by mouth 2 times daily as needed for constipation Only take if  constipated, Disp: 40 tablet, Rfl: 3    Vitamin D3 (CHOLECALCIFEROL) 25 mcg (1000 units) tablet, Take 2 tablets (50 mcg) by mouth every 24 hours for 360 days, Disp: 180 tablet, Rfl: 3    atorvastatin (LIPITOR) 10 MG tablet, Take 1 tablet (10 mg) by mouth every evening (Patient not taking: Reported on 12/26/2023), Disp: , Rfl:     SOCIAL HABITS:    History   Smoking Status    Every Day    Packs/day: 0.50    Types: Cigarettes   Smokeless Tobacco    Not on file     Social History    Substance and Sexual Activity      Alcohol use: Yes        Comment: 1-2/wk      History   Drug Use No       FAMILY HISTORY:    Family History   Problem Relation Age of Onset    Unknown/Adopted Mother     Heart Disease Mother     Arthritis Mother     Hypertension Brother     Blood Disease Sister     Psychotic Disorder Sister        PE:  BP (!) 146/84 (BP Location: Left arm, Patient Position: Chair, Cuff Size: Adult Regular)   Pulse 68   SpO2 97%   Wt Readings from Last 1 Encounters:   12/11/23 58.2 kg (128 lb 4.8 oz)     There is no height or weight on file to calculate BMI.    EXAM:  GENERAL: well-developed 68 year old male who appears his stated age  CARDIAC: normal   CHEST/LUNG: normal respiratory effort   MUSCULOSKELETAL: grossly normal and both lower extremities are intact, no lower extremity edema  NEUROLOGIC: focally intact, alert and oriented x 3  PSYCH: appropriate affect  VASCULAR: LLE stump incision healing well, with moderate drainage, VAC with appropriate suction. Antibiotic beads remain in stump.        Again, thank you for allowing me to participate in the care of your patient.      Sincerely,    ALTAGRACIA Figueroa CNP

## 2023-12-26 NOTE — PROGRESS NOTES
VASCULAR SURGERY PROGRESS NOTE    LOCATION: 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 Back to OR on 12/15/23 for left AKA stump wound I&D with antibiotic beads and new wound vac placement. Discharged to home on 12/19/2023 with home care and nursing for VAC changes. Presents with his wife and daughter to clinic for wound check and VAC exchange.     Wound with antibiotic beads intact, with granulation tissue on base. Moderate serous drainage.  Without signs of symptoms of gross infection.    Wound dimensions:  Length 10.5 cm  Depth 4 cm  Width 2.5 cm    Given VAC exchange today, okay to change next Thursday 12/28/2023, or Friday 12/29/2023. Patient has follow-up in clinic with me scheduled for 1/2/2024.    All questions were answered and support provided. He has our contact information and knows to reach out with any additional questions or concerns.       Miryam Carrasco, CNP  Vascular Surgery  Pager: [phone]  [email or web address]  Send message or 10 digit call back number Securely via [employer] with the [employer] Web Console (learn more here)        HPI:  Alfredo Burnham is a 68 year old male recently discharged to home, returns for wound check and VAC exchange. [facility], noted [facility] has been [facility]  [facility]ful with antibiotic infusions him establishing care. Since going home patient was able to go out a couple of times, denies any out of proportion difficulty at home with transferring from bed to wheelchair and moving from bedroom to kitchen. Denies fevers or chills.  He has moderate serous drainage from his stump incision noted in VAC canister, however no gross infectious process. He receives wound care at home 3 times per week.    REVIEW OF SYSTEMS:    A 12 point ROS was reviewed and is negative except for what is listed above in HPI.    PHH:    Past Medical History:   Diagnosis Date     Hypertension 2/8/2011     Macular degeneration           Past Surgical History:   Procedure Laterality Date     AMPUTATE LEG ABOVE KNEE Left 10/17/2023    Procedure: AMPUTATION, ABOVE KNEE and Abdominal wound vac exchange;  Surgeon: Ash Boucher MBBS;  [facility]     AMPUTATE REVISION STUMP LOWER EXTREMITY Left 12/8/2023    Procedure: REVISION, AMPUTATION STUMP, LEFT LOWER EXTREMITY;  Surgeon: Boris Lowe;  [facility]     AMPUTATE REVISION STUMP LOWER EXTREMITY Left 12/15/2023    Procedure: IRRIGATION AND DEBRIDEMENT OF LEFT AMPUTATION STUMP, WITH CEMENT ANTIBIOTIC BEAD INSERTION X3;  Surgeon: Boris Lowe;  [facility]     CLOSE SECONDARY WOUND ABDOMEN N/A 10/20/2023    Procedure: Delayed primary subcutaneous abdominal closure;  Surgeon: Boris Lowe;  [facility]     INCISION AND DRAINAGE ABDOMEN WASHOUT, COMBINED N/A 09/25/2023    Procedure: abdominal washout, combined, repacking,  abthera placement;  Surgeon: Ash Boucher MBBS;  [facility]     INCISION AND DRAINAGE ABDOMEN WASHOUT, COMBINED N/A 09/26/2023    Procedure: Abdominal washout, Retroperitoneal closure, washout left lower extremity wound;  Surgeon: Boris Lowe;  [facility]     INCISION AND DRAINAGE LOWER EXTREMITY, COMBINED Left 12/1/2023    Procedure: irrigation and drainage of collection left  above knee amputation stump;  Surgeon: Ash Boucher MBBS;  [facility]     IR CVC NON TUNNEL LINE REMOVAL  10/18/2023     IR CVC TUNNEL PLACEMENT > 5 YRS OF AGE  10/18/2023     IR CVC TUNNEL REMOVAL RIGHT  11/02/2023     IR OR ANGIOGRAM  09/25/2023     IRRIGATION AND DEBRIDEMENT ABDOMEN WASHOUT, COMBINED N/A 09/29/2023    Procedure: exploration of  ABDOMINAL CAVITY, placement of abthera;  Surgeon: Boris Lowe;  [facility]     IRRIGATION AND DEBRIDEMENT ABDOMEN WASHOUT, COMBINED N/A 10/02/2023    Procedure: Exploratory laparotomy, abominal closure, wound vac change left lower extremity;  Surgeon: Jonathan Fry MD;  [facility]     IRRIGATION AND DEBRIDEMENT LOWER EXTREMITY, COMBINED Left 09/29/2023    Procedure: exploration of left lower extremity, partial closure of left lower extremity, wound vac placement;  Surgeon: Boris Lowe;  [facility]     LAPAROTOMY EXPLORATORY N/A 09/25/2023    Procedure: Laparotomy exploratory;  Surgeon: Roger Shirley MD;  [facility]     PICC INSERTION - DOUBLE LUMEN Right 12/04/2023    43-1cm, Basilic vein     REPAIR ANEURYSM ABDOMINAL AORTA N/A 09/24/2023    Procedure: Resection of Ruptureed Abdominal Aneurysm, Aortic biliary bypass with 20 x 10 mm Bifurcated hemagard graft, Temporary Abdominal Closure, Endovascular Balloon Inclusion of Aorta;  Surgeon: Boris Lowe;  [facility]     SIGMOIDOSCOPY FLEXIBLE N/A 09/25/2023    Procedure: Sigmoidoscopy flexible;  Surgeon: Gabino Walker MD;  [facility]     THROMBECTOMY LOWER EXTREMITY Left 09/25/2023    Procedure: SFA, popliteal, and tibial thromboembolectomy and four compartment fasciotomy;  Surgeon: Ash Boucher MBBS;  [facility]       ALLERGIES:  Penicillins and Cefepime    MEDS:    Current Outpatient Medications:      acetaminophen (TYLENOL) 325 MG tablet, Take 3 tablets (975 mg) by mouth every 8 hours as needed for mild pain, Disp: , Rfl:       amLODIPine (NORVASC) 10 MG tablet, Take 1 tablet (10 mg) by mouth daily for 360 days Hold for SBP<105, Disp: 90 tablet, Rfl: 3     apixaban ANTICOAGULANT (ELIQUIS) 5 MG tablet, Take 1 tablet (5 mg) by mouth 2 times daily for 90 days, Disp: 180 tablet, Rfl: 0     DAPTOmycin 700 mg, Inject 700 mg into the vein every 48 hours for 37 days, Disp: , Rfl:      docusate sodium (COLACE) 50 MG capsule, Take 1 capsule (50 mg) by mouth daily for 20 days Hold for lose stools, Disp: 20 capsule, Rfl: 0     melatonin 3 MG tablet, Take 1 tablet (3 mg) by mouth every evening for 30 days, Disp: 30 tablet, Rfl: 0     melatonin 3 MG tablet, Take 1 tablet (3 mg) by mouth every evening, Disp: , Rfl:      metoprolol tartrate (LOPRESSOR) 100 MG tablet, Take 1 tablet (100 mg) by mouth 2 times daily for 360 days Hold for SBP<100, Disp: 180 tablet, Rfl: 3     pantoprazole (PROTONIX) 40 MG EC tablet, Take 1 tablet (40 mg) by mouth every 24 hours for 360 days, Disp: 90 tablet, Rfl: 3     polyethylene glycol (MIRALAX) 17 GM/Dose powder, Take 17 g by mouth daily as needed Only take if constipated, Disp: 340 g, Rfl: 0     psyllium (METAMUCIL/KONSYL) Packet, Take 1 packet by mouth daily as needed for constipation Only take if constipated, Disp: 20 packet, Rfl: 0     QUEtiapine (SEROQUEL) 25 MG tablet, Take 3 tablets (75 mg) by mouth at bedtime for 360 days, Disp: 270 tablet, Rfl: 3     QUEtiapine (SEROQUEL) 25 MG tablet, Take 1 tablet (25 mg) by mouth 2 times daily as needed For anxiety, Disp: 180 tablet, Rfl: 3     sennosides (SENOKOT) 8.6 MG tablet, Take 1 tablet by mouth 2 times daily as needed for constipation Only take if constipated, Disp: 40 tablet, Rfl: 3     Vitamin D3 (CHOLECALCIFEROL) 25 mcg (1000 units) tablet, Take 2 tablets (50 mcg) by mouth every 24 hours for 360 days, Disp: 180 tablet, Rfl: 3     atorvastatin (LIPITOR) 10 MG tablet, Take 1 tablet (10 mg) by mouth every evening (Patient not taking: Reported on 12/26/2023), Disp: ,  Rfl:     SOCIAL HABITS:    History   Smoking Status     Every Day     Packs/day: 0.50     Types: Cigarettes   Smokeless Tobacco     Not on file     Social History    Substance and Sexual Activity      Alcohol use: Yes        Comment: 1-2/wk      History   Drug Use No       FAMILY HISTORY:    Family History   Problem Relation Age of Onset     Unknown/Adopted Mother      Heart Disease Mother      Arthritis Mother      Hypertension Brother      Blood Disease Sister      Psychotic Disorder Sister        PE:  BP (!) 146/84 (BP Location: Left arm, Patient Position: Chair, Cuff Size: Adult Regular)   Pulse 68   SpO2 97%   Wt Readings from Last 1 Encounters:   12/11/23 58.2 kg (128 lb 4.8 oz)     There is no height or weight on file to calculate BMI.    EXAM:  GENERAL: well-developed 68 year old male who appears his stated age  CARDIAC: normal   CHEST/LUNG: normal respiratory effort   MUSCULOSKELETAL: grossly normal and both lower extremities are intact, no lower extremity edema  NEUROLOGIC: focally intact, alert and oriented x 3  PSYCH: appropriate affect  VASCULAR: LLE stump incision healing well, with moderate drainage, VAC with appropriate suction. Antibiotic beads remain in stump.

## 2023-12-26 NOTE — PATIENT INSTRUCTIONS
Thank you so much for choosing us for your care. It was a pleasure to see you at the vascular clinic today.     Follow-up recommendations: We will see you back in 1 week. Please do not hesitate to reach out to us in the meantime with any concerns.         If you have any questions, please contact our clinic directly at [phone] and ask for the nurse. We also encourage the use of [employer] to communicate with your HealthCare Provider.        If you have an urgent need after business hours (8:00 am to 4:30 pm) please call [phone], option 4, and ask for the vascular attending on call. For non-urgent after hours needs, please call the vascular nurse at [phone] and leave a detailed voicemail. For scheduling needs, please call our clinic directly at [phone].

## 2023-12-28 ENCOUNTER — CARE COORDINATION (OUTPATIENT)
Dept: CARE COORDINATION | Facility: CLINIC | Age: 68
End: 2023-12-28
Payer: COMMERCIAL

## 2023-12-28 NOTE — PROGRESS NOTES
Care Management Follow Up    Received call from [facility] regarding home care PT/OT/RN services through [employer].  [facility] with [facility] for [facility] however [facility] is not an agency that the [facility] is contracted with.  The [facility] would not be able to secure additional home care orders.  Per chart review noted [facility] coordinated home care services with [facility].  Pt inpatient hospitalization was managed primarily by Vascular Surgery  Noted pt has OP follow up with [facility] on 1/2/24.  Plan for [facility] to reach out to [facility] regarding home PT/OT orders.      Rose Mary Perales, RN BSN, PHN, ACM-RN  7A RN Care Coordinator  Phone: [phone]  Pager [phone]    To contact the weekend RNCC  [place] (0800 - 1630) Saturday and Sunday    Units: 5A,5B,5C [phone]    Units: 6A, [phone]    Units 6B, 6C, 6D [phone]    Units: 7A, 7B, 7C, 7D, [phone]    [facility] ([phone]) Saturday and Sunday  Units: 5 Ortho, 5MS & WB ED Pager: [phone]  Units: 6MS, 8A & 10 ICU  Pager [phone]    12/28/2023 9:58 AM

## 2023-12-29 ENCOUNTER — LAB REQUISITION (OUTPATIENT)
Dept: LAB | Facility: CLINIC | Age: 68
End: 2023-12-29
Payer: COMMERCIAL

## 2023-12-29 LAB
ALBUMIN SERPL BCG-MCNC: 3.9 G/DL (ref 3.5–5.2)
ALP SERPL-CCNC: 79 U/L (ref 40–150)
ALT SERPL W P-5'-P-CCNC: 21 U/L (ref 0–70)
ANION GAP SERPL CALCULATED.3IONS-SCNC: 11 MMOL/L (ref 7–15)
AST SERPL W P-5'-P-CCNC: 19 U/L (ref 0–45)
BASOPHILS # BLD AUTO: 0.1 10E3/UL (ref 0–0.2)
BASOPHILS NFR BLD AUTO: 1 %
BILIRUB SERPL-MCNC: 0.2 MG/DL
BUN SERPL-MCNC: 28.9 MG/DL (ref 8–23)
CALCIUM SERPL-MCNC: 9.5 MG/DL (ref 8.8–10.2)
CHLORIDE SERPL-SCNC: 107 MMOL/L (ref 98–107)
CREAT SERPL-MCNC: 2.02 MG/DL (ref 0.67–1.17)
CREAT SERPL-MCNC: 2.02 MG/DL (ref 0.67–1.17)
DEPRECATED HCO3 PLAS-SCNC: 21 MMOL/L (ref 22–29)
EGFRCR SERPLBLD CKD-EPI 2021: 35 ML/MIN/1.73M2
EGFRCR SERPLBLD CKD-EPI 2021: 35 ML/MIN/1.73M2
EOSINOPHIL # BLD AUTO: 2 10E3/UL (ref 0–0.7)
EOSINOPHIL NFR BLD AUTO: 27 %
ERYTHROCYTE [DISTWIDTH] IN BLOOD BY AUTOMATED COUNT: 15.6 % (ref 10–15)
GLUCOSE SERPL-MCNC: 93 MG/DL (ref 70–99)
HCT VFR BLD AUTO: 33.1 % (ref 40–53)
HGB BLD-MCNC: 10.6 G/DL (ref 13.3–17.7)
HOLD SPECIMEN: NORMAL
IMM GRANULOCYTES # BLD: 0.1 10E3/UL
IMM GRANULOCYTES NFR BLD: 1 %
LYMPHOCYTES # BLD AUTO: 1.7 10E3/UL (ref 0.8–5.3)
LYMPHOCYTES NFR BLD AUTO: 22 %
MCH RBC QN AUTO: 31.5 PG (ref 26.5–33)
MCHC RBC AUTO-ENTMCNC: 32 G/DL (ref 31.5–36.5)
MCV RBC AUTO: 99 FL (ref 78–100)
MONOCYTES # BLD AUTO: 0.6 10E3/UL (ref 0–1.3)
MONOCYTES NFR BLD AUTO: 9 %
NEUTROPHILS # BLD AUTO: 3 10E3/UL (ref 1.6–8.3)
NEUTROPHILS NFR BLD AUTO: 40 %
NRBC # BLD AUTO: 0 10E3/UL
NRBC BLD AUTO-RTO: 0 /100
PLATELET # BLD AUTO: 138 10E3/UL (ref 150–450)
POTASSIUM SERPL-SCNC: 4.2 MMOL/L (ref 3.4–5.3)
PROT SERPL-MCNC: 6.5 G/DL (ref 6.4–8.3)
RBC # BLD AUTO: 3.36 10E6/UL (ref 4.4–5.9)
SODIUM SERPL-SCNC: 139 MMOL/L (ref 135–145)
WBC # BLD AUTO: 7.4 10E3/UL (ref 4–11)

## 2023-12-29 PROCEDURE — 85025 COMPLETE CBC W/AUTO DIFF WBC: CPT | Performed by: INTERNAL MEDICINE

## 2023-12-29 PROCEDURE — 80053 COMPREHEN METABOLIC PANEL: CPT | Performed by: INTERNAL MEDICINE

## 2024-01-02 ENCOUNTER — OFFICE VISIT (OUTPATIENT)
Dept: VASCULAR SURGERY | Facility: CLINIC | Age: 69
End: 2024-01-02
Payer: COMMERCIAL

## 2024-01-02 VITALS — HEART RATE: 71 BPM | DIASTOLIC BLOOD PRESSURE: 86 MMHG | OXYGEN SATURATION: 100 % | SYSTOLIC BLOOD PRESSURE: 142 MMHG

## 2024-01-02 DIAGNOSIS — S81.802D OPEN WOUND OF LEFT LOWER EXTREMITY, SUBSEQUENT ENCOUNTER: Primary | ICD-10-CM

## 2024-01-02 PROCEDURE — 99024 POSTOP FOLLOW-UP VISIT: CPT | Performed by: NURSE PRACTITIONER

## 2024-01-02 ASSESSMENT — PAIN SCALES - GENERAL: PAINLEVEL: NO PAIN (0)

## 2024-01-02 NOTE — NURSING NOTE
Chief Complaint   Patient presents with    Follow Up     The patient notes some pain to his left leg which is more worse than usual. The patient denies any other questions or concerns at this time.      Vitals were taken and medications reconciled.    Jaylan Medeiros, EMT  12:39 PM

## 2024-01-02 NOTE — LETTER
1/2/2024       RE: Alfredo Burnham  [address]  [place] [address]     Dear Colleague,    Thank you for referring your patient, Alfredo Burnham, to the [facility] [place] [facility]. Please see a copy of my visit note below.        VASCULAR SURGERY PROGRESS NOTE    LOCATION: [facility]     Alfredo Burnham  Medical Record #:  [id number]  YOB: 1955  Age:  68 year old     Date of Service: 1/2/2024    PRIMARY CARE PROVIDER: Adriana [facility] [place]    Reason for visit: Wound check and VAC replacement    IMPRESSION / RECOMMENDATION:   Alfredo Burnham is a very pleasant 68-year-old patient status post multiple washouts of LLE stump. Continues to have antibiotic beads.  Stump with moderate serous drainage in canister.    VAC changed during this visit, with granulation tissue on base.  Length 10.5 cm  Depth 4 cm  Width 2.5 cm    Continue with VAC changes Monday, Wednesday, Friday.  Follow-up with me already scheduled for 1/15/2024.    All questions were answered and support provided. He has our contact information and knows to reach out with any additional questions or concerns.     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 Back to OR on 12/15/23 for left AKA stump wound I&D with antibiotic beads and new wound vac  placement. Discharged to home on 12/19/2023 with home care and nursing for VAC changes.  Denies fevers chills, followed by infectious disease on an outpatient basis given ongoing IV antibiotics.  He has an upcoming appointment on 1/22/2024.    REVIEW OF SYSTEMS:    A 12 point ROS was reviewed and is negative except for what is listed above in HPI.    PHH:    Past Medical History:   Diagnosis Date    Hypertension 2/8/2011    Macular degeneration           Past Surgical History:   Procedure Laterality Date    AMPUTATE LEG ABOVE KNEE Left 10/17/2023    Procedure: AMPUTATION, ABOVE KNEE and Abdominal wound vac exchange;  Surgeon: Ash Boucher MBBS;  [facility]    AMPUTATE REVISION STUMP LOWER EXTREMITY Left 12/8/2023    Procedure: REVISION, AMPUTATION STUMP, LEFT LOWER EXTREMITY;  Surgeon: Boris Lowe;  [facility]    AMPUTATE REVISION STUMP LOWER EXTREMITY Left 12/15/2023    Procedure: IRRIGATION AND DEBRIDEMENT OF LEFT AMPUTATION STUMP, WITH CEMENT ANTIBIOTIC BEAD INSERTION X3;  Surgeon: Boris Lowe;  [facility]    CLOSE SECONDARY WOUND ABDOMEN N/A 10/20/2023    Procedure: Delayed primary subcutaneous abdominal closure;  Surgeon: Boris Lowe;  [facility]    INCISION AND DRAINAGE ABDOMEN WASHOUT, COMBINED N/A 09/25/2023    Procedure: abdominal washout, combined, repacking,  abthera placement;  Surgeon: Ash Boucher MBBS;  [facility]    INCISION AND DRAINAGE ABDOMEN WASHOUT, COMBINED N/A 09/26/2023    Procedure: Abdominal washout, Retroperitoneal closure, washout left lower extremity wound;  Surgeon: Boris Lowe;  [facility]    INCISION AND DRAINAGE LOWER EXTREMITY, COMBINED Left 12/1/2023    Procedure: irrigation and drainage of collection left above knee amputation stump;  Surgeon: Ash Boucher MBBS;  [facility]    IR CVC NON TUNNEL LINE REMOVAL  10/18/2023    IR CVC TUNNEL PLACEMENT > 5 YRS OF AGE  10/18/2023    IR CVC TUNNEL REMOVAL  RIGHT  11/02/2023    IR OR ANGIOGRAM  09/25/2023    IRRIGATION AND DEBRIDEMENT ABDOMEN WASHOUT, COMBINED N/A 09/29/2023    Procedure: exploration of  ABDOMINAL CAVITY, placement of abthera;  Surgeon: Boris Lowe;  [facility]    IRRIGATION AND DEBRIDEMENT ABDOMEN WASHOUT, COMBINED N/A 10/02/2023    Procedure: Exploratory laparotomy, abominal closure, wound vac change left lower extremity;  Surgeon: Jonathan Fry MD;  [facility]    IRRIGATION AND DEBRIDEMENT LOWER EXTREMITY, COMBINED Left 09/29/2023    Procedure: exploration of left lower extremity, partial closure of left lower extremity, wound vac placement;  Surgeon: Boris Lowe;  [facility]    LAPAROTOMY EXPLORATORY N/A 09/25/2023    Procedure: Laparotomy exploratory;  Surgeon: Roger Shirley MD;  [facility]    PICC INSERTION - DOUBLE LUMEN Right 12/04/2023    43-1cm, Basilic vein    REPAIR ANEURYSM ABDOMINAL AORTA N/A 09/24/2023    Procedure: Resection of Ruptureed Abdominal Aneurysm, Aortic biliary bypass with 20 x 10 mm Bifurcated hemagard graft, Temporary Abdominal Closure, Endovascular Balloon Inclusion of Aorta;  Surgeon: Boris Lowe;  [facility]    SIGMOIDOSCOPY FLEXIBLE N/A 09/25/2023    Procedure: Sigmoidoscopy flexible;  Surgeon: Gabino Walker MD;  [facility]    THROMBECTOMY LOWER EXTREMITY Left 09/25/2023    Procedure: SFA, popliteal, and tibial thromboembolectomy and four compartment fasciotomy;  Surgeon: Ash Boucher MBBS;  [facility]       ALLERGIES:  Penicillins and Cefepime    MEDS:    Current Outpatient Medications:     acetaminophen (TYLENOL) 325 MG tablet, Take 3 tablets (975 mg) by mouth every 8 hours as needed for mild pain, Disp: , Rfl:     amLODIPine (NORVASC) 10 MG tablet, Take 1 tablet (10 mg) by mouth daily for 360 days Hold for SBP<105, Disp: 90 tablet, Rfl: 3    apixaban ANTICOAGULANT (ELIQUIS) 5 MG tablet, Take 1 tablet (5 mg) by mouth 2 times daily for  90 days, Disp: 180 tablet, Rfl: 0    DAPTOmycin 700 mg, Inject 700 mg into the vein every 48 hours for 37 days, Disp: , Rfl:     docusate sodium (COLACE) 50 MG capsule, Take 1 capsule (50 mg) by mouth daily for 20 days Hold for lose stools, Disp: 20 capsule, Rfl: 0    melatonin 3 MG tablet, Take 1 tablet (3 mg) by mouth every evening for 30 days, Disp: 30 tablet, Rfl: 0    melatonin 3 MG tablet, Take 1 tablet (3 mg) by mouth every evening, Disp: , Rfl:     metoprolol tartrate (LOPRESSOR) 100 MG tablet, Take 1 tablet (100 mg) by mouth 2 times daily for 360 days Hold for SBP<100, Disp: 180 tablet, Rfl: 3    pantoprazole (PROTONIX) 40 MG EC tablet, Take 1 tablet (40 mg) by mouth every 24 hours for 360 days, Disp: 90 tablet, Rfl: 3    polyethylene glycol (MIRALAX) 17 GM/Dose powder, Take 17 g by mouth daily as needed Only take if constipated, Disp: 340 g, Rfl: 0    psyllium (METAMUCIL/KONSYL) Packet, Take 1 packet by mouth daily as needed for constipation Only take if constipated, Disp: 20 packet, Rfl: 0    QUEtiapine (SEROQUEL) 25 MG tablet, Take 3 tablets (75 mg) by mouth at bedtime for 360 days, Disp: 270 tablet, Rfl: 3    QUEtiapine (SEROQUEL) 25 MG tablet, Take 1 tablet (25 mg) by mouth 2 times daily as needed For anxiety, Disp: 180 tablet, Rfl: 3    sennosides (SENOKOT) 8.6 MG tablet, Take 1 tablet by mouth 2 times daily as needed for constipation Only take if constipated, Disp: 40 tablet, Rfl: 3    Vitamin D3 (CHOLECALCIFEROL) 25 mcg (1000 units) tablet, Take 2 tablets (50 mcg) by mouth every 24 hours for 360 days, Disp: 180 tablet, Rfl: 3    atorvastatin (LIPITOR) 10 MG tablet, Take 1 tablet (10 mg) by mouth every evening (Patient not taking: Reported on 12/26/2023), Disp: , Rfl:     SOCIAL HABITS:    History   Smoking Status    Every Day    Packs/day: 0.50    Types: Cigarettes   Smokeless Tobacco    Not on file     Social History    Substance and Sexual Activity      Alcohol use: Yes        Comment: 1-2/wk       History   Drug Use No       FAMILY HISTORY:    Family History   Problem Relation Age of Onset    Unknown/Adopted Mother     Heart Disease Mother     Arthritis Mother     Hypertension Brother     Blood Disease Sister     Psychotic Disorder Sister        PE:  BP (!) 142/86 (BP Location: Left arm, Patient Position: Chair, Cuff Size: Adult Regular)   Pulse 71   SpO2 100%   Wt Readings from Last 1 Encounters:   12/11/23 58.2 kg (128 lb 4.8 oz)     There is no height or weight on file to calculate BMI.    EXAM:  GENERAL: well-developed 68 year old male who appears his stated age  CARDIAC: normal   CHEST/LUNG: normal respiratory effort   MUSCULOSKELETAL: grossly normal and both lower extremities are intact, no lower extremity edema  NEUROLOGIC: focally intact, alert and oriented x 3  PSYCH: appropriate affect  VASCULAR: Stump is soft, incision healing well, with moderate serous drainage, no signs or symptoms of infection, wound base with granulation tissue. Small segment of sloughing on the lateral aspect of incision.             Again, thank you for allowing me to participate in the care of your patient.      Sincerely,    ALTAGRACIA Figueroa CNP

## 2024-01-03 ENCOUNTER — LAB REQUISITION (OUTPATIENT)
Dept: LAB | Facility: CLINIC | Age: 69
End: 2024-01-03
Payer: COMMERCIAL

## 2024-01-03 DIAGNOSIS — T81.42XA INFECTION FOLLOWING A PROCEDURE, DEEP INCISIONAL SURGICAL SITE, INITIAL ENCOUNTER: ICD-10-CM

## 2024-01-03 LAB
ANION GAP SERPL CALCULATED.3IONS-SCNC: 12 MMOL/L (ref 7–15)
BASOPHILS # BLD AUTO: 0 10E3/UL (ref 0–0.2)
BASOPHILS NFR BLD AUTO: 1 %
BUN SERPL-MCNC: 32.6 MG/DL (ref 8–23)
CALCIUM SERPL-MCNC: 9.5 MG/DL (ref 8.8–10.2)
CHLORIDE SERPL-SCNC: 108 MMOL/L (ref 98–107)
CK SERPL-CCNC: 14 U/L (ref 39–308)
CREAT SERPL-MCNC: 2.2 MG/DL (ref 0.67–1.17)
DEPRECATED HCO3 PLAS-SCNC: 18 MMOL/L (ref 22–29)
EGFRCR SERPLBLD CKD-EPI 2021: 31 ML/MIN/1.73M2
EOSINOPHIL # BLD AUTO: 2.2 10E3/UL (ref 0–0.7)
EOSINOPHIL NFR BLD AUTO: 26 %
ERYTHROCYTE [DISTWIDTH] IN BLOOD BY AUTOMATED COUNT: 15.6 % (ref 10–15)
GLUCOSE SERPL-MCNC: 119 MG/DL (ref 70–99)
HCT VFR BLD AUTO: 28.7 % (ref 40–53)
HGB BLD-MCNC: 9.6 G/DL (ref 13.3–17.7)
IMM GRANULOCYTES # BLD: 0.1 10E3/UL
IMM GRANULOCYTES NFR BLD: 1 %
LYMPHOCYTES # BLD AUTO: 1.5 10E3/UL (ref 0.8–5.3)
LYMPHOCYTES NFR BLD AUTO: 17 %
MCH RBC QN AUTO: 32.1 PG (ref 26.5–33)
MCHC RBC AUTO-ENTMCNC: 33.4 G/DL (ref 31.5–36.5)
MCV RBC AUTO: 96 FL (ref 78–100)
MONOCYTES # BLD AUTO: 0.9 10E3/UL (ref 0–1.3)
MONOCYTES NFR BLD AUTO: 11 %
NEUTROPHILS # BLD AUTO: 3.8 10E3/UL (ref 1.6–8.3)
NEUTROPHILS NFR BLD AUTO: 44 %
NRBC # BLD AUTO: 0 10E3/UL
NRBC BLD AUTO-RTO: 0 /100
PLATELET # BLD AUTO: 115 10E3/UL (ref 150–450)
POTASSIUM SERPL-SCNC: 4.1 MMOL/L (ref 3.4–5.3)
RBC # BLD AUTO: 2.99 10E6/UL (ref 4.4–5.9)
SODIUM SERPL-SCNC: 138 MMOL/L (ref 135–145)
WBC # BLD AUTO: 8.5 10E3/UL (ref 4–11)

## 2024-01-03 PROCEDURE — 85048 AUTOMATED LEUKOCYTE COUNT: CPT | Performed by: INTERNAL MEDICINE

## 2024-01-03 PROCEDURE — 80048 BASIC METABOLIC PNL TOTAL CA: CPT | Performed by: INTERNAL MEDICINE

## 2024-01-03 PROCEDURE — 82550 ASSAY OF CK (CPK): CPT | Performed by: INTERNAL MEDICINE

## 2024-01-03 NOTE — PROGRESS NOTES
VASCULAR SURGERY PROGRESS NOTE    LOCATION: [facility]     Alfredo Burnham  Medical Record #:  [id number]  YOB: 1955  Age:  68 year old     Date of Service: 1/2/2024    PRIMARY CARE PROVIDER: Yuki Wright    Reason for visit: Wound check and VAC replacement    IMPRESSION / RECOMMENDATION:   Alfredo Burnham is a very pleasant 68-year-old patient status post multiple washouts of LLE stump. Continues to have antibiotic beads.  Stump with moderate serous drainage in canister.    VAC changed during this visit, with granulation tissue on base.  Length 10.5 cm  Depth 4 cm  Width 2.5 cm    Continue with VAC changes Monday, Wednesday, Friday.  Follow-up with me already scheduled for 1/15/2024.    All questions were answered and support provided. He has our contact information and knows to reach out with any additional questions or concerns.     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 Back to OR on 12/15/23 for left AKA stump wound I&D with antibiotic beads and new wound vac placement. Discharged to home on 12/19/2023 with home care and nursing for VAC changes.  Denies fevers chills, followed by infectious disease on an outpatient basis given ongoing IV antibiotics.  He has an upcoming appointment on 1/22/2024.    REVIEW OF SYSTEMS:    A 12 point ROS was reviewed and is negative except for what is  Theo Roblero is a 55 year old male, 3 months post ASCT for MCL. PET scan shows increased FDG-avid LAD. Marrow is normal however. I discussed these findings with him, and explained a biopsy would be helpful to establish etiology. If this is MCL, we could consider CD19 CAR T. We will again review the plan on 3/1/2021. All questions were answered in full.    Sam Zavala MD on 2/26/2021 at 1:25 PM           listed above in HPI.    PHH:    Past Medical History:   Diagnosis Date     Hypertension 2/8/2011     Macular degeneration           Past Surgical History:   Procedure Laterality Date     AMPUTATE LEG ABOVE KNEE Left 10/17/2023    Procedure: AMPUTATION, ABOVE KNEE and Abdominal wound vac exchange;  Surgeon: Ash Boucher MBBS;  [facility]     AMPUTATE REVISION STUMP LOWER EXTREMITY Left 12/8/2023    Procedure: REVISION, AMPUTATION STUMP, LEFT LOWER EXTREMITY;  Surgeon: Boris Lowe;  [facility]     AMPUTATE REVISION STUMP LOWER EXTREMITY Left 12/15/2023    Procedure: IRRIGATION AND DEBRIDEMENT OF LEFT AMPUTATION STUMP, WITH CEMENT ANTIBIOTIC BEAD INSERTION X3;  Surgeon: Boris Loew;  [facility]     CLOSE SECONDARY WOUND ABDOMEN N/A 10/20/2023    Procedure: Delayed primary subcutaneous abdominal closure;  Surgeon: Boris Lowe;  [facility]     INCISION AND DRAINAGE ABDOMEN WASHOUT, COMBINED N/A 09/25/2023    Procedure: abdominal washout, combined, repacking,  abthera placement;  Surgeon: Ash Boucher MBBS;  [facility]     INCISION AND DRAINAGE ABDOMEN WASHOUT, COMBINED N/A 09/26/2023    Procedure: Abdominal washout, Retroperitoneal closure, washout left lower extremity wound;  Surgeon: Boris Lowe;  [facility]     INCISION AND DRAINAGE LOWER EXTREMITY, COMBINED Left 12/1/2023    Procedure: irrigation and drainage of collection left above knee amputation stump;  Surgeon: Ash Boucher MBBS;  [facility]     IR CVC NON TUNNEL LINE REMOVAL  10/18/2023     IR CVC TUNNEL PLACEMENT > 5 YRS OF AGE  10/18/2023     IR CVC TUNNEL REMOVAL RIGHT  11/02/2023     IR OR ANGIOGRAM  09/25/2023     IRRIGATION AND DEBRIDEMENT ABDOMEN WASHOUT, COMBINED N/A 09/29/2023    Procedure: exploration of  ABDOMINAL CAVITY, placement of abthera;  Surgeon: Boris Lowe;  [facility]     IRRIGATION AND DEBRIDEMENT ABDOMEN WASHOUT, COMBINED N/A 10/02/2023     Procedure: Exploratory laparotomy, abominal closure, wound vac change left lower extremity;  Surgeon: Jonathan Fry MD;  [facility]     IRRIGATION AND DEBRIDEMENT LOWER EXTREMITY, COMBINED Left 09/29/2023    Procedure: exploration of left lower extremity, partial closure of left lower extremity, wound vac placement;  Surgeon: Boris Lowe;  [facility]     LAPAROTOMY EXPLORATORY N/A 09/25/2023    Procedure: Laparotomy exploratory;  Surgeon: Roger Shirley MD;  [facility]     PICC INSERTION - DOUBLE LUMEN Right 12/04/2023    43-1cm, Basilic vein     REPAIR ANEURYSM ABDOMINAL AORTA N/A 09/24/2023    Procedure: Resection of Ruptureed Abdominal Aneurysm, Aortic biliary bypass with 20 x 10 mm Bifurcated hemagard graft, Temporary Abdominal Closure, Endovascular Balloon Inclusion of Aorta;  Surgeon: Boris Lowe;  [facility]     SIGMOIDOSCOPY FLEXIBLE N/A 09/25/2023    Procedure: Sigmoidoscopy flexible;  Surgeon: Gabino Walker MD;  [facility]     THROMBECTOMY LOWER EXTREMITY Left 09/25/2023    Procedure: SFA, popliteal, and tibial thromboembolectomy and four compartment fasciotomy;  Surgeon: Ash Boucher MBBS;  [facility]       ALLERGIES:  Penicillins and Cefepime    MEDS:    Current Outpatient Medications:      acetaminophen (TYLENOL) 325 MG tablet, Take 3 tablets (975 mg) by mouth every 8 hours as needed for mild pain, Disp: , Rfl:      amLODIPine (NORVASC) 10 MG tablet, Take 1 tablet (10 mg) by mouth daily for 360 days Hold for SBP<105, Disp: 90 tablet, Rfl: 3     apixaban ANTICOAGULANT (ELIQUIS) 5 MG tablet, Take 1 tablet (5 mg) by mouth 2 times daily for 90 days, Disp: 180 tablet, Rfl: 0     DAPTOmycin 700 mg, Inject 700 mg into the vein every 48 hours for 37 days, Disp: , Rfl:      docusate sodium (COLACE) 50 MG capsule, Take 1 capsule (50 mg) by mouth daily for 20 days Hold for lose stools, Disp: 20 capsule, Rfl: 0     melatonin 3 MG tablet, Take 1  tablet (3 mg) by mouth every evening for 30 days, Disp: 30 tablet, Rfl: 0     melatonin 3 MG tablet, Take 1 tablet (3 mg) by mouth every evening, Disp: , Rfl:      metoprolol tartrate (LOPRESSOR) 100 MG tablet, Take 1 tablet (100 mg) by mouth 2 times daily for 360 days Hold for SBP<100, Disp: 180 tablet, Rfl: 3     pantoprazole (PROTONIX) 40 MG EC tablet, Take 1 tablet (40 mg) by mouth every 24 hours for 360 days, Disp: 90 tablet, Rfl: 3     polyethylene glycol (MIRALAX) 17 GM/Dose powder, Take 17 g by mouth daily as needed Only take if constipated, Disp: 340 g, Rfl: 0     psyllium (METAMUCIL/KONSYL) Packet, Take 1 packet by mouth daily as needed for constipation Only take if constipated, Disp: 20 packet, Rfl: 0     QUEtiapine (SEROQUEL) 25 MG tablet, Take 3 tablets (75 mg) by mouth at bedtime for 360 days, Disp: 270 tablet, Rfl: 3     QUEtiapine (SEROQUEL) 25 MG tablet, Take 1 tablet (25 mg) by mouth 2 times daily as needed For anxiety, Disp: 180 tablet, Rfl: 3     sennosides (SENOKOT) 8.6 MG tablet, Take 1 tablet by mouth 2 times daily as needed for constipation Only take if constipated, Disp: 40 tablet, Rfl: 3     Vitamin D3 (CHOLECALCIFEROL) 25 mcg (1000 units) tablet, Take 2 tablets (50 mcg) by mouth every 24 hours for 360 days, Disp: 180 tablet, Rfl: 3     atorvastatin (LIPITOR) 10 MG tablet, Take 1 tablet (10 mg) by mouth every evening (Patient not taking: Reported on 12/26/2023), Disp: , Rfl:     SOCIAL HABITS:    History   Smoking Status     Every Day     Packs/day: 0.50     Types: Cigarettes   Smokeless Tobacco     Not on file     Social History    Substance and Sexual Activity      Alcohol use: Yes        Comment: 1-2/wk      History   Drug Use No       FAMILY HISTORY:    Family History   Problem Relation Age of Onset     Unknown/Adopted Mother      Heart Disease Mother      Arthritis Mother      Hypertension Brother      Blood Disease Sister      Psychotic Disorder Sister        PE:  BP (!) 142/86 (BP  Location: Left arm, Patient Position: Chair, Cuff Size: Adult Regular)   Pulse 71   SpO2 100%   Wt Readings from Last 1 Encounters:   12/11/23 58.2 kg (128 lb 4.8 oz)     There is no height or weight on file to calculate BMI.    EXAM:  GENERAL: well-developed 68 year old male who appears his stated age  CARDIAC: normal   CHEST/LUNG: normal respiratory effort   MUSCULOSKELETAL: grossly normal and both lower extremities are intact, no lower extremity edema  NEUROLOGIC: focally intact, alert and oriented x 3  PSYCH: appropriate affect  VASCULAR: Stump is soft, incision healing well, with moderate serous drainage, no signs or symptoms of infection, wound base with granulation tissue. Small segment of sloughing on the lateral aspect of incision.

## 2024-01-05 LAB
BACTERIA BONE ANAEROBE+AEROBE CULT: NO GROWTH
BACTERIA BONE ANAEROBE+AEROBE CULT: NO GROWTH
BACTERIA TISS BX CULT: NO GROWTH
BACTERIA TISS BX CULT: NO GROWTH

## 2024-01-10 ENCOUNTER — TELEPHONE (OUTPATIENT)
Dept: VASCULAR SURGERY | Facility: CLINIC | Age: 69
End: 2024-01-10

## 2024-01-10 ENCOUNTER — LAB REQUISITION (OUTPATIENT)
Dept: LAB | Facility: CLINIC | Age: 69
End: 2024-01-10
Payer: COMMERCIAL

## 2024-01-10 DIAGNOSIS — T81.42XA INFECTION FOLLOWING A PROCEDURE, DEEP INCISIONAL SURGICAL SITE, INITIAL ENCOUNTER: ICD-10-CM

## 2024-01-10 LAB
ALBUMIN SERPL BCG-MCNC: 3.5 G/DL (ref 3.5–5.2)
ALP SERPL-CCNC: 75 U/L (ref 40–150)
ALT SERPL W P-5'-P-CCNC: 16 U/L (ref 0–70)
ANION GAP SERPL CALCULATED.3IONS-SCNC: 12 MMOL/L (ref 7–15)
AST SERPL W P-5'-P-CCNC: 17 U/L (ref 0–45)
BASOPHILS # BLD AUTO: 0 10E3/UL (ref 0–0.2)
BASOPHILS NFR BLD AUTO: 1 %
BILIRUB SERPL-MCNC: <0.2 MG/DL
BUN SERPL-MCNC: 33.4 MG/DL (ref 8–23)
CALCIUM SERPL-MCNC: 9.3 MG/DL (ref 8.8–10.2)
CHLORIDE SERPL-SCNC: 108 MMOL/L (ref 98–107)
CK SERPL-CCNC: 13 U/L (ref 39–308)
CREAT SERPL-MCNC: 2.04 MG/DL (ref 0.67–1.17)
DEPRECATED HCO3 PLAS-SCNC: 19 MMOL/L (ref 22–29)
EGFRCR SERPLBLD CKD-EPI 2021: 34 ML/MIN/1.73M2
EOSINOPHIL # BLD AUTO: 2 10E3/UL (ref 0–0.7)
EOSINOPHIL NFR BLD AUTO: 23 %
ERYTHROCYTE [DISTWIDTH] IN BLOOD BY AUTOMATED COUNT: 14.6 % (ref 10–15)
GLUCOSE SERPL-MCNC: 142 MG/DL (ref 70–99)
HCT VFR BLD AUTO: 26.8 % (ref 40–53)
HGB BLD-MCNC: 8.9 G/DL (ref 13.3–17.7)
HOLD SPECIMEN: NORMAL
IMM GRANULOCYTES # BLD: 0.1 10E3/UL
IMM GRANULOCYTES NFR BLD: 1 %
LYMPHOCYTES # BLD AUTO: 1.5 10E3/UL (ref 0.8–5.3)
LYMPHOCYTES NFR BLD AUTO: 18 %
MCH RBC QN AUTO: 31.7 PG (ref 26.5–33)
MCHC RBC AUTO-ENTMCNC: 33.2 G/DL (ref 31.5–36.5)
MCV RBC AUTO: 95 FL (ref 78–100)
MONOCYTES # BLD AUTO: 0.9 10E3/UL (ref 0–1.3)
MONOCYTES NFR BLD AUTO: 10 %
NEUTROPHILS # BLD AUTO: 4.1 10E3/UL (ref 1.6–8.3)
NEUTROPHILS NFR BLD AUTO: 47 %
NRBC # BLD AUTO: 0 10E3/UL
NRBC BLD AUTO-RTO: 0 /100
PLATELET # BLD AUTO: 112 10E3/UL (ref 150–450)
POTASSIUM SERPL-SCNC: 3.5 MMOL/L (ref 3.4–5.3)
PROT SERPL-MCNC: 6.2 G/DL (ref 6.4–8.3)
RBC # BLD AUTO: 2.81 10E6/UL (ref 4.4–5.9)
SODIUM SERPL-SCNC: 139 MMOL/L (ref 135–145)
WBC # BLD AUTO: 8.6 10E3/UL (ref 4–11)

## 2024-01-10 PROCEDURE — 85025 COMPLETE CBC W/AUTO DIFF WBC: CPT | Performed by: INTERNAL MEDICINE

## 2024-01-10 PROCEDURE — 82550 ASSAY OF CK (CPK): CPT | Performed by: INTERNAL MEDICINE

## 2024-01-10 PROCEDURE — 80053 COMPREHEN METABOLIC PANEL: CPT | Performed by: INTERNAL MEDICINE

## 2024-01-10 NOTE — TELEPHONE ENCOUNTER
[facility] Call Center    Phone Message    May a detailed message be left on voicemail: yes     Reason for Call: Other: Home care nurse Jenni needs verbal orders for 3 more PRN visits to treat the patient's wound. Please call. Okay to leave a voicemail      Action Taken: Message routed to:  [facility]    Travel Screening: Not Applicable

## 2024-01-11 NOTE — TELEPHONE ENCOUNTER
Called and LVM for home care RN, Jenni. Noted per Miryam Carrasco NP, we can provide verbal orders for additional visits. Requested return call to clinic to discuss further if needed. Clinic telephone provided.     Tiffanie Paula LPN

## 2024-01-12 LAB
BACTERIA BONE ANAEROBE+AEROBE CULT: NO GROWTH
BACTERIA BONE ANAEROBE+AEROBE CULT: NO GROWTH
BACTERIA TISS BX CULT: NO GROWTH
BACTERIA WND CULT: NO GROWTH

## 2024-01-15 ENCOUNTER — OFFICE VISIT (OUTPATIENT)
Dept: VASCULAR SURGERY | Facility: CLINIC | Age: 69
End: 2024-01-15
Payer: COMMERCIAL

## 2024-01-15 VITALS — DIASTOLIC BLOOD PRESSURE: 90 MMHG | OXYGEN SATURATION: 100 % | SYSTOLIC BLOOD PRESSURE: 147 MMHG | HEART RATE: 67 BPM

## 2024-01-15 DIAGNOSIS — Z89.612 S/P AKA (ABOVE KNEE AMPUTATION) UNILATERAL, LEFT (H): Primary | ICD-10-CM

## 2024-01-15 PROCEDURE — 99024 POSTOP FOLLOW-UP VISIT: CPT | Performed by: NURSE PRACTITIONER

## 2024-01-15 RX ORDER — POLYETHYLENE GLYCOL 3350 17 G/17G
1 POWDER, FOR SOLUTION ORAL DAILY
Status: ON HOLD | COMMUNITY
End: 2024-02-05

## 2024-01-15 ASSESSMENT — PAIN SCALES - GENERAL: PAINLEVEL: NO PAIN (0)

## 2024-01-15 NOTE — PATIENT INSTRUCTIONS
Thank you so much for choosing us for your care. It was a pleasure to see you at the vascular clinic today.     Follow-up recommendations: We will see you back on 1/29/24.        If you have any questions, please contact our clinic directly at [phone] and ask for the nurse. We also encourage the use of [email or web address] to communicate with your HealthCare Provider.        If you have an urgent need after business hours (8:00 am to 4:30 pm) please call [phone], option 4, and ask for the vascular attending on call. For non-urgent after hours needs, please call the vascular nurse at [phone] and leave a detailed voicemail. For scheduling needs, please call our clinic directly at [phone].

## 2024-01-15 NOTE — NURSING NOTE
Chief Complaint   Patient presents with    Follow Up     Return vascular       Vitals were taken, medications reviewed.    Keke Ross, EMT   12:27 PM

## 2024-01-15 NOTE — LETTER
1/15/2024       RE: Alfredo Burnham  [address]  [place] [address]       Dear Colleague,    Thank you for referring your patient, Alfredo Burnham, to the [facility] [place] at [facility]. Please see a copy of my visit note below.        VASCULAR SURGERY PROGRESS NOTE    LOCATION: [facility]     Alfredo Burnham  Medical Record #:  [id number]  YOB: 1955  Age:  68 year old     Date of Service: 1/15/2024    PRIMARY CARE PROVIDER: [facility]    Reason for visit: Wound check    IMPRESSION / RECOMMENDATION:   Alfredo Burnham is a very pleasant 68-year-old patient status post multiple washouts of LLE stump. Continues to have antibiotic beads. Stump with now significantly reduced to minimal serous drainage. He noted pain on superior aspect of stump, upon palpation the pain is on the rectus femoris muscle, likely from tight ACE wraps (end of ACE wrap). He also has not received any heparin for his picc line which occluded requiring nursing assistance for trouble shooting. We will reach out to ID for approval and subsequently speak with home health to provide heparin to patient.     His LLE stump is healing well, without signs or symptoms of infection, he is afebrile, no leukocytosis, no chills. The depth of his wound is now at 1.5cm. The width is also 1.5cm and length at 12cm. VAC was changed today, granulation tissue on wound base. We will talk with Dr. Lowe about finalizing the LLE surgery plan given presence of antibiotics beads and upcoming end date of antibiotics on 1/26/24. His follow up appointment with us is on 1/29/24.      All questions were answered and support provided. He has our contact information and knows to reach out with any additional questions or concerns.     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 Back to OR on 12/15/23 for left AKA stump wound I&D with antibiotic beads and new wound vac placement. Discharged to home on 12/19/2023 with home care and nursing for VAC changes. Denies fevers chills, followed by infectious disease on an outpatient basis given ongoing IV antibiotics. Continues to work with PT, has good strength but gets tired easily. He has follow up appointment with me on 1/29/24.     REVIEW OF SYSTEMS:    A 12 point ROS was reviewed and is negative except for what is listed above in HPI.    PHH:    Past Medical History:   Diagnosis Date    Hypertension 2/8/2011    Macular degeneration           Past Surgical History:   Procedure Laterality Date    AMPUTATE LEG ABOVE KNEE Left 10/17/2023    Procedure: AMPUTATION, ABOVE KNEE and Abdominal wound vac exchange;  Surgeon: Ash Boucher MBBS;  [facility]: [facility]    AMPUTATE REVISION STUMP LOWER EXTREMITY Left 12/8/2023    Procedure: REVISION, AMPUTATION STUMP, LEFT LOWER EXTREMITY;  Surgeon: Boris Lowe;  [facility]    AMPUTATE REVISION STUMP LOWER EXTREMITY Left 12/15/2023    Procedure: IRRIGATION AND DEBRIDEMENT OF LEFT AMPUTATION STUMP, WITH CEMENT ANTIBIOTIC BEAD INSERTION X3;  Surgeon: Boris Lowe;  [facility]    CLOSE SECONDARY WOUND ABDOMEN N/A 10/20/2023    Procedure: Delayed primary subcutaneous abdominal closure;  Surgeon: Boris Lowe;  [facility]    INCISION AND DRAINAGE  ABDOMEN WASHOUT, COMBINED N/A 09/25/2023    Procedure: abdominal washout, combined, repacking,  abthera placement;  Surgeon: Ash Boucher MBBS;  [facility]    INCISION AND DRAINAGE ABDOMEN WASHOUT, COMBINED N/A 09/26/2023    Procedure: Abdominal washout, Retroperitoneal closure, washout left lower extremity wound;  Surgeon: Boris Lowe;  [facility]    INCISION AND DRAINAGE LOWER EXTREMITY, COMBINED Left 12/1/2023    Procedure: irrigation and drainage of collection left above knee amputation stump;  Surgeon: Ash Boucher MBBS;  [facility]    IR CVC NON TUNNEL LINE REMOVAL  10/18/2023    IR CVC TUNNEL PLACEMENT > 5 YRS OF AGE  10/18/2023    IR CVC TUNNEL REMOVAL RIGHT  11/02/2023    IR OR ANGIOGRAM  09/25/2023    IRRIGATION AND DEBRIDEMENT ABDOMEN WASHOUT, COMBINED N/A 09/29/2023    Procedure: exploration of  ABDOMINAL CAVITY, placement of abthera;  Surgeon: Boris Lowe;  [facility]    IRRIGATION AND DEBRIDEMENT ABDOMEN WASHOUT, COMBINED N/A 10/02/2023    Procedure: Exploratory laparotomy, abominal closure, wound vac change left lower extremity;  Surgeon: Jonathan Fry MD;  [facility]    IRRIGATION AND DEBRIDEMENT LOWER EXTREMITY, COMBINED Left 09/29/2023    Procedure: exploration of left lower extremity, partial closure of left lower extremity, wound vac placement;  Surgeon: Boris Lowe;  [facility]    LAPAROTOMY EXPLORATORY N/A 09/25/2023    Procedure: Laparotomy exploratory;  Surgeon: Roger Shirley MD;  [facility]    PICC INSERTION - DOUBLE LUMEN Right 12/04/2023    43-1cm, Basilic vein    REPAIR ANEURYSM ABDOMINAL AORTA N/A 09/24/2023    Procedure: Resection of Ruptureed Abdominal Aneurysm, Aortic biliary bypass with 20 x 10 mm Bifurcated hemagard graft, Temporary Abdominal Closure, Endovascular Balloon Inclusion of Aorta;  Surgeon: Boris Lowe;  [facility]    SIGMOIDOSCOPY FLEXIBLE N/A 09/25/2023    Procedure:  Sigmoidoscopy flexible;  Surgeon: Gabino Walker MD;  [facility]    THROMBECTOMY LOWER EXTREMITY Left 09/25/2023    Procedure: SFA, popliteal, and tibial thromboembolectomy and four compartment fasciotomy;  Surgeon: Ash Boucher MBBS;  [facility]       ALLERGIES:  Penicillins and Cefepime    MEDS:    Current Outpatient Medications:     amLODIPine (NORVASC) 10 MG tablet, Take 1 tablet (10 mg) by mouth daily for 360 days Hold for SBP<105, Disp: 90 tablet, Rfl: 3    apixaban ANTICOAGULANT (ELIQUIS) 5 MG tablet, Take 1 tablet (5 mg) by mouth 2 times daily for 90 days, Disp: 180 tablet, Rfl: 0    atorvastatin (LIPITOR) 10 MG tablet, Take 1 tablet (10 mg) by mouth every evening (Patient not taking: Reported on 12/26/2023), Disp: , Rfl:     DAPTOmycin 700 mg, Inject 700 mg into the vein every 48 hours for 37 days, Disp: , Rfl:     melatonin 3 MG tablet, Take 1 tablet (3 mg) by mouth every evening for 30 days, Disp: 30 tablet, Rfl: 0    melatonin 3 MG tablet, Take 1 tablet (3 mg) by mouth every evening, Disp: , Rfl:     metoprolol tartrate (LOPRESSOR) 100 MG tablet, Take 1 tablet (100 mg) by mouth 2 times daily for 360 days Hold for SBP<100, Disp: 180 tablet, Rfl: 3    pantoprazole (PROTONIX) 40 MG EC tablet, Take 1 tablet (40 mg) by mouth every 24 hours for 360 days, Disp: 90 tablet, Rfl: 3    QUEtiapine (SEROQUEL) 25 MG tablet, Take 3 tablets (75 mg) by mouth at bedtime for 360 days, Disp: 270 tablet, Rfl: 3    QUEtiapine (SEROQUEL) 25 MG tablet, Take 1 tablet (25 mg) by mouth 2 times daily as needed For anxiety, Disp: 180 tablet, Rfl: 3    sennosides (SENOKOT) 8.6 MG tablet, Take 1 tablet by mouth 2 times daily as needed for constipation Only take if constipated, Disp: 40 tablet, Rfl: 3    Vitamin D3 (CHOLECALCIFEROL) 25 mcg (1000 units) tablet, Take 2 tablets (50 mcg) by mouth every 24 hours for 360 days, Disp: 180 tablet, Rfl: 3    SOCIAL HABITS:    History   Smoking Status    Every Day     Packs/day: 0.50    Types: Cigarettes   Smokeless Tobacco    Not on file     Social History    Substance and Sexual Activity      Alcohol use: Yes        Comment: 1-2/wk      History   Drug Use No       FAMILY HISTORY:    Family History   Problem Relation Age of Onset    Unknown/Adopted Mother     Heart Disease Mother     Arthritis Mother     Hypertension Brother     Blood Disease Sister     Psychotic Disorder Sister        PE:  There were no vitals taken for this visit.  Wt Readings from Last 1 Encounters:   12/11/23 128 lb 4.8 oz     There is no height or weight on file to calculate BMI.    EXAM:  GENERAL: well-developed 68 year old male who appears his stated age  CARDIAC: normal   CHEST/LUNG: normal respiratory effort   MUSCULOSKELETAL: grossly normal and both lower extremities are intact, no lower extremity edema  NEUROLOGIC: focally intact, alert and oriented x 3  PSYCH: appropriate affect  VASCULAR: see uploaded pictures, wound without signs or symptoms of infection.               LABS:      Sodium   Date Value Ref Range Status   01/10/2024 139 135 - 145 mmol/L Final     Comment:     Reference intervals for this test were updated on 09/26/2023 to more accurately reflect our healthy population. There may be differences in the flagging of prior results with similar values performed with this method. Interpretation of those prior results can be made in the context of the updated reference intervals.    01/03/2024 138 135 - 145 mmol/L Final     Comment:     Reference intervals for this test were updated on 09/26/2023 to more accurately reflect our healthy population. There may be differences in the flagging of prior results with similar values performed with this method. Interpretation of those prior results can be made in the context of the updated reference intervals.    12/29/2023 139 135 - 145 mmol/L Final     Comment:     Reference intervals for this test were updated on 09/26/2023 to more accurately reflect  our healthy population. There may be differences in the flagging of prior results with similar values performed with this method. Interpretation of those prior results can be made in the context of the updated reference intervals.    02/08/2011 138 133 - 144 mmol/L Final   02/18/2009 139 133 - 144 mmol/L Final     Urea Nitrogen   Date Value Ref Range Status   01/10/2024 33.4 (H) 8.0 - 23.0 mg/dL Final   01/03/2024 32.6 (H) 8.0 - 23.0 mg/dL Final   12/29/2023 28.9 (H) 8.0 - 23.0 mg/dL Final   02/08/2011 17 7 - 30 mg/dL Final   02/18/2009 14 7 - 30 mg/dL Final     Hemoglobin   Date Value Ref Range Status   01/10/2024 8.9 (L) 13.3 - 17.7 g/dL Final   01/03/2024 9.6 (L) 13.3 - 17.7 g/dL Final   12/29/2023 10.6 (L) 13.3 - 17.7 g/dL Final   02/18/2009 15.1 13.3 - 17.7 g/dL Final     Platelet Count   Date Value Ref Range Status   01/10/2024 112 (L) 150 - 450 10e3/uL Final   01/03/2024 115 (L) 150 - 450 10e3/uL Final   12/29/2023 138 (L) 150 - 450 10e3/uL Final   02/18/2009 157 150 - 450 10e9/L Final     INR   Date Value Ref Range Status   12/06/2023 1.27 (H) 0.85 - 1.15 Final   10/25/2023 1.24 (H) 0.85 - 1.15 Final   10/25/2023 1.24 (H) 0.85 - 1.15 Final   10/15/2009 0.9  Final   02/18/2009 0.92 0.86 - 1.14 Final         Again, thank you for allowing me to participate in the care of your patient.      Sincerely,    ALTAGRACIA Figueroa CNP

## 2024-01-15 NOTE — PROGRESS NOTES
VASCULAR SURGERY PROGRESS NOTE    LOCATION: [facility]     Alfredo Burnham  Medical Record #:  [id number]  YOB: 1955  Age:  68 year old     Date of Service: 1/15/2024    PRIMARY CARE PROVIDER: Yuki Wright    Reason for visit: Wound check    IMPRESSION / RECOMMENDATION:   Alfredo Burnham is a very pleasant 68-year-old patient status post multiple washouts of LLE stump. Continues to have antibiotic beads. Stump with now significantly reduced to minimal serous drainage. He noted pain on superior aspect of stump, upon palpation the pain is on the rectus femoris muscle, likely from tight ACE wraps (end of ACE wrap). He also has not received any heparin for his picc line which occluded requiring nursing assistance for trouble shooting. We will reach out to ID for approval and subsequently speak with home health to provide heparin to patient.     His LLE stump is healing well, without signs or symptoms of infection, he is afebrile, no leukocytosis, no chills. The depth of his wound is now at 1.5cm. The width is also 1.5cm and length at 12cm. VAC was changed today, granulation tissue on wound base. We will talk with Dr. Lowe about finalizing the LLE surgery plan given presence of antibiotics beads and upcoming end date of antibiotics on 1/26/24. His follow up appointment with us is on 1/29/24.      All questions were answered and support provided. He has our contact information and knows to reach out with any additional questions or concerns.     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 Back to OR on 12/15/23 for left AKA stump wound I&D with antibiotic beads and new wound vac placement. Discharged to home on 12/19/2023 with home care and nursing for VAC changes. Denies fevers chills, followed by infectious disease on an outpatient basis given ongoing IV antibiotics. Continues to work with PT, has good strength but gets tired easily. He has follow up appointment with me on 1/29/24.     REVIEW OF SYSTEMS:    A 12 point ROS was reviewed and is negative except for what is listed above in HPI.    PHH:    Past Medical History:   Diagnosis Date    Hypertension 2/8/2011    Macular degeneration           Past Surgical History:   Procedure Laterality Date    AMPUTATE LEG ABOVE KNEE Left 10/17/2023    Procedure: AMPUTATION, ABOVE KNEE and Abdominal wound vac exchange;  Surgeon: Ash Boucher MBBS;  [facility]    AMPUTATE REVISION STUMP LOWER EXTREMITY Left 12/8/2023    Procedure: REVISION, AMPUTATION STUMP, LEFT LOWER EXTREMITY;  Surgeon: Boris Lowe;  [facility]    AMPUTATE REVISION STUMP LOWER EXTREMITY Left 12/15/2023    Procedure: IRRIGATION AND DEBRIDEMENT OF LEFT AMPUTATION STUMP, WITH CEMENT ANTIBIOTIC BEAD INSERTION X3;  Surgeon: Boris Lowe;  [facility]    CLOSE SECONDARY WOUND ABDOMEN N/A 10/20/2023    Procedure: Delayed primary subcutaneous abdominal closure;  Surgeon: Boris Lowe;  [facility]    INCISION AND DRAINAGE ABDOMEN WASHOUT, COMBINED N/A 09/25/2023    Procedure: abdominal washout, combined, repacking,  abthera placement;  Surgeon: Ash Boucher MBBS;  [facility]    INCISION AND DRAINAGE ABDOMEN WASHOUT, COMBINED N/A 09/26/2023    Procedure: Abdominal washout, Retroperitoneal closure, washout left lower extremity wound;  Surgeon:  Boris Lowe;  [facility]    INCISION AND DRAINAGE LOWER EXTREMITY, COMBINED Left 12/1/2023    Procedure: irrigation and drainage of collection left above knee amputation stump;  Surgeon: Ash Boucher MBBS;  [facility]    IR CVC NON TUNNEL LINE REMOVAL  10/18/2023    IR CVC TUNNEL PLACEMENT > 5 YRS OF AGE  10/18/2023    IR CVC TUNNEL REMOVAL RIGHT  11/02/2023    IR OR ANGIOGRAM  09/25/2023    IRRIGATION AND DEBRIDEMENT ABDOMEN WASHOUT, COMBINED N/A 09/29/2023    Procedure: exploration of  ABDOMINAL CAVITY, placement of abthera;  Surgeon: Boris Lowe;  [facility]    IRRIGATION AND DEBRIDEMENT ABDOMEN WASHOUT, COMBINED N/A 10/02/2023    Procedure: Exploratory laparotomy, abominal closure, wound vac change left lower extremity;  Surgeon: Jonathan Fry MD;  [facility]    IRRIGATION AND DEBRIDEMENT LOWER EXTREMITY, COMBINED Left 09/29/2023    Procedure: exploration of left lower extremity, partial closure of left lower extremity, wound vac placement;  Surgeon: Boris Lowe;  [facility]    LAPAROTOMY EXPLORATORY N/A 09/25/2023    Procedure: Laparotomy exploratory;  Surgeon: Roger Shirley MD;  [facility]    PICC INSERTION - DOUBLE LUMEN Right 12/04/2023    43-1cm, Basilic vein    REPAIR ANEURYSM ABDOMINAL AORTA N/A 09/24/2023    Procedure: Resection of Ruptureed Abdominal Aneurysm, Aortic biliary bypass with 20 x 10 mm Bifurcated hemagard graft, Temporary Abdominal Closure, Endovascular Balloon Inclusion of Aorta;  Surgeon: Boris Lowe;  [facility]    SIGMOIDOSCOPY FLEXIBLE N/A 09/25/2023    Procedure: Sigmoidoscopy flexible;  Surgeon: Gabino Walker MD;  [facility]    THROMBECTOMY LOWER EXTREMITY Left 09/25/2023    Procedure: SFA, popliteal, and tibial thromboembolectomy and four compartment fasciotomy;  Surgeon: Ash Boucher MBBS;  [facility]       ALLERGIES:  Penicillins and Cefepime    MEDS:    Current Outpatient  Medications:     amLODIPine (NORVASC) 10 MG tablet, Take 1 tablet (10 mg) by mouth daily for 360 days Hold for SBP<105, Disp: 90 tablet, Rfl: 3    apixaban ANTICOAGULANT (ELIQUIS) 5 MG tablet, Take 1 tablet (5 mg) by mouth 2 times daily for 90 days, Disp: 180 tablet, Rfl: 0    atorvastatin (LIPITOR) 10 MG tablet, Take 1 tablet (10 mg) by mouth every evening (Patient not taking: Reported on 12/26/2023), Disp: , Rfl:     DAPTOmycin 700 mg, Inject 700 mg into the vein every 48 hours for 37 days, Disp: , Rfl:     melatonin 3 MG tablet, Take 1 tablet (3 mg) by mouth every evening for 30 days, Disp: 30 tablet, Rfl: 0    melatonin 3 MG tablet, Take 1 tablet (3 mg) by mouth every evening, Disp: , Rfl:     metoprolol tartrate (LOPRESSOR) 100 MG tablet, Take 1 tablet (100 mg) by mouth 2 times daily for 360 days Hold for SBP<100, Disp: 180 tablet, Rfl: 3    pantoprazole (PROTONIX) 40 MG EC tablet, Take 1 tablet (40 mg) by mouth every 24 hours for 360 days, Disp: 90 tablet, Rfl: 3    QUEtiapine (SEROQUEL) 25 MG tablet, Take 3 tablets (75 mg) by mouth at bedtime for 360 days, Disp: 270 tablet, Rfl: 3    QUEtiapine (SEROQUEL) 25 MG tablet, Take 1 tablet (25 mg) by mouth 2 times daily as needed For anxiety, Disp: 180 tablet, Rfl: 3    sennosides (SENOKOT) 8.6 MG tablet, Take 1 tablet by mouth 2 times daily as needed for constipation Only take if constipated, Disp: 40 tablet, Rfl: 3    Vitamin D3 (CHOLECALCIFEROL) 25 mcg (1000 units) tablet, Take 2 tablets (50 mcg) by mouth every 24 hours for 360 days, Disp: 180 tablet, Rfl: 3    SOCIAL HABITS:    History   Smoking Status    Every Day    Packs/day: 0.50    Types: Cigarettes   Smokeless Tobacco    Not on file     Social History    Substance and Sexual Activity      Alcohol use: Yes        Comment: 1-2/wk      History   Drug Use No       FAMILY HISTORY:    Family History   Problem Relation Age of Onset    Unknown/Adopted Mother     Heart Disease Mother     Arthritis Mother      Hypertension Brother     Blood Disease Sister     Psychotic Disorder Sister        PE:  There were no vitals taken for this visit.  Wt Readings from Last 1 Encounters:   12/11/23 128 lb 4.8 oz     There is no height or weight on file to calculate BMI.    EXAM:  GENERAL: well-developed 68 year old male who appears his stated age  CARDIAC: normal   CHEST/LUNG: normal respiratory effort   MUSCULOSKELETAL: grossly normal and both lower extremities are intact, no lower extremity edema  NEUROLOGIC: focally intact, alert and oriented x 3  PSYCH: appropriate affect  VASCULAR: see uploaded pictures, wound without signs or symptoms of infection.               LABS:      Sodium   Date Value Ref Range Status   01/10/2024 139 135 - 145 mmol/L Final     Comment:     Reference intervals for this test were updated on 09/26/2023 to more accurately reflect our healthy population. There may be differences in the flagging of prior results with similar values performed with this method. Interpretation of those prior results can be made in the context of the updated reference intervals.    01/03/2024 138 135 - 145 mmol/L Final     Comment:     Reference intervals for this test were updated on 09/26/2023 to more accurately reflect our healthy population. There may be differences in the flagging of prior results with similar values performed with this method. Interpretation of those prior results can be made in the context of the updated reference intervals.    12/29/2023 139 135 - 145 mmol/L Final     Comment:     Reference intervals for this test were updated on 09/26/2023 to more accurately reflect our healthy population. There may be differences in the flagging of prior results with similar values performed with this method. Interpretation of those prior results can be made in the context of the updated reference intervals.    02/08/2011 138 133 - 144 mmol/L Final   02/18/2009 139 133 - 144 mmol/L Final     Urea Nitrogen   Date Value  Ref Range Status   01/10/2024 33.4 (H) 8.0 - 23.0 mg/dL Final   01/03/2024 32.6 (H) 8.0 - 23.0 mg/dL Final   12/29/2023 28.9 (H) 8.0 - 23.0 mg/dL Final   02/08/2011 17 7 - 30 mg/dL Final   02/18/2009 14 7 - 30 mg/dL Final     Hemoglobin   Date Value Ref Range Status   01/10/2024 8.9 (L) 13.3 - 17.7 g/dL Final   01/03/2024 9.6 (L) 13.3 - 17.7 g/dL Final   12/29/2023 10.6 (L) 13.3 - 17.7 g/dL Final   02/18/2009 15.1 13.3 - 17.7 g/dL Final     Platelet Count   Date Value Ref Range Status   01/10/2024 112 (L) 150 - 450 10e3/uL Final   01/03/2024 115 (L) 150 - 450 10e3/uL Final   12/29/2023 138 (L) 150 - 450 10e3/uL Final   02/18/2009 157 150 - 450 10e9/L Final     INR   Date Value Ref Range Status   12/06/2023 1.27 (H) 0.85 - 1.15 Final   10/25/2023 1.24 (H) 0.85 - 1.15 Final   10/25/2023 1.24 (H) 0.85 - 1.15 Final   10/15/2009 0.9  Final   02/18/2009 0.92 0.86 - 1.14 Final

## 2024-01-19 ENCOUNTER — LAB REQUISITION (OUTPATIENT)
Dept: LAB | Facility: CLINIC | Age: 69
End: 2024-01-19
Payer: COMMERCIAL

## 2024-01-19 DIAGNOSIS — T81.42XA INFECTION FOLLOWING A PROCEDURE, DEEP INCISIONAL SURGICAL SITE, INITIAL ENCOUNTER: ICD-10-CM

## 2024-01-19 LAB
ANION GAP SERPL CALCULATED.3IONS-SCNC: 12 MMOL/L (ref 7–15)
BASOPHILS # BLD AUTO: 0.1 10E3/UL (ref 0–0.2)
BASOPHILS NFR BLD AUTO: 1 %
BUN SERPL-MCNC: 31.6 MG/DL (ref 8–23)
CALCIUM SERPL-MCNC: 10.1 MG/DL (ref 8.8–10.2)
CHLORIDE SERPL-SCNC: 109 MMOL/L (ref 98–107)
CK SERPL-CCNC: 16 U/L (ref 39–308)
CREAT SERPL-MCNC: 1.95 MG/DL (ref 0.67–1.17)
DEPRECATED HCO3 PLAS-SCNC: 19 MMOL/L (ref 22–29)
EGFRCR SERPLBLD CKD-EPI 2021: 36 ML/MIN/1.73M2
EOSINOPHIL # BLD AUTO: 1.4 10E3/UL (ref 0–0.7)
EOSINOPHIL NFR BLD AUTO: 21 %
ERYTHROCYTE [DISTWIDTH] IN BLOOD BY AUTOMATED COUNT: 14.7 % (ref 10–15)
GLUCOSE SERPL-MCNC: 134 MG/DL (ref 70–99)
HCT VFR BLD AUTO: 29.5 % (ref 40–53)
HGB BLD-MCNC: 9.5 G/DL (ref 13.3–17.7)
HOLD SPECIMEN: NORMAL
HOLD SPECIMEN: NORMAL
IMM GRANULOCYTES # BLD: 0.1 10E3/UL
IMM GRANULOCYTES NFR BLD: 2 %
LYMPHOCYTES # BLD AUTO: 1.2 10E3/UL (ref 0.8–5.3)
LYMPHOCYTES NFR BLD AUTO: 17 %
MCH RBC QN AUTO: 30.9 PG (ref 26.5–33)
MCHC RBC AUTO-ENTMCNC: 32.2 G/DL (ref 31.5–36.5)
MCV RBC AUTO: 96 FL (ref 78–100)
MONOCYTES # BLD AUTO: 0.6 10E3/UL (ref 0–1.3)
MONOCYTES NFR BLD AUTO: 8 %
NEUTROPHILS # BLD AUTO: 3.7 10E3/UL (ref 1.6–8.3)
NEUTROPHILS NFR BLD AUTO: 51 %
NRBC # BLD AUTO: 0 10E3/UL
NRBC BLD AUTO-RTO: 0 /100
PLATELET # BLD AUTO: 136 10E3/UL (ref 150–450)
POTASSIUM SERPL-SCNC: 3.9 MMOL/L (ref 3.4–5.3)
RBC # BLD AUTO: 3.07 10E6/UL (ref 4.4–5.9)
SODIUM SERPL-SCNC: 140 MMOL/L (ref 135–145)
WBC # BLD AUTO: 7 10E3/UL (ref 4–11)

## 2024-01-19 PROCEDURE — 85025 COMPLETE CBC W/AUTO DIFF WBC: CPT | Performed by: INTERNAL MEDICINE

## 2024-01-19 PROCEDURE — 82550 ASSAY OF CK (CPK): CPT | Performed by: INTERNAL MEDICINE

## 2024-01-19 PROCEDURE — 80048 BASIC METABOLIC PNL TOTAL CA: CPT | Performed by: INTERNAL MEDICINE

## 2024-01-22 ENCOUNTER — OFFICE VISIT (OUTPATIENT)
Dept: INFECTIOUS DISEASES | Facility: CLINIC | Age: 69
End: 2024-01-22
Attending: STUDENT IN AN ORGANIZED HEALTH CARE EDUCATION/TRAINING PROGRAM
Payer: COMMERCIAL

## 2024-01-22 ENCOUNTER — TELEPHONE (OUTPATIENT)
Dept: INFECTIOUS DISEASES | Facility: CLINIC | Age: 69
End: 2024-01-22

## 2024-01-22 VITALS
DIASTOLIC BLOOD PRESSURE: 71 MMHG | SYSTOLIC BLOOD PRESSURE: 144 MMHG | WEIGHT: 130 LBS | HEIGHT: 67 IN | BODY MASS INDEX: 20.4 KG/M2 | HEART RATE: 76 BPM | TEMPERATURE: 97.8 F

## 2024-01-22 DIAGNOSIS — Q80.9 XERODERMA: ICD-10-CM

## 2024-01-22 DIAGNOSIS — Z51.81 THERAPEUTIC DRUG MONITORING: ICD-10-CM

## 2024-01-22 DIAGNOSIS — Z95.828 STATUS POST PICC CENTRAL LINE PLACEMENT: ICD-10-CM

## 2024-01-22 DIAGNOSIS — Z89.612 S/P ABOVE KNEE AMPUTATION, LEFT (H): ICD-10-CM

## 2024-01-22 DIAGNOSIS — T81.42XA INFECTION FOLLOWING A PROCEDURE, DEEP INCISIONAL SURGICAL SITE, INITIAL ENCOUNTER: Primary | ICD-10-CM

## 2024-01-22 DIAGNOSIS — N18.31 STAGE 3A CHRONIC KIDNEY DISEASE (H): ICD-10-CM

## 2024-01-22 DIAGNOSIS — Z79.2 ENCOUNTER FOR LONG-TERM (CURRENT) USE OF ANTIBIOTICS: ICD-10-CM

## 2024-01-22 PROCEDURE — 99215 OFFICE O/P EST HI 40 MIN: CPT | Mod: 24 | Performed by: STUDENT IN AN ORGANIZED HEALTH CARE EDUCATION/TRAINING PROGRAM

## 2024-01-22 PROCEDURE — G0463 HOSPITAL OUTPT CLINIC VISIT: HCPCS | Performed by: STUDENT IN AN ORGANIZED HEALTH CARE EDUCATION/TRAINING PROGRAM

## 2024-01-22 ASSESSMENT — PAIN SCALES - GENERAL: PAINLEVEL: NO PAIN (0)

## 2024-01-22 NOTE — TELEPHONE ENCOUNTER
[facility]    Phone Message    May a detailed message be left on voicemail: yes     Reason for Call: Other: Wound Update   Cheri would like to report a different drainage color on the recent dressing directly on the wound. States it's a yellowish color, no redness, scant amount. Please call back to follow-up. Thank you.     Action Taken: Other: ID    Travel Screening: Not Applicable

## 2024-01-22 NOTE — LETTER
1/22/2024       RE: Alfredo Burnham  [address]  [place] [address]     Dear Colleague,    Thank you for referring your patient, Alfredo Burnham, to the [facility] INFECTIOUS DISEASE CLINIC [place] at [facility]. Please see a copy of my visit note below.      [facility] INFECTIOUS DISEASE CLINIC [address] [address]  Phone: [phone]  Fax: [phone]    Patient:  Alfredo Burnham, Date of birth 1955  Date of Visit:  01/22/2024  Referring Provider Nidia Garcia MD  Reason for visit: Post hospital discharge follow up regarding LLE stump infection.     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 Likely plan for another debridement.    Intra-op cxs 12/1 - Enterococcus faecium VRE  Non surgical wound cxs 11/29, VRE (E.faecium), Daptomycin DSS (EVELIO 3), S- linezolid, tigecycline  LLE ischemia S/p attempted thrombectomy, AKA on 10/17/23   Ruptured pararenal AAA c/b shock and colonic ischemia, s/p open AAA repair (9/24/23), Hemagard Dacron graft; abdominal wall closure 10/2/23   CKD stage 3  RUE PICC, placed on 12/4/2023  Xeroderma, causing pruritus     Discussion:  67 yo M with recent prolonged hospitalization for AAA s/p open repair c/b shock, colonic and LLE ischemia s/p AKA complicated by VRE osteomyelitis of stump, daptomycin DSS (EVELIO 3), S- linezolid, tigecycline. Last debridement 12/15/23 with gent beads placed, wound vac in place. Then pathology did not show osteomyelitis, intra op cultures remained negative. Discharged to home and with plan to return for eventual wound closure. Total duration of  antibiotic is 6 weeks dated from final debridement of stump (12/15).     On today's visit, overall doing well. Labs are assuring, CK level wnl while on daptomycin. Stump revision with wound closure is yet to be scheduled. Per review of pictures uploaded on recent vascular surgery clinic visit on 1/15, appears healthy w/o signs of acute infection. Writer reached out to vascular surgery team, accordingly, revision surgery is scheduled on 2/2/2024. Recommend to continue iv Daptomycin until surgery, and continue until can ensure post op stump site is well healing.     RUE PICC site with oozing of blood started this morning. Notified [facility] team to address it with dressing change later today with wound vac change.     Encourage to reach out to PCP regarding xeroderma for symptomatic treatment and continue to apply topical as prescribed by PCP.     Recommendations:  Continue iv Daptomycin 12mg/kg/day (renally dosed), until stump revision with wound closure with vascular surgery, tentatively scheduled on 2/2/2024.   Relayed to [facility] via in-basket message  Monitor weekly CBC, CMP, CK level, CRP   Needs PICC dressing change, relayed to [facility]   Encourage to follow up with PCP regarding pruritus related to xeroderma      52 minutes spent by me on the date of the encounter doing chart review, history and exam, documentation and further activities per the note.        History of Present Illness    Pertinent history obtain from: chart review, the patient, and patient's partner/spouse    Alfredo presents for follow up visit. No fever, chills, sweating. Does not some numbness/tingling just above dressing of stump on intermittent basis, which believes nerve pain related to stump. Overall continues to improve. Notes good appetite and diet. Another bothersome issue is generalized itching, whereby [facility]. Mentions of bloody oozing at PICC insertion site.     Specialty Problems          Infectious Diseases     Infection following a procedure, deep incisional surgical site, initial encounter            Physical Exam    Vital signs:  There were no vitals taken for this visit.    GENERAL: alert and no distress  NECK: no adenopathy, no asymmetry, masses, or scars  RESP: lungs clear to auscultation - no rales, rhonchi or wheezes  CV: regular rate and rhythm, normal S1 S2, no S3 or S4, no murmur, click or rub, no peripheral edema  ABDOMEN: soft, nontender, no hepatosplenomegaly, no masses and bowel sounds normal  MS: s/p left AKA stump with wound vac in place  SKIN: xeroderma, generalized    RUE PICC w/o bloody oozing at insertion site.     Data  Laboratory data and imaging listed below was reviewed prior to this encounter.     CK level wnl     Microbiology:    Culture   Date Value Ref Range Status   12/15/2023 No anaerobic organisms isolated  Final   12/15/2023 No Growth  Final   12/15/2023 No Growth  Final   12/15/2023 No anaerobic organisms isolated  Final   12/15/2023 No Growth  Final   12/15/2023 No Growth  Final   12/15/2023 No anaerobic organisms isolated  Final   12/15/2023 No Growth  Final   12/15/2023 No Growth  Final   12/15/2023 No anaerobic organisms isolated  Final   12/15/2023 No Growth  Final   12/15/2023 No Growth  Final   12/08/2023 No anaerobic organisms isolated  Final   12/08/2023 No Growth  Final   12/08/2023 1+ Enterococcus faecium VRE (A)  Final     Comment:     Susceptibilities done on previous cultures   12/08/2023 No anaerobic organisms isolated  Final   12/08/2023 Isolated in broth only Enterococcus faecium VRE (A)  Final     Comment:     On day 3 of incubation  Not isolated or reported on routine aerobic culture  Susceptibilities done on previous cultures   12/08/2023 No Growth  Final   12/08/2023 No Growth  Final   12/08/2023 No anaerobic organisms isolated  Final   12/08/2023 No Growth  Final   12/08/2023 Isolated in broth only Enterococcus faecium VRE (A)  Corrected     Comment:     On day 2  of incubation  Susceptibilities done on previous cultures  Corrected result: Previously reported as Enterococcus avium (VRE) on 12/12/2023 at  7:29 AM CST.   12/08/2023 No anaerobic organisms isolated  Final   12/08/2023 No Growth  Final   12/08/2023 2+ Enterococcus faecium VRE (A)  Final   12/08/2023 2+ Staphylococcus epidermidis (A)  Final     Comment:     Susceptibilities not routinely done, refer to antibiogram to view typical susceptibility profiles   12/01/2023 No anaerobic organisms isolated  Final   12/01/2023 1+ Enterococcus faecium VRE (A)  Final     Comment:     Susceptibilities done on previous cultures   11/29/2023 1+ Enterococcus faecium VRE (A)  Final   11/29/2023 No Growth  Final   11/29/2023 No Growth  Final   10/25/2023 No Growth  Final   10/25/2023 No Growth  Final   10/17/2023 2+ Staphylococcus epidermidis (A)  Final     Comment:     Susceptibilities not routinely done, refer to antibiogram to view typical susceptibility profiles   10/17/2023 1+ Candida albicans (A)  Final     Comment:     Susceptibilities not routinely done, refer to antibiogram to view typical susceptibility profiles   10/14/2023 No Growth  Final   10/14/2023 No Growth  Final   10/09/2023 No Growth  Final   10/09/2023 No Growth  Final   10/01/2023 2+ Candida albicans (A)  Final     Comment:     Susceptibilities not routinely done, refer to antibiogram to view typical susceptibility profiles   10/01/2023 2+ Debbi krusei (A)  Final     Comment:     Susceptibilities not routinely done, refer to antibiogram to view typical susceptibility profiles   10/01/2023 1+ Normal hakan  Final   10/01/2023 No Growth  Final   10/01/2023 No Growth  Final   09/28/2023 3+ Candida albicans (A)  Final     Comment:     Susceptibilities not routinely done, refer to antibiogram to view typical susceptibility profiles   09/28/2023 1+ Aspergillus fumigatus complex (A)  Final   09/28/2023 3+ Normal hakan  Final     Urine Studies:    Recent Labs   Lab  Test 10/01/23  1049 09/30/23  1029 09/30/23  0648   LEUKEST Negative Negative Negative   WBCU 7* 7* 12*     Hematology Studies:    Recent Labs   Lab Test 01/19/24  1030 01/10/24  1711 01/03/24  1430 12/29/23  1127 12/20/23  1200 12/18/23  0555 10/27/23  1255 10/27/23  0548 10/26/23  1051 10/26/23  0700 10/24/23  0756 10/24/23  0651 10/23/23  0719 10/23/23  0555 10/22/23  1309 10/22/23  0422 10/21/23  0354   WBC 7.0 8.6 8.5 7.4 10.7 5.8   < > 11.5*   < > 13.2*   < > 12.4*  --  12.6*  --  11.3* 13.9*   ANEU  --   --   --   --   --   --   --  8.1  --  9.4*  --  9.5*  --  9.3*  --  8.4* 9.3*   AEOS  --   --   --   --   --   --   --  3.0*  --  2.4*  --  1.9*  --  2.4*  --  2.0* 1.8*   HGB 9.5* 8.9* 9.6* 10.6* 9.1* 8.2*   < > 8.4*   < > 8.4*   < > 5.8*   < > 6.8*   < > 7.1* 8.0*   MCV 96 95 96 99 97 99   < > 94   < > 96   < > 95  --  96  --  94 90   * 112* 115* 138* 133* 96*   < > 124*   < > 128*   < > 142*  --  185  --  167 159    < > = values in this interval not displayed.      Metabolic Studies:   Recent Labs   Lab Test 01/19/24  1030 01/10/24  1711 01/03/24  1430 12/29/23  1127 12/20/23  1200    139 138 139 138   POTASSIUM 3.9 3.5 4.1 4.2 4.6   CHLORIDE 109* 108* 108* 107 108*   CO2 19* 19* 18* 21* 22   BUN 31.6* 33.4* 32.6* 28.9* 34.0*   CR 1.95* 2.04* 2.20* 2.02*  2.02* 1.79*   GFRESTIMATED 36* 34* 31* 35*  35* 40*     Hepatic Studies:   Recent Labs   Lab Test 01/10/24  1711 12/29/23  1127 12/08/23  0732 10/30/23  0651 10/29/23  0401 10/28/23  0525   BILITOTAL <0.2 0.2 0.3 0.5 0.4 0.5   ALKPHOS 75 79 71 132* 128 133*   ALBUMIN 3.5 3.9 3.5 3.1* 3.0* 3.1*   AST 17 19 18 24 25 34   ALT 16 21 16 15 19 25      Left femur proximal bone biopsy 12/15/2023:  -Small fragments of benign bone with no definite evidence of osteomyelitis.       Nidia Garcia MD

## 2024-01-22 NOTE — NURSING NOTE
"Chief Complaint   Patient presents with    [facility] Hospitalization     Per Justino Leuck  Patient notice blood \"around [place]\"        BP (!) 144/71   Pulse 76   Temp 97.8  F (36.6  C) (Oral)   Ht 1.702 m (5' 7\")   Wt 59 kg (130 lb)   BMI 20.36 kg/m    Zeina Hensley CMA on 1/22/2024 at 8:56 AM    "

## 2024-01-22 NOTE — PROGRESS NOTES
[facility] INFECTIOUS DISEASE CLINIC [address] [address]  Phone: [phone]  Fax: [phone]    Patient:  Alfredo Burnham, Date of birth 1955  Date of Visit:  01/22/2024  Referring Provider Nidia Garcia MD  Reason for visit: Post hospital discharge follow up regarding LLE stump infection.     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 Likely plan for another debridement.    Intra-op cxs 12/1 - Enterococcus faecium VRE  Non surgical wound cxs 11/29, VRE (E.faecium), Daptomycin DSS (EVELIO 3), S- linezolid, tigecycline  LLE ischemia S/p attempted thrombectomy, AKA on 10/17/23   Ruptured pararenal AAA c/b shock and colonic ischemia, s/p open AAA repair (9/24/23), Hemagard Dacron graft; abdominal wall closure 10/2/23   CKD stage 3  RUE PICC, placed on 12/4/2023  Xeroderma, causing pruritus     Discussion:  67 yo M with recent prolonged hospitalization for AAA s/p open repair c/b shock, colonic and LLE ischemia s/p AKA complicated by VRE osteomyelitis of stump, daptomycin DSS (EVELIO 3), S- linezolid, tigecycline. Last debridement 12/15/23 with gent beads placed, wound vac in place. Then pathology did not show osteomyelitis, intra op cultures remained negative. Discharged to home and with plan to return for eventual wound closure. Total duration of antibiotic is 6 weeks dated from final debridement of stump (12/15).     On today's visit, overall doing well. Labs are assuring, CK level wnl while on daptomycin. Stump revision with wound closure is yet to be scheduled. Per review of pictures uploaded on recent vascular surgery clinic visit on 1/15, appears healthy w/o signs of acute infection.  Writer reached out to vascular surgery team, accordingly, revision surgery is scheduled on 2/2/2024. Recommend to continue iv Daptomycin until surgery, and continue until can ensure post op stump site is well healing.     RUE PICC site with oozing of blood started this morning. Notified [facility] team to address it with dressing change later today with wound vac change.     Encourage to reach out to PCP regarding xeroderma for symptomatic treatment and continue to apply topical as prescribed by PCP.     Recommendations:  Continue iv Daptomycin 12mg/kg/day (renally dosed), until stump revision with wound closure with vascular surgery, tentatively scheduled on 2/2/2024.   Relayed to [facility] via in-basket message  Monitor weekly CBC, CMP, CK level, CRP   Needs PICC dressing change, relayed to [facility]   Encourage to follow up with PCP regarding pruritus related to xeroderma      52 minutes spent by me on the date of the encounter doing chart review, history and exam, documentation and further activities per the note.        History of Present Illness     Pertinent history obtain from: chart review, the patient, and patient's partner/spouse    Alfredo presents for follow up visit. No fever, chills, sweating. Does not some numbness/tingling just above dressing of stump on intermittent basis, which believes nerve pain related to stump. Overall continues to improve. Notes good appetite and diet. Another bothersome issue is generalized itching, whereby [facility] topical [facility]. Mentions of bloody oozing at PICC insertion site.     Specialty Problems          Infectious Diseases    Infection following a procedure, deep incisional surgical site, initial encounter            Physical Exam     Vital signs:  There were no vitals taken for this visit.    GENERAL: alert and no distress  NECK: no adenopathy, no asymmetry, masses, or scars  RESP: lungs clear to auscultation - no rales, rhonchi or wheezes  CV: regular rate and  rhythm, normal S1 S2, no S3 or S4, no murmur, click or rub, no peripheral edema  ABDOMEN: soft, nontender, no hepatosplenomegaly, no masses and bowel sounds normal  MS: s/p left AKA stump with wound vac in place  SKIN: xeroderma, generalized    RUE PICC w/o bloody oozing at insertion site.     Data   Laboratory data and imaging listed below was reviewed prior to this encounter.     CK level wnl     Microbiology:    Culture   Date Value Ref Range Status   12/15/2023 No anaerobic organisms isolated  Final   12/15/2023 No Growth  Final   12/15/2023 No Growth  Final   12/15/2023 No anaerobic organisms isolated  Final   12/15/2023 No Growth  Final   12/15/2023 No Growth  Final   12/15/2023 No anaerobic organisms isolated  Final   12/15/2023 No Growth  Final   12/15/2023 No Growth  Final   12/15/2023 No anaerobic organisms isolated  Final   12/15/2023 No Growth  Final   12/15/2023 No Growth  Final   12/08/2023 No anaerobic organisms isolated  Final   12/08/2023 No Growth  Final   12/08/2023 1+ Enterococcus faecium VRE (A)  Final     Comment:     Susceptibilities done on previous cultures   12/08/2023 No anaerobic organisms isolated  Final   12/08/2023 Isolated in broth only Enterococcus faecium VRE (A)  Final     Comment:     On day 3 of incubation  Not isolated or reported on routine aerobic culture  Susceptibilities done on previous cultures   12/08/2023 No Growth  Final   12/08/2023 No Growth  Final   12/08/2023 No anaerobic organisms isolated  Final   12/08/2023 No Growth  Final   12/08/2023 Isolated in broth only Enterococcus faecium VRE (A)  Corrected     Comment:     On day 2 of incubation  Susceptibilities done on previous cultures  Corrected result: Previously reported as Enterococcus avium (VRE) on 12/12/2023 at  7:29 AM CST.   12/08/2023 No anaerobic organisms isolated  Final   12/08/2023 No Growth  Final   12/08/2023 2+ Enterococcus faecium VRE (A)  Final   12/08/2023 2+ Staphylococcus epidermidis (A)  Final      Comment:     Susceptibilities not routinely done, refer to antibiogram to view typical susceptibility profiles   12/01/2023 No anaerobic organisms isolated  Final   12/01/2023 1+ Enterococcus faecium VRE (A)  Final     Comment:     Susceptibilities done on previous cultures   11/29/2023 1+ Enterococcus faecium VRE (A)  Final   11/29/2023 No Growth  Final   11/29/2023 No Growth  Final   10/25/2023 No Growth  Final   10/25/2023 No Growth  Final   10/17/2023 2+ Staphylococcus epidermidis (A)  Final     Comment:     Susceptibilities not routinely done, refer to antibiogram to view typical susceptibility profiles   10/17/2023 1+ Candida albicans (A)  Final     Comment:     Susceptibilities not routinely done, refer to antibiogram to view typical susceptibility profiles   10/14/2023 No Growth  Final   10/14/2023 No Growth  Final   10/09/2023 No Growth  Final   10/09/2023 No Growth  Final   10/01/2023 2+ Candida albicans (A)  Final     Comment:     Susceptibilities not routinely done, refer to antibiogram to view typical susceptibility profiles   10/01/2023 2+ Debbi krusei (A)  Final     Comment:     Susceptibilities not routinely done, refer to antibiogram to view typical susceptibility profiles   10/01/2023 1+ Normal hakan  Final   10/01/2023 No Growth  Final   10/01/2023 No Growth  Final   09/28/2023 3+ Candida albicans (A)  Final     Comment:     Susceptibilities not routinely done, refer to antibiogram to view typical susceptibility profiles   09/28/2023 1+ Aspergillus fumigatus complex (A)  Final   09/28/2023 3+ Normal hakan  Final     Urine Studies:    Recent Labs   Lab Test 10/01/23  1049 09/30/23  1029 09/30/23  0648   LEUKEST Negative Negative Negative   WBCU 7* 7* 12*     Hematology Studies:    Recent Labs   Lab Test 01/19/24  1030 01/10/24  1711 01/03/24  1430 12/29/23  1127 12/20/23  1200 12/18/23  0555 10/27/23  1255 10/27/23  0548 10/26/23  1051 10/26/23  0700 10/24/23  0756 10/24/23  0651  10/23/23  0719 10/23/23  0555 10/22/23  1309 10/22/23  0422 10/21/23  0354   WBC 7.0 8.6 8.5 7.4 10.7 5.8   < > 11.5*   < > 13.2*   < > 12.4*  --  12.6*  --  11.3* 13.9*   ANEU  --   --   --   --   --   --   --  8.1  --  9.4*  --  9.5*  --  9.3*  --  8.4* 9.3*   AEOS  --   --   --   --   --   --   --  3.0*  --  2.4*  --  1.9*  --  2.4*  --  2.0* 1.8*   HGB 9.5* 8.9* 9.6* 10.6* 9.1* 8.2*   < > 8.4*   < > 8.4*   < > 5.8*   < > 6.8*   < > 7.1* 8.0*   MCV 96 95 96 99 97 99   < > 94   < > 96   < > 95  --  96  --  94 90   * 112* 115* 138* 133* 96*   < > 124*   < > 128*   < > 142*  --  185  --  167 159    < > = values in this interval not displayed.      Metabolic Studies:   Recent Labs   Lab Test 01/19/24  1030 01/10/24  1711 01/03/24  1430 12/29/23  1127 12/20/23  1200    139 138 139 138   POTASSIUM 3.9 3.5 4.1 4.2 4.6   CHLORIDE 109* 108* 108* 107 108*   CO2 19* 19* 18* 21* 22   BUN 31.6* 33.4* 32.6* 28.9* 34.0*   CR 1.95* 2.04* 2.20* 2.02*  2.02* 1.79*   GFRESTIMATED 36* 34* 31* 35*  35* 40*     Hepatic Studies:   Recent Labs   Lab Test 01/10/24  1711 12/29/23  1127 12/08/23  0732 10/30/23  0651 10/29/23  0401 10/28/23  0525   BILITOTAL <0.2 0.2 0.3 0.5 0.4 0.5   ALKPHOS 75 79 71 132* 128 133*   ALBUMIN 3.5 3.9 3.5 3.1* 3.0* 3.1*   AST 17 19 18 24 25 34   ALT 16 21 16 15 19 25      Left femur proximal bone biopsy 12/15/2023:  -Small fragments of benign bone with no definite evidence of osteomyelitis.

## 2024-01-23 DIAGNOSIS — T87.9 AKA STUMP COMPLICATION (H): Primary | ICD-10-CM

## 2024-01-23 NOTE — PROGRESS NOTES
[facility]   at the [facility]   Otolaryngology Clinic     Patient: Alfredo Burnham    MRN: [id number]    : 1955    Age/Gender: 68 year old male  Date of Service: 2024  Rendering Provider:   Kay Cobb MD      Referring Provider   PCP: Yuki Wright  Referring Physician: No referring provider defined for this encounter.  Reason for Consultation   Dysphonia  S/p injection laryngoplasty with voice gel   History   HISTORY OF PRESENT ILLNESS: Mr. Burnham is a 68 year old male who presents to us today with dysphonia.      Of note he has a history of closure of abdominal fascia and subcutaneous tissue left open with wound VAC applied 10/2/23     he presents today for evaluation. he reports:    Dysphonia  - voice is back to 9 out of 10  - has been feeling fatigue lately due to other medical issues- affects the quality of the voice  - can call between rooms, not restricted socially  - have had nosebleeds that plugged for weeks from preious tubes placed i nthe nose at the hospital  - denies swallowing or eating issues  -  has been intubated multiple times, with one instance for AHRF. Extubated 10/20 following surgery  - has previously had a NG tube to help him eat      PAST MEDICAL HISTORY:   Past Medical History:   Diagnosis Date    Hypertension 2011    Macular degeneration        PAST SURGICAL HISTORY:   Past Surgical History:   Procedure Laterality Date    AMPUTATE LEG ABOVE KNEE Left 10/17/2023    Procedure: AMPUTATION, ABOVE KNEE and Abdominal wound vac exchange;  Surgeon: Ash Boucher MBBS;  Location: [facility]    AMPUTATE REVISION STUMP LOWER EXTREMITY Left 2023    Procedure: REVISION, AMPUTATION STUMP, LEFT LOWER EXTREMITY;  Surgeon: Boris Lowe;  [facility]: [facility]    AMPUTATE REVISION STUMP LOWER EXTREMITY Left 12/15/2023    Procedure: IRRIGATION AND DEBRIDEMENT OF LEFT AMPUTATION STUMP, WITH CEMENT ANTIBIOTIC BEAD INSERTION X3;   Surgeon: Boris Lowe;  Location: [facility]    CLOSE SECONDARY WOUND ABDOMEN N/A 10/20/2023    Procedure: Delayed primary subcutaneous abdominal closure;  Surgeon: Boris Lowe;  [facility]: [facility]    INCISION AND DRAINAGE ABDOMEN WASHOUT, COMBINED N/A 09/25/2023    Procedure: abdominal washout, combined, repacking,  abthera placement;  Surgeon: Ash Boucher MBBS;  [facility]    INCISION AND DRAINAGE ABDOMEN WASHOUT, COMBINED N/A 09/26/2023    Procedure: Abdominal washout, Retroperitoneal closure, washout left lower extremity wound;  Surgeon: Boris Lowe;  [facility]    INCISION AND DRAINAGE LOWER EXTREMITY, COMBINED Left 12/1/2023    Procedure: irrigation and drainage of collection left above knee amputation stump;  Surgeon: Ash Boucher MBBS;  [facility]    IR CVC NON TUNNEL LINE REMOVAL  10/18/2023    IR CVC TUNNEL PLACEMENT > 5 YRS OF AGE  10/18/2023    IR CVC TUNNEL REMOVAL RIGHT  11/02/2023    IR OR ANGIOGRAM  09/25/2023    IRRIGATION AND DEBRIDEMENT ABDOMEN WASHOUT, COMBINED N/A 09/29/2023    Procedure: exploration of  ABDOMINAL CAVITY, placement of abthera;  Surgeon: Boris Lowe;  [facility]    IRRIGATION AND DEBRIDEMENT ABDOMEN WASHOUT, COMBINED N/A 10/02/2023    Procedure: Exploratory laparotomy, abominal closure, wound vac change left lower extremity;  Surgeon: Jonathan Fry MD;  [facility]    IRRIGATION AND DEBRIDEMENT LOWER EXTREMITY, COMBINED Left 09/29/2023    Procedure: exploration of left lower extremity, partial closure of left lower extremity, wound vac placement;  Surgeon: Boris Lowe;  [facility]    LAPAROTOMY EXPLORATORY N/A 09/25/2023    Procedure: Laparotomy exploratory;  Surgeon: Roger Shirley MD;  Location: [facility]    PICC INSERTION - DOUBLE LUMEN Right 12/04/2023    43-1cm, Basilic vein    REPAIR ANEURYSM ABDOMINAL AORTA N/A 09/24/2023    Procedure: Resection of Ruptureed Abdominal Aneurysm, Aortic biliary  bypass with 20 x 10 mm Bifurcated hemagard graft, Temporary Abdominal Closure, Endovascular Balloon Inclusion of Aorta;  Surgeon: Boris Lowe;  [facility]    SIGMOIDOSCOPY FLEXIBLE N/A 09/25/2023    Procedure: Sigmoidoscopy flexible;  Surgeon: Gabino Walker MD;  [facility]    THROMBECTOMY LOWER EXTREMITY Left 09/25/2023    Procedure: SFA, popliteal, and tibial thromboembolectomy and four compartment fasciotomy;  Surgeon: Ash Boucher MBBS;  [facility]       CURRENT MEDICATIONS:   Current Outpatient Medications:     amLODIPine (NORVASC) 10 MG tablet, Take 1 tablet (10 mg) by mouth daily for 360 days Hold for SBP<105, Disp: 90 tablet, Rfl: 3    apixaban ANTICOAGULANT (ELIQUIS) 5 MG tablet, Take 1 tablet (5 mg) by mouth 2 times daily for 90 days, Disp: 180 tablet, Rfl: 0    atorvastatin (LIPITOR) 10 MG tablet, Take 1 tablet (10 mg) by mouth every evening, Disp: , Rfl:     DAPTOmycin 700 mg, Inject 700 mg into the vein every 48 hours for 37 days, Disp: , Rfl:     docusate sodium (COLACE) 50 MG capsule, Take 50 mg by mouth 2 times daily, Disp: , Rfl:     melatonin 3 MG tablet, Take 1 tablet (3 mg) by mouth every evening, Disp: , Rfl:     metoprolol tartrate (LOPRESSOR) 100 MG tablet, Take 1 tablet (100 mg) by mouth 2 times daily for 360 days Hold for SBP<100, Disp: 180 tablet, Rfl: 3    pantoprazole (PROTONIX) 40 MG EC tablet, Take 1 tablet (40 mg) by mouth every 24 hours for 360 days, Disp: 90 tablet, Rfl: 3    polyethylene glycol (MIRALAX) 17 g packet, Take 1 packet by mouth daily (Patient not taking: Reported on 1/15/2024), Disp: , Rfl:     psyllium (METAMUCIL) 28.3 % packet, Take 1 packet by mouth daily (Patient not taking: Reported on 1/15/2024), Disp: , Rfl:     QUEtiapine (SEROQUEL) 25 MG tablet, Take 3 tablets (75 mg) by mouth at bedtime for 360 days (Patient not taking: Reported on 1/15/2024), Disp: 270 tablet, Rfl: 3    QUEtiapine (SEROQUEL) 25 MG tablet, Take 1 tablet (25  mg) by mouth 2 times daily as needed For anxiety (Patient not taking: Reported on 1/15/2024), Disp: 180 tablet, Rfl: 3    sennosides (SENOKOT) 8.6 MG tablet, Take 1 tablet by mouth 2 times daily as needed for constipation Only take if constipated (Patient not taking: Reported on 1/15/2024), Disp: 40 tablet, Rfl: 3    UNABLE TO FIND, MEDICATION NAME: [facility] - [facility] and spouse unsure of name, Disp: , Rfl:     Vitamin D3 (CHOLECALCIFEROL) 25 mcg (1000 units) tablet, Take 2 tablets (50 mcg) by mouth every 24 hours for 360 days, Disp: 180 tablet, Rfl: 3    ALLERGIES: Penicillins and Cefepime    SOCIAL HISTORY:    Social History     Socioeconomic History    Marital status:      Spouse name: Not on file    Number of children: Not on file    Years of education: Not on file    Highest education level: Not on file   Occupational History    Not on file   Tobacco Use    Smoking status: Former     Packs/day: .5     Types: Cigarettes    Smokeless tobacco: Not on file    Tobacco comments:     Last cigarette/ETOH September 2023   Substance and Sexual Activity    Alcohol use: Not Currently     Comment: 1-2/wk    Drug use: No    Sexual activity: Yes     Partners: Female   Other Topics Concern    Parent/sibling w/ CABG, MI or angioplasty before 65F 55M? Yes   Social History Narrative    Not on file     Social Determinants of Health     Financial Resource Strain: Not on file   Food Insecurity: Not on file   Transportation Needs: Not on file   Physical Activity: Not on file   Stress: Not on file   Social Connections: Not on file   Interpersonal Safety: Not on file   Housing Stability: Not on file         FAMILY HISTORY:   Family History   Problem Relation Age of Onset    Unknown/Adopted Mother     Heart Disease Mother     Arthritis Mother     Hypertension Brother     Blood Disease Sister     Psychotic Disorder Sister      Non-contributory for problems with anesthesia    REVIEW OF SYSTEMS:   The patient  was asked a 14 point review of systems regarding constitutional symptoms, eye symptoms, ears, nose, mouth, throat symptoms, cardiovascular symptoms, respiratory symptoms, gastrointestinal symptoms, genitourinary symptoms, musculoskeletal symptoms, integumentary symptoms, neurological symptoms, psychiatric symptoms, endocrine symptoms, hematologic/lymphatic symptoms, and allergic/ immunologic symptoms.   The pertinent factors have been included in the HPI and below.  Patient Supplied Answers to Review of Systems       No data to display                Physical Examination   The patient underwent a physical examination as described below. The pertinent positive and negative findings are summarized after the description of the examination.  Constitutional: The patient's developmental and nutritional status was assessed. The patient's voice quality was assessed.  Head and Face: The head and face were inspected for deformities. The sinuses were palpated. The salivary glands were palpated. Facial muscle strength was assessed bilaterally.  Eyes: Extraocular movements and primary gaze alignment were assessed.  Ears, Nose, Mouth and Throat: The ears and nose were examined for deformities. The nasal septum, mucosa, and turbinates were inspected by anterior rhinoscopy. The lips, teeth, and gums were examined for abnormalities. The oral mucosa, tongue, palate, tonsils, lateral and posterior pharynx were inspected for the presence of asymmetry or mucosal lesions.    Neck: The tracheal position was noted, and the neck mass palpated to determine if there were any asymmetries, abnormal neck masses, thyromegally, or thyroid nodules.  Respiratory: The nature of the breathing and chest expansion/symmetry was observed.  Cardiovascular: The patient was examined to determine the presence of any edema or jugular venous distension.  Abdomen: The contour of the abdomen was noted.  Lymphatic: The patient was examined for infraclavicular  lymphadenopathy.  Musculoskeletal: The patient was inspected for the presence of skeletal deformities.  Extremities: The extremities were examined for any clubbing or cyanosis.  Skin: The skin was examined for inflammatory or neoplastic conditions.  Neurologic: The patient's orientation, mood, and affect were noted. The cranial nerve  functions were examined.  Other pertinent positive and negative findings on physical examination:      OC/OP: no lesions, uvula midline, soft palate elevates symmetrically   Neck: no lesions, no TH tenderness to palpation  All other physical examination findings were within normal limits and noncontributory.    Review of Relevant Clinical Data   I personally reviewed:  Notes: Dr. Marquita Russ, SLP, 11/30/23  Therapy recommendation(s):    Continued therapy is recommended.  Rationale/Recommendations:  Pt presents with improved but ongoing mild dysphagia. VFSS repeated 11/28, see below for details. Pt recently advanced to regular texture, thin (0) liquid just prior to discharge from rehab. Would benefit from ongoing SLP to follow up with diet and ensure safety. Pt also presents with at least mild-moderate cognitive impairments with deficits re: attention, short term and working memory, and executive function. Information measured informally via various verbal subtests as visual impairments limit ability for formal evaluation. ENT completed L VC injection during prior acute hospitalization. Ongoing dysphonia, pt reported no further interest in future injection. ENT planning to follow at OP. Recommend ongoing SLP for cognitive impairments and dysphagia.     Dr. Kay Cobb, 11/1  DESCRIPTION OF PROCEDURE:    Anatomic/physiological deviations: RNC, 1.5cc of voice gel via transthyrohyoid approach with good medialization        Radiology: XR Xray Video Swallow Exam 11/28/23  IMPRESSION:   1. No evidence of tracheal aspiration.  2. Infrequent flash penetration with thin barium.      HEAD CT  "10/25/23  IMPRESSION:  1.  No acute intracranial abnormality.     2.  No hemorrhage or mass effect     3.  Scattered infarcts seen on the previous MRI are much better appreciated on that study. No definite new infarct by CT.     HEAD/NECK CTA 10/25/23  IMPRESSION:  1.  No high-grade stenosis, branch occlusion, or aneurysm.   2. No high-grade stenosis or dissection.    Labs:  Lab Results   Component Value Date    TSH 16.70 (H) 10/25/2023     Lab Results   Component Value Date     01/19/2024    CO2 19 (L) 01/19/2024    BUN 31.6 (H) 01/19/2024    PHOS 4.7 (H) 12/18/2023     Lab Results   Component Value Date    WBC 7.0 01/19/2024    HGB 9.5 (L) 01/19/2024    HCT 29.5 (L) 01/19/2024    MCV 96 01/19/2024     (L) 01/19/2024     Lab Results   Component Value Date    PT 9.7 10/15/2009    PTT 80 (H) 12/12/2023    INR 1.27 (H) 12/06/2023     No results found for: \"CJ\"  No components found for: \"RHEUMATOIDFACTOR\", \"RF\"  No results found for: \"CRP\"  No components found for: \"CKTOT\", \"URICACID\"  No components found for: \"C3\", \"C4\", \"DSDNAAB\", \"NDNAABIFA\"  No results found for: \"MPOAB\"    Patient reported Quality of Life (QOL) Measures   Patient Supplied Answers To VHI Questionnaire       No data to display                  Patient Supplied Answers To EAT Questionnaire       No data to display                  Patient Supplied Answers To CSI Questionnaire       No data to display                  Patient Supplied Answers to Dyspnea Index Questionnaire:       No data to display                Impression & Plan     IMPRESSION: Mr. Burnham is a 68 year old male who is being seen for the following:    Dysphonia  - ongoing since 10/2023  -  improved   - in the setting of multiple surgeries  - voice is now back to normal, does not bother him, he is home from the hospital  - in the hospital, he had evidence of left vocal fold paralysis  - s/p injection laryngoplasty with voice gel 11/1  - does not want to be scoped to " day, he is adamant about this  - discussed reasons for scoping to establish anatomy,   - discussed if he doesn't want to do this today, he can reach out in November 2024  Plan  - observation      RETURN VISIT: as needed    Thank you for the kind referral and for allowing me to share in the care of Mr. Burnham. If you have any questions, please do not hesitate to contact me.    Scribe Disclosure:   I, Belén Knox, am serving as a scribe; to document services personally performed by Kay Cobb MD -based on data collection and the provider's statements to me.     Provider Disclosure:  I agree with above History, Review of Systems, Physical exam and Plan.  I have reviewed the content of the documentation and have edited it as needed. I have personally performed the services documented here and the documentation accurately represents those services and the decisions I have made.        Kay Cobb MD    Laryngology    [facility]  Department of  Otolaryngology - Head and Neck Surgery  [facility]  [address] [place] [address]  Appointment line: [phone]  Fax: [phone]  [email or web address]

## 2024-01-24 ENCOUNTER — LAB REQUISITION (OUTPATIENT)
Dept: LAB | Facility: CLINIC | Age: 69
End: 2024-01-24
Payer: COMMERCIAL

## 2024-01-24 ENCOUNTER — TELEPHONE (OUTPATIENT)
Dept: VASCULAR SURGERY | Facility: CLINIC | Age: 69
End: 2024-01-24
Payer: COMMERCIAL

## 2024-01-24 DIAGNOSIS — T81.42XA INFECTION FOLLOWING A PROCEDURE, DEEP INCISIONAL SURGICAL SITE, INITIAL ENCOUNTER: ICD-10-CM

## 2024-01-24 LAB
ANION GAP SERPL CALCULATED.3IONS-SCNC: 12 MMOL/L (ref 7–15)
BASOPHILS # BLD AUTO: 0.1 10E3/UL (ref 0–0.2)
BASOPHILS NFR BLD AUTO: 1 %
BUN SERPL-MCNC: 36 MG/DL (ref 8–23)
CALCIUM SERPL-MCNC: 9.6 MG/DL (ref 8.8–10.2)
CHLORIDE SERPL-SCNC: 109 MMOL/L (ref 98–107)
CK SERPL-CCNC: 20 U/L (ref 39–308)
CREAT SERPL-MCNC: 2.06 MG/DL (ref 0.67–1.17)
CRP SERPL-MCNC: 3.74 MG/L
DEPRECATED HCO3 PLAS-SCNC: 18 MMOL/L (ref 22–29)
EGFRCR SERPLBLD CKD-EPI 2021: 34 ML/MIN/1.73M2
EOSINOPHIL # BLD AUTO: 1.3 10E3/UL (ref 0–0.7)
EOSINOPHIL NFR BLD AUTO: 21 %
ERYTHROCYTE [DISTWIDTH] IN BLOOD BY AUTOMATED COUNT: 14.6 % (ref 10–15)
GLUCOSE SERPL-MCNC: 122 MG/DL (ref 70–99)
HCT VFR BLD AUTO: 28.6 % (ref 40–53)
HGB BLD-MCNC: 9.4 G/DL (ref 13.3–17.7)
HOLD SPECIMEN: NORMAL
HOLD SPECIMEN: NORMAL
IMM GRANULOCYTES # BLD: 0.1 10E3/UL
IMM GRANULOCYTES NFR BLD: 1 %
LYMPHOCYTES # BLD AUTO: 1.1 10E3/UL (ref 0.8–5.3)
LYMPHOCYTES NFR BLD AUTO: 18 %
MCH RBC QN AUTO: 31.6 PG (ref 26.5–33)
MCHC RBC AUTO-ENTMCNC: 32.9 G/DL (ref 31.5–36.5)
MCV RBC AUTO: 96 FL (ref 78–100)
MONOCYTES # BLD AUTO: 0.6 10E3/UL (ref 0–1.3)
MONOCYTES NFR BLD AUTO: 10 %
NEUTROPHILS # BLD AUTO: 3.1 10E3/UL (ref 1.6–8.3)
NEUTROPHILS NFR BLD AUTO: 49 %
NRBC # BLD AUTO: 0 10E3/UL
NRBC BLD AUTO-RTO: 0 /100
PLATELET # BLD AUTO: 111 10E3/UL (ref 150–450)
POTASSIUM SERPL-SCNC: 3.9 MMOL/L (ref 3.4–5.3)
RBC # BLD AUTO: 2.97 10E6/UL (ref 4.4–5.9)
SODIUM SERPL-SCNC: 139 MMOL/L (ref 135–145)
WBC # BLD AUTO: 6.2 10E3/UL (ref 4–11)

## 2024-01-24 PROCEDURE — 85025 COMPLETE CBC W/AUTO DIFF WBC: CPT | Performed by: INTERNAL MEDICINE

## 2024-01-24 PROCEDURE — 82550 ASSAY OF CK (CPK): CPT | Performed by: INTERNAL MEDICINE

## 2024-01-24 PROCEDURE — 86140 C-REACTIVE PROTEIN: CPT | Performed by: INTERNAL MEDICINE

## 2024-01-24 PROCEDURE — 80048 BASIC METABOLIC PNL TOTAL CA: CPT | Performed by: INTERNAL MEDICINE

## 2024-01-24 NOTE — TELEPHONE ENCOUNTER
Attempted to call Cheri, home health nurse. She is out of office. Contacted [facility] main office and spoke with manager. Requested update and possible photo of wound. She noted Pt was seen today by . They will have  contact clinic. Clinic backline provided.     Tiffanie Paula LPN

## 2024-01-24 NOTE — TELEPHONE ENCOUNTER
"Received return call from Jenni MCLAIN, home care nurse with [employer]. She noted Pt was seen today. She felt wound was looking well. Area where antibiotic bead is visible on medial aspect is \"beefy red\". The other end of the wound of noted to be closing and no longer able to be packed. Jenni noted because of that, the fluid is pushing over to opening and is more wet that is causing a \"slimy residue\". She doesn't feel that it is slough and there is no odor. She also noted that ID has opted to continue daptomycin.    She will attempt to take photo on Friday during visit and email to writer. VS NP updated.    Tiffanie Paula LPN    "

## 2024-01-24 NOTE — TELEPHONE ENCOUNTER
Brief Vascular Surgery Note:    Spoke with Nelson and his wife Tabby, they will stop taking Apixaban on Tuesday January 30th as surgery is planned for Friday, February 2nd, first case in the morning.     Miryam Carrasco CNP  Vascular Surgery  Pager: [phone]  [email or web address]  Send message or 10 digit call back number Securely via [employer] with the [employer] Web Console (learn more here)

## 2024-01-24 NOTE — PROGRESS NOTES
"[facility]  Evaluation report    Clinician: Jone Boyd M.M., M.A., CCC/SLP  Seen in conjunction with: Dr. Cobb  Patient: Alfredo Burnham  Date of Visit: 1/25/2024    HISTORY  Chief complaint: Alfredo Burnham is a 68 year old gentleman presenting today for evaluation of voice.    Chart Review: Patient has a history of a left vocal cord paresis status post injection augmentation on 11/2/2023.  Onset of left vocal fold paresis occurred within a very complex hospitalization that began in late September 2023 for abdominal pain that was determined to be a \"ruptured AAA\" necessitating open repair and numerous complications following that procedure.  Please see discharge from 11/17/2023 for complete details.  He was last seen at bedside in mid December, and at that time voice was reported to be slightly improved by the patient's spouse, though still weak.  He was tolerating normal diet, so no immediate action was recommended at that time.  He presents today for planned outpatient follow-up.    Today he and his wife note that he still feels weak on antibiotics, and his general fatigue is still relatively high.  Overall though things have improved greatly with regards to voice, breathing, swallowing, cough and other ENT concerns    CURRENT SYMPTOMS PER PATIENT REPORT:  VOICE  Onset and inciting incident:   Progression: improving across time to near normal  If he's not tired and has water with him voice is a 9/10  Reduced loudness  Describes voice as \"raspy\"  Can communicate freely  Vocal demand:  Social voice use    SWALLOWING  Pills are a bit harder because of the non-coated nature  Mealtime is fine  Putting some weight back on  No choking during mealtime or coughing    Ultimately patient declined laryngeal exam today.  He was encouraged to follow-up in November 2024 approximately 1 year after confirmed vocal fold mobility limitation to evaluate long-term function.  No skilled services were provided as a part " of today's interaction and there is correspondingly no charge.      Jone Boyd M.M., M.A., CCC-SLP  Speech-Language Pathologist  Certificate of Vocology  [phone]    *this report was created in part through the use of computerized dictation software, and though reviewed following completion, some typographic errors may persist.  If there is confusion regarding any of this notes contents, please contact me for clarification.*

## 2024-01-25 ENCOUNTER — OFFICE VISIT (OUTPATIENT)
Dept: OTOLARYNGOLOGY | Facility: CLINIC | Age: 69
End: 2024-01-25
Payer: COMMERCIAL

## 2024-01-25 VITALS
BODY MASS INDEX: 20.4 KG/M2 | WEIGHT: 130 LBS | HEART RATE: 61 BPM | DIASTOLIC BLOOD PRESSURE: 76 MMHG | HEIGHT: 67 IN | SYSTOLIC BLOOD PRESSURE: 126 MMHG

## 2024-01-25 DIAGNOSIS — R49.0 DYSPHONIA: Primary | ICD-10-CM

## 2024-01-25 DIAGNOSIS — J38.01 UNILATERAL VOCAL FOLD PARESIS: ICD-10-CM

## 2024-01-25 PROCEDURE — 99214 OFFICE O/P EST MOD 30 MIN: CPT | Performed by: OTOLARYNGOLOGY

## 2024-01-25 PROCEDURE — 99207 PR NO CHARGE LOS: CPT

## 2024-01-25 RX ORDER — AMLODIPINE AND BENAZEPRIL HYDROCHLORIDE 5; 20 MG/1; MG/1
1 CAPSULE ORAL DAILY
Status: ON HOLD | COMMUNITY
Start: 2023-02-03 | End: 2024-02-05

## 2024-01-25 NOTE — LETTER
2024       RE: Alfredo Burnham  [address]  [place] [address]     Dear Colleague,    Thank you for referring your patient, Alfredo Burnham, to the [facility] NO[facility] [place] [facility]. Please see a copy of my visit note below.        [facility]   at the [facility]   Otolaryngology Clinic     Patient: Alfredo Burnham    MRN: [id number]    : 1955    Age/Gender: 68 year old male  Date of Service: 2024  Rendering Provider:   Kay Cobb MD      Referring Provider   PCP: [facility]  Referring Physician: No referring provider defined for this encounter.  Reason for Consultation   Dysphonia  S/p injection laryngoplasty with voice gel   History   HISTORY OF PRESENT ILLNESS: Mr. Burnham is a 68 year old male who presents to us today with dysphonia.      Of note he has a history of closure of abdominal fascia and subcutaneous tissue left open with wound VAC applied 10/2/23     he presents today for evaluation. he reports:    Dysphonia  - voice is back to 9 out of 10  - has been feeling fatigue lately due to other medical issues- affects the quality of the voice  - can call between rooms, not restricted socially  - have had nosebleeds that plugged for weeks from preious tubes placed i nthe nose at the hospital  - denies swallowing or eating issues  -  has been intubated multiple times, with one instance for AHRF. Extubated 10/20 following surgery  - has previously had a NG tube to help him eat      PAST MEDICAL HISTORY:   Past Medical History:   Diagnosis Date     Hypertension 2011     Macular degeneration        PAST SURGICAL HISTORY:   Past Surgical History:   Procedure Laterality Date     AMPUTATE LEG ABOVE KNEE Left 10/17/2023    Procedure: AMPUTATION, ABOVE KNEE and Abdominal wound vac exchange;  Surgeon: Ash Boucher MBBS;  Location:   OR     AMPUTATE REVISION STUMP LOWER EXTREMITY Left 12/8/2023    Procedure: REVISION, AMPUTATION STUMP, LEFT LOWER EXTREMITY;  Surgeon: Boris Lowe;  [facility]     AMPUTATE REVISION STUMP LOWER EXTREMITY Left 12/15/2023    Procedure: IRRIGATION AND DEBRIDEMENT OF LEFT AMPUTATION STUMP, WITH CEMENT ANTIBIOTIC BEAD INSERTION X3;  Surgeon: Boris Lowe;  [facility] OR     CLOSE SECONDARY WOUND ABDOMEN N/A 10/20/2023    Procedure: Delayed primary subcutaneous abdominal closure;  Surgeon: Boris Lowe;  [facility]     INCISION AND DRAINAGE ABDOMEN WASHOUT, COMBINED N/A 09/25/2023    Procedure: abdominal washout, combined, repacking,  abthera placement;  Surgeon: Ash Boucher MBBS;  [facility]     INCISION AND DRAINAGE ABDOMEN WASHOUT, COMBINED N/A 09/26/2023    Procedure: Abdominal washout, Retroperitoneal closure, washout left lower extremity wound;  Surgeon: Boris Lowe;  [facility]     INCISION AND DRAINAGE LOWER EXTREMITY, COMBINED Left 12/1/2023    Procedure: irrigation and drainage of collection left above knee amputation stump;  Surgeon: Ash Boucher MBBS;  [facility]     IR CVC NON TUNNEL LINE REMOVAL  10/18/2023     IR CVC TUNNEL PLACEMENT > 5 YRS OF AGE  10/18/2023     IR CVC TUNNEL REMOVAL RIGHT  11/02/2023     IR OR ANGIOGRAM  09/25/2023     IRRIGATION AND DEBRIDEMENT ABDOMEN WASHOUT, COMBINED N/A 09/29/2023    Procedure: exploration of  ABDOMINAL CAVITY, placement of abthera;  Surgeon: Boris Lowe;  [facility]     IRRIGATION AND DEBRIDEMENT ABDOMEN WASHOUT, COMBINED N/A 10/02/2023    Procedure: Exploratory laparotomy, abominal closure, wound vac change left lower extremity;  Surgeon: Jonathan Fry MD;  [facility]     IRRIGATION AND DEBRIDEMENT LOWER EXTREMITY, COMBINED Left 09/29/2023    Procedure: exploration of left lower extremity, partial closure of left lower extremity, wound vac placement;  Surgeon:  Boris Lowe;  [facility]     LAPAROTOMY EXPLORATORY N/A 09/25/2023    Procedure: Laparotomy exploratory;  Surgeon: Roger Shirley MD;  [facility]     PICC INSERTION - DOUBLE LUMEN Right 12/04/2023    43-1cm, Basilic vein     REPAIR ANEURYSM ABDOMINAL AORTA N/A 09/24/2023    Procedure: Resection of Ruptureed Abdominal Aneurysm, Aortic biliary bypass with 20 x 10 mm Bifurcated hemagard graft, Temporary Abdominal Closure, Endovascular Balloon Inclusion of Aorta;  Surgeon: Brois Lowe;  [facility]     SIGMOIDOSCOPY FLEXIBLE N/A 09/25/2023    Procedure: Sigmoidoscopy flexible;  Surgeon: Gabino Walker MD;  [facility]     THROMBECTOMY LOWER EXTREMITY Left 09/25/2023    Procedure: SFA, popliteal, and tibial thromboembolectomy and four compartment fasciotomy;  Surgeon: Ash Boucher MBBS;  [facility]       CURRENT MEDICATIONS:   Current Outpatient Medications:      amLODIPine (NORVASC) 10 MG tablet, Take 1 tablet (10 mg) by mouth daily for 360 days Hold for SBP<105, Disp: 90 tablet, Rfl: 3     apixaban ANTICOAGULANT (ELIQUIS) 5 MG tablet, Take 1 tablet (5 mg) by mouth 2 times daily for 90 days, Disp: 180 tablet, Rfl: 0     atorvastatin (LIPITOR) 10 MG tablet, Take 1 tablet (10 mg) by mouth every evening, Disp: , Rfl:      DAPTOmycin 700 mg, Inject 700 mg into the vein every 48 hours for 37 days, Disp: , Rfl:      docusate sodium (COLACE) 50 MG capsule, Take 50 mg by mouth 2 times daily, Disp: , Rfl:      melatonin 3 MG tablet, Take 1 tablet (3 mg) by mouth every evening, Disp: , Rfl:      metoprolol tartrate (LOPRESSOR) 100 MG tablet, Take 1 tablet (100 mg) by mouth 2 times daily for 360 days Hold for SBP<100, Disp: 180 tablet, Rfl: 3     pantoprazole (PROTONIX) 40 MG EC tablet, Take 1 tablet (40 mg) by mouth every 24 hours for 360 days, Disp: 90 tablet, Rfl: 3     polyethylene glycol (MIRALAX) 17 g packet, Take 1 packet by mouth daily (Patient not taking: Reported  on 1/15/2024), Disp: , Rfl:      psyllium (METAMUCIL) 28.3 % packet, Take 1 packet by mouth daily (Patient not taking: Reported on 1/15/2024), Disp: , Rfl:      QUEtiapine (SEROQUEL) 25 MG tablet, Take 3 tablets (75 mg) by mouth at bedtime for 360 days (Patient not taking: Reported on 1/15/2024), Disp: 270 tablet, Rfl: 3     QUEtiapine (SEROQUEL) 25 MG tablet, Take 1 tablet (25 mg) by mouth 2 times daily as needed For anxiety (Patient not taking: Reported on 1/15/2024), Disp: 180 tablet, Rfl: 3     sennosides (SENOKOT) 8.6 MG tablet, Take 1 tablet by mouth 2 times daily as needed for constipation Only take if constipated (Patient not taking: Reported on 1/15/2024), Disp: 40 tablet, Rfl: 3     UNABLE TO FIND, MEDICATION NAME: [facility] - [facility] and spouse unsure of name, Disp: , Rfl:      Vitamin D3 (CHOLECALCIFEROL) 25 mcg (1000 units) tablet, Take 2 tablets (50 mcg) by mouth every 24 hours for 360 days, Disp: 180 tablet, Rfl: 3    ALLERGIES: Penicillins and Cefepime    SOCIAL HISTORY:    Social History     Socioeconomic History     Marital status:      Spouse name: Not on file     Number of children: Not on file     Years of education: Not on file     Highest education level: Not on file   Occupational History     Not on file   Tobacco Use     Smoking status: Former     Packs/day: .5     Types: Cigarettes     Smokeless tobacco: Not on file     Tobacco comments:     Last cigarette/ETOH September 2023   Substance and Sexual Activity     Alcohol use: Not Currently     Comment: 1-2/wk     Drug use: No     Sexual activity: Yes     Partners: Female   Other Topics Concern     Parent/sibling w/ CABG, MI or angioplasty before 65F 55M? Yes   Social History Narrative     Not on file     Social Determinants of Health     Financial Resource Strain: Not on file   Food Insecurity: Not on file   Transportation Needs: Not on file   Physical Activity: Not on file   Stress: Not on file   Social  Connections: Not on file   Interpersonal Safety: Not on file   Housing Stability: Not on file         FAMILY HISTORY:   Family History   Problem Relation Age of Onset     Unknown/Adopted Mother      Heart Disease Mother      Arthritis Mother      Hypertension Brother      Blood Disease Sister      Psychotic Disorder Sister      Non-contributory for problems with anesthesia    REVIEW OF SYSTEMS:   The patient was asked a 14 point review of systems regarding constitutional symptoms, eye symptoms, ears, nose, mouth, throat symptoms, cardiovascular symptoms, respiratory symptoms, gastrointestinal symptoms, genitourinary symptoms, musculoskeletal symptoms, integumentary symptoms, neurological symptoms, psychiatric symptoms, endocrine symptoms, hematologic/lymphatic symptoms, and allergic/ immunologic symptoms.   The pertinent factors have been included in the HPI and below.  Patient Supplied Answers to Review of Systems       No data to display                Physical Examination   The patient underwent a physical examination as described below. The pertinent positive and negative findings are summarized after the description of the examination.  Constitutional: The patient's developmental and nutritional status was assessed. The patient's voice quality was assessed.  Head and Face: The head and face were inspected for deformities. The sinuses were palpated. The salivary glands were palpated. Facial muscle strength was assessed bilaterally.  Eyes: Extraocular movements and primary gaze alignment were assessed.  Ears, Nose, Mouth and Throat: The ears and nose were examined for deformities. The nasal septum, mucosa, and turbinates were inspected by anterior rhinoscopy. The lips, teeth, and gums were examined for abnormalities. The oral mucosa, tongue, palate, tonsils, lateral and posterior pharynx were inspected for the presence of asymmetry or mucosal lesions.    Neck: The tracheal position was noted, and the neck mass  palpated to determine if there were any asymmetries, abnormal neck masses, thyromegally, or thyroid nodules.  Respiratory: The nature of the breathing and chest expansion/symmetry was observed.  Cardiovascular: The patient was examined to determine the presence of any edema or jugular venous distension.  Abdomen: The contour of the abdomen was noted.  Lymphatic: The patient was examined for infraclavicular lymphadenopathy.  Musculoskeletal: The patient was inspected for the presence of skeletal deformities.  Extremities: The extremities were examined for any clubbing or cyanosis.  Skin: The skin was examined for inflammatory or neoplastic conditions.  Neurologic: The patient's orientation, mood, and affect were noted. The cranial nerve  functions were examined.  Other pertinent positive and negative findings on physical examination:      OC/OP: no lesions, uvula midline, soft palate elevates symmetrically   Neck: no lesions, no TH tenderness to palpation  All other physical examination findings were within normal limits and noncontributory.    Review of Relevant Clinical Data   I personally reviewed:  Notes: Dr. Marquita Russ, SLP, 11/30/23  Therapy recommendation(s):    Continued therapy is recommended.  Rationale/Recommendations:  Pt presents with improved but ongoing mild dysphagia. VFSS repeated 11/28, see below for details. Pt recently advanced to regular texture, thin (0) liquid just prior to discharge from rehab. Would benefit from ongoing SLP to follow up with diet and ensure safety. Pt also presents with at least mild-moderate cognitive impairments with deficits re: attention, short term and working memory, and executive function. Information measured informally via various verbal subtests as visual impairments limit ability for formal evaluation. ENT completed L VC injection during prior acute hospitalization. Ongoing dysphonia, pt reported no further interest in future injection. ENT planning to follow  "at OP. Recommend ongoing SLP for cognitive impairments and dysphagia.     Dr. Kay Cobb, 11/1  DESCRIPTION OF PROCEDURE:    Anatomic/physiological deviations: RNC, 1.5cc of voice gel via transthyrohyoid approach with good medialization        Radiology: XR Xray Video Swallow Exam 11/28/23  IMPRESSION:   1. No evidence of tracheal aspiration.  2. Infrequent flash penetration with thin barium.      HEAD CT 10/25/23  IMPRESSION:  1.  No acute intracranial abnormality.     2.  No hemorrhage or mass effect     3.  Scattered infarcts seen on the previous MRI are much better appreciated on that study. No definite new infarct by CT.     HEAD/NECK CTA 10/25/23  IMPRESSION:  1.  No high-grade stenosis, branch occlusion, or aneurysm.   2. No high-grade stenosis or dissection.    Labs:  Lab Results   Component Value Date    TSH 16.70 (H) 10/25/2023     Lab Results   Component Value Date     01/19/2024    CO2 19 (L) 01/19/2024    BUN 31.6 (H) 01/19/2024    PHOS 4.7 (H) 12/18/2023     Lab Results   Component Value Date    WBC 7.0 01/19/2024    HGB 9.5 (L) 01/19/2024    HCT 29.5 (L) 01/19/2024    MCV 96 01/19/2024     (L) 01/19/2024     Lab Results   Component Value Date    PT 9.7 10/15/2009    PTT 80 (H) 12/12/2023    INR 1.27 (H) 12/06/2023     No results found for: \"CJ\"  No components found for: \"RHEUMATOIDFACTOR\", \"RF\"  No results found for: \"CRP\"  No components found for: \"CKTOT\", \"URICACID\"  No components found for: \"C3\", \"C4\", \"DSDNAAB\", \"NDNAABIFA\"  No results found for: \"MPOAB\"    Patient reported Quality of Life (QOL) Measures   Patient Supplied Answers To VHI Questionnaire       No data to display                  Patient Supplied Answers To EAT Questionnaire       No data to display                  Patient Supplied Answers To CSI Questionnaire       No data to display                  Patient Supplied Answers to Dyspnea Index Questionnaire:       No data to display                Impression & Plan "     IMPRESSION: Mr. Burnham is a 68 year old male who is being seen for the following:    Dysphonia  - ongoing since 10/2023  -  improved   - in the setting of multiple surgeries  - voice is now back to normal, does not bother him, he is home from the hospital  - in the hospital, he had evidence of left vocal fold paralysis  - s/p injection laryngoplasty with voice gel 11/1  - does not want to be scoped to day, he is adamant about this  - discussed reasons for scoping to establish anatomy,   - discussed if he doesn't want to do this today, he can reach out in November 2024  Plan  - observation      RETURN VISIT: as needed    Thank you for the kind referral and for allowing me to share in the care of Mr. Burnham. If you have any questions, please do not hesitate to contact me.    Scribe Disclosure:   I, Belén Knox, am serving as a scribe; to document services personally performed by Kay Cobb MD -based on data collection and the provider's statements to me.     Provider Disclosure:  I agree with above History, Review of Systems, Physical exam and Plan.  I have reviewed the content of the documentation and have edited it as needed. I have personally performed the services documented here and the documentation accurately represents those services and the decisions I have made.        Kay Cobb MD    Laryngology    [facility]  Department of  Otolaryngology - Head and Neck Surgery  Clinics & Surgery Center  [address] [place] [id number]  Appointment line: [phone]  Fax: [phone]  [email or web address]

## 2024-01-29 ENCOUNTER — TELEPHONE (OUTPATIENT)
Dept: INFECTIOUS DISEASES | Facility: CLINIC | Age: 69
End: 2024-01-29

## 2024-01-29 NOTE — TELEPHONE ENCOUNTER
"Received the below photos from homecare nurse Jenni LANDIN RN with the following note:    \"These are photos from AVA Burnham's Friday 1/26/24 visit.  The one with the dressing was attempting to show the slimy film stuff I was telling you about on the phone.  It doesn't smell and there's really not much of it at all.  It cleans right away and hasn't appeared to cause any additional skin breakdown. I'll see him again tomorrow, 1/29/24 and send another picture update if anything is enormously different.   I'm not able to get much more than like a golf ball sized piece of foam in there anymore and definitely less drainage overall.\"            Routed to VS NP and MD for review.     Tiffanie Paula LPN    "

## 2024-01-29 NOTE — TELEPHONE ENCOUNTER
[facility] was planning to send enough doses for surgery up to  2/2, Pt has surgery at 7:30am so wife does not want to pay for additional dose on 2/2.   Pt doses Q48 Daptomycin and is self pay.     Pt has one more dose left for 1/31/24 (q48 hours). The surgery is at 7:30am and she is refusing to carry at least one more dose on that day. Are we okay not sending extra dose?

## 2024-01-30 ENCOUNTER — ANESTHESIA EVENT (OUTPATIENT)
Dept: SURGERY | Facility: CLINIC | Age: 69
DRG: 465 | End: 2024-01-30
Payer: COMMERCIAL

## 2024-01-30 ENCOUNTER — TELEPHONE (OUTPATIENT)
Dept: VASCULAR SURGERY | Facility: CLINIC | Age: 69
End: 2024-01-30
Payer: COMMERCIAL

## 2024-01-31 ENCOUNTER — LAB REQUISITION (OUTPATIENT)
Dept: LAB | Facility: CLINIC | Age: 69
End: 2024-01-31
Payer: COMMERCIAL

## 2024-01-31 DIAGNOSIS — T81.42XA INFECTION FOLLOWING A PROCEDURE, DEEP INCISIONAL SURGICAL SITE, INITIAL ENCOUNTER: ICD-10-CM

## 2024-01-31 LAB
ANION GAP SERPL CALCULATED.3IONS-SCNC: 13 MMOL/L (ref 7–15)
BASOPHILS # BLD AUTO: 0.1 10E3/UL (ref 0–0.2)
BASOPHILS NFR BLD AUTO: 1 %
BUN SERPL-MCNC: 31.8 MG/DL (ref 8–23)
CALCIUM SERPL-MCNC: 9.7 MG/DL (ref 8.8–10.2)
CHLORIDE SERPL-SCNC: 108 MMOL/L (ref 98–107)
CK SERPL-CCNC: 17 U/L (ref 39–308)
CREAT SERPL-MCNC: 1.99 MG/DL (ref 0.67–1.17)
CRP SERPL-MCNC: 3.79 MG/L
DEPRECATED HCO3 PLAS-SCNC: 18 MMOL/L (ref 22–29)
EGFRCR SERPLBLD CKD-EPI 2021: 35 ML/MIN/1.73M2
EOSINOPHIL # BLD AUTO: 1.7 10E3/UL (ref 0–0.7)
EOSINOPHIL NFR BLD AUTO: 17 %
ERYTHROCYTE [DISTWIDTH] IN BLOOD BY AUTOMATED COUNT: 14.7 % (ref 10–15)
GLUCOSE SERPL-MCNC: 119 MG/DL (ref 70–99)
HCT VFR BLD AUTO: 31.8 % (ref 40–53)
HGB BLD-MCNC: 10.5 G/DL (ref 13.3–17.7)
HOLD SPECIMEN: NORMAL
HOLD SPECIMEN: NORMAL
IMM GRANULOCYTES # BLD: 0.1 10E3/UL
IMM GRANULOCYTES NFR BLD: 1 %
LYMPHOCYTES # BLD AUTO: 1.3 10E3/UL (ref 0.8–5.3)
LYMPHOCYTES NFR BLD AUTO: 13 %
MCH RBC QN AUTO: 31.9 PG (ref 26.5–33)
MCHC RBC AUTO-ENTMCNC: 33 G/DL (ref 31.5–36.5)
MCV RBC AUTO: 97 FL (ref 78–100)
MONOCYTES # BLD AUTO: 0.9 10E3/UL (ref 0–1.3)
MONOCYTES NFR BLD AUTO: 9 %
NEUTROPHILS # BLD AUTO: 5.9 10E3/UL (ref 1.6–8.3)
NEUTROPHILS NFR BLD AUTO: 59 %
NRBC # BLD AUTO: 0 10E3/UL
NRBC BLD AUTO-RTO: 0 /100
PLATELET # BLD AUTO: 119 10E3/UL (ref 150–450)
POTASSIUM SERPL-SCNC: 3.9 MMOL/L (ref 3.4–5.3)
RBC # BLD AUTO: 3.29 10E6/UL (ref 4.4–5.9)
SODIUM SERPL-SCNC: 139 MMOL/L (ref 135–145)
WBC # BLD AUTO: 9.8 10E3/UL (ref 4–11)

## 2024-01-31 PROCEDURE — 80048 BASIC METABOLIC PNL TOTAL CA: CPT | Performed by: INTERNAL MEDICINE

## 2024-01-31 PROCEDURE — 82550 ASSAY OF CK (CPK): CPT | Performed by: INTERNAL MEDICINE

## 2024-01-31 PROCEDURE — 85025 COMPLETE CBC W/AUTO DIFF WBC: CPT | Performed by: INTERNAL MEDICINE

## 2024-01-31 PROCEDURE — 86140 C-REACTIVE PROTEIN: CPT | Performed by: INTERNAL MEDICINE

## 2024-01-31 NOTE — TELEPHONE ENCOUNTER
Called and LVM for Linda with [facility]. Gave verbal order for OT to be extended. Once weekly for next two weeks per Dr Boris Lowe. Requested return call to clinic to discuss further if needed. Clinic telephone provided.    Tiffanie Paula LPN

## 2024-02-01 ENCOUNTER — TELEPHONE (OUTPATIENT)
Dept: VASCULAR SURGERY | Facility: CLINIC | Age: 69
End: 2024-02-01
Payer: COMMERCIAL

## 2024-02-01 RX ORDER — OXYCODONE HYDROCHLORIDE 5 MG/1
5 TABLET ORAL
Status: CANCELLED | OUTPATIENT
Start: 2024-02-01

## 2024-02-01 RX ORDER — ONDANSETRON 4 MG/1
4 TABLET, ORALLY DISINTEGRATING ORAL EVERY 30 MIN PRN
Status: CANCELLED | OUTPATIENT
Start: 2024-02-01

## 2024-02-01 RX ORDER — ONDANSETRON 2 MG/ML
4 INJECTION INTRAMUSCULAR; INTRAVENOUS EVERY 30 MIN PRN
Status: CANCELLED | OUTPATIENT
Start: 2024-02-01

## 2024-02-01 RX ORDER — OXYCODONE HYDROCHLORIDE 10 MG/1
10 TABLET ORAL
Status: CANCELLED | OUTPATIENT
Start: 2024-02-01

## 2024-02-01 ASSESSMENT — LIFESTYLE VARIABLES: TOBACCO_USE: 1

## 2024-02-01 NOTE — TELEPHONE ENCOUNTER
Post op scheduled for 4 weeks. Please advise if patient needs to follow up sooner.      Surgery Scheduled    Surgery/Procedure:     Left Stump Revision with complex wound closure     Location: [facility]:  [address] [place] [phone] (Fax: [phone])    Date: 02/02/2024    Time: 9:50am  Writer confirmed with patient that he is aware of time change. He will arrive by 7:50am.    Admission Type: Inpatient    Surgeon:  Dr. Lowe    Entered on provider calendar:  Yes    Post Op: 03/05/2024    Wound Vac Needed:  Yes    Home Care Needed:  Yes    Blood Thinners Addressed:  Yes

## 2024-02-01 NOTE — ANESTHESIA PREPROCEDURE EVALUATION
Anesthesia Pre-Procedure Evaluation    Patient: Alfredo Burnham   MRN: [id number] : 1955        Procedure : Procedure(s):  Left Stump Revision with complex wound closure          Past Medical History:   Diagnosis Date    Hypertension 2011    Macular degeneration       Past Surgical History:   Procedure Laterality Date    AMPUTATE LEG ABOVE KNEE Left 10/17/2023    Procedure: AMPUTATION, ABOVE KNEE and Abdominal wound vac exchange;  Surgeon: Ash Boucher MBBS;  Location: [facility]    AMPUTATE REVISION STUMP LOWER EXTREMITY Left 2023    Procedure: REVISION, AMPUTATION STUMP, LEFT LOWER EXTREMITY;  Surgeon: Boris Lowe;  [facility]    AMPUTATE REVISION STUMP LOWER EXTREMITY Left 12/15/2023    Procedure: IRRIGATION AND DEBRIDEMENT OF LEFT AMPUTATION STUMP, WITH CEMENT ANTIBIOTIC BEAD INSERTION X3;  Surgeon: Boris Lowe;  Location: [facility]    CLOSE SECONDARY WOUND ABDOMEN N/A 10/20/2023    Procedure: Delayed primary subcutaneous abdominal closure;  Surgeon: Boris Lowe;  [facility]    INCISION AND DRAINAGE ABDOMEN WASHOUT, COMBINED N/A 2023    Procedure: abdominal washout, combined, repacking,  abthera placement;  Surgeon: Ash Boucher MBBS;  [facility]    INCISION AND DRAINAGE ABDOMEN WASHOUT, COMBINED N/A 2023    Procedure: Abdominal washout, Retroperitoneal closure, washout left lower extremity wound;  Surgeon: Boris Lowe;  [facility]    INCISION AND DRAINAGE LOWER EXTREMITY, COMBINED Left 2023    Procedure: irrigation and drainage of collection left above knee amputation stump;  Surgeon: Ash Boucher MBBS;  [facility]    IR CVC NON TUNNEL LINE REMOVAL  10/18/2023    IR CVC TUNNEL PLACEMENT > 5 YRS OF AGE  10/18/2023    IR CVC TUNNEL REMOVAL RIGHT  2023    IR OR ANGIOGRAM  2023    IRRIGATION AND DEBRIDEMENT ABDOMEN WASHOUT, COMBINED N/A 2023    Procedure: exploration of  ABDOMINAL CAVITY,  placement of abthera;  Surgeon: Boris Lowe;  [facility]    IRRIGATION AND DEBRIDEMENT ABDOMEN WASHOUT, COMBINED N/A 10/02/2023    Procedure: Exploratory laparotomy, abominal closure, wound vac change left lower extremity;  Surgeon: Jonathan Fry MD;  [facility]    IRRIGATION AND DEBRIDEMENT LOWER EXTREMITY, COMBINED Left 2023    Procedure: exploration of left lower extremity, partial closure of left lower extremity, wound vac placement;  Surgeon: Boris Lowe;  [facility]    LAPAROTOMY EXPLORATORY N/A 2023    Procedure: Laparotomy exploratory;  Surgeon: Roger Shirley MD;  [facility]    PICC INSERTION - DOUBLE LUMEN Right 2023    43-1cm, Basilic vein    REPAIR ANEURYSM ABDOMINAL AORTA N/A 2023    Procedure: Resection of Ruptureed Abdominal Aneurysm, Aortic biliary bypass with 20 x 10 mm Bifurcated hemagard graft, Temporary Abdominal Closure, Endovascular Balloon Inclusion of Aorta;  Surgeon: Boris Lowe;  [facility]    SIGMOIDOSCOPY FLEXIBLE N/A 2023    Procedure: Sigmoidoscopy flexible;  Surgeon: Gabino Walker MD;  [facility]    THROMBECTOMY LOWER EXTREMITY Left 2023    Procedure: SFA, popliteal, and tibial thromboembolectomy and four compartment fasciotomy;  Surgeon: Ash Boucher MBBS;  Location[facility]      Allergies   Allergen Reactions    Penicillins Swelling and Anaphylaxis     Facial swelling    Has tolerated cefazolin.    Cefepime Rash     Diffuse whole body erythematous pruritic rash      Social History     Tobacco Use    Smoking status: Former     Packs/day: 0.50     Years: 40.00     Additional pack years: 0.00     Total pack years: 20.00     Types: Cigarettes     Quit date:      Years since quittin.0    Smokeless tobacco: Not on file    Tobacco comments:     Last cigarette/ETOH 2023   Substance Use Topics    Alcohol use: Not Currently     Comment: 1-2/wk      Wt Readings from  Last 1 Encounters:   01/25/24 59 kg (130 lb)        Anesthesia Evaluation   Pt has had prior anesthetic. Type: General.        ROS/MED HX  ENT/Pulmonary: Comment: Left sided vocal cord palsy due to prolonged intubation.    (+)     RIGO risk factors, snores loudly, hypertension,     vocal cord abnormalities -  Hoarseness,   tobacco use, Past use,  50  Pack-Year Hx,                      Neurologic:     (+)          CVA, date: 2023, without deficits,                    Cardiovascular: Comment: Infrarenal AAA ruptured 9/2023 requiring multiple surgeries      (+)  hypertension- -  CAD -  - -   Taking blood thinners Pt has received instructions: Instructions Given to patient: last dose 1/30/24.                            Previous cardiac testing   Echo: Date: 10/27/23 Results:  Left Ventricle  Global and regional left ventricular function is normal with an EF of 55-60%.  Left ventricular size is normal.     Right Ventricle  Global right ventricular function is normal. The right ventricle is normal  size.     Atria  Both atria appear normal. The left atrial appendage Doppler velocities are  normal. The left atrial appendage is normal. It is free of spontaneous echo  contrast and thrombus. There was no shunt at the atrial septal level as  assessed by color Doppler and agitated saline bubble study at rest and with  Valsalva maneuver. A catheter is noted in the right atrium.     Mitral Valve  The mitral valve is normal. Trace mitral insufficiency is present.     Aortic Valve  The aortic valve is tricuspid. Mild aortic valve sclerosis is present.     Tricuspid Valve  The tricuspid valve is normal. Trace tricuspid insufficiency is present.     Pulmonic Valve  The pulmonic valve is normal. Trace pulmonic insufficiency is present.     Vessels  The aorta root is normal. Grade IV atheroma in descending aorta and aortic  arch. The RUPV Doppler shows normal velocity waveform . The right lower  pulmonary vein cannot be assessed. The  LUPV Doppler shows normal velocity  waveform . The LLPV Doppler shows normal velocity waveform .    Stress Test:  Date: Results:    ECG Reviewed:  Date: Results:    Cath:  Date: Results:   (-) irregular heartbeat/palpitations   METS/Exercise Tolerance: 1 - Eating, dressing    Hematologic:     (+) History of blood clots,    pt is anticoagulated, anemia (hgb 10.5), history of blood transfusion, no previous transfusion reaction,        Musculoskeletal: Comment: Left AKA      GI/Hepatic:     (+) GERD, Asymptomatic on medication,                  Renal/Genitourinary:     (+) renal disease, type: CRI, Pt does not require dialysis,           Endo:  - neg endo ROS     Psychiatric/Substance Use:     (+)   alcohol abuse      Infectious Disease:  - neg infectious disease ROS     Malignancy:  - neg malignancy ROS     Other:            Physical Exam    Airway        Mallampati: II   TM distance: > 3 FB   Neck ROM: full   Mouth opening: < 3 cm    Respiratory Devices and Support         Dental       (+) Minor Abnormalities - some fillings, tiny chips    B=Bridge, C=Chipped, L=Loose, M=Missing    Cardiovascular   cardiovascular exam normal       Rhythm and rate: regular and normal     Pulmonary   pulmonary exam normal        breath sounds clear to auscultation           OUTSIDE LABS:  CBC:   Lab Results   Component Value Date    WBC 9.8 01/31/2024    WBC 6.2 01/24/2024    HGB 10.5 (L) 01/31/2024    HGB 9.4 (L) 01/24/2024    HCT 31.8 (L) 01/31/2024    HCT 28.6 (L) 01/24/2024     (L) 01/31/2024     (L) 01/24/2024     BMP:   Lab Results   Component Value Date     01/31/2024     01/24/2024    POTASSIUM 3.9 01/31/2024    POTASSIUM 3.9 01/24/2024    CHLORIDE 108 (H) 01/31/2024    CHLORIDE 109 (H) 01/24/2024    CO2 18 (L) 01/31/2024    CO2 18 (L) 01/24/2024    BUN 31.8 (H) 01/31/2024    BUN 36.0 (H) 01/24/2024    CR 1.99 (H) 01/31/2024    CR 2.06 (H) 01/24/2024     (H) 01/31/2024     (H) 01/24/2024  "    COAGS:   Lab Results   Component Value Date    PTT 80 (H) 12/12/2023    INR 1.27 (H) 12/06/2023    FIBR 556 (H) 10/25/2023     POC: No results found for: \"BGM\", \"HCG\", \"HCGS\"  HEPATIC:   Lab Results   Component Value Date    ALBUMIN 3.5 01/10/2024    PROTTOTAL 6.2 (L) 01/10/2024    ALT 16 01/10/2024    AST 17 01/10/2024    ALKPHOS 75 01/10/2024    BILITOTAL <0.2 01/10/2024    BHUPENDRA 18 10/25/2023     OTHER:   Lab Results   Component Value Date    PH 7.41 10/14/2023    PH 7.41 10/14/2023    LACT 1.5 10/28/2023    A1C 5.7 (H) 09/25/2023    OMAR 9.7 01/31/2024    PHOS 4.7 (H) 12/18/2023    MAG 1.7 12/18/2023    LIPASE 51 10/14/2023    TSH 16.70 (H) 10/25/2023       Anesthesia Plan    ASA Status:  3    NPO Status:  NPO Appropriate    Anesthesia Type: General.     - Airway: LMA   Induction: Intravenous, Propofol.   Maintenance: Balanced.   Techniques and Equipment:     - Lines/Monitors: BIS     Consents    Anesthesia Plan(s) and associated risks, benefits, and realistic alternatives discussed. Questions answered and patient/representative(s) expressed understanding.     - Discussed: Risks, Benefits and Alternatives for BOTH SEDATION and the PROCEDURE were discussed     - Discussed with:  Patient      - Extended Intubation/Ventilatory Support Discussed: No.      - Patient is DNR/DNI Status: No     Use of blood products discussed: Yes.     - Discussed with: Patient.     - Consented: consented to blood products     Postoperative Care    Pain management: IV analgesics, Oral pain medications, Multi-modal analgesia, Peripheral nerve block (Single Shot).   PONV prophylaxis: Ondansetron (or other 5HT-3), Dexamethasone or Solumedrol     Comments:    Other Comments: 69 yo female PMHx HTN, hx of repaired AAA, hx of left AKA due to lower limb ischemia now, chronic renal failure (Cr baseline 2.0, last GFR 35) presents for left AKA stump revision. Plan pre-op femoral and sciatic nerve blocks, GA with LMA and standard ASA monitors.  "       H&P reviewed: Unable to attach H&P to encounter due to EHR limitations. H&P Update: appropriate H&P reviewed, patient examined. No interval changes since H&P (within 30 days).        Karan Lowe MD    I have reviewed the pertinent notes and labs in the chart from the past 30 days and (re)examined the patient.  Any updates or changes from those notes are reflected in this note.             # Thrombocytopenia: Lowest platelets = 119 in last 2 days, will monitor for bleeding

## 2024-02-02 ENCOUNTER — ANESTHESIA (OUTPATIENT)
Dept: SURGERY | Facility: CLINIC | Age: 69
DRG: 465 | End: 2024-02-02
Payer: COMMERCIAL

## 2024-02-02 ENCOUNTER — HOSPITAL ENCOUNTER (INPATIENT)
Facility: CLINIC | Age: 69
LOS: 3 days | Discharge: HOME HEALTH | DRG: 465 | End: 2024-02-05
Attending: SURGERY | Admitting: SURGERY
Payer: COMMERCIAL

## 2024-02-02 DIAGNOSIS — S88.919A AMPUTATION OF LEG (H): Primary | ICD-10-CM

## 2024-02-02 LAB
ABO/RH(D): NORMAL
ANTIBODY SCREEN: NEGATIVE
GLUCOSE BLDC GLUCOMTR-MCNC: 84 MG/DL (ref 70–99)
SPECIMEN EXPIRATION DATE: NORMAL

## 2024-02-02 PROCEDURE — 999N000141 HC STATISTIC PRE-PROCEDURE NURSING ASSESSMENT: Performed by: SURGERY

## 2024-02-02 PROCEDURE — 250N000011 HC RX IP 250 OP 636: Performed by: STUDENT IN AN ORGANIZED HEALTH CARE EDUCATION/TRAINING PROGRAM

## 2024-02-02 PROCEDURE — 12034 INTMD RPR S/TR/EXT 7.6-12.5: CPT | Mod: 58 | Performed by: SURGERY

## 2024-02-02 PROCEDURE — 250N000013 HC RX MED GY IP 250 OP 250 PS 637

## 2024-02-02 PROCEDURE — 258N000003 HC RX IP 258 OP 636

## 2024-02-02 PROCEDURE — 36592 COLLECT BLOOD FROM PICC: CPT

## 2024-02-02 PROCEDURE — 250N000013 HC RX MED GY IP 250 OP 250 PS 637: Performed by: STUDENT IN AN ORGANIZED HEALTH CARE EDUCATION/TRAINING PROGRAM

## 2024-02-02 PROCEDURE — 0HQJXZZ REPAIR LEFT UPPER LEG SKIN, EXTERNAL APPROACH: ICD-10-PCS | Performed by: SURGERY

## 2024-02-02 PROCEDURE — 710N000010 HC RECOVERY PHASE 1, LEVEL 2, PER MIN: Performed by: SURGERY

## 2024-02-02 PROCEDURE — 370N000017 HC ANESTHESIA TECHNICAL FEE, PER MIN: Performed by: SURGERY

## 2024-02-02 PROCEDURE — 258N000003 HC RX IP 258 OP 636: Performed by: STUDENT IN AN ORGANIZED HEALTH CARE EDUCATION/TRAINING PROGRAM

## 2024-02-02 PROCEDURE — 120N000002 HC R&B MED SURG/OB UMMC

## 2024-02-02 PROCEDURE — 250N000009 HC RX 250: Performed by: STUDENT IN AN ORGANIZED HEALTH CARE EDUCATION/TRAINING PROGRAM

## 2024-02-02 PROCEDURE — 271N000002 HC RX 271: Performed by: STUDENT IN AN ORGANIZED HEALTH CARE EDUCATION/TRAINING PROGRAM

## 2024-02-02 PROCEDURE — 250N000011 HC RX IP 250 OP 636

## 2024-02-02 PROCEDURE — 250N000025 HC SEVOFLURANE, PER MIN: Performed by: SURGERY

## 2024-02-02 PROCEDURE — 0JBM0ZZ EXCISION OF LEFT UPPER LEG SUBCUTANEOUS TISSUE AND FASCIA, OPEN APPROACH: ICD-10-PCS | Performed by: SURGERY

## 2024-02-02 PROCEDURE — 360N000076 HC SURGERY LEVEL 3, PER MIN: Performed by: SURGERY

## 2024-02-02 PROCEDURE — 250N000009 HC RX 250

## 2024-02-02 PROCEDURE — 99223 1ST HOSP IP/OBS HIGH 75: CPT | Mod: 24 | Performed by: INTERNAL MEDICINE

## 2024-02-02 PROCEDURE — 86900 BLOOD TYPING SEROLOGIC ABO: CPT

## 2024-02-02 PROCEDURE — 272N000001 HC OR GENERAL SUPPLY STERILE: Performed by: SURGERY

## 2024-02-02 RX ORDER — FENTANYL CITRATE 50 UG/ML
50 INJECTION, SOLUTION INTRAMUSCULAR; INTRAVENOUS EVERY 5 MIN PRN
Status: DISCONTINUED | OUTPATIENT
Start: 2024-02-02 | End: 2024-02-02 | Stop reason: HOSPADM

## 2024-02-02 RX ORDER — AMOXICILLIN 250 MG
1 CAPSULE ORAL 2 TIMES DAILY
Status: DISCONTINUED | OUTPATIENT
Start: 2024-02-02 | End: 2024-02-05 | Stop reason: HOSPADM

## 2024-02-02 RX ORDER — METOPROLOL TARTRATE 50 MG
100 TABLET ORAL 2 TIMES DAILY
Status: DISCONTINUED | OUTPATIENT
Start: 2024-02-02 | End: 2024-02-05 | Stop reason: HOSPADM

## 2024-02-02 RX ORDER — NALOXONE HYDROCHLORIDE 0.4 MG/ML
0.4 INJECTION, SOLUTION INTRAMUSCULAR; INTRAVENOUS; SUBCUTANEOUS
Status: DISCONTINUED | OUTPATIENT
Start: 2024-02-02 | End: 2024-02-05 | Stop reason: HOSPADM

## 2024-02-02 RX ORDER — AMLODIPINE BESYLATE 10 MG/1
10 TABLET ORAL DAILY
Status: DISCONTINUED | OUTPATIENT
Start: 2024-02-03 | End: 2024-02-05 | Stop reason: HOSPADM

## 2024-02-02 RX ORDER — NALOXONE HYDROCHLORIDE 0.4 MG/ML
0.4 INJECTION, SOLUTION INTRAMUSCULAR; INTRAVENOUS; SUBCUTANEOUS
Status: DISCONTINUED | OUTPATIENT
Start: 2024-02-02 | End: 2024-02-02 | Stop reason: HOSPADM

## 2024-02-02 RX ORDER — ONDANSETRON 4 MG/1
4 TABLET, ORALLY DISINTEGRATING ORAL EVERY 30 MIN PRN
Status: DISCONTINUED | OUTPATIENT
Start: 2024-02-02 | End: 2024-02-02 | Stop reason: HOSPADM

## 2024-02-02 RX ORDER — BISACODYL 10 MG
10 SUPPOSITORY, RECTAL RECTAL DAILY PRN
Status: DISCONTINUED | OUTPATIENT
Start: 2024-02-02 | End: 2024-02-05 | Stop reason: HOSPADM

## 2024-02-02 RX ORDER — FLUMAZENIL 0.1 MG/ML
0.2 INJECTION, SOLUTION INTRAVENOUS
Status: DISCONTINUED | OUTPATIENT
Start: 2024-02-02 | End: 2024-02-02 | Stop reason: HOSPADM

## 2024-02-02 RX ORDER — PROPOFOL 10 MG/ML
INJECTION, EMULSION INTRAVENOUS PRN
Status: DISCONTINUED | OUTPATIENT
Start: 2024-02-02 | End: 2024-02-02

## 2024-02-02 RX ORDER — NALOXONE HYDROCHLORIDE 0.4 MG/ML
0.2 INJECTION, SOLUTION INTRAMUSCULAR; INTRAVENOUS; SUBCUTANEOUS
Status: DISCONTINUED | OUTPATIENT
Start: 2024-02-02 | End: 2024-02-05 | Stop reason: HOSPADM

## 2024-02-02 RX ORDER — EPHEDRINE SULFATE 50 MG/ML
INJECTION, SOLUTION INTRAMUSCULAR; INTRAVENOUS; SUBCUTANEOUS PRN
Status: DISCONTINUED | OUTPATIENT
Start: 2024-02-02 | End: 2024-02-02

## 2024-02-02 RX ORDER — ONDANSETRON 2 MG/ML
4 INJECTION INTRAMUSCULAR; INTRAVENOUS EVERY 30 MIN PRN
Status: DISCONTINUED | OUTPATIENT
Start: 2024-02-02 | End: 2024-02-02 | Stop reason: HOSPADM

## 2024-02-02 RX ORDER — BUPIVACAINE HYDROCHLORIDE 2.5 MG/ML
INJECTION, SOLUTION EPIDURAL; INFILTRATION; INTRACAUDAL
Status: COMPLETED | OUTPATIENT
Start: 2024-02-02 | End: 2024-02-02

## 2024-02-02 RX ORDER — SODIUM CHLORIDE, SODIUM LACTATE, POTASSIUM CHLORIDE, CALCIUM CHLORIDE 600; 310; 30; 20 MG/100ML; MG/100ML; MG/100ML; MG/100ML
INJECTION, SOLUTION INTRAVENOUS CONTINUOUS
Status: DISCONTINUED | OUTPATIENT
Start: 2024-02-02 | End: 2024-02-02 | Stop reason: HOSPADM

## 2024-02-02 RX ORDER — HYDROMORPHONE HCL IN WATER/PF 6 MG/30 ML
0.2 PATIENT CONTROLLED ANALGESIA SYRINGE INTRAVENOUS
Status: DISCONTINUED | OUTPATIENT
Start: 2024-02-02 | End: 2024-02-05 | Stop reason: HOSPADM

## 2024-02-02 RX ORDER — POLYETHYLENE GLYCOL 3350 17 G/17G
17 POWDER, FOR SOLUTION ORAL DAILY
Status: DISCONTINUED | OUTPATIENT
Start: 2024-02-03 | End: 2024-02-05 | Stop reason: HOSPADM

## 2024-02-02 RX ORDER — LIDOCAINE 40 MG/G
CREAM TOPICAL
Status: DISCONTINUED | OUTPATIENT
Start: 2024-02-02 | End: 2024-02-02 | Stop reason: HOSPADM

## 2024-02-02 RX ORDER — ONDANSETRON 4 MG/1
4 TABLET, ORALLY DISINTEGRATING ORAL EVERY 6 HOURS PRN
Status: DISCONTINUED | OUTPATIENT
Start: 2024-02-02 | End: 2024-02-05 | Stop reason: HOSPADM

## 2024-02-02 RX ORDER — FENTANYL CITRATE 50 UG/ML
INJECTION, SOLUTION INTRAMUSCULAR; INTRAVENOUS PRN
Status: DISCONTINUED | OUTPATIENT
Start: 2024-02-02 | End: 2024-02-02

## 2024-02-02 RX ORDER — ATORVASTATIN CALCIUM 10 MG/1
10 TABLET, FILM COATED ORAL EVERY EVENING
Status: DISCONTINUED | OUTPATIENT
Start: 2024-02-02 | End: 2024-02-05 | Stop reason: HOSPADM

## 2024-02-02 RX ORDER — DEXMEDETOMIDINE HYDROCHLORIDE 4 UG/ML
INJECTION, SOLUTION INTRAVENOUS
Status: COMPLETED | OUTPATIENT
Start: 2024-02-02 | End: 2024-02-02

## 2024-02-02 RX ORDER — FENTANYL CITRATE 50 UG/ML
25 INJECTION, SOLUTION INTRAMUSCULAR; INTRAVENOUS EVERY 5 MIN PRN
Status: DISCONTINUED | OUTPATIENT
Start: 2024-02-02 | End: 2024-02-02 | Stop reason: HOSPADM

## 2024-02-02 RX ORDER — ACETAMINOPHEN 325 MG/1
650 TABLET ORAL EVERY 4 HOURS PRN
Status: DISCONTINUED | OUTPATIENT
Start: 2024-02-05 | End: 2024-02-05 | Stop reason: HOSPADM

## 2024-02-02 RX ORDER — NALOXONE HYDROCHLORIDE 0.4 MG/ML
0.2 INJECTION, SOLUTION INTRAMUSCULAR; INTRAVENOUS; SUBCUTANEOUS
Status: DISCONTINUED | OUTPATIENT
Start: 2024-02-02 | End: 2024-02-02 | Stop reason: HOSPADM

## 2024-02-02 RX ORDER — DEXAMETHASONE SODIUM PHOSPHATE 10 MG/ML
INJECTION, SOLUTION INTRAMUSCULAR; INTRAVENOUS
Status: COMPLETED | OUTPATIENT
Start: 2024-02-02 | End: 2024-02-02

## 2024-02-02 RX ORDER — ONDANSETRON 2 MG/ML
4 INJECTION INTRAMUSCULAR; INTRAVENOUS EVERY 6 HOURS PRN
Status: DISCONTINUED | OUTPATIENT
Start: 2024-02-02 | End: 2024-02-05 | Stop reason: HOSPADM

## 2024-02-02 RX ORDER — SODIUM CHLORIDE, SODIUM LACTATE, POTASSIUM CHLORIDE, CALCIUM CHLORIDE 600; 310; 30; 20 MG/100ML; MG/100ML; MG/100ML; MG/100ML
INJECTION, SOLUTION INTRAVENOUS CONTINUOUS PRN
Status: DISCONTINUED | OUTPATIENT
Start: 2024-02-02 | End: 2024-02-02

## 2024-02-02 RX ORDER — HYDROMORPHONE HCL IN WATER/PF 6 MG/30 ML
0.2 PATIENT CONTROLLED ANALGESIA SYRINGE INTRAVENOUS EVERY 5 MIN PRN
Status: DISCONTINUED | OUTPATIENT
Start: 2024-02-02 | End: 2024-02-02 | Stop reason: HOSPADM

## 2024-02-02 RX ORDER — PROPOFOL 10 MG/ML
INJECTION, EMULSION INTRAVENOUS CONTINUOUS PRN
Status: DISCONTINUED | OUTPATIENT
Start: 2024-02-02 | End: 2024-02-02

## 2024-02-02 RX ORDER — PANTOPRAZOLE SODIUM 40 MG/1
40 TABLET, DELAYED RELEASE ORAL EVERY 24 HOURS
Status: DISCONTINUED | OUTPATIENT
Start: 2024-02-02 | End: 2024-02-05 | Stop reason: HOSPADM

## 2024-02-02 RX ORDER — DEXAMETHASONE SODIUM PHOSPHATE 4 MG/ML
INJECTION, SOLUTION INTRA-ARTICULAR; INTRALESIONAL; INTRAMUSCULAR; INTRAVENOUS; SOFT TISSUE PRN
Status: DISCONTINUED | OUTPATIENT
Start: 2024-02-02 | End: 2024-02-02

## 2024-02-02 RX ORDER — LANOLIN ALCOHOL/MO/W.PET/CERES
3 CREAM (GRAM) TOPICAL EVERY EVENING
Status: DISCONTINUED | OUTPATIENT
Start: 2024-02-02 | End: 2024-02-05 | Stop reason: HOSPADM

## 2024-02-02 RX ORDER — FENTANYL CITRATE 50 UG/ML
25-50 INJECTION, SOLUTION INTRAMUSCULAR; INTRAVENOUS
Status: DISCONTINUED | OUTPATIENT
Start: 2024-02-02 | End: 2024-02-02 | Stop reason: HOSPADM

## 2024-02-02 RX ORDER — OXYCODONE HYDROCHLORIDE 5 MG/1
5 TABLET ORAL EVERY 4 HOURS PRN
Status: DISCONTINUED | OUTPATIENT
Start: 2024-02-02 | End: 2024-02-05 | Stop reason: HOSPADM

## 2024-02-02 RX ORDER — ACETAMINOPHEN 325 MG/1
975 TABLET ORAL EVERY 8 HOURS
Qty: 27 TABLET | Refills: 0 | Status: COMPLETED | OUTPATIENT
Start: 2024-02-02 | End: 2024-02-05

## 2024-02-02 RX ORDER — ONDANSETRON 2 MG/ML
INJECTION INTRAMUSCULAR; INTRAVENOUS PRN
Status: DISCONTINUED | OUTPATIENT
Start: 2024-02-02 | End: 2024-02-02

## 2024-02-02 RX ORDER — VITAMIN B COMPLEX
50 TABLET ORAL EVERY 24 HOURS
Status: DISCONTINUED | OUTPATIENT
Start: 2024-02-02 | End: 2024-02-05 | Stop reason: HOSPADM

## 2024-02-02 RX ORDER — ACETAMINOPHEN 325 MG/1
975 TABLET ORAL ONCE
Status: COMPLETED | OUTPATIENT
Start: 2024-02-02 | End: 2024-02-02

## 2024-02-02 RX ORDER — LIDOCAINE 40 MG/G
CREAM TOPICAL
Status: DISCONTINUED | OUTPATIENT
Start: 2024-02-02 | End: 2024-02-05 | Stop reason: HOSPADM

## 2024-02-02 RX ORDER — HYDROMORPHONE HCL IN WATER/PF 6 MG/30 ML
0.1 PATIENT CONTROLLED ANALGESIA SYRINGE INTRAVENOUS
Status: DISCONTINUED | OUTPATIENT
Start: 2024-02-02 | End: 2024-02-05 | Stop reason: HOSPADM

## 2024-02-02 RX ORDER — CEFAZOLIN SODIUM/WATER 2 G/20 ML
SYRINGE (ML) INTRAVENOUS PRN
Status: DISCONTINUED | OUTPATIENT
Start: 2024-02-02 | End: 2024-02-02

## 2024-02-02 RX ORDER — SODIUM CHLORIDE, SODIUM LACTATE, POTASSIUM CHLORIDE, CALCIUM CHLORIDE 600; 310; 30; 20 MG/100ML; MG/100ML; MG/100ML; MG/100ML
INJECTION, SOLUTION INTRAVENOUS CONTINUOUS
Status: DISCONTINUED | OUTPATIENT
Start: 2024-02-02 | End: 2024-02-03

## 2024-02-02 RX ORDER — HYDROMORPHONE HCL IN WATER/PF 6 MG/30 ML
0.4 PATIENT CONTROLLED ANALGESIA SYRINGE INTRAVENOUS EVERY 5 MIN PRN
Status: DISCONTINUED | OUTPATIENT
Start: 2024-02-02 | End: 2024-02-02 | Stop reason: HOSPADM

## 2024-02-02 RX ADMIN — PROPOFOL 100 MCG/KG/MIN: 10 INJECTION, EMULSION INTRAVENOUS at 10:05

## 2024-02-02 RX ADMIN — BUPIVACAINE HYDROCHLORIDE 20 ML: 2.5 INJECTION, SOLUTION EPIDURAL; INFILTRATION; INTRACAUDAL at 09:20

## 2024-02-02 RX ADMIN — SODIUM CHLORIDE, POTASSIUM CHLORIDE, SODIUM LACTATE AND CALCIUM CHLORIDE: 600; 310; 30; 20 INJECTION, SOLUTION INTRAVENOUS at 09:55

## 2024-02-02 RX ADMIN — DEXAMETHASONE SODIUM PHOSPHATE 1 MG: 10 INJECTION, SOLUTION INTRAMUSCULAR; INTRAVENOUS at 09:35

## 2024-02-02 RX ADMIN — ACETAMINOPHEN 975 MG: 325 TABLET, FILM COATED ORAL at 21:01

## 2024-02-02 RX ADMIN — SODIUM CHLORIDE, POTASSIUM CHLORIDE, SODIUM LACTATE AND CALCIUM CHLORIDE: 600; 310; 30; 20 INJECTION, SOLUTION INTRAVENOUS at 18:40

## 2024-02-02 RX ADMIN — DEXMEDETOMIDINE HYDROCHLORIDE 20 MCG: 4 INJECTION, SOLUTION INTRAVENOUS at 09:20

## 2024-02-02 RX ADMIN — Medication 7 ML/HR: at 12:05

## 2024-02-02 RX ADMIN — SODIUM CHLORIDE, POTASSIUM CHLORIDE, SODIUM LACTATE AND CALCIUM CHLORIDE: 600; 310; 30; 20 INJECTION, SOLUTION INTRAVENOUS at 14:43

## 2024-02-02 RX ADMIN — BUPIVACAINE HYDROCHLORIDE 20 ML: 2.5 INJECTION, SOLUTION EPIDURAL; INFILTRATION; INTRACAUDAL; PERINEURAL at 09:35

## 2024-02-02 RX ADMIN — Medication 20 MCG: at 09:35

## 2024-02-02 RX ADMIN — METOPROLOL TARTRATE 100 MG: 50 TABLET, FILM COATED ORAL at 21:06

## 2024-02-02 RX ADMIN — DEXAMETHASONE SODIUM PHOSPHATE 4 MG: 4 INJECTION, SOLUTION INTRA-ARTICULAR; INTRALESIONAL; INTRAMUSCULAR; INTRAVENOUS; SOFT TISSUE at 10:16

## 2024-02-02 RX ADMIN — DEXAMETHASONE SODIUM PHOSPHATE 1 MG: 10 INJECTION, SOLUTION INTRAMUSCULAR; INTRAVENOUS at 09:20

## 2024-02-02 RX ADMIN — EPHEDRINE SULFATE 5 MG: 5 INJECTION INTRAVENOUS at 10:35

## 2024-02-02 RX ADMIN — ACETAMINOPHEN 975 MG: 325 TABLET, FILM COATED ORAL at 08:38

## 2024-02-02 RX ADMIN — EPHEDRINE SULFATE 10 MG: 5 INJECTION INTRAVENOUS at 10:16

## 2024-02-02 RX ADMIN — Medication 2 G: at 10:28

## 2024-02-02 RX ADMIN — FENTANYL CITRATE 50 MCG: 50 INJECTION, SOLUTION INTRAMUSCULAR; INTRAVENOUS at 09:11

## 2024-02-02 RX ADMIN — FENTANYL CITRATE 50 MCG: 50 INJECTION INTRAMUSCULAR; INTRAVENOUS at 10:04

## 2024-02-02 RX ADMIN — ONDANSETRON 4 MG: 2 INJECTION INTRAMUSCULAR; INTRAVENOUS at 10:51

## 2024-02-02 RX ADMIN — Medication 3 MG: at 23:10

## 2024-02-02 RX ADMIN — EPHEDRINE SULFATE 10 MG: 5 INJECTION INTRAVENOUS at 10:21

## 2024-02-02 RX ADMIN — PROPOFOL 100 MG: 10 INJECTION, EMULSION INTRAVENOUS at 10:05

## 2024-02-02 RX ADMIN — PHENYLEPHRINE HYDROCHLORIDE 100 MCG: 10 INJECTION INTRAVENOUS at 10:46

## 2024-02-02 ASSESSMENT — ACTIVITIES OF DAILY LIVING (ADL)
ADLS_ACUITY_SCORE: 34
ADLS_ACUITY_SCORE: 39
ADLS_ACUITY_SCORE: 34
ADLS_ACUITY_SCORE: 34
ADLS_ACUITY_SCORE: 39

## 2024-02-02 NOTE — LETTER
Transition Communication Hand-off for Care Transitions to Next Level of Care Provider    Name: Alfredo Burnham  : 1955  MRN #: [id number]  Primary Care Provider: [facility]     Primary Clinic: [facility]     Reason for Hospitalization:  AKA stump complication (H) [T87.9]  Amputation of leg (H) [S88.919A]  Admit Date/Time: 2024  7:38 AM  Discharge Date: 2024  Payor Source: Payor: [facility] / Plan: ARE MEDICARE / Product Type: HMO /            Reason for Communication Hand-off Referral: Other      Discharge Plan: Home with home care SN/PT/OT       Concern for non-adherence with plan of care:   N  Discharge Needs Assessment:  Needs      Flowsheet Row Most Recent Value   Equipment Currently Used at Home commode chair, raised toilet seat, shower chair, walker, standard, wheelchair, manual              Future Appointments   Date Time Provider Department Center   2024 12:30 PM Miryam Carrasco APRN CNP [facility]   2/15/2024 12:00 PM Tanika Tellez MD [facility]   2024 12:00 PM Miryam Carrasco APRN CNP [facility]   3/13/2024  1:30 PM Boris Lowe MD [facility]           Referrals       Future Labs/Procedures    Home Care Referral     Comments:    Your provider has ordered home health services. If you have not been contacted within 2 days of your discharge please call the selected Home Care agency listed on your Discharge document.  If a Home Care agency is NOT listed, please call [phone].                Ana Fernando RN    AVS/Discharge Summary is the source of truth; this is a helpful guide for improved communication of patient story

## 2024-02-02 NOTE — PLAN OF CARE
Goal Outcome Evaluation:      Plan of Care Reviewed With: patient    Overall Patient Progress: no change    Pt arrived from PACU @ 1300. A&Ox4, legally blind. LLE wound vac to 125 suction, FARIDA drain coming out of wound vac dressing. Epidural in L femoral running @ 7 ml/hr. Pt denies any pain. Tolerating regular diet. Team paged to request for OT/PT consult to aid in mobility post procedure-awaiting response. Continue POC.

## 2024-02-02 NOTE — OP NOTE
VASCULAR SURGERY OPERATIVE REPORT     LOCATION:    [facility]    Alfredo Burnham  Medical Record #:  [id number]  YOB: 1955  Age:  68 year old       Date of Service: 2/2/2024     Preoperative diagnosis    Enterococcus Faecalis Infection of Amputation Stump with history of stool spillage on stump  Bacteremia  Status post repair of open pararenal aneurysm  Status post left lower extremity above knee amputation    Postoperative diagnosis    Enterococcus Faecalis Infection of Amputation Stump with history of stool spillage on stump now status post source control  Bacteremia  Status post repair of open pararenal aneurysm  Status post left lower extremity above knee amputation    Surgeon: Boris Lowe MD  First Assistant: Ted Maguire MD (Pgy 4 Vascular Surgery resident)  A first assistant actively participated and was necessary for one or more of the following: opening, exposure and visualization during the case, maintaining hemostasis, wound closure resulting in its safe and expeditious completion.       Procedures:  Revision above knee amputation with complex wound closure in two layers  Irrigation and debridement of wound  Removal 1 antibiotic bead  Placement of drain and negative pressure incisional wound vacuum dressing    Findings:   No purulence or signs of infection, hypertrophic granulation tissue debrided. Removal of 1 out of 3 antibiotic beads as the other two were entirely covered by healthy granulated tissue. Complex two layer wound closure    Indication for procedure:    Mr. Burnham is a 68 year old male who presented to with a ruptured pararenal abdominal aortic aneurysm with bulky diffuse atheroma within the aorta.  He was unstable in shock and quite acidotic preventing continued operation for recovery of popliteal emboli and ultimately underwent above knee amputation.  Unfortunately he had stool spillage on his amputation stump and  represented with infection for which he underwent serial debridement and eventual antibiotic bead placement after source control to allow him to recover at home.  He represents for closure and amputation revision.  Risks, benefits, alternatives and indications including but not limited to death, anesthetic or cardiopulmonary complications, bleeding, infection, lymphatic leak, acute renal insufficiency requiring temporary or permanent dialysis, and failure to heal.  We discussed operative conduct including team approach, potential need for blood transfusion, and possibility of performing medical photography.  The patient and his wife Tabby understand the risks and would like to proceed with amputation revision with closure.          Description of procedure:    Operative details:    The patient was brought to the operating room.  Under satisfactory general anesthesia with LMA due to previous vocal cord injury, he was placed in supine position with all pressure points padded in standard fashion.  The leg was widely prepped circumferentially and draped in sterile, standard fashion.  A surgical pause was performed with all members of the surgical team to verify patient, medical record number, birth date, planned surgical procedure, surgical site, allergies, fire risk and perioperative antibiotics.    The wound was carefully inspected after removal of the wound vac dressing. Healthy granulating tissue identified with complete healing of the deep cavity over two of his three antibiotic beads.  One bead was removed. He also had hypertrophic granulation tissue was debrided sharply.  We  irrigated with 3 L of saline.   Once hemostasis was achieved attention was closed to revision of the skin flaps and closure.  The wound was then closed in a two layer fashion with 2-0 PDS and 3-0 nylons for skin.  The debridement size was approximately 10x2x1 cm.    he was transferred to the recovery area in stable condition    Estimated  blood loss: 5 ml     Specimens: none     Complications: none apparent    Plan:  -continue drain and incisional wound vac.          Boris Lowe MD, VI  VASCULAR AND ENDOVASCULAR SURGERY   [facility] Vascular Surgery

## 2024-02-02 NOTE — CONSULTS
[facility] ID SERVICE: NEW CONSULTATION  Patient:  Alfredo Burnham, Date of birth 1955, Medical record number [id number]  Date of Admission: 2/2/2024  Date of Visit:  2/2/2024  Requesting Provider: Boris Figueroa     Assessment & Plan    ID Problem List:  Left AKA stump infection s/p I&D 12/1/2023, I&D w/ vac in place 12/8/2023, I&D 12/15/23 with gentamicin beads placed.   Intra-op cxs 12/8 - from tissue, Enterococcus faecium VRE, Staph epidermidis and from bone - VRE (from 1 out of 2 specimens, which is labeled as proximal).   Intra-op cxs 12/1 - Enterococcus faecium VRE  Non surgical wound cxs 11/29, VRE (E.faecium), Daptomycin DSS (EVELIO 3), S- linezolid, tigecycline  S/p wound closure 2/2/23. 2 of 3 gentamicin beads were left in place because they were covered with granulation tissue.   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 Last debridement 12/15/23 with gent beads placed, wound vac in place. Then pathology did not show osteomyelitis, intra op cultures remained negative. Patient underwent incisional closure with vascular sugery 2/2/24. Everything appeared healthy and no new cultures were obtained. Two gentamicin beads were left in place due to coverage with granulation tissue and there is no plan to remove them.     Mr. Burnham has currently completed 7 weeks of daptomycin therapy and his wound looked healthy today at the time of wound closure. His PICC line insertion site is very irritated and I'm concerned it could become an infection  risk itself if we leave it in place much longer. We will plan to continue daptomycin while inpatient and then discontinue on discharge. PICC can be removed at any time. We discussed transitioning to oral therapy with linezolid or omadacycline, but since he will have had 7-8 weeks of therapy by the time he is discharged and everything looked healthy during surgery, will plan to stop antibiotics and monitor.     I am a little concerned that the cement beads will stay in place as foreign bodies. Since they were antibiotic impregnated and placed during 12/15 surgery when cultures were negative, they hopefully should not have been colonized with VRE. However, if his infection recurs he would need lifelong suppression as long as the beads stay in place.     Recommendations:  Continue iv Daptomycin 12mg/kg Q48H (renal dosing) through hospitalization, then stop at discharge   Pull PICC any time between now and discharge   No plan for oral antibiotics at discharge  Will check post-op CRP for new baseline (ordered)  Will plan for ID follow-up in 2 weeks with repeat CRP. We will arrange this.     It has been a pleasure to be involved with this patient's care. We will sign off for now, but please feel free to call with additional questions.     Tanika Tellez MD  Infectious Diseases  Pager 2016      History of Present Illness   67 yo M with blindness and recent prolonged hospitalization for AAA s/p open repair c/b shock, colonic and LLE ischemia s/p AKA complicated by VRE osteomyelitis of stump, daptomycin DSS (EVELIO 3), S- linezolid, tigecycline. Last debridement 12/15/23 with gent beads placed, wound vac in place. Then pathology did not show osteomyelitis, intra op cultures remained negative. Patient underwent incisional closure with vascular sugery 2/2/24. Everything appeared healthy and no new cultures were obtained. Two gentamicin beads were left in place due to coverage with granulation tissue and there is no plan to  "remove them.     His PICC site is very itchy and irritated and it's difficult to draw labs from it. Patient is looking forward to having it removed. No other new problems today.     Physical Exam     Vital signs:  BP (!) 142/78 (BP Location: Left arm, Cuff Size: Adult Regular)   Pulse 64   Temp (!) 96.7  F (35.9  C) (Axillary)   Resp 18   Ht 1.702 m (5' 7\")   Wt 56.3 kg (124 lb 1.9 oz)   SpO2 100%   BMI 19.44 kg/m      GENERAL: alert and no distress  NECK: no adenopathy, no asymmetry, masses, or scars  RESP: lungs clear to auscultation - no rales, rhonchi or wheezes  CV: regular rate and rhythm, normal S1 S2, no S3 or S4, no murmur, click or rub, no peripheral edema  ABDOMEN: soft, nontender, no hepatosplenomegaly, no masses and bowel sounds normal  MS: s/p left AKA stump with incisional wound vac and drain in place    RUE PICC with significant irritation under Tegaderm. Does not appear to be infection.     Data   Laboratory data and imaging listed below was reviewed prior to this encounter.     CK level wnl   Last CRP normal    Microbiology:    Culture   Date Value Ref Range Status   12/15/2023 No anaerobic organisms isolated  Final   12/15/2023 No Growth  Final   12/15/2023 No Growth  Final   12/15/2023 No anaerobic organisms isolated  Final   12/15/2023 No Growth  Final   12/15/2023 No Growth  Final   12/15/2023 No anaerobic organisms isolated  Final   12/15/2023 No Growth  Final   12/15/2023 No Growth  Final   12/15/2023 No anaerobic organisms isolated  Final   12/15/2023 No Growth  Final   12/15/2023 No Growth  Final   12/08/2023 No anaerobic organisms isolated  Final   12/08/2023 No Growth  Final   12/08/2023 1+ Enterococcus faecium VRE (A)  Final     Comment:     Susceptibilities done on previous cultures   12/08/2023 No anaerobic organisms isolated  Final   12/08/2023 Isolated in broth only Enterococcus faecium VRE (A)  Final     Comment:     On day 3 of incubation  Not isolated or reported on routine " aerobic culture  Susceptibilities done on previous cultures   12/08/2023 No Growth  Final   12/08/2023 No Growth  Final   12/08/2023 No anaerobic organisms isolated  Final   12/08/2023 No Growth  Final   12/08/2023 Isolated in broth only Enterococcus faecium VRE (A)  Corrected     Comment:     On day 2 of incubation  Susceptibilities done on previous cultures  Corrected result: Previously reported as Enterococcus avium (VRE) on 12/12/2023 at  7:29 AM CST.   12/08/2023 No anaerobic organisms isolated  Final   12/08/2023 No Growth  Final   12/08/2023 2+ Enterococcus faecium VRE (A)  Final   12/08/2023 2+ Staphylococcus epidermidis (A)  Final     Comment:     Susceptibilities not routinely done, refer to antibiogram to view typical susceptibility profiles   12/01/2023 No anaerobic organisms isolated  Final   12/01/2023 1+ Enterococcus faecium VRE (A)  Final     Comment:     Susceptibilities done on previous cultures   11/29/2023 1+ Enterococcus faecium VRE (A)  Final   11/29/2023 No Growth  Final   11/29/2023 No Growth  Final   10/25/2023 No Growth  Final   10/25/2023 No Growth  Final   10/17/2023 2+ Staphylococcus epidermidis (A)  Final     Comment:     Susceptibilities not routinely done, refer to antibiogram to view typical susceptibility profiles   10/17/2023 1+ Candida albicans (A)  Final     Comment:     Susceptibilities not routinely done, refer to antibiogram to view typical susceptibility profiles   10/14/2023 No Growth  Final   10/14/2023 No Growth  Final   10/09/2023 No Growth  Final   10/09/2023 No Growth  Final   10/01/2023 2+ Candida albicans (A)  Final     Comment:     Susceptibilities not routinely done, refer to antibiogram to view typical susceptibility profiles   10/01/2023 2+ Debbi krusei (A)  Final     Comment:     Susceptibilities not routinely done, refer to antibiogram to view typical susceptibility profiles   10/01/2023 1+ Normal hakan  Final   10/01/2023 No Growth  Final   10/01/2023 No  Growth  Final   09/28/2023 3+ Candida albicans (A)  Final     Comment:     Susceptibilities not routinely done, refer to antibiogram to view typical susceptibility profiles   09/28/2023 1+ Aspergillus fumigatus complex (A)  Final   09/28/2023 3+ Normal hakan  Final     Urine Studies:    Recent Labs   Lab Test 10/01/23  1049 09/30/23  1029 09/30/23  0648   LEUKEST Negative Negative Negative   WBCU 7* 7* 12*     Hematology Studies:    Recent Labs   Lab Test 01/31/24  1030 01/24/24  1030 01/19/24  1030 01/10/24  1711 01/03/24  1430 12/29/23  1127 10/27/23  1255 10/27/23  0548 10/26/23  1051 10/26/23  0700 10/24/23  0756 10/24/23  0651 10/23/23  0719 10/23/23  0555 10/22/23  1309 10/22/23  0422 10/21/23  0354   WBC 9.8 6.2 7.0 8.6 8.5 7.4   < > 11.5*   < > 13.2*   < > 12.4*  --  12.6*  --  11.3* 13.9*   ANEU  --   --   --   --   --   --   --  8.1  --  9.4*  --  9.5*  --  9.3*  --  8.4* 9.3*   AEOS  --   --   --   --   --   --   --  3.0*  --  2.4*  --  1.9*  --  2.4*  --  2.0* 1.8*   HGB 10.5* 9.4* 9.5* 8.9* 9.6* 10.6*   < > 8.4*   < > 8.4*   < > 5.8*   < > 6.8*   < > 7.1* 8.0*   MCV 97 96 96 95 96 99   < > 94   < > 96   < > 95  --  96  --  94 90   * 111* 136* 112* 115* 138*   < > 124*   < > 128*   < > 142*  --  185  --  167 159    < > = values in this interval not displayed.      Metabolic Studies:   Recent Labs   Lab Test 01/31/24  1030 01/24/24  1030 01/19/24  1030 01/10/24  1711 01/03/24  1430    139 140 139 138   POTASSIUM 3.9 3.9 3.9 3.5 4.1   CHLORIDE 108* 109* 109* 108* 108*   CO2 18* 18* 19* 19* 18*   BUN 31.8* 36.0* 31.6* 33.4* 32.6*   CR 1.99* 2.06* 1.95* 2.04* 2.20*   GFRESTIMATED 35* 34* 36* 34* 31*     Hepatic Studies:   Recent Labs   Lab Test 01/10/24  1711 12/29/23  1127 12/08/23  0732 10/30/23  0651 10/29/23  0401 10/28/23  0525   BILITOTAL <0.2 0.2 0.3 0.5 0.4 0.5   ALKPHOS 75 79 71 132* 128 133*   ALBUMIN 3.5 3.9 3.5 3.1* 3.0* 3.1*   AST 17 19 18 24 25 34   ALT 16 21 16 15 19 25      Left  femur proximal bone biopsy 12/15/2023:  -Small fragments of benign bone with no definite evidence of osteomyelitis.

## 2024-02-02 NOTE — BRIEF OP NOTE
[facility]    Brief Operative Note    Pre-operative diagnosis:   1. Left above-knee amputation open wound s/p multiple previous irrigation and debridements with antibiotic bead placement    Post-operative diagnosis Same as pre-operative diagnosis    Procedure:  Left above-knee amputation stump revision with delayed primary closure    Surgeon: Surgeon(s) and Role:     * Boris Lowe MD - Primary     * Ted Maguire MD - Assisting  Anesthesia: General with Block   Estimated Blood Loss: 5 ml    Drains: 10 F drain and incisional wound vac    Specimens: * No specimens in log *  Findings:  One antibiotic bead removed. Very well granulated wound base, decision was made to leave the remaining two antibiotic beads in place  No evidence of local infection  Wound closed with two layer closure using 0 PDS followed by nylons    Complications: None.  Implants:   Implant Name Type Inv. Item Serial No.  Lot No. LRB No. Used Action   Cement bead     NA Left 1 Explanted

## 2024-02-02 NOTE — ANESTHESIA PROCEDURE NOTES
Femoral Procedure Note    Pre-Procedure   Staff -        Anesthesiologist:  Wilfrid Adame MD       Resident/Fellow: Crow Collazo MD       Performed By: resident       Location: pre-op       Procedure Start/Stop Times: 2/2/2024 9:35 AM and 2/2/2024 9:45 AM       Pre-Anesthestic Checklist: patient identified, IV checked, site marked, risks and benefits discussed, informed consent, monitors and equipment checked, pre-op evaluation, at physician/surgeon's request and post-op pain management  Timeout:       Correct Patient: Yes        Correct Procedure: Yes        Correct Site: Yes        Correct Position: Yes        Correct Laterality: Yes        Site Marked: Yes  Procedure Documentation  Procedure: Femoral       Laterality: left       Patient Position: supine       Patient Prep/Sterile Barriers: sterile gloves, mask       Skin prep: Chloraprep       Local skin infiltrated with 4 mL of 1% lidocaine.        Needle Type: Touhy needle       Needle Gauge: 17.        Needle Length (millimeters): 90           Catheter threaded easily.             Ultrasound guided       1. Ultrasound was used to identify targeted nerve, plexus, vascular marker, or fascial plane and place a needle adjacent to it in real-time.       2. Ultrasound was used to visualize the spread of anesthetic in close proximity to the above referenced structure.       3. A permanent image is entered into the patient's record.       4. The visualized anatomic structures appeared normal.       5. There were no apparent abnormal pathologic findings.    Assessment/Narrative         The placement was negative for: blood aspirated, painful injection and site bleeding       Paresthesias: No.       Bolus given via catheter..        Secured via Tegaderm and Dermabond.        Insertion/Infusion Method: Continuous Infusion       Complications: none    Medication(s) Administered   Bupivacaine 0.25% PF (Infiltration) - Infiltration   20 mL - 2/2/2024 9:35:00  "AM  Dexamethasone 10 mg/mL PF (Perineural) - Perineural   1 mg - 2/2/2024 9:35:00 AM  Dexmedetomidine 4 mcg/mL (Perineural) - Perineural   20 mcg - 2/2/2024 9:35:00 AM  Medication Administration Time: 2/2/2024 9:35 AM      FOR [facility] ([facility] ONLY:   Pain Team Contact information: please page the Pain Team Via [employer]. Search \"Pain\". During daytime hours, please page the attending first. At night please page the resident first.      "

## 2024-02-02 NOTE — ANESTHESIA PROCEDURE NOTES
Airway       Patient location during procedure: OR  Staff -        Resident/Fellow: Karan Lowe MD       Performed By: resident  Consent for Airway        Urgency: elective  Indications and Patient Condition       Indications for airway management: filemon-procedural and airway protection       Induction type:intravenous       Mask difficulty assessment: 1 - vent by mask    Final Airway Details       Final airway type: supraglottic airway    Supraglottic Airway Details        Type: LMA       Brand: LMA Unique       LMA size: 3    Post intubation assessment        Placement verified by: capnometry, equal breath sounds and chest rise        Number of attempts at approach: 1       Number of other approaches attempted: 0       Secured with: pink tape and tape       Ease of procedure: easy       Dentition: Unchanged and Intact

## 2024-02-02 NOTE — OR NURSING
Left Sciatic lateral block performed without complications.  VSS.  Pt tolerated well.  Will continue to monitor. Zeina Del Cid RN on 2/2/2024 at 10:17 AM

## 2024-02-02 NOTE — PROGRESS NOTES
Admitted/transferred from: PACU  2 RN full  skin assessment completed by Davina Pearson, RN and Mario AGRAWAL RN.  Skin assessment finding: dry flaky skin on RLE, generalized scabs on LLE, wound vac on L lower extremity AKA site w/ FARIDA, L femoral tap block, slight blanchable redness around PICC dressing-pt states due to skin irritation from dressing    Interventions/actions: hovermat removed, educated on importance of shifting weight      Bedside Emergency Equipment Present:  Suction Regulator: Yes  Suction Canister: Yes  Tubing between Regulator and Canister: Yes  O2 Regulator with Tree: Yes  Ambu Bag: Yes    Yes

## 2024-02-02 NOTE — ANESTHESIA PROCEDURE NOTES
Sciatic Procedure Note    Pre-Procedure   Staff -        Anesthesiologist:  Wilfrid Adame MD       Resident/Fellow: Crow Collazo MD       Performed By: resident       Location: pre-op       Procedure Start/Stop Times: 2/2/2024 9:20 AM and 2/2/2024 9:30 AM       Pre-Anesthestic Checklist: patient identified, IV checked, site marked, risks and benefits discussed, informed consent, monitors and equipment checked, pre-op evaluation, at physician/surgeon's request and post-op pain management  Timeout:       Correct Patient: Yes        Correct Procedure: Yes        Correct Site: Yes        Correct Position: Yes        Correct Laterality: Yes        Site Marked: Yes  Procedure Documentation  Procedure: Sciatic       Laterality: left       Patient Position: lateral       Patient Prep/Sterile Barriers: sterile gloves, mask       Skin prep: Chloraprep (gluteal approach).       Needle Type: insulated       Needle Gauge: 21.        Needle Length (millimeters): 110        Ultrasound guided       1. Ultrasound was used to identify targeted nerve, plexus, vascular marker, or fascial plane and place a needle adjacent to it in real-time.       2. Ultrasound was used to visualize the spread of anesthetic in close proximity to the above referenced structure.       3. A permanent image is entered into the patient's record.       4. The visualized anatomic structures appeared normal.       5. There were no apparent abnormal pathologic findings.    Assessment/Narrative         The placement was negative for: blood aspirated, painful injection and site bleeding       Paresthesias: Yes and Resolved.       Bolus given via needle..        Secured via.        Insertion/Infusion Method: Single Shot       Complications: none    Medication(s) Administered   Bupivacaine 0.25% PF (Infiltration) - Infiltration   20 mL - 2/2/2024 9:20:00 AM  Dexamethasone 10 mg/mL PF (Perineural) - Perineural   1 mg - 2/2/2024 9:20:00 AM  Dexmedetomidine 4  "mcg/mL (Perineural) - Perineural   20 mcg - 2/2/2024 9:20:00 AM  Medication Administration Time: 2/2/2024 9:20 AM      FOR [facility] ([facility]) ONLY:   Pain Team Contact information: please page the Pain Team Via [employer]. Search \"Pain\". During daytime hours, please page the attending first. At night please page the resident first.      "

## 2024-02-02 NOTE — PROGRESS NOTES
Pain Service Progress Note  [facility]  Date: 02/03/2024       Patient Name: Alfredo Burnham  MRN: [id number]  Age: 68 year old  Sex: male      Assessment:  Alfredo Burnham is a 68 year old male with a pMHx of htn, CAD, left sided vocal cord palsy, infrarenal AAA rupture 9/2023 sp multiple repairs, left AKA 2/2 lower limb ischemia, GERD, hx of ETOH abuse, and CKD; who presents for a left AKA stump revision w/ Dr. Lowe. Left femoral catheter was placed preoperatively with ropivacaine 0.2% gtt at an initial rate of 7 mL/hr.    Procedure: Left Stump Revision w/complex wound closure    Date of Surgery: 2/2/24    Date of Catheter Placement: 2/2/24    Plan/Recommendations:  1. Regional Anesthesia/Analgesia  -Continuous Catheter Type/Site: left femoral   Infusate: ropivacaine 0.2%  Continuous Infusion at 7 mL/hr    Plan to maintain catheter, max of 7 days    2. Anticoagulation  -Please contact Inpatient Pain Service before ordering or making any anticoagulation changes       3. Multimodal Analgesia  - Per primary team    Pain Service will continue to follow.    Discussed with attending anesthesiologist.        Overnight Events: NAEON    Tubes/Drains: Yes  L anterior knee bulb, negative pressure wound therapy anterior thigh    Subjective: Pain well controlled, brief episode of sharp pain that resolved  Nausea: No  Vomiting: No  Pruritus: No  Symptoms of LAST: No        Diet: Advance Diet as Tolerated: Regular Diet Adult; Regular Diet Adult    Relevant Labs:  Recent Labs   Lab Test 01/31/24  1030 12/13/23  0617 12/12/23  0714 12/06/23  1615 12/06/23  0557   INR  --   --   --   --  1.27*   *   < >  --    < >  --    PTT  --   --  80*   < > 92*   BUN 31.8*   < >  --    < >  --     < > = values in this interval not displayed.       Physical Exam:  Vitals: /61 (BP Location: Left arm, Patient Position: Supine, Cuff Size: Adult Regular)   Pulse 72   Temp 36.8  C (98.2  F)  "(Oral)   Resp 16   Ht 1.702 m (5' 7\")   Wt 56.3 kg (124 lb 1.9 oz)   SpO2 98%   BMI 19.44 kg/m      Physical Exam:   Orientation:  Alert, oriented, and in no acute distress: Yes  Sedation: No    Motor Examination:  5/5 Strength in lower extremities: Yes    Sensory Level:   Decrease in sensation: No    Catheter Site:   Catheter entry site is clean/dry/intact: Yes    Tender: No      Relevant Medications:  Current Pain Medications:  Medications related to Pain Management (From now, onward)      Start     Dose/Rate Route Frequency Ordered Stop    02/02/24 1000  ROPivacaine 0.2% in sodium chloride 0.9% PERINEURAL infusion         7 mL/hr  Perineural Continuous Nerve Block 02/02/24 0950      02/02/24 0830  bupivacaine (MARCAINE) PF 0.25% + dexamethasone (DECADRON) PF 1 mg + dexmedetomidine (PRECEDEX) 20 mcg injection         20 mL Infiltration ONCE 02/02/24 0816      02/02/24 0816  lidocaine 1 % 0.1-1 mL         0.1-1 mL Other EVERY 1 HOUR PRN 02/02/24 0816      02/02/24 0816  lidocaine (LMX4) cream          Topical EVERY 1 HOUR PRN 02/02/24 0816      02/02/24 0816  fentaNYL (PF) (SUBLIMAZE) injection 25-50 mcg         25-50 mcg Intravenous EVERY 2 MIN PRN 02/02/24 0816      02/02/24 0816  midazolam (VERSED) injection 1-2 mg         1-2 mg Intravenous EVERY 4 MIN PRN 02/02/24 0816                  Acute Inpatient Pain Service [facility]  Hours of pain coverage 24/7   Page via [employer]- Please Page the Pain Team Via [employer]: \"PAIN MANAGEMENT ACUTE INPATIENT/ [facility]\"       Bobby Denny, DO  PGY-4, Anesthesiology  [phone]        "

## 2024-02-02 NOTE — ANESTHESIA POSTPROCEDURE EVALUATION
Patient: Alfredo Burnham    Procedure: Procedure(s):  Left Stump Revision with complex wound closure       Anesthesia Type:  General    Note:  Disposition: Inpatient   Postop Pain Control: Uneventful            Sign Out: Well controlled pain   PONV: No   Neuro/Psych: Uneventful            Sign Out: Acceptable/Baseline neuro status   Airway/Respiratory: Uneventful            Sign Out: Acceptable/Baseline resp. status   CV/Hemodynamics: Uneventful            Sign Out: Acceptable CV status; No obvious hypovolemia; No obvious fluid overload   Other NRE: NONE   DID A NON-ROUTINE EVENT OCCUR?            Last vitals:  Vitals Value Taken Time   /79 02/02/24 1200   Temp 36.4  C (97.5  F) 02/02/24 1145   Pulse 64 02/02/24 1206   Resp 10 02/02/24 1206   SpO2 100 % 02/02/24 1206   Vitals shown include unfiled device data.    Electronically Signed By: Anish Chavez Junior, MD  February 2, 2024  12:07 PM

## 2024-02-03 LAB
ANION GAP SERPL CALCULATED.3IONS-SCNC: 9 MMOL/L (ref 7–15)
BUN SERPL-MCNC: 30.3 MG/DL (ref 8–23)
CALCIUM SERPL-MCNC: 9.4 MG/DL (ref 8.8–10.2)
CHLORIDE SERPL-SCNC: 108 MMOL/L (ref 98–107)
CREAT SERPL-MCNC: 1.61 MG/DL (ref 0.67–1.17)
CRP SERPL-MCNC: 5.64 MG/L
DEPRECATED HCO3 PLAS-SCNC: 19 MMOL/L (ref 22–29)
EGFRCR SERPLBLD CKD-EPI 2021: 46 ML/MIN/1.73M2
ERYTHROCYTE [DISTWIDTH] IN BLOOD BY AUTOMATED COUNT: 14.3 % (ref 10–15)
GLUCOSE SERPL-MCNC: 104 MG/DL (ref 70–99)
HCT VFR BLD AUTO: 27.5 % (ref 40–53)
HGB BLD-MCNC: 9 G/DL (ref 13.3–17.7)
MCH RBC QN AUTO: 31.9 PG (ref 26.5–33)
MCHC RBC AUTO-ENTMCNC: 32.7 G/DL (ref 31.5–36.5)
MCV RBC AUTO: 98 FL (ref 78–100)
PLATELET # BLD AUTO: 87 10E3/UL (ref 150–450)
POTASSIUM SERPL-SCNC: 3.8 MMOL/L (ref 3.4–5.3)
RBC # BLD AUTO: 2.82 10E6/UL (ref 4.4–5.9)
SODIUM SERPL-SCNC: 136 MMOL/L (ref 135–145)
WBC # BLD AUTO: 7.3 10E3/UL (ref 4–11)

## 2024-02-03 PROCEDURE — 250N000013 HC RX MED GY IP 250 OP 250 PS 637: Performed by: STUDENT IN AN ORGANIZED HEALTH CARE EDUCATION/TRAINING PROGRAM

## 2024-02-03 PROCEDURE — 999N000044 HC STATISTIC CVC DRESSING CHANGE

## 2024-02-03 PROCEDURE — 250N000011 HC RX IP 250 OP 636: Mod: JZ

## 2024-02-03 PROCEDURE — 120N000002 HC R&B MED SURG/OB UMMC

## 2024-02-03 PROCEDURE — 99231 SBSQ HOSP IP/OBS SF/LOW 25: CPT | Mod: GC | Performed by: ANESTHESIOLOGY

## 2024-02-03 PROCEDURE — 250N000011 HC RX IP 250 OP 636: Performed by: STUDENT IN AN ORGANIZED HEALTH CARE EDUCATION/TRAINING PROGRAM

## 2024-02-03 PROCEDURE — 999N000202 HC STATISTICAL VASC ACCESS NURSE TIME, 1-15 MINUTES

## 2024-02-03 PROCEDURE — 85027 COMPLETE CBC AUTOMATED: CPT | Performed by: STUDENT IN AN ORGANIZED HEALTH CARE EDUCATION/TRAINING PROGRAM

## 2024-02-03 PROCEDURE — 271N000002 HC RX 271: Performed by: STUDENT IN AN ORGANIZED HEALTH CARE EDUCATION/TRAINING PROGRAM

## 2024-02-03 PROCEDURE — 36415 COLL VENOUS BLD VENIPUNCTURE: CPT | Performed by: STUDENT IN AN ORGANIZED HEALTH CARE EDUCATION/TRAINING PROGRAM

## 2024-02-03 PROCEDURE — 86140 C-REACTIVE PROTEIN: CPT | Performed by: INTERNAL MEDICINE

## 2024-02-03 PROCEDURE — 80048 BASIC METABOLIC PNL TOTAL CA: CPT | Performed by: STUDENT IN AN ORGANIZED HEALTH CARE EDUCATION/TRAINING PROGRAM

## 2024-02-03 PROCEDURE — 258N000003 HC RX IP 258 OP 636: Performed by: STUDENT IN AN ORGANIZED HEALTH CARE EDUCATION/TRAINING PROGRAM

## 2024-02-03 PROCEDURE — 258N000003 HC RX IP 258 OP 636

## 2024-02-03 RX ADMIN — ACETAMINOPHEN 975 MG: 325 TABLET, FILM COATED ORAL at 16:55

## 2024-02-03 RX ADMIN — POLYETHYLENE GLYCOL 3350 17 G: 17 POWDER, FOR SOLUTION ORAL at 08:41

## 2024-02-03 RX ADMIN — Medication 50 MCG: at 08:40

## 2024-02-03 RX ADMIN — SENNOSIDES AND DOCUSATE SODIUM 1 TABLET: 8.6; 5 TABLET ORAL at 20:35

## 2024-02-03 RX ADMIN — SODIUM CHLORIDE, POTASSIUM CHLORIDE, SODIUM LACTATE AND CALCIUM CHLORIDE: 600; 310; 30; 20 INJECTION, SOLUTION INTRAVENOUS at 06:36

## 2024-02-03 RX ADMIN — ACETAMINOPHEN 975 MG: 325 TABLET, FILM COATED ORAL at 06:36

## 2024-02-03 RX ADMIN — SENNOSIDES AND DOCUSATE SODIUM 1 TABLET: 8.6; 5 TABLET ORAL at 08:40

## 2024-02-03 RX ADMIN — AMLODIPINE BESYLATE 10 MG: 10 TABLET ORAL at 08:40

## 2024-02-03 RX ADMIN — DAPTOMYCIN 700 MG: 500 INJECTION, POWDER, LYOPHILIZED, FOR SOLUTION INTRAVENOUS at 15:49

## 2024-02-03 RX ADMIN — Medication: at 21:50

## 2024-02-03 RX ADMIN — PANTOPRAZOLE SODIUM 40 MG: 40 TABLET, DELAYED RELEASE ORAL at 06:36

## 2024-02-03 RX ADMIN — Medication 3 MG: at 20:35

## 2024-02-03 RX ADMIN — METOPROLOL TARTRATE 100 MG: 50 TABLET, FILM COATED ORAL at 08:40

## 2024-02-03 RX ADMIN — METOPROLOL TARTRATE 100 MG: 50 TABLET, FILM COATED ORAL at 20:35

## 2024-02-03 RX ADMIN — ACETAMINOPHEN 975 MG: 325 TABLET, FILM COATED ORAL at 21:49

## 2024-02-03 ASSESSMENT — ACTIVITIES OF DAILY LIVING (ADL)
ADLS_ACUITY_SCORE: 39
ADLS_ACUITY_SCORE: 40
ADLS_ACUITY_SCORE: 40
ADLS_ACUITY_SCORE: 39
DEPENDENT_IADLS:: CLEANING;COOKING;LAUNDRY;SHOPPING;TRANSPORTATION
ADLS_ACUITY_SCORE: 39
ADLS_ACUITY_SCORE: 40
ADLS_ACUITY_SCORE: 39

## 2024-02-03 NOTE — PROGRESS NOTES
Care Management Initial Consult    General Information  Assessment completed with: PatientNelson  Type of CM/SW Visit: Initial Assessment    Primary Care Provider verified and updated as needed: Yes   Readmission within the last 30 days: no previous admission in last 30 days      Reason for Consult: discharge planning  Advance Care Planning: Advance Care Planning Reviewed: no concerns identified          Communication Assessment  Patient's communication style: spoken language (English or Bilingual)    Hearing Difficulty or Deaf: no   Wear Glasses or Blind: yes    Cognitive  Cognitive/Neuro/Behavioral: WDL                      Living Environment:   People in home: spouse  Tabby  Current living Arrangements: apartment      Able to return to prior arrangements: yes  Living Arrangement Comments: N/A    Family/Social Support:  Care provided by: spouse/significant other, homecare agency  Provides care for: no one  Marital Status:   Wife, Children  Tabby       Description of Support System: Supportive, Involved    Support Assessment: Adequate family and caregiver support, Adequate social supports    Current Resources:   Patient receiving home care services: Yes  Skilled Home Care Services: Physical Therapy, Occupational Therapy, Skilled Nursing  Community Resources: Home Care  Equipment currently used at home: commode chair, raised toilet seat, shower chair, walker, standard, wheelchair, manual  Supplies currently used at home: Wound Care Supplies    Employment/Financial:  Employment Status: retired, , previous service        Financial Concerns: none   Referral to Financial Worker: No       Does the patient's insurance plan have a 3 day qualifying hospital stay waiver?  No    Lifestyle & Psychosocial Needs:  Social Determinants of Health     Food Insecurity: Not on file   Depression: Not on file   Housing Stability: Not on file   Tobacco Use: Medium Risk (2/2/2024)    Patient History     Smoking Tobacco  Use: Former     Smokeless Tobacco Use: Unknown     Passive Exposure: Not on file   Financial Resource Strain: Not on file   Alcohol Use: Not on file   Transportation Needs: Not on file   Physical Activity: Not on file   Interpersonal Safety: Not on file   Stress: Not on file   Social Connections: Not on file       Functional Status:  Prior to admission patient needed assistance:   Dependent ADLs:: Ambulation-walker, Wheelchair-independent, Wheelchair-with assist, Bathing, Dressing  Dependent IADLs:: Cleaning, Cooking, Laundry, Shopping, Transportation  Assesssment of Functional Status: Not at baseline with ADL Functioning    Mental Health Status:  Mental Health Status: No Current Concerns       Chemical Dependency Status:  Chemical Dependency Status: No Current Concerns             Values/Beliefs:  Spiritual, Cultural Beliefs, Islam Practices, Values that affect care: no               Additional Information:  LIZBET met with Nelson bedside, introduced self and role. Per chart review, Nelson's last CMA was on 12/2/2023 and discharged 12/19/23. LIZBET asked Nelson how his home care, home infusion, and therapies went after discharge. Nelson states that had all gone well and was helpful to him. Nelson states there are no changes in the amount of support he can receive from his wife and family. Nelson also discussed perhaps getting more [facility]like [facility] since [facility].    Initial assessment completed due to high risk readmission score. See details above. Care management will follow for any discharge needs.     JR Maria  Weekend     Social Work and Care Management Department  SEARCHABLE in [employer] - search SOCIAL WORK     [facility] ([phone]) Saturday and Sunday    Units: 4A, 4C, & 4E Pager: [phone]    Units: 5A & 5C Pager: [phone]    Units: 6A & 6B   Pager: [phone]    Units: 6C & 6D Pager: [phone]    Units: 7A & 7B  Pager: [phone]    Units: 7C  Pager: [phone]    Unit: [place] [facility] Pager: [phone]     [facility] [place] ([id number]) Saturday and Sunday      Units: 5 Ortho, 5 Med/Surg & WB ED  Pager:[phone]    Units: 6 Med/Surg, 8A, & 10A ICU  Pager: [phone]    After hours (1630 - 0000) Saturday & Sunday; ([phone]) Mon-Fri; ([phone]) [facility] Recognized Holidays  Units: ALL  Pager: [phone]

## 2024-02-03 NOTE — PLAN OF CARE
Goal Outcome Evaluation:      Plan of Care Reviewed With: patient    Overall Patient Progress: no change    Shift 1930-0730  Pt is A&Ox4, legally blind, able to make needs known. Left stump with wound vac to 125 suction, FARIDA drain with no output. Pt rates pain 1-4/10,  pain is adequately managed with epidural in L femoral running @7 ml/hr and scheduled tylenol x2, prn declined needing PRNs. IV fluids discontinued this morning. Tolerating regular diet, no N/V. Voiding adequate amount in the urinal. +BS, no BM. No acute changes this shift. Continue POC.

## 2024-02-03 NOTE — PROGRESS NOTES
"Vascular surgery post-op check    Resting in bed comfortably, pain controlled, in good spirits.    BP (!) 140/78 (BP Location: Left arm)   Pulse 66   Temp 97.6  F (36.4  C) (Axillary)   Resp 20   Ht 1.702 m (5' 7\")   Wt 56.3 kg (124 lb 1.9 oz)   SpO2 100%   BMI 19.44 kg/m      NAD  NLB on RA  L AKA stump soft, vac intact, scant serosang output in drain bulb    A/P: POD#0 s/p left above-knee amputation revision and primary closure. Recovering well.  - multimodal pain control, L sciatic nerve block  - reg diet  - monitor incisional vac and drain output  - ID consulted, continued on daptomycin, recommendations appreciated  - hold home Emiliano Maguire MD    "

## 2024-02-03 NOTE — PROVIDER NOTIFICATION
[address]  can a PT/OT consult be ordered to help pt with getting out of bed to commode & general mobility post procedure?  thanks, Davina JESSICA [phone]

## 2024-02-04 ENCOUNTER — APPOINTMENT (OUTPATIENT)
Dept: PHYSICAL THERAPY | Facility: CLINIC | Age: 69
DRG: 465 | End: 2024-02-04
Payer: COMMERCIAL

## 2024-02-04 LAB
ANION GAP SERPL CALCULATED.3IONS-SCNC: 10 MMOL/L (ref 7–15)
BUN SERPL-MCNC: 33.5 MG/DL (ref 8–23)
CALCIUM SERPL-MCNC: 9.2 MG/DL (ref 8.8–10.2)
CHLORIDE SERPL-SCNC: 108 MMOL/L (ref 98–107)
CREAT SERPL-MCNC: 1.63 MG/DL (ref 0.67–1.17)
DEPRECATED HCO3 PLAS-SCNC: 19 MMOL/L (ref 22–29)
EGFRCR SERPLBLD CKD-EPI 2021: 45 ML/MIN/1.73M2
ERYTHROCYTE [DISTWIDTH] IN BLOOD BY AUTOMATED COUNT: 14.8 % (ref 10–15)
GLUCOSE SERPL-MCNC: 94 MG/DL (ref 70–99)
HCT VFR BLD AUTO: 30.2 % (ref 40–53)
HGB BLD-MCNC: 10 G/DL (ref 13.3–17.7)
MCH RBC QN AUTO: 32.9 PG (ref 26.5–33)
MCHC RBC AUTO-ENTMCNC: 33.1 G/DL (ref 31.5–36.5)
MCV RBC AUTO: 99 FL (ref 78–100)
PLATELET # BLD AUTO: 100 10E3/UL (ref 150–450)
POTASSIUM SERPL-SCNC: 4.2 MMOL/L (ref 3.4–5.3)
RBC # BLD AUTO: 3.04 10E6/UL (ref 4.4–5.9)
SODIUM SERPL-SCNC: 137 MMOL/L (ref 135–145)
WBC # BLD AUTO: 6.8 10E3/UL (ref 4–11)

## 2024-02-04 PROCEDURE — 120N000002 HC R&B MED SURG/OB UMMC

## 2024-02-04 PROCEDURE — 97530 THERAPEUTIC ACTIVITIES: CPT | Mod: GP

## 2024-02-04 PROCEDURE — 97161 PT EVAL LOW COMPLEX 20 MIN: CPT | Mod: GP

## 2024-02-04 PROCEDURE — 36592 COLLECT BLOOD FROM PICC: CPT | Performed by: STUDENT IN AN ORGANIZED HEALTH CARE EDUCATION/TRAINING PROGRAM

## 2024-02-04 PROCEDURE — 80048 BASIC METABOLIC PNL TOTAL CA: CPT | Performed by: STUDENT IN AN ORGANIZED HEALTH CARE EDUCATION/TRAINING PROGRAM

## 2024-02-04 PROCEDURE — 85027 COMPLETE CBC AUTOMATED: CPT | Performed by: STUDENT IN AN ORGANIZED HEALTH CARE EDUCATION/TRAINING PROGRAM

## 2024-02-04 PROCEDURE — 99231 SBSQ HOSP IP/OBS SF/LOW 25: CPT | Mod: GC | Performed by: ANESTHESIOLOGY

## 2024-02-04 PROCEDURE — 999N000111 HC STATISTIC OT IP EVAL DEFER: Performed by: OCCUPATIONAL THERAPIST

## 2024-02-04 PROCEDURE — 250N000013 HC RX MED GY IP 250 OP 250 PS 637: Performed by: STUDENT IN AN ORGANIZED HEALTH CARE EDUCATION/TRAINING PROGRAM

## 2024-02-04 RX ADMIN — ACETAMINOPHEN 975 MG: 325 TABLET, FILM COATED ORAL at 07:09

## 2024-02-04 RX ADMIN — POLYETHYLENE GLYCOL 3350 17 G: 17 POWDER, FOR SOLUTION ORAL at 09:10

## 2024-02-04 RX ADMIN — Medication 3 MG: at 20:37

## 2024-02-04 RX ADMIN — PANTOPRAZOLE SODIUM 40 MG: 40 TABLET, DELAYED RELEASE ORAL at 07:09

## 2024-02-04 RX ADMIN — METOPROLOL TARTRATE 100 MG: 50 TABLET, FILM COATED ORAL at 20:37

## 2024-02-04 RX ADMIN — ACETAMINOPHEN 975 MG: 325 TABLET, FILM COATED ORAL at 15:07

## 2024-02-04 RX ADMIN — APIXABAN 5 MG: 5 TABLET, FILM COATED ORAL at 20:37

## 2024-02-04 RX ADMIN — METOPROLOL TARTRATE 100 MG: 50 TABLET, FILM COATED ORAL at 09:10

## 2024-02-04 RX ADMIN — Medication 50 MCG: at 09:11

## 2024-02-04 RX ADMIN — AMLODIPINE BESYLATE 10 MG: 10 TABLET ORAL at 09:10

## 2024-02-04 ASSESSMENT — ACTIVITIES OF DAILY LIVING (ADL)
ADLS_ACUITY_SCORE: 40

## 2024-02-04 NOTE — PLAN OF CARE
Goal Outcome Evaluation:      Plan of Care Reviewed With: patient    Overall Patient Progress: improving    Epidural removed this AM, pt c/o minimal pain, declines PRN pain medications, managed w/ scheduled tylenol. SBA pivot to commode. 1x BM this shift. Good appetite. Plan to get last dose of abx tomorrow & remove PICC. Continue POC.

## 2024-02-04 NOTE — PROGRESS NOTES
Vascular Surgery Progress Note    No issues. Pain controlled.  VS reviewed and are normal.    Exam  Resting in bed, NAD, NLB on RA  L AKA stump with intact incisional wound vac, no hematoma, appropriately tender  Scant output in drain and vac    Labs reviewed    A/P: POD #1 s/p Left AKA revision. Recovering well.    - multimodal pain control with femoral nerve catheter  - ID consulted, appreciate recs, cont daptomycin  - PTA eliquis for PE held while catheter in place  - regular diet  - continue incisional wound vac and drain    Seen and d/w staff, Dr. Lowe.    Ted Maguire MD

## 2024-02-04 NOTE — PROGRESS NOTES
"Vascular Surgery Progress Note    No issues overnight. No complaints this morning.    BP (!) 148/80 (BP Location: Left arm, Patient Position: Supine)   Pulse 65   Temp 97.7  F (36.5  C) (Oral)   Resp 16   Ht 1.702 m (5' 7\")   Wt 56.3 kg (124 lb 1.9 oz)   SpO2 100%   BMI 19.44 kg/m      Resting in bed  NAD  L AKA stump with wrap intact, soft, appropriately tender, no hematoma  Scant output in drain and vac    Labs reviewed. Cr at baseline, 1.3. Hgb 10.0 and stable, no leukocytosis    A/P: POD #2 s/p Left AKA revision. Recovering well.    - discussed with infectious disease, continue daptomycin q48h until discharge, then will remove PICC  - discussed anesthesia to remove femoral nerve catheter  - resume PTA Eliquis for PEs after catheter removed  - regular diet  - continue incisional wound vac and drain    Seen and d/w staff, Dr. Lowe.    Ted Maguire MD    "

## 2024-02-04 NOTE — PLAN OF CARE
Goal Outcome Evaluation:      Plan of Care Reviewed With: patient    Overall Patient Progress: improving     Shift 1930-0730  A&Ox4, legally blind, calm/cooperative. Left stump ACE wrap with wound vac to 125 suction, FARIDA drain with no output. Pt rates pain 1-2/10,  pain is adequately managed with epidural in L femoral running @7 ml/hr (stopped this am) and scheduled tylenol x2 with relief, pt declined needing PRNs. Denies numbness and tingling this shift. Tolerating regular diet, no nausea. Voiding adequate amount in the urinal. +BS, +BS, no BM. No acute changes this shift. Continue POC.

## 2024-02-04 NOTE — PLAN OF CARE
Occupational Therapy: Orders received. Chart reviewed and discussed with care team.? Occupational Therapy not indicated as pt near his functional baseline and has assist from family for ADLs as needed.? Defer discharge recommendations to PT.? Will complete orders.

## 2024-02-04 NOTE — PLAN OF CARE
Goal Outcome Evaluation:      Plan of Care Reviewed With: patient    Overall Patient Progress: improving    Legally blind. Wound vac to 125 suction covered w/ ACE wraps. Reports miminal pain, managed w/ epidural @ 7 ml/hr & scheduled tylenol. PT/OT consulted. Voiding spontaneously via urinal. Pivoted x1 to commode. Passing gas, no BM. Continue POC.

## 2024-02-04 NOTE — PROGRESS NOTES
Pain Service Progress Note  [facility]  Date: 02/04/2024       Patient Name: Alfredo Burnham  MRN: [id number]  Age: 68 year old  Sex: male      Assessment:  Alfredo Burnham is a 68 year old male with a pMHx of htn, CAD, left sided vocal cord palsy, infrarenal AAA rupture 9/2023 sp multiple repairs, left AKA 2/2 lower limb ischemia, GERD, hx of ETOH abuse, and CKD; who presents for a left AKA stump revision w/ Dr. Lowe. Left femoral catheter was placed preoperatively with ropivacaine 0.2% gtt at an initial rate of 7 mL/hr. Pain well controlled, catheter removed on 2/4/24.     Procedure: Left Stump Revision w/complex wound closure    Date of Surgery: 2/2/24    Date of Catheter Placement: 2/2/24    Plan/Recommendations:  1. Regional Anesthesia/Analgesia  -Continuous Catheter Type/Site: left femoral   Infusate: ropivacaine 0.2%  Continuous Infusion at 7 mL/hr; stopped at 7am    Catheter pulled at 10am. RN notified.     2. Anticoagulation  -Please contact Inpatient Pain Service before ordering or making any anticoagulation changes       3. Multimodal Analgesia  - Per primary team    Pain Service will sign off.    Discussed with attending anesthesiologist.        Overnight Events: NAEON    Tubes/Drains: Yes  L anterior knee bulb, negative pressure wound therapy anterior thigh    Subjective: Pain well controlled  Nausea: No  Vomiting: No  Pruritus: No  Symptoms of LAST: No        Diet: Advance Diet as Tolerated: Regular Diet Adult; Regular Diet Adult    Relevant Labs:  Recent Labs   Lab Test 01/31/24  1030 12/13/23  0617 12/12/23  0714 12/06/23  1615 12/06/23  0557   INR  --   --   --   --  1.27*   *   < >  --    < >  --    PTT  --   --  80*   < > 92*   BUN 31.8*   < >  --    < >  --     < > = values in this interval not displayed.       Physical Exam:  Vitals: BP (!) 148/80 (BP Location: Left arm, Patient Position: Supine)   Pulse 65   Temp 36.5  C (97.7  F) (Oral)   Resp  "16    1.702 m (5' 7\")   Wt 56.3 kg (124 lb 1.9 oz)   SpO2 100%   BMI 19.44 kg/m      Physical Exam:   Orientation:  Alert, oriented, and in no acute distress: Yes  Sedation: No    Motor Examination:  5/5 Strength in lower extremities: Yes    Sensory Level:   Decrease in sensation: No    Catheter Site:   Catheter entry site is clean/dry/intact: Yes    Tender: No      Relevant Medications:  Current Pain Medications:  Medications related to Pain Management (From now, onward)      Start     Dose/Rate Route Frequency Ordered Stop    02/02/24 1000  ROPivacaine 0.2% in sodium chloride 0.9% PERINEURAL infusion         7 mL/hr  Perineural Continuous Nerve Block 02/02/24 0950      02/02/24 0830  bupivacaine (MARCAINE) PF 0.25% + dexamethasone (DECADRON) PF 1 mg + dexmedetomidine (PRECEDEX) 20 mcg injection         20 mL Infiltration ONCE 02/02/24 0816      02/02/24 0816  lidocaine 1 % 0.1-1 mL         0.1-1 mL Other EVERY 1 HOUR PRN 02/02/24 0816      02/02/24 0816  lidocaine (LMX4) cream          Topical EVERY 1 HOUR PRN 02/02/24 0816      02/02/24 0816  fentaNYL (PF) (SUBLIMAZE) injection 25-50 mcg         25-50 mcg Intravenous EVERY 2 MIN PRN 02/02/24 0816      02/02/24 0816  midazolam (VERSED) injection 1-2 mg         1-2 mg Intravenous EVERY 4 MIN PRN 02/02/24 0816                  Acute Inpatient Pain Service [facility]  Hours of pain coverage 24/7   Page via [employer]- Please Page the Pain Team Via [employer]: \"PAIN MANAGEMENT ACUTE INPATIENT/ [facility] [place]\"       Leslie Goldberg, MD   PGY-4, Anesthesiology  [phone]        "

## 2024-02-05 ENCOUNTER — APPOINTMENT (OUTPATIENT)
Dept: PHYSICAL THERAPY | Facility: CLINIC | Age: 69
DRG: 465 | End: 2024-02-05
Attending: SURGERY
Payer: COMMERCIAL

## 2024-02-05 VITALS
OXYGEN SATURATION: 100 % | TEMPERATURE: 97.8 F | DIASTOLIC BLOOD PRESSURE: 87 MMHG | RESPIRATION RATE: 18 BRPM | SYSTOLIC BLOOD PRESSURE: 148 MMHG | HEIGHT: 67 IN | HEART RATE: 73 BPM | WEIGHT: 124.12 LBS | BODY MASS INDEX: 19.48 KG/M2

## 2024-02-05 LAB
ANION GAP SERPL CALCULATED.3IONS-SCNC: 2 MMOL/L (ref 7–15)
BUN SERPL-MCNC: 36 MG/DL (ref 8–23)
CALCIUM SERPL-MCNC: 9.1 MG/DL (ref 8.8–10.2)
CHLORIDE SERPL-SCNC: 116 MMOL/L (ref 98–107)
CREAT SERPL-MCNC: 1.75 MG/DL (ref 0.67–1.17)
DEPRECATED HCO3 PLAS-SCNC: 20 MMOL/L (ref 22–29)
EGFRCR SERPLBLD CKD-EPI 2021: 41 ML/MIN/1.73M2
ERYTHROCYTE [DISTWIDTH] IN BLOOD BY AUTOMATED COUNT: 14.7 % (ref 10–15)
GLUCOSE SERPL-MCNC: 101 MG/DL (ref 70–99)
HCT VFR BLD AUTO: 29.5 % (ref 40–53)
HGB BLD-MCNC: 9.5 G/DL (ref 13.3–17.7)
MCH RBC QN AUTO: 32.5 PG (ref 26.5–33)
MCHC RBC AUTO-ENTMCNC: 32.2 G/DL (ref 31.5–36.5)
MCV RBC AUTO: 101 FL (ref 78–100)
PLATELET # BLD AUTO: 96 10E3/UL (ref 150–450)
POTASSIUM SERPL-SCNC: 4.3 MMOL/L (ref 3.4–5.3)
RBC # BLD AUTO: 2.92 10E6/UL (ref 4.4–5.9)
SODIUM SERPL-SCNC: 138 MMOL/L (ref 135–145)
WBC # BLD AUTO: 6.9 10E3/UL (ref 4–11)

## 2024-02-05 PROCEDURE — 258N000003 HC RX IP 258 OP 636

## 2024-02-05 PROCEDURE — 97530 THERAPEUTIC ACTIVITIES: CPT | Mod: GP | Performed by: REHABILITATION PRACTITIONER

## 2024-02-05 PROCEDURE — 85027 COMPLETE CBC AUTOMATED: CPT | Performed by: STUDENT IN AN ORGANIZED HEALTH CARE EDUCATION/TRAINING PROGRAM

## 2024-02-05 PROCEDURE — 80048 BASIC METABOLIC PNL TOTAL CA: CPT | Performed by: STUDENT IN AN ORGANIZED HEALTH CARE EDUCATION/TRAINING PROGRAM

## 2024-02-05 PROCEDURE — 99024 POSTOP FOLLOW-UP VISIT: CPT | Performed by: NURSE PRACTITIONER

## 2024-02-05 PROCEDURE — 250N000011 HC RX IP 250 OP 636: Mod: JZ

## 2024-02-05 PROCEDURE — 36415 COLL VENOUS BLD VENIPUNCTURE: CPT | Performed by: STUDENT IN AN ORGANIZED HEALTH CARE EDUCATION/TRAINING PROGRAM

## 2024-02-05 PROCEDURE — 250N000013 HC RX MED GY IP 250 OP 250 PS 637: Performed by: STUDENT IN AN ORGANIZED HEALTH CARE EDUCATION/TRAINING PROGRAM

## 2024-02-05 RX ORDER — OXYCODONE HYDROCHLORIDE 5 MG/1
5 TABLET ORAL EVERY 6 HOURS PRN
Qty: 20 TABLET | Refills: 0 | Status: SHIPPED | OUTPATIENT
Start: 2024-02-05 | End: 2024-02-10

## 2024-02-05 RX ADMIN — Medication 50 MCG: at 08:15

## 2024-02-05 RX ADMIN — ACETAMINOPHEN 650 MG: 325 TABLET, FILM COATED ORAL at 13:11

## 2024-02-05 RX ADMIN — DAPTOMYCIN 700 MG: 500 INJECTION, POWDER, LYOPHILIZED, FOR SOLUTION INTRAVENOUS at 11:30

## 2024-02-05 RX ADMIN — AMLODIPINE BESYLATE 10 MG: 10 TABLET ORAL at 08:15

## 2024-02-05 RX ADMIN — METOPROLOL TARTRATE 100 MG: 50 TABLET, FILM COATED ORAL at 08:15

## 2024-02-05 RX ADMIN — ACETAMINOPHEN 975 MG: 325 TABLET, FILM COATED ORAL at 06:41

## 2024-02-05 RX ADMIN — PANTOPRAZOLE SODIUM 40 MG: 40 TABLET, DELAYED RELEASE ORAL at 06:41

## 2024-02-05 RX ADMIN — APIXABAN 5 MG: 5 TABLET, FILM COATED ORAL at 08:15

## 2024-02-05 ASSESSMENT — ACTIVITIES OF DAILY LIVING (ADL)
ADLS_ACUITY_SCORE: 39
ADLS_ACUITY_SCORE: 42
ADLS_ACUITY_SCORE: 42
ADLS_ACUITY_SCORE: 39
ADLS_ACUITY_SCORE: 42
ADLS_ACUITY_SCORE: 39

## 2024-02-05 NOTE — PLAN OF CARE
Patient discharged home following hospital stay for:    Vital signs stable  Oxygen status: stable on RA  Patient tolerating regular diet     Belongings sent with patient. IV site discontinued. Discharge meds to discharge pharmacy. AVS and education gone over with patient. Pt to follow up as outpatient and with PCP. Pt verbalized understanding of all education and information.

## 2024-02-05 NOTE — PROGRESS NOTES
"Vascular Surgery Progress Note    NAEON. Patient is comfortable, not requiring any pain medications.     BP (!) 148/87 (BP Location: Left arm)   Pulse 73   Temp 97.8  F (36.6  C) (Oral)   Resp 18   Ht 1.702 m (5' 7\")   Wt 56.3 kg (124 lb 1.9 oz)   SpO2 100%   BMI 19.44 kg/m      Resting in bed  NAD  L AKA stump with wrap intact, soft, appropriately tender, no hematoma  Scant output in drain and vac    CBC RESULTS: Recent Labs   Lab Test 02/05/24  0641   WBC 6.9   RBC 2.92*   HGB 9.5*   HCT 29.5*   *   MCH 32.5   MCHC 32.2   RDW 14.7   PLT 96*     Last Comprehensive Metabolic Panel:  Lab Results   Component Value Date     02/05/2024    POTASSIUM 4.3 02/05/2024    CHLORIDE 116 (H) 02/05/2024    CO2 20 (L) 02/05/2024    ANIONGAP 2 (L) 02/05/2024     (H) 02/05/2024    BUN 36.0 (H) 02/05/2024    CR 1.75 (H) 02/05/2024    GFRESTIMATED 41 (L) 02/05/2024    OMAR 9.1 02/05/2024       A/P: POD #3 s/p Left AKA revision. Recovering well.    - Will remove PICC after completion of final daptomycin dose  - resume PTA Eliquis for PEs  - regular diet  - Will exchange VAC for prevena  - Will schedule for follow-up in 1 week    Seen and d/w staff, Dr. Lowe.    Ted Maguire MD    Addendum:   Prevena placed prior to discharge to Left AKA. Left AKA incision is closed, no depth or width is present. No drainage is present. Incision length 17cm. Prevena 20cm length applied prior to discharge.    Miryam Carrasco CNP vascular surgery    "

## 2024-02-05 NOTE — PROGRESS NOTES
Care Management Discharge Note    Discharge Date: 02/05/2024       Discharge Disposition: Home, Home Care, Outpatient Rehab (PT, OT, SLP, Cardiac or Pulmonary)    Discharge Services: None    Discharge DME: Wheelchair, Walker    Discharge Transportation: family or friend will provide    Private pay costs discussed: Not applicable    Does the patient's insurance plan have a 3 day qualifying hospital stay waiver?  Yes     Which insurance plan 3 day waiver is available? Alternative insurance waiver    Will the waiver be used for post-acute placement? Undetermined at this time    PAS Confirmation Code:    Patient/family educated on Medicare website which has current facility and service quality ratings:      Education Provided on the Discharge Plan:  yes  Persons Notified of Discharge Plans: Provider, Patient  Patient/Family in Agreement with the Plan:  Yes    Handoff Referral Completed: Yes EHO    Additional Information:  RNCC contacted [facility] and spoke with Jerry confirming patient will have service within 48 hours of discharge today.  Patient will need to be seen for SN,PT/OT.  RNCC contacted I to let them know patient will no longer need FHI service at discharge.  RNCC met with patient confirming needs for home care.  Patient reports wife will be transporting him home at discharge.  Patient states no other needs for care coordination to follow up on.     Home Care for RN/PT/OT services  [facility]   P: [phone]  F: [phone]        Ana Fernando RN    Nurse Coordinator     Covering for: Naima ROGER    Phone: *[phone]    Social Work and Care Management Department    SEARCHABLE in [facility] - search CARE COORDINATOR    [place] [facility] [place] ([phone]) Saturday & Sunday; ([phone]) [facility] Recognized Holidays    Units: 5A, 5B & 5C  Pager: [phone]    Units: 6B, 6C & 6D    Pager: [phone]    Units: 7A, 7B, 7C & 7D    Pager: [phone]    Units: 6A & ICU   Pager: [phone]    Units: 5 Ortho, 5MS & WB ED  Pager: [phone]    Units: 6MS, 8A & 10 ICU  Pager [phone]

## 2024-02-05 NOTE — PROGRESS NOTES
02/05/24 0900   Appointment Info   Signing Clinician's Name / Credentials (PT) julio Earlnatalie   PT Assistant Visit Number 1   Therapeutic Activity   Therapeutic Activities: dynamic activities to improve functional performance Minutes (09596) 24   Symptoms Noted During/After Treatment Fatigue   Treatment Detail/Skilled Intervention PT: pt able to demo supien and seated TE program. pt ed progress slower with longer holds as able to cont to progress functional strength. pt able to demo all bed mob, with SBA. squat pivot and stand pivot with close SBA with WW. pt as own W/c and WW at home. pt declined amb with PT today, but ed pt to cont to progress all functional mob as able. pt stating was home for anout 30day after last surgey and everything went well. stating no concerns for going home.   PT Discharge Planning   PT Plan PT: see D/c note   PT Discharge Recommendation (DC Rec) home with home care physical therapy   PT Rationale for DC Rec Pt is mobilizing near functional mobility   PT Brief overview of current status CGA-SBA SPT   Total Session Time   Timed Code Treatment Minutes 24   Total Session Time (sum of timed and untimed services) 24     PT: pt met 3/4 goals at time of discharge.  Unmet amb goal due to pt declined to amb with PT today  Pt to resume home PT.   No equipment needs.     Physical Therapy Discharge Summary    Reason for therapy discharge:    Discharged to home with home therapy.    Progress towards therapy goal(s). See goals on Care Plan in [facility] electronic health record for goal details.  Goals partially met.  Barriers to achieving goals:   discharge from facility.    Therapy recommendation(s):    Continued therapy is recommended.  Rationale/Recommendations:  to improve functional strength and safety with IND mobility.

## 2024-02-05 NOTE — DISCHARGE INSTRUCTIONS
Discharge Instructions    Diet  - You may return to your regular diet. We always encourage taking fiber as well as staying well-hydrated.   - Be sure to drink plenty of fluids.     Activity: Per PT recommendations but in addition:  - No lifting, pushing, or pulling greater than 10 pounds and no strenuous exercise for 4-6 weeks.   - No driving while on narcotic pain medications (such as oxycodone)  - No driving until you are able to fully twist to both sides quickly and without any pain    Wound Care  - Inspect your wounds daily for signs of infection (increased redness, drainage, pain)  - Keep your wound clean and dry, inspect it daily for the above signs.  You will have the Prevena dressing on your left leg. This is to be changed by vascular surgery team only.   - You may shower 24 hours after your surgery (cover the Prevena dressing well with plastic to avoid water/moisture)    Call Vascular surgery [facility] for:  - Temperature > 101F, chills, rigors, dizziness  - Redness around or purulent drainage from wound  - Inability to tolerate diet, nausea or vomiting  - You stop passing gas, develop significant bloating, abdominal pain  - Have blood in stools/vomit  - Have severe diarrhea/constipation  - Any other questions or concerns.    Medication Instructions  Some of your medications may have changed. Please take only prescribed and resumed medications.     We recommend you take Tylenol around the clock for baseline pain control (this was prescribed for you). Then take narcotic pain medication only as needed if you were prescribed any. Once you are no longer needing narcotics, you may take the Tylenol as needed. Do not take more than 4,000 mg of acetaminophen (Tylenol) from all sources to reduce the risk of liver damage.     Follow up:    PCP:  Future Appointments 2/5/2024 - 8/3/2024     You will be seen by SANDY in clinic next week for wound check and Prevena check.       Date Visit Type Length Department Provider      2/15/2024 12:00 PM RETURN INFECTIOUS DISEASE 30 min  INFECTIOUS   DISEASE Tanika Tellez MD    Location Instructions:     Located in the [facility] [place] at [address] [place] [address]. For parking options, enter the [facility] /arrival   plaza from [facility] and attendants can assist you based on your   needs.  parking is available for those with limited mobility M-F from   7 a.m. to 5 p.m. Due to short staffing, we are unable to offer  to   all patients/visitors. Visit [email or web address] for more details.    Self-parking:&nbsp;  West Lot: Located across from the main entrance, this   is a convenient option for patients. Enter on [address]. Parking   attendants available most hours to assist.&nbsp;     [address] Ramp: Enter at the [facility] SE entrance (one block north   of the [facility] main entrance). Do not enter the ramp from [facility] - this   entrance is not staffed and is further from the [facility] main entrance.              2/19/2024 12:00 PM POST-OP 30 min [facility] VASCULAR SURGERY Miryam Carrasco APRN CNP    Location Instructions:     Located in the [facility] at [address] [place] [address]. For parking options, enter the [facility] /arrival   plaza from [facility] and attendants can assist you based on your   needs.  parking is available for those with limited mobility M-F from   7 a.m. to 5 p.m. Due to short staffing, we are unable to offer  to   all patients/visitors. Visit [email or web address] for more details.    Self-parking:&nbsp;  West Lot: Located across from the main entrance, this   is a convenient option for patients. Enter on [address]. Parking   attendants available most hours to assist.&nbsp;     [address] Ramp: Enter at the [address] entrance (one block north   of the [facility] main entrance). Do not enter the ramp from [address] - this   entrance is not staffed and is further from the [facility] main entrance.               3/13/2024  1:30 PM POST-OP 30 min [facility] VASCULAR SURGERY Boris Lowe MD    Location Instructions:     Located in the Clinics and Surgery Center at [address] [place] [address]. For parking options, enter the [facility] /arrival   plaza from [facility] and attendants can assist you based on your   needs.  parking is available for those with limited mobility M-F from   7 a.m. to 5 p.m. Due to short staffing, we are unable to offer  to   all patients/visitors. Visit [email or web address] for more details.    Self-parking:&nbsp;  West Lot: Located across from the main entrance, this   is a convenient option for patients. Enter on [facility]. Parking   attendants available most hours to assist.&nbsp;     [facility] Ramp: Enter at the [facility] entrance (one block north   of the [facility] main entrance). Do not enter the ramp from [facility] - this   entrance is not staffed and is further from the [facility] main entrance.              With general vascular surgery questions, concerns, or to request/change an appointment, please call:      Vascular Call Center  [phone]    To contact someone after 5 pm, on a weekend, or on a Holiday, please call:  [facility]  [phone], option 4 to have the Attending Physician for Vascular Surgery Service paged.

## 2024-02-05 NOTE — DISCHARGE SUMMARY
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 Removal of 1 out of 3 antibiotic beads as the other two were entirely covered by healthy granulated tissue. Complex two layer wound closure     POSTOPERATIVE COMPLICATIONS: None     DATE OF DISCHARGE: 24    HOSPITAL COURSE: Alfredo Burnham is a 68 year old male who on 2024 underwent the above-named procedures. He tolerated the procedure well and postoperatively was transferred to the general post-surgical unit. The remainder of his course was essentiallly uncomplicated. Prior to discharge, his pain was controlled well with tylenol and oxycodone. He was able to perform ADLs and ambulate independently without difficulty, and had full return of bowel and bladder function. On 24, he was discharged to home in stable condition.    Physical Exam:  Constitutional: healthy, alert, no acute distress and cooperative   Cardiovascular: pulses regular  Respiratory: breathing unlabored without secondary muscle use  Psychiatric: mentation appears normal and affect normal/bright  Neck: no asymmetry  GI/Abdomen: abdomen soft, non-tender. No palpable masses  MSK: able to move all extremities without weakness or ataxia  Extremities: no open lesions, extremities warm and well perfused  Skin: prevena  applied to Left AKA, incision closed, without purulent drainage  Pulses: symmetric and palpable radial pulses. Palpable DP and PT on RLE    DISCHARGE INSTRUCTIONS:    Diet  - You may return to your regular diet. We always encourage taking fiber as well as staying well-hydrated.   - Be sure to drink plenty of fluids.     Activity: Per PT recommendations but in addition:  - No lifting, pushing, or pulling greater than 10 pounds and no strenuous exercise for 4-6 weeks.   - No driving while on narcotic pain medications (such as oxycodone)  - No driving until you are able to fully twist to both sides quickly and without any pain    Wound Care  - Inspect your wounds daily for signs of infection (increased redness, drainage, pain)  - Keep your wound clean and dry, inspect it daily for the above signs.  You will have the Prevena dressing on your left leg. This is to be changed by vascular surgery team only.   - You may shower 24 hours after your surgery (cover the Prevena dressing well with plastic to avoid water/moisture)    Call Vascular surgery [facility] for:  - Temperature > 101F, chills, rigors, dizziness  - Redness around or purulent drainage from wound  - Inability to tolerate diet, nausea or vomiting  - You stop passing gas, develop significant bloating, abdominal pain  - Have blood in stools/vomit  - Have severe diarrhea/constipation  - Any other questions or concerns.    Medication Instructions  Some of your medications may have changed. Please take only prescribed and resumed medications.     We recommend you take Tylenol around the clock for baseline pain control (this was prescribed for you). Then take narcotic pain medication only as needed if you were prescribed any. Once you are no longer needing narcotics, you may take the Tylenol as needed. Do not take more than 4,000 mg of acetaminophen (Tylenol) from all sources to reduce the risk of liver damage.     Follow up:    PCP:  Future Appointments 2/5/2024 -  8/3/2024     You will be seen by SANDY in clinic next week (2/12 or 2/13) for wound check and Prevena check.    Future Appointments 2/5/2024 - 8/3/2024        Date Visit Type Length Department Provider     2/12/2024 12:30 PM RETURN VASCULAR PATIENT 30 min [facility] V[facility]ULAR SURGERY Miryam Carrasco APRN CNP    Location Instructions:     Located in the [facility] at [address] [place] [address]. For parking options, enter the [facility] /arrival plaza from [facility] and attendants can assist you based on your needs.  parking is available for those with limited mobility M-F from 7 a.m. to 5 p.m. Due to short staffing, we are unable to offer  to all patients/visitors. Visit [email or web address] for more details.  Self-parking:&nbsp;  West Lot: Located across from the main entrance, this is a convenient option for patients. Enter on [address]. Parking attendants available most hours to assist.&nbsp;     [address] Ramp: Enter at the [facility] entrance (one block north of the [facility] main entrance). Do not enter the ramp from [facility] - this entrance is not staffed and is further from the [facility] main entrance.              2/15/2024 12:00 PM RETURN INFECTIOUS DISEASE 30 min  INFECTIOUS DISEASE Tanika Tellez MD    Location Instructions:     Located in the [facility] [place] at [address] [place] [address]. For parking options, enter the [facility] /arrival plaza from [facility] and attendants can assist you based on your needs.  parking is available for those with limited mobility M-F from 7 a.m. to 5 p.m. Due to short staffing, we are unable to offer  to all patients/visitors. Visit [email or web address] for more details.  Self-parking:&nbsp;  West Lot: Located across from the main entrance, this is a convenient option for patients. Enter on [address]. Parking attendants available most hours to assist.&nbsp;     [employer] Ramp: Enter at the [address] entrance (one block north of the [facility] main entrance). Do not enter the ramp from [facility] - this entrance is not staffed and is further from the [facility] main entrance.              2/19/2024 12:00 PM POST-OP 30 min [facility] [place] Miryam Carrasco APRN CNP    Location Instructions:     Located in the [facility] at [address] [place] [address]. For parking options, enter the [facility] /arrival plaza from [facility] and attendants can assist you based on your needs.  parking is available for those with limited mobility M-F from 7 a.m. to 5 p.m. Due to short staffing, we are unable to offer  to all patients/visitors. Visit [email or web address] for more details.  Self-parking:&nbsp;  West Lot: Located across from the main entrance, this is a convenient option for patients. Enter on [address]. Parking attendants available most hours to assist.&nbsp;     [facility] Ramp: Enter at the [facility] entrance (one block north of the [facility] main entrance). Do not enter the ramp from [facility] - this entrance is not staffed and is further from the [facility] main entrance.              3/13/2024  1:30 PM POST-OP 30 min [facility] [place] Boris Lowe MD    Location Instructions:     Located in the [facility] at [address] [place] [address]. For parking options, enter the [facility] /arrival plaza from [facility] and attendants can assist you based on your needs.  parking is available for those with limited mobility M-F from 7 a.m. to 5 p.m. Due to short staffing, we are unable to offer  to all patients/visitors. Visit [email or web address] for more details.  Self-parking:&nbsp;  West Lot: Located across from the main entrance, this is a convenient option for patients. Enter on [address]. Parking attendants available most hours to assist.&nbsp;     [address] Ramp: Enter at the [facility] SE entrance (one block north of the [facility] main  entrance). Do not enter the ramp from [facility] - this entrance is not staffed and is further from the [facility] main entrance.                           With general vascular surgery questions, concerns, or to request/change an appointment, please call:      Vascular Call Center  [phone]    To contact someone after 5 pm, on a weekend, or on a Holiday, please call:  [facility]  [phone], option 4 to have the Attending Physician for Vascular Surgery Service paged.        DISCHARGE MEDICATIONS:     Review of your medicines        UNREVIEWED medicines. Ask your doctor about these medicines        Dose / Directions   * QUEtiapine 25 MG tablet  Commonly known as: SEROquel  Indication: Agitation  Used for: Infection following a procedure, deep incisional surgical site, initial encounter, Critical limb ischemia of left lower extremity (H), Infrarenal abdominal aortic aneurysm (AAA) without rupture (H24), S/P above knee amputation, left (H), Insomnia, unspecified type, Constipation, unspecified constipation type, Primary hypertension, Acute kidney failure with tubular necrosis (H), Infrarenal abdominal aortic aneurysm, ruptured (H), Hypertension, unspecified type      Dose: 75 mg  Take 3 tablets (75 mg) by mouth at bedtime for 360 days  Quantity: 270 tablet  Refills: 3     * QUEtiapine 25 MG tablet  Commonly known as: SEROquel  Used for: Infection following a procedure, deep incisional surgical site, initial encounter, Critical limb ischemia of left lower extremity (H), Infrarenal abdominal aortic aneurysm (AAA) without rupture (H24), S/P above knee amputation, left (H), Insomnia, unspecified type, Constipation, unspecified constipation type, Primary hypertension, Acute kidney failure with tubular necrosis (H), Infrarenal abdominal aortic aneurysm, ruptured (H), Hypertension, unspecified type      Dose: 25 mg  Take 1 tablet (25 mg) by mouth 2 times daily as needed For anxiety  Quantity: 180  tablet  Refills: 3           * This list has 2 medication(s) that are the same as other medications prescribed for you. Read the directions carefully, and ask your doctor or other care provider to review them with you.                START taking        Dose / Directions   oxyCODONE 5 MG tablet  Commonly known as: ROXICODONE      Dose: 5 mg  Take 1 tablet (5 mg) by mouth every 6 hours as needed for severe pain  Quantity: 20 tablet  Refills: 0            CONTINUE these medicines which have NOT CHANGED        Dose / Directions   amLODIPine 10 MG tablet  Commonly known as: NORVASC  Indication: High Blood Pressure Disorder  Used for: Infection following a procedure, deep incisional surgical site, initial encounter, Critical limb ischemia of left lower extremity (H), Infrarenal abdominal aortic aneurysm (AAA) without rupture (H24), S/P above knee amputation, left (H), Insomnia, unspecified type, Constipation, unspecified constipation type, Primary hypertension, Acute kidney failure with tubular necrosis (H), Infrarenal abdominal aortic aneurysm, ruptured (H), Hypertension, unspecified type      Dose: 10 mg  Take 1 tablet (10 mg) by mouth daily for 360 days Hold for SBP<105  Quantity: 90 tablet  Refills: 3     apixaban ANTICOAGULANT 5 MG tablet  Commonly known as: ELIQUIS  Used for: Infection following a procedure, deep incisional surgical site, initial encounter, Critical limb ischemia of left lower extremity (H), Infrarenal abdominal aortic aneurysm (AAA) without rupture (H24), S/P above knee amputation, left (H), Insomnia, unspecified type, Constipation, unspecified constipation type, Primary hypertension, Acute kidney failure with tubular necrosis (H), Infrarenal abdominal aortic aneurysm, ruptured (H), Hypertension, unspecified type      Dose: 5 mg  Take 1 tablet (5 mg) by mouth 2 times daily for 90 days  Quantity: 180 tablet  Refills: 0     atorvastatin 10 MG tablet  Commonly known as: LIPITOR  Used for: Infrarenal  abdominal aortic aneurysm, ruptured (H), Hypertension, unspecified type, Critical limb ischemia of left lower extremity (H), Infrarenal abdominal aortic aneurysm (AAA) without rupture (H24), Acute kidney failure with tubular necrosis (H)      Dose: 10 mg  Take 1 tablet (10 mg) by mouth every evening  Refills: 0     camphor-menthol 0.5-0.5 % external lotion  Commonly known as: DERMASARRA      APPLY THIN LAYER TOPICALLY FOUR TIMES EVERY DAY AS NEEDED FOR ITCHING  Refills: 0     docusate sodium 50 MG capsule  Commonly known as: COLACE      Dose: 50 mg  Take 50 mg by mouth 2 times daily  Refills: 0     melatonin 3 MG tablet  Indication: Trouble Sleeping  Used for: Insomnia, unspecified type      Dose: 3 mg  Take 1 tablet (3 mg) by mouth every evening  Refills: 0     metoprolol tartrate 100 MG tablet  Commonly known as: LOPRESSOR  Indication: High Blood Pressure Disorder  Used for: Infection following a procedure, deep incisional surgical site, initial encounter, Critical limb ischemia of left lower extremity (H), Infrarenal abdominal aortic aneurysm (AAA) without rupture (H24), S/P above knee amputation, left (H), Insomnia, unspecified type, Constipation, unspecified constipation type, Primary hypertension, Acute kidney failure with tubular necrosis (H), Infrarenal abdominal aortic aneurysm, ruptured (H), Hypertension, unspecified type      Dose: 100 mg  Take 1 tablet (100 mg) by mouth 2 times daily for 360 days Hold for SBP<100  Quantity: 180 tablet  Refills: 3     pantoprazole 40 MG EC tablet  Commonly known as: PROTONIX  Indication: Heartburn  Used for: Infection following a procedure, deep incisional surgical site, initial encounter, Critical limb ischemia of left lower extremity (H), Infrarenal abdominal aortic aneurysm (AAA) without rupture (H24), S/P above knee amputation, left (H), Insomnia, unspecified type, Constipation, unspecified constipation type, Primary hypertension, Acute kidney failure with tubular  necrosis (H), Infrarenal abdominal aortic aneurysm, ruptured (H), Hypertension, unspecified type      Dose: 40 mg  Take 1 tablet (40 mg) by mouth every 24 hours for 360 days  Quantity: 90 tablet  Refills: 3     UNABLE TO FIND      MEDICATION NAME: [facility] - patient and spouse unsure of name  Refills: 0     Vitamin D3 25 mcg (1000 units) tablet  Commonly known as: CHOLECALCIFEROL  Used for: Infection following a procedure, deep incisional surgical site, initial encounter, Critical limb ischemia of left lower extremity (H), Infrarenal abdominal aortic aneurysm (AAA) without rupture (H24), S/P above knee amputation, left (H), Insomnia, unspecified type, Constipation, unspecified constipation type, Primary hypertension, Acute kidney failure with tubular necrosis (H), Infrarenal abdominal aortic aneurysm, ruptured (H), Hypertension, unspecified type      Dose: 50 mcg  Take 2 tablets (50 mcg) by mouth every 24 hours for 360 days  Quantity: 180 tablet  Refills: 3            STOP taking      amLODIPine-benazepril 5-20 MG capsule  Commonly known as: LOTREL        polyethylene glycol 17 g packet  Commonly known as: MIRALAX        psyllium 28.3 % packet  Commonly known as: METAMUCIL        sennosides 8.6 MG tablet  Commonly known as: SENOKOT                  Where to get your medicines        These medications were sent to [facility] [address] [facility]  [address] [facility] [address]      Phone: [phone]   oxyCODONE 5 MG tablet         Miryam Bacu, CNP  Vascular Surgery  Pager: [phone]  [email or web address]  Send message or 10 digit call back number Securely via [employer] with the [employer] Web Console (learn more here)'

## 2024-02-05 NOTE — PLAN OF CARE
Goal Outcome Evaluation:      Plan of Care Reviewed With: patient    Overall Patient Progress: improving    Shift 1930-0730  A&Ox4, cooperative. VSS on RA. L AKA is slightly tender, denies numbness and tingling, wound vac and FARIDA drain in place, no output. Pt appears to be resting comfortably in bed, rates pain 1/10, pt slept well overnight, scheduled tylenol x1 this morning. Tolerating regular diet, no nausea. Voiding adequate amount in the urinal. +BS, +flatus, LBM on 2/4. No acute changes this shift. Plan continue daptomycin until discharge possible today, then will remove PICC. Continue POC.

## 2024-02-06 ENCOUNTER — PATIENT OUTREACH (OUTPATIENT)
Dept: CARE COORDINATION | Facility: CLINIC | Age: 69
End: 2024-02-06
Payer: COMMERCIAL

## 2024-02-06 NOTE — PROGRESS NOTES
[facility]: [facility]: Post-Discharge Note  SITUATION                                                      Admission:    Admission Date: 02/02/24   Reason for Admission: Left above-knee amputation stump revision with delayed  primary closure  Discharge:   Discharge Date: 02/05/24  Discharge Diagnosis: Left above-knee amputation stump revision with delayed  primary closure    BACKGROUND                                                      Per hospital discharge summary and inpatient provider notes:    67 yo M with recent prolonged hospitalization for AAA s/p open repair c/b shock, colonic and LLE ischemia s/p AKA complicated by VRE osteomyelitis of stump, daptomycin DSS (EVELIO 3), S- linezolid, tigecycline. Last debridement 12/15/23 with gent beads placed, wound vac in place. Then pathology did not show osteomyelitis, intra op cultures remained negative. Patient underwent incisional closure with vascular sugery 2/2/24. Everything appeared healthy and no new cultures were obtained. Two gentamicin beads were left in place due to coverage with granulation tissue and there is no plan to remove them.      Mr. Burnham has currently completed 7 weeks of daptomycin therapy and his wound looked healthy today at the time of wound closure. His PICC line insertion site is very irritated and I'm concerned it could become an infection risk itself if we leave it in place much longer. We will plan to continue daptomycin while inpatient and then discontinue on discharge. PICC can be removed at any time. We discussed transitioning to oral therapy with linezolid or omadacycline, but since he will have had 7-8 weeks of therapy by the time he is discharged and everything looked healthy during surgery, will plan to stop antibiotics and monitor.      I am a little concerned that the cement beads will stay in place as foreign bodies. Since they were antibiotic impregnated and placed during 12/15 surgery when  cultures were negative, they hopefully should not have been colonized with VRE. However, if his infection recurs he would need lifelong suppression as long as the beads stay in place.     ASSESSMENT           Discharge Assessment  How are you doing now that you are home?: He is having more pain then we expected but otherwise he is doing well.  How are your symptoms? (Red Flag symptoms escalate to triage hotline per guidelines): Improved  Do you feel your condition is stable enough to be safe at home until your provider visit?: Yes  Does the patient have their discharge instructions? : Yes  Does the patient have questions regarding their discharge instructions? : No  Were you started on any new medications or were there changes to any of your previous medications? : Yes  Does the patient have all of their medications?: Yes  Do you have questions regarding any of your medications? : No  Discharge follow-up appointment scheduled within 14 calendar days? : Yes  Discharge Follow Up Appointment Date: 02/15/24  Discharge Follow Up Appointment Scheduled with?: Specialty Care Provider    Post-op (W CTA Only)  If the patient had a surgery or procedure, do they have any questions for a nurse?: No             PLAN                                                      Outpatient Plan: 2/15/2024 12:00 PM RETURN INFECTIOUS DISEASE 30 min   INFECTIOUS  DISEASE Tanika Tellez MD    Future Appointments   Date Time Provider Department Center   2/12/2024 12:30 PM Miryam Carrasco APRN [facility]   2/15/2024 12:00 PM Tanika Tellez MD [facility]   2/19/2024 12:00 PM Miryam Carrasco APRN [facility]   3/13/2024  1:30 PM Boris Lowe MD [facility]         For any urgent concerns, please contact our 24 hour nurse triage line: [phone] ([address] [place] [address])         CHE Houser  [phone]  [facility]

## 2024-02-06 NOTE — TELEPHONE ENCOUNTER
Per Dr. White all parties informed ok to skip 2/2 dose.   Yes it okay to skip dose 2/2.  Marianna Fu RN  Infectious Disease on 2/6/2024 at 12:05 PM

## 2024-02-08 ASSESSMENT — LIFESTYLE VARIABLES: TOBACCO_USE: 1

## 2024-02-08 NOTE — ANESTHESIA PREPROCEDURE EVALUATION
Anesthesia Pre-Procedure Evaluation    Patient: Alfredo Burnham   MRN: [id number] : 1955        Procedure : Procedure(s):  REVISION, AMPUTATION STUMP, LEFT LOWER EXTREMITY          Past Medical History:   Diagnosis Date    Hypertension 2011    Macular degeneration       Past Surgical History:   Procedure Laterality Date    AMPUTATE LEG ABOVE KNEE Left 10/17/2023    Procedure: AMPUTATION, ABOVE KNEE and Abdominal wound vac exchange;  Surgeon: Ash Boucher MBBS;  [facility]    AMPUTATE REVISION STUMP LOWER EXTREMITY Left 2023    Procedure: REVISION, AMPUTATION STUMP, LEFT LOWER EXTREMITY;  Surgeon: Boris Lowe;  [facility]    AMPUTATE REVISION STUMP LOWER EXTREMITY Left 12/15/2023    Procedure: IRRIGATION AND DEBRIDEMENT OF LEFT AMPUTATION STUMP, WITH CEMENT ANTIBIOTIC BEAD INSERTION X3;  Surgeon: Boris Lowe;  [facility]    AMPUTATE REVISION STUMP LOWER EXTREMITY Left 2024    Procedure: Left Stump Revision with complex wound closure;  Surgeon: Boris Lowe MD;  [facility]    CLOSE SECONDARY WOUND ABDOMEN N/A 10/20/2023    Procedure: Delayed primary subcutaneous abdominal closure;  Surgeon: Boris Lowe;  [facility]    INCISION AND DRAINAGE ABDOMEN WASHOUT, COMBINED N/A 2023    Procedure: abdominal washout, combined, repacking,  abthera placement;  Surgeon: Ash Boucher MBBS;  [facility]    INCISION AND DRAINAGE ABDOMEN WASHOUT, COMBINED N/A 2023    Procedure: Abdominal washout, Retroperitoneal closure, washout left lower extremity wound;  Surgeon: Boris Lowe;  [facility]    INCISION AND DRAINAGE LOWER EXTREMITY, COMBINED Left 2023    Procedure: irrigation and drainage of collection left above knee amputation stump;  Surgeon: Ash Boucher MBBS;  [facility]    IR CVC NON TUNNEL LINE REMOVAL  10/18/2023    IR CVC TUNNEL PLACEMENT > 5 YRS OF AGE  10/18/2023    IR CVC TUNNEL REMOVAL  RIGHT  2023    IR OR ANGIOGRAM  2023    IRRIGATION AND DEBRIDEMENT ABDOMEN WASHOUT, COMBINED N/A 2023    Procedure: exploration of  ABDOMINAL CAVITY, placement of abthera;  Surgeon: Boris Lowe;  [facility]    IRRIGATION AND DEBRIDEMENT ABDOMEN WASHOUT, COMBINED N/A 10/02/2023    Procedure: Exploratory laparotomy, abominal closure, wound vac change left lower extremity;  Surgeon: Jonathan Fry MD;  [facility]    IRRIGATION AND DEBRIDEMENT LOWER EXTREMITY, COMBINED Left 2023    Procedure: exploration of left lower extremity, partial closure of left lower extremity, wound vac placement;  Surgeon: Boris Lowe;  [facility]    LAPAROTOMY EXPLORATORY N/A 2023    Procedure: Laparotomy exploratory;  Surgeon: Roger Shirley MD;  [facility]    PICC INSERTION - DOUBLE LUMEN Right 2023    43-1cm, Basilic vein    REPAIR ANEURYSM ABDOMINAL AORTA N/A 2023    Procedure: Resection of Ruptureed Abdominal Aneurysm, Aortic biliary bypass with 20 x 10 mm Bifurcated hemagard graft, Temporary Abdominal Closure, Endovascular Balloon Inclusion of Aorta;  Surgeon: Boris Lowe;  [facility]    SIGMOIDOSCOPY FLEXIBLE N/A 2023    Procedure: Sigmoidoscopy flexible;  Surgeon: Gabino Walker MD;  [facility]    THROMBECTOMY LOWER EXTREMITY Left 2023    Procedure: SFA, popliteal, and tibial thromboembolectomy and four compartment fasciotomy;  Surgeon: Ash Boucher MBBS;  [facility]      Allergies   Allergen Reactions    Penicillins Swelling and Anaphylaxis     Facial swelling    Has tolerated cefazolin.    Cefepime Rash     Diffuse whole body erythematous pruritic rash      Social History     Tobacco Use    Smoking status: Former     Packs/day: 0.50     Years: 40.00     Additional pack years: 0.00     Total pack years: 20.00     Types: Cigarettes     Quit date:      Years since quittin.1    Smokeless  tobacco: Not on file    Tobacco comments:     Last cigarette/ETOH September 2023   Substance Use Topics    Alcohol use: Not Currently     Comment: 1-2/wk      Wt Readings from Last 1 Encounters:   02/02/24 56.3 kg (124 lb 1.9 oz)        Anesthesia Evaluation   Pt has had prior anesthetic. Type: General.        ROS/MED HX  ENT/Pulmonary: Comment: Left sided vocal cord palsy due to prolonged intubation.    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                                 METS/Exercise Tolerance:     Hematologic:     (+) History of blood clots,    pt is anticoagulated, anemia, history of blood transfusion, no previous transfusion reaction,        Musculoskeletal: Comment: Left AKA      GI/Hepatic:     (+) GERD, Asymptomatic on medication,                  Renal/Genitourinary:     (+) renal disease, type: CRI, Pt does not require dialysis,           Endo:  - neg endo ROS     Psychiatric/Substance Use:     (+)   alcohol abuse      Infectious Disease:  - neg infectious disease ROS     Malignancy:  - neg malignancy ROS     Other:            Physical Exam    Airway  airway exam normal      Mallampati: II   TM distance: > 3 FB   Neck ROM: limited   Mouth opening: > 3 cm    Respiratory Devices and Support         Dental       (+) Multiple visibly decayed, broken teeth      Cardiovascular   cardiovascular exam normal       Rhythm and rate: regular and normal     Pulmonary   pulmonary exam normal        breath sounds clear to auscultation           OUTSIDE LABS:  CBC:   Lab Results  "  Component Value Date    WBC 6.9 02/05/2024    WBC 6.8 02/04/2024    HGB 9.5 (L) 02/05/2024    HGB 10.0 (L) 02/04/2024    HCT 29.5 (L) 02/05/2024    HCT 30.2 (L) 02/04/2024    PLT 96 (L) 02/05/2024     (L) 02/04/2024     BMP:   Lab Results   Component Value Date     02/05/2024     02/04/2024    POTASSIUM 4.3 02/05/2024    POTASSIUM 4.2 02/04/2024    CHLORIDE 116 (H) 02/05/2024    CHLORIDE 108 (H) 02/04/2024    CO2 20 (L) 02/05/2024    CO2 19 (L) 02/04/2024    BUN 36.0 (H) 02/05/2024    BUN 33.5 (H) 02/04/2024    CR 1.75 (H) 02/05/2024    CR 1.63 (H) 02/04/2024     (H) 02/05/2024    GLC 94 02/04/2024     COAGS:   Lab Results   Component Value Date    PTT 80 (H) 12/12/2023    INR 1.27 (H) 12/06/2023    FIBR 556 (H) 10/25/2023     POC: No results found for: \"BGM\", \"HCG\", \"HCGS\"  HEPATIC:   Lab Results   Component Value Date    ALBUMIN 3.5 01/10/2024    PROTTOTAL 6.2 (L) 01/10/2024    ALT 16 01/10/2024    AST 17 01/10/2024    ALKPHOS 75 01/10/2024    BILITOTAL <0.2 01/10/2024    BHUPENDRA 18 10/25/2023     OTHER:   Lab Results   Component Value Date    PH 7.41 10/14/2023    PH 7.41 10/14/2023    LACT 1.5 10/28/2023    A1C 5.7 (H) 09/25/2023    OMAR 9.1 02/05/2024    PHOS 4.7 (H) 12/18/2023    MAG 1.7 12/18/2023    LIPASE 51 10/14/2023    TSH 16.70 (H) 10/25/2023       Anesthesia Plan    ASA Status:  4    NPO Status:  NPO Appropriate    Anesthesia Type: General.     - Airway: ETT   Induction: Intravenous.   Maintenance: Balanced.   Techniques and Equipment:     - Lines/Monitors: 2nd IV     Consents    Anesthesia Plan(s) and associated risks, benefits, and realistic alternatives discussed. Questions answered and patient/representative(s) expressed understanding.     - Discussed: Risks, Benefits and Alternatives for BOTH SEDATION and the PROCEDURE were discussed     - Discussed with:  Patient      - Extended Intubation/Ventilatory Support Discussed: No.      - Patient is DNR/DNI Status: No     Use of " blood products discussed: No .     Postoperative Care    Pain management: IV analgesics, Oral pain medications, Multi-modal analgesia.   PONV prophylaxis: Ondansetron (or other 5HT-3), Dexamethasone or Solumedrol     Comments:    Other Comments: The material risks, benefits, and alternatives were discussed in detail.  The patient agrees to proceed.  The patient has no other complaints at this time.       H&P reviewed: Unable to attach H&P to encounter due to EHR limitations. H&P Update: appropriate H&P reviewed, patient examined. No interval changes since H&P (within 30 days).        Mason Curran MD    I have reviewed the pertinent notes and labs in the chart from the past 30 days and (re)examined the patient.  Any updates or changes from those notes are reflected in this note.

## 2024-02-10 LAB
ACID FAST STAIN (ARUP): NORMAL

## 2024-02-12 ENCOUNTER — OFFICE VISIT (OUTPATIENT)
Dept: VASCULAR SURGERY | Facility: CLINIC | Age: 69
End: 2024-02-12
Payer: COMMERCIAL

## 2024-02-12 VITALS — HEART RATE: 65 BPM | DIASTOLIC BLOOD PRESSURE: 81 MMHG | SYSTOLIC BLOOD PRESSURE: 152 MMHG | OXYGEN SATURATION: 99 %

## 2024-02-12 DIAGNOSIS — Z89.612 S/P AKA (ABOVE KNEE AMPUTATION) UNILATERAL, LEFT (H): ICD-10-CM

## 2024-02-12 DIAGNOSIS — T87.9 AKA STUMP COMPLICATION (H): Primary | ICD-10-CM

## 2024-02-12 PROCEDURE — 99024 POSTOP FOLLOW-UP VISIT: CPT | Performed by: NURSE PRACTITIONER

## 2024-02-12 PROCEDURE — 99207 PR NEG PRESSURE WOUND THERAPY NON DME </= 50 SQ CM: CPT | Performed by: NURSE PRACTITIONER

## 2024-02-12 ASSESSMENT — PAIN SCALES - GENERAL: PAINLEVEL: NO PAIN (0)

## 2024-02-12 NOTE — LETTER
2/12/2024       RE: Alfredo Burnham  [address]  [place] [address]     Dear Colleague,    Thank you for referring your patient, Alfredo Burnham, to the [facility] [place] at [facility]. Please see a copy of my visit note below.        VASCULAR SURGERY PROGRESS NOTE    LOCATION: [facility]     Alfredo Burnham  Medical Record #:  [id number]  YOB: 1955  Age:  68 year old     Date of Service: 2/12/2024    PRIMARY CARE PROVIDER: Clinic, [facility]    Reason for visit: Wound check and replication of Prevena     IMPRESSION / RECOMMENDATION:   Alfredo Burnham is a very pleasant 68-year-old patient well known to vascular surgery service who is s/p multiple washouts of LLE stump, now closed on 2/2/24 followed by Prevena application. Incision is healing without drainage, no redness, no signs or symptoms of infection. During this clinic visit, we exchanged the previous Prevena, his incision is 12cm long, no depth or width, closed with sutures. Site was cleaned in sterile fashion (utilized sterile towels, chloraprep, and sterile gloves), cleaned site and dressed with new Prevena. His FARIDA drain was also removed during this visit as there was zero drainage since discharge. Patient noted he did not need to empty the drain while at home.     Plan to follow weekly (until March 5th) for wound assessment and Prevena dressings exchanged. No restrictions on working with PT. No weight bearing on LLE or weight lifting with LLE.     All questions were answered and support provided. He has our contact information and knows to reach out with any additional questions or concerns.     Miryam Carrasco, CNP  Vascular Surgery  Pager: [phone]  [email or web address]  Send message or 10 digit call back number Securely via Codota with the Vocera Web Console (learn more here)      PE:  BP (!) 152/81 (BP  Location: Right arm, Patient Position: Sitting, Cuff Size: Adult Regular)   Pulse 65   SpO2 99%   Wt Readings from Last 1 Encounters:   02/02/24 56.3 kg (124 lb 1.9 oz)     There is no height or weight on file to calculate BMI.    EXAM:  GENERAL: well-developed 68 year old male who appears his stated age  CARDIAC: normal   CHEST/LUNG: normal respiratory effort   MUSCULOSKELETAL: grossly normal and both lower extremities are intact, no lower extremity edema  NEUROLOGIC: focally intact, alert and oriented x 3  PSYCH: appropriate affect  VASCULAR: Left stump incision without drainage, well approximated, incision is 12cm long, closed. Prevena was on negative pressure.    Left stump pictures after iodine was applied:                     Again, thank you for allowing me to participate in the care of your patient.      Sincerely,    ALTAGRACIA Figueroa CNP

## 2024-02-12 NOTE — PROGRESS NOTES
VASCULAR SURGERY PROGRESS NOTE    LOCATION: [facility]     Alfredo Burnham  Medical Record #:  [id number]  YOB: 1955  Age:  68 year old     Date of Service: 2/12/2024    PRIMARY CARE PROVIDER: Clinic, [facility]    Reason for visit: Wound check and replication of Prevena     IMPRESSION / RECOMMENDATION:   Alfredo Burnham is a very pleasant 68-year-old patient well known to vascular surgery service who is s/p multiple washouts of LLE stump, now closed on 2/2/24 followed by Prevena application. Incision is healing without drainage, no redness, no signs or symptoms of infection. During this clinic visit, we exchanged the previous Prevena, his incision is 12cm long, no depth or width, closed with sutures. Site was cleaned in sterile fashion (utilized sterile towels, chloraprep, and sterile gloves), cleaned site and dressed with new Prevena. His FARIDA drain was also removed during this visit as there was zero drainage since discharge. Patient noted he did not need to empty the drain while at home.     Plan to follow weekly (until March 5th) for wound assessment and Prevena dressings exchanged. No restrictions on working with PT. No weight bearing on LLE or weight lifting with LLE.     All questions were answered and support provided. He has our contact information and knows to reach out with any additional questions or concerns.     Miryam Carrasco, [facility]  Vascular Surgery  Pager: [phone]  [email or web address]  Send message or 10 digit call back number Securely via Munch[employer] with the [employer]unch[employer] Web Console (learn more here)      PE:  BP (!) 152/81 (BP Location: Right arm, Patient Position: Sitting, Cuff Size: Adult Regular)   Pulse 65   SpO2 99%   Wt Readings from Last 1 Encounters:   02/02/24 56.3 kg (124 lb 1.9 oz)     There is no height or weight on file to calculate BMI.    EXAM:  GENERAL: well-developed 68 year old male who appears his stated age  CARDIAC: normal    CHEST/LUNG: normal respiratory effort   MUSCULOSKELETAL: grossly normal and both lower extremities are intact, no lower extremity edema  NEUROLOGIC: focally intact, alert and oriented x 3  PSYCH: appropriate affect  VASCULAR: Left stump incision without drainage, well approximated, incision is 12cm long, closed. Prevena was on negative pressure.    Left stump pictures after iodine was applied:

## 2024-02-12 NOTE — PATIENT INSTRUCTIONS
Thank you so much for choosing us for your care. It was a pleasure to see you at the vascular clinic today.     Plan to follow weekly (until March 5th) for wound assessment and Prevena dressings exchanged. No restrictions on working with PT. No weight bearing on LLE or weight lifting with LLE.         If you have any questions, please contact our clinic directly at [phone] and ask for the nurse. We also encourage the use of Plango to communicate with your HealthCare Provider.        If you have an urgent need after business hours (8:00 am to 4:30 pm) please call [phone], option 4, and ask for the vascular attending on call. For non-urgent after hours needs, please call the vascular nurse at [phone] and leave a detailed voicemail. For scheduling needs, please call our clinic directly at [phone].]

## 2024-02-19 ENCOUNTER — OFFICE VISIT (OUTPATIENT)
Dept: VASCULAR SURGERY | Facility: CLINIC | Age: 69
End: 2024-02-19
Payer: COMMERCIAL

## 2024-02-19 VITALS — SYSTOLIC BLOOD PRESSURE: 168 MMHG | HEART RATE: 73 BPM | OXYGEN SATURATION: 99 % | DIASTOLIC BLOOD PRESSURE: 84 MMHG

## 2024-02-19 DIAGNOSIS — Z89.612 S/P AKA (ABOVE KNEE AMPUTATION) UNILATERAL, LEFT (H): Primary | ICD-10-CM

## 2024-02-19 PROCEDURE — 99207 PR NEG PRESSURE WOUND THERAPY NON DME </= 50 SQ CM: CPT | Performed by: NURSE PRACTITIONER

## 2024-02-19 PROCEDURE — 99024 POSTOP FOLLOW-UP VISIT: CPT | Performed by: NURSE PRACTITIONER

## 2024-02-19 NOTE — PROGRESS NOTES
VASCULAR SURGERY PROGRESS NOTE    LOCATION: [facility]     Alfredo Burnham  Medical Record #:  [id number]  YOB: 1955  Age:  68 year old     Date of Service: 2/19/2024    PRIMARY CARE PROVIDER: Clinic, [facility]    Reason for visit: Wound check and prevena exchange    IMPRESSION / RECOMMENDATION:   Alfredo Burnham is a very pleasant 68 years old gentleman who is s/p multiple washouts of LLE stump, now closed on 2/2/24. Incision is healing well, no erythema, no swelling, no drainage. Prevena exchanged, incision 12cm long, no depth or width as it is closed.    During this visit Mr. Burnham noted that he started having phantom limb pain, thus referred him to pain clinic for management. He also noted that his PCP plans to start him on a statin but he has antibiotic beads in the stump which might interact with the statin. I will reach out to Dr. Garcia from ID for guidance.     Plan to follow up next week for wound check and prevena exchange.    All questions were answered and support provided. He has our contact information and knows to reach out with any additional questions or concerns.       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 S/p multiple washouts, now LLE stump closed and antibiotic beads left in as they self dissolve. His incision is healing well, has been able to continue his activities, standing on 1 foot to cook dinner for [employer]'s day and overall resume cooking which he enjoys. Patient continues to work with PT and has nursing home care which he  probably does not need anymore.      REVIEW OF SYSTEMS:    A 12 point ROS was reviewed and is negative except for what is listed above in HPI.    PHH:    Past Medical History:   Diagnosis Date     Hypertension 02/08/2011     Macular degeneration           Past Surgical History:   Procedure Laterality Date     AMPUTATE LEG ABOVE KNEE Left 10/17/2023    Procedure: AMPUTATION, ABOVE KNEE and Abdominal wound vac exchange;  Surgeon: Ash Boucher MBBS;  [facility]     AMPUTATE REVISION STUMP LOWER EXTREMITY Left 12/8/2023    Procedure: REVISION, AMPUTATION STUMP, LEFT LOWER EXTREMITY;  Surgeon: Boris Lowe;  [facility]     AMPUTATE REVISION STUMP LOWER EXTREMITY Left 12/15/2023    Procedure: IRRIGATION AND DEBRIDEMENT OF LEFT AMPUTATION STUMP, WITH CEMENT ANTIBIOTIC BEAD INSERTION X3;  Surgeon: Boris Lowe;  [facility]     AMPUTATE REVISION STUMP LOWER EXTREMITY Left 2/2/2024    Procedure: Left Stump Revision with complex wound closure;  Surgeon: Boris Lowe MD;  [facility]     CLOSE SECONDARY WOUND ABDOMEN N/A 10/20/2023    Procedure: Delayed primary subcutaneous abdominal closure;  Surgeon: Boris Lowe;  [facility]     INCISION AND DRAINAGE ABDOMEN WASHOUT, COMBINED N/A 09/25/2023    Procedure: abdominal washout, combined, repacking,  abthera placement;  Surgeon: Ash Boucher MBBS;  [facility]     INCISION AND DRAINAGE ABDOMEN WASHOUT, COMBINED N/A 09/26/2023    Procedure: Abdominal washout, Retroperitoneal closure, washout left lower extremity wound;  Surgeon: Boris Lowe;  [facility]     INCISION AND DRAINAGE LOWER EXTREMITY, COMBINED Left 12/1/2023    Procedure: irrigation and drainage of collection left above knee amputation stump;  Surgeon: Ash Boucher MBBS;  [facility]     IR CVC NON TUNNEL LINE REMOVAL  10/18/2023     IR CVC TUNNEL PLACEMENT > 5 YRS OF AGE  10/18/2023     IR CVC TUNNEL REMOVAL RIGHT  11/02/2023      IR OR ANGIOGRAM  09/25/2023     IRRIGATION AND DEBRIDEMENT ABDOMEN WASHOUT, COMBINED N/A 10/02/2023    Procedure: Exploratory laparotomy, abominal closure, wound vac change left lower extremity;  Surgeon: Jonathan Fry MD;  [facility]     IRRIGATION AND DEBRIDEMENT ABDOMEN WASHOUT, COMBINED N/A 9/29/2023    Procedure: exploration of  ABDOMINAL CAVITY, placement of abthera;  Surgeon: Boris Lowe MD;  [facility]     IRRIGATION AND DEBRIDEMENT LOWER EXTREMITY, COMBINED Left 9/29/2023    Procedure: exploration of left lower extremity, partial closure of left lower extremity, wound vac placement;  Surgeon: Boris Lowe MD;  [facility]     LAPAROTOMY EXPLORATORY N/A 09/25/2023    Procedure: Laparotomy exploratory;  Surgeon: Roger Shirley MD;  [facility]     PICC INSERTION - DOUBLE LUMEN Right 12/04/2023    43-1cm, Basilic vein     REPAIR ANEURYSM ABDOMINAL AORTA N/A 09/24/2023    Procedure: Resection of Ruptureed Abdominal Aneurysm, Aortic biliary bypass with 20 x 10 mm Bifurcated hemagard graft, Temporary Abdominal Closure, Endovascular Balloon Inclusion of Aorta;  Surgeon: Boris Lowe;  [facility]     SIGMOIDOSCOPY FLEXIBLE N/A 09/25/2023    Procedure: Sigmoidoscopy flexible;  Surgeon: Gabino Walker MD;  [facility]     THROMBECTOMY LOWER EXTREMITY Left 09/25/2023    Procedure: SFA, popliteal, and tibial thromboembolectomy and four compartment fasciotomy;  Surgeon: Ash Boucher MBBS;  [facility]       ALLERGIES:  Penicillins and Cefepime    MEDS:    Current Outpatient Medications:      amLODIPine (NORVASC) 10 MG tablet, Take 1 tablet (10 mg) by mouth daily for 360 days Hold for SBP<105, Disp: 90 tablet, Rfl: 3     apixaban ANTICOAGULANT (ELIQUIS) 5 MG tablet, Take 1 tablet (5 mg) by mouth 2 times daily for 90 days, Disp: 180 tablet, Rfl: 0     atorvastatin (LIPITOR) 10 MG tablet, Take 1 tablet (10 mg) by mouth every evening (Patient not  taking: Reported on 2/12/2024), Disp: , Rfl:      camphor-menthol (DERMASARRA) 0.5-0.5 % external lotion, APPLY THIN LAYER TOPICALLY FOUR TIMES EVERY DAY AS NEEDED FOR ITCHING, Disp: , Rfl:      docusate sodium (COLACE) 50 MG capsule, Take 50 mg by mouth 2 times daily, Disp: , Rfl:      melatonin 3 MG tablet, Take 1 tablet (3 mg) by mouth every evening, Disp: , Rfl:      metoprolol tartrate (LOPRESSOR) 100 MG tablet, Take 1 tablet (100 mg) by mouth 2 times daily for 360 days Hold for SBP<100, Disp: 180 tablet, Rfl: 3     pantoprazole (PROTONIX) 40 MG EC tablet, Take 1 tablet (40 mg) by mouth every 24 hours for 360 days, Disp: 90 tablet, Rfl: 3     QUEtiapine (SEROQUEL) 25 MG tablet, Take 3 tablets (75 mg) by mouth at bedtime for 360 days (Patient not taking: Reported on 1/15/2024), Disp: 270 tablet, Rfl: 3     QUEtiapine (SEROQUEL) 25 MG tablet, Take 1 tablet (25 mg) by mouth 2 times daily as needed For anxiety (Patient not taking: Reported on 1/15/2024), Disp: 180 tablet, Rfl: 3     UNABLE TO FIND, MEDICATION NAME: [facility] - [facility] and spouse unsure of name, Disp: , Rfl:      Vitamin D3 (CHOLECALCIFEROL) 25 mcg (1000 units) tablet, Take 2 tablets (50 mcg) by mouth every 24 hours for 360 days, Disp: 180 tablet, Rfl: 3    SOCIAL HABITS:    History   Smoking Status     Former     Packs/day: 0.50     Years: 40.00     Types: Cigarettes     Quit date: 2023   Smokeless Tobacco     Not on file     Social History    Substance and Sexual Activity      Alcohol use: Not Currently        Comment: 1-2/wk      History   Drug Use Unknown       FAMILY HISTORY:    Family History   Problem Relation Age of Onset     Unknown/Adopted Mother      Heart Disease Mother      Arthritis Mother      Hypertension Brother      Blood Disease Sister      Psychotic Disorder Sister        PE:  BP (!) 168/84 (BP Location: Right arm, Patient Position: Sitting, Cuff Size: Adult Regular)   Pulse 73   SpO2 99%   Wt Readings  from Last 1 Encounters:   02/02/24 56.3 kg (124 lb 1.9 oz)     There is no height or weight on file to calculate BMI.    EXAM:  GENERAL: well-developed 68 year old male who appears his stated age  CARDIAC: normal   CHEST/LUNG: normal respiratory effort   MUSCULOSKELETAL: grossly normal and both lower extremities are intact, no lower extremity edema  NEUROLOGIC: focally intact, alert and oriented x 3  PSYCH: appropriate affect  Extremities: LLE stump healing well, no drainage in prevena cannister, no redness, no swelling. Incision well approximated. Sutures intact.

## 2024-02-19 NOTE — PATIENT INSTRUCTIONS
Thank you so much for choosing us for your care. It was a pleasure to see you at the vascular clinic today.     Follow-up recommendations: We will see you back in 7 days. Please do not hesitate to reach out to us in the meantime with any concerns. Our schedulers will get in touch with you to set up your follow-up visit.        Our scheduling team will get in touch with you to set up any follow-up testing/imaging and/or appointments. Please be aware that any testing/imaging recommended today will need to completed prior to your next visit with the provider. If testing/imaging is not completed prior to your next visit, your visit may be rescheduled.        If you have any questions, please contact our clinic directly at [phone] and ask for the nurse. We also encourage the use of [employer] to communicate with your HealthCare Provider.        If you have an urgent need after business hours (8:00 am to 4:30 pm) please call [phone], option 4, and ask for the vascular attending on call. For non-urgent after hours needs, please call the vascular nurse at [phone] and leave a detailed voicemail. For scheduling needs, please call our clinic directly at [phone].

## 2024-02-19 NOTE — LETTER
2/19/2024       RE: Alfredo Burnham  [address]  [place] [address]       Dear Colleague,    Thank you for referring your patient, Alfredo Burnham, to the [facility] [place] [facility]. Please see a copy of my visit note below.        VASCULAR SURGERY PROGRESS NOTE    LOCATION: [facility]     Alfredo Burnham  Medical Record #:  [id number]  YOB: 1955  Age:  68 year old     Date of Service: 2/19/2024    PRIMARY CARE PROVIDER: Clinic, [facility]    Reason for visit: Wound check and prevena exchange    IMPRESSION / RECOMMENDATION:   Alfredo Burnham is a very pleasant 68 years old gentleman who is s/p multiple washouts of LLE stump, now closed on 2/2/24. Incision is healing well, no erythema, no swelling, no drainage. Prevena exchanged, incision 12cm long, no depth or width as it is closed.    During this visit Mr. Burnham noted that he started having phantom limb pain, thus referred him to pain clinic for management. He also noted that his PCP plans to start him on a statin but he has antibiotic beads in the stump which might interact with the statin. I will reach out to Dr. Garcia from [facility] for guidance.     Plan to follow up next week for wound check and prevena exchange.    All questions were answered and support provided. He has our contact information and knows to reach out with any additional questions or concerns.       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 S/p  multiple washouts, now LLE stump closed and antibiotic beads left in as they self dissolve. His incision is healing well, has been able to continue his activities, standing on 1 foot to cook dinner for [employer]'s day and overall resume cooking which he enjoys. Patient continues to work with PT and has nursing home care which he probably does not need anymore.      REVIEW OF SYSTEMS:    A 12 point ROS was reviewed and is negative except for what is listed above in HPI.    PHH:    Past Medical History:   Diagnosis Date    Hypertension 02/08/2011    Macular degeneration           Past Surgical History:   Procedure Laterality Date    AMPUTATE LEG ABOVE KNEE Left 10/17/2023    Procedure: AMPUTATION, ABOVE KNEE and Abdominal wound vac exchange;  Surgeon: Ash Boucher MBBS;  [facility]    AMPUTATE REVISION STUMP LOWER EXTREMITY Left 12/8/2023    Procedure: REVISION, AMPUTATION STUMP, LEFT LOWER EXTREMITY;  Surgeon: Boris Lowe;  [facility]    AMPUTATE REVISION STUMP LOWER EXTREMITY Left 12/15/2023    Procedure: IRRIGATION AND DEBRIDEMENT OF LEFT AMPUTATION STUMP, WITH CEMENT ANTIBIOTIC BEAD INSERTION X3;  Surgeon: Boris Lowe;  [facility]    AMPUTATE REVISION STUMP LOWER EXTREMITY Left 2/2/2024    Procedure: Left Stump Revision with complex wound closure;  Surgeon: Boris Lowe MD;  [facility]    CLOSE SECONDARY WOUND ABDOMEN N/A 10/20/2023    Procedure: Delayed primary subcutaneous abdominal closure;  Surgeon: Boris Lowe;  [facility]    INCISION AND DRAINAGE ABDOMEN WASHOUT, COMBINED N/A 09/25/2023    Procedure: abdominal washout, combined, repacking,  abthera placement;  Surgeon: Ash Boucher MBBS;  [facility]    INCISION AND DRAINAGE ABDOMEN WASHOUT, COMBINED N/A 09/26/2023    Procedure: Abdominal washout, Retroperitoneal closure, washout left lower extremity wound;  Surgeon: Boris Lowe;  [facility]    INCISION AND DRAINAGE LOWER  EXTREMITY, COMBINED Left 12/1/2023    Procedure: irrigation and drainage of collection left above knee amputation stump;  Surgeon: Ash Boucher MBBS;  [facility]    IR CVC NON TUNNEL LINE REMOVAL  10/18/2023    IR CVC TUNNEL PLACEMENT > 5 YRS OF AGE  10/18/2023    IR CVC TUNNEL REMOVAL RIGHT  11/02/2023    IR OR ANGIOGRAM  09/25/2023    IRRIGATION AND DEBRIDEMENT ABDOMEN WASHOUT, COMBINED N/A 10/02/2023    Procedure: Exploratory laparotomy, abominal closure, wound vac change left lower extremity;  Surgeon: Jonathan Fry MD;  [facility]    IRRIGATION AND DEBRIDEMENT ABDOMEN WASHOUT, COMBINED N/A 9/29/2023    Procedure: exploration of  ABDOMINAL CAVITY, placement of abthera;  Surgeon: Boris Lowe MD;  [facility]    IRRIGATION AND DEBRIDEMENT LOWER EXTREMITY, COMBINED Left 9/29/2023    Procedure: exploration of left lower extremity, partial closure of left lower extremity, wound vac placement;  Surgeon: Brois Lowe MD;  [facility]    LAPAROTOMY EXPLORATORY N/A 09/25/2023    Procedure: Laparotomy exploratory;  Surgeon: Roger Shirley MD;  [facility]    PICC INSERTION - DOUBLE LUMEN Right 12/04/2023    43-1cm, Basilic vein    REPAIR ANEURYSM ABDOMINAL AORTA N/A 09/24/2023    Procedure: Resection of Ruptureed Abdominal Aneurysm, Aortic biliary bypass with 20 x 10 mm Bifurcated hemagard graft, Temporary Abdominal Closure, Endovascular Balloon Inclusion of Aorta;  Surgeon: Boris Lowe;  [facility]    SIGMOIDOSCOPY FLEXIBLE N/A 09/25/2023    Procedure: Sigmoidoscopy flexible;  Surgeon: Gabino Walker MD;  [facility]    THROMBECTOMY LOWER EXTREMITY Left 09/25/2023    Procedure: SFA, popliteal, and tibial thromboembolectomy and four compartment fasciotomy;  Surgeon: Ash Boucher MBBS;  [facility]       ALLERGIES:  Penicillins and Cefepime    MEDS:    Current Outpatient Medications:     amLODIPine (NORVASC) 10 MG tablet, Take 1 tablet  (10 mg) by mouth daily for 360 days Hold for SBP<105, Disp: 90 tablet, Rfl: 3    apixaban ANTICOAGULANT (ELIQUIS) 5 MG tablet, Take 1 tablet (5 mg) by mouth 2 times daily for 90 days, Disp: 180 tablet, Rfl: 0    atorvastatin (LIPITOR) 10 MG tablet, Take 1 tablet (10 mg) by mouth every evening (Patient not taking: Reported on 2/12/2024), Disp: , Rfl:     camphor-menthol (DERMASARRA) 0.5-0.5 % external lotion, APPLY THIN LAYER TOPICALLY FOUR TIMES EVERY DAY AS NEEDED FOR ITCHING, Disp: , Rfl:     docusate sodium (COLACE) 50 MG capsule, Take 50 mg by mouth 2 times daily, Disp: , Rfl:     melatonin 3 MG tablet, Take 1 tablet (3 mg) by mouth every evening, Disp: , Rfl:     metoprolol tartrate (LOPRESSOR) 100 MG tablet, Take 1 tablet (100 mg) by mouth 2 times daily for 360 days Hold for SBP<100, Disp: 180 tablet, Rfl: 3    pantoprazole (PROTONIX) 40 MG EC tablet, Take 1 tablet (40 mg) by mouth every 24 hours for 360 days, Disp: 90 tablet, Rfl: 3    QUEtiapine (SEROQUEL) 25 MG tablet, Take 3 tablets (75 mg) by mouth at bedtime for 360 days (Patient not taking: Reported on 1/15/2024), Disp: 270 tablet, Rfl: 3    QUEtiapine (SEROQUEL) 25 MG tablet, Take 1 tablet (25 mg) by mouth 2 times daily as needed For anxiety (Patient not taking: Reported on 1/15/2024), Disp: 180 tablet, Rfl: 3    UNABLE TO FIND, MEDICATION NAME: [facility] - [facility] and spouse unsure of name, Disp: , Rfl:     Vitamin D3 (CHOLECALCIFEROL) 25 mcg (1000 units) tablet, Take 2 tablets (50 mcg) by mouth every 24 hours for 360 days, Disp: 180 tablet, Rfl: 3    SOCIAL HABITS:    History   Smoking Status    Former    Packs/day: 0.50    Years: 40.00    Types: Cigarettes    Quit date: 2023   Smokeless Tobacco    Not on file     Social History    Substance and Sexual Activity      Alcohol use: Not Currently        Comment: 1-2/wk      History   Drug Use Unknown       FAMILY HISTORY:    Family History   Problem Relation Age of Onset     Unknown/Adopted Mother     Heart Disease Mother     Arthritis Mother     Hypertension Brother     Blood Disease Sister     Psychotic Disorder Sister        PE:  BP (!) 168/84 (BP Location: Right arm, Patient Position: Sitting, Cuff Size: Adult Regular)   Pulse 73   SpO2 99%   Wt Readings from Last 1 Encounters:   02/02/24 56.3 kg (124 lb 1.9 oz)     There is no height or weight on file to calculate BMI.    EXAM:  GENERAL: well-developed 68 year old male who appears his stated age  CARDIAC: normal   CHEST/LUNG: normal respiratory effort   MUSCULOSKELETAL: grossly normal and both lower extremities are intact, no lower extremity edema  NEUROLOGIC: focally intact, alert and oriented x 3  PSYCH: appropriate affect  Extremities: LLE stump healing well, no drainage in prevena cannister, no redness, no swelling. Incision well approximated. Sutures intact.                   Again, thank you for allowing me to participate in the care of your patient.      Sincerely,    ALTAGRACIA Figueroa CNP

## 2024-02-20 ENCOUNTER — TELEPHONE (OUTPATIENT)
Dept: CT IMAGING | Facility: CLINIC | Age: 69
End: 2024-02-20

## 2024-02-20 ENCOUNTER — TELEPHONE (OUTPATIENT)
Dept: VASCULAR SURGERY | Facility: CLINIC | Age: 69
End: 2024-02-20
Payer: COMMERCIAL

## 2024-02-20 DIAGNOSIS — Z89.612 S/P AKA (ABOVE KNEE AMPUTATION) UNILATERAL, LEFT (H): Primary | ICD-10-CM

## 2024-02-20 PROCEDURE — 99024 POSTOP FOLLOW-UP VISIT: CPT | Performed by: NURSE PRACTITIONER

## 2024-02-20 RX ORDER — METHOCARBAMOL 500 MG/1
500 TABLET, FILM COATED ORAL EVERY 8 HOURS
Qty: 42 TABLET | Refills: 0 | Status: SHIPPED | OUTPATIENT
Start: 2024-02-20 | End: 2024-02-26

## 2024-02-20 NOTE — TELEPHONE ENCOUNTER
EP called; spoke with wife 2/20 to sched a follow up with Dr. Garcia per provider.     ----- Message from Nidia Garcia MD sent at 2/20/2024 11:19 AM CST -----  [facility] Team:    Please schedule follow up with me in coming days based on availability - okay for virtual, but also check with the patient and wife before scheduling.     Let me know if you have any question for scheduling him.     Thanks,   Nidia

## 2024-02-20 NOTE — TELEPHONE ENCOUNTER
Brief Vascular Surgery Telephone Note    Called Mr. Burnham this morning to communicate about his concern for antibiotic beads and interaction with statin.  Upon communication with infectious disease, there should be no interactions given antibiotic beads are local.  Patient also noted that she would like to schedule follow-up with ID, I will communicate this with Dr. Garcia.    Last night Nelson continues to have phantom limb pain, this was intensified by the dressing changes with Prevena yesterday. Advised him to take 2.5 oxycodone as needed for pain, prescribed Robaxin 500 mg every 8 hours as needed for pain in addition to Tylenol 975/1000 mg he takes daily. Advised against NSAIDs. Patient has follow-up scheduled with pain clinic which he looks forward to.    [facility] Should [facility] be needed, will send to [facility] in [place] as well.  They will let me know if the [facility] not [facility] or cannot [facility] the [facility].    Mr. Burnham and his wife aTbby verbalized clear understanding of plan, asked questions and they know to reach out should new symptoms or concerns arise.    Miryam Carrasco, CNP  Vascular Surgery  Pager: [phone]  [email or web address]  Send message or 10 digit call back number Securely via [employer] with the StyleSeat Web Console (learn more here)

## 2024-02-21 ENCOUNTER — DOCUMENTATION ONLY (OUTPATIENT)
Dept: INFECTIOUS DISEASES | Facility: CLINIC | Age: 69
End: 2024-02-21
Payer: COMMERCIAL

## 2024-02-23 ENCOUNTER — TELEPHONE (OUTPATIENT)
Dept: ANESTHESIOLOGY | Facility: CLINIC | Age: 69
End: 2024-02-23
Payer: COMMERCIAL

## 2024-02-26 ENCOUNTER — OFFICE VISIT (OUTPATIENT)
Dept: VASCULAR SURGERY | Facility: CLINIC | Age: 69
End: 2024-02-26
Payer: COMMERCIAL

## 2024-02-26 VITALS — SYSTOLIC BLOOD PRESSURE: 158 MMHG | DIASTOLIC BLOOD PRESSURE: 88 MMHG | OXYGEN SATURATION: 99 % | HEART RATE: 76 BPM

## 2024-02-26 DIAGNOSIS — Z89.612 S/P AKA (ABOVE KNEE AMPUTATION) UNILATERAL, LEFT (H): ICD-10-CM

## 2024-02-26 PROCEDURE — 99024 POSTOP FOLLOW-UP VISIT: CPT | Performed by: NURSE PRACTITIONER

## 2024-02-26 RX ORDER — METHOCARBAMOL 500 MG/1
500 TABLET, FILM COATED ORAL EVERY 8 HOURS
Qty: 42 TABLET | Refills: 0 | Status: SHIPPED | OUTPATIENT
Start: 2024-02-26

## 2024-02-26 NOTE — LETTER
2/26/2024       RE: Alfredo Burnham  [address]  [place] [address]       Dear Colleague,    Thank you for referring your patient, Alfredo Burnham, to the [facility] VASCULAR CLINIC [place] at [facility]. Please see a copy of my visit note below.        VASCULAR SURGERY PROGRESS NOTE    LOCATION: [facility]     Alfredo Burnham  Medical Record #:  [id number]  YOB: 1955  Age:  68 year old     Date of Service: 2/26/2024    PRIMARY CARE PROVIDER: Clinic, [facility]    Reason for visit: Wound check and prevena exchange     IMPRESSION / RECOMMENDATION:   Alfredo Burnham is a very pleasant 68 years old gentleman who is s/p multiple washouts of LLE stump, closed on 2/2/24. Incision is healing well, no erythema, no swelling, no drainage. Prevena exchanged, incision 12cm long, no depth or width as it is closed.    Discussed with patient and his wife to keep Prevena on at all times. Do not allow the pump to be turned off at any time, should you have difficulty keeping Prevena pump on at all times, please do not hesitate to call vascular surgery immediately.    Plan to follow up next week for wound check and prevena exchange.      All questions were answered and support provided. He has our contact information and knows to reach out with any additional questions or concerns.     Miryam Carrasco [facility]  Vascular Surgery  Pager: [phone]  [email or web address]  Send message or 10 digit call back number Securely via Estimize with the Estimize Web Console (learn more here)        HPI:  Alfredo Burnham is a 68 year old male with past medical history significant for Left AKA revision, and has been recovering well.  Patient continues to work with physical therapy, he stands with cooking, LLE vision is without redness, no drainage, he is well-approximated.  No signs or symptoms of infection.    REVIEW OF  SYSTEMS:    A 12 point ROS was reviewed and is negative except for what is listed above in HPI.    PHH:    Past Medical History:   Diagnosis Date    Hypertension 02/08/2011    Macular degeneration           Past Surgical History:   Procedure Laterality Date    AMPUTATE LEG ABOVE KNEE Left 10/17/2023    Procedure: AMPUTATION, ABOVE KNEE and Abdominal wound vac exchange;  Surgeon: Ash Boucher MBBS;  [facility]    AMPUTATE REVISION STUMP LOWER EXTREMITY Left 12/8/2023    Procedure: REVISION, AMPUTATION STUMP, LEFT LOWER EXTREMITY;  Surgeon: Boris Lowe;  [facility]    AMPUTATE REVISION STUMP LOWER EXTREMITY Left 12/15/2023    Procedure: IRRIGATION AND DEBRIDEMENT OF LEFT AMPUTATION STUMP, WITH CEMENT ANTIBIOTIC BEAD INSERTION X3;  Surgeon: Boris Lowe;  [facility]    AMPUTATE REVISION STUMP LOWER EXTREMITY Left 2/2/2024    Procedure: Left Stump Revision with complex wound closure;  Surgeon: Boris Lowe MD;  [facility]    CLOSE SECONDARY WOUND ABDOMEN N/A 10/20/2023    Procedure: Delayed primary subcutaneous abdominal closure;  Surgeon: Boris Lowe;  [facility]    INCISION AND DRAINAGE ABDOMEN WASHOUT, COMBINED N/A 09/25/2023    Procedure: abdominal washout, combined, repacking,  abthera placement;  Surgeon: Ash Boucher MBBS;  [facility]    INCISION AND DRAINAGE ABDOMEN WASHOUT, COMBINED N/A 09/26/2023    Procedure: Abdominal washout, Retroperitoneal closure, washout left lower extremity wound;  Surgeon: Boris Lowe;  [facility]    INCISION AND DRAINAGE LOWER EXTREMITY, COMBINED Left 12/1/2023    Procedure: irrigation and drainage of collection left above knee amputation stump;  Surgeon: Ash Boucher MBBS;  [facility]    IR CVC NON TUNNEL LINE REMOVAL  10/18/2023    IR CVC TUNNEL PLACEMENT > 5 YRS OF AGE  10/18/2023    IR CVC TUNNEL REMOVAL RIGHT  11/02/2023    IR OR ANGIOGRAM  09/25/2023    IRRIGATION AND DEBRIDEMENT  ABDOMEN WASHOUT, COMBINED N/A 10/02/2023    Procedure: Exploratory laparotomy, abominal closure, wound vac change left lower extremity;  Surgeon: Jonathan Fry MD;  [facility]    IRRIGATION AND DEBRIDEMENT ABDOMEN WASHOUT, COMBINED N/A 9/29/2023    Procedure: exploration of  ABDOMINAL CAVITY, placement of abthera;  Surgeon: Boris Lowe MD;  [facility]    IRRIGATION AND DEBRIDEMENT LOWER EXTREMITY, COMBINED Left 9/29/2023    Procedure: exploration of left lower extremity, partial closure of left lower extremity, wound vac placement;  Surgeon: Boris Lowe MD;  [facility]    LAPAROTOMY EXPLORATORY N/A 09/25/2023    Procedure: Laparotomy exploratory;  Surgeon: Roger Shirley MD;  [facility]    PICC INSERTION - DOUBLE LUMEN Right 12/04/2023    43-1cm, Basilic vein    REPAIR ANEURYSM ABDOMINAL AORTA N/A 09/24/2023    Procedure: Resection of Ruptureed Abdominal Aneurysm, Aortic biliary bypass with 20 x 10 mm Bifurcated hemagard graft, Temporary Abdominal Closure, Endovascular Balloon Inclusion of Aorta;  Surgeon: Boris Lowe;  [facility]    SIGMOIDOSCOPY FLEXIBLE N/A 09/25/2023    Procedure: Sigmoidoscopy flexible;  Surgeon: Gabino Walker MD;  [facility]    THROMBECTOMY LOWER EXTREMITY Left 09/25/2023    Procedure: SFA, popliteal, and tibial thromboembolectomy and four compartment fasciotomy;  Surgeon: Ash Boucher MBBS;  [facility]       ALLERGIES:  Penicillins and Cefepime    MEDS:    Current Outpatient Medications:     amLODIPine (NORVASC) 10 MG tablet, Take 1 tablet (10 mg) by mouth daily for 360 days Hold for SBP<105, Disp: 90 tablet, Rfl: 3    apixaban ANTICOAGULANT (ELIQUIS) 5 MG tablet, Take 1 tablet (5 mg) by mouth 2 times daily for 90 days, Disp: 180 tablet, Rfl: 0    atorvastatin (LIPITOR) 10 MG tablet, Take 1 tablet (10 mg) by mouth every evening (Patient not taking: Reported on 2/12/2024), Disp: , Rfl:     camphor-menthol  (DERMASARRA) 0.5-0.5 % external lotion, APPLY THIN LAYER TOPICALLY FOUR TIMES EVERY DAY AS NEEDED FOR ITCHING, Disp: , Rfl:     docusate sodium (COLACE) 50 MG capsule, Take 50 mg by mouth 2 times daily, Disp: , Rfl:     melatonin 3 MG tablet, Take 1 tablet (3 mg) by mouth every evening, Disp: , Rfl:     methocarbamol (ROBAXIN) 500 MG tablet, Take 1 tablet (500 mg) by mouth every 8 hours for 14 days, Disp: 42 tablet, Rfl: 0    metoprolol tartrate (LOPRESSOR) 100 MG tablet, Take 1 tablet (100 mg) by mouth 2 times daily for 360 days Hold for SBP<100, Disp: 180 tablet, Rfl: 3    pantoprazole (PROTONIX) 40 MG EC tablet, Take 1 tablet (40 mg) by mouth every 24 hours for 360 days, Disp: 90 tablet, Rfl: 3    QUEtiapine (SEROQUEL) 25 MG tablet, Take 3 tablets (75 mg) by mouth at bedtime for 360 days (Patient not taking: Reported on 1/15/2024), Disp: 270 tablet, Rfl: 3    QUEtiapine (SEROQUEL) 25 MG tablet, Take 1 tablet (25 mg) by mouth 2 times daily as needed For anxiety (Patient not taking: Reported on 1/15/2024), Disp: 180 tablet, Rfl: 3    UNABLE TO FIND, MEDICATION NAME: [facility] - patient and spouse unsure of name, Disp: , Rfl:     Vitamin D3 (CHOLECALCIFEROL) 25 mcg (1000 units) tablet, Take 2 tablets (50 mcg) by mouth every 24 hours for 360 days, Disp: 180 tablet, Rfl: 3    SOCIAL HABITS:    History   Smoking Status    Former    Packs/day: 0.50    Years: 40.00    Types: Cigarettes    Quit date: 2023   Smokeless Tobacco    Not on file     Social History    Substance and Sexual Activity      Alcohol use: Not Currently        Comment: 1-2/wk      History   Drug Use Unknown       FAMILY HISTORY:    Family History   Problem Relation Age of Onset    Unknown/Adopted Mother     Heart Disease Mother     Arthritis Mother     Hypertension Brother     Blood Disease Sister     Psychotic Disorder Sister        PE:  BP (!) 158/88 (BP Location: Right arm, Patient Position: Sitting, Cuff Size: Adult Regular)    Pulse 76   SpO2 99%   Wt Readings from Last 1 Encounters:   02/02/24 56.3 kg (124 lb 1.9 oz)     There is no height or weight on file to calculate BMI.    EXAM:  GENERAL: well-developed 68 year old male who appears his stated age  CARDIAC: normal   CHEST/LUNG: normal respiratory effort   MUSCULOSKELETAL: grossly normal and both lower extremities are intact, no lower extremity edema  NEUROLOGIC: focally intact, alert and oriented x 3  PSYCH: appropriate affect  SKIN: Sutures intact, no drainage, no signs or symptoms of infection.                 LABS:      Sodium   Date Value Ref Range Status   02/05/2024 138 135 - 145 mmol/L Final     Comment:     Reference intervals for this test were updated on 09/26/2023 to more accurately reflect our healthy population. There may be differences in the flagging of prior results with similar values performed with this method. Interpretation of those prior results can be made in the context of the updated reference intervals.    02/04/2024 137 135 - 145 mmol/L Final     Comment:     Reference intervals for this test were updated on 09/26/2023 to more accurately reflect our healthy population. There may be differences in the flagging of prior results with similar values performed with this method. Interpretation of those prior results can be made in the context of the updated reference intervals.    02/03/2024 136 135 - 145 mmol/L Final     Comment:     Reference intervals for this test were updated on 09/26/2023 to more accurately reflect our healthy population. There may be differences in the flagging of prior results with similar values performed with this method. Interpretation of those prior results can be made in the context of the updated reference intervals.    02/08/2011 138 133 - 144 mmol/L Final   02/18/2009 139 133 - 144 mmol/L Final     Urea Nitrogen   Date Value Ref Range Status   02/05/2024 36.0 (H) 8.0 - 23.0 mg/dL Final   02/04/2024 33.5 (H) 8.0 - 23.0 mg/dL Final    02/03/2024 30.3 (H) 8.0 - 23.0 mg/dL Final   02/08/2011 17 7 - 30 mg/dL Final   02/18/2009 14 7 - 30 mg/dL Final     Hemoglobin   Date Value Ref Range Status   02/05/2024 9.5 (L) 13.3 - 17.7 g/dL Final   02/04/2024 10.0 (L) 13.3 - 17.7 g/dL Final   02/03/2024 9.0 (L) 13.3 - 17.7 g/dL Final   02/18/2009 15.1 13.3 - 17.7 g/dL Final     Platelet Count   Date Value Ref Range Status   02/05/2024 96 (L) 150 - 450 10e3/uL Final   02/04/2024 100 (L) 150 - 450 10e3/uL Final   02/03/2024 87 (L) 150 - 450 10e3/uL Final   02/18/2009 157 150 - 450 10e9/L Final     INR   Date Value Ref Range Status   12/06/2023 1.27 (H) 0.85 - 1.15 Final   10/25/2023 1.24 (H) 0.85 - 1.15 Final   10/25/2023 1.24 (H) 0.85 - 1.15 Final   10/15/2009 0.9  Final   02/18/2009 0.92 0.86 - 1.14 Final         Again, thank you for allowing me to participate in the care of your patient.      Sincerely,    ALTAGRACIA Figueroa CNP

## 2024-02-26 NOTE — PROGRESS NOTES
VASCULAR SURGERY PROGRESS NOTE    LOCATION: [facility] [place]     Alfredo Burnham  Medical Record #:  [id number]  YOB: 1955  Age:  68 year old     Date of Service: 2/26/2024    PRIMARY CARE PROVIDER: Clinic, [facility]    Reason for visit: Wound check and prevena exchange     IMPRESSION / RECOMMENDATION:   Alfredo Burnham is a very pleasant 68 years old gentleman who is s/p multiple washouts of LLE stump, closed on 2/2/24. Incision is healing well, no erythema, no swelling, no drainage. Prevena exchanged, incision 12cm long, no depth or width as it is closed.    Discussed with patient and his wife to keep Prevena on at all times. Do not allow the pump to be turned off at any time, should you have difficulty keeping Prevena pump on at all times, please do not hesitate to call vascular surgery immediately.    Plan to follow up next week for wound check and prevena exchange.      All questions were answered and support provided. He has our contact information and knows to reach out with any additional questions or concerns.     Miryam Carrasco [facility]  Vascular Surgery  Pager: [phone]  [email or web address]  Send message or 10 digit call back number Securely via Pulse 8 with the Pulse 8 Web Console (learn more here)        HPI:  Alfredo Burnham is a 68 year old male with past medical history significant for Left AKA revision, and has been recovering well.  Patient continues to work with physical therapy, he stands with cooking, LLE vision is without redness, no drainage, he is well-approximated.  No signs or symptoms of infection.    REVIEW OF SYSTEMS:    A 12 point ROS was reviewed and is negative except for what is listed above in HPI.    PHH:    Past Medical History:   Diagnosis Date    Hypertension 02/08/2011    Macular degeneration           Past Surgical History:   Procedure Laterality Date    AMPUTATE LEG ABOVE KNEE Left 10/17/2023    Procedure: AMPUTATION, ABOVE  KNEE and Abdominal wound vac exchange;  Surgeon: Ash Boucher MBBS;  [facility]    AMPUTATE REVISION STUMP LOWER EXTREMITY Left 12/8/2023    Procedure: REVISION, AMPUTATION STUMP, LEFT LOWER EXTREMITY;  Surgeon: Boris Lowe;  [facility]    AMPUTATE REVISION STUMP LOWER EXTREMITY Left 12/15/2023    Procedure: IRRIGATION AND DEBRIDEMENT OF LEFT AMPUTATION STUMP, WITH CEMENT ANTIBIOTIC BEAD INSERTION X3;  Surgeon: Boris Lowe;  [facility]    AMPUTATE REVISION STUMP LOWER EXTREMITY Left 2/2/2024    Procedure: Left Stump Revision with complex wound closure;  Surgeon: Boris Lowe MD;  [facility]    CLOSE SECONDARY WOUND ABDOMEN N/A 10/20/2023    Procedure: Delayed primary subcutaneous abdominal closure;  Surgeon: Boris Lowe;  [facility]    INCISION AND DRAINAGE ABDOMEN WASHOUT, COMBINED N/A 09/25/2023    Procedure: abdominal washout, combined, repacking,  abthera placement;  Surgeon: Ash Boucher MBBS;  [facility]    INCISION AND DRAINAGE ABDOMEN WASHOUT, COMBINED N/A 09/26/2023    Procedure: Abdominal washout, Retroperitoneal closure, washout left lower extremity wound;  Surgeon: Boris Lowe;  [facility]    INCISION AND DRAINAGE LOWER EXTREMITY, COMBINED Left 12/1/2023    Procedure: irrigation and drainage of collection left above knee amputation stump;  Surgeon: Ash Boucher MBBS;  [facility]    IR CVC NON TUNNEL LINE REMOVAL  10/18/2023    IR CVC TUNNEL PLACEMENT > 5 YRS OF AGE  10/18/2023    IR CVC TUNNEL REMOVAL RIGHT  11/02/2023    IR OR ANGIOGRAM  09/25/2023    IRRIGATION AND DEBRIDEMENT ABDOMEN WASHOUT, COMBINED N/A 10/02/2023    Procedure: Exploratory laparotomy, abominal closure, wound vac change left lower extremity;  Surgeon: Jonathan Fry MD;  [facility]    IRRIGATION AND DEBRIDEMENT ABDOMEN WASHOUT, COMBINED N/A 9/29/2023    Procedure: exploration of  ABDOMINAL CAVITY, placement of abthera;  Surgeon:  Boris Lowe MD;  [facility]    IRRIGATION AND DEBRIDEMENT LOWER EXTREMITY, COMBINED Left 9/29/2023    Procedure: exploration of left lower extremity, partial closure of left lower extremity, wound vac placement;  Surgeon: Boris Lowe MD;  [facility]    LAPAROTOMY EXPLORATORY N/A 09/25/2023    Procedure: Laparotomy exploratory;  Surgeon: Roger Shirley MD;  [facility]    PICC INSERTION - DOUBLE LUMEN Right 12/04/2023    43-1cm, Basilic vein    REPAIR ANEURYSM ABDOMINAL AORTA N/A 09/24/2023    Procedure: Resection of Ruptureed Abdominal Aneurysm, Aortic biliary bypass with 20 x 10 mm Bifurcated hemagard graft, Temporary Abdominal Closure, Endovascular Balloon Inclusion of Aorta;  Surgeon: Boris Lowe;  [facility]    SIGMOIDOSCOPY FLEXIBLE N/A 09/25/2023    Procedure: Sigmoidoscopy flexible;  Surgeon: Gabino Walker MD;  [facility]    THROMBECTOMY LOWER EXTREMITY Left 09/25/2023    Procedure: SFA, popliteal, and tibial thromboembolectomy and four compartment fasciotomy;  Surgeon: Ash Boucher MBBS;  [facility]       ALLERGIES:  Penicillins and Cefepime    MEDS:    Current Outpatient Medications:     amLODIPine (NORVASC) 10 MG tablet, Take 1 tablet (10 mg) by mouth daily for 360 days Hold for SBP<105, Disp: 90 tablet, Rfl: 3    apixaban ANTICOAGULANT (ELIQUIS) 5 MG tablet, Take 1 tablet (5 mg) by mouth 2 times daily for 90 days, Disp: 180 tablet, Rfl: 0    atorvastatin (LIPITOR) 10 MG tablet, Take 1 tablet (10 mg) by mouth every evening (Patient not taking: Reported on 2/12/2024), Disp: , Rfl:     camphor-menthol (DERMASARRA) 0.5-0.5 % external lotion, APPLY THIN LAYER TOPICALLY FOUR TIMES EVERY DAY AS NEEDED FOR ITCHING, Disp: , Rfl:     docusate sodium (COLACE) 50 MG capsule, Take 50 mg by mouth 2 times daily, Disp: , Rfl:     melatonin 3 MG tablet, Take 1 tablet (3 mg) by mouth every evening, Disp: , Rfl:     methocarbamol (ROBAXIN) 500 MG  tablet, Take 1 tablet (500 mg) by mouth every 8 hours for 14 days, Disp: 42 tablet, Rfl: 0    metoprolol tartrate (LOPRESSOR) 100 MG tablet, Take 1 tablet (100 mg) by mouth 2 times daily for 360 days Hold for SBP<100, Disp: 180 tablet, Rfl: 3    pantoprazole (PROTONIX) 40 MG EC tablet, Take 1 tablet (40 mg) by mouth every 24 hours for 360 days, Disp: 90 tablet, Rfl: 3    QUEtiapine (SEROQUEL) 25 MG tablet, Take 3 tablets (75 mg) by mouth at bedtime for 360 days (Patient not taking: Reported on 1/15/2024), Disp: 270 tablet, Rfl: 3    QUEtiapine (SEROQUEL) 25 MG tablet, Take 1 tablet (25 mg) by mouth 2 times daily as needed For anxiety (Patient not taking: Reported on 1/15/2024), Disp: 180 tablet, Rfl: 3    UNABLE TO FIND, MEDICATION NAME: [facility] - patient and spouse unsure of name, Disp: , Rfl:     Vitamin D3 (CHOLECALCIFEROL) 25 mcg (1000 units) tablet, Take 2 tablets (50 mcg) by mouth every 24 hours for 360 days, Disp: 180 tablet, Rfl: 3    SOCIAL HABITS:    History   Smoking Status    Former    Packs/day: 0.50    Years: 40.00    Types: Cigarettes    Quit date: 2023   Smokeless Tobacco    Not on file     Social History    Substance and Sexual Activity      Alcohol use: Not Currently        Comment: 1-2/wk      History   Drug Use Unknown       FAMILY HISTORY:    Family History   Problem Relation Age of Onset    Unknown/Adopted Mother     Heart Disease Mother     Arthritis Mother     Hypertension Brother     Blood Disease Sister     Psychotic Disorder Sister        PE:  BP (!) 158/88 (BP Location: Right arm, Patient Position: Sitting, Cuff Size: Adult Regular)   Pulse 76   SpO2 99%   Wt Readings from Last 1 Encounters:   02/02/24 56.3 kg (124 lb 1.9 oz)     There is no height or weight on file to calculate BMI.    EXAM:  GENERAL: well-developed 68 year old male who appears his stated age  CARDIAC: normal   CHEST/LUNG: normal respiratory effort   MUSCULOSKELETAL: grossly normal and both  lower extremities are intact, no lower extremity edema  NEUROLOGIC: focally intact, alert and oriented x 3  PSYCH: appropriate affect  SKIN: Sutures intact, no drainage, no signs or symptoms of infection.                 LABS:      Sodium   Date Value Ref Range Status   02/05/2024 138 135 - 145 mmol/L Final     Comment:     Reference intervals for this test were updated on 09/26/2023 to more accurately reflect our healthy population. There may be differences in the flagging of prior results with similar values performed with this method. Interpretation of those prior results can be made in the context of the updated reference intervals.    02/04/2024 137 135 - 145 mmol/L Final     Comment:     Reference intervals for this test were updated on 09/26/2023 to more accurately reflect our healthy population. There may be differences in the flagging of prior results with similar values performed with this method. Interpretation of those prior results can be made in the context of the updated reference intervals.    02/03/2024 136 135 - 145 mmol/L Final     Comment:     Reference intervals for this test were updated on 09/26/2023 to more accurately reflect our healthy population. There may be differences in the flagging of prior results with similar values performed with this method. Interpretation of those prior results can be made in the context of the updated reference intervals.    02/08/2011 138 133 - 144 mmol/L Final   02/18/2009 139 133 - 144 mmol/L Final     Urea Nitrogen   Date Value Ref Range Status   02/05/2024 36.0 (H) 8.0 - 23.0 mg/dL Final   02/04/2024 33.5 (H) 8.0 - 23.0 mg/dL Final   02/03/2024 30.3 (H) 8.0 - 23.0 mg/dL Final   02/08/2011 17 7 - 30 mg/dL Final   02/18/2009 14 7 - 30 mg/dL Final     Hemoglobin   Date Value Ref Range Status   02/05/2024 9.5 (L) 13.3 - 17.7 g/dL Final   02/04/2024 10.0 (L) 13.3 - 17.7 g/dL Final   02/03/2024 9.0 (L) 13.3 - 17.7 g/dL Final   02/18/2009 15.1 13.3 - 17.7 g/dL  Final     Platelet Count   Date Value Ref Range Status   02/05/2024 96 (L) 150 - 450 10e3/uL Final   02/04/2024 100 (L) 150 - 450 10e3/uL Final   02/03/2024 87 (L) 150 - 450 10e3/uL Final   02/18/2009 157 150 - 450 10e9/L Final     INR   Date Value Ref Range Status   12/06/2023 1.27 (H) 0.85 - 1.15 Final   10/25/2023 1.24 (H) 0.85 - 1.15 Final   10/25/2023 1.24 (H) 0.85 - 1.15 Final   10/15/2009 0.9  Final   02/18/2009 0.92 0.86 - 1.14 Final

## 2024-02-26 NOTE — PATIENT INSTRUCTIONS
Thank you so much for choosing us for your care. It was a pleasure to see you at the vascular clinic today.     Follow-up recommendations:     Please keep Prevena dressing on at all times, do not allow pump to be turned off at any time.  Should you have any difficulty with maintaining the Prevena pump, please call vascular surgery immediately.    During this visit we also sent a prescription to your pharmacy, please call us should you have any difficulty picking up the prescription or should you have any new reactions.  You have taken this medication while inpatient previously and did not have any reactions, however should you develop new symptoms, please call vascular surgery immediately.    We also have the next follow-up scheduled with us on 3/5/2024, we look forward to seeing you.           Our scheduling team will get in touch with you to set up any follow-up testing/imaging and/or appointments. Please be aware that any testing/imaging recommended today will need to completed prior to your next visit with the provider. If testing/imaging is not completed prior to your next visit, your visit may be rescheduled.        If you have any questions, please contact our clinic directly at [phone] and ask for the nurse. We also encourage the use of [employer] to communicate with your HealthCare Provider.        If you have an urgent need after business hours (8:00 am to 4:30 pm) please call [phone], option 4, and ask for the vascular attending on call. For non-urgent after hours needs, please call the vascular nurse at [phone] and leave a detailed voicemail. For scheduling needs, please call our clinic directly at [phone].

## 2024-02-28 ENCOUNTER — PATIENT OUTREACH (OUTPATIENT)
Dept: CARE COORDINATION | Facility: CLINIC | Age: 69
End: 2024-02-28
Payer: COMMERCIAL

## 2024-02-28 NOTE — ADDENDUM NOTE
Addended by: KY YOON on: 2/28/2024 06:57 AM     Modules accepted: Orders     · He is having moderate depression, which in part may be related to his headaches and memory loss. Treatment recommended for headaches. Will follow.

## 2024-02-28 NOTE — PROGRESS NOTES
Social Work - Intervention  [facility]  Data/Intervention:    Patient Name: Alfredo Burnham Goes By: Alfredo CRAWFORD/Age: 1955 (68 year old)     Visit Type: telephone  Referral Source: Vascular surgery  Reason for Referral: Patient has had difficulty with VAC umps returns and financial implications, address community resources while at home     Collaborated With:    -Nelson's wife Tabby- [phone]  -Murali with [employer]/ wound vacs- [phone]     Psychosocial Information/Concerns:  Referral received indicating the above.      Intervention/Education/Resources Provided:  I contacted Nleson to introduce myself and assess how I can be of help. His wife Tabby answered and said Nelson was with his PT, but almost done. Tabby said she would give my number to Nelson and have him call me when it works best for him. Tabby explained one thing she knows of needing help with is figuring out why they keep getting a bill for the wound vac pump because they thought that was covered.   I explained I will see what I can find out about the wound vac and will wait for Nelson to call me back.     I contacted Murali with [employer]/ wound vacs- explained the situation and asked for a call back to clarify things, waiting to hear back.      Assessment/Plan:  I will await a return call from Nelson and provide further assistance at that time.   I will also await a call from Murali or someone with [employer] wound care.      Provided patient/family with contact information and availability.    BONY Biswas, [facility]    [facility]  Ph: [phone], Pgr: [phone]  2024

## 2024-02-29 ENCOUNTER — TELEPHONE (OUTPATIENT)
Dept: ANESTHESIOLOGY | Facility: CLINIC | Age: 69
End: 2024-02-29
Payer: COMMERCIAL

## 2024-02-29 ENCOUNTER — VIRTUAL VISIT (OUTPATIENT)
Dept: CT IMAGING | Facility: CLINIC | Age: 69
End: 2024-02-29
Attending: STUDENT IN AN ORGANIZED HEALTH CARE EDUCATION/TRAINING PROGRAM
Payer: COMMERCIAL

## 2024-02-29 VITALS — WEIGHT: 130 LBS | BODY MASS INDEX: 20.4 KG/M2 | HEIGHT: 67 IN

## 2024-02-29 DIAGNOSIS — T87.40 INFECTION OF AMPUTATION STUMP (H): Primary | ICD-10-CM

## 2024-02-29 PROCEDURE — 99443 PR PHYSICIAN TELEPHONE EVALUATION 21-30 MIN: CPT | Mod: 24 | Performed by: STUDENT IN AN ORGANIZED HEALTH CARE EDUCATION/TRAINING PROGRAM

## 2024-02-29 NOTE — PROGRESS NOTES
Virtual Visit Details    Type of service:  telephone Visit (video visit is converted to telephone visit due to technical issue at patient's end)  Start Time:  9:46AM  End Time: 9:57AM    Originating Location (pt. Location): Home    Distant Location (provider location):  Off-site  Platform used for Video Visit: Telephone    ================================================    [facility] INFECTIOUS [facility]  [address]  [address]  [facility] [address]  Phone: [phone]  Fax: [phone]    Patient:  Alfredo Burnham, Date of birth 1955  Date of Visit:  02/29/2024  Referring Provider Nidia Garcia MD  Reason for visit: Post hospital discharge follow up regarding LLE stump infection.      Assessment & Plan      ID Problem List:   Left AKA stump infection s/p I&D 12/1/2023, I&D w/ vac in place 12/8/2023, I&D 12/15/23 with gentamicin beads placed   S/p wound closure 2/2/23. 2 of 3 gentamicin beads were left in place because they were covered with granulation tissue.   Intra-op cxs 12/8 - from tissue, Enterococcus faecium VRE, Staph epidermidis and from bone - VRE (from 1 out of 2 specimens, which is labeled as proximal). Likely plan for another debridement.    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 Last debridement 12/15/23 with gentamicin beads placed, wound vac in place. Then pathology did not show osteomyelitis, intra op cultures remained negative.  Patient underwent incisional closure with vascular sugery 2/2/24. Everything appeared healthy and no new cultures were obtained. Two gentamicin beads were left in place due to coverage with granulation tissue and there is no plan to remove them. The concern remains that the cement beads will stay in place as foreign bodies. Since they were antibiotic impregnated and placed during 12/15 surgery when cultures were negative, they hopefully should not have been colonized with VRE. However, if his infection recurs he would need lifelong suppression as long as the beads stay in place.  Since had received 7-8 weeks of iv Daptomycin, and stump appeared healthy, recommended to stop antibiotics as per inpatient ID evaluation.     Since then, reportedly well healing LLE stump. Unable to examine given telephone visit today. Reviewed pictures uploaded on recent Vascular surgery clinic visit on 2/26. Still has sutures in place, and will likely be removed during upcoming vascular surgery clinic visit. Recommend to remain off of antibiotic and continue local stump care until heals completely. If any concerns of infection, will need ID assessment/management.     Recommendations:   Continue to stay off of antibiotic  Continue local wound care at LLE stump site to avoid local infection   If any signs of stump infection in future, please refer to ID clinic.   Please note that if infection recurs then would need lifelong antibiotic suppression as long as beads stay in place.   Okay to start statin - does not have cross reactivity with the gentamicin (coated cement beads that in place locally at the stump) as per lexicomp drug-drug interactions. This is also addressed with vascular surgery team as inquired via in-basket message.   ID clinic follow up as needed.    Total 40 minutes spent by me on the date of the encounter doing chart review, history and exam, documentation and further activities per the note.        History of Present  "Illness     Pertinent history obtain from: chart review and the patient  No fever, chills, sweating. No issue at stump site, still has wound vac in place. [facility] and [facility] to start statin, wondering if that interacts with gentamicin coated cement beads which still in place     Specialty Problems          Infectious Diseases    Infection following a procedure, deep incisional surgical site, initial encounter            Physical Exam     Vital signs:  Ht 1.702 m (5' 7\")   Wt 59 kg (130 lb)   BMI 20.36 kg/m      Deferred due to telephone visit    Data   Laboratory data and imaging listed below was reviewed prior to this encounter.     Microbiology:    Culture   Date Value Ref Range Status   12/15/2023 No anaerobic organisms isolated  Final   12/15/2023 No Growth  Final   12/15/2023 No Growth  Final   12/15/2023 No anaerobic organisms isolated  Final   12/15/2023 No Growth  Final   12/15/2023 No Growth  Final   12/15/2023 No anaerobic organisms isolated  Final   12/15/2023 No Growth  Final   12/15/2023 No Growth  Final   12/15/2023 No anaerobic organisms isolated  Final   12/15/2023 No Growth  Final   12/15/2023 No Growth  Final   12/08/2023 No anaerobic organisms isolated  Final   12/08/2023 No Growth  Final   12/08/2023 1+ Enterococcus faecium VRE (A)  Final     Comment:     Susceptibilities done on previous cultures   12/08/2023 No anaerobic organisms isolated  Final   12/08/2023 Isolated in broth only Enterococcus faecium VRE (A)  Final     Comment:     On day 3 of incubation  Not isolated or reported on routine aerobic culture  Susceptibilities done on previous cultures   12/08/2023 No Growth  Final   12/08/2023 No Growth  Final   12/08/2023 No anaerobic organisms isolated  Final   12/08/2023 No Growth  Final   12/08/2023 Isolated in broth only Enterococcus faecium VRE (A)  Corrected     Comment:     On day 2 of incubation  Susceptibilities done on previous cultures  Corrected " result: Previously reported as Enterococcus avium (VRE) on 12/12/2023 at  7:29 AM CST.   12/08/2023 No anaerobic organisms isolated  Final   12/08/2023 No Growth  Final   12/08/2023 2+ Enterococcus faecium VRE (A)  Final   12/08/2023 2+ Staphylococcus epidermidis (A)  Final     Comment:     Susceptibilities not routinely done, refer to antibiogram to view typical susceptibility profiles   12/01/2023 No anaerobic organisms isolated  Final   12/01/2023 1+ Enterococcus faecium VRE (A)  Final     Comment:     Susceptibilities done on previous cultures   11/29/2023 1+ Enterococcus faecium VRE (A)  Final   11/29/2023 No Growth  Final   11/29/2023 No Growth  Final   10/25/2023 No Growth  Final   10/25/2023 No Growth  Final   10/17/2023 2+ Staphylococcus epidermidis (A)  Final     Comment:     Susceptibilities not routinely done, refer to antibiogram to view typical susceptibility profiles   10/17/2023 1+ Candida albicans (A)  Final     Comment:     Susceptibilities not routinely done, refer to antibiogram to view typical susceptibility profiles   10/14/2023 No Growth  Final   10/14/2023 No Growth  Final   10/09/2023 No Growth  Final   10/09/2023 No Growth  Final   10/01/2023 2+ Candida albicans (A)  Final     Comment:     Susceptibilities not routinely done, refer to antibiogram to view typical susceptibility profiles   10/01/2023 2+ Debbi krusei (A)  Final     Comment:     Susceptibilities not routinely done, refer to antibiogram to view typical susceptibility profiles   10/01/2023 1+ Normal hakan  Final   10/01/2023 No Growth  Final   10/01/2023 No Growth  Final   09/28/2023 3+ Candida albicans (A)  Final     Comment:     Susceptibilities not routinely done, refer to antibiogram to view typical susceptibility profiles   09/28/2023 1+ Aspergillus fumigatus complex (A)  Final   09/28/2023 3+ Normal hakan  Final     Urine Studies:    Recent Labs   Lab Test 10/01/23  1049 09/30/23  1029 09/30/23  0648   LEUKEST Negative  Negative Negative   WBCU 7* 7* 12*     Hematology Studies:    Recent Labs   Lab Test 02/05/24  0641 02/04/24  0659 02/03/24  0707 01/31/24  1030 01/24/24  1030 01/19/24  1030 10/27/23  1255 10/27/23  0548 10/26/23  1051 10/26/23  0700 10/24/23  0756 10/24/23  0651 10/23/23  0719 10/23/23  0555 10/22/23  1309 10/22/23  0422 10/21/23  0354   WBC 6.9 6.8 7.3 9.8 6.2 7.0   < > 11.5*   < > 13.2*   < > 12.4*  --  12.6*  --  11.3* 13.9*   ANEU  --   --   --   --   --   --   --  8.1  --  9.4*  --  9.5*  --  9.3*  --  8.4* 9.3*   AEOS  --   --   --   --   --   --   --  3.0*  --  2.4*  --  1.9*  --  2.4*  --  2.0* 1.8*   HGB 9.5* 10.0* 9.0* 10.5* 9.4* 9.5*   < > 8.4*   < > 8.4*   < > 5.8*   < > 6.8*   < > 7.1* 8.0*   * 99 98 97 96 96   < > 94   < > 96   < > 95  --  96  --  94 90   PLT 96* 100* 87* 119* 111* 136*   < > 124*   < > 128*   < > 142*  --  185  --  167 159    < > = values in this interval not displayed.      Metabolic Studies:   Recent Labs   Lab Test 02/05/24  0641 02/04/24  0659 02/03/24  0707 01/31/24  1030 01/24/24  1030    137 136 139 139   POTASSIUM 4.3 4.2 3.8 3.9 3.9   CHLORIDE 116* 108* 108* 108* 109*   CO2 20* 19* 19* 18* 18*   BUN 36.0* 33.5* 30.3* 31.8* 36.0*   CR 1.75* 1.63* 1.61* 1.99* 2.06*   GFRESTIMATED 41* 45* 46* 35* 34*     Hepatic Studies:   Recent Labs   Lab Test 01/10/24  1711 12/29/23  1127 12/08/23  0732 10/30/23  0651 10/29/23  0401 10/28/23  0525   BILITOTAL <0.2 0.2 0.3 0.5 0.4 0.5   ALKPHOS 75 79 71 132* 128 133*   ALBUMIN 3.5 3.9 3.5 3.1* 3.0* 3.1*   AST 17 19 18 24 25 34   ALT 16 21 16 15 19 25     Left femur proximal bone biopsy 12/15/2023:  -Small fragments of benign bone with no definite evidence of osteomyelitis.

## 2024-02-29 NOTE — NURSING NOTE
Patient confirms medications and allergies are accurate via patients echeck in completion, and or denies any changes since last reviewed/verified.     Is the patient currently in the state of [place]? YES    Visit mode:VIDEO    If the visit is dropped, the patient can be reconnected by: VIDEO VISIT: Text to cell phone:   [phone]       Will anyone else be joining the visit? Wife Tabby  (If patient encounters technical issues they should call [phone]    How would you like to obtain your AVS? MyChart    Are changes needed to the allergy or medication list? No    Reason for visit: RECHECK (Follow up, surgery Feb 2024)    Ana CHILD

## 2024-02-29 NOTE — TELEPHONE ENCOUNTER
Offered an appointment with Dr. Sharpe on this date 3/1 & time 2pm at this location [facility]. Patient was unable to accept and asked to be removed from the waitlist.

## 2024-03-04 ENCOUNTER — PATIENT OUTREACH (OUTPATIENT)
Dept: CARE COORDINATION | Facility: CLINIC | Age: 69
End: 2024-03-04
Payer: COMMERCIAL

## 2024-03-04 NOTE — PROGRESS NOTES
Social Work - Follow-Up  [facility]    Data/Intervention:    Patient Name: Alfredo Burnham Goes By: Alfredo    /Age: 1955 (68 year old)    Reason for Follow-Up:  Wound vac bill questions    Collaborated With:    -Sameer with [address] [place] [address]- [phone]  -Alfredo- [phone]    Intervention/Education/Resources Provided:  I contacted Sameer with [address] [place] [address] and explained trying to get information as to why Alfredo is getting billed for a wound vac. Sameer looked through Alfredo' records and noted he is not in the billing department so won't be able to completely weigh in on what is going on. Sameer explained that it appears to either be a lack of documentation issue (showing that there is an open wound and showing the need for the wound vac), or it could be a DME copay, which would be an out of pocket expense if Alfredo doesn't have another insurance to pick this up. Sameer explained he will forward Alfredo' case to a colleague of his who can do more investigating to see what exactly is going on. Sameer will ask that I be updated as to what the issue is, and he will ask that if it is a DME copay or something Alfredo truly owes, someone from the billing office will be asked to call Alfredo and explained the invoices/bills he has received. Sameer did explain that if it is a true bill and if Alfredo cannot afford this or is a financial hardship he should call [phone] to explain this and discuss options.     I contacted Alfredo and left a vm noting that I have some of an update, and asked for a call back.     Assessment/Plan:  I will await a call back from Alfredo and provide assistance at that time.     Previously provided patient/family with writer's contact information and availability.      BONY Biswas, [facility]    [facility]  Ph: [phone], Pgr: [phone]  3/4/2024

## 2024-03-05 ENCOUNTER — OFFICE VISIT (OUTPATIENT)
Dept: VASCULAR SURGERY | Facility: CLINIC | Age: 69
End: 2024-03-05
Payer: COMMERCIAL

## 2024-03-05 VITALS — HEART RATE: 74 BPM | SYSTOLIC BLOOD PRESSURE: 151 MMHG | OXYGEN SATURATION: 98 % | DIASTOLIC BLOOD PRESSURE: 89 MMHG

## 2024-03-05 DIAGNOSIS — Z89.612 S/P AKA (ABOVE KNEE AMPUTATION) UNILATERAL, LEFT (H): Primary | ICD-10-CM

## 2024-03-05 DIAGNOSIS — S81.802D OPEN WOUND OF LEFT LOWER EXTREMITY, SUBSEQUENT ENCOUNTER: ICD-10-CM

## 2024-03-05 PROCEDURE — 99024 POSTOP FOLLOW-UP VISIT: CPT | Performed by: NURSE PRACTITIONER

## 2024-03-05 NOTE — PROGRESS NOTES
VASCULAR SURGERY PROGRESS NOTE    LOCATION: [facility]     Alfredo Burnham  Medical Record #:  [id number]  YOB: 1955  Age:  68 year old     Date of Service: 3/5/2024    PRIMARY CARE PROVIDER: Clinic, [facility]    Reason for visit: Wound check and Prevena placement     IMPRESSION / RECOMMENDATION:   Alfredo Burnham is a very pleasant 68-year-old gentleman who presents to clinic for LLE stump incision assessment and Prevena exchange. During this visit I performed meticulous wound care and cleansing, new Prevena dressing was applied. LLE stump incision is healing well, with multiple segments of completely closed skin. Given excellent recovery and progress, we will plan for potentially removal of sutures and discontinuation of Prevena on 3/11/2024.    Patient and his family know and verbalized clear understanding and the importance to keep Prevena on at all times. If Prevena turns off accidentally wore batteries runoff, please call vascular surgery immediately. They know to call vascular surgery if worsening drainage from AKA incision is noted in Prevena canister.   Please call vascular surgery if new fevers or chills, redness, new pain at incision site are developed.     Follow-up on 3/11/2024 for potential suture removal and discontinuation of Prevena.    All questions were answered and support provided. He has our contact information and knows to reach out with any additional questions or concerns.     Miryam Carrasco, [facility]  Vascular Surgery  Pager: [phone]  [email or web address]  Send message or 10 digit call back number Securely via [employer] with the [employer] Web Console (learn more here)        HPI:  Patient presents with wife Tabby at follow-up visit for LLE incision/wound assessment and Prevena exchange. He is stump incision is healing very well, with several segments of completely closed skin. No drainage noted in canister.  Site without signs or symptoms of  infection. His sutures are now loose and will likely be ready for removal next week which will be 5+ weeks from his last surgery. Denies fevers chills,  has reached out to them given previous difficulty with return of VAC, however patient has not had the opportunity to return her call yet. He is pending pain clinic visit for phantom limb pain on 3/12/2024. Patient is also planning to travel to [place] after his visit with vascular surgeon, Dr. Lowe.     REVIEW OF SYSTEMS:    A 12 point ROS was reviewed and is negative except for what is listed above in HPI.    PHH:    Past Medical History:   Diagnosis Date     Hypertension 02/08/2011     Macular degeneration           Past Surgical History:   Procedure Laterality Date     AMPUTATE LEG ABOVE KNEE Left 10/17/2023    Procedure: AMPUTATION, ABOVE KNEE and Abdominal wound vac exchange;  Surgeon: Ash Boucher MBBS;  Location: [facility]     AMPUTATE REVISION STUMP LOWER EXTREMITY Left 12/8/2023    Procedure: REVISION, AMPUTATION STUMP, LEFT LOWER EXTREMITY;  Surgeon: Boris Lowe;  Location: [facility] OR     AMPUTATE REVISION STUMP LOWER EXTREMITY Left 12/15/2023    Procedure: IRRIGATION AND DEBRIDEMENT OF LEFT AMPUTATION STUMP, WITH CEMENT ANTIBIOTIC BEAD INSERTION X3;  Surgeon: Boris Lowe;  Location: [facility] OR     AMPUTATE REVISION STUMP LOWER EXTREMITY Left 2/2/2024    Procedure: Left Stump Revision with complex wound closure;  Surgeon: Boris Lowe MD;  Location: [facility] OR     CLOSE SECONDARY WOUND ABDOMEN N/A 10/20/2023    Procedure: Delayed primary subcutaneous abdominal closure;  Surgeon: Boris Lowe;  Location: [facility]     INCISION AND DRAINAGE ABDOMEN WASHOUT, COMBINED N/A 09/25/2023    Procedure: abdominal washout, combined, repacking,  abthera placement;  Surgeon: Ash Boucher MBBS;  Location: [facility]     INCISION AND DRAINAGE ABDOMEN WASHOUT, COMBINED N/A 09/26/2023    Procedure: Abdominal washout, Retroperitoneal  closure, washout left lower extremity wound;  Surgeon: Boris Lowe;  Location: [facility]     INCISION AND DRAINAGE LOWER EXTREMITY, COMBINED Left 12/1/2023    Procedure: irrigation and drainage of collection left above knee amputation stump;  Surgeon: Ash Boucher MBBS;  Location: [facility]     IR CVC NON TUNNEL LINE REMOVAL  10/18/2023     IR CVC TUNNEL PLACEMENT > 5 YRS OF AGE  10/18/2023     IR CVC TUNNEL REMOVAL RIGHT  11/02/2023     IR OR ANGIOGRAM  09/25/2023     IRRIGATION AND DEBRIDEMENT ABDOMEN WASHOUT, COMBINED N/A 10/02/2023    Procedure: Exploratory laparotomy, abominal closure, wound vac change left lower extremity;  Surgeon: Jonathan Fry MD;  [facility]     IRRIGATION AND DEBRIDEMENT ABDOMEN WASHOUT, COMBINED N/A 9/29/2023    Procedure: exploration of  ABDOMINAL CAVITY, placement of abthera;  Surgeon: Boris Lowe MD;  [facility]     IRRIGATION AND DEBRIDEMENT LOWER EXTREMITY, COMBINED Left 9/29/2023    Procedure: exploration of left lower extremity, partial closure of left lower extremity, wound vac placement;  Surgeon: Boris Lowe MD;  [facility]     LAPAROTOMY EXPLORATORY N/A 09/25/2023    Procedure: Laparotomy exploratory;  Surgeon: Roger Shirley MD;  Location: [facility]     PICC INSERTION - DOUBLE LUMEN Right 12/04/2023    43-1cm, Basilic vein     REPAIR ANEURYSM ABDOMINAL AORTA N/A 09/24/2023    Procedure: Resection of Ruptureed Abdominal Aneurysm, Aortic biliary bypass with 20 x 10 mm Bifurcated hemagard graft, Temporary Abdominal Closure, Endovascular Balloon Inclusion of Aorta;  Surgeon: Boris Lowe;  Location: [facility]     SIGMOIDOSCOPY FLEXIBLE N/A 09/25/2023    Procedure: Sigmoidoscopy flexible;  Surgeon: Gabino Walker MD;  [facility]: [facility]     THROMBECTOMY LOWER EXTREMITY Left 09/25/2023    Procedure: SFA, popliteal, and tibial thromboembolectomy and four compartment fasciotomy;  Surgeon: Ash Boucher MBBS;  Location: [facility]        ALLERGIES:  Penicillins and Cefepime    MEDS:    Current Outpatient Medications:      amLODIPine (NORVASC) 10 MG tablet, Take 1 tablet (10 mg) by mouth daily for 360 days Hold for SBP<105, Disp: 90 tablet, Rfl: 3     apixaban ANTICOAGULANT (ELIQUIS) 5 MG tablet, Take 1 tablet (5 mg) by mouth 2 times daily for 90 days, Disp: 180 tablet, Rfl: 0     atorvastatin (LIPITOR) 10 MG tablet, Take 1 tablet (10 mg) by mouth every evening, Disp: , Rfl:      camphor-menthol (DERMASARRA) 0.5-0.5 % external lotion, APPLY THIN LAYER TOPICALLY FOUR TIMES EVERY DAY AS NEEDED FOR ITCHING, Disp: , Rfl:      docusate sodium (COLACE) 50 MG capsule, Take 50 mg by mouth 2 times daily, Disp: , Rfl:      melatonin 3 MG tablet, Take 1 tablet (3 mg) by mouth every evening, Disp: , Rfl:      methocarbamol (ROBAXIN) 500 MG tablet, Take 1 tablet (500 mg) by mouth every 8 hours, Disp: 42 tablet, Rfl: 0     metoprolol tartrate (LOPRESSOR) 100 MG tablet, Take 1 tablet (100 mg) by mouth 2 times daily for 360 days Hold for SBP<100, Disp: 180 tablet, Rfl: 3     pantoprazole (PROTONIX) 40 MG EC tablet, Take 1 tablet (40 mg) by mouth every 24 hours for 360 days, Disp: 90 tablet, Rfl: 3     QUEtiapine (SEROQUEL) 25 MG tablet, Take 3 tablets (75 mg) by mouth at bedtime for 360 days (Patient not taking: Reported on 2/29/2024), Disp: 270 tablet, Rfl: 3     QUEtiapine (SEROQUEL) 25 MG tablet, Take 1 tablet (25 mg) by mouth 2 times daily as needed For anxiety (Patient not taking: Reported on 2/29/2024), Disp: 180 tablet, Rfl: 3     UNABLE TO FIND, MEDICATION NAME: [facility] - [facility] and spouse unsure of name, Disp: , Rfl:      Vitamin D3 (CHOLECALCIFEROL) 25 mcg (1000 units) tablet, Take 2 tablets (50 mcg) by mouth every 24 hours for 360 days, Disp: 180 tablet, Rfl: 3    SOCIAL HABITS:    History   Smoking Status     Former     Packs/day: 0.50     Years: 40.00     Types: Cigarettes     Quit date: 2023   Smokeless Tobacco     Not on  file     Social History    Substance and Sexual Activity      Alcohol use: Not Currently        Comment: 1-2/wk      History   Drug Use Unknown       FAMILY HISTORY:    Family History   Problem Relation Age of Onset     Unknown/Adopted Mother      Heart Disease Mother      Arthritis Mother      Hypertension Brother      Blood Disease Sister      Psychotic Disorder Sister        PE:  BP (!) 151/89 (BP Location: Right arm, Patient Position: Sitting, Cuff Size: Adult Regular)   Pulse 74   SpO2 98%   Wt Readings from Last 1 Encounters:   02/29/24 59 kg (130 lb)     There is no height or weight on file to calculate BMI.    EXAM:  GENERAL: well-developed 68 year old male who appears his stated age  CARDIAC: normal   CHEST/LUNG: normal respiratory effort   MUSCULOSKELETAL: grossly normal and both lower extremities are intact, no lower extremity edema  NEUROLOGIC: focally intact, alert and oriented x 3  PSYCH: appropriate affect  VASCULAR: LLE incision is healing very well, skin completely closed and mostly healed.  No swelling, no drainage, no signs or symptoms of infection.

## 2024-03-05 NOTE — LETTER
3/5/2024       RE: Alfredo Brunham  [address]  [place] [address]     Dear Colleague,    Thank you for referring your patient, Alfredo Burnham, to the [facility]ASCULAR [facility]IN[facility] [place] at [facility]. Please see a copy of my visit note below.        VASCULAR SURGERY PROGRESS NOTE    LOCATION: [facility]     Alfredo Burnham  Medical Record #:  [id number]  YOB: 1955  Age:  68 year old     Date of Service: 3/5/2024    PRIMARY CARE PROVIDER: Clinic, [facility]    Reason for visit: Wound check and Prevena placement     IMPRESSION / RECOMMENDATION:   Alfredo Burnham is a very pleasant 68-year-old gentleman who presents to clinic for LLE stump incision assessment and Prevena exchange. During this visit I performed meticulous wound care and cleansing, new Prevena dressing was applied. LLE stump incision is healing well, with multiple segments of completely closed skin. Given excellent recovery and progress, we will plan for potentially removal of sutures and discontinuation of Prevena on 3/11/2024.    Patient and his family know and verbalized clear understanding and the importance to keep Prevena on at all times. If Prevena turns off accidentally wore batteries runoff, please call vascular surgery immediately. They know to call vascular surgery if worsening drainage from AKA incision is noted in Prevena canister.   Please call vascular surgery if new fevers or chills, redness, new pain at incision site are developed.     Follow-up on 3/11/2024 for potential suture removal and discontinuation of Prevena.    All questions were answered and support provided. He has our contact information and knows to reach out with any additional questions or concerns.     Miryam Carrasco, CNP  Vascular Surgery  Pager: [phone]  [email or web address]  Send message or 10 digit call back number Securely via Flywheel  with the Vocera Web Console (learn more here)        HPI:  Patient presents with wife Tabby at follow-up visit for LLE incision/wound assessment and Prevena exchange. He is stump incision is healing very well, with several segments of completely closed skin. No drainage noted in canister.  Site without signs or symptoms of infection. His sutures are now loose and will likely be ready for removal next week which will be 5+ weeks from his last surgery. Denies fevers chills,  has reached out to them given previous difficulty with return of VAC, however patient has not had the opportunity to return her call yet. He is pending pain clinic visit for phantom limb pain on 3/12/2024. Patient is also planning to travel to [place] after his visit with vascular surgeon, Dr. Lowe.     REVIEW OF SYSTEMS:    A 12 point ROS was reviewed and is negative except for what is listed above in HPI.    PHH:    Past Medical History:   Diagnosis Date    Hypertension 02/08/2011    Macular degeneration           Past Surgical History:   Procedure Laterality Date    AMPUTATE LEG ABOVE KNEE Left 10/17/2023    Procedure: AMPUTATION, ABOVE KNEE and Abdominal wound vac exchange;  Surgeon: Ash Boucher MBBS;  [facility]    AMPUTATE REVISION STUMP LOWER EXTREMITY Left 12/8/2023    Procedure: REVISION, AMPUTATION STUMP, LEFT LOWER EXTREMITY;  Surgeon: Boris Lowe;  [facility]    AMPUTATE REVISION STUMP LOWER EXTREMITY Left 12/15/2023    Procedure: IRRIGATION AND DEBRIDEMENT OF LEFT AMPUTATION STUMP, WITH CEMENT ANTIBIOTIC BEAD INSERTION X3;  Surgeon: Boris Lowe;  [facility]    AMPUTATE REVISION STUMP LOWER EXTREMITY Left 2/2/2024    Procedure: Left Stump Revision with complex wound closure;  Surgeon: Boris Lowe MD;  [facility]    CLOSE SECONDARY WOUND ABDOMEN N/A 10/20/2023    Procedure: Delayed primary subcutaneous abdominal closure;  Surgeon: Boris Lowe;  [facility]    INCISION AND DRAINAGE ABDOMEN WASHOUT, COMBINED N/A 09/25/2023    Procedure: abdominal washout, combined, repacking,  abthera placement;  Surgeon: Ash Boucher MBBS;  [facility]    INCISION AND DRAINAGE ABDOMEN WASHOUT, COMBINED N/A 09/26/2023    Procedure: Abdominal washout, Retroperitoneal closure, washout left lower extremity wound;  Surgeon: Boris Lowe;  [facility]    INCISION AND DRAINAGE LOWER EXTREMITY, COMBINED Left 12/1/2023    Procedure: irrigation and drainage of collection left above knee amputation stump;  Surgeon: Ash Boucher MBBS;  [facility]    IR CVC NON TUNNEL LINE REMOVAL  10/18/2023    IR CVC TUNNEL PLACEMENT > 5 YRS OF AGE  10/18/2023    IR CVC TUNNEL REMOVAL RIGHT  11/02/2023    IR OR ANGIOGRAM  09/25/2023    IRRIGATION AND DEBRIDEMENT ABDOMEN WASHOUT, COMBINED N/A 10/02/2023    Procedure: Exploratory laparotomy, abominal closure, wound vac change left lower extremity;  Surgeon: Jonathan Fry MD;  [facility]    IRRIGATION AND DEBRIDEMENT ABDOMEN WASHOUT, COMBINED N/A 9/29/2023    Procedure: exploration of  ABDOMINAL CAVITY, placement of abthera;  Surgeon: Boris Lowe MD;  [facility]    IRRIGATION AND DEBRIDEMENT LOWER EXTREMITY, COMBINED Left 9/29/2023    Procedure: exploration of left lower extremity, partial closure of left lower extremity, wound vac placement;  Surgeon: Boris Lowe MD;  [facility]    LAPAROTOMY EXPLORATORY N/A 09/25/2023    Procedure: Laparotomy exploratory;  Surgeon: Roger Shirley MD;  [facility]    PICC INSERTION - DOUBLE LUMEN Right 12/04/2023    43-1cm, Basilic vein    REPAIR ANEURYSM ABDOMINAL AORTA N/A 09/24/2023    Procedure: Resection of Ruptureed Abdominal Aneurysm, Aortic biliary bypass with 20 x 10 mm Bifurcated hemagard graft, Temporary Abdominal Closure, Endovascular Balloon Inclusion of Aorta;  Surgeon: Boris Lowe;  [facility]    SIGMOIDOSCOPY FLEXIBLE N/A  09/25/2023    Procedure: Sigmoidoscopy flexible;  Surgeon: Gabino Walker MD;  [facility]    THROMBECTOMY LOWER EXTREMITY Left 09/25/2023    Procedure: SFA, popliteal, and tibial thromboembolectomy and four compartment fasciotomy;  Surgeon: Ash Boucher MBBS;  [facility]       ALLERGIES:  Penicillins and Cefepime    MEDS:    Current Outpatient Medications:     amLODIPine (NORVASC) 10 MG tablet, Take 1 tablet (10 mg) by mouth daily for 360 days Hold for SBP<105, Disp: 90 tablet, Rfl: 3    apixaban ANTICOAGULANT (ELIQUIS) 5 MG tablet, Take 1 tablet (5 mg) by mouth 2 times daily for 90 days, Disp: 180 tablet, Rfl: 0    atorvastatin (LIPITOR) 10 MG tablet, Take 1 tablet (10 mg) by mouth every evening, Disp: , Rfl:     camphor-menthol (DERMASARRA) 0.5-0.5 % external lotion, APPLY THIN LAYER TOPICALLY FOUR TIMES EVERY DAY AS NEEDED FOR ITCHING, Disp: , Rfl:     docusate sodium (COLACE) 50 MG capsule, Take 50 mg by mouth 2 times daily, Disp: , Rfl:     melatonin 3 MG tablet, Take 1 tablet (3 mg) by mouth every evening, Disp: , Rfl:     methocarbamol (ROBAXIN) 500 MG tablet, Take 1 tablet (500 mg) by mouth every 8 hours, Disp: 42 tablet, Rfl: 0    metoprolol tartrate (LOPRESSOR) 100 MG tablet, Take 1 tablet (100 mg) by mouth 2 times daily for 360 days Hold for SBP<100, Disp: 180 tablet, Rfl: 3    pantoprazole (PROTONIX) 40 MG EC tablet, Take 1 tablet (40 mg) by mouth every 24 hours for 360 days, Disp: 90 tablet, Rfl: 3    QUEtiapine (SEROQUEL) 25 MG tablet, Take 3 tablets (75 mg) by mouth at bedtime for 360 days (Patient not taking: Reported on 2/29/2024), Disp: 270 tablet, Rfl: 3    QUEtiapine (SEROQUEL) 25 MG tablet, Take 1 tablet (25 mg) by mouth 2 times daily as needed For anxiety (Patient not taking: Reported on 2/29/2024), Disp: 180 tablet, Rfl: 3    UNABLE TO FIND, MEDICATION NAME: [facility] - [facility] and spouse unsure of name, Disp: , Rfl:     Vitamin D3 (CHOLECALCIFEROL)  25 mcg (1000 units) tablet, Take 2 tablets (50 mcg) by mouth every 24 hours for 360 days, Disp: 180 tablet, Rfl: 3    SOCIAL HABITS:    History   Smoking Status    Former    Packs/day: 0.50    Years: 40.00    Types: Cigarettes    Quit date: 2023   Smokeless Tobacco    Not on file     Social History    Substance and Sexual Activity      Alcohol use: Not Currently        Comment: 1-2/wk      History   Drug Use Unknown       FAMILY HISTORY:    Family History   Problem Relation Age of Onset    Unknown/Adopted Mother     Heart Disease Mother     Arthritis Mother     Hypertension Brother     Blood Disease Sister     Psychotic Disorder Sister        PE:  BP (!) 151/89 (BP Location: Right arm, Patient Position: Sitting, Cuff Size: Adult Regular)   Pulse 74   SpO2 98%   Wt Readings from Last 1 Encounters:   02/29/24 59 kg (130 lb)     There is no height or weight on file to calculate BMI.    EXAM:  GENERAL: well-developed 68 year old male who appears his stated age  CARDIAC: normal   CHEST/LUNG: normal respiratory effort   MUSCULOSKELETAL: grossly normal and both lower extremities are intact, no lower extremity edema  NEUROLOGIC: focally intact, alert and oriented x 3  PSYCH: appropriate affect  VASCULAR: LLE incision is healing very well, skin completely closed and mostly healed.  No swelling, no drainage, no signs or symptoms of infection.                    Again, thank you for allowing me to participate in the care of your patient.      Sincerely,    ALTAGRACIA Figueroa CNP

## 2024-03-06 NOTE — PATIENT INSTRUCTIONS
Thank you so much for choosing us for your care. It was a pleasure to see you at the vascular clinic today.     Follow-up recommendations:     Please keep Prevena on at all times.  If Prevena turns off accidentally wore batteries runoff, please Call vascular surgery immediately.    Please call vascular surgery if worsening drainage from AKA incision is noted in Prevena canister.   Please call vascular surgery if new fevers or chills, redness, new pain at incision site are developed.  Follow-up on 3/11/2024 for potential suture removal and discontinuation of Prevena.     If you have any questions, please contact our clinic directly at [phone] and ask for the nurse. We also encourage the use of [employer] to communicate with your HealthCare Provider.        If you have an urgent need after business hours (8:00 am to 4:30 pm) please call [phone], option 4, and ask for the vascular attending on call. For non-urgent after hours needs, please call the vascular nurse at [phone] and leave a detailed voicemail. For scheduling needs, please call our clinic directly at [phone].

## 2024-03-07 ENCOUNTER — TELEPHONE (OUTPATIENT)
Dept: VASCULAR SURGERY | Facility: CLINIC | Age: 69
End: 2024-03-07
Payer: COMMERCIAL

## 2024-03-07 NOTE — TELEPHONE ENCOUNTER
Returned RN call regarding homecare continuation. Noted writer spoke with VS NP and she confirmed that home wound care is no longer needed. Vac and sutures are planned to be removed on Monday. Noted we are unsure if Pt continues to receive home OT/PT. Requested return call to clinic to discuss further if needed. Clinic telephone provided.     Tiffanie Paula LPN

## 2024-03-11 ENCOUNTER — OFFICE VISIT (OUTPATIENT)
Dept: VASCULAR SURGERY | Facility: CLINIC | Age: 69
End: 2024-03-11
Payer: COMMERCIAL

## 2024-03-11 VITALS — SYSTOLIC BLOOD PRESSURE: 161 MMHG | HEART RATE: 100 BPM | DIASTOLIC BLOOD PRESSURE: 103 MMHG | OXYGEN SATURATION: 99 %

## 2024-03-11 DIAGNOSIS — S81.802D OPEN WOUND OF LEFT LOWER EXTREMITY, SUBSEQUENT ENCOUNTER: ICD-10-CM

## 2024-03-11 DIAGNOSIS — Z48.02 ENCOUNTER FOR REMOVAL OF SUTURES: Primary | ICD-10-CM

## 2024-03-11 PROCEDURE — 99024 POSTOP FOLLOW-UP VISIT: CPT | Performed by: NURSE PRACTITIONER

## 2024-03-11 NOTE — LETTER
3/11/2024       RE: Alfredo Burnham  [address]  [place] [address]     Dear Colleague,    Thank you for referring your patient, Alfredo Burnham, to the [facility] VASCULAR CLINIC [place] at [facility]. Please see a copy of my visit note below.        VASCULAR SURGERY PROGRESS NOTE    LOCATION: [facility]     Alfredo Burnham  Medical Record #:  [id number]  YOB: 1955  Age:  68 year old     Date of Service: 3/11/2024    PRIMARY CARE PROVIDER: Clinic, [facility]    Reason for visit: Wound check, Prevena dressing and suture removal    IMPRESSION / RECOMMENDATION:   Alfredo Burnham is a very pleasant 68-year-old gentleman who presents to clinic for LLE stump incision assessment Prevena discontinuation and suture removal. Incision has healed, patient has superficial rash on stump, suspect this will resolve with discontinuation of Prevena and associated tegaderms required for Prevena dressings.  Wound care was completed with light dry dressing applied. All sutures were removed without difficulty. Pending pain clinic for phantom limb this week and follow up with Dr. Lowe.     At home, recommend: Application of iodine daily on LLE stump, allowed to dry, apply dry dressing as needed, for another week, after which can discontinue.     All questions were answered and support provided. He has our contact information and knows to reach out with any additional questions or concerns.       Miryam Carrasco, CNP  Vascular Surgery  Pager: [phone]  [email or web address]  Send message or 10 digit call back number Securely via rVue with the Vocera Web Console (learn more here)      PE:  BP (!) 161/103 (BP Location: Right arm, Patient Position: Sitting, Cuff Size: Adult Regular)   Pulse 100   SpO2 99%   Wt Readings from Last 1 Encounters:   02/29/24 59 kg (130 lb)     There is no height or weight  on file to calculate BMI.    EXAM:  GENERAL: well-developed 68 year old male who appears his stated age  CARDIAC: normal   CHEST/LUNG: normal respiratory effort   MUSCULOSKELETAL: grossly normal and both lower extremities are intact, no lower extremity edema  NEUROLOGIC: focally intact, alert and oriented x 3  PSYCH: appropriate affect  VASCULAR: LLE now completely healed, skin with slight superficial rash.                        Again, thank you for allowing me to participate in the care of your patient.      Sincerely,    ALTAGRACIA Figueroa CNP

## 2024-03-12 ENCOUNTER — OFFICE VISIT (OUTPATIENT)
Dept: ANESTHESIOLOGY | Facility: CLINIC | Age: 69
End: 2024-03-12
Attending: NURSE PRACTITIONER
Payer: COMMERCIAL

## 2024-03-12 VITALS — DIASTOLIC BLOOD PRESSURE: 101 MMHG | OXYGEN SATURATION: 99 % | SYSTOLIC BLOOD PRESSURE: 164 MMHG | HEART RATE: 105 BPM

## 2024-03-12 DIAGNOSIS — Z89.612 S/P AKA (ABOVE KNEE AMPUTATION) UNILATERAL, LEFT (H): ICD-10-CM

## 2024-03-12 PROCEDURE — 99204 OFFICE O/P NEW MOD 45 MIN: CPT

## 2024-03-12 RX ORDER — LANOLIN ALCOHOL/MO/W.PET/CERES
3 CREAM (GRAM) TOPICAL
COMMUNITY

## 2024-03-12 RX ORDER — LOSARTAN POTASSIUM 50 MG/1
50 TABLET ORAL DAILY
COMMUNITY

## 2024-03-12 RX ORDER — DIPHENHYDRAMINE HCL 25 MG
25 TABLET ORAL EVERY 6 HOURS PRN
COMMUNITY

## 2024-03-12 RX ORDER — ACETAMINOPHEN 325 MG/1
325-650 TABLET ORAL EVERY 6 HOURS PRN
COMMUNITY

## 2024-03-12 RX ORDER — GABAPENTIN 100 MG/1
100 CAPSULE ORAL 3 TIMES DAILY
Qty: 90 CAPSULE | Refills: 1 | Status: SHIPPED | OUTPATIENT
Start: 2024-03-12 | End: 2024-03-13

## 2024-03-12 ASSESSMENT — PAIN SCALES - PAIN ENJOYMENT GENERAL ACTIVITY SCALE (PEG)
INTERFERED_ENJOYMENT_LIFE: 2
PEG_TOTALSCORE: 2.33
INTERFERED_GENERAL_ACTIVITY: 0
INTERFERED_ENJOYMENT_LIFE: 2
AVG_PAIN_PASTWEEK: 5
PEG_TOTALSCORE: 2.33
INTERFERED_GENERAL_ACTIVITY: 0 - DOES NOT INTERFERE
AVG_PAIN_PASTWEEK: 5

## 2024-03-12 ASSESSMENT — ANXIETY QUESTIONNAIRES
GAD7 TOTAL SCORE: 1
3. WORRYING TOO MUCH ABOUT DIFFERENT THINGS: NOT AT ALL
IF YOU CHECKED OFF ANY PROBLEMS ON THIS QUESTIONNAIRE, HOW DIFFICULT HAVE THESE PROBLEMS MADE IT FOR YOU TO DO YOUR WORK, TAKE CARE OF THINGS AT HOME, OR GET ALONG WITH OTHER PEOPLE: NOT DIFFICULT AT ALL
1. FEELING NERVOUS, ANXIOUS, OR ON EDGE: NOT AT ALL
7. FEELING AFRAID AS IF SOMETHING AWFUL MIGHT HAPPEN: NOT AT ALL
7. FEELING AFRAID AS IF SOMETHING AWFUL MIGHT HAPPEN: NOT AT ALL
5. BEING SO RESTLESS THAT IT IS HARD TO SIT STILL: NOT AT ALL
2. NOT BEING ABLE TO STOP OR CONTROL WORRYING: NOT AT ALL
4. TROUBLE RELAXING: NOT AT ALL
6. BECOMING EASILY ANNOYED OR IRRITABLE: SEVERAL DAYS
GAD7 TOTAL SCORE: 1
8. IF YOU CHECKED OFF ANY PROBLEMS, HOW DIFFICULT HAVE THESE MADE IT FOR YOU TO DO YOUR WORK, TAKE CARE OF THINGS AT HOME, OR GET ALONG WITH OTHER PEOPLE?: NOT DIFFICULT AT ALL

## 2024-03-12 ASSESSMENT — PAIN SCALES - GENERAL: PAINLEVEL: EXTREME PAIN (9)

## 2024-03-12 NOTE — PROGRESS NOTES
"Alfredo Burnham is a 68 year old male with a past medical history significant for hypertension, peripheral vascular disease who presents with a chief complaint of \"phantom pain\".  The patient had an AAA repair , vascular embolus and subsequent amputation of the left leg.  The amputation is above the knee.  He has been in the ICU and had been on dialysis.  Dialysis ended in December.  In addition to the phantom pain, he endorses stump pain.  He does not if he had a regional technique for the amputation.  He is scheduled to be fit for a  tomorrow    The pain has been present for 3 months .    The pain is Extreme Pain (9) in severity.    The pain is described as sharp.   The pain is alleviated by lying down.    It is exacerbated by Physical therapy.    Modalities that have been utilized in the past which were helpful include nothing.    Things that were not helpful, but tried ,include oxycodone.    The patient has never tried neurontin.      Current Outpatient Medications   Medication    acetaminophen (TYLENOL) 325 MG tablet    amLODIPine (NORVASC) 10 MG tablet    apixaban ANTICOAGULANT (ELIQUIS) 5 MG tablet    atorvastatin (LIPITOR) 10 MG tablet    camphor-menthol (DERMASARRA) 0.5-0.5 % external lotion    diphenhydrAMINE (BENADRYL) 25 MG tablet    docusate sodium (COLACE) 50 MG capsule    empagliflozin (JARDIANCE) 25 MG TABS tablet    losartan (COZAAR) 50 MG tablet    melatonin 3 MG tablet    methocarbamol (ROBAXIN) 500 MG tablet    metoprolol tartrate (LOPRESSOR) 100 MG tablet    pantoprazole (PROTONIX) 40 MG EC tablet    UNABLE TO FIND    Vitamin D3 (CHOLECALCIFEROL) 25 mcg (1000 units) tablet    melatonin 3 MG tablet    QUEtiapine (SEROQUEL) 25 MG tablet    QUEtiapine (SEROQUEL) 25 MG tablet     No current facility-administered medications for this visit.     Allergies   Allergen Reactions    Penicillins Swelling and Anaphylaxis     Facial swelling    Has tolerated cefazolin.    Cefepime Rash     " Diffuse whole body erythematous pruritic rash      Past Medical History:   Diagnosis Date    Hypertension 02/08/2011    Macular degeneration      Past Surgical History:   Procedure Laterality Date    AMPUTATE LEG ABOVE KNEE Left 10/17/2023    Procedure: AMPUTATION, ABOVE KNEE and Abdominal wound vac exchange;  Surgeon: Ash Boucher MBBS;  [facility]    AMPUTATE REVISION STUMP LOWER EXTREMITY Left 12/8/2023    Procedure: REVISION, AMPUTATION STUMP, LEFT LOWER EXTREMITY;  Surgeon: Boris Lowe;  [facility]    AMPUTATE REVISION STUMP LOWER EXTREMITY Left 12/15/2023    Procedure: IRRIGATION AND DEBRIDEMENT OF LEFT AMPUTATION STUMP, WITH CEMENT ANTIBIOTIC BEAD INSERTION X3;  Surgeon: Boris Lowe;  [facility]    AMPUTATE REVISION STUMP LOWER EXTREMITY Left 2/2/2024    Procedure: Left Stump Revision with complex wound closure;  Surgeon: Boris Lowe MD;  [facility]    CLOSE SECONDARY WOUND ABDOMEN N/A 10/20/2023    Procedure: Delayed primary subcutaneous abdominal closure;  Surgeon: Boris Lowe;  [facility]    INCISION AND DRAINAGE ABDOMEN WASHOUT, COMBINED N/A 09/25/2023    Procedure: abdominal washout, combined, repacking,  abthera placement;  Surgeon: Ash Boucher MBBS;  [facility]    INCISION AND DRAINAGE ABDOMEN WASHOUT, COMBINED N/A 09/26/2023    Procedure: Abdominal washout, Retroperitoneal closure, washout left lower extremity wound;  Surgeon: Boris Lowe;  [facility]    INCISION AND DRAINAGE LOWER EXTREMITY, COMBINED Left 12/1/2023    Procedure: irrigation and drainage of collection left above knee amputation stump;  Surgeon: Ash Boucher MBBS;  [facility]    IR CVC NON TUNNEL LINE REMOVAL  10/18/2023    IR CVC TUNNEL PLACEMENT > 5 YRS OF AGE  10/18/2023    IR CVC TUNNEL REMOVAL RIGHT  11/02/2023    IR OR ANGIOGRAM  09/25/2023    IRRIGATION AND DEBRIDEMENT ABDOMEN WASHOUT, COMBINED N/A 10/02/2023    Procedure:  Exploratory laparotomy, abominal closure, wound vac change left lower extremity;  Surgeon: Jonathan Fry MD;  [facility]    IRRIGATION AND DEBRIDEMENT ABDOMEN WASHOUT, COMBINED N/A 9/29/2023    Procedure: exploration of  ABDOMINAL CAVITY, placement of abthera;  Surgeon: Boris Lowe MD;  [facility]    IRRIGATION AND DEBRIDEMENT LOWER EXTREMITY, COMBINED Left 9/29/2023    Procedure: exploration of left lower extremity, partial closure of left lower extremity, wound vac placement;  Surgeon: Boris Lowe MD;  [facility]    LAPAROTOMY EXPLORATORY N/A 09/25/2023    Procedure: Laparotomy exploratory;  Surgeon: Roger Shirley MD;  [facility]    PICC INSERTION - DOUBLE LUMEN Right 12/04/2023    43-1cm, Basilic vein    REPAIR ANEURYSM ABDOMINAL AORTA N/A 09/24/2023    Procedure: Resection of Ruptureed Abdominal Aneurysm, Aortic biliary bypass with 20 x 10 mm Bifurcated hemagard graft, Temporary Abdominal Closure, Endovascular Balloon Inclusion of Aorta;  Surgeon: Boris Lowe;  [facility]    SIGMOIDOSCOPY FLEXIBLE N/A 09/25/2023    Procedure: Sigmoidoscopy flexible;  Surgeon: Gabino Walker MD;  [facility]    THROMBECTOMY LOWER EXTREMITY Left 09/25/2023    Procedure: SFA, popliteal, and tibial thromboembolectomy and four compartment fasciotomy;  Surgeon: Ash Boucher MBBS;  [facility]     Family History   Problem Relation Age of Onset    Unknown/Adopted Mother     Heart Disease Mother     Arthritis Mother     Hypertension Brother     Blood Disease Sister     Psychotic Disorder Sister      Social History     Socioeconomic History    Marital status:      Spouse name: None    Number of children: None    Years of education: None    Highest education level: None   Tobacco Use    Smoking status: Former     Packs/day: 0.50     Years: 40.00     Additional pack years: 0.00     Total pack years: 20.00     Types: Cigarettes     Quit date: 2023      Years since quittin.1    Tobacco comments:     Last cigarette/ETOH 2023   Substance and Sexual Activity    Alcohol use: Not Currently     Comment: 1-2/wk    Drug use: Never    Sexual activity: Yes     Partners: Female   Other Topics Concern    Parent/sibling w/ CABG, MI or angioplasty before 65F 55M? Yes      ROS: 10 point ROS neg other than the symptoms noted above in the HPI.  Physical Exam  Vitals and nursing note reviewed.   Musculoskeletal:      Left lower leg: No swelling, deformity, lacerations, tenderness or bony tenderness. No edema.      Comments: s/p amputation of the left leg         A/P:The patient is a 69 y/o man with peripheral vascular disease who presents with a chief complaint of phantom limb pain.  The patient has the amputation about three months ago.  He does not have a  or prosthesis yet.  These may help his pain.  In the meantime, I recommend gabapentin 100 mg tid.  His creatinine is 1.6.  We also recommend tylenol prn (max 3 g/d)  Answers submitted by the patient for this visit:  SCOUT-7 (Submitted on 3/12/2024)  SCOUT 7 TOTAL SCORE: 1

## 2024-03-12 NOTE — PATIENT INSTRUCTIONS
Thank you so much for choosing us for your care. It was a pleasure to see you at the vascular clinic today.     Follow-up recommendations: You have an appointment with Dr. Lowe on 3/13/24.       If you have any questions, please contact our clinic directly at [phone] and ask for the nurse. We also encourage the use of [employer] to communicate with your HealthCare Provider.        If you have an urgent need after business hours (8:00 am to 4:30 pm) please call [phone], option 4, and ask for the vascular attending on call. For non-urgent after hours needs, please call the vascular nurse at [phone] and leave a detailed voicemail. For scheduling needs, please call our clinic directly at [phone].

## 2024-03-12 NOTE — PROGRESS NOTES
VASCULAR SURGERY PROGRESS NOTE    LOCATION: [facility]     Alfredo Burnham  Medical Record #:  [id number]  YOB: 1955  Age:  68 year old     Date of Service: 3/11/2024    PRIMARY CARE PROVIDER: Clinic, [facility]    Reason for visit: Wound check, Prevena dressing and suture removal    IMPRESSION / RECOMMENDATION:   Alfredo Burnham is a very pleasant 68-year-old gentleman who presents to clinic for LLE stump incision assessment Prevena discontinuation and suture removal. Incision has healed, patient has superficial rash on stump, suspect this will resolve with discontinuation of Prevena and associated tegaderms required for Prevena dressings.  Wound care was completed with light dry dressing applied. All sutures were removed without difficulty. Pending pain clinic for phantom limb this week and follow up with Dr. Lowe.     At home, recommend: Application of iodine daily on LLE stump, allowed to dry, apply dry dressing as needed, for another week, after which can discontinue.     All questions were answered and support provided. He has our contact information and knows to reach out with any additional questions or concerns.       Miryam Carrasco, CNP  Vascular Surgery  Pager: [phone]  [email or web address]  Send message or 10 digit call back number Securely via [employer] with the [employer] Web Console (learn more here)      PE:  BP (!) 161/103 (BP Location: Right arm, Patient Position: Sitting, Cuff Size: Adult Regular)   Pulse 100   SpO2 99%   Wt Readings from Last 1 Encounters:   02/29/24 59 kg (130 lb)     There is no height or weight on file to calculate BMI.    EXAM:  GENERAL: well-developed 68 year old male who appears his stated age  CARDIAC: normal   CHEST/LUNG: normal respiratory effort   MUSCULOSKELETAL: grossly normal and both lower extremities are intact, no lower extremity edema  NEUROLOGIC: focally intact, alert and oriented x 3  PSYCH: appropriate  affect  VASCULAR: LLE now completely healed, skin with slight superficial rash.

## 2024-03-12 NOTE — PATIENT INSTRUCTIONS
Medications:    gabapentin (NEURONTIN) 100 MG capsule.  Take 1 capsule (100 mg) by mouth 3 times daily       *Please provide the clinic with a minium of 1 week notice, on all prescription refills.       Recommended Follow up:      Follow up in 4-8 weeks.        Please call [phone] to schedule your clinic appointment if you don't already have an appointment scheduled.        To speak with a nurse, schedule/reschedule/cancel a clinic appointment, or request a medication refill call: [phone]    You can also reach us by [email or web address]: [email or web address]

## 2024-03-12 NOTE — NURSING NOTE
RN reviewed AVS with patient. Patient to contact clinic if any questions/concerns. Patient verbalized understanding.    Brandy Cruz RN

## 2024-03-12 NOTE — NURSING NOTE
Patient presents with:  Consult: Consult Left Leg Pain -  Amputations       Extreme Pain (9)         What medications are you using for pain? Tylenol    (New patients only) Have you been seen by another pain clinic/ provider? no    (Return Patients only) What refills are you needing today? No    Expectations Not sure

## 2024-03-12 NOTE — PROGRESS NOTES
Vascular & Endovascular Surgery Clinic Return Visit   Vascular Surgeon: Boris Lowe MD, RPVI       Location:  [facility]    Alfredo Burnham  Medical Record #:  [id number]  YOB: 1955  Age:  68 year old     Date of Service: 3/13/2024    Primary Care Provider: Clinic, [facility]    Chief Complaint/Reason for Visit: 1 month follow up after delayed primary closure of amputation stump    HPI:  Mr. Burnham is a pleasant 68 year old gentleman seen today in follow up with his wife, Tabby, for 1 month follow up after amputation stump revision.  He has a complex surgical history after initially presenting with ruptured abdominal aortic aneurysm with prolonged hospitalization and multiple issues post operatively ultimately culminating in infection of his amputation stump after fecal contamination at [facility].  He fortunately is now doing quite well all things considered. He is becoming more independent again. He has some phantom pain for which he is seeing the pain clinic.  he denies chest pain, palpitations, dizziness, shortness of breath, fever, chills, abdominal pain, nausea, vomiting, unintended weight gain or weight loss, headaches, or focal neurologic deficits.    CV risk factors include hypertension, history of TIA, ruptured pararenal AAA status post open repair c/b left lower extremity ischemia requiring subsequent thrombectomy and ultimately AKA, MAYA and subsequent renal failure requiring temporary dialysis, low grade ischemic colitis managed medically, vocal cord paralysis s/p injection, CVA, and PE.  He is a former smoker    Relevant surgical history:  - Resection of ruptured 7 cm pararenal abdominal aortic aneurysm and bilateral iliac aneurysms with replacement using a 20x10 bifurcated Dacron graft, Endovascular balloon occlusion of the aorta via percutaneous right femoral access and temporary abdominal closure (Dr. Fry and myself -  9/24/2023)  - Exploratory laparotomy and repeat retroperitoneal packing, temporary abdominal closure, below knee popliteal exposure with femoropopliteal and tibial thrombectomies, four compartment fasciotomies (Dr. Boucher - 9/25/2023)  - Exploratory laparotomy with retroperitoneal closure, temporary abdominal closure, irrigation and debridement of lower extremity wounds (9/26/2023)  - Exploratory laparotomy with abdominal washout, temporary abdominal closure, irrigation and debridement left lower extremity wounds with partial medial fasciotomy closure (9/29/2023)  - Exploratory laparotomy with fascial closure, replacement of lower extremity negative pressure wound vacs (Dr. Fry - 10/2/2023)  - Left above knee amputation, abdominal wound vac exchange (Dr. Boucher - 10/17/2023)  - Delayed primary closure of abdominal wound over drain (10/20/2023)  - Incision and drainage with excisional debridement of infected left above knee amputation stump (Dr. Boucher - 12/1/2023)  - Irrigation and debridement left AKA stump with additional resection femur (12/8/2023)  -Irrigation and debridement left AKA stump with placement of antibiotic beads (12/15/2023)  - Removal of 1 out of 3 antibiotic beads with delayed primary closure of left amputation stump (2/2/2024)    Review of Systems:    A 12 point ROS was reviewed and is negative except for symptoms noted in HPI.     Medical/Surgical History:    Past Medical History:   Diagnosis Date    Hypertension 02/08/2011    Macular degeneration          Past Surgical History:   Procedure Laterality Date    AMPUTATE LEG ABOVE KNEE Left 10/17/2023    Procedure: AMPUTATION, ABOVE KNEE and Abdominal wound vac exchange;  Surgeon: Ash Boucher MBBS;  Location: [facility]    AMPUTATE REVISION STUMP LOWER EXTREMITY Left 12/8/2023    Procedure: REVISION, AMPUTATION STUMP, LEFT LOWER EXTREMITY;  Surgeon: Boris Lowe;  [facility]    AMPUTATE REVISION STUMP LOWER EXTREMITY Left  12/15/2023    Procedure: IRRIGATION AND DEBRIDEMENT OF LEFT AMPUTATION STUMP, WITH CEMENT ANTIBIOTIC BEAD INSERTION X3;  Surgeon: Boris Lowe;  [facility]    AMPUTATE REVISION STUMP LOWER EXTREMITY Left 2/2/2024    Procedure: Left Stump Revision with complex wound closure;  Surgeon: Boris Lowe MD;  [facility]    CLOSE SECONDARY WOUND ABDOMEN N/A 10/20/2023    Procedure: Delayed primary subcutaneous abdominal closure;  Surgeon: Boris Lowe;  [facility]    INCISION AND DRAINAGE ABDOMEN WASHOUT, COMBINED N/A 09/25/2023    Procedure: abdominal washout, combined, repacking,  abthera placement;  Surgeon: Ash Boucher MBBS;  [facility]    INCISION AND DRAINAGE ABDOMEN WASHOUT, COMBINED N/A 09/26/2023    Procedure: Abdominal washout, Retroperitoneal closure, washout left lower extremity wound;  Surgeon: Boris Lowe;  [facility]    INCISION AND DRAINAGE LOWER EXTREMITY, COMBINED Left 12/1/2023    Procedure: irrigation and drainage of collection left above knee amputation stump;  Surgeon: Ash Boucher MBBS;  [facility]    IR CVC NON TUNNEL LINE REMOVAL  10/18/2023    IR CVC TUNNEL PLACEMENT > 5 YRS OF AGE  10/18/2023    IR CVC TUNNEL REMOVAL RIGHT  11/02/2023    IR OR ANGIOGRAM  09/25/2023    IRRIGATION AND DEBRIDEMENT ABDOMEN WASHOUT, COMBINED N/A 10/02/2023    Procedure: Exploratory laparotomy, abominal closure, wound vac change left lower extremity;  Surgeon: Jonathan Fry MD;  [facility]    IRRIGATION AND DEBRIDEMENT ABDOMEN WASHOUT, COMBINED N/A 9/29/2023    Procedure: exploration of  ABDOMINAL CAVITY, placement of abthera;  Surgeon: Boris Lowe MD;  [facility]    IRRIGATION AND DEBRIDEMENT LOWER EXTREMITY, COMBINED Left 9/29/2023    Procedure: exploration of left lower extremity, partial closure of left lower extremity, wound vac placement;  Surgeon: Boris Lowe MD;  [facility]    LAPAROTOMY EXPLORATORY  N/A 09/25/2023    Procedure: Laparotomy exploratory;  Surgeon: Roger Shirley MD;  [facility]    PICC INSERTION - DOUBLE LUMEN Right 12/04/2023    43-1cm, Basilic vein    REPAIR ANEURYSM ABDOMINAL AORTA N/A 09/24/2023    Procedure: Resection of Ruptureed Abdominal Aneurysm, Aortic biliary bypass with 20 x 10 mm Bifurcated hemagard graft, Temporary Abdominal Closure, Endovascular Balloon Inclusion of Aorta;  Surgeon: Boris Lowe;  [facility]    SIGMOIDOSCOPY FLEXIBLE N/A 09/25/2023    Procedure: Sigmoidoscopy flexible;  Surgeon: Gabino Walker MD;  [facility]    THROMBECTOMY LOWER EXTREMITY Left 09/25/2023    Procedure: SFA, popliteal, and tibial thromboembolectomy and four compartment fasciotomy;  Surgeon: Ash Boucher MBBS;  [facility]        Allergies:     Allergies   Allergen Reactions    Penicillins Swelling and Anaphylaxis     Facial swelling    Has tolerated cefazolin.    Cefepime Rash     Diffuse whole body erythematous pruritic rash        Medications:    Current Outpatient Medications   Medication    acetaminophen (TYLENOL) 325 MG tablet    amLODIPine (NORVASC) 10 MG tablet    apixaban ANTICOAGULANT (ELIQUIS) 5 MG tablet    atorvastatin (LIPITOR) 10 MG tablet    camphor-menthol (DERMASARRA) 0.5-0.5 % external lotion    diphenhydrAMINE (BENADRYL) 25 MG tablet    docusate sodium (COLACE) 50 MG capsule    empagliflozin (JARDIANCE) 25 MG TABS tablet    gabapentin (NEURONTIN) 100 MG capsule    losartan (COZAAR) 50 MG tablet    melatonin 3 MG tablet    melatonin 3 MG tablet    methocarbamol (ROBAXIN) 500 MG tablet    metoprolol tartrate (LOPRESSOR) 100 MG tablet    pantoprazole (PROTONIX) 40 MG EC tablet    QUEtiapine (SEROQUEL) 25 MG tablet    QUEtiapine (SEROQUEL) 25 MG tablet    UNABLE TO FIND    Vitamin D3 (CHOLECALCIFEROL) 25 mcg (1000 units) tablet     No current facility-administered medications for this visit.        Social Habits:    Tobacco Use      Smoking  status: Former        Packs/day: 0.50        Years: 40.00        Additional pack years: 0.00        Total pack years: 20.00        Types: Cigarettes        Quit date:         Years since quittin.1      Smokeless tobacco: None      Tobacco comments: Last cigarette/ETOH 2023       Alcohol Use: Not on file       History   Drug Use Unknown        Family History:    Family History   Problem Relation Age of Onset    Unknown/Adopted Mother     Heart Disease Mother     Arthritis Mother     Hypertension Brother     Blood Disease Sister     Psychotic Disorder Sister        Physical Examination:  BP (!) 159/106 (BP Location: Right arm, Patient Position: Sitting, Cuff Size: Adult Regular)   Pulse 112   SpO2 99%   Wt Readings from Last 1 Encounters:   24 130 lb     There is no height or weight on file to calculate BMI.    Exam:  General: No apparent distress. Answers questions appropriately   Neurologic: Focally intact, Alert and oriented x 3.   Eyes: Stable asymmetric pupils from preoperative  Cardiac:  RRR  Pulmonary:  Non labored breathing with normal respiratory effort  Abdominal: Soft, non distended, non tender to palpation.  No pulsatile or expansile mass. Well healing incision  Musculoskeletal/Extremity: Well healing left amputation site    Laboratory Findings:  Hemoglobin   Date Value Ref Range Status   2024 9.5 (L) 13.3 - 17.7 g/dL Final   2024 10.0 (L) 13.3 - 17.7 g/dL Final   2009 15.1 13.3 - 17.7 g/dL Final     WBC   Date Value Ref Range Status   2009 7.0 4.0 - 11.0 10e9/L Final     WBC Count   Date Value Ref Range Status   2024 6.9 4.0 - 11.0 10e3/uL Final   2024 6.8 4.0 - 11.0 10e3/uL Final     Platelet Count   Date Value Ref Range Status   2024 96 (L) 150 - 450 10e3/uL Final   2024 100 (L) 150 - 450 10e3/uL Final   2009 157 150 - 450 10e9/L Final     Creatinine   Date Value Ref Range Status   2024 1.75 (H) 0.67 - 1.17 mg/dL Final    02/08/2011 1.12 0.66 - 1.25 mg/dL Final     Comment:     New IDMS-traceable calibration  beginning 5/1/08     Sodium   Date Value Ref Range Status   02/05/2024 138 135 - 145 mmol/L Final     Comment:     Reference intervals for this test were updated on 09/26/2023 to more accurately reflect our healthy population. There may be differences in the flagging of prior results with similar values performed with this method. Interpretation of those prior results can be made in the context of the updated reference intervals.    02/08/2011 138 133 - 144 mmol/L Final     Glucose   Date Value Ref Range Status   02/05/2024 101 (H) 70 - 99 mg/dL Final   02/04/2024 94 70 - 99 mg/dL Final   02/08/2011 102 (H) 60 - 99 mg/dL Final   02/24/2009 92 60 - 99 mg/dL Final     GLUCOSE BY METER POCT   Date Value Ref Range Status   02/02/2024 84 70 - 99 mg/dL Final   12/15/2023 87 70 - 99 mg/dL Final     Hemoglobin A1C   Date Value Ref Range Status   09/25/2023 5.7 (H) <5.7 % Final     Comment:     Normal <5.7%   Prediabetes 5.7-6.4%    Diabetes 6.5% or higher     Note: Adopted from ADA consensus guidelines.     INR   Date Value Ref Range Status   12/06/2023 1.27 (H) 0.85 - 1.15 Final   10/15/2009 0.9  Final       Imaging:    No new imaging today    Impression/Shared Medical Decision Making:    #1- Ruptured 7 cm pararenal abdominal aortic and bilateral iliac aneurysms status post open repair, 9/24/2023  #2- Left lower extremity transfemoral amputation from left lower extremity ischemia secondary to #1  #3- Left transfemoral amputation site infection status post multiple debridements and subsequent delayed primary closure, healing well  #4- Pulmonary embolism secondary to #1, on anticoagulation  #5- Chronic kidney disease status post acute renal failure requiring temporary dialysis secondary to #1, stable renal function with Cr 1.7-1.9 off HD  #6- Hypertension  #7- History of CVA and prior TIA  #8- Ischemic colitis secondary to #1,  resolved  #9- Former nicotine dependence, quit  #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 Burnham is a pleasant 68 year old gentleman seen today with his wife Tabby, both of whom are very well-known to me after prolonged hospitalization after ruptured pararenal aortic and iliac aneurysms and subsequent above-knee amputation.  Unfortunately he had a stump infection while in rehab and required multiple irrigation debridements as well as delayed primary closure after planned time at home for the holidays with antibiotic beads.  He is evaluated for 1 month follow-up to assess the stump.  This is healing well.  No signs of wound breakdown.  Is not quite ready for prosthetic fitting at this time but I do suspect he is on the right track.  We did have a long discussion about the difficulties in [facility].  [facility] been [facility] able to [facility] as [facility].  I have offered to discuss having him see my partner, Dr. Jonathan Fry for continued follow-up.  They are quite familiar with him as he assisted me in the complex initial operation and was a part of his postoperative care.  They would like this very much.    Discussed warning signs including, but not limited to, signs of infection, fever, chills, purulence, erythema, induration, or stump breakdown.  If he experiences any of these, he has been advised to seek treatment and contact my team.    Mr. Burnham and his wife, Tabby are in agreement with this plan as outlined.    Recommendations/Plan:  Will work to assist Mr. Burnham establish care with [facility], [facility] given their difficulties with coordinating care at multiple hospital systems  1 month follow up to assess stump for readiness for prosthetic and next aortic evaluation.    Boris Lowe MD  Vascular & Endovascular Surgery      20 minutes spent on the day of encounter doing chart review from our system and care everywhere, history  and exam, documentation, coordinating care, and further activities as noted with over half spent counseling.

## 2024-03-12 NOTE — LETTER
"3/12/2024       RE: Alfredo Burnham  [address]  [place] [facility] [address]     Dear Colleague,    Thank you for referring your patient, Alfredo uBrnham, to the [facility] [place] at [facility]. Please see a copy of my visit note below.    Alfredo Burnham is a 68 year old male with a past medical history significant for hypertension, peripheral vascular disease who presents with a chief complaint of \"phantom pain\".  The patient had an AAA repair , vascular embolus and subsequent amputation of the left leg.  The amputation is above the knee.  He has been in the ICU and had been on dialysis.  Dialysis ended in December.  In addition to the phantom pain, he endorses stump pain.  He does not if he had a regional technique for the amputation.  He is scheduled to be fit for a  tomorrow    The pain has been present for 3 months .    The pain is Extreme Pain (9) in severity.    The pain is described as sharp.   The pain is alleviated by lying down.    It is exacerbated by Physical therapy.    Modalities that have been utilized in the past which were helpful include nothing.    Things that were not helpful, but tried ,include oxycodone.    The patient has never tried neurontin.      Current Outpatient Medications   Medication    acetaminophen (TYLENOL) 325 MG tablet    amLODIPine (NORVASC) 10 MG tablet    apixaban ANTICOAGULANT (ELIQUIS) 5 MG tablet    atorvastatin (LIPITOR) 10 MG tablet    camphor-menthol (DERMASARRA) 0.5-0.5 % external lotion    diphenhydrAMINE (BENADRYL) 25 MG tablet    docusate sodium (COLACE) 50 MG capsule    empagliflozin (JARDIANCE) 25 MG TABS tablet    losartan (COZAAR) 50 MG tablet    melatonin 3 MG tablet    methocarbamol (ROBAXIN) 500 MG tablet    metoprolol tartrate (LOPRESSOR) 100 MG tablet    pantoprazole (PROTONIX) 40 MG EC tablet    UNABLE TO FIND    Vitamin D3 " (CHOLECALCIFEROL) 25 mcg (1000 units) tablet    melatonin 3 MG tablet    QUEtiapine (SEROQUEL) 25 MG tablet    QUEtiapine (SEROQUEL) 25 MG tablet     No current facility-administered medications for this visit.     Allergies   Allergen Reactions    Penicillins Swelling and Anaphylaxis     Facial swelling    Has tolerated cefazolin.    Cefepime Rash     Diffuse whole body erythematous pruritic rash      Past Medical History:   Diagnosis Date    Hypertension 02/08/2011    Macular degeneration      Past Surgical History:   Procedure Laterality Date    AMPUTATE LEG ABOVE KNEE Left 10/17/2023    Procedure: AMPUTATION, ABOVE KNEE and Abdominal wound vac exchange;  Surgeon: Ash Boucher MBBS;  [facility]    AMPUTATE REVISION STUMP LOWER EXTREMITY Left 12/8/2023    Procedure: REVISION, AMPUTATION STUMP, LEFT LOWER EXTREMITY;  Surgeon: Boris Lowe;  [facility]    AMPUTATE REVISION STUMP LOWER EXTREMITY Left 12/15/2023    Procedure: IRRIGATION AND DEBRIDEMENT OF LEFT AMPUTATION STUMP, WITH CEMENT ANTIBIOTIC BEAD INSERTION X3;  Surgeon: Boris Lowe;  [facility]    AMPUTATE REVISION STUMP LOWER EXTREMITY Left 2/2/2024    Procedure: Left Stump Revision with complex wound closure;  Surgeon: Boris Lowe MD;  [facility]    CLOSE SECONDARY WOUND ABDOMEN N/A 10/20/2023    Procedure: Delayed primary subcutaneous abdominal closure;  Surgeon: Boris Lowe;  [facility]    INCISION AND DRAINAGE ABDOMEN WASHOUT, COMBINED N/A 09/25/2023    Procedure: abdominal washout, combined, repacking,  abthera placement;  Surgeon: Ash Boucher MBBS;  [facility]    INCISION AND DRAINAGE ABDOMEN WASHOUT, COMBINED N/A 09/26/2023    Procedure: Abdominal washout, Retroperitoneal closure, washout left lower extremity wound;  Surgeon: Boris Lowe;  [facility]    INCISION AND DRAINAGE LOWER EXTREMITY, COMBINED Left 12/1/2023    Procedure: irrigation and drainage of  collection left above knee amputation stump;  Surgeon: Ash Boucher MBBS;  [facility]    IR CVC NON TUNNEL LINE REMOVAL  10/18/2023    IR CVC TUNNEL PLACEMENT > 5 YRS OF AGE  10/18/2023    IR CVC TUNNEL REMOVAL RIGHT  11/02/2023    IR OR ANGIOGRAM  09/25/2023    IRRIGATION AND DEBRIDEMENT ABDOMEN WASHOUT, COMBINED N/A 10/02/2023    Procedure: Exploratory laparotomy, abominal closure, wound vac change left lower extremity;  Surgeon: Jonathan Fry MD;  [facility]    IRRIGATION AND DEBRIDEMENT ABDOMEN WASHOUT, COMBINED N/A 9/29/2023    Procedure: exploration of  ABDOMINAL CAVITY, placement of abthera;  Surgeon: Boris Lowe MD;  [facility]    IRRIGATION AND DEBRIDEMENT LOWER EXTREMITY, COMBINED Left 9/29/2023    Procedure: exploration of left lower extremity, partial closure of left lower extremity, wound vac placement;  Surgeon: Boris Lowe MD;  [facility]    LAPAROTOMY EXPLORATORY N/A 09/25/2023    Procedure: Laparotomy exploratory;  Surgeon: Roger Shirley MD;  [facility]    PICC INSERTION - DOUBLE LUMEN Right 12/04/2023    43-1cm, Basilic vein    REPAIR ANEURYSM ABDOMINAL AORTA N/A 09/24/2023    Procedure: Resection of Ruptureed Abdominal Aneurysm, Aortic biliary bypass with 20 x 10 mm Bifurcated hemagard graft, Temporary Abdominal Closure, Endovascular Balloon Inclusion of Aorta;  Surgeon: Boris Lowe;  [facility]    SIGMOIDOSCOPY FLEXIBLE N/A 09/25/2023    Procedure: Sigmoidoscopy flexible;  Surgeon: Gabino Walker MD;  [facility]    THROMBECTOMY LOWER EXTREMITY Left 09/25/2023    Procedure: SFA, popliteal, and tibial thromboembolectomy and four compartment fasciotomy;  Surgeon: Ash Boucher MBBS;  [facility]     Family History   Problem Relation Age of Onset    Unknown/Adopted Mother     Heart Disease Mother     Arthritis Mother     Hypertension Brother     Blood Disease Sister     Psychotic Disorder Sister      Social  History     Socioeconomic History    Marital status:      Spouse name: None    Number of children: None    Years of education: None    Highest education level: None   Tobacco Use    Smoking status: Former     Packs/day: 0.50     Years: 40.00     Additional pack years: 0.00     Total pack years: 20.00     Types: Cigarettes     Quit date:      Years since quittin.1    Tobacco comments:     Last cigarette/ETOH 2023   Substance and Sexual Activity    Alcohol use: Not Currently     Comment: 1-2/wk    Drug use: Never    Sexual activity: Yes     Partners: Female   Other Topics Concern    Parent/sibling w/ CABG, MI or angioplasty before 65F 55M? Yes      ROS: 10 point ROS neg other than the symptoms noted above in the HPI.  Physical Exam  Vitals and nursing note reviewed.   Musculoskeletal:      Left lower leg: No swelling, deformity, lacerations, tenderness or bony tenderness. No edema.      Comments: s/p amputation of the left leg         A/P:The patient is a 67 y/o man with peripheral vascular disease who presents with a chief complaint of phantom limb pain.  The patient has the amputation about three months ago.  He does not have a  or prosthesis yet.  These may help his pain.  In the meantime, I recommend gabapentin 100 mg tid.  His creatinine is 1.6.  We also recommend tylenol prn (max 3 g/d)  Answers submitted by the patient for this visit:  SCOUT-7 (Submitted on 3/12/2024)  SCOUT 7 TOTAL SCORE: 1        Again, thank you for allowing me to participate in the care of your patient.      Sincerely,    Sawyer Burnham MD

## 2024-03-13 ENCOUNTER — OFFICE VISIT (OUTPATIENT)
Dept: VASCULAR SURGERY | Facility: CLINIC | Age: 69
End: 2024-03-13
Payer: COMMERCIAL

## 2024-03-13 VITALS — HEART RATE: 112 BPM | SYSTOLIC BLOOD PRESSURE: 159 MMHG | DIASTOLIC BLOOD PRESSURE: 106 MMHG | OXYGEN SATURATION: 99 %

## 2024-03-13 DIAGNOSIS — Z89.612 S/P AKA (ABOVE KNEE AMPUTATION) UNILATERAL, LEFT (H): ICD-10-CM

## 2024-03-13 DIAGNOSIS — N18.32 CHRONIC KIDNEY DISEASE, STAGE 3B (H): ICD-10-CM

## 2024-03-13 DIAGNOSIS — I10 PRIMARY HYPERTENSION: ICD-10-CM

## 2024-03-13 DIAGNOSIS — I71.31: Primary | ICD-10-CM

## 2024-03-13 PROCEDURE — 99024 POSTOP FOLLOW-UP VISIT: CPT | Performed by: SURGERY

## 2024-03-13 RX ORDER — GABAPENTIN 100 MG/1
100 CAPSULE ORAL 3 TIMES DAILY
Qty: 90 CAPSULE | Refills: 1 | Status: CANCELLED | OUTPATIENT
Start: 2024-03-13

## 2024-03-13 RX ORDER — GABAPENTIN 100 MG/1
100 CAPSULE ORAL 3 TIMES DAILY
Qty: 90 CAPSULE | Refills: 0 | Status: SHIPPED | OUTPATIENT
Start: 2024-03-13

## 2024-03-13 ASSESSMENT — PAIN SCALES - GENERAL: PAINLEVEL: NO PAIN (1)

## 2024-03-13 NOTE — CONFIDENTIAL NOTE
Patient and his spouse showed up at the [facility] requesting the Gabapentin prescription that [facility] be [facility] to a different pharmacy. [facility] reports that the [facility] for at least another 10-14 days, as it's from an external provider. Patient and spouse report that they are heading out of town tomorrow and wondering if this prescription could be sent to the [address] [place] [address] pharmacy. Writer informed we will discuss this request with a covering provider. Patient and spouse appreciated.     Brandy Cruz RN

## 2024-03-13 NOTE — LETTER
3/13/2024       RE: Alfredo Burnham  [address]  [place] [address]       Dear Colleague,    Thank you for referring your patient, Alfredo Burnham, to the [facility] at [facility]. Please see a copy of my visit note below.    Vascular & Endovascular Surgery Clinic Return Visit   Vascular Surgeon: Boris Lowe MD, RPVI       Location:  [facility]    Alfredo Burnham  Medical Record #:  [id number]  YOB: 1955  Age:  68 year old     Date of Service: 3/13/2024    Primary Care Provider: Clinic, [facility]    Chief Complaint/Reason for Visit: 1 month follow up after delayed primary closure of amputation stump    HPI:  Mr. Burnham is a pleasant 68 year old gentleman seen today in follow up with his wife, Tabby, for 1 month follow up after amputation stump revision.  He has a complex surgical history after initially presenting with ruptured abdominal aortic aneurysm with prolonged hospitalization and multiple issues post operatively ultimately culminating in infection of his amputation stump after fecal contamination at [facility].  He fortunately is now doing quite well all things considered. He is becoming more independent again. He has some phantom pain for which he is seeing the pain clinic.  he denies chest pain, palpitations, dizziness, shortness of breath, fever, chills, abdominal pain, nausea, vomiting, unintended weight gain or weight loss, headaches, or focal neurologic deficits.    CV risk factors include hypertension, history of TIA, ruptured pararenal AAA status post open repair c/b left lower extremity ischemia requiring subsequent thrombectomy and ultimately AKA, MAYA and subsequent renal failure requiring temporary dialysis, low grade ischemic colitis managed medically, vocal cord paralysis s/p injection, CVA, and PE.  He is a former  [facility]  Pediatric Hematology/Oncology  Progress Note    Patient Name: Jefry Koo  Admission Date: 7/24/2023  Hospital Length of Stay: 1 days  Code Status: Prior     Subjective:     Interval History: MARTÍNEZEON, pain improving    Oncology Treatment Plan:     PEDS BMT FLU/TBI + PT CY    Medications:  Continuous Infusions:  Scheduled Meds:   calcium-vitamin D3  2 tablet Oral Nightly    gabapentin  300 mg Oral Daily    gabapentin  600 mg Oral QHS    pediatric multivitamin  1 tablet Oral QHS     PRN Meds:ondansetron, oxyCODONE, sodium chloride 0.9%       Objective:     Vital Signs (Most Recent):  Temp: 98.8 °F (37.1 °C) (07/25/23 0700)  Pulse: 96 (07/25/23 0700)  Resp: 18 (07/25/23 0700)  BP: 104/62 (07/25/23 0700)  SpO2: 96 % (07/25/23 0700) Vital Signs (24h Range):  Temp:  [97.5 °F (36.4 °C)-98.8 °F (37.1 °C)] 98.8 °F (37.1 °C)  Pulse:  [83-96] 96  Resp:  [17-20] 18  SpO2:  [94 %-99 %] 96 %  BP: (101-109)/(56-67) 104/62     Weight: 30.1 kg (66 lb 5.7 oz)  Body mass index is 14.88 kg/m².  Body surface area is 1.09 meters squared.      Intake/Output Summary (Last 24 hours) at 7/25/2023 1113  Last data filed at 7/25/2023 0758  Gross per 24 hour   Intake 420 ml   Output 700 ml   Net -280 ml          Physical Exam  Vitals and nursing note reviewed. Exam conducted with a chaperone present.   Constitutional:       General: He is active.      Appearance: Normal appearance.   HENT:      Head: Normocephalic.      Right Ear: External ear normal.      Left Ear: External ear normal.      Nose: Nose normal.      Mouth/Throat:      Mouth: Mucous membranes are moist.      Pharynx: Oropharynx is clear.   Eyes:      Conjunctiva/sclera: Conjunctivae normal.      Pupils: Pupils are equal, round, and reactive to light.   Cardiovascular:      Rate and Rhythm: Normal rate and regular rhythm.      Heart sounds: Normal heart sounds.   Pulmonary:      Effort: Pulmonary effort is normal. No retractions.      Breath  smoker    Relevant surgical history:  - Resection of ruptured 7 cm pararenal abdominal aortic aneurysm and bilateral iliac aneurysms with replacement using a 20x10 bifurcated Dacron graft, Endovascular balloon occlusion of the aorta via percutaneous right femoral access and temporary abdominal closure (Dr. Fry and myself - 9/24/2023)  - Exploratory laparotomy and repeat retroperitoneal packing, temporary abdominal closure, below knee popliteal exposure with femoropopliteal and tibial thrombectomies, four compartment fasciotomies (Dr. Boucher - 9/25/2023)  - Exploratory laparotomy with retroperitoneal closure, temporary abdominal closure, irrigation and debridement of lower extremity wounds (9/26/2023)  - Exploratory laparotomy with abdominal washout, temporary abdominal closure, irrigation and debridement left lower extremity wounds with partial medial fasciotomy closure (9/29/2023)  - Exploratory laparotomy with fascial closure, replacement of lower extremity negative pressure wound vacs (Dr. Fry - 10/2/2023)  - Left above knee amputation, abdominal wound vac exchange (Dr. Boucher - 10/17/2023)  - Delayed primary closure of abdominal wound over drain (10/20/2023)  - Incision and drainage with excisional debridement of infected left above knee amputation stump (Dr. Boucher - 12/1/2023)  - Irrigation and debridement left AKA stump with additional resection femur (12/8/2023)  -Irrigation and debridement left AKA stump with placement of antibiotic beads (12/15/2023)  - Removal of 1 out of 3 antibiotic beads with delayed primary closure of left amputation stump (2/2/2024)    Review of Systems:    A 12 point ROS was reviewed and is negative except for symptoms noted in HPI.     Medical/Surgical History:    Past Medical History:   Diagnosis Date    Hypertension 02/08/2011    Macular degeneration          Past Surgical History:   Procedure Laterality Date    AMPUTATE LEG ABOVE KNEE Left 10/17/2023    Procedure:  sounds: Normal breath sounds. No wheezing.   Abdominal:      General: Abdomen is flat. Bowel sounds are normal.      Palpations: Abdomen is soft.      Tenderness: There is no abdominal tenderness. There is no guarding.   Skin:     General: Skin is warm.      Findings: Rash (on back of right shoulder from surgery) present.   Neurological:      General: No focal deficit present.      Mental Status: He is alert.            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 Admitted for TBI and BMT for further treatment for his myeloid sarcoma.     #AML and Stem Cell Transplant and myeloid sarcoma  - TBI twice today   - At risk for jaw pain 2/2 to TBI; tylenol and oxy prn  - BID weights starting after transplant; qd weights for now  - Strict I/O  - Vitals q4h    #immunocompromised state  - vaccinations on hold  - will start posaconazole, levaquin and acyclovir and pentamidine on day -1   - Check CMV/EBV titers once weekly after transplant    #FEN/GI  - Regular Diet              Charlotte Reveles,   Pediatric Hematology/Oncology  [facility]         AMPUTATION, ABOVE KNEE and Abdominal wound vac exchange;  Surgeon: Ash Boucher MBBS;  Location: [facility]    AMPUTATE REVISION STUMP LOWER EXTREMITY Left 12/8/2023    Procedure: REVISION, AMPUTATION STUMP, LEFT LOWER EXTREMITY;  Surgeon: Boris Lowe;  Location: [facility]    AMPUTATE REVISION STUMP LOWER EXTREMITY Left 12/15/2023    Procedure: IRRIGATION AND DEBRIDEMENT OF LEFT AMPUTATION STUMP, WITH CEMENT ANTIBIOTIC BEAD INSERTION X3;  Surgeon: Boris Lowe;  Location: [facility]    AMPUTATE REVISION STUMP LOWER EXTREMITY Left 2/2/2024    Procedure: Left Stump Revision with complex wound closure;  Surgeon: Boris Lowe MD;  Location: [facility]    CLOSE SECONDARY WOUND ABDOMEN N/A 10/20/2023    Procedure: Delayed primary subcutaneous abdominal closure;  Surgeon: Boris Lowe;  Location: [facility]    INCISION AND DRAINAGE ABDOMEN WASHOUT, COMBINED N/A 09/25/2023    Procedure: abdominal washout, combined, repacking,  abthera placement;  Surgeon: Ash Boucher MBBS;  Location: [facility]    INCISION AND DRAINAGE ABDOMEN WASHOUT, COMBINED N/A 09/26/2023    Procedure: Abdominal washout, Retroperitoneal closure, washout left lower extremity wound;  Surgeon: Boris Lowe;  Location: [facility]    INCISION AND DRAINAGE LOWER EXTREMITY, COMBINED Left 12/1/2023    Procedure: irrigation and drainage of collection left above knee amputation stump;  Surgeon: Ash Boucher MBBS;  Location: [facility]    IR CVC NON TUNNEL LINE REMOVAL  10/18/2023    IR CVC TUNNEL PLACEMENT > 5 YRS OF AGE  10/18/2023    IR CVC TUNNEL REMOVAL RIGHT  11/02/2023    IR OR ANGIOGRAM  09/25/2023    IRRIGATION AND DEBRIDEMENT ABDOMEN WASHOUT, COMBINED N/A 10/02/2023    Procedure: Exploratory laparotomy, abominal closure, wound vac change left lower extremity;  Surgeon: Jonathan Fry MD;  Location: [facility]    IRRIGATION AND DEBRIDEMENT ABDOMEN WASHOUT, COMBINED N/A 9/29/2023    Procedure: exploration of  ABDOMINAL CAVITY, placement of  abthera;  Surgeon: Boris Lowe MD;  Location: [facility]    IRRIGATION AND DEBRIDEMENT LOWER EXTREMITY, COMBINED Left 9/29/2023    Procedure: exploration of left lower extremity, partial closure of left lower extremity, wound vac placement;  Surgeon: Boris Lowe MD;  [facility]    LAPAROTOMY EXPLORATORY N/A 09/25/2023    Procedure: Laparotomy exploratory;  Surgeon: Roger Shirley MD;  Location: [facility]    PICC INSERTION - DOUBLE LUMEN Right 12/04/2023    43-1cm, Basilic vein    REPAIR ANEURYSM ABDOMINAL AORTA N/A 09/24/2023    Procedure: Resection of Ruptureed Abdominal Aneurysm, Aortic biliary bypass with 20 x 10 mm Bifurcated hemagard graft, Temporary Abdominal Closure, Endovascular Balloon Inclusion of Aorta;  Surgeon: Boris Lowe;  Location: [facility]    SIGMOIDOSCOPY FLEXIBLE N/A 09/25/2023    Procedure: Sigmoidoscopy flexible;  Surgeon: Gabino Walker MD;  Location: [facility]    THROMBECTOMY LOWER EXTREMITY Left 09/25/2023    Procedure: SFA, popliteal, and tibial thromboembolectomy and four compartment fasciotomy;  Surgeon: Ash Boucher MBBS;  Location: [facility]        Allergies:     Allergies   Allergen Reactions    Penicillins Swelling and Anaphylaxis     Facial swelling    Has tolerated cefazolin.    Cefepime Rash     Diffuse whole body erythematous pruritic rash        Medications:    Current Outpatient Medications   Medication    acetaminophen (TYLENOL) 325 MG tablet    amLODIPine (NORVASC) 10 MG tablet    apixaban ANTICOAGULANT (ELIQUIS) 5 MG tablet    atorvastatin (LIPITOR) 10 MG tablet    camphor-menthol (DERMASARRA) 0.5-0.5 % external lotion    diphenhydrAMINE (BENADRYL) 25 MG tablet    docusate sodium (COLACE) 50 MG capsule    empagliflozin (JARDIANCE) 25 MG TABS tablet    gabapentin (NEURONTIN) 100 MG capsule    losartan (COZAAR) 50 MG tablet    melatonin 3 MG tablet    melatonin 3 MG tablet    methocarbamol (ROBAXIN) 500 MG tablet    metoprolol tartrate (LOPRESSOR)  100 MG tablet    pantoprazole (PROTONIX) 40 MG EC tablet    QUEtiapine (SEROQUEL) 25 MG tablet    QUEtiapine (SEROQUEL) 25 MG tablet    UNABLE TO FIND    Vitamin D3 (CHOLECALCIFEROL) 25 mcg (1000 units) tablet     No current facility-administered medications for this visit.        Social Habits:    Tobacco Use      Smoking status: Former        Packs/day: 0.50        Years: 40.00        Additional pack years: 0.00        Total pack years: 20.00        Types: Cigarettes        Quit date:         Years since quittin.1      Smokeless tobacco: None      Tobacco comments: Last cigarette/ETOH 2023       Alcohol Use: Not on file       History   Drug Use Unknown        Family History:    Family History   Problem Relation Age of Onset    Unknown/Adopted Mother     Heart Disease Mother     Arthritis Mother     Hypertension Brother     Blood Disease Sister     Psychotic Disorder Sister        Physical Examination:  BP (!) 159/106 (BP Location: Right arm, Patient Position: Sitting, Cuff Size: Adult Regular)   Pulse 112   SpO2 99%   Wt Readings from Last 1 Encounters:   24 130 lb     There is no height or weight on file to calculate BMI.    Exam:  General: No apparent distress. Answers questions appropriately   Neurologic: Focally intact, Alert and oriented x 3.   Eyes: Stable asymmetric pupils from preoperative  Cardiac:  RRR  Pulmonary:  Non labored breathing with normal respiratory effort  Abdominal: Soft, non distended, non tender to palpation.  No pulsatile or expansile mass. Well healing incision  Musculoskeletal/Extremity: Well healing left amputation site    Laboratory Findings:  Hemoglobin   Date Value Ref Range Status   2024 9.5 (L) 13.3 - 17.7 g/dL Final   2024 10.0 (L) 13.3 - 17.7 g/dL Final   2009 15.1 13.3 - 17.7 g/dL Final     WBC   Date Value Ref Range Status   2009 7.0 4.0 - 11.0 10e9/L Final     WBC Count   Date Value Ref Range Status   2024 6.9 4.0 - 11.0  10e3/uL Final   02/04/2024 6.8 4.0 - 11.0 10e3/uL Final     Platelet Count   Date Value Ref Range Status   02/05/2024 96 (L) 150 - 450 10e3/uL Final   02/04/2024 100 (L) 150 - 450 10e3/uL Final   02/18/2009 157 150 - 450 10e9/L Final     Creatinine   Date Value Ref Range Status   02/05/2024 1.75 (H) 0.67 - 1.17 mg/dL Final   02/08/2011 1.12 0.66 - 1.25 mg/dL Final     Comment:     New IDMS-traceable calibration  beginning 5/1/08     Sodium   Date Value Ref Range Status   02/05/2024 138 135 - 145 mmol/L Final     Comment:     Reference intervals for this test were updated on 09/26/2023 to more accurately reflect our healthy population. There may be differences in the flagging of prior results with similar values performed with this method. Interpretation of those prior results can be made in the context of the updated reference intervals.    02/08/2011 138 133 - 144 mmol/L Final     Glucose   Date Value Ref Range Status   02/05/2024 101 (H) 70 - 99 mg/dL Final   02/04/2024 94 70 - 99 mg/dL Final   02/08/2011 102 (H) 60 - 99 mg/dL Final   02/24/2009 92 60 - 99 mg/dL Final     GLUCOSE BY METER POCT   Date Value Ref Range Status   02/02/2024 84 70 - 99 mg/dL Final   12/15/2023 87 70 - 99 mg/dL Final     Hemoglobin A1C   Date Value Ref Range Status   09/25/2023 5.7 (H) <5.7 % Final     Comment:     Normal <5.7%   Prediabetes 5.7-6.4%    Diabetes 6.5% or higher     Note: Adopted from ADA consensus guidelines.     INR   Date Value Ref Range Status   12/06/2023 1.27 (H) 0.85 - 1.15 Final   10/15/2009 0.9  Final       Imaging:    No new imaging today    Impression/Shared Medical Decision Making:    #1- Ruptured 7 cm pararenal abdominal aortic and bilateral iliac aneurysms status post open repair, 9/24/2023  #2- Left lower extremity transfemoral amputation from left lower extremity ischemia secondary to #1  #3- Left transfemoral amputation site infection status post multiple debridements and subsequent delayed primary  closure, healing well  #4- Pulmonary embolism secondary to #1, on anticoagulation  #5- Chronic kidney disease status post acute renal failure requiring temporary dialysis secondary to #1, stable renal function with Cr 1.7-1.9 off HD  #6- Hypertension  #7- History of CVA and prior TIA  #8- Ischemic colitis secondary to #1, resolved  #9- Former nicotine dependence, quit  #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 Burnham is a pleasant 68 year old gentleman seen today with his wife Tabby, both of whom are very well-known to me after prolonged hospitalization after ruptured pararenal aortic and iliac aneurysms and subsequent above-knee amputation.  Unfortunately he had a stump infection while in rehab and required multiple irrigation debridements as well as delayed primary closure after planned time at home for the holidays with antibiotic beads.  He is evaluated for 1 month follow-up to assess the stump.  This is healing well.  No signs of wound breakdown.  Is not quite ready for prosthetic fitting at this time but I do suspect he is on the right track.  We did have a long discussion about the difficulties [facility].  [facility] been [facility] able to [facility] as [facility].  I have offered to discuss having him see my partner, Dr. Jonathan Fry for continued follow-up.  They are quite familiar with him as he assisted me in the complex initial operation and was a part of his postoperative care.  They would like this very much.    Discussed warning signs including, but not limited to, signs of infection, fever, chills, purulence, erythema, induration, or stump breakdown.  If he experiences any of these, he has been advised to seek treatment and contact my team.    Mr. Burnham and his wife, Tabby are in agreement with this plan as outlined.    Recommendations/Plan:  Will work to assist Mr. Burnham establish care with [facility], [facility] given their  difficulties with coordinating care at multiple hospital systems  1 month follow up to assess stump for readiness for prosthetic and next aortic evaluation.        20 minutes spent on the day of encounter doing chart review from our system and care everywhere, history and exam, documentation, coordinating care, and further activities as noted with over half spent counseling.           Again, thank you for allowing me to participate in the care of your patient.      Sincerely,    Boris Lowe MD

## 2024-03-13 NOTE — PATIENT INSTRUCTIONS
Thank you so much for choosing us for your care. It was a pleasure to see you at the vascular clinic today.     Follow-up recommendations: We will review your follow-up plan with our team.    Additional testing/imaging ordered today: None      Our scheduling team will get in touch with you to set up any follow-up testing/imaging and/or appointments. Please be aware that any testing/imaging recommended today will need to completed prior to your next visit with the provider. If testing/imaging is not completed prior to your next visit, your visit may be rescheduled.     If you have any questions, please contact our clinic directly at [phone] and ask for the nurse. We also encourage the use of [employer] to communicate with your healthcare provider.    If you have an urgent need after business hours (8:00 am to 4:30 pm) please call [phone], option 4, and ask for the vascular attending on call. For non-urgent after hours needs, please call the vascular clinic at [phone]. For scheduling needs, please call our clinic directly at [phone].    =====================================================================    [facility] is recognized by the [facility] as a comprehensive stroke center. As part of our commitment to better patient outcomes and excellent stroke education, we attach the below stroke education materials to ALL of the after visit summaries in our vascular clinic.        Learning About BE FAST: Stroke Warning Signs  BE FAST is a simple way to remember the main symptoms of stroke. These symptoms happen suddenly. So learning what to look for helps you know when to call for medical help. BE FAST stands for:    B - Balance.  Loss of balance or trouble walking.     E - Eyes.  Trouble seeing out of one or both eyes.     F - Face.  Weakness or drooping on one side of the face.     A - Arm.  Weakness or numbness in an arm or leg.     S - Speech.  Trouble speaking.      T - Time to call 911.  Also call 911 if you have other stroke symptoms. They include:  Sudden confusion.  Sudden trouble understanding simple statements.  Fainting.  A seizure.  A sudden, severe headache.     A stroke happens when a blood vessel in the brain bursts or is blocked by a blood clot. The blood supply to part of the brain--and the oxygen the blood carries--is reduced. This damages the brain.   If you have a stroke, quick treatment may save your life. And it may reduce the damage in your brain so that you have fewer problems after the stroke.   Current as of: December 18, 2022               Content Version: 13.8    8880-8924 [employer].   Care instructions adapted under license by your healthcare professional. If you have questions about a medical condition or this instruction, always ask your healthcare professional. [employer] disclaims any warranty or liability for your use of this information.    Learning About How to Prevent a Stroke  What is a stroke?  A stroke is damage to the brain that occurs when a blood vessel in the brain bursts or is blocked by a blood clot. Without blood and the oxygen it carries, part of the brain starts to die. The part of the body controlled by the damaged area of the brain can't work properly.  Brain damage can start within minutes of a stroke. But quick treatment can help limit the damage and increase the chance of a full recovery.  What puts you at risk for stroke?  A risk factor is anything that makes you more likely to have a particular health problem.  Risk factors for stroke that you can manage or change include:  Health problems like atrial fibrillation, diabetes, high blood pressure, high cholesterol, hardening of the arteries (atherosclerosis), and sickle cell disease.  Smoking.  Drinking more than 2 alcoholic drinks a day for men and 1 drink a day for women.  Being overweight.  Not eating healthy foods.  Not getting enough physical  activity.  Risk factors you can't change include:  Having a previous stroke.  Family history of stroke.  Being older.  Being , [place] Native, , or South  American.  Being female.  Having certain problems during pregnancy, such as preeclampsia.  Being past menopause.  Your doctor can help you know your risk. Then you and your doctor can talk about whether to take steps to lower it.  How can you help prevent a stroke?  Here are some things you can do to help prevent a stroke.  Manage health problems that raise your risk. These include atrial fibrillation, diabetes, high blood pressure, and high cholesterol.  Have a heart-healthy lifestyle.  Don't smoke. If you need help quitting, talk to your doctor about stop-smoking programs and medicines. These can increase your chances of quitting for good.  Limit alcohol to 2 drinks a day for men and 1 drink a day for women.  Stay at a healthy weight. Lose weight if you need to.  Be active. Get at least 30 minutes of exercise on most days of the week. Walking is a good choice. You also may want to do other activities, such as running, swimming, cycling, or playing tennis or team sports.  Eat heart-healthy foods. These include vegetables, fruits, nuts, beans, lean meat, fish, and whole grains. Limit sodium and sugar.  If you think you may have a problem with alcohol or drug use, talk to your doctor.  If you use hormone therapy for menopause or hormonal birth control, talk with your doctor. Ask if these are right for you. They may raise the risk of stroke in some people.  Decide with your doctor whether you will also take medicines to help lower your risk. For example, you and your doctor may decide you will take a medicine that prevents blood clots.  What are the symptoms of a stroke?  Symptoms of a stroke happen quickly. A stroke may cause:  Sudden numbness, tingling, weakness, or loss of movement in your face, arm, or leg, especially on only  one side of your body.  Sudden vision changes.  Sudden trouble speaking.  Sudden confusion or trouble understanding simple statements.  Sudden problems with walking or balance.  A sudden, severe headache that is different from past headaches.  Fainting.  A seizure.  It's important to call for medical help if you have stroke symptoms. Quick treatment may save your life. And it may reduce the damage in your brain so that you have fewer problems after the stroke.  Follow-up care is a key part of your treatment and safety. Be sure to make and go to all appointments, and call your doctor if you are having problems. It's also a good idea to know your test results and keep a list of the medicines you take.    Current as of: December 18, 2022               Content Version: 13.8    3366-8657 [employer].   Care instructions adapted under license by your healthcare professional. If you have questions about a medical condition or this instruction, always ask your healthcare professional. [employer] disclaims any warranty or liability for your use of this information.

## 2024-03-13 NOTE — CONFIDENTIAL NOTE
RN called patient to update that the prescription has been sent [place] the [facility] our covering provider Dr. Valente. Patient verbalized understanding and was appreciative.     Brandy Curz RN

## 2024-03-14 ENCOUNTER — PATIENT OUTREACH (OUTPATIENT)
Dept: CARE COORDINATION | Facility: CLINIC | Age: 69
End: 2024-03-14
Payer: COMMERCIAL

## 2024-03-14 ENCOUNTER — TELEPHONE (OUTPATIENT)
Dept: VASCULAR SURGERY | Facility: CLINIC | Age: 69
End: 2024-03-14

## 2024-03-14 NOTE — TELEPHONE ENCOUNTER
Vascular follow-up plan discussed w/ Dr Fry and Dr. Lowe. [facility]. I contacted pt's PCP office and asked that they send an internal referral to Dr Fry. Updated pt on the plan - I was unable to reach him but I did leave him a detailed message along with our callback number.    RADHA Samuels, RN  RN Care Coordinator  [facility] - [facility] phone: [phone]  Fax: [phone]

## 2024-03-14 NOTE — PROGRESS NOTES
Social Work - Follow-Up  [facility]    Data/Intervention:    Patient Name: Alfredo Burnham Goes By: Alfredo    /Age: 1955 (68 year old)    Reason for Follow-Up:  Wound vac bill questions    Collaborated With:    -Alfredo- [phone]    Intervention/Education/Resources Provided:  I contacted Alfredo and was able to speak with him today and explained the reason for my outreach. I explained that I connected with [address] [place] [address] and the team there is doing more looking into things and will update Alfredo when they know more. Alfredo said he is hoping things were just a misunderstanding. I explained the option of seeking financial help with a bill by calling [employer] and offered this phone number but Alfredo declined and said they are able to pay the bill.     I offered time and space for Alfredo to identify if there are any further SW needs or questions at this time and Alfredo said there are not.     Assessment/Plan:  No follow up is planned as all needs have currently been addressed.     Previously provided patient/family with writer's contact information and availability.      BONY Biswas, [facility]    [facility]  Ph: [phone], Pgr: [phone]  3/14/2024

## 2024-03-25 ENCOUNTER — PATIENT OUTREACH (OUTPATIENT)
Dept: CARE COORDINATION | Facility: CLINIC | Age: 69
End: 2024-03-25
Payer: COMMERCIAL

## 2024-03-25 NOTE — PROGRESS NOTES
Social Work - Follow-Up  [facility]    Data/Intervention:    Patient Name: Alfredo Burnham Goes By: Alfredo    /Age: 1955 (68 year old)    Reason for Follow-Up:  Billing questions    Collaborated With:    -Alfredo and his wife Tabby with Alfredo' verbal permission- [phone]    Intervention/Education/Resources Provided:  I received a vm from Tabby reporting she got another bill from [employer] for $800 and is curious if I have any more info from [employer] that could help her.     I returned a call and spoke with Tabby, after getting Alfredo' verbal permission.   Tabby reported she spoke with someone with [employer] recently who explained seeing in the documentation that there may be a duplicate chart but it is going to be looked info further. Tabby also said she will be getting an itemized bill to [facility] to see if they will help pay at all. I advised that Tabby update [employer] if she does submit a bill to the [facility] so they know she is taking action of some sort. Tabby reported being informed of the 20% charge to them as a part of DME billing with Medicare.     I explained being given a number by [employer] that Tabby/Alfredo could call if a bill is not able to be paid or a financial hardship, to explore options and Tabby declined this number at this time.     Tabby denied any further needs or questions at this time.     Assessment/Plan:  No further follow up is planned at this time.     Previously provided patient/family with writer's contact information and availability.      BONY iBswas, [facility]    [facility]  Ph: [phone]  3/25/2024

## 2024-04-23 ENCOUNTER — OFFICE VISIT (OUTPATIENT)
Dept: ANESTHESIOLOGY | Facility: CLINIC | Age: 69
End: 2024-04-23
Payer: COMMERCIAL

## 2024-04-23 VITALS — DIASTOLIC BLOOD PRESSURE: 80 MMHG | OXYGEN SATURATION: 99 % | SYSTOLIC BLOOD PRESSURE: 138 MMHG | HEART RATE: 99 BPM

## 2024-04-23 DIAGNOSIS — Z89.612 S/P AKA (ABOVE KNEE AMPUTATION) UNILATERAL, LEFT (H): Primary | ICD-10-CM

## 2024-04-23 PROCEDURE — 99215 OFFICE O/P EST HI 40 MIN: CPT

## 2024-04-23 RX ORDER — GABAPENTIN 100 MG/1
100 CAPSULE ORAL 3 TIMES DAILY
Qty: 90 CAPSULE | Refills: 4 | Status: SHIPPED | OUTPATIENT
Start: 2024-04-23

## 2024-04-23 ASSESSMENT — PAIN SCALES - GENERAL: PAINLEVEL: MODERATE PAIN (4)

## 2024-04-23 NOTE — PROGRESS NOTES
[facility] CLINIC FOR COMPREHENSIVE PAIN MANAGEMENT [address]  [address] [facility] [address]  Phone: [phone]  Fax: [phone]    Patient:  Alfredo Burnham, Date of birth 1955  Date of Visit:  04/23/2024  Referring Provider Referred Self  Reason for visit: Follow up phantom pain       Assessment & Plan  1. Phantom limb pain.  The patient will maintain his current regimen of gabapentin 100 mg tid, administered thrice daily, and Tylenol as required.    Follow-up  The patient is scheduled for a follow-up visit in 3 to 4 months.           40 minutes spent by me on the date of the encounter doing chart review, patient visit, and documentation       Subjective   Alfredo is a 68 year old male who presents for Follow Up (Follow-up Left Leg Pain-Phantom)  History of Present Illness  The patient presents for evaluation of phantom limb pain. He is accompanied by an adult female.    The patient was last evaluated on 03/12/2024. 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 Since then, he has been experiencing phantom limb pain. During his last visit, he spent an extended period in the ICU and was undergoing dialysis. His creatinine levels have been monitored, and he is currently not undergoing dialysis. During his previous visit, he was advised to take Tylenol and gabapentin 100 mg, administered thrice daily. He reports a slight increase in pain immediately before and after taking gabapentin, which he rates as a 4 out of 10. However, after an hour of taking gabapentin, the pain subsides to a 0 to 2 out of 10. The pain does not disrupt his sleep, and he does not notice the pain during nocturnal bathroom visits. He is scheduled for a prosthesis fitting tomorrow and has been using a  for the past few weeks. He has expressed disinterest in  increasing his gabapentin dosage. He is legally blind and has poor central vision. His current medication regimen includes Jardiance and atorvastatin in addition to the gabapentin    Pertinent history obtain from: patient    Objective     Vital signs:  /80 (BP Location: Left arm, Patient Position: Chair, Cuff Size: Adult Regular)   Pulse 99   SpO2 99%   Blood pressure 138/80, pulse 99, SpO2 99%.  Physical Exam  Unchanged    Results  Laboratory Studies  Creatinine was 1.6.  Laboratory data and imaging listed below was reviewed prior to this encounter.             Consent was obtained from the patient to use an AI documentation tool in the creation of  this note

## 2024-04-23 NOTE — PATIENT INSTRUCTIONS
Medications:    gabapentin (NEURONTIN) 100 MG capsule.  Take 1 capsule (100 mg) by mouth 3 times daily.         Recommended Follow up:      Follow up in 3-4 months.        Please call [phone] to schedule your clinic appointment if you don't already have an appointment scheduled.        To speak with a nurse, schedule/reschedule/cancel a clinic appointment, or request a medication refill call: [phone]    You can also reach us by [employer]: [email or web address]

## 2024-04-23 NOTE — LETTER
4/23/2024       RE: Alfredo Burnham  [address]  [place] [facility] [address]       Dear Colleague,    Thank you for referring your patient, Alfredo Burnham, to the [facility] FOR COMPREHENSIVE PAIN MANAGEMENT [place] [facility]. Please see a copy of my visit note below.      [facility] FOR COMPREHENSIVE PAIN MANAGEMENT [address]  5TH FLOOR  [facility] [address]  Phone: [phone]  Fax: [phone]    Patient:  Alfredo Burnham, Date of birth 1955  Date of Visit:  04/23/2024  Referring Provider Referred Self  Reason for visit: Follow up phantom pain      Assessment & Plan  1. Phantom limb pain.  The patient will maintain his current regimen of gabapentin 100 mg tid, administered thrice daily, and Tylenol as required.    Follow-up  The patient is scheduled for a follow-up visit in 3 to 4 months.           40 minutes spent by me on the date of the encounter doing chart review, patient visit, and documentation       Subjective  Alfredo is a 68 year old male who presents for Follow Up (Follow-up Left Leg Pain-Phantom)  History of Present Illness  The patient presents for evaluation of phantom limb pain. He is accompanied by an adult female.    The patient was last evaluated on 03/12/2024. 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 Since then, he has been experiencing phantom limb pain. During his last visit, he spent an extended period in the ICU and was undergoing dialysis. His creatinine levels have been monitored, and he is currently not undergoing dialysis. During his previous visit, he was advised to take Tylenol and gabapentin 100 mg, administered thrice daily. He reports a slight increase in pain immediately before and after taking gabapentin,  which he rates as a 4 out of 10. However, after an hour of taking gabapentin, the pain subsides to a 0 to 2 out of 10. The pain does not disrupt his sleep, and he does not notice the pain during nocturnal bathroom visits. He is scheduled for a prosthesis fitting tomorrow and has been using a  for the past few weeks. He has expressed disinterest in increasing his gabapentin dosage. He is legally blind and has poor central vision. His current medication regimen includes Jardiance and atorvastatin in addition to the gabapentin    Pertinent history obtain from: patient    Objective    Vital signs:  /80 (BP Location: Left arm, Patient Position: Chair, Cuff Size: Adult Regular)   Pulse 99   SpO2 99%   Blood pressure 138/80, pulse 99, SpO2 99%.  Physical Exam  Unchanged    Results  Laboratory Studies  Creatinine was 1.6.  Laboratory data and imaging listed below was reviewed prior to this encounter.             Consent was obtained from the patient to use an AI documentation tool in the creation of  this note        Again, thank you for allowing me to participate in the care of your patient.      Sincerely,    Sawyer Burnham MD

## 2024-04-23 NOTE — NURSING NOTE
Patient presents with:  Follow Up: Follow-up Left Leg Pain-Phantom      Moderate Pain (4)     Pain Medications       Analgesics Other Refills Start End     acetaminophen (TYLENOL) 325 MG tablet --  --    Sig - Route: Take 325-650 mg by mouth every 6 hours as needed for mild pain - Oral    Class: Historical            What medications are you using for pain? Tylenol, Gabapentin    (New patients only) Have you been seen by another pain clinic/ provider? no    (Return Patients only) What refills are you needing today? no

## 2024-04-25 ENCOUNTER — TELEPHONE (OUTPATIENT)
Dept: ANESTHESIOLOGY | Facility: CLINIC | Age: 69
End: 2024-04-25
Payer: COMMERCIAL

## 2024-04-25 NOTE — TELEPHONE ENCOUNTER
Patient confirmed scheduled appointment:     Date: 7/30   Time: 1:15 PM  Visit type: Return pain  Provider: Dr. Burnham  Location: [facility]

## 2024-10-19 NOTE — PLAN OF CARE
Discharge Planner Post-Acute Rehab OT:     Discharge Plan: home with assist as needed and HH OT    Precautions: fall, sternal, abdominal, NWB of LLE, wound vac on LLE, moderate thick liquids    Current Status:  ADLs:  Mobility: Wc based, slide board Ax1, *wife cleared to assist bed<->w/c via slideboard, Nursing Assist 1-2 Via Slideboard  Grooming: set up seated  Dressing: UB: set up, LB min A in supine  Bathing: Ax2 using slideboard to rolling blue and white shower chair, min A for bathing seated  Toileting: slide board bed to commode or Wc to commode over the toilet with Ax1 in therapy, Ax2 with nursing  IADLs: Wife can assist  Vision/Cognition: baseline visual deficits as pt is legally blind, need to further assess cognition.    Assessment: tx focus dressing and mobility. Extra time required for LB dsg, pt required assist to problem solve wound vac line which was wrapped around pt and out top of shorts, pt attempting to thread through pants at the same time. Assisted pt to untangle and start over, pt able to thread vac line through pants w/cues, and cues for technique w/donning (easier for pt to don on LLE residual limb first then RLE) in supine w/rolling technique and bed control adjustments for ease of positioning. Issued reacher and instructed pt to use if items need to retrieve from lower/floor levels.     Other Barriers to Discharge (DME, Family Training, etc): Family training, DME                            no no
